# Patient Record
Sex: MALE | Race: WHITE | NOT HISPANIC OR LATINO | Employment: OTHER | URBAN - METROPOLITAN AREA
[De-identification: names, ages, dates, MRNs, and addresses within clinical notes are randomized per-mention and may not be internally consistent; named-entity substitution may affect disease eponyms.]

---

## 2018-01-16 ENCOUNTER — HISTORICAL (OUTPATIENT)
Dept: LAB | Facility: HOSPITAL | Age: 53
End: 2018-01-16

## 2018-01-22 ENCOUNTER — HISTORICAL (OUTPATIENT)
Dept: LAB | Facility: HOSPITAL | Age: 53
End: 2018-01-22

## 2018-03-05 ENCOUNTER — HISTORICAL (OUTPATIENT)
Dept: LAB | Facility: HOSPITAL | Age: 53
End: 2018-03-05

## 2018-05-09 ENCOUNTER — HISTORICAL (OUTPATIENT)
Dept: WOUND CARE | Facility: HOSPITAL | Age: 53
End: 2018-05-09

## 2018-05-16 ENCOUNTER — HISTORICAL (OUTPATIENT)
Dept: WOUND CARE | Facility: HOSPITAL | Age: 53
End: 2018-05-16

## 2018-05-23 ENCOUNTER — HISTORICAL (OUTPATIENT)
Dept: WOUND CARE | Facility: HOSPITAL | Age: 53
End: 2018-05-23

## 2018-05-30 ENCOUNTER — HISTORICAL (OUTPATIENT)
Dept: WOUND CARE | Facility: HOSPITAL | Age: 53
End: 2018-05-30

## 2018-06-06 ENCOUNTER — HISTORICAL (OUTPATIENT)
Dept: WOUND CARE | Facility: HOSPITAL | Age: 53
End: 2018-06-06

## 2018-06-13 ENCOUNTER — HISTORICAL (OUTPATIENT)
Dept: WOUND CARE | Facility: HOSPITAL | Age: 53
End: 2018-06-13

## 2018-06-17 LAB — FINAL CULTURE: NORMAL

## 2018-06-20 ENCOUNTER — HISTORICAL (OUTPATIENT)
Dept: WOUND CARE | Facility: HOSPITAL | Age: 53
End: 2018-06-20

## 2018-06-26 ENCOUNTER — HISTORICAL (OUTPATIENT)
Dept: WOUND CARE | Facility: HOSPITAL | Age: 53
End: 2018-06-26

## 2018-07-11 ENCOUNTER — HISTORICAL (OUTPATIENT)
Dept: WOUND CARE | Facility: HOSPITAL | Age: 53
End: 2018-07-11

## 2018-07-18 ENCOUNTER — HISTORICAL (OUTPATIENT)
Dept: WOUND CARE | Facility: HOSPITAL | Age: 53
End: 2018-07-18

## 2018-07-25 ENCOUNTER — HISTORICAL (OUTPATIENT)
Dept: WOUND CARE | Facility: HOSPITAL | Age: 53
End: 2018-07-25

## 2018-08-08 ENCOUNTER — HISTORICAL (OUTPATIENT)
Dept: WOUND CARE | Facility: HOSPITAL | Age: 53
End: 2018-08-08

## 2018-08-15 ENCOUNTER — HISTORICAL (OUTPATIENT)
Dept: WOUND CARE | Facility: HOSPITAL | Age: 53
End: 2018-08-15

## 2018-08-22 ENCOUNTER — HISTORICAL (OUTPATIENT)
Dept: WOUND CARE | Facility: HOSPITAL | Age: 53
End: 2018-08-22

## 2018-08-29 ENCOUNTER — HISTORICAL (OUTPATIENT)
Dept: WOUND CARE | Facility: HOSPITAL | Age: 53
End: 2018-08-29

## 2018-09-05 ENCOUNTER — HISTORICAL (OUTPATIENT)
Dept: WOUND CARE | Facility: HOSPITAL | Age: 53
End: 2018-09-05

## 2018-09-12 ENCOUNTER — HISTORICAL (OUTPATIENT)
Dept: WOUND CARE | Facility: HOSPITAL | Age: 53
End: 2018-09-12

## 2018-09-19 ENCOUNTER — HISTORICAL (OUTPATIENT)
Dept: WOUND CARE | Facility: HOSPITAL | Age: 53
End: 2018-09-19

## 2018-10-03 ENCOUNTER — HISTORICAL (OUTPATIENT)
Dept: WOUND CARE | Facility: HOSPITAL | Age: 53
End: 2018-10-03

## 2018-10-07 ENCOUNTER — HISTORICAL (OUTPATIENT)
Dept: WOUND CARE | Facility: HOSPITAL | Age: 53
End: 2018-10-07

## 2018-10-17 ENCOUNTER — HISTORICAL (OUTPATIENT)
Dept: WOUND CARE | Facility: HOSPITAL | Age: 53
End: 2018-10-17

## 2018-10-24 ENCOUNTER — HISTORICAL (OUTPATIENT)
Dept: WOUND CARE | Facility: HOSPITAL | Age: 53
End: 2018-10-24

## 2018-10-27 ENCOUNTER — HISTORICAL (OUTPATIENT)
Dept: WOUND CARE | Facility: HOSPITAL | Age: 53
End: 2018-10-27

## 2018-10-31 ENCOUNTER — HISTORICAL (OUTPATIENT)
Dept: WOUND CARE | Facility: HOSPITAL | Age: 53
End: 2018-10-31

## 2018-11-07 ENCOUNTER — HISTORICAL (OUTPATIENT)
Dept: WOUND CARE | Facility: HOSPITAL | Age: 53
End: 2018-11-07

## 2018-11-14 ENCOUNTER — HISTORICAL (OUTPATIENT)
Dept: WOUND CARE | Facility: HOSPITAL | Age: 53
End: 2018-11-14

## 2018-11-28 ENCOUNTER — HISTORICAL (OUTPATIENT)
Dept: WOUND CARE | Facility: HOSPITAL | Age: 53
End: 2018-11-28

## 2018-12-12 ENCOUNTER — HISTORICAL (OUTPATIENT)
Dept: WOUND CARE | Facility: HOSPITAL | Age: 53
End: 2018-12-12

## 2018-12-26 ENCOUNTER — HISTORICAL (OUTPATIENT)
Dept: WOUND CARE | Facility: HOSPITAL | Age: 53
End: 2018-12-26

## 2019-01-09 ENCOUNTER — HISTORICAL (OUTPATIENT)
Dept: WOUND CARE | Facility: HOSPITAL | Age: 54
End: 2019-01-09

## 2019-01-16 ENCOUNTER — HISTORICAL (OUTPATIENT)
Dept: WOUND CARE | Facility: HOSPITAL | Age: 54
End: 2019-01-16

## 2019-01-30 ENCOUNTER — HISTORICAL (OUTPATIENT)
Dept: WOUND CARE | Facility: HOSPITAL | Age: 54
End: 2019-01-30

## 2019-02-12 ENCOUNTER — HISTORICAL (OUTPATIENT)
Dept: WOUND CARE | Facility: HOSPITAL | Age: 54
End: 2019-02-12

## 2019-02-13 ENCOUNTER — HISTORICAL (OUTPATIENT)
Dept: WOUND CARE | Facility: HOSPITAL | Age: 54
End: 2019-02-13

## 2019-03-06 ENCOUNTER — HISTORICAL (OUTPATIENT)
Dept: WOUND CARE | Facility: HOSPITAL | Age: 54
End: 2019-03-06

## 2019-03-14 ENCOUNTER — HISTORICAL (OUTPATIENT)
Dept: WOUND CARE | Facility: HOSPITAL | Age: 54
End: 2019-03-14

## 2019-03-18 ENCOUNTER — HISTORICAL (OUTPATIENT)
Dept: LAB | Facility: HOSPITAL | Age: 54
End: 2019-03-18

## 2019-03-20 ENCOUNTER — HISTORICAL (OUTPATIENT)
Dept: WOUND CARE | Facility: HOSPITAL | Age: 54
End: 2019-03-20

## 2019-04-03 ENCOUNTER — HISTORICAL (OUTPATIENT)
Dept: WOUND CARE | Facility: HOSPITAL | Age: 54
End: 2019-04-03

## 2019-04-17 ENCOUNTER — HISTORICAL (OUTPATIENT)
Dept: WOUND CARE | Facility: HOSPITAL | Age: 54
End: 2019-04-17

## 2019-05-01 ENCOUNTER — HISTORICAL (OUTPATIENT)
Dept: WOUND CARE | Facility: HOSPITAL | Age: 54
End: 2019-05-01

## 2019-05-15 ENCOUNTER — HISTORICAL (OUTPATIENT)
Dept: WOUND CARE | Facility: HOSPITAL | Age: 54
End: 2019-05-15

## 2019-05-22 ENCOUNTER — HISTORICAL (OUTPATIENT)
Dept: WOUND CARE | Facility: HOSPITAL | Age: 54
End: 2019-05-22

## 2019-05-29 ENCOUNTER — HISTORICAL (OUTPATIENT)
Dept: WOUND CARE | Facility: HOSPITAL | Age: 54
End: 2019-05-29

## 2019-06-05 ENCOUNTER — HISTORICAL (OUTPATIENT)
Dept: WOUND CARE | Facility: HOSPITAL | Age: 54
End: 2019-06-05

## 2019-06-12 ENCOUNTER — HISTORICAL (OUTPATIENT)
Dept: WOUND CARE | Facility: HOSPITAL | Age: 54
End: 2019-06-12

## 2019-06-26 ENCOUNTER — HISTORICAL (OUTPATIENT)
Dept: WOUND CARE | Facility: HOSPITAL | Age: 54
End: 2019-06-26

## 2019-07-10 ENCOUNTER — HISTORICAL (OUTPATIENT)
Dept: WOUND CARE | Facility: HOSPITAL | Age: 54
End: 2019-07-10

## 2019-07-17 ENCOUNTER — HISTORICAL (OUTPATIENT)
Dept: WOUND CARE | Facility: HOSPITAL | Age: 54
End: 2019-07-17

## 2019-07-24 ENCOUNTER — HISTORICAL (OUTPATIENT)
Dept: WOUND CARE | Facility: HOSPITAL | Age: 54
End: 2019-07-24

## 2019-07-31 ENCOUNTER — HISTORICAL (OUTPATIENT)
Dept: WOUND CARE | Facility: HOSPITAL | Age: 54
End: 2019-07-31

## 2019-08-07 ENCOUNTER — HISTORICAL (OUTPATIENT)
Dept: WOUND CARE | Facility: HOSPITAL | Age: 54
End: 2019-08-07

## 2019-08-21 ENCOUNTER — HISTORICAL (OUTPATIENT)
Dept: WOUND CARE | Facility: HOSPITAL | Age: 54
End: 2019-08-21

## 2019-08-28 ENCOUNTER — HISTORICAL (OUTPATIENT)
Dept: WOUND CARE | Facility: HOSPITAL | Age: 54
End: 2019-08-28

## 2019-09-11 ENCOUNTER — HISTORICAL (OUTPATIENT)
Dept: WOUND CARE | Facility: HOSPITAL | Age: 54
End: 2019-09-11

## 2020-02-05 ENCOUNTER — HISTORICAL (OUTPATIENT)
Dept: WOUND CARE | Facility: HOSPITAL | Age: 55
End: 2020-02-05

## 2020-05-19 ENCOUNTER — HISTORICAL (OUTPATIENT)
Dept: WOUND CARE | Facility: HOSPITAL | Age: 55
End: 2020-05-19

## 2021-05-04 ENCOUNTER — HISTORICAL (OUTPATIENT)
Dept: WOUND CARE | Facility: HOSPITAL | Age: 56
End: 2021-05-04

## 2021-06-19 ENCOUNTER — HISTORICAL (OUTPATIENT)
Dept: LAB | Facility: HOSPITAL | Age: 56
End: 2021-06-19

## 2021-06-21 LAB — FINAL CULTURE: NO GROWTH

## 2021-10-15 ENCOUNTER — HISTORICAL (OUTPATIENT)
Dept: LAB | Facility: HOSPITAL | Age: 56
End: 2021-10-15

## 2021-10-15 LAB
ERYTHROCYTE [DISTWIDTH] IN BLOOD BY AUTOMATED COUNT: 16.2 % (ref 11.5–17)
HCT VFR BLD AUTO: 39.9 % (ref 42–52)
HGB BLD-MCNC: 12.4 GM/DL (ref 14–18)
MCH RBC QN AUTO: 26.1 PG (ref 27–31)
MCHC RBC AUTO-ENTMCNC: 31.1 GM/DL (ref 33–36)
MCV RBC AUTO: 83.8 FL (ref 80–94)
PLATELET # BLD AUTO: 209 X10(3)/MCL (ref 130–400)
PMV BLD AUTO: 10.4 FL (ref 9.4–12.4)
RBC # BLD AUTO: 4.76 X10(6)/MCL (ref 4.7–6.1)
WBC # SPEC AUTO: 6.9 X10(3)/MCL (ref 4.5–11.5)

## 2021-11-09 ENCOUNTER — HISTORICAL (OUTPATIENT)
Dept: LAB | Facility: HOSPITAL | Age: 56
End: 2021-11-09

## 2021-11-09 LAB
HCT VFR BLD AUTO: 38.8 % (ref 42–52)
HGB BLD-MCNC: 11.5 GM/DL (ref 14–18)

## 2022-04-19 ENCOUNTER — HISTORICAL (OUTPATIENT)
Dept: LAB | Facility: HOSPITAL | Age: 57
End: 2022-04-19
Payer: MEDICARE

## 2022-04-19 LAB
ALBUMIN SERPL-MCNC: 3.2 G/DL (ref 3.5–5)
ALBUMIN/GLOB SERPL: 0.8 {RATIO} (ref 1.1–2)
ALP SERPL-CCNC: 126 U/L (ref 40–150)
ALT SERPL-CCNC: 27 U/L (ref 0–55)
AST SERPL-CCNC: 17 U/L (ref 5–34)
BILIRUB SERPL-MCNC: 0.5 MG/DL
BILIRUBIN DIRECT+TOT PNL SERPL-MCNC: 0.2 (ref 0–0.5)
BILIRUBIN DIRECT+TOT PNL SERPL-MCNC: 0.3 (ref 0–0.8)
BUN SERPL-MCNC: 13 MG/DL (ref 8.4–25.7)
CALCIUM SERPL-MCNC: 9 MG/DL (ref 8.7–10.5)
CHLORIDE SERPL-SCNC: 100 MMOL/L (ref 98–107)
CHOLEST SERPL-MCNC: 211 MG/DL
CHOLEST/HDLC SERPL: 7 {RATIO} (ref 0–5)
CO2 SERPL-SCNC: 25 MMOL/L (ref 22–29)
CREAT SERPL-MCNC: 0.72 MG/DL (ref 0.73–1.18)
GLOBULIN SER-MCNC: 3.9 G/DL (ref 2.4–3.5)
GLUCOSE SERPL-MCNC: 211 MG/DL (ref 74–100)
HDLC SERPL-MCNC: 31 MG/DL (ref 35–60)
HEMOLYSIS INTERF INDEX SERPL-ACNC: 4
ICTERIC INTERF INDEX SERPL-ACNC: 0
LDLC SERPL CALC-MCNC: 139 MG/DL (ref 50–140)
LIPEMIC INTERF INDEX SERPL-ACNC: 1
POTASSIUM SERPL-SCNC: 4.4 MMOL/L (ref 3.5–5.1)
PROT SERPL-MCNC: 7.1 G/DL (ref 6.4–8.3)
SODIUM SERPL-SCNC: 136 MMOL/L (ref 136–145)
TRIGL SERPL-MCNC: 203 MG/DL (ref 34–140)
VLDLC SERPL CALC-MCNC: 41 MG/DL

## 2022-05-04 NOTE — HISTORICAL [facility] CERNER
This is a historical note converted from Becca. Formatting and pictures may have been removed.  Please reference Becca for original formatting and attached multimedia. History of Present Illness  Incision/Wounds  ?1. Heel Right Pressure ulcer?- last charted: 11/30/2021 10:45  ?? ??Assessment Done By?- Wound Care Team  ?? ??Pressure Point?- Bony prominence  ?? ??Dressing Type?- ABD dressing pad, Gauze dressing, Honey, Medicated packing strips, Rolled Gauze, Tape, Tubular bandage  ?? ??Dressing Assessment?- Drainage present, Intact  ?? ??Dressing Activity?- Changed  ?? ??Cleansing?- Cleaned with normal saline  ?? ??Length?- 5.0 cm  ?? ??Width?- 3.3 cm  ?? ??Depth?- 0.1 cm  ?? ??Wound Bed Tissue Type?- Ecchymotic, Fibrotic, Granulating  ?? ??Pressure Ulcer Stage?- III  ?? ??Exudate Amount?- Moderate  ?? ??Exudate Type?- Serosanguineous  ?? ??Exudate Odor?- None  ?? ??Edge?- Well defined  ?? ??Surrounding Tissue Color?- Normal  ?? ??Surrounding Tissue Condition?- Callus, Intact  ?? ??Status?- Improving  ?  ?2. Foot Right Lateral Pressure ulcer?- last charted: 11/30/2021 10:45  ?? ??Assessment Done By?- Wound Care Team  ?? ??Abnormality Pattern?- Flat  ?? ??Abnormality Color?- Black  ?? ??Pressure Point?- Bony prominence  ?? ??Dressing Type?- ABD dressing pad, Gauze dressing, Honey, Medicated packing strips, Rolled Gauze, Tape  ?? ??Dressing Assessment?- Intact  ?? ??Dressing Activity?- Changed  ?? ??Cleansing?- Commercial cleansing solution  ?? ??Length?- 1.4 cm  ?? ??Width?- 0.5 cm  ?? ??Depth?- 0 cm  ?? ??Wound Bed Tissue Type?- Dry, Scab  ?? ??Pressure Ulcer Stage?- Suspected deep tissue injury  ?? ??Exudate Amount?- None  ?? ??Exudate Odor?- None  ?? ??Edge?- Well defined  ?? ??Surrounding Tissue Color?- Normal  ?? ??Surrounding Tissue Condition?- Intact  ?? ??Status?- Improving  ?  ?3. Ankle Right Anterior Pressure ulcer?- last charted: 11/30/2021 10:54  ?? ??Assessment Done By?- Wound Care Team  ?? ??Abnormality  Pattern?- Palpable  ?? ??Abnormality Color?- Black, Red  ?? ??Dressing Type?- Band-Aid, Honey, Medicated packing strips, Rolled Gauze, Tape  ?? ??Dressing Assessment?- Intact  ?? ??Dressing Activity?- Changed  ?? ??Cleansing?- Cleaned with normal saline  ?? ??Length?- 0.5 cm  ?? ??Width?- 0.6 cm  ?? ??Wound Bed Tissue Type?- Necrotic tissue, eschar  ?? ??Pressure Ulcer Stage?- Unstageable  ?? ??Exudate Amount?- None  ?? ??Exudate Odor?- None  ?? ??Edge?- Well defined  ?? ??Surrounding Tissue Color?- Normal  ?? ??Surrounding Tissue Condition?- Edematous  Right foot lateral?ulcer and right ankle lateral ulcer?both dry and scabbed.?Both areas were?cleaned?and?Betadine used to keep area dry.?Right heel ulcer?with?ecchymotic fibrotic tissue.?This is somewhat stringy.?This is also granulating. There is some bleeding that has been occurring.?Because of the?fibrotic tissue?a?selective debridement less than 20 cm? was done.?Curette was used and?large amount?of?fibrotic tissue removed and a tissue culture was done of this.?The area?had AlaSTAT applied for hemostasis.?Patient is currently on?Eliquis.?He claims that there has been some increased bleeding in his heel lately.?Uma was used to?stop the bleeding and?area was?wrapped with gauze ABD pads and Kerlix.?Patient will change?dressings daily clean with normal saline?and apply?Medihoney Mesalt?and gauze. Also ABD and Kerlix.?Patient return in 1 week.?Patient is also to use Tubigrip E for?compression.  Physical Exam  Vitals & Measurements  T:?36.6? ?C (Oral)? HR:?92(Peripheral)? RR:?18? BP:?151/85? SpO2:?98%?  ?  Assessment/Plan  1.?Pressure ulcer of right heel, stage 3?L89.613  Ordered:  Tissue Culture - Aerobic, Routine collect, 11/30/21 11:22:00 CST, Order for future visit, Tissue, Heel, Nurse collect, Stop date 11/30/21 11:22:00 CST, Pressure ulcer of right heel, stage 3  Lymphedema of leg  Quadriplegia  Toe ulcer  Pressure ulcer with abrasion, blister,...  Wound  Care Outpatient, *Est. 12/07/21 3:00:00 CST, *Est. Stop date 12/07/21 3:00:00 CST, [facility], FU with Physician in 1 week  Wound Care Team Treatment, 11/30/21 11:21:00 CST, Stop date 11/30/21 11:21:00 CST, Daily dressing changes with normal saline cleansing. Apply Mesalt Medihoney gauze and ABD pad. Wrapped with Kerlix. Use Tubigrip E. Patient is to return in 1 week.  ?  2.?Lymphedema of leg?I89.0  Ordered:  Tissue Culture - Aerobic, Routine collect, 11/30/21 11:22:00 CST, Order for future visit, Tissue, Heel, Nurse collect, Stop date 11/30/21 11:22:00 CST, Pressure ulcer of right heel, stage 3  Lymphedema of leg  Quadriplegia  Toe ulcer  Pressure ulcer with abrasion, blister,...  Wound Care Outpatient, *Est. 12/07/21 3:00:00 CST, *Est. Stop date 12/07/21 3:00:00 CST, [facility], FU with Physician in 1 week  Wound Care Team Treatment, 11/30/21 11:21:00 CST, Stop date 11/30/21 11:21:00 CST, Daily dressing changes with normal saline cleansing. Apply Mesalt Medihoney gauze and ABD pad. Wrapped with Kerlix. Use Tubigrip E. Patient is to return in 1 week.  ?  3.?Quadriplegia?G82.50  Ordered:  Tissue Culture - Aerobic, Routine collect, 11/30/21 11:22:00 CST, Order for future visit, Tissue, Heel, Nurse collect, Stop date 11/30/21 11:22:00 CST, Pressure ulcer of right heel, stage 3  Lymphedema of leg  Quadriplegia  Toe ulcer  Pressure ulcer with abrasion, blister,...  Wound Care Outpatient, *Est. 12/07/21 3:00:00 CST, *Est. Stop date 12/07/21 3:00:00 CST, [facility], FU with Physician in 1 week  Wound Care Team Treatment, 11/30/21 11:21:00 CST, Stop date 11/30/21 11:21:00 CST, Daily dressing changes with normal saline cleansing. Apply Mesalt Medihoney gauze and ABD pad. Wrapped with Kerlix. Use Tubigrip E. Patient is to return in 1 week.  ?  4.?Toe ulcer?L97.509  Ordered:  Tissue Culture - Aerobic, Routine collect, 11/30/21 11:22:00 CST, Order for future visit, Tissue, Heel, Nurse  collect, Stop date 11/30/21 11:22:00 CST, Pressure ulcer of right heel, stage 3  Lymphedema of leg  Quadriplegia  Toe ulcer  Pressure ulcer with abrasion, blister,...  Wound Care Outpatient, *Est. 12/07/21 3:00:00 CST, *Est. Stop date 12/07/21 3:00:00 CST, [facility], FU with Physician in 1 week  Wound Care Team Treatment, 11/30/21 11:21:00 CST, Stop date 11/30/21 11:21:00 CST, Daily dressing changes with normal saline cleansing. Apply Mesalt Medihoney gauze and ABD pad. Wrapped with Kerlix. Use Tubigrip E. Patient is to return in 1 week.  ?  5.?Pressure ulcer with abrasion, blister, partial thickness skin loss involving epidermis and/or dermis?L89.92  Ordered:  Tissue Culture - Aerobic, Routine collect, 11/30/21 11:22:00 CST, Order for future visit, Tissue, Heel, Nurse collect, Stop date 11/30/21 11:22:00 CST, Pressure ulcer of right heel, stage 3  Lymphedema of leg  Quadriplegia  Toe ulcer  Pressure ulcer with abrasion, blister,...  Wound Care Outpatient, *Est. 12/07/21 3:00:00 CST, *Est. Stop date 12/07/21 3:00:00 CST, [facility], FU with Physician in 1 week  Wound Care Team Treatment, 11/30/21 11:21:00 CST, Stop date 11/30/21 11:21:00 CST, Daily dressing changes with normal saline cleansing. Apply Mesalt Medihoney gauze and ABD pad. Wrapped with Kerlix. Use Tubigrip E. Patient is to return in 1 week.  ?   Problem List/Past Medical History  Ongoing  Chronic skin ulcer  DM (diabetes mellitus)  Infected decubitus ulcer  Lymphedema of leg  Neurogenic bladder  Obesity  Open wound of abdomen  Pressure ulcer of heel  Pressure ulcer of right ischium  Quadriplegia  Quadriplegic spinal paralysis  Skin eschar  Historical  Pressure ulcer of right foot stage 3  Medications  Bactroban 2% topical ointment, 1 aruna, TOP, TID,? ?Not Taking, Completed Rx  BASAGLAR INJ 100UNIT  Bydureon BCise 2 mg/0.85 mL subcutaneous suspension, extended release,? ?Not Taking per Prescriber  Ciprofloxacin Hcl 500 Mg Tab,  500 mg= 1 tab(s), BID,? ?Not Taking, Completed Rx  clindamycin 150 mg oral capsule, 300 mg= 2 cap(s), Oral, q6hr,? ?Not Taking, Completed Rx  Eliquis Starter Pack for Treatment of DVT and PE 5 mg oral tablet, 5 mg= 1 tab(s), Oral, BID  gentamicin 0.1% topical ointment, 1 aruna, TOP, Daily,? ?Not Taking, Completed Rx  gentamicin 0.1% topical ointment, 1 aruna, TOP, Daily, 2 refills  GLIPIZIDE TAB 10MG, 10 mg= 1 tab(s), Oral, BID,? ?Not Taking per Prescriber  JANUVIA TAB 100MG, 100 mg= 1 tab(s), Oral, Daily,? ?Not Taking per Prescriber  Januvia 50 mg oral tablet, 50 mg= 1 tab(s), Oral, Daily  Ketoconazole 2% Shampoo,? ?Not Taking, Completed Rx  Levofloxacin 500 Mg Tablet, 500 mg= 1 tab(s), Oral, Daily,? ?Not Taking, Completed Rx  metoprolol succinate 50 mg oral tablet, extended release, 50 mg= 1 tab(s), Oral, BID  MUPIROCIN OIN 2%,? ?Not taking  MYRBETRIQ TAB 50MG, 50 mg= 1 tab(s), Oral, Daily  OXYBUTYNIN TAB 5MG,? ?Not Taking per Prescriber  ReliOn/NovoLIN R 100 units/mL injectable solution  sulfamethoxazole-trimethoprim 800 mg-160 mg oral tablet, 1 tab(s), Oral, BID,? ?Not Taking, Completed Rx  Tramadol Hcl Tab 50 Mg, 50 mg= 1 tab(s), Oral, q4-6hr,? ?Not Taking, Completed Rx  Allergies  No Known Allergies  Social History  Abuse/Neglect  No, 05/19/2020  Tobacco  Never (less than 100 in lifetime), N/A, 05/19/2020  Never smoker, 05/01/2017  Health Maintenance  Health Maintenance  ???Pending?(in the next year)  ??? ??OverDue  ??? ? ? ?Obesity Screening due??01/01/21??and every 1??year(s)  ??? ? ? ?Alcohol Misuse Screening due??01/02/21??and every 1??year(s)  ??? ? ? ?ADL Screening due??05/19/21??and every 1??year(s)  ??? ??Due?  ??? ? ? ?Aspirin Therapy for CVD Prevention due??11/30/21??and every 1??year(s)  ??? ? ? ?Medicare Annual Wellness Exam due??11/30/21??and every 1??year(s)  ??? ? ? ?Tetanus Vaccine due??11/30/21??and every 10??year(s)  ???Satisfied?(in the past 1 year)  ??? ??Satisfied?  ??? ? ? ?Blood Pressure  Screening on??11/30/21.??Satisfied by Sally MADDOX, Ca Sánchez  ?

## 2022-05-04 NOTE — HISTORICAL [facility] CERNER
This is a historical note converted from Becca. Formatting and pictures may have been removed.  Please reference Becca for original formatting and attached multimedia. History of Present Illness  The patient returns for continued management of?infected?left heel ulcer. ?He was recently?advised to use?Rocephin?orally and?topical?tobramycin. ?He culture positive for?Morganella?and Enterococcus species.? He also is using?Tubigrip for?management of edema.? He offers no new complaints and is pleased with his progress.  Review of Systems  Not significantly different than the history of present and past medical illnesses.  Physical Exam  Vitals & Measurements  T:?36.6? ?C (Oral)? HR:?126(Peripheral)? RR:?18? BP:?119/89? SpO2:?99%?  ?  On examination the patient has?2+ pedal edema.? He also has?an ulcer?broadly covering the?posterior heel.? There is 50% granulation tissue,?35% slough and 15%?necrosis.? A scant serosanguineous drainage is present.? There is also skin desquamation along the wound periphery.? There is no evidence of obvious infection.  ?  Incision/Wounds  ?1. Heel Right Pressure ulcer?- last charted: 06/22/2021 12:59  ?? ??Assessment Done By?- Wound Care Team  ?? ??Pressure Point?- Bony prominence  ?? ??Dressing Type?- ABD dressing pad, Gauze dressing, Medicated packing strips, Rolled Gauze, Tape  ?? ??Dressing Assessment?- Drainage present, Intact  ?? ??Dressing Activity?- Changed  ?? ??Cleansing?- Cleaned with normal saline  ?? ??Length?- 4.5 cm  ?? ??Width?- 8 cm  ?? ??Depth?- 0.1 cm  ?? ??Wound Bed Tissue Type?- Granulating, Necrotic tissue, slough, Pale Pink, Scab  ?? ??Pressure Ulcer Stage?- III  ?? ??Exudate Amount?- Small  ?? ??Exudate Type?- Serosanguineous  ?? ??Exudate Odor?- None  ?? ??Edge?- Well defined  ?? ??Surrounding Tissue Color?- Pale  ?? ??Surrounding Tissue Condition?- Dry, Intact, Maceration  ?? ??Topical Agent Application?- Skin Prep  ?  Assessment/Plan  1.?Infected decubitus  ulcer?L89.90  ?Improving with current therapy. ?He has been advised to complete Omnicef as previously prescribed to completion.  Ordered:  Wound Care Outpatient, *Est. 06/29/21 3:00:00 CDT, *Est. Stop date 06/29/21 3:00:00 CDT, [facility], FU with Physician in 1 week  Wound Care Team Treatment, 06/22/21 13:32:00 CDT, Stop date 06/22/21 13:32:00 CDT, Apply Mesalt and medical honey to the wound and change daily. Continue offloading maneuvers.  ?  2.?Pressure ulcer of right heel, stage 3?L89.613  ?Continue offloading maneuvers.  Ordered:  Wound Care Outpatient, *Est. 06/29/21 3:00:00 CDT, *Est. Stop date 06/29/21 3:00:00 CDT, [facility], FU with Physician in 1 week  Wound Care Team Treatment, 06/22/21 13:32:00 CDT, Stop date 06/22/21 13:32:00 CDT, Apply Mesalt and medical honey to the wound and change daily. Continue offloading maneuvers.  ?  3.?Quadriplegia?G82.50  ?Continue?offloading maneuvers.  Ordered:  Wound Care Outpatient, *Est. 06/29/21 3:00:00 CDT, *Est. Stop date 06/29/21 3:00:00 CDT, [facility], FU with Physician in 1 week  Wound Care Team Treatment, 06/22/21 13:32:00 CDT, Stop date 06/22/21 13:32:00 CDT, Apply Mesalt and medical honey to the wound and change daily. Continue offloading maneuvers.  ?   Problem List/Past Medical History  Ongoing  Chronic skin ulcer  DM (diabetes mellitus)  Infected decubitus ulcer  Neurogenic bladder  Obesity  Open wound of abdomen  Pressure ulcer of heel  Pressure ulcer of right ischium  Quadriplegia  Quadriplegic spinal paralysis  Skin eschar  Historical  Pressure ulcer of right foot stage 3  Medications  Bactroban 2% topical ointment, 1 aruna, TOP, TID,? ?Not Taking, Completed Rx  BASAGLAR INJ 100UNIT  Bydureon BCise 2 mg/0.85 mL subcutaneous suspension, extended release,? ?Not Taking per Prescriber  Ciprofloxacin Hcl 500 Mg Tab, 500 mg= 1 tab(s), BID,? ?Not Taking, Completed Rx  clindamycin 150 mg oral capsule, 300 mg= 2 cap(s), Oral, q6hr,? ?Not  Taking, Completed Rx  gentamicin 0.1% topical ointment, 1 aruna, TOP, Daily,? ?Not Taking, Completed Rx  gentamicin 0.1% topical ointment, 1 aruna, TOP, Daily, 2 refills  GLIPIZIDE TAB 10MG, 10 mg= 1 tab(s), Oral, BID,? ?Not Taking per Prescriber  JANUVIA TAB 100MG, 100 mg= 1 tab(s), Oral, Daily,? ?Not Taking per Prescriber  Januvia 50 mg oral tablet, 50 mg= 1 tab(s), Oral, Daily  Ketoconazole 2% Shampoo,? ?Not Taking, Completed Rx  Levofloxacin 500 Mg Tablet, 500 mg= 1 tab(s), Oral, Daily,? ?Not Taking, Completed Rx  MUPIROCIN OIN 2%,? ?Not taking  MYRBETRIQ TAB 50MG, 50 mg= 1 tab(s), Oral, Daily  Omnicef 300 mg oral capsule, 300 mg= 1 cap(s), Oral, q12hr  OXYBUTYNIN TAB 5MG,? ?Not Taking per Prescriber  ReliOn/NovoLIN R 100 units/mL injectable solution  sulfamethoxazole-trimethoprim 800 mg-160 mg oral tablet, 1 tab(s), Oral, BID,? ?Not Taking, Completed Rx  Tramadol Hcl Tab 50 Mg, 50 mg= 1 tab(s), Oral, q4-6hr,? ?Not Taking, Completed Rx  Allergies  No Known Allergies  Social History  Abuse/Neglect  No, 05/19/2020  Tobacco  Never (less than 100 in lifetime), N/A, 05/19/2020  Never smoker, 05/01/2017  Health Maintenance  Health Maintenance  ???Pending?(in the next year)  ??? ??OverDue  ??? ? ? ?Obesity Screening due??01/01/21??and every 1??year(s)  ??? ? ? ?Alcohol Misuse Screening due??01/02/21??and every 1??year(s)  ??? ? ? ?ADL Screening due??05/19/21??and every 1??year(s)  ??? ??Due?  ??? ? ? ?Aspirin Therapy for CVD Prevention due??06/22/21??and every 1??year(s)  ??? ? ? ?Medicare Annual Wellness Exam due??06/22/21??and every 1??year(s)  ??? ? ? ?Tetanus Vaccine due??06/22/21??and every 10??year(s)  ???Satisfied?(in the past 1 year)  ??? ??Satisfied?  ??? ? ? ?Blood Pressure Screening on??06/22/21.??Satisfied by VERONICA Tim Shawndala  ?

## 2022-05-04 NOTE — HISTORICAL [facility] CERNER
This is a historical note converted from Becca. Formatting and pictures may have been removed.  Please reference Becca for original formatting and attached multimedia. Chief Complaint  abdominal wall wound and heel ulcer  History of Present Illness  see nurses notes  Review of Systems  see nurses notes  Physical Exam  Vitals & Measurements  T:?36.9? ?C (Oral)? HR:?80(Peripheral)? RR:?16? BP:?136/88? SpO2:?95%?  ?  Assessment/Plan  1.?Open wound of abdomen?S31.109A  ? Incision/Wounds  ?1. Abdomen Lower, Other: surgical incision?- last charted: 06/05/2019 11:05  ?? ??Assessment Done By?- Wound Care Team  ?? ??Dressing Type?- Gauze dressing, Medicated packing strips, Tape  ?? ??Dressing Assessment?- Drainage present, Intact  ?? ??Dressing Activity?- Changed  ?? ??Cleansing?- Cleaned with normal saline  ?? ??Length?- 0.3 cm  ?? ??Width?- 0.2 cm  ?? ??Depth?- 3.0 cm  ?? ??Wound Bed Tissue Type?- Granulating  ?? ??Percent Granulated?- 100 %  ?? ??Exudate Amount?- Moderate  ?? ??Exudate Type?- Serosanguineous  ?? ??Exudate Odor?- None  ?? ??Edge?- Well defined  ?? ??Surrounding Tissue Color?- Normal  ?? ??Surrounding Tissue Condition?- Intact  ?  ?2. Foot Right Plantar Blister?- last charted: 06/05/2019 11:05  ?? ??Assessment Done By?- Wound Care Team  ?? ??Dressing Type?- Foam  ?? ??Dressing Assessment?- Drainage present, Intact  ?? ??Dressing Activity?- Applied  ?? ??Cleansing?- Cleaned with normal saline  ?? ??Length?- 0.1 cm  ?? ??Width?- 0.3 cm  ?? ??Depth?- 0.1 cm  ?? ??Wound Bed Tissue Type?- Granulating  ?? ??Percent Granulated?- 100 %  ?? ??Exudate Amount?- Scant  ?? ??Exudate Type?- Serosanguineous  ?? ??Exudate Odor?- None  ?? ??Edge?- Attached to wound bed  ?? ??Surrounding Tissue Color?- Normal  ?? ??Surrounding Tissue Condition?- Intact  ?? ??Status?- Improving  ?  ?wounds improved will continue poc and fu in one week  2.?Stage I pressure ulcer of right heel?L89.611  Orders:  Wound Care Outpatient, *Est.  06/12/19 3:00:00 CDT, *Est. Stop date 06/12/19 3:00:00 CDT, [facility], FU with Physician in 1 week  Wound Care Team Treatment, 06/05/19 11:12:00 CDT, Stop date 06/05/19 11:12:00 CDT, continue collagen dressing with cover on the heel and continue mesalt packing of the abdominal wound   Problem List/Past Medical History  Ongoing  DM (diabetes mellitus)  Open wound of abdomen  Quadriplegia  Quadriplegic spinal paralysis  Historical  No qualifying data  Medications  Bactroban 2% topical ointment, 1 aruna, TOP, TID,? ?Not Taking, Completed Rx  BASAGLAR INJ 100UNIT  Ciprofloxacin Hcl 500 Mg Tab, 500 mg= 1 tab(s), BID,? ?Not Taking, Completed Rx  clindamycin 150 mg oral capsule, 300 mg= 2 cap(s), Oral, q6hr,? ?Not Taking, Completed Rx  GLIPIZIDE TAB 10MG, 10 mg= 1 tab(s), Oral, BID  JANUVIA TAB 100MG, 100 mg= 1 tab(s), Oral, Daily  Ketoconazole 2% Shampoo,? ?Not Taking, Completed Rx  Levofloxacin 500 Mg Tablet, 500 mg= 1 tab(s), Oral, Daily,? ?Not Taking, Completed Rx  MUPIROCIN OIN 2%,? ?Not taking  MYRBETRIQ TAB 50MG, 50 mg= 1 tab(s), Oral, Daily  OXYBUTYNIN TAB 5MG,? ?Not Taking per Prescriber  ReliOn/NovoLIN R 100 units/mL injectable solution  Tramadol Hcl Tab 50 Mg, 50 mg= 1 tab(s), Oral, q4-6hr  Allergies  No Known Allergies  Social History  Tobacco  Never smoker, 05/11/2017  Health Maintenance  Health Maintenance  ???Pending?(in the next year)  ??? ??OverDue  ??? ? ? ?Alcohol Misuse Screening due??01/01/19??and every 1??year(s)  ??? ? ? ?Obesity Screening due??01/01/19??and every 1??year(s)  ??? ? ? ?ADL Screening due??05/30/19??and every 1??year(s)  ??? ??Due?  ??? ? ? ?Aspirin Therapy for CVD Prevention due??06/05/19??and every 1??year(s)  ??? ? ? ?Tetanus Vaccine due??06/05/19??and every 10??year(s)  ???Satisfied?(in the past 1 year)  ??? ??Satisfied?  ??? ? ? ?Blood Pressure Screening on??06/05/19.??Satisfied by Jolynn Madrid LPN  ?  ?

## 2022-05-04 NOTE — HISTORICAL [facility]ER
This is a historical note converted from Becca. Formatting and pictures may have been removed.  Please reference Becca for original formatting and attached multimedia. Chief Complaint  C/O recurring pressure ulcer to heel  Rt. buttock, ?ischial ulcer?  History of Present Illness  His wound has been dressed with medical honey and Mesalt.  Physical Exam  Vitals & Measurements  T:?36.9? ?C (Oral)? HR:?133(Peripheral)? RR:?18? BP:?103/74? SpO2:?98%?  HT:?172.7?cm? WT:?102?kg?  There is been additional?healing of his right gluteal wound.? He continues to have a granulating wound?centrally?in the midst of a broad area of?superficial denudation. ?There is no evidence of obvious infection.  ?  Incision/Wounds  ?1. Right Other: ischial pressure ulcer?- last charted: 11/03/2020 13:10  ?? ??Assessment Done By?- Wound Care Team  ?? ??Pressure Point?- Bony prominence  ?? ??Dressing Type?- ABD dressing pad, Gauze dressing, Honey, Medicated packing strips, Tape  ?? ??Dressing Assessment?- Dry, Intact  ?? ??Dressing Activity?- Changed  ?? ??Cleansing?- Cleaned with normal saline  ?? ??Length?- 0 cm  ?? ??Width?- 0 cm  ?? ??Depth?- 0 cm  ?? ??Wound Bed Tissue Type?- Epithelialized  ?? ??Percent Epithelialized?- 100 %  ?? ??Pressure Ulcer Stage?- III  ?? ??Exudate Amount?- None  ?? ??Exudate Odor?- None  ?? ??Edge?- Well defined  ?? ??Surrounding Tissue Color?- Erythema  ?? ??Surrounding Tissue Condition?- Friable, Intact  ?? ??Status?- Healed  ?  ?2. Buttock Right Inner?- last charted: 11/03/2020 13:10  ?? ??Assessment Done By?- Wound Care Team  ?? ??Pressure Point?- Bony prominence  ?? ??Dressing Type?- ABD dressing pad, Gauze dressing, Honey, Medicated packing strips, Tape  ?? ??Dressing Assessment?- Drainage present, Intact  ?? ??Dressing Activity?- Changed  ?? ??Cleansing?- Cleaned with normal saline  ?? ??Length?- 5.5 cm  ?? ??Width?- 3 cm  ?? ??Depth?- 0.2 cm  ?? ??Wound Bed Tissue Type?- Epithelialized, Friable,  Granulating, Moist, Necrotic tissue, slough  ?? ??Percent Granulated?- 50 %  ?? ??Percent Other Tissue?- 50 %  ?? ??Pressure Ulcer Stage?- IV  ?? ??Exudate Amount?- Moderate  ?? ??Exudate Type?- Serosanguineous  ?? ??Exudate Odor?- None  ?? ??Edge?- Poorly defined  ?? ??Surrounding Tissue Color?- Erythema  ?? ??Surrounding Tissue Condition?- Excoriated, Friable  ?  Assessment/Plan  1.?Pressure ulcer of right ischium?L89.319  ?Generally?improving?although slowly.? Will discontinue using?Mesalt and medical honey and initiate therapy with a collagen matrix dressing.  Ordered:  Wound Care Outpatient, *Est. 11/24/20 3:00:00 CST, *Est. Stop date 11/24/20 3:00:00 CST, [facility], Return to Clinic 3 weeks  Wound Care Team Treatment, 11/03/20 13:28:00 CST, Stop date 11/03/20 13:28:00 CST, Stop using medical honey. Apply Lor Ag to granulating wound and change daily to every other day depending on the amount of drainage or contamination of wound site. Continue offloading maneuv...  ?  2.?DM (diabetes mellitus)?E11.9  Ordered:  Wound Care Outpatient, *Est. 11/24/20 3:00:00 CST, *Est. Stop date 11/24/20 3:00:00 CST, [facility], Return to Clinic 3 weeks  Wound Care Team Treatment, 11/03/20 13:28:00 CST, Stop date 11/03/20 13:28:00 CST, Stop using medical honey. Apply Lor Ag to granulating wound and change daily to every other day depending on the amount of drainage or contamination of wound site. Continue offloading maneuv...  ?   Problem List/Past Medical History  Ongoing  Chronic skin ulcer  DM (diabetes mellitus)  Neurogenic bladder  Obesity  Open wound of abdomen  Pressure ulcer of heel  Pressure ulcer of right ischium  Quadriplegia  Quadriplegic spinal paralysis  Skin eschar  Historical  Pressure ulcer of right foot stage 3  Medications  Bactroban 2% topical ointment, 1 aruna, TOP, TID,? ?Not Taking, Completed Rx  BASAGLAR INJ 100UNIT  Bydureon BCise 2 mg/0.85 mL subcutaneous suspension, extended  release,? ?Not Taking per Prescriber  Ciprofloxacin Hcl 500 Mg Tab, 500 mg= 1 tab(s), BID,? ?Not Taking, Completed Rx  clindamycin 150 mg oral capsule, 300 mg= 2 cap(s), Oral, q6hr,? ?Not Taking, Completed Rx  gentamicin 0.1% topical ointment, 1 aruna, TOP, Daily,? ?Not Taking, Completed Rx  gentamicin 0.1% topical ointment, 1 aruna, TOP, Daily, 2 refills  GLIPIZIDE TAB 10MG, 10 mg= 1 tab(s), Oral, BID,? ?Not Taking per Prescriber  JANUVIA TAB 100MG, 100 mg= 1 tab(s), Oral, Daily,? ?Not Taking per Prescriber  Januvia 50 mg oral tablet, 50 mg= 1 tab(s), Oral, Daily  Ketoconazole 2% Shampoo,? ?Not Taking, Completed Rx  Levofloxacin 500 Mg Tablet, 500 mg= 1 tab(s), Oral, Daily,? ?Not Taking, Completed Rx  MUPIROCIN OIN 2%,? ?Not taking  MYRBETRIQ TAB 50MG, 50 mg= 1 tab(s), Oral, Daily  OXYBUTYNIN TAB 5MG,? ?Not Taking per Prescriber  ReliOn/NovoLIN R 100 units/mL injectable solution  sulfamethoxazole-trimethoprim 800 mg-160 mg oral tablet, 1 tab(s), Oral, BID,? ?Not Taking, Completed Rx  Tramadol Hcl Tab 50 Mg, 50 mg= 1 tab(s), Oral, q4-6hr,? ?Not Taking, Completed Rx  Allergies  No Known Allergies  Social History  Abuse/Neglect  No, 05/19/2020  Tobacco  Never (less than 100 in lifetime), N/A, 05/19/2020  Never smoker, 05/01/2017  Health Maintenance  Health Maintenance  ???Pending?(in the next year)  ??? ??OverDue  ??? ? ? ?Obesity Screening due??01/01/20??and every 1??year(s)  ??? ? ? ?Alcohol Misuse Screening due??01/02/20??and every 1??year(s)  ??? ??Due?  ??? ? ? ?Aspirin Therapy for CVD Prevention due??11/03/20??and every 1??year(s)  ??? ? ? ?Medicare Annual Wellness Exam due??11/03/20??and every 1??year(s)  ??? ? ? ?Tetanus Vaccine due??11/03/20??and every 10??year(s)  ??? ??Due In Future?  ??? ? ? ?ADL Screening not due until??05/19/21??and every 1??year(s)  ???Satisfied?(in the past 1 year)  ??? ??Satisfied?  ??? ? ? ?ADL Screening on??05/19/20.??Satisfied by Jolynn Madrid LPN  ??? ? ? ?Blood Pressure  Screening on??11/03/20.??Satisfied by VERONICA Tim Shawndala  ?

## 2022-05-04 NOTE — HISTORICAL [facility] CERN[facility]
This is a historical note converted from Becca. Formatting and pictures may have been removed.  Please reference Becca for original formatting and attached multimedia. Chief Complaint  C/O recurring pressure ulcer to heel  Rt. buttock, ?ischial ulcer?  History of Present Illness  Patient returns for continued management of pressure ulcers involving the?and follow-up of his?left?regular?abscess.? He offers no new complaints today.? Apparently he has had some difficulty?obtaining?foam dressings as recommended  Review of Systems  Not significant different than history of present and past medical illnesses.  Physical Exam  Vitals & Measurements  T:?37.0? ?C (Oral)? HR:?133(Peripheral)? RR:?18? BP:?108/67? SpO2:?97%?  HT:?172.7?cm? WT:?102?kg?  The patient has granulating wounds over the?right gluteal area?without evidence of infection. ?There is a small amount of?sanguinous drainage. ?There is no evidence of obvious infection?and the eschar has resolved.  ?  Incision/Wounds  ?1. Right Other: ischial pressure ulcer?- last charted: 09/01/2020 11:17  ?? ??Assessment Done By?- Wound Care Team  ?? ??Pressure Point?- Bony prominence  ?? ??Dressing Type?- Gauze dressing, Honey, Medicated packing strips, Tape  ?? ??Dressing Assessment?- Drainage present, Intact  ?? ??Dressing Activity?- Changed  ?? ??Cleansing?- Cleaned with normal saline  ?? ??Length?- 1.0 cm  ?? ??Width?- 0.8 cm  ?? ??Depth?- 0.4 cm  ?? ??Wound Bed Tissue Type?- Friable, Granulating  ?? ??Percent Granulated?- 100 %  ?? ??Pressure Ulcer Stage?- III  ?? ??Undermining Location?- 12-2 oclock  ?? ??Undermining Depth?- 1.4  ?? ??Exudate Amount?- Small  ?? ??Exudate Type?- Serosanguineous  ?? ??Exudate Odor?- None  ?? ??Edge?- Well defined  ?? ??Surrounding Tissue Color?- Erythema  ?? ??Surrounding Tissue Condition?- Friable, Intact  ?? ??Status?- Improving  ?? ??Topical Agent Application?- Gel  ?  ?2. Buttock Right Inner?- last charted: 09/01/2020 11:17  ??  ??Assessment Done By?- Wound Care Team  ?? ??Abnormality Pattern?- Clustered  ?? ??Pressure Point?- None  ?? ??Dressing Type?- Absorptive, Honey, Medicated packing strips, Tape  ?? ??Dressing Assessment?- Drainage present, Intact  ?? ??Dressing Activity?- Removed  ?? ??Cleansing?- Cleaned with normal saline  ?? ??Length?- 2.5 cm  ?? ??Width?- 1.5 cm  ?? ??Depth?- 0.1 cm  ?? ??Wound Bed Tissue Type?- Epithelialized, Erythema, Friable, Granulating  ?? ??Pressure Ulcer Stage?- Unstageable  ?? ??Exudate Amount?- Moderate  ?? ??Exudate Type?- Serosanguineous  ?? ??Exudate Odor?- None  ?? ??Edge?- Poorly defined  ?? ??Surrounding Tissue Color?- Erythema  ?? ??Surrounding Tissue Condition?- Excoriated, Friable  ?? ??Status?- Improving  ?? ??Topical Agent Application?- Gel  ?  ?3. Ear Left Posterior Abcess?- last charted: 09/01/2020 11:17  ?? ??Assessment Done By?- Wound Care Team  ?? ??Dressing Type?- None  ?? ??Cleansing?- Cleaned with normal saline  ?? ??Wound Bed Tissue Type?- Epithelialized  ?? ??Exudate Amount?- None  ?? ??Surrounding Tissue Condition?- Intact  ?? ??Status?- Healed  ?  Assessment/Plan  1.?Abscess, earlobe?H60.00  ?Resolved.  2.?Pressure ulcer of right ischium?L89.319  ?Use collagen matrix dressing?and change every other day.  3.?DM (diabetes mellitus)?E11.9  Orders:  Wound Care Outpatient, *Est. 09/15/20 3:00:00 CDT, *Est. Stop date 09/15/20 3:00:00 CDT, [facility], Return to Clinic 2 weeks  Wound Care Team Treatment, 09/01/20 11:52:00 CDT, Stop date 09/01/20 11:52:00 CDT, Apply collagen matrix dressing to right buttock wounds and change every other day. Continue offloading maneuvers.   Problem List/Past Medical History  Ongoing  Chronic skin ulcer  DM (diabetes mellitus)  Neurogenic bladder  Obesity  Open wound of abdomen  Pressure ulcer of heel  Pressure ulcer of right ischium  Quadriplegia  Quadriplegic spinal paralysis  Skin eschar  Historical  Pressure ulcer of right foot stage  3  Medications  Bactroban 2% topical ointment, 1 aruna, TOP, TID,? ?Not Taking, Completed Rx  BASAGLAR INJ 100UNIT  Bydureon BCise 2 mg/0.85 mL subcutaneous suspension, extended release,? ?Not Taking per Prescriber  Ciprofloxacin Hcl 500 Mg Tab, 500 mg= 1 tab(s), BID,? ?Not Taking, Completed Rx  clindamycin 150 mg oral capsule, 300 mg= 2 cap(s), Oral, q6hr,? ?Not Taking, Completed Rx  gentamicin 0.1% topical ointment, 1 aruna, TOP, Daily,? ?Not Taking, Completed Rx  gentamicin 0.1% topical ointment, 1 aruna, TOP, Daily, 2 refills  GLIPIZIDE TAB 10MG, 10 mg= 1 tab(s), Oral, BID,? ?Not Taking per Prescriber  JANUVIA TAB 100MG, 100 mg= 1 tab(s), Oral, Daily,? ?Not Taking per Prescriber  Januvia 50 mg oral tablet, 50 mg= 1 tab(s), Oral, Daily  Ketoconazole 2% Shampoo,? ?Not Taking, Completed Rx  Levofloxacin 500 Mg Tablet, 500 mg= 1 tab(s), Oral, Daily,? ?Not Taking, Completed Rx  MUPIROCIN OIN 2%,? ?Not taking  MYRBETRIQ TAB 50MG, 50 mg= 1 tab(s), Oral, Daily  OXYBUTYNIN TAB 5MG,? ?Not Taking per Prescriber  ReliOn/NovoLIN R 100 units/mL injectable solution  sulfamethoxazole-trimethoprim 800 mg-160 mg oral tablet, 1 tab(s), Oral, BID,? ?Not Taking, Completed Rx  Tramadol Hcl Tab 50 Mg, 50 mg= 1 tab(s), Oral, q4-6hr,? ?Not Taking, Completed Rx  Allergies  No Known Allergies  Social History  Abuse/Neglect  No, 05/19/2020  Tobacco  Never (less than 100 in lifetime), N/A, 05/19/2020  Never smoker, 05/01/2017  Health Maintenance  Health Maintenance  ???Pending?(in the next year)  ??? ??OverDue  ??? ? ? ?Obesity Screening due??01/01/20??and every 1??year(s)  ??? ? ? ?Alcohol Misuse Screening due??01/02/20??and every 1??year(s)  ??? ??Due?  ??? ? ? ?Aspirin Therapy for CVD Prevention due??09/01/20??and every 1??year(s)  ??? ? ? ?Medicare Annual Wellness Exam due??09/01/20??and every 1??year(s)  ??? ? ? ?Tetanus Vaccine due??09/01/20??and every 10??year(s)  ??? ??Due In Future?  ??? ? ? ?ADL Screening not due until??05/19/21??and  every 1??year(s)  ???Satisfied?(in the past 1 year)  ??? ??Satisfied?  ??? ? ? ?ADL Screening on??05/19/20.??Satisfied by Jolynn Madrid LPN  ??? ? ? ?Blood Pressure Screening on??09/01/20.??Satisfied by Jolynn Madrid LPN  ?

## 2022-05-04 NOTE — HISTORICAL [facility] CERNER
This is a historical note converted from Becca. Formatting and pictures may have been removed.  Please reference Becca for original formatting and attached multimedia. Chief Complaint  pressure ulcer R heel; vesico-cutaneous fistula  History of Present Illness  See nurses notes  Review of Systems  See nurses notes  Physical Exam  Vitals & Measurements  T:?36.6? ?C (Oral)? HR:?90(Peripheral)? RR:?18? BP:?129/82? SpO2:?98%?  ?  Assessment/Plan  1.?Pressure ulcer of right heel?L89.619  ?Incision/Wounds  ?1. Abdomen Lower, Other: surgical incision?- last charted: 07/31/2019 11:20  ?? ??Assessment Done By?- Wound Care Team  ?? ??Dressing Type?- Gauze dressing, Tape  ?? ??Dressing Assessment?- Drainage present, Intact  ?? ??Dressing Activity?- Changed  ?? ??Cleansing?- Cleaned with normal saline, Irrigated with normal saline  ?? ??Length?- 0.2 cm  ?? ??Width?- 0.2 cm  ?? ??Depth?- 0.1 cm  ?? ??Wound Bed Tissue Type?- Pale Pink  ?? ??Percent Granulated?- 95 %  ?? ??Percent Epithelialized?- 5 %  ?? ??Exudate Amount?- Moderate  ?? ??Exudate Type?- Serous  ?? ??Exudate Odor?- None  ?? ??Edge?- Well defined  ?? ??Surrounding Tissue Color?- Normal  ?? ??Surrounding Tissue Condition?- Intact  ?  ?2. Foot Right Plantar Blister?- last charted: 07/31/2019 11:20  ?? ??Assessment Done By?- Wound Care Team  ?? ??Pressure Point?- Bony prominence  ?? ??Dressing Type?- Collagen, Foam  ?? ??Dressing Assessment?- Drainage present, Intact  ?? ??Dressing Activity?- Changed  ?? ??Cleansing?- Cleaned with normal saline  ?? ??Length?- 1.7 cm  ?? ??Width?- 3.7 cm  ?? ??Depth?- 0.1 cm  ?? ??Wound Bed Tissue Type?- Granulating, Necrotic tissue, slough, Pale Pink, Scab  ?? ??Percent Granulated?- 80 %  ?? ??Percent Necrotic Tissue Slough?- 20 %  ?? ??Pressure Ulcer Stage?- III  ?Will use Mesalt daily on the heel wound.? There is a little slough in the wound base.? 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 He has an appointment with the urologist in?[place] on 23 August.? The heel wound?will be treated daily. ?Return in 1 week.  Orders:  Wound Care Outpatient, *Est. 08/07/19 3:00:00 CDT, *Est. Stop date 08/07/19 3:00:00 CDT, [facility], FU with Physician in 1 week  Wound Care Team Treatment, 07/31/19 11:53:00 CDT, Stop date 07/31/19 11:53:00 CDT, Mesalt to R heel q day. cover dressing to fistula q day or prn   Problem List/Past Medical History  Ongoing  Chronic skin ulcer  DM (diabetes mellitus)  Open wound of abdomen  Quadriplegia  Quadriplegic spinal paralysis  Historical  No qualifying data  Medications  Bactroban 2% topical ointment, 1 aruna, TOP, TID,? ?Not Taking, Completed Rx  BASAGLAR INJ 100UNIT  Bydureon BCise 2 mg/0.85 mL subcutaneous suspension, extended release  Ciprofloxacin Hcl 500 Mg Tab, 500 mg= 1 tab(s), BID,? ?Not Taking, Completed Rx  clindamycin 150 mg oral capsule, 300 mg= 2 cap(s), Oral, q6hr,? ?Not Taking, Completed Rx  GLIPIZIDE TAB 10MG, 10 mg= 1 tab(s), Oral, BID,? ?Not Taking per Prescriber  JANUVIA TAB 100MG, 100 mg= 1 tab(s), Oral, Daily,? ?Not Taking per Prescriber  Ketoconazole 2% Shampoo,? ?Not Taking, Completed Rx  Levofloxacin 500 Mg Tablet, 500 mg= 1 tab(s), Oral, Daily,? ?Not Taking, Completed Rx  MUPIROCIN OIN 2%,? ?Not taking  MYRBETRIQ TAB 50MG, 50 mg= 1 tab(s), Oral, Daily  OXYBUTYNIN TAB 5MG,? ?Not Taking per Prescriber  ReliOn/NovoLIN R 100 units/mL injectable solution  Tramadol Hcl Tab 50 Mg, 50 mg= 1 tab(s), Oral, q4-6hr  Allergies  No Known Allergies  Social History  Tobacco  Never smoker, 05/01/2017  Health Maintenance  Health Maintenance  ???Pending?(in the next year)  ??? ??OverDue  ??? ? ? ?Alcohol Misuse Screening due??01/01/19??and every 1??year(s)  ??? ? ? ?Obesity Screening due??01/01/19??and every 1??year(s)  ??? ? ? ?ADL Screening due??05/30/19??and every 1??year(s)  ??? ??Due?  ??? ? ? ?Aspirin Therapy  for CVD Prevention due??07/31/19??and every 1??year(s)  ??? ? ? ?Tetanus Vaccine due??07/31/19??and every 10??year(s)  ???Satisfied?(in the past 1 year)  ??? ??Satisfied?  ??? ? ? ?Blood Pressure Screening on??07/31/19.??Satisfied by Jolynn Madrid LPN  ?

## 2022-05-04 NOTE — HISTORICAL [facility] CERNER
This is a historical note converted from Becca. Formatting and pictures may have been removed.  Please reference [facility] for original formatting and attached multimedia. History of Present Illness  see notes  Review of Systems  see nurse notes  Physical Exam  Vitals & Measurements  T:?36.7? ?C (Oral)? HR:?127(Peripheral)? RR:?18? BP:?113/82? SpO2:?96%?  ?  wound is looking good.  Assessment/Plan  1.?Pressure ulcer of right heel, stage 3?L89.613  ?Incision/Wounds  ?1. Heel Right Pressure ulcer?- last charted: 09/07/2021 11:07  ?? ??Assessment Done By?- Wound Care Team  ?? ??Pressure Point?- Bony prominence  ?? ??Dressing Type?- ABD dressing pad, Collagen, Gauze dressing, Medicated packing strips, Rolled Gauze, Tape, Tubular bandage  ?? ??Dressing Assessment?- Drainage present, Intact  ?? ??Dressing Activity?- Changed  ?? ??Cleansing?- Cleaned with normal saline  ?? ??Length?- 3.0 cm  ?? ??Width?- 5.0 cm  ?? ??Depth?- 0.1 cm  ?? ??Wound Bed Tissue Type?- Blood Blister, Granulating, Scab  ?? ??Pressure Ulcer Stage?- III  ?? ??Exudate Amount?- Moderate  ?? ??Exudate Type?- Serosanguineous  ?? ??Exudate Odor?- None  ?? ??Edge?- Well defined  ?? ??Surrounding Tissue Color?- Normal  ?? ??Surrounding Tissue Condition?- Dry, Intact  ?? ??Status?- Unchanged  ?Pt examined and notes review above. Continue collagen QOD. RTC in 2 weeks. COntinue double tubigrip E. To see [facility]IS 9/20 .  Ordered:  Wound Care Outpatient, *Est. 09/21/21 3:00:00 CDT, *Est. Stop date 09/21/21 3:00:00 CDT, [facility], Return to Clinic 2 weeks  Wound Care Team Treatment, 09/07/21 11:23:00 CDT, Stop date 09/07/21 11:23:00 CDT, continue collagen QOD. RTC in 2 weeks. Tubigrip E double for light compression.  ?  2.?Lymphedema of leg?I89.0  Ordered:  Wound Care Outpatient, *Est. 09/21/21 3:00:00 CDT, *Est. Stop date 09/21/21 3:00:00 CDT, [facility], Return to Clinic 2 weeks  Wound Care Team Treatment, 09/07/21 11:23:00 CDT, Stop date 09/07/21  11:23:00 CDT, continue collagen QOD. RTC in 2 weeks. Tubigrip E double for light compression.  ?  3.?Quadriplegia?G82.50  Ordered:  Wound Care Outpatient, *Est. 09/21/21 3:00:00 CDT, *Est. Stop date 09/21/21 3:00:00 CDT, [facility], Return to Clinic 2 weeks  Wound Care Team Treatment, 09/07/21 11:23:00 CDT, Stop date 09/07/21 11:23:00 CDT, continue collagen QOD. RTC in 2 weeks. Tubigrip E double for light compression.  ?   Problem List/Past Medical History  Ongoing  Chronic skin ulcer  DM (diabetes mellitus)  Infected decubitus ulcer  Lymphedema of leg  Neurogenic bladder  Obesity  Open wound of abdomen  Pressure ulcer of heel  Pressure ulcer of right ischium  Quadriplegia  Quadriplegic spinal paralysis  Skin eschar  Historical  Pressure ulcer of right foot stage 3  Medications  Bactroban 2% topical ointment, 1 aruna, TOP, TID,? ?Not Taking, Completed Rx  BASAGLAR INJ 100UNIT  Bydureon BCise 2 mg/0.85 mL subcutaneous suspension, extended release,? ?Not Taking per Prescriber  Ciprofloxacin Hcl 500 Mg Tab, 500 mg= 1 tab(s), BID,? ?Not Taking, Completed Rx  clindamycin 150 mg oral capsule, 300 mg= 2 cap(s), Oral, q6hr,? ?Not Taking, Completed Rx  gentamicin 0.1% topical ointment, 1 aruna, TOP, Daily,? ?Not Taking, Completed Rx  gentamicin 0.1% topical ointment, 1 aruna, TOP, Daily, 2 refills  GLIPIZIDE TAB 10MG, 10 mg= 1 tab(s), Oral, BID,? ?Not Taking per Prescriber  JANUVIA TAB 100MG, 100 mg= 1 tab(s), Oral, Daily,? ?Not Taking per Prescriber  Januvia 50 mg oral tablet, 50 mg= 1 tab(s), Oral, Daily  Ketoconazole 2% Shampoo,? ?Not Taking, Completed Rx  Levofloxacin 500 Mg Tablet, 500 mg= 1 tab(s), Oral, Daily,? ?Not Taking, Completed Rx  MUPIROCIN OIN 2%,? ?Not taking  MYRBETRIQ TAB 50MG, 50 mg= 1 tab(s), Oral, Daily  OXYBUTYNIN TAB 5MG,? ?Not Taking per Prescriber  ReliOn/NovoLIN R 100 units/mL injectable solution  sulfamethoxazole-trimethoprim 800 mg-160 mg oral tablet, 1 tab(s), Oral, BID,? ?Not Taking,  Completed Rx  Tramadol Hcl Tab 50 Mg, 50 mg= 1 tab(s), Oral, q4-6hr,? ?Not Taking, Completed Rx  Allergies  No Known Allergies  Social History  Abuse/Neglect  No, 05/19/2020  Tobacco  Never (less than 100 in lifetime), N/A, 05/19/2020  Never smoker, 05/01/2017  Health Maintenance  Health Maintenance  ???Pending?(in the next year)  ??? ??OverDue  ??? ? ? ?Obesity Screening due??01/01/21??and every 1??year(s)  ??? ? ? ?Alcohol Misuse Screening due??01/02/21??and every 1??year(s)  ??? ? ? ?ADL Screening due??05/19/21??and every 1??year(s)  ??? ??Due?  ??? ? ? ?Aspirin Therapy for CVD Prevention due??09/07/21??and every 1??year(s)  ??? ? ? ?Medicare Annual Wellness Exam due??09/07/21??and every 1??year(s)  ??? ? ? ?Tetanus Vaccine due??09/07/21??and every 10??year(s)  ???Satisfied?(in the past 1 year)  ??? ??Satisfied?  ??? ? ? ?Blood Pressure Screening on??09/07/21.??Satisfied by Sally MADDOX, Ca Sánchez  ?

## 2022-05-04 NOTE — HISTORICAL [facility] CERNER
This is a historical note converted from Becca. Formatting and pictures may have been removed.  Please reference Becca for original formatting and attached multimedia. Chief Complaint  C/O recurring pressure ulcer to heel  Rt. buttock, ?ischial ulcer?  History of Present Illness  Patient returns for?management?of?gluteal?decubiti ulcers.? Additionally?his caregiver is concerned about the new development of an ulcer on his?right heel.  Review of Systems  Not significantly different than history of present and past medical illnesses.  Physical Exam  Vitals & Measurements  T:?36.8? ?C (Oral)? HR:?130(Peripheral)? RR:?20? BP:?124/89? SpO2:?96%?  HT:?172.7?cm? WT:?102?kg?  On examination?the patients?right gluteal?wounds have?areas of?thin eschar formation?which are?starting to?desquamate.? There is moderate periwound?xerotic changes.? There is?small areas of denudation. ?There is no evidence of infection.  ?  Inspection of his right heel?reveals?moderate blood blister?and?2 smaller?shallow ulcers.? There there is a xerotic heel changes?with?slight desquamation also present. ?There is no evidence of infection.  Incision/Wounds  ?1. Buttock Right Inner?- last charted: 12/15/2020 13:19  ?? ??Assessment Done By?- Wound Care Team  ?? ??Pressure Point?- Bony prominence  ?? ??Dressing Type?- ABD dressing pad, Gauze dressing, Medicated packing strips, Tape  ?? ??Dressing Assessment?- Drainage present, Intact  ?? ??Dressing Activity?- Changed  ?? ??Cleansing?- Cleaned with normal saline  ?? ??Length?- 4 cm  ?? ??Width?- 4.5 cm  ?? ??Depth?- 0.2 cm  ?? ??Wound Bed Tissue Type?- Dry, Epithelialized, Friable, Granulating, Necrotic tissue, eschar, Pale Pink, Scab  ?? ??Percent Granulated?- 20 %  ?? ??Percent Other Tissue?- 80 %  ?? ??Pressure Ulcer Stage?- IV  ?? ??Exudate Amount?- Scant  ?? ??Exudate Type?- Serous  ?? ??Exudate Odor?- None  ?? ??Edge?- Poorly defined  ?? ??Surrounding Tissue Color?- Normal  ?? ??Surrounding  Tissue Condition?- Dry, Excoriated, Friable  ?? ??Status?- Improving  ?  ?2. Heel Right Pressure ulcer?- last charted: 12/15/2020 13:19  ?? ??Assessment Done By?- Wound Care Team  ?? ??Abnormality Pattern?- Clustered  ?? ??Pressure Point?- Bony prominence  ?? ??Dressing Type?- Absorptive  ?? ??Dressing Assessment?- Dry, Intact  ?? ??Dressing Activity?- Changed  ?? ??Cleansing?- Cleaned with normal saline  ?? ??Length?- 9 cm  ?? ??Width?- 3 cm  ?? ??Wound Bed Tissue Type?- Blood Blister, Epithelialized, Necrotic tissue, eschar, Scab  ?? ??Pressure Ulcer Stage?- Suspected deep tissue injury  ?? ??Exudate Amount?- None  ?? ??Exudate Odor?- None  ?? ??Edge?- Well defined  ?? ??Surrounding Tissue Color?- Erythema  ?? ??Surrounding Tissue Condition?- Intact  ?  Assessment/Plan  1.?Pressure ulcer of right ischium?L89.319  ?Today the patient underwent selective debridement of the?right buttock?wound.? All of the?fibrous necrotic tissue is identified was?successfully removed using?scissors, pickups and?saline soaked 4 x 4s.? Local wound care will consist of the application of?Aquaphor to the dyshidrotic areas?cover dressing overlying.  Ordered:  Wound Care Outpatient, *Est. 12/22/20 3:00:00 CST, *Est. Stop date 12/22/20 3:00:00 CST, [facility], FU with Physician in 1 week  Wound Care Team Treatment, 12/15/20 14:12:00 CST, Stop date 12/15/20 14:12:00 CST, Apply Aquaphor and a cover up dressing to buttock wound and change daily. Apply Mepitel and medical honey to right heel wound with cover up dressing. Apply medical honey every other day. Allow...  ?  2.?Pressure ulcer of heel?L89.609  ?The patient underwent selective debridement of the?right heel ulcer?using saline soaked 4 x 4s. ?The?blood blister?fibrin content was?successfully evacuated from the?wound base.? Local wound care will consist of the application of Mepitel?and application of medical honey to further assist with debridement.  Ordered:  Wound Care  Outpatient, *Est. 12/22/20 3:00:00 CST, *Est. Stop date 12/22/20 3:00:00 CST, [facility], FU with Physician in 1 week  Wound Care Team Treatment, 12/15/20 14:12:00 CST, Stop date 12/15/20 14:12:00 CST, Apply Aquaphor and a cover up dressing to buttock wound and change daily. Apply Mepitel and medical honey to right heel wound with cover up dressing. Apply medical honey every other day. Allow...  ?  3.?DM (diabetes mellitus)?E11.9  ?Continue current therapy.  Ordered:  Wound Care Outpatient, *Est. 12/22/20 3:00:00 CST, *Est. Stop date 12/22/20 3:00:00 CST, [facility], FU with Physician in 1 week  Wound Care Team Treatment, 12/15/20 14:12:00 CST, Stop date 12/15/20 14:12:00 CST, Apply Aquaphor and a cover up dressing to buttock wound and change daily. Apply Mepitel and medical honey to right heel wound with cover up dressing. Apply medical honey every other day. Allow...  ?   Problem List/Past Medical History  Ongoing  Chronic skin ulcer  DM (diabetes mellitus)  Neurogenic bladder  Obesity  Open wound of abdomen  Pressure ulcer of heel  Pressure ulcer of right ischium  Quadriplegia  Quadriplegic spinal paralysis  Skin eschar  Historical  Pressure ulcer of right foot stage 3  Medications  Bactroban 2% topical ointment, 1 aruna, TOP, TID,? ?Not Taking, Completed Rx  BASAGLAR INJ 100UNIT  Bydureon BCise 2 mg/0.85 mL subcutaneous suspension, extended release,? ?Not Taking per Prescriber  Ciprofloxacin Hcl 500 Mg Tab, 500 mg= 1 tab(s), BID,? ?Not Taking, Completed Rx  clindamycin 150 mg oral capsule, 300 mg= 2 cap(s), Oral, q6hr,? ?Not Taking, Completed Rx  gentamicin 0.1% topical ointment, 1 aruna, TOP, Daily,? ?Not Taking, Completed Rx  gentamicin 0.1% topical ointment, 1 aruna, TOP, Daily, 2 refills  GLIPIZIDE TAB 10MG, 10 mg= 1 tab(s), Oral, BID,? ?Not Taking per Prescriber  JANUVIA TAB 100MG, 100 mg= 1 tab(s), Oral, Daily,? ?Not Taking per Prescriber  Januvia 50 mg oral tablet, 50 mg= 1 tab(s), Oral,  Daily  Ketoconazole 2% Shampoo,? ?Not Taking, Completed Rx  Levofloxacin 500 Mg Tablet, 500 mg= 1 tab(s), Oral, Daily,? ?Not Taking, Completed Rx  MUPIROCIN OIN 2%,? ?Not taking  MYRBETRIQ TAB 50MG, 50 mg= 1 tab(s), Oral, Daily  OXYBUTYNIN TAB 5MG,? ?Not Taking per Prescriber  ReliOn/NovoLIN R 100 units/mL injectable solution  sulfamethoxazole-trimethoprim 800 mg-160 mg oral tablet, 1 tab(s), Oral, BID,? ?Not Taking, Completed Rx  Tramadol Hcl Tab 50 Mg, 50 mg= 1 tab(s), Oral, q4-6hr,? ?Not Taking, Completed Rx  Allergies  No Known Allergies  Social History  Abuse/Neglect  No, 05/19/2020  Tobacco  Never (less than 100 in lifetime), N/A, 05/19/2020  Never smoker, 05/01/2017  Health Maintenance  Health Maintenance  ???Pending?(in the next year)  ??? ??OverDue  ??? ? ? ?Obesity Screening due??01/01/20??and every 1??year(s)  ??? ? ? ?Alcohol Misuse Screening due??01/02/20??and every 1??year(s)  ??? ??Due?  ??? ? ? ?Aspirin Therapy for CVD Prevention due??12/15/20??and every 1??year(s)  ??? ? ? ?Medicare Annual Wellness Exam due??12/15/20??and every 1??year(s)  ??? ? ? ?Tetanus Vaccine due??12/15/20??and every 10??year(s)  ??? ??Due In Future?  ??? ? ? ?ADL Screening not due until??05/19/21??and every 1??year(s)  ???Satisfied?(in the past 1 year)  ??? ??Satisfied?  ??? ? ? ?ADL Screening on??05/19/20.??Satisfied by Jolynn Madrid LPN  ??? ? ? ?Blood Pressure Screening on??12/15/20.??Satisfied by Junior MADDOX, Nguyen Nunn  ?

## 2022-05-04 NOTE — HISTORICAL [facility] CERNER
This is a historical note converted from Becca. Formatting and pictures may have been removed.  Please reference Becca for original formatting and attached multimedia. Chief Complaint  C/O recurring pressure ulcer to heel  Rt. buttock, ?ischial ulcer?  History of Present Illness  Patient has been using local wound care modalities to his right ischium and right heel as recommended.  Review of Systems  Not significantly different than history of present and past medical illnesses.  Physical Exam  Vitals & Measurements  T:?36.8? ?C (Oral)? HR:?133(Peripheral)? RR:?18? BP:?103/74? SpO2:?99%?  HT:?172.7?cm? WT:?102?kg?  On examination the patient has?area of denudation overlying the right issue him?with an adjacent?area of thin eschar?surrounded by granulating tissue.? There is no evidence of induration?or fluctuance.? Examination of his right heel reveals a granulating wound which has significantly contracted in size.  ?  Incision/Wounds  ?1. Heel Right Pressure ulcer?- last charted: 05/26/2020 13:14  ?? ??Assessment Done By?- Wound Care Team  ?? ??Abnormality Pattern?- Clustered, Flat  ?? ??Pressure Point?- Bony prominence  ?? ??Dressing Type?- None  ?? ??Cleansing?- Cleaned with normal saline  ?? ??Length?- 1.0 cm  ?? ??Width?- 0.8 cm  ?? ??Depth?- 0.1 cm  ?? ??Wound Bed Tissue Type?- Granulating  ?? ??Percent Granulated?- 100 %  ?? ??Exudate Amount?- Small  ?? ??Exudate Type?- Sanguineous  ?? ??Exudate Odor?- None  ?? ??Edge?- Attached to wound bed  ?? ??Surrounding Tissue Color?- Normal  ?? ??Surrounding Tissue Condition?- Edematous  ?  ?2. Right Other: ischial pressure ulcer?- last charted: 05/26/2020 13:14  ?? ??Assessment Done By?- Wound Care Team  ?? ??Abnormality Pattern?- Flat  ?? ??Pressure Point?- Bony prominence  ?? ??Dressing Type?- Gauze dressing  ?? ??Dressing Assessment?- Dry, Intact  ?? ??Dressing Activity?- Removed  ?? ??Cleansing?- Cleaned with normal saline  ?? ??Length?- 2.0 cm  ?? ??Width?- 2.0  cm  ?? ??Depth?- 0.1 cm  ?? ??Wound Bed Tissue Type?- Friable, Necrotic tissue, eschar, Necrotic tissue, slough, Pale Pink  ?? ??Percent Necrotic Tissue Slough?- 30 %  ?? ??Percent Necrotic Tissue Eschar?- 60 %  ?? ??Percent Other Tissue?- 10 %  ?? ??Pressure Ulcer Stage?- Unstageable  ?? ??Exudate Amount?- Small  ?? ??Exudate Type?- Serosanguineous  ?? ??Exudate Odor?- None  ?? ??Edge?- Attached to wound bed, Poorly defined  ?? ??Surrounding Tissue Color?- Erythema  ?? ??Surrounding Tissue Condition?- Denuded, Excoriated, Friable, Moist  ?? ??Status?- Improving  ?  Assessment/Plan  1.?Quadriplegia?G82.50  ?Continue offloading maneuvers.  2.?Pressure ulcer of heel?L89.609  ?Apply collagen matrix dressing to wound and change every other day.  3.?Skin eschar?R23.4  ?Continue using?medical honey?to?assist with debridement.  4.?Pressure ulcer of right ischium?L89.319  ?Continue offloading maneuvers and local wound care Monday?for his as recommended below.  5.?Neurogenic bladder?N31.9  ?Continue self?catheterization.? Notify PMD if?symptoms increase and are associated with?signs of infection.  6.?DM (diabetes mellitus)?E11.9  Continue current therapy.  Orders:  Wound Care Outpatient, *Est. 06/02/20 3:00:00 CDT, *Est. Stop date 06/02/20 3:00:00 CDT, [facility], FU with Physician in 1 week  Wound Care Team Treatment, 05/26/20 13:32:00 CDT, Stop date 05/26/20 13:32:00 CDT, Apply medical honey to right ischium eschar. Apply Mepilex Ag foam to issue will denudation and change daily to every other day as necessary. Apply collagen dressing to right heel wound and felipe...   Problem List/Past Medical History  Ongoing  Chronic skin ulcer  DM (diabetes mellitus)  Neurogenic bladder  Obesity  Open wound of abdomen  Pressure ulcer of heel  Pressure ulcer of right ischium  Quadriplegia  Quadriplegic spinal paralysis  Skin eschar  Historical  Pressure ulcer of right foot stage 3  Medications  Bactroban 2% topical ointment, 1  aruna, TOP, TID,? ?Not Taking, Completed Rx  BASAGLAR INJ 100UNIT  Bydureon BCise 2 mg/0.85 mL subcutaneous suspension, extended release,? ?Not Taking per Prescriber  Ciprofloxacin Hcl 500 Mg Tab, 500 mg= 1 tab(s), BID,? ?Not Taking, Completed Rx  clindamycin 150 mg oral capsule, 300 mg= 2 cap(s), Oral, q6hr,? ?Not Taking, Completed Rx  doxycycline hyclate 100 mg oral capsule, 100 mg= 1 cap(s), Oral, BID  GLIPIZIDE TAB 10MG, 10 mg= 1 tab(s), Oral, BID,? ?Not Taking per Prescriber  JANUVIA TAB 100MG, 100 mg= 1 tab(s), Oral, Daily,? ?Not Taking per Prescriber  Januvia 50 mg oral tablet, 50 mg= 1 tab(s), Oral, Daily  Ketoconazole 2% Shampoo,? ?Not Taking, Completed Rx  Levofloxacin 500 Mg Tablet, 500 mg= 1 tab(s), Oral, Daily,? ?Not Taking, Completed Rx  MUPIROCIN OIN 2%,? ?Not taking  MYRBETRIQ TAB 50MG, 50 mg= 1 tab(s), Oral, Daily  OXYBUTYNIN TAB 5MG,? ?Not Taking per Prescriber  ReliOn/NovoLIN R 100 units/mL injectable solution  Tramadol Hcl Tab 50 Mg, 50 mg= 1 tab(s), Oral, q4-6hr,? ?Not Taking, Completed Rx  Allergies  No Known Allergies  Social History  Abuse/Neglect  No, 05/19/2020  Tobacco  Never (less than 100 in lifetime), N/A, 05/19/2020  Never smoker, 05/01/2017  Health Maintenance  Health Maintenance  ???Pending?(in the next year)  ??? ??OverDue  ??? ? ? ?Alcohol Misuse Screening due??01/01/20??and every 1??year(s)  ??? ? ? ?Obesity Screening due??01/01/20??and every 1??year(s)  ??? ??Due?  ??? ? ? ?Aspirin Therapy for CVD Prevention due??05/26/20??and every 1??year(s)  ??? ? ? ?Medicare Annual Wellness Exam due??05/26/20??and every 1??year(s)  ??? ? ? ?Tetanus Vaccine due??05/26/20??and every 10??year(s)  ??? ??Due In Future?  ??? ? ? ?ADL Screening not due until??05/19/21??and every 1??year(s)  ???Satisfied?(in the past 1 year)  ??? ??Satisfied?  ??? ? ? ?ADL Screening on??05/19/20.??Satisfied by Jolynn Madrid LPN  ??? ? ? ?Blood Pressure Screening on??05/26/20.??Satisfied by Junior MADDOX, Nguyen  Arline  ?

## 2022-05-04 NOTE — HISTORICAL [facility] CERNER
This is a historical note converted from Becca. Formatting and pictures may have been removed.  Please reference Becca for original formatting and attached multimedia. Physical Exam  Vitals & Measurements  T:?36.8? ?C (Oral)? HR:?133(Peripheral)? RR:?18? BP:?137/104? SpO2:?97%?  ?  Assessment/Plan  1.?Pressure injury of heel, stage 3?L89.603  ?Incision/Wounds  ?1. Heel Right Pressure ulcer?- last charted: 02/05/2020 13:00  ?? ??Assessment Done By?- Wound Care Team  ?? ??Pressure Point?- Bony prominence  ?? ??Dressing Type?- Foam, Medicated packing strips  ?? ??Dressing Assessment?- Drainage present, Intact  ?? ??Dressing Activity?- Removed  ?? ??Cleansing?- Cleaned with normal saline  ?? ??Length?- 6.5 cm  ?? ??Width?- 7.5 cm  ?? ??Depth?- 0.1 cm  ?? ??Wound Bed Tissue Type?- Blister, Epithelialized, Pale Pink  ?? ??Percent Granulated?- 50 %  ?? ??Percent Other Tissue?- 50 %  ?? ??Pressure Ulcer Stage?- III  ?? ??Exudate Amount?- Moderate  ?? ??Exudate Type?- Serous  ?? ??Exudate Odor?- None  ?? ??Edge?- Attached to wound bed  ?? ??Surrounding Tissue Color?- Normal  ?? ??Surrounding Tissue Condition?- Edematous, Intact  ?pt presents with recurrent pressure injury to the right heel no sign of infeciton will use mepilex ag foam q od and check prealbumin fu with me in one week  Ordered:  Prealbumin, Routine collect, 02/05/20 13:53:00 CST, Blood, Order for future visit, Stop date 02/05/20 13:53:00 CST, Lab Collect, Pressure injury of heel, stage 3, 02/05/20 13:53:00 CST  ?  Orders:  Wound Care Outpatient, *Est. 02/12/20 3:00:00 CST, *Est. Stop date 02/12/20 3:00:00 CST, [facility], FU with Physician in 1 week  Wound Care Team Treatment, 02/05/20 13:52:00 CST, Stop date 02/05/20 13:52:00 CST, mepilex ag foam to heal q od   Problem List/Past Medical History  Ongoing  Chronic skin ulcer  DM (diabetes mellitus)  Open wound of abdomen  Pressure ulcer of right foot stage 3  Quadriplegia  Quadriplegic spinal  paralysis  Historical  No qualifying data  Medications  Bactroban 2% topical ointment, 1 aruna, TOP, TID,? ?Not Taking, Completed Rx  BASAGLAR INJ 100UNIT  Bydureon BCise 2 mg/0.85 mL subcutaneous suspension, extended release  Ciprofloxacin Hcl 500 Mg Tab, 500 mg= 1 tab(s), BID,? ?Not Taking, Completed Rx  clindamycin 150 mg oral capsule, 300 mg= 2 cap(s), Oral, q6hr,? ?Not Taking, Completed Rx  GLIPIZIDE TAB 10MG, 10 mg= 1 tab(s), Oral, BID,? ?Not Taking per Prescriber  JANUVIA TAB 100MG, 100 mg= 1 tab(s), Oral, Daily,? ?Not Taking per Prescriber  Ketoconazole 2% Shampoo,? ?Not Taking, Completed Rx  Levofloxacin 500 Mg Tablet, 500 mg= 1 tab(s), Oral, Daily,? ?Not Taking, Completed Rx  MUPIROCIN OIN 2%,? ?Not taking  MYRBETRIQ TAB 50MG, 50 mg= 1 tab(s), Oral, Daily  OXYBUTYNIN TAB 5MG,? ?Not Taking per Prescriber  ReliOn/NovoLIN R 100 units/mL injectable solution  Tramadol Hcl Tab 50 Mg, 50 mg= 1 tab(s), Oral, q4-6hr  Allergies  No Known Allergies  Social History  Tobacco  Never smoker, 05/01/2017  Health Maintenance  Health Maintenance  ???Pending?(in the next year)  ??? ??OverDue  ??? ? ? ?ADL Screening due??05/30/19??and every 1??year(s)  ??? ??Due?  ??? ? ? ?Alcohol Misuse Screening due??01/01/20??and every 1??year(s)  ??? ? ? ?Obesity Screening due??01/01/20??and every 1??year(s)  ??? ? ? ?Aspirin Therapy for CVD Prevention due??02/05/20??and every 1??year(s)  ??? ? ? ?Tetanus Vaccine due??02/05/20??and every 10??year(s)  ???Satisfied?(in the past 1 year)  ??? ??Satisfied?  ??? ? ? ?Blood Pressure Screening on??02/05/20.??Satisfied by Junior MADDOX, Nguyen Nunn  ?

## 2022-05-04 NOTE — HISTORICAL [facility] CERNER
This is a historical note converted from Becca. Formatting and pictures may have been removed.  Please reference Becca for original formatting and attached multimedia. Chief Complaint  C/O recurring pressure ulcer to heel  Rt. buttock, ?ischial ulcer?  History of Present Illness  She returns for continued management of a chronic?pressure ulcer on his right buttock.  Physical Exam  Vitals & Measurements  T:?36.8? ?C (Oral)? HR:?133(Peripheral)? RR:?18? BP:?159/107? SpO2:?97%?  HT:?172.7?cm? WT:?102?kg?  On examination his?wound has?debrided fairly nicely?although there is residual?hypertrophic scarring?and?moderate desquamation?with a few areas of?epithelialization.? There is no evidence of obvious infection. ?He continues to have moderate?wetness?extensively involving the perianal and? gluteal areas.  ?  Incision/Wounds  ?1. Buttock Right Inner?- last charted: 11/17/2020 13:33  ?? ??Assessment Done By?- Wound Care Team  ?? ??Pressure Point?- Bony prominence  ?? ??Dressing Type?- ABD dressing pad, Gauze dressing, Honey, Medicated packing strips, Tape  ?? ??Dressing Assessment?- Drainage present, Intact  ?? ??Dressing Activity?- Changed  ?? ??Cleansing?- Cleaned with normal saline  ?? ??Length?- 5 cm  ?? ??Width?- 3 cm  ?? ??Depth?- 0.2 cm  ?? ??Wound Bed Tissue Type?- Epithelialized, Friable, Granulating, Moist, Pale Pink, Scab  ?? ??Percent Granulated?- 40 %  ?? ??Percent Other Tissue?- 60 %  ?? ??Pressure Ulcer Stage?- IV  ?? ??Exudate Amount?- Moderate  ?? ??Exudate Type?- Serosanguineous  ?? ??Exudate Odor?- None  ?? ??Edge?- Poorly defined  ?? ??Surrounding Tissue Color?- Erythema  ?? ??Surrounding Tissue Condition?- Excoriated, Friable, Maceration  ?? ??Status?- Improving  ?  Assessment/Plan  1.?Pressure ulcer of right ischium?L89.319  ?Local wound care?modalities as recommended and documented below.  Ordered:  Wound Care Outpatient, *Est. 12/01/20 3:00:00 CST, *Est. Stop date 12/01/20 3:00:00 CST, [facility], Return to Clinic 2 weeks  Wound Care Team Treatment, 11/17/20 13:35:00 CST, Stop date 11/17/20 13:35:00 CST, Collagen matrix dressing to right buttock wounds and change daily to every other day depending on the amount of drainage. Use baby diaper or Tampax pad to assist with moisture absorption.  ?  2.?DM (diabetes mellitus)?E11.9  ?Continue current therapy.  Ordered:  Wound Care Outpatient, *Est. 12/01/20 3:00:00 CST, *Est. Stop date 12/01/20 3:00:00 CST, [facility], Return to Clinic 2 weeks  Wound Care Team Treatment, 11/17/20 13:35:00 CST, Stop date 11/17/20 13:35:00 CST, Collagen matrix dressing to right buttock wounds and change daily to every other day depending on the amount of drainage. Use baby diaper or Tampax pad to assist with moisture absorption.  ?   Problem List/Past Medical History  Ongoing  Chronic skin ulcer  DM (diabetes mellitus)  Neurogenic bladder  Obesity  Open wound of abdomen  Pressure ulcer of heel  Pressure ulcer of right ischium  Quadriplegia  Quadriplegic spinal paralysis  Skin eschar  Historical  Pressure ulcer of right foot stage 3  Medications  Bactroban 2% topical ointment, 1 aruna, TOP, TID,? ?Not Taking, Completed Rx  BASAGLAR INJ 100UNIT  Bydureon BCise 2 mg/0.85 mL subcutaneous suspension, extended release,? ?Not Taking per Prescriber  Ciprofloxacin Hcl 500 Mg Tab, 500 mg= 1 tab(s), BID,? ?Not Taking, Completed Rx  clindamycin 150 mg oral capsule, 300 mg= 2 cap(s), Oral, q6hr,? ?Not Taking, Completed Rx  gentamicin 0.1% topical ointment, 1 aruna, TOP, Daily,? ?Not Taking, Completed Rx  gentamicin 0.1% topical ointment, 1 aruna, TOP, Daily, 2 refills  GLIPIZIDE TAB 10MG, 10 mg= 1 tab(s), Oral, BID,? ?Not Taking per Prescriber  JANUVIA TAB 100MG, 100 mg= 1 tab(s), Oral, Daily,? ?Not Taking per Prescriber  Januvia 50 mg oral tablet, 50 mg= 1 tab(s), Oral, Daily  Ketoconazole 2% Shampoo,? ?Not Taking, Completed Rx  Levofloxacin 500 Mg Tablet, 500 mg= 1 tab(s), Oral,  Daily,? ?Not Taking, Completed Rx  MUPIROCIN OIN 2%,? ?Not taking  MYRBETRIQ TAB 50MG, 50 mg= 1 tab(s), Oral, Daily  OXYBUTYNIN TAB 5MG,? ?Not Taking per Prescriber  ReliOn/NovoLIN R 100 units/mL injectable solution  sulfamethoxazole-trimethoprim 800 mg-160 mg oral tablet, 1 tab(s), Oral, BID,? ?Not Taking, Completed Rx  Tramadol Hcl Tab 50 Mg, 50 mg= 1 tab(s), Oral, q4-6hr,? ?Not Taking, Completed Rx  Allergies  No Known Allergies  Social History  Abuse/Neglect  No, 05/19/2020  Tobacco  Never (less than 100 in lifetime), N/A, 05/19/2020  Never smoker, 05/01/2017  Health Maintenance  Health Maintenance  ???Pending?(in the next year)  ??? ??OverDue  ??? ? ? ?Obesity Screening due??01/01/20??and every 1??year(s)  ??? ? ? ?Alcohol Misuse Screening due??01/02/20??and every 1??year(s)  ??? ??Due?  ??? ? ? ?Aspirin Therapy for CVD Prevention due??11/17/20??and every 1??year(s)  ??? ? ? ?Medicare Annual Wellness Exam due??11/17/20??and every 1??year(s)  ??? ? ? ?Tetanus Vaccine due??11/17/20??and every 10??year(s)  ??? ??Due In Future?  ??? ? ? ?ADL Screening not due until??05/19/21??and every 1??year(s)  ???Satisfied?(in the past 1 year)  ??? ??Satisfied?  ??? ? ? ?ADL Screening on??05/19/20.??Satisfied by Jolynn Madrid LPN  ??? ? ? ?Blood Pressure Screening on??11/17/20.??Satisfied by VERONICA Tim Shawndala  ?

## 2022-05-04 NOTE — HISTORICAL [facility]
This is a historical note converted from Becca. Formatting and pictures may have been removed.  Please reference Becca for original formatting and attached multimedia. History of Present Illness  Incision/Wounds  ?1. Heel Right Pressure ulcer?- last charted: 01/04/2022 13:22  ?? ??Assessment Done By?- Wound Care Team  ?? ??Pressure Point?- Bony prominence  ?? ??Dressing Type?- ABD dressing pad, Gauze dressing, Honey, Medicated packing strips, Rolled Gauze, Tape, Tubular bandage  ?? ??Dressing Assessment?- Drainage present, Intact  ?? ??Dressing Activity?- Changed  ?? ??Cleansing?- Cleaned with normal saline  ?? ??Length?- 4.2 cm  ?? ??Width?- 3.1 cm  ?? ??Depth?- 0.1 cm  ?? ??Wound Bed Tissue Type?- Fibrotic, Granulating  ?? ??Pressure Ulcer Stage?- III  ?? ??Exudate Amount?- Moderate  ?? ??Exudate Type?- Serosanguineous  ?? ??Exudate Odor?- None  ?? ??Edge?- Well defined  ?? ??Surrounding Tissue Color?- Normal  ?? ??Surrounding Tissue Condition?- Callus, Intact  ?  ?2. Foot Right Lateral Pressure ulcer?- last charted: 01/04/2022 13:22  ?? ??Assessment Done By?- Wound Care Team  ?? ??Abnormality Pattern?- Flat  ?? ??Abnormality Color?- Brown, Tan  ?? ??Pressure Point?- Bony prominence  ?? ??Dressing Type?- ABD dressing pad, Gauze dressing, Medicated packing strips, Rolled Gauze, Tape  ?? ??Dressing Assessment?- Drainage present, Intact  ?? ??Dressing Activity?- Changed  ?? ??Cleansing?- Commercial cleansing solution  ?? ??Length?- 0.6 cm  ?? ??Width?- 0.2 cm  ?? ??Depth?- 0.1 cm  ?? ??Wound Bed Tissue Type?- Dry, Scab  ?? ??Pressure Ulcer Stage?- II  ?? ??Exudate Amount?- None  ?? ??Exudate Odor?- None  ?? ??Edge?- Well defined  ?? ??Surrounding Tissue Color?- Normal  ?? ??Surrounding Tissue Condition?- Intact  ?? ??Status?- Improving  ?? ??Topical Agent Application?- Ointment  ?  ?3. Ankle Right Anterior Pressure ulcer?- last charted: 01/04/2022 13:22  ?? ??Assessment Done By?- Wound Care Team  ?? ??Abnormality  Pattern?- Palpable  ?? ??Abnormality Color?- Brown, Red  ?? ??Dressing Type?- Gauze dressing, Medicated packing strips, Rolled Gauze, Tape  ?? ??Dressing Assessment?- Drainage present, Intact  ?? ??Dressing Activity?- Changed  ?? ??Cleansing?- Cleaned with normal saline  ?? ??Length?- 0.5 cm  ?? ??Width?- 0.5 cm  ?? ??Depth?- 0.1 cm  ?? ??Wound Bed Tissue Type?- Dry, Necrotic tissue, slough  ?? ??Pressure Ulcer Stage?- II  ?? ??Exudate Amount?- Scant  ?? ??Exudate Type?- Serosanguineous  ?? ??Exudate Odor?- None  ?? ??Edge?- Well defined  ?? ??Surrounding Tissue Color?- Normal  ?? ??Surrounding Tissue Condition?- Edematous  ?? ??Status?- Improving  ?? ??Topical Agent Application?- Ointment  ?  ?4. Toe Left Lateral, Other: L Great Toe Trauma?- last charted: 01/04/2022 13:22  ?? ??Assessment Done By?- Wound Care Team  ?? ??Abnormality Pattern?- Flat  ?? ??Abnormality Color?- Pink, Red  ?? ??Dressing Type?- Gauze dressing, Medicated packing strips, Tape  ?? ??Dressing Assessment?- Drainage present, Intact  ?? ??Dressing Activity?- Changed  ?? ??Cleansing?- Cleaned with normal saline  ?? ??Length?- 0.8 cm  ?? ??Width?- 1.2 cm  ?? ??Depth?- 0.1 cm  ?? ??Wound Bed Tissue Type?- Granulating  ?? ??Exudate Amount?- Small  ?? ??Exudate Type?- Serosanguineous  ?? ??Exudate Odor?- None  ?? ??Edge?- Well defined  ?? ??Surrounding Tissue Color?- Erythema  ?? ??Surrounding Tissue Condition?- Edematous  ?? ??Status?- Improving  ?? ??Topical Agent Application?- Ointment  ?  ?5. Toe Left Lateral, Other: 2nd Toe Trauma?- last charted: 01/04/2022 13:22  ?? ??Assessment Done By?- Wound Care Team  ?? ??Abnormality Pattern?- Flat  ?? ??Abnormality Color?- Red  ?? ??Dressing Type?- Gauze dressing, Medicated packing strips  ?? ??Dressing Assessment?- Drainage present, Intact  ?? ??Dressing Activity?- Changed  ?? ??Cleansing?- Cleaned with normal saline  ?? ??Length?- 0.4 cm  ?? ??Width?- 0.3 cm  ?? ??Depth?- 0.1 cm  ?? ??Wound Bed Tissue  Type?- Granulating  ?? ??Exudate Amount?- Small  ?? ??Exudate Type?- Serosanguineous  ?? ??Exudate Odor?- None  ?? ??Edge?- Well defined  ?? ??Surrounding Tissue Color?- Erythema  ?? ??Surrounding Tissue Condition?- Edematous  ?? ??Status?- Improving  ?? ??Topical Agent Application?- Ointment  Today?right heel right ankle?left great toe and second toe?are all?granulating well.?No evidence of any drainage.?All areas are improving.?All areas were cleaned with normal saline?and?collagen with silver was applied?with cover dressing?to all areas.?Tubigrip E was applied also.?Patient will change dressings every other day with normal saline cleansing?and collagen AG and cover dressings to all areas. Return in 2 weeks.  Physical Exam  Vitals & Measurements  T:?36.7? ?C (Oral)? HR:?95(Peripheral)? RR:?18? BP:?104/67? SpO2:?99%?  ?  Assessment/Plan  1.?Pressure ulcer of right heel, stage 3?L89.613  Ordered:  Wound Care Outpatient, *Est. 01/11/22 3:00:00 CST, *Est. Stop date 01/11/22 3:00:00 CST, [facility], FU with Physician in 1 week  Wound Care Team Treatment, 01/04/22 13:44:00 CST, Stop date 01/04/22 13:44:00 CST, Clean wounds every other day with normal saline to right heel right ankle left great and second toe apply collagen AG and cover dressing. Also continue with Tubigrip E for compression. Patient wi...  ?  2.?Lymphedema of leg?I89.0  Ordered:  Wound Care Outpatient, *Est. 01/11/22 3:00:00 CST, *Est. Stop date 01/11/22 3:00:00 CST, [facility], GINO with Physician in 1 week  Wound Care Team Treatment, 01/04/22 13:44:00 CST, Stop date 01/04/22 13:44:00 CST, Clean wounds every other day with normal saline to right heel right ankle left great and second toe apply collagen AG and cover dressing. Also continue with Tubigrip E for compression. Patient wi...  ?  3.?Quadriplegia?G82.50  Ordered:  Wound Care Outpatient, *Est. 01/11/22 3:00:00 CST, *Est. Stop date 01/11/22 3:00:00 CST, [facility], FU  with Physician in 1 week  Wound Care Team Treatment, 01/04/22 13:44:00 CST, Stop date 01/04/22 13:44:00 CST, Clean wounds every other day with normal saline to right heel right ankle left great and second toe apply collagen AG and cover dressing. Also continue with Tubigrip E for compression. Patient wi...  ?  4.?Toe ulcer?L97.509  Ordered:  Wound Care Outpatient, *Est. 01/11/22 3:00:00 CST, *Est. Stop date 01/11/22 3:00:00 CST, [facility], FU with Physician in 1 week  Wound Care Team Treatment, 01/04/22 13:44:00 CST, Stop date 01/04/22 13:44:00 CST, Clean wounds every other day with normal saline to right heel right ankle left great and second toe apply collagen AG and cover dressing. Also continue with Tubigrip E for compression. Patient wi...  ?  5.?Pressure ulcer with abrasion, blister, partial thickness skin loss involving epidermis and/or dermis?L89.92  Ordered:  Wound Care Outpatient, *Est. 01/11/22 3:00:00 CST, *Est. Stop date 01/11/22 3:00:00 CST, [facility], FU with Physician in 1 week  Wound Care Team Treatment, 01/04/22 13:44:00 CST, Stop date 01/04/22 13:44:00 CST, Clean wounds every other day with normal saline to right heel right ankle left great and second toe apply collagen AG and cover dressing. Also continue with Tubigrip E for compression. Patient wi...  ?   Problem List/Past Medical History  Ongoing  Chronic skin ulcer  DM (diabetes mellitus)  Infected decubitus ulcer  Lymphedema of leg  Neurogenic bladder  Obesity  Open wound of abdomen  Pressure ulcer of heel  Pressure ulcer of right ischium  Quadriplegia  Quadriplegic spinal paralysis  Skin eschar  Historical  Pressure ulcer of right foot stage 3  Medications  Augmentin 500 mg-125 mg oral tablet, 1 tab(s), Oral, TID  Bactroban 2% topical ointment, 1 aruna, TOP, TID,? ?Not Taking, Completed Rx  BASAGLAR INJ 100UNIT  Bydureon BCise 2 mg/0.85 mL subcutaneous suspension, extended release,? ?Not Taking per Prescriber  Ciprofloxacin  Hcl 500 Mg Tab, 500 mg= 1 tab(s), BID,? ?Not Taking, Completed Rx  clindamycin 150 mg oral capsule, 300 mg= 2 cap(s), Oral, q6hr,? ?Not Taking, Completed Rx  Eliquis Starter Pack for Treatment of DVT and PE 5 mg oral tablet, 5 mg= 1 tab(s), Oral, BID  gentamicin 0.1% topical ointment, See Instructions, 1 refills  gentamicin 0.1% topical ointment, 1 aruna, TOP, Daily,? ?Not Taking, Completed Rx  gentamicin 0.1% topical ointment, 1 aruna, TOP, Daily, 2 refills  GLIPIZIDE TAB 10MG, 10 mg= 1 tab(s), Oral, BID,? ?Not Taking per Prescriber  JANUVIA TAB 100MG, 100 mg= 1 tab(s), Oral, Daily,? ?Not Taking per Prescriber  Januvia 50 mg oral tablet, 50 mg= 1 tab(s), Oral, Daily  Ketoconazole 2% Shampoo,? ?Not Taking, Completed Rx  Levofloxacin 500 Mg Tablet, 500 mg= 1 tab(s), Oral, Daily,? ?Not Taking, Completed Rx  metoprolol succinate 50 mg oral tablet, extended release, 50 mg= 1 tab(s), Oral, BID  MUPIROCIN OIN 2%,? ?Not taking  MYRBETRIQ TAB 50MG, 50 mg= 1 tab(s), Oral, Daily  OXYBUTYNIN TAB 5MG,? ?Not Taking per Prescriber  ReliOn/NovoLIN R 100 units/mL injectable solution  sulfamethoxazole-trimethoprim 800 mg-160 mg oral tablet, 1 tab(s), Oral, BID,? ?Not Taking, Completed Rx  Tramadol Hcl Tab 50 Mg, 50 mg= 1 tab(s), Oral, q4-6hr,? ?Not Taking, Completed Rx  Allergies  No Known Allergies  Social History  Abuse/Neglect  No, 05/19/2020  Tobacco  Never (less than 100 in lifetime), N/A, 05/19/2020  Never smoker, 05/01/2017  Health Maintenance  Health Maintenance  ???Pending?(in the next year)  ??? ??OverDue  ??? ? ? ?ADL Screening due??05/19/21??and every 1??year(s)  ??? ??Due?  ??? ? ? ?Obesity Screening due??01/01/22??and every 1??year(s)  ??? ? ? ?Alcohol Misuse Screening due??01/02/22??and every 1??year(s)  ??? ? ? ?Aspirin Therapy for CVD Prevention due??01/04/22??and every 1??year(s)  ??? ? ? ?Medicare Annual Wellness Exam due??01/04/22??and every 1??year(s)  ??? ? ? ?Tetanus Vaccine due??01/04/22??and every  10??year(s)  ???Satisfied?(in the past 1 year)  ??? ??Satisfied?  ??? ? ? ?Blood Pressure Screening on??01/04/22.??Satisfied by Sally MADDOX, Ca Sánchez  ?

## 2022-05-04 NOTE — HISTORICAL [facility]
This is a historical note converted from Becca. Formatting and pictures may have been removed.  Please reference Becca for original formatting and attached multimedia. Chief Complaint  C/O recurring pressure ulcer to heel  Rt. buttock, ?ischial ulcer?  Physical Exam  Vitals & Measurements  T:?37.5? ?C (Oral)? HR:?135(Peripheral)? RR:?18? BP:?144/95? SpO2:?98%?  HT:?172.7?cm? WT:?102?kg?  Assessment/Plan  1.?Quadriplegia?G82.50  ?Incision/Wounds  ?1. Right Other: ischial pressure ulcer?- last charted: 07/14/2020 15:16  ?? ??Assessment Done By?- Wound Care Team  ?? ??Pressure Point?- Bony prominence  ?? ??Dressing Type?- Gauze dressing, Honey, Nonadherent dressing, Tape  ?? ??Dressing Assessment?- Drainage present, Intact  ?? ??Dressing Activity?- Changed  ?? ??Cleansing?- Cleaned with normal saline  ?? ??Length?- 2 cm  ?? ??Width?- 1.8 cm  ?? ??Depth?- 1.0 cm  ?? ??Wound Bed Tissue Type?- Friable, Granulating, Necrotic tissue, eschar, Necrotic tissue, slough, Stringy  ?? ??Percent Necrotic Tissue Eschar?- 60 %  ?? ??Percent Other Tissue?- 40 %  ?? ??Pressure Ulcer Stage?- III  ?? ??Exudate Amount?- Small  ?? ??Exudate Type?- Serosanguineous  ?? ??Exudate Odor?- None  ?? ??Edge?- Well defined  ?? ??Surrounding Tissue Color?- Erythema  ?? ??Surrounding Tissue Condition?- Friable, Intact  ?? ??Topical Agent Application?- Gel  ?  ?2. Buttock Right Inner?- last charted: 07/14/2020 15:16  ?? ??Assessment Done By?- Wound Care Team  ?? ??Pressure Point?- None  ?? ??Dressing Type?- Absorptive  ?? ??Dressing Assessment?- Drainage present, Intact  ?? ??Dressing Activity?- Removed  ?? ??Cleansing?- Cleaned with normal saline  ?? ??Length?- 4 cm  ?? ??Width?- 5 cm  ?? ??Depth?- 0.1 cm  ?? ??Wound Bed Tissue Type?- Epithelialized, Erythema, Friable, Necrotic tissue, eschar, Necrotic tissue, slough  ?? ??Pressure Ulcer Stage?- Unstageable  ?? ??Exudate Amount?- Moderate  ?? ??Exudate Type?- Serosanguineous  ?? ??Exudate Odor?-  None  ?? ??Edge?- Poorly defined  ?? ??Surrounding Tissue Color?- Erythema  ?? ??Surrounding Tissue Condition?- Excoriated, Friable  For follow-up he is been on vacation he has some new areas of?pressure injury?after examining the patient consents were obtained excisional debridement was performed of both lesions that are present 1 of the issue area?reviews a rongeur and curette to remove nonviable skin subcutaneous tissue down to viable subcutaneous tissue with a measurement of 2 x 1.8 x 1.5 the larger more posterior wound is very superficial and was debrided with a curette removing nonviable skin and subcutaneous tissue down to viable subcutaneous tissue with a defect measuring 4 x 5 x 0.3 cm?meta honey and Mesalt dressings were applied all?the been instructed to continue this daily follow-up with me in 1 week  2.?Pressure ulcer of heel?L89.609  3.?Skin eschar?R23.4  4.?Pressure ulcer of right ischium?L89.319  5.?Neurogenic bladder?N31.9  6.?DM (diabetes mellitus)?E11.9  Orders:  Wound Care Outpatient, *Est. 07/21/20 3:00:00 CDT, *Est. Stop date 07/21/20 3:00:00 CDT, [facility], FU with Physician in 1 week  Wound Care Team Treatment, 07/14/20 15:33:00 CDT, Stop date 07/14/20 15:33:00 CDT, mesalt and medihoney to all woudns daily   Problem List/Past Medical History  Ongoing  Chronic skin ulcer  DM (diabetes mellitus)  Neurogenic bladder  Obesity  Open wound of abdomen  Pressure ulcer of heel  Pressure ulcer of right ischium  Quadriplegia  Quadriplegic spinal paralysis  Skin eschar  Historical  Pressure ulcer of right foot stage 3  Medications  Bactroban 2% topical ointment, 1 aruna, TOP, TID,? ?Not Taking, Completed Rx  BASAGLAR INJ 100UNIT  Bydureon BCise 2 mg/0.85 mL subcutaneous suspension, extended release,? ?Not Taking per Prescriber  Ciprofloxacin Hcl 500 Mg Tab, 500 mg= 1 tab(s), BID,? ?Not Taking, Completed Rx  clindamycin 150 mg oral capsule, 300 mg= 2 cap(s), Oral, q6hr,? ?Not Taking, Completed  Rx  GLIPIZIDE TAB 10MG, 10 mg= 1 tab(s), Oral, BID,? ?Not Taking per Prescriber  JANUVIA TAB 100MG, 100 mg= 1 tab(s), Oral, Daily,? ?Not Taking per Prescriber  Januvia 50 mg oral tablet, 50 mg= 1 tab(s), Oral, Daily  Ketoconazole 2% Shampoo,? ?Not Taking, Completed Rx  Levofloxacin 500 Mg Tablet, 500 mg= 1 tab(s), Oral, Daily,? ?Not Taking, Completed Rx  MUPIROCIN OIN 2%,? ?Not taking  MYRBETRIQ TAB 50MG, 50 mg= 1 tab(s), Oral, Daily  OXYBUTYNIN TAB 5MG,? ?Not Taking per Prescriber  ReliOn/NovoLIN R 100 units/mL injectable solution  Tramadol Hcl Tab 50 Mg, 50 mg= 1 tab(s), Oral, q4-6hr,? ?Not Taking, Completed Rx  Allergies  No Known Allergies  Social History  Abuse/Neglect  No, 05/19/2020  Tobacco  Never (less than 100 in lifetime), N/A, 05/19/2020  Never smoker, 05/01/2017  Health Maintenance  Health Maintenance  ???Pending?(in the next year)  ??? ??OverDue  ??? ? ? ?Obesity Screening due??01/01/20??and every 1??year(s)  ??? ??Due?  ??? ? ? ?Aspirin Therapy for CVD Prevention due??07/14/20??and every 1??year(s)  ??? ? ? ?Medicare Annual Wellness Exam due??07/14/20??and every 1??year(s)  ??? ? ? ?Tetanus Vaccine due??07/14/20??and every 10??year(s)  ??? ??Due In Future?  ??? ? ? ?Alcohol Misuse Screening not due until??01/01/21??and every 1??year(s)  ??? ? ? ?ADL Screening not due until??05/19/21??and every 1??year(s)  ???Satisfied?(in the past 1 year)  ??? ??Satisfied?  ??? ? ? ?ADL Screening on??05/19/20.??Satisfied by Jolynn Madrid LPN  ??? ? ? ?Blood Pressure Screening on??07/01/20.??Satisfied by Junior MADDOX, Nguyen Nunn  ?

## 2022-05-04 NOTE — HISTORICAL [facility]
This is a historical note converted from Becca. Formatting and pictures may have been removed.  Please reference Becca for original formatting and attached multimedia. History of Present Illness  See nurses notes  Review of Systems  See nurses notes  Physical Exam  Vitals & Measurements  T:?36.6? ?C (Oral)? HR:?78(Peripheral)? RR:?17? BP:?106/74? SpO2:?97%?  ?  Assessment/Plan  1.?Open wound of abdomen?S31.109A  ? Incision/Wounds  ?1. Abdomen Lower, Other: surgical incision?- last charted: 05/22/2019 11:00  ?? ??Assessment Done By?- Wound Care Team  ?? ??Dressing Type?- Gauze dressing, Medicated packing strips, Tape  ?? ??Dressing Assessment?- Drainage present, Intact  ?? ??Dressing Activity?- Changed  ?? ??Cleansing?- Cleaned with soap and water  ?? ??Length?- 0.4 cm  ?? ??Width?- 0.2 cm  ?? ??Depth?- 2.4 cm  ?? ??Wound Bed Tissue Type?- Granulating  ?? ??Percent Granulated?- 100 %  ?? ??Exudate Amount?- Small  ?? ??Exudate Type?- Serosanguineous  ?? ??Exudate Odor?- None  ?? ??Edge?- Well defined  ?? ??Surrounding Tissue Color?- Normal  ?? ??Surrounding Tissue Condition?- Intact  ?  ?2. Foot Right Plantar Blister?- last charted: 05/22/2019 11:00  ?? ??Assessment Done By?- Wound Care Team  ?? ??Dressing Type?- Foam, Medicated packing strips  ?? ??Dressing Assessment?- Drainage present, Intact  ?? ??Dressing Activity?- Applied  ?? ??Cleansing?- Cleaned with normal saline  ?? ??Length?- 0.7 cm  ?? ??Width?- 3.1 cm  ?? ??Depth?- 0.1 cm  ?? ??Wound Bed Tissue Type?- Granulating  ?? ??Percent Granulated?- 100 %  ?? ??Exudate Amount?- Scant  ?? ??Exudate Type?- Serosanguineous  ?? ??Exudate Odor?- None  ?? ??Edge?- Attached to wound bed  ?? ??Surrounding Tissue Color?- Normal  ?? ??Surrounding Tissue Condition?- Intact  ?? ??Status?- Deteriorating  ?  ?Patient returns for continuing care.? The abdominal wound?shows a decrease in depth.? Will continue using a thin strip of Mesalt as a wick.? Will change that daily.? The  right heel wound has made good progress. ?Wound bed?is clean and granulating well.? Will switch to Lor every 2 days.? Continue to be careful about offloading as well.? Return 1 week.  2.?Pressure ulcer of right heel?L89.619  Orders:  Wound Care Outpatient, *Est. 05/29/19 3:00:00 CDT, *Est. Stop date 05/29/19 3:00:00 CDT, [facility], FU with Physician in 1 week  Wound Care Team Treatment, 05/22/19 11:22:00 CDT, Stop date 05/22/19 11:22:00 CDT, Mesalt to abd q day; Lor to R heel q 2 days   Problem List/Past Medical History  Ongoing  DM (diabetes mellitus)  Open wound of abdomen  Quadriplegia  Quadriplegic spinal paralysis  Historical  No qualifying data  Medications  Bactroban 2% topical ointment, 1 aruna, TOP, TID,? ?Not Taking, Completed Rx  BASAGLAR INJ 100UNIT  Ciprofloxacin Hcl 500 Mg Tab, 500 mg= 1 tab(s), BID,? ?Not Taking, Completed Rx  clindamycin 150 mg oral capsule, 300 mg= 2 cap(s), Oral, q6hr,? ?Not Taking, Completed Rx  GLIPIZIDE TAB 10MG, 10 mg= 1 tab(s), Oral, BID  JANUVIA TAB 100MG, 100 mg= 1 tab(s), Oral, Daily  Ketoconazole 2% Shampoo,? ?Not Taking, Completed Rx  Levofloxacin 500 Mg Tablet, 500 mg= 1 tab(s), Oral, Daily,? ?Not Taking, Completed Rx  MUPIROCIN OIN 2%,? ?Not taking  MYRBETRIQ TAB 50MG, 50 mg= 1 tab(s), Oral, Daily  OXYBUTYNIN TAB 5MG,? ?Not Taking per Prescriber  ReliOn/NovoLIN R 100 units/mL injectable solution  Tramadol Hcl Tab 50 Mg, 50 mg= 1 tab(s), Oral, q4-6hr  Allergies  No Known Allergies  Social History  Tobacco  Never smoker, 05/11/2017  Health Maintenance  Health Maintenance  ???Pending?(in the next year)  ??? ??OverDue  ??? ? ? ?Alcohol Misuse Screening due??01/01/19??and every 1??year(s)  ??? ? ? ?Obesity Screening due??01/01/19??and every 1??year(s)  ??? ??Due?  ??? ? ? ?Aspirin Therapy for CVD Prevention due??05/22/19??and every 1??year(s)  ??? ? ? ?Tetanus Vaccine due??05/22/19??and every 10??year(s)  ??? ??Due In Future?  ??? ? ? ?ADL Screening not due  until??05/30/19??and every 1??year(s)  ???Satisfied?(in the past 1 year)  ??? ??Satisfied?  ??? ? ? ?ADL Screening on??05/30/18.??Satisfied by Jolynn Madrid LPN  ??? ? ? ?Blood Pressure Screening on??05/22/19.??Satisfied by Jolynn Madrid LPN  ?  ?

## 2022-05-04 NOTE — HISTORICAL [facility]
This is a historical note converted from Becca. Formatting and pictures may have been removed.  Please reference Becca for original formatting and attached multimedia. History of Present Illness  see notes. New little ulcer of 5th toe after a bump  Review of Systems  see nurse notes  Physical Exam  Vitals & Measurements  T:?36.8? ?C (Oral)? HR:?128(Peripheral)? RR:?18? BP:?139/97? SpO2:?90%?  ?  wound in R heel looking good. New little ulcer in his 5th toe  Assessment/Plan  1.?Pressure ulcer of right heel, stage 3?L89.613  Ordered:  Wound Care Outpatient, *Est. 10/05/21 3:00:00 CDT, *Est. Stop date 10/05/21 3:00:00 CDT, [facility], Return to Clinic 2 weeks  Wound Care Team Treatment, 09/21/21 11:35:00 CDT, Stop date 09/21/21 11:35:00 CDT, continue same wound care.. Collagen for open wound. Offloading and tubigrip E doublw layer for light compression. RTC in 2 weeks.  ?  2.?Lymphedema of leg?I89.0  Ordered:  Wound Care Outpatient, *Est. 10/05/21 3:00:00 CDT, *Est. Stop date 10/05/21 3:00:00 CDT, [facility], Return to Clinic 2 weeks  Wound Care Team Treatment, 09/21/21 11:35:00 CDT, Stop date 09/21/21 11:35:00 CDT, continue same wound care.. Collagen for open wound. Offloading and tubigrip E doublw layer for light compression. RTC in 2 weeks.  ?  3.?Quadriplegia?G82.50  Ordered:  Wound Care Outpatient, *Est. 10/05/21 3:00:00 CDT, *Est. Stop date 10/05/21 3:00:00 CDT, [facility], Return to Clinic 2 weeks  Wound Care Team Treatment, 09/21/21 11:35:00 CDT, Stop date 09/21/21 11:35:00 CDT, continue same wound care.. Collagen for open wound. Offloading and tubigrip E doublw layer for light compression. RTC in 2 weeks.  ?  4.?Toe ulcer?L97.509  Incision/Wounds  ?1. Heel Right Pressure ulcer?- last charted: 09/21/2021 11:21  ?? ??Assessment Done By?- Wound Care Team  ?? ??Pressure Point?- Bony prominence  ?? ??Dressing Type?- ABD dressing pad, Collagen, Gauze dressing, Rolled Gauze, Silver, Tape,  Tubular bandage  ?? ??Dressing Assessment?- Drainage present, Intact  ?? ??Dressing Activity?- Changed  ?? ??Cleansing?- Cleaned with normal saline  ?? ??Length?- 2.3 cm  ?? ??Width?- 4.4 cm  ?? ??Depth?- 0.1 cm  ?? ??Wound Bed Tissue Type?- Blood Blister, Granulating  ?? ??Pressure Ulcer Stage?- III  ?? ??Exudate Amount?- Moderate  ?? ??Exudate Type?- Serosanguineous  ?? ??Exudate Odor?- None  ?? ??Edge?- Well defined  ?? ??Surrounding Tissue Color?- Normal  ?? ??Surrounding Tissue Condition?- Dry, Intact  ?? ??Status?- Improving  ?  ?2. Toe Right Anterior Trauma?- last charted: 09/21/2021 11:21  ?? ??Assessment Done By?- Wound Care Team  ?? ??Abnormality Color?- Red  ?? ??Rash Distribution?- Localized  ?? ??Pressure Point?- Bony prominence  ?? ??Dressing Type?- Band-Aid, Collagen, Silver  ?? ??Dressing Activity?- Applied  ?? ??Cleansing?- Cleaned with normal saline  ?? ??Length?- 0.7 cm  ?? ??Width?- 0.6 cm  ?? ??Depth?- 0.1 cm  ?? ??Wound Bed Tissue Type?- Granulating  ?? ??Exudate Amount?- Scant  ?? ??Exudate Type?- Serous  ?? ??Exudate Odor?- None  ?? ??Edge?- Well defined  ?? ??Surrounding Tissue Color?- Erythema  ?? ??Surrounding Tissue Condition?- Callus  ?Pt examined and notes review above. Continue same wound czare with collagen to open wound. Pt went to see [facility] yesterday and they found him to have A Flluter and he?is going to do some texts . For his new toe ulcer we will use Collagen RTC in 1 week  ?   Problem List/Past Medical History  Ongoing  Chronic skin ulcer  DM (diabetes mellitus)  Infected decubitus ulcer  Lymphedema of leg  Neurogenic bladder  Obesity  Open wound of abdomen  Pressure ulcer of heel  Pressure ulcer of right ischium  Quadriplegia  Quadriplegic spinal paralysis  Skin eschar  Historical  Pressure ulcer of right foot stage 3  Medications  Bactroban 2% topical ointment, 1 aruna, TOP, TID,? ?Not Taking, Completed Rx  BASAGLAR INJ 100UNIT  Bydureon BCise 2 mg/0.85 mL subcutaneous  suspension, extended release,? ?Not Taking per Prescriber  Ciprofloxacin Hcl 500 Mg Tab, 500 mg= 1 tab(s), BID,? ?Not Taking, Completed Rx  clindamycin 150 mg oral capsule, 300 mg= 2 cap(s), Oral, q6hr,? ?Not Taking, Completed Rx  gentamicin 0.1% topical ointment, 1 aruna, TOP, Daily,? ?Not Taking, Completed Rx  gentamicin 0.1% topical ointment, 1 aruna, TOP, Daily, 2 refills  GLIPIZIDE TAB 10MG, 10 mg= 1 tab(s), Oral, BID,? ?Not Taking per Prescriber  JANUVIA TAB 100MG, 100 mg= 1 tab(s), Oral, Daily,? ?Not Taking per Prescriber  Januvia 50 mg oral tablet, 50 mg= 1 tab(s), Oral, Daily  Ketoconazole 2% Shampoo,? ?Not Taking, Completed Rx  Levofloxacin 500 Mg Tablet, 500 mg= 1 tab(s), Oral, Daily,? ?Not Taking, Completed Rx  MUPIROCIN OIN 2%,? ?Not taking  MYRBETRIQ TAB 50MG, 50 mg= 1 tab(s), Oral, Daily  OXYBUTYNIN TAB 5MG,? ?Not Taking per Prescriber  ReliOn/NovoLIN R 100 units/mL injectable solution  sulfamethoxazole-trimethoprim 800 mg-160 mg oral tablet, 1 tab(s), Oral, BID,? ?Not Taking, Completed Rx  Tramadol Hcl Tab 50 Mg, 50 mg= 1 tab(s), Oral, q4-6hr,? ?Not Taking, Completed Rx  Allergies  No Known Allergies  Social History  Abuse/Neglect  No, 05/19/2020  Tobacco  Never (less than 100 in lifetime), N/A, 05/19/2020  Never smoker, 05/01/2017  Health Maintenance  Health Maintenance  ???Pending?(in the next year)  ??? ??OverDue  ??? ? ? ?Obesity Screening due??01/01/21??and every 1??year(s)  ??? ? ? ?Alcohol Misuse Screening due??01/02/21??and every 1??year(s)  ??? ? ? ?ADL Screening due??05/19/21??and every 1??year(s)  ??? ??Due?  ??? ? ? ?Aspirin Therapy for CVD Prevention due??09/21/21??and every 1??year(s)  ??? ? ? ?Medicare Annual Wellness Exam due??09/21/21??and every 1??year(s)  ??? ? ? ?Tetanus Vaccine due??09/21/21??and every 10??year(s)  ???Satisfied?(in the past 1 year)  ??? ??Satisfied?  ??? ? ? ?Blood Pressure Screening on??09/21/21.??Satisfied by Sally MADDOX, Ca Sánchez  ?      RTC in 1 week pt is  [place]poing to [facility] in 2 [facility].

## 2022-05-04 NOTE — HISTORICAL [facility]
This is a historical note converted from Becca. Formatting and pictures may have been removed.  Please reference Becca for original formatting and attached multimedia. Chief Complaint  C/O recurring pressure ulcer to heel  Rt. buttock, ?ischial ulcer?  History of Present Illness  See nurses notes  Review of Systems  See nurses notes  Physical Exam  Vitals & Measurements  T:?36.7? ?C (Oral)? HR:?134(Peripheral)? RR:?18? BP:?86/60? SpO2:?99%?  HT:?172.7?cm? WT:?102?kg?  Assessment/Plan  1.?Quadriplegia?G82.50  Incision/Wounds  ?1. Heel Right Pressure ulcer?- last charted: 06/10/2020 11:13  ?? ??Assessment Done By?- Wound Care Team  ?? ??Pressure Point?- Bony prominence  ?? ??Dressing Type?- None  ?? ??Cleansing?- Cleaned with normal saline  ?? ??Length?- 0.5 cm  ?? ??Width?- 0.5 cm  ?? ??Wound Bed Tissue Type?- Scab  ?? ??Percent Other Tissue?- 100 %  ?? ??Exudate Odor?- None  ?? ??Edge?- Attached to wound bed  ?? ??Surrounding Tissue Color?- Normal  ?? ??Surrounding Tissue Condition?- Edematous  ?  ?2. Right Other: ischial pressure ulcer?- last charted: 06/10/2020 11:13  ?? ??Assessment Done By?- Wound Care Team  ?? ??Abnormality Pattern?- Flat  ?? ??Pressure Point?- Bony prominence  ?? ??Dressing Type?- Gauze dressing, Honey, Nonadherent dressing, Tape  ?? ??Dressing Assessment?- Dry, Intact  ?? ??Dressing Activity?- Removed  ?? ??Cleansing?- Cleaned with normal saline  ?? ??Wound Bed Tissue Type?- Friable, Granulating, Necrotic tissue, eschar  ?? ??Pressure Ulcer Stage?- Unstageable  ?? ??Exudate Amount?- Small  ?? ??Exudate Type?- Serosanguineous  ?? ??Exudate Odor?- None  ?? ??Edge?- Attached to wound bed, Poorly defined  ?? ??Surrounding Tissue Color?- Erythema  ?? ??Surrounding Tissue Condition?- Denuded, Friable, Moist  ?? ??Status?- Improving  ?? ??Topical Agent Application?- Ointment  The heel wound has?healed.? There is still?an area of black eschar that is?still firmly attached to the ischial?pressure  wound?so we will continue?the daily use of honey.? Wound assessment today?was based upon?a photograph taken by his attendant this morning.? Return in a week.  2.?Pressure ulcer of heel?L89.609  3.?Skin eschar?R23.4  4.?Pressure ulcer of right ischium?L89.319  5.?Neurogenic bladder?N31.9  6.?DM (diabetes mellitus)?E11.9  Orders:  Wound Care Outpatient, *Est. 06/24/20 3:00:00 CDT, *Est. Stop date 06/24/20 3:00:00 CDT, [facility], FU with Physician in 1 week  Wound Care Team Treatment, 06/17/20 11:35:00 CDT, Stop date [facility]17[facility]:35[facility]00 [facility]   Problem List/Past Medical History  Ongoing  Chronic skin ulcer  DM (diabetes mellitus)  Neurogenic bladder  Obesity  Open wound of abdomen  Pressure ulcer of heel  Pressure ulcer of right ischium  Quadriplegia  Quadriplegic spinal paralysis  Skin eschar  Historical  Pressure ulcer of right foot stage 3  Medications  Bactroban 2% topical ointment, 1 aruna, TOP, TID,? ?Not Taking, Completed Rx  BASAGLAR INJ 100UNIT  Bydureon BCise 2 mg/0.85 mL subcutaneous suspension, extended release,? ?Not Taking per Prescriber  Ciprofloxacin Hcl 500 Mg Tab, 500 mg= 1 tab(s), BID,? ?Not Taking, Completed Rx  clindamycin 150 mg oral capsule, 300 mg= 2 cap(s), Oral, q6hr,? ?Not Taking, Completed Rx  GLIPIZIDE TAB 10MG, 10 mg= 1 tab(s), Oral, BID,? ?Not Taking per Prescriber  JANUVIA TAB 100MG, 100 mg= 1 tab(s), Oral, Daily,? ?Not Taking per Prescriber  Januvia 50 mg oral tablet, 50 mg= 1 tab(s), Oral, Daily  Ketoconazole 2% Shampoo,? ?Not Taking, Completed Rx  Levofloxacin 500 Mg Tablet, 500 mg= 1 tab(s), Oral, Daily,? ?Not Taking, Completed Rx  MUPIROCIN OIN 2%,? ?Not taking  MYRBETRIQ TAB 50MG, 50 mg= 1 tab(s), Oral, Daily  OXYBUTYNIN TAB 5MG,? ?Not Taking per Prescriber  ReliOn/NovoLIN R 100 units/mL injectable solution  Tramadol Hcl Tab 50 Mg, 50 mg= 1 tab(s), Oral, q4-6hr,? ?Not Taking, Completed Rx  Allergies  No Known Allergies  Social  History  Abuse/Neglect  No, 05/19/2020  Tobacco  Never (less than 100 in lifetime), N/A, 05/19/2020  Never smoker, 05/01/2017  Health Maintenance  Health Maintenance  ???Pending?(in the next year)  ??? ??OverDue  ??? ? ? ?Alcohol Misuse Screening due??01/01/20??and every 1??year(s)  ??? ? ? ?Obesity Screening due??01/01/20??and every 1??year(s)  ??? ??Due?  ??? ? ? ?Aspirin Therapy for CVD Prevention due??06/17/20??and every 1??year(s)  ??? ? ? ?Medicare Annual Wellness Exam due??06/17/20??and every 1??year(s)  ??? ? ? ?Tetanus Vaccine due??06/17/20??and every 10??year(s)  ??? ??Due In Future?  ??? ? ? ?ADL Screening not due until??05/19/21??and every 1??year(s)  ???Satisfied?(in the past 1 year)  ??? ??Satisfied?  ??? ? ? ?ADL Screening on??05/19/20.??Satisfied by Jolynn Madrid LPN  ??? ? ? ?Blood Pressure Screening on??06/10/20.??Satisfied by Jolynn Madrid LPN  ?

## 2022-05-04 NOTE — HISTORICAL [facility]
This is a historical note converted from Becca. Formatting and pictures may have been removed.  Please reference Becca for original formatting and attached multimedia. History of Present Illness  Patient returns for continued management of multiple pressure ulcers?involving?his feet.? He has been compliant with treatment recommendations and offers no new complaints today.  Review of Systems  Not significantly different than the history of present and past medical illnesses.  Physical Exam  Vitals & Measurements  T:?36.6? ?C (Oral)? HR:?122(Peripheral)? RR:?18? BP:?114/88? SpO2:?100%?  ?  On?examination?the patient has?granulating wounds on both feet.? The?left second toe wound has healed.? There is no evidence of infection.??Light?bleeding is noted over?several?wounds.  ?  Incision/Wounds  ?1. Heel Right Pressure ulcer?- last charted: 01/18/2022 11:22  ?? ??Assessment Done By?- Wound Care Team  ?? ??Pressure Point?- Bony prominence  ?? ??Dressing Type?- ABD dressing pad, Collagen, Gauze dressing, Rolled Gauze, Tape, Tubular bandage  ?? ??Dressing Assessment?- Drainage present, Intact  ?? ??Dressing Activity?- Changed  ?? ??Cleansing?- Cleaned with normal saline  ?? ??Length?- 4.3 cm  ?? ??Width?- 3.0 cm  ?? ??Depth?- 0.1 cm  ?? ??Wound Bed Tissue Type?- Fibrotic, Granulating  ?? ??Percent Granulated?- 100 %  ?? ??Pressure Ulcer Stage?- III  ?? ??Exudate Amount?- Moderate  ?? ??Exudate Type?- Serosanguineous  ?? ??Exudate Odor?- None  ?? ??Edge?- Well defined  ?? ??Surrounding Tissue Color?- Normal  ?? ??Surrounding Tissue Condition?- Callus, Intact  ?? ??Status?- Improving  ?  ?2. Foot Right Lateral Pressure ulcer?- last charted: 01/18/2022 11:22  ?? ??Assessment Done By?- Wound Care Team  ?? ??Abnormality Pattern?- Flat  ?? ??Abnormality Color?- Maroon  ?? ??Pressure Point?- Bony prominence  ?? ??Dressing Type?- None  ?? ??Dressing Assessment?- Drainage present, Intact  ?? ??Dressing Activity?- Changed  ??  ??Cleansing?- Commercial cleansing solution  ?? ??Length?- 0 cm  ?? ??Width?- 0 cm  ?? ??Depth?- 0 cm  ?? ??Wound Bed Tissue Type?- Dry, Epithelialized  ?? ??Pressure Ulcer Stage?- II  ?? ??Exudate Amount?- None  ?? ??Exudate Odor?- None  ?? ??Edge?- Well defined  ?? ??Surrounding Tissue Color?- Normal  ?? ??Surrounding Tissue Condition?- Intact  ?? ??Status?- Healed  ?  ?3. Ankle Right Anterior Pressure ulcer?- last charted: 01/18/2022 11:22  ?? ??Assessment Done By?- Wound Care Team  ?? ??Dressing Type?- Collagen, Gauze dressing, Rolled Gauze, Tape  ?? ??Dressing Assessment?- Drainage present, Intact  ?? ??Dressing Activity?- Changed  ?? ??Cleansing?- Cleaned with normal saline  ?? ??Length?- 0.4 cm  ?? ??Width?- 0.4 cm  ?? ??Depth?- 0.1 cm  ?? ??Wound Bed Tissue Type?- Granulating  ?? ??Pressure Ulcer Stage?- II  ?? ??Exudate Amount?- Scant  ?? ??Exudate Type?- Serosanguineous  ?? ??Exudate Odor?- None  ?? ??Edge?- Well defined  ?? ??Surrounding Tissue Color?- Normal  ?? ??Surrounding Tissue Condition?- Edematous  ?? ??Status?- Improving  ?? ??Topical Agent Application?- Ointment  ?  ?4. Toe Left Lateral, Other: L Great Toe Trauma?- last charted: 01/18/2022 11:22  ?? ??Assessment Done By?- Wound Care Team  ?? ??Abnormality Pattern?- Flat  ?? ??Abnormality Color?- Pink, Red  ?? ??Dressing Type?- Gauze dressing, Medicated packing strips, Tape  ?? ??Dressing Assessment?- Drainage present, Intact  ?? ??Dressing Activity?- Changed  ?? ??Cleansing?- Cleaned with normal saline  ?? ??Length?- 0.5 cm  ?? ??Width?- 0.6 cm  ?? ??Depth?- 0.1 cm  ?? ??Wound Bed Tissue Type?- Granulating  ?? ??Exudate Amount?- Scant  ?? ??Exudate Type?- Serosanguineous  ?? ??Exudate Odor?- None  ?? ??Edge?- Well defined  ?? ??Surrounding Tissue Color?- Normal  ?? ??Surrounding Tissue Condition?- Dry, Edematous  ?? ??Status?- Improving  ?? ??Topical Agent Application?- Ointment  ?  ?5. Toe Left Lateral, Other: 2nd Toe Trauma?- last charted:  01/18/2022 11:22  ?? ??Assessment Done By?- Wound Care Team  ?? ??Abnormality Pattern?- Flat  ?? ??Abnormality Color?- Red  ?? ??Dressing Type?- Gauze dressing, Medicated packing strips  ?? ??Dressing Assessment?- Drainage present, Intact  ?? ??Dressing Activity?- Changed  ?? ??Cleansing?- Cleaned with normal saline  ?? ??Length?- 0.4 cm  ?? ??Width?- 0.3 cm  ?? ??Wound Bed Tissue Type?- Scab  ?? ??Exudate Amount?- None  ?? ??Exudate Odor?- None  ?? ??Edge?- Well defined  ?? ??Surrounding Tissue Color?- Normal  ?? ??Surrounding Tissue Condition?- Intact  ?? ??Status?- Improving  ?? ??Topical Agent Application?- Ointment  ?  Assessment/Plan  1.?Pressure ulcer of right heel, stage 3?L89.613  ?Improving. ?Continue?wound care modalities as recommended and documented below.  Ordered:  Wound Care Outpatient, *Est. 02/01/22 3:00:00 CST, *Est. Stop date 02/01/22 3:00:00 CST, [facility], Return to Clinic 2 weeks  Wound Care Team Treatment, 01/18/22 11:50:00 CST, Stop date 01/18/22 11:50:00 CST, Apply collagen to the open wound beds. Cover with gauze, ABD, Kerlix and secure with tape. Change dressings every other day. Continue offloading efforts and use Tubigrip for compression.  ?  2.?Toe ulcer?L97.509  ?Significantly improved.? Use wound care modalities as recommended and documented below.  Ordered:  Wound Care Outpatient, *Est. 02/01/22 3:00:00 CST, *Est. Stop date 02/01/22 3:00:00 CST, [facility], Return to Clinic 2 weeks  Wound Care Team Treatment, 01/18/22 11:50:00 CST, Stop date 01/18/22 11:50:00 CST, Apply collagen to the open wound beds. Cover with gauze, ABD, Kerlix and secure with tape. Change dressings every other day. Continue offloading efforts and use Tubigrip for compression.  ?  3.?Lymphedema of leg?I89.0  Use Tubigrip compression therapy.  Ordered:  Wound Care Outpatient, *Est. 02/01/22 3:00:00 CST, *Est. Stop date 02/01/22 3:00:00 CST, [facility], Return to Clinic 2 weeks  Wound  Care Team Treatment, 01/18/22 11:50:00 CST, Stop date 01/18/22 11:50:00 CST, Apply collagen to the open wound beds. Cover with gauze, ABD, Kerlix and secure with tape. Change dressings every other day. Continue offloading efforts and use Tubigrip for compression.  ?  4.?Quadriplegia?G82.50  ?Continue?frequent?turning?to assist with offloading!  Ordered:  Wound Care Outpatient, *Est. 02/01/22 3:00:00 CST, *Est. Stop date 02/01/22 3:00:00 CST, [facility], Return to Clinic 2 weeks  Wound Care Team Treatment, 01/18/22 11:50:00 CST, Stop date 01/18/22 11:50:00 CST, Apply collagen to the open wound beds. Cover with gauze, ABD, Kerlix and secure with tape. Change dressings every other day. Continue offloading efforts and use Tubigrip for compression.  ?   Problem List/Past Medical History  Ongoing  Chronic skin ulcer  DM (diabetes mellitus)  Infected decubitus ulcer  Lymphedema of leg  Neurogenic bladder  Obesity  Open wound of abdomen  Pressure ulcer of heel  Pressure ulcer of right ischium  Quadriplegia  Quadriplegic spinal paralysis  Skin eschar  Historical  Pressure ulcer of right foot stage 3  Medications  Augmentin 500 mg-125 mg oral tablet, 1 tab(s), Oral, TID  Bactroban 2% topical ointment, 1 aruna, TOP, TID,? ?Not Taking, Completed Rx  BASAGLAR INJ 100UNIT  Bydureon BCise 2 mg/0.85 mL subcutaneous suspension, extended release,? ?Not Taking per Prescriber  Ciprofloxacin Hcl 500 Mg Tab, 500 mg= 1 tab(s), BID,? ?Not Taking, Completed Rx  clindamycin 150 mg oral capsule, 300 mg= 2 cap(s), Oral, q6hr,? ?Not Taking, Completed Rx  Eliquis Starter Pack for Treatment of DVT and PE 5 mg oral tablet, 5 mg= 1 tab(s), Oral, BID  gentamicin 0.1% topical ointment, 1 aruna, TOP, Daily,? ?Not Taking, Completed Rx  gentamicin 0.1% topical ointment, 1 aruna, TOP, Daily, 2 refills  GLIPIZIDE TAB 10MG, 10 mg= 1 tab(s), Oral, BID,? ?Not Taking per Prescriber  JANUVIA TAB 100MG, 100 mg= 1 tab(s), Oral, Daily,? ?Not Taking per  Prescriber  Januvia 50 mg oral tablet, 50 mg= 1 tab(s), Oral, Daily  Ketoconazole 2% Shampoo,? ?Not Taking, Completed Rx  Levofloxacin 500 Mg Tablet, 500 mg= 1 tab(s), Oral, Daily,? ?Not Taking, Completed Rx  metoprolol succinate 50 mg oral tablet, extended release, 50 mg= 1 tab(s), Oral, BID  MUPIROCIN OIN 2%,? ?Not taking  MYRBETRIQ TAB 50MG, 50 mg= 1 tab(s), Oral, Daily  OXYBUTYNIN TAB 5MG,? ?Not Taking per Prescriber  ReliOn/NovoLIN R 100 units/mL injectable solution  sulfamethoxazole-trimethoprim 800 mg-160 mg oral tablet, 1 tab(s), Oral, BID,? ?Not Taking, Completed Rx  Tramadol Hcl Tab 50 Mg, 50 mg= 1 tab(s), Oral, q4-6hr,? ?Not Taking, Completed Rx  Allergies  No Known Allergies  Social History  Abuse/Neglect  No, 05/19/2020  Tobacco  Never (less than 100 in lifetime), N/A, 05/19/2020  Never smoker, 05/01/2017  Health Maintenance  Health Maintenance  ???Pending?(in the next year)  ??? ??OverDue  ??? ? ? ?ADL Screening due??05/19/21??and every 1??year(s)  ??? ??Due?  ??? ? ? ?Obesity Screening due??01/01/22??and every 1??year(s)  ??? ? ? ?Alcohol Misuse Screening due??01/02/22??and every 1??year(s)  ??? ? ? ?Aspirin Therapy for CVD Prevention due??01/18/22??and every 1??year(s)  ??? ? ? ?Medicare Annual Wellness Exam due??01/18/22??and every 1??year(s)  ??? ? ? ?Tetanus Vaccine due??01/18/22??and every 10??year(s)  ???Satisfied?(in the past 1 year)  ??? ??Satisfied?  ??? ? ? ?Blood Pressure Screening on??01/18/22.??Satisfied by Junior MADDOX, Nguyen Nunn  ?

## 2022-05-04 NOTE — HISTORICAL [facility] CERNER
This is a historical note converted from Becca. Formatting and pictures may have been removed.  Please reference Becca for original formatting and attached multimedia. History of Present Illness  See nurses note  Review of Systems  See nurses note  Physical Exam  Vitals & Measurements  T:?36.8? ?C (Oral)? HR:?71(Peripheral)? RR:?18? BP:?122/81? SpO2:?97%?  ?  Assessment/Plan  1.?Nonhealing surgical wound?T81.89XA  ? Incision/Wounds  ?1. Abdomen Lower, Other: surgical incision?- last charted: 05/15/2019 11:48  ?? ??Assessment Done By?- Wound Care Team  ?? ??Dressing Type?- Gauze dressing, Medicated packing strips, Tape  ?? ??Dressing Assessment?- Drainage present, Intact  ?? ??Dressing Activity?- Changed  ?? ??Cleansing?- Cleaned with soap and water  ?? ??Length?- 0.4 cm  ?? ??Width?- 0.2 cm  ?? ??Depth?- 2.9 cm  ?? ??Wound Bed Tissue Type?- Granulating  ?? ??Percent Granulated?- 100 %  ?? ??Exudate Amount?- Moderate  ?? ??Exudate Type?- Serosanguineous  ?? ??Exudate Odor?- None  ?? ??Edge?- Well defined  ?? ??Surrounding Tissue Color?- Normal  ?? ??Surrounding Tissue Condition?- Intact  ?  ?2. Foot Right Plantar Blister?- last charted: 05/15/2019 11:48  ?? ??Assessment Done By?- Wound Care Team  ?? ??Dressing Type?- Foam, Medicated packing strips  ?? ??Dressing Activity?- Applied  ?? ??Cleansing?- Cleaned with normal saline  ?? ??Length?- 1.0 cm  ?? ??Width?- 4.5 cm  ?? ??Depth?- 0.1 cm  ?? ??Wound Bed Tissue Type?- Granulating  ?? ??Percent Granulated?- 100 %  ?? ??Exudate Amount?- None  ?? ??Exudate Type?- Serous  ?? ??Exudate Odor?- None  ?? ??Edge?- Attached to wound bed  ?? ??Surrounding Tissue Color?- Normal  ?? ??Surrounding Tissue Condition?- Intact  ?? ??Status?- Deteriorating  ?  ?The abdominal wound still?is about?2.9 cm in depth.? It has minimal drainage at this point. ?Would recommend using a Mesalt?wick in the wound?but not putting it all the way to the bottom and just using it is a superficial  wick.  ?  He also has a new development.? There is a shear injury of the right heel. ?It was covered with a blister which was unroofed.? Will use Mesalt daily on this wound.? Return in 1 week.  Orders:  Wound Care Outpatient, *Est. 05/22/19 3:00:00 CDT, *Est. Stop date 05/22/19 3:00:00 CDT, [facility], FU with Physician in 1 week  Wound Care Team Treatment, 05/15/19 12:01:00 CDT, Stop date 05/15/19 12:01:00 CDT, Mesalt right heel q day, Shallow Mesalt wick in abdominal woound q day   Problem List/Past Medical History  Ongoing  DM (diabetes mellitus)  Open wound of abdomen  Quadriplegia  Quadriplegic spinal paralysis  Historical  No qualifying data  Medications  Bactroban 2% topical ointment, 1 aruna, TOP, TID,? ?Not Taking, Completed Rx  BASAGLAR INJ 100UNIT  Ciprofloxacin Hcl 500 Mg Tab, 500 mg= 1 tab(s), BID,? ?Not Taking, Completed Rx  clindamycin 150 mg oral capsule, 300 mg= 2 cap(s), Oral, q6hr,? ?Not Taking, Completed Rx  GLIPIZIDE TAB 10MG, 10 mg= 1 tab(s), Oral, BID  JANUVIA TAB 100MG, 100 mg= 1 tab(s), Oral, Daily  Ketoconazole 2% Shampoo,? ?Not Taking, Completed Rx  Levofloxacin 500 Mg Tablet, 500 mg= 1 tab(s), Oral, Daily,? ?Not Taking, Completed Rx  MUPIROCIN OIN 2%,? ?Not taking  MYRBETRIQ TAB 50MG, 50 mg= 1 tab(s), Oral, Daily  OXYBUTYNIN TAB 5MG,? ?Not Taking per Prescriber  ReliOn/NovoLIN R 100 units/mL injectable solution  Tramadol Hcl Tab 50 Mg, 50 mg= 1 tab(s), Oral, q4-6hr  Allergies  No Known Allergies  Social History  Tobacco  Never smoker, 05/11/2017  Health Maintenance  Health Maintenance  ???Pending?(in the next year)  ??? ??OverDue  ??? ? ? ?Alcohol Misuse Screening due??01/01/19??and every 1??year(s)  ??? ? ? ?Obesity Screening due??01/01/19??and every 1??year(s)  ??? ??Due?  ??? ? ? ?Aspirin Therapy for CVD Prevention due??05/15/19??and every 1??year(s)  ??? ? ? ?Tetanus Vaccine due??05/15/19??and every 10??year(s)  ??? ??Due In Future?  ??? ? ? ?ADL Screening not due  until??05/30/19??and every 1??year(s)  ???Satisfied?(in the past 1 year)  ??? ??Satisfied?  ??? ? ? ?ADL Screening on??05/30/18.??Satisfied by Jolynn Madrid LPN  ??? ? ? ?Blood Pressure Screening on??05/15/19.??Satisfied by Junior MADDOX, Nguyen Nunn  ?  ?

## 2022-05-04 NOTE — HISTORICAL [facility]
This is a historical note converted from Becca. Formatting and pictures may have been removed.  Please reference Becca for original formatting and attached multimedia. Chief Complaint  C/O recurring pressure ulcer to heel  Rt. buttock, ?ischial ulcer?  Physical Exam  Vitals & Measurements  T:?36.8? ?C (Oral)? HR:?130(Peripheral)? RR:?20? BP:?124/89? SpO2:?96%?  HT:?172.7?cm? WT:?102?kg?  Assessment/Plan  1.?Pressure ulcer of right ischium?L89.319  ?Incision/Wounds  ?1. Buttock Right Inner?- last charted: 12/15/2020 13:19  ?? ??Assessment Done By?- Wound Care Team  ?? ??Pressure Point?- Bony prominence  ?? ??Dressing Type?- ABD dressing pad, Gauze dressing, Medicated packing strips, Tape  ?? ??Dressing Assessment?- Drainage present, Intact  ?? ??Dressing Activity?- Changed  ?? ??Cleansing?- Cleaned with normal saline  ?? ??Length?- 4 cm  ?? ??Width?- 4.5 cm  ?? ??Depth?- 0.2 cm  ?? ??Wound Bed Tissue Type?- Dry, Epithelialized, Friable, Granulating, Necrotic tissue, eschar, Pale Pink, Scab  ?? ??Percent Granulated?- 20 %  ?? ??Percent Other Tissue?- 80 %  ?? ??Pressure Ulcer Stage?- IV  ?? ??Exudate Amount?- Scant  ?? ??Exudate Type?- Serous  ?? ??Exudate Odor?- None  ?? ??Edge?- Poorly defined  ?? ??Surrounding Tissue Color?- Normal  ?? ??Surrounding Tissue Condition?- Dry, Excoriated, Friable  ?? ??Status?- Improving  ?  ?2. Heel Right Pressure ulcer?- last charted: 12/15/2020 13:19  ?? ??Assessment Done By?- Wound Care Team  ?? ??Abnormality Pattern?- Clustered  ?? ??Pressure Point?- Bony prominence  ?? ??Dressing Type?- Absorptive  ?? ??Dressing Assessment?- Dry, Intact  ?? ??Dressing Activity?- Changed  ?? ??Cleansing?- Cleaned with normal saline  ?? ??Length?- 9 cm  ?? ??Width?- 3 cm  ?? ??Wound Bed Tissue Type?- Blood Blister, Epithelialized, Necrotic tissue, eschar, Scab  ?? ??Pressure Ulcer Stage?- Suspected deep tissue injury  ?? ??Exudate Amount?- None  ?? ??Exudate Odor?- None  ?? ??Edge?- Well  defined  ?? ??Surrounding Tissue Color?- Erythema  ?? ??Surrounding Tissue Condition?- Intact  ?MEDICAL DIRECTOR REVIEW AND ADDENDUM????????? after review of the chart records and pictures? there is nonviable tissue present in this UNSTAGEABLE PRESSURE ULCER OF THE right heel the patient will require a maintanance debridment dressing protocol therefore MEDIHONEY is an essential therapy for this patient  2.?Pressure ulcer of heel?L89.609  3.?DM (diabetes mellitus)?E11.9   Problem List/Past Medical History  Ongoing  Chronic skin ulcer  DM (diabetes mellitus)  Neurogenic bladder  Obesity  Open wound of abdomen  Pressure ulcer of heel  Pressure ulcer of right ischium  Quadriplegia  Quadriplegic spinal paralysis  Skin eschar  Historical  Pressure ulcer of right foot stage 3  Medications  Bactroban 2% topical ointment, 1 aruna, TOP, TID,? ?Not Taking, Completed Rx  BASAGLAR INJ 100UNIT  Bydureon BCise 2 mg/0.85 mL subcutaneous suspension, extended release,? ?Not Taking per Prescriber  Ciprofloxacin Hcl 500 Mg Tab, 500 mg= 1 tab(s), BID,? ?Not Taking, Completed Rx  clindamycin 150 mg oral capsule, 300 mg= 2 cap(s), Oral, q6hr,? ?Not Taking, Completed Rx  gentamicin 0.1% topical ointment, 1 aruna, TOP, Daily,? ?Not Taking, Completed Rx  gentamicin 0.1% topical ointment, 1 aruna, TOP, Daily, 2 refills  GLIPIZIDE TAB 10MG, 10 mg= 1 tab(s), Oral, BID,? ?Not Taking per Prescriber  JANUVIA TAB 100MG, 100 mg= 1 tab(s), Oral, Daily,? ?Not Taking per Prescriber  Januvia 50 mg oral tablet, 50 mg= 1 tab(s), Oral, Daily  Ketoconazole 2% Shampoo,? ?Not Taking, Completed Rx  Levofloxacin 500 Mg Tablet, 500 mg= 1 tab(s), Oral, Daily,? ?Not Taking, Completed Rx  MUPIROCIN OIN 2%,? ?Not taking  MYRBETRIQ TAB 50MG, 50 mg= 1 tab(s), Oral, Daily  OXYBUTYNIN TAB 5MG,? ?Not Taking per Prescriber  ReliOn/NovoLIN R 100 units/mL injectable solution  sulfamethoxazole-trimethoprim 800 mg-160 mg oral tablet, 1 tab(s), Oral, BID,? ?Not Taking, Completed  Rx  Tramadol Hcl Tab 50 Mg, 50 mg= 1 tab(s), Oral, q4-6hr,? ?Not Taking, Completed Rx  Allergies  No Known Allergies  Social History  Abuse/Neglect  No, 05/19/2020  Tobacco  Never (less than 100 in lifetime), N/A, 05/19/2020  Never smoker, 05/01/2017  Health Maintenance  Health Maintenance  ???Pending?(in the next year)  ??? ??OverDue  ??? ? ? ?Obesity Screening due??01/01/20??and every 1??year(s)  ??? ? ? ?Alcohol Misuse Screening due??01/02/20??and every 1??year(s)  ??? ??Due?  ??? ? ? ?Aspirin Therapy for CVD Prevention due??12/22/20??and every 1??year(s)  ??? ? ? ?Medicare Annual Wellness Exam due??12/22/20??and every 1??year(s)  ??? ? ? ?Tetanus Vaccine due??12/22/20??and every 10??year(s)  ??? ??Due In Future?  ??? ? ? ?ADL Screening not due until??05/19/21??and every 1??year(s)  ???Satisfied?(in the past 1 year)  ??? ??Satisfied?  ??? ? ? ?ADL Screening on??05/19/20.??Satisfied by Jolynn Madrid LPN  ??? ? ? ?Blood Pressure Screening on??12/15/20.??Satisfied by Nguyen Pacheco RN  ?

## 2022-05-04 NOTE — HISTORICAL [facility] CERN[facility]
This is a historical note converted from Becca. Formatting and pictures may have been removed.  Please reference Becca for original formatting and attached multimedia. Physical Exam  Vitals & Measurements  T:?36.6? ?C (Oral)? HR:?89(Peripheral)? RR:?18? BP:?113/82? SpO2:?98%?  ?  Assessment/Plan  1.?Decubitus ulcer of heel, stage 3?L89.603  ?Incision/Wounds  ?1. Abdomen Lower, Other: surgical incision?- last charted: 07/24/2019 11:20  ?? ??Assessment Done By?- Wound Care Team  ?? ??Dressing Type?- Gauze dressing, Tape  ?? ??Dressing Assessment?- Drainage present, Intact  ?? ??Dressing Activity?- Changed  ?? ??Cleansing?- Cleaned with normal saline, Irrigated with normal saline  ?? ??Length?- 0.1 cm  ?? ??Width?- 0.1 cm  ?? ??Depth?- 0.1 cm  ?? ??Wound Bed Tissue Type?- Epithelialized, Pale Pink  ?? ??Percent Granulated?- 95 %  ?? ??Percent Epithelialized?- 5 %  ?? ??Exudate Amount?- Moderate  ?? ??Exudate Type?- Serosanguineous  ?? ??Exudate Odor?- None  ?? ??Edge?- Well defined  ?? ??Surrounding Tissue Color?- Erythema  ?? ??Surrounding Tissue Condition?- Intact  ?  ?2. Foot Right Plantar Blister?- last charted: 07/24/2019 11:20  ?? ??Assessment Done By?- Wound Care Team  ?? ??Pressure Point?- Bony prominence  ?? ??Dressing Type?- Collagen, Foam  ?? ??Dressing Assessment?- Drainage present, Intact  ?? ??Dressing Activity?- Changed  ?? ??Cleansing?- Cleaned with normal saline  ?? ??Length?- 1.8 cm  ?? ??Width?- 4.0 cm  ?? ??Depth?- 0.1 cm  ?? ??Wound Bed Tissue Type?- Granulating, Necrotic tissue, slough, Pale Pink, Scab  ?? ??Percent Granulated?- 80 %  ?? ??Percent Necrotic Tissue Slough?- 20 %  ?? ??Pressure Ulcer Stage?- III  ?? ??Exudate Amount?- Moderate  ?? ??Exudate Type?- Serous  ?? ??Exudate Odor?- None  ?? ??Edge?- Attached to wound bed  ?? ??Surrounding Tissue Color?- Erythema  ?? ??Surrounding Tissue Condition?- Edematous, Intact  ?Addendum to?note.? Measurements are now included.  Orders:  Wound Care  Outpatient, *Est. 07/31/19 11:00:00 CDT, *Est. Stop date 07/31/19 11:00:00 CDT, [facility], FU with Physician in 1 week  Wound Care Team Treatment, 07/24/19 11:44:00 CDT, Stop date 07/24/19 11:44:00 CDT, Collagen with foam dressing q 2 days to R heel, Gauze/tape to abdominal site. Cleanse both with wound cleanser   Problem List/Past Medical History  Ongoing  Chronic skin ulcer  DM (diabetes mellitus)  Open wound of abdomen  Quadriplegia  Quadriplegic spinal paralysis  Historical  No qualifying data  Medications  Bactroban 2% topical ointment, 1 aruna, TOP, TID,? ?Not Taking, Completed Rx  BASAGLAR INJ 100UNIT  Bydureon BCise 2 mg/0.85 mL subcutaneous suspension, extended release  Ciprofloxacin Hcl 500 Mg Tab, 500 mg= 1 tab(s), BID,? ?Not Taking, Completed Rx  clindamycin 150 mg oral capsule, 300 mg= 2 cap(s), Oral, q6hr,? ?Not Taking, Completed Rx  GLIPIZIDE TAB 10MG, 10 mg= 1 tab(s), Oral, BID,? ?Not Taking per Prescriber  JANUVIA TAB 100MG, 100 mg= 1 tab(s), Oral, Daily,? ?Not Taking per Prescriber  Ketoconazole 2% Shampoo,? ?Not Taking, Completed Rx  Levofloxacin 500 Mg Tablet, 500 mg= 1 tab(s), Oral, Daily,? ?Not Taking, Completed Rx  MUPIROCIN OIN 2%,? ?Not taking  MYRBETRIQ TAB 50MG, 50 mg= 1 tab(s), Oral, Daily  OXYBUTYNIN TAB 5MG,? ?Not Taking per Prescriber  ReliOn/NovoLIN R 100 units/mL injectable solution  Tramadol Hcl Tab 50 Mg, 50 mg= 1 tab(s), Oral, q4-6hr  Allergies  No Known Allergies  Social History  Tobacco  Never smoker, 05/01/2017  Health Maintenance  Health Maintenance  ???Pending?(in the next year)  ??? ??OverDue  ??? ? ? ?Alcohol Misuse Screening due??01/01/19??and every 1??year(s)  ??? ? ? ?Obesity Screening due??01/01/19??and every 1??year(s)  ??? ? ? ?ADL Screening due??05/30/19??and every 1??year(s)  ??? ??Due?  ??? ? ? ?Aspirin Therapy for CVD Prevention due??07/25/19??and every 1??year(s)  ??? ? ? ?Tetanus Vaccine due??07/25/19??and every 10??year(s)  ???Satisfied?(in the past 1  year)  ??? ??Satisfied?  ??? ? ? ?Blood Pressure Screening on??07/24/19.??Satisfied by Junior MADDOX, Nguyen Nunn  ?

## 2022-05-04 NOTE — HISTORICAL [facility] CERN[facility]
This is a historical note converted from Becca. Formatting and pictures may have been removed.  Please reference Becca for original formatting and attached multimedia. Chief Complaint  C/O recurring pressure ulcer to heel  Rt. buttock, ?ischial ulcer?  History of Present Illness  see nurse notes  Review of Systems  see nurse notes  Physical Exam  Vitals & Measurements  T:?36.8? ?C (Oral)? HR:?130(Peripheral)? RR:?18? BP:?136/99? SpO2:?98%?  HT:?172.7?cm? WT:?102?kg?  see nurse notes  Assessment/Plan  1.?Pressure ulcer of right ischium?L89.319  Ordered:  Wound Care Outpatient, *Est. 01/12/21 3:00:00 CST, *Est. Stop date 01/12/21 3:00:00 CST, [facility], Return to Clinic 2 weeks  Wound Care Team Treatment, 12/29/20 13:47:00 CST, Stop date 12/29/20 13:47:00 CST, continue medihoney then mepitel for his r heel ulcer, reapply medihoney QOD and leave mepitel in place for a week,cover with foam and absorptive /protecting dressing. Right inner buttock ulcer...  ?  2.?Pressure ulcer of heel?L89.609  ?Incision/Wounds  ?1. Buttock Right Inner?- last charted: 12/29/2020 13:12  ?? ??Assessment Done By?- Wound Care Team  ?? ??Pressure Point?- Bony prominence  ?? ??Dressing Type?- ABD dressing pad, Gauze dressing, Tape  ?? ??Dressing Assessment?- Dry, Intact  ?? ??Dressing Activity?- Changed  ?? ??Cleansing?- Cleaned with normal saline  ?? ??Length?- 4 cm  ?? ??Width?- 3 cm  ?? ??Depth?- 0.1 cm  ?? ??Wound Bed Tissue Type?- Dry, Epithelialized, Scab  ?? ??Percent Epithelialized?- 100 %  ?? ??Pressure Ulcer Stage?- IV  ?? ??Exudate Amount?- None  ?? ??Exudate Odor?- None  ?? ??Edge?- Poorly defined  ?? ??Surrounding Tissue Color?- Normal  ?? ??Surrounding Tissue Condition?- Dry, Excoriated  ?? ??Status?- Improving  ?  ?2. Heel Right Pressure ulcer?- last charted: 12/29/2020 13:12  ?? ??Assessment Done By?- Wound Care Team  ?? ??Pressure Point?- Bony prominence  ?? ??Dressing Type?- Gauze dressing, Honey, Nonadherent  dressing, Tape  ?? ??Dressing Assessment?- Drainage present, Intact  ?? ??Dressing Activity?- Applied  ?? ??Cleansing?- Cleaned with normal saline  ?? ??Length?- 0.7 cm  ?? ??Width?- 1.1 cm  ?? ??Depth?- 0.1 cm  ?? ??Wound Bed Tissue Type?- Granulating  ?? ??Exudate Amount?- Scant  ?? ??Exudate Type?- Serous  ?? ??Exudate Odor?- None  ?? ??Edge?- Well defined  ?? ??Surrounding Tissue Color?- Normal  ?? ??Surrounding Tissue Condition?- Intact  ?? ??Status?- Improving  ?Pt examined and notes review above. Wounds are more improved, removed scabs from periwound area with pickups right buttock inner area. Silver nitrate was use for some bledding spots ?after removing all the dry scabs, plan for the right inner buttock area is to apply aquaphor and cover area with absortive dressing daily. For his right heel apply medihoney then mepitel cover with foam dressing or other absortive/protective dressing, Reapply medihoney every other day, leave mepitel in place for a week? RTC in 2 weeks  Ordered:  Wound Care Outpatient, *Est. 01/12/21 3:00:00 CST, *Est. Stop date 01/12/21 3:00:00 CST, [facility], Return to Clinic 2 weeks  Wound Care Team Treatment, 12/29/20 13:47:00 CST, Stop date 12/29/20 13:47:00 CST, continue medihoney then mepitel for his r heel ulcer, reapply medihoney QOD and leave mepitel in place for a week,cover with foam and absorptive /protecting dressing. Right inner buttock ulcer...  ?  3.?DM (diabetes mellitus)?E11.9  Ordered:  Wound Care Outpatient, *Est. 01/12/21 3:00:00 CST, *Est. Stop date 01/12/21 3:00:00 CST, [facility], Return to Clinic 2 weeks  Wound Care Team Treatment, 12/29/20 13:47:00 CST, Stop date 12/29/20 13:47:00 CST, continue medihoney then mepitel for his r heel ulcer, reapply medihoney QOD and leave mepitel in place for a week,cover with foam and absorptive /protecting dressing. Right inner buttock ulcer...  ?   Problem List/Past Medical History  Ongoing  Chronic skin  ulcer  DM (diabetes mellitus)  Neurogenic bladder  Obesity  Open wound of abdomen  Pressure ulcer of heel  Pressure ulcer of right ischium  Quadriplegia  Quadriplegic spinal paralysis  Skin eschar  Historical  Pressure ulcer of right foot stage 3  Medications  Bactroban 2% topical ointment, 1 aruna, TOP, TID,? ?Not Taking, Completed Rx  BASAGLAR INJ 100UNIT  Bydureon BCise 2 mg/0.85 mL subcutaneous suspension, extended release,? ?Not Taking per Prescriber  Ciprofloxacin Hcl 500 Mg Tab, 500 mg= 1 tab(s), BID,? ?Not Taking, Completed Rx  clindamycin 150 mg oral capsule, 300 mg= 2 cap(s), Oral, q6hr,? ?Not Taking, Completed Rx  gentamicin 0.1% topical ointment, 1 aruna, TOP, Daily,? ?Not Taking, Completed Rx  gentamicin 0.1% topical ointment, 1 aruna, TOP, Daily, 2 refills  GLIPIZIDE TAB 10MG, 10 mg= 1 tab(s), Oral, BID,? ?Not Taking per Prescriber  JANUVIA TAB 100MG, 100 mg= 1 tab(s), Oral, Daily,? ?Not Taking per Prescriber  Januvia 50 mg oral tablet, 50 mg= 1 tab(s), Oral, Daily  Ketoconazole 2% Shampoo,? ?Not Taking, Completed Rx  Levofloxacin 500 Mg Tablet, 500 mg= 1 tab(s), Oral, Daily,? ?Not Taking, Completed Rx  MUPIROCIN OIN 2%,? ?Not taking  MYRBETRIQ TAB 50MG, 50 mg= 1 tab(s), Oral, Daily  OXYBUTYNIN TAB 5MG,? ?Not Taking per Prescriber  ReliOn/NovoLIN R 100 units/mL injectable solution  sulfamethoxazole-trimethoprim 800 mg-160 mg oral tablet, 1 tab(s), Oral, BID,? ?Not Taking, Completed Rx  Tramadol Hcl Tab 50 Mg, 50 mg= 1 tab(s), Oral, q4-6hr,? ?Not Taking, Completed Rx  Allergies  No Known Allergies  Social History  Abuse/Neglect  No, 05/19/2020  Tobacco  Never (less than 100 in lifetime), N/A, 05/19/2020  Never smoker, 05/01/2017  Health Maintenance  Health Maintenance  ???Pending?(in the next year)  ??? ??OverDue  ??? ? ? ?Obesity Screening due??01/01/20??and every 1??year(s)  ??? ? ? ?Alcohol Misuse Screening due??01/02/20??and every 1??year(s)  ??? ??Due?  ??? ? ? ?Aspirin Therapy for CVD Prevention  due??12/29/20??and every 1??year(s)  ??? ? ? ?Medicare Annual Wellness Exam due??12/29/20??and every 1??year(s)  ??? ? ? ?Tetanus Vaccine due??12/29/20??and every 10??year(s)  ??? ??Due In Future?  ??? ? ? ?ADL Screening not due until??05/19/21??and every 1??year(s)  ???Satisfied?(in the past 1 year)  ??? ??Satisfied?  ??? ? ? ?ADL Screening on??05/19/20.??Satisfied by Jolynn Madrid LPN  ??? ? ? ?Blood Pressure Screening on??12/29/20.??Satisfied by Ophelia Casas  ?

## 2022-05-04 NOTE — HISTORICAL [facility] CERNER
This is a historical note converted from Becca. Formatting and pictures may have been removed.  Please reference Becca for original formatting and attached multimedia. History of Present Illness  see notes  Review of Systems  see nurse notes  Physical Exam  Vitals & Measurements  T:?36.6? ?C (Oral)? HR:?129(Peripheral)? RR:?18? BP:?132/97? SpO2:?99%?  ?  see nurse notes  Assessment/Plan  1.?Pressure ulcer of right heel, stage 3?L89.613  Incision/Wounds  ?1. Heel Right Pressure ulcer?- last charted: 08/24/2021 12:18  ?? ??Assessment Done By?- Wound Care Team  ?? ??Pressure Point?- Bony prominence  ?? ??Dressing Type?- ABD dressing pad, Collagen, Gauze dressing, Medicated packing strips, Rolled Gauze, Tape, Tubular bandage  ?? ??Dressing Assessment?- Drainage present, Intact  ?? ??Dressing Activity?- Changed  ?? ??Cleansing?- Cleaned with normal saline  ?? ??Length?- 3.0 cm  ?? ??Width?- 5.0 cm  ?? ??Depth?- 0.1 cm  ?? ??Wound Bed Tissue Type?- Granulating, Scab  ?? ??Pressure Ulcer Stage?- III  ?? ??Exudate Amount?- Moderate  ?? ??Exudate Type?- Serosanguineous  ?? ??Exudate Odor?- None  ?? ??Edge?- Well defined  ?? ??Surrounding Tissue Color?- Normal  ?? ??Surrounding Tissue Condition?- Dry, Intact  ?? ??Status?- Improving  ?Pt examined and notes review above. Continue collagen with silver QOD for right heel wound. Tubigrip E for light compression. RTC in 2 weeks. Pt to see dr levy 9/20. ?  Ordered:  Wound Care Outpatient, *Est. 09/07/21 3:00:00 CDT, *Est. Stop date 09/07/21 3:00:00 CDT, [facility], Return to Clinic 2 weeks  Wound Care Team Treatment, 08/24/21 12:38:00 CDT, Stop date 08/24/21 12:38:00 CDT, continue collagen with silver,gauze,abd wrap foot with kerlix and secure tape. Tubigrip E double layered for light compression. RTC in 2 weeks. Pt to see cardiologist 9/20.  ?  2.?Lymphedema of leg?I89.0  Ordered:  Wound Care Outpatient, *Est. 09/07/21 3:00:00 CDT, *Est. Stop date 09/07/21 3:00:00  CDT, [facility], Return to Clinic 2 weeks  Wound Care Team Treatment, 08/24/21 12:38:00 CDT, Stop date 08/24/21 12:38:00 CDT, continue collagen with silver,gauze,abd wrap foot with kerlix and secure tape. Tubigrip E double layered for light compression. RTC in 2 weeks. Pt to see cardiologist 9/20.  ?  3.?Quadriplegia?G82.50  Ordered:  Wound Care Outpatient, *Est. 09/07/21 3:00:00 CDT, *Est. Stop date 09/07/21 3:00:00 CDT, [facility], Return to Clinic 2 weeks  Wound Care Team Treatment, 08/24/21 12:38:00 CDT, Stop date 08/24/21 12:38:00 CDT, continue collagen with silver,gauze,abd wrap foot with kerlix and secure tape. Tubigrip E double layered for light compression. RTC in 2 weeks. Pt to see cardiologist 9/20.  ?   Problem List/Past Medical History  Ongoing  Chronic skin ulcer  DM (diabetes mellitus)  Infected decubitus ulcer  Lymphedema of leg  Neurogenic bladder  Obesity  Open wound of abdomen  Pressure ulcer of heel  Pressure ulcer of right ischium  Quadriplegia  Quadriplegic spinal paralysis  Skin eschar  Historical  Pressure ulcer of right foot stage 3  Medications  Bactroban 2% topical ointment, 1 aruna, TOP, TID,? ?Not Taking, Completed Rx  BASAGLAR INJ 100UNIT  Bydureon BCise 2 mg/0.85 mL subcutaneous suspension, extended release,? ?Not Taking per Prescriber  Ciprofloxacin Hcl 500 Mg Tab, 500 mg= 1 tab(s), BID,? ?Not Taking, Completed Rx  clindamycin 150 mg oral capsule, 300 mg= 2 cap(s), Oral, q6hr,? ?Not Taking, Completed Rx  gentamicin 0.1% topical ointment, 1 aruna, TOP, Daily,? ?Not Taking, Completed Rx  gentamicin 0.1% topical ointment, 1 aruna, TOP, Daily, 2 refills  GLIPIZIDE TAB 10MG, 10 mg= 1 tab(s), Oral, BID,? ?Not Taking per Prescriber  JANUVIA TAB 100MG, 100 mg= 1 tab(s), Oral, Daily,? ?Not Taking per Prescriber  Januvia 50 mg oral tablet, 50 mg= 1 tab(s), Oral, Daily  Ketoconazole 2% Shampoo,? ?Not Taking, Completed Rx  Levofloxacin 500 Mg Tablet, 500 mg= 1 tab(s), Oral, Daily,?  ?Not Taking, Completed Rx  MUPIROCIN OIN 2%,? ?Not taking  MYRBETRIQ TAB 50MG, 50 mg= 1 tab(s), Oral, Daily  OXYBUTYNIN TAB 5MG,? ?Not Taking per Prescriber  ReliOn/NovoLIN R 100 units/mL injectable solution  sulfamethoxazole-trimethoprim 800 mg-160 mg oral tablet, 1 tab(s), Oral, BID,? ?Not Taking, Completed Rx  Tramadol Hcl Tab 50 Mg, 50 mg= 1 tab(s), Oral, q4-6hr,? ?Not Taking, Completed Rx  Allergies  No Known Allergies  Social History  Abuse/Neglect  No, 05/19/2020  Tobacco  Never (less than 100 in lifetime), N/A, 05/19/2020  Never smoker, 05/01/2017  Health Maintenance  Health Maintenance  ???Pending?(in the next year)  ??? ??OverDue  ??? ? ? ?Obesity Screening due??01/01/21??and every 1??year(s)  ??? ? ? ?Alcohol Misuse Screening due??01/02/21??and every 1??year(s)  ??? ? ? ?ADL Screening due??05/19/21??and every 1??year(s)  ??? ??Due?  ??? ? ? ?Aspirin Therapy for CVD Prevention due??08/24/21??and every 1??year(s)  ??? ? ? ?Medicare Annual Wellness Exam due??08/24/21??and every 1??year(s)  ??? ? ? ?Tetanus Vaccine due??08/24/21??and every 10??year(s)  ???Satisfied?(in the past 1 year)  ??? ??Satisfied?  ??? ? ? ?Blood Pressure Screening on??08/24/21.??Satisfied by Junior MADDOX, Nguyen Nunn  ?

## 2022-05-04 NOTE — HISTORICAL [facility] CERN[facility]
This is a historical note converted from Becca. Formatting and pictures may have been removed.  Please reference Becca for original formatting and attached multimedia. Chief Complaint  C/O recurring pressure ulcer to heel  Rt. buttock, ?ischial ulcer?  History of Present Illness  see nurse notes  Review of Systems  see nurse note  Physical Exam  Vitals & Measurements  T:?36.8? ?C (Oral)? HR:?130(Peripheral)? RR:?18? BP:?113/82? SpO2:?99%?  HT:?172.7?cm? WT:?102?kg?  see nurse note  Assessment/Plan  1.?Pressure ulcer of right ischium?L89.319  2.?Quadriplegia?G82.50  3.?Abscess, earlobe?H60.00  ?Incision/Wounds  ?1. Right Other: ischial pressure ulcer?- last charted: 08/06/2020 12:10  ?? ??Assessment Done By?- Wound Care Team  ?? ??Pressure Point?- Bony prominence  ?? ??Dressing Type?- Gauze dressing, Honey, Medicated packing strips, Tape  ?? ??Dressing Assessment?- Drainage present, Intact  ?? ??Dressing Activity?- Changed  ?? ??Cleansing?- Cleaned with normal saline  ?? ??Length?- 1.5 cm  ?? ??Width?- 1.7 cm  ?? ??Depth?- 1.3 cm  ?? ??Wound Bed Tissue Type?- Friable, Granulating, Necrotic tissue, eschar, Necrotic tissue, slough, Pale Pink, Stringy  ?? ??Percent Granulated?- 100 %  ?? ??Pressure Ulcer Stage?- III  ?? ??Exudate Amount?- Moderate  ?? ??Exudate Type?- Serosanguineous  ?? ??Exudate Odor?- None  ?? ??Edge?- Well defined  ?? ??Surrounding Tissue Color?- Erythema  ?? ??Surrounding Tissue Condition?- Friable, Intact  ?? ??Status?- Improving  ?? ??Topical Agent Application?- Gel  ?  ?2. Buttock Right Inner?- last charted: 08/06/2020 12:10  ?? ??Assessment Done By?- Wound Care Team  ?? ??Abnormality Pattern?- Clustered  ?? ??Pressure Point?- None  ?? ??Dressing Type?- Absorptive, Honey  ?? ??Dressing Assessment?- Drainage present, Intact  ?? ??Dressing Activity?- Removed  ?? ??Cleansing?- Cleaned with normal saline  ?? ??Length?- 4.5 cm  ?? ??Width?- 3.0 cm  ?? ??Depth?- 0.1 cm  ?? ??Wound Bed Tissue Type?-  Epithelialized, Erythema, Friable, Necrotic tissue, eschar, Necrotic tissue, slough  ?? ??Pressure Ulcer Stage?- Unstageable  ?? ??Exudate Amount?- Moderate  ?? ??Exudate Type?- Serosanguineous  ?? ??Exudate Odor?- None  ?? ??Edge?- Poorly defined  ?? ??Surrounding Tissue Color?- Erythema  ?? ??Surrounding Tissue Condition?- Excoriated, Friable  ?? ??Status?- Improving  ?Pt examined and notes review above. Continue medihoney and mesalt for her ulcer QD. Pt have a Learlobe abscess and he was started in Bactrim DSx 7 days. RTC in 2 weeks.  4.?Pressure ulcer of heel?L89.609  5.?Skin eschar?R23.4  6.?Neurogenic bladder?N31.9  7.?DM (diabetes mellitus)?E11.9  Orders:  Wound Care Outpatient, *Est. 08/20/20 3:00:00 CDT, *Est. Stop date 08/20/20 3:00:00 CDT, [facility], Return to Clinic 2 weeks  Wound Care Team Treatment, 08/06/20 12:34:00 CDT, Stop date 08/06/20 12:34:00 CDT, Continue medihoney and mackalt QD. RTC in 2 weeks. Bactrim DS x 7 days was order.   Problem List/Past Medical History  Ongoing  Chronic skin ulcer  DM (diabetes mellitus)  Neurogenic bladder  Obesity  Open wound of abdomen  Pressure ulcer of heel  Pressure ulcer of right ischium  Quadriplegia  Quadriplegic spinal paralysis  Skin eschar  Historical  Pressure ulcer of right foot stage 3  Medications  Bactrim  mg-160 mg oral tablet, 1 tab(s), Oral, BID  Bactroban 2% topical ointment, 1 aruna, TOP, TID,? ?Not Taking, Completed Rx  BASAGLAR INJ 100UNIT  Bydureon BCise 2 mg/0.85 mL subcutaneous suspension, extended release,? ?Not Taking per Prescriber  Ciprofloxacin Hcl 500 Mg Tab, 500 mg= 1 tab(s), BID,? ?Not Taking, Completed Rx  clindamycin 150 mg oral capsule, 300 mg= 2 cap(s), Oral, q6hr,? ?Not Taking, Completed Rx  gentamicin 0.1% topical ointment, 1 aruna, TOP, Daily,? ?Not Taking, Completed Rx  gentamicin 0.1% topical ointment, 1 aruna, TOP, Daily, 2 refills  GLIPIZIDE TAB 10MG, 10 mg= 1 tab(s), Oral, BID,? ?Not Taking per  Prescriber  JANUVIA TAB 100MG, 100 mg= 1 tab(s), Oral, Daily,? ?Not Taking per Prescriber  Januvia 50 mg oral tablet, 50 mg= 1 tab(s), Oral, Daily  Ketoconazole 2% Shampoo,? ?Not Taking, Completed Rx  Levofloxacin 500 Mg Tablet, 500 mg= 1 tab(s), Oral, Daily,? ?Not Taking, Completed Rx  MUPIROCIN OIN 2%,? ?Not taking  MYRBETRIQ TAB 50MG, 50 mg= 1 tab(s), Oral, Daily  OXYBUTYNIN TAB 5MG,? ?Not Taking per Prescriber  ReliOn/NovoLIN R 100 units/mL injectable solution  Tramadol Hcl Tab 50 Mg, 50 mg= 1 tab(s), Oral, q4-6hr,? ?Not Taking, Completed Rx  Allergies  No Known Allergies  Social History  Abuse/Neglect  No, 05/19/2020  Tobacco  Never (less than 100 in lifetime), N/A, 05/19/2020  Never smoker, 05/01/2017  Health Maintenance  Health Maintenance  ???Pending?(in the next year)  ??? ??OverDue  ??? ? ? ?Obesity Screening due??01/01/20??and every 1??year(s)  ??? ? ? ?Alcohol Misuse Screening due??01/02/20??and every 1??year(s)  ??? ??Due?  ??? ? ? ?Aspirin Therapy for CVD Prevention due??08/06/20??and every 1??year(s)  ??? ? ? ?Medicare Annual Wellness Exam due??08/06/20??and every 1??year(s)  ??? ? ? ?Tetanus Vaccine due??08/06/20??and every 10??year(s)  ??? ??Due In Future?  ??? ? ? ?ADL Screening not due until??05/19/21??and every 1??year(s)  ???Satisfied?(in the past 1 year)  ??? ??Satisfied?  ??? ? ? ?ADL Screening on??05/19/20.??Satisfied by Jolynn Madrid LPN  ??? ? ? ?Blood Pressure Screening on??08/06/20.??Satisfied by Jolynn Madrid LPN  ?

## 2022-05-04 NOTE — HISTORICAL [facility] CERNER
This is a historical note converted from Becca. Formatting and pictures may have been removed.  Please reference Becca for original formatting and attached multimedia. Chief Complaint  C/O recurring pressure ulcer to heel  Rt. buttock, ?ischial ulcer?  History of Present Illness  She returns for?continued management of?gluteal?ulcers.? The patients caregiver has been concerned about?an area of bleeding which last occurred 2 days ago.? It is noteworthy that they were concerned about the development of?increase tissue which they thought was?retained?collagen?matrix dressing?in the wound and tried picking it off.  Physical Exam  Vitals & Measurements  T:?36.9? ?C (Oral)? HR:?129(Peripheral)? RR:?18? BP:?106/75? SpO2:?99%?  HT:?172.7?cm? WT:?102?kg?  On examination the patient has new areas of epithelialization characterized by?marked keratin deposit.? There are also a few necrotic changes along the wound periphery. ?There is no evidence of infection.? The base of the wound?has a small amount of slough?interspersed with granulation tissue.? There is no evidence of active bleeding at this time.  ?  Incision/Wounds  ?1. Buttock Right Inner?- last charted: 12/01/2020 13:21  ?? ??Assessment Done By?- Wound Care Team  ?? ??Pressure Point?- Bony prominence  ?? ??Dressing Type?- ABD dressing pad, Gauze dressing, Honey, Medicated packing strips, Tape  ?? ??Dressing Assessment?- Drainage present, Intact  ?? ??Dressing Activity?- Changed  ?? ??Cleansing?- Cleaned with normal saline  ?? ??Length?- 4.9 cm  ?? ??Width?- 2.8 cm  ?? ??Depth?- 0.2 cm  ?? ??Wound Bed Tissue Type?- Epithelialized, Friable, Granulating, Pale Pink, Scab  ?? ??Percent Granulated?- 40 %  ?? ??Percent Other Tissue?- 60 %  ?? ??Pressure Ulcer Stage?- IV  ?? ??Exudate Amount?- Scant  ?? ??Exudate Type?- Serosanguineous  ?? ??Exudate Odor?- None  ?? ??Edge?- Poorly defined  ?? ??Surrounding Tissue Color?- Erythema  ?? ??Surrounding Tissue Condition?-  Excoriated, Friable, Maceration  ?? ??Status?- Improving  ?  Assessment/Plan  1.?Pressure ulcer of right ischium?L89.319  ?Continue current wound care as recommended and documented below.? Turn frequently!  Ordered:  Wound Care Outpatient, *Est. 12/15/20 3:00:00 CST, *Est. Stop date 12/15/20 3:00:00 CST, [facility], Return to Clinic 2 weeks  Wound Care Team Treatment, 12/01/20 13:46:00 CST, Stop date 12/01/20 13:46:00 CST, Apply collagen matrix dressing to wound site and change daily to every other day depending on amount of moisture. Use secondary absorptive dressing.  ?  2.?DM (diabetes mellitus)?E11.9  ?Tight control of diabetes is recommended.  Ordered:  Wound Care Outpatient, *Est. 12/15/20 3:00:00 CST, *Est. Stop date 12/15/20 3:00:00 CST, [facility], Return to Clinic 2 weeks  Wound Care Team Treatment, 12/01/20 13:46:00 CST, Stop date 12/01/20 13:46:00 CST, Apply collagen matrix dressing to wound site and change daily to every other day depending on amount of moisture. Use secondary absorptive dressing.  ?   Problem List/Past Medical History  Ongoing  Chronic skin ulcer  DM (diabetes mellitus)  Neurogenic bladder  Obesity  Open wound of abdomen  Pressure ulcer of heel  Pressure ulcer of right ischium  Quadriplegia  Quadriplegic spinal paralysis  Skin eschar  Historical  Pressure ulcer of right foot stage 3  Medications  Bactroban 2% topical ointment, 1 aruna, TOP, TID,? ?Not Taking, Completed Rx  BASAGLAR INJ 100UNIT  Bydureon BCise 2 mg/0.85 mL subcutaneous suspension, extended release,? ?Not Taking per Prescriber  Ciprofloxacin Hcl 500 Mg Tab, 500 mg= 1 tab(s), BID,? ?Not Taking, Completed Rx  clindamycin 150 mg oral capsule, 300 mg= 2 cap(s), Oral, q6hr,? ?Not Taking, Completed Rx  gentamicin 0.1% topical ointment, 1 aruna, TOP, Daily,? ?Not Taking, Completed Rx  gentamicin 0.1% topical ointment, 1 aruna, TOP, Daily, 2 refills  GLIPIZIDE TAB 10MG, 10 mg= 1 tab(s), Oral, BID,? ?Not Taking per  Prescriber  JANUVIA TAB 100MG, 100 mg= 1 tab(s), Oral, Daily,? ?Not Taking per Prescriber  Januvia 50 mg oral tablet, 50 mg= 1 tab(s), Oral, Daily  Ketoconazole 2% Shampoo,? ?Not Taking, Completed Rx  Levofloxacin 500 Mg Tablet, 500 mg= 1 tab(s), Oral, Daily,? ?Not Taking, Completed Rx  MUPIROCIN OIN 2%,? ?Not taking  MYRBETRIQ TAB 50MG, 50 mg= 1 tab(s), Oral, Daily  OXYBUTYNIN TAB 5MG,? ?Not Taking per Prescriber  ReliOn/NovoLIN R 100 units/mL injectable solution  sulfamethoxazole-trimethoprim 800 mg-160 mg oral tablet, 1 tab(s), Oral, BID,? ?Not Taking, Completed Rx  Tramadol Hcl Tab 50 Mg, 50 mg= 1 tab(s), Oral, q4-6hr,? ?Not Taking, Completed Rx  Allergies  No Known Allergies  Social History  Abuse/Neglect  No, 05/19/2020  Tobacco  Never (less than 100 in lifetime), N/A, 05/19/2020  Never smoker, 05/01/2017  Health Maintenance  Health Maintenance  ???Pending?(in the next year)  ??? ??OverDue  ??? ? ? ?Obesity Screening due??01/01/20??and every 1??year(s)  ??? ? ? ?Alcohol Misuse Screening due??01/02/20??and every 1??year(s)  ??? ??Due?  ??? ? ? ?Aspirin Therapy for CVD Prevention due??12/01/20??and every 1??year(s)  ??? ? ? ?Medicare Annual Wellness Exam due??12/01/20??and every 1??year(s)  ??? ? ? ?Tetanus Vaccine due??12/01/20??and every 10??year(s)  ??? ??Due In Future?  ??? ? ? ?ADL Screening not due until??05/19/21??and every 1??year(s)  ???Satisfied?(in the past 1 year)  ??? ??Satisfied?  ??? ? ? ?ADL Screening on??05/19/20.??Satisfied by Jolynn Madrid LPN  ??? ? ? ?Blood Pressure Screening on??12/01/20.??Satisfied by VERONICA Tim Shawndala  ?

## 2022-05-04 NOTE — HISTORICAL [facility] CERNER
This is a historical note converted from Becca. Formatting and pictures may have been removed.  Please reference Becca for original formatting and attached multimedia. Chief Complaint  nonhealing wound of abdomen  Physical Exam  Vitals & Measurements  T:?36.9? ?C (Oral)? HR:?79(Peripheral)? RR:?18? BP:?98/68? SpO2:?96%?  ?  Assessment/Plan  1.?Open wound of abdomen?S31.109A  Orders:  Wound Care Outpatient, *Est. 05/08/19 3:00:00 CDT, *Est. Stop date 05/08/19 3:00:00 CDT, [facility], FU with Physician in 2 week  Wound Care Team Treatment, 05/01/19 11:42:00 CDT, Stop date 05/01/19 11:42:00 CDT, Mesalt wick in woound q day  The abdominal wound continues to shrink in size and depth.? Will continue using a Mesalt wick in the wound.? This will be changed on a daily basis.? The attendant was instructed not to pack the wound?tightly but just?place a loose wick.? Return in 2 weeks.   Problem List/Past Medical History  Ongoing  DM (diabetes mellitus)  Open wound of abdomen  Quadriplegia  Quadriplegic spinal paralysis  Historical  No qualifying data  Medications  Bactroban 2% topical ointment, 1 aruna, TOP, TID,? ?Not Taking, Completed Rx  BASAGLAR INJ 100UNIT  Ciprofloxacin Hcl 500 Mg Tab, 500 mg= 1 tab(s), BID,? ?Not Taking, Completed Rx  clindamycin 150 mg oral capsule, 300 mg= 2 cap(s), Oral, q6hr,? ?Not Taking, Completed Rx  GLIPIZIDE TAB 10MG, 10 mg= 1 tab(s), Oral, BID  JANUVIA TAB 100MG, 100 mg= 1 tab(s), Oral, Daily  Ketoconazole 2% Shampoo,? ?Not Taking, Completed Rx  Levofloxacin 500 Mg Tablet, 500 mg= 1 tab(s), Oral, Daily,? ?Not Taking, Completed Rx  MUPIROCIN OIN 2%,? ?Not taking  MYRBETRIQ TAB 50MG, 50 mg= 1 tab(s), Oral, Daily  OXYBUTYNIN TAB 5MG,? ?Not Taking per Prescriber  ReliOn/NovoLIN R 100 units/mL injectable solution  Tramadol Hcl Tab 50 Mg, 50 mg= 1 tab(s), Oral, q4-6hr  Allergies  No Known Allergies  Social History  Tobacco  Never smoker, 05/11/2017  [facility]  Maintenance  ???Pending?(in the next year)  ??? ??OverDue  ??? ? ? ?Alcohol Misuse Screening due??01/01/19??and every 1??year(s)  ??? ? ? ?Obesity Screening due??01/01/19??and every 1??year(s)  ??? ??Due?  ??? ? ? ?Aspirin Therapy for CVD Prevention due??05/01/19??and every 1??year(s)  ??? ? ? ?Tetanus Vaccine due??05/01/19??and every 10??year(s)  ??? ??Due In Future?  ??? ? ? ?ADL Screening not due until??05/30/19??and every 1??year(s)  ???Satisfied?(in the past 1 year)  ??? ??Satisfied?  ??? ? ? ?ADL Screening on??05/30/18.??Satisfied by Jolynn Madrid LPN  ??? ? ? ?Blood Pressure Screening on??05/01/19.??Satisfied by Nguyen Pacheco RN  ?  ?

## 2022-05-04 NOTE — HISTORICAL [facility] CERNER
This is a historical note converted from Becca. Formatting and pictures may have been removed.  Please reference Becca for original formatting and attached multimedia. History of Present Illness  R heel wound. Culture was + for Staph.A MS?and enteroccocus faecalis.  Review of Systems  no complains  Physical Exam  Vitals & Measurements  T:?37.1? ?C (Oral)? HR:?92(Peripheral)? RR:?18? BP:?117/63? SpO2:?99%?  ?  wound still wet/ some necrotic tissue and slough  Assessment/Plan  1.?Pressure ulcer of right heel, stage 3?L89.613  Incision/Wounds  ?1. Heel Right Pressure ulcer?- last charted: 12/07/2021 11:08  ?? ??Assessment Done By?- Wound Care Team  ?? ??Pressure Point?- Bony prominence  ?? ??Dressing Type?- ABD dressing pad, Gauze dressing, Honey, Medicated packing strips, Rolled Gauze, Tape, Tubular bandage  ?? ??Dressing Assessment?- Drainage present, Intact  ?? ??Dressing Activity?- Changed  ?? ??Cleansing?- Cleaned with normal saline  ?? ??Length?- 4.8 cm  ?? ??Width?- 3.0 cm  ?? ??Depth?- 0.1 cm  ?? ??Wound Bed Tissue Type?- Fibrotic, Granulating, Necrotic tissue, slough  ?? ??Percent Granulated?- 80 %  ?? ??Percent Necrotic Tissue Slough?- 20 %  ?? ??Pressure Ulcer Stage?- III  ?? ??Exudate Amount?- Moderate  ?? ??Exudate Type?- Serosanguineous  ?? ??Exudate Odor?- None  ?? ??Edge?- Well defined  ?? ??Surrounding Tissue Color?- Normal  ?? ??Surrounding Tissue Condition?- Callus, Intact  ?  ?2. Foot Right Lateral Pressure ulcer?- last charted: 12/07/2021 11:08  ?? ??Assessment Done By?- Wound Care Team  ?? ??Abnormality Pattern?- Flat  ?? ??Abnormality Color?- Black  ?? ??Pressure Point?- Bony prominence  ?? ??Dressing Type?- ABD dressing pad, Gauze dressing, Honey, Medicated packing strips, Rolled Gauze, Tape  ?? ??Dressing Assessment?- Intact  ?? ??Dressing Activity?- Changed  ?? ??Cleansing?- Commercial cleansing solution  ?? ??Length?- 1.4 cm  ?? ??Width?- 1.1 cm  ?? ??Depth?- 0 cm  ?? ??Wound Bed Tissue  Type?- Dry, Scab  ?? ??Pressure Ulcer Stage?- Suspected deep tissue injury  ?? ??Exudate Amount?- None  ?? ??Exudate Odor?- None  ?? ??Edge?- Well defined  ?? ??Surrounding Tissue Color?- Normal  ?? ??Surrounding Tissue Condition?- Intact  ?? ??Status?- Improving  ?  ?3. Ankle Right Anterior Pressure ulcer?- last charted: 12/07/2021 11:08  ?? ??Assessment Done By?- Wound Care Team  ?? ??Abnormality Pattern?- Palpable  ?? ??Abnormality Color?- Black, Red  ?? ??Dressing Type?- Band-Aid, Honey, Medicated packing strips, Rolled Gauze, Tape  ?? ??Dressing Assessment?- Intact  ?? ??Dressing Activity?- Changed  ?? ??Cleansing?- Cleaned with normal saline  ?? ??Length?- 0.7 cm  ?? ??Width?- 0.5 cm  ?? ??Wound Bed Tissue Type?- Necrotic tissue, eschar  ?? ??Pressure Ulcer Stage?- Unstageable  ?? ??Exudate Amount?- None  ?? ??Exudate Odor?- None  ?? ??Edge?- Well defined  ?? ??Surrounding Tissue Color?- Normal  ?? ??Surrounding Tissue Condition?- Edematous  ?  ?4. Toe Left Lateral, Other: L Great Toe Trauma?- last charted: 12/07/2021 11:00  ?? ??Assessment Done By?- Wound Care Team  ?? ??Abnormality Pattern?- Flat  ?? ??Abnormality Color?- Red  ?? ??Dressing Type?- Band-Aid  ?? ??Dressing Assessment?- Drainage present, Intact  ?? ??Dressing Activity?- Changed  ?? ??Cleansing?- Cleaned with normal saline  ?? ??Length?- 1.9 cm  ?? ??Width?- 2.3 cm  ?? ??Depth?- 0.1 cm  ?? ??Wound Bed Tissue Type?- Fibrotic, Granulating  ?? ??Exudate Amount?- Small  ?? ??Exudate Type?- Serosanguineous  ?? ??Exudate Odor?- None  ?? ??Edge?- Well defined  ?? ??Surrounding Tissue Color?- Normal  ?? ??Surrounding Tissue Condition?- Intact  ?? ??Topical Agent Application?- Ointment  ?  ?5. Toe Left Lateral, Other: 2nd Toe Trauma?- last charted: 12/07/2021 11:00  ?? ??Assessment Done By?- Wound Care Team  ?? ??Abnormality Pattern?- Flat  ?? ??Abnormality Color?- Red  ?? ??Dressing Type?- Band-Aid  ?? ??Dressing Assessment?- Drainage present,  Intact  ?? ??Dressing Activity?- Changed  ?? ??Cleansing?- Cleaned with normal saline  ?? ??Length?- 1.2 cm  ?? ??Width?- 1.1 cm  ?? ??Depth?- 0.1 cm  ?? ??Wound Bed Tissue Type?- Fibrotic, Granulating  ?? ??Exudate Amount?- Small  ?? ??Exudate Type?- Serosanguineous  ?? ??Exudate Odor?- None  ?? ??Edge?- Well defined  ?? ??Surrounding Tissue Color?- Normal  ?? ??Surrounding Tissue Condition?- Intact  ?? ??Topical Agent Application?- Ointment  ?Pt examined and notes review above. Plans is to start gentamycin ointment?for his wound and continue augmentin X10 days. Continue medihoney/mesalt?daily to heel. RTC in 1 week.?Gentamycin oint apply QD.  Ordered:  Wound Care Outpatient, *Est. 12/14/21 3:00:00 CST, *Est. Stop date 12/14/21 3:00:00 CST, [facility], FU with Physician in 1 week  Wound Care Team Treatment, 12/07/21 11:28:00 CST, Stop date 12/07/21 11:28:00 CST, continue medihoney/mesalt to [facility] daily. RTC in 1 week.  ?  2.?Lymphedema of leg?I89.0  Ordered:  Wound Care Outpatient, *Est. 12/14/21 3:00:00 CST, *Est. Stop date 12/14/21 3:00:00 CST, [facility], FU with Physician in 1 week  Wound Care Team Treatment, 12/07/21 11:28:00 CST, Stop date 12/07/21 11:28:00 CST, continue medihoney/mesalt to [facility] daily. RTC in 1 week.  ?  3.?Quadriplegia?G82.50  Ordered:  Wound Care Outpatient, *Est. 12/14/21 3:00:00 CST, *Est. Stop date 12/14/21 3:00:00 CST, [facility], FU with Physician in 1 week  Wound Care Team Treatment, 12/07/21 11:28:00 CST, Stop date 12/07/21 11:28:00 CST, continue medihoney/mesalt to [facility] daily. RTC in 1 week.  ?  4.?Toe ulcer?L97.509  Ordered:  Wound Care Outpatient, *Est. 12/14/21 3:00:00 CST, *Est. Stop date 12/14/21 3:00:00 CST, [facility], FU with Physician in 1 week  Wound Care Team Treatment, 12/07/21 11:28:00 CST, Stop date 12/07/21 11:28:00 CST, continue medihoney/mesalt to [facility] daily. RTC in 1 week.  ?  5.?Pressure ulcer with abrasion, blister, partial  thickness skin loss involving epidermis and/or dermis?L89.92  Ordered:  Wound Care Outpatient, *Est. 12/14/21 3:00:00 CST, *Est. Stop date 12/14/21 3:00:00 CST, [facility], FU with Physician in 1 week  Wound Care Team Treatment, 12/07/21 11:28:00 CST, Stop date 12/07/21 11:28:00 CST, continue medihoney/mesalt to [facility] daily. RTC in 1 week.  ?   Problem List/Past Medical History  Ongoing  Chronic skin ulcer  DM (diabetes mellitus)  Infected decubitus ulcer  Lymphedema of leg  Neurogenic bladder  Obesity  Open wound of abdomen  Pressure ulcer of heel  Pressure ulcer of right ischium  Quadriplegia  Quadriplegic spinal paralysis  Skin eschar  Historical  Pressure ulcer of right foot stage 3  Medications  Augmentin 500 mg-125 mg oral tablet, 1 tab(s), Oral, TID  Bactroban 2% topical ointment, 1 aruna, TOP, TID,? ?Not Taking, Completed Rx  BASAGLAR INJ 100UNIT  Bydureon BCise 2 mg/0.85 mL subcutaneous suspension, extended release,? ?Not Taking per Prescriber  Ciprofloxacin Hcl 500 Mg Tab, 500 mg= 1 tab(s), BID,? ?Not Taking, Completed Rx  clindamycin 150 mg oral capsule, 300 mg= 2 cap(s), Oral, q6hr,? ?Not Taking, Completed Rx  Eliquis Starter Pack for Treatment of DVT and PE 5 mg oral tablet, 5 mg= 1 tab(s), Oral, BID  gentamicin 0.1% topical ointment, 1 aruna, TOP, Daily,? ?Not Taking, Completed Rx  gentamicin 0.1% topical ointment, 1 aruna, TOP, Daily, 2 refills  GLIPIZIDE TAB 10MG, 10 mg= 1 tab(s), Oral, BID,? ?Not Taking per Prescriber  JANUVIA TAB 100MG, 100 mg= 1 tab(s), Oral, Daily,? ?Not Taking per Prescriber  Januvia 50 mg oral tablet, 50 mg= 1 tab(s), Oral, Daily  Ketoconazole 2% Shampoo,? ?Not Taking, Completed Rx  Levofloxacin 500 Mg Tablet, 500 mg= 1 tab(s), Oral, Daily,? ?Not Taking, Completed Rx  metoprolol succinate 50 mg oral tablet, extended release, 50 mg= 1 tab(s), Oral, BID  MUPIROCIN OIN 2%,? ?Not taking  MYRBETRIQ TAB 50MG, 50 mg= 1 tab(s), Oral, Daily  OXYBUTYNIN TAB 5MG,? ?Not Taking per  Prescriber  ReliOn/NovoLIN R 100 units/mL injectable solution  sulfamethoxazole-trimethoprim 800 mg-160 mg oral tablet, 1 tab(s), Oral, BID,? ?Not Taking, Completed Rx  Tramadol Hcl Tab 50 Mg, 50 mg= 1 tab(s), Oral, q4-6hr,? ?Not Taking, Completed Rx  Allergies  No Known Allergies  Social History  Abuse/Neglect  No, 05/19/2020  Tobacco  Never (less than 100 in lifetime), N/A, 05/19/2020  Never smoker, 05/01/2017  Health Maintenance  Health Maintenance  ???Pending?(in the next year)  ??? ??OverDue  ??? ? ? ?Obesity Screening due??01/01/21??and every 1??year(s)  ??? ? ? ?Alcohol Misuse Screening due??01/02/21??and every 1??year(s)  ??? ? ? ?ADL Screening due??05/19/21??and every 1??year(s)  ??? ??Due?  ??? ? ? ?Aspirin Therapy for CVD Prevention due??12/07/21??and every 1??year(s)  ??? ? ? ?Medicare Annual Wellness Exam due??12/07/21??and every 1??year(s)  ??? ? ? ?Tetanus Vaccine due??12/07/21??and every 10??year(s)  ???Satisfied?(in the past 1 year)  ??? ??Satisfied?  ??? ? ? ?Blood Pressure Screening on??12/07/21.??Satisfied by Sally MADDOX, Ca Sánchez  ?

## 2022-05-04 NOTE — HISTORICAL [facility] CERN[facility]
This is a historical note converted from Becca. Formatting and pictures may have been removed.  Please reference Becca for original formatting and attached multimedia. Chief Complaint  C/O recurring pressure ulcer to heel  Rt. buttock, ?ischial ulcer?  History of Present Illness  The patient and his caregiver are pleased with his progress?and response to current therapy. ?They offer no new complaints.  Physical Exam  Vitals & Measurements  T:?36.4? ?C (Oral)? HR:?132(Peripheral)? RR:?18? BP:?142/90? SpO2:?99%?  HT:?172.7?cm? WT:?102?kg?  The patients?right gluteal wounds are?granulating?and there is no current evidence of infection.  ?  Incision/Wounds  ?1. Right Other: ischial pressure ulcer?- last charted: 09/15/2020 11:29  ?? ??Assessment Done By?- Wound Care Team  ?? ??Pressure Point?- Bony prominence  ?? ??Dressing Type?- Absorptive, Collagen, Gauze dressing, Tape  ?? ??Dressing Assessment?- Drainage present, Intact  ?? ??Dressing Activity?- Changed  ?? ??Cleansing?- Cleaned with normal saline  ?? ??Length?- 0.9 cm  ?? ??Width?- 0.6 cm  ?? ??Depth?- 0.3 cm  ?? ??Wound Bed Tissue Type?- Friable, Granulating  ?? ??Percent Granulated?- 100 %  ?? ??Pressure Ulcer Stage?- III  ?? ??Undermining Location?- 12-1 oclock  ?? ??Undermining Depth?- 1.3  ?? ??Exudate Amount?- Small  ?? ??Exudate Type?- Serosanguineous  ?? ??Exudate Odor?- None  ?? ??Edge?- Well defined  ?? ??Surrounding Tissue Color?- Erythema  ?? ??Surrounding Tissue Condition?- Friable, Intact  ?? ??Status?- Improving  ?  ?2. Buttock Right Inner?- last charted: 09/15/2020 11:29  ?? ??Assessment Done By?- Wound Care Team  ?? ??Pressure Point?- None  ?? ??Dressing Type?- Absorptive, Collagen, Tape  ?? ??Dressing Assessment?- Drainage present, Intact  ?? ??Dressing Activity?- Removed  ?? ??Cleansing?- Cleaned with normal saline  ?? ??Length?- 0.9 cm  ?? ??Width?- 0.3 cm  ?? ??Depth?- 0.1 cm  ?? ??Wound Bed Tissue Type?- Epithelialized, Erythema, Friable,  Granulating  ?? ??Pressure Ulcer Stage?- Unstageable  ?? ??Exudate Amount?- Moderate  ?? ??Exudate Type?- Serosanguineous  ?? ??Exudate Odor?- None  ?? ??Edge?- Poorly defined  ?? ??Surrounding Tissue Color?- Erythema  ?? ??Surrounding Tissue Condition?- Excoriated, Friable  ?? ??Status?- Improving  ?  Assessment/Plan  1.?Pressure ulcer of right ischium?L89.319  ?Continue current therapy and offloading maneuvers.  2.?DM (diabetes mellitus)?E11.9  ?Continue current therapy.  Orders:  Wound Care Outpatient, *Est. 09/29/20 3:00:00 CDT, *Est. Stop date 09/29/20 3:00:00 CDT, [facility], Return to Clinic 2 weeks  Wound Care Team Treatment, 09/15/20 13:06:00 CDT, Stop date 09/15/20 13:06:00 CDT, Continue using collagen matrix dressing and change daily to every other day depending on amount of drainage.   Problem List/Past Medical History  Ongoing  Chronic skin ulcer  DM (diabetes mellitus)  Neurogenic bladder  Obesity  Open wound of abdomen  Pressure ulcer of heel  Pressure ulcer of right ischium  Quadriplegia  Quadriplegic spinal paralysis  Skin eschar  Historical  Pressure ulcer of right foot stage 3  Medications  Bactroban 2% topical ointment, 1 aruna, TOP, TID,? ?Not Taking, Completed Rx  BASAGLAR INJ 100UNIT  Bydureon BCise 2 mg/0.85 mL subcutaneous suspension, extended release,? ?Not Taking per Prescriber  Ciprofloxacin Hcl 500 Mg Tab, 500 mg= 1 tab(s), BID,? ?Not Taking, Completed Rx  clindamycin 150 mg oral capsule, 300 mg= 2 cap(s), Oral, q6hr,? ?Not Taking, Completed Rx  gentamicin 0.1% topical ointment, 1 aruna, TOP, Daily,? ?Not Taking, Completed Rx  gentamicin 0.1% topical ointment, 1 aruna, TOP, Daily, 2 refills  GLIPIZIDE TAB 10MG, 10 mg= 1 tab(s), Oral, BID,? ?Not Taking per Prescriber  JANUVIA TAB 100MG, 100 mg= 1 tab(s), Oral, Daily,? ?Not Taking per Prescriber  Januvia 50 mg oral tablet, 50 mg= 1 tab(s), Oral, Daily  Ketoconazole 2% Shampoo,? ?Not Taking, Completed Rx  Levofloxacin 500 Mg Tablet, 500  mg= 1 tab(s), Oral, Daily,? ?Not Taking, Completed Rx  MUPIROCIN OIN 2%,? ?Not taking  MYRBETRIQ TAB 50MG, 50 mg= 1 tab(s), Oral, Daily  OXYBUTYNIN TAB 5MG,? ?Not Taking per Prescriber  ReliOn/NovoLIN R 100 units/mL injectable solution  sulfamethoxazole-trimethoprim 800 mg-160 mg oral tablet, 1 tab(s), Oral, BID,? ?Not Taking, Completed Rx  Tramadol Hcl Tab 50 Mg, 50 mg= 1 tab(s), Oral, q4-6hr,? ?Not Taking, Completed Rx  Allergies  No Known Allergies  Social History  Abuse/Neglect  No, 05/19/2020  Tobacco  Never (less than 100 in lifetime), N/A, 05/19/2020  Never smoker, 05/01/2017  Health Maintenance  Health Maintenance  ???Pending?(in the next year)  ??? ??OverDue  ??? ? ? ?Obesity Screening due??01/01/20??and every 1??year(s)  ??? ? ? ?Alcohol Misuse Screening due??01/02/20??and every 1??year(s)  ??? ??Due?  ??? ? ? ?Aspirin Therapy for CVD Prevention due??09/15/20??and every 1??year(s)  ??? ? ? ?Medicare Annual Wellness Exam due??09/15/20??and every 1??year(s)  ??? ? ? ?Tetanus Vaccine due??09/15/20??and every 10??year(s)  ??? ??Due In Future?  ??? ? ? ?ADL Screening not due until??05/19/21??and every 1??year(s)  ???Satisfied?(in the past 1 year)  ??? ??Satisfied?  ??? ? ? ?ADL Screening on??05/19/20.??Satisfied by Jolynn Madrid LPN  ??? ? ? ?Blood Pressure Screening on??09/15/20.??Satisfied by Jolynn Madrid LPN  ?

## 2022-05-04 NOTE — HISTORICAL [facility] CERNER
This is a historical note converted from Becca. Formatting and pictures may have been removed.  Please reference Becca for original formatting and attached multimedia. Chief Complaint  C/O recurring pressure ulcer to heel  Rt. buttock, ?ischial ulcer?  History of Present Illness  See nurses notes  Review of Systems  See nurses notes  Physical Exam  Vitals & Measurements  T:?36.7? ?C (Oral)? HR:?134(Peripheral)? RR:?18? BP:?86/60? SpO2:?99%?  HT:?172.7?cm? WT:?102?kg?  Assessment/Plan  1.?Quadriplegia?G82.50  Incision/Wounds  ?1. Heel Right Pressure ulcer?- last charted: 06/10/2020 11:13  ?? ??Assessment Done By?- Wound Care Team  ?? ??Pressure Point?- Bony prominence  ?? ??Dressing Type?- None  ?? ??Cleansing?- Cleaned with normal saline  ?? ??Length?- 0.5 cm  ?? ??Width?- 0.5 cm  ?? ??Wound Bed Tissue Type?- Scab  ?? ??Percent Other Tissue?- 100 %  ?? ??Exudate Odor?- None  ?? ??Edge?- Attached to wound bed  ?? ??Surrounding Tissue Color?- Normal  ?? ??Surrounding Tissue Condition?- Edematous  ?  ?2. Right Other: ischial pressure ulcer?- last charted: 06/10/2020 11:13  ?? ??Assessment Done By?- Wound Care Team  ?? ??Abnormality Pattern?- Flat  ?? ??Pressure Point?- Bony prominence  ?? ??Dressing Type?- Gauze dressing  ?? ??Dressing Assessment?- Dry, Intact  ?? ??Dressing Activity?- Removed  ?? ??Cleansing?- Cleaned with normal saline  ?? ??Wound Bed Tissue Type?- Friable, Granulating, Necrotic tissue, eschar  ?? ??Pressure Ulcer Stage?- Unstageable  ?? ??Exudate Amount?- Small  ?? ??Exudate Type?- Serosanguineous  ?? ??Exudate Odor?- None  ?? ??Edge?- Attached to wound bed, Poorly defined  ?? ??Surrounding Tissue Color?- Erythema  ?? ??Surrounding Tissue Condition?- Denuded, Friable, Moist  ?? ??Status?- Improving  ?Patient presents today for?follow-up of an ischemic ulcer on the?ischium.? Due to his quadriplegia, he is difficult to evaluate?each time and person, so he brought a photograph?of the area from this  morning during his daily?hygiene.? Photograph shows that there is some progress.? We will continue using gentamicin ointment on the?medial area and honey on the?black eschar area.? Care will be provided daily at home.? Return in a week.  2.?Pressure ulcer of heel?L89.609  3.?Skin eschar?R23.4  4.?Pressure ulcer of right ischium?L89.319  5.?Neurogenic bladder?N31.9  6.?DM (diabetes mellitus)?E11.9  Orders:  Wound Care Outpatient, *Est. 06/17/20 3:00:00 CDT, *Est. Stop date 06/17/20 3:00:00 CDT, [facility], FU with Physician in 1 week  Wound Care Team Treatment, 06/10/20 11:45:00 CDT, Stop date 06/10/20 11:45:00 CDT, Gentamicin ointment to medial area, Honey to black eschar area. Place a padded dressing over this area. Applied padding to the right heel.   Problem List/Past Medical History  Ongoing  Chronic skin ulcer  DM (diabetes mellitus)  Neurogenic bladder  Obesity  Open wound of abdomen  Pressure ulcer of heel  Pressure ulcer of right ischium  Quadriplegia  Quadriplegic spinal paralysis  Skin eschar  Historical  Pressure ulcer of right foot stage 3  Medications  Bactroban 2% topical ointment, 1 aruna, TOP, TID,? ?Not Taking, Completed Rx  BASAGLAR INJ 100UNIT  Bydureon BCise 2 mg/0.85 mL subcutaneous suspension, extended release,? ?Not Taking per Prescriber  Ciprofloxacin Hcl 500 Mg Tab, 500 mg= 1 tab(s), BID,? ?Not Taking, Completed Rx  clindamycin 150 mg oral capsule, 300 mg= 2 cap(s), Oral, q6hr,? ?Not Taking, Completed Rx  GLIPIZIDE TAB 10MG, 10 mg= 1 tab(s), Oral, BID,? ?Not Taking per Prescriber  JANUVIA TAB 100MG, 100 mg= 1 tab(s), Oral, Daily,? ?Not Taking per Prescriber  Januvia 50 mg oral tablet, 50 mg= 1 tab(s), Oral, Daily  Ketoconazole 2% Shampoo,? ?Not Taking, Completed Rx  Levofloxacin 500 Mg Tablet, 500 mg= 1 tab(s), Oral, Daily,? ?Not Taking, Completed Rx  MUPIROCIN OIN 2%,? ?Not taking  MYRBETRIQ TAB 50MG, 50 mg= 1 tab(s), Oral, Daily  OXYBUTYNIN TAB 5MG,? ?Not Taking per  Prescriber  ReliOn/NovoLIN R 100 units/mL injectable solution  Tramadol Hcl Tab 50 Mg, 50 mg= 1 tab(s), Oral, q4-6hr,? ?Not Taking, Completed Rx  Allergies  No Known Allergies  Social History  Abuse/Neglect  No, 05/19/2020  Tobacco  Never (less than 100 in lifetime), N/A, 05/19/2020  Never smoker, 05/01/2017  Health Maintenance  Health Maintenance  ???Pending?(in the next year)  ??? ??OverDue  ??? ? ? ?Alcohol Misuse Screening due??01/01/20??and every 1??year(s)  ??? ? ? ?Obesity Screening due??01/01/20??and every 1??year(s)  ??? ??Due?  ??? ? ? ?Aspirin Therapy for CVD Prevention due??06/10/20??and every 1??year(s)  ??? ? ? ?Medicare Annual Wellness Exam due??06/10/20??and every 1??year(s)  ??? ? ? ?Tetanus Vaccine due??06/10/20??and every 10??year(s)  ??? ??Due In Future?  ??? ? ? ?ADL Screening not due until??05/19/21??and every 1??year(s)  ???Satisfied?(in the past 1 year)  ??? ??Satisfied?  ??? ? ? ?ADL Screening on??05/19/20.??Satisfied by Jolynn Madrid LPN  ??? ? ? ?Blood Pressure Screening on??06/10/20.??Satisfied by Jolynn Madrid LPN  ?

## 2022-05-04 NOTE — HISTORICAL [facility] CERNER
This is a historical note converted from Becca. Formatting and pictures may have been removed.  Please reference Becca for original formatting and attached multimedia. Chief Complaint  C/O recurring pressure ulcer to heel  Rt. buttock, ?ischial ulcer?  History of Present Illness  Patient returns for?continued management of?right gluteal skin wound and right heel?wound.? They have been using?Aquaphor?to the wound sites and?reports that the wounds have now healed.  Physical Exam  Vitals & Measurements  T:?36.8? ?C (Oral)? HR:?128(Peripheral)? RR:?18? BP:?91/64? SpO2:?96%?  HT:?172.7?cm? WT:?102?kg?  The patient has no open wounds on his skin.  ?  Incision/Wounds  ?1. Buttock Right Inner?- last charted: 02/23/2021 13:11  ?? ??Assessment Done By?- Wound Care Team  ?? ??Pressure Point?- Bony prominence  ?? ??Dressing Type?- None  ?? ??Cleansing?- Cleaned with normal saline  ?? ??Length?- 0 cm  ?? ??Width?- 0 cm  ?? ??Depth?- 0 cm  ?? ??Wound Bed Tissue Type?- Dry, Epithelialized, Friable, Scab  ?? ??Percent Epithelialized?- 100 %  ?? ??Pressure Ulcer Stage?- IV  ?? ??Exudate Amount?- None  ?? ??Exudate Odor?- None  ?? ??Edge?- Approximated  ?? ??Surrounding Tissue Color?- Normal  ?? ??Surrounding Tissue Condition?- Dry, Intact  ?? ??Status?- Healed  ?  ?2. Heel Right Pressure ulcer?- last charted: 02/23/2021 13:11  ?? ??Assessment Done By?- Wound Care Team  ?? ??Pressure Point?- Bony prominence  ?? ??Dressing Type?- ABD dressing pad, Gauze dressing, Tape  ?? ??Dressing Assessment?- Intact  ?? ??Dressing Activity?- Removed  ?? ??Cleansing?- Cleaned with normal saline  ?? ??Length?- 0 cm  ?? ??Width?- 0 cm  ?? ??Depth?- 0 cm  ?? ??Wound Bed Tissue Type?- Dry, Epithelialized, Pale Pink, Scab  ?? ??Exudate Amount?- None  ?? ??Edge?- Approximated  ?? ??Surrounding Tissue Color?- Normal  ?? ??Surrounding Tissue Condition?- Callus, Dry, Intact  ?? ??Status?- Healed  ?  Assessment/Plan  1.?Pressure ulcer of right  ischium?L89.319  ?All of the patients skin wounds have healed.? We will continue program of offloading?and?emollient therapy as?recommended.  Ordered:  Wound Care Team Treatment, 02/23/21 13:31:00 CST, Stop date 02/23/21 13:31:00 CST  ?  2.?Pressure ulcer of heel?L89.609  ?As noted above.  Ordered:  Wound Care Team Treatment, 02/23/21 13:31:00 CST, Stop date 02/23/21 13:31:00 CST  ?   Problem List/Past Medical History  Ongoing  Chronic skin ulcer  DM (diabetes mellitus)  Neurogenic bladder  Obesity  Open wound of abdomen  Pressure ulcer of heel  Pressure ulcer of right ischium  Quadriplegia  Quadriplegic spinal paralysis  Skin eschar  Historical  Pressure ulcer of right foot stage 3  Medications  Bactroban 2% topical ointment, 1 aruna, TOP, TID,? ?Not Taking, Completed Rx  BASAGLAR INJ 100UNIT  Bydureon BCise 2 mg/0.85 mL subcutaneous suspension, extended release,? ?Not Taking per Prescriber  Ciprofloxacin Hcl 500 Mg Tab, 500 mg= 1 tab(s), BID,? ?Not Taking, Completed Rx  clindamycin 150 mg oral capsule, 300 mg= 2 cap(s), Oral, q6hr,? ?Not Taking, Completed Rx  gentamicin 0.1% topical ointment, 1 aruna, TOP, Daily,? ?Not Taking, Completed Rx  gentamicin 0.1% topical ointment, 1 aruna, TOP, Daily, 2 refills  GLIPIZIDE TAB 10MG, 10 mg= 1 tab(s), Oral, BID,? ?Not Taking per Prescriber  JANUVIA TAB 100MG, 100 mg= 1 tab(s), Oral, Daily,? ?Not Taking per Prescriber  Januvia 50 mg oral tablet, 50 mg= 1 tab(s), Oral, Daily  Ketoconazole 2% Shampoo,? ?Not Taking, Completed Rx  Levofloxacin 500 Mg Tablet, 500 mg= 1 tab(s), Oral, Daily,? ?Not Taking, Completed Rx  MUPIROCIN OIN 2%,? ?Not taking  MYRBETRIQ TAB 50MG, 50 mg= 1 tab(s), Oral, Daily  OXYBUTYNIN TAB 5MG,? ?Not Taking per Prescriber  ReliOn/NovoLIN R 100 units/mL injectable solution  sulfamethoxazole-trimethoprim 800 mg-160 mg oral tablet, 1 tab(s), Oral, BID,? ?Not Taking, Completed Rx  Tramadol Hcl Tab 50 Mg, 50 mg= 1 tab(s), Oral, q4-6hr,? ?Not Taking, Completed  Rx  Allergies  No Known Allergies  Social History  Abuse/Neglect  No, 05/19/2020  Tobacco  Never (less than 100 in lifetime), N/A, 05/19/2020  Never smoker, 05/01/2017  Health Maintenance  Health Maintenance  ???Pending?(in the next year)  ??? ??Due?  ??? ? ? ?Obesity Screening due??01/01/21??and every 1??year(s)  ??? ? ? ?Alcohol Misuse Screening due??01/02/21??and every 1??year(s)  ??? ? ? ?Aspirin Therapy for CVD Prevention due??02/23/21??and every 1??year(s)  ??? ? ? ?Medicare Annual Wellness Exam due??02/23/21??and every 1??year(s)  ??? ? ? ?Tetanus Vaccine due??02/23/21??and every 10??year(s)  ??? ??Due In Future?  ??? ? ? ?ADL Screening not due until??05/19/21??and every 1??year(s)  ???Satisfied?(in the past 1 year)  ??? ??Satisfied?  ??? ? ? ?ADL Screening on??05/19/20.??Satisfied by Jolynn Madrid LPN  ??? ? ? ?Blood Pressure Screening on??02/23/21.??Satisfied by VERONICA Tim Shawndala  ?

## 2022-05-04 NOTE — HISTORICAL [facility] CERNER
This is a historical note converted from Becca. Formatting and pictures may have been removed.  Please reference Becca for original formatting and attached multimedia. History of Present Illness  see nurses notes  Review of Systems  see nurses notes  Physical Exam  Vitals & Measurements  T:?36.6? ?C (Oral)? HR:?133(Peripheral)? RR:?18? BP:?108/74? SpO2:?99%?  ?  Assessment/Plan  1.?Pressure injury of heel, stage 3?L89.603  ?Incision/Wounds  ?1. Heel Right Pressure ulcer?- last charted: 02/20/2020 11:12  ?? ??Assessment Done By?- Wound Care Team  ?? ??Pressure Point?- Bony prominence  ?? ??Dressing Type?- Foam, Hydrogel  ?? ??Dressing Assessment?- Drainage present, Intact  ?? ??Dressing Activity?- Changed  ?? ??Cleansing?- Cleaned with normal saline  ?? ??Length?- 0.2 cm  ?? ??Width?- 0.3 cm  ?? ??Depth?- 0.1 cm  ?? ??Wound Bed Tissue Type?- Epithelialized, Pale Pink  ?? ??Percent Granulated?- 5 %  ?? ??Percent Epithelialized?- 95 %  ?? ??Pressure Ulcer Stage?- III  ?? ??Exudate Amount?- Scant  ?? ??Exudate Type?- Serous  ?? ??Exudate Odor?- None  ?? ??Edge?- Attached to wound bed  ?? ??Surrounding Tissue Color?- Normal  ?? ??Surrounding Tissue Condition?- Edematous, Intact  ?? ??Status?- Improving  ?pt examined and review notes above. Wound is looking great. Continue hydrogel to heel ulcer and apply foam and wrap .RTC in 2 weeks  Orders:  Wound Care Outpatient, *Est. 02/27/20 3:00:00 CST, *Est. Stop date 02/27/20 3:00:00 CST, [facility], FU with Physician in 1 week  Wound Care Outpatient, *Est. 03/05/20 3:00:00 CST, *Est. Stop date 03/05/20 3:00:00 CST, [facility], Return to Clinic 2 weeks  Wound Care Team Treatment, 02/20/20 12:00:00 CST, Stop date 02/20/20 12:00:00 CST, continue hydrogel QOD .RTC in 2 weeks. apply foam and wrap.   Problem List/Past Medical History  Ongoing  Chronic skin ulcer  DM (diabetes mellitus)  Open wound of abdomen  Pressure ulcer of right foot stage  3  Quadriplegia  Quadriplegic spinal paralysis  Historical  No qualifying data  Medications  Bactroban 2% topical ointment, 1 aruna, TOP, TID,? ?Not Taking, Completed Rx  BASAGLAR INJ 100UNIT  Bydureon BCise 2 mg/0.85 mL subcutaneous suspension, extended release  Ciprofloxacin Hcl 500 Mg Tab, 500 mg= 1 tab(s), BID,? ?Not Taking, Completed Rx  clindamycin 150 mg oral capsule, 300 mg= 2 cap(s), Oral, q6hr,? ?Not Taking, Completed Rx  GLIPIZIDE TAB 10MG, 10 mg= 1 tab(s), Oral, BID,? ?Not Taking per Prescriber  JANUVIA TAB 100MG, 100 mg= 1 tab(s), Oral, Daily,? ?Not Taking per Prescriber  Ketoconazole 2% Shampoo,? ?Not Taking, Completed Rx  Levofloxacin 500 Mg Tablet, 500 mg= 1 tab(s), Oral, Daily,? ?Not Taking, Completed Rx  MUPIROCIN OIN 2%,? ?Not taking  MYRBETRIQ TAB 50MG, 50 mg= 1 tab(s), Oral, Daily  OXYBUTYNIN TAB 5MG,? ?Not Taking per Prescriber  ReliOn/NovoLIN R 100 units/mL injectable solution  Tramadol Hcl Tab 50 Mg, 50 mg= 1 tab(s), Oral, q4-6hr  Allergies  No Known Allergies  Social History  Tobacco  Never smoker, 05/01/2017  Health Maintenance  Health Maintenance  ???Pending?(in the next year)  ??? ??OverDue  ??? ? ? ?ADL Screening due??05/30/19??and every 1??year(s)  ??? ??Due?  ??? ? ? ?Alcohol Misuse Screening due??01/01/20??and every 1??year(s)  ??? ? ? ?Obesity Screening due??01/01/20??and every 1??year(s)  ??? ? ? ?Aspirin Therapy for CVD Prevention due??02/20/20??and every 1??year(s)  ??? ? ? ?Medicare Annual Wellness Exam due??02/20/20??and every 1??year(s)  ??? ? ? ?Tetanus Vaccine due??02/20/20??and every 10??year(s)  ???Satisfied?(in the past 1 year)  ??? ??Satisfied?  ??? ? ? ?Blood Pressure Screening on??02/20/20.??Satisfied by Jolynn Madrid LPN  ?

## 2022-05-04 NOTE — HISTORICAL [facility] CERNER
This is a historical note converted from Becca. Formatting and pictures may have been removed.  Please reference Becca for original formatting and attached multimedia. Chief Complaint  C/O recurring pressure ulcer to heel  Rt. buttock, ?ischial ulcer?  History of Present Illness  Patient presents today for continued management of her chronic?pressure ulcer on his?right ischium?and follow-up of an ulcer on his right foot.  Review of Systems  Not significantly different than the history of present and past medical illnesses.  Physical Exam  Vitals & Measurements  T:?36.6? ?C (Oral)? HR:?133(Peripheral)? RR:?18? BP:?89/63? SpO2:?98%?  HT:?172.7?cm? WT:?102?kg?  On examination the patients?right ischial?wounds?have epithelialized.? There is evidence of hyper keratinization?and a small amount of fibrous?necrotic tissue is also present.? There is no evidence of infection. ?There are areas of desquamation.? He is?right heel ulcer?has a thin scab. ?There is no evidence of infection.  ?  Incision/Wounds  ?1. Buttock Right Inner?- last charted: 01/26/2021 11:28  ?? ??Assessment Done By?- Wound Care Team  ?? ??Pressure Point?- Bony prominence  ?? ??Dressing Type?- ABD dressing pad, Gauze dressing, Tape  ?? ??Dressing Assessment?- Dry, Intact  ?? ??Dressing Activity?- Changed  ?? ??Cleansing?- Cleaned with normal saline  ?? ??Length?- 3 cm  ?? ??Width?- 5 cm  ?? ??Depth?- 0.1 cm  ?? ??Wound Bed Tissue Type?- Dry, Epithelialized, Friable, Scab  ?? ??Percent Epithelialized?- 100 %  ?? ??Pressure Ulcer Stage?- IV  ?? ??Exudate Amount?- None  ?? ??Exudate Odor?- None  ?? ??Edge?- Poorly defined  ?? ??Surrounding Tissue Color?- Normal  ?? ??Surrounding Tissue Condition?- Dry, Excoriated  ?? ??Status?- Unchanged  ?  ?2. Heel Right Pressure ulcer?- last charted: 01/26/2021 11:28  ?? ??Assessment Done By?- Wound Care Team  ?? ??Pressure Point?- Bony prominence  ?? ??Dressing Type?- Collagen, Gauze dressing, Tape  ?? ??Dressing  Assessment?- Drainage present, Intact  ?? ??Dressing Activity?- Removed  ?? ??Cleansing?- Cleaned with normal saline  ?? ??Length?- 0.3 cm  ?? ??Width?- 0.6 cm  ?? ??Depth?- 0.1 cm  ?? ??Wound Bed Tissue Type?- Granulating, Pale Pink  ?? ??Exudate Amount?- None  ?? ??Exudate Odor?- None  ?? ??Edge?- Well defined  ?? ??Surrounding Tissue Color?- Normal  ?? ??Surrounding Tissue Condition?- Intact  ?  Assessment/Plan  1.?Pressure ulcer of right ischium?L89.319  ?Improved. ?Apply Aquaphor?daily.  Ordered:  Wound Care Outpatient, *Est. 02/09/21 3:00:00 CST, *Est. Stop date 02/09/21 3:00:00 CST, [facility], Return to Clinic 2 weeks  Wound Care Team Treatment, 01/26/21 11:58:00 CST, Stop date 01/26/21 11:58:00 CST, Apply Aquaphor to right ischial wound 2-3 times daily. Offload buttocks frequently. Apply foam dressing to right heel wound and change every other day.  ?  2.?Pressure ulcer of heel?L89.609  ?Apply foam dressing as?advised.  Ordered:  Wound Care Outpatient, *Est. 02/09/21 3:00:00 CST, *Est. Stop date 02/09/21 3:00:00 CST, [facility], Return to Clinic 2 weeks  Wound Care Team Treatment, 01/26/21 11:58:00 CST, Stop date 01/26/21 11:58:00 CST, Apply Aquaphor to right ischial wound 2-3 times daily. Offload buttocks frequently. Apply foam dressing to right heel wound and change every other day.  ?  3.?DM (diabetes mellitus)?E11.9  Ordered:  Wound Care Outpatient, *Est. 02/09/21 3:00:00 CST, *Est. Stop date 02/09/21 3:00:00 CST, [facility], Return to Clinic 2 weeks  Wound Care Team Treatment, 01/26/21 11:58:00 CST, Stop date 01/26/21 11:58:00 CST, Apply Aquaphor to right ischial wound 2-3 times daily. Offload buttocks frequently. Apply foam dressing to right heel wound and change every other day.  ?   Problem List/Past Medical History  Ongoing  Chronic skin ulcer  DM (diabetes mellitus)  Neurogenic bladder  Obesity  Open wound of abdomen  Pressure ulcer of heel  Pressure ulcer of right  ischium  Quadriplegia  Quadriplegic spinal paralysis  Skin eschar  Historical  Pressure ulcer of right foot stage 3  Medications  Bactroban 2% topical ointment, 1 aruna, TOP, TID,? ?Not Taking, Completed Rx  BASAGLAR INJ 100UNIT  Bydureon BCise 2 mg/0.85 mL subcutaneous suspension, extended release,? ?Not Taking per Prescriber  Ciprofloxacin Hcl 500 Mg Tab, 500 mg= 1 tab(s), BID,? ?Not Taking, Completed Rx  clindamycin 150 mg oral capsule, 300 mg= 2 cap(s), Oral, q6hr,? ?Not Taking, Completed Rx  gentamicin 0.1% topical ointment, 1 aruna, TOP, Daily,? ?Not Taking, Completed Rx  gentamicin 0.1% topical ointment, 1 aruna, TOP, Daily, 2 refills  GLIPIZIDE TAB 10MG, 10 mg= 1 tab(s), Oral, BID,? ?Not Taking per Prescriber  JANUVIA TAB 100MG, 100 mg= 1 tab(s), Oral, Daily,? ?Not Taking per Prescriber  Januvia 50 mg oral tablet, 50 mg= 1 tab(s), Oral, Daily  Ketoconazole 2% Shampoo,? ?Not Taking, Completed Rx  Levofloxacin 500 Mg Tablet, 500 mg= 1 tab(s), Oral, Daily,? ?Not Taking, Completed Rx  MUPIROCIN OIN 2%,? ?Not taking  MYRBETRIQ TAB 50MG, 50 mg= 1 tab(s), Oral, Daily  OXYBUTYNIN TAB 5MG,? ?Not Taking per Prescriber  ReliOn/NovoLIN R 100 units/mL injectable solution  sulfamethoxazole-trimethoprim 800 mg-160 mg oral tablet, 1 tab(s), Oral, BID,? ?Not Taking, Completed Rx  Tramadol Hcl Tab 50 Mg, 50 mg= 1 tab(s), Oral, q4-6hr,? ?Not Taking, Completed Rx  Allergies  No Known Allergies  Social History  Abuse/Neglect  No, 05/19/2020  Tobacco  Never (less than 100 in lifetime), N/A, 05/19/2020  Never smoker, 05/01/2017  Health Maintenance  Health Maintenance  ???Pending?(in the next year)  ??? ??Due?  ??? ? ? ?Obesity Screening due??01/01/21??and every 1??year(s)  ??? ? ? ?Alcohol Misuse Screening due??01/02/21??and every 1??year(s)  ??? ? ? ?Aspirin Therapy for CVD Prevention due??01/26/21??and every 1??year(s)  ??? ? ? ?Medicare Annual Wellness Exam due??01/26/21??and every 1??year(s)  ??? ? ? ?Tetanus Vaccine  due??01/26/21??and every 10??year(s)  ??? ??Due In Future?  ??? ? ? ?ADL Screening not due until??05/19/21??and every 1??year(s)  ???Satisfied?(in the past 1 year)  ??? ??Satisfied?  ??? ? ? ?ADL Screening on??05/19/20.??Satisfied by Jolynn Madrid LPN  ??? ? ? ?Blood Pressure Screening on??01/26/21.??Satisfied by Ophelia Casas  ?

## 2022-05-04 NOTE — HISTORICAL [facility] CERNER
This is a historical note converted from Becca. Formatting and pictures may have been removed.  Please reference Becca for original formatting and attached multimedia. History of Present Illness  Incision/Wounds  ?1. Heel Right Pressure ulcer?- last charted: 12/15/2021 13:07  ?? ??Assessment Done By?- Wound Care Team  ?? ??Pressure Point?- Bony prominence  ?? ??Dressing Type?- ABD dressing pad, Gauze dressing, Honey, Medicated packing strips, Rolled Gauze, Tape, Tubular bandage  ?? ??Dressing Assessment?- Drainage present, Intact  ?? ??Dressing Activity?- Changed  ?? ??Cleansing?- Cleaned with normal saline  ?? ??Length?- 4.8 cm  ?? ??Width?- 3.5 cm  ?? ??Depth?- 0.1 cm  ?? ??Wound Bed Tissue Type?- Fibrotic, Granulating, Necrotic tissue, slough  ?? ??Percent Granulated?- 80 %  ?? ??Percent Necrotic Tissue Slough?- 20 %  ?? ??Pressure Ulcer Stage?- III  ?? ??Exudate Amount?- Moderate  ?? ??Exudate Type?- Serosanguineous  ?? ??Exudate Odor?- None  ?? ??Edge?- Well defined  ?? ??Surrounding Tissue Color?- Normal  ?? ??Surrounding Tissue Condition?- Callus, Intact  ?  ?2. Foot Right Lateral Pressure ulcer?- last charted: 12/15/2021 13:07  ?? ??Assessment Done By?- Wound Care Team  ?? ??Abnormality Pattern?- Flat  ?? ??Abnormality Color?- Black  ?? ??Pressure Point?- Bony prominence  ?? ??Dressing Type?- ABD dressing pad, Gauze dressing, Honey, Medicated packing strips, Rolled Gauze, Tape  ?? ??Dressing Assessment?- Intact  ?? ??Dressing Activity?- Changed  ?? ??Cleansing?- Commercial cleansing solution  ?? ??Length?- 1.4 cm  ?? ??Width?- 1.1 cm  ?? ??Depth?- 0 cm  ?? ??Wound Bed Tissue Type?- Dry, Necrotic tissue, eschar  ?? ??Pressure Ulcer Stage?- Suspected deep tissue injury  ?? ??Exudate Amount?- None  ?? ??Exudate Odor?- None  ?? ??Edge?- Well defined  ?? ??Surrounding Tissue Color?- Normal  ?? ??Surrounding Tissue Condition?- Intact  ?? ??Status?- Improving  ?  ?3. Ankle Right Anterior Pressure ulcer?- last  charted: 12/15/2021 13:07  ?? ??Assessment Done By?- Wound Care Team  ?? ??Abnormality Pattern?- Palpable  ?? ??Abnormality Color?- Black, Red  ?? ??Dressing Type?- Band-Aid, Honey, Medicated packing strips, Rolled Gauze, Tape  ?? ??Dressing Assessment?- Intact  ?? ??Dressing Activity?- Changed  ?? ??Cleansing?- Cleaned with normal saline  ?? ??Length?- 0.7 cm  ?? ??Width?- 0.5 cm  ?? ??Wound Bed Tissue Type?- Necrotic tissue, eschar  ?? ??Pressure Ulcer Stage?- Unstageable  ?? ??Exudate Amount?- None  ?? ??Exudate Odor?- None  ?? ??Edge?- Well defined  ?? ??Surrounding Tissue Color?- Normal  ?? ??Surrounding Tissue Condition?- Edematous  ?  ?4. Toe Left Lateral, Other: L Great Toe Trauma?- last charted: 12/15/2021 13:07  ?? ??Assessment Done By?- Wound Care Team  ?? ??Abnormality Pattern?- Flat  ?? ??Abnormality Color?- Red, Yellow  ?? ??Dressing Type?- Band-Aid  ?? ??Dressing Assessment?- Drainage present, Intact  ?? ??Dressing Activity?- Changed  ?? ??Cleansing?- Cleaned with normal saline  ?? ??Length?- 1.8 cm  ?? ??Width?- 2.2 cm  ?? ??Depth?- 0.1 cm  ?? ??Wound Bed Tissue Type?- Dry, Necrotic tissue, eschar  ?? ??Exudate Amount?- Small  ?? ??Exudate Type?- Serosanguineous  ?? ??Exudate Odor?- None  ?? ??Edge?- Well defined  ?? ??Surrounding Tissue Color?- Erythema  ?? ??Surrounding Tissue Condition?- Edematous  ?? ??Topical Agent Application?- Ointment  ?  ?5. Toe Left Lateral, Other: 2nd Toe Trauma?- last charted: 12/15/2021 13:07  ?? ??Assessment Done By?- Wound Care Team  ?? ??Abnormality Pattern?- Flat  ?? ??Abnormality Color?- Red  ?? ??Dressing Type?- Band-Aid  ?? ??Dressing Assessment?- Drainage present, Intact  ?? ??Dressing Activity?- Changed  ?? ??Cleansing?- Cleaned with normal saline  ?? ??Length?- 1.2 cm  ?? ??Width?- 0.9 cm  ?? ??Depth?- 0.1 cm  ?? ??Wound Bed Tissue Type?- Dry, Necrotic tissue, eschar  ?? ??Exudate Amount?- Small  ?? ??Exudate Type?- Serosanguineous  ?? ??Exudate Odor?- None  ??  ??Edge?- Well defined  ?? ??Surrounding Tissue Color?- Erythema  ?? ??Surrounding Tissue Condition?- Edematous  ?? ??Topical Agent Application?- Ointment  ?  Patient comes in today?for reevaluation?of?new wounds on the right great toe and?left second toe?and?wound on the right heel and?wounds on the dorsum of the?left foot.? Today?ChloraPrep was used to clean the?left great toe and left second toe and?15 blade was used to score the areas?of necrosis..? A selective debridement on the right heel was done?with?scissors #15 blade. ?This area?of?necrotic tissue was excised?and hemostasis was achieved with pressure.? Right heel had gentamicin ointment Mesalt gauze?ABD pads Kerlix?was applied.? Patient will change this daily.? Right ankle and right lateral foot ulcers had Betadine applied and these are healing.? Daily?Betadine at home.? Left great toe and left second toe?were dressed with Medihoney Mesalt and cover dressing. ?This is to be changed daily at home.? Tubigrip E?was applied?for compression of both lower extremities. ?Patient is still on oral antibiotics and seems to be improving.? Return in 1 week for follow-up.  Physical Exam  Vitals & Measurements  T:?36.6? ?C (Oral)? HR:?79(Peripheral)? RR:?18? BP:?125/86? SpO2:?94%?  ?  Assessment/Plan  1.?Pressure ulcer of right heel, stage 3?L89.613  Ordered:  Wound Care Outpatient, *Est. 12/22/21 3:00:00 CST, *Est. Stop date 12/22/21 3:00:00 CST, [facility], FU with Physician in 1 week  Wound Care Team Treatment, 12/15/21 13:48:00 CST, Stop date 12/15/21 13:48:00 CST, Right heel: Gentamicin ointment Mesalt gauze ABD and Kerlix daily. Right ankle and right lateral foot Betadine daily. Left great toe and left second toe Mesalt Medihoney cover dressing daily....  ?  2.?Lymphedema of leg?I89.0  Ordered:  Wound Care Outpatient, *Est. 12/22/21 3:00:00 CST, *Est. Stop date 12/22/21 3:00:00 CST, [facility], FU with Physician in 1 week  Wound Care Team  Treatment, 12/15/21 13:48:00 CST, Stop date 12/15/21 13:48:00 CST, Right heel: Gentamicin ointment Mesalt gauze ABD and Kerlix daily. Right ankle and right lateral foot Betadine daily. Left great toe and left second toe Mesalt Medihoney cover dressing daily....  ?  3.?Quadriplegia?G82.50  Ordered:  Wound Care Outpatient, *Est. 12/22/21 3:00:00 CST, *Est. Stop date 12/22/21 3:00:00 CST, [facility], FU with Physician in 1 week  Wound Care Team Treatment, 12/15/21 13:48:00 CST, Stop date 12/15/21 13:48:00 CST, Right heel: Gentamicin ointment Mesalt gauze ABD and Kerlix daily. Right ankle and right lateral foot Betadine daily. Left great toe and left second toe Mesalt Medihoney cover dressing daily....  ?  4.?Toe ulcer?L97.509  Ordered:  Wound Care Outpatient, *Est. 12/22/21 3:00:00 CST, *Est. Stop date 12/22/21 3:00:00 CST, [facility], FU with Physician in 1 week  Wound Care Team Treatment, 12/15/21 13:48:00 CST, Stop date 12/15/21 13:48:00 CST, Right heel: Gentamicin ointment Mesalt gauze ABD and Kerlix daily. Right ankle and right lateral foot Betadine daily. Left great toe and left second toe Mesalt Medihoney cover dressing daily....  ?  5.?Pressure ulcer with abrasion, blister, partial thickness skin loss involving epidermis and/or dermis?L89.92  Ordered:  Wound Care Outpatient, *Est. 12/22/21 3:00:00 CST, *Est. Stop date 12/22/21 3:00:00 CST, [facility], FU with Physician in 1 week  Wound Care Team Treatment, 12/15/21 13:48:00 CST, Stop date 12/15/21 13:48:00 CST, Right heel: Gentamicin ointment Mesalt gauze ABD and Kerlix daily. Right ankle and right lateral foot Betadine daily. Left great toe and left second toe Mesalt Medihoney cover dressing daily....  ?   Problem List/Past Medical History  Ongoing  Chronic skin ulcer  DM (diabetes mellitus)  Infected decubitus ulcer  Lymphedema of leg  Neurogenic bladder  Obesity  Open wound of abdomen  Pressure ulcer of heel  Pressure ulcer of  right ischium  Quadriplegia  Quadriplegic spinal paralysis  Skin eschar  Historical  Pressure ulcer of right foot stage 3  Medications  Augmentin 500 mg-125 mg oral tablet, 1 tab(s), Oral, TID  Bactroban 2% topical ointment, 1 aruna, TOP, TID,? ?Not Taking, Completed Rx  BASAGLAR INJ 100UNIT  Bydureon BCise 2 mg/0.85 mL subcutaneous suspension, extended release,? ?Not Taking per Prescriber  Ciprofloxacin Hcl 500 Mg Tab, 500 mg= 1 tab(s), BID,? ?Not Taking, Completed Rx  clindamycin 150 mg oral capsule, 300 mg= 2 cap(s), Oral, q6hr,? ?Not Taking, Completed Rx  Eliquis Starter Pack for Treatment of DVT and PE 5 mg oral tablet, 5 mg= 1 tab(s), Oral, BID  gentamicin 0.1% topical ointment, See Instructions, 1 refills  gentamicin 0.1% topical ointment, 1 aruna, TOP, Daily,? ?Not Taking, Completed Rx  gentamicin 0.1% topical ointment, 1 aruna, TOP, Daily, 2 refills  GLIPIZIDE TAB 10MG, 10 mg= 1 tab(s), Oral, BID,? ?Not Taking per Prescriber  JANUVIA TAB 100MG, 100 mg= 1 tab(s), Oral, Daily,? ?Not Taking per Prescriber  Januvia 50 mg oral tablet, 50 mg= 1 tab(s), Oral, Daily  Ketoconazole 2% Shampoo,? ?Not Taking, Completed Rx  Levofloxacin 500 Mg Tablet, 500 mg= 1 tab(s), Oral, Daily,? ?Not Taking, Completed Rx  metoprolol succinate 50 mg oral tablet, extended release, 50 mg= 1 tab(s), Oral, BID  MUPIROCIN OIN 2%,? ?Not taking  MYRBETRIQ TAB 50MG, 50 mg= 1 tab(s), Oral, Daily  OXYBUTYNIN TAB 5MG,? ?Not Taking per Prescriber  ReliOn/NovoLIN R 100 units/mL injectable solution  sulfamethoxazole-trimethoprim 800 mg-160 mg oral tablet, 1 tab(s), Oral, BID,? ?Not Taking, Completed Rx  Tramadol Hcl Tab 50 Mg, 50 mg= 1 tab(s), Oral, q4-6hr,? ?Not Taking, Completed Rx  Allergies  No Known Allergies  Social History  Abuse/Neglect  No, 05/19/2020  Tobacco  Never (less than 100 in lifetime), N/A, 05/19/2020  Never smoker, 05/01/2017  Health Maintenance  Health Maintenance  ???Pending?(in the next year)  ??? ??OverDue  ??? ? ? ?Obesity  Screening due??01/01/21??and every 1??year(s)  ??? ? ? ?Alcohol Misuse Screening due??01/02/21??and every 1??year(s)  ??? ? ? ?ADL Screening due??05/19/21??and every 1??year(s)  ??? ??Due?  ??? ? ? ?Aspirin Therapy for CVD Prevention due??12/15/21??and every 1??year(s)  ??? ? ? ?Medicare Annual Wellness Exam due??12/15/21??and every 1??year(s)  ??? ? ? ?Tetanus Vaccine due??12/15/21??and every 10??year(s)  ???Satisfied?(in the past 1 year)  ??? ??Satisfied?  ??? ? ? ?Blood Pressure Screening on??12/15/21.??Satisfied by Sally MADDOX, Ca Sánchez  ?

## 2022-05-04 NOTE — HISTORICAL [facility]
This is a historical note converted from Becca. Formatting and pictures may have been removed.  Please reference Becca for original formatting and attached multimedia. Chief Complaint  C/O recurring pressure ulcer to heel  Rt. buttock, ?ischial ulcer?  Physical Exam  Vitals & Measurements  T:?36.9? ?C (Oral)? HR:?136(Peripheral)? RR:?18? BP:?112/77? SpO2:?98%?  HT:?172.7?cm? WT:?102?kg?  Assessment/Plan  1.?Quadriplegia?G82.50  2.?Pressure ulcer of heel?L89.609  3.?Skin eschar?R23.4  4.?Pressure ulcer of right ischium?L89.319  ?Incision/Wounds  ?1. Right Other: ischial pressure ulcer?- last charted: 07/21/2020 16:06  ?? ??Assessment Done By?- Wound Care Team  ?? ??Pressure Point?- Bony prominence  ?? ??Dressing Type?- Gauze dressing, Honey, Medicated packing strips, Tape  ?? ??Dressing Assessment?- Drainage present, Intact  ?? ??Dressing Activity?- Changed  ?? ??Cleansing?- Cleaned with normal saline  ?? ??Length?- 2 cm  ?? ??Width?- 2.2 cm  ?? ??Depth?- 1.2 cm  ?? ??Wound Bed Tissue Type?- Friable, Granulating, Necrotic tissue, eschar, Necrotic tissue, slough, Pale Pink, Stringy  ?? ??Percent Granulated?- 50 %  ?? ??Percent Necrotic Tissue Eschar?- 10 %  ?? ??Percent Other Tissue?- 40 %  ?? ??Pressure Ulcer Stage?- III  ?? ??Exudate Amount?- Moderate  ?? ??Exudate Type?- Serosanguineous  ?? ??Exudate Odor?- None  ?? ??Edge?- Well defined  ?? ??Surrounding Tissue Color?- Erythema  ?? ??Surrounding Tissue Condition?- Friable, Intact  ?? ??Status?- Improving  ?? ??Topical Agent Application?- Gel  ?  ?2. Buttock Right Inner?- last charted: 07/21/2020 16:06  ?? ??Assessment Done By?- Wound Care Team  ?? ??Abnormality Pattern?- Clustered  ?? ??Pressure Point?- None  ?? ??Dressing Type?- Absorptive  ?? ??Dressing Assessment?- Drainage present, Intact  ?? ??Dressing Activity?- Removed  ?? ??Cleansing?- Cleaned with normal saline  ?? ??Length?- 5.0 cm  ?? ??Width?- 3.5 cm  ?? ??Depth?- 0.1 cm  ?? ??Wound Bed Tissue Type?-  Epithelialized, Erythema, Friable, Necrotic tissue, eschar, Necrotic tissue, slough  ?? ??Pressure Ulcer Stage?- Unstageable  ?? ??Exudate Amount?- Moderate  ?? ??Exudate Type?- Serosanguineous  ?? ??Exudate Odor?- None  ?? ??Edge?- Poorly defined  ?? ??Surrounding Tissue Color?- Erythema  ?? ??Surrounding Tissue Condition?- Excoriated, Friable  ?? ??Status?- Improving  For follow-up today his wounds are much improved status post his debridement as well as daily maintenance debridement dressing changes?we will continue same?and have him follow-up in 1 week  5.?Neurogenic bladder?N31.9  6.?DM (diabetes mellitus)?E11.9  Orders:  Wound Care Outpatient, *Est. 07/28/20 3:00:00 CDT, *Est. Stop date 07/28/20 3:00:00 CDT, [facility], FU with Physician in 1 week  Wound Care Team Treatment, 07/21/20 16:18:00 CDT, Stop date 07/21/20 16:18:00 CDT, continue [facility] and medihoney daily   Problem List/Past Medical History  Ongoing  Chronic skin ulcer  DM (diabetes mellitus)  Neurogenic bladder  Obesity  Open wound of abdomen  Pressure ulcer of heel  Pressure ulcer of right ischium  Quadriplegia  Quadriplegic spinal paralysis  Skin eschar  Historical  Pressure ulcer of right foot stage 3  Medications  Bactroban 2% topical ointment, 1 aruna, TOP, TID,? ?Not Taking, Completed Rx  BASAGLAR INJ 100UNIT  Bydureon BCise 2 mg/0.85 mL subcutaneous suspension, extended release,? ?Not Taking per Prescriber  Ciprofloxacin Hcl 500 Mg Tab, 500 mg= 1 tab(s), BID,? ?Not Taking, Completed Rx  clindamycin 150 mg oral capsule, 300 mg= 2 cap(s), Oral, q6hr,? ?Not Taking, Completed Rx  GLIPIZIDE TAB 10MG, 10 mg= 1 tab(s), Oral, BID,? ?Not Taking per Prescriber  JANUVIA TAB 100MG, 100 mg= 1 tab(s), Oral, Daily,? ?Not Taking per Prescriber  Januvia 50 mg oral tablet, 50 mg= 1 tab(s), Oral, Daily  Ketoconazole 2% Shampoo,? ?Not Taking, Completed Rx  Levofloxacin 500 Mg Tablet, 500 mg= 1 tab(s), Oral, Daily,? ?Not Taking, Completed Rx  MUPIROCIN OIN  2%,? ?Not taking  MYRBETRIQ TAB 50MG, 50 mg= 1 tab(s), Oral, Daily  OXYBUTYNIN TAB 5MG,? ?Not Taking per Prescriber  ReliOn/NovoLIN R 100 units/mL injectable solution  Tramadol Hcl Tab 50 Mg, 50 mg= 1 tab(s), Oral, q4-6hr,? ?Not Taking, Completed Rx  Allergies  No Known Allergies  Social History  Abuse/Neglect  No, 05/19/2020  Tobacco  Never (less than 100 in lifetime), N/A, 05/19/2020  Never smoker, 05/01/2017  Health Maintenance  Health Maintenance  ???Pending?(in the next year)  ??? ??OverDue  ??? ? ? ?Obesity Screening due??01/01/20??and every 1??year(s)  ??? ? ? ?Alcohol Misuse Screening due??01/02/20??and every 1??year(s)  ??? ??Due?  ??? ? ? ?Aspirin Therapy for CVD Prevention due??07/21/20??and every 1??year(s)  ??? ? ? ?Medicare Annual Wellness Exam due??07/21/20??and every 1??year(s)  ??? ? ? ?Tetanus Vaccine due??07/21/20??and every 10??year(s)  ??? ??Due In Future?  ??? ? ? ?ADL Screening not due until??05/19/21??and every 1??year(s)  ???Satisfied?(in the past 1 year)  ??? ??Satisfied?  ??? ? ? ?ADL Screening on??05/19/20.??Satisfied by Jolynn Madrid LPN  ??? ? ? ?Blood Pressure Screening on??07/21/20.??Satisfied by Jolynn Madrid LPN  ?

## 2022-05-04 NOTE — HISTORICAL [facility] CERNER
This is a historical note converted from Becca. Formatting and pictures may have been removed.  Please reference Becca for original formatting and attached multimedia. Chief Complaint  C/O recurring pressure ulcer to heel  Rt. buttock, ?ischial ulcer?  History of Present Illness  see nurse notes  Review of Systems  see nurse notes  Physical Exam  Vitals & Measurements  T:?36.8? ?C (Oral)? HR:?130(Peripheral)? RR:?18? BP:?144/95? SpO2:?99%?  HT:?172.7?cm? WT:?102?kg?  Assessment/Plan  1.?Quadriplegia?G82.50  2.?Pressure ulcer of heel?L89.609  3.?Skin eschar?R23.4  4.?Pressure ulcer of right ischium?L89.319  ?Incision/Wounds  ?1. Right Other: ischial pressure ulcer?- last charted: 07/01/2020 11:07  ?? ??Assessment Done By?- Wound Care Team  ?? ??Abnormality Pattern?- Flat  ?? ??Pressure Point?- Bony prominence  ?? ??Dressing Type?- Gauze dressing, Honey, Nonadherent dressing, Tape  ?? ??Dressing Assessment?- Dry, Intact  ?? ??Dressing Activity?- Removed  ?? ??Cleansing?- Cleaned with normal saline  ?? ??Length?- 2.6 cm  ?? ??Width?- 2.3 cm  ?? ??Depth?- 0.1 cm  ?? ??Wound Bed Tissue Type?- Friable, Granulating, Necrotic tissue, eschar, Necrotic tissue, slough, Stringy  ?? ??Percent Necrotic Tissue Eschar?- 80 %  ?? ??Percent Other Tissue?- 20 %  ?? ??Pressure Ulcer Stage?- Unstageable  ?? ??Exudate Amount?- Small  ?? ??Exudate Type?- Serosanguineous  ?? ??Exudate Odor?- None  ?? ??Edge?- Well defined  ?? ??Surrounding Tissue Color?- Normal  ?? ??Surrounding Tissue Condition?- Friable, Intact  ?? ??Topical Agent Application?- Gel  ?Pt examined and notes review above. Some necrotic tissue,eschar and slough with some granualation tissue. Debridment of sub tissue was done from unviable tissue to viable sub tissue using scalpel and curette. S/P measuremtns 2.6x2.3x0.8. Add Mesalt with medihoney daily and cover dressing.RTC in 2 weeks.  5.?Neurogenic bladder?N31.9  6.?DM (diabetes mellitus)?E11.9  Orders:  Wound Care  Outpatient, *Est. 07/15/20 3:00:00 CDT, *Est. Stop date 07/15/20 3:00:00 CDT, [facility], Return to Clinic 2 weeks  Wound Care Team Treatment, 07/01/20 11:23:00 CDT, Stop date 07/01/20 11:23:00 CDT, RTC in 2 weeks. Dressing daily with mesalt and medihoney and cover dressing .   Problem List/Past Medical History  Ongoing  Chronic skin ulcer  DM (diabetes mellitus)  Neurogenic bladder  Obesity  Open wound of abdomen  Pressure ulcer of heel  Pressure ulcer of right ischium  Quadriplegia  Quadriplegic spinal paralysis  Skin eschar  Historical  Pressure ulcer of right foot stage 3  Medications  Bactroban 2% topical ointment, 1 aruna, TOP, TID,? ?Not Taking, Completed Rx  BASAGLAR INJ 100UNIT  Bydureon BCise 2 mg/0.85 mL subcutaneous suspension, extended release,? ?Not Taking per Prescriber  Ciprofloxacin Hcl 500 Mg Tab, 500 mg= 1 tab(s), BID,? ?Not Taking, Completed Rx  clindamycin 150 mg oral capsule, 300 mg= 2 cap(s), Oral, q6hr,? ?Not Taking, Completed Rx  GLIPIZIDE TAB 10MG, 10 mg= 1 tab(s), Oral, BID,? ?Not Taking per Prescriber  JANUVIA TAB 100MG, 100 mg= 1 tab(s), Oral, Daily,? ?Not Taking per Prescriber  Januvia 50 mg oral tablet, 50 mg= 1 tab(s), Oral, Daily  Ketoconazole 2% Shampoo,? ?Not Taking, Completed Rx  Levofloxacin 500 Mg Tablet, 500 mg= 1 tab(s), Oral, Daily,? ?Not Taking, Completed Rx  MUPIROCIN OIN 2%,? ?Not taking  MYRBETRIQ TAB 50MG, 50 mg= 1 tab(s), Oral, Daily  OXYBUTYNIN TAB 5MG,? ?Not Taking per Prescriber  ReliOn/NovoLIN R 100 units/mL injectable solution  Tramadol Hcl Tab 50 Mg, 50 mg= 1 tab(s), Oral, q4-6hr,? ?Not Taking, Completed Rx  Allergies  No Known Allergies  Social History  Abuse/Neglect  No, 05/19/2020  Tobacco  Never (less than 100 in lifetime), N/A, 05/19/2020  Never smoker, 05/01/2017  Health Maintenance  Health Maintenance  ???Pending?(in the next year)  ??? ??OverDue  ??? ? ? ?Alcohol Misuse Screening due??01/01/20??and every 1??year(s)  ??? ? ? ?Obesity Screening  due??01/01/20??and every 1??year(s)  ??? ??Due?  ??? ? ? ?Aspirin Therapy for CVD Prevention due??07/01/20??and every 1??year(s)  ??? ? ? ?Medicare Annual Wellness Exam due??07/01/20??and every 1??year(s)  ??? ? ? ?Tetanus Vaccine due??07/01/20??and every 10??year(s)  ??? ??Due In Future?  ??? ? ? ?ADL Screening not due until??05/19/21??and every 1??year(s)  ???Satisfied?(in the past 1 year)  ??? ??Satisfied?  ??? ? ? ?ADL Screening on??05/19/20.??Satisfied by Jolynn Madrid LPN  ??? ? ? ?Blood Pressure Screening on??07/01/20.??Satisfied by Junior MADDOX, Nguyen Nunn  ?

## 2022-05-04 NOTE — HISTORICAL [facility] C[facility]ER
This is a historical note converted from Becca. Formatting and pictures may have been removed.  Please reference Becca for original formatting and attached multimedia. History of Present Illness  Incision/Wounds  ?1. Heel Right Pressure ulcer?- last charted: 05/25/2021 12:01  ?? ??Assessment Done By?- Wound Care Team  ?? ??Pressure Point?- Bony prominence  ?? ??Dressing Type?- Foam, Medicated packing strips, Tape  ?? ??Dressing Assessment?- Clean, Dry, Intact  ?? ??Dressing Activity?- Changed  ?? ??Cleansing?- Cleaned with normal saline  ?? ??Length?- 0.8 cm  ?? ??Width?- 1.0 cm  ?? ??Depth?- 0.1 cm  ?? ??Wound Bed Tissue Type?- Dry, Granulating, Pale Pink, Scab  ?? ??Pressure Ulcer Stage?- III  ?? ??Exudate Amount?- Scant  ?? ??Exudate Type?- Serous  ?? ??Exudate Odor?- None  ?? ??Edge?- Well defined  ?? ??Surrounding Tissue Color?- Normal  ?? ??Surrounding Tissue Condition?- Dry, Intact  ?? ??Status?- Improving  ?? ??Topical Agent Application?- Ointment  Right heel pressure ulcer?with?mild opening.? The wound?is dry with granulation tissue?pale pink and some scabbing.? Area was cleaned with saline?and Mesalt and foam was applied. ?This will be done every other day at home.? Patient is return in 2 weeks.  Physical Exam  Vitals & Measurements  T:?36.8? ?C (Oral)? HR:?129(Peripheral)? RR:?16? BP:?116/86? SpO2:?99%?  ?  Assessment/Plan  1.?Pressure ulcer of right ischium?L89.319  Ordered:  Wound Care Outpatient, *Est. 06/01/21 3:00:00 CDT, *Est. Stop date 06/01/21 3:00:00 CDT, [facility], FU with Physician in 2 weeks  Wound Care Team Treatment, 05/25/21 13:30:00 CDT, Stop date 05/25/21 13:30:00 CDT, Clean heel area with saline apply Mesalt and foam with tape. Do this every other day. Return in 2 weeks for visit.  ?  2.?Pressure ulcer of heel?L89.609  Ordered:  Wound Care Outpatient, *Est. 06/01/21 3:00:00 CDT, *Est. Stop date 06/01/21 3:00:00 CDT, [facility], FU with Physician in 2 weeks  Wound  Care Team Treatment, 05/25/21 13:30:00 CDT, Stop date 05/25/21 13:30:00 CDT, Clean heel area with saline apply Mesalt and foam with tape. Do this every other day. Return in 2 weeks for visit.  ?  3.?Quadriplegia?G82.50  Ordered:  Wound Care Outpatient, *Est. 06/01/21 3:00:00 CDT, *Est. Stop date 06/01/21 3:00:00 CDT, [facility], FU with Physician in 2 weeks  Wound Care Team Treatment, 05/25/21 13:30:00 CDT, Stop date 05/25/21 13:30:00 CDT, Clean heel area with saline apply Mesalt and foam with tape. Do this every other day. Return in 2 weeks for visit.  ?   Problem List/Past Medical History  Ongoing  Chronic skin ulcer  DM (diabetes mellitus)  Neurogenic bladder  Obesity  Open wound of abdomen  Pressure ulcer of heel  Pressure ulcer of right ischium  Quadriplegia  Quadriplegic spinal paralysis  Skin eschar  Historical  Pressure ulcer of right foot stage 3  Medications  Bactroban 2% topical ointment, 1 aruna, TOP, TID,? ?Not Taking, Completed Rx  BASAGLAR INJ 100UNIT  Bydureon BCise 2 mg/0.85 mL subcutaneous suspension, extended release,? ?Not Taking per Prescriber  Ciprofloxacin Hcl 500 Mg Tab, 500 mg= 1 tab(s), BID,? ?Not Taking, Completed Rx  clindamycin 150 mg oral capsule, 300 mg= 2 cap(s), Oral, q6hr,? ?Not Taking, Completed Rx  gentamicin 0.1% topical ointment, 1 aruna, TOP, Daily,? ?Not Taking, Completed Rx  gentamicin 0.1% topical ointment, 1 aruna, TOP, Daily, 2 refills  GLIPIZIDE TAB 10MG, 10 mg= 1 tab(s), Oral, BID,? ?Not Taking per Prescriber  JANUVIA TAB 100MG, 100 mg= 1 tab(s), Oral, Daily,? ?Not Taking per Prescriber  Januvia 50 mg oral tablet, 50 mg= 1 tab(s), Oral, Daily  Ketoconazole 2% Shampoo,? ?Not Taking, Completed Rx  Levofloxacin 500 Mg Tablet, 500 mg= 1 tab(s), Oral, Daily,? ?Not Taking, Completed Rx  MUPIROCIN OIN 2%,? ?Not taking  MYRBETRIQ TAB 50MG, 50 mg= 1 tab(s), Oral, Daily  OXYBUTYNIN TAB 5MG,? ?Not Taking per Prescriber  ReliOn/NovoLIN R 100 units/mL injectable  solution  sulfamethoxazole-trimethoprim 800 mg-160 mg oral tablet, 1 tab(s), Oral, BID,? ?Not Taking, Completed Rx  Tramadol Hcl Tab 50 Mg, 50 mg= 1 tab(s), Oral, q4-6hr,? ?Not Taking, Completed Rx  Allergies  No Known Allergies  Social History  Abuse/Neglect  No, 05/19/2020  Tobacco  Never (less than 100 in lifetime), N/A, 05/19/2020  Never smoker, 05/01/2017  Health Maintenance  Health Maintenance  ???Pending?(in the next year)  ??? ??OverDue  ??? ? ? ?Obesity Screening due??01/01/21??and every 1??year(s)  ??? ? ? ?Alcohol Misuse Screening due??01/02/21??and every 1??year(s)  ??? ? ? ?ADL Screening due??05/19/21??and every 1??year(s)  ??? ??Due?  ??? ? ? ?Aspirin Therapy for CVD Prevention due??05/25/21??and every 1??year(s)  ??? ? ? ?Medicare Annual Wellness Exam due??05/25/21??and every 1??year(s)  ??? ? ? ?Tetanus Vaccine due??05/25/21??and every 10??year(s)  ???Satisfied?(in the past 1 year)  ??? ??Satisfied?  ??? ? ? ?Blood Pressure Screening on??05/25/21.??Satisfied by Junior MADDOX, Nguyen Nunn  ?      Patients wound today?is a stage III?ulcer?that has reopened.? This is superficial at this time.? Wound care at this time will consist of?Mesalt?and foam?with tape.? This should be?done every other day?and?is simple enough to be done?by family members.? Other problem is offloading of this extremity.? This should be done?constantly.? This will be rechecked in?2 weeks.? 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 Patient is also offloading.? No additional wound care needed at this site at this time.? Patient offloads?with?additional equipment?due to his contractures.

## 2022-05-04 NOTE — HISTORICAL [facility] CERN[facility]
This is a historical note converted from Becca. Formatting and pictures may have been removed.  Please reference Becca for original formatting and attached multimedia. Chief Complaint  C/O recurring pressure ulcer to heel  Rt. buttock, ?ischial ulcer?  History of Present Illness  The patient?is?quadriplegic?and has had difficulty with recurrent?ischial?decubitus ulcers and chronic?right heel decubitus ulcer.? His caregiver?noticed?a new area of blackness?on the?right ischium?and?is concerned about the possibility of underlying infection.? The patient continues to be gainfully employed and spends?many hours in his?wheelchair.? He denies fever or chills.? He has an indwelling catheter and notes that his urine has been odorous.  Review of Systems  Not significantly different than history of present and past medical illnesses.  Physical Exam  Vitals & Measurements  T:?37.2? ?C (Oral)? HR:?136(Peripheral)? RR:?18? BP:?111/78?  HT:?172.7?cm? WT:?102?kg?  The patient is?quadriplegic?and?wheelchair confined. ?He is moderately obese. ?He is appropriately responsive to all questions and commands.? On examination of his right heel?reveals a small ulcer with moderate?periwound?hyper trophic skin changes?and some desquamation.? Centrally the wound is starting to epithelialize.  ?  The patient also has?moderate inflammatory changes extensively involving his right ischium.? In the center of the?area of inflammatory changes he has an eschar approximately 2.2 cm in diameter.? There is a rim of?erythema. ?There is no evidence of fluctuance.  ?  Incision/Wounds  ?1. Heel Right Pressure ulcer?- last charted: 05/19/2020 14:57  ?? ??Assessment Done By?- Wound Care Team  ?? ??Abnormality Pattern?- Clustered, Flat  ?? ??Pressure Point?- Bony prominence  ?? ??Dressing Type?- Gauze dressing, Tape  ?? ??Dressing Assessment?- Dry, Intact  ?? ??Dressing Activity?- Removed  ?? ??Cleansing?- Cleaned with normal saline  ?? ??Length?- 2.0 cm  ??  ??Width?- 1.2 cm  ?? ??Depth?- 0 cm  ?? ??Wound Bed Tissue Type?- Dry, Necrotic tissue, eschar  ?? ??Percent Other Tissue?- 100 %  ?? ??Exudate Amount?- None  ?? ??Edge?- Attached to wound bed  ?? ??Surrounding Tissue Color?- Normal  ?? ??Surrounding Tissue Condition?- Edematous  ?  ?2. Right Other: ischial pressure ulcer?- last charted: 05/19/2020 14:57  ?? ??Assessment Done By?- Wound Care Team  ?? ??Abnormality Pattern?- Flat  ?? ??Pressure Point?- Bony prominence  ?? ??Dressing Type?- Gauze dressing  ?? ??Dressing Assessment?- Dry, Intact  ?? ??Dressing Activity?- Removed  ?? ??Cleansing?- Cleaned with normal saline  ?? ??Length?- 2.3 cm  ?? ??Width?- 2.5 cm  ?? ??Depth?- 0.1 cm  ?? ??Wound Bed Tissue Type?- Erythema, Necrotic tissue, eschar, Pale Pink  ?? ??Percent Necrotic Tissue Eschar?- 100 %  ?? ??Exudate Amount?- None  ?? ??Edge?- Attached to wound bed, Poorly defined  ?? ??Surrounding Tissue Color?- Erythema  ?? ??Surrounding Tissue Condition?- Denuded, Friable  ?  ?  Assessment/Plan  1.?Quadriplegia?G82.50  ?Continue offloading maneuvers.  2.?Pressure ulcer of heel?L89.609  ?Apply foam to heel?daily.  3.?Skin eschar?R23.4  ?The?right ischium?eschar was?scored with a #15 blade and?medical honey will be applied daily.? The?periwound area?around the eschar?will be treated with topical gentamicin.  4.?Pressure ulcer of right ischium?L89.319  ?Use doxycycline 100 mg twice daily x7 days.  5.?Obesity?E66.9  6.?DM (diabetes mellitus)?E11.9  ?Tight control of diabetes is recommended.  Orders:  doxycycline, 100 mg = 1 cap(s), Oral, BID, X 7 day(s), # 14 cap(s), 0 Refill(s), Pharmacy: [facility], Patient Verbalizes Understanding, 172.7, cm, Height/Length Dosing, 05/19/20 14:26:00 CDT, 102, kg, Weight Dosing, 05/19/20 14:26:00 CDT  gentamicin topical, See Instructions, Use as directed to affected skin area daily.05, # 30 gm, 0 Refill(s), Pharmacy: [facility], 172.7, cm, Height/Length Dosing,  05/19/20 14:26:00 CDT, 102, kg, Weight Dosing, 05/19/20 14:26:00 CDT  Wound Care Outpatient, *Est. 05/26/20 3:00:00 CDT, *Est. Stop date 05/26/20 3:00:00 CDT, [facility], FU with Physician in 1 week  Wound Care Team Treatment, 05/19/20 15:38:00 CDT, Stop date 05/19/20 15:38:00 CDT, By medical honey to right ishium wound daily. Apply Ag foam to right ischium and heel daily. Use doxycycline and gentamicin antibiotic therapy as advised daily.   Problem List/Past Medical History  Ongoing  Chronic skin ulcer  DM (diabetes mellitus)  Obesity  Open wound of abdomen  Pressure ulcer of heel  Pressure ulcer of right ischium  Quadriplegia  Quadriplegic spinal paralysis  Skin eschar  Historical  Pressure ulcer of right foot stage 3  Medications  Bactroban 2% topical ointment, 1 aruna, TOP, TID,? ?Not Taking, Completed Rx  BASAGLAR INJ 100UNIT  Bydureon BCise 2 mg/0.85 mL subcutaneous suspension, extended release,? ?Not Taking per Prescriber  Ciprofloxacin Hcl 500 Mg Tab, 500 mg= 1 tab(s), BID,? ?Not Taking, Completed Rx  clindamycin 150 mg oral capsule, 300 mg= 2 cap(s), Oral, q6hr,? ?Not Taking, Completed Rx  doxycycline hyclate 100 mg oral capsule, 100 mg= 1 cap(s), Oral, BID  gentamicin 0.1% topical ointment, See Instructions  GLIPIZIDE TAB 10MG, 10 mg= 1 tab(s), Oral, BID,? ?Not Taking per Prescriber  JANUVIA TAB 100MG, 100 mg= 1 tab(s), Oral, Daily,? ?Not Taking per Prescriber  Januvia 50 mg oral tablet, 50 mg= 1 tab(s), Oral, Daily  Ketoconazole 2% Shampoo,? ?Not Taking, Completed Rx  Levofloxacin 500 Mg Tablet, 500 mg= 1 tab(s), Oral, Daily,? ?Not Taking, Completed Rx  MUPIROCIN OIN 2%,? ?Not taking  MYRBETRIQ TAB 50MG, 50 mg= 1 tab(s), Oral, Daily  OXYBUTYNIN TAB 5MG,? ?Not Taking per Prescriber  ReliOn/NovoLIN R 100 units/mL injectable solution  Tramadol Hcl Tab 50 Mg, 50 mg= 1 tab(s), Oral, q4-6hr,? ?Not Taking, Completed Rx  Allergies  No Known Allergies  Social History  Abuse/Neglect  No,  05/19/2020  Tobacco  Never (less than 100 in lifetime), N/A, 05/19/2020  Never smoker, 05/01/2017  Health Maintenance  Health Maintenance  ???Pending?(in the next year)  ??? ??OverDue  ??? ? ? ?Alcohol Misuse Screening due??01/01/20??and every 1??year(s)  ??? ? ? ?Obesity Screening due??01/01/20??and every 1??year(s)  ??? ??Due?  ??? ? ? ?Aspirin Therapy for CVD Prevention due??05/19/20??and every 1??year(s)  ??? ? ? ?Medicare Annual Wellness Exam due??05/19/20??and every 1??year(s)  ??? ? ? ?Tetanus Vaccine due??05/19/20??and every 10??year(s)  ???Satisfied?(in the past 1 year)  ??? ??Satisfied?  ??? ? ? ?ADL Screening on??05/19/20.??Satisfied by Jolynn Madrid LPN  ??? ? ? ?Blood Pressure Screening on??05/19/20.??Satisfied by Jolynn Madrid LPN  ?

## 2022-05-04 NOTE — HISTORICAL [facility] CERNER
This is a historical note converted from Becca. Formatting and pictures may have been removed.  Please reference Becca for original formatting and attached multimedia. Chief Complaint  new open wound lower abdomen and blister right heel  History of Present Illness  see nurses notes  Review of Systems  see nurses notes  Physical Exam  Vitals & Measurements  T:?36.8? ?C (Oral)? HR:?84(Peripheral)? RR:?18? BP:?133/88? SpO2:?96%?  ?  Assessment/Plan  1.?Open wound of abdomen?S31.109A  ?Incision/Wounds  ?1. Abdomen Lower, Other: surgical incision?- last charted: 07/10/2019 13:00  ?? ??Assessment Done By?- Wound Care Team  ?? ??Dressing Type?- Medicated packing strips  ?? ??Dressing Assessment?- Drainage present, Intact  ?? ??Dressing Activity?- Changed  ?? ??Cleansing?- Cleaned with normal saline, Irrigated with normal saline  ?? ??Length?- 0.8 cm  ?? ??Width?- 0.5 cm  ?? ??Depth?- 4.5 cm  ?? ??Wound Bed Tissue Type?- Granulating  ?? ??Percent Granulated?- 95 %  ?? ??Percent Epithelialized?- 5 %  ?? ??Exudate Amount?- Heavy  ?? ??Exudate Type?- Serosanguineous  ?? ??Exudate Odor?- None  ?? ??Edge?- Well defined  ?? ??Surrounding Tissue Color?- Erythema  ?? ??Surrounding Tissue Condition?- Edematous, Friable, Intact  ?pt present with new co of OLD SUPRA PUBIC TUBE site opening? pt has instrumentation of his bladder with irrigation by his Urologist next day his old SP tube site opened up on exam there is a new sp tube placed right of the midline and an open wound at the old tube site with no evidence of infection and no drainage from wound at this time. will use mesalt packing daily and fu in one week if the wound does not respond he may need an imaging study to confirm my suspicion. I informed the pt and his mother that even if this were the case no special therapy is needed at this time since he has a new functioning tube in place this wound should heal he will fu in one week  2.?Pressure ulcer,  heel?L89.609  Orders:  Wound Care Outpatient, 07/10/19 13:30:00 CDT, Stop date 07/10/19 13:30:00 CDT  Wound Care Outpatient, *Est. 07/17/19 3:00:00 CDT, *Est. Stop date 07/17/19 3:00:00 CDT, [facility] FU with Physician in 1 week  Wound Care Team Treatment, 07/10/19 13:44:00 CDT, Stop date 07/10/19 13:44:00 CDT, mesalt packing to wound daily   Problem List/Past Medical History  Ongoing  Chronic skin ulcer  DM (diabetes mellitus)  Open wound of abdomen  Quadriplegia  Quadriplegic spinal paralysis  Historical  No qualifying data  Medications  Bactroban 2% topical ointment, 1 aruna, TOP, TID,? ?Not Taking, Completed Rx  BASAGLAR INJ 100UNIT  Bydureon BCise 2 mg/0.85 mL subcutaneous suspension, extended release  Ciprofloxacin Hcl 500 Mg Tab, 500 mg= 1 tab(s), BID,? ?Not Taking, Completed Rx  clindamycin 150 mg oral capsule, 300 mg= 2 cap(s), Oral, q6hr,? ?Not Taking, Completed Rx  GLIPIZIDE TAB 10MG, 10 mg= 1 tab(s), Oral, BID,? ?Not Taking per Prescriber  JANUVIA TAB 100MG, 100 mg= 1 tab(s), Oral, Daily,? ?Not Taking per Prescriber  Ketoconazole 2% Shampoo,? ?Not Taking, Completed Rx  Levofloxacin 500 Mg Tablet, 500 mg= 1 tab(s), Oral, Daily,? ?Not Taking, Completed Rx  MUPIROCIN OIN 2%,? ?Not taking  MYRBETRIQ TAB 50MG, 50 mg= 1 tab(s), Oral, Daily  OXYBUTYNIN TAB 5MG,? ?Not Taking per Prescriber  ReliOn/NovoLIN R 100 units/mL injectable solution  Tramadol Hcl Tab 50 Mg, 50 mg= 1 tab(s), Oral, q4-6hr  Allergies  No Known Allergies  Social History  Tobacco  Never smoker, 05/01/2017  Health Maintenance  Health Maintenance  ???Pending?(in the next year)  ??? ??OverDue  ??? ? ? ?Alcohol Misuse Screening due??01/01/19??and every 1??year(s)  ??? ? ? ?Obesity Screening due??01/01/19??and every 1??year(s)  ??? ? ? ?ADL Screening due??05/30/19??and every 1??year(s)  ??? ??Due?  ??? ? ? ?Aspirin Therapy for CVD Prevention due??07/10/19??and every 1??year(s)  ??? ? ? ?Tetanus Vaccine due??07/10/19??and every  10??year(s)  ???Satisfied?(in the past 1 year)  ??? ??Satisfied?  ??? ? ? ?Blood Pressure Screening on??07/10/19.??Satisfied by Junior MADDOX, Nguyen Nunn  ?

## 2022-05-04 NOTE — HISTORICAL [facility] CERNER
This is a historical note converted from Becca. Formatting and pictures may have been removed.  Please reference Becca for original formatting and attached multimedia. History of Present Illness  see notes. R heel ulcer  Physical Exam  Vitals & Measurements  T:?36.6? ?C (Oral)? HR:?129(Peripheral)? RR:?18? BP:?123/83? SpO2:?98%?  ?  wound is looking better, more epithelial tissue  Assessment/Plan  1.?Pressure ulcer of right heel, stage 3?L89.613  Incision/Wounds  ?1. Heel Right Pressure ulcer?- last charted: 09/28/2021 10:35  ?? ??Assessment Done By?- Wound Care Team  ?? ??Pressure Point?- Bony prominence  ?? ??Dressing Type?- ABD dressing pad, Collagen, Gauze dressing, Rolled Gauze, Silver, Tape, Tubular bandage  ?? ??Dressing Assessment?- Drainage present, Intact  ?? ??Dressing Activity?- Changed  ?? ??Cleansing?- Cleaned with normal saline  ?? ??Length?- 2.3 cm  ?? ??Width?- 4.4 cm  ?? ??Depth?- 0.1 cm  ?? ??Wound Bed Tissue Type?- Fibrotic, Granulating  ?? ??Pressure Ulcer Stage?- III  ?? ??Exudate Amount?- Moderate  ?? ??Exudate Type?- Serosanguineous  ?? ??Exudate Odor?- None  ?? ??Edge?- Well defined  ?? ??Surrounding Tissue Color?- Normal  ?? ??Surrounding Tissue Condition?- Dry, Intact  ?? ??Status?- Improving  ?  ?2. Toe Right Anterior Trauma?- last charted: 09/28/2021 10:35  ?? ??Assessment Done By?- Wound Care Team  ?? ??Rash Distribution?- Localized  ?? ??Pressure Point?- Bony prominence  ?? ??Dressing Type?- None  ?? ??Cleansing?- Cleaned with normal saline  ?? ??Length?- 0.7 cm  ?? ??Width?- 0.6 cm  ?? ??Wound Bed Tissue Type?- Scab  ?? ??Exudate Amount?- None  ?? ??Exudate Type?- Serous  ?? ??Exudate Odor?- None  ?? ??Edge?- Well defined  ?? ??Surrounding Tissue Color?- Normal  ?? ??Surrounding Tissue Condition?- Callus  ?Pt examined and notes review above. Continue collagen and cover dressing to R heel qod?and a bandaid to 5th toe. Continue tubigrip E DOUBLE LAYER. rtc IN 2 WEEKS  Ordered:  Wound  Care Outpatient, *Est. 10/12/21 3:00:00 CDT, *Est. Stop date 10/12/21 3:00:00 CDT, [facility], Return to Clinic 2 weeks  Wound Care Team Treatment, 09/28/21 11:03:00 CDT, Stop date 09/28/21 11:03:00 CDT, Continue collagen and cover dressing to R heel and a bandaid for R 5th toe. RTC in 2 weeks.. Tubigrip E double layer.  ?  2.?Lymphedema of leg?I89.0  Ordered:  Wound Care Outpatient, *Est. 10/12/21 3:00:00 CDT, *Est. Stop date 10/12/21 3:00:00 CDT, [facility], Return to Clinic 2 weeks  Wound Care Team Treatment, 09/28/21 11:03:00 CDT, Stop date 09/28/21 11:03:00 CDT, Continue collagen and cover dressing to R heel and a bandaid for R 5th toe. RTC in 2 weeks.. Tubigrip E double layer.  ?  3.?Quadriplegia?G82.50  Ordered:  Wound Care Outpatient, *Est. 10/12/21 3:00:00 CDT, *Est. Stop date 10/12/21 3:00:00 CDT, [facility], Return to Clinic 2 weeks  Wound Care Team Treatment, 09/28/21 11:03:00 CDT, Stop date 09/28/21 11:03:00 CDT, Continue collagen and cover dressing to R heel and a bandaid for R 5th toe. RTC in 2 weeks.. Tubigrip E double layer.  ?  4.?Toe ulcer?L97.509  Ordered:  Wound Care Outpatient, *Est. 10/12/21 3:00:00 CDT, *Est. Stop date 10/12/21 3:00:00 CDT, [facility], Return to Clinic 2 weeks  Wound Care Team Treatment, 09/28/21 11:03:00 CDT, Stop date 09/28/21 11:03:00 CDT, Continue collagen and cover dressing to R heel and a bandaid for R 5th toe. RTC in 2 weeks.. Tubigrip E double layer.  ?   Problem List/Past Medical History  Ongoing  Chronic skin ulcer  DM (diabetes mellitus)  Infected decubitus ulcer  Lymphedema of leg  Neurogenic bladder  Obesity  Open wound of abdomen  Pressure ulcer of heel  Pressure ulcer of right ischium  Quadriplegia  Quadriplegic spinal paralysis  Skin eschar  Historical  Pressure ulcer of right foot stage 3  Medications  Bactroban 2% topical ointment, 1 aruna, TOP, TID,? ?Not Taking, Completed Rx  BASAGLAR INJ 100UNIT  Bydureon BCise 2 mg/0.85  mL subcutaneous suspension, extended release,? ?Not Taking per Prescriber  Ciprofloxacin Hcl 500 Mg Tab, 500 mg= 1 tab(s), BID,? ?Not Taking, Completed Rx  clindamycin 150 mg oral capsule, 300 mg= 2 cap(s), Oral, q6hr,? ?Not Taking, Completed Rx  gentamicin 0.1% topical ointment, 1 aruna, TOP, Daily,? ?Not Taking, Completed Rx  gentamicin 0.1% topical ointment, 1 aruna, TOP, Daily, 2 refills  GLIPIZIDE TAB 10MG, 10 mg= 1 tab(s), Oral, BID,? ?Not Taking per Prescriber  JANUVIA TAB 100MG, 100 mg= 1 tab(s), Oral, Daily,? ?Not Taking per Prescriber  Januvia 50 mg oral tablet, 50 mg= 1 tab(s), Oral, Daily  Ketoconazole 2% Shampoo,? ?Not Taking, Completed Rx  Levofloxacin 500 Mg Tablet, 500 mg= 1 tab(s), Oral, Daily,? ?Not Taking, Completed Rx  MUPIROCIN OIN 2%,? ?Not taking  MYRBETRIQ TAB 50MG, 50 mg= 1 tab(s), Oral, Daily  OXYBUTYNIN TAB 5MG,? ?Not Taking per Prescriber  ReliOn/NovoLIN R 100 units/mL injectable solution  sulfamethoxazole-trimethoprim 800 mg-160 mg oral tablet, 1 tab(s), Oral, BID,? ?Not Taking, Completed Rx  Tramadol Hcl Tab 50 Mg, 50 mg= 1 tab(s), Oral, q4-6hr,? ?Not Taking, Completed Rx  Allergies  No Known Allergies  Social History  Abuse/Neglect  No, 05/19/2020  Tobacco  Never (less than 100 in lifetime), N/A, 05/19/2020  Never smoker, 05/01/2017  Health Maintenance  Health Maintenance  ???Pending?(in the next year)  ??? ??OverDue  ??? ? ? ?Obesity Screening due??01/01/21??and every 1??year(s)  ??? ? ? ?Alcohol Misuse Screening due??01/02/21??and every 1??year(s)  ??? ? ? ?ADL Screening due??05/19/21??and every 1??year(s)  ??? ??Due?  ??? ? ? ?Aspirin Therapy for CVD Prevention due??09/28/21??and every 1??year(s)  ??? ? ? ?Medicare Annual Wellness Exam due??09/28/21??and every 1??year(s)  ??? ? ? ?Tetanus Vaccine due??09/28/21??and every 10??year(s)  ???Satisfied?(in the past 1 year)  ??? ??Satisfied?  ??? ? ? ?Blood Pressure Screening on??09/28/21.??Satisfied by Sally MADDOX, Ca Sánchez  ?

## 2022-05-04 NOTE — HISTORICAL [facility]
This is a historical note converted from Becca. Formatting and pictures may have been removed.  Please reference Becca for original formatting and attached multimedia. Chief Complaint  Pressure ulcer?right?heel  History of Present Illness  See nurses notes  Review of Systems  See nurses notes  Physical Exam  Vitals & Measurements  T:?36.6? ?C (Oral)? HR:?84(Peripheral)? RR:?18? BP:?136/81? SpO2:?98%?  ?  Assessment/Plan  1.?Pressure ulcer of right heel?L89.619  ?Incision/Wounds  ?1. Abdomen Lower, Other: surgical incision?- last charted: 08/21/2019 12:10  ?? ??Assessment Done By?- Wound Care Team  ?? ??Dressing Type?- Gauze dressing, Tape  ?? ??Dressing Assessment?- Dry, Intact  ?? ??Dressing Activity?- Changed  ?? ??Cleansing?- Cleaned with normal saline, Irrigated with normal saline  ?? ??Length?- 0.4 cm  ?? ??Width?- 0.4 cm  ?? ??Depth?- 0.1 cm  ?? ??Wound Bed Tissue Type?- Scab  ?? ??Edge?- Well defined  ?? ??Surrounding Tissue Color?- Normal  ?? ??Surrounding Tissue Condition?- Intact  ?? ??Status?- Improving  ?  ?2. Foot Right Plantar Blister?- last charted: 08/21/2019 12:10  ?? ??Assessment Done By?- Wound Care Team  ?? ??Pressure Point?- Bony prominence  ?? ??Dressing Type?- Foam, Medicated packing strips  ?? ??Dressing Assessment?- Drainage present, Intact  ?? ??Dressing Activity?- Changed  ?? ??Cleansing?- Cleaned with normal saline  ?? ??Length?- 1.0 cm  ?? ??Width?- 0.9 cm  ?? ??Depth?- 0.1 cm  ?? ??Wound Bed Tissue Type?- Granulating, Pale Pink  ?? ??Percent Granulated?- 100 %  ?? ??Pressure Ulcer Stage?- III  ?? ??Exudate Amount?- Small  ?? ??Exudate Type?- Serous  ?? ??Exudate Odor?- None  ?? ??Edge?- Attached to wound bed  ?? ??Surrounding Tissue Color?- Normal  ?? ??Surrounding Tissue Condition?- Friable, Intact  ?? ??Status?- Improving  ?The wound bed is clean and granulating so we will switch to Lor every 2 days.? 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 ?He  has his appointment with the urologist in [place] this Friday.? Return in a week.  Orders:  Wound Care Outpatient, *Est. 08/28/19 3:00:00 CDT, *Est. Stop date 08/28/19 3:00:00 CDT, [facility], FU with Physician in 1 week  Wound Care Team Treatment, 08/21/19 12:28:00 CDT, Stop date 08/21/19 12:28:00 CDT, Lor to R heel q 2 days   Problem List/Past Medical History  Ongoing  Chronic skin ulcer  DM (diabetes mellitus)  Open wound of abdomen  Quadriplegia  Quadriplegic spinal paralysis  Historical  No qualifying data  Medications  Bactroban 2% topical ointment, 1 aruna, TOP, TID,? ?Not Taking, Completed Rx  BASAGLAR INJ 100UNIT  Bydureon BCise 2 mg/0.85 mL subcutaneous suspension, extended release  Ciprofloxacin Hcl 500 Mg Tab, 500 mg= 1 tab(s), BID,? ?Not Taking, Completed Rx  clindamycin 150 mg oral capsule, 300 mg= 2 cap(s), Oral, q6hr,? ?Not Taking, Completed Rx  GLIPIZIDE TAB 10MG, 10 mg= 1 tab(s), Oral, BID,? ?Not Taking per Prescriber  JANUVIA TAB 100MG, 100 mg= 1 tab(s), Oral, Daily,? ?Not Taking per Prescriber  Ketoconazole 2% Shampoo,? ?Not Taking, Completed Rx  Levofloxacin 500 Mg Tablet, 500 mg= 1 tab(s), Oral, Daily,? ?Not Taking, Completed Rx  MUPIROCIN OIN 2%,? ?Not taking  MYRBETRIQ TAB 50MG, 50 mg= 1 tab(s), Oral, Daily  OXYBUTYNIN TAB 5MG,? ?Not Taking per Prescriber  ReliOn/NovoLIN R 100 units/mL injectable solution  Tramadol Hcl Tab 50 Mg, 50 mg= 1 tab(s), Oral, q4-6hr  Allergies  No Known Allergies  Social History  Tobacco  Never smoker, 05/01/2017  Health Maintenance  Health Maintenance  ???Pending?(in the next year)  ??? ??OverDue  ??? ? ? ?Alcohol Misuse Screening due??01/01/19??and every 1??year(s)  ??? ? ? ?Obesity Screening due??01/01/19??and every 1??year(s)  ??? ? ? ?ADL Screening due??05/30/19??and every 1??year(s)  ??? ??Due?  ??? ? ? ?Aspirin Therapy for CVD Prevention due??08/21/19??and every 1??year(s)  ??? ? ? ?Tetanus Vaccine due??08/21/19??and every  10??year(s)  ???Satisfied?(in the past 1 year)  ??? ??Satisfied?  ??? ? ? ?Blood Pressure Screening on??08/21/19.??Satisfied by Jolynn Madrid LPN  ?

## 2022-05-04 NOTE — HISTORICAL [facility]
This is a historical note converted from Becca. Formatting and pictures may have been removed.  Please reference Becca for original formatting and attached multimedia. Chief Complaint  C/O recurring pressure ulcer to heel  Rt. buttock, ?ischial ulcer?  History of Present Illness  The patient returns for continued management of?right gluteal?pressure ulcers.? He has a new personal care attendant.  Physical Exam  Vitals & Measurements  T:?36.5? ?C (Oral)? HR:?133(Peripheral)? RR:?18? BP:?141/99? SpO2:?98%?  HT:?172.7?cm? WT:?102?kg?  On examination?the more lateral?right gluteal ulcer has healed.? The?medial gluteal?ulcer?has increased in size and has?fibers?necrotic tissue along the?wound edge.? There is also?increased hemorrhagic?changes along the?margin of the?open wound.? Small amount of serosanguineous drainage is present. ?There is no evidence of obvious infection.  ?  Incision/Wounds  ?1. Right Other: ischial pressure ulcer?- last charted: 10/20/2020 11:28  ?? ??Assessment Done By?- Wound Care Team  ?? ??Pressure Point?- Bony prominence  ?? ??Dressing Type?- ABD dressing pad, Gauze dressing, Honey, Medicated packing strips, Tape  ?? ??Dressing Assessment?- Dry, Intact  ?? ??Dressing Activity?- Changed  ?? ??Cleansing?- Cleaned with normal saline  ?? ??Length?- 0 cm  ?? ??Width?- 0 cm  ?? ??Depth?- 0 cm  ?? ??Wound Bed Tissue Type?- Epithelialized  ?? ??Percent Epithelialized?- 100 %  ?? ??Pressure Ulcer Stage?- III  ?? ??Exudate Amount?- None  ?? ??Exudate Odor?- None  ?? ??Edge?- Well defined  ?? ??Surrounding Tissue Color?- Erythema  ?? ??Surrounding Tissue Condition?- Friable, Intact  ?? ??Status?- Healed  ?  ?2. Buttock Right Inner?- last charted: 10/20/2020 11:28  ?? ??Assessment Done By?- Wound Care Team  ?? ??Pressure Point?- Bony prominence  ?? ??Dressing Type?- ABD dressing pad, Gauze dressing, Honey, Medicated packing strips, Tape  ?? ??Dressing Assessment?- Drainage present, Intact  ?? ??Dressing  Activity?- Changed  ?? ??Cleansing?- Cleaned with normal saline  ?? ??Length?- 4 cm  ?? ??Width?- 3.5 cm  ?? ??Depth?- 0.3 cm  ?? ??Wound Bed Tissue Type?- Ecchymotic, Epithelialized, Friable, Granulating, Moist, Necrotic tissue, slough  ?? ??Pressure Ulcer Stage?- IV  ?? ??Exudate Amount?- Small  ?? ??Exudate Type?- Serosanguineous  ?? ??Exudate Odor?- None  ?? ??Edge?- Poorly defined  ?? ??Surrounding Tissue Color?- Erythema  ?? ??Surrounding Tissue Condition?- Excoriated, Friable  ?? ??Status?- Deteriorating  ?  Assessment/Plan  1.?Pressure ulcer of right ischium?L89.319  ?Today the patient underwent selective debridement of his wound?using scissors and pickups of. ?The?marginal?fibrous necrotic tissue was excised. ?There was no appreciable change in the wound dimensions post?selective debridement. ?Continue wound care modalities as recommended and documented below.  2.?DM (diabetes mellitus)?E11.9  ?Continue current therapy.  Orders:  Wound Care Outpatient, *Est. 11/03/20 3:00:00 CST, *Est. Stop date 11/03/20 3:00:00 CST, [facility], Return to Clinic 2 weeks  Wound Care Team Treatment, 10/20/20 11:44:00 CDT, Stop date 10/20/20 11:44:00 CDT, Apply Mesalt and medical honey to the right ischial wound and change daily to every other day depending on amount of drainage. Continue offloading!   Problem List/Past Medical History  Ongoing  Chronic skin ulcer  DM (diabetes mellitus)  Neurogenic bladder  Obesity  Open wound of abdomen  Pressure ulcer of heel  Pressure ulcer of right ischium  Quadriplegia  Quadriplegic spinal paralysis  Skin eschar  Historical  Pressure ulcer of right foot stage 3  Medications  Bactroban 2% topical ointment, 1 aruna, TOP, TID,? ?Not Taking, Completed Rx  BASAGLAR INJ 100UNIT  Bydureon BCise 2 mg/0.85 mL subcutaneous suspension, extended release,? ?Not Taking per Prescriber  Ciprofloxacin Hcl 500 Mg Tab, 500 mg= 1 tab(s), BID,? ?Not Taking, Completed Rx  clindamycin 150 mg oral  capsule, 300 mg= 2 cap(s), Oral, q6hr,? ?Not Taking, Completed Rx  gentamicin 0.1% topical ointment, 1 aruna, TOP, Daily,? ?Not Taking, Completed Rx  gentamicin 0.1% topical ointment, 1 aruna, TOP, Daily, 2 refills  GLIPIZIDE TAB 10MG, 10 mg= 1 tab(s), Oral, BID,? ?Not Taking per Prescriber  JANUVIA TAB 100MG, 100 mg= 1 tab(s), Oral, Daily,? ?Not Taking per Prescriber  Januvia 50 mg oral tablet, 50 mg= 1 tab(s), Oral, Daily  Ketoconazole 2% Shampoo,? ?Not Taking, Completed Rx  Levofloxacin 500 Mg Tablet, 500 mg= 1 tab(s), Oral, Daily,? ?Not Taking, Completed Rx  MUPIROCIN OIN 2%,? ?Not taking  MYRBETRIQ TAB 50MG, 50 mg= 1 tab(s), Oral, Daily  OXYBUTYNIN TAB 5MG,? ?Not Taking per Prescriber  ReliOn/NovoLIN R 100 units/mL injectable solution  sulfamethoxazole-trimethoprim 800 mg-160 mg oral tablet, 1 tab(s), Oral, BID,? ?Not Taking, Completed Rx  Tramadol Hcl Tab 50 Mg, 50 mg= 1 tab(s), Oral, q4-6hr,? ?Not Taking, Completed Rx  Allergies  No Known Allergies  Social History  Abuse/Neglect  No, 05/19/2020  Tobacco  Never (less than 100 in lifetime), N/A, 05/19/2020  Never smoker, 05/01/2017  Health Maintenance  Health Maintenance  ???Pending?(in the next year)  ??? ??OverDue  ??? ? ? ?Obesity Screening due??01/01/20??and every 1??year(s)  ??? ? ? ?Alcohol Misuse Screening due??01/02/20??and every 1??year(s)  ??? ??Due?  ??? ? ? ?Aspirin Therapy for CVD Prevention due??10/20/20??and every 1??year(s)  ??? ? ? ?Medicare Annual Wellness Exam due??10/20/20??and every 1??year(s)  ??? ? ? ?Tetanus Vaccine due??10/20/20??and every 10??year(s)  ??? ??Due In Future?  ??? ? ? ?ADL Screening not due until??05/19/21??and every 1??year(s)  ???Satisfied?(in the past 1 year)  ??? ??Satisfied?  ??? ? ? ?ADL Screening on??05/19/20.??Satisfied by Jolynn Madrid LPN  ??? ? ? ?Blood Pressure Screening on??10/01/20.??Satisfied by Jolynn Madrid LPN  ?

## 2022-05-04 NOTE — HISTORICAL [facility]
This is a historical note converted from Becca. Formatting and pictures may have been removed.  Please reference Becca for original formatting and attached multimedia. Physical Exam  Vitals & Measurements  T:?36.6? ?C (Oral)? HR:?126(Peripheral)? RR:?18? BP:?119/89? SpO2:?99%?  ?  Assessment/Plan  1.?Infected decubitus ulcer?L89.90  ?  2.?Pressure ulcer of right heel, stage 3?L89.613  Ordered:  Wound Care Outpatient, *Est. 07/13/21 13:00:00 CDT, *Est. Stop date 07/13/21 13:00:00 CDT, [facility], Return to Clinic 2 weeks  Wound Care Team Treatment, 06/29/21 14:19:00 CDT, Stop date 06/29/21 14:19:00 CDT, Apply medical honey Mesalt, gauze ABD pad and Kerlix overlay. Change daily. Use Tubigrip E for management of edema. Continue offloading maneuvers.  ?  3.?Lymphedema of leg?I89.0  Ordered:  Wound Care Outpatient, *Est. 07/13/21 13:00:00 CDT, *Est. Stop date 07/13/21 13:00:00 CDT, [facility], Return to Clinic 2 weeks  Wound Care Team Treatment, 06/29/21 14:19:00 CDT, Stop date 06/29/21 14:19:00 CDT, Apply medical honey Mesalt, gauze ABD pad and Kerlix overlay. Change daily. Use Tubigrip E for management of edema. Continue offloading maneuvers.  ?  4.?Quadriplegia?G82.50  Ordered:  Wound Care Outpatient, *Est. 07/13/21 13:00:00 CDT, *Est. Stop date 07/13/21 13:00:00 CDT, [facility], Return to Clinic 2 weeks  Wound Care Team Treatment, 06/29/21 14:19:00 CDT, Stop date 06/29/21 14:19:00 CDT, Apply medical honey Mesalt, gauze ABD pad and Kerlix overlay. Change daily. Use Tubigrip E for management of edema. Continue offloading maneuvers.  ?   Problem List/Past Medical History  Ongoing  Chronic skin ulcer  DM (diabetes mellitus)  Infected decubitus ulcer  Lymphedema of leg  Neurogenic bladder  Obesity  Open wound of abdomen  Pressure ulcer of heel  Pressure ulcer of right ischium  Quadriplegia  Quadriplegic spinal paralysis  Skin eschar  Historical  Pressure ulcer of right foot stage  3  Medications  Bactroban 2% topical ointment, 1 aruna, TOP, TID,? ?Not Taking, Completed Rx  BASAGLAR INJ 100UNIT  Bydureon BCise 2 mg/0.85 mL subcutaneous suspension, extended release,? ?Not Taking per Prescriber  Ciprofloxacin Hcl 500 Mg Tab, 500 mg= 1 tab(s), BID,? ?Not Taking, Completed Rx  clindamycin 150 mg oral capsule, 300 mg= 2 cap(s), Oral, q6hr,? ?Not Taking, Completed Rx  gentamicin 0.1% topical ointment, 1 aruna, TOP, Daily,? ?Not Taking, Completed Rx  gentamicin 0.1% topical ointment, 1 aruna, TOP, Daily, 2 refills  GLIPIZIDE TAB 10MG, 10 mg= 1 tab(s), Oral, BID,? ?Not Taking per Prescriber  JANUVIA TAB 100MG, 100 mg= 1 tab(s), Oral, Daily,? ?Not Taking per Prescriber  Januvia 50 mg oral tablet, 50 mg= 1 tab(s), Oral, Daily  Ketoconazole 2% Shampoo,? ?Not Taking, Completed Rx  Levofloxacin 500 Mg Tablet, 500 mg= 1 tab(s), Oral, Daily,? ?Not Taking, Completed Rx  MUPIROCIN OIN 2%,? ?Not taking  MYRBETRIQ TAB 50MG, 50 mg= 1 tab(s), Oral, Daily  OXYBUTYNIN TAB 5MG,? ?Not Taking per Prescriber  ReliOn/NovoLIN R 100 units/mL injectable solution  sulfamethoxazole-trimethoprim 800 mg-160 mg oral tablet, 1 tab(s), Oral, BID,? ?Not Taking, Completed Rx  Tramadol Hcl Tab 50 Mg, 50 mg= 1 tab(s), Oral, q4-6hr,? ?Not Taking, Completed Rx  Allergies  No Known Allergies  Social History  Abuse/Neglect  No, 05/19/2020  Tobacco  Never (less than 100 in lifetime), N/A, 05/19/2020  Never smoker, 05/01/2017  Health Maintenance  Health Maintenance  ???Pending?(in the next year)  ??? ??OverDue  ??? ? ? ?Obesity Screening due??01/01/21??and every 1??year(s)  ??? ? ? ?Alcohol Misuse Screening due??01/02/21??and every 1??year(s)  ??? ? ? ?ADL Screening due??05/19/21??and every 1??year(s)  ??? ??Due?  ??? ? ? ?Aspirin Therapy for CVD Prevention due??06/29/21??and every 1??year(s)  ??? ? ? ?Medicare Annual Wellness Exam due??06/29/21??and every 1??year(s)  ??? ? ? ?Tetanus Vaccine due??06/29/21??and every 10??year(s)  ???Satisfied?(in  the past 1 year)  ??? ??Satisfied?  ??? ? ? ?Blood Pressure Screening on??06/29/21.??Satisfied by VERONICA Tim Shawndala  ?

## 2022-05-04 NOTE — HISTORICAL [facility]
This is a historical note converted from Becca. Formatting and pictures may have been removed.  Please reference Becca for original formatting and attached multimedia. Chief Complaint  pressure ulcer right heel  History of Present Illness  See nurses notes  Review of Systems  See nurses notes  Physical Exam  Vitals & Measurements  T:?36.8? ?C (Oral)? HR:?82(Peripheral)? RR:?18? BP:?144/93? SpO2:?96%?  ?  Assessment/Plan  Pressure ulcer of right heel?L89.619  ?Incision/Wounds  ?1. Abdomen Lower, Other: surgical incision?- last charted: 08/07/2019 11:20  ?? ??Assessment Done By?- Wound Care Team  ?? ??Dressing Type?- Gauze dressing, Tape  ?? ??Dressing Assessment?- Drainage present, Intact  ?? ??Dressing Activity?- Changed  ?? ??Cleansing?- Cleaned with normal saline, Irrigated with normal saline  ?? ??Length?- 0.2 cm  ?? ??Width?- 0.2 cm  ?? ??Depth?- 0.1 cm  ?? ??Wound Bed Tissue Type?- Pale Pink  ?? ??Percent Granulated?- 95 %  ?? ??Percent Epithelialized?- 5 %  ?? ??Exudate Amount?- Small  ?? ??Exudate Type?- Serous  ?? ??Exudate Odor?- None  ?? ??Edge?- Well defined  ?? ??Surrounding Tissue Color?- Normal  ?? ??Surrounding Tissue Condition?- Intact  ?  ?2. Foot Right Plantar Blister?- last charted: 08/07/2019 11:20  ?? ??Assessment Done By?- Wound Care Team  ?? ??Pressure Point?- Bony prominence  ?? ??Dressing Type?- Foam, Medicated packing strips  ?? ??Dressing Assessment?- Drainage present, Intact  ?? ??Dressing Activity?- Changed  ?? ??Cleansing?- Cleaned with normal saline  ?? ??Length?- 1.4 cm  ?? ??Width?- 3.3 cm  ?? ??Depth?- 0.1 cm  ?? ??Wound Bed Tissue Type?- Granulating, Necrotic tissue, slough, Pale Pink  ?? ??Percent Granulated?- 85 %  ?? ??Percent Necrotic Tissue Slough?- 15 %  ?? ??Pressure Ulcer Stage?- III  ?? ??Exudate Amount?- Small  ?? ??Exudate Type?- Serous  ?? ??Exudate Odor?- None  ?? ??Edge?- Attached to wound bed  ?? ??Surrounding Tissue Color?- Normal  ?? ??Surrounding Tissue  Condition?- Friable, Intact  ?? ??Status?- Improving  ?He still gets a little drainage from the?vesicocutaneous fistula, but the opening is very hard to see.? The wound bed of the pressure ulcer on his?right heel is clearing with daily Mesalt application. ?Will continue with that plan of care and return in a week. ?He has his appointment to see the reconstructive urologist at [facility] on August 23.  Orders:  Wound Care Outpatient, *Est. 08/14/19 3:00:00 CDT, *Est. Stop date 08/14/19 3:00:00 CDT, [facility], FU with Physician in 1 week  Wound Care Team Treatment, 08/07/19 11:51:00 CDT, Stop date 08/07/19 11:51:00 CDT, Mesalt with cover dressing q day; offload   Problem List/Past Medical History  Ongoing  Chronic skin ulcer  DM (diabetes mellitus)  Open wound of abdomen  Quadriplegia  Quadriplegic spinal paralysis  Historical  No qualifying data  Medications  Bactroban 2% topical ointment, 1 aruna, TOP, TID,? ?Not Taking, Completed Rx  BASAGLAR INJ 100UNIT  Bydureon BCise 2 mg/0.85 mL subcutaneous suspension, extended release  Ciprofloxacin Hcl 500 Mg Tab, 500 mg= 1 tab(s), BID,? ?Not Taking, Completed Rx  clindamycin 150 mg oral capsule, 300 mg= 2 cap(s), Oral, q6hr,? ?Not Taking, Completed Rx  GLIPIZIDE TAB 10MG, 10 mg= 1 tab(s), Oral, BID,? ?Not Taking per Prescriber  JANUVIA TAB 100MG, 100 mg= 1 tab(s), Oral, Daily,? ?Not Taking per Prescriber  Ketoconazole 2% Shampoo,? ?Not Taking, Completed Rx  Levofloxacin 500 Mg Tablet, 500 mg= 1 tab(s), Oral, Daily,? ?Not Taking, Completed Rx  MUPIROCIN OIN 2%,? ?Not taking  MYRBETRIQ TAB 50MG, 50 mg= 1 tab(s), Oral, Daily  OXYBUTYNIN TAB 5MG,? ?Not Taking per Prescriber  ReliOn/NovoLIN R 100 units/mL injectable solution  Tramadol Hcl Tab 50 Mg, 50 mg= 1 tab(s), Oral, q4-6hr  Allergies  No Known Allergies  Social History  Tobacco  Never smoker, 05/01/2017  Health Maintenance  Health Maintenance  ???Pending?(in the next year)  ??? ??OverDue  ??? ? ? ?Alcohol Misuse  Screening due??01/01/19??and every 1??year(s)  ??? ? ? ?Obesity Screening due??01/01/19??and every 1??year(s)  ??? ? ? ?ADL Screening due??05/30/19??and every 1??year(s)  ??? ??Due?  ??? ? ? ?Aspirin Therapy for CVD Prevention due??08/07/19??and every 1??year(s)  ??? ? ? ?Tetanus Vaccine due??08/07/19??and every 10??year(s)  ???Satisfied?(in the past 1 year)  ??? ??Satisfied?  ??? ? ? ?Blood Pressure Screening on??08/07/19.??Satisfied by Jolynn Madrid LPN  ?

## 2022-05-04 NOTE — HISTORICAL [facility]ER
This is a historical note converted from Becca. Formatting and pictures may have been removed.  Please reference Becca for original formatting and attached multimedia. History of Present Illness  Incision/Wounds  ?1. Heel Right Pressure ulcer?- last charted: 11/09/2021 13:06  ?? ??Assessment Done By?- Wound Care Team  ?? ??Pressure Point?- Bony prominence  ?? ??Dressing Type?- ABD dressing pad, Gauze dressing, Medicated packing strips, Rolled Gauze, Tape, Tubular bandage  ?? ??Dressing Assessment?- Drainage present, Intact  ?? ??Dressing Activity?- Changed  ?? ??Cleansing?- Cleaned with normal saline  ?? ??Length?- 3.8 cm  ?? ??Width?- 4.0 cm  ?? ??Depth?- 0.1 cm  ?? ??Wound Bed Tissue Type?- Granulating  ?? ??Pressure Ulcer Stage?- III  ?? ??Exudate Amount?- Moderate  ?? ??Exudate Type?- Serosanguineous  ?? ??Exudate Odor?- None  ?? ??Edge?- Well defined  ?? ??Surrounding Tissue Color?- Normal  ?? ??Surrounding Tissue Condition?- Callus, Intact  ?? ??Status?- Improving  ?  ?2. Toe Right Anterior Trauma?- last charted: 11/09/2021 13:06  ?? ??Assessment Done By?- Wound Care Team  ?? ??Rash Distribution?- Localized  ?? ??Pressure Point?- Bony prominence  ?? ??Dressing Type?- None  ?? ??Cleansing?- Cleaned with normal saline  ?? ??Length?- 0 cm  ?? ??Width?- 0 cm  ?? ??Wound Bed Tissue Type?- Epithelialized  ?? ??Exudate Amount?- None  ?? ??Exudate Odor?- None  ?? ??Edge?- Well defined  ?? ??Surrounding Tissue Color?- Normal  ?? ??Surrounding Tissue Condition?- Callus  ?? ??Status?- Healed  ?  ?3. Foot Right Lateral Pressure ulcer?- last charted: 11/09/2021 13:06  ?? ??Assessment Done By?- Wound Care Team  ?? ??Abnormality Pattern?- Flat  ?? ??Abnormality Color?- Red, Yellow  ?? ??Pressure Point?- Bony prominence  ?? ??Dressing Type?- Foam  ?? ??Dressing Assessment?- Drainage present, Intact  ?? ??Dressing Activity?- Changed  ?? ??Cleansing?- Commercial cleansing solution  ?? ??Length?- 0.9 cm  ?? ??Width?- 0.5  cm  ?? ??Depth?- 0 cm  ?? ??Wound Bed Tissue Type?- Dry, Scab  ?? ??Exudate Amount?- None  ?? ??Exudate Odor?- None  ?? ??Edge?- Well defined  ?? ??Surrounding Tissue Color?- Normal  ?? ??Surrounding Tissue Condition?- Intact  ?? ??Status?- Improving  Today?right toe anterior?wound?is completely healed. ?This is epithelialized well.? The?right foot lateral pressure ulcer?is dry with scabbing this is improved well?and is healed.? Right heel pressure ulcer?had been?treated with Mesalt.? Today the area?is granulating in well.? Is improving.? Area was cleaned?and then collagen and cover dressing was applied.? Tubigrip E double lead was applied to the legs.? Patient will change dressings every other day?and apply collagen cover dressing.? Patient is to return in 2 weeks.  Physical Exam  Vitals & Measurements  T:?36.7? ?C (Oral)? HR:?118(Peripheral)? RR:?18? BP:?148/94? SpO2:?98%?  ?  Assessment/Plan  1.?Pressure ulcer of right heel, stage 3?L89.613  Ordered:  Wound Care Outpatient, *Est. 11/16/21 3:00:00 CST, *Est. Stop date 11/16/21 3:00:00 CST, [facility], FU with Physician in 2 weeks  Wound Care Team Treatment, 11/09/21 13:58:00 CST, Stop date 11/09/21 13:58:00 CST, Every other day clean with normal saline. Right heel with collagen and cover bandage. Tubigrip E double layer. To both extremities. Patient will return in 2 weeks for follow-up.  ?  2.?Lymphedema of leg?I89.0  Ordered:  Wound Care Outpatient, *Est. 11/16/21 3:00:00 CST, *Est. Stop date 11/16/21 3:00:00 CST, [facility], FU with Physician in 2 weeks  Wound Care Team Treatment, 11/09/21 13:58:00 CST, Stop date 11/09/21 13:58:00 CST, Every other day clean with normal saline. Right heel with collagen and cover bandage. Tubigrip E double layer. To both extremities. Patient will return in 2 weeks for follow-up.  ?  3.?Quadriplegia?G82.50  Ordered:  Wound Care Outpatient, *Est. 11/16/21 3:00:00 CST, *Est. Stop date 11/16/21 3:00:00 CST,   [facility], FU with Physician in 2 weeks  Wound Care Team Treatment, 11/09/21 13:58:00 CST, Stop date 11/09/21 13:58:00 CST, Every other day clean with normal saline. Right heel with collagen and cover bandage. Tubigrip E double layer. To both extremities. Patient will return in 2 weeks for follow-up.  ?  4.?Toe ulcer?L97.509  Ordered:  Wound Care Outpatient, *Est. 11/16/21 3:00:00 CST, *Est. Stop date 11/16/21 3:00:00 CST, [facility], FU with Physician in 2 weeks  Wound Care Team Treatment, 11/09/21 13:58:00 CST, Stop date 11/09/21 13:58:00 CST, Every other day clean with normal saline. Right heel with collagen and cover bandage. Tubigrip E double layer. To both extremities. Patient will return in 2 weeks for follow-up.  ?  5.?Pressure ulcer with abrasion, blister, partial thickness skin loss involving epidermis and/or dermis?L89.92  Ordered:  Wound Care Outpatient, *Est. 11/16/21 3:00:00 CST, *Est. Stop date 11/16/21 3:00:00 CST, [facility], FU with Physician in 2 weeks  Wound Care Team Treatment, 11/09/21 13:58:00 CST, Stop date 11/09/21 13:58:00 CST, Every other day clean with normal saline. Right heel with collagen and cover bandage. Tubigrip E double layer. To both extremities. Patient will return in 2 weeks for follow-up.  ?   Problem List/Past Medical History  Ongoing  Chronic skin ulcer  DM (diabetes mellitus)  Infected decubitus ulcer  Lymphedema of leg  Neurogenic bladder  Obesity  Open wound of abdomen  Pressure ulcer of heel  Pressure ulcer of right ischium  Quadriplegia  Quadriplegic spinal paralysis  Skin eschar  Historical  Pressure ulcer of right foot stage 3  Medications  Bactroban 2% topical ointment, 1 aruna, TOP, TID,? ?Not Taking, Completed Rx  BASAGLAR INJ 100UNIT  Bydureon BCise 2 mg/0.85 mL subcutaneous suspension, extended release,? ?Not Taking per Prescriber  Ciprofloxacin Hcl 500 Mg Tab, 500 mg= 1 tab(s), BID,? ?Not Taking, Completed Rx  clindamycin 150 mg oral  capsule, 300 mg= 2 cap(s), Oral, q6hr,? ?Not Taking, Completed Rx  Eliquis Starter Pack for Treatment of DVT and PE 5 mg oral tablet, 5 mg= 1 tab(s), Oral, BID  gentamicin 0.1% topical ointment, 1 aruna, TOP, Daily,? ?Not Taking, Completed Rx  gentamicin 0.1% topical ointment, 1 aruna, TOP, Daily, 2 refills  GLIPIZIDE TAB 10MG, 10 mg= 1 tab(s), Oral, BID,? ?Not Taking per Prescriber  JANUVIA TAB 100MG, 100 mg= 1 tab(s), Oral, Daily,? ?Not Taking per Prescriber  Januvia 50 mg oral tablet, 50 mg= 1 tab(s), Oral, Daily  Ketoconazole 2% Shampoo,? ?Not Taking, Completed Rx  Levofloxacin 500 Mg Tablet, 500 mg= 1 tab(s), Oral, Daily,? ?Not Taking, Completed Rx  metoprolol succinate 50 mg oral tablet, extended release, 50 mg= 1 tab(s), Oral, BID  MUPIROCIN OIN 2%,? ?Not taking  MYRBETRIQ TAB 50MG, 50 mg= 1 tab(s), Oral, Daily  OXYBUTYNIN TAB 5MG,? ?Not Taking per Prescriber  ReliOn/NovoLIN R 100 units/mL injectable solution  sulfamethoxazole-trimethoprim 800 mg-160 mg oral tablet, 1 tab(s), Oral, BID,? ?Not Taking, Completed Rx  Tramadol Hcl Tab 50 Mg, 50 mg= 1 tab(s), Oral, q4-6hr,? ?Not Taking, Completed Rx  Allergies  No Known Allergies  Social History  Abuse/Neglect  No, 05/19/2020  Tobacco  Never (less than 100 in lifetime), N/A, 05/19/2020  Never smoker, 05/01/2017  Health Maintenance  Health Maintenance  ???Pending?(in the next year)  ??? ??OverDue  ??? ? ? ?Obesity Screening due??01/01/21??and every 1??year(s)  ??? ? ? ?Alcohol Misuse Screening due??01/02/21??and every 1??year(s)  ??? ? ? ?ADL Screening due??05/19/21??and every 1??year(s)  ??? ??Due?  ??? ? ? ?Aspirin Therapy for CVD Prevention due??11/09/21??and every 1??year(s)  ??? ? ? ?Medicare Annual Wellness Exam due??11/09/21??and every 1??year(s)  ??? ? ? ?Tetanus Vaccine due??11/09/21??and every 10??year(s)  ???Satisfied?(in the past 1 year)  ??? ??Satisfied?  ??? ? ? ?Blood Pressure Screening on??11/09/21.??Satisfied by Sally MADDOX, Ca Sánchez  ?

## 2022-05-04 NOTE — HISTORICAL [facility]
This is a historical note converted from Becca. Formatting and pictures may have been removed.  Please reference Becca for original formatting and attached multimedia. Physical Exam  Vitals & Measurements  T:?36.8? ?C (Oral)? HR:?134(Peripheral)? RR:?18? BP:?97/58? SpO2:?97%?  ?  Assessment/Plan  1.?Pressure injury of heel, stage 3?L89.603  ?Presents for follow-up for treatment for recurrent pressure ulcer of the right heel his wound is markedly improved with the current dressing protocol?is robust granulation tissue no signs of infection?and is improved with any increased offloading and surveillance will continue hydrogel with OPTi foam continue to offload and follow-up in 1 week  Orders:  Wound Care Outpatient, *Est. 02/19/20 3:00:00 CST, *Est. Stop date 02/19/20 3:00:00 CST, [facility], FU with Physician in 1 week  Wound Care Team Treatment, 02/12/20 12:01:00 CST, Stop date 02/12/20 12:01:00 CST, continue hydrogel with optifoam q od   Problem List/Past Medical History  Ongoing  Chronic skin ulcer  DM (diabetes mellitus)  Open wound of abdomen  Pressure ulcer of right foot stage 3  Quadriplegia  Quadriplegic spinal paralysis  Historical  No qualifying data  Medications  Bactroban 2% topical ointment, 1 aruna, TOP, TID,? ?Not Taking, Completed Rx  BASAGLAR INJ 100UNIT  Bydureon BCise 2 mg/0.85 mL subcutaneous suspension, extended release  Ciprofloxacin Hcl 500 Mg Tab, 500 mg= 1 tab(s), BID,? ?Not Taking, Completed Rx  clindamycin 150 mg oral capsule, 300 mg= 2 cap(s), Oral, q6hr,? ?Not Taking, Completed Rx  GLIPIZIDE TAB 10MG, 10 mg= 1 tab(s), Oral, BID,? ?Not Taking per Prescriber  JANUVIA TAB 100MG, 100 mg= 1 tab(s), Oral, Daily,? ?Not Taking per Prescriber  Ketoconazole 2% Shampoo,? ?Not Taking, Completed Rx  Levofloxacin 500 Mg Tablet, 500 mg= 1 tab(s), Oral, Daily,? ?Not Taking, Completed Rx  MUPIROCIN OIN 2%,? ?Not taking  MYRBETRIQ TAB 50MG, 50 mg= 1 tab(s), Oral, Daily  OXYBUTYNIN TAB 5MG,? ?Not  Taking per Prescriber  ReliOn/NovoLIN R 100 units/mL injectable solution  Tramadol Hcl Tab 50 Mg, 50 mg= 1 tab(s), Oral, q4-6hr  Allergies  No Known Allergies  Social History  Tobacco  Never smoker, 05/01/2017  Health Maintenance  Health Maintenance  ???Pending?(in the next year)  ??? ??OverDue  ??? ? ? ?ADL Screening due??05/30/19??and every 1??year(s)  ??? ??Due?  ??? ? ? ?Alcohol Misuse Screening due??01/01/20??and every 1??year(s)  ??? ? ? ?Obesity Screening due??01/01/20??and every 1??year(s)  ??? ? ? ?Aspirin Therapy for CVD Prevention due??02/12/20??and every 1??year(s)  ??? ? ? ?Medicare Annual Wellness Exam due??02/12/20??and every 1??year(s)  ??? ? ? ?Tetanus Vaccine due??02/12/20??and every 10??year(s)  ???Satisfied?(in the past 1 year)  ??? ??Satisfied?  ??? ? ? ?Blood Pressure Screening on??02/12/20.??Satisfied by Jolynn Madrid LPN  ?

## 2022-05-04 NOTE — HISTORICAL [facility]
This is a historical note converted from Becca. Formatting and pictures may have been removed.  Please reference Becca for original formatting and attached multimedia. History of Present Illness  Patient returns for continued management of a chronic?right heel decubitus ulcer.  Review of Systems  Not significantly different than history of present and past medical illnesses.  Physical Exam  Vitals & Measurements  T:?36.5? ?C (Oral)? HR:?73(Peripheral)? RR:?18? BP:?90/66? SpO2:?97%?  ?  On examination he has?2+ edema involving the right lower extremity most notably affecting the?dorsal foot.? He also has a persistent decubitus ulcer involving the?right posterior heel.? There is?moderate slough intermixed with?granulation tissue.? A small amount of serosanguineous drainage is present. ?There is no evidence of fluctuance.  ?  Incision/Wounds  ?1. Heel Right Pressure ulcer?- last charted: 07/13/2021 13:04  ?? ??Assessment Done By?- Wound Care Team  ?? ??Pressure Point?- Bony prominence  ?? ??Dressing Type?- ABD dressing pad, Gauze dressing, Honey, Medicated packing strips, Rolled Gauze, Tape  ?? ??Dressing Assessment?- Drainage present, Intact  ?? ??Dressing Activity?- Changed  ?? ??Cleansing?- Cleaned with normal saline  ?? ??Length?- 3.5 cm  ?? ??Width?- 6.4 cm  ?? ??Depth?- 0.1 cm  ?? ??Wound Bed Tissue Type?- Granulating, Necrotic tissue, slough, Pale Pink, Scab, Stringy  ?? ??Pressure Ulcer Stage?- III  ?? ??Exudate Amount?- Moderate  ?? ??Exudate Type?- Serous  ?? ??Exudate Odor?- None  ?? ??Edge?- Well defined  ?? ??Surrounding Tissue Color?- Normal  ?? ??Surrounding Tissue Condition?- Dry, Intact  ?? ??Status?- Improving  ?  Assessment/Plan  1.?Pressure ulcer of right heel, stage 3?L89.613  ?Continue current therapy.  2.?Lymphedema of leg?I89.0  ?Continue current therapy.  3.?Quadriplegia?G82.50  ?Frequent turning to assist with?offloading is recommended.   Problem List/Past Medical History  Ongoing  Chronic  skin ulcer  DM (diabetes mellitus)  Infected decubitus ulcer  Lymphedema of leg  Neurogenic bladder  Obesity  Open wound of abdomen  Pressure ulcer of heel  Pressure ulcer of right ischium  Quadriplegia  Quadriplegic spinal paralysis  Skin eschar  Historical  Pressure ulcer of right foot stage 3  Medications  Bactroban 2% topical ointment, 1 aruna, TOP, TID,? ?Not Taking, Completed Rx  BASAGLAR INJ 100UNIT  Bydureon BCise 2 mg/0.85 mL subcutaneous suspension, extended release,? ?Not Taking per Prescriber  Ciprofloxacin Hcl 500 Mg Tab, 500 mg= 1 tab(s), BID,? ?Not Taking, Completed Rx  clindamycin 150 mg oral capsule, 300 mg= 2 cap(s), Oral, q6hr,? ?Not Taking, Completed Rx  gentamicin 0.1% topical ointment, 1 aruna, TOP, Daily,? ?Not Taking, Completed Rx  gentamicin 0.1% topical ointment, 1 aruna, TOP, Daily, 2 refills  GLIPIZIDE TAB 10MG, 10 mg= 1 tab(s), Oral, BID,? ?Not Taking per Prescriber  JANUVIA TAB 100MG, 100 mg= 1 tab(s), Oral, Daily,? ?Not Taking per Prescriber  Januvia 50 mg oral tablet, 50 mg= 1 tab(s), Oral, Daily  Ketoconazole 2% Shampoo,? ?Not Taking, Completed Rx  Levofloxacin 500 Mg Tablet, 500 mg= 1 tab(s), Oral, Daily,? ?Not Taking, Completed Rx  MUPIROCIN OIN 2%,? ?Not taking  MYRBETRIQ TAB 50MG, 50 mg= 1 tab(s), Oral, Daily  OXYBUTYNIN TAB 5MG,? ?Not Taking per Prescriber  ReliOn/NovoLIN R 100 units/mL injectable solution  sulfamethoxazole-trimethoprim 800 mg-160 mg oral tablet, 1 tab(s), Oral, BID,? ?Not Taking, Completed Rx  Tramadol Hcl Tab 50 Mg, 50 mg= 1 tab(s), Oral, q4-6hr,? ?Not Taking, Completed Rx  Allergies  No Known Allergies  Social History  Abuse/Neglect  No, 05/19/2020  Tobacco  Never (less than 100 in lifetime), N/A, 05/19/2020  Never smoker, 05/01/2017  Health Maintenance  Health Maintenance  ???Pending?(in the next year)  ??? ??OverDue  ??? ? ? ?Obesity Screening due??01/01/21??and every 1??year(s)  ??? ? ? ?Alcohol Misuse Screening due??01/02/21??and every 1??year(s)  ??? ? ? ?ADL  Screening due??05/19/21??and every 1??year(s)  ??? ??Due?  ??? ? ? ?Aspirin Therapy for CVD Prevention due??07/13/21??and every 1??year(s)  ??? ? ? ?Medicare Annual Wellness Exam due??07/13/21??and every 1??year(s)  ??? ? ? ?Tetanus Vaccine due??07/13/21??and every 10??year(s)  ???Satisfied?(in the past 1 year)  ??? ??Satisfied?  ??? ? ? ?Blood Pressure Screening on??07/13/21.??Satisfied by VERONICA Tim Shawndala  ?

## 2022-05-04 NOTE — HISTORICAL [facility]
This is a historical note converted from Becca. Formatting and pictures may have been removed.  Please reference Becca for original formatting and attached multimedia. Chief Complaint  C/O recurring pressure ulcer to heel  Rt. buttock, ?ischial ulcer?  History of Present Illness  see nurse notes  Review of Systems  see nurse notes  Physical Exam  Vitals & Measurements  T:?37.0? ?C (Oral)? HR:?132(Peripheral)? RR:?18? BP:?142/90? SpO2:?98%?  HT:?172.7?cm? WT:?102?kg?  see nurse notes  Assessment/Plan  1.?Pressure ulcer of right ischium?L89.319  ?Incision/Wounds  ?1. Right Other: ischial pressure ulcer?- last charted: 09/15/2020 11:29  ?? ??Assessment Done By?- Wound Care Team  ?? ??Pressure Point?- Bony prominence  ?? ??Dressing Type?- Absorptive, Collagen, Gauze dressing, Tape  ?? ??Dressing Assessment?- Drainage present, Intact  ?? ??Dressing Activity?- Changed  ?? ??Cleansing?- Cleaned with normal saline  ?? ??Length?- 0.9 cm  ?? ??Width?- 0.6 cm  ?? ??Depth?- 0.3 cm  ?? ??Wound Bed Tissue Type?- Friable, Granulating  ?? ??Percent Granulated?- 100 %  ?? ??Pressure Ulcer Stage?- III  ?? ??Undermining Location?- 12-1 oclock  ?? ??Undermining Depth?- 1.3  ?? ??Exudate Amount?- Small  ?? ??Exudate Type?- Serosanguineous  ?? ??Exudate Odor?- None  ?? ??Edge?- Well defined  ?? ??Surrounding Tissue Color?- Erythema  ?? ??Surrounding Tissue Condition?- Friable, Intact  ?? ??Status?- Improving  ?  ?2. Buttock Right Inner?- last charted: 09/15/2020 11:29  ?? ??Assessment Done By?- Wound Care Team  ?? ??Pressure Point?- None  ?? ??Dressing Type?- Absorptive, Collagen, Tape  ?? ??Dressing Assessment?- Drainage present, Intact  ?? ??Dressing Activity?- Removed  ?? ??Cleansing?- Cleaned with normal saline  ?? ??Length?- 0.9 cm  ?? ??Width?- 0.3 cm  ?? ??Depth?- 0.1 cm  ?? ??Wound Bed Tissue Type?- Epithelialized, Erythema, Friable, Granulating  ?? ??Pressure Ulcer Stage?- Unstageable  ?? ??Exudate Amount?- Moderate  ??  ??Exudate Type?- Serosanguineous  ?? ??Exudate Odor?- None  ?? ??Edge?- Poorly defined  ?? ??Surrounding Tissue Color?- Erythema  ?? ??Surrounding Tissue Condition?- Excoriated, Friable  ?? ??Status?- Improving  ?Pt examined and notes review above. Wound is healing and responding to current tx. RTC in 1 week and continue collagen for open areas.  2.?DM (diabetes mellitus)?E11.9  Orders:  Wound Care Outpatient, *Est. 10/08/20 3:00:00 CDT, *Est. Stop date 10/08/20 3:00:00 CDT, [facility], FU with Physician in 1 week  Wound Care Team Treatment, 10/01/20 15:56:00 CDT, Stop date 10/01/20 15:56:00 CDT, applied collagen to both open areas,skin barrier spray apply to periwound area. RTC in 2 weeks.   Problem List/Past Medical History  Ongoing  Chronic skin ulcer  DM (diabetes mellitus)  Neurogenic bladder  Obesity  Open wound of abdomen  Pressure ulcer of heel  Pressure ulcer of right ischium  Quadriplegia  Quadriplegic spinal paralysis  Skin eschar  Historical  Pressure ulcer of right foot stage 3  Medications  Bactroban 2% topical ointment, 1 aruna, TOP, TID,? ?Not Taking, Completed Rx  BASAGLAR INJ 100UNIT  Bydureon BCise 2 mg/0.85 mL subcutaneous suspension, extended release,? ?Not Taking per Prescriber  Ciprofloxacin Hcl 500 Mg Tab, 500 mg= 1 tab(s), BID,? ?Not Taking, Completed Rx  clindamycin 150 mg oral capsule, 300 mg= 2 cap(s), Oral, q6hr,? ?Not Taking, Completed Rx  gentamicin 0.1% topical ointment, 1 aruna, TOP, Daily,? ?Not Taking, Completed Rx  gentamicin 0.1% topical ointment, 1 aruna, TOP, Daily, 2 refills  GLIPIZIDE TAB 10MG, 10 mg= 1 tab(s), Oral, BID,? ?Not Taking per Prescriber  JANUVIA TAB 100MG, 100 mg= 1 tab(s), Oral, Daily,? ?Not Taking per Prescriber  Januvia 50 mg oral tablet, 50 mg= 1 tab(s), Oral, Daily  Ketoconazole 2% Shampoo,? ?Not Taking, Completed Rx  Levofloxacin 500 Mg Tablet, 500 mg= 1 tab(s), Oral, Daily,? ?Not Taking, Completed Rx  MUPIROCIN OIN 2%,? ?Not taking  MYRBETRIQ TAB 50MG,  50 mg= 1 tab(s), Oral, Daily  OXYBUTYNIN TAB 5MG,? ?Not Taking per Prescriber  ReliOn/NovoLIN R 100 units/mL injectable solution  sulfamethoxazole-trimethoprim 800 mg-160 mg oral tablet, 1 tab(s), Oral, BID,? ?Not Taking, Completed Rx  Tramadol Hcl Tab 50 Mg, 50 mg= 1 tab(s), Oral, q4-6hr,? ?Not Taking, Completed Rx  Allergies  No Known Allergies  Social History  Abuse/Neglect  No, 05/19/2020  Tobacco  Never (less than 100 in lifetime), N/A, 05/19/2020  Never smoker, 05/01/2017  Health Maintenance  Health Maintenance  ???Pending?(in the next year)  ??? ??OverDue  ??? ? ? ?Obesity Screening due??01/01/20??and every 1??year(s)  ??? ? ? ?Alcohol Misuse Screening due??01/02/20??and every 1??year(s)  ??? ??Due?  ??? ? ? ?Aspirin Therapy for CVD Prevention due??10/01/20??and every 1??year(s)  ??? ? ? ?Medicare Annual Wellness Exam due??10/01/20??and every 1??year(s)  ??? ? ? ?Tetanus Vaccine due??10/01/20??and every 10??year(s)  ??? ??Due In Future?  ??? ? ? ?ADL Screening not due until??05/19/21??and every 1??year(s)  ???Satisfied?(in the past 1 year)  ??? ??Satisfied?  ??? ? ? ?ADL Screening on??05/19/20.??Satisfied by Jolynn Madrid LPN  ??? ? ? ?Blood Pressure Screening on??09/15/20.??Satisfied by Jolynn Madrid LPN  ?

## 2022-05-04 NOTE — HISTORICAL [facility] CERNER
This is a historical note converted from Becca. Formatting and pictures may have been removed.  Please reference Becca for original formatting and attached multimedia. History of Present Illness  Returns for continued management of?an infected right heel decubitus?ulcer.? He has completed topical gentamicin?and currently is using debridement program consisting of?Mesalt.? He also uses?Tubigrip F for?compression.  Review of Systems  Not significantly different than the history of present and past medical illnesses.  Physical Exam  Vitals & Measurements  T:?36.6? ?C (Oral)? HR:?126(Peripheral)? RR:?18? BP:?119/89? SpO2:?99%?  ?  On examination the patient has?a large?right posterior heel?ulcer?with approximately?60% granulation tissue?and?40%?slough and eschar.? A scant serosanguineous drainage is present. ?There is no evidence of infection.? He has?2+?edema involving the?right foot?without pitting.? There is no significant erythema.  ?  Incision/Wounds  ?1. Heel Right Pressure ulcer?- last charted: 06/29/2021 13:58  ?? ??Assessment Done By?- Wound Care Team  ?? ??Pressure Point?- Bony prominence  ?? ??Dressing Type?- ABD dressing pad, Gauze dressing, Honey, Medicated packing strips, Rolled Gauze, Tape  ?? ??Dressing Assessment?- Drainage present, Intact  ?? ??Dressing Activity?- Changed  ?? ??Cleansing?- Cleaned with normal saline  ?? ??Length?- 4.0 cm  ?? ??Width?- 6.5 cm  ?? ??Depth?- 0.1 cm  ?? ??Wound Bed Tissue Type?- Granulating, Necrotic tissue, eschar, Necrotic tissue, slough, Pale Pink, Scab  ?? ??Pressure Ulcer Stage?- III  ?? ??Exudate Amount?- Moderate  ?? ??Exudate Type?- Serosanguineous  ?? ??Exudate Odor?- None  ?? ??Edge?- Well defined  ?? ??Surrounding Tissue Color?- Pale  ?? ??Surrounding Tissue Condition?- Dry, Intact  ?  Assessment/Plan  1.?Infected decubitus ulcer?L89.90  ?Resolved.  2.?Pressure ulcer of right heel, stage 3?L89.613  ?Continue?wound care as recommended and  documented?below.  3.?Lymphedema of leg?I89.0  ?Changed to Tubigrip?E for more effective compression.  4.?Quadriplegia?G82.50  ?Continue?offloading maneuvers.   Problem List/Past Medical History  Ongoing  Chronic skin ulcer  DM (diabetes mellitus)  Infected decubitus ulcer  Lymphedema of leg  Neurogenic bladder  Obesity  Open wound of abdomen  Pressure ulcer of heel  Pressure ulcer of right ischium  Quadriplegia  Quadriplegic spinal paralysis  Skin eschar  Historical  Pressure ulcer of right foot stage 3  Medications  Bactroban 2% topical ointment, 1 aruna, TOP, TID,? ?Not Taking, Completed Rx  BASAGLAR INJ 100UNIT  Bydureon BCise 2 mg/0.85 mL subcutaneous suspension, extended release,? ?Not Taking per Prescriber  Ciprofloxacin Hcl 500 Mg Tab, 500 mg= 1 tab(s), BID,? ?Not Taking, Completed Rx  clindamycin 150 mg oral capsule, 300 mg= 2 cap(s), Oral, q6hr,? ?Not Taking, Completed Rx  gentamicin 0.1% topical ointment, 1 aruna, TOP, Daily,? ?Not Taking, Completed Rx  gentamicin 0.1% topical ointment, 1 aruna, TOP, Daily, 2 refills  GLIPIZIDE TAB 10MG, 10 mg= 1 tab(s), Oral, BID,? ?Not Taking per Prescriber  JANUVIA TAB 100MG, 100 mg= 1 tab(s), Oral, Daily,? ?Not Taking per Prescriber  Januvia 50 mg oral tablet, 50 mg= 1 tab(s), Oral, Daily  Ketoconazole 2% Shampoo,? ?Not Taking, Completed Rx  Levofloxacin 500 Mg Tablet, 500 mg= 1 tab(s), Oral, Daily,? ?Not Taking, Completed Rx  MUPIROCIN OIN 2%,? ?Not taking  MYRBETRIQ TAB 50MG, 50 mg= 1 tab(s), Oral, Daily  OXYBUTYNIN TAB 5MG,? ?Not Taking per Prescriber  ReliOn/NovoLIN R 100 units/mL injectable solution  sulfamethoxazole-trimethoprim 800 mg-160 mg oral tablet, 1 tab(s), Oral, BID,? ?Not Taking, Completed Rx  Tramadol Hcl Tab 50 Mg, 50 mg= 1 tab(s), Oral, q4-6hr,? ?Not Taking, Completed Rx  Allergies  No Known Allergies  Social History  Abuse/Neglect  No, 05/19/2020  Tobacco  Never (less than 100 in lifetime), N/A, 05/19/2020  Never smoker, 05/01/2017  Health  Maintenance  Health Maintenance  ???Pending?(in the next year)  ??? ??OverDue  ??? ? ? ?Obesity Screening due??01/01/21??and every 1??year(s)  ??? ? ? ?Alcohol Misuse Screening due??01/02/21??and every 1??year(s)  ??? ? ? ?ADL Screening due??05/19/21??and every 1??year(s)  ??? ??Due?  ??? ? ? ?Aspirin Therapy for CVD Prevention due??06/29/21??and every 1??year(s)  ??? ? ? ?Medicare Annual Wellness Exam due??06/29/21??and every 1??year(s)  ??? ? ? ?Tetanus Vaccine due??06/29/21??and every 10??year(s)  ???Satisfied?(in the past 1 year)  ??? ??Satisfied?  ??? ? ? ?Blood Pressure Screening on??06/29/21.??Satisfied by VERONICA Tim Shawndala  ?

## 2022-05-04 NOTE — HISTORICAL [facility] CERNER
This is a historical note converted from Becca. Formatting and pictures may have been removed.  Please reference Becca for original formatting and attached multimedia. Chief Complaint  C/O recurring pressure ulcer to heel  Rt. buttock, ?ischial ulcer?  History of Present Illness  The patient is using?Aquaphor to his?gluteal dermatitic area?and continues to practice?offloading maneuvers.? Is also using OptiForm to his?heel wound.  Review of Systems  Not significantly different than the history of present and past medical illnesses.  Physical Exam  Vitals & Measurements  T:?36.8? ?C (Oral)? HR:?129(Peripheral)? RR:?18? BP:?89/63? SpO2:?98%?  HT:?172.7?cm? WT:?102?kg?  On examination he has no open wounds on his gluteal area.? There is a small amount of fibrous?tissue?and hypotrophic?skin changes are?present.? Inspection of his?right heel reveals?a small residual ulcer with moderate?slough?and?moderate periwound hyper callus tissue. ?There is no evidence of infection.  ?  Incision/Wounds  ?1. Buttock Right Inner?- last charted: 01/26/2021 11:28  ?? ??Assessment Done By?- Wound Care Team  ?? ??Pressure Point?- Bony prominence  ?? ??Dressing Type?- ABD dressing pad, Gauze dressing, Tape  ?? ??Dressing Assessment?- Dry, Intact  ?? ??Dressing Activity?- Changed  ?? ??Cleansing?- Cleaned with normal saline  ?? ??Length?- 3 cm  ?? ??Width?- 5 cm  ?? ??Depth?- 0.1 cm  ?? ??Wound Bed Tissue Type?- Dry, Epithelialized, Friable, Scab  ?? ??Percent Epithelialized?- 100 %  ?? ??Pressure Ulcer Stage?- IV  ?? ??Exudate Amount?- None  ?? ??Exudate Odor?- None  ?? ??Edge?- Poorly defined  ?? ??Surrounding Tissue Color?- Normal  ?? ??Surrounding Tissue Condition?- Dry, Excoriated  ?? ??Status?- Unchanged  ?  ?2. Heel Right Pressure ulcer?- last charted: 01/26/2021 11:28  ?? ??Assessment Done By?- Wound Care Team  ?? ??Pressure Point?- Bony prominence  ?? ??Dressing Type?- Collagen, Gauze dressing, Tape  ?? ??Dressing Assessment?-  Drainage present, Intact  ?? ??Dressing Activity?- Removed  ?? ??Cleansing?- Cleaned with normal saline  ?? ??Length?- 0.3 cm  ?? ??Width?- 0.6 cm  ?? ??Depth?- 0.1 cm  ?? ??Wound Bed Tissue Type?- Granulating, Pale Pink  ?? ??Exudate Amount?- None  ?? ??Exudate Odor?- None  ?? ??Edge?- Well defined  ?? ??Surrounding Tissue Color?- Normal  ?? ??Surrounding Tissue Condition?- Intact  ?  Assessment/Plan  1.?Pressure ulcer of right ischium?L89.319  ?Continue current therapy.  Ordered:  Wound Care Outpatient, *Est. 02/23/21 3:00:00 CST, *Est. Stop date 02/23/21 3:00:00 CST, [facility], Return to Clinic 2 weeks  Wound Care Team Treatment, 02/09/21 13:56:00 CST, Stop date 02/09/21 13:56:00 CST, Apply Aquaphor and absorptive dressing to right buttock wound and change daily. Use medical honey and Mesalt to right heel wound every other day for 1 week and then resume Mepilex Ag foam.  ?  2.?Pressure ulcer of heel?L89.609  ?Initiate use of?Mesalt and medical honey?which will be changed every other day?for 1 week.? Thereafter resume application of OPTi foam.  Ordered:  Wound Care Outpatient, *Est. 02/23/21 3:00:00 CST, *Est. Stop date 02/23/21 3:00:00 CST, [facility], Return to Clinic 2 weeks  ?  3.?DM (diabetes mellitus)?E11.9  Continue current therapy.  Ordered:  Wound Care Outpatient, *Est. 02/23/21 3:00:00 CST, *Est. Stop date 02/23/21 3:00:00 CST, [facility], Return to Clinic 2 weeks  ?   Problem List/Past Medical History  Ongoing  Chronic skin ulcer  DM (diabetes mellitus)  Neurogenic bladder  Obesity  Open wound of abdomen  Pressure ulcer of heel  Pressure ulcer of right ischium  Quadriplegia  Quadriplegic spinal paralysis  Skin eschar  Historical  Pressure ulcer of right foot stage 3  Medications  Bactroban 2% topical ointment, 1 aruna, TOP, TID,? ?Not Taking, Completed Rx  BASAGLAR INJ 100UNIT  Bydureon BCise 2 mg/0.85 mL subcutaneous suspension, extended release,? ?Not Taking per  Prescriber  Ciprofloxacin Hcl 500 Mg Tab, 500 mg= 1 tab(s), BID,? ?Not Taking, Completed Rx  clindamycin 150 mg oral capsule, 300 mg= 2 cap(s), Oral, q6hr,? ?Not Taking, Completed Rx  gentamicin 0.1% topical ointment, 1 aruna, TOP, Daily,? ?Not Taking, Completed Rx  gentamicin 0.1% topical ointment, 1 aruna, TOP, Daily, 2 refills  GLIPIZIDE TAB 10MG, 10 mg= 1 tab(s), Oral, BID,? ?Not Taking per Prescriber  JANUVIA TAB 100MG, 100 mg= 1 tab(s), Oral, Daily,? ?Not Taking per Prescriber  Januvia 50 mg oral tablet, 50 mg= 1 tab(s), Oral, Daily  Ketoconazole 2% Shampoo,? ?Not Taking, Completed Rx  Levofloxacin 500 Mg Tablet, 500 mg= 1 tab(s), Oral, Daily,? ?Not Taking, Completed Rx  MUPIROCIN OIN 2%,? ?Not taking  MYRBETRIQ TAB 50MG, 50 mg= 1 tab(s), Oral, Daily  OXYBUTYNIN TAB 5MG,? ?Not Taking per Prescriber  ReliOn/NovoLIN R 100 units/mL injectable solution  sulfamethoxazole-trimethoprim 800 mg-160 mg oral tablet, 1 tab(s), Oral, BID,? ?Not Taking, Completed Rx  Tramadol Hcl Tab 50 Mg, 50 mg= 1 tab(s), Oral, q4-6hr,? ?Not Taking, Completed Rx  Allergies  No Known Allergies  Social History  Abuse/Neglect  No, 05/19/2020  Tobacco  Never (less than 100 in lifetime), N/A, 05/19/2020  Never smoker, 05/01/2017  Health Maintenance  Health Maintenance  ???Pending?(in the next year)  ??? ??Due?  ??? ? ? ?Obesity Screening due??01/01/21??and every 1??year(s)  ??? ? ? ?Alcohol Misuse Screening due??01/02/21??and every 1??year(s)  ??? ? ? ?Aspirin Therapy for CVD Prevention due??02/09/21??and every 1??year(s)  ??? ? ? ?Medicare Annual Wellness Exam due??02/09/21??and every 1??year(s)  ??? ? ? ?Tetanus Vaccine due??02/09/21??and every 10??year(s)  ??? ??Due In Future?  ??? ? ? ?ADL Screening not due until??05/19/21??and every 1??year(s)  ???Satisfied?(in the past 1 year)  ??? ??Satisfied?  ??? ? ? ?ADL Screening on??05/19/20.??Satisfied by Jolynn Madrid LPN  ??? ? ? ?Blood Pressure Screening on??01/26/21.??Satisfied by  Ophelia Casas.  ?

## 2022-05-04 NOTE — HISTORICAL [facility] CERNER
This is a historical note converted from Becca. Formatting and pictures may have been removed.  Please reference Becca for original formatting and attached multimedia. History of Present Illness  see nurses notes  Review of Systems  see nurses notes  Physical Exam  Vitals & Measurements  T:?36.6? ?C (Oral)? HR:?133(Peripheral)? RR:?18? BP:?108/74? SpO2:?99%?  ?  Assessment/Plan  1.?Pressure injury of heel, stage 3?L89.603  ?Incision/Wounds  ?1. Heel Right Pressure ulcer?- last charted: 02/20/2020 11:12  ?? ??Assessment Done By?- Wound Care Team  ?? ??Pressure Point?- Bony prominence  ?? ??Dressing Type?- Foam, Hydrogel  ?? ??Dressing Assessment?- Drainage present, Intact  ?? ??Dressing Activity?- Changed  ?? ??Cleansing?- Cleaned with normal saline  ?? ??Length?- 0.2 cm  ?? ??Width?- 0.3 cm  ?? ??Depth?- 0.1 cm  ?? ??Wound Bed Tissue Type?- Epithelialized, Pale Pink  ?? ??Percent Granulated?- 5 %  ?? ??Percent Epithelialized?- 95 %  ?? ??Pressure Ulcer Stage?- III  ?? ??Exudate Amount?- Scant  ?? ??Exudate Type?- Serous  ?? ??Exudate Odor?- None  ?? ??Edge?- Attached to wound bed  ?? ??Surrounding Tissue Color?- Normal  ?? ??Surrounding Tissue Condition?- Edematous, Intact  ?? ??Status?- Improving  ?pt examined and notes review. wound is healed. RTC PRN.  Orders:  Wound Care Team Treatment, 03/05/20 11:29:00 CST, Stop date 03/05/20 11:29:00 CST, pt wound is healed. Discharge from clinic   Problem List/Past Medical History  Ongoing  Chronic skin ulcer  DM (diabetes mellitus)  Open wound of abdomen  Pressure ulcer of right foot stage 3  Quadriplegia  Quadriplegic spinal paralysis  Historical  No qualifying data  Medications  Bactroban 2% topical ointment, 1 aruna, TOP, TID,? ?Not Taking, Completed Rx  BASAGLAR INJ 100UNIT  Bydureon BCise 2 mg/0.85 mL subcutaneous suspension, extended release  Ciprofloxacin Hcl 500 Mg Tab, 500 mg= 1 tab(s), BID,? ?Not Taking, Completed Rx  clindamycin 150 mg oral capsule, 300 mg= 2  cap(s), Oral, q6hr,? ?Not Taking, Completed Rx  GLIPIZIDE TAB 10MG, 10 mg= 1 tab(s), Oral, BID,? ?Not Taking per Prescriber  JANUVIA TAB 100MG, 100 mg= 1 tab(s), Oral, Daily,? ?Not Taking per Prescriber  Ketoconazole 2% Shampoo,? ?Not Taking, Completed Rx  Levofloxacin 500 Mg Tablet, 500 mg= 1 tab(s), Oral, Daily,? ?Not Taking, Completed Rx  MUPIROCIN OIN 2%,? ?Not taking  MYRBETRIQ TAB 50MG, 50 mg= 1 tab(s), Oral, Daily  OXYBUTYNIN TAB 5MG,? ?Not Taking per Prescriber  ReliOn/NovoLIN R 100 units/mL injectable solution  Tramadol Hcl Tab 50 Mg, 50 mg= 1 tab(s), Oral, q4-6hr  Allergies  No Known Allergies  Social History  Tobacco  Never smoker, 05/01/2017  Health Maintenance  Health Maintenance  ???Pending?(in the next year)  ??? ??OverDue  ??? ? ? ?ADL Screening due??05/30/19??and every 1??year(s)  ??? ? ? ?Alcohol Misuse Screening due??01/01/20??and every 1??year(s)  ??? ? ? ?Obesity Screening due??01/01/20??and every 1??year(s)  ??? ??Due?  ??? ? ? ?Aspirin Therapy for CVD Prevention due??03/05/20??and every 1??year(s)  ??? ? ? ?Medicare Annual Wellness Exam due??03/05/20??and every 1??year(s)  ??? ? ? ?Tetanus Vaccine due??03/05/20??and every 10??year(s)  ???Satisfied?(in the past 1 year)  ??? ??Satisfied?  ??? ? ? ?Blood Pressure Screening on??02/20/20.??Satisfied by Jolynn Madrid LPN  ?

## 2022-05-04 NOTE — HISTORICAL [facility] CERNER
This is a historical note converted from Becca. Formatting and pictures may have been removed.  Please reference Becca for original formatting and attached multimedia. History of Present Illness  Mr. Galvan has been following wound instructions as previously prescribed.? His abdominal wound has been packed with Mesalt.? He denies any difficulty with drainage.  Physical Exam  Vitals & Measurements  T:?36.4? ?C (Oral)? HR:?72(Peripheral)? RR:?16? BP:?147/96?  ?  The patients suprapubic skin fistula has a depth of approximately 2.5 cm.? A small amount of bloody drainage was present?following probing of the tract.? Periwound areas is?clean and without induration.? The wound on his left ankle has a small amount of slough interspersed with good granulation tissue.? The periwound areas clean.  Assessment/Plan  1.?Persistent postprocedural fistula, subsequent encounter  ? The?suprapubic?fistula skin tract?was cauterized with silver nitrate.? Tolerated procedure well.  Ordered:  Wound Care Team Treatment, 06/26/18 12:19:00 CDT, Stop date 06/26/18 12:19:00 CDT, Follow-up in 2 weeks area not pack the abdominal wound. Apply Mesalt to the left ankle wound daily as instructed.  ?  2.?Other complications of procedures, not elsewhere classified, subsequent encounter  ? As noted above  Ordered:  Wound Care Team Treatment, 06/26/18 12:19:00 CDT, Stop date 06/26/18 12:19:00 CDT, Follow-up in 2 weeks area not pack the abdominal wound. Apply Mesalt to the left ankle wound daily as instructed.  ?  Orders:  Wound Care Outpatient, *Est. 07/03/18 3:00:00 CDT, *Est. Stop date 07/03/18 3:00:00 CDT, [facility], FU with Physician in 1 week   Problem List/Past Medical History  Ongoing  DM (diabetes mellitus)  Quadriplegia  Quadriplegic spinal paralysis  Historical  No qualifying data  Medications  Bactroban 2% topical ointment, 1 aruna, TOP, TID,? ?Not Taking, Completed Rx  clindamycin 150 mg oral capsule, 300 mg= 2 cap(s), Oral,  q6hr,? ?Not Taking, Completed Rx  GLIPIZIDE TAB 10MG, 10 mg= 1 tab(s), Oral, BID  JANUVIA TAB 100MG, 100 mg= 1 tab(s), Oral, Daily  Ketoconazole 2% Shampoo,? ?Not Taking, Completed Rx  Levofloxacin 500 Mg Tablet, 500 mg= 1 tab(s), Oral, Daily  MUPIROCIN OIN 2%,? ?Not taking  MYRBETRIQ TAB 50MG, 50 mg= 1 tab(s), Oral, Daily  OXYBUTYNIN TAB 5MG,? ?Not Taking per Prescriber  Tramadol Hcl Tab 50 Mg, 50 mg= 1 tab(s), Oral, q4-6hr  Allergies  No Known Allergies  Social History  Tobacco  Never smoker, 05/11/2017  Health Maintenance  Health Maintenance  ???Pending?(in the next year)  ??? ??OverDue  ??? ? ? ?Lipid Screening due??08/13/16??and every 1??year(s)  ??? ??Due?  ??? ? ? ?Alcohol Misuse Screening due??06/26/18??and every 1??year(s)  ??? ? ? ?Aspirin Therapy for CVD Prevention due??06/26/18??and every 1??year(s)  ??? ? ? ?Body Mass Index Check due??06/26/18??and every 1??year(s)  ??? ? ? ?Colorectal Screening due??06/26/18??Variable frequency  ??? ? ? ?Depression Screening due??06/26/18??and every 1??year(s)  ??? ? ? ?Obesity Screening due??06/26/18??and every 1??year(s)  ??? ? ? ?Tetanus Vaccine due??06/26/18??and every 10??year(s)  ??? ??Due In Future?  ??? ? ? ?Influenza Vaccine not due until??10/02/18??and every 1??year(s)  ??? ? ? ?Diabetes Screening not due until??01/12/19??and every 3??year(s)  ??? ? ? ?Tobacco Use Screening not due until??05/30/19??and every 1??year(s)  ???Satisfied?(in the past 1 year)  ??? ??Satisfied?  ??? ? ? ?Blood Pressure Screening on??06/26/18.??Satisfied by Nohemi Madrid LPN  ??? ? ? ?Tobacco Use Screening on??05/30/18.??Satisfied by Nohemi Madrid LPN  ?  ?

## 2022-05-04 NOTE — HISTORICAL [facility]ERNER
This is a historical note converted from Becca. Formatting and pictures may have been removed.  Please reference Becca for original formatting and attached multimedia. History of Present Illness  The patient returns for continued management of a right heel decubitus ulcer?which was debrided 1 week ago.? He is currently on?ciprofloxacin antibiotic therapy.? He denies fever or chills.? Reportedly he has been compliant in?offloading maneuvers as recommended.  Review of Systems  Not significantly different than history of present and past medical illnesses.  Physical Exam  Vitals & Measurements  T:?36.5? ?C (Oral)? HR:?125(Peripheral)? RR:?18? BP:?112/78? SpO2:?98%?  ?  On examination the patient has?a right heel decubitus ulcer with?40%?eschar and approximately?20%?slough?with 40%?epithelial tissue.  ?  Incision/Wounds  ?1. Heel Right Pressure ulcer?- last charted: 06/15/2021 13:11  ?? ??Assessment Done By?- Wound Care Team  ?? ??Pressure Point?- Bony prominence  ?? ??Dressing Type?- ABD dressing pad, Foam, Gauze dressing, Honey, Medicated packing strips, Rolled Gauze, Tape  ?? ??Dressing Activity?- Applied  ?? ??Cleansing?- Cleaned with normal saline  ?? ??Length?- 4.4 cm  ?? ??Width?- 7.7 cm  ?? ??Depth?- 0.1 cm  ?? ??Wound Bed Tissue Type?- Granulating, Necrotic tissue, eschar, Necrotic tissue, slough, Pale Pink, Scab  ?? ??Pressure Ulcer Stage?- III  ?? ??Exudate Amount?- Moderate  ?? ??Exudate Type?- Serosanguineous  ?? ??Exudate Odor?- None  ?? ??Edge?- Well defined  ?? ??Surrounding Tissue Color?- Pale  ?? ??Surrounding Tissue Condition?- Intact, Maceration  ?? ??Topical Agent Application?- Skin Prep  ?   Laboratory data:?Wound culture and sensitivity (6/8/2021)?reveals Enterococcus?faecalis?with?sensitivity to?Augmentin,?penicillin and?gentamicin.  ?  ?  Assessment/Plan  1.?Infected decubitus ulcer?L89.90  ?There has been?progression of the depth of the decubitus ulcer?with?densely adherent?eschar?covering  approximately 50% of the wound.? The patient was advised that?excisional debridement was indicated. ?Following informed consent?the eschar was?excised. ?The debridement was carried down through the subcutaneous tissue down to?muscle.? There was evidence of?old subcutaneous?blood?and?and?mixture of of?seropurulent?content.? There was moderate bleeding which was controlled with pressure.? The wound was packed with?Mesalt and gentamicin.??The postoperative wound measured 4.5 cm x 7.7 cm x 0.3 cm. ?He and his caregiver were advised to leave the packing in place for?48 to 72 hours at which time it will be removed?and the wound will be repacked with?Mesalt.? Augmentin will be instituted.  Ordered:  Tissue Culture - Aerobic, Routine collect, 06/15/21 13:48:00 CDT, Order for future visit, Tissue, Heel, Nurse collect, Stop date 06/15/21 13:48:00 CDT, Pressure ulcer of right heel, stage 3  Quadriplegia  Infected decubitus ulcer  Wound Care Outpatient, *Est. 06/22/21 3:00:00 CDT, *Est. Stop date 06/22/21 3:00:00 CDT, [facility], FU with Physician in 1 week  Wound Care Outpatient, *Est. 06/18/21 3:00:00 CDT, *Est. Stop date 06/18/21 3:00:00 CDT, [facility], FU with nurse in 3 days  Wound Care Team Treatment, 06/15/21 13:47:00 CDT, Stop date 06/15/21 13:47:00 CDT, Gentamicin, Mesalt and gauze packing to remain in place on right heel for 48 to 72 hours. Use Augmentin 875 mg twice daily as prescribed. Offload right foot frequently.  ?  2.?Pressure ulcer of right heel, stage 3?L89.613  ?The wound has progressed to?an unstageable?decubitus?ulcer.? Excisional?surgical debridement?was accomplished?as noted above.  Ordered:  Tissue Culture - Aerobic, Routine collect, 06/15/21 13:48:00 CDT, Order for future visit, Tissue, Heel, Nurse collect, Stop date 06/15/21 13:48:00 CDT, Pressure ulcer of right heel, stage 3  Quadriplegia  Infected decubitus ulcer  Wound Care Outpatient, *Est. 06/22/21 3:00:00 CDT, *Est. Stop  date 06/22/21 3:00:00 CDT, [facility], FU with Physician in 1 week  Wound Care Outpatient, *Est. 06/18/21 3:00:00 CDT, *Est. Stop date 06/18/21 3:00:00 CDT, [facility], FU with nurse in 3 days  Wound Care Team Treatment, 06/15/21 13:47:00 CDT, Stop date 06/15/21 13:47:00 CDT, Gentamicin, Mesalt and gauze packing to remain in place on right heel for 48 to 72 hours. Use Augmentin 875 mg twice daily as prescribed. Offload right foot frequently.  ?  3.?Quadriplegia?G82.50  ?The need for?frequent turning and effective offloading has been reemphasized.  Ordered:  Tissue Culture - Aerobic, Routine collect, 06/15/21 13:48:00 CDT, Order for future visit, Tissue, Heel, Nurse collect, Stop date 06/15/21 13:48:00 CDT, Pressure ulcer of right heel, stage 3  Quadriplegia  Infected decubitus ulcer  Wound Care Outpatient, *Est. 06/22/21 3:00:00 CDT, *Est. Stop date 06/22/21 3:00:00 CDT, [facility], FU with Physician in 1 week  Wound Care Outpatient, *Est. 06/18/21 3:00:00 CDT, *Est. Stop date 06/18/21 3:00:00 CDT, [facility], FU with nurse in 3 days  Wound Care Team Treatment, 06/15/21 13:47:00 CDT, Stop date 06/15/21 13:47:00 CDT, Gentamicin, Mesalt and gauze packing to remain in place on right heel for 48 to 72 hours. Use Augmentin 875 mg twice daily as prescribed. Offload right foot frequently.  ?  Orders:  amoxicillin-clavulanate, = 1 tab(s), Oral, q12hr, For infection, X 7 day(s), # 14 tab(s), 0 Refill(s), Pharmacy: [facility], 172.7, cm, Height/Length Dosing, 05/19/20 14:26:00 CDT, 102, kg, Weight Dosing, 05/19/20 14:26:00 CDT   Problem List/Past Medical History  Ongoing  Chronic skin ulcer  DM (diabetes mellitus)  Infected decubitus ulcer  Neurogenic bladder  Obesity  Open wound of abdomen  Pressure ulcer of heel  Pressure ulcer of right ischium  Quadriplegia  Quadriplegic spinal paralysis  Skin eschar  Historical  Pressure ulcer of right foot stage 3  Medications  Augmentin  875 mg-125 mg oral tablet, 1 tab(s), Oral, q12hr  Bactroban 2% topical ointment, 1 aruna, TOP, TID,? ?Not Taking, Completed Rx  BASAGLAR INJ 100UNIT  Bydureon BCise 2 mg/0.85 mL subcutaneous suspension, extended release,? ?Not Taking per Prescriber  Ciprofloxacin Hcl 500 Mg Tab, 500 mg= 1 tab(s), BID,? ?Not Taking, Completed Rx  clindamycin 150 mg oral capsule, 300 mg= 2 cap(s), Oral, q6hr,? ?Not Taking, Completed Rx  gentamicin 0.1% topical ointment, 1 aruna, TOP, Daily,? ?Not Taking, Completed Rx  gentamicin 0.1% topical ointment, 1 aruna, TOP, Daily, 2 refills  GLIPIZIDE TAB 10MG, 10 mg= 1 tab(s), Oral, BID,? ?Not Taking per Prescriber  JANUVIA TAB 100MG, 100 mg= 1 tab(s), Oral, Daily,? ?Not Taking per Prescriber  Januvia 50 mg oral tablet, 50 mg= 1 tab(s), Oral, Daily  Ketoconazole 2% Shampoo,? ?Not Taking, Completed Rx  Levofloxacin 500 Mg Tablet, 500 mg= 1 tab(s), Oral, Daily,? ?Not Taking, Completed Rx  MUPIROCIN OIN 2%,? ?Not taking  MYRBETRIQ TAB 50MG, 50 mg= 1 tab(s), Oral, Daily  OXYBUTYNIN TAB 5MG,? ?Not Taking per Prescriber  ReliOn/NovoLIN R 100 units/mL injectable solution  sulfamethoxazole-trimethoprim 800 mg-160 mg oral tablet, 1 tab(s), Oral, BID,? ?Not Taking, Completed Rx  Tramadol Hcl Tab 50 Mg, 50 mg= 1 tab(s), Oral, q4-6hr,? ?Not Taking, Completed Rx  Allergies  No Known Allergies  Social History  Abuse/Neglect  No, 05/19/2020  Tobacco  Never (less than 100 in lifetime), N/A, 05/19/2020  Never smoker, 05/01/2017  Health Maintenance  Health Maintenance  ???Pending?(in the next year)  ??? ??OverDue  ??? ? ? ?Obesity Screening due??01/01/21??and every 1??year(s)  ??? ? ? ?Alcohol Misuse Screening due??01/02/21??and every 1??year(s)  ??? ? ? ?ADL Screening due??05/19/21??and every 1??year(s)  ??? ??Due?  ??? ? ? ?Aspirin Therapy for CVD Prevention due??06/15/21??and every 1??year(s)  ??? ? ? ?Medicare Annual Wellness Exam due??06/15/21??and every 1??year(s)  ??? ? ? ?Tetanus Vaccine due??06/15/21??and  every 10??year(s)  ???Satisfied?(in the past 1 year)  ??? ??Satisfied?  ??? ? ? ?Blood Pressure Screening on??06/08/21.??Satisfied by VERONICA Tim Shawndala  ?

## 2022-05-04 NOTE — HISTORICAL [facility]
This is a historical note converted from Becca. Formatting and pictures may have been removed.  Please reference Becca for original formatting and attached multimedia. History of Present Illness  see notes  Review of Systems  see nurse notes. No complains  Physical Exam  Vitals & Measurements  T:?36.5? ?C (Oral)? HR:?129(Peripheral)? RR:?18? BP:?105/75? SpO2:?97%?  ?  wound is looking good, no much drainage, healing good. See nurse notes  Assessment/Plan  1.?Pressure ulcer of right heel, stage 3?L89.613  Incision/Wounds  ?1. Heel Right Pressure ulcer?- last charted: 07/13/2021 13:04  ?? ??Assessment Done By?- Wound Care Team  ?? ??Pressure Point?- Bony prominence  ?? ??Dressing Type?- ABD dressing pad, Gauze dressing, Honey, Medicated packing strips, Rolled Gauze, Tape  ?? ??Dressing Assessment?- Drainage present, Intact  ?? ??Dressing Activity?- Changed  ?? ??Cleansing?- Cleaned with normal saline  ?? ??Length?- 3.5 cm  ?? ??Width?- 6.4 cm  ?? ??Depth?- 0.1 cm  ?? ??Wound Bed Tissue Type?- Granulating, Necrotic tissue, slough, Pale Pink, Scab, Stringy  ?? ??Pressure Ulcer Stage?- III  ?? ??Exudate Amount?- Moderate  ?? ??Exudate Type?- Serous  ?? ??Exudate Odor?- None  ?? ??Edge?- Well defined  ?? ??Surrounding Tissue Color?- Normal  ?? ??Surrounding Tissue Condition?- Dry, Intact  ?? ??Status?- Improving  ?Pt examined and notes review. Start collagen with silver Q2D. RTC in 2 weeks. Double layer of tubigrip E.  Ordered:  Wound Care Outpatient, *Est. 08/10/21 3:00:00 CDT, *Est. Stop date 08/10/21 3:00:00 CDT, [facility], Return to Clinic 2 weeks  Wound Care Team Treatment, 07/27/21 13:35:00 CDT, Stop date 07/27/21 13:35:00 CDT, Start collagen with silver Q2 days. RTC in 2 week. Tubigrip E, double layer for compression.  ?  2.?Lymphedema of leg?I89.0  Ordered:  Wound Care Outpatient, *Est. 08/10/21 3:00:00 CDT, *Est. Stop date 08/10/21 3:00:00 CDT, [facility], Return to Clinic 2 weeks  Wound Care  Team Treatment, 07/27/21 13:35:00 CDT, Stop date 07/27/21 13:35:00 CDT, Start collagen with silver Q2 days. RTC in 2 week. Tubigrip E, double layer for compression.  ?  3.?Quadriplegia?G82.50  Ordered:  Wound Care Outpatient, *Est. 08/10/21 3:00:00 CDT, *Est. Stop date 08/10/21 3:00:00 CDT, [facility], Return to Clinic 2 weeks  Wound Care Team Treatment, 07/27/21 13:35:00 CDT, Stop date 07/27/21 13:35:00 CDT, Start collagen with silver Q2 days. RTC in 2 week. Tubigrip E, double layer for compression.  ?   Problem List/Past Medical History  Ongoing  Chronic skin ulcer  DM (diabetes mellitus)  Infected decubitus ulcer  Lymphedema of leg  Neurogenic bladder  Obesity  Open wound of abdomen  Pressure ulcer of heel  Pressure ulcer of right ischium  Quadriplegia  Quadriplegic spinal paralysis  Skin eschar  Historical  Pressure ulcer of right foot stage 3  Medications  Bactroban 2% topical ointment, 1 aruna, TOP, TID,? ?Not Taking, Completed Rx  BASAGLAR INJ 100UNIT  Bydureon BCise 2 mg/0.85 mL subcutaneous suspension, extended release,? ?Not Taking per Prescriber  Ciprofloxacin Hcl 500 Mg Tab, 500 mg= 1 tab(s), BID,? ?Not Taking, Completed Rx  clindamycin 150 mg oral capsule, 300 mg= 2 cap(s), Oral, q6hr,? ?Not Taking, Completed Rx  gentamicin 0.1% topical ointment, 1 aruna, TOP, Daily,? ?Not Taking, Completed Rx  gentamicin 0.1% topical ointment, 1 aruna, TOP, Daily, 2 refills  GLIPIZIDE TAB 10MG, 10 mg= 1 tab(s), Oral, BID,? ?Not Taking per Prescriber  JANUVIA TAB 100MG, 100 mg= 1 tab(s), Oral, Daily,? ?Not Taking per Prescriber  Januvia 50 mg oral tablet, 50 mg= 1 tab(s), Oral, Daily  Ketoconazole 2% Shampoo,? ?Not Taking, Completed Rx  Levofloxacin 500 Mg Tablet, 500 mg= 1 tab(s), Oral, Daily,? ?Not Taking, Completed Rx  MUPIROCIN OIN 2%,? ?Not taking  MYRBETRIQ TAB 50MG, 50 mg= 1 tab(s), Oral, Daily  OXYBUTYNIN TAB 5MG,? ?Not Taking per Prescriber  ReliOn/NovoLIN R 100 units/mL injectable  solution  sulfamethoxazole-trimethoprim 800 mg-160 mg oral tablet, 1 tab(s), Oral, BID,? ?Not Taking, Completed Rx  Tramadol Hcl Tab 50 Mg, 50 mg= 1 tab(s), Oral, q4-6hr,? ?Not Taking, Completed Rx  Allergies  No Known Allergies  Social History  Abuse/Neglect  No, 05/19/2020  Tobacco  Never (less than 100 in lifetime), N/A, 05/19/2020  Never smoker, 05/01/2017  Health Maintenance  Health Maintenance  ???Pending?(in the next year)  ??? ??OverDue  ??? ? ? ?Obesity Screening due??01/01/21??and every 1??year(s)  ??? ? ? ?Alcohol Misuse Screening due??01/02/21??and every 1??year(s)  ??? ? ? ?ADL Screening due??05/19/21??and every 1??year(s)  ??? ??Due?  ??? ? ? ?Aspirin Therapy for CVD Prevention due??07/27/21??and every 1??year(s)  ??? ? ? ?Medicare Annual Wellness Exam due??07/27/21??and every 1??year(s)  ??? ? ? ?Tetanus Vaccine due??07/27/21??and every 10??year(s)  ???Satisfied?(in the past 1 year)  ??? ??Satisfied?  ??? ? ? ?Blood Pressure Screening on??07/27/21.??Satisfied by VERONICA Tim Shawndala  ?

## 2022-05-04 NOTE — HISTORICAL [facility] CERN[facility]
This is a historical note converted from Becca. Formatting and pictures may have been removed.  Please reference Becca for original formatting and attached multimedia. Chief Complaint  C/O recurring pressure ulcer to heel  Rt. buttock, ?ischial ulcer?  Physical Exam  Vitals & Measurements  T:?36.7? ?C (Oral)? HR:?132(Peripheral)? RR:?18? BP:?112/81? SpO2:?99%?  HT:?172.7?cm? WT:?102?kg?  Assessment/Plan  1.?Quadriplegia?G82.50  ?Incision/Wounds  ?1. Heel Right Pressure ulcer?- last charted: 06/24/2020 11:17  ?? ??Assessment Done By?- Wound Care Team  ?? ??Pressure Point?- Bony prominence  ?? ??Dressing Type?- None  ?? ??Cleansing?- Cleaned with normal saline  ?? ??Length?- 0 cm  ?? ??Width?- 0 cm  ?? ??Depth?- 0 cm  ?? ??Wound Bed Tissue Type?- Epithelialized  ?? ??Percent Other Tissue?- 100 %  ?? ??Exudate Odor?- None  ?? ??Edge?- Attached to wound bed  ?? ??Surrounding Tissue Color?- Normal  ?? ??Surrounding Tissue Condition?- Edematous  ?? ??Status?- Healed  ?  ?2. Right Other: ischial pressure ulcer?- last charted: 06/24/2020 11:17  ?? ??Assessment Done By?- Wound Care Team  ?? ??Abnormality Pattern?- Flat  ?? ??Pressure Point?- Bony prominence  ?? ??Dressing Type?- Gauze dressing, Honey, Nonadherent dressing, Tape  ?? ??Dressing Assessment?- Dry, Intact  ?? ??Dressing Activity?- Removed  ?? ??Cleansing?- Cleaned with normal saline  ?? ??Wound Bed Tissue Type?- Friable, Granulating, Necrotic tissue, eschar  ?? ??Pressure Ulcer Stage?- Unstageable  ?? ??Exudate Amount?- Small  ?? ??Exudate Type?- Serosanguineous  ?? ??Exudate Odor?- None  ?? ??Edge?- Attached to wound bed, Poorly defined  ?? ??Surrounding Tissue Color?- Erythema  ?? ??Surrounding Tissue Condition?- Friable, Intact  ?? ??Topical Agent Application?- Gel  She presents for follow-up continuing to respond to meta honey therapy to his issue wound?will continue same follow-up in 1 week  2.?Pressure ulcer of heel?L89.609  3.?Skin  eschar?R23.4  4.?Pressure ulcer of right ischium?L89.319  5.?Neurogenic bladder?N31.9  6.?DM (diabetes mellitus)?E11.9  Orders:  Wound Care Outpatient, *Est. 07/01/20 3:00:00 CDT, *Est. Stop date 07/01/20 3:00:00 CDT, [facility], FU with Physician in 1 week  Wound Care Team Treatment, 06/24/20 11:30:00 CDT, Stop date 06/24/20 11:30:00 CDT, continue medihoney to wound daily   Problem List/Past Medical History  Ongoing  Chronic skin ulcer  DM (diabetes mellitus)  Neurogenic bladder  Obesity  Open wound of abdomen  Pressure ulcer of heel  Pressure ulcer of right ischium  Quadriplegia  Quadriplegic spinal paralysis  Skin eschar  Historical  Pressure ulcer of right foot stage 3  Medications  Bactroban 2% topical ointment, 1 aruna, TOP, TID,? ?Not Taking, Completed Rx  BASAGLAR INJ 100UNIT  Bydureon BCise 2 mg/0.85 mL subcutaneous suspension, extended release,? ?Not Taking per Prescriber  Ciprofloxacin Hcl 500 Mg Tab, 500 mg= 1 tab(s), BID,? ?Not Taking, Completed Rx  clindamycin 150 mg oral capsule, 300 mg= 2 cap(s), Oral, q6hr,? ?Not Taking, Completed Rx  GLIPIZIDE TAB 10MG, 10 mg= 1 tab(s), Oral, BID,? ?Not Taking per Prescriber  JANUVIA TAB 100MG, 100 mg= 1 tab(s), Oral, Daily,? ?Not Taking per Prescriber  Januvia 50 mg oral tablet, 50 mg= 1 tab(s), Oral, Daily  Ketoconazole 2% Shampoo,? ?Not Taking, Completed Rx  Levofloxacin 500 Mg Tablet, 500 mg= 1 tab(s), Oral, Daily,? ?Not Taking, Completed Rx  MUPIROCIN OIN 2%,? ?Not taking  MYRBETRIQ TAB 50MG, 50 mg= 1 tab(s), Oral, Daily  OXYBUTYNIN TAB 5MG,? ?Not Taking per Prescriber  ReliOn/NovoLIN R 100 units/mL injectable solution  Tramadol Hcl Tab 50 Mg, 50 mg= 1 tab(s), Oral, q4-6hr,? ?Not Taking, Completed Rx  Allergies  No Known Allergies  Social History  Abuse/Neglect  No, 05/19/2020  Tobacco  Never (less than 100 in lifetime), N/A, 05/19/2020  Never smoker, 05/01/2017  Health Maintenance  Health Maintenance  ???Pending?(in the next year)  ??? ??OverDue  ??? ?  ? ?Alcohol Misuse Screening due??01/01/20??and every 1??year(s)  ??? ? ? ?Obesity Screening due??01/01/20??and every 1??year(s)  ??? ??Due?  ??? ? ? ?Aspirin Therapy for CVD Prevention due??06/24/20??and every 1??year(s)  ??? ? ? ?Medicare Annual Wellness Exam due??06/24/20??and every 1??year(s)  ??? ? ? ?Tetanus Vaccine due??06/24/20??and every 10??year(s)  ??? ??Due In Future?  ??? ? ? ?ADL Screening not due until??05/19/21??and every 1??year(s)  ???Satisfied?(in the past 1 year)  ??? ??Satisfied?  ??? ? ? ?ADL Screening on??05/19/20.??Satisfied by Jolynn Madrid LPN  ??? ? ? ?Blood Pressure Screening on??06/24/20.??Satisfied by Jolynn Madrid LPN  ?

## 2022-05-04 NOTE — HISTORICAL [facility]
This is a historical note converted from Becca. Formatting and pictures may have been removed.  Please reference Becca for original formatting and attached multimedia. History of Present Illness  The patient?returns for?management of?chronic?decubiti?ulcers over the?right gluteal?area?and?right heel.? He continues to have?swelling?over the?right lower extremity greater than the left. 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 His caregiver reports that she has been using?Aquaphor, Neosporin and?iodine to his wound sites.  Review of Systems  Not significantly different than history of present and past medical illnesses.  Physical Exam  Vitals & Measurements  T:?36.6? ?C (Oral)? HR:?129(Peripheral)? RR:?18? BP:?87/58? SpO2:?98%?  ?  On examination the patient has?2+?nonpitting edema involving his?dorsal right foot?and 1-2+ edema involving his legs.? There are?shallow ulcerations over the?left posterior heel?in the midst of a broad area of hypertrophic scarring.? A small amount of slough is present. ?There is no evidence of infection.Inspection of the?buttock reveals?hypertrophic scarring?with?a few?superficial?ulcers?with excoriation. ?There is no evidence of infection.  ?  Incision/Wounds  ?1. Heel Right Pressure ulcer?- last charted: 05/04/2021 11:00  ?? ??Assessment Done By?- Wound Care Team  ?? ??Pressure Point?- Bony prominence  ?? ??Dressing Type?- Gauze dressing  ?? ??Dressing Assessment?- Clean, Dry, Intact  ?? ??Dressing Activity?- Removed  ?? ??Cleansing?- Cleaned with normal saline  ?? ??Length?- 0.8 cm  ?? ??Width?- 1.0 cm  ?? ??Depth?- 0.3 cm  ?? ??Wound Bed Tissue Type?- Dry, Granulating, Pale Pink, Scab  ?? ??Pressure Ulcer Stage?- III  ?? ??Exudate Amount?- None  ?? ??Surrounding Tissue Color?- Normal  ?? ??Surrounding Tissue Condition?- Dry, Intact  ?? ??Topical Agent Application?- Ointment  ?  Assessment/Plan  1.?Pressure ulcer of right  ischium?L89.319  ?Continue?wound care modalities as recommended and documented below.  Ordered:  Wound Care Outpatient, *Est. 05/25/21 3:00:00 CDT, *Est. Stop date 05/25/21 3:00:00 CDT, [facility], Return to Clinic 3 weeks  Wound Care Team Treatment, 05/04/21 11:53:00 CDT, Stop date 05/04/21 11:53:00 CDT, Apply Adaptic to wound bed and cover with foam. Change every other day. Apply Aquaphor and diaper rash paste of choice to buttocks and change daily to every other day as needed.  ?  2.?Pressure ulcer of heel?L89.609  ?Continue wound care as?recommended and documented below.  Ordered:  Wound Care Outpatient, *Est. 05/25/21 3:00:00 CDT, *Est. Stop date 05/25/21 3:00:00 CDT, [facility], Return to Clinic 3 weeks  Wound Care Team Treatment, 05/04/21 11:53:00 CDT, Stop date 05/04/21 11:53:00 CDT, Apply Adaptic to wound bed and cover with foam. Change every other day. Apply Aquaphor and diaper rash paste of choice to buttocks and change daily to every other day as needed.  ?  3.?Quadriplegia?G82.50  ?The need for frequent?turning and?offloading has been?reemphasized.  Ordered:  Wound Care Outpatient, *Est. 05/25/21 3:00:00 CDT, *Est. Stop date 05/25/21 3:00:00 CDT, [facility], Return to Clinic 3 weeks  Wound Care Team Treatment, 05/04/21 11:53:00 CDT, Stop date 05/04/21 11:53:00 CDT, Apply Adaptic to wound bed and cover with foam. Change every other day. Apply Aquaphor and diaper rash paste of choice to buttocks and change daily to every other day as needed.  ?   Problem List/Past Medical History  Ongoing  Chronic skin ulcer  DM (diabetes mellitus)  Neurogenic bladder  Obesity  Open wound of abdomen  Pressure ulcer of heel  Pressure ulcer of right ischium  Quadriplegia  Quadriplegic spinal paralysis  Skin eschar  Historical  Pressure ulcer of right foot stage 3  Medications  Bactroban 2% topical ointment, 1 aruna, TOP, TID,? ?Not Taking, Completed Rx  BASAGLAR INJ 100UNIT  Bydureon BCise 2 mg/0.85  mL subcutaneous suspension, extended release,? ?Not Taking per Prescriber  Ciprofloxacin Hcl 500 Mg Tab, 500 mg= 1 tab(s), BID,? ?Not Taking, Completed Rx  clindamycin 150 mg oral capsule, 300 mg= 2 cap(s), Oral, q6hr,? ?Not Taking, Completed Rx  gentamicin 0.1% topical ointment, 1 aruna, TOP, Daily,? ?Not Taking, Completed Rx  gentamicin 0.1% topical ointment, 1 aruna, TOP, Daily, 2 refills  GLIPIZIDE TAB 10MG, 10 mg= 1 tab(s), Oral, BID,? ?Not Taking per Prescriber  JANUVIA TAB 100MG, 100 mg= 1 tab(s), Oral, Daily,? ?Not Taking per Prescriber  Januvia 50 mg oral tablet, 50 mg= 1 tab(s), Oral, Daily  Ketoconazole 2% Shampoo,? ?Not Taking, Completed Rx  Levofloxacin 500 Mg Tablet, 500 mg= 1 tab(s), Oral, Daily,? ?Not Taking, Completed Rx  MUPIROCIN OIN 2%,? ?Not taking  MYRBETRIQ TAB 50MG, 50 mg= 1 tab(s), Oral, Daily  OXYBUTYNIN TAB 5MG,? ?Not Taking per Prescriber  ReliOn/NovoLIN R 100 units/mL injectable solution  sulfamethoxazole-trimethoprim 800 mg-160 mg oral tablet, 1 tab(s), Oral, BID,? ?Not Taking, Completed Rx  Tramadol Hcl Tab 50 Mg, 50 mg= 1 tab(s), Oral, q4-6hr,? ?Not Taking, Completed Rx  Allergies  No Known Allergies  Social History  Abuse/Neglect  No, 05/19/2020  Tobacco  Never (less than 100 in lifetime), N/A, 05/19/2020  Never smoker, 05/01/2017  Health Maintenance  Health Maintenance  ???Pending?(in the next year)  ??? ??OverDue  ??? ? ? ?Obesity Screening due??01/01/21??and every 1??year(s)  ??? ? ? ?Alcohol Misuse Screening due??01/02/21??and every 1??year(s)  ??? ??Due?  ??? ? ? ?Aspirin Therapy for CVD Prevention due??05/04/21??and every 1??year(s)  ??? ? ? ?Medicare Annual Wellness Exam due??05/04/21??and every 1??year(s)  ??? ? ? ?Tetanus Vaccine due??05/04/21??and every 10??year(s)  ??? ??Due In Future?  ??? ? ? ?ADL Screening not due until??05/19/21??and every 1??year(s)  ???Satisfied?(in the past 1 year)  ??? ??Satisfied?  ??? ? ? ?ADL Screening on??05/19/20.??Satisfied by Julius MARTIN,  Jolynn ESCOBAR  ??? ? ? ?Blood Pressure Screening on??05/04/21.??Satisfied by VERONICA Tim Shawndala  ?      The patients?caregivers?are not certified to do?wound care?and?he does not have?a home health?service.? He will therefore be referred to a local home health agency?for?general medical surveillance and assistance with wound care as?recommended and documented above.

## 2022-05-04 NOTE — HISTORICAL [facility]
This is a historical note converted from Becca. Formatting and pictures may have been removed.  Please reference Becca for original formatting and attached multimedia. Chief Complaint  C/O recurring pressure ulcer to heel  Rt. buttock, ?ischial ulcer?  History of Present Illness  Presents for continued management of a chronic?right ischial?pressure ulcer?and?follow-up of his?right heel ulcer.? No new complaints or?presented today.  Physical Exam  Vitals & Measurements  T:?36.7? ?C (Oral)? HR:?129(Peripheral)? RR:?18? BP:?163/130? SpO2:?97%?  HT:?172.7?cm? WT:?102?kg?  On examination his?right ischial wound?has significantly less?moisture?and?there is thin?eschar present?at the previous wound site. ?There is no evidence of infection. ?There is a small amount of?circumferential?periwound?desquamation.? Inspection of his?right heel?reveals good granulation tissue deposit and contraction of the wound size.  ?  Incision/Wounds  ?1. Buttock Right Inner?- last charted: 01/12/2021 11:32  ?? ??Assessment Done By?- Wound Care Team  ?? ??Pressure Point?- Bony prominence  ?? ??Dressing Type?- ABD dressing pad, Gauze dressing, Tape  ?? ??Dressing Assessment?- Dry, Intact  ?? ??Dressing Activity?- Changed  ?? ??Cleansing?- Cleaned with normal saline  ?? ??Length?- 4 cm  ?? ??Width?- 2 cm  ?? ??Depth?- 0.1 cm  ?? ??Wound Bed Tissue Type?- Dry, Epithelialized, Friable, Scab  ?? ??Percent Epithelialized?- 100 %  ?? ??Pressure Ulcer Stage?- IV  ?? ??Exudate Amount?- None  ?? ??Exudate Odor?- None  ?? ??Edge?- Poorly defined  ?? ??Surrounding Tissue Color?- Normal  ?? ??Surrounding Tissue Condition?- Dry, Excoriated  ?? ??Status?- Improving  ?  ?2. Heel Right Pressure ulcer?- last charted: 01/12/2021 11:32  ?? ??Assessment Done By?- Wound Care Team  ?? ??Pressure Point?- Bony prominence  ?? ??Dressing Type?- Gauze dressing, Honey, Nonadherent dressing, Tape  ?? ??Dressing Assessment?- Drainage present, Intact  ?? ??Dressing  Activity?- Removed  ?? ??Cleansing?- Cleaned with normal saline  ?? ??Length?- 0.7 cm  ?? ??Width?- 1.1 cm  ?? ??Depth?- 0.1 cm  ?? ??Wound Bed Tissue Type?- Granulating, Pale Pink  ?? ??Exudate Amount?- Moderate  ?? ??Exudate Type?- Serous  ?? ??Exudate Odor?- None  ?? ??Edge?- Well defined  ?? ??Surrounding Tissue Color?- Normal  ?? ??Surrounding Tissue Condition?- Intact  ?  Assessment/Plan  1.?Pressure ulcer of right ischium?L89.319  Continue current therapy.  Ordered:  Wound Care Outpatient, *Est. 01/26/21 3:00:00 CST, *Est. Stop date 01/26/21 3:00:00 CST, [facility], Return to Clinic 2 weeks  Wound Care Team Treatment, 01/12/21 11:51:00 CST, Stop date 01/12/21 11:51:00 CST, Apply collagen matrix dressing to wounds and use diaper overlay on right ischium to assist with drainage offloading.  Wound Care Team Treatment, 01/12/21 11:50:00 CST, Stop date 01/12/21 11:50:00 CST  ?  2.?Pressure ulcer of heel?L89.609  ?Continue current therapy.  Ordered:  Wound Care Outpatient, *Est. 01/26/21 3:00:00 CST, *Est. Stop date 01/26/21 3:00:00 CST, [facility], Return to Clinic 2 weeks  Wound Care Team Treatment, 01/12/21 11:51:00 CST, Stop date 01/12/21 11:51:00 CST, Apply collagen matrix dressing to wounds and use diaper overlay on right ischium to assist with drainage offloading.  Wound Care Team Treatment, 01/12/21 11:50:00 CST, Stop date 01/12/21 11:50:00 CST  ?  3.?DM (diabetes mellitus)?E11.9  Ordered:  Wound Care Outpatient, *Est. 01/26/21 3:00:00 CST, *Est. Stop date 01/26/21 3:00:00 CST, [facility], Return to Clinic 2 weeks  Wound Care Team Treatment, 01/12/21 11:51:00 CST, Stop date 01/12/21 11:51:00 CST, Apply collagen matrix dressing to wounds and use diaper overlay on right ischium to assist with drainage offloading.  Wound Care Team Treatment, 01/12/21 11:50:00 CST, Stop date 01/12/21 11:50:00 CST  ?   Problem List/Past Medical History  Ongoing  Chronic skin ulcer  DM (diabetes  mellitus)  Neurogenic bladder  Obesity  Open wound of abdomen  Pressure ulcer of heel  Pressure ulcer of right ischium  Quadriplegia  Quadriplegic spinal paralysis  Skin eschar  Historical  Pressure ulcer of right foot stage 3  Medications  Bactroban 2% topical ointment, 1 aruna, TOP, TID,? ?Not Taking, Completed Rx  BASAGLAR INJ 100UNIT  Bydureon BCise 2 mg/0.85 mL subcutaneous suspension, extended release,? ?Not Taking per Prescriber  Ciprofloxacin Hcl 500 Mg Tab, 500 mg= 1 tab(s), BID,? ?Not Taking, Completed Rx  clindamycin 150 mg oral capsule, 300 mg= 2 cap(s), Oral, q6hr,? ?Not Taking, Completed Rx  gentamicin 0.1% topical ointment, 1 aruna, TOP, Daily,? ?Not Taking, Completed Rx  gentamicin 0.1% topical ointment, 1 aruna, TOP, Daily, 2 refills  GLIPIZIDE TAB 10MG, 10 mg= 1 tab(s), Oral, BID,? ?Not Taking per Prescriber  JANUVIA TAB 100MG, 100 mg= 1 tab(s), Oral, Daily,? ?Not Taking per Prescriber  Januvia 50 mg oral tablet, 50 mg= 1 tab(s), Oral, Daily  Ketoconazole 2% Shampoo,? ?Not Taking, Completed Rx  Levofloxacin 500 Mg Tablet, 500 mg= 1 tab(s), Oral, Daily,? ?Not Taking, Completed Rx  MUPIROCIN OIN 2%,? ?Not taking  MYRBETRIQ TAB 50MG, 50 mg= 1 tab(s), Oral, Daily  OXYBUTYNIN TAB 5MG,? ?Not Taking per Prescriber  ReliOn/NovoLIN R 100 units/mL injectable solution  sulfamethoxazole-trimethoprim 800 mg-160 mg oral tablet, 1 tab(s), Oral, BID,? ?Not Taking, Completed Rx  Tramadol Hcl Tab 50 Mg, 50 mg= 1 tab(s), Oral, q4-6hr,? ?Not Taking, Completed Rx  Allergies  No Known Allergies  Social History  Abuse/Neglect  No, 05/19/2020  Tobacco  Never (less than 100 in lifetime), N/A, 05/19/2020  Never smoker, 05/01/2017  Health Maintenance  Health Maintenance  ???Pending?(in the next year)  ??? ??Due?  ??? ? ? ?Obesity Screening due??01/01/21??and every 1??year(s)  ??? ? ? ?Alcohol Misuse Screening due??01/02/21??and every 1??year(s)  ??? ? ? ?Aspirin Therapy for CVD Prevention due??01/12/21??and every 1??year(s)  ??? ?  ? ?Medicare Annual Wellness Exam due??01/12/21??and every 1??year(s)  ??? ? ? ?Tetanus Vaccine due??01/12/21??and every 10??year(s)  ??? ??Due In Future?  ??? ? ? ?ADL Screening not due until??05/19/21??and every 1??year(s)  ???Satisfied?(in the past 1 year)  ??? ??Satisfied?  ??? ? ? ?ADL Screening on??05/19/20.??Satisfied by Jolynn Madrid LPN  ??? ? ? ?Blood Pressure Screening on??01/12/21.??Satisfied by Junior MADDOX, Nguyen Nunn  ?

## 2022-05-04 NOTE — HISTORICAL [facility]
This is a historical note converted from Becca. Formatting and pictures may have been removed.  Please reference Becca for original formatting and attached multimedia. Chief Complaint  ulcer of the right heel small wound on the abdomen  History of Present Illness  see nurses notes  Review of Systems  see nurses notes  Physical Exam  Vitals & Measurements  T:?36.9? ?C (Oral)? HR:?88(Peripheral)? RR:?18? BP:?139/86? SpO2:?97%?  ?  Assessment/Plan  1.?Open wound of abdomen?S31.109A  ? Incision/Wounds  ?1. Abdomen Lower, Other: surgical incision?- last charted: 05/29/2019 11:00  ?? ??Assessment Done By?- Wound Care Team  ?? ??Dressing Type?- Gauze dressing, Medicated packing strips, Tape  ?? ??Dressing Assessment?- Drainage present, Intact  ?? ??Dressing Activity?- Changed  ?? ??Cleansing?- Cleaned with soap and water  ?? ??Length?- 0.3 cm  ?? ??Width?- 0.3 cm  ?? ??Depth?- 2.4 cm  ?? ??Wound Bed Tissue Type?- Granulating  ?? ??Percent Granulated?- 100 %  ?? ??Exudate Amount?- Small  ?? ??Exudate Type?- Serosanguineous  ?? ??Exudate Odor?- None  ?? ??Edge?- Well defined  ?? ??Surrounding Tissue Color?- Normal  ?? ??Surrounding Tissue Condition?- Intact  ?  ?2. Foot Right Plantar Blister?- last charted: 05/29/2019 11:00  ?? ??Assessment Done By?- Wound Care Team  ?? ??Dressing Type?- Foam, Medicated packing strips  ?? ??Dressing Assessment?- Drainage present, Intact  ?? ??Dressing Activity?- Applied  ?? ??Cleansing?- Cleaned with normal saline  ?? ??Length?- 0.5 cm  ?? ??Width?- 0.8 cm  ?? ??Depth?- 0.1 cm  ?? ??Wound Bed Tissue Type?- Granulating  ?? ??Percent Granulated?- 100 %  ?? ??Exudate Amount?- Scant  ?? ??Exudate Type?- Serosanguineous  ?? ??Exudate Odor?- None  ?? ??Edge?- Attached to wound bed  ?? ??Surrounding Tissue Color?- Normal  ?? ??Surrounding Tissue Condition?- Intact  ?? ??Status?- Improving  ?  ?wounds much imporved will continue poc and fu in one week  2.?Ulcer of right  heel?L97.419  Orders:  Wound Care Outpatient, *Est. 06/05/19 3:00:00 CDT, *Est. Stop date 06/05/19 3:00:00 CDT, [facility], FU with Physician in 1 week  Wound Care Team Treatment, 05/29/19 11:32:00 CDT, Stop date 05/29/19 11:32:00 CDT, continue mesalt packing of the abdomen and collagen matrix dressing to the heel   Problem List/Past Medical History  Ongoing  DM (diabetes mellitus)  Open wound of abdomen  Quadriplegia  Quadriplegic spinal paralysis  Historical  No qualifying data  Medications  Bactroban 2% topical ointment, 1 aruna, TOP, TID,? ?Not Taking, Completed Rx  BASAGLAR INJ 100UNIT  Ciprofloxacin Hcl 500 Mg Tab, 500 mg= 1 tab(s), BID,? ?Not Taking, Completed Rx  clindamycin 150 mg oral capsule, 300 mg= 2 cap(s), Oral, q6hr,? ?Not Taking, Completed Rx  GLIPIZIDE TAB 10MG, 10 mg= 1 tab(s), Oral, BID  JANUVIA TAB 100MG, 100 mg= 1 tab(s), Oral, Daily  Ketoconazole 2% Shampoo,? ?Not Taking, Completed Rx  Levofloxacin 500 Mg Tablet, 500 mg= 1 tab(s), Oral, Daily,? ?Not Taking, Completed Rx  MUPIROCIN OIN 2%,? ?Not taking  MYRBETRIQ TAB 50MG, 50 mg= 1 tab(s), Oral, Daily  OXYBUTYNIN TAB 5MG,? ?Not Taking per Prescriber  ReliOn/NovoLIN R 100 units/mL injectable solution  Tramadol Hcl Tab 50 Mg, 50 mg= 1 tab(s), Oral, q4-6hr  Allergies  No Known Allergies  Social History  Tobacco  Never smoker, 05/11/2017  Health Maintenance  Health Maintenance  ???Pending?(in the next year)  ??? ??OverDue  ??? ? ? ?Alcohol Misuse Screening due??01/01/19??and every 1??year(s)  ??? ? ? ?Obesity Screening due??01/01/19??and every 1??year(s)  ??? ??Due?  ??? ? ? ?Aspirin Therapy for CVD Prevention due??05/29/19??and every 1??year(s)  ??? ? ? ?Tetanus Vaccine due??05/29/19??and every 10??year(s)  ??? ??Due In Future?  ??? ? ? ?ADL Screening not due until??05/30/19??and every 1??year(s)  ???Satisfied?(in the past 1 year)  ??? ??Satisfied?  ??? ? ? ?ADL Screening on??05/30/18.??Satisfied by Jolynn Madrid LPN  ??? ? ? ?Blood  Pressure Screening on??05/29/19.??Satisfied by Junior MADDOX, Nguyen Nunn  ?  ?

## 2022-05-04 NOTE — HISTORICAL [facility] C[facility]
This is a historical note converted from Becca. Formatting and pictures may have been removed.  Please reference Becca for original formatting and attached multimedia. Chief Complaint  history of abdominal wound and pressure ulcers  History of Present Illness  see nurses notes  Review of Systems  see nurses notes  Physical Exam  Vitals & Measurements  T:?36.8? ?C (Oral)? HR:?77(Peripheral)? RR:?18? BP:?161/97? SpO2:?96%?  ?  Assessment/Plan  1.?Open wound of abdomen?S31.109A  ?Incision/Wounds  ?1. Abdomen Lower, Other: surgical incision?- last charted: 06/26/2019 11:23  ?? ??Assessment Done By?- Wound Care Team  ?? ??Dressing Type?- Gauze dressing, Medicated packing strips, Tape  ?? ??Dressing Assessment?- Clean, Dry, Intact  ?? ??Dressing Activity?- Changed  ?? ??Cleansing?- Cleaned with normal saline  ?? ??Length?- 0 cm  ?? ??Width?- 0 cm  ?? ??Depth?- 0 cm  ?? ??Wound Bed Tissue Type?- Epithelialized  ?? ??Percent Granulated?- 100 %  ?? ??Exudate Amount?- None  ?? ??Exudate Odor?- None  ?? ??Edge?- Well defined  ?? ??Surrounding Tissue Color?- Normal  ?? ??Surrounding Tissue Condition?- Intact  ?? ??Status?- Healed  ?  ?2. Foot Right Plantar Blister?- last charted: 06/26/2019 11:23  ?? ??Assessment Done By?- Wound Care Team  ?? ??Dressing Type?- None  ?? ??Cleansing?- Cleaned with normal saline  ?? ??Length?- 0.5 cm  ?? ??Width?- 0.7 cm  ?? ??Depth?- 0.1 cm  ?? ??Wound Bed Tissue Type?- Epithelialized, Scab  ?? ??Percent Epithelialized?- 100 %  ?? ??Exudate Odor?- None  ?? ??Edge?- Attached to wound bed  ?? ??Surrounding Tissue Color?- Normal  ?? ??Surrounding Tissue Condition?- Intact  ?? ??Status?- Healed  ?all wounds healed will dc pt for prn fu  2.?Chronic skin ulcer?L98.499  Orders:  Wound Care Team Treatment, 06/26/19 11:37:00 CDT, Stop date 06/26/19 11:37:00 CDT, dc pt for prn fu as needed   Problem List/Past Medical History  Ongoing  Chronic skin ulcer  DM (diabetes mellitus)  Open wound of  abdomen  Quadriplegia  Quadriplegic spinal paralysis  Historical  No qualifying data  Medications  Bactroban 2% topical ointment, 1 aruna, TOP, TID,? ?Not Taking, Completed Rx  BASAGLAR INJ 100UNIT  Bydureon BCise 2 mg/0.85 mL subcutaneous suspension, extended release  Ciprofloxacin Hcl 500 Mg Tab, 500 mg= 1 tab(s), BID,? ?Not Taking, Completed Rx  clindamycin 150 mg oral capsule, 300 mg= 2 cap(s), Oral, q6hr,? ?Not Taking, Completed Rx  GLIPIZIDE TAB 10MG, 10 mg= 1 tab(s), Oral, BID,? ?Not Taking per Prescriber  JANUVIA TAB 100MG, 100 mg= 1 tab(s), Oral, Daily,? ?Not Taking per Prescriber  Ketoconazole 2% Shampoo,? ?Not Taking, Completed Rx  Levofloxacin 500 Mg Tablet, 500 mg= 1 tab(s), Oral, Daily,? ?Not Taking, Completed Rx  MUPIROCIN OIN 2%,? ?Not taking  MYRBETRIQ TAB 50MG, 50 mg= 1 tab(s), Oral, Daily  OXYBUTYNIN TAB 5MG,? ?Not Taking per Prescriber  ReliOn/NovoLIN R 100 units/mL injectable solution  Tramadol Hcl Tab 50 Mg, 50 mg= 1 tab(s), Oral, q4-6hr  Allergies  No Known Allergies  Social History  Tobacco  Never smoker, 05/01/2017  Health Maintenance  Health Maintenance  ???Pending?(in the next year)  ??? ??OverDue  ??? ? ? ?Alcohol Misuse Screening due??01/01/19??and every 1??year(s)  ??? ? ? ?Obesity Screening due??01/01/19??and every 1??year(s)  ??? ? ? ?ADL Screening due??05/30/19??and every 1??year(s)  ??? ??Due?  ??? ? ? ?Aspirin Therapy for CVD Prevention due??06/26/19??and every 1??year(s)  ??? ? ? ?Tetanus Vaccine due??06/26/19??and every 10??year(s)  ???Satisfied?(in the past 1 year)  ??? ??Satisfied?  ??? ? ? ?Blood Pressure Screening on??06/26/19.??Satisfied by Junior MADDOX, Nguyen Nunn  ?

## 2022-05-04 NOTE — HISTORICAL [facility] CERN[facility]
This is a historical note converted from Becca. Formatting and pictures may have been removed.  Please reference Becca for original formatting and attached multimedia. Chief Complaint  Patient has a vesicocutaneous fistula.? He also has?a pressure ulcer of the right heel.  History of Present Illness  See nurses notes  Review of Systems  See nurses notes  Physical Exam  Vitals & Measurements  T:?36.8? ?C (Oral)? HR:?85(Peripheral)? RR:?18? BP:?151/93? SpO2:?97%?  ?  Assessment/Plan  1.?Open wound of abdomen?S31.109A  ?Patient has had a small draining wound on the abdomen for approximately a year and a half.? 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 He states that an ilial loop was attempted approximately 2 years ago,?but they were unable to identify his ureters?and subsequently abandoned the procedure.? He currently has a suprapubic catheter?which apparently drains well, and he denies any urine flow from the fistula.? He states that fistula leakage ceased?when he changed his suprapubic catheter to a smaller caliber.  ?  We had a long conversation?regarding his options.? He asked about having the fistula closed and using a urethral catheter?during the healing process.? Since this is not my area of expertise,?I told him that I would?have a conversation with a urologist.? The urologist?had another suggestion, which was?percutaneous nephrostomies.? I will call the patient and discuss these options with him.  ?  He also has a small decubitus ulcer on the right heel. ?Will use?Lor and Mepilex cover dressing, changing every 2 days. ?Return in 1 week.  2.?Chronic skin ulcer?L98.499  Orders:  Wound Care Outpatient, *Est. 07/24/19 3:00:00 CDT, *Est. Stop date 07/24/19 3:00:00 CDT, [facility], FU with Physician in 1 week  Wound Care Team Treatment, 07/17/19 12:10:00 CDT, Stop date 07/17/19 12:10:00 CDT, Lor R heel q2 d. cover dressing to fistula   Problem List/Past Medical  History  Ongoing  Chronic skin ulcer  DM (diabetes mellitus)  Open wound of abdomen  Quadriplegia  Quadriplegic spinal paralysis  Historical  No qualifying data  Medications  Bactroban 2% topical ointment, 1 aruna, TOP, TID,? ?Not Taking, Completed Rx  BASAGLAR INJ 100UNIT  Bydureon BCise 2 mg/0.85 mL subcutaneous suspension, extended release  Ciprofloxacin Hcl 500 Mg Tab, 500 mg= 1 tab(s), BID,? ?Not Taking, Completed Rx  clindamycin 150 mg oral capsule, 300 mg= 2 cap(s), Oral, q6hr,? ?Not Taking, Completed Rx  GLIPIZIDE TAB 10MG, 10 mg= 1 tab(s), Oral, BID,? ?Not Taking per Prescriber  JANUVIA TAB 100MG, 100 mg= 1 tab(s), Oral, Daily,? ?Not Taking per Prescriber  Ketoconazole 2% Shampoo,? ?Not Taking, Completed Rx  Levofloxacin 500 Mg Tablet, 500 mg= 1 tab(s), Oral, Daily,? ?Not Taking, Completed Rx  MUPIROCIN OIN 2%,? ?Not taking  MYRBETRIQ TAB 50MG, 50 mg= 1 tab(s), Oral, Daily  OXYBUTYNIN TAB 5MG,? ?Not Taking per Prescriber  ReliOn/NovoLIN R 100 units/mL injectable solution  Tramadol Hcl Tab 50 Mg, 50 mg= 1 tab(s), Oral, q4-6hr  Allergies  No Known Allergies  Social History  Tobacco  Never smoker, 05/01/2017  Health Maintenance  Health Maintenance  ???Pending?(in the next year)  ??? ??OverDue  ??? ? ? ?Alcohol Misuse Screening due??01/01/19??and every 1??year(s)  ??? ? ? ?Obesity Screening due??01/01/19??and every 1??year(s)  ??? ? ? ?ADL Screening due??05/30/19??and every 1??year(s)  ??? ??Due?  ??? ? ? ?Aspirin Therapy for CVD Prevention due??07/17/19??and every 1??year(s)  ??? ? ? ?Tetanus Vaccine due??07/17/19??and every 10??year(s)  ???Satisfied?(in the past 1 year)  ??? ??Satisfied?  ??? ? ? ?Blood Pressure Screening on??07/17/19.??Satisfied by Junior MADDOX, Nguyen Nunn  ?

## 2022-05-04 NOTE — HISTORICAL [facility] CERNER
This is a historical note converted from Becca. Formatting and pictures may have been removed.  Please reference Becca for original formatting and attached multimedia. History of Present Illness  55y/o male with hx of right heel ulcer and now a lateral ulcer in the right foot.  Review of Systems  no complains  Physical Exam  Vitals & Measurements  T:?36.6? ?C (Oral)? HR:?125(Peripheral)? RR:?18? BP:?118/89? SpO2:?98%?  ?  Rheel wound is better. His laetral wound is healing also.  Assessment/Plan  1.?Pressure ulcer of right heel, stage 3?L89.613  Incision/Wounds  ?1. Heel Right Pressure ulcer?- last charted: 10/28/2021 11:20  ?? ??Assessment Done By?- Wound Care Team  ?? ??Pressure Point?- Bony prominence  ?? ??Dressing Type?- ABD dressing pad, Gauze dressing, Medicated packing strips, Rolled Gauze, Tape, Tubular bandage  ?? ??Dressing Assessment?- Drainage present, Intact  ?? ??Dressing Activity?- Changed  ?? ??Cleansing?- Cleaned with normal saline  ?? ??Length?- 4.7 cm  ?? ??Width?- 3.5 cm  ?? ??Depth?- 0.1 cm  ?? ??Wound Bed Tissue Type?- Granulating  ?? ??Pressure Ulcer Stage?- III  ?? ??Exudate Amount?- Moderate  ?? ??Exudate Type?- Serosanguineous  ?? ??Exudate Odor?- None  ?? ??Edge?- Well defined  ?? ??Surrounding Tissue Color?- Normal  ?? ??Surrounding Tissue Condition?- Callus, Intact  ?? ??Status?- Improving  ?  ?2. Toe Right Anterior Trauma?- last charted: 10/28/2021 11:20  ?? ??Assessment Done By?- Wound Care Team  ?? ??Rash Distribution?- Localized  ?? ??Pressure Point?- Bony prominence  ?? ??Dressing Type?- None  ?? ??Cleansing?- Cleaned with normal saline  ?? ??Length?- 0.2 cm  ?? ??Width?- 0.4 cm  ?? ??Wound Bed Tissue Type?- Scab  ?? ??Exudate Amount?- None  ?? ??Exudate Odor?- None  ?? ??Edge?- Well defined  ?? ??Surrounding Tissue Color?- Normal  ?? ??Surrounding Tissue Condition?- Callus  ?  ?3. Foot Right Lateral Pressure ulcer?- last charted: 10/28/2021 11:20  ?? ??Assessment Done By?- Wound  Care Team  ?? ??Abnormality Pattern?- Flat  ?? ??Abnormality Color?- Red, Yellow  ?? ??Pressure Point?- Bony prominence  ?? ??Dressing Type?- Foam  ?? ??Dressing Assessment?- Drainage present, Intact  ?? ??Dressing Activity?- Changed  ?? ??Cleansing?- Commercial cleansing solution  ?? ??Length?- 0.9 cm  ?? ??Width?- 1.3 cm  ?? ??Depth?- 0.1 cm  ?? ??Wound Bed Tissue Type?- Fibrotic, Pale Pink  ?? ??Exudate Amount?- Scant  ?? ??Exudate Type?- Serosanguineous  ?? ??Exudate Odor?- None  ?? ??Edge?- Well defined  ?? ??Surrounding Tissue Color?- Normal  ?? ??Surrounding Tissue Condition?- Intact  ?? ??Status?- Improving  ?? ??Topical Agent Application?- Ointment  ?Pt examined and notes review above. Continue mesalt/gauze to R heel wound and mesalt/foam to his lateral wound daily. Tubigrip double layer to his right foot/leg. RTC in 2 weeks in a tuesday.  Ordered:  Wound Care Outpatient, *Est. 11/11/21 3:00:00 CST, *Est. Stop date 11/11/21 3:00:00 CST, [facility], Return to Clinic 2 weeks on tuesday  Wound Care Team Treatment, 10/28/21 11:43:00 CDT, Stop date 10/28/21 11:43:00 CDT, Continue mesalt/gauze/abd/kerlix in the R heel and mesalt /Foam to lateral wound daily and tubigrip E(double ) to Right foot/leg. RTC in 2 weeks in a tuesday.  ?  2.?Lymphedema of leg?I89.0  Ordered:  Wound Care Outpatient, *Est. 11/11/21 3:00:00 CST, *Est. Stop date 11/11/21 3:00:00 CST, [facility], Return to Clinic 2 weeks on tuesday  Wound Care Team Treatment, 10/28/21 11:43:00 CDT, Stop date 10/28/21 11:43:00 CDT, Continue mesalt/gauze/abd/kerlix in the R heel and mesalt /Foam to lateral wound daily and tubigrip E(double ) to Right foot/leg. RTC in 2 weeks in a tuesday.  ?  3.?Quadriplegia?G82.50  Ordered:  Wound Care Outpatient, *Est. 11/11/21 3:00:00 CST, *Est. Stop date 11/11/21 3:00:00 CST, [facility], Return to Clinic 2 weeks on tuesday  Wound Care Team Treatment, 10/28/21 11:43:00 CDT, Stop date 10/28/21  11:43:00 CDT, Continue mesalt/gauze/abd/kerlix in the R heel and mesalt /Foam to lateral wound daily and tubigrip E(double ) to Right foot/leg. RTC in 2 weeks in a tuesday.  ?  4.?Toe ulcer?L97.509  Ordered:  Wound Care Outpatient, *Est. 11/11/21 3:00:00 CST, *Est. Stop date 11/11/21 3:00:00 CST, [facility], Return to Clinic 2 weeks on tuesday  Wound Care Team Treatment, 10/28/21 11:43:00 CDT, Stop date 10/28/21 11:43:00 CDT, Continue mesalt/gauze/abd/kerlix in the R heel and mesalt /Foam to lateral wound daily and tubigrip E(double ) to Right foot/leg. RTC in 2 weeks in a tuesday.  ?  5.?Pressure ulcer with abrasion, blister, partial thickness skin loss involving epidermis and/or dermis?L89.92  Ordered:  Wound Care Outpatient, *Est. 11/11/21 3:00:00 CST, *Est. Stop date 11/11/21 3:00:00 CST, [facility], Return to Clinic 2 weeks on tuesday  Wound Care Team Treatment, 10/28/21 11:43:00 CDT, Stop date 10/28/21 11:43:00 CDT, Continue mesalt/gauze/abd/kerlix in the R heel and mesalt /Foam to lateral wound daily and tubigrip E(double ) to Right foot/leg. RTC in 2 weeks in a tuesday.  ?   Problem List/Past Medical History  Ongoing  Chronic skin ulcer  DM (diabetes mellitus)  Infected decubitus ulcer  Lymphedema of leg  Neurogenic bladder  Obesity  Open wound of abdomen  Pressure ulcer of heel  Pressure ulcer of right ischium  Quadriplegia  Quadriplegic spinal paralysis  Skin eschar  Historical  Pressure ulcer of right foot stage 3  Medications  Bactroban 2% topical ointment, 1 aruna, TOP, TID,? ?Not Taking, Completed Rx  BASAGLAR INJ 100UNIT  Bydureon BCise 2 mg/0.85 mL subcutaneous suspension, extended release,? ?Not Taking per Prescriber  Ciprofloxacin Hcl 500 Mg Tab, 500 mg= 1 tab(s), BID,? ?Not Taking, Completed Rx  clindamycin 150 mg oral capsule, 300 mg= 2 cap(s), Oral, q6hr,? ?Not Taking, Completed Rx  Eliquis Starter Pack for Treatment of DVT and PE 5 mg oral tablet, 5 mg= 1 tab(s), Oral,  BID  gentamicin 0.1% topical ointment, 1 aruna, TOP, Daily,? ?Not Taking, Completed Rx  gentamicin 0.1% topical ointment, 1 aruna, TOP, Daily, 2 refills  GLIPIZIDE TAB 10MG, 10 mg= 1 tab(s), Oral, BID,? ?Not Taking per Prescriber  JANUVIA TAB 100MG, 100 mg= 1 tab(s), Oral, Daily,? ?Not Taking per Prescriber  Januvia 50 mg oral tablet, 50 mg= 1 tab(s), Oral, Daily  Ketoconazole 2% Shampoo,? ?Not Taking, Completed Rx  Levofloxacin 500 Mg Tablet, 500 mg= 1 tab(s), Oral, Daily,? ?Not Taking, Completed Rx  metoprolol succinate 50 mg oral tablet, extended release, 50 mg= 1 tab(s), Oral, BID  MUPIROCIN OIN 2%,? ?Not taking  MYRBETRIQ TAB 50MG, 50 mg= 1 tab(s), Oral, Daily  OXYBUTYNIN TAB 5MG,? ?Not Taking per Prescriber  ReliOn/NovoLIN R 100 units/mL injectable solution  sulfamethoxazole-trimethoprim 800 mg-160 mg oral tablet, 1 tab(s), Oral, BID,? ?Not Taking, Completed Rx  Tramadol Hcl Tab 50 Mg, 50 mg= 1 tab(s), Oral, q4-6hr,? ?Not Taking, Completed Rx  Allergies  No Known Allergies  Social History  Abuse/Neglect  No, 05/19/2020  Tobacco  Never (less than 100 in lifetime), N/A, 05/19/2020  Never smoker, 05/01/2017  Health Maintenance  Health Maintenance  ???Pending?(in the next year)  ??? ??OverDue  ??? ? ? ?Obesity Screening due??01/01/21??and every 1??year(s)  ??? ? ? ?Alcohol Misuse Screening due??01/02/21??and every 1??year(s)  ??? ? ? ?ADL Screening due??05/19/21??and every 1??year(s)  ??? ??Due?  ??? ? ? ?Aspirin Therapy for CVD Prevention due??10/28/21??and every 1??year(s)  ??? ? ? ?Medicare Annual Wellness Exam due??10/28/21??and every 1??year(s)  ??? ? ? ?Tetanus Vaccine due??10/28/21??and every 10??year(s)  ???Satisfied?(in the past 1 year)  ??? ??Satisfied?  ??? ? ? ?Blood Pressure Screening on??10/28/21.??Satisfied by Sally MADDOX, Ca Sánchez  ?

## 2022-05-04 NOTE — HISTORICAL [facility]
This is a historical note converted from Becca. Formatting and pictures may have been removed.  Please reference Becca for original formatting and attached multimedia. History of Present Illness  The patient has a chronic?abdominal ulcer in the suprapubic area at the site of his?old suprapubic catheter.? He has been packing?the wound with?Mesalt?with assistance of his personal care attendant.? He offers no new complaints today.  Physical Exam  Vitals & Measurements  T:?36.4? ?C (Oral)? HR:?75(Peripheral)? RR:?18? BP:?160/102? SpO2:?96%?  ?  On examination he has a pinpoint?ulcer in the suprapubic area with?a small amount of serosanguineous drainage.? He has a suprapubic catheter?which appears to be functioning normally.? His right heel?decubitus ulcer has healed with a residual scab in place.  ?  Incision/Wounds  ?1. Abdomen Lower, Other: surgical incision?- last charted: 06/12/2019 11:20  ?? ??Assessment Done By?- Wound Care Team  ?? ??Dressing Type?- Gauze dressing, Medicated packing strips, Tape  ?? ??Dressing Assessment?- Drainage present, Intact  ?? ??Dressing Activity?- Changed  ?? ??Cleansing?- Cleaned with normal saline  ?? ??Length?- 0.2 cm  ?? ??Width?- 0.2 cm  ?? ??Depth?- 1.0 cm  ?? ??Wound Bed Tissue Type?- Granulating  ?? ??Percent Granulated?- 100 %  ?? ??Exudate Amount?- Small  ?? ??Exudate Type?- Serosanguineous  ?? ??Exudate Odor?- None  ?? ??Edge?- Well defined  ?? ??Surrounding Tissue Color?- Normal  ?? ??Surrounding Tissue Condition?- Intact  ?? ??Status?- Improving  ?  ?2. Foot Right Plantar Blister?- last charted: 06/12/2019 11:20  ?? ??Assessment Done By?- Wound Care Team  ?? ??Dressing Type?- None  ?? ??Cleansing?- Cleaned with normal saline  ?? ??Length?- 0.5 cm  ?? ??Width?- 0.7 cm  ?? ??Depth?- 0.1 cm  ?? ??Wound Bed Tissue Type?- Epithelialized, Scab  ?? ??Percent Epithelialized?- 100 %  ?? ??Exudate Odor?- None  ?? ??Edge?- Attached to wound bed  ?? ??Surrounding Tissue Color?-  Normal  ?? ??Surrounding Tissue Condition?- Intact  ?? ??Status?- Healed  ?  ?  Assessment/Plan  1.?Chronic skin ulcer?L98.499  ?Discontinue?Mesalt packing of the suprapubic?wound. ?Apply Band-Aid overlay?daily to every other day as needed.? Follow-up PRN?should there be any?sign of complications associated with wound healing.? Will reevaluate?wound in 2 weeks.  2.?Quadriplegic spinal paralysis?G82.50  ?Continue current care.  Orders:  Wound Care Outpatient, *Est. 06/26/19 3:00:00 CDT, *Est. Stop date 06/26/19 3:00:00 CDT, [facility], Return to Clinic 2 weeks  Wound Care Team Treatment, 06/12/19 11:40:00 CDT, Stop date 06/12/19 11:40:00 CDT, Discontinue Mesalt packing of the wound. Apply Band-Aid overlay at the wound site and change daily to every other day as needed.   Problem List/Past Medical History  Ongoing  Chronic skin ulcer  DM (diabetes mellitus)  Open wound of abdomen  Quadriplegia  Quadriplegic spinal paralysis  Historical  No qualifying data  Medications  Bactroban 2% topical ointment, 1 aruna, TOP, TID,? ?Not Taking, Completed Rx  BASAGLAR INJ 100UNIT  Ciprofloxacin Hcl 500 Mg Tab, 500 mg= 1 tab(s), BID,? ?Not Taking, Completed Rx  clindamycin 150 mg oral capsule, 300 mg= 2 cap(s), Oral, q6hr,? ?Not Taking, Completed Rx  GLIPIZIDE TAB 10MG, 10 mg= 1 tab(s), Oral, BID  JANUVIA TAB 100MG, 100 mg= 1 tab(s), Oral, Daily  Ketoconazole 2% Shampoo,? ?Not Taking, Completed Rx  Levofloxacin 500 Mg Tablet, 500 mg= 1 tab(s), Oral, Daily,? ?Not Taking, Completed Rx  MUPIROCIN OIN 2%,? ?Not taking  MYRBETRIQ TAB 50MG, 50 mg= 1 tab(s), Oral, Daily  OXYBUTYNIN TAB 5MG,? ?Not Taking per Prescriber  ReliOn/NovoLIN R 100 units/mL injectable solution  Tramadol Hcl Tab 50 Mg, 50 mg= 1 tab(s), Oral, q4-6hr  Allergies  No Known Allergies  Social History  Tobacco  Never smoker, 05/11/2017  Health Maintenance  Health Maintenance  ???Pending?(in the next year)  ??? ??OverDue  ??? ? ? ?Alcohol Misuse Screening  due??01/01/19??and every 1??year(s)  ??? ? ? ?Obesity Screening due??01/01/19??and every 1??year(s)  ??? ? ? ?ADL Screening due??05/30/19??and every 1??year(s)  ??? ??Due?  ??? ? ? ?Aspirin Therapy for CVD Prevention due??06/12/19??and every 1??year(s)  ??? ? ? ?Tetanus Vaccine due??06/12/19??and every 10??year(s)  ???Satisfied?(in the past 1 year)  ??? ??Satisfied?  ??? ? ? ?Blood Pressure Screening on??06/12/19.??Satisfied by Jolynn Madrid LPN  ?  ?

## 2022-05-04 NOTE — HISTORICAL [facility] C[facility]
This is a historical note converted from Becca. Formatting and pictures may have been removed.  Please reference Becca for original formatting and attached multimedia. Chief Complaint  C/O recurring pressure ulcer to heel  Rt. buttock, ?ischial ulcer?  History of Present Illness  The patient completed a course of Bactrim for an abscess involving his left earlobe.? Despite recent antibiotic treatment he continues to have?intermittent drainage from the earlobe.? His caregivers?continue to?assist with?his left ischial?pressure ulcers.? There is been no reported?drainage or?erythema.? Mesalt and?medical honey?have been applied to?the ischio wound sites.  Review of Systems  Not significantly different than the history of present and past medical illnesses.  Physical Exam  Vitals & Measurements  T:?36.7? ?C (Oral)? HR:?132(Peripheral)? RR:?18? BP:?99/69? SpO2:?96%?  HT:?172.7?cm? WT:?102?kg?  On examination he has?soft tissue swelling involving the left earlobe?with?a small abscess?involving the posterior auricular area?with scant?sanguinous drainage.? There is no evidence of?posterior auricular adenopathy.  ?  Inspection of his right issue him?reveals?2 ulcers?with good granulation tissue at the base.? There are several other?shallow?areas of?thin skin eschar, scabbing and desquamation.  ?  Incision/Wounds  ?1. Right Other: ischial pressure ulcer?- last charted: 08/18/2020 11:35  ?? ??Assessment Done By?- Wound Care Team  ?? ??Pressure Point?- Bony prominence  ?? ??Dressing Type?- Gauze dressing, Honey, Medicated packing strips, Tape  ?? ??Dressing Assessment?- Drainage present, Intact  ?? ??Dressing Activity?- Changed  ?? ??Cleansing?- Cleaned with normal saline  ?? ??Length?- 1.2 cm  ?? ??Width?- 1.3 cm  ?? ??Depth?- 0.9 cm  ?? ??Wound Bed Tissue Type?- Friable, Granulating, Pale Pink  ?? ??Percent Granulated?- 100 %  ?? ??Pressure Ulcer Stage?- III  ?? ??Undermining Location?- 12-2 oclock  ?? ??Undermining Depth?-  1.0  ?? ??Exudate Amount?- Moderate  ?? ??Exudate Type?- Serosanguineous  ?? ??Exudate Odor?- None  ?? ??Edge?- Well defined  ?? ??Surrounding Tissue Color?- Erythema  ?? ??Surrounding Tissue Condition?- Friable, Intact  ?? ??Status?- Improving  ?? ??Topical Agent Application?- Gel  ?  ?2. Buttock Right Inner?- last charted: 08/18/2020 11:35  ?? ??Assessment Done By?- Wound Care Team  ?? ??Abnormality Pattern?- Clustered  ?? ??Pressure Point?- None  ?? ??Dressing Type?- Absorptive, Honey, Medicated packing strips, Tape  ?? ??Dressing Assessment?- Drainage present, Intact  ?? ??Dressing Activity?- Removed  ?? ??Cleansing?- Cleaned with normal saline  ?? ??Length?- 1.5 cm  ?? ??Width?- 1.5 cm  ?? ??Depth?- 0.1 cm  ?? ??Wound Bed Tissue Type?- Epithelialized, Erythema, Friable, Granulating, Necrotic tissue, eschar  ?? ??Pressure Ulcer Stage?- Unstageable  ?? ??Exudate Amount?- Moderate  ?? ??Exudate Type?- Serosanguineous  ?? ??Exudate Odor?- None  ?? ??Edge?- Poorly defined  ?? ??Surrounding Tissue Color?- Erythema  ?? ??Surrounding Tissue Condition?- Excoriated, Friable  ?? ??Status?- Improving  ?? ??Topical Agent Application?- Gel  ?  ?3. Ear Left Posterior Abcess?- last charted: 08/18/2020 11:36  ?? ??Assessment Done By?- Wound Care Team  ?? ??Abnormality Pattern?- Palpable, Raised  ?? ??Dressing Type?- None  ?? ??Cleansing?- Cleaned with normal saline  ?? ??Length?- 0.5 cm  ?? ??Width?- 8 cm  ?? ??Wound Bed Tissue Type?- Boggy, Epithelialized  ?? ??Exudate Amount?- Scant  ?? ??Exudate Type?- Sanguineous  ?? ??Exudate Odor?- None  ?? ??Edge?- Approximated  ?? ??Surrounding Tissue Color?- Erythema  ?? ??Surrounding Tissue Condition?- Intact  ?? ??Status?- Improving  ?   Laboratory data:?Wound CNS on 8/6/2020 revealed?MSSA and enterococcus.  ?  Assessment/Plan  1.?Abscess, earlobe?H60.00  ?The patient was advised that repeat?I&D of the?left postauricular abscess was indicated.? After having obtained informed consent  the area was premedicated with?4% topical lidocaine. ?Thereafter?incision and drainage was accomplished using a #11 blade. ?The?incision was?extended?laterally on each side.??A Q-tip was used to?explore?the wound site for?evidence of loculated pus?and this was not?evident.? The wound was then packed with?Mesalt.? The caregiver has been advised to leave the Mesalt packing in place for 3 days at which time it can be removed.? Bactrim?DS 1 twice daily will be continued for an additional 5 days of therapy.  2.?Pressure ulcer of right ischium?L89.319  ?Continue modalities as documented below.  3.?Pressure ulcer of heel?L89.609  4.?Skin eschar?R23.4  ?Continue current wound care.  5.?Neurogenic bladder?N31.9  6.?DM (diabetes mellitus)?E11.9  ?Tight control of diabetes is recommended.  Orders:  sulfamethoxazole-trimethoprim, 1 tab(s), Oral, BID, For infection, X 7 day(s), # 14 tab(s), 0 Refill(s), Pharmacy: [facility], Patient Verbalizes Understanding, 172.7, cm, Height/Length Dosing, 05/19/20 14:26:00 CDT, 102, kg, Weight Dosing, 05/19/20 14:26:00 CDT  Wound Care Outpatient, *Est. 08/25/20 3:00:00 CDT, *Est. Stop date 08/25/20 3:00:00 CDT, [facility], FU with Physician in 1 week  Wound Care Team Treatment, 08/18/20 12:17:00 CDT, Stop date 08/18/20 12:17:00 CDT, Leave Mesalt packing in left posterior earlobe abscess in place for 3 days and do not reinsert. Use Bactrim as prescribed. Continue medical honey and Mesalt to right ischio wounds and change da...   Problem List/Past Medical History  Ongoing  Chronic skin ulcer  DM (diabetes mellitus)  Neurogenic bladder  Obesity  Open wound of abdomen  Pressure ulcer of heel  Pressure ulcer of right ischium  Quadriplegia  Quadriplegic spinal paralysis  Skin eschar  Historical  Pressure ulcer of right foot stage 3  Medications  Bactrim  mg-160 mg oral tablet, 1 tab(s), Oral, BID  Bactroban 2% topical ointment, 1 aruna, TOP, TID,? ?Not Taking, Completed  Rx  BASAGLAR INJ 100UNIT  Bydureon BCise 2 mg/0.85 mL subcutaneous suspension, extended release,? ?Not Taking per Prescriber  Ciprofloxacin Hcl 500 Mg Tab, 500 mg= 1 tab(s), BID,? ?Not Taking, Completed Rx  clindamycin 150 mg oral capsule, 300 mg= 2 cap(s), Oral, q6hr,? ?Not Taking, Completed Rx  gentamicin 0.1% topical ointment, 1 aruna, TOP, Daily,? ?Not Taking, Completed Rx  gentamicin 0.1% topical ointment, 1 aruna, TOP, Daily, 2 refills  GLIPIZIDE TAB 10MG, 10 mg= 1 tab(s), Oral, BID,? ?Not Taking per Prescriber  JANUVIA TAB 100MG, 100 mg= 1 tab(s), Oral, Daily,? ?Not Taking per Prescriber  Januvia 50 mg oral tablet, 50 mg= 1 tab(s), Oral, Daily  Ketoconazole 2% Shampoo,? ?Not Taking, Completed Rx  Levofloxacin 500 Mg Tablet, 500 mg= 1 tab(s), Oral, Daily,? ?Not Taking, Completed Rx  MUPIROCIN OIN 2%,? ?Not taking  MYRBETRIQ TAB 50MG, 50 mg= 1 tab(s), Oral, Daily  OXYBUTYNIN TAB 5MG,? ?Not Taking per Prescriber  ReliOn/NovoLIN R 100 units/mL injectable solution  sulfamethoxazole-trimethoprim 800 mg-160 mg oral tablet, 1 tab(s), Oral, BID,? ?Not Taking, Completed Rx  Tramadol Hcl Tab 50 Mg, 50 mg= 1 tab(s), Oral, q4-6hr,? ?Not Taking, Completed Rx  Allergies  No Known Allergies  Social History  Abuse/Neglect  No, 05/19/2020  Tobacco  Never (less than 100 in lifetime), N/A, 05/19/2020  Never smoker, 05/01/2017  Health Maintenance  Health Maintenance  ???Pending?(in the next year)  ??? ??OverDue  ??? ? ? ?Obesity Screening due??01/01/20??and every 1??year(s)  ??? ? ? ?Alcohol Misuse Screening due??01/02/20??and every 1??year(s)  ??? ??Due?  ??? ? ? ?Aspirin Therapy for CVD Prevention due??08/18/20??and every 1??year(s)  ??? ? ? ?Medicare Annual Wellness Exam due??08/18/20??and every 1??year(s)  ??? ? ? ?Tetanus Vaccine due??08/18/20??and every 10??year(s)  ??? ??Due In Future?  ??? ? ? ?ADL Screening not due until??05/19/21??and every 1??year(s)  ???Satisfied?(in the past 1 year)  ??? ??Satisfied?  ??? ? ? ?ADL  Screening on??05/19/20.??Satisfied by Jolynn Madrid LPN  ??? ? ? ?Blood Pressure Screening on??08/18/20.??Satisfied by Jolynn Madrid LPN  ?

## 2022-05-04 NOTE — HISTORICAL [facility]
This is a historical note converted from Becca. Formatting and pictures may have been removed.  Please reference Becca for original formatting and attached multimedia. History of Present Illness  R heel ulcer/ vein insufficiency Rlext.  Review of Systems  see nurse notes.  Physical Exam  Vitals & Measurements  T:?36.5? ?C (Oral)? HR:?128(Peripheral)? RR:?18? BP:?125/88? SpO2:?97%?  ?  Rlext : foot is swollen 3+ making the wound difficult to heal. Wound is looking good with granulation tissue.  Assessment/Plan  1.?Pressure ulcer of right heel, stage 3?L89.613  Incision/Wounds  ?1. Heel Right Pressure ulcer?- last charted: 08/10/2021 11:04  ?? ??Assessment Done By?- Wound Care Team  ?? ??Pressure Point?- Bony prominence  ?? ??Dressing Type?- ABD dressing pad, Gauze dressing, Honey, Medicated packing strips, Rolled Gauze, Tape  ?? ??Dressing Assessment?- Drainage present, Intact  ?? ??Dressing Activity?- Removed  ?? ??Cleansing?- Cleaned with normal saline  ?? ??Length?- 3.0 cm  ?? ??Width?- 5.5 cm  ?? ??Depth?- 0.1 cm  ?? ??Wound Bed Tissue Type?- Granulating, Necrotic tissue, slough, Pale Pink, Scab, Stringy  ?? ??Pressure Ulcer Stage?- III  ?? ??Exudate Amount?- Small  ?? ??Exudate Type?- Serosanguineous  ?? ??Exudate Odor?- None  ?? ??Edge?- Well defined  ?? ??Surrounding Tissue Color?- Normal  ?? ??Surrounding Tissue Condition?- Dry, Intact  ?? ??Status?- Improving  ?Pt examined and notes review . Rlext swollen 3+ in his r foot. Wound is looking good with granulation tissue and some slough. Plans is to continue collagen silver QOD and double layer tubigrip E. Consult [facility] for vein insufficiency. The pt had a DVT in his Rlext 30 years ago and since then he have problems with the Leg. RTC in 2 weeks.  Ordered:  Wound Care Outpatient, *Est. 08/24/21 3:00:00 CDT, *Est. Stop date 08/24/21 3:00:00 CDT, [facility], Return to Clinic 2 weeks  Wound Care Team Treatment, 08/10/21 11:21:00 CDT, Stop date 08/10/21  11:21:00 CDT, Continue collagen with silver QOD, tubigrip double E. Plans is to consult [facility] for vein insufficiency. RTC in 2 weeks.  ?  2.?Lymphedema of leg?I89.0  Ordered:  Wound Care Outpatient, *Est. 08/24/21 3:00:00 CDT, *Est. Stop date 08/24/21 3:00:00 CDT, [facility], Return to Clinic 2 weeks  Wound Care Team Treatment, 08/10/21 11:21:00 CDT, Stop date 08/10/21 11:21:00 CDT, Continue collagen with silver QOD, tubigrip double E. Plans is to consult [facility] for vein insufficiency. RTC in 2 weeks.  ?  3.?Quadriplegia?G82.50  Ordered:  Wound Care Outpatient, *Est. 08/24/21 3:00:00 CDT, *Est. Stop date 08/24/21 3:00:00 CDT, [facility], Return to Clinic 2 weeks  Wound Care Team Treatment, 08/10/21 11:21:00 CDT, Stop date 08/10/21 11:21:00 CDT, Continue collagen with silver QOD, tubigrip double E. Plans is to consult [facility] for vein insufficiency. RTC in 2 weeks.  ?   Problem List/Past Medical History  Ongoing  Chronic skin ulcer  DM (diabetes mellitus)  Infected decubitus ulcer  Lymphedema of leg  Neurogenic bladder  Obesity  Open wound of abdomen  Pressure ulcer of heel  Pressure ulcer of right ischium  Quadriplegia  Quadriplegic spinal paralysis  Skin eschar  Historical  Pressure ulcer of right foot stage 3  Medications  Bactroban 2% topical ointment, 1 aruna, TOP, TID,? ?Not Taking, Completed Rx  BASAGLAR INJ 100UNIT  Bydureon BCise 2 mg/0.85 mL subcutaneous suspension, extended release,? ?Not Taking per Prescriber  Ciprofloxacin Hcl 500 Mg Tab, 500 mg= 1 tab(s), BID,? ?Not Taking, Completed Rx  clindamycin 150 mg oral capsule, 300 mg= 2 cap(s), Oral, q6hr,? ?Not Taking, Completed Rx  gentamicin 0.1% topical ointment, 1 aruna, TOP, Daily,? ?Not Taking, Completed Rx  gentamicin 0.1% topical ointment, 1 aruna, TOP, Daily, 2 refills  GLIPIZIDE TAB 10MG, 10 mg= 1 tab(s), Oral, BID,? ?Not Taking per Prescriber  JANUVIA TAB 100MG, 100 mg= 1 tab(s), Oral, Daily,? ?Not Taking per Prescriber  Januvia 50 mg oral  tablet, 50 mg= 1 tab(s), Oral, Daily  Ketoconazole 2% Shampoo,? ?Not Taking, Completed Rx  Levofloxacin 500 Mg Tablet, 500 mg= 1 tab(s), Oral, Daily,? ?Not Taking, Completed Rx  MUPIROCIN OIN 2%,? ?Not taking  MYRBETRIQ TAB 50MG, 50 mg= 1 tab(s), Oral, Daily  OXYBUTYNIN TAB 5MG,? ?Not Taking per Prescriber  ReliOn/NovoLIN R 100 units/mL injectable solution  sulfamethoxazole-trimethoprim 800 mg-160 mg oral tablet, 1 tab(s), Oral, BID,? ?Not Taking, Completed Rx  Tramadol Hcl Tab 50 Mg, 50 mg= 1 tab(s), Oral, q4-6hr,? ?Not Taking, Completed Rx  Allergies  No Known Allergies  Social History  Abuse/Neglect  No, 05/19/2020  Tobacco  Never (less than 100 in lifetime), N/A, 05/19/2020  Never smoker, 05/01/2017  Health Maintenance  Health Maintenance  ???Pending?(in the next year)  ??? ??OverDue  ??? ? ? ?Obesity Screening due??01/01/21??and every 1??year(s)  ??? ? ? ?Alcohol Misuse Screening due??01/02/21??and every 1??year(s)  ??? ? ? ?ADL Screening due??05/19/21??and every 1??year(s)  ??? ??Due?  ??? ? ? ?Aspirin Therapy for CVD Prevention due??08/10/21??and every 1??year(s)  ??? ? ? ?Medicare Annual Wellness Exam due??08/10/21??and every 1??year(s)  ??? ? ? ?Tetanus Vaccine due??08/10/21??and every 10??year(s)  ???Satisfied?(in the past 1 year)  ??? ??Satisfied?  ??? ? ? ?Blood Pressure Screening on??08/10/21.??Satisfied by VERONICA Tim, Evie  ?

## 2022-05-04 NOTE — HISTORICAL [facility]
This is a historical note converted from Becca. Formatting and pictures may have been removed.  Please reference Becca for original formatting and attached multimedia. History of Present Illness  Incision/Wounds  ?1. Heel Right Pressure ulcer?- last charted: 10/21/2021 11:15  ?? ??Assessment Done By?- Wound Care Team  ?? ??Pressure Point?- Bony prominence  ?? ??Dressing Type?- ABD dressing pad, Gauze dressing, Medicated packing strips, Rolled Gauze, Tape, Tubular bandage  ?? ??Dressing Assessment?- Drainage present, Intact  ?? ??Dressing Activity?- Changed  ?? ??Cleansing?- Cleaned with normal saline  ?? ??Length?- 3.3 cm  ?? ??Width?- 7.3 cm  ?? ??Depth?- 0.1 cm  ?? ??Wound Bed Tissue Type?- Epithelialized, Fibrotic, Granulating  ?? ??Percent Granulated?- 80 %  ?? ??Percent Epithelialized?- 20 %  ?? ??Pressure Ulcer Stage?- III  ?? ??Exudate Amount?- Moderate  ?? ??Exudate Type?- Serosanguineous  ?? ??Exudate Odor?- None  ?? ??Edge?- Well defined  ?? ??Surrounding Tissue Color?- Normal  ?? ??Surrounding Tissue Condition?- Intact  ?? ??Status?- Improving  ?  ?2. Toe Right Anterior Trauma?- last charted: 10/21/2021 11:15  ?? ??Assessment Done By?- Wound Care Team  ?? ??Rash Distribution?- Localized  ?? ??Pressure Point?- Bony prominence  ?? ??Dressing Type?- None  ?? ??Cleansing?- Cleaned with normal saline  ?? ??Length?- 0.1 cm  ?? ??Width?- 0.4 cm  ?? ??Wound Bed Tissue Type?- Scab  ?? ??Exudate Amount?- None  ?? ??Exudate Odor?- None  ?? ??Edge?- Well defined  ?? ??Surrounding Tissue Color?- Normal  ?? ??Surrounding Tissue Condition?- Callus  ?  ?3. Foot Right Lateral Pressure ulcer?- last charted: 10/21/2021 11:15  ?? ??Assessment Done By?- Wound Care Team  ?? ??Abnormality Pattern?- Flat  ?? ??Abnormality Color?- Purple, Red  ?? ??Pressure Point?- Bony prominence  ?? ??Dressing Type?- Foam  ?? ??Dressing Activity?- Applied  ?? ??Cleansing?- Commercial cleansing solution  ?? ??Length?- 1.5 cm  ?? ??Width?- 2.5  cm  ?? ??Wound Bed Tissue Type?- Blister, Ecchymotic  ?? ??Exudate Amount?- None  ?? ??Exudate Odor?- None  ?? ??Edge?- Well defined  ?? ??Surrounding Tissue Color?- Normal  ?? ??Surrounding Tissue Condition?- Intact  ?? ??Topical Agent Application?- Ointment  Today?patient is being followed for?right heel ulceration.? This was cleaned today with saline.? A slight bit of?tissue was removed?but no real debridement done.? Area is improving?well.? Area was cleaned and Mesalt?gauze?ABD pads and Kerlix?were applied.? This is to be done daily.? Right fifth toe is healing well?and it was cleaned with normal saline.? Band-Aid will be applied daily.? There is a new blister?with?some?dark?blood possible hematoma underneath.? The blister was unroofed?with no fluid?and the area underneath appears to be hematoma.? This was cleaned?and Neosporin and foam dressing will be put on daily.? Before leaving?double layered?Tubigrip E was applied to the right lower extremity.? Patient will return in 1 week.  Physical Exam  Vitals & Measurements  T:?36.5? ?C (Oral)? HR:?131(Peripheral)? RR:?18? BP:?106/70? SpO2:?99%?  ?  Assessment/Plan  1.?Pressure ulcer of right heel, stage 3?L89.613  Ordered:  Wound Care Outpatient, *Est. 10/28/21 3:00:00 CDT, *Est. Stop date 10/28/21 3:00:00 CDT, [facility], FU with Physician in 1 week  Wound Care Team Treatment, 10/21/21 12:22:00 CDT, Stop date 10/21/21 12:22:00 CDT, Right fifth toe with Band-Aid daily. Right lateral foot ulcer with Neosporin and foam daily right heel clean daily with normal saline and apply Mesalt gauze ABD and Kerlix. Return in 1 week.  ?  2.?Lymphedema of leg?I89.0  Ordered:  Wound Care Outpatient, *Est. 10/28/21 3:00:00 CDT, *Est. Stop date 10/28/21 3:00:00 CDT, [facility], FU with Physician in 1 week  Wound Care Team Treatment, 10/21/21 12:22:00 CDT, Stop date 10/21/21 12:22:00 CDT, Right fifth toe with Band-Aid daily. Right lateral foot ulcer with Neosporin and  foam daily right heel clean daily with normal saline and apply Mesalt gauze ABD and Kerlix. Return in 1 week.  ?  3.?Quadriplegia?G82.50  Ordered:  Wound Care Outpatient, *Est. 10/28/21 3:00:00 CDT, *Est. Stop date 10/28/21 3:00:00 CDT, [facility], FU with Physician in 1 week  Wound Care Team Treatment, 10/21/21 12:22:00 CDT, Stop date 10/21/21 12:22:00 CDT, Right fifth toe with Band-Aid daily. Right lateral foot ulcer with Neosporin and foam daily right heel clean daily with normal saline and apply Mesalt gauze ABD and Kerlix. Return in 1 week.  ?  4.?Toe ulcer?L97.509  Ordered:  Wound Care Outpatient, *Est. 10/28/21 3:00:00 CDT, *Est. Stop date 10/28/21 3:00:00 CDT, [facility], FU with Physician in 1 week  Wound Care Team Treatment, 10/21/21 12:22:00 CDT, Stop date 10/21/21 12:22:00 CDT, Right fifth toe with Band-Aid daily. Right lateral foot ulcer with Neosporin and foam daily right heel clean daily with normal saline and apply Mesalt gauze ABD and Kerlix. Return in 1 week.  ?  5.?Pressure ulcer with abrasion, blister, partial thickness skin loss involving epidermis and/or dermis?L89.92  Ordered:  Wound Care Outpatient, *Est. 10/28/21 3:00:00 CDT, *Est. Stop date 10/28/21 3:00:00 CDT, [facility], FU with Physician in 1 week  Wound Care Team Treatment, 10/21/21 12:22:00 CDT, Stop date 10/21/21 12:22:00 CDT, Right fifth toe with Band-Aid daily. Right lateral foot ulcer with Neosporin and foam daily right heel clean daily with normal saline and apply Mesalt gauze ABD and Kerlix. Return in 1 week.  ?   Problem List/Past Medical History  Ongoing  Chronic skin ulcer  DM (diabetes mellitus)  Infected decubitus ulcer  Lymphedema of leg  Neurogenic bladder  Obesity  Open wound of abdomen  Pressure ulcer of heel  Pressure ulcer of right ischium  Quadriplegia  Quadriplegic spinal paralysis  Skin eschar  Historical  Pressure ulcer of right foot stage 3  Medications  Bactroban 2% topical ointment, 1  aruna, TOP, TID,? ?Not Taking, Completed Rx  BASAGLAR INJ 100UNIT  Bydureon BCise 2 mg/0.85 mL subcutaneous suspension, extended release,? ?Not Taking per Prescriber  Ciprofloxacin Hcl 500 Mg Tab, 500 mg= 1 tab(s), BID,? ?Not Taking, Completed Rx  clindamycin 150 mg oral capsule, 300 mg= 2 cap(s), Oral, q6hr,? ?Not Taking, Completed Rx  Eliquis Starter Pack for Treatment of DVT and PE 5 mg oral tablet, 5 mg= 1 tab(s), Oral, BID  gentamicin 0.1% topical ointment, 1 aruna, TOP, Daily,? ?Not Taking, Completed Rx  gentamicin 0.1% topical ointment, 1 aruna, TOP, Daily, 2 refills  GLIPIZIDE TAB 10MG, 10 mg= 1 tab(s), Oral, BID,? ?Not Taking per Prescriber  JANUVIA TAB 100MG, 100 mg= 1 tab(s), Oral, Daily,? ?Not Taking per Prescriber  Januvia 50 mg oral tablet, 50 mg= 1 tab(s), Oral, Daily  Ketoconazole 2% Shampoo,? ?Not Taking, Completed Rx  Levofloxacin 500 Mg Tablet, 500 mg= 1 tab(s), Oral, Daily,? ?Not Taking, Completed Rx  metoprolol succinate 50 mg oral tablet, extended release, 50 mg= 1 tab(s), Oral, BID  MUPIROCIN OIN 2%,? ?Not taking  MYRBETRIQ TAB 50MG, 50 mg= 1 tab(s), Oral, Daily  OXYBUTYNIN TAB 5MG,? ?Not Taking per Prescriber  ReliOn/NovoLIN R 100 units/mL injectable solution  sulfamethoxazole-trimethoprim 800 mg-160 mg oral tablet, 1 tab(s), Oral, BID,? ?Not Taking, Completed Rx  Tramadol Hcl Tab 50 Mg, 50 mg= 1 tab(s), Oral, q4-6hr,? ?Not Taking, Completed Rx  Allergies  No Known Allergies  Social History  Abuse/Neglect  No, 05/19/2020  Tobacco  Never (less than 100 in lifetime), N/A, 05/19/2020  Never smoker, 05/01/2017  Health Maintenance  Health Maintenance  ???Pending?(in the next year)  ??? ??OverDue  ??? ? ? ?Obesity Screening due??01/01/21??and every 1??year(s)  ??? ? ? ?Alcohol Misuse Screening due??01/02/21??and every 1??year(s)  ??? ? ? ?ADL Screening due??05/19/21??and every 1??year(s)  ??? ??Due?  ??? ? ? ?Aspirin Therapy for CVD Prevention due??10/21/21??and every 1??year(s)  ??? ? ? ?Medicare Annual  Wellness Exam due??10/21/21??and every 1??year(s)  ??? ? ? ?Tetanus Vaccine due??10/21/21??and every 10??year(s)  ???Satisfied?(in the past 1 year)  ??? ??Satisfied?  ??? ? ? ?Blood Pressure Screening on??10/21/21.??Satisfied by Sally MADDOX, Ca Sánchez  ?

## 2022-05-04 NOTE — HISTORICAL [facility] CERN[facility]
This is a historical note converted from Becca. Formatting and pictures may have been removed.  Please reference Becca for original formatting and attached multimedia. History of Present Illness  see nurse notes . Pt with chronic skin ulcer. Today he presented with a new ulcer in the?anterior ankle unstageable /pressure ulcer.  Review of Systems  no complains  Physical Exam  Vitals & Measurements  T:?36.8? ?C (Oral)? HR:?96(Peripheral)? RR:?18? BP:?124/89? SpO2:?99%?  ?  wound of the r heel is doing good.  Assessment/Plan  1.?Pressure ulcer of right heel, stage 3?L89.613  ?Incision/Wounds  ?1. Heel Right Pressure ulcer?- last charted: 11/23/2021 11:16  ?? ??Assessment Done By?- Wound Care Team  ?? ??Pressure Point?- Bony prominence  ?? ??Dressing Type?- ABD dressing pad, Collagen, Gauze dressing, Rolled Gauze, Tape, Tubular bandage  ?? ??Dressing Assessment?- Drainage present, Intact  ?? ??Dressing Activity?- Changed  ?? ??Cleansing?- Cleaned with normal saline  ?? ??Length?- 6.0 cm  ?? ??Width?- 4.0 cm  ?? ??Depth?- 0.1 cm  ?? ??Wound Bed Tissue Type?- Ecchymotic, Fibrotic, Granulating  ?? ??Pressure Ulcer Stage?- III  ?? ??Exudate Amount?- Moderate  ?? ??Exudate Type?- Serosanguineous  ?? ??Exudate Odor?- None  ?? ??Edge?- Well defined  ?? ??Surrounding Tissue Color?- Normal  ?? ??Surrounding Tissue Condition?- Callus, Intact  ?  ?2. Foot Right Lateral Pressure ulcer?- last charted: 11/23/2021 11:16  ?? ??Assessment Done By?- Wound Care Team  ?? ??Abnormality Pattern?- Flat  ?? ??Abnormality Color?- Purple  ?? ??Pressure Point?- Bony prominence  ?? ??Dressing Type?- ABD dressing pad, Gauze dressing, Honey, Medicated packing strips, Rolled Gauze, Tape  ?? ??Dressing Assessment?- Drainage present, Intact  ?? ??Dressing Activity?- Changed  ?? ??Cleansing?- Commercial cleansing solution  ?? ??Length?- 1.6 cm  ?? ??Width?- 0.6 cm  ?? ??Depth?- 0 cm  ?? ??Wound Bed Tissue Type?- Dry, Scab  ?? ??Pressure Ulcer Stage?-  Suspected deep tissue injury  ?? ??Exudate Amount?- None  ?? ??Exudate Odor?- None  ?? ??Edge?- Well defined  ?? ??Surrounding Tissue Color?- Normal  ?? ??Surrounding Tissue Condition?- Intact  ?? ??Status?- Improving  ?  ?3. Ankle Right Anterior Pressure ulcer?- last charted: 11/23/2021 11:00  ?? ??Assessment Done By?- Wound Care Team  ?? ??Abnormality Pattern?- Palpable  ?? ??Abnormality Color?- Black, Red  ?? ??Dressing Type?- Gauze dressing, Honey, Medicated packing strips, Rolled Gauze, Tape  ?? ??Dressing Activity?- Applied  ?? ??Cleansing?- Cleaned with normal saline  ?? ??Length?- 1.0 cm  ?? ??Width?- 1.3 cm  ?? ??Wound Bed Tissue Type?- Necrotic tissue, eschar  ?? ??Pressure Ulcer Stage?- Unstageable  ?? ??Exudate Amount?- None  ?? ??Exudate Odor?- None  ?? ??Edge?- Well defined  ?? ??Surrounding Tissue Color?- Erythema  ?? ??Surrounding Tissue Condition?- Edematous  ?Pt examined and notes review above. Plans is to apply medihoney/mesalt/gauze/abd/kerlix daily ?to Rlat foot/Rheel/R ant ankle. Tubigrip E to right foot. RTC in 1 week  Ordered:  Wound Care Outpatient, *Est. 11/30/21 10:30:00 CST, *Est. Stop date 11/30/21 10:30:00 CST, [facility],  with Physician in 1 week  Wound Care Team Treatment, 11/23/21 11:21:00 CST, Stop date 11/23/21 11:21:00 CST, [facility] for wounds. RTC in 1 week  ?  2.?Lymphedema of leg?I89.0  Ordered:  Wound Care Outpatient, *Est. 11/30/21 10:30:00 CST, *Est. Stop date 11/30/21 10:30:00 CST, [facility],  with Physician in 1 week  Wound Care Team Treatment, 11/23/21 11:21:00 CST, Stop date 11/23/21 11:21:00 CST, [facility] for wounds. RTC in 1 week  ?  3.?Quadriplegia?G82.50  Ordered:  Wound Care Outpatient, *Est. 11/30/21 10:30:00 CST, *Est. Stop date 11/30/21 10:30:00 CST, [facility], FU with Physician in 1 week  Wound Care Team Treatment, 11/23/21 11:21:00 CST, Stop date 11/23/21 11:21:00 CST, [facility] for wounds. RTC in 1 week  ?  4.?Toe  ulcer?L97.509  Ordered:  Wound Care Outpatient, *Est. 11/30/21 10:30:00 CST, *Est. Stop date 11/30/21 10:30:00 CST, [facility], FU with Physician in 1 week  Wound Care Team Treatment, 11/23/21 11:21:00 CST, Stop date 11/23/21 11:21:00 CST, [facility] for wounds. RTC in 1 week  ?  5.?Pressure ulcer with abrasion, blister, partial thickness skin loss involving epidermis and/or dermis?L89.92  Ordered:  Wound Care Outpatient, *Est. 11/30/21 10:30:00 CST, *Est. Stop date 11/30/21 10:30:00 CST, [facility], FU with Physician in 1 week  Wound Care Team Treatment, 11/23/21 11:21:00 CST, Stop date 11/23/21 11:21:00 CST, [facility]. RTC in 1 week  ?   Problem List/Past Medical History  Ongoing  Chronic skin ulcer  DM (diabetes mellitus)  Infected decubitus ulcer  Lymphedema of leg  Neurogenic bladder  Obesity  Open wound of abdomen  Pressure ulcer of heel  Pressure ulcer of right ischium  Quadriplegia  Quadriplegic spinal paralysis  Skin eschar  Historical  Pressure ulcer of right foot stage 3  Medications  Bactroban 2% topical ointment, 1 aruna, TOP, TID,? ?Not Taking, Completed Rx  BASAGLAR INJ 100UNIT  Bydureon BCise 2 mg/0.85 mL subcutaneous suspension, extended release,? ?Not Taking per Prescriber  Ciprofloxacin Hcl 500 Mg Tab, 500 mg= 1 tab(s), BID,? ?Not Taking, Completed Rx  clindamycin 150 mg oral capsule, 300 mg= 2 cap(s), Oral, q6hr,? ?Not Taking, Completed Rx  Eliquis Starter Pack for Treatment of DVT and PE 5 mg oral tablet, 5 mg= 1 tab(s), Oral, BID  gentamicin 0.1% topical ointment, 1 aruna, TOP, Daily,? ?Not Taking, Completed Rx  gentamicin 0.1% topical ointment, 1 aruna, TOP, Daily, 2 refills  GLIPIZIDE TAB 10MG, 10 mg= 1 tab(s), Oral, BID,? ?Not Taking per Prescriber  JANUVIA TAB 100MG, 100 mg= 1 tab(s), Oral, Daily,? ?Not Taking per Prescriber  Januvia 50 mg oral tablet, 50 mg= 1 tab(s), Oral, Daily  Ketoconazole 2% Shampoo,? ?Not Taking, Completed Rx  Levofloxacin 500 Mg Tablet, 500 mg=  1 tab(s), Oral, Daily,? ?Not Taking, Completed Rx  metoprolol succinate 50 mg oral tablet, extended release, 50 mg= 1 tab(s), Oral, BID  MUPIROCIN OIN 2%,? ?Not taking  MYRBETRIQ TAB 50MG, 50 mg= 1 tab(s), Oral, Daily  OXYBUTYNIN TAB 5MG,? ?Not Taking per Prescriber  ReliOn/NovoLIN R 100 units/mL injectable solution  sulfamethoxazole-trimethoprim 800 mg-160 mg oral tablet, 1 tab(s), Oral, BID,? ?Not Taking, Completed Rx  Tramadol Hcl Tab 50 Mg, 50 mg= 1 tab(s), Oral, q4-6hr,? ?Not Taking, Completed Rx  Allergies  No Known Allergies  Social History  Abuse/Neglect  No, 05/19/2020  Tobacco  Never (less than 100 in lifetime), N/A, 05/19/2020  Never smoker, 05/01/2017  Health Maintenance  Health Maintenance  ???Pending?(in the next year)  ??? ??OverDue  ??? ? ? ?Obesity Screening due??01/01/21??and every 1??year(s)  ??? ? ? ?Alcohol Misuse Screening due??01/02/21??and every 1??year(s)  ??? ? ? ?ADL Screening due??05/19/21??and every 1??year(s)  ??? ??Due?  ??? ? ? ?Aspirin Therapy for CVD Prevention due??11/23/21??and every 1??year(s)  ??? ? ? ?Medicare Annual Wellness Exam due??11/23/21??and every 1??year(s)  ??? ? ? ?Tetanus Vaccine due??11/23/21??and every 10??year(s)  ???Satisfied?(in the past 1 year)  ??? ??Satisfied?  ??? ? ? ?Blood Pressure Screening on??11/23/21.??Satisfied by Sally MADDOX, Ca Sánchez  ?

## 2022-05-04 NOTE — HISTORICAL [facility] CERNER
This is a historical note converted from Becca. Formatting and pictures may have been removed.  Please reference Becca for original formatting and attached multimedia. History of Present Illness  Pt with hx of R heel wound  Review of Systems  no complains  Physical Exam  Vitals & Measurements  T:?36.7? ?C (Oral)? HR:?132(Peripheral)? RR:?18? BP:?116/87? SpO2:?99%?  ?  wound is macerated and is bigger, some dry skin removed  Assessment/Plan  1.?Pressure ulcer of right heel, stage 3?L89.613  ?Incision/Wounds  ?1. Heel Right Pressure ulcer?- last charted: 10/12/2021 13:02  ?? ??Assessment Done By?- Wound Care Team  ?? ??Pressure Point?- Bony prominence  ?? ??Dressing Type?- ABD dressing pad, Collagen, Gauze dressing, Rolled Gauze, Silver, Tape, Tubular bandage  ?? ??Dressing Assessment?- Drainage present, Intact  ?? ??Dressing Activity?- Changed  ?? ??Cleansing?- Cleaned with normal saline  ?? ??Length?- 4.8 cm  ?? ??Width?- 8.0 cm  ?? ??Depth?- 0.1 cm  ?? ??Wound Bed Tissue Type?- Epithelialized, Fibrotic, Granulating  ?? ??Pressure Ulcer Stage?- III  ?? ??Exudate Amount?- Moderate  ?? ??Exudate Type?- Serosanguineous  ?? ??Exudate Odor?- None  ?? ??Edge?- Well defined  ?? ??Surrounding Tissue Color?- Normal  ?? ??Surrounding Tissue Condition?- Maceration  ?? ??Status?- Deteriorating  ?  ?2. Toe Right Anterior Trauma?- last charted: 10/12/2021 13:02  ?? ??Assessment Done By?- Wound Care Team  ?? ??Rash Distribution?- Localized  ?? ??Pressure Point?- Bony prominence  ?? ??Dressing Type?- None  ?? ??Cleansing?- Cleaned with normal saline  ?? ??Length?- 0.5 cm  ?? ??Width?- 0.6 cm  ?? ??Wound Bed Tissue Type?- Scab  ?? ??Exudate Amount?- None  ?? ??Exudate Type?- Serous  ?? ??Exudate Odor?- None  ?? ??Edge?- Well defined  ?? ??Surrounding Tissue Color?- Normal  ?? ??Surrounding Tissue Condition?- Callus  ?Pt examined and notes review above. [place] and [place] way [place] his sandhu wasnt clamp appropiately and the  urine for the sandhu wet his wound. Wound was macerated and is bigger but is good tissue in the bed wound. I clean with pickups and scissors all the dry skin and some of the macerated skin around the wound with no problems. Plans is to do mesalt/cover x 1 week QD to his R heel. His r 5th toe only bandaid to protected , apply double layered tubigrip E to his R foot. RTC in 1 week.  Ordered:  Wound Care Outpatient, *Est. 10/19/21 3:00:00 CDT, *Est. Stop date 10/19/21 3:00:00 CDT, [facility], FU with Physician in 1 week  Wound Care Team Treatment, 10/12/21 13:43:00 CDT, Stop date 10/12/21 13:43:00 CDT, Continue mesalt/cover QD x 1 week R heel, Rfifth toe bandaid and R foot double layared tubigrip E. RTC in 1 week  ?  2.?Lymphedema of leg?I89.0  Ordered:  Wound Care Outpatient, *Est. 10/19/21 3:00:00 CDT, *Est. Stop date 10/19/21 3:00:00 CDT, [facility], FU with Physician in 1 week  Wound Care Team Treatment, 10/12/21 13:43:00 CDT, Stop date 10/12/21 13:43:00 CDT, Continue mesalt/cover QD x 1 week R heel, Rfifth toe bandaid and R foot double layared tubigrip E. RTC in 1 week  ?  3.?Quadriplegia?G82.50  Ordered:  Wound Care Outpatient, *Est. 10/19/21 3:00:00 CDT, *Est. Stop date 10/19/21 3:00:00 CDT, [facility], FU with Physician in 1 week  Wound Care Team Treatment, 10/12/21 13:43:00 CDT, Stop date 10/12/21 13:43:00 CDT, Continue mesalt/cover QD x 1 week R heel, Rfifth toe bandaid and R foot double layared tubigrip E. RTC in 1 week  ?  4.?Toe ulcer?L97.509  Ordered:  Wound Care Outpatient, *Est. 10/19/21 3:00:00 CDT, *Est. Stop date 10/19/21 3:00:00 CDT, [facility], FU with Physician in 1 week  Wound Care Team Treatment, 10/12/21 13:43:00 CDT, Stop date 10/12/21 13:43:00 CDT, Continue mesalt/cover QD x 1 week R heel, Rfifth toe bandaid and R foot double layared tubigrip E. RTC in 1 week  ?   Problem List/Past Medical History  Ongoing  Chronic skin ulcer  DM (diabetes  mellitus)  Infected decubitus ulcer  Lymphedema of leg  Neurogenic bladder  Obesity  Open wound of abdomen  Pressure ulcer of heel  Pressure ulcer of right ischium  Quadriplegia  Quadriplegic spinal paralysis  Skin eschar  Historical  Pressure ulcer of right foot stage 3  Medications  Bactroban 2% topical ointment, 1 aruna, TOP, TID,? ?Not Taking, Completed Rx  BASAGLAR INJ 100UNIT  Bydureon BCise 2 mg/0.85 mL subcutaneous suspension, extended release,? ?Not Taking per Prescriber  Ciprofloxacin Hcl 500 Mg Tab, 500 mg= 1 tab(s), BID,? ?Not Taking, Completed Rx  clindamycin 150 mg oral capsule, 300 mg= 2 cap(s), Oral, q6hr,? ?Not Taking, Completed Rx  gentamicin 0.1% topical ointment, 1 aruna, TOP, Daily,? ?Not Taking, Completed Rx  gentamicin 0.1% topical ointment, 1 aruna, TOP, Daily, 2 refills  GLIPIZIDE TAB 10MG, 10 mg= 1 tab(s), Oral, BID,? ?Not Taking per Prescriber  JANUVIA TAB 100MG, 100 mg= 1 tab(s), Oral, Daily,? ?Not Taking per Prescriber  Januvia 50 mg oral tablet, 50 mg= 1 tab(s), Oral, Daily  Ketoconazole 2% Shampoo,? ?Not Taking, Completed Rx  Levofloxacin 500 Mg Tablet, 500 mg= 1 tab(s), Oral, Daily,? ?Not Taking, Completed Rx  MUPIROCIN OIN 2%,? ?Not taking  MYRBETRIQ TAB 50MG, 50 mg= 1 tab(s), Oral, Daily  OXYBUTYNIN TAB 5MG,? ?Not Taking per Prescriber  ReliOn/NovoLIN R 100 units/mL injectable solution  sulfamethoxazole-trimethoprim 800 mg-160 mg oral tablet, 1 tab(s), Oral, BID,? ?Not Taking, Completed Rx  Tramadol Hcl Tab 50 Mg, 50 mg= 1 tab(s), Oral, q4-6hr,? ?Not Taking, Completed Rx  Allergies  No Known Allergies  Social History  Abuse/Neglect  No, 05/19/2020  Tobacco  Never (less than 100 in lifetime), N/A, 05/19/2020  Never smoker, 05/01/2017  Health Maintenance  Health Maintenance  ???Pending?(in the next year)  ??? ??OverDue  ??? ? ? ?Obesity Screening due??01/01/21??and every 1??year(s)  ??? ? ? ?Alcohol Misuse Screening due??01/02/21??and every 1??year(s)  ??? ? ? ?ADL Screening  due??05/19/21??and every 1??year(s)  ??? ??Due?  ??? ? ? ?Aspirin Therapy for CVD Prevention due??10/12/21??and every 1??year(s)  ??? ? ? ?Medicare Annual Wellness Exam due??10/12/21??and every 1??year(s)  ??? ? ? ?Tetanus Vaccine due??10/12/21??and every 10??year(s)  ???Satisfied?(in the past 1 year)  ??? ??Satisfied?  ??? ? ? ?Blood Pressure Screening on??10/12/21.??Satisfied by Sally MADDOX, Ca Sánchez  ?

## 2022-05-04 NOTE — HISTORICAL [facility] CERN[facility]
This is a historical note converted from Becca. Formatting and pictures may have been removed.  Please reference Becca for original formatting and attached multimedia. Physical Exam  Vitals & Measurements  T:?36.8? ?C (Oral)? HR:?84(Peripheral)? RR:?18? BP:?133/88? SpO2:?96%?  ?  Assessment/Plan  1.?Open wound of abdomen?S31.109A  2.?Pressure ulcer, heel?L89.609  3.?Decubitus ulcer of heel, stage 3?L89.603  ?Incision/Wounds  ?1. Abdomen Lower, Other: surgical incision?- last charted: 07/10/2019 13:00  ?? ??Assessment Done By?- Wound Care Team  ?? ??Dressing Type?- Medicated packing strips  ?? ??Dressing Assessment?- Drainage present, Intact  ?? ??Dressing Activity?- Changed  ?? ??Cleansing?- Cleaned with normal saline, Irrigated with normal saline  ?? ??Length?- 0.8 cm  ?? ??Width?- 0.5 cm  ?? ??Depth?- 4.5 cm  ?? ??Wound Bed Tissue Type?- Granulating  ?? ??Percent Granulated?- 95 %  ?? ??Percent Epithelialized?- 5 %  ?? ??Exudate Amount?- Heavy  ?? ??Exudate Type?- Serosanguineous  ?? ??Exudate Odor?- None  ?? ??Edge?- Well defined  ?? ??Surrounding Tissue Color?- Erythema  ?? ??Surrounding Tissue Condition?- Edematous, Friable, Intact  ?  ?2. Foot Right Plantar Blister?- last charted: 07/10/2019 13:00  ?? ??Assessment Done By?- Wound Care Team  ?? ??Pressure Point?- Bony prominence  ?? ??Dressing Type?- None  ?? ??Dressing Activity?- Applied  ?? ??Cleansing?- Cleaned with normal saline  ?? ??Length?- 5.0 cm  ?? ??Width?- 4.2 cm  ?? ??Depth?- 0.1 cm  ?? ??Wound Bed Tissue Type?- Granulating, Pale Pink  ?? ??Percent Granulated?- 100 %  ?? ??Pressure Ulcer Stage?- III  ?? ??Exudate Amount?- Small  ?? ??Exudate Type?- Serous  ?? ??Exudate Odor?- None  ?? ??Edge?- Attached to wound bed  ?? ??Surrounding Tissue Color?- Erythema  ?? ??Surrounding Tissue Condition?- Edematous, Intact  ?? ??Status?- Deteriorating  ?pt has a stage 3 reopen heel ulcer and will require collagen matrix dressing changes every othere  day  Orders:  Wound Care Outpatient, *Est. 07/17/19 11:00:00 CDT, *Est. Stop date 07/17/19 11:00:00 CDT, [facility], FU with Physician in 1 week  Wound Care Team Treatment, 07/10/19 13:44:00 CDT, Stop date 07/10/19 13:44:00 CDT, Irrigate abdominal wound with wound cleanser, mesalt packing to wound daily, cover with gauze, tape. R heel- Cleanse with wound cleanser, apply rocky to wound, optifoam gentle . change every ot...   Problem List/Past Medical History  Ongoing  Chronic skin ulcer  DM (diabetes mellitus)  Open wound of abdomen  Quadriplegia  Quadriplegic spinal paralysis  Historical  No qualifying data  Medications  Bactroban 2% topical ointment, 1 aruna, TOP, TID,? ?Not Taking, Completed Rx  BASAGLAR INJ 100UNIT  Bydureon BCise 2 mg/0.85 mL subcutaneous suspension, extended release  Ciprofloxacin Hcl 500 Mg Tab, 500 mg= 1 tab(s), BID,? ?Not Taking, Completed Rx  clindamycin 150 mg oral capsule, 300 mg= 2 cap(s), Oral, q6hr,? ?Not Taking, Completed Rx  GLIPIZIDE TAB 10MG, 10 mg= 1 tab(s), Oral, BID,? ?Not Taking per Prescriber  JANUVIA TAB 100MG, 100 mg= 1 tab(s), Oral, Daily,? ?Not Taking per Prescriber  Ketoconazole 2% Shampoo,? ?Not Taking, Completed Rx  Levofloxacin 500 Mg Tablet, 500 mg= 1 tab(s), Oral, Daily,? ?Not Taking, Completed Rx  MUPIROCIN OIN 2%,? ?Not taking  MYRBETRIQ TAB 50MG, 50 mg= 1 tab(s), Oral, Daily  OXYBUTYNIN TAB 5MG,? ?Not Taking per Prescriber  ReliOn/NovoLIN R 100 units/mL injectable solution  Tramadol Hcl Tab 50 Mg, 50 mg= 1 tab(s), Oral, q4-6hr  Allergies  No Known Allergies  Social History  Tobacco  Never smoker, 05/01/2017  Health Maintenance  Health Maintenance  ???Pending?(in the next year)  ??? ??OverDue  ??? ? ? ?Alcohol Misuse Screening due??01/01/19??and every 1??year(s)  ??? ? ? ?Obesity Screening due??01/01/19??and every 1??year(s)  ??? ? ? ?ADL Screening due??05/30/19??and every 1??year(s)  ??? ??Due?  ??? ? ? ?Aspirin Therapy for CVD Prevention  due??07/11/19??and every 1??year(s)  ??? ? ? ?Tetanus Vaccine due??07/11/19??and every 10??year(s)  ???Satisfied?(in the past 1 year)  ??? ??Satisfied?  ??? ? ? ?Blood Pressure Screening on??07/10/19.??Satisfied by Junior MADDOX, Nguyen Nunn  ?

## 2022-05-04 NOTE — HISTORICAL [facility] CERN[facility]
This is a historical note converted from Becca. Formatting and pictures may have been removed.  Please reference Becca for original formatting and attached multimedia. Physical Exam  Vitals & Measurements  T:?36.7? ?C (Oral)? HR:?86(Peripheral)? RR:?18? BP:?150/95? SpO2:?97%?  ?  Assessment/Plan  1.?Chronic skin ulcer?L98.499  ?Incision/Wounds  ?1. Abdomen Lower, Other: surgical incision?- last charted: 09/11/2019 11:35  ?? ??Assessment Done By?- Wound Care Team  ?? ??Dressing Type?- Gauze dressing, Tape  ?? ??Dressing Assessment?- Dry, Intact  ?? ??Dressing Activity?- Changed  ?? ??Cleansing?- Cleaned with normal saline, Irrigated with normal saline  ?? ??Length?- 0 cm  ?? ??Width?- 0 cm  ?? ??Depth?- 0 cm  ?? ??Wound Bed Tissue Type?- Epithelialized, Scab  ?? ??Percent Epithelialized?- 100 %  ?? ??Edge?- Well defined  ?? ??Surrounding Tissue Color?- Normal  ?? ??Surrounding Tissue Condition?- Intact  ?? ??Status?- Healed  ?  ?2. Foot Right Plantar Blister?- last charted: 09/11/2019 11:35  ?? ??Assessment Done By?- Wound Care Team  ?? ??Pressure Point?- Bony prominence  ?? ??Dressing Type?- Foam  ?? ??Dressing Assessment?- Dry, Intact  ?? ??Dressing Activity?- Changed  ?? ??Cleansing?- Cleaned with normal saline  ?? ??Length?- 0 cm  ?? ??Width?- 0 cm  ?? ??Depth?- 0 cm  ?? ??Wound Bed Tissue Type?- Granulating, Pale Pink  ?? ??Percent Granulated?- 100 %  ?? ??Pressure Ulcer Stage?- III  ?? ??Surrounding Tissue Color?- Normal  ?? ??Surrounding Tissue Condition?- Friable, Intact  ?? ??Status?- Healed  ?all wounds healed will dc pt for prn fu as needed  Orders:  Wound Care Team Treatment, 09/11/19 11:39:00 CDT, Stop date 09/11/19 11:39:00 CDT, dc pt   Problem List/Past Medical History  Ongoing  Chronic skin ulcer  DM (diabetes mellitus)  Open wound of abdomen  Quadriplegia  Quadriplegic spinal paralysis  Historical  No qualifying data  Medications  Bactroban 2% topical ointment, 1 aruna, TOP, TID,? ?Not Taking,  Completed Rx  BASAGLAR INJ 100UNIT  Bydureon BCise 2 mg/0.85 mL subcutaneous suspension, extended release  Ciprofloxacin Hcl 500 Mg Tab, 500 mg= 1 tab(s), BID,? ?Not Taking, Completed Rx  clindamycin 150 mg oral capsule, 300 mg= 2 cap(s), Oral, q6hr,? ?Not Taking, Completed Rx  GLIPIZIDE TAB 10MG, 10 mg= 1 tab(s), Oral, BID,? ?Not Taking per Prescriber  JANUVIA TAB 100MG, 100 mg= 1 tab(s), Oral, Daily,? ?Not Taking per Prescriber  Ketoconazole 2% Shampoo,? ?Not Taking, Completed Rx  Levofloxacin 500 Mg Tablet, 500 mg= 1 tab(s), Oral, Daily,? ?Not Taking, Completed Rx  MUPIROCIN OIN 2%,? ?Not taking  MYRBETRIQ TAB 50MG, 50 mg= 1 tab(s), Oral, Daily  OXYBUTYNIN TAB 5MG,? ?Not Taking per Prescriber  ReliOn/NovoLIN R 100 units/mL injectable solution  Tramadol Hcl Tab 50 Mg, 50 mg= 1 tab(s), Oral, q4-6hr  Allergies  No Known Allergies  Social History  Tobacco  Never smoker, 05/01/2017  Health Maintenance  Health Maintenance  ???Pending?(in the next year)  ??? ??OverDue  ??? ? ? ?Alcohol Misuse Screening due??01/01/19??and every 1??year(s)  ??? ? ? ?Obesity Screening due??01/01/19??and every 1??year(s)  ??? ? ? ?ADL Screening due??05/30/19??and every 1??year(s)  ??? ??Due?  ??? ? ? ?Aspirin Therapy for CVD Prevention due??09/11/19??and every 1??year(s)  ??? ? ? ?Tetanus Vaccine due??09/11/19??and every 10??year(s)  ???Satisfied?(in the past 1 year)  ??? ??Satisfied?  ??? ? ? ?Blood Pressure Screening on??09/11/19.??Satisfied by Jolynn Madrid LPN  ?

## 2022-05-04 NOTE — HISTORICAL [facility] CERNER
This is a historical note converted from Becca. Formatting and pictures may have been removed.  Please reference Becca for original formatting and attached multimedia. Chief Complaint  pressure ulcer right heel  History of Present Illness  See nurses notes  Review of Systems  See nurses notes  Physical Exam  Vitals & Measurements  T:?36.9? ?C (Oral)? HR:?93(Peripheral)? RR:?18? BP:?115/78? SpO2:?98%?  ?  Assessment/Plan  Pressure ulcer, heel?L89.609  ?Incision/Wounds  ?1. Abdomen Lower, Other: surgical incision?- last charted: 08/28/2019 11:48  ?? ??Assessment Done By?- Wound Care Team  ?? ??Dressing Type?- Gauze dressing, Tape  ?? ??Dressing Assessment?- Dry, Intact  ?? ??Dressing Activity?- Changed  ?? ??Cleansing?- Cleaned with normal saline, Irrigated with normal saline  ?? ??Length?- 0 cm  ?? ??Width?- 0 cm  ?? ??Depth?- 0 cm  ?? ??Wound Bed Tissue Type?- Epithelialized, Scab  ?? ??Percent Epithelialized?- 100 %  ?? ??Edge?- Well defined  ?? ??Surrounding Tissue Color?- Normal  ?? ??Surrounding Tissue Condition?- Intact  ?? ??Status?- Healed  ?  ?2. Foot Right Plantar Blister?- last charted: 08/28/2019 11:48  ?? ??Assessment Done By?- Wound Care Team  ?? ??Pressure Point?- Bony prominence  ?? ??Dressing Type?- Collagen, Foam  ?? ??Dressing Assessment?- Drainage present, Intact  ?? ??Dressing Activity?- Changed  ?? ??Cleansing?- Cleaned with normal saline  ?? ??Length?- 0.5 cm  ?? ??Width?- 0.3 cm  ?? ??Depth?- 0.1 cm  ?? ??Wound Bed Tissue Type?- Granulating, Pale Pink  ?? ??Percent Granulated?- 100 %  ?? ??Pressure Ulcer Stage?- III  ?? ??Exudate Amount?- Scant  ?? ??Exudate Type?- Serous  ?? ??Exudate Odor?- None  ?? ??Edge?- Attached to wound bed  ?? ??Surrounding Tissue Color?- Normal  ?? ??Surrounding Tissue Condition?- Friable, Intact  ?? ??Status?- Improving  ?Patient has seen the urologist, who did not recommend surgery, but started him on?oxybutynin and imipramine. ?This has improved his symptoms.  ?His right heel?pressure ulcer continues to heal?with collagen, so well continue plan of care. 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 Very occasionally he may have a little leakage.? Return in a week.  Orders:  Wound Care Outpatient, *Est. 09/04/19 3:00:00 CDT, *Est. Stop date 09/04/19 3:00:00 CDT, [facility], FU with Physician in 1 week  Wound Care Outpatient, 08/28/19 11:00:00 CDT, Stop date 08/28/19 11:00:00 CDT, [facility], FU with Physician in 1 week  Wound Care Team Treatment, 08/28/19 12:03:00 CDT, Stop date 08/28/19 12:03:00 CDT, Lor q 2 dqys   Problem List/Past Medical History  Ongoing  Chronic skin ulcer  DM (diabetes mellitus)  Open wound of abdomen  Quadriplegia  Quadriplegic spinal paralysis  Historical  No qualifying data  Medications  Bactroban 2% topical ointment, 1 aruna, TOP, TID,? ?Not Taking, Completed Rx  BASAGLAR INJ 100UNIT  Bydureon BCise 2 mg/0.85 mL subcutaneous suspension, extended release  Ciprofloxacin Hcl 500 Mg Tab, 500 mg= 1 tab(s), BID,? ?Not Taking, Completed Rx  clindamycin 150 mg oral capsule, 300 mg= 2 cap(s), Oral, q6hr,? ?Not Taking, Completed Rx  GLIPIZIDE TAB 10MG, 10 mg= 1 tab(s), Oral, BID,? ?Not Taking per Prescriber  JANUVIA TAB 100MG, 100 mg= 1 tab(s), Oral, Daily,? ?Not Taking per Prescriber  Ketoconazole 2% Shampoo,? ?Not Taking, Completed Rx  Levofloxacin 500 Mg Tablet, 500 mg= 1 tab(s), Oral, Daily,? ?Not Taking, Completed Rx  MUPIROCIN OIN 2%,? ?Not taking  MYRBETRIQ TAB 50MG, 50 mg= 1 tab(s), Oral, Daily  OXYBUTYNIN TAB 5MG,? ?Not Taking per Prescriber  ReliOn/NovoLIN R 100 units/mL injectable solution  Tramadol Hcl Tab 50 Mg, 50 mg= 1 tab(s), Oral, q4-6hr  Allergies  No Known Allergies  Social History  Tobacco  Never smoker, 05/01/2017  Health Maintenance  Health Maintenance  ???Pending?(in the next year)  ??? ??OverDue  ??? ? ? ?Alcohol Misuse Screening due??01/01/19??and every 1??year(s)  ??? ? ? ?Obesity Screening  due??01/01/19??and every 1??year(s)  ??? ? ? ?ADL Screening due??05/30/19??and every 1??year(s)  ??? ??Due?  ??? ? ? ?Aspirin Therapy for CVD Prevention due??08/28/19??and every 1??year(s)  ??? ? ? ?Tetanus Vaccine due??08/28/19??and every 10??year(s)  ???Satisfied?(in the past 1 year)  ??? ??Satisfied?  ??? ? ? ?Blood Pressure Screening on??08/28/19.??Satisfied by Jolynn Madrid LPN  ?

## 2022-05-04 NOTE — HISTORICAL [facility]ERNER
This is a historical note converted from Becca. Formatting and pictures may have been removed.  Please reference Becca for original formatting and attached multimedia. History of Present Illness  The patient?presents today for?evaluation of a blister?that developed approximately 1 week ago on his?right heel.? He has noticed slight drainage.? It is noteworthy that he is?quadriplegic?and has had recurrent?ulcers at multiple sites including his right heel.  Review of Systems  Not significantly different than history of present and past medical illnesses.  Physical Exam  Vitals & Measurements  T:?36.9? ?C (Oral)? HR:?126(Peripheral)? RR:?18? BP:?112/78? SpO2:?99%?  ?  The patient has a ruptured blister?over the posterior right heel?moderate?subcutaneous hemorrhagic changes?and?fibrin debris.? There is also?a slightly greenish?hue?at the wound site.  ?  Incision/Wounds  ?1. Heel Right Pressure ulcer?- last charted: 06/08/2021 11:14  ?? ??Assessment Done By?- Wound Care Team  ?? ??Pressure Point?- Bony prominence  ?? ??Dressing Type?- Foam, Medicated packing strips, Tape  ?? ??Dressing Assessment?- Clean, Dry, Intact  ?? ??Dressing Activity?- Changed  ?? ??Cleansing?- Cleaned with normal saline  ?? ??Length?- 0.4 cm  ?? ??Width?- 0.3 cm  ?? ??Depth?- 0.1 cm  ?? ??Wound Bed Tissue Type?- Granulating, Pale Pink, Scab  ?? ??Pressure Ulcer Stage?- III  ?? ??Exudate Amount?- Scant  ?? ??Exudate Type?- Serous  ?? ??Exudate Odor?- None  ?? ??Edge?- Well defined  ?? ??Surrounding Tissue Color?- Normal  ?? ??Surrounding Tissue Condition?- Maceration  ?? ??Topical Agent Application?- Ointment  ?  Assessment/Plan  1.?Pressure ulcer of right ischium?L89.319  ?Improved.  ?  2.?Pressure ulcer of heel?L89.609  ?Recent exacerbation?currently with?full-thickness?presentation following recent development of?an infected blister.? The patient was advised that surgical debridement was indicated. ?He consented?and thereafter he underwent  excisional debridement of his?right heel wound using?surgical scissors, pickups and?surgical curette.? Debridement was carried down to subcutaneous tissue. ?A large amount of?old blood and fibrinous debris was?removed. ?There was a small amount of bleeding which was easily controlled with pressure.? The postoperative wound measurements were.? Local wound care will consist of modalities as recommended and documented below.? T?4.3 cm x 7.4 cm x 0.1 cm.??The?debrided tissue will be submitted to the lab for culture and sensitivity. ?Empirically he will be placed on?ciprofloxacin for suspected?Pseudomonas?involvement.  Ordered:  Tissue Culture - Aerobic, Routine collect, 06/08/21 12:01:00 CDT, Order for future visit, Tissue, Heel, Nurse collect, Stop date 06/08/21 12:01:00 CDT, Pressure ulcer of heel  Quadriplegia  Wound Care Outpatient, *Est. 06/15/21 3:00:00 CDT, *Est. Stop date 06/15/21 3:00:00 CDT, [facility], FU with Physician in 1 week  Wound Care Team Treatment, 06/08/21 12:01:00 CDT, Stop date 06/08/21 12:01:00 CDT, Use Mesalt and honey to the right heel wound and change daily. Apply foam overlay. Use best offloading efforts!  ?  3.?Quadriplegia?G82.50  Ordered:  Tissue Culture - Aerobic, Routine collect, 06/08/21 12:01:00 CDT, Order for future visit, Tissue, Heel, Nurse collect, Stop date 06/08/21 12:01:00 CDT, Pressure ulcer of heel  Quadriplegia  Wound Care Outpatient, *Est. 06/15/21 3:00:00 CDT, *Est. Stop date 06/15/21 3:00:00 CDT, [facility], FU with Physician in 1 week  Wound Care Team Treatment, 06/08/21 12:01:00 CDT, Stop date 06/08/21 12:01:00 CDT, Use Mesalt and honey to the right heel wound and change daily. Apply foam overlay. Use best offloading efforts!  ?  Orders:  ciprofloxacin, 500 mg = 1 tab(s), Oral, q12hr, For infection, X 5 day(s), # 10 tab(s), 0 Refill(s), Pharmacy: [facility], 172.7, cm, Height/Length Dosing, 05/19/20 14:26:00 CDT, 102, kg, Weight Dosing,  05/19/20 14:26:00 CDT   Problem List/Past Medical History  Ongoing  Chronic skin ulcer  DM (diabetes mellitus)  Neurogenic bladder  Obesity  Open wound of abdomen  Pressure ulcer of heel  Pressure ulcer of right ischium  Quadriplegia  Quadriplegic spinal paralysis  Skin eschar  Historical  Pressure ulcer of right foot stage 3  Medications  Bactroban 2% topical ointment, 1 aruna, TOP, TID,? ?Not Taking, Completed Rx  BASAGLAR INJ 100UNIT  Bydureon BCise 2 mg/0.85 mL subcutaneous suspension, extended release,? ?Not Taking per Prescriber  Cipro 500 mg oral tablet, 500 mg= 1 tab(s), Oral, q12hr  Ciprofloxacin Hcl 500 Mg Tab, 500 mg= 1 tab(s), BID,? ?Not Taking, Completed Rx  clindamycin 150 mg oral capsule, 300 mg= 2 cap(s), Oral, q6hr,? ?Not Taking, Completed Rx  gentamicin 0.1% topical ointment, 1 aruna, TOP, Daily,? ?Not Taking, Completed Rx  gentamicin 0.1% topical ointment, 1 aruna, TOP, Daily, 2 refills  GLIPIZIDE TAB 10MG, 10 mg= 1 tab(s), Oral, BID,? ?Not Taking per Prescriber  JANUVIA TAB 100MG, 100 mg= 1 tab(s), Oral, Daily,? ?Not Taking per Prescriber  Januvia 50 mg oral tablet, 50 mg= 1 tab(s), Oral, Daily  Ketoconazole 2% Shampoo,? ?Not Taking, Completed Rx  Levofloxacin 500 Mg Tablet, 500 mg= 1 tab(s), Oral, Daily,? ?Not Taking, Completed Rx  MUPIROCIN OIN 2%,? ?Not taking  MYRBETRIQ TAB 50MG, 50 mg= 1 tab(s), Oral, Daily  OXYBUTYNIN TAB 5MG,? ?Not Taking per Prescriber  ReliOn/NovoLIN R 100 units/mL injectable solution  sulfamethoxazole-trimethoprim 800 mg-160 mg oral tablet, 1 tab(s), Oral, BID,? ?Not Taking, Completed Rx  Tramadol Hcl Tab 50 Mg, 50 mg= 1 tab(s), Oral, q4-6hr,? ?Not Taking, Completed Rx  Allergies  No Known Allergies  Social History  Abuse/Neglect  No, 05/19/2020  Tobacco  Never (less than 100 in lifetime), N/A, 05/19/2020  Never smoker, 05/01/2017  Health Maintenance  Health Maintenance  ???Pending?(in the next year)  ??? ??OverDue  ??? ? ? ?Obesity Screening due??01/01/21??and every  1??year(s)  ??? ? ? ?Alcohol Misuse Screening due??01/02/21??and every 1??year(s)  ??? ? ? ?ADL Screening due??05/19/21??and every 1??year(s)  ??? ??Due?  ??? ? ? ?Aspirin Therapy for CVD Prevention due??06/08/21??and every 1??year(s)  ??? ? ? ?Medicare Annual Wellness Exam due??06/08/21??and every 1??year(s)  ??? ? ? ?Tetanus Vaccine due??06/08/21??and every 10??year(s)  ???Satisfied?(in the past 1 year)  ??? ??Satisfied?  ??? ? ? ?Blood Pressure Screening on??06/08/21.??Satisfied by VERONICA Tim Shawndala  ?

## 2022-05-06 ENCOUNTER — OFFICE VISIT (OUTPATIENT)
Dept: WOUND CARE | Facility: HOSPITAL | Age: 57
End: 2022-05-06
Attending: INTERNAL MEDICINE
Payer: MEDICARE

## 2022-05-06 VITALS
TEMPERATURE: 100 F | SYSTOLIC BLOOD PRESSURE: 95 MMHG | RESPIRATION RATE: 18 BRPM | OXYGEN SATURATION: 100 % | DIASTOLIC BLOOD PRESSURE: 64 MMHG | HEART RATE: 65 BPM

## 2022-05-06 DIAGNOSIS — L02.419 ABSCESS OF CALF: ICD-10-CM

## 2022-05-06 DIAGNOSIS — L89.613 PRESSURE INJURY OF RIGHT HEEL, STAGE 3: Primary | ICD-10-CM

## 2022-05-06 DIAGNOSIS — L89.023: ICD-10-CM

## 2022-05-06 PROCEDURE — 99213 OFFICE O/P EST LOW 20 MIN: CPT

## 2022-05-06 NOTE — PROGRESS NOTES
Subjective:       Patient ID: Antonio Galvan II is a 56 y.o. male.    Chief Complaint: No chief complaint on file.    [facility]  Review of Systems      Objective:      [unfilled]  Physical Exam       Altered Skin Integrity 05/06/22 1140 Right Heel  Full thickness tissue loss. Subcutaneous fat may be visible but bone, tendon or muscle are not exposed (Active)   05/06/22 1140   Altered Skin Integrity Present on Admission:    Side: Right   Orientation:    Location: Heel   Wound Number:    Is this injury device related?: No   Primary Wound Type:    Description of Altered Skin Integrity: Full thickness tissue loss. Subcutaneous fat may be visible but bone, tendon or muscle are not exposed   Removal Indication and Assessment:    Wound Outcome:    (Retired) Wound Length (cm):    (Retired) Wound Width (cm):    (Retired) Depth (cm):    Wound Description (Comments):    Removal Indications:    Dressing Appearance Intact;Moist drainage 05/06/22 1100   Drainage Amount Moderate 05/06/22 1100   Drainage Characteristics/Odor Serosanguineous 05/06/22 1100   Appearance Ecchymotic;Granulating;Fibrin;Hypergranulation 05/06/22 1100   Tissue loss description Full thickness 05/06/22 1100   Periwound Area Scar tissue 05/06/22 1100   Wound Edges Defined 05/06/22 1100   Wound Length (cm) 4.4 cm 05/06/22 1100   Wound Width (cm) 2.6 cm 05/06/22 1100   Wound Depth (cm) 0.1 cm 05/06/22 1100   Wound Volume (cm^3) 1.144 cm^3 05/06/22 1100   Wound Surface Area (cm^2) 11.44 cm^2 05/06/22 1100   Care Cleansed with:;Sterile normal saline 05/06/22 1100   Dressing Removed;Honey;Sodium chloride impregnated 05/06/22 1100            Altered Skin Integrity 05/06/22 1147 Right posterior Calf Abscess Full thickness tissue loss. Subcutaneous fat may be visible but bone, tendon or muscle are not exposed (Active)   05/06/22 1147   Altered Skin Integrity Present on Admission:    Side: Right   Orientation: posterior   Location: Calf   Wound Number:    Is this  injury device related?: No   Primary Wound Type: Abscess   Description of Altered Skin Integrity: Full thickness tissue loss. Subcutaneous fat may be visible but bone, tendon or muscle are not exposed   Removal Indication and Assessment:    Wound Outcome:    (Retired) Wound Length (cm):    (Retired) Wound Width (cm):    (Retired) Depth (cm):    Wound Description (Comments):    Removal Indications:    Description of Altered Skin Integrity Full thickness tissue loss. Subcutaneous fat may be visible but bone, tendon or muscle are not exposed 05/06/22 1100   Dressing Appearance Intact;Moist drainage 05/06/22 1100   Drainage Amount Small 05/06/22 1100   Drainage Characteristics/Odor Serosanguineous 05/06/22 1100   Appearance Pink;White;Tan 05/06/22 1100   Tissue loss description Full thickness 05/06/22 1100   Periwound Area Intact 05/06/22 1100   Wound Edges Defined 05/06/22 1100   Wound Length (cm) 2.2 cm 05/06/22 1100   Wound Width (cm) 2.6 cm 05/06/22 1100   Wound Depth (cm) 0.7 cm 05/06/22 1100   Wound Volume (cm^3) 4.004 cm^3 05/06/22 1100   Wound Surface Area (cm^2) 5.72 cm^2 05/06/22 1100   Care Cleansed with:;Sterile normal saline 05/06/22 1100   Dressing Removed;Honey;Sodium chloride impregnated;Gauze 05/06/22 1100            Altered Skin Integrity Left posterior Arm Other (comment) (Active)       Altered Skin Integrity Present on Admission:    Side: Left   Orientation: posterior   Location: Arm   Wound Number:    Is this injury device related?:    Primary Wound Type: Other   Description of Altered Skin Integrity:    Removal Indication and Assessment:    Wound Outcome:    (Retired) Wound Length (cm):    (Retired) Wound Width (cm):    (Retired) Depth (cm):    Wound Description (Comments):    Removal Indications:    Description of Altered Skin Integrity Full thickness tissue loss. Subcutaneous fat may be visible but bone, tendon or muscle are not exposed 05/06/22 1100   Dressing Appearance Intact;Moist drainage  05/06/22 1100   Drainage Amount Small 05/06/22 1100   Drainage Characteristics/Odor Serous 05/06/22 1100   Appearance Red;White;Granulating;Fibrin 05/06/22 1100   Tissue loss description Full thickness 05/06/22 1100   Red (%), Wound Tissue Color 95 % 05/06/22 1100   Periwound Area Intact;Scar tissue 05/06/22 1100   Wound Edges Defined 05/06/22 1100   Wound Length (cm) 1.5 cm 05/06/22 1100   Wound Width (cm) 1.2 cm 05/06/22 1100   Wound Depth (cm) 0.1 cm 05/06/22 1100   Wound Volume (cm^3) 0.18 cm^3 05/06/22 1100   Wound Surface Area (cm^2) 1.8 cm^2 05/06/22 1100   Care Cleansed with:;Sterile normal saline 05/06/22 1100   Dressing Removed;Honey;Sodium chloride impregnated;Gauze 05/06/22 1100         Assessment:     pt wounds improving continue current plan of care    ICD-10-CM ICD-9-CM   1. Pressure injury of left elbow, stage 3  L89.023 707.01     707.23   2. Pressure injury of right heel, stage 3  L89.613 707.07     707.23   3. Abscess of calf  L02.419 682.6         Plan:   Tissue pathology and/or culture taken:  [] Yes [x] No   Sharp debridement performed:   [] Yes [x] No   Labs ordered this visit:   [] Yes [x] No   Imaging ordered this visit:   [] Yes [x] No           No orders of the defined types were placed in this encounter.       Follow up in about 2 weeks (around 5/20/2022).   All wounds:  Cleanse wound with: NS, soap and water, or wound cleanser  Periwound care: Skin prep PRN  Primary dressing: medihoney/mesalt  Secondary dressing:gauze / abd prn/ cover dressing  Offloading:  Edema control: Tubigrip E   Frequency: daily  Follow-up: 2 weeks

## 2022-05-06 NOTE — PROGRESS NOTES
Subjective:       Patient ID: Antonio Galvan II is a 56 y.o. male.    Chief Complaint: No chief complaint on file.    [facility]  Review of Systems      Objective:      [unfilled]  Physical Exam       Altered Skin Integrity 05/06/22 1140 Right Heel  Full thickness tissue loss. Subcutaneous fat may be visible but bone, tendon or muscle are not exposed (Active)   05/06/22 1140   Altered Skin Integrity Present on Admission:    Side: Right   Orientation:    Location: Heel   Wound Number:    Is this injury device related?: No   Primary Wound Type:    Description of Altered Skin Integrity: Full thickness tissue loss. Subcutaneous fat may be visible but bone, tendon or muscle are not exposed   Removal Indication and Assessment:    Wound Outcome:    (Retired) Wound Length (cm):    (Retired) Wound Width (cm):    (Retired) Depth (cm):    Wound Description (Comments):    Removal Indications:    Dressing Appearance Intact;Moist drainage 05/06/22 1100   Drainage Amount Moderate 05/06/22 1100   Drainage Characteristics/Odor Serosanguineous 05/06/22 1100   Appearance Ecchymotic;Granulating;Fibrin;Hypergranulation 05/06/22 1100   Tissue loss description Full thickness 05/06/22 1100   Periwound Area Scar tissue 05/06/22 1100   Wound Edges Defined 05/06/22 1100   Wound Length (cm) 4.4 cm 05/06/22 1100   Wound Width (cm) 2.6 cm 05/06/22 1100   Wound Depth (cm) 0.1 cm 05/06/22 1100   Wound Volume (cm^3) 1.144 cm^3 05/06/22 1100   Wound Surface Area (cm^2) 11.44 cm^2 05/06/22 1100   Care Cleansed with:;Sterile normal saline 05/06/22 1100   Dressing Removed;Honey;Sodium chloride impregnated 05/06/22 1100            Altered Skin Integrity 05/06/22 1147 Right posterior Calf Abscess Full thickness tissue loss. Subcutaneous fat may be visible but bone, tendon or muscle are not exposed (Active)   05/06/22 1147   Altered Skin Integrity Present on Admission:    Side: Right   Orientation: posterior   Location: Calf   Wound Number:    Is this  injury device related?: No   Primary Wound Type: Abscess   Description of Altered Skin Integrity: Full thickness tissue loss. Subcutaneous fat may be visible but bone, tendon or muscle are not exposed   Removal Indication and Assessment:    Wound Outcome:    (Retired) Wound Length (cm):    (Retired) Wound Width (cm):    (Retired) Depth (cm):    Wound Description (Comments):    Removal Indications:    Description of Altered Skin Integrity Full thickness tissue loss. Subcutaneous fat may be visible but bone, tendon or muscle are not exposed 05/06/22 1100   Dressing Appearance Intact;Moist drainage 05/06/22 1100   Drainage Amount Small 05/06/22 1100   Drainage Characteristics/Odor Serosanguineous 05/06/22 1100   Appearance Pink;White;Tan 05/06/22 1100   Tissue loss description Full thickness 05/06/22 1100   Periwound Area Intact 05/06/22 1100   Wound Edges Defined 05/06/22 1100   Wound Length (cm) 2.2 cm 05/06/22 1100   Wound Width (cm) 2.6 cm 05/06/22 1100   Wound Depth (cm) 0.7 cm 05/06/22 1100   Wound Volume (cm^3) 4.004 cm^3 05/06/22 1100   Wound Surface Area (cm^2) 5.72 cm^2 05/06/22 1100   Care Cleansed with:;Sterile normal saline 05/06/22 1100   Dressing Removed;Honey;Sodium chloride impregnated;Gauze 05/06/22 1100            Altered Skin Integrity Left posterior Arm Other (comment) (Active)       Altered Skin Integrity Present on Admission:    Side: Left   Orientation: posterior   Location: Arm   Wound Number:    Is this injury device related?:    Primary Wound Type: Other   Description of Altered Skin Integrity:    Removal Indication and Assessment:    Wound Outcome:    (Retired) Wound Length (cm):    (Retired) Wound Width (cm):    (Retired) Depth (cm):    Wound Description (Comments):    Removal Indications:    Description of Altered Skin Integrity Full thickness tissue loss. Subcutaneous fat may be visible but bone, tendon or muscle are not exposed 05/06/22 1100   Dressing Appearance Intact;Moist drainage  05/06/22 1100   Drainage Amount Small 05/06/22 1100   Drainage Characteristics/Odor Serous 05/06/22 1100   Appearance Red;White;Granulating;Fibrin 05/06/22 1100   Tissue loss description Full thickness 05/06/22 1100   Red (%), Wound Tissue Color 95 % 05/06/22 1100   Periwound Area Intact;Scar tissue 05/06/22 1100   Wound Edges Defined 05/06/22 1100   Wound Length (cm) 1.5 cm 05/06/22 1100   Wound Width (cm) 1.2 cm 05/06/22 1100   Wound Depth (cm) 0.1 cm 05/06/22 1100   Wound Volume (cm^3) 0.18 cm^3 05/06/22 1100   Wound Surface Area (cm^2) 1.8 cm^2 05/06/22 1100   Care Cleansed with:;Sterile normal saline 05/06/22 1100   Dressing Removed;Honey;Sodium chloride impregnated;Gauze 05/06/22 1100         Assessment:     pt wounds improving continue current plan of care    ICD-10-CM ICD-9-CM   1. Pressure injury of left elbow, stage 3  L89.023 707.01     707.23   2. Pressure injury of right heel, stage 3  L89.613 707.07     707.23   3. Abscess of calf  L02.419 682.6         Plan:   Tissue pathology and/or culture taken:  [] Yes [x] No   Sharp debridement performed:   [] Yes [x] No   Labs ordered this visit:   [] Yes [x] No   Imaging ordered this visit:   [] Yes [x] No           No orders of the defined types were placed in this encounter.       Follow up in about 2 weeks (around 5/20/2022).   All wounds:  Cleanse wound with: NS, soap and water, or wound cleanser  Periwound care: Skin prep PRN  Primary dressing: medihoney/mesalt  Secondary dressing:gauze / abd prn/ cover dressing  Offloading:  Edema control: Tubigrip E   Frequency: daily  Follow-up: 2 weeks

## 2022-05-17 ENCOUNTER — OFFICE VISIT (OUTPATIENT)
Dept: WOUND CARE | Facility: HOSPITAL | Age: 57
End: 2022-05-17
Attending: PEDIATRICS
Payer: MEDICARE

## 2022-05-17 VITALS
TEMPERATURE: 98 F | SYSTOLIC BLOOD PRESSURE: 104 MMHG | RESPIRATION RATE: 18 BRPM | DIASTOLIC BLOOD PRESSURE: 72 MMHG | HEART RATE: 75 BPM | OXYGEN SATURATION: 97 %

## 2022-05-17 DIAGNOSIS — L02.419 ABSCESS OF CALF: ICD-10-CM

## 2022-05-17 DIAGNOSIS — L89.023: Primary | ICD-10-CM

## 2022-05-17 DIAGNOSIS — L89.613 PRESSURE INJURY OF RIGHT HEEL, STAGE 3: ICD-10-CM

## 2022-05-17 PROCEDURE — 99213 OFFICE O/P EST LOW 20 MIN: CPT | Mod: ,,, | Performed by: PEDIATRICS

## 2022-05-17 PROCEDURE — 99213 OFFICE O/P EST LOW 20 MIN: CPT

## 2022-05-17 PROCEDURE — 99213 PR OFFICE/OUTPT VISIT, EST, LEVL III, 20-29 MIN: ICD-10-PCS | Mod: ,,, | Performed by: PEDIATRICS

## 2022-05-17 RX ORDER — OXYBUTYNIN CHLORIDE 5 MG/1
5 TABLET ORAL 3 TIMES DAILY
Status: ON HOLD | COMMUNITY
End: 2022-09-07

## 2022-05-17 RX ORDER — FLUTICASONE PROPIONATE 50 MCG
2 SPRAY, SUSPENSION (ML) NASAL DAILY
Status: ON HOLD | COMMUNITY
Start: 2022-05-04 | End: 2023-09-27

## 2022-05-17 RX ORDER — IMIPRAMINE HYDROCHLORIDE 10 MG/1
10 TABLET, FILM COATED ORAL DAILY
Status: ON HOLD | COMMUNITY
End: 2022-07-19

## 2022-05-17 RX ORDER — HUMAN INSULIN 100 [IU]/ML
55 INJECTION, SOLUTION SUBCUTANEOUS
Status: ON HOLD | COMMUNITY
Start: 2022-02-04 | End: 2023-05-25

## 2022-05-17 RX ORDER — INSULIN GLARGINE 100 [IU]/ML
70 INJECTION, SOLUTION SUBCUTANEOUS NIGHTLY
Status: ON HOLD | COMMUNITY
End: 2023-05-25 | Stop reason: HOSPADM

## 2022-05-17 RX ORDER — APIXABAN 5 MG/1
5 TABLET, FILM COATED ORAL DAILY
Status: ON HOLD | COMMUNITY
Start: 2022-04-25 | End: 2022-09-07 | Stop reason: HOSPADM

## 2022-05-17 RX ORDER — LOPERAMIDE HYDROCHLORIDE 2 MG/1
4 CAPSULE ORAL DAILY
COMMUNITY
End: 2022-08-09

## 2022-05-17 RX ORDER — LEVMETAMFETAMINE 50 MG
500 INHALER (EA) NASAL DAILY
COMMUNITY
End: 2022-08-09

## 2022-05-17 RX ORDER — SITAGLIPTIN 50 MG/1
100 TABLET, FILM COATED ORAL DAILY
Status: ON HOLD | COMMUNITY
Start: 2022-05-04

## 2022-05-17 RX ORDER — MIRABEGRON 50 MG/1
1 TABLET, FILM COATED, EXTENDED RELEASE ORAL DAILY
Status: ON HOLD | COMMUNITY
Start: 2022-05-04 | End: 2023-05-25

## 2022-05-17 RX ORDER — METOPROLOL SUCCINATE 50 MG/1
50 TABLET, EXTENDED RELEASE ORAL 2 TIMES DAILY
COMMUNITY
Start: 2022-04-28 | End: 2022-09-27

## 2022-05-17 RX ORDER — DIPHENHYDRAMINE HCL 25 MG
25 TABLET ORAL NIGHTLY PRN
COMMUNITY
End: 2022-08-09

## 2022-05-17 NOTE — PROGRESS NOTES
Subjective:       Patient ID: Antonio Galvan II is a 56 y.o. male.    Chief Complaint: Wound Check    HPI  Review of Systems      Objective:        Physical Exam       Altered Skin Integrity 05/06/22 1140 Right Heel  Full thickness tissue loss. Subcutaneous fat may be visible but bone, tendon or muscle are not exposed (Active)   05/06/22 1140   Altered Skin Integrity Present on Admission:    Side: Right   Orientation:    Location: Heel   Wound Number:    Is this injury device related?: No   Primary Wound Type:    Description of Altered Skin Integrity: Full thickness tissue loss. Subcutaneous fat may be visible but bone, tendon or muscle are not exposed   Removal Indication and Assessment:    Wound Outcome:    (Retired) Wound Length (cm):    (Retired) Wound Width (cm):    (Retired) Depth (cm):    Wound Description (Comments):    Removal Indications:    Wound Image   05/17/22 1139   Description of Altered Skin Integrity Full thickness tissue loss. Subcutaneous fat may be visible but bone, tendon or muscle are not exposed 05/17/22 1139   Dressing Appearance Intact;Moist drainage 05/17/22 1139   Drainage Amount Moderate 05/17/22 1139   Drainage Characteristics/Odor Serosanguineous 05/17/22 1139   Appearance Red;Pink;Granulating 05/17/22 1139   Tissue loss description Full thickness 05/17/22 1139   Red (%), Wound Tissue Color 100 % 05/17/22 1139   Periwound Area Intact 05/17/22 1139   Wound Edges Defined 05/17/22 1139   Wound Length (cm) 4.5 cm 05/17/22 1139   Wound Width (cm) 3 cm 05/17/22 1139   Wound Depth (cm) 0.1 cm 05/17/22 1139   Wound Volume (cm^3) 1.35 cm^3 05/17/22 1139   Wound Surface Area (cm^2) 13.5 cm^2 05/17/22 1139   Care Cleansed with:;Sterile normal saline 05/17/22 1139   Dressing Changed;Honey;Sodium chloride impregnated;Gauze;Absorptive Pad;Rolled gauze 05/17/22 1139   Periwound Care Skin barrier film applied 05/17/22 1139            Altered Skin Integrity 05/06/22 1147 Right posterior Calf  Abscess Full thickness tissue loss. Subcutaneous fat may be visible but bone, tendon or muscle are not exposed (Active)   05/06/22 1147   Altered Skin Integrity Present on Admission:    Side: Right   Orientation: posterior   Location: Calf   Wound Number:    Is this injury device related?: No   Primary Wound Type: Abscess   Description of Altered Skin Integrity: Full thickness tissue loss. Subcutaneous fat may be visible but bone, tendon or muscle are not exposed   Removal Indication and Assessment:    Wound Outcome:    (Retired) Wound Length (cm):    (Retired) Wound Width (cm):    (Retired) Depth (cm):    Wound Description (Comments):    Removal Indications:    Wound Image   05/17/22 1137   Description of Altered Skin Integrity Full thickness tissue loss. Subcutaneous fat may be visible but bone, tendon or muscle are not exposed 05/17/22 1137   Dressing Appearance Intact;Moist drainage 05/17/22 1137   Drainage Amount Small 05/17/22 1137   Drainage Characteristics/Odor Serosanguineous 05/17/22 1137   Appearance Red;Pink;Fibrin;Granulating 05/17/22 1137   Tissue loss description Full thickness 05/17/22 1137   Red (%), Wound Tissue Color 100 % 05/17/22 1137   Periwound Area Intact 05/17/22 1137   Wound Edges Defined 05/17/22 1137   Wound Length (cm) 2 cm 05/17/22 1137   Wound Width (cm) 2 cm 05/17/22 1137   Wound Depth (cm) 0.3 cm 05/17/22 1137   Wound Volume (cm^3) 1.2 cm^3 05/17/22 1137   Wound Surface Area (cm^2) 4 cm^2 05/17/22 1137   Care Cleansed with:;Sterile normal saline 05/17/22 1137   Dressing Changed;Honey;Sodium chloride impregnated;Gauze 05/17/22 1137   Packing packed with 05/17/22 1137   Periwound Care Skin barrier film applied 05/17/22 1137            Altered Skin Integrity Left posterior Arm Other (comment) (Active)       Altered Skin Integrity Present on Admission:    Side: Left   Orientation: posterior   Location: Arm   Wound Number:    Is this injury device related?:    Primary Wound Type: Other    Description of Altered Skin Integrity:    Removal Indication and Assessment:    Wound Outcome:    (Retired) Wound Length (cm):    (Retired) Wound Width (cm):    (Retired) Depth (cm):    Wound Description (Comments):    Removal Indications:    Wound Image   05/17/22 1129   Description of Altered Skin Integrity Full thickness tissue loss. Subcutaneous fat may be visible but bone, tendon or muscle are not exposed 05/17/22 1129   Dressing Appearance Intact;Moist drainage 05/17/22 1129   Drainage Amount Small 05/17/22 1129   Drainage Characteristics/Odor Serosanguineous 05/17/22 1129   Appearance Red;Pink;Granulating 05/17/22 1129   Tissue loss description Full thickness 05/17/22 1129   Red (%), Wound Tissue Color 100 % 05/17/22 1129   Periwound Area Intact 05/17/22 1129   Wound Edges Defined 05/17/22 1129   Wound Length (cm) 1 cm 05/17/22 1129   Wound Width (cm) 1 cm 05/17/22 1129   Wound Depth (cm) 0.1 cm 05/17/22 1129   Wound Volume (cm^3) 0.1 cm^3 05/17/22 1129   Wound Surface Area (cm^2) 1 cm^2 05/17/22 1129   Care Cleansed with:;Sterile normal saline 05/17/22 1129   Dressing Changed;Honey;Sodium chloride impregnated;Gauze 05/17/22 1129   Periwound Care Skin barrier film applied 05/17/22 1129         Assessment:     patient here today for re-evaluation with this pressure wounds.  Left elbow wound is smaller and is now 1 cm x 1 cm.  Area of heel shows good granulation tissue and posterior calf with good granulation also.  All areas were cleaned with normal saline and collagen applied with cover dressings.  Tubigrip E was applied to both lower extremities.  Patient will clean with normal saline every other day and apply collagen and cover dressing.  Patient return in 2 weeks.    ICD-10-CM ICD-9-CM   1. Pressure injury of left elbow, stage 3  L89.023 707.01     707.23   2. Pressure injury of right heel, stage 3  L89.613 707.07     707.23   3. Abscess of calf  L02.419 682.6         Plan:   Tissue pathology and/or  culture taken:  [] Yes [x] No   Sharp debridement performed:   [] Yes [x] No   Labs ordered this visit:   [] Yes [x] No   Imaging ordered this visit:   [] Yes [x] No           Orders Placed This Encounter   Procedures    Change dressing     Cleanse wound with: NS, soap and water, or wound cleanser  Lidocaine: Lidocaine 2% gel PRN in wound care center  Periwound care: Skin prep  Primary dressing: Collagen   Secondary dressing: Gauze, tape to other sites and gauze, abd pad, kerlix, tape to R heel   Edema control: Tubigrip E   Frequency: Every other day   Follow-up: 2 weeks   Home Health: No   Other Orders: None     Standing Status:   Standing     Number of Occurrences:   50     Standing Expiration Date:   8/17/2022        Follow up in about 2 weeks (around 5/31/2022) for MD Visit .

## 2022-05-17 NOTE — PATIENT INSTRUCTIONS
Clean all areas with normal saline or wound cleanser.  L Elbow: Apply collagen and gauze/tape or another cover dressing. Change every other day  R Heel: Apply collagen to wound bed and cover with gauze, abd, kerlix, tape. Change every other day  R posterior leg: Apply collagen to wound bed and cover with gauze and tape or another cover dressing. Change every other day.  If deterioration is noted or wound beds do not continue to improve with collagen, switch back to Medihoney, Mesalt and cover dressings DAILY to all sites.  Tubigrip E double layered to RLE.

## 2022-05-20 NOTE — HISTORICAL [facility]ERNER
This is a historical note converted from [facility]. Formatting and pictures may have been removed.  Please reference Becca for original formatting and attached multimedia. Chief Complaint  New wound to R posterior lower leg, deterioration to ?L posterior elbow. Increase in size of all wounds except for thigh. ?Reports blood sugars running high  History of Present Illness  Incision/Wounds  ?1. Heel Right Pressure ulcer?- last charted: 04/26/2022 15:57  ?? ??Assessment Done By?- Wound Care Team  ?? ??Pressure Point?- Bony prominence  ?? ??Dressing Type?- ABD dressing pad, Gauze dressing, Honey, Medicated packing strips, Rolled Gauze, Tape, Tubular bandage  ?? ??Dressing Assessment?- Drainage present, Intact  ?? ??Dressing Activity?- Changed  ?? ??Cleansing?- Cleaned with normal saline  ?? ??Length?- 4.1 cm  ?? ??Width?- 3.0 cm  ?? ??Depth?- 0.1 cm  ?? ??Wound Bed Tissue Type?- Ecchymotic, Granulating  ?? ??Percent Granulated?- 80 %  ?? ??Percent Epithelialized?- 20 %  ?? ??Pressure Ulcer Stage?- III  ?? ??Exudate Amount?- Moderate  ?? ??Exudate Type?- Serosanguineous  ?? ??Exudate Odor?- None  ?? ??Edge?- Well defined  ?? ??Surrounding Tissue Color?- Erythema  ?? ??Surrounding Tissue Condition?- Ecchymotic, Intact, Maceration  ?  ?2. Ankle Right Anterior Pressure ulcer?- last charted: 04/26/2022 15:57  ?? ??Assessment Done By?- Wound Care Team  ?? ??Dressing Type?- Gauze dressing, Medicated packing strips  ?? ??Dressing Assessment?- Drainage present, Intact  ?? ??Dressing Activity?- Changed  ?? ??Cleansing?- Cleaned with normal saline  ?? ??Length?- 0.5 cm  ?? ??Width?- 0.5 cm  ?? ??Depth?- 0.1 cm  ?? ??Wound Bed Tissue Type?- Granulating  ?? ??Exudate Amount?- Scant  ?? ??Exudate Type?- Serous  ?? ??Exudate Odor?- None  ?? ??Edge?- Well defined  ?? ??Surrounding Tissue Color?- Erythema  ?? ??Surrounding Tissue Condition?- Edematous  ?  ?3. Thigh Right Lateral Pressure ulcer?- last charted: 04/26/2022 15:57  ??  ??Assessment Done By?- Wound Care Team  ?? ??Abnormality Pattern?- Flat  ?? ??Abnormality Color?- Brown  ?? ??Dressing Type?- Band-Aid  ?? ??Dressing Assessment?- Dry, Intact  ?? ??Dressing Activity?- Changed  ?? ??Cleansing?- Cleaned with normal saline  ?? ??Length?- 0.1 cm  ?? ??Width?- 0.1 cm  ?? ??Wound Bed Tissue Type?- Scab  ?? ??Percent Epithelialized?- 100 %  ?? ??Pressure Ulcer Stage?- III  ?? ??Exudate Amount?- None  ?? ??Exudate Odor?- None  ?? ??Edge?- Well defined  ?? ??Surrounding Tissue Color?- Normal  ?? ??Surrounding Tissue Condition?- Intact  ?? ??Status?- Improving  ?  ?4. Elbow Left Posterior Tear?- last charted: 04/26/2022 15:57  ?? ??Assessment Done By?- Wound Care Team  ?? ??Pressure Point?- Bony prominence  ?? ??Dressing Type?- Gauze dressing, Honey, Medicated packing strips, Tape  ?? ??Dressing Assessment?- Drainage present, Intact  ?? ??Dressing Activity?- Changed  ?? ??Cleansing?- Cleaned with normal saline  ?? ??Length?- 2.8 cm  ?? ??Width?- 1.5 cm  ?? ??Depth?- 0.1 cm  ?? ??Wound Bed Tissue Type?- Granulating, Necrotic tissue, slough  ?? ??Percent Granulated?- 80 %  ?? ??Percent Necrotic Tissue Slough?- 20 %  ?? ??Pressure Ulcer Stage?- III  ?? ??Exudate Amount?- Moderate  ?? ??Exudate Type?- Serosanguineous  ?? ??Exudate Odor?- None  ?? ??Edge?- Well defined  ?? ??Surrounding Tissue Color?- Erythema  ?? ??Surrounding Tissue Condition?- Intact  ?? ??Status?- Improving  ?  ?5. Leg Right Lower, Posterior Pressure ulcer?- last charted: 04/26/2022 15:57  ?? ??Assessment Done By?- Wound Care Team  ?? ??Abnormality Pattern?- Flat  ?? ??Abnormality Color?- Brown, Red, Tan, White  ?? ??Rash Distribution?- Localized  ?? ??Pressure Point?- Bony prominence  ?? ??Dressing Type?- Gauze dressing, Honey, Medicated packing strips, Tape  ?? ??Dressing Assessment?- Drainage present, Intact  ?? ??Dressing Activity?- Changed  ?? ??Cleansing?- Cleaned with normal saline  ?? ??Length?- 2.0 cm  ?? ??Width?-  2.0 cm  ?? ??Depth?- 1.0 cm  ?? ??Wound Bed Tissue Type?- Granulating, Necrotic tissue, slough, Stringy  ?? ??Pressure Ulcer Stage?- III  ?? ??Exudate Amount?- Moderate  ?? ??Exudate Type?- Seropurulent  ?? ??Exudate Odor?- None  ?? ??Edge?- Well defined  ?? ??Surrounding Tissue Color?- Erythema  ?? ??Surrounding Tissue Condition?- Edematous  ?? ??Status?- Improving  Today right thigh lateral pressure ulcer is?100% ill with epithelialized. ?We will clean this daily and apply Band-Aid to the area to protect it.? All other wounds?are improving?they are?granulating?some with some slough?but otherwise doing well.? All other wounds were cleaned and?Medihoney Mesalt gauze and tape were applied.? She will continue to do this home and change dressings daily.? Wound culture?grew?Morganella and Serratia?Proteus mirabilis?MRSA?and?Providencia Bactrim covered for the organisms and Augmentin covered?the Proteus.? Patient will be instructed to stop clindamycin and?Levaquin.? Patient will continue on Bactrim.? Patient will also start Augmentin?1 tablet 3 times a day?for 10 days.? Patient will return in 2 weeks for follow-up.  Physical Exam  Vitals & Measurements  T:?37? ?C (Oral)? HR:?93(Peripheral)? RR:?18? BP:?106/74? SpO2:?100%?  ?  Assessment/Plan  1.?Skin tear of left elbow without complication?S51.012A  Ordered:  amoxicillin-clavulanate, = 1 tab(s), Oral, q8hr, X 10 day(s), # 30 tab(s), 0 Refill(s), Pharmacy: [facility], 172.7, cm, Height/Length Dosing, 05/19/20 14:26:00 CDT, 102, kg, Weight Dosing, 05/19/20 14:26:00 CDT  Wound Care Outpatient, *Est. 05/03/22 3:00:00 CDT, *Est. Stop date 05/03/22 3:00:00 CDT, [facility], FU with Physician in 2 weeks  Wound Care Team Treatment, 04/26/22 16:35:00 CDT, Stop date 04/26/22 16:35:00 CDT, Clean areas daily with normal saline. To the right heel left elbow right ankle and right leg apply Medihoney Mesalt gauze and tape. Right thigh wound clean daily and apply  Band-Aid. Patient is...  ?  2.?Pressure ulcer of right leg, unstageable?L89.890  Ordered:  amoxicillin-clavulanate, = 1 tab(s), Oral, q8hr, X 10 day(s), # 30 tab(s), 0 Refill(s), Pharmacy: [facility], 172.7, cm, Height/Length Dosing, 05/19/20 14:26:00 CDT, 102, kg, Weight Dosing, 05/19/20 14:26:00 CDT  Wound Care Outpatient, *Est. 05/03/22 3:00:00 CDT, *Est. Stop date 05/03/22 3:00:00 CDT, [facility], FU with Physician in 2 weeks  Wound Care Team Treatment, 04/26/22 16:35:00 CDT, Stop date 04/26/22 16:35:00 CDT, Clean areas daily with normal saline. To the right heel left elbow right ankle and right leg apply Medihoney Mesalt gauze and tape. Right thigh wound clean daily and apply Band-Aid. Patient is...  ?  3.?Pressure ulcer of right heel, stage 3?L89.613  Ordered:  amoxicillin-clavulanate, = 1 tab(s), Oral, q8hr, X 10 day(s), # 30 tab(s), 0 Refill(s), Pharmacy: [facility], 172.7, cm, Height/Length Dosing, 05/19/20 14:26:00 CDT, 102, kg, Weight Dosing, 05/19/20 14:26:00 CDT  Wound Care Outpatient, *Est. 05/03/22 3:00:00 CDT, *Est. Stop date 05/03/22 3:00:00 CDT, [facility], FU with Physician in 2 weeks  Wound Care Team Treatment, 04/26/22 16:35:00 CDT, Stop date 04/26/22 16:35:00 CDT, Clean areas daily with normal saline. To the right heel left elbow right ankle and right leg apply Medihoney Mesalt gauze and tape. Right thigh wound clean daily and apply Band-Aid. Patient is...  ?  4.?Toe ulcer?L97.509  Ordered:  amoxicillin-clavulanate, = 1 tab(s), Oral, q8hr, X 10 day(s), # 30 tab(s), 0 Refill(s), [facility], 172.7, cm, Height/Length Dosing, 05/19/20 14:26:00 CDT, 102, kg, Weight Dosing, 05/19/20 14:26:00 CDT  Wound Care Outpatient, *Est. 05/03/22 3:00:00 CDT, *Est. Stop date 05/03/22 3:00:00 CDT, [facility], FU with Physician in 2 weeks  Wound Care Team Treatment, 04/26/22 16:35:00 CDT, Stop date 04/26/22 16:35:00 CDT, Clean areas daily with  normal saline. To the right heel left elbow right ankle and right leg apply Medihoney Mesalt gauze and tape. Right thigh wound clean daily and apply Band-Aid. Patient is...  ?  5.?Lymphedema of leg?I89.0  Ordered:  amoxicillin-clavulanate, = 1 tab(s), Oral, q8hr, X 10 day(s), # 30 tab(s), 0 Refill(s), Pharmacy: [facility], 172.7, cm, Height/Length Dosing, 05/19/20 14:26:00 CDT, 102, kg, Weight Dosing, 05/19/20 14:26:00 CDT  Wound Care Outpatient, *Est. 05/03/22 3:00:00 CDT, *Est. Stop date 05/03/22 3:00:00 CDT, [facility], FU with Physician in 2 weeks  Wound Care Team Treatment, 04/26/22 16:35:00 CDT, Stop date 04/26/22 16:35:00 CDT, Clean areas daily with normal saline. To the right heel left elbow right ankle and right leg apply Medihoney Mesalt gauze and tape. Right thigh wound clean daily and apply Band-Aid. Patient is...  ?  6.?Quadriplegia?G82.50  Ordered:  amoxicillin-clavulanate, = 1 tab(s), Oral, q8hr, X 10 day(s), # 30 tab(s), 0 Refill(s), Pharmacy: [facility], 172.7, cm, Height/Length Dosing, 05/19/20 14:26:00 CDT, 102, kg, Weight Dosing, 05/19/20 14:26:00 CDT  Wound Care Outpatient, *Est. 05/03/22 3:00:00 CDT, *Est. Stop date 05/03/22 3:00:00 CDT, [facility], FU with Physician in 2 weeks  Wound Care Team Treatment, 04/26/22 16:35:00 CDT, Stop date 04/26/22 16:35:00 CDT, Clean areas daily with normal saline. To the right heel left elbow right ankle and right leg apply Medihoney Mesalt gauze and tape. Right thigh wound clean daily and apply Band-Aid. Patient is...  ?  7.?Pressure ulcer of left elbow, unstageable?L89.020  Ordered:  amoxicillin-clavulanate, = 1 tab(s), Oral, q8hr, X 10 day(s), # 30 tab(s), 0 Refill(s), Pharmacy: [facility], 172.7, cm, Height/Length Dosing, 05/19/20 14:26:00 CDT, 102, kg, Weight Dosing, 05/19/20 14:26:00 CDT  Wound Care Outpatient, *Est. 05/03/22 3:00:00 CDT, *Est. Stop date 05/03/22 3:00:00 CDT, [facility], GINO with  Physician in 2 weeks  Wound Care Team Treatment, 04/26/22 16:35:00 CDT, Stop date 04/26/22 16:35:00 CDT, Clean areas daily with normal saline. To the right heel left elbow right ankle and right leg apply Medihoney Mesalt gauze and tape. Right thigh wound clean daily and apply Band-Aid. Patient is...  ?  8.?Pressure ulcer of right calf, stage 3?L89.893  Ordered:  amoxicillin-clavulanate, = 1 tab(s), Oral, q8hr, X 10 day(s), # 30 tab(s), 0 Refill(s), Pharmacy: [facility], 172.7, cm, Height/Length Dosing, 05/19/20 14:26:00 CDT, 102, kg, Weight Dosing, 05/19/20 14:26:00 CDT  Wound Care Outpatient, *Est. 05/03/22 3:00:00 CDT, *Est. Stop date 05/03/22 3:00:00 CDT, [facility],  with Physician in 2 weeks  Wound Care Team Treatment, 04/26/22 16:35:00 CDT, Stop date 04/26/22 16:35:00 CDT, Clean areas daily with normal saline. To the right heel left elbow right ankle and right leg apply Medihoney Mesalt gauze and tape. Right thigh wound clean daily and apply Band-Aid. Patient is...  ?  9.?DM (diabetes mellitus)?E11.9  Ordered:  amoxicillin-clavulanate, = 1 tab(s), Oral, q8hr, X 10 day(s), # 30 tab(s), 0 Refill(s), Pharmacy: [facility], 172.7, cm, Height/Length Dosing, 05/19/20 14:26:00 CDT, 102, kg, Weight Dosing, 05/19/20 14:26:00 CDT  Wound Care Outpatient, *Est. 05/03/22 3:00:00 CDT, *Est. Stop date 05/03/22 3:00:00 CDT, [facility],  with Physician in 2 weeks  Wound Care Team Treatment, 04/26/22 16:35:00 CDT, Stop date 04/26/22 16:35:00 CDT, Clean areas daily with normal saline. To the right heel left elbow right ankle and right leg apply Medihoney Mesalt gauze and tape. Right thigh wound clean daily and apply Band-Aid. Patient is...  ?   Problem List/Past Medical History  Ongoing  Chronic skin ulcer  DM (diabetes mellitus)  Infected decubitus ulcer  Lymphedema of leg  Neurogenic bladder  Obesity  Open wound of abdomen  Pressure ulcer of heel  Pressure ulcer of right  ischium  Quadriplegia  Quadriplegic spinal paralysis  Skin eschar  Historical  Pressure ulcer of right foot stage 3  Medications  Augmentin 500 mg-125 mg oral tablet, 1 tab(s), Oral, TID  Augmentin 500 mg-125 mg oral tablet, 1 tab(s), Oral, q8hr  Bactrim  mg-160 mg oral tablet, 1 tab(s), Oral, BID  Bactroban 2% topical ointment, 1 aruna, TOP, TID,? ?Not Taking, Completed Rx  BASAGLAR INJ 100UNIT  Bydureon BCise 2 mg/0.85 mL subcutaneous suspension, extended release,? ?Not Taking per Prescriber  Ciprofloxacin Hcl 500 Mg Tab, 500 mg= 1 tab(s), BID,? ?Not Taking, Completed Rx  clindamycin 150 mg oral capsule, 300 mg= 2 cap(s), Oral, q6hr,? ?Not Taking, Completed Rx  clindamycin 300 mg oral capsule, 300 mg= 1 cap(s), Oral, q6hr  Eliquis Starter Pack for Treatment of DVT and PE 5 mg oral tablet, 5 mg= 1 tab(s), Oral, BID  gentamicin 0.1% topical ointment, 1 aruna, TOP, Daily,? ?Not Taking, Completed Rx  gentamicin 0.1% topical ointment, 1 aruna, TOP, Daily, 2 refills  GLIPIZIDE TAB 10MG, 10 mg= 1 tab(s), Oral, BID,? ?Not Taking per Prescriber  JANUVIA TAB 100MG, 100 mg= 1 tab(s), Oral, Daily,? ?Not Taking per Prescriber  Januvia 50 mg oral tablet, 50 mg= 1 tab(s), Oral, Daily  Ketoconazole 2% Shampoo,? ?Not Taking, Completed Rx  Levaquin 500 mg oral tablet, 500 mg= 1 tab(s), Oral, q24hr  Levofloxacin 500 Mg Tablet, 500 mg= 1 tab(s), Oral, Daily,? ?Not Taking, Completed Rx  metoprolol succinate 50 mg oral tablet, extended release, 50 mg= 1 tab(s), Oral, BID  MUPIROCIN OIN 2%,? ?Not taking  MYRBETRIQ TAB 50MG, 50 mg= 1 tab(s), Oral, Daily  OXYBUTYNIN TAB 5MG,? ?Not Taking per Prescriber  ReliOn/NovoLIN R 100 units/mL injectable solution  sulfamethoxazole-trimethoprim 800 mg-160 mg oral tablet, 1 tab(s), Oral, BID,? ?Not Taking, Completed Rx  Tramadol Hcl Tab 50 Mg, 50 mg= 1 tab(s), Oral, q4-6hr,? ?Not Taking, Completed Rx  Allergies  No Known Allergies  Social History  Abuse/Neglect  No, 05/19/2020  Tobacco  Never (less  than 100 in lifetime), N/A, 05/19/2020  Never smoker, 05/01/2017  Health Maintenance  Health Maintenance  ???Pending?(in the next year)  ??? ??OverDue  ??? ? ? ?ADL Screening due??05/19/21??and every 1??year(s)  ??? ? ? ?Obesity Screening due??01/01/22??and every 1??year(s)  ??? ? ? ?Alcohol Misuse Screening due??01/02/22??and every 1??year(s)  ??? ??Due?  ??? ? ? ?Aspirin Therapy for CVD Prevention due??04/26/22??and every 1??year(s)  ??? ? ? ?Medicare Annual Wellness Exam due??04/26/22??and every 1??year(s)  ??? ? ? ?Tetanus Vaccine due??04/26/22??and every 10??year(s)  ???Satisfied?(in the past 1 year)  ??? ??Satisfied?  ??? ? ? ?Blood Pressure Screening on??04/26/22.??Satisfied by Nguyen Pacheco RN  ??? ? ? ?Diabetes Screening on??04/19/22.??Satisfied by Eliezer Pradhan  ??? ? ? ?Lipid Screening on??04/19/22.??Satisfied by Eliezer Pradhan  ?

## 2022-05-20 NOTE — HISTORICAL [facility]
This is a historical note converted from [facility]. Formatting and pictures may have been removed.  Please reference Becca for original formatting and attached multimedia. Chief Complaint  New wound to R posterior lower leg, deterioration to ?L posterior elbow. Increase in size of all wounds except for thigh. ?Reports blood sugars running high  History of Present Illness  Incision/Wounds  ?1. Heel Right Pressure ulcer?- last charted: 04/19/2022 11:31  ?? ??Assessment Done By?- Wound Care Team  ?? ??Pressure Point?- Bony prominence  ?? ??Dressing Type?- ABD dressing pad, Gauze dressing, Honey, Medicated packing strips, Rolled Gauze, Tape, Tubular bandage  ?? ??Dressing Assessment?- Drainage present, Intact  ?? ??Dressing Activity?- Changed  ?? ??Cleansing?- Cleaned with normal saline  ?? ??Length?- 3.8 cm  ?? ??Width?- 2.2 cm  ?? ??Depth?- 0.1 cm  ?? ??Wound Bed Tissue Type?- Ecchymotic, Fibrotic, Granulating, Pale Pink  ?? ??Percent Granulated?- 100 %  ?? ??Pressure Ulcer Stage?- III  ?? ??Exudate Amount?- Moderate  ?? ??Exudate Type?- Serosanguineous  ?? ??Exudate Odor?- None  ?? ??Edge?- Well defined  ?? ??Surrounding Tissue Color?- Erythema  ?? ??Surrounding Tissue Condition?- Ecchymotic, Intact, Maceration  ?? ??Status?- Deteriorating  ?  ?2. Ankle Right Anterior Pressure ulcer?- last charted: 04/19/2022 11:31  ?? ??Assessment Done By?- Wound Care Team  ?? ??Dressing Type?- Gauze dressing, Medicated packing strips  ?? ??Dressing Assessment?- Drainage present, Intact  ?? ??Dressing Activity?- Changed  ?? ??Cleansing?- Cleaned with normal saline  ?? ??Length?- 0.7 cm  ?? ??Width?- 0.7 cm  ?? ??Depth?- 0.2 cm  ?? ??Wound Bed Tissue Type?- Blood Blister, Granulating  ?? ??Exudate Amount?- Small  ?? ??Exudate Type?- Serosanguineous  ?? ??Exudate Odor?- None  ?? ??Edge?- Well defined  ?? ??Surrounding Tissue Color?- Erythema  ?? ??Surrounding Tissue Condition?- Edematous  ?? ??Status?- Deteriorating  ?  ?3. Thigh Right  Lateral Pressure ulcer?- last charted: 04/19/2022 11:31  ?? ??Assessment Done By?- Wound Care Team  ?? ??Abnormality Pattern?- Flat  ?? ??Abnormality Color?- Brown, Red, Tan  ?? ??Dressing Type?- Gauze dressing, Tape  ?? ??Dressing Assessment?- Drainage present, Intact  ?? ??Dressing Activity?- Changed  ?? ??Cleansing?- Cleaned with normal saline  ?? ??Length?- 0.7 cm  ?? ??Width?- 0.4 cm  ?? ??Depth?- 0.1 cm  ?? ??Wound Bed Tissue Type?- Fibrotic, Granulating  ?? ??Percent Granulated?- 100 %  ?? ??Pressure Ulcer Stage?- III  ?? ??Exudate Amount?- Small  ?? ??Exudate Type?- Serous  ?? ??Exudate Odor?- None  ?? ??Edge?- Well defined  ?? ??Surrounding Tissue Color?- Normal  ?? ??Surrounding Tissue Condition?- Intact  ?? ??Status?- Improving  ?  ?4. Elbow Left Posterior Tear?- last charted: 04/19/2022 11:31  ?? ??Assessment Done By?- Wound Care Team  ?? ??Abnormality Color?- Black, Brown, Pink, Red, Tan  ?? ??Pressure Point?- Bony prominence  ?? ??Dressing Type?- Gauze dressing, Tape  ?? ??Dressing Assessment?- Drainage present, Intact  ?? ??Dressing Activity?- Changed  ?? ??Cleansing?- Cleaned with normal saline  ?? ??Length?- 3.0 cm  ?? ??Width?- 2.5 cm  ?? ??Depth?- 0.1 cm  ?? ??Wound Bed Tissue Type?- Granulating, Necrotic tissue, slough  ?? ??Percent Granulated?- 85 %  ?? ??Percent Necrotic Tissue Slough?- 15 %  ?? ??Exudate Amount?- Moderate  ?? ??Exudate Type?- Serosanguineous  ?? ??Exudate Odor?- None  ?? ??Edge?- Well defined  ?? ??Surrounding Tissue Color?- Erythema  ?? ??Surrounding Tissue Condition?- Edematous  ?? ??Status?- Deteriorating  ?  ?5. Leg Right Lower, Posterior Pressure ulcer?- last charted: 04/19/2022 11:31  ?? ??Assessment Done By?- Wound Care Team  ?? ??Abnormality Pattern?- Flat  ?? ??Abnormality Color?- Black, Brown, Tan  ?? ??Rash Distribution?- Localized  ?? ??Pressure Point?- Bony prominence  ?? ??Dressing Type?- Gauze dressing, Tape  ?? ??Dressing Assessment?- Drainage present,  Intact  ?? ??Dressing Activity?- Removed  ?? ??Cleansing?- Commercial cleansing solution  ?? ??Length?- 1.4 cm  ?? ??Width?- 1.6 cm  ?? ??Depth?- 0.1 cm  ?? ??Wound Bed Tissue Type?- Moist, Necrotic tissue, eschar  ?? ??Percent Necrotic Tissue Eschar?- 100 %  ?? ??Exudate Amount?- Small  ?? ??Exudate Type?- Serous  ?? ??Exudate Odor?- None  ?? ??Edge?- Well defined  ?? ??Surrounding Tissue Color?- Erythema  ?? ??Surrounding Tissue Condition?- Edematous  Today patient comes in for his regular visit.? All wounds were unwrapped.? Today?right heel?showed a?increased and paleness of the area.? Anterior ankle?showed?some improvement.??Lateral?right?thigh wound?with good?granulation tissue.? This appears to be?improving.? Today?left elbow skin?tear has?increased to what appears to be an unstageable pressure pressure ulcer.? This is pale?tissue with?a necrotic?area..? This area was?cleaned?and prepped and a?excisional?debridement was done with?7 mm?curette.? This was taken to viable tissue.? 3.2 cm x 2.1 cm?to a depth of 0.2 cm with a total area of?6.72 cm?.? This area was then cleaned and Mesalt and Medihoney was applied a tissue culture was also taken.? Today there is a new?pressure ulcer?of the?right?lower leg?posterior.? This was?flat with?necrotic tissue and eschar.? There is 100%?eschared.? Area was cleaned?and excisional debridement was done?and large amount of eschar?and?necrotic tissue?with some purulent?areas was excised.? This was done using a 7 mm curette and scissors.? The debridement was 1.5 x 1.5 cm?in area with 0.8 cm in depth.? Total area was?2.25 cm.? A tissue culture was also taken of this particular area.? All wounds were dressed with?Medihoney and Mesalt packing?and?bandages.? Patient was started on clindamycin?1 capsule?4 times a day.? Patient was also sent for a CBC.? Patient will?return in 1 week for follow-up.  Physical Exam  Vitals & Measurements  T:?36.8? ?C (Oral)? HR:?58(Peripheral)? RR:?18?  BP:?95/67? SpO2:?98%?  ?  Assessment/Plan  1.?Skin tear of left elbow without complication?S51.012A  Ordered:  clindamycin, 300 mg = 1 cap(s), Oral, q6hr, X 10 day(s), # 40 cap(s), 0 Refill(s), Pharmacy: [facility], 172.7, cm, Height/Length Dosing, 05/19/20 14:26:00 CDT, 102, kg, Weight Dosing, 05/19/20 14:26:00 CDT  CBC w/ Auto Diff, Routine collect, 04/19/22 12:11:00 CDT, Blood, Order for future visit, Stop date 04/19/22 12:11:00 CDT, Lab Collect, Skin tear of left elbow without complication  Pressure ulcer of right leg, unstageable  Pressure ulcer of right heel, stage 3  Toe...  Tissue Culture - Aerobic, Routine collect, 04/19/22 12:11:00 CDT, Order for future visit, Tissue, Calf, Nurse collect, Stop date 04/19/22 12:11:00 CDT, Skin tear of left elbow without complication  Pressure ulcer of right leg, unstageable  Pressure ulcer of right heel, stage...  Wound Care Outpatient, *Est. 04/26/22 3:00:00 CDT, *Est. Stop date 04/26/22 3:00:00 CDT, [facility], FU with Physician in 1 week  Wound Care Team Treatment, 04/19/22 12:11:00 CDT, Stop date 04/19/22 12:11:00 CDT, Wounds are to be cleaned every day. Apply Mesalt Medihoney and gauze to all areas. Start clindamycin 1 tablet 4 times a day for 10 days. Return in 1 week for follow-up  ?  2.?Pressure ulcer of right leg, unstageable?L89.890  Ordered:  clindamycin, 300 mg = 1 cap(s), Oral, q6hr, X 10 day(s), # 40 cap(s), 0 Refill(s), [facility], 172.7, cm, Height/Length Dosing, 05/19/20 14:26:00 CDT, 102, kg, Weight Dosing, 05/19/20 14:26:00 CDT  CBC w/ Auto Diff, Routine collect, 04/19/22 12:11:00 CDT, Blood, Order for future visit, Stop date 04/19/22 12:11:00 CDT, Lab Collect, Skin tear of left elbow without complication  Pressure ulcer of right leg, unstageable  Pressure ulcer of right heel, stage 3  Toe...  Tissue Culture - Aerobic, Routine collect, 04/19/22 12:11:00 CDT, Order for future visit, Tissue, Calf, Nurse  collect, Stop date 04/19/22 12:11:00 CDT, Skin tear of left elbow without complication  Pressure ulcer of right leg, unstageable  Pressure ulcer of right heel, stage...  Wound Care Outpatient, *Est. 04/26/22 3:00:00 CDT, *Est. Stop date 04/26/22 3:00:00 CDT, [facility], FU with Physician in 1 week  Wound Care Team Treatment, 04/19/22 12:11:00 CDT, Stop date 04/19/22 12:11:00 CDT, Wounds are to be cleaned every day. Apply Mesalt Medihoney and gauze to all areas. Start clindamycin 1 tablet 4 times a day for 10 days. Return in 1 week for follow-up  ?  3.?Pressure ulcer of right heel, stage 3?L89.613  Ordered:  clindamycin, 300 mg = 1 cap(s), Oral, q6hr, X 10 day(s), # 40 cap(s), 0 Refill(s), [facility], 172.7, cm, Height/Length Dosing, 05/19/20 14:26:00 CDT, 102, kg, Weight Dosing, 05/19/20 14:26:00 CDT  CBC w/ Auto Diff, Routine collect, 04/19/22 12:11:00 CDT, Blood, Order for future visit, Stop date 04/19/22 12:11:00 CDT, Lab Collect, Skin tear of left elbow without complication  Pressure ulcer of right leg, unstageable  Pressure ulcer of right heel, stage 3  Toe...  Tissue Culture - Aerobic, Routine collect, 04/19/22 12:11:00 CDT, Order for future visit, Tissue, Calf, Nurse collect, Stop date 04/19/22 12:11:00 CDT, Skin tear of left elbow without complication  Pressure ulcer of right leg, unstageable  Pressure ulcer of right heel, stage...  Wound Care Outpatient, *Est. 04/26/22 3:00:00 CDT, *Est. Stop date 04/26/22 3:00:00 CDT, [facility], FU with Physician in 1 week  Wound Care Team Treatment, 04/19/22 12:11:00 CDT, Stop date 04/19/22 12:11:00 CDT, Wounds are to be cleaned every day. Apply Mesalt Medihoney and gauze to all areas. Start clindamycin 1 tablet 4 times a day for 10 days. Return in 1 week for follow-up  ?  4.?Toe ulcer?L97.509  Ordered:  clindamycin, 300 mg = 1 cap(s), Oral, q6hr, X 10 day(s), # 40 cap(s), 0 Refill(s), Pharmacy: [facility], 172.7,  cm, Height/Length Dosing, 05/19/20 14:26:00 CDT, 102, kg, Weight Dosing, 05/19/20 14:26:00 CDT  CBC w/ Auto Diff, Routine collect, 04/19/22 12:11:00 CDT, Blood, Order for future visit, Stop date 04/19/22 12:11:00 CDT, Lab Collect, Skin tear of left elbow without complication  Pressure ulcer of right leg, unstageable  Pressure ulcer of right heel, stage 3  Toe...  Tissue Culture - Aerobic, Routine collect, 04/19/22 12:11:00 CDT, Order for future visit, Tissue, Calf, Nurse collect, Stop date 04/19/22 12:11:00 CDT, Skin tear of left elbow without complication  Pressure ulcer of right leg, unstageable  Pressure ulcer of right heel, stage...  Wound Care Outpatient, *Est. 04/26/22 3:00:00 CDT, *Est. Stop date 04/26/22 3:00:00 CDT, [facility], FU with Physician in 1 week  Wound Care Team Treatment, 04/19/22 12:11:00 CDT, Stop date 04/19/22 12:11:00 CDT, Wounds are to be cleaned every day. Apply Mesalt Medihoney and gauze to all areas. Start clindamycin 1 tablet 4 times a day for 10 days. Return in 1 week for follow-up  ?  5.?Lymphedema of leg?I89.0  Ordered:  clindamycin, 300 mg = 1 cap(s), Oral, q6hr, X 10 day(s), # 40 cap(s), 0 Refill(s), Pharmacy: [facility], 172.7, cm, Height/Length Dosing, 05/19/20 14:26:00 CDT, 102, kg, Weight Dosing, 05/19/20 14:26:00 CDT  CBC w/ Auto Diff, Routine collect, 04/19/22 12:11:00 CDT, Blood, Order for future visit, Stop date 04/19/22 12:11:00 CDT, Lab Collect, Skin tear of left elbow without complication  Pressure ulcer of right leg, unstageable  Pressure ulcer of right heel, stage 3  Toe...  Tissue Culture - Aerobic, Routine collect, 04/19/22 12:11:00 CDT, Order for future visit, Tissue, Calf, Nurse collect, Stop date 04/19/22 12:11:00 CDT, Skin tear of left elbow without complication  Pressure ulcer of right leg, unstageable  Pressure ulcer of right heel, stage...  Wound Care Outpatient, *Est. 04/26/22 3:00:00 CDT, *Est. Stop date 04/26/22 3:00:00 CDT,   [facility], FU with Physician in 1 week  Wound Care Team Treatment, 04/19/22 12:11:00 CDT, Stop date 04/19/22 12:11:00 CDT, Wounds are to be cleaned every day. Apply Mesalt Medihoney and gauze to all areas. Start clindamycin 1 tablet 4 times a day for 10 days. Return in 1 week for follow-up  ?  6.?Quadriplegia?G82.50  Ordered:  clindamycin, 300 mg = 1 cap(s), Oral, q6hr, X 10 day(s), # 40 cap(s), 0 Refill(s), Pharmacy: [facility], 172.7, cm, Height/Length Dosing, 05/19/20 14:26:00 CDT, 102, kg, Weight Dosing, 05/19/20 14:26:00 CDT  CBC w/ Auto Diff, Routine collect, 04/19/22 12:11:00 CDT, Blood, Order for future visit, Stop date 04/19/22 12:11:00 CDT, Lab Collect, Skin tear of left elbow without complication  Pressure ulcer of right leg, unstageable  Pressure ulcer of right heel, stage 3  Toe...  Tissue Culture - Aerobic, Routine collect, 04/19/22 12:11:00 CDT, Order for future visit, Tissue, Calf, Nurse collect, Stop date 04/19/22 12:11:00 CDT, Skin tear of left elbow without complication  Pressure ulcer of right leg, unstageable  Pressure ulcer of right heel, stage...  Wound Care Outpatient, *Est. 04/26/22 3:00:00 CDT, *Est. Stop date 04/26/22 3:00:00 CDT, [facility], FU with Physician in 1 week  Wound Care Team Treatment, 04/19/22 12:11:00 CDT, Stop date 04/19/22 12:11:00 CDT, Wounds are to be cleaned every day. Apply Mesalt Medihoney and gauze to all areas. Start clindamycin 1 tablet 4 times a day for 10 days. Return in 1 week for follow-up  ?  7.?Pressure ulcer of left elbow, unstageable?L89.020  Ordered:  clindamycin, 300 mg = 1 cap(s), Oral, q6hr, X 10 day(s), # 40 cap(s), 0 Refill(s), Pharmacy: [facility], 172.7, cm, Height/Length Dosing, 05/19/20 14:26:00 CDT, 102, kg, Weight Dosing, 05/19/20 14:26:00 CDT  CBC w/ Auto Diff, Routine collect, 04/19/22 12:11:00 CDT, Blood, Order for future visit, Stop date 04/19/22 12:11:00 CDT, Lab Collect, Skin tear of left elbow  without complication  Pressure ulcer of right leg, unstageable  Pressure ulcer of right heel, stage 3  Toe...  Tissue Culture - Aerobic, Routine collect, 04/19/22 12:11:00 CDT, Order for future visit, Tissue, Calf, Nurse collect, Stop date 04/19/22 12:11:00 CDT, Skin tear of left elbow without complication  Pressure ulcer of right leg, unstageable  Pressure ulcer of right heel, stage...  Tissue Culture - Aerobic, Routine collect, 04/19/22 12:25:00 CDT, Order for future visit, Tissue, Elbow L, Nurse collect, Stop date 04/19/22 12:25:00 CDT, Pressure ulcer of left elbow, unstageable  Wound Care Outpatient, *Est. 04/26/22 3:00:00 CDT, *Est. Stop date 04/26/22 3:00:00 CDT, [facility], FU with Physician in 1 week  Wound Care Team Treatment, 04/19/22 12:11:00 CDT, Stop date 04/19/22 12:11:00 CDT, Wounds are to be cleaned every day. Apply Mesalt Medihoney and gauze to all areas. Start clindamycin 1 tablet 4 times a day for 10 days. Return in 1 week for follow-up  ?  8.?Pressure ulcer of right calf, stage 3?L89.893  Ordered:  clindamycin, 300 mg = 1 cap(s), Oral, q6hr, X 10 day(s), # 40 cap(s), 0 Refill(s), Pharmacy: [facility], 172.7, cm, Height/Length Dosing, 05/19/20 14:26:00 CDT, 102, kg, Weight Dosing, 05/19/20 14:26:00 CDT  CBC w/ Auto Diff, Routine collect, 04/19/22 12:11:00 CDT, Blood, Order for future visit, Stop date 04/19/22 12:11:00 CDT, Lab Collect, Skin tear of left elbow without complication  Pressure ulcer of right leg, unstageable  Pressure ulcer of right heel, stage 3  Toe...  Tissue Culture - Aerobic, Routine collect, 04/19/22 12:11:00 CDT, Order for future visit, Tissue, Calf, Nurse collect, Stop date 04/19/22 12:11:00 CDT, Skin tear of left elbow without complication  Pressure ulcer of right leg, unstageable  Pressure ulcer of right heel, stage...  Wound Care Outpatient, *Est. 04/26/22 3:00:00 CDT, *Est. Stop date 04/26/22 3:00:00 CDT, [facility], FU with Physician  in 1 week  Wound Care Team Treatment, 04/19/22 12:11:00 CDT, Stop date 04/19/22 12:11:00 CDT, Wounds are to be cleaned every day. Apply Mesalt Medihoney and gauze to all areas. Start clindamycin 1 tablet 4 times a day for 10 days. Return in 1 week for follow-up  ?  9.?DM (diabetes mellitus)?E11.9  Ordered:  clindamycin, 300 mg = 1 cap(s), Oral, q6hr, X 10 day(s), # 40 cap(s), 0 Refill(s), Pharmacy: [facility], 172.7, cm, Height/Length Dosing, 05/19/20 14:26:00 CDT, 102, kg, Weight Dosing, 05/19/20 14:26:00 CDT  CBC w/ Auto Diff, Routine collect, 04/19/22 12:11:00 CDT, Blood, Order for future visit, Stop date 04/19/22 12:11:00 CDT, Lab Collect, Skin tear of left elbow without complication  Pressure ulcer of right leg, unstageable  Pressure ulcer of right heel, stage 3  Toe...  Tissue Culture - Aerobic, Routine collect, 04/19/22 12:11:00 CDT, Order for future visit, Tissue, Calf, Nurse collect, Stop date 04/19/22 12:11:00 CDT, Skin tear of left elbow without complication  Pressure ulcer of right leg, unstageable  Pressure ulcer of right heel, stage...  Wound Care Outpatient, *Est. 04/26/22 3:00:00 CDT, *Est. Stop date 04/26/22 3:00:00 CDT, [facility], FU with Physician in 1 week  Wound Care Team Treatment, 04/19/22 12:11:00 CDT, Stop date 04/19/22 12:11:00 CDT, Wounds are to be cleaned every day. Apply Mesalt Medihoney and gauze to all areas. Start clindamycin 1 tablet 4 times a day for 10 days. Return in 1 week for follow-up  ?   Problem List/Past Medical History  Ongoing  Chronic skin ulcer  DM (diabetes mellitus)  Infected decubitus ulcer  Lymphedema of leg  Neurogenic bladder  Obesity  Open wound of abdomen  Pressure ulcer of heel  Pressure ulcer of right ischium  Quadriplegia  Quadriplegic spinal paralysis  Skin eschar  Historical  Pressure ulcer of right foot stage 3  Medications  Augmentin 500 mg-125 mg oral tablet, 1 tab(s), Oral, TID  Bactroban 2% topical ointment, 1 aruna, TOP, TID,?  ?Not Taking, Completed Rx  BASAGLAR INJ 100UNIT  Bydureon BCise 2 mg/0.85 mL subcutaneous suspension, extended release,? ?Not Taking per Prescriber  Ciprofloxacin Hcl 500 Mg Tab, 500 mg= 1 tab(s), BID,? ?Not Taking, Completed Rx  clindamycin 150 mg oral capsule, 300 mg= 2 cap(s), Oral, q6hr,? ?Not Taking, Completed Rx  clindamycin 300 mg oral capsule, 300 mg= 1 cap(s), Oral, q6hr  Eliquis Starter Pack for Treatment of DVT and PE 5 mg oral tablet, 5 mg= 1 tab(s), Oral, BID  gentamicin 0.1% topical ointment, 1 aruna, TOP, Daily,? ?Not Taking, Completed Rx  gentamicin 0.1% topical ointment, 1 aruna, TOP, Daily, 2 refills  GLIPIZIDE TAB 10MG, 10 mg= 1 tab(s), Oral, BID,? ?Not Taking per Prescriber  JANUVIA TAB 100MG, 100 mg= 1 tab(s), Oral, Daily,? ?Not Taking per Prescriber  Januvia 50 mg oral tablet, 50 mg= 1 tab(s), Oral, Daily  Ketoconazole 2% Shampoo,? ?Not Taking, Completed Rx  Levofloxacin 500 Mg Tablet, 500 mg= 1 tab(s), Oral, Daily,? ?Not Taking, Completed Rx  metoprolol succinate 50 mg oral tablet, extended release, 50 mg= 1 tab(s), Oral, BID  MUPIROCIN OIN 2%,? ?Not taking  MYRBETRIQ TAB 50MG, 50 mg= 1 tab(s), Oral, Daily  OXYBUTYNIN TAB 5MG,? ?Not Taking per Prescriber  ReliOn/NovoLIN R 100 units/mL injectable solution  sulfamethoxazole-trimethoprim 800 mg-160 mg oral tablet, 1 tab(s), Oral, BID,? ?Not Taking, Completed Rx  Tramadol Hcl Tab 50 Mg, 50 mg= 1 tab(s), Oral, q4-6hr,? ?Not Taking, Completed Rx  Allergies  No Known Allergies  Social History  Abuse/Neglect  No, 05/19/2020  Tobacco  Never (less than 100 in lifetime), N/A, 05/19/2020  Never smoker, 05/01/2017  Health Maintenance  Health Maintenance  ???Pending?(in the next year)  ??? ??OverDue  ??? ? ? ?ADL Screening due??05/19/21??and every 1??year(s)  ??? ? ? ?Obesity Screening due??01/01/22??and every 1??year(s)  ??? ? ? ?Alcohol Misuse Screening due??01/02/22??and every 1??year(s)  ??? ??Due?  ??? ? ? ?Aspirin Therapy for CVD Prevention  due??04/19/22??and every 1??year(s)  ??? ? ? ?Medicare Annual Wellness Exam due??04/19/22??and every 1??year(s)  ??? ? ? ?Tetanus Vaccine due??04/19/22??and every 10??year(s)  ???Satisfied?(in the past 1 year)  ??? ??Satisfied?  ??? ? ? ?Blood Pressure Screening on??04/19/22.??Satisfied by Junior MADDOX, Nguyen Nunn  ?

## 2022-05-20 NOTE — HISTORICAL [facility] CERNER
This is a historical note converted from Becca. Formatting and pictures may have been removed.  Please reference Becca for original formatting and attached multimedia. Chief Complaint  new pressure injury to Right lateral thigh, noticed last thursday.  History of Present Illness  Incision/Wounds  ?1. Heel Right Pressure ulcer?- last charted: 03/22/2022 11:20  ?? ??Assessment Done By?- Wound Care Team  ?? ??Pressure Point?- Bony prominence  ?? ??Dressing Type?- ABD dressing pad, Gauze dressing, Honey, Medicated packing strips, Rolled Gauze, Tape, Tubular bandage  ?? ??Dressing Assessment?- Drainage present, Intact  ?? ??Dressing Activity?- Changed  ?? ??Cleansing?- Cleaned with normal saline  ?? ??Length?- 5.2 cm  ?? ??Width?- 3.2 cm  ?? ??Depth?- 0.1 cm  ?? ??Wound Bed Tissue Type?- Granulating  ?? ??Percent Granulated?- 100 %  ?? ??Pressure Ulcer Stage?- III  ?? ??Exudate Amount?- Moderate  ?? ??Exudate Type?- Serosanguineous  ?? ??Exudate Odor?- None  ?? ??Edge?- Well defined  ?? ??Surrounding Tissue Color?- Normal  ?? ??Surrounding Tissue Condition?- Callus, Intact  ?? ??Status?- Improving  ?  ?2. Ankle Right Anterior Pressure ulcer?- last charted: 03/22/2022 11:20  ?? ??Assessment Done By?- Wound Care Team  ?? ??Dressing Type?- Gauze dressing, Medicated packing strips  ?? ??Dressing Assessment?- Drainage present, Intact  ?? ??Dressing Activity?- Changed  ?? ??Cleansing?- Cleaned with normal saline  ?? ??Length?- 0.3 cm  ?? ??Width?- 0.5 cm  ?? ??Depth?- 0.1 cm  ?? ??Wound Bed Tissue Type?- Dry, Scab  ?? ??Pressure Ulcer Stage?- II  ?? ??Exudate Amount?- None  ?? ??Exudate Odor?- None  ?? ??Edge?- Well defined  ?? ??Surrounding Tissue Color?- Normal  ?? ??Surrounding Tissue Condition?- Edematous  ?? ??Status?- Improving  ?  ?3. Thigh Right Lateral Pressure ulcer?- last charted: 03/22/2022 11:20  ?? ??Assessment Done By?- Wound Care Team  ?? ??Abnormality Pattern?- Flat  ?? ??Abnormality Color?- Brown, Red,  Ryan  ?? ??Dressing Type?- Gauze dressing, Tape  ?? ??Dressing Assessment?- Drainage present, Intact  ?? ??Dressing Activity?- Changed  ?? ??Cleansing?- Cleaned with normal saline  ?? ??Length?- 2.0 cm  ?? ??Width?- 1.3 cm  ?? ??Depth?- 0.1 cm  ?? ??Wound Bed Tissue Type?- Granulating, Necrotic tissue, eschar  ?? ??Percent Necrotic Tissue Eschar?- 10 %  ?? ??Percent Other Tissue?- 90 %  ?? ??Pressure Ulcer Stage?- III  ?? ??Exudate Amount?- Small  ?? ??Exudate Type?- Serosanguineous  ?? ??Exudate Odor?- None  ?? ??Edge?- Well defined  ?? ??Surrounding Tissue Color?- Erythema  ?? ??Surrounding Tissue Condition?- Intact  ?? ??Status?- Improving  Patient returns today for reevaluation of his wounds.? Right heel ulcer?is improving rapidly.? Is now 5.2 cm x 3.2 cm with a depth of 0.1 cm.? This is 100% granulated today.? Right ankle?anterior pressure ulcer?is 0.3 x 0.5 cm with a depth of 0.1 cm?this is dry and scabby.? The skin is improving.? Right thigh lateral?pressure ulcer?is 90% granulated with some necrotic tissue and eschar of 10%.? Length of?2.0 cm and 1.3 cm.? Tissue is erythematous?around the area but is improving.? All areas are improving at this time.? There is?wound was cleaned with normal saline.? Right thigh wound?Medihoney Mesalt gauze and tape was applied.? Right heel?Mesalt gauze ABD pads and Kerlix.? Right ankle with a Band-Aid.? Tubigrip E?was applied to the right leg.? Wounds will be?cleaned daily with normal saline?and dressed as above.? Patient will return in 2 weeks for follow-up.  Physical Exam  Vitals & Measurements  T:?36.7? ?C (Oral)? HR:?130(Peripheral)? RR:?18? BP:?114/79? SpO2:?96%?  ?  Assessment/Plan  1.?Pressure ulcer of right leg, unstageable?L89.890  Ordered:  Wound Care Outpatient, *Est. 03/29/22 3:00:00 CDT, *Est. Stop date 03/29/22 3:00:00 CDT, [facility], FU with Physician in 2 weeks.  Wound Care Team Treatment, 03/22/22 11:44:00 CDT, Stop date 03/22/22 11:44:00 CDT, Clean  wounds daily with normal saline. To the right thigh apply Medihoney Mesalt gauze and tape. To the right heel Mesalt gauze ABD pads and Kerlix. Right ankle with Band-Aid and Tubigrip E to...  ?  2.?Pressure ulcer of right heel, stage 3?L89.613  Ordered:  Wound Care Outpatient, *Est. 03/29/22 3:00:00 CDT, *Est. Stop date 03/29/22 3:00:00 CDT, [facility], FU with Physician in 2 weeks.  Wound Care Team Treatment, 03/22/22 11:44:00 CDT, Stop date 03/22/22 11:44:00 CDT, Clean wounds daily with normal saline. To the right thigh apply Medihoney Mesalt gauze and tape. To the right heel Mesalt gauze ABD pads and Kerlix. Right ankle with Band-Aid and Tubigrip E to...  ?  3.?Toe ulcer?L97.509  Ordered:  Wound Care Outpatient, *Est. 03/29/22 3:00:00 CDT, *Est. Stop date 03/29/22 3:00:00 CDT, [facility], FU with Physician in 2 weeks.  Wound Care Team Treatment, 03/22/22 11:44:00 CDT, Stop date 03/22/22 11:44:00 CDT, Clean wounds daily with normal saline. To the right thigh apply Medihoney Mesalt gauze and tape. To the right heel Mesalt gauze ABD pads and Kerlix. Right ankle with Band-Aid and Tubigrip E to...  ?  4.?Lymphedema of leg?I89.0  Ordered:  Wound Care Outpatient, *Est. 03/29/22 3:00:00 CDT, *Est. Stop date 03/29/22 3:00:00 CDT, [facility], FU with Physician in 2 weeks.  Wound Care Team Treatment, 03/22/22 11:44:00 CDT, Stop date 03/22/22 11:44:00 CDT, Clean wounds daily with normal saline. To the right thigh apply Medihoney Mesalt gauze and tape. To the right heel Mesalt gauze ABD pads and Kerlix. Right ankle with Band-Aid and Tubigrip E to...  ?  5.?Quadriplegia?G82.50  Ordered:  Wound Care Outpatient, *Est. 03/29/22 3:00:00 CDT, *Est. Stop date 03/29/22 3:00:00 CDT, [facility], FU with Physician in 2 weeks.  Wound Care Team Treatment, 03/22/22 11:44:00 CDT, Stop date 03/22/22 11:44:00 CDT, Clean wounds daily with normal saline. To the right thigh apply Medihoney Mesalt gauze and  tape. To the right heel Mesalt gauze ABD pads and Kerlix. Right ankle with Band-Aid and Tubigrip E to...  ?   Problem List/Past Medical History  Ongoing  Chronic skin ulcer  DM (diabetes mellitus)  Infected decubitus ulcer  Lymphedema of leg  Neurogenic bladder  Obesity  Open wound of abdomen  Pressure ulcer of heel  Pressure ulcer of right ischium  Quadriplegia  Quadriplegic spinal paralysis  Skin eschar  Historical  Pressure ulcer of right foot stage 3  Medications  Augmentin 500 mg-125 mg oral tablet, 1 tab(s), Oral, TID  Bactroban 2% topical ointment, 1 aruna, TOP, TID,? ?Not Taking, Completed Rx  BASAGLAR INJ 100UNIT  Bydureon BCise 2 mg/0.85 mL subcutaneous suspension, extended release,? ?Not Taking per Prescriber  Ciprofloxacin Hcl 500 Mg Tab, 500 mg= 1 tab(s), BID,? ?Not Taking, Completed Rx  clindamycin 150 mg oral capsule, 300 mg= 2 cap(s), Oral, q6hr,? ?Not Taking, Completed Rx  Eliquis Starter Pack for Treatment of DVT and PE 5 mg oral tablet, 5 mg= 1 tab(s), Oral, BID  gentamicin 0.1% topical ointment, 1 aruna, TOP, Daily,? ?Not Taking, Completed Rx  gentamicin 0.1% topical ointment, 1 aruna, TOP, Daily, 2 refills  GLIPIZIDE TAB 10MG, 10 mg= 1 tab(s), Oral, BID,? ?Not Taking per Prescriber  JANUVIA TAB 100MG, 100 mg= 1 tab(s), Oral, Daily,? ?Not Taking per Prescriber  Januvia 50 mg oral tablet, 50 mg= 1 tab(s), Oral, Daily  Ketoconazole 2% Shampoo,? ?Not Taking, Completed Rx  Levofloxacin 500 Mg Tablet, 500 mg= 1 tab(s), Oral, Daily,? ?Not Taking, Completed Rx  metoprolol succinate 50 mg oral tablet, extended release, 50 mg= 1 tab(s), Oral, BID  MUPIROCIN OIN 2%,? ?Not taking  MYRBETRIQ TAB 50MG, 50 mg= 1 tab(s), Oral, Daily  OXYBUTYNIN TAB 5MG,? ?Not Taking per Prescriber  ReliOn/NovoLIN R 100 units/mL injectable solution  sulfamethoxazole-trimethoprim 800 mg-160 mg oral tablet, 1 tab(s), Oral, BID,? ?Not Taking, Completed Rx  Tramadol Hcl Tab 50 Mg, 50 mg= 1 tab(s), Oral, q4-6hr,? ?Not Taking, Completed  Rx  Allergies  No Known Allergies  Social History  Abuse/Neglect  No, 05/19/2020  Tobacco  Never (less than 100 in lifetime), N/A, 05/19/2020  Never smoker, 05/01/2017  Health Maintenance  Health Maintenance  ???Pending?(in the next year)  ??? ??OverDue  ??? ? ? ?ADL Screening due??05/19/21??and every 1??year(s)  ??? ? ? ?Obesity Screening due??01/01/22??and every 1??year(s)  ??? ? ? ?Alcohol Misuse Screening due??01/02/22??and every 1??year(s)  ??? ??Due?  ??? ? ? ?Aspirin Therapy for CVD Prevention due??03/22/22??and every 1??year(s)  ??? ? ? ?Medicare Annual Wellness Exam due??03/22/22??and every 1??year(s)  ??? ? ? ?Tetanus Vaccine due??03/22/22??and every 10??year(s)  ???Satisfied?(in the past 1 year)  ??? ??Satisfied?  ??? ? ? ?Blood Pressure Screening on??03/22/22.??Satisfied by Sally MADDOX, Ca Sánchez  ?

## 2022-05-20 NOTE — HISTORICAL [facility] CERNER
This is a historical note converted from Becca. Formatting and pictures may have been removed.  Please reference Becca for original formatting and attached multimedia. Chief Complaint  new pressure injury to Right lateral thigh, noticed last thursday.  History of Present Illness  Incision/Wounds  ?1. Heel Right Pressure ulcer?- last charted: 02/22/2022 11:34  ?? ??Assessment Done By?- Wound Care Team  ?? ??Pressure Point?- Bony prominence  ?? ??Dressing Type?- ABD dressing pad, Collagen, Gauze dressing, Rolled Gauze, Tape, Tubular bandage  ?? ??Dressing Assessment?- Drainage present, Intact  ?? ??Dressing Activity?- Changed  ?? ??Cleansing?- Cleaned with normal saline  ?? ??Length?- 3.3 cm  ?? ??Width?- 1.7 cm  ?? ??Depth?- 0.1 cm  ?? ??Wound Bed Tissue Type?- Fibrotic, Granulating  ?? ??Percent Granulated?- 100 %  ?? ??Pressure Ulcer Stage?- III  ?? ??Exudate Amount?- Moderate  ?? ??Exudate Type?- Serosanguineous  ?? ??Exudate Odor?- None  ?? ??Edge?- Well defined  ?? ??Surrounding Tissue Color?- Normal  ?? ??Surrounding Tissue Condition?- Callus, Intact  ?? ??Status?- Improving  ?  ?2. Ankle Right Anterior Pressure ulcer?- last charted: 02/22/2022 11:34  ?? ??Assessment Done By?- Wound Care Team  ?? ??Dressing Type?- None  ?? ??Dressing Assessment?- Drainage present, Intact  ?? ??Dressing Activity?- Changed  ?? ??Cleansing?- Cleaned with normal saline  ?? ??Length?- 0.6 cm  ?? ??Width?- 0.2 cm  ?? ??Depth?- 0.1 cm  ?? ??Wound Bed Tissue Type?- Granulating  ?? ??Pressure Ulcer Stage?- II  ?? ??Exudate Amount?- Scant  ?? ??Exudate Type?- Sanguineous  ?? ??Exudate Odor?- None  ?? ??Edge?- Well defined  ?? ??Surrounding Tissue Color?- Normal  ?? ??Surrounding Tissue Condition?- Edematous  ?? ??Status?- Improving  ?? ??Topical Agent Application?- Ointment  ?  ?3. Toe Left Lateral, Other: L Great Toe Trauma?- last charted: 02/22/2022 11:34  ?? ??Assessment Done By?- Wound Care Team  ?? ??Abnormality Pattern?- Flat  ??  ??Dressing Type?- Gauze dressing, Tape  ?? ??Dressing Assessment?- Dry  ?? ??Dressing Activity?- Changed  ?? ??Cleansing?- Cleaned with normal saline  ?? ??Length?- 0 cm  ?? ??Width?- 0 cm  ?? ??Depth?- 0 cm  ?? ??Wound Bed Tissue Type?- Scab  ?? ??Exudate Amount?- None  ?? ??Exudate Odor?- None  ?? ??Edge?- Well defined  ?? ??Surrounding Tissue Color?- Normal  ?? ??Surrounding Tissue Condition?- Dry, Edematous  ?? ??Status?- Healed  ?  ?4. Thigh Right Lateral Pressure ulcer?- last charted: 02/22/2022 11:34  ?? ??Assessment Done By?- Wound Care Team  ?? ??Abnormality Pattern?- Flat  ?? ??Abnormality Color?- Black, Pink, Tan, White  ?? ??Dressing Type?- Gauze dressing, Honey, Tape  ?? ??Dressing Assessment?- Drainage present, Intact  ?? ??Dressing Activity?- Removed  ?? ??Cleansing?- Cleaned with normal saline  ?? ??Length?- 2.7 cm  ?? ??Width?- 1.5 cm  ?? ??Depth?- 0.1 cm  ?? ??Wound Bed Tissue Type?- Granulating, Necrotic tissue, slough, Pale Pink  ?? ??Exudate Amount?- Small  ?? ??Exudate Type?- Serous  ?? ??Exudate Odor?- None  ?? ??Edge?- Well defined  ?? ??Surrounding Tissue Color?- Erythema  ?? ??Surrounding Tissue Condition?- Intact  Today left?left toe ulcers or?completely healed.? Right anterior ankle pressure ulcer?was cleaned with normal saline length 0.6 and width 0.2.? This is granulated.? And improving.? Right heel pressure ulcer?was cleaned?and?length of 3.3 cm with a width of 1.7 cm.? This is granulated 100%.? The right heel and ankle?areas were cleaned with normal saline?and collagen with silver gauze ABD pads and Kerlix was applied.? Patient will continue to do this every other day.? Right lateral thigh with brand-new?ulceration.? This appears to be?a?pressure ulcer.? Area was cleaned?and?Medihoney Mesalt and cover dressing was applied.? This will be changed?daily by the patient.? Patient will return?in 2 weeks for follow-up.  Physical Exam  Vitals & Measurements  T:?36.8? ?C (Oral)?  HR:?82(Peripheral)? RR:?18? BP:?84/63? SpO2:?99%?  ?  Assessment/Plan  1.?Pressure ulcer of right leg, unstageable?L89.890  Ordered:  Wound Care Outpatient, *Est. 03/01/22 3:00:00 CST, *Est. Stop date 03/01/22 3:00:00 CST, [facility], FU with Physician in 2 weeks  Wound Care Team Treatment, 02/22/22 14:30:00 CST, Stop date 02/22/22 14:30:00 CST, Dress new wound on right thigh daily with Mesalt Medihoney and cover dressing. Right heel collagen with silver gauze ABD pad Kerlix every other day. Patient is return in 2 weeks.  ?  2.?Pressure ulcer of right heel, stage 3?L89.613  Ordered:  Wound Care Outpatient, *Est. 03/01/22 3:00:00 CST, *Est. Stop date 03/01/22 3:00:00 CST, [facility], FU with Physician in 2 weeks  Wound Care Team Treatment, 02/22/22 14:30:00 CST, Stop date 02/22/22 14:30:00 CST, Dress new wound on right thigh daily with Mesalt Medihoney and cover dressing. Right heel collagen with silver gauze ABD pad Kerlix every other day. Patient is return in 2 weeks.  ?  3.?Toe ulcer?L97.509  Ordered:  Wound Care Outpatient, *Est. 03/01/22 3:00:00 CST, *Est. Stop date 03/01/22 3:00:00 CST, [facility], FU with Physician in 2 weeks  Wound Care Team Treatment, 02/22/22 14:30:00 CST, Stop date 02/22/22 14:30:00 CST, Dress new wound on right thigh daily with Mesalt Medihoney and cover dressing. Right heel collagen with silver gauze ABD pad Kerlix every other day. Patient is return in 2 weeks.  ?  4.?Lymphedema of leg?I89.0  Ordered:  Wound Care Outpatient, *Est. 03/01/22 3:00:00 CST, *Est. Stop date 03/01/22 3:00:00 CST, [facility], FU with Physician in 2 weeks  Wound Care Team Treatment, 02/22/22 14:30:00 CST, Stop date 02/22/22 14:30:00 CST, Dress new wound on right thigh daily with Mesalt Medihoney and cover dressing. Right heel collagen with silver gauze ABD pad Kerlix every other day. Patient is return in 2 weeks.  ?  5.?Quadriplegia?G82.50  Ordered:  Wound Care Outpatient,  *Est. 03/01/22 3:00:00 CST, *Est. Stop date 03/01/22 3:00:00 CST, [facility], FU with Physician in 2 weeks  Wound Care Team Treatment, 02/22/22 14:30:00 CST, Stop date 02/22/22 14:30:00 CST, Dress new wound on right thigh daily with Mesalt Medihoney and cover dressing. Right heel collagen with silver gauze ABD pad Kerlix every other day. Patient is return in 2 weeks.  ?   Problem List/Past Medical History  Ongoing  Chronic skin ulcer  DM (diabetes mellitus)  Infected decubitus ulcer  Lymphedema of leg  Neurogenic bladder  Obesity  Open wound of abdomen  Pressure ulcer of heel  Pressure ulcer of right ischium  Quadriplegia  Quadriplegic spinal paralysis  Skin eschar  Historical  Pressure ulcer of right foot stage 3  Medications  Augmentin 500 mg-125 mg oral tablet, 1 tab(s), Oral, TID  Bactroban 2% topical ointment, 1 aruna, TOP, TID,? ?Not Taking, Completed Rx  BASAGLAR INJ 100UNIT  Bydureon BCise 2 mg/0.85 mL subcutaneous suspension, extended release,? ?Not Taking per Prescriber  Ciprofloxacin Hcl 500 Mg Tab, 500 mg= 1 tab(s), BID,? ?Not Taking, Completed Rx  clindamycin 150 mg oral capsule, 300 mg= 2 cap(s), Oral, q6hr,? ?Not Taking, Completed Rx  Eliquis Starter Pack for Treatment of DVT and PE 5 mg oral tablet, 5 mg= 1 tab(s), Oral, BID  gentamicin 0.1% topical ointment, 1 aruna, TOP, Daily,? ?Not Taking, Completed Rx  gentamicin 0.1% topical ointment, 1 aruna, TOP, Daily, 2 refills  GLIPIZIDE TAB 10MG, 10 mg= 1 tab(s), Oral, BID,? ?Not Taking per Prescriber  JANUVIA TAB 100MG, 100 mg= 1 tab(s), Oral, Daily,? ?Not Taking per Prescriber  Januvia 50 mg oral tablet, 50 mg= 1 tab(s), Oral, Daily  Ketoconazole 2% Shampoo,? ?Not Taking, Completed Rx  Levofloxacin 500 Mg Tablet, 500 mg= 1 tab(s), Oral, Daily,? ?Not Taking, Completed Rx  metoprolol succinate 50 mg oral tablet, extended release, 50 mg= 1 tab(s), Oral, BID  MUPIROCIN OIN 2%,? ?Not taking  MYRBETRIQ TAB 50MG, 50 mg= 1 tab(s), Oral, Daily  OXYBUTYNIN TAB  5MG,? ?Not Taking per Prescriber  ReliOn/NovoLIN R 100 units/mL injectable solution  sulfamethoxazole-trimethoprim 800 mg-160 mg oral tablet, 1 tab(s), Oral, BID,? ?Not Taking, Completed Rx  Tramadol Hcl Tab 50 Mg, 50 mg= 1 tab(s), Oral, q4-6hr,? ?Not Taking, Completed Rx  Allergies  No Known Allergies  Social History  Abuse/Neglect  No, 05/19/2020  Tobacco  Never (less than 100 in lifetime), N/A, 05/19/2020  Never smoker, 05/01/2017  Health Maintenance  Health Maintenance  ???Pending?(in the next year)  ??? ??OverDue  ??? ? ? ?ADL Screening due??05/19/21??and every 1??year(s)  ??? ??Due?  ??? ? ? ?Obesity Screening due??01/01/22??and every 1??year(s)  ??? ? ? ?Alcohol Misuse Screening due??01/02/22??and every 1??year(s)  ??? ? ? ?Aspirin Therapy for CVD Prevention due??02/22/22??and every 1??year(s)  ??? ? ? ?Medicare Annual Wellness Exam due??02/22/22??and every 1??year(s)  ??? ? ? ?Tetanus Vaccine due??02/22/22??and every 10??year(s)  ???Satisfied?(in the past 1 year)  ??? ??Satisfied?  ??? ? ? ?Blood Pressure Screening on??02/22/22.??Satisfied by Junior MADDOX, Nguyen Nunn  ?

## 2022-05-20 NOTE — HISTORICAL [facility] CERNER
This is a historical note converted from Becca. Formatting and pictures may have been removed.  Please reference Becca for original formatting and attached multimedia. Chief Complaint  new pressure injury to Right lateral thigh, noticed last thursday.  History of Present Illness  Incision/Wounds  ?1. Heel Right Pressure ulcer?- last charted: 03/15/2022 10:43  ?? ??Assessment Done By?- Wound Care Team  ?? ??Pressure Point?- Bony prominence  ?? ??Dressing Type?- ABD dressing pad, Collagen, Gauze dressing, Rolled Gauze, Tape, Tubular bandage  ?? ??Dressing Assessment?- Drainage present, Intact  ?? ??Dressing Activity?- Changed  ?? ??Cleansing?- Cleaned with normal saline  ?? ??Length?- 6.0 cm  ?? ??Width?- 3.0 cm  ?? ??Depth?- 0.1 cm  ?? ??Wound Bed Tissue Type?- Friable, Granulating  ?? ??Percent Granulated?- 100 %  ?? ??Pressure Ulcer Stage?- III  ?? ??Exudate Amount?- Moderate  ?? ??Exudate Type?- Serosanguineous  ?? ??Exudate Odor?- None  ?? ??Edge?- Well defined  ?? ??Surrounding Tissue Color?- Normal  ?? ??Surrounding Tissue Condition?- Callus, Intact  ?? ??Status?- Deteriorating  ?  ?2. Ankle Right Anterior Pressure ulcer?- last charted: 03/15/2022 10:43  ?? ??Assessment Done By?- Wound Care Team  ?? ??Dressing Type?- Collagen, Gauze dressing  ?? ??Dressing Assessment?- Drainage present, Intact  ?? ??Dressing Activity?- Changed  ?? ??Cleansing?- Cleaned with normal saline  ?? ??Length?- 0.6 cm  ?? ??Width?- 0.4 cm  ?? ??Depth?- 0.2 cm  ?? ??Wound Bed Tissue Type?- Granulating  ?? ??Pressure Ulcer Stage?- II  ?? ??Exudate Amount?- Scant  ?? ??Exudate Type?- Serosanguineous  ?? ??Exudate Odor?- None  ?? ??Edge?- Well defined  ?? ??Surrounding Tissue Color?- Normal  ?? ??Surrounding Tissue Condition?- Edematous  ?? ??Status?- Improving  ?  ?3. Thigh Right Lateral Pressure ulcer?- last charted: 03/15/2022 10:43  ?? ??Assessment Done By?- Wound Care Team  ?? ??Abnormality Pattern?- Flat  ?? ??Abnormality Color?-  Brown, Red, Tan  ?? ??Dressing Type?- Gauze dressing, Tape  ?? ??Dressing Assessment?- Dry, Intact  ?? ??Dressing Activity?- Removed  ?? ??Cleansing?- Cleaned with normal saline  ?? ??Length?- 2.0 cm  ?? ??Width?- 1.0 cm  ?? ??Depth?- 0.1 cm  ?? ??Wound Bed Tissue Type?- Granulating, Necrotic tissue, eschar  ?? ??Percent Necrotic Tissue Eschar?- 90 %  ?? ??Percent Other Tissue?- 10 %  ?? ??Pressure Ulcer Stage?- Unstageable  ?? ??Exudate Amount?- None  ?? ??Exudate Odor?- None  ?? ??Edge?- Well defined  ?? ??Surrounding Tissue Color?- Erythema  ?? ??Surrounding Tissue Condition?- Intact  Today right heel pressure ulcer?shows some enlargement with a length of 6.0 cm?and a width of 3.0 cm.? Tissue is still granulating?but?but somewhat friable..? Today the ankle?right anterior pressure ulcer?is somewhat smaller and improving. ?Is totally granulated.? The right thigh pressure ulcer?again is granulating but has necrotic tissue and eschar.? Surrounding tissue is intact.? Today all areas were cleaned and the heel?and anterior ankle?wounds?were?dressed with Mesalt.? Also Tubigrip E. ?Right thigh?was dressed with Medihoney Mesalt and this will be done daily also.? Patient will continue to offload is much as possible?and is to return in 1 week.  Physical Exam  Vitals & Measurements  T:?37.1? ?C (Oral)? HR:?95(Peripheral)? RR:?18? BP:?85/63? SpO2:?99%?  ?  Assessment/Plan  1.?Pressure ulcer of right leg, unstageable?L89.890  Ordered:  Wound Care Outpatient, *Est. 03/22/22 3:00:00 CDT, *Est. Stop date 03/22/22 3:00:00 CDT, [facility], FU with Physician in 1 week  Wound Care Team Treatment, 03/15/22 13:54:00 CDT, Stop date 03/15/22 13:54:00 CDT, Daily cleaning with saline to all wounds. Dress the wound with Mesalt and ABD pads daily. Also use Tubigrip E over the area. Right thigh with Mesalt and Medihoney daily. MD visit in 1 week.  ?  2.?Pressure ulcer of right heel, stage 3?L89.613  Ordered:  Wound Care Outpatient,  *Est. 03/22/22 3:00:00 CDT, *Est. Stop date 03/22/22 3:00:00 CDT, [facility], FU with Physician in 1 week  Wound Care Team Treatment, 03/15/22 13:54:00 CDT, Stop date 03/15/22 13:54:00 CDT, Daily cleaning with saline to all wounds. Dress the wound with Mesalt and ABD pads daily. Also use Tubigrip E over the area. Right thigh with Mesalt and Medihoney daily. MD visit in 1 week.  ?  3.?Toe ulcer?L97.509  Ordered:  Wound Care Outpatient, *Est. 03/22/22 3:00:00 CDT, *Est. Stop date 03/22/22 3:00:00 CDT, [facility], FU with Physician in 1 week  Wound Care Team Treatment, 03/15/22 13:54:00 CDT, Stop date 03/15/22 13:54:00 CDT, Daily cleaning with saline to all wounds. Dress the wound with Mesalt and ABD pads daily. Also use Tubigrip E over the area. Right thigh with Mesalt and Medihoney daily. MD visit in 1 week.  ?  4.?Lymphedema of leg?I89.0  Ordered:  Wound Care Outpatient, *Est. 03/22/22 3:00:00 CDT, *Est. Stop date 03/22/22 3:00:00 CDT, [facility], FU with Physician in 1 week  Wound Care Team Treatment, 03/15/22 13:54:00 CDT, Stop date 03/15/22 13:54:00 CDT, Daily cleaning with saline to all wounds. Dress the wound with Mesalt and ABD pads daily. Also use Tubigrip E over the area. Right thigh with Mesalt and Medihoney daily. MD visit in 1 week.  ?  5.?Quadriplegia?G82.50  Ordered:  Wound Care Outpatient, *Est. 03/22/22 3:00:00 CDT, *Est. Stop date 03/22/22 3:00:00 CDT, [facility], FU with Physician in 1 week  Wound Care Team Treatment, 03/15/22 13:54:00 CDT, Stop date 03/15/22 13:54:00 CDT, Daily cleaning with saline to all wounds. Dress the wound with Mesalt and ABD pads daily. Also use Tubigrip E over the area. Right thigh with Mesalt and Medihoney daily. MD visit in 1 week.  ?   Problem List/Past Medical History  Ongoing  Chronic skin ulcer  DM (diabetes mellitus)  Infected decubitus ulcer  Lymphedema of leg  Neurogenic bladder  Obesity  Open wound of abdomen  Pressure ulcer of  heel  Pressure ulcer of right ischium  Quadriplegia  Quadriplegic spinal paralysis  Skin eschar  Historical  Pressure ulcer of right foot stage 3  Medications  Augmentin 500 mg-125 mg oral tablet, 1 tab(s), Oral, TID  Bactroban 2% topical ointment, 1 aruna, TOP, TID,? ?Not Taking, Completed Rx  BASAGLAR INJ 100UNIT  Bydureon BCise 2 mg/0.85 mL subcutaneous suspension, extended release,? ?Not Taking per Prescriber  Ciprofloxacin Hcl 500 Mg Tab, 500 mg= 1 tab(s), BID,? ?Not Taking, Completed Rx  clindamycin 150 mg oral capsule, 300 mg= 2 cap(s), Oral, q6hr,? ?Not Taking, Completed Rx  Eliquis Starter Pack for Treatment of DVT and PE 5 mg oral tablet, 5 mg= 1 tab(s), Oral, BID  gentamicin 0.1% topical ointment, 1 aruna, TOP, Daily,? ?Not Taking, Completed Rx  gentamicin 0.1% topical ointment, 1 aruna, TOP, Daily, 2 refills  GLIPIZIDE TAB 10MG, 10 mg= 1 tab(s), Oral, BID,? ?Not Taking per Prescriber  JANUVIA TAB 100MG, 100 mg= 1 tab(s), Oral, Daily,? ?Not Taking per Prescriber  Januvia 50 mg oral tablet, 50 mg= 1 tab(s), Oral, Daily  Ketoconazole 2% Shampoo,? ?Not Taking, Completed Rx  Levofloxacin 500 Mg Tablet, 500 mg= 1 tab(s), Oral, Daily,? ?Not Taking, Completed Rx  metoprolol succinate 50 mg oral tablet, extended release, 50 mg= 1 tab(s), Oral, BID  MUPIROCIN OIN 2%,? ?Not taking  MYRBETRIQ TAB 50MG, 50 mg= 1 tab(s), Oral, Daily  OXYBUTYNIN TAB 5MG,? ?Not Taking per Prescriber  ReliOn/NovoLIN R 100 units/mL injectable solution  sulfamethoxazole-trimethoprim 800 mg-160 mg oral tablet, 1 tab(s), Oral, BID,? ?Not Taking, Completed Rx  Tramadol Hcl Tab 50 Mg, 50 mg= 1 tab(s), Oral, q4-6hr,? ?Not Taking, Completed Rx  Allergies  No Known Allergies  Social History  Abuse/Neglect  No, 05/19/2020  Tobacco  Never (less than 100 in lifetime), N/A, 05/19/2020  Never smoker, 05/01/2017  Health Maintenance  Health Maintenance  ???Pending?(in the next year)  ??? ??OverDue  ??? ? ? ?ADL Screening due??05/19/21??and every  1??year(s)  ??? ? ? ?Obesity Screening due??01/01/22??and every 1??year(s)  ??? ? ? ?Alcohol Misuse Screening due??01/02/22??and every 1??year(s)  ??? ??Due?  ??? ? ? ?Aspirin Therapy for CVD Prevention due??03/15/22??and every 1??year(s)  ??? ? ? ?Medicare Annual Wellness Exam due??03/15/22??and every 1??year(s)  ??? ? ? ?Tetanus Vaccine due??03/15/22??and every 10??year(s)  ???Satisfied?(in the past 1 year)  ??? ??Satisfied?  ??? ? ? ?Blood Pressure Screening on??03/15/22.??Satisfied by Junior MADDOX, Nguyen Nunn  ?

## 2022-05-20 NOTE — HISTORICAL [facility] CERNER
This is a historical note converted from Becca. Formatting and pictures may have been removed.  Please reference Becca for original formatting and attached multimedia. History of Present Illness  Incision/Wounds  ?1. Heel Right Pressure ulcer?- last charted: 02/01/2022 10:46  ?? ??Assessment Done By?- Wound Care Team  ?? ??Pressure Point?- Bony prominence  ?? ??Dressing Type?- ABD dressing pad, Collagen, Gauze dressing, Rolled Gauze, Tape, Tubular bandage  ?? ??Dressing Assessment?- Drainage present, Intact  ?? ??Dressing Activity?- Changed  ?? ??Cleansing?- Cleaned with normal saline  ?? ??Length?- 3.9 cm  ?? ??Width?- 2.6 cm  ?? ??Depth?- 0.1 cm  ?? ??Wound Bed Tissue Type?- Fibrotic, Granulating  ?? ??Percent Granulated?- 100 %  ?? ??Pressure Ulcer Stage?- III  ?? ??Exudate Amount?- Moderate  ?? ??Exudate Type?- Serosanguineous  ?? ??Exudate Odor?- None  ?? ??Edge?- Well defined  ?? ??Surrounding Tissue Color?- Normal  ?? ??Surrounding Tissue Condition?- Callus, Intact  ?? ??Status?- Improving  ?  ?2. Ankle Right Anterior Pressure ulcer?- last charted: 02/01/2022 10:46  ?? ??Assessment Done By?- Wound Care Team  ?? ??Dressing Type?- None  ?? ??Dressing Assessment?- Drainage present, Intact  ?? ??Dressing Activity?- Changed  ?? ??Cleansing?- Cleaned with normal saline  ?? ??Length?- 0.7 cm  ?? ??Width?- 0.2 cm  ?? ??Wound Bed Tissue Type?- Ecchymotic, Epithelialized  ?? ??Pressure Ulcer Stage?- II  ?? ??Exudate Amount?- None  ?? ??Exudate Odor?- None  ?? ??Edge?- Well defined  ?? ??Surrounding Tissue Color?- Normal  ?? ??Surrounding Tissue Condition?- Edematous  ?? ??Status?- Improving  ?? ??Topical Agent Application?- Ointment  ?  ?3. Toe Left Lateral, Other: L Great Toe Trauma?- last charted: 02/01/2022 10:46  ?? ??Assessment Done By?- Wound Care Team  ?? ??Abnormality Pattern?- Flat  ?? ??Abnormality Color?- Pink, Red  ?? ??Dressing Type?- Gauze dressing, Medicated packing strips, Tape  ?? ??Dressing  Assessment?- Drainage present, Intact  ?? ??Dressing Activity?- Changed  ?? ??Cleansing?- Cleaned with normal saline  ?? ??Length?- 0.2 cm  ?? ??Width?- 0.3 cm  ?? ??Depth?- 0.1 cm  ?? ??Wound Bed Tissue Type?- Granulating  ?? ??Exudate Amount?- Scant  ?? ??Exudate Type?- Serosanguineous  ?? ??Exudate Odor?- None  ?? ??Edge?- Well defined  ?? ??Surrounding Tissue Color?- Normal  ?? ??Surrounding Tissue Condition?- Dry, Edematous  ?? ??Status?- Improving  ?? ??Topical Agent Application?- Ointment  ?  ?4. Toe Left Lateral, Other: 2nd Toe Trauma?- last charted: 02/01/2022 10:46  ?? ??Assessment Done By?- Wound Care Team  ?? ??Dressing Activity?- Changed  ?? ??Cleansing?- Cleaned with normal saline  ?? ??Length?- 0.2 cm  ?? ??Width?- 0.2 cm  ?? ??Wound Bed Tissue Type?- Scab  ?? ??Exudate Amount?- None  ?? ??Exudate Odor?- None  ?? ??Edge?- Well defined  ?? ??Surrounding Tissue Color?- Normal  ?? ??Surrounding Tissue Condition?- Intact  ?? ??Status?- Healed  Today right heel pressure ulcer?was cleaned and is?0.7 cm x 0.2 cm.? This is fibrotic and granulating well.? This is improving rapidly.? This wound was?cleaned and collagen with silver applied with bandage.? This will be changed every other day at home.? Other wounds are healed.? Patient?will keep these other?wounds cleaned and bandaged.? Patient will return in?2 weeks for follow-up.  Physical Exam  Vitals & Measurements  T:?36.9? ?C (Oral)? HR:?104(Peripheral)? RR:?18? BP:?139/95? SpO2:?99%?  ?  Assessment/Plan  1.?Pressure ulcer of right heel, stage 3?L89.613  Ordered:  Wound Care Outpatient, *Est. 02/08/22 3:00:00 CST, *Est. Stop date 02/08/22 3:00:00 CST, [facility], FU with Physician in 2weeks  Wound Care Team Treatment, 02/01/22 11:19:00 CST, Stop date 02/01/22 11:19:00 CST, Right heel with normal saline cleansing every other day and apply collagen with silver and bandage. Other wounds clean daily and apply Band-Aids. Physician visit in 2  weeks  ?  2.?Toe ulcer?L97.509  Ordered:  Wound Care Outpatient, *Est. 02/08/22 3:00:00 CST, *Est. Stop date 02/08/22 3:00:00 CST, [facility], FU with Physician in 2weeks  Wound Care Team Treatment, 02/01/22 11:19:00 CST, Stop date 02/01/22 11:19:00 CST, Right heel with normal saline cleansing every other day and apply collagen with silver and bandage. Other wounds clean daily and apply Band-Aids. Physician visit in 2 weeks  ?  3.?Lymphedema of leg?I89.0  Ordered:  Wound Care Outpatient, *Est. 02/08/22 3:00:00 CST, *Est. Stop date 02/08/22 3:00:00 CST, [facility], FU with Physician in 2weeks  Wound Care Team Treatment, 02/01/22 11:19:00 CST, Stop date 02/01/22 11:19:00 CST, Right heel with normal saline cleansing every other day and apply collagen with silver and bandage. Other wounds clean daily and apply Band-Aids. Physician visit in 2 weeks  ?  4.?Quadriplegia?G82.50  Ordered:  Wound Care Outpatient, *Est. 02/08/22 3:00:00 CST, *Est. Stop date 02/08/22 3:00:00 CST, [facility], FU with Physician in 2weeks  Wound Care Team Treatment, 02/01/22 11:19:00 CST, Stop date 02/01/22 11:19:00 CST, Right heel with normal saline cleansing every other day and apply collagen with silver and bandage. Other wounds clean daily and apply Band-Aids. Physician visit in 2 weeks  ?   Problem List/Past Medical History  Ongoing  Chronic skin ulcer  DM (diabetes mellitus)  Infected decubitus ulcer  Lymphedema of leg  Neurogenic bladder  Obesity  Open wound of abdomen  Pressure ulcer of heel  Pressure ulcer of right ischium  Quadriplegia  Quadriplegic spinal paralysis  Skin eschar  Historical  Pressure ulcer of right foot stage 3  Medications  Augmentin 500 mg-125 mg oral tablet, 1 tab(s), Oral, TID  Bactroban 2% topical ointment, 1 aruna, TOP, TID,? ?Not Taking, Completed Rx  BASAGLAR INJ 100UNIT  Bydureon BCise 2 mg/0.85 mL subcutaneous suspension, extended release,? ?Not Taking per Prescriber  Ciprofloxacin Hcl  500 Mg Tab, 500 mg= 1 tab(s), BID,? ?Not Taking, Completed Rx  clindamycin 150 mg oral capsule, 300 mg= 2 cap(s), Oral, q6hr,? ?Not Taking, Completed Rx  Eliquis Starter Pack for Treatment of DVT and PE 5 mg oral tablet, 5 mg= 1 tab(s), Oral, BID  gentamicin 0.1% topical ointment, 1 aruna, TOP, Daily,? ?Not Taking, Completed Rx  gentamicin 0.1% topical ointment, 1 aruna, TOP, Daily, 2 refills  GLIPIZIDE TAB 10MG, 10 mg= 1 tab(s), Oral, BID,? ?Not Taking per Prescriber  JANUVIA TAB 100MG, 100 mg= 1 tab(s), Oral, Daily,? ?Not Taking per Prescriber  Januvia 50 mg oral tablet, 50 mg= 1 tab(s), Oral, Daily  Ketoconazole 2% Shampoo,? ?Not Taking, Completed Rx  Levofloxacin 500 Mg Tablet, 500 mg= 1 tab(s), Oral, Daily,? ?Not Taking, Completed Rx  metoprolol succinate 50 mg oral tablet, extended release, 50 mg= 1 tab(s), Oral, BID  MUPIROCIN OIN 2%,? ?Not taking  MYRBETRIQ TAB 50MG, 50 mg= 1 tab(s), Oral, Daily  OXYBUTYNIN TAB 5MG,? ?Not Taking per Prescriber  ReliOn/NovoLIN R 100 units/mL injectable solution  sulfamethoxazole-trimethoprim 800 mg-160 mg oral tablet, 1 tab(s), Oral, BID,? ?Not Taking, Completed Rx  Tramadol Hcl Tab 50 Mg, 50 mg= 1 tab(s), Oral, q4-6hr,? ?Not Taking, Completed Rx  Allergies  No Known Allergies  Social History  Abuse/Neglect  No, 05/19/2020  Tobacco  Never (less than 100 in lifetime), N/A, 05/19/2020  Never smoker, 05/01/2017  Health Maintenance  Health Maintenance  ???Pending?(in the next year)  ??? ??OverDue  ??? ? ? ?ADL Screening due??05/19/21??and every 1??year(s)  ??? ??Due?  ??? ? ? ?Obesity Screening due??01/01/22??and every 1??year(s)  ??? ? ? ?Alcohol Misuse Screening due??01/02/22??and every 1??year(s)  ??? ? ? ?Aspirin Therapy for CVD Prevention due??02/01/22??and every 1??year(s)  ??? ? ? ?Medicare Annual Wellness Exam due??02/01/22??and every 1??year(s)  ??? ? ? ?Tetanus Vaccine due??02/01/22??and every 10??year(s)  ???Satisfied?(in the past 1 year)  ??? ??Satisfied?  ??? ? ? ?Blood  Pressure Screening on??02/01/22.??Satisfied by Junior MADDOX, Nguyen Nunn  ?

## 2022-05-20 NOTE — HISTORICAL [facility] CERNER
This is a historical note converted from Becca. Formatting and pictures may have been removed.  Please reference Becca for original formatting and attached multimedia. Chief Complaint  new pressure injury to Right lateral thigh, noticed last thursday.  History of Present Illness  Incision/Wounds  ?1. Heel Right Pressure ulcer?- last charted: 03/08/2022 11:18  ?? ??Assessment Done By?- Wound Care Team  ?? ??Pressure Point?- Bony prominence  ?? ??Dressing Type?- ABD dressing pad, Collagen, Gauze dressing, Rolled Gauze, Tape, Tubular bandage  ?? ??Dressing Assessment?- Drainage present, Intact  ?? ??Dressing Activity?- Changed  ?? ??Cleansing?- Cleaned with normal saline  ?? ??Length?- 3.4 cm  ?? ??Width?- 0.5 cm  ?? ??Depth?- 0.3 cm  ?? ??Wound Bed Tissue Type?- Friable, Granulating  ?? ??Percent Granulated?- 100 %  ?? ??Pressure Ulcer Stage?- III  ?? ??Exudate Amount?- Moderate  ?? ??Exudate Type?- Serosanguineous  ?? ??Exudate Odor?- None  ?? ??Edge?- Well defined  ?? ??Surrounding Tissue Color?- Normal  ?? ??Surrounding Tissue Condition?- Callus, Intact  ?? ??Status?- Improving  ?  ?2. Ankle Right Anterior Pressure ulcer?- last charted: 03/08/2022 11:18  ?? ??Assessment Done By?- Wound Care Team  ?? ??Dressing Type?- Collagen, Gauze dressing  ?? ??Dressing Assessment?- Drainage present, Intact  ?? ??Dressing Activity?- Changed  ?? ??Cleansing?- Cleaned with normal saline  ?? ??Length?- 0.7 cm  ?? ??Width?- 0.7 cm  ?? ??Depth?- 0.3 cm  ?? ??Wound Bed Tissue Type?- Granulating  ?? ??Pressure Ulcer Stage?- II  ?? ??Exudate Amount?- Small  ?? ??Exudate Type?- Serosanguineous  ?? ??Exudate Odor?- None  ?? ??Edge?- Well defined  ?? ??Surrounding Tissue Color?- Normal  ?? ??Surrounding Tissue Condition?- Edematous  ?? ??Status?- Improving  ?  ?3. Thigh Right Lateral Pressure ulcer?- last charted: 03/08/2022 11:18  ?? ??Assessment Done By?- Wound Care Team  ?? ??Abnormality Pattern?- Flat  ?? ??Abnormality Color?- Brown,  Pink, Tan, White  ?? ??Dressing Type?- Gauze dressing, Honey, Tape  ?? ??Dressing Assessment?- Drainage present, Intact  ?? ??Dressing Activity?- Changed  ?? ??Cleansing?- Cleaned with normal saline  ?? ??Length?- 2.1 cm  ?? ??Width?- 1.6 cm  ?? ??Depth?- 0.1 cm  ?? ??Wound Bed Tissue Type?- Granulating, Necrotic tissue, eschar, Pale Pink  ?? ??Percent Necrotic Tissue Eschar?- 95 %  ?? ??Percent Other Tissue?- 5 %  ?? ??Pressure Ulcer Stage?- Unstageable  ?? ??Exudate Amount?- Small  ?? ??Exudate Type?- Serous  ?? ??Exudate Odor?- None  ?? ??Edge?- Well defined  ?? ??Surrounding Tissue Color?- Erythema  ?? ??Surrounding Tissue Condition?- Intact  Today wounds were?undressed and cleaned.? Right heel pressure ulcer?is improving.? There is 100% granulated today.? There is some surrounding callus?but this was small.? There is no drainage.? Patient did have a?large amount of?bleeding after bandage was removed.? This?was attempted to be stopped with silver nitrate but did not work.? Patient is on?anticoagulants.? Patient?wound was dressed with?Omnistat?granules?and Mesalt?to stop the bleeding.? Patient will leave this in place for 2 days?and then cleaned?every day with saline?and apply Mesalt?and gauze?and ABD pad and Kerlix.? The right ankle wound?was cleaned and?this is improving also. ?This was dressed with Mesalt and gauze.? Right lateral thigh with?wound that was noticed a week ago.? Today it is?eschared?with?some mild callus around it.? Because of the?eschar a?excisional?debridement?was done. ?This was cleaned with ChloraPrep.? And scissors and a 15 blade and pickups were used to debride the?eschar and surrounding?tissue.? Bleeding was stopped with pressure?and area?of debridement was 2.1 x?1.6 cm?equaling?3.3?cm?.? This was cleaned and?Medihoney and Mesalt was applied.? This was covered with gauze and tape. ?Patient will change this daily.? Patient is to?return in 1 week?for physician follow-up.  Physical  Exam  Vitals & Measurements  T:?36.5? ?C (Oral)? HR:?100(Peripheral)? RR:?18? BP:?93/63? SpO2:?99%?  ?  Assessment/Plan  1.?Pressure ulcer of right leg, unstageable?L89.890  Ordered:  Wound Care Outpatient, *Est. 03/15/22 3:00:00 CDT, *Est. Stop date 03/15/22 3:00:00 CDT, [facility], FU with Physician in 1 week  Wound Care Team Treatment, 03/08/22 13:49:00 CST, Stop date 03/08/22 13:49:00 CST, Right anterior ankle dressed daily with Mesalt gauze ABD pad and Kerlix. Right heel leave for 2 days and then daily cleansing with saline Mesalt gauze ABD pad right lateral thigh daily cleansing...  ?  2.?Pressure ulcer of right heel, stage 3?L89.613  Ordered:  Wound Care Outpatient, *Est. 03/15/22 3:00:00 CDT, *Est. Stop date 03/15/22 3:00:00 CDT, [facility], FU with Physician in 1 week  Wound Care Team Treatment, 03/08/22 13:49:00 CST, Stop date 03/08/22 13:49:00 CST, Right anterior ankle dressed daily with Mesalt gauze ABD pad and Kerlix. Right heel leave for 2 days and then daily cleansing with saline Mesalt gauze ABD pad right lateral thigh daily cleansing...  ?  3.?Toe ulcer?L97.509  Ordered:  Wound Care Outpatient, *Est. 03/15/22 3:00:00 CDT, *Est. Stop date 03/15/22 3:00:00 CDT, [facility], FU with Physician in 1 week  Wound Care Team Treatment, 03/08/22 13:49:00 CST, Stop date 03/08/22 13:49:00 CST, Right anterior ankle dressed daily with Mesalt gauze ABD pad and Kerlix. Right heel leave for 2 days and then daily cleansing with saline Mesalt gauze ABD pad right lateral thigh daily cleansing...  ?  4.?Lymphedema of leg?I89.0  Ordered:  Wound Care Outpatient, *Est. 03/15/22 3:00:00 CDT, *Est. Stop date 03/15/22 3:00:00 CDT, [facility], FU with Physician in 1 week  Wound Care Team Treatment, 03/08/22 13:49:00 CST, Stop date 03/08/22 13:49:00 CST, Right anterior ankle dressed daily with Mesalt gauze ABD pad and Kerlix. Right heel leave for 2 days and then daily cleansing with saline  Mesalt gauze ABD pad right lateral thigh daily cleansing...  ?  5.?Quadriplegia?G82.50  Ordered:  Wound Care Outpatient, *Est. 03/15/22 3:00:00 CDT, *Est. Stop date 03/15/22 3:00:00 CDT, [facility], FU with Physician in 1 week  Wound Care Team Treatment, 03/08/22 13:49:00 CST, Stop date 03/08/22 13:49:00 CST, Right anterior ankle dressed daily with Mesalt gauze ABD pad and Kerlix. Right heel leave for 2 days and then daily cleansing with saline Mesalt gauze ABD pad right lateral thigh daily cleansing...  ?   Problem List/Past Medical History  Ongoing  Chronic skin ulcer  DM (diabetes mellitus)  Infected decubitus ulcer  Lymphedema of leg  Neurogenic bladder  Obesity  Open wound of abdomen  Pressure ulcer of heel  Pressure ulcer of right ischium  Quadriplegia  Quadriplegic spinal paralysis  Skin eschar  Historical  Pressure ulcer of right foot stage 3  Medications  Augmentin 500 mg-125 mg oral tablet, 1 tab(s), Oral, TID  Bactroban 2% topical ointment, 1 aruna, TOP, TID,? ?Not Taking, Completed Rx  BASAGLAR INJ 100UNIT  Bydureon BCise 2 mg/0.85 mL subcutaneous suspension, extended release,? ?Not Taking per Prescriber  Ciprofloxacin Hcl 500 Mg Tab, 500 mg= 1 tab(s), BID,? ?Not Taking, Completed Rx  clindamycin 150 mg oral capsule, 300 mg= 2 cap(s), Oral, q6hr,? ?Not Taking, Completed Rx  Eliquis Starter Pack for Treatment of DVT and PE 5 mg oral tablet, 5 mg= 1 tab(s), Oral, BID  gentamicin 0.1% topical ointment, 1 aruna, TOP, Daily,? ?Not Taking, Completed Rx  gentamicin 0.1% topical ointment, 1 aruna, TOP, Daily, 2 refills  GLIPIZIDE TAB 10MG, 10 mg= 1 tab(s), Oral, BID,? ?Not Taking per Prescriber  JANUVIA TAB 100MG, 100 mg= 1 tab(s), Oral, Daily,? ?Not Taking per Prescriber  Januvia 50 mg oral tablet, 50 mg= 1 tab(s), Oral, Daily  Ketoconazole 2% Shampoo,? ?Not Taking, Completed Rx  Levofloxacin 500 Mg Tablet, 500 mg= 1 tab(s), Oral, Daily,? ?Not Taking, Completed Rx  metoprolol succinate 50 mg oral tablet,  extended release, 50 mg= 1 tab(s), Oral, BID  MUPIROCIN OIN 2%,? ?Not taking  MYRBETRIQ TAB 50MG, 50 mg= 1 tab(s), Oral, Daily  OXYBUTYNIN TAB 5MG,? ?Not Taking per Prescriber  ReliOn/NovoLIN R 100 units/mL injectable solution  sulfamethoxazole-trimethoprim 800 mg-160 mg oral tablet, 1 tab(s), Oral, BID,? ?Not Taking, Completed Rx  Tramadol Hcl Tab 50 Mg, 50 mg= 1 tab(s), Oral, q4-6hr,? ?Not Taking, Completed Rx  Allergies  No Known Allergies  Social History  Abuse/Neglect  No, 05/19/2020  Tobacco  Never (less than 100 in lifetime), N/A, 05/19/2020  Never smoker, 05/01/2017  Health Maintenance  Health Maintenance  ???Pending?(in the next year)  ??? ??OverDue  ??? ? ? ?ADL Screening due??05/19/21??and every 1??year(s)  ??? ? ? ?Obesity Screening due??01/01/22??and every 1??year(s)  ??? ? ? ?Alcohol Misuse Screening due??01/02/22??and every 1??year(s)  ??? ??Due?  ??? ? ? ?Aspirin Therapy for CVD Prevention due??03/08/22??and every 1??year(s)  ??? ? ? ?Medicare Annual Wellness Exam due??03/08/22??and every 1??year(s)  ??? ? ? ?Tetanus Vaccine due??03/08/22??and every 10??year(s)  ???Satisfied?(in the past 1 year)  ??? ??Satisfied?  ??? ? ? ?Blood Pressure Screening on??03/08/22.??Satisfied by Sally MADDOX, [place]  ?

## 2022-05-20 NOTE — HISTORICAL [facility] CERNER
This is a historical note converted from Becca. Formatting and pictures may have been removed.  Please reference Becca for original formatting and attached multimedia. Chief Complaint  Returns for wound recheck R heel / thigh. ? New wound to L posterior elbow. ?Reports hit it approximately 1 week ago and skin missing in area  History of Present Illness  Incision/Wounds  ?1. Heel Right Pressure ulcer?- last charted: 04/05/2022 11:01  ?? ??Assessment Done By?- Wound Care Team  ?? ??Pressure Point?- Bony prominence  ?? ??Dressing Type?- ABD dressing pad, Gauze dressing, Honey, Medicated packing strips, Rolled Gauze, Tape, Tubular bandage  ?? ??Dressing Assessment?- Drainage present, Intact  ?? ??Dressing Activity?- Changed  ?? ??Cleansing?- Cleaned with normal saline  ?? ??Length?- 4.3 cm  ?? ??Width?- 1.8 cm  ?? ??Depth?- 0.1 cm  ?? ??Wound Bed Tissue Type?- Granulating  ?? ??Percent Granulated?- 100 %  ?? ??Pressure Ulcer Stage?- III  ?? ??Exudate Amount?- Moderate  ?? ??Exudate Type?- Serosanguineous  ?? ??Exudate Odor?- None  ?? ??Edge?- Well defined  ?? ??Surrounding Tissue Color?- Normal  ?? ??Surrounding Tissue Condition?- Callus, Intact  ?? ??Status?- Improving  ?  ?2. Ankle Right Anterior Pressure ulcer?- last charted: 04/05/2022 11:01  ?? ??Assessment Done By?- Wound Care Team  ?? ??Dressing Type?- Gauze dressing, Medicated packing strips  ?? ??Dressing Assessment?- Drainage present, Intact  ?? ??Dressing Activity?- Changed  ?? ??Cleansing?- Cleaned with normal saline  ?? ??Length?- 0.5 cm  ?? ??Width?- 0.4 cm  ?? ??Depth?- 0.1 cm  ?? ??Wound Bed Tissue Type?- Granulating  ?? ??Pressure Ulcer Stage?- II  ?? ??Exudate Amount?- Scant  ?? ??Exudate Type?- Serous  ?? ??Exudate Odor?- None  ?? ??Edge?- Well defined  ?? ??Surrounding Tissue Color?- Normal  ?? ??Surrounding Tissue Condition?- Edematous  ?? ??Status?- Improving  ?  ?3. Thigh Right Lateral Pressure ulcer?- last charted: 04/05/2022 11:01  ??  ??Assessment Done By?- Wound Care Team  ?? ??Abnormality Pattern?- Flat  ?? ??Abnormality Color?- Brown, Red, Tan  ?? ??Dressing Type?- Gauze dressing, Tape  ?? ??Dressing Assessment?- Drainage present, Intact  ?? ??Dressing Activity?- Changed  ?? ??Cleansing?- Cleaned with normal saline  ?? ??Length?- 1.2 cm  ?? ??Width?- 0.8 cm  ?? ??Depth?- 0.1 cm  ?? ??Wound Bed Tissue Type?- Fibrotic, Granulating  ?? ??Percent Granulated?- 100 %  ?? ??Pressure Ulcer Stage?- III  ?? ??Exudate Amount?- Small  ?? ??Exudate Type?- Serosanguineous  ?? ??Exudate Odor?- None  ?? ??Edge?- Well defined  ?? ??Surrounding Tissue Color?- Normal  ?? ??Surrounding Tissue Condition?- Intact  ?? ??Status?- Improving  ?  ?4. Elbow Left Posterior Tear?- last charted: 04/05/2022 11:01  ?? ??Assessment Done By?- Wound Care Team  ?? ??Abnormality Color?- Pink, Red  ?? ??Pressure Point?- Bony prominence  ?? ??Dressing Type?- Gauze dressing, Tape  ?? ??Dressing Assessment?- Drainage present, Intact  ?? ??Dressing Activity?- Changed  ?? ??Cleansing?- Cleaned with normal saline  ?? ??Length?- 2.0 cm  ?? ??Width?- 1.5 cm  ?? ??Depth?- 0.1 cm  ?? ??Wound Bed Tissue Type?- Fibrotic, Granulating, Pale Pink  ?? ??Exudate Amount?- Moderate  ?? ??Exudate Type?- Sanguineous  ?? ??Exudate Odor?- Moderate  ?? ??Edge?- Well defined  ?? ??Surrounding Tissue Color?- Normal  ?? ??Surrounding Tissue Condition?- Intact  ?? ??Status?- Improving  Today right heel pressure ulcer is improving?this is 100% granulated today.? Right ankle?pressure ulcer also improving?again 100% granulated.? Right thigh?is fibrotic with granulating tissue.? This again is improving.? There is a new wound?to the left?elbow posterior.? This is a skin tear.? It is clean?and is?2 cm in length and 1.5 cm in width. ?The depth is 0.1 cm.? This is granulating pale pink.? All areas were cleaned.? Right heel?Mesalt gauze and ABD pad and Kerlix.? Right ankle?Medihoney Mesalt gauze and Kerlix.? Left  elbow Medihoney Mesalt and Band-Aid.? Right lateral thigh Medihoney Mesalt gauze and tape.? Patient will clean these daily and apply?the appropriate medications.? Patient return in 2 weeks for physician follow-up.  Physical Exam  Vitals & Measurements  T:?37.1? ?C (Oral)? HR:?104(Peripheral)? RR:?18? BP:?152/84? SpO2:?99%?  ?  Assessment/Plan  1.?Skin tear of left elbow without complication?S51.012A  Ordered:  Wound Care Outpatient, *Est. 04/12/22 3:00:00 CDT, *Est. Stop date 04/12/22 3:00:00 CDT, [facility], FU with Physician in 2 weeks  Wound Care Team Treatment, 04/05/22 11:37:00 CDT, Stop date 04/05/22 11:37:00 CDT, Daily dressing changes with normal saline cleaning. Right heel Mesalt gauze ABD pad Kerlix. Right ankle with Medihoney Mesalt gauze Kerlix. Left elbow and right lateral thigh with Medihoney Me...  ?  2.?Pressure ulcer of right leg, unstageable?L89.890  Ordered:  Wound Care Outpatient, *Est. 04/12/22 3:00:00 CDT, *Est. Stop date 04/12/22 3:00:00 CDT, [facility], FU with Physician in 2 weeks  Wound Care Team Treatment, 04/05/22 11:37:00 CDT, Stop date 04/05/22 11:37:00 CDT, Daily dressing changes with normal saline cleaning. Right heel Mesalt gauze ABD pad Kerlix. Right ankle with Medihoney Mesalt gauze Kerlix. Left elbow and right lateral thigh with Medihoney Me...  ?  3.?Pressure ulcer of right heel, stage 3?L89.613  Ordered:  Wound Care Outpatient, *Est. 04/12/22 3:00:00 CDT, *Est. Stop date 04/12/22 3:00:00 CDT, [facility], FU with Physician in 2 weeks  Wound Care Team Treatment, 04/05/22 11:37:00 CDT, Stop date 04/05/22 11:37:00 CDT, Daily dressing changes with normal saline cleaning. Right heel Mesalt gauze ABD pad Kerlix. Right ankle with Medihoney Mesalt gauze Kerlix. Left elbow and right lateral thigh with Medihoney Me...  ?  4.?Toe ulcer?L97.509  Ordered:  Wound Care Outpatient, *Est. 04/12/22 3:00:00 CDT, *Est. Stop date 04/12/22 3:00:00 CDT, [facility],  FU with Physician in 2 weeks  Wound Care Team Treatment, 04/05/22 11:37:00 CDT, Stop date 04/05/22 11:37:00 CDT, Daily dressing changes with normal saline cleaning. Right heel Mesalt gauze ABD pad Kerlix. Right ankle with Medihoney Mesalt gauze Kerlix. Left elbow and right lateral thigh with Medihoney Me...  ?  5.?Lymphedema of leg?I89.0  Ordered:  Wound Care Outpatient, *Est. 04/12/22 3:00:00 CDT, *Est. Stop date 04/12/22 3:00:00 CDT, [facility], FU with Physician in 2 weeks  Wound Care Team Treatment, 04/05/22 11:37:00 CDT, Stop date 04/05/22 11:37:00 CDT, Daily dressing changes with normal saline cleaning. Right heel Mesalt gauze ABD pad Kerlix. Right ankle with Medihoney Mesalt gauze Kerlix. Left elbow and right lateral thigh with Medihoney Me...  ?  6.?Quadriplegia?G82.50  Ordered:  Wound Care Outpatient, *Est. 04/12/22 3:00:00 CDT, *Est. Stop date 04/12/22 3:00:00 CDT, [facility], FU with Physician in 2 weeks  Wound Care Team Treatment, 04/05/22 11:37:00 CDT, Stop date 04/05/22 11:37:00 CDT, Daily dressing changes with normal saline cleaning. Right heel Mesalt gauze ABD pad Kerlix. Right ankle with Medihoney Mesalt gauze Kerlix. Left elbow and right lateral thigh with Medihoney Me...  ?   Problem List/Past Medical History  Ongoing  Chronic skin ulcer  DM (diabetes mellitus)  Infected decubitus ulcer  Lymphedema of leg  Neurogenic bladder  Obesity  Open wound of abdomen  Pressure ulcer of heel  Pressure ulcer of right ischium  Quadriplegia  Quadriplegic spinal paralysis  Skin eschar  Historical  Pressure ulcer of right foot stage 3  Medications  Augmentin 500 mg-125 mg oral tablet, 1 tab(s), Oral, TID  Bactroban 2% topical ointment, 1 aruna, TOP, TID,? ?Not Taking, Completed Rx  BASAGLAR INJ 100UNIT  Bydureon BCise 2 mg/0.85 mL subcutaneous suspension, extended release,? ?Not Taking per Prescriber  Ciprofloxacin Hcl 500 Mg Tab, 500 mg= 1 tab(s), BID,? ?Not Taking, Completed Rx  clindamycin 150  mg oral capsule, 300 mg= 2 cap(s), Oral, q6hr,? ?Not Taking, Completed Rx  Eliquis Starter Pack for Treatment of DVT and PE 5 mg oral tablet, 5 mg= 1 tab(s), Oral, BID  gentamicin 0.1% topical ointment, 1 aruna, TOP, Daily,? ?Not Taking, Completed Rx  gentamicin 0.1% topical ointment, 1 aruna, TOP, Daily, 2 refills  GLIPIZIDE TAB 10MG, 10 mg= 1 tab(s), Oral, BID,? ?Not Taking per Prescriber  JANUVIA TAB 100MG, 100 mg= 1 tab(s), Oral, Daily,? ?Not Taking per Prescriber  Januvia 50 mg oral tablet, 50 mg= 1 tab(s), Oral, Daily  Ketoconazole 2% Shampoo,? ?Not Taking, Completed Rx  Levofloxacin 500 Mg Tablet, 500 mg= 1 tab(s), Oral, Daily,? ?Not Taking, Completed Rx  metoprolol succinate 50 mg oral tablet, extended release, 50 mg= 1 tab(s), Oral, BID  MUPIROCIN OIN 2%,? ?Not taking  MYRBETRIQ TAB 50MG, 50 mg= 1 tab(s), Oral, Daily  OXYBUTYNIN TAB 5MG,? ?Not Taking per Prescriber  ReliOn/NovoLIN R 100 units/mL injectable solution  sulfamethoxazole-trimethoprim 800 mg-160 mg oral tablet, 1 tab(s), Oral, BID,? ?Not Taking, Completed Rx  Tramadol Hcl Tab 50 Mg, 50 mg= 1 tab(s), Oral, q4-6hr,? ?Not Taking, Completed Rx  Allergies  No Known Allergies  Social History  Abuse/Neglect  No, 05/19/2020  Tobacco  Never (less than 100 in lifetime), N/A, 05/19/2020  Never smoker, 05/01/2017  Health Maintenance  Health Maintenance  ???Pending?(in the next year)  ??? ??OverDue  ??? ? ? ?ADL Screening due??05/19/21??and every 1??year(s)  ??? ? ? ?Obesity Screening due??01/01/22??and every 1??year(s)  ??? ? ? ?Alcohol Misuse Screening due??01/02/22??and every 1??year(s)  ??? ??Due?  ??? ? ? ?Aspirin Therapy for CVD Prevention due??04/05/22??and every 1??year(s)  ??? ? ? ?Medicare Annual Wellness Exam due??04/05/22??and every 1??year(s)  ??? ? ? ?Tetanus Vaccine due??04/05/22??and every 10??year(s)  ???Satisfied?(in the past 1 year)  ??? ??Satisfied?  ??? ? ? ?Blood Pressure Screening on??04/05/22.??Satisfied by Junior MADDOX, [place]  ?

## 2022-05-31 ENCOUNTER — HOSPITAL ENCOUNTER (OUTPATIENT)
Dept: WOUND CARE | Facility: HOSPITAL | Age: 57
Discharge: HOME | End: 2022-05-31
Attending: PEDIATRICS
Payer: MEDICARE

## 2022-05-31 VITALS
OXYGEN SATURATION: 97 % | DIASTOLIC BLOOD PRESSURE: 82 MMHG | RESPIRATION RATE: 18 BRPM | HEART RATE: 99 BPM | SYSTOLIC BLOOD PRESSURE: 119 MMHG | TEMPERATURE: 98 F

## 2022-05-31 DIAGNOSIS — L89.023: ICD-10-CM

## 2022-05-31 DIAGNOSIS — L02.419 ABSCESS OF CALF: ICD-10-CM

## 2022-05-31 DIAGNOSIS — L89.613 PRESSURE INJURY OF RIGHT HEEL, STAGE 3: ICD-10-CM

## 2022-05-31 PROCEDURE — 99213 OFFICE O/P EST LOW 20 MIN: CPT | Mod: ,,, | Performed by: PEDIATRICS

## 2022-05-31 PROCEDURE — 99213 OFFICE O/P EST LOW 20 MIN: CPT

## 2022-05-31 PROCEDURE — 99213 PR OFFICE/OUTPT VISIT, EST, LEVL III, 20-29 MIN: ICD-10-PCS | Mod: ,,, | Performed by: PEDIATRICS

## 2022-05-31 NOTE — PROGRESS NOTES
Subjective:       Patient ID: Antonio Galvan II is a 56 y.o. male.    Chief Complaint: Pressure Ulcer (L elbow/R heel) and Abscess (L posterior leg)    HPI  Review of Systems      Objective:        Physical Exam       Altered Skin Integrity 05/06/22 1140 Right Heel  Full thickness tissue loss. Subcutaneous fat may be visible but bone, tendon or muscle are not exposed (Active)   05/06/22 1140   Altered Skin Integrity Present on Admission:    Side: Right   Orientation:    Location: Heel   Wound Number:    Is this injury device related?: No   Primary Wound Type:    Description of Altered Skin Integrity: Full thickness tissue loss. Subcutaneous fat may be visible but bone, tendon or muscle are not exposed   Removal Indication and Assessment:    Wound Outcome:    (Retired) Wound Length (cm):    (Retired) Wound Width (cm):    (Retired) Depth (cm):    Wound Description (Comments):    Removal Indications:    Wound Image   05/31/22 1100   Description of Altered Skin Integrity Full thickness tissue loss. Subcutaneous fat may be visible but bone, tendon or muscle are not exposed 05/31/22 1100   Dressing Appearance Intact;Moist drainage 05/31/22 1100   Drainage Amount Moderate 05/31/22 1100   Drainage Characteristics/Odor Serosanguineous 05/31/22 1100   Appearance Pink;Red;Granulating;Fibrin 05/31/22 1100   Tissue loss description Full thickness 05/31/22 1100   Red (%), Wound Tissue Color 100 % 05/31/22 1100   Periwound Area Intact 05/31/22 1100   Wound Edges Defined 05/31/22 1100   Wound Length (cm) 4.1 cm 05/31/22 1100   Wound Width (cm) 3 cm 05/31/22 1100   Wound Depth (cm) 0.1 cm 05/31/22 1100   Wound Volume (cm^3) 1.23 cm^3 05/31/22 1100   Wound Surface Area (cm^2) 12.3 cm^2 05/31/22 1100   Care Cleansed with:;Sterile normal saline 05/31/22 1100   Dressing Changed;Collagen;Silver;Gauze;Rolled gauze;Tubular bandage 05/31/22 1100   Periwound Care Skin barrier film applied 05/31/22 1100            Altered Skin Integrity  05/06/22 1147 Right posterior Calf Abscess Full thickness tissue loss. Subcutaneous fat may be visible but bone, tendon or muscle are not exposed (Active)   05/06/22 1147   Altered Skin Integrity Present on Admission:    Side: Right   Orientation: posterior   Location: Calf   Wound Number:    Is this injury device related?: No   Primary Wound Type: Abscess   Description of Altered Skin Integrity: Full thickness tissue loss. Subcutaneous fat may be visible but bone, tendon or muscle are not exposed   Removal Indication and Assessment:    Wound Outcome:    (Retired) Wound Length (cm):    (Retired) Wound Width (cm):    (Retired) Depth (cm):    Wound Description (Comments):    Removal Indications:    Wound Image   05/31/22 1100   Description of Altered Skin Integrity Full thickness tissue loss. Subcutaneous fat may be visible but bone, tendon or muscle are not exposed 05/31/22 1100   Dressing Appearance Intact;Moist drainage 05/31/22 1100   Drainage Amount Small 05/31/22 1100   Drainage Characteristics/Odor Serosanguineous 05/31/22 1100   Appearance Red;Pink;Fibrin;Granulating 05/31/22 1100   Tissue loss description Full thickness 05/31/22 1100   Red (%), Wound Tissue Color 100 % 05/31/22 1100   Periwound Area Intact 05/31/22 1100   Wound Edges Defined 05/31/22 1100   Wound Length (cm) 1.6 cm 05/31/22 1100   Wound Width (cm) 2 cm 05/31/22 1100   Wound Depth (cm) 0.3 cm 05/31/22 1100   Wound Volume (cm^3) 0.96 cm^3 05/31/22 1100   Wound Surface Area (cm^2) 3.2 cm^2 05/31/22 1100   Care Cleansed with:;Sterile normal saline 05/31/22 1100   Dressing Changed;Gauze;Collagen;Silver 05/31/22 1100   Periwound Care Skin barrier film applied 05/31/22 1100            Altered Skin Integrity Left posterior Arm Other (comment) (Active)       Altered Skin Integrity Present on Admission:    Side: Left   Orientation: posterior   Location: Arm   Wound Number:    Is this injury device related?:    Primary Wound Type: Other   Description of  Altered Skin Integrity:    Removal Indication and Assessment:    Wound Outcome:    (Retired) Wound Length (cm):    (Retired) Wound Width (cm):    (Retired) Depth (cm):    Wound Description (Comments):    Removal Indications:    Wound Image   05/31/22 1100   Description of Altered Skin Integrity Full thickness tissue loss. Subcutaneous fat may be visible but bone, tendon or muscle are not exposed 05/31/22 1100   Dressing Appearance Intact;Moist drainage 05/31/22 1100   Drainage Amount Small 05/31/22 1100   Drainage Characteristics/Odor Serosanguineous 05/31/22 1100   Appearance Pink;Red;Granulating 05/31/22 1100   Tissue loss description Full thickness 05/31/22 1100   Red (%), Wound Tissue Color 100 % 05/31/22 1100   Periwound Area Intact 05/31/22 1100   Wound Edges Defined 05/31/22 1100   Wound Length (cm) 0.2 cm 05/31/22 1100   Wound Width (cm) 0.3 cm 05/31/22 1100   Wound Depth (cm) 0.1 cm 05/31/22 1100   Wound Volume (cm^3) 0.006 cm^3 05/31/22 1100   Wound Surface Area (cm^2) 0.06 cm^2 05/31/22 1100   Care Cleansed with:;Sterile normal saline 05/31/22 1100   Dressing Changed;Bandaid 05/31/22 1100   Periwound Care Skin barrier film applied 05/31/22 1100         Assessment:     today the injuries to the right heel and calf are improving.  There is good granulating tissue.  These areas were cleaned and collagen was applied to both wounds and bandaged.  The elbow ulcer it is almost completely healed and will be dressed with a foam dressing.  Patient change this every other day and return in 2 weeks.    ICD-10-CM ICD-9-CM   1. Pressure injury of left elbow, stage 3  L89.023 707.01     707.23   2. Pressure injury of right heel, stage 3  L89.613 707.07     707.23   3. Abscess of calf  L02.419 682.6         Plan:   Tissue pathology and/or culture taken:  [] Yes [x] No   Sharp debridement performed:   [] Yes [x] No   Labs ordered this visit:   [] Yes [x] No   Imaging ordered this visit:   [] Yes [x] No           Orders  Placed This Encounter   Procedures    Change dressing     Cleanse wound with: NS, soap and water, or wound cleanser  Lidocaine: Lidocaine 2% gel PRN in wound care center  Periwound care: Skin prep  Primary dressing: Collagen Ag to R heel and R posterior leg. Bandaid to L eblow   Secondary dressing: Gauze, tape to R posterior leg and gauze, abd pad, kerlix, tape to R heel   Edema control: Tubigrip E   Frequency: Every other day   Follow-up: 2 weeks   Home Health: No   Other Orders: None     Standing Status:   Standing     Number of Occurrences:   1     Standing Expiration Date:   5/31/2022        Follow up in about 2 weeks (around 6/14/2022).

## 2022-05-31 NOTE — PATIENT INSTRUCTIONS
Clean all areas with normal saline or wound cleanser. Apply Bandaid to L elbow. Change daily.  Apply Collagen Ag to R heel and R posterior leg. Change every other day.  DO NOT LEAVE WOUNDS OPEN TO AIR.  Continue with Tubigrip E maria elena TRUJILLO  [facility] [phone]

## 2022-05-31 NOTE — ADDENDUM NOTE
Encounter addended by: Nguyen Pacheco RN on: 5/31/2022 1:17 PM   Actions taken: Actions taken from a BestPractice Advisory

## 2022-06-14 ENCOUNTER — HOSPITAL ENCOUNTER (OUTPATIENT)
Dept: WOUND CARE | Facility: HOSPITAL | Age: 57
Discharge: HOME | End: 2022-06-14
Attending: PEDIATRICS
Payer: MEDICARE

## 2022-06-14 DIAGNOSIS — L89.023: Primary | ICD-10-CM

## 2022-06-14 DIAGNOSIS — L02.419 ABSCESS OF CALF: ICD-10-CM

## 2022-06-14 DIAGNOSIS — L89.613 PRESSURE INJURY OF RIGHT HEEL, STAGE 3: ICD-10-CM

## 2022-06-14 PROCEDURE — 99214 OFFICE O/P EST MOD 30 MIN: CPT

## 2022-06-14 PROCEDURE — 99213 PR OFFICE/OUTPT VISIT, EST, LEVL III, 20-29 MIN: ICD-10-PCS | Mod: ,,, | Performed by: PEDIATRICS

## 2022-06-14 PROCEDURE — 87070 CULTURE OTHR SPECIMN AEROBIC: CPT

## 2022-06-14 PROCEDURE — 99213 OFFICE O/P EST LOW 20 MIN: CPT | Mod: ,,, | Performed by: PEDIATRICS

## 2022-06-14 NOTE — PROGRESS NOTES
Subjective:       Patient ID: Antonio Galvan II is a 56 y.o. male.    Chief Complaint: Non-healing Wound Follow Up (R posterior lower leg getting larger, R heel stg 3 pressure ulcer, Left elbow)    HPI  Review of Systems      Objective:         Physical Exam       Altered Skin Integrity 05/06/22 1140 Right Heel  Full thickness tissue loss. Subcutaneous fat may be visible but bone, tendon or muscle are not exposed (Active)   05/06/22 1140   Altered Skin Integrity Present on Admission:    Side: Right   Orientation:    Location: Heel   Wound Number:    Is this injury device related?: No   Primary Wound Type:    Description of Altered Skin Integrity: Full thickness tissue loss. Subcutaneous fat may be visible but bone, tendon or muscle are not exposed   Removal Indication and Assessment:    Wound Outcome:    (Retired) Wound Length (cm):    (Retired) Wound Width (cm):    (Retired) Depth (cm):    Wound Description (Comments):    Removal Indications:    Wound Image   06/14/22 1100   Description of Altered Skin Integrity Full thickness tissue loss. Subcutaneous fat may be visible but bone, tendon or muscle are not exposed 06/14/22 1100   Dressing Appearance Intact;Moist drainage 06/14/22 1100   Drainage Amount Moderate 06/14/22 1100   Drainage Characteristics/Odor Serosanguineous;No odor;Bleeding controlled 06/14/22 1100   Appearance Pink;Red;Granulating 06/14/22 1100   Tissue loss description Full thickness 06/14/22 1100   Red (%), Wound Tissue Color 100 % 06/14/22 1100   Wound Edges Irregular 06/14/22 1100   Wound Length (cm) 4.5 cm 06/14/22 1100   Wound Width (cm) 2.5 cm 06/14/22 1100   Wound Depth (cm) 0.1 cm 06/14/22 1100   Wound Volume (cm^3) 1.125 cm^3 06/14/22 1100   Wound Surface Area (cm^2) 11.25 cm^2 06/14/22 1100   Care Cleansed with:;Sterile normal saline 06/14/22 1100   Dressing Applied;Honey;Sodium chloride impregnated;Gauze;Absorptive Pad;Rolled gauze 06/14/22 1100   Compression Tubular elasticized  bandage 06/14/22 1100            Altered Skin Integrity 05/06/22 1147 Right posterior Calf Abscess Full thickness tissue loss. Subcutaneous fat may be visible but bone, tendon or muscle are not exposed (Active)   05/06/22 1147   Altered Skin Integrity Present on Admission:    Side: Right   Orientation: posterior   Location: Calf   Wound Number:    Is this injury device related?: No   Primary Wound Type: Abscess   Description of Altered Skin Integrity: Full thickness tissue loss. Subcutaneous fat may be visible but bone, tendon or muscle are not exposed   Removal Indication and Assessment:    Wound Outcome:    (Retired) Wound Length (cm):    (Retired) Wound Width (cm):    (Retired) Depth (cm):    Wound Description (Comments):    Removal Indications:    Wound Image   06/14/22 1100   Dressing Appearance Moist drainage 06/14/22 1100   Drainage Amount Moderate 06/14/22 1100   Drainage Characteristics/Odor Serosanguineous;No odor 06/14/22 1100   Appearance Red;Maroon;Ecchymotic;Fibrin;Not granulating;Purple 06/14/22 1100   Tissue loss description Full thickness 06/14/22 1100   Periwound Area Ecchymotic;Edematous 06/14/22 1100   Wound Edges Defined 06/14/22 1100   Wound Length (cm) 2.5 cm 06/14/22 1100   Wound Width (cm) 4.5 cm 06/14/22 1100   Wound Depth (cm) 0.7 cm 06/14/22 1100   Wound Volume (cm^3) 7.875 cm^3 06/14/22 1100   Wound Surface Area (cm^2) 11.25 cm^2 06/14/22 1100   Care Cleansed with:;Sterile normal saline 06/14/22 1100   Dressing Applied;Sodium chloride impregnated;Honey;Gauze;Absorptive Pad;Rolled gauze;Tubular bandage 06/14/22 1100       [REMOVED]      Altered Skin Integrity Left posterior Arm Other (comment) (Removed)       Altered Skin Integrity Present on Admission:    Side: Left   Orientation: posterior   Location: Arm   Wound Number:    Is this injury device related?:    Primary Wound Type: Other   Description of Altered Skin Integrity:    Removal Indication and Assessment:    Wound Outcome: Healed    (Retired) Wound Length (cm):    (Retired) Wound Width (cm):    (Retired) Depth (cm):    Wound Description (Comments):    Removal Indications:    Removed 06/14/22 1128   Wound Image   06/14/22 1100   Dressing Appearance Intact;Dry;Clean 06/14/22 1100   Drainage Amount None 06/14/22 1100   Appearance Closed/resurfaced 06/14/22 1100   Wound Length (cm) 0 cm 06/14/22 1100   Wound Width (cm) 0 cm 06/14/22 1100   Wound Depth (cm) 0 cm 06/14/22 1100   Wound Volume (cm^3) 0 cm^3 06/14/22 1100   Wound Surface Area (cm^2) 0 cm^2 06/14/22 1100   Dressing Applied;Bandaid 06/14/22 1100         Assessment:        today pressure injury of left elbow is completely healed. pressure injury right heel is 4.5 cm x 2.5 cm.  This is granulating.  Area was cleaned and me salt in med behind the applied to the affected area.  Right posterior calf with old abscess which is enlarging.  There is no pus at this time.  This again was cleaned with normal saline and Medihoney and mesalt  applied to the area and was covered with cover dressing and Tubigrip P. Patient will change all his dressings once daily.  And will follow up in 2 weeks for physician visit.      ICD-10-CM ICD-9-CM   1. Pressure injury of left elbow, stage 3  L89.023 707.01     707.23   2. Pressure injury of right heel, stage 3  L89.613 707.07     707.23   3. Abscess of calf  L02.419 682.6         Plan:   Tissue pathology and/or culture taken:  [] Yes [] No   Sharp debridement performed:   [] Yes [x] No   Labs ordered this visit:   [] Yes [x] No   Imaging ordered this visit:   [] Yes [x] No           Orders Placed This Encounter   Procedures    Wound Culture    Change dressing     All wounds:  Cleanse wound with: NS, soap and water, or wound cleanser  Periwound care: Skin prep PRN  Primary dressing: medihoney/mesalt  Secondary dressing:gauze / abd prn/ cover dressing  Offloading:  Edema control: Tubigrip E   Frequency: daily  Follow-up: 2 weeks     Standing Status:   Standing      Number of Occurrences:   1        Follow up in about 2 weeks (around 6/28/2022) for md visit .

## 2022-06-14 NOTE — PROGRESS NOTES
Subjective:       Patient ID: Antonio Galvan II is a 56 y.o. male.    Chief Complaint: Non-healing Wound Follow Up (R posterior lower leg getting larger, R heel stg 3 pressure ulcer, Left elbow)    HPI  Review of Systems      Objective:         Physical Exam       Altered Skin Integrity 05/06/22 1140 Right Heel  Full thickness tissue loss. Subcutaneous fat may be visible but bone, tendon or muscle are not exposed (Active)   05/06/22 1140   Altered Skin Integrity Present on Admission:    Side: Right   Orientation:    Location: Heel   Wound Number:    Is this injury device related?: No   Primary Wound Type:    Description of Altered Skin Integrity: Full thickness tissue loss. Subcutaneous fat may be visible but bone, tendon or muscle are not exposed   Removal Indication and Assessment:    Wound Outcome:    (Retired) Wound Length (cm):    (Retired) Wound Width (cm):    (Retired) Depth (cm):    Wound Description (Comments):    Removal Indications:    Wound Image   06/14/22 1100   Description of Altered Skin Integrity Full thickness tissue loss. Subcutaneous fat may be visible but bone, tendon or muscle are not exposed 06/14/22 1100   Dressing Appearance Intact;Moist drainage 06/14/22 1100   Drainage Amount Moderate 06/14/22 1100   Drainage Characteristics/Odor Serosanguineous;No odor;Bleeding controlled 06/14/22 1100   Appearance Pink;Red;Granulating 06/14/22 1100   Tissue loss description Full thickness 06/14/22 1100   Red (%), Wound Tissue Color 100 % 06/14/22 1100   Wound Edges Irregular 06/14/22 1100   Wound Length (cm) 4.5 cm 06/14/22 1100   Wound Width (cm) 2.5 cm 06/14/22 1100   Wound Depth (cm) 0.1 cm 06/14/22 1100   Wound Volume (cm^3) 1.125 cm^3 06/14/22 1100   Wound Surface Area (cm^2) 11.25 cm^2 06/14/22 1100   Care Cleansed with:;Sterile normal saline 06/14/22 1100   Dressing Applied;Honey;Sodium chloride impregnated;Gauze;Absorptive Pad;Rolled gauze 06/14/22 1100   Compression Tubular elasticized  bandage 06/14/22 1100            Altered Skin Integrity 05/06/22 1147 Right posterior Calf Abscess Full thickness tissue loss. Subcutaneous fat may be visible but bone, tendon or muscle are not exposed (Active)   05/06/22 1147   Altered Skin Integrity Present on Admission:    Side: Right   Orientation: posterior   Location: Calf   Wound Number:    Is this injury device related?: No   Primary Wound Type: Abscess   Description of Altered Skin Integrity: Full thickness tissue loss. Subcutaneous fat may be visible but bone, tendon or muscle are not exposed   Removal Indication and Assessment:    Wound Outcome:    (Retired) Wound Length (cm):    (Retired) Wound Width (cm):    (Retired) Depth (cm):    Wound Description (Comments):    Removal Indications:    Wound Image   06/14/22 1100   Dressing Appearance Moist drainage 06/14/22 1100   Drainage Amount Moderate 06/14/22 1100   Drainage Characteristics/Odor Serosanguineous;No odor 06/14/22 1100   Appearance Red;Maroon;Ecchymotic;Fibrin;Not granulating;Purple 06/14/22 1100   Tissue loss description Full thickness 06/14/22 1100   Periwound Area Ecchymotic;Edematous 06/14/22 1100   Wound Edges Defined 06/14/22 1100   Wound Length (cm) 2.5 cm 06/14/22 1100   Wound Width (cm) 4.5 cm 06/14/22 1100   Wound Depth (cm) 0.7 cm 06/14/22 1100   Wound Volume (cm^3) 7.875 cm^3 06/14/22 1100   Wound Surface Area (cm^2) 11.25 cm^2 06/14/22 1100   Care Cleansed with:;Sterile normal saline 06/14/22 1100   Dressing Applied;Sodium chloride impregnated;Honey;Gauze;Absorptive Pad;Rolled gauze;Tubular bandage 06/14/22 1100       [REMOVED]      Altered Skin Integrity Left posterior Arm Other (comment) (Removed)       Altered Skin Integrity Present on Admission:    Side: Left   Orientation: posterior   Location: Arm   Wound Number:    Is this injury device related?:    Primary Wound Type: Other   Description of Altered Skin Integrity:    Removal Indication and Assessment:    Wound Outcome: Healed    (Retired) Wound Length (cm):    (Retired) Wound Width (cm):    (Retired) Depth (cm):    Wound Description (Comments):    Removal Indications:    Removed 06/14/22 1128   Wound Image   06/14/22 1100   Dressing Appearance Intact;Dry;Clean 06/14/22 1100   Drainage Amount None 06/14/22 1100   Appearance Closed/resurfaced 06/14/22 1100   Wound Length (cm) 0 cm 06/14/22 1100   Wound Width (cm) 0 cm 06/14/22 1100   Wound Depth (cm) 0 cm 06/14/22 1100   Wound Volume (cm^3) 0 cm^3 06/14/22 1100   Wound Surface Area (cm^2) 0 cm^2 06/14/22 1100   Dressing Applied;Bandaid 06/14/22 1100         Assessment:         ICD-10-CM ICD-9-CM   1. Pressure injury of left elbow, stage 3  L89.023 707.01     707.23   2. Pressure injury of right heel, stage 3  L89.613 707.07     707.23   3. Abscess of calf  L02.419 682.6         Plan:   Tissue pathology and/or culture taken:  [] Yes [x] No   Sharp debridement performed:   [] Yes [x] No   Labs ordered this visit:   [] Yes [x] No   Imaging ordered this visit:   [] Yes [x] No           Orders Placed This Encounter   Procedures    Wound Culture    Change dressing     All wounds:  Cleanse wound with: NS, soap and water, or wound cleanser  Periwound care: Skin prep PRN  Primary dressing: medihoney/mesalt  Secondary dressing:gauze / abd prn/ cover dressing  Offloading:  Edema control: Tubigrip E   Frequency: daily  Follow-up: 2 weeks     Standing Status:   Standing     Number of Occurrences:   1        Follow up in about 2 weeks (around 6/28/2022) for md visit .

## 2022-06-14 NOTE — PATIENT INSTRUCTIONS
All wounds:  Cleanse wound with: NS, soap and water, or wound cleanser  Periwound care: Skin prep PRN  Primary dressing: medihoney/mesalt  Secondary dressing:gauze / abd prn/ cover dressing  Offloading: Pressure Relief  to heel and posterior lower leg  Edema control: Tubigrip E   Frequency: daily  Follow-up: 2 weeks

## 2022-06-14 NOTE — ADDENDUM NOTE
Encounter addended by: Nguyen Pacheco RN on: 6/14/2022 4:43 PM   Actions taken: Clinical Note Signed

## 2022-06-17 ENCOUNTER — TELEPHONE (OUTPATIENT)
Dept: WOUND CARE | Facility: HOSPITAL | Age: 57
End: 2022-06-17
Payer: MEDICARE

## 2022-06-17 LAB
BACTERIA SPEC CULT: ABNORMAL

## 2022-06-17 NOTE — TELEPHONE ENCOUNTER
Pt culture results final. MD aware, telephone orders received. Per MD, prescription for Bactrim DS BID x 10 days and Gentamicin Sulfate 0.1% Ointment called into pharmacy (spoke to Gabriel at [address] in [facility]). Telephone orders also received for new wound care instructions. Instructions for patient to apply Gentamicin Ointment topically to wound beds (R posterior leg and R heel) and to cover with mesalt, gauze, abd pad, tape daily. Attempted to call patient twice on primary line ([phone]) to let him know about new medication orders and new dressing change orders and was unable to reach patient or leave a message.     6/17/22 1050. Pt returned call and instructed on new medications and to take as directed. Pt also instructed on new wound care instructions. Pt verbalized understanding.

## 2022-06-28 ENCOUNTER — HOSPITAL ENCOUNTER (OUTPATIENT)
Dept: WOUND CARE | Facility: HOSPITAL | Age: 57
Discharge: HOME | End: 2022-06-28
Attending: PEDIATRICS
Payer: MEDICARE

## 2022-06-28 VITALS
TEMPERATURE: 98 F | OXYGEN SATURATION: 97 % | SYSTOLIC BLOOD PRESSURE: 137 MMHG | RESPIRATION RATE: 20 BRPM | HEART RATE: 76 BPM | DIASTOLIC BLOOD PRESSURE: 84 MMHG

## 2022-06-28 DIAGNOSIS — L02.419 ABSCESS OF CALF: ICD-10-CM

## 2022-06-28 DIAGNOSIS — L89.613 PRESSURE INJURY OF RIGHT HEEL, STAGE 3: Primary | ICD-10-CM

## 2022-06-28 PROCEDURE — 99213 OFFICE O/P EST LOW 20 MIN: CPT

## 2022-06-28 PROCEDURE — 99213 PR OFFICE/OUTPT VISIT, EST, LEVL III, 20-29 MIN: ICD-10-PCS | Mod: ,,, | Performed by: PEDIATRICS

## 2022-06-28 PROCEDURE — 99213 OFFICE O/P EST LOW 20 MIN: CPT | Mod: ,,, | Performed by: PEDIATRICS

## 2022-06-28 RX ORDER — GENTAMICIN SULFATE 1 MG/G
OINTMENT TOPICAL DAILY
Qty: 30 G | Refills: 1 | Status: SHIPPED | OUTPATIENT
Start: 2022-06-28 | End: 2022-07-12

## 2022-06-28 NOTE — PROGRESS NOTES
Subjective:       Patient ID: Antonio Galvan II is a 56 y.o. male.    Chief Complaint: Pressure Ulcer (Pressure injury of R heel)    HPI  Review of Systems      Objective:      Temp:  [97.9 °F (36.6 °C)]   Pulse:  [76]   Resp:  [20]   BP: (137)/(84)   SpO2:  [97 %]   Physical Exam       Altered Skin Integrity 05/06/22 1140 Right Heel  Full thickness tissue loss. Subcutaneous fat may be visible but bone, tendon or muscle are not exposed (Active)   05/06/22 1140   Altered Skin Integrity Present on Admission:    Side: Right   Orientation:    Location: Heel   Wound Number:    Is this injury device related?: No   Primary Wound Type:    Description of Altered Skin Integrity: Full thickness tissue loss. Subcutaneous fat may be visible but bone, tendon or muscle are not exposed   Removal Indication and Assessment:    Wound Outcome:    (Retired) Wound Length (cm):    (Retired) Wound Width (cm):    (Retired) Depth (cm):    Wound Description (Comments):    Removal Indications:    Wound Image   06/28/22 1100   Description of Altered Skin Integrity Full thickness tissue loss. Subcutaneous fat may be visible but bone, tendon or muscle are not exposed 06/28/22 1100   Dressing Appearance Moist drainage 06/28/22 1100   Drainage Amount Moderate 06/28/22 1100   Drainage Characteristics/Odor Serosanguineous 06/28/22 1100   Appearance Pink;Red;Granulating;Moist 06/28/22 1100   Tissue loss description Full thickness 06/28/22 1100   Red (%), Wound Tissue Color 100 % 06/28/22 1100   Wound Edges Jagged 06/28/22 1100   Wound Length (cm) 4.5 cm 06/28/22 1100   Wound Width (cm) 3.5 cm 06/28/22 1100   Wound Depth (cm) 0.1 cm 06/28/22 1100   Wound Volume (cm^3) 1.575 cm^3 06/28/22 1100   Wound Surface Area (cm^2) 15.75 cm^2 06/28/22 1100   Care Cleansed with:;Antimicrobial agent;Applied:;Skin Barrier;Other (see comments) 06/28/22 1100   Dressing Applied;Sodium chloride impregnated;Other (comment) 06/28/22 1100   Compression Tubular  elasticized bandage 06/28/22 1100            Altered Skin Integrity 05/06/22 1147 Right posterior Calf Abscess Full thickness tissue loss. Subcutaneous fat may be visible but bone, tendon or muscle are not exposed (Active)   05/06/22 1147   Altered Skin Integrity Present on Admission:    Side: Right   Orientation: posterior   Location: Calf   Wound Number:    Is this injury device related?: No   Primary Wound Type: Abscess   Description of Altered Skin Integrity: Full thickness tissue loss. Subcutaneous fat may be visible but bone, tendon or muscle are not exposed   Removal Indication and Assessment:    Wound Outcome:    (Retired) Wound Length (cm):    (Retired) Wound Width (cm):    (Retired) Depth (cm):    Wound Description (Comments):    Removal Indications:    Wound Image   06/28/22 1100   Dressing Appearance Moist drainage 06/28/22 1100   Drainage Amount Moderate 06/28/22 1100   Drainage Characteristics/Odor Serosanguineous;Bleeding controlled;No odor 06/28/22 1100   Appearance Pink;Red;Tan;Fibrin;Moist;Not granulating;Adipose 06/28/22 1100   Tissue loss description Full thickness 06/28/22 1100   Wound Length (cm) 2 cm 06/28/22 1100   Wound Width (cm) 4 cm 06/28/22 1100   Wound Depth (cm) 0.5 cm 06/28/22 1100   Wound Volume (cm^3) 4 cm^3 06/28/22 1100   Wound Surface Area (cm^2) 8 cm^2 06/28/22 1100   Care Cleansed with:;Antimicrobial agent 06/28/22 1100   Dressing Applied;Sodium chloride impregnated;Other (comment) 06/28/22 1100         Assessment:     Wound today are improving and is not infected . Wounds were cleaned with Vashe and dressed with gent ointment and Mesalt. Change dressing daily. Offload and elevate and apply Tubigrip E and return in 2 weeks    ICD-10-CM ICD-9-CM   1. Pressure injury of right heel, stage 3  L89.613 707.07     707.23   2. Abscess of calf  L02.419 682.6         Plan:   Tissue pathology and/or culture taken:  [] Yes [x] No   Sharp debridement performed:   [] Yes [x] No   Labs  ordered this visit:   [] Yes [x] No   Imaging ordered this visit:   [] Yes [x] No           Orders Placed This Encounter   Procedures    Change dressing     Cleanse wound with: Vashe  Periwound care: skin prep periwound , allow to dry well  Primary dressing: gentamicin ointment, mesalt  Secondary dressing: gauze, abd prn, kerlix   Offloading: elevate for edema control, keep pressure off posterior calf/heel  Edema control: Tubigrip E  Frequency: Change daily  Follow-up: 2 weeks  Home Health:  Other Orders:        Follow up in about 2 weeks (around 7/12/2022) for md visit.

## 2022-06-28 NOTE — PATIENT INSTRUCTIONS
Cleanse wound with: Vashe  Periwound care: skin prep periwound , allow to dry well  Primary dressing: gentamicin ointment, mesalt  Secondary dressing: gauze, abd prn, kerlix   Offloading: elevate for edema control, keep pressure off posterior calf/heel  Edema control: Tubigrip E  Frequency: Change daily  Follow-up: 2 weeks

## 2022-07-12 ENCOUNTER — HOSPITAL ENCOUNTER (OUTPATIENT)
Dept: WOUND CARE | Facility: HOSPITAL | Age: 57
Discharge: HOME | End: 2022-07-12
Attending: PEDIATRICS
Payer: MEDICARE

## 2022-07-12 VITALS
HEART RATE: 77 BPM | TEMPERATURE: 98 F | SYSTOLIC BLOOD PRESSURE: 94 MMHG | RESPIRATION RATE: 20 BRPM | OXYGEN SATURATION: 96 % | DIASTOLIC BLOOD PRESSURE: 72 MMHG

## 2022-07-12 DIAGNOSIS — L89.613 PRESSURE INJURY OF RIGHT HEEL, STAGE 3: Primary | ICD-10-CM

## 2022-07-12 DIAGNOSIS — L02.419 ABSCESS OF CALF: ICD-10-CM

## 2022-07-12 DIAGNOSIS — L89.023: ICD-10-CM

## 2022-07-12 PROCEDURE — 99213 OFFICE O/P EST LOW 20 MIN: CPT

## 2022-07-12 PROCEDURE — 99214 OFFICE O/P EST MOD 30 MIN: CPT | Mod: ,,, | Performed by: PEDIATRICS

## 2022-07-12 PROCEDURE — 99214 PR OFFICE/OUTPT VISIT, EST, LEVL IV, 30-39 MIN: ICD-10-PCS | Mod: ,,, | Performed by: PEDIATRICS

## 2022-07-12 NOTE — PROGRESS NOTES
Subjective:       Patient ID: Antonio Galvan II is a 56 y.o. male.    Chief Complaint: Pressure Ulcer (Pressure ulcer of R heel, abscess to R posterior lower leg, L posterior elbow reopened over the past week)    HPI  Review of Systems      Objective:      Temp:  [98.3 °F (36.8 °C)]   Pulse:  [77]   Resp:  [20]   BP: (94)/(72)   SpO2:  [96 %]   Physical Exam       Altered Skin Integrity 05/06/22 1140 Right Heel  Full thickness tissue loss. Subcutaneous fat may be visible but bone, tendon or muscle are not exposed (Active)   05/06/22 1140   Altered Skin Integrity Present on Admission:    Side: Right   Orientation:    Location: Heel   Wound Number:    Is this injury device related?: No   Primary Wound Type:    Description of Altered Skin Integrity: Full thickness tissue loss. Subcutaneous fat may be visible but bone, tendon or muscle are not exposed   Removal Indication and Assessment:    Wound Outcome:    (Retired) Wound Length (cm):    (Retired) Wound Width (cm):    (Retired) Depth (cm):    Wound Description (Comments):    Removal Indications:    Wound Image   07/12/22 1100   Description of Altered Skin Integrity Full thickness tissue loss. Subcutaneous fat may be visible but bone, tendon or muscle are not exposed 07/12/22 1100   Dressing Appearance Intact;Moist drainage 07/12/22 1100   Drainage Amount Large 07/12/22 1100   Drainage Characteristics/Odor Sanguineous;Bleeding controlled;Other (see comments) 07/12/22 1100   Appearance Granulating;Bleeding;Other (see comments);Ecchymotic 07/12/22 1100   Tissue loss description Full thickness 07/12/22 1100   Red (%), Wound Tissue Color 90 % 07/12/22 1100   Wound Edges Irregular 07/12/22 1100   Wound Length (cm) 6 cm 07/12/22 1100   Wound Width (cm) 3.2 cm 07/12/22 1100   Wound Depth (cm) 0.1 cm 07/12/22 1100   Wound Volume (cm^3) 1.92 cm^3 07/12/22 1100   Wound Surface Area (cm^2) 19.2 cm^2 07/12/22 1100   Care Cleansed with:;Sterile normal saline;Applied:;Skin  Barrier 07/12/22 1100   Dressing Applied;Sodium chloride impregnated;Gauze;Absorptive Pad;Rolled gauze 07/12/22 1100   Compression Tubular elasticized bandage 07/12/22 1100            Altered Skin Integrity 05/06/22 1147 Right posterior Calf Abscess Full thickness tissue loss. Subcutaneous fat may be visible but bone, tendon or muscle are not exposed (Active)   05/06/22 1147   Altered Skin Integrity Present on Admission:    Side: Right   Orientation: posterior   Location: Calf   Wound Number:    Is this injury device related?: No   Primary Wound Type: Abscess   Description of Altered Skin Integrity: Full thickness tissue loss. Subcutaneous fat may be visible but bone, tendon or muscle are not exposed   Removal Indication and Assessment:    Wound Outcome:    (Retired) Wound Length (cm):    (Retired) Wound Width (cm):    (Retired) Depth (cm):    Wound Description (Comments):    Removal Indications:    Wound Image   07/12/22 1100   Dressing Appearance Intact;Moist drainage 07/12/22 1100   Drainage Amount Moderate 07/12/22 1100   Drainage Characteristics/Odor Serosanguineous;No odor;Bleeding controlled 07/12/22 1100   Appearance Pink;Red;White;Slough;Granulating;Fibrin 07/12/22 1100   Tissue loss description Full thickness 07/12/22 1100   Red (%), Wound Tissue Color 95 % 07/12/22 1100   Periwound Area Intact 07/12/22 1100   Wound Edges Defined 07/12/22 1100   Wound Length (cm) 1.8 cm 07/12/22 1100   Wound Width (cm) 3.8 cm 07/12/22 1100   Wound Depth (cm) 0.6 cm 07/12/22 1100   Wound Volume (cm^3) 4.104 cm^3 07/12/22 1100   Wound Surface Area (cm^2) 6.84 cm^2 07/12/22 1100   Care Cleansed with:;Sterile normal saline 07/12/22 1100   Dressing Applied;Sodium chloride impregnated;Gauze;Island/border 07/12/22 1100            Altered Skin Integrity Left posterior Arm Other (comment) (Active)       Altered Skin Integrity Present on Admission:    Side: Left   Orientation: posterior   Location: Arm   Wound Number:    Is this  injury device related?:    Primary Wound Type: Other   Description of Altered Skin Integrity:    Removal Indication and Assessment:    Wound Outcome: Other   (Retired) Wound Length (cm):    (Retired) Wound Width (cm):    (Retired) Depth (cm):    Wound Description (Comments):    Removal Indications:    Wound Image   07/12/22 1100   Dressing Appearance Intact;Moist drainage 07/12/22 1100   Drainage Amount Small 07/12/22 1100   Drainage Characteristics/Odor Sanguineous;Malodorous;Bleeding controlled 07/12/22 1100   Appearance Red;Not granulating 07/12/22 1100   Red (%), Wound Tissue Color 100 % 07/12/22 1100   Periwound Area Scar tissue;Intact 07/12/22 1100   Wound Edges Defined 07/12/22 1100   Wound Length (cm) 1 cm 07/12/22 1100   Wound Width (cm) 1.5 cm 07/12/22 1100   Wound Depth (cm) 0.1 cm 07/12/22 1100   Wound Volume (cm^3) 0.15 cm^3 07/12/22 1100   Wound Surface Area (cm^2) 1.5 cm^2 07/12/22 1100   Care Cleansed with:;Antimicrobial agent 07/12/22 1100   Dressing Applied;Sodium chloride impregnated;Bandaid 07/12/22 1100   Periwound Care Skin barrier film applied 07/12/22 1100         Assessment:     today right heel ulcer has had some bruising.  This area was cleaned and dry skin removed with scissors.  This area was cleaned and Mesalt was applied and cover bandages were applied.  Calf area ulceration is improving.  This was cleaned and Mesalt was applied. Today the pressure ulcer of the left elbow has recurred.  This is superficial at this time.  The area was cleaned and me salt applied.  Patient will try to offload this area.  Patient need dressings daily and follow up will be in 2 weeks    ICD-10-CM ICD-9-CM   1. Pressure injury of right heel, stage 3  L89.613 707.07     707.23   2. Pressure injury of left elbow, stage 3  L89.023 707.01     707.23   3. Abscess of calf  L02.419 682.6         Plan:   Tissue pathology and/or culture taken:  [] Yes [x] No   Sharp debridement performed:   [] Yes [x] No   Labs  ordered this visit:   [] Yes [x] No   Imaging ordered this visit:   [] Yes [x] No           Orders Placed This Encounter   Procedures    Change dressing     Cleanse wound with: Vashe  Periwound care: skin prep periwound , allow to dry well  Primary dressing: mesalt  Secondary dressing: gauze, abd prn, kerlix to heel, gauze and cover dressing to leg, bandaid to elbow  Offloading: elevate for edema control, keep pressure off posterior calf/heel  Edema control: Tubigrip E  Frequency: Change daily  Follow-up: 2 weeks  Home Health:  Other Orders:     Standing Status:   Standing     Number of Occurrences:   3     Standing Expiration Date:   9/12/2022        Follow up in about 2 weeks (around 7/26/2022) for md visit .

## 2022-07-12 NOTE — PATIENT INSTRUCTIONS
Cleanse wound with: Vashe  Periwound care: skin prep periwound , allow to dry well  Primary dressing: mesalt  Secondary dressing: gauze, abd prn, kerlix to heel, gauze and cover dressing to leg, bandaid to elbow  Offloading: elevate for edema control, keep pressure off posterior calf/heel  Edema control: Tubigrip E  Frequency: Change daily  Follow-up: 2 weeks  Home Health:  Other Orders:

## 2022-07-18 ENCOUNTER — HOSPITAL ENCOUNTER (EMERGENCY)
Facility: HOSPITAL | Age: 57
Discharge: ANOTHER HOSPITAL | End: 2022-07-18
Attending: FAMILY MEDICINE
Payer: MEDICARE

## 2022-07-18 ENCOUNTER — HOSPITAL ENCOUNTER (INPATIENT)
Facility: HOSPITAL | Age: 57
LOS: 4 days | Discharge: HOME | DRG: 871 | End: 2022-07-22
Attending: INTERNAL MEDICINE | Admitting: INTERNAL MEDICINE
Payer: MEDICARE

## 2022-07-18 VITALS
DIASTOLIC BLOOD PRESSURE: 94 MMHG | TEMPERATURE: 97 F | WEIGHT: 240 LBS | HEART RATE: 87 BPM | OXYGEN SATURATION: 98 % | RESPIRATION RATE: 11 BRPM | SYSTOLIC BLOOD PRESSURE: 120 MMHG

## 2022-07-18 DIAGNOSIS — I46.9 CARDIAC ARREST: ICD-10-CM

## 2022-07-18 DIAGNOSIS — R07.9 CHEST PAIN: ICD-10-CM

## 2022-07-18 DIAGNOSIS — I48.92 ATRIAL FLUTTER: ICD-10-CM

## 2022-07-18 DIAGNOSIS — J96.01 ACUTE HYPOXEMIC RESPIRATORY FAILURE: Primary | ICD-10-CM

## 2022-07-18 DIAGNOSIS — I46.9 CARDIAC ARREST: Primary | ICD-10-CM

## 2022-07-18 PROBLEM — R65.20 SEVERE SEPSIS: Status: ACTIVE | Noted: 2022-07-18

## 2022-07-18 PROBLEM — E11.65 UNCONTROLLED TYPE 2 DIABETES MELLITUS WITH HYPERGLYCEMIA: Status: ACTIVE | Noted: 2022-07-18

## 2022-07-18 PROBLEM — K59.00 CONSTIPATION: Status: ACTIVE | Noted: 2022-07-18

## 2022-07-18 PROBLEM — A41.9 SEVERE SEPSIS: Status: ACTIVE | Noted: 2022-07-18

## 2022-07-18 LAB
ALBUMIN SERPL-MCNC: 3.4 GM/DL (ref 3.5–5)
ALBUMIN/GLOB SERPL: 0.8 RATIO (ref 1.1–2)
ALLENS TEST: ABNORMAL
ALP SERPL-CCNC: 146 UNIT/L (ref 40–150)
ALT SERPL-CCNC: 70 UNIT/L (ref 0–55)
ANION GAP SERPL CALC-SCNC: 8 MMOL/L (ref 8–16)
APPEARANCE UR: ABNORMAL
APTT PPP: 26.3 SECONDS (ref 23.2–33.7)
ASCENDING AORTA: 3.14 CM
AST SERPL-CCNC: 69 UNIT/L (ref 5–34)
AV INDEX (PROSTH): 1
AV MEAN GRADIENT: 1 MMHG
AV PEAK GRADIENT: 2 MMHG
AV VALVE AREA: 3.14 CM2
AV VELOCITY RATIO: 0.89
BACTERIA #/AREA URNS AUTO: ABNORMAL /HPF
BASOPHILS # BLD AUTO: 0.04 X10(3)/MCL (ref 0–0.2)
BASOPHILS NFR BLD AUTO: 0.4 %
BILIRUB UR QL STRIP.AUTO: NEGATIVE MG/DL
BILIRUBIN DIRECT+TOT PNL SERPL-MCNC: 0.6 MG/DL
BNP SERPL-MCNC: 89 PG/ML (ref 0–99)
BSA FOR ECHO PROCEDURE: 2.39 M2
BUN SERPL-MCNC: 18.6 MG/DL (ref 8.4–25.7)
BUN SERPL-MCNC: 19 MG/DL (ref 6–20)
CALCIUM SERPL-MCNC: 9.2 MG/DL (ref 8.4–10.2)
CALCIUM SERPL-MCNC: 9.2 MG/DL (ref 8.7–10.5)
CHLORIDE SERPL-SCNC: 102 MMOL/L (ref 95–110)
CHLORIDE SERPL-SCNC: 103 MMOL/L (ref 98–107)
CO2 SERPL-SCNC: 23 MMOL/L (ref 22–29)
CO2 SERPL-SCNC: 24 MMOL/L (ref 23–29)
COLOR UR AUTO: YELLOW
CREAT SERPL-MCNC: 0.9 MG/DL (ref 0.5–1.4)
CREAT SERPL-MCNC: 1.04 MG/DL (ref 0.73–1.18)
CV ECHO LV RWT: 0.53 CM
DELSYS: ABNORMAL
DOP CALC AO PEAK VEL: 0.72 M/S
DOP CALC AO VTI: 11.27 CM
DOP CALC LVOT AREA: 3.1 CM2
DOP CALC LVOT DIAMETER: 2 CM
DOP CALC LVOT PEAK VEL: 0.64 M/S
DOP CALC LVOT STROKE VOLUME: 35.39 CM3
DOP CALCLVOT PEAK VEL VTI: 11.27 CM
ECHO LV POSTERIOR WALL: 1.26 CM (ref 0.6–1.1)
EJECTION FRACTION: 40 %
EOSINOPHIL # BLD AUTO: 0.2 X10(3)/MCL (ref 0–0.9)
EOSINOPHIL NFR BLD AUTO: 1.9 %
EP: 8
ERYTHROCYTE [DISTWIDTH] IN BLOOD BY AUTOMATED COUNT: 18.1 % (ref 11.5–17)
ERYTHROCYTE [SEDIMENTATION RATE] IN BLOOD BY WESTERGREN METHOD: 16 MM/H
EST. GFR  (AFRICAN AMERICAN): >60 ML/MIN/1.73 M^2
EST. GFR  (NON AFRICAN AMERICAN): >60 ML/MIN/1.73 M^2
FIO2: 40
FLUAV AG UPPER RESP QL IA.RAPID: NOT DETECTED
FLUBV AG UPPER RESP QL IA.RAPID: NOT DETECTED
FRACTIONAL SHORTENING: 20 % (ref 28–44)
GLOBULIN SER-MCNC: 4.3 GM/DL (ref 2.4–3.5)
GLUCOSE SERPL-MCNC: 210 MG/DL (ref 74–100)
GLUCOSE SERPL-MCNC: 216 MG/DL (ref 70–110)
GLUCOSE UR QL STRIP.AUTO: 100 MG/DL
HCO3 UR-SCNC: 27.9 MMOL/L (ref 24–28)
HCO3 UR-SCNC: 28.4 MMOL/L (ref 24–28)
HCT VFR BLD AUTO: 43.4 % (ref 42–52)
HGB BLD-MCNC: 12.7 GM/DL (ref 14–18)
IMM GRANULOCYTES # BLD AUTO: 0.11 X10(3)/MCL (ref 0–0.04)
IMM GRANULOCYTES NFR BLD AUTO: 1.1 %
INR BLD: 1.16 (ref 0–1.3)
INTERVENTRICULAR SEPTUM: 1.1 CM (ref 0.6–1.1)
IP: 16
KETONES UR QL STRIP.AUTO: NEGATIVE MG/DL
LA MAJOR: 6.76 CM
LA MINOR: 7.42 CM
LA WIDTH: 4.51 CM
LACTATE SERPL-SCNC: 4 MMOL/L (ref 0.5–2.2)
LEFT ATRIUM SIZE: 3.67 CM
LEFT ATRIUM VOLUME INDEX: 43.3 ML/M2
LEFT ATRIUM VOLUME: 99.53 CM3
LEFT INTERNAL DIMENSION IN SYSTOLE: 3.82 CM (ref 2.1–4)
LEFT VENTRICLE DIASTOLIC VOLUME INDEX: 45.76 ML/M2
LEFT VENTRICLE DIASTOLIC VOLUME: 105.24 ML
LEFT VENTRICLE MASS INDEX: 92 G/M2
LEFT VENTRICLE SYSTOLIC VOLUME INDEX: 27.4 ML/M2
LEFT VENTRICLE SYSTOLIC VOLUME: 62.92 ML
LEFT VENTRICULAR INTERNAL DIMENSION IN DIASTOLE: 4.76 CM (ref 3.5–6)
LEFT VENTRICULAR MASS: 211.17 G
LEUKOCYTE ESTERASE UR QL STRIP.AUTO: ABNORMAL UNIT/L
LYMPHOCYTES # BLD AUTO: 1.61 X10(3)/MCL (ref 0.6–4.6)
LYMPHOCYTES NFR BLD AUTO: 15.5 %
MAGNESIUM SERPL-MCNC: 2.1 MG/DL (ref 1.6–2.6)
MCH RBC QN AUTO: 24.9 PG (ref 27–31)
MCHC RBC AUTO-ENTMCNC: 29.3 MG/DL (ref 33–36)
MCV RBC AUTO: 85.1 FL (ref 80–94)
MODE: ABNORMAL
MONOCYTES # BLD AUTO: 0.48 X10(3)/MCL (ref 0.1–1.3)
MONOCYTES NFR BLD AUTO: 4.6 %
NEUTROPHILS # BLD AUTO: 8 X10(3)/MCL (ref 2.1–9.2)
NEUTROPHILS NFR BLD AUTO: 76.5 %
NITRITE UR QL STRIP.AUTO: POSITIVE
PCO2 BLDA: 54.2 MMHG (ref 35–45)
PCO2 BLDA: 56 MMHG (ref 35–45)
PH SMN: 7.31 [PH] (ref 7.35–7.45)
PH SMN: 7.33 [PH] (ref 7.35–7.45)
PH UR STRIP.AUTO: 8.5 [PH]
PHOSPHATE SERPL-MCNC: 4.7 MG/DL (ref 2.3–4.7)
PLATELET # BLD AUTO: 254 X10(3)/MCL (ref 130–400)
PMV BLD AUTO: 10.2 FL (ref 7.4–10.4)
PO2 BLDA: 148 MMHG (ref 80–100)
PO2 BLDA: 275 MMHG (ref 80–100)
POC BE: 2 MMOL/L
POC BE: 2 MMOL/L
POC SATURATED O2: 100 % (ref 95–100)
POC SATURATED O2: 99 % (ref 95–100)
POC TCO2: 30 MMOL/L (ref 23–27)
POC TCO2: 30 MMOL/L (ref 23–27)
POCT GLUCOSE: 161 MG/DL (ref 70–110)
POCT GLUCOSE: 176 MG/DL (ref 70–110)
POCT GLUCOSE: 194 MG/DL (ref 70–110)
POTASSIUM SERPL-SCNC: 4.5 MMOL/L (ref 3.5–5.1)
POTASSIUM SERPL-SCNC: 5.5 MMOL/L (ref 3.5–5.1)
PROT SERPL-MCNC: 7.7 GM/DL (ref 6.4–8.3)
PROT UR QL STRIP.AUTO: >=300 MG/DL
PROTHROMBIN TIME: 11.6 SECONDS (ref 12.5–14.5)
RA MAJOR: 5.99 CM
RA WIDTH: 4.03 CM
RBC # BLD AUTO: 5.1 X10(6)/MCL (ref 4.7–6.1)
RBC #/AREA URNS AUTO: ABNORMAL /HPF
RBC UR QL AUTO: ABNORMAL UNIT/L
RIGHT VENTRICULAR END-DIASTOLIC DIMENSION: 4.03 CM
SAMPLE: ABNORMAL
SAMPLE: ABNORMAL
SARS-COV-2 RNA RESP QL NAA+PROBE: NOT DETECTED
SINUS: 2.82 CM
SITE: ABNORMAL
SODIUM SERPL-SCNC: 134 MMOL/L (ref 136–145)
SODIUM SERPL-SCNC: 137 MMOL/L (ref 136–145)
SP GR UR STRIP.AUTO: 1.02
SQUAMOUS #/AREA URNS AUTO: ABNORMAL /HPF
STJ: 2.99 CM
TDI LATERAL: 0.07 M/S
TDI SEPTAL: 0.07 M/S
TDI: 0.07 M/S
TRI-PHOS CRY URNS QL MICRO: ABNORMAL /HPF
TROPONIN I SERPL DL<=0.01 NG/ML-MCNC: 0.05 NG/ML (ref 0–0.03)
UROBILINOGEN UR STRIP-ACNC: 0.2 MG/DL
WBC # SPEC AUTO: 10.4 X10(3)/MCL (ref 4.5–11.5)
WBC #/AREA URNS AUTO: ABNORMAL /HPF

## 2022-07-18 PROCEDURE — 94761 N-INVAS EAR/PLS OXIMETRY MLT: CPT

## 2022-07-18 PROCEDURE — 87636 SARSCOV2 & INF A&B AMP PRB: CPT | Performed by: FAMILY MEDICINE

## 2022-07-18 PROCEDURE — 93010 EKG 12-LEAD: ICD-10-PCS | Mod: ,,, | Performed by: INTERNAL MEDICINE

## 2022-07-18 PROCEDURE — 81001 URINALYSIS AUTO W/SCOPE: CPT | Performed by: FAMILY MEDICINE

## 2022-07-18 PROCEDURE — 36415 COLL VENOUS BLD VENIPUNCTURE: CPT | Performed by: FAMILY MEDICINE

## 2022-07-18 PROCEDURE — 83880 ASSAY OF NATRIURETIC PEPTIDE: CPT | Performed by: NURSE PRACTITIONER

## 2022-07-18 PROCEDURE — 83735 ASSAY OF MAGNESIUM: CPT | Performed by: FAMILY MEDICINE

## 2022-07-18 PROCEDURE — 84484 ASSAY OF TROPONIN QUANT: CPT | Performed by: NURSE PRACTITIONER

## 2022-07-18 PROCEDURE — 27000190 HC CPAP FULL FACE MASK W/VALVE

## 2022-07-18 PROCEDURE — 84100 ASSAY OF PHOSPHORUS: CPT | Performed by: FAMILY MEDICINE

## 2022-07-18 PROCEDURE — 94002 VENT MGMT INPAT INIT DAY: CPT

## 2022-07-18 PROCEDURE — 96361 HYDRATE IV INFUSION ADD-ON: CPT

## 2022-07-18 PROCEDURE — 99900035 HC TECH TIME PER 15 MIN (STAT)

## 2022-07-18 PROCEDURE — 93041 RHYTHM ECG TRACING: CPT

## 2022-07-18 PROCEDURE — 25000003 PHARM REV CODE 250: Performed by: INTERNAL MEDICINE

## 2022-07-18 PROCEDURE — 94660 CPAP INITIATION&MGMT: CPT

## 2022-07-18 PROCEDURE — 93010 ELECTROCARDIOGRAM REPORT: CPT | Mod: ,,, | Performed by: INTERNAL MEDICINE

## 2022-07-18 PROCEDURE — 93005 ELECTROCARDIOGRAM TRACING: CPT

## 2022-07-18 PROCEDURE — 25000003 PHARM REV CODE 250: Performed by: FAMILY MEDICINE

## 2022-07-18 PROCEDURE — 20000000 HC ICU ROOM

## 2022-07-18 PROCEDURE — 85730 THROMBOPLASTIN TIME PARTIAL: CPT | Performed by: FAMILY MEDICINE

## 2022-07-18 PROCEDURE — 25500020 PHARM REV CODE 255: Performed by: INTERNAL MEDICINE

## 2022-07-18 PROCEDURE — 99285 EMERGENCY DEPT VISIT HI MDM: CPT | Mod: 25

## 2022-07-18 PROCEDURE — 96375 TX/PRO/DX INJ NEW DRUG ADDON: CPT

## 2022-07-18 PROCEDURE — 82803 BLOOD GASES ANY COMBINATION: CPT

## 2022-07-18 PROCEDURE — 25000003 PHARM REV CODE 250: Performed by: NURSE PRACTITIONER

## 2022-07-18 PROCEDURE — 96374 THER/PROPH/DIAG INJ IV PUSH: CPT

## 2022-07-18 PROCEDURE — 80053 COMPREHEN METABOLIC PANEL: CPT | Performed by: FAMILY MEDICINE

## 2022-07-18 PROCEDURE — 63600175 PHARM REV CODE 636 W HCPCS

## 2022-07-18 PROCEDURE — 83605 ASSAY OF LACTIC ACID: CPT | Performed by: FAMILY MEDICINE

## 2022-07-18 PROCEDURE — 85025 COMPLETE CBC W/AUTO DIFF WBC: CPT | Performed by: FAMILY MEDICINE

## 2022-07-18 PROCEDURE — 85610 PROTHROMBIN TIME: CPT | Performed by: FAMILY MEDICINE

## 2022-07-18 PROCEDURE — 27200966 HC CLOSED SUCTION SYSTEM

## 2022-07-18 PROCEDURE — 36600 WITHDRAWAL OF ARTERIAL BLOOD: CPT

## 2022-07-18 PROCEDURE — 27000221 HC OXYGEN, UP TO 24 HOURS

## 2022-07-18 PROCEDURE — 63600175 PHARM REV CODE 636 W HCPCS: Performed by: INTERNAL MEDICINE

## 2022-07-18 PROCEDURE — 87040 BLOOD CULTURE FOR BACTERIA: CPT | Performed by: NURSE PRACTITIONER

## 2022-07-18 PROCEDURE — 63600175 PHARM REV CODE 636 W HCPCS: Performed by: NURSE PRACTITIONER

## 2022-07-18 PROCEDURE — 63600175 PHARM REV CODE 636 W HCPCS: Performed by: FAMILY MEDICINE

## 2022-07-18 PROCEDURE — 99291 PR CRITICAL CARE, E/M 30-74 MINUTES: ICD-10-PCS | Mod: ,,, | Performed by: NURSE PRACTITIONER

## 2022-07-18 PROCEDURE — 99291 CRITICAL CARE FIRST HOUR: CPT | Mod: ,,, | Performed by: NURSE PRACTITIONER

## 2022-07-18 PROCEDURE — 80048 BASIC METABOLIC PNL TOTAL CA: CPT | Mod: XB | Performed by: NURSE PRACTITIONER

## 2022-07-18 RX ORDER — ONDANSETRON 2 MG/ML
4 INJECTION INTRAMUSCULAR; INTRAVENOUS EVERY 8 HOURS PRN
Status: DISCONTINUED | OUTPATIENT
Start: 2022-07-18 | End: 2022-07-22 | Stop reason: HOSPADM

## 2022-07-18 RX ORDER — PROPOFOL 10 MG/ML
INJECTION, EMULSION INTRAVENOUS
Status: COMPLETED
Start: 2022-07-18 | End: 2022-07-18

## 2022-07-18 RX ORDER — MIDAZOLAM HYDROCHLORIDE 1 MG/ML
2 INJECTION INTRAMUSCULAR; INTRAVENOUS
Status: COMPLETED | OUTPATIENT
Start: 2022-07-18 | End: 2022-07-18

## 2022-07-18 RX ORDER — MIDAZOLAM HYDROCHLORIDE 1 MG/ML
INJECTION INTRAMUSCULAR; INTRAVENOUS
Status: DISPENSED
Start: 2022-07-18 | End: 2022-07-18

## 2022-07-18 RX ORDER — SODIUM CHLORIDE FOR INHALATION 3 %
4 VIAL, NEBULIZER (ML) INHALATION EVERY 4 HOURS PRN
Status: DISCONTINUED | OUTPATIENT
Start: 2022-07-18 | End: 2022-07-22 | Stop reason: HOSPADM

## 2022-07-18 RX ORDER — ACETAMINOPHEN 500 MG
1000 TABLET ORAL EVERY 6 HOURS PRN
Status: DISCONTINUED | OUTPATIENT
Start: 2022-07-18 | End: 2022-07-22 | Stop reason: HOSPADM

## 2022-07-18 RX ORDER — ENOXAPARIN SODIUM 100 MG/ML
40 INJECTION SUBCUTANEOUS EVERY 12 HOURS
Status: DISCONTINUED | OUTPATIENT
Start: 2022-07-18 | End: 2022-07-20

## 2022-07-18 RX ORDER — SUCCINYLCHOLINE CHLORIDE 20 MG/ML
100 INJECTION INTRAMUSCULAR; INTRAVENOUS
Status: COMPLETED | OUTPATIENT
Start: 2022-07-18 | End: 2022-07-18

## 2022-07-18 RX ORDER — SODIUM CHLORIDE 0.9 % (FLUSH) 0.9 %
10 SYRINGE (ML) INJECTION
Status: DISCONTINUED | OUTPATIENT
Start: 2022-07-18 | End: 2022-07-22 | Stop reason: HOSPADM

## 2022-07-18 RX ORDER — KETOROLAC TROMETHAMINE 30 MG/ML
15 INJECTION, SOLUTION INTRAMUSCULAR; INTRAVENOUS
Status: COMPLETED | OUTPATIENT
Start: 2022-07-18 | End: 2022-07-18

## 2022-07-18 RX ORDER — MIDAZOLAM HYDROCHLORIDE 1 MG/ML
INJECTION INTRAMUSCULAR; INTRAVENOUS
Status: COMPLETED
Start: 2022-07-18 | End: 2022-07-18

## 2022-07-18 RX ORDER — LORAZEPAM 2 MG/ML
INJECTION INTRAMUSCULAR
Status: DISCONTINUED
Start: 2022-07-18 | End: 2022-07-18 | Stop reason: WASHOUT

## 2022-07-18 RX ORDER — INSULIN ASPART 100 [IU]/ML
1-10 INJECTION, SOLUTION INTRAVENOUS; SUBCUTANEOUS EVERY 6 HOURS PRN
Status: DISCONTINUED | OUTPATIENT
Start: 2022-07-18 | End: 2022-07-20

## 2022-07-18 RX ORDER — GLUCAGON 1 MG
1 KIT INJECTION
Status: DISCONTINUED | OUTPATIENT
Start: 2022-07-18 | End: 2022-07-20

## 2022-07-18 RX ORDER — SUCCINYLCHOLINE CHLORIDE 20 MG/ML
INJECTION INTRAMUSCULAR; INTRAVENOUS
Status: DISPENSED
Start: 2022-07-18 | End: 2022-07-18

## 2022-07-18 RX ADMIN — ACETAMINOPHEN 1000 MG: 500 TABLET ORAL at 06:07

## 2022-07-18 RX ADMIN — MIDAZOLAM HYDROCHLORIDE 4 MG: 1 INJECTION, SOLUTION INTRAMUSCULAR; INTRAVENOUS at 02:07

## 2022-07-18 RX ADMIN — KETOROLAC TROMETHAMINE 15 MG: 30 INJECTION, SOLUTION INTRAMUSCULAR; INTRAVENOUS at 05:07

## 2022-07-18 RX ADMIN — HUMAN ALBUMIN MICROSPHERES AND PERFLUTREN 0.66 MG: 10; .22 INJECTION, SOLUTION INTRAVENOUS at 02:07

## 2022-07-18 RX ADMIN — ENOXAPARIN SODIUM 40 MG: 100 INJECTION SUBCUTANEOUS at 10:07

## 2022-07-18 RX ADMIN — MEROPENEM 2 G: 1 INJECTION INTRAVENOUS at 01:07

## 2022-07-18 RX ADMIN — MIDAZOLAM HYDROCHLORIDE 2 MG: 1 INJECTION, SOLUTION INTRAMUSCULAR; INTRAVENOUS at 01:07

## 2022-07-18 RX ADMIN — MIDAZOLAM HYDROCHLORIDE 2 MG: 1 INJECTION, SOLUTION INTRAMUSCULAR; INTRAVENOUS at 02:07

## 2022-07-18 RX ADMIN — SUCCINYLCHOLINE CHLORIDE 100 MG: 20 INJECTION, SOLUTION INTRAMUSCULAR; INTRAVENOUS at 01:07

## 2022-07-18 RX ADMIN — Medication 1 ENEMA: at 05:07

## 2022-07-18 RX ADMIN — VANCOMYCIN HYDROCHLORIDE 2000 MG: 500 INJECTION, POWDER, LYOPHILIZED, FOR SOLUTION INTRAVENOUS at 11:07

## 2022-07-18 RX ADMIN — INSULIN ASPART 1 UNITS: 100 INJECTION, SOLUTION INTRAVENOUS; SUBCUTANEOUS at 11:07

## 2022-07-18 RX ADMIN — SODIUM CHLORIDE 1000 ML: 9 INJECTION, SOLUTION INTRAVENOUS at 02:07

## 2022-07-18 RX ADMIN — PROPOFOL 1000 MG: 10 INJECTION, EMULSION INTRAVENOUS at 03:07

## 2022-07-18 RX ADMIN — VANCOMYCIN HYDROCHLORIDE 1500 MG: 1.5 INJECTION, POWDER, LYOPHILIZED, FOR SOLUTION INTRAVENOUS at 10:07

## 2022-07-18 RX ADMIN — ENOXAPARIN SODIUM 40 MG: 100 INJECTION SUBCUTANEOUS at 08:07

## 2022-07-18 RX ADMIN — MEROPENEM 2 G: 1 INJECTION INTRAVENOUS at 05:07

## 2022-07-18 NOTE — NURSING
In bed resting quietly with eyes closed, arouses easily. No c/o pain or discomfort, breathing even and unlabored. Primary team at bedside conversing with girlfriend.
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
## 2022-07-18 NOTE — CONSULTS
Agree w/ broad spectrum antibiotics in setting of cardiac arrest of undetermined etiology  No H&P available at this time  Recommend infectious work up by ICU team  Call back if needed   21-Aug-2019 02:45

## 2022-07-18 NOTE — PROGRESS NOTES
Pharmacokinetic Initial Assessment: IV Vancomycin    Assessment/Plan:    Initiate intravenous vancomycin with loading dose of 2,000 mg once followed by a maintenance dose of vancomycin 1500mg IV every 12 hours  Desired empiric serum trough concentration is 10 to 20 mcg/mL  Draw vancomycin trough level 60 min prior to fifth dose on 7/20 at approximately 1000  Pharmacy will continue to follow and monitor vancomycin.      Please contact pharmacy at extension [phone] with any questions regarding this assessment.     Thank you for the consult,   Anusha Felix       Patient brief summary:  Antonio Galvan II is a 56 y.o. male initiated on antimicrobial therapy with IV Vancomycin for treatment of suspected bacteremia    Drug Allergies:   Review of patient's allergies indicates:  No Known Allergies    Actual Body Weight:   119.7 kg     Renal Function:   Estimated Creatinine Clearance: 102.1 mL/min (based on SCr of 1.04 mg/dL).,     Dialysis Method (if applicable):  N/A    CBC (last 72 hours):  Recent Labs   Lab Result Units 07/18/22  0229   WBC x10(3)/mcL 10.4   Hgb gm/dL 12.7*   Hct % 43.4   Platelet x10(3)/mcL 254   Mono % % 4.6   Eos % % 1.9   Basophil % % 0.4       Metabolic Panel (last 72 hours):  Recent Labs   Lab Result Units 07/18/22  0208 07/18/22  0229   Sodium Level mmol/L  --  137   Potassium Level mmol/L  --  5.5*   Chloride mmol/L  --  103   Carbon Dioxide mmol/L  --  23   Glucose Level mg/dL  --  210*   Glucose, UA mg/dL 100 *  --    Blood Urea Nitrogen mg/dL  --  18.6   Creatinine mg/dL  --  1.04   Albumin Level gm/dL  --  3.4*   Bilirubin Total mg/dL  --  0.6   Alkaline Phosphatase unit/L  --  146   Aspartate Aminotransferase unit/L  --  69*   Alanine Aminotransferase unit/L  --  70*   Magnesium Level mg/dL  --  2.10   Phosphorus Level mg/dL  --  4.7       Drug levels (last 3 results):  No results for input(s): VANCOMYCINRA, VANCORANDOM, VANCOMYCINPE, VANCOPEAK, VANCOMYCINTR, VANCOTROUGH in the last 72  hours.    Microbiologic Results:  Microbiology Results (last 7 days)     Procedure Component Value Units Date/Time    Blood culture [[id number]] Collected: 07/18/22 1043    Order Status: Sent Specimen: Blood from Peripheral, Upper Arm, Right Updated: 07/18/22 1149    Blood culture [[id number]] Collected: 07/18/22 1135    Order Status: Sent Specimen: Blood from Peripheral, Hand, Right Updated: 07/18/22 1146

## 2022-07-18 NOTE — ASSESSMENT & PLAN NOTE
Home regimen: 100u Lantus QHS, Novolin R with meals    -- q6h accu checks with [facility]   -- Inpatient goal 140-180  -- Follow up A1C in AM   -- Diabetic diet once tolerating PO

## 2022-07-18 NOTE — PROVIDER TRANSFER
(Physician in Lead of Transfers)   Outside Transfer Acceptance Note / Regional Referral Center    Referring facility: [facility]   Referring provider: MARLYN CARRASCO  Accepting facility: [facility]  Reason for transfer:  Higher level of care  Transfer diagnosis: respiratory failure   Transfer specialty requested: Critical Care Medicine  Transfer specialty notified: Yes  Transfer level: 2  Isolation status: Airborne and Contact and Droplet   Admission class or status: IP- Inpatient      Narrative     Patient is a 56 y.o. male who has a past medical history of Chronic skin ulcer, DM (diabetes mellitus), Infected decubitus ulcer, Lymphedema of leg, Neurogenic bladder, Obesity, unspecified, Open wound of abdomen, Pressure ulcer of heel, Pressure ulcer of right foot, stage 3, Pressure ulcer of right ischium, Quadriplegia, and Quadriplegic spinal paralysis presented with to [facility] with cardiac arrest. Patient was brought in to ED by EMS after being found unresponsive. Patient was intubated and CPR was initiated. They were able to achieve ROSC after 2 rounds of CPR. Patient then woke up and was responsive. He was then extubated and placed on BIPAP. On BIPAP patient is stable and satting 100% on 50% FIO2. See below labs. Chest xray with no acute process. Facility seeking transfer for higher level of care. Patient deemed stable for transfer.     Objective     Vitals: Pulse: 90 (07/18/22 0335)  Resp: 15 (07/18/22 0335)  BP: (!) 127/93 (07/18/22 0335)  SpO2: 98 % (07/18/22 0335)    Recent Labs: see EPIC  CBC:  Recent Labs   Lab 07/18/22 0229   WBC 10.4   HGB 12.7*   HCT 43.4        CMP:  Recent Labs   Lab 07/18/22 0229   GLUCOSE 210*   CALCIUM 9.2   ALBUMIN 3.4*      K 5.5*   CO2 23   BUN 18.6   CREATININE 1.04   ALKPHOS 146   ALT 70*   AST 69*   BILITOT 0.6     No results for input(s): LACTATE in the last 72 hours.  No results for input(s): CPK, CPKMB, TROPONINI, MB  in the last 168 hours.  BNP  No results for input(s): BNP, BNPTRIAGEBLO in the last 168 hours.  ABG  No results for input(s): PH, PO2, PCO2, HCO3, BE in the last 168 hours.   Lab Results   Component Value Date    INR 1.16 07/18/2022    PROTIME 11.6 (L) 07/18/2022       Recent imaging:   X-Ray Abdomen AP 1 View  Indication: Neurogenic bladder     FINDINGS: The pattern of ligamentous calcification along the lower  thoracic and lumbar spine is suggestive of a history of ankylosing  spondylitis. Severe joint space narrowing and exuberant calcification  is also noted at the right hip. No abnormal abdominal calcifications  are appreciated. No bowel distention, free air or abnormal mass effect  is identified. Moderately increased stool is noted in the colon.     IMPRESSION:  1. Radiographically benign abdomen.  2. Bone abdomen allergies described above in the lumbosacral spine and  right hip.         Electronically Signed By: Demarcus Boogie MD  Date/Time Signed: 08/16/2016 13:52  US Retroperitoneal Complete  US Retroperitoneum Complete     Indication: Neuromuscular dysfunction of the bladder.     Technique: Multiplanar ultrasound imaging of the retroperitoneum was  performed with real-time B-mode grayscale imaging and color-flow  Doppler as needed.     Comparison: 7/22/2015.     Findings: The imaged portions of the aorta and IVC are unremarkable.     The right kidney measures 12.2 cm and demonstrates normal echogenicity  with no hydronephrosis or calculi.     The left kidney measures 12.6 cm and demonstrates normal echogenicity  with no hydronephrosis or calculi.     The bladder is decompressed with a Waters catheter in place which  limits evaluation.     Impression:  1. The bladder is decompressed with a Waters catheter in place which  limits evaluation.  2. Normal sonographic appearance of the kidneys.         Electronically Signed By: Jhonatan Guo MD  Date/Time Signed: 08/16/2016 13:06      Airway:     Vent  settings: Vent Mode: CPAP / PSV  Oxygen Concentration (%):  [] 50  Resp Rate Total:  [20 br/min-26 br/min] 20 br/min  Vt Set:  [550 mL] 550 mL  PEEP/CPAP:  [5 cmH20-8 cmH20] 5 cmH20  Pressure Support:  [10 cmH20] 10 cmH20  Mean Airway Pressure:  [18 cmH20] 18 cmH20   IV access:        Peripheral IV - Single Lumen 07/18/22 0215 22 G Left Wrist (Active)     Allergies: Review of patient's allergies indicates:  No Known Allergies   NPO: Yes      Anticoagulation:   Anticoagulants     None           Instructions      Admit to ICU  Management per ICU team

## 2022-07-18 NOTE — PLAN OF CARE
CMICU DAILY GOALS       A: Awake    RASS: Goal -    Actual -     Restraint necessity:    B: Breathe   SBT: NA   C: Coordinate A & B, analgesics/sedatives   Pain: managed    SAT: NA  D: Delirium   CAM-ICU: Overall CAM-ICU: Negative  E: Early(intubated/ Progressive (non-intubated) Mobility   MOVE Screen:  NA   Activity: Activity Management: Patient unable to perform activities  FAS: Feeding/Nutrition   Diet order:  ,    T: Thrombus   DVT prophylaxis:    H: HOB Elevation      U: Ulcer Prophylaxis   GI: no  G: Glucose control   managed    S: Skin               Skin Protection: incontinence pads utilized  B: Bowel Function   no issues   I: Indwelling Catheters   Waters necessity:     CVC necessity: No  D: De-escalation Antibiotics   Yes    Family/Goals of care/Code Status   Code Status: Full Code    24H Vital Sign Range  Temp:  [97 °F (36.1 °C)-98.9 °F (37.2 °C)]   Pulse:  []   Resp:  [10-21]   BP: (104-199)/()   SpO2:  [86 %-100 %]      Shift Events   Able to transition from continuous Bipap to NC. Family at bedside.    VS and assessment per flow sheet, patient progressing towards goals as tolerated, plan of care reviewed with family, all concerns addressed, will continue to monitor.    Nat Clark
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
## 2022-07-18 NOTE — ASSESSMENT & PLAN NOTE
Patient with Hypoxic Respiratory failure which is Acute.  he is not on home oxygen.     Supplemental oxygen was provided and noted- Vent Mode: CPAP / PSV  Oxygen Concentration (%):  [] 50  Resp Rate Total:  [20 br/min-26 br/min] 20 br/min  Vt Set:  [550 mL] 550 mL  PEEP/CPAP:  [5 cmH20-8 cmH20] 5 cmH20  Pressure Support:  [10 cmH20] 10 cmH20  Mean Airway Pressure:  [18 cmH20] 18 cmH20.     Signs/symptoms of respiratory failure include- tachypnea, increased work of breathing and respiratory distress. Contributing diagnoses includes - Aspiration and mucous plug Labs and images were reviewed. Patient Has recent ABG, which has been reviewed. Will treat underlying causes and adjust management of respiratory failure     -- Wean oxygen for SpO2 > 90%   -- Cough assist BID   -- Aggressive pulm toileting in setting of quadriplegia   -- Continue BSA and infectious work up

## 2022-07-18 NOTE — SUBJECTIVE & OBJECTIVE
Past Medical History:   Diagnosis Date    Chronic skin ulcer     DM (diabetes mellitus)     Infected decubitus ulcer     Lymphedema of leg     Neurogenic bladder     Obesity, unspecified     Open wound of abdomen     Pressure ulcer of heel     Pressure ulcer of right foot, stage 3     Pressure ulcer of right ischium     Quadriplegia     Quadriplegic spinal paralysis        No past surgical history on file.    Review of patient's allergies indicates:  No Known Allergies    Family History    None       Tobacco Use    Smoking status: Never Smoker    Smokeless tobacco: Not on file   Substance and Sexual Activity    Alcohol use: Not on file    Drug use: Not on file    Sexual activity: Not on file      Review of Systems   Unable to perform ROS: Acuity of condition (Continuous BiPap)   Objective:     Vital Signs (Most Recent):  Temp: 97.9 °F (36.6 °C) (07/18/22 0900) Vital Signs (24h Range):  Temp:  [97 °F (36.1 °C)-97.9 °F (36.6 °C)] 97.9 °F (36.6 °C)  Pulse:  [] 87  Resp:  [11-21] 11  SpO2:  [86 %-100 %] 98 %  BP: (104-199)/() 120/94      There is no height or weight on file to calculate BMI.    No intake or output data in the 24 hours ending 07/18/22 1029    Physical Exam  Vitals and nursing note reviewed.   Constitutional:       General: He is not in acute distress.     Appearance: He is obese. He is ill-appearing and diaphoretic.   HENT:      Head: Normocephalic and atraumatic.      Mouth/Throat:      Mouth: Mucous membranes are dry.      Pharynx: No oropharyngeal exudate.   Eyes:      General: No scleral icterus.     Pupils: Pupils are equal, round, and reactive to light.      Comments: Injected sclera   Cardiovascular:      Rate and Rhythm: Regular rhythm. Tachycardia present.      Heart sounds: No murmur heard.    No gallop.   Pulmonary:      Effort: No respiratory distress.      Breath sounds: Decreased air movement present. No wheezing.      Comments: Continuous BiPap  Abdominal:      General: Bowel  sounds are normal. There is distension.      Palpations: Abdomen is soft.      Tenderness: There is no abdominal tenderness.   Musculoskeletal:      Right lower leg: Edema present.      Left lower leg: Edema present.   Neurological:      Mental Status: He is lethargic.      GCS: GCS eye subscore is 3. GCS verbal subscore is 5. GCS motor subscore is 6.       Vents:  Oxygen Concentration (%): 50 (07/18/22 0900)  Lines/Drains/Airways       Intraosseous Line  Duration                  Intraosseous Line 07/18/22 0136 Tibia <1 day              Peripheral Intravenous Line  Duration                  Peripheral IV - Single Lumen 07/18/22 0215 22 G Left Wrist <1 day                  Significant Labs:    CBC/Anemia Profile:  Recent Labs   Lab 07/18/22 0229   WBC 10.4   HGB 12.7*   HCT 43.4      MCV 85.1   RDW 18.1*        Chemistries:  Recent Labs   Lab 07/18/22 0229      K 5.5*   CO2 23   BUN 18.6   CREATININE 1.04   CALCIUM 9.2   ALBUMIN 3.4*   BILITOT 0.6   ALKPHOS 146   ALT 70*   AST 69*   GLUCOSE 210*   MG 2.10       All pertinent labs within the past 24 hours have been reviewed.    Significant Imaging: I have reviewed all pertinent imaging results/findings within the past 24 hours.

## 2022-07-18 NOTE — ED NOTES
Pt going to [facility] on [facility] to room 6095;  [facility] setting up [facility] for transfer

## 2022-07-18 NOTE — ED NOTES
Patient's mother reports that patient requires bowel regimen on MWF and to monitor his urine output due to his quadriplegic status causes dysreflexia

## 2022-07-18 NOTE — ASSESSMENT & PLAN NOTE
"This patient does have evidence of infective focus  My overall impression is sepsis. Vital signs were reviewed and noted in progress note.  Antibiotics given-   Antibiotics (From admission, onward)            Start     Stop Route Frequency Ordered    07/18/22 1045  meropenem (MERREM) 2 g in sodium chloride 0.9% 100 mL IVPB         -- IV Every 8 hours (non-standard times) 07/18/22 0934    07/18/22 1045  vancomycin 2 g in dextrose 5 % 500 mL IVPB         -- IV Once 07/18/22 0938    07/18/22 1028  vancomycin - pharmacy to dose  (vancomycin IVPB)        "And" Linked Group Details    -- IV pharmacy to manage frequency 07/18/22 0934        Cultures were taken-   Microbiology Results (last 7 days)     Procedure Component Value Units Date/Time    Blood culture [[id number]] Collected: 07/18/22 1043    Order Status: Sent Specimen: Blood from Peripheral, Upper Arm, Right Updated: 07/18/22 1149    Blood culture [[id number]] Collected: 07/18/22 1135    Order Status: Sent Specimen: Blood from Peripheral, Hand, Right Updated: 07/18/22 1149        Latest lactate reviewed, they are-  No results for input(s): LACTATE in the last 72 hours.    Organ dysfunction indicated by Acute respiratory failure  Source- urine vs PNA    Previous MRSA, Serratia, and Proteus infections     -- Continue Vanc / Meropenem   -- ID consulted   -- Pharm adjusted Vanc. Appreciate assistance   -- Follow up urine culture. Supra pubic sandhu changed. Active infection versus colonization   -- Follow up blood cultures   -- AM lactic acid   -- Wound care consulted. Appreciate assistance       "

## 2022-07-18 NOTE — ASSESSMENT & PLAN NOTE
56 year old male presented to OSH s/p cardiac arrest with ROSC achieved after 2 mins per EMS report. Respiratory etiology (aspiration vs mucous plug) likely. Patient is awake, alert, and oriented after arrest. Weaned from BiPap after arrival    -- No indication for TTM at this time   -- Wean oxygen for Spo2 > 90%   -- NIPPV PRN   -- Follow up ECHO cardiogram   -- EKG with atrial flutter. Rate controlled at this time   -- Continuous telemetry   -- Follow up troponin and BNP   -- Broad spectrum abx and infectious work up

## 2022-07-18 NOTE — ASSESSMENT & PLAN NOTE
-- Continuous telemetry   -- EKG for acute changes   -- Resume home toprol once able to tolerate PO

## 2022-07-19 LAB
ALBUMIN SERPL BCP-MCNC: 2.9 G/DL (ref 3.5–5.2)
ALP SERPL-CCNC: 112 U/L (ref 55–135)
ALT SERPL W/O P-5'-P-CCNC: 43 U/L (ref 10–44)
ANION GAP SERPL CALC-SCNC: 7 MMOL/L (ref 8–16)
AST SERPL-CCNC: 16 U/L (ref 10–40)
BASOPHILS # BLD AUTO: 0.01 K/UL (ref 0–0.2)
BASOPHILS NFR BLD: 0.1 % (ref 0–1.9)
BILIRUB SERPL-MCNC: 0.8 MG/DL (ref 0.1–1)
BUN SERPL-MCNC: 17 MG/DL (ref 6–20)
CALCIUM SERPL-MCNC: 8.7 MG/DL (ref 8.7–10.5)
CHLORIDE SERPL-SCNC: 103 MMOL/L (ref 95–110)
CO2 SERPL-SCNC: 24 MMOL/L (ref 23–29)
CREAT SERPL-MCNC: 0.8 MG/DL (ref 0.5–1.4)
DIFFERENTIAL METHOD: ABNORMAL
EOSINOPHIL # BLD AUTO: 0.2 K/UL (ref 0–0.5)
EOSINOPHIL NFR BLD: 2.4 % (ref 0–8)
ERYTHROCYTE [DISTWIDTH] IN BLOOD BY AUTOMATED COUNT: 18 % (ref 11.5–14.5)
EST. GFR  (AFRICAN AMERICAN): >60 ML/MIN/1.73 M^2
EST. GFR  (NON AFRICAN AMERICAN): >60 ML/MIN/1.73 M^2
ESTIMATED AVG GLUCOSE: 203 MG/DL (ref 68–131)
GLUCOSE SERPL-MCNC: 163 MG/DL (ref 70–110)
HBA1C MFR BLD: 8.7 % (ref 4–5.6)
HCT VFR BLD AUTO: 34.6 % (ref 40–54)
HGB BLD-MCNC: 10.4 G/DL (ref 14–18)
IMM GRANULOCYTES # BLD AUTO: 0.03 K/UL (ref 0–0.04)
IMM GRANULOCYTES NFR BLD AUTO: 0.4 % (ref 0–0.5)
LYMPHOCYTES # BLD AUTO: 1.6 K/UL (ref 1–4.8)
LYMPHOCYTES NFR BLD: 21 % (ref 18–48)
MAGNESIUM SERPL-MCNC: 1.9 MG/DL (ref 1.6–2.6)
MCH RBC QN AUTO: 25.2 PG (ref 27–31)
MCHC RBC AUTO-ENTMCNC: 30.1 G/DL (ref 32–36)
MCV RBC AUTO: 84 FL (ref 82–98)
MONOCYTES # BLD AUTO: 0.5 K/UL (ref 0.3–1)
MONOCYTES NFR BLD: 6.6 % (ref 4–15)
NEUTROPHILS # BLD AUTO: 5.3 K/UL (ref 1.8–7.7)
NEUTROPHILS NFR BLD: 69.5 % (ref 38–73)
NRBC BLD-RTO: 0 /100 WBC
PHOSPHATE SERPL-MCNC: 3.2 MG/DL (ref 2.7–4.5)
PLATELET # BLD AUTO: 190 K/UL (ref 150–450)
PMV BLD AUTO: 11 FL (ref 9.2–12.9)
POCT GLUCOSE: 128 MG/DL (ref 70–110)
POCT GLUCOSE: 129 MG/DL (ref 70–110)
POCT GLUCOSE: 135 MG/DL (ref 70–110)
POCT GLUCOSE: 172 MG/DL (ref 70–110)
POTASSIUM SERPL-SCNC: 4.2 MMOL/L (ref 3.5–5.1)
PROT SERPL-MCNC: 6 G/DL (ref 6–8.4)
RBC # BLD AUTO: 4.13 M/UL (ref 4.6–6.2)
SODIUM SERPL-SCNC: 134 MMOL/L (ref 136–145)
WBC # BLD AUTO: 7.58 K/UL (ref 3.9–12.7)

## 2022-07-19 PROCEDURE — 63600175 PHARM REV CODE 636 W HCPCS: Performed by: INTERNAL MEDICINE

## 2022-07-19 PROCEDURE — 83036 HEMOGLOBIN GLYCOSYLATED A1C: CPT | Performed by: NURSE PRACTITIONER

## 2022-07-19 PROCEDURE — 99291 CRITICAL CARE FIRST HOUR: CPT | Mod: ,,, | Performed by: NURSE PRACTITIONER

## 2022-07-19 PROCEDURE — 83735 ASSAY OF MAGNESIUM: CPT | Performed by: NURSE PRACTITIONER

## 2022-07-19 PROCEDURE — 99900035 HC TECH TIME PER 15 MIN (STAT)

## 2022-07-19 PROCEDURE — 84100 ASSAY OF PHOSPHORUS: CPT | Performed by: NURSE PRACTITIONER

## 2022-07-19 PROCEDURE — 85025 COMPLETE CBC W/AUTO DIFF WBC: CPT | Performed by: NURSE PRACTITIONER

## 2022-07-19 PROCEDURE — 25000003 PHARM REV CODE 250: Performed by: INTERNAL MEDICINE

## 2022-07-19 PROCEDURE — 20000000 HC ICU ROOM

## 2022-07-19 PROCEDURE — 80053 COMPREHEN METABOLIC PANEL: CPT | Performed by: NURSE PRACTITIONER

## 2022-07-19 PROCEDURE — 63600175 PHARM REV CODE 636 W HCPCS: Performed by: NURSE PRACTITIONER

## 2022-07-19 PROCEDURE — 25000003 PHARM REV CODE 250: Performed by: NURSE PRACTITIONER

## 2022-07-19 PROCEDURE — 27000221 HC OXYGEN, UP TO 24 HOURS

## 2022-07-19 PROCEDURE — 94761 N-INVAS EAR/PLS OXIMETRY MLT: CPT

## 2022-07-19 PROCEDURE — 99291 PR CRITICAL CARE, E/M 30-74 MINUTES: ICD-10-PCS | Mod: ,,, | Performed by: NURSE PRACTITIONER

## 2022-07-19 RX ORDER — METOPROLOL SUCCINATE 50 MG/1
50 TABLET, EXTENDED RELEASE ORAL 2 TIMES DAILY
Status: DISCONTINUED | OUTPATIENT
Start: 2022-07-19 | End: 2022-07-22 | Stop reason: HOSPADM

## 2022-07-19 RX ORDER — IMIPRAMINE HYDROCHLORIDE 25 MG/1
25 TABLET, FILM COATED ORAL DAILY
COMMUNITY
Start: 2022-07-02 | End: 2022-08-09

## 2022-07-19 RX ADMIN — ENOXAPARIN SODIUM 40 MG: 100 INJECTION SUBCUTANEOUS at 08:07

## 2022-07-19 RX ADMIN — INSULIN ASPART 2 UNITS: 100 INJECTION, SOLUTION INTRAVENOUS; SUBCUTANEOUS at 05:07

## 2022-07-19 RX ADMIN — METOPROLOL SUCCINATE 50 MG: 50 TABLET, EXTENDED RELEASE ORAL at 09:07

## 2022-07-19 RX ADMIN — ACETAMINOPHEN 1000 MG: 500 TABLET ORAL at 08:07

## 2022-07-19 RX ADMIN — METOPROLOL SUCCINATE 50 MG: 50 TABLET, EXTENDED RELEASE ORAL at 08:07

## 2022-07-19 RX ADMIN — MEROPENEM 2 G: 1 INJECTION INTRAVENOUS at 02:07

## 2022-07-19 RX ADMIN — MEROPENEM 2 G: 1 INJECTION INTRAVENOUS at 06:07

## 2022-07-19 RX ADMIN — GUAIFENESIN AND DEXTROMETHORPHAN HYDROBROMIDE 1 TABLET: 600; 30 TABLET, EXTENDED RELEASE ORAL at 08:07

## 2022-07-19 RX ADMIN — ACETAMINOPHEN 1000 MG: 500 TABLET ORAL at 06:07

## 2022-07-19 RX ADMIN — ACETAMINOPHEN 1000 MG: 500 TABLET ORAL at 02:07

## 2022-07-19 RX ADMIN — VANCOMYCIN HYDROCHLORIDE 1500 MG: 1.5 INJECTION, POWDER, LYOPHILIZED, FOR SOLUTION INTRAVENOUS at 12:07

## 2022-07-19 RX ADMIN — MEROPENEM 2 G: 1 INJECTION INTRAVENOUS at 09:07

## 2022-07-19 RX ADMIN — GUAIFENESIN AND DEXTROMETHORPHAN HYDROBROMIDE 1 TABLET: 600; 30 TABLET, EXTENDED RELEASE ORAL at 12:07
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
## 2022-07-19 NOTE — PLAN OF CARE
Recommendations     1. Continue current 2000 kcal ADA diet; add Boost Glucose Control ONS BID to aid in caloric intake & wound healing.   2. RD to monitor & follow-up.     Goals: Meet % EEN, EPN by RD f/u date  Nutrition Goal Status: new  Communication of RD Recs: reviewed with RN

## 2022-07-19 NOTE — PLAN OF CARE
CMICU DAILY GOALS       A: Awake    RASS: Goal -    Actual -     Restraint necessity:    B: Breathe   SBT: NA   C: Coordinate A & B, analgesics/sedatives   Pain: managed    SAT: NA  D: Delirium   CAM-ICU: Overall CAM-ICU: Negative  E: Early(intubated/ Progressive (non-intubated) Mobility   MOVE Screen: Fail   Activity: Activity Management: Patient unable to perform activities  FAS: Feeding/Nutrition   Diet order:  ,    T: Thrombus   DVT prophylaxis: VTE Required Core Measure: Pharmacological prophylaxis initiated/maintained  H: HOB Elevation   Head of Bed (HOB) Positioning: HOB at 30-45 degrees  U: Ulcer Prophylaxis   GI: no  G: Glucose control   managed    S: Skin      Device Skin Pressure Protection: positioning supports utilized  Pressure Reduction Devices: positioning supports utilized  Pressure Reduction Techniques: positioned off wounds, pressure points protected  Skin Protection: adhesive use limited  B: Bowel Function   no issues   I: Indwelling Catheters   Waters necessity:     CVC necessity: No  D: De-escalation Antibiotics   Yes    Family/Goals of care/Code Status   Code Status: Full Code    24H Vital Sign Range  Temp:  [97.8 °F (36.6 °C)-98.5 °F (36.9 °C)]   Pulse:  []   Resp:  [11-27]   BP: ()/()   SpO2:  [94 %-100 %]      Shift Events   No acute events throughout shift    VS and assessment per flow sheet, patient progressing towards goals as tolerated, plan of care reviewed with family, all concerns addressed, will continue to monitor.    Nat Clark

## 2022-07-19 NOTE — CONSULTS
"  [facility] - Cardiac Medical [facility]  Adult Nutrition  Consult Note    SUMMARY     Recommendations    1. Continue current 2000 kcal ADA diet; add Boost Glucose Control ONS BID to aid in caloric intake & wound healing.   2. RD to monitor & follow-up.    Goals: Meet % EEN, EPN by RD f/u date  Nutrition Goal Status: new  Communication of RD Recs: reviewed with RN    Assessment and Plan    Nutrition Problem:  Inadequate energy intake    Related to (etiology):   Decreased appetite     Signs and Symptoms (as evidenced by):   Poor PO intake 2/2 sore throat     Interventions(treatment strategy):  Collaboration of nutrition care w/ other providers  ONS    Nutrition Diagnosis Status:   New    Reason for Assessment    Reason For Assessment: consult  Diagnosis: other (see comments) (Cardiac arrest)  Relevant Medical History: Quadriplegia  Interdisciplinary Rounds: attended    General Information Comments: Pt resting at time of visit, spoke w/ family at bedside. Pt currently w/ decreased PO intake 2/2 sore throat from intubation. PTA - pt w/ good appetite & stable weight of 260#. Appears nourished w/ no indicators of malnutrition. Willing to try ONS. Wound noted.  Nutrition Discharge Planning: Adequate nutrition    Nutrition/Diet History    Factors Affecting Nutritional Intake: other (see comments) (Sore throat)    Anthropometrics    Temp: 98.3 °F (36.8 °C)  Height: 5' 8" (172.7 cm)  Height (inches): 68 in  Weight Method: Bed Scale  Weight: 119.3 kg (263 lb 0.1 oz)  Weight (lb): 263.01 lb  Ideal Body Weight (IBW), Male: 154 lb  % Ideal Body Weight, Male (lb): 170.79 %  BMI (Calculated): 40  BMI Grade: greater than 40 - morbid obesity    Lab/Procedures/Meds    Pertinent Labs Reviewed: reviewed  Pertinent Labs Comments: Na 134, A1C 8.7  Pertinent Medications Reviewed: reviewed    Estimated/Assessed Needs    Weight Used For Calorie Calculations: 119.3 kg (263 lb 0.1 oz)     Energy Calorie Requirements (kcal): 2008 kcal/d  Energy " Need Method: [place] (1.0 PAL)     Protein Requirements: 108 g/d (.9 g/kg)  Weight Used For Protein Calculations: 119.3 kg (263 lb 0.1 oz)     Estimated Fluid Requirement Method: other (see comments) (Per MD or 1 mL/kcal)  RDA Method (mL): 2008    Nutrition Prescription Ordered    Current Diet Order: 2000 kcal ADA    Evaluation of Received Nutrient/Fluid Intake    I/O: +5.3L since admit    Comments: LBM: 7/18    Tolerance: tolerating    Nutrition Risk    Level of Risk/Frequency of Follow-up:  (1x/week)     Monitor and Evaluation    Food and Nutrient Intake: energy intake, food and beverage intake  Food and Nutrient Adminstration: diet order  Physical Activity and Function: nutrition-related ADLs and IADLs  Anthropometric Measurements: weight, weight change  Biochemical Data, Medical Tests and Procedures: inflammatory profile, lipid profile, glucose/endocrine profile, gastrointestinal profile, electrolyte and renal panel  Nutrition-Focused Physical Findings: overall appearance     Nutrition Follow-Up    RD Follow-up?: Yes

## 2022-07-19 NOTE — PLAN OF CARE
CMICU DAILY GOALS       A: Awake    RASS: Goal -    Actual -     Restraint necessity:    B: Breathe   SBT: Not intubated   C: Coordinate A & B, analgesics/sedatives   Pain: managed    SAT: Not intubated  D: Delirium   CAM-ICU: Overall CAM-ICU: Negative  E: Early(intubated/ Progressive (non-intubated) Mobility   MOVE Screen: Pass   Activity: Activity Management: Patient unable to perform activities  FAS: Feeding/Nutrition   Diet order:  ,    T: Thrombus   DVT prophylaxis:    H: HOB Elevation   Head of Bed (HOB) Positioning: HOB at 30-45 degrees  U: Ulcer Prophylaxis   GI: yes  G: Glucose control   managed    S: Skin      Device Skin Pressure Protection: positioning supports utilized  Pressure Reduction Devices: positioning supports utilized  Pressure Reduction Techniques: heels elevated off bed  Skin Protection: adhesive use limited  B: Bowel Function   constipation   I: Indwelling Catheters   Waters necessity:     CVC necessity: No  D: De-escalation Antibiotics   Yes    Family/Goals of care/Code Status   Code Status: Full Code    24H Vital Sign Range  Temp:  [97 °F (36.1 °C)-98.9 °F (37.2 °C)]   Pulse:  [62-95]   Resp:  [10-22]   BP: ()/()   SpO2:  [94 %-100 %]      Shift Events   No acute events throughout shift    VS and assessment per flow sheet, patient progressing towards goals as tolerated, plan of care reviewed with family, all concerns addressed, will continue to monitor.    Gerson Dillard

## 2022-07-19 NOTE — PROGRESS NOTES
07/19/22 1200   WOCN Assessment   WOCN Total Time (mins) 30   Visit Date 07/19/22   Visit Time 1200   Consult Type New   Wound skin tear   Number of Wounds 1   Intervention assessed;changed;chart review;orders   Teaching on-going        Altered Skin Integrity 05/06/22 1140 Right Heel  Full thickness tissue loss. Subcutaneous fat may be visible but bone, tendon or muscle are not exposed   Date First Assessed/Time First Assessed: 05/06/22 1140   Side: Right  Location: Heel  Is this injury device related?: No  Primary Wound Type: (c)   Description of Altered Skin Integrity: Full thickness tissue loss. Subcutaneous fat may be visible but ...   Wound Image     Dressing Appearance Dry;Intact   Drainage Amount Scant   Drainage Characteristics/Odor Serous   Appearance Red;Purple   Red (%), Wound Tissue Color 75 %   Wound Length (cm) 5 cm   Wound Width (cm) 4.5 cm   Wound Depth (cm) 0.2 cm   Wound Volume (cm^3) 4.5 cm^3   Wound Surface Area (cm^2) 22.5 cm^2   Care Cleansed with:;Povidone iodine   Dressing Changed;Absorptive Pad;Rolled gauze        Altered Skin Integrity 05/06/22 1147 Right posterior Calf Abscess Full thickness tissue loss. Subcutaneous fat may be visible but bone, tendon or muscle are not exposed   Date First Assessed/Time First Assessed: 05/06/22 1147   Side: Right  Orientation: posterior  Location: Calf  Is this injury device related?: No  Primary Wound Type: Abscess  Description of Altered Skin Integrity: Full thickness tissue loss. Subcutane...   Wound Image    Description of Altered Skin Integrity Full thickness tissue loss. Subcutaneous fat may be visible but bone, tendon or muscle are not exposed   Dressing Appearance Dry;Intact   Drainage Amount Scant   Drainage Characteristics/Odor Serous   Red (%), Wound Tissue Color 100 %   Wound Length (cm) 2 cm   Wound Width (cm) 3.5 cm   Wound Depth (cm) 1.5 cm   Wound Volume (cm^3) 10.5 cm^3   Wound Surface Area (cm^2) 7 cm^2   Care Cleansed with:;Sterile  normal saline   Dressing Foam        Altered Skin Integrity 07/19/22 1200 Right anterior;lateral Thigh Skin Tear Partial thickness tissue loss. Shallow open ulcer with a red or pink wound bed, without slough. Intact or Open/Ruptured Serum-filled blister.   Date First Assessed/Time First Assessed: 07/19/22 1200   Side: Right  Orientation: anterior;lateral  Location: Thigh  Primary Wound Type: Skin Tear  Description of Altered Skin Integrity: Partial thickness tissue loss. Shallow open ulcer with a red or...   Description of Altered Skin Integrity Partial thickness tissue loss. Shallow open ulcer with a red or pink wound bed, without slough. Intact or Open/Ruptured Serum-filled blister.   Dressing Appearance Dry;Intact   Drainage Amount None   Wound Length (cm) 3 cm   Wound Width (cm) 3 cm   Wound Depth (cm) 0.1 cm   Wound Volume (cm^3) 0.9 cm^3   Wound Surface Area (cm^2) 9 cm^2   Dressing Foam   Wound consult performed.  Recommendations:  Rt heel-clean with betadine, apply mesalt, gauze, and kerlix.  Rt calf--clean with NS, apply mesalt gauze and tape.  Rt lat thigh--clean with NS, pat dry, apply foam drsg.   No open areas noted to sacrum.  Evidence of healed old wounds noted.  Apply Triad to rosaura buttocks, scrotum, and groin area.  Pt reports that he sees a wound care specialist and mother does wound care at home.  Harpreet well. Family member at bedside.  Orders placed.  Will sign off.  Please re consult if  needed.

## 2022-07-19 NOTE — PT/OT/SLP PROGRESS
Speech Language Pathology      Antonio Galvan II  MRN: [id number]    Patient not seen today secondary to  (SLP attempted to initiate bedside swallow study, but upon arrival nurse was called into room by family members stating pt needed cough assist due to loose secretions and was having difficulty breathing. Nurse nearby and immediately notified.). If SLP unable to return again on this service date, SLP services with return on 7/20.

## 2022-07-20 PROBLEM — N31.9 NEUROGENIC BLADDER: Status: ACTIVE | Noted: 2022-07-20

## 2022-07-20 LAB
ALBUMIN SERPL BCP-MCNC: 2.7 G/DL (ref 3.5–5.2)
ALP SERPL-CCNC: 113 U/L (ref 55–135)
ALT SERPL W/O P-5'-P-CCNC: 28 U/L (ref 10–44)
ANION GAP SERPL CALC-SCNC: 7 MMOL/L (ref 8–16)
AST SERPL-CCNC: 12 U/L (ref 10–40)
BASOPHILS # BLD AUTO: 0.03 K/UL (ref 0–0.2)
BASOPHILS NFR BLD: 0.5 % (ref 0–1.9)
BILIRUB SERPL-MCNC: 0.9 MG/DL (ref 0.1–1)
BUN SERPL-MCNC: 13 MG/DL (ref 6–20)
CALCIUM SERPL-MCNC: 8.4 MG/DL (ref 8.7–10.5)
CHLORIDE SERPL-SCNC: 103 MMOL/L (ref 95–110)
CO2 SERPL-SCNC: 25 MMOL/L (ref 23–29)
CREAT SERPL-MCNC: 0.8 MG/DL (ref 0.5–1.4)
DIFFERENTIAL METHOD: ABNORMAL
EOSINOPHIL # BLD AUTO: 0.3 K/UL (ref 0–0.5)
EOSINOPHIL NFR BLD: 4.8 % (ref 0–8)
ERYTHROCYTE [DISTWIDTH] IN BLOOD BY AUTOMATED COUNT: 18.2 % (ref 11.5–14.5)
EST. GFR  (AFRICAN AMERICAN): >60 ML/MIN/1.73 M^2
EST. GFR  (NON AFRICAN AMERICAN): >60 ML/MIN/1.73 M^2
GLUCOSE SERPL-MCNC: 187 MG/DL (ref 70–110)
HCT VFR BLD AUTO: 33.9 % (ref 40–54)
HGB BLD-MCNC: 10.2 G/DL (ref 14–18)
IMM GRANULOCYTES # BLD AUTO: 0.02 K/UL (ref 0–0.04)
IMM GRANULOCYTES NFR BLD AUTO: 0.3 % (ref 0–0.5)
LYMPHOCYTES # BLD AUTO: 1.6 K/UL (ref 1–4.8)
LYMPHOCYTES NFR BLD: 25 % (ref 18–48)
MAGNESIUM SERPL-MCNC: 1.9 MG/DL (ref 1.6–2.6)
MCH RBC QN AUTO: 25.1 PG (ref 27–31)
MCHC RBC AUTO-ENTMCNC: 30.1 G/DL (ref 32–36)
MCV RBC AUTO: 83 FL (ref 82–98)
MONOCYTES # BLD AUTO: 0.5 K/UL (ref 0.3–1)
MONOCYTES NFR BLD: 7.8 % (ref 4–15)
NEUTROPHILS # BLD AUTO: 4 K/UL (ref 1.8–7.7)
NEUTROPHILS NFR BLD: 61.6 % (ref 38–73)
NRBC BLD-RTO: 0 /100 WBC
PHOSPHATE SERPL-MCNC: 2.5 MG/DL (ref 2.7–4.5)
PLATELET # BLD AUTO: 182 K/UL (ref 150–450)
PMV BLD AUTO: 10.9 FL (ref 9.2–12.9)
POCT GLUCOSE: 184 MG/DL (ref 70–110)
POCT GLUCOSE: 187 MG/DL (ref 70–110)
POCT GLUCOSE: 205 MG/DL (ref 70–110)
POCT GLUCOSE: 211 MG/DL (ref 70–110)
POTASSIUM SERPL-SCNC: 4 MMOL/L (ref 3.5–5.1)
PROT SERPL-MCNC: 6.2 G/DL (ref 6–8.4)
RBC # BLD AUTO: 4.07 M/UL (ref 4.6–6.2)
SODIUM SERPL-SCNC: 135 MMOL/L (ref 136–145)
WBC # BLD AUTO: 6.43 K/UL (ref 3.9–12.7)

## 2022-07-20 PROCEDURE — 99900035 HC TECH TIME PER 15 MIN (STAT)

## 2022-07-20 PROCEDURE — 83735 ASSAY OF MAGNESIUM: CPT | Performed by: NURSE PRACTITIONER

## 2022-07-20 PROCEDURE — 25000242 PHARM REV CODE 250 ALT 637 W/ HCPCS: Performed by: NURSE PRACTITIONER

## 2022-07-20 PROCEDURE — 99233 SBSQ HOSP IP/OBS HIGH 50: CPT | Mod: ,,, | Performed by: NURSE PRACTITIONER

## 2022-07-20 PROCEDURE — 94761 N-INVAS EAR/PLS OXIMETRY MLT: CPT

## 2022-07-20 PROCEDURE — 84100 ASSAY OF PHOSPHORUS: CPT | Performed by: NURSE PRACTITIONER

## 2022-07-20 PROCEDURE — 63600175 PHARM REV CODE 636 W HCPCS: Performed by: INTERNAL MEDICINE

## 2022-07-20 PROCEDURE — 85025 COMPLETE CBC W/AUTO DIFF WBC: CPT | Performed by: NURSE PRACTITIONER

## 2022-07-20 PROCEDURE — 99233 PR SUBSEQUENT HOSPITAL CARE,LEVL III: ICD-10-PCS | Mod: ,,, | Performed by: NURSE PRACTITIONER

## 2022-07-20 PROCEDURE — 63600175 PHARM REV CODE 636 W HCPCS: Performed by: NURSE PRACTITIONER

## 2022-07-20 PROCEDURE — 27000207 HC ISOLATION

## 2022-07-20 PROCEDURE — 80053 COMPREHEN METABOLIC PANEL: CPT | Performed by: NURSE PRACTITIONER

## 2022-07-20 PROCEDURE — 25500020 PHARM REV CODE 255: Performed by: INTERNAL MEDICINE

## 2022-07-20 PROCEDURE — 25000003 PHARM REV CODE 250: Performed by: NURSE PRACTITIONER

## 2022-07-20 PROCEDURE — 20000000 HC ICU ROOM

## 2022-07-20 PROCEDURE — 27000221 HC OXYGEN, UP TO 24 HOURS

## 2022-07-20 PROCEDURE — 25000003 PHARM REV CODE 250: Performed by: INTERNAL MEDICINE

## 2022-07-20 RX ORDER — FLUTICASONE PROPIONATE 50 MCG
2 SPRAY, SUSPENSION (ML) NASAL DAILY
Status: DISCONTINUED | OUTPATIENT
Start: 2022-07-20 | End: 2022-07-22 | Stop reason: HOSPADM

## 2022-07-20 RX ORDER — INSULIN ASPART 100 [IU]/ML
1-10 INJECTION, SOLUTION INTRAVENOUS; SUBCUTANEOUS
Status: DISCONTINUED | OUTPATIENT
Start: 2022-07-20 | End: 2022-07-22 | Stop reason: HOSPADM

## 2022-07-20 RX ORDER — TALC
6 POWDER (GRAM) TOPICAL NIGHTLY PRN
Status: DISCONTINUED | OUTPATIENT
Start: 2022-07-20 | End: 2022-07-22 | Stop reason: HOSPADM

## 2022-07-20 RX ORDER — GLUCAGON 1 MG
1 KIT INJECTION
Status: DISCONTINUED | OUTPATIENT
Start: 2022-07-20 | End: 2022-07-22 | Stop reason: HOSPADM

## 2022-07-20 RX ORDER — IBUPROFEN 200 MG
24 TABLET ORAL
Status: DISCONTINUED | OUTPATIENT
Start: 2022-07-20 | End: 2022-07-22 | Stop reason: HOSPADM

## 2022-07-20 RX ORDER — MUPIROCIN 20 MG/G
OINTMENT TOPICAL 2 TIMES DAILY
Status: DISCONTINUED | OUTPATIENT
Start: 2022-07-20 | End: 2022-07-22 | Stop reason: HOSPADM

## 2022-07-20 RX ORDER — IBUPROFEN 200 MG
16 TABLET ORAL
Status: DISCONTINUED | OUTPATIENT
Start: 2022-07-20 | End: 2022-07-22 | Stop reason: HOSPADM

## 2022-07-20 RX ORDER — OXYBUTYNIN CHLORIDE 5 MG/1
5 TABLET ORAL 3 TIMES DAILY
Status: DISCONTINUED | OUTPATIENT
Start: 2022-07-20 | End: 2022-07-22 | Stop reason: HOSPADM

## 2022-07-20 RX ADMIN — OXYBUTYNIN CHLORIDE 5 MG: 5 TABLET ORAL at 05:07

## 2022-07-20 RX ADMIN — GUAIFENESIN AND DEXTROMETHORPHAN HYDROBROMIDE 1 TABLET: 600; 30 TABLET, EXTENDED RELEASE ORAL at 09:07

## 2022-07-20 RX ADMIN — OXYBUTYNIN CHLORIDE 5 MG: 5 TABLET ORAL at 09:07

## 2022-07-20 RX ADMIN — ACETAMINOPHEN 1000 MG: 500 TABLET ORAL at 09:07

## 2022-07-20 RX ADMIN — Medication 6 MG: at 02:07

## 2022-07-20 RX ADMIN — APIXABAN 5 MG: 5 TABLET, FILM COATED ORAL at 09:07

## 2022-07-20 RX ADMIN — INSULIN ASPART 2 UNITS: 100 INJECTION, SOLUTION INTRAVENOUS; SUBCUTANEOUS at 09:07

## 2022-07-20 RX ADMIN — MUPIROCIN: 20 OINTMENT TOPICAL at 09:07

## 2022-07-20 RX ADMIN — METOPROLOL SUCCINATE 50 MG: 50 TABLET, EXTENDED RELEASE ORAL at 09:07

## 2022-07-20 RX ADMIN — GUAIFENESIN AND DEXTROMETHORPHAN HYDROBROMIDE 1 TABLET: 600; 30 TABLET, EXTENDED RELEASE ORAL at 10:07

## 2022-07-20 RX ADMIN — INSULIN ASPART 2 UNITS: 100 INJECTION, SOLUTION INTRAVENOUS; SUBCUTANEOUS at 12:07

## 2022-07-20 RX ADMIN — VANCOMYCIN HYDROCHLORIDE 1500 MG: 1.5 INJECTION, POWDER, LYOPHILIZED, FOR SOLUTION INTRAVENOUS at 12:07

## 2022-07-20 RX ADMIN — Medication 1 ENEMA: at 09:07

## 2022-07-20 RX ADMIN — METOPROLOL SUCCINATE 50 MG: 50 TABLET, EXTENDED RELEASE ORAL at 10:07

## 2022-07-20 RX ADMIN — APIXABAN 5 MG: 5 TABLET, FILM COATED ORAL at 10:07

## 2022-07-20 RX ADMIN — OXYBUTYNIN CHLORIDE 5 MG: 5 TABLET ORAL at 10:07

## 2022-07-20 RX ADMIN — INSULIN ASPART 6 UNITS: 100 INJECTION, SOLUTION INTRAVENOUS; SUBCUTANEOUS at 05:07

## 2022-07-20 RX ADMIN — MUPIROCIN: 20 OINTMENT TOPICAL at 10:07

## 2022-07-20 RX ADMIN — INSULIN ASPART 4 UNITS: 100 INJECTION, SOLUTION INTRAVENOUS; SUBCUTANEOUS at 06:07

## 2022-07-20 RX ADMIN — MEROPENEM 2 G: 1 INJECTION INTRAVENOUS at 02:07

## 2022-07-20 RX ADMIN — FLUTICASONE PROPIONATE 100 MCG: 50 SPRAY, METERED NASAL at 09:07

## 2022-07-20 RX ADMIN — IOHEXOL 100 ML: 350 INJECTION, SOLUTION INTRAVENOUS at 02:07

## 2022-07-20 NOTE — ASSESSMENT & PLAN NOTE
This patient does have evidence of infective focus  My overall impression is sepsis. Vital signs were reviewed and noted in progress note.  Antibiotics given-   Antibiotics (From admission, onward)            Start     Stop Route Frequency Ordered    07/20/22 0900  mupirocin 2 % ointment         07/25 0859 Nasl 2 times daily 07/20/22 0728    07/18/22 1045  meropenem (MERREM) 2 g in sodium chloride 0.9% 100 mL IVPB         -- IV Every 8 hours (non-standard times) 07/18/22 0934        Cultures were taken-   Microbiology Results (last 7 days)     Procedure Component Value Units Date/Time    Blood culture [[id number]] Collected: 07/18/22 1043    Order Status: Completed Specimen: Blood from Peripheral, Upper Arm, Right Updated: 07/19/22 1412     Blood Culture, Routine No Growth to date      No Growth to date    Blood culture [[id number]] Collected: 07/18/22 1135    Order Status: Completed Specimen: Blood from Peripheral, Hand, Right Updated: 07/19/22 1412     Blood Culture, Routine No Growth to date      No Growth to date        Latest lactate reviewed, they are-  No results for input(s): LACTATE in the last 72 hours.    Organ dysfunction indicated by Acute respiratory failure  Source- urine vs PNA    Previous MRSA, Serratia, and Proteus infections     -- Abx stopped 07/20  -- ID recommendations noted  -- Follow up urine culture. Supra pubic sandhu changed. Active infection versus colonization --> no growth  -- Follow up blood cultures --> no growth   -- Wound care consulted. Appreciate recommendations
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
## 2022-07-20 NOTE — ASSESSMENT & PLAN NOTE
Patient with Hypoxic Respiratory failure which is Acute.  he is not on home oxygen.     Supplemental oxygen was provided and noted-  .     Signs/symptoms of respiratory failure include- tachypnea, increased work of breathing and respiratory distress. Contributing diagnoses includes - Aspiration and mucous plug Labs and images were reviewed. Patient Has recent ABG, which has been reviewed. Will treat underlying causes and adjust management of respiratory failure     -- Wean oxygen for SpO2 > 90%   -- Cough assist BID   -- Aggressive pulm toileting in setting of quadriplegia   -- Continue BSA and infectious work up   -- CT sinuses pending   -- Mucolytic started   -- NT suction PRN to assist with secretion management   -- Will need outpatient pulm follow up

## 2022-07-20 NOTE — SUBJECTIVE & OBJECTIVE
Interval History/Significant Events: Cough assist utilized overnight. Increasing HR noted this morning. Adding home BB.     Review of Systems   Constitutional:  Negative for chills and fever.   HENT:  Positive for congestion, postnasal drip, sinus pressure and sinus pain.    Respiratory:  Positive for cough. Negative for shortness of breath.    Cardiovascular:  Negative for chest pain and leg swelling.   Gastrointestinal:  Positive for constipation. Negative for nausea.   Genitourinary:  Negative for hematuria and urgency.   Musculoskeletal:  Negative for joint swelling.   Neurological:  Negative for dizziness and numbness.   Psychiatric/Behavioral:  Negative for agitation and behavioral problems.    Objective:     Vital Signs (Most Recent):  Temp: 98.3 °F (36.8 °C) (07/19/22 1600)  Pulse: (!) 126 (07/19/22 1800)  Resp: (!) 27 (07/19/22 1800)  BP: (!) 152/89 (07/19/22 1800)  SpO2: 97 % (07/19/22 1800)   Vital Signs (24h Range):  Temp:  [97.8 °F (36.6 °C)-98.5 °F (36.9 °C)] 98.3 °F (36.8 °C)  Pulse:  [] 126  Resp:  [11-27] 27  SpO2:  [94 %-100 %] 97 %  BP: ()/() 152/89   Weight: 119.3 kg (263 lb 0.1 oz)  Body mass index is 39.99 kg/m².      Intake/Output Summary (Last 24 hours) at 7/19/2022 1941  Last data filed at 7/19/2022 1815  Gross per 24 hour   Intake 2180.52 ml   Output 400 ml   Net 1780.52 ml       Physical Exam  Vitals and nursing note reviewed.   Constitutional:       General: He is not in acute distress.     Appearance: He is obese. He is ill-appearing.   HENT:      Head: Normocephalic and atraumatic.      Mouth/Throat:      Mouth: Mucous membranes are dry.      Pharynx: No oropharyngeal exudate.   Eyes:      General: No scleral icterus.     Pupils: Pupils are equal, round, and reactive to light.      Comments: Injected sclera   Cardiovascular:      Rate and Rhythm: Tachycardia present. Rhythm irregular.      Heart sounds: No murmur heard.    No gallop.   Pulmonary:      Effort: No  respiratory distress.      Breath sounds: Decreased air movement present. No wheezing.   Abdominal:      General: Bowel sounds are normal. There is distension.      Palpations: Abdomen is soft.      Tenderness: There is no abdominal tenderness.   Musculoskeletal:      Right lower leg: Edema present.      Left lower leg: Edema present.   Neurological:      Mental Status: He is alert and oriented to person, place, and time. Mental status is at baseline.      GCS: GCS eye subscore is 4. GCS verbal subscore is 5. GCS motor subscore is 6.       Vents:  Oxygen Concentration (%): 50 (07/18/22 0900)  Lines/Drains/Airways       Drain  Duration                  Suprapubic Catheter -- days              Peripheral Intravenous Line  Duration                  Peripheral IV - Single Lumen 07/18/22 0215 22 G Left Wrist 1 day         Peripheral IV - Single Lumen 07/18/22 1348 Anterior;Distal;Right Upper Arm 1 day                  Significant Labs:    CBC/Anemia Profile:  Recent Labs   Lab 07/18/22  0229 07/19/22  0212   WBC 10.4 7.58   HGB 12.7* 10.4*   HCT 43.4 34.6*    190   MCV 85.1 84   RDW 18.1* 18.0*        Chemistries:  Recent Labs   Lab 07/18/22  0229 07/18/22  1325 07/19/22  0212    134* 134*   K 5.5* 4.5 4.2   CL  --  102 103   CO2 23 24 24   BUN 18.6 19 17   CREATININE 1.04 0.9 0.8   CALCIUM 9.2 9.2 8.7   ALBUMIN 3.4*  --  2.9*   PROT  --   --  6.0   BILITOT 0.6  --  0.8   ALKPHOS 146  --  112   ALT 70*  --  43   AST 69*  --  16   GLUCOSE 210*  --   --    MG 2.10  --  1.9   PHOS 4.7  --  3.2       All pertinent labs within the past 24 hours have been reviewed.    Significant Imaging:  I have reviewed all pertinent imaging results/findings within the past 24 hours.

## 2022-07-20 NOTE — ASSESSMENT & PLAN NOTE
Home regimen: 100u Lantus QHS, Novolin R with meals    -- q6h accu checks with [facility]   -- Inpatient goal 140-180  -- A1C 8.7 this admission   -- Diabetic diet once tolerating PO

## 2022-07-20 NOTE — ASSESSMENT & PLAN NOTE
56 year old male presented to OSH s/p cardiac arrest with ROSC achieved after 2 mins per EMS report. Respiratory etiology (aspiration vs mucous plug) likely. Patient is awake, alert, and oriented after arrest. Weaned from BiPap after arrival    -- No indication for TTM at this time   -- Wean oxygen for Spo2 > 90%   -- NIPPV PRN   -- Continue Toprol 50mg BID   -- Continuous telemetry   -- Troponin and BNP grossly WNL   -- Broad spectrum abx and infectious work up --> stopped 07/20  -- SLP ordered given aspiration risk   -- Further management per respiratory failure    ECHO:   · Technically difficult portable study  · The left ventricle is normal in size with concentric remodeling and mildly decreased systolic function. The estimated ejection fraction is 40-45%.  · Normal right ventricular size with moderately reduced right ventricular systolic function.  · Moderate left atrial enlargement.  · Atrial fibrillation observed.

## 2022-07-20 NOTE — ASSESSMENT & PLAN NOTE
56 year old male presented to OSH s/p cardiac arrest with ROSC achieved after 2 mins per EMS report. Respiratory etiology (aspiration vs mucous plug) likely. Patient is awake, alert, and oriented after arrest. Weaned from BiPap after arrival    -- No indication for TTM at this time   -- Wean oxygen for Spo2 > 90%   -- NIPPV PRN   -- Resume home beta blocker today   -- Continuous telemetry   -- Troponin and BNP grossly WNL   -- Broad spectrum abx and infectious work up   -- SLP ordered given aspiration risk     ECHO:   · Technically difficult portable study  · The left ventricle is normal in size with concentric remodeling and mildly decreased systolic function. The estimated ejection fraction is 40-45%.  · Normal right ventricular size with moderately reduced right ventricular systolic function.  · Moderate left atrial enlargement.  · Atrial fibrillation observed.

## 2022-07-20 NOTE — PT/OT/SLP PROGRESS
Speech Language Pathology  Pt not seen    Antonio LAGUERRE Mandy PATTERSON  MRN: [id number]    Patient not seen today secondary to SLP asked to return at a later time by family in AM and MARIZA at CT upon PM attempt.     7/20/2022

## 2022-07-20 NOTE — PROGRESS NOTES
Pharmacokinetic Sign-off: IV Vancomycin    Therapy with vancomycin complete and/or consult discontinued by provider.  Pharmacy will sign off, please re-consult as needed.    Anusha Felix, PharmD, BCPS  EXT [phone]
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
## 2022-07-20 NOTE — ASSESSMENT & PLAN NOTE
"This patient does have evidence of infective focus  My overall impression is sepsis. Vital signs were reviewed and noted in progress note.  Antibiotics given-   Antibiotics (From admission, onward)            Start     Stop Route Frequency Ordered    07/18/22 2300  vancomycin 1.5 g in dextrose 5 % 250 mL IVPB (ready to mix)         -- IV Every 12 hours (non-standard times) 07/18/22 1312    07/18/22 1045  meropenem (MERREM) 2 g in sodium chloride 0.9% 100 mL IVPB         -- IV Every 8 hours (non-standard times) 07/18/22 0934    07/18/22 1028  vancomycin - pharmacy to dose  (vancomycin IVPB)        "And" Linked Group Details    -- IV pharmacy to manage frequency 07/18/22 0934        Cultures were taken-   Microbiology Results (last 7 days)     Procedure Component Value Units Date/Time    Blood culture [[id number]] Collected: 07/18/22 1043    Order Status: Completed Specimen: Blood from Peripheral, Upper Arm, Right Updated: 07/19/22 1412     Blood Culture, Routine No Growth to date      No Growth to date    Blood culture [[id number]] Collected: 07/18/22 1135    Order Status: Completed Specimen: Blood from Peripheral, Hand, Right Updated: 07/19/22 1412     Blood Culture, Routine No Growth to date      No Growth to date        Latest lactate reviewed, they are-  No results for input(s): LACTATE in the last 72 hours.    Organ dysfunction indicated by Acute respiratory failure  Source- urine vs PNA    Previous MRSA, Serratia, and Proteus infections     -- Continue Vanc / Meropenem   -- ID recommendations appreciated   -- Pharm adjusted Vanc. Appreciate assistance   -- Follow up urine culture. Supra pubic sandhu changed. Active infection versus colonization   -- Follow up blood cultures   -- Wound care consulted. Appreciate assistance       "

## 2022-07-20 NOTE — ASSESSMENT & PLAN NOTE
Patient with Hypoxic Respiratory failure which is Acute.  he is not on home oxygen.     Supplemental oxygen was provided and noted-  .     Signs/symptoms of respiratory failure include- tachypnea, increased work of breathing and respiratory distress. Contributing diagnoses includes - Aspiration and mucous plug Labs and images were reviewed. Patient Has recent ABG, which has been reviewed. Will treat underlying causes and adjust management of respiratory failure     -- Wean oxygen for SpO2 > 90%   -- Cough assist BID   -- Aggressive pulm toileting in setting of quadriplegia   -- Infectious work up remains negative. De-escalating abx   -- CT sinuses pending   -- Continue mucolytics. Resume Flonase.  -- NT suction PRN to assist with secretion management   -- Outpatient pulm follow up

## 2022-07-20 NOTE — PLAN OF CARE
CMICU DAILY GOALS       A: Awake    RASS: Goal -    Actual -     Restraint necessity:    B: Breathe   SBT: NA   C: Coordinate A & B, analgesics/sedatives   Pain: {Managed/ Uncontrolled:37191}    SAT: {SBT pass/fail:29402}  D: Delirium   CAM-ICU: Overall CAM-ICU: Negative  E: Early(intubated/ Progressive (non-intubated) Mobility   MOVE Screen: {Pass/Fail:06699}   Activity: Activity Management: Patient unable to perform activities  FAS: Feeding/Nutrition   Diet order:  ,    T: Thrombus   DVT prophylaxis: VTE Required Core Measure: Pharmacological prophylaxis initiated/maintained  H: HOB Elevation   Head of Bed (HOB) Positioning: HOB at 30-45 degrees  U: Ulcer Prophylaxis   GI: {YES/NO:82827}  G: Glucose control   {Managed/ Uncontrolled:80865} Glycemic Management: blood glucose monitored  S: Skin      Device Skin Pressure Protection: pressure points protected  Pressure Reduction Devices: positioning supports utilized  Pressure Reduction Techniques: weight shift assistance provided  Skin Protection: adhesive use limited  B: Bowel Function   {bowel:71982}   I: Indwelling Catheters   Waters necessity:     CVC necessity: {YES:88191}  D: De-escalation Antibiotics   {YES:09289}    Family/Goals of care/Code Status   Code Status: Full Code    24H Vital Sign Range  Temp:  [98.3 °F (36.8 °C)-99.3 °F (37.4 °C)]   Pulse:  []   Resp:  [10-27]   BP: (123-172)/()   SpO2:  [97 %-100 %]      Shift Events   {Daily Events:31858}    VS and assessment per flow sheet, patient progressing towards goals as tolerated, plan of care reviewed with {POC Review:43804}, all concerns addressed, will continue to monitor.    Nat Clark

## 2022-07-20 NOTE — HOSPITAL COURSE
56 year old male admitted to critical care medicine after cardiac arrest suspected to be secondary from aspiration / mucous plugging. Has been awake, alert, oriented since arrival with stable hemodynamics. Secretions seems to be primary problem for patient and family. 3% Na, expectorants/mucolytic's, cough assist, NT suctioning ordered. CT sinuses pending. Will need outpatient pulm follow up per patient and family request. In relation to cardiac arrest: ECHO with EF 40-45% EF, BNP WNL.

## 2022-07-20 NOTE — PLAN OF CARE
Pt was unable to fall asleep but melatonin was given and pt stated that it was helpful. VSS stable and no acute events overnight.

## 2022-07-20 NOTE — PLAN OF CARE
CMICU DAILY GOALS       A: Awake    RASS: Goal -    Actual -     Restraint necessity:    B: Breathe   SBT: NA   C: Coordinate A & B, analgesics/sedatives   Pain: managed    SAT: NA  D: Delirium   CAM-ICU: Overall CAM-ICU: Negative  E: Early(intubated/ Progressive (non-intubated) Mobility   MOVE Screen: Fail   Activity: Activity Management: Patient unable to perform activities  FAS: Feeding/Nutrition   Diet order:  ,    T: Thrombus   DVT prophylaxis: VTE Required Core Measure: Pharmacological prophylaxis initiated/maintained  H: HOB Elevation   Head of Bed (HOB) Positioning: HOB at 30-45 degrees  U: Ulcer Prophylaxis   GI: no  G: Glucose control   managed Glycemic Management: blood glucose monitored  S: Skin   Bathing/Skin Care: bath, complete, linen changed  Device Skin Pressure Protection: pressure points protected  Pressure Reduction Devices: positioning supports utilized  Pressure Reduction Techniques: weight shift assistance provided  Skin Protection: adhesive use limited  B: Bowel Function   Bowl regimine Mon, wed, Fri    I: Indwelling Catheters   Waters necessity:     CVC necessity: No  D: De-escalation Antibiotics   No    Family/Goals of care/Code Status   Code Status: Full Code    24H Vital Sign Range  Temp:  [98.3 °F (36.8 °C)-99.3 °F (37.4 °C)]   Pulse:  []   Resp:  [10-27]   BP: (123-172)/()   SpO2:  [97 %-100 %]      Shift Events   CT of sinuses completed    VS and assessment per flow sheet, patient progressing towards goals as tolerated, plan of care reviewed with family, all concerns addressed, will continue to monitor.    Nat Clark

## 2022-07-20 NOTE — SUBJECTIVE & OBJECTIVE
Interval History/Significant Events: no acute events overnight. Resting well this morning. Partner at bedside reports much improvement overnight.     Review of Systems   Constitutional:  Negative for chills and fever.   HENT:  Positive for congestion and sinus pressure. Negative for postnasal drip and sinus pain.    Respiratory:  Positive for cough. Negative for shortness of breath.    Cardiovascular:  Negative for chest pain and leg swelling.   Gastrointestinal:  Positive for constipation. Negative for nausea.   Genitourinary:  Negative for hematuria and urgency.   Musculoskeletal:  Negative for joint swelling.   Neurological:  Negative for dizziness and numbness.   Psychiatric/Behavioral:  Negative for agitation and behavioral problems.    Objective:     Vital Signs (Most Recent):  Temp: 99.3 °F (37.4 °C) (07/20/22 0800)  Pulse: 82 (07/20/22 0700)  Resp: 18 (07/20/22 0700)  BP: (!) 157/93 (07/20/22 0700)  SpO2: 99 % (07/20/22 0700)   Vital Signs (24h Range):  Temp:  [98.3 °F (36.8 °C)-99.3 °F (37.4 °C)] 99.3 °F (37.4 °C)  Pulse:  [] 82  Resp:  [10-27] 18  SpO2:  [94 %-100 %] 99 %  BP: (123-212)/() 157/93   Weight: 119.3 kg (263 lb 0.1 oz)  Body mass index is 39.99 kg/m².      Intake/Output Summary (Last 24 hours) at 7/20/2022 0840  Last data filed at 7/20/2022 0758  Gross per 24 hour   Intake 2076.65 ml   Output 1325 ml   Net 751.65 ml       Physical Exam  Vitals and nursing note reviewed.   Constitutional:       General: He is not in acute distress.     Appearance: He is obese. He is ill-appearing.   HENT:      Head: Normocephalic and atraumatic.      Mouth/Throat:      Mouth: Mucous membranes are dry.      Pharynx: No oropharyngeal exudate.   Eyes:      General: No scleral icterus.     Pupils: Pupils are equal, round, and reactive to light.   Cardiovascular:      Rate and Rhythm: Normal rate and regular rhythm.      Heart sounds: No murmur heard.    No gallop.   Pulmonary:      Effort: No respiratory  distress.      Breath sounds: Decreased air movement present. No wheezing.   Abdominal:      General: Bowel sounds are normal. There is distension.      Palpations: Abdomen is soft.      Tenderness: There is no abdominal tenderness.   Musculoskeletal:      Right lower leg: Edema present.      Left lower leg: Edema present.      Comments: Improving edema   Neurological:      Mental Status: He is alert and oriented to person, place, and time. Mental status is at baseline.      GCS: GCS eye subscore is 4. GCS verbal subscore is 5. GCS motor subscore is 6.       Vents:  Oxygen Concentration (%): 50 (07/18/22 0900)  Lines/Drains/Airways       Drain  Duration                  Suprapubic Catheter -- days              Peripheral Intravenous Line  Duration                  Peripheral IV - Single Lumen 07/18/22 0215 22 G Left Wrist 2 days         Peripheral IV - Single Lumen 07/18/22 1348 Anterior;Distal;Right Upper Arm 1 day                  Significant Labs:    CBC/Anemia Profile:  Recent Labs   Lab 07/19/22  0212 07/20/22  0505   WBC 7.58 6.43   HGB 10.4* 10.2*   HCT 34.6* 33.9*    182   MCV 84 83   RDW 18.0* 18.2*        Chemistries:  Recent Labs   Lab 07/18/22  1325 07/19/22  0212 07/20/22  0505   * 134* 135*   K 4.5 4.2 4.0    103 103   CO2 24 24 25   BUN 19 17 13   CREATININE 0.9 0.8 0.8   CALCIUM 9.2 8.7 8.4*   ALBUMIN  --  2.9* 2.7*   PROT  --  6.0 6.2   BILITOT  --  0.8 0.9   ALKPHOS  --  112 113   ALT  --  43 28   AST  --  16 12   MG  --  1.9 1.9   PHOS  --  3.2 2.5*       All pertinent labs within the past 24 hours have been reviewed.    Significant Imaging:  I have reviewed all pertinent imaging results/findings within the past 24 hours.

## 2022-07-21 PROBLEM — R82.90 ABNORMAL URINALYSIS: Status: ACTIVE | Noted: 2022-07-21

## 2022-07-21 LAB
POCT GLUCOSE: 227 MG/DL (ref 70–110)
POCT GLUCOSE: 283 MG/DL (ref 70–110)
POCT GLUCOSE: 299 MG/DL (ref 70–110)

## 2022-07-21 PROCEDURE — 25000003 PHARM REV CODE 250: Performed by: INTERNAL MEDICINE

## 2022-07-21 PROCEDURE — 92610 EVALUATE SWALLOWING FUNCTION: CPT

## 2022-07-21 PROCEDURE — 25000003 PHARM REV CODE 250: Performed by: NURSE PRACTITIONER

## 2022-07-21 PROCEDURE — 27000207 HC ISOLATION

## 2022-07-21 PROCEDURE — 63600175 PHARM REV CODE 636 W HCPCS: Performed by: NURSE PRACTITIONER

## 2022-07-21 PROCEDURE — 99233 SBSQ HOSP IP/OBS HIGH 50: CPT | Mod: ,,, | Performed by: INTERNAL MEDICINE

## 2022-07-21 PROCEDURE — 99233 PR SUBSEQUENT HOSPITAL CARE,LEVL III: ICD-10-PCS | Mod: ,,, | Performed by: INTERNAL MEDICINE

## 2022-07-21 PROCEDURE — C9399 UNCLASSIFIED DRUGS OR BIOLOG: HCPCS | Performed by: INTERNAL MEDICINE

## 2022-07-21 PROCEDURE — 20600001 HC STEP DOWN PRIVATE ROOM

## 2022-07-21 RX ORDER — INSULIN ASPART 100 [IU]/ML
6 INJECTION, SOLUTION INTRAVENOUS; SUBCUTANEOUS
Status: DISCONTINUED | OUTPATIENT
Start: 2022-07-22 | End: 2022-07-22 | Stop reason: HOSPADM

## 2022-07-21 RX ORDER — SODIUM CHLORIDE FOR INHALATION 3 %
4 VIAL, NEBULIZER (ML) INHALATION EVERY 4 HOURS PRN
Qty: 400 ML | Status: SHIPPED | OUTPATIENT
Start: 2022-07-21 | End: 2022-07-22 | Stop reason: SDUPTHER

## 2022-07-21 RX ORDER — GLYCOPYRROLATE 1 MG/1
1 TABLET ORAL 3 TIMES DAILY PRN
Qty: 90 TABLET | Refills: 0 | Status: SHIPPED | OUTPATIENT
Start: 2022-07-21 | End: 2022-08-21

## 2022-07-21 RX ORDER — GUAIFENESIN, PSEUDOEPHEDRINE HYDROCHLORIDE 600; 60 MG/1; MG/1
1 TABLET, EXTENDED RELEASE ORAL 2 TIMES DAILY PRN
Qty: 60 TABLET | Status: SHIPPED | OUTPATIENT
Start: 2022-07-21 | End: 2022-07-21 | Stop reason: HOSPADM

## 2022-07-21 RX ADMIN — INSULIN DETEMIR 20 UNITS: 100 INJECTION, SOLUTION SUBCUTANEOUS at 10:07

## 2022-07-21 RX ADMIN — INSULIN ASPART 4 UNITS: 100 INJECTION, SOLUTION INTRAVENOUS; SUBCUTANEOUS at 08:07

## 2022-07-21 RX ADMIN — MUPIROCIN: 20 OINTMENT TOPICAL at 08:07

## 2022-07-21 RX ADMIN — METOPROLOL SUCCINATE 50 MG: 50 TABLET, EXTENDED RELEASE ORAL at 08:07

## 2022-07-21 RX ADMIN — INSULIN ASPART 6 UNITS: 100 INJECTION, SOLUTION INTRAVENOUS; SUBCUTANEOUS at 04:07

## 2022-07-21 RX ADMIN — INSULIN ASPART 4 UNITS: 100 INJECTION, SOLUTION INTRAVENOUS; SUBCUTANEOUS at 01:07

## 2022-07-21 RX ADMIN — OXYBUTYNIN CHLORIDE 5 MG: 5 TABLET ORAL at 03:07

## 2022-07-21 RX ADMIN — APIXABAN 5 MG: 5 TABLET, FILM COATED ORAL at 08:07

## 2022-07-21 RX ADMIN — GUAIFENESIN AND DEXTROMETHORPHAN HYDROBROMIDE 1 TABLET: 600; 30 TABLET, EXTENDED RELEASE ORAL at 08:07

## 2022-07-21 RX ADMIN — OXYBUTYNIN CHLORIDE 5 MG: 5 TABLET ORAL at 08:07

## 2022-07-21 RX ADMIN — INSULIN ASPART 5 UNITS: 100 INJECTION, SOLUTION INTRAVENOUS; SUBCUTANEOUS at 08:07

## 2022-07-21 RX ADMIN — ACETAMINOPHEN 1000 MG: 500 TABLET ORAL at 01:07

## 2022-07-21 NOTE — NURSING TRANSFER
Nursing Transfer Note      7/21/2022     Reason patient is being transferred: Pt being stepped down from ICU    Transfer From: 6085 to 340    Transfer via bed    Transported by Dima Mg and JENNIFER Glass    Medicines sent: Yes    Chart send with patient: Yes    Notified: pt friend at bedside    Report given to nurse Desire by Jayant ESCOBAR RN    Transfer with cardiac monitoring. Pt refused to wear O2 while transferring. All pt and pt friend belongings transferred with pt via chart to pt [facility] room

## 2022-07-21 NOTE — PLAN OF CARE
Problem: Adult Inpatient Plan of Care  Goal: Plan of Care Review  Outcome: Ongoing, Progressing  Goal: Patient-Specific Goal (Individualized)  Outcome: Ongoing, Progressing  Goal: Absence of Hospital-Acquired Illness or Injury  Outcome: Ongoing, Progressing  Goal: Optimal Comfort and Wellbeing  Outcome: Ongoing, Progressing  Goal: Readiness for Transition of Care  Outcome: Ongoing, Progressing     Problem: Impaired Wound Healing  Goal: Optimal Wound Healing  Outcome: Ongoing, Progressing     Problem: Skin Injury Risk Increased  Goal: Skin Health and Integrity  Outcome: Ongoing, Progressing     Problem: Diabetes Comorbidity  Goal: Blood Glucose Level Within Targeted Range  Outcome: Ongoing, Progressing     Problem: Adjustment to Illness (Sepsis/Septic Shock)  Goal: Optimal Coping  Outcome: Ongoing, Progressing     Problem: Bleeding (Sepsis/Septic Shock)  Goal: Absence of Bleeding  Outcome: Ongoing, Progressing     Problem: Glycemic Control Impaired (Sepsis/Septic Shock)  Goal: Blood Glucose Level Within Desired Range  Outcome: Ongoing, Progressing     Problem: Infection Progression (Sepsis/Septic Shock)  Goal: Absence of Infection Signs and Symptoms  Outcome: Ongoing, Progressing     Problem: Nutrition Impaired (Sepsis/Septic Shock)  Goal: Optimal Nutrition Intake  Outcome: Ongoing, Progressing     Problem: Bariatric Environmental Safety  Goal: Safety Maintained with Care  Outcome: Ongoing, Progressing     Problem: Infection  Goal: Absence of Infection Signs and Symptoms  Outcome: Ongoing, Progressing     Problem: Fall Injury Risk  Goal: Absence of Fall and Fall-Related Injury  Outcome: Ongoing, Progressing

## 2022-07-21 NOTE — PLAN OF CARE
Pt AAOx4. VSS. Remains on O2 2L NC.No complaints of pain at this time. Plan of care discussed with patient.  Patient remains free of falls and trauma. Fall precautions in place. Discussed medications and care. Patient has no questions at this time. Family remains at bedside attentive to pt. Will continue to monitor.

## 2022-07-21 NOTE — PLAN OF CARE
Clinical swallow evaluation completed. Recommend a regular diet with thin liquids and meds whole. No additional skilled speech services required at this time.

## 2022-07-21 NOTE — PROGRESS NOTES
VS indicated pt hypertensive. Checked on pt, no s/s of distress, no c/o of headache. AAOx4. Rechecked BP, back at baseline. Team notified.

## 2022-07-21 NOTE — PROGRESS NOTES
Attempted to wean pt to RA, pt did not tolerate sat dropped to 85%. Placed back on 2lpm nc with documented sats. Will cont to monitor.

## 2022-07-22 VITALS
RESPIRATION RATE: 18 BRPM | HEART RATE: 129 BPM | SYSTOLIC BLOOD PRESSURE: 123 MMHG | DIASTOLIC BLOOD PRESSURE: 83 MMHG | HEIGHT: 68 IN | WEIGHT: 276.69 LBS | TEMPERATURE: 98 F | OXYGEN SATURATION: 96 % | BODY MASS INDEX: 41.93 KG/M2

## 2022-07-22 LAB
ALBUMIN SERPL BCP-MCNC: 2.9 G/DL (ref 3.5–5.2)
ALP SERPL-CCNC: 128 U/L (ref 55–135)
ALT SERPL W/O P-5'-P-CCNC: 31 U/L (ref 10–44)
ANION GAP SERPL CALC-SCNC: 8 MMOL/L (ref 8–16)
AST SERPL-CCNC: 18 U/L (ref 10–40)
BASOPHILS # BLD AUTO: 0.05 K/UL (ref 0–0.2)
BASOPHILS NFR BLD: 0.8 % (ref 0–1.9)
BILIRUB SERPL-MCNC: 0.7 MG/DL (ref 0.1–1)
BUN SERPL-MCNC: 14 MG/DL (ref 6–20)
CALCIUM SERPL-MCNC: 8.9 MG/DL (ref 8.7–10.5)
CHLORIDE SERPL-SCNC: 101 MMOL/L (ref 95–110)
CO2 SERPL-SCNC: 25 MMOL/L (ref 23–29)
CREAT SERPL-MCNC: 0.8 MG/DL (ref 0.5–1.4)
DIFFERENTIAL METHOD: ABNORMAL
EOSINOPHIL # BLD AUTO: 0.3 K/UL (ref 0–0.5)
EOSINOPHIL NFR BLD: 4.7 % (ref 0–8)
ERYTHROCYTE [DISTWIDTH] IN BLOOD BY AUTOMATED COUNT: 17.7 % (ref 11.5–14.5)
EST. GFR  (AFRICAN AMERICAN): >60 ML/MIN/1.73 M^2
EST. GFR  (NON AFRICAN AMERICAN): >60 ML/MIN/1.73 M^2
GLUCOSE SERPL-MCNC: 267 MG/DL (ref 70–110)
HCT VFR BLD AUTO: 35.9 % (ref 40–54)
HGB BLD-MCNC: 11 G/DL (ref 14–18)
IMM GRANULOCYTES # BLD AUTO: 0.02 K/UL (ref 0–0.04)
IMM GRANULOCYTES NFR BLD AUTO: 0.3 % (ref 0–0.5)
LYMPHOCYTES # BLD AUTO: 1.9 K/UL (ref 1–4.8)
LYMPHOCYTES NFR BLD: 29.7 % (ref 18–48)
MAGNESIUM SERPL-MCNC: 1.8 MG/DL (ref 1.6–2.6)
MCH RBC QN AUTO: 25.4 PG (ref 27–31)
MCHC RBC AUTO-ENTMCNC: 30.6 G/DL (ref 32–36)
MCV RBC AUTO: 83 FL (ref 82–98)
MONOCYTES # BLD AUTO: 0.4 K/UL (ref 0.3–1)
MONOCYTES NFR BLD: 6.8 % (ref 4–15)
NEUTROPHILS # BLD AUTO: 3.6 K/UL (ref 1.8–7.7)
NEUTROPHILS NFR BLD: 57.7 % (ref 38–73)
NRBC BLD-RTO: 0 /100 WBC
PHOSPHATE SERPL-MCNC: 2.4 MG/DL (ref 2.7–4.5)
PLATELET # BLD AUTO: 164 K/UL (ref 150–450)
PMV BLD AUTO: 11.5 FL (ref 9.2–12.9)
POCT GLUCOSE: 220 MG/DL (ref 70–110)
POCT GLUCOSE: 263 MG/DL (ref 70–110)
POCT GLUCOSE: 270 MG/DL (ref 70–110)
POCT GLUCOSE: 354 MG/DL (ref 70–110)
POTASSIUM SERPL-SCNC: 4.4 MMOL/L (ref 3.5–5.1)
PROT SERPL-MCNC: 6.8 G/DL (ref 6–8.4)
RBC # BLD AUTO: 4.33 M/UL (ref 4.6–6.2)
SODIUM SERPL-SCNC: 134 MMOL/L (ref 136–145)
WBC # BLD AUTO: 6.32 K/UL (ref 3.9–12.7)

## 2022-07-22 PROCEDURE — 99239 HOSP IP/OBS DSCHRG MGMT >30: CPT | Mod: ,,, | Performed by: INTERNAL MEDICINE

## 2022-07-22 PROCEDURE — 36415 COLL VENOUS BLD VENIPUNCTURE: CPT | Performed by: NURSE PRACTITIONER

## 2022-07-22 PROCEDURE — 83735 ASSAY OF MAGNESIUM: CPT | Performed by: NURSE PRACTITIONER

## 2022-07-22 PROCEDURE — 85025 COMPLETE CBC W/AUTO DIFF WBC: CPT | Performed by: NURSE PRACTITIONER

## 2022-07-22 PROCEDURE — 63600175 PHARM REV CODE 636 W HCPCS: Performed by: INTERNAL MEDICINE

## 2022-07-22 PROCEDURE — 84100 ASSAY OF PHOSPHORUS: CPT | Performed by: NURSE PRACTITIONER

## 2022-07-22 PROCEDURE — 99239 PR HOSPITAL DISCHARGE DAY,>30 MIN: ICD-10-PCS | Mod: ,,, | Performed by: INTERNAL MEDICINE

## 2022-07-22 PROCEDURE — 80053 COMPREHEN METABOLIC PANEL: CPT | Performed by: NURSE PRACTITIONER

## 2022-07-22 PROCEDURE — 25000003 PHARM REV CODE 250: Performed by: NURSE PRACTITIONER

## 2022-07-22 RX ORDER — SODIUM CHLORIDE FOR INHALATION 3 %
4 VIAL, NEBULIZER (ML) INHALATION EVERY 4 HOURS PRN
Qty: 400 ML | Status: ON HOLD | OUTPATIENT
Start: 2022-07-22 | End: 2023-05-25

## 2022-07-22 RX ADMIN — ACETAMINOPHEN 1000 MG: 500 TABLET ORAL at 12:07

## 2022-07-22 RX ADMIN — INSULIN ASPART 6 UNITS: 100 INJECTION, SOLUTION INTRAVENOUS; SUBCUTANEOUS at 08:07

## 2022-07-22 RX ADMIN — OXYBUTYNIN CHLORIDE 5 MG: 5 TABLET ORAL at 08:07

## 2022-07-22 RX ADMIN — GUAIFENESIN AND DEXTROMETHORPHAN HYDROBROMIDE 1 TABLET: 600; 30 TABLET, EXTENDED RELEASE ORAL at 08:07

## 2022-07-22 RX ADMIN — APIXABAN 5 MG: 5 TABLET, FILM COATED ORAL at 08:07

## 2022-07-22 RX ADMIN — FLUTICASONE PROPIONATE 100 MCG: 50 SPRAY, METERED NASAL at 08:07

## 2022-07-22 RX ADMIN — MUPIROCIN: 20 OINTMENT TOPICAL at 08:07

## 2022-07-22 RX ADMIN — METOPROLOL SUCCINATE 50 MG: 50 TABLET, EXTENDED RELEASE ORAL at 08:07

## 2022-07-22 RX ADMIN — INSULIN ASPART 4 UNITS: 100 INJECTION, SOLUTION INTRAVENOUS; SUBCUTANEOUS at 08:07

## 2022-07-22 RX ADMIN — Medication 1 ENEMA: at 09:07

## 2022-07-22 RX ADMIN — ACETAMINOPHEN 1000 MG: 500 TABLET ORAL at 08:07

## 2022-07-22 NOTE — PT/OT/SLP PROGRESS
Physical Therapy  screen      Patient Name:  Antonio Galvan II   MRN:  [id number]    Patient seen in room  (along w/ OT). Patient family present. Patient is quadraplegic. Bed and wheelchair dependent. He has DME in place and per family will discharge home today. He has 24/7 assistance at home. Patient is at baseline for mobiltiy and no acute care PT needs. No units charged.

## 2022-07-22 NOTE — ASSESSMENT & PLAN NOTE
Acute hypoxic respiratory failure  56 year old male presented to OSH s/p cardiac arrest with ROSC achieved after 2 mins per EMS report. Respiratory etiology (aspiration vs mucous plug) likely. Patient is awake, alert, and oriented after arrest. Weaned from BiPap after arrival. Started on broad spectrum antibiotics. Infectious work up is negative. Antibiotics stopped. He had unremarkable cardiac work up with troponin and BNP within normal limts. ECHO showed EF 40-45%, moderately reduced right ventricular systolic function  He received aggressive pulm toileting in setting of quadriplegia. He was treat with mucolytics and flonase. Per critical care staff, cough assist was not helpful and felt secretions were upper airway related. CT sinus showed mild sinus disease. Speech evaluated patient found now aspiration risk. NT suction prn secretion management. Will add robinul prn secreations.

## 2022-07-22 NOTE — PROGRESS NOTES
Hospital Medicine  Progress note    Team: [facility] Reina Montgomery MD  Admit Date: 7/18/2022    Principal Problem:  Cardiac arrest    Interval hx: Patient feeling well but wants to go home. He has not sat up in a chair since admission    ROS   Respiratory: neg for cough neg for shortness of breath  Cardiovascular: neg for chest pain neg for palpitations  Gastrointestinal: neg for nausea neg for vomiting, neg for abdominal pain neg for diarrhea neg for constipation   Behavioral/Psych: neg for depression neg for anxiety    PEx  Temp:  [97.5 °F (36.4 °C)-99.1 °F (37.3 °C)]   Pulse:  []   Resp:  [12-18]   BP: (120-230)/()   SpO2:  [90 %-100 %]     Intake/Output Summary (Last 24 hours) at 7/21/2022 2017  Last data filed at 7/21/2022 1700  Gross per 24 hour   Intake 1212 ml   Output 1326 ml   Net -114 ml     General Appearance: no acute distress, WD, obese  Heart: regular rate and rhythm, no heave  Respiratory: Normal respiratory effort, symmetric excursion, bilateral vesicular breath sounds   Abdomen: Soft, non-tender; bowel sounds active  Skin: intact, no rash, no ulcers  Neurologic:  No focal numbness or weakness  Mental status: Alert, oriented x 4, affect appropriate     Recent Labs   Lab 07/18/22 0229 07/19/22 0212 07/20/22  0505   WBC 10.4 7.58 6.43   HGB 12.7* 10.4* 10.2*   HCT 43.4 34.6* 33.9*    190 182     Recent Labs   Lab 07/18/22 0229 07/18/22  1325 07/19/22 0212 07/20/22  0505    134* 134* 135*   K 5.5* 4.5 4.2 4.0   CL  --  102 103 103   CO2 23 24 24 25   BUN 18.6 19 17 13   CREATININE 1.04 0.9 0.8 0.8   GLU  --  216* 163* 187*   CALCIUM 9.2 9.2 8.7 8.4*   MG 2.10  --  1.9 1.9   PHOS 4.7  --  3.2 2.5*     Recent Labs   Lab 07/18/22 0229 07/19/22 0212 07/20/22  0505   ALKPHOS 146 112 113   ALT 70* 43 28   AST 69* 16 12   ALBUMIN 3.4* 2.9* 2.7*   PROT  --  6.0 6.2   BILITOT 0.6 0.8 0.9   INR 1.16  --   --         Recent Labs   Lab 07/20/22  0542 07/20/22  0752 07/20/22  1202  07/20/22  1708 07/21/22  1231 07/21/22  1658   POCTGLUCOSE 205* 184* 187* 299* 227* 283*       Scheduled Meds:   apixaban  5 mg Oral BID    dextromethorphan-guaiFENesin  mg  1 tablet Oral BID    fluticasone propionate  2 spray Each Nostril Daily    metoprolol succinate  50 mg Oral BID    mupirocin   Nasal BID    oxybutynin  5 mg Oral TID    sodium phosphates  1 enema Rectal Every Mon, Wed, Fri     Continuous Infusions:  As Needed:  acetaminophen, dextrose 10%, dextrose 10%, glucagon (human recombinant), glucose, glucose, insulin aspart U-100, melatonin, ondansetron, sodium chloride 0.9%, sodium chloride 3%    Assessment and Plan  / Problems managed today    * Cardiac arrest  Acute hypoxic respiratory failure  56 year old male presented to OSH s/p cardiac arrest with ROSC achieved after 2 mins per EMS report. Respiratory etiology (aspiration vs mucous plug) likely. Patient is awake, alert, and oriented after arrest. Weaned from BiPap after arrival. Started on broad spectrum antibiotics. Infectious work up is negative. Antibiotics stopped. He had unremarkable cardiac work up with troponin and BNP within normal limts. ECHO showed EF 40-45%, moderately reduced right ventricular systolic function  He received aggressive pulm toileting in setting of quadriplegia. He was treat with mucolytics and flonase. Per critical care staff, cough assist was not helpful and felt secretions were upper airway related. CT sinus showed mild sinus disease. Speech evaluated patient found now aspiration risk. NT suction prn secretion management. Will add robinul prn secreations.    Abnormal urinalysis  UA abnormal  Suprapubic catheter exchanged  ID consulted    Neurogenic bladder  Resume home oxybutynin     Constipation  -- MWF enema     Atrial flutter  -- Continuous telemetry   -- EKG for acute changes   -- Continue Toprol 50mg BID     Uncontrolled type 2 diabetes mellitus with hyperglycemia  Home regimen: 100u Lantus QHS,  Novolin R with meals   -- q6h accu checks with SSI   -- Inpatient goal 140-180  -- A1C 8.7 this admission   -- Diabetic diet  -- levemir 20 units subq qhs and novolog 6 units subq TID with meals    Discharge Planning   SADE: 7/22/2022     Code Status: Full Code   Is the patient medically ready for discharge?:     Reason for patient still in hospital (select all that apply): PT / OT recommendations and Pending disposition  Discharge Plan A: Home      Diet:  diabetic diet  GI PPx: not needed  DVT PPx:  apixaban  Airways: room air  Wounds: none    Goals of Care:  Return to prior functional status       Time (minutes) spent in care of the patient (Greater than 1/2 spent in direct face-to-face contact and care coordination on unit)  35 min     Reina Montgomery MD

## 2022-07-22 NOTE — PLAN OF CARE
[facility]iology Stepdown  Discharge Final Note    Primary Care Provider: Jennifer Fernando NP    Expected Discharge Date: 7/22/2022    Final Discharge Note (most recent)     Final Note - 07/22/22 1643        Final Note    Assessment Type Final Discharge Note     Anticipated Discharge Disposition Home or Self Care               Ophelia Nieves LMSW  [facility]  [id number]      Important Message from Medicare  Important Message from Medicare regarding Discharge Appeal Rights: Given to patient/caregiver, Explained to patient/caregiver, Signed/date by patient/caregiver, Other (comments) (Patients mother at bedside and signed consent)     Date IMM was signed: 07/22/22  Time IMM was signed: 1049      Future Appointments   Date Time Provider Department Center   8/2/2022 11:00 AM PROVIDER, [facility] OP WOUND CARE [facility] OPW[facility]       Contact Info     Jennifer Fernando NP   Specialty: Family Medicine   Relationship: PCP - General    [address] [place] [id number]   Phone: [phone]       Next Steps: Schedule an appointment as soon as possible for a visit on 7/29/2022    Instructions: Hospital follow up in 1 week

## 2022-07-22 NOTE — ASSESSMENT & PLAN NOTE
Home regimen: 100u Lantus QHS, Novolin R with meals   -- q6h accu checks with SSI   -- Inpatient goal 140-180  -- A1C 8.7 this admission   -- Diabetic diet  -- levemir 20 units subq qhs and novolog 6 units subq TID with meals

## 2022-07-22 NOTE — PLAN OF CARE
Problem: Adult Inpatient Plan of Care  Goal: Plan of Care Review  Outcome: Ongoing, Progressing     Problem: Impaired Wound Healing  Goal: Optimal Wound Healing  Outcome: Ongoing, Progressing  Intervention: Promote Wound Healing  Flowsheets (Taken 7/21/2022 2230)  Sleep/Rest Enhancement: awakenings minimized  Activity Management: Patient unable to perform activities     Problem: Skin Injury Risk Increased  Goal: Skin Health and Integrity  Outcome: Ongoing, Progressing  Intervention: Optimize Skin Protection  Flowsheets (Taken 7/21/2022 2230)  Pressure Reduction Techniques: frequent weight shift encouraged  Skin Protection: adhesive use limited  Head of Bed (HOB) Positioning: HOB elevated     Problem: Diabetes Comorbidity  Goal: Blood Glucose Level Within Targeted Range  Outcome: Ongoing, Progressing  Intervention: Monitor and Manage Glycemia  Flowsheets (Taken 7/21/2022 2230)  Glycemic Management: blood glucose monitored     Problem: Infection  Goal: Absence of Infection Signs and Symptoms  Outcome: Ongoing, Progressing  Intervention: Prevent or Manage Infection  Flowsheets (Taken 7/21/2022 2230)  Infection Management: aseptic technique maintained  Isolation Precautions:   contact   precautions maintained     Problem: Fall Injury Risk  Goal: Absence of Fall and Fall-Related Injury  Outcome: Ongoing, Progressing  Intervention: Identify and Manage Contributors  Flowsheets (Taken 7/21/2022 2230)  Self-Care Promotion: independence encouraged  Medication Review/Management: medications reviewed  Intervention: Promote Injury-Free Environment  Flowsheets (Taken 7/21/2022 2230)  Safety Promotion/Fall Prevention:   assistive device/personal item within reach   side rails raised x 2   nonskid shoes/socks when out of bed   lighting adjusted   medications reviewed

## 2022-07-22 NOTE — PROGRESS NOTES
Occupational Therapy  Screen  Antonio Galvan II  MRN: [id number]    OT stopped by pt's room for assessment/interview. Pt is at his baseline being dependent for all ADL and transfers with 24/7 hired assistants. DME needs in place. Skilled OT services not warranted        KAVIN Vasquez  7/22/2022

## 2022-07-23 LAB
BACTERIA BLD CULT: NORMAL
BACTERIA BLD CULT: NORMAL
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
## 2022-07-27 DIAGNOSIS — J31.0 CHRONIC RHINITIS: Primary | ICD-10-CM

## 2022-08-02 ENCOUNTER — HOSPITAL ENCOUNTER (OUTPATIENT)
Dept: WOUND CARE | Facility: HOSPITAL | Age: 57
Discharge: HOME | End: 2022-08-02
Attending: PEDIATRICS
Payer: MEDICARE

## 2022-08-02 VITALS
HEART RATE: 91 BPM | RESPIRATION RATE: 20 BRPM | DIASTOLIC BLOOD PRESSURE: 89 MMHG | SYSTOLIC BLOOD PRESSURE: 154 MMHG | OXYGEN SATURATION: 96 % | TEMPERATURE: 98 F

## 2022-08-02 DIAGNOSIS — L89.023: ICD-10-CM

## 2022-08-02 DIAGNOSIS — L89.613 PRESSURE INJURY OF RIGHT HEEL, STAGE 3: ICD-10-CM

## 2022-08-02 DIAGNOSIS — L02.419 ABSCESS OF CALF: ICD-10-CM

## 2022-08-02 PROCEDURE — 99213 PR OFFICE/OUTPT VISIT, EST, LEVL III, 20-29 MIN: ICD-10-PCS | Mod: ,,, | Performed by: PEDIATRICS

## 2022-08-02 PROCEDURE — 99213 OFFICE O/P EST LOW 20 MIN: CPT

## 2022-08-02 PROCEDURE — 99213 OFFICE O/P EST LOW 20 MIN: CPT | Mod: ,,, | Performed by: PEDIATRICS

## 2022-08-02 NOTE — PROGRESS NOTES
Subjective:       Patient ID: Antonio Galvan II is a 56 y.o. male.    Chief Complaint: Pressure Ulcer (Follow up with md.  MALINDA heel, R posterior lower leg, left elbow)    HPI  Review of Systems      Objective:      Temp:  [98.4 °F (36.9 °C)]   Pulse:  [91]   Resp:  [20]   BP: (154)/(89)   SpO2:  [96 %]   Physical Exam       Altered Skin Integrity 05/06/22 1140 Right Heel  Full thickness tissue loss. Subcutaneous fat may be visible but bone, tendon or muscle are not exposed (Active)   05/06/22 1140   Altered Skin Integrity Present on Admission:    Side: Right   Orientation:    Location: Heel   Wound Number:    Is this injury device related?: No   Primary Wound Type:    Description of Altered Skin Integrity: Full thickness tissue loss. Subcutaneous fat may be visible but bone, tendon or muscle are not exposed   Removal Indication and Assessment:    Wound Outcome:    (Retired) Wound Length (cm):    (Retired) Wound Width (cm):    (Retired) Depth (cm):    Wound Description (Comments):    Removal Indications:    Wound Image   08/02/22 1200   Dressing Appearance Intact;Moist drainage 08/02/22 1200   Drainage Amount Moderate 08/02/22 1200   Drainage Characteristics/Odor Serosanguineous;No odor;Bleeding controlled 08/02/22 1200   Appearance Red;Granulating 08/02/22 1200   Red (%), Wound Tissue Color 100 % 08/02/22 1200   Wound Edges Irregular 08/02/22 1200   Wound Length (cm) 5.8 cm 08/02/22 1200   Wound Width (cm) 2.3 cm 08/02/22 1200   Wound Depth (cm) 0.1 cm 08/02/22 1200   Wound Volume (cm^3) 1.334 cm^3 08/02/22 1200   Wound Surface Area (cm^2) 13.34 cm^2 08/02/22 1200   Care Cleansed with:;Antimicrobial agent 08/02/22 1200   Dressing Applied;Hydrofiber;Silver;Gauze;Absorptive Pad;Rolled gauze 08/02/22 1200   Periwound Care Skin barrier film applied 08/02/22 1200   Compression Tubular elasticized bandage 08/02/22 1200            Altered Skin Integrity 05/06/22 1147 Right posterior Calf Abscess Full thickness tissue  loss. Subcutaneous fat may be visible but bone, tendon or muscle are not exposed (Active)   05/06/22 1147   Altered Skin Integrity Present on Admission:    Side: Right   Orientation: posterior   Location: Calf   Wound Number:    Is this injury device related?: No   Primary Wound Type: Abscess   Description of Altered Skin Integrity: Full thickness tissue loss. Subcutaneous fat may be visible but bone, tendon or muscle are not exposed   Removal Indication and Assessment:    Wound Outcome:    (Retired) Wound Length (cm):    (Retired) Wound Width (cm):    (Retired) Depth (cm):    Wound Description (Comments):    Removal Indications:    Wound Image   08/02/22 1200   Dressing Appearance Intact;Moist drainage 08/02/22 1200   Drainage Amount Moderate 08/02/22 1200   Drainage Characteristics/Odor Serosanguineous;No odor;Bleeding controlled 08/02/22 1200   Appearance Pink;Red;Granulating 08/02/22 1200   Tissue loss description Full thickness 08/02/22 1200   Red (%), Wound Tissue Color 100 % 08/02/22 1200   Periwound Area Intact 08/02/22 1200   Wound Edges Defined 08/02/22 1200   Wound Length (cm) 1.4 cm 08/02/22 1200   Wound Width (cm) 2.8 cm 08/02/22 1200   Wound Depth (cm) 0.5 cm 08/02/22 1200   Wound Volume (cm^3) 1.96 cm^3 08/02/22 1200   Wound Surface Area (cm^2) 3.92 cm^2 08/02/22 1200   Care Cleansed with:;Antimicrobial agent 08/02/22 1200   Dressing Applied;Hydrofiber;Silver;Gauze;Other (comment) 08/02/22 1200            Altered Skin Integrity Left posterior Arm Other (comment) (Active)       Altered Skin Integrity Present on Admission:    Side: Left   Orientation: posterior   Location: Arm   Wound Number:    Is this injury device related?:    Primary Wound Type: Other   Description of Altered Skin Integrity:    Removal Indication and Assessment:    Wound Outcome: Other   (Retired) Wound Length (cm):    (Retired) Wound Width (cm):    (Retired) Depth (cm):    Wound Description (Comments):    Removal Indications:     Wound Image   08/02/22 1200   Dressing Appearance Intact;Dry;Clean 08/02/22 1200   Drainage Amount None 08/02/22 1200   Drainage Characteristics/Odor No odor 08/02/22 1200   Appearance Closed/resurfaced 08/02/22 1200   Periwound Area Intact;Scar tissue 08/02/22 1200   Wound Length (cm) 0 cm 08/02/22 1200   Wound Width (cm) 0 cm 08/02/22 1200   Wound Depth (cm) 0 cm 08/02/22 1200   Wound Volume (cm^3) 0 cm^3 08/02/22 1200   Wound Surface Area (cm^2) 0 cm^2 08/02/22 1200   Care Cleansed with:;Sterile normal saline 08/02/22 1200   Dressing Applied;Bandaid 08/02/22 1200         Assessment:      Today theelbow on the left is healed and should be covered with bandade to protect skin. Right heel is granulating well with decresaed bleeding and this was cleaned and opticle ag applied and gauze and tape. The right calf abscess is smaller and was packed with Opticell ag and gauze and tape.This will be changed daily and pt will return in 2 weeks.    ICD-10-CM ICD-9-CM   1. Pressure injury of right heel, stage 3  L89.613 707.07     707.23   2. Pressure injury of left elbow, stage 3  L89.023 707.01     707.23   3. Abscess of calf  L02.419 682.6         Plan:   Tissue pathology and/or culture taken:  [] Yes [] No   Sharp debridement performed:   [] Yes [] No   Labs ordered this visit:   [] Yes [] No   Imaging ordered this visit:   [] Yes [] No           Orders Placed This Encounter   Procedures    Change dressing     Cleanse wound with: Vashe  Periwound care: skin prep periwound , allow to dry well  Primary dressing: Opticel AG to open wounds  Secondary dressing: gauze, abd prn, kerlix to heel, gauze and cover dressing to leg, bandaid to elbow  Offloading: elevate for edema control, keep pressure off posterior calf/heel  Edema control: Tubigrip E  Frequency: Change daily  Follow-up: 2 weeks     Standing Status:   Standing     Number of Occurrences:   1     Standing Expiration Date:   8/2/2022        Follow up in about 2 weeks  (around 8/16/2022).

## 2022-08-02 NOTE — PATIENT INSTRUCTIONS
Cleanse wound with: Vashe   Periwound care: skin prep periwound , allow to dry well   Primary dressing: apply Opticel Ag over open area of heel and posterior lower leg,   Secondary dressing: cover with gauze, abd prn, kerlix to heel, gauze and cover dressing to leg, bandaid to elbow   Offloading: elevate for edema control, keep pressure off posterior calf/heel   Edema control: Tubigrip E   Frequency: Change daily   Follow-up: 2 weeks

## 2022-08-09 ENCOUNTER — OFFICE VISIT (OUTPATIENT)
Dept: NEUROLOGY | Facility: CLINIC | Age: 57
End: 2022-08-09
Payer: MEDICARE

## 2022-08-09 VITALS
DIASTOLIC BLOOD PRESSURE: 84 MMHG | BODY MASS INDEX: 41.83 KG/M2 | WEIGHT: 276 LBS | SYSTOLIC BLOOD PRESSURE: 128 MMHG | HEIGHT: 68 IN

## 2022-08-09 DIAGNOSIS — G47.33 OBSTRUCTIVE SLEEP APNEA SYNDROME: Primary | ICD-10-CM

## 2022-08-09 DIAGNOSIS — J31.0 CHRONIC RHINITIS: ICD-10-CM

## 2022-08-09 PROCEDURE — 99999 PR PBB SHADOW E&M-EST. PATIENT-LVL III: ICD-10-PCS | Mod: PBBFAC,,, | Performed by: SPECIALIST

## 2022-08-09 PROCEDURE — 99204 OFFICE O/P NEW MOD 45 MIN: CPT | Mod: S$PBB,,, | Performed by: SPECIALIST

## 2022-08-09 PROCEDURE — 99204 PR OFFICE/OUTPT VISIT, NEW, LEVL IV, 45-59 MIN: ICD-10-PCS | Mod: S$PBB,,, | Performed by: SPECIALIST

## 2022-08-09 PROCEDURE — 99213 OFFICE O/P EST LOW 20 MIN: CPT | Mod: PBBFAC | Performed by: SPECIALIST

## 2022-08-09 PROCEDURE — 99999 PR PBB SHADOW E&M-EST. PATIENT-LVL III: CPT | Mod: PBBFAC,,, | Performed by: SPECIALIST

## 2022-08-09 RX ORDER — INSULIN GLARGINE 100 [IU]/ML
INJECTION, SOLUTION SUBCUTANEOUS
Status: ON HOLD | COMMUNITY
End: 2022-09-07 | Stop reason: CLARIF

## 2022-08-09 RX ORDER — IMIPRAMINE HYDROCHLORIDE 25 MG/1
25 TABLET, FILM COATED ORAL NIGHTLY
Status: ON HOLD | COMMUNITY
End: 2022-09-07
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
## 2022-08-12 ENCOUNTER — PROCEDURE VISIT (OUTPATIENT)
Dept: SLEEP MEDICINE | Facility: HOSPITAL | Age: 57
End: 2022-08-12
Attending: SPECIALIST
Payer: MEDICARE

## 2022-08-12 DIAGNOSIS — G47.33 OBSTRUCTIVE SLEEP APNEA SYNDROME: Primary | ICD-10-CM

## 2022-08-12 PROCEDURE — 95806 PR SLEEP STUDY, UNATTENDED, SIMUL RECORD HR/O2 SAT/RESP FLOW/RESP EFFT: ICD-10-PCS | Mod: 26,,, | Performed by: SPECIALIST

## 2022-08-12 PROCEDURE — 95806 SLEEP STUDY UNATT&RESP EFFT: CPT | Mod: 26,,, | Performed by: SPECIALIST

## 2022-08-12 PROCEDURE — 95806 SLEEP STUDY UNATT&RESP EFFT: CPT

## 2022-08-16 ENCOUNTER — HOSPITAL ENCOUNTER (OUTPATIENT)
Dept: WOUND CARE | Facility: HOSPITAL | Age: 57
Discharge: HOME | End: 2022-08-16
Attending: PEDIATRICS
Payer: MEDICARE

## 2022-08-16 VITALS
TEMPERATURE: 98 F | HEART RATE: 63 BPM | DIASTOLIC BLOOD PRESSURE: 59 MMHG | OXYGEN SATURATION: 95 % | SYSTOLIC BLOOD PRESSURE: 93 MMHG | RESPIRATION RATE: 18 BRPM

## 2022-08-16 DIAGNOSIS — L02.419 ABSCESS OF CALF: Primary | ICD-10-CM

## 2022-08-16 DIAGNOSIS — L89.613 PRESSURE INJURY OF RIGHT HEEL, STAGE 3: ICD-10-CM

## 2022-08-16 PROCEDURE — 99213 OFFICE O/P EST LOW 20 MIN: CPT

## 2022-08-16 PROCEDURE — 99213 PR OFFICE/OUTPT VISIT, EST, LEVL III, 20-29 MIN: ICD-10-PCS | Mod: ,,, | Performed by: PEDIATRICS

## 2022-08-16 PROCEDURE — 99213 OFFICE O/P EST LOW 20 MIN: CPT | Mod: ,,, | Performed by: PEDIATRICS

## 2022-08-16 NOTE — PROGRESS NOTES
Subjective:       Patient ID: Antonio Galvan II is a 56 y.o. male.    Chief Complaint: Non-healing Wound Follow Up and Pressure Ulcer (R posterior lower leg, R heel pressure)    HPI  Review of Systems      Objective:      Temp:  [98.1 °F (36.7 °C)]   Pulse:  [63]   Resp:  [18]   BP: (93)/(59)   SpO2:  [95 %]   Physical Exam       Altered Skin Integrity 05/06/22 1140 Right Heel  Full thickness tissue loss. Subcutaneous fat may be visible but bone, tendon or muscle are not exposed (Active)   05/06/22 1140   Altered Skin Integrity Present on Admission:    Side: Right   Orientation:    Location: Heel   Wound Number:    Is this injury device related?: No   Primary Wound Type:    Description of Altered Skin Integrity: Full thickness tissue loss. Subcutaneous fat may be visible but bone, tendon or muscle are not exposed   Removal Indication and Assessment:    Wound Outcome:    (Retired) Wound Length (cm):    (Retired) Wound Width (cm):    (Retired) Depth (cm):    Wound Description (Comments):    Removal Indications:    Wound Image   08/16/22 1100   Dressing Appearance Intact;Moist drainage 08/16/22 1100   Drainage Amount Moderate 08/16/22 1100   Drainage Characteristics/Odor Serosanguineous;No odor 08/16/22 1100   Appearance Red;Granulating 08/16/22 1100   Red (%), Wound Tissue Color 100 % 08/16/22 1100   Wound Edges Irregular 08/16/22 1100   Wound Length (cm) 5 cm 08/16/22 1100   Wound Width (cm) 2 cm 08/16/22 1100   Wound Depth (cm) 0.1 cm 08/16/22 1100   Wound Volume (cm^3) 1 cm^3 08/16/22 1100   Wound Surface Area (cm^2) 10 cm^2 08/16/22 1100   Care Cleansed with:;Antimicrobial agent 08/16/22 1100   Dressing Applied;Hydrofiber;Silver;Gauze;Absorptive Pad;Rolled gauze 08/16/22 1100   Periwound Care Skin barrier film applied 08/16/22 1100   Compression Tubular elasticized bandage;Other (see comments) 08/16/22 1100            Altered Skin Integrity 05/06/22 1147 Right posterior Calf Abscess Full thickness tissue  loss. Subcutaneous fat may be visible but bone, tendon or muscle are not exposed (Active)   05/06/22 1147   Altered Skin Integrity Present on Admission:    Side: Right   Orientation: posterior   Location: Calf   Wound Number:    Is this injury device related?: No   Primary Wound Type: Abscess   Description of Altered Skin Integrity: Full thickness tissue loss. Subcutaneous fat may be visible but bone, tendon or muscle are not exposed   Removal Indication and Assessment:    Wound Outcome:    (Retired) Wound Length (cm):    (Retired) Wound Width (cm):    (Retired) Depth (cm):    Wound Description (Comments):    Removal Indications:    Wound Image   08/16/22 1100   Dressing Appearance Intact;Moist drainage 08/16/22 1100   Drainage Amount Moderate 08/16/22 1100   Drainage Characteristics/Odor Serosanguineous;No odor 08/16/22 1100   Appearance Pink;Red;Granulating 08/16/22 1100   Tissue loss description Full thickness 08/16/22 1100   Red (%), Wound Tissue Color 100 % 08/16/22 1100   Periwound Area Intact 08/16/22 1100   Wound Edges Defined 08/16/22 1100   Wound Length (cm) 1.2 cm 08/16/22 1100   Wound Width (cm) 2.5 cm 08/16/22 1100   Wound Depth (cm) 0.5 cm 08/16/22 1100   Wound Volume (cm^3) 1.5 cm^3 08/16/22 1100   Wound Surface Area (cm^2) 3 cm^2 08/16/22 1100   Care Cleansed with:;Antimicrobial agent 08/16/22 1100   Dressing Applied;Hydrofiber;Silver;Bandaid 08/16/22 1100            Altered Skin Integrity Left posterior Arm Other (comment) (Active)       Altered Skin Integrity Present on Admission:    Side: Left   Orientation: posterior   Location: Arm   Wound Number:    Is this injury device related?:    Primary Wound Type: Other   Description of Altered Skin Integrity:    Removal Indication and Assessment:    Wound Outcome: Other   (Retired) Wound Length (cm):    (Retired) Wound Width (cm):    (Retired) Depth (cm):    Wound Description (Comments):    Removal Indications:    Dressing Appearance Intact;Dry;Clean  08/16/22 1100   Drainage Amount None 08/16/22 1100   Appearance Closed/resurfaced 08/16/22 1100   Periwound Area Intact;Scar tissue 08/16/22 1100   Wound Length (cm) 0 cm 08/16/22 1100   Wound Width (cm) 0 cm 08/16/22 1100   Wound Depth (cm) 0 cm 08/16/22 1100   Wound Volume (cm^3) 0 cm^3 08/16/22 1100   Wound Surface Area (cm^2) 0 cm^2 08/16/22 1100   Dressing Applied;Bandaid 08/16/22 1100         Assessment:     today wound of right heel is 2 x 3 cm.  This is more granulated.  Edges are smaller.  Area was cleaned and Aquacell ag was applied with absorbed dressing and gauze.  Right calf abscess area is granulating. .  This was packed with Aquacell and a Band-Aid was applied.  Patient will change dressings every day and will return in 2 weeks for followup    ICD-10-CM ICD-9-CM   1. Abscess of calf  L02.419 682.6   2. Pressure injury of right heel, stage 3  L89.613 707.07     707.23         Plan:   Tissue pathology and/or culture taken:  [] Yes [x] No   Sharp debridement performed:   [] Yes [x] No   Labs ordered this visit:   [] Yes [x] No   Imaging ordered this visit:   [] Yes [x] No           No orders of the defined types were placed in this encounter.       No follow-ups on file.

## 2022-08-22 DIAGNOSIS — J39.8 DISEASE OF TRACHEA: Primary | ICD-10-CM

## 2022-08-25 ENCOUNTER — HOSPITAL ENCOUNTER (OUTPATIENT)
Dept: RADIOLOGY | Facility: HOSPITAL | Age: 57
Discharge: HOME | End: 2022-08-25
Attending: OTOLARYNGOLOGY
Payer: MEDICARE

## 2022-08-25 DIAGNOSIS — J39.8 DISEASE OF TRACHEA: ICD-10-CM

## 2022-08-25 LAB
CREAT SERPL-MCNC: 0.8 MG/DL (ref 0.5–1.4)
SAMPLE: NORMAL

## 2022-08-25 PROCEDURE — 25500020 PHARM REV CODE 255: Performed by: OTOLARYNGOLOGY

## 2022-08-25 PROCEDURE — 70492 CT SFT TSUE NCK W/O & W/DYE: CPT | Mod: TC

## 2022-08-25 RX ADMIN — IOPAMIDOL 100 ML: 755 INJECTION, SOLUTION INTRAVENOUS at 10:08

## 2022-08-30 ENCOUNTER — HOSPITAL ENCOUNTER (OUTPATIENT)
Dept: WOUND CARE | Facility: HOSPITAL | Age: 57
Discharge: HOME | End: 2022-08-30
Attending: PEDIATRICS
Payer: MEDICARE

## 2022-08-30 VITALS
DIASTOLIC BLOOD PRESSURE: 67 MMHG | SYSTOLIC BLOOD PRESSURE: 99 MMHG | TEMPERATURE: 98 F | HEART RATE: 73 BPM | RESPIRATION RATE: 20 BRPM

## 2022-08-30 DIAGNOSIS — L02.419 ABSCESS OF CALF: Primary | ICD-10-CM

## 2022-08-30 DIAGNOSIS — L89.613 PRESSURE INJURY OF RIGHT HEEL, STAGE 3: ICD-10-CM

## 2022-08-30 PROCEDURE — 99213 OFFICE O/P EST LOW 20 MIN: CPT | Mod: ,,, | Performed by: PEDIATRICS

## 2022-08-30 PROCEDURE — 99213 PR OFFICE/OUTPT VISIT, EST, LEVL III, 20-29 MIN: ICD-10-PCS | Mod: ,,, | Performed by: PEDIATRICS

## 2022-08-30 PROCEDURE — 99213 OFFICE O/P EST LOW 20 MIN: CPT

## 2022-08-30 NOTE — PROGRESS NOTES
Subjective:       Patient ID: Antonio Galvan II is a 56 y.o. male.    Chief Complaint: Pressure Ulcer (Here for physician reassessment and wound care)  R heel stg 3 pressure ulcer, R posterior calf abscess    HPI  Review of Systems      Objective:      Temp:  [98.4 °F (36.9 °C)]   Pulse:  [73]   Resp:  [20]   BP: (99)/(67)   Physical ExamOpticell      Assessment:     Today right calf lesion is granulating well.  This is smaller.  1 cm x 2 cm.  Depth is 0.3 cm.  The right heel ulceration is also smaller 4.5 cm x 2 cm.  This is also granulating well.  Areas were cleaned with Vashe and Opticel Ag was used to cover the wounds and areas were covered with gauze ABD pads and Kerlix Tubigrip E was used also.  Patient will change dressings every other day and will return for MD visit in 2 weeks.    ICD-10-CM ICD-9-CM   1. Abscess of calf  L02.419 682.6   2. Pressure injury of right heel, stage 3  L89.613 707.07     707.23         Plan:   Tissue pathology and/or culture taken:  [] Yes [x] No   Sharp debridement performed:   [] Yes [x] No   Labs ordered this visit:   [] Yes [x] No   Imaging ordered this visit:   [] Yes [x] No           Orders Placed This Encounter   Procedures    Change dressing     Standing Status:   Standing     Number of Occurrences:   4     Standing Expiration Date:   9/30/2022    Change dressing     Cleanse wound with: Vashe   Periwound care: skin prep periwound , allow to dry well   Primary dressing: apply Opticel Ag over open area of heel and posterior lower leg,   Secondary dressing: cover with gauze, abd prn, kerlix to heel, gauze and cover dressing to leg, bandaid to elbow   Offloading: elevate for edema control, keep pressure off posterior calf/heel   Edema control: Tubigrip E   Frequency: Change every other day  Follow-up: 2 weeks     Standing Status:   Standing     Number of Occurrences:   4     Standing Expiration Date:   9/30/2022        Follow up in about 2 weeks (around 9/13/2022) for md  visit .

## 2022-09-06 ENCOUNTER — HOSPITAL ENCOUNTER (INPATIENT)
Facility: HOSPITAL | Age: 57
LOS: 1 days | Discharge: HOME | DRG: 299 | End: 2022-09-07
Attending: STUDENT IN AN ORGANIZED HEALTH CARE EDUCATION/TRAINING PROGRAM | Admitting: INTERNAL MEDICINE
Payer: MEDICARE

## 2022-09-06 DIAGNOSIS — G82.50 QUADRIPLEGIA: ICD-10-CM

## 2022-09-06 DIAGNOSIS — I82.621 ACUTE DEEP VEIN THROMBOSIS (DVT) OF RIGHT UPPER EXTREMITY, UNSPECIFIED VEIN: Primary | ICD-10-CM

## 2022-09-06 DIAGNOSIS — R60.9 SWELLING: ICD-10-CM

## 2022-09-06 LAB
ALBUMIN SERPL-MCNC: 3.1 GM/DL (ref 3.5–5)
ALBUMIN/GLOB SERPL: 0.8 RATIO (ref 1.1–2)
ALP SERPL-CCNC: 129 UNIT/L (ref 40–150)
ALT SERPL-CCNC: 58 UNIT/L (ref 0–55)
APTT PPP: 34.3 SECONDS (ref 23.2–33.7)
AST SERPL-CCNC: 36 UNIT/L (ref 5–34)
BASOPHILS # BLD AUTO: 0.03 X10(3)/MCL (ref 0–0.2)
BASOPHILS NFR BLD AUTO: 0.5 %
BILIRUBIN DIRECT+TOT PNL SERPL-MCNC: 0.6 MG/DL
BNP BLD-MCNC: 82.6 PG/ML
BUN SERPL-MCNC: 19.7 MG/DL (ref 8.4–25.7)
CALCIUM SERPL-MCNC: 9 MG/DL (ref 8.4–10.2)
CHLORIDE SERPL-SCNC: 108 MMOL/L (ref 98–107)
CO2 SERPL-SCNC: 26 MMOL/L (ref 22–29)
CREAT SERPL-MCNC: 0.86 MG/DL (ref 0.73–1.18)
EOSINOPHIL # BLD AUTO: 0.14 X10(3)/MCL (ref 0–0.9)
EOSINOPHIL NFR BLD AUTO: 2.4 %
ERYTHROCYTE [DISTWIDTH] IN BLOOD BY AUTOMATED COUNT: 17.6 % (ref 11.5–17)
GFR SERPLBLD CREATININE-BSD FMLA CKD-EPI: >60 MLS/MIN/1.73/M2
GLOBULIN SER-MCNC: 3.7 GM/DL (ref 2.4–3.5)
GLUCOSE SERPL-MCNC: 168 MG/DL (ref 74–100)
HCT VFR BLD AUTO: 36.4 % (ref 42–52)
HGB BLD-MCNC: 10.5 GM/DL (ref 14–18)
IMM GRANULOCYTES # BLD AUTO: 0.02 X10(3)/MCL (ref 0–0.04)
IMM GRANULOCYTES NFR BLD AUTO: 0.3 %
INR BLD: 1.29 (ref 0–1.3)
LYMPHOCYTES # BLD AUTO: 1.62 X10(3)/MCL (ref 0.6–4.6)
LYMPHOCYTES NFR BLD AUTO: 28.3 %
MCH RBC QN AUTO: 24.6 PG (ref 27–31)
MCHC RBC AUTO-ENTMCNC: 28.8 MG/DL (ref 33–36)
MCV RBC AUTO: 85.2 FL (ref 80–94)
MONOCYTES # BLD AUTO: 0.36 X10(3)/MCL (ref 0.1–1.3)
MONOCYTES NFR BLD AUTO: 6.3 %
NEUTROPHILS # BLD AUTO: 3.6 X10(3)/MCL (ref 2.1–9.2)
NEUTROPHILS NFR BLD AUTO: 62.2 %
NRBC BLD AUTO-RTO: 0 %
PLATELET # BLD AUTO: 212 X10(3)/MCL (ref 130–400)
PMV BLD AUTO: 11.3 FL (ref 7.4–10.4)
POTASSIUM SERPL-SCNC: 4.3 MMOL/L (ref 3.5–5.1)
PROT SERPL-MCNC: 6.8 GM/DL (ref 6.4–8.3)
PROTHROMBIN TIME: 15.9 SECONDS (ref 12.5–14.5)
RBC # BLD AUTO: 4.27 X10(6)/MCL (ref 4.7–6.1)
SARS-COV-2 RDRP RESP QL NAA+PROBE: NEGATIVE
SODIUM SERPL-SCNC: 140 MMOL/L (ref 136–145)
TROPONIN I SERPL-MCNC: 0.03 NG/ML (ref 0–0.04)
WBC # SPEC AUTO: 5.7 X10(3)/MCL (ref 4.5–11.5)

## 2022-09-06 PROCEDURE — 85025 COMPLETE CBC W/AUTO DIFF WBC: CPT | Performed by: PHYSICIAN ASSISTANT

## 2022-09-06 PROCEDURE — 99285 EMERGENCY DEPT VISIT HI MDM: CPT | Mod: 25

## 2022-09-06 PROCEDURE — 83880 ASSAY OF NATRIURETIC PEPTIDE: CPT | Performed by: PHYSICIAN ASSISTANT

## 2022-09-06 PROCEDURE — 63600175 PHARM REV CODE 636 W HCPCS: Performed by: STUDENT IN AN ORGANIZED HEALTH CARE EDUCATION/TRAINING PROGRAM

## 2022-09-06 PROCEDURE — 85610 PROTHROMBIN TIME: CPT | Performed by: PHYSICIAN ASSISTANT

## 2022-09-06 PROCEDURE — 84484 ASSAY OF TROPONIN QUANT: CPT | Performed by: PHYSICIAN ASSISTANT

## 2022-09-06 PROCEDURE — 87635 SARS-COV-2 COVID-19 AMP PRB: CPT | Performed by: STUDENT IN AN ORGANIZED HEALTH CARE EDUCATION/TRAINING PROGRAM

## 2022-09-06 PROCEDURE — 96372 THER/PROPH/DIAG INJ SC/IM: CPT | Performed by: STUDENT IN AN ORGANIZED HEALTH CARE EDUCATION/TRAINING PROGRAM

## 2022-09-06 PROCEDURE — 25000003 PHARM REV CODE 250: Performed by: STUDENT IN AN ORGANIZED HEALTH CARE EDUCATION/TRAINING PROGRAM

## 2022-09-06 PROCEDURE — 11000001 HC ACUTE MED/SURG PRIVATE ROOM

## 2022-09-06 PROCEDURE — 36415 COLL VENOUS BLD VENIPUNCTURE: CPT | Performed by: PHYSICIAN ASSISTANT

## 2022-09-06 PROCEDURE — 80053 COMPREHEN METABOLIC PANEL: CPT | Performed by: PHYSICIAN ASSISTANT

## 2022-09-06 PROCEDURE — 85730 THROMBOPLASTIN TIME PARTIAL: CPT | Performed by: PHYSICIAN ASSISTANT

## 2022-09-06 RX ORDER — CLONIDINE HYDROCHLORIDE 0.2 MG/1
0.2 TABLET ORAL
Status: COMPLETED | OUTPATIENT
Start: 2022-09-06 | End: 2022-09-06

## 2022-09-06 RX ORDER — ENOXAPARIN SODIUM 150 MG/ML
1 INJECTION SUBCUTANEOUS
Status: COMPLETED | OUTPATIENT
Start: 2022-09-06 | End: 2022-09-06

## 2022-09-06 RX ADMIN — CLONIDINE HYDROCHLORIDE 0.2 MG: 0.2 TABLET ORAL at 10:09

## 2022-09-06 RX ADMIN — ENOXAPARIN SODIUM 135 MG: 150 INJECTION SUBCUTANEOUS at 08:09

## 2022-09-06 NOTE — ED NOTES
"ASSUMED CARE PT AAOX4 NAD C/O SWELLING IN R SIDE OF NECK HANDS AND FACE X1 WEEK WAS SEEN IN PCP OFFICE TODAY AND SENT FOR US TO R/O   BLOOD CLOT". PT QUADRIPLEGIC IN MOTORIZED WC REQUEST TO STAY IN CHAIR AND  IF  HE NEED TO TRANSFER HE HAS HIS 2 NURSES IN LOBBY AND HE WANTS THEM TO COME TRANSFER HIM. SPOUSE AT BEDSIDE.  "

## 2022-09-06 NOTE — FIRST PROVIDER EVALUATION
Medical screening exam completed.  I have conducted a focused provider triage encounter, findings are as follows:    Brief history of present illness:  57 yo male with hx of quadriplegia presents to ED for evaluation of neck swelling for the past several days. Complains of hand and arm swelling. Denies any SOB or CP. Seen by PCP and sent for further evaluation.   Vitals:    09/06/22 1742   BP: (!) 142/84   Pulse: 85   Resp: 18   Temp: 98.2 °F (36.8 °C)   TempSrc: Oral   SpO2: 96%       Pertinent physical exam:  Sitting in wheelchair, awake, and alert     Brief workup plan:  Labs, CXR    Preliminary workup initiated; this workup will be continued and followed by the physician or advanced practice provider that is assigned to the patient when roomed.

## 2022-09-06 NOTE — ED PROVIDER NOTES
Encounter Date: 9/6/2022    SCRIBE #1 NOTE: I, Latisha Encinas, am scribing for, and in the presence of,  Boubacar Steele IV, MD. I have scribed the following portions of the note - Other sections scribed: HPI, ROS, PE.     History     Chief Complaint   Patient presents with    Neck Swelling     Pt to ER via POV for neck swelling.  Also hand and arm swelling.  Started several days ago.   Recent mucous plug arrest in July. Sent by PCP for eval     55 y/o male with hx of DM and Quadriplegia presents to ED for neck swelling that onset a week and a half ago. Pt's spouse states they were at his PCP appointment and they were sent to the ED due to concerns of his neck swelling. She also stated that he has had swelling to his face, arms, and hands that started about a wk ago. Pt reports SOB and slight soreness to his neck. Pt notes having Endoscopic sinus surgery about a month ago. Pt is on Eliquis.     The history is provided by the spouse and the patient. No  was used.   Neck Swelling  This is a new problem. The current episode started more than 1 week ago. Associated symptoms include shortness of breath. Pertinent negatives include no chest pain and no abdominal pain. Nothing aggravates the symptoms. Nothing relieves the symptoms.   Review of patient's allergies indicates:  No Known Allergies  Past Medical History:   Diagnosis Date    Chronic skin ulcer     DM (diabetes mellitus)     Infected decubitus ulcer     Lymphedema of leg     Neurogenic bladder     Obesity, unspecified     Open wound of abdomen     Pressure ulcer of heel     Pressure ulcer of right foot, stage 3     Pressure ulcer of right ischium     Quadriplegia     Quadriplegic spinal paralysis      No past surgical history on file.  History reviewed. No pertinent family history.  Social History     Tobacco Use    Smoking status: Never     Review of Systems   Constitutional:  Negative for chills and fever.   HENT:  Negative for congestion,  rhinorrhea and sore throat.    Eyes:  Negative for visual disturbance.   Respiratory:  Positive for shortness of breath. Negative for cough.    Cardiovascular:  Negative for chest pain.   Gastrointestinal:  Negative for abdominal pain, nausea and vomiting.   Genitourinary:  Negative for dysuria and hematuria.   Musculoskeletal:  Negative for joint swelling.        Neck swelling   Skin:  Negative for rash.   Neurological:  Negative for weakness.   Psychiatric/Behavioral:  Negative for confusion.    All other systems reviewed and are negative.    Physical Exam     Initial Vitals [09/06/22 1742]   BP Pulse Resp Temp SpO2   (!) 142/84 85 18 98.2 °F (36.8 °C) 96 %      MAP       --         Physical Exam    Nursing note and vitals reviewed.  Constitutional: He is not diaphoretic. No distress.   HENT:   Head: Normocephalic and atraumatic.   Eyes: EOM are normal. Pupils are equal, round, and reactive to light.   Neck: Neck supple.   Normal range of motion.  Cardiovascular:  Normal rate and regular rhythm.           No murmur heard.  Pulmonary/Chest: Breath sounds normal. No respiratory distress. He has no wheezes. He has no rales.   Abdominal: Abdomen is soft. He exhibits no distension. There is no abdominal tenderness.   Musculoskeletal:         General: Edema (Trace upper extremity edema. L side worse) present. Normal range of motion.      Cervical back: Normal range of motion and neck supple.      Comments: R side supraclavicular swelling, non tender to palpation.      Neurological: He is alert and oriented to person, place, and time. He has normal strength. No cranial nerve deficit.   Skin: Skin is warm. No rash noted.   Psychiatric: He has a normal mood and affect.       ED Course   Procedures  Labs Reviewed   COMPREHENSIVE METABOLIC PANEL - Abnormal; Notable for the following components:       Result Value    Chloride 108 (*)     Glucose Level 168 (*)     Albumin Level 3.1 (*)     Globulin 3.7 (*)     Albumin/Globulin  Ratio 0.8 (*)     Alanine Aminotransferase 58 (*)     Aspartate Aminotransferase 36 (*)     All other components within normal limits   APTT - Abnormal; Notable for the following components:    PTT 34.3 (*)     All other components within normal limits   PROTIME-INR - Abnormal; Notable for the following components:    PT 15.9 (*)     All other components within normal limits   CBC WITH DIFFERENTIAL - Abnormal; Notable for the following components:    RBC 4.27 (*)     Hgb 10.5 (*)     Hct 36.4 (*)     MCH 24.6 (*)     MCHC 28.8 (*)     RDW 17.6 (*)     MPV 11.3 (*)     All other components within normal limits   B-TYPE NATRIURETIC PEPTIDE - Normal   TROPONIN I - Normal   SARS-COV-2 RNA AMPLIFICATION, QUAL - Normal   CBC W/ AUTO DIFFERENTIAL    Narrative:     The following orders were created for panel order CBC auto differential.  Procedure                               Abnormality         Status                     ---------                               -----------         ------                     CBC with Differential[[id number]]        Abnormal            Final result                 Please view results for these tests on the individual orders.          Imaging Results              X-Ray Chest 1 View (In process)                      US Soft Tissue Head Neck Thyroid (In process)    Procedure changed from US Extremity Non Vascular Complete Right                    Medications   enoxaparin injection 135 mg (135 mg Subcutaneous Given 9/6/22 2038)   cloNIDine tablet 0.2 mg (0.2 mg Oral Given 9/6/22 2220)     Medical Decision Making:   History:   Old Records Summarized: records from clinic visits.  Initial Assessment:   55 yo with quadraplegia, afib on elaquis - presenting with R neck swelling/supraclavicular swelling for several days. US with acute RUE DVT. Discussed with [facility] - plan for lovenox, admission. [facility] will evaluate him in AM .  Clinical Tests:   Lab Tests: Ordered and Reviewed  Radiological Study: Ordered  and Reviewed  ED Management:  Lovenox         Scribe Attestation:   Scribe #1: I performed the above scribed service and the documentation accurately describes the services I performed. I attest to the accuracy of the note.    Attending Attestation:           Physician Attestation for Scribe:  Physician Attestation Statement for Scribe #1: I, Boubacar Steele IV, MD, reviewed documentation, as scribed by Latisha Encinas in my presence, and it is both accurate and complete.           ED Course as of 09/06/22 2314   Tue Sep 06, 2022   1957 Ricki with [facility] paged - will call [facility] for recommendations.  [AC]   2000 Ricki with [facility] - recommends lovenox, admission. Will evaluate for clot retrieval tomorrow.  [AC]   2142 Admitted to hospitalist [CY]      ED Course User Index  [AC] Boubacar Steele IV, MD  [CY] Urban Poe             Clinical Impression:   Final diagnoses:  [R60.9] Swelling  [I82.621] Acute deep vein thrombosis (DVT) of right upper extremity, unspecified vein (Primary)  [G82.50] Quadriplegia        ED Disposition Condition    Admit         IBoubacar MD personally performed the history, PE, MDM, and procedures as documented above and agree with the scribe's documentation.           Boubacar Steele IV, MD  09/06/22 1491

## 2022-09-07 VITALS
WEIGHT: 286.63 LBS | TEMPERATURE: 99 F | DIASTOLIC BLOOD PRESSURE: 87 MMHG | BODY MASS INDEX: 43.58 KG/M2 | OXYGEN SATURATION: 98 % | HEART RATE: 92 BPM | SYSTOLIC BLOOD PRESSURE: 155 MMHG | RESPIRATION RATE: 21 BRPM

## 2022-09-07 PROBLEM — I82.409 ACUTE DVT (DEEP VENOUS THROMBOSIS): Status: ACTIVE | Noted: 2022-09-07

## 2022-09-07 LAB
ALBUMIN SERPL-MCNC: 2.9 GM/DL (ref 3.5–5)
ALBUMIN/GLOB SERPL: 0.8 RATIO (ref 1.1–2)
ALP SERPL-CCNC: 115 UNIT/L (ref 40–150)
ALT SERPL-CCNC: 46 UNIT/L (ref 0–55)
AST SERPL-CCNC: 19 UNIT/L (ref 5–34)
BASOPHILS # BLD AUTO: 0.04 X10(3)/MCL (ref 0–0.2)
BASOPHILS NFR BLD AUTO: 0.5 %
BILIRUBIN DIRECT+TOT PNL SERPL-MCNC: 0.6 MG/DL
BUN SERPL-MCNC: 17.5 MG/DL (ref 8.4–25.7)
CALCIUM SERPL-MCNC: 9.1 MG/DL (ref 8.4–10.2)
CHLORIDE SERPL-SCNC: 109 MMOL/L (ref 98–107)
CO2 SERPL-SCNC: 26 MMOL/L (ref 22–29)
CREAT SERPL-MCNC: 0.78 MG/DL (ref 0.73–1.18)
EOSINOPHIL # BLD AUTO: 0.19 X10(3)/MCL (ref 0–0.9)
EOSINOPHIL NFR BLD AUTO: 2.5 %
ERYTHROCYTE [DISTWIDTH] IN BLOOD BY AUTOMATED COUNT: 17.6 % (ref 11.5–17)
GFR SERPLBLD CREATININE-BSD FMLA CKD-EPI: >60 MLS/MIN/1.73/M2
GLOBULIN SER-MCNC: 3.5 GM/DL (ref 2.4–3.5)
GLUCOSE SERPL-MCNC: 103 MG/DL (ref 74–100)
HCT VFR BLD AUTO: 36 % (ref 42–52)
HGB BLD-MCNC: 10.1 GM/DL (ref 14–18)
IMM GRANULOCYTES # BLD AUTO: 0.03 X10(3)/MCL (ref 0–0.04)
IMM GRANULOCYTES NFR BLD AUTO: 0.4 %
LYMPHOCYTES # BLD AUTO: 2.34 X10(3)/MCL (ref 0.6–4.6)
LYMPHOCYTES NFR BLD AUTO: 30.5 %
MCH RBC QN AUTO: 24.1 PG (ref 27–31)
MCHC RBC AUTO-ENTMCNC: 28.1 MG/DL (ref 33–36)
MCV RBC AUTO: 85.9 FL (ref 80–94)
MONOCYTES # BLD AUTO: 0.6 X10(3)/MCL (ref 0.1–1.3)
MONOCYTES NFR BLD AUTO: 7.8 %
NEUTROPHILS # BLD AUTO: 4.5 X10(3)/MCL (ref 2.1–9.2)
NEUTROPHILS NFR BLD AUTO: 58.3 %
NRBC BLD AUTO-RTO: 0 %
PLATELET # BLD AUTO: 219 X10(3)/MCL (ref 130–400)
PMV BLD AUTO: 11.3 FL (ref 7.4–10.4)
POCT GLUCOSE: 103 MG/DL (ref 70–110)
POCT GLUCOSE: 113 MG/DL (ref 70–110)
POTASSIUM SERPL-SCNC: 4.3 MMOL/L (ref 3.5–5.1)
PROT SERPL-MCNC: 6.4 GM/DL (ref 6.4–8.3)
RBC # BLD AUTO: 4.19 X10(6)/MCL (ref 4.7–6.1)
SODIUM SERPL-SCNC: 140 MMOL/L (ref 136–145)
WBC # SPEC AUTO: 7.7 X10(3)/MCL (ref 4.5–11.5)

## 2022-09-07 PROCEDURE — 80053 COMPREHEN METABOLIC PANEL: CPT | Performed by: INTERNAL MEDICINE

## 2022-09-07 PROCEDURE — 36415 COLL VENOUS BLD VENIPUNCTURE: CPT | Performed by: INTERNAL MEDICINE

## 2022-09-07 PROCEDURE — 85025 COMPLETE CBC W/AUTO DIFF WBC: CPT | Performed by: INTERNAL MEDICINE

## 2022-09-07 PROCEDURE — 25000003 PHARM REV CODE 250: Performed by: INTERNAL MEDICINE

## 2022-09-07 RX ORDER — GLYCOPYRROLATE 1 MG/1
1 TABLET ORAL 3 TIMES DAILY
Status: DISCONTINUED | OUTPATIENT
Start: 2022-09-07 | End: 2022-09-07 | Stop reason: HOSPADM

## 2022-09-07 RX ORDER — INSULIN ASPART 100 [IU]/ML
15 INJECTION, SOLUTION INTRAVENOUS; SUBCUTANEOUS
Status: DISCONTINUED | OUTPATIENT
Start: 2022-09-07 | End: 2022-09-07 | Stop reason: HOSPADM

## 2022-09-07 RX ORDER — IBUPROFEN 200 MG
16 TABLET ORAL
Status: DISCONTINUED | OUTPATIENT
Start: 2022-09-07 | End: 2022-09-07 | Stop reason: HOSPADM

## 2022-09-07 RX ORDER — INSULIN ASPART 100 [IU]/ML
1-10 INJECTION, SOLUTION INTRAVENOUS; SUBCUTANEOUS
Status: DISCONTINUED | OUTPATIENT
Start: 2022-09-07 | End: 2022-09-07 | Stop reason: HOSPADM

## 2022-09-07 RX ORDER — OXYBUTYNIN CHLORIDE 5 MG/1
5 TABLET ORAL 3 TIMES DAILY
Status: DISCONTINUED | OUTPATIENT
Start: 2022-09-07 | End: 2022-09-07 | Stop reason: HOSPADM

## 2022-09-07 RX ORDER — IMIPRAMINE HYDROCHLORIDE 25 MG/1
25 TABLET, FILM COATED ORAL NIGHTLY
Status: DISCONTINUED | OUTPATIENT
Start: 2022-09-07 | End: 2022-09-07 | Stop reason: HOSPADM

## 2022-09-07 RX ORDER — TALC
6 POWDER (GRAM) TOPICAL NIGHTLY PRN
Status: DISCONTINUED | OUTPATIENT
Start: 2022-09-07 | End: 2022-09-07 | Stop reason: HOSPADM

## 2022-09-07 RX ORDER — IBUPROFEN 200 MG
24 TABLET ORAL
Status: DISCONTINUED | OUTPATIENT
Start: 2022-09-07 | End: 2022-09-07 | Stop reason: HOSPADM

## 2022-09-07 RX ORDER — GLUCAGON 1 MG
1 KIT INJECTION
Status: DISCONTINUED | OUTPATIENT
Start: 2022-09-07 | End: 2022-09-07 | Stop reason: HOSPADM

## 2022-09-07 RX ORDER — GENTAMICIN SULFATE 1 MG/G
OINTMENT TOPICAL 2 TIMES DAILY
Status: DISCONTINUED | OUTPATIENT
Start: 2022-09-07 | End: 2022-09-07 | Stop reason: HOSPADM

## 2022-09-07 RX ORDER — BUDESONIDE 0.5 MG/2ML
1 INHALANT ORAL DAILY
Status: ON HOLD | COMMUNITY
Start: 2021-12-08 | End: 2023-09-27

## 2022-09-07 RX ORDER — LIDOCAINE 40 MG/G
CREAM TOPICAL
Status: DISCONTINUED | OUTPATIENT
Start: 2022-09-07 | End: 2022-09-07 | Stop reason: HOSPADM

## 2022-09-07 RX ORDER — MEN/EUC/THY/CAMP/BENZ/NACL/POT
50 SOLUTION, NON-ORAL NASAL DAILY
Status: ON HOLD | COMMUNITY
Start: 2022-08-15

## 2022-09-07 RX ORDER — FLUTICASONE PROPIONATE 50 MCG
2 SPRAY, SUSPENSION (ML) NASAL DAILY
Status: DISCONTINUED | OUTPATIENT
Start: 2022-09-07 | End: 2022-09-07 | Stop reason: HOSPADM

## 2022-09-07 RX ORDER — METOPROLOL SUCCINATE 50 MG/1
50 TABLET, EXTENDED RELEASE ORAL 2 TIMES DAILY
Status: DISCONTINUED | OUTPATIENT
Start: 2022-09-07 | End: 2022-09-07 | Stop reason: HOSPADM

## 2022-09-07 RX ORDER — SODIUM CHLORIDE 0.9 % (FLUSH) 0.9 %
10 SYRINGE (ML) INJECTION
Status: DISCONTINUED | OUTPATIENT
Start: 2022-09-07 | End: 2022-09-07 | Stop reason: HOSPADM

## 2022-09-07 RX ADMIN — OXYBUTYNIN CHLORIDE 5 MG: 5 TABLET ORAL at 04:09

## 2022-09-07 RX ADMIN — OXYBUTYNIN CHLORIDE 5 MG: 5 TABLET ORAL at 09:09

## 2022-09-07 RX ADMIN — METOPROLOL SUCCINATE 50 MG: 50 TABLET, EXTENDED RELEASE ORAL at 09:09

## 2022-09-07 RX ADMIN — SITAGLIPTIN 100 MG: 100 TABLET, FILM COATED ORAL at 09:09
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
## 2022-09-07 NOTE — ED NOTES
Positive deep vein thrombosis identified in the mid brachial vein of the right upper extremity. Superficial vein thrombosis identified in the basilic vein of the right upper extremity.
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
## 2022-09-08 ENCOUNTER — PATIENT OUTREACH (OUTPATIENT)
Dept: ADMINISTRATIVE | Facility: CLINIC | Age: 57
End: 2022-09-08
Payer: MEDICARE

## 2022-09-08 NOTE — PROGRESS NOTES
C3 nurse spoke with Antonio Galvan II's mother, Judy who is patient's caregiver for a TCC post hospital discharge follow up call. The patient has a scheduled HOSFU appointment with Rinku Clayton MD, Cardiologist on 9/12/22 @ 2:15.

## 2022-09-08 NOTE — PROGRESS NOTES
C3 nurse attempted to contact Antonio Galvan II for a TCC post hospital discharge follow up call. No answer. Left voicemail with callback information. The patient has a scheduled HOSFU appointment with Rinku Clayton MD, Cardiologist on 9/12/22 @ 2:15.

## 2022-09-13 ENCOUNTER — HOSPITAL ENCOUNTER (OUTPATIENT)
Dept: WOUND CARE | Facility: HOSPITAL | Age: 57
Discharge: HOME | End: 2022-09-13
Attending: PEDIATRICS
Payer: MEDICARE

## 2022-09-13 VITALS
RESPIRATION RATE: 18 BRPM | SYSTOLIC BLOOD PRESSURE: 107 MMHG | DIASTOLIC BLOOD PRESSURE: 62 MMHG | HEART RATE: 61 BPM | TEMPERATURE: 99 F | OXYGEN SATURATION: 96 %

## 2022-09-13 DIAGNOSIS — L02.419 ABSCESS OF CALF: ICD-10-CM

## 2022-09-13 DIAGNOSIS — L89.613 PRESSURE INJURY OF RIGHT HEEL, STAGE 3: Primary | ICD-10-CM

## 2022-09-13 PROCEDURE — 99213 PR OFFICE/OUTPT VISIT, EST, LEVL III, 20-29 MIN: ICD-10-PCS | Mod: ,,, | Performed by: PEDIATRICS

## 2022-09-13 PROCEDURE — 99213 OFFICE O/P EST LOW 20 MIN: CPT | Mod: ,,, | Performed by: PEDIATRICS

## 2022-09-13 PROCEDURE — 99213 OFFICE O/P EST LOW 20 MIN: CPT

## 2022-09-13 NOTE — PATIENT INSTRUCTIONS
Cleanse wound with: Vashe   Periwound care: skin prep periwound , allow to dry well   Primary dressing: apply Opticel Ag over open area of heel and posterior lower leg,    Secondary dressing: cover with gauze, abd prn, kerlix to heel, gauze and cover dressing to leg, bandaid to elbow   Offloading: elevate for edema control, keep pressure off posterior calf/heel   Edema control: Tubigrip E   Frequency: Change every other day   Follow-up: 2 weeks

## 2022-09-13 NOTE — PROGRESS NOTES
Subjective:       Patient ID: Antonio Galvan II is a 56 y.o. male.    Chief Complaint: Pressure Ulcer (R heel stg 3 pressure ulcer, R posterior lower leg ulcer/)    HPI  Review of Systems      Objective:         Physical Exam       Altered Skin Integrity 05/06/22 1140 Right Heel  Full thickness tissue loss. Subcutaneous fat may be visible but bone, tendon or muscle are not exposed (Active)   05/06/22 1140   Altered Skin Integrity Present on Admission: yes   Side: Right   Orientation:    Location: Heel   Wound Number:    Is this injury device related?: No   Primary Wound Type:    Description of Altered Skin Integrity: Full thickness tissue loss. Subcutaneous fat may be visible but bone, tendon or muscle are not exposed   Ankle-Brachial Index:    Pulses:    Removal Indication and Assessment:    Wound Outcome:    (Retired) Wound Length (cm):    (Retired) Wound Width (cm):    (Retired) Depth (cm):    Wound Description (Comments):    Removal Indications:    Wound Image   09/13/22 1100   Description of Altered Skin Integrity Full thickness tissue loss. Subcutaneous fat may be visible but bone, tendon or muscle are not exposed 09/13/22 1100   Dressing Appearance Intact;Moist drainage 09/13/22 1100   Drainage Amount Moderate 09/13/22 1100   Drainage Characteristics/Odor Serosanguineous;No odor;Bleeding controlled 09/13/22 1100   Appearance Pink;Red;Granulating 09/13/22 1100   Tissue loss description Full thickness 09/13/22 1100   Red (%), Wound Tissue Color 100 % 09/13/22 1100   Periwound Area Edematous 09/13/22 1100   Wound Edges Jagged 09/13/22 1100   Wound Length (cm) 5 cm 09/13/22 1100   Wound Width (cm) 1.7 cm 09/13/22 1100   Wound Depth (cm) 0.1 cm 09/13/22 1100   Wound Volume (cm^3) 0.85 cm^3 09/13/22 1100   Wound Surface Area (cm^2) 8.5 cm^2 09/13/22 1100   Care Cleansed with:;Antimicrobial agent 09/13/22 1100   Dressing Applied;Hydrofiber;Silver;Gauze;Absorptive Pad;Rolled gauze 09/13/22 1100   Periwound Care  Skin barrier film applied 09/13/22 1100   Compression Tubular elasticized bandage;Other (see comments) 09/13/22 1100   Off Loading Other (see comments) 09/13/22 1100            Altered Skin Integrity 05/06/22 1147 Right posterior Calf Abscess Full thickness tissue loss. Subcutaneous fat may be visible but bone, tendon or muscle are not exposed (Active)   05/06/22 1147   Altered Skin Integrity Present on Admission: yes   Side: Right   Orientation: posterior   Location: Calf   Wound Number:    Is this injury device related?: No   Primary Wound Type: Abscess   Description of Altered Skin Integrity: Full thickness tissue loss. Subcutaneous fat may be visible but bone, tendon or muscle are not exposed   Ankle-Brachial Index:    Pulses:    Removal Indication and Assessment:    Wound Outcome:    (Retired) Wound Length (cm):    (Retired) Wound Width (cm):    (Retired) Depth (cm):    Wound Description (Comments):    Removal Indications:    Wound Image   09/13/22 1100   Dressing Appearance Intact;Moist drainage 09/13/22 1100   Drainage Amount Moderate 09/13/22 1100   Drainage Characteristics/Odor Clots;Serosanguineous;No odor;Bleeding controlled 09/13/22 1100   Appearance Red;Granulating 09/13/22 1100   Tissue loss description Full thickness 09/13/22 1100   Red (%), Wound Tissue Color 100 % 09/13/22 1100   Periwound Area Intact;Edematous 09/13/22 1100   Wound Edges Defined 09/13/22 1100   Wound Length (cm) 0.7 cm 09/13/22 1100   Wound Width (cm) 1.9 cm 09/13/22 1100   Wound Depth (cm) 0.1 cm 09/13/22 1100   Wound Volume (cm^3) 0.133 cm^3 09/13/22 1100   Wound Surface Area (cm^2) 1.33 cm^2 09/13/22 1100   Care Cleansed with:;Antimicrobial agent 09/13/22 1100   Dressing Applied;Hydrofiber;Silver;Gauze;Other (comment) 09/13/22 1100   Compression Tubular elasticized bandage;Other (see comments) 09/13/22 1100         Assessment:     Today both wounds there is some bleeding with this was controlled.  Swelling has improved.  Areas  were cleaned with Vashe and Opticel Ag was placed over both areas and gauze ABD pads Kerlix to heal and gauze and cover dressings were placed.  Tubigrip E was placed over the area.  Plan was to change the dressings every other day and follow up in 2 weeks for physician visit.    ICD-10-CM ICD-9-CM   1. Pressure injury of right heel, stage 3  L89.613 707.07     707.23   2. Abscess of calf  L02.419 682.6         Plan:   Tissue pathology and/or culture taken:  [] Yes [x] No   Sharp debridement performed:   [] Yes [x] No   Labs ordered this visit:   [] Yes [x] No   Imaging ordered this visit:   [] Yes [x] No           Orders Placed This Encounter   Procedures    Change dressing     Cleanse wound with: Vashe   Periwound care: skin prep periwound , allow to dry well   Primary dressing: apply Opticel Ag over open area of heel and posterior lower leg,   Secondary dressing: cover with gauze, abd prn, kerlix to heel, gauze and cover dressing to leg, bandaid to elbow   Offloading: elevate for edema control, keep pressure off posterior calf/heel   Edema control: Tubigrip E   Frequency: Change every other day  Follow-up: 2 weeks     Standing Status:   Standing     Number of Occurrences:   1        Follow up in about 2 weeks (around 9/27/2022) for md visit .

## 2022-09-27 ENCOUNTER — HOSPITAL ENCOUNTER (OUTPATIENT)
Dept: WOUND CARE | Facility: HOSPITAL | Age: 57
Discharge: HOME | End: 2022-09-27
Attending: PEDIATRICS
Payer: MEDICARE

## 2022-09-27 VITALS
TEMPERATURE: 98 F | DIASTOLIC BLOOD PRESSURE: 62 MMHG | OXYGEN SATURATION: 98 % | SYSTOLIC BLOOD PRESSURE: 95 MMHG | RESPIRATION RATE: 20 BRPM | HEART RATE: 64 BPM

## 2022-09-27 DIAGNOSIS — L89.613 PRESSURE INJURY OF RIGHT HEEL, STAGE 3: ICD-10-CM

## 2022-09-27 DIAGNOSIS — L02.419 ABSCESS OF CALF: ICD-10-CM

## 2022-09-27 PROCEDURE — 99213 OFFICE O/P EST LOW 20 MIN: CPT | Mod: 27

## 2022-09-27 PROCEDURE — 99213 PR OFFICE/OUTPT VISIT, EST, LEVL III, 20-29 MIN: ICD-10-PCS | Mod: ,,, | Performed by: PEDIATRICS

## 2022-09-27 PROCEDURE — 99213 OFFICE O/P EST LOW 20 MIN: CPT | Mod: ,,, | Performed by: PEDIATRICS

## 2022-09-27 RX ORDER — CARVEDILOL 12.5 MG/1
TABLET ORAL
Status: ON HOLD | COMMUNITY
Start: 2022-09-22 | End: 2023-05-25 | Stop reason: HOSPADM

## 2022-09-27 NOTE — PROGRESS NOTES
Subjective:       Patient ID: Antonio Galvan II is a 56 y.o. male.    Chief Complaint: Pressure Ulcer and Non-healing Wound Follow Up (Stg 3 to R heel , non healing wound to R posterior lower leg)    HPI  Review of Systems      Objective:      Temp:  [97.9 °F (36.6 °C)]   Pulse:  [64]   Resp:  [20]   BP: (95)/(62)   SpO2:  [98 %]   Physical Exam       Altered Skin Integrity 05/06/22 1140 Right Heel  Full thickness tissue loss. Subcutaneous fat may be visible but bone, tendon or muscle are not exposed (Active)   05/06/22 1140   Altered Skin Integrity Present on Admission: yes   Side: Right   Orientation:    Location: Heel   Wound Number:    Is this injury device related?: No   Primary Wound Type:    Description of Altered Skin Integrity: Full thickness tissue loss. Subcutaneous fat may be visible but bone, tendon or muscle are not exposed   Ankle-Brachial Index:    Pulses:    Removal Indication and Assessment:    Wound Outcome:    (Retired) Wound Length (cm):    (Retired) Wound Width (cm):    (Retired) Depth (cm):    Wound Description (Comments):    Removal Indications:    Wound Image   09/27/22 1127   Description of Altered Skin Integrity Full thickness tissue loss. Subcutaneous fat may be visible but bone, tendon or muscle are not exposed 09/27/22 1127   Dressing Appearance Intact;Moist drainage 09/27/22 1127   Drainage Amount Moderate 09/27/22 1127   Drainage Characteristics/Odor Serosanguineous 09/27/22 1127   Appearance Pink;Red;Granulating;Ecchymotic 09/27/22 1127   Tissue loss description Full thickness 09/27/22 1127   Red (%), Wound Tissue Color 100 % 09/27/22 1127   Periwound Area Edematous;Denuded 09/27/22 1127   Wound Edges Defined 09/27/22 1127   Wound Length (cm) 5 cm 09/27/22 1127   Wound Width (cm) 4.1 cm 09/27/22 1127   Wound Depth (cm) 0.1 cm 09/27/22 1127   Wound Volume (cm^3) 2.05 cm^3 09/27/22 1127   Wound Surface Area (cm^2) 20.5 cm^2 09/27/22 1127   Care Cleansed with:;Sterile normal  saline 09/27/22 1127   Dressing Applied;Hydrofiber;Silver;Gauze;Absorptive Pad;Tubular bandage;Rolled gauze 09/27/22 1127   Periwound Care Skin barrier film applied 09/27/22 1127   Compression Tubular elasticized bandage 09/27/22 1127            Altered Skin Integrity 05/06/22 1147 Right posterior Calf Abscess Full thickness tissue loss. Subcutaneous fat may be visible but bone, tendon or muscle are not exposed (Active)   05/06/22 1147   Altered Skin Integrity Present on Admission: yes   Side: Right   Orientation: posterior   Location: Calf   Wound Number:    Is this injury device related?: No   Primary Wound Type: Abscess   Description of Altered Skin Integrity: Full thickness tissue loss. Subcutaneous fat may be visible but bone, tendon or muscle are not exposed   Ankle-Brachial Index:    Pulses:    Removal Indication and Assessment:    Wound Outcome:    (Retired) Wound Length (cm):    (Retired) Wound Width (cm):    (Retired) Depth (cm):    Wound Description (Comments):    Removal Indications:    Wound Image   09/27/22 1127   Dressing Appearance Intact;Moist drainage 09/27/22 1127   Drainage Amount Small 09/27/22 1127   Drainage Characteristics/Odor Serosanguineous 09/27/22 1127   Appearance Red;Granulating 09/27/22 1127   Tissue loss description Full thickness 09/27/22 1127   Red (%), Wound Tissue Color 100 % 09/27/22 1127   Periwound Area Intact;Edematous 09/27/22 1127   Wound Edges Defined 09/27/22 1127   Wound Length (cm) 0.6 cm 09/27/22 1127   Wound Width (cm) 0.8 cm 09/27/22 1127   Wound Depth (cm) 0.1 cm 09/27/22 1127   Wound Volume (cm^3) 0.048 cm^3 09/27/22 1127   Wound Surface Area (cm^2) 0.48 cm^2 09/27/22 1127   Care Cleansed with:;Sterile normal saline 09/27/22 1127   Dressing Applied;Hydrofiber;Silver;Gauze;Tubular bandage 09/27/22 1127   Compression Tubular elasticized bandage 09/27/22 1127         Assessment:     Today right heel is 4 cm x 5.1 cm which is larger.  Subcutaneous fat is visible.  Most  areas are granulating.  Area was cleaned and Opticel Ag was applied and then ABD pad Kerlix gauze and cover dressing.  Tubigrip used on the foot.  Posterior calf is red and granulating is now 0.6 cm x 0.8 cm.  Again Opticel Ag was used to cover the open area.  Patient will have dressings changed every other day.  Patient spent 4 days in the hospital because of his respiratory problems with possible reactive airway disease.  Patient return in 2 weeks for physician followup    ICD-10-CM ICD-9-CM   1. Abscess of calf  L02.419 682.6   2. Pressure injury of right heel, stage 3  L89.613 707.07     707.23         Plan:   Tissue pathology and/or culture taken:  [] Yes [x] No   Sharp debridement performed:   [] Yes [x] No   Labs ordered this visit:   [] Yes [x] No   Imaging ordered this visit:   [] Yes [x] No           Orders Placed This Encounter   Procedures    Change dressing     Cleanse wound with: Vashe   Periwound care: skin prep periwound , allow to dry well   Primary dressing: apply Opticel Ag over open area of heel and posterior lower leg,   Secondary dressing: cover with gauze, abd prn, kerlix to heel, gauze and cover dressing to leg, bandaid to elbow   Offloading: elevate for edema control, keep pressure off posterior calf/heel   Edema control: Tubigrip E   Frequency: Change every other day  Follow-up: 2 weeks     Standing Status:   Standing     Number of Occurrences:   1        Follow up in about 2 weeks (around 10/11/2022) for MD Visit .

## 2022-09-27 NOTE — PATIENT INSTRUCTIONS
Cleanse wound with: Vashe or wound cleanser  Periwound care: skin prep periwound , allow to dry well   Primary dressing: apply Opticel Ag over open area of heel and posterior lower leg,    Secondary dressing: cover with gauze, abd prn, kerlix to heel, gauze and cover dressing to leg, bandaid to elbow   Offloading: elevate for edema control, keep pressure off posterior calf/heel   Edema control: Tubigrip E   Frequency: Change every other day   Follow-up: 2 weeks

## 2022-09-28 ENCOUNTER — TELEPHONE (OUTPATIENT)
Dept: NEUROLOGY | Facility: CLINIC | Age: 57
End: 2022-09-28
Payer: MEDICARE

## 2022-09-30 ENCOUNTER — TELEPHONE (OUTPATIENT)
Dept: NEUROLOGY | Facility: CLINIC | Age: 57
End: 2022-09-30
Payer: MEDICARE

## 2022-09-30 DIAGNOSIS — R06.02 SOB (SHORTNESS OF BREATH): Primary | ICD-10-CM

## 2022-10-11 ENCOUNTER — HOSPITAL ENCOUNTER (OUTPATIENT)
Dept: WOUND CARE | Facility: HOSPITAL | Age: 57
Discharge: HOME | End: 2022-10-11
Attending: PEDIATRICS
Payer: MEDICARE

## 2022-10-11 VITALS
SYSTOLIC BLOOD PRESSURE: 91 MMHG | HEART RATE: 62 BPM | DIASTOLIC BLOOD PRESSURE: 64 MMHG | RESPIRATION RATE: 20 BRPM | TEMPERATURE: 99 F | OXYGEN SATURATION: 99 %

## 2022-10-11 DIAGNOSIS — L89.613 PRESSURE INJURY OF RIGHT HEEL, STAGE 3: Primary | ICD-10-CM

## 2022-10-11 PROCEDURE — 99213 OFFICE O/P EST LOW 20 MIN: CPT

## 2022-10-11 PROCEDURE — 99213 OFFICE O/P EST LOW 20 MIN: CPT | Mod: ,,, | Performed by: PEDIATRICS

## 2022-10-11 PROCEDURE — 99213 PR OFFICE/OUTPT VISIT, EST, LEVL III, 20-29 MIN: ICD-10-PCS | Mod: ,,, | Performed by: PEDIATRICS

## 2022-10-11 NOTE — PROGRESS NOTES
Subjective:       Patient ID: Antonio Galvan II is a 56 y.o. male.    Chief Complaint: Pressure Ulcer (MD re-evaluation, Right heel pressure ulcer, R posterior lower leg non healing wound)    HPI  Review of Systems      Objective:      Temp:  [98.6 °F (37 °C)]   Pulse:  [62]   Resp:  [20]   BP: (91)/(64)   SpO2:  [99 %]   Physical Exam       Altered Skin Integrity 05/06/22 1140 Right Heel  Full thickness tissue loss. Subcutaneous fat may be visible but bone, tendon or muscle are not exposed (Active)   05/06/22 1140   Altered Skin Integrity Present on Admission: yes   Side: Right   Orientation:    Location: Heel   Wound Number:    Is this injury device related?: No   Primary Wound Type:    Description of Altered Skin Integrity: Full thickness tissue loss. Subcutaneous fat may be visible but bone, tendon or muscle are not exposed   Ankle-Brachial Index:    Pulses:    Removal Indication and Assessment:    Wound Outcome:    (Retired) Wound Length (cm):    (Retired) Wound Width (cm):    (Retired) Depth (cm):    Wound Description (Comments):    Removal Indications:    Wound Image   10/11/22 1101   Description of Altered Skin Integrity Full thickness tissue loss. Subcutaneous fat may be visible but bone, tendon or muscle are not exposed 10/11/22 1101   Dressing Appearance Intact;Moist drainage 10/11/22 1101   Drainage Amount Moderate 10/11/22 1101   Drainage Characteristics/Odor Serosanguineous;Bleeding controlled;No odor 10/11/22 1101   Appearance Red;Granulating 10/11/22 1101   Tissue loss description Full thickness 10/11/22 1101   Red (%), Wound Tissue Color 100 % 10/11/22 1101   Periwound Area Intact;Edematous;Scar tissue 10/11/22 1101   Wound Edges Defined 10/11/22 1101   Wound Length (cm) 4.5 cm 10/11/22 1101   Wound Width (cm) 3.5 cm 10/11/22 1101   Wound Depth (cm) 0.1 cm 10/11/22 1101   Wound Volume (cm^3) 1.575 cm^3 10/11/22 1101   Wound Surface Area (cm^2) 15.75 cm^2 10/11/22 1101   Care Cleansed  with:;Antimicrobial agent 10/11/22 1101   Dressing Applied;Silver;Hydrofiber;Gauze;Absorptive Pad;Rolled gauze;Tubular bandage 10/11/22 1101   Periwound Care Skin barrier film applied 10/11/22 1101   Compression Tubular elasticized bandage 10/11/22 1101            Altered Skin Integrity 05/06/22 1147 Right posterior Calf Abscess Full thickness tissue loss. Subcutaneous fat may be visible but bone, tendon or muscle are not exposed (Active)   05/06/22 1147   Altered Skin Integrity Present on Admission: yes   Side: Right   Orientation: posterior   Location: Calf   Wound Number:    Is this injury device related?: No   Primary Wound Type: Abscess   Description of Altered Skin Integrity: Full thickness tissue loss. Subcutaneous fat may be visible but bone, tendon or muscle are not exposed   Ankle-Brachial Index:    Pulses:    Removal Indication and Assessment:    Wound Outcome:    (Retired) Wound Length (cm):    (Retired) Wound Width (cm):    (Retired) Depth (cm):    Wound Description (Comments):    Removal Indications:    Wound Image   10/11/22 1101   Dressing Appearance Intact;Moist drainage 10/11/22 1101   Drainage Amount Small 10/11/22 1101   Drainage Characteristics/Odor Serosanguineous 10/11/22 1101   Appearance Red;Granulating 10/11/22 1101   Red (%), Wound Tissue Color 100 % 10/11/22 1101   Wound Length (cm) 0.4 cm 10/11/22 1101   Wound Width (cm) 0.4 cm 10/11/22 1101   Wound Depth (cm) 0.1 cm 10/11/22 1101   Wound Volume (cm^3) 0.016 cm^3 10/11/22 1101   Wound Surface Area (cm^2) 0.16 cm^2 10/11/22 1101   Care Cleansed with:;Antimicrobial agent 10/11/22 1101   Dressing Applied;Silver;Hydrofiber;Bandaid 10/11/22 1101   Compression Tubular elasticized bandage 10/11/22 1101         Assessment:     Today the right posterior calf lesion is red granulating well.  Much smaller today and is 0.4 cm x 0.4 cm and 0.1 cm.  This was cleaned and Opticel Ag applied to the area.  Right heel today is 4.5 cm x 3.5 cm.  This is  smaller.  There is good granulation tissue.  Was also cleaned and Opticel Ag was placed over the open area and ABD pad Kerlix gauze and cover dressings were applied.  Tubigrip E was used over the foot and calf.  Patient will change dressings every other day and will return in 2 weeks for MD visit    ICD-10-CM ICD-9-CM   1. Pressure injury of right heel, stage 3  L89.613 707.07     707.23   4.5 cm x 3.5 cm.      Plan:   Tissue pathology and/or culture taken:  [] Yes [x] No   Sharp debridement performed:   [] Yes [x] No   Labs ordered this visit:   [] Yes [x] No   Imaging ordered this visit:   [] Yes [x] No           Orders Placed This Encounter   Procedures    Change dressing     Cleanse wound with: Vashe or wound cleanser  Periwound care: skin prep periwound , allow to dry well   Primary dressing: apply Opticel Ag over open area of heel and posterior lower leg,    Secondary dressing: cover with gauze, abd prn, kerlix to heel, gauze and cover dressing to leg, bandaid to elbow   Offloading: elevate for edema control, keep pressure off posterior calf/heel   Edema control: Tubigrip E   Frequency: Change every other day   Follow-up: 2 weeks     Standing Status:   Standing     Number of Occurrences:   6     Standing Expiration Date:   12/11/2022        Follow up in about 2 weeks (around 10/25/2022) for md visit.

## 2022-10-21 DIAGNOSIS — I82.629: Primary | ICD-10-CM

## 2022-10-25 ENCOUNTER — HOSPITAL ENCOUNTER (OUTPATIENT)
Dept: WOUND CARE | Facility: HOSPITAL | Age: 57
Discharge: HOME | End: 2022-10-25
Attending: PEDIATRICS
Payer: MEDICARE

## 2022-10-25 VITALS
OXYGEN SATURATION: 97 % | HEART RATE: 105 BPM | TEMPERATURE: 99 F | RESPIRATION RATE: 18 BRPM | SYSTOLIC BLOOD PRESSURE: 107 MMHG | DIASTOLIC BLOOD PRESSURE: 76 MMHG

## 2022-10-25 DIAGNOSIS — L89.613 PRESSURE INJURY OF RIGHT HEEL, STAGE 3: Primary | ICD-10-CM

## 2022-10-25 PROCEDURE — 99213 OFFICE O/P EST LOW 20 MIN: CPT

## 2022-10-25 PROCEDURE — 99213 OFFICE O/P EST LOW 20 MIN: CPT | Mod: ,,, | Performed by: PEDIATRICS

## 2022-10-25 PROCEDURE — 99213 PR OFFICE/OUTPT VISIT, EST, LEVL III, 20-29 MIN: ICD-10-PCS | Mod: ,,, | Performed by: PEDIATRICS

## 2022-10-25 NOTE — PATIENT INSTRUCTIONS
Cleanse wound with: Vashe or wound cleanser   Periwound care: skin prep periwound , allow to dry well   Primary dressing: apply Opticel Ag to open ulcers  Secondary dressing: cover with gauze, abd prn, kerlix   Offloading: elevate for edema control, keep pressure off posterior calf/heel   Edema control: Tubigrip E   Apply dry bandaid to top of foot every other day until newly healed skin matures    Frequency: Change every other day   Follow-up: 2 weeks

## 2022-10-25 NOTE — PROGRESS NOTES
Subjective:       Patient ID: Antonio Galvan II is a 57 y.o. male.    Chief Complaint: Pressure Ulcer (Follow up with md for stg 3 pressure ulcer to R heel and R posterior lower leg)    HPI  Review of Systems      Objective:      Temp:  [98.8 °F (37.1 °C)]   Pulse:  [105]   Resp:  [18]   BP: (107)/(76)   SpO2:  [97 %]   Physical Exam       Altered Skin Integrity 05/06/22 1140 Right Heel  Full thickness tissue loss. Subcutaneous fat may be visible but bone, tendon or muscle are not exposed (Active)   05/06/22 1140   Altered Skin Integrity Present on Admission: yes   Side: Right   Orientation:    Location: Heel   Wound Number:    Is this injury device related?: No   Primary Wound Type:    Description of Altered Skin Integrity: Full thickness tissue loss. Subcutaneous fat may be visible but bone, tendon or muscle are not exposed   Ankle-Brachial Index:    Pulses:    Removal Indication and Assessment:    Wound Outcome:    (Retired) Wound Length (cm):    (Retired) Wound Width (cm):    (Retired) Depth (cm):    Wound Description (Comments):    Removal Indications:    Wound Image   10/25/22 1117   Description of Altered Skin Integrity Full thickness tissue loss. Subcutaneous fat may be visible but bone, tendon or muscle are not exposed 10/25/22 1117   Dressing Appearance Intact;Moist drainage 10/25/22 1117   Drainage Amount Moderate 10/25/22 1117   Drainage Characteristics/Odor Serosanguineous 10/25/22 1117   Appearance Red;Granulating 10/25/22 1117   Tissue loss description Full thickness 10/25/22 1117   Red (%), Wound Tissue Color 100 % 10/25/22 1117   Periwound Area Scar tissue;Edematous 10/25/22 1117   Wound Edges Defined 10/25/22 1117   Wound Length (cm) 4.7 cm 10/25/22 1117   Wound Width (cm) 3 cm 10/25/22 1117   Wound Depth (cm) 0.1 cm 10/25/22 1117   Wound Volume (cm^3) 1.41 cm^3 10/25/22 1117   Wound Surface Area (cm^2) 14.1 cm^2 10/25/22 1117   Care Cleansed with:;Antimicrobial agent 10/25/22 1117   Dressing  Applied;Hydrofiber;Silver;Absorptive Pad;Rolled gauze;Tubular bandage 10/25/22 1117   Periwound Care Skin barrier film applied 10/25/22 1117   Compression Tubular elasticized bandage 10/25/22 1117            Altered Skin Integrity 05/06/22 1147 Right posterior Calf Abscess Full thickness tissue loss. Subcutaneous fat may be visible but bone, tendon or muscle are not exposed (Active)   05/06/22 1147   Altered Skin Integrity Present on Admission: yes   Side: Right   Orientation: posterior   Location: Calf   Wound Number:    Is this injury device related?: No   Primary Wound Type: Abscess   Description of Altered Skin Integrity: Full thickness tissue loss. Subcutaneous fat may be visible but bone, tendon or muscle are not exposed   Ankle-Brachial Index:    Pulses:    Removal Indication and Assessment:    Wound Outcome:    (Retired) Wound Length (cm):    (Retired) Wound Width (cm):    (Retired) Depth (cm):    Wound Description (Comments):    Removal Indications:    Wound Image   10/25/22 1117   Dressing Appearance Open to air 10/25/22 1117   Appearance Closed/resurfaced;Dry 10/25/22 1117   Periwound Area Intact 10/25/22 1117   Wound Length (cm) 0 cm 10/25/22 1117   Wound Width (cm) 0 cm 10/25/22 1117   Wound Depth (cm) 0 cm 10/25/22 1117   Wound Volume (cm^3) 0 cm^3 10/25/22 1117   Wound Surface Area (cm^2) 0 cm^2 10/25/22 1117   Compression Tubular elasticized bandage 10/25/22 1117         Assessment:     Today wound 2 posterior calf is completely healed.  Epithelial growth.  Area was covered with Band-Aid.  Were some foot with healing blister the area good epithelialization.  This was covered with Band-Aid also.  Pressure injury of right heel is smaller.  4.7 cm x 3 cm.  This area was cleaned and Opticel Ag was applied.  This was covered with gauze ABD pad and Kerlix.  Tubigrip E applied and patient will change every other day.  Patient is to return in 2 weeks for physician visit    ICD-10-CM ICD-9-CM   1. Pressure  injury of right heel, stage 3  L89.613 707.07     707.23         Plan:   Tissue pathology and/or culture taken:  [] Yes [x] No   Sharp debridement performed:   [] Yes [x] No   Labs ordered this visit:   [] Yes [x] No   Imaging ordered this visit:   [] Yes [x] No           Orders Placed This Encounter   Procedures    Change dressing     Cleanse wound with: Vashe or wound cleanser   Periwound care: skin prep periwound , allow to dry well   Primary dressing: apply Opticel Ag to open ulcers  Secondary dressing: cover with gauze, abd prn, kerlix   Offloading: elevate for edema control, keep pressure off posterior calf/heel   Edema control: Tubigrip E   Frequency: Change every other day   Follow-up: 2 weeks     Standing Status:   Standing     Number of Occurrences:   6     Standing Expiration Date:   12/25/2022        Follow up in about 2 days (around 10/27/2022) for md visit .

## 2022-10-27 ENCOUNTER — TELEPHONE (OUTPATIENT)
Dept: HEMATOLOGY/ONCOLOGY | Facility: CLINIC | Age: 57
End: 2022-10-27

## 2022-10-27 ENCOUNTER — OFFICE VISIT (OUTPATIENT)
Dept: HEMATOLOGY/ONCOLOGY | Facility: CLINIC | Age: 57
End: 2022-10-27
Payer: MEDICARE

## 2022-10-27 VITALS
HEART RATE: 120 BPM | RESPIRATION RATE: 16 BRPM | SYSTOLIC BLOOD PRESSURE: 107 MMHG | HEIGHT: 70 IN | DIASTOLIC BLOOD PRESSURE: 76 MMHG | TEMPERATURE: 98 F | BODY MASS INDEX: 41.6 KG/M2 | OXYGEN SATURATION: 98 %

## 2022-10-27 DIAGNOSIS — I82.629: ICD-10-CM

## 2022-10-27 DIAGNOSIS — R60.0 LOCALIZED EDEMA: ICD-10-CM

## 2022-10-27 PROBLEM — I82.621 ACUTE DEEP VEIN THROMBOSIS (DVT) OF BRACHIAL VEIN OF RIGHT UPPER EXTREMITY: Status: ACTIVE | Noted: 2022-09-07

## 2022-10-27 PROCEDURE — 99204 PR OFFICE/OUTPT VISIT, NEW, LEVL IV, 45-59 MIN: ICD-10-PCS | Mod: S$PBB,,, | Performed by: INTERNAL MEDICINE

## 2022-10-27 PROCEDURE — 99999 PR PBB SHADOW E&M-EST. PATIENT-LVL IV: ICD-10-PCS | Mod: PBBFAC,,, | Performed by: INTERNAL MEDICINE

## 2022-10-27 PROCEDURE — 99204 OFFICE O/P NEW MOD 45 MIN: CPT | Mod: S$PBB,,, | Performed by: INTERNAL MEDICINE

## 2022-10-27 PROCEDURE — 99999 PR PBB SHADOW E&M-EST. PATIENT-LVL IV: CPT | Mod: PBBFAC,,, | Performed by: INTERNAL MEDICINE

## 2022-10-27 PROCEDURE — 99214 OFFICE O/P EST MOD 30 MIN: CPT | Mod: PBBFAC | Performed by: INTERNAL MEDICINE
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
## 2022-11-08 ENCOUNTER — HOSPITAL ENCOUNTER (OUTPATIENT)
Dept: WOUND CARE | Facility: HOSPITAL | Age: 57
Discharge: HOME | End: 2022-11-08
Attending: PEDIATRICS
Payer: MEDICARE

## 2022-11-08 VITALS
OXYGEN SATURATION: 97 % | TEMPERATURE: 98 F | SYSTOLIC BLOOD PRESSURE: 80 MMHG | HEART RATE: 84 BPM | DIASTOLIC BLOOD PRESSURE: 57 MMHG | RESPIRATION RATE: 18 BRPM

## 2022-11-08 DIAGNOSIS — L89.613 PRESSURE INJURY OF RIGHT HEEL, STAGE 3: ICD-10-CM

## 2022-11-08 PROCEDURE — 99213 OFFICE O/P EST LOW 20 MIN: CPT | Mod: ,,, | Performed by: PEDIATRICS

## 2022-11-08 PROCEDURE — 99213 PR OFFICE/OUTPT VISIT, EST, LEVL III, 20-29 MIN: ICD-10-PCS | Mod: ,,, | Performed by: PEDIATRICS

## 2022-11-08 PROCEDURE — 99212 OFFICE O/P EST SF 10 MIN: CPT

## 2022-11-08 NOTE — PATIENT INSTRUCTIONS
Cleanse wound with: Vashe or wound cleanser   Periwound care: skin prep periwound , allow to dry well   Primary dressing: apply Opticel Ag to open ulcers   Secondary dressing: cover with gauze, abd prn, kerlix   Offloading: elevate for edema control, keep pressure off posterior calf/heel   Edema control: Tubigrip E   Frequency: Change every other day   Follow-up: 2 weeks

## 2022-11-08 NOTE — PROGRESS NOTES
Subjective:       Patient ID: Antonio Galvan II is a 57 y.o. male.    Chief Complaint: Pressure Ulcer (Pressure ulcer to R heel, follow up for re-evaluation / treatment with md/)    HPI  Review of Systems      Objective:      Temp:  [97.7 °F (36.5 °C)]   Pulse:  [84]   Resp:  [18]   BP: (80)/(57)   SpO2:  [97 %]   Physical Exam       Altered Skin Integrity 05/06/22 1140 Right Heel  Full thickness tissue loss. Subcutaneous fat may be visible but bone, tendon or muscle are not exposed (Active)   05/06/22 1140   Altered Skin Integrity Present on Admission: yes   Side: Right   Orientation:    Location: Heel   Wound Number:    Is this injury device related?: No   Primary Wound Type:    Description of Altered Skin Integrity: Full thickness tissue loss. Subcutaneous fat may be visible but bone, tendon or muscle are not exposed   Ankle-Brachial Index:    Pulses:    Removal Indication and Assessment:    Wound Outcome:    (Retired) Wound Length (cm):    (Retired) Wound Width (cm):    (Retired) Depth (cm):    Wound Description (Comments):    Removal Indications:    Wound Image   11/08/22 1121   Description of Altered Skin Integrity Full thickness tissue loss with exposed bone, tendon, or muscle. Often includes undermining and tunneling. May extend into muscle and/or supporting structures. 11/08/22 1121   Dressing Appearance Intact;Moist drainage 11/08/22 1121   Drainage Amount Moderate 11/08/22 1121   Drainage Characteristics/Odor Sanguineous;No odor;Bleeding controlled 11/08/22 1121   Appearance Red;Granulating 11/08/22 1121   Tissue loss description Full thickness 11/08/22 1121   Red (%), Wound Tissue Color 100 % 11/08/22 1121   Periwound Area Scar tissue 11/08/22 1121   Wound Edges Defined 11/08/22 1121   Wound Length (cm) 4.9 cm 11/08/22 1121   Wound Width (cm) 3.2 cm 11/08/22 1121   Wound Depth (cm) 0.1 cm 11/08/22 1121   Wound Volume (cm^3) 1.568 cm^3 11/08/22 1121   Wound Surface Area (cm^2) 15.68 cm^2 11/08/22  1121   Care Cleansed with:;Antimicrobial agent 11/08/22 1121   Dressing Applied;Hydrofiber;Silver;Gauze;Absorptive Pad;Rolled gauze;Tubular bandage 11/08/22 1121         Assessment:     Today right heel ulceration is improving in size.  Wound bed is red and granulating.  Ulcer is 4.9 cm by 3.2 cm area was cleaned with Vashe and skin prep was applied to the periwound area.  Opticel Ag was applied to the ulcer and this was covered with gauze ABD pads and Kerlix.  Tubigrip applied .   patient change dressings every other day.  Patient will return in 2 weeks for physician visit    ICD-10-CM ICD-9-CM   1. Pressure injury of right heel, stage 3  L89.613 707.07     707.23         Plan:   Tissue pathology and/or culture taken:  [] Yes [x] No   Sharp debridement performed:   [] Yes [x] No   Labs ordered this visit:   [] Yes [x] No   Imaging ordered this visit:   [] Yes [x] No           Orders Placed This Encounter   Procedures    Change dressing     Cleanse wound with: Vashe or wound cleanser   Periwound care: skin prep periwound , allow to dry well   Primary dressing: apply Opticel Ag to open ulcers  Secondary dressing: cover with gauze, abd prn, kerlix   Offloading: elevate for edema control, keep pressure off posterior calf/heel   Edema control: Tubigrip E   Frequency: Change every other day   Follow-up: 2 weeks     Standing Status:   Standing     Number of Occurrences:   1        Follow up in about 2 weeks (around 11/22/2022) for md visit .

## 2022-11-16 ENCOUNTER — HOSPITAL ENCOUNTER (OUTPATIENT)
Dept: CARDIOLOGY | Facility: HOSPITAL | Age: 57
Discharge: HOME | End: 2022-11-16
Attending: INTERNAL MEDICINE
Payer: MEDICARE

## 2022-11-16 ENCOUNTER — TELEPHONE (OUTPATIENT)
Dept: HEMATOLOGY/ONCOLOGY | Facility: CLINIC | Age: 57
End: 2022-11-16
Payer: MEDICARE

## 2022-11-16 DIAGNOSIS — R60.0 LOCALIZED EDEMA: ICD-10-CM

## 2022-11-16 DIAGNOSIS — I82.629: ICD-10-CM

## 2022-11-16 PROCEDURE — 93971 EXTREMITY STUDY: CPT | Mod: RT

## 2022-11-16 NOTE — TELEPHONE ENCOUNTER
Светлана with vascular lab called to report that patient has a superficial clot in the basilic vein. States she cannot do comparison; last niva was done at [facility].

## 2022-11-22 ENCOUNTER — HOSPITAL ENCOUNTER (OUTPATIENT)
Dept: WOUND CARE | Facility: HOSPITAL | Age: 57
Discharge: HOME | End: 2022-11-22
Attending: PEDIATRICS
Payer: MEDICARE

## 2022-11-22 VITALS
OXYGEN SATURATION: 99 % | DIASTOLIC BLOOD PRESSURE: 69 MMHG | RESPIRATION RATE: 18 BRPM | SYSTOLIC BLOOD PRESSURE: 94 MMHG | TEMPERATURE: 98 F | HEART RATE: 57 BPM

## 2022-11-22 DIAGNOSIS — L89.613 PRESSURE INJURY OF RIGHT HEEL, STAGE 3: ICD-10-CM

## 2022-11-22 PROCEDURE — 99213 OFFICE O/P EST LOW 20 MIN: CPT | Mod: ,,, | Performed by: PEDIATRICS

## 2022-11-22 PROCEDURE — 99213 PR OFFICE/OUTPT VISIT, EST, LEVL III, 20-29 MIN: ICD-10-PCS | Mod: ,,, | Performed by: PEDIATRICS

## 2022-11-22 PROCEDURE — 99213 OFFICE O/P EST LOW 20 MIN: CPT

## 2022-11-22 NOTE — PATIENT INSTRUCTIONS
Cleanse wound with: Vashe or wound cleanser   Periwound care: skin prep periwound , allow to dry well   Primary dressing: apply Opticel Ag to open ulcers on heel and anterior ankle area  Secondary dressing: cover with gauze, abd prn, kerlix   Offloading: elevate for edema control, keep pressure off posterior calf/heel   Edema control: Tubigrip E   Frequency: Change every other day   Follow-up: 2 weeks

## 2022-11-22 NOTE — PROGRESS NOTES
Subjective:       Patient ID: Antonio Galvan II is a 57 y.o. male.    Chief Complaint: Pressure Ulcer (R heel pressure ulcer MD visit and re-assessment )    HPI  Review of Systems      Objective:      Temp:  [98 °F (36.7 °C)]   Pulse:  [57]   Resp:  [18]   BP: (94)/(69)   SpO2:  [99 %]   Physical Exam       Altered Skin Integrity 05/06/22 1140 Right Heel  Full thickness tissue loss. Subcutaneous fat may be visible but bone, tendon or muscle are not exposed (Active)   05/06/22 1140   Altered Skin Integrity Present on Admission: yes   Side: Right   Orientation:    Location: Heel   Wound Number:    Is this injury device related?: No   Primary Wound Type:    Description of Altered Skin Integrity: Full thickness tissue loss. Subcutaneous fat may be visible but bone, tendon or muscle are not exposed   Ankle-Brachial Index:    Pulses:    Removal Indication and Assessment:    Wound Outcome:    (Retired) Wound Length (cm):    (Retired) Wound Width (cm):    (Retired) Depth (cm):    Wound Description (Comments):    Removal Indications:    Wound Image   11/22/22 1122   Description of Altered Skin Integrity Full thickness tissue loss. Subcutaneous fat may be visible but bone, tendon or muscle are not exposed 11/22/22 1122   Dressing Appearance Intact;Moist drainage 11/22/22 1122   Drainage Amount Moderate 11/22/22 1122   Drainage Characteristics/Odor Sanguineous;Bleeding controlled 11/22/22 1122   Appearance Red;Granulating 11/22/22 1122   Tissue loss description Full thickness 11/22/22 1122   Red (%), Wound Tissue Color 100 % 11/22/22 1122   Periwound Area Scar tissue 11/22/22 1122   Wound Edges Defined 11/22/22 1122   Wound Length (cm) 4.7 cm 11/22/22 1122   Wound Width (cm) 3.2 cm 11/22/22 1122   Wound Depth (cm) 0.1 cm 11/22/22 1122   Wound Volume (cm^3) 1.504 cm^3 11/22/22 1122   Wound Surface Area (cm^2) 15.04 cm^2 11/22/22 1122   Care Cleansed with:;Antimicrobial agent;Sterile normal saline 11/22/22 1122   Dressing  Applied;Hydrofiber;Silver;Gauze;Absorptive Pad;Rolled gauze 11/22/22 1122   Periwound Care Skin barrier film applied 11/22/22 1122   Compression Tubular elasticized bandage 11/22/22 1122         Assessment:     Today wound is 4.7 cm x 3.2 cm with 1 cm in depth.  This is well granulated.  There is some epithelialization.  This is improving.  Area was cleaned with normal saline Opticel Ag was applied to open ulcers and secondary dressings of gauze ABD pad and Kerlix.  Patient will continue to elevate for edema control and offload the heel.  Tubigrip E for compression.  Patient is to change dressings every other day and return in 2 weeks for physician visit    ICD-10-CM ICD-9-CM   1. Pressure injury of right heel, stage 3  L89.613 707.07     707.23         Plan:   Tissue pathology and/or culture taken:  [] Yes [x] No   Sharp debridement performed:   [] Yes [x] No   Labs ordered this visit:   [] Yes [x] No   Imaging ordered this visit:   [] Yes [x] No           Orders Placed This Encounter   Procedures    Change dressing     Cleanse wound with: Vashe or wound cleanser   Periwound care: skin prep periwound , allow to dry well   Primary dressing: apply Opticel Ag to open ulcers  Secondary dressing: cover with gauze, abd prn, kerlix   Offloading: elevate for edema control, keep pressure off posterior calf/heel   Edema control: Tubigrip E   Frequency: Change every other day   Follow-up: 2 weeks     Standing Status:   Standing     Number of Occurrences:   1        Follow up in about 2 weeks (around 12/6/2022) for MD Visit .

## 2022-11-29 ENCOUNTER — TELEPHONE (OUTPATIENT)
Dept: NEUROLOGY | Facility: CLINIC | Age: 57
End: 2022-11-29
Payer: MEDICARE

## 2022-11-29 NOTE — TELEPHONE ENCOUNTER
Pt having diff w tolerating CPAP; he had beltran stephenson, Dr. Nunn today; discussed he may benefit better from BiPAP  ..  S/w pt: he states that he is having diff tolerating the pressure (does have recent compliance report in chart); has only had HST; he is a quad  ..  Pt states that he has turned in machine back to the original DME: states would prefer a different company ([employer]); he was on autoPAP 5-20  ..  He has been sched for F2F on Thursday

## 2022-12-01 ENCOUNTER — OFFICE VISIT (OUTPATIENT)
Dept: NEUROLOGY | Facility: CLINIC | Age: 57
End: 2022-12-01
Payer: MEDICARE

## 2022-12-01 VITALS
SYSTOLIC BLOOD PRESSURE: 128 MMHG | DIASTOLIC BLOOD PRESSURE: 84 MMHG | WEIGHT: 260 LBS | HEIGHT: 68 IN | BODY MASS INDEX: 39.4 KG/M2

## 2022-12-01 DIAGNOSIS — G82.50 QUADRIPLEGIA: ICD-10-CM

## 2022-12-01 DIAGNOSIS — Z78.9 INTOLERANCE OF CONTINUOUS POSITIVE AIRWAY PRESSURE (CPAP) VENTILATION: ICD-10-CM

## 2022-12-01 DIAGNOSIS — G47.33 OBSTRUCTIVE SLEEP APNEA SYNDROME: Primary | ICD-10-CM

## 2022-12-01 PROCEDURE — 99214 PR OFFICE/OUTPT VISIT, EST, LEVL IV, 30-39 MIN: ICD-10-PCS | Mod: S$PBB,,, | Performed by: SPECIALIST

## 2022-12-01 PROCEDURE — 99999 PR PBB SHADOW E&M-EST. PATIENT-LVL IV: CPT | Mod: PBBFAC,,, | Performed by: SPECIALIST

## 2022-12-01 PROCEDURE — 99214 OFFICE O/P EST MOD 30 MIN: CPT | Mod: PBBFAC | Performed by: SPECIALIST

## 2022-12-01 PROCEDURE — 99999 PR PBB SHADOW E&M-EST. PATIENT-LVL IV: ICD-10-PCS | Mod: PBBFAC,,, | Performed by: SPECIALIST

## 2022-12-01 PROCEDURE — 99214 OFFICE O/P EST MOD 30 MIN: CPT | Mod: S$PBB,,, | Performed by: SPECIALIST

## 2022-12-01 RX ORDER — KETOCONAZOLE 20 MG/G
CREAM TOPICAL
Status: ON HOLD | COMMUNITY
Start: 2022-11-09 | End: 2023-09-27

## 2022-12-01 RX ORDER — CEPHALEXIN 500 MG/1
1 TABLET ORAL 2 TIMES DAILY
COMMUNITY
Start: 2022-11-02 | End: 2022-11-17 | Stop reason: ALTCHOICE

## 2022-12-01 RX ORDER — IPRATROPIUM BROMIDE 21 UG/1
SPRAY, METERED NASAL
Status: ON HOLD | COMMUNITY
Start: 2022-11-29 | End: 2023-05-25

## 2022-12-01 RX ORDER — KETOCONAZOLE 20 MG/ML
SHAMPOO, SUSPENSION TOPICAL
Status: ON HOLD | COMMUNITY
Start: 2022-11-11 | End: 2023-05-25

## 2022-12-01 RX ORDER — RIVAROXABAN 20 MG/1
20 TABLET, FILM COATED ORAL
Status: ON HOLD | COMMUNITY
Start: 2022-10-28 | End: 2023-05-25

## 2022-12-01 RX ORDER — DESONIDE 0.5 MG/G
OINTMENT TOPICAL
Status: ON HOLD | COMMUNITY
Start: 2022-12-01 | End: 2023-05-25

## 2022-12-01 NOTE — PROGRESS NOTES
Subjective:       Patient ID: Antonio Galvan II is a 57 y.o. male.    Chief Complaint: sleep apnea follow or other____    HPI:            f/u fady (Here for fady f/u; diff tolerating pap//Pt states he reported to DME since he felt pressure setting was too high; they changed mask type to full face and continued w difficulty of mask coming off of face due to such high pressure; no pressure adjustments were made. He utilized pap therapy for 2 weeks and returned machine; states ins would not cover a bipap machine until he tried cpap.)    See also notes on face sheet     Review of Systems    EPWORTH SLEEPINESS SCALE 8/9/2022   Sitting and reading 3   Watching TV 1   Sitting, inactive in a public place (e.g. a theatre or a meeting) 0   As a passenger in a car for an hour without a break 3   Lying down to rest in the afternoon when circumstances permit 0   Sitting and talking to someone 1   Sitting quietly after a lunch without alcohol 1   In a car, while stopped for a few minutes in traffic 1   Total score 10   ...          Social History     Socioeconomic History    Marital status: Single   Tobacco Use    Smoking status: Never    Smokeless tobacco: Never   Substance and Sexual Activity    Alcohol use: Never    Drug use: Never    Sexual activity: Not Currently         Current Outpatient Medications:     albuterol (PROVENTIL) 2.5 mg /3 mL (0.083 %) nebulizer solution, Inhale 2.5 mg into the lungs., Disp: , Rfl:     ALKALOL NASAL WASH Soln, 50 mLs by Nasal route once daily., Disp: , Rfl:     budesonide (PULMICORT) 0.5 mg/2 mL nebulizer solution, Take 1 ampule by nebulization once daily., Disp: , Rfl:     carvediloL (COREG) 12.5 MG tablet, , Disp: , Rfl:     cephalexin (KEFTAB) 500 mg tablet, Take 1 tablet by mouth 2 (two) times daily., Disp: , Rfl:     desonide 0.05% (DESOWEN) 0.05 % Oint, Apply topically., Disp: , Rfl:     fluticasone propionate (FLONASE) 50 mcg/actuation nasal spray, 2 sprays by Each Nostril route  "Daily., Disp: , Rfl:     insulin (BASAGLAR KWIKPEN U-100 INSULIN) glargine 100 units/mL (3mL) SubQ pen, Inject 70 Units into the skin nightly., Disp: , Rfl:     ipratropium (ATROVENT) 21 mcg (0.03 %) nasal spray, by Each Nostril route., Disp: , Rfl:     JANUVIA 50 mg Tab, Take 100 mg by mouth once daily., Disp: , Rfl:     ketoconazole (NIZORAL) 2 % cream, Apply 1 application on the skin twice a day as needed, Disp: , Rfl:     ketoconazole (NIZORAL) 2 % shampoo, Apply 1 application to the scalp 3 times weekly; Let sit for 10 minutes then rinse, Disp: , Rfl:     MYRBETRIQ 50 mg Tb24, Take 1 tablet by mouth once daily., Disp: , Rfl:     NOVOLIN R REGULAR U-100 INSULN 100 unit/mL injection, Inject 55 Units into the skin 3 (three) times daily before meals., Disp: , Rfl:     sodium chloride 3% 3 % nebulizer solution, Inhale contents of one vial (4 mL) by nebulization every 4 (four) hours as needed (Thick secretions)., Disp: 400 mL, Rfl: PRN    XARELTO 20 mg Tab, Take 20 mg by mouth., Disp: , Rfl:      Objective:        Exam:   /84 (BP Location: Left arm, Patient Position: Sitting)   Ht 5' 8" (1.727 m)   Wt 117.9 kg (260 lb)   BMI 39.53 kg/m²   Body mass index is 39.53 kg/m².    General Exam  if accompanied, by__ caregiver   body habitus_ bmi 40  mental status_alert and appropriate  oropharynx_Mallampati grade_  neck_  heart__  extremities_  skin_    Neurological    Speech __ clear   cranial nerves:  CN 2 VF_ok   CN 7_no lower face asymmetry  CN 12 tongue_ok  motor__  gait_ wchair power chair   _    Labs:      Hst 8/2022:    RDI:   64/hr  Steven:   59%  Time spent sats <88%:  158 minutes       Lengthy discussion about the risks of untreated moderate to severe obstructive sleep apnea (LIZBETH).   Testing modalities/test options for sleep apnea discussed.  Potential need for treatment of LIZBETH discussed including CPAP or Bilevel  PAP.   Alternatives to PAP discussed if PAP not ultimately tolerated.  Risks of drowsy driving " discussed.  Weight loss discussed if patient is overweight.            Assessment/Plan:       Problem List Items Addressed This Visit          Neuro    Quadriplegia       Pulmonary    Intolerance of continuous positive airway pressure (CPAP) ventilation       Other    Obstructive sleep apnea syndrome - Primary             Other comments/ follow up:            Orders Placed This Encounter   Procedures    BiPAP/CPAP Titration ((Must have dx of LIZBETH from previous sleep study)            Tracheostomy discussed   Took him on F to F tour of sleep lab and respective bedrooms to help decide which room we/he might use       Preston Roldan MD      Addendum 12.12.2022  Admitted to [facility] with respiratory failure.  Better now but long discussion with ENT Leesa Rios days ago and also discussed his case with Dr Justus Mcclure pulmonologist.     I am asking that he get non invasive ventilator equipment for home in hopes of him not being readmitted to hospital with respiratory failure again.    Waiting for in lab pap titration felt to pose a risk to his health and life in my view.     Has pre existing severe sleep apnea and autocpap not tolerated     He is quadriplegic with repeated hospitalizations due to respiratory failure  Non invasive ventilation indicated and hopefully will be better tolerated in hopes of avoiding or postponing him needing a trach.     Additional addendum:    He's still in the hospital / after being readmitted with respiratory failure.  He has chronic respiratory failure related to his quadriplegia.  Volume ventilation is indicated and I am ordering a home non invasive ventilator to be used during hours of sleep and as needed during the day.  A home BiPAP is not appropriate for this patient's ventilatory requirements.

## 2022-12-07 DIAGNOSIS — G47.33 OBSTRUCTIVE SLEEP APNEA SYNDROME: Primary | ICD-10-CM

## 2022-12-07 DIAGNOSIS — Z78.9 INTOLERANCE OF CONTINUOUS POSITIVE AIRWAY PRESSURE (CPAP) VENTILATION: ICD-10-CM

## 2022-12-07 DIAGNOSIS — J96.01 ACUTE HYPOXEMIC RESPIRATORY FAILURE: ICD-10-CM

## 2022-12-07 DIAGNOSIS — I48.92 ATRIAL FLUTTER, UNSPECIFIED TYPE: ICD-10-CM

## 2022-12-08 ENCOUNTER — TELEPHONE (OUTPATIENT)
Dept: HEMATOLOGY/ONCOLOGY | Facility: CLINIC | Age: 57
End: 2022-12-08
Payer: MEDICARE

## 2022-12-08 NOTE — TELEPHONE ENCOUNTER
"Sissy from 3rd floor at [facility] called bc pt has been inpatient since 12/4. She wanted to know "where we wanted to go from here"? She can be reached at [phone]  "

## 2022-12-08 NOTE — TELEPHONE ENCOUNTER
We saw this patient due to DVT development on therapy. Not sure what his current admission has to do with us? Do you know why is he currently admitted at [facility]? I tried to call her but she did not answer the phone.

## 2022-12-20 ENCOUNTER — HOSPITAL ENCOUNTER (OUTPATIENT)
Dept: WOUND CARE | Facility: HOSPITAL | Age: 57
Discharge: HOME | End: 2022-12-20
Attending: PEDIATRICS
Payer: MEDICARE

## 2022-12-20 VITALS
DIASTOLIC BLOOD PRESSURE: 79 MMHG | RESPIRATION RATE: 18 BRPM | SYSTOLIC BLOOD PRESSURE: 121 MMHG | OXYGEN SATURATION: 99 % | TEMPERATURE: 98 F | HEART RATE: 90 BPM

## 2022-12-20 DIAGNOSIS — L89.613 PRESSURE INJURY OF RIGHT HEEL, STAGE 3: ICD-10-CM

## 2022-12-20 PROCEDURE — 99213 PR OFFICE/OUTPT VISIT, EST, LEVL III, 20-29 MIN: ICD-10-PCS | Mod: ,,, | Performed by: PEDIATRICS

## 2022-12-20 PROCEDURE — 99213 OFFICE O/P EST LOW 20 MIN: CPT | Mod: ,,, | Performed by: PEDIATRICS

## 2022-12-20 PROCEDURE — 99213 OFFICE O/P EST LOW 20 MIN: CPT

## 2022-12-20 NOTE — PATIENT INSTRUCTIONS
Cleanse wound with: Vashe or wound cleanser   Periwound care: skin prep periwound , allow to dry well   Primary dressing: apply Opticel Ag to open ulcers on heel   Secondary dressing: cover with gauze, abd prn, kerlix   Offloading: elevate for edema control, keep pressure off posterior calf/heel   Edema control: Tubigrip E   Frequency: Change every other day   Follow-up: 1 week    Paint sacrum/buttock wounds with Milk of Magnesia and allow to dry. Once dry, may apply Zguard paste. Cover with absorptive, nonabrasive cover. BID/PRN

## 2022-12-20 NOTE — PROGRESS NOTES
Subjective:       Patient ID: Antonio Galvan II is a 57 y.o. male.    Chief Complaint: Pressure Ulcer (Pressure ulcer to R heel. MD visit and re-assessment )    HPI  Review of Systems      Objective:      Temp:  [98.2 °F (36.8 °C)]   Pulse:  [90]   Resp:  [18]   BP: (121)/(79)   SpO2:  [99 %]   Physical Exam       Altered Skin Integrity 05/06/22 1140 Right Heel  Full thickness tissue loss. Subcutaneous fat may be visible but bone, tendon or muscle are not exposed (Active)   05/06/22 1140   Altered Skin Integrity Present on Admission: yes   Side: Right   Orientation:    Location: Heel   Wound Number:    Is this injury device related?: No   Primary Wound Type:    Description of Altered Skin Integrity: Full thickness tissue loss. Subcutaneous fat may be visible but bone, tendon or muscle are not exposed   Ankle-Brachial Index:    Pulses:    Removal Indication and Assessment:    Wound Outcome:    (Retired) Wound Length (cm):    (Retired) Wound Width (cm):    (Retired) Depth (cm):    Wound Description (Comments):    Removal Indications:    Wound Image   12/20/22 1110   Description of Altered Skin Integrity Full thickness tissue loss. Subcutaneous fat may be visible but bone, tendon or muscle are not exposed 12/20/22 1110   Dressing Appearance Intact;Moist drainage 12/20/22 1110   Drainage Amount Moderate 12/20/22 1110   Drainage Characteristics/Odor Serosanguineous;No odor 12/20/22 1110   Appearance Red;Ecchymotic;Blistered;Granulating 12/20/22 1110   Tissue loss description Full thickness 12/20/22 1110   Periwound Area Blistered;Macerated 12/20/22 1110   Wound Edges Defined 12/20/22 1110   Wound Length (cm) 10 cm 12/20/22 1110   Wound Width (cm) 7 cm 12/20/22 1110   Wound Depth (cm) 0.1 cm 12/20/22 1110   Wound Volume (cm^3) 7 cm^3 12/20/22 1110   Wound Surface Area (cm^2) 70 cm^2 12/20/22 1110   Care Cleansed with:;Sterile normal saline 12/20/22 1110   Dressing Applied;Hydrofiber;Silver;Gauze;Absorptive  Pad;Rolled gauze 12/20/22 1110   Periwound Care Skin barrier film applied 12/20/22 1110   Compression Tubular elasticized bandage 12/20/22 1110         Assessment:     Today the right heel wound is blister.  There is granulating tissue.  Surrounding areas somewhat macerated.  The wound is 10 cm x 7 cm with a depth of 0.1 cm.  This is worse does patient has been in the hospital and has been sick.  Patient also has increase wound area of the buttocks.  Again this is worse because of his hospitalization.  The heel was cleaned with Vashe and Opticel Ag was applied to the open ulcers.  This was covered with gauze ABD pads and Kerlix.  Also Tubigrip E was used.  We will continue to offload posterior calf and healed.  Patient have dressings changed every other day.  Milk of Magnesia and zinc oxide pace will be applied to the buttocks wounds.  Patient will return in weeks for physician visit.    ICD-10-CM ICD-9-CM   1. Pressure injury of right heel, stage 3  L89.613 707.07     707.23         Plan:   Tissue pathology and/or culture taken:  [] Yes [x] No   Sharp debridement performed:   [] Yes [x] No   Labs ordered this visit:   [] Yes [x] No   Imaging ordered this visit:   [] Yes [x] No           Orders Placed This Encounter   Procedures    Change dressing     Cleanse wound with: Vashe or wound cleanser   Periwound care: skin prep periwound , allow to dry well   Primary dressing: apply Opticel Ag to open ulcers  Secondary dressing: cover with gauze, abd prn, kerlix   Offloading: elevate for edema control, keep pressure off posterior calf/heel   Edema control: Tubigrip E   Frequency: Change every other day   Follow-up: 2 weeks     Standing Status:   Standing     Number of Occurrences:   1        Follow up in about 1 week (around 12/27/2022) for MD Visit .

## 2022-12-27 ENCOUNTER — HOSPITAL ENCOUNTER (OUTPATIENT)
Dept: WOUND CARE | Facility: HOSPITAL | Age: 57
Discharge: HOME | End: 2022-12-27
Attending: PEDIATRICS
Payer: MEDICARE

## 2022-12-27 VITALS
DIASTOLIC BLOOD PRESSURE: 70 MMHG | RESPIRATION RATE: 18 BRPM | SYSTOLIC BLOOD PRESSURE: 112 MMHG | OXYGEN SATURATION: 99 % | TEMPERATURE: 98 F | HEART RATE: 120 BPM

## 2022-12-27 DIAGNOSIS — L89.613 PRESSURE INJURY OF RIGHT HEEL, STAGE 3: Primary | ICD-10-CM

## 2022-12-27 PROCEDURE — 99213 PR OFFICE/OUTPT VISIT, EST, LEVL III, 20-29 MIN: ICD-10-PCS | Mod: ,,, | Performed by: PEDIATRICS

## 2022-12-27 PROCEDURE — 99213 OFFICE O/P EST LOW 20 MIN: CPT | Mod: ,,, | Performed by: PEDIATRICS

## 2022-12-27 PROCEDURE — 99213 OFFICE O/P EST LOW 20 MIN: CPT

## 2022-12-27 NOTE — PROGRESS NOTES
Subjective:       Patient ID: Antonio Galvan II is a 57 y.o. male.    Chief Complaint: Pressure Ulcer (Pressure ulcer to R heel MD visit and re-assessment )    HPI  Review of Systems      Objective:      Temp:  [97.9 °F (36.6 °C)]   Pulse:  [120]   Resp:  [18]   BP: (112)/(70)   SpO2:  [99 %]   Physical Exam       Altered Skin Integrity 05/06/22 1140 Right Heel  Full thickness tissue loss. Subcutaneous fat may be visible but bone, tendon or muscle are not exposed (Active)   05/06/22 1140   Altered Skin Integrity Present on Admission: yes   Side: Right   Orientation:    Location: Heel   Wound Number:    Is this injury device related?: No   Primary Wound Type:    Description of Altered Skin Integrity: Full thickness tissue loss. Subcutaneous fat may be visible but bone, tendon or muscle are not exposed   Ankle-Brachial Index:    Pulses:    Removal Indication and Assessment:    Wound Outcome:    (Retired) Wound Length (cm):    (Retired) Wound Width (cm):    (Retired) Depth (cm):    Wound Description (Comments):    Removal Indications:    Wound Image   12/27/22 1117   Description of Altered Skin Integrity Full thickness tissue loss. Subcutaneous fat may be visible but bone, tendon or muscle are not exposed 12/27/22 1117   Dressing Appearance Intact;Moist drainage 12/27/22 1117   Drainage Amount Moderate 12/27/22 1117   Drainage Characteristics/Odor Serosanguineous;No odor 12/27/22 1117   Appearance Red;Tan;Pink;Ecchymotic;Granulating;Blistered;Slough 12/27/22 1117   Tissue loss description Full thickness 12/27/22 1117   Periwound Area Blistered 12/27/22 1117   Wound Edges Defined 12/27/22 1117   Wound Length (cm) 9.5 cm 12/27/22 1117   Wound Width (cm) 7.1 cm 12/27/22 1117   Wound Depth (cm) 0.1 cm 12/27/22 1117   Wound Volume (cm^3) 6.745 cm^3 12/27/22 1117   Wound Surface Area (cm^2) 67.45 cm^2 12/27/22 1117   Care Cleansed with:;Sterile normal saline 12/27/22 1117   Dressing  Applied;Hydrofiber;Silver;Gauze;Absorptive Pad;Rolled gauze;Tubular bandage 12/27/22 1117   Periwound Care Skin barrier film applied 12/27/22 1117   Compression Tubular elasticized bandage 12/27/22 1117         Assessment:     Today pressure ulcer of right heel is 9.5 x 7.1 cm.  Mild clear drainage.  There is granulating areas.  There is also some slough.  Wound does appear better today.  Area was cleaned with saline and Opticel Ag was applied to the ulcers in the heel and this was covered with gauze and Kerlix.  Patient will continue to offload by elevating.  Dressings will be changed every other day and patient will followup in 1 week for physician visit    ICD-10-CM ICD-9-CM   1. Pressure injury of right heel, stage 3  L89.613 707.07     707.23         Plan:   Tissue pathology and/or culture taken:  [] Yes [x] No   Sharp debridement performed:   [] Yes [x] No   Labs ordered this visit:   [] Yes [x] No   Imaging ordered this visit:   [] Yes [x] No           Orders Placed This Encounter   Procedures    Change dressing     Cleanse wound with: Vashe or wound cleanser   Periwound care: skin prep periwound , allow to dry well   Primary dressing: apply Opticel Ag to open ulcers on heel   Secondary dressing: cover with gauze, abd prn, kerlix   Offloading: elevate for edema control, keep pressure off posterior calf/heel   Edema control: Tubigrip E   Frequency: Change every other day   Follow-up: 1 week     Paint sacrum/buttock wounds with Milk of Magnesia and allow to dry. Once dry, may apply Zguard paste. Cover with absorptive, nonabrasive cover. BID/PRN     Standing Status:   Standing     Number of Occurrences:   50     Standing Expiration Date:   3/27/2023        Follow up in about 2 weeks (around 1/10/2023) for MD Visit .

## 2022-12-28 ENCOUNTER — OFFICE VISIT (OUTPATIENT)
Dept: NEUROLOGY | Facility: CLINIC | Age: 57
End: 2022-12-28
Payer: MEDICARE

## 2022-12-28 VITALS — HEIGHT: 68 IN | WEIGHT: 260 LBS | BODY MASS INDEX: 39.4 KG/M2 | RESPIRATION RATE: 20 BRPM

## 2022-12-28 DIAGNOSIS — G47.31 COMPLEX SLEEP APNEA SYNDROME: ICD-10-CM

## 2022-12-28 DIAGNOSIS — G47.33 OBSTRUCTIVE SLEEP APNEA SYNDROME: Primary | ICD-10-CM

## 2022-12-28 DIAGNOSIS — G82.50 QUADRIPLEGIA: ICD-10-CM

## 2022-12-28 PROBLEM — G47.39 COMPLEX SLEEP APNEA SYNDROME: Status: ACTIVE | Noted: 2022-12-28

## 2022-12-28 PROCEDURE — 99999 PR PBB SHADOW E&M-EST. PATIENT-LVL III: CPT | Mod: PBBFAC,,, | Performed by: SPECIALIST

## 2022-12-28 PROCEDURE — 99213 PR OFFICE/OUTPT VISIT, EST, LEVL III, 20-29 MIN: ICD-10-PCS | Mod: S$PBB,,, | Performed by: SPECIALIST

## 2022-12-28 PROCEDURE — 99999 PR PBB SHADOW E&M-EST. PATIENT-LVL III: ICD-10-PCS | Mod: PBBFAC,,, | Performed by: SPECIALIST

## 2022-12-28 PROCEDURE — 99213 OFFICE O/P EST LOW 20 MIN: CPT | Mod: PBBFAC | Performed by: SPECIALIST

## 2022-12-28 PROCEDURE — 99213 OFFICE O/P EST LOW 20 MIN: CPT | Mod: S$PBB,,, | Performed by: SPECIALIST

## 2022-12-28 RX ORDER — ESCITALOPRAM OXALATE 5 MG/1
5 TABLET ORAL DAILY
Qty: 30 TABLET | Refills: 11 | Status: ON HOLD | OUTPATIENT
Start: 2022-12-28 | End: 2023-09-27

## 2022-12-28 NOTE — PROGRESS NOTES
Subjective:       Patient ID: Antonio Galvan II is a 57 y.o. male.    Chief Complaint: sleep apnea follow or other____    HPI:            Follow-up (LIZBETH)  Tolerating trilogy    Still worried about his air cutting off with being transferred       Review of Systems    EPWORTH SLEEPINESS SCALE 8/9/2022   Sitting and reading 3   Watching TV 1   Sitting, inactive in a public place (e.g. a theatre or a meeting) 0   As a passenger in a car for an hour without a break 3   Lying down to rest in the afternoon when circumstances permit 0   Sitting and talking to someone 1   Sitting quietly after a lunch without alcohol 1   In a car, while stopped for a few minutes in traffic 1   Total score 10             Social History     Socioeconomic History    Marital status: Single   Tobacco Use    Smoking status: Never    Smokeless tobacco: Never   Substance and Sexual Activity    Alcohol use: Never    Drug use: Never    Sexual activity: Not Currently         Current Outpatient Medications:     albuterol (PROVENTIL) 2.5 mg /3 mL (0.083 %) nebulizer solution, Inhale 2.5 mg into the lungs., Disp: , Rfl:     budesonide (PULMICORT) 0.5 mg/2 mL nebulizer solution, Take 1 ampule by nebulization once daily., Disp: , Rfl:     cephalexin (KEFTAB) 500 mg tablet, Take 1 tablet by mouth 2 (two) times daily., Disp: , Rfl:     desonide 0.05% (DESOWEN) 0.05 % Oint, Apply topically., Disp: , Rfl:     fluticasone propionate (FLONASE) 50 mcg/actuation nasal spray, 2 sprays by Each Nostril route Daily., Disp: , Rfl:     insulin (BASAGLAR KWIKPEN U-100 INSULIN) glargine 100 units/mL (3mL) SubQ pen, Inject 70 Units into the skin nightly., Disp: , Rfl:     ipratropium (ATROVENT) 21 mcg (0.03 %) nasal spray, by Each Nostril route., Disp: , Rfl:     JANUVIA 50 mg Tab, Take 100 mg by mouth once daily., Disp: , Rfl:     ketoconazole (NIZORAL) 2 % shampoo, Apply 1 application to the scalp 3 times weekly; Let sit for 10 minutes then rinse, Disp: , Rfl:      "MYRBETRIQ 50 mg Tb24, Take 1 tablet by mouth once daily., Disp: , Rfl:     NOVOLIN R REGULAR U-100 INSULN 100 unit/mL injection, Inject 55 Units into the skin 3 (three) times daily before meals., Disp: , Rfl:     sodium chloride 3% 3 % nebulizer solution, Inhale contents of one vial (4 mL) by nebulization every 4 (four) hours as needed (Thick secretions)., Disp: 400 mL, Rfl: PRN    XARELTO 20 mg Tab, Take 20 mg by mouth., Disp: , Rfl:     ALKALOL NASAL WASH Soln, 50 mLs by Nasal route once daily., Disp: , Rfl:     carvediloL (COREG) 12.5 MG tablet, , Disp: , Rfl:     EScitalopram oxalate (LEXAPRO) 5 MG Tab, Take 1 tablet (5 mg total) by mouth once daily., Disp: 30 tablet, Rfl: 11    ketoconazole (NIZORAL) 2 % cream, Apply 1 application on the skin twice a day as needed, Disp: , Rfl:      Objective:        Exam:   Resp 20   Ht 5' 8" (1.727 m)   Wt 117.9 kg (260 lb)   BMI 39.53 kg/m²   Body mass index is 39.53 kg/m².    General Exam  if accompanied, by__ mother   body habitus_ obese   mental status_alert and appropriate  Neurological  Speech __ normal   Quadriplegic in motorized chair _    Labs:      Lengthy discussion about the risks of untreated moderate to severe obstructive sleep apnea (LIZBETH).   Testing modalities/test options for sleep apnea discussed.  Potential need for treatment of LIZBETH discussed including CPAP or Bilevel  PAP.   Alternatives to PAP discussed if PAP not ultimately tolerated.  Risks of drowsy driving discussed.  Weight loss discussed if patient is overweight.            Assessment/Plan:       Problem List Items Addressed This Visit          Neuro    Quadriplegia       Other    Obstructive sleep apnea syndrome - Primary    Complex sleep apnea syndrome         Other comments/ follow up:            No orders of the defined types were placed in this encounter.     Medications Ordered This Encounter   Medications    EScitalopram oxalate (LEXAPRO) 5 MG Tab     Sig: Take 1 tablet (5 mg total) by " mouth once daily.     Dispense:  30 tablet     Refill:  11      Percussion vest bid     Mucinex bid OTC  [ not DM]    Get download from [facility]     Aim revisit 3  mos     Preston Roldan MD

## 2023-01-05 ENCOUNTER — TELEPHONE (OUTPATIENT)
Dept: NEUROLOGY | Facility: CLINIC | Age: 58
End: 2023-01-05

## 2023-01-05 NOTE — TELEPHONE ENCOUNTER
----- Message from Myla Johnson sent at 1/3/2023  8:20 AM CST -----  Regarding: FW: precussion vest  Speak w/ Dr. Roldan about ordering vest for patient.     ----- Message -----  From: Myla Johnson  Sent: 1/3/2023  12:00 AM CST  To: Myla Johnson  Subject: precussion vest                                  Speak w/ gloria about vest.

## 2023-01-05 NOTE — TELEPHONE ENCOUNTER
Do I need to do something with this? According to his chart, his pulmo: Dr. Antonio Nunn ordered a Cough Assist device through [facility] 12/13, and pt rec'd on 12/21

## 2023-01-12 ENCOUNTER — HOSPITAL ENCOUNTER (OUTPATIENT)
Dept: WOUND CARE | Facility: HOSPITAL | Age: 58
Discharge: HOME | End: 2023-01-12
Attending: PEDIATRICS
Payer: MEDICARE

## 2023-01-12 VITALS
OXYGEN SATURATION: 97 % | HEART RATE: 98 BPM | TEMPERATURE: 99 F | RESPIRATION RATE: 18 BRPM | DIASTOLIC BLOOD PRESSURE: 89 MMHG | SYSTOLIC BLOOD PRESSURE: 144 MMHG

## 2023-01-12 DIAGNOSIS — L89.314 PRESSURE ULCER OF ISCHIUM, RIGHT, STAGE IV: Primary | ICD-10-CM

## 2023-01-12 DIAGNOSIS — L89.613 PRESSURE INJURY OF RIGHT HEEL, STAGE 3: ICD-10-CM

## 2023-01-12 DIAGNOSIS — L89.44 PRESSURE INJURY OF CONTIGUOUS REGION INVOLVING BACK AND BUTTOCK, STAGE 4, UNSPECIFIED LATERALITY: ICD-10-CM

## 2023-01-12 PROCEDURE — 99214 OFFICE O/P EST MOD 30 MIN: CPT | Mod: ,,, | Performed by: FAMILY MEDICINE

## 2023-01-12 PROCEDURE — 99213 OFFICE O/P EST LOW 20 MIN: CPT

## 2023-01-12 PROCEDURE — 99214 PR OFFICE/OUTPT VISIT, EST, LEVL IV, 30-39 MIN: ICD-10-PCS | Mod: ,,, | Performed by: FAMILY MEDICINE

## 2023-01-12 PROCEDURE — 87070 CULTURE OTHR SPECIMN AEROBIC: CPT

## 2023-01-12 NOTE — PROGRESS NOTES
Subjective:       Patient ID: Antonio Galvan II is a 57 y.o. male.    Chief Complaint: Pressure Ulcer (Pressure ulcer to R heel and sacrum/buttocks. MD visit and re-assessment )    HPI  Review of Systems      Objective:      Temp:  [98.8 °F (37.1 °C)]   Pulse:  [98]   Resp:  [18]   BP: (144)/(89)   SpO2:  [97 %]   Physical Exam       Altered Skin Integrity 05/06/22 1140 Right Heel  Full thickness tissue loss. Subcutaneous fat may be visible but bone, tendon or muscle are not exposed (Active)   05/06/22 1140   Altered Skin Integrity Present on Admission: yes   Side: Right   Orientation:    Location: Heel   Wound Number:    Is this injury device related?: No   Primary Wound Type:    Description of Altered Skin Integrity: Full thickness tissue loss. Subcutaneous fat may be visible but bone, tendon or muscle are not exposed   Ankle-Brachial Index:    Pulses:    Removal Indication and Assessment:    Wound Outcome:    (Retired) Wound Length (cm):    (Retired) Wound Width (cm):    (Retired) Depth (cm):    Wound Description (Comments):    Removal Indications:    Wound Image    01/12/23 1447   Description of Altered Skin Integrity Full thickness tissue loss. Subcutaneous fat may be visible but bone, tendon or muscle are not exposed 01/12/23 1447   Dressing Appearance Intact;Moist drainage 01/12/23 1447   Drainage Amount Moderate 01/12/23 1447   Drainage Characteristics/Odor Serosanguineous;No odor 01/12/23 1447   Appearance Red;Pink;Ecchymotic;Granulating 01/12/23 1447   Tissue loss description Full thickness 01/12/23 1447   Periwound Area Blistered;Other (see comments) 01/12/23 1447   Wound Edges Defined 01/12/23 1447   Wound Length (cm) 9 cm 01/12/23 1447   Wound Width (cm) 7 cm 01/12/23 1447   Wound Depth (cm) 0.1 cm 01/12/23 1447   Wound Volume (cm^3) 6.3 cm^3 01/12/23 1447   Wound Surface Area (cm^2) 63 cm^2 01/12/23 1447   Care Cleansed with:;Sterile normal saline 01/12/23 1447   Dressing Applied;Honey;Sodium  chloride impregnated;Gauze;Absorptive Pad;Rolled gauze 01/12/23 1447   Periwound Care Skin barrier film applied 01/12/23 1447   Compression Tubular elasticized bandage 01/12/23 1447            Altered Skin Integrity 01/12/23 1530 Sacral spine Full thickness tissue loss with exposed bone, tendon, or muscle. Often includes undermining and tunneling. May extend into muscle and/or supporting structures. (Active)   01/12/23 1530   Altered Skin Integrity Present on Admission:    Side:    Orientation:    Location: Sacral spine   Wound Number:    Is this injury device related?:    Primary Wound Type:    Description of Altered Skin Integrity: Full thickness tissue loss with exposed bone, tendon, or muscle. Often includes undermining and tunneling. May extend into muscle and/or supporting structures.   Ankle-Brachial Index:    Pulses:    Removal Indication and Assessment:    Wound Outcome:    (Retired) Wound Length (cm):    (Retired) Wound Width (cm):    (Retired) Depth (cm):    Wound Description (Comments):    Removal Indications:    Wound Image   01/12/23 1530   Description of Altered Skin Integrity Full thickness tissue loss with exposed bone, tendon, or muscle. Often includes undermining and tunneling. May extend into muscle and/or supporting structures. 01/12/23 1530   Dressing Appearance Intact;Moist drainage 01/12/23 1530   Drainage Amount Moderate 01/12/23 1530   Drainage Characteristics/Odor Serosanguineous;Malodorous 01/12/23 1530   Appearance Red;Maroon;Yellow;Blistered;Slough;Not granulating 01/12/23 1530   Tissue loss description Full thickness 01/12/23 1530   Periwound Area Macerated 01/12/23 1530   Wound Edges Callused 01/12/23 1530   Wound Length (cm) 10 cm 01/12/23 1530   Wound Width (cm) 13 cm 01/12/23 1530   Wound Depth (cm) 0.1 cm 01/12/23 1530   Wound Volume (cm^3) 13 cm^3 01/12/23 1530   Wound Surface Area (cm^2) 130 cm^2 01/12/23 1530   Care Cleansed with:;Sterile normal saline;Skin Barrier 01/12/23  1530   Dressing Applied;Honey;Sodium chloride impregnated;Gauze;Absorptive Pad 01/12/23 1530   Periwound Care Skin barrier film applied 01/12/23 1530            Altered Skin Integrity 01/12/23 1532 Right Ischial tuberosity Full thickness tissue loss with exposed bone, tendon, or muscle. Often includes undermining and tunneling. May extend into muscle and/or supporting structures. (Active)   01/12/23 1532   Altered Skin Integrity Present on Admission:    Side: Right   Orientation:    Location: Ischial tuberosity   Wound Number:    Is this injury device related?:    Primary Wound Type:    Description of Altered Skin Integrity: Full thickness tissue loss with exposed bone, tendon, or muscle. Often includes undermining and tunneling. May extend into muscle and/or supporting structures.   Ankle-Brachial Index:    Pulses:    Removal Indication and Assessment:    Wound Outcome:    (Retired) Wound Length (cm):    (Retired) Wound Width (cm):    (Retired) Depth (cm):    Wound Description (Comments):    Removal Indications:    Wound Image   01/12/23 1533   Dressing Appearance Intact;Moist drainage 01/12/23 1533   Drainage Amount Moderate 01/12/23 1533   Drainage Characteristics/Odor Serosanguineous;Malodorous 01/12/23 1533   Appearance Necrotic;Black;Tan;Yellow 01/12/23 1533   Tissue loss description Full thickness 01/12/23 1533   Black (%), Wound Tissue Color 100 % 01/12/23 1533   Periwound Area Scar tissue 01/12/23 1533   Wound Edges Defined 01/12/23 1533   Wound Length (cm) 10 cm 01/12/23 1533   Wound Width (cm) 10.5 cm 01/12/23 1533   Wound Surface Area (cm^2) 105 cm^2 01/12/23 1533   Care Cleansed with:;Sterile normal saline 01/12/23 1533   Dressing Applied;Honey;Sodium chloride impregnated;Gauze;Absorptive Pad 01/12/23 1533   Periwound Care Skin barrier film applied 01/12/23 1533         Assessment:     Pt examined and notes review above. New stage 4 ulcer of Right ischium and pressure ulcers in his buttocks. Apply  medihoney/mesalt to wound beds R heel/R ischium/R sacrum daily. Tubigrip E . RTC in 1 week    ICD-10-CM ICD-9-CM   1. Pressure ulcer of ischium, right, stage IV  L89.314 707.05     707.24   2. Pressure injury of right heel, stage 3  L89.613 707.07     707.23   3. 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, stage 4, unspecified laterality  L89.44 707.09     707.24         Plan:   Tissue pathology and/or culture taken:  [x] Yes [] No   Sharp debridement performed:   [] Yes [x] No   Labs ordered this visit:   [] Yes [x] No   Imaging ordered this visit:   [] Yes [x] No           Orders Placed This Encounter   Procedures    Tissue Culture - Aerobic    Change dressing     Cleanse wound with: Vashe or wound cleanser   Periwound care: skin prep periwound , allow to dry well   Primary dressing: TO ALL sites,     Apply medihoney then mesalt to wound beds ( R heel , R ischium / Sacrum)  Secondary dressing: R ischium / Sacrum - cover with gauze, abd or other absorptive dressings to ischium and sacral wounds, secure with retention tape.     Apply gauze, abd pad, kerlix, tape to R foot / heel   Offloading: elevate for edema control, keep pressure off posterior calf/heel   Edema control: Tubigrip E   Frequency: Change daily  Follow-up: 1 week     Standing Status:   Standing     Number of Occurrences:   4     Standing Expiration Date:   2/12/2023        Follow up in about 2 weeks (around 1/26/2023) for MD Visit .

## 2023-01-12 NOTE — PATIENT INSTRUCTIONS
Cleanse wound with: Vashe or wound cleanser   Periwound care: skin prep periwound , allow to dry well   Primary dressing: TO ALL sites,     Apply medihoney then mesalt to wound beds ( R heel , R ischium / Sacrum)  Secondary dressing: R ischium / Sacrum - cover with gauze, abd or other absorptive dressings to ischium and sacral wounds, secure with retention tape.     Apply gauze, abd pad, kerlix, tape to R foot / heel   Offloading: elevate for edema control, keep pressure off posterior calf/heel   Edema control: Tubigrip E   Frequency: Change daily  Follow-up: 1 week

## 2023-01-16 LAB
BACTERIA SPEC CULT: ABNORMAL

## 2023-01-17 ENCOUNTER — TELEPHONE (OUTPATIENT)
Dept: WOUND CARE | Facility: HOSPITAL | Age: 58
End: 2023-01-17
Payer: MEDICARE

## 2023-01-17 DIAGNOSIS — L89.613 PRESSURE INJURY OF RIGHT HEEL, STAGE 3: Primary | ICD-10-CM

## 2023-01-17 DIAGNOSIS — L89.314 PRESSURE ULCER OF ISCHIUM, RIGHT, STAGE IV: ICD-10-CM

## 2023-01-17 DIAGNOSIS — L89.44 PRESSURE INJURY OF CONTIGUOUS REGION INVOLVING BACK AND BUTTOCK, STAGE 4, UNSPECIFIED LATERALITY: ICD-10-CM

## 2023-01-17 RX ORDER — AMOXICILLIN AND CLAVULANATE POTASSIUM 500; 125 MG/1; MG/1
1 TABLET, FILM COATED ORAL 3 TIMES DAILY
Qty: 30 TABLET | Refills: 0 | Status: SHIPPED | OUTPATIENT
Start: 2023-01-17 | End: 2023-01-27

## 2023-01-17 RX ORDER — CIPROFLOXACIN 500 MG/1
500 TABLET ORAL EVERY 12 HOURS
Qty: 20 TABLET | Refills: 0 | Status: SHIPPED | OUTPATIENT
Start: 2023-01-17 | End: 2023-01-27

## 2023-01-17 NOTE — TELEPHONE ENCOUNTER
Received a call from patient's mother Judy regarding culture results.   Dr. Barcenas spoke to Judy regarding culture results, oral antibiotics ordered and sent to pharmacy today and recommendation for patient to present to ER at a hospital with full OR abilities for surgical debridement in OR due to co-morbidities

## 2023-01-17 NOTE — PROGRESS NOTES
Dr. Barcenas notified of culture results.  Telephone order obtained for Augmentin 500 mg po 1 tab TID x 10 days and Cipro 500mg po 1 tab BID x 10 days.  Sent electronically to pt's pharmacy.

## 2023-01-26 ENCOUNTER — HOSPITAL ENCOUNTER (OUTPATIENT)
Dept: WOUND CARE | Facility: HOSPITAL | Age: 58
Discharge: HOME | End: 2023-01-26
Attending: PEDIATRICS
Payer: MEDICARE

## 2023-01-26 VITALS
RESPIRATION RATE: 18 BRPM | OXYGEN SATURATION: 99 % | TEMPERATURE: 99 F | SYSTOLIC BLOOD PRESSURE: 108 MMHG | HEART RATE: 125 BPM | DIASTOLIC BLOOD PRESSURE: 72 MMHG

## 2023-01-26 DIAGNOSIS — L89.314 PRESSURE ULCER OF ISCHIUM, RIGHT, STAGE IV: ICD-10-CM

## 2023-01-26 DIAGNOSIS — L89.44 PRESSURE INJURY OF CONTIGUOUS REGION INVOLVING BACK AND BUTTOCK, STAGE 4, UNSPECIFIED LATERALITY: ICD-10-CM

## 2023-01-26 DIAGNOSIS — L89.613 PRESSURE INJURY OF RIGHT HEEL, STAGE 3: ICD-10-CM

## 2023-01-26 PROCEDURE — 99215 PR OFFICE/OUTPT VISIT, EST, LEVL V, 40-54 MIN: ICD-10-PCS | Mod: ,,, | Performed by: FAMILY MEDICINE

## 2023-01-26 PROCEDURE — 99215 OFFICE O/P EST HI 40 MIN: CPT | Mod: ,,, | Performed by: FAMILY MEDICINE

## 2023-01-26 PROCEDURE — 99215 OFFICE O/P EST HI 40 MIN: CPT

## 2023-01-26 NOTE — PATIENT INSTRUCTIONS
Cleanse wound with: Vashe or wound cleanser   Periwound care: skin prep periwound , allow to dry well   Primary dressing: TO ALL sites- Apply Aquacel or Opticell Ag ( R heel , R ischium / Sacrum)  Secondary dressing: R ischium / Sacrum - cover with gauze, abd or other absorptive dressings to ischium and sacral wounds, secure with retention tape.   Once dressing secured with retention tape, cover dressing with large tegaderm or wound vac drape to prevent soilage d/t incontinence.    Apply gauze, abd pad, kerlix, tape to R foot / heel   Offloading: elevate for edema control, keep pressure off posterior calf/heel; offload sacrum/R ishium   Edema control: Tubigrip E   Frequency: Every other day   Follow-up: 1 week

## 2023-01-26 NOTE — PROGRESS NOTES
Subjective:       Patient ID: Antonio Galvan II is a 57 y.o. male.    Chief Complaint: Pressure Ulcer (Pressure ulcers to R heel, R ischium, and sacrum MD visit and re-assessment. Pt is s/p surgical debridement of R ishium pressure ulcer on 01/24/23. On antibiotics )    HPI  Review of Systems      Objective:      Temp:  [98.7 °F (37.1 °C)]   Pulse:  [125]   Resp:  [18]   BP: (108)/(72)   SpO2:  [99 %]   Physical Exam       Altered Skin Integrity 05/06/22 1140 Right Heel  Full thickness tissue loss. Subcutaneous fat may be visible but bone, tendon or muscle are not exposed (Active)   05/06/22 1140   Altered Skin Integrity Present on Admission: yes   Side: Right   Orientation:    Location: Heel   Wound Number:    Is this injury device related?: No   Primary Wound Type:    Description of Altered Skin Integrity: Full thickness tissue loss. Subcutaneous fat may be visible but bone, tendon or muscle are not exposed   Ankle-Brachial Index:    Pulses:    Removal Indication and Assessment:    Wound Outcome:    (Retired) Wound Length (cm):    (Retired) Wound Width (cm):    (Retired) Depth (cm):    Wound Description (Comments):    Removal Indications:    Wound Image   01/26/23 1400   Description of Altered Skin Integrity Full thickness tissue loss. Subcutaneous fat may be visible but bone, tendon or muscle are not exposed 01/26/23 1400   Dressing Appearance Intact;Moist drainage 01/26/23 1400   Drainage Amount Moderate 01/26/23 1400   Drainage Characteristics/Odor Serosanguineous;No odor;Bleeding controlled 01/26/23 1400   Appearance Red;Pink;Slough;Ecchymotic;Granulating 01/26/23 1400   Tissue loss description Full thickness 01/26/23 1400   Red (%), Wound Tissue Color 75 % 01/26/23 1400   Periwound Area Other (see comments) 01/26/23 1400   Wound Edges Irregular 01/26/23 1400   Wound Length (cm) 5.1 cm 01/26/23 1400   Wound Width (cm) 5.3 cm 01/26/23 1400   Wound Depth (cm) 0.1 cm 01/26/23 1400   Wound Volume (cm^3)  2.703 cm^3 01/26/23 1400   Wound Surface Area (cm^2) 27.03 cm^2 01/26/23 1400   Care Cleansed with:;Sterile normal saline 01/26/23 1400   Dressing Applied;Hydrofiber;Silver;Gauze;Absorptive Pad;Rolled gauze;Tubular bandage 01/26/23 1400   Periwound Care Skin barrier film applied 01/26/23 1400   Compression Tubular elasticized bandage 01/26/23 1400            Altered Skin Integrity 01/12/23 1530 Sacral spine Full thickness tissue loss with exposed bone, tendon, or muscle. Often includes undermining and tunneling. May extend into muscle and/or supporting structures. (Active)   01/12/23 1530   Altered Skin Integrity Present on Admission:    Side:    Orientation:    Location: Sacral spine   Wound Number:    Is this injury device related?:    Primary Wound Type:    Description of Altered Skin Integrity: Full thickness tissue loss with exposed bone, tendon, or muscle. Often includes undermining and tunneling. May extend into muscle and/or supporting structures.   Ankle-Brachial Index:    Pulses:    Removal Indication and Assessment:    Wound Outcome:    (Retired) Wound Length (cm):    (Retired) Wound Width (cm):    (Retired) Depth (cm):    Wound Description (Comments):    Removal Indications:    Wound Image   01/26/23 1400   Description of Altered Skin Integrity Full thickness tissue loss with exposed bone, tendon, or muscle. Often includes undermining and tunneling. May extend into muscle and/or supporting structures. 01/26/23 1400   Dressing Appearance Intact;Moist drainage 01/26/23 1400   Drainage Amount Moderate 01/26/23 1400   Drainage Characteristics/Odor Serosanguineous 01/26/23 1400   Appearance Pink;Red;Tan;Slough;Fibrin;Granulating;Epithelialization 01/26/23 1400   Tissue loss description Full thickness 01/26/23 1400   Periwound Area Macerated 01/26/23 1400   Wound Edges Callused 01/26/23 1400   Wound Length (cm) 13 cm 01/26/23 1400   Wound Width (cm) 8 cm 01/26/23 1400   Wound Depth (cm) 0.1 cm 01/26/23 1400    Wound Volume (cm^3) 10.4 cm^3 01/26/23 1400   Wound Surface Area (cm^2) 104 cm^2 01/26/23 1400   Dressing Applied;Hydrofiber;Silver;Gauze;Absorptive Pad;Transparent film 01/26/23 1400   Periwound Care Skin barrier film applied 01/26/23 1400            Altered Skin Integrity 01/12/23 1532 Right Ischial tuberosity Full thickness tissue loss with exposed bone, tendon, or muscle. Often includes undermining and tunneling. May extend into muscle and/or supporting structures. (Active)   01/12/23 1532   Altered Skin Integrity Present on Admission:    Side: Right   Orientation:    Location: Ischial tuberosity   Wound Number:    Is this injury device related?:    Primary Wound Type:    Description of Altered Skin Integrity: Full thickness tissue loss with exposed bone, tendon, or muscle. Often includes undermining and tunneling. May extend into muscle and/or supporting structures.   Ankle-Brachial Index:    Pulses:    Removal Indication and Assessment:    Wound Outcome:    (Retired) Wound Length (cm):    (Retired) Wound Width (cm):    (Retired) Depth (cm):    Wound Description (Comments):    Removal Indications:    Wound Image   01/26/23 1400   Dressing Appearance Intact;Moist drainage 01/26/23 1400   Drainage Amount Large 01/26/23 1400   Drainage Characteristics/Odor Serosanguineous 01/26/23 1400   Appearance Black;Tan;Yellow;Red;Fibrin;Adipose;Other (see comments);Not granulating;Slough 01/26/23 1400   Tissue loss description Full thickness 01/26/23 1400   Periwound Area Scar tissue 01/26/23 1400   Wound Edges Defined 01/26/23 1400   Wound Length (cm) 9 cm 01/26/23 1400   Wound Width (cm) 8.2 cm 01/26/23 1400   Wound Depth (cm) 3.5 cm 01/26/23 1400   Wound Volume (cm^3) 258.3 cm^3 01/26/23 1400   Wound Surface Area (cm^2) 73.8 cm^2 01/26/23 1400   Care Cleansed with:;Sterile normal saline 01/26/23 1400   Dressing Applied;Hydrofiber;Silver;Gauze;Absorptive Pad;Transparent film 01/26/23 1400   Periwound Care Skin barrier  film applied 01/26/23 1400         Assessment:     Pt examined and notes review above. The wound in her back/buttocks was debrided by DR Sanchez in [facility]. Clean the wound with vashe, Apply aquacel Ag or opticell Ag to Rheel/R ischium/Sacrum wound beds qod. We are going to send papers to his insurance to get a wound vac for his Stage III pressure ulcer of his R ischium. Pt to start taking Vit C/D/PATRICIA for oprtimal hea;ling. RTC in 1 week    ICD-10-CM ICD-9-CM   1. Pressure injury of right heel, stage 3  L89.613 707.07     707.23   2. Pressure ulcer of ischium, right, stage IV  L89.314 707.05     707.24   3. 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, stage 4, unspecified laterality  L89.44 707.09     707.24         Plan:   Tissue pathology and/or culture taken:  [] Yes [x] No   Sharp debridement performed:   [] Yes [x] No   Labs ordered this visit:   [] Yes [x] No   Imaging ordered this visit:   [] Yes [x] No           Orders Placed This Encounter   Procedures    Change dressing     Cleanse wound with: Vashe   Periwound care: skin prep periwound , allow to dry well   Primary dressing: (To R heel, R ischium, Sacrum): Aquacel Ag or Opticell Ag  Secondary dressing: R ischium / Sacrum - cover with gauze, abd or other absorptive dressings, secure with retention tape.  Once dressing secured, cover with large tegaderm or wound vac drape to prevent frequent soilage d/t paraplegia and incontinence   Apply gauze, abd pad, kerlix, tape to R foot / heel   Offloading: elevate for edema control, keep pressure off posterior calf/heel; offload from R ischium and sacrum   Edema control: Tubigrip E   Frequency: Change every other day or PRN soilage/dislodgement   Follow-up: 1 week     Standing Status:   Standing     Number of Occurrences:   50     Standing Expiration Date:   4/26/2023    Change dressing     Order Wound Vac for Stg III pressure ulcer to R ischium.        Follow up in about 1 week  (around 2/2/2023) for MD Visit .

## 2023-02-02 ENCOUNTER — HOSPITAL ENCOUNTER (OUTPATIENT)
Dept: WOUND CARE | Facility: HOSPITAL | Age: 58
Discharge: HOME | End: 2023-02-02
Attending: PEDIATRICS
Payer: MEDICARE

## 2023-02-02 VITALS
TEMPERATURE: 99 F | DIASTOLIC BLOOD PRESSURE: 88 MMHG | HEART RATE: 124 BPM | RESPIRATION RATE: 18 BRPM | SYSTOLIC BLOOD PRESSURE: 141 MMHG | OXYGEN SATURATION: 100 %

## 2023-02-02 DIAGNOSIS — L89.44 PRESSURE INJURY OF CONTIGUOUS REGION INVOLVING BACK AND BUTTOCK, STAGE 4, UNSPECIFIED LATERALITY: ICD-10-CM

## 2023-02-02 DIAGNOSIS — L89.613 PRESSURE INJURY OF RIGHT HEEL, STAGE 3: ICD-10-CM

## 2023-02-02 DIAGNOSIS — L89.314 PRESSURE ULCER OF ISCHIUM, RIGHT, STAGE IV: ICD-10-CM

## 2023-02-02 PROCEDURE — 99213 OFFICE O/P EST LOW 20 MIN: CPT

## 2023-02-02 PROCEDURE — 99213 OFFICE O/P EST LOW 20 MIN: CPT | Mod: ,,, | Performed by: SURGERY

## 2023-02-02 PROCEDURE — 99213 PR OFFICE/OUTPT VISIT, EST, LEVL III, 20-29 MIN: ICD-10-PCS | Mod: ,,, | Performed by: SURGERY

## 2023-02-02 NOTE — PATIENT INSTRUCTIONS
Cleanse wound with: Vashe or wound cleanser   Periwound care: skin prep periwound , allow to dry well   Primary dressing: To R heel- Apply Aquacel or Opticell Ag. Medihoney and mesalt to R ischium and sacrum  Secondary dressing: R ischium / Sacrum - cover with gauze, abd or other absorptive dressings to ischium and sacral wounds, secure with retention tape.   Once dressing secured with retention tape, cover dressing with large tegaderm or wound vac drape to prevent soilage d/t incontinence.    Apply gauze, abd pad, kerlix, tape to R foot / heel   Offloading: elevate for edema control, keep pressure off posterior calf/heel; offload sacrum/R ishium   Edema control: Tubigrip E   Frequency: Every other day   Follow-up: 1 week

## 2023-02-02 NOTE — PROGRESS NOTES
Subjective:       Patient ID: Antonio Galvan II is a 57 y.o. male.    Chief Complaint: Pressure Ulcer (Pressure ulcers to R heel, sacrum and R ischium, MD visit and re-assessment. Wound vac ordered, delivery delayed d/t inclement weather )    HPI  Review of Systems      Objective:      Temp:  [98.5 °F (36.9 °C)]   Pulse:  [124]   Resp:  [18]   BP: (141)/(88)   SpO2:  [100 %]   Physical Exam       Altered Skin Integrity 05/06/22 1140 Right Heel  Full thickness tissue loss. Subcutaneous fat may be visible but bone, tendon or muscle are not exposed (Active)   05/06/22 1140   Altered Skin Integrity Present on Admission: yes   Side: Right   Orientation:    Location: Heel   Wound Number:    Is this injury device related?: No   Primary Wound Type:    Description of Altered Skin Integrity: Full thickness tissue loss. Subcutaneous fat may be visible but bone, tendon or muscle are not exposed   Ankle-Brachial Index:    Pulses:    Removal Indication and Assessment:    Wound Outcome:    (Retired) Wound Length (cm):    (Retired) Wound Width (cm):    (Retired) Depth (cm):    Wound Description (Comments):    Removal Indications:    Wound Image   02/02/23 1052   Description of Altered Skin Integrity Full thickness tissue loss. Subcutaneous fat may be visible but bone, tendon or muscle are not exposed 02/02/23 1052   Dressing Appearance Intact;Moist drainage 02/02/23 1052   Drainage Amount Moderate 02/02/23 1052   Drainage Characteristics/Odor Serosanguineous;No odor 02/02/23 1052   Appearance Red;Pink;Black;Tan;Granulating;Ecchymotic 02/02/23 1052   Tissue loss description Full thickness 02/02/23 1052   Black (%), Wound Tissue Color 5 % 02/02/23 1052   Red (%), Wound Tissue Color 80 % 02/02/23 1052   Yellow (%), Wound Tissue Color 15 % 02/02/23 1052   Periwound Area Denuded;Other (see comments) 02/02/23 1052   Wound Edges Irregular 02/02/23 1052   Wound Length (cm) 6 cm 02/02/23 1052   Wound Width (cm) 5 cm 02/02/23 1052    Wound Depth (cm) 0.1 cm 02/02/23 1052   Wound Volume (cm^3) 3 cm^3 02/02/23 1052   Wound Surface Area (cm^2) 30 cm^2 02/02/23 1052   Care Cleansed with:;Antimicrobial agent;Sterile normal saline 02/02/23 1052   Dressing Applied;Hydrofiber;Silver;Gauze;Absorptive Pad;Rolled gauze 02/02/23 1052   Periwound Care Skin barrier film applied 02/02/23 1052   Compression Tubular elasticized bandage 02/02/23 1052            Altered Skin Integrity 01/12/23 1530 Sacral spine Full thickness tissue loss with exposed bone, tendon, or muscle. Often includes undermining and tunneling. May extend into muscle and/or supporting structures. (Active)   01/12/23 1530   Altered Skin Integrity Present on Admission:    Side:    Orientation:    Location: Sacral spine   Wound Number:    Is this injury device related?:    Primary Wound Type:    Description of Altered Skin Integrity: Full thickness tissue loss with exposed bone, tendon, or muscle. Often includes undermining and tunneling. May extend into muscle and/or supporting structures.   Ankle-Brachial Index:    Pulses:    Removal Indication and Assessment:    Wound Outcome:    (Retired) Wound Length (cm):    (Retired) Wound Width (cm):    (Retired) Depth (cm):    Wound Description (Comments):    Removal Indications:    Wound Image   02/02/23 1131   Description of Altered Skin Integrity Full thickness tissue loss with exposed bone, tendon, or muscle. Often includes undermining and tunneling. May extend into muscle and/or supporting structures. 02/02/23 1131   Dressing Appearance Intact;Moist drainage 02/02/23 1131   Drainage Amount Moderate 02/02/23 1131   Drainage Characteristics/Odor Serosanguineous 02/02/23 1131   Appearance Pink;Red;Tan;Slough;Granulating;Fibrin;Epithelialization 02/02/23 1131   Tissue loss description Full thickness 02/02/23 1131   Periwound Area Macerated 02/02/23 1131   Wound Edges Callused 02/02/23 1131   Wound Length (cm) 10 cm 02/02/23 1131   Wound Width (cm) 8  cm 02/02/23 1131   Wound Depth (cm) 0.1 cm 02/02/23 1131   Wound Volume (cm^3) 8 cm^3 02/02/23 1131   Wound Surface Area (cm^2) 80 cm^2 02/02/23 1131   Care Cleansed with:;Sterile normal saline 02/02/23 1131   Dressing Applied;Honey;Sodium chloride impregnated;Gauze;Absorptive Pad 02/02/23 1131   Periwound Care Skin barrier film applied 02/02/23 1131            Altered Skin Integrity 01/12/23 1532 Right Ischial tuberosity Full thickness tissue loss with exposed bone, tendon, or muscle. Often includes undermining and tunneling. May extend into muscle and/or supporting structures. (Active)   01/12/23 1532   Altered Skin Integrity Present on Admission:    Side: Right   Orientation:    Location: Ischial tuberosity   Wound Number:    Is this injury device related?:    Primary Wound Type:    Description of Altered Skin Integrity: Full thickness tissue loss with exposed bone, tendon, or muscle. Often includes undermining and tunneling. May extend into muscle and/or supporting structures.   Ankle-Brachial Index:    Pulses:    Removal Indication and Assessment:    Wound Outcome:    (Retired) Wound Length (cm):    (Retired) Wound Width (cm):    (Retired) Depth (cm):    Wound Description (Comments):    Removal Indications:    Wound Image   02/02/23 1131   Dressing Appearance Intact;Moist drainage 02/02/23 1131   Drainage Amount Moderate 02/02/23 1131   Drainage Characteristics/Odor Serosanguineous 02/02/23 1131   Appearance Black;Tan;Adipose;Not granulating;Pink;Slough 02/02/23 1131   Tissue loss description Full thickness 02/02/23 1131   Periwound Area Scar tissue 02/02/23 1131   Wound Edges Defined 02/02/23 1131   Wound Length (cm) 11 cm 02/02/23 1131   Wound Width (cm) 8 cm 02/02/23 1131   Wound Depth (cm) 3.3 cm 02/02/23 1131   Wound Volume (cm^3) 290.4 cm^3 02/02/23 1131   Wound Surface Area (cm^2) 88 cm^2 02/02/23 1131   Care Cleansed with:;Sterile normal saline 02/02/23 1131   Dressing Applied;Honey;Sodium chloride  impregnated;Gauze;Absorptive Pad 02/02/23 1131   Periwound Care Skin barrier film applied 02/02/23 1131         Assessment:     Patient sp excisional debridement of the right ischial wound with good effect. All other wounds are healing and show no signs of infection.     ICD-10-CM ICD-9-CM   1. Pressure injury of right heel, stage 3  L89.613 707.07     707.23   2. Pressure ulcer of ischium, right, stage IV  L89.314 707.05     707.24   3. 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 Awaiting wound vac approval and arrival of equipment.  It is my opinion that the patient wound benefit from additional Skilled nursing services due to the complexity and number of his wounds. His current service providers are unable to perform his needed wound care. He will need a higher level of skilled nursing once he has his NPWT device.  Plan:   Tissue pathology and/or culture taken:  [] Yes [x] No   Sharp debridement performed:   [] Yes [x] No   Labs ordered this visit:   [] Yes [x] No   Imaging ordered this visit:   [] Yes [x] No           Orders Placed This Encounter   Procedures    Change dressing     Cleanse wound with: Vashe or wound cleanser   Periwound care: skin prep periwound , allow to dry well   Primary dressing: To R heel- Apply Aquacel or Opticell Ag. Medihoney and mesalt to R ischium and sacrum  Secondary dressing: R ischium / Sacrum - cover with gauze, abd or other absorptive dressings to ischium and sacral wounds, secure with retention tape.   Once dressing secured with retention tape, cover dressing with large tegaderm or wound vac drape to prevent soilage d/t incontinence.    Apply gauze, abd pad, kerlix, tape to R foot / heel   Offloading: elevate for edema control, keep pressure off posterior calf/heel; offload sacrum/R ishium   Edema control: Tubigrip E   Frequency: Every other day   Follow-up: 1  week     Standing Status:   Standing     Number of Occurrences:   20     Standing Expiration Date:   5/2/2023        Follow up in about 1 week (around 2/9/2023) for MD Visit .

## 2023-02-09 ENCOUNTER — TELEPHONE (OUTPATIENT)
Dept: WOUND CARE | Facility: HOSPITAL | Age: 58
End: 2023-02-09

## 2023-02-09 ENCOUNTER — HOSPITAL ENCOUNTER (OUTPATIENT)
Dept: WOUND CARE | Facility: HOSPITAL | Age: 58
Discharge: HOME | End: 2023-02-09
Attending: PEDIATRICS
Payer: MEDICARE

## 2023-02-09 VITALS
HEART RATE: 123 BPM | OXYGEN SATURATION: 100 % | SYSTOLIC BLOOD PRESSURE: 122 MMHG | TEMPERATURE: 98 F | RESPIRATION RATE: 18 BRPM | DIASTOLIC BLOOD PRESSURE: 76 MMHG

## 2023-02-09 DIAGNOSIS — L89.610 PRESSURE INJURY OF RIGHT HEEL, UNSTAGEABLE: Primary | ICD-10-CM

## 2023-02-09 DIAGNOSIS — L89.310 PRESSURE INJURY OF RIGHT ISCHIUM, UNSTAGEABLE: ICD-10-CM

## 2023-02-09 DIAGNOSIS — L89.44 PRESSURE INJURY OF CONTIGUOUS REGION INVOLVING BACK AND BUTTOCK, STAGE 4, UNSPECIFIED LATERALITY: ICD-10-CM

## 2023-02-09 PROCEDURE — 99213 PR OFFICE/OUTPT VISIT, EST, LEVL III, 20-29 MIN: ICD-10-PCS | Mod: ,,, | Performed by: PEDIATRICS

## 2023-02-09 PROCEDURE — 99213 OFFICE O/P EST LOW 20 MIN: CPT

## 2023-02-09 PROCEDURE — 87077 CULTURE AEROBIC IDENTIFY: CPT | Mod: 59

## 2023-02-09 PROCEDURE — 99213 OFFICE O/P EST LOW 20 MIN: CPT | Mod: ,,, | Performed by: PEDIATRICS

## 2023-02-09 RX ORDER — LEVOFLOXACIN 750 MG/1
750 TABLET ORAL DAILY
Qty: 10 TABLET | Refills: 0 | Status: SHIPPED | OUTPATIENT
Start: 2023-02-09 | End: 2023-02-19

## 2023-02-09 RX ORDER — AMOXICILLIN AND CLAVULANATE POTASSIUM 500; 125 MG/1; MG/1
1 TABLET, FILM COATED ORAL 3 TIMES DAILY
Qty: 30 TABLET | Refills: 0 | Status: SHIPPED | OUTPATIENT
Start: 2023-02-09 | End: 2023-02-19

## 2023-02-09 NOTE — PROGRESS NOTES
Subjective:       Patient ID: Antonio Galvan II is a 57 y.o. male.    Chief Complaint: Pressure Ulcer (R ischium pressure ulcer.   R heel pressure ulcer.  Follow up with md for re-evaluation and treatment.  )    HPI  Review of Systems      Objective:      Temp:  [98.3 °F (36.8 °C)]   Pulse:  [123]   Resp:  [18]   BP: (122)/(76)   SpO2:  [100 %]   Physical Exam       Altered Skin Integrity 05/06/22 1140 Right Heel  Full thickness tissue loss. Subcutaneous fat may be visible but bone, tendon or muscle are not exposed (Active)   05/06/22 1140   Altered Skin Integrity Present on Admission: yes   Side: Right   Orientation:    Location: Heel   Wound Number:    Is this injury device related?: No   Primary Wound Type:    Description of Altered Skin Integrity: Full thickness tissue loss. Subcutaneous fat may be visible but bone, tendon or muscle are not exposed   Ankle-Brachial Index:    Pulses:    Removal Indication and Assessment:    Wound Outcome:    (Retired) Wound Length (cm):    (Retired) Wound Width (cm):    (Retired) Depth (cm):    Wound Description (Comments):    Removal Indications:    Wound Image   02/09/23 1325   Description of Altered Skin Integrity Full thickness tissue loss. Base is covered by slough and/or eschar in the wound bed 02/09/23 1325   Dressing Appearance Intact;Moist drainage 02/09/23 1325   Drainage Amount Moderate 02/09/23 1325   Drainage Characteristics/Odor Serosanguineous;No odor;Bleeding controlled 02/09/23 1325   Appearance Red;Granulating;Purple;Maroon;Black;Necrotic 02/09/23 1325   Tissue loss description Full thickness 02/09/23 1325   Black (%), Wound Tissue Color 40 % 02/09/23 1325   Red (%), Wound Tissue Color 60 % 02/09/23 1325   Periwound Area Scar tissue 02/09/23 1325   Wound Edges Defined 02/09/23 1325   Wound Length (cm) 6 cm 02/09/23 1325   Wound Width (cm) 5 cm 02/09/23 1325   Wound Depth (cm) 0.1 cm 02/09/23 1325   Wound Volume (cm^3) 3 cm^3 02/09/23 1325   Wound Surface  Area (cm^2) 30 cm^2 02/09/23 1325   Care Cleansed with:;Sterile normal saline 02/09/23 1325   Dressing Applied;Hydrofiber;Silver;Gauze;Absorptive Pad;Rolled gauze;Tubular bandage 02/09/23 1325   Periwound Care Skin barrier film applied 02/09/23 1325   Compression Tubular elasticized bandage 02/09/23 1325   Off Loading Other (see comments) 02/09/23 1325            Altered Skin Integrity 01/12/23 1530 Sacral spine Full thickness tissue loss with exposed bone, tendon, or muscle. Often includes undermining and tunneling. May extend into muscle and/or supporting structures. (Active)   01/12/23 1530   Altered Skin Integrity Present on Admission:    Side:    Orientation:    Location: Sacral spine   Wound Number:    Is this injury device related?:    Primary Wound Type:    Description of Altered Skin Integrity: Full thickness tissue loss with exposed bone, tendon, or muscle. Often includes undermining and tunneling. May extend into muscle and/or supporting structures.   Ankle-Brachial Index:    Pulses:    Removal Indication and Assessment:    Wound Outcome:    (Retired) Wound Length (cm):    (Retired) Wound Width (cm):    (Retired) Depth (cm):    Wound Description (Comments):    Removal Indications:    Wound Image   02/09/23 1325   Description of Altered Skin Integrity Full thickness tissue loss. Subcutaneous fat may be visible but bone, tendon or muscle are not exposed 02/09/23 1325   Dressing Appearance Intact;Moist drainage 02/09/23 1325   Drainage Amount Moderate 02/09/23 1325   Drainage Characteristics/Odor Serosanguineous 02/09/23 1325   Appearance Red;Granulating;Tan;White 02/09/23 1325   Tissue loss description Full thickness 02/09/23 1325   Periwound Area Macerated 02/09/23 1325   Wound Edges Callused 02/09/23 1325   Wound Length (cm) 10.5 cm 02/09/23 1325   Wound Width (cm) 5 cm 02/09/23 1325   Wound Depth (cm) 0.5 cm 02/09/23 1325   Wound Volume (cm^3) 26.25 cm^3 02/09/23 1325   Wound Surface Area (cm^2) 52.5  cm^2 02/09/23 1325   Care Cleansed with:;Sterile normal saline 02/09/23 1325   Dressing Applied;Hydrofiber;Silver;Gauze;Absorptive Pad;Transparent film 02/09/23 1325   Periwound Care Skin barrier film applied 02/09/23 1325            Altered Skin Integrity 01/12/23 1532 Right Ischial tuberosity Full thickness tissue loss with exposed bone, tendon, or muscle. Often includes undermining and tunneling. May extend into muscle and/or supporting structures. (Active)   01/12/23 1532   Altered Skin Integrity Present on Admission:    Side: Right   Orientation:    Location: Ischial tuberosity   Wound Number:    Is this injury device related?:    Primary Wound Type:    Description of Altered Skin Integrity: Full thickness tissue loss with exposed bone, tendon, or muscle. Often includes undermining and tunneling. May extend into muscle and/or supporting structures.   Ankle-Brachial Index:    Pulses:    Removal Indication and Assessment:    Wound Outcome:    (Retired) Wound Length (cm):    (Retired) Wound Width (cm):    (Retired) Depth (cm):    Wound Description (Comments):    Removal Indications:    Wound Image   02/09/23 1325   Dressing Appearance Intact;Moist drainage 02/09/23 1325   Drainage Amount Large 02/09/23 1325   Drainage Characteristics/Odor Serous;Yellow;No odor;Bleeding controlled 02/09/23 1325   Appearance Red;Granulating;Purple;Black;Maroon;Tan;Eschar 02/09/23 1325   Tissue loss description Full thickness 02/09/23 1325   Black (%), Wound Tissue Color 40 % 02/09/23 1325   Red (%), Wound Tissue Color 60 % 02/09/23 1325   Periwound Area Denuded;Ecchymotic 02/09/23 1325   Wound Edges Defined 02/09/23 1325   Wound Length (cm) 15.5 cm 02/09/23 1325   Wound Width (cm) 11 cm 02/09/23 1325   Wound Depth (cm) 3.1 cm 02/09/23 1325   Wound Volume (cm^3) 528.55 cm^3 02/09/23 1325   Wound Surface Area (cm^2) 170.5 cm^2 02/09/23 1325   Care Cleansed with:;Sterile normal saline 02/09/23 1325   Dressing  Applied;Hydrofiber;Silver;Gauze;Absorptive Pad;Transparent film 02/09/23 1325   Periwound Care Skin barrier film applied 02/09/23 1325            Altered Skin Integrity 02/09/23 1324 Right anterior Ankle Ulceration Full thickness tissue loss. Subcutaneous fat may be visible but bone, tendon or muscle are not exposed (Active)   02/09/23 1324   Altered Skin Integrity Present on Admission: yes   Side: Right   Orientation: anterior   Location: Ankle   Wound Number:    Is this injury device related?:    Primary Wound Type: Ulceration   Description of Altered Skin Integrity: Full thickness tissue loss. Subcutaneous fat may be visible but bone, tendon or muscle are not exposed   Ankle-Brachial Index:    Pulses:    Removal Indication and Assessment:    Wound Outcome:    (Retired) Wound Length (cm):    (Retired) Wound Width (cm):    (Retired) Depth (cm):    Wound Description (Comments):    Removal Indications:    Wound Image   02/09/23 1325   Description of Altered Skin Integrity Full thickness tissue loss. Subcutaneous fat may be visible but bone, tendon or muscle are not exposed 02/09/23 1325   Dressing Appearance Intact;Moist drainage 02/09/23 1325   Drainage Amount Moderate 02/09/23 1325   Drainage Characteristics/Odor Green 02/09/23 1325   Appearance Red;Yellow 02/09/23 1325   Tissue loss description Full thickness 02/09/23 1325   Periwound Area Intact 02/09/23 1325   Wound Edges Defined 02/09/23 1325   Wound Length (cm) 1 cm 02/09/23 1325   Wound Width (cm) 0.8 cm 02/09/23 1325   Wound Depth (cm) 0.2 cm 02/09/23 1325   Wound Volume (cm^3) 0.16 cm^3 02/09/23 1325   Wound Surface Area (cm^2) 0.8 cm^2 02/09/23 1325   Care Cleansed with:;Sterile normal saline 02/09/23 1325   Dressing Applied;Hydrofiber;Silver;Bandaid 02/09/23 1325         Assessment:     Patient comes in today for evaluation of his multiple wounds.  Today there is drainage from his heel wound..  Right heel also has 40% necrotic tissue today.  This appears  to be secondary to infection as opposed to pressure.  This area was cleaned and a tissue culture taken.  There is also an anterior right ankle wound which has reopened and is draining greenish tinted fluid.  The wound of the right ischial tuberosity again has large amount of necrotic tissue and bruising.  Tissue is somewhat pale in general.  Tissue culture was taken at this area.  All these wounds are much worse than 1 week ago.  Sacral wound is granulating well.  All areas were cleaned with Vashe and Aquacel Ag was applied and areas covered with gauze and ABD pads.  Edema control achieved with Tubigrip E. until cultures have been resulted patient was started on Augmentin and Levaquin.  Dressings will be changed daily.  Patient is to return in 1 week for MD visit.    ICD-10-CM ICD-9-CM   1. Pressure injury of right heel, unstageable  L89.610 707.07     707.25   2. Pressure injury of right ischium, unstageable  L89.310 707.05     707.25   3. 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, stage 4, unspecified laterality  L89.44 707.09     707.24         Plan:   Tissue pathology and/or culture taken:  [x] Yes [] No   Sharp debridement performed:   [] Yes [x] No   Labs ordered this visit:   [x] Yes [] No   Imaging ordered this visit:   [] Yes [x] No           Orders Placed This Encounter   Procedures    Tissue Culture - Aerobic    Tissue Culture - Aerobic    CBC Auto Differential     Standing Status:   Future     Standing Expiration Date:   4/9/2024    Basic Metabolic Panel     Standing Status:   Future     Standing Expiration Date:   4/9/2024    Change dressing     Cleanse wound with: Vashe or wound cleanser   Periwound care: skin prep periwound , allow to dry well   Primary dressing: To R heel. / Right anterior ankle, sacrum and ischium ulcers -  Apply Aquacel or Opticell Ag.   Secondary dressing: R ischium / Sacrum - cover with gauze, abd or other absorptive dressings to ischium and sacral wounds,  secure with retention tape.   Once dressing secured with retention tape, cover dressing with large tegaderm or wound vac drape to prevent soilage d/t incontinence.    Apply gauze, abd pad, kerlix, tape to R foot / heel  and bandaid to R anterior ankle ulceration  Offloading: elevate for edema control, keep pressure off posterior calf/heel; offload sacrum/R ishium   Edema control: Tubigrip E   Change dressings daily    Follow-up: 1 week    Pickup Augmentin and Levaquin and take as prescribed.   Antibiotics may be changed if needed when culture results returned  Go to outpatient services today for blood work.   You will be notified of results when returned.     Standing Status:   Standing     Number of Occurrences:   4     Standing Expiration Date:   3/9/2023        Follow up in about 1 week (around 2/16/2023) for md visit .

## 2023-02-09 NOTE — PROCEDURES
"Wound tissue biopsy    Date/Time: 2/9/2023 12:52 PM  Performed by: Ronald Barcenas MD  Authorized by: Ronald Barcenas MD   Consent: Written consent obtained.  Risks and benefits: risks, benefits and alternatives were discussed  Consent given by: patient  Patient understanding: patient states understanding of the procedure being performed  Patient consent: the patient's understanding of the procedure matches consent given  Procedure consent: procedure consent matches procedure scheduled  Relevant documents: relevant documents present and verified  Test results: test results available and properly labeled  Site marked: the operative site was marked  Imaging studies: imaging studies not available  Patient identity confirmed: verbally with patient  Time out: Immediately prior to procedure a "time out" was called to verify the correct patient, procedure, equipment, support staff and site/side marked as required.  Preparation: Patient was prepped and draped in the usual sterile fashion.  Local anesthesia used: no    Anesthesia:  Local anesthesia used: no    Sedation:  Patient sedated: no    Patient tolerance: patient tolerated the procedure well with no immediate complications  Comments: Tissue cultures taken of right heel and right ischial tuberosity      "

## 2023-02-09 NOTE — TELEPHONE ENCOUNTER
Notified pt / Megan per telephone of normal WBC, however H&H low.  Instructed to call PCP regarding irregular lab.  Verbalized understanding.

## 2023-02-10 ENCOUNTER — HOSPITAL ENCOUNTER (EMERGENCY)
Facility: HOSPITAL | Age: 58
Discharge: HOME | End: 2023-02-10
Attending: STUDENT IN AN ORGANIZED HEALTH CARE EDUCATION/TRAINING PROGRAM
Payer: MEDICARE

## 2023-02-10 ENCOUNTER — TELEPHONE (OUTPATIENT)
Dept: WOUND CARE | Facility: HOSPITAL | Age: 58
End: 2023-02-10

## 2023-02-10 VITALS
OXYGEN SATURATION: 99 % | HEIGHT: 68 IN | DIASTOLIC BLOOD PRESSURE: 65 MMHG | TEMPERATURE: 98 F | HEART RATE: 122 BPM | RESPIRATION RATE: 18 BRPM | SYSTOLIC BLOOD PRESSURE: 95 MMHG | BODY MASS INDEX: 39.4 KG/M2 | WEIGHT: 260 LBS

## 2023-02-10 DIAGNOSIS — D64.9 ANEMIA, UNSPECIFIED TYPE: Primary | ICD-10-CM

## 2023-02-10 LAB
ABORH RETYPE: NORMAL
ANISOCYTOSIS BLD QL SMEAR: ABNORMAL
APTT PPP: 40.3 SECONDS (ref 23.2–33.7)
BASOPHILS # BLD AUTO: 0.01 X10(3)/MCL (ref 0–0.2)
BASOPHILS NFR BLD AUTO: 0.1 %
EOSINOPHIL # BLD AUTO: 0.19 X10(3)/MCL (ref 0–0.9)
EOSINOPHIL NFR BLD AUTO: 2.5 %
ERYTHROCYTE [DISTWIDTH] IN BLOOD BY AUTOMATED COUNT: 19.7 % (ref 11.5–17)
GROUP & RH: NORMAL
HCT VFR BLD AUTO: 27.2 % (ref 42–52)
HGB BLD-MCNC: 7.4 GM/DL (ref 14–18)
IMM GRANULOCYTES # BLD AUTO: 0.01 X10(3)/MCL (ref 0–0.04)
IMM GRANULOCYTES NFR BLD AUTO: 0.1 %
INDIRECT COOMBS GEL: NORMAL
INR BLD: 1.65 (ref 0–1.3)
IRON SATN MFR SERPL: 7 % (ref 20–50)
IRON SERPL-MCNC: 16 UG/DL (ref 65–175)
LYMPHOCYTES # BLD AUTO: 1.14 X10(3)/MCL (ref 0.6–4.6)
LYMPHOCYTES NFR BLD AUTO: 14.9 %
MCH RBC QN AUTO: 18 PG
MCHC RBC AUTO-ENTMCNC: 27.2 MG/DL (ref 33–36)
MCV RBC AUTO: 66.3 FL (ref 80–94)
MICROCYTES BLD QL SMEAR: ABNORMAL
MONOCYTES # BLD AUTO: 0.59 X10(3)/MCL (ref 0.1–1.3)
MONOCYTES NFR BLD AUTO: 7.7 %
NEUTROPHILS # BLD AUTO: 5.73 X10(3)/MCL (ref 2.1–9.2)
NEUTROPHILS NFR BLD AUTO: 74.7 %
PLATELET # BLD AUTO: 341 X10(3)/MCL (ref 130–400)
PLATELET # BLD EST: ADEQUATE 10*3/UL
PMV BLD AUTO: 9 FL (ref 7.4–10.4)
POLYCHROMASIA BLD QL SMEAR: ABNORMAL
PROTHROMBIN TIME: 16.3 SECONDS (ref 12.5–14.5)
RBC # BLD AUTO: 4.1 X10(6)/MCL (ref 4.7–6.1)
RBC MORPH BLD: ABNORMAL
TARGETS BLD QL SMEAR: ABNORMAL
TIBC SERPL-MCNC: 227 UG/DL (ref 69–240)
TIBC SERPL-MCNC: 243 UG/DL (ref 250–450)
TRANSFERRIN SERPL-MCNC: 224 MG/DL (ref 174–364)
WBC # SPEC AUTO: 7.7 X10(3)/MCL (ref 4.5–11.5)

## 2023-02-10 PROCEDURE — 85730 THROMBOPLASTIN TIME PARTIAL: CPT | Performed by: STUDENT IN AN ORGANIZED HEALTH CARE EDUCATION/TRAINING PROGRAM

## 2023-02-10 PROCEDURE — 85025 COMPLETE CBC W/AUTO DIFF WBC: CPT | Performed by: STUDENT IN AN ORGANIZED HEALTH CARE EDUCATION/TRAINING PROGRAM

## 2023-02-10 PROCEDURE — 85610 PROTHROMBIN TIME: CPT | Performed by: STUDENT IN AN ORGANIZED HEALTH CARE EDUCATION/TRAINING PROGRAM

## 2023-02-10 PROCEDURE — 82962 GLUCOSE BLOOD TEST: CPT

## 2023-02-10 PROCEDURE — 83550 IRON BINDING TEST: CPT | Performed by: STUDENT IN AN ORGANIZED HEALTH CARE EDUCATION/TRAINING PROGRAM

## 2023-02-10 PROCEDURE — 99283 EMERGENCY DEPT VISIT LOW MDM: CPT

## 2023-02-10 PROCEDURE — 86900 BLOOD TYPING SEROLOGIC ABO: CPT | Performed by: STUDENT IN AN ORGANIZED HEALTH CARE EDUCATION/TRAINING PROGRAM

## 2023-02-10 RX ORDER — FERROUS SULFATE 325(65) MG
325 TABLET, DELAYED RELEASE (ENTERIC COATED) ORAL DAILY
Qty: 30 TABLET | Refills: 1 | Status: SHIPPED | OUTPATIENT
Start: 2023-02-10 | End: 2023-03-12

## 2023-02-10 NOTE — ED PROVIDER NOTES
Encounter Date: 2/10/2023       History     Chief Complaint   Patient presents with    abnormal labs     Pt went to wound care clinic, had labs done, H&H hgb, 7.5, HCT 27.9, pt was told to report here to the ER for transfusion per his PCP roxann Fernando. Pt had surgical debredment x 2 weeks ago to buttocks, pt currently on xarelto, pt sees wound care for wounds on right buttocks and right heal      Patient is a 57-year-old white gentleman with a past medical history of spinal cord injury causing quadriplegia, diabetes, sacral wound, AFib on anticoagulation who presents the ER today due to abnormal hemoglobin levels at wound care.  Patient was noted to have a down trend in his hemoglobin and was presumed that was secondary to his bleeding sacral wound that occurs during dressing changes.  Patient is not on iron supplementation.  Patient was noted to have hemoglobin of 7.5 and hematocrit of 27.9.  Patient denies all PICA symptoms, cold intolerance that is new, increased lethargy.  Patient states that he has no melena or hematochezia.  Patient denies any chest pain, shortness of breath, abdominal pain.  Patient denies CHF history or coronary artery disease history which could change his infusion parameters.  Patient and wife are both reluctant on arrival to received blood at this time.    Review of patient's allergies indicates:  No Known Allergies  Past Medical History:   Diagnosis Date    A-fib     Cardiac arrest     7/2022    Chronic skin ulcer     DM (diabetes mellitus)     Infected decubitus ulcer     Lymphedema of leg     Neurogenic bladder     Obesity, unspecified     Pressure ulcer of heel     Pressure ulcer of right foot, stage 3     Pressure ulcer of right ischium     Quadriplegia     Quadriplegic spinal paralysis      No past surgical history on file.  No family history on file.  Social History     Tobacco Use    Smoking status: Never    Smokeless tobacco: Never   Substance Use Topics    Alcohol use: Never     Drug use: Never     Review of Systems   Constitutional:  Negative for chills, fatigue and fever.   HENT:  Negative for congestion, sore throat and trouble swallowing.    Eyes:  Negative for pain and visual disturbance.   Respiratory:  Negative for cough, shortness of breath and wheezing.    Cardiovascular:  Negative for chest pain and palpitations.   Gastrointestinal:  Negative for abdominal pain, blood in stool, constipation, diarrhea, nausea and vomiting.   Genitourinary:  Negative for dysuria and hematuria.   Musculoskeletal:  Negative for back pain and myalgias.   Skin:  Negative for rash and wound.   Neurological:  Negative for seizures, syncope and headaches.   Psychiatric/Behavioral:  Negative for confusion. The patient is not nervous/anxious.      Physical Exam     Initial Vitals   BP Pulse Resp Temp SpO2   02/10/23 1253 02/10/23 1253 02/10/23 1253 02/10/23 1253 02/10/23 1255   105/73 (!) 123 18 97.8 °F (36.6 °C) 97 %      MAP       --                Physical Exam    Nursing note and vitals reviewed.  Constitutional: He appears well-developed and well-nourished. No distress.   HENT:   Head: Normocephalic and atraumatic.   Eyes: Conjunctivae and EOM are normal. Right eye exhibits no discharge. Left eye exhibits no discharge. No scleral icterus.   Neck: No tracheal deviation present.   Normal range of motion.  Cardiovascular:  Normal rate, regular rhythm and normal heart sounds.     Exam reveals no gallop and no friction rub.       No murmur heard.  Pulmonary/Chest: Breath sounds normal. No respiratory distress. He has no wheezes. He has no rhonchi. He has no rales.   Musculoskeletal:         General: No edema.      Cervical back: Normal range of motion.      Comments: Quadriplegic.  Patient in wheelchair.     Neurological: He is alert.   Skin: Skin is warm and dry. No rash and no abscess noted. No erythema. No pallor.   Did not assess sacral wound as he seems to be following with Wound Care for this.  Patient  "did not want to be removed from his wheelchair if possible.   Psychiatric: His behavior is normal. Judgment normal.       ED Course   Procedures  Labs Reviewed   PROTIME-INR - Abnormal; Notable for the following components:       Result Value    PT 16.3 (*)     INR 1.65 (*)     All other components within normal limits   CBC WITH DIFFERENTIAL - Abnormal; Notable for the following components:    RBC 4.10 (*)     Hgb 7.4 (*)     Hct 27.2 (*)     MCV 66.3 (*)     MCHC 27.2 (*)     RDW 19.7 (*)     All other components within normal limits   APTT - Abnormal; Notable for the following components:    PTT 40.3 (*)     All other components within normal limits   IRON AND TIBC - Abnormal; Notable for the following components:    Iron Level 16 (*)     Iron Binding Capacity Total 243 (*)     Iron Saturation 7 (*)     All other components within normal limits   CBC W/ AUTO DIFFERENTIAL    Narrative:     The following orders were created for panel order CBC Auto Differential.  Procedure                               Abnormality         Status                     ---------                               -----------         ------                     CBC with Differential[[id number]]        Abnormal            Final result                 Please view results for these tests on the individual orders.   BLOOD SMEAR MICROSCOPIC EXAM ([facility])   TYPE & SCREEN          Imaging Results    None          Medications - No data to display  Medical Decision Making:   Initial Assessment:   No acute distress 57-year-old male  Differential Diagnosis:   Blood loss anemia, GI bleed, hematuria, symptomatic anemia  Clinical Tests:   Lab Tests: Ordered and Reviewed  ED Management:  Vital signs of patient's baseline per chart review   It appears patient is tachycardic chronically in the last visits this month at wound care all showed heart rate greater than 120   Family states that patient has a history of hypotension and when he is "kicked his blood " "pressure comes up. "  Hemoglobin 7.5 and hematocrit 27.9 which is notably decreased from previous   Patient is on anticoagulation but is asymptomatic   It appears patient has blood loss seems to come from a sacral wound doing dressing changes at least that is from the family  Patient is reluctant to get blood at this time and states that he does not feel that is necessary at this time because he "feels fine. "Patient does not have coronary artery disease or CHF this is parameters typically for transfusion or 7 and 21 however given patient's significant drop I did offer him blood transfusion   Patient states he would like to have blood count rechecked and if it is not a significant decrease he would like to try iron supplementation  Wife seems medically educated and seems to be reliable   Patient states he tries iron supplementation he would like to have hemoglobin rechecked next week on Monday to determine if blood is actually needed   Repeat hemoglobin of 7.4 and hematocrit seems relatively similar to previous and I conferred these results to the patient who states he would like iron supplementation going forward   I tried just wait this patient to receiving blood but he seems not amenable at this time   Ferrous sulfate 325 mg daily sent to pharmacy and as patient has a history of loose stools rather than constipation I think he will do fine without stool softeners   Wife states they will return if any symptoms do present and will follow-up with her PCP for repeat lab work in 2 days' time   Return precautions discussed and follow-up PCP recommended                        Clinical Impression:   Final diagnoses:  [D64.9] Anemia, unspecified type (Primary)        ED Disposition Condition    Discharge Stable          ED Prescriptions       Medication Sig Dispense Start Date End Date Auth. Provider    ferrous sulfate 325 (65 FE) MG EC tablet Take 1 tablet (325 mg total) by mouth once daily. 30 tablet 2/10/2023 3/12/2023 " Alberto Lambert MD          Follow-up Information       Follow up With Specialties Details Why Contact Info    [facility] - Emergency Dept Emergency Medicine  If symptoms worsen [address] [facility] [address] [phone]    Jennifer Fernando NP Family Medicine Schedule an appointment as soon as possible for a visit   [address] [place] [id number]  [phone]               Alberto Lambert MD  02/10/23 1527       Alberto Lambert MD  02/10/23 1529

## 2023-02-12 LAB — POCT GLUCOSE: 141 MG/DL (ref 70–110)

## 2023-02-13 ENCOUNTER — LAB VISIT (OUTPATIENT)
Dept: LAB | Facility: HOSPITAL | Age: 58
End: 2023-02-13
Attending: INTERNAL MEDICINE
Payer: MEDICARE

## 2023-02-13 ENCOUNTER — TELEPHONE (OUTPATIENT)
Dept: WOUND CARE | Facility: HOSPITAL | Age: 58
End: 2023-02-13
Payer: MEDICARE

## 2023-02-13 DIAGNOSIS — D50.9 IDA (IRON DEFICIENCY ANEMIA): Primary | ICD-10-CM

## 2023-02-13 LAB
ALBUMIN SERPL-MCNC: 2.7 G/DL (ref 3.5–5)
ALBUMIN/GLOB SERPL: 0.6 RATIO (ref 1.1–2)
ALP SERPL-CCNC: 131 UNIT/L (ref 40–150)
ALT SERPL-CCNC: 11 UNIT/L (ref 0–55)
AST SERPL-CCNC: 10 UNIT/L (ref 5–34)
BACTERIA SPEC CULT: ABNORMAL
BACTERIA SPEC CULT: ABNORMAL
BASOPHILS # BLD AUTO: 0.01 X10(3)/MCL (ref 0–0.2)
BASOPHILS NFR BLD AUTO: 0.1 %
BILIRUBIN DIRECT+TOT PNL SERPL-MCNC: 0.5 MG/DL
BUN SERPL-MCNC: 12.2 MG/DL (ref 8.4–25.7)
CALCIUM SERPL-MCNC: 8.8 MG/DL (ref 8.4–10.2)
CHLORIDE SERPL-SCNC: 103 MMOL/L (ref 98–107)
CO2 SERPL-SCNC: 24 MMOL/L (ref 22–29)
CREAT SERPL-MCNC: 0.62 MG/DL (ref 0.73–1.18)
EOSINOPHIL # BLD AUTO: 0.21 X10(3)/MCL (ref 0–0.9)
EOSINOPHIL NFR BLD AUTO: 2.4 %
ERYTHROCYTE [DISTWIDTH] IN BLOOD BY AUTOMATED COUNT: 19.8 % (ref 11.5–17)
FERRITIN SERPL-MCNC: 64.1 NG/ML (ref 21.81–274.66)
GFR SERPLBLD CREATININE-BSD FMLA CKD-EPI: >60 MLS/MIN/1.73/M2
GLOBULIN SER-MCNC: 4.3 GM/DL (ref 2.4–3.5)
GLUCOSE SERPL-MCNC: 155 MG/DL (ref 74–100)
HCT VFR BLD AUTO: 27.3 % (ref 42–52)
HGB BLD-MCNC: 7.3 GM/DL (ref 14–18)
IMM GRANULOCYTES # BLD AUTO: 0.02 X10(3)/MCL (ref 0–0.04)
IMM GRANULOCYTES NFR BLD AUTO: 0.2 %
IRON SATN MFR SERPL: 7 % (ref 20–50)
IRON SERPL-MCNC: 17 UG/DL (ref 65–175)
LYMPHOCYTES # BLD AUTO: 1.52 X10(3)/MCL (ref 0.6–4.6)
LYMPHOCYTES NFR BLD AUTO: 17.5 %
MCH RBC QN AUTO: 17.7 PG
MCHC RBC AUTO-ENTMCNC: 26.7 MG/DL (ref 33–36)
MCV RBC AUTO: 66.1 FL (ref 80–94)
MONOCYTES # BLD AUTO: 0.57 X10(3)/MCL (ref 0.1–1.3)
MONOCYTES NFR BLD AUTO: 6.5 %
NEUTROPHILS # BLD AUTO: 6.38 X10(3)/MCL (ref 2.1–9.2)
NEUTROPHILS NFR BLD AUTO: 73.3 %
PLATELET # BLD AUTO: 409 X10(3)/MCL (ref 130–400)
PMV BLD AUTO: 9.8 FL (ref 7.4–10.4)
POTASSIUM SERPL-SCNC: 3.8 MMOL/L (ref 3.5–5.1)
PROT SERPL-MCNC: 7 GM/DL (ref 6.4–8.3)
RBC # BLD AUTO: 4.13 X10(6)/MCL (ref 4.7–6.1)
SODIUM SERPL-SCNC: 138 MMOL/L (ref 136–145)
TIBC SERPL-MCNC: 218 UG/DL (ref 69–240)
TIBC SERPL-MCNC: 235 UG/DL (ref 250–450)
TRANSFERRIN SERPL-MCNC: 220 MG/DL (ref 174–364)
WBC # SPEC AUTO: 8.7 X10(3)/MCL (ref 4.5–11.5)

## 2023-02-13 PROCEDURE — 36415 COLL VENOUS BLD VENIPUNCTURE: CPT

## 2023-02-13 PROCEDURE — 80053 COMPREHEN METABOLIC PANEL: CPT

## 2023-02-13 PROCEDURE — 83550 IRON BINDING TEST: CPT

## 2023-02-13 PROCEDURE — 82728 ASSAY OF FERRITIN: CPT

## 2023-02-13 PROCEDURE — 85025 COMPLETE CBC W/AUTO DIFF WBC: CPT

## 2023-02-13 NOTE — TELEPHONE ENCOUNTER
Unable to reach patient by phone at this time.  Voicemail not set up.   Able to contact Megan per telephone and notify her that pt is currently on the correct antibiotics based on culture results. Verbalized understanding

## 2023-02-14 LAB
BACTERIA SPEC CULT: ABNORMAL
BACTERIA SPEC CULT: ABNORMAL

## 2023-02-16 ENCOUNTER — HOSPITAL ENCOUNTER (OUTPATIENT)
Dept: WOUND CARE | Facility: HOSPITAL | Age: 58
Discharge: HOME | End: 2023-02-16
Attending: PEDIATRICS
Payer: MEDICARE

## 2023-02-16 VITALS
SYSTOLIC BLOOD PRESSURE: 109 MMHG | DIASTOLIC BLOOD PRESSURE: 67 MMHG | TEMPERATURE: 98 F | RESPIRATION RATE: 18 BRPM | OXYGEN SATURATION: 100 % | HEART RATE: 96 BPM

## 2023-02-16 DIAGNOSIS — L89.610 PRESSURE INJURY OF RIGHT HEEL, UNSTAGEABLE: ICD-10-CM

## 2023-02-16 DIAGNOSIS — L89.310 PRESSURE INJURY OF RIGHT ISCHIUM, UNSTAGEABLE: ICD-10-CM

## 2023-02-16 PROCEDURE — 99215 PR OFFICE/OUTPT VISIT, EST, LEVL V, 40-54 MIN: ICD-10-PCS | Mod: ,,, | Performed by: FAMILY MEDICINE

## 2023-02-16 PROCEDURE — 99215 OFFICE O/P EST HI 40 MIN: CPT

## 2023-02-16 PROCEDURE — 99215 OFFICE O/P EST HI 40 MIN: CPT | Mod: ,,, | Performed by: FAMILY MEDICINE

## 2023-02-16 PROCEDURE — 99213 OFFICE O/P EST LOW 20 MIN: CPT

## 2023-02-16 NOTE — PATIENT INSTRUCTIONS
Cleanse wound with: Wound Cleanser  Periwound care: skin prep periwound , allow to dry well   Primary dressing: To R heel. / Right anterior ankle, Apply Aquacel or Opticell Ag.    Sacrum and ischium ulcers -  Apply Dakin's moistened gauze or rolled gauze  Secondary dressing: R ischium / Sacrum - cover with gauze, abd or other absorptive dressings to ischium and sacral wounds, secure with retention tape.   Once dressing secured with retention tape, cover dressing with large tegaderm or wound vac drape to prevent soilage d/t incontinence.    Apply gauze, abd pad, kerlix, tape to R foot / heel  and bandaid to R anterior ankle ulceration  Offloading: elevate for edema control, keep pressure off posterior calf/heel; offload sacrum/R ishium   Edema control: Tubigrip E   Change dressings daily     Follow-up: 1 week     Continue Augmentin and Levaquin and take as prescribed.   Culture results for heel will be sent to [facility] for topical compound.  If topical compound arrives prior to next visit, may use it on the heel instead of the Aquacel / Opticell and wrap as normal.      Make a follow up appointment with the General Surgeon for follow up on Ischial ulceration.(At [facility] in [place])  Follow up with PCP regarding blood work results

## 2023-02-16 NOTE — PROGRESS NOTES
Subjective:       Patient ID: Antonio Galvan II is a 57 y.o. male.    Chief Complaint: Pressure Ulcer (Pressure ulcers to R heel, R ischium and sacrum. MD visit and re-assessment )    HPI  Review of Systems      Objective:      Temp:  [98.2 °F (36.8 °C)]   Pulse:  [96]   Resp:  [18]   BP: (109)/(67)   SpO2:  [100 %]   Physical Exam       Altered Skin Integrity 05/06/22 1140 Right Heel  Full thickness tissue loss. Subcutaneous fat may be visible but bone, tendon or muscle are not exposed (Active)   05/06/22 1140   Altered Skin Integrity Present on Admission: yes   Side: Right   Orientation:    Location: Heel   Wound Number:    Is this injury device related?: No   Primary Wound Type:    Description of Altered Skin Integrity: Full thickness tissue loss. Subcutaneous fat may be visible but bone, tendon or muscle are not exposed   Ankle-Brachial Index:    Pulses:    Removal Indication and Assessment:    Wound Outcome:    (Retired) Wound Length (cm):    (Retired) Wound Width (cm):    (Retired) Depth (cm):    Wound Description (Comments):    Removal Indications:    Wound Image   02/16/23 1135   Description of Altered Skin Integrity Full thickness tissue loss with exposed bone, tendon, or muscle. Often includes undermining and tunneling. May extend into muscle and/or supporting structures. 02/16/23 1135   Dressing Appearance Intact;Moist drainage 02/16/23 1135   Drainage Amount Moderate 02/16/23 1135   Drainage Characteristics/Odor Serosanguineous 02/16/23 1135   Appearance Pink;Red;Necrotic;Granulating;Black 02/16/23 1135   Tissue loss description Full thickness 02/16/23 1135   Black (%), Wound Tissue Color 15 % 02/16/23 1135   Red (%), Wound Tissue Color 85 % 02/16/23 1135   Periwound Area Scar tissue 02/16/23 1135   Wound Edges Defined 02/16/23 1135   Wound Length (cm) 6.3 cm 02/16/23 1135   Wound Width (cm) 5.5 cm 02/16/23 1135   Wound Depth (cm) 0.1 cm 02/16/23 1135   Wound Volume (cm^3) 3.465 cm^3 02/16/23 1135    Wound Surface Area (cm^2) 34.65 cm^2 02/16/23 1135   Care Cleansed with:;Sterile normal saline 02/16/23 1135   Dressing Applied;Hydrofiber;Silver;Gauze;Absorptive Pad;Rolled gauze 02/16/23 1135   Periwound Care Skin barrier film applied 02/16/23 1135   Compression Tubular elasticized bandage 02/16/23 1135   Off Loading Other (see comments) 02/16/23 1135            Altered Skin Integrity 01/12/23 1530 Sacral spine Full thickness tissue loss with exposed bone, tendon, or muscle. Often includes undermining and tunneling. May extend into muscle and/or supporting structures. (Active)   01/12/23 1530   Altered Skin Integrity Present on Admission:    Side:    Orientation:    Location: Sacral spine   Wound Number:    Is this injury device related?:    Primary Wound Type:    Description of Altered Skin Integrity: Full thickness tissue loss with exposed bone, tendon, or muscle. Often includes undermining and tunneling. May extend into muscle and/or supporting structures.   Ankle-Brachial Index:    Pulses:    Removal Indication and Assessment:    Wound Outcome:    (Retired) Wound Length (cm):    (Retired) Wound Width (cm):    (Retired) Depth (cm):    Wound Description (Comments):    Removal Indications:    Wound Image   02/16/23 1135   Description of Altered Skin Integrity Full thickness tissue loss with exposed bone, tendon, or muscle. Often includes undermining and tunneling. May extend into muscle and/or supporting structures. 02/16/23 1135   Dressing Appearance Intact;Moist drainage 02/16/23 1135   Drainage Amount Large 02/16/23 1135   Drainage Characteristics/Odor Serosanguineous 02/16/23 1135   Appearance Red;Slough;Ecchymotic;Granulating 02/16/23 1135   Tissue loss description Full thickness 02/16/23 1135   Periwound Area Macerated 02/16/23 1135   Wound Edges Callused 02/16/23 1135   Wound Length (cm) 13 cm 02/16/23 1135   Wound Width (cm) 3 cm 02/16/23 1135   Wound Depth (cm) 0.1 cm 02/16/23 1135   Wound Volume  (cm^3) 3.9 cm^3 02/16/23 1135   Wound Surface Area (cm^2) 39 cm^2 02/16/23 1135   Care Cleansed with:;Wound cleanser 02/16/23 1135   Dressing Applied;Gauze;Absorptive Pad;Other (comment) 02/16/23 1135   Periwound Care Skin barrier film applied 02/16/23 1135            Altered Skin Integrity 01/12/23 1532 Right Ischial tuberosity Full thickness tissue loss with exposed bone, tendon, or muscle. Often includes undermining and tunneling. May extend into muscle and/or supporting structures. (Active)   01/12/23 1532   Altered Skin Integrity Present on Admission:    Side: Right   Orientation:    Location: Ischial tuberosity   Wound Number:    Is this injury device related?:    Primary Wound Type:    Description of Altered Skin Integrity: Full thickness tissue loss with exposed bone, tendon, or muscle. Often includes undermining and tunneling. May extend into muscle and/or supporting structures.   Ankle-Brachial Index:    Pulses:    Removal Indication and Assessment:    Wound Outcome:    (Retired) Wound Length (cm):    (Retired) Wound Width (cm):    (Retired) Depth (cm):    Wound Description (Comments):    Removal Indications:    Wound Image   02/16/23 1135   Dressing Appearance Intact;Moist drainage 02/16/23 1135   Drainage Amount Large 02/16/23 1135   Drainage Characteristics/Odor Serosanguineous 02/16/23 1135   Appearance Red;Granulating;Pink;Tan;Yellow;Eschar;Black 02/16/23 1135   Tissue loss description Full thickness 02/16/23 1135   Black (%), Wound Tissue Color 50 % 02/16/23 1135   Red (%), Wound Tissue Color 20 % 02/16/23 1135   Yellow (%), Wound Tissue Color 30 % 02/16/23 1135   Periwound Area Denuded;Macerated 02/16/23 1135   Wound Edges Defined 02/16/23 1135   Wound Length (cm) 14.5 cm 02/16/23 1135   Wound Width (cm) 13.1 cm 02/16/23 1135   Wound Depth (cm) 3.1 cm 02/16/23 1135   Wound Volume (cm^3) 588.845 cm^3 02/16/23 1135   Wound Surface Area (cm^2) 189.95 cm^2 02/16/23 1135   Care Cleansed with:;Wound  cleanser 02/16/23 1135   Dressing Applied;Gauze;Absorptive Pad 02/16/23 1135   Periwound Care Skin barrier film applied 02/16/23 1135            Altered Skin Integrity 02/09/23 1324 Right anterior Ankle Ulceration Full thickness tissue loss. Subcutaneous fat may be visible but bone, tendon or muscle are not exposed (Active)   02/09/23 1324   Altered Skin Integrity Present on Admission: yes   Side: Right   Orientation: anterior   Location: Ankle   Wound Number:    Is this injury device related?:    Primary Wound Type: Ulceration   Description of Altered Skin Integrity: Full thickness tissue loss. Subcutaneous fat may be visible but bone, tendon or muscle are not exposed   Ankle-Brachial Index:    Pulses:    Removal Indication and Assessment:    Wound Outcome:    (Retired) Wound Length (cm):    (Retired) Wound Width (cm):    (Retired) Depth (cm):    Wound Description (Comments):    Removal Indications:    Wound Image   02/16/23 1135   Description of Altered Skin Integrity Full thickness tissue loss. Subcutaneous fat may be visible but bone, tendon or muscle are not exposed 02/16/23 1135   Dressing Appearance Intact;Moist drainage 02/16/23 1135   Drainage Amount Small 02/16/23 1135   Drainage Characteristics/Odor Serosanguineous 02/16/23 1135   Appearance Pink;Red;Not granulating 02/16/23 1135   Tissue loss description Full thickness 02/16/23 1135   Periwound Area Intact 02/16/23 1135   Wound Edges Defined 02/16/23 1135   Wound Length (cm) 0.9 cm 02/16/23 1135   Wound Width (cm) 0.4 cm 02/16/23 1135   Wound Depth (cm) 0.1 cm 02/16/23 1135   Wound Volume (cm^3) 0.036 cm^3 02/16/23 1135   Wound Surface Area (cm^2) 0.36 cm^2 02/16/23 1135   Care Cleansed with:;Sterile normal saline 02/16/23 1135   Dressing Applied;Hydrofiber;Silver;Gauze 02/16/23 1135         Assessment:     Pt examined and notes review above. R heel wound is + P.Aeruginosa/Proteus, send to the compound pharmacy. R ISCHIUM/sacrum area wound, with a lot of  necrotic tissue culture + Proteus/ acinobacter B. The pt is in levaquin and augmentin for her sacrum wound. His H/H is low and he is f/u by his PCP. . Apply Oplicell/gauze/abd/kerlix to his R heel wound. Apply dakins moistened gauze/dry gauze/abd/tape daily. RTC IN 1 WEEK. We are sending the cultures for R heel to [facility].    ICD-10-CM ICD-9-CM   1. Pressure injury of right heel, unstageable  L89.610 707.07     707.25   2. Pressure injury of right ischium, unstageable  L89.310 707.05     707.25         Plan:   Tissue pathology and/or culture taken:  [] Yes [x] No   Sharp debridement performed:   [] Yes [x] No   Labs ordered this visit:   [] Yes [x] No   Imaging ordered this visit:   [] Yes [x] No           Orders Placed This Encounter   Procedures    Change dressing     Cleanse wound with: Wound Cleanser  Periwound care: skin prep periwound , allow to dry well   Primary dressing: To R heel. / Right anterior ankle, Apply Aquacel or Opticell Ag.    Sacrum and ischium ulcers -  Apply Dakin's moistened gauze or rolled gauze  Secondary dressing: R ischium / Sacrum - cover with gauze, abd or other absorptive dressings to ischium and sacral wounds, secure with retention tape.   Once dressing secured with retention tape, cover dressing with large tegaderm or wound vac drape to prevent soilage d/t incontinence.    Apply gauze, abd pad, kerlix, tape to R foot / heel  and bandaid to R anterior ankle ulceration  Offloading: elevate for edema control, keep pressure off posterior calf/heel; offload sacrum/R ishium   Edema control: Tubigrip E   Change dressings daily  Other orders: Your culture results for your R heel will be sent to [facility]. Once received, may discontinue Opticell to heel and apply topical antibiotic compound as directed and cover dressing     Follow-up: 1 week     Standing Status:   Standing     Number of Occurrences:   4     Standing Expiration Date:   3/16/2023        Follow up in  about 1 week (around 2/23/2023) for MD Visit .

## 2023-03-23 ENCOUNTER — HOSPITAL ENCOUNTER (OUTPATIENT)
Dept: WOUND CARE | Facility: HOSPITAL | Age: 58
Discharge: HOME | End: 2023-03-23
Attending: NURSE PRACTITIONER
Payer: MEDICARE

## 2023-03-23 VITALS
DIASTOLIC BLOOD PRESSURE: 59 MMHG | HEART RATE: 97 BPM | RESPIRATION RATE: 18 BRPM | OXYGEN SATURATION: 99 % | TEMPERATURE: 99 F | SYSTOLIC BLOOD PRESSURE: 102 MMHG

## 2023-03-23 DIAGNOSIS — S71.002A OPEN WOUND OF LEFT HIP, INITIAL ENCOUNTER: Primary | ICD-10-CM

## 2023-03-23 DIAGNOSIS — L89.310 PRESSURE INJURY OF RIGHT ISCHIUM, UNSTAGEABLE: ICD-10-CM

## 2023-03-23 DIAGNOSIS — L89.610 PRESSURE INJURY OF RIGHT HEEL, UNSTAGEABLE: ICD-10-CM

## 2023-03-23 PROCEDURE — 99215 OFFICE O/P EST HI 40 MIN: CPT | Mod: 25,,, | Performed by: FAMILY MEDICINE

## 2023-03-23 PROCEDURE — 11045 DBRDMT SUBQ TISS EACH ADDL: CPT | Mod: ,,, | Performed by: FAMILY MEDICINE

## 2023-03-23 PROCEDURE — 11042 DBRDMT SUBQ TIS 1ST 20SQCM/<: CPT

## 2023-03-23 PROCEDURE — 11045 DBRDMT SUBQ TISS EACH ADDL: CPT

## 2023-03-23 PROCEDURE — 11045 DEBRIDEMENT: ICD-10-PCS | Mod: ,,, | Performed by: FAMILY MEDICINE

## 2023-03-23 PROCEDURE — 99215 OFFICE O/P EST HI 40 MIN: CPT | Mod: 25

## 2023-03-23 PROCEDURE — 11042 DEBRIDEMENT: ICD-10-PCS | Mod: ,,, | Performed by: FAMILY MEDICINE

## 2023-03-23 PROCEDURE — 11042 DBRDMT SUBQ TIS 1ST 20SQCM/<: CPT | Mod: ,,, | Performed by: FAMILY MEDICINE

## 2023-03-23 PROCEDURE — 99215 PR OFFICE/OUTPT VISIT, EST, LEVL V, 40-54 MIN: ICD-10-PCS | Mod: 25,,, | Performed by: FAMILY MEDICINE

## 2023-03-23 PROCEDURE — 87077 CULTURE AEROBIC IDENTIFY: CPT | Mod: 91

## 2023-03-23 RX ORDER — METRONIDAZOLE 500 MG/1
500 TABLET ORAL SEE ADMIN INSTRUCTIONS
Qty: 30 TABLET | Refills: 1 | Status: ON HOLD | OUTPATIENT
Start: 2023-03-23 | End: 2023-05-25

## 2023-03-23 RX ORDER — METRONIDAZOLE 500 MG/1
TABLET ORAL
Status: DISCONTINUED
Start: 2023-03-23 | End: 2023-03-24 | Stop reason: HOSPADM

## 2023-03-23 NOTE — PROCEDURES
"Debridement    Date/Time: 3/23/2023 3:00 PM  Performed by: Ren Weinberg MD  Authorized by: Ren Weinberg MD     Time out: Immediately prior to procedure a "time out" was called to verify the correct patient, procedure, equipment, support staff and site/side marked as required.    Consent Done?:  Yes (Written)  Local anesthesia used?: No      Wound Details:    Location:  Left hip    Type of Debridement:  Excisional       Length (cm):  6.5       Area (sq cm):  29.9       Width (cm):  4.6       Percent Debrided (%):  100       Depth (cm):  1.8       Total Area Debrided (sq cm):  29.9    Depth of debridement:  Subcutaneous tissue    Tissue debrided:  Subcutaneous and Adipose    Devitalized tissue debrided:  Necrotic/Eschar, Slough, Exudate, Fibrin and Biofilm    Instruments:  Curette, Scissors and Blade  Bleeding:  Minimal  Hemostasis Achieved: Yes  Method Used:  Pressure  Patient tolerance:  Patient tolerated the procedure well with no immediate complications  "

## 2023-03-23 NOTE — PATIENT INSTRUCTIONS
Cleanse wound with: NS, soap and water, or wound cleanser  Periwound care: Skin prep periwounds; Hydrocolloid barrier to R ischium periwound   Primary dressing: Collagen Ag to R heel and R anterior ankle, and coccyx. Medihoney and Mesalt to L trochanter   Medihoney and Black Granufoam to R ischium.   Secondary dressing: Gauze, abd pad, kerlix, secure with tape to R heel/R ankle. Gauze, abd, secure with retention tape to L trochanter. Transparent film to R ischium. Gauze, abd pad, secure with tape to coccyx   Offloading: Offload as much as possible   Edema control: Tubigrip E to RLE  Frequency: QOD to RLE/Coccyx. Daily to L trochanter. Wound vac changes only in wound care center.   Follow-up: MD Visit on Tuesday   Other Orders: NPWT @ -125mmHg to R ischium.   Apply crushed Metronidazole to R ischium wound bed base prior to Wound vac application.     Discharge Instructions regarding Wound Vac      Wound Vac: You should have appointments scheduled 2-3 times a week to provide wound care. During these appointments, your Wound Vac will be changed. Please bring the supplies that were provided with the Wound Vac to these appointments.     Should your Wound Vac begin to alarm, or you begin to have problems related to the Wound Vac itself, the Wound Vac company has 24-hour availability. [facility] is able to provide troubleshooting support for all [employer].® Therapy devices:      Helps provide on-the-spot problem identification and resolution or deals with service requests      Convenient telephone access for all care situations     To access this service, please contact [facility] via [phone].     If unable to troubleshoot or reach [facility] after 2 hours, remove dressing and apply wet to dry dressing over wound bed.   Follow up as scheduled in Wound Care Clinic. If you have any additional questions, contact us at [phone]

## 2023-03-23 NOTE — PROGRESS NOTES
Subjective:       Patient ID: Antonio Galvan II is a 57 y.o. male.    Chief Complaint: Pressure Ulcer (Pressure ulcers to R heel, sacrum, and R ishium MD visit and re-assessment. Pt was being seen at wound care in [place] where debridements performed. C/o new pressure ulcer to L hip. NPWT initiated Monday 3/20.)    HPI  Review of Systems      Objective:      Temp:  [99.2 °F (37.3 °C)]   Pulse:  [97]   Resp:  [18]   BP: (102)/(59)   SpO2:  [99 %]   Physical Exam       Altered Skin Integrity 05/06/22 1140 Right Heel  Full thickness tissue loss. Subcutaneous fat may be visible but bone, tendon or muscle are not exposed (Active)   05/06/22 1140   Altered Skin Integrity Present on Admission - Did Patient arrive to the hospital with altered skin?: yes   Side: Right   Orientation:    Location: Heel   Wound Number:    Is this injury device related?: No   Primary Wound Type:    Description of Altered Skin Integrity: Full thickness tissue loss. Subcutaneous fat may be visible but bone, tendon or muscle are not exposed   Ankle-Brachial Index:    Pulses:    Removal Indication and Assessment:    Wound Outcome:    (Retired) Wound Length (cm):    (Retired) Wound Width (cm):    (Retired) Depth (cm):    Wound Description (Comments):    Removal Indications:    Wound Image   03/23/23 1521   Dressing Appearance Intact;Moist drainage 03/23/23 1521   Drainage Amount Moderate 03/23/23 1521   Drainage Characteristics/Odor Serosanguineous 03/23/23 1521   Appearance Red;Tan;Granulating;Ecchymotic 03/23/23 1521   Tissue loss description Full thickness 03/23/23 1521   Black (%), Wound Tissue Color 10 % 03/23/23 1521   Red (%), Wound Tissue Color 90 % 03/23/23 1521   Periwound Area Scar tissue 03/23/23 1521   Wound Edges Defined 03/23/23 1521   Wound Length (cm) 6.8 cm 03/23/23 1521   Wound Width (cm) 4 cm 03/23/23 1521   Wound Depth (cm) 0.1 cm 03/23/23 1521   Wound Volume (cm^3) 2.72 cm^3 03/23/23 1521   Wound Surface Area (cm^2) 27.2  cm^2 03/23/23 1521   Care Cleansed with:;Antimicrobial agent;Sterile normal saline 03/23/23 1521   Dressing Applied;Collagen;Silver;Gauze;Absorptive Pad;Rolled gauze 03/23/23 1521   Periwound Care Skin barrier film applied 03/23/23 1521   Compression Tubular elasticized bandage 03/23/23 1521            Altered Skin Integrity 01/12/23 1530 Sacral spine Full thickness tissue loss with exposed bone, tendon, or muscle. Often includes undermining and tunneling. May extend into muscle and/or supporting structures. (Active)   01/12/23 1530   Altered Skin Integrity Present on Admission - Did Patient arrive to the hospital with altered skin?:    Side:    Orientation:    Location: Sacral spine   Wound Number:    Is this injury device related?:    Primary Wound Type:    Description of Altered Skin Integrity: Full thickness tissue loss with exposed bone, tendon, or muscle. Often includes undermining and tunneling. May extend into muscle and/or supporting structures.   Ankle-Brachial Index:    Pulses:    Removal Indication and Assessment:    Wound Outcome:    (Retired) Wound Length (cm):    (Retired) Wound Width (cm):    (Retired) Depth (cm):    Wound Description (Comments):    Removal Indications:    Wound Image   03/23/23 1551   Description of Altered Skin Integrity Full thickness tissue loss with exposed bone, tendon, or muscle. Often includes undermining and tunneling. May extend into muscle and/or supporting structures. 03/23/23 1551   Dressing Appearance Intact;Moist drainage 03/23/23 1551   Drainage Amount Moderate 03/23/23 1551   Drainage Characteristics/Odor Serosanguineous 03/23/23 1551   Appearance Fibrin;Tan 03/23/23 1551   Tissue loss description Full thickness 03/23/23 1551   Periwound Area Macerated 03/23/23 1551   Wound Edges Callused 03/23/23 1551   Wound Length (cm) 3.5 cm 03/23/23 1551   Wound Width (cm) 2.3 cm 03/23/23 1551   Wound Depth (cm) 0.8 cm 03/23/23 1551   Wound Volume (cm^3) 6.44 cm^3 03/23/23 1551    Wound Surface Area (cm^2) 8.05 cm^2 03/23/23 1551   Care Cleansed with:;Sterile normal saline 03/23/23 1551   Dressing Applied;Collagen;Gauze;Absorptive Pad 03/23/23 1551   Periwound Care Skin barrier film applied 03/23/23 1551            Altered Skin Integrity 01/12/23 1532 Right Ischial tuberosity Full thickness tissue loss with exposed bone, tendon, or muscle. Often includes undermining and tunneling. May extend into muscle and/or supporting structures. (Active)   01/12/23 1532   Altered Skin Integrity Present on Admission - Did Patient arrive to the hospital with altered skin?:    Side: Right   Orientation:    Location: Ischial tuberosity   Wound Number:    Is this injury device related?:    Primary Wound Type:    Description of Altered Skin Integrity: Full thickness tissue loss with exposed bone, tendon, or muscle. Often includes undermining and tunneling. May extend into muscle and/or supporting structures.   Ankle-Brachial Index:    Pulses:    Removal Indication and Assessment:    Wound Outcome:    (Retired) Wound Length (cm):    (Retired) Wound Width (cm):    (Retired) Depth (cm):    Wound Description (Comments):    Removal Indications:    Wound Image    03/23/23 1551   Dressing Appearance Intact;Moist drainage 03/23/23 1551   Drainage Amount Large 03/23/23 1551   Drainage Characteristics/Odor Serosanguineous 03/23/23 1551   Appearance Black;Eschar;Slough;Granulating;Tan;Bone;Pink;Red 03/23/23 1551   Tissue loss description Full thickness 03/23/23 1551   Black (%), Wound Tissue Color 75 % 03/23/23 1551   Red (%), Wound Tissue Color 25 % 03/23/23 1551   Periwound Area Scar tissue 03/23/23 1551   Wound Edges Defined 03/23/23 1551   Wound Length (cm) 16.5 cm 03/23/23 1551   Wound Width (cm) 15.3 cm 03/23/23 1551   Wound Depth (cm) 4.8 cm 03/23/23 1551   Wound Volume (cm^3) 1211.76 cm^3 03/23/23 1551   Wound Surface Area (cm^2) 252.45 cm^2 03/23/23 1551   Care Cleansed with:;Sterile normal saline 03/23/23 1559    Dressing Applied;Honey;Other (comment);Transparent film 03/23/23 1551   Periwound Care Skin barrier film applied 03/23/23 1551            Altered Skin Integrity 02/09/23 1324 Right anterior Ankle Ulceration Full thickness tissue loss. Subcutaneous fat may be visible but bone, tendon or muscle are not exposed (Active)   02/09/23 1324   Altered Skin Integrity Present on Admission - Did Patient arrive to the hospital with altered skin?: yes   Side: Right   Orientation: anterior   Location: Ankle   Wound Number:    Is this injury device related?:    Primary Wound Type: Ulceration   Description of Altered Skin Integrity: Full thickness tissue loss. Subcutaneous fat may be visible but bone, tendon or muscle are not exposed   Ankle-Brachial Index:    Pulses:    Removal Indication and Assessment:    Wound Outcome:    (Retired) Wound Length (cm):    (Retired) Wound Width (cm):    (Retired) Depth (cm):    Wound Description (Comments):    Removal Indications:    Wound Image   03/23/23 1521   Description of Altered Skin Integrity Full thickness tissue loss. Subcutaneous fat may be visible but bone, tendon or muscle are not exposed 03/23/23 1521   Dressing Appearance Intact;Moist drainage 03/23/23 1521   Drainage Amount Small 03/23/23 1521   Drainage Characteristics/Odor Serosanguineous;No odor 03/23/23 1521   Appearance Red;Granulating 03/23/23 1521   Tissue loss description Full thickness 03/23/23 1521   Red (%), Wound Tissue Color 100 % 03/23/23 1521   Periwound Area Intact 03/23/23 1521   Wound Edges Defined 03/23/23 1521   Wound Length (cm) 0.6 cm 03/23/23 1521   Wound Width (cm) 1 cm 03/23/23 1521   Wound Depth (cm) 0.1 cm 03/23/23 1521   Wound Volume (cm^3) 0.06 cm^3 03/23/23 1521   Wound Surface Area (cm^2) 0.6 cm^2 03/23/23 1521   Care Cleansed with:;Antimicrobial agent;Sterile normal saline 03/23/23 1521   Dressing Applied;Collagen;Silver;Gauze;Rolled gauze 03/23/23 1521   Compression Tubular elasticized bandage  03/23/23 1521            Altered Skin Integrity Left Greater trochanter Full thickness tissue loss. Base is covered by slough and/or eschar in the wound bed (Active)       Altered Skin Integrity Present on Admission - Did Patient arrive to the hospital with altered skin?:    Side: Left   Orientation:    Location: Greater trochanter   Wound Number:    Is this injury device related?:    Primary Wound Type:    Description of Altered Skin Integrity: Full thickness tissue loss. Base is covered by slough and/or eschar in the wound bed   Ankle-Brachial Index:    Pulses:    Removal Indication and Assessment:    Wound Outcome:    (Retired) Wound Length (cm):    (Retired) Wound Width (cm):    (Retired) Depth (cm):    Wound Description (Comments):    Removal Indications:    Wound Image    03/23/23 1551   Description of Altered Skin Integrity Full thickness tissue loss. Base is covered by slough and/or eschar in the wound bed 03/23/23 1551   Dressing Appearance Intact;Moist drainage 03/23/23 1551   Drainage Amount Moderate 03/23/23 1551   Drainage Characteristics/Odor Purulent;Malodorous 03/23/23 1551   Appearance Black;Tan;Slough;Adipose;Pink;Not granulating 03/23/23 1551   Tissue loss description Full thickness 03/23/23 1551   Periwound Area Macerated 03/23/23 1551   Wound Edges Defined 03/23/23 1551   Wound Length (cm) 6.4 cm 03/23/23 1551   Wound Width (cm) 4.4 cm 03/23/23 1551   Wound Depth (cm) 1.6 cm 03/23/23 1551   Wound Volume (cm^3) 45.056 cm^3 03/23/23 1551   Wound Surface Area (cm^2) 28.16 cm^2 03/23/23 1551   Care Cleansed with:;Sterile normal saline;Debrided 03/23/23 1551   Dressing Applied;Honey;Sodium chloride impregnated;Gauze;Absorptive Pad 03/23/23 1551   Periwound Care Skin barrier film applied 03/23/23 1551         Assessment:    Pt examined and notes review above. The pt is having dressing changes in [facility]"" wound clinic, now he is transfer here for wound changes and wound vac. The pt have complicated  wounds. I really recommend for him to be in LTAC facility for the care of all his wounds. . The pt have a R hip wound with almost 75% of necrotic tissue, odor+ and wound vac. hIS Left hip have necrotic tissue that was debrided using a blade/scissors and curette, also a tissue culture was done from the left hip. The patient have 2 more wounds in the R heel and coccyx area. Apply collagen to R heel/coccyx area. Apply medihoney/mesalt to Left hip and R hip open wounds. Apply wound vac to his R hip wound and flagyl was added to help with the odor. The pt is coming back to see Dr Jean on Tuesday for f/u.     ICD-10-CM ICD-9-CM   1. Open wound of left hip, initial encounter  S71.002A 890.0   2. Pressure injury of right ischium, unstageable  L89.310 707.05     707.25   3. Pressure injury of right heel, unstageable  L89.610 707.07     707.25         Plan:   Tissue pathology and/or culture taken:  [x] Yes [] No   Sharp debridement performed:   [x] Yes [] No   Labs ordered this visit:   [] Yes [x] No   Imaging ordered this visit:   [] Yes [x] No           Orders Placed This Encounter   Procedures    Debridement     This order was created via procedure documentation     Standing Status:   Standing     Number of Occurrences:   1    Tissue Culture - Aerobic    Change dressing     Cleanse wound with: NS, soap and water, or wound cleanser  Periwound care: Skin prep periwounds; Hydrocolloid barrier to R ischium periwound   Primary dressing: Collagen Ag to R heel and R anterior ankle. Medihoney and Mesalt to L trochanter   Medihoney and Black Granufoam to R ischium. Collagen Ag to coccyx   Secondary dressing: Gauze, abd pad, kerlix, secure with tape to R heel/R ankle. Gauze, abd, secure with retention tape to L trochanter. Transparent film to R ischium. Gauze, abd pad, secure with tape to coccyx   Offloading: Offload as much as possible   Edema control: Tubigrip E to RLE  Frequency: QOD to RLE. Daily to L  trochanter  Follow-up: MD Visit on Tuesday   Other Orders: NPWT @ -125mmHg to R ischium.   Apply crushed Metronidazole to R ischium wound bed base prior to Wound vac application.        Follow up in about 1 week (around 3/30/2023) for MD Visit .

## 2023-03-28 ENCOUNTER — HOSPITAL ENCOUNTER (OUTPATIENT)
Dept: WOUND CARE | Facility: HOSPITAL | Age: 58
Discharge: HOME | End: 2023-03-28
Attending: NURSE PRACTITIONER
Payer: MEDICARE

## 2023-03-28 VITALS
RESPIRATION RATE: 18 BRPM | DIASTOLIC BLOOD PRESSURE: 88 MMHG | TEMPERATURE: 98 F | OXYGEN SATURATION: 99 % | HEART RATE: 121 BPM | SYSTOLIC BLOOD PRESSURE: 148 MMHG

## 2023-03-28 DIAGNOSIS — L89.513 PRESSURE INJURY OF RIGHT ANKLE, STAGE 3: ICD-10-CM

## 2023-03-28 DIAGNOSIS — L89.613 PRESSURE INJURY OF RIGHT HEEL, STAGE 3: ICD-10-CM

## 2023-03-28 DIAGNOSIS — L89.314 PRESSURE ULCER OF ISCHIUM, RIGHT, STAGE IV: ICD-10-CM

## 2023-03-28 DIAGNOSIS — L89.310 PRESSURE INJURY OF RIGHT ISCHIUM, UNSTAGEABLE: ICD-10-CM

## 2023-03-28 DIAGNOSIS — L89.314 PRESSURE INJURY OF RIGHT ISCHIUM, STAGE 4: Primary | ICD-10-CM

## 2023-03-28 DIAGNOSIS — G82.50 QUADRIPLEGIA: ICD-10-CM

## 2023-03-28 DIAGNOSIS — L89.224 PRESSURE INJURY OF TROCHANTERIC REGION OF LEFT HIP, STAGE 4: ICD-10-CM

## 2023-03-28 DIAGNOSIS — S71.002A OPEN WOUND OF LEFT HIP, INITIAL ENCOUNTER: Primary | ICD-10-CM

## 2023-03-28 DIAGNOSIS — L89.44 PRESSURE INJURY OF CONTIGUOUS REGION INVOLVING BACK AND BUTTOCK, STAGE 4, UNSPECIFIED LATERALITY: ICD-10-CM

## 2023-03-28 LAB
BACTERIA SPEC CULT: ABNORMAL

## 2023-03-28 PROCEDURE — 97598 DBRDMT OPN WND ADDL 20CM/<: CPT | Mod: ,,, | Performed by: PEDIATRICS

## 2023-03-28 PROCEDURE — 97598 PR DEBRIDEMENT OPEN WOUND EA ADDL 20 SQ CM: ICD-10-PCS | Mod: ,,, | Performed by: PEDIATRICS

## 2023-03-28 PROCEDURE — 97597 DBRDMT OPN WND 1ST 20 CM/<: CPT

## 2023-03-28 PROCEDURE — 97606 NEG PRS WND THER DME>50 SQCM: CPT

## 2023-03-28 PROCEDURE — 99213 OFFICE O/P EST LOW 20 MIN: CPT

## 2023-03-28 PROCEDURE — 97598 DBRDMT OPN WND ADDL 20CM/<: CPT

## 2023-03-28 PROCEDURE — 97597 PR DEBRIDEMENT OPEN WOUND 20 SQ CM<: ICD-10-PCS | Mod: ,,, | Performed by: PEDIATRICS

## 2023-03-28 PROCEDURE — 99213 PR OFFICE/OUTPT VISIT, EST, LEVL III, 20-29 MIN: ICD-10-PCS | Mod: 25,,, | Performed by: PEDIATRICS

## 2023-03-28 PROCEDURE — 97597 DBRDMT OPN WND 1ST 20 CM/<: CPT | Mod: ,,, | Performed by: PEDIATRICS

## 2023-03-28 PROCEDURE — 99213 OFFICE O/P EST LOW 20 MIN: CPT | Mod: 25,,, | Performed by: PEDIATRICS

## 2023-03-28 RX ORDER — METRONIDAZOLE 500 MG/1
TABLET ORAL
Status: DISPENSED
Start: 2023-03-28 | End: 2023-03-28

## 2023-03-28 NOTE — PROGRESS NOTES
Subjective:       Patient ID: Antonio Galvan II is a 57 y.o. male.    Chief Complaint: Pressure Ulcer (R heel, Sacrum, L trochanter, R ischial pressure ulcerations.   Follow up with md for re-evaluation and treatment. Wound vac to R ischium )    HPI  Review of Systems      Objective:      Temp:  [97.9 °F (36.6 °C)]   Pulse:  [121]   Resp:  [18]   BP: (148)/(88)   SpO2:  [99 %]   Physical Exam       Altered Skin Integrity 05/06/22 1140 Right Heel  Full thickness tissue loss. Subcutaneous fat may be visible but bone, tendon or muscle are not exposed (Active)   05/06/22 1140   Altered Skin Integrity Present on Admission - Did Patient arrive to the hospital with altered skin?: yes   Side: Right   Orientation:    Location: Heel   Wound Number:    Is this injury device related?: No   Primary Wound Type:    Description of Altered Skin Integrity: Full thickness tissue loss. Subcutaneous fat may be visible but bone, tendon or muscle are not exposed   Ankle-Brachial Index:    Pulses:    Removal Indication and Assessment:    Wound Outcome:    (Retired) Wound Length (cm):    (Retired) Wound Width (cm):    (Retired) Depth (cm):    Wound Description (Comments):    Removal Indications:    Wound Image   03/28/23 1211   Dressing Appearance Intact;Moist drainage 03/28/23 1211   Drainage Amount Moderate 03/28/23 1211   Drainage Characteristics/Odor Serosanguineous;No odor;Bleeding controlled 03/28/23 1211   Appearance Red;Granulating 03/28/23 1211   Tissue loss description Full thickness 03/28/23 1211   Red (%), Wound Tissue Color 100 % 03/28/23 1211   Periwound Area Scar tissue;Moist 03/28/23 1211   Wound Edges Defined 03/28/23 1211   Wound Length (cm) 5.2 cm 03/28/23 1211   Wound Width (cm) 5 cm 03/28/23 1211   Wound Depth (cm) 0.1 cm 03/28/23 1211   Wound Volume (cm^3) 2.6 cm^3 03/28/23 1211   Wound Surface Area (cm^2) 26 cm^2 03/28/23 1211   Care Cleansed with:;Sterile normal saline 03/28/23 1211   Dressing  Applied;Collagen;Gauze;Absorptive Pad;Rolled gauze 03/28/23 1211   Periwound Care Skin barrier film applied 03/28/23 1211   Compression Tubular elasticized bandage 03/28/23 1211            Altered Skin Integrity 01/12/23 1530 Sacral spine Full thickness tissue loss with exposed bone, tendon, or muscle. Often includes undermining and tunneling. May extend into muscle and/or supporting structures. (Active)   01/12/23 1530   Altered Skin Integrity Present on Admission - Did Patient arrive to the hospital with altered skin?:    Side:    Orientation:    Location: Sacral spine   Wound Number:    Is this injury device related?:    Primary Wound Type:    Description of Altered Skin Integrity: Full thickness tissue loss with exposed bone, tendon, or muscle. Often includes undermining and tunneling. May extend into muscle and/or supporting structures.   Ankle-Brachial Index:    Pulses:    Removal Indication and Assessment:    Wound Outcome:    (Retired) Wound Length (cm):    (Retired) Wound Width (cm):    (Retired) Depth (cm):    Wound Description (Comments):    Removal Indications:    Wound Image   03/28/23 1211   Description of Altered Skin Integrity Full thickness tissue loss. Subcutaneous fat may be visible but bone, tendon or muscle are not exposed 03/28/23 1211   Dressing Appearance Intact;Moist drainage 03/28/23 1211   Drainage Amount Moderate 03/28/23 1211   Drainage Characteristics/Odor Serosanguineous;No odor;Bleeding controlled 03/28/23 1211   Appearance Pink;Not granulating;White;Fibrin;Yellow;Slough 03/28/23 1211   Tissue loss description Full thickness 03/28/23 1211   Periwound Area Macerated;Scar tissue 03/28/23 1211   Wound Length (cm) 4.5 cm 03/28/23 1211   Wound Width (cm) 1.9 cm 03/28/23 1211   Wound Depth (cm) 0.1 cm 03/28/23 1211   Wound Volume (cm^3) 0.855 cm^3 03/28/23 1211   Wound Surface Area (cm^2) 8.55 cm^2 03/28/23 1211   Care Cleansed with:;Sterile normal saline 03/28/23 1211   Dressing  Applied;Honey;Sodium chloride impregnated;Gauze;Absorptive Pad;Other (comment) 03/28/23 1211   Periwound Care Skin barrier film applied 03/28/23 1211            Altered Skin Integrity 01/12/23 1532 Right Ischial tuberosity Full thickness tissue loss with exposed bone, tendon, or muscle. Often includes undermining and tunneling. May extend into muscle and/or supporting structures. (Active)   01/12/23 1532   Altered Skin Integrity Present on Admission - Did Patient arrive to the hospital with altered skin?:    Side: Right   Orientation:    Location: Ischial tuberosity   Wound Number:    Is this injury device related?:    Primary Wound Type:    Description of Altered Skin Integrity: Full thickness tissue loss with exposed bone, tendon, or muscle. Often includes undermining and tunneling. May extend into muscle and/or supporting structures.   Ankle-Brachial Index:    Pulses:    Removal Indication and Assessment:    Wound Outcome:    (Retired) Wound Length (cm):    (Retired) Wound Width (cm):    (Retired) Depth (cm):    Wound Description (Comments):    Removal Indications:    Wound Image     03/28/23 1211   Dressing Appearance Intact;Moist drainage 03/28/23 1211   Drainage Amount Moderate 03/28/23 1211   Drainage Characteristics/Odor Serosanguineous;Malodorous;Creamy;Bleeding controlled 03/28/23 1211   Appearance Black;Gray;Yellow;Necrotic;Eschar;Slough;Red;Granulating;Bone;Muscle 03/28/23 1211   Tissue loss description Full thickness 03/28/23 1211   Black (%), Wound Tissue Color 65 % 03/28/23 1211   Red (%), Wound Tissue Color 35 % 03/28/23 1211   Periwound Area Scar tissue;Macerated 03/28/23 1211   Wound Edges Defined 03/28/23 1211   Wound Length (cm) 14.3 cm 03/28/23 1211   Wound Width (cm) 16.6 cm 03/28/23 1211   Wound Depth (cm) 5.2 cm 03/28/23 1211   Wound Volume (cm^3) 1234.376 cm^3 03/28/23 1211   Wound Surface Area (cm^2) 237.38 cm^2 03/28/23 1211   Care Cleansed with:;Sterile normal saline 03/28/23 1211    Dressing Applied;Honey;Other (comment) 03/28/23 1211   Periwound Care Hydrocolloid barrier applied;Skin barrier film applied 03/28/23 1211            Altered Skin Integrity 02/09/23 1324 Right anterior Ankle Ulceration Full thickness tissue loss. Subcutaneous fat may be visible but bone, tendon or muscle are not exposed (Active)   02/09/23 1324   Altered Skin Integrity Present on Admission - Did Patient arrive to the hospital with altered skin?: yes   Side: Right   Orientation: anterior   Location: Ankle   Wound Number:    Is this injury device related?:    Primary Wound Type: Ulceration   Description of Altered Skin Integrity: Full thickness tissue loss. Subcutaneous fat may be visible but bone, tendon or muscle are not exposed   Ankle-Brachial Index:    Pulses:    Removal Indication and Assessment:    Wound Outcome:    (Retired) Wound Length (cm):    (Retired) Wound Width (cm):    (Retired) Depth (cm):    Wound Description (Comments):    Removal Indications:    Wound Image   03/28/23 1211   Dressing Appearance Intact;Dried drainage 03/28/23 1211   Drainage Amount Scant 03/28/23 1211   Drainage Characteristics/Odor Sanguineous 03/28/23 1211   Appearance Red;Granulating 03/28/23 1211   Tissue loss description Full thickness 03/28/23 1211   Red (%), Wound Tissue Color 100 % 03/28/23 1211   Periwound Area Intact 03/28/23 1211   Wound Edges Defined 03/28/23 1211   Wound Length (cm) 0.4 cm 03/28/23 1211   Wound Width (cm) 0.7 cm 03/28/23 1211   Wound Depth (cm) 0.1 cm 03/28/23 1211   Wound Volume (cm^3) 0.028 cm^3 03/28/23 1211   Wound Surface Area (cm^2) 0.28 cm^2 03/28/23 1211   Care Cleansed with:;Sterile normal saline 03/28/23 1211   Dressing Applied;Collagen;Gauze;Rolled gauze 03/28/23 1211   Compression Tubular elasticized bandage 03/28/23 1211            Altered Skin Integrity Left Greater trochanter Full thickness tissue loss. Base is covered by slough and/or eschar in the wound bed (Active)       Altered Skin  Integrity Present on Admission - Did Patient arrive to the hospital with altered skin?:    Side: Left   Orientation:    Location: Greater trochanter   Wound Number:    Is this injury device related?:    Primary Wound Type:    Description of Altered Skin Integrity: Full thickness tissue loss. Base is covered by slough and/or eschar in the wound bed   Ankle-Brachial Index:    Pulses:    Removal Indication and Assessment:    Wound Outcome:    (Retired) Wound Length (cm):    (Retired) Wound Width (cm):    (Retired) Depth (cm):    Wound Description (Comments):    Removal Indications:    Wound Image    03/28/23 1211   Description of Altered Skin Integrity Full thickness tissue loss. Base is covered by slough and/or eschar in the wound bed 03/28/23 1211   Dressing Appearance Intact;Moist drainage 03/28/23 1211   Drainage Amount Moderate 03/28/23 1211   Drainage Characteristics/Odor Purulent;Tan;Malodorous;Bleeding controlled 03/28/23 1211   Appearance Red;Granulating;Gray;Black;Slough 03/28/23 1211   Tissue loss description Full thickness 03/28/23 1211   Black (%), Wound Tissue Color 50 % 03/28/23 1211   Red (%), Wound Tissue Color 50 % 03/28/23 1211   Periwound Area Redness 03/28/23 1211   Wound Edges Defined 03/28/23 1211   Wound Length (cm) 5 cm 03/28/23 1211   Wound Width (cm) 6.5 cm 03/28/23 1211   Wound Depth (cm) 1 cm 03/28/23 1211   Wound Volume (cm^3) 32.5 cm^3 03/28/23 1211   Wound Surface Area (cm^2) 32.5 cm^2 03/28/23 1211   Care Cleansed with:;Sterile normal saline;Debrided 03/28/23 1211   Dressing Applied;Honey;Sodium chloride impregnated;Gauze;Other (comment) 03/28/23 1211   Periwound Care Skin barrier film applied 03/28/23 1211         Assessment:   Today right heel wound is 5.2 cm x 5 cm with a depth of 0.1 cm.  This is granulating.  Area was cleaned and collagen with silver was applied to the right heel.  Right anterior ankle wound is small and this was cleaned and collagen with silver was applied also.   Right ischial tuberosity was 16.3 cm x 16.6 cm with a depth of 5.2 cm.  Tissue is black gray necrotic with eschar slough granulation tissue.  This was cleaned and crashed much nights is all was applied to the area and then Medihoney and wound VAC was applied.  Sacral wound was pink and not granulating.  There is white fibrin and yellow slough.  This cleaned and collagen was applied.  Left greater trochanter wound is 5 cm x 6.5 cm with a depth of 1 cm.  This was cleaned and a selective debridement was done because of the slough and necrotic tissue.  See procedure note.  Was applied and bandages were to the area.  Patient continue with current treatment and will return on Friday for wound VAC change and dressing changes.  Patient will return in 1 week for MD visit.    Lab Visit on 03/28/2023   Component Date Value Ref Range Status    Sodium Level 03/28/2023 137  136 - 145 mmol/L Final    Potassium Level 03/28/2023 4.3  3.5 - 5.1 mmol/L Final    Chloride 03/28/2023 102  98 - 107 mmol/L Final    Carbon Dioxide 03/28/2023 26  22 - 29 mmol/L Final    Glucose Level 03/28/2023 294 (H)  74 - 100 mg/dL Final    Blood Urea Nitrogen 03/28/2023 21.7  8.4 - 25.7 mg/dL Final    Creatinine 03/28/2023 0.84  0.73 - 1.18 mg/dL Final    Calcium Level Total 03/28/2023 8.4  8.4 - 10.2 mg/dL Final    Protein Total 03/28/2023 7.0  6.4 - 8.3 gm/dL Final    Albumin Level 03/28/2023 2.7 (L)  3.5 - 5.0 g/dL Final    Globulin 03/28/2023 4.3 (H)  2.4 - 3.5 gm/dL Final    Albumin/Globulin Ratio 03/28/2023 0.6 (L)  1.1 - 2.0 ratio Final    Bilirubin Total 03/28/2023 0.4  <=1.5 mg/dL Final    Alkaline Phosphatase 03/28/2023 102  40 - 150 unit/L Final    Alanine Aminotransferase 03/28/2023 14  0 - 55 unit/L Final    Aspartate Aminotransferase 03/28/2023 9  5 - 34 unit/L Final    eGFR 03/28/2023 >60  mls/min/1.73/m2 Final    WBC 03/28/2023 5.4  4.5 - 11.5 x10(3)/mcL Final    RBC 03/28/2023 4.43 (L)  4.70 - 6.10 x10(6)/mcL Final    Hgb 03/28/2023 7.8  (L)  14.0 - 18.0 g/dL Final    Hct 03/28/2023 29.6 (L)  42.0 - 52.0 % Final    MCV 03/28/2023 66.8 (L)  80.0 - 94.0 fL Final    MCH 03/28/2023 17.6 (L)  27.0 - 31.0 pg Final    MCHC 03/28/2023 26.4 (L)  33.0 - 36.0 g/dL Final    RDW 03/28/2023 20.9 (H)  11.5 - 17.0 % Final    Platelet 03/28/2023 332  130 - 400 x10(3)/mcL Final    MPV 03/28/2023 9.8  7.4 - 10.4 fL Final    Neut % 03/28/2023 70.4  % Final    Lymph % 03/28/2023 20.7  % Final    Mono % 03/28/2023 5.9  % Final    Eos % 03/28/2023 2.6  % Final    Basophil % 03/28/2023 0.2  % Final    Lymph # 03/28/2023 1.12  0.6 - 4.6 x10(3)/mcL Final    Neut # 03/28/2023 3.80  2.1 - 9.2 x10(3)/mcL Final    Mono # 03/28/2023 0.32  0.1 - 1.3 x10(3)/mcL Final    Eos # 03/28/2023 0.14  0 - 0.9 x10(3)/mcL Final    Baso # 03/28/2023 0.01  0 - 0.2 x10(3)/mcL Final    IG# 03/28/2023 0.01  0 - 0.04 x10(3)/mcL Final    IG% 03/28/2023 0.2  % Final                 ICD-10-CM ICD-9-CM   1. Pressure injury of right ischium, stage 4  L89.314 707.05     707.24   2. 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, stage 4, unspecified laterality  L89.44 707.09     707.24   3. Pressure injury of right heel, stage 3  L89.613 707.07     707.23   4. Pressure ulcer of ischium, right, stage IV  L89.314 707.05     707.24   5. Pressure injury of trochanteric region of left hip, stage 4  L89.224 707.04     707.24   6. Pressure injury of right ankle, stage 3  L89.513 707.06     707.23   7. Quadriplegia  G82.50 344.00         Plan:   Tissue pathology and/or culture taken:  [] Yes [x] No   Sharp debridement performed:   [x] Yes [] No   Labs ordered this visit:   [x] Yes [] No   Imaging ordered this visit:   [] Yes [x] No           Orders Placed This Encounter   Procedures    Change dressing     Periwound care: Skin prep periwounds; Hydrocolloid barrier to R ischium periwound   Primary dressing: Collagen Ag to R heel and R anterior ankle. Medihoney and Mesalt to L trochanter and  sacrum  Medihoney or Santyl and Black Granufoam to R ischium.   Secondary dressing: Gauze, abd pad, kerlix, secure with Retention tape to R heel/R ankle/ sacrum/coccyx.  Gauze, abd, secure with retention tape to L trochanter. Transparent film to R ischium. Gauze, abd pad, secure with retention tape to coccyx   Offloading: Offload as much as possible   Edema control: Tubigrip E to RLE  Frequency: QOD to RLE. Daily to L trochanter and coccyx  Follow-up: MD Visit on Tuesday  / Friday   Other Orders: NPWT @ -125mmHg to R ischium.   Apply crushed Metronidazole to R ischium wound bed base prior to Wound vac application.        Follow up in about 3 days (around 3/31/2023) for nurse visit Friday, md visit Tuesday.

## 2023-03-28 NOTE — ADDENDUM NOTE
Encounter addended by: Ronald Barcenas MD on: 3/28/2023 4:16 PM   Actions taken: SmartForm saved, Clinical Note Signed, Problem List modified

## 2023-03-28 NOTE — PROCEDURES
"Debridement    Date/Time: 3/28/2023 10:00 AM  Performed by: Ronald Barcenas MD  Authorized by: Ronald Barcenas MD     Time out: Immediately prior to procedure a "time out" was called to verify the correct patient, procedure, equipment, support staff and site/side marked as required.    Consent Done?:  Yes (Written)    Preparation: Patient was prepped and draped with clean technique    Local anesthesia used?: No      Wound Details:    Location:  Left leg    Type of Debridement:  Non-excisional       Length (cm):  5.5       Area (sq cm):  37.4       Width (cm):  6.8       Percent Debrided (%):  100       Depth (cm):  4       Total Area Debrided (sq cm):  37.4    Depth of debridement:  Subcutaneous tissue    Devitalized tissue debrided:  Necrotic/Eschar, Slough and Biofilm    Instruments:  Blade  Bleeding:  None  Patient tolerance:  Patient tolerated the procedure well with no immediate complications  1st Wound Pain Assessment: 0  "

## 2023-03-31 ENCOUNTER — HOSPITAL ENCOUNTER (OUTPATIENT)
Dept: WOUND CARE | Facility: HOSPITAL | Age: 58
Discharge: HOME | End: 2023-03-31
Attending: NURSE PRACTITIONER
Payer: MEDICARE

## 2023-03-31 VITALS
DIASTOLIC BLOOD PRESSURE: 70 MMHG | HEART RATE: 122 BPM | RESPIRATION RATE: 18 BRPM | TEMPERATURE: 98 F | SYSTOLIC BLOOD PRESSURE: 109 MMHG | OXYGEN SATURATION: 94 %

## 2023-03-31 DIAGNOSIS — L89.613 PRESSURE INJURY OF RIGHT HEEL, STAGE 3: ICD-10-CM

## 2023-03-31 DIAGNOSIS — L89.314 PRESSURE INJURY OF RIGHT ISCHIUM, STAGE 4: Primary | ICD-10-CM

## 2023-03-31 DIAGNOSIS — L89.224 PRESSURE INJURY OF TROCHANTERIC REGION OF LEFT HIP, STAGE 4: ICD-10-CM

## 2023-03-31 DIAGNOSIS — L89.513 PRESSURE INJURY OF RIGHT ANKLE, STAGE 3: ICD-10-CM

## 2023-03-31 DIAGNOSIS — L89.314 PRESSURE ULCER OF ISCHIUM, RIGHT, STAGE IV: ICD-10-CM

## 2023-03-31 DIAGNOSIS — L89.44 PRESSURE INJURY OF CONTIGUOUS REGION INVOLVING BACK AND BUTTOCK, STAGE 4, UNSPECIFIED LATERALITY: ICD-10-CM

## 2023-03-31 PROCEDURE — 97606 NEG PRS WND THER DME>50 SQCM: CPT

## 2023-03-31 PROCEDURE — 99212 OFFICE O/P EST SF 10 MIN: CPT

## 2023-03-31 RX ORDER — METRONIDAZOLE 500 MG/1
TABLET ORAL
Status: DISCONTINUED
Start: 2023-03-31 | End: 2023-04-01 | Stop reason: HOSPADM

## 2023-03-31 NOTE — PATIENT INSTRUCTIONS
Periwound care: Skin prep periwounds; Hydrocolloid barrier to R ischium periwound   Primary dressing: Collagen Ag to R heel and R anterior ankle. Medihoney and Mesalt to L trochanter and sacrum   Medihoney or Santyl and Black Granufoam to R ischium.   Secondary dressing: Gauze, abd pad, kerlix, secure with Retention tape to R heel/R ankle/ sacrum/coccyx.  Gauze, abd, secure with retention tape to L trochanter. Transparent film to R ischium. Gauze, abd pad, secure with retention tape to coccyx   Offloading: Offload as much as possible   Edema control: Tubigrip E to RLE   Frequency: QOD to RLE. Daily to L trochanter and coccyx   Follow-up: MD Visit on Tuesday  / Friday   Other Orders: NPWT @ -125mmHg to R ischium.   Apply crushed Metronidazole to R ischium wound bed base prior to Wound vac application.

## 2023-03-31 NOTE — PROGRESS NOTES
[facility]      Subjective:     Patient seen in clinic today.  Dressing changed as ordered.      Assessment:          Negative Pressure Wound Therapy  03/20/23 1500 Right (Active)   03/20/23 1500   Side: Right   Orientation:    Location: Ischial tuberosity   Additional Comments:    Removal Indication and Assessment:    Location:    SDO Location:    NPWT Type Vacuum Therapy 03/31/23 1451   Therapy Setting NPWT Continuous therapy 03/31/23 1451   Pressure Setting NPWT 125 mmHg 03/31/23 1451   Therapy Interventions NPWT Canister changed;Dressing changed 03/31/23 1451   Sponges Inserted NPWT Black;2 03/31/23 1451   Sponges Removed NPWT Black;2 03/31/23 1451            Altered Skin Integrity 01/12/23 1532 Right Ischial tuberosity Full thickness tissue loss with exposed bone, tendon, or muscle. Often includes undermining and tunneling. May extend into muscle and/or supporting structures. (Active)   01/12/23 1532   Altered Skin Integrity Present on Admission - Did Patient arrive to the hospital with altered skin?:    Side: Right   Orientation:    Location: Ischial tuberosity   Wound Number:    Is this injury device related?:    Primary Wound Type:    Description of Altered Skin Integrity: Full thickness tissue loss with exposed bone, tendon, or muscle. Often includes undermining and tunneling. May extend into muscle and/or supporting structures.   Ankle-Brachial Index:    Pulses:    Removal Indication and Assessment:    Wound Outcome:    (Retired) Wound Length (cm):    (Retired) Wound Width (cm):    (Retired) Depth (cm):    Wound Description (Comments):    Removal Indications:    Dressing Appearance Intact;Dry 03/31/23 1446   Drainage Amount Small 03/31/23 1446   Drainage Characteristics/Odor Serosanguineous 03/31/23 1446   Appearance Black;Gray;Tan;Necrotic;Slough;Eschar;Yellow;Pink;Red;Granulating;Bone;Muscle 03/31/23 1446   Tissue loss description Full thickness 03/31/23 1446   Black (%), Wound Tissue  Color 60 % 03/31/23 1446   Red (%), Wound Tissue Color 40 % 03/31/23 1446   Periwound Area Scar tissue 03/31/23 1446   Wound Edges Defined 03/31/23 1446   Wound Length (cm) 15.5 cm 03/31/23 1446   Wound Width (cm) 13.5 cm 03/31/23 1446   Wound Depth (cm) 3.8 cm 03/31/23 1446   Wound Volume (cm^3) 795.15 cm^3 03/31/23 1446   Wound Surface Area (cm^2) 209.25 cm^2 03/31/23 1446   Care Cleansed with:;Sterile normal saline 03/31/23 1446   Dressing Applied;Other (comment) 03/31/23 1446   Periwound Care Topical treatment applied;Hydrocolloid barrier applied 03/31/23 1446         ICD-10-CM ICD-9-CM   1. Pressure injury of right ischium, stage 4  L89.314 707.05     707.24   2. 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, stage 4, unspecified laterality  L89.44 707.09     707.24   3. Pressure injury of right heel, stage 3  L89.613 707.07     707.23   4. Pressure ulcer of ischium, right, stage IV  L89.314 707.05     707.24   5. Pressure injury of right ankle, stage 3  L89.513 707.06     707.23   6. Pressure injury of trochanteric region of left hip, stage 4  L89.224 707.04     707.24         Plan:   [unfilled]        Orders Placed This Encounter   Procedures    Change dressing     Periwound care: Skin prep periwounds; Hydrocolloid barrier to R ischium periwound   Primary dressing: Collagen Ag to R heel and R anterior ankle. Medihoney and Mesalt to L trochanter and sacrum   Medihoney or Santyl and Black Granufoam to R ischium.   Secondary dressing: Gauze, abd pad, kerlix, secure with Retention tape to R heel/R ankle/ sacrum/coccyx.  Gauze, abd, secure with retention tape to L trochanter. Transparent film to R ischium. Gauze, abd pad, secure with retention tape to coccyx   Offloading: Offload as much as possible   Edema control: Tubigrip E to RLE   Frequency: QOD to RLE. Daily to L trochanter and coccyx   Follow-up: MD Visit on Tuesday  / Friday   Other Orders: NPWT @ -125mmHg to R ischium.   Apply crushed  Metronidazole to R ischium wound bed base prior to Wound vac application.     Standing Status:   Standing     Number of Occurrences:   6     Standing Expiration Date:   5/31/2023

## 2023-04-04 ENCOUNTER — HOSPITAL ENCOUNTER (OUTPATIENT)
Dept: WOUND CARE | Facility: HOSPITAL | Age: 58
Discharge: HOME | End: 2023-04-04
Attending: NURSE PRACTITIONER
Payer: MEDICARE

## 2023-04-04 DIAGNOSIS — L89.314 PRESSURE INJURY OF RIGHT ISCHIUM, STAGE 4: Primary | ICD-10-CM

## 2023-04-04 DIAGNOSIS — L89.513 PRESSURE INJURY OF RIGHT ANKLE, STAGE 3: ICD-10-CM

## 2023-04-04 DIAGNOSIS — L89.224 PRESSURE INJURY OF TROCHANTERIC REGION OF LEFT HIP, STAGE 4: ICD-10-CM

## 2023-04-04 DIAGNOSIS — L89.314 PRESSURE ULCER OF ISCHIUM, RIGHT, STAGE IV: ICD-10-CM

## 2023-04-04 DIAGNOSIS — L89.44 PRESSURE INJURY OF CONTIGUOUS REGION INVOLVING BACK AND BUTTOCK, STAGE 4, UNSPECIFIED LATERALITY: ICD-10-CM

## 2023-04-04 DIAGNOSIS — L89.613 PRESSURE INJURY OF RIGHT HEEL, STAGE 3: ICD-10-CM

## 2023-04-04 PROCEDURE — 99213 PR OFFICE/OUTPT VISIT, EST, LEVL III, 20-29 MIN: ICD-10-PCS | Mod: 25,,, | Performed by: PEDIATRICS

## 2023-04-04 PROCEDURE — 99213 OFFICE O/P EST LOW 20 MIN: CPT | Mod: 25,,, | Performed by: PEDIATRICS

## 2023-04-04 PROCEDURE — 97608 NEG PRS WND THER NDME>50SQCM: CPT

## 2023-04-04 PROCEDURE — 99213 OFFICE O/P EST LOW 20 MIN: CPT | Mod: 25

## 2023-04-04 PROCEDURE — 97598 DBRDMT OPN WND ADDL 20CM/<: CPT

## 2023-04-04 PROCEDURE — 11045 DBRDMT SUBQ TISS EACH ADDL: CPT | Mod: ,,, | Performed by: PEDIATRICS

## 2023-04-04 PROCEDURE — 11042 DBRDMT SUBQ TIS 1ST 20SQCM/<: CPT | Mod: ,,, | Performed by: PEDIATRICS

## 2023-04-04 PROCEDURE — 11045 DEBRIDEMENT: ICD-10-PCS | Mod: ,,, | Performed by: PEDIATRICS

## 2023-04-04 PROCEDURE — 11042 DEBRIDEMENT: ICD-10-PCS | Mod: ,,, | Performed by: PEDIATRICS

## 2023-04-04 PROCEDURE — 99215 OFFICE O/P EST HI 40 MIN: CPT | Mod: 25

## 2023-04-04 PROCEDURE — 97597 DBRDMT OPN WND 1ST 20 CM/<: CPT

## 2023-04-04 RX ORDER — SULFAMETHOXAZOLE AND TRIMETHOPRIM 800; 160 MG/1; MG/1
1 TABLET ORAL 2 TIMES DAILY
Qty: 20 TABLET | Refills: 0 | Status: SHIPPED | OUTPATIENT
Start: 2023-04-04 | End: 2023-04-14

## 2023-04-04 NOTE — PROCEDURES
"Debridement    Date/Time: 4/4/2023 2:30 PM  Performed by: Ronald Barcenas MD  Authorized by: Ronald Barcenas MD   Associated wounds:        Altered Skin Integrity Left Greater trochanter Full thickness tissue loss. Base is covered by slough and/or eschar in the wound bed  Time out: Immediately prior to procedure a "time out" was called to verify the correct patient, procedure, equipment, support staff and site/side marked as required.    Consent Done?:  Yes (Written)    Preparation: Patient was prepped and draped with clean technique    Local anesthesia used?: No      Wound Details:    Location:  Left hip    Type of Debridement:  Non-excisional       Length (cm):  5.5       Area (sq cm):  30.8       Width (cm):  5.6       Percent Debrided (%):  100       Depth (cm):  2.2       Total Area Debrided (sq cm):  30.8    Depth of debridement:  Subcutaneous tissue    Devitalized tissue debrided:  Necrotic/Eschar, Slough and Biofilm    Instruments:  Blade  Bleeding:  Minimal  Hemostasis Achieved: Yes  Method Used:  Pressure  Patient tolerance:  Patient tolerated the procedure well with no immediate complications  "

## 2023-04-04 NOTE — PROGRESS NOTES
Subjective:       Patient ID: Antonio Galvan II is a 57 y.o. male.    Chief Complaint: Pressure Ulcer (R ischium, L trochanter, R heel, R anterior ankle prssure ulcers.  Reports wound vac stayed suctioning since last visit. )    HPI  Review of Systems      Objective:         Physical Exam       Negative Pressure Wound Therapy  03/20/23 1500 Right (Active)   03/20/23 1500   Side: Right   Orientation:    Location: Ischial tuberosity   Additional Comments:    Removal Indication and Assessment:    Location:    SDO Location:    NPWT Type Vacuum Therapy 04/04/23 1535   Therapy Setting NPWT Continuous therapy 04/04/23 1535   Pressure Setting NPWT 125 mmHg 04/04/23 1535   Therapy Interventions NPWT Canister changed;Dressing changed;Seal intact 04/04/23 1535   Sponges Inserted NPWT Black;2 04/04/23 1535   Sponges Removed NPWT Black;2 04/04/23 1535            Altered Skin Integrity 05/06/22 1140 Right Heel  Full thickness tissue loss. Subcutaneous fat may be visible but bone, tendon or muscle are not exposed (Active)   05/06/22 1140   Altered Skin Integrity Present on Admission - Did Patient arrive to the hospital with altered skin?: yes   Side: Right   Orientation:    Location: Heel   Wound Number:    Is this injury device related?: No   Primary Wound Type:    Description of Altered Skin Integrity: Full thickness tissue loss. Subcutaneous fat may be visible but bone, tendon or muscle are not exposed   Ankle-Brachial Index:    Pulses:    Removal Indication and Assessment:    Wound Outcome:    (Retired) Wound Length (cm):    (Retired) Wound Width (cm):    (Retired) Depth (cm):    Wound Description (Comments):    Removal Indications:    Wound Image   04/04/23 1535   Dressing Appearance Intact;Moist drainage 04/04/23 1535   Drainage Amount Moderate 04/04/23 1535   Drainage Characteristics/Odor Serosanguineous 04/04/23 1535   Appearance Red;Epithelialization;Granulating 04/04/23 1535   Tissue loss description Full thickness  04/04/23 1535   Red (%), Wound Tissue Color 100 % 04/04/23 1535   Periwound Area Scar tissue 04/04/23 1535   Wound Edges Defined 04/04/23 1535   Wound Length (cm) 5 cm 04/04/23 1535   Wound Width (cm) 3.9 cm 04/04/23 1535   Wound Depth (cm) 0.1 cm 04/04/23 1535   Wound Volume (cm^3) 1.95 cm^3 04/04/23 1535   Wound Surface Area (cm^2) 19.5 cm^2 04/04/23 1535   Care Cleansed with:;Sterile normal saline 04/04/23 1535   Dressing Applied;Collagen;Gauze;Absorptive Pad;Rolled gauze;Tubular bandage 04/04/23 1535   Periwound Care Skin barrier film applied 04/04/23 1535   Compression Tubular elasticized bandage 04/04/23 1535            Altered Skin Integrity 01/12/23 1530 Sacral spine Full thickness tissue loss with exposed bone, tendon, or muscle. Often includes undermining and tunneling. May extend into muscle and/or supporting structures. (Active)   01/12/23 1530   Altered Skin Integrity Present on Admission - Did Patient arrive to the hospital with altered skin?:    Side:    Orientation:    Location: Sacral spine   Wound Number:    Is this injury device related?:    Primary Wound Type:    Description of Altered Skin Integrity: Full thickness tissue loss with exposed bone, tendon, or muscle. Often includes undermining and tunneling. May extend into muscle and/or supporting structures.   Ankle-Brachial Index:    Pulses:    Removal Indication and Assessment:    Wound Outcome:    (Retired) Wound Length (cm):    (Retired) Wound Width (cm):    (Retired) Depth (cm):    Wound Description (Comments):    Removal Indications:    Wound Image   04/04/23 1535   Description of Altered Skin Integrity Full thickness tissue loss with exposed bone, tendon, or muscle. Often includes undermining and tunneling. May extend into muscle and/or supporting structures. 04/04/23 1535   Dressing Appearance Intact;Moist drainage 04/04/23 1535   Drainage Amount Moderate 04/04/23 1535   Drainage Characteristics/Odor Serosanguineous 04/04/23 1535    Appearance Pink;Tan;Slough;Not granulating 04/04/23 1535   Tissue loss description Full thickness 04/04/23 1535   Periwound Area Denuded;Macerated 04/04/23 1535   Wound Edges Irregular 04/04/23 1535   Wound Length (cm) 4.4 cm 04/04/23 1535   Wound Width (cm) 1.9 cm 04/04/23 1535   Wound Depth (cm) 0.1 cm 04/04/23 1535   Wound Volume (cm^3) 0.836 cm^3 04/04/23 1535   Wound Surface Area (cm^2) 8.36 cm^2 04/04/23 1535   Care Cleansed with:;Sterile normal saline 04/04/23 1535   Dressing Applied;Gauze;Absorptive Pad;Other (comment);Sodium chloride impregnated 04/04/23 1535   Periwound Care Skin barrier film applied 04/04/23 1535            Altered Skin Integrity 01/12/23 1532 Right Ischial tuberosity Full thickness tissue loss with exposed bone, tendon, or muscle. Often includes undermining and tunneling. May extend into muscle and/or supporting structures. (Active)   01/12/23 1532   Altered Skin Integrity Present on Admission - Did Patient arrive to the hospital with altered skin?:    Side: Right   Orientation:    Location: Ischial tuberosity   Wound Number:    Is this injury device related?:    Primary Wound Type:    Description of Altered Skin Integrity: Full thickness tissue loss with exposed bone, tendon, or muscle. Often includes undermining and tunneling. May extend into muscle and/or supporting structures.   Ankle-Brachial Index:    Pulses:    Removal Indication and Assessment:    Wound Outcome:    (Retired) Wound Length (cm):    (Retired) Wound Width (cm):    (Retired) Depth (cm):    Wound Description (Comments):    Removal Indications:    Wound Image   04/04/23 1535   Dressing Appearance Intact;Moist drainage 04/04/23 1535   Drainage Amount Moderate 04/04/23 1535   Drainage Characteristics/Odor Serosanguineous 04/04/23 1535   Appearance Black;Tan;Yellow;Eschar;Slough;Granulating;Adipose;Bone;Muscle 04/04/23 1535   Tissue loss description Full thickness 04/04/23 1535   Black (%), Wound Tissue Color 30 %  04/04/23 1535   Red (%), Wound Tissue Color 40 % 04/04/23 1535   Yellow (%), Wound Tissue Color 30 % 04/04/23 1535   Periwound Area Scar tissue 04/04/23 1535   Wound Edges Defined 04/04/23 1535   Wound Length (cm) 15.5 cm 04/04/23 1535   Wound Width (cm) 13.1 cm 04/04/23 1535   Wound Depth (cm) 3.8 cm 04/04/23 1535   Wound Volume (cm^3) 771.59 cm^3 04/04/23 1535   Wound Surface Area (cm^2) 203.05 cm^2 04/04/23 1535   Care Cleansed with:;Sterile normal saline 04/04/23 1535   Dressing Applied;Transparent film 04/04/23 1535            Altered Skin Integrity 02/09/23 1324 Right anterior Ankle Ulceration Full thickness tissue loss. Subcutaneous fat may be visible but bone, tendon or muscle are not exposed (Active)   02/09/23 1324   Altered Skin Integrity Present on Admission - Did Patient arrive to the hospital with altered skin?: yes   Side: Right   Orientation: anterior   Location: Ankle   Wound Number:    Is this injury device related?:    Primary Wound Type: Ulceration   Description of Altered Skin Integrity: Full thickness tissue loss. Subcutaneous fat may be visible but bone, tendon or muscle are not exposed   Ankle-Brachial Index:    Pulses:    Removal Indication and Assessment:    Wound Outcome:    (Retired) Wound Length (cm):    (Retired) Wound Width (cm):    (Retired) Depth (cm):    Wound Description (Comments):    Removal Indications:    Wound Image   04/04/23 1535   Dressing Appearance Dry;Dried drainage 04/04/23 1535   Drainage Amount Scant 04/04/23 1535   Drainage Characteristics/Odor Serous 04/04/23 1535   Appearance Red;Granulating 04/04/23 1535   Tissue loss description Full thickness 04/04/23 1535   Red (%), Wound Tissue Color 100 % 04/04/23 1535   Periwound Area Intact 04/04/23 1535   Wound Edges Defined 04/04/23 1535   Wound Length (cm) 0.3 cm 04/04/23 1535   Wound Width (cm) 0.2 cm 04/04/23 1535   Wound Depth (cm) 0.1 cm 04/04/23 1535   Wound Volume (cm^3) 0.006 cm^3 04/04/23 1535   Wound Surface  Area (cm^2) 0.06 cm^2 04/04/23 1535   Care Cleansed with:;Sterile normal saline 04/04/23 1535   Dressing Applied;Collagen;Bandaid 04/04/23 1535   Compression Tubular elasticized bandage 04/04/23 1535            Altered Skin Integrity Left Greater trochanter Full thickness tissue loss. Base is covered by slough and/or eschar in the wound bed (Active)       Altered Skin Integrity Present on Admission - Did Patient arrive to the hospital with altered skin?:    Side: Left   Orientation:    Location: Greater trochanter   Wound Number:    Is this injury device related?:    Primary Wound Type:    Description of Altered Skin Integrity: Full thickness tissue loss. Base is covered by slough and/or eschar in the wound bed   Ankle-Brachial Index:    Pulses:    Removal Indication and Assessment:    Wound Outcome:    (Retired) Wound Length (cm):    (Retired) Wound Width (cm):    (Retired) Depth (cm):    Wound Description (Comments):    Removal Indications:    Wound Image   04/04/23 1545   Description of Altered Skin Integrity Full thickness tissue loss. Base is covered by slough and/or eschar in the wound bed 04/04/23 1545   Dressing Appearance Intact;Moist drainage 04/04/23 1545   Drainage Amount Moderate 04/04/23 1545   Drainage Characteristics/Odor Purulent;Malodorous 04/04/23 1545   Appearance Pink;Not granulating;Slough;Tan;White 04/04/23 1545   Tissue loss description Full thickness 04/04/23 1545   Red (%), Wound Tissue Color 55 % 04/04/23 1545   Yellow (%), Wound Tissue Color 45 % 04/04/23 1545   Periwound Area Intact 04/04/23 1545   Wound Edges Defined 04/04/23 1545   Wound Length (cm) 5.6 cm 04/04/23 1545   Wound Width (cm) 5.4 cm 04/04/23 1545   Wound Depth (cm) 2.2 cm 04/04/23 1545   Wound Volume (cm^3) 66.528 cm^3 04/04/23 1545   Wound Surface Area (cm^2) 30.24 cm^2 04/04/23 1545   Care Cleansed with:;Sterile normal saline 04/04/23 1545   Dressing Applied;Sodium chloride impregnated;Gauze 04/04/23 1545   [facility] [place]  Care Skin barrier film applied 04/04/23 6926         Assessment:     Patient comes in today for evaluation of his wounds.  Also his wound VAC right ischial tuberosity.  All areas were cleaned and wound VAC was applied to right ischial tuberosity after area was cleaned and some tissue was removed.  Flagyl was applied to wound VAC area.  Today there is a large area of devitalized slough in the area of the left greater trochanter.  Because of this a selective debridement was done.  See procedure note.  Collagen Ag to the right heel and right anterior ankle.  Santyl Mesalt was applied to the left trochanter and sacrum.  Santyl was also applied to the right ischium and wound VAC was applied.  Patient will return on Thursday for wound VAC change and wound dressing change.  Patient will return in 1 week for physician visit.  Patient urine white cells and patient will be treated with Bactrim DS tabs.    ICD-10-CM ICD-9-CM   1. Pressure injury of right ischium, stage 4  L89.314 707.05     707.24   2. 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, stage 4, unspecified laterality  L89.44 707.09     707.24   3. Pressure injury of right heel, stage 3  L89.613 707.07     707.23   4. Pressure ulcer of ischium, right, stage IV  L89.314 707.05     707.24   5. Pressure injury of right ankle, stage 3  L89.513 707.06     707.23   6. Pressure injury of trochanteric region of left hip, stage 4  L89.224 707.04     707.24         Plan:   Tissue pathology and/or culture taken:  [] Yes [x] No   Sharp debridement performed:   [x] Yes [] No   Labs ordered this visit:   [] Yes [x] No   Imaging ordered this visit:   [] Yes [x] No           Orders Placed This Encounter   Procedures    Change dressing     Periwound care: Skin prep periwounds; Hydrocolloid barrier to R ischium periwound   Primary dressing: Collagen Ag to R heel and R anterior ankle. Santyl, Mesalt to L trochanter and sacrum   Santyl and Black Granufoam to R  ischium.   Secondary dressing: Gauze, abd pad, kerlix, secure with Retention tape to R heel/R ankle/ sacrum/coccyx.  Gauze, abd, secure with retention tape to L trochanter. Transparent film to R ischium. Gauze, abd pad, secure with retention tape to coccyx   Offloading: Offload as much as possible   Edema control: Tubigrip E to RLE   Frequency: QOD to RLE. Daily to L trochanter and coccyx   Follow-up: MD Visit on Tuesday  / Friday   Other Orders: NPWT @ -125mmHg to R ischium.   Apply crushed Metronidazole to R ischium wound bed base prior to Wound vac application.        Follow up in about 2 days (around 4/6/2023) for nurse visit Thursday, MD visit Tuesday.

## 2023-04-04 NOTE — PATIENT INSTRUCTIONS
Periwound care: Skin prep periwounds; Hydrocolloid barrier to R ischium periwound   Primary dressing: Collagen Ag to R heel and R anterior ankle. Santyl, Mesalt to L trochanter and sacrum   Santyl and Black Granufoam to R ischium.   Secondary dressing: Gauze, abd pad, kerlix, secure with Retention tape to R heel/R ankle/ sacrum/coccyx.  Gauze, abd, secure with retention tape to L trochanter. Transparent film to R ischium. Gauze, abd pad, secure with retention tape to coccyx   Offloading: Offload as much as possible   Edema control: Tubigrip F to RLE   Frequency: QOD to RLE. Daily to L trochanter and coccyx   Follow-up: MD Visit on Tuesday  / Nurse visit Thursday   Other Orders: NPWT @ -125mmHg to R ischium.   Apply crushed Metronidazole to R ischium wound bed base prior to Wound vac application.

## 2023-04-06 ENCOUNTER — HOSPITAL ENCOUNTER (OUTPATIENT)
Dept: WOUND CARE | Facility: HOSPITAL | Age: 58
Discharge: HOME | End: 2023-04-06
Attending: NURSE PRACTITIONER
Payer: MEDICARE

## 2023-04-06 VITALS
OXYGEN SATURATION: 100 % | DIASTOLIC BLOOD PRESSURE: 93 MMHG | HEART RATE: 122 BPM | RESPIRATION RATE: 18 BRPM | TEMPERATURE: 99 F | SYSTOLIC BLOOD PRESSURE: 151 MMHG

## 2023-04-06 DIAGNOSIS — L89.314 PRESSURE INJURY OF RIGHT ISCHIUM, STAGE 4: Primary | ICD-10-CM

## 2023-04-06 PROCEDURE — 97606 NEG PRS WND THER DME>50 SQCM: CPT

## 2023-04-06 PROCEDURE — 97608 NEG PRS WND THER NDME>50SQCM: CPT

## 2023-04-06 NOTE — PROGRESS NOTES
[facility]      Subjective:     Patient seen in clinic today.  Dressing changed as ordered.      Assessment:          Altered Skin Integrity 01/12/23 1532 Right Ischial tuberosity Full thickness tissue loss with exposed bone, tendon, or muscle. Often includes undermining and tunneling. May extend into muscle and/or supporting structures. (Active)   01/12/23 1532   Altered Skin Integrity Present on Admission - Did Patient arrive to the hospital with altered skin?:    Side: Right   Orientation:    Location: Ischial tuberosity   Wound Number:    Is this injury device related?:    Primary Wound Type:    Description of Altered Skin Integrity: Full thickness tissue loss with exposed bone, tendon, or muscle. Often includes undermining and tunneling. May extend into muscle and/or supporting structures.   Ankle-Brachial Index:    Pulses:    Removal Indication and Assessment:    Wound Outcome:    (Retired) Wound Length (cm):    (Retired) Wound Width (cm):    (Retired) Depth (cm):    Wound Description (Comments):    Removal Indications:    Wound Image   04/06/23 1553   Dressing Appearance Intact;Dry 04/06/23 1553   Drainage Amount Moderate 04/06/23 1553   Drainage Characteristics/Odor Serosanguineous;Malodorous 04/06/23 1553   Appearance Black;Tan;Gray;Slough;Necrotic;Eschar;Red;Granulating;Muscle 04/06/23 1553   Tissue loss description Full thickness 04/06/23 1553   Red (%), Wound Tissue Color 40 % 04/06/23 1553   Yellow (%), Wound Tissue Color 60 % 04/06/23 1553   Periwound Area Scar tissue;Dry 04/06/23 1553   Wound Edges Defined 04/06/23 1553   Wound Length (cm) 15 cm 04/06/23 1553   Wound Width (cm) 15 cm 04/06/23 1553   Wound Depth (cm) 3.6 cm 04/06/23 1553   Wound Volume (cm^3) 810 cm^3 04/06/23 1553   Wound Surface Area (cm^2) 225 cm^2 04/06/23 1553   Care Cleansed with:;Antimicrobial agent 04/06/23 1553   Dressing Applied;Other (comment) 04/06/23 1553   Periwound Care Topical treatment  applied;Hydrocolloid barrier applied;Other (see comments) 04/06/23 1553         ICD-10-CM ICD-9-CM   1. Pressure injury of right ischium, stage 4  L89.314 707.05     707.24         Plan:         Orders Placed This Encounter   Procedures    Change dressing     Periwound care: Skin prep periwounds; Hydrocolloid barrier to R ischium periwound   Primary dressing: Collagen Ag to R heel and R anterior ankle. Santyl, Mesalt to L trochanter and sacrum   Santyl and Black Granufoam to R ischium.   Secondary dressing: Gauze, abd pad, kerlix, secure with Retention tape to R heel/R ankle/ sacrum/coccyx.  Gauze, abd, secure with retention tape to L trochanter. Transparent film to R ischium. Gauze, abd pad, secure with retention tape to coccyx   Offloading: Offload as much as possible   Edema control: Tubigrip F to RLE   Frequency: QOD to RLE. Daily to L trochanter and coccyx   Follow-up: MD Visit on Tuesday  / Nurse visit Thursday   Other Orders: NPWT @ -125mmHg to R ischium.   Apply crushed Metronidazole to R ischium wound bed base prior to Wound vac application.     Standing Status:   Standing     Number of Occurrences:   15     Standing Expiration Date:   7/6/2023

## 2023-04-06 NOTE — PROGRESS NOTES
[facility]      Subjective:     Patient seen in clinic today.  Dressing changed as ordered.      Assessment:          Altered Skin Integrity 01/12/23 1532 Right Ischial tuberosity Full thickness tissue loss with exposed bone, tendon, or muscle. Often includes undermining and tunneling. May extend into muscle and/or supporting structures. (Active)   01/12/23 1532   Altered Skin Integrity Present on Admission - Did Patient arrive to the hospital with altered skin?:    Side: Right   Orientation:    Location: Ischial tuberosity   Wound Number:    Is this injury device related?:    Primary Wound Type:    Description of Altered Skin Integrity: Full thickness tissue loss with exposed bone, tendon, or muscle. Often includes undermining and tunneling. May extend into muscle and/or supporting structures.   Ankle-Brachial Index:    Pulses:    Removal Indication and Assessment:    Wound Outcome:    (Retired) Wound Length (cm):    (Retired) Wound Width (cm):    (Retired) Depth (cm):    Wound Description (Comments):    Removal Indications:    Wound Image   04/06/23 1553   Dressing Appearance Intact;Dry 04/06/23 1553   Drainage Amount Moderate 04/06/23 1553   Drainage Characteristics/Odor Serosanguineous;Malodorous 04/06/23 1553   Appearance Black;Tan;Gray;Slough;Necrotic;Eschar;Red;Granulating;Muscle 04/06/23 1553   Tissue loss description Full thickness 04/06/23 1553   Red (%), Wound Tissue Color 40 % 04/06/23 1553   Yellow (%), Wound Tissue Color 60 % 04/06/23 1553   Periwound Area Scar tissue;Dry 04/06/23 1553   Wound Edges Defined 04/06/23 1553   Wound Length (cm) 15 cm 04/06/23 1553   Wound Width (cm) 15 cm 04/06/23 1553   Wound Depth (cm) 3.6 cm 04/06/23 1553   Wound Volume (cm^3) 810 cm^3 04/06/23 1553   Wound Surface Area (cm^2) 225 cm^2 04/06/23 1553   Care Cleansed with:;Antimicrobial agent 04/06/23 1553   Dressing Applied;Other (comment) 04/06/23 1553   Periwound Care Topical treatment  applied;Hydrocolloid barrier applied;Other (see comments) 04/06/23 1553         ICD-10-CM ICD-9-CM   1. Pressure injury of right ischium, stage 4  L89.314 707.05     707.24             No orders of the defined types were placed in this encounter.

## 2023-04-11 ENCOUNTER — HOSPITAL ENCOUNTER (OUTPATIENT)
Dept: WOUND CARE | Facility: HOSPITAL | Age: 58
Discharge: HOME | End: 2023-04-11
Attending: NURSE PRACTITIONER
Payer: MEDICARE

## 2023-04-11 VITALS
RESPIRATION RATE: 18 BRPM | OXYGEN SATURATION: 100 % | SYSTOLIC BLOOD PRESSURE: 135 MMHG | TEMPERATURE: 98 F | DIASTOLIC BLOOD PRESSURE: 87 MMHG | HEART RATE: 80 BPM

## 2023-04-11 DIAGNOSIS — L89.314 PRESSURE INJURY OF RIGHT ISCHIUM, STAGE 4: ICD-10-CM

## 2023-04-11 DIAGNOSIS — L89.513 PRESSURE INJURY OF RIGHT ANKLE, STAGE 3: ICD-10-CM

## 2023-04-11 DIAGNOSIS — L89.44 PRESSURE INJURY OF CONTIGUOUS REGION INVOLVING BACK AND BUTTOCK, STAGE 4, UNSPECIFIED LATERALITY: Primary | ICD-10-CM

## 2023-04-11 DIAGNOSIS — L89.613 PRESSURE INJURY OF RIGHT HEEL, STAGE 3: ICD-10-CM

## 2023-04-11 DIAGNOSIS — L89.224 PRESSURE INJURY OF TROCHANTERIC REGION OF LEFT HIP, STAGE 4: ICD-10-CM

## 2023-04-11 PROCEDURE — 99213 OFFICE O/P EST LOW 20 MIN: CPT

## 2023-04-11 PROCEDURE — 99213 OFFICE O/P EST LOW 20 MIN: CPT | Mod: ,,, | Performed by: PEDIATRICS

## 2023-04-11 PROCEDURE — 99213 PR OFFICE/OUTPT VISIT, EST, LEVL III, 20-29 MIN: ICD-10-PCS | Mod: ,,, | Performed by: PEDIATRICS

## 2023-04-11 PROCEDURE — 97606 NEG PRS WND THER DME>50 SQCM: CPT

## 2023-04-11 NOTE — PATIENT INSTRUCTIONS
Periwound care: Skin prep periwounds; Hydrocolloid barrier to R ischium periwound   Primary dressing: Collagen Ag to R heel and R anterior ankle. Santyl, Mesalt to L trochanter and sacrum   Santyl and Black Granufoam to R ischium.   Secondary dressing: Gauze, abd pad, kerlix, secure with Retention tape to R heel/R ankle/ sacrum/coccyx.  Gauze, abd, secure with retention tape to L trochanter. Transparent film to R ischium. Gauze, abd pad, secure with retention tape to coccyx   Offloading: Offload as much as possible   Edema control: Tubigrip F to RLE   Frequency: QOD to RLE. Daily to L trochanter and coccyx   Follow-up: MD Visit on Tuesday  / Nurse visit Friday   Other Orders: NPWT @ -125mmHg to R ischium.   Apply crushed Metronidazole to R ischium wound bed base prior to Wound vac application.

## 2023-04-14 ENCOUNTER — HOSPITAL ENCOUNTER (OUTPATIENT)
Dept: WOUND CARE | Facility: HOSPITAL | Age: 58
Discharge: HOME | End: 2023-04-14
Attending: NURSE PRACTITIONER
Payer: MEDICARE

## 2023-04-14 VITALS
TEMPERATURE: 98 F | SYSTOLIC BLOOD PRESSURE: 104 MMHG | OXYGEN SATURATION: 96 % | HEART RATE: 100 BPM | DIASTOLIC BLOOD PRESSURE: 69 MMHG | RESPIRATION RATE: 16 BRPM

## 2023-04-14 DIAGNOSIS — L89.314 PRESSURE INJURY OF RIGHT ISCHIUM, STAGE 4: ICD-10-CM

## 2023-04-14 PROCEDURE — 97606 NEG PRS WND THER DME>50 SQCM: CPT

## 2023-04-14 NOTE — PROGRESS NOTES
[facility]      Subjective:     Patient seen in clinic today.  Dressing changed as ordered.      Assessment:          Altered Skin Integrity 01/12/23 1532 Right Ischial tuberosity Full thickness tissue loss with exposed bone, tendon, or muscle. Often includes undermining and tunneling. May extend into muscle and/or supporting structures. (Active)   01/12/23 1532   Altered Skin Integrity Present on Admission - Did Patient arrive to the hospital with altered skin?:    Side: Right   Orientation:    Location: Ischial tuberosity   Wound Number:    Is this injury device related?:    Primary Wound Type:    Description of Altered Skin Integrity: Full thickness tissue loss with exposed bone, tendon, or muscle. Often includes undermining and tunneling. May extend into muscle and/or supporting structures.   Ankle-Brachial Index:    Pulses:    Removal Indication and Assessment:    Wound Outcome:    (Retired) Wound Length (cm):    (Retired) Wound Width (cm):    (Retired) Depth (cm):    Wound Description (Comments):    Removal Indications:    Wound Image   04/14/23 1347   Dressing Appearance Intact;Moist drainage 04/14/23 1347   Drainage Amount Moderate 04/14/23 1347   Drainage Characteristics/Odor Serosanguineous 04/14/23 1347   Appearance Red;Yellow;Tan;Gray;Slough;Necrotic;Granulating 04/14/23 1347   Tissue loss description Full thickness 04/14/23 1347   Red (%), Wound Tissue Color 50 % 04/14/23 1347   Yellow (%), Wound Tissue Color 50 % 04/14/23 1347   Periwound Area Denuded;Scar tissue 04/14/23 1347   Wound Edges Defined 04/14/23 1347   Wound Length (cm) 14 cm 04/14/23 1347   Wound Width (cm) 14.6 cm 04/14/23 1347   Wound Depth (cm) 3.8 cm 04/14/23 1347   Wound Volume (cm^3) 776.72 cm^3 04/14/23 1347   Wound Surface Area (cm^2) 204.4 cm^2 04/14/23 1347   Care Cleansed with:;Sterile normal saline 04/14/23 1347   Dressing Applied;Transparent film 04/14/23 1347   Periwound Care Hydrocolloid barrier applied  04/14/23 1347         ICD-10-CM ICD-9-CM   1. Pressure injury of right ischium, stage 4  L89.314 707.05     707.24             Orders Placed This Encounter   Procedures    Change dressing     Periwound care: Skin prep periwounds; Hydrocolloid barrier to R ischium periwound   Primary dressing: Collagen Ag to R heel and R anterior ankle. Santyl, Mesalt to L trochanter and sacrum   Santyl and Black Granufoam to R ischium.   Secondary dressing: Gauze, abd pad, kerlix, secure with Retention tape to R heel/R ankle/ sacrum/coccyx.  Gauze, abd, secure with retention tape to L trochanter. Transparent film to R ischium. Gauze, abd pad, secure with retention tape to coccyx   Offloading: Offload as much as possible   Edema control: Tubigrip F to RLE   Frequency: QOD to RLE. Daily to L trochanter and coccyx   Follow-up: MD Visit on Tuesday  / Nurse visit Thursday   Other Orders: NPWT @ -125mmHg to R ischium.   Apply crushed Metronidazole to R ischium wound bed base prior to Wound vac application.     Standing Status:   Standing     Number of Occurrences:   1

## 2023-04-18 ENCOUNTER — HOSPITAL ENCOUNTER (OUTPATIENT)
Dept: WOUND CARE | Facility: HOSPITAL | Age: 58
Discharge: HOME | End: 2023-04-18
Attending: NURSE PRACTITIONER
Payer: MEDICARE

## 2023-04-18 VITALS
RESPIRATION RATE: 18 BRPM | SYSTOLIC BLOOD PRESSURE: 145 MMHG | TEMPERATURE: 98 F | HEART RATE: 60 BPM | DIASTOLIC BLOOD PRESSURE: 78 MMHG | OXYGEN SATURATION: 98 %

## 2023-04-18 DIAGNOSIS — L89.224 PRESSURE INJURY OF TROCHANTERIC REGION OF LEFT HIP, STAGE 4: Primary | ICD-10-CM

## 2023-04-18 DIAGNOSIS — L89.613 PRESSURE INJURY OF RIGHT HEEL, STAGE 3: ICD-10-CM

## 2023-04-18 DIAGNOSIS — L89.314 PRESSURE INJURY OF RIGHT ISCHIUM, STAGE 4: ICD-10-CM

## 2023-04-18 PROCEDURE — 97598 DBRDMT OPN WND ADDL 20CM/<: CPT

## 2023-04-18 PROCEDURE — 11046 DBRDMT MUSC&/FSCA EA ADDL: CPT

## 2023-04-18 PROCEDURE — 11043 DBRDMT MUSC&/FSCA 1ST 20/<: CPT | Mod: ,,, | Performed by: PEDIATRICS

## 2023-04-18 PROCEDURE — 97597 DBRDMT OPN WND 1ST 20 CM/<: CPT

## 2023-04-18 PROCEDURE — 99213 OFFICE O/P EST LOW 20 MIN: CPT

## 2023-04-18 PROCEDURE — 99213 OFFICE O/P EST LOW 20 MIN: CPT | Mod: 25,,, | Performed by: PEDIATRICS

## 2023-04-18 PROCEDURE — 97606 NEG PRS WND THER DME>50 SQCM: CPT

## 2023-04-18 PROCEDURE — 11043 DBRDMT MUSC&/FSCA 1ST 20/<: CPT

## 2023-04-18 PROCEDURE — 11046 DEBRIDEMENT: ICD-10-PCS | Mod: ,,, | Performed by: PEDIATRICS

## 2023-04-18 PROCEDURE — 99213 PR OFFICE/OUTPT VISIT, EST, LEVL III, 20-29 MIN: ICD-10-PCS | Mod: 25,,, | Performed by: PEDIATRICS

## 2023-04-18 PROCEDURE — 11043 DEBRIDEMENT: ICD-10-PCS | Mod: ,,, | Performed by: PEDIATRICS

## 2023-04-18 PROCEDURE — 11046 DBRDMT MUSC&/FSCA EA ADDL: CPT | Mod: ,,, | Performed by: PEDIATRICS

## 2023-04-18 NOTE — PROCEDURES
"Debridement    Date/Time: 4/18/2023 2:30 PM  Performed by: Ronald Barcenas MD  Authorized by: Ronald Barcenas MD   Associated wounds:        Altered Skin Integrity Left Greater trochanter Full thickness tissue loss. Base is covered by slough and/or eschar in the wound bed  Time out: Immediately prior to procedure a "time out" was called to verify the correct patient, procedure, equipment, support staff and site/side marked as required.    Consent Done?:  Yes (Written)    Preparation: Patient was prepped and draped with clean technique    Local anesthesia used?: No      Wound Details:    Location:  Left hip    Type of Debridement:  Non-excisional       Length (cm):  5       Area (sq cm):  24       Width (cm):  4.8       Percent Debrided (%):  100       Depth (cm):  2.8       Total Area Debrided (sq cm):  24    Depth of debridement:  Muscle/fascia/tendon    Tissue debrided:  Fascia    Devitalized tissue debrided:  Necrotic/Eschar and Fibrin    Instruments:  Blade and Scissors  Bleeding:  Minimal  Hemostasis Achieved: Yes  Method Used:  Pressure  Patient tolerance:  Patient tolerated the procedure well with no immediate complications  "

## 2023-04-18 NOTE — PATIENT INSTRUCTIONS
Periwound care: Skin prep periwounds; Hydrocolloid barrier to R ischium periwound   Primary dressing: Collagen Ag to R heel. Santyl, Mesalt to L trochanter and sacrum   Santyl and Black Granufoam to R ischium.   Secondary dressing: Gauze, abd pad, kerlix, secure with Retention tape to R heel/R ankle/ sacrum/coccyx.  Gauze, abd, secure with retention tape to L trochanter. Transparent film to R ischium. Gauze, abd pad, secure with retention tape to coccyx   Offloading: Offload as much as possible   Edema control: Tubigrip F to RLE   Frequency: QOD to RLE. Daily to L trochanter and coccyx   Follow-up: MD Visit on Tuesday  / Nurse visit Friday   Other Orders: NPWT @ -125mmHg to R ischium.   Apply crushed Metronidazole to R ischium wound bed base prior to Wound vac application.

## 2023-04-18 NOTE — PROGRESS NOTES
Subjective:       Patient ID: Antonio Galvan II is a 57 y.o. male.    Chief Complaint: Pressure Ulcer (Pressure ulcer to R ischium, R heel, L trochanter, R anterior ankle. Wound vac to R ischium. MD visit and re-assessment )    HPI  Review of Systems      Objective:      Temp:  [98 °F (36.7 °C)]   Pulse:  [60]   Resp:  [18]   BP: (145)/(78)   SpO2:  [98 %]   Physical Exam       Negative Pressure Wound Therapy  03/20/23 1500 Right (Active)   03/20/23 1500   Side: Right   Orientation:    Location: Ischial tuberosity   Additional Comments:    Removal Indication and Assessment:    Location:    SDO Location:    NPWT Type Vacuum Therapy 04/18/23 1521   Therapy Setting NPWT Continuous therapy 04/18/23 1521   Pressure Setting NPWT 125 mmHg 04/18/23 1521   Therapy Interventions NPWT Dressing changed;Seal intact 04/18/23 1521   Sponges Inserted NPWT Black;2 04/18/23 1521   Sponges Removed NPWT Black;2 04/18/23 1521            Altered Skin Integrity 05/06/22 1140 Right Heel  Full thickness tissue loss. Subcutaneous fat may be visible but bone, tendon or muscle are not exposed (Active)   05/06/22 1140   Altered Skin Integrity Present on Admission - Did Patient arrive to the hospital with altered skin?: yes   Side: Right   Orientation:    Location: Heel   Wound Number:    Is this injury device related?: No   Primary Wound Type:    Description of Altered Skin Integrity: Full thickness tissue loss. Subcutaneous fat may be visible but bone, tendon or muscle are not exposed   Ankle-Brachial Index:    Pulses:    Removal Indication and Assessment:    Wound Outcome:    (Retired) Wound Length (cm):    (Retired) Wound Width (cm):    (Retired) Depth (cm):    Wound Description (Comments):    Removal Indications:    Wound Image   04/18/23 1521   Dressing Appearance Intact;Moist drainage 04/18/23 1521   Drainage Amount Moderate 04/18/23 1521   Drainage Characteristics/Odor Serosanguineous 04/18/23 1521   Appearance  Red;Ecchymotic;Granulating 04/18/23 1521   Tissue loss description Full thickness 04/18/23 1521   Periwound Area Scar tissue 04/18/23 1521   Wound Edges Defined 04/18/23 1521   Wound Length (cm) 5.7 cm 04/18/23 1521   Wound Width (cm) 3 cm 04/18/23 1521   Wound Depth (cm) 0.1 cm 04/18/23 1521   Wound Volume (cm^3) 1.71 cm^3 04/18/23 1521   Wound Surface Area (cm^2) 17.1 cm^2 04/18/23 1521   Care Cleansed with:;Sterile normal saline 04/18/23 1521   Dressing Applied;Collagen;Gauze;Absorptive Pad;Rolled gauze 04/18/23 1521   Periwound Care Skin barrier film applied 04/18/23 1521   Compression Tubular elasticized bandage 04/18/23 1521            Altered Skin Integrity 01/12/23 1530 Sacral spine Full thickness tissue loss with exposed bone, tendon, or muscle. Often includes undermining and tunneling. May extend into muscle and/or supporting structures. (Active)   01/12/23 1530   Altered Skin Integrity Present on Admission - Did Patient arrive to the hospital with altered skin?:    Side:    Orientation:    Location: Sacral spine   Wound Number:    Is this injury device related?:    Primary Wound Type:    Description of Altered Skin Integrity: Full thickness tissue loss with exposed bone, tendon, or muscle. Often includes undermining and tunneling. May extend into muscle and/or supporting structures.   Ankle-Brachial Index:    Pulses:    Removal Indication and Assessment:    Wound Outcome:    (Retired) Wound Length (cm):    (Retired) Wound Width (cm):    (Retired) Depth (cm):    Wound Description (Comments):    Removal Indications:    Wound Image    04/18/23 1521   Description of Altered Skin Integrity Full thickness tissue loss. Subcutaneous fat may be visible but bone, tendon or muscle are not exposed 04/18/23 1521   Dressing Appearance Intact;Moist drainage 04/18/23 1521   Drainage Amount Moderate 04/18/23 1521   Drainage Characteristics/Odor Serosanguineous 04/18/23 1521   Appearance Tan;Slough;Pink;Not  granulating;Necrotic;Black 04/18/23 1521   Tissue loss description Full thickness 04/18/23 1521   Red (%), Wound Tissue Color 10 % 04/18/23 1521   Yellow (%), Wound Tissue Color 90 % 04/18/23 1521   Periwound Area Macerated;Denuded 04/18/23 1521   Wound Edges Irregular 04/18/23 1521   Wound Length (cm) 5 cm 04/18/23 1521   Wound Width (cm) 3.9 cm 04/18/23 1521   Wound Depth (cm) 0.1 cm 04/18/23 1521   Wound Volume (cm^3) 1.95 cm^3 04/18/23 1521   Wound Surface Area (cm^2) 19.5 cm^2 04/18/23 1521   Care Cleansed with:;Sterile normal saline 04/18/23 1521   Dressing Applied;Sodium chloride impregnated;Gauze;Absorptive Pad;Other (comment) 04/18/23 1521   Periwound Care Skin barrier film applied 04/18/23 1521            Altered Skin Integrity 01/12/23 1532 Right Ischial tuberosity Full thickness tissue loss with exposed bone, tendon, or muscle. Often includes undermining and tunneling. May extend into muscle and/or supporting structures. (Active)   01/12/23 1532   Altered Skin Integrity Present on Admission - Did Patient arrive to the hospital with altered skin?:    Side: Right   Orientation:    Location: Ischial tuberosity   Wound Number:    Is this injury device related?:    Primary Wound Type:    Description of Altered Skin Integrity: Full thickness tissue loss with exposed bone, tendon, or muscle. Often includes undermining and tunneling. May extend into muscle and/or supporting structures.   Ankle-Brachial Index:    Pulses:    Removal Indication and Assessment:    Wound Outcome:    (Retired) Wound Length (cm):    (Retired) Wound Width (cm):    (Retired) Depth (cm):    Wound Description (Comments):    Removal Indications:    Wound Image   04/18/23 1521   Dressing Appearance Intact;Moist drainage 04/18/23 1521   Drainage Amount Large 04/18/23 1521   Drainage Characteristics/Odor Serosanguineous 04/18/23 1521   Appearance Red;Black;Yellow;Slough;Necrotic;Ecchymotic;Granulating 04/18/23 1521   Tissue loss description  Full thickness 04/18/23 1521   Red (%), Wound Tissue Color 50 % 04/18/23 1521   Yellow (%), Wound Tissue Color 50 % 04/18/23 1521   Periwound Area Denuded;Macerated;Scar tissue 04/18/23 1521   Wound Edges Defined 04/18/23 1521   Wound Length (cm) 14.2 cm 04/18/23 1521   Wound Width (cm) 14.6 cm 04/18/23 1521   Wound Depth (cm) 3.4 cm 04/18/23 1521   Wound Volume (cm^3) 704.888 cm^3 04/18/23 1521   Wound Surface Area (cm^2) 207.32 cm^2 04/18/23 1521   Care Cleansed with:;Sterile normal saline 04/18/23 1521   Dressing Applied;Transparent film;Other (comment) 04/18/23 1521   Periwound Care Hydrocolloid barrier applied;Skin barrier film applied 04/18/23 1521            Altered Skin Integrity 02/09/23 1324 Right anterior Ankle Ulceration Full thickness tissue loss. Subcutaneous fat may be visible but bone, tendon or muscle are not exposed (Active)   02/09/23 1324   Altered Skin Integrity Present on Admission - Did Patient arrive to the hospital with altered skin?: yes   Side: Right   Orientation: anterior   Location: Ankle   Wound Number:    Is this injury device related?:    Primary Wound Type: Ulceration   Description of Altered Skin Integrity: Full thickness tissue loss. Subcutaneous fat may be visible but bone, tendon or muscle are not exposed   Ankle-Brachial Index:    Pulses:    Removal Indication and Assessment:    Wound Outcome:    (Retired) Wound Length (cm):    (Retired) Wound Width (cm):    (Retired) Depth (cm):    Wound Description (Comments):    Removal Indications:    Wound Image   04/18/23 1521   Dressing Appearance Dry;Intact 04/18/23 1521   Drainage Amount None 04/18/23 1521   Appearance Closed/resurfaced 04/18/23 1521   Periwound Area Intact 04/18/23 1521   Wound Edges Defined 04/18/23 1521   Wound Length (cm) 0 cm 04/18/23 1521   Wound Width (cm) 0 cm 04/18/23 1521   Wound Depth (cm) 0 cm 04/18/23 1521   Wound Volume (cm^3) 0 cm^3 04/18/23 1521   Wound Surface Area (cm^2) 0 cm^2 04/18/23 1521   Care  Cleansed with:;Sterile normal saline 04/18/23 1521   Dressing Applied;Bandaid 04/18/23 1521            Altered Skin Integrity Left Greater trochanter Full thickness tissue loss. Base is covered by slough and/or eschar in the wound bed (Active)       Altered Skin Integrity Present on Admission - Did Patient arrive to the hospital with altered skin?:    Side: Left   Orientation:    Location: Greater trochanter   Wound Number:    Is this injury device related?:    Primary Wound Type:    Description of Altered Skin Integrity: Full thickness tissue loss. Base is covered by slough and/or eschar in the wound bed   Ankle-Brachial Index:    Pulses:    Removal Indication and Assessment:    Wound Outcome:    (Retired) Wound Length (cm):    (Retired) Wound Width (cm):    (Retired) Depth (cm):    Wound Description (Comments):    Removal Indications:    Wound Image     04/18/23 1622   Description of Altered Skin Integrity Full thickness tissue loss. Base is covered by slough and/or eschar in the wound bed 04/18/23 1622   Dressing Appearance Intact;Moist drainage 04/18/23 1622   Drainage Amount Large 04/18/23 1622   Drainage Characteristics/Odor Malodorous;Creamy;Serosanguineous 04/18/23 1622   Appearance Red;Pink;Tan;Slough;Granulating 04/18/23 1622   Tissue loss description Full thickness 04/18/23 1622   Periwound Area Intact 04/18/23 1622   Wound Edges Defined 04/18/23 1622   Wound Length (cm) 5 cm 04/18/23 1622   Wound Width (cm) 4.8 cm 04/18/23 1622   Wound Depth (cm) 2.8 cm 04/18/23 1622   Wound Volume (cm^3) 67.2 cm^3 04/18/23 1622   Wound Surface Area (cm^2) 24 cm^2 04/18/23 1622   Undermining (depth (cm)/location) 7 o'clock to 12 o'clock. 4.2cm at 10 o'clock 04/18/23 1622   Care Cleansed with:;Sterile normal saline 04/18/23 1622   Dressing Applied;Sodium chloride impregnated;Gauze;Absorptive Pad;Other (comment) 04/18/23 1622   Periwound Care Skin barrier film applied 04/18/23 1622         Assessment:     Today the  pressure injury of the left hip has a great deal of devitalized tissue.  Because of this a selective debridement was done.  See procedure note.  Right ischial tuberosity ulcer is somewhat smaller today.  There is more bleeding today than before.  This area was cleaned and wound VAC was applied.  Sacral spine ulcer is somewhat improved today.  There is some bleeding.  Santyl Mesalt was applied to left trochanter and sacrum.  Right heel with granulating tissue with occasional ecchymotic area.  Collagen was applied and bandaged.  Patient will follow up in 3 days for a nurse visit on Friday.  Patient will return in 1 week for physician visit.    ICD-10-CM ICD-9-CM   1. 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 Pressure injury of right ischium, stage 4  L89.314 707.05     707.24   3. Pressure injury of right heel, stage 3  L89.613 707.07     707.23         Plan:   Tissue pathology and/or culture taken:  [] Yes [x] No   Sharp debridement performed:   [x] Yes [] No   Labs ordered this visit:   [] Yes [x] No   Imaging ordered this visit:   [] Yes [x] No           Orders Placed This Encounter   Procedures    Change dressing     Periwound care: Skin prep periwounds; Hydrocolloid barrier to R ischium periwound   Primary dressing: Collagen Ag to R heel and R anterior ankle. Santyl, Mesalt to L trochanter and sacrum   Santyl and Black Granufoam to R ischium.   Secondary dressing: Gauze, abd pad, kerlix, secure with Retention tape to R heel/R ankle/ sacrum/coccyx.  Gauze, abd, secure with retention tape to L trochanter. Transparent film to R ischium. Gauze, abd pad, secure with retention tape to coccyx   Offloading: Offload as much as possible   Edema control: Tubigrip F to RLE   Frequency: QOD to RLE. Daily to L trochanter and coccyx   Follow-up: MD Visit on Tuesday  / Nurse visit Thursday   Other Orders: NPWT @ -125mmHg to R ischium.   Apply crushed Metronidazole to R ischium  wound bed base prior to Wound vac application.     Standing Status:   Standing     Number of Occurrences:   1        Follow up in about 3 days (around 4/21/2023) for Nurse visit Friday; MD visit in 1 week .

## 2023-04-21 ENCOUNTER — HOSPITAL ENCOUNTER (OUTPATIENT)
Dept: WOUND CARE | Facility: HOSPITAL | Age: 58
Discharge: HOME | End: 2023-04-21
Attending: NURSE PRACTITIONER
Payer: MEDICARE

## 2023-04-21 VITALS
TEMPERATURE: 98 F | SYSTOLIC BLOOD PRESSURE: 178 MMHG | DIASTOLIC BLOOD PRESSURE: 98 MMHG | RESPIRATION RATE: 16 BRPM | HEART RATE: 118 BPM | OXYGEN SATURATION: 95 %

## 2023-04-21 DIAGNOSIS — L89.314 PRESSURE INJURY OF RIGHT ISCHIUM, STAGE 4: Primary | ICD-10-CM

## 2023-04-21 PROCEDURE — 97606 NEG PRS WND THER DME>50 SQCM: CPT

## 2023-04-21 NOTE — PROGRESS NOTES
[facility]      Subjective:     Patient seen in clinic today.  Dressing changed as ordered.      Assessment:          Negative Pressure Wound Therapy  03/20/23 1500 Right (Active)   03/20/23 1500   Side: Right   Orientation:    Location: Ischial tuberosity   Additional Comments:    Removal Indication and Assessment:    Location:    SDO Location:    NPWT Type Vacuum Therapy 04/21/23 1314   Therapy Setting NPWT Continuous therapy 04/21/23 1314   Pressure Setting NPWT 125 mmHg 04/21/23 1314   Therapy Interventions NPWT Dressing changed;Canister changed 04/21/23 1314   Sponges Inserted NPWT Black;2 04/21/23 1314   Sponges Removed NPWT Black;2 04/21/23 1314            Altered Skin Integrity 01/12/23 1532 Right Ischial tuberosity Full thickness tissue loss with exposed bone, tendon, or muscle. Often includes undermining and tunneling. May extend into muscle and/or supporting structures. (Active)   01/12/23 1532   Altered Skin Integrity Present on Admission - Did Patient arrive to the hospital with altered skin?:    Side: Right   Orientation:    Location: Ischial tuberosity   Wound Number:    Is this injury device related?:    Primary Wound Type:    Description of Altered Skin Integrity: Full thickness tissue loss with exposed bone, tendon, or muscle. Often includes undermining and tunneling. May extend into muscle and/or supporting structures.   Ankle-Brachial Index:    Pulses:    Removal Indication and Assessment:    Wound Outcome:    (Retired) Wound Length (cm):    (Retired) Wound Width (cm):    (Retired) Depth (cm):    Wound Description (Comments):    Removal Indications:    Wound Image   04/21/23 1314   Dressing Appearance Intact;Moist drainage 04/21/23 1314   Drainage Amount Moderate 04/21/23 1314   Drainage Characteristics/Odor Serosanguineous;No odor;Bleeding controlled 04/21/23 1314   Appearance Red;Granulating;Gray;Tan;White;Yellow;Necrotic;Slough 04/21/23 1314   Tissue loss description Full  thickness 04/21/23 1314   Red (%), Wound Tissue Color 40 % 04/21/23 1314   Yellow (%), Wound Tissue Color 60 % 04/21/23 1314   Periwound Area Blistered;Gray;Denuded;Scar tissue 04/21/23 1314   Wound Edges Defined 04/21/23 1314   Wound Length (cm) 12.5 cm 04/21/23 1314   Wound Width (cm) 15 cm 04/21/23 1314   Wound Depth (cm) 4 cm 04/21/23 1314   Wound Volume (cm^3) 750 cm^3 04/21/23 1314   Wound Surface Area (cm^2) 187.5 cm^2 04/21/23 1314   Care Cleansed with:;Sterile normal saline 04/21/23 1314   Dressing Applied 04/21/23 1314   Periwound Care Hydrocolloid barrier applied 04/21/23 1314         ICD-10-CM ICD-9-CM   1. Pressure injury of right ischium, stage 4  L89.314 707.05     707.24                No orders of the defined types were placed in this encounter.

## 2023-04-25 ENCOUNTER — HOSPITAL ENCOUNTER (OUTPATIENT)
Dept: WOUND CARE | Facility: HOSPITAL | Age: 58
Discharge: HOME | End: 2023-04-25
Attending: NURSE PRACTITIONER
Payer: MEDICARE

## 2023-04-25 VITALS
HEART RATE: 117 BPM | SYSTOLIC BLOOD PRESSURE: 111 MMHG | TEMPERATURE: 98 F | DIASTOLIC BLOOD PRESSURE: 74 MMHG | OXYGEN SATURATION: 98 % | RESPIRATION RATE: 16 BRPM

## 2023-04-25 DIAGNOSIS — L89.613 PRESSURE INJURY OF RIGHT HEEL, STAGE 3: ICD-10-CM

## 2023-04-25 DIAGNOSIS — L89.224 PRESSURE INJURY OF TROCHANTERIC REGION OF LEFT HIP, STAGE 4: Primary | ICD-10-CM

## 2023-04-25 DIAGNOSIS — L89.314 PRESSURE INJURY OF RIGHT ISCHIUM, STAGE 4: ICD-10-CM

## 2023-04-25 PROCEDURE — 11043 DEBRIDEMENT: ICD-10-PCS | Mod: ,,, | Performed by: PEDIATRICS

## 2023-04-25 PROCEDURE — 99213 PR OFFICE/OUTPT VISIT, EST, LEVL III, 20-29 MIN: ICD-10-PCS | Mod: 25,,, | Performed by: PEDIATRICS

## 2023-04-25 PROCEDURE — 11046 DEBRIDEMENT: ICD-10-PCS | Mod: ,,, | Performed by: PEDIATRICS

## 2023-04-25 PROCEDURE — 97597 DBRDMT OPN WND 1ST 20 CM/<: CPT

## 2023-04-25 PROCEDURE — 99215 OFFICE O/P EST HI 40 MIN: CPT

## 2023-04-25 PROCEDURE — 11046 DBRDMT MUSC&/FSCA EA ADDL: CPT | Mod: ,,, | Performed by: PEDIATRICS

## 2023-04-25 PROCEDURE — 97598 DBRDMT OPN WND ADDL 20CM/<: CPT

## 2023-04-25 PROCEDURE — 87077 CULTURE AEROBIC IDENTIFY: CPT

## 2023-04-25 PROCEDURE — 99213 OFFICE O/P EST LOW 20 MIN: CPT | Mod: 25

## 2023-04-25 PROCEDURE — 11046 DBRDMT MUSC&/FSCA EA ADDL: CPT

## 2023-04-25 PROCEDURE — 99213 OFFICE O/P EST LOW 20 MIN: CPT | Mod: 25,,, | Performed by: PEDIATRICS

## 2023-04-25 PROCEDURE — 11043 DBRDMT MUSC&/FSCA 1ST 20/<: CPT | Mod: ,,, | Performed by: PEDIATRICS

## 2023-04-25 PROCEDURE — 11043 DBRDMT MUSC&/FSCA 1ST 20/<: CPT

## 2023-04-25 PROCEDURE — 97606 NEG PRS WND THER DME>50 SQCM: CPT

## 2023-04-25 RX ORDER — SULFAMETHOXAZOLE AND TRIMETHOPRIM 800; 160 MG/1; MG/1
1 TABLET ORAL 2 TIMES DAILY
Qty: 20 TABLET | Refills: 0 | Status: SHIPPED | OUTPATIENT
Start: 2023-04-25 | End: 2023-05-05

## 2023-04-25 NOTE — PATIENT INSTRUCTIONS
Periwound care: Skin prep periwounds; Hydrocolloid barrier to R ischium periwound   Primary dressing: Collagen Ag to R heel and R anterior ankle. Santyl, Mesalt to L trochanter and sacrum. May crush 1/2 tablet of Metronidazole and apply it to L trochanter prior to Santyl/Mesalt   Santyl and Black Granufoam to R ischium.   Santyl and Mesalt was also applied to area of breakdown in between legs.   Secondary dressing: Gauze, abd pad, kerlix, secure with Retention tape to R heel/R ankle/ sacrum/coccyx.  Gauze, abd, secure with retention tape to L trochanter. Transparent film to R ischium. Gauze, abd pad, secure with retention tape to coccyx   Offloading: Offload as much as possible   Edema control: Tubigrip F to RLE   Frequency: QOD to RLE. Daily to L trochanter and coccyx   Follow-up: MD Visit on Tuesday  / Nurse visit Thursday   Other Orders: NPWT @ -125mmHg to R ischium.   Apply crushed Metronidazole to R ischium wound bed base prior to Wound vac application.    A culture was obtained from L hip. A prescription for Bactrim will be sent to your pharmacy.  and take as directed. You may also crush a 1/2 a Metronidazole tablet and apply to wound bed and L trochanter.   If an antibiotic change is needed once your culture returns, we will give you a call.

## 2023-04-25 NOTE — PROGRESS NOTES
Subjective:       Patient ID: Atnonio Galvan II is a 57 y.o. male.    Chief Complaint: Pressure Ulcer (Pressure ulcers to R ischium, sacrum, R anterior ankle, L trochanter MD visit and re-assessment )    HPI  Review of Systems      Objective:      Temp:  [98.2 °F (36.8 °C)]   Pulse:  [117]   Resp:  [16]   BP: (111)/(74)   SpO2:  [98 %]   Physical Exam       Negative Pressure Wound Therapy  03/20/23 1500 Right (Active)   03/20/23 1500   Side: Right   Orientation:    Location: Ischial tuberosity   Additional Comments:    Removal Indication and Assessment:    Location:    SDO Location:    NPWT Type Vacuum Therapy 04/25/23 1501   Therapy Setting NPWT Continuous therapy 04/25/23 1501   Pressure Setting NPWT 125 mmHg 04/25/23 1501   Therapy Interventions NPWT Dressing changed 04/25/23 1501   Sponges Inserted NPWT Black;2 04/25/23 1501   Sponges Removed NPWT Black;2 04/25/23 1501            Altered Skin Integrity 05/06/22 1140 Right Heel  Full thickness tissue loss. Subcutaneous fat may be visible but bone, tendon or muscle are not exposed (Active)   05/06/22 1140   Altered Skin Integrity Present on Admission - Did Patient arrive to the hospital with altered skin?: yes   Side: Right   Orientation:    Location: Heel   Wound Number:    Is this injury device related?: No   Primary Wound Type:    Description of Altered Skin Integrity: Full thickness tissue loss. Subcutaneous fat may be visible but bone, tendon or muscle are not exposed   Ankle-Brachial Index:    Pulses:    Removal Indication and Assessment:    Wound Outcome:    (Retired) Wound Length (cm):    (Retired) Wound Width (cm):    (Retired) Depth (cm):    Wound Description (Comments):    Removal Indications:    Wound Image    04/25/23 1501   Dressing Appearance Intact;Moist drainage 04/25/23 1501   Drainage Amount Moderate 04/25/23 1501   Drainage Characteristics/Odor Serosanguineous 04/25/23 1501   Appearance Red;Ecchymotic;Granulating 04/25/23 1501   Tissue  loss description Full thickness 04/25/23 1501   Red (%), Wound Tissue Color 100 % 04/25/23 1501   Periwound Area Scar tissue 04/25/23 1501   Wound Edges Defined 04/25/23 1501   Wound Length (cm) 5 cm 04/25/23 1501   Wound Width (cm) 2.1 cm 04/25/23 1501   Wound Depth (cm) 0.1 cm 04/25/23 1501   Wound Volume (cm^3) 1.05 cm^3 04/25/23 1501   Wound Surface Area (cm^2) 10.5 cm^2 04/25/23 1501   Care Cleansed with:;Sterile normal saline 04/25/23 1501   Dressing Applied;Collagen;Gauze;Absorptive Pad;Rolled gauze;Tubular bandage 04/25/23 1501   Periwound Care Skin barrier film applied 04/25/23 1501   Compression Tubular elasticized bandage 04/25/23 1501            Altered Skin Integrity 01/12/23 1530 Sacral spine Full thickness tissue loss with exposed bone, tendon, or muscle. Often includes undermining and tunneling. May extend into muscle and/or supporting structures. (Active)   01/12/23 1530   Altered Skin Integrity Present on Admission - Did Patient arrive to the hospital with altered skin?:    Side:    Orientation:    Location: Sacral spine   Wound Number:    Is this injury device related?:    Primary Wound Type:    Description of Altered Skin Integrity: Full thickness tissue loss with exposed bone, tendon, or muscle. Often includes undermining and tunneling. May extend into muscle and/or supporting structures.   Ankle-Brachial Index:    Pulses:    Removal Indication and Assessment:    Wound Outcome:    (Retired) Wound Length (cm):    (Retired) Wound Width (cm):    (Retired) Depth (cm):    Wound Description (Comments):    Removal Indications:    Wound Image   04/25/23 1501   Description of Altered Skin Integrity Full thickness tissue loss with exposed bone, tendon, or muscle. Often includes undermining and tunneling. May extend into muscle and/or supporting structures. 04/25/23 1501   Dressing Appearance Intact;Moist drainage 04/25/23 1501   Drainage Amount Moderate 04/25/23 1501   Drainage Characteristics/Odor  Serosanguineous 04/25/23 1501   Appearance Red;Tan;Pink;Granulating;Necrotic 04/25/23 1501   Tissue loss description Full thickness 04/25/23 1501   Red (%), Wound Tissue Color 50 % 04/25/23 1501   Yellow (%), Wound Tissue Color 50 % 04/25/23 1501   Periwound Area Macerated 04/25/23 1501   Wound Edges Irregular 04/25/23 1501   Wound Length (cm) 5 cm 04/25/23 1501   Wound Width (cm) 1.3 cm 04/25/23 1501   Wound Depth (cm) 0.1 cm 04/25/23 1501   Wound Volume (cm^3) 0.65 cm^3 04/25/23 1501   Wound Surface Area (cm^2) 6.5 cm^2 04/25/23 1501   Care Cleansed with:;Sterile normal saline 04/25/23 1501   Dressing Applied;Sodium chloride impregnated;Gauze;Absorptive Pad;Other (comment) 04/25/23 1501   Periwound Care Skin barrier film applied 04/25/23 1501            Altered Skin Integrity 01/12/23 1532 Right Ischial tuberosity Full thickness tissue loss with exposed bone, tendon, or muscle. Often includes undermining and tunneling. May extend into muscle and/or supporting structures. (Active)   01/12/23 1532   Altered Skin Integrity Present on Admission - Did Patient arrive to the hospital with altered skin?:    Side: Right   Orientation:    Location: Ischial tuberosity   Wound Number:    Is this injury device related?:    Primary Wound Type:    Description of Altered Skin Integrity: Full thickness tissue loss with exposed bone, tendon, or muscle. Often includes undermining and tunneling. May extend into muscle and/or supporting structures.   Ankle-Brachial Index:    Pulses:    Removal Indication and Assessment:    Wound Outcome:    (Retired) Wound Length (cm):    (Retired) Wound Width (cm):    (Retired) Depth (cm):    Wound Description (Comments):    Removal Indications:    Wound Image   04/25/23 1501   Dressing Appearance Intact;Moist drainage 04/25/23 1501   Drainage Amount Moderate 04/25/23 1501   Drainage Characteristics/Odor Serosanguineous 04/25/23 1501   Appearance  Red;Tan;Black;Yellow;Necrotic;Slough;Granulating;Ecchymotic 04/25/23 1501   Tissue loss description Full thickness 04/25/23 1501   Red (%), Wound Tissue Color 40 % 04/25/23 1501   Yellow (%), Wound Tissue Color 60 % 04/25/23 1501   Periwound Area Denuded 04/25/23 1501   Wound Edges Defined 04/25/23 1501   Wound Length (cm) 14 cm 04/25/23 1501   Wound Width (cm) 15.3 cm 04/25/23 1501   Wound Depth (cm) 3.7 cm 04/25/23 1501   Wound Volume (cm^3) 792.54 cm^3 04/25/23 1501   Wound Surface Area (cm^2) 214.2 cm^2 04/25/23 1501   Care Cleansed with:;Sterile normal saline 04/25/23 1501   Dressing Applied;Other (comment) 04/25/23 1501   Periwound Care Hydrocolloid barrier applied 04/25/23 1501            Altered Skin Integrity 02/09/23 1324 Right anterior Ankle Ulceration Full thickness tissue loss. Subcutaneous fat may be visible but bone, tendon or muscle are not exposed (Active)   02/09/23 1324   Altered Skin Integrity Present on Admission - Did Patient arrive to the hospital with altered skin?: yes   Side: Right   Orientation: anterior   Location: Ankle   Wound Number:    Is this injury device related?:    Primary Wound Type: Ulceration   Description of Altered Skin Integrity: Full thickness tissue loss. Subcutaneous fat may be visible but bone, tendon or muscle are not exposed   Ankle-Brachial Index:    Pulses:    Removal Indication and Assessment:    Wound Outcome:    (Retired) Wound Length (cm):    (Retired) Wound Width (cm):    (Retired) Depth (cm):    Wound Description (Comments):    Removal Indications:    Wound Image   04/25/23 1501   Dressing Appearance Intact;Moist drainage 04/25/23 1501   Drainage Amount Scant 04/25/23 1501   Drainage Characteristics/Odor Serosanguineous;No odor 04/25/23 1501   Appearance Red;Granulating 04/25/23 1501   Red (%), Wound Tissue Color 100 % 04/25/23 1501   Periwound Area Intact 04/25/23 1501   Wound Edges Defined 04/25/23 1501   Wound Length (cm) 0.9 cm 04/25/23 1501   Wound Width  (cm) 0.5 cm 04/25/23 1501   Wound Depth (cm) 0.1 cm 04/25/23 1501   Wound Volume (cm^3) 0.045 cm^3 04/25/23 1501   Wound Surface Area (cm^2) 0.45 cm^2 04/25/23 1501   Care Cleansed with:;Sterile normal saline 04/25/23 1501   Dressing Applied;Collagen;Bandaid 04/25/23 1501   Compression Tubular elasticized bandage 04/25/23 1501            Altered Skin Integrity Left Greater trochanter Full thickness tissue loss. Base is covered by slough and/or eschar in the wound bed (Active)       Altered Skin Integrity Present on Admission - Did Patient arrive to the hospital with altered skin?:    Side: Left   Orientation:    Location: Greater trochanter   Wound Number:    Is this injury device related?:    Primary Wound Type:    Description of Altered Skin Integrity: Full thickness tissue loss. Base is covered by slough and/or eschar in the wound bed   Ankle-Brachial Index:    Pulses:    Removal Indication and Assessment:    Wound Outcome:    (Retired) Wound Length (cm):    (Retired) Wound Width (cm):    (Retired) Depth (cm):    Wound Description (Comments):    Removal Indications:    Wound Image   04/25/23 1550   Description of Altered Skin Integrity Full thickness tissue loss. Base is covered by slough and/or eschar in the wound bed 04/25/23 1550   Dressing Appearance Intact;Moist drainage 04/25/23 1550   Drainage Amount Large 04/25/23 1550   Drainage Characteristics/Odor Creamy;Malodorous 04/25/23 1550   Appearance Pink;Tan;Other (see comments);Green;Slough;Necrotic;Gray 04/25/23 1550   Tissue loss description Full thickness 04/25/23 1550   Periwound Area Intact 04/25/23 1550   Wound Edges Defined 04/25/23 1550   Wound Length (cm) 5.2 cm 04/25/23 1550   Wound Width (cm) 4.5 cm 04/25/23 1550   Wound Depth (cm) 2.2 cm 04/25/23 1550   Wound Volume (cm^3) 51.48 cm^3 04/25/23 1550   Wound Surface Area (cm^2) 23.4 cm^2 04/25/23 1550   Undermining (depth (cm)/location) 6 o'clock to 12 o'clock. 5cm at 9 o'clock 04/25/23 5160   Care  Cleansed with:;Sterile normal saline;Debrided 04/25/23 1550   Dressing Applied;Sodium chloride impregnated;Gauze;Absorptive Pad;Other (comment) 04/25/23 1550   Periwound Care Skin barrier film applied 04/25/23 1550         Assessment:     Today all wounds show some degree of painless and deterioration.  All areas were cleaned and the area of the right ischium had wound VAC applied.  There is some granulation but also necrosis to all areas.  Left greater trochanter ulcer is malodorous and has creamy drainage.  There is slough and eschar on the wound bed.  Because of this a selective debridement of the wound was done.  See procedure note.  Culture was taken and patient will be started on Bactrim DS tabs.  Left trochanter and coccyx will continue to have daily dressing changes and right lower extremity with every other day changes.  Patient will return in 1 week for a followup.    ICD-10-CM ICD-9-CM   1. 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 Pressure injury of right ischium, stage 4  L89.314 707.05     707.24   3. Pressure injury of right heel, stage 3  L89.613 707.07     707.23         Plan:   Tissue pathology and/or culture taken:  [x] Yes [] No   Sharp debridement performed:   [x] Yes [] No   Labs ordered this visit:   [] Yes [x] No   Imaging ordered this visit:   [] Yes [x] No           Orders Placed This Encounter   Procedures    Wound Culture    Change dressing     Periwound care: Skin prep periwounds; Hydrocolloid barrier to R ischium periwound   Primary dressing: Collagen Ag to R heel and R anterior ankle. Santyl, Mesalt to L trochanter and sacrum. May crush 1/2 Metronidazole tablet and apply to L trochanter wound bed prior to applying Santyl/Mesalt  Santyl and Black Granufoam to R ischium.   Secondary dressing: Gauze, abd pad, kerlix, secure with Retention tape to R heel/R ankle/ sacrum/coccyx.  Gauze, abd, secure with retention tape to L trochanter.  Transparent film to R ischium. Gauze, abd pad, secure with retention tape to coccyx   Offloading: Offload as much as possible   Edema control: Tubigrip F to RLE   Frequency: QOD to RLE. Daily to L trochanter and coccyx   Follow-up: MD Visit on Tuesday  / Nurse visit Thursday   Other Orders: NPWT @ -125mmHg to R ischium.   Apply crushed Metronidazole to R ischium wound bed base prior to Wound vac application.     Standing Status:   Standing     Number of Occurrences:   30     Standing Expiration Date:   7/25/2023        Follow up in about 3 days (around 4/28/2023) for Nurse visit Friday; MD visit in 1 week.

## 2023-04-25 NOTE — PROCEDURES
"Debridement    Date/Time: 4/25/2023 2:30 PM  Performed by: Ronald Barcenas MD  Authorized by: Ronald Barcenas MD   Associated wounds:        Altered Skin Integrity Left Greater trochanter Full thickness tissue loss. Base is covered by slough and/or eschar in the wound bed  Time out: Immediately prior to procedure a "time out" was called to verify the correct patient, procedure, equipment, support staff and site/side marked as required.    Consent Done?:  Yes (Written)    Preparation: Patient was prepped and draped with clean technique    Local anesthesia used?: No      Wound Details:    Location:  Left hip    Type of Debridement:  Non-excisional       Length (cm):  4.5       Area (sq cm):  23.4       Width (cm):  5.2       Percent Debrided (%):  100       Depth (cm):  2.2       Total Area Debrided (sq cm):  23.4    Depth of debridement:  Muscle/fascia/tendon    Devitalized tissue debrided:  Fibrin, Necrotic/Eschar and Slough    Instruments:  Scissors and Forceps  Bleeding:  None  Patient tolerance:  Patient tolerated the procedure well with no immediate complications  "

## 2023-04-26 DIAGNOSIS — L89.513 PRESSURE INJURY OF RIGHT ANKLE, STAGE 3: ICD-10-CM

## 2023-04-26 DIAGNOSIS — L89.314 PRESSURE INJURY OF RIGHT ISCHIUM, STAGE 4: Primary | ICD-10-CM

## 2023-04-26 DIAGNOSIS — L89.224 PRESSURE INJURY OF TROCHANTERIC REGION OF LEFT HIP, STAGE 4: ICD-10-CM

## 2023-04-26 DIAGNOSIS — L89.44 PRESSURE INJURY OF CONTIGUOUS REGION INVOLVING BACK AND BUTTOCK, STAGE 4, UNSPECIFIED LATERALITY: ICD-10-CM

## 2023-04-26 DIAGNOSIS — L89.613 PRESSURE INJURY OF RIGHT HEEL, STAGE 3: ICD-10-CM

## 2023-04-26 NOTE — ADDENDUM NOTE
Encounter addended by: Ca Acuna RN on: 4/26/2023 7:33 AM   Actions taken: Actions taken from a BestPractice Advisory

## 2023-04-28 ENCOUNTER — HOSPITAL ENCOUNTER (OUTPATIENT)
Dept: WOUND CARE | Facility: HOSPITAL | Age: 58
Discharge: HOME | End: 2023-04-28
Attending: NURSE PRACTITIONER
Payer: MEDICARE

## 2023-04-28 VITALS
DIASTOLIC BLOOD PRESSURE: 87 MMHG | OXYGEN SATURATION: 97 % | RESPIRATION RATE: 16 BRPM | HEART RATE: 114 BPM | TEMPERATURE: 98 F | SYSTOLIC BLOOD PRESSURE: 143 MMHG

## 2023-04-28 DIAGNOSIS — L89.314 PRESSURE INJURY OF RIGHT ISCHIUM, STAGE 4: ICD-10-CM

## 2023-04-28 DIAGNOSIS — L89.613 PRESSURE INJURY OF RIGHT HEEL, STAGE 3: ICD-10-CM

## 2023-04-28 DIAGNOSIS — L89.224 PRESSURE INJURY OF TROCHANTERIC REGION OF LEFT HIP, STAGE 4: ICD-10-CM

## 2023-04-28 PROCEDURE — 97606 NEG PRS WND THER DME>50 SQCM: CPT

## 2023-04-28 RX ORDER — AMOXICILLIN AND CLAVULANATE POTASSIUM 500; 125 MG/1; MG/1
1 TABLET, FILM COATED ORAL 3 TIMES DAILY
Qty: 30 TABLET | Refills: 0 | Status: SHIPPED | OUTPATIENT
Start: 2023-04-28 | End: 2023-05-08

## 2023-04-28 NOTE — PATIENT INSTRUCTIONS
Periwound care: Skin prep periwounds; Hydrocolloid barrier to R ischium periwound   Primary dressing: Collagen Ag to R heel and R anterior ankle. Santyl, Mesalt to L trochanter and sacrum. May crush 1/2 tablet of Metronidazole and apply it to L trochanter prior to Santyl/Mesalt   Santyl and Black Granufoam to R ischium.   Santyl and Mesalt was also applied to area of breakdown in between legs.   Secondary dressing: Gauze, abd pad, kerlix, secure with Retention tape to R heel/R ankle/ sacrum/coccyx.  Gauze, abd, secure with retention tape to L trochanter. Transparent film to R ischium. Gauze, abd pad, secure with retention tape to coccyx   Offloading: Offload as much as possible   Edema control: Tubigrip F to RLE   Frequency: QOD to RLE. Daily to L trochanter and coccyx   Follow-up: MD Visit on Tuesday  / Nurse visit Thursday   Other Orders: NPWT @ -125mmHg to R ischium.   Apply crushed Metronidazole to R ischium wound bed base prior to Wound vac application.     A prescription for Augmentin will be sent to your pharmacy.  and take as directed.

## 2023-04-28 NOTE — PROGRESS NOTES
[facility]      Subjective:     Patient seen in clinic today.  Dressing changed as ordered.      Assessment:          Altered Skin Integrity 01/12/23 1532 Right Ischial tuberosity Full thickness tissue loss with exposed bone, tendon, or muscle. Often includes undermining and tunneling. May extend into muscle and/or supporting structures. (Active)   01/12/23 1532   Altered Skin Integrity Present on Admission - Did Patient arrive to the hospital with altered skin?:    Side: Right   Orientation:    Location: Ischial tuberosity   Wound Number:    Is this injury device related?:    Primary Wound Type:    Description of Altered Skin Integrity: Full thickness tissue loss with exposed bone, tendon, or muscle. Often includes undermining and tunneling. May extend into muscle and/or supporting structures.   Ankle-Brachial Index:    Pulses:    Removal Indication and Assessment:    Wound Outcome:    (Retired) Wound Length (cm):    (Retired) Wound Width (cm):    (Retired) Depth (cm):    Wound Description (Comments):    Removal Indications:    Wound Image   04/28/23 1322   Dressing Appearance Intact;Moist drainage 04/28/23 1322   Drainage Amount Moderate 04/28/23 1322   Drainage Characteristics/Odor Serosanguineous 04/28/23 1322   Appearance Red;Granulating;Maroon;Purple;Gray;Tan;Slough;Adipose 04/28/23 1322   Tissue loss description Full thickness 04/28/23 1322   Red (%), Wound Tissue Color 50 % 04/28/23 1322   Yellow (%), Wound Tissue Color 50 % 04/28/23 1322   Periwound Area Denuded 04/28/23 1322   Wound Edges Defined 04/28/23 1322   Wound Length (cm) 12.5 cm 04/28/23 1322   Wound Width (cm) 15 cm 04/28/23 1322   Wound Depth (cm) 3.5 cm 04/28/23 1322   Wound Volume (cm^3) 656.25 cm^3 04/28/23 1322   Wound Surface Area (cm^2) 187.5 cm^2 04/28/23 1322   Care Cleansed with:;Sterile normal saline 04/28/23 1322   Dressing Applied;Other (comment) 04/28/23 1322   Periwound Care Skin barrier film applied;Hydrocolloid  barrier applied;Other (see comments) 04/28/23 1322            Altered Skin Integrity Left Greater trochanter Full thickness tissue loss. Base is covered by slough and/or eschar in the wound bed (Active)       Altered Skin Integrity Present on Admission - Did Patient arrive to the hospital with altered skin?:    Side: Left   Orientation:    Location: Greater trochanter   Wound Number:    Is this injury device related?:    Primary Wound Type:    Description of Altered Skin Integrity: Full thickness tissue loss. Base is covered by slough and/or eschar in the wound bed   Ankle-Brachial Index:    Pulses:    Removal Indication and Assessment:    Wound Outcome:    (Retired) Wound Length (cm):    (Retired) Wound Width (cm):    (Retired) Depth (cm):    Wound Description (Comments):    Removal Indications:    Wound Image   04/28/23 1322         ICD-10-CM ICD-9-CM   1. Pressure injury of right ischium, stage 4  L89.314 707.05     707.24   2. 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 Pressure injury of right heel, stage 3  L89.613 707.07     707.23         Plan:   Notified Dr. Barcenas per telephone of preliminary culture results.   Pt is currently on Bactrim DS, not sensitive to proteus species resulting at this time.   Telephone order received for Augmentin 1 tab po TID x 10 days.  Pt to continue Bactrim as well at this time until final cultures result.   Order sent electronically.   Pt instructions given on new antibiotic regimen.  Verbalized understanding.          Orders Placed This Encounter   Procedures    Change dressing     Periwound care: Skin prep periwounds; Hydrocolloid barrier to R ischium periwound   Primary dressing: Collagen Ag to R heel and R anterior ankle. Santyl, Mesalt to L trochanter and sacrum. May crush 1/2 Metronidazole tablet and apply to L trochanter wound bed prior to applying Santyl/Mesalt  Santyl and Black Granufoam to R ischium.   Secondary  dressing: Gauze, abd pad, kerlix, secure with Retention tape to R heel/R ankle/ sacrum/coccyx.  Gauze, abd, secure with retention tape to L trochanter. Transparent film to R ischium. Gauze, abd pad, secure with retention tape to coccyx   Offloading: Offload as much as possible   Edema control: Tubigrip F to RLE   Frequency: QOD to RLE. Daily to L trochanter and coccyx   Follow-up: MD Visit on Tuesday  / Nurse visit Thursday   Other Orders: NPWT @ -125mmHg to R ischium.   Apply crushed Metronidazole to R ischium wound bed base prior to Wound vac application.     Standing Status:   Standing     Number of Occurrences:   1

## 2023-04-29 LAB
BACTERIA SPEC CULT: ABNORMAL
BACTERIA SPEC CULT: ABNORMAL

## 2023-05-02 ENCOUNTER — HOSPITAL ENCOUNTER (OUTPATIENT)
Dept: WOUND CARE | Facility: HOSPITAL | Age: 58
Discharge: HOME | End: 2023-05-02
Attending: NURSE PRACTITIONER
Payer: MEDICARE

## 2023-05-02 VITALS
DIASTOLIC BLOOD PRESSURE: 72 MMHG | RESPIRATION RATE: 16 BRPM | HEART RATE: 86 BPM | SYSTOLIC BLOOD PRESSURE: 107 MMHG | TEMPERATURE: 99 F | OXYGEN SATURATION: 99 %

## 2023-05-02 DIAGNOSIS — L89.314 PRESSURE INJURY OF RIGHT ISCHIUM, STAGE 4: Primary | ICD-10-CM

## 2023-05-02 DIAGNOSIS — L89.613 PRESSURE INJURY OF RIGHT HEEL, STAGE 3: ICD-10-CM

## 2023-05-02 DIAGNOSIS — L89.224 PRESSURE INJURY OF TROCHANTERIC REGION OF LEFT HIP, STAGE 4: ICD-10-CM

## 2023-05-02 DIAGNOSIS — L89.44 PRESSURE INJURY OF CONTIGUOUS REGION INVOLVING BACK AND BUTTOCK, STAGE 4, UNSPECIFIED LATERALITY: ICD-10-CM

## 2023-05-02 PROCEDURE — 99213 OFFICE O/P EST LOW 20 MIN: CPT | Mod: 25,,, | Performed by: PEDIATRICS

## 2023-05-02 PROCEDURE — 17250 PR CHEM CAUTERY GRANULATN TISSUE: ICD-10-PCS | Mod: ,,, | Performed by: PEDIATRICS

## 2023-05-02 PROCEDURE — 99213 OFFICE O/P EST LOW 20 MIN: CPT

## 2023-05-02 PROCEDURE — 17250 CHEM CAUT OF GRANLTJ TISSUE: CPT | Mod: ,,, | Performed by: PEDIATRICS

## 2023-05-02 PROCEDURE — 99213 PR OFFICE/OUTPT VISIT, EST, LEVL III, 20-29 MIN: ICD-10-PCS | Mod: 25,,, | Performed by: PEDIATRICS

## 2023-05-02 PROCEDURE — 97606 NEG PRS WND THER DME>50 SQCM: CPT

## 2023-05-02 RX ORDER — SILVER NITRATE 38.21; 12.74 MG/1; MG/1
STICK TOPICAL
Status: DISCONTINUED
Start: 2023-05-02 | End: 2023-05-03 | Stop reason: HOSPADM

## 2023-05-02 NOTE — PROGRESS NOTES
Subjective:       Patient ID: Antonio Galvan II is a 57 y.o. male.    Chief Complaint: Pressure Ulcer (Pressure ulcers to R ischium, sacrum, R anterior ankle, L trochanter MD visit and re-assessment )    HPI  Review of Systems      Objective:      Temp:  [98.6 °F (37 °C)]   Pulse:  [86]   Resp:  [16]   BP: (107)/(72)   SpO2:  [99 %]   Physical Exam       Negative Pressure Wound Therapy  03/20/23 1500 Right (Active)   03/20/23 1500   Side: Right   Orientation:    Location: Ischial tuberosity   Additional Comments:    Removal Indication and Assessment:    Location:    SDO Location:    NPWT Type Vacuum Therapy 05/02/23 1452   Therapy Setting NPWT Continuous therapy 05/02/23 1452   Pressure Setting NPWT 125 mmHg 05/02/23 1452   Therapy Interventions NPWT Dressing changed 05/02/23 1452   Sponges Inserted NPWT Black;2 05/02/23 1452   Sponges Removed NPWT Black;2 05/02/23 1452            Altered Skin Integrity 05/06/22 1140 Right Heel  Full thickness tissue loss. Subcutaneous fat may be visible but bone, tendon or muscle are not exposed (Active)   05/06/22 1140   Altered Skin Integrity Present on Admission - Did Patient arrive to the hospital with altered skin?: yes   Side: Right   Orientation:    Location: Heel   Wound Number:    Is this injury device related?: No   Primary Wound Type:    Description of Altered Skin Integrity: Full thickness tissue loss. Subcutaneous fat may be visible but bone, tendon or muscle are not exposed   Ankle-Brachial Index:    Pulses:    Removal Indication and Assessment:    Wound Outcome:    (Retired) Wound Length (cm):    (Retired) Wound Width (cm):    (Retired) Depth (cm):    Wound Description (Comments):    Removal Indications:    Wound Image   05/02/23 1452   Dressing Appearance Intact;Moist drainage 05/02/23 1452   Drainage Amount Moderate 05/02/23 1452   Drainage Characteristics/Odor Serosanguineous 05/02/23 1452   Appearance Red;Granulating;Hypergranulation 05/02/23 1452   Tissue  loss description Full thickness 05/02/23 1452   Red (%), Wound Tissue Color 100 % 05/02/23 1452   Periwound Area Scar tissue 05/02/23 1452   Wound Edges Defined 05/02/23 1452   Wound Length (cm) 5 cm 05/02/23 1452   Wound Width (cm) 3.5 cm 05/02/23 1452   Wound Depth (cm) 0.1 cm 05/02/23 1452   Wound Volume (cm^3) 1.75 cm^3 05/02/23 1452   Wound Surface Area (cm^2) 17.5 cm^2 05/02/23 1452   Care Cleansed with:;Sterile normal saline 05/02/23 1452   Dressing Applied;Gauze;Absorptive Pad;Rolled gauze;Hydrofiber;Silver 05/02/23 1452   Periwound Care Skin barrier film applied 05/02/23 1452   Compression Tubular elasticized bandage 05/02/23 1452            Altered Skin Integrity 01/12/23 1530 Sacral spine Full thickness tissue loss with exposed bone, tendon, or muscle. Often includes undermining and tunneling. May extend into muscle and/or supporting structures. (Active)   01/12/23 1530   Altered Skin Integrity Present on Admission - Did Patient arrive to the hospital with altered skin?:    Side:    Orientation:    Location: Sacral spine   Wound Number:    Is this injury device related?:    Primary Wound Type:    Description of Altered Skin Integrity: Full thickness tissue loss with exposed bone, tendon, or muscle. Often includes undermining and tunneling. May extend into muscle and/or supporting structures.   Ankle-Brachial Index:    Pulses:    Removal Indication and Assessment:    Wound Outcome:    (Retired) Wound Length (cm):    (Retired) Wound Width (cm):    (Retired) Depth (cm):    Wound Description (Comments):    Removal Indications:    Wound Image   05/02/23 1452   Description of Altered Skin Integrity Full thickness tissue loss with exposed bone, tendon, or muscle. Often includes undermining and tunneling. May extend into muscle and/or supporting structures. 05/02/23 1452   Dressing Appearance Intact;Moist drainage 05/02/23 1452   Drainage Amount Moderate 05/02/23 1452   Drainage Characteristics/Odor  Serosanguineous;No odor;Green 05/02/23 1452   Appearance Red;Tan;Slough;Granulating 05/02/23 1452   Tissue loss description Full thickness 05/02/23 1452   Red (%), Wound Tissue Color 60 % 05/02/23 1452   Yellow (%), Wound Tissue Color 40 % 05/02/23 1452   Periwound Area Macerated 05/02/23 1452   Wound Edges Defined 05/02/23 1452   Wound Length (cm) 3.9 cm 05/02/23 1452   Wound Width (cm) 1.4 cm 05/02/23 1452   Wound Depth (cm) 0.1 cm 05/02/23 1452   Wound Volume (cm^3) 0.546 cm^3 05/02/23 1452   Wound Surface Area (cm^2) 5.46 cm^2 05/02/23 1452   Care Cleansed with:;Sterile normal saline 05/02/23 1452   Dressing Applied;Sodium chloride impregnated;Gauze;Absorptive Pad 05/02/23 1452   Periwound Care Skin barrier film applied 05/02/23 1452            Altered Skin Integrity 01/12/23 1532 Right Ischial tuberosity Full thickness tissue loss with exposed bone, tendon, or muscle. Often includes undermining and tunneling. May extend into muscle and/or supporting structures. (Active)   01/12/23 1532   Altered Skin Integrity Present on Admission - Did Patient arrive to the hospital with altered skin?:    Side: Right   Orientation:    Location: Ischial tuberosity   Wound Number:    Is this injury device related?:    Primary Wound Type:    Description of Altered Skin Integrity: Full thickness tissue loss with exposed bone, tendon, or muscle. Often includes undermining and tunneling. May extend into muscle and/or supporting structures.   Ankle-Brachial Index:    Pulses:    Removal Indication and Assessment:    Wound Outcome:    (Retired) Wound Length (cm):    (Retired) Wound Width (cm):    (Retired) Depth (cm):    Wound Description (Comments):    Removal Indications:    Wound Image   05/02/23 1452   Dressing Appearance Intact;Moist drainage 05/02/23 1452   Drainage Amount Moderate 05/02/23 1452   Drainage Characteristics/Odor Serosanguineous 05/02/23 1452   Appearance Red;Black;Tan;Yellow;Slough;Granulating;Adipose;Necrotic  05/02/23 1452   Tissue loss description Full thickness 05/02/23 1452   Red (%), Wound Tissue Color 50 % 05/02/23 1452   Yellow (%), Wound Tissue Color 50 % 05/02/23 1452   Periwound Area Denuded 05/02/23 1452   Wound Edges Defined 05/02/23 1452   Wound Length (cm) 12.5 cm 05/02/23 1452   Wound Width (cm) 15.3 cm 05/02/23 1452   Wound Depth (cm) 3.3 cm 05/02/23 1452   Wound Volume (cm^3) 631.125 cm^3 05/02/23 1452   Wound Surface Area (cm^2) 191.25 cm^2 05/02/23 1452   Care Cleansed with:;Sterile normal saline;Antimicrobial agent 05/02/23 1452   Dressing Applied;Transparent film 05/02/23 1452   Periwound Care Skin barrier film applied 05/02/23 1452            Altered Skin Integrity 02/09/23 1324 Right anterior Ankle Ulceration Full thickness tissue loss. Subcutaneous fat may be visible but bone, tendon or muscle are not exposed (Active)   02/09/23 1324   Altered Skin Integrity Present on Admission - Did Patient arrive to the hospital with altered skin?: yes   Side: Right   Orientation: anterior   Location: Ankle   Wound Number:    Is this injury device related?:    Primary Wound Type: Ulceration   Description of Altered Skin Integrity: Full thickness tissue loss. Subcutaneous fat may be visible but bone, tendon or muscle are not exposed   Ankle-Brachial Index:    Pulses:    Removal Indication and Assessment:    Wound Outcome:    (Retired) Wound Length (cm):    (Retired) Wound Width (cm):    (Retired) Depth (cm):    Wound Description (Comments):    Removal Indications:    Wound Image   05/02/23 1452   Dressing Appearance Intact;Moist drainage 05/02/23 1452   Drainage Amount Scant 05/02/23 1452   Drainage Characteristics/Odor Sanguineous 05/02/23 1452   Appearance Red;Granulating 05/02/23 1452   Red (%), Wound Tissue Color 100 % 05/02/23 1452   Periwound Area Intact 05/02/23 1452   Wound Edges Defined 05/02/23 1452   Wound Length (cm) 0.3 cm 05/02/23 1452   Wound Width (cm) 0.2 cm 05/02/23 1452   Wound Depth (cm) 0.1  cm 05/02/23 1452   Wound Volume (cm^3) 0.006 cm^3 05/02/23 1452   Wound Surface Area (cm^2) 0.06 cm^2 05/02/23 1452   Care Cleansed with:;Sterile normal saline 05/02/23 1452   Dressing Applied;Collagen;Bandaid 05/02/23 1452            Altered Skin Integrity Left Greater trochanter Full thickness tissue loss. Base is covered by slough and/or eschar in the wound bed (Active)       Altered Skin Integrity Present on Admission - Did Patient arrive to the hospital with altered skin?:    Side: Left   Orientation:    Location: Greater trochanter   Wound Number:    Is this injury device related?:    Primary Wound Type:    Description of Altered Skin Integrity: Full thickness tissue loss. Base is covered by slough and/or eschar in the wound bed   Ankle-Brachial Index:    Pulses:    Removal Indication and Assessment:    Wound Outcome:    (Retired) Wound Length (cm):    (Retired) Wound Width (cm):    (Retired) Depth (cm):    Wound Description (Comments):    Removal Indications:    Wound Image   05/02/23 1552   Description of Altered Skin Integrity Full thickness tissue loss with exposed bone, tendon, or muscle. Often includes undermining and tunneling. May extend into muscle and/or supporting structures. 05/02/23 1552   Dressing Appearance Intact;Moist drainage 05/02/23 1552   Drainage Amount Moderate 05/02/23 1552   Drainage Characteristics/Odor Tan;Creamy;Malodorous;Bleeding controlled 05/02/23 1552   Appearance Red;Granulating;Tan;Gray;Necrotic;Slough 05/02/23 1552   Tissue loss description Full thickness 05/02/23 1552   Periwound Area Intact 05/02/23 1552   Wound Edges Defined 05/02/23 1552   Wound Length (cm) 4 cm 05/02/23 1552   Wound Width (cm) 5.2 cm 05/02/23 1552   Wound Depth (cm) 2.8 cm 05/02/23 1552   Wound Volume (cm^3) 58.24 cm^3 05/02/23 1552   Wound Surface Area (cm^2) 20.8 cm^2 05/02/23 1552   Undermining (depth (cm)/location) 6 o'clock to 9 o'clock. 4.5cm at 9 o'clock 05/02/23 1552   Care Cleansed  with:;Antimicrobial agent 05/02/23 1552   Dressing Applied;Sodium chloride impregnated;Gauze;Absorptive Pad;Other (comment) 05/02/23 1552   Periwound Care Skin barrier film applied 05/02/23 1552         Assessment:     Comes in today for normal changes and evaluation and debriding of his other wounds.  Today the pressure injury of the right ischium is showing some duration of tissue.  This area was cleaned and a selective debridement was done..  Patient also had increased in the wound of his right heel.  Posterior sacral wound appears to be some with some granulation tissue.  Peroneal appears to be superficial but has been bleeding.  This was cauterized with silver nitrate.  The pressure injury of the left trochanter is continuing to have some generation around the periosteum.  Patient's other problems include a chronic anemia which is progressing and getting steadily worse.  Patient also has low pre-albumin and albumin.  This appears to be because of his decrease in his nutrition and appetite.  Patient also has positive cultures of tissue that was taken from the left trochanter wound.  This includes Actinabacter which is not sensitive to anything oral.  Patient does have topical antibiotic powder which was compound it for this particular problem.  We will continue to dress wounds and use wound VAC as below.  Spoke with Dr. Parra and he concurs with me that needed hospitalization and IV antibiotics.  Patient also needs his nutritional status addressed and his anemia followed.  Dr. Parra felt that Dr. Parra felt that we could admit to patient start on antibiotics and he would do debridement surgically next week.  The patient and his mother were informed of this.  Patient was informed that if we did not do a hospitalization and IV antibiotics that he could become septic and die.  He agreed to hospitalization and we will speak with case management and hospitalist tomorrow.  Patient is to return on Friday  for VAC and dressing changes.    ICD-10-CM ICD-9-CM   1. Pressure injury of right ischium, stage 4  L89.314 707.05     707.24   2. 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 Pressure injury of right heel, stage 3  L89.613 707.07     707.23   4. 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, stage 4, unspecified laterality  L89.44 707.09     707.24         Plan:   Tissue pathology and/or culture taken:  [] Yes [x] No   Sharp debridement performed:   [] Yes [x] No   Labs ordered this visit:   [] Yes [x] No   Imaging ordered this visit:   [] Yes [x] No           Orders Placed This Encounter   Procedures    Change dressing     Cleanse with Vashe   Skin prep periwounds; Hydrocolloid barrier to R ischium periwound   Primary dressing: Aquacel Ag or Opticel Ag to R heel. Santyl, Mesalt to L trochanter and sacrum.  Apply Topical antibiotic compound previously received from [facility] then Santyl/Mesalt.  Santyl and Black Granufoam to R ischium.   Secondary dressing: Gauze, abd pad, kerlix, secure with Retention tape to R heel/R ankle/ sacrum/coccyx.  Gauze, abd, secure with retention tape to L trochanter. Transparent film to R ischium. Gauze, abd pad, secure with retention tape to coccyx   Offloading: Offload as much as possible   Edema control: Tubigrip F to RLE   Frequency: QOD to RLE. Daily to L trochanter and coccyx   Follow-up: MD Visit on Tuesday  / Nurse visit Thursday   Other Orders: NPWT @ -125mmHg to R ischium. Apply crushed Metronidazole to R ischium wound bed base prior to Wound vac application.    Recommend admittance to [facility] in order to have IV antibiotics, debridement, Wound Vac changes.  Will discuss with Case Management at hospital and notify you tomorrow of further plan        Follow up in about 1 week (around 5/9/2023).

## 2023-05-02 NOTE — PATIENT INSTRUCTIONS
Cleanse with Vashe   Skin prep periwounds; Hydrocolloid barrier to R ischium periwound   Primary dressing: Aquacel Ag or Opticel Ag to R heel. Santyl, Mesalt to L trochanter and sacrum.  Apply Topical antibiotic compound previously received from [facility] then Santyl/Mesalt.  Santyl and Black Granufoam to R ischium.   Secondary dressing: Gauze, abd pad, kerlix, secure with Retention tape to R heel/R ankle/ sacrum/coccyx.  Gauze, abd, secure with retention tape to L trochanter. Transparent film to R ischium. Gauze, abd pad, secure with retention tape to coccyx   Offloading: Offload as much as possible   Edema control: Tubigrip F to RLE   Frequency: QOD to RLE. Daily to L trochanter and coccyx   Follow-up: MD Visit on Tuesday  / Nurse visit Thursday   Other Orders: NPWT @ -125mmHg to R ischium. Apply crushed Metronidazole to R ischium wound bed base prior to Wound vac application.    Recommend admittance to [facility] in order to have IV antibiotics, debridement, Wound Vac changes.  Will discuss with Case Management at hospital and notify you tomorrow of further plan

## 2023-05-05 ENCOUNTER — HOSPITAL ENCOUNTER (OUTPATIENT)
Dept: WOUND CARE | Facility: HOSPITAL | Age: 58
Discharge: HOME | End: 2023-05-05
Attending: NURSE PRACTITIONER
Payer: MEDICARE

## 2023-05-05 VITALS
RESPIRATION RATE: 18 BRPM | TEMPERATURE: 98 F | HEART RATE: 109 BPM | SYSTOLIC BLOOD PRESSURE: 151 MMHG | OXYGEN SATURATION: 99 % | DIASTOLIC BLOOD PRESSURE: 91 MMHG

## 2023-05-05 DIAGNOSIS — L89.44 PRESSURE INJURY OF CONTIGUOUS REGION INVOLVING BACK AND BUTTOCK, STAGE 4, UNSPECIFIED LATERALITY: ICD-10-CM

## 2023-05-05 DIAGNOSIS — L89.314 PRESSURE INJURY OF RIGHT ISCHIUM, STAGE 4: ICD-10-CM

## 2023-05-05 DIAGNOSIS — L89.224 PRESSURE INJURY OF TROCHANTERIC REGION OF LEFT HIP, STAGE 4: Primary | ICD-10-CM

## 2023-05-05 DIAGNOSIS — L89.613 PRESSURE INJURY OF RIGHT HEEL, STAGE 3: ICD-10-CM

## 2023-05-05 LAB
ALBUMIN SERPL-MCNC: 2.9 G/DL (ref 3.5–5)
ALBUMIN/GLOB SERPL: 0.6 RATIO (ref 1.1–2)
ALP SERPL-CCNC: 111 UNIT/L (ref 40–150)
ALT SERPL-CCNC: 10 UNIT/L (ref 0–55)
AST SERPL-CCNC: 7 UNIT/L (ref 5–34)
BASOPHILS # BLD AUTO: 0.01 X10(3)/MCL
BASOPHILS NFR BLD AUTO: 0.1 %
BILIRUBIN DIRECT+TOT PNL SERPL-MCNC: 0.3 MG/DL
BUN SERPL-MCNC: 33.5 MG/DL (ref 8.4–25.7)
CALCIUM SERPL-MCNC: 9.4 MG/DL (ref 8.4–10.2)
CHLORIDE SERPL-SCNC: 103 MMOL/L (ref 98–107)
CO2 SERPL-SCNC: 21 MMOL/L (ref 22–29)
CREAT SERPL-MCNC: 1.8 MG/DL (ref 0.73–1.18)
CRP SERPL-MCNC: 12.7 MG/L
EOSINOPHIL # BLD AUTO: 0.14 X10(3)/MCL (ref 0–0.9)
EOSINOPHIL NFR BLD AUTO: 1.2 %
ERYTHROCYTE [DISTWIDTH] IN BLOOD BY AUTOMATED COUNT: 24.6 % (ref 11.5–17)
ERYTHROCYTE [SEDIMENTATION RATE] IN BLOOD: 67 MM/HR (ref 0–15)
GFR SERPLBLD CREATININE-BSD FMLA CKD-EPI: 44 MLS/MIN/1.73/M2
GLOBULIN SER-MCNC: 5.2 GM/DL (ref 2.4–3.5)
GLUCOSE SERPL-MCNC: 149 MG/DL (ref 74–100)
GROUP & RH: NORMAL
HCT VFR BLD AUTO: 28.5 % (ref 42–52)
HGB BLD-MCNC: 7.9 G/DL (ref 14–18)
IMM GRANULOCYTES # BLD AUTO: 0.04 X10(3)/MCL (ref 0–0.04)
IMM GRANULOCYTES NFR BLD AUTO: 0.3 %
INDIRECT COOMBS GEL: NORMAL
LYMPHOCYTES # BLD AUTO: 1.25 X10(3)/MCL (ref 0.6–4.6)
LYMPHOCYTES NFR BLD AUTO: 10.9 %
MCH RBC QN AUTO: 19.2 PG (ref 27–31)
MCHC RBC AUTO-ENTMCNC: 27.7 G/DL (ref 33–36)
MCV RBC AUTO: 69.3 FL (ref 80–94)
MONOCYTES # BLD AUTO: 0.51 X10(3)/MCL (ref 0.1–1.3)
MONOCYTES NFR BLD AUTO: 4.5 %
NEUTROPHILS # BLD AUTO: 9.48 X10(3)/MCL (ref 2.1–9.2)
NEUTROPHILS NFR BLD AUTO: 83 %
PLATELET # BLD AUTO: 375 X10(3)/MCL (ref 130–400)
PMV BLD AUTO: 9.3 FL (ref 7.4–10.4)
POTASSIUM SERPL-SCNC: 4.7 MMOL/L (ref 3.5–5.1)
PREALB SERPL-MCNC: 14.9 MG/DL (ref 18–45)
PROT SERPL-MCNC: 8.1 GM/DL (ref 6.4–8.3)
RBC # BLD AUTO: 4.11 X10(6)/MCL (ref 4.7–6.1)
SODIUM SERPL-SCNC: 135 MMOL/L (ref 136–145)
SPECIMEN OUTDATE: NORMAL
WBC # SPEC AUTO: 11.43 X10(3)/MCL (ref 4.5–11.5)

## 2023-05-05 PROCEDURE — 97606 NEG PRS WND THER DME>50 SQCM: CPT

## 2023-05-05 NOTE — PROGRESS NOTES
[facility]      Subjective:     Patient seen in clinic today.  Dressing changed as ordered.      Assessment:          Negative Pressure Wound Therapy  03/20/23 1500 Right (Active)   03/20/23 1500   Side: Right   Orientation:    Location: Ischial tuberosity   Additional Comments:    Removal Indication and Assessment:    Location:    SDO Location:    NPWT Type Vacuum Therapy 05/05/23 1339   Therapy Setting NPWT Continuous therapy 05/05/23 1339   Pressure Setting NPWT 125 mmHg 05/05/23 1339   Therapy Interventions NPWT Canister changed;Dressing changed;Seal intact 05/05/23 1339   Sponges Inserted NPWT Black;2 05/05/23 1339   Sponges Removed NPWT Black;2 05/05/23 1339            Altered Skin Integrity 01/12/23 1532 Right Ischial tuberosity Full thickness tissue loss with exposed bone, tendon, or muscle. Often includes undermining and tunneling. May extend into muscle and/or supporting structures. (Active)   01/12/23 1532   Altered Skin Integrity Present on Admission - Did Patient arrive to the hospital with altered skin?:    Side: Right   Orientation:    Location: Ischial tuberosity   Wound Number:    Is this injury device related?:    Primary Wound Type:    Description of Altered Skin Integrity: Full thickness tissue loss with exposed bone, tendon, or muscle. Often includes undermining and tunneling. May extend into muscle and/or supporting structures.   Ankle-Brachial Index:    Pulses:    Removal Indication and Assessment:    Wound Outcome:    (Retired) Wound Length (cm):    (Retired) Wound Width (cm):    (Retired) Depth (cm):    Wound Description (Comments):    Removal Indications:    Wound Image   05/05/23 1339   Dressing Appearance Intact;Moist drainage 05/05/23 1339   Drainage Amount Moderate 05/05/23 1339   Drainage Characteristics/Odor Serosanguineous 05/05/23 1339   Appearance Red;Granulating;Tan;White;Gray;Black;Slough;Necrotic 05/05/23 1339   Tissue loss description Full thickness 05/05/23 1339    Red (%), Wound Tissue Color 50 % 05/05/23 1339   Yellow (%), Wound Tissue Color 50 % 05/05/23 1339   Periwound Area Denuded 05/05/23 1339   Wound Edges Defined 05/05/23 1339   Wound Length (cm) 12.5 cm 05/05/23 1339   Wound Width (cm) 15 cm 05/05/23 1339   Wound Depth (cm) 3.5 cm 05/05/23 1339   Wound Volume (cm^3) 656.25 cm^3 05/05/23 1339   Wound Surface Area (cm^2) 187.5 cm^2 05/05/23 1339   Care Cleansed with:;Antimicrobial agent 05/05/23 1339   Dressing Applied;Other (comment) 05/05/23 1339   Periwound Care Skin barrier film applied;Hydrocolloid barrier applied 05/05/23 1339         ICD-10-CM ICD-9-CM   1. 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 Pressure injury of right heel, stage 3  L89.613 707.07     707.23   3. 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, stage 4, unspecified laterality  L89.44 707.09     707.24   4. Pressure injury of right ischium, stage 4  L89.314 707.05     707.24         Plan:   NPWT dressing changed in wound center today.  Spoke to Dr. Jean via telephone.  Verified instructions for pt not to hold xarelto at this time.  Outpatient labs drawn today in anticipation of admit to [facility] on Monday 5/8.   Discussed with pt and family.  All verbalized understanding.       Orders Placed This Encounter   Procedures    CBC Auto Differential     Standing Status:   Future     Number of Occurrences:   1     Standing Expiration Date:   7/3/2024    Comprehensive Metabolic Panel     Standing Status:   Future     Number of Occurrences:   1     Standing Expiration Date:   7/3/2024    Sedimentation rate     Standing Status:   Future     Number of Occurrences:   1     Standing Expiration Date:   7/3/2024    C-Reactive Protein     Standing Status:   Future     Number of Occurrences:   1     Standing Expiration Date:   7/3/2024    Prealbumin     Standing Status:   Future     Number of Occurrences:   1     Standing Expiration Date:    7/3/2024    Type & Screen     Standing Status:   Future     Number of Occurrences:   1     Standing Expiration Date:   7/3/2024

## 2023-05-05 NOTE — PATIENT INSTRUCTIONS
Cleanse with Vashe   Skin prep periwounds; Hydrocolloid barrier to R ischium periwound   Primary dressing: Aquacel Ag or Opticel Ag to R heel. Santyl, Mesalt to L trochanter and sacrum.  Apply Topical antibiotic compound previously received from [facility] then Santyl/Mesalt.  Santyl and Black Granufoam to R ischium.   Secondary dressing: Gauze, abd pad, kerlix, secure with Retention tape to R heel/R ankle/ sacrum/coccyx.  Gauze, abd, secure with retention tape to L trochanter. Transparent film to R ischium. Gauze, abd pad, secure with retention tape to coccyx   Offloading: Offload as much as possible   Edema control: Tubigrip F to RLE   Frequency: QOD to RLE. Daily to L trochanter and coccyx   Follow-up: MD Visit on Tuesday  / Nurse visit Thursday   Other Orders: NPWT @ -125mmHg to R ischium. Apply crushed Metronidazole to R ischium wound bed base prior to Wound vac application.

## 2023-05-08 ENCOUNTER — ANESTHESIA EVENT (OUTPATIENT)
Dept: SURGERY | Facility: HOSPITAL | Age: 58
DRG: 592 | End: 2023-05-08
Payer: MEDICARE

## 2023-05-08 ENCOUNTER — HOSPITAL ENCOUNTER (INPATIENT)
Facility: HOSPITAL | Age: 58
LOS: 4 days | Discharge: NURSING, REHAB OR LONG-TERM CARE | DRG: 592 | End: 2023-05-12
Attending: FAMILY MEDICINE | Admitting: FAMILY MEDICINE
Payer: MEDICARE

## 2023-05-08 DIAGNOSIS — R00.0 TACHYCARDIA: ICD-10-CM

## 2023-05-08 DIAGNOSIS — L89.314 PRESSURE INJURY OF RIGHT ISCHIUM, STAGE 4: Primary | ICD-10-CM

## 2023-05-08 DIAGNOSIS — L89.304: ICD-10-CM

## 2023-05-08 DIAGNOSIS — R07.9 CHEST PAIN: ICD-10-CM

## 2023-05-08 DIAGNOSIS — L89.224: ICD-10-CM

## 2023-05-08 LAB
ABO + RH BLD: NORMAL
ANION GAP SERPL CALC-SCNC: 7 MEQ/L
ANISOCYTOSIS BLD QL SMEAR: ABNORMAL
APPEARANCE UR: ABNORMAL
BACTERIA #/AREA URNS AUTO: ABNORMAL /HPF
BASOPHILS # BLD AUTO: 0.01 X10(3)/MCL
BASOPHILS NFR BLD AUTO: 0.1 %
BILIRUB UR QL STRIP.AUTO: NEGATIVE MG/DL
BLD PROD TYP BPU: NORMAL
BLOOD UNIT EXPIRATION DATE: NORMAL
BLOOD UNIT TYPE CODE: 7300
BUN SERPL-MCNC: 36.8 MG/DL (ref 8.4–25.7)
CALCIUM SERPL-MCNC: 8.2 MG/DL (ref 8.4–10.2)
CHLORIDE SERPL-SCNC: 102 MMOL/L (ref 98–107)
CO2 SERPL-SCNC: 24 MMOL/L (ref 22–29)
COLOR UR AUTO: ABNORMAL
CREAT SERPL-MCNC: 1.82 MG/DL (ref 0.73–1.18)
CREAT/UREA NIT SERPL: 20
CROSSMATCH INTERPRETATION: NORMAL
DISPENSE STATUS: NORMAL
EOSINOPHIL # BLD AUTO: 0.13 X10(3)/MCL (ref 0–0.9)
EOSINOPHIL NFR BLD AUTO: 1.6 %
ERYTHROCYTE [DISTWIDTH] IN BLOOD BY AUTOMATED COUNT: 24 % (ref 11.5–17)
EST. AVERAGE GLUCOSE BLD GHB EST-MCNC: 151.3 MG/DL
GFR SERPLBLD CREATININE-BSD FMLA CKD-EPI: 43 MLS/MIN/1.73/M2
GLUCOSE SERPL-MCNC: 128 MG/DL (ref 74–100)
GLUCOSE UR QL STRIP.AUTO: NEGATIVE MG/DL
HBA1C MFR BLD: 6.9 %
HCT VFR BLD AUTO: 19.7 % (ref 42–52)
HGB BLD-MCNC: 5.5 G/DL (ref 14–18)
HYPOCHROMIA BLD QL SMEAR: ABNORMAL
IMM GRANULOCYTES # BLD AUTO: 0.02 X10(3)/MCL (ref 0–0.04)
IMM GRANULOCYTES NFR BLD AUTO: 0.3 %
KETONES UR QL STRIP.AUTO: NEGATIVE MG/DL
LEUKOCYTE ESTERASE UR QL STRIP.AUTO: ABNORMAL UNIT/L
LYMPHOCYTES # BLD AUTO: 1.4 X10(3)/MCL (ref 0.6–4.6)
LYMPHOCYTES NFR BLD AUTO: 17.7 %
MCH RBC QN AUTO: 19.1 PG (ref 27–31)
MCHC RBC AUTO-ENTMCNC: 27.9 G/DL (ref 33–36)
MCV RBC AUTO: 68.4 FL (ref 80–94)
MICROCYTES BLD QL SMEAR: ABNORMAL
MONOCYTES # BLD AUTO: 0.51 X10(3)/MCL (ref 0.1–1.3)
MONOCYTES NFR BLD AUTO: 6.5 %
NEUTROPHILS # BLD AUTO: 5.82 X10(3)/MCL (ref 2.1–9.2)
NEUTROPHILS NFR BLD AUTO: 73.8 %
NITRITE UR QL STRIP.AUTO: NEGATIVE
PH UR STRIP.AUTO: 5.5 [PH]
PLATELET # BLD AUTO: 297 X10(3)/MCL (ref 130–400)
PLATELET # BLD EST: NORMAL 10*3/UL
PMV BLD AUTO: 9.2 FL (ref 7.4–10.4)
POCT GLUCOSE: 170 MG/DL (ref 70–110)
POTASSIUM SERPL-SCNC: 4.7 MMOL/L (ref 3.5–5.1)
PROT UR QL STRIP.AUTO: 100 MG/DL
RBC # BLD AUTO: 2.88 X10(6)/MCL (ref 4.7–6.1)
RBC #/AREA URNS AUTO: ABNORMAL /HPF
RBC MORPH BLD: ABNORMAL
RBC UR QL AUTO: ABNORMAL UNIT/L
SODIUM SERPL-SCNC: 133 MMOL/L (ref 136–145)
SP GR UR STRIP.AUTO: >=1.03
SQUAMOUS #/AREA URNS AUTO: ABNORMAL /HPF
UNIT NUMBER: NORMAL
UROBILINOGEN UR STRIP-ACNC: 0.2 MG/DL
WBC # SPEC AUTO: 7.89 X10(3)/MCL (ref 4.5–11.5)
WBC #/AREA URNS AUTO: >100 /HPF
YEAST URNS QL MICRO: ABNORMAL /HPF

## 2023-05-08 PROCEDURE — 86920 COMPATIBILITY TEST SPIN: CPT | Performed by: STUDENT IN AN ORGANIZED HEALTH CARE EDUCATION/TRAINING PROGRAM

## 2023-05-08 PROCEDURE — P9016 RBC LEUKOCYTES REDUCED: HCPCS | Performed by: FAMILY MEDICINE

## 2023-05-08 PROCEDURE — 36430 TRANSFUSION BLD/BLD COMPNT: CPT

## 2023-05-08 PROCEDURE — 86920 COMPATIBILITY TEST SPIN: CPT | Performed by: FAMILY MEDICINE

## 2023-05-08 PROCEDURE — 99900031 HC PATIENT EDUCATION (STAT)

## 2023-05-08 PROCEDURE — 25000003 PHARM REV CODE 250: Performed by: FAMILY MEDICINE

## 2023-05-08 PROCEDURE — 36569 INSJ PICC 5 YR+ W/O IMAGING: CPT

## 2023-05-08 PROCEDURE — 80048 BASIC METABOLIC PNL TOTAL CA: CPT | Performed by: FAMILY MEDICINE

## 2023-05-08 PROCEDURE — 86900 BLOOD TYPING SEROLOGIC ABO: CPT | Mod: 91 | Performed by: STUDENT IN AN ORGANIZED HEALTH CARE EDUCATION/TRAINING PROGRAM

## 2023-05-08 PROCEDURE — 83036 HEMOGLOBIN GLYCOSYLATED A1C: CPT | Performed by: FAMILY MEDICINE

## 2023-05-08 PROCEDURE — 87077 CULTURE AEROBIC IDENTIFY: CPT | Performed by: FAMILY MEDICINE

## 2023-05-08 PROCEDURE — 63600175 PHARM REV CODE 636 W HCPCS: Performed by: FAMILY MEDICINE

## 2023-05-08 PROCEDURE — 81001 URINALYSIS AUTO W/SCOPE: CPT | Performed by: FAMILY MEDICINE

## 2023-05-08 PROCEDURE — 85025 COMPLETE CBC W/AUTO DIFF WBC: CPT | Performed by: FAMILY MEDICINE

## 2023-05-08 PROCEDURE — 86900 BLOOD TYPING SEROLOGIC ABO: CPT | Performed by: FAMILY MEDICINE

## 2023-05-08 PROCEDURE — 94640 AIRWAY INHALATION TREATMENT: CPT

## 2023-05-08 PROCEDURE — C1751 CATH, INF, PER/CENT/MIDLINE: HCPCS

## 2023-05-08 PROCEDURE — 25000242 PHARM REV CODE 250 ALT 637 W/ HCPCS: Performed by: FAMILY MEDICINE

## 2023-05-08 PROCEDURE — 11000001 HC ACUTE MED/SURG PRIVATE ROOM

## 2023-05-08 PROCEDURE — 94760 N-INVAS EAR/PLS OXIMETRY 1: CPT

## 2023-05-08 PROCEDURE — 87040 BLOOD CULTURE FOR BACTERIA: CPT | Performed by: FAMILY MEDICINE

## 2023-05-08 RX ORDER — MEPERIDINE HYDROCHLORIDE 25 MG/ML
12.5 INJECTION INTRAMUSCULAR; INTRAVENOUS; SUBCUTANEOUS ONCE AS NEEDED
Status: CANCELLED | OUTPATIENT
Start: 2023-05-08 | End: 2023-05-09

## 2023-05-08 RX ORDER — CARVEDILOL 12.5 MG/1
12.5 TABLET ORAL 2 TIMES DAILY
Status: DISCONTINUED | OUTPATIENT
Start: 2023-05-08 | End: 2023-05-08

## 2023-05-08 RX ORDER — DOXYLAMINE SUCCINATE 25 MG
TABLET ORAL EVERY 12 HOURS PRN
Status: DISCONTINUED | OUTPATIENT
Start: 2023-05-08 | End: 2023-05-12 | Stop reason: HOSPADM

## 2023-05-08 RX ORDER — SODIUM CHLORIDE 9 MG/ML
INJECTION, SOLUTION INTRAVENOUS CONTINUOUS
Status: DISCONTINUED | OUTPATIENT
Start: 2023-05-08 | End: 2023-05-09

## 2023-05-08 RX ORDER — ACETAMINOPHEN 325 MG/1
650 TABLET ORAL EVERY 4 HOURS PRN
Status: DISCONTINUED | OUTPATIENT
Start: 2023-05-08 | End: 2023-05-09

## 2023-05-08 RX ORDER — SODIUM CHLORIDE 0.9 % (FLUSH) 0.9 %
10 SYRINGE (ML) INJECTION EVERY 6 HOURS
Status: DISCONTINUED | OUTPATIENT
Start: 2023-05-09 | End: 2023-05-12 | Stop reason: HOSPADM

## 2023-05-08 RX ORDER — CARVEDILOL 12.5 MG/1
12.5 TABLET ORAL DAILY
Status: DISCONTINUED | OUTPATIENT
Start: 2023-05-09 | End: 2023-05-12 | Stop reason: HOSPADM

## 2023-05-08 RX ORDER — SODIUM CHLORIDE 9 MG/ML
INJECTION, SOLUTION INTRAVENOUS CONTINUOUS
Status: CANCELLED | OUTPATIENT
Start: 2023-05-08

## 2023-05-08 RX ORDER — NALOXONE HCL 0.4 MG/ML
0.02 VIAL (ML) INJECTION
Status: DISCONTINUED | OUTPATIENT
Start: 2023-05-08 | End: 2023-05-12 | Stop reason: HOSPADM

## 2023-05-08 RX ORDER — DIPHENHYDRAMINE HYDROCHLORIDE 50 MG/ML
12.5 INJECTION INTRAMUSCULAR; INTRAVENOUS ONCE AS NEEDED
Status: CANCELLED | OUTPATIENT
Start: 2023-05-08 | End: 2034-10-04

## 2023-05-08 RX ORDER — HYDROCODONE BITARTRATE AND ACETAMINOPHEN 500; 5 MG/1; MG/1
TABLET ORAL
Status: DISCONTINUED | OUTPATIENT
Start: 2023-05-08 | End: 2023-05-12 | Stop reason: HOSPADM

## 2023-05-08 RX ORDER — ONDANSETRON 2 MG/ML
4 INJECTION INTRAMUSCULAR; INTRAVENOUS DAILY PRN
Status: CANCELLED | OUTPATIENT
Start: 2023-05-08

## 2023-05-08 RX ORDER — HYDROCODONE BITARTRATE AND ACETAMINOPHEN 5; 325 MG/1; MG/1
1 TABLET ORAL
Status: CANCELLED | OUTPATIENT
Start: 2023-05-08

## 2023-05-08 RX ORDER — MUPIROCIN 20 MG/G
OINTMENT TOPICAL 2 TIMES DAILY
Status: DISCONTINUED | OUTPATIENT
Start: 2023-05-08 | End: 2023-05-09

## 2023-05-08 RX ORDER — SODIUM CHLORIDE 0.9 % (FLUSH) 0.9 %
10 SYRINGE (ML) INJECTION EVERY 12 HOURS PRN
Status: DISCONTINUED | OUTPATIENT
Start: 2023-05-08 | End: 2023-05-12 | Stop reason: HOSPADM

## 2023-05-08 RX ORDER — BUDESONIDE 0.5 MG/2ML
1 INHALANT ORAL DAILY
Status: DISCONTINUED | OUTPATIENT
Start: 2023-05-08 | End: 2023-05-09

## 2023-05-08 RX ORDER — IBUPROFEN 200 MG
24 TABLET ORAL
Status: DISCONTINUED | OUTPATIENT
Start: 2023-05-08 | End: 2023-05-09

## 2023-05-08 RX ORDER — HYDROCODONE BITARTRATE AND ACETAMINOPHEN 500; 5 MG/1; MG/1
TABLET ORAL
Status: DISCONTINUED | OUTPATIENT
Start: 2023-05-08 | End: 2023-05-09

## 2023-05-08 RX ORDER — SODIUM CHLORIDE 0.9 % (FLUSH) 0.9 %
10 SYRINGE (ML) INJECTION
Status: DISCONTINUED | OUTPATIENT
Start: 2023-05-08 | End: 2023-05-09

## 2023-05-08 RX ORDER — ONDANSETRON 2 MG/ML
4 INJECTION INTRAMUSCULAR; INTRAVENOUS EVERY 6 HOURS PRN
Status: DISCONTINUED | OUTPATIENT
Start: 2023-05-08 | End: 2023-05-09

## 2023-05-08 RX ORDER — IBUPROFEN 200 MG
16 TABLET ORAL
Status: DISCONTINUED | OUTPATIENT
Start: 2023-05-08 | End: 2023-05-09

## 2023-05-08 RX ORDER — FENTANYL CITRATE 50 UG/ML
25 INJECTION, SOLUTION INTRAMUSCULAR; INTRAVENOUS EVERY 5 MIN PRN
Status: CANCELLED | OUTPATIENT
Start: 2023-05-08

## 2023-05-08 RX ORDER — INSULIN ASPART 100 [IU]/ML
0-5 INJECTION, SOLUTION INTRAVENOUS; SUBCUTANEOUS
Status: DISCONTINUED | OUTPATIENT
Start: 2023-05-08 | End: 2023-05-09

## 2023-05-08 RX ORDER — GLUCAGON 1 MG
1 KIT INJECTION
Status: DISCONTINUED | OUTPATIENT
Start: 2023-05-08 | End: 2023-05-09

## 2023-05-08 RX ADMIN — BUDESONIDE INHALATION 0.5 MG: 0.5 SUSPENSION RESPIRATORY (INHALATION) at 01:05

## 2023-05-08 RX ADMIN — PIPERACILLIN SODIUM AND TAZOBACTAM SODIUM 4.5 G: 4; .5 INJECTION, POWDER, LYOPHILIZED, FOR SOLUTION INTRAVENOUS at 08:05

## 2023-05-08 RX ADMIN — RIVAROXABAN 20 MG: 20 TABLET, FILM COATED ORAL at 04:05

## 2023-05-08 RX ADMIN — COLLAGENASE SANTYL: 250 OINTMENT TOPICAL at 04:05

## 2023-05-08 RX ADMIN — MUPIROCIN: 20 OINTMENT TOPICAL at 10:05

## 2023-05-08 NOTE — H&P
Hospital Medicine  History & Physical Examination       Patient: Antonio Galvan II  MRN: [id number]  STATUS: IP- Inpatient   DOS: 5/8/2023   PCP: Jennifer Fernando NP      CC: right buttock wound debridement        HISTORY OF PRESENT ILLNESS   57 y.o. male with a history that includes DVT, Diabetes, presented to ED with buttcok wound infection. Seen at wound care Friday by surgeon Dr. Jean rec admission to hospital iv abx and OR debdridement. Currently has wound vac to area.      REVIEW OF SYSTEMS   Except as documented, all other systems reviewed and negative       PAST MEDICAL HISTORY     Past Medical History:   Diagnosis Date    A-fib     Cardiac arrest     7/2022    Chronic skin ulcer     DM (diabetes mellitus)     Infected decubitus ulcer     Lymphedema of leg     Neurogenic bladder     Obesity, unspecified     Pressure ulcer of heel     Pressure ulcer of right foot, stage 3     Pressure ulcer of right ischium     Quadriplegia     Quadriplegic spinal paralysis           PAST SURGICAL HISTORY     No past surgical history on file.       FAMILY HISTORY   Reviewed, negative in relation to patient's current condition.      SOCIAL HISTORY     Social History     Tobacco Use    Smoking status: Never    Smokeless tobacco: Never   Substance Use Topics    Alcohol use: Never          ALLERGIES   Patient has no known allergies.      HOME MEDICATIONS     Current Outpatient Medications   Medication Instructions    albuterol (PROVENTIL) 2.5 mg, Inhalation    ALKALOL NASAL WASH Soln 50 mLs, Nasal, Daily    amoxicillin-clavulanate 500-125mg (AUGMENTIN) 500-125 mg Tab 500 mg, Oral, 3 times daily    BASAGLAR KWIKPEN U-100 INSULIN 70 Units, Subcutaneous, Nightly    budesonide (PULMICORT) 0.5 mg/2 mL nebulizer solution 1 ampule, Nebulization, Daily    carvediloL (COREG) 12.5 MG tablet No dose, route, or frequency recorded.    desonide 0.05% (DESOWEN) 0.05 % Oint Topical (Top)    EScitalopram oxalate (LEXAPRO) 5 mg, Oral, Daily     fluticasone propionate (FLONASE) 50 mcg/actuation nasal spray 2 sprays, Each Nostril, Daily    ipratropium (ATROVENT) 21 mcg (0.03 %) nasal spray Each Nostril    JANUVIA 100 mg, Oral, Daily    ketoconazole (NIZORAL) 2 % cream Apply 1 application on the skin twice a day as needed    ketoconazole (NIZORAL) 2 % shampoo Apply 1 application to the scalp 3 times weekly; Let sit for 10 minutes then rinse    metroNIDAZOLE (FLAGYL) 500 mg, Oral, See admin instructions    MYRBETRIQ 50 mg Tb24 1 tablet, Oral, Daily    NovoLIN R Regular U-100 Insuln 55 Units, Subcutaneous, 3 times daily before meals    sodium chloride 3% 3 % nebulizer solution Inhale contents of one vial (4 mL) by nebulization every 4 (four) hours as needed (Thick secretions).    XARELTO 20 mg, Oral          PHYSICAL EXAM   VITALS: T     BP     P     RR     O2      GENERAL: Awake and in NAD  HEENT: NC/AT, EOMI, PERRL.  NECK: Supple,  No JVD  LUNGS: CTA B/L  CVS: RRR, S1S2 positive  GI/: Soft, NT/ND, bowel sounds positive.  EXTREMITIES: Peripheral pulses 2+, no peripheral edema  DERM: Warm, dry.  No rashes or lesions noted. Right buttuck wound with vac in place  Left hip wound   NEURO: AAOx3, no focal neurologic deficit  PSYCH: Cooperative, appropriate mood and affect       DIAGNOSTICS     Recent Labs     05/05/23  1343   WBC 11.43   RBC 4.11*   HGB 7.9*   HCT 28.5*   MCV 69.3*   MCH 19.2*   MCHC 27.7*   RDW 24.6*        No results for input(s): LACTIC in the last 72 hours.  No results for input(s): INR, APTT, D-DIMER in the last 72 hours.  No results for input(s): HGBA1C, CHOL, TRIG, LDL, VLDL, HDL in the last 72 hours.   Recent Labs     05/05/23  1343   *   K 4.7   CHLORIDE 103   CO2 21*   BUN 33.5*   CREATININE 1.80*   GLUCOSE 149*   CALCIUM 9.4   ALBUMIN 2.9*   GLOBULIN 5.2*   ALKPHOS 111   ALT 10   AST 7   BILITOT 0.3   CRP 12.70*     No results for input(s): BNP, CPK, TROPONINI in the last 72 hours.       CV Ultrasound doppler venous arm  right  A chronic superficial vein thrombosis was identified in the right arm   basilic vein in the mid upper arm just before the conjunction with the   brachial vein. All other vessels were patent with full compressibility and   augmentation.        ASSESSMENT   Right buttock pressure ulcer infection, present on admission   Left hip pressure ulcer, present on admission   H/O DVT    PLAN   Iv Zosyn  Transfuse 1 U pRBC  NPO midnight  Consult surgery  Plan OR debridement malini both areas   Blood cultures pending   Gentle ivf      Prophylaxis: xarelto  Code Status: full code       Nilton Ortega MD  Hospital Medicine        If the patient has been admitted under observation status, it is at my discretion and under our care with hospital medicine services. [TWA]

## 2023-05-08 NOTE — PROGRESS NOTES
[facility] - Medical Surgical Unit  Wound Care    Patient Name:  Antonio Galvan II   MRN:  [id number]  Date: 5/8/2023  Diagnosis: <principal problem not specified>    History:     Past Medical History:   Diagnosis Date    A-fib     Cardiac arrest     7/2022    Chronic skin ulcer     DM (diabetes mellitus)     Infected decubitus ulcer     Lymphedema of leg     Neurogenic bladder     Obesity, unspecified     Pressure ulcer of heel     Pressure ulcer of right foot, stage 3     Pressure ulcer of right ischium     Quadriplegia     Quadriplegic spinal paralysis        Social History     Socioeconomic History    Marital status: Single   Tobacco Use    Smoking status: Never    Smokeless tobacco: Never   Substance and Sexual Activity    Alcohol use: Never    Drug use: Never    Sexual activity: Not Currently       Precautions:     Allergies as of 05/08/2023    (No Known Allergies)       WOC Assessment Details/Treatment     Wound care assessment performed.   Pt known to me at [facility] admitted for multiple infected stg 4 ulceration, surgical debridement in OR scheduled for tomorrow with Dr. Jean, IV antibiotics initiated.  Specialty bed ordered and in use.  All pressure prevention measures initiated.   Due to anemia, 1 unit of blood is planned for today prior to surgical intervention.    Wound care orders discussed with Dr. Ortega and will be carried over from outpatient wound care center.  Discussed healing principles, pressure prevention measures with patient and family at bedside.  All verbalized understanding.   (       05/08/23 1618        Altered Skin Integrity Left Greater trochanter Full thickness tissue loss. Base is covered by slough and/or eschar in the wound bed   No Date First Assessed or Time First Assessed found.   Altered Skin Integrity Present on Admission - Did Patient arrive to the hospital with altered skin?: yes  Side: Left  Location: Greater trochanter  Description of  Altered Skin Integrity: Full thic...   Wound Image    Description of Altered Skin Integrity Full thickness tissue loss with exposed bone, tendon, or muscle. Often includes undermining and tunneling. May extend into muscle and/or supporting structures.   Dressing Appearance Intact;Moist drainage   Drainage Amount Moderate   Drainage Characteristics/Odor Tan;Creamy;No odor;Bleeding controlled   Appearance Red;Purple;Granulating;Gray;Tan;White;Yellow;Slough   Tissue loss description Full thickness   Red (%), Wound Tissue Color 60 %   Periwound Area Ecchymotic   Wound Edges Defined   Wound Length (cm) 4 cm   Wound Width (cm) 5 cm   Wound Depth (cm) 2.5 cm   Wound Volume (cm^3) 50 cm^3   Wound Surface Area (cm^2) 20 cm^2   Undermining (depth (cm)/location) 7-9 o'clock / 5cm at 9 o'clock   Care Cleansed with:;Antimicrobial agent   Dressing Applied;Sodium chloride impregnated;Gauze;Absorptive Pad;Other (comment)  (santyl, mefix)   Packing packed with;other (see comment)  (mesalt)   Dressing Change Due 05/09/23        Altered Skin Integrity 05/06/22 1140 Right Heel  Full thickness tissue loss. Subcutaneous fat may be visible but bone, tendon or muscle are not exposed   Date First Assessed/Time First Assessed: 05/06/22 1140   Altered Skin Integrity Present on Admission: yes  Side: Right  Location: Heel  Is this injury device related?: No  Primary Wound Type: (c)   Description of Altered Skin Integrity: Full thickness...   Description of Altered Skin Integrity Full thickness tissue loss. Subcutaneous fat may be visible but bone, tendon or muscle are not exposed   Dressing Appearance Intact;Moist drainage   Drainage Amount Moderate   Drainage Characteristics/Odor Serosanguineous;No odor;Bleeding controlled   Appearance Red;Granulating   Tissue loss description Full thickness   Red (%), Wound Tissue Color 100 %   Periwound Area Intact;Scar tissue   Wound Edges Defined   Wound Length (cm) 5 cm   Wound Width (cm) 2.3 cm   Wound  Depth (cm) 0.1 cm   Wound Volume (cm^3) 1.15 cm^3   Wound Surface Area (cm^2) 11.5 cm^2   Care Cleansed with:;Antimicrobial agent   Dressing Applied;Hydrocolloid;Silver;Gauze;Absorptive Pad;Rolled gauze;Tubular bandage   Off Loading Other (see comments)   Dressing Change Due 05/09/23        Altered Skin Integrity 01/12/23 1530 Sacral spine Full thickness tissue loss with exposed bone, tendon, or muscle. Often includes undermining and tunneling. May extend into muscle and/or supporting structures.   Date First Assessed/Time First Assessed: 01/12/23 1530   Altered Skin Integrity Present on Admission - Did Patient arrive to the hospital with altered skin?: yes  Location: Sacral spine  Description of Altered Skin Integrity: Full thickness tissue los...   Wound Image    Description of Altered Skin Integrity Full thickness tissue loss. Subcutaneous fat may be visible but bone, tendon or muscle are not exposed   Drainage Amount Moderate   Appearance Red;Granulating;Maroon;Purple;Ecchymotic;White;Fibrin;Slough   Tissue loss description Full thickness   Red (%), Wound Tissue Color 75 %   Periwound Area Denuded;Scar tissue   Wound Edges Irregular   Wound Length (cm) 6 cm   Wound Width (cm) 2.5 cm   Wound Depth (cm) 1 cm   Wound Volume (cm^3) 15 cm^3   Wound Surface Area (cm^2) 15 cm^2   Care Cleansed with:;Antimicrobial agent   Dressing Applied;Sodium chloride impregnated;Gauze;Absorptive Pad;Other (comment)  (santyl, mefix)   Dressing Change Due 05/09/23        Altered Skin Integrity 01/12/23 1532 Right Ischial tuberosity Full thickness tissue loss with exposed bone, tendon, or muscle. Often includes undermining and tunneling. May extend into muscle and/or supporting structures.   Date First Assessed/Time First Assessed: 01/12/23 1532   Altered Skin Integrity Present on Admission - Did Patient arrive to the hospital with altered skin?: yes  Side: Right  Location: Ischial tuberosity  Description of Altered Skin Integrity:  Full t...   Wound Image    Description of Altered Skin Integrity Full thickness tissue loss with exposed bone, tendon, or muscle. Often includes undermining and tunneling. May extend into muscle and/or supporting structures.   Dressing Appearance Intact;Dry;Clean   Appearance Dressing in place, unable to visualize  (NPWT intact, will be removed tomorrow for surgical intervention)        Altered Skin Integrity 02/09/23 1324 Right anterior Ankle Ulceration Full thickness tissue loss. Subcutaneous fat may be visible but bone, tendon or muscle are not exposed   Date First Assessed/Time First Assessed: 02/09/23 1324   Altered Skin Integrity Present on Admission: yes  Side: Right  Orientation: anterior  Location: Ankle  Primary Wound Type: Ulceration  Description of Altered Skin Integrity: Full thickness tissu...   Wound Image    Description of Altered Skin Integrity Full thickness tissue loss. Subcutaneous fat may be visible but bone, tendon or muscle are not exposed  (hx of stg 3 to area)   Dressing Appearance Intact;Dried drainage   Drainage Amount Small   Drainage Characteristics/Odor Sanguineous;No odor;Bleeding controlled   Appearance Red;Not granulating   Tissue loss description Full thickness   Red (%), Wound Tissue Color 100 %   Periwound Area Intact   Wound Edges Defined   Wound Length (cm) 1 cm   Wound Width (cm) 1 cm   Wound Depth (cm) 0.1 cm   Wound Volume (cm^3) 0.1 cm^3   Wound Surface Area (cm^2) 1 cm^2   Care Cleansed with:;Antimicrobial agent   Dressing Applied;Hydrofiber;Silver;Gauze;Rolled gauze;Tubular bandage   Dressing Change Due 05/11/23        Altered Skin Integrity 05/08/23 1639 Left Ischial tuberosity Partial thickness tissue loss. Shallow open ulcer with a red or pink wound bed, without slough. Intact or Open/Ruptured Serum-filled blister.   Date First Assessed/Time First Assessed: 05/08/23 1639   Altered Skin Integrity Present on Admission - Did Patient arrive to the hospital with altered  skin?: yes  Side: Left  Location: Ischial tuberosity  Is this injury device related?: No  Descriptio...   Wound Image    Description of Altered Skin Integrity Partial thickness tissue loss. Shallow open ulcer with a red or pink wound bed, without slough. Intact or Open/Ruptured Serum-filled blister.   Dressing Appearance Intact;Moist drainage   Drainage Amount Small   Drainage Characteristics/Odor Serosanguineous;No odor;Bleeding controlled   Appearance Red;Not granulating   Tissue loss description Partial thickness   Red (%), Wound Tissue Color 100 %   Periwound Area Intact;Moist   Wound Edges Defined   Wound Length (cm) 3 cm   Wound Width (cm) 3.5 cm   Wound Depth (cm) 0.1 cm   Wound Volume (cm^3) 1.05 cm^3   Wound Surface Area (cm^2) 10.5 cm^2   Care Cleansed with:;Antimicrobial agent   Dressing Applied;Sodium chloride impregnated;Gauze;Other (comment)  (mefix)   Dressing Change Due 05/09/23        Negative Pressure Wound Therapy  03/20/23 1500 Right   Placement Date/Time: 03/20/23 1500   Side: Right  Location: Ischial tuberosity   NPWT Type Vacuum Therapy   Therapy Setting NPWT Continuous therapy   Pressure Setting NPWT 125 mmHg   Therapy Interventions NPWT Seal intact  (leave in place until surgical intervention tomorrow)   )    Recommendations made to primary team . Orders placed.     05/08/2023

## 2023-05-08 NOTE — PLAN OF CARE
Problem: Adult Inpatient Plan of Care  Goal: Plan of Care Review  Outcome: Ongoing, Progressing  Flowsheets (Taken 5/8/2023 1827)  Plan of Care Reviewed With:   patient   family   significant other  Goal: Patient-Specific Goal (Individualized)  Outcome: Ongoing, Progressing  Flowsheets (Taken 5/8/2023 1827)  Anxieties, Fears or Concerns: None  Individualized Care Needs: Turned q 2 hrs and wound care from 0700 to 1900  Goal: Absence of Hospital-Acquired Illness or Injury  Outcome: Ongoing, Progressing  Intervention: Identify and Manage Fall Risk  Flowsheets (Taken 5/8/2023 1827)  Safety Promotion/Fall Prevention:   assistive device/personal item within reach   bed alarm set   nonskid shoes/socks when out of bed  Intervention: Prevent Skin Injury  Flowsheets (Taken 5/8/2023 1827)  Body Position:   position maintained   sitting up in bed  Skin Protection:   incontinence pads utilized   transparent dressing maintained   tubing/devices free from skin contact  Intervention: Prevent and Manage VTE (Venous Thromboembolism) Risk  Flowsheets (Taken 5/8/2023 1827)  Activity Management:   Rolling - L1   Up in chair - L3  Range of Motion: ROM (range of motion) performed  Intervention: Prevent Infection  Flowsheets (Taken 5/8/2023 1827)  Infection Prevention:   hand hygiene promoted   rest/sleep promoted   single patient room provided  Goal: Optimal Comfort and Wellbeing  Outcome: Ongoing, Progressing  Intervention: Monitor Pain and Promote Comfort  Flowsheets (Taken 5/8/2023 1827)  Pain Management Interventions:   care clustered   quiet environment facilitated   warm blanket provided  Intervention: Provide Person-Centered Care  Flowsheets (Taken 5/8/2023 1827)  Trust Relationship/Rapport:   care explained   choices provided   emotional support provided   empathic listening provided   questions answered   questions encouraged   reassurance provided   thoughts/feelings acknowledged   other (see comments)  Goal: Readiness for  Transition of Care  Outcome: Ongoing, Progressing     Problem: Diabetes Comorbidity  Goal: Blood Glucose Level Within Targeted Range  Outcome: Ongoing, Progressing     Problem: Adjustment to Illness (Sepsis/Septic Shock)  Goal: Optimal Coping  Outcome: Ongoing, Progressing     Problem: Bleeding (Sepsis/Septic Shock)  Goal: Absence of Bleeding  Outcome: Ongoing, Progressing     Problem: Glycemic Control Impaired (Sepsis/Septic Shock)  Goal: Blood Glucose Level Within Desired Range  Outcome: Ongoing, Progressing     Problem: Infection Progression (Sepsis/Septic Shock)  Goal: Absence of Infection Signs and Symptoms  Outcome: Ongoing, Progressing     Problem: Nutrition Impaired (Sepsis/Septic Shock)  Goal: Optimal Nutrition Intake  Outcome: Ongoing, Progressing     Problem: Impaired Wound Healing  Goal: Optimal Wound Healing  Outcome: Ongoing, Progressing     Problem: Skin Injury Risk Increased  Goal: Skin Health and Integrity  Outcome: Ongoing, Progressing

## 2023-05-09 ENCOUNTER — ANESTHESIA (OUTPATIENT)
Dept: SURGERY | Facility: HOSPITAL | Age: 58
DRG: 592 | End: 2023-05-09
Payer: MEDICARE

## 2023-05-09 LAB
ABO + RH BLD: NORMAL
ABO + RH BLD: NORMAL
ANION GAP SERPL CALC-SCNC: 8 MEQ/L
BASOPHILS # BLD AUTO: 0.01 X10(3)/MCL
BASOPHILS NFR BLD AUTO: 0.1 %
BLD PROD TYP BPU: NORMAL
BLD PROD TYP BPU: NORMAL
BLOOD UNIT EXPIRATION DATE: NORMAL
BLOOD UNIT EXPIRATION DATE: NORMAL
BLOOD UNIT TYPE CODE: 7300
BLOOD UNIT TYPE CODE: 7300
BUN SERPL-MCNC: 36.1 MG/DL (ref 8.4–25.7)
CALCIUM SERPL-MCNC: 8.5 MG/DL (ref 8.4–10.2)
CHLORIDE SERPL-SCNC: 103 MMOL/L (ref 98–107)
CO2 SERPL-SCNC: 23 MMOL/L (ref 22–29)
CREAT SERPL-MCNC: 1.93 MG/DL (ref 0.73–1.18)
CREAT/UREA NIT SERPL: 19
CROSSMATCH INTERPRETATION: NORMAL
CROSSMATCH INTERPRETATION: NORMAL
DISPENSE STATUS: NORMAL
DISPENSE STATUS: NORMAL
EOSINOPHIL # BLD AUTO: 0.14 X10(3)/MCL (ref 0–0.9)
EOSINOPHIL NFR BLD AUTO: 1.9 %
ERYTHROCYTE [DISTWIDTH] IN BLOOD BY AUTOMATED COUNT: 23.2 % (ref 11.5–17)
GFR SERPLBLD CREATININE-BSD FMLA CKD-EPI: 40 MLS/MIN/1.73/M2
GLUCOSE SERPL-MCNC: 150 MG/DL (ref 74–100)
GLUCOSE SERPL-MCNC: 156 MG/DL (ref 70–110)
HCT VFR BLD AUTO: 24.8 % (ref 42–52)
HCT VFR BLD AUTO: 27.3 % (ref 42–52)
HGB BLD-MCNC: 7.4 G/DL (ref 14–18)
HGB BLD-MCNC: 8.2 G/DL (ref 14–18)
IMM GRANULOCYTES # BLD AUTO: 0.02 X10(3)/MCL (ref 0–0.04)
IMM GRANULOCYTES NFR BLD AUTO: 0.3 %
LYMPHOCYTES # BLD AUTO: 1.6 X10(3)/MCL (ref 0.6–4.6)
LYMPHOCYTES NFR BLD AUTO: 21.2 %
MCH RBC QN AUTO: 21.1 PG (ref 27–31)
MCHC RBC AUTO-ENTMCNC: 29.8 G/DL (ref 33–36)
MCV RBC AUTO: 70.7 FL (ref 80–94)
MONOCYTES # BLD AUTO: 0.5 X10(3)/MCL (ref 0.1–1.3)
MONOCYTES NFR BLD AUTO: 6.6 %
NEUTROPHILS # BLD AUTO: 5.28 X10(3)/MCL (ref 2.1–9.2)
NEUTROPHILS NFR BLD AUTO: 69.9 %
PLATELET # BLD AUTO: 272 X10(3)/MCL (ref 130–400)
PMV BLD AUTO: 8.8 FL (ref 7.4–10.4)
POCT GLUCOSE: 156 MG/DL (ref 70–110)
POCT GLUCOSE: 157 MG/DL (ref 70–110)
POCT GLUCOSE: 227 MG/DL (ref 70–110)
POCT GLUCOSE: 238 MG/DL (ref 70–110)
POTASSIUM SERPL-SCNC: 4.3 MMOL/L (ref 3.5–5.1)
RBC # BLD AUTO: 3.51 X10(6)/MCL (ref 4.7–6.1)
SODIUM SERPL-SCNC: 134 MMOL/L (ref 136–145)
UNIT NUMBER: NORMAL
UNIT NUMBER: NORMAL
WBC # SPEC AUTO: 7.55 X10(3)/MCL (ref 4.5–11.5)

## 2023-05-09 PROCEDURE — 25000003 PHARM REV CODE 250: Performed by: SURGERY

## 2023-05-09 PROCEDURE — 25000242 PHARM REV CODE 250 ALT 637 W/ HCPCS: Performed by: FAMILY MEDICINE

## 2023-05-09 PROCEDURE — 85014 HEMATOCRIT: CPT | Performed by: STUDENT IN AN ORGANIZED HEALTH CARE EDUCATION/TRAINING PROGRAM

## 2023-05-09 PROCEDURE — 93010 EKG 12-LEAD: ICD-10-PCS | Mod: ,,, | Performed by: INTERNAL MEDICINE

## 2023-05-09 PROCEDURE — 93010 ELECTROCARDIOGRAM REPORT: CPT | Mod: ,,, | Performed by: INTERNAL MEDICINE

## 2023-05-09 PROCEDURE — 36430 TRANSFUSION BLD/BLD COMPNT: CPT

## 2023-05-09 PROCEDURE — 25000003 PHARM REV CODE 250: Performed by: STUDENT IN AN ORGANIZED HEALTH CARE EDUCATION/TRAINING PROGRAM

## 2023-05-09 PROCEDURE — 80048 BASIC METABOLIC PNL TOTAL CA: CPT | Performed by: FAMILY MEDICINE

## 2023-05-09 PROCEDURE — P9016 RBC LEUKOCYTES REDUCED: HCPCS | Performed by: FAMILY MEDICINE

## 2023-05-09 PROCEDURE — 85025 COMPLETE CBC W/AUTO DIFF WBC: CPT | Performed by: FAMILY MEDICINE

## 2023-05-09 PROCEDURE — 25000003 PHARM REV CODE 250: Performed by: FAMILY MEDICINE

## 2023-05-09 PROCEDURE — 63600175 PHARM REV CODE 636 W HCPCS: Performed by: STUDENT IN AN ORGANIZED HEALTH CARE EDUCATION/TRAINING PROGRAM

## 2023-05-09 PROCEDURE — 63600175 PHARM REV CODE 636 W HCPCS: Performed by: FAMILY MEDICINE

## 2023-05-09 PROCEDURE — 11000001 HC ACUTE MED/SURG PRIVATE ROOM

## 2023-05-09 PROCEDURE — A4216 STERILE WATER/SALINE, 10 ML: HCPCS | Performed by: STUDENT IN AN ORGANIZED HEALTH CARE EDUCATION/TRAINING PROGRAM

## 2023-05-09 PROCEDURE — P9016 RBC LEUKOCYTES REDUCED: HCPCS | Performed by: STUDENT IN AN ORGANIZED HEALTH CARE EDUCATION/TRAINING PROGRAM

## 2023-05-09 PROCEDURE — 93005 ELECTROCARDIOGRAM TRACING: CPT

## 2023-05-09 PROCEDURE — 94760 N-INVAS EAR/PLS OXIMETRY 1: CPT

## 2023-05-09 RX ORDER — MAG HYDROX/ALUMINUM HYD/SIMETH 200-200-20
30 SUSPENSION, ORAL (FINAL DOSE FORM) ORAL 4 TIMES DAILY PRN
Status: DISCONTINUED | OUTPATIENT
Start: 2023-05-09 | End: 2023-05-12 | Stop reason: HOSPADM

## 2023-05-09 RX ORDER — IPRATROPIUM BROMIDE 0.5 MG/2.5ML
0.5 SOLUTION RESPIRATORY (INHALATION) EVERY 6 HOURS PRN
Status: DISCONTINUED | OUTPATIENT
Start: 2023-05-09 | End: 2023-05-12 | Stop reason: HOSPADM

## 2023-05-09 RX ORDER — HYDROCODONE BITARTRATE AND ACETAMINOPHEN 5; 325 MG/1; MG/1
1 TABLET ORAL EVERY 6 HOURS PRN
Status: DISCONTINUED | OUTPATIENT
Start: 2023-05-09 | End: 2023-05-12 | Stop reason: HOSPADM

## 2023-05-09 RX ORDER — ACETAMINOPHEN 325 MG/1
650 TABLET ORAL EVERY 4 HOURS PRN
Status: DISCONTINUED | OUTPATIENT
Start: 2023-05-09 | End: 2023-05-12 | Stop reason: HOSPADM

## 2023-05-09 RX ORDER — SIMETHICONE 80 MG
1 TABLET,CHEWABLE ORAL 4 TIMES DAILY PRN
Status: DISCONTINUED | OUTPATIENT
Start: 2023-05-09 | End: 2023-05-12 | Stop reason: HOSPADM

## 2023-05-09 RX ORDER — POLYETHYLENE GLYCOL 3350 17 G/17G
17 POWDER, FOR SOLUTION ORAL 3 TIMES DAILY PRN
Status: DISCONTINUED | OUTPATIENT
Start: 2023-05-09 | End: 2023-05-12 | Stop reason: HOSPADM

## 2023-05-09 RX ORDER — IBUPROFEN 200 MG
16 TABLET ORAL
Status: DISCONTINUED | OUTPATIENT
Start: 2023-05-09 | End: 2023-05-12 | Stop reason: HOSPADM

## 2023-05-09 RX ORDER — BISACODYL 10 MG
10 SUPPOSITORY, RECTAL RECTAL DAILY PRN
Status: DISCONTINUED | OUTPATIENT
Start: 2023-05-09 | End: 2023-05-12 | Stop reason: HOSPADM

## 2023-05-09 RX ORDER — IBUPROFEN 200 MG
24 TABLET ORAL
Status: DISCONTINUED | OUTPATIENT
Start: 2023-05-09 | End: 2023-05-12 | Stop reason: HOSPADM

## 2023-05-09 RX ORDER — MORPHINE SULFATE 4 MG/ML
2 INJECTION, SOLUTION INTRAMUSCULAR; INTRAVENOUS EVERY 6 HOURS PRN
Status: DISCONTINUED | OUTPATIENT
Start: 2023-05-09 | End: 2023-05-12 | Stop reason: HOSPADM

## 2023-05-09 RX ORDER — ONDANSETRON 2 MG/ML
4 INJECTION INTRAMUSCULAR; INTRAVENOUS EVERY 8 HOURS PRN
Status: DISCONTINUED | OUTPATIENT
Start: 2023-05-09 | End: 2023-05-12 | Stop reason: HOSPADM

## 2023-05-09 RX ORDER — LEVALBUTEROL INHALATION SOLUTION 1.25 MG/3ML
1.25 SOLUTION RESPIRATORY (INHALATION) EVERY 6 HOURS PRN
Status: DISCONTINUED | OUTPATIENT
Start: 2023-05-09 | End: 2023-05-12 | Stop reason: HOSPADM

## 2023-05-09 RX ORDER — ONDANSETRON 4 MG/1
8 TABLET, ORALLY DISINTEGRATING ORAL EVERY 8 HOURS PRN
Status: DISCONTINUED | OUTPATIENT
Start: 2023-05-09 | End: 2023-05-12 | Stop reason: HOSPADM

## 2023-05-09 RX ORDER — INSULIN ASPART 100 [IU]/ML
10 INJECTION, SOLUTION INTRAVENOUS; SUBCUTANEOUS
Status: DISCONTINUED | OUTPATIENT
Start: 2023-05-09 | End: 2023-05-11

## 2023-05-09 RX ORDER — LIDOCAINE HYDROCHLORIDE 10 MG/ML
INJECTION INFILTRATION; PERINEURAL
Status: DISCONTINUED | OUTPATIENT
Start: 2023-05-09 | End: 2023-05-09

## 2023-05-09 RX ORDER — HYDRALAZINE HYDROCHLORIDE 20 MG/ML
10 INJECTION INTRAMUSCULAR; INTRAVENOUS EVERY 4 HOURS PRN
Status: DISCONTINUED | OUTPATIENT
Start: 2023-05-09 | End: 2023-05-12 | Stop reason: HOSPADM

## 2023-05-09 RX ORDER — INSULIN ASPART 100 [IU]/ML
1-10 INJECTION, SOLUTION INTRAVENOUS; SUBCUTANEOUS
Status: DISCONTINUED | OUTPATIENT
Start: 2023-05-09 | End: 2023-05-12 | Stop reason: HOSPADM

## 2023-05-09 RX ORDER — FUROSEMIDE 10 MG/ML
20 INJECTION INTRAMUSCULAR; INTRAVENOUS ONCE
Status: COMPLETED | OUTPATIENT
Start: 2023-05-09 | End: 2023-05-09

## 2023-05-09 RX ORDER — TALC
9 POWDER (GRAM) TOPICAL NIGHTLY PRN
Status: DISCONTINUED | OUTPATIENT
Start: 2023-05-09 | End: 2023-05-12 | Stop reason: HOSPADM

## 2023-05-09 RX ORDER — ESCITALOPRAM OXALATE 5 MG/1
5 TABLET ORAL DAILY
Status: DISCONTINUED | OUTPATIENT
Start: 2023-05-09 | End: 2023-05-12 | Stop reason: HOSPADM

## 2023-05-09 RX ORDER — GLUCAGON 1 MG
1 KIT INJECTION
Status: DISCONTINUED | OUTPATIENT
Start: 2023-05-09 | End: 2023-05-09

## 2023-05-09 RX ORDER — SODIUM CHLORIDE 0.9 % (FLUSH) 0.9 %
10 SYRINGE (ML) INJECTION
Status: DISCONTINUED | OUTPATIENT
Start: 2023-05-09 | End: 2023-05-09

## 2023-05-09 RX ADMIN — ESCITALOPRAM 5 MG: 5 TABLET, FILM COATED ORAL at 02:05

## 2023-05-09 RX ADMIN — MUPIROCIN: 20 OINTMENT TOPICAL at 10:05

## 2023-05-09 RX ADMIN — BUDESONIDE INHALATION 0.5 MG: 0.5 SUSPENSION RESPIRATORY (INHALATION) at 09:05

## 2023-05-09 RX ADMIN — PIPERACILLIN SODIUM AND TAZOBACTAM SODIUM 4.5 G: 4; .5 INJECTION, POWDER, LYOPHILIZED, FOR SOLUTION INTRAVENOUS at 09:05

## 2023-05-09 RX ADMIN — FUROSEMIDE 20 MG: 10 INJECTION, SOLUTION INTRAMUSCULAR; INTRAVENOUS at 02:05

## 2023-05-09 RX ADMIN — CARVEDILOL 12.5 MG: 12.5 TABLET, FILM COATED ORAL at 10:05

## 2023-05-09 RX ADMIN — RIVAROXABAN 20 MG: 20 TABLET, FILM COATED ORAL at 05:05

## 2023-05-09 RX ADMIN — COLLAGENASE SANTYL: 250 OINTMENT TOPICAL at 03:05

## 2023-05-09 RX ADMIN — INSULIN ASPART 10 UNITS: 100 INJECTION, SOLUTION INTRAVENOUS; SUBCUTANEOUS at 05:05

## 2023-05-09 RX ADMIN — SITAGLIPTIN 100 MG: 50 TABLET, FILM COATED ORAL at 02:05

## 2023-05-09 RX ADMIN — PIPERACILLIN SODIUM AND TAZOBACTAM SODIUM 4.5 G: 4; .5 INJECTION, POWDER, LYOPHILIZED, FOR SOLUTION INTRAVENOUS at 08:05

## 2023-05-09 RX ADMIN — Medication 10 ML: at 06:05

## 2023-05-09 RX ADMIN — Medication 10 ML: at 02:05

## 2023-05-09 RX ADMIN — INSULIN DETEMIR 30 UNITS: 100 INJECTION, SOLUTION SUBCUTANEOUS at 08:05

## 2023-05-09 NOTE — PROCEDURES
"Antonio Galvan II is a 57 y.o. male patient.    Temp: 98.8 °F (37.1 °C) (05/08/23 1230)  Pulse: 60 (05/08/23 1337)  Resp: 18 (05/08/23 1337)  BP: (!) 107/50 (05/08/23 1230)  SpO2: 99 % (05/08/23 1337)  Weight: 100.7 kg (222 lb) (05/08/23 1230)  Height: 5' 8" (172.7 cm) (05/08/23 1230)    PICC  Date/Time: 5/8/2023 6:44 PM  Performed by: Pallavi Vidales RN  Consent Done: Yes  Time out: Immediately prior to procedure a time out was called to verify the correct patient, procedure, equipment, support staff and site/side marked as required  Indications: med administration  Preparation: skin prepped with ChloraPrep  Skin prep agent dried: skin prep agent completely dried prior to procedure  Sterile barriers: all five maximum sterile barriers used - cap, mask, sterile gown, sterile gloves, and large sterile sheet  Hand hygiene: hand hygiene performed prior to central venous catheter insertion  Location details: right brachial  Catheter type: double lumen  Catheter size: 5 Fr  Catheter Length: 46cm    Ultrasound guidance: yes  Vessel Caliber: medium and patent, compressibility normal  Number of attempts: 1  Post-procedure: blood return through all ports, chlorhexidine patch and sterile dressing applied          Name Pallavi Vidales RN  5/8/2023    "

## 2023-05-09 NOTE — PROGRESS NOTES
Re-assessment performed to R ischium wound and dressing changes performed to R ischium, L trochanter, sacrum, and L ischium. Improvement continues to R ischium with NPWT. Decrease in measurements from previous assessment noted. Applied Santyl to wound bed and then applied black granufoam and secured with transparent dressing. NPWT continuous at -125mmHg. Seal intact prior to completing assessment. Mother and nursing staff at bedside during assessment. Pt turned to right side and wedge in use prior to completing assessment. Heel boots and specialty bed in use. Continue with current wound care regimen to all wounds. Continue with aggressive pressure prevention measures during hospitalization. Orders in place.      05/09/23 1634   WOCN Assessment   WOCN Total Time (mins) 80   Visit Date 05/09/23   Visit Time 1515   Consult Type Follow Up   WOCN Speciality Wound   WOCN List wound vac   Wound pressure   Continence Type Fecal   Intervention assessed;chart review;orders   Teaching on-going   Skin Interventions   Device Skin Pressure Protection pressure points protected;positioning supports utilized   Pressure Reduction Devices heel offloading device utilized   Pressure Reduction Techniques positioned off wounds;pressure points protected   Skin Protection incontinence pads utilized   Positioning   Body Position turned;right   Head of Bed (HOB) Positioning HOB at 20-30 degrees   Positioning/Transfer Devices wedge;in use   Pressure Injury Prevention    Check Moisture Management Pad Done   Sacral Foam Dressing Replace   Heel protection technique Heel boot   Check Medical Devices Done        Altered Skin Integrity Left Greater trochanter Full thickness tissue loss. Base is covered by slough and/or eschar in the wound bed   No Date First Assessed or Time First Assessed found.   Altered Skin Integrity Present on Admission - Did Patient arrive to the hospital with altered skin?: yes  Side: Left  Location: Greater trochanter   Description of Altered Skin Integrity: Full thic...   Dressing Appearance Intact;Moist drainage   Drainage Amount Moderate   Drainage Characteristics/Odor Creamy   Care Cleansed with:;Antimicrobial agent   Dressing Applied;Sodium chloride impregnated;Gauze;Absorptive Pad  (Santyl/Mesalt)   Packing packed with;other (see comment)  (Santyl/Mesalt)        Altered Skin Integrity 01/12/23 1530 Sacral spine Full thickness tissue loss with exposed bone, tendon, or muscle. Often includes undermining and tunneling. May extend into muscle and/or supporting structures.   Date First Assessed/Time First Assessed: 01/12/23 1530   Altered Skin Integrity Present on Admission - Did Patient arrive to the hospital with altered skin?: yes  Location: Sacral spine  Description of Altered Skin Integrity: Full thickness tissue los...   Drainage Amount Moderate   Drainage Characteristics/Odor Serosanguineous   Care Cleansed with:;Sterile normal saline   Dressing Applied;Sodium chloride impregnated;Gauze;Absorptive Pad  (Santyl/Mesalt)        Altered Skin Integrity 01/12/23 1532 Right Ischial tuberosity Full thickness tissue loss with exposed bone, tendon, or muscle. Often includes undermining and tunneling. May extend into muscle and/or supporting structures.   Date First Assessed/Time First Assessed: 01/12/23 1532   Altered Skin Integrity Present on Admission - Did Patient arrive to the hospital with altered skin?: yes  Side: Right  Location: Ischial tuberosity  Description of Altered Skin Integrity: Full t...   Wound Image    Dressing Appearance Intact;Moist drainage   Drainage Amount Moderate   Drainage Characteristics/Odor Serosanguineous   Appearance Pink;Red;Slough;Necrotic;Tan;Yellow;Muscle;Adipose;Granulating;Gray   Tissue loss description Full thickness   Periwound Area Denuded   Wound Edges Defined   Wound Length (cm) 12.1 cm   Wound Width (cm) 14.6 cm   Wound Depth (cm) 3.5 cm   Wound Volume (cm^3) 618.31 cm^3   Wound Surface  Area (cm^2) 176.66 cm^2   Care Cleansed with:;Antimicrobial agent;Sterile normal saline   Dressing Applied;Transparent film;Other (comment)  (Santyl/Black Granufoam)        Altered Skin Integrity 05/08/23 1639 Left Ischial tuberosity Partial thickness tissue loss. Shallow open ulcer with a red or pink wound bed, without slough. Intact or Open/Ruptured Serum-filled blister.   Date First Assessed/Time First Assessed: 05/08/23 1639   Altered Skin Integrity Present on Admission - Did Patient arrive to the hospital with altered skin?: yes  Side: Left  Location: Ischial tuberosity  Is this injury device related?: No  Descriptio...   Dressing Appearance Intact;Moist drainage   Drainage Amount Small   Drainage Characteristics/Odor Serosanguineous;No odor   Care Cleansed with:;Sterile normal saline   Dressing Applied;Sodium chloride impregnated;Gauze;Absorptive Pad;Other (comment)  (Santyl/Mesalt)        Negative Pressure Wound Therapy  03/20/23 1500 Right   Placement Date/Time: 03/20/23 1500   Side: Right  Location: Ischial tuberosity   NPWT Type Vacuum Therapy   Therapy Setting NPWT Continuous therapy   Pressure Setting NPWT 125 mmHg   Therapy Interventions NPWT Seal intact   Sponges Inserted NPWT Black;2   Sponges Removed NPWT Black;2
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
## 2023-05-09 NOTE — NURSING
Call received from Dr. Ortega concerning pt labs. He stated that he is ordering a 2nd unit of blood to be transfused vs just 1 unit. He also expressed concern about pt going to surgery in the morning. Stated he phoned Dr. Reyes to discuss concerns with her. Information passed along to primary nurse VARSHA Langley.

## 2023-05-09 NOTE — NURSING
Nurses Note -- 4 Eyes      5/8/2023   7:59 PM      Skin assessed during: Admit      [] No Altered Skin Integrity Present    []Prevention Measures Documented      [x] Yes- Altered Skin Integrity Present or Discovered   [x] LDA Added if Not in Epic (Describe Wound)   [x] New Altered Skin Integrity was Present on Admit and Documented in LDA   [x] Wound Image Taken    Wound Care Consulted? Yes    Attending Nurse:  Rhea Daily RN     Second RN/Staff Member:  Norma Pacheco RN

## 2023-05-09 NOTE — PLAN OF CARE
Problem: Adult Inpatient Plan of Care  Goal: Plan of Care Review  Outcome: Ongoing, Progressing  Flowsheets (Taken 5/9/2023 0023)  Plan of Care Reviewed With:   patient   family  Goal: Patient-Specific Goal (Individualized)  Outcome: Ongoing, Progressing  Flowsheets (Taken 5/9/2023 0023)  Anxieties, Fears or Concerns: none  Individualized Care Needs: turn q 2/woundcare     Problem: Impaired Wound Healing  Goal: Optimal Wound Healing  Outcome: Ongoing, Progressing     Problem: Infection  Goal: Absence of Infection Signs and Symptoms  Outcome: Ongoing, Progressing

## 2023-05-10 LAB
ALBUMIN SERPL-MCNC: 2.4 G/DL (ref 3.5–5)
ALBUMIN/GLOB SERPL: 0.6 RATIO (ref 1.1–2)
ALP SERPL-CCNC: 100 UNIT/L (ref 40–150)
ALT SERPL-CCNC: 11 UNIT/L (ref 0–55)
AST SERPL-CCNC: 8 UNIT/L (ref 5–34)
BASOPHILS # BLD AUTO: 0.01 X10(3)/MCL
BASOPHILS NFR BLD AUTO: 0.1 %
BILIRUBIN DIRECT+TOT PNL SERPL-MCNC: 0.7 MG/DL
BSA FOR ECHO PROCEDURE: 2.2 M2
BUN SERPL-MCNC: 36.1 MG/DL (ref 8.4–25.7)
CALCIUM SERPL-MCNC: 8.2 MG/DL (ref 8.4–10.2)
CHLORIDE SERPL-SCNC: 102 MMOL/L (ref 98–107)
CO2 SERPL-SCNC: 23 MMOL/L (ref 22–29)
CREAT SERPL-MCNC: 1.93 MG/DL (ref 0.73–1.18)
EJECTION FRACTION: 60 %
EOSINOPHIL # BLD AUTO: 0.23 X10(3)/MCL (ref 0–0.9)
EOSINOPHIL NFR BLD AUTO: 2.7 %
ERYTHROCYTE [DISTWIDTH] IN BLOOD BY AUTOMATED COUNT: 22.9 % (ref 11.5–17)
FOLATE SERPL-MCNC: 3.5 NG/ML (ref 7–31.4)
GFR SERPLBLD CREATININE-BSD FMLA CKD-EPI: 40 MLS/MIN/1.73/M2
GLOBULIN SER-MCNC: 3.8 GM/DL (ref 2.4–3.5)
GLUCOSE SERPL-MCNC: 179 MG/DL (ref 74–100)
HCT VFR BLD AUTO: 28.5 % (ref 42–52)
HGB BLD-MCNC: 8.6 G/DL (ref 14–18)
IMM GRANULOCYTES # BLD AUTO: 0.03 X10(3)/MCL (ref 0–0.04)
IMM GRANULOCYTES NFR BLD AUTO: 0.3 %
IRON SATN MFR SERPL: 10 % (ref 20–50)
IRON SERPL-MCNC: 19 UG/DL (ref 65–175)
LYMPHOCYTES # BLD AUTO: 1.39 X10(3)/MCL (ref 0.6–4.6)
LYMPHOCYTES NFR BLD AUTO: 16 %
MAGNESIUM SERPL-MCNC: 1.84 MG/DL (ref 1.6–2.6)
MCH RBC QN AUTO: 21.4 PG (ref 27–31)
MCHC RBC AUTO-ENTMCNC: 30.2 G/DL (ref 33–36)
MCV RBC AUTO: 70.9 FL (ref 80–94)
MONOCYTES # BLD AUTO: 0.52 X10(3)/MCL (ref 0.1–1.3)
MONOCYTES NFR BLD AUTO: 6 %
NEUTROPHILS # BLD AUTO: 6.49 X10(3)/MCL (ref 2.1–9.2)
NEUTROPHILS NFR BLD AUTO: 74.9 %
PLATELET # BLD AUTO: 308 X10(3)/MCL (ref 130–400)
PMV BLD AUTO: 9.4 FL (ref 7.4–10.4)
POCT GLUCOSE: 115 MG/DL (ref 70–110)
POCT GLUCOSE: 162 MG/DL (ref 70–110)
POCT GLUCOSE: 211 MG/DL (ref 70–110)
POCT GLUCOSE: 39 MG/DL (ref 70–110)
POCT GLUCOSE: 46 MG/DL (ref 70–110)
POCT GLUCOSE: 65 MG/DL (ref 70–110)
POCT GLUCOSE: 90 MG/DL (ref 70–110)
POTASSIUM SERPL-SCNC: 4.4 MMOL/L (ref 3.5–5.1)
PROT SERPL-MCNC: 6.2 GM/DL (ref 6.4–8.3)
RBC # BLD AUTO: 4.02 X10(6)/MCL (ref 4.7–6.1)
SODIUM SERPL-SCNC: 136 MMOL/L (ref 136–145)
TIBC SERPL-MCNC: 170 UG/DL (ref 69–240)
TIBC SERPL-MCNC: 189 UG/DL (ref 250–450)
TRANSFERRIN SERPL-MCNC: 174 MG/DL (ref 174–364)
VIT B12 SERPL-MCNC: 397 PG/ML (ref 213–816)
WBC # SPEC AUTO: 8.67 X10(3)/MCL (ref 4.5–11.5)

## 2023-05-10 PROCEDURE — A4216 STERILE WATER/SALINE, 10 ML: HCPCS | Performed by: STUDENT IN AN ORGANIZED HEALTH CARE EDUCATION/TRAINING PROGRAM

## 2023-05-10 PROCEDURE — 25000003 PHARM REV CODE 250: Performed by: STUDENT IN AN ORGANIZED HEALTH CARE EDUCATION/TRAINING PROGRAM

## 2023-05-10 PROCEDURE — 83550 IRON BINDING TEST: CPT | Performed by: STUDENT IN AN ORGANIZED HEALTH CARE EDUCATION/TRAINING PROGRAM

## 2023-05-10 PROCEDURE — 63600175 PHARM REV CODE 636 W HCPCS: Performed by: STUDENT IN AN ORGANIZED HEALTH CARE EDUCATION/TRAINING PROGRAM

## 2023-05-10 PROCEDURE — 82746 ASSAY OF FOLIC ACID SERUM: CPT | Performed by: STUDENT IN AN ORGANIZED HEALTH CARE EDUCATION/TRAINING PROGRAM

## 2023-05-10 PROCEDURE — 83735 ASSAY OF MAGNESIUM: CPT | Performed by: STUDENT IN AN ORGANIZED HEALTH CARE EDUCATION/TRAINING PROGRAM

## 2023-05-10 PROCEDURE — 97606 NEG PRS WND THER DME>50 SQCM: CPT

## 2023-05-10 PROCEDURE — 85025 COMPLETE CBC W/AUTO DIFF WBC: CPT | Performed by: STUDENT IN AN ORGANIZED HEALTH CARE EDUCATION/TRAINING PROGRAM

## 2023-05-10 PROCEDURE — 80053 COMPREHEN METABOLIC PANEL: CPT | Performed by: STUDENT IN AN ORGANIZED HEALTH CARE EDUCATION/TRAINING PROGRAM

## 2023-05-10 PROCEDURE — 94760 N-INVAS EAR/PLS OXIMETRY 1: CPT

## 2023-05-10 PROCEDURE — 82607 VITAMIN B-12: CPT | Performed by: STUDENT IN AN ORGANIZED HEALTH CARE EDUCATION/TRAINING PROGRAM

## 2023-05-10 PROCEDURE — 11000001 HC ACUTE MED/SURG PRIVATE ROOM

## 2023-05-10 RX ORDER — LOPERAMIDE HYDROCHLORIDE 2 MG/1
2 CAPSULE ORAL 4 TIMES DAILY PRN
Status: DISCONTINUED | OUTPATIENT
Start: 2023-05-10 | End: 2023-05-12 | Stop reason: HOSPADM

## 2023-05-10 RX ORDER — MAGNESIUM SULFATE HEPTAHYDRATE 40 MG/ML
2 INJECTION, SOLUTION INTRAVENOUS ONCE
Status: COMPLETED | OUTPATIENT
Start: 2023-05-10 | End: 2023-05-10

## 2023-05-10 RX ORDER — LIDOCAINE HYDROCHLORIDE 40 MG/ML
4 SOLUTION TOPICAL EVERY OTHER DAY
Status: DISCONTINUED | OUTPATIENT
Start: 2023-05-15 | End: 2023-05-12

## 2023-05-10 RX ORDER — FOLIC ACID 1 MG/1
2 TABLET ORAL DAILY
Status: DISCONTINUED | OUTPATIENT
Start: 2023-05-10 | End: 2023-05-12 | Stop reason: HOSPADM

## 2023-05-10 RX ADMIN — Medication 10 ML: at 06:05

## 2023-05-10 RX ADMIN — MAGNESIUM SULFATE HEPTAHYDRATE 2 G: 40 INJECTION, SOLUTION INTRAVENOUS at 02:05

## 2023-05-10 RX ADMIN — CARVEDILOL 12.5 MG: 12.5 TABLET, FILM COATED ORAL at 10:05

## 2023-05-10 RX ADMIN — COLLAGENASE SANTYL: 250 OINTMENT TOPICAL at 10:05

## 2023-05-10 RX ADMIN — FOLIC ACID 2 MG: 1 TABLET ORAL at 03:05

## 2023-05-10 RX ADMIN — PIPERACILLIN SODIUM AND TAZOBACTAM SODIUM 4.5 G: 4; .5 INJECTION, POWDER, LYOPHILIZED, FOR SOLUTION INTRAVENOUS at 08:05

## 2023-05-10 RX ADMIN — Medication 10 ML: at 08:05

## 2023-05-10 RX ADMIN — INSULIN ASPART 10 UNITS: 100 INJECTION, SOLUTION INTRAVENOUS; SUBCUTANEOUS at 08:05

## 2023-05-10 RX ADMIN — ESCITALOPRAM 5 MG: 5 TABLET, FILM COATED ORAL at 10:05

## 2023-05-10 RX ADMIN — INSULIN ASPART 2 UNITS: 100 INJECTION, SOLUTION INTRAVENOUS; SUBCUTANEOUS at 08:05

## 2023-05-10 RX ADMIN — RIVAROXABAN 20 MG: 20 TABLET, FILM COATED ORAL at 03:05

## 2023-05-10 RX ADMIN — Medication 10 ML: at 01:05

## 2023-05-10 RX ADMIN — INSULIN DETEMIR 15 UNITS: 100 INJECTION, SOLUTION SUBCUTANEOUS at 08:05

## 2023-05-10 RX ADMIN — INSULIN ASPART 10 UNITS: 100 INJECTION, SOLUTION INTRAVENOUS; SUBCUTANEOUS at 12:05

## 2023-05-10 RX ADMIN — SITAGLIPTIN 100 MG: 50 TABLET, FILM COATED ORAL at 10:05

## 2023-05-10 RX ADMIN — SODIUM CHLORIDE 125 MG: 9 INJECTION, SOLUTION INTRAVENOUS at 01:05

## 2023-05-10 RX ADMIN — Medication 10 ML: at 12:05

## 2023-05-10 RX ADMIN — INSULIN DETEMIR 30 UNITS: 100 INJECTION, SOLUTION SUBCUTANEOUS at 10:05

## 2023-05-10 NOTE — PLAN OF CARE
[facility]  Initial Discharge Assessment       Primary Care Provider: Jennifer Fernando NP    Admission Diagnosis: Decubitus ulcer of buttock, stage 4 [L89.304]    Admission Date: 5/8/2023  Expected Discharge Date:     Discharge Barriers Identified: None    Payor: MEDICARE / Plan: MEDICARE PART A & B / Product Type: Government /     Extended Emergency Contact Information  Primary Emergency Contact: Judy Galvan  Mobile Phone: [phone]  Relation: Mother  Preferred language: English   needed? No  Secondary Emergency Contact: jolly Russell  Mobile Phone: [phone]  Relation: Significant other  Preferred language: English   needed? No    Discharge Plan A: Home with family, Home Health         [facility] - [address] [address]  Phone: [phone] Fax: [phone]    [id number] [facility] [address]  Phone: [phone] Fax: [phone]      Initial Assessment (most recent)       Adult Discharge Assessment - 05/09/23 1946          Discharge Assessment    Assessment Type Discharge Planning Assessment     Confirmed/corrected address, phone number and insurance Yes     Confirmed Demographics Correct on Facesheet     Source of Information family     If unable to respond/provide information was family/caregiver contacted? Yes     Contact Name/Number Judy echeverria      Communicated SADE with patient/caregiver Date not available/Unable to determine     Reason For Admission Infected wound     People in Home parent(s)     Facility Arrived From: home     Do you expect to return to your current living situation? Yes     Do you have help at home or someone to help you manage your care at home? Yes     Who are your caregiver(s) and their phone number(s)? Judy echeverria       Prior to hospitilization cognitive status: Alert/Oriented     Current cognitive status: Alert/Oriented     Home Accessibility wheelchair accessible     Home Layout Able to live on 1st floor     Equipment Currently Used at Home hospital bed;lift device;wheelchair     Readmission within 30 days? No     Patient currently being followed by outpatient case management? No     Do you currently have service(s) that help you manage your care at home? No     Do you take prescription medications? Yes     Do you have prescription coverage? Yes     Do you have any problems affording any of your prescribed medications? TBD     Is the patient taking medications as prescribed? yes     Who is going to help you get home at discharge? Judy mother      How do you get to doctors appointments? family or friend will provide     Are you on dialysis? No     Do you take coumadin? No     Discharge Plan A Home with family;Home Health     DME Needed Upon Discharge  none     Discharge Plan discussed with: Parent(s)     Name(s) and Number(s) Judy echeverria      Discharge Barriers Identified None        Physical Activity    On average, how many days per week do you engage in moderate to strenuous exercise (like a brisk walk)? 0 days     On average, how many minutes do you engage in exercise at this level? 0 min        Financial Resource Strain    How hard is it for you to pay for the very basics like food, housing, medical care, and heating? Not hard at all        Housing Stability    In the last 12 months, was there a time when you were not able to pay the mortgage or rent on time? No     In the last 12 months, how many places have you lived? 1     In the last 12 months, was there a time when you did not have a steady place to sleep or slept in a shelter (including now)? No        Transportation Needs    In the past 12 months, has lack of transportation kept you from medical appointments or from getting medications? No      In the past 12 months, has lack of transportation kept you from meetings, work, or from getting things needed for daily living? No        Food Insecurity    Within the past 12 months, you worried that your food would run out before you got the money to buy more. Never true     Within the past 12 months, the food you bought just didn't last and you didn't have money to get more. Never true        Stress    Do you feel stress - tense, restless, nervous, or anxious, or unable to sleep at night because your mind is troubled all the time - these days? Only a little        Social Connections    In a typical week, how many times do you talk on the phone with family, friends, or neighbors? More than three times a week     How often do you get together with friends or relatives? More than three times a week     How often do you attend Orthodoxy or Yarsanism services? More than 4 times per year     Do you belong to any clubs or organizations such as Orthodoxy groups, unions, fraternal or athletic groups, or school groups? No     How often do you attend meetings of the clubs or organizations you belong to? 1 to 4 times per year     Are you , , , , never , or living with a partner? Never         Alcohol Use    Q1: How often do you have a drink containing alcohol? Never     Q2: How many drinks containing alcohol do you have on a typical day when you are drinking? Patient does not drink     Q3: How often do you have six or more drinks on one occasion? Never        OTHER    Name(s) of People in Home Judy mother
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
## 2023-05-10 NOTE — PLAN OF CARE
Problem: Adult Inpatient Plan of Care  Goal: Plan of Care Review  Outcome: Ongoing, Progressing  Flowsheets (Taken 5/10/2023 0308)  Plan of Care Reviewed With: patient  Goal: Patient-Specific Goal (Individualized)  Outcome: Ongoing, Progressing  Flowsheets (Taken 5/10/2023 0308)  Anxieties, Fears or Concerns: none  Individualized Care Needs: turn every two hours until end of shift 7a  Goal: Absence of Hospital-Acquired Illness or Injury  Outcome: Ongoing, Progressing  Intervention: Identify and Manage Fall Risk  Flowsheets (Taken 5/10/2023 0308)  Safety Promotion/Fall Prevention:   assistive device/personal item within reach   bed alarm set   Fall Risk reviewed with patient/family   side rails raised x 3  Intervention: Prevent Skin Injury  Flowsheets (Taken 5/10/2023 0308)  Body Position: supine  Skin Protection: incontinence pads utilized  Intervention: Prevent and Manage VTE (Venous Thromboembolism) Risk  Flowsheets (Taken 5/10/2023 0308)  Activity Management: Patient unable to perform activities  VTE Prevention/Management:   bleeding risk assessed   bleeding precations maintained  Range of Motion: active ROM (range of motion) encouraged  Intervention: Prevent Infection  Flowsheets (Taken 5/10/2023 0308)  Infection Prevention:   environmental surveillance performed   personal protective equipment utilized   rest/sleep promoted   single patient room provided   equipment surfaces disinfected   hand hygiene promoted
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
## 2023-05-10 NOTE — PLAN OF CARE
Problem: Adult Inpatient Plan of Care  Goal: Plan of Care Review  Outcome: Ongoing, Progressing  Flowsheets (Taken 5/10/2023 1718)  Plan of Care Reviewed With:   patient   caregiver   family  Goal: Patient-Specific Goal (Individualized)  Outcome: Ongoing, Progressing  Flowsheets (Taken 5/10/2023 1718)  Anxieties, Fears or Concerns: None  Individualized Care Needs: Turn every two hours until end of shift 7 p  Goal: Absence of Hospital-Acquired Illness or Injury  Outcome: Ongoing, Progressing  Intervention: Identify and Manage Fall Risk  Flowsheets (Taken 5/10/2023 1718)  Safety Promotion/Fall Prevention:   assistive device/personal item within reach   bed alarm set  Goal: Optimal Comfort and Wellbeing  Outcome: Ongoing, Progressing  Goal: Readiness for Transition of Care  Outcome: Ongoing, Progressing     Problem: Diabetes Comorbidity  Goal: Blood Glucose Level Within Targeted Range  Outcome: Ongoing, Progressing     Problem: Adjustment to Illness (Sepsis/Septic Shock)  Goal: Optimal Coping  Outcome: Ongoing, Progressing     Problem: Bleeding (Sepsis/Septic Shock)  Goal: Absence of Bleeding  Outcome: Ongoing, Progressing     Problem: Glycemic Control Impaired (Sepsis/Septic Shock)  Goal: Blood Glucose Level Within Desired Range  Outcome: Ongoing, Progressing     Problem: Infection Progression (Sepsis/Septic Shock)  Goal: Absence of Infection Signs and Symptoms  Outcome: Ongoing, Progressing     Problem: Nutrition Impaired (Sepsis/Septic Shock)  Goal: Optimal Nutrition Intake  Outcome: Ongoing, Progressing     Problem: Impaired Wound Healing  Goal: Optimal Wound Healing  Outcome: Ongoing, Progressing     Problem: Skin Injury Risk Increased  Goal: Skin Health and Integrity  Outcome: Ongoing, Progressing     Problem: Infection  Goal: Absence of Infection Signs and Symptoms  Outcome: Ongoing, Progressing

## 2023-05-10 NOTE — PROGRESS NOTES
Name: Antonio Galvan II    : 1965 (57 y.o.)  MRN: [id number]           Patient Unavailable      Patient unable to be seen at this time secondary to: Per RN, low blood sugars at this time therefore hold eval until elevated. Pt given orange juice and food. Will attempt later as appropriate.

## 2023-05-10 NOTE — PROGRESS NOTES
Spoke with TONY / Sina regarding home wound vac.   Notified him patient is currently hospitalized and has a known technical issue with his wound vac.   Per Sina, they will place a hold on his account while hospitalized and opened a ticket to have his wound vac switched out (home use vac). Work order number is [id number]

## 2023-05-11 LAB
BACTERIA UR CULT: ABNORMAL
POCT GLUCOSE: 149 MG/DL (ref 70–110)
POCT GLUCOSE: 160 MG/DL (ref 70–110)
POCT GLUCOSE: 169 MG/DL (ref 70–110)
POCT GLUCOSE: 177 MG/DL (ref 70–110)

## 2023-05-11 PROCEDURE — 63600175 PHARM REV CODE 636 W HCPCS: Performed by: STUDENT IN AN ORGANIZED HEALTH CARE EDUCATION/TRAINING PROGRAM

## 2023-05-11 PROCEDURE — 11000001 HC ACUTE MED/SURG PRIVATE ROOM

## 2023-05-11 PROCEDURE — 25000003 PHARM REV CODE 250: Performed by: STUDENT IN AN ORGANIZED HEALTH CARE EDUCATION/TRAINING PROGRAM

## 2023-05-11 PROCEDURE — A4216 STERILE WATER/SALINE, 10 ML: HCPCS | Performed by: STUDENT IN AN ORGANIZED HEALTH CARE EDUCATION/TRAINING PROGRAM

## 2023-05-11 PROCEDURE — 97162 PT EVAL MOD COMPLEX 30 MIN: CPT

## 2023-05-11 PROCEDURE — 94760 N-INVAS EAR/PLS OXIMETRY 1: CPT

## 2023-05-11 PROCEDURE — 97167 OT EVAL HIGH COMPLEX 60 MIN: CPT

## 2023-05-11 RX ORDER — INSULIN ASPART 100 [IU]/ML
7 INJECTION, SOLUTION INTRAVENOUS; SUBCUTANEOUS
Status: DISCONTINUED | OUTPATIENT
Start: 2023-05-11 | End: 2023-05-12 | Stop reason: HOSPADM

## 2023-05-11 RX ORDER — LABETALOL HCL 20 MG/4 ML
10 SYRINGE (ML) INTRAVENOUS ONCE
Status: COMPLETED | OUTPATIENT
Start: 2023-05-11 | End: 2023-05-11

## 2023-05-11 RX ADMIN — ONDANSETRON 4 MG: 2 INJECTION INTRAMUSCULAR; INTRAVENOUS at 06:05

## 2023-05-11 RX ADMIN — FOLIC ACID 2 MG: 1 TABLET ORAL at 08:05

## 2023-05-11 RX ADMIN — Medication 10 ML: at 11:05

## 2023-05-11 RX ADMIN — PIPERACILLIN SODIUM AND TAZOBACTAM SODIUM 4.5 G: 4; .5 INJECTION, POWDER, LYOPHILIZED, FOR SOLUTION INTRAVENOUS at 10:05

## 2023-05-11 RX ADMIN — INSULIN ASPART 7 UNITS: 100 INJECTION, SOLUTION INTRAVENOUS; SUBCUTANEOUS at 08:05

## 2023-05-11 RX ADMIN — PIPERACILLIN SODIUM AND TAZOBACTAM SODIUM 4.5 G: 4; .5 INJECTION, POWDER, LYOPHILIZED, FOR SOLUTION INTRAVENOUS at 08:05

## 2023-05-11 RX ADMIN — INSULIN DETEMIR 15 UNITS: 100 INJECTION, SOLUTION SUBCUTANEOUS at 10:05

## 2023-05-11 RX ADMIN — SITAGLIPTIN 100 MG: 50 TABLET, FILM COATED ORAL at 08:05

## 2023-05-11 RX ADMIN — INSULIN ASPART 7 UNITS: 100 INJECTION, SOLUTION INTRAVENOUS; SUBCUTANEOUS at 05:05

## 2023-05-11 RX ADMIN — CARVEDILOL 12.5 MG: 12.5 TABLET, FILM COATED ORAL at 08:05

## 2023-05-11 RX ADMIN — HYDRALAZINE HYDROCHLORIDE 10 MG: 20 INJECTION INTRAMUSCULAR; INTRAVENOUS at 11:05

## 2023-05-11 RX ADMIN — Medication 10 ML: at 06:05

## 2023-05-11 RX ADMIN — RIVAROXABAN 20 MG: 20 TABLET, FILM COATED ORAL at 05:05

## 2023-05-11 RX ADMIN — LABETALOL HYDROCHLORIDE 10 MG: 5 INJECTION, SOLUTION INTRAVENOUS at 08:05

## 2023-05-11 RX ADMIN — ESCITALOPRAM 5 MG: 5 TABLET, FILM COATED ORAL at 08:05

## 2023-05-11 RX ADMIN — INSULIN ASPART 7 UNITS: 100 INJECTION, SOLUTION INTRAVENOUS; SUBCUTANEOUS at 11:05

## 2023-05-11 RX ADMIN — SODIUM CHLORIDE 125 MG: 9 INJECTION, SOLUTION INTRAVENOUS at 02:05

## 2023-05-11 RX ADMIN — Medication 10 ML: at 12:05

## 2023-05-11 RX ADMIN — Medication 10 ML: at 05:05
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
## 2023-05-11 NOTE — PT/OT/SLP EVAL
Occupational Therapy   Evaluation    Name: Antonio Galvan II  MRN: [id number]  Admitting Diagnosis:  Atrial flutter      History:   Antonio Galvan II is a 57 y.o. male with a medical diagnosis of Atrial flutter.    Past Medical History:   Diagnosis Date    A-fib     Cardiac arrest     7/2022    Chronic skin ulcer     DM (diabetes mellitus)     Infected decubitus ulcer     Lymphedema of leg     Neurogenic bladder     Obesity, unspecified     Pressure ulcer of heel     Pressure ulcer of right foot, stage 3     Pressure ulcer of right ischium     Quadriplegia     Quadriplegic spinal paralysis        No past surgical history on file.    Subjective     Chief Complaint: Pt wanting to get out of bed   Patient/Family Comments/goals: Return back home    Occupational Profile:  Lives with: girlfriend and sitters  Home Environment:  home with ramp   Previous level of function: Dependent and w/c bound (quadriplegic) - sitters perform all ADLs and perform ROM daily to UEs/LEs  Equipment Used at Home:  power chair, hospital bed, lift device      Pain/Comfort:  Pain Rating 1: 0/10      Objective:     Communicated with: RN prior to session.  Patient found supine with bed alarm, wound vac, sandhu catheter upon OT entry to room.    General Precautions: Standard, fall   Orthopedic Precautions:N/A   Braces: N/A  Respiratory Status: Room air    Occupational Performance:    Mobility  Assist level Comments    Bed mobility total assist    Transfer total assist    Functional mobility Does not occur    Sit to stand transitions Does not occur     Other:          Activities of Daily Living Assist level Comments    Feeding total assist    Grooming/hygiene total assist    Bathing total assist    Upper body dressing total assist    Lower body dressing total assist    Toileting total assist    Toilet transfer total assist    Adaptive equipment training     Other:       Physical Exam:  Upper Extremity Range of Motion:     -       Right Upper  Extremity: No AROM  -       Left Upper Extremity: No AROM    Cognitive/Visual Perceptual:  Cognitive/Psychosocial Skills:     -       Oriented to: Person, Place, Time, and Situation       Treatment & Education:  Sitter transferred Pt to power chair using slide board and performed all adjustments in chair. Pt is able to control w/c using mouth wand.     Patient left up in chair with  sitter present    Assessment:     He presents at baseline level with ADL tasks. Performance deficits affecting function: abnormal tone, impaired skin.        Plan:     Patient does not warrant OT services at this time due to: Pt is dependent in ADL tasks - sitter performs daily   Plan of Care Reviewed with: patient, caregiver, significant other      Recommendations:     Discharge Recommendations: home  Discharge Equipment Recommendations:  none      Time Tracking:     OT Date of Treatment: 05/11/23  OT Start Time: 0850  OT Stop Time: 0937  OT Total Time (min): 47 min    Billable Minutes:Evaluation 47 min    5/11/2023
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
## 2023-05-11 NOTE — PLAN OF CARE
Problem: Adult Inpatient Plan of Care  Goal: Plan of Care Review  Outcome: Ongoing, Progressing  Flowsheets (Taken 5/11/2023 1218)  Plan of Care Reviewed With:   patient   caregiver   significant other  Goal: Patient-Specific Goal (Individualized)  Outcome: Ongoing, Progressing  Flowsheets (Taken 5/11/2023 1218)  Anxieties, Fears or Concerns: Wound healing  Individualized Care Needs: Antibiotic therapy, wound care  Goal: Absence of Hospital-Acquired Illness or Injury  Outcome: Ongoing, Progressing  Intervention: Identify and Manage Fall Risk  Flowsheets (Taken 5/11/2023 1218)  Safety Promotion/Fall Prevention:   assistive device/personal item within reach   bed alarm set   chair alarm set   lighting adjusted   medications reviewed   side rails raised x 3   instructed to call staff for mobility   room near unit station  Intervention: Prevent Skin Injury  Flowsheets (Taken 5/11/2023 1218)  Body Position:   log-rolled   supine   sitting up in bed  Skin Protection:   adhesive use limited   incontinence pads utilized   transparent dressing maintained   tubing/devices free from skin contact   skin sealant/moisture barrier applied  Intervention: Prevent and Manage VTE (Venous Thromboembolism) Risk  Flowsheets (Taken 5/11/2023 1218)  Activity Management:   Rolling - L1   Up in chair - L3  VTE Prevention/Management:   fluids promoted   bleeding precations maintained  Range of Motion: active ROM (range of motion) encouraged  Intervention: Prevent Infection  Flowsheets (Taken 5/11/2023 1218)  Infection Prevention:   cohorting utilized   environmental surveillance performed   equipment surfaces disinfected   hand hygiene promoted   single patient room provided  Goal: Optimal Comfort and Wellbeing  Outcome: Ongoing, Progressing  Intervention: Monitor Pain and Promote Comfort  Flowsheets (Taken 5/11/2023 1218)  Pain Management Interventions:   care clustered   pillow support provided   position adjusted  Intervention: Provide  Person-Centered Care  Flowsheets (Taken 5/11/2023 1218)  Trust Relationship/Rapport:   care explained   choices provided   questions answered   questions encouraged  Goal: Readiness for Transition of Care  Outcome: Ongoing, Progressing     Problem: Diabetes Comorbidity  Goal: Blood Glucose Level Within Targeted Range  Outcome: Ongoing, Progressing  Intervention: Monitor and Manage Glycemia  Flowsheets (Taken 5/11/2023 1218)  Glycemic Management:   blood glucose monitored   oral hydration promoted   supplemental insulin given     Problem: Adjustment to Illness (Sepsis/Septic Shock)  Goal: Optimal Coping  Outcome: Ongoing, Progressing     Problem: Bleeding (Sepsis/Septic Shock)  Goal: Absence of Bleeding  Outcome: Ongoing, Progressing     Problem: Glycemic Control Impaired (Sepsis/Septic Shock)  Goal: Blood Glucose Level Within Desired Range  Outcome: Ongoing, Progressing  Intervention: Optimize Glycemic Control  Flowsheets (Taken 5/11/2023 1218)  Glycemic Management:   blood glucose monitored   oral hydration promoted   supplemental insulin given     Problem: Infection Progression (Sepsis/Septic Shock)  Goal: Absence of Infection Signs and Symptoms  Outcome: Ongoing, Progressing  Intervention: Initiate Sepsis Management  Flowsheets (Taken 5/11/2023 1218)  Infection Prevention:   cohorting utilized   environmental surveillance performed   equipment surfaces disinfected   hand hygiene promoted   single patient room provided  Infection Management: aseptic technique maintained  Intervention: Promote Recovery  Flowsheets (Taken 5/11/2023 1218)  Activity Management:   Rolling - L1   Up in chair - L3     Problem: Nutrition Impaired (Sepsis/Septic Shock)  Goal: Optimal Nutrition Intake  Outcome: Ongoing, Progressing     Problem: Impaired Wound Healing  Goal: Optimal Wound Healing  Outcome: Ongoing, Progressing  Intervention: Promote Wound Healing  Flowsheets (Taken 5/11/2023 1218)  Oral Nutrition Promotion:   calorie-dense  foods provided   calorie-dense liquids provided   physical activity promoted   rest periods promoted   safe use of adaptive equipment encouraged  Activity Management:   Rolling - L1   Up in chair - L3  Pain Management Interventions:   care clustered   pillow support provided   position adjusted     Problem: Skin Injury Risk Increased  Goal: Skin Health and Integrity  Outcome: Ongoing, Progressing  Intervention: Optimize Skin Protection  Flowsheets (Taken 5/11/2023 1218)  Pressure Reduction Techniques:   weight shift assistance provided   positioned off wounds  Skin Protection:   adhesive use limited   incontinence pads utilized   transparent dressing maintained   tubing/devices free from skin contact   skin sealant/moisture barrier applied  Intervention: Promote and Optimize Oral Intake  Flowsheets (Taken 5/11/2023 1218)  Oral Nutrition Promotion:   calorie-dense foods provided   calorie-dense liquids provided   physical activity promoted   rest periods promoted   safe use of adaptive equipment encouraged     Problem: Infection  Goal: Absence of Infection Signs and Symptoms  Outcome: Ongoing, Progressing  Intervention: Prevent or Manage Infection  Flowsheets (Taken 5/11/2023 1218)  Infection Management: aseptic technique maintained     Problem: Fall Injury Risk  Goal: Absence of Fall and Fall-Related Injury  Outcome: Ongoing, Progressing  Intervention: Identify and Manage Contributors  Flowsheets (Taken 5/11/2023 1218)  Self-Care Promotion:   independence encouraged   BADL personal objects within reach   meal set-up provided   safe use of adaptive equipment encouraged  Intervention: Promote Injury-Free Environment  Flowsheets (Taken 5/11/2023 1218)  Safety Promotion/Fall Prevention:   assistive device/personal item within reach   bed alarm set   chair alarm set   lighting adjusted   medications reviewed   side rails raised x 3   instructed to call staff for mobility   room near unit station

## 2023-05-11 NOTE — PT/OT/SLP EVAL
Physical Therapy IP Evaluation and Treatment    Patient Name: Antonio Galvan II   MRN: [id number]  Recent Surgery: Procedure(s) (LRB):  DEBRIDEMENT, BUTTOCK (Right) * Surgery Date in Future *    Recommendations:     Discharge Recommendations: home, home with home health   Discharge Equipment Recommendations: none   Barriers to discharge: Ongoing medical treatment    Assessment:     Antonio Galvan II is a 57 y.o. male admitted with a medical diagnosis of Atrial flutter. He presents with the following impairments/functional limitations: decreased ROM, impaired self care skills, impaired functional mobility, decreased upper extremity function, decreased lower extremity function. Pt is use to being up all day and has been stick in the bed here. Family and Pt are worried about regression in ROM and thus impacting his function upon DC.     Rehab Prognosis: Fair; patient would benefit from acute PT services to address these deficits and reach maximum level of function.    Plan:     During this hospitalization, patient to be seen 6 x/week (QD/PM) to address the above listed problems via therapeutic exercises, therapeutic activities    Plan of Care Expires: 05/25/23    Subjective     Chief Complaint: Being stuck in bed, worsening wounds.   Patient Comments/Goals: Get ROM while here and be able to get up with family in WC if ok'd by wound care/MD  Pain/Comfort:  Pain Rating 1: 0/10  Pain Rating Post-Intervention 1: 0/10    Social History:  Living Environment: Patient lives with their family  in a single story home with  all needs accessible  Prior Level of Function: Prior to admission, patient was dependent in all care. Slide board with sitter to and from power chair  Equipment Used at Home: power chair, hospital bed, lift device, slide board  DME owned (not currently used): none  Assistance Upon Discharge: family and sitter(s)    Objective:     Communicated with Family and OT prior to session. Patient found HOB elevated  with wound vac, perineural catheter, peripheral IV upon PT entry to room.    General Precautions: Standard,     Orthopedic Precautions:     Braces:      Respiratory Status: Room air    Exams:  Cognition: Patient is oriented to Person, Place, Time, Situation  RLE ROM: Deficits: lacking 50% of normal PROM  RLE Strength: Deficits: 0/5  LLE ROM: Deficits: lacking 75% of normal PROM  LLE Strength: Deficits: 0/5  RUE ROM: Deficits: Lacking 25% of normal PROM  LUE ROM: Deficits: lacking 24% of normal PROM    Functional Mobility:  Gait belt applied - No  Bed Mobility  Rolling Left: total assistance  Rolling Right: total assistance  Supine to Sit: total assistance for LE management and trunk management  Sit to Supine: total assistance for LE management and trunk management  Transfers  Bed to Chair: total assistance with sitter using Slide Board    Therapeutic Activities and Exercises:   Patient educated on role of acute care PT and PT POC, safety while in hospital including calling nurse for mobility, and call light usage  Family educated that he could use tess for B LE elevation using slings. Sitter ok to get pt to chair due to I and safety demonstrated with tasks.     AM-PAC 6 CLICK MOBILITY  Total Score:6    Patient left up in chair with all lines intact, call button in reach, and friend present.    GOALS:   Multidisciplinary Problems       Physical Therapy Goals          Problem: Physical Therapy    Goal Priority Disciplines Outcome Goal Variances Interventions   Physical Therapy Goal     PT, PT/OT Ongoing, Progressing     Description: Goals to be met by: Discharge     Patient will increase functional independence with mobility by performin. Pt to be able to tolerate ROM tasks to B UE and LE 2 x/day with 25% improvement/carryover demonstrated allowing for improved positioning to reduce risk of further skin break down.                          History:     Past Medical History:   Diagnosis Date    A-fib      Cardiac arrest     7/2022    Chronic skin ulcer     DM (diabetes mellitus)     Infected decubitus ulcer     Lymphedema of leg     Neurogenic bladder     Obesity, unspecified     Pressure ulcer of heel     Pressure ulcer of right foot, stage 3     Pressure ulcer of right ischium     Quadriplegia     Quadriplegic spinal paralysis        No past surgical history on file.    Time Tracking:     PT Received On: 05/11/23  PT Start Time: 0850  PT Stop Time: 0935  PT Total Time (min): 45 min     Billable Minutes: Evaluation Moderate    5/11/2023

## 2023-05-11 NOTE — NURSING
Nurses Note -- 4 Eyes      5/11/2023   3:54 AM      Skin assessed during: Daily Assessment      [] No Altered Skin Integrity Present    []Prevention Measures Documented      [x] Yes- Altered Skin Integrity Present or Discovered   [] LDA Added if Not in Epic (Describe Wound)   [x] New Altered Skin Integrity was Present on Admit and Documented in LDA   [x] Wound Image Taken    Wound Care Consulted? Yes    Attending Nurse:  Laura Carrington RN     Second RN/Staff Member:  VARSHA Ruff

## 2023-05-11 NOTE — PLAN OF CARE
Problem: Adult Inpatient Plan of Care  Goal: Plan of Care Review  Outcome: Ongoing, Progressing  Flowsheets (Taken 5/10/2023 2025)  Plan of Care Reviewed With:   patient   significant other  Goal: Patient-Specific Goal (Individualized)  Outcome: Ongoing, Progressing  Flowsheets (Taken 5/10/2023 2025)  Anxieties, Fears or Concerns: Wounds healing  Individualized Care Needs:   Comfort and supportive care measures   Turn Q2 hrs   Assess frequently for needs.  Goal: Absence of Hospital-Acquired Illness or Injury  Outcome: Ongoing, Progressing  Intervention: Identify and Manage Fall Risk  Flowsheets (Taken 5/10/2023 2025)  Safety Promotion/Fall Prevention:   bed alarm set   assistive device/personal item within reach   instructed to call staff for mobility   room near unit station   Fall Risk reviewed with patient/family   family to remain at bedside  Intervention: Prevent Infection  Flowsheets (Taken 5/10/2023 2025)  Infection Prevention:   equipment surfaces disinfected   rest/sleep promoted  Goal: Optimal Comfort and Wellbeing  Outcome: Ongoing, Progressing  Intervention: Monitor Pain and Promote Comfort  Flowsheets (Taken 5/10/2023 2025)  Pain Management Interventions:   care clustered   pain management plan reviewed with patient/caregiver   pillow support provided   relaxation techniques promoted   quiet environment facilitated  Goal: Readiness for Transition of Care  Outcome: Ongoing, Progressing     Problem: Diabetes Comorbidity  Goal: Blood Glucose Level Within Targeted Range  Outcome: Ongoing, Progressing  Intervention: Monitor and Manage Glycemia  Flowsheets (Taken 5/10/2023 2025)  Glycemic Management:   blood glucose monitored   supplemental insulin given

## 2023-05-11 NOTE — PLAN OF CARE
Problem: Physical Therapy  Goal: Physical Therapy Goal  Description: Goals to be met by: Discharge     Patient will increase functional independence with mobility by performin. Pt to be able to tolerate ROM tasks to B UE and LE 2 x/day with 25% improvement/carryover demonstrated allowing for improved positioning to reduce risk of further skin break down.     Outcome: Ongoing, Progressing

## 2023-05-12 ENCOUNTER — HOSPITAL ENCOUNTER (INPATIENT)
Facility: HOSPITAL | Age: 58
LOS: 13 days | Discharge: HOME | DRG: 592 | End: 2023-05-25
Attending: STUDENT IN AN ORGANIZED HEALTH CARE EDUCATION/TRAINING PROGRAM | Admitting: STUDENT IN AN ORGANIZED HEALTH CARE EDUCATION/TRAINING PROGRAM
Payer: MEDICARE

## 2023-05-12 VITALS
HEART RATE: 95 BPM | RESPIRATION RATE: 18 BRPM | HEIGHT: 68 IN | TEMPERATURE: 98 F | SYSTOLIC BLOOD PRESSURE: 96 MMHG | OXYGEN SATURATION: 98 % | WEIGHT: 222 LBS | DIASTOLIC BLOOD PRESSURE: 76 MMHG | BODY MASS INDEX: 33.65 KG/M2

## 2023-05-12 DIAGNOSIS — R07.9 CHEST PAIN: ICD-10-CM

## 2023-05-12 DIAGNOSIS — L98.429 SACRAL ULCER: ICD-10-CM

## 2023-05-12 DIAGNOSIS — Z91.89 AT RISK FOR PROLONGED QT INTERVAL SYNDROME: ICD-10-CM

## 2023-05-12 DIAGNOSIS — M86.159: Primary | ICD-10-CM

## 2023-05-12 DIAGNOSIS — S71.002A: ICD-10-CM

## 2023-05-12 DIAGNOSIS — L89.44 PRESSURE INJURY OF CONTIGUOUS REGION INVOLVING BACK AND RIGHT BUTTOCK, STAGE 4: ICD-10-CM

## 2023-05-12 LAB
ABS NEUT CALC (OHS): 5.74 X10(3)/MCL (ref 2.1–9.2)
ALBUMIN SERPL-MCNC: 2.2 G/DL (ref 3.5–5)
ALBUMIN SERPL-MCNC: 2.4 G/DL (ref 3.5–5)
ALBUMIN/GLOB SERPL: 0.5 RATIO (ref 1.1–2)
ALBUMIN/GLOB SERPL: 0.6 RATIO (ref 1.1–2)
ALP SERPL-CCNC: 111 UNIT/L (ref 40–150)
ALP SERPL-CCNC: 113 UNIT/L (ref 40–150)
ALT SERPL-CCNC: 10 UNIT/L (ref 0–55)
ALT SERPL-CCNC: 11 UNIT/L (ref 0–55)
ANISOCYTOSIS BLD QL SMEAR: ABNORMAL
AST SERPL-CCNC: 9 UNIT/L (ref 5–34)
AST SERPL-CCNC: 9 UNIT/L (ref 5–34)
BASOPHILS # BLD AUTO: 0.01 X10(3)/MCL
BASOPHILS NFR BLD AUTO: 0.1 %
BILIRUBIN DIRECT+TOT PNL SERPL-MCNC: 0.6 MG/DL
BILIRUBIN DIRECT+TOT PNL SERPL-MCNC: 0.6 MG/DL
BUN SERPL-MCNC: 32.9 MG/DL (ref 8.4–25.7)
BUN SERPL-MCNC: 34.2 MG/DL (ref 8.4–25.7)
CALCIUM SERPL-MCNC: 8.3 MG/DL (ref 8.4–10.2)
CALCIUM SERPL-MCNC: 8.7 MG/DL (ref 8.4–10.2)
CHLORIDE SERPL-SCNC: 100 MMOL/L (ref 98–107)
CHLORIDE SERPL-SCNC: 102 MMOL/L (ref 98–107)
CO2 SERPL-SCNC: 22 MMOL/L (ref 22–29)
CO2 SERPL-SCNC: 24 MMOL/L (ref 22–29)
CREAT SERPL-MCNC: 1.85 MG/DL (ref 0.73–1.18)
CREAT SERPL-MCNC: 1.88 MG/DL (ref 0.73–1.18)
ELLIPTOCYTOSIS (OHS): SLIGHT
EOSINOPHIL # BLD AUTO: 0.16 X10(3)/MCL (ref 0–0.9)
EOSINOPHIL NFR BLD AUTO: 2.4 %
EOSINOPHIL NFR BLD MANUAL: 0.17 X10(3)/MCL (ref 0–0.9)
EOSINOPHIL NFR BLD MANUAL: 2 % (ref 0–8)
ERYTHROCYTE [DISTWIDTH] IN BLOOD BY AUTOMATED COUNT: 23.8 % (ref 11.5–17)
ERYTHROCYTE [DISTWIDTH] IN BLOOD BY AUTOMATED COUNT: 24.1 % (ref 11.5–17)
GFR SERPLBLD CREATININE-BSD FMLA CKD-EPI: 42 MLS/MIN/1.73/M2
GFR SERPLBLD CREATININE-BSD FMLA CKD-EPI: 42 MLS/MIN/1.73/M2
GLOBULIN SER-MCNC: 4 GM/DL (ref 2.4–3.5)
GLOBULIN SER-MCNC: 4.5 GM/DL (ref 2.4–3.5)
GLUCOSE SERPL-MCNC: 160 MG/DL (ref 74–100)
GLUCOSE SERPL-MCNC: 180 MG/DL (ref 74–100)
HCT VFR BLD AUTO: 29.1 % (ref 42–52)
HCT VFR BLD AUTO: 31.3 % (ref 42–52)
HGB BLD-MCNC: 8.6 G/DL (ref 14–18)
HGB BLD-MCNC: 9.3 G/DL (ref 14–18)
HYPOCHROMIA BLD QL SMEAR: ABNORMAL
IMM GRANULOCYTES # BLD AUTO: 0.02 X10(3)/MCL (ref 0–0.04)
IMM GRANULOCYTES NFR BLD AUTO: 0.3 %
INR BLD: 1.52 (ref 0–1.3)
LYMPHOCYTES # BLD AUTO: 1.15 X10(3)/MCL (ref 0.6–4.6)
LYMPHOCYTES NFR BLD AUTO: 17 %
LYMPHOCYTES NFR BLD MANUAL: 2.19 X10(3)/MCL
LYMPHOCYTES NFR BLD MANUAL: 26 % (ref 13–40)
MACROCYTES BLD QL SMEAR: ABNORMAL
MAGNESIUM SERPL-MCNC: 2.2 MG/DL (ref 1.6–2.6)
MCH RBC QN AUTO: 21.4 PG (ref 27–31)
MCH RBC QN AUTO: 21.4 PG (ref 27–31)
MCHC RBC AUTO-ENTMCNC: 29.6 G/DL (ref 33–36)
MCHC RBC AUTO-ENTMCNC: 29.7 G/DL (ref 33–36)
MCV RBC AUTO: 72.1 FL (ref 80–94)
MCV RBC AUTO: 72.6 FL (ref 80–94)
MICROCYTES BLD QL SMEAR: ABNORMAL
MONOCYTES # BLD AUTO: 0.43 X10(3)/MCL (ref 0.1–1.3)
MONOCYTES NFR BLD AUTO: 6.4 %
MONOCYTES NFR BLD MANUAL: 0.34 X10(3)/MCL (ref 0.1–1.3)
MONOCYTES NFR BLD MANUAL: 4 % (ref 2–11)
NEUTROPHILS # BLD AUTO: 4.98 X10(3)/MCL (ref 2.1–9.2)
NEUTROPHILS NFR BLD AUTO: 73.8 %
NEUTROPHILS NFR BLD MANUAL: 68 % (ref 47–80)
PHOSPHATE SERPL-MCNC: 4.4 MG/DL (ref 2.3–4.7)
PLATELET # BLD AUTO: 288 X10(3)/MCL (ref 130–400)
PLATELET # BLD AUTO: 322 X10(3)/MCL (ref 130–400)
PLATELET # BLD EST: ABNORMAL 10*3/UL
PMV BLD AUTO: 8.5 FL (ref 7.4–10.4)
PMV BLD AUTO: 9.1 FL (ref 7.4–10.4)
POCT GLUCOSE: 184 MG/DL (ref 70–110)
POCT GLUCOSE: 238 MG/DL (ref 70–110)
POCT GLUCOSE: 249 MG/DL (ref 70–110)
POIKILOCYTOSIS BLD QL SMEAR: ABNORMAL
POTASSIUM SERPL-SCNC: 4.2 MMOL/L (ref 3.5–5.1)
POTASSIUM SERPL-SCNC: 4.4 MMOL/L (ref 3.5–5.1)
PROT SERPL-MCNC: 6.4 GM/DL (ref 6.4–8.3)
PROT SERPL-MCNC: 6.7 GM/DL (ref 6.4–8.3)
PROTHROMBIN TIME: 15 SECONDS (ref 12.5–14.5)
RBC # BLD AUTO: 4.01 X10(6)/MCL (ref 4.7–6.1)
RBC # BLD AUTO: 4.34 X10(6)/MCL (ref 4.7–6.1)
RBC MORPH BLD: ABNORMAL
SODIUM SERPL-SCNC: 134 MMOL/L (ref 136–145)
SODIUM SERPL-SCNC: 135 MMOL/L (ref 136–145)
SPHEROCYTES BLD QL SMEAR: ABNORMAL
STOMATOCYTES (OLG): ABNORMAL
TARGETS BLD QL SMEAR: ABNORMAL
WBC # SPEC AUTO: 6.75 X10(3)/MCL (ref 4.5–11.5)
WBC # SPEC AUTO: 8.44 X10(3)/MCL (ref 4.5–11.5)

## 2023-05-12 PROCEDURE — D9220A PRA ANESTHESIA: ICD-10-PCS | Mod: ,,, | Performed by: NURSE ANESTHETIST, CERTIFIED REGISTERED

## 2023-05-12 PROCEDURE — 36000707: Performed by: SURGERY

## 2023-05-12 PROCEDURE — 87070 CULTURE OTHR SPECIMN AEROBIC: CPT | Performed by: STUDENT IN AN ORGANIZED HEALTH CARE EDUCATION/TRAINING PROGRAM

## 2023-05-12 PROCEDURE — 25000003 PHARM REV CODE 250: Performed by: STUDENT IN AN ORGANIZED HEALTH CARE EDUCATION/TRAINING PROGRAM

## 2023-05-12 PROCEDURE — 97110 THERAPEUTIC EXERCISES: CPT

## 2023-05-12 PROCEDURE — 85610 PROTHROMBIN TIME: CPT | Performed by: STUDENT IN AN ORGANIZED HEALTH CARE EDUCATION/TRAINING PROGRAM

## 2023-05-12 PROCEDURE — 99900035 HC TECH TIME PER 15 MIN (STAT)

## 2023-05-12 PROCEDURE — 25000003 PHARM REV CODE 250: Performed by: NURSE ANESTHETIST, CERTIFIED REGISTERED

## 2023-05-12 PROCEDURE — D9220A PRA ANESTHESIA: Mod: ,,, | Performed by: NURSE ANESTHETIST, CERTIFIED REGISTERED

## 2023-05-12 PROCEDURE — 85027 COMPLETE CBC AUTOMATED: CPT | Performed by: STUDENT IN AN ORGANIZED HEALTH CARE EDUCATION/TRAINING PROGRAM

## 2023-05-12 PROCEDURE — 63600175 PHARM REV CODE 636 W HCPCS: Performed by: NURSE ANESTHETIST, CERTIFIED REGISTERED

## 2023-05-12 PROCEDURE — 36000706: Performed by: SURGERY

## 2023-05-12 PROCEDURE — 63600175 PHARM REV CODE 636 W HCPCS: Performed by: STUDENT IN AN ORGANIZED HEALTH CARE EDUCATION/TRAINING PROGRAM

## 2023-05-12 PROCEDURE — 80053 COMPREHEN METABOLIC PANEL: CPT | Performed by: STUDENT IN AN ORGANIZED HEALTH CARE EDUCATION/TRAINING PROGRAM

## 2023-05-12 PROCEDURE — A4216 STERILE WATER/SALINE, 10 ML: HCPCS | Performed by: STUDENT IN AN ORGANIZED HEALTH CARE EDUCATION/TRAINING PROGRAM

## 2023-05-12 PROCEDURE — 84100 ASSAY OF PHOSPHORUS: CPT | Performed by: STUDENT IN AN ORGANIZED HEALTH CARE EDUCATION/TRAINING PROGRAM

## 2023-05-12 PROCEDURE — 71000033 HC RECOVERY, INTIAL HOUR: Performed by: SURGERY

## 2023-05-12 PROCEDURE — 94760 N-INVAS EAR/PLS OXIMETRY 1: CPT

## 2023-05-12 PROCEDURE — 83735 ASSAY OF MAGNESIUM: CPT | Performed by: STUDENT IN AN ORGANIZED HEALTH CARE EDUCATION/TRAINING PROGRAM

## 2023-05-12 PROCEDURE — 37000008 HC ANESTHESIA 1ST 15 MINUTES: Performed by: SURGERY

## 2023-05-12 PROCEDURE — 27201423 OPTIME MED/SURG SUP & DEVICES STERILE SUPPLY: Performed by: SURGERY

## 2023-05-12 PROCEDURE — 37000009 HC ANESTHESIA EA ADD 15 MINS: Performed by: SURGERY

## 2023-05-12 PROCEDURE — 11000004 HC SNF PRIVATE

## 2023-05-12 RX ORDER — HYDROCODONE BITARTRATE AND ACETAMINOPHEN 500; 5 MG/1; MG/1
TABLET ORAL
Status: CANCELLED | OUTPATIENT
Start: 2023-05-12

## 2023-05-12 RX ORDER — SODIUM CHLORIDE 0.9 % (FLUSH) 0.9 %
10 SYRINGE (ML) INJECTION EVERY 6 HOURS
Status: CANCELLED | OUTPATIENT
Start: 2023-05-12

## 2023-05-12 RX ORDER — LIDOCAINE 40 MG/G
CREAM TOPICAL EVERY OTHER DAY
Status: CANCELLED | OUTPATIENT
Start: 2023-05-15

## 2023-05-12 RX ORDER — HYDROCODONE BITARTRATE AND ACETAMINOPHEN 5; 325 MG/1; MG/1
1 TABLET ORAL
Status: DISCONTINUED | OUTPATIENT
Start: 2023-05-12 | End: 2023-05-12

## 2023-05-12 RX ORDER — MEPERIDINE HYDROCHLORIDE 25 MG/ML
12.5 INJECTION INTRAMUSCULAR; INTRAVENOUS; SUBCUTANEOUS ONCE AS NEEDED
Status: DISCONTINUED | OUTPATIENT
Start: 2023-05-12 | End: 2023-05-12

## 2023-05-12 RX ORDER — DIPHENHYDRAMINE HYDROCHLORIDE 50 MG/ML
12.5 INJECTION INTRAMUSCULAR; INTRAVENOUS ONCE AS NEEDED
Status: COMPLETED | OUTPATIENT
Start: 2023-05-12 | End: 2023-05-16

## 2023-05-12 RX ORDER — SODIUM CHLORIDE 9 MG/ML
INJECTION, SOLUTION INTRAVENOUS CONTINUOUS
Status: DISCONTINUED | OUTPATIENT
Start: 2023-05-12 | End: 2023-05-12

## 2023-05-12 RX ORDER — MAG HYDROX/ALUMINUM HYD/SIMETH 200-200-20
30 SUSPENSION, ORAL (FINAL DOSE FORM) ORAL 4 TIMES DAILY PRN
Status: DISCONTINUED | OUTPATIENT
Start: 2023-05-12 | End: 2023-05-25 | Stop reason: HOSPADM

## 2023-05-12 RX ORDER — IPRATROPIUM BROMIDE 0.5 MG/2.5ML
0.5 SOLUTION RESPIRATORY (INHALATION) EVERY 6 HOURS PRN
Status: CANCELLED | OUTPATIENT
Start: 2023-05-12

## 2023-05-12 RX ORDER — LIDOCAINE 40 MG/G
CREAM TOPICAL EVERY OTHER DAY
Status: DISCONTINUED | OUTPATIENT
Start: 2023-05-15 | End: 2023-05-12 | Stop reason: HOSPADM

## 2023-05-12 RX ORDER — ONDANSETRON 2 MG/ML
4 INJECTION INTRAMUSCULAR; INTRAVENOUS DAILY PRN
Status: DISCONTINUED | OUTPATIENT
Start: 2023-05-12 | End: 2023-05-12

## 2023-05-12 RX ORDER — MIDAZOLAM HYDROCHLORIDE 1 MG/ML
INJECTION INTRAMUSCULAR; INTRAVENOUS
Status: DISCONTINUED | OUTPATIENT
Start: 2023-05-12 | End: 2023-05-12

## 2023-05-12 RX ORDER — CARVEDILOL 12.5 MG/1
12.5 TABLET ORAL DAILY
Status: DISCONTINUED | OUTPATIENT
Start: 2023-05-13 | End: 2023-05-15

## 2023-05-12 RX ORDER — DOXYLAMINE SUCCINATE 25 MG
TABLET ORAL EVERY 12 HOURS PRN
Status: CANCELLED | OUTPATIENT
Start: 2023-05-12

## 2023-05-12 RX ORDER — ACETAMINOPHEN 325 MG/1
650 TABLET ORAL EVERY 4 HOURS PRN
Status: DISCONTINUED | OUTPATIENT
Start: 2023-05-12 | End: 2023-05-25 | Stop reason: HOSPADM

## 2023-05-12 RX ORDER — ONDANSETRON 2 MG/ML
4 INJECTION INTRAMUSCULAR; INTRAVENOUS EVERY 12 HOURS PRN
Status: DISCONTINUED | OUTPATIENT
Start: 2023-05-12 | End: 2023-05-12

## 2023-05-12 RX ORDER — PHENYLEPHRINE HYDROCHLORIDE 10 MG/ML
INJECTION INTRAVENOUS
Status: DISCONTINUED | OUTPATIENT
Start: 2023-05-12 | End: 2023-05-12

## 2023-05-12 RX ORDER — DIPHENHYDRAMINE HYDROCHLORIDE 50 MG/ML
12.5 INJECTION INTRAMUSCULAR; INTRAVENOUS ONCE AS NEEDED
Status: DISCONTINUED | OUTPATIENT
Start: 2023-05-12 | End: 2023-05-12 | Stop reason: HOSPADM

## 2023-05-12 RX ORDER — ONDANSETRON 4 MG/1
8 TABLET, ORALLY DISINTEGRATING ORAL EVERY 8 HOURS PRN
Status: CANCELLED | OUTPATIENT
Start: 2023-05-12

## 2023-05-12 RX ORDER — INSULIN ASPART 100 [IU]/ML
7 INJECTION, SOLUTION INTRAVENOUS; SUBCUTANEOUS
Status: CANCELLED | OUTPATIENT
Start: 2023-05-12

## 2023-05-12 RX ORDER — POLYETHYLENE GLYCOL 3350 17 G/17G
17 POWDER, FOR SOLUTION ORAL 3 TIMES DAILY PRN
Status: CANCELLED | OUTPATIENT
Start: 2023-05-12

## 2023-05-12 RX ORDER — HYDROCODONE BITARTRATE AND ACETAMINOPHEN 500; 5 MG/1; MG/1
TABLET ORAL
Status: DISCONTINUED | OUTPATIENT
Start: 2023-05-12 | End: 2023-05-23

## 2023-05-12 RX ORDER — SODIUM CHLORIDE 0.9 % (FLUSH) 0.9 %
10 SYRINGE (ML) INJECTION EVERY 12 HOURS PRN
Status: DISCONTINUED | OUTPATIENT
Start: 2023-05-12 | End: 2023-05-25 | Stop reason: HOSPADM

## 2023-05-12 RX ORDER — SODIUM CHLORIDE 9 MG/ML
INJECTION, SOLUTION INTRAVENOUS CONTINUOUS
Status: CANCELLED | OUTPATIENT
Start: 2023-05-12

## 2023-05-12 RX ORDER — HYDROCODONE BITARTRATE AND ACETAMINOPHEN 10; 325 MG/1; MG/1
1 TABLET ORAL EVERY 4 HOURS PRN
Status: DISCONTINUED | OUTPATIENT
Start: 2023-05-12 | End: 2023-05-12 | Stop reason: HOSPADM

## 2023-05-12 RX ORDER — LIDOCAINE 40 MG/G
CREAM TOPICAL EVERY OTHER DAY
Status: DISCONTINUED | OUTPATIENT
Start: 2023-05-15 | End: 2023-05-25 | Stop reason: HOSPADM

## 2023-05-12 RX ORDER — ACETAMINOPHEN 325 MG/1
650 TABLET ORAL EVERY 4 HOURS PRN
Status: DISCONTINUED | OUTPATIENT
Start: 2023-05-12 | End: 2023-05-12

## 2023-05-12 RX ORDER — IBUPROFEN 200 MG
16 TABLET ORAL
Status: CANCELLED | OUTPATIENT
Start: 2023-05-12

## 2023-05-12 RX ORDER — ACETAMINOPHEN 325 MG/1
650 TABLET ORAL EVERY 4 HOURS PRN
Status: CANCELLED | OUTPATIENT
Start: 2023-05-12

## 2023-05-12 RX ORDER — DEXTROSE MONOHYDRATE 100 MG/ML
25 INJECTION, SOLUTION INTRAVENOUS
Status: DISCONTINUED | OUTPATIENT
Start: 2023-05-12 | End: 2023-05-23

## 2023-05-12 RX ORDER — SIMETHICONE 80 MG
1 TABLET,CHEWABLE ORAL 4 TIMES DAILY PRN
Status: DISCONTINUED | OUTPATIENT
Start: 2023-05-12 | End: 2023-05-25 | Stop reason: HOSPADM

## 2023-05-12 RX ORDER — DEXTROSE MONOHYDRATE 100 MG/ML
12.5 INJECTION, SOLUTION INTRAVENOUS
Status: DISCONTINUED | OUTPATIENT
Start: 2023-05-12 | End: 2023-05-25 | Stop reason: HOSPADM

## 2023-05-12 RX ORDER — ESCITALOPRAM OXALATE 5 MG/1
5 TABLET ORAL DAILY
Status: CANCELLED | OUTPATIENT
Start: 2023-05-13

## 2023-05-12 RX ORDER — ONDANSETRON 2 MG/ML
4 INJECTION INTRAMUSCULAR; INTRAVENOUS EVERY 8 HOURS PRN
Status: CANCELLED | OUTPATIENT
Start: 2023-05-12

## 2023-05-12 RX ORDER — IPRATROPIUM BROMIDE 0.5 MG/2.5ML
0.5 SOLUTION RESPIRATORY (INHALATION) EVERY 6 HOURS PRN
Status: DISCONTINUED | OUTPATIENT
Start: 2023-05-12 | End: 2023-05-25 | Stop reason: HOSPADM

## 2023-05-12 RX ORDER — SODIUM CHLORIDE 9 MG/ML
INJECTION, SOLUTION INTRAVENOUS CONTINUOUS
Status: DISCONTINUED | OUTPATIENT
Start: 2023-05-12 | End: 2023-05-12 | Stop reason: HOSPADM

## 2023-05-12 RX ORDER — IBUPROFEN 200 MG
16 TABLET ORAL
Status: DISCONTINUED | OUTPATIENT
Start: 2023-05-12 | End: 2023-05-25 | Stop reason: HOSPADM

## 2023-05-12 RX ORDER — DIPHENHYDRAMINE HYDROCHLORIDE 50 MG/ML
12.5 INJECTION INTRAMUSCULAR; INTRAVENOUS ONCE AS NEEDED
Status: CANCELLED | OUTPATIENT
Start: 2023-05-12 | End: 2034-10-08

## 2023-05-12 RX ORDER — CARVEDILOL 12.5 MG/1
12.5 TABLET ORAL DAILY
Status: CANCELLED | OUTPATIENT
Start: 2023-05-13

## 2023-05-12 RX ORDER — ONDANSETRON 4 MG/1
8 TABLET, ORALLY DISINTEGRATING ORAL EVERY 8 HOURS PRN
Status: DISCONTINUED | OUTPATIENT
Start: 2023-05-12 | End: 2023-05-25 | Stop reason: HOSPADM

## 2023-05-12 RX ORDER — IBUPROFEN 200 MG
24 TABLET ORAL
Status: DISCONTINUED | OUTPATIENT
Start: 2023-05-12 | End: 2023-05-25 | Stop reason: HOSPADM

## 2023-05-12 RX ORDER — NALOXONE HCL 0.4 MG/ML
0.02 VIAL (ML) INJECTION
Status: DISCONTINUED | OUTPATIENT
Start: 2023-05-12 | End: 2023-05-25 | Stop reason: HOSPADM

## 2023-05-12 RX ORDER — LIDOCAINE HYDROCHLORIDE 20 MG/ML
INJECTION INTRAVENOUS
Status: DISCONTINUED | OUTPATIENT
Start: 2023-05-12 | End: 2023-05-12

## 2023-05-12 RX ORDER — IBUPROFEN 200 MG
24 TABLET ORAL
Status: CANCELLED | OUTPATIENT
Start: 2023-05-12

## 2023-05-12 RX ORDER — MORPHINE SULFATE 4 MG/ML
2 INJECTION, SOLUTION INTRAMUSCULAR; INTRAVENOUS EVERY 6 HOURS PRN
Status: CANCELLED | OUTPATIENT
Start: 2023-05-12

## 2023-05-12 RX ORDER — HYDROCODONE BITARTRATE AND ACETAMINOPHEN 10; 325 MG/1; MG/1
1 TABLET ORAL EVERY 4 HOURS PRN
Status: CANCELLED | OUTPATIENT
Start: 2023-05-12

## 2023-05-12 RX ORDER — LOPERAMIDE HYDROCHLORIDE 2 MG/1
2 CAPSULE ORAL 4 TIMES DAILY PRN
Status: CANCELLED | OUTPATIENT
Start: 2023-05-12

## 2023-05-12 RX ORDER — TALC
9 POWDER (GRAM) TOPICAL NIGHTLY PRN
Status: CANCELLED | OUTPATIENT
Start: 2023-05-12

## 2023-05-12 RX ORDER — HYDROCODONE BITARTRATE AND ACETAMINOPHEN 5; 325 MG/1; MG/1
1 TABLET ORAL EVERY 6 HOURS PRN
Status: CANCELLED | OUTPATIENT
Start: 2023-05-12

## 2023-05-12 RX ORDER — SODIUM CHLORIDE 0.9 % (FLUSH) 0.9 %
10 SYRINGE (ML) INJECTION EVERY 12 HOURS PRN
Status: CANCELLED | OUTPATIENT
Start: 2023-05-12

## 2023-05-12 RX ORDER — LEVALBUTEROL INHALATION SOLUTION 1.25 MG/3ML
1.25 SOLUTION RESPIRATORY (INHALATION) EVERY 6 HOURS PRN
Status: DISCONTINUED | OUTPATIENT
Start: 2023-05-12 | End: 2023-05-25 | Stop reason: HOSPADM

## 2023-05-12 RX ORDER — INSULIN ASPART 100 [IU]/ML
1-10 INJECTION, SOLUTION INTRAVENOUS; SUBCUTANEOUS
Status: DISCONTINUED | OUTPATIENT
Start: 2023-05-12 | End: 2023-05-25 | Stop reason: HOSPADM

## 2023-05-12 RX ORDER — HYDROCODONE BITARTRATE AND ACETAMINOPHEN 5; 325 MG/1; MG/1
1 TABLET ORAL EVERY 4 HOURS PRN
Status: DISCONTINUED | OUTPATIENT
Start: 2023-05-12 | End: 2023-05-12

## 2023-05-12 RX ORDER — SODIUM CHLORIDE 0.9 % (FLUSH) 0.9 %
10 SYRINGE (ML) INJECTION EVERY 6 HOURS
Status: DISCONTINUED | OUTPATIENT
Start: 2023-05-12 | End: 2023-05-22

## 2023-05-12 RX ORDER — NALOXONE HCL 0.4 MG/ML
0.02 VIAL (ML) INJECTION
Status: CANCELLED | OUTPATIENT
Start: 2023-05-12

## 2023-05-12 RX ORDER — HYDROCODONE BITARTRATE AND ACETAMINOPHEN 10; 325 MG/1; MG/1
1 TABLET ORAL EVERY 4 HOURS PRN
Status: DISCONTINUED | OUTPATIENT
Start: 2023-05-12 | End: 2023-05-25 | Stop reason: HOSPADM

## 2023-05-12 RX ORDER — LOPERAMIDE HYDROCHLORIDE 2 MG/1
2 CAPSULE ORAL 4 TIMES DAILY PRN
Status: DISCONTINUED | OUTPATIENT
Start: 2023-05-12 | End: 2023-05-25 | Stop reason: HOSPADM

## 2023-05-12 RX ORDER — ONDANSETRON 2 MG/ML
INJECTION INTRAMUSCULAR; INTRAVENOUS
Status: DISCONTINUED | OUTPATIENT
Start: 2023-05-12 | End: 2023-05-12

## 2023-05-12 RX ORDER — BISACODYL 10 MG
10 SUPPOSITORY, RECTAL RECTAL DAILY PRN
Status: CANCELLED | OUTPATIENT
Start: 2023-05-12

## 2023-05-12 RX ORDER — PROPOFOL 10 MG/ML
VIAL (ML) INTRAVENOUS
Status: DISCONTINUED | OUTPATIENT
Start: 2023-05-12 | End: 2023-05-12

## 2023-05-12 RX ORDER — PROMETHAZINE HYDROCHLORIDE 25 MG/1
25 TABLET ORAL EVERY 6 HOURS PRN
Status: DISCONTINUED | OUTPATIENT
Start: 2023-05-12 | End: 2023-05-12

## 2023-05-12 RX ORDER — ONDANSETRON 2 MG/ML
4 INJECTION INTRAMUSCULAR; INTRAVENOUS EVERY 8 HOURS PRN
Status: DISCONTINUED | OUTPATIENT
Start: 2023-05-12 | End: 2023-05-25 | Stop reason: HOSPADM

## 2023-05-12 RX ORDER — LANOLIN ALCOHOL/MO/W.PET/CERES
1 CREAM (GRAM) TOPICAL DAILY
Status: CANCELLED | OUTPATIENT
Start: 2023-05-13

## 2023-05-12 RX ORDER — INSULIN ASPART 100 [IU]/ML
7 INJECTION, SOLUTION INTRAVENOUS; SUBCUTANEOUS
Status: DISCONTINUED | OUTPATIENT
Start: 2023-05-13 | End: 2023-05-14

## 2023-05-12 RX ORDER — METOPROLOL TARTRATE 1 MG/ML
INJECTION, SOLUTION INTRAVENOUS
Status: DISPENSED
Start: 2023-05-12 | End: 2023-05-12

## 2023-05-12 RX ORDER — HYDROCODONE BITARTRATE AND ACETAMINOPHEN 5; 325 MG/1; MG/1
1 TABLET ORAL EVERY 6 HOURS PRN
Status: DISCONTINUED | OUTPATIENT
Start: 2023-05-12 | End: 2023-05-14

## 2023-05-12 RX ORDER — POLYETHYLENE GLYCOL 3350 17 G/17G
17 POWDER, FOR SOLUTION ORAL 3 TIMES DAILY PRN
Status: DISCONTINUED | OUTPATIENT
Start: 2023-05-12 | End: 2023-05-25 | Stop reason: HOSPADM

## 2023-05-12 RX ORDER — EPINEPHRINE 0.1 MG/ML
INJECTION INTRAVENOUS
Status: DISCONTINUED | OUTPATIENT
Start: 2023-05-12 | End: 2023-05-12

## 2023-05-12 RX ORDER — SIMETHICONE 80 MG
1 TABLET,CHEWABLE ORAL 4 TIMES DAILY PRN
Status: CANCELLED | OUTPATIENT
Start: 2023-05-12

## 2023-05-12 RX ORDER — ESCITALOPRAM OXALATE 5 MG/1
5 TABLET ORAL DAILY
Status: DISCONTINUED | OUTPATIENT
Start: 2023-05-13 | End: 2023-05-25 | Stop reason: HOSPADM

## 2023-05-12 RX ORDER — FOLIC ACID 1 MG/1
2 TABLET ORAL DAILY
Status: CANCELLED | OUTPATIENT
Start: 2023-05-13

## 2023-05-12 RX ORDER — HYDRALAZINE HYDROCHLORIDE 20 MG/ML
10 INJECTION INTRAMUSCULAR; INTRAVENOUS EVERY 4 HOURS PRN
Status: CANCELLED | OUTPATIENT
Start: 2023-05-12

## 2023-05-12 RX ORDER — FENTANYL CITRATE 50 UG/ML
25 INJECTION, SOLUTION INTRAMUSCULAR; INTRAVENOUS EVERY 5 MIN PRN
Status: DISCONTINUED | OUTPATIENT
Start: 2023-05-12 | End: 2023-05-12 | Stop reason: HOSPADM

## 2023-05-12 RX ORDER — DOXYLAMINE SUCCINATE 25 MG
TABLET ORAL EVERY 12 HOURS PRN
Status: DISCONTINUED | OUTPATIENT
Start: 2023-05-12 | End: 2023-05-25 | Stop reason: HOSPADM

## 2023-05-12 RX ORDER — FOLIC ACID 1 MG/1
2 TABLET ORAL DAILY
Status: DISCONTINUED | OUTPATIENT
Start: 2023-05-13 | End: 2023-05-25 | Stop reason: HOSPADM

## 2023-05-12 RX ORDER — EPHEDRINE SULFATE 50 MG/ML
INJECTION, SOLUTION INTRAVENOUS
Status: DISCONTINUED | OUTPATIENT
Start: 2023-05-12 | End: 2023-05-12

## 2023-05-12 RX ORDER — BISACODYL 10 MG
10 SUPPOSITORY, RECTAL RECTAL DAILY PRN
Status: DISCONTINUED | OUTPATIENT
Start: 2023-05-12 | End: 2023-05-25 | Stop reason: HOSPADM

## 2023-05-12 RX ORDER — INSULIN ASPART 100 [IU]/ML
1-10 INJECTION, SOLUTION INTRAVENOUS; SUBCUTANEOUS
Status: CANCELLED | OUTPATIENT
Start: 2023-05-12

## 2023-05-12 RX ORDER — MAG HYDROX/ALUMINUM HYD/SIMETH 200-200-20
30 SUSPENSION, ORAL (FINAL DOSE FORM) ORAL 4 TIMES DAILY PRN
Status: CANCELLED | OUTPATIENT
Start: 2023-05-12

## 2023-05-12 RX ORDER — MORPHINE SULFATE 4 MG/ML
2 INJECTION, SOLUTION INTRAMUSCULAR; INTRAVENOUS EVERY 6 HOURS PRN
Status: DISCONTINUED | OUTPATIENT
Start: 2023-05-12 | End: 2023-05-23

## 2023-05-12 RX ORDER — HYDRALAZINE HYDROCHLORIDE 20 MG/ML
10 INJECTION INTRAMUSCULAR; INTRAVENOUS EVERY 4 HOURS PRN
Status: DISCONTINUED | OUTPATIENT
Start: 2023-05-12 | End: 2023-05-25 | Stop reason: HOSPADM

## 2023-05-12 RX ORDER — LEVALBUTEROL INHALATION SOLUTION 1.25 MG/3ML
1.25 SOLUTION RESPIRATORY (INHALATION) EVERY 6 HOURS PRN
Status: CANCELLED | OUTPATIENT
Start: 2023-05-12

## 2023-05-12 RX ORDER — LANOLIN ALCOHOL/MO/W.PET/CERES
1 CREAM (GRAM) TOPICAL DAILY
Status: DISCONTINUED | OUTPATIENT
Start: 2023-05-13 | End: 2023-05-12 | Stop reason: HOSPADM

## 2023-05-12 RX ORDER — METOPROLOL TARTRATE 1 MG/ML
5 INJECTION, SOLUTION INTRAVENOUS ONCE
Status: DISCONTINUED | OUTPATIENT
Start: 2023-05-12 | End: 2023-05-12 | Stop reason: HOSPADM

## 2023-05-12 RX ORDER — TALC
9 POWDER (GRAM) TOPICAL NIGHTLY PRN
Status: DISCONTINUED | OUTPATIENT
Start: 2023-05-12 | End: 2023-05-25 | Stop reason: HOSPADM

## 2023-05-12 RX ORDER — LANOLIN ALCOHOL/MO/W.PET/CERES
1 CREAM (GRAM) TOPICAL DAILY
Status: DISCONTINUED | OUTPATIENT
Start: 2023-05-13 | End: 2023-05-25 | Stop reason: HOSPADM

## 2023-05-12 RX ADMIN — INSULIN DETEMIR 15 UNITS: 100 INJECTION, SOLUTION SUBCUTANEOUS at 08:05

## 2023-05-12 RX ADMIN — LIDOCAINE HYDROCHLORIDE 25 MG: 20 INJECTION, SOLUTION INTRAVENOUS at 09:05

## 2023-05-12 RX ADMIN — CARVEDILOL 12.5 MG: 12.5 TABLET, FILM COATED ORAL at 06:05

## 2023-05-12 RX ADMIN — Medication 10 ML: at 07:05

## 2023-05-12 RX ADMIN — PROPOFOL 100 MG: 10 INJECTION, EMULSION INTRAVENOUS at 09:05

## 2023-05-12 RX ADMIN — SODIUM CHLORIDE: 9 INJECTION, SOLUTION INTRAVENOUS at 12:05

## 2023-05-12 RX ADMIN — EPHEDRINE SULFATE 10 MG: 50 INJECTION INTRAVENOUS at 09:05

## 2023-05-12 RX ADMIN — SODIUM CHLORIDE: 9 INJECTION, SOLUTION INTRAVENOUS at 09:05

## 2023-05-12 RX ADMIN — SODIUM CHLORIDE: 9 INJECTION, SOLUTION INTRAVENOUS at 08:05

## 2023-05-12 RX ADMIN — SITAGLIPTIN 100 MG: 50 TABLET, FILM COATED ORAL at 06:05

## 2023-05-12 RX ADMIN — EPHEDRINE SULFATE 15 MG: 50 INJECTION INTRAVENOUS at 09:05

## 2023-05-12 RX ADMIN — EPINEPHRINE 10 MCG: 0.1 INJECTION INTRACARDIAC; INTRAVENOUS at 09:05

## 2023-05-12 RX ADMIN — PHENYLEPHRINE HYDROCHLORIDE 100 MCG: 10 INJECTION INTRAVENOUS at 09:05

## 2023-05-12 RX ADMIN — Medication 10 ML: at 11:05

## 2023-05-12 RX ADMIN — MIDAZOLAM 1 MG: 1 INJECTION INTRAMUSCULAR; INTRAVENOUS at 09:05

## 2023-05-12 RX ADMIN — RIVAROXABAN 20 MG: 20 TABLET, FILM COATED ORAL at 04:05

## 2023-05-12 RX ADMIN — SODIUM CHLORIDE 125 MG: 9 INJECTION, SOLUTION INTRAVENOUS at 12:05

## 2023-05-12 RX ADMIN — FOLIC ACID 2 MG: 1 TABLET ORAL at 06:05

## 2023-05-12 RX ADMIN — HYDRALAZINE HYDROCHLORIDE 10 MG: 20 INJECTION INTRAMUSCULAR; INTRAVENOUS at 03:05

## 2023-05-12 RX ADMIN — HYDROCODONE BITARTRATE AND ACETAMINOPHEN 1 TABLET: 10; 325 TABLET ORAL at 10:05

## 2023-05-12 RX ADMIN — INSULIN ASPART 7 UNITS: 100 INJECTION, SOLUTION INTRAVENOUS; SUBCUTANEOUS at 04:05

## 2023-05-12 RX ADMIN — SODIUM CHLORIDE, PRESERVATIVE FREE 10 ML: 5 INJECTION INTRAVENOUS at 11:05

## 2023-05-12 RX ADMIN — PIPERACILLIN SODIUM AND TAZOBACTAM SODIUM 4.5 G: 4; .5 INJECTION, POWDER, LYOPHILIZED, FOR SOLUTION INTRAVENOUS at 07:05

## 2023-05-12 RX ADMIN — PIPERACILLIN AND TAZOBACTAM 4.5 G: 4; .5 INJECTION, POWDER, LYOPHILIZED, FOR SOLUTION INTRAVENOUS; PARENTERAL at 08:05

## 2023-05-12 RX ADMIN — ESCITALOPRAM 5 MG: 5 TABLET, FILM COATED ORAL at 06:05

## 2023-05-12 RX ADMIN — ONDANSETRON 4 MG: 2 INJECTION INTRAMUSCULAR; INTRAVENOUS at 09:05

## 2023-05-12 RX ADMIN — INSULIN ASPART 2 UNITS: 100 INJECTION, SOLUTION INTRAVENOUS; SUBCUTANEOUS at 08:05
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
## 2023-05-12 NOTE — BRIEF OP NOTE
[facility] - Medical Surgical Unit  Brief Operative Note    Surgery Date: 5/12/2023     Surgeon(s) and Role:     * Keyonna Jean MD - Primary    Assisting Surgeon: None    Pre-op Diagnosis:  Decubitus ulcer of buttock, stage 4 [L89.304]  Pressure injury of right ischium, stage 4 [L89.314]  Decubitus ulcer of trochanteric region of left hip, stage 4 [L89.224]    Post-op Diagnosis:  Post-Op Diagnosis Codes:     * Decubitus ulcer of buttock, stage 4 [L89.304]     * Pressure injury of right ischium, stage 4 [L89.314]     * Decubitus ulcer of trochanteric region of left hip, stage 4 [L89.224]    Procedure(s) (LRB):  DEBRIDEMENT, BUTTOCK (Right)    Anesthesia: General    Operative Findings: necrotic tissue in both hip wounds    Estimated Blood Loss: * No values recorded between 5/12/2023  9:23 AM and 5/12/2023  9:46 AM *         Specimens:   Specimen (24h ago, onward)      None              Discharge Note    OUTCOME: Patient tolerated treatment/procedure well without complication and is now ready for discharge.    DISPOSITION: Admitted as an Inpatient    FINAL DIAGNOSIS:  Atrial flutter    FOLLOWUP: None    DISCHARGE INSTRUCTIONS:  No discharge procedures on file.

## 2023-05-12 NOTE — ANESTHESIA PROCEDURE NOTES
Intubation    Date/Time: 5/12/2023 9:12 AM  Performed by: Danielito Mccann CRNA  Authorized by: Danielito Mccann CRNA     Intubation:     Induction:  Intravenous    Intubated:  Postinduction    Mask Ventilation:  Not attempted    Attempts:  1    Attempted By:  CRNA    Difficult Airway Encountered?: No      Complications:  None    Airway Device:  Supraglottic airway/LMA    Airway Device Size:  4.0    Placement Verified By:  Capnometry    Complicating Factors:  None    Findings Post-Intubation:  BS equal bilateral

## 2023-05-12 NOTE — TRANSFER OF CARE
"Anesthesia Transfer of Care Note    Patient: Antonio Malloytis II    Procedure(s) Performed: Procedure(s) (LRB):  DEBRIDEMENT, BUTTOCK (Right)    Patient location: PACU    Anesthesia Type: general    Transport from OR: Transported from OR on room air with adequate spontaneous ventilation    Post pain: adequate analgesia    Post assessment: no apparent anesthetic complications    Post vital signs: stable    Level of consciousness: sedated    Nausea/Vomiting: no nausea/vomiting    Complications: none    Transfer of care protocol was followedComments: /87  HR 84  RR 16  O2 Sat 98  Temp 36.4      Last vitals:   Visit Vitals  BP (!) 60/44   Pulse 66   Temp 36.1 °C (97 °F) (Temporal)   Resp 20   Ht 5' 8" (1.727 m)   Wt 100.7 kg (222 lb)   SpO2 96%   BMI 33.75 kg/m²     "
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
## 2023-05-12 NOTE — OP NOTE
DEBRIDEMENT, BUTTOCK  Procedure Note    Antonio Malloycosme II  5/12/2023      Pre-op Diagnosis: Decubitus ulcer of buttock, stage 4 [L89.304]  Pressure injury of right ischium, stage 4 [L89.314]  Decubitus ulcer of trochanteric region of left hip, stage 4 [L89.224]       Post-op Diagnosis: Post-Op Diagnosis Codes:     * Decubitus ulcer of buttock, stage 4 [L89.304]     * Pressure injury of right ischium, stage 4 [L89.314]     * Decubitus ulcer of trochanteric region of left hip, stage 4 [L89.224]    Procedure(s):  DEBRIDEMENT, BUTTOCK    Surgeon(s):  Keyonna Jean MD    Anesthesia: General    Staff:   Circulator: Christiano Hills RN; Leesa Jeff RN  Scrub Person: ST Dana    Estimated Blood Loss: Minimal                 Specimens:   Specimens (From admission, onward)      None                   Drains: none   Procedure in detail  After obtaining consent the patient was taken to the OR and placed under general anesthesia. The left hip area was prepped and drapped in a sterile fashion and excisional debridment was performed using a scalpel and ronguer removing nonviable skin and muscle down to viable muscle with a post debridment measuremnet of 6 x 4 x 4 cm hemostasis was assured with the cautery and mesalt packing and a cover dressing was applied. Tissue cultures were obtained and sent to lab. The patine was then reposition=ed and the right hip area was prepped and drapped after the wound vac dressing was removed. Excional debridment was performed using a scalpel and rongeur removing nonviable sking sub q and muscle down to viable muscle with a post debridment measuremnt of 11 x 14 x 3 cm. Hemostasis was assured with the cautery and a mesalt dressing was applied to the wound,. The patient tolerated the procedure well and was transferred to the PACU         Keyonna Jean     Date: 5/12/2023  Time: 9:47 AM

## 2023-05-12 NOTE — PROGRESS NOTES
Pt examined chart rviewed  he has unstageable pressure injuries tot he right and left hip and buttoci areas which will require surgical debridment consents have been obtained and we will proceed
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
## 2023-05-12 NOTE — PT/OT/SLP PROGRESS
Physical Therapy Treatment Note           Name: Antonio Galvan II    : 1965 (57 y.o.)  MRN: [id number]           TREATMENT SUMMARY AND RECOMMENDATIONS:    PT Received On: 23  PT Start Time: 1403     PT Stop Time: 1438  PT Total Time (min): 35 min     Subjective Assessment:   No complaints  Lethargic   x Awake, alert, cooperative  Uncooperative    Agitated  c/o pain    Appropriate  c/o fatigue    Confused x Treated at bedside     Emotionally labile  Treated in gym/dept.    Impulsive  Other:    Flat affect       Therapy Precautions:    Cognitive deficits  Spinal precautions    Collar - hard  Sternal precautions    Collar - soft   TLSO    Fall risk  LSO    Hip precautions - posterior  Knee immobilizer    Hip precautions - anterior  WBAT    Impaired communication  Partial weightbearing    Oxygen  TTWB    PEG tube  NWB    Visual deficits  Other:    Hearing deficits          Treatment Objectives:     Mobility Training:   Assist level Comments    Bed mobility     Transfer     Gait     Sit to stand transitions     Sitting balance     Standing balance      Wheelchair mobility     Car transfer     Other:          Therapeutic Exercise:   Exercise Sets Reps Comments   PROM to B LE and UE  20 All functional planes to end range                         Additional Comments:  Pt with increased HR and BP with ROM tasks. MD ok'd OOB 3 x/day for 1 hr at a time. Sitters/caregivers ok'd to do this if pt wants to get OOB. Pt reported today it is too much work for only one hour. PT to continue with ROM tasks     Assessment: Patient tolerated session well.    PT Plan: continue per POC  Revisions made to plan of care: No    GOALS:   Multidisciplinary Problems       Physical Therapy Goals          Problem: Physical Therapy    Goal Priority Disciplines Outcome Goal Variances Interventions   Physical Therapy Goal     PT, PT/OT Ongoing, Progressing     Description: Goals to be met by: Discharge     Patient will increase  functional independence with mobility by performin. Pt to be able to tolerate ROM tasks to B UE and LE 2 x/day with 25% improvement/carryover demonstrated allowing for improved positioning to reduce risk of further skin break down.                          Skilled PT Minutes Provided: 30   Communication with Treatment Team:     Equipment recommendations:       At end of treatment, patient remained:   Up in chair     Up in wheelchair in room   x In bed    With alarm activated    Bed rails up   x Call bell in reach    x Family/friends present    Restraints secured properly    In bathroom with CNA/RN notified    Nurse aware    In gym with therapist/tech    Other:

## 2023-05-12 NOTE — PLAN OF CARE
Problem: Adult Inpatient Plan of Care  Goal: Plan of Care Review  Outcome: Ongoing, Progressing  Flowsheets (Taken 5/12/2023 0534)  Plan of Care Reviewed With:   patient   caregiver   family  Goal: Patient-Specific Goal (Individualized)  Outcome: Ongoing, Progressing  Flowsheets (Taken 5/12/2023 0534)  Anxieties, Fears or Concerns: Wound healing, management of blood pressure and heart rate  Individualized Care Needs: Stabilize BP and HR during this shift [id number], Prepare patient for procedure  Goal: Absence of Hospital-Acquired Illness or Injury  Outcome: Ongoing, Progressing  Goal: Optimal Comfort and Wellbeing  Outcome: Ongoing, Progressing  Goal: Readiness for Transition of Care  Outcome: Ongoing, Progressing     Problem: Diabetes Comorbidity  Goal: Blood Glucose Level Within Targeted Range  Outcome: Ongoing, Progressing     Problem: Adjustment to Illness (Sepsis/Septic Shock)  Goal: Optimal Coping  Outcome: Ongoing, Progressing     Problem: Bleeding (Sepsis/Septic Shock)  Goal: Absence of Bleeding  Outcome: Ongoing, Progressing     Problem: Glycemic Control Impaired (Sepsis/Septic Shock)  Goal: Blood Glucose Level Within Desired Range  Outcome: Ongoing, Progressing     Problem: Infection Progression (Sepsis/Septic Shock)  Goal: Absence of Infection Signs and Symptoms  Outcome: Ongoing, Progressing     Problem: Nutrition Impaired (Sepsis/Septic Shock)  Goal: Optimal Nutrition Intake  Outcome: Ongoing, Progressing     Problem: Impaired Wound Healing  Goal: Optimal Wound Healing  Outcome: Ongoing, Progressing     Problem: Skin Injury Risk Increased  Goal: Skin Health and Integrity  Outcome: Ongoing, Progressing     Problem: Infection  Goal: Absence of Infection Signs and Symptoms  Outcome: Ongoing, Progressing     Problem: Fall Injury Risk  Goal: Absence of Fall and Fall-Related Injury  Outcome: Ongoing, Progressing

## 2023-05-12 NOTE — ANESTHESIA POSTPROCEDURE EVALUATION
Anesthesia Post Evaluation    Patient: Antonio Dumontlantis II    Procedure(s) Performed: Procedure(s) (LRB):  DEBRIDEMENT, BUTTOCK (Right)    Final Anesthesia Type: general      Patient location during evaluation: PACU  Patient participation: Yes- Able to Participate  Level of consciousness: awake and alert and oriented  Post-procedure vital signs: reviewed and stable  Pain management: adequate  Airway patency: patent    PONV status at discharge: No PONV  Anesthetic complications: no      Cardiovascular status: stable  Respiratory status: unassisted and spontaneous ventilation  Hydration status: euvolemic  Follow-up not needed.                                        Vitals shown include unvalidated device data.      No case tracking events are documented in the log.      Pain/Allison Score: No data recorded

## 2023-05-13 LAB
ALBUMIN SERPL-MCNC: 2.1 G/DL (ref 3.5–5)
ALBUMIN/GLOB SERPL: 0.5 RATIO (ref 1.1–2)
ALP SERPL-CCNC: 106 UNIT/L (ref 40–150)
ALT SERPL-CCNC: 10 UNIT/L (ref 0–55)
AST SERPL-CCNC: 7 UNIT/L (ref 5–34)
BACTERIA BLD CULT: NORMAL
BASOPHILS # BLD AUTO: 0.01 X10(3)/MCL
BASOPHILS NFR BLD AUTO: 0.1 %
BILIRUBIN DIRECT+TOT PNL SERPL-MCNC: 0.5 MG/DL
BUN SERPL-MCNC: 36.6 MG/DL (ref 8.4–25.7)
CALCIUM SERPL-MCNC: 8.5 MG/DL (ref 8.4–10.2)
CHLORIDE SERPL-SCNC: 101 MMOL/L (ref 98–107)
CO2 SERPL-SCNC: 25 MMOL/L (ref 22–29)
CREAT SERPL-MCNC: 2.11 MG/DL (ref 0.73–1.18)
EOSINOPHIL # BLD AUTO: 0.22 X10(3)/MCL (ref 0–0.9)
EOSINOPHIL NFR BLD AUTO: 3 %
ERYTHROCYTE [DISTWIDTH] IN BLOOD BY AUTOMATED COUNT: 24.7 % (ref 11.5–17)
GFR SERPLBLD CREATININE-BSD FMLA CKD-EPI: 36 MLS/MIN/1.73/M2
GLOBULIN SER-MCNC: 4.2 GM/DL (ref 2.4–3.5)
GLUCOSE SERPL-MCNC: 210 MG/DL (ref 74–100)
HCT VFR BLD AUTO: 25.3 % (ref 42–52)
HCT VFR BLD AUTO: 26.1 % (ref 42–52)
HGB BLD-MCNC: 7.4 G/DL (ref 14–18)
HGB BLD-MCNC: 7.6 G/DL (ref 14–18)
IMM GRANULOCYTES # BLD AUTO: 0.02 X10(3)/MCL (ref 0–0.04)
IMM GRANULOCYTES NFR BLD AUTO: 0.3 %
LYMPHOCYTES # BLD AUTO: 1.68 X10(3)/MCL (ref 0.6–4.6)
LYMPHOCYTES NFR BLD AUTO: 23.1 %
MCH RBC QN AUTO: 21.8 PG (ref 27–31)
MCHC RBC AUTO-ENTMCNC: 29.2 G/DL (ref 33–36)
MCV RBC AUTO: 74.4 FL (ref 80–94)
MONOCYTES # BLD AUTO: 0.51 X10(3)/MCL (ref 0.1–1.3)
MONOCYTES NFR BLD AUTO: 7 %
NEUTROPHILS # BLD AUTO: 4.84 X10(3)/MCL (ref 2.1–9.2)
NEUTROPHILS NFR BLD AUTO: 66.5 %
PLATELET # BLD AUTO: 264 X10(3)/MCL (ref 130–400)
PMV BLD AUTO: 9.3 FL (ref 7.4–10.4)
POCT GLUCOSE: 131 MG/DL (ref 70–110)
POCT GLUCOSE: 150 MG/DL (ref 70–110)
POCT GLUCOSE: 194 MG/DL (ref 70–110)
POCT GLUCOSE: 198 MG/DL (ref 70–110)
POTASSIUM SERPL-SCNC: 4.5 MMOL/L (ref 3.5–5.1)
PROT SERPL-MCNC: 6.3 GM/DL (ref 6.4–8.3)
RBC # BLD AUTO: 3.4 X10(6)/MCL (ref 4.7–6.1)
SODIUM SERPL-SCNC: 135 MMOL/L (ref 136–145)
WBC # SPEC AUTO: 7.28 X10(3)/MCL (ref 4.5–11.5)

## 2023-05-13 PROCEDURE — 94760 N-INVAS EAR/PLS OXIMETRY 1: CPT

## 2023-05-13 PROCEDURE — 85025 COMPLETE CBC W/AUTO DIFF WBC: CPT | Performed by: STUDENT IN AN ORGANIZED HEALTH CARE EDUCATION/TRAINING PROGRAM

## 2023-05-13 PROCEDURE — A4216 STERILE WATER/SALINE, 10 ML: HCPCS | Performed by: STUDENT IN AN ORGANIZED HEALTH CARE EDUCATION/TRAINING PROGRAM

## 2023-05-13 PROCEDURE — 99900035 HC TECH TIME PER 15 MIN (STAT)

## 2023-05-13 PROCEDURE — 97530 THERAPEUTIC ACTIVITIES: CPT

## 2023-05-13 PROCEDURE — 80053 COMPREHEN METABOLIC PANEL: CPT | Performed by: STUDENT IN AN ORGANIZED HEALTH CARE EDUCATION/TRAINING PROGRAM

## 2023-05-13 PROCEDURE — 63600175 PHARM REV CODE 636 W HCPCS: Performed by: STUDENT IN AN ORGANIZED HEALTH CARE EDUCATION/TRAINING PROGRAM

## 2023-05-13 PROCEDURE — 11000004 HC SNF PRIVATE

## 2023-05-13 PROCEDURE — 85014 HEMATOCRIT: CPT | Performed by: STUDENT IN AN ORGANIZED HEALTH CARE EDUCATION/TRAINING PROGRAM

## 2023-05-13 PROCEDURE — 25000003 PHARM REV CODE 250: Performed by: STUDENT IN AN ORGANIZED HEALTH CARE EDUCATION/TRAINING PROGRAM

## 2023-05-13 PROCEDURE — 97110 THERAPEUTIC EXERCISES: CPT

## 2023-05-13 RX ORDER — LOPERAMIDE HYDROCHLORIDE 2 MG/1
2 CAPSULE ORAL DAILY
Status: DISCONTINUED | OUTPATIENT
Start: 2023-05-13 | End: 2023-05-25 | Stop reason: HOSPADM

## 2023-05-13 RX ADMIN — FERROUS SULFATE TAB 325 MG (65 MG ELEMENTAL FE) 1 EACH: 325 (65 FE) TAB at 09:05

## 2023-05-13 RX ADMIN — HYDROCODONE BITARTRATE AND ACETAMINOPHEN 1 TABLET: 10; 325 TABLET ORAL at 11:05

## 2023-05-13 RX ADMIN — ESCITALOPRAM 5 MG: 5 TABLET, FILM COATED ORAL at 09:05

## 2023-05-13 RX ADMIN — INSULIN ASPART 7 UNITS: 100 INJECTION, SOLUTION INTRAVENOUS; SUBCUTANEOUS at 11:05

## 2023-05-13 RX ADMIN — FOLIC ACID 1 MG: 1 TABLET ORAL at 09:05

## 2023-05-13 RX ADMIN — HYDRALAZINE HYDROCHLORIDE 10 MG: 20 INJECTION, SOLUTION INTRAMUSCULAR; INTRAVENOUS at 07:05

## 2023-05-13 RX ADMIN — SODIUM CHLORIDE, PRESERVATIVE FREE 10 ML: 5 INJECTION INTRAVENOUS at 11:05

## 2023-05-13 RX ADMIN — INSULIN ASPART 7 UNITS: 100 INJECTION, SOLUTION INTRAVENOUS; SUBCUTANEOUS at 06:05

## 2023-05-13 RX ADMIN — SODIUM CHLORIDE, PRESERVATIVE FREE 10 ML: 5 INJECTION INTRAVENOUS at 05:05

## 2023-05-13 RX ADMIN — INSULIN ASPART 7 UNITS: 100 INJECTION, SOLUTION INTRAVENOUS; SUBCUTANEOUS at 01:05

## 2023-05-13 RX ADMIN — INSULIN DETEMIR 15 UNITS: 100 INJECTION, SOLUTION SUBCUTANEOUS at 08:05

## 2023-05-13 RX ADMIN — CARVEDILOL 12.5 MG: 12.5 TABLET, FILM COATED ORAL at 09:05

## 2023-05-13 RX ADMIN — INSULIN ASPART 1 UNITS: 100 INJECTION, SOLUTION INTRAVENOUS; SUBCUTANEOUS at 06:05

## 2023-05-13 RX ADMIN — HYDROCODONE BITARTRATE AND ACETAMINOPHEN 1 TABLET: 10; 325 TABLET ORAL at 03:05

## 2023-05-13 RX ADMIN — PIPERACILLIN AND TAZOBACTAM 4.5 G: 4; .5 INJECTION, POWDER, LYOPHILIZED, FOR SOLUTION INTRAVENOUS; PARENTERAL at 08:05

## 2023-05-13 RX ADMIN — SODIUM CHLORIDE, PRESERVATIVE FREE 10 ML: 5 INJECTION INTRAVENOUS at 06:05

## 2023-05-13 RX ADMIN — SODIUM CHLORIDE, PRESERVATIVE FREE 10 ML: 5 INJECTION INTRAVENOUS at 12:05

## 2023-05-13 RX ADMIN — SITAGLIPTIN 100 MG: 50 TABLET, FILM COATED ORAL at 09:05

## 2023-05-13 RX ADMIN — LOPERAMIDE HYDROCHLORIDE 2 MG: 2 CAPSULE ORAL at 09:05

## 2023-05-13 RX ADMIN — COLLAGENASE SANTYL: 250 OINTMENT TOPICAL at 09:05

## 2023-05-13 RX ADMIN — INSULIN ASPART 7 UNITS: 100 INJECTION, SOLUTION INTRAVENOUS; SUBCUTANEOUS at 09:05

## 2023-05-13 RX ADMIN — RIVAROXABAN 20 MG: 20 TABLET, FILM COATED ORAL at 06:05

## 2023-05-13 RX ADMIN — PIPERACILLIN AND TAZOBACTAM 4.5 G: 4; .5 INJECTION, POWDER, LYOPHILIZED, FOR SOLUTION INTRAVENOUS; PARENTERAL at 09:05

## 2023-05-13 NOTE — PT/OT/SLP PROGRESS
Physical Therapy Treatment Note           Name: Antonio Galvan II    : 1965 (57 y.o.)  MRN: [id number]           TREATMENT SUMMARY AND RECOMMENDATIONS:    PT Received On: 23  PT Start Time: 1100     PT Stop Time: 1130  PT Total Time (min): 30 min     Subjective Assessment:   No complaints  Lethargic   x Awake, alert, cooperative  Uncooperative    Agitated  c/o pain    Appropriate  c/o fatigue    Confused x Treated at bedside     Emotionally labile  Treated in gym/dept.    Impulsive  Other:    Flat affect       Therapy Precautions:    Cognitive deficits  Spinal precautions    Collar - hard  Sternal precautions    Collar - soft   TLSO    Fall risk  LSO    Hip precautions - posterior  Knee immobilizer    Hip precautions - anterior  WBAT    Impaired communication  Partial weightbearing    Oxygen  TTWB    PEG tube  NWB    Visual deficits  Other:    Hearing deficits          Treatment Objectives:     Mobility Training:   Assist level Comments    Bed mobility Total A x2 Scooting in bed, rolling L and R to assess skin integrity with nursing   Transfer     Gait     Sit to stand transitions     Sitting balance     Standing balance      Wheelchair mobility     Car transfer     Other:          Therapeutic Exercise:   Exercise Sets Reps Comments   PROM to BUE, BLE  20 All functional planes to end range                         Additional Comments:  Tolerated ROM fairly well. PT assisted nursing with rolling to assess skin integrity per pt's request.     Assessment: Patient tolerated session well.    PT Plan: cont POC  Revisions made to plan of care: No    GOALS:   Multidisciplinary Problems       Physical Therapy Goals       Not on file                    Skilled PT Minutes Provided: 30   Communication with Treatment Team:     Equipment recommendations:       At end of treatment, patient remained:   Up in chair     Up in wheelchair in room   x In bed   x With alarm activated   x Bed rails up   x Call bell  in reach    x Family/friends present    Restraints secured properly    In bathroom with CNA/RN notified   x Nurse aware    In gym with therapist/tech    Other:
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
## 2023-05-13 NOTE — PLAN OF CARE
Problem: Adult Inpatient Plan of Care  Goal: Patient-Specific Goal (Individualized)  Outcome: Ongoing, Progressing  Flowsheets (Taken 5/12/2023 2100)  Anxieties, Fears or Concerns: Healing of wounds  Individualized Care Needs:   Antibiotic therapy   Wound care     Problem: Diabetes Comorbidity  Goal: Blood Glucose Level Within Targeted Range  Outcome: Ongoing, Progressing  Intervention: Monitor and Manage Glycemia  Flowsheets (Taken 5/12/2023 2100)  Glycemic Management:   blood glucose monitored   supplemental insulin given     Problem: Glycemic Control Impaired (Sepsis/Septic Shock)  Goal: Blood Glucose Level Within Desired Range  Outcome: Ongoing, Progressing  Intervention: Optimize Glycemic Control  Flowsheets (Taken 5/12/2023 2100)  Glycemic Management:   blood glucose monitored   supplemental insulin given     Problem: Impaired Wound Healing  Goal: Optimal Wound Healing  Outcome: Ongoing, Progressing  Intervention: Promote Wound Healing  Flowsheets (Taken 5/12/2023 2100)  Oral Nutrition Promotion:   calorie-dense foods provided   safe use of adaptive equipment encouraged  Sleep/Rest Enhancement:   awakenings minimized   noise level reduced   regular sleep/rest pattern promoted  Activity Management: Rolling - L1  Pain Management Interventions:   position adjusted   quiet environment facilitated   pillow support provided

## 2023-05-14 LAB
POCT GLUCOSE: 182 MG/DL (ref 70–110)
POCT GLUCOSE: 186 MG/DL (ref 70–110)
POCT GLUCOSE: 200 MG/DL (ref 70–110)
POCT GLUCOSE: 205 MG/DL (ref 70–110)
POCT GLUCOSE: 224 MG/DL (ref 70–110)

## 2023-05-14 PROCEDURE — 63700000 PHARM REV CODE 250 ALT 637 W/O HCPCS: Performed by: STUDENT IN AN ORGANIZED HEALTH CARE EDUCATION/TRAINING PROGRAM

## 2023-05-14 PROCEDURE — 25000003 PHARM REV CODE 250: Performed by: STUDENT IN AN ORGANIZED HEALTH CARE EDUCATION/TRAINING PROGRAM

## 2023-05-14 PROCEDURE — 99900035 HC TECH TIME PER 15 MIN (STAT)

## 2023-05-14 PROCEDURE — 94760 N-INVAS EAR/PLS OXIMETRY 1: CPT

## 2023-05-14 PROCEDURE — 63600175 PHARM REV CODE 636 W HCPCS: Performed by: STUDENT IN AN ORGANIZED HEALTH CARE EDUCATION/TRAINING PROGRAM

## 2023-05-14 PROCEDURE — A4216 STERILE WATER/SALINE, 10 ML: HCPCS | Performed by: STUDENT IN AN ORGANIZED HEALTH CARE EDUCATION/TRAINING PROGRAM

## 2023-05-14 PROCEDURE — 11000004 HC SNF PRIVATE

## 2023-05-14 RX ORDER — HYDROCODONE BITARTRATE AND ACETAMINOPHEN 5; 325 MG/1; MG/1
1 TABLET ORAL 3 TIMES DAILY
Status: DISCONTINUED | OUTPATIENT
Start: 2023-05-14 | End: 2023-05-23

## 2023-05-14 RX ORDER — HYDROMORPHONE HYDROCHLORIDE 2 MG/ML
0.5 INJECTION, SOLUTION INTRAMUSCULAR; INTRAVENOUS; SUBCUTANEOUS ONCE
Status: COMPLETED | OUTPATIENT
Start: 2023-05-14 | End: 2023-05-14

## 2023-05-14 RX ORDER — NIFEDIPINE 30 MG/1
30 TABLET, EXTENDED RELEASE ORAL DAILY
Status: DISCONTINUED | OUTPATIENT
Start: 2023-05-14 | End: 2023-05-15

## 2023-05-14 RX ORDER — INSULIN ASPART 100 [IU]/ML
10 INJECTION, SOLUTION INTRAVENOUS; SUBCUTANEOUS
Status: DISCONTINUED | OUTPATIENT
Start: 2023-05-14 | End: 2023-05-25 | Stop reason: HOSPADM

## 2023-05-14 RX ORDER — FLUCONAZOLE 100 MG/1
100 TABLET ORAL DAILY
Status: DISCONTINUED | OUTPATIENT
Start: 2023-05-14 | End: 2023-05-17

## 2023-05-14 RX ORDER — LACTOBACILLUS ACIDOPHILUS 500MM CELL
1 CAPSULE ORAL
Status: DISCONTINUED | OUTPATIENT
Start: 2023-05-14 | End: 2023-05-25 | Stop reason: HOSPADM

## 2023-05-14 RX ADMIN — Medication 1 CAPSULE: at 08:05

## 2023-05-14 RX ADMIN — INSULIN DETEMIR 20 UNITS: 100 INJECTION, SOLUTION SUBCUTANEOUS at 10:05

## 2023-05-14 RX ADMIN — PIPERACILLIN AND TAZOBACTAM 4.5 G: 4; .5 INJECTION, POWDER, LYOPHILIZED, FOR SOLUTION INTRAVENOUS; PARENTERAL at 08:05

## 2023-05-14 RX ADMIN — PIPERACILLIN AND TAZOBACTAM 4.5 G: 4; .5 INJECTION, POWDER, LYOPHILIZED, FOR SOLUTION INTRAVENOUS; PARENTERAL at 10:05

## 2023-05-14 RX ADMIN — SODIUM CHLORIDE, PRESERVATIVE FREE 10 ML: 5 INJECTION INTRAVENOUS at 05:05

## 2023-05-14 RX ADMIN — ESCITALOPRAM 5 MG: 5 TABLET, FILM COATED ORAL at 08:05

## 2023-05-14 RX ADMIN — COLLAGENASE SANTYL: 250 OINTMENT TOPICAL at 09:05

## 2023-05-14 RX ADMIN — SODIUM CHLORIDE, PRESERVATIVE FREE 10 ML: 5 INJECTION INTRAVENOUS at 06:05

## 2023-05-14 RX ADMIN — SITAGLIPTIN 100 MG: 50 TABLET, FILM COATED ORAL at 08:05

## 2023-05-14 RX ADMIN — LOPERAMIDE HYDROCHLORIDE 2 MG: 2 CAPSULE ORAL at 08:05

## 2023-05-14 RX ADMIN — HYDROMORPHONE HYDROCHLORIDE 0.5 MG: 2 INJECTION, SOLUTION INTRAMUSCULAR; INTRAVENOUS; SUBCUTANEOUS at 09:05

## 2023-05-14 RX ADMIN — INSULIN ASPART 10 UNITS: 100 INJECTION, SOLUTION INTRAVENOUS; SUBCUTANEOUS at 12:05

## 2023-05-14 RX ADMIN — INSULIN ASPART 1 UNITS: 100 INJECTION, SOLUTION INTRAVENOUS; SUBCUTANEOUS at 10:05

## 2023-05-14 RX ADMIN — Medication 1 CAPSULE: at 05:05

## 2023-05-14 RX ADMIN — NIFEDIPINE 30 MG: 30 TABLET, FILM COATED, EXTENDED RELEASE ORAL at 08:05

## 2023-05-14 RX ADMIN — FOLIC ACID 2 MG: 1 TABLET ORAL at 08:05

## 2023-05-14 RX ADMIN — CARVEDILOL 12.5 MG: 12.5 TABLET, FILM COATED ORAL at 08:05

## 2023-05-14 RX ADMIN — FERROUS SULFATE TAB 325 MG (65 MG ELEMENTAL FE) 1 EACH: 325 (65 FE) TAB at 08:05

## 2023-05-14 RX ADMIN — HYDRALAZINE HYDROCHLORIDE 10 MG: 20 INJECTION, SOLUTION INTRAMUSCULAR; INTRAVENOUS at 03:05

## 2023-05-14 RX ADMIN — HYDRALAZINE HYDROCHLORIDE 10 MG: 20 INJECTION, SOLUTION INTRAMUSCULAR; INTRAVENOUS at 09:05

## 2023-05-14 RX ADMIN — HYDROCODONE BITARTRATE AND ACETAMINOPHEN 1 TABLET: 10; 325 TABLET ORAL at 08:05

## 2023-05-14 RX ADMIN — Medication 1 CAPSULE: at 12:05

## 2023-05-14 RX ADMIN — HYDROCODONE BITARTRATE AND ACETAMINOPHEN 1 TABLET: 5; 325 TABLET ORAL at 10:05

## 2023-05-14 RX ADMIN — RIVAROXABAN 20 MG: 20 TABLET, FILM COATED ORAL at 05:05

## 2023-05-14 RX ADMIN — INSULIN ASPART 10 UNITS: 100 INJECTION, SOLUTION INTRAVENOUS; SUBCUTANEOUS at 04:05

## 2023-05-14 RX ADMIN — FLUCONAZOLE 100 MG: 100 TABLET ORAL at 08:05

## 2023-05-14 RX ADMIN — SODIUM CHLORIDE, PRESERVATIVE FREE 10 ML: 5 INJECTION INTRAVENOUS at 12:05

## 2023-05-14 RX ADMIN — INSULIN ASPART 14 UNITS: 100 INJECTION, SOLUTION INTRAVENOUS; SUBCUTANEOUS at 05:05

## 2023-05-14 RX ADMIN — INSULIN ASPART 2 UNITS: 100 INJECTION, SOLUTION INTRAVENOUS; SUBCUTANEOUS at 05:05

## 2023-05-14 NOTE — NURSING
"Contacted Dr. Reyes due to patient's blood pressure and heart rate being elevated.  Patient received PRN IV hydralazine at 0347 this morning with Shawna night shift nurse.  Orders every 4 hours.  Clarification received from Dr. Reyes. Orders are to administered morning medications now; patient stated he maybe reacting to his "buttock" hurting.  PRN Norco to be administered all.  BP will be reassessed within 30 minutes to an hour after medications is administered.   Nursing staff will continue to monitor.    "
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
## 2023-05-14 NOTE — PLAN OF CARE
Problem: Adult Inpatient Plan of Care  Goal: Plan of Care Review  Outcome: Ongoing, Progressing  Flowsheets (Taken 5/13/2023 2030)  Plan of Care Reviewed With: patient  Goal: Patient-Specific Goal (Individualized)  Flowsheets (Taken 5/13/2023 2030)  Anxieties, Fears or Concerns: Healing of wounds  Individualized Care Needs: Antibiotic therapy and wound care     Problem: Diabetes Comorbidity  Goal: Blood Glucose Level Within Targeted Range  Outcome: Ongoing, Progressing  Intervention: Monitor and Manage Glycemia  Flowsheets (Taken 5/13/2023 2030)  Glycemic Management:   blood glucose monitored   supplemental insulin given     Problem: Glycemic Control Impaired (Sepsis/Septic Shock)  Goal: Blood Glucose Level Within Desired Range  Outcome: Ongoing, Progressing  Intervention: Optimize Glycemic Control  Flowsheets (Taken 5/13/2023 2030)  Glycemic Management:   blood glucose monitored   supplemental insulin given

## 2023-05-14 NOTE — PLAN OF CARE
Problem: Adult Inpatient Plan of Care  Goal: Plan of Care Review  Outcome: Ongoing, Progressing  Goal: Patient-Specific Goal (Individualized)  Outcome: Ongoing, Progressing  Flowsheets (Taken 5/13/2023 0800)  Anxieties, Fears or Concerns: Wound Healing  Individualized Care Needs: Staff will provide daily wound care and ABO therapy  Goal: Absence of Hospital-Acquired Illness or Injury  Outcome: Ongoing, Progressing     Problem: Diabetes Comorbidity  Goal: Blood Glucose Level Within Targeted Range  Outcome: Ongoing, Progressing     Problem: Infection Progression (Sepsis/Septic Shock)  Goal: Absence of Infection Signs and Symptoms  Outcome: Ongoing, Progressing  Intervention: Initiate Sepsis Management  Flowsheets (Taken 5/13/2023 0800)  Stabilization Measures:   legs elevated   airway opened  Intervention: Promote Stabilization  Flowsheets (Taken 5/13/2023 0800)  Fluid/Electrolyte Management: fluids provided

## 2023-05-14 NOTE — PLAN OF CARE
Problem: Adult Inpatient Plan of Care  Goal: Plan of Care Review  Outcome: Ongoing, Progressing  Goal: Patient-Specific Goal (Individualized)  Outcome: Ongoing, Progressing  Flowsheets (Taken 5/14/2023 0701)  Anxieties, Fears or Concerns: Wound healing  Individualized Care Needs: Pain management and control of blood pressure/heart rate     Problem: Diabetes Comorbidity  Goal: Blood Glucose Level Within Targeted Range  Outcome: Ongoing, Progressing  Intervention: Monitor and Manage Glycemia  Flowsheets (Taken 5/14/2023 0701)  Glycemic Management:   blood glucose monitored   oral hydration promoted     Problem: Glycemic Control Impaired (Sepsis/Septic Shock)  Goal: Blood Glucose Level Within Desired Range  Outcome: Ongoing, Progressing  Intervention: Optimize Glycemic Control  Flowsheets (Taken 5/14/2023 0701)  Glycemic Management:   blood glucose monitored   oral hydration promoted     Problem: Impaired Wound Healing  Goal: Optimal Wound Healing  Outcome: Ongoing, Progressing  Intervention: Promote Wound Healing  Flowsheets (Taken 5/14/2023 0701)  Pain Management Interventions:   care clustered   medication offered   pain management plan reviewed with patient/caregiver   pillow support provided   quiet environment facilitated   relaxation techniques promoted

## 2023-05-15 LAB
ANION GAP SERPL CALC-SCNC: 5 MEQ/L
ANISOCYTOSIS BLD QL SMEAR: ABNORMAL
BACTERIA SPEC CULT: ABNORMAL
BACTERIA SPEC CULT: ABNORMAL
BASOPHILS # BLD AUTO: 0.01 X10(3)/MCL
BASOPHILS NFR BLD AUTO: 0.2 %
BUN SERPL-MCNC: 36.7 MG/DL (ref 8.4–25.7)
CALCIUM SERPL-MCNC: 8.2 MG/DL (ref 8.4–10.2)
CHLORIDE SERPL-SCNC: 104 MMOL/L (ref 98–107)
CO2 SERPL-SCNC: 26 MMOL/L (ref 22–29)
CREAT SERPL-MCNC: 2.16 MG/DL (ref 0.73–1.18)
CREAT/UREA NIT SERPL: 17
EOSINOPHIL # BLD AUTO: 0.22 X10(3)/MCL (ref 0–0.9)
EOSINOPHIL NFR BLD AUTO: 3.4 %
ERYTHROCYTE [DISTWIDTH] IN BLOOD BY AUTOMATED COUNT: 25.5 % (ref 11.5–17)
GFR SERPLBLD CREATININE-BSD FMLA CKD-EPI: 35 MLS/MIN/1.73/M2
GLUCOSE SERPL-MCNC: 211 MG/DL (ref 74–100)
HCT VFR BLD AUTO: 26.7 % (ref 42–52)
HCT VFR BLD AUTO: 29.5 % (ref 42–52)
HGB BLD-MCNC: 7.7 G/DL (ref 14–18)
HGB BLD-MCNC: 8.4 G/DL (ref 14–18)
IMM GRANULOCYTES # BLD AUTO: 0.02 X10(3)/MCL (ref 0–0.04)
IMM GRANULOCYTES NFR BLD AUTO: 0.3 %
LYMPHOCYTES # BLD AUTO: 1.74 X10(3)/MCL (ref 0.6–4.6)
LYMPHOCYTES NFR BLD AUTO: 26.9 %
MAGNESIUM SERPL-MCNC: 2.1 MG/DL (ref 1.6–2.6)
MCH RBC QN AUTO: 21.3 PG (ref 27–31)
MCHC RBC AUTO-ENTMCNC: 28.5 G/DL (ref 33–36)
MCV RBC AUTO: 74.9 FL (ref 80–94)
MICROCYTES BLD QL SMEAR: ABNORMAL
MONOCYTES # BLD AUTO: 0.49 X10(3)/MCL (ref 0.1–1.3)
MONOCYTES NFR BLD AUTO: 7.6 %
NEUTROPHILS # BLD AUTO: 3.99 X10(3)/MCL (ref 2.1–9.2)
NEUTROPHILS NFR BLD AUTO: 61.6 %
PLATELET # BLD AUTO: 266 X10(3)/MCL (ref 130–400)
PLATELET # BLD EST: NORMAL 10*3/UL
PMV BLD AUTO: 9.2 FL (ref 7.4–10.4)
POCT GLUCOSE: 185 MG/DL (ref 70–110)
POCT GLUCOSE: 189 MG/DL (ref 70–110)
POCT GLUCOSE: 205 MG/DL (ref 70–110)
POTASSIUM SERPL-SCNC: 4.5 MMOL/L (ref 3.5–5.1)
RBC # BLD AUTO: 3.94 X10(6)/MCL (ref 4.7–6.1)
RBC MORPH BLD: ABNORMAL
SODIUM SERPL-SCNC: 135 MMOL/L (ref 136–145)
WBC # SPEC AUTO: 6.47 X10(3)/MCL (ref 4.5–11.5)

## 2023-05-15 PROCEDURE — 83735 ASSAY OF MAGNESIUM: CPT | Performed by: STUDENT IN AN ORGANIZED HEALTH CARE EDUCATION/TRAINING PROGRAM

## 2023-05-15 PROCEDURE — 85025 COMPLETE CBC W/AUTO DIFF WBC: CPT | Performed by: STUDENT IN AN ORGANIZED HEALTH CARE EDUCATION/TRAINING PROGRAM

## 2023-05-15 PROCEDURE — 94760 N-INVAS EAR/PLS OXIMETRY 1: CPT

## 2023-05-15 PROCEDURE — 63700000 PHARM REV CODE 250 ALT 637 W/O HCPCS: Performed by: STUDENT IN AN ORGANIZED HEALTH CARE EDUCATION/TRAINING PROGRAM

## 2023-05-15 PROCEDURE — 97162 PT EVAL MOD COMPLEX 30 MIN: CPT

## 2023-05-15 PROCEDURE — 25000003 PHARM REV CODE 250: Performed by: STUDENT IN AN ORGANIZED HEALTH CARE EDUCATION/TRAINING PROGRAM

## 2023-05-15 PROCEDURE — 85014 HEMATOCRIT: CPT | Performed by: STUDENT IN AN ORGANIZED HEALTH CARE EDUCATION/TRAINING PROGRAM

## 2023-05-15 PROCEDURE — 11000004 HC SNF PRIVATE

## 2023-05-15 PROCEDURE — 99900035 HC TECH TIME PER 15 MIN (STAT)

## 2023-05-15 PROCEDURE — 80048 BASIC METABOLIC PNL TOTAL CA: CPT | Performed by: STUDENT IN AN ORGANIZED HEALTH CARE EDUCATION/TRAINING PROGRAM

## 2023-05-15 PROCEDURE — 27000207 HC ISOLATION

## 2023-05-15 PROCEDURE — 63600175 PHARM REV CODE 636 W HCPCS: Mod: JZ,JG | Performed by: STUDENT IN AN ORGANIZED HEALTH CARE EDUCATION/TRAINING PROGRAM

## 2023-05-15 PROCEDURE — 97606 NEG PRS WND THER DME>50 SQCM: CPT

## 2023-05-15 PROCEDURE — A4216 STERILE WATER/SALINE, 10 ML: HCPCS | Performed by: STUDENT IN AN ORGANIZED HEALTH CARE EDUCATION/TRAINING PROGRAM

## 2023-05-15 RX ORDER — SODIUM CHLORIDE 9 MG/ML
INJECTION, SOLUTION INTRAVENOUS CONTINUOUS
Status: ACTIVE | OUTPATIENT
Start: 2023-05-15 | End: 2023-05-16

## 2023-05-15 RX ORDER — CARVEDILOL 12.5 MG/1
25 TABLET ORAL DAILY
Status: DISCONTINUED | OUTPATIENT
Start: 2023-05-15 | End: 2023-05-25 | Stop reason: HOSPADM

## 2023-05-15 RX ORDER — METOPROLOL TARTRATE 1 MG/ML
5 INJECTION, SOLUTION INTRAVENOUS ONCE
Status: COMPLETED | OUTPATIENT
Start: 2023-05-15 | End: 2023-05-15

## 2023-05-15 RX ORDER — NIFEDIPINE 30 MG/1
60 TABLET, EXTENDED RELEASE ORAL DAILY
Status: DISCONTINUED | OUTPATIENT
Start: 2023-05-15 | End: 2023-05-15

## 2023-05-15 RX ORDER — NIFEDIPINE 30 MG/1
30 TABLET, EXTENDED RELEASE ORAL DAILY
Status: DISCONTINUED | OUTPATIENT
Start: 2023-05-16 | End: 2023-05-17

## 2023-05-15 RX ADMIN — INSULIN ASPART 10 UNITS: 100 INJECTION, SOLUTION INTRAVENOUS; SUBCUTANEOUS at 09:05

## 2023-05-15 RX ADMIN — METOPROLOL TARTRATE 5 MG: 1 INJECTION, SOLUTION INTRAVENOUS at 04:05

## 2023-05-15 RX ADMIN — COLLAGENASE SANTYL: 250 OINTMENT TOPICAL at 09:05

## 2023-05-15 RX ADMIN — SODIUM CHLORIDE, PRESERVATIVE FREE 10 ML: 5 INJECTION INTRAVENOUS at 06:05

## 2023-05-15 RX ADMIN — SODIUM CHLORIDE, PRESERVATIVE FREE 10 ML: 5 INJECTION INTRAVENOUS at 05:05

## 2023-05-15 RX ADMIN — ESCITALOPRAM 5 MG: 5 TABLET, FILM COATED ORAL at 09:05

## 2023-05-15 RX ADMIN — SITAGLIPTIN 100 MG: 50 TABLET, FILM COATED ORAL at 09:05

## 2023-05-15 RX ADMIN — INSULIN ASPART 10 UNITS: 100 INJECTION, SOLUTION INTRAVENOUS; SUBCUTANEOUS at 11:05

## 2023-05-15 RX ADMIN — SODIUM CHLORIDE: 9 INJECTION, SOLUTION INTRAVENOUS at 04:05

## 2023-05-15 RX ADMIN — FERROUS SULFATE TAB 325 MG (65 MG ELEMENTAL FE) 1 EACH: 325 (65 FE) TAB at 09:05

## 2023-05-15 RX ADMIN — Medication 1 CAPSULE: at 05:05

## 2023-05-15 RX ADMIN — HYDROCODONE BITARTRATE AND ACETAMINOPHEN 1 TABLET: 5; 325 TABLET ORAL at 09:05

## 2023-05-15 RX ADMIN — FLUCONAZOLE 100 MG: 100 TABLET ORAL at 09:05

## 2023-05-15 RX ADMIN — SODIUM CHLORIDE, PRESERVATIVE FREE 10 ML: 5 INJECTION INTRAVENOUS at 04:05

## 2023-05-15 RX ADMIN — Medication 1 CAPSULE: at 09:05

## 2023-05-15 RX ADMIN — FOLIC ACID 2 MG: 1 TABLET ORAL at 09:05

## 2023-05-15 RX ADMIN — LOPERAMIDE HYDROCHLORIDE 2 MG: 2 CAPSULE ORAL at 09:05

## 2023-05-15 RX ADMIN — Medication 1 CAPSULE: at 11:05

## 2023-05-15 RX ADMIN — INSULIN ASPART 4 UNITS: 100 INJECTION, SOLUTION INTRAVENOUS; SUBCUTANEOUS at 07:05

## 2023-05-15 RX ADMIN — INSULIN ASPART 10 UNITS: 100 INJECTION, SOLUTION INTRAVENOUS; SUBCUTANEOUS at 04:05

## 2023-05-15 RX ADMIN — CEFTRIAXONE SODIUM 1 G: 1 INJECTION, POWDER, FOR SOLUTION INTRAMUSCULAR; INTRAVENOUS at 01:05

## 2023-05-15 RX ADMIN — ALTEPLASE 2 MG: 2.2 INJECTION, POWDER, LYOPHILIZED, FOR SOLUTION INTRAVENOUS at 11:05

## 2023-05-15 RX ADMIN — INSULIN DETEMIR 20 UNITS: 100 INJECTION, SOLUTION SUBCUTANEOUS at 08:05

## 2023-05-15 RX ADMIN — RIVAROXABAN 20 MG: 20 TABLET, FILM COATED ORAL at 05:05

## 2023-05-15 RX ADMIN — CARVEDILOL 25 MG: 12.5 TABLET, FILM COATED ORAL at 09:05

## 2023-05-15 RX ADMIN — Medication 10 ML: at 11:05

## 2023-05-15 RX ADMIN — SODIUM CHLORIDE, PRESERVATIVE FREE 10 ML: 5 INJECTION INTRAVENOUS at 01:05

## 2023-05-15 RX ADMIN — NIFEDIPINE 60 MG: 30 TABLET, FILM COATED, EXTENDED RELEASE ORAL at 09:05

## 2023-05-15 NOTE — PLAN OF CARE
Problem: Diabetes Comorbidity  Goal: Blood Glucose Level Within Targeted Range  Outcome: Ongoing, Progressing  Intervention: Monitor and Manage Glycemia  Flowsheets (Taken 5/15/2023 0223)  Glycemic Management: blood glucose monitored     Problem: Glycemic Control Impaired (Sepsis/Septic Shock)  Goal: Blood Glucose Level Within Desired Range  Outcome: Ongoing, Progressing  Intervention: Optimize Glycemic Control  Flowsheets (Taken 5/15/2023 0223)  Glycemic Management: blood glucose monitored

## 2023-05-15 NOTE — PROGRESS NOTES
Follow up assessment performed.   Surgical debridement in OR with Dr. Jean on 5/12/23.  Upon examination of both R ischium and L trochantar, no lexi bleeding noted.  Increase in granulation tissue present to both site.  No odor present to either site.   Although no bone exposure noted to either site, periosteum is visualized in both.   NPWT applied to both sites with Y connector sited.  Santyl applied to non viable tissue  with crushed Flagyl (home med) for odor control.   Black granufoam/drape applied.  Hydrocolloid and mastisol utilized for additional adhesive.   Able to maintain -125mmHg continuous pressure with intact seal.   All other wounds continue to improve with current dressing change regimen.   Specialty mattress as well as all preventative measures remain in use.  Per Dr. Jean recommendations, Pt may be out of bed in motorized chair twice a day x 1 hr at a time.   Discussed with patient and family.  Verbalized understanding.          05/15/23 1328   WOCN Assessment   WOCN Total Time (mins) 130   Visit Date 05/15/23   Consult Type Follow Up   WOCN Speciality Wound   WOCN List wound vac   Wound pressure   Continence Type Fecal   Intervention assessed;chart review;orders   Teaching on-going   Skin Interventions   Pressure Reduction Devices specialty bed utilized;pressure-redistributing mattress utilized;heel offloading device utilized;elbow protectors utilized   Pressure Reduction Techniques weight shift assistance provided;sit time limited to 1 hour;pressure points protected;positioned off wounds   Pressure Injury Prevention    Check Moisture Management Pad Done   Heel protection technique Heel boot   Check Medical Devices Done        Altered Skin Integrity 05/06/22 1140 Right Heel  Full thickness tissue loss. Subcutaneous fat may be visible but bone, tendon or muscle are not exposed   Date First Assessed/Time First Assessed: 05/06/22 1140   Altered Skin Integrity Present on Admission: yes   Side: Right  Location: Heel  Is this injury device related?: No  Primary Wound Type: (c)   Description of Altered Skin Integrity: Full thickness...   Wound Image    Dressing Appearance Intact;Moist drainage   Drainage Amount Moderate   Drainage Characteristics/Odor Serosanguineous;No odor;Bleeding controlled   Appearance Red;Granulating   Tissue loss description Full thickness   Red (%), Wound Tissue Color 100 %   Periwound Area Scar tissue;Dry   Wound Edges Defined   Wound Length (cm) 4.3 cm   Wound Width (cm) 2.5 cm   Wound Depth (cm) 0.1 cm   Wound Volume (cm^3) 1.075 cm^3   Wound Surface Area (cm^2) 10.75 cm^2   Care Cleansed with:;Antimicrobial agent   Dressing Applied;Hydrofiber;Silver;Gauze;Non-adherent;Rolled gauze   Periwound Care Skin barrier film applied   Dressing Change Due 05/18/23        Altered Skin Integrity 02/09/23 1324 Right anterior Ankle Ulceration Full thickness tissue loss. Subcutaneous fat may be visible but bone, tendon or muscle are not exposed   Date First Assessed/Time First Assessed: 02/09/23 1324   Altered Skin Integrity Present on Admission: yes  Side: Right  Orientation: anterior  Location: Ankle  Primary Wound Type: Ulceration  Description of Altered Skin Integrity: Full thickness tissu...   Wound Image    Dressing Appearance Intact;Dry;Clean   Drainage Amount None   Appearance Black;Dry;Fibrin  (scab)   Black (%), Wound Tissue Color 100 %   Periwound Area Intact   Wound Edges Defined   Wound Length (cm) 0.5 cm   Wound Width (cm) 0.7 cm   Wound Surface Area (cm^2) 0.35 cm^2   Care Cleansed with:;Antimicrobial agent   Dressing Applied;Gauze;Rolled gauze   Dressing Change Due 05/18/23        Altered Skin Integrity 05/08/23 1639 Left Ischial tuberosity Partial thickness tissue loss. Shallow open ulcer with a red or pink wound bed, without slough. Intact or Open/Ruptured Serum-filled blister.   Date First Assessed/Time First Assessed: 05/08/23 1639   Altered Skin Integrity Present on  Admission - Did Patient arrive to the hospital with altered skin?: yes  Side: Left  Location: Ischial tuberosity  Is this injury device related?: No  Descriptio...   Wound Image    Description of Altered Skin Integrity Partial thickness tissue loss. Shallow open ulcer with a red or pink wound bed, without slough. Intact or Open/Ruptured Serum-filled blister.   Dressing Appearance Intact;Moist drainage   Drainage Amount Scant   Drainage Characteristics/Odor Serosanguineous;No odor;Bleeding controlled   Appearance Red;Not granulating   Tissue loss description Partial thickness   Red (%), Wound Tissue Color 100 %   Periwound Area Intact;Moist   Wound Edges Irregular   Wound Length (cm) 1.6 cm   Wound Width (cm) 2.3 cm   Wound Depth (cm) 0.1 cm   Wound Volume (cm^3) 0.368 cm^3   Wound Surface Area (cm^2) 3.68 cm^2   Care Cleansed with:;Antimicrobial agent   Dressing Sodium chloride impregnated;Gauze;Other (comment)  (medfix tape)   Dressing Change Due 05/16/23        Altered Skin Integrity 01/12/23 1530 Sacral spine Full thickness tissue loss with exposed bone, tendon, or muscle. Often includes undermining and tunneling. May extend into muscle and/or supporting structures.   Date First Assessed/Time First Assessed: 01/12/23 1530   Altered Skin Integrity Present on Admission - Did Patient arrive to the hospital with altered skin?: yes  Location: Sacral spine  Description of Altered Skin Integrity: Full thickness tissue los...   Wound Image    Description of Altered Skin Integrity Full thickness tissue loss. Subcutaneous fat may be visible but bone, tendon or muscle are not exposed   Dressing Appearance Intact;Moist drainage   Drainage Amount Moderate   Drainage Characteristics/Odor Serous;No odor;Bleeding controlled   Appearance Red;Granulating;White;Fibrin   Tissue loss description Full thickness   Red (%), Wound Tissue Color 85 %   Periwound Area Excoriated;Scar tissue;Moist   Wound Edges Irregular   Wound Length (cm)  4.3 cm   Wound Width (cm) 0.8 cm   Wound Depth (cm) 0.5 cm   Wound Volume (cm^3) 1.72 cm^3   Wound Surface Area (cm^2) 3.44 cm^2   Care Cleansed with:;Antimicrobial agent   Dressing Applied;Sodium chloride impregnated;Non-adherent;Other (comment)  (santyl, medfix)   Periwound Care Skin barrier film applied   Dressing Change Due 05/16/23        Altered Skin Integrity 01/12/23 1532 Right Ischial tuberosity Full thickness tissue loss with exposed bone, tendon, or muscle. Often includes undermining and tunneling. May extend into muscle and/or supporting structures.   Date First Assessed/Time First Assessed: 01/12/23 1532   Altered Skin Integrity Present on Admission - Did Patient arrive to the hospital with altered skin?: yes  Side: Right  Location: Ischial tuberosity  Description of Altered Skin Integrity: Full t...   Wound Image    Description of Altered Skin Integrity Full thickness tissue loss with exposed bone, tendon, or muscle. Often includes undermining and tunneling. May extend into muscle and/or supporting structures.   Drainage Amount Moderate   Drainage Characteristics/Odor Serosanguineous;No odor;Bleeding controlled   Appearance Red;Granulating;Tan;White;Yellow;Muscle;Not granulating;Moist;Slough   Tissue loss description Full thickness   Red (%), Wound Tissue Color 65 %   Periwound Area Intact   Wound Edges Defined   Wound Length (cm) 12 cm   Wound Width (cm) 15 cm   Wound Depth (cm) 3 cm   Wound Volume (cm^3) 540 cm^3   Wound Surface Area (cm^2) 180 cm^2   Care Cleansed with:;Antimicrobial agent   Dressing Applied;Other (comment)  (santyl, crushed flagyl(home med) black granufoam / drape)   Periwound Care Hydrocolloid barrier applied;Skin barrier film applied;Other (see comments)  (mastisol)   Dressing Change Due 05/18/23        Altered Skin Integrity Left Greater trochanter Full thickness tissue loss. Base is covered by slough and/or eschar in the wound bed   No Date First Assessed or Time First Assessed  found.   Altered Skin Integrity Present on Admission - Did Patient arrive to the hospital with altered skin?: yes  Side: Left  Location: Greater trochanter  Description of Altered Skin Integrity: Full thic...   Wound Image    Description of Altered Skin Integrity Full thickness tissue loss with exposed bone, tendon, or muscle. Often includes undermining and tunneling. May extend into muscle and/or supporting structures.   Drainage Amount Moderate   Drainage Characteristics/Odor Creamy;Serosanguineous;No odor;Bleeding controlled   Appearance Red;Granulating;Tan;Gray;Slough;Muscle;Other (see comments)  (periosteum)   Tissue loss description Full thickness   Red (%), Wound Tissue Color 80 %   Periwound Area Intact   Wound Edges Defined   Wound Length (cm) 4 cm   Wound Width (cm) 5.5 cm   Wound Depth (cm) 1.8 cm   Wound Volume (cm^3) 39.6 cm^3   Wound Surface Area (cm^2) 22 cm^2   Undermining (depth (cm)/location) from 6-10 o'clock / 4cm at 9 o'clock   Care Cleansed with:;Antimicrobial agent;Wound cleanser;Other (see comments)  (NPWT with black granufoam)   Packing packed with;other (see comment)  (black granufoam)   Dressing Change Due 05/18/23        Negative Pressure Wound Therapy  03/20/23 1500 Right   Placement Date/Time: 03/20/23 1500   Side: Right  Location: Ischial tuberosity   NPWT Type Vacuum Therapy   Therapy Setting NPWT Continuous therapy   Pressure Setting NPWT 125 mmHg   Therapy Interventions NPWT Canister changed;Dressing changed;Y connector;Seal intact   Sponges Inserted NPWT Black;2        Negative Pressure Wound Therapy  05/15/23 1344 Left   Placement Date/Time: 05/15/23 1344   Side: Left  Location: Hip   NPWT Type Vacuum Therapy   Therapy Setting NPWT Continuous therapy   Pressure Setting NPWT 125 mmHg   Therapy Interventions NPWT Dressing changed;Seal intact;Y connector   Sponges Inserted NPWT Black;1

## 2023-05-15 NOTE — PLAN OF CARE
Problem: Physical Therapy  Goal: Physical Therapy Goal  Description: Description: Goals to be met by: Discharge      Patient will increase functional independence with mobility by performin. Pt to be able to tolerate ROM tasks to B UE and LE 2 x/day with 25% improvement/carryover demonstrated allowing for improved positioning to reduce risk of further skin break down.     Outcome: Ongoing, Progressing

## 2023-05-15 NOTE — PROGRESS NOTES
OT swing bed evaluation orders received however Pt is dependent with ADLs and has 24 hour sitters who performs them. DC OT orders at this time.

## 2023-05-15 NOTE — PT/OT/SLP EVAL
Physical Therapy IP Evaluation and Treatment    Patient Name: Antonio Galvan II   MRN: [id number]  Recent Surgery: * No surgery found *      Recommendations:     Discharge Recommendations: home, home with home health, home health OT, home health PT   Discharge Equipment Recommendations: none   Barriers to discharge: Ongoing medical treatment    Assessment:     Antonio Galvan II is a 57 y.o. male admitted with a medical diagnosis of Open wound of left hip. He presents with the following impairments/functional limitations: impaired sensation, impaired self care skills, impaired functional mobility, decreased upper extremity function, decreased lower extremity function, abnormal tone, decreased ROM, impaired joint extensibility, impaired muscle length. Pt is use to being up all day and has been stick in the bed here. Family and Pt are worried about regression in ROM and thus impacting his function upon DC.     Rehab Prognosis: Fair; patient would benefit from acute PT services to address these deficits and reach maximum level of function.    Plan:     During this hospitalization, patient to be seen 6 x/week to address the above listed problems via therapeutic activities, therapeutic exercises    Plan of Care Expires: 06/12/23    Subjective     Chief Complaint: Being stuck in bed, really want to be in his chair more than 1 hr at a time.   Patient Comments/Goals: Get ROM while here and be able to get up with family in WC if ok'd by wound care/MD  Pain/Comfort:  Pain Rating 1: 0/10  Pain Rating Post-Intervention 1: 0/10    Social History:  Living Environment: Patient lives with their family  in a single story home with  all needs accessible  Prior Level of Function: Prior to admission, patient was dependent in all care. Slide board with sitter to and from power chair  Equipment Used at Home:   Pt has all equipment needs at home.   DME owned (not currently used): none  Assistance Upon Discharge: family and  sitter(s)    Objective:     Communicated with Family and OT prior to session. Patient found HOB elevated with wound vac upon PT entry to room.    General Precautions: Standard,     Orthopedic Precautions:     Braces:      Respiratory Status: Room air    Exams:  Cognition: Patient is oriented to Person, Place, Time, Situation  RLE ROM: Deficits: lacking 50% of normal PROM  RLE Strength: Deficits: 0/5  LLE ROM: Deficits: lacking 75% of normal PROM  LLE Strength: Deficits: 0/5  RUE ROM: Deficits: Lacking 25% of normal PROM  LUE ROM: Deficits: lacking 24% of normal PROM    Functional Mobility:  Gait belt applied - No  Bed Mobility  Rolling Left: total assistance  Rolling Right: total assistance  Supine to Sit: total assistance for LE management and trunk management  Sit to Supine: total assistance for LE management and trunk management  Transfers   Pt does not want to get to chair due to limitations on time. PT to keep checking on OOB time progressions.     Therapeutic Activities and Exercises:   Patient educated on role of acute care PT and PT POC, safety while in hospital including calling nurse for mobility, and call light usage  Family educated that he could use tess for B LE elevation using slings. Sitter ok to get pt to chair due to I and safety demonstrated with tasks.   Per MD 1 hr OOB to chair at a time, 3 x/day max and no slide board use.     AM-PAC 6 CLICK MOBILITY  Total Score:6    Patient left in bed with all lines intact, call button in reach, and friend present.    GOALS:   Multidisciplinary Problems       Physical Therapy Goals          Problem: Physical Therapy    Goal Priority Disciplines Outcome Goal Variances Interventions   Physical Therapy Goal     PT, PT/OT Ongoing, Progressing     Description: Description: Goals to be met by: Discharge      Patient will increase functional independence with mobility by performin. Pt to be able to tolerate ROM tasks to B UE and LE 2 x/day with 25%  improvement/carryover demonstrated allowing for improved positioning to reduce risk of further skin break down.                          History:     Past Medical History:   Diagnosis Date    A-fib     Cardiac arrest     7/2022    Chronic skin ulcer     DM (diabetes mellitus)     Infected decubitus ulcer     Lymphedema of leg     Neurogenic bladder     Obesity, unspecified     Pressure ulcer of heel     Pressure ulcer of right foot, stage 3     Pressure ulcer of right ischium     Quadriplegia     Quadriplegic spinal paralysis        Past Surgical History:   Procedure Laterality Date    APPLICATION OF WOUND VACUUM-ASSISTED CLOSURE DEVICE Right 5/12/2023    Procedure: APPLICATION, WOUND VAC;  Surgeon: Keyonna Jean MD;  Location: [facility] OR;  Service: General;  Laterality: Right;    DEBRIDEMENT OF BUTTOCKS Right 5/12/2023    Procedure: DEBRIDEMENT, BUTTOCK;  Surgeon: Keyonna Jean MD;  Location: [facility] OR;  Service: General;  Laterality: Right;       Time Tracking:     PT Received On: 05/15/23  PT Start Time: 1333  PT Stop Time: 1426  PT Total Time (min): 53 min  [id number], [id number].     Billable Minutes: Evaluation Moderate    5/15/2023

## 2023-05-15 NOTE — PLAN OF CARE
[facility] - Medical Surgical Unit  Initial Discharge Assessment       Primary Care Provider: Jennifer Fernando NP    Admission Diagnosis: Sacral ulcer [L98.429]    Admission Date: 5/12/2023  Expected Discharge Date:     Transition of Care Barriers: None    Payor: MEDICARE / Plan: MEDICARE PART A & B / Product Type: Government /     Extended Emergency Contact Information  Primary Emergency Contact: Judy Galvan  Mobile Phone: [phone]  Relation: Mother  Preferred language: English   needed? No  Secondary Emergency Contact: jolly Russell  Mobile Phone: [phone]  Relation: Significant other  Preferred language: English   needed? No    Discharge Plan A: Home with family, Home Health         [address] [address]  Phone: [phone] Fax: [phone]    [id number] [address] AT [facility] & [address] [address]  Phone: [phone] Fax: [phone]      Initial Assessment (most recent)       Adult Discharge Assessment - 05/15/23 1142          Discharge Assessment    Assessment Type Discharge Planning Assessment     Confirmed/corrected address, phone number and insurance Yes     Confirmed Demographics Correct on Facesheet     Source of Information patient     If unable to respond/provide information was family/caregiver contacted? Yes     Contact Name/Number Judy echeverria [phone]     Communicated SADE with patient/caregiver Date not available/Unable to determine     Reason For Admission Buttock Wound     People in Home parent(s)     Facility Arrived From: home     Do you expect to return to your current living situation? Yes     Do you have help at home or someone to help you manage your care at home? Yes     Who are your caregiver(s) and their phone number(s)? Judy echeverria [phone]     Prior to  hospitilization cognitive status: Alert/Oriented     Current cognitive status: Alert/Oriented     Home Accessibility wheelchair accessible     Home Layout Able to live on 1st floor     Equipment Currently Used at Home bath bench;bedside commode;grab bar;lift device;power chair     Readmission within 30 days? No     Patient currently being followed by outpatient case management? No     Do you currently have service(s) that help you manage your care at home? No     Do you take prescription medications? Yes     Do you have prescription coverage? Yes     Coverage Judy echeverria [phone]     Do you have any problems affording any of your prescribed medications? TBD     Is the patient taking medications as prescribed? yes     Who is going to help you get home at discharge? Judy echeverria [phone]     How do you get to doctors appointments? family or friend will provide     Are you on dialysis? No     Do you take coumadin? No     Discharge Plan A Home with family;Home Health     DME Needed Upon Discharge  none     Discharge Plan discussed with: Parent(s)     Name(s) and Number(s) Judy echeverria [phone]     Transition of Care Barriers None        Physical Activity    On average, how many days per week do you engage in moderate to strenuous exercise (like a brisk walk)? 0 days     On average, how many minutes do you engage in exercise at this level? 0 min        Financial Resource Strain    How hard is it for you to pay for the very basics like food, housing, medical care, and heating? Not hard at all        Housing Stability    In the last 12 months, was there a time when you were not able to pay the mortgage or rent on time? No     In the last 12 months, how many places have you lived? 1     In the last 12 months, was there a time when you did not have a steady place to sleep or slept in a shelter (including now)? No        Transportation Needs    In the past 12 months, has lack of transportation kept you from  medical appointments or from getting medications? No     In the past 12 months, has lack of transportation kept you from meetings, work, or from getting things needed for daily living? No        Food Insecurity    Within the past 12 months, you worried that your food would run out before you got the money to buy more. Never true     Within the past 12 months, the food you bought just didn't last and you didn't have money to get more. Never true        Stress    Do you feel stress - tense, restless, nervous, or anxious, or unable to sleep at night because your mind is troubled all the time - these days? Only a little        Social Connections    In a typical week, how many times do you talk on the phone with family, friends, or neighbors? More than three times a week     How often do you get together with friends or relatives? More than three times a week     How often do you attend Judaism or Mandaeism services? More than 4 times per year     Do you belong to any clubs or organizations such as Judaism groups, unions, fraternal or athletic groups, or school groups? No     How often do you attend meetings of the clubs or organizations you belong to? 1 to 4 times per year     Are you , , , , never , or living with a partner? Never         Alcohol Use    Q1: How often do you have a drink containing alcohol? Never     Q2: How many drinks containing alcohol do you have on a typical day when you are drinking? Patient does not drink     Q3: How often do you have six or more drinks on one occasion? Never        OTHER    Name(s) of People in Home Judy echeverria [phone]

## 2023-05-16 LAB
ABO + RH BLD: NORMAL
ALBUMIN SERPL-MCNC: 2.3 G/DL (ref 3.5–5)
ALBUMIN/GLOB SERPL: 0.7 RATIO (ref 1.1–2)
ALP SERPL-CCNC: 109 UNIT/L (ref 40–150)
ALT SERPL-CCNC: 10 UNIT/L (ref 0–55)
AST SERPL-CCNC: 6 UNIT/L (ref 5–34)
BASOPHILS # BLD AUTO: 0.02 X10(3)/MCL
BASOPHILS NFR BLD AUTO: 0.2 %
BILIRUBIN DIRECT+TOT PNL SERPL-MCNC: 0.3 MG/DL
BLD PROD TYP BPU: NORMAL
BLOOD UNIT EXPIRATION DATE: NORMAL
BLOOD UNIT TYPE CODE: 7300
BUN SERPL-MCNC: 40 MG/DL (ref 8.4–25.7)
CALCIUM SERPL-MCNC: 8 MG/DL (ref 8.4–10.2)
CHLORIDE SERPL-SCNC: 107 MMOL/L (ref 98–107)
CO2 SERPL-SCNC: 23 MMOL/L (ref 22–29)
CREAT SERPL-MCNC: 2.11 MG/DL (ref 0.73–1.18)
CROSSMATCH INTERPRETATION: NORMAL
CRP SERPL-MCNC: 4.1 MG/L
DISPENSE STATUS: NORMAL
EOSINOPHIL # BLD AUTO: 0.23 X10(3)/MCL (ref 0–0.9)
EOSINOPHIL NFR BLD AUTO: 2.8 %
ERYTHROCYTE [DISTWIDTH] IN BLOOD BY AUTOMATED COUNT: 26 % (ref 11.5–17)
ERYTHROCYTE [SEDIMENTATION RATE] IN BLOOD: >140 MM/HR (ref 0–15)
GFR SERPLBLD CREATININE-BSD FMLA CKD-EPI: 36 MLS/MIN/1.73/M2
GLOBULIN SER-MCNC: 3.5 GM/DL (ref 2.4–3.5)
GLUCOSE SERPL-MCNC: 250 MG/DL (ref 74–100)
GROUP & RH: NORMAL
HCT VFR BLD AUTO: 26 % (ref 42–52)
HGB BLD-MCNC: 7.4 G/DL (ref 14–18)
IMM GRANULOCYTES # BLD AUTO: 0.02 X10(3)/MCL (ref 0–0.04)
IMM GRANULOCYTES NFR BLD AUTO: 0.2 %
INDIRECT COOMBS GEL: NORMAL
LYMPHOCYTES # BLD AUTO: 2.1 X10(3)/MCL (ref 0.6–4.6)
LYMPHOCYTES NFR BLD AUTO: 25.5 %
MAGNESIUM SERPL-MCNC: 2 MG/DL (ref 1.6–2.6)
MCH RBC QN AUTO: 21.9 PG (ref 27–31)
MCHC RBC AUTO-ENTMCNC: 28.5 G/DL (ref 33–36)
MCV RBC AUTO: 76.9 FL (ref 80–94)
MONOCYTES # BLD AUTO: 0.61 X10(3)/MCL (ref 0.1–1.3)
MONOCYTES NFR BLD AUTO: 7.4 %
NEUTROPHILS # BLD AUTO: 5.24 X10(3)/MCL (ref 2.1–9.2)
NEUTROPHILS NFR BLD AUTO: 63.9 %
PLATELET # BLD AUTO: 262 X10(3)/MCL (ref 130–400)
PMV BLD AUTO: 9.6 FL (ref 7.4–10.4)
POCT GLUCOSE: 143 MG/DL (ref 70–110)
POCT GLUCOSE: 158 MG/DL (ref 70–110)
POCT GLUCOSE: 165 MG/DL (ref 70–110)
POCT GLUCOSE: 165 MG/DL (ref 70–110)
POCT GLUCOSE: 182 MG/DL (ref 70–110)
POTASSIUM SERPL-SCNC: 4.5 MMOL/L (ref 3.5–5.1)
PROT SERPL-MCNC: 5.8 GM/DL (ref 6.4–8.3)
RBC # BLD AUTO: 3.38 X10(6)/MCL (ref 4.7–6.1)
SODIUM SERPL-SCNC: 141 MMOL/L (ref 136–145)
SPECIMEN OUTDATE: NORMAL
UNIT NUMBER: NORMAL
WBC # SPEC AUTO: 8.22 X10(3)/MCL (ref 4.5–11.5)

## 2023-05-16 PROCEDURE — 97110 THERAPEUTIC EXERCISES: CPT

## 2023-05-16 PROCEDURE — 99900035 HC TECH TIME PER 15 MIN (STAT)

## 2023-05-16 PROCEDURE — 86920 COMPATIBILITY TEST SPIN: CPT | Performed by: STUDENT IN AN ORGANIZED HEALTH CARE EDUCATION/TRAINING PROGRAM

## 2023-05-16 PROCEDURE — 36430 TRANSFUSION BLD/BLD COMPNT: CPT

## 2023-05-16 PROCEDURE — 27000207 HC ISOLATION

## 2023-05-16 PROCEDURE — 83735 ASSAY OF MAGNESIUM: CPT | Performed by: STUDENT IN AN ORGANIZED HEALTH CARE EDUCATION/TRAINING PROGRAM

## 2023-05-16 PROCEDURE — 86900 BLOOD TYPING SEROLOGIC ABO: CPT | Performed by: STUDENT IN AN ORGANIZED HEALTH CARE EDUCATION/TRAINING PROGRAM

## 2023-05-16 PROCEDURE — 63700000 PHARM REV CODE 250 ALT 637 W/O HCPCS: Performed by: STUDENT IN AN ORGANIZED HEALTH CARE EDUCATION/TRAINING PROGRAM

## 2023-05-16 PROCEDURE — 85025 COMPLETE CBC W/AUTO DIFF WBC: CPT | Performed by: STUDENT IN AN ORGANIZED HEALTH CARE EDUCATION/TRAINING PROGRAM

## 2023-05-16 PROCEDURE — 25000003 PHARM REV CODE 250: Performed by: STUDENT IN AN ORGANIZED HEALTH CARE EDUCATION/TRAINING PROGRAM

## 2023-05-16 PROCEDURE — 11000004 HC SNF PRIVATE

## 2023-05-16 PROCEDURE — 63600175 PHARM REV CODE 636 W HCPCS: Performed by: STUDENT IN AN ORGANIZED HEALTH CARE EDUCATION/TRAINING PROGRAM

## 2023-05-16 PROCEDURE — 80053 COMPREHEN METABOLIC PANEL: CPT | Performed by: STUDENT IN AN ORGANIZED HEALTH CARE EDUCATION/TRAINING PROGRAM

## 2023-05-16 PROCEDURE — P9016 RBC LEUKOCYTES REDUCED: HCPCS | Performed by: STUDENT IN AN ORGANIZED HEALTH CARE EDUCATION/TRAINING PROGRAM

## 2023-05-16 PROCEDURE — A4216 STERILE WATER/SALINE, 10 ML: HCPCS | Performed by: STUDENT IN AN ORGANIZED HEALTH CARE EDUCATION/TRAINING PROGRAM

## 2023-05-16 PROCEDURE — 94760 N-INVAS EAR/PLS OXIMETRY 1: CPT

## 2023-05-16 PROCEDURE — 86140 C-REACTIVE PROTEIN: CPT | Performed by: STUDENT IN AN ORGANIZED HEALTH CARE EDUCATION/TRAINING PROGRAM

## 2023-05-16 PROCEDURE — 85651 RBC SED RATE NONAUTOMATED: CPT | Performed by: STUDENT IN AN ORGANIZED HEALTH CARE EDUCATION/TRAINING PROGRAM

## 2023-05-16 RX ORDER — HYDROCODONE BITARTRATE AND ACETAMINOPHEN 500; 5 MG/1; MG/1
TABLET ORAL
Status: DISCONTINUED | OUTPATIENT
Start: 2023-05-16 | End: 2023-05-23

## 2023-05-16 RX ORDER — CLOBETASOL PROPIONATE 0.5 MG/G
CREAM TOPICAL 2 TIMES DAILY
Status: COMPLETED | OUTPATIENT
Start: 2023-05-16 | End: 2023-05-21

## 2023-05-16 RX ADMIN — Medication 1 CAPSULE: at 09:05

## 2023-05-16 RX ADMIN — CARVEDILOL 25 MG: 12.5 TABLET, FILM COATED ORAL at 09:05

## 2023-05-16 RX ADMIN — SODIUM CHLORIDE, PRESERVATIVE FREE 10 ML: 5 INJECTION INTRAVENOUS at 12:05

## 2023-05-16 RX ADMIN — SODIUM CHLORIDE, PRESERVATIVE FREE 10 ML: 5 INJECTION INTRAVENOUS at 05:05

## 2023-05-16 RX ADMIN — SITAGLIPTIN 100 MG: 50 TABLET, FILM COATED ORAL at 09:05

## 2023-05-16 RX ADMIN — HYDROCODONE BITARTRATE AND ACETAMINOPHEN 1 TABLET: 5; 325 TABLET ORAL at 09:05

## 2023-05-16 RX ADMIN — Medication 1 CAPSULE: at 05:05

## 2023-05-16 RX ADMIN — ACETAMINOPHEN 650 MG: 325 TABLET, FILM COATED ORAL at 12:05

## 2023-05-16 RX ADMIN — HYDROCODONE BITARTRATE AND ACETAMINOPHEN 1 TABLET: 5; 325 TABLET ORAL at 02:05

## 2023-05-16 RX ADMIN — DIPHENHYDRAMINE HYDROCHLORIDE 12.5 MG: 50 INJECTION, SOLUTION INTRAMUSCULAR; INTRAVENOUS at 09:05

## 2023-05-16 RX ADMIN — RIVAROXABAN 20 MG: 20 TABLET, FILM COATED ORAL at 05:05

## 2023-05-16 RX ADMIN — Medication 1 CAPSULE: at 12:05

## 2023-05-16 RX ADMIN — INSULIN DETEMIR 20 UNITS: 100 INJECTION, SOLUTION SUBCUTANEOUS at 09:05

## 2023-05-16 RX ADMIN — DIPHENHYDRAMINE HYDROCHLORIDE 15 ML: 12.5 LIQUID ORAL at 03:05

## 2023-05-16 RX ADMIN — NIFEDIPINE 30 MG: 30 TABLET, FILM COATED, EXTENDED RELEASE ORAL at 09:05

## 2023-05-16 RX ADMIN — INSULIN ASPART 10 UNITS: 100 INJECTION, SOLUTION INTRAVENOUS; SUBCUTANEOUS at 12:05

## 2023-05-16 RX ADMIN — FOLIC ACID 2 MG: 1 TABLET ORAL at 09:05

## 2023-05-16 RX ADMIN — CLOBETASOL PROPIONATE: 0.5 CREAM TOPICAL at 09:05

## 2023-05-16 RX ADMIN — ESCITALOPRAM 5 MG: 5 TABLET, FILM COATED ORAL at 09:05

## 2023-05-16 RX ADMIN — COLLAGENASE SANTYL: 250 OINTMENT TOPICAL at 09:05

## 2023-05-16 RX ADMIN — FERROUS SULFATE TAB 325 MG (65 MG ELEMENTAL FE) 1 EACH: 325 (65 FE) TAB at 09:05

## 2023-05-16 RX ADMIN — INSULIN ASPART 10 UNITS: 100 INJECTION, SOLUTION INTRAVENOUS; SUBCUTANEOUS at 06:05

## 2023-05-16 RX ADMIN — DIPHENHYDRAMINE HYDROCHLORIDE 15 ML: 12.5 LIQUID ORAL at 09:05

## 2023-05-16 RX ADMIN — CEFTRIAXONE SODIUM 1 G: 1 INJECTION, POWDER, FOR SOLUTION INTRAMUSCULAR; INTRAVENOUS at 02:05

## 2023-05-16 RX ADMIN — FLUCONAZOLE 100 MG: 100 TABLET ORAL at 09:05

## 2023-05-16 RX ADMIN — INSULIN ASPART 10 UNITS: 100 INJECTION, SOLUTION INTRAVENOUS; SUBCUTANEOUS at 09:05

## 2023-05-16 RX ADMIN — LOPERAMIDE HYDROCHLORIDE 2 MG: 2 CAPSULE ORAL at 09:05
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
## 2023-05-16 NOTE — PT/OT/SLP PROGRESS
Physical Therapy Treatment Note           Name: Antonio Galvan II    : 1965 (57 y.o.)  MRN: [id number]           TREATMENT SUMMARY AND RECOMMENDATIONS:    PT Received On: 23  PT Start Time: 1422     PT Stop Time: 1448  PT Total Time (min): 26 min     Subjective Assessment:   No complaints  Lethargic   x Awake, alert, cooperative  Uncooperative    Agitated  c/o pain    Appropriate  c/o fatigue    Confused x Treated at bedside     Emotionally labile  Treated in gym/dept.    Impulsive  Other:    Flat affect       Therapy Precautions:    Cognitive deficits  Spinal precautions    Collar - hard  Sternal precautions    Collar - soft   TLSO    Fall risk  LSO    Hip precautions - posterior  Knee immobilizer    Hip precautions - anterior  WBAT    Impaired communication  Partial weightbearing    Oxygen  TTWB    PEG tube  NWB    Visual deficits  Other:    Hearing deficits          Treatment Objectives:     Mobility Training:   Assist level Comments    Bed mobility     Transfer     Gait     Sit to stand transitions     Sitting balance     Standing balance      Wheelchair mobility     Car transfer     Other:          Therapeutic Exercise:   Exercise Sets Reps Comments   PROM to B LE and UE  20 All functional planes to end range                         Additional Comments:  Pt with reports his mattress seems to be over heating, nursing made aware. MD still only ok'd OOB 3 x/day for 1 hr at a time. Re-checking due to WC being a tilt in space.   PT to continue with ROM tasks. Will help with tess if ok'd for longer OOB tolerance.     Assessment: Patient tolerated session well.    PT Plan: continue per POC  Revisions made to plan of care: No    GOALS:   Multidisciplinary Problems       Physical Therapy Goals          Problem: Physical Therapy    Goal Priority Disciplines Outcome Goal Variances Interventions   Physical Therapy Goal     PT, PT/OT Ongoing, Progressing     Description: Description: Goals to be  met by: Discharge      Patient will increase functional independence with mobility by performin. Pt to be able to tolerate ROM tasks to B UE and LE 2 x/day with 25% improvement/carryover demonstrated allowing for improved positioning to reduce risk of further skin break down.                          Skilled PT Minutes Provided: 25   Communication with Treatment Team:     Equipment recommendations:       At end of treatment, patient remained:   Up in chair     Up in wheelchair in room   x In bed    With alarm activated    Bed rails up   x Call bell in reach    x Family/friends present    Restraints secured properly    In bathroom with CNA/RN notified    Nurse aware    In gym with therapist/tech    Other:
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
## 2023-05-16 NOTE — PROGRESS NOTES
Inpatient Nutrition Evaluation    Admit Date: 5/12/2023   Total duration of encounter: 4 days    Nutrition Recommendation/Prescription     Continue diabetic diet. 2. Add boost glucose control with meals.     Nutrition Assessment     Chart Review    Reason Seen: continuous nutrition monitoring, length of stay, and follow-up    Malnutrition Screening Tool Results   Have you recently lost weight without trying?: Yes: 34 lbs or more  Have you been eating poorly because of a decreased appetite?: No   MST Score: 4     Diagnosis:  Open wound of left hip    Relevant Medical History: A-fib  Date Unknown  Cardiac arrest   Date Unknown  Chronic skin ulcer  Date Unknown  DM (diabetes mellitus)  Date Unknown  Infected decubitus ulcer  Date Unknown  Lymphedema of leg  Date Unknown  Neurogenic bladder  Date Unknown  Obesity, unspecified  Date Unknown  Pressure ulcer of heel  Date Unknown  Pressure ulcer of right foot, stage 3  Date Unknown  Pressure ulcer of right ischium  Date Unknown  Quadriplegia  Date Unknown  Quadriplegic spinal paralysis    Nutrition-Related Medications: ceftriaxone, ferrous sulfate, folic acid, insulin aspart, insulin detemir, lactobacillus acidophilus,     Nutrition-Related Labs:   Latest Reference Range & Units 05/16/23 03:24   Sodium 136 - 145 mmol/L 141   Potassium 3.5 - 5.1 mmol/L 4.5   Chloride 98 - 107 mmol/L 107   CO2 22 - 29 mmol/L 23   BUN 8.4 - 25.7 mg/dL 40.0 (H)   Creatinine 0.73 - 1.18 mg/dL 2.11 (H)   eGFR mls/min/1.73/m2 36   Glucose 74 - 100 mg/dL 250 (H)   Calcium 8.4 - 10.2 mg/dL 8.0 (L)   Magnesium 1.60 - 2.60 mg/dL 2.00   Alkaline Phosphatase 40 - 150 unit/L 109   PROTEIN TOTAL 6.4 - 8.3 gm/dL 5.8 (L)   Albumin 3.5 - 5.0 g/dL 2.3 (L)   Albumin/Globulin Ratio 1.1 - 2.0 ratio 0.7 (L)   BILIRUBIN TOTAL <=1.5 mg/dL 0.3   AST 5 - 34 unit/L 6   ALT 0 - 55 unit/L 10   CRP <5.00 mg/L 4.10   Globulin, Total 2.4 - 3.5 gm/dL 3.5   (H): Data is abnormally high  (L): Data is abnormally low    Diet  "Order: Diet diabetic  Oral Supplement Order: none  Appetite/Oral Intake: poor/0-25% of meals  Factors Affecting Nutritional Intake: decreased appetite and tongue hurting  Food/Jehovah's witness/Cultural Preferences: none reported  Food Allergies: none reported    Skin Integrity: wound  Wound(s):      Altered Skin Integrity 05/06/22 1140 Right Heel  Full thickness tissue loss. Subcutaneous fat may be visible but bone, tendon or muscle are not exposed-Tissue loss description: Full thickness       Altered Skin Integrity 05/08/23 1639 Left Ischial tuberosity Partial thickness tissue loss. Shallow open ulcer with a red or pink wound bed, without slough. Intact or Open/Ruptured Serum-filled blister.-Tissue loss description: Partial thickness       Altered Skin Integrity 01/12/23 1530 Sacral spine Full thickness tissue loss with exposed bone, tendon, or muscle. Often includes undermining and tunneling. May extend into muscle and/or supporting structures.-Tissue loss description: Full thickness       Altered Skin Integrity 01/12/23 1532 Right Ischial tuberosity Full thickness tissue loss with exposed bone, tendon, or muscle. Often includes undermining and tunneling. May extend into muscle and/or supporting structures.-Tissue loss description: Full thickness       Altered Skin Integrity Left Greater trochanter Full thickness tissue loss. Base is covered by slough and/or eschar in the wound bed-Tissue loss description: Full thickness     Comments    Pt intake decrease. Discussed food preferences. Pt tongue is bothering him. Marked menus. Pt drank protein shake from family. Pt requesting soft foods.     Anthropometrics    Height: 5' 8" (172.7 cm)    Last Weight: 100.7 kg (222 lb) (05/12/23 1500) Weight Method: Bed Scale  BMI (Calculated): 33.8  BMI Classification: obese grade I (BMI 30-34.9)        Ideal Body Weight (IBW), Male: 154 lb     % Ideal Body Weight, Male (lb): 144.16 %                          Usual Weight Provided By: " unable to obtain usual weight    Wt Readings from Last 5 Encounters:   05/12/23 100.7 kg (222 lb)   05/08/23 100.7 kg (222 lb)   02/10/23 117.9 kg (260 lb)   12/28/22 117.9 kg (260 lb)   12/01/22 117.9 kg (260 lb)     Weight Change(s) Since Admission:  Admit Weight: 100.7 kg (222 lb) (05/12/23 1500)    Wt stable.   Patient Education    Not applicable.    Monitoring & Evaluation     Dietitian will monitor food and beverage intake, weight, weight change, electrolyte/renal panel, glucose/endocrine profile, and gastrointestinal profile.  Nutrition Risk/Follow-Up: low (follow-up in 5-7 days)  Patients assigned 'low nutrition risk' status do not qualify for a full nutritional assessment but will be monitored and re-evaluated in a 5-7 day time period. Please consult if re-evaluation needed sooner.

## 2023-05-16 NOTE — PLAN OF CARE
Problem: Adult Inpatient Plan of Care  Goal: Plan of Care Review  Outcome: Ongoing, Progressing  Flowsheets (Taken 5/16/2023 0104)  Plan of Care Reviewed With: patient  Goal: Patient-Specific Goal (Individualized)  Outcome: Ongoing, Progressing  Flowsheets (Taken 5/16/2023 0104)  Anxieties, Fears or Concerns: his wounds healing  Individualized Care Needs: pain management until end of shift 7a  Goal: Absence of Hospital-Acquired Illness or Injury  Outcome: Ongoing, Progressing  Intervention: Identify and Manage Fall Risk  Flowsheets (Taken 5/16/2023 0104)  Safety Promotion/Fall Prevention:   assistive device/personal item within reach   bed alarm set   Fall Risk reviewed with patient/family   side rails raised x 3  Intervention: Prevent Skin Injury  Flowsheets (Taken 5/16/2023 0104)  Body Position: position maintained  Skin Protection:   adhesive use limited   incontinence pads utilized  Intervention: Prevent and Manage VTE (Venous Thromboembolism) Risk  Flowsheets (Taken 5/16/2023 0104)  Activity Management: Rolling - L1  VTE Prevention/Management: bleeding precations maintained  Range of Motion: active ROM (range of motion) encouraged  Intervention: Prevent Infection  Flowsheets (Taken 5/16/2023 0104)  Infection Prevention:   environmental surveillance performed   personal protective equipment utilized   rest/sleep promoted   single patient room provided   equipment surfaces disinfected   hand hygiene promoted

## 2023-05-17 LAB
ALBUMIN SERPL-MCNC: 2.4 G/DL (ref 3.5–5)
ALBUMIN/GLOB SERPL: 0.6 RATIO (ref 1.1–2)
ALP SERPL-CCNC: 113 UNIT/L (ref 40–150)
ALT SERPL-CCNC: 12 UNIT/L (ref 0–55)
AST SERPL-CCNC: 6 UNIT/L (ref 5–34)
BASOPHILS # BLD AUTO: 0.01 X10(3)/MCL
BASOPHILS NFR BLD AUTO: 0.2 %
BILIRUBIN DIRECT+TOT PNL SERPL-MCNC: 0.3 MG/DL
BUN SERPL-MCNC: 41.3 MG/DL (ref 8.4–25.7)
CALCIUM SERPL-MCNC: 8.3 MG/DL (ref 8.4–10.2)
CHLORIDE SERPL-SCNC: 107 MMOL/L (ref 98–107)
CO2 SERPL-SCNC: 23 MMOL/L (ref 22–29)
CREAT SERPL-MCNC: 1.8 MG/DL (ref 0.73–1.18)
EOSINOPHIL # BLD AUTO: 0.25 X10(3)/MCL (ref 0–0.9)
EOSINOPHIL NFR BLD AUTO: 3.9 %
ERYTHROCYTE [DISTWIDTH] IN BLOOD BY AUTOMATED COUNT: 25.6 % (ref 11.5–17)
GFR SERPLBLD CREATININE-BSD FMLA CKD-EPI: 44 MLS/MIN/1.73/M2
GLOBULIN SER-MCNC: 3.7 GM/DL (ref 2.4–3.5)
GLUCOSE SERPL-MCNC: 229 MG/DL (ref 74–100)
HCT VFR BLD AUTO: 31.1 % (ref 42–52)
HGB BLD-MCNC: 9 G/DL (ref 14–18)
IMM GRANULOCYTES # BLD AUTO: 0.02 X10(3)/MCL (ref 0–0.04)
IMM GRANULOCYTES NFR BLD AUTO: 0.3 %
LYMPHOCYTES # BLD AUTO: 1.7 X10(3)/MCL (ref 0.6–4.6)
LYMPHOCYTES NFR BLD AUTO: 26.3 %
MCH RBC QN AUTO: 22.3 PG (ref 27–31)
MCHC RBC AUTO-ENTMCNC: 28.9 G/DL (ref 33–36)
MCV RBC AUTO: 77.2 FL (ref 80–94)
MONOCYTES # BLD AUTO: 0.48 X10(3)/MCL (ref 0.1–1.3)
MONOCYTES NFR BLD AUTO: 7.4 %
NEUTROPHILS # BLD AUTO: 4 X10(3)/MCL (ref 2.1–9.2)
NEUTROPHILS NFR BLD AUTO: 61.9 %
PLATELET # BLD AUTO: 244 X10(3)/MCL (ref 130–400)
PMV BLD AUTO: 9.9 FL (ref 7.4–10.4)
POCT GLUCOSE: 234 MG/DL (ref 70–110)
POCT GLUCOSE: 354 MG/DL (ref 70–110)
POCT GLUCOSE: 76 MG/DL (ref 70–110)
POTASSIUM SERPL-SCNC: 4.5 MMOL/L (ref 3.5–5.1)
PROT SERPL-MCNC: 6.1 GM/DL (ref 6.4–8.3)
RBC # BLD AUTO: 4.03 X10(6)/MCL (ref 4.7–6.1)
SODIUM SERPL-SCNC: 141 MMOL/L (ref 136–145)
WBC # SPEC AUTO: 6.46 X10(3)/MCL (ref 4.5–11.5)

## 2023-05-17 PROCEDURE — 99900035 HC TECH TIME PER 15 MIN (STAT)

## 2023-05-17 PROCEDURE — 63600175 PHARM REV CODE 636 W HCPCS: Performed by: STUDENT IN AN ORGANIZED HEALTH CARE EDUCATION/TRAINING PROGRAM

## 2023-05-17 PROCEDURE — 11000004 HC SNF PRIVATE

## 2023-05-17 PROCEDURE — 94761 N-INVAS EAR/PLS OXIMETRY MLT: CPT

## 2023-05-17 PROCEDURE — 25000003 PHARM REV CODE 250: Performed by: STUDENT IN AN ORGANIZED HEALTH CARE EDUCATION/TRAINING PROGRAM

## 2023-05-17 PROCEDURE — 94760 N-INVAS EAR/PLS OXIMETRY 1: CPT

## 2023-05-17 PROCEDURE — 80053 COMPREHEN METABOLIC PANEL: CPT | Performed by: STUDENT IN AN ORGANIZED HEALTH CARE EDUCATION/TRAINING PROGRAM

## 2023-05-17 PROCEDURE — 97535 SELF CARE MNGMENT TRAINING: CPT

## 2023-05-17 PROCEDURE — A4216 STERILE WATER/SALINE, 10 ML: HCPCS | Performed by: STUDENT IN AN ORGANIZED HEALTH CARE EDUCATION/TRAINING PROGRAM

## 2023-05-17 PROCEDURE — 85025 COMPLETE CBC W/AUTO DIFF WBC: CPT | Performed by: STUDENT IN AN ORGANIZED HEALTH CARE EDUCATION/TRAINING PROGRAM

## 2023-05-17 PROCEDURE — 63700000 PHARM REV CODE 250 ALT 637 W/O HCPCS: Performed by: STUDENT IN AN ORGANIZED HEALTH CARE EDUCATION/TRAINING PROGRAM

## 2023-05-17 PROCEDURE — 27000207 HC ISOLATION

## 2023-05-17 RX ORDER — METHYLPREDNISOLONE SOD SUCC 125 MG
125 VIAL (EA) INJECTION ONCE
Status: COMPLETED | OUTPATIENT
Start: 2023-05-17 | End: 2023-05-17

## 2023-05-17 RX ORDER — DIPHENHYDRAMINE HYDROCHLORIDE 50 MG/ML
50 INJECTION INTRAMUSCULAR; INTRAVENOUS ONCE
Status: COMPLETED | OUTPATIENT
Start: 2023-05-17 | End: 2023-05-17

## 2023-05-17 RX ORDER — METOPROLOL TARTRATE 1 MG/ML
5 INJECTION, SOLUTION INTRAVENOUS ONCE
Status: COMPLETED | OUTPATIENT
Start: 2023-05-17 | End: 2023-05-17

## 2023-05-17 RX ORDER — METOPROLOL TARTRATE 1 MG/ML
5 INJECTION, SOLUTION INTRAVENOUS EVERY 4 HOURS PRN
Status: DISCONTINUED | OUTPATIENT
Start: 2023-05-17 | End: 2023-05-25 | Stop reason: HOSPADM

## 2023-05-17 RX ORDER — NIFEDIPINE 30 MG/1
60 TABLET, EXTENDED RELEASE ORAL DAILY
Status: DISCONTINUED | OUTPATIENT
Start: 2023-05-17 | End: 2023-05-25 | Stop reason: HOSPADM

## 2023-05-17 RX ORDER — FAMOTIDINE 10 MG/ML
20 INJECTION INTRAVENOUS ONCE
Status: COMPLETED | OUTPATIENT
Start: 2023-05-17 | End: 2023-05-17

## 2023-05-17 RX ADMIN — INSULIN DETEMIR 20 UNITS: 100 INJECTION, SOLUTION SUBCUTANEOUS at 08:05

## 2023-05-17 RX ADMIN — CLOBETASOL PROPIONATE: 0.5 CREAM TOPICAL at 09:05

## 2023-05-17 RX ADMIN — SODIUM CHLORIDE, PRESERVATIVE FREE 10 ML: 5 INJECTION INTRAVENOUS at 12:05

## 2023-05-17 RX ADMIN — Medication 1 ENEMA: at 09:05

## 2023-05-17 RX ADMIN — METOPROLOL TARTRATE 5 MG: 1 INJECTION, SOLUTION INTRAVENOUS at 02:05

## 2023-05-17 RX ADMIN — COLLAGENASE SANTYL: 250 OINTMENT TOPICAL at 09:05

## 2023-05-17 RX ADMIN — FOLIC ACID 2 MG: 1 TABLET ORAL at 09:05

## 2023-05-17 RX ADMIN — ESCITALOPRAM 5 MG: 5 TABLET, FILM COATED ORAL at 09:05

## 2023-05-17 RX ADMIN — LOPERAMIDE HYDROCHLORIDE 2 MG: 2 CAPSULE ORAL at 09:05

## 2023-05-17 RX ADMIN — CEFTRIAXONE SODIUM 1 G: 1 INJECTION, POWDER, FOR SOLUTION INTRAMUSCULAR; INTRAVENOUS at 12:05

## 2023-05-17 RX ADMIN — DIPHENHYDRAMINE HYDROCHLORIDE 50 MG: 50 INJECTION, SOLUTION INTRAMUSCULAR; INTRAVENOUS at 12:05

## 2023-05-17 RX ADMIN — DIPHENHYDRAMINE HYDROCHLORIDE 15 ML: 12.5 LIQUID ORAL at 01:05

## 2023-05-17 RX ADMIN — METOPROLOL TARTRATE 5 MG: 1 INJECTION, SOLUTION INTRAVENOUS at 07:05

## 2023-05-17 RX ADMIN — Medication 1 CAPSULE: at 12:05

## 2023-05-17 RX ADMIN — NIFEDIPINE 60 MG: 30 TABLET, FILM COATED, EXTENDED RELEASE ORAL at 09:05

## 2023-05-17 RX ADMIN — SITAGLIPTIN 100 MG: 50 TABLET, FILM COATED ORAL at 09:05

## 2023-05-17 RX ADMIN — SODIUM CHLORIDE, PRESERVATIVE FREE 10 ML: 5 INJECTION INTRAVENOUS at 05:05

## 2023-05-17 RX ADMIN — INSULIN ASPART 10 UNITS: 100 INJECTION, SOLUTION INTRAVENOUS; SUBCUTANEOUS at 07:05

## 2023-05-17 RX ADMIN — CLOBETASOL PROPIONATE: 0.5 CREAM TOPICAL at 08:05

## 2023-05-17 RX ADMIN — Medication 1 CAPSULE: at 05:05

## 2023-05-17 RX ADMIN — FLUCONAZOLE 100 MG: 100 TABLET ORAL at 09:05

## 2023-05-17 RX ADMIN — METHYLPREDNISOLONE SODIUM SUCCINATE 125 MG: 125 INJECTION, POWDER, FOR SOLUTION INTRAMUSCULAR; INTRAVENOUS at 12:05

## 2023-05-17 RX ADMIN — INSULIN ASPART 10 UNITS: 100 INJECTION, SOLUTION INTRAVENOUS; SUBCUTANEOUS at 05:05

## 2023-05-17 RX ADMIN — RIVAROXABAN 20 MG: 20 TABLET, FILM COATED ORAL at 05:05

## 2023-05-17 RX ADMIN — HYDROCODONE BITARTRATE AND ACETAMINOPHEN 1 TABLET: 5; 325 TABLET ORAL at 09:05

## 2023-05-17 RX ADMIN — INSULIN ASPART 4 UNITS: 100 INJECTION, SOLUTION INTRAVENOUS; SUBCUTANEOUS at 05:05

## 2023-05-17 RX ADMIN — FERROUS SULFATE TAB 325 MG (65 MG ELEMENTAL FE) 1 EACH: 325 (65 FE) TAB at 09:05

## 2023-05-17 RX ADMIN — FAMOTIDINE 20 MG: 10 INJECTION, SOLUTION INTRAVENOUS at 12:05

## 2023-05-17 RX ADMIN — DIPHENHYDRAMINE HYDROCHLORIDE 15 ML: 12.5 LIQUID ORAL at 10:05

## 2023-05-17 RX ADMIN — SODIUM CHLORIDE, PRESERVATIVE FREE 10 ML: 5 INJECTION INTRAVENOUS at 11:05

## 2023-05-17 RX ADMIN — CARVEDILOL 25 MG: 12.5 TABLET, FILM COATED ORAL at 09:05

## 2023-05-17 RX ADMIN — DIPHENHYDRAMINE HYDROCHLORIDE 15 ML: 12.5 LIQUID ORAL at 05:05

## 2023-05-17 RX ADMIN — METOPROLOL TARTRATE 5 MG: 1 INJECTION, SOLUTION INTRAVENOUS at 04:05

## 2023-05-17 RX ADMIN — Medication 1 CAPSULE: at 09:05

## 2023-05-17 RX ADMIN — INSULIN ASPART 8 UNITS: 100 INJECTION, SOLUTION INTRAVENOUS; SUBCUTANEOUS at 06:05

## 2023-05-17 RX ADMIN — HYDROCODONE BITARTRATE AND ACETAMINOPHEN 1 TABLET: 5; 325 TABLET ORAL at 08:05

## 2023-05-17 RX ADMIN — LIDOCAINE: 40 CREAM TOPICAL at 09:05

## 2023-05-17 RX ADMIN — HYDROCODONE BITARTRATE AND ACETAMINOPHEN 1 TABLET: 5; 325 TABLET ORAL at 02:05

## 2023-05-17 NOTE — NURSING
2120: notified dr reyes of new onset of redness and hives to face and and behind ears. Concern was brought to me by pt and his family  member. Provided pt with magic mouthwash for the blisters to mouth, and  benedryl iv push that was in pt PRN orders for hives and redness to face . Doctor Reyes stated that the benedryl was what she would suggest and to monitor pt.   0035: Charge Nurse Shivam Maza, RN notified Dr.Reyes in regards to heart rate being in the 130's with a blood pressure of 99/64. She put in an order for 5 mg lopressor prn as needed for a heart rate sustained over 125.   0430: Dr reyes notified of heart rate sustaining in the 130's new order noted see mar.   0440: pt heart rate currently 75 bpm

## 2023-05-17 NOTE — PLAN OF CARE
Wife present for staffing. Nutritionally- appetite good. PT- ROM exercises only. Discussed astimotitis suggested ice cream and ice for this. Here for 4-6 weeks of abx for wound infection. All questions answered at this time

## 2023-05-17 NOTE — PT/OT/SLP PROGRESS
PT entered room at 1340 to assist pt to his WC, but personal sitter had already completed t/f. PT talked with nursing and they ok'd pt to go outside with family. Upgraded to 2hr duration in chair.
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
## 2023-05-17 NOTE — PATIENT CARE CONFERENCE
Name: Antonio Galvan II    : 1965 (57 y.o.)  MRN: [id number]            Interdisciplinary Team Conference     Case conference held with patient/family and care team to discuss progress, plan of care, barriers to be addressed for safe return home, equipment recommendations, and discharge planning. Communicated therapy progress with MD, RN, therapy clinicians and case management. All questions/concerns answered.

## 2023-05-18 LAB
ALBUMIN SERPL-MCNC: 2.7 G/DL (ref 3.5–5)
ALBUMIN/GLOB SERPL: 0.7 RATIO (ref 1.1–2)
ALP SERPL-CCNC: 115 UNIT/L (ref 40–150)
ALT SERPL-CCNC: 12 UNIT/L (ref 0–55)
AST SERPL-CCNC: 6 UNIT/L (ref 5–34)
BASOPHILS # BLD AUTO: 0 X10(3)/MCL
BASOPHILS NFR BLD AUTO: 0 %
BILIRUBIN DIRECT+TOT PNL SERPL-MCNC: 0.3 MG/DL
BUN SERPL-MCNC: 51.1 MG/DL (ref 8.4–25.7)
CALCIUM SERPL-MCNC: 8.7 MG/DL (ref 8.4–10.2)
CHLORIDE SERPL-SCNC: 105 MMOL/L (ref 98–107)
CO2 SERPL-SCNC: 22 MMOL/L (ref 22–29)
CREAT SERPL-MCNC: 1.96 MG/DL (ref 0.73–1.18)
EOSINOPHIL # BLD AUTO: 0 X10(3)/MCL (ref 0–0.9)
EOSINOPHIL NFR BLD AUTO: 0 %
ERYTHROCYTE [DISTWIDTH] IN BLOOD BY AUTOMATED COUNT: 26.2 % (ref 11.5–17)
GFR SERPLBLD CREATININE-BSD FMLA CKD-EPI: 40 MLS/MIN/1.73/M2
GLOBULIN SER-MCNC: 4 GM/DL (ref 2.4–3.5)
GLUCOSE SERPL-MCNC: 348 MG/DL (ref 74–100)
HCT VFR BLD AUTO: 34 % (ref 42–52)
HGB BLD-MCNC: 10 G/DL (ref 14–18)
IMM GRANULOCYTES # BLD AUTO: 0.02 X10(3)/MCL (ref 0–0.04)
IMM GRANULOCYTES NFR BLD AUTO: 0.5 %
LYMPHOCYTES # BLD AUTO: 0.58 X10(3)/MCL (ref 0.6–4.6)
LYMPHOCYTES NFR BLD AUTO: 13.8 %
MCH RBC QN AUTO: 22.5 PG (ref 27–31)
MCHC RBC AUTO-ENTMCNC: 29.4 G/DL (ref 33–36)
MCV RBC AUTO: 76.6 FL (ref 80–94)
MONOCYTES # BLD AUTO: 0.03 X10(3)/MCL (ref 0.1–1.3)
MONOCYTES NFR BLD AUTO: 0.7 %
NEUTROPHILS # BLD AUTO: 3.57 X10(3)/MCL (ref 2.1–9.2)
NEUTROPHILS NFR BLD AUTO: 85 %
PLATELET # BLD AUTO: 239 X10(3)/MCL (ref 130–400)
PMV BLD AUTO: 9.7 FL (ref 7.4–10.4)
POCT GLUCOSE: 169 MG/DL (ref 70–110)
POCT GLUCOSE: 221 MG/DL (ref 70–110)
POCT GLUCOSE: 298 MG/DL (ref 70–110)
POCT GLUCOSE: 316 MG/DL (ref 70–110)
POCT GLUCOSE: 331 MG/DL (ref 70–110)
POTASSIUM SERPL-SCNC: 4.9 MMOL/L (ref 3.5–5.1)
PROT SERPL-MCNC: 6.7 GM/DL (ref 6.4–8.3)
RBC # BLD AUTO: 4.44 X10(6)/MCL (ref 4.7–6.1)
SODIUM SERPL-SCNC: 139 MMOL/L (ref 136–145)
WBC # SPEC AUTO: 4.2 X10(3)/MCL (ref 4.5–11.5)

## 2023-05-18 PROCEDURE — 80053 COMPREHEN METABOLIC PANEL: CPT | Performed by: STUDENT IN AN ORGANIZED HEALTH CARE EDUCATION/TRAINING PROGRAM

## 2023-05-18 PROCEDURE — 11000004 HC SNF PRIVATE

## 2023-05-18 PROCEDURE — 97110 THERAPEUTIC EXERCISES: CPT

## 2023-05-18 PROCEDURE — 94760 N-INVAS EAR/PLS OXIMETRY 1: CPT

## 2023-05-18 PROCEDURE — 27000207 HC ISOLATION

## 2023-05-18 PROCEDURE — A4216 STERILE WATER/SALINE, 10 ML: HCPCS | Performed by: STUDENT IN AN ORGANIZED HEALTH CARE EDUCATION/TRAINING PROGRAM

## 2023-05-18 PROCEDURE — 85025 COMPLETE CBC W/AUTO DIFF WBC: CPT | Performed by: STUDENT IN AN ORGANIZED HEALTH CARE EDUCATION/TRAINING PROGRAM

## 2023-05-18 PROCEDURE — 63600175 PHARM REV CODE 636 W HCPCS: Performed by: STUDENT IN AN ORGANIZED HEALTH CARE EDUCATION/TRAINING PROGRAM

## 2023-05-18 PROCEDURE — 97606 NEG PRS WND THER DME>50 SQCM: CPT

## 2023-05-18 PROCEDURE — 25000003 PHARM REV CODE 250: Performed by: STUDENT IN AN ORGANIZED HEALTH CARE EDUCATION/TRAINING PROGRAM

## 2023-05-18 PROCEDURE — 94761 N-INVAS EAR/PLS OXIMETRY MLT: CPT

## 2023-05-18 PROCEDURE — 99900035 HC TECH TIME PER 15 MIN (STAT)

## 2023-05-18 RX ADMIN — ESCITALOPRAM 5 MG: 5 TABLET, FILM COATED ORAL at 08:05

## 2023-05-18 RX ADMIN — Medication 10 ML: at 06:05

## 2023-05-18 RX ADMIN — INSULIN ASPART 8 UNITS: 100 INJECTION, SOLUTION INTRAVENOUS; SUBCUTANEOUS at 04:05

## 2023-05-18 RX ADMIN — FOLIC ACID 2 MG: 1 TABLET ORAL at 08:05

## 2023-05-18 RX ADMIN — NIFEDIPINE 60 MG: 30 TABLET, FILM COATED, EXTENDED RELEASE ORAL at 08:05

## 2023-05-18 RX ADMIN — INSULIN ASPART 10 UNITS: 100 INJECTION, SOLUTION INTRAVENOUS; SUBCUTANEOUS at 07:05

## 2023-05-18 RX ADMIN — SODIUM CHLORIDE, PRESERVATIVE FREE 10 ML: 5 INJECTION INTRAVENOUS at 06:05

## 2023-05-18 RX ADMIN — INSULIN ASPART 10 UNITS: 100 INJECTION, SOLUTION INTRAVENOUS; SUBCUTANEOUS at 12:05

## 2023-05-18 RX ADMIN — SODIUM CHLORIDE, PRESERVATIVE FREE 10 ML: 5 INJECTION INTRAVENOUS at 05:05

## 2023-05-18 RX ADMIN — SODIUM CHLORIDE, PRESERVATIVE FREE 10 ML: 5 INJECTION INTRAVENOUS at 12:05

## 2023-05-18 RX ADMIN — CEFTRIAXONE SODIUM 1 G: 1 INJECTION, POWDER, FOR SOLUTION INTRAMUSCULAR; INTRAVENOUS at 12:05

## 2023-05-18 RX ADMIN — CLOBETASOL PROPIONATE: 0.5 CREAM TOPICAL at 09:05

## 2023-05-18 RX ADMIN — CARVEDILOL 25 MG: 12.5 TABLET, FILM COATED ORAL at 08:05

## 2023-05-18 RX ADMIN — FERROUS SULFATE TAB 325 MG (65 MG ELEMENTAL FE) 1 EACH: 325 (65 FE) TAB at 08:05

## 2023-05-18 RX ADMIN — SITAGLIPTIN 100 MG: 50 TABLET, FILM COATED ORAL at 08:05

## 2023-05-18 RX ADMIN — CLOBETASOL PROPIONATE: 0.5 CREAM TOPICAL at 08:05

## 2023-05-18 RX ADMIN — INSULIN ASPART 6 UNITS: 100 INJECTION, SOLUTION INTRAVENOUS; SUBCUTANEOUS at 12:05

## 2023-05-18 RX ADMIN — RIVAROXABAN 20 MG: 20 TABLET, FILM COATED ORAL at 06:05

## 2023-05-18 RX ADMIN — INSULIN ASPART 10 UNITS: 100 INJECTION, SOLUTION INTRAVENOUS; SUBCUTANEOUS at 04:05

## 2023-05-18 RX ADMIN — INSULIN ASPART 8 UNITS: 100 INJECTION, SOLUTION INTRAVENOUS; SUBCUTANEOUS at 05:05

## 2023-05-18 RX ADMIN — Medication 1 CAPSULE: at 04:05

## 2023-05-18 RX ADMIN — INSULIN DETEMIR 20 UNITS: 100 INJECTION, SOLUTION SUBCUTANEOUS at 08:05

## 2023-05-18 RX ADMIN — Medication 1 CAPSULE: at 07:05

## 2023-05-18 RX ADMIN — MELATONIN TAB 3 MG 9 MG: 3 TAB at 08:05

## 2023-05-18 RX ADMIN — Medication 1 CAPSULE: at 12:05

## 2023-05-18 RX ADMIN — LOPERAMIDE HYDROCHLORIDE 2 MG: 2 CAPSULE ORAL at 08:05

## 2023-05-18 RX ADMIN — HYDROCODONE BITARTRATE AND ACETAMINOPHEN 1 TABLET: 5; 325 TABLET ORAL at 04:05

## 2023-05-18 NOTE — PROGRESS NOTES
NPWT dressing changes performed to L trochanter and R ischium. Improvement continues to both sites with current regimen of twice weekly dressing changes. Applied crushed Metronidazole (home med) to wound beds to assist with odor. Applied Santyl to nonviable tissue of wound beds then black granufoam and wound vac drape applied. Seal maintained intact and continuous pressure of -125mmHg acquired of both sites prior to completing assessment. Specialty mattress, foam wedge, and heel boots in use. Recommend continuing with strict pressure prevention measures during hospitalization. Per Dr Jean's most recent orders, pt may be out of bed and in personal motorized chair for 2 hour intervals at a time. Pt, family, and staff aware. Orders in place.        05/18/23 1731   WOCN Assessment   WOCN Total Time (mins) 130   Visit Date 05/18/23   Visit Time 1600   Consult Type Follow Up   WOCN Speciality Wound   WOCN List wound vac   Wound pressure   Continence Type Fecal   Intervention assessed;chart review;orders   Teaching on-going   Skin Interventions   Device Skin Pressure Protection absorbent pad utilized/changed;positioning supports utilized;pressure points protected   Pressure Reduction Devices specialty bed utilized;positioning supports utilized   Pressure Reduction Techniques sit time limited to 2 hours;positioned off wounds;pressure points protected;weight shift assistance provided   Skin Protection incontinence pads utilized   Positioning   Body Position sitting up in bed   Head of Bed (HOB) Positioning HOB at 30-45 degrees   Positioning/Transfer Devices wedge;pillows   Pressure Injury Prevention    Check Moisture Management Pad Done   Heel protection technique Heel boot        Altered Skin Integrity 01/12/23 1530 Sacral spine Full thickness tissue loss with exposed bone, tendon, or muscle. Often includes undermining and tunneling. May extend into muscle and/or supporting structures.   Date First Assessed/Time First  Assessed: 01/12/23 1530   Altered Skin Integrity Present on Admission - Did Patient arrive to the hospital with altered skin?: yes  Location: Sacral spine  Description of Altered Skin Integrity: Full thickness tissue los...   Dressing Appearance Intact;Moist drainage   Drainage Amount Moderate   Drainage Characteristics/Odor Serosanguineous   Care Cleansed with:;Sterile normal saline   Dressing Applied;Sodium chloride impregnated;Gauze;Absorptive Pad;Other (comment)  (Santyl/Mesalt)   Periwound Care Skin barrier film applied        Altered Skin Integrity 01/12/23 1532 Right Ischial tuberosity Full thickness tissue loss with exposed bone, tendon, or muscle. Often includes undermining and tunneling. May extend into muscle and/or supporting structures.   Date First Assessed/Time First Assessed: 01/12/23 1532   Altered Skin Integrity Present on Admission - Did Patient arrive to the hospital with altered skin?: yes  Side: Right  Location: Ischial tuberosity  Description of Altered Skin Integrity: Full t...   Wound Image     Description of Altered Skin Integrity Full thickness tissue loss with exposed bone, tendon, or muscle. Often includes undermining and tunneling. May extend into muscle and/or supporting structures.   Dressing Appearance Intact;Moist drainage   Drainage Amount Moderate   Drainage Characteristics/Odor Serosanguineous   Appearance Red;Adipose;Granulating;Tan;Yellow;Slough;Muscle   Tissue loss description Full thickness   Red (%), Wound Tissue Color 65 %   Periwound Area Intact   Wound Edges Defined   Wound Length (cm) 11 cm   Wound Width (cm) 15 cm   Wound Depth (cm) 3 cm   Wound Volume (cm^3) 495 cm^3   Wound Surface Area (cm^2) 165 cm^2   Care Cleansed with:;Wound cleanser   Dressing Applied;Other (comment);Transparent film  (Crushed Metronidazole/Santyl/Black Granufoam)   Periwound Care Hydrocolloid barrier applied;Skin barrier film applied        Altered Skin Integrity Left Greater trochanter Full  thickness tissue loss. Base is covered by slough and/or eschar in the wound bed   No Date First Assessed or Time First Assessed found.   Altered Skin Integrity Present on Admission - Did Patient arrive to the hospital with altered skin?: yes  Side: Left  Location: Greater trochanter  Description of Altered Skin Integrity: Full thic...   Wound Image    Description of Altered Skin Integrity Full thickness tissue loss with exposed bone, tendon, or muscle. Often includes undermining and tunneling. May extend into muscle and/or supporting structures.   Dressing Appearance Intact;Moist drainage   Drainage Amount Moderate   Drainage Characteristics/Odor Serosanguineous   Appearance Red;Tan;Granulating;Slough;Muscle   Tissue loss description Full thickness   Red (%), Wound Tissue Color 80 %   Periwound Area Intact   Wound Edges Defined   Wound Length (cm) 3.3 cm   Wound Width (cm) 5.3 cm   Wound Depth (cm) 2 cm   Wound Volume (cm^3) 34.98 cm^3   Wound Surface Area (cm^2) 17.49 cm^2   Undermining (depth (cm)/location) 8 o'clock to 11 o'clock.4cm at 9 o'clock   Care Cleansed with:;Wound cleanser   Dressing Applied;Other (comment);Transparent film  (Santyl/Black Granufoam)   Dressing Change Due 05/22/23

## 2023-05-18 NOTE — PLAN OF CARE
[facility]  Discharge Reassessment    Primary Care Provider: Jennifer Fernando NP    Expected Discharge Date:     Reassessment (most recent)       Discharge Reassessment - 05/18/23 1532          Discharge Reassessment    Assessment Type Discharge Planning Reassessment     Did the patient's condition or plan change since previous assessment? No     Discharge Plan discussed with: Parent(s)     Name(s) and Number(s) Judy echeverria [phone]     Communicated SADE with patient/caregiver Date not available/Unable to determine     Discharge Plan A Home with family;Home     DME Needed Upon Discharge  none     Transition of Care Barriers None     Why the patient remains in the hospital Requires continued medical care        Post-Acute Status    Post-Acute Authorization Home Health     Home Health Status Pending medical clearance/testing     Hospital Resources/Appts/Education Provided Provided patient/caregiver with written discharge plan information     Discharge Delays None known at this time
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
## 2023-05-18 NOTE — PLAN OF CARE
Problem: Adult Inpatient Plan of Care  Goal: Plan of Care Review  Outcome: Ongoing, Progressing  Flowsheets (Taken 5/18/2023 1811)  Plan of Care Reviewed With: patient  Goal: Patient-Specific Goal (Individualized)  Outcome: Ongoing, Progressing  Flowsheets (Taken 5/18/2023 1811)  Anxieties, Fears or Concerns: wound healing  Individualized Care Needs: Sit in chair x2 hours and go outside for mental health during 7A shift  Goal: Absence of Hospital-Acquired Illness or Injury  Outcome: Ongoing, Progressing  Intervention: Identify and Manage Fall Risk  Flowsheets (Taken 5/18/2023 1811)  Safety Promotion/Fall Prevention:   assistive device/personal item within reach   bed alarm set   Fall Risk reviewed with patient/family   instructed to call staff for mobility  Intervention: Prevent Skin Injury  Flowsheets (Taken 5/18/2023 1811)  Body Position:   head facing, left   heels elevated   legs elevated  Skin Protection:   adhesive use limited   tubing/devices free from skin contact   incontinence pads utilized   skin sealant/moisture barrier applied  Intervention: Prevent and Manage VTE (Venous Thromboembolism) Risk  Flowsheets (Taken 5/18/2023 1811)  Activity Management: Rolling - L1  VTE Prevention/Management: ROM (passive) performed  Range of Motion: ROM (range of motion) performed  Intervention: Prevent Infection  Flowsheets (Taken 5/18/2023 1811)  Infection Prevention:   cohorting utilized   environmental surveillance performed   equipment surfaces disinfected   rest/sleep promoted   personal protective equipment utilized   hand hygiene promoted  Goal: Optimal Comfort and Wellbeing  Outcome: Ongoing, Progressing  Intervention: Monitor Pain and Promote Comfort  Flowsheets (Taken 5/18/2023 1811)  Pain Management Interventions:   quiet environment facilitated   position adjusted   diversional activity provided  Intervention: Provide Person-Centered Care  Flowsheets (Taken 5/18/2023 1811)  Trust Relationship/Rapport:    care explained   choices provided   emotional support provided   empathic listening provided   questions answered   questions encouraged   reassurance provided   thoughts/feelings acknowledged  Goal: Readiness for Transition of Care  Outcome: Ongoing, Progressing     Problem: Diabetes Comorbidity  Goal: Blood Glucose Level Within Targeted Range  Outcome: Ongoing, Progressing  Intervention: Monitor and Manage Glycemia  Flowsheets (Taken 5/18/2023 1811)  Glycemic Management:   blood glucose monitored   supplemental insulin given     Problem: Adjustment to Illness (Sepsis/Septic Shock)  Goal: Optimal Coping  Outcome: Ongoing, Progressing  Intervention: Optimize Psychosocial Adjustment to Illness  Flowsheets (Taken 5/18/2023 1811)  Supportive Measures:   verbalization of feelings encouraged   active listening utilized   goal-setting facilitated  Family/Support System Care:   caregiver stress acknowledged   family care conference arranged   involvement promoted     Problem: Glycemic Control Impaired (Sepsis/Septic Shock)  Goal: Blood Glucose Level Within Desired Range  Outcome: Ongoing, Progressing  Intervention: Optimize Glycemic Control  Flowsheets (Taken 5/18/2023 1811)  Glycemic Management:   blood glucose monitored   supplemental insulin given     Problem: Infection Progression (Sepsis/Septic Shock)  Goal: Absence of Infection Signs and Symptoms  Outcome: Ongoing, Progressing  Intervention: Initiate Sepsis Management  Flowsheets (Taken 5/18/2023 1811)  Infection Prevention:   cohorting utilized   environmental surveillance performed   equipment surfaces disinfected   rest/sleep promoted   personal protective equipment utilized   hand hygiene promoted  Infection Management: aseptic technique maintained  Stabilization Measures: legs elevated  Isolation Precautions: precautions maintained  Intervention: Promote Stabilization  Flowsheets (Taken 5/18/2023 1811)  Fever Reduction/Comfort Measures: fluid intake  increased  Intervention: Promote Recovery  Flowsheets (Taken 5/18/2023 1811)  Sleep/Rest Enhancement:   room darkened   regular sleep/rest pattern promoted  Activity Management: Rolling - L1     Problem: Infection  Goal: Absence of Infection Signs and Symptoms  Outcome: Ongoing, Progressing     Problem: Impaired Wound Healing  Goal: Optimal Wound Healing  Outcome: Ongoing, Progressing  Intervention: Promote Wound Healing  Flowsheets (Taken 5/18/2023 1811)  Sleep/Rest Enhancement:   room darkened   regular sleep/rest pattern promoted  Activity Management: Rolling - L1  Pain Management Interventions:   quiet environment facilitated   position adjusted   diversional activity provided     Problem: Skin Injury Risk Increased  Goal: Skin Health and Integrity  Outcome: Ongoing, Progressing     Problem: Fall Injury Risk  Goal: Absence of Fall and Fall-Related Injury  Outcome: Ongoing, Progressing

## 2023-05-18 NOTE — PT/OT/SLP PROGRESS
Physical Therapy Treatment Note           Name: Antonio Galvan II    : 1965 (57 y.o.)  MRN: [id number]           TREATMENT SUMMARY AND RECOMMENDATIONS:    PT Received On: 23  PT Start Time: 1000     PT Stop Time: 1030  PT Total Time (min): 30 min     Subjective Assessment:   No complaints  Lethargic   x Awake, alert, cooperative  Uncooperative    Agitated  c/o pain    Appropriate  c/o fatigue    Confused x Treated at bedside     Emotionally labile  Treated in gym/dept.    Impulsive  Other:    Flat affect       Therapy Precautions:    Cognitive deficits  Spinal precautions    Collar - hard  Sternal precautions    Collar - soft   TLSO    Fall risk  LSO    Hip precautions - posterior  Knee immobilizer    Hip precautions - anterior  WBAT    Impaired communication  Partial weightbearing    Oxygen  TTWB    PEG tube  NWB    Visual deficits  Other:    Hearing deficits          Treatment Objectives:     Mobility Training:   Assist level Comments    Bed mobility     Transfer     Gait     Sit to stand transitions     Sitting balance     Standing balance      Wheelchair mobility     Car transfer     Other:          Therapeutic Exercise:   Exercise Sets Reps Comments   PROM to B LE and UE  20 All functional planes to end range                         Additional Comments:  Pt with reports feeling better today. Will get up with caregiver once she is here. Pt positioned with heel protectors and pillows to reduce skin break down and reduce swelling. Increased swelling noted at R hand today.     MD upgraded OOB time to 2 hours    Assessment: Patient tolerated session well.    PT Plan: continue per POC  Revisions made to plan of care: No    GOALS:   Multidisciplinary Problems       Physical Therapy Goals          Problem: Physical Therapy    Goal Priority Disciplines Outcome Goal Variances Interventions   Physical Therapy Goal     PT, PT/OT Ongoing, Progressing     Description: Description: Goals to be met by:  Discharge      Patient will increase functional independence with mobility by performin. Pt to be able to tolerate ROM tasks to B UE and LE 2 x/day with 25% improvement/carryover demonstrated allowing for improved positioning to reduce risk of further skin break down.                          Skilled PT Minutes Provided: 25   Communication with Treatment Team:     Equipment recommendations:       At end of treatment, patient remained:   Up in chair     Up in wheelchair in room   x In bed    With alarm activated    Bed rails up   x Call bell in reach    x Family/friends present    Restraints secured properly    In bathroom with CNA/RN notified    Nurse aware    In gym with therapist/tech    Other:

## 2023-05-18 NOTE — NURSING
Nurses Note -- 4 Eyes      5/18/2023   4:58 AM      Skin assessed during: Daily Assessment      [] No Altered Skin Integrity Present    []Prevention Measures Documented      [x] Yes- Altered Skin Integrity Present or Discovered   [x] LDA Added if Not in Epic (Describe Wound)   [x] New Altered Skin Integrity was Present on Admit and Documented in LDA   [x] Wound Image Taken    Wound Care Consulted? No    Attending Nurse:  Christa Vidales LPN     Second RN/Staff Member:  VARSHA Brantley

## 2023-05-19 LAB
ANION GAP SERPL CALC-SCNC: 10 MEQ/L
BASOPHILS # BLD AUTO: 0.01 X10(3)/MCL
BASOPHILS NFR BLD AUTO: 0.2 %
BUN SERPL-MCNC: 56.5 MG/DL (ref 8.4–25.7)
CALCIUM SERPL-MCNC: 8.6 MG/DL (ref 8.4–10.2)
CHLORIDE SERPL-SCNC: 104 MMOL/L (ref 98–107)
CO2 SERPL-SCNC: 24 MMOL/L (ref 22–29)
CREAT SERPL-MCNC: 1.61 MG/DL (ref 0.73–1.18)
CREAT/UREA NIT SERPL: 35
EOSINOPHIL # BLD AUTO: 0.03 X10(3)/MCL (ref 0–0.9)
EOSINOPHIL NFR BLD AUTO: 0.5 %
ERYTHROCYTE [DISTWIDTH] IN BLOOD BY AUTOMATED COUNT: 26.7 % (ref 11.5–17)
GFR SERPLBLD CREATININE-BSD FMLA CKD-EPI: 50 MLS/MIN/1.73/M2
GLUCOSE SERPL-MCNC: 218 MG/DL (ref 74–100)
HCT VFR BLD AUTO: 27.9 % (ref 42–52)
HGB BLD-MCNC: 8.1 G/DL (ref 14–18)
IMM GRANULOCYTES # BLD AUTO: 0.02 X10(3)/MCL (ref 0–0.04)
IMM GRANULOCYTES NFR BLD AUTO: 0.3 %
LYMPHOCYTES # BLD AUTO: 1.72 X10(3)/MCL (ref 0.6–4.6)
LYMPHOCYTES NFR BLD AUTO: 26.4 %
MCH RBC QN AUTO: 22.7 PG (ref 27–31)
MCHC RBC AUTO-ENTMCNC: 29 G/DL (ref 33–36)
MCV RBC AUTO: 78.2 FL (ref 80–94)
MONOCYTES # BLD AUTO: 0.36 X10(3)/MCL (ref 0.1–1.3)
MONOCYTES NFR BLD AUTO: 5.5 %
NEUTROPHILS # BLD AUTO: 4.37 X10(3)/MCL (ref 2.1–9.2)
NEUTROPHILS NFR BLD AUTO: 67.1 %
PLATELET # BLD AUTO: 226 X10(3)/MCL (ref 130–400)
PMV BLD AUTO: 9.7 FL (ref 7.4–10.4)
POCT GLUCOSE: 167 MG/DL (ref 70–110)
POCT GLUCOSE: 192 MG/DL (ref 70–110)
POCT GLUCOSE: 213 MG/DL (ref 70–110)
POCT GLUCOSE: 215 MG/DL (ref 70–110)
POTASSIUM SERPL-SCNC: 4.4 MMOL/L (ref 3.5–5.1)
RBC # BLD AUTO: 3.57 X10(6)/MCL (ref 4.7–6.1)
SODIUM SERPL-SCNC: 138 MMOL/L (ref 136–145)
WBC # SPEC AUTO: 6.51 X10(3)/MCL (ref 4.5–11.5)

## 2023-05-19 PROCEDURE — A4216 STERILE WATER/SALINE, 10 ML: HCPCS | Performed by: STUDENT IN AN ORGANIZED HEALTH CARE EDUCATION/TRAINING PROGRAM

## 2023-05-19 PROCEDURE — 80048 BASIC METABOLIC PNL TOTAL CA: CPT | Performed by: FAMILY MEDICINE

## 2023-05-19 PROCEDURE — 27000207 HC ISOLATION

## 2023-05-19 PROCEDURE — 25000003 PHARM REV CODE 250: Performed by: STUDENT IN AN ORGANIZED HEALTH CARE EDUCATION/TRAINING PROGRAM

## 2023-05-19 PROCEDURE — 94760 N-INVAS EAR/PLS OXIMETRY 1: CPT

## 2023-05-19 PROCEDURE — 63600175 PHARM REV CODE 636 W HCPCS: Performed by: STUDENT IN AN ORGANIZED HEALTH CARE EDUCATION/TRAINING PROGRAM

## 2023-05-19 PROCEDURE — 11000004 HC SNF PRIVATE

## 2023-05-19 PROCEDURE — 97110 THERAPEUTIC EXERCISES: CPT

## 2023-05-19 PROCEDURE — 85025 COMPLETE CBC W/AUTO DIFF WBC: CPT | Performed by: FAMILY MEDICINE

## 2023-05-19 RX ORDER — ENOXAPARIN SODIUM 100 MG/ML
1 INJECTION SUBCUTANEOUS EVERY 24 HOURS
Status: DISCONTINUED | OUTPATIENT
Start: 2023-05-19 | End: 2023-05-20

## 2023-05-19 RX ADMIN — Medication 1 CAPSULE: at 05:05

## 2023-05-19 RX ADMIN — INSULIN ASPART 2 UNITS: 100 INJECTION, SOLUTION INTRAVENOUS; SUBCUTANEOUS at 08:05

## 2023-05-19 RX ADMIN — SODIUM CHLORIDE, PRESERVATIVE FREE 10 ML: 5 INJECTION INTRAVENOUS at 01:05

## 2023-05-19 RX ADMIN — Medication 1 CAPSULE: at 12:05

## 2023-05-19 RX ADMIN — SITAGLIPTIN 100 MG: 50 TABLET, FILM COATED ORAL at 09:05

## 2023-05-19 RX ADMIN — MELATONIN TAB 3 MG 9 MG: 3 TAB at 08:05

## 2023-05-19 RX ADMIN — CLOBETASOL PROPIONATE: 0.5 CREAM TOPICAL at 08:05

## 2023-05-19 RX ADMIN — CARVEDILOL 25 MG: 12.5 TABLET, FILM COATED ORAL at 09:05

## 2023-05-19 RX ADMIN — INSULIN ASPART 10 UNITS: 100 INJECTION, SOLUTION INTRAVENOUS; SUBCUTANEOUS at 05:05

## 2023-05-19 RX ADMIN — HYDROCODONE BITARTRATE AND ACETAMINOPHEN 1 TABLET: 5; 325 TABLET ORAL at 08:05

## 2023-05-19 RX ADMIN — Medication 1 CAPSULE: at 09:05

## 2023-05-19 RX ADMIN — CLOBETASOL PROPIONATE: 0.5 CREAM TOPICAL at 09:05

## 2023-05-19 RX ADMIN — SODIUM CHLORIDE, PRESERVATIVE FREE 10 ML: 5 INJECTION INTRAVENOUS at 12:05

## 2023-05-19 RX ADMIN — CEFTRIAXONE SODIUM 1 G: 1 INJECTION, POWDER, FOR SOLUTION INTRAMUSCULAR; INTRAVENOUS at 12:05

## 2023-05-19 RX ADMIN — NIFEDIPINE 60 MG: 30 TABLET, FILM COATED, EXTENDED RELEASE ORAL at 09:05

## 2023-05-19 RX ADMIN — INSULIN ASPART 10 UNITS: 100 INJECTION, SOLUTION INTRAVENOUS; SUBCUTANEOUS at 09:05

## 2023-05-19 RX ADMIN — FERROUS SULFATE TAB 325 MG (65 MG ELEMENTAL FE) 1 EACH: 325 (65 FE) TAB at 09:05

## 2023-05-19 RX ADMIN — SODIUM CHLORIDE, PRESERVATIVE FREE 10 ML: 5 INJECTION INTRAVENOUS at 05:05

## 2023-05-19 RX ADMIN — COLLAGENASE SANTYL: 250 OINTMENT TOPICAL at 09:05

## 2023-05-19 RX ADMIN — ENOXAPARIN SODIUM 100 MG: 100 INJECTION SUBCUTANEOUS at 06:05

## 2023-05-19 RX ADMIN — HYDROCODONE BITARTRATE AND ACETAMINOPHEN 1 TABLET: 10; 325 TABLET ORAL at 03:05

## 2023-05-19 RX ADMIN — LOPERAMIDE HYDROCHLORIDE 2 MG: 2 CAPSULE ORAL at 09:05

## 2023-05-19 RX ADMIN — FOLIC ACID 2 MG: 1 TABLET ORAL at 09:05

## 2023-05-19 RX ADMIN — Medication 1 ENEMA: at 04:05

## 2023-05-19 RX ADMIN — INSULIN DETEMIR 20 UNITS: 100 INJECTION, SOLUTION SUBCUTANEOUS at 08:05

## 2023-05-19 RX ADMIN — ESCITALOPRAM 5 MG: 5 TABLET, FILM COATED ORAL at 09:05

## 2023-05-19 RX ADMIN — INSULIN ASPART 4 UNITS: 100 INJECTION, SOLUTION INTRAVENOUS; SUBCUTANEOUS at 05:05

## 2023-05-19 RX ADMIN — INSULIN ASPART 10 UNITS: 100 INJECTION, SOLUTION INTRAVENOUS; SUBCUTANEOUS at 12:05

## 2023-05-19 NOTE — PROGRESS NOTES
Inpatient Nutrition Evaluation    Admit Date: 5/12/2023   Total duration of encounter: 7 days    Nutrition Recommendation/Prescription     Continue diabetic diet.   2. Add boost glucose control with meals.  Nutrition Assessment     Chart Review    Reason Seen: continuous nutrition monitoring, length of stay, and follow-up    Malnutrition Screening Tool Results   Have you recently lost weight without trying?: Yes: 34 lbs or more  Have you been eating poorly because of a decreased appetite?: No   MST Score: 4     Diagnosis:  Open wound of left hip    Relevant Medical History: A-fib  Date Unknown  Cardiac arrest   Date Unknown  Chronic skin ulcer  Date Unknown  DM (diabetes mellitus)  Date Unknown  Infected decubitus ulcer  Date Unknown  Lymphedema of leg  Date Unknown  Neurogenic bladder  Date Unknown  Obesity, unspecified  Date Unknown  Pressure ulcer of heel  Date Unknown  Pressure ulcer of right foot, stage 3  Date Unknown  Pressure ulcer of right ischium  Date Unknown  Quadriplegia  Date Unknown  Quadriplegic spinal paralysis    Nutrition-Related Medications: ceftriaxone, ferrous sulfate, folic acid, insulin aspart, insulin detemir, lactobacillus acidophilus,     Nutrition-Related Labs:   Latest Reference Range & Units 05/19/23 06:29   Sodium 136 - 145 mmol/L 138   Potassium 3.5 - 5.1 mmol/L 4.4   Chloride 98 - 107 mmol/L 104   CO2 22 - 29 mmol/L 24   Anion Gap mEq/L 10.0   BUN 8.4 - 25.7 mg/dL 56.5 (H)   Creatinine 0.73 - 1.18 mg/dL 1.61 (H)   BUN/CREAT RATIO  35   eGFR mls/min/1.73/m2 50   Glucose 74 - 100 mg/dL 218 (H)   Calcium 8.4 - 10.2 mg/dL 8.6   (H): Data is abnormally high    Diet Order: Diet diabetic  Oral Supplement Order: none  Appetite/Oral Intake: good/50-75% of meals  Factors Affecting Nutritional Intake: sore mouth  Food/[employer]/Cultural Preferences:  soft foods  Food Allergies: none reported    Skin Integrity: wound  Wound(s):      Altered Skin Integrity 05/06/22 1140 Right Heel  Full thickness  "tissue loss. Subcutaneous fat may be visible but bone, tendon or muscle are not exposed-Tissue loss description: Full thickness       Altered Skin Integrity 05/08/23 1639 Left Ischial tuberosity Partial thickness tissue loss. Shallow open ulcer with a red or pink wound bed, without slough. Intact or Open/Ruptured Serum-filled blister.-Tissue loss description: Partial thickness       Altered Skin Integrity 01/12/23 1530 Sacral spine Full thickness tissue loss with exposed bone, tendon, or muscle. Often includes undermining and tunneling. May extend into muscle and/or supporting structures.-Tissue loss description: Full thickness       Altered Skin Integrity 01/12/23 1532 Right Ischial tuberosity Full thickness tissue loss with exposed bone, tendon, or muscle. Often includes undermining and tunneling. May extend into muscle and/or supporting structures.-Tissue loss description: Full thickness       Altered Skin Integrity Left Greater trochanter Full thickness tissue loss. Base is covered by slough and/or eschar in the wound bed-Tissue loss description: Full thickness     Comments    Pt reports mouth feeling better. Adjust menus. Pt eating outside food as well. Discussed with wound care nurse. Requested prealbumin, due to wound vac.  Wound care nurse stated. improved. Discussed with nursing.     Anthropometrics    Height: 5' 8" (172.7 cm)    Last Weight: 100.7 kg (222 lb) (05/12/23 1500) Weight Method: Bed Scale  BMI (Calculated): 33.8  BMI Classification: obese grade I (BMI 30-34.9)        Ideal Body Weight (IBW), Male: 154 lb     % Ideal Body Weight, Male (lb): 144.16 %                          Usual Weight Provided By: not applicable    Wt Readings from Last 5 Encounters:   05/12/23 100.7 kg (222 lb)   05/08/23 100.7 kg (222 lb)   02/10/23 117.9 kg (260 lb)   12/28/22 117.9 kg (260 lb)   12/01/22 117.9 kg (260 lb)     Weight Change(s) Since Admission:  Admit Weight: 100.7 kg (222 lb) (05/12/23 1500)      Patient " Education    Not applicable.    Monitoring & Evaluation     Dietitian will monitor food and beverage intake, weight, weight change, electrolyte/renal panel, glucose/endocrine profile, and gastrointestinal profile.  Nutrition Risk/Follow-Up: low (follow-up in 5-7 days)  Patients assigned 'low nutrition risk' status do not qualify for a full nutritional assessment but will be monitored and re-evaluated in a 5-7 day time period. Please consult if re-evaluation needed sooner.

## 2023-05-19 NOTE — PLAN OF CARE
Problem: Adult Inpatient Plan of Care  Goal: Plan of Care Review  Outcome: Ongoing, Progressing  Flowsheets (Taken 5/19/2023 1357)  Plan of Care Reviewed With:   patient   caregiver  Goal: Patient-Specific Goal (Individualized)  Outcome: Ongoing, Progressing  Flowsheets (Taken 5/19/2023 1357)  Anxieties, Fears or Concerns: wound healing  Individualized Care Needs: Assist with ADL's and administer ABX  Goal: Absence of Hospital-Acquired Illness or Injury  Outcome: Ongoing, Progressing  Intervention: Identify and Manage Fall Risk  Flowsheets (Taken 5/19/2023 1357)  Safety Promotion/Fall Prevention:   assistive device/personal item within reach   bed alarm set   room near unit station   instructed to call staff for mobility   family to remain at bedside  Intervention: Prevent Skin Injury  Flowsheets (Taken 5/19/2023 1357)  Body Position:   30 degrees   heels elevated   sitting up in bed  Skin Protection:   adhesive use limited   tubing/devices free from skin contact   skin sealant/moisture barrier applied   incontinence pads utilized  Intervention: Prevent and Manage VTE (Venous Thromboembolism) Risk  Flowsheets (Taken 5/19/2023 1357)  Activity Management:   Patient unable to perform activities   Rolling - L1  VTE Prevention/Management:   bleeding risk assessed   fluids promoted  Intervention: Prevent Infection  Flowsheets (Taken 5/19/2023 1357)  Infection Prevention:   cohorting utilized   hand hygiene promoted   environmental surveillance performed   equipment surfaces disinfected   rest/sleep promoted   single patient room provided   personal protective equipment utilized  Goal: Optimal Comfort and Wellbeing  Outcome: Ongoing, Progressing  Intervention: Monitor Pain and Promote Comfort  Flowsheets (Taken 5/19/2023 1357)  Pain Management Interventions:   quiet environment facilitated   relaxation techniques promoted   pillow support provided   care clustered  Intervention: Provide Person-Centered Care  Flowsheets  (Taken 5/19/2023 1357)  Trust Relationship/Rapport:   care explained   choices provided   emotional support provided   empathic listening provided   thoughts/feelings acknowledged   reassurance provided   questions encouraged   questions answered     Problem: Diabetes Comorbidity  Goal: Blood Glucose Level Within Targeted Range  Outcome: Ongoing, Progressing  Intervention: Monitor and Manage Glycemia  Flowsheets (Taken 5/19/2023 1357)  Glycemic Management:   blood glucose monitored   carbohydrate replacement provided   supplemental insulin given     Problem: Adjustment to Illness (Sepsis/Septic Shock)  Goal: Optimal Coping  Outcome: Ongoing, Progressing  Intervention: Optimize Psychosocial Adjustment to Illness  Flowsheets (Taken 5/19/2023 1357)  Supportive Measures:   active listening utilized   decision-making supported   positive reinforcement provided   relaxation techniques promoted  Family/Support System Care: support provided     Problem: Infection  Goal: Absence of Infection Signs and Symptoms  Outcome: Ongoing, Progressing     Problem: Impaired Wound Healing  Goal: Optimal Wound Healing  Outcome: Ongoing, Progressing  Intervention: Promote Wound Healing  Flowsheets (Taken 5/19/2023 1357)  Oral Nutrition Promotion:   calorie-dense foods provided   rest periods promoted  Activity Management:   Patient unable to perform activities   Rolling - L1  Pain Management Interventions:   quiet environment facilitated   relaxation techniques promoted   pillow support provided   care clustered     Problem: Skin Injury Risk Increased  Goal: Skin Health and Integrity  Outcome: Ongoing, Progressing     Problem: Fall Injury Risk  Goal: Absence of Fall and Fall-Related Injury  Outcome: Ongoing, Progressing

## 2023-05-19 NOTE — PLAN OF CARE
Problem: Adult Inpatient Plan of Care  Goal: Patient-Specific Goal (Individualized)  Outcome: Ongoing, Progressing  Flowsheets (Taken 5/18/2023 2301)  Individualized Care Needs: Assist with turning patient q2 hours during shift     Problem: Diabetes Comorbidity  Goal: Blood Glucose Level Within Targeted Range  Outcome: Ongoing, Progressing  Intervention: Monitor and Manage Glycemia  Flowsheets (Taken 5/18/2023 2301)  Glycemic Management:   blood glucose monitored   supplemental insulin given

## 2023-05-19 NOTE — PT/OT/SLP PROGRESS
Physical Therapy Treatment Note           Name: Antonio Galvan II    : 1965 (57 y.o.)  MRN: [id number]           TREATMENT SUMMARY AND RECOMMENDATIONS:    PT Received On: 23  PT Start Time: 955     PT Stop Time:   PT Total Time (min): 24 min     Subjective Assessment:   No complaints  Lethargic   x Awake, alert, cooperative  Uncooperative    Agitated  c/o pain    Appropriate  c/o fatigue    Confused x Treated at bedside     Emotionally labile  Treated in gym/dept.    Impulsive  Other:    Flat affect       Therapy Precautions:    Cognitive deficits  Spinal precautions    Collar - hard  Sternal precautions    Collar - soft   TLSO    Fall risk  LSO    Hip precautions - posterior  Knee immobilizer    Hip precautions - anterior  WBAT    Impaired communication  Partial weightbearing    Oxygen  TTWB    PEG tube  NWB    Visual deficits  Other:    Hearing deficits          Treatment Objectives:     Mobility Training:   Assist level Comments    Bed mobility     Transfer     Gait     Sit to stand transitions     Sitting balance     Standing balance      Wheelchair mobility     Car transfer     Other:          Therapeutic Exercise:   Exercise Sets Reps Comments   PROM to B LE and UE  20 All functional planes to end range                         Additional Comments:  Pt with reports feeling better today, mouth feeling better and able to eat. Will get up with caregiver when ready. Educated on 2 hour limit in chair.  Pt positioned with heel protectors and pillows to reduce skin break down and reduce swelling. Less swelling noted at R hand today.     Assessment: Patient tolerated session well.    PT Plan: continue per POC  Revisions made to plan of care: No    GOALS:   Multidisciplinary Problems       Physical Therapy Goals          Problem: Physical Therapy    Goal Priority Disciplines Outcome Goal Variances Interventions   Physical Therapy Goal     PT, PT/OT Ongoing, Progressing     Description:  Description: Goals to be met by: Discharge      Patient will increase functional independence with mobility by performin. Pt to be able to tolerate ROM tasks to B UE and LE 2 x/day with 25% improvement/carryover demonstrated allowing for improved positioning to reduce risk of further skin break down.                          Skilled PT Minutes Provided: 25   Communication with Treatment Team:     Equipment recommendations:       At end of treatment, patient remained:   Up in chair     Up in wheelchair in room   x In bed    With alarm activated    Bed rails up   x Call bell in reach    x Family/friends present    Restraints secured properly    In bathroom with CNA/RN notified    Nurse aware    In gym with therapist/tech    Other:
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
## 2023-05-20 LAB
ALBUMIN SERPL-MCNC: 2.5 G/DL (ref 3.5–5)
ALBUMIN/GLOB SERPL: 0.7 RATIO (ref 1.1–2)
ALP SERPL-CCNC: 95 UNIT/L (ref 40–150)
ALT SERPL-CCNC: 16 UNIT/L (ref 0–55)
ANISOCYTOSIS BLD QL SMEAR: ABNORMAL
AST SERPL-CCNC: 13 UNIT/L (ref 5–34)
BASOPHILS # BLD AUTO: 0.01 X10(3)/MCL
BASOPHILS NFR BLD AUTO: 0.2 %
BILIRUBIN DIRECT+TOT PNL SERPL-MCNC: 0.2 MG/DL
BUN SERPL-MCNC: 65.6 MG/DL (ref 8.4–25.7)
CALCIUM SERPL-MCNC: 8.7 MG/DL (ref 8.4–10.2)
CHLORIDE SERPL-SCNC: 106 MMOL/L (ref 98–107)
CO2 SERPL-SCNC: 21 MMOL/L (ref 22–29)
CREAT SERPL-MCNC: 1.64 MG/DL (ref 0.73–1.18)
EOSINOPHIL # BLD AUTO: 0.1 X10(3)/MCL (ref 0–0.9)
EOSINOPHIL NFR BLD AUTO: 1.7 %
ERYTHROCYTE [DISTWIDTH] IN BLOOD BY AUTOMATED COUNT: 27.4 % (ref 11.5–17)
GFR SERPLBLD CREATININE-BSD FMLA CKD-EPI: 49 MLS/MIN/1.73/M2
GLOBULIN SER-MCNC: 3.8 GM/DL (ref 2.4–3.5)
GLUCOSE SERPL-MCNC: 169 MG/DL (ref 74–100)
HCT VFR BLD AUTO: 27.6 % (ref 42–52)
HGB BLD-MCNC: 7.9 G/DL (ref 14–18)
HYPOCHROMIA BLD QL SMEAR: ABNORMAL
IMM GRANULOCYTES # BLD AUTO: 0.02 X10(3)/MCL (ref 0–0.04)
IMM GRANULOCYTES NFR BLD AUTO: 0.3 %
LYMPHOCYTES # BLD AUTO: 1.92 X10(3)/MCL (ref 0.6–4.6)
LYMPHOCYTES NFR BLD AUTO: 32.3 %
MAGNESIUM SERPL-MCNC: 2.2 MG/DL (ref 1.6–2.6)
MCH RBC QN AUTO: 22.7 PG (ref 27–31)
MCHC RBC AUTO-ENTMCNC: 28.6 G/DL (ref 33–36)
MCV RBC AUTO: 79.3 FL (ref 80–94)
MONOCYTES # BLD AUTO: 0.4 X10(3)/MCL (ref 0.1–1.3)
MONOCYTES NFR BLD AUTO: 6.7 %
NEUTROPHILS # BLD AUTO: 3.5 X10(3)/MCL (ref 2.1–9.2)
NEUTROPHILS NFR BLD AUTO: 58.8 %
PLATELET # BLD AUTO: 230 X10(3)/MCL (ref 130–400)
PLATELET # BLD EST: NORMAL 10*3/UL
PMV BLD AUTO: 10.1 FL (ref 7.4–10.4)
POCT GLUCOSE: 178 MG/DL (ref 70–110)
POCT GLUCOSE: 183 MG/DL (ref 70–110)
POCT GLUCOSE: 212 MG/DL (ref 70–110)
POCT GLUCOSE: 218 MG/DL (ref 70–110)
POTASSIUM SERPL-SCNC: 4.9 MMOL/L (ref 3.5–5.1)
PREALB SERPL-MCNC: 27 MG/DL (ref 18–45)
PROT SERPL-MCNC: 6.3 GM/DL (ref 6.4–8.3)
RBC # BLD AUTO: 3.48 X10(6)/MCL (ref 4.7–6.1)
RBC MORPH BLD: ABNORMAL
SODIUM SERPL-SCNC: 138 MMOL/L (ref 136–145)
WBC # SPEC AUTO: 5.95 X10(3)/MCL (ref 4.5–11.5)

## 2023-05-20 PROCEDURE — 94761 N-INVAS EAR/PLS OXIMETRY MLT: CPT

## 2023-05-20 PROCEDURE — 63600175 PHARM REV CODE 636 W HCPCS: Performed by: STUDENT IN AN ORGANIZED HEALTH CARE EDUCATION/TRAINING PROGRAM

## 2023-05-20 PROCEDURE — 27000207 HC ISOLATION

## 2023-05-20 PROCEDURE — 84134 ASSAY OF PREALBUMIN: CPT | Performed by: STUDENT IN AN ORGANIZED HEALTH CARE EDUCATION/TRAINING PROGRAM

## 2023-05-20 PROCEDURE — A4216 STERILE WATER/SALINE, 10 ML: HCPCS | Performed by: STUDENT IN AN ORGANIZED HEALTH CARE EDUCATION/TRAINING PROGRAM

## 2023-05-20 PROCEDURE — 11000004 HC SNF PRIVATE

## 2023-05-20 PROCEDURE — 83735 ASSAY OF MAGNESIUM: CPT | Performed by: STUDENT IN AN ORGANIZED HEALTH CARE EDUCATION/TRAINING PROGRAM

## 2023-05-20 PROCEDURE — 25000003 PHARM REV CODE 250: Performed by: STUDENT IN AN ORGANIZED HEALTH CARE EDUCATION/TRAINING PROGRAM

## 2023-05-20 PROCEDURE — 80053 COMPREHEN METABOLIC PANEL: CPT | Performed by: STUDENT IN AN ORGANIZED HEALTH CARE EDUCATION/TRAINING PROGRAM

## 2023-05-20 PROCEDURE — 85025 COMPLETE CBC W/AUTO DIFF WBC: CPT | Performed by: STUDENT IN AN ORGANIZED HEALTH CARE EDUCATION/TRAINING PROGRAM

## 2023-05-20 PROCEDURE — 99900035 HC TECH TIME PER 15 MIN (STAT)

## 2023-05-20 RX ORDER — ENOXAPARIN SODIUM 100 MG/ML
1 INJECTION SUBCUTANEOUS EVERY 12 HOURS
Status: DISCONTINUED | OUTPATIENT
Start: 2023-05-20 | End: 2023-05-25 | Stop reason: HOSPADM

## 2023-05-20 RX ADMIN — INSULIN ASPART 10 UNITS: 100 INJECTION, SOLUTION INTRAVENOUS; SUBCUTANEOUS at 05:05

## 2023-05-20 RX ADMIN — Medication 1 CAPSULE: at 11:05

## 2023-05-20 RX ADMIN — FOLIC ACID 2 MG: 1 TABLET ORAL at 09:05

## 2023-05-20 RX ADMIN — CLOBETASOL PROPIONATE: 0.5 CREAM TOPICAL at 11:05

## 2023-05-20 RX ADMIN — CARVEDILOL 25 MG: 12.5 TABLET, FILM COATED ORAL at 09:05

## 2023-05-20 RX ADMIN — CLOBETASOL PROPIONATE: 0.5 CREAM TOPICAL at 09:05

## 2023-05-20 RX ADMIN — HYDROCODONE BITARTRATE AND ACETAMINOPHEN 1 TABLET: 5; 325 TABLET ORAL at 03:05

## 2023-05-20 RX ADMIN — SODIUM CHLORIDE, PRESERVATIVE FREE 10 ML: 5 INJECTION INTRAVENOUS at 11:05

## 2023-05-20 RX ADMIN — LOPERAMIDE HYDROCHLORIDE 2 MG: 2 CAPSULE ORAL at 09:05

## 2023-05-20 RX ADMIN — CEFTRIAXONE SODIUM 1 G: 1 INJECTION, POWDER, FOR SOLUTION INTRAMUSCULAR; INTRAVENOUS at 11:05

## 2023-05-20 RX ADMIN — ENOXAPARIN SODIUM 100 MG: 100 INJECTION SUBCUTANEOUS at 02:05

## 2023-05-20 RX ADMIN — SODIUM CHLORIDE, PRESERVATIVE FREE 10 ML: 5 INJECTION INTRAVENOUS at 12:05

## 2023-05-20 RX ADMIN — Medication 1 CAPSULE: at 09:05

## 2023-05-20 RX ADMIN — NIFEDIPINE 60 MG: 30 TABLET, FILM COATED, EXTENDED RELEASE ORAL at 09:05

## 2023-05-20 RX ADMIN — MELATONIN TAB 3 MG 9 MG: 3 TAB at 08:05

## 2023-05-20 RX ADMIN — INSULIN ASPART 2 UNITS: 100 INJECTION, SOLUTION INTRAVENOUS; SUBCUTANEOUS at 05:05

## 2023-05-20 RX ADMIN — HYDROCODONE BITARTRATE AND ACETAMINOPHEN 1 TABLET: 10; 325 TABLET ORAL at 11:05

## 2023-05-20 RX ADMIN — INSULIN ASPART 10 UNITS: 100 INJECTION, SOLUTION INTRAVENOUS; SUBCUTANEOUS at 11:05

## 2023-05-20 RX ADMIN — COLLAGENASE SANTYL: 250 OINTMENT TOPICAL at 11:05

## 2023-05-20 RX ADMIN — INSULIN ASPART 10 UNITS: 100 INJECTION, SOLUTION INTRAVENOUS; SUBCUTANEOUS at 09:05

## 2023-05-20 RX ADMIN — INSULIN DETEMIR 20 UNITS: 100 INJECTION, SOLUTION SUBCUTANEOUS at 08:05

## 2023-05-20 RX ADMIN — INSULIN ASPART 2 UNITS: 100 INJECTION, SOLUTION INTRAVENOUS; SUBCUTANEOUS at 08:05

## 2023-05-20 RX ADMIN — FERROUS SULFATE TAB 325 MG (65 MG ELEMENTAL FE) 1 EACH: 325 (65 FE) TAB at 09:05

## 2023-05-20 RX ADMIN — SODIUM CHLORIDE, PRESERVATIVE FREE 10 ML: 5 INJECTION INTRAVENOUS at 05:05

## 2023-05-20 RX ADMIN — Medication 1 CAPSULE: at 05:05

## 2023-05-20 RX ADMIN — SITAGLIPTIN 100 MG: 50 TABLET, FILM COATED ORAL at 09:05

## 2023-05-20 RX ADMIN — ESCITALOPRAM 5 MG: 5 TABLET, FILM COATED ORAL at 09:05
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
## 2023-05-20 NOTE — NURSING
Pt was taken outside with cargiver and family with instructions to be back in bed at 3pm.  Returned at 3:30pm

## 2023-05-20 NOTE — PROGRESS NOTES
Name: Antonio Galvan II    : 1965 (57 y.o.)  MRN: [id number]           Patient Unavailable      Patient unable to be seen at this time secondary to: pt soundly asleep upon entry. Patient's caregiver/family member present and reports she ranged him already and requesting to let him rest.

## 2023-05-20 NOTE — PLAN OF CARE
Problem: Adult Inpatient Plan of Care  Goal: Plan of Care Review  Outcome: Ongoing, Progressing  Flowsheets (Taken 5/20/2023 1041)  Plan of Care Reviewed With:   patient   caregiver  Goal: Absence of Hospital-Acquired Illness or Injury  Outcome: Ongoing, Progressing  Intervention: Identify and Manage Fall Risk  Flowsheets (Taken 5/20/2023 1041)  Safety Promotion/Fall Prevention:   assistive device/personal item within reach   bed alarm set   instructed to call staff for mobility   room near unit station   lighting adjusted  Intervention: Prevent Skin Injury  Flowsheets (Taken 5/20/2023 1041)  Body Position:   30 degrees   sitting up in bed   weight shifting  Skin Protection:   adhesive use limited   tubing/devices free from skin contact   protective footwear used   incontinence pads utilized  Intervention: Prevent and Manage VTE (Venous Thromboembolism) Risk  Flowsheets (Taken 5/20/2023 1041)  Activity Management:   Rolling - L1   Patient unable to perform activities  VTE Prevention/Management: fluids promoted  Range of Motion: ROM (range of motion) performed  Intervention: Prevent Infection  Flowsheets (Taken 5/20/2023 1041)  Infection Prevention:   environmental surveillance performed   equipment surfaces disinfected   hand hygiene promoted   personal protective equipment utilized   rest/sleep promoted  Goal: Optimal Comfort and Wellbeing  Outcome: Ongoing, Progressing  Intervention: Monitor Pain and Promote Comfort  Flowsheets (Taken 5/20/2023 1041)  Pain Management Interventions:   position adjusted   pillow support provided   quiet environment facilitated   diversional activity provided   medication offered  Intervention: Provide Person-Centered Care  Flowsheets (Taken 5/20/2023 1041)  Trust Relationship/Rapport:   care explained   emotional support provided   empathic listening provided   questions answered   reassurance provided   thoughts/feelings acknowledged     Problem: Diabetes Comorbidity  Goal: Blood  Glucose Level Within Targeted Range  Outcome: Ongoing, Progressing  Intervention: Monitor and Manage Glycemia  Flowsheets (Taken 5/20/2023 1041)  Glycemic Management:   blood glucose monitored   supplemental insulin given     Problem: Adjustment to Illness (Sepsis/Septic Shock)  Goal: Optimal Coping  Outcome: Ongoing, Progressing  Intervention: Optimize Psychosocial Adjustment to Illness  Flowsheets (Taken 5/20/2023 1041)  Supportive Measures:   active listening utilized   positive reinforcement provided   decision-making supported     Problem: Infection  Goal: Absence of Infection Signs and Symptoms  Outcome: Ongoing, Progressing     Problem: Impaired Wound Healing  Goal: Optimal Wound Healing  Outcome: Ongoing, Progressing     Problem: Skin Injury Risk Increased  Goal: Skin Health and Integrity  Outcome: Ongoing, Progressing  Intervention: Optimize Skin Protection  Flowsheets (Taken 5/20/2023 1041)  Pressure Reduction Techniques:   frequent weight shift encouraged   heels elevated off bed   positioned off wounds   sit time limited to 2 hours  Pressure Reduction Devices:   specialty bed utilized   positioning supports utilized   elbow protectors utilized   foam padding utilized  Skin Protection:   adhesive use limited   tubing/devices free from skin contact   protective footwear used   incontinence pads utilized  Head of Bed (HOB) Positioning: HOB at 30-45 degrees

## 2023-05-20 NOTE — PLAN OF CARE
[facility] - [facility]  Discharge Final Note    Primary Care Provider: Jennifer Fernando NP    Expected Discharge Date: 5/12/2023    Final Discharge Note (most recent)       Final Note - 05/20/23 1857          Final Note    Assessment Type Final Discharge Note     Anticipated Discharge Disposition Swing Bed with Planned Readmission     What phone number can be called within the next 1-3 days to see how you are doing after discharge? [phone]     Hospital Resources/Appts/Education Provided Provided patient/caregiver with written discharge plan information        Post-Acute Status    Discharge Delays None known at this time                     Important Message from Medicare

## 2023-05-20 NOTE — PLAN OF CARE
Problem: Adult Inpatient Plan of Care  Goal: Patient-Specific Goal (Individualized)  Outcome: Ongoing, Progressing  Flowsheets (Taken 5/19/2023 2204)  Individualized Care Needs: Assist patient to turn q2h during shift     Problem: Diabetes Comorbidity  Goal: Blood Glucose Level Within Targeted Range  Outcome: Ongoing, Progressing  Intervention: Monitor and Manage Glycemia  Flowsheets (Taken 5/19/2023 2204)  Glycemic Management:   blood glucose monitored   supplemental insulin given     Problem: Infection Progression (Sepsis/Septic Shock)  Goal: Absence of Infection Signs and Symptoms  Outcome: Ongoing, Progressing  Intervention: Initiate Sepsis Management  Flowsheets (Taken 5/19/2023 2204)  Infection Prevention:   equipment surfaces disinfected   rest/sleep promoted   personal protective equipment utilized  Stabilization Measures: airway opened  Isolation Precautions: contact  Intervention: Promote Stabilization  Flowsheets (Taken 5/19/2023 2204)  Fever Reduction/Comfort Measures:   lightweight clothing   lightweight bedding  Fluid/Electrolyte Management: fluids provided  Intervention: Promote Recovery  Flowsheets (Taken 5/19/2023 2204)  Sleep/Rest Enhancement: relaxation techniques promoted  Activity Management: Walk with assistive devise and /or staff member - L3

## 2023-05-21 LAB
BASOPHILS # BLD AUTO: 0.01 X10(3)/MCL
BASOPHILS NFR BLD AUTO: 0.1 %
EOSINOPHIL # BLD AUTO: 0.15 X10(3)/MCL (ref 0–0.9)
EOSINOPHIL NFR BLD AUTO: 1.9 %
ERYTHROCYTE [DISTWIDTH] IN BLOOD BY AUTOMATED COUNT: 28.3 % (ref 11.5–17)
HCT VFR BLD AUTO: 33.1 % (ref 42–52)
HGB BLD-MCNC: 9.6 G/DL (ref 14–18)
LYMPHOCYTES # BLD AUTO: 2.5 X10(3)/MCL (ref 0.6–4.6)
LYMPHOCYTES NFR BLD AUTO: 31.4 %
MCH RBC QN AUTO: 22.4 PG (ref 27–31)
MCHC RBC AUTO-ENTMCNC: 29 G/DL (ref 33–36)
MCV RBC AUTO: 77.3 FL (ref 80–94)
MONOCYTES # BLD AUTO: 0.55 X10(3)/MCL (ref 0.1–1.3)
MONOCYTES NFR BLD AUTO: 6.9 %
NEUTROPHILS # BLD AUTO: 4.71 X10(3)/MCL (ref 2.1–9.2)
NEUTROPHILS NFR BLD AUTO: 59.2 %
PLATELET # BLD AUTO: 268 X10(3)/MCL (ref 130–400)
PMV BLD AUTO: 10.4 FL (ref 7.4–10.4)
POCT GLUCOSE: 134 MG/DL (ref 70–110)
POCT GLUCOSE: 138 MG/DL (ref 70–110)
POCT GLUCOSE: 141 MG/DL (ref 70–110)
POCT GLUCOSE: 184 MG/DL (ref 70–110)
RBC # BLD AUTO: 4.28 X10(6)/MCL (ref 4.7–6.1)
WBC # SPEC AUTO: 7.96 X10(3)/MCL (ref 4.5–11.5)

## 2023-05-21 PROCEDURE — 63600175 PHARM REV CODE 636 W HCPCS: Performed by: STUDENT IN AN ORGANIZED HEALTH CARE EDUCATION/TRAINING PROGRAM

## 2023-05-21 PROCEDURE — 27000207 HC ISOLATION

## 2023-05-21 PROCEDURE — 94760 N-INVAS EAR/PLS OXIMETRY 1: CPT

## 2023-05-21 PROCEDURE — A4216 STERILE WATER/SALINE, 10 ML: HCPCS | Performed by: STUDENT IN AN ORGANIZED HEALTH CARE EDUCATION/TRAINING PROGRAM

## 2023-05-21 PROCEDURE — 99900035 HC TECH TIME PER 15 MIN (STAT)

## 2023-05-21 PROCEDURE — 25000003 PHARM REV CODE 250: Performed by: STUDENT IN AN ORGANIZED HEALTH CARE EDUCATION/TRAINING PROGRAM

## 2023-05-21 PROCEDURE — 11000004 HC SNF PRIVATE

## 2023-05-21 PROCEDURE — 85025 COMPLETE CBC W/AUTO DIFF WBC: CPT | Performed by: STUDENT IN AN ORGANIZED HEALTH CARE EDUCATION/TRAINING PROGRAM

## 2023-05-21 RX ADMIN — NIFEDIPINE 60 MG: 30 TABLET, FILM COATED, EXTENDED RELEASE ORAL at 10:05

## 2023-05-21 RX ADMIN — LOPERAMIDE HYDROCHLORIDE 2 MG: 2 CAPSULE ORAL at 10:05

## 2023-05-21 RX ADMIN — SITAGLIPTIN 100 MG: 50 TABLET, FILM COATED ORAL at 10:05

## 2023-05-21 RX ADMIN — INSULIN ASPART 10 UNITS: 100 INJECTION, SOLUTION INTRAVENOUS; SUBCUTANEOUS at 12:05

## 2023-05-21 RX ADMIN — CEFTRIAXONE SODIUM 1 G: 1 INJECTION, POWDER, FOR SOLUTION INTRAMUSCULAR; INTRAVENOUS at 12:05

## 2023-05-21 RX ADMIN — MELATONIN TAB 3 MG 9 MG: 3 TAB at 09:05

## 2023-05-21 RX ADMIN — COLLAGENASE SANTYL: 250 OINTMENT TOPICAL at 09:05

## 2023-05-21 RX ADMIN — HYDROCODONE BITARTRATE AND ACETAMINOPHEN 1 TABLET: 5; 325 TABLET ORAL at 03:05

## 2023-05-21 RX ADMIN — INSULIN DETEMIR 20 UNITS: 100 INJECTION, SOLUTION SUBCUTANEOUS at 09:05

## 2023-05-21 RX ADMIN — ESCITALOPRAM 5 MG: 5 TABLET, FILM COATED ORAL at 10:05

## 2023-05-21 RX ADMIN — FERROUS SULFATE TAB 325 MG (65 MG ELEMENTAL FE) 1 EACH: 325 (65 FE) TAB at 10:05

## 2023-05-21 RX ADMIN — INSULIN ASPART 10 UNITS: 100 INJECTION, SOLUTION INTRAVENOUS; SUBCUTANEOUS at 05:05

## 2023-05-21 RX ADMIN — SODIUM CHLORIDE, PRESERVATIVE FREE 10 ML: 5 INJECTION INTRAVENOUS at 05:05

## 2023-05-21 RX ADMIN — HYDROCODONE BITARTRATE AND ACETAMINOPHEN 1 TABLET: 5; 325 TABLET ORAL at 10:05

## 2023-05-21 RX ADMIN — FOLIC ACID 2 MG: 1 TABLET ORAL at 10:05

## 2023-05-21 RX ADMIN — SODIUM CHLORIDE, PRESERVATIVE FREE 10 ML: 5 INJECTION INTRAVENOUS at 12:05

## 2023-05-21 RX ADMIN — CLOBETASOL PROPIONATE: 0.5 CREAM TOPICAL at 10:05

## 2023-05-21 RX ADMIN — ENOXAPARIN SODIUM 100 MG: 100 INJECTION SUBCUTANEOUS at 03:05

## 2023-05-21 RX ADMIN — Medication 1 CAPSULE: at 12:05

## 2023-05-21 RX ADMIN — CARVEDILOL 25 MG: 12.5 TABLET, FILM COATED ORAL at 10:05

## 2023-05-21 RX ADMIN — ENOXAPARIN SODIUM 100 MG: 100 INJECTION SUBCUTANEOUS at 02:05

## 2023-05-21 RX ADMIN — Medication 1 CAPSULE: at 10:05

## 2023-05-21 RX ADMIN — INSULIN ASPART 10 UNITS: 100 INJECTION, SOLUTION INTRAVENOUS; SUBCUTANEOUS at 10:05

## 2023-05-21 RX ADMIN — Medication 1 CAPSULE: at 05:05

## 2023-05-21 NOTE — PLAN OF CARE
[facility] - [facility]  Discharge Reassessment    Primary Care Provider: Jennifer Fernando NP    Expected Discharge Date:     Reassessment (most recent)       Discharge Reassessment - 05/21/23 1731          Discharge Reassessment    Assessment Type Discharge Planning Reassessment     Did the patient's condition or plan change since previous assessment? No     Discharge Plan discussed with: Patient     Name(s) and Number(s) Judy mother      Communicated SADE with patient/caregiver Date not available/Unable to determine     Discharge Plan A Home with family;Home Health     DME Needed Upon Discharge  none     Transition of Care Barriers None     Why the patient remains in the hospital Requires continued medical care        Post-Acute Status    Post-Acute Authorization Home Health     Home Health Status Pending medical clearance/testing     Hospital Resources/Appts/Education Provided Provided patient/caregiver with written discharge plan information     Discharge Delays None known at this time

## 2023-05-21 NOTE — PLAN OF CARE
Problem: Adult Inpatient Plan of Care  Goal: Plan of Care Review  Outcome: Ongoing, Progressing  Goal: Patient-Specific Goal (Individualized)  Outcome: Ongoing, Progressing  Flowsheets (Taken 5/21/2023 1756)  Anxieties, Fears or Concerns: wound healing  Individualized Care Needs: abx therapy  Goal: Absence of Hospital-Acquired Illness or Injury  Outcome: Ongoing, Progressing  Intervention: Identify and Manage Fall Risk  Flowsheets (Taken 5/21/2023 1756)  Safety Promotion/Fall Prevention:   assistive device/personal item within reach   bed alarm set   nonskid shoes/socks when out of bed   side rails raised x 2  Intervention: Prevent Skin Injury  Flowsheets (Taken 5/21/2023 1756)  Body Position: supine  Skin Protection:   adhesive use limited   incontinence pads utilized   tubing/devices free from skin contact   skin sealant/moisture barrier applied  Goal: Optimal Comfort and Wellbeing  Outcome: Ongoing, Progressing  Goal: Readiness for Transition of Care  Outcome: Ongoing, Progressing     Problem: Diabetes Comorbidity  Goal: Blood Glucose Level Within Targeted Range  Outcome: Ongoing, Progressing  Intervention: Monitor and Manage Glycemia  Flowsheets (Taken 5/21/2023 1756)  Glycemic Management: blood glucose monitored     Problem: Adjustment to Illness (Sepsis/Septic Shock)  Goal: Optimal Coping  Outcome: Ongoing, Progressing     Problem: Bleeding (Sepsis/Septic Shock)  Goal: Absence of Bleeding  Outcome: Ongoing, Progressing     Problem: Glycemic Control Impaired (Sepsis/Septic Shock)  Goal: Blood Glucose Level Within Desired Range  Outcome: Ongoing, Progressing  Intervention: Optimize Glycemic Control  Flowsheets (Taken 5/21/2023 1756)  Glycemic Management: blood glucose monitored     Problem: Infection Progression (Sepsis/Septic Shock)  Goal: Absence of Infection Signs and Symptoms  Outcome: Ongoing, Progressing     Problem: Nutrition Impaired (Sepsis/Septic Shock)  Goal: Optimal Nutrition Intake  Outcome:  Ongoing, Progressing     Problem: Infection  Goal: Absence of Infection Signs and Symptoms  Outcome: Ongoing, Progressing     Problem: Impaired Wound Healing  Goal: Optimal Wound Healing  Outcome: Ongoing, Progressing  Intervention: Promote Wound Healing  Flowsheets (Taken 5/21/2023 1756)  Oral Nutrition Promotion: calorie-dense foods provided  Activity Management: Rolling - L1  Pain Management Interventions: medication offered     Problem: Skin Injury Risk Increased  Goal: Skin Health and Integrity  Outcome: Ongoing, Progressing  Intervention: Optimize Skin Protection  Flowsheets (Taken 5/21/2023 1756)  Pressure Reduction Techniques: frequent weight shift encouraged  Skin Protection:   adhesive use limited   incontinence pads utilized   tubing/devices free from skin contact   skin sealant/moisture barrier applied  Head of Bed (HOB) Positioning: HOB at 30 degrees     Problem: Fall Injury Risk  Goal: Absence of Fall and Fall-Related Injury  Outcome: Ongoing, Progressing  Intervention: Identify and Manage Contributors  Flowsheets (Taken 5/21/2023 1756)  Self-Care Promotion: safe use of adaptive equipment encouraged  Medication Review/Management: medications reviewed

## 2023-05-21 NOTE — PLAN OF CARE
Problem: Adult Inpatient Plan of Care  Goal: Patient-Specific Goal (Individualized)  Outcome: Ongoing, Progressing  Flowsheets (Taken 5/20/2023 2053)  Individualized Care Needs: turn patient every two hours with intergrity during shift     Problem: Diabetes Comorbidity  Goal: Blood Glucose Level Within Targeted Range  Outcome: Ongoing, Progressing  Intervention: Monitor and Manage Glycemia  Flowsheets (Taken 5/20/2023 2053)  Glycemic Management:   blood glucose monitored   carbohydrate replacement provided   supplemental insulin given     Problem: Adjustment to Illness (Sepsis/Septic Shock)  Goal: Optimal Coping  Outcome: Ongoing, Progressing  Intervention: Optimize Psychosocial Adjustment to Illness  Flowsheets (Taken 5/20/2023 2053)  Supportive Measures:   active listening utilized   decision-making supported   relaxation techniques promoted   self-care encouraged   positive reinforcement provided  Family/Support System Care:   self-care encouraged   support provided     Problem: Glycemic Control Impaired (Sepsis/Septic Shock)  Goal: Blood Glucose Level Within Desired Range  Outcome: Ongoing, Progressing  Intervention: Optimize Glycemic Control  Flowsheets (Taken 5/20/2023 2053)  Glycemic Management:   blood glucose monitored   carbohydrate replacement provided   supplemental insulin given     Problem: Fall Injury Risk  Goal: Absence of Fall and Fall-Related Injury  Outcome: Ongoing, Progressing  Intervention: Identify and Manage Contributors  Flowsheets (Taken 5/20/2023 2053)  Self-Care Promotion:   independence encouraged   safe use of adaptive equipment encouraged  Medication Review/Management:   medications reviewed   high-risk medications identified  Intervention: Promote Injury-Free Environment  Flowsheets (Taken 5/20/2023 2053)  Safety Promotion/Fall Prevention:   assistive device/personal item within reach   bed alarm set   instructed to call staff for mobility
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
## 2023-05-22 LAB
POCT GLUCOSE: 137 MG/DL (ref 70–110)
POCT GLUCOSE: 156 MG/DL (ref 70–110)
POCT GLUCOSE: 184 MG/DL (ref 70–110)
POCT GLUCOSE: 97 MG/DL (ref 70–110)

## 2023-05-22 PROCEDURE — 27000207 HC ISOLATION

## 2023-05-22 PROCEDURE — 97606 NEG PRS WND THER DME>50 SQCM: CPT

## 2023-05-22 PROCEDURE — C1751 CATH, INF, PER/CENT/MIDLINE: HCPCS

## 2023-05-22 PROCEDURE — 76937 US GUIDE VASCULAR ACCESS: CPT

## 2023-05-22 PROCEDURE — 94760 N-INVAS EAR/PLS OXIMETRY 1: CPT

## 2023-05-22 PROCEDURE — A4216 STERILE WATER/SALINE, 10 ML: HCPCS | Performed by: STUDENT IN AN ORGANIZED HEALTH CARE EDUCATION/TRAINING PROGRAM

## 2023-05-22 PROCEDURE — 11000004 HC SNF PRIVATE

## 2023-05-22 PROCEDURE — 97530 THERAPEUTIC ACTIVITIES: CPT

## 2023-05-22 PROCEDURE — 63600175 PHARM REV CODE 636 W HCPCS: Performed by: STUDENT IN AN ORGANIZED HEALTH CARE EDUCATION/TRAINING PROGRAM

## 2023-05-22 PROCEDURE — 36410 VNPNXR 3YR/> PHY/QHP DX/THER: CPT

## 2023-05-22 PROCEDURE — 25000003 PHARM REV CODE 250: Performed by: STUDENT IN AN ORGANIZED HEALTH CARE EDUCATION/TRAINING PROGRAM

## 2023-05-22 PROCEDURE — 99900035 HC TECH TIME PER 15 MIN (STAT)

## 2023-05-22 PROCEDURE — 36569 INSJ PICC 5 YR+ W/O IMAGING: CPT

## 2023-05-22 RX ORDER — SODIUM CHLORIDE 0.9 % (FLUSH) 0.9 %
10 SYRINGE (ML) INJECTION
Status: DISCONTINUED | OUTPATIENT
Start: 2023-05-22 | End: 2023-05-23

## 2023-05-22 RX ORDER — SODIUM CHLORIDE 0.9 % (FLUSH) 0.9 %
10 SYRINGE (ML) INJECTION EVERY 6 HOURS
Status: DISCONTINUED | OUTPATIENT
Start: 2023-05-22 | End: 2023-05-25 | Stop reason: HOSPADM

## 2023-05-22 RX ADMIN — Medication 1 CAPSULE: at 09:05

## 2023-05-22 RX ADMIN — FERROUS SULFATE TAB 325 MG (65 MG ELEMENTAL FE) 1 EACH: 325 (65 FE) TAB at 09:05

## 2023-05-22 RX ADMIN — Medication 1 CAPSULE: at 04:05

## 2023-05-22 RX ADMIN — CEFTRIAXONE SODIUM 1 G: 1 INJECTION, POWDER, FOR SOLUTION INTRAMUSCULAR; INTRAVENOUS at 04:05

## 2023-05-22 RX ADMIN — NIFEDIPINE 60 MG: 30 TABLET, FILM COATED, EXTENDED RELEASE ORAL at 09:05

## 2023-05-22 RX ADMIN — INSULIN ASPART 10 UNITS: 100 INJECTION, SOLUTION INTRAVENOUS; SUBCUTANEOUS at 12:05

## 2023-05-22 RX ADMIN — ENOXAPARIN SODIUM 100 MG: 100 INJECTION SUBCUTANEOUS at 04:05

## 2023-05-22 RX ADMIN — Medication 1 CAPSULE: at 12:05

## 2023-05-22 RX ADMIN — HYDROCODONE BITARTRATE AND ACETAMINOPHEN 1 TABLET: 5; 325 TABLET ORAL at 09:05

## 2023-05-22 RX ADMIN — COLLAGENASE SANTYL: 250 OINTMENT TOPICAL at 09:05

## 2023-05-22 RX ADMIN — SODIUM CHLORIDE, PRESERVATIVE FREE 10 ML: 5 INJECTION INTRAVENOUS at 12:05

## 2023-05-22 RX ADMIN — SODIUM CHLORIDE, PRESERVATIVE FREE 10 ML: 5 INJECTION INTRAVENOUS at 05:05

## 2023-05-22 RX ADMIN — ENOXAPARIN SODIUM 100 MG: 100 INJECTION SUBCUTANEOUS at 02:05

## 2023-05-22 RX ADMIN — SODIUM CHLORIDE, PRESERVATIVE FREE 10 ML: 5 INJECTION INTRAVENOUS at 06:05

## 2023-05-22 RX ADMIN — FOLIC ACID 2 MG: 1 TABLET ORAL at 09:05

## 2023-05-22 RX ADMIN — LOPERAMIDE HYDROCHLORIDE 2 MG: 2 CAPSULE ORAL at 09:05

## 2023-05-22 RX ADMIN — INSULIN ASPART 4 UNITS: 100 INJECTION, SOLUTION INTRAVENOUS; SUBCUTANEOUS at 05:05

## 2023-05-22 RX ADMIN — CARVEDILOL 25 MG: 12.5 TABLET, FILM COATED ORAL at 09:05

## 2023-05-22 RX ADMIN — SITAGLIPTIN 100 MG: 50 TABLET, FILM COATED ORAL at 09:05

## 2023-05-22 RX ADMIN — HYDROCODONE BITARTRATE AND ACETAMINOPHEN 1 TABLET: 5; 325 TABLET ORAL at 04:05

## 2023-05-22 RX ADMIN — INSULIN ASPART 10 UNITS: 100 INJECTION, SOLUTION INTRAVENOUS; SUBCUTANEOUS at 04:05

## 2023-05-22 RX ADMIN — INSULIN ASPART 10 UNITS: 100 INJECTION, SOLUTION INTRAVENOUS; SUBCUTANEOUS at 09:05

## 2023-05-22 RX ADMIN — ESCITALOPRAM 5 MG: 5 TABLET, FILM COATED ORAL at 09:05

## 2023-05-22 NOTE — PROGRESS NOTES
Pt going through extensive wound care/changing of wound vac at tx attempt. Nursing reported they will be a while. PT to check later if schedule allows.
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
## 2023-05-22 NOTE — PLAN OF CARE
Problem: Adult Inpatient Plan of Care  Goal: Patient-Specific Goal (Individualized)  Outcome: Ongoing, Progressing  Flowsheets (Taken 5/21/2023 2238)  Individualized Care Needs: Have patient verbalize any time he is in pain to determine proper pain managment intervention during shift     Problem: Diabetes Comorbidity  Goal: Blood Glucose Level Within Targeted Range  Outcome: Ongoing, Progressing  Intervention: Monitor and Manage Glycemia  Flowsheets (Taken 5/21/2023 2238)  Glycemic Management:   blood glucose monitored   supplemental insulin given   carbohydrate replacement provided     Problem: Fall Injury Risk  Goal: Absence of Fall and Fall-Related Injury  Outcome: Ongoing, Progressing  Intervention: Identify and Manage Contributors  Flowsheets (Taken 5/21/2023 2238)  Self-Care Promotion:   independence encouraged   safe use of adaptive equipment encouraged  Medication Review/Management:   medications reviewed   high-risk medications identified  Intervention: Promote Injury-Free Environment  Flowsheets (Taken 5/21/2023 2238)  Safety Promotion/Fall Prevention:   assistive device/personal item within reach   bed alarm set   instructed to call staff for mobility   family to remain at bedside

## 2023-05-22 NOTE — PROCEDURES
"Antonio Galvan II is a 57 y.o. male patient.    Temp: 97.8 °F (36.6 °C) (05/22/23 1520)  Pulse: 71 (05/22/23 1520)  Resp: 18 (05/22/23 0949)  BP: (!) 92/56 (05/22/23 1520)  SpO2: 95 % (05/22/23 1520)  Weight: 100.7 kg (222 lb) (05/12/23 1500)  Height: 5' 8" (172.7 cm) (05/12/23 1500)    PICC  Time out: Immediately prior to procedure a time out was called to verify the correct patient, procedure, equipment, support staff and site/side marked as required  Indications: med administration  Preparation: skin prepped with ChloraPrep  Skin prep agent dried: skin prep agent completely dried prior to procedure  Sterile barriers: all five maximum sterile barriers used - cap, mask, sterile gown, sterile gloves, and large sterile sheet  Hand hygiene: hand hygiene performed prior to central venous catheter insertion  Location details: left basilic  Catheter type: single lumen  Catheter size: 4 Fr  Catheter Length: 13cm    Ultrasound guidance: yes  Needle advanced into vessel with real time Ultrasound guidance.  Guidewire confirmed in vessel.  Sterile sheath used.          Name parmjit maranda   5/22/2023    "
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
## 2023-05-22 NOTE — PROGRESS NOTES
Assessment performed.   R anterior ankle ulceration closed today.   Some evidence of periwound bleeding to on R heel, dried blood blister noted.  Open area continues to decrease in sidePt reports being on a new blood thinner,  Periwound skin prep applied and allowed to dry.  No recommended change in regimen.  Reinforced importance of offloading heel while in chair.   L ischial ulcer closed today.  Recommend leaving open to air.  L trochantar, R ischium continue to improve with NPWT.  Recommend continuing twice weekly dressing changes with NPWT.  Reinforced healing principles, pressure prevention, infection control principles.  Pt and caregiver verbalized understanding.      05/22/23 1144   WOCN Assessment   WOCN Total Time (mins) 120   Visit Date 05/22/23   Consult Type Follow Up   WOCN Speciality Wound   WOCN List wound vac   Wound pressure   Continence Type Fecal   Ostomy Type   (suprapubic catheter)   Procedure wound vac   Intervention assessed;changed;orders   Teaching on-going   Skin Interventions   Device Skin Pressure Protection absorbent pad utilized/changed;adhesive use limited;pressure points protected   Pressure Reduction Devices specialty bed utilized;positioning supports utilized;heel offloading device utilized;elbow protectors utilized   Pressure Reduction Techniques weight shift assistance provided;pressure points protected;positioned off wounds;frequent weight shift encouraged   Skin Protection adhesive use limited;tubing/devices free from skin contact   Pressure Injury Prevention    Check Moisture Management Pad Done   Heel protection technique Pillow        Altered Skin Integrity 05/06/22 1140 Right Heel  Full thickness tissue loss. Subcutaneous fat may be visible but bone, tendon or muscle are not exposed   Date First Assessed/Time First Assessed: 05/06/22 1140   Altered Skin Integrity Present on Admission: yes  Side: Right  Location: Heel  Is this injury device related?: No  Primary Wound Type:  (c)   Description of Altered Skin Integrity: Full thickness...   Wound Image    Description of Altered Skin Integrity Full thickness tissue loss. Subcutaneous fat may be visible but bone, tendon or muscle are not exposed   Dressing Appearance Intact;Moist drainage   Drainage Amount Small   Drainage Characteristics/Odor Sanguineous;No odor;Bleeding controlled   Appearance Red;Granulating;Ecchymotic;Bleeding   Tissue loss description Full thickness   Periwound Area Ecchymotic;Scar tissue;Other (see comments)  (dried blood blister)   Wound Edges Irregular   Wound Length (cm) 3.5 cm   Wound Width (cm) 2.2 cm   Wound Depth (cm) 0.1 cm   Wound Volume (cm^3) 0.77 cm^3   Wound Surface Area (cm^2) 7.7 cm^2   Care Cleansed with:;Antimicrobial agent   Dressing Applied;Hydrofiber;Silver;Gauze;Non-adherent;Rolled gauze;Other (comment)  (medfix)   Dressing Change Due 05/23/23        Altered Skin Integrity 02/09/23 1324 Right anterior Ankle Ulceration Full thickness tissue loss. Subcutaneous fat may be visible but bone, tendon or muscle are not exposed   Date First Assessed/Time First Assessed: 02/09/23 1324   Altered Skin Integrity Present on Admission: yes  Side: Right  Orientation: anterior  Location: Ankle  Primary Wound Type: Ulceration  Description of Altered Skin Integrity: Full thickness tissu...   Wound Image    Dressing Appearance Dry;Intact;Clean   Drainage Amount None   Appearance Closed/resurfaced;Dry   Periwound Area Intact;Hemosiderin Staining   Wound Length (cm) 0 cm   Wound Width (cm) 0 cm   Wound Depth (cm) 0 cm   Wound Volume (cm^3) 0 cm^3   Wound Surface Area (cm^2) 0 cm^2   Care Cleansed with:;Antimicrobial agent   Dressing Applied;Gauze;Rolled gauze   Dressing Change Due 05/25/23        Altered Skin Integrity 05/08/23 1639 Left Ischial tuberosity Partial thickness tissue loss. Shallow open ulcer with a red or pink wound bed, without slough. Intact or Open/Ruptured Serum-filled blister.   Date First  Assessed/Time First Assessed: 05/08/23 1639   Altered Skin Integrity Present on Admission - Did Patient arrive to the hospital with altered skin?: yes  Side: Left  Location: Ischial tuberosity  Is this injury device related?: No  Descriptio...   Wound Image    Dressing Appearance Open to air   Drainage Amount None   Appearance Pink;Closed/resurfaced   Periwound Area Moist   Wound Length (cm) 0 cm   Wound Width (cm) 0 cm   Wound Depth (cm) 0 cm   Wound Volume (cm^3) 0 cm^3   Wound Surface Area (cm^2) 0 cm^2        Altered Skin Integrity 01/12/23 1530 Sacral spine Full thickness tissue loss with exposed bone, tendon, or muscle. Often includes undermining and tunneling. May extend into muscle and/or supporting structures.   Date First Assessed/Time First Assessed: 01/12/23 1530   Altered Skin Integrity Present on Admission - Did Patient arrive to the hospital with altered skin?: yes  Location: Sacral spine  Description of Altered Skin Integrity: Full thickness tissue los...   Wound Image    Description of Altered Skin Integrity Full thickness tissue loss. Subcutaneous fat may be visible but bone, tendon or muscle are not exposed   Dressing Appearance Intact;Moist drainage   Drainage Amount Small   Drainage Characteristics/Odor Serosanguineous;No odor;Bleeding controlled   Appearance Red;Granulating;White;Fibrin;Slough   Tissue loss description Full thickness   Red (%), Wound Tissue Color 85 %   Periwound Area Moist;Scar tissue   Wound Edges Irregular;Jagged   Wound Length (cm) 4.2 cm   Wound Width (cm) 0.7 cm   Wound Depth (cm) 0.4 cm   Wound Volume (cm^3) 1.176 cm^3   Wound Surface Area (cm^2) 2.94 cm^2   Care Antimicrobial agent   Dressing Applied;Sodium chloride impregnated;Gauze;Non-adherent  (santyl, medfix)   Periwound Care Skin barrier film applied   Dressing Change Due 05/23/23        Altered Skin Integrity 01/12/23 1532 Right Ischial tuberosity Full thickness tissue loss with exposed bone, tendon, or muscle.  Often includes undermining and tunneling. May extend into muscle and/or supporting structures.   Date First Assessed/Time First Assessed: 01/12/23 1532   Altered Skin Integrity Present on Admission - Did Patient arrive to the hospital with altered skin?: yes  Side: Right  Location: Ischial tuberosity  Description of Altered Skin Integrity: Full t...   Wound Image    Description of Altered Skin Integrity Full thickness tissue loss with exposed bone, tendon, or muscle. Often includes undermining and tunneling. May extend into muscle and/or supporting structures.   Dressing Appearance Intact;Moist drainage   Drainage Amount Moderate   Drainage Characteristics/Odor Serosanguineous;No odor;Bleeding controlled   Appearance Red;Granulating;Yellow;Slough;Muscle;Adipose   Tissue loss description Full thickness   Red (%), Wound Tissue Color 65 %   Periwound Area Intact;Moist   Wound Edges Irregular   Wound Length (cm) 11 cm   Wound Width (cm) 15 cm   Wound Depth (cm) 3.5 cm   Wound Volume (cm^3) 577.5 cm^3   Wound Surface Area (cm^2) 165 cm^2   Care Cleansed with:;Antimicrobial agent   Dressing Applied;Other (comment)  (santyl, black granufoam, drape)   Periwound Care Hydrocolloid barrier applied;Skin barrier film applied   Dressing Change Due 05/25/23        Altered Skin Integrity Left Greater trochanter Full thickness tissue loss. Base is covered by slough and/or eschar in the wound bed   No Date First Assessed or Time First Assessed found.   Altered Skin Integrity Present on Admission - Did Patient arrive to the hospital with altered skin?: yes  Side: Left  Location: Greater trochanter  Description of Altered Skin Integrity: Full thic...   Wound Image    Description of Altered Skin Integrity Full thickness tissue loss with exposed bone, tendon, or muscle. Often includes undermining and tunneling. May extend into muscle and/or supporting structures.   Dressing Appearance Intact;Moist drainage   Drainage Amount Small    Drainage Characteristics/Odor No odor;Bleeding controlled   Appearance Red;Granulating;White;Yellow;Slough;Other (see comments)  (periosteium)   Tissue loss description Full thickness   Periwound Area Intact   Wound Edges Defined   Wound Length (cm) 3.5 cm   Wound Width (cm) 5.5 cm   Wound Depth (cm) 1.3 cm   Wound Volume (cm^3) 25.025 cm^3   Wound Surface Area (cm^2) 19.25 cm^2   Undermining (depth (cm)/location) 8 o'clock to 11 o'clock.4cm at 9 o'clock   Care Cleansed with:;Antimicrobial agent   Dressing Applied;Other (comment)  (santyl, black granufoam/drape)   Dressing Change Due 05/25/23        Negative Pressure Wound Therapy  03/20/23 1500 Right   Placement Date/Time: 03/20/23 1500   Side: Right  Location: Ischial tuberosity   NPWT Type Vacuum Therapy   Pressure Setting NPWT 125 mmHg   Therapy Interventions NPWT Dressing changed;Seal intact;Y connector   Sponges Inserted NPWT Black;2   Sponges Removed NPWT Black;2        Negative Pressure Wound Therapy  05/15/23 1344 Left   Placement Date/Time: 05/15/23 1344   Side: Left  Location: Hip   NPWT Type Vacuum Therapy   Therapy Setting NPWT Continuous therapy   Pressure Setting NPWT 125 mmHg   Therapy Interventions NPWT Dressing changed;Y connector;Seal intact   Sponges Inserted NPWT Black;1   Sponges Removed NPWT Black;1

## 2023-05-22 NOTE — PROCEDURES
"Antonio Galvan II is a 57 y.o. male patient.    Temp: 98 °F (36.7 °C) (05/22/23 0700)  Pulse: 75 (05/22/23 0700)  Resp: 18 (05/22/23 0949)  BP: 111/76 (05/22/23 0950)  SpO2: 97 % (05/22/23 0700)  Weight: 100.7 kg (222 lb) (05/12/23 1500)  Height: 5' 8" (172.7 cm) (05/12/23 1500)    PICC  Time out: Immediately prior to procedure a time out was called to verify the correct patient, procedure, equipment, support staff and site/side marked as required  Indications: med administration  Preparation: skin prepped with ChloraPrep  Skin prep agent dried: skin prep agent completely dried prior to procedure  Sterile barriers: all five maximum sterile barriers used - cap, mask, sterile gown, sterile gloves, and large sterile sheet  Hand hygiene: hand hygiene performed prior to central venous catheter insertion  Location details: left basilic  Catheter type: double lumen  Catheter size: 5 Fr  Catheter Length: 42cm    Ultrasound guidance: yes  Needle advanced into vessel with real time Ultrasound guidance.  Guidewire confirmed in vessel.  Sterile sheath used.          Name Phu maranda   5/22/2023    "

## 2023-05-23 LAB
POCT GLUCOSE: 149 MG/DL (ref 70–110)
POCT GLUCOSE: 184 MG/DL (ref 70–110)
POCT GLUCOSE: 215 MG/DL (ref 70–110)

## 2023-05-23 PROCEDURE — 25000003 PHARM REV CODE 250: Performed by: STUDENT IN AN ORGANIZED HEALTH CARE EDUCATION/TRAINING PROGRAM

## 2023-05-23 PROCEDURE — 99900035 HC TECH TIME PER 15 MIN (STAT)

## 2023-05-23 PROCEDURE — 11000004 HC SNF PRIVATE

## 2023-05-23 PROCEDURE — A4216 STERILE WATER/SALINE, 10 ML: HCPCS | Performed by: STUDENT IN AN ORGANIZED HEALTH CARE EDUCATION/TRAINING PROGRAM

## 2023-05-23 PROCEDURE — 63600175 PHARM REV CODE 636 W HCPCS: Performed by: STUDENT IN AN ORGANIZED HEALTH CARE EDUCATION/TRAINING PROGRAM

## 2023-05-23 PROCEDURE — 97110 THERAPEUTIC EXERCISES: CPT

## 2023-05-23 PROCEDURE — 27000207 HC ISOLATION

## 2023-05-23 PROCEDURE — 94760 N-INVAS EAR/PLS OXIMETRY 1: CPT

## 2023-05-23 RX ORDER — HYDROCODONE BITARTRATE AND ACETAMINOPHEN 5; 325 MG/1; MG/1
1 TABLET ORAL 2 TIMES DAILY PRN
Status: DISCONTINUED | OUTPATIENT
Start: 2023-05-23 | End: 2023-05-25 | Stop reason: HOSPADM

## 2023-05-23 RX ADMIN — FERROUS SULFATE TAB 325 MG (65 MG ELEMENTAL FE) 1 EACH: 325 (65 FE) TAB at 09:05

## 2023-05-23 RX ADMIN — SODIUM CHLORIDE, PRESERVATIVE FREE 10 ML: 5 INJECTION INTRAVENOUS at 12:05

## 2023-05-23 RX ADMIN — FOLIC ACID 2 MG: 1 TABLET ORAL at 09:05

## 2023-05-23 RX ADMIN — INSULIN ASPART 10 UNITS: 100 INJECTION, SOLUTION INTRAVENOUS; SUBCUTANEOUS at 05:05

## 2023-05-23 RX ADMIN — HYDROCODONE BITARTRATE AND ACETAMINOPHEN 1 TABLET: 5; 325 TABLET ORAL at 09:05

## 2023-05-23 RX ADMIN — SITAGLIPTIN 100 MG: 50 TABLET, FILM COATED ORAL at 09:05

## 2023-05-23 RX ADMIN — ENOXAPARIN SODIUM 100 MG: 100 INJECTION SUBCUTANEOUS at 04:05

## 2023-05-23 RX ADMIN — ENOXAPARIN SODIUM 100 MG: 100 INJECTION SUBCUTANEOUS at 02:05

## 2023-05-23 RX ADMIN — CARVEDILOL 25 MG: 12.5 TABLET, FILM COATED ORAL at 09:05

## 2023-05-23 RX ADMIN — CEFTRIAXONE SODIUM 1 G: 1 INJECTION, POWDER, FOR SOLUTION INTRAMUSCULAR; INTRAVENOUS at 04:05

## 2023-05-23 RX ADMIN — ESCITALOPRAM 5 MG: 5 TABLET, FILM COATED ORAL at 09:05

## 2023-05-23 RX ADMIN — INSULIN ASPART 10 UNITS: 100 INJECTION, SOLUTION INTRAVENOUS; SUBCUTANEOUS at 06:05

## 2023-05-23 RX ADMIN — COLLAGENASE SANTYL: 250 OINTMENT TOPICAL at 09:05

## 2023-05-23 RX ADMIN — Medication 1 CAPSULE: at 01:05

## 2023-05-23 RX ADMIN — Medication 1 CAPSULE: at 09:05

## 2023-05-23 RX ADMIN — SODIUM CHLORIDE, PRESERVATIVE FREE 10 ML: 5 INJECTION INTRAVENOUS at 05:05

## 2023-05-23 RX ADMIN — INSULIN ASPART 10 UNITS: 100 INJECTION, SOLUTION INTRAVENOUS; SUBCUTANEOUS at 01:05

## 2023-05-23 RX ADMIN — LOPERAMIDE HYDROCHLORIDE 2 MG: 2 CAPSULE ORAL at 09:05

## 2023-05-23 RX ADMIN — Medication 1 CAPSULE: at 05:05

## 2023-05-23 RX ADMIN — SODIUM CHLORIDE, PRESERVATIVE FREE 10 ML: 5 INJECTION INTRAVENOUS at 01:05
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
## 2023-05-23 NOTE — PLAN OF CARE
Problem: Adult Inpatient Plan of Care  Goal: Plan of Care Review  Outcome: Ongoing, Progressing  Goal: Patient-Specific Goal (Individualized)  Outcome: Ongoing, Progressing  Flowsheets (Taken 5/23/2023 1223)  Anxieties, Fears or Concerns: wounds  Individualized Care Needs: abx therapy and wound care  Goal: Absence of Hospital-Acquired Illness or Injury  Outcome: Ongoing, Progressing  Intervention: Prevent Skin Injury  Flowsheets (Taken 5/23/2023 1223)  Body Position: supine  Skin Protection:   adhesive use limited   incontinence pads utilized  Goal: Optimal Comfort and Wellbeing  Outcome: Ongoing, Progressing  Goal: Readiness for Transition of Care  Outcome: Ongoing, Progressing     Problem: Diabetes Comorbidity  Goal: Blood Glucose Level Within Targeted Range  Outcome: Ongoing, Progressing     Problem: Adjustment to Illness (Sepsis/Septic Shock)  Goal: Optimal Coping  Outcome: Ongoing, Progressing     Problem: Bleeding (Sepsis/Septic Shock)  Goal: Absence of Bleeding  Outcome: Ongoing, Progressing     Problem: Glycemic Control Impaired (Sepsis/Septic Shock)  Goal: Blood Glucose Level Within Desired Range  Outcome: Ongoing, Progressing     Problem: Infection Progression (Sepsis/Septic Shock)  Goal: Absence of Infection Signs and Symptoms  Outcome: Ongoing, Progressing  Intervention: Initiate Sepsis Management  Flowsheets (Taken 5/23/2023 1223)  Infection Prevention: hand hygiene promoted  Isolation Precautions:   contact   precautions maintained     Problem: Nutrition Impaired (Sepsis/Septic Shock)  Goal: Optimal Nutrition Intake  Outcome: Ongoing, Progressing     Problem: Infection  Goal: Absence of Infection Signs and Symptoms  Outcome: Ongoing, Progressing     Problem: Impaired Wound Healing  Goal: Optimal Wound Healing  Outcome: Ongoing, Progressing     Problem: Skin Injury Risk Increased  Goal: Skin Health and Integrity  Outcome: Ongoing, Progressing  Intervention: Optimize Skin Protection  Flowsheets (Taken  5/23/2023 1223)  Pressure Reduction Techniques:   frequent weight shift encouraged   weight shift assistance provided  Pressure Reduction Devices: specialty bed utilized  Skin Protection:   adhesive use limited   incontinence pads utilized  Head of Bed (HOB) Positioning: HOB at 30 degrees     Problem: Fall Injury Risk  Goal: Absence of Fall and Fall-Related Injury  Outcome: Ongoing, Progressing  Intervention: Identify and Manage Contributors  Flowsheets (Taken 5/23/2023 1223)  Self-Care Promotion:   BADL personal objects within reach   BADL personal routines maintained

## 2023-05-23 NOTE — PLAN OF CARE
Problem: Adult Inpatient Plan of Care  Goal: Plan of Care Review  Outcome: Ongoing, Progressing  Flowsheets (Taken 5/23/2023 0150)  Plan of Care Reviewed With: patient  Goal: Patient-Specific Goal (Individualized)  Outcome: Ongoing, Progressing  Flowsheets (Taken 5/23/2023 0150)  Anxieties, Fears or Concerns: WOUNDS  Individualized Care Needs: ABT THERAPY     Problem: Infection Progression (Sepsis/Septic Shock)  Goal: Absence of Infection Signs and Symptoms  Outcome: Ongoing, Progressing  Intervention: Initiate Sepsis Management  Flowsheets (Taken 5/23/2023 0150)  Isolation Precautions:   contact   precautions maintained

## 2023-05-23 NOTE — PROGRESS NOTES
Inpatient Nutrition Evaluation    Admit Date: 5/12/2023   Total duration of encounter: 11 days    Nutrition Recommendation/Prescription     Continue diabetic diet with popsicles and ice cream.    Nutrition Assessment     Chart Review    Reason Seen: continuous nutrition monitoring, length of stay, and follow-up    Malnutrition Screening Tool Results   Have you recently lost weight without trying?: Yes: 34 lbs or more  Have you been eating poorly because of a decreased appetite?: No   MST Score: 4     Diagnosis:   Open wound of left hip    Relevant Medical History: A-fib  Date Unknown  Cardiac arrest   Date Unknown  Chronic skin ulcer  Date Unknown  DM (diabetes mellitus)  Date Unknown  Infected decubitus ulcer  Date Unknown  Lymphedema of leg  Date Unknown  Neurogenic bladder  Date Unknown  Obesity, unspecified  Date Unknown  Pressure ulcer of heel  Date Unknown  Pressure ulcer of right foot, stage 3  Date Unknown  Pressure ulcer of right ischium  Date Unknown  Quadriplegia  Date Unknown  Quadriplegic spinal paralysis    Nutrition-Related Medications: rocephin, ferrous sulfate, folic acid, insulin aspart, insulin detemir, lactobacillus acidophilus     Nutrition-Related Labs:      Diet Order: Diet diabetic  Oral Supplement Order: none  Appetite/Oral Intake: fair/%  Factors Affecting Nutritional Intake: none identified  Food/Uatsdin/Cultural Preferences: none reported  Food Allergies: none reported    Skin Integrity: wound  Wound(s):      Altered Skin Integrity 05/06/22 1140 Right Heel  Full thickness tissue loss. Subcutaneous fat may be visible but bone, tendon or muscle are not exposed-Tissue loss description: Full thickness       Altered Skin Integrity 05/08/23 1639 Left Ischial tuberosity Partial thickness tissue loss. Shallow open ulcer with a red or pink wound bed, without slough. Intact or Open/Ruptured Serum-filled blister.-Tissue loss description: Partial thickness       Altered Skin Integrity  "01/12/23 1530 Sacral spine Full thickness tissue loss with exposed bone, tendon, or muscle. Often includes undermining and tunneling. May extend into muscle and/or supporting structures.-Tissue loss description: Full thickness       Altered Skin Integrity 01/12/23 1532 Right Ischial tuberosity Full thickness tissue loss with exposed bone, tendon, or muscle. Often includes undermining and tunneling. May extend into muscle and/or supporting structures.-Tissue loss description: Full thickness       Altered Skin Integrity Left Greater trochanter Full thickness tissue loss. Base is covered by slough and/or eschar in the wound bed-Tissue loss description: Full thickness     Comments    Pt is not in room upon meals rounds. Will try again. Current intake % of meals. 5/20/23: Preablumin 27.0 WNL.     Anthropometrics    Height: 5' 8" (172.7 cm)    Last Weight: 100.7 kg (222 lb) (05/12/23 1500) Weight Method: Bed Scale  BMI (Calculated): 33.8  BMI Classification: obese grade I (BMI 30-34.9)        Ideal Body Weight (IBW), Male: 154 lb     % Ideal Body Weight, Male (lb): 144.16 %                          Usual Weight Provided By: not applicable    Wt Readings from Last 5 Encounters:   05/12/23 100.7 kg (222 lb)   05/08/23 100.7 kg (222 lb)   02/10/23 117.9 kg (260 lb)   12/28/22 117.9 kg (260 lb)   12/01/22 117.9 kg (260 lb)     Weight Change(s) Since Admission:  Admit Weight: 100.7 kg (222 lb) (05/12/23 1500)  No new wt recorded.     Patient Education    Not applicable.    Monitoring & Evaluation     Dietitian will monitor food and beverage intake, weight, weight change, electrolyte/renal panel, glucose/endocrine profile, and gastrointestinal profile.  Nutrition Risk/Follow-Up: low (follow-up in 5-7 days)  Patients assigned 'low nutrition risk' status do not qualify for a full nutritional assessment but will be monitored and re-evaluated in a 5-7 day time period. Please consult if re-evaluation needed sooner.   "

## 2023-05-23 NOTE — PT/OT/SLP PROGRESS
Physical Therapy Treatment Note           Name: Antonio Galvan II    : 1965 (57 y.o.)  MRN: [id number]           TREATMENT SUMMARY AND RECOMMENDATIONS:    PT Received On: 23  PT Start Time: 830     PT Stop Time: 853  PT Total Time (min): 23 min     Subjective Assessment:   No complaints  Lethargic   x Awake, alert, cooperative  Uncooperative    Agitated  c/o pain    Appropriate  c/o fatigue    Confused x Treated at bedside     Emotionally labile  Treated in gym/dept.    Impulsive  Other:    Flat affect       Therapy Precautions:    Cognitive deficits  Spinal precautions    Collar - hard  Sternal precautions    Collar - soft   TLSO    Fall risk  LSO    Hip precautions - posterior  Knee immobilizer    Hip precautions - anterior  WBAT    Impaired communication  Partial weightbearing    Oxygen  TTWB    PEG tube  NWB    Visual deficits  Other:    Hearing deficits          Treatment Objectives:     Mobility Training:   Assist level Comments    Bed mobility     Transfer     Gait     Sit to stand transitions     Sitting balance     Standing balance      Wheelchair mobility     Car transfer     Other:          Therapeutic Exercise:   Exercise Sets Reps Comments   PROM to B LE and UE  20 All functional planes to end range                         Additional Comments:  Pt with new midline on L UE due to clot on R UE. No other changes. Pt tolerating well. Looking into IV med for home. Sitter in room.     Assessment: Patient tolerated session well.    PT Plan: continue per POC  Revisions made to plan of care: No    GOALS:   Multidisciplinary Problems       Physical Therapy Goals          Problem: Physical Therapy    Goal Priority Disciplines Outcome Goal Variances Interventions   Physical Therapy Goal     PT, PT/OT Ongoing, Progressing     Description: Description: Goals to be met by: Discharge      Patient will increase functional independence with mobility by performin. Pt to be able to  tolerate ROM tasks to B UE and LE 2 x/day with 25% improvement/carryover demonstrated allowing for improved positioning to reduce risk of further skin break down.                          Skilled PT Minutes Provided: 25   Communication with Treatment Team:     Equipment recommendations:       At end of treatment, patient remained:   Up in chair     Up in wheelchair in room   x In bed    With alarm activated    Bed rails up   x Call bell in reach    x Family/friends present    Restraints secured properly    In bathroom with CNA/RN notified    Nurse aware    In gym with therapist/tech    Other:

## 2023-05-24 LAB
ANION GAP SERPL CALC-SCNC: 12 MEQ/L
ANISOCYTOSIS BLD QL SMEAR: ABNORMAL
BASOPHILS # BLD AUTO: 0.01 X10(3)/MCL
BASOPHILS NFR BLD AUTO: 0.2 %
BUN SERPL-MCNC: 59.7 MG/DL (ref 8.4–25.7)
CALCIUM SERPL-MCNC: 8.2 MG/DL (ref 8.4–10.2)
CHLORIDE SERPL-SCNC: 105 MMOL/L (ref 98–107)
CO2 SERPL-SCNC: 20 MMOL/L (ref 22–29)
CREAT SERPL-MCNC: 1.63 MG/DL (ref 0.73–1.18)
CREAT/UREA NIT SERPL: 37
EOSINOPHIL # BLD AUTO: 0.2 X10(3)/MCL (ref 0–0.9)
EOSINOPHIL NFR BLD AUTO: 3.9 %
ERYTHROCYTE [DISTWIDTH] IN BLOOD BY AUTOMATED COUNT: 28.9 % (ref 11.5–17)
GFR SERPLBLD CREATININE-BSD FMLA CKD-EPI: 49 MLS/MIN/1.73/M2
GLUCOSE SERPL-MCNC: 146 MG/DL (ref 74–100)
HCT VFR BLD AUTO: 31.7 % (ref 42–52)
HGB BLD-MCNC: 8.7 G/DL (ref 14–18)
HYPOCHROMIA BLD QL SMEAR: ABNORMAL
LYMPHOCYTES # BLD AUTO: 1.6 X10(3)/MCL (ref 0.6–4.6)
LYMPHOCYTES NFR BLD AUTO: 30.8 %
MAGNESIUM SERPL-MCNC: 2.2 MG/DL (ref 1.6–2.6)
MCH RBC QN AUTO: 22.6 PG (ref 27–31)
MCHC RBC AUTO-ENTMCNC: 27.4 G/DL (ref 33–36)
MCV RBC AUTO: 82.3 FL (ref 80–94)
MONOCYTES # BLD AUTO: 0.37 X10(3)/MCL (ref 0.1–1.3)
MONOCYTES NFR BLD AUTO: 7.1 %
NEUTROPHILS # BLD AUTO: 2.99 X10(3)/MCL (ref 2.1–9.2)
NEUTROPHILS NFR BLD AUTO: 57.6 %
PLATELET # BLD AUTO: 153 X10(3)/MCL (ref 130–400)
PLATELET # BLD EST: NORMAL 10*3/UL
PMV BLD AUTO: ABNORMAL FL
POCT GLUCOSE: 117 MG/DL (ref 70–110)
POCT GLUCOSE: 139 MG/DL (ref 70–110)
POCT GLUCOSE: 164 MG/DL (ref 70–110)
POCT GLUCOSE: 175 MG/DL (ref 70–110)
POTASSIUM SERPL-SCNC: 5.1 MMOL/L (ref 3.5–5.1)
RBC # BLD AUTO: 3.85 X10(6)/MCL (ref 4.7–6.1)
RBC MORPH BLD: ABNORMAL
SODIUM SERPL-SCNC: 137 MMOL/L (ref 136–145)
WBC # SPEC AUTO: 5.19 X10(3)/MCL (ref 4.5–11.5)

## 2023-05-24 PROCEDURE — 94760 N-INVAS EAR/PLS OXIMETRY 1: CPT

## 2023-05-24 PROCEDURE — 83735 ASSAY OF MAGNESIUM: CPT | Performed by: STUDENT IN AN ORGANIZED HEALTH CARE EDUCATION/TRAINING PROGRAM

## 2023-05-24 PROCEDURE — 25000003 PHARM REV CODE 250: Performed by: STUDENT IN AN ORGANIZED HEALTH CARE EDUCATION/TRAINING PROGRAM

## 2023-05-24 PROCEDURE — 97535 SELF CARE MNGMENT TRAINING: CPT

## 2023-05-24 PROCEDURE — A4216 STERILE WATER/SALINE, 10 ML: HCPCS | Performed by: STUDENT IN AN ORGANIZED HEALTH CARE EDUCATION/TRAINING PROGRAM

## 2023-05-24 PROCEDURE — 63600175 PHARM REV CODE 636 W HCPCS: Performed by: STUDENT IN AN ORGANIZED HEALTH CARE EDUCATION/TRAINING PROGRAM

## 2023-05-24 PROCEDURE — 85025 COMPLETE CBC W/AUTO DIFF WBC: CPT | Performed by: STUDENT IN AN ORGANIZED HEALTH CARE EDUCATION/TRAINING PROGRAM

## 2023-05-24 PROCEDURE — 27000207 HC ISOLATION

## 2023-05-24 PROCEDURE — 11000004 HC SNF PRIVATE

## 2023-05-24 PROCEDURE — 80048 BASIC METABOLIC PNL TOTAL CA: CPT | Performed by: STUDENT IN AN ORGANIZED HEALTH CARE EDUCATION/TRAINING PROGRAM

## 2023-05-24 RX ADMIN — FOLIC ACID 2 MG: 1 TABLET ORAL at 09:05

## 2023-05-24 RX ADMIN — LOPERAMIDE HYDROCHLORIDE 2 MG: 2 CAPSULE ORAL at 09:05

## 2023-05-24 RX ADMIN — ESCITALOPRAM 5 MG: 5 TABLET, FILM COATED ORAL at 09:05

## 2023-05-24 RX ADMIN — HYDROCODONE BITARTRATE AND ACETAMINOPHEN 1 TABLET: 10; 325 TABLET ORAL at 05:05

## 2023-05-24 RX ADMIN — HYDROCODONE BITARTRATE AND ACETAMINOPHEN 1 TABLET: 10; 325 TABLET ORAL at 11:05

## 2023-05-24 RX ADMIN — CEFTRIAXONE SODIUM 1 G: 1 INJECTION, POWDER, FOR SOLUTION INTRAMUSCULAR; INTRAVENOUS at 05:05

## 2023-05-24 RX ADMIN — INSULIN DETEMIR 20 UNITS: 100 INJECTION, SOLUTION SUBCUTANEOUS at 08:05

## 2023-05-24 RX ADMIN — SITAGLIPTIN 100 MG: 50 TABLET, FILM COATED ORAL at 09:05

## 2023-05-24 RX ADMIN — INSULIN ASPART 10 UNITS: 100 INJECTION, SOLUTION INTRAVENOUS; SUBCUTANEOUS at 05:05

## 2023-05-24 RX ADMIN — Medication 1 CAPSULE: at 12:05

## 2023-05-24 RX ADMIN — SODIUM CHLORIDE, PRESERVATIVE FREE 10 ML: 5 INJECTION INTRAVENOUS at 06:05

## 2023-05-24 RX ADMIN — Medication 1 CAPSULE: at 09:05

## 2023-05-24 RX ADMIN — ENOXAPARIN SODIUM 100 MG: 100 INJECTION SUBCUTANEOUS at 02:05

## 2023-05-24 RX ADMIN — SODIUM CHLORIDE, PRESERVATIVE FREE 10 ML: 5 INJECTION INTRAVENOUS at 12:05

## 2023-05-24 RX ADMIN — ENOXAPARIN SODIUM 100 MG: 100 INJECTION SUBCUTANEOUS at 05:05

## 2023-05-24 RX ADMIN — COLLAGENASE SANTYL: 250 OINTMENT TOPICAL at 09:05

## 2023-05-24 RX ADMIN — NIFEDIPINE 60 MG: 30 TABLET, FILM COATED, EXTENDED RELEASE ORAL at 09:05

## 2023-05-24 RX ADMIN — INSULIN ASPART 10 UNITS: 100 INJECTION, SOLUTION INTRAVENOUS; SUBCUTANEOUS at 11:05

## 2023-05-24 RX ADMIN — Medication 1 CAPSULE: at 05:05

## 2023-05-24 RX ADMIN — SODIUM CHLORIDE, PRESERVATIVE FREE 10 ML: 5 INJECTION INTRAVENOUS at 11:05

## 2023-05-24 RX ADMIN — FERROUS SULFATE TAB 325 MG (65 MG ELEMENTAL FE) 1 EACH: 325 (65 FE) TAB at 09:05

## 2023-05-24 RX ADMIN — CARVEDILOL 25 MG: 12.5 TABLET, FILM COATED ORAL at 09:05

## 2023-05-24 NOTE — PROGRESS NOTES
Name: Antonio Galvan II    : 1965 (57 y.o.)  MRN: [id number]            Interdisciplinary Team Conference     Case conference held with patient/family and care team to discuss progress, plan of care, barriers to be addressed for safe return home, equipment recommendations, and discharge planning. Communicated therapy progress with MD, RN, therapy clinicians and case management. All questions/concerns answered.
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
## 2023-05-24 NOTE — PLAN OF CARE
Problem: Adult Inpatient Plan of Care  Goal: Plan of Care Review  Outcome: Ongoing, Progressing  Flowsheets (Taken 5/24/2023 0105)  Plan of Care Reviewed With: patient  Goal: Patient-Specific Goal (Individualized)  Outcome: Ongoing, Progressing  Flowsheets (Taken 5/24/2023 0105)  Anxieties, Fears or Concerns: wounds care  Individualized Care Needs: abt therapy     Problem: Diabetes Comorbidity  Goal: Blood Glucose Level Within Targeted Range  Outcome: Ongoing, Progressing  Intervention: Monitor and Manage Glycemia  Flowsheets (Taken 5/24/2023 0105)  Glycemic Management: blood glucose monitored

## 2023-05-24 NOTE — NURSING
Encouraged pt to turn q2 pt states he will let us know when needs to change position offered several times when making rounds expressed importance pt expressed understanding.

## 2023-05-24 NOTE — NURSING
Discussed am bowel program with pt and family member pt states his caregiver will perform on wed morning when she arrives so he expressed he doesn't want us to do this am will pass on to am nurse.

## 2023-05-24 NOTE — PROGRESS NOTES
PT attempted x 2 for ROM tasks, but both times pt was outside in his chair with visitors. Caregiver can complete ROM once in bed and PT to return tomorrow. Attempts: 1400 and 1445

## 2023-05-24 NOTE — PROGRESS NOTES
Spoke with April Esquivel [phone] at   regarding wound vac rental order [id number].   Notifed her that pt is currently in the hospital, however, highly probable discharged date for tomorrow and pt will need to be discharged on the home vac.  Measurements for R buttock verbally given.  Clinicals will be faxed when patient comes to outpatient wound care visit on Friday 5/26/23.   Fax clinicals to [phone]

## 2023-05-24 NOTE — PLAN OF CARE
[facility]  Discharge Reassessment    Primary Care Provider: Jennifer Fernando NP    Expected Discharge Date:     Reassessment (most recent)       Discharge Reassessment - 05/24/23 0801          Discharge Reassessment    Assessment Type Discharge Planning Reassessment     Did the patient's condition or plan change since previous assessment? No     Discharge Plan discussed with: Parent(s)     Name(s) and Number(s) Judy mother [phone]     Communicated SADE with patient/caregiver Date not available/Unable to determine     Discharge Plan A Home Health;Home with family     DME Needed Upon Discharge  none     Why the patient remains in the hospital Requires continued medical care        Post-Acute Status    Post-Acute Authorization Home Health     Home Health Status Pending medical clearance/testing     Hospital Resources/Appts/Education Provided Provided patient/caregiver with written discharge plan information     Discharge Delays None known at this time

## 2023-05-24 NOTE — PLAN OF CARE
Wife and mother present for staffing. Pt-Continuing ROM. Plan to discharge Thursday with outpatient IV abx

## 2023-05-24 NOTE — PLAN OF CARE
Problem: Adult Inpatient Plan of Care  Goal: Plan of Care Review  Outcome: Ongoing, Progressing  Flowsheets (Taken 5/24/2023 0800)  Plan of Care Reviewed With:   patient   caregiver  Goal: Patient-Specific Goal (Individualized)  Outcome: Ongoing, Progressing  Flowsheets (Taken 5/24/2023 0800)  Anxieties, Fears or Concerns: Home therapy and discharge  Individualized Care Needs: Staff will provide antibiotic therapy as scheduled and maintain vac/therapy at 125mm     Problem: Diabetes Comorbidity  Goal: Blood Glucose Level Within Targeted Range  Outcome: Ongoing, Progressing     Problem: Infection  Goal: Absence of Infection Signs and Symptoms  Outcome: Ongoing, Progressing     Problem: Impaired Wound Healing  Goal: Optimal Wound Healing  Outcome: Ongoing, Progressing     Problem: Fall Injury Risk  Goal: Absence of Fall and Fall-Related Injury  Outcome: Ongoing, Progressing

## 2023-05-25 VITALS
RESPIRATION RATE: 18 BRPM | SYSTOLIC BLOOD PRESSURE: 127 MMHG | HEART RATE: 84 BPM | DIASTOLIC BLOOD PRESSURE: 78 MMHG | HEIGHT: 68 IN | BODY MASS INDEX: 33.65 KG/M2 | TEMPERATURE: 98 F | WEIGHT: 222 LBS | OXYGEN SATURATION: 98 %

## 2023-05-25 LAB
HCT VFR BLD AUTO: 29.9 % (ref 42–52)
HGB BLD-MCNC: 8.7 G/DL (ref 14–18)
POCT GLUCOSE: 173 MG/DL (ref 70–110)
POCT GLUCOSE: 96 MG/DL (ref 70–110)

## 2023-05-25 PROCEDURE — A4216 STERILE WATER/SALINE, 10 ML: HCPCS | Performed by: STUDENT IN AN ORGANIZED HEALTH CARE EDUCATION/TRAINING PROGRAM

## 2023-05-25 PROCEDURE — 63600175 PHARM REV CODE 636 W HCPCS: Performed by: HOSPITALIST

## 2023-05-25 PROCEDURE — 25000003 PHARM REV CODE 250: Performed by: STUDENT IN AN ORGANIZED HEALTH CARE EDUCATION/TRAINING PROGRAM

## 2023-05-25 PROCEDURE — 85014 HEMATOCRIT: CPT | Performed by: HOSPITALIST

## 2023-05-25 PROCEDURE — 25000003 PHARM REV CODE 250: Performed by: HOSPITALIST

## 2023-05-25 PROCEDURE — 63600175 PHARM REV CODE 636 W HCPCS: Performed by: STUDENT IN AN ORGANIZED HEALTH CARE EDUCATION/TRAINING PROGRAM

## 2023-05-25 PROCEDURE — C1751 CATH, INF, PER/CENT/MIDLINE: HCPCS

## 2023-05-25 RX ORDER — FOLIC ACID 1 MG/1
2 TABLET ORAL DAILY
Qty: 30 TABLET | Refills: 0 | Status: ON HOLD | OUTPATIENT
Start: 2023-05-25 | End: 2023-09-27

## 2023-05-25 RX ORDER — ERTAPENEM 1 G/1
1 INJECTION, POWDER, LYOPHILIZED, FOR SOLUTION INTRAMUSCULAR; INTRAVENOUS DAILY
Qty: 42 EACH | Refills: 0 | Status: SHIPPED | OUTPATIENT
Start: 2023-05-25 | End: 2023-05-25 | Stop reason: HOSPADM

## 2023-05-25 RX ORDER — FERROUS SULFATE 325(65) MG
325 TABLET, DELAYED RELEASE (ENTERIC COATED) ORAL DAILY
Qty: 30 TABLET | Refills: 11 | Status: ON HOLD | OUTPATIENT
Start: 2023-05-25 | End: 2023-09-27

## 2023-05-25 RX ORDER — HYDROCODONE BITARTRATE AND ACETAMINOPHEN 10; 325 MG/1; MG/1
1 TABLET ORAL EVERY 4 HOURS PRN
Qty: 18 TABLET | Refills: 0 | Status: ON HOLD | OUTPATIENT
Start: 2023-05-25 | End: 2023-09-27

## 2023-05-25 RX ORDER — LACTOBACILLUS ACIDOPHILUS 500MM CELL
1 CAPSULE ORAL
Qty: 90 CAPSULE | Refills: 3 | Status: ON HOLD | OUTPATIENT
Start: 2023-05-25 | End: 2023-09-27

## 2023-05-25 RX ORDER — CARVEDILOL 25 MG/1
25 TABLET ORAL DAILY
Qty: 30 TABLET | Refills: 11 | Status: ON HOLD | OUTPATIENT
Start: 2023-05-25 | End: 2023-09-27

## 2023-05-25 RX ORDER — NIFEDIPINE 60 MG/1
60 TABLET, EXTENDED RELEASE ORAL DAILY
Qty: 30 TABLET | Refills: 11 | Status: ON HOLD | OUTPATIENT
Start: 2023-05-25 | End: 2023-09-27

## 2023-05-25 RX ORDER — ENOXAPARIN SODIUM 100 MG/ML
1 INJECTION SUBCUTANEOUS EVERY 12 HOURS
Qty: 60 ML | Refills: 11 | Status: ON HOLD | OUTPATIENT
Start: 2023-05-25 | End: 2023-09-27

## 2023-05-25 RX ORDER — LIDOCAINE 40 MG/G
CREAM TOPICAL EVERY OTHER DAY
Qty: 100 G | Refills: 0 | Status: SHIPPED | OUTPATIENT
Start: 2023-05-25 | End: 2023-06-24

## 2023-05-25 RX ADMIN — SODIUM CHLORIDE, PRESERVATIVE FREE 10 ML: 5 INJECTION INTRAVENOUS at 12:05

## 2023-05-25 RX ADMIN — INSULIN ASPART 10 UNITS: 100 INJECTION, SOLUTION INTRAVENOUS; SUBCUTANEOUS at 07:05

## 2023-05-25 RX ADMIN — COLLAGENASE SANTYL: 250 OINTMENT TOPICAL at 09:05

## 2023-05-25 RX ADMIN — INSULIN ASPART 10 UNITS: 100 INJECTION, SOLUTION INTRAVENOUS; SUBCUTANEOUS at 12:05

## 2023-05-25 RX ADMIN — NIFEDIPINE 60 MG: 30 TABLET, FILM COATED, EXTENDED RELEASE ORAL at 10:05

## 2023-05-25 RX ADMIN — HYDROCODONE BITARTRATE AND ACETAMINOPHEN 1 TABLET: 10; 325 TABLET ORAL at 10:05

## 2023-05-25 RX ADMIN — SODIUM CHLORIDE, PRESERVATIVE FREE 10 ML: 5 INJECTION INTRAVENOUS at 05:05

## 2023-05-25 RX ADMIN — ESCITALOPRAM 5 MG: 5 TABLET, FILM COATED ORAL at 10:05

## 2023-05-25 RX ADMIN — ERTAPENEM 1 G: 1 INJECTION, POWDER, LYOPHILIZED, FOR SOLUTION INTRAMUSCULAR; INTRAVENOUS at 10:05

## 2023-05-25 RX ADMIN — Medication 1 CAPSULE: at 12:05

## 2023-05-25 RX ADMIN — FERROUS SULFATE TAB 325 MG (65 MG ELEMENTAL FE) 1 EACH: 325 (65 FE) TAB at 10:05

## 2023-05-25 RX ADMIN — ENOXAPARIN SODIUM 100 MG: 100 INJECTION SUBCUTANEOUS at 03:05

## 2023-05-25 RX ADMIN — Medication 1 CAPSULE: at 10:05

## 2023-05-25 RX ADMIN — CARVEDILOL 25 MG: 12.5 TABLET, FILM COATED ORAL at 10:05

## 2023-05-25 RX ADMIN — FOLIC ACID 2 MG: 1 TABLET ORAL at 10:05

## 2023-05-25 RX ADMIN — SITAGLIPTIN 100 MG: 50 TABLET, FILM COATED ORAL at 10:05

## 2023-05-25 RX ADMIN — LOPERAMIDE HYDROCHLORIDE 2 MG: 2 CAPSULE ORAL at 10:05

## 2023-05-25 NOTE — PLAN OF CARE
Problem: Adult Inpatient Plan of Care  Goal: Readiness for Transition of Care  Outcome: Adequate for Care Transition  Intervention: Mutually Develop Transition Plan  Flowsheets (Taken 5/25/2023 0800)  Equipment Currently Used at Home: (bedside commode)   bath bench   grab bar   power chair   lift device   other (see comments)  Transportation Anticipated: family or friend will provide  Who are your caregiver(s) and their phone number(s)?: Judy Velazquez [phone]  Communicated SADE with patient/caregiver: (Discharge today 5/25/23)   Yes   Other (see comments)  Do you expect to return to your current living situation?: Yes  Do you have help at home or someone to help you manage your care at home?: Yes  Readmission within 30 days?: No  Do you currently have service(s) that help you manage your care at home?: No     Problem: Diabetes Comorbidity  Goal: Blood Glucose Level Within Targeted Range  Outcome: Adequate for Care Transition     Problem: Glycemic Control Impaired (Sepsis/Septic Shock)  Goal: Blood Glucose Level Within Desired Range  Outcome: Adequate for Care Transition     Problem: Impaired Wound Healing  Goal: Optimal Wound Healing  Outcome: Adequate for Care Transition

## 2023-05-25 NOTE — NURSING
Nurses Note -- 4 Eyes      5/25/2023   3:20 AM      Skin assessed during: Daily Assessment      [] No Altered Skin Integrity Present    []Prevention Measures Documented      [x] Yes- Altered Skin Integrity Present or Discovered   [x] LDA Added if Not in Epic (Describe Wound)   [x] New Altered Skin Integrity was Present on Admit and Documented in LDA   [x] Wound Image Taken    Wound Care Consulted? No    Attending Nurse:  Christa Vidales LPN     Second RN/Staff Member:  VARSHA Brantley

## 2023-05-25 NOTE — DISCHARGE INSTRUCTIONS
Please follow all discharge instructions as given. KEEP ALL FOLLOW UP APPOINTMENTS!        If you experience any worsening or NEW signs or symptoms please call your primary care provider or head to your nearest emergency department.           THANK YOU FOR CHOOSING [facility].  If you have any questions please call [phone].

## 2023-05-25 NOTE — NURSING
Late Entry: Patient discharged at 1350.  Upon entering patient's room to stop IV ABX; fluids was noticed on the floor near his bedside.  IV line traced backwards and found on patient's bed not attached to his midline.  The lumen was also not attached to the midline; it was still attached to the IV line. Patient was bleeding from his midline.  MD notified; verbal order for H & H.  If results WNL continue with discharging patient to home.  H & H 8.7/29.9.  Patient's sacral wound care was completed, wound vac seal reinforced, and connected to his home wound vac.  Discharge instructions provided to patient and medications sent in to [facility] in [place] with the except of his prescription for his ABX which was printed and handed to his mother at the bedside.  No further questions asked.   No distress noted.

## 2023-05-25 NOTE — NURSING
Spoke with kris, pharmacist([facility]) about lovenox 100 mg that was due at 0250. Previous dose of lovenox was given at 1750 and medication is due every 12 hours. Stated that it was okay to give medication at this time

## 2023-05-25 NOTE — PROGRESS NOTES
Made rounds today. Pt reports possible discharged today. Intake improved, % of meals. Pt stated mouth feeling better, but does have sore on lip.Family present. Pt likes wahumbleles.  Current diet: diabetic    Recs:       Continue diabetic diet.     Shawna Arriaga RD LDN  Clinical Dietitian  [facility]

## 2023-05-25 NOTE — PLAN OF CARE
Problem: Adult Inpatient Plan of Care  Goal: Plan of Care Review  Outcome: Ongoing, Progressing  Flowsheets (Taken 5/25/2023 0229)  Plan of Care Reviewed With: patient  Goal: Patient-Specific Goal (Individualized)  Outcome: Ongoing, Progressing  Flowsheets (Taken 5/25/2023 0229)  Anxieties, Fears or Concerns: none stated  Individualized Care Needs: maintain wound vac until end of shift 7a  Goal: Absence of Hospital-Acquired Illness or Injury  Outcome: Ongoing, Progressing  Intervention: Prevent Skin Injury  Flowsheets (Taken 5/25/2023 0229)  Body Position: supine  Skin Protection: adhesive use limited  Intervention: Prevent and Manage VTE (Venous Thromboembolism) Risk  Flowsheets (Taken 5/25/2023 0229)  VTE Prevention/Management: bleeding precations maintained  Range of Motion: active ROM (range of motion) encouraged  Intervention: Prevent Infection  Flowsheets (Taken 5/25/2023 0229)  Infection Prevention:   environmental surveillance performed   personal protective equipment utilized   single patient room provided   rest/sleep promoted   equipment surfaces disinfected   hand hygiene promoted

## 2023-05-26 ENCOUNTER — PATIENT OUTREACH (OUTPATIENT)
Dept: ADMINISTRATIVE | Facility: CLINIC | Age: 58
End: 2023-05-26
Payer: MEDICARE

## 2023-05-26 ENCOUNTER — INFUSION (OUTPATIENT)
Dept: INFUSION THERAPY | Facility: HOSPITAL | Age: 58
End: 2023-05-26
Attending: HOSPITALIST
Payer: MEDICARE

## 2023-05-26 ENCOUNTER — HOSPITAL ENCOUNTER (OUTPATIENT)
Dept: WOUND CARE | Facility: HOSPITAL | Age: 58
Discharge: HOME | End: 2023-05-26
Attending: NURSE PRACTITIONER
Payer: MEDICARE

## 2023-05-26 ENCOUNTER — PATIENT MESSAGE (OUTPATIENT)
Dept: ADMINISTRATIVE | Facility: CLINIC | Age: 58
End: 2023-05-26
Payer: MEDICARE

## 2023-05-26 VITALS
OXYGEN SATURATION: 100 % | SYSTOLIC BLOOD PRESSURE: 103 MMHG | DIASTOLIC BLOOD PRESSURE: 68 MMHG | TEMPERATURE: 98 F | RESPIRATION RATE: 18 BRPM | HEART RATE: 124 BPM

## 2023-05-26 DIAGNOSIS — L89.613 PRESSURE INJURY OF RIGHT HEEL, STAGE 3: ICD-10-CM

## 2023-05-26 DIAGNOSIS — M86.9 BONE INFECTION: ICD-10-CM

## 2023-05-26 DIAGNOSIS — L89.314 PRESSURE INJURY OF RIGHT ISCHIUM, STAGE 4: ICD-10-CM

## 2023-05-26 DIAGNOSIS — L89.513 PRESSURE INJURY OF RIGHT ANKLE, STAGE 3: ICD-10-CM

## 2023-05-26 DIAGNOSIS — L89.224 PRESSURE INJURY OF TROCHANTERIC REGION OF LEFT HIP, STAGE 4: Primary | ICD-10-CM

## 2023-05-26 DIAGNOSIS — M86.9 BONE INFECTION: Primary | ICD-10-CM

## 2023-05-26 DIAGNOSIS — L89.44 PRESSURE INJURY OF CONTIGUOUS REGION INVOLVING BACK AND BUTTOCK, STAGE 4, UNSPECIFIED LATERALITY: ICD-10-CM

## 2023-05-26 PROCEDURE — 63600175 PHARM REV CODE 636 W HCPCS: Performed by: HOSPITALIST

## 2023-05-26 PROCEDURE — A4216 STERILE WATER/SALINE, 10 ML: HCPCS | Performed by: HOSPITALIST

## 2023-05-26 PROCEDURE — 99213 OFFICE O/P EST LOW 20 MIN: CPT

## 2023-05-26 PROCEDURE — 25000003 PHARM REV CODE 250: Performed by: HOSPITALIST

## 2023-05-26 PROCEDURE — 97606 NEG PRS WND THER DME>50 SQCM: CPT

## 2023-05-26 PROCEDURE — 96365 THER/PROPH/DIAG IV INF INIT: CPT

## 2023-05-26 PROCEDURE — 96375 TX/PRO/DX INJ NEW DRUG ADDON: CPT | Mod: 59

## 2023-05-26 RX ORDER — HEPARIN 100 UNIT/ML
500 SYRINGE INTRAVENOUS
Status: CANCELLED | OUTPATIENT
Start: 2023-05-26

## 2023-05-26 RX ORDER — SODIUM CHLORIDE 0.9 % (FLUSH) 0.9 %
10 SYRINGE (ML) INJECTION
Status: CANCELLED | OUTPATIENT
Start: 2023-05-26

## 2023-05-26 RX ORDER — HEPARIN 100 UNIT/ML
500 SYRINGE INTRAVENOUS
Status: DISCONTINUED | OUTPATIENT
Start: 2023-05-26 | End: 2023-09-27

## 2023-05-26 RX ORDER — SODIUM CHLORIDE 0.9 % (FLUSH) 0.9 %
10 SYRINGE (ML) INJECTION
Status: DISCONTINUED | OUTPATIENT
Start: 2023-05-26 | End: 2023-09-27

## 2023-05-26 RX ADMIN — Medication 10 ML: at 11:05

## 2023-05-26 RX ADMIN — Medication 20 ML: at 11:05

## 2023-05-26 RX ADMIN — HEPARIN 500 UNITS: 100 SYRINGE at 11:05

## 2023-05-26 RX ADMIN — ERTAPENEM 1 G: 1 INJECTION, POWDER, LYOPHILIZED, FOR SOLUTION INTRAMUSCULAR; INTRAVENOUS at 11:05

## 2023-05-26 NOTE — PATIENT INSTRUCTIONS
Cleanse wound with: NS, soap and water, or wound cleanser  Periwound care: Skin prep; Hydrocolloid border to periwound of wound vac sites (L trochanter/R ischium)  Primary dressing: Crushed Metronidazole/Santyl/Black Granufoam to L trochanter and R iscium. Aquacel Ag to R heel; Santyl/Mesalt to sacrum  Secondary dressing: Wound vac drape to L trochanter and R ischium. Gauze, abd pad, kerlix to R anterior ankle and R heel. Gauze, abd pad, secure with retention tape to sacrum  Offloading: Offload as much as possible  Edema control: Elevation; Tubigrip F to RLE   Frequency: Tues/Fri dressing changes in wound care center for L trochanter and R ischium; Every 2-3 days to R heel. Daily to sacrum   Follow-up: Tues/Fri in wound care center

## 2023-05-26 NOTE — PROGRESS NOTES
[facility]      Subjective:     Patient seen in clinic today.  Dressing changed as ordered.      Assessment:          Negative Pressure Wound Therapy  03/20/23 1500 Right (Active)   03/20/23 1500   Side: Right   Orientation:    Location: Ischial tuberosity   Additional Comments:    Removal Indication and Assessment:    Location:    SDO Location:    NPWT Type Vacuum Therapy 05/26/23 1323   Therapy Setting NPWT Continuous therapy 05/26/23 1323   Pressure Setting NPWT 125 mmHg 05/26/23 1323   Therapy Interventions NPWT Dressing changed;Y connector 05/26/23 1323   Sponges Inserted NPWT Black;2 05/26/23 1323   Sponges Removed NPWT Black;2 05/26/23 1323            Negative Pressure Wound Therapy  05/15/23 1344 Left (Active)   05/15/23 1344   Side: Left   Orientation:    Location: Hip   Additional Comments:    Removal Indication and Assessment:    Location:    SDO Location:    NPWT Type Vacuum Therapy 05/26/23 1323   Therapy Setting NPWT Continuous therapy 05/26/23 1323   Pressure Setting NPWT 125 mmHg 05/26/23 1323   Therapy Interventions NPWT Dressing changed;Y connector 05/26/23 1323   Sponges Inserted NPWT Black;1 05/26/23 1323   Sponges Removed NPWT Black;1 05/26/23 1323            Altered Skin Integrity 01/12/23 1530 Sacral spine Full thickness tissue loss with exposed bone, tendon, or muscle. Often includes undermining and tunneling. May extend into muscle and/or supporting structures. (Active)   01/12/23 1530   Altered Skin Integrity Present on Admission - Did Patient arrive to the hospital with altered skin?: yes   Side:    Orientation:    Location: Sacral spine   Wound Number:    Is this injury device related?:    Primary Wound Type:    Description of Altered Skin Integrity: Full thickness tissue loss with exposed bone, tendon, or muscle. Often includes undermining and tunneling. May extend into muscle and/or supporting structures.   Ankle-Brachial Index:    Pulses:    Removal Indication and  Assessment:    Wound Outcome:    (Retired) Wound Length (cm):    (Retired) Wound Width (cm):    (Retired) Depth (cm):    Wound Description (Comments):    Removal Indications:    Wound Image   05/26/23 1311   Description of Altered Skin Integrity Full thickness tissue loss. Subcutaneous fat may be visible but bone, tendon or muscle are not exposed 05/26/23 1311   Dressing Appearance Moist drainage 05/26/23 1311   Drainage Amount Moderate 05/26/23 1311   Drainage Characteristics/Odor Serosanguineous;Sanguineous;No odor 05/26/23 1311   Appearance Pink;Red;Granulating;Yellow;White;Fibrin 05/26/23 1311   Tissue loss description Full thickness 05/26/23 1311   Periwound Area Excoriated;Macerated;Scar tissue 05/26/23 1311   Wound Edges Jagged;Irregular 05/26/23 1311   Wound Length (cm) 4.5 cm 05/26/23 1311   Wound Width (cm) 1.5 cm 05/26/23 1311   Wound Depth (cm) 0.4 cm 05/26/23 1311   Wound Volume (cm^3) 2.7 cm^3 05/26/23 1311   Wound Surface Area (cm^2) 6.75 cm^2 05/26/23 1311   Care Cleansed with:;Antimicrobial agent 05/26/23 1311   Dressing Applied;Sodium chloride impregnated;Gauze;Absorptive Pad;Other (comment) 05/26/23 1311   Periwound Care Skin barrier film applied 05/26/23 1311            Altered Skin Integrity 01/12/23 1532 Right Ischial tuberosity Full thickness tissue loss with exposed bone, tendon, or muscle. Often includes undermining and tunneling. May extend into muscle and/or supporting structures. (Active)   01/12/23 1532   Altered Skin Integrity Present on Admission - Did Patient arrive to the hospital with altered skin?: yes   Side: Right   Orientation:    Location: Ischial tuberosity   Wound Number:    Is this injury device related?:    Primary Wound Type:    Description of Altered Skin Integrity: Full thickness tissue loss with exposed bone, tendon, or muscle. Often includes undermining and tunneling. May extend into muscle and/or supporting structures.   Ankle-Brachial Index:    Pulses:    Removal  Indication and Assessment:    Wound Outcome:    (Retired) Wound Length (cm):    (Retired) Wound Width (cm):    (Retired) Depth (cm):    Wound Description (Comments):    Removal Indications:    Wound Image   05/26/23 1311   Description of Altered Skin Integrity Full thickness tissue loss with exposed bone, tendon, or muscle. Often includes undermining and tunneling. May extend into muscle and/or supporting structures. 05/26/23 1311   Dressing Appearance Intact;Moist drainage 05/26/23 1311   Drainage Amount Moderate 05/26/23 1311   Drainage Characteristics/Odor Serosanguineous;No odor;Bleeding controlled 05/26/23 1311   Appearance Red;Granulating;Tan;White;Not granulating;Slough;Fibrin;Adipose 05/26/23 1311   Tissue loss description Full thickness 05/26/23 1311   Red (%), Wound Tissue Color 65 % 05/26/23 1311   Yellow (%), Wound Tissue Color 35 % 05/26/23 1311   Periwound Area Intact;Scar tissue 05/26/23 1311   Wound Edges Defined 05/26/23 1311   Wound Length (cm) 11.6 cm 05/26/23 1311   Wound Width (cm) 15 cm 05/26/23 1311   Wound Depth (cm) 3.1 cm 05/26/23 1311   Wound Volume (cm^3) 539.4 cm^3 05/26/23 1311   Wound Surface Area (cm^2) 174 cm^2 05/26/23 1311   Care Cleansed with:;Antimicrobial agent;Other (see comments) 05/26/23 1311            Altered Skin Integrity Left Greater trochanter Full thickness tissue loss. Base is covered by slough and/or eschar in the wound bed (Active)       Altered Skin Integrity Present on Admission - Did Patient arrive to the hospital with altered skin?: yes   Side: Left   Orientation:    Location: Greater trochanter   Wound Number:    Is this injury device related?:    Primary Wound Type:    Description of Altered Skin Integrity: Full thickness tissue loss. Base is covered by slough and/or eschar in the wound bed   Ankle-Brachial Index:    Pulses:    Removal Indication and Assessment:    Wound Outcome:    (Retired) Wound Length (cm):    (Retired) Wound Width (cm):    (Retired) Depth  (cm):    Wound Description (Comments):    Removal Indications:    Wound Image   05/26/23 1311   Description of Altered Skin Integrity Full thickness tissue loss with exposed bone, tendon, or muscle. Often includes undermining and tunneling. May extend into muscle and/or supporting structures. 05/26/23 1311   Dressing Appearance Intact;Moist drainage 05/26/23 1311   Drainage Amount Moderate 05/26/23 1311   Drainage Characteristics/Odor Serosanguineous 05/26/23 1311   Appearance Red;Granulating;White;Fibrin;Slough 05/26/23 1311   Tissue loss description Full thickness 05/26/23 1311   Red (%), Wound Tissue Color 80 % 05/26/23 1311   Yellow (%), Wound Tissue Color 20 % 05/26/23 1311   Periwound Area Intact 05/26/23 1311   Wound Edges Defined 05/26/23 1311   Wound Length (cm) 4.2 cm 05/26/23 1311   Wound Width (cm) 2.7 cm 05/26/23 1311   Wound Depth (cm) 2.1 cm 05/26/23 1311   Wound Volume (cm^3) 23.814 cm^3 05/26/23 1311   Wound Surface Area (cm^2) 11.34 cm^2 05/26/23 1311   Undermining (depth (cm)/location) 7-1 o'clock / 3.8 05/26/23 1311   Care Cleansed with:;Antimicrobial agent;Applied:;Other (see comments) 05/26/23 1311   Dressing Other (comment) 05/26/23 1311         ICD-10-CM ICD-9-CM   1. 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 Pressure injury of right heel, stage 3  L89.613 707.07     707.23   3. 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, stage 4, unspecified laterality  L89.44 707.09     707.24   4. Pressure injury of right ischium, stage 4  L89.314 707.05     707.24   5. Pressure injury of right ankle, stage 3  L89.513 707.06     707.23             Orders Placed This Encounter   Procedures    Change dressing     Cleanse wound with: NS, soap and water, or wound cleanser  Periwound care: Skin prep; Hydrocolloid border to periwound of wound vac sites (L trochanter and R ischium)  Primary dressing: Crushed Metronidazole/Santyl/Black  Granufoam to L trochanter and R iscium. Aquacel Ag to R heel; Santyl/Mesalt to sacrum  Secondary dressing: Wound vac drape to L trochanter and R ischium. Gauze, abd pad, kerlix to R anterior ankle and R heel. Gauze, abd pad, secure with retention tape to sacrum  Offloading: Offload as much as possible  Edema control: Elevation; Tubigrip F to RLE   Frequency: Tues/Fri dressing changes in wound care center for L trochanter and R ischium; Every 2-3 days to R heel. Daily to sacrum   Follow-up: Tues/Fri in wound care center     Standing Status:   Standing     Number of Occurrences:   20     Standing Expiration Date:   7/26/2023

## 2023-05-26 NOTE — PROGRESS NOTES
C3 nurse attempted to contact Antonio Galvan II for a TCC post hospital discharge follow up call. No answer. Left voicemail with callback information. The patient has a scheduled HOS appointment with Jennifer Fernando NP on 5/31/23 @ 11:00.

## 2023-05-27 ENCOUNTER — INFUSION (OUTPATIENT)
Dept: INFUSION THERAPY | Facility: HOSPITAL | Age: 58
End: 2023-05-27
Attending: HOSPITALIST
Payer: MEDICARE

## 2023-05-27 RX ORDER — HEPARIN 100 UNIT/ML
500 SYRINGE INTRAVENOUS
Status: CANCELLED | OUTPATIENT
Start: 2023-05-27

## 2023-05-27 RX ORDER — SODIUM CHLORIDE 0.9 % (FLUSH) 0.9 %
10 SYRINGE (ML) INJECTION
Status: CANCELLED | OUTPATIENT
Start: 2023-05-27
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
## 2023-05-27 NOTE — PROGRESS NOTES
Pt taken to outpt surgery room 1. Vitals obtained. TEMP 97.4 HR 63 RR 18 SPO2 98% BP 78/41. Pt and significant other said that it was not uncommom for his BP to be that low. BP monitored and a pressure of 94/60 was obtained from his right forearm. Pt has a midline in the left arm. Pt denies any dizziness and states that he feels fine. Invanz 1 gram in 100ml Sodium Chloride started at 1053. Medication completed infusion at 1123. Pt. Midline flushed with 10ml NS and then Heparin 500 units and clamped. Pt D/C at 1140.

## 2023-05-27 NOTE — PLAN OF CARE
[facility]  Discharge Final Note    Primary Care Provider: Jennifer Fernando NP    Expected Discharge Date: 5/25/2023    Final Discharge Note (most recent)       Final Note - 05/27/23 1844          Final Note    Assessment Type Final Discharge Note     Anticipated Discharge Disposition Home or Self Care     What phone number can be called within the next 1-3 days to see how you are doing after discharge? [phone]     Hospital Resources/Appts/Education Provided Provided patient/caregiver with written discharge plan information        Post-Acute Status    Post-Acute Authorization IV Infusion     IV Infusion Status Set-up Complete/Auth obtained     Patient choice form signed by patient/caregiver List with quality metrics by geographic area provided     Discharge Delays None known at this time                     Important Message from Medicare             Contact Info       Jennifer Fernando NP   Specialty: Family Medicine   Relationship: PCP - General    [address] [place] [id number]   Phone: [phone]       Next Steps: Follow up on 5/31/2023    Instructions: Appointment is at 11:00 am.  Spoke to Kiara to confirm.    [facility]        Next Steps: Follow up on 5/26/2023    Instructions: Appointment is at [address] [facility] [address] in the Wound Care Clinic.    Appointment time is 12:30 pm with Wound Care Nurse.    [place] [facility]    Phone: [phone]       Next Steps: Follow up    Instructions: Clinic will call patient to schedule appointment directly. Spoke to Fouzia.

## 2023-05-28 ENCOUNTER — INFUSION (OUTPATIENT)
Dept: INFUSION THERAPY | Facility: HOSPITAL | Age: 58
End: 2023-05-28
Attending: HOSPITALIST
Payer: MEDICARE

## 2023-05-28 RX ORDER — HEPARIN 100 UNIT/ML
500 SYRINGE INTRAVENOUS
Status: CANCELLED | OUTPATIENT
Start: 2023-05-28

## 2023-05-28 RX ORDER — SODIUM CHLORIDE 0.9 % (FLUSH) 0.9 %
10 SYRINGE (ML) INJECTION
Status: CANCELLED | OUTPATIENT
Start: 2023-05-28

## 2023-05-28 NOTE — PROGRESS NOTES
Pt arrived and placed in Outpt surgery room 1. Vitals obtained. BP 96/60 HR 64 RR 16 SPO2 97% TEMP 97.9. Ertapenum 1 gram in 100 ML NS started at 1045 at patient's left upper arm midline. Infusion completed at 1115. Midline flushed with 10ml NS and 500units heparin. Pt DC.

## 2023-05-29 ENCOUNTER — INFUSION (OUTPATIENT)
Dept: INFUSION THERAPY | Facility: HOSPITAL | Age: 58
End: 2023-05-29
Attending: HOSPITALIST
Payer: MEDICARE

## 2023-05-29 VITALS
DIASTOLIC BLOOD PRESSURE: 66 MMHG | HEIGHT: 68 IN | BODY MASS INDEX: 33.65 KG/M2 | SYSTOLIC BLOOD PRESSURE: 101 MMHG | RESPIRATION RATE: 18 BRPM | TEMPERATURE: 98 F | HEART RATE: 111 BPM | OXYGEN SATURATION: 100 % | WEIGHT: 222 LBS

## 2023-05-29 DIAGNOSIS — M86.9 BONE INFECTION: Primary | ICD-10-CM

## 2023-05-29 PROCEDURE — 96365 THER/PROPH/DIAG IV INF INIT: CPT

## 2023-05-29 PROCEDURE — A4216 STERILE WATER/SALINE, 10 ML: HCPCS | Performed by: HOSPITALIST

## 2023-05-29 PROCEDURE — 96375 TX/PRO/DX INJ NEW DRUG ADDON: CPT

## 2023-05-29 PROCEDURE — 63600175 PHARM REV CODE 636 W HCPCS: Performed by: HOSPITALIST

## 2023-05-29 PROCEDURE — 25000003 PHARM REV CODE 250: Performed by: HOSPITALIST

## 2023-05-29 RX ORDER — SODIUM CHLORIDE 0.9 % (FLUSH) 0.9 %
10 SYRINGE (ML) INJECTION
Status: CANCELLED | OUTPATIENT
Start: 2023-05-29

## 2023-05-29 RX ORDER — HEPARIN 100 UNIT/ML
500 SYRINGE INTRAVENOUS
Status: DISCONTINUED | OUTPATIENT
Start: 2023-05-29 | End: 2023-09-27

## 2023-05-29 RX ORDER — SODIUM CHLORIDE 0.9 % (FLUSH) 0.9 %
10 SYRINGE (ML) INJECTION
Status: DISCONTINUED | OUTPATIENT
Start: 2023-05-29 | End: 2023-09-27

## 2023-05-29 RX ORDER — HEPARIN 100 UNIT/ML
500 SYRINGE INTRAVENOUS
Status: CANCELLED | OUTPATIENT
Start: 2023-05-29

## 2023-05-29 RX ADMIN — Medication 10 ML: at 11:05

## 2023-05-29 RX ADMIN — HEPARIN 500 UNITS: 100 SYRINGE at 11:05

## 2023-05-29 RX ADMIN — ERTAPENEM 1 G: 1 INJECTION, POWDER, LYOPHILIZED, FOR SOLUTION INTRAMUSCULAR; INTRAVENOUS at 11:05

## 2023-05-29 RX ADMIN — Medication 20 ML: at 11:05

## 2023-05-29 NOTE — PT/OT/SLP DISCHARGE
Physical Therapy Discharge Summary    Name: Antonio Galvan II  MRN: [id number]   Principal Problem: Open wound of left hip     Patient Discharged from acute Physical Therapy on 23.  Please refer to prior PT noted date on 23 for functional status.     Assessment:     Patient appropriate for care in another setting.    Objective:     GOALS:   Multidisciplinary Problems       Physical Therapy Goals          Problem: Physical Therapy    Goal Priority Disciplines Outcome Goal Variances Interventions   Physical Therapy Goal     PT, PT/OT Ongoing, Progressing     Description: Description: Goals to be met by: Discharge      Patient will increase functional independence with mobility by performin. Pt to be able to tolerate ROM tasks to B UE and LE 2 x/day with 25% improvement/carryover demonstrated allowing for improved positioning to reduce risk of further skin break down.                          Reasons for Discontinuation of Therapy Services  Transfer to alternate level of care.      Plan:     Patient Discharged to: Home no PT services needed and infusion clinic  .      2023

## 2023-05-30 ENCOUNTER — HOSPITAL ENCOUNTER (OUTPATIENT)
Dept: WOUND CARE | Facility: HOSPITAL | Age: 58
Discharge: HOME | End: 2023-05-30
Attending: NURSE PRACTITIONER
Payer: MEDICARE

## 2023-05-30 ENCOUNTER — INFUSION (OUTPATIENT)
Dept: INFUSION THERAPY | Facility: HOSPITAL | Age: 58
End: 2023-05-30
Attending: HOSPITALIST
Payer: MEDICARE

## 2023-05-30 VITALS
BODY MASS INDEX: 33.65 KG/M2 | TEMPERATURE: 98 F | DIASTOLIC BLOOD PRESSURE: 56 MMHG | HEIGHT: 68 IN | RESPIRATION RATE: 18 BRPM | WEIGHT: 222 LBS | HEART RATE: 109 BPM | OXYGEN SATURATION: 100 % | SYSTOLIC BLOOD PRESSURE: 96 MMHG

## 2023-05-30 DIAGNOSIS — L89.224 PRESSURE INJURY OF TROCHANTERIC REGION OF LEFT HIP, STAGE 4: ICD-10-CM

## 2023-05-30 DIAGNOSIS — L89.513 PRESSURE INJURY OF RIGHT ANKLE, STAGE 3: ICD-10-CM

## 2023-05-30 DIAGNOSIS — M86.9 BONE INFECTION: Primary | ICD-10-CM

## 2023-05-30 DIAGNOSIS — L89.44 PRESSURE INJURY OF CONTIGUOUS REGION INVOLVING BACK AND BUTTOCK, STAGE 4, UNSPECIFIED LATERALITY: ICD-10-CM

## 2023-05-30 DIAGNOSIS — L89.314 PRESSURE INJURY OF RIGHT ISCHIUM, STAGE 4: ICD-10-CM

## 2023-05-30 DIAGNOSIS — L89.613 PRESSURE INJURY OF RIGHT HEEL, STAGE 3: ICD-10-CM

## 2023-05-30 PROCEDURE — 25000003 PHARM REV CODE 250: Performed by: HOSPITALIST

## 2023-05-30 PROCEDURE — 99213 OFFICE O/P EST LOW 20 MIN: CPT | Mod: ,,, | Performed by: PEDIATRICS

## 2023-05-30 PROCEDURE — 97606 NEG PRS WND THER DME>50 SQCM: CPT

## 2023-05-30 PROCEDURE — 99215 OFFICE O/P EST HI 40 MIN: CPT | Mod: 25

## 2023-05-30 PROCEDURE — 99213 PR OFFICE/OUTPT VISIT, EST, LEVL III, 20-29 MIN: ICD-10-PCS | Mod: ,,, | Performed by: PEDIATRICS

## 2023-05-30 PROCEDURE — 63600175 PHARM REV CODE 636 W HCPCS: Performed by: HOSPITALIST

## 2023-05-30 PROCEDURE — A4216 STERILE WATER/SALINE, 10 ML: HCPCS | Performed by: HOSPITALIST

## 2023-05-30 PROCEDURE — 96365 THER/PROPH/DIAG IV INF INIT: CPT

## 2023-05-30 PROCEDURE — 96375 TX/PRO/DX INJ NEW DRUG ADDON: CPT

## 2023-05-30 RX ORDER — SODIUM CHLORIDE 0.9 % (FLUSH) 0.9 %
10 SYRINGE (ML) INJECTION
Status: DISCONTINUED | OUTPATIENT
Start: 2023-05-30 | End: 2023-09-27

## 2023-05-30 RX ORDER — SODIUM CHLORIDE 0.9 % (FLUSH) 0.9 %
10 SYRINGE (ML) INJECTION
Status: CANCELLED | OUTPATIENT
Start: 2023-05-30

## 2023-05-30 RX ORDER — HEPARIN 100 UNIT/ML
500 SYRINGE INTRAVENOUS
Status: CANCELLED | OUTPATIENT
Start: 2023-05-30

## 2023-05-30 RX ORDER — HEPARIN 100 UNIT/ML
500 SYRINGE INTRAVENOUS
Status: DISCONTINUED | OUTPATIENT
Start: 2023-05-30 | End: 2023-09-27

## 2023-05-30 RX ADMIN — Medication 30 ML: at 11:05

## 2023-05-30 RX ADMIN — ERTAPENEM 1 G: 1 INJECTION, POWDER, LYOPHILIZED, FOR SOLUTION INTRAMUSCULAR; INTRAVENOUS at 11:05

## 2023-05-30 RX ADMIN — HEPARIN 500 UNITS: 100 SYRINGE at 11:05

## 2023-05-30 NOTE — PROGRESS NOTES
Subjective:       Patient ID: Antonio Galvan II is a 57 y.o. male.    Chief Complaint: Pressure Ulcer (Pressure ulcers to R ischium, L trochanter, sacrum, R heel. MD visit and re-assessment. NPWT to R ischium and L trochanter. Receiving IV abx )    HPI  Review of Systems      Objective:      Temp:  [98.4 °F (36.9 °C)]   Pulse:  [109]   Resp:  [18]   BP: (96)/(56)   SpO2:  [100 %]   Physical Exam       Negative Pressure Wound Therapy  03/20/23 1500 Right (Active)   03/20/23 1500   Side: Right   Orientation:    Location: Ischial tuberosity   Additional Comments:    Removal Indication and Assessment:    Location:    SDO Location:    NPWT Type Vacuum Therapy 05/30/23 1608   Therapy Setting NPWT Continuous therapy 05/30/23 1608   Pressure Setting NPWT 125 mmHg 05/30/23 1608   Therapy Interventions NPWT Dressing changed;Canister changed;Seal intact;Y connector 05/30/23 1608   Sponges Inserted NPWT Black;2 05/30/23 1608   Sponges Removed NPWT Black;2 05/30/23 1608            Negative Pressure Wound Therapy  05/15/23 1344 Left (Active)   05/15/23 1344   Side: Left   Orientation:    Location: Hip   Additional Comments:    Removal Indication and Assessment:    Location:    SDO Location:    NPWT Type Vacuum Therapy 05/30/23 1608   Therapy Setting NPWT Continuous therapy 05/30/23 1608   Pressure Setting NPWT 125 mmHg 05/30/23 1608   Therapy Interventions NPWT Canister changed;Dressing changed;Seal intact;Y connector 05/30/23 1608   Sponges Inserted NPWT Black;2 05/30/23 1608   Sponges Removed NPWT Black;2 05/30/23 1608            Altered Skin Integrity 05/06/22 1140 Right Heel  Full thickness tissue loss. Subcutaneous fat may be visible but bone, tendon or muscle are not exposed (Active)   05/06/22 1140   Altered Skin Integrity Present on Admission - Did Patient arrive to the hospital with altered skin?: yes   Side: Right   Orientation:    Location: Heel   Wound Number:    Is this injury device related?: No   Primary  Wound Type:    Description of Altered Skin Integrity: Full thickness tissue loss. Subcutaneous fat may be visible but bone, tendon or muscle are not exposed   Ankle-Brachial Index:    Pulses:    Removal Indication and Assessment:    Wound Outcome:    (Retired) Wound Length (cm):    (Retired) Wound Width (cm):    (Retired) Depth (cm):    Wound Description (Comments):    Removal Indications:    Dressing Appearance Intact;Moist drainage 05/30/23 1608   Drainage Amount Moderate 05/30/23 1608   Drainage Characteristics/Odor Serosanguineous;No odor;Bleeding controlled 05/30/23 1608   Appearance Red;Granulating;Tan;Slough 05/30/23 1608   Tissue loss description Full thickness 05/30/23 1608   Periwound Area Scar tissue;Excoriated;Ecchymotic 05/30/23 1608   Wound Edges Defined 05/30/23 1608   Wound Length (cm) 3.5 cm 05/30/23 1608   Wound Width (cm) 3.4 cm 05/30/23 1608   Wound Depth (cm) 0.1 cm 05/30/23 1608   Wound Volume (cm^3) 1.19 cm^3 05/30/23 1608   Wound Surface Area (cm^2) 11.9 cm^2 05/30/23 1608   Care Cleansed with:;Antimicrobial agent 05/30/23 1608   Dressing Applied;Hydrofiber;Silver;Gauze;Absorptive Pad;Rolled gauze;Tubular bandage 05/30/23 1608   Periwound Care Skin barrier film applied;Topical treatment applied 05/30/23 1608   Compression Tubular elasticized bandage 05/30/23 1608            Altered Skin Integrity 01/12/23 1530 Sacral spine Full thickness tissue loss with exposed bone, tendon, or muscle. Often includes undermining and tunneling. May extend into muscle and/or supporting structures. (Active)   01/12/23 1530   Altered Skin Integrity Present on Admission - Did Patient arrive to the hospital with altered skin?: yes   Side:    Orientation:    Location: Sacral spine   Wound Number:    Is this injury device related?:    Primary Wound Type:    Description of Altered Skin Integrity: Full thickness tissue loss with exposed bone, tendon, or muscle. Often includes undermining and tunneling. May extend into  muscle and/or supporting structures.   Ankle-Brachial Index:    Pulses:    Removal Indication and Assessment:    Wound Outcome:    (Retired) Wound Length (cm):    (Retired) Wound Width (cm):    (Retired) Depth (cm):    Wound Description (Comments):    Removal Indications:    Wound Image   05/30/23 1608   Description of Altered Skin Integrity Full thickness tissue loss. Subcutaneous fat may be visible but bone, tendon or muscle are not exposed 05/30/23 1608   Dressing Appearance Moist drainage 05/30/23 1608   Drainage Amount Moderate 05/30/23 1608   Drainage Characteristics/Odor Serosanguineous;No odor;Bleeding controlled 05/30/23 1608   Appearance Pink;Red;Granulating;Fibrin;Slough 05/30/23 1608   Tissue loss description Full thickness 05/30/23 1608   Periwound Area Ecchymotic;Excoriated;Denuded 05/30/23 1608   Wound Edges Irregular;Jagged 05/30/23 1608   Wound Length (cm) 5.5 cm 05/30/23 1608   Wound Width (cm) 1.5 cm 05/30/23 1608   Wound Depth (cm) 1 cm 05/30/23 1608   Wound Volume (cm^3) 8.25 cm^3 05/30/23 1608   Wound Surface Area (cm^2) 8.25 cm^2 05/30/23 1608   Care Cleansed with:;Sterile normal saline 05/30/23 1608   Dressing Applied;Hydrofiber;Silver;Gauze;Absorptive Pad;Other (comment) 05/30/23 1608   Periwound Care Skin barrier film applied 05/30/23 1608            Altered Skin Integrity 01/12/23 1532 Right Ischial tuberosity Full thickness tissue loss with exposed bone, tendon, or muscle. Often includes undermining and tunneling. May extend into muscle and/or supporting structures. (Active)   01/12/23 1532   Altered Skin Integrity Present on Admission - Did Patient arrive to the hospital with altered skin?: yes   Side: Right   Orientation:    Location: Ischial tuberosity   Wound Number:    Is this injury device related?:    Primary Wound Type:    Description of Altered Skin Integrity: Full thickness tissue loss with exposed bone, tendon, or muscle. Often includes undermining and tunneling. May extend  into muscle and/or supporting structures.   Ankle-Brachial Index:    Pulses:    Removal Indication and Assessment:    Wound Outcome:    (Retired) Wound Length (cm):    (Retired) Wound Width (cm):    (Retired) Depth (cm):    Wound Description (Comments):    Removal Indications:    Wound Image   05/30/23 1608   Description of Altered Skin Integrity Full thickness tissue loss with exposed bone, tendon, or muscle. Often includes undermining and tunneling. May extend into muscle and/or supporting structures. 05/30/23 1608   Dressing Appearance Intact;Moist drainage 05/30/23 1608   Drainage Amount Moderate 05/30/23 1608   Drainage Characteristics/Odor Serosanguineous;No odor;Bleeding controlled 05/30/23 1608   Appearance Pink;Red;Granulating;Tan;White;Slough;Fibrin;Adipose;Ecchymotic 05/30/23 1608   Tissue loss description Full thickness 05/30/23 1608   Red (%), Wound Tissue Color 60 % 05/30/23 1608   Yellow (%), Wound Tissue Color 40 % 05/30/23 1608   Periwound Area Scar tissue;Moist 05/30/23 1608   Wound Edges Defined 05/30/23 1608   Wound Length (cm) 12 cm 05/30/23 1608   Wound Width (cm) 15.2 cm 05/30/23 1608   Wound Depth (cm) 3.3 cm 05/30/23 1608   Wound Volume (cm^3) 601.92 cm^3 05/30/23 1608   Wound Surface Area (cm^2) 182.4 cm^2 05/30/23 1608   Care Cleansed with:;Antimicrobial agent 05/30/23 1608   Dressing Applied;Other (comment) 05/30/23 1608   Periwound Care Skin barrier film applied;Hydrocolloid barrier applied 05/30/23 1608            Altered Skin Integrity 02/09/23 1324 Right anterior Ankle Ulceration Full thickness tissue loss. Subcutaneous fat may be visible but bone, tendon or muscle are not exposed (Active)   02/09/23 1324   Altered Skin Integrity Present on Admission - Did Patient arrive to the hospital with altered skin?: yes   Side: Right   Orientation: anterior   Location: Ankle   Wound Number:    Is this injury device related?:    Primary Wound Type: Ulceration   Description of Altered Skin  Integrity: Full thickness tissue loss. Subcutaneous fat may be visible but bone, tendon or muscle are not exposed   Ankle-Brachial Index:    Pulses:    Removal Indication and Assessment:    Wound Outcome:    (Retired) Wound Length (cm):    (Retired) Wound Width (cm):    (Retired) Depth (cm):    Wound Description (Comments):    Removal Indications:    Dressing Appearance Intact;Dry 05/30/23 1608   Drainage Amount None 05/30/23 1608   Appearance Closed/resurfaced 05/30/23 1608   Periwound Area Intact 05/30/23 1608   Wound Length (cm) 0 cm 05/30/23 1608   Wound Width (cm) 0 cm 05/30/23 1608   Wound Depth (cm) 0 cm 05/30/23 1608   Wound Volume (cm^3) 0 cm^3 05/30/23 1608   Wound Surface Area (cm^2) 0 cm^2 05/30/23 1608   Care Cleansed with: 05/30/23 1608            Altered Skin Integrity Left Greater trochanter Full thickness tissue loss. Base is covered by slough and/or eschar in the wound bed (Active)       Altered Skin Integrity Present on Admission - Did Patient arrive to the hospital with altered skin?: yes   Side: Left   Orientation:    Location: Greater trochanter   Wound Number:    Is this injury device related?:    Primary Wound Type:    Description of Altered Skin Integrity: Full thickness tissue loss. Base is covered by slough and/or eschar in the wound bed   Ankle-Brachial Index:    Pulses:    Removal Indication and Assessment:    Wound Outcome:    (Retired) Wound Length (cm):    (Retired) Wound Width (cm):    (Retired) Depth (cm):    Wound Description (Comments):    Removal Indications:    Wound Image   05/30/23 1608   Wound Length (cm) 4.2 cm 05/30/23 1608   Wound Width (cm) 2.4 cm 05/30/23 1608   Wound Depth (cm) 2.1 cm 05/30/23 1608   Wound Volume (cm^3) 21.168 cm^3 05/30/23 1608   Wound Surface Area (cm^2) 10.08 cm^2 05/30/23 1608   Undermining (depth (cm)/location) 3-9 o'clock / 3.6 at 5 o'clock 05/30/23 1608   Care Cleansed with:;Antimicrobial agent 05/30/23 3608   Dressing Applied;Other (comment)  05/30/23 1608   Periwound Care Skin barrier film applied 05/30/23 1608         Assessment:     Patient comes in today after being in the hospital over a month.  Including his diagnosis have been clinical osteomyelitis because of exposed bone,, anemia.  He also has large pressure ulcers of several areas of his lower body.  Right foot with ankle and heel ulceration.  This is granulating in nature.  Aquacel Ag was applied to the heel.  Sacral wound good granulation tissue.  This was also treated with Aquacel Ag.  Right ischial tuberosity wound has areas of granulation tissue with exposed bone and slough.  This area was cleaned and wound VAC was applied.  Left greater trochanter ulcer has fat and granular tissue.  Wound VAC was applied also to this area.  Patient is currently on Invanz IV and will continue daily for 40 days.  Patient has ESBL.  Today we ordered CBC, CMP, CRP, and sed rate.  Patient is to see his PCP tomorrow.  We are going to consult infectious disease for his clinical osteomyelitis.  Patient return Friday dressing changes and return in 1 week for physician visit.    ICD-10-CM ICD-9-CM   1. 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 Pressure injury of right heel, stage 3  L89.613 707.07     707.23   3. 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, stage 4, unspecified laterality  L89.44 707.09     707.24   4. Pressure injury of right ischium, stage 4  L89.314 707.05     707.24   5. Pressure injury of right ankle, stage 3  L89.513 707.06     707.23         Plan:   Tissue pathology and/or culture taken:  [] Yes [x] No   Sharp debridement performed:   [] Yes [x] No   Labs ordered this visit:   [x] Yes [] No   Imaging ordered this visit:   [] Yes [x] No           Orders Placed This Encounter   Procedures    CBC Auto Differential     Standing Status:   Future     Standing Expiration Date:   7/28/2024    Comprehensive Metabolic Panel      Standing Status:   Future     Standing Expiration Date:   7/28/2024    C-Reactive Protein     Standing Status:   Future     Standing Expiration Date:   7/28/2024    Sedimentation rate     Standing Status:   Future     Standing Expiration Date:   7/28/2024    Ambulatory referral/consult to Infectious Disease     Standing Status:   Future     Standing Expiration Date:   6/30/2024     Referral Priority:   Routine     Referral Type:   Consultation     Referral Reason:   Specialty Services Required     Requested Specialty:   Infectious Diseases     Number of Visits Requested:   1    Change dressing     Cleanse wound with: NS, soap and water, or wound cleanser  Periwound care: Skin prep; Hydrocolloid border to periwound of wound vac sites (L trochanter/R ischium)  Primary dressing: Crushed Metronidazole/Santyl/Black Granufoam to L trochanter and R iscium. Aquacel Ag to R heel; Aquacel Ag to Sacrum   Secondary dressing: Wound vac drape to L trochanter and R ischium. Gauze, abd pad, kerlix to R anterior ankle and R heel. Gauze, abd pad, secure with retention tape to sacrum  Offloading: Offload as much as possible  Edema control: Elevation; Tubigrip F to RLE   Frequency: Tues/Fri dressing changes in wound care center for L trochanter and R ischium; Every 2-3 days to R heel. Daily to sacrum   Follow-up: Tues/Fri in wound care center     Standing Status:   Standing     Number of Occurrences:   1     Standing Expiration Date:   5/30/2023        Follow up in about 3 days (around 6/2/2023) for Nurse visit Friday; MD visit in 1 week.

## 2023-05-30 NOTE — PROGRESS NOTES
Patient's mother called back and left  for return call in the TCM box. Returned call to mother. Mother not familiar with medications. She will have patient call me back. He is at wound care.

## 2023-05-30 NOTE — PATIENT INSTRUCTIONS
Cleanse wound with: NS, soap and water, or wound cleanser  Periwound care: Skin prep; Hydrocolloid border to periwound of wound vac sites (L trochanter/R ischium)  Primary dressing: Crushed Metronidazole/Santyl/Black Granufoam to L trochanter and R iscium. Aquacel Ag to R heel; Aquacel Ag to Sacrum   Secondary dressing: Wound vac drape to L trochanter and R ischium. Gauze, abd pad, kerlix to R anterior ankle and R heel. Gauze, abd pad, secure with retention tape to sacrum  Offloading: Offload as much as possible  Edema control: Elevation; Tubigrip F to RLE   Frequency: Tues/Fri dressing changes in wound care center for L trochanter and R ischium; Every 2-3 days to R heel. Daily to sacrum   Follow-up: Tues/Fri in wound care center

## 2023-05-31 ENCOUNTER — INFUSION (OUTPATIENT)
Dept: INFUSION THERAPY | Facility: HOSPITAL | Age: 58
End: 2023-05-31
Attending: HOSPITALIST
Payer: MEDICARE

## 2023-05-31 VITALS
HEIGHT: 68 IN | TEMPERATURE: 98 F | BODY MASS INDEX: 33.65 KG/M2 | RESPIRATION RATE: 18 BRPM | SYSTOLIC BLOOD PRESSURE: 94 MMHG | WEIGHT: 222 LBS | OXYGEN SATURATION: 99 % | DIASTOLIC BLOOD PRESSURE: 60 MMHG | HEART RATE: 114 BPM

## 2023-05-31 DIAGNOSIS — M86.9 BONE INFECTION: Primary | ICD-10-CM

## 2023-05-31 PROCEDURE — A4216 STERILE WATER/SALINE, 10 ML: HCPCS | Performed by: HOSPITALIST

## 2023-05-31 PROCEDURE — 96375 TX/PRO/DX INJ NEW DRUG ADDON: CPT

## 2023-05-31 PROCEDURE — 63600175 PHARM REV CODE 636 W HCPCS: Performed by: HOSPITALIST

## 2023-05-31 PROCEDURE — 96365 THER/PROPH/DIAG IV INF INIT: CPT

## 2023-05-31 PROCEDURE — 25000003 PHARM REV CODE 250: Performed by: HOSPITALIST

## 2023-05-31 RX ORDER — HEPARIN 100 UNIT/ML
500 SYRINGE INTRAVENOUS
Status: CANCELLED | OUTPATIENT
Start: 2023-05-31

## 2023-05-31 RX ORDER — SODIUM CHLORIDE 0.9 % (FLUSH) 0.9 %
10 SYRINGE (ML) INJECTION
Status: DISCONTINUED | OUTPATIENT
Start: 2023-05-31 | End: 2023-09-27

## 2023-05-31 RX ORDER — HEPARIN 100 UNIT/ML
500 SYRINGE INTRAVENOUS
Status: DISCONTINUED | OUTPATIENT
Start: 2023-05-31 | End: 2023-09-27

## 2023-05-31 RX ORDER — SODIUM CHLORIDE 0.9 % (FLUSH) 0.9 %
10 SYRINGE (ML) INJECTION
Status: CANCELLED | OUTPATIENT
Start: 2023-05-31

## 2023-05-31 RX ADMIN — ERTAPENEM 1 G: 1 INJECTION, POWDER, LYOPHILIZED, FOR SOLUTION INTRAMUSCULAR; INTRAVENOUS at 11:05

## 2023-05-31 RX ADMIN — HEPARIN 500 UNITS: 100 SYRINGE at 12:05

## 2023-05-31 RX ADMIN — Medication 30 ML: at 12:05

## 2023-05-31 RX ADMIN — Medication 10 ML: at 11:05

## 2023-05-31 NOTE — PROGRESS NOTES
C3 nurse attempted to contact Antonio Galvan II for a TCC post hospital discharge follow up call after patient called and left a message in the TCM voicemail system for a return call. No answer and no voicemail available. The patient has a scheduled [facility] appointment with Jennifer Fernando NP on 5/31/23 @ 11:00.

## 2023-06-01 ENCOUNTER — INFUSION (OUTPATIENT)
Dept: INFUSION THERAPY | Facility: HOSPITAL | Age: 58
End: 2023-06-01
Attending: HOSPITALIST
Payer: MEDICARE

## 2023-06-01 VITALS
TEMPERATURE: 98 F | DIASTOLIC BLOOD PRESSURE: 61 MMHG | RESPIRATION RATE: 18 BRPM | HEART RATE: 89 BPM | BODY MASS INDEX: 33.65 KG/M2 | SYSTOLIC BLOOD PRESSURE: 90 MMHG | HEIGHT: 68 IN | OXYGEN SATURATION: 99 % | WEIGHT: 222 LBS

## 2023-06-01 DIAGNOSIS — M86.9 BONE INFECTION: Primary | ICD-10-CM

## 2023-06-01 PROCEDURE — 96365 THER/PROPH/DIAG IV INF INIT: CPT

## 2023-06-01 PROCEDURE — 63600175 PHARM REV CODE 636 W HCPCS: Performed by: HOSPITALIST

## 2023-06-01 PROCEDURE — 96375 TX/PRO/DX INJ NEW DRUG ADDON: CPT

## 2023-06-01 PROCEDURE — 25000003 PHARM REV CODE 250: Performed by: HOSPITALIST

## 2023-06-01 PROCEDURE — A4216 STERILE WATER/SALINE, 10 ML: HCPCS | Performed by: HOSPITALIST

## 2023-06-01 RX ORDER — SODIUM CHLORIDE 0.9 % (FLUSH) 0.9 %
10 SYRINGE (ML) INJECTION
Status: DISCONTINUED | OUTPATIENT
Start: 2023-06-01 | End: 2023-09-27

## 2023-06-01 RX ORDER — HEPARIN 100 UNIT/ML
500 SYRINGE INTRAVENOUS
Status: CANCELLED | OUTPATIENT
Start: 2023-06-01

## 2023-06-01 RX ORDER — SODIUM CHLORIDE 0.9 % (FLUSH) 0.9 %
10 SYRINGE (ML) INJECTION
Status: CANCELLED | OUTPATIENT
Start: 2023-06-01

## 2023-06-01 RX ORDER — HEPARIN 100 UNIT/ML
500 SYRINGE INTRAVENOUS
Status: DISCONTINUED | OUTPATIENT
Start: 2023-06-01 | End: 2023-09-27

## 2023-06-01 RX ADMIN — ERTAPENEM 1 G: 1 INJECTION INTRAMUSCULAR; INTRAVENOUS at 11:06

## 2023-06-01 RX ADMIN — Medication 10 ML: at 11:06

## 2023-06-01 RX ADMIN — Medication 30 ML: at 12:06

## 2023-06-01 RX ADMIN — HEPARIN 500 UNITS: 100 SYRINGE at 12:06

## 2023-06-02 ENCOUNTER — HOSPITAL ENCOUNTER (OUTPATIENT)
Dept: WOUND CARE | Facility: HOSPITAL | Age: 58
Discharge: HOME | End: 2023-06-02
Attending: NURSE PRACTITIONER
Payer: MEDICARE

## 2023-06-02 ENCOUNTER — INFUSION (OUTPATIENT)
Dept: INFUSION THERAPY | Facility: HOSPITAL | Age: 58
End: 2023-06-02
Attending: HOSPITALIST
Payer: MEDICARE

## 2023-06-02 VITALS — HEART RATE: 126 BPM | OXYGEN SATURATION: 99 % | TEMPERATURE: 98 F | RESPIRATION RATE: 18 BRPM

## 2023-06-02 VITALS
HEART RATE: 109 BPM | SYSTOLIC BLOOD PRESSURE: 123 MMHG | OXYGEN SATURATION: 99 % | HEIGHT: 68 IN | BODY MASS INDEX: 33.65 KG/M2 | WEIGHT: 222 LBS | TEMPERATURE: 98 F | RESPIRATION RATE: 18 BRPM | DIASTOLIC BLOOD PRESSURE: 89 MMHG

## 2023-06-02 DIAGNOSIS — L89.224 PRESSURE INJURY OF TROCHANTERIC REGION OF LEFT HIP, STAGE 4: Primary | ICD-10-CM

## 2023-06-02 DIAGNOSIS — L89.314 PRESSURE INJURY OF RIGHT ISCHIUM, STAGE 4: ICD-10-CM

## 2023-06-02 DIAGNOSIS — L89.513 PRESSURE INJURY OF RIGHT ANKLE, STAGE 3: ICD-10-CM

## 2023-06-02 DIAGNOSIS — L89.44 PRESSURE INJURY OF CONTIGUOUS REGION INVOLVING BACK AND BUTTOCK, STAGE 4, UNSPECIFIED LATERALITY: ICD-10-CM

## 2023-06-02 DIAGNOSIS — L89.613 PRESSURE INJURY OF RIGHT HEEL, STAGE 3: ICD-10-CM

## 2023-06-02 DIAGNOSIS — M86.9 BONE INFECTION: Primary | ICD-10-CM

## 2023-06-02 DIAGNOSIS — M86.9 BONE INFECTION: ICD-10-CM

## 2023-06-02 PROCEDURE — 97606 NEG PRS WND THER DME>50 SQCM: CPT

## 2023-06-02 PROCEDURE — 96365 THER/PROPH/DIAG IV INF INIT: CPT

## 2023-06-02 PROCEDURE — A4216 STERILE WATER/SALINE, 10 ML: HCPCS | Performed by: HOSPITALIST

## 2023-06-02 PROCEDURE — 63600175 PHARM REV CODE 636 W HCPCS: Performed by: HOSPITALIST

## 2023-06-02 PROCEDURE — 96375 TX/PRO/DX INJ NEW DRUG ADDON: CPT

## 2023-06-02 PROCEDURE — 25000003 PHARM REV CODE 250: Performed by: HOSPITALIST

## 2023-06-02 RX ORDER — SODIUM CHLORIDE 0.9 % (FLUSH) 0.9 %
10 SYRINGE (ML) INJECTION
Status: CANCELLED | OUTPATIENT
Start: 2023-06-02

## 2023-06-02 RX ORDER — HEPARIN 100 UNIT/ML
500 SYRINGE INTRAVENOUS
Status: CANCELLED | OUTPATIENT
Start: 2023-06-02

## 2023-06-02 RX ORDER — SODIUM CHLORIDE 0.9 % (FLUSH) 0.9 %
10 SYRINGE (ML) INJECTION
Status: DISCONTINUED | OUTPATIENT
Start: 2023-06-02 | End: 2023-09-27

## 2023-06-02 RX ORDER — HEPARIN 100 UNIT/ML
500 SYRINGE INTRAVENOUS
Status: DISCONTINUED | OUTPATIENT
Start: 2023-06-02 | End: 2023-09-27

## 2023-06-02 RX ADMIN — HEPARIN 500 UNITS: 100 SYRINGE at 12:06

## 2023-06-02 RX ADMIN — Medication 30 ML: at 12:06

## 2023-06-02 RX ADMIN — Medication 10 ML: at 11:06

## 2023-06-02 RX ADMIN — ERTAPENEM 1 G: 1 INJECTION INTRAMUSCULAR; INTRAVENOUS at 11:06

## 2023-06-02 NOTE — PATIENT INSTRUCTIONS
Next Tuesday MD appt on 6/6/23 at 2:30pm   Next Friday Nurse Visit appt for wound vac changes only on 6/9/23 at 11:30am         Cleanse wound with: NS, soap and water, or wound cleanser  Periwound care: Skin prep; Hydrocolloid border to periwound of wound vac sites (L trochanter/R ischium)  Primary dressing: Crushed Metronidazole/Santyl/Black Granufoam to L trochanter and R iscium. Aquacel Ag to R heel; Aquacel Ag to Sacrum   Secondary dressing: Wound vac drape to L trochanter and R ischium. Gauze, abd pad, kerlix to R anterior ankle and R heel. Gauze, abd pad, secure with retention tape to sacrum  Offloading: Offload as much as possible  Edema control: Elevation; Tubigrip F to RLE   Frequency: Tues/Fri dressing changes in wound care center for L trochanter and R ischium; Every 2-3 days to R heel. Daily to sacrum   Follow-up: Tues/Fri in wound care center     Discharge Instructions regarding Wound Vac      Wound Vac: You should have appointments scheduled 2-3 times a week to provide wound care. During these appointments, your Wound Vac will be changed. Please bring the supplies that were provided with the Wound Vac to these appointments.     Should your Wound Vac begin to alarm, or you begin to have problems related to the Wound Vac itself, the Wound Vac company has 24-hour availability. [facility] is able to provide troubleshooting support for all V.A.C.® Therapy devices:      Helps provide on-the-spot problem identification and resolution or deals with service requests      Convenient telephone access for all care situations     To access this service, please contact [facility] via [phone].     If unable to troubleshoot or reach [facility] after 2 hours, remove dressing and apply wet to dry dressing over wound bed.   Follow up as scheduled in Wound Care Clinic. If you have any additional questions, contact us at [phone]

## 2023-06-02 NOTE — PROGRESS NOTES
[facility]      Subjective:     Patient seen in clinic today.  Dressing changed as ordered.      Assessment:          Negative Pressure Wound Therapy  03/20/23 1500 Right (Active)   03/20/23 1500   Side: Right   Orientation:    Location: Ischial tuberosity   Additional Comments:    Removal Indication and Assessment:    Location:    SDO Location:    NPWT Type Vacuum Therapy 06/02/23 1346   Therapy Setting NPWT Continuous therapy 06/02/23 1346   Pressure Setting NPWT 125 mmHg 06/02/23 1346   Therapy Interventions NPWT Dressing changed;Y connector;Seal intact 06/02/23 1346   Sponges Inserted NPWT Black;2 06/02/23 1346   Sponges Removed NPWT Black;2 06/02/23 1346            Negative Pressure Wound Therapy  05/15/23 1344 Left (Active)   05/15/23 1344   Side: Left   Orientation:    Location: Hip   Additional Comments:    Removal Indication and Assessment:    Location:    SDO Location:    NPWT Type Vacuum Therapy 06/02/23 1346   Therapy Setting NPWT Continuous therapy 06/02/23 1346   Pressure Setting NPWT 125 mmHg 06/02/23 1346   Therapy Interventions NPWT Dressing changed;Y connector;Seal intact 06/02/23 1346   Sponges Inserted NPWT Black;2 06/02/23 1346   Sponges Removed NPWT Black;2 06/02/23 1346            Altered Skin Integrity 01/12/23 1532 Right Ischial tuberosity Full thickness tissue loss with exposed bone, tendon, or muscle. Often includes undermining and tunneling. May extend into muscle and/or supporting structures. (Active)   01/12/23 1532   Altered Skin Integrity Present on Admission - Did Patient arrive to the hospital with altered skin?: yes   Side: Right   Orientation:    Location: Ischial tuberosity   Wound Number:    Is this injury device related?:    Primary Wound Type:    Description of Altered Skin Integrity: Full thickness tissue loss with exposed bone, tendon, or muscle. Often includes undermining and tunneling. May extend into muscle and/or supporting structures.   Ankle-Brachial  Index:    Pulses:    Removal Indication and Assessment:    Wound Outcome:    (Retired) Wound Length (cm):    (Retired) Wound Width (cm):    (Retired) Depth (cm):    Wound Description (Comments):    Removal Indications:    Description of Altered Skin Integrity Full thickness tissue loss with exposed bone, tendon, or muscle. Often includes undermining and tunneling. May extend into muscle and/or supporting structures. 06/02/23 1346   Dressing Appearance Intact;Moist drainage 06/02/23 1346   Drainage Amount Moderate 06/02/23 1346   Drainage Characteristics/Odor Creamy;Brown 06/02/23 1346   Appearance Pink;Red;Tan;Yellow;Slough;Adipose;Muscle 06/02/23 1346   Tissue loss description Full thickness 06/02/23 1346   Red (%), Wound Tissue Color 60 % 06/02/23 1346   Yellow (%), Wound Tissue Color 40 % 06/02/23 1346   Periwound Area Scar tissue;Moist 06/02/23 1346   Wound Edges Irregular;Defined 06/02/23 1346   Wound Length (cm) 12 cm 06/02/23 1346   Wound Width (cm) 15.2 cm 06/02/23 1346   Wound Depth (cm) 3 cm 06/02/23 1346   Wound Volume (cm^3) 547.2 cm^3 06/02/23 1346   Wound Surface Area (cm^2) 182.4 cm^2 06/02/23 1346   Care Cleansed with:;Antimicrobial agent 06/02/23 1346   Dressing Applied;Other (comment) 06/02/23 1346   Periwound Care Skin barrier film applied;Hydrocolloid barrier applied 06/02/23 1346            Altered Skin Integrity Left Greater trochanter Full thickness tissue loss. Base is covered by slough and/or eschar in the wound bed (Active)       Altered Skin Integrity Present on Admission - Did Patient arrive to the hospital with altered skin?: yes   Side: Left   Orientation:    Location: Greater trochanter   Wound Number:    Is this injury device related?:    Primary Wound Type:    Description of Altered Skin Integrity: Full thickness tissue loss. Base is covered by slough and/or eschar in the wound bed   Ankle-Brachial Index:    Pulses:    Removal Indication and Assessment:    Wound Outcome:    (Retired)  Wound Length (cm):    (Retired) Wound Width (cm):    (Retired) Depth (cm):    Wound Description (Comments):    Removal Indications:    Dressing Appearance Intact;Moist drainage 06/02/23 1346   Drainage Amount Small 06/02/23 1346   Drainage Characteristics/Odor Serosanguineous 06/02/23 1346   Appearance Red;Granulating;Yellow;Slough 06/02/23 1346   Tissue loss description Full thickness 06/02/23 1346   Wound Length (cm) 3.6 cm 06/02/23 1346   Wound Width (cm) 2.4 cm 06/02/23 1346   Wound Depth (cm) 2.2 cm 06/02/23 1346   Wound Volume (cm^3) 19.008 cm^3 06/02/23 1346   Wound Surface Area (cm^2) 8.64 cm^2 06/02/23 1346   Undermining (depth (cm)/location) 9 to 1 o'clock / 3.7 at 10 o'clock 06/02/23 1346   Care Cleansed with:;Antimicrobial agent 06/02/23 1346   Dressing Applied;Other (comment) 06/02/23 1346   Periwound Care Hydrocolloid barrier applied;Skin barrier film applied 06/02/23 1346         ICD-10-CM ICD-9-CM   1. 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 Pressure injury of right heel, stage 3  L89.613 707.07     707.23   3. 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, stage 4, unspecified laterality  L89.44 707.09     707.24   4. Pressure injury of right ischium, stage 4  L89.314 707.05     707.24   5. Pressure injury of right ankle, stage 3  L89.513 707.06     707.23             Orders Placed This Encounter   Procedures    Change dressing     Cleanse wound with: NS, soap and water, or wound cleanser  Periwound care: Skin prep; Hydrocolloid border to periwound of wound vac sites (L trochanter and R ischium)  Primary dressing: Crushed Metronidazole/Santyl/Black Granufoam to L trochanter and R iscium. Aquacel Ag to R heel; Santyl/Mesalt to sacrum  Secondary dressing: Wound vac drape to L trochanter and R ischium. Gauze, abd pad, kerlix to R anterior ankle and R heel. Gauze, abd pad, secure with retention tape to sacrum  Offloading: Offload as  much as possible  Edema control: Elevation; Tubigrip F to RLE   Frequency: Tues/Fri dressing changes in wound care center for L trochanter and R ischium; Every 2-3 days to R heel. Daily to sacrum   Follow-up: Tues/Fri in wound care center     Standing Status:   Standing     Number of Occurrences:   1

## 2023-06-03 ENCOUNTER — INFUSION (OUTPATIENT)
Dept: INFUSION THERAPY | Facility: HOSPITAL | Age: 58
End: 2023-06-03
Attending: HOSPITALIST
Payer: MEDICARE

## 2023-06-03 VITALS
TEMPERATURE: 98 F | HEART RATE: 84 BPM | SYSTOLIC BLOOD PRESSURE: 99 MMHG | RESPIRATION RATE: 18 BRPM | DIASTOLIC BLOOD PRESSURE: 66 MMHG

## 2023-06-03 DIAGNOSIS — M86.9 BONE INFECTION: ICD-10-CM

## 2023-06-03 PROCEDURE — 25000003 PHARM REV CODE 250: Performed by: HOSPITALIST

## 2023-06-03 PROCEDURE — 96365 THER/PROPH/DIAG IV INF INIT: CPT

## 2023-06-03 PROCEDURE — 63600175 PHARM REV CODE 636 W HCPCS: Performed by: HOSPITALIST

## 2023-06-03 RX ORDER — SODIUM CHLORIDE 0.9 % (FLUSH) 0.9 %
10 SYRINGE (ML) INJECTION
Status: CANCELLED | OUTPATIENT
Start: 2023-06-03

## 2023-06-03 RX ORDER — HEPARIN 100 UNIT/ML
500 SYRINGE INTRAVENOUS
Status: CANCELLED | OUTPATIENT
Start: 2023-06-03

## 2023-06-03 RX ADMIN — ERTAPENEM 1 G: 1 INJECTION INTRAMUSCULAR; INTRAVENOUS at 11:06

## 2023-06-03 NOTE — NURSING
Patient tolerated IV Abx infusion with no issues. Midline flushed, no redness, swelling or signs of infection noted.

## 2023-06-04 ENCOUNTER — INFUSION (OUTPATIENT)
Dept: INFUSION THERAPY | Facility: HOSPITAL | Age: 58
End: 2023-06-04
Attending: HOSPITALIST
Payer: MEDICARE

## 2023-06-04 VITALS
RESPIRATION RATE: 16 BRPM | TEMPERATURE: 98 F | SYSTOLIC BLOOD PRESSURE: 96 MMHG | OXYGEN SATURATION: 98 % | HEART RATE: 82 BPM | DIASTOLIC BLOOD PRESSURE: 64 MMHG

## 2023-06-04 DIAGNOSIS — M86.9 BONE INFECTION: Primary | ICD-10-CM

## 2023-06-04 PROCEDURE — 63600175 PHARM REV CODE 636 W HCPCS: Performed by: HOSPITALIST

## 2023-06-04 PROCEDURE — 25000003 PHARM REV CODE 250: Performed by: HOSPITALIST

## 2023-06-04 PROCEDURE — 96365 THER/PROPH/DIAG IV INF INIT: CPT

## 2023-06-04 RX ORDER — HEPARIN 100 UNIT/ML
500 SYRINGE INTRAVENOUS
Status: CANCELLED | OUTPATIENT
Start: 2023-06-04

## 2023-06-04 RX ORDER — SODIUM CHLORIDE 0.9 % (FLUSH) 0.9 %
10 SYRINGE (ML) INJECTION
Status: DISCONTINUED | OUTPATIENT
Start: 2023-06-04 | End: 2023-09-27

## 2023-06-04 RX ORDER — HEPARIN 100 UNIT/ML
500 SYRINGE INTRAVENOUS
Status: DISCONTINUED | OUTPATIENT
Start: 2023-06-04 | End: 2023-09-27

## 2023-06-04 RX ORDER — SODIUM CHLORIDE 0.9 % (FLUSH) 0.9 %
10 SYRINGE (ML) INJECTION
Status: CANCELLED | OUTPATIENT
Start: 2023-06-04

## 2023-06-04 RX ADMIN — ERTAPENEM 1 G: 1 INJECTION INTRAMUSCULAR; INTRAVENOUS at 11:06

## 2023-06-04 NOTE — NURSING
Abx given to patient. No complications, Midline flushed and positive for blood return. No swelling, redness, or drainage at the site.

## 2023-06-05 ENCOUNTER — INFUSION (OUTPATIENT)
Dept: INFUSION THERAPY | Facility: HOSPITAL | Age: 58
End: 2023-06-05
Attending: HOSPITALIST
Payer: MEDICARE

## 2023-06-05 VITALS
WEIGHT: 222 LBS | RESPIRATION RATE: 18 BRPM | HEART RATE: 91 BPM | OXYGEN SATURATION: 99 % | HEIGHT: 68 IN | TEMPERATURE: 98 F | SYSTOLIC BLOOD PRESSURE: 110 MMHG | DIASTOLIC BLOOD PRESSURE: 67 MMHG | BODY MASS INDEX: 33.65 KG/M2

## 2023-06-05 DIAGNOSIS — M86.9 BONE INFECTION: Primary | ICD-10-CM

## 2023-06-05 PROCEDURE — 96365 THER/PROPH/DIAG IV INF INIT: CPT

## 2023-06-05 PROCEDURE — 96375 TX/PRO/DX INJ NEW DRUG ADDON: CPT

## 2023-06-05 PROCEDURE — 25000003 PHARM REV CODE 250: Performed by: HOSPITALIST

## 2023-06-05 PROCEDURE — A4216 STERILE WATER/SALINE, 10 ML: HCPCS | Performed by: HOSPITALIST

## 2023-06-05 PROCEDURE — 63600175 PHARM REV CODE 636 W HCPCS: Performed by: HOSPITALIST

## 2023-06-05 RX ORDER — HEPARIN 100 UNIT/ML
500 SYRINGE INTRAVENOUS
Status: CANCELLED | OUTPATIENT
Start: 2023-06-05

## 2023-06-05 RX ORDER — SODIUM CHLORIDE 0.9 % (FLUSH) 0.9 %
10 SYRINGE (ML) INJECTION
Status: CANCELLED | OUTPATIENT
Start: 2023-06-05

## 2023-06-05 RX ORDER — SODIUM CHLORIDE 0.9 % (FLUSH) 0.9 %
10 SYRINGE (ML) INJECTION
Status: DISCONTINUED | OUTPATIENT
Start: 2023-06-05 | End: 2023-09-27

## 2023-06-05 RX ORDER — HEPARIN 100 UNIT/ML
500 SYRINGE INTRAVENOUS
Status: DISCONTINUED | OUTPATIENT
Start: 2023-06-05 | End: 2023-09-27

## 2023-06-05 RX ADMIN — HEPARIN 500 UNITS: 100 SYRINGE at 11:06

## 2023-06-05 RX ADMIN — Medication 10 ML: at 11:06

## 2023-06-05 RX ADMIN — Medication 20 ML: at 11:06

## 2023-06-05 RX ADMIN — ERTAPENEM 1 G: 1 INJECTION INTRAMUSCULAR; INTRAVENOUS at 11:06

## 2023-06-06 ENCOUNTER — INFUSION (OUTPATIENT)
Dept: INFUSION THERAPY | Facility: HOSPITAL | Age: 58
End: 2023-06-06
Attending: HOSPITALIST
Payer: MEDICARE

## 2023-06-06 ENCOUNTER — HOSPITAL ENCOUNTER (OUTPATIENT)
Dept: WOUND CARE | Facility: HOSPITAL | Age: 58
Discharge: HOME | End: 2023-06-06
Attending: NURSE PRACTITIONER
Payer: MEDICARE

## 2023-06-06 VITALS
WEIGHT: 222 LBS | HEART RATE: 89 BPM | BODY MASS INDEX: 33.65 KG/M2 | TEMPERATURE: 98 F | SYSTOLIC BLOOD PRESSURE: 91 MMHG | RESPIRATION RATE: 18 BRPM | HEIGHT: 68 IN | DIASTOLIC BLOOD PRESSURE: 64 MMHG | OXYGEN SATURATION: 98 %

## 2023-06-06 VITALS
HEART RATE: 91 BPM | OXYGEN SATURATION: 98 % | SYSTOLIC BLOOD PRESSURE: 108 MMHG | RESPIRATION RATE: 18 BRPM | DIASTOLIC BLOOD PRESSURE: 64 MMHG | TEMPERATURE: 98 F

## 2023-06-06 DIAGNOSIS — L89.314 PRESSURE INJURY OF RIGHT ISCHIUM, STAGE 4: ICD-10-CM

## 2023-06-06 DIAGNOSIS — L89.613 PRESSURE INJURY OF RIGHT HEEL, STAGE 3: ICD-10-CM

## 2023-06-06 DIAGNOSIS — L89.224 PRESSURE INJURY OF TROCHANTERIC REGION OF LEFT HIP, STAGE 4: ICD-10-CM

## 2023-06-06 DIAGNOSIS — L89.44 PRESSURE INJURY OF CONTIGUOUS REGION INVOLVING BACK AND BUTTOCK, STAGE 4, UNSPECIFIED LATERALITY: ICD-10-CM

## 2023-06-06 DIAGNOSIS — L89.513 PRESSURE INJURY OF RIGHT ANKLE, STAGE 3: ICD-10-CM

## 2023-06-06 DIAGNOSIS — M86.9 BONE INFECTION: Primary | ICD-10-CM

## 2023-06-06 LAB
ALBUMIN SERPL-MCNC: 2.5 G/DL (ref 3.5–5)
ALBUMIN/GLOB SERPL: 0.8 RATIO (ref 1.1–2)
ALP SERPL-CCNC: 115 UNIT/L (ref 40–150)
ALT SERPL-CCNC: 16 UNIT/L (ref 0–55)
AST SERPL-CCNC: 13 UNIT/L (ref 5–34)
BASOPHILS # BLD AUTO: 0.01 X10(3)/MCL
BASOPHILS NFR BLD AUTO: 0.1 %
BILIRUBIN DIRECT+TOT PNL SERPL-MCNC: 0.3 MG/DL
BUN SERPL-MCNC: 40.5 MG/DL (ref 8.4–25.7)
CALCIUM SERPL-MCNC: 8.1 MG/DL (ref 8.4–10.2)
CHLORIDE SERPL-SCNC: 106 MMOL/L (ref 98–107)
CO2 SERPL-SCNC: 24 MMOL/L (ref 22–29)
CREAT SERPL-MCNC: 1.41 MG/DL (ref 0.73–1.18)
CRP SERPL-MCNC: 4.9 MG/L
EOSINOPHIL # BLD AUTO: 0.12 X10(3)/MCL (ref 0–0.9)
EOSINOPHIL NFR BLD AUTO: 1.7 %
ERYTHROCYTE [DISTWIDTH] IN BLOOD BY AUTOMATED COUNT: 25.3 % (ref 11.5–17)
ERYTHROCYTE [SEDIMENTATION RATE] IN BLOOD: 106 MM/HR (ref 0–15)
GFR SERPLBLD CREATININE-BSD FMLA CKD-EPI: 59 MLS/MIN/1.73/M2
GLOBULIN SER-MCNC: 3.1 GM/DL (ref 2.4–3.5)
GLUCOSE SERPL-MCNC: 204 MG/DL (ref 74–100)
HCT VFR BLD AUTO: 26.3 % (ref 42–52)
HGB BLD-MCNC: 8 G/DL (ref 14–18)
IMM GRANULOCYTES # BLD AUTO: 0.01 X10(3)/MCL (ref 0–0.04)
IMM GRANULOCYTES NFR BLD AUTO: 0.1 %
LYMPHOCYTES # BLD AUTO: 1.22 X10(3)/MCL (ref 0.6–4.6)
LYMPHOCYTES NFR BLD AUTO: 17.2 %
MCH RBC QN AUTO: 24.4 PG (ref 27–31)
MCHC RBC AUTO-ENTMCNC: 30.4 G/DL (ref 33–36)
MCV RBC AUTO: 80.2 FL (ref 80–94)
MONOCYTES # BLD AUTO: 0.42 X10(3)/MCL (ref 0.1–1.3)
MONOCYTES NFR BLD AUTO: 5.9 %
NEUTROPHILS # BLD AUTO: 5.31 X10(3)/MCL (ref 2.1–9.2)
NEUTROPHILS NFR BLD AUTO: 75 %
PLATELET # BLD AUTO: 178 X10(3)/MCL (ref 130–400)
PMV BLD AUTO: 10.5 FL (ref 7.4–10.4)
POTASSIUM SERPL-SCNC: 4.1 MMOL/L (ref 3.5–5.1)
PROT SERPL-MCNC: 5.6 GM/DL (ref 6.4–8.3)
RBC # BLD AUTO: 3.28 X10(6)/MCL (ref 4.7–6.1)
SODIUM SERPL-SCNC: 138 MMOL/L (ref 136–145)
WBC # SPEC AUTO: 7.09 X10(3)/MCL (ref 4.5–11.5)

## 2023-06-06 PROCEDURE — 99213 OFFICE O/P EST LOW 20 MIN: CPT | Mod: ,,, | Performed by: PEDIATRICS

## 2023-06-06 PROCEDURE — 80053 COMPREHEN METABOLIC PANEL: CPT

## 2023-06-06 PROCEDURE — 97606 NEG PRS WND THER DME>50 SQCM: CPT

## 2023-06-06 PROCEDURE — 99215 OFFICE O/P EST HI 40 MIN: CPT | Mod: 25

## 2023-06-06 PROCEDURE — 96365 THER/PROPH/DIAG IV INF INIT: CPT

## 2023-06-06 PROCEDURE — A4216 STERILE WATER/SALINE, 10 ML: HCPCS | Performed by: HOSPITALIST

## 2023-06-06 PROCEDURE — 63600175 PHARM REV CODE 636 W HCPCS: Performed by: HOSPITALIST

## 2023-06-06 PROCEDURE — 85652 RBC SED RATE AUTOMATED: CPT

## 2023-06-06 PROCEDURE — 86140 C-REACTIVE PROTEIN: CPT

## 2023-06-06 PROCEDURE — 99213 PR OFFICE/OUTPT VISIT, EST, LEVL III, 20-29 MIN: ICD-10-PCS | Mod: ,,, | Performed by: PEDIATRICS

## 2023-06-06 PROCEDURE — 25000003 PHARM REV CODE 250: Performed by: HOSPITALIST

## 2023-06-06 PROCEDURE — 96375 TX/PRO/DX INJ NEW DRUG ADDON: CPT | Mod: 59

## 2023-06-06 PROCEDURE — 36415 COLL VENOUS BLD VENIPUNCTURE: CPT

## 2023-06-06 PROCEDURE — 36415 COLL VENOUS BLD VENIPUNCTURE: CPT | Mod: 91

## 2023-06-06 PROCEDURE — 85025 COMPLETE CBC W/AUTO DIFF WBC: CPT | Performed by: HOSPITALIST

## 2023-06-06 RX ORDER — SODIUM CHLORIDE 0.9 % (FLUSH) 0.9 %
10 SYRINGE (ML) INJECTION
Status: CANCELLED | OUTPATIENT
Start: 2023-06-06

## 2023-06-06 RX ORDER — HEPARIN 100 UNIT/ML
500 SYRINGE INTRAVENOUS
Status: DISCONTINUED | OUTPATIENT
Start: 2023-06-06 | End: 2023-09-27

## 2023-06-06 RX ORDER — HEPARIN 100 UNIT/ML
500 SYRINGE INTRAVENOUS
Status: CANCELLED | OUTPATIENT
Start: 2023-06-06

## 2023-06-06 RX ORDER — SODIUM CHLORIDE 0.9 % (FLUSH) 0.9 %
10 SYRINGE (ML) INJECTION
Status: DISCONTINUED | OUTPATIENT
Start: 2023-06-06 | End: 2023-09-27

## 2023-06-06 RX ADMIN — Medication 20 ML: at 11:06

## 2023-06-06 RX ADMIN — HEPARIN 500 UNITS: 100 SYRINGE at 11:06

## 2023-06-06 RX ADMIN — Medication 10 ML: at 11:06

## 2023-06-06 RX ADMIN — ERTAPENEM 1 G: 1 INJECTION INTRAMUSCULAR; INTRAVENOUS at 11:06

## 2023-06-06 NOTE — PATIENT INSTRUCTIONS
Next Friday Nurse Visit appt for wound vac changes only on 6/9/23 at 11:30am         Cleanse wound with: NS, soap and water, or wound cleanser  Periwound care: Skin prep; Hydrocolloid border to periwound of wound vac sites (L trochanter/R ischium)  Primary dressing: Crushed Metronidazole/Santyl/Black Granufoam to L trochanter and R iscium. Aquacel Ag to R heel; Aquacel Ag to Sacrum   Secondary dressing: Wound vac drape to L trochanter and R ischium. Gauze, abd pad, kerlix to R anterior ankle and R heel. Gauze, abd pad, secure with retention tape to sacrum  Offloading: Offload as much as possible  Edema control: Elevation; Tubigrip F to RLE   Frequency: Tues/Fri dressing changes in wound care center for L trochanter and R ischium; Every 2-3 days to R heel. Daily to sacrum   Follow-up: Tues/Fri in wound care center    Wash wound vac tubing for bleeding.  If large amount of blood or persistant amount of drainage occurs.   Remove vac and apply dry pressure dressing. Notify wound care center.  If unable to stop bleeding and on a night or weekend, go to the nearest emergency room for evaluation.        Discharge Instructions regarding Wound Vac     Wound Vac: You should have appointments scheduled 2-3 times a week to provide wound care. During these appointments, your Wound Vac will be changed. Please bring the supplies that were provided with the Wound Vac to these appointments.      Should your Wound Vac begin to alarm, or you begin to have problems related to the Wound Vac itself, the Wound Vac company has 24-hour availability. [facility] is able to provide troubleshooting support for all V.A.C.® Therapy devices:       Helps provide on-the-spot problem identification and resolution or deals with service requests       Convenient telephone access for all care situations      To access this service, please contact [facility] via [phone].      If unable to troubleshoot or reach [facility] after 2 hours, remove dressing and apply wet to  dry dressing over wound bed.   Follow up as scheduled in Wound Care Clinic. If you have any additional questions, contact us at [phone]

## 2023-06-06 NOTE — PROGRESS NOTES
Subjective:       Patient ID: Antonio Galvan II is a 57 y.o. male.    Chief Complaint: Pressure Ulcer (Pressure ulcers to L trochanter, R heel, R ischium. NPWT to R ischium and L trochanter. MD visit and re-assessment )    HPI  Review of Systems      Objective:      Temp:  [97.9 °F (36.6 °C)-98.2 °F (36.8 °C)]   Pulse:  [89-91]   Resp:  [18]   BP: ()/(64)   SpO2:  [98 %]   Physical Exam       Negative Pressure Wound Therapy  03/20/23 1500 Right (Active)   03/20/23 1500   Side: Right   Orientation:    Location: Ischial tuberosity   Additional Comments:    Removal Indication and Assessment:    Location:    SDO Location:    NPWT Type Vacuum Therapy 06/06/23 1459   Therapy Setting NPWT Continuous therapy 06/06/23 1459   Pressure Setting NPWT 125 mmHg 06/06/23 1459   Therapy Interventions NPWT Dressing changed;Seal intact 06/06/23 1459   Sponges Inserted NPWT Black;2 06/06/23 1459   Sponges Removed NPWT Black;2 06/06/23 1459            Negative Pressure Wound Therapy  05/15/23 1344 Left (Active)   05/15/23 1344   Side: Left   Orientation:    Location: Hip   Additional Comments:    Removal Indication and Assessment:    Location:    SDO Location:    NPWT Type Vacuum Therapy 06/06/23 1459   Therapy Setting NPWT Continuous therapy 06/06/23 1459   Pressure Setting NPWT 125 mmHg 06/06/23 1459   Therapy Interventions NPWT Dressing changed;Seal intact 06/06/23 1459   Sponges Inserted NPWT Black;2 06/06/23 1459   Sponges Removed NPWT Black;2 06/06/23 1459            Altered Skin Integrity 05/06/22 1140 Right Heel  Full thickness tissue loss. Subcutaneous fat may be visible but bone, tendon or muscle are not exposed (Active)   05/06/22 1140   Altered Skin Integrity Present on Admission - Did Patient arrive to the hospital with altered skin?: yes   Side: Right   Orientation:    Location: Heel   Wound Number:    Is this injury device related?: No   Primary Wound Type:    Description of Altered Skin Integrity: Full  thickness tissue loss. Subcutaneous fat may be visible but bone, tendon or muscle are not exposed   Ankle-Brachial Index:    Pulses:    Removal Indication and Assessment:    Wound Outcome:    (Retired) Wound Length (cm):    (Retired) Wound Width (cm):    (Retired) Depth (cm):    Wound Description (Comments):    Removal Indications:    Wound Image   06/06/23 1459   Dressing Appearance Intact;Dried drainage 06/06/23 1459   Drainage Amount Moderate 06/06/23 1459   Drainage Characteristics/Odor Serosanguineous 06/06/23 1459   Appearance Red;Tan;Fibrin;Hypergranulation 06/06/23 1459   Tissue loss description Full thickness 06/06/23 1459   Periwound Area Scar tissue;Ecchymotic 06/06/23 1459   Wound Edges Defined 06/06/23 1459   Wound Length (cm) 3.5 cm 06/06/23 1459   Wound Width (cm) 2.6 cm 06/06/23 1459   Wound Depth (cm) 0.1 cm 06/06/23 1459   Wound Volume (cm^3) 0.91 cm^3 06/06/23 1459   Wound Surface Area (cm^2) 9.1 cm^2 06/06/23 1459   Care Cleansed with:;Sterile normal saline 06/06/23 1459   Dressing Applied;Collagen;Gauze;Absorptive Pad;Rolled gauze;Tubular bandage 06/06/23 1459            Altered Skin Integrity 01/12/23 1530 Sacral spine Full thickness tissue loss with exposed bone, tendon, or muscle. Often includes undermining and tunneling. May extend into muscle and/or supporting structures. (Active)   01/12/23 1530   Altered Skin Integrity Present on Admission - Did Patient arrive to the hospital with altered skin?: yes   Side:    Orientation:    Location: Sacral spine   Wound Number:    Is this injury device related?:    Primary Wound Type:    Description of Altered Skin Integrity: Full thickness tissue loss with exposed bone, tendon, or muscle. Often includes undermining and tunneling. May extend into muscle and/or supporting structures.   Ankle-Brachial Index:    Pulses:    Removal Indication and Assessment:    Wound Outcome:    (Retired) Wound Length (cm):    (Retired) Wound Width (cm):    (Retired) Depth  (cm):    Wound Description (Comments):    Removal Indications:    Wound Image   06/06/23 1459   Description of Altered Skin Integrity Full thickness tissue loss with exposed bone, tendon, or muscle. Often includes undermining and tunneling. May extend into muscle and/or supporting structures. 06/06/23 1459   Dressing Appearance Intact;Moist drainage 06/06/23 1459   Drainage Amount Moderate 06/06/23 1459   Drainage Characteristics/Odor Serosanguineous 06/06/23 1459   Appearance Pink;Red;Tan;Slough;Granulating 06/06/23 1459   Tissue loss description Full thickness 06/06/23 1459   Periwound Area Denuded;Ecchymotic 06/06/23 1459   Wound Edges Irregular 06/06/23 1459   Wound Length (cm) 3.5 cm 06/06/23 1459   Wound Width (cm) 0.6 cm 06/06/23 1459   Wound Depth (cm) 0.1 cm 06/06/23 1459   Wound Volume (cm^3) 0.21 cm^3 06/06/23 1459   Wound Surface Area (cm^2) 2.1 cm^2 06/06/23 1459   Care Cleansed with:;Sterile normal saline 06/06/23 1459   Dressing Applied;Hydrofiber;Silver;Gauze 06/06/23 1459   Periwound Care Skin barrier film applied 06/06/23 1459            Altered Skin Integrity 01/12/23 1532 Right Ischial tuberosity Full thickness tissue loss with exposed bone, tendon, or muscle. Often includes undermining and tunneling. May extend into muscle and/or supporting structures. (Active)   01/12/23 1532   Altered Skin Integrity Present on Admission - Did Patient arrive to the hospital with altered skin?: yes   Side: Right   Orientation:    Location: Ischial tuberosity   Wound Number:    Is this injury device related?:    Primary Wound Type:    Description of Altered Skin Integrity: Full thickness tissue loss with exposed bone, tendon, or muscle. Often includes undermining and tunneling. May extend into muscle and/or supporting structures.   Ankle-Brachial Index:    Pulses:    Removal Indication and Assessment:    Wound Outcome:    (Retired) Wound Length (cm):    (Retired) Wound Width (cm):    (Retired) Depth (cm):     Wound Description (Comments):    Removal Indications:    Wound Image   06/06/23 1459   Description of Altered Skin Integrity Full thickness tissue loss with exposed bone, tendon, or muscle. Often includes undermining and tunneling. May extend into muscle and/or supporting structures. 06/06/23 1459   Dressing Appearance Intact;Moist drainage 06/06/23 1459   Drainage Amount Moderate 06/06/23 1459   Drainage Characteristics/Odor Brown;Creamy 06/06/23 1459   Appearance Pink;Red;Tan;Yellow;Adipose;Muscle;Granulating 06/06/23 1459   Tissue loss description Full thickness 06/06/23 1459   Red (%), Wound Tissue Color 50 % 06/06/23 1459   Yellow (%), Wound Tissue Color 50 % 06/06/23 1459   Periwound Area Scar tissue;Denuded;Moist 06/06/23 1459   Wound Edges Irregular;Defined 06/06/23 1459   Wound Length (cm) 11 cm 06/06/23 1459   Wound Width (cm) 15.2 cm 06/06/23 1459   Wound Depth (cm) 2.2 cm 06/06/23 1459   Wound Volume (cm^3) 367.84 cm^3 06/06/23 1459   Wound Surface Area (cm^2) 167.2 cm^2 06/06/23 1459   Care Cleansed with:;Antimicrobial agent;Sterile normal saline 06/06/23 1459   Dressing Applied 06/06/23 1459   Periwound Care Skin barrier film applied 06/06/23 1459            Altered Skin Integrity 02/09/23 1324 Right anterior Ankle Ulceration Full thickness tissue loss. Subcutaneous fat may be visible but bone, tendon or muscle are not exposed (Active)   02/09/23 1324   Altered Skin Integrity Present on Admission - Did Patient arrive to the hospital with altered skin?: yes   Side: Right   Orientation: anterior   Location: Ankle   Wound Number:    Is this injury device related?:    Primary Wound Type: Ulceration   Description of Altered Skin Integrity: Full thickness tissue loss. Subcutaneous fat may be visible but bone, tendon or muscle are not exposed   Ankle-Brachial Index:    Pulses:    Removal Indication and Assessment:    Wound Outcome:    (Retired) Wound Length (cm):    (Retired) Wound Width (cm):    (Retired)  Depth (cm):    Wound Description (Comments):    Removal Indications:    Dressing Appearance Intact;Dry 06/06/23 1459   Drainage Amount None 06/06/23 1459   Appearance Closed/resurfaced 06/06/23 1459   Periwound Area Scar tissue 06/06/23 1459   Wound Length (cm) 0 cm 06/06/23 1459   Wound Width (cm) 0 cm 06/06/23 1459   Wound Depth (cm) 0 cm 06/06/23 1459   Wound Volume (cm^3) 0 cm^3 06/06/23 1459   Wound Surface Area (cm^2) 0 cm^2 06/06/23 1459   Care Cleansed with:;Sterile normal saline 06/06/23 1459   Dressing Applied;Bandaid 06/06/23 1459            Altered Skin Integrity Left Greater trochanter Full thickness tissue loss. Base is covered by slough and/or eschar in the wound bed (Active)       Altered Skin Integrity Present on Admission - Did Patient arrive to the hospital with altered skin?: yes   Side: Left   Orientation:    Location: Greater trochanter   Wound Number:    Is this injury device related?:    Primary Wound Type:    Description of Altered Skin Integrity: Full thickness tissue loss. Base is covered by slough and/or eschar in the wound bed   Ankle-Brachial Index:    Pulses:    Removal Indication and Assessment:    Wound Outcome:    (Retired) Wound Length (cm):    (Retired) Wound Width (cm):    (Retired) Depth (cm):    Wound Description (Comments):    Removal Indications:    Wound Image   06/06/23 1542   Dressing Appearance Intact;Moist drainage 06/06/23 1542   Drainage Amount Moderate 06/06/23 1542   Drainage Characteristics/Odor Serosanguineous 06/06/23 1542   Appearance Red;Granulating;Tan;Slough 06/06/23 1542   Tissue loss description Full thickness 06/06/23 1542   Wound Length (cm) 3 cm 06/06/23 1542   Wound Width (cm) 2.8 cm 06/06/23 1542   Wound Depth (cm) 2 cm 06/06/23 1542   Wound Volume (cm^3) 16.8 cm^3 06/06/23 1542   Wound Surface Area (cm^2) 8.4 cm^2 06/06/23 1542   Undermining (depth (cm)/location) 9 o'clock to 1 o'clock. 3.7cm at 10 o'clock 06/06/23 1542   Care Cleansed with:;Sterile  normal saline 06/06/23 1542   Dressing Applied;Transparent film 06/06/23 1542   Periwound Care Hydrocolloid barrier applied 06/06/23 1542         Assessment:     Today the pressure injury of the right heel is improving with good granulation tissue.  Blistering has improved.  Aquacel Ag was applied with bandages.  Sacral wound again is improving.  Aquacel Ag was also applied to this wound.  Left trochanter wound has much more granulation tissue and less depth.  Right ischium wound has less necrotic tissue.  This was cleaned and crashed Flagyl and Santyl was applied.  Granular foam was applied to both wounds and wound VAC was applied.  After the applications were done was noticed that there was possibly a very small area of the right ischium that had some bleeding.  There was no drainage of blood in the tubing.  It was decided to leave the VAC in place and the caregiver was given instructions on what to do if were bleeding occurred.  Patient will return on Friday for a nurse visit and in 1 week for physician visit    ICD-10-CM ICD-9-CM   1. 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 Pressure injury of right heel, stage 3  L89.613 707.07     707.23   3. 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, stage 4, unspecified laterality  L89.44 707.09     707.24   4. Pressure injury of right ischium, stage 4  L89.314 707.05     707.24   5. Pressure injury of right ankle, stage 3  L89.513 707.06     707.23         Plan:   Tissue pathology and/or culture taken:  [] Yes [x] No   Sharp debridement performed:   [] Yes [x] No   Labs ordered this visit:   [] Yes [x] No   Imaging ordered this visit:   [] Yes [x] No           Orders Placed This Encounter   Procedures    Change dressing     Cleanse wound with: NS, soap and water, or wound cleanser  Periwound care: Skin prep; Hydrocolloid border to periwound of wound vac sites (L trochanter and R ischium)  Primary  dressing: Crushed Metronidazole/Santyl/Black Granufoam to L trochanter and R ischium. Aquacel Ag to R heel and Sacrum  Secondary dressing: Wound vac drape to L trochanter and R ischium. Gauze, abd pad, kerlix to R anterior ankle and R heel. Gauze, abd pad, secure with retention tape to sacrum  Offloading: Offload as much as possible  Edema control: Elevation; Tubigrip F to RLE   Frequency: Tues/Fri dressing changes in wound care center for L trochanter and R ischium; Every 2-3 days to R heel and sacrum    Follow-up: Tues/Fri in wound care center     Standing Status:   Standing     Number of Occurrences:   1     Standing Expiration Date:   6/6/2023        Follow up in about 3 days (around 6/9/2023) for Nurse visit Friday; MD visit on Tuesday .

## 2023-06-07 ENCOUNTER — INFUSION (OUTPATIENT)
Dept: INFUSION THERAPY | Facility: HOSPITAL | Age: 58
End: 2023-06-07
Attending: HOSPITALIST
Payer: MEDICARE

## 2023-06-07 VITALS
SYSTOLIC BLOOD PRESSURE: 90 MMHG | OXYGEN SATURATION: 98 % | BODY MASS INDEX: 33.65 KG/M2 | HEIGHT: 68 IN | DIASTOLIC BLOOD PRESSURE: 62 MMHG | HEART RATE: 87 BPM | RESPIRATION RATE: 18 BRPM | WEIGHT: 222 LBS | TEMPERATURE: 98 F

## 2023-06-07 DIAGNOSIS — M86.9 BONE INFECTION: Primary | ICD-10-CM

## 2023-06-07 PROCEDURE — 63600175 PHARM REV CODE 636 W HCPCS: Performed by: HOSPITALIST

## 2023-06-07 PROCEDURE — 25000003 PHARM REV CODE 250: Performed by: HOSPITALIST

## 2023-06-07 PROCEDURE — 96365 THER/PROPH/DIAG IV INF INIT: CPT

## 2023-06-07 PROCEDURE — 96375 TX/PRO/DX INJ NEW DRUG ADDON: CPT

## 2023-06-07 PROCEDURE — A4216 STERILE WATER/SALINE, 10 ML: HCPCS | Performed by: HOSPITALIST

## 2023-06-07 RX ORDER — SODIUM CHLORIDE 0.9 % (FLUSH) 0.9 %
10 SYRINGE (ML) INJECTION
Status: DISCONTINUED | OUTPATIENT
Start: 2023-06-07 | End: 2023-09-27

## 2023-06-07 RX ORDER — SODIUM CHLORIDE 0.9 % (FLUSH) 0.9 %
10 SYRINGE (ML) INJECTION
Status: CANCELLED | OUTPATIENT
Start: 2023-06-07

## 2023-06-07 RX ORDER — HEPARIN 100 UNIT/ML
500 SYRINGE INTRAVENOUS
Status: CANCELLED | OUTPATIENT
Start: 2023-06-07

## 2023-06-07 RX ORDER — HEPARIN 100 UNIT/ML
500 SYRINGE INTRAVENOUS
Status: DISCONTINUED | OUTPATIENT
Start: 2023-06-07 | End: 2023-09-27

## 2023-06-07 RX ADMIN — Medication 20 ML: at 12:06

## 2023-06-07 RX ADMIN — Medication 10 ML: at 11:06

## 2023-06-07 RX ADMIN — HEPARIN 500 UNITS: 100 SYRINGE at 12:06

## 2023-06-07 RX ADMIN — ERTAPENEM 1 G: 1 INJECTION INTRAMUSCULAR; INTRAVENOUS at 11:06

## 2023-06-08 ENCOUNTER — INFUSION (OUTPATIENT)
Dept: INFUSION THERAPY | Facility: HOSPITAL | Age: 58
End: 2023-06-08
Attending: HOSPITALIST
Payer: MEDICARE

## 2023-06-08 VITALS
SYSTOLIC BLOOD PRESSURE: 101 MMHG | HEIGHT: 68 IN | RESPIRATION RATE: 18 BRPM | BODY MASS INDEX: 33.65 KG/M2 | HEART RATE: 75 BPM | TEMPERATURE: 98 F | WEIGHT: 222 LBS | OXYGEN SATURATION: 98 % | DIASTOLIC BLOOD PRESSURE: 68 MMHG

## 2023-06-08 DIAGNOSIS — M86.9 BONE INFECTION: Primary | ICD-10-CM

## 2023-06-08 PROCEDURE — A4216 STERILE WATER/SALINE, 10 ML: HCPCS | Performed by: HOSPITALIST

## 2023-06-08 PROCEDURE — 25000003 PHARM REV CODE 250: Performed by: HOSPITALIST

## 2023-06-08 PROCEDURE — 96375 TX/PRO/DX INJ NEW DRUG ADDON: CPT

## 2023-06-08 PROCEDURE — 96365 THER/PROPH/DIAG IV INF INIT: CPT

## 2023-06-08 PROCEDURE — 63600175 PHARM REV CODE 636 W HCPCS: Performed by: HOSPITALIST

## 2023-06-08 RX ORDER — HEPARIN 100 UNIT/ML
500 SYRINGE INTRAVENOUS
Status: DISCONTINUED | OUTPATIENT
Start: 2023-06-08 | End: 2023-09-27

## 2023-06-08 RX ORDER — SODIUM CHLORIDE 0.9 % (FLUSH) 0.9 %
10 SYRINGE (ML) INJECTION
Status: CANCELLED | OUTPATIENT
Start: 2023-06-08

## 2023-06-08 RX ORDER — HEPARIN 100 UNIT/ML
500 SYRINGE INTRAVENOUS
Status: CANCELLED | OUTPATIENT
Start: 2023-06-08

## 2023-06-08 RX ORDER — SODIUM CHLORIDE 0.9 % (FLUSH) 0.9 %
10 SYRINGE (ML) INJECTION
Status: DISCONTINUED | OUTPATIENT
Start: 2023-06-08 | End: 2023-09-27

## 2023-06-08 RX ADMIN — ERTAPENEM 1 G: 1 INJECTION INTRAMUSCULAR; INTRAVENOUS at 11:06

## 2023-06-08 RX ADMIN — HEPARIN 500 UNITS: 100 SYRINGE at 11:06

## 2023-06-08 RX ADMIN — Medication 20 ML: at 11:06

## 2023-06-08 RX ADMIN — Medication 10 ML: at 11:06

## 2023-06-10 ENCOUNTER — INFUSION (OUTPATIENT)
Dept: INFUSION THERAPY | Facility: HOSPITAL | Age: 58
End: 2023-06-10
Attending: HOSPITALIST
Payer: MEDICARE

## 2023-06-10 PROCEDURE — 63600175 PHARM REV CODE 636 W HCPCS

## 2023-06-10 PROCEDURE — 25000003 PHARM REV CODE 250

## 2023-06-10 RX ORDER — SODIUM CHLORIDE 0.9 % (FLUSH) 0.9 %
10 SYRINGE (ML) INJECTION
Status: CANCELLED | OUTPATIENT
Start: 2023-06-10

## 2023-06-10 RX ORDER — HEPARIN 100 UNIT/ML
500 SYRINGE INTRAVENOUS
Status: CANCELLED | OUTPATIENT
Start: 2023-06-10

## 2023-06-11 ENCOUNTER — INFUSION (OUTPATIENT)
Dept: INFUSION THERAPY | Facility: HOSPITAL | Age: 58
End: 2023-06-11
Attending: HOSPITALIST
Payer: MEDICARE

## 2023-06-11 PROCEDURE — 25000003 PHARM REV CODE 250

## 2023-06-11 PROCEDURE — 63600175 PHARM REV CODE 636 W HCPCS

## 2023-06-11 RX ORDER — HEPARIN 100 UNIT/ML
500 SYRINGE INTRAVENOUS
Status: CANCELLED | OUTPATIENT
Start: 2023-06-11

## 2023-06-11 RX ORDER — SODIUM CHLORIDE 0.9 % (FLUSH) 0.9 %
10 SYRINGE (ML) INJECTION
Status: CANCELLED | OUTPATIENT
Start: 2023-06-11

## 2023-06-11 NOTE — PROGRESS NOTES
Pt arrived at 1120. Pt placed in outpatient surgery rm 1. VS obtained. /76, , RR 16, SPO2 95%, TEMP 97.9. Lab [facility] patients lab on pt right wrist. Midline to left upper arm flushed with NS. 1 gram Ertapenum in 100 ml of NS 0.9% started at 1145 and ended at 1215. Midline flushed with NS and then 500units of Heparin. Pt. DC.

## 2023-06-12 ENCOUNTER — INFUSION (OUTPATIENT)
Dept: INFUSION THERAPY | Facility: HOSPITAL | Age: 58
End: 2023-06-12
Attending: HOSPITALIST
Payer: MEDICARE

## 2023-06-12 VITALS
WEIGHT: 222 LBS | OXYGEN SATURATION: 98 % | HEART RATE: 98 BPM | BODY MASS INDEX: 33.65 KG/M2 | RESPIRATION RATE: 18 BRPM | SYSTOLIC BLOOD PRESSURE: 96 MMHG | TEMPERATURE: 98 F | HEIGHT: 68 IN | DIASTOLIC BLOOD PRESSURE: 63 MMHG

## 2023-06-12 DIAGNOSIS — M86.9 BONE INFECTION: Primary | ICD-10-CM

## 2023-06-12 PROCEDURE — 63600175 PHARM REV CODE 636 W HCPCS: Performed by: HOSPITALIST

## 2023-06-12 PROCEDURE — A4216 STERILE WATER/SALINE, 10 ML: HCPCS | Performed by: HOSPITALIST

## 2023-06-12 PROCEDURE — 96365 THER/PROPH/DIAG IV INF INIT: CPT

## 2023-06-12 PROCEDURE — 96375 TX/PRO/DX INJ NEW DRUG ADDON: CPT

## 2023-06-12 PROCEDURE — 25000003 PHARM REV CODE 250: Performed by: HOSPITALIST

## 2023-06-12 RX ORDER — SODIUM CHLORIDE 0.9 % (FLUSH) 0.9 %
10 SYRINGE (ML) INJECTION
Status: CANCELLED | OUTPATIENT
Start: 2023-06-12

## 2023-06-12 RX ORDER — HEPARIN 100 UNIT/ML
500 SYRINGE INTRAVENOUS
Status: DISCONTINUED | OUTPATIENT
Start: 2023-06-12 | End: 2023-09-27

## 2023-06-12 RX ORDER — HEPARIN 100 UNIT/ML
500 SYRINGE INTRAVENOUS
Status: CANCELLED | OUTPATIENT
Start: 2023-06-12

## 2023-06-12 RX ORDER — SODIUM CHLORIDE 0.9 % (FLUSH) 0.9 %
10 SYRINGE (ML) INJECTION
Status: DISCONTINUED | OUTPATIENT
Start: 2023-06-12 | End: 2023-09-27

## 2023-06-12 RX ADMIN — ERTAPENEM 1 G: 1 INJECTION INTRAMUSCULAR; INTRAVENOUS at 12:06

## 2023-06-12 RX ADMIN — Medication 10 ML: at 12:06

## 2023-06-12 RX ADMIN — HEPARIN 500 UNITS: 100 SYRINGE at 12:06

## 2023-06-12 RX ADMIN — Medication 30 ML: at 12:06

## 2023-06-13 ENCOUNTER — HOSPITAL ENCOUNTER (OUTPATIENT)
Dept: WOUND CARE | Facility: HOSPITAL | Age: 58
Discharge: HOME | End: 2023-06-13
Attending: NURSE PRACTITIONER
Payer: MEDICARE

## 2023-06-13 ENCOUNTER — INFUSION (OUTPATIENT)
Dept: INFUSION THERAPY | Facility: HOSPITAL | Age: 58
End: 2023-06-13
Attending: HOSPITALIST
Payer: MEDICARE

## 2023-06-13 VITALS — RESPIRATION RATE: 16 BRPM | HEART RATE: 99 BPM | OXYGEN SATURATION: 98 % | TEMPERATURE: 98 F

## 2023-06-13 VITALS
WEIGHT: 222 LBS | DIASTOLIC BLOOD PRESSURE: 62 MMHG | SYSTOLIC BLOOD PRESSURE: 94 MMHG | HEIGHT: 68 IN | RESPIRATION RATE: 18 BRPM | HEART RATE: 83 BPM | BODY MASS INDEX: 33.65 KG/M2 | OXYGEN SATURATION: 98 % | TEMPERATURE: 99 F

## 2023-06-13 DIAGNOSIS — M86.9 BONE INFECTION: Primary | ICD-10-CM

## 2023-06-13 DIAGNOSIS — L89.613 PRESSURE INJURY OF RIGHT HEEL, STAGE 3: ICD-10-CM

## 2023-06-13 DIAGNOSIS — L89.314 PRESSURE INJURY OF RIGHT ISCHIUM, STAGE 4: ICD-10-CM

## 2023-06-13 DIAGNOSIS — L89.513 PRESSURE INJURY OF RIGHT ANKLE, STAGE 3: ICD-10-CM

## 2023-06-13 DIAGNOSIS — L89.44 PRESSURE INJURY OF CONTIGUOUS REGION INVOLVING BACK AND BUTTOCK, STAGE 4, UNSPECIFIED LATERALITY: ICD-10-CM

## 2023-06-13 DIAGNOSIS — L89.224 PRESSURE INJURY OF TROCHANTERIC REGION OF LEFT HIP, STAGE 4: ICD-10-CM

## 2023-06-13 PROCEDURE — 96375 TX/PRO/DX INJ NEW DRUG ADDON: CPT

## 2023-06-13 PROCEDURE — 97606 NEG PRS WND THER DME>50 SQCM: CPT

## 2023-06-13 PROCEDURE — 63600175 PHARM REV CODE 636 W HCPCS: Performed by: HOSPITALIST

## 2023-06-13 PROCEDURE — 99213 PR OFFICE/OUTPT VISIT, EST, LEVL III, 20-29 MIN: ICD-10-PCS | Mod: ,,, | Performed by: PEDIATRICS

## 2023-06-13 PROCEDURE — A4216 STERILE WATER/SALINE, 10 ML: HCPCS | Performed by: HOSPITALIST

## 2023-06-13 PROCEDURE — 99215 OFFICE O/P EST HI 40 MIN: CPT | Mod: 25

## 2023-06-13 PROCEDURE — 25000003 PHARM REV CODE 250: Performed by: HOSPITALIST

## 2023-06-13 PROCEDURE — 96365 THER/PROPH/DIAG IV INF INIT: CPT

## 2023-06-13 PROCEDURE — 99213 OFFICE O/P EST LOW 20 MIN: CPT | Mod: ,,, | Performed by: PEDIATRICS

## 2023-06-13 RX ORDER — SODIUM CHLORIDE 0.9 % (FLUSH) 0.9 %
10 SYRINGE (ML) INJECTION
Status: DISCONTINUED | OUTPATIENT
Start: 2023-06-13 | End: 2023-09-27

## 2023-06-13 RX ORDER — SODIUM CHLORIDE 0.9 % (FLUSH) 0.9 %
10 SYRINGE (ML) INJECTION
Status: CANCELLED | OUTPATIENT
Start: 2023-06-13

## 2023-06-13 RX ORDER — HEPARIN 100 UNIT/ML
500 SYRINGE INTRAVENOUS
Status: DISCONTINUED | OUTPATIENT
Start: 2023-06-13 | End: 2023-09-27

## 2023-06-13 RX ORDER — HEPARIN 100 UNIT/ML
500 SYRINGE INTRAVENOUS
Status: CANCELLED | OUTPATIENT
Start: 2023-06-13

## 2023-06-13 RX ADMIN — Medication 10 ML: at 11:06

## 2023-06-13 RX ADMIN — Medication 10 ML: at 12:06

## 2023-06-13 RX ADMIN — HEPARIN 500 UNITS: 100 SYRINGE at 12:06

## 2023-06-13 RX ADMIN — ERTAPENEM 1 G: 1 INJECTION INTRAMUSCULAR; INTRAVENOUS at 12:06

## 2023-06-13 NOTE — PATIENT INSTRUCTIONS
Next Appointment in wound care center is Friday 6/16/23 at 11:30AM    Cleanse wounds with: NS, soap and water, or wound cleanser  Periwound care: Skin prep; Hydrocolloid border to periwound of wound vac sites (L trochanter and R ischium)  Primary dressing: Crushed Metronidazole/Santyl/Black Granufoam to L trochanter. Adaptic to medial aspect of R ischium wound bed base then crushed Metronidazole/Santyl/Black Granufoam to R ischium. Aquacel Ag or Opticell Ag to R heel and Sacrum  Secondary dressing: Wound vac drape to L trochanter and R ischium. Gauze, abd pad, kerlix to R anterior ankle and R heel. Gauze, abd pad, secure with retention tape to Sacrum  Offloading: Offload as much as possible  Edema control: Elevation; Tubigrip F to RLE   Frequency: Tues/Fri dressing changes in WOUND CARE CENTER for L trochanter and R ischium; Change Sacrum every other day; Change R heel every 3 days   Follow-up: Tues/Fri in wound care center  Other Orders: Wound vac changes to only be performed in wound care center, not by Home Health. May reinforce wound vac drape as needed. Home Health okay to change Sacrum and RLE wounds on Home Health days          Watch wound vac tubing for bleeding.  If large amount of blood or persistant amount of drainage occurs.   Remove vac and apply dry pressure dressing. Notify wound care center.  If unable to stop bleeding and on a night or weekend, go to the nearest emergency room for evaluation.        Discharge Instructions regarding Wound Vac     Wound Vac: You should have appointments scheduled 2-3 times a week to provide wound care. During these appointments, your Wound Vac will be changed. Please bring the supplies that were provided with the Wound Vac to these appointments.      Should your Wound Vac begin to alarm, or you begin to have problems related to the Wound Vac itself, the Wound Vac company has 24-hour availability. [facility] is able to provide troubleshooting support for all V.A.C.® Therapy  devices:       Helps provide on-the-spot problem identification and resolution or deals with service requests       Convenient telephone access for all care situations      To access this service, please contact [facility] via [phone].      If unable to troubleshoot or reach [facility] after 2 hours, remove dressing and apply wet to dry dressing over wound bed.   Follow up as scheduled in Wound Care Clinic. If you have any additional questions, contact us at [phone]

## 2023-06-13 NOTE — PROGRESS NOTES
Subjective:       Patient ID: Antonio Galvan II is a 57 y.o. male.    Chief Complaint: Pressure Ulcer (Pressure ulcers to R heel, R ischium, L trochanter and sacrum. NPWT on L trochanter. MD visit and re-assessment )    HPI  Review of Systems      Objective:      Temp:  [98.4 °F (36.9 °C)-98.6 °F (37 °C)]   Pulse:  [83-99]   Resp:  [16-18]   BP: (94)/(62)   SpO2:  [98 %]   Physical Exam       Negative Pressure Wound Therapy  03/20/23 1500 Right (Active)   03/20/23 1500   Side: Right   Orientation:    Location: Ischial tuberosity   Additional Comments:    Removal Indication and Assessment:    Location:    SDO Location:    NPWT Type Vacuum Therapy 06/13/23 1438   Therapy Setting NPWT Continuous therapy 06/13/23 1438   Pressure Setting NPWT 125 mmHg 06/13/23 1438   Therapy Interventions NPWT Dressing changed;Seal intact 06/13/23 1438   Sponges Inserted NPWT Black;2 06/13/23 1438   Sponges Removed NPWT Black;2 06/13/23 1438            Negative Pressure Wound Therapy  05/15/23 1344 Left (Active)   05/15/23 1344   Side: Left   Orientation:    Location: Hip   Additional Comments:    Removal Indication and Assessment:    Location:    SDO Location:    NPWT Type Vacuum Therapy 06/13/23 1438   Therapy Setting NPWT Continuous therapy 06/13/23 1438   Pressure Setting NPWT 125 mmHg 06/13/23 1438   Therapy Interventions NPWT Dressing changed;Seal intact 06/13/23 1438   Sponges Inserted NPWT Black;2 06/13/23 1438   Sponges Removed NPWT Black;2 06/13/23 1438            Altered Skin Integrity 05/06/22 1140 Right Heel  Full thickness tissue loss. Subcutaneous fat may be visible but bone, tendon or muscle are not exposed (Active)   05/06/22 1140   Altered Skin Integrity Present on Admission - Did Patient arrive to the hospital with altered skin?: yes   Side: Right   Orientation:    Location: Heel   Wound Number:    Is this injury device related?: No   Primary Wound Type:    Description of Altered Skin Integrity: Full thickness  tissue loss. Subcutaneous fat may be visible but bone, tendon or muscle are not exposed   Ankle-Brachial Index:    Pulses:    Removal Indication and Assessment:    Wound Outcome:    (Retired) Wound Length (cm):    (Retired) Wound Width (cm):    (Retired) Depth (cm):    Wound Description (Comments):    Removal Indications:    Wound Image   06/13/23 1438   Dressing Appearance Intact;Dried drainage 06/13/23 1438   Drainage Amount Moderate 06/13/23 1438   Drainage Characteristics/Odor Serosanguineous 06/13/23 1438   Appearance Red;Tan;Granulating 06/13/23 1438   Tissue loss description Full thickness 06/13/23 1438   Periwound Area Ecchymotic;Scar tissue 06/13/23 1438   Wound Edges Defined 06/13/23 1438   Wound Length (cm) 2.8 cm 06/13/23 1438   Wound Width (cm) 1.7 cm 06/13/23 1438   Wound Depth (cm) 0.1 cm 06/13/23 1438   Wound Volume (cm^3) 0.476 cm^3 06/13/23 1438   Wound Surface Area (cm^2) 4.76 cm^2 06/13/23 1438   Care Cleansed with:;Sterile normal saline 06/13/23 1438   Dressing Applied;Hydrofiber;Silver;Gauze;Absorptive Pad;Rolled gauze 06/13/23 1438   Periwound Care Skin barrier film applied 06/13/23 1438   Compression Tubular elasticized bandage 06/13/23 1438            Altered Skin Integrity 01/12/23 1530 Sacral spine Full thickness tissue loss with exposed bone, tendon, or muscle. Often includes undermining and tunneling. May extend into muscle and/or supporting structures. (Active)   01/12/23 1530   Altered Skin Integrity Present on Admission - Did Patient arrive to the hospital with altered skin?: yes   Side:    Orientation:    Location: Sacral spine   Wound Number:    Is this injury device related?:    Primary Wound Type:    Description of Altered Skin Integrity: Full thickness tissue loss with exposed bone, tendon, or muscle. Often includes undermining and tunneling. May extend into muscle and/or supporting structures.   Ankle-Brachial Index:    Pulses:    Removal Indication and Assessment:    Wound  Outcome:    (Retired) Wound Length (cm):    (Retired) Wound Width (cm):    (Retired) Depth (cm):    Wound Description (Comments):    Removal Indications:    Wound Image    06/13/23 1438   Description of Altered Skin Integrity Full thickness tissue loss with exposed bone, tendon, or muscle. Often includes undermining and tunneling. May extend into muscle and/or supporting structures. 06/13/23 1438   Dressing Appearance Intact;Moist drainage 06/13/23 1438   Drainage Amount Moderate 06/13/23 1438   Drainage Characteristics/Odor Serosanguineous 06/13/23 1438   Appearance Pink;Tan;Red;Fibrin 06/13/23 1438   Tissue loss description Full thickness 06/13/23 1438   Periwound Area Denuded 06/13/23 1438   Wound Edges Irregular 06/13/23 1438   Wound Length (cm) 3.2 cm 06/13/23 1438   Wound Width (cm) 0.6 cm 06/13/23 1438   Wound Depth (cm) 0.1 cm 06/13/23 1438   Wound Volume (cm^3) 0.192 cm^3 06/13/23 1438   Wound Surface Area (cm^2) 1.92 cm^2 06/13/23 1438   Care Cleansed with:;Sterile normal saline 06/13/23 1438   Dressing Applied;Hydrofiber;Silver;Gauze;Absorptive Pad 06/13/23 1438   Periwound Care Skin barrier film applied 06/13/23 1438            Altered Skin Integrity 01/12/23 1532 Right Ischial tuberosity Full thickness tissue loss with exposed bone, tendon, or muscle. Often includes undermining and tunneling. May extend into muscle and/or supporting structures. (Active)   01/12/23 1532   Altered Skin Integrity Present on Admission - Did Patient arrive to the hospital with altered skin?: yes   Side: Right   Orientation:    Location: Ischial tuberosity   Wound Number:    Is this injury device related?:    Primary Wound Type:    Description of Altered Skin Integrity: Full thickness tissue loss with exposed bone, tendon, or muscle. Often includes undermining and tunneling. May extend into muscle and/or supporting structures.   Ankle-Brachial Index:    Pulses:    Removal Indication and Assessment:    Wound Outcome:     (Retired) Wound Length (cm):    (Retired) Wound Width (cm):    (Retired) Depth (cm):    Wound Description (Comments):    Removal Indications:    Wound Image   06/13/23 1438   Description of Altered Skin Integrity Full thickness tissue loss with exposed bone, tendon, or muscle. Often includes undermining and tunneling. May extend into muscle and/or supporting structures. 06/13/23 1438   Dressing Appearance Intact;Moist drainage 06/13/23 1438   Drainage Amount Moderate 06/13/23 1438   Drainage Characteristics/Odor Serosanguineous 06/13/23 1438   Appearance Pink;Red;Tan;Necrotic;Muscle;Granulating 06/13/23 1438   Tissue loss description Full thickness 06/13/23 1438   Red (%), Wound Tissue Color 60 % 06/13/23 1438   Yellow (%), Wound Tissue Color 40 % 06/13/23 1438   Periwound Area Denuded;Blistered;Moist;Scar tissue 06/13/23 1438   Wound Edges Irregular 06/13/23 1438   Wound Length (cm) 12 cm 06/13/23 1438   Wound Width (cm) 115 cm 06/13/23 1438   Wound Depth (cm) 2.8 cm 06/13/23 1438   Wound Volume (cm^3) 3864 cm^3 06/13/23 1438   Wound Surface Area (cm^2) 1380 cm^2 06/13/23 1438   Care Cleansed with:;Sterile normal saline 06/13/23 1438   Dressing Applied;Other (comment);Transparent film;Non-adherent 06/13/23 1438   Periwound Care Skin barrier film applied 06/13/23 1438            Altered Skin Integrity 02/09/23 1324 Right anterior Ankle Ulceration Full thickness tissue loss. Subcutaneous fat may be visible but bone, tendon or muscle are not exposed (Active)   02/09/23 1324   Altered Skin Integrity Present on Admission - Did Patient arrive to the hospital with altered skin?: yes   Side: Right   Orientation: anterior   Location: Ankle   Wound Number:    Is this injury device related?:    Primary Wound Type: Ulceration   Description of Altered Skin Integrity: Full thickness tissue loss. Subcutaneous fat may be visible but bone, tendon or muscle are not exposed   Ankle-Brachial Index:    Pulses:    Removal Indication  and Assessment:    Wound Outcome:    (Retired) Wound Length (cm):    (Retired) Wound Width (cm):    (Retired) Depth (cm):    Wound Description (Comments):    Removal Indications:    Wound Image   06/13/23 1438   Dressing Appearance Intact;Dry 06/13/23 1438   Drainage Amount None 06/13/23 1438   Appearance Dry;Other (see comments) 06/13/23 1438   Periwound Area Scar tissue 06/13/23 1438   Wound Edges Defined 06/13/23 1438   Wound Length (cm) 0.5 cm 06/13/23 1438   Wound Width (cm) 0.7 cm 06/13/23 1438   Wound Surface Area (cm^2) 0.35 cm^2 06/13/23 1438   Care Cleansed with:;Sterile normal saline 06/13/23 1438   Dressing Applied;Bandaid 06/13/23 1438   Compression Tubular elasticized bandage 06/13/23 1438         Assessment:     Patient here today for evaluation of wounds and wound VAC change.  Patient was in emergency room 4 days ago for increased bleeding from VAC site.  Bleeding was stopped with pressure and VAC was not restarted.  Two days ago hemoglobin was 9.1.  Sed rate and CRP is also more elevated than normal.  Today patient arrives and injury of right ischium is not bleeding.  There is some better granulation tissue.  Does not appear to be infected.  Because there is no decided to restart the VAC at this time.  Sacral region his more granulated at this time Aquacel was applied to this area.  Right heel is improving also.  This is granulating.  Aquacel Ag was applied to this area and bandage.  Right ankle wound again is granulating.  Patient will offload as much as possible.  Patient will have VAC changed only in the wound care.  Home health is not to changed the VAC.  Patient will be seen Friday in the wound care clinic and will return in 1 week for physician visit.    ICD-10-CM ICD-9-CM   1. 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 Pressure injury of right heel, stage 3  L89.613 707.07     707.23   3. Pressure injury of contiguous region involving back and  buttock, stage 4, unspecified laterality  L89.44 707.09     707.24   4. Pressure injury of right ischium, stage 4  L89.314 707.05     707.24   5. Pressure injury of right ankle, stage 3  L89.513 707.06     707.23         Plan:   Tissue pathology and/or culture taken:  [] Yes [x] No   Sharp debridement performed:   [] Yes [x] No   Labs ordered this visit:   [] Yes [x] No   Imaging ordered this visit:   [] Yes [x] No           Orders Placed This Encounter   Procedures    Change dressing     Cleanse wounds with: NS, soap and water, or wound cleanser  Periwound care: Skin prep; Hydrocolloid border to periwound of wound vac sites (L trochanter and R ischium)  Primary dressing: Crushed Metronidazole/Santyl/Black Granufoam to L trochanter. Adaptic to medial aspect of wound bed base then crushed Metronidazole/Santyl/Black Granufoam toR iscium. Aquacel or Opticell Ag to R heel and Sacrum  Secondary dressing: Wound vac drape to L trochanter and R ischium. Gauze, abd pad, kerlix to R anterior ankle and R heel. Gauze, abd pad, secure with retention tape to Sacrum  Offloading: Offload as much as possible  Edema control: Elevation; Tubigrip F to RLE   Frequency: Tues/Fri dressing changes in WOUND CARE CENTER for L trochanter and R ischium; Change Sacrum every other day; Change R heel every 3 days   Follow-up: Tues/Fri in wound care center  Other Orders: Wound vac changes to only be performed in wound care center, not by Home Health. May reinforce wound vac drape as needed. Home Health okay to change Sacrum and RLE wounds on Home Health days     Standing Status:   Standing     Number of Occurrences:   20     Standing Expiration Date:   8/13/2023        Follow up in about 3 days (around 6/16/2023) for Nurse visit Friday and MD visit in 1 week .

## 2023-06-14 ENCOUNTER — INFUSION (OUTPATIENT)
Dept: INFUSION THERAPY | Facility: HOSPITAL | Age: 58
End: 2023-06-14
Attending: HOSPITALIST
Payer: MEDICARE

## 2023-06-14 DIAGNOSIS — M86.9 BONE INFECTION: Primary | ICD-10-CM

## 2023-06-14 PROCEDURE — 96375 TX/PRO/DX INJ NEW DRUG ADDON: CPT

## 2023-06-14 PROCEDURE — 63600175 PHARM REV CODE 636 W HCPCS: Performed by: HOSPITALIST

## 2023-06-14 PROCEDURE — A4216 STERILE WATER/SALINE, 10 ML: HCPCS | Performed by: HOSPITALIST

## 2023-06-14 PROCEDURE — 25000003 PHARM REV CODE 250: Performed by: HOSPITALIST

## 2023-06-14 PROCEDURE — 96365 THER/PROPH/DIAG IV INF INIT: CPT

## 2023-06-14 RX ORDER — HEPARIN 100 UNIT/ML
500 SYRINGE INTRAVENOUS
Status: DISCONTINUED | OUTPATIENT
Start: 2023-06-14 | End: 2023-09-27

## 2023-06-14 RX ORDER — HEPARIN 100 UNIT/ML
500 SYRINGE INTRAVENOUS
Status: CANCELLED | OUTPATIENT
Start: 2023-06-14

## 2023-06-14 RX ORDER — SODIUM CHLORIDE 0.9 % (FLUSH) 0.9 %
10 SYRINGE (ML) INJECTION
Status: DISCONTINUED | OUTPATIENT
Start: 2023-06-14 | End: 2023-09-27

## 2023-06-14 RX ORDER — SODIUM CHLORIDE 0.9 % (FLUSH) 0.9 %
10 SYRINGE (ML) INJECTION
Status: CANCELLED | OUTPATIENT
Start: 2023-06-14

## 2023-06-14 RX ADMIN — Medication 10 ML: at 11:06

## 2023-06-14 RX ADMIN — Medication 20 ML: at 12:06

## 2023-06-14 RX ADMIN — HEPARIN 500 UNITS: 100 SYRINGE at 12:06

## 2023-06-14 RX ADMIN — ERTAPENEM 1 G: 1 INJECTION INTRAMUSCULAR; INTRAVENOUS at 11:06

## 2023-06-14 NOTE — ADDENDUM NOTE
Encounter addended by: Luz Madrid RN on: 6/14/2023 11:41 AM   Actions taken: Actions taken from a [employer] Advisory, SmartForm saved, Service provider recipient added

## 2023-06-15 ENCOUNTER — INFUSION (OUTPATIENT)
Dept: INFUSION THERAPY | Facility: HOSPITAL | Age: 58
End: 2023-06-15
Attending: HOSPITALIST
Payer: MEDICARE

## 2023-06-15 VITALS
HEIGHT: 68 IN | RESPIRATION RATE: 18 BRPM | OXYGEN SATURATION: 98 % | TEMPERATURE: 98 F | SYSTOLIC BLOOD PRESSURE: 94 MMHG | BODY MASS INDEX: 33.65 KG/M2 | WEIGHT: 222 LBS | HEART RATE: 91 BPM | DIASTOLIC BLOOD PRESSURE: 61 MMHG

## 2023-06-15 DIAGNOSIS — M86.9 BONE INFECTION: Primary | ICD-10-CM

## 2023-06-15 LAB
ALBUMIN SERPL-MCNC: 2.3 G/DL (ref 3.5–5)
ALBUMIN/GLOB SERPL: 0.7 RATIO (ref 1.1–2)
ALP SERPL-CCNC: 97 UNIT/L (ref 40–150)
ALT SERPL-CCNC: 10 UNIT/L (ref 0–55)
AST SERPL-CCNC: 6 UNIT/L (ref 5–34)
BASOPHILS # BLD AUTO: 0.02 X10(3)/MCL
BASOPHILS NFR BLD AUTO: 0.4 %
BILIRUBIN DIRECT+TOT PNL SERPL-MCNC: 0.2 MG/DL
BUN SERPL-MCNC: 27.8 MG/DL (ref 8.4–25.7)
CALCIUM SERPL-MCNC: 8.1 MG/DL (ref 8.4–10.2)
CHLORIDE SERPL-SCNC: 106 MMOL/L (ref 98–107)
CO2 SERPL-SCNC: 26 MMOL/L (ref 22–29)
CREAT SERPL-MCNC: 1.14 MG/DL (ref 0.73–1.18)
CRP SERPL-MCNC: 6.5 MG/L
EOSINOPHIL # BLD AUTO: 0.24 X10(3)/MCL (ref 0–0.9)
EOSINOPHIL NFR BLD AUTO: 4.7 %
ERYTHROCYTE [DISTWIDTH] IN BLOOD BY AUTOMATED COUNT: 22.7 % (ref 11.5–17)
ERYTHROCYTE [SEDIMENTATION RATE] IN BLOOD: 126 MM/HR (ref 0–15)
GFR SERPLBLD CREATININE-BSD FMLA CKD-EPI: >60 MLS/MIN/1.73/M2
GLOBULIN SER-MCNC: 3.3 GM/DL (ref 2.4–3.5)
GLUCOSE SERPL-MCNC: 117 MG/DL (ref 74–100)
HCT VFR BLD AUTO: 23.4 % (ref 42–52)
HGB BLD-MCNC: 7 G/DL (ref 14–18)
IMM GRANULOCYTES # BLD AUTO: 0.01 X10(3)/MCL (ref 0–0.04)
IMM GRANULOCYTES NFR BLD AUTO: 0.2 %
LYMPHOCYTES # BLD AUTO: 1.32 X10(3)/MCL (ref 0.6–4.6)
LYMPHOCYTES NFR BLD AUTO: 25.7 %
MCH RBC QN AUTO: 24.5 PG (ref 27–31)
MCHC RBC AUTO-ENTMCNC: 29.9 G/DL (ref 33–36)
MCV RBC AUTO: 81.8 FL (ref 80–94)
MONOCYTES # BLD AUTO: 0.38 X10(3)/MCL (ref 0.1–1.3)
MONOCYTES NFR BLD AUTO: 7.4 %
NEUTROPHILS # BLD AUTO: 3.17 X10(3)/MCL (ref 2.1–9.2)
NEUTROPHILS NFR BLD AUTO: 61.6 %
PLATELET # BLD AUTO: 244 X10(3)/MCL (ref 130–400)
PMV BLD AUTO: 10.7 FL (ref 7.4–10.4)
POTASSIUM SERPL-SCNC: 4.2 MMOL/L (ref 3.5–5.1)
PROT SERPL-MCNC: 5.6 GM/DL (ref 6.4–8.3)
RBC # BLD AUTO: 2.86 X10(6)/MCL (ref 4.7–6.1)
SODIUM SERPL-SCNC: 142 MMOL/L (ref 136–145)
WBC # SPEC AUTO: 5.14 X10(3)/MCL (ref 4.5–11.5)

## 2023-06-15 PROCEDURE — 25000003 PHARM REV CODE 250: Performed by: HOSPITALIST

## 2023-06-15 PROCEDURE — 85652 RBC SED RATE AUTOMATED: CPT

## 2023-06-15 PROCEDURE — 36415 COLL VENOUS BLD VENIPUNCTURE: CPT | Mod: 91

## 2023-06-15 PROCEDURE — 85025 COMPLETE CBC W/AUTO DIFF WBC: CPT

## 2023-06-15 PROCEDURE — 36415 COLL VENOUS BLD VENIPUNCTURE: CPT

## 2023-06-15 PROCEDURE — 80053 COMPREHEN METABOLIC PANEL: CPT

## 2023-06-15 PROCEDURE — 63600175 PHARM REV CODE 636 W HCPCS: Performed by: HOSPITALIST

## 2023-06-15 PROCEDURE — 96375 TX/PRO/DX INJ NEW DRUG ADDON: CPT

## 2023-06-15 PROCEDURE — A4216 STERILE WATER/SALINE, 10 ML: HCPCS | Performed by: HOSPITALIST

## 2023-06-15 PROCEDURE — 86140 C-REACTIVE PROTEIN: CPT

## 2023-06-15 PROCEDURE — 96365 THER/PROPH/DIAG IV INF INIT: CPT

## 2023-06-15 RX ORDER — HEPARIN 100 UNIT/ML
500 SYRINGE INTRAVENOUS
Status: CANCELLED | OUTPATIENT
Start: 2023-06-15

## 2023-06-15 RX ORDER — SODIUM CHLORIDE 0.9 % (FLUSH) 0.9 %
10 SYRINGE (ML) INJECTION
Status: DISCONTINUED | OUTPATIENT
Start: 2023-06-15 | End: 2023-09-27

## 2023-06-15 RX ORDER — SODIUM CHLORIDE 0.9 % (FLUSH) 0.9 %
10 SYRINGE (ML) INJECTION
Status: CANCELLED | OUTPATIENT
Start: 2023-06-15

## 2023-06-15 RX ORDER — HEPARIN 100 UNIT/ML
500 SYRINGE INTRAVENOUS
Status: DISCONTINUED | OUTPATIENT
Start: 2023-06-15 | End: 2023-09-27

## 2023-06-15 RX ADMIN — Medication 10 ML: at 12:06

## 2023-06-15 RX ADMIN — HEPARIN 500 UNITS: 100 SYRINGE at 12:06

## 2023-06-15 RX ADMIN — Medication 10 ML: at 11:06

## 2023-06-15 RX ADMIN — ERTAPENEM 1 G: 1 INJECTION INTRAMUSCULAR; INTRAVENOUS at 11:06

## 2023-06-15 NOTE — ADDENDUM NOTE
Encounter addended by: Nguyen Pacheco RN on: 6/15/2023 12:58 PM   Actions taken: Actions taken from a BestPractice Advisory

## 2023-06-16 ENCOUNTER — INFUSION (OUTPATIENT)
Dept: INFUSION THERAPY | Facility: HOSPITAL | Age: 58
End: 2023-06-16
Attending: HOSPITALIST
Payer: MEDICARE

## 2023-06-16 ENCOUNTER — HOSPITAL ENCOUNTER (OUTPATIENT)
Dept: WOUND CARE | Facility: HOSPITAL | Age: 58
Discharge: HOME | End: 2023-06-16
Attending: NURSE PRACTITIONER
Payer: MEDICARE

## 2023-06-16 VITALS — HEART RATE: 67 BPM | OXYGEN SATURATION: 97 % | RESPIRATION RATE: 18 BRPM | TEMPERATURE: 98 F

## 2023-06-16 VITALS
OXYGEN SATURATION: 97 % | WEIGHT: 222 LBS | HEART RATE: 87 BPM | HEIGHT: 68 IN | BODY MASS INDEX: 33.65 KG/M2 | DIASTOLIC BLOOD PRESSURE: 60 MMHG | SYSTOLIC BLOOD PRESSURE: 90 MMHG | RESPIRATION RATE: 18 BRPM

## 2023-06-16 DIAGNOSIS — L89.224 PRESSURE INJURY OF TROCHANTERIC REGION OF LEFT HIP, STAGE 4: Primary | ICD-10-CM

## 2023-06-16 DIAGNOSIS — L89.44 PRESSURE INJURY OF CONTIGUOUS REGION INVOLVING BACK AND BUTTOCK, STAGE 4, UNSPECIFIED LATERALITY: ICD-10-CM

## 2023-06-16 DIAGNOSIS — L89.613 PRESSURE INJURY OF RIGHT HEEL, STAGE 3: ICD-10-CM

## 2023-06-16 DIAGNOSIS — M86.9 BONE INFECTION: ICD-10-CM

## 2023-06-16 DIAGNOSIS — L89.314 PRESSURE INJURY OF RIGHT ISCHIUM, STAGE 4: ICD-10-CM

## 2023-06-16 DIAGNOSIS — T14.8XXA WOUND INFECTION: Primary | ICD-10-CM

## 2023-06-16 DIAGNOSIS — L89.513 PRESSURE INJURY OF RIGHT ANKLE, STAGE 3: ICD-10-CM

## 2023-06-16 DIAGNOSIS — L08.9 WOUND INFECTION: Primary | ICD-10-CM

## 2023-06-16 PROCEDURE — 63600175 PHARM REV CODE 636 W HCPCS: Performed by: HOSPITALIST

## 2023-06-16 PROCEDURE — 96365 THER/PROPH/DIAG IV INF INIT: CPT

## 2023-06-16 PROCEDURE — 97606 NEG PRS WND THER DME>50 SQCM: CPT

## 2023-06-16 PROCEDURE — 96375 TX/PRO/DX INJ NEW DRUG ADDON: CPT | Mod: 59

## 2023-06-16 PROCEDURE — A4216 STERILE WATER/SALINE, 10 ML: HCPCS | Performed by: HOSPITALIST

## 2023-06-16 PROCEDURE — 25000003 PHARM REV CODE 250: Performed by: HOSPITALIST

## 2023-06-16 RX ORDER — SODIUM CHLORIDE 0.9 % (FLUSH) 0.9 %
10 SYRINGE (ML) INJECTION
Status: CANCELLED | OUTPATIENT
Start: 2023-06-16

## 2023-06-16 RX ORDER — HEPARIN 100 UNIT/ML
500 SYRINGE INTRAVENOUS
Status: CANCELLED | OUTPATIENT
Start: 2023-06-16

## 2023-06-16 RX ORDER — HEPARIN 100 UNIT/ML
500 SYRINGE INTRAVENOUS
Status: DISCONTINUED | OUTPATIENT
Start: 2023-06-16 | End: 2023-09-27

## 2023-06-16 RX ORDER — SODIUM CHLORIDE 0.9 % (FLUSH) 0.9 %
10 SYRINGE (ML) INJECTION
Status: DISCONTINUED | OUTPATIENT
Start: 2023-06-16 | End: 2023-09-27

## 2023-06-16 RX ADMIN — ERTAPENEM 1 G: 1 INJECTION INTRAMUSCULAR; INTRAVENOUS at 11:06

## 2023-06-16 RX ADMIN — Medication 10 ML: at 11:06

## 2023-06-16 RX ADMIN — Medication 20 ML: at 12:06

## 2023-06-16 RX ADMIN — HEPARIN 500 UNITS: 100 SYRINGE at 12:06

## 2023-06-16 NOTE — PATIENT INSTRUCTIONS
Cleanse wounds with: NS, soap and water, or wound cleanser  Periwound care: Skin prep; Hydrocolloid border to periwound of wound vac sites (L trochanter and R ischium)  Primary dressing: Crushed Metronidazole/Santyl/Black Granufoam to L trochanter. Adaptic to medial aspect of R ischium wound bed base then crushed Metronidazole/Santyl/Black Granufoam to R ischium. Aquacel Ag or Opticell Ag to R heel and Sacrum  Secondary dressing: Wound vac drape to L trochanter and R ischium. Gauze, abd pad, kerlix to R anterior ankle and R heel. Gauze, abd pad, secure with retention tape to Sacrum  Offloading: Offload as much as possible  Edema control: Elevation; Tubigrip F to RLE   Frequency: Tues/Fri dressing changes in WOUND CARE CENTER for L trochanter and R ischium; Change Sacrum every other day; Change R heel every 3 days   Follow-up: Tues/Fri in wound care center  Other Orders: Wound vac changes to only be performed in wound care center, not by Home Health. May reinforce wound vac drape as needed. Home Health okay to change Sacrum and RLE wounds on Home Health days          Watch wound vac tubing for bleeding.  If large amount of blood or persistant amount of drainage occurs.   Remove vac and apply dry pressure dressing. Notify wound care center.  If unable to stop bleeding and on a night or weekend, go to the nearest emergency room for evaluation.        Discharge Instructions regarding Wound Vac     Wound Vac: You should have appointments scheduled 2-3 times a week to provide wound care. During these appointments, your Wound Vac will be changed. Please bring the supplies that were provided with the Wound Vac to these appointments.      Should your Wound Vac begin to alarm, or you begin to have problems related to the Wound Vac itself, the Wound Vac company has 24-hour availability. [facility] is able to provide troubleshooting support for all V.A.C.® Therapy devices:       Helps provide on-the-spot problem identification and  resolution or deals with service requests       Convenient telephone access for all care situations      To access this service, please contact [facility] via [phone].      If unable to troubleshoot or reach [facility] after 2 hours, remove dressing and apply wet to dry dressing over wound bed.   Follow up as scheduled in Wound Care Clinic. If you have any additional questions, contact us at [phone]

## 2023-06-16 NOTE — PROGRESS NOTES
[facility]      Subjective:     Patient seen in clinic today.  Dressing changed as ordered.      Assessment:          Altered Skin Integrity 01/12/23 1532 Right Ischial tuberosity Full thickness tissue loss with exposed bone, tendon, or muscle. Often includes undermining and tunneling. May extend into muscle and/or supporting structures. (Active)   01/12/23 1532   Altered Skin Integrity Present on Admission - Did Patient arrive to the hospital with altered skin?: yes   Side: Right   Orientation:    Location: Ischial tuberosity   Wound Number:    Is this injury device related?:    Primary Wound Type:    Description of Altered Skin Integrity: Full thickness tissue loss with exposed bone, tendon, or muscle. Often includes undermining and tunneling. May extend into muscle and/or supporting structures.   Ankle-Brachial Index:    Pulses:    Removal Indication and Assessment:    Wound Outcome:    (Retired) Wound Length (cm):    (Retired) Wound Width (cm):    (Retired) Depth (cm):    Wound Description (Comments):    Removal Indications:    Description of Altered Skin Integrity Full thickness tissue loss with exposed bone, tendon, or muscle. Often includes undermining and tunneling. May extend into muscle and/or supporting structures. 06/16/23 1303   Dressing Appearance Intact;Moist drainage 06/16/23 1303   Drainage Amount Moderate 06/16/23 1303   Drainage Characteristics/Odor Sanguineous;Serosanguineous 06/16/23 1303   Appearance Red;White;Yellow;Tan;Slough;Bleeding;Ecchymotic;Not granulating;Granulating 06/16/23 1303   Tissue loss description Full thickness 06/16/23 1303   Red (%), Wound Tissue Color 65 % 06/16/23 1303   Yellow (%), Wound Tissue Color 35 % 06/16/23 1303   Periwound Area Ecchymotic;Denuded;Scar tissue 06/16/23 1303   Wound Edges Irregular 06/16/23 1303   Wound Length (cm) 12.5 cm 06/16/23 1303   Wound Width (cm) 15.5 cm 06/16/23 1303   Wound Depth (cm) 2.5 cm 06/16/23 1303   Wound Volume  (cm^3) 484.375 cm^3 06/16/23 1303   Wound Surface Area (cm^2) 193.75 cm^2 06/16/23 1303   Care Cleansed with:;Antimicrobial agent 06/16/23 1303   Dressing Applied;Other (comment) 06/16/23 1303   Periwound Care Skin barrier film applied;Topical treatment applied 06/16/23 1303            Altered Skin Integrity Left Greater trochanter Full thickness tissue loss. Base is covered by slough and/or eschar in the wound bed (Active)       Altered Skin Integrity Present on Admission - Did Patient arrive to the hospital with altered skin?: yes   Side: Left   Orientation:    Location: Greater trochanter   Wound Number:    Is this injury device related?:    Primary Wound Type:    Description of Altered Skin Integrity: Full thickness tissue loss. Base is covered by slough and/or eschar in the wound bed   Ankle-Brachial Index:    Pulses:    Removal Indication and Assessment:    Wound Outcome:    (Retired) Wound Length (cm):    (Retired) Wound Width (cm):    (Retired) Depth (cm):    Wound Description (Comments):    Removal Indications:    Dressing Appearance Intact;Moist drainage 06/16/23 1303   Drainage Amount Moderate 06/16/23 1303   Drainage Characteristics/Odor Serosanguineous 06/16/23 1303   Appearance Red;Granulating;Tan;Yellow;White;Slough;Muscle;Tendon 06/16/23 1303   Tissue loss description Full thickness 06/16/23 1303   Red (%), Wound Tissue Color 80 % 06/16/23 1303   Yellow (%), Wound Tissue Color 20 % 06/16/23 1303   Periwound Area Intact 06/16/23 1303   Wound Edges Defined 06/16/23 1303   Wound Length (cm) 2.7 cm 06/16/23 1303   Wound Width (cm) 2.8 cm 06/16/23 1303   Wound Depth (cm) 2.1 cm 06/16/23 1303   Wound Volume (cm^3) 15.876 cm^3 06/16/23 1303   Wound Surface Area (cm^2) 7.56 cm^2 06/16/23 1303   Undermining (depth (cm)/location) 9 o'clock to 1 o'clock. 3.4cm at 10 o'clock 06/16/23 1303   Care Cleansed with:;Antimicrobial agent 06/16/23 1303   Dressing Applied 06/16/23 1303   Periwound Care Skin barrier film  applied;Hydrocolloid barrier applied 06/16/23 1303   Dressing Change Due 06/20/23 06/16/23 1303         ICD-10-CM ICD-9-CM   1. 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 Pressure injury of right ischium, stage 4  L89.314 707.05     707.24   3. Pressure injury of right heel, stage 3  L89.613 707.07     707.23   4. 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, stage 4, unspecified laterality  L89.44 707.09     707.24   5. Pressure injury of right ankle, stage 3  L89.513 707.06     707.23                Orders Placed This Encounter   Procedures    Change dressing     Cleanse wound with: NS, soap and water, or wound cleanser  Periwound care: Skin prep; Hydrocolloid border to periwound of wound vac sites (L trochanter and R ischium)  Primary dressing: Crushed Metronidazole/Santyl/Black Granufoam to L trochanter and R iscium. Aquacel Ag to R heel; Santyl/Mesalt to sacrum  Secondary dressing: Wound vac drape to L trochanter and R ischium. Gauze, abd pad, kerlix to R anterior ankle and R heel. Gauze, abd pad, secure with retention tape to sacrum  Offloading: Offload as much as possible  Edema control: Elevation; Tubigrip F to RLE   Frequency: Tues/Fri dressing changes in wound care center for L trochanter and R ischium; Every 2-3 days to R heel. Daily to sacrum   Follow-up: Tues/Fri in wound care center     Standing Status:   Standing     Number of Occurrences:   1

## 2023-06-17 ENCOUNTER — INFUSION (OUTPATIENT)
Dept: INFUSION THERAPY | Facility: HOSPITAL | Age: 58
End: 2023-06-17
Attending: HOSPITALIST
Payer: MEDICARE

## 2023-06-17 VITALS
HEART RATE: 87 BPM | TEMPERATURE: 100 F | OXYGEN SATURATION: 99 % | RESPIRATION RATE: 18 BRPM | SYSTOLIC BLOOD PRESSURE: 120 MMHG | DIASTOLIC BLOOD PRESSURE: 80 MMHG

## 2023-06-17 DIAGNOSIS — M86.9 BONE INFECTION: Primary | ICD-10-CM

## 2023-06-17 PROCEDURE — 63600175 PHARM REV CODE 636 W HCPCS: Performed by: HOSPITALIST

## 2023-06-17 PROCEDURE — 25000003 PHARM REV CODE 250: Performed by: HOSPITALIST

## 2023-06-17 PROCEDURE — 96365 THER/PROPH/DIAG IV INF INIT: CPT

## 2023-06-17 RX ORDER — SODIUM CHLORIDE 0.9 % (FLUSH) 0.9 %
10 SYRINGE (ML) INJECTION
Status: CANCELLED | OUTPATIENT
Start: 2023-06-17

## 2023-06-17 RX ORDER — HEPARIN 100 UNIT/ML
500 SYRINGE INTRAVENOUS
Status: DISCONTINUED | OUTPATIENT
Start: 2023-06-17 | End: 2023-09-27

## 2023-06-17 RX ORDER — SODIUM CHLORIDE 0.9 % (FLUSH) 0.9 %
10 SYRINGE (ML) INJECTION
Status: DISCONTINUED | OUTPATIENT
Start: 2023-06-17 | End: 2023-09-27

## 2023-06-17 RX ORDER — HEPARIN 100 UNIT/ML
500 SYRINGE INTRAVENOUS
Status: CANCELLED | OUTPATIENT
Start: 2023-06-17

## 2023-06-17 RX ADMIN — ERTAPENEM 1 G: 1 INJECTION INTRAMUSCULAR; INTRAVENOUS at 10:06

## 2023-06-17 RX ADMIN — HEPARIN 500 UNITS: 100 SYRINGE at 11:06

## 2023-06-17 NOTE — PROGRESS NOTES
All V/S  charted on 6/17 at 0900 were actually take at 1100   
Patient tolerated infusion well. No swelling, redness, or drainage at the IV site. IV Flushes and positive for blood return.    
normal...

## 2023-06-18 ENCOUNTER — INFUSION (OUTPATIENT)
Dept: INFUSION THERAPY | Facility: HOSPITAL | Age: 58
End: 2023-06-18
Attending: HOSPITALIST
Payer: MEDICARE

## 2023-06-18 VITALS
SYSTOLIC BLOOD PRESSURE: 90 MMHG | HEART RATE: 129 BPM | DIASTOLIC BLOOD PRESSURE: 61 MMHG | RESPIRATION RATE: 18 BRPM | OXYGEN SATURATION: 99 % | TEMPERATURE: 98 F

## 2023-06-18 DIAGNOSIS — M86.9 BONE INFECTION: Primary | ICD-10-CM

## 2023-06-18 PROCEDURE — 25000003 PHARM REV CODE 250: Performed by: HOSPITALIST

## 2023-06-18 PROCEDURE — 96365 THER/PROPH/DIAG IV INF INIT: CPT

## 2023-06-18 PROCEDURE — 63600175 PHARM REV CODE 636 W HCPCS: Performed by: HOSPITALIST

## 2023-06-18 RX ORDER — HEPARIN 100 UNIT/ML
500 SYRINGE INTRAVENOUS
Status: CANCELLED | OUTPATIENT
Start: 2023-06-18

## 2023-06-18 RX ORDER — HEPARIN 100 UNIT/ML
500 SYRINGE INTRAVENOUS
Status: DISCONTINUED | OUTPATIENT
Start: 2023-06-18 | End: 2023-09-27

## 2023-06-18 RX ORDER — SODIUM CHLORIDE 0.9 % (FLUSH) 0.9 %
10 SYRINGE (ML) INJECTION
Status: CANCELLED | OUTPATIENT
Start: 2023-06-18

## 2023-06-18 RX ORDER — SODIUM CHLORIDE 0.9 % (FLUSH) 0.9 %
10 SYRINGE (ML) INJECTION
Status: DISCONTINUED | OUTPATIENT
Start: 2023-06-18 | End: 2023-09-27

## 2023-06-18 RX ADMIN — ERTAPENEM 1 G: 1 INJECTION INTRAMUSCULAR; INTRAVENOUS at 09:06

## 2023-06-18 NOTE — PROGRESS NOTES
Upon taking the patients vital signs he was found to have a heart rate sustaining in the 120s. I recommended that the patient go to the ED for further evaluation. The patient declined the recommendation stating he had a family function but he would return to the ED if he felt like he was getting worse. IV infusion given patient tolerated with no issues. No issues noted with the midline.

## 2023-06-19 ENCOUNTER — INFUSION (OUTPATIENT)
Dept: INFUSION THERAPY | Facility: HOSPITAL | Age: 58
End: 2023-06-19
Attending: HOSPITALIST
Payer: MEDICARE

## 2023-06-19 VITALS
TEMPERATURE: 98 F | SYSTOLIC BLOOD PRESSURE: 93 MMHG | BODY MASS INDEX: 33.65 KG/M2 | HEART RATE: 109 BPM | HEIGHT: 68 IN | WEIGHT: 222 LBS | OXYGEN SATURATION: 98 % | RESPIRATION RATE: 18 BRPM | DIASTOLIC BLOOD PRESSURE: 65 MMHG

## 2023-06-19 DIAGNOSIS — M86.9 BONE INFECTION: Primary | ICD-10-CM

## 2023-06-19 PROCEDURE — 25000003 PHARM REV CODE 250: Performed by: HOSPITALIST

## 2023-06-19 PROCEDURE — 96375 TX/PRO/DX INJ NEW DRUG ADDON: CPT

## 2023-06-19 PROCEDURE — 63600175 PHARM REV CODE 636 W HCPCS: Performed by: HOSPITALIST

## 2023-06-19 PROCEDURE — 96365 THER/PROPH/DIAG IV INF INIT: CPT

## 2023-06-19 PROCEDURE — A4216 STERILE WATER/SALINE, 10 ML: HCPCS | Performed by: HOSPITALIST

## 2023-06-19 RX ORDER — SODIUM CHLORIDE 0.9 % (FLUSH) 0.9 %
10 SYRINGE (ML) INJECTION
Status: CANCELLED | OUTPATIENT
Start: 2023-06-19

## 2023-06-19 RX ORDER — HEPARIN 100 UNIT/ML
500 SYRINGE INTRAVENOUS
Status: DISCONTINUED | OUTPATIENT
Start: 2023-06-19 | End: 2023-09-27

## 2023-06-19 RX ORDER — SODIUM CHLORIDE 0.9 % (FLUSH) 0.9 %
10 SYRINGE (ML) INJECTION
Status: DISCONTINUED | OUTPATIENT
Start: 2023-06-19 | End: 2023-09-27

## 2023-06-19 RX ORDER — HEPARIN 100 UNIT/ML
500 SYRINGE INTRAVENOUS
Status: CANCELLED | OUTPATIENT
Start: 2023-06-19

## 2023-06-19 RX ADMIN — Medication 10 ML: at 12:06

## 2023-06-19 RX ADMIN — ERTAPENEM 1 G: 1 INJECTION INTRAMUSCULAR; INTRAVENOUS at 12:06

## 2023-06-19 RX ADMIN — HEPARIN 500 UNITS: 100 SYRINGE at 01:06

## 2023-06-19 RX ADMIN — Medication 20 ML: at 01:06

## 2023-06-20 ENCOUNTER — HOSPITAL ENCOUNTER (OUTPATIENT)
Dept: WOUND CARE | Facility: HOSPITAL | Age: 58
Discharge: HOME | End: 2023-06-20
Attending: NURSE PRACTITIONER
Payer: MEDICARE

## 2023-06-20 ENCOUNTER — INFUSION (OUTPATIENT)
Dept: INFUSION THERAPY | Facility: HOSPITAL | Age: 58
End: 2023-06-20
Attending: HOSPITALIST
Payer: MEDICARE

## 2023-06-20 VITALS
RESPIRATION RATE: 18 BRPM | HEART RATE: 94 BPM | OXYGEN SATURATION: 99 % | HEIGHT: 68 IN | SYSTOLIC BLOOD PRESSURE: 96 MMHG | BODY MASS INDEX: 33.65 KG/M2 | DIASTOLIC BLOOD PRESSURE: 62 MMHG | TEMPERATURE: 98 F | WEIGHT: 222 LBS

## 2023-06-20 VITALS — HEART RATE: 99 BPM | RESPIRATION RATE: 18 BRPM | TEMPERATURE: 98 F | OXYGEN SATURATION: 98 %

## 2023-06-20 DIAGNOSIS — M86.9 BONE INFECTION: Primary | ICD-10-CM

## 2023-06-20 DIAGNOSIS — L89.613 PRESSURE INJURY OF RIGHT HEEL, STAGE 3: ICD-10-CM

## 2023-06-20 DIAGNOSIS — L89.224 PRESSURE INJURY OF TROCHANTERIC REGION OF LEFT HIP, STAGE 4: ICD-10-CM

## 2023-06-20 DIAGNOSIS — L89.44 PRESSURE INJURY OF CONTIGUOUS REGION INVOLVING BACK AND BUTTOCK, STAGE 4, UNSPECIFIED LATERALITY: ICD-10-CM

## 2023-06-20 DIAGNOSIS — S71.002D OPEN WOUND OF LEFT HIP, SUBSEQUENT ENCOUNTER: Primary | ICD-10-CM

## 2023-06-20 DIAGNOSIS — L89.314 PRESSURE INJURY OF RIGHT ISCHIUM, STAGE 4: ICD-10-CM

## 2023-06-20 DIAGNOSIS — M86.9 BONE INFECTION: ICD-10-CM

## 2023-06-20 PROCEDURE — A4216 STERILE WATER/SALINE, 10 ML: HCPCS | Performed by: HOSPITALIST

## 2023-06-20 PROCEDURE — 25000003 PHARM REV CODE 250: Performed by: HOSPITALIST

## 2023-06-20 PROCEDURE — 63600175 PHARM REV CODE 636 W HCPCS: Performed by: HOSPITALIST

## 2023-06-20 PROCEDURE — 96365 THER/PROPH/DIAG IV INF INIT: CPT

## 2023-06-20 PROCEDURE — 99214 OFFICE O/P EST MOD 30 MIN: CPT | Mod: 25

## 2023-06-20 PROCEDURE — 96375 TX/PRO/DX INJ NEW DRUG ADDON: CPT

## 2023-06-20 PROCEDURE — 99213 OFFICE O/P EST LOW 20 MIN: CPT | Mod: ,,, | Performed by: PEDIATRICS

## 2023-06-20 PROCEDURE — 99213 PR OFFICE/OUTPT VISIT, EST, LEVL III, 20-29 MIN: ICD-10-PCS | Mod: ,,, | Performed by: PEDIATRICS

## 2023-06-20 RX ORDER — SODIUM CHLORIDE 0.9 % (FLUSH) 0.9 %
10 SYRINGE (ML) INJECTION
Status: DISCONTINUED | OUTPATIENT
Start: 2023-06-20 | End: 2023-09-27

## 2023-06-20 RX ORDER — SODIUM CHLORIDE 0.9 % (FLUSH) 0.9 %
10 SYRINGE (ML) INJECTION
Status: CANCELLED | OUTPATIENT
Start: 2023-06-20

## 2023-06-20 RX ORDER — HEPARIN 100 UNIT/ML
500 SYRINGE INTRAVENOUS
Status: CANCELLED | OUTPATIENT
Start: 2023-06-20

## 2023-06-20 RX ORDER — HEPARIN 100 UNIT/ML
500 SYRINGE INTRAVENOUS
Status: DISCONTINUED | OUTPATIENT
Start: 2023-06-20 | End: 2023-09-27

## 2023-06-20 RX ADMIN — HEPARIN 500 UNITS: 100 SYRINGE at 11:06

## 2023-06-20 RX ADMIN — Medication 20 ML: at 11:06

## 2023-06-20 RX ADMIN — ERTAPENEM 1 G: 1 INJECTION INTRAMUSCULAR; INTRAVENOUS at 11:06

## 2023-06-20 RX ADMIN — Medication 10 ML: at 11:06

## 2023-06-20 NOTE — PATIENT INSTRUCTIONS
Cleanse wounds with: NS, soap and water, or wound cleanser  Periwound care: Skin prep periwounds, allow to dry  Primary dressing: Apply Opticel Ag to all open wounds  Secondary dressing: Apply gauze, abd pads, medfix tape to trochanter, ischium and sacrum, wrap foot with kerlix.  Offloading: Offload as much as possible  Edema control: Elevation; Tubigrip F to RLE   Frequency: Change Sacrum. L trochanter and R ischium daily,  Change R heel every 3 days   Follow-up: 1 week for md visit   Other Orders: Place wound vac on hold

## 2023-06-20 NOTE — PROGRESS NOTES
Subjective:       Patient ID: Antonio Galvan II is a 57 y.o. male.    Chief Complaint: Pressure Ulcer (Here for re-evaluation and treatment of multiple pressure ulcer.  NPWT in use to R ischium and L trochanter)    HPI  Review of Systems      Objective:      Temp:  [98.2 °F (36.8 °C)-98.3 °F (36.8 °C)]   Pulse:  [94-99]   Resp:  [18]   BP: (96)/(62)   SpO2:  [98 %-99 %]   Physical Exam       Negative Pressure Wound Therapy  03/20/23 1500 Right (Active)   03/20/23 1500   Side: Right   Orientation:    Location: Ischial tuberosity   Additional Comments:    Removal Indication and Assessment:    Location:    SDO Location:    NPWT Type Vacuum Therapy 06/20/23 1544   Therapy Setting NPWT Continuous therapy 06/20/23 1544   Therapy Interventions NPWT other (see comments) 06/20/23 1544            Negative Pressure Wound Therapy  05/15/23 1344 Left (Active)   05/15/23 1344   Side: Left   Orientation:    Location: Hip   Additional Comments:    Removal Indication and Assessment:    Location:    SDO Location:    NPWT Type Vacuum Therapy 06/20/23 1544   Therapy Setting NPWT Continuous therapy 06/20/23 1544   Pressure Setting NPWT 125 mmHg 06/20/23 1544   Therapy Interventions NPWT other (see comments) 06/20/23 1544            Altered Skin Integrity 05/06/22 1140 Right Heel  Full thickness tissue loss. Subcutaneous fat may be visible but bone, tendon or muscle are not exposed (Active)   05/06/22 1140   Altered Skin Integrity Present on Admission - Did Patient arrive to the hospital with altered skin?: yes   Side: Right   Orientation:    Location: Heel   Wound Number:    Is this injury device related?: No   Primary Wound Type:    Description of Altered Skin Integrity: Full thickness tissue loss. Subcutaneous fat may be visible but bone, tendon or muscle are not exposed   Ankle-Brachial Index:    Pulses:    Removal Indication and Assessment:    Wound Outcome:    (Retired) Wound Length (cm):    (Retired) Wound Width (cm):     (Retired) Depth (cm):    Wound Description (Comments):    Removal Indications:    Wound Image   06/20/23 1544   Dressing Appearance Intact;Dried drainage 06/20/23 1544   Drainage Amount Moderate 06/20/23 1544   Drainage Characteristics/Odor Sanguineous 06/20/23 1544   Appearance Red;Granulating 06/20/23 1544   Tissue loss description Full thickness 06/20/23 1544   Periwound Area Dry;Scar tissue 06/20/23 1544   Wound Edges Defined 06/20/23 1544   Wound Length (cm) 2.6 cm 06/20/23 1544   Wound Width (cm) 1.3 cm 06/20/23 1544   Wound Depth (cm) 0.1 cm 06/20/23 1544   Wound Volume (cm^3) 0.338 cm^3 06/20/23 1544   Wound Surface Area (cm^2) 3.38 cm^2 06/20/23 1544   Care Cleansed with:;Antimicrobial agent 06/20/23 1544   Dressing Applied;Hydrofiber;Silver;Gauze;Absorptive Pad;Rolled gauze 06/20/23 1544   Periwound Care Skin barrier film applied 06/20/23 1544   Compression Tubular elasticized bandage 06/20/23 1544            Altered Skin Integrity 01/12/23 1530 Sacral spine Full thickness tissue loss with exposed bone, tendon, or muscle. Often includes undermining and tunneling. May extend into muscle and/or supporting structures. (Active)   01/12/23 1530   Altered Skin Integrity Present on Admission - Did Patient arrive to the hospital with altered skin?: yes   Side:    Orientation:    Location: Sacral spine   Wound Number:    Is this injury device related?:    Primary Wound Type:    Description of Altered Skin Integrity: Full thickness tissue loss with exposed bone, tendon, or muscle. Often includes undermining and tunneling. May extend into muscle and/or supporting structures.   Ankle-Brachial Index:    Pulses:    Removal Indication and Assessment:    Wound Outcome:    (Retired) Wound Length (cm):    (Retired) Wound Width (cm):    (Retired) Depth (cm):    Wound Description (Comments):    Removal Indications:    Wound Image   06/20/23 1544   Description of Altered Skin Integrity Full thickness tissue loss.  Subcutaneous fat may be visible but bone, tendon or muscle are not exposed 06/20/23 1544   Drainage Amount Moderate 06/20/23 1544   Drainage Characteristics/Odor Serosanguineous 06/20/23 1544   Appearance Pink;Red;Tan;Fibrin 06/20/23 1544   Tissue loss description Full thickness 06/20/23 1544   Periwound Area Scar tissue;Dry 06/20/23 1544   Wound Edges Irregular 06/20/23 1544   Wound Length (cm) 3.2 cm 06/20/23 1544   Wound Width (cm) 0.7 cm 06/20/23 1544   Wound Depth (cm) 0.8 cm 06/20/23 1544   Wound Volume (cm^3) 1.792 cm^3 06/20/23 1544   Wound Surface Area (cm^2) 2.24 cm^2 06/20/23 1544   Care Cleansed with:;Antimicrobial agent 06/20/23 1544   Dressing Applied;Hydrofiber;Silver;Gauze;Absorptive Pad 06/20/23 1544   Periwound Care Skin barrier film applied 06/20/23 1544            Altered Skin Integrity 01/12/23 1532 Right Ischial tuberosity Full thickness tissue loss with exposed bone, tendon, or muscle. Often includes undermining and tunneling. May extend into muscle and/or supporting structures. (Active)   01/12/23 1532   Altered Skin Integrity Present on Admission - Did Patient arrive to the hospital with altered skin?: yes   Side: Right   Orientation:    Location: Ischial tuberosity   Wound Number:    Is this injury device related?:    Primary Wound Type:    Description of Altered Skin Integrity: Full thickness tissue loss with exposed bone, tendon, or muscle. Often includes undermining and tunneling. May extend into muscle and/or supporting structures.   Ankle-Brachial Index:    Pulses:    Removal Indication and Assessment:    Wound Outcome:    (Retired) Wound Length (cm):    (Retired) Wound Width (cm):    (Retired) Depth (cm):    Wound Description (Comments):    Removal Indications:    Wound Image   06/20/23 1544   Description of Altered Skin Integrity Full thickness tissue loss with exposed bone, tendon, or muscle. Often includes undermining and tunneling. May extend into muscle and/or supporting  structures. 06/20/23 1544   Dressing Appearance Intact 06/20/23 1544   Drainage Amount Moderate 06/20/23 1544   Drainage Characteristics/Odor Serosanguineous;No odor 06/20/23 1544   Appearance Red;Granulating;Ecchymotic;Bleeding;Purple;Maroon;Yellow;Tan;Slough;Adipose;Muscle 06/20/23 1544   Tissue loss description Full thickness 06/20/23 1544   Red (%), Wound Tissue Color 60 % 06/20/23 1544   Yellow (%), Wound Tissue Color 40 % 06/20/23 1544   Periwound Area Ecchymotic;Denuded;Scar tissue;Macerated 06/20/23 1544   Wound Edges Irregular 06/20/23 1544   Wound Length (cm) 13.2 cm 06/20/23 1544   Wound Width (cm) 14.7 cm 06/20/23 1544   Wound Depth (cm) 1.7 cm 06/20/23 1544   Wound Volume (cm^3) 329.868 cm^3 06/20/23 1544   Wound Surface Area (cm^2) 194.04 cm^2 06/20/23 1544   Care Cleansed with:;Antimicrobial agent;Removed:;Other (see comments) 06/20/23 1544   Dressing Applied;Hydrofiber;Silver;Gauze;Absorptive Pad 06/20/23 1544   Periwound Care Skin barrier film applied 06/20/23 1544            Altered Skin Integrity 02/09/23 1324 Right anterior Ankle Ulceration Full thickness tissue loss. Subcutaneous fat may be visible but bone, tendon or muscle are not exposed (Active)   02/09/23 1324   Altered Skin Integrity Present on Admission - Did Patient arrive to the hospital with altered skin?: yes   Side: Right   Orientation: anterior   Location: Ankle   Wound Number:    Is this injury device related?:    Primary Wound Type: Ulceration   Description of Altered Skin Integrity: Full thickness tissue loss. Subcutaneous fat may be visible but bone, tendon or muscle are not exposed   Ankle-Brachial Index:    Pulses:    Removal Indication and Assessment:    Wound Outcome:    (Retired) Wound Length (cm):    (Retired) Wound Width (cm):    (Retired) Depth (cm):    Wound Description (Comments):    Removal Indications:    Wound Image   06/20/23 1544   Dressing Appearance Intact;Dry 06/20/23 1544   Drainage Amount None 06/20/23 1544    Appearance Dry;Tan;Closed/resurfaced 06/20/23 1544   Periwound Area Scar tissue 06/20/23 1544   Wound Edges Defined 06/20/23 1544   Wound Length (cm) 0.5 cm 06/20/23 1544   Wound Width (cm) 0.5 cm 06/20/23 1544   Wound Depth (cm) 0.1 cm 06/20/23 1544   Wound Volume (cm^3) 0.025 cm^3 06/20/23 1544   Wound Surface Area (cm^2) 0.25 cm^2 06/20/23 1544   Care Cleansed with:;Antimicrobial agent 06/20/23 1544   Dressing Applied;Gauze;Rolled gauze;Tubular bandage 06/20/23 1544   Compression Tubular elasticized bandage 06/20/23 1544            Altered Skin Integrity Left Greater trochanter Full thickness tissue loss. Base is covered by slough and/or eschar in the wound bed (Active)       Altered Skin Integrity Present on Admission - Did Patient arrive to the hospital with altered skin?: yes   Side: Left   Orientation:    Location: Greater trochanter   Wound Number:    Is this injury device related?:    Primary Wound Type:    Description of Altered Skin Integrity: Full thickness tissue loss. Base is covered by slough and/or eschar in the wound bed   Ankle-Brachial Index:    Pulses:    Removal Indication and Assessment:    Wound Outcome:    (Retired) Wound Length (cm):    (Retired) Wound Width (cm):    (Retired) Depth (cm):    Wound Description (Comments):    Removal Indications:    Wound Image   06/20/23 1544   Dressing Appearance Intact;Moist drainage 06/20/23 1544   Drainage Amount Moderate 06/20/23 1544   Drainage Characteristics/Odor Serosanguineous 06/20/23 1544   Appearance Red;Granulating;White;Yellow;Tan;Slough;Tendon 06/20/23 1544   Tissue loss description Full thickness 06/20/23 1544   Red (%), Wound Tissue Color 80 % 06/20/23 1544   Periwound Area Intact 06/20/23 1544   Wound Edges Defined 06/20/23 1544   Wound Length (cm) 2.5 cm 06/20/23 1544   Wound Width (cm) 3 cm 06/20/23 1544   Wound Depth (cm) 2.5 cm 06/20/23 1544   Wound Volume (cm^3) 18.75 cm^3 06/20/23 1544   Wound Surface Area (cm^2) 7.5 cm^2 06/20/23  1544   Undermining (depth (cm)/location) 7 o'clock to 1 o'clock. 3.6cm at 10 o'clock 06/20/23 1544   Care Cleansed with:;Antimicrobial agent;Removed:;Other (see comments) 06/20/23 1544   Dressing Applied;Hydrofiber;Silver;Gauze;Absorptive Pad;Other (comment) 06/20/23 1544   Periwound Care Skin barrier film applied 06/20/23 1544         Assessment:     Today wound of right heel is improving well.  Sacral wound is improving some also and looks clean and not infected.  Right ischial wound is having more necrotic areas with bruising.  This was cleaned and Opticel will be applied.  Left trochanter wound shows some granulation tissue.  All these areas were cleaned and Aquacel Ag was applied with bandages.  Hemoglobin of 4 days ago was 7.0.  Not to re-apply the wound VAC at this time.  Patient was sent to lab to get repeat hemoglobin.  We will contact his primary and infectious disease physicians.  If we do not contact them today we will speak to them tomorrow about transfusion the patient.  Dressings will be changed daily and patient will return in 1 week for physician visit.    ICD-10-CM ICD-9-CM   1. Open wound of left hip, subsequent encounter  S71.002D V58.89     890.0   2. 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 Pressure injury of right ischium, stage 4  L89.314 707.05     707.24   4. Pressure injury of right heel, stage 3  L89.613 707.07     707.23   5. 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, stage 4, unspecified laterality  L89.44 707.09     707.24   6. Bone infection  M86.9 730.90         Plan:   Tissue pathology and/or culture taken:  [] Yes [x] No   Sharp debridement performed:   [] Yes [x] No   Labs ordered this visit:   [] Yes [x] No   Imaging ordered this visit:   [] Yes [x] No           Orders Placed This Encounter   Procedures    CBC Auto Differential     Standing Status:   Future     Standing Expiration Date:   8/18/2024    Change  dressing     Cleanse wounds with: NS, soap and water, or wound cleanser  Periwound care: Skin prep periwounds, allow to dry  Primary dressing: Apply Opticel Ag to all open wounds  Secondary dressing: Apply gauze, abd pads, medfix tape to trochanter, ischium and sacrum, wrap foot with kerlix.  Offloading: Offload as much as possible  Edema control: Elevation; Tubigrip F to RLE   Frequency: Change Sacrum. L trochanter and R ischium daily,  Change R heel every 3 days   Follow-up: 1 week for md visit   Other Orders: Place wound vac on hold           Follow up in about 1 week (around 6/27/2023) for md visit .

## 2023-06-21 ENCOUNTER — INFUSION (OUTPATIENT)
Dept: INFUSION THERAPY | Facility: HOSPITAL | Age: 58
End: 2023-06-21
Attending: HOSPITALIST
Payer: MEDICARE

## 2023-06-21 VITALS
HEIGHT: 68 IN | WEIGHT: 222 LBS | TEMPERATURE: 98 F | DIASTOLIC BLOOD PRESSURE: 81 MMHG | HEART RATE: 104 BPM | BODY MASS INDEX: 33.65 KG/M2 | RESPIRATION RATE: 18 BRPM | SYSTOLIC BLOOD PRESSURE: 120 MMHG | OXYGEN SATURATION: 98 %

## 2023-06-21 DIAGNOSIS — M86.9 BONE INFECTION: Primary | ICD-10-CM

## 2023-06-21 PROCEDURE — A4216 STERILE WATER/SALINE, 10 ML: HCPCS | Performed by: HOSPITALIST

## 2023-06-21 PROCEDURE — 96375 TX/PRO/DX INJ NEW DRUG ADDON: CPT

## 2023-06-21 PROCEDURE — 96365 THER/PROPH/DIAG IV INF INIT: CPT

## 2023-06-21 PROCEDURE — 63600175 PHARM REV CODE 636 W HCPCS: Performed by: HOSPITALIST

## 2023-06-21 PROCEDURE — 25000003 PHARM REV CODE 250: Performed by: HOSPITALIST

## 2023-06-21 RX ORDER — HEPARIN 100 UNIT/ML
500 SYRINGE INTRAVENOUS
Status: DISCONTINUED | OUTPATIENT
Start: 2023-06-21 | End: 2023-09-27

## 2023-06-21 RX ORDER — SODIUM CHLORIDE 0.9 % (FLUSH) 0.9 %
10 SYRINGE (ML) INJECTION
Status: DISCONTINUED | OUTPATIENT
Start: 2023-06-21 | End: 2023-09-27

## 2023-06-21 RX ORDER — SODIUM CHLORIDE 0.9 % (FLUSH) 0.9 %
10 SYRINGE (ML) INJECTION
Status: CANCELLED | OUTPATIENT
Start: 2023-06-21

## 2023-06-21 RX ORDER — HEPARIN 100 UNIT/ML
500 SYRINGE INTRAVENOUS
Status: CANCELLED | OUTPATIENT
Start: 2023-06-21

## 2023-06-21 RX ADMIN — ERTAPENEM 1 G: 1 INJECTION INTRAMUSCULAR; INTRAVENOUS at 11:06

## 2023-06-21 RX ADMIN — HEPARIN 500 UNITS: 100 SYRINGE at 12:06

## 2023-06-21 RX ADMIN — Medication 10 ML: at 11:06

## 2023-06-21 RX ADMIN — Medication 30 ML: at 12:06

## 2023-06-22 ENCOUNTER — INFUSION (OUTPATIENT)
Dept: INFUSION THERAPY | Facility: HOSPITAL | Age: 58
End: 2023-06-22
Attending: HOSPITALIST
Payer: MEDICARE

## 2023-06-22 VITALS
TEMPERATURE: 98 F | SYSTOLIC BLOOD PRESSURE: 107 MMHG | WEIGHT: 222 LBS | HEIGHT: 68 IN | HEART RATE: 91 BPM | DIASTOLIC BLOOD PRESSURE: 69 MMHG | BODY MASS INDEX: 33.65 KG/M2 | RESPIRATION RATE: 18 BRPM | OXYGEN SATURATION: 98 %

## 2023-06-22 DIAGNOSIS — M86.9 BONE INFECTION: Primary | ICD-10-CM

## 2023-06-22 PROCEDURE — 63600175 PHARM REV CODE 636 W HCPCS: Performed by: HOSPITALIST

## 2023-06-22 PROCEDURE — A4216 STERILE WATER/SALINE, 10 ML: HCPCS | Performed by: HOSPITALIST

## 2023-06-22 PROCEDURE — 96375 TX/PRO/DX INJ NEW DRUG ADDON: CPT

## 2023-06-22 PROCEDURE — 25000003 PHARM REV CODE 250: Performed by: HOSPITALIST

## 2023-06-22 PROCEDURE — 96365 THER/PROPH/DIAG IV INF INIT: CPT

## 2023-06-22 RX ORDER — HEPARIN 100 UNIT/ML
500 SYRINGE INTRAVENOUS
Status: CANCELLED | OUTPATIENT
Start: 2023-06-22

## 2023-06-22 RX ORDER — SODIUM CHLORIDE 0.9 % (FLUSH) 0.9 %
10 SYRINGE (ML) INJECTION
Status: DISCONTINUED | OUTPATIENT
Start: 2023-06-22 | End: 2023-09-27

## 2023-06-22 RX ORDER — SODIUM CHLORIDE 0.9 % (FLUSH) 0.9 %
10 SYRINGE (ML) INJECTION
Status: CANCELLED | OUTPATIENT
Start: 2023-06-22

## 2023-06-22 RX ORDER — HEPARIN 100 UNIT/ML
500 SYRINGE INTRAVENOUS
Status: DISCONTINUED | OUTPATIENT
Start: 2023-06-22 | End: 2023-09-27

## 2023-06-22 RX ADMIN — Medication 20 ML: at 12:06

## 2023-06-22 RX ADMIN — HEPARIN 500 UNITS: 100 SYRINGE at 12:06

## 2023-06-22 RX ADMIN — Medication 10 ML: at 11:06

## 2023-06-22 RX ADMIN — ERTAPENEM 1 G: 1 INJECTION INTRAMUSCULAR; INTRAVENOUS at 11:06

## 2023-06-23 ENCOUNTER — INFUSION (OUTPATIENT)
Dept: INFUSION THERAPY | Facility: HOSPITAL | Age: 58
End: 2023-06-23
Attending: HOSPITALIST
Payer: MEDICARE

## 2023-06-23 VITALS
WEIGHT: 222 LBS | OXYGEN SATURATION: 98 % | HEIGHT: 68 IN | BODY MASS INDEX: 33.65 KG/M2 | RESPIRATION RATE: 18 BRPM | TEMPERATURE: 98 F | HEART RATE: 113 BPM

## 2023-06-23 DIAGNOSIS — M86.9 BONE INFECTION: Primary | ICD-10-CM

## 2023-06-23 DIAGNOSIS — M86.9 INFLAMMATION OF BONE: ICD-10-CM

## 2023-06-23 LAB
ALBUMIN SERPL-MCNC: 2.4 G/DL (ref 3.5–5)
ALBUMIN/GLOB SERPL: 0.7 RATIO (ref 1.1–2)
ALP SERPL-CCNC: 93 UNIT/L (ref 40–150)
ALT SERPL-CCNC: 7 UNIT/L (ref 0–55)
AST SERPL-CCNC: 6 UNIT/L (ref 5–34)
BASOPHILS # BLD AUTO: 0.01 X10(3)/MCL
BASOPHILS NFR BLD AUTO: 0.2 %
BILIRUBIN DIRECT+TOT PNL SERPL-MCNC: 0.2 MG/DL
BUN SERPL-MCNC: 34.7 MG/DL (ref 8.4–25.7)
CALCIUM SERPL-MCNC: 8.2 MG/DL (ref 8.4–10.2)
CHLORIDE SERPL-SCNC: 108 MMOL/L (ref 98–107)
CO2 SERPL-SCNC: 27 MMOL/L (ref 22–29)
CREAT SERPL-MCNC: 1.12 MG/DL (ref 0.73–1.18)
CRP SERPL-MCNC: 5.8 MG/L
EOSINOPHIL # BLD AUTO: 0.17 X10(3)/MCL (ref 0–0.9)
EOSINOPHIL NFR BLD AUTO: 2.9 %
ERYTHROCYTE [DISTWIDTH] IN BLOOD BY AUTOMATED COUNT: 21.8 % (ref 11.5–17)
ERYTHROCYTE [SEDIMENTATION RATE] IN BLOOD: 131 MM/HR (ref 0–15)
GFR SERPLBLD CREATININE-BSD FMLA CKD-EPI: >60 MLS/MIN/1.73/M2
GLOBULIN SER-MCNC: 3.4 GM/DL (ref 2.4–3.5)
GLUCOSE SERPL-MCNC: 125 MG/DL (ref 74–100)
HCT VFR BLD AUTO: 25.2 % (ref 42–52)
HGB BLD-MCNC: 7.6 G/DL (ref 14–18)
IMM GRANULOCYTES # BLD AUTO: 0.02 X10(3)/MCL (ref 0–0.04)
IMM GRANULOCYTES NFR BLD AUTO: 0.3 %
LYMPHOCYTES # BLD AUTO: 1.15 X10(3)/MCL (ref 0.6–4.6)
LYMPHOCYTES NFR BLD AUTO: 19.9 %
MCH RBC QN AUTO: 25 PG (ref 27–31)
MCHC RBC AUTO-ENTMCNC: 30.2 G/DL (ref 33–36)
MCV RBC AUTO: 82.9 FL (ref 80–94)
MONOCYTES # BLD AUTO: 0.34 X10(3)/MCL (ref 0.1–1.3)
MONOCYTES NFR BLD AUTO: 5.9 %
NEUTROPHILS # BLD AUTO: 4.1 X10(3)/MCL (ref 2.1–9.2)
NEUTROPHILS NFR BLD AUTO: 70.8 %
PLATELET # BLD AUTO: 227 X10(3)/MCL (ref 130–400)
PMV BLD AUTO: 9.6 FL (ref 7.4–10.4)
POTASSIUM SERPL-SCNC: 4.2 MMOL/L (ref 3.5–5.1)
PROT SERPL-MCNC: 5.8 GM/DL (ref 6.4–8.3)
RBC # BLD AUTO: 3.04 X10(6)/MCL (ref 4.7–6.1)
SODIUM SERPL-SCNC: 142 MMOL/L (ref 136–145)
WBC # SPEC AUTO: 5.79 X10(3)/MCL (ref 4.5–11.5)

## 2023-06-23 PROCEDURE — 96375 TX/PRO/DX INJ NEW DRUG ADDON: CPT

## 2023-06-23 PROCEDURE — 63600175 PHARM REV CODE 636 W HCPCS: Performed by: HOSPITALIST

## 2023-06-23 PROCEDURE — 36415 COLL VENOUS BLD VENIPUNCTURE: CPT

## 2023-06-23 PROCEDURE — 86140 C-REACTIVE PROTEIN: CPT

## 2023-06-23 PROCEDURE — 85025 COMPLETE CBC W/AUTO DIFF WBC: CPT

## 2023-06-23 PROCEDURE — 85652 RBC SED RATE AUTOMATED: CPT

## 2023-06-23 PROCEDURE — A4216 STERILE WATER/SALINE, 10 ML: HCPCS | Performed by: HOSPITALIST

## 2023-06-23 PROCEDURE — 25000003 PHARM REV CODE 250: Performed by: HOSPITALIST

## 2023-06-23 PROCEDURE — 36415 COLL VENOUS BLD VENIPUNCTURE: CPT | Mod: 91

## 2023-06-23 PROCEDURE — 80053 COMPREHEN METABOLIC PANEL: CPT

## 2023-06-23 PROCEDURE — 96365 THER/PROPH/DIAG IV INF INIT: CPT

## 2023-06-23 RX ORDER — SODIUM CHLORIDE 0.9 % (FLUSH) 0.9 %
10 SYRINGE (ML) INJECTION
Status: DISCONTINUED | OUTPATIENT
Start: 2023-06-23 | End: 2023-09-27

## 2023-06-23 RX ORDER — HEPARIN 100 UNIT/ML
500 SYRINGE INTRAVENOUS
Status: DISCONTINUED | OUTPATIENT
Start: 2023-06-23 | End: 2023-09-27

## 2023-06-23 RX ORDER — HEPARIN 100 UNIT/ML
500 SYRINGE INTRAVENOUS
OUTPATIENT
Start: 2023-06-23

## 2023-06-23 RX ORDER — SODIUM CHLORIDE 0.9 % (FLUSH) 0.9 %
10 SYRINGE (ML) INJECTION
OUTPATIENT
Start: 2023-06-23

## 2023-06-23 RX ADMIN — Medication 10 ML: at 11:06

## 2023-06-23 RX ADMIN — ERTAPENEM 1 G: 1 INJECTION INTRAMUSCULAR; INTRAVENOUS at 11:06

## 2023-06-23 RX ADMIN — Medication 20 ML: at 12:06

## 2023-06-23 RX ADMIN — HEPARIN 500 UNITS: 100 SYRINGE at 12:06

## 2023-06-24 ENCOUNTER — INFUSION (OUTPATIENT)
Dept: INFUSION THERAPY | Facility: HOSPITAL | Age: 58
End: 2023-06-24
Attending: HOSPITALIST
Payer: MEDICARE

## 2023-06-24 RX ORDER — HEPARIN 100 UNIT/ML
500 SYRINGE INTRAVENOUS
OUTPATIENT
Start: 2023-06-24

## 2023-06-24 RX ORDER — SODIUM CHLORIDE 0.9 % (FLUSH) 0.9 %
10 SYRINGE (ML) INJECTION
OUTPATIENT
Start: 2023-06-24

## 2023-06-24 NOTE — PROGRESS NOTES
0930 Pt arrived and placed in outpatient surgery room 1. VS obtained. /68, HR 90, RR, 18, SPO2 97%, TEMP 98. Pt left upper arm midline flushed with NS and 1 gram of Ertapenum in 100ml NS infusion started at 0950. Infusion completed at 1020. Left upper arm midline discontinued. Patient and family member educated on remaining Ertapenum doses to be given at home. Were educated on reconstitution and IM injections. They verbalized understanding and all questions were answered. Patient has all of the needed supplies to perform the home injections.

## 2023-06-27 ENCOUNTER — HOSPITAL ENCOUNTER (OUTPATIENT)
Dept: WOUND CARE | Facility: HOSPITAL | Age: 58
Discharge: HOME | End: 2023-06-27
Attending: NURSE PRACTITIONER
Payer: MEDICARE

## 2023-06-27 VITALS — RESPIRATION RATE: 20 BRPM | HEART RATE: 92 BPM | OXYGEN SATURATION: 96 % | TEMPERATURE: 100 F

## 2023-06-27 DIAGNOSIS — L89.44 PRESSURE INJURY OF CONTIGUOUS REGION INVOLVING BACK AND BUTTOCK, STAGE 4, UNSPECIFIED LATERALITY: ICD-10-CM

## 2023-06-27 DIAGNOSIS — M86.9 BONE INFECTION: ICD-10-CM

## 2023-06-27 DIAGNOSIS — L89.314 PRESSURE INJURY OF RIGHT ISCHIUM, STAGE 4: ICD-10-CM

## 2023-06-27 DIAGNOSIS — L89.613 PRESSURE INJURY OF RIGHT HEEL, STAGE 3: ICD-10-CM

## 2023-06-27 DIAGNOSIS — L89.224 PRESSURE INJURY OF TROCHANTERIC REGION OF LEFT HIP, STAGE 4: Primary | ICD-10-CM

## 2023-06-27 PROCEDURE — 99213 OFFICE O/P EST LOW 20 MIN: CPT | Mod: ,,, | Performed by: PEDIATRICS

## 2023-06-27 PROCEDURE — 99215 OFFICE O/P EST HI 40 MIN: CPT

## 2023-06-27 PROCEDURE — 99213 PR OFFICE/OUTPT VISIT, EST, LEVL III, 20-29 MIN: ICD-10-PCS | Mod: ,,, | Performed by: PEDIATRICS

## 2023-06-27 PROCEDURE — 27000999 HC MEDICAL RECORD PHOTO DOCUMENTATION

## 2023-06-27 NOTE — PATIENT INSTRUCTIONS
Cleanse wounds with: NS or wound cleanser  Periwound care: Skin prep periwounds, allow to dry  Primary dressing: Apply Opticel Ag to all open wounds - Home health to make sure you have adequate sizes of supplies based on wound size  Secondary dressing: Apply gauze, abd pads, medfix tape to trochanter, ischium and sacrum  Cover dressing for foot - gauze, abd, kerlix, with kerlix.  Offloading: Offload as much as possible  Edema control: Elevation; Tubigrip F to RLE   Frequency: Change Sacrum., L trochanter and R ischium daily,  Change R heel every 3 days   Follow-up:2 weeks for md visit   Other Orders: Keep wound vac to R ischium and L trochanter on hold

## 2023-06-27 NOTE — PROGRESS NOTES
Subjective:       Patient ID: Antonio Galvan II is a 57 y.o. male.    Chief Complaint: Pressure Ulcer (Pressure ulcers R heel, R ischium, sacrum , L trochanter.   Follow up with md for re-evaluation and treatment.  Wound vac placed on hold at last appointment)    HPI  Review of Systems      Objective:      Temp:  [99.7 °F (37.6 °C)]   Pulse:  [92]   Resp:  [20]   SpO2:  [96 %]   Physical Exam       Negative Pressure Wound Therapy  03/20/23 1500 Right (Active)   03/20/23 1500   Side: Right   Orientation:    Location: Ischial tuberosity   Additional Comments:    Removal Indication and Assessment:    Location:    SDO Location:    NPWT Type Vacuum Therapy 06/27/23 1505   Therapy Interventions NPWT other (see comments) 06/27/23 1505            Negative Pressure Wound Therapy  05/15/23 1344 Left (Active)   05/15/23 1344   Side: Left   Orientation:    Location: Hip   Additional Comments:    Removal Indication and Assessment:    Location:    SDO Location:    NPWT Type Vacuum Therapy 06/27/23 1505   Therapy Interventions NPWT other (see comments) 06/27/23 1505            Altered Skin Integrity 05/06/22 1140 Right Heel  Full thickness tissue loss. Subcutaneous fat may be visible but bone, tendon or muscle are not exposed (Active)   05/06/22 1140   Altered Skin Integrity Present on Admission - Did Patient arrive to the hospital with altered skin?: yes   Side: Right   Orientation:    Location: Heel   Wound Number:    Is this injury device related?: No   Primary Wound Type:    Description of Altered Skin Integrity: Full thickness tissue loss. Subcutaneous fat may be visible but bone, tendon or muscle are not exposed   Ankle-Brachial Index:    Pulses:    Removal Indication and Assessment:    Wound Outcome:    (Retired) Wound Length (cm):    (Retired) Wound Width (cm):    (Retired) Depth (cm):    Wound Description (Comments):    Removal Indications:    Wound Image   06/27/23 1505   Dressing Appearance Intact;Moist drainage  06/27/23 1505   Drainage Amount Moderate 06/27/23 1505   Drainage Characteristics/Odor Serosanguineous 06/27/23 1505   Appearance Red;Granulating;Other (see comments) 06/27/23 1505   Tissue loss description Full thickness 06/27/23 1505   Periwound Area Intact;Scar tissue;Dry 06/27/23 1505   Wound Edges Defined 06/27/23 1505   Wound Length (cm) 1.5 cm 06/27/23 1505   Wound Width (cm) 1.5 cm 06/27/23 1505   Wound Depth (cm) 0.1 cm 06/27/23 1505   Wound Volume (cm^3) 0.225 cm^3 06/27/23 1505   Wound Surface Area (cm^2) 2.25 cm^2 06/27/23 1505   Care Cleansed with:;Antimicrobial agent 06/27/23 1505   Dressing Applied;Hydrofiber;Silver;Gauze;Absorptive Pad;Rolled gauze;Tubular bandage 06/27/23 1505   Periwound Care Skin barrier film applied 06/27/23 1505   Compression Tubular elasticized bandage 06/27/23 1505            Altered Skin Integrity 01/12/23 1530 Sacral spine Full thickness tissue loss with exposed bone, tendon, or muscle. Often includes undermining and tunneling. May extend into muscle and/or supporting structures. (Active)   01/12/23 1530   Altered Skin Integrity Present on Admission - Did Patient arrive to the hospital with altered skin?: yes   Side:    Orientation:    Location: Sacral spine   Wound Number:    Is this injury device related?:    Primary Wound Type:    Description of Altered Skin Integrity: Full thickness tissue loss with exposed bone, tendon, or muscle. Often includes undermining and tunneling. May extend into muscle and/or supporting structures.   Ankle-Brachial Index:    Pulses:    Removal Indication and Assessment:    Wound Outcome:    (Retired) Wound Length (cm):    (Retired) Wound Width (cm):    (Retired) Depth (cm):    Wound Description (Comments):    Removal Indications:    Wound Image   06/27/23 1505   Description of Altered Skin Integrity Full thickness tissue loss. Subcutaneous fat may be visible but bone, tendon or muscle are not exposed 06/27/23 1505   Drainage Amount Moderate  06/27/23 1505   Drainage Characteristics/Odor Serosanguineous;No odor;Bleeding controlled 06/27/23 1505   Appearance Pink;Red;Granulating;White;Fibrin 06/27/23 1505   Tissue loss description Full thickness 06/27/23 1505   Periwound Area Scar tissue 06/27/23 1505   Wound Edges Defined 06/27/23 1505   Wound Length (cm) 3 cm 06/27/23 1505   Wound Width (cm) 0.5 cm 06/27/23 1505   Wound Depth (cm) 0.8 cm 06/27/23 1505   Wound Volume (cm^3) 1.2 cm^3 06/27/23 1505   Wound Surface Area (cm^2) 1.5 cm^2 06/27/23 1505   Care Cleansed with:;Antimicrobial agent 06/27/23 1505   Dressing Applied;Hydrofiber;Silver;Gauze;Absorptive Pad;Other (comment) 06/27/23 1505   Periwound Care Skin barrier film applied 06/27/23 1505            Altered Skin Integrity 01/12/23 1532 Right Ischial tuberosity Full thickness tissue loss with exposed bone, tendon, or muscle. Often includes undermining and tunneling. May extend into muscle and/or supporting structures. (Active)   01/12/23 1532   Altered Skin Integrity Present on Admission - Did Patient arrive to the hospital with altered skin?: yes   Side: Right   Orientation:    Location: Ischial tuberosity   Wound Number:    Is this injury device related?:    Primary Wound Type:    Description of Altered Skin Integrity: Full thickness tissue loss with exposed bone, tendon, or muscle. Often includes undermining and tunneling. May extend into muscle and/or supporting structures.   Ankle-Brachial Index:    Pulses:    Removal Indication and Assessment:    Wound Outcome:    (Retired) Wound Length (cm):    (Retired) Wound Width (cm):    (Retired) Depth (cm):    Wound Description (Comments):    Removal Indications:    Wound Image   06/27/23 1505   Dressing Appearance Intact;Moist drainage 06/27/23 1505   Drainage Amount Large 06/27/23 1505   Drainage Characteristics/Odor Serosanguineous;No odor;Bleeding controlled 06/27/23 1505   Appearance Red;Pink;Granulating;Tan;Yellow;Adipose;Slough 06/27/23 1505    Tissue loss description Full thickness 06/27/23 1505   Red (%), Wound Tissue Color 75 % 06/27/23 1505   Yellow (%), Wound Tissue Color 25 % 06/27/23 1505   Periwound Area Denuded;Scar tissue 06/27/23 1505   Wound Edges Irregular 06/27/23 1505   Wound Length (cm) 12.8 cm 06/27/23 1505   Wound Width (cm) 15.2 cm 06/27/23 1505   Wound Depth (cm) 2.6 cm 06/27/23 1505   Wound Volume (cm^3) 505.856 cm^3 06/27/23 1505   Wound Surface Area (cm^2) 194.56 cm^2 06/27/23 1505   Care Cleansed with:;Antimicrobial agent 06/27/23 1505   Dressing Applied;Hydrofiber;Silver;Gauze;Absorptive Pad;Other (comment) 06/27/23 1505   Periwound Care Skin barrier film applied 06/27/23 1505            Altered Skin Integrity Left Greater trochanter Full thickness tissue loss. Base is covered by slough and/or eschar in the wound bed (Active)       Altered Skin Integrity Present on Admission - Did Patient arrive to the hospital with altered skin?: yes   Side: Left   Orientation:    Location: Greater trochanter   Wound Number:    Is this injury device related?:    Primary Wound Type:    Description of Altered Skin Integrity: Full thickness tissue loss. Base is covered by slough and/or eschar in the wound bed   Ankle-Brachial Index:    Pulses:    Removal Indication and Assessment:    Wound Outcome:    (Retired) Wound Length (cm):    (Retired) Wound Width (cm):    (Retired) Depth (cm):    Wound Description (Comments):    Removal Indications:    Wound Image   06/27/23 1505   Dressing Appearance Intact;Moist drainage 06/27/23 1548   Drainage Amount Large 06/27/23 1505   Drainage Characteristics/Odor Serosanguineous 06/27/23 1505   Appearance Red;Granulating;Tan;White;Yellow;Slough;Tendon 06/27/23 1505   Tissue loss description Full thickness 06/27/23 1505   Red (%), Wound Tissue Color 70 % 06/27/23 1505   Periwound Area Intact 06/27/23 1505   Wound Edges Irregular 06/27/23 1548   Wound Length (cm) 1.9 cm 06/27/23 1505   Wound Width (cm) 3.4 cm  06/27/23 1505   Wound Depth (cm) 2 cm 06/27/23 1548   Wound Surface Area (cm^2) 6.46 cm^2 06/27/23 1505   Undermining (depth (cm)/location) 7 o'clock to 1 o'clock. 3.6cm at 10 o'clock 06/27/23 1505   Care Cleansed with:;Antimicrobial agent 06/27/23 1505   Dressing Applied;Hydrofiber;Silver;Gauze;Absorptive Pad;Other (comment) 06/27/23 1505   Periwound Care Skin barrier film applied 06/27/23 1548       [REMOVED]      Altered Skin Integrity 06/27/23 1324 Right anterior Ankle Ulceration Full thickness tissue loss. Subcutaneous fat may be visible but bone, tendon or muscle are not exposed (Removed)   06/27/23 1324   Altered Skin Integrity Present on Admission - Did Patient arrive to the hospital with altered skin?: yes   Side: Right   Orientation: anterior   Location: Ankle   Wound Number:    Is this injury device related?:    Primary Wound Type: Ulceration   Description of Altered Skin Integrity: Full thickness tissue loss. Subcutaneous fat may be visible but bone, tendon or muscle are not exposed   Ankle-Brachial Index:    Pulses:    Removal Indication and Assessment: closed/resurfaced   Wound Outcome: Healed   (Retired) Wound Length (cm):    (Retired) Wound Width (cm):    (Retired) Depth (cm):    Wound Description (Comments):    Removal Indications:    Removed 06/27/23 1505   Wound Image   06/27/23 1505   Dressing Appearance Intact;Dry;Clean 06/27/23 1505   Drainage Amount None 06/27/23 1505   Appearance Closed/resurfaced 06/27/23 1505   Periwound Area Scar tissue 06/27/23 1505   Wound Length (cm) 0 cm 06/27/23 1505   Wound Width (cm) 0 cm 06/27/23 1505   Wound Depth (cm) 0 cm 06/27/23 1505   Wound Volume (cm^3) 0 cm^3 06/27/23 1505   Wound Surface Area (cm^2) 0 cm^2 06/27/23 1505   Care Cleansed with:;Antimicrobial agent 06/27/23 1505   Dressing Applied;Gauze;Rolled gauze 06/27/23 1505   Compression Tubular elasticized bandage 06/27/23 1505         Assessment:     Today right ischial area with less slough.   Granulation.  No drainage.  Sacral area with again improving granulation.  Left trochanter is smaller.  With improving granulation.  Right heel with improving granulation and epithelialization.  All wounds were cleaned and Opticel Ag applied change right heel every 3 days and other wounds daily.  Patient will return in 2 weeks for MD visit    ICD-10-CM ICD-9-CM   1. 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 Pressure injury of right ischium, stage 4  L89.314 707.05     707.24   3. Pressure injury of right heel, stage 3  L89.613 707.07     707.23   4. 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, stage 4, unspecified laterality  L89.44 707.09     707.24   5. Bone infection  M86.9 730.90         Plan:   Tissue pathology and/or culture taken:  [] Yes [x] No   Sharp debridement performed:   [] Yes [x] No   Labs ordered this visit:   [] Yes [x] No   Imaging ordered this visit:   [] Yes [x] No           Orders Placed This Encounter   Procedures    Change dressing     Cleanse wounds with: NS or wound cleanser  Periwound care: Skin prep periwounds, allow to dry  Primary dressing: Apply Opticel Ag to all open wounds - Home health to make sure you have adequate sizes of supplies based on wound size  Secondary dressing: Apply gauze, abd pads, medfix tape to trochanter, ischium and sacrum  Cover dressing for foot - gauze, abd, kerlix, with kerlix.  Offloading: Offload as much as possible  Edema control: Elevation; Tubigrip F to RLE   Frequency: Change Sacrum., L trochanter and R ischium daily,  Change R heel every 3 days   Follow-up:2 weeks for md visit   Other Orders: Keep wound vac to R ischium and L trochanter on hold           Follow up in about 2 weeks (around 7/11/2023) for md visit .

## 2023-07-11 ENCOUNTER — HOSPITAL ENCOUNTER (OUTPATIENT)
Dept: WOUND CARE | Facility: HOSPITAL | Age: 58
Discharge: HOME | End: 2023-07-11
Attending: NURSE PRACTITIONER
Payer: MEDICARE

## 2023-07-11 VITALS — HEART RATE: 69 BPM | TEMPERATURE: 98 F | OXYGEN SATURATION: 99 % | RESPIRATION RATE: 18 BRPM

## 2023-07-11 DIAGNOSIS — L89.314 PRESSURE INJURY OF RIGHT ISCHIUM, STAGE 4: ICD-10-CM

## 2023-07-11 DIAGNOSIS — L89.44 PRESSURE INJURY OF CONTIGUOUS REGION INVOLVING BACK AND BUTTOCK, STAGE 4, UNSPECIFIED LATERALITY: ICD-10-CM

## 2023-07-11 DIAGNOSIS — L89.613 PRESSURE INJURY OF RIGHT HEEL, STAGE 3: ICD-10-CM

## 2023-07-11 DIAGNOSIS — L89.224 PRESSURE INJURY OF TROCHANTERIC REGION OF LEFT HIP, STAGE 4: Primary | ICD-10-CM

## 2023-07-11 DIAGNOSIS — M86.9 BONE INFECTION: ICD-10-CM

## 2023-07-11 PROCEDURE — 99213 PR OFFICE/OUTPT VISIT, EST, LEVL III, 20-29 MIN: ICD-10-PCS | Mod: ,,, | Performed by: PEDIATRICS

## 2023-07-11 PROCEDURE — 27000999 HC MEDICAL RECORD PHOTO DOCUMENTATION

## 2023-07-11 PROCEDURE — 99214 OFFICE O/P EST MOD 30 MIN: CPT

## 2023-07-11 PROCEDURE — 99213 OFFICE O/P EST LOW 20 MIN: CPT | Mod: ,,, | Performed by: PEDIATRICS

## 2023-07-11 NOTE — PROGRESS NOTES
Subjective:       Patient ID: Antonio Galvan II is a 57 y.o. male.    Chief Complaint: Pressure Ulcer (R ischium , L trochanter, sacrum, R foot plantar pressure ulceration.   Here for evaluation and treatment)    HPI  Review of Systems      Objective:      Temp:  [97.9 °F (36.6 °C)]   Pulse:  [69]   Resp:  [18]   SpO2:  [99 %]   Physical Exam       Altered Skin Integrity 05/06/22 1140 Right Heel  Full thickness tissue loss. Subcutaneous fat may be visible but bone, tendon or muscle are not exposed (Active)   05/06/22 1140   Altered Skin Integrity Present on Admission - Did Patient arrive to the hospital with altered skin?: yes   Side: Right   Orientation:    Location: Heel   Wound Number:    Is this injury device related?: No   Primary Wound Type:    Description of Altered Skin Integrity: Full thickness tissue loss. Subcutaneous fat may be visible but bone, tendon or muscle are not exposed   Ankle-Brachial Index:    Pulses:    Removal Indication and Assessment:    Wound Outcome:    (Retired) Wound Length (cm):    (Retired) Wound Width (cm):    (Retired) Depth (cm):    Wound Description (Comments):    Removal Indications:    Wound Image   07/11/23 1528   Description of Altered Skin Integrity Full thickness tissue loss. Subcutaneous fat may be visible but bone, tendon or muscle are not exposed 07/11/23 1528   Dressing Appearance Intact;Moist drainage 07/11/23 1528   Drainage Amount Moderate 07/11/23 1528   Drainage Characteristics/Odor Sanguineous;Bleeding controlled 07/11/23 1528   Appearance Red;Granulating;Ecchymotic 07/11/23 1528   Tissue loss description Full thickness 07/11/23 1528   Red (%), Wound Tissue Color 100 % 07/11/23 1528   Periwound Area Scar tissue;Dry 07/11/23 1528   Wound Edges Defined 07/11/23 1528   Wound Length (cm) 5.5 cm 07/11/23 1528   Wound Width (cm) 2.5 cm 07/11/23 1528   Wound Depth (cm) 0.1 cm 07/11/23 1528   Wound Volume (cm^3) 1.375 cm^3 07/11/23 1528   Wound Surface Area (cm^2)  13.75 cm^2 07/11/23 1528   Care Cleansed with:;Antimicrobial agent 07/11/23 1528   Dressing Applied;Hydrofiber;Gauze;Absorptive Pad;Silver;Other (comment) 07/11/23 1528   Periwound Care Skin barrier film applied 07/11/23 1528   Compression Tubular elasticized bandage 07/11/23 1528            Altered Skin Integrity 01/12/23 1530 Sacral spine Full thickness tissue loss with exposed bone, tendon, or muscle. Often includes undermining and tunneling. May extend into muscle and/or supporting structures. (Active)   01/12/23 1530   Altered Skin Integrity Present on Admission - Did Patient arrive to the hospital with altered skin?: yes   Side:    Orientation:    Location: Sacral spine   Wound Number:    Is this injury device related?:    Primary Wound Type:    Description of Altered Skin Integrity: Full thickness tissue loss with exposed bone, tendon, or muscle. Often includes undermining and tunneling. May extend into muscle and/or supporting structures.   Ankle-Brachial Index:    Pulses:    Removal Indication and Assessment:    Wound Outcome:    (Retired) Wound Length (cm):    (Retired) Wound Width (cm):    (Retired) Depth (cm):    Wound Description (Comments):    Removal Indications:    Wound Image   07/11/23 1528   Description of Altered Skin Integrity Full thickness tissue loss. Subcutaneous fat may be visible but bone, tendon or muscle are not exposed 07/11/23 1528   Dressing Appearance Intact;Moist drainage 07/11/23 1528   Drainage Amount Moderate 07/11/23 1528   Drainage Characteristics/Odor Serosanguineous;No odor 07/11/23 1528   Appearance Pink;Red;Granulating;Ecchymotic;Slough;Gray 07/11/23 1528   Tissue loss description Full thickness 07/11/23 1528   Red (%), Wound Tissue Color 85 % 07/11/23 1528   Periwound Area Scar tissue;Excoriated;Denuded 07/11/23 1528   Wound Edges Irregular;Jagged 07/11/23 1528   Wound Length (cm) 5 cm 07/11/23 1528   Wound Width (cm) 2 cm 07/11/23 1528   Wound Depth (cm) 1 cm 07/11/23  1528   Wound Volume (cm^3) 10 cm^3 07/11/23 1528   Wound Surface Area (cm^2) 10 cm^2 07/11/23 1528   Care Cleansed with:;Antimicrobial agent 07/11/23 1528   Dressing Applied;Hydrofiber;Silver;Gauze;Absorptive Pad;Other (comment) 07/11/23 1528   Periwound Care Skin barrier film applied 07/11/23 1528   Off Loading Other (see comments) 07/11/23 1528            Altered Skin Integrity 01/12/23 1532 Right Ischial tuberosity Full thickness tissue loss with exposed bone, tendon, or muscle. Often includes undermining and tunneling. May extend into muscle and/or supporting structures. (Active)   01/12/23 1532   Altered Skin Integrity Present on Admission - Did Patient arrive to the hospital with altered skin?: yes   Side: Right   Orientation:    Location: Ischial tuberosity   Wound Number:    Is this injury device related?:    Primary Wound Type:    Description of Altered Skin Integrity: Full thickness tissue loss with exposed bone, tendon, or muscle. Often includes undermining and tunneling. May extend into muscle and/or supporting structures.   Ankle-Brachial Index:    Pulses:    Removal Indication and Assessment:    Wound Outcome:    (Retired) Wound Length (cm):    (Retired) Wound Width (cm):    (Retired) Depth (cm):    Wound Description (Comments):    Removal Indications:    Wound Image   07/11/23 1528   Description of Altered Skin Integrity Full thickness tissue loss with exposed bone, tendon, or muscle. Often includes undermining and tunneling. May extend into muscle and/or supporting structures. 07/11/23 1528   Dressing Appearance Intact;Moist drainage 07/11/23 1528   Drainage Amount Large 07/11/23 1528   Drainage Characteristics/Odor Serosanguineous;No odor;Bleeding controlled;Clots 07/11/23 1528   Appearance Red;Granulating;Ecchymotic;Necrotic;Tan;Slough 07/11/23 1528   Tissue loss description Full thickness 07/11/23 1528   Red (%), Wound Tissue Color 55 % 07/11/23 1528   Yellow (%), Wound Tissue Color 25 % 07/11/23  1528   Periwound Area Denuded;Excoriated;Scar tissue;Macerated 07/11/23 1528   Wound Edges Defined 07/11/23 1528   Wound Length (cm) 15 cm 07/11/23 1528   Wound Width (cm) 16 cm 07/11/23 1528   Wound Depth (cm) 3.8 cm 07/11/23 1528   Wound Volume (cm^3) 912 cm^3 07/11/23 1528   Wound Surface Area (cm^2) 240 cm^2 07/11/23 1528   Care Cleansed with:;Antimicrobial agent 07/11/23 1528   Dressing Applied;Hydrofiber;Silver;Gauze;Absorptive Pad;Other (comment) 07/11/23 1528   Periwound Care Skin barrier film applied 07/11/23 1528   Off Loading Other (see comments) 07/11/23 1528            Altered Skin Integrity Left Greater trochanter Full thickness tissue loss. Base is covered by slough and/or eschar in the wound bed (Active)       Altered Skin Integrity Present on Admission - Did Patient arrive to the hospital with altered skin?: yes   Side: Left   Orientation:    Location: Greater trochanter   Wound Number:    Is this injury device related?:    Primary Wound Type:    Description of Altered Skin Integrity: Full thickness tissue loss. Base is covered by slough and/or eschar in the wound bed   Ankle-Brachial Index:    Pulses:    Removal Indication and Assessment:    Wound Outcome:    (Retired) Wound Length (cm):    (Retired) Wound Width (cm):    (Retired) Depth (cm):    Wound Description (Comments):    Removal Indications:    Wound Image   07/11/23 1528   Description of Altered Skin Integrity Full thickness tissue loss with exposed bone, tendon, or muscle. Often includes undermining and tunneling. May extend into muscle and/or supporting structures. 07/11/23 1528   Dressing Appearance Intact;Moist drainage 07/11/23 1528   Drainage Amount Large 07/11/23 1528   Drainage Characteristics/Odor Creamy;Tan;No odor;Bleeding controlled 07/11/23 1528   Appearance Red;Granulating;Slough;Yellow;Tan;Gray;Muscle 07/11/23 1528   Tissue loss description Full thickness 07/11/23 1528   Red (%), Wound Tissue Color 75 % 07/11/23 1528   Yellow  (%), Wound Tissue Color 25 % 07/11/23 1528   Periwound Area Intact 07/11/23 1528   Wound Edges Defined 07/11/23 1528   Wound Length (cm) 2.2 cm 07/11/23 1528   Wound Width (cm) 2.2 cm 07/11/23 1528   Wound Depth (cm) 2.3 cm 07/11/23 1528   Wound Volume (cm^3) 11.132 cm^3 07/11/23 1528   Wound Surface Area (cm^2) 4.84 cm^2 07/11/23 1528   Undermining (depth (cm)/location) from 6 to 12 o'clock / 3.8cm at 9 o'clock 07/11/23 1528   Care Cleansed with:;Antimicrobial agent 07/11/23 1528   Dressing Applied;Gauze;Absorptive Pad;Other (comment) 07/11/23 1528   Packing other (see comment) 07/11/23 1528   Periwound Care Skin barrier film applied 07/11/23 1528         Assessment:     Today right trochanter wound is worsened with hematoma.  No evidence of infection at this time.  There is also some necrotic tissue.  Wound of sacrum is all so larger.  Patient has slight enlargement of right heel wound also.  Left trochanter with <opening but is also having some deterioration.  Plan will be today to discontinue the wound VAC.  Opticel Ag was applied to right ischium sacrum and right heel ulceration.  Santyl and Mesalt applied to left trochanter.  Change Sacrum left trochanter and right ischium daily and right heel every 3 days.  Patient will return in 1 week for MD visit    ICD-10-CM ICD-9-CM   1. 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 Pressure injury of right ischium, stage 4  L89.314 707.05     707.24   3. Pressure injury of right heel, stage 3  L89.613 707.07     707.23   4. 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, stage 4, unspecified laterality  L89.44 707.09     707.24   5. Bone infection  M86.9 730.90         Plan:   Tissue pathology and/or culture taken:  [] Yes [x] No   Sharp debridement performed:   [] Yes [x] No   Labs ordered this visit:   [] Yes [x] No   Imaging ordered this visit:   [] Yes [x] No           Orders Placed This Encounter    Procedures    Change dressing     Cleanse wounds with: NS or wound cleanser  Periwound care: Skin prep periwounds, allow to dry  Primary dressing: Apply Opticel Ag to R ischium, Sacrum, R heel ulceration  Apply Santyl / Mesalt to L trochanter including undermining. - Home health to make sure you have adequate sizes of supplies based on wound size  Secondary dressing: Apply gauze, abd pads, medfix tape to trochanter, ischium and sacrum  Cover dressing for foot - gauze, abd, kerlix, with kerlix.  Offloading: Offload as much as possible  Edema control: Elevation; Tubigrip F to RLE   Frequency: Change Sacrum., L trochanter and R ischium daily,  Change R heel every 3 days   Follow-up: 1 week for md visit   Other Orders: Discontinue wound vac and send back to [facility]           Follow up in about 1 week (around 7/18/2023) for md visit .

## 2023-07-11 NOTE — PATIENT INSTRUCTIONS
Cleanse wounds with: NS or wound cleanser   Periwound care: Skin prep periwounds, allow to dry   Primary dressing: Apply Opticel Ag to R ischium, Sacrum, R heel ulceration   Apply Santyl / Mesalt to L trochanter including undermining. - Home health to make sure you have adequate sizes of supplies based on wound size   Secondary dressing: Apply gauze, abd pads, medfix tape to trochanter, ischium and sacrum   Cover dressing for foot - gauze, abd, kerlix, with kerlix.  Offloading: Offload as much as possible   Edema control: Elevation; Tubigrip F to RLE   Frequency: Change Sacrum., L trochanter and R ischium daily,  Change R heel every 3 days   Follow-up: 1 week for md visit    Other Orders: Discontinue wound vac and send back to [facility]

## 2023-07-18 ENCOUNTER — HOSPITAL ENCOUNTER (OUTPATIENT)
Dept: WOUND CARE | Facility: HOSPITAL | Age: 58
Discharge: HOME | End: 2023-07-18
Attending: NURSE PRACTITIONER
Payer: MEDICARE

## 2023-07-18 VITALS — TEMPERATURE: 99 F | OXYGEN SATURATION: 97 % | HEART RATE: 65 BPM | RESPIRATION RATE: 18 BRPM

## 2023-07-18 DIAGNOSIS — L89.613 PRESSURE INJURY OF RIGHT HEEL, STAGE 3: ICD-10-CM

## 2023-07-18 DIAGNOSIS — L89.314 PRESSURE INJURY OF RIGHT ISCHIUM, STAGE 4: ICD-10-CM

## 2023-07-18 DIAGNOSIS — L89.44 PRESSURE INJURY OF CONTIGUOUS REGION INVOLVING BACK AND BUTTOCK, STAGE 4, UNSPECIFIED LATERALITY: ICD-10-CM

## 2023-07-18 DIAGNOSIS — L89.224 PRESSURE INJURY OF TROCHANTERIC REGION OF LEFT HIP, STAGE 4: Primary | ICD-10-CM

## 2023-07-18 PROCEDURE — 99214 OFFICE O/P EST MOD 30 MIN: CPT

## 2023-07-18 PROCEDURE — 99213 PR OFFICE/OUTPT VISIT, EST, LEVL III, 20-29 MIN: ICD-10-PCS | Mod: ,,, | Performed by: PEDIATRICS

## 2023-07-18 PROCEDURE — 27000999 HC MEDICAL RECORD PHOTO DOCUMENTATION

## 2023-07-18 PROCEDURE — 99213 OFFICE O/P EST LOW 20 MIN: CPT | Mod: ,,, | Performed by: PEDIATRICS

## 2023-07-18 NOTE — PATIENT INSTRUCTIONS
Cleanse wounds with: NS or wound cleanser   Periwound care: Skin prep periwounds, allow to dry   Primary dressing: Apply Opticel Ag to R ischium, Sacrum, R heel ulceration   Apply Vashe moistened kerlix gauze 4x4(fluffed- only use 1 per day), to L trochanter including undermining. - Home health to make sure you have adequate sizes of supplies based on wound size   Secondary dressing: Apply gauze, abd pads, medfix tape to trochanter, ischium and sacrum   Cover dressing for foot - gauze, abd, kerlix, with kerlix.  Offloading: Offload as much as possible   Edema control: Elevation; Tubigrip F to RLE   Frequency: Change Sacrum., L trochanter and R ischium daily,  Change R heel every 3 days   Follow-up: 1 week for md visit

## 2023-07-18 NOTE — PROGRESS NOTES
Subjective:       Patient ID: Antonio Galvan II is a 57 y.o. male.    Chief Complaint: Pressure Ulcer (Multiple pressure ulcers, R ischium , sacrum, R heel and L trochanter.   Follow up with md for re-evaluation and treatment)    HPI  Review of Systems      Objective:      Temp:  [99 °F (37.2 °C)]   Pulse:  [65]   Resp:  [18]   SpO2:  [97 %]   Physical Exam       Altered Skin Integrity 05/06/22 1140 Right Heel  Full thickness tissue loss. Subcutaneous fat may be visible but bone, tendon or muscle are not exposed (Active)   05/06/22 1140   Altered Skin Integrity Present on Admission - Did Patient arrive to the hospital with altered skin?: yes   Side: Right   Orientation:    Location: Heel   Wound Number:    Is this injury device related?: No   Primary Wound Type:    Description of Altered Skin Integrity: Full thickness tissue loss. Subcutaneous fat may be visible but bone, tendon or muscle are not exposed   Ankle-Brachial Index:    Pulses:    Removal Indication and Assessment:    Wound Outcome:    (Retired) Wound Length (cm):    (Retired) Wound Width (cm):    (Retired) Depth (cm):    Wound Description (Comments):    Removal Indications:    Wound Image   07/18/23 1524   Description of Altered Skin Integrity Full thickness tissue loss. Subcutaneous fat may be visible but bone, tendon or muscle are not exposed 07/18/23 1524   Dressing Appearance Intact;Moist drainage 07/18/23 1524   Drainage Amount Moderate 07/18/23 1524   Drainage Characteristics/Odor Serosanguineous 07/18/23 1524   Appearance Red;Granulating 07/18/23 1524   Tissue loss description Full thickness 07/18/23 1524   Red (%), Wound Tissue Color 100 % 07/18/23 1524   Periwound Area Scar tissue;Dry 07/18/23 1524   Wound Edges Defined 07/18/23 1524   Wound Length (cm) 4.3 cm 07/18/23 1524   Wound Width (cm) 1.5 cm 07/18/23 1524   Wound Depth (cm) 0.1 cm 07/18/23 1524   Wound Volume (cm^3) 0.645 cm^3 07/18/23 1524   Wound Surface Area (cm^2) 6.45 cm^2  07/18/23 1524   Care Cleansed with:;Antimicrobial agent 07/18/23 1524   Dressing Applied;Hydrofiber;Silver;Gauze;Absorptive Pad;Rolled gauze;Tubular bandage 07/18/23 1524   Periwound Care Skin barrier film applied 07/18/23 1524   Compression Tubular elasticized bandage 07/18/23 1524            Altered Skin Integrity 01/12/23 1530 Sacral spine Full thickness tissue loss with exposed bone, tendon, or muscle. Often includes undermining and tunneling. May extend into muscle and/or supporting structures. (Active)   01/12/23 1530   Altered Skin Integrity Present on Admission - Did Patient arrive to the hospital with altered skin?: yes   Side:    Orientation:    Location: Sacral spine   Wound Number:    Is this injury device related?:    Primary Wound Type:    Description of Altered Skin Integrity: Full thickness tissue loss with exposed bone, tendon, or muscle. Often includes undermining and tunneling. May extend into muscle and/or supporting structures.   Ankle-Brachial Index:    Pulses:    Removal Indication and Assessment:    Wound Outcome:    (Retired) Wound Length (cm):    (Retired) Wound Width (cm):    (Retired) Depth (cm):    Wound Description (Comments):    Removal Indications:    Wound Image   07/18/23 1524   Description of Altered Skin Integrity Full thickness tissue loss. Subcutaneous fat may be visible but bone, tendon or muscle are not exposed 07/18/23 1524   Dressing Appearance Intact;Moist drainage 07/18/23 1524   Drainage Amount Moderate 07/18/23 1524   Drainage Characteristics/Odor Serosanguineous;No odor;Bleeding controlled 07/18/23 1524   Appearance Red;Granulating;Pink;Not granulating 07/18/23 1524   Tissue loss description Full thickness 07/18/23 1524   Red (%), Wound Tissue Color 100 % 07/18/23 1524   Periwound Area Scar tissue;Intact 07/18/23 1524   Wound Edges Irregular 07/18/23 1524   Wound Length (cm) 2.5 cm 07/18/23 1524   Wound Width (cm) 1.5 cm 07/18/23 1524   Wound Depth (cm) 1 cm 07/18/23  1524   Wound Volume (cm^3) 3.75 cm^3 07/18/23 1524   Wound Surface Area (cm^2) 3.75 cm^2 07/18/23 1524   Care Cleansed with:;Antimicrobial agent 07/18/23 1524   Dressing Applied;Hydrofiber;Silver;Gauze;Absorptive Pad;Other (comment) 07/18/23 1524   Periwound Care Skin barrier film applied 07/18/23 1524   Off Loading Other (see comments) 07/18/23 1524            Altered Skin Integrity 01/12/23 1532 Right Ischial tuberosity Full thickness tissue loss with exposed bone, tendon, or muscle. Often includes undermining and tunneling. May extend into muscle and/or supporting structures. (Active)   01/12/23 1532   Altered Skin Integrity Present on Admission - Did Patient arrive to the hospital with altered skin?: yes   Side: Right   Orientation:    Location: Ischial tuberosity   Wound Number:    Is this injury device related?:    Primary Wound Type:    Description of Altered Skin Integrity: Full thickness tissue loss with exposed bone, tendon, or muscle. Often includes undermining and tunneling. May extend into muscle and/or supporting structures.   Ankle-Brachial Index:    Pulses:    Removal Indication and Assessment:    Wound Outcome:    (Retired) Wound Length (cm):    (Retired) Wound Width (cm):    (Retired) Depth (cm):    Wound Description (Comments):    Removal Indications:    Wound Image   07/18/23 1524   Description of Altered Skin Integrity Full thickness tissue loss. Base is covered by slough and/or eschar in the wound bed 07/18/23 1524   Dressing Appearance Intact;Moist drainage 07/18/23 1524   Drainage Amount Large 07/18/23 1524   Drainage Characteristics/Odor Serosanguineous 07/18/23 1524   Appearance Red;Granulating;Gray;Black;Tan;Slough;Purple;Ecchymotic;Other (see comments) 07/18/23 1524   Tissue loss description Full thickness 07/18/23 1524   Red (%), Wound Tissue Color 70 % 07/18/23 1524   Yellow (%), Wound Tissue Color 30 % 07/18/23 1524   Periwound Area Ecchymotic;Denuded 07/18/23 1524   Wound Edges  Defined 07/18/23 1524   Wound Length (cm) 15 cm 07/18/23 1524   Wound Width (cm) 15 cm 07/18/23 1524   Wound Depth (cm) 2.7 cm 07/18/23 1524   Wound Volume (cm^3) 607.5 cm^3 07/18/23 1524   Wound Surface Area (cm^2) 225 cm^2 07/18/23 1524   Care Cleansed with:;Antimicrobial agent 07/18/23 1524   Dressing Applied;Hydrofiber;Silver;Gauze;Absorptive Pad;Other (comment) 07/18/23 1524   Periwound Care Skin barrier film applied 07/18/23 1524   Off Loading Other (see comments) 07/18/23 1524            Altered Skin Integrity Left Greater trochanter Full thickness tissue loss. Base is covered by slough and/or eschar in the wound bed (Active)       Altered Skin Integrity Present on Admission - Did Patient arrive to the hospital with altered skin?: yes   Side: Left   Orientation:    Location: Greater trochanter   Wound Number:    Is this injury device related?:    Primary Wound Type:    Description of Altered Skin Integrity: Full thickness tissue loss. Base is covered by slough and/or eschar in the wound bed   Ankle-Brachial Index:    Pulses:    Removal Indication and Assessment:    Wound Outcome:    (Retired) Wound Length (cm):    (Retired) Wound Width (cm):    (Retired) Depth (cm):    Wound Description (Comments):    Removal Indications:    Wound Image   07/18/23 1524   Description of Altered Skin Integrity Full thickness tissue loss with exposed bone, tendon, or muscle. Often includes undermining and tunneling. May extend into muscle and/or supporting structures. 07/18/23 1524   Dressing Appearance Intact;Moist drainage 07/18/23 1524   Drainage Amount Moderate 07/18/23 1524   Drainage Characteristics/Odor Green 07/18/23 1524   Appearance Red;Granulating;Yellow;Slough;Muscle 07/18/23 1524   Tissue loss description Full thickness 07/18/23 1524   Red (%), Wound Tissue Color 50 % 07/18/23 1524   Yellow (%), Wound Tissue Color 50 % 07/18/23 1524   Periwound Area Intact 07/18/23 1524   Wound Edges Defined 07/18/23 1524   Wound  Length (cm) 2.2 cm 07/18/23 1524   Wound Width (cm) 2 cm 07/18/23 1524   Wound Depth (cm) 1.5 cm 07/18/23 1524   Wound Volume (cm^3) 6.6 cm^3 07/18/23 1524   Wound Surface Area (cm^2) 4.4 cm^2 07/18/23 1524   Undermining (depth (cm)/location) 360 degrees / 4cm at 3 o'clock 07/18/23 1524   Care Cleansed with:;Antimicrobial agent 07/18/23 1524   Dressing Applied;Gauze;Absorptive Pad;Gauze, wet to dry 07/18/23 1524   Packing packed with;other (see comment) 07/18/23 1524   Periwound Care Skin barrier film applied 07/18/23 1524         Assessment:     Today wound of right ischium has less hematoma and much more granulation tissue.  There is still large areas of slough.  This does appear to be more improved.  Sacral area is more granulated today.  Right heel is also somewhat smaller.  These areas were cleaned and Opticel Ag was applied to these areas.  Gauze and ABD pads were used for secondary dressings.  These dressings will be changed daily.  Left trochanter area has some greenish drainage.  There is red granulating tissue with some yellow slough and exposed muscle.  This area was cleaned and Vashe moistened Kerlix gauze were applied.  Dressings will be changed daily.  Return in 1 week for MD visit    ICD-10-CM ICD-9-CM   1. 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 Pressure injury of right ischium, stage 4  L89.314 707.05     707.24   3. Pressure injury of right heel, stage 3  L89.613 707.07     707.23   4. 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, stage 4, unspecified laterality  L89.44 707.09     707.24         Plan:   Tissue pathology and/or culture taken:  [] Yes [x] No   Sharp debridement performed:   [] Yes [x] No   Labs ordered this visit:   [] Yes [x] No   Imaging ordered this visit:   [] Yes [x] No           Orders Placed This Encounter   Procedures    Change dressing     Cleanse wounds with: NS or wound cleanser   Periwound care: Skin  prep periwounds, allow to dry   Primary dressing: Apply Opticel Ag to R ischium, Sacrum, R heel ulceration   Apply Vashe moistened kerlix gauze 4x4, to L trochanter including undermining. - Home health to make sure you have adequate sizes of supplies based on wound size   Secondary dressing: Apply gauze, abd pads, medfix tape to trochanter, ischium and sacrum   Cover dressing for foot - gauze, abd, kerlix, with kerlix.  Offloading: Offload as much as possible   Edema control: Elevation; Tubigrip F to RLE   Frequency: Change Sacrum., L trochanter and R ischium daily,  Change R heel every 3 days   Follow-up: 1 week for md visit          Follow up in about 1 week (around 7/25/2023) for md visit .

## 2023-08-01 ENCOUNTER — HOSPITAL ENCOUNTER (OUTPATIENT)
Dept: WOUND CARE | Facility: HOSPITAL | Age: 58
Discharge: HOME | End: 2023-08-01
Attending: NURSE PRACTITIONER
Payer: MEDICARE

## 2023-08-01 VITALS — OXYGEN SATURATION: 99 % | TEMPERATURE: 100 F | HEART RATE: 75 BPM | RESPIRATION RATE: 18 BRPM

## 2023-08-01 DIAGNOSIS — L89.224 PRESSURE INJURY OF TROCHANTERIC REGION OF LEFT HIP, STAGE 4: Primary | ICD-10-CM

## 2023-08-01 DIAGNOSIS — L89.314 PRESSURE INJURY OF RIGHT ISCHIUM, STAGE 4: ICD-10-CM

## 2023-08-01 DIAGNOSIS — L89.520 PRESSURE INJURY OF LEFT ANKLE, UNSTAGEABLE: ICD-10-CM

## 2023-08-01 DIAGNOSIS — L89.613 PRESSURE INJURY OF RIGHT HEEL, STAGE 3: ICD-10-CM

## 2023-08-01 DIAGNOSIS — L89.44 PRESSURE INJURY OF CONTIGUOUS REGION INVOLVING BACK AND BUTTOCK, STAGE 4, UNSPECIFIED LATERALITY: ICD-10-CM

## 2023-08-01 PROCEDURE — 99213 OFFICE O/P EST LOW 20 MIN: CPT | Mod: ,,, | Performed by: PEDIATRICS

## 2023-08-01 PROCEDURE — 99213 PR OFFICE/OUTPT VISIT, EST, LEVL III, 20-29 MIN: ICD-10-PCS | Mod: ,,, | Performed by: PEDIATRICS

## 2023-08-01 PROCEDURE — 27000999 HC MEDICAL RECORD PHOTO DOCUMENTATION

## 2023-08-01 PROCEDURE — 99213 OFFICE O/P EST LOW 20 MIN: CPT

## 2023-08-01 NOTE — PROGRESS NOTES
Subjective:       Patient ID: Antonio Galvan II is a 57 y.o. male.    Chief Complaint: Pressure Ulcer (Multiple pressure ulcers, R ischium , sacrum, R heel and L trochanter.   Patient and caregiver report new ulcers to L ankle , noticed in the last week.   Follow up with md for re-evaluation and treatment.)    HPI  Review of Systems      Objective:      Temp:  [99.6 °F (37.6 °C)]   Pulse:  [75]   Resp:  [18]   SpO2:  [99 %]   Physical Exam       Altered Skin Integrity 05/06/22 1140 Right Heel  Full thickness tissue loss. Subcutaneous fat may be visible but bone, tendon or muscle are not exposed (Active)   05/06/22 1140   Altered Skin Integrity Present on Admission - Did Patient arrive to the hospital with altered skin?: yes   Side: Right   Orientation:    Location: Heel   Wound Number:    Is this injury device related?: No   Primary Wound Type:    Description of Altered Skin Integrity: Full thickness tissue loss. Subcutaneous fat may be visible but bone, tendon or muscle are not exposed   Ankle-Brachial Index:    Pulses:    Removal Indication and Assessment:    Wound Outcome:    (Retired) Wound Length (cm):    (Retired) Wound Width (cm):    (Retired) Depth (cm):    Wound Description (Comments):    Removal Indications:    Wound Image   08/01/23 1502   Dressing Appearance Intact;Moist drainage 08/01/23 1502   Drainage Amount Moderate 08/01/23 1502   Drainage Characteristics/Odor Serosanguineous 08/01/23 1502   Appearance Red;Granulating;Not granulating 08/01/23 1502   Tissue loss description Full thickness 08/01/23 1502   Red (%), Wound Tissue Color 100 % 08/01/23 1502   Periwound Area Scar tissue 08/01/23 1502   Wound Edges Callused;Dehisced;Irregular 08/01/23 1502   Wound Length (cm) 4.5 cm 08/01/23 1502   Wound Width (cm) 1 cm 08/01/23 1502   Wound Depth (cm) 0.1 cm 08/01/23 1502   Wound Volume (cm^3) 0.45 cm^3 08/01/23 1502   Wound Surface Area (cm^2) 4.5 cm^2 08/01/23 1502   Care Cleansed  with:;Antimicrobial agent 08/01/23 1502   Dressing Applied;Hydrofiber;Silver;Gauze;Absorptive Pad;Rolled gauze;Tubular bandage 08/01/23 1502   Periwound Care Skin barrier film applied;Dry periwound area maintained 08/01/23 1502   Compression Tubular elasticized bandage 08/01/23 1502            Altered Skin Integrity 01/12/23 1530 Sacral spine Full thickness tissue loss with exposed bone, tendon, or muscle. Often includes undermining and tunneling. May extend into muscle and/or supporting structures. (Active)   01/12/23 1530   Altered Skin Integrity Present on Admission - Did Patient arrive to the hospital with altered skin?: yes   Side:    Orientation:    Location: Sacral spine   Wound Number:    Is this injury device related?:    Primary Wound Type:    Description of Altered Skin Integrity: Full thickness tissue loss with exposed bone, tendon, or muscle. Often includes undermining and tunneling. May extend into muscle and/or supporting structures.   Ankle-Brachial Index:    Pulses:    Removal Indication and Assessment:    Wound Outcome:    (Retired) Wound Length (cm):    (Retired) Wound Width (cm):    (Retired) Depth (cm):    Wound Description (Comments):    Removal Indications:    Wound Image   08/01/23 1502   Dressing Appearance Intact;Moist drainage 08/01/23 1502   Drainage Amount Moderate 08/01/23 1502   Drainage Characteristics/Odor Serosanguineous;No odor;Bleeding controlled 08/01/23 1502   Appearance Red;Granulating 08/01/23 1502   Tissue loss description Full thickness 08/01/23 1502   Red (%), Wound Tissue Color 100 % 08/01/23 1502   Periwound Area Intact;Scar tissue 08/01/23 1502   Wound Edges Irregular 08/01/23 1502   Wound Length (cm) 2.8 cm 08/01/23 1502   Wound Width (cm) 2.2 cm 08/01/23 1502   Wound Depth (cm) 0.4 cm 08/01/23 1502   Wound Volume (cm^3) 2.464 cm^3 08/01/23 1502   Wound Surface Area (cm^2) 6.16 cm^2 08/01/23 1502   Care Cleansed with:;Antimicrobial agent 08/01/23 1502   Dressing  Applied;Hydrofiber;Silver;Gauze;Absorptive Pad 08/01/23 1502   Periwound Care Skin barrier film applied 08/01/23 1502   Off Loading Other (see comments) 08/01/23 1502            Altered Skin Integrity 01/12/23 1532 Right Ischial tuberosity Full thickness tissue loss with exposed bone, tendon, or muscle. Often includes undermining and tunneling. May extend into muscle and/or supporting structures. (Active)   01/12/23 1532   Altered Skin Integrity Present on Admission - Did Patient arrive to the hospital with altered skin?: yes   Side: Right   Orientation:    Location: Ischial tuberosity   Wound Number:    Is this injury device related?:    Primary Wound Type:    Description of Altered Skin Integrity: Full thickness tissue loss with exposed bone, tendon, or muscle. Often includes undermining and tunneling. May extend into muscle and/or supporting structures.   Ankle-Brachial Index:    Pulses:    Removal Indication and Assessment:    Wound Outcome:    (Retired) Wound Length (cm):    (Retired) Wound Width (cm):    (Retired) Depth (cm):    Wound Description (Comments):    Removal Indications:    Wound Image   08/01/23 1502   Dressing Appearance Intact;Moist drainage 08/01/23 1502   Drainage Amount Large 08/01/23 1502   Drainage Characteristics/Odor Serosanguineous 08/01/23 1502   Appearance Red;Granulating;Tan;Gray;Slough 08/01/23 1502   Tissue loss description Full thickness 08/01/23 1502   Red (%), Wound Tissue Color 80 % 08/01/23 1502   Yellow (%), Wound Tissue Color 20 % 08/01/23 1502   Periwound Area Swelling 08/01/23 1502   Wound Edges Defined 08/01/23 1502   Wound Length (cm) 11.5 cm 08/01/23 1502   Wound Width (cm) 15.5 cm 08/01/23 1502   Wound Depth (cm) 2.3 cm 08/01/23 1502   Wound Volume (cm^3) 409.975 cm^3 08/01/23 1502   Wound Surface Area (cm^2) 178.25 cm^2 08/01/23 1502   Care Cleansed with:;Antimicrobial agent 08/01/23 1502   Dressing Applied;Hydrofiber;Silver;Gauze;Absorptive Pad;Other (comment) 08/01/23  1502            Altered Skin Integrity Left Greater trochanter Full thickness tissue loss. Base is covered by slough and/or eschar in the wound bed (Active)       Altered Skin Integrity Present on Admission - Did Patient arrive to the hospital with altered skin?: yes   Side: Left   Orientation:    Location: Greater trochanter   Wound Number:    Is this injury device related?:    Primary Wound Type:    Description of Altered Skin Integrity: Full thickness tissue loss. Base is covered by slough and/or eschar in the wound bed   Ankle-Brachial Index:    Pulses:    Removal Indication and Assessment:    Wound Outcome:    (Retired) Wound Length (cm):    (Retired) Wound Width (cm):    (Retired) Depth (cm):    Wound Description (Comments):    Removal Indications:    Wound Image   08/01/23 1502   Description of Altered Skin Integrity Full thickness tissue loss with exposed bone, tendon, or muscle. Often includes undermining and tunneling. May extend into muscle and/or supporting structures. 08/01/23 1502   Dressing Appearance Intact;Moist drainage 08/01/23 1502   Drainage Amount Large 08/01/23 1502   Drainage Characteristics/Odor Creamy;Tan;No odor;Bleeding controlled 08/01/23 1502   Appearance Red;Granulating;Ryan;Gray;White 08/01/23 1502   Tissue loss description Full thickness 08/01/23 1502   Red (%), Wound Tissue Color 75 % 08/01/23 1502   Yellow (%), Wound Tissue Color 25 % 08/01/23 1502   Periwound Area Intact 08/01/23 1502   Wound Edges Defined 08/01/23 1502   Wound Length (cm) 2.5 cm 08/01/23 1502   Wound Width (cm) 2.5 cm 08/01/23 1502   Wound Depth (cm) 2.5 cm 08/01/23 1502   Wound Volume (cm^3) 15.625 cm^3 08/01/23 1502   Wound Surface Area (cm^2) 6.25 cm^2 08/01/23 1502   Undermining (depth (cm)/location) 6 to 12 o'clock / 3cm at 9 o'clock 08/01/23 1502   Care Cleansed with:;Antimicrobial agent 08/01/23 1502   Dressing Applied;Gauze;Absorptive Pad 08/01/23 1502   Packing packed with 08/01/23 1502   Periwound Care  Skin barrier film applied 08/01/23 1502            Altered Skin Integrity 08/01/23 1534 Left posterior Ankle Full thickness tissue loss. Base is covered by slough and/or eschar in the wound bed (Active)   08/01/23 1534   Altered Skin Integrity Present on Admission - Did Patient arrive to the hospital with altered skin?: yes   Side: Left   Orientation: posterior   Location: Ankle   Wound Number:    Is this injury device related?:    Primary Wound Type:    Description of Altered Skin Integrity: Full thickness tissue loss. Base is covered by slough and/or eschar in the wound bed   Ankle-Brachial Index:    Pulses: 2 + palpable, strong doppler signal per md   Removal Indication and Assessment:    Wound Outcome:    (Retired) Wound Length (cm):    (Retired) Wound Width (cm):    (Retired) Depth (cm):    Wound Description (Comments):    Removal Indications:    Wound Image    08/01/23 1502   Description of Altered Skin Integrity Full thickness tissue loss. Base is covered by slough and/or eschar in the wound bed 08/01/23 1502   Dressing Appearance Intact;Moist drainage 08/01/23 1502   Drainage Amount Small 08/01/23 1502   Drainage Characteristics/Odor Serous;No odor 08/01/23 1502   Appearance Necrotic;White;Purple;Maroon;Red;Tan;Moist;Ecchymotic 08/01/23 1502   Tissue loss description Full thickness 08/01/23 1502   Yellow (%), Wound Tissue Color 75 % 08/01/23 1502   Periwound Area Denuded;Edematous 08/01/23 1502   Wound Edges Irregular 08/01/23 1502   Wound Length (cm) 8.5 cm 08/01/23 1502   Wound Width (cm) 4.5 cm 08/01/23 1502   Wound Depth (cm) 0.1 cm 08/01/23 1502   Wound Volume (cm^3) 3.825 cm^3 08/01/23 1502   Wound Surface Area (cm^2) 38.25 cm^2 08/01/23 1502   Care Cleansed with:;Antimicrobial agent 08/01/23 1502   Dressing Applied;Sodium chloride impregnated;Gauze;Absorptive Pad;Rolled gauze 08/01/23 1502   Off Loading Other (see comments) 08/01/23 1502            Altered Skin Integrity 08/01/23 1535 Left anterior  Ankle Other (comment) Full thickness tissue loss. Base is covered by slough and/or eschar in the wound bed (Active)   08/01/23 1535   Altered Skin Integrity Present on Admission - Did Patient arrive to the hospital with altered skin?: yes   Side: Left   Orientation: anterior   Location: Ankle   Wound Number:    Is this injury device related?: No   Primary Wound Type: Other   Description of Altered Skin Integrity: Full thickness tissue loss. Base is covered by slough and/or eschar in the wound bed   Ankle-Brachial Index:    Pulses: 2+ palpable and strong doppler pulses per MD   Removal Indication and Assessment:    Wound Outcome:    (Retired) Wound Length (cm):    (Retired) Wound Width (cm):    (Retired) Depth (cm):    Wound Description (Comments):    Removal Indications:    Wound Image   08/01/23 1537   Description of Altered Skin Integrity Full thickness tissue loss. Base is covered by slough and/or eschar in the wound bed 08/01/23 1537   Dressing Appearance Dry;Intact 08/01/23 1537   Drainage Amount None 08/01/23 1537   Appearance Eschar 08/01/23 1537   Black (%), Wound Tissue Color 100 % 08/01/23 1537   Periwound Area Redness;Dry 08/01/23 1537   Wound Edges Defined 08/01/23 1537   Wound Length (cm) 3.6 cm 08/01/23 1537   Wound Width (cm) 3.5 cm 08/01/23 1537   Wound Surface Area (cm^2) 12.6 cm^2 08/01/23 1537   Care Cleansed with:;Sterile normal saline 08/01/23 1537   Dressing Applied;Other (comment);Sodium chloride impregnated;Rolled gauze;Gauze 08/01/23 1537         Assessment:     Today there is a new injury of the left ankle both the anterior and posterior.  These are end-stage will pressure ulcers.  Areas great deal of necrotic tissue and eschar.  Pulses of the foot are completely normal by Doppler.  These areas were cleaned and Santyl and Mesalt was applied.  Today the right foot ulcerations have improved.  Wound of left hip is cleaned with granulation.  Right trochanter wound is much less erythematous and  much less bruised today.  There is some good granulation.  Posterior sacral wounds are improving and are well granulated.  Today there was also a small area of the left forearm which was bleeding secondary to an abrasion.  This was improved with pressure and Omnistat.  Patient had Opticel Ag applied to right ischium sacrum and right heel ulceration.  Santyl with Mesalt was applied to the new wounds of the left heel.  Santyl and Vashe was applied to the left trochanter.  Dressings will be changed daily.  Patient will return in 1 week for MD visit    ICD-10-CM ICD-9-CM   1. 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 Pressure injury of right ischium, stage 4  L89.314 707.05     707.24   3. Pressure injury of right heel, stage 3  L89.613 707.07     707.23   4. 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, stage 4, unspecified laterality  L89.44 707.09     707.24   5. Pressure injury of left ankle, unstageable  L89.520 707.06     707.25         Plan:   Tissue pathology and/or culture taken:  [] Yes [x] No   Sharp debridement performed:   [] Yes [x] No   Labs ordered this visit:   [] Yes [x] No   Imaging ordered this visit:   [] Yes [x] No           Orders Placed This Encounter   Procedures    Change dressing     Cleanse wounds with: NS or wound cleanser   Periwound care: Skin prep periwounds, allow to dry   Primary dressing: Apply Opticel Ag to R ischium, Sacrum, R heel ulceration   ++++New site today+++Apply Santyl nickel thickness, cover with vashe moistened mesalt/gauze, / abd, kerlix daily to L anterior and posterior ankle ulcerations.   NO COMPRESSION to site    Apply Santyl, then Vashe moistened kerlix gauze 4x4(fluffed- only use 1 per day), to L trochanter including undermining    - Home health to make sure you have adequate sizes of supplies based on wound size   Secondary dressing: Apply gauze, abd pads, medfix tape to trochanter, ischium and  sacrum   Secondary dressings for both Left and R foot wounds -   - gauze, abd, kerlix.   Offloading: Offload as much as possible   Edema control: Elevation; Tubigrip F to RLE   Frequency: Change Sacrum., L trochanter and R ischium, L anterior and posterior ankle daily  Change R heel every 3 days   Follow-up: 1 week for md visit          Follow up in about 1 week (around 8/8/2023).

## 2023-08-01 NOTE — PATIENT INSTRUCTIONS
Cleanse wounds with: NS or wound cleanser   Periwound care: Skin prep periwounds, allow to dry   Primary dressing: Apply Opticel Ag to R ischium, Sacrum, R heel ulceration   ++++New site today+++Apply Santyl nickel thickness, cover with vashe moistened mesalt/gauze, / abd, kerlix daily to L anterior and posterior ankle ulcerations.   NO COMPRESSION to site    Apply Santyl, then Vashe moistened kerlix gauze 4x4(fluffed- only use 1 per day), to L trochanter including undermining    - Home health to make sure you have adequate sizes of supplies based on wound size   Secondary dressing: Apply gauze, abd pads, medfix tape to trochanter, ischium and sacrum   Secondary dressings for both Left and R foot wounds -   - gauze, abd, kerlix.   Offloading: Offload as much as possible   Edema control: Elevation; Tubigrip F to RLE   Frequency: Change Sacrum., L trochanter and R ischium, L anterior and posterior ankle daily  Change R heel every 3 days   Follow-up: 1 week for md visit

## 2023-08-08 ENCOUNTER — HOSPITAL ENCOUNTER (OUTPATIENT)
Dept: WOUND CARE | Facility: HOSPITAL | Age: 58
Discharge: HOME | End: 2023-08-08
Attending: NURSE PRACTITIONER
Payer: MEDICARE

## 2023-08-08 VITALS — RESPIRATION RATE: 20 BRPM | TEMPERATURE: 100 F | OXYGEN SATURATION: 99 % | HEART RATE: 81 BPM

## 2023-08-08 DIAGNOSIS — L89.520 PRESSURE INJURY OF LEFT ANKLE, UNSTAGEABLE: ICD-10-CM

## 2023-08-08 DIAGNOSIS — L89.44 PRESSURE INJURY OF CONTIGUOUS REGION INVOLVING BACK AND BUTTOCK, STAGE 4, UNSPECIFIED LATERALITY: ICD-10-CM

## 2023-08-08 DIAGNOSIS — S71.002D OPEN WOUND OF LEFT HIP, SUBSEQUENT ENCOUNTER: ICD-10-CM

## 2023-08-08 DIAGNOSIS — R53.1 WEAKNESS: ICD-10-CM

## 2023-08-08 DIAGNOSIS — L89.613 PRESSURE INJURY OF RIGHT HEEL, STAGE 3: ICD-10-CM

## 2023-08-08 DIAGNOSIS — D64.9 ANEMIA, UNSPECIFIED TYPE: Primary | ICD-10-CM

## 2023-08-08 DIAGNOSIS — L89.314 PRESSURE INJURY OF RIGHT ISCHIUM, STAGE 4: ICD-10-CM

## 2023-08-08 DIAGNOSIS — L89.224 PRESSURE INJURY OF TROCHANTERIC REGION OF LEFT HIP, STAGE 4: ICD-10-CM

## 2023-08-08 PROCEDURE — 99213 PR OFFICE/OUTPT VISIT, EST, LEVL III, 20-29 MIN: ICD-10-PCS | Mod: ,,, | Performed by: PEDIATRICS

## 2023-08-08 PROCEDURE — 99215 OFFICE O/P EST HI 40 MIN: CPT

## 2023-08-08 PROCEDURE — 99213 OFFICE O/P EST LOW 20 MIN: CPT | Mod: ,,, | Performed by: PEDIATRICS

## 2023-08-08 PROCEDURE — 27000999 HC MEDICAL RECORD PHOTO DOCUMENTATION

## 2023-08-08 NOTE — PROGRESS NOTES
Subjective:       Patient ID: Antonio Galvan II is a 57 y.o. male.    Chief Complaint: Pressure Ulcer (Multiple pressure ulcers, R ischium , sacrum, R heel and L trochanter.   New ulcers to L ankle both anterior and posterior noted at visit last week.  Pt reports he had a follow up visit with [facility] regarding anticoagulation therapy.   Awaiting visit note.  Follow up with md for re-evaluation and treatment )    HPI  Review of Systems      Objective:      Temp:  [100.2 °F (37.9 °C)]   Pulse:  [81]   Resp:  [20]   SpO2:  [99 %]   Physical Exam       Altered Skin Integrity 05/06/22 1140 Right Heel  Full thickness tissue loss. Subcutaneous fat may be visible but bone, tendon or muscle are not exposed (Active)   05/06/22 1140   Altered Skin Integrity Present on Admission - Did Patient arrive to the hospital with altered skin?: yes   Side: Right   Orientation:    Location: Heel   Wound Number:    Is this injury device related?: No   Primary Wound Type:    Description of Altered Skin Integrity: Full thickness tissue loss. Subcutaneous fat may be visible but bone, tendon or muscle are not exposed   Ankle-Brachial Index:    Pulses:    Removal Indication and Assessment:    Wound Outcome:    (Retired) Wound Length (cm):    (Retired) Wound Width (cm):    (Retired) Depth (cm):    Wound Description (Comments):    Removal Indications:    Wound Image   08/08/23 1531   Dressing Appearance Intact;Dry 08/08/23 1531   Drainage Amount Scant 08/08/23 1531   Drainage Characteristics/Odor Sanguineous;No odor 08/08/23 1531   Appearance Red;Not granulating 08/08/23 1531   Tissue loss description Full thickness 08/08/23 1531   Red (%), Wound Tissue Color 100 % 08/08/23 1531   Periwound Area Scar tissue;Ecchymotic;Dry 08/08/23 1531   Wound Edges Callused 08/08/23 1531   Wound Length (cm) 4.5 cm 08/08/23 1531   Wound Width (cm) 1 cm 08/08/23 1531   Wound Depth (cm) 0.1 cm 08/08/23 1531   Wound Volume (cm^3) 0.45 cm^3 08/08/23 1531   Wound  Surface Area (cm^2) 4.5 cm^2 08/08/23 1531   Care Cleansed with:;Sterile normal saline 08/08/23 1531   Dressing Applied;Hydrofiber;Silver;Gauze;Absorptive Pad;Rolled gauze;Tubular bandage 08/08/23 1531   Periwound Care Skin barrier film applied 08/08/23 1531   Compression Tubular elasticized bandage 08/08/23 1531            Altered Skin Integrity 01/12/23 1530 Sacral spine Full thickness tissue loss with exposed bone, tendon, or muscle. Often includes undermining and tunneling. May extend into muscle and/or supporting structures. (Active)   01/12/23 1530   Altered Skin Integrity Present on Admission - Did Patient arrive to the hospital with altered skin?: yes   Side:    Orientation:    Location: Sacral spine   Wound Number:    Is this injury device related?:    Primary Wound Type:    Description of Altered Skin Integrity: Full thickness tissue loss with exposed bone, tendon, or muscle. Often includes undermining and tunneling. May extend into muscle and/or supporting structures.   Ankle-Brachial Index:    Pulses:    Removal Indication and Assessment:    Wound Outcome:    (Retired) Wound Length (cm):    (Retired) Wound Width (cm):    (Retired) Depth (cm):    Wound Description (Comments):    Removal Indications:    Wound Image   08/08/23 1531   Description of Altered Skin Integrity Full thickness tissue loss. Subcutaneous fat may be visible but bone, tendon or muscle are not exposed 08/08/23 1531   Dressing Appearance Intact;Moist drainage 08/08/23 1531   Drainage Amount Moderate 08/08/23 1531   Drainage Characteristics/Odor Serosanguineous 08/08/23 1531   Appearance Red;Granulating 08/08/23 1531   Tissue loss description Full thickness 08/08/23 1531   Red (%), Wound Tissue Color 100 % 08/08/23 1531   Periwound Area Intact;Dry;Scar tissue 08/08/23 1531   Wound Edges Defined 08/08/23 1531   Wound Length (cm) 2.5 cm 08/08/23 1531   Wound Width (cm) 0.8 cm 08/08/23 1531   Wound Depth (cm) 0.3 cm 08/08/23 1531   Wound  Volume (cm^3) 0.6 cm^3 08/08/23 1531   Wound Surface Area (cm^2) 2 cm^2 08/08/23 1531   Care Cleansed with:;Antimicrobial agent 08/08/23 1531   Dressing Applied;Hydrofiber;Silver;Gauze;Absorptive Pad;Other (comment) 08/08/23 1531   Periwound Care Skin barrier film applied 08/08/23 1531            Altered Skin Integrity 01/12/23 1532 Right Ischial tuberosity Full thickness tissue loss with exposed bone, tendon, or muscle. Often includes undermining and tunneling. May extend into muscle and/or supporting structures. (Active)   01/12/23 1532   Altered Skin Integrity Present on Admission - Did Patient arrive to the hospital with altered skin?: yes   Side: Right   Orientation:    Location: Ischial tuberosity   Wound Number:    Is this injury device related?:    Primary Wound Type:    Description of Altered Skin Integrity: Full thickness tissue loss with exposed bone, tendon, or muscle. Often includes undermining and tunneling. May extend into muscle and/or supporting structures.   Ankle-Brachial Index:    Pulses:    Removal Indication and Assessment:    Wound Outcome:    (Retired) Wound Length (cm):    (Retired) Wound Width (cm):    (Retired) Depth (cm):    Wound Description (Comments):    Removal Indications:    Wound Image   08/08/23 1531   Dressing Appearance Intact;Moist drainage 08/08/23 1531   Drainage Amount Large 08/08/23 1531   Drainage Characteristics/Odor Serosanguineous;No odor;Bleeding controlled 08/08/23 1531   Appearance Red;Granulating;Gray;Tan;Yellow;Slough;Ecchymotic 08/08/23 1531   Tissue loss description Full thickness 08/08/23 1531   Red (%), Wound Tissue Color 75 % 08/08/23 1531   Periwound Area Intact 08/08/23 1531   Wound Edges Defined 08/08/23 1531   Wound Length (cm) 13 cm 08/08/23 1531   Wound Width (cm) 15.5 cm 08/08/23 1531   Wound Depth (cm) 1.6 cm 08/08/23 1531   Wound Volume (cm^3) 322.4 cm^3 08/08/23 1531   Wound Surface Area (cm^2) 201.5 cm^2 08/08/23 1531   Care Cleansed  with:;Antimicrobial agent 08/08/23 1531   Dressing Applied;Hydrofiber;Silver;Gauze;Absorptive Pad 08/08/23 1531   Periwound Care Skin barrier film applied 08/08/23 1531            Altered Skin Integrity Left Greater trochanter Full thickness tissue loss. Base is covered by slough and/or eschar in the wound bed (Active)       Altered Skin Integrity Present on Admission - Did Patient arrive to the hospital with altered skin?: yes   Side: Left   Orientation:    Location: Greater trochanter   Wound Number:    Is this injury device related?:    Primary Wound Type:    Description of Altered Skin Integrity: Full thickness tissue loss. Base is covered by slough and/or eschar in the wound bed   Ankle-Brachial Index:    Pulses:    Removal Indication and Assessment:    Wound Outcome:    (Retired) Wound Length (cm):    (Retired) Wound Width (cm):    (Retired) Depth (cm):    Wound Description (Comments):    Removal Indications:    Wound Image   08/08/23 1531   Description of Altered Skin Integrity Full thickness tissue loss with exposed bone, tendon, or muscle. Often includes undermining and tunneling. May extend into muscle and/or supporting structures. 08/08/23 1531   Dressing Appearance Intact;Moist drainage 08/08/23 1531   Drainage Amount Copious 08/08/23 1531   Drainage Characteristics/Odor Purulent 08/08/23 1531   Appearance Granulating;Tan;Gray;White;Slough;Fibrin;Muscle;Pink 08/08/23 1531   Tissue loss description Full thickness 08/08/23 1531   Red (%), Wound Tissue Color 70 % 08/08/23 1531   Yellow (%), Wound Tissue Color 30 % 08/08/23 1531   Periwound Area Denuded;Ecchymotic 08/08/23 1531   Wound Edges Defined 08/08/23 1531   Wound Length (cm) 4 cm 08/08/23 1531   Wound Width (cm) 2 cm 08/08/23 1531   Wound Depth (cm) 1.5 cm 08/08/23 1531   Wound Volume (cm^3) 12 cm^3 08/08/23 1531   Wound Surface Area (cm^2) 8 cm^2 08/08/23 1531   Undermining (depth (cm)/location) 6 to 12 o'clock / 4 cm at 9 o'clock 08/08/23 1531    Care Cleansed with:;Antimicrobial agent 08/08/23 1531   Dressing Applied;Gauze;Absorptive Pad 08/08/23 1531   Packing packed with;other (see comment) 08/08/23 1531   Periwound Care Skin barrier film applied 08/08/23 1531            Altered Skin Integrity 08/01/23 1534 Left posterior Ankle Full thickness tissue loss. Base is covered by slough and/or eschar in the wound bed (Active)   08/01/23 1534   Altered Skin Integrity Present on Admission - Did Patient arrive to the hospital with altered skin?: yes   Side: Left   Orientation: posterior   Location: Ankle   Wound Number:    Is this injury device related?:    Primary Wound Type:    Description of Altered Skin Integrity: Full thickness tissue loss. Base is covered by slough and/or eschar in the wound bed   Ankle-Brachial Index:    Pulses: 2 + palpable, strong doppler signal per md   Removal Indication and Assessment:    Wound Outcome:    (Retired) Wound Length (cm):    (Retired) Wound Width (cm):    (Retired) Depth (cm):    Wound Description (Comments):    Removal Indications:    Wound Image   08/08/23 1531   Description of Altered Skin Integrity Full thickness tissue loss. Base is covered by slough and/or eschar in the wound bed 08/08/23 1531   Dressing Appearance Intact;Dry;Clean 08/08/23 1531   Drainage Amount None 08/08/23 1531   Appearance Tan;Black;Eschar;Pink;Not granulating 08/08/23 1531   Tissue loss description Full thickness 08/08/23 1531   Yellow (%), Wound Tissue Color 95 % 08/08/23 1531   Periwound Area Dry 08/08/23 1531   Wound Edges Irregular 08/08/23 1531   Wound Length (cm) 8 cm 08/08/23 1531   Wound Width (cm) 5 cm 08/08/23 1531   Wound Depth (cm) 0.1 cm 08/08/23 1531   Wound Volume (cm^3) 4 cm^3 08/08/23 1531   Wound Surface Area (cm^2) 40 cm^2 08/08/23 1531   Care Cleansed with:;Sterile normal saline;Applied:;Povidone iodine 08/08/23 1531   Dressing Applied;Gauze;Absorptive Pad;Rolled gauze;Tubular bandage 08/08/23 1531            Altered Skin  Integrity 08/01/23 1535 Left anterior Ankle Other (comment) Full thickness tissue loss. Base is covered by slough and/or eschar in the wound bed (Active)   08/01/23 1535   Altered Skin Integrity Present on Admission - Did Patient arrive to the hospital with altered skin?: yes   Side: Left   Orientation: anterior   Location: Ankle   Wound Number:    Is this injury device related?: No   Primary Wound Type: Other   Description of Altered Skin Integrity: Full thickness tissue loss. Base is covered by slough and/or eschar in the wound bed   Ankle-Brachial Index:    Pulses: 2+ palpable and strong doppler pulses per MD   Removal Indication and Assessment:    Wound Outcome:    (Retired) Wound Length (cm):    (Retired) Wound Width (cm):    (Retired) Depth (cm):    Wound Description (Comments):    Removal Indications:    Wound Image   08/08/23 1531   Description of Altered Skin Integrity Full thickness tissue loss. Base is covered by slough and/or eschar in the wound bed 08/08/23 1531   Dressing Appearance Intact;Dry 08/08/23 1531   Drainage Amount None 08/08/23 1531   Appearance Black;Ryan;Dry;Eschar 08/08/23 1531   Black (%), Wound Tissue Color 100 % 08/08/23 1531   Periwound Area Dry 08/08/23 1531   Wound Edges Defined 08/08/23 1531   Wound Length (cm) 4 cm 08/08/23 1531   Wound Width (cm) 3.5 cm 08/08/23 1531   Wound Surface Area (cm^2) 14 cm^2 08/08/23 1531   Care Cleansed with:;Sterile normal saline;Applied:;Povidone iodine 08/08/23 1531   Dressing Applied;Gauze;Absorptive Pad;Rolled gauze;Tubular bandage 08/08/23 1531         Assessment:     Today patient has wounds of his left dorsal foot and posterior left heel that are eschar.  Because of patient being on anticoagulants we will not debride this at this time.  Betadine was applied to these areas to keep derm dry.  Last week they were somewhat moist.  Right heel is improving.  Right ischial tuberosity wound has some evidence of more bruising.  Wound also appears more  pale.  There is some more bruising.  Sacral wound looks good today and appears to be well granulated.  Left trochanter wound is pale and done today.  Patient also appears to be pale and is more week then regular.  He is also losing weight.  Vital signs abnormal except his temperature is 100.2 orally.  Patient states that he feels cold time and feels better outside in the heat.  We will continue with current therapy as below.  Because of his increased weakness and paleness we will order a CBC and CMP.  Patient will continue on treatments as below and we will rechecked the patient in 1 week.  Labwork should be VAC tomorrow and this will be passed on to his PCP and Cardiology.    ICD-10-CM ICD-9-CM   1. 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 Pressure injury of right ischium, stage 4  L89.314 707.05     707.24   3. Pressure injury of right heel, stage 3  L89.613 707.07     707.23   4. 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, stage 4, unspecified laterality  L89.44 707.09     707.24   5. Pressure injury of left ankle, unstageable  L89.520 707.06     707.25         Plan:   Tissue pathology and/or culture taken:  [] Yes [x] No   Sharp debridement performed:   [] Yes [x] No   Labs ordered this visit:   [x] Yes [] No   Imaging ordered this visit:   [] Yes [x] No           Orders Placed This Encounter   Procedures    Change dressing     Cleanse wounds with: NS or wound cleanser   Periwound care: Skin prep periwounds, allow to dry   Primary dressing: Apply Opticel Ag to R ischium, Sacrum, R heel ulceration   Left anterior ankle, L posterior ankle - Apply betadine to eschar, allow to dry -wrap with gauze, abd, kerlix daily for LLE   NO COMPRESSION to site     Apply Santyl, then Vashe moistened kerlix gauze 4x4(fluffed- only use 1 per day), to L trochanter including undermining     - Home health to make sure you have adequate sizes of supplies based on  wound size   Secondary dressing: Apply gauze, abd pads, medfix tape to trochanter, ischium and sacrum   Secondary dressings for both Left and R foot wounds -   - gauze, abd, kerlix.   Offloading: Offload as much as possible   Edema control: Elevation; Tubigrip F to RLE   Frequency: Change Sacrum., L trochanter and R ischium, L anterior and posterior ankle daily  Change R heel every 3 days   Follow-up: 1 week for md visit          Follow up in about 1 week (around 8/15/2023) for md visit .

## 2023-08-08 NOTE — PATIENT INSTRUCTIONS
Cleanse wounds with: NS or wound cleanser   Periwound care: Skin prep periwounds, allow to dry   Primary dressing: Apply Opticel Ag to R ischium, Sacrum, R heel ulceration   Left anterior ankle, L posterior ankle - Apply betadine to eschar, allow to dry -wrap with gauze, abd, kerlix daily for LLE   NO COMPRESSION to site     Apply Santyl, then Vashe moistened kerlix gauze 4x4(fluffed- only use 1 per day), to L trochanter including undermining     - Home health to make sure you have adequate sizes of supplies based on wound size   Secondary dressing: Apply gauze, abd pads, medfix tape to trochanter, ischium and sacrum   Secondary dressings for both Left and R foot wounds -   - gauze, abd, kerlix.   Offloading: Offload as much as possible   Edema control: Elevation; Tubigrip F to RLE   Frequency: Change Sacrum., L trochanter and R ischium, L anterior and posterior ankle daily  Change R heel every 3 days   Follow-up: 1 week for md visit      Go to outpatient services for blood work ordered today.

## 2023-08-09 ENCOUNTER — LAB VISIT (OUTPATIENT)
Dept: LAB | Facility: HOSPITAL | Age: 58
End: 2023-08-09
Attending: PEDIATRICS
Payer: MEDICARE

## 2023-08-09 DIAGNOSIS — D64.9 ANEMIA, UNSPECIFIED TYPE: ICD-10-CM

## 2023-08-09 DIAGNOSIS — S71.002D OPEN WOUND OF LEFT HIP, SUBSEQUENT ENCOUNTER: ICD-10-CM

## 2023-08-09 DIAGNOSIS — R53.1 WEAKNESS: ICD-10-CM

## 2023-08-09 LAB
ALBUMIN SERPL-MCNC: 2.4 G/DL (ref 3.5–5)
ALBUMIN/GLOB SERPL: 0.6 RATIO (ref 1.1–2)
ALP SERPL-CCNC: 88 UNIT/L (ref 40–150)
ALT SERPL-CCNC: 6 UNIT/L (ref 0–55)
AST SERPL-CCNC: 4 UNIT/L (ref 5–34)
BASOPHILS # BLD AUTO: 0.01 X10(3)/MCL
BASOPHILS NFR BLD AUTO: 0.1 %
BILIRUB SERPL-MCNC: 0.4 MG/DL
BUN SERPL-MCNC: 16.4 MG/DL (ref 8.4–25.7)
CALCIUM SERPL-MCNC: 8.5 MG/DL (ref 8.4–10.2)
CHLORIDE SERPL-SCNC: 106 MMOL/L (ref 98–107)
CO2 SERPL-SCNC: 24 MMOL/L (ref 22–29)
CREAT SERPL-MCNC: 0.81 MG/DL (ref 0.73–1.18)
EOSINOPHIL # BLD AUTO: 0.06 X10(3)/MCL (ref 0–0.9)
EOSINOPHIL NFR BLD AUTO: 0.6 %
ERYTHROCYTE [DISTWIDTH] IN BLOOD BY AUTOMATED COUNT: 16.3 % (ref 11.5–17)
GFR SERPLBLD CREATININE-BSD FMLA CKD-EPI: >60 MLS/MIN/1.73/M2
GLOBULIN SER-MCNC: 3.9 GM/DL (ref 2.4–3.5)
GLUCOSE SERPL-MCNC: 226 MG/DL (ref 74–100)
HCT VFR BLD AUTO: 28.9 % (ref 42–52)
HGB BLD-MCNC: 8.3 G/DL (ref 14–18)
IMM GRANULOCYTES # BLD AUTO: 0.03 X10(3)/MCL (ref 0–0.04)
IMM GRANULOCYTES NFR BLD AUTO: 0.3 %
LYMPHOCYTES # BLD AUTO: 0.91 X10(3)/MCL (ref 0.6–4.6)
LYMPHOCYTES NFR BLD AUTO: 8.7 %
MCH RBC QN AUTO: 24.4 PG (ref 27–31)
MCHC RBC AUTO-ENTMCNC: 28.7 G/DL (ref 33–36)
MCV RBC AUTO: 85 FL (ref 80–94)
MONOCYTES # BLD AUTO: 0.75 X10(3)/MCL (ref 0.1–1.3)
MONOCYTES NFR BLD AUTO: 7.2 %
NEUTROPHILS # BLD AUTO: 8.7 X10(3)/MCL (ref 2.1–9.2)
NEUTROPHILS NFR BLD AUTO: 83.1 %
PLATELET # BLD AUTO: 252 X10(3)/MCL (ref 130–400)
PMV BLD AUTO: 9.2 FL (ref 7.4–10.4)
POTASSIUM SERPL-SCNC: 4.3 MMOL/L (ref 3.5–5.1)
PROT SERPL-MCNC: 6.3 GM/DL (ref 6.4–8.3)
RBC # BLD AUTO: 3.4 X10(6)/MCL (ref 4.7–6.1)
SODIUM SERPL-SCNC: 139 MMOL/L (ref 136–145)
WBC # SPEC AUTO: 10.46 X10(3)/MCL (ref 4.5–11.5)

## 2023-08-09 PROCEDURE — 36415 COLL VENOUS BLD VENIPUNCTURE: CPT

## 2023-08-09 PROCEDURE — 80053 COMPREHEN METABOLIC PANEL: CPT

## 2023-08-09 PROCEDURE — 85025 COMPLETE CBC W/AUTO DIFF WBC: CPT

## 2023-08-14 ENCOUNTER — HOSPITAL ENCOUNTER (EMERGENCY)
Facility: HOSPITAL | Age: 58
Discharge: HOME | End: 2023-08-14
Attending: FAMILY MEDICINE
Payer: MEDICARE

## 2023-08-14 VITALS
RESPIRATION RATE: 18 BRPM | SYSTOLIC BLOOD PRESSURE: 113 MMHG | OXYGEN SATURATION: 96 % | TEMPERATURE: 99 F | BODY MASS INDEX: 36.37 KG/M2 | HEART RATE: 97 BPM | WEIGHT: 240 LBS | DIASTOLIC BLOOD PRESSURE: 82 MMHG | HEIGHT: 68 IN

## 2023-08-14 DIAGNOSIS — R55 SYNCOPE: Primary | ICD-10-CM

## 2023-08-14 DIAGNOSIS — N39.0 URINARY TRACT INFECTION WITHOUT HEMATURIA, SITE UNSPECIFIED: ICD-10-CM

## 2023-08-14 DIAGNOSIS — R40.20 LOSS OF CONSCIOUSNESS: ICD-10-CM

## 2023-08-14 LAB
ALBUMIN SERPL-MCNC: 2.2 G/DL (ref 3.5–5)
ALBUMIN/GLOB SERPL: 0.5 RATIO (ref 1.1–2)
ALP SERPL-CCNC: 136 UNIT/L (ref 40–150)
ALT SERPL-CCNC: 7 UNIT/L (ref 0–55)
APPEARANCE UR: ABNORMAL
AST SERPL-CCNC: 5 UNIT/L (ref 5–34)
BACTERIA #/AREA URNS AUTO: ABNORMAL /HPF
BASOPHILS # BLD AUTO: 0 X10(3)/MCL
BASOPHILS NFR BLD AUTO: 0 %
BILIRUB SERPL-MCNC: 0.3 MG/DL
BILIRUB UR QL STRIP.AUTO: NEGATIVE
BUN SERPL-MCNC: 23.6 MG/DL (ref 8.4–25.7)
CALCIUM SERPL-MCNC: 8.6 MG/DL (ref 8.4–10.2)
CHLORIDE SERPL-SCNC: 102 MMOL/L (ref 98–107)
CO2 SERPL-SCNC: 21 MMOL/L (ref 22–29)
COLOR UR: YELLOW
CREAT SERPL-MCNC: 0.95 MG/DL (ref 0.73–1.18)
EOSINOPHIL # BLD AUTO: 0.2 X10(3)/MCL (ref 0–0.9)
EOSINOPHIL NFR BLD AUTO: 2 %
ERYTHROCYTE [DISTWIDTH] IN BLOOD BY AUTOMATED COUNT: 16.1 % (ref 11.5–17)
GFR SERPLBLD CREATININE-BSD FMLA CKD-EPI: >60 MLS/MIN/1.73/M2
GLOBULIN SER-MCNC: 4.2 GM/DL (ref 2.4–3.5)
GLUCOSE SERPL-MCNC: 228 MG/DL (ref 74–100)
GLUCOSE UR QL STRIP.AUTO: NEGATIVE
HCT VFR BLD AUTO: 31.8 % (ref 42–52)
HGB BLD-MCNC: 9 G/DL (ref 14–18)
IMM GRANULOCYTES # BLD AUTO: 0.03 X10(3)/MCL (ref 0–0.04)
IMM GRANULOCYTES NFR BLD AUTO: 0.3 %
KETONES UR QL STRIP.AUTO: NEGATIVE
LACTATE SERPL-SCNC: 1.6 MMOL/L (ref 0.5–2.2)
LEUKOCYTE ESTERASE UR QL STRIP.AUTO: ABNORMAL
LYMPHOCYTES # BLD AUTO: 1 X10(3)/MCL (ref 0.6–4.6)
LYMPHOCYTES NFR BLD AUTO: 10 %
MCH RBC QN AUTO: 25.3 PG (ref 27–31)
MCHC RBC AUTO-ENTMCNC: 28.3 G/DL (ref 33–36)
MCV RBC AUTO: 89.3 FL (ref 80–94)
MONOCYTES # BLD AUTO: 0.56 X10(3)/MCL (ref 0.1–1.3)
MONOCYTES NFR BLD AUTO: 5.6 %
NEUTROPHILS # BLD AUTO: 8.25 X10(3)/MCL (ref 2.1–9.2)
NEUTROPHILS NFR BLD AUTO: 82.1 %
NITRITE UR QL STRIP.AUTO: POSITIVE
PH UR STRIP.AUTO: 6.5 [PH]
PLATELET # BLD AUTO: 298 X10(3)/MCL (ref 130–400)
PMV BLD AUTO: 10.6 FL (ref 7.4–10.4)
POTASSIUM SERPL-SCNC: 4.8 MMOL/L (ref 3.5–5.1)
PROT SERPL-MCNC: 6.4 GM/DL (ref 6.4–8.3)
PROT UR QL STRIP.AUTO: 100
RBC # BLD AUTO: 3.56 X10(6)/MCL (ref 4.7–6.1)
RBC #/AREA URNS AUTO: ABNORMAL /HPF
RBC UR QL AUTO: ABNORMAL
SODIUM SERPL-SCNC: 135 MMOL/L (ref 136–145)
SP GR UR STRIP.AUTO: 1.02
SQUAMOUS #/AREA URNS AUTO: ABNORMAL /HPF
UROBILINOGEN UR STRIP-ACNC: 0.2
WBC # SPEC AUTO: 10.04 X10(3)/MCL (ref 4.5–11.5)
WBC #/AREA URNS AUTO: ABNORMAL /HPF

## 2023-08-14 PROCEDURE — 96365 THER/PROPH/DIAG IV INF INIT: CPT

## 2023-08-14 PROCEDURE — 83605 ASSAY OF LACTIC ACID: CPT | Performed by: FAMILY MEDICINE

## 2023-08-14 PROCEDURE — 87088 URINE BACTERIA CULTURE: CPT | Performed by: FAMILY MEDICINE

## 2023-08-14 PROCEDURE — 81001 URINALYSIS AUTO W/SCOPE: CPT | Performed by: FAMILY MEDICINE

## 2023-08-14 PROCEDURE — 96361 HYDRATE IV INFUSION ADD-ON: CPT

## 2023-08-14 PROCEDURE — 99285 EMERGENCY DEPT VISIT HI MDM: CPT | Mod: 25

## 2023-08-14 PROCEDURE — 63600175 PHARM REV CODE 636 W HCPCS: Performed by: FAMILY MEDICINE

## 2023-08-14 PROCEDURE — 85025 COMPLETE CBC W/AUTO DIFF WBC: CPT | Performed by: FAMILY MEDICINE

## 2023-08-14 PROCEDURE — 87186 SC STD MICRODIL/AGAR DIL: CPT | Performed by: FAMILY MEDICINE

## 2023-08-14 PROCEDURE — 25000003 PHARM REV CODE 250: Performed by: FAMILY MEDICINE

## 2023-08-14 PROCEDURE — 80053 COMPREHEN METABOLIC PANEL: CPT | Performed by: FAMILY MEDICINE

## 2023-08-14 PROCEDURE — 93005 ELECTROCARDIOGRAM TRACING: CPT

## 2023-08-14 PROCEDURE — 87040 BLOOD CULTURE FOR BACTERIA: CPT | Performed by: FAMILY MEDICINE

## 2023-08-14 RX ORDER — CIPROFLOXACIN 500 MG/1
500 TABLET ORAL 2 TIMES DAILY
Qty: 20 TABLET | Refills: 0 | Status: ON HOLD | OUTPATIENT
Start: 2023-08-14 | End: 2023-08-22 | Stop reason: HOSPADM

## 2023-08-14 RX ORDER — CEFTRIAXONE 1 G/1
1 INJECTION, POWDER, FOR SOLUTION INTRAMUSCULAR; INTRAVENOUS
Status: DISCONTINUED | OUTPATIENT
Start: 2023-08-14 | End: 2023-08-14

## 2023-08-14 RX ADMIN — SODIUM CHLORIDE 500 ML: 9 INJECTION, SOLUTION INTRAVENOUS at 01:08

## 2023-08-14 RX ADMIN — CEFTRIAXONE SODIUM 1 G: 1 INJECTION, POWDER, FOR SOLUTION INTRAMUSCULAR; INTRAVENOUS at 02:08

## 2023-08-14 RX ADMIN — SODIUM CHLORIDE 500 ML: 9 INJECTION, SOLUTION INTRAVENOUS at 02:08

## 2023-08-14 NOTE — ED PROVIDER NOTES
Encounter Date: 8/14/2023       History     Chief Complaint   Patient presents with    Loss of Consciousness     Pt states he was brushing his teeth this am and passed out  for approx 3 min -states his blood pressure was low --denies pain     Syncope   57-year-old male patient comes in with syncopal episode while brushing his teeth patient extensive medical history which could possibly be contributing to this including previous cardiac diabetes mellitus quadriplegia and pressure also patient his significant other was his history source        Review of patient's allergies indicates:  No Known Allergies  Past Medical History:   Diagnosis Date    A-fib     Cardiac arrest     7/2022    Chronic skin ulcer     DM (diabetes mellitus)     Infected decubitus ulcer     Lymphedema of leg     Neurogenic bladder     Obesity, unspecified     Pressure ulcer of heel     Pressure ulcer of right foot, stage 3     Pressure ulcer of right ischium     Quadriplegia     Quadriplegic spinal paralysis      Past Surgical History:   Procedure Laterality Date    APPLICATION OF WOUND VACUUM-ASSISTED CLOSURE DEVICE Right 5/12/2023    Procedure: APPLICATION, WOUND VAC;  Surgeon: Keyonna Jean MD;  Location: [facility] OR;  Service: General;  Laterality: Right;    DEBRIDEMENT OF BUTTOCKS Right 5/12/2023    Procedure: DEBRIDEMENT, BUTTOCK;  Surgeon: Keyonna Jean MD;  Location: [facility] OR;  Service: General;  Laterality: Right;     No family history on file.  Social History     Tobacco Use    Smoking status: Never    Smokeless tobacco: Never   Substance Use Topics    Alcohol use: Never    Drug use: Never     Review of Systems   Constitutional:  Positive for appetite change. Negative for fever.   HENT:  Negative for sore throat.    Respiratory:  Negative for shortness of breath.    Cardiovascular:  Negative for chest pain.   Gastrointestinal:  Negative for nausea.   Genitourinary:  Negative for dysuria.   Musculoskeletal:  Negative for back  pain.   Skin:  Positive for wound. Negative for rash.   Neurological:  Positive for syncope and weakness.   Hematological:  Does not bruise/bleed easily.       Physical Exam     Initial Vitals [08/14/23 1236]   BP Pulse Resp Temp SpO2   90/63 89 20 98.6 °F (37 °C) 98 %      MAP       --         Physical Exam    Nursing note and vitals reviewed.  Constitutional: He appears well-developed and well-nourished. He is not diaphoretic. No distress.   HENT:   Head: Normocephalic and atraumatic.   Right Ear: External ear normal.   Left Ear: External ear normal.   Nose: Nose normal.   Mouth/Throat: Oropharynx is clear and moist. No oropharyngeal exudate.   Eyes: Conjunctivae and EOM are normal. Pupils are equal, round, and reactive to light. Right eye exhibits no discharge. Left eye exhibits no discharge.   Neck: Neck supple. No thyromegaly present. No tracheal deviation present. No JVD present.   Normal range of motion.  Cardiovascular:  Normal rate, regular rhythm, normal heart sounds and intact distal pulses.     Exam reveals no gallop and no friction rub.       No murmur heard.  Pulmonary/Chest: Breath sounds normal. No stridor. No respiratory distress. He has no wheezes. He has no rhonchi. He has no rales. He exhibits no tenderness.   Abdominal: Abdomen is soft. Bowel sounds are normal. He exhibits no distension. There is no abdominal tenderness. There is no rebound and no guarding.   Musculoskeletal:         General: No tenderness or edema.      Cervical back: Normal range of motion and neck supple.      Comments: Patient is a quadriplegic     Lymphadenopathy:     He has no cervical adenopathy.   Neurological: He is alert and oriented to person, place, and time. He has normal strength and normal reflexes. No cranial nerve deficit or sensory deficit. GCS score is 15. GCS eye subscore is 4. GCS verbal subscore is 5. GCS motor subscore is 6.   Skin: Skin is warm and dry. Rash noted. No abscess noted. There is erythema.    Psychiatric: He has a normal mood and affect. His behavior is normal. Judgment and thought content normal.         ED Course   Procedures  Labs Reviewed   COMPREHENSIVE METABOLIC PANEL - Abnormal; Notable for the following components:       Result Value    Sodium Level 135 (*)     Carbon Dioxide 21 (*)     Glucose Level 228 (*)     Albumin Level 2.2 (*)     Globulin 4.2 (*)     Albumin/Globulin Ratio 0.5 (*)     All other components within normal limits   URINALYSIS, REFLEX TO URINE CULTURE - Abnormal; Notable for the following components:    Appearance, UA Slightly Cloudy (*)     Protein,  (*)     Blood, UA Large (*)     Nitrites, UA Positive (*)     Leukocyte Esterase, UA Small (*)     All other components within normal limits   CBC WITH DIFFERENTIAL - Abnormal; Notable for the following components:    RBC 3.56 (*)     Hgb 9.0 (*)     Hct 31.8 (*)     MCH 25.3 (*)     MCHC 28.3 (*)     MPV 10.6 (*)     All other components within normal limits   URINALYSIS, MICROSCOPIC - Abnormal; Notable for the following components:    Bacteria, UA Moderate (*)     RBC, UA 11-20 (*)     WBC, UA 11-20 (*)     All other components within normal limits   LACTIC ACID, PLASMA - Normal   BLOOD CULTURE [facility]   CULTURE, URINE   CBC W/ AUTO DIFFERENTIAL    Narrative:     The following orders were created for panel order CBC auto differential.  Procedure                               Abnormality         Status                     ---------                               -----------         ------                     CBC with Differential[[id number]]        Abnormal            Final result                 Please view results for these tests on the individual orders.          Imaging Results              X-Ray Chest AP Portable (Final result)  Result time 08/14/23 13:46:48      Final result by Andrade Perez MD (08/14/23 13:46:48)                   Impression:      NO ACUTE CARDIOPULMONARY PROCESS IDENTIFIED.      Electronically  signed by: Andrade Perez  Date:    08/14/2023  Time:    13:46               Narrative:    EXAMINATION:  XR CHEST AP PORTABLE    CLINICAL HISTORY:  Chest Pain;    TECHNIQUE:  One view    COMPARISON:  May 22, 2023.    FINDINGS:  Cardiopericardial silhouette is within normal limits. Lungs are without dense focal or segmental consolidation, congestive process, pleural effusions or pneumothorax.  Demineralization of bones.  Degenerative changes involve shoulder joints.                                       Medications   sodium chloride 0.9% bolus 500 mL 500 mL (0 mLs Intravenous Stopped 8/14/23 1432)   sodium chloride 0.9% bolus 500 mL 500 mL (500 mLs Intravenous New Bag 8/14/23 1420)   cefTRIAXone (ROCEPHIN) 1 g in dextrose 5 % in water (D5W) 100 mL IVPB (MB+) (0 g Intravenous Stopped 8/14/23 1451)     Medical Decision Making:   Differential Diagnosis:   Dehydration heat exhaustion sepsis                          Clinical Impression:   Final diagnoses:  [R55] Syncope (Primary)  [R40.20] Loss of consciousness  [N39.0] Urinary tract infection without hematuria, site unspecified        ED Disposition Condition    Discharge Stable          ED Prescriptions       Medication Sig Dispense Start Date End Date Auth. Provider    ciprofloxacin HCl (CIPRO) 500 MG tablet Take 1 tablet (500 mg total) by mouth 2 (two) times daily. for 10 days 20 tablet 8/14/2023 8/24/2023 Gerson Reyes MD          Follow-up Information       Follow up With Specialties Details Why Contact Info    Jennifer Fernando NP Family Medicine   [address] [place] [id number]  [phone]               Gerson Reyes MD  08/14/23 1884

## 2023-08-15 ENCOUNTER — HOSPITAL ENCOUNTER (OUTPATIENT)
Dept: WOUND CARE | Facility: HOSPITAL | Age: 58
Discharge: HOME | End: 2023-08-15
Attending: NURSE PRACTITIONER
Payer: MEDICARE

## 2023-08-15 VITALS
HEART RATE: 86 BPM | DIASTOLIC BLOOD PRESSURE: 58 MMHG | OXYGEN SATURATION: 97 % | SYSTOLIC BLOOD PRESSURE: 86 MMHG | TEMPERATURE: 99 F | RESPIRATION RATE: 18 BRPM

## 2023-08-15 DIAGNOSIS — L89.44 PRESSURE INJURY OF CONTIGUOUS REGION INVOLVING BACK AND BUTTOCK, STAGE 4, UNSPECIFIED LATERALITY: ICD-10-CM

## 2023-08-15 DIAGNOSIS — L89.314 PRESSURE INJURY OF RIGHT ISCHIUM, STAGE 4: Primary | ICD-10-CM

## 2023-08-15 DIAGNOSIS — L89.224 PRESSURE INJURY OF TROCHANTERIC REGION OF LEFT HIP, STAGE 4: ICD-10-CM

## 2023-08-15 DIAGNOSIS — L89.613 PRESSURE INJURY OF RIGHT HEEL, STAGE 3: ICD-10-CM

## 2023-08-15 DIAGNOSIS — L89.520 PRESSURE INJURY OF LEFT ANKLE, UNSTAGEABLE: ICD-10-CM

## 2023-08-15 DIAGNOSIS — M86.10 ACUTE OSTEOMYELITIS: ICD-10-CM

## 2023-08-15 PROCEDURE — 99213 OFFICE O/P EST LOW 20 MIN: CPT | Mod: ,,, | Performed by: PEDIATRICS

## 2023-08-15 PROCEDURE — 99213 PR OFFICE/OUTPT VISIT, EST, LEVL III, 20-29 MIN: ICD-10-PCS | Mod: ,,, | Performed by: PEDIATRICS

## 2023-08-15 PROCEDURE — 99215 OFFICE O/P EST HI 40 MIN: CPT

## 2023-08-15 PROCEDURE — 27000999 HC MEDICAL RECORD PHOTO DOCUMENTATION

## 2023-08-15 NOTE — PATIENT INSTRUCTIONS
Cleanse wounds with: NS or wound cleanser   Periwound care: Skin prep periwounds, allow to dry   Primary dressing: Apply Opticel Ag  Sacrum, R heel ulceration   Left anterior ankle, L posterior ankle - Apply betadine to eschar, allow to dry -wrap with gauze, abd, kerlix daily for LLE   NO COMPRESSION to site     Apply Santyl,mesalt, to R ischium, L trochanter      - Home health to make sure you have adequate sizes of supplies based on wound size   Secondary dressing: Apply gauze, abd pads, medfix tape to L trochanter,  R ischium and sacrum   Secondary dressings for both Left and R foot wounds -   - gauze, abd, kerlix.   Offloading: Offload as much as possible   Edema control: Elevation; Tubigrip F to RLE   Frequency: Change Sacrum., L trochanter and R ischium, L anterior and posterior ankle daily  Change R heel every 3 days   Follow-up: 1 week for md visit        no back pain/no chills/no nausea/no vomiting

## 2023-08-15 NOTE — PROGRESS NOTES
Subjective:       Patient ID: Antonio Galvan II is a 57 y.o. male.    Chief Complaint: Pressure Ulcer (Pressure Ulcer (Multiple pressure ulcers, R ischium , sacrum, R heel and L trochanter, L anterior and posterior ankle.  Follow up with md for re-evaluation and treatment )    HPI  Review of Systems      Objective:      Temp:  [98.2 °F (36.8 °C)-98.7 °F (37.1 °C)]   Pulse:  [66-86]   Resp:  [18-20]   BP: (66-86)/(39-58)   SpO2:  [97 %]   Physical Exam       Altered Skin Integrity 05/06/22 1140 Right Heel  Full thickness tissue loss. Subcutaneous fat may be visible but bone, tendon or muscle are not exposed (Active)   05/06/22 1140   Altered Skin Integrity Present on Admission - Did Patient arrive to the hospital with altered skin?: yes   Side: Right   Orientation:    Location: Heel   Wound Number:    Is this injury device related?: No   Primary Wound Type:    Description of Altered Skin Integrity: Full thickness tissue loss. Subcutaneous fat may be visible but bone, tendon or muscle are not exposed   Ankle-Brachial Index:    Pulses:    Removal Indication and Assessment:    Wound Outcome:    (Retired) Wound Length (cm):    (Retired) Wound Width (cm):    (Retired) Depth (cm):    Wound Description (Comments):    Removal Indications:    Wound Image   08/15/23 1450   Dressing Appearance Intact;Dried drainage 08/15/23 1450   Drainage Amount Moderate 08/15/23 1450   Drainage Characteristics/Odor Serosanguineous;No odor;Bleeding controlled 08/15/23 1450   Appearance Pink;Red;Granulating 08/15/23 1450   Tissue loss description Full thickness 08/15/23 1450   Red (%), Wound Tissue Color 100 % 08/15/23 1450   Periwound Area Scar tissue;Ecchymotic 08/15/23 1450   Wound Edges Callused 08/15/23 1450   Wound Length (cm) 3 cm 08/15/23 1450   Wound Width (cm) 0.7 cm 08/15/23 1450   Wound Depth (cm) 0.1 cm 08/15/23 1450   Wound Volume (cm^3) 0.21 cm^3 08/15/23 1450   Wound Surface Area (cm^2) 2.1 cm^2 08/15/23 1450   Care  Cleansed with:;Antimicrobial agent 08/15/23 1450   Dressing Applied;Hydrofiber;Silver;Gauze;Absorptive Pad;Rolled gauze;Tubular bandage 08/15/23 1450   Periwound Care Skin barrier film applied 08/15/23 1450   Compression Tubular elasticized bandage 08/15/23 1450            Altered Skin Integrity 01/12/23 1530 Sacral spine Full thickness tissue loss with exposed bone, tendon, or muscle. Often includes undermining and tunneling. May extend into muscle and/or supporting structures. (Active)   01/12/23 1530   Altered Skin Integrity Present on Admission - Did Patient arrive to the hospital with altered skin?: yes   Side:    Orientation:    Location: Sacral spine   Wound Number:    Is this injury device related?:    Primary Wound Type:    Description of Altered Skin Integrity: Full thickness tissue loss with exposed bone, tendon, or muscle. Often includes undermining and tunneling. May extend into muscle and/or supporting structures.   Ankle-Brachial Index:    Pulses:    Removal Indication and Assessment:    Wound Outcome:    (Retired) Wound Length (cm):    (Retired) Wound Width (cm):    (Retired) Depth (cm):    Wound Description (Comments):    Removal Indications:    Wound Image   08/15/23 1450   Description of Altered Skin Integrity Full thickness tissue loss. Subcutaneous fat may be visible but bone, tendon or muscle are not exposed 08/15/23 1450   Drainage Amount Moderate 08/15/23 1450   Drainage Characteristics/Odor Serosanguineous 08/15/23 1450   Appearance Red;Not granulating 08/15/23 1450   Tissue loss description Full thickness 08/15/23 1450   Red (%), Wound Tissue Color 100 % 08/15/23 1450   Periwound Area Scar tissue 08/15/23 1450   Wound Edges Defined 08/15/23 1450   Wound Length (cm) 4.5 cm 08/15/23 1450   Wound Width (cm) 0.7 cm 08/15/23 1450   Wound Depth (cm) 1.1 cm 08/15/23 1450   Wound Volume (cm^3) 3.465 cm^3 08/15/23 1450   Wound Surface Area (cm^2) 3.15 cm^2 08/15/23 1450   Care Cleansed  with:;Antimicrobial agent 08/15/23 1450   Dressing Applied;Hydrofiber;Silver;Gauze;Absorptive Pad 08/15/23 1450   Periwound Care Skin barrier film applied 08/15/23 1450            Altered Skin Integrity 01/12/23 1532 Right Ischial tuberosity Full thickness tissue loss with exposed bone, tendon, or muscle. Often includes undermining and tunneling. May extend into muscle and/or supporting structures. (Active)   01/12/23 1532   Altered Skin Integrity Present on Admission - Did Patient arrive to the hospital with altered skin?: yes   Side: Right   Orientation:    Location: Ischial tuberosity   Wound Number:    Is this injury device related?:    Primary Wound Type:    Description of Altered Skin Integrity: Full thickness tissue loss with exposed bone, tendon, or muscle. Often includes undermining and tunneling. May extend into muscle and/or supporting structures.   Ankle-Brachial Index:    Pulses:    Removal Indication and Assessment:    Wound Outcome:    (Retired) Wound Length (cm):    (Retired) Wound Width (cm):    (Retired) Depth (cm):    Wound Description (Comments):    Removal Indications:    Wound Image   08/15/23 1450   Dressing Appearance Intact;Moist drainage 08/15/23 1450   Drainage Amount Large 08/15/23 1450   Drainage Characteristics/Odor Serosanguineous 08/15/23 1450   Appearance Red;Not granulating;Purple;Maroon;Black;Gray;Tan;Necrotic;Slough;Eschar 08/15/23 1450   Tissue loss description Full thickness 08/15/23 1450   Black (%), Wound Tissue Color 30 % 08/15/23 1450   Red (%), Wound Tissue Color 40 % 08/15/23 1450   Yellow (%), Wound Tissue Color 30 % 08/15/23 1450   Periwound Area Ecchymotic;Denuded;Macerated;Redness 08/15/23 1450   Wound Edges Irregular;Jagged 08/15/23 1450   Wound Length (cm) 15 cm 08/15/23 1450   Wound Width (cm) 15 cm 08/15/23 1450   Wound Depth (cm) 3.4 cm 08/15/23 1450   Wound Volume (cm^3) 765 cm^3 08/15/23 1450   Wound Surface Area (cm^2) 225 cm^2 08/15/23 1450   Care Cleansed  with:;Antimicrobial agent 08/15/23 1450   Dressing Applied;Sodium chloride impregnated;Gauze;Absorptive Pad;Other (comment) 08/15/23 1450   Periwound Care Skin barrier film applied 08/15/23 1450            Altered Skin Integrity Left Greater trochanter Full thickness tissue loss. Base is covered by slough and/or eschar in the wound bed (Active)       Altered Skin Integrity Present on Admission - Did Patient arrive to the hospital with altered skin?: yes   Side: Left   Orientation:    Location: Greater trochanter   Wound Number:    Is this injury device related?:    Primary Wound Type:    Description of Altered Skin Integrity: Full thickness tissue loss. Base is covered by slough and/or eschar in the wound bed   Ankle-Brachial Index:    Pulses:    Removal Indication and Assessment:    Wound Outcome:    (Retired) Wound Length (cm):    (Retired) Wound Width (cm):    (Retired) Depth (cm):    Wound Description (Comments):    Removal Indications:    Wound Image   08/15/23 1450   Dressing Appearance Intact;Saturated 08/15/23 1450   Drainage Amount Copious 08/15/23 1450   Drainage Characteristics/Odor Brown;Creamy;Malodorous;Bleeding controlled 08/15/23 1450   Appearance Pink;Not granulating;Black;Gray;Tan;Necrotic;Slough;Bone 08/15/23 1450   Tissue loss description Full thickness 08/15/23 1450   Black (%), Wound Tissue Color 25 % 08/15/23 1450   Red (%), Wound Tissue Color 30 % 08/15/23 1450   Yellow (%), Wound Tissue Color 45 % 08/15/23 1450   Periwound Area Redness 08/15/23 1450   Wound Edges Defined 08/15/23 1450   Wound Length (cm) 3.8 cm 08/15/23 1450   Wound Width (cm) 2.5 cm 08/15/23 1450   Wound Depth (cm) 2 cm 08/15/23 1450   Wound Volume (cm^3) 19 cm^3 08/15/23 1450   Wound Surface Area (cm^2) 9.5 cm^2 08/15/23 1450   Undermining (depth (cm)/location) 6 to 12 o'clock / 4cm at 12 o'clock 08/15/23 1450   Care Cleansed with:;Antimicrobial agent 08/15/23 1450   Dressing Changed;Sodium chloride  impregnated;Gauze;Absorptive Pad;Other (comment) 08/15/23 1450   Packing packed with;other (see comment) 08/15/23 1450   Periwound Care Skin barrier film applied 08/15/23 1450            Altered Skin Integrity 08/01/23 1534 Left posterior Ankle Full thickness tissue loss. Base is covered by slough and/or eschar in the wound bed (Active)   08/01/23 1534   Altered Skin Integrity Present on Admission - Did Patient arrive to the hospital with altered skin?: yes   Side: Left   Orientation: posterior   Location: Ankle   Wound Number:    Is this injury device related?:    Primary Wound Type:    Description of Altered Skin Integrity: Full thickness tissue loss. Base is covered by slough and/or eschar in the wound bed   Ankle-Brachial Index:    Pulses: 2 + palpable, strong doppler signal per md   Removal Indication and Assessment:    Wound Outcome:    (Retired) Wound Length (cm):    (Retired) Wound Width (cm):    (Retired) Depth (cm):    Wound Description (Comments):    Removal Indications:    Wound Image   08/15/23 1450   Description of Altered Skin Integrity Full thickness tissue loss. Base is covered by slough and/or eschar in the wound bed 08/15/23 1450   Dressing Appearance Intact;Dry;Clean 08/15/23 1450   Drainage Amount None 08/15/23 1450   Appearance Black;Dry;Eschar 08/15/23 1450   Tissue loss description Full thickness 08/15/23 1450   Black (%), Wound Tissue Color 100 % 08/15/23 1450   Periwound Area Dry 08/15/23 1450   Wound Length (cm) 7.5 cm 08/15/23 1450   Wound Width (cm) 3.7 cm 08/15/23 1450   Wound Surface Area (cm^2) 27.75 cm^2 08/15/23 1450   Care Cleansed with:;Antimicrobial agent;Applied:;Povidone iodine 08/15/23 1450   Dressing Applied;Gauze;Absorptive Pad;Rolled gauze 08/15/23 1450            Altered Skin Integrity 08/01/23 1535 Left anterior Ankle Other (comment) Full thickness tissue loss. Base is covered by slough and/or eschar in the wound bed (Active)   08/01/23 1535   Altered Skin Integrity  Present on Admission - Did Patient arrive to the hospital with altered skin?: yes   Side: Left   Orientation: anterior   Location: Ankle   Wound Number:    Is this injury device related?: No   Primary Wound Type: Other   Description of Altered Skin Integrity: Full thickness tissue loss. Base is covered by slough and/or eschar in the wound bed   Ankle-Brachial Index:    Pulses: 2+ palpable and strong doppler pulses per MD   Removal Indication and Assessment:    Wound Outcome:    (Retired) Wound Length (cm):    (Retired) Wound Width (cm):    (Retired) Depth (cm):    Wound Description (Comments):    Removal Indications:    Wound Image   08/15/23 1450   Description of Altered Skin Integrity Full thickness tissue loss. Base is covered by slough and/or eschar in the wound bed 08/15/23 1450   Dressing Appearance Intact;Dry;Clean 08/15/23 1450   Drainage Amount None 08/15/23 1450   Appearance Black;Dry;Eschar 08/15/23 1450   Black (%), Wound Tissue Color 100 % 08/15/23 1450   Periwound Area Dry 08/15/23 1450   Wound Edges Defined 08/15/23 1450   Wound Length (cm) 3.7 cm 08/15/23 1450   Wound Width (cm) 3.3 cm 08/15/23 1450   Wound Surface Area (cm^2) 12.21 cm^2 08/15/23 1450   Care Cleansed with:;Antimicrobial agent;Applied:;Povidone iodine 08/15/23 1450   Dressing Applied;Gauze;Absorptive Pad;Rolled gauze 08/15/23 1450         Assessment:     Here today for evaluation of wounds.  Patient was in the emergency room last night with loss of consciousness and urinary tract infection and dehydration.  Patient was started on Rocephin and then Ciprofloxacin.  Patient today with an initial blood pressure of 86/58.  Patient's wounds were examined with stabilized wounds of both feet.  Right ischium with necrotic dry tissue.  Sacral area larger than last week.  Left trochanter with wound down to the bone with necrosis of the bone.  All wounds were dressed with Santyl and Mesalt.  Spoke with Dr. Jean he suggested that patient go  to the wound care at [facility] tomorrow morning and he will see the patient with the tent of admitting him to the hospital and debridement of the bone.  Before patient left he had near syncopal episode and was confused.  Blood pressure was 55/33.  Because of this patient was taken to the emergency room..    ICD-10-CM ICD-9-CM   1. Pressure injury of right ischium, stage 4  L89.314 707.05     707.24   2. 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, stage 4, unspecified laterality  L89.44 707.09     707.24   3. Pressure injury of trochanteric region of left hip, stage 4  L89.224 707.04     707.24   4. Pressure injury of right heel, stage 3  L89.613 707.07     707.23   5. Pressure injury of left ankle, unstageable  L89.520 707.06     707.25   6. Acute osteomyelitis  M86.10 730.00         Plan:   Tissue pathology and/or culture taken:  [] Yes [x] No   Sharp debridement performed:   [] Yes [x] No   Labs ordered this visit:   [] Yes [x] No   Imaging ordered this visit:   [] Yes [x] No           Orders Placed This Encounter   Procedures    Change dressing     Cleanse wounds with: NS or wound cleanser   Periwound care: Skin prep periwounds, allow to dry   Primary dressing: Apply Opticel Ag  Sacrum, R heel ulceration   Left anterior ankle, L posterior ankle - Apply betadine to eschar, allow to dry -wrap with gauze, abd, kerlix daily for LLE   NO COMPRESSION to site     Apply Santyl,mesalt, to R ischium, L trochanter      - Home health to make sure you have adequate sizes of supplies based on wound size   Secondary dressing: Apply gauze, abd pads, medfix tape to L trochanter,  R ischium and sacrum   Secondary dressings for both Left and R foot wounds -   - gauze, abd, kerlix.   Offloading: Offload as much as possible   Edema control: Elevation; Tubigrip F to RLE   Frequency: Change Sacrum., L trochanter and R ischium, L anterior and posterior ankle daily  Change R heel every 3 days   Follow-up: 1 week for  md visit             Follow up in about 1 week (around 8/22/2023) for md visit .

## 2023-08-16 ENCOUNTER — ANESTHESIA (OUTPATIENT)
Dept: SURGERY | Facility: HOSPITAL | Age: 58
DRG: 981 | End: 2023-08-16
Payer: MEDICARE

## 2023-08-16 ENCOUNTER — ANESTHESIA EVENT (OUTPATIENT)
Dept: SURGERY | Facility: HOSPITAL | Age: 58
DRG: 981 | End: 2023-08-16
Payer: MEDICARE

## 2023-08-16 ENCOUNTER — HOSPITAL ENCOUNTER (INPATIENT)
Facility: HOSPITAL | Age: 58
LOS: 6 days | Discharge: NURSING, REHAB OR LONG-TERM CARE | DRG: 981 | End: 2023-08-23
Attending: EMERGENCY MEDICINE | Admitting: EMERGENCY MEDICINE
Payer: MEDICARE

## 2023-08-16 DIAGNOSIS — I95.9 HYPOTENSION, UNSPECIFIED HYPOTENSION TYPE: ICD-10-CM

## 2023-08-16 DIAGNOSIS — M86.9 OSTEOMYELITIS, UNSPECIFIED SITE, UNSPECIFIED TYPE: ICD-10-CM

## 2023-08-16 DIAGNOSIS — R55 SYNCOPE: ICD-10-CM

## 2023-08-16 DIAGNOSIS — A41.9 SEVERE SEPSIS: ICD-10-CM

## 2023-08-16 DIAGNOSIS — R65.20 SEVERE SEPSIS: ICD-10-CM

## 2023-08-16 DIAGNOSIS — L89.90 PRESSURE INJURY OF SKIN, UNSPECIFIED INJURY STAGE, UNSPECIFIED LOCATION: ICD-10-CM

## 2023-08-16 DIAGNOSIS — M25.552 PAIN OF LEFT HIP: ICD-10-CM

## 2023-08-16 DIAGNOSIS — D64.9 SYMPTOMATIC ANEMIA: Primary | ICD-10-CM

## 2023-08-16 DIAGNOSIS — M25.551 PAIN OF RIGHT HIP: ICD-10-CM

## 2023-08-16 LAB
ABO + RH BLD: NORMAL
ANION GAP SERPL CALC-SCNC: 9 MEQ/L
APPEARANCE UR: CLEAR
BACTERIA #/AREA URNS AUTO: ABNORMAL /HPF
BASOPHILS # BLD AUTO: 0.04 X10(3)/MCL
BASOPHILS NFR BLD AUTO: 0.6 %
BILIRUB UR QL STRIP.AUTO: NEGATIVE
BLD PROD TYP BPU: NORMAL
BLOOD UNIT EXPIRATION DATE: NORMAL
BLOOD UNIT TYPE CODE: 7300
BUN SERPL-MCNC: 23 MG/DL (ref 8.4–25.7)
CALCIUM SERPL-MCNC: 8.2 MG/DL (ref 8.4–10.2)
CHLORIDE SERPL-SCNC: 105 MMOL/L (ref 98–107)
CO2 SERPL-SCNC: 23 MMOL/L (ref 22–29)
COLOR UR: YELLOW
CREAT SERPL-MCNC: 0.8 MG/DL (ref 0.73–1.18)
CREAT/UREA NIT SERPL: 29
CROSSMATCH INTERPRETATION: NORMAL
D DIMER PPP IA.FEU-MCNC: 1.74 UG/ML FEU (ref 0–0.5)
DISPENSE STATUS: NORMAL
EOSINOPHIL # BLD AUTO: 0.18 X10(3)/MCL (ref 0–0.9)
EOSINOPHIL NFR BLD AUTO: 2.7 %
ERYTHROCYTE [DISTWIDTH] IN BLOOD BY AUTOMATED COUNT: 16.3 % (ref 11.5–17)
GFR SERPLBLD CREATININE-BSD FMLA CKD-EPI: >60 MLS/MIN/1.73/M2
GLUCOSE SERPL-MCNC: 228 MG/DL (ref 74–100)
GLUCOSE UR QL STRIP.AUTO: NEGATIVE
GROUP & RH: NORMAL
HCT VFR BLD AUTO: 23.8 % (ref 42–52)
HGB BLD-MCNC: 7 G/DL (ref 14–18)
IMM GRANULOCYTES # BLD AUTO: 0.02 X10(3)/MCL (ref 0–0.04)
IMM GRANULOCYTES NFR BLD AUTO: 0.3 %
INDIRECT COOMBS GEL: NORMAL
KETONES UR QL STRIP.AUTO: NEGATIVE
LEUKOCYTE ESTERASE UR QL STRIP.AUTO: ABNORMAL
LYMPHOCYTES # BLD AUTO: 1.17 X10(3)/MCL (ref 0.6–4.6)
LYMPHOCYTES NFR BLD AUTO: 17.4 %
MAGNESIUM SERPL-MCNC: 1.8 MG/DL (ref 1.6–2.6)
MCH RBC QN AUTO: 24.8 PG (ref 27–31)
MCHC RBC AUTO-ENTMCNC: 29.4 G/DL (ref 33–36)
MCV RBC AUTO: 84.4 FL (ref 80–94)
MONOCYTES # BLD AUTO: 0.44 X10(3)/MCL (ref 0.1–1.3)
MONOCYTES NFR BLD AUTO: 6.6 %
NEUTROPHILS # BLD AUTO: 4.86 X10(3)/MCL (ref 2.1–9.2)
NEUTROPHILS NFR BLD AUTO: 72.4 %
NITRITE UR QL STRIP.AUTO: NEGATIVE
PH UR STRIP.AUTO: 6 [PH]
PLATELET # BLD AUTO: 346 X10(3)/MCL (ref 130–400)
PMV BLD AUTO: 10 FL (ref 7.4–10.4)
POCT GLUCOSE: 105 MG/DL (ref 70–110)
POCT GLUCOSE: 119 MG/DL (ref 70–110)
POCT GLUCOSE: 242 MG/DL (ref 70–110)
POTASSIUM SERPL-SCNC: 4.4 MMOL/L (ref 3.5–5.1)
PROT UR QL STRIP.AUTO: ABNORMAL
RBC # BLD AUTO: 2.82 X10(6)/MCL (ref 4.7–6.1)
RBC #/AREA URNS AUTO: ABNORMAL /HPF
RBC UR QL AUTO: ABNORMAL
SODIUM SERPL-SCNC: 137 MMOL/L (ref 136–145)
SP GR UR STRIP.AUTO: 1.01
SPECIMEN OUTDATE: NORMAL
SQUAMOUS #/AREA URNS AUTO: ABNORMAL /HPF
UNIT NUMBER: NORMAL
URATE CRY URNS QL MICRO: ABNORMAL /HPF
UROBILINOGEN UR STRIP-ACNC: 0.2
WBC # SPEC AUTO: 6.71 X10(3)/MCL (ref 4.5–11.5)
WBC #/AREA URNS AUTO: ABNORMAL /HPF

## 2023-08-16 PROCEDURE — 87070 CULTURE OTHR SPECIMN AEROBIC: CPT | Performed by: SURGERY

## 2023-08-16 PROCEDURE — 63600175 PHARM REV CODE 636 W HCPCS: Performed by: NURSE ANESTHETIST, CERTIFIED REGISTERED

## 2023-08-16 PROCEDURE — G0378 HOSPITAL OBSERVATION PER HR: HCPCS

## 2023-08-16 PROCEDURE — 96372 THER/PROPH/DIAG INJ SC/IM: CPT | Performed by: INTERNAL MEDICINE

## 2023-08-16 PROCEDURE — 85025 COMPLETE CBC W/AUTO DIFF WBC: CPT | Performed by: EMERGENCY MEDICINE

## 2023-08-16 PROCEDURE — 85379 FIBRIN DEGRADATION QUANT: CPT | Performed by: EMERGENCY MEDICINE

## 2023-08-16 PROCEDURE — 25500020 PHARM REV CODE 255: Performed by: EMERGENCY MEDICINE

## 2023-08-16 PROCEDURE — 99900035 HC TECH TIME PER 15 MIN (STAT)

## 2023-08-16 PROCEDURE — 37000008 HC ANESTHESIA 1ST 15 MINUTES: Performed by: SURGERY

## 2023-08-16 PROCEDURE — 25000003 PHARM REV CODE 250: Performed by: INTERNAL MEDICINE

## 2023-08-16 PROCEDURE — 86920 COMPATIBILITY TEST SPIN: CPT | Performed by: EMERGENCY MEDICINE

## 2023-08-16 PROCEDURE — 25000003 PHARM REV CODE 250: Performed by: EMERGENCY MEDICINE

## 2023-08-16 PROCEDURE — 94761 N-INVAS EAR/PLS OXIMETRY MLT: CPT

## 2023-08-16 PROCEDURE — 63600175 PHARM REV CODE 636 W HCPCS: Performed by: EMERGENCY MEDICINE

## 2023-08-16 PROCEDURE — 87102 FUNGUS ISOLATION CULTURE: CPT | Performed by: SURGERY

## 2023-08-16 PROCEDURE — 80048 BASIC METABOLIC PNL TOTAL CA: CPT | Performed by: EMERGENCY MEDICINE

## 2023-08-16 PROCEDURE — P9016 RBC LEUKOCYTES REDUCED: HCPCS | Performed by: EMERGENCY MEDICINE

## 2023-08-16 PROCEDURE — 63600175 PHARM REV CODE 636 W HCPCS: Performed by: INTERNAL MEDICINE

## 2023-08-16 PROCEDURE — D9220A PRA ANESTHESIA: Mod: ,,, | Performed by: NURSE ANESTHETIST, CERTIFIED REGISTERED

## 2023-08-16 PROCEDURE — 83735 ASSAY OF MAGNESIUM: CPT | Performed by: EMERGENCY MEDICINE

## 2023-08-16 PROCEDURE — 36430 TRANSFUSION BLD/BLD COMPNT: CPT

## 2023-08-16 PROCEDURE — 87070 CULTURE OTHR SPECIMN AEROBIC: CPT | Performed by: NURSE PRACTITIONER

## 2023-08-16 PROCEDURE — 36000705 HC OR TIME LEV I EA ADD 15 MIN: Performed by: SURGERY

## 2023-08-16 PROCEDURE — 99285 EMERGENCY DEPT VISIT HI MDM: CPT | Mod: 25

## 2023-08-16 PROCEDURE — 93005 ELECTROCARDIOGRAM TRACING: CPT

## 2023-08-16 PROCEDURE — D9220A PRA ANESTHESIA: ICD-10-PCS | Mod: ,,, | Performed by: NURSE ANESTHETIST, CERTIFIED REGISTERED

## 2023-08-16 PROCEDURE — 87205 SMEAR GRAM STAIN: CPT | Performed by: SURGERY

## 2023-08-16 PROCEDURE — 37000009 HC ANESTHESIA EA ADD 15 MINS: Performed by: SURGERY

## 2023-08-16 PROCEDURE — 88305 TISSUE EXAM BY PATHOLOGIST: CPT | Performed by: SURGERY

## 2023-08-16 PROCEDURE — 87070 CULTURE OTHR SPECIMN AEROBIC: CPT | Performed by: INTERNAL MEDICINE

## 2023-08-16 PROCEDURE — 87075 CULTR BACTERIA EXCEPT BLOOD: CPT | Performed by: SURGERY

## 2023-08-16 PROCEDURE — 25000003 PHARM REV CODE 250: Performed by: NURSE ANESTHETIST, CERTIFIED REGISTERED

## 2023-08-16 PROCEDURE — 81001 URINALYSIS AUTO W/SCOPE: CPT | Performed by: EMERGENCY MEDICINE

## 2023-08-16 PROCEDURE — 86900 BLOOD TYPING SEROLOGIC ABO: CPT | Performed by: EMERGENCY MEDICINE

## 2023-08-16 PROCEDURE — 36000704 HC OR TIME LEV I 1ST 15 MIN: Performed by: SURGERY

## 2023-08-16 RX ORDER — CARVEDILOL 12.5 MG/1
12.5 TABLET ORAL DAILY
Status: DISCONTINUED | OUTPATIENT
Start: 2023-08-16 | End: 2023-08-16

## 2023-08-16 RX ORDER — AMOXICILLIN 250 MG
2 CAPSULE ORAL 2 TIMES DAILY
Status: DISCONTINUED | OUTPATIENT
Start: 2023-08-16 | End: 2023-08-23 | Stop reason: HOSPADM

## 2023-08-16 RX ORDER — CARVEDILOL 25 MG/1
25 TABLET ORAL DAILY
Status: DISCONTINUED | OUTPATIENT
Start: 2023-08-16 | End: 2023-08-17

## 2023-08-16 RX ORDER — TALC
6 POWDER (GRAM) TOPICAL NIGHTLY PRN
Status: DISCONTINUED | OUTPATIENT
Start: 2023-08-16 | End: 2023-08-23 | Stop reason: HOSPADM

## 2023-08-16 RX ORDER — CARVEDILOL 25 MG/1
25 TABLET ORAL DAILY
Status: DISCONTINUED | OUTPATIENT
Start: 2023-08-16 | End: 2023-08-16

## 2023-08-16 RX ORDER — GLUCAGON 1 MG
1 KIT INJECTION
Status: DISCONTINUED | OUTPATIENT
Start: 2023-08-16 | End: 2023-08-23 | Stop reason: HOSPADM

## 2023-08-16 RX ORDER — CARVEDILOL 25 MG/1
TABLET ORAL
Status: DISPENSED
Start: 2023-08-16 | End: 2023-08-17

## 2023-08-16 RX ORDER — IBUPROFEN 200 MG
24 TABLET ORAL
Status: DISCONTINUED | OUTPATIENT
Start: 2023-08-16 | End: 2023-08-23 | Stop reason: HOSPADM

## 2023-08-16 RX ORDER — FENTANYL CITRATE 50 UG/ML
25 INJECTION, SOLUTION INTRAMUSCULAR; INTRAVENOUS EVERY 5 MIN PRN
Status: CANCELLED | OUTPATIENT
Start: 2023-08-16

## 2023-08-16 RX ORDER — ENOXAPARIN SODIUM 100 MG/ML
40 INJECTION SUBCUTANEOUS EVERY 12 HOURS
Status: DISCONTINUED | OUTPATIENT
Start: 2023-08-16 | End: 2023-08-23 | Stop reason: HOSPADM

## 2023-08-16 RX ORDER — LANOLIN ALCOHOL/MO/W.PET/CERES
1 CREAM (GRAM) TOPICAL DAILY
Status: DISCONTINUED | OUTPATIENT
Start: 2023-08-16 | End: 2023-08-23 | Stop reason: HOSPADM

## 2023-08-16 RX ORDER — ESCITALOPRAM OXALATE 5 MG/1
5 TABLET ORAL DAILY
Status: DISCONTINUED | OUTPATIENT
Start: 2023-08-16 | End: 2023-08-23 | Stop reason: HOSPADM

## 2023-08-16 RX ORDER — INSULIN ASPART 100 [IU]/ML
1-10 INJECTION, SOLUTION INTRAVENOUS; SUBCUTANEOUS
Status: DISCONTINUED | OUTPATIENT
Start: 2023-08-16 | End: 2023-08-23 | Stop reason: HOSPADM

## 2023-08-16 RX ORDER — SODIUM CHLORIDE, SODIUM LACTATE, POTASSIUM CHLORIDE, CALCIUM CHLORIDE 600; 310; 30; 20 MG/100ML; MG/100ML; MG/100ML; MG/100ML
INJECTION, SOLUTION INTRAVENOUS CONTINUOUS
Status: DISCONTINUED | OUTPATIENT
Start: 2023-08-16 | End: 2023-08-16

## 2023-08-16 RX ORDER — SODIUM CHLORIDE 0.9 % (FLUSH) 0.9 %
10 SYRINGE (ML) INJECTION
Status: CANCELLED | OUTPATIENT
Start: 2023-08-16

## 2023-08-16 RX ORDER — TRAMADOL HYDROCHLORIDE 50 MG/1
50 TABLET ORAL EVERY 6 HOURS PRN
Status: DISCONTINUED | OUTPATIENT
Start: 2023-08-16 | End: 2023-08-23 | Stop reason: HOSPADM

## 2023-08-16 RX ORDER — SODIUM CHLORIDE, SODIUM LACTATE, POTASSIUM CHLORIDE, CALCIUM CHLORIDE 600; 310; 30; 20 MG/100ML; MG/100ML; MG/100ML; MG/100ML
INJECTION, SOLUTION INTRAVENOUS CONTINUOUS
Status: CANCELLED | OUTPATIENT
Start: 2023-08-16

## 2023-08-16 RX ORDER — HYDROCODONE BITARTRATE AND ACETAMINOPHEN 500; 5 MG/1; MG/1
TABLET ORAL
Status: DISCONTINUED | OUTPATIENT
Start: 2023-08-16 | End: 2023-08-23 | Stop reason: HOSPADM

## 2023-08-16 RX ORDER — BUDESONIDE 0.5 MG/2ML
1 INHALANT ORAL DAILY
Status: DISCONTINUED | OUTPATIENT
Start: 2023-08-16 | End: 2023-08-23 | Stop reason: HOSPADM

## 2023-08-16 RX ORDER — SODIUM CHLORIDE 0.9 % (FLUSH) 0.9 %
10 SYRINGE (ML) INJECTION
Status: DISCONTINUED | OUTPATIENT
Start: 2023-08-16 | End: 2023-08-23 | Stop reason: HOSPADM

## 2023-08-16 RX ORDER — PROPOFOL 10 MG/ML
VIAL (ML) INTRAVENOUS
Status: DISCONTINUED | OUTPATIENT
Start: 2023-08-16 | End: 2023-08-16

## 2023-08-16 RX ORDER — HYDROMORPHONE HYDROCHLORIDE 2 MG/ML
0.2 INJECTION, SOLUTION INTRAMUSCULAR; INTRAVENOUS; SUBCUTANEOUS EVERY 5 MIN PRN
Status: CANCELLED | OUTPATIENT
Start: 2023-08-16

## 2023-08-16 RX ORDER — MUPIROCIN 20 MG/G
OINTMENT TOPICAL 2 TIMES DAILY
Status: DISPENSED | OUTPATIENT
Start: 2023-08-16 | End: 2023-08-21

## 2023-08-16 RX ORDER — ONDANSETRON 4 MG/1
4 TABLET, ORALLY DISINTEGRATING ORAL EVERY 6 HOURS PRN
Status: DISCONTINUED | OUTPATIENT
Start: 2023-08-16 | End: 2023-08-23 | Stop reason: HOSPADM

## 2023-08-16 RX ORDER — LIDOCAINE HYDROCHLORIDE 20 MG/ML
INJECTION, SOLUTION EPIDURAL; INFILTRATION; INTRACAUDAL; PERINEURAL
Status: DISCONTINUED | OUTPATIENT
Start: 2023-08-16 | End: 2023-08-16

## 2023-08-16 RX ORDER — IBUPROFEN 200 MG
16 TABLET ORAL
Status: DISCONTINUED | OUTPATIENT
Start: 2023-08-16 | End: 2023-08-23 | Stop reason: HOSPADM

## 2023-08-16 RX ORDER — HYDROCODONE BITARTRATE AND ACETAMINOPHEN 10; 325 MG/1; MG/1
1 TABLET ORAL EVERY 6 HOURS PRN
Status: DISCONTINUED | OUTPATIENT
Start: 2023-08-16 | End: 2023-08-23 | Stop reason: HOSPADM

## 2023-08-16 RX ORDER — NIFEDIPINE 30 MG/1
60 TABLET, EXTENDED RELEASE ORAL DAILY
Status: DISCONTINUED | OUTPATIENT
Start: 2023-08-16 | End: 2023-08-16

## 2023-08-16 RX ORDER — FENTANYL CITRATE 50 UG/ML
INJECTION, SOLUTION INTRAMUSCULAR; INTRAVENOUS
Status: DISCONTINUED | OUTPATIENT
Start: 2023-08-16 | End: 2023-08-16

## 2023-08-16 RX ADMIN — IOPAMIDOL 100 ML: 755 INJECTION, SOLUTION INTRAVENOUS at 02:08

## 2023-08-16 RX ADMIN — PROPOFOL 50 MG: 10 INJECTION, EMULSION INTRAVENOUS at 08:08

## 2023-08-16 RX ADMIN — FENTANYL CITRATE 50 MCG: 50 INJECTION, SOLUTION INTRAMUSCULAR; INTRAVENOUS at 07:08

## 2023-08-16 RX ADMIN — SODIUM CHLORIDE, POTASSIUM CHLORIDE, SODIUM LACTATE AND CALCIUM CHLORIDE: 600; 310; 30; 20 INJECTION, SOLUTION INTRAVENOUS at 01:08

## 2023-08-16 RX ADMIN — SODIUM CHLORIDE, POTASSIUM CHLORIDE, SODIUM LACTATE AND CALCIUM CHLORIDE: 600; 310; 30; 20 INJECTION, SOLUTION INTRAVENOUS at 09:08

## 2023-08-16 RX ADMIN — MUPIROCIN 1 G: 20 OINTMENT TOPICAL at 09:08

## 2023-08-16 RX ADMIN — SENNOSIDES AND DOCUSATE SODIUM 2 TABLET: 50; 8.6 TABLET ORAL at 11:08

## 2023-08-16 RX ADMIN — CARVEDILOL 25 MG: 25 TABLET, FILM COATED ORAL at 11:08

## 2023-08-16 RX ADMIN — INSULIN ASPART 4 UNITS: 100 INJECTION, SOLUTION INTRAVENOUS; SUBCUTANEOUS at 11:08

## 2023-08-16 RX ADMIN — LIDOCAINE HYDROCHLORIDE 40 MG: 20 INJECTION, SOLUTION EPIDURAL; INFILTRATION; INTRACAUDAL; PERINEURAL at 07:08

## 2023-08-16 RX ADMIN — FERROUS SULFATE TAB 325 MG (65 MG ELEMENTAL FE) 1 EACH: 325 (65 FE) TAB at 11:08

## 2023-08-16 RX ADMIN — SODIUM CHLORIDE, POTASSIUM CHLORIDE, SODIUM LACTATE AND CALCIUM CHLORIDE: 600; 310; 30; 20 INJECTION, SOLUTION INTRAVENOUS at 07:08

## 2023-08-16 RX ADMIN — FENTANYL CITRATE 50 MCG: 50 INJECTION, SOLUTION INTRAMUSCULAR; INTRAVENOUS at 08:08

## 2023-08-16 RX ADMIN — ESCITALOPRAM 5 MG: 5 TABLET, FILM COATED ORAL at 11:08

## 2023-08-16 RX ADMIN — Medication 6 MG: at 09:08

## 2023-08-16 RX ADMIN — PROPOFOL 50 MG: 10 INJECTION, EMULSION INTRAVENOUS at 07:08

## 2023-08-16 RX ADMIN — TRAMADOL HYDROCHLORIDE 50 MG: 50 TABLET, FILM COATED ORAL at 09:08

## 2023-08-16 RX ADMIN — SITAGLIPTIN 100 MG: 100 TABLET, FILM COATED ORAL at 11:08
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
## 2023-08-16 NOTE — ADDENDUM NOTE
Encounter addended by: Nguyen Pacheco RN on: 8/16/2023 2:05 PM   Actions taken: Flowsheet accepted, Care Plan modified, Actions taken from a BestPractice Advisory

## 2023-08-16 NOTE — CONSULTS
Inpatient Nutrition Assessment    Admit Date: 8/16/2023   Total duration of encounter: 1 day     Nutrition Recommendation/Prescription     Rec'd Diabetic Diet with double protein when able to lift NPO status.   Add Cole, BID, to assist with healing.   Add 500 mg Vitamin C + 200 mg Zinc Sulfate daily to aid in wound healing.   Add Boost Glucose control TID (Chocolate or Strawberry - Rotate Flavors). Provides 190 kcal, 16 g protein per serving.   If protein intake is not sufficient, will add [employer] NoCarb Liquid protein - Pt agrees.  Monitor NPO status, weight, and labs.     RD following and available as needed. Thank you.     Communication of Recommendations: reviewed with nurse, reviewed with patient, reviewed with caregiver, reviewed with family, and EMR.     Nutrition Assessment     Malnutrition Assessment/Nutrition-Focused Physical Exam    Malnutrition in the context of acute illness or injury  Degree of Malnutrition: non-severe (moderate) malnutrition  Energy Intake: does not meet criteria  Interpretation of Weight Loss: does not meet criteria  Body Fat:mild depletion  Area of Body Fat Loss: orbital region   Muscle Mass Loss: mild depletion  Area of Muscle Mass Loss: temple region - temporalis muscle, clavicle bone region - pectoralis major, deltoid, trapezius muscles, and clavicle and acromion bone region - deltoid muscle  Fluid Accumulation: unable to obtain  Edema: unable to obtain   Reduced  Strength: unable to obtain  A minimum of two characteristics is recommended for diagnosis of either severe or non-severe malnutrition.    Chart Review    Reason Seen: physician consult for  optimize nutritional status    Malnutrition Screening Tool Results   Have you recently lost weight without trying?: Yes: Unsure how much  Have you been eating poorly because of a decreased appetite?: Yes   MST Score: 3     Diagnosis:  Left anterior foot eschar unstageable ulcer POA  Left posterior heel eschar unstageable ulcer  POA  Large right posterior hip gluteal ulcer stage IV POA  Stage II sacral decubitus ulcer POA  Left lateral hip stage IV ulcer POA  Multiple sites of infected wound  Orthostatic hypotension verses iatrogenic hypotension versus infected wound  Normocytic anemia/anemia of chronic disease         Pressure injury of right ischium, stage 4  L89.314 707.05       707.24   2. 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, stage 4, unspecified laterality  L89.44 707.09       707.24   3. Pressure injury of trochanteric region of left hip, stage 4  L89.224 707.04       707.24   4. Pressure injury of right heel, stage 3  L89.613 707.07       707.23   5. Pressure injury of left ankle, unstageable  L89.520 707.06       707.25   6. Acute osteomyelitis          Relevant Medical History:   A-fib      Cardiac arrest  7/2022    Chronic skin ulcer      DM (diabetes mellitus)      Infected decubitus ulcer      Lymphedema of leg      Neurogenic bladder      Obesity, unspecified      Pressure ulcer of heel      Pressure ulcer of right foot, stage 3      Pressure ulcer of right ischium      Quadriplegia      Quadriplegic spinal paralysis          Nutrition-Related Medications:   Sitagliptin phosphate; Senna-docusate; Ferrous Sulfate; Lovenox; Insulin.      Calorie Containing IV Medications: no significant kcals from medications at this time    Nutrition-Related Labs:  8/16: H/H 7.0/23.8(L); (H); Ca+ 8.2(L)    Diet/PN Order: Diet diabetic  Oral Supplement Order: none  Tube Feeding Order: none  Appetite/Oral Intake: NPO/NPO  Factors Affecting Nutritional Intake: NPO  Food/Rastafarian/Cultural Preferences:  Likes Strawberry and Chocolate Flavors.   Food Allergies: none reported    Skin Integrity: wound  Wound(s):      Altered Skin Integrity 01/12/23 1532 Right posterior Thigh Full thickness tissue loss with exposed bone, tendon, or muscle. Often includes undermining and tunneling. May extend into muscle and/or  "supporting structures.-Tissue loss description: Full thickness     Comments    : Pt states his appetite is good; however, reports weight loss. States UBW was about 270# 3 to 6 months ago. Family at bedside confirm weight loss. Noted multiple wounds. Goes to wound care. Pt has hx of anemia. States he is Diabetic but does not follow a Diabetic Diet at home. Last HgbA1C (6.9) on . Emphasized the importance of not skipping meals, following a Diabetic Diet, as well as, consuming adequate protein, Vitamin C, Zinc, and Vitamin A to assist with wound healing. Pt agreed to Cole and Boost Glucose Control - Would like Strawberry or Chocolate - Rotate flavors. States he drinks protein shakes at home. Pt states he is hungry and has not eaten since ~ 8:00 last night. Discussed recommendations with nsg and nsg reports pt is to remain NPO for surgery evaluation. Will continue to monitor during stay.     Anthropometrics    Height: 5' 7.99" (172.7 cm) Height Method: Stated  Last Weight: 106.4 kg (234 lb 9.6 oz) (23) Weight Method: Bed Scale  BMI (Calculated): 35.7  BMI Classification: obese grade II (BMI 35-39.9)        Ideal Body Weight (IBW), Male: 153.94 lb     % Ideal Body Weight, Male (lb): 152.34 %                 Usual Body Weight (UBW), k kg  % Usual Body Weight: 86.7     Usual Weight Provided By: patient    Wt Readings from Last 5 Encounters:   23 106.4 kg (234 lb 9.6 oz)   08/15/23 113.4 kg (250 lb)   23 108.9 kg (240 lb)   23 100.7 kg (222 lb 0.1 oz)   23 100.7 kg (222 lb 0.1 oz)     Weight Change(s) Since Admission:  Admit Weight: 106.4 kg (234 lb 9.6 oz) (23)  #    Estimated Needs    Weight Used For Calorie Calculations: 106 kg (233 lb 11 oz)  Energy Calorie Requirements (kcal): 2100 to 2200 kcal/day (20 to 22kcals/kg/CBW)  Energy Need Method: Kcal/kg  Weight Used For Protein Calculations: 70 kg (154 lb 5.2 oz) (IBW used to estimate protein " needs.)  Protein Requirements: 105 to 140 g/day (1.5 to 2.0 g/kg/IBW)  Fluid Requirements (mL): 2200 mL/day (1mL/kcal)  Temp (24hrs), Av.2 °F (36.8 °C), Min:97.6 °F (36.4 °C), Max:98.8 °F (37.1 °C)       Enteral Nutrition    Patient not receiving enteral nutrition at this time.    Parenteral Nutrition    Patient not receiving parenteral nutrition support at this time.    Evaluation of Received Nutrient Intake    Calories: not meeting estimated needs  Protein: not meeting estimated needs    Patient Education    Not applicable.    Nutrition Diagnosis     PES: Increased nutrient needs related to increased protein energy demand for wound healing as evidenced by Pt with multiple wounds. (new)    Interventions/Goals     Intervention(s): modified composition of meals/snacks, commercial beverage, multivitamin/mineral supplement therapy, and collaboration with other providers  Goal: Meet greater than 75% of nutritional needs by follow-up. (new)    Monitoring & Evaluation     Dietitian will monitor energy intake, weight, weight change, electrolyte/renal panel, glucose/endocrine profile, and gastrointestinal profile.  Nutrition Risk/Follow-Up: high (follow-up in 1-4 days)   Please consult if re-assessment needed sooner.

## 2023-08-16 NOTE — ED PROVIDER NOTES
ED PROVIDER NOTE  8/15/2023    CHIEF COMPLAINT:   Chief Complaint   Patient presents with    transfer     Pt sent here from [facility] for surgery. Pt has a wound on left greater trochanter. Pt was at wound care and had a syncopal episode. Pt seen at [facility] and diagnosed with osteomyelitis. Sent here for medicine/surgery       HISTORY OF PRESENT ILLNESS:   Antonio Galvan II is a 57 y.o. male who presents with chief complaint Recurrent syncope.  Onset was 2 days ago when he states that he had sat up in his chair to brush his teeth and had a syncopal episode.  He reports that yesterday he had just got done getting wound care on his decubitus wounds and whenever they sat him up in the bed to transfer him he had another syncopal episode.  He states that he was not experienced any chest pain or shortness of breath or hemoptysis.  He does have history of DVT in the upper extremity which he states was due to a PICC line.  He denies having any black or bloody stools.  He reports that he did have bleeding associated with his wounds especially when he had a wound VAC a couple of months ago.  He was received blood transfusions in the past.  He was seen at outside ED and was found to be hypotensive.  He did not have an elevated white blood cell count but his inflammatory markers were significantly elevated, and he was transferred to our facility for surgical evaluation due to having suspected underlying osteomyelitis associated with these chronic decubitus wounds.  Denies fever, chills, nausea, vomiting, diarrhea, shortness of breath, chest pain, cough, headache.    The history is provided by the patient and the spouse.         REVIEW OF SYSTEMS: as noted in the HPI.  NURSING NOTES REVIEWED      PAST MEDICAL/SURGICAL HISTORY:   Past Medical History:   Diagnosis Date    A-fib     Cardiac arrest     7/2022    Chronic skin ulcer     DM (diabetes mellitus)     Infected decubitus ulcer     Lymphedema of leg     Neurogenic  bladder     Obesity, unspecified     Pressure ulcer of heel     Pressure ulcer of right foot, stage 3     Pressure ulcer of right ischium     Quadriplegia     Quadriplegic spinal paralysis       Past Surgical History:   Procedure Laterality Date    APPLICATION OF WOUND VACUUM-ASSISTED CLOSURE DEVICE Right 5/12/2023    Procedure: APPLICATION, WOUND VAC;  Surgeon: Keyonna Jean MD;  Location: [facility] OR;  Service: General;  Laterality: Right;    DEBRIDEMENT OF BUTTOCKS Right 5/12/2023    Procedure: DEBRIDEMENT, BUTTOCK;  Surgeon: Keyonna Jean MD;  Location: [facility] OR;  Service: General;  Laterality: Right;       FAMILY HISTORY: History reviewed. No pertinent family history.    SOCIAL HISTORY:   Social History     Tobacco Use    Smoking status: Never    Smokeless tobacco: Never   Substance Use Topics    Alcohol use: Never    Drug use: Never       ALLERGIES: Review of patient's allergies indicates:  No Known Allergies    PHYSICAL EXAM:  Initial Vitals [08/16/23 0045]   BP Pulse Resp Temp SpO2   134/73 (!) 121 18 98.6 °F (37 °C) 98 %      MAP       --         Physical Exam    Nursing note and vitals reviewed.  Constitutional: He appears well-developed and well-nourished. No distress.   HENT:   Head: Normocephalic and atraumatic.   Nose: Nose normal.   Mouth/Throat: Oropharynx is clear and moist and mucous membranes are normal.   Eyes: Conjunctivae and EOM are normal. Pupils are equal, round, and reactive to light.   Neck: Neck supple. No tracheal deviation present.   Cardiovascular:  Normal rate, normal heart sounds, intact distal pulses and normal pulses. An irregular rhythm present.           Pulmonary/Chest: Effort normal and breath sounds normal. No respiratory distress.   Abdominal: Abdomen is soft. There is no abdominal tenderness. There is no rebound and no guarding.   Genitourinary:    Genitourinary Comments: Suprapubic catheter.     Musculoskeletal:      Cervical back: Neck supple.      Comments:  Quadriplegic     Neurological: He is alert and oriented to person, place, and time. He displays atrophy. He exhibits abnormal muscle tone. GCS eye subscore is 4. GCS verbal subscore is 5. GCS motor subscore is 6.   Quadriplegic.   Skin: Skin is warm and dry. There is pallor.   Large Stage III decubitus to right posterior thigh. Unstageable decubitus to left lateral thigh with purulent foul smelling drainage. Wound to dorsum of left foot with eschar, no surrounding erythema or induration. Dressing to decubitus wound to heel of right foot is clean, dry, intact.    Psychiatric: He has a normal mood and affect. His speech is normal and behavior is normal. Judgment and thought content normal. Cognition and memory are normal.         RESULTS:  Labs Reviewed   BASIC METABOLIC PANEL - Abnormal; Notable for the following components:       Result Value    Glucose Level 228 (*)     Calcium Level Total 8.2 (*)     All other components within normal limits   URINALYSIS, REFLEX TO URINE CULTURE - Abnormal; Notable for the following components:    Protein, UA 2+ (*)     Blood, UA 3+ (*)     Leukocyte Esterase, UA Trace (*)     All other components within normal limits   CBC WITH DIFFERENTIAL - Abnormal; Notable for the following components:    RBC 2.82 (*)     Hgb 7.0 (*)     Hct 23.8 (*)     MCH 24.8 (*)     MCHC 29.4 (*)     All other components within normal limits   D DIMER, QUANTITATIVE - Abnormal; Notable for the following components:    D-Dimer 1.74 (*)     All other components within normal limits   URINALYSIS, MICROSCOPIC - Abnormal; Notable for the following components:    Uric Acid Crystals, UA Moderate (*)     RBC, UA 6-10 (*)     All other components within normal limits   MAGNESIUM - Normal   CBC W/ AUTO DIFFERENTIAL    Narrative:     The following orders were created for panel order CBC auto differential.  Procedure                               Abnormality         Status                     ---------                                -----------         ------                     CBC with Differential[[id number]]        Abnormal            Final result                 Please view results for these tests on the individual orders.   TYPE & SCREEN   PREPARE RBC SOFT     Imaging Results              CT Abdomen Pelvis With Contrast (Preliminary result)  Result time 08/16/23 03:08:33      Preliminary result by Tang Fowler Jr., MD (08/16/23 03:08:33)                   Narrative:    START OF REPORT:  TECHNIQUE: CT OF THE ABDOMEN WAS PERFORMED WITH AXIAL IMAGES AS WELL AS SAGITTAL AND CORONAL RECONSTRUCTION IMAGES WITH INTRAVENOUS CONTRAST BUT WITHOUT ORAL CONTRAST.    COMPARISON: NONE AVAILABLE.    CLINICAL HISTORY: ABD PAIN.    DOSAGE INFORMATION: AUTOMATED EXPOSURE CONTROL WAS UTILIZED.    Findings:  Lines and Tubes: None.  Thorax:  Lungs: Moderate linear opacity is present at the visualized lung bases, consistent with scarring and atelectasis. No focal infiltrate or consolidation is seen.  Pleura: No effusions are seen. No pneumothorax is seen in the visualized lung bases. There is bilateral basal pleural thickening with extra pleural fat proliferation.  Heart: Mild cardiomegaly is seen.  Abdomen:  Abdominal Wall: No abdominal wall pathology is seen.  Liver: The liver appears unremarkable.  Biliary System: No intrahepatic or extrahepatic biliary duct dilatation is seen.  Gallbladder: A few small gallstones are seen in the gallbladder which otherwise appears unremarkable.  Pancreas: Moderate pancreatic atrophy is seen.  Spleen: The spleen appears mildly prominent. A cleft along the upper border is likely congenital.  Adrenals: There is a hypodense nodule measuring 12 mm in the right adrenal gland seen on series 5 image 49. This may reflect an adrenal adenoma.  Kidneys: The left kidney appears unremarkable with no stones cysts masses or hydronephrosis. A single cyst measuring 1.6 cm is seen on Image 54, Series 7 in the mid pole  of the right kidney. The right kidney otherwise appears unremarkable with no stones masses or hydronephrosis identified.  Aorta: The abdominal aorta appears unremarkable.  IVC: Unremarkable.  Bowel:  Esophagus: The visualized distal esophagus appears unremarkable.  Stomach: The stomach appears unremarkable.  Duodenum: Unremarkable appearing duodenum.  Small Bowel: The visualized small bowel appears unremarkable.  Colon: There is moderate stool in the colon which could reflect an element of constipation.  Appendix: The appendix appears unremarkable to the visualized extent and is partially seen on Image 30, Series 7.  Peritoneum: No intraperitoneal free air or ascites is seen in the visualized abdomen.    Bony structures:  Dorsal Spine: There is moderate multilevel spondylosis of the visualized dorsal spine. Note is made of calcification of the annulus fibrosis, which may reflect ankylosing spondylitis. Correlate clinically as regards additional evaluation and follow up.      Impression:  1. There is moderate stool in the colon which could reflect an element of constipation.  2. No acute intraabdominal or pelvic solid organ or bowel pathology identified. Limited study. Details and other findings as discussed above.                          Wet Read by Ranjeet Farias DO (08/16/23 02:18:32, [facility] - Emergency Dept, Emergency Medicine)    No bowel obstruction.  Imaging does not unfortunately extend into the area of interest.                                     CTA Chest Non-Coronary (PE Studies) (Preliminary result)  Result time 08/16/23 02:09:33      Preliminary result by Tang Fowler Jr., MD (08/16/23 02:09:33)                   Narrative:    START OF REPORT:  TECHNIQUE: CT SCAN OF THE CHEST WAS PERFORMED WITH INTRAVENOUS CONTRAST WITH DIRECT AXIAL IMAGES AS WELL AS SAGITTAL AND CORONAL RECONSTRUCTION IMAGES PULMONARY EMBOLUS PROTOCOL.    DOSAGE INFORMATION: AUTOMATED EXPOSURE CONTROL WAS  UTILIZED.    COMPARISON: NONE.    CLINICAL HISTORY: SOB.    Findings:  Mediastinum: A few calcified mediastinal lymph nodes are seen.  Heart: Mild cardiomegaly is seen.  Aorta: Unremarkable appearing aorta.  Pulmonary Arteries: No filling defects are seen in the pulmonary arteries to suggest pulmonary embolus.  Lungs: Moderate streaky linear opacity is seen predominantly in the dependent portions at the lung bases nonspecific dependent changes and or subsegmental atelectasis. No acute focal infiltrate or consolidation is seen. A few nodules are seen in the right lower lung.  Pleura: No effusions or pneumothorax are identified.  Bony Structures: The bones are osteopenic.  Spine: There is ankylosis of the thoracic spine. There is vertebral fracture along the superior endplate of T5 vertebra (series 7 image 74), which is probably old judging by coritical remodelling/sclerosis. The posterior elements look intact.  Abdomen: A few tiny stones are seen in the gallbladder.      Impression:  1. No filling defects are seen in the pulmonary arteries to suggest pulmonary embolus.  2. No acute focal infiltrate or consolidation is seen.  3. There is vertebral fracture along the superior endplate of T5 vertebra (series 7 image 74), which is probably old judging by coritical remodelling/sclerosis. The posterior elements look intact. Correlate clinically as regards further evaluation and follow-up.  4. Details and other findings as discussed above.                                        PROCEDURES:  Procedures    ECG:  EKG Readings: (Independently Interpreted)   Initial Reading: No STEMI. Rhythm: Atrial Flutter. Heart Rate: 84. Axis: Normal.       ED COURSE AND MEDICAL DECISION MAKING:  Medications   lactated ringers infusion ( Intravenous New Bag 8/16/23 0111)   0.9%  NaCl infusion (for blood administration) (has no administration in time range)   sodium chloride 0.9% flush 10 mL (has no administration in time range)   melatonin  tablet 6 mg (has no administration in time range)   lactated ringers infusion (has no administration in time range)   iopamidoL (ISOVUE-370) injection 100 mL (100 mLs Intravenous Given 8/16/23 0203)   iopamidoL (ISOVUE-370) injection 100 mL (100 mLs Intravenous Given 8/16/23 0204)     ED Course as of 08/16/23 0347   Wed Aug 16, 2023   0052 Hemoglobin(!): 7.0 [IB]   0052 WBC: 6.71 [IB]   0052 D-Dimer(!): 1.74 [IB]      ED Course User Index  [IB] Ranjeet Farias, DO        Medical Decision Making  57-year-old male PMHx DM, Atrial Flutter, DVT, Quadraplegia 2/2 C4 spinal cord injury, who presents as transfer from outside ED for surgical evaluation due to having concern for infected decubitus wound to his left hip and possible underlying osteomyelitis, he was given IV Zosyn and vancomycin and PICC line placed in left arm prior to transfer.  He reports recently having some foul-smelling purulent drainage associated with his left hip decubitus wound.  He is had recurrent syncopal episodes with sitting up over the past 2 days, denies having chest pain or shortness of breath or hemoptysis.  He does have history of VTE for which he is on SQ Lovenox.  Previous labs and imaging reviewed. CRP 17.5 which is increased from 5.8 back in June. ESR greater than 140, this is increased from back in June although it had been this high back in May.  It appears that back in May of this year there was concern for possible osteomyelitis due to the extent of his decubitus wound exposing bone and he was started on ertapenem secondary to hx of ESBL and had 6 weeks of therapy.  XR of left hip yesterday showed degenerative changes but no acute findings, although exam was reported as limited due to body habitus.  Vital signs at outside ED showed he did have some hypotension when he initially presented.  CBC today shows no leukocytosis or leukopenia with hemoglobin 7.0, we will transfuse 1 unit PRBCs.  ECG shows atrial flutter with variable AV  block at a rate of 84, no STEMI. BMP shows glucose 228, otherwise unremarkable. D-dimer elevated at 1.74, so will get CTA chest to rule out PE as etiology for his recurrent syncope. Will also get CT abdomen pelvis to evaluate for any abscesses or bony destruction associated with the deep unstageable decubitus wound to his left hip.     CTA Chest shows no PE. Unfortunately due to positioning (inability to passively extend his right leg due to spasticity) the CT was not able to extend down into the area of interest. He did have some episodes of hypotension, although he states that he was not feeling lightheaded with these episodes and maintained normal mentation. Blood pressure went from 74/50 to 119/73 within a short period of time without any intervention. I suspect this is due to autonomic dysfunction, as his spouse reports that when he does feel his blood pressure dropping he will ask her to shake his leg and this will correct his hypotension. Case discussed with General Surgeon, Dr. Tirado, who is aware of the patient and will see in consult. He will admitted to medicine service. I have spoken with the patient and/or caregivers. I have explained the patient's condition, diagnoses and treatment plan based on the information available to me at this time. I have answered the patient's and/or caregiver's questions and addressed any concerns. The patient and/or caregivers have as good an understanding of the patient's diagnosis, condition and treatment plan as can be expected at this point. The patient has been stabilized within the capability of the emergency department. The patient will be transported for further care and management or will be moved to an observation or inpatient service. I have communicated with the staff or medical practitioner taking over this patient's care.     I have personally provided 31 minutes of critical care time exclusive of time spent on separately billable procedures. Time  includes review of laboratory data, radiology results, discussion with consultants, and monitoring for potential decompensation. Interventions were performed as documented above.     Problems Addressed:  Pressure injury of skin, unspecified injury stage, unspecified location: chronic illness or injury with exacerbation, progression, or side effects of treatment  Symptomatic anemia: chronic illness or injury with exacerbation, progression, or side effects of treatment that poses a threat to life or bodily functions    Amount and/or Complexity of Data Reviewed  Independent Historian: spouse  External Data Reviewed: labs, radiology, ECG and notes.     Details: The left ventricle is normal in size with normal systolic function.  Normal left ventricular diastolic function.  The estimated ejection fraction is 60%.  Normal right ventricular size with normal right ventricular systolic function.  Labs: ordered. Decision-making details documented in ED Course.  Radiology: ordered and independent interpretation performed. Decision-making details documented in ED Course.  ECG/medicine tests: ordered and independent interpretation performed. Decision-making details documented in ED Course.  Discussion of management or test interpretation with external provider(s): Case discussed with General Surgery, Dr. Tirado.    Risk  OTC drugs.  Prescription drug management.  Drug therapy requiring intensive monitoring for toxicity.  Decision regarding hospitalization.  Diagnosis or treatment significantly limited by social determinants of health.  Emergency major surgery.  Risk Details: May require operative debridement of his decubitus wounds.    Critical Care  Total time providing critical care: 31 minutes        CLINICAL IMPRESSION:  1. Symptomatic anemia    2. Syncope    3. Pressure injury of skin, unspecified injury stage, unspecified location    4. Hypotension, unspecified hypotension type        DISPOSITION:   ED Disposition  Condition    Observation Stable                    Ranjeet Farias, DO  08/16/23 0342

## 2023-08-16 NOTE — ANESTHESIA PREPROCEDURE EVALUATION
08/16/2023  Antonio Galvan II is a 57 y.o., male.      Pre-op Assessment    I have reviewed the Patient Summary Reports.     I have reviewed the Nursing Notes. I have reviewed the NPO Status.   I have reviewed the Medications.     Review of Systems  Anesthesia Hx:  Denies Family Hx of Anesthesia complications.   Denies Personal Hx of Anesthesia complications.   Social:  No Alcohol Use, Former Smoker    Hematology/Oncology:  Hematology Normal   Oncology Normal     EENT/Dental:EENT/Dental Normal   Cardiovascular:   Dysrhythmias atrial fibrillation ECG has been reviewed. H/o Cardiac Arrest   Pulmonary:   Asthma moderate Sleep Apnea    Renal/:  Renal/ Normal  Neurogenic bladder   Hepatic/GI:  Hepatic/GI Normal    Musculoskeletal:  Musculoskeletal Normal    Neurological:  Neurology Normal Quadraplegia   Endocrine:   Diabetes, well controlled, type 2    Dermatological:  Skin Normal    Psych:  Psychiatric Normal           Physical Exam  General: Cooperative, Alert and Oriented    Airway:  Mallampati: II   Mouth Opening: Normal  TM Distance: Normal  Tongue: Normal  Neck ROM: Normal ROM    Dental:  Intact        Anesthesia Plan  Type of Anesthesia, risks & benefits discussed:    Anesthesia Type: Gen ETT  Intra-op Monitoring Plan: Standard ASA Monitors  Post Op Pain Control Plan: multimodal analgesia  Induction:  IV  Airway Plan: Direct  Informed Consent: Informed consent signed with the Patient and all parties understand the risks and agree with anesthesia plan.  All questions answered. Patient consented to blood products? Yes  ASA Score: 3    Ready For Surgery From Anesthesia Perspective.     .

## 2023-08-17 LAB
ALBUMIN SERPL-MCNC: 1.7 G/DL (ref 3.5–5)
ALBUMIN/GLOB SERPL: 0.4 RATIO (ref 1.1–2)
ALP SERPL-CCNC: 110 UNIT/L (ref 40–150)
ALT SERPL-CCNC: 7 UNIT/L (ref 0–55)
AST SERPL-CCNC: 8 UNIT/L (ref 5–34)
BACTERIA UR CULT: ABNORMAL
BACTERIA UR CULT: ABNORMAL
BASOPHILS # BLD AUTO: 0.03 X10(3)/MCL
BASOPHILS NFR BLD AUTO: 0.6 %
BILIRUB SERPL-MCNC: 0.1 MG/DL
BUN SERPL-MCNC: 15 MG/DL (ref 8.4–25.7)
CALCIUM SERPL-MCNC: 8.2 MG/DL (ref 8.4–10.2)
CHLORIDE SERPL-SCNC: 107 MMOL/L (ref 98–107)
CO2 SERPL-SCNC: 24 MMOL/L (ref 22–29)
CREAT SERPL-MCNC: 0.86 MG/DL (ref 0.73–1.18)
EOSINOPHIL # BLD AUTO: 0.16 X10(3)/MCL (ref 0–0.9)
EOSINOPHIL NFR BLD AUTO: 2.9 %
ERYTHROCYTE [DISTWIDTH] IN BLOOD BY AUTOMATED COUNT: 16.5 % (ref 11.5–17)
EST. AVERAGE GLUCOSE BLD GHB EST-MCNC: 148.5 MG/DL
FERRITIN SERPL-MCNC: 256.89 NG/ML (ref 21.81–274.66)
FOLATE SERPL-MCNC: 14.3 NG/ML (ref 7–31.4)
GFR SERPLBLD CREATININE-BSD FMLA CKD-EPI: >60 MLS/MIN/1.73/M2
GLOBULIN SER-MCNC: 3.8 GM/DL (ref 2.4–3.5)
GLUCOSE SERPL-MCNC: 179 MG/DL (ref 74–100)
HBA1C MFR BLD: 6.8 %
HCT VFR BLD AUTO: 25.7 % (ref 42–52)
HGB BLD-MCNC: 7.6 G/DL (ref 14–18)
IMM GRANULOCYTES # BLD AUTO: 0.03 X10(3)/MCL (ref 0–0.04)
IMM GRANULOCYTES NFR BLD AUTO: 0.6 %
IRON SATN MFR SERPL: 13 % (ref 20–50)
IRON SERPL-MCNC: 19 UG/DL (ref 65–175)
LYMPHOCYTES # BLD AUTO: 1.23 X10(3)/MCL (ref 0.6–4.6)
LYMPHOCYTES NFR BLD AUTO: 22.6 %
MCH RBC QN AUTO: 25.2 PG (ref 27–31)
MCHC RBC AUTO-ENTMCNC: 29.6 G/DL (ref 33–36)
MCV RBC AUTO: 85.4 FL (ref 80–94)
MONOCYTES # BLD AUTO: 0.44 X10(3)/MCL (ref 0.1–1.3)
MONOCYTES NFR BLD AUTO: 8.1 %
NEUTROPHILS # BLD AUTO: 3.55 X10(3)/MCL (ref 2.1–9.2)
NEUTROPHILS NFR BLD AUTO: 65.2 %
NRBC BLD AUTO-RTO: 0 %
PLATELET # BLD AUTO: 320 X10(3)/MCL (ref 130–400)
PMV BLD AUTO: 9.9 FL (ref 7.4–10.4)
POCT GLUCOSE: 188 MG/DL (ref 70–110)
POCT GLUCOSE: 202 MG/DL (ref 70–110)
POCT GLUCOSE: 233 MG/DL (ref 70–110)
POCT GLUCOSE: 262 MG/DL (ref 70–110)
POTASSIUM SERPL-SCNC: 4.7 MMOL/L (ref 3.5–5.1)
PROT SERPL-MCNC: 5.5 GM/DL (ref 6.4–8.3)
RBC # BLD AUTO: 3.01 X10(6)/MCL (ref 4.7–6.1)
SODIUM SERPL-SCNC: 140 MMOL/L (ref 136–145)
TIBC SERPL-MCNC: 131 UG/DL (ref 69–240)
TIBC SERPL-MCNC: 150 UG/DL (ref 250–450)
VIT B12 SERPL-MCNC: 514 PG/ML (ref 213–816)
WBC # SPEC AUTO: 5.44 X10(3)/MCL (ref 4.5–11.5)

## 2023-08-17 PROCEDURE — 63600175 PHARM REV CODE 636 W HCPCS: Performed by: INTERNAL MEDICINE

## 2023-08-17 PROCEDURE — 80053 COMPREHEN METABOLIC PANEL: CPT | Performed by: INTERNAL MEDICINE

## 2023-08-17 PROCEDURE — 82746 ASSAY OF FOLIC ACID SERUM: CPT | Performed by: INTERNAL MEDICINE

## 2023-08-17 PROCEDURE — 85025 COMPLETE CBC W/AUTO DIFF WBC: CPT | Performed by: INTERNAL MEDICINE

## 2023-08-17 PROCEDURE — 25000003 PHARM REV CODE 250: Performed by: INTERNAL MEDICINE

## 2023-08-17 PROCEDURE — 25000242 PHARM REV CODE 250 ALT 637 W/ HCPCS: Performed by: INTERNAL MEDICINE

## 2023-08-17 PROCEDURE — 96372 THER/PROPH/DIAG INJ SC/IM: CPT | Performed by: INTERNAL MEDICINE

## 2023-08-17 PROCEDURE — 11000001 HC ACUTE MED/SURG PRIVATE ROOM

## 2023-08-17 PROCEDURE — 94761 N-INVAS EAR/PLS OXIMETRY MLT: CPT

## 2023-08-17 PROCEDURE — 82728 ASSAY OF FERRITIN: CPT | Performed by: INTERNAL MEDICINE

## 2023-08-17 PROCEDURE — 82607 VITAMIN B-12: CPT | Performed by: INTERNAL MEDICINE

## 2023-08-17 PROCEDURE — 83550 IRON BINDING TEST: CPT | Performed by: INTERNAL MEDICINE

## 2023-08-17 PROCEDURE — 21400001 HC TELEMETRY ROOM

## 2023-08-17 PROCEDURE — 83036 HEMOGLOBIN GLYCOSYLATED A1C: CPT | Performed by: INTERNAL MEDICINE

## 2023-08-17 PROCEDURE — 27000207 HC ISOLATION

## 2023-08-17 PROCEDURE — 94640 AIRWAY INHALATION TREATMENT: CPT

## 2023-08-17 RX ORDER — ASCORBIC ACID 250 MG
500 TABLET ORAL DAILY
Status: DISCONTINUED | OUTPATIENT
Start: 2023-08-17 | End: 2023-08-23 | Stop reason: HOSPADM

## 2023-08-17 RX ORDER — CARVEDILOL 12.5 MG/1
12.5 TABLET ORAL 2 TIMES DAILY WITH MEALS
Status: DISCONTINUED | OUTPATIENT
Start: 2023-08-17 | End: 2023-08-23 | Stop reason: HOSPADM

## 2023-08-17 RX ORDER — SODIUM CHLORIDE 9 MG/ML
INJECTION, SOLUTION INTRAVENOUS
Status: DISCONTINUED | OUTPATIENT
Start: 2023-08-17 | End: 2023-08-23 | Stop reason: HOSPADM

## 2023-08-17 RX ORDER — ZINC SULFATE 50(220)MG
220 CAPSULE ORAL DAILY
Status: DISCONTINUED | OUTPATIENT
Start: 2023-08-17 | End: 2023-08-23 | Stop reason: HOSPADM

## 2023-08-17 RX ADMIN — INSULIN ASPART 4 UNITS: 100 INJECTION, SOLUTION INTRAVENOUS; SUBCUTANEOUS at 05:08

## 2023-08-17 RX ADMIN — MUPIROCIN 1 G: 20 OINTMENT TOPICAL at 09:08

## 2023-08-17 RX ADMIN — SODIUM CHLORIDE: 9 INJECTION, SOLUTION INTRAVENOUS at 10:08

## 2023-08-17 RX ADMIN — CEFEPIME 2 G: 2 INJECTION, POWDER, FOR SOLUTION INTRAVENOUS at 01:08

## 2023-08-17 RX ADMIN — VANCOMYCIN HYDROCHLORIDE 1000 MG: 1 INJECTION, POWDER, LYOPHILIZED, FOR SOLUTION INTRAVENOUS at 01:08

## 2023-08-17 RX ADMIN — ESCITALOPRAM 5 MG: 5 TABLET, FILM COATED ORAL at 09:08

## 2023-08-17 RX ADMIN — CEFEPIME 2 G: 2 INJECTION, POWDER, FOR SOLUTION INTRAVENOUS at 10:08

## 2023-08-17 RX ADMIN — SENNOSIDES AND DOCUSATE SODIUM 2 TABLET: 50; 8.6 TABLET ORAL at 09:08

## 2023-08-17 RX ADMIN — SITAGLIPTIN 100 MG: 100 TABLET, FILM COATED ORAL at 09:08

## 2023-08-17 RX ADMIN — INSULIN ASPART 3 UNITS: 100 INJECTION, SOLUTION INTRAVENOUS; SUBCUTANEOUS at 10:08

## 2023-08-17 RX ADMIN — ENOXAPARIN SODIUM 40 MG: 40 INJECTION SUBCUTANEOUS at 10:08

## 2023-08-17 RX ADMIN — Medication 500 MG: at 09:08

## 2023-08-17 RX ADMIN — ENOXAPARIN SODIUM 40 MG: 40 INJECTION SUBCUTANEOUS at 09:08

## 2023-08-17 RX ADMIN — CARVEDILOL 12.5 MG: 25 TABLET, FILM COATED ORAL at 09:08

## 2023-08-17 RX ADMIN — VANCOMYCIN HYDROCHLORIDE 1000 MG: 1 INJECTION, POWDER, LYOPHILIZED, FOR SOLUTION INTRAVENOUS at 02:08

## 2023-08-17 RX ADMIN — ZINC SULFATE 220 MG (50 MG) CAPSULE 220 MG: CAPSULE at 09:08

## 2023-08-17 RX ADMIN — INSULIN ASPART 2 UNITS: 100 INJECTION, SOLUTION INTRAVENOUS; SUBCUTANEOUS at 05:08

## 2023-08-17 RX ADMIN — BUDESONIDE 0.5 MG: 0.5 INHALANT RESPIRATORY (INHALATION) at 07:08

## 2023-08-17 RX ADMIN — TRAMADOL HYDROCHLORIDE 50 MG: 50 TABLET, FILM COATED ORAL at 02:08

## 2023-08-17 RX ADMIN — INSULIN ASPART 4 UNITS: 100 INJECTION, SOLUTION INTRAVENOUS; SUBCUTANEOUS at 12:08

## 2023-08-17 RX ADMIN — MULTIPLE VITAMINS W/ MINERALS TAB 1 TABLET: TAB at 09:08

## 2023-08-17 RX ADMIN — FERROUS SULFATE TAB 325 MG (65 MG ELEMENTAL FE) 1 EACH: 325 (65 FE) TAB at 09:08

## 2023-08-17 RX ADMIN — CARVEDILOL 12.5 MG: 25 TABLET, FILM COATED ORAL at 05:08

## 2023-08-17 RX ADMIN — TRAMADOL HYDROCHLORIDE 50 MG: 50 TABLET, FILM COATED ORAL at 11:08

## 2023-08-17 RX ADMIN — INSULIN DETEMIR 20 UNITS: 100 INJECTION, SOLUTION SUBCUTANEOUS at 10:08

## 2023-08-17 NOTE — PROGRESS NOTES
[facility] - Medical Surgical Unit  Wound Care    Patient Name: Antonio Galvan II  MRN: [id number]  Date: 8/17/2023  Diagnosis: <principal problem not specified>      Subjective:           Patient ID: Antonio Galvan II is a 57 y.o. male.    Chief Complaint: transfer (Pt sent here from [facility] for surgery. Pt has a wound on left greater trochanter. Pt was at wound care and had a syncopal episode. Pt seen at [facility] and diagnosed with osteomyelitis. Sent here for medicine/surgery)      HPI      Past Medical History:     1. Symptomatic anemia    2. Syncope    3. Pressure injury of skin, unspecified injury stage, unspecified location    4. Hypotension, unspecified hypotension type    5. Pain of left hip    6. Pain of right hip    7. Osteomyelitis, unspecified site, unspecified type      Wound Assessment:           Altered Skin Integrity 05/06/22 1140 Right Heel  Full thickness tissue loss. Subcutaneous fat may be visible but bone, tendon or muscle are not exposed (Active)   05/06/22 1140   Altered Skin Integrity Present on Admission - Did Patient arrive to the hospital with altered skin?: yes   Side: Right   Orientation:    Location: Heel   Wound Number:    Is this injury device related?: No   Primary Wound Type:    Description of Altered Skin Integrity: Full thickness tissue loss. Subcutaneous fat may be visible but bone, tendon or muscle are not exposed   Ankle-Brachial Index:    Pulses:    Removal Indication and Assessment:    Wound Outcome:    (Retired) Wound Length (cm):    (Retired) Wound Width (cm):    (Retired) Depth (cm):    Wound Description (Comments):    Removal Indications:    Wound Image   08/16/23 0949   Dressing Appearance Dried drainage 08/16/23 0949   Drainage Amount Small 08/16/23 0949   Drainage Characteristics/Odor Serosanguineous 08/16/23 0949   Appearance Dressing in place, unable to visualize 08/16/23 2120   Periwound Area Dry;Redness 08/16/23 0949   Wound Length (cm) 4 cm  08/16/23 0949   Wound Width (cm) 4.5 cm 08/16/23 0949   Wound Depth (cm) 0.2 cm 08/16/23 0949   Wound Volume (cm^3) 3.6 cm^3 08/16/23 0949   Wound Surface Area (cm^2) 18 cm^2 08/16/23 0949   Care Cleansed with:;Sterile normal saline 08/16/23 0949   Dressing Applied 08/16/23 0949            Altered Skin Integrity 01/12/23 1530 Sacral spine Full thickness tissue loss with exposed bone, tendon, or muscle. Often includes undermining and tunneling. May extend into muscle and/or supporting structures. (Active)   01/12/23 1530   Altered Skin Integrity Present on Admission - Did Patient arrive to the hospital with altered skin?: yes   Side:    Orientation:    Location: Sacral spine   Wound Number:    Is this injury device related?:    Primary Wound Type:    Description of Altered Skin Integrity: Full thickness tissue loss with exposed bone, tendon, or muscle. Often includes undermining and tunneling. May extend into muscle and/or supporting structures.   Ankle-Brachial Index:    Pulses:    Removal Indication and Assessment:    Wound Outcome:    (Retired) Wound Length (cm):    (Retired) Wound Width (cm):    (Retired) Depth (cm):    Wound Description (Comments):    Removal Indications:    Wound Image   08/16/23 0949   Dressing Appearance Moist drainage 08/16/23 0949   Drainage Amount Scant 08/16/23 0949   Drainage Characteristics/Odor Serosanguineous 08/16/23 0949   Appearance Dressing in place, unable to visualize 08/16/23 2120   Periwound Area Excoriated;Redness;Moist 08/16/23 0949   Wound Length (cm) 12.5 cm 08/16/23 0949   Wound Width (cm) 6 cm 08/16/23 0949   Wound Depth (cm) 0.2 cm 08/16/23 0949   Wound Volume (cm^3) 15 cm^3 08/16/23 0949   Wound Surface Area (cm^2) 75 cm^2 08/16/23 0949   Care Cleansed with:;Sterile normal saline 08/16/23 0949   Dressing Applied 08/16/23 0949            Altered Skin Integrity 01/12/23 1532 Right posterior Thigh Full thickness tissue loss with exposed bone, tendon, or muscle. Often  includes undermining and tunneling. May extend into muscle and/or supporting structures. (Active)   01/12/23 1532   Altered Skin Integrity Present on Admission - Did Patient arrive to the hospital with altered skin?: yes   Side: Right   Orientation: posterior   Location: Thigh   Wound Number:    Is this injury device related?:    Primary Wound Type:    Description of Altered Skin Integrity: Full thickness tissue loss with exposed bone, tendon, or muscle. Often includes undermining and tunneling. May extend into muscle and/or supporting structures.   Ankle-Brachial Index:    Pulses:    Removal Indication and Assessment:    Wound Outcome:    (Retired) Wound Length (cm):    (Retired) Wound Width (cm):    (Retired) Depth (cm):    Wound Description (Comments):    Removal Indications:    Wound Image   08/16/23 0949   Dressing Appearance Moist drainage 08/16/23 0949   Drainage Amount Large 08/16/23 0949   Drainage Characteristics/Odor Serosanguineous 08/16/23 0949   Appearance Dressing in place, unable to visualize 08/16/23 2120   Tissue loss description Full thickness 08/16/23 0949   Periwound Area Dry;[place];Redness 08/16/23 0949   Wound Edges Defined 08/16/23 0949   Wound Length (cm) 15 cm 08/16/23 0949   Wound Width (cm) 16 cm 08/16/23 0949   Wound Depth (cm) 4.8 cm 08/16/23 0949   Wound Volume (cm^3) 1152 cm^3 08/16/23 0949   Wound Surface Area (cm^2) 240 cm^2 08/16/23 0949   Care Cleansed with:;Sterile normal saline 08/16/23 0949   Dressing Applied 08/16/23 0949            Altered Skin Integrity Left Greater trochanter Full thickness tissue loss. Base is covered by slough and/or eschar in the wound bed (Active)       Altered Skin Integrity Present on Admission - Did Patient arrive to the hospital with altered skin?: yes   Side: Left   Orientation:    Location: Greater trochanter   Wound Number:    Is this injury device related?:    Primary Wound Type:    Description of Altered Skin Integrity: Full thickness tissue  loss. Base is covered by slough and/or eschar in the wound bed   Ankle-Brachial Index:    Pulses:    Removal Indication and Assessment:    Wound Outcome:    (Retired) Wound Length (cm):    (Retired) Wound Width (cm):    (Retired) Depth (cm):    Wound Description (Comments):    Removal Indications:    Wound Image   08/16/23 0949   Dressing Appearance Moist drainage 08/16/23 0949   Drainage Amount Large 08/16/23 0949   Drainage Characteristics/Odor Brown;Malodorous 08/16/23 0949   Appearance Dressing in place, unable to visualize 08/16/23 2120   Periwound Area Dry;Leaf 08/16/23 0949   Wound Edges Open 08/16/23 0949   Wound Length (cm) 3 cm 08/16/23 0949   Wound Width (cm) 2.3 cm 08/16/23 0949   Wound Depth (cm) 2.5 cm 08/16/23 0949   Wound Volume (cm^3) 17.25 cm^3 08/16/23 0949   Wound Surface Area (cm^2) 6.9 cm^2 08/16/23 0949   Undermining (depth (cm)/location) from 6-12 oclock, deepest is 4cm from 9-10 oclock 08/16/23 0949   Care Cleansed with:;Sterile normal saline 08/16/23 0949   Dressing Applied 08/16/23 0949            Altered Skin Integrity 08/01/23 1534 Left posterior Ankle Full thickness tissue loss. Base is covered by slough and/or eschar in the wound bed (Active)   08/01/23 1534   Altered Skin Integrity Present on Admission - Did Patient arrive to the hospital with altered skin?: yes   Side: Left   Orientation: posterior   Location: Ankle   Wound Number:    Is this injury device related?:    Primary Wound Type:    Description of Altered Skin Integrity: Full thickness tissue loss. Base is covered by slough and/or eschar in the wound bed   Ankle-Brachial Index:    Pulses: 2 + palpable, strong doppler signal per md   Removal Indication and Assessment:    Wound Outcome:    (Retired) Wound Length (cm):    (Retired) Wound Width (cm):    (Retired) Depth (cm):    Wound Description (Comments):    Removal Indications:    Wound Image   08/16/23 0949   Dressing Appearance Intact;Dry;Clean 08/16/23 0949   Drainage  Amount None 08/16/23 0949   Drainage Characteristics/Odor Malodorous 08/16/23 0800   Appearance Dressing in place, unable to visualize 08/16/23 2120   Periwound Area Dry;[place] 08/16/23 0949   Wound Edges Defined 08/16/23 0949   Wound Length (cm) 6.7 cm 08/16/23 0949   Wound Width (cm) 3.9 cm 08/16/23 0949   Wound Depth (cm) 0.1 cm 08/16/23 0949   Wound Volume (cm^3) 2.613 cm^3 08/16/23 0949   Wound Surface Area (cm^2) 26.13 cm^2 08/16/23 0949   Care Cleansed with:;Sterile normal saline 08/16/23 0949   Dressing Applied 08/16/23 0949            Altered Skin Integrity 08/01/23 1535 Left anterior Ankle Other (comment) Full thickness tissue loss. Base is covered by slough and/or eschar in the wound bed (Active)   08/01/23 1535   Altered Skin Integrity Present on Admission - Did Patient arrive to the hospital with altered skin?: yes   Side: Left   Orientation: anterior   Location: Ankle   Wound Number:    Is this injury device related?: No   Primary Wound Type: Other   Description of Altered Skin Integrity: Full thickness tissue loss. Base is covered by slough and/or eschar in the wound bed   Ankle-Brachial Index:    Pulses: 2+ palpable and strong doppler pulses per MD   Removal Indication and Assessment:    Wound Outcome:    (Retired) Wound Length (cm):    (Retired) Wound Width (cm):    (Retired) Depth (cm):    Wound Description (Comments):    Removal Indications:    Wound Image   08/16/23 0949   Dressing Appearance Dry;Intact;Clean 08/16/23 0949   Drainage Amount None 08/16/23 0949   Appearance Dressing in place, unable to visualize 08/16/23 2120   Periwound Area Dry;[place] 08/16/23 0949   Wound Length (cm) 3.5 cm 08/16/23 0949   Wound Width (cm) 3 cm 08/16/23 0949   Wound Depth (cm) 0.1 cm 08/16/23 0949   Wound Volume (cm^3) 1.05 cm^3 08/16/23 0949   Wound Surface Area (cm^2) 10.5 cm^2 08/16/23 0949   Care Cleansed with:;Sterile normal saline 08/16/23 0949            Incision/Site 08/16/23 2011 Right Hip (Active)    08/16/23 2011   Present Prior to Hospital Arrival?:    Side: Right   Location: Hip   Orientation:    Incision Type:    Closure Method:    Additional Comments:    Removal Indication and Assessment:    Wound Outcome:    Removal Indications:    Appearance Dressing in place, unable to visualize 08/16/23 2120            Incision/Site 08/16/23 2011 Left Hip (Active)   08/16/23 2011   Present Prior to Hospital Arrival?:    Side: Left   Location: Hip   Orientation:    Incision Type:    Closure Method:    Additional Comments:    Removal Indication and Assessment:    Wound Outcome:    Removal Indications:    Appearance Dressing in place, unable to visualize 08/16/23 2120       [REMOVED]      Incision/Site 05/12/23 0941 Right Buttocks (Removed)   05/12/23 0941   Present Prior to Hospital Arrival?:    Side: Right   Location: Buttocks   Orientation:    Incision Type:    Closure Method:    Additional Comments:    Removal Indication and Assessment:    Wound Outcome: Converged   Removal Indications:    Removed 08/16/23 1004   Dressing Appearance Dry;Intact 08/16/23 0345   Appearance Dressing in place, unable to visualize 08/16/23 0345           Plan:     Daily dressing changes by nursing staff. Re-evaluation per wound care in 5-7 days and post-op      Recommendations:   Right heel/left achilles/left anterior foot: cleanse with wound cleanser, paint areas with betadine, apply foam dressings over areas and wrap lightly with kerlix, secure with tape, change daily   Sacrum: cleanse with wound cleanser, apply silver alginate and foam dressing, change daily and prn soilage   Left hip: cleanse with wound cleanser, apply oil emulsion gauze to wound bed, pack with NS moistened 4x4, cover with dry 4x4 gauze, and ABD, secure with tape, change daily and prn soilage   Right posterior thigh: cleanse with wound cleanser, apply oil emulsion gauze to bone in center of wound, cover wound bed with mesalt and silver alginate, 4x4 and ABD pad, secure  with tape, change daily.        Time spent in room:     Nirmala Johnson RN
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
## 2023-08-17 NOTE — PROGRESS NOTES
Pharmacokinetic Initial Assessment: IV Vancomycin    Assessment/Plan:    Initiate intravenous vancomycin with loading dose of 2000 mg once followed by a maintenance dose of vancomycin 1500mg IV every 12 hours  Desired empiric serum trough concentration is 15 to 20 mcg/mL  Draw vancomycin trough level 60 min prior to fourth dose on 8/19 at approximately 0100  Pharmacy will continue to follow and monitor vancomycin.      Please contact pharmacy at extension [phone] with any questions regarding this assessment.     Thank you for the consult,   Rajwinder Young       Patient brief summary:  Antonio Galvan II is a 57 y.o. male initiated on antimicrobial therapy with IV Vancomycin for treatment of suspected bone/joint infection    Drug Allergies:   Review of patient's allergies indicates:  No Known Allergies    Actual Body Weight:   106.4kg    Renal Function:   Estimated Creatinine Clearance: 114.8 mL/min (based on SCr of 0.86 mg/dL).,     Dialysis Method (if applicable):  N/A    CBC (last 72 hours):  Recent Labs   Lab Result Units 08/14/23  1314 08/15/23  1718 08/16/23  0052 08/17/23  0327   WBC x10(3)/mcL 10.04 7.28 6.71 5.44   Hgb g/dL 9.0* 7.1* 7.0* 7.6*   Hemoglobin A1c %  --   --   --  6.8   Hct % 31.8* 24.7* 23.8* 25.7*   Platelet x10(3)/mcL 298 329 346 320   Mono % % 5.6 8.2 6.6 8.1   Eos % % 2.0 2.7 2.7 2.9   Basophil % % 0.0 0.0 0.6 0.6       Metabolic Panel (last 72 hours):  Recent Labs   Lab Result Units 08/14/23  1314 08/14/23  1331 08/15/23  1707 08/15/23  1718 08/16/23  0052 08/16/23  0104 08/17/23  0327   Sodium Level mmol/L 135*  --   --  134* 137  --  140   Potassium Level mmol/L 4.8  --   --  4.6 4.4  --  4.7   Chloride mmol/L 102  --   --  104 105  --  107   Carbon Dioxide mmol/L 21*  --   --  24 23  --  24   Glucose Level mg/dL 228*  --   --  237* 228*  --  179*   Glucose, UA   --  Negative Negative  --   --  Negative  --    Blood Urea Nitrogen mg/dL 23.6  --   --  26.8* 23.0  --  15.0   Creatinine mg/dL  "0.95  --   --  0.89 0.80  --  0.86   Albumin Level g/dL 2.2*  --   --  1.9*  --   --  1.7*   Bilirubin Total mg/dL 0.3  --   --  0.2  --   --  0.1   Alkaline Phosphatase unit/L 136  --   --  98  --   --  110   Aspartate Aminotransferase unit/L 5  --   --  5  --   --  8   Alanine Aminotransferase unit/L 7  --   --  6  --   --  7   Magnesium Level mg/dL  --   --   --  1.90 1.80  --   --    Phosphorus Level mg/dL  --   --   --  3.3  --   --   --        Drug levels (last 3 results):  No results for input(s): "VANCOMYCINRA", "VANCORANDOM", "VANCOMYCINPE", "VANCOPEAK", "VANCOMYCINTR", "VANCOTROUGH" in the last 72 hours.    Microbiologic Results:  Microbiology Results (last 7 days)       Procedure Component Value Units Date/Time    Wound Culture [[id number]] Collected: 08/16/23 2012    Order Status: Sent Specimen: Abscess from Hip, Left Updated: 08/17/23 1205    Fungal Culture [[id number]] Collected: 08/16/23 2035    Order Status: Sent Specimen: Wound from Hip, Right Updated: 08/16/23 2156    Anaerobic Culture [[id number]] Collected: 08/16/23 2035    Order Status: Sent Specimen: Wound from Hip, Right Updated: 08/16/23 2156    Wound Culture [[id number]] Collected: 08/16/23 2035    Order Status: Sent Specimen: Wound from Hip, Right Updated: 08/16/23 2156    Gram Stain [[id number]] Collected: 08/16/23 2035    Order Status: Sent Specimen: Wound from Hip, Right Updated: 08/16/23 2156    Gram Stain [[id number]] Collected: 08/16/23 2012    Order Status: Sent Specimen: Wound from Hip, Left Updated: 08/16/23 2156    Wound Culture [[id number]] Collected: 08/16/23 2012    Order Status: Canceled Specimen: Wound from Hip, Left Updated: 08/16/23 2156    Anaerobic Culture [[id number]] Collected: 08/16/23 2012    Order Status: Sent Specimen: Wound from Hip, Left Updated: 08/16/23 2156    Fungal Culture [[id number]] Collected: 08/16/23 2012    Order Status: Sent Specimen: Wound from Hip, Left Updated: 08/16/23 2156    Anaerobic Culture [[id number]] " Collected: 08/16/23 2034    Order Status: Canceled Specimen: Wound from Hip, Left     Fungal Culture [[id number]] Collected: 08/16/23 2034    Order Status: Canceled Specimen: Wound from Hip, Left     Gram Stain [[id number]] Collected: 08/16/23 2034    Order Status: Canceled Specimen: Wound from Hip, Left     Wound Culture [[id number]] Collected: 08/16/23 2034    Order Status: Canceled Specimen: Wound from Hip, Left     Wound Culture [[id number]] Collected: 08/16/23 2013    Order Status: Canceled Specimen: Wound from Hip, Right     Wound Culture [[id number]] Collected: 08/16/23 1745    Order Status: Sent Specimen: Decubitus from Hip, Left Updated: 08/16/23 1807
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
## 2023-08-17 NOTE — ANESTHESIA POSTPROCEDURE EVALUATION
Anesthesia Post Evaluation    Patient: Antonio Galvan II    Procedure(s) Performed: Procedure(s) (LRB):  INCISION AND DRAINAGE (Bilateral)    Final Anesthesia Type: general      Patient participation: Yes- Able to Participate  Level of consciousness: awake and alert and oriented  Post-procedure vital signs: reviewed and stable  Pain management: adequate  Airway patency: patent    PONV status at discharge: No PONV  Anesthetic complications: no      Cardiovascular status: stable  Respiratory status: unassisted, spontaneous ventilation and room air  Hydration status: euvolemic  Follow-up not needed.          Vitals Value Taken Time   /85 08/16/23 1629   Temp 36.8 °C (98.3 °F) 08/16/23 1629   Pulse 86 08/16/23 1629   Resp 18 08/16/23 1113   SpO2 97 % 08/16/23 1629         No case tracking events are documented in the log.      Pain/Allison Score: Pain Rating Prior to Med Admin: 8 (8/16/2023  9:23 AM)  Pain Rating Post Med Admin: 3 (8/16/2023 10:23 AM)

## 2023-08-18 PROBLEM — L89.314 PRESSURE INJURY OF RIGHT ISCHIUM, STAGE 4: Chronic | Status: ACTIVE | Noted: 2023-03-28

## 2023-08-18 PROBLEM — D50.0 CHRONIC BLOOD LOSS ANEMIA: Status: ACTIVE | Noted: 2023-08-18

## 2023-08-18 PROBLEM — G47.33 OBSTRUCTIVE SLEEP APNEA SYNDROME: Chronic | Status: ACTIVE | Noted: 2022-08-09

## 2023-08-18 PROBLEM — L89.152 SACRAL DECUBITUS ULCER, STAGE II: Status: ACTIVE | Noted: 2023-08-18

## 2023-08-18 PROBLEM — L89.152 SACRAL DECUBITUS ULCER, STAGE II: Chronic | Status: ACTIVE | Noted: 2023-08-18

## 2023-08-18 PROBLEM — M86.9 OSTEOMYELITIS: Chronic | Status: ACTIVE | Noted: 2023-05-26

## 2023-08-18 PROBLEM — D50.0 CHRONIC BLOOD LOSS ANEMIA: Chronic | Status: ACTIVE | Noted: 2023-08-18

## 2023-08-18 PROBLEM — N31.9 NEUROGENIC BLADDER: Status: RESOLVED | Noted: 2022-07-20 | Resolved: 2023-08-18

## 2023-08-18 PROBLEM — I48.92 ATRIAL FLUTTER: Chronic | Status: ACTIVE | Noted: 2022-07-18

## 2023-08-18 LAB
ALBUMIN SERPL-MCNC: 1.8 G/DL (ref 3.5–5)
ALBUMIN/GLOB SERPL: 0.4 RATIO (ref 1.1–2)
ALP SERPL-CCNC: 106 UNIT/L (ref 40–150)
ALT SERPL-CCNC: 6 UNIT/L (ref 0–55)
AST SERPL-CCNC: 3 UNIT/L (ref 5–34)
BASOPHILS # BLD AUTO: 0.03 X10(3)/MCL
BASOPHILS NFR BLD AUTO: 0.5 %
BILIRUB SERPL-MCNC: 0.2 MG/DL
BUN SERPL-MCNC: 16 MG/DL (ref 8.4–25.7)
CALCIUM SERPL-MCNC: 8.4 MG/DL (ref 8.4–10.2)
CHLORIDE SERPL-SCNC: 106 MMOL/L (ref 98–107)
CO2 SERPL-SCNC: 23 MMOL/L (ref 22–29)
CREAT SERPL-MCNC: 0.84 MG/DL (ref 0.73–1.18)
EOSINOPHIL # BLD AUTO: 0.2 X10(3)/MCL (ref 0–0.9)
EOSINOPHIL NFR BLD AUTO: 3.6 %
ERYTHROCYTE [DISTWIDTH] IN BLOOD BY AUTOMATED COUNT: 16.6 % (ref 11.5–17)
GFR SERPLBLD CREATININE-BSD FMLA CKD-EPI: >60 MLS/MIN/1.73/M2
GLOBULIN SER-MCNC: 4.3 GM/DL (ref 2.4–3.5)
GLUCOSE SERPL-MCNC: 289 MG/DL (ref 74–100)
GRAM STN SPEC: NORMAL
HCT VFR BLD AUTO: 29.8 % (ref 42–52)
HGB BLD-MCNC: 8.7 G/DL (ref 14–18)
IMM GRANULOCYTES # BLD AUTO: 0.07 X10(3)/MCL (ref 0–0.04)
IMM GRANULOCYTES NFR BLD AUTO: 1.3 %
LYMPHOCYTES # BLD AUTO: 1.06 X10(3)/MCL (ref 0.6–4.6)
LYMPHOCYTES NFR BLD AUTO: 19.3 %
MCH RBC QN AUTO: 24.6 PG (ref 27–31)
MCHC RBC AUTO-ENTMCNC: 29.2 G/DL (ref 33–36)
MCV RBC AUTO: 84.4 FL (ref 80–94)
MONOCYTES # BLD AUTO: 0.35 X10(3)/MCL (ref 0.1–1.3)
MONOCYTES NFR BLD AUTO: 6.4 %
NEUTROPHILS # BLD AUTO: 3.79 X10(3)/MCL (ref 2.1–9.2)
NEUTROPHILS NFR BLD AUTO: 68.9 %
PLATELET # BLD AUTO: 324 X10(3)/MCL (ref 130–400)
PMV BLD AUTO: 9 FL (ref 7.4–10.4)
POCT GLUCOSE: 174 MG/DL (ref 70–110)
POCT GLUCOSE: 203 MG/DL (ref 70–110)
POCT GLUCOSE: 255 MG/DL (ref 70–110)
POCT GLUCOSE: 277 MG/DL (ref 70–110)
POTASSIUM SERPL-SCNC: 4 MMOL/L (ref 3.5–5.1)
PROT SERPL-MCNC: 6.1 GM/DL (ref 6.4–8.3)
RBC # BLD AUTO: 3.53 X10(6)/MCL (ref 4.7–6.1)
SODIUM SERPL-SCNC: 137 MMOL/L (ref 136–145)
WBC # SPEC AUTO: 5.5 X10(3)/MCL (ref 4.5–11.5)

## 2023-08-18 PROCEDURE — 63600175 PHARM REV CODE 636 W HCPCS: Performed by: INTERNAL MEDICINE

## 2023-08-18 PROCEDURE — 25000003 PHARM REV CODE 250: Performed by: INTERNAL MEDICINE

## 2023-08-18 PROCEDURE — 85025 COMPLETE CBC W/AUTO DIFF WBC: CPT | Performed by: INTERNAL MEDICINE

## 2023-08-18 PROCEDURE — 94640 AIRWAY INHALATION TREATMENT: CPT

## 2023-08-18 PROCEDURE — 21400001 HC TELEMETRY ROOM

## 2023-08-18 PROCEDURE — 27000207 HC ISOLATION

## 2023-08-18 PROCEDURE — 80053 COMPREHEN METABOLIC PANEL: CPT | Performed by: INTERNAL MEDICINE

## 2023-08-18 PROCEDURE — 25000242 PHARM REV CODE 250 ALT 637 W/ HCPCS: Performed by: INTERNAL MEDICINE

## 2023-08-18 PROCEDURE — 11000001 HC ACUTE MED/SURG PRIVATE ROOM

## 2023-08-18 PROCEDURE — 94761 N-INVAS EAR/PLS OXIMETRY MLT: CPT

## 2023-08-18 RX ADMIN — ESCITALOPRAM 5 MG: 5 TABLET, FILM COATED ORAL at 09:08

## 2023-08-18 RX ADMIN — VANCOMYCIN HYDROCHLORIDE 1500 MG: 1.5 INJECTION, POWDER, LYOPHILIZED, FOR SOLUTION INTRAVENOUS at 01:08

## 2023-08-18 RX ADMIN — SODIUM CHLORIDE: 9 INJECTION, SOLUTION INTRAVENOUS at 01:08

## 2023-08-18 RX ADMIN — HYDROCODONE BITARTRATE AND ACETAMINOPHEN 1 TABLET: 10; 325 TABLET ORAL at 08:08

## 2023-08-18 RX ADMIN — SENNOSIDES AND DOCUSATE SODIUM 2 TABLET: 50; 8.6 TABLET ORAL at 08:08

## 2023-08-18 RX ADMIN — SITAGLIPTIN 100 MG: 100 TABLET, FILM COATED ORAL at 09:08

## 2023-08-18 RX ADMIN — MUPIROCIN 1 G: 20 OINTMENT TOPICAL at 10:08

## 2023-08-18 RX ADMIN — FERROUS SULFATE TAB 325 MG (65 MG ELEMENTAL FE) 1 EACH: 325 (65 FE) TAB at 09:08

## 2023-08-18 RX ADMIN — CEFEPIME 2 G: 2 INJECTION, POWDER, FOR SOLUTION INTRAVENOUS at 09:08

## 2023-08-18 RX ADMIN — INSULIN DETEMIR 20 UNITS: 100 INJECTION, SOLUTION SUBCUTANEOUS at 08:08

## 2023-08-18 RX ADMIN — INSULIN ASPART 4 UNITS: 100 INJECTION, SOLUTION INTRAVENOUS; SUBCUTANEOUS at 06:08

## 2023-08-18 RX ADMIN — CARVEDILOL 12.5 MG: 25 TABLET, FILM COATED ORAL at 09:08

## 2023-08-18 RX ADMIN — CARVEDILOL 12.5 MG: 25 TABLET, FILM COATED ORAL at 04:08

## 2023-08-18 RX ADMIN — ENOXAPARIN SODIUM 40 MG: 40 INJECTION SUBCUTANEOUS at 08:08

## 2023-08-18 RX ADMIN — INSULIN ASPART 3 UNITS: 100 INJECTION, SOLUTION INTRAVENOUS; SUBCUTANEOUS at 08:08

## 2023-08-18 RX ADMIN — VANCOMYCIN HYDROCHLORIDE 1500 MG: 1.5 INJECTION, POWDER, LYOPHILIZED, FOR SOLUTION INTRAVENOUS at 02:08

## 2023-08-18 RX ADMIN — Medication 500 MG: at 09:08

## 2023-08-18 RX ADMIN — ZINC SULFATE 220 MG (50 MG) CAPSULE 220 MG: CAPSULE at 09:08

## 2023-08-18 RX ADMIN — BUDESONIDE 0.5 MG: 0.5 INHALANT RESPIRATORY (INHALATION) at 07:08

## 2023-08-18 RX ADMIN — CEFEPIME 2 G: 2 INJECTION, POWDER, FOR SOLUTION INTRAVENOUS at 12:08

## 2023-08-18 RX ADMIN — MULTIPLE VITAMINS W/ MINERALS TAB 1 TABLET: TAB at 09:08

## 2023-08-18 RX ADMIN — CEFEPIME 2 G: 2 INJECTION, POWDER, FOR SOLUTION INTRAVENOUS at 05:08

## 2023-08-18 RX ADMIN — SENNOSIDES AND DOCUSATE SODIUM 2 TABLET: 50; 8.6 TABLET ORAL at 09:08

## 2023-08-18 RX ADMIN — INSULIN ASPART 6 UNITS: 100 INJECTION, SOLUTION INTRAVENOUS; SUBCUTANEOUS at 04:08

## 2023-08-18 RX ADMIN — ENOXAPARIN SODIUM 40 MG: 40 INJECTION SUBCUTANEOUS at 09:08

## 2023-08-18 RX ADMIN — MUPIROCIN 1 G: 20 OINTMENT TOPICAL at 09:08

## 2023-08-18 NOTE — HOSPITAL COURSE
08/18/2023.    Patient is stable.  Denies any bleeding.  No fever no palpitation   Continue IV antibiotics.  Follow up with culture and sensitivity.  Continue wound care  Check H&H in the morning.  Consider iron transfusion since iron in the low side.   08/19/2023.    Patient is stable no new findings.  08/20/2023.    Stable.  No fever no chills.  No new issues overnight  Cultures grew Pseudomonas and staph.    Discussed case with family.  Most likely patient required again long-term IV antibiotic, wound care and possible more debridement    8/21/23-Patient is doing well.  We are waiting for outpatient IV antibiotics to be set up.  His pcp is out of town so we are having trouble having someone sign off. Otherwise he is ready for discharge once OP IV antibiotics are set up.    8/22: Family changed their mind and now want to go to [facility] bridge swing bed.... concerned about wound care outpatient however this will be a chronic issue with a patient that is paralyzed

## 2023-08-18 NOTE — PROGRESS NOTES
Inpatient Nutrition Assessment    Admit Date: 8/16/2023   Total duration of encounter: 2 days     Nutrition Recommendation/Prescription     Rec'd continue Diabetic Diet with double protein on meal trays.   Continue Cole, mixed with Diet Sprite, BID, to assist with healing.   Add 500 mg Vitamin C + 200 mg Zinc Sulfate daily to aid in wound healing.   Boost Glucose control TID (Chocolate or Strawberry - Rotate Flavors). Provides 190 kcal, 16 g protein per serving.   Monitor intake, weight, and labs.     RD following and available as needed. Thank you.     Communication of Recommendations: reviewed with nurse, reviewed with patient, reviewed with caregiver, reviewed with family, and EMR.     Nutrition Assessment     Malnutrition Assessment/Nutrition-Focused Physical Exam    Malnutrition in the context of acute illness or injury  Degree of Malnutrition: non-severe (moderate) malnutrition  Energy Intake: does not meet criteria  Interpretation of Weight Loss: does not meet criteria  Body Fat:mild depletion  Area of Body Fat Loss: orbital region   Muscle Mass Loss: mild depletion  Area of Muscle Mass Loss: temple region - temporalis muscle, clavicle bone region - pectoralis major, deltoid, trapezius muscles, and clavicle and acromion bone region - deltoid muscle  Fluid Accumulation: unable to obtain  Edema: unable to obtain   Reduced  Strength: unable to obtain  A minimum of two characteristics is recommended for diagnosis of either severe or non-severe malnutrition.    Chart Review    Reason Seen: follow-up    Malnutrition Screening Tool Results   Have you recently lost weight without trying?: Yes: Unsure how much  Have you been eating poorly because of a decreased appetite?: Yes   MST Score: 3     Diagnosis:  Left anterior foot eschar unstageable ulcer POA  Left posterior heel eschar unstageable ulcer POA  Large right posterior hip gluteal ulcer stage IV POA  Stage II sacral decubitus ulcer POA  Left lateral hip  stage IV ulcer POA  Multiple sites of infected wound  Orthostatic hypotension verses iatrogenic hypotension versus infected wound  Normocytic anemia/anemia of chronic disease         Pressure injury of right ischium, stage 4  L89.314 707.05       707.24   2. 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, stage 4, unspecified laterality  L89.44 707.09       707.24   3. Pressure injury of trochanteric region of left hip, stage 4  L89.224 707.04       707.24   4. Pressure injury of right heel, stage 3  L89.613 707.07       707.23   5. Pressure injury of left ankle, unstageable  L89.520 707.06       707.25   6. Acute osteomyelitis          Relevant Medical History:   A-fib      Cardiac arrest  7/2022    Chronic skin ulcer      DM (diabetes mellitus)      Infected decubitus ulcer      Lymphedema of leg      Neurogenic bladder      Obesity, unspecified      Pressure ulcer of heel      Pressure ulcer of right foot, stage 3      Pressure ulcer of right ischium      Quadriplegia      Quadriplegic spinal paralysis          Nutrition-Related Medications:   Sitagliptin phosphate; Senna-docusate; Ferrous Sulfate; Lovenox; Insulin.      Calorie Containing IV Medications: no significant kcals from medications at this time    Nutrition-Related Labs:  8/16: H/H 7.0/23.8(L); (H); Ca+ 8.2(L)    Diet/PN Order: Diet diabetic Double Protein  Oral Supplement Order: none  Tube Feeding Order: none  Appetite/Oral Intake: NPO/NPO  Factors Affecting Nutritional Intake: NPO  Food/Latter day/Cultural Preferences:  Likes Strawberry and Chocolate Flavors.   Food Allergies: none reported    Skin Integrity: wound  Wound(s):      Altered Skin Integrity 01/12/23 1532 Right posterior Thigh Full thickness tissue loss with exposed bone, tendon, or muscle. Often includes undermining and tunneling. May extend into muscle and/or supporting structures.-Tissue loss description: Full thickness     Comments    8/18: Pt reports good  "appetite and intake. Consuming 50 - 75% of meals so far. S/P debridement. Emphasized the importance of Cole to assist with optimal healing. Pt is receiving Cole on meal trays; however, kitchen mixes Cole and pt did not know what the "orange drink was" did not drink. Offered to send packet on tray with Diet Sprite and pt or family will mix. Pt prefers to mix Cole. Notified kitchen.  BP have been good. Labs and meds reviewed. No new weights. Awaiting results of cultures. Will continue to monitor during stay.     : Pt states his appetite is good; however, reports weight loss. States UBW was about 270# 3 to 6 months ago. Family at bedside confirm weight loss. Noted multiple wounds. Goes to wound care. Pt has hx of anemia. States he is Diabetic but does not follow a Diabetic Diet at home. Last HgbA1C (6.9) on . Emphasized the importance of not skipping meals, following a Diabetic Diet, as well as, consuming adequate protein, Vitamin C, Zinc, and Vitamin A to assist with wound healing. Pt agreed to Cole and Boost Glucose Control - Would like Strawberry or Chocolate - Rotate flavors. States he drinks protein shakes at home. Pt states he is hungry and has not eaten since ~ 8:00 last night. Discussed recommendations with nsg and nsg reports pt is to remain NPO for surgery evaluation. Will continue to monitor during stay.     Anthropometrics    Height: 5' 7.99" (172.7 cm) Height Method: Stated  Last Weight: 106.4 kg (234 lb 9.6 oz) (23 0045) Weight Method: Bed Scale  BMI (Calculated): 35.7  BMI Classification: obese grade II (BMI 35-39.9)        Ideal Body Weight (IBW), Male: 153.94 lb     % Ideal Body Weight, Male (lb): 152.34 %                 Usual Body Weight (UBW), k kg  % Usual Body Weight: 86.7     Usual Weight Provided By: patient    Wt Readings from Last 5 Encounters:   23 106.4 kg (234 lb 9.6 oz)   08/15/23 113.4 kg (250 lb)   23 108.9 kg (240 lb)   23 100.7 kg (222 lb 0.1 " oz)   23 100.7 kg (222 lb 0.1 oz)     Weight Change(s) Since Admission:  Admit Weight: 106.4 kg (234 lb 9.6 oz) (23 0045)  #  8/18: No new weights.     Estimated Needs    Weight Used For Calorie Calculations: 106 kg (233 lb 11 oz)  Energy Calorie Requirements (kcal): 2100 to 2200 kcal/day (20 to 22kcals/kg/CBW)  Energy Need Method: Kcal/kg  Weight Used For Protein Calculations: 70 kg (154 lb 5.2 oz) (IBW used to estimate protein needs.)  Protein Requirements: 105 to 140 g/day (1.5 to 2.0 g/kg/IBW)  Fluid Requirements (mL): 2200 mL/day (1mL/kcal)  Temp (24hrs), Av.6 °F (37 °C), Min:97.8 °F (36.6 °C), Max:99.1 °F (37.3 °C)       Enteral Nutrition    Patient not receiving enteral nutrition at this time.    Parenteral Nutrition    Patient not receiving parenteral nutrition support at this time.    Evaluation of Received Nutrient Intake    Calories: meeting estimated needs  Protein: meeting estimated needs    Patient Education    Not applicable.    Nutrition Diagnosis     PES: Increased nutrient needs related to increased protein energy demand for wound healing as evidenced by Pt with multiple wounds. (continues)    Interventions/Goals     Intervention(s): modified composition of meals/snacks, commercial beverage, multivitamin/mineral supplement therapy, and collaboration with other providers  Goal: Meet greater than 75% of nutritional needs by follow-up. (goal met)    Monitoring & Evaluation     Dietitian will monitor energy intake, weight, weight change, electrolyte/renal panel, glucose/endocrine profile, and gastrointestinal profile.  Nutrition Risk/Follow-Up: high (follow-up in 1-4 days)   Please consult if re-assessment needed sooner.
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
## 2023-08-18 NOTE — ASSESSMENT & PLAN NOTE
Status post I&D.    Continue IV antibiotic.  Follow up with culture and sensitivity   Continue wound care.
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
## 2023-08-18 NOTE — PROGRESS NOTES
[facility] - Medical Surgical Unit  Wound Care    Patient Name: Antonio Galvan II  MRN: [id number]  Date: 8/18/2023  Diagnosis: <principal problem not specified>      Subjective:           Patient ID: Antonio Galvan II is a 57 y.o. male.    Chief Complaint: transfer (Pt sent here from [facility] (formerly [facility]] for surgery. Pt has a wound on left greater trochanter. Pt was at wound care and had a syncopal episode. Pt seen at [facility] (formerly [facility]] and diagnosed with osteomyelitis. Sent here for medicine/surgery)      HPI      Past Medical History:     1. Symptomatic anemia    2. Syncope    3. Pressure injury of skin, unspecified injury stage, unspecified location    4. Hypotension, unspecified hypotension type    5. Pain of left hip    6. Pain of right hip    7. Osteomyelitis, unspecified site, unspecified type      Wound Assessment:           Altered Skin Integrity 05/06/22 1140 Right Heel  Full thickness tissue loss. Subcutaneous fat may be visible but bone, tendon or muscle are not exposed (Active)   05/06/22 1140   Altered Skin Integrity Present on Admission - Did Patient arrive to the hospital with altered skin?: yes   Side: Right   Orientation:    Location: Heel   Wound Number:    Is this injury device related?: No   Primary Wound Type:    Description of Altered Skin Integrity: Full thickness tissue loss. Subcutaneous fat may be visible but bone, tendon or muscle are not exposed   Ankle-Brachial Index:    Pulses:    Removal Indication and Assessment:    Wound Outcome:    (Retired) Wound Length (cm):    (Retired) Wound Width (cm):    (Retired) Depth (cm):    Wound Description (Comments):    Removal Indications:    Wound Image   08/16/23 0949   Dressing Appearance Intact 08/17/23 0800   Drainage Amount Small 08/16/23 0949   Drainage Characteristics/Odor Serosanguineous 08/16/23 0949   Appearance Dressing in place, unable to visualize 08/17/23 2000   Periwound Area Dry;Redness 08/16/23 0949   Wound Length (cm) 4 cm 08/16/23  0949   Wound Width (cm) 4.5 cm 08/16/23 0949   Wound Depth (cm) 0.2 cm 08/16/23 0949   Wound Volume (cm^3) 3.6 cm^3 08/16/23 0949   Wound Surface Area (cm^2) 18 cm^2 08/16/23 0949   Care Cleansed with:;Sterile normal saline 08/16/23 0949   Dressing Applied 08/16/23 0949            Altered Skin Integrity 01/12/23 1530 Sacral spine Full thickness tissue loss with exposed bone, tendon, or muscle. Often includes undermining and tunneling. May extend into muscle and/or supporting structures. (Active)   01/12/23 1530   Altered Skin Integrity Present on Admission - Did Patient arrive to the hospital with altered skin?: yes   Side:    Orientation:    Location: Sacral spine   Wound Number:    Is this injury device related?:    Primary Wound Type:    Description of Altered Skin Integrity: Full thickness tissue loss with exposed bone, tendon, or muscle. Often includes undermining and tunneling. May extend into muscle and/or supporting structures.   Ankle-Brachial Index:    Pulses:    Removal Indication and Assessment:    Wound Outcome:    (Retired) Wound Length (cm):    (Retired) Wound Width (cm):    (Retired) Depth (cm):    Wound Description (Comments):    Removal Indications:    Wound Image   08/16/23 0949   Dressing Appearance other (see comments) 08/17/23 0800   Drainage Amount Scant 08/16/23 0949   Drainage Characteristics/Odor Serosanguineous 08/16/23 0949   Appearance Dressing in place, unable to visualize 08/17/23 2000   Periwound Area Excoriated;Redness;Moist 08/16/23 0949   Wound Length (cm) 12.5 cm 08/16/23 0949   Wound Width (cm) 6 cm 08/16/23 0949   Wound Depth (cm) 0.2 cm 08/16/23 0949   Wound Volume (cm^3) 15 cm^3 08/16/23 0949   Wound Surface Area (cm^2) 75 cm^2 08/16/23 0949   Care Cleansed with:;Sterile normal saline 08/16/23 0949   Dressing Applied 08/16/23 0949            Altered Skin Integrity 01/12/23 1532 Right posterior Thigh Full thickness tissue loss with exposed bone, tendon, or muscle. Often  includes undermining and tunneling. May extend into muscle and/or supporting structures. (Active)   01/12/23 1532   Altered Skin Integrity Present on Admission - Did Patient arrive to the hospital with altered skin?: yes   Side: Right   Orientation: posterior   Location: Thigh   Wound Number:    Is this injury device related?:    Primary Wound Type:    Description of Altered Skin Integrity: Full thickness tissue loss with exposed bone, tendon, or muscle. Often includes undermining and tunneling. May extend into muscle and/or supporting structures.   Ankle-Brachial Index:    Pulses:    Removal Indication and Assessment:    Wound Outcome:    (Retired) Wound Length (cm):    (Retired) Wound Width (cm):    (Retired) Depth (cm):    Wound Description (Comments):    Removal Indications:    Wound Image   08/18/23 1109   Dressing Appearance Moist drainage 08/18/23 1109   Drainage Amount Moderate 08/18/23 1109   Drainage Characteristics/Odor Serosanguineous 08/18/23 1109   Appearance Pink;Red;Black;Tan;Moist;Adipose 08/18/23 1109   Tissue loss description Full thickness 08/16/23 0949   Periwound Area Dry;[place] 08/18/23 1109   Wound Edges Defined;Open 08/18/23 1109   Wound Length (cm) 12 cm 08/18/23 1109   Wound Width (cm) 14.5 cm 08/18/23 1109   Wound Depth (cm) 1.7 cm 08/18/23 1109   Wound Volume (cm^3) 295.8 cm^3 08/18/23 1109   Wound Surface Area (cm^2) 174 cm^2 08/18/23 1109   Care Cleansed with:;Sterile normal saline 08/18/23 1109   Dressing Applied 08/18/23 1109   Dressing Change Due 08/19/23 08/18/23 1109            Altered Skin Integrity Left Greater trochanter Full thickness tissue loss. Base is covered by slough and/or eschar in the wound bed (Active)       Altered Skin Integrity Present on Admission - Did Patient arrive to the hospital with altered skin?: yes   Side: Left   Orientation:    Location: Greater trochanter   Wound Number:    Is this injury device related?:    Primary Wound Type:    Description of Altered  Skin Integrity: Full thickness tissue loss. Base is covered by slough and/or eschar in the wound bed   Ankle-Brachial Index:    Pulses:    Removal Indication and Assessment:    Wound Outcome:    (Retired) Wound Length (cm):    (Retired) Wound Width (cm):    (Retired) Depth (cm):    Wound Description (Comments):    Removal Indications:    Wound Image   08/18/23 1109   Dressing Appearance Moist drainage 08/18/23 1109   Drainage Amount Large 08/18/23 1109   Drainage Characteristics/Odor Serosanguineous 08/18/23 1109   Appearance Pink;Red;Black;Moist;Adipose;Bone 08/18/23 1109   Periwound Area Dry;[place] 08/18/23 1109   Wound Edges Open 08/18/23 1109   Wound Length (cm) 11.5 cm 08/18/23 1109   Wound Width (cm) 4.4 cm 08/18/23 1109   Wound Depth (cm) 4.7 cm 08/18/23 1109   Wound Volume (cm^3) 237.82 cm^3 08/18/23 1109   Wound Surface Area (cm^2) 50.6 cm^2 08/18/23 1109   Undermining (depth (cm)/location) from 6-12 oclock, deepest is 4cm from 9-10 oclock 08/16/23 0949   Care Cleansed with:;Sterile normal saline 08/18/23 1109   Dressing Applied 08/18/23 1109   Dressing Change Due 08/19/23 08/18/23 1109            Altered Skin Integrity 08/01/23 1534 Left posterior Ankle Full thickness tissue loss. Base is covered by slough and/or eschar in the wound bed (Active)   08/01/23 1534   Altered Skin Integrity Present on Admission - Did Patient arrive to the hospital with altered skin?: yes   Side: Left   Orientation: posterior   Location: Ankle   Wound Number:    Is this injury device related?:    Primary Wound Type:    Description of Altered Skin Integrity: Full thickness tissue loss. Base is covered by slough and/or eschar in the wound bed   Ankle-Brachial Index:    Pulses: 2 + palpable, strong doppler signal per md   Removal Indication and Assessment:    Wound Outcome:    (Retired) Wound Length (cm):    (Retired) Wound Width (cm):    (Retired) Depth (cm):    Wound Description (Comments):    Removal Indications:    Wound Image    08/16/23 0949   Dressing Appearance Intact 08/17/23 0800   Drainage Amount None 08/16/23 0949   Drainage Characteristics/Odor Malodorous 08/16/23 0800   Appearance Dressing in place, unable to visualize 08/17/23 2000   Periwound Area Dry;[place] 08/16/23 0949   Wound Edges Defined 08/16/23 0949   Wound Length (cm) 6.7 cm 08/16/23 0949   Wound Width (cm) 3.9 cm 08/16/23 0949   Wound Depth (cm) 0.1 cm 08/16/23 0949   Wound Volume (cm^3) 2.613 cm^3 08/16/23 0949   Wound Surface Area (cm^2) 26.13 cm^2 08/16/23 0949   Care Cleansed with:;Sterile normal saline 08/16/23 0949   Dressing Applied 08/16/23 0949            Altered Skin Integrity 08/01/23 1535 Left anterior Ankle Other (comment) Full thickness tissue loss. Base is covered by slough and/or eschar in the wound bed (Active)   08/01/23 1535   Altered Skin Integrity Present on Admission - Did Patient arrive to the hospital with altered skin?: yes   Side: Left   Orientation: anterior   Location: Ankle   Wound Number:    Is this injury device related?: No   Primary Wound Type: Other   Description of Altered Skin Integrity: Full thickness tissue loss. Base is covered by slough and/or eschar in the wound bed   Ankle-Brachial Index:    Pulses: 2+ palpable and strong doppler pulses per MD   Removal Indication and Assessment:    Wound Outcome:    (Retired) Wound Length (cm):    (Retired) Wound Width (cm):    (Retired) Depth (cm):    Wound Description (Comments):    Removal Indications:    Wound Image   08/16/23 0949   Dressing Appearance Intact 08/17/23 0800   Drainage Amount None 08/16/23 0949   Appearance Dressing in place, unable to visualize 08/17/23 2000   Periwound Area Dry;[place] 08/16/23 0949   Wound Length (cm) 3.5 cm 08/16/23 0949   Wound Width (cm) 3 cm 08/16/23 0949   Wound Depth (cm) 0.1 cm 08/16/23 0949   Wound Volume (cm^3) 1.05 cm^3 08/16/23 0949   Wound Surface Area (cm^2) 10.5 cm^2 08/16/23 0922   Care Cleansed with:;Sterile normal saline 08/16/23 0969           "  Incision/Site 08/16/23 2011 Right Hip (Active)   08/16/23 2011   Present Prior to Hospital Arrival?:    Side: Right   Location: Hip   Orientation:    Incision Type:    Closure Method:    Additional Comments:    Removal Indication and Assessment:    Wound Outcome:    Removal Indications:    Incision WDL ex 08/17/23 0800   Dressing Appearance Intact 08/17/23 2000   Appearance Dressing in place, unable to visualize 08/17/23 2000            Incision/Site 08/16/23 2011 Left Hip (Active)   08/16/23 2011   Present Prior to Hospital Arrival?:    Side: Left   Location: Hip   Orientation:    Incision Type:    Closure Method:    Additional Comments:    Removal Indication and Assessment:    Wound Outcome:    Removal Indications:    Incision WDL ex 08/17/23 0800   Dressing Appearance Area marked 08/17/23 2000   Appearance Dressing in place, unable to visualize 08/17/23 2000       [REMOVED]      Incision/Site 05/12/23 0941 Right Buttocks (Removed)   05/12/23 0941   Present Prior to Hospital Arrival?:    Side: Right   Location: Buttocks   Orientation:    Incision Type:    Closure Method:    Additional Comments:    Removal Indication and Assessment:    Wound Outcome: Converged   Removal Indications:    Removed 08/16/23 1004   Dressing Appearance Dry;Intact 08/16/23 0345   Appearance Dressing in place, unable to visualize 08/16/23 0345           Plan:     Daily dressing changes by nursing staff. Re-evaluation per wound care in 5-7 days       Recommendations:   Right heel/left achilles/left anterior foot: cleanse with wound cleanser, paint areas with betadine, apply foam dressings over areas and wrap lightly with kerlix, secure with tape, change daily   Sacrum: cleanse with wound cleanser, apply silver alginate and foam dressing, change daily and prn soilage   Left hip: cleanse with wound cleanser, apply oil emulsion gauze to wound bed, pack with Dakins solution moistened 2" kerlix, cover with dry 4x4 gauze, and ABD, secure with " "tape, change daily and prn soilage   Right posterior thigh: cleanse with wound cleanser, pack with Dakins solution moistened 2" kerlix, cover wound bed dry 4x4 gauze and ABD pad, secure with tape, change daily.        Time spent in room:     Nirmala Johnson RN   "

## 2023-08-18 NOTE — ASSESSMENT & PLAN NOTE
Bone scan performed showed questionable osteomyelitis.  Will follow up with culture and sensitivity of the bone obtain during the I and D done by surgery.  Suspect highly suspicious for osteomyelitis.

## 2023-08-18 NOTE — SUBJECTIVE & OBJECTIVE
Interval History:  Patient stable.  Denies any nausea, vomiting diarrhea.  Nurses report new issues overnight    Review of Systems   HENT: Negative.     Cardiovascular: Negative.    Gastrointestinal: Negative.    All other systems reviewed and are negative.    Objective:     Vital Signs (Most Recent):  Temp: 98.4 °F (36.9 °C) (08/18/23 1608)  Pulse: 79 (08/18/23 1608)  Resp: 18 (08/18/23 0716)  BP: (!) 141/91 (08/18/23 1608)  SpO2: (!) 94 % (08/18/23 1608) Vital Signs (24h Range):  Temp:  [97.8 °F (36.6 °C)-99 °F (37.2 °C)] 98.4 °F (36.9 °C)  Pulse:  [64-83] 79  Resp:  [17-20] 18  SpO2:  [91 %-97 %] 94 %  BP: ()/(61-91) 141/91     Weight: 106.4 kg (234 lb 9.6 oz)  Body mass index is 35.68 kg/m².    Intake/Output Summary (Last 24 hours) at 8/18/2023 1752  Last data filed at 8/18/2023 1500  Gross per 24 hour   Intake 1753.55 ml   Output 2450 ml   Net -696.45 ml         Physical Exam  Vitals and nursing note reviewed.   Constitutional:       General: He is not in acute distress.     Appearance: He is obese. He is ill-appearing. He is not toxic-appearing or diaphoretic.   HENT:      Head: Normocephalic.      Nose: Nose normal.      Mouth/Throat:      Pharynx: Oropharynx is clear.   Eyes:      Pupils: Pupils are equal, round, and reactive to light.   Cardiovascular:      Rate and Rhythm: Normal rate.   Pulmonary:      Effort: Pulmonary effort is normal.      Breath sounds: Rales present. No rhonchi.   Chest:      Chest wall: No tenderness.   Abdominal:      General: Bowel sounds are normal. There is distension.      Palpations: Abdomen is soft.      Tenderness: There is abdominal tenderness. There is no guarding.   Musculoskeletal:      Cervical back: Normal range of motion.      Comments: Almost paraplegic  Contracture deformities.   Skin:     Capillary Refill: Capillary refill takes 2 to 3 seconds.      Comments: Unchanged decubitus ulcers compared with 08/17/2023   Neurological:      Mental Status: He is alert.  Mental status is at baseline.   Psychiatric:         Mood and Affect: Mood normal.             Significant Labs: All pertinent labs within the past 24 hours have been reviewed.  Recent Lab Results         08/18/23  1656   08/18/23  1118   08/18/23  0629   08/17/23  2158        POCT Glucose 255   174   203   262               Significant Imaging: I have reviewed all pertinent imaging results/findings within the past 24 hours.

## 2023-08-19 LAB
ALBUMIN SERPL-MCNC: 1.7 G/DL (ref 3.5–5)
ALBUMIN/GLOB SERPL: 0.4 RATIO (ref 1.1–2)
ALP SERPL-CCNC: 97 UNIT/L (ref 40–150)
ALT SERPL-CCNC: 5 UNIT/L (ref 0–55)
AST SERPL-CCNC: 3 UNIT/L (ref 5–34)
BACTERIA BLD CULT: NORMAL
BILIRUB SERPL-MCNC: 0.2 MG/DL
BUN SERPL-MCNC: 16 MG/DL (ref 8.4–25.7)
CALCIUM SERPL-MCNC: 8.3 MG/DL (ref 8.4–10.2)
CHLORIDE SERPL-SCNC: 106 MMOL/L (ref 98–107)
CO2 SERPL-SCNC: 23 MMOL/L (ref 22–29)
CREAT SERPL-MCNC: 0.85 MG/DL (ref 0.73–1.18)
CRP SERPL-MCNC: 85.8 MG/L
ERYTHROCYTE [SEDIMENTATION RATE] IN BLOOD: >140 MM/HR (ref 0–15)
GFR SERPLBLD CREATININE-BSD FMLA CKD-EPI: >60 MLS/MIN/1.73/M2
GLOBULIN SER-MCNC: 4.1 GM/DL (ref 2.4–3.5)
GLUCOSE SERPL-MCNC: 230 MG/DL (ref 74–100)
POCT GLUCOSE: 137 MG/DL (ref 70–110)
POCT GLUCOSE: 184 MG/DL (ref 70–110)
POCT GLUCOSE: 205 MG/DL (ref 70–110)
POTASSIUM SERPL-SCNC: 3.8 MMOL/L (ref 3.5–5.1)
PROT SERPL-MCNC: 5.8 GM/DL (ref 6.4–8.3)
SODIUM SERPL-SCNC: 138 MMOL/L (ref 136–145)
VANCOMYCIN TROUGH SERPL-MCNC: 37.7 UG/ML (ref 15–20)

## 2023-08-19 PROCEDURE — 63600175 PHARM REV CODE 636 W HCPCS: Performed by: INTERNAL MEDICINE

## 2023-08-19 PROCEDURE — 80053 COMPREHEN METABOLIC PANEL: CPT | Performed by: EMERGENCY MEDICINE

## 2023-08-19 PROCEDURE — 94761 N-INVAS EAR/PLS OXIMETRY MLT: CPT

## 2023-08-19 PROCEDURE — 11000001 HC ACUTE MED/SURG PRIVATE ROOM

## 2023-08-19 PROCEDURE — 80202 ASSAY OF VANCOMYCIN: CPT | Performed by: INTERNAL MEDICINE

## 2023-08-19 PROCEDURE — 21400001 HC TELEMETRY ROOM

## 2023-08-19 PROCEDURE — 27000207 HC ISOLATION

## 2023-08-19 PROCEDURE — 25000242 PHARM REV CODE 250 ALT 637 W/ HCPCS: Performed by: INTERNAL MEDICINE

## 2023-08-19 PROCEDURE — 25000003 PHARM REV CODE 250: Performed by: INTERNAL MEDICINE

## 2023-08-19 PROCEDURE — 94640 AIRWAY INHALATION TREATMENT: CPT

## 2023-08-19 PROCEDURE — 85652 RBC SED RATE AUTOMATED: CPT | Performed by: EMERGENCY MEDICINE

## 2023-08-19 PROCEDURE — 86140 C-REACTIVE PROTEIN: CPT | Performed by: EMERGENCY MEDICINE

## 2023-08-19 RX ADMIN — ZINC SULFATE 220 MG (50 MG) CAPSULE 220 MG: CAPSULE at 10:08

## 2023-08-19 RX ADMIN — TRAMADOL HYDROCHLORIDE 50 MG: 50 TABLET, FILM COATED ORAL at 10:08

## 2023-08-19 RX ADMIN — SENNOSIDES AND DOCUSATE SODIUM 2 TABLET: 50; 8.6 TABLET ORAL at 10:08

## 2023-08-19 RX ADMIN — BUDESONIDE 0.5 MG: 0.5 INHALANT RESPIRATORY (INHALATION) at 07:08

## 2023-08-19 RX ADMIN — ESCITALOPRAM 5 MG: 5 TABLET, FILM COATED ORAL at 10:08

## 2023-08-19 RX ADMIN — MULTIPLE VITAMINS W/ MINERALS TAB 1 TABLET: TAB at 10:08

## 2023-08-19 RX ADMIN — Medication 500 MG: at 10:08

## 2023-08-19 RX ADMIN — ENOXAPARIN SODIUM 40 MG: 40 INJECTION SUBCUTANEOUS at 08:08

## 2023-08-19 RX ADMIN — CEFEPIME 2 G: 2 INJECTION, POWDER, FOR SOLUTION INTRAVENOUS at 08:08

## 2023-08-19 RX ADMIN — MUPIROCIN 1 G: 20 OINTMENT TOPICAL at 10:08

## 2023-08-19 RX ADMIN — INSULIN DETEMIR 20 UNITS: 100 INJECTION, SOLUTION SUBCUTANEOUS at 08:08

## 2023-08-19 RX ADMIN — CEFEPIME 2 G: 2 INJECTION, POWDER, FOR SOLUTION INTRAVENOUS at 04:08

## 2023-08-19 RX ADMIN — ENOXAPARIN SODIUM 40 MG: 40 INJECTION SUBCUTANEOUS at 10:08

## 2023-08-19 RX ADMIN — INSULIN ASPART 4 UNITS: 100 INJECTION, SOLUTION INTRAVENOUS; SUBCUTANEOUS at 12:08

## 2023-08-19 RX ADMIN — CARVEDILOL 12.5 MG: 25 TABLET, FILM COATED ORAL at 10:08

## 2023-08-19 RX ADMIN — FERROUS SULFATE TAB 325 MG (65 MG ELEMENTAL FE) 1 EACH: 325 (65 FE) TAB at 10:08

## 2023-08-19 RX ADMIN — CEFEPIME 2 G: 2 INJECTION, POWDER, FOR SOLUTION INTRAVENOUS at 12:08

## 2023-08-19 RX ADMIN — CARVEDILOL 12.5 MG: 25 TABLET, FILM COATED ORAL at 05:08

## 2023-08-19 RX ADMIN — SITAGLIPTIN 100 MG: 100 TABLET, FILM COATED ORAL at 10:08

## 2023-08-19 RX ADMIN — HYDROCODONE BITARTRATE AND ACETAMINOPHEN 1 TABLET: 10; 325 TABLET ORAL at 04:08

## 2023-08-19 NOTE — ASSESSMENT & PLAN NOTE
Preliminary result with multi bacteria.    Will not change any antibiotic.    Continue to follow.

## 2023-08-19 NOTE — PROGRESS NOTES
Feeling well  Wounds changed today    VS ok  A+ox3, nad  Right hip/left hip wounds healing well and clean    Labs -   None today    57M s/p debridement of bilateral hips - overall well.  Plan for:  1) Followup cultures - currently growing GNR and also   Moderate Staphylococcus aureus Abnormal        Moderate GAMMA STREPTOCOCCUS Abnormal             Resulting Agency: [facility]   Patient is on vancomycin and cefepime.    2) Cont wound care and supportive care of at-risk areas (heels, etc)

## 2023-08-19 NOTE — SUBJECTIVE & OBJECTIVE
Interval History:  Resting.  No fever no chills.  No changes from before.    Review of Systems   Constitutional:  Positive for activity change and appetite change. Negative for fatigue and fever.   HENT: Negative.     Respiratory: Negative.     Cardiovascular: Negative.    Gastrointestinal:  Positive for abdominal distention.   Neurological:         Unchanged from before.   Hematological: Negative.    Psychiatric/Behavioral: Negative.     All other systems reviewed and are negative.    Objective:     Vital Signs (Most Recent):  Temp: 98.6 °F (37 °C) (08/19/23 0831)  Pulse: 70 (08/19/23 0831)  Resp: 16 (08/19/23 0721)  BP: 104/66 (08/19/23 0831)  SpO2: (!) 94 % (08/19/23 0831) Vital Signs (24h Range):  Temp:  [97.7 °F (36.5 °C)-98.6 °F (37 °C)] 98.6 °F (37 °C)  Pulse:  [70-91] 70  Resp:  [16-20] 16  SpO2:  [92 %-96 %] 94 %  BP: (104-143)/(66-91) 104/66     Weight: 106.4 kg (234 lb 9.6 oz)  Body mass index is 35.68 kg/m².    Intake/Output Summary (Last 24 hours) at 8/19/2023 1222  Last data filed at 8/19/2023 0632  Gross per 24 hour   Intake 907.21 ml   Output 1550 ml   Net -642.79 ml         Physical Exam  Vitals and nursing note reviewed.   Constitutional:       General: He is not in acute distress.     Appearance: He is obese. He is ill-appearing and diaphoretic. He is not toxic-appearing.   HENT:      Nose: Nose normal.      Mouth/Throat:      Mouth: Mucous membranes are moist.   Eyes:      Extraocular Movements: Extraocular movements intact.      Pupils: Pupils are equal, round, and reactive to light.   Cardiovascular:      Rate and Rhythm: Normal rate.      Heart sounds: No murmur heard.  Pulmonary:      Effort: Pulmonary effort is normal.   Abdominal:      General: There is distension.      Comments: Decreased abdominal sounds.   Musculoskeletal:      Cervical back: Normal range of motion. No rigidity.      Comments: Muscle wasting, contractures spasticity.   Lymphadenopathy:      Cervical: No cervical  adenopathy.   Skin:     Comments: Unchanged skin status.   Neurological:      Mental Status: He is alert.             Significant Labs: All pertinent labs within the past 24 hours have been reviewed.  Recent Lab Results  (Last 5 results in the past 24 hours)        08/19/23  0416   08/19/23  0048   08/18/23 2038   08/18/23 2021 08/18/23  1656        Albumin/Globulin Ratio 0.4       0.4         Albumin 1.7       1.8         Alkaline Phosphatase 97       106         ALT 5       6         AST 3       3         Baso #       0.03         Basophil %       0.5         BILIRUBIN TOTAL 0.2       0.2         BUN 16.0       16.0         Calcium 8.3       8.4         Chloride 106       106         CO2 23       23         Creatinine 0.85       0.84         eGFR >60       >60         Eos #       0.20         Eosinophil %       3.6         Globulin, Total 4.1       4.3         Glucose 230       289         Hematocrit       29.8         Hemoglobin       8.7         Immature Grans (Abs)       0.07         Immature Granulocytes       1.3         Lymph #       1.06         LYMPH %       19.3         MCH       24.6         MCHC       29.2         MCV       84.4         Mono #       0.35         Mono %       6.4         MPV       9.0         Neut #       3.79         Neut %       68.9         Platelets       324         POCT Glucose     277     255       Potassium 3.8       4.0         PROTEIN TOTAL 5.8       6.1         RBC       3.53         RDW       16.6         Sed Rate >140               Sodium 138       137         Vancomycin-Trough   37.7             WBC       5.50                                Significant Imaging:      TECHNIQUE:  20.0 mCi of Tc-99m MDP was administered intravenously.     COMPARISON:  None available     FINDINGS:  Immediate blood flow phase images reveal ill-defined mild increased activity at the left hip compared to the right     Delayed blood pool/soft tissue phase images reveal ill-defined mild diffuse  increased activity at the left hip and to a lesser extent the right hip     Delayed bone phase images reveal ill-defined increased activity at the right hip and to a lesser extent the left hip     Impression:     1. Diffuse increased soft tissue and bone activity at the hips bilaterally.  A underlying cellulitis and osteomyelitis must be considered.  MR examination would allow further evaluation if clinically indicated        Electronically signed by: Gabriel Hutchison  Date:                                            08/16/2023  Time:                                           16:22           Exam Ended: 08/16/23 15:38
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
## 2023-08-20 LAB
BACTERIA SPEC ANAEROBE CULT: ABNORMAL
BACTERIA SPEC ANAEROBE CULT: NORMAL
BACTERIA SPEC CULT: ABNORMAL
POCT GLUCOSE: 211 MG/DL (ref 70–110)
POCT GLUCOSE: 230 MG/DL (ref 70–110)
VANCOMYCIN SERPL-MCNC: 22 UG/ML (ref 15–20)

## 2023-08-20 PROCEDURE — 63600175 PHARM REV CODE 636 W HCPCS: Performed by: INTERNAL MEDICINE

## 2023-08-20 PROCEDURE — 80202 ASSAY OF VANCOMYCIN: CPT | Performed by: INTERNAL MEDICINE

## 2023-08-20 PROCEDURE — 27000207 HC ISOLATION

## 2023-08-20 PROCEDURE — 25000242 PHARM REV CODE 250 ALT 637 W/ HCPCS: Performed by: INTERNAL MEDICINE

## 2023-08-20 PROCEDURE — 21400001 HC TELEMETRY ROOM

## 2023-08-20 PROCEDURE — 25000003 PHARM REV CODE 250: Performed by: EMERGENCY MEDICINE

## 2023-08-20 PROCEDURE — 11000001 HC ACUTE MED/SURG PRIVATE ROOM

## 2023-08-20 PROCEDURE — 94761 N-INVAS EAR/PLS OXIMETRY MLT: CPT

## 2023-08-20 PROCEDURE — 25000003 PHARM REV CODE 250: Performed by: INTERNAL MEDICINE

## 2023-08-20 PROCEDURE — 94640 AIRWAY INHALATION TREATMENT: CPT

## 2023-08-20 RX ADMIN — CEFEPIME 2 G: 2 INJECTION, POWDER, FOR SOLUTION INTRAVENOUS at 04:08

## 2023-08-20 RX ADMIN — CARVEDILOL 12.5 MG: 25 TABLET, FILM COATED ORAL at 05:08

## 2023-08-20 RX ADMIN — BUDESONIDE 0.5 MG: 0.5 INHALANT RESPIRATORY (INHALATION) at 07:08

## 2023-08-20 RX ADMIN — INSULIN DETEMIR 20 UNITS: 100 INJECTION, SOLUTION SUBCUTANEOUS at 10:08

## 2023-08-20 RX ADMIN — CEFEPIME 2 G: 2 INJECTION, POWDER, FOR SOLUTION INTRAVENOUS at 12:08

## 2023-08-20 RX ADMIN — ENOXAPARIN SODIUM 40 MG: 40 INJECTION SUBCUTANEOUS at 10:08

## 2023-08-20 RX ADMIN — Medication 500 MG: at 08:08

## 2023-08-20 RX ADMIN — FERROUS SULFATE TAB 325 MG (65 MG ELEMENTAL FE) 1 EACH: 325 (65 FE) TAB at 08:08

## 2023-08-20 RX ADMIN — VANCOMYCIN HYDROCHLORIDE 1250 MG: 1.25 INJECTION, POWDER, LYOPHILIZED, FOR SOLUTION INTRAVENOUS at 03:08

## 2023-08-20 RX ADMIN — Medication 6 MG: at 11:08

## 2023-08-20 RX ADMIN — INSULIN ASPART 2 UNITS: 100 INJECTION, SOLUTION INTRAVENOUS; SUBCUTANEOUS at 10:08

## 2023-08-20 RX ADMIN — MULTIPLE VITAMINS W/ MINERALS TAB 1 TABLET: TAB at 08:08

## 2023-08-20 RX ADMIN — SENNOSIDES AND DOCUSATE SODIUM 2 TABLET: 50; 8.6 TABLET ORAL at 08:08

## 2023-08-20 RX ADMIN — ESCITALOPRAM 5 MG: 5 TABLET, FILM COATED ORAL at 08:08

## 2023-08-20 RX ADMIN — MUPIROCIN 1 G: 20 OINTMENT TOPICAL at 08:08

## 2023-08-20 RX ADMIN — ENOXAPARIN SODIUM 40 MG: 40 INJECTION SUBCUTANEOUS at 08:08

## 2023-08-20 RX ADMIN — INSULIN ASPART 4 UNITS: 100 INJECTION, SOLUTION INTRAVENOUS; SUBCUTANEOUS at 12:08

## 2023-08-20 RX ADMIN — SITAGLIPTIN 100 MG: 100 TABLET, FILM COATED ORAL at 08:08

## 2023-08-20 RX ADMIN — ZINC SULFATE 220 MG (50 MG) CAPSULE 220 MG: CAPSULE at 08:08

## 2023-08-20 RX ADMIN — CARVEDILOL 12.5 MG: 25 TABLET, FILM COATED ORAL at 08:08

## 2023-08-20 RX ADMIN — INSULIN ASPART 4 UNITS: 100 INJECTION, SOLUTION INTRAVENOUS; SUBCUTANEOUS at 05:08

## 2023-08-20 RX ADMIN — TRAMADOL HYDROCHLORIDE 50 MG: 50 TABLET, FILM COATED ORAL at 04:08

## 2023-08-20 RX ADMIN — CEFEPIME 2 G: 2 INJECTION, POWDER, FOR SOLUTION INTRAVENOUS at 10:08

## 2023-08-20 NOTE — SUBJECTIVE & OBJECTIVE
Interval History:  No new issues,  no fever no chills.    Review of Systems   Constitutional:  Positive for activity change and appetite change. Negative for fever.   HENT: Negative.     Respiratory: Negative.     Neurological:         Leg pain.     Objective:     Vital Signs (Most Recent):  Temp: 97.6 °F (36.4 °C) (08/20/23 0908)  Pulse: (!) 126 (08/20/23 0908)  Resp: 18 (08/20/23 0713)  BP: (!) 184/113 (08/20/23 0908)  SpO2: (!) 94 % (08/20/23 0908) Vital Signs (24h Range):  Temp:  [97.6 °F (36.4 °C)-98.2 °F (36.8 °C)] 97.6 °F (36.4 °C)  Pulse:  [] 126  Resp:  [18] 18  SpO2:  [92 %-98 %] 94 %  BP: (106-184)/() 184/113     Weight: 106.4 kg (234 lb 9.6 oz)  Body mass index is 35.68 kg/m².    Intake/Output Summary (Last 24 hours) at 8/20/2023 1221  Last data filed at 8/20/2023 0445  Gross per 24 hour   Intake 250 ml   Output 2225 ml   Net -1975 ml         Physical Exam  Vitals reviewed.   Constitutional:       General: He is in acute distress.      Appearance: He is obese. He is ill-appearing. He is not toxic-appearing.   HENT:      Head: Normocephalic.      Mouth/Throat:      Mouth: Mucous membranes are moist.   Cardiovascular:      Rate and Rhythm: Tachycardia present.   Pulmonary:      Effort: Pulmonary effort is normal.      Breath sounds: Rhonchi present.   Abdominal:      General: There is distension.      Comments: Hyperactive bowel sounds   Musculoskeletal:      Cervical back: Normal range of motion.      Right lower leg: Edema present.      Left lower leg: Edema present.      Comments: Pressure ulcers on the heels   Skin:     Capillary Refill: Capillary refill takes less than 2 seconds.   Neurological:      Mental Status: He is alert.      Comments: Paraplegia             Significant Labs: All pertinent labs within the past 24 hours have been reviewed.  Recent Lab Results         08/20/23  1144   08/20/23  0559   08/19/23  2054   08/19/23  1705        POCT Glucose 211     184   137        Vancomycin, Random   22.0                   Significant Imaging: I have reviewed all pertinent imaging results/findings within the past 24 hours.
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
## 2023-08-21 LAB
ALBUMIN SERPL-MCNC: 1.6 G/DL (ref 3.5–5)
ALBUMIN/GLOB SERPL: 0.4 RATIO (ref 1.1–2)
ALP SERPL-CCNC: 88 UNIT/L (ref 40–150)
ALT SERPL-CCNC: <5 UNIT/L (ref 0–55)
AST SERPL-CCNC: 7 UNIT/L (ref 5–34)
BACTERIA SPEC CULT: ABNORMAL
BACTERIA SPEC CULT: ABNORMAL
BASOPHILS # BLD AUTO: 0.04 X10(3)/MCL
BASOPHILS NFR BLD AUTO: 0.5 %
BILIRUB SERPL-MCNC: 0.2 MG/DL
BUN SERPL-MCNC: 20 MG/DL (ref 8.4–25.7)
CALCIUM SERPL-MCNC: 8.5 MG/DL (ref 8.4–10.2)
CHLORIDE SERPL-SCNC: 107 MMOL/L (ref 98–107)
CO2 SERPL-SCNC: 24 MMOL/L (ref 22–29)
CREAT SERPL-MCNC: 0.69 MG/DL (ref 0.73–1.18)
EOSINOPHIL # BLD AUTO: 0.33 X10(3)/MCL (ref 0–0.9)
EOSINOPHIL NFR BLD AUTO: 4.4 %
ERYTHROCYTE [DISTWIDTH] IN BLOOD BY AUTOMATED COUNT: 16.7 % (ref 11.5–17)
ERYTHROCYTE [SEDIMENTATION RATE] IN BLOOD: >140 MM/HR (ref 0–15)
GFR SERPLBLD CREATININE-BSD FMLA CKD-EPI: >60 MLS/MIN/1.73/M2
GLOBULIN SER-MCNC: 4.1 GM/DL (ref 2.4–3.5)
GLUCOSE SERPL-MCNC: 175 MG/DL (ref 74–100)
HCT VFR BLD AUTO: 28.4 % (ref 42–52)
HGB BLD-MCNC: 8.3 G/DL (ref 14–18)
IMM GRANULOCYTES # BLD AUTO: 0.08 X10(3)/MCL (ref 0–0.04)
IMM GRANULOCYTES NFR BLD AUTO: 1.1 %
LYMPHOCYTES # BLD AUTO: 1.75 X10(3)/MCL (ref 0.6–4.6)
LYMPHOCYTES NFR BLD AUTO: 23.5 %
MCH RBC QN AUTO: 24.9 PG (ref 27–31)
MCHC RBC AUTO-ENTMCNC: 29.2 G/DL (ref 33–36)
MCV RBC AUTO: 85 FL (ref 80–94)
MONOCYTES # BLD AUTO: 0.46 X10(3)/MCL (ref 0.1–1.3)
MONOCYTES NFR BLD AUTO: 6.2 %
NEUTROPHILS # BLD AUTO: 4.79 X10(3)/MCL (ref 2.1–9.2)
NEUTROPHILS NFR BLD AUTO: 64.3 %
PLATELET # BLD AUTO: 316 X10(3)/MCL (ref 130–400)
PMV BLD AUTO: 9.4 FL (ref 7.4–10.4)
POCT GLUCOSE: 141 MG/DL (ref 70–110)
POCT GLUCOSE: 154 MG/DL (ref 70–110)
POCT GLUCOSE: 210 MG/DL (ref 70–110)
POCT GLUCOSE: 268 MG/DL (ref 70–110)
POTASSIUM SERPL-SCNC: 4 MMOL/L (ref 3.5–5.1)
PROT SERPL-MCNC: 5.7 GM/DL (ref 6.4–8.3)
PSYCHE PATHOLOGY RESULT: NORMAL
RBC # BLD AUTO: 3.34 X10(6)/MCL (ref 4.7–6.1)
SODIUM SERPL-SCNC: 138 MMOL/L (ref 136–145)
WBC # SPEC AUTO: 7.45 X10(3)/MCL (ref 4.5–11.5)

## 2023-08-21 PROCEDURE — 85652 RBC SED RATE AUTOMATED: CPT | Performed by: EMERGENCY MEDICINE

## 2023-08-21 PROCEDURE — 25000242 PHARM REV CODE 250 ALT 637 W/ HCPCS: Performed by: INTERNAL MEDICINE

## 2023-08-21 PROCEDURE — 97110 THERAPEUTIC EXERCISES: CPT

## 2023-08-21 PROCEDURE — 85025 COMPLETE CBC W/AUTO DIFF WBC: CPT | Performed by: EMERGENCY MEDICINE

## 2023-08-21 PROCEDURE — 97161 PT EVAL LOW COMPLEX 20 MIN: CPT

## 2023-08-21 PROCEDURE — 21400001 HC TELEMETRY ROOM

## 2023-08-21 PROCEDURE — 80053 COMPREHEN METABOLIC PANEL: CPT | Performed by: EMERGENCY MEDICINE

## 2023-08-21 PROCEDURE — 25000003 PHARM REV CODE 250: Performed by: INTERNAL MEDICINE

## 2023-08-21 PROCEDURE — 27000207 HC ISOLATION

## 2023-08-21 PROCEDURE — 63600175 PHARM REV CODE 636 W HCPCS: Performed by: INTERNAL MEDICINE

## 2023-08-21 PROCEDURE — 94640 AIRWAY INHALATION TREATMENT: CPT

## 2023-08-21 PROCEDURE — 11000001 HC ACUTE MED/SURG PRIVATE ROOM

## 2023-08-21 PROCEDURE — 97530 THERAPEUTIC ACTIVITIES: CPT

## 2023-08-21 PROCEDURE — 94761 N-INVAS EAR/PLS OXIMETRY MLT: CPT

## 2023-08-21 RX ADMIN — FERROUS SULFATE TAB 325 MG (65 MG ELEMENTAL FE) 1 EACH: 325 (65 FE) TAB at 12:08

## 2023-08-21 RX ADMIN — ZINC SULFATE 220 MG (50 MG) CAPSULE 220 MG: CAPSULE at 12:08

## 2023-08-21 RX ADMIN — BUDESONIDE 0.5 MG: 0.5 INHALANT RESPIRATORY (INHALATION) at 07:08

## 2023-08-21 RX ADMIN — VANCOMYCIN HYDROCHLORIDE 1250 MG: 1.25 INJECTION, POWDER, LYOPHILIZED, FOR SOLUTION INTRAVENOUS at 12:08

## 2023-08-21 RX ADMIN — ESCITALOPRAM 5 MG: 5 TABLET, FILM COATED ORAL at 12:08

## 2023-08-21 RX ADMIN — CEFEPIME 2 G: 2 INJECTION, POWDER, FOR SOLUTION INTRAVENOUS at 09:08

## 2023-08-21 RX ADMIN — CEFEPIME 2 G: 2 INJECTION, POWDER, FOR SOLUTION INTRAVENOUS at 04:08

## 2023-08-21 RX ADMIN — Medication 500 MG: at 12:08

## 2023-08-21 RX ADMIN — MULTIPLE VITAMINS W/ MINERALS TAB 1 TABLET: TAB at 12:08

## 2023-08-21 RX ADMIN — ENOXAPARIN SODIUM 40 MG: 40 INJECTION SUBCUTANEOUS at 12:08

## 2023-08-21 RX ADMIN — ENOXAPARIN SODIUM 40 MG: 40 INJECTION SUBCUTANEOUS at 09:08

## 2023-08-21 RX ADMIN — CEFEPIME 2 G: 2 INJECTION, POWDER, FOR SOLUTION INTRAVENOUS at 01:08

## 2023-08-21 RX ADMIN — INSULIN DETEMIR 20 UNITS: 100 INJECTION, SOLUTION SUBCUTANEOUS at 09:08

## 2023-08-21 RX ADMIN — SITAGLIPTIN 100 MG: 100 TABLET, FILM COATED ORAL at 12:08

## 2023-08-21 RX ADMIN — TRAMADOL HYDROCHLORIDE 50 MG: 50 TABLET, FILM COATED ORAL at 09:08

## 2023-08-21 RX ADMIN — INSULIN ASPART 2 UNITS: 100 INJECTION, SOLUTION INTRAVENOUS; SUBCUTANEOUS at 06:08

## 2023-08-21 RX ADMIN — INSULIN ASPART 6 UNITS: 100 INJECTION, SOLUTION INTRAVENOUS; SUBCUTANEOUS at 05:08

## 2023-08-21 RX ADMIN — CARVEDILOL 12.5 MG: 25 TABLET, FILM COATED ORAL at 05:08

## 2023-08-21 RX ADMIN — CARVEDILOL 12.5 MG: 25 TABLET, FILM COATED ORAL at 09:08

## 2023-08-21 NOTE — PLAN OF CARE
08/21/23 0919   Discharge Assessment   Assessment Type Discharge Planning Assessment   Source of Information patient   People in Home parent(s)   Do you expect to return to your current living situation? No   Do you have help at home or someone to help you manage your care at home? Yes   Prior to hospitilization cognitive status: Alert/Oriented;No Deficits   Current cognitive status: Alert/Oriented;No Deficits   Walking or Climbing Stairs transferring difficulty, dependent;ambulation difficulty, dependent   Mobility Management quadraplegic   Dressing/Bathing bathing difficulty, requires equipment;dressing difficulty, dependent   Home Accessibility wheelchair accessible   Equipment Currently Used at Home bath bench;power chair;lift device   Do you currently have service(s) that help you manage your care at home? Yes   Name and Contact number of agency Patient has 24 hrs sitters   Is the pt/caregiver preference to resume services with current agency Yes   Do you take prescription medications? Yes   Do you have prescription coverage? Yes   Do you have any problems affording any of your prescribed medications? No   Is the patient taking medications as prescribed? yes   Who is going to help you get home at discharge? ambulance service   Are you on dialysis? No   Do you take coumadin? No   DME Needed Upon Discharge  none   Discharge Plan discussed with: Patient;Parent(s)   Transition of Care Barriers None   Discharge Plan A Long-term acute care facility (LTAC)   Discharge Plan B Home Health   Physical Activity   On average, how many days per week do you engage in moderate to strenuous exercise (like a brisk walk)? Patient refu   On average, how many minutes do you engage in exercise at this level? Patient refu   Financial Resource Strain   How hard is it for you to pay for the very basics like food, housing, medical care, and heating? Patient refu   Housing Stability   In the last 12 months, was there a time when you  were not able to pay the mortgage or rent on time? OR   In the last 12 months, was there a time when you did not have a steady place to sleep or slept in a shelter (including now)? OR   Transportation Needs   In the past 12 months, has lack of transportation kept you from medical appointments or from getting medications? Patient refu   In the past 12 months, has lack of transportation kept you from meetings, work, or from getting things needed for daily living? Patient refu   Food Insecurity   Within the past 12 months, you worried that your food would run out before you got the money to buy more. Patient refu   Within the past 12 months, the food you bought just didn't last and you didn't have money to get more. Patient refu   Stress   Do you feel stress - tense, restless, nervous, or anxious, or unable to sleep at night because your mind is troubled all the time - these days? Patient refu   Social Connections   In a typical week, how many times do you talk on the phone with family, friends, or neighbors? Patient refu   How often do you get together with friends or relatives? Patient refu   How often do you attend Tenriism or Advent services? Patient refu   Do you belong to any clubs or organizations such as Tenriism groups, unions, fraternal or athletic groups, or school groups? Patient refu   How often do you attend meetings of the clubs or organizations you belong to? Patient refu   Are you , , , , never , or living with a partner? Patient refu   Alcohol Use   Q1: How often do you have a drink containing alcohol? Patient refu   Q2: How many drinks containing alcohol do you have on a typical day when you are drinking? Patient refu   Q3: How often do you have six or more drinks on one occasion? Patient refu

## 2023-08-21 NOTE — PLAN OF CARE
Jennifer Fernando's office stated that they do not have another NP that is covered and no MD available, because the MD she works under is only a collaborating MD.  Contacted Wound Care clinic at [facility] in [place], and they do not have any MD this week on in their clinic.    Reached out to SHAWN Jalloh,  of Case Management, and she asked that I reach out to [facility], [facility] in [place], to [place] and see if she can assist with getting an NP to cover for the week.  Spoke to Ute and she will let me know if she can facilitate something.

## 2023-08-21 NOTE — SUBJECTIVE & OBJECTIVE
Interval History:     Review of Systems   Constitutional:  Positive for activity change.   HENT: Negative.     Eyes: Negative.    Respiratory: Negative.     Cardiovascular: Negative.    Gastrointestinal: Negative.    Endocrine: Negative.    Genitourinary: Negative.    Musculoskeletal:  Positive for gait problem.   Skin:  Positive for wound.   Allergic/Immunologic: Negative.    Neurological:  Positive for weakness.   Hematological: Negative.    Psychiatric/Behavioral: Negative.       Objective:     Vital Signs (Most Recent):  Temp: 97.5 °F (36.4 °C) (08/21/23 1136)  Pulse: 97 (08/21/23 1136)  Resp: 20 (08/21/23 0927)  BP: (!) 125/91 (08/21/23 1136)  SpO2: 95 % (08/21/23 1136) Vital Signs (24h Range):  Temp:  [97.5 °F (36.4 °C)-98 °F (36.7 °C)] 97.5 °F (36.4 °C)  Pulse:  [67-97] 97  Resp:  [20] 20  SpO2:  [94 %-100 %] 95 %  BP: (123-149)/(71-91) 125/91     Weight: 106.4 kg (234 lb 9.6 oz)  Body mass index is 35.68 kg/m².    Intake/Output Summary (Last 24 hours) at 8/21/2023 1448  Last data filed at 8/21/2023 0945  Gross per 24 hour   Intake 477 ml   Output 1450 ml   Net -973 ml         Physical Exam  Constitutional:       Appearance: Normal appearance. He is obese.   HENT:      Head: Normocephalic and atraumatic.      Nose: Nose normal.      Mouth/Throat:      Mouth: Mucous membranes are moist.      Pharynx: Oropharynx is clear.   Eyes:      Extraocular Movements: Extraocular movements intact.      Conjunctiva/sclera: Conjunctivae normal.      Pupils: Pupils are equal, round, and reactive to light.   Cardiovascular:      Rate and Rhythm: Normal rate and regular rhythm.      Pulses: Normal pulses.      Heart sounds: Normal heart sounds.   Pulmonary:      Effort: Pulmonary effort is normal.      Breath sounds: Normal breath sounds.   Abdominal:      General: Bowel sounds are normal.      Palpations: Abdomen is soft.   Musculoskeletal:         General: Normal range of motion.      Cervical back: Normal range of motion  "and neck supple.      Comments: quadraplegic   Skin:     General: Skin is warm and dry.      Capillary Refill: Capillary refill takes 2 to 3 seconds.      Findings: Lesion present.   Neurological:      General: No focal deficit present.      Mental Status: He is alert and oriented to person, place, and time. Mental status is at baseline.   Psychiatric:         Mood and Affect: Mood normal.         Behavior: Behavior normal.         Thought Content: Thought content normal.         Judgment: Judgment normal.             Significant Labs: All pertinent labs within the past 24 hours have been reviewed.  BMP:   Recent Labs   Lab 08/21/23  0401      K 4.0   CO2 24   BUN 20.0   CREATININE 0.69*   CALCIUM 8.5     CBC:   Recent Labs   Lab 08/21/23  0401   WBC 7.45   HGB 8.3*   HCT 28.4*        CMP:   Recent Labs   Lab 08/21/23  0401      K 4.0   CO2 24   BUN 20.0   CREATININE 0.69*   CALCIUM 8.5   ALBUMIN 1.6*   BILITOT 0.2   ALKPHOS 88   AST 7   ALT <5     Magnesium: No results for input(s): "MG" in the last 48 hours.    Significant Imaging: I have reviewed all pertinent imaging results/findings within the past 24 hours.  "

## 2023-08-21 NOTE — PLAN OF CARE
08/21/23 1125   Discharge Reassessment   Assessment Type Discharge Planning Reassessment   Discharge Plan discussed with: Patient;Parent(s)   Discharge Plan A Home Health   Discharge Plan B Home Health   DME Needed Upon Discharge  none   Transition of Care Barriers None   Why the patient remains in the hospital Requires continued medical care   Post-Acute Status   Post-Acute Authorization IV Infusion;Home Health   Home Health Status Referrals Sent   IV Infusion Status Referral(s) sent   Discharge Delays (!) Post-Acute Set-up     Met w/pt and discussed LTAC vs HH with home IVAB.  He wants to go home w/ HH and IVAB>  He is current w/ [facility] home health, orders sent.  Orders for IVAB sent to [facility].

## 2023-08-21 NOTE — PLAN OF CARE
Ginny emmanuel/ [facility] has to have an MD or NP to follow home IVAB, per policy.  She called Jennifer Fernando's office, patient's NP and she will be out of office this week, the staff did message her in regards to following home IVAB and are waiting for her to respond.  I did reach out to [facility]  but they do not have any NP's or MD's that are staffed to follow IVAB and are not partnered with any medical company that they could reach out to.  Ginny will reach out to Jennifer Fernando's office again.
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
## 2023-08-21 NOTE — PLAN OF CARE
Went to room to speak to patient and he was getting wound care done by staff.  Mother came out of room into the davalos and I told her that I am here to speak to patient about LTAC.  She stated that he can make his own decisions.  I told her that I will come back to speak to him.  She then began asking me questions about LTAC, which I explained.  She then tells me that patient works, and I told her that I will be back to speak to him and if he decides he does not want to go to LTAC, that we can arrange for him to go home on IVAB and a fly member will need to learn how to administer the medications.

## 2023-08-21 NOTE — PT/OT/SLP EVAL
"Physical Therapy Evaluation    Patient Name:  Antonio Galvan II   MRN:  [id number]    Recommendations:     Discharge Recommendations: home with home health   Discharge Equipment Recommendations: none   Barriers to discharge: None    Assessment:     Antonio Galvan II is a 57 y.o. male admitted with a medical diagnosis of Pressure injury of right ischium, stage 4.  He presents with the following impairments/functional limitations:   quadraplegia, contractures of multiple joints, multiple pressure ulcers, inability to self mobilize.    Rehab Prognosis: Poor; patient would benefit from acute skilled PT services to address these deficits and reach maximum level of function.    Recent Surgery: Procedure(s) (LRB):  INCISION AND DRAINAGE, HIP (Bilateral) 5 Days Post-Op    Plan:     During this hospitalization, patient to be seen   to address the identified rehab impairments via   and progress toward the following goals:    Plan of Care Expires:       Subjective     Chief Complaint: wanting to "get out of bed tomorrow if I don't pass out when I sit up"  Patient/Family Comments/goals: to prevent futher contractures/ulcers and rtn home  Pain/Comfort:       Patients cultural, spiritual, Evangelical conflicts given the current situation:      Living Environment:  Lives at home with his wife and his mother also cares for him as well as a caregiver.  Prior to admission, patients level of function was bed/chair bound.  Pt has reportedly been using slide board to get to WC with assist daily and rec daily ROM to Les and Ues.  Equipment used at home: other (see comments) (bath bench;power chair;lift device).  DME owned (not currently used): none.  Upon discharge, patient will have assistance from wife, mother, 24hr sitter.    Objective:     Communicated with pt, wife, mother prior to session.  Patient found HOB elevated with    upon PT entry to room.    General Precautions: Standard,    Orthopedic Precautions:    Braces:  "   Respiratory Status: Room air    Exams:  MMT:  0/5 Ues, Les (Quadraplegia)    Functional Mobility:  Bed Mobility:     Rolling Left:  dependence and of 2 persons  Rolling Right: dependence and of 2 persons  Scooting: dependence and of 2 persons      AM-PAC 6 CLICK MOBILITY  Total Score:        Treatment & Education:  Pt rec PROM BUEs and Les and posn to prevent contracture and further decubitus R partial sidelying.    Patient left HOB elevated with all lines intact.    GOALS:   Multidisciplinary Problems       Physical Therapy Goals          Problem: Physical Therapy    Goal Priority Disciplines Outcome Goal Variances Interventions   Physical Therapy Goal     PT, PT/OT Ongoing, Progressing     Description: 1.  Pt to receive PROM BLEs and Ues 5d/wk to prevent further contracutre  2.  Pt to be positioned to prevent further contracture or decubiti as appropriate  3.  Pt to be assessed for appropriateness for out of bed to WC                       History:     Past Medical History:   Diagnosis Date    A-fib     Cardiac arrest     7/2022    Chronic skin ulcer     DM (diabetes mellitus)     Infected decubitus ulcer     Lymphedema of leg     Neurogenic bladder     Obesity, unspecified     Pressure ulcer of heel     Pressure ulcer of right foot, stage 3     Pressure ulcer of right ischium     Quadriplegia     Quadriplegic spinal paralysis        Past Surgical History:   Procedure Laterality Date    APPLICATION OF WOUND VACUUM-ASSISTED CLOSURE DEVICE Right 5/12/2023    Procedure: APPLICATION, WOUND VAC;  Surgeon: Keyonna Jean MD;  Location: [facility] OR;  Service: General;  Laterality: Right;    DEBRIDEMENT OF BUTTOCKS Right 5/12/2023    Procedure: DEBRIDEMENT, BUTTOCK;  Surgeon: Keyonna Jean MD;  Location: [facility] OR;  Service: General;  Laterality: Right;    INCISION AND DRAINAGE HIP Bilateral 8/16/2023    Procedure: INCISION AND DRAINAGE, HIP;  Surgeon: Ryan Tirado MD;  Location: [facility] OR;  Service: General;   Laterality: Bilateral;  1. Excisional debridement skin to bone, skin to bone with biopsy and debridement of hard bone at the Left hip necrotic wound 11 cm long 5-1/2 cm wide 4-1/2 cm deep upon completion of debridement   2. Excisional debridement skin to muscle right hip with debridement       Time Tracking:     PT Received On: 08/21/23  PT Start Time: 1345     PT Stop Time: 1430  PT Total Time (min): 45 min     Billable Minutes: Evaluation 15, Therapeutic Activity 15, and Therapeutic Exercise 15      08/21/2023

## 2023-08-22 LAB
ALBUMIN SERPL-MCNC: 1.7 G/DL (ref 3.5–5)
ALBUMIN/GLOB SERPL: 0.4 RATIO (ref 1.1–2)
ALP SERPL-CCNC: 92 UNIT/L (ref 40–150)
ALT SERPL-CCNC: <5 UNIT/L (ref 0–55)
AST SERPL-CCNC: 5 UNIT/L (ref 5–34)
BASOPHILS # BLD AUTO: 0.05 X10(3)/MCL
BASOPHILS NFR BLD AUTO: 0.6 %
BILIRUB SERPL-MCNC: 0.2 MG/DL
BUN SERPL-MCNC: 19 MG/DL (ref 8.4–25.7)
CALCIUM SERPL-MCNC: 8.4 MG/DL (ref 8.4–10.2)
CHLORIDE SERPL-SCNC: 108 MMOL/L (ref 98–107)
CO2 SERPL-SCNC: 24 MMOL/L (ref 22–29)
CREAT SERPL-MCNC: 0.8 MG/DL (ref 0.73–1.18)
EOSINOPHIL # BLD AUTO: 0.34 X10(3)/MCL (ref 0–0.9)
EOSINOPHIL NFR BLD AUTO: 4.3 %
ERYTHROCYTE [DISTWIDTH] IN BLOOD BY AUTOMATED COUNT: 17.1 % (ref 11.5–17)
GFR SERPLBLD CREATININE-BSD FMLA CKD-EPI: >60 MLS/MIN/1.73/M2
GLOBULIN SER-MCNC: 4.4 GM/DL (ref 2.4–3.5)
GLUCOSE SERPL-MCNC: 176 MG/DL (ref 74–100)
HCT VFR BLD AUTO: 30.7 % (ref 42–52)
HGB BLD-MCNC: 8.9 G/DL (ref 14–18)
IMM GRANULOCYTES # BLD AUTO: 0.16 X10(3)/MCL (ref 0–0.04)
IMM GRANULOCYTES NFR BLD AUTO: 2 %
LYMPHOCYTES # BLD AUTO: 2.03 X10(3)/MCL (ref 0.6–4.6)
LYMPHOCYTES NFR BLD AUTO: 25.9 %
MAGNESIUM SERPL-MCNC: 2 MG/DL (ref 1.6–2.6)
MCH RBC QN AUTO: 24.9 PG (ref 27–31)
MCHC RBC AUTO-ENTMCNC: 29 G/DL (ref 33–36)
MCV RBC AUTO: 85.8 FL (ref 80–94)
MONOCYTES # BLD AUTO: 0.48 X10(3)/MCL (ref 0.1–1.3)
MONOCYTES NFR BLD AUTO: 6.1 %
NEUTROPHILS # BLD AUTO: 4.77 X10(3)/MCL (ref 2.1–9.2)
NEUTROPHILS NFR BLD AUTO: 61.1 %
PLATELET # BLD AUTO: 352 X10(3)/MCL (ref 130–400)
PMV BLD AUTO: 9.8 FL (ref 7.4–10.4)
POCT GLUCOSE: 149 MG/DL (ref 70–110)
POCT GLUCOSE: 182 MG/DL (ref 70–110)
POCT GLUCOSE: 237 MG/DL (ref 70–110)
POCT GLUCOSE: 279 MG/DL (ref 70–110)
POTASSIUM SERPL-SCNC: 4.1 MMOL/L (ref 3.5–5.1)
PROT SERPL-MCNC: 6.1 GM/DL (ref 6.4–8.3)
RBC # BLD AUTO: 3.58 X10(6)/MCL (ref 4.7–6.1)
SODIUM SERPL-SCNC: 140 MMOL/L (ref 136–145)
VANCOMYCIN SERPL-MCNC: 23.3 UG/ML (ref 15–20)
VANCOMYCIN TROUGH SERPL-MCNC: 22.2 UG/ML (ref 15–20)
VANCOMYCIN TROUGH SERPL-MCNC: 23.4 UG/ML (ref 15–20)
VANCOMYCIN TROUGH SERPL-MCNC: 29.2 UG/ML (ref 15–20)
WBC # SPEC AUTO: 7.83 X10(3)/MCL (ref 4.5–11.5)

## 2023-08-22 PROCEDURE — 94640 AIRWAY INHALATION TREATMENT: CPT

## 2023-08-22 PROCEDURE — 99900035 HC TECH TIME PER 15 MIN (STAT)

## 2023-08-22 PROCEDURE — 25000003 PHARM REV CODE 250: Performed by: INTERNAL MEDICINE

## 2023-08-22 PROCEDURE — 80202 ASSAY OF VANCOMYCIN: CPT | Performed by: INTERNAL MEDICINE

## 2023-08-22 PROCEDURE — 80202 ASSAY OF VANCOMYCIN: CPT | Performed by: STUDENT IN AN ORGANIZED HEALTH CARE EDUCATION/TRAINING PROGRAM

## 2023-08-22 PROCEDURE — 94761 N-INVAS EAR/PLS OXIMETRY MLT: CPT

## 2023-08-22 PROCEDURE — 83735 ASSAY OF MAGNESIUM: CPT | Performed by: INTERNAL MEDICINE

## 2023-08-22 PROCEDURE — 25000242 PHARM REV CODE 250 ALT 637 W/ HCPCS: Performed by: INTERNAL MEDICINE

## 2023-08-22 PROCEDURE — 85025 COMPLETE CBC W/AUTO DIFF WBC: CPT | Performed by: INTERNAL MEDICINE

## 2023-08-22 PROCEDURE — 27000207 HC ISOLATION

## 2023-08-22 PROCEDURE — 97530 THERAPEUTIC ACTIVITIES: CPT

## 2023-08-22 PROCEDURE — 25000003 PHARM REV CODE 250: Performed by: EMERGENCY MEDICINE

## 2023-08-22 PROCEDURE — 63600175 PHARM REV CODE 636 W HCPCS: Performed by: INTERNAL MEDICINE

## 2023-08-22 PROCEDURE — 21400001 HC TELEMETRY ROOM

## 2023-08-22 PROCEDURE — 80053 COMPREHEN METABOLIC PANEL: CPT | Performed by: INTERNAL MEDICINE

## 2023-08-22 RX ORDER — ZOLPIDEM TARTRATE 5 MG/1
5 TABLET ORAL NIGHTLY PRN
Status: DISCONTINUED | OUTPATIENT
Start: 2023-08-22 | End: 2023-08-23 | Stop reason: HOSPADM

## 2023-08-22 RX ADMIN — ZOLPIDEM TARTRATE 5 MG: 5 TABLET ORAL at 11:08

## 2023-08-22 RX ADMIN — ENOXAPARIN SODIUM 40 MG: 40 INJECTION SUBCUTANEOUS at 10:08

## 2023-08-22 RX ADMIN — INSULIN ASPART 4 UNITS: 100 INJECTION, SOLUTION INTRAVENOUS; SUBCUTANEOUS at 05:08

## 2023-08-22 RX ADMIN — CEFEPIME 2 G: 2 INJECTION, POWDER, FOR SOLUTION INTRAVENOUS at 02:08

## 2023-08-22 RX ADMIN — ZINC SULFATE 220 MG (50 MG) CAPSULE 220 MG: CAPSULE at 10:08

## 2023-08-22 RX ADMIN — SODIUM CHLORIDE: 9 INJECTION, SOLUTION INTRAVENOUS at 09:08

## 2023-08-22 RX ADMIN — INSULIN ASPART 2 UNITS: 100 INJECTION, SOLUTION INTRAVENOUS; SUBCUTANEOUS at 06:08

## 2023-08-22 RX ADMIN — CARVEDILOL 12.5 MG: 25 TABLET, FILM COATED ORAL at 10:08

## 2023-08-22 RX ADMIN — HYDROCODONE BITARTRATE AND ACETAMINOPHEN 1 TABLET: 10; 325 TABLET ORAL at 09:08

## 2023-08-22 RX ADMIN — Medication 500 MG: at 10:08

## 2023-08-22 RX ADMIN — CARVEDILOL 12.5 MG: 25 TABLET, FILM COATED ORAL at 05:08

## 2023-08-22 RX ADMIN — CEFEPIME 2 G: 2 INJECTION, POWDER, FOR SOLUTION INTRAVENOUS at 04:08

## 2023-08-22 RX ADMIN — CEFEPIME 2 G: 2 INJECTION, POWDER, FOR SOLUTION INTRAVENOUS at 09:08

## 2023-08-22 RX ADMIN — HYDROCODONE BITARTRATE AND ACETAMINOPHEN 1 TABLET: 10; 325 TABLET ORAL at 04:08

## 2023-08-22 RX ADMIN — SITAGLIPTIN 100 MG: 100 TABLET, FILM COATED ORAL at 10:08

## 2023-08-22 RX ADMIN — FERROUS SULFATE TAB 325 MG (65 MG ELEMENTAL FE) 1 EACH: 325 (65 FE) TAB at 10:08

## 2023-08-22 RX ADMIN — Medication 6 MG: at 09:08

## 2023-08-22 RX ADMIN — INSULIN DETEMIR 20 UNITS: 100 INJECTION, SOLUTION SUBCUTANEOUS at 09:08

## 2023-08-22 RX ADMIN — MULTIPLE VITAMINS W/ MINERALS TAB 1 TABLET: TAB at 10:08

## 2023-08-22 RX ADMIN — ENOXAPARIN SODIUM 40 MG: 40 INJECTION SUBCUTANEOUS at 09:08

## 2023-08-22 RX ADMIN — INSULIN ASPART 6 UNITS: 100 INJECTION, SOLUTION INTRAVENOUS; SUBCUTANEOUS at 12:08

## 2023-08-22 RX ADMIN — BUDESONIDE 0.5 MG: 0.5 INHALANT RESPIRATORY (INHALATION) at 07:08

## 2023-08-22 RX ADMIN — ESCITALOPRAM 5 MG: 5 TABLET, FILM COATED ORAL at 10:08

## 2023-08-22 NOTE — PLAN OF CARE
Problem: Adult Inpatient Plan of Care  Goal: Plan of Care Review  Outcome: Ongoing, Progressing  Goal: Patient-Specific Goal (Individualized)  Outcome: Ongoing, Progressing  Goal: Absence of Hospital-Acquired Illness or Injury  Outcome: Ongoing, Progressing  Goal: Optimal Comfort and Wellbeing  Outcome: Ongoing, Progressing  Goal: Readiness for Transition of Care  Outcome: Ongoing, Progressing     Problem: Infection  Goal: Absence of Infection Signs and Symptoms  Outcome: Ongoing, Progressing     Problem: Adjustment to Illness (Sepsis/Septic Shock)  Goal: Optimal Coping  Outcome: Ongoing, Progressing     Problem: Bleeding (Sepsis/Septic Shock)  Goal: Absence of Bleeding  Outcome: Ongoing, Progressing     Problem: Glycemic Control Impaired (Sepsis/Septic Shock)  Goal: Blood Glucose Level Within Desired Range  Outcome: Ongoing, Progressing     Problem: Infection Progression (Sepsis/Septic Shock)  Goal: Absence of Infection Signs and Symptoms  Outcome: Ongoing, Progressing     Problem: Nutrition Impaired (Sepsis/Septic Shock)  Goal: Optimal Nutrition Intake  Outcome: Ongoing, Progressing     Problem: Diabetes Comorbidity  Goal: Blood Glucose Level Within Targeted Range  Outcome: Ongoing, Progressing     Problem: Impaired Wound Healing  Goal: Optimal Wound Healing  Outcome: Ongoing, Progressing     Problem: Skin Injury Risk Increased  Goal: Skin Health and Integrity  Outcome: Ongoing, Progressing     Problem: Anemia  Goal: Anemia Symptom Improvement  Outcome: Ongoing, Progressing

## 2023-08-22 NOTE — PLAN OF CARE
[place] emmanuel/ [facility] was unable to assist with an MD to follow IV Vanc at home.  Spoke to patient's sister here today, and she stated that wound care MD< Dr. Nandini Jean did follow antibiotics at home previously when pt was on them at home from [facility].  Sister contacted Dr. Delatorre wound care center and they were able to speak to him and he agrees to follow for [facility].  Notified Ginny w/[facility].  She is waiting on vanc level from today and dosing collaboration with the pharmacist and will get back to me on the exact order she needs for the IV Vanc at home, before we can d/c pt.  Informed pt that he will be discharged today.  Waiting on hospitalists to arrive.

## 2023-08-22 NOTE — NURSING
Per Tammie Oconnell, pt can have a lipid panel with reflex, and a CMP or fasting glucose, which ever she prefers.    Left a voice message   Pt refused to turn on Q2 schedule throughout night per PCT Melany. She reports that pt would want to turn a few hours later.
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
## 2023-08-22 NOTE — PLAN OF CARE
Patient has decided he does not want to go home, he now wants to go to [facility]>  Notified [place] and they can take him today.  Notified Dr. Rogers.   Notified [facility].

## 2023-08-22 NOTE — PHYSICIAN QUERY
PT Name: Antonio Galvan II  MR #: [id number]    DOCUMENTATION CLARIFICATION     CDS/: Rody Isaacs               Contact information:     This form is a permanent document in the medical record.     Query Date: August 22, 2023    By submitting this query, we are merely seeking further clarification of documentation.. Please utilize your independent clinical judgment when addressing the question(s) below.    The medical record contains the following:   Indicators  Supporting Clinical Findings Location in Medical Record   x Registered Dietician Diagnosis 8/16 Nutrition Consult  Malnutrition in the context of acute illness or injury  Degree of Malnutrition: non-severe (moderate) malnutrition   8/16/23 Nutrition Consult   x Energy Intake Energy Intake: does not meet criteria 8/16/23 Nutrition Consult   x Weight Loss Interpretation of Weight Loss: does not meet criteria 8/16/23 Nutrition Consult   x Fat Loss Body Fat:mild depletion  Area of Body Fat Loss: orbital region  8/16/23 Nutrition Consult   x Muscle Loss Muscle Mass Loss: mild depletion  Area of Muscle Mass Loss: temple region - temporalis muscle, clavicle bone region - pectoralis major, deltoid, trapezius muscles, and clavicle and acromion bone region - deltoid muscle 8/16/23 Nutrition Consult   x Edema/Fluid Accumulation Fluid Accumulation: unable to obtain  Edema: unable to obtain  8/16/23 Nutrition Consult   x Reduced  Strength (by dynamometer) Reduced  Strength: unable to obtain 8/16/23 Nutrition Consult   x Weight, BMI, Usual Body Weight BMI (Calculated): 35.7 8/16/23 Nutrition Consult    Delayed Wound Healing     x Acute or Chronic Illness 8/16 H&P  Left anterior foot eschar unstageable ulcer POA  Left posterior heel eschar unstageable ulcer POA  Large right posterior hip gluteal ulcer stage IV POA  Stage II sacral decubitus ulcer POA  Left lateral hip stage IV ulcer POA  Multiple sites of infected wound  Orthostatic hypotension verses  iatrogenic hypotension versus infected wound  Normocytic anemia/anemia of chronic disease    8/17 HM PN  Suspected bilateral hip osteomyelitis 8/16/23 H&P                    8/17/23 Progress note    Social or Environmental Circumstances     x Treatment Rec'd Diabetic Diet with double protein when able to lift NPO status.   Add Cole, BID, to assist with healing.   Add 500 mg Vitamin C + 200 mg Zinc Sulfate daily to aid in wound healing.   Add Boost Glucose control TID (Chocolate or Strawberry - Rotate Flavors). Provides 190 kcal, 16 g protein per serving.   If protein intake is not sufficient, will add ProSource NoCarb Liquid protein - Pt agrees.  Monitor NPO status, weight, and labs.  8/16/23 Nutrition Consult    Other       Academy of Nutrition and Dietetics (Academy) and the American Society for Parenteral and Enteral Nutrition (A.S.P.E.N.) Clinical Characteristics to support Malnutrition      Criteria for mild malnutrition is defined as 1 characteristic outlined above within the established moderate or severe parameters.  A minimum of 2 out of the 6 characteristics noted above are recommended for a diagnosis of moderate or severe malnutrition.  Chronic illness/injury is a disease/condition lasting 3 months or longer.    The noted clinical guidelines are only system guidelines and do not replace the providers clinical judgment.    Provider, please specify diagnosis or diagnoses associated with above clinical findings.    [  x] Moderate Malnutrition - a minimum of 2 of the 6 moderate malnutrition characteristics noted above    [  ] Other Nutritional Diagnosis (please specify): _______       Please document in your progress notes daily for the duration of treatment until resolved and  include in your discharge summary.      References:    JIMMY Woodard, & MALINDA Guerra. (2022, April). Assessment and management of anorexia and cachexia in palliative care. Retrieved May 23, 2022, from  [email or web address]-management-[email or web address]-[email or web address]-care?[email or web address]     SHRAVAN Cooper, PhD, RD, Debbie NORIEGA P., PhD, RN, FELTON Hernandez MD, PhD, Genna BENDER A., MS, RD, [facility], SHAWN Jon, MS, RD, The Academy Malnutrition Work Group, The A.S.P.E.N. Board of Directors. (2012). Consensus Statement: Academy of Nutrition and Dietetics and American Society for Parenteral and Enteral Nutrition: Characteristics Recommended for the Identification and Documentation of Adult Malnutrition (Undernutrition). Journal of Parenteral and Enteral Nutrition, 36(3), 275-283. doi:10.1177/2984777605220936     Form No. 68477

## 2023-08-22 NOTE — PROGRESS NOTES
Inpatient Nutrition Assessment    Admit Date: 8/16/2023   Total duration of encounter: 6 days     Nutrition Recommendation/Prescription     Rec'd continue Diabetic Diet with double protein on meal trays.   Continue Cole, mixed with Diet Sprite, BID, to assist with healing.   Add 500 mg Vitamin C + 200 mg Zinc Sulfate daily to aid in wound healing.   Continue Boost Glucose control TID (Chocolate or Strawberry - Rotate Flavors). Provides 190 kcal, 16 g protein per serving.   Monitor intake, weight, and labs.     RD following and available as needed. Thank you.     Communication of Recommendations: reviewed with nurse, reviewed with patient, reviewed with caregiver, reviewed with family, and EMR.     Nutrition Assessment     Malnutrition Assessment/Nutrition-Focused Physical Exam    Malnutrition in the context of acute illness or injury  Degree of Malnutrition: non-severe (moderate) malnutrition  Energy Intake: does not meet criteria  Interpretation of Weight Loss: does not meet criteria  Body Fat:mild depletion  Area of Body Fat Loss: orbital region   Muscle Mass Loss: mild depletion  Area of Muscle Mass Loss: temple region - temporalis muscle, clavicle bone region - pectoralis major, deltoid, trapezius muscles, and clavicle and acromion bone region - deltoid muscle  Fluid Accumulation: unable to obtain  Edema: unable to obtain   Reduced  Strength: unable to obtain  A minimum of two characteristics is recommended for diagnosis of either severe or non-severe malnutrition.    Chart Review    Reason Seen: follow-up    Malnutrition Screening Tool Results   Have you recently lost weight without trying?: Yes: Unsure how much  Have you been eating poorly because of a decreased appetite?: Yes   MST Score: 3     Diagnosis:  Left anterior foot eschar unstageable ulcer POA  Left posterior heel eschar unstageable ulcer POA  Large right posterior hip gluteal ulcer stage IV POA  Stage II sacral decubitus ulcer POA  Left  lateral hip stage IV ulcer POA  Multiple sites of infected wound  Orthostatic hypotension verses iatrogenic hypotension versus infected wound  Normocytic anemia/anemia of chronic disease         Pressure injury of right ischium, stage 4  L89.314 707.05       707.24   2. 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, stage 4, unspecified laterality  L89.44 707.09       707.24   3. Pressure injury of trochanteric region of left hip, stage 4  L89.224 707.04       707.24   4. Pressure injury of right heel, stage 3  L89.613 707.07       707.23   5. Pressure injury of left ankle, unstageable  L89.520 707.06       707.25   6. Acute osteomyelitis          Relevant Medical History:   A-fib      Cardiac arrest  7/2022    Chronic skin ulcer      DM (diabetes mellitus)      Infected decubitus ulcer      Lymphedema of leg      Neurogenic bladder      Obesity, unspecified      Pressure ulcer of heel      Pressure ulcer of right foot, stage 3      Pressure ulcer of right ischium      Quadriplegia      Quadriplegic spinal paralysis          Nutrition-Related Medications:   Sitagliptin phosphate; Senna-docusate; Ferrous Sulfate; Lovenox; Insulin.      Calorie Containing IV Medications: no significant kcals from medications at this time    Nutrition-Related Labs:  8/22: H/H 8.9/30.7(L); Cl 108(H); (H); Alb 1.7(L).   8/16: H/H 7.0/23.8(L); (H); Ca+ 8.2(L)    Diet/PN Order: Diet diabetic Double Protein  Oral Supplement Order: Boost Glucose Control  Tube Feeding Order: none  Appetite/Oral Intake: fair/50-75% of meals  Factors Affecting Nutritional Intake: NPO  Food/[employer]/Cultural Preferences:  Likes Strawberry and Chocolate Flavors.   Food Allergies: none reported    Skin Integrity: incision, wound  Wound(s):      Altered Skin Integrity 01/12/23 1532 Right posterior Thigh Full thickness tissue loss with exposed bone, tendon, or muscle. Often includes undermining and tunneling. May extend into muscle  "and/or supporting structures.-Tissue loss description: Full thickness     Comments  8/22: Pt consumed 50% so far today. Nsg reports pt was supposed to be d/c'd back home today but decided he wanted to go to LTAC. Nsg reports LTAC accepted pt and pt will likely be d/c'd to LTAC today. Labs reviewed. No new weight. Will continue to monitor during stay.       8/18: Pt reports good appetite and intake. Consuming 50 - 75% of meals so far. S/P debridement. Emphasized the importance of Cole to assist with optimal healing. Pt is receiving Cole on meal trays; however, kitchen mixes Cole and pt did not know what the "orange drink was" did not drink. Offered to send packet on tray with Diet Sprite and pt or family will mix. Pt prefers to mix Cole. Notified kitchen.  BP have been good. Labs and meds reviewed. No new weights. Awaiting results of cultures. Will continue to monitor during stay.     8/16: Pt states his appetite is good; however, reports weight loss. States UBW was about 270# 3 to 6 months ago. Family at bedside confirm weight loss. Noted multiple wounds. Goes to wound care. Pt has hx of anemia. States he is Diabetic but does not follow a Diabetic Diet at home. Last HgbA1C (6.9) on 5/8. Emphasized the importance of not skipping meals, following a Diabetic Diet, as well as, consuming adequate protein, Vitamin C, Zinc, and Vitamin A to assist with wound healing. Pt agreed to Cole and Boost Glucose Control - Would like Strawberry or Chocolate - Rotate flavors. States he drinks protein shakes at home. Pt states he is hungry and has not eaten since ~ 8:00 last night. Discussed recommendations with nsg and nsg reports pt is to remain NPO for surgery evaluation. Will continue to monitor during stay.     Anthropometrics    Height: 5' 7.99" (172.7 cm) Height Method: Stated  Last Weight: 106.4 kg (234 lb 9.6 oz) (08/16/23 0045) Weight Method: Bed Scale  BMI (Calculated): 35.7  BMI Classification: obese grade II (BMI " 35-39.9)        Ideal Body Weight (IBW), Male: 153.94 lb     % Ideal Body Weight, Male (lb): 152.34 %                 Usual Body Weight (UBW), k kg  % Usual Body Weight: 86.7     Usual Weight Provided By: patient    Wt Readings from Last 5 Encounters:   23 106.4 kg (234 lb 9.6 oz)   08/15/23 113.4 kg (250 lb)   23 108.9 kg (240 lb)   23 100.7 kg (222 lb 0.1 oz)   23 100.7 kg (222 lb 0.1 oz)     Weight Change(s) Since Admission:  Admit Weight: 106.4 kg (234 lb 9.6 oz) (23 0045)  #  8/18: No new weights.     Estimated Needs    Weight Used For Calorie Calculations: 106 kg (233 lb 11 oz)  Energy Calorie Requirements (kcal): 2100 to 2200 kcal/day (20 to 22kcals/kg/CBW)  Energy Need Method: Kcal/kg  Weight Used For Protein Calculations: 70 kg (154 lb 5.2 oz) (IBW used to estimate protein needs.)  Protein Requirements: 105 to 140 g/day (1.5 to 2.0 g/kg/IBW)  Fluid Requirements (mL): 2200 mL/day (1mL/kcal)  Temp (24hrs), Av °F (36.7 °C), Min:97.7 °F (36.5 °C), Max:98.4 °F (36.9 °C)       Enteral Nutrition    Patient not receiving enteral nutrition at this time.    Parenteral Nutrition    Patient not receiving parenteral nutrition support at this time.    Evaluation of Received Nutrient Intake    Calories: meeting estimated needs  Protein: meeting estimated needs    Patient Education    Not applicable.    Nutrition Diagnosis     PES: Increased nutrient needs related to increased protein energy demand for wound healing as evidenced by Pt with multiple wounds. (continues)    Interventions/Goals     Intervention(s): modified composition of meals/snacks, commercial beverage, multivitamin/mineral supplement therapy, and collaboration with other providers  Goal: Meet greater than 75% of nutritional needs by follow-up. (goal met)    Monitoring & Evaluation     Dietitian will monitor energy intake, weight, weight change, electrolyte/renal panel, glucose/endocrine profile, and  gastrointestinal profile.  Nutrition Risk/Follow-Up: high (follow-up in 1-4 days)   Please consult if re-assessment needed sooner.

## 2023-08-22 NOTE — PLAN OF CARE
[facility] is requiring a Vancomycin Trough level prior to 2pm dose, so they can dose for outpatient infusion.  Order placed for 1:30PM.
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
## 2023-08-22 NOTE — SUBJECTIVE & OBJECTIVE
Interval History: see above     Review of Systems   Constitutional:  Positive for activity change.   HENT: Negative.     Eyes: Negative.    Respiratory: Negative.     Cardiovascular: Negative.    Gastrointestinal: Negative.    Endocrine: Negative.    Genitourinary: Negative.    Musculoskeletal:  Positive for gait problem.   Skin:  Positive for wound.   Allergic/Immunologic: Negative.    Neurological:  Positive for weakness.   Hematological: Negative.    Psychiatric/Behavioral: Negative.       Objective:     Vital Signs (Most Recent):  Temp: 97.7 °F (36.5 °C) (08/22/23 1154)  Pulse: (!) 130 (08/22/23 1154)  Resp: 20 (08/22/23 1154)  BP: 91/65 (08/22/23 1154)  SpO2: 97 % (08/22/23 1154) Vital Signs (24h Range):  Temp:  [97.7 °F (36.5 °C)-98.4 °F (36.9 °C)] 97.7 °F (36.5 °C)  Pulse:  [] 130  Resp:  [18-20] 20  SpO2:  [96 %-99 %] 97 %  BP: ()/(65-99) 91/65     Weight: 106.4 kg (234 lb 9.6 oz)  Body mass index is 35.68 kg/m².    Intake/Output Summary (Last 24 hours) at 8/22/2023 1544  Last data filed at 8/22/2023 0800  Gross per 24 hour   Intake 440 ml   Output 750 ml   Net -310 ml         Physical Exam  Constitutional:       Appearance: Normal appearance. He is obese.   HENT:      Head: Normocephalic and atraumatic.      Nose: Nose normal.      Mouth/Throat:      Mouth: Mucous membranes are moist.      Pharynx: Oropharynx is clear.   Eyes:      Extraocular Movements: Extraocular movements intact.      Conjunctiva/sclera: Conjunctivae normal.      Pupils: Pupils are equal, round, and reactive to light.   Cardiovascular:      Rate and Rhythm: Normal rate and regular rhythm.      Pulses: Normal pulses.      Heart sounds: Normal heart sounds.   Pulmonary:      Effort: Pulmonary effort is normal.      Breath sounds: Normal breath sounds.   Abdominal:      General: Bowel sounds are normal.      Palpations: Abdomen is soft.   Musculoskeletal:         General: Normal range of motion.      Cervical back: Normal range  of motion and neck supple.      Comments: quadraplegic   Skin:     General: Skin is warm and dry.      Capillary Refill: Capillary refill takes 2 to 3 seconds.      Findings: Lesion present.   Neurological:      General: No focal deficit present.      Mental Status: He is alert and oriented to person, place, and time. Mental status is at baseline.   Psychiatric:         Mood and Affect: Mood normal.         Behavior: Behavior normal.         Thought Content: Thought content normal.         Judgment: Judgment normal.             Significant Labs: All pertinent labs within the past 24 hours have been reviewed.    Significant Imaging: I have reviewed all pertinent imaging results/findings within the past 24 hours.

## 2023-08-22 NOTE — PT/OT/SLP PROGRESS
Physical Therapy Treatment    Patient Name:  Antonio Galvan II   MRN:  [id number]    Recommendations:     Discharge Recommendations: home with home health  Discharge Equipment Recommendations: none  Barriers to discharge: None    Assessment:     Antonio Galvan II is a 57 y.o. male admitted with a medical diagnosis of Osteomyelitis.  He presents with the following impairments/functional limitations:   difficulty with tfs out of bed, syncope.    Rehab Prognosis: Poor; patient would benefit from acute skilled PT services to address these deficits and reach maximum level of function.    Recent Surgery: Procedure(s) (LRB):  INCISION AND DRAINAGE, HIP (Bilateral) 6 Days Post-Op    Plan:     During this hospitalization, patient to be seen   to address the identified rehab impairments via   and progress toward the following goals:    Plan of Care Expires:       Subjective     Chief Complaint: Pt wants to get out of bed to his power chair, but is afraid to faint or have syncopal episode once upright, asks for PT assist to safely get to power chair  Patient/Family Comments/goals: to get to power chair  Pain/Comfort:         Objective:     Communicated with pt, caregiver, wife, nurse prior to session.  Patient found HOB elevated with   upon PT entry to room.     General Precautions: Standard,    Orthopedic Precautions:    Braces:    Respiratory Status: Room air     Functional Mobility:  Bed Mobility:     Rolling Left:  total assistance and of 2 persons  Rolling Right: total assistance  Scooting: total assistance and of 2 persons  Transfers:     Bed to Chair: dependence and of 2 persons with  tess lift  using  sling to place pt safely in power chair      AM-PAC 6 CLICK MOBILITY          Treatment & Education:  Patient was positioned in power chair on donut cushion to offload sacral decubitus with no pressure on hips to offload hip wound and with heels floated to protect heel wounds    Patient left up in chair with all  lines intact and caregivers present..    GOALS:   Multidisciplinary Problems       Physical Therapy Goals          Problem: Physical Therapy    Goal Priority Disciplines Outcome Goal Variances Interventions   Physical Therapy Goal     PT, PT/OT Ongoing, Progressing     Description: 1.  Pt to receive PROM BLEs and Ues 5d/wk to prevent further contracutre  2.  Pt to be positioned to prevent further contracture or decubiti as appropriate  3.  Pt to be assisted via tess vs slide board as deemed appropriate per therapist for out of bed to WC, 2 helpers for safety  4.  Pt to remain OOB in power chair 2hrs daily 5-6 days weekly                       Time Tracking:     PT Received On: 08/22/23  PT Start Time: 1100     PT Stop Time: 1145  PT Total Time (min): 45 min     Billable Minutes: Therapeutic Activity 45    Treatment Type: Treatment  PT/PTA: PT           08/22/2023

## 2023-08-23 ENCOUNTER — HOSPITAL ENCOUNTER (INPATIENT)
Facility: HOSPITAL | Age: 58
LOS: 35 days | Discharge: HOME HEALTH | DRG: 592 | End: 2023-09-27
Attending: STUDENT IN AN ORGANIZED HEALTH CARE EDUCATION/TRAINING PROGRAM | Admitting: STUDENT IN AN ORGANIZED HEALTH CARE EDUCATION/TRAINING PROGRAM
Payer: MEDICARE

## 2023-08-23 VITALS
WEIGHT: 234.63 LBS | OXYGEN SATURATION: 97 % | RESPIRATION RATE: 20 BRPM | HEIGHT: 68 IN | HEART RATE: 77 BPM | DIASTOLIC BLOOD PRESSURE: 58 MMHG | SYSTOLIC BLOOD PRESSURE: 94 MMHG | TEMPERATURE: 98 F | BODY MASS INDEX: 35.56 KG/M2

## 2023-08-23 DIAGNOSIS — R07.9 CHEST PAIN: ICD-10-CM

## 2023-08-23 DIAGNOSIS — D64.9 SYMPTOMATIC ANEMIA: ICD-10-CM

## 2023-08-23 DIAGNOSIS — L98.429 SACRAL ULCER: ICD-10-CM

## 2023-08-23 DIAGNOSIS — L89.314 PRESSURE INJURY OF RIGHT ISCHIUM, STAGE 4: Primary | Chronic | ICD-10-CM

## 2023-08-23 DIAGNOSIS — S71.002A OPEN WOUND OF LEFT HIP, INITIAL ENCOUNTER: ICD-10-CM

## 2023-08-23 LAB
APPEARANCE UR: ABNORMAL
BACTERIA #/AREA URNS AUTO: ABNORMAL /HPF
BILIRUB UR QL STRIP.AUTO: NEGATIVE
COLOR UR: YELLOW
GLUCOSE UR QL STRIP.AUTO: 100
KETONES UR QL STRIP.AUTO: ABNORMAL
LEUKOCYTE ESTERASE UR QL STRIP.AUTO: ABNORMAL
NITRITE UR QL STRIP.AUTO: NEGATIVE
PH UR STRIP.AUTO: 5.5 [PH]
POCT GLUCOSE: 132 MG/DL (ref 70–110)
POCT GLUCOSE: 234 MG/DL (ref 70–110)
PROT UR QL STRIP.AUTO: >=300
RBC #/AREA URNS AUTO: ABNORMAL /HPF
RBC UR QL AUTO: ABNORMAL
SP GR UR STRIP.AUTO: >=1.03
SQUAMOUS #/AREA URNS AUTO: ABNORMAL /HPF
UROBILINOGEN UR STRIP-ACNC: 0.2
VANCOMYCIN SERPL-MCNC: 18.1 UG/ML (ref 15–20)
WBC #/AREA URNS AUTO: ABNORMAL /HPF
YEAST URNS QL MICRO: ABNORMAL /HPF

## 2023-08-23 PROCEDURE — 94761 N-INVAS EAR/PLS OXIMETRY MLT: CPT

## 2023-08-23 PROCEDURE — 87077 CULTURE AEROBIC IDENTIFY: CPT | Performed by: STUDENT IN AN ORGANIZED HEALTH CARE EDUCATION/TRAINING PROGRAM

## 2023-08-23 PROCEDURE — 99900035 HC TECH TIME PER 15 MIN (STAT)

## 2023-08-23 PROCEDURE — 25000003 PHARM REV CODE 250: Performed by: STUDENT IN AN ORGANIZED HEALTH CARE EDUCATION/TRAINING PROGRAM

## 2023-08-23 PROCEDURE — 25000242 PHARM REV CODE 250 ALT 637 W/ HCPCS: Performed by: INTERNAL MEDICINE

## 2023-08-23 PROCEDURE — 25000003 PHARM REV CODE 250: Performed by: INTERNAL MEDICINE

## 2023-08-23 PROCEDURE — 11000004 HC SNF PRIVATE

## 2023-08-23 PROCEDURE — 80202 ASSAY OF VANCOMYCIN: CPT | Performed by: STUDENT IN AN ORGANIZED HEALTH CARE EDUCATION/TRAINING PROGRAM

## 2023-08-23 PROCEDURE — 63600175 PHARM REV CODE 636 W HCPCS: Performed by: INTERNAL MEDICINE

## 2023-08-23 PROCEDURE — 94760 N-INVAS EAR/PLS OXIMETRY 1: CPT

## 2023-08-23 PROCEDURE — 27000207 HC ISOLATION

## 2023-08-23 PROCEDURE — 94640 AIRWAY INHALATION TREATMENT: CPT

## 2023-08-23 PROCEDURE — 63600175 PHARM REV CODE 636 W HCPCS: Performed by: STUDENT IN AN ORGANIZED HEALTH CARE EDUCATION/TRAINING PROGRAM

## 2023-08-23 PROCEDURE — 81001 URINALYSIS AUTO W/SCOPE: CPT | Performed by: STUDENT IN AN ORGANIZED HEALTH CARE EDUCATION/TRAINING PROGRAM

## 2023-08-23 RX ORDER — SODIUM CHLORIDE 9 MG/ML
INJECTION, SOLUTION INTRAVENOUS
Status: DISCONTINUED | OUTPATIENT
Start: 2023-08-23 | End: 2023-08-23

## 2023-08-23 RX ORDER — ZINC SULFATE 50(220)MG
220 CAPSULE ORAL DAILY
Status: DISCONTINUED | OUTPATIENT
Start: 2023-08-24 | End: 2023-08-26

## 2023-08-23 RX ORDER — BISACODYL 10 MG
10 SUPPOSITORY, RECTAL RECTAL DAILY PRN
Status: DISCONTINUED | OUTPATIENT
Start: 2023-08-23 | End: 2023-09-27 | Stop reason: HOSPADM

## 2023-08-23 RX ORDER — ENOXAPARIN SODIUM 100 MG/ML
40 INJECTION SUBCUTANEOUS EVERY 12 HOURS
Status: DISCONTINUED | OUTPATIENT
Start: 2023-08-23 | End: 2023-08-25

## 2023-08-23 RX ORDER — CARVEDILOL 12.5 MG/1
12.5 TABLET ORAL 2 TIMES DAILY WITH MEALS
Status: DISCONTINUED | OUTPATIENT
Start: 2023-08-23 | End: 2023-09-27 | Stop reason: HOSPADM

## 2023-08-23 RX ORDER — MUPIROCIN 20 MG/G
OINTMENT TOPICAL 2 TIMES DAILY
Status: DISCONTINUED | OUTPATIENT
Start: 2023-08-23 | End: 2023-08-23

## 2023-08-23 RX ORDER — TALC
6 POWDER (GRAM) TOPICAL NIGHTLY PRN
Status: DISCONTINUED | OUTPATIENT
Start: 2023-08-23 | End: 2023-08-23

## 2023-08-23 RX ORDER — TRAMADOL HYDROCHLORIDE 50 MG/1
50 TABLET ORAL EVERY 6 HOURS PRN
Status: DISCONTINUED | OUTPATIENT
Start: 2023-08-23 | End: 2023-08-23

## 2023-08-23 RX ORDER — ASCORBIC ACID 500 MG
500 TABLET ORAL DAILY
Status: DISCONTINUED | OUTPATIENT
Start: 2023-08-24 | End: 2023-09-27 | Stop reason: HOSPADM

## 2023-08-23 RX ORDER — POLYETHYLENE GLYCOL 3350 17 G/17G
17 POWDER, FOR SOLUTION ORAL 3 TIMES DAILY PRN
Status: DISCONTINUED | OUTPATIENT
Start: 2023-08-23 | End: 2023-09-27 | Stop reason: HOSPADM

## 2023-08-23 RX ORDER — ESCITALOPRAM OXALATE 5 MG/1
5 TABLET ORAL DAILY
Status: DISCONTINUED | OUTPATIENT
Start: 2023-08-24 | End: 2023-09-12

## 2023-08-23 RX ORDER — IBUPROFEN 200 MG
16 TABLET ORAL
Status: DISCONTINUED | OUTPATIENT
Start: 2023-08-23 | End: 2023-09-27 | Stop reason: HOSPADM

## 2023-08-23 RX ORDER — SODIUM CHLORIDE 9 MG/ML
INJECTION, SOLUTION INTRAVENOUS
Status: DISCONTINUED | OUTPATIENT
Start: 2023-08-23 | End: 2023-09-27 | Stop reason: HOSPADM

## 2023-08-23 RX ORDER — ONDANSETRON 4 MG/1
4 TABLET, ORALLY DISINTEGRATING ORAL EVERY 6 HOURS PRN
Status: DISCONTINUED | OUTPATIENT
Start: 2023-08-23 | End: 2023-08-23

## 2023-08-23 RX ORDER — TRAMADOL HYDROCHLORIDE 50 MG/1
50 TABLET ORAL EVERY 6 HOURS PRN
Status: DISCONTINUED | OUTPATIENT
Start: 2023-08-23 | End: 2023-09-27 | Stop reason: HOSPADM

## 2023-08-23 RX ORDER — HYDROCODONE BITARTRATE AND ACETAMINOPHEN 10; 325 MG/1; MG/1
1 TABLET ORAL EVERY 6 HOURS PRN
Status: DISCONTINUED | OUTPATIENT
Start: 2023-08-23 | End: 2023-09-27 | Stop reason: HOSPADM

## 2023-08-23 RX ORDER — AMOXICILLIN 250 MG
2 CAPSULE ORAL 2 TIMES DAILY
Status: DISCONTINUED | OUTPATIENT
Start: 2023-08-23 | End: 2023-09-27 | Stop reason: HOSPADM

## 2023-08-23 RX ORDER — INSULIN ASPART 100 [IU]/ML
1-10 INJECTION, SOLUTION INTRAVENOUS; SUBCUTANEOUS
Status: DISCONTINUED | OUTPATIENT
Start: 2023-08-23 | End: 2023-09-27 | Stop reason: HOSPADM

## 2023-08-23 RX ORDER — MAG HYDROX/ALUMINUM HYD/SIMETH 200-200-20
30 SUSPENSION, ORAL (FINAL DOSE FORM) ORAL 4 TIMES DAILY PRN
Status: DISCONTINUED | OUTPATIENT
Start: 2023-08-23 | End: 2023-09-27 | Stop reason: HOSPADM

## 2023-08-23 RX ORDER — NALOXONE HCL 0.4 MG/ML
0.02 VIAL (ML) INJECTION
Status: DISCONTINUED | OUTPATIENT
Start: 2023-08-23 | End: 2023-09-27 | Stop reason: HOSPADM

## 2023-08-23 RX ORDER — TALC
9 POWDER (GRAM) TOPICAL NIGHTLY PRN
Status: DISCONTINUED | OUTPATIENT
Start: 2023-08-23 | End: 2023-08-25

## 2023-08-23 RX ORDER — ZOLPIDEM TARTRATE 5 MG/1
5 TABLET ORAL NIGHTLY PRN
Status: DISCONTINUED | OUTPATIENT
Start: 2023-08-23 | End: 2023-08-23

## 2023-08-23 RX ORDER — ACETAMINOPHEN 325 MG/1
650 TABLET ORAL EVERY 4 HOURS PRN
Status: DISCONTINUED | OUTPATIENT
Start: 2023-08-23 | End: 2023-09-27 | Stop reason: HOSPADM

## 2023-08-23 RX ORDER — BUDESONIDE 0.5 MG/2ML
1 INHALANT ORAL DAILY
Status: DISCONTINUED | OUTPATIENT
Start: 2023-08-24 | End: 2023-08-28

## 2023-08-23 RX ORDER — ONDANSETRON 2 MG/ML
4 INJECTION INTRAMUSCULAR; INTRAVENOUS EVERY 8 HOURS PRN
Status: DISCONTINUED | OUTPATIENT
Start: 2023-08-23 | End: 2023-09-27 | Stop reason: HOSPADM

## 2023-08-23 RX ORDER — SIMETHICONE 80 MG
1 TABLET,CHEWABLE ORAL 4 TIMES DAILY PRN
Status: DISCONTINUED | OUTPATIENT
Start: 2023-08-23 | End: 2023-09-27 | Stop reason: HOSPADM

## 2023-08-23 RX ORDER — SODIUM CHLORIDE 0.9 % (FLUSH) 0.9 %
10 SYRINGE (ML) INJECTION
Status: DISCONTINUED | OUTPATIENT
Start: 2023-08-23 | End: 2023-08-23

## 2023-08-23 RX ORDER — SODIUM CHLORIDE 0.9 % (FLUSH) 0.9 %
10 SYRINGE (ML) INJECTION
Status: DISCONTINUED | OUTPATIENT
Start: 2023-08-23 | End: 2023-09-27 | Stop reason: HOSPADM

## 2023-08-23 RX ORDER — GLUCAGON 1 MG
1 KIT INJECTION
Status: DISCONTINUED | OUTPATIENT
Start: 2023-08-23 | End: 2023-09-27 | Stop reason: HOSPADM

## 2023-08-23 RX ORDER — IPRATROPIUM BROMIDE AND ALBUTEROL SULFATE 2.5; .5 MG/3ML; MG/3ML
3 SOLUTION RESPIRATORY (INHALATION) EVERY 6 HOURS PRN
Status: DISCONTINUED | OUTPATIENT
Start: 2023-08-23 | End: 2023-09-27 | Stop reason: HOSPADM

## 2023-08-23 RX ORDER — IBUPROFEN 200 MG
24 TABLET ORAL
Status: DISCONTINUED | OUTPATIENT
Start: 2023-08-23 | End: 2023-09-27 | Stop reason: HOSPADM

## 2023-08-23 RX ORDER — HYDROCODONE BITARTRATE AND ACETAMINOPHEN 500; 5 MG/1; MG/1
TABLET ORAL
Status: DISCONTINUED | OUTPATIENT
Start: 2023-08-23 | End: 2023-08-23

## 2023-08-23 RX ORDER — LANOLIN ALCOHOL/MO/W.PET/CERES
1 CREAM (GRAM) TOPICAL DAILY
Status: DISCONTINUED | OUTPATIENT
Start: 2023-08-24 | End: 2023-09-27 | Stop reason: HOSPADM

## 2023-08-23 RX ORDER — ONDANSETRON 4 MG/1
8 TABLET, ORALLY DISINTEGRATING ORAL EVERY 8 HOURS PRN
Status: DISCONTINUED | OUTPATIENT
Start: 2023-08-23 | End: 2023-09-27 | Stop reason: HOSPADM

## 2023-08-23 RX ADMIN — ENOXAPARIN SODIUM 40 MG: 40 INJECTION SUBCUTANEOUS at 09:08

## 2023-08-23 RX ADMIN — CEFEPIME 2 G: 2 INJECTION, POWDER, FOR SOLUTION INTRAVENOUS at 03:08

## 2023-08-23 RX ADMIN — BUDESONIDE 0.5 MG: 0.5 INHALANT RESPIRATORY (INHALATION) at 07:08

## 2023-08-23 RX ADMIN — INSULIN DETEMIR 20 UNITS: 100 INJECTION, SOLUTION SUBCUTANEOUS at 09:08

## 2023-08-23 RX ADMIN — VANCOMYCIN HYDROCHLORIDE 1000 MG: 1 INJECTION, POWDER, LYOPHILIZED, FOR SOLUTION INTRAVENOUS at 06:08

## 2023-08-23 RX ADMIN — ZINC SULFATE 220 MG (50 MG) CAPSULE 220 MG: CAPSULE at 09:08

## 2023-08-23 RX ADMIN — TRAMADOL HYDROCHLORIDE 50 MG: 50 TABLET, FILM COATED ORAL at 12:08

## 2023-08-23 RX ADMIN — MULTIPLE VITAMINS W/ MINERALS TAB 1 TABLET: TAB at 09:08

## 2023-08-23 RX ADMIN — CARVEDILOL 12.5 MG: 25 TABLET, FILM COATED ORAL at 06:08

## 2023-08-23 RX ADMIN — CARVEDILOL 12.5 MG: 12.5 TABLET, FILM COATED ORAL at 05:08

## 2023-08-23 RX ADMIN — SITAGLIPTIN 100 MG: 100 TABLET, FILM COATED ORAL at 09:08

## 2023-08-23 RX ADMIN — INSULIN ASPART 4 UNITS: 100 INJECTION, SOLUTION INTRAVENOUS; SUBCUTANEOUS at 07:08

## 2023-08-23 RX ADMIN — HYDROCODONE BITARTRATE AND ACETAMINOPHEN 1 TABLET: 10; 325 TABLET ORAL at 04:08

## 2023-08-23 RX ADMIN — SODIUM CHLORIDE: 9 INJECTION, SOLUTION INTRAVENOUS at 06:08

## 2023-08-23 RX ADMIN — SENNOSIDES AND DOCUSATE SODIUM 2 TABLET: 50; 8.6 TABLET ORAL at 09:08

## 2023-08-23 RX ADMIN — CEFEPIME 2 G: 2 INJECTION, POWDER, FOR SOLUTION INTRAVENOUS at 09:08

## 2023-08-23 RX ADMIN — HYDROCODONE BITARTRATE AND ACETAMINOPHEN 1 TABLET: 10; 325 TABLET ORAL at 10:08

## 2023-08-23 RX ADMIN — ESCITALOPRAM 5 MG: 5 TABLET, FILM COATED ORAL at 09:08

## 2023-08-23 RX ADMIN — Medication 500 MG: at 09:08

## 2023-08-23 RX ADMIN — SODIUM CHLORIDE: 9 INJECTION, SOLUTION INTRAVENOUS at 09:08

## 2023-08-23 RX ADMIN — FERROUS SULFATE TAB 325 MG (65 MG ELEMENTAL FE) 1 EACH: 325 (65 FE) TAB at 09:08

## 2023-08-23 RX ADMIN — CEFEPIME 2 G: 2 INJECTION, POWDER, FOR SOLUTION INTRAVENOUS at 12:08

## 2023-08-23 NOTE — PROGRESS NOTES
Random level this morning was 18.1 mcg/ml.  No vancomycin dose was administered at previous facility so vancomycin 1 gm is ordered and will check random level with am labs on 8/24/23.

## 2023-08-23 NOTE — PLAN OF CARE
Obdulia states that nurse can report Margot and we can d/c patient. Called [facility] and arranged transport for patient to [facility].  Transport to be billed to patient's insurance.  He is bedboud.

## 2023-08-23 NOTE — PLAN OF CARE
Adalberto Steiner at [facility] to see if they will take pt this morning, or if they need any labs drawn.  Waiting for an answer.

## 2023-08-24 LAB
ALBUMIN SERPL-MCNC: 1.7 G/DL (ref 3.5–5)
ALBUMIN/GLOB SERPL: 0.5 RATIO (ref 1.1–2)
ALP SERPL-CCNC: 110 UNIT/L (ref 40–150)
ALT SERPL-CCNC: 8 UNIT/L (ref 0–55)
AST SERPL-CCNC: 3 UNIT/L (ref 5–34)
BASOPHILS # BLD AUTO: 0.01 X10(3)/MCL
BASOPHILS NFR BLD AUTO: 0.1 %
BILIRUB SERPL-MCNC: 0.1 MG/DL
BUN SERPL-MCNC: 23.9 MG/DL (ref 8.4–25.7)
CALCIUM SERPL-MCNC: 8 MG/DL (ref 8.4–10.2)
CHLORIDE SERPL-SCNC: 109 MMOL/L (ref 98–107)
CO2 SERPL-SCNC: 24 MMOL/L (ref 22–29)
CREAT SERPL-MCNC: 0.9 MG/DL (ref 0.73–1.18)
EOSINOPHIL # BLD AUTO: 0.19 X10(3)/MCL (ref 0–0.9)
EOSINOPHIL NFR BLD AUTO: 2.5 %
ERYTHROCYTE [DISTWIDTH] IN BLOOD BY AUTOMATED COUNT: 17.1 % (ref 11.5–17)
GFR SERPLBLD CREATININE-BSD FMLA CKD-EPI: >60 MLS/MIN/1.73/M2
GLOBULIN SER-MCNC: 3.6 GM/DL (ref 2.4–3.5)
GLUCOSE SERPL-MCNC: 267 MG/DL (ref 74–100)
HCT VFR BLD AUTO: 30 % (ref 42–52)
HGB BLD-MCNC: 8.5 G/DL (ref 14–18)
IMM GRANULOCYTES # BLD AUTO: 0.08 X10(3)/MCL (ref 0–0.04)
IMM GRANULOCYTES NFR BLD AUTO: 1.1 %
LYMPHOCYTES # BLD AUTO: 1.78 X10(3)/MCL (ref 0.6–4.6)
LYMPHOCYTES NFR BLD AUTO: 23.7 %
MAGNESIUM SERPL-MCNC: 2 MG/DL (ref 1.6–2.6)
MCH RBC QN AUTO: 24.9 PG (ref 27–31)
MCHC RBC AUTO-ENTMCNC: 28.3 G/DL (ref 33–36)
MCV RBC AUTO: 87.7 FL (ref 80–94)
MONOCYTES # BLD AUTO: 0.44 X10(3)/MCL (ref 0.1–1.3)
MONOCYTES NFR BLD AUTO: 5.9 %
NEUTROPHILS # BLD AUTO: 5 X10(3)/MCL (ref 2.1–9.2)
NEUTROPHILS NFR BLD AUTO: 66.7 %
PHOSPHATE SERPL-MCNC: 2.9 MG/DL (ref 2.3–4.7)
PLATELET # BLD AUTO: 253 X10(3)/MCL (ref 130–400)
PMV BLD AUTO: 9.7 FL (ref 7.4–10.4)
POCT GLUCOSE: 179 MG/DL (ref 70–110)
POCT GLUCOSE: 214 MG/DL (ref 70–110)
POCT GLUCOSE: 228 MG/DL (ref 70–110)
POCT GLUCOSE: 272 MG/DL (ref 70–110)
POCT GLUCOSE: 288 MG/DL (ref 70–110)
POCT GLUCOSE: 334 MG/DL (ref 70–110)
POTASSIUM SERPL-SCNC: 4 MMOL/L (ref 3.5–5.1)
PROT SERPL-MCNC: 5.3 GM/DL (ref 6.4–8.3)
RBC # BLD AUTO: 3.42 X10(6)/MCL (ref 4.7–6.1)
SODIUM SERPL-SCNC: 140 MMOL/L (ref 136–145)
VANCOMYCIN SERPL-MCNC: 16.3 UG/ML (ref 15–20)
WBC # SPEC AUTO: 7.5 X10(3)/MCL (ref 4.5–11.5)

## 2023-08-24 PROCEDURE — 63600175 PHARM REV CODE 636 W HCPCS: Performed by: STUDENT IN AN ORGANIZED HEALTH CARE EDUCATION/TRAINING PROGRAM

## 2023-08-24 PROCEDURE — 11000004 HC SNF PRIVATE

## 2023-08-24 PROCEDURE — 80202 ASSAY OF VANCOMYCIN: CPT | Performed by: STUDENT IN AN ORGANIZED HEALTH CARE EDUCATION/TRAINING PROGRAM

## 2023-08-24 PROCEDURE — 25000003 PHARM REV CODE 250: Performed by: STUDENT IN AN ORGANIZED HEALTH CARE EDUCATION/TRAINING PROGRAM

## 2023-08-24 PROCEDURE — 25000242 PHARM REV CODE 250 ALT 637 W/ HCPCS: Performed by: STUDENT IN AN ORGANIZED HEALTH CARE EDUCATION/TRAINING PROGRAM

## 2023-08-24 PROCEDURE — 84100 ASSAY OF PHOSPHORUS: CPT | Performed by: STUDENT IN AN ORGANIZED HEALTH CARE EDUCATION/TRAINING PROGRAM

## 2023-08-24 PROCEDURE — 97161 PT EVAL LOW COMPLEX 20 MIN: CPT

## 2023-08-24 PROCEDURE — 80053 COMPREHEN METABOLIC PANEL: CPT | Performed by: STUDENT IN AN ORGANIZED HEALTH CARE EDUCATION/TRAINING PROGRAM

## 2023-08-24 PROCEDURE — 27000207 HC ISOLATION

## 2023-08-24 PROCEDURE — 94640 AIRWAY INHALATION TREATMENT: CPT

## 2023-08-24 PROCEDURE — 85025 COMPLETE CBC W/AUTO DIFF WBC: CPT | Performed by: STUDENT IN AN ORGANIZED HEALTH CARE EDUCATION/TRAINING PROGRAM

## 2023-08-24 PROCEDURE — 83735 ASSAY OF MAGNESIUM: CPT | Performed by: STUDENT IN AN ORGANIZED HEALTH CARE EDUCATION/TRAINING PROGRAM

## 2023-08-24 RX ORDER — LIDOCAINE HYDROCHLORIDE 40 MG/ML
4 SOLUTION TOPICAL
Status: DISCONTINUED | OUTPATIENT
Start: 2023-08-25 | End: 2023-08-24

## 2023-08-24 RX ORDER — LIDOCAINE HYDROCHLORIDE 20 MG/ML
JELLY TOPICAL
Status: DISCONTINUED | OUTPATIENT
Start: 2023-08-25 | End: 2023-09-27 | Stop reason: HOSPADM

## 2023-08-24 RX ADMIN — VANCOMYCIN HYDROCHLORIDE 1000 MG: 1 INJECTION, POWDER, LYOPHILIZED, FOR SOLUTION INTRAVENOUS at 04:08

## 2023-08-24 RX ADMIN — SODIUM CHLORIDE: 9 INJECTION, SOLUTION INTRAVENOUS at 04:08

## 2023-08-24 RX ADMIN — CEFEPIME 2 G: 2 INJECTION, POWDER, FOR SOLUTION INTRAVENOUS at 11:08

## 2023-08-24 RX ADMIN — INSULIN ASPART 6 UNITS: 100 INJECTION, SOLUTION INTRAVENOUS; SUBCUTANEOUS at 04:08

## 2023-08-24 RX ADMIN — OXYCODONE HYDROCHLORIDE AND ACETAMINOPHEN 500 MG: 500 TABLET ORAL at 09:08

## 2023-08-24 RX ADMIN — CEFEPIME 2 G: 2 INJECTION, POWDER, FOR SOLUTION INTRAVENOUS at 08:08

## 2023-08-24 RX ADMIN — FERROUS SULFATE TAB 325 MG (65 MG ELEMENTAL FE) 1 EACH: 325 (65 FE) TAB at 09:08

## 2023-08-24 RX ADMIN — IPRATROPIUM BROMIDE AND ALBUTEROL SULFATE 3 ML: .5; 3 SOLUTION RESPIRATORY (INHALATION) at 07:08

## 2023-08-24 RX ADMIN — TRAZODONE HYDROCHLORIDE 25 MG: 50 TABLET ORAL at 09:08

## 2023-08-24 RX ADMIN — INSULIN DETEMIR 20 UNITS: 100 INJECTION, SOLUTION SUBCUTANEOUS at 09:08

## 2023-08-24 RX ADMIN — MULTIPLE VITAMINS W/ MINERALS TAB 1 TABLET: TAB at 09:08

## 2023-08-24 RX ADMIN — BUDESONIDE INHALATION 0.5 MG: 0.5 SUSPENSION RESPIRATORY (INHALATION) at 07:08

## 2023-08-24 RX ADMIN — CARVEDILOL 12.5 MG: 12.5 TABLET, FILM COATED ORAL at 04:08

## 2023-08-24 RX ADMIN — SODIUM CHLORIDE: 9 INJECTION, SOLUTION INTRAVENOUS at 11:08

## 2023-08-24 RX ADMIN — SODIUM CHLORIDE: 9 INJECTION, SOLUTION INTRAVENOUS at 08:08

## 2023-08-24 RX ADMIN — INSULIN ASPART 4 UNITS: 100 INJECTION, SOLUTION INTRAVENOUS; SUBCUTANEOUS at 05:08

## 2023-08-24 RX ADMIN — ENOXAPARIN SODIUM 40 MG: 40 INJECTION SUBCUTANEOUS at 09:08

## 2023-08-24 RX ADMIN — SITAGLIPTIN 100 MG: 50 TABLET, FILM COATED ORAL at 09:08

## 2023-08-24 RX ADMIN — CEFEPIME 2 G: 2 INJECTION, POWDER, FOR SOLUTION INTRAVENOUS at 04:08

## 2023-08-24 RX ADMIN — ESCITALOPRAM 5 MG: 5 TABLET, FILM COATED ORAL at 09:08

## 2023-08-24 RX ADMIN — INSULIN ASPART 4 UNITS: 100 INJECTION, SOLUTION INTRAVENOUS; SUBCUTANEOUS at 11:08

## 2023-08-24 RX ADMIN — ZINC SULFATE 220 MG (50 MG) CAPSULE 220 MG: CAPSULE at 09:08

## 2023-08-24 RX ADMIN — INSULIN ASPART 3 UNITS: 100 INJECTION, SOLUTION INTRAVENOUS; SUBCUTANEOUS at 09:08

## 2023-08-24 NOTE — PLAN OF CARE
[facility]  Initial Discharge Assessment       Primary Care Provider: Jennifer Fernando NP    Admission Diagnosis: Sacral ulcer [L98.429]    Admission Date: 8/23/2023  Expected Discharge Date:     Transition of Care Barriers: None    Payor: MEDICARE / Plan: MEDICARE PART A & B / Product Type: Government /     Extended Emergency Contact Information  Primary Emergency Contact: Judy Galvan  Mobile Phone: [phone]  Relation: Mother  Preferred language: English   needed? No  Secondary Emergency Contact: jolly Russell  Mobile Phone: [phone]  Relation: Significant other  Preferred language: English   needed? No    Discharge Plan A: Home Health, Home with family         [facility] - [address] [address]  Phone: [phone] Fax: [phone]    [id number] [address] AT [facility] & [address] [address]  Phone: [phone] Fax: [phone]      Initial Assessment (most recent)       Adult Discharge Assessment - 08/24/23 1823          Discharge Assessment    Assessment Type Discharge Planning Assessment     Confirmed/corrected address, phone number and insurance Yes     Confirmed Demographics Correct on Facesheet     Source of Information family     If unable to respond/provide information was family/caregiver contacted? Yes     Contact Name/Number Judy echeverria      Communicated SADE with patient/caregiver Date not available/Unable to determine     Reason For Admission Ulcers     People in Home parent(s)     Facility Arrived From: [facility]     Do you expect to return to your current living situation? Yes     Do you have help at home or someone to help you manage your care at home? Yes     Who are your caregiver(s) and their phone number(s)? Judy mother      Prior to hospitilization  cognitive status: Alert/Oriented     Current cognitive status: Alert/Oriented     Home Accessibility wheelchair accessible     Home Layout Able to live on 1st floor     Equipment Currently Used at Home bedside commode;slide board;hospital bed;lift device;power chair     Readmission within 30 days? No     Patient currently being followed by outpatient case management? No     Do you currently have service(s) that help you manage your care at home? No     Is the pt/caregiver preference to resume services with current agency No     Do you take prescription medications? Yes     Do you have prescription coverage? Yes     Do you have any problems affording any of your prescribed medications? TBD     Is the patient taking medications as prescribed? yes     Who is going to help you get home at discharge? Judy mother      How do you get to doctors appointments? family or friend will provide     Are you on dialysis? No     Do you take coumadin? No     DME Needed Upon Discharge  none     Discharge Plan discussed with: Parent(s)     Name(s) and Number(s) Judy echeverria      Transition of Care Barriers None     Discharge Plan A Home Health;Home with family        Physical Activity    On average, how many days per week do you engage in moderate to strenuous exercise (like a brisk walk)? 0 days     On average, how many minutes do you engage in exercise at this level? 0 min        Financial Resource Strain    How hard is it for you to pay for the very basics like food, housing, medical care, and heating? Not hard at all        Housing Stability    In the last 12 months, was there a time when you were not able to pay the mortgage or rent on time? No     In the last 12 months, how many places have you lived? 1     In the last 12 months, was there a time when you did not have a steady place to sleep or slept in a shelter (including now)? No        Transportation Needs    In the past 12 months, has lack of  transportation kept you from medical appointments or from getting medications? No     In the past 12 months, has lack of transportation kept you from meetings, work, or from getting things needed for daily living? No        Food Insecurity    Within the past 12 months, you worried that your food would run out before you got the money to buy more. Never true     Within the past 12 months, the food you bought just didn't last and you didn't have money to get more. Never true        Stress    Do you feel stress - tense, restless, nervous, or anxious, or unable to sleep at night because your mind is troubled all the time - these days? Only a little        Social Connections    In a typical week, how many times do you talk on the phone with family, friends, or neighbors? More than three times a week     How often do you get together with friends or relatives? More than three times a week     How often do you attend Jewish or Moravian services? 1 to 4 times per year     How often do you attend meetings of the clubs or organizations you belong to? 1 to 4 times per year     Are you , , , , never , or living with a partner? Never         Alcohol Use    Q1: How often do you have a drink containing alcohol? Never     Q2: How many drinks containing alcohol do you have on a typical day when you are drinking? Patient does not drink     Q3: How often do you have six or more drinks on one occasion? Never        OTHER    Name(s) of People in Home Judy echeverria

## 2023-08-24 NOTE — PROGRESS NOTES
Inpatient Nutrition Evaluation    Admit Date: 8/23/2023   Total duration of encounter: 1 day    Nutrition Recommendation/Prescription     Continue diabetic diet 2. Continue boost glucose control, once a day. Pt reports only can drink once a day. 3. Continue Cole BID used in-house arginaid (substitution). 4. Continue vitamin C, multivitamin, ferrous sulfate as ordered.     Nutrition Assessment     Chart Review    Reason Seen: continuous nutrition monitoring and length of stay    Malnutrition Screening Tool Results   Have you recently lost weight without trying?: No  Have you been eating poorly because of a decreased appetite?: No   MST Score: 0     Diagnosis:  Open wound of left hip    Relevant Medical History: A-fib  Date Unknown  Cardiac arrest   Date Unknown  Chronic skin ulcer  Date Unknown  DM (diabetes mellitus)  Date Unknown  Infected decubitus ulcer  Date Unknown  Lymphedema of leg  Date Unknown  Neurogenic bladder  Date Unknown  Obesity, unspecified  Date Unknown  Pressure ulcer of heel  Date Unknown  Pressure ulcer of right foot, stage 3  Date Unknown  Pressure ulcer of right ischium  Date Unknown  Quadriplegia  Date Unknown  Quadriplegic spinal paralysis  Balaji as Reviewed    Nutrition-Related Medications: ascorbic acid, ferrous sulfate, insulin aspart, insulin detemir, multivitamin, senna-docusate, vancomycin    Nutrition-Related Labs:   Latest Reference Range & Units 08/24/23 03:43   Sodium 136 - 145 mmol/L 140   Potassium 3.5 - 5.1 mmol/L 4.0   Chloride 98 - 107 mmol/L 109 (H)   CO2 22 - 29 mmol/L 24   BUN 8.4 - 25.7 mg/dL 23.9   Creatinine 0.73 - 1.18 mg/dL 0.90   eGFR mls/min/1.73/m2 >60   Glucose 74 - 100 mg/dL 267 (H)   Calcium 8.4 - 10.2 mg/dL 8.0 (L)   Phosphorus 2.3 - 4.7 mg/dL 2.9   Magnesium 1.60 - 2.60 mg/dL 2.00   Alkaline Phosphatase 40 - 150 unit/L 110   PROTEIN TOTAL 6.4 - 8.3 gm/dL 5.3 (L)   Albumin 3.5 - 5.0 g/dL 1.7 (L)   Albumin/Globulin Ratio 1.1 - 2.0 ratio 0.5 (L)   BILIRUBIN TOTAL  "<=1.5 mg/dL 0.1   AST 5 - 34 unit/L 3 (L)   ALT 0 - 55 unit/L 8   Globulin, Total 2.4 - 3.5 gm/dL 3.6 (H)   (H): Data is abnormally high  (L): Data is abnormally low    Diet Order: Diet diabetic  Oral Supplement Order: Boost Glucose Control and Cole  Appetite/Oral Intake: poor/25-50% of meals  Factors Affecting Nutritional Intake: decreased appetite  Food/[facility]/Cultural Preferences:  turkey sandwich  Food Allergies: none reported    Skin Integrity: wound, drain/device(s)  Wound(s):       Comments    Pt reports intake decrease. Family present. Average of meals over 6 days is 43%. Pt requested turkey/cheese sandwich with slice tomato. Pt reports drinking boost glucose control. Likes chocolate. Encourage to drink arginaid for would healing.     Anthropometrics    Height: 5' 7.99" (172.7 cm) Height Method: Stated  Last Weight: 106.6 kg (235 lb 0.2 oz) (08/23/23 1605) Weight Method: Bed Scale  BMI (Calculated): 35.7  BMI Classification: obese grade II (BMI 35-39.9)        Ideal Body Weight (IBW), Male: 153.94 lb     % Ideal Body Weight, Male (lb): 152.66 %                          Usual Weight Provided By:  unable to provide UBW. Pt did reports 70# wt loss since May 2023    Wt Readings from Last 5 Encounters:   08/23/23 106.6 kg (235 lb 0.2 oz)   08/16/23 106.4 kg (234 lb 9.6 oz)   08/15/23 113.4 kg (250 lb)   08/14/23 108.9 kg (240 lb)   06/23/23 100.7 kg (222 lb 0.1 oz)     Weight Change(s) Since Admission:  Admit Weight: 106.6 kg (235 lb 0.2 oz) (08/23/23 1605)      Patient Education        Monitoring & Evaluation     Dietitian will monitor food and beverage intake, weight, weight change, electrolyte/renal panel, glucose/endocrine profile, and gastrointestinal profile.  Nutrition Risk/Follow-Up: low (follow-up in 5-7 days)  Patients assigned 'low nutrition risk' status do not qualify for a full nutritional assessment but will be monitored and re-evaluated in a 5-7 day time period. Please consult if re-evaluation " needed sooner.

## 2023-08-24 NOTE — PT/OT/SLP EVAL
Physical Therapy Evaluation and Treatment    Patient Name: Antonio Galvan II   MRN: [id number]  Recent Surgery: * No surgery found *   _ recent wound debridement     Recommendations:     Discharge Recommendations: home, home with home health   Discharge Equipment Recommendations: none   Barriers to discharge: Ongoing medical treatment    Assessment:     Antonio Galvan II is a 57 y.o. male admitted with a medical hx of quadriplegia and new diagnosis of Open wound of left hip. He presents with the following impairments/functional limitations: impaired functional mobility, decreased upper extremity function, decreased lower extremity function, abnormal tone, decreased ROM, impaired joint extensibility, impaired muscle length, impaired skin.     Rehab Prognosis: Fair; patient would benefit from acute PT services to address these deficits and reach maximum level of function.    Plan:     During this hospitalization, patient to be seen 6 x/week (QD) to address the above listed problems via therapeutic exercises, therapeutic activities    Plan of Care Expires: 09/21/23    Subjective     Chief Complaint: Being stuck in the bed and having work to do  Patient Comments/Goals: get back to work  Pain/Comfort:  Pain Rating 1: 0/10  Pain Rating Post-Intervention 1: 0/10    Social History:  Living Environment: Patient  lives at home with 24/7 care  in a single story home with  all needs and equipment in place for his care.   Prior Level of Function: Prior to admission, patient  works from his power chair, but is dependent with all functional mobility and ADLs  Equipment Used at Home: hospital bed, slide board, power chair, lift device  DME owned (not currently used): none  Assistance Upon Discharge: family and sitter(s)    Objective:     Communicated with nursing  prior to session. Patient found HOB elevated with perineural catheter, PICC line, pressure relief boots upon PT entry to room.    General Precautions: Standard,      Orthopedic Precautions:     Braces:      Respiratory Status: Room air    Exams:  Cognition: Patient is oriented to Person, Place, Time, Situation  R UE and LE ROM: Deficits: lacking 75% of normal ROM  R UE and LE Strength: Deficits: absent, only tone  LUE and LE ROM: Deficits: Lacking 25-50% of normal ROM  LUE and LE Strength: Deficits: absent, only tone  Sensation: Absent B LE and UE and trunk      Functional Mobility:  Bed Mobility  Rolling Left: dependence   Rolling Right: dependence   Supine to Sit: dependence  for LE management and trunk management  Transfers (Not allowed to stay up in chair due to wound at this time)  Bed to Chair: dependence  with tess or assisting personal aide using Slide Board    AM-PAC 6 CLICK MOBILITY  Total Score:6    Patient left HOB elevated with all lines intact, call button in reach, RN notified, and properly positioned .    GOALS:   Multidisciplinary Problems       Physical Therapy Goals          Problem: Physical Therapy    Goal Priority Disciplines Outcome Goal Variances Interventions   Physical Therapy Goal     PT, PT/OT Ongoing, Progressing     Description: Goals to be met by: Discharge     Patient will increase functional independence with mobility by performin.  Pt to receive PROM BLEs and UEs 5-6 d/wk, qd,  to prevent further contractures and ensure ability for positioning in WC, along with proper positioning to reduce further skin break down  2.  Pt to be assessed for appropriateness for out of bed to  - awaiting wound care                       History:     Past Medical History:   Diagnosis Date    A-fib     Cardiac arrest     2022    Chronic skin ulcer     DM (diabetes mellitus)     Infected decubitus ulcer     Lymphedema of leg     Neurogenic bladder     Obesity, unspecified     Pressure ulcer of heel     Pressure ulcer of right foot, stage 3     Pressure ulcer of right ischium     Quadriplegia     Quadriplegic spinal paralysis        Past Surgical History:    Procedure Laterality Date    APPLICATION OF WOUND VACUUM-ASSISTED CLOSURE DEVICE Right 5/12/2023    Procedure: APPLICATION, WOUND VAC;  Surgeon: Keyonna Jean MD;  Location: [facility] OR;  Service: General;  Laterality: Right;    DEBRIDEMENT OF BUTTOCKS Right 5/12/2023    Procedure: DEBRIDEMENT, BUTTOCK;  Surgeon: Keyonna Jean MD;  Location: [facility] OR;  Service: General;  Laterality: Right;    INCISION AND DRAINAGE HIP Bilateral 8/16/2023    Procedure: INCISION AND DRAINAGE, HIP;  Surgeon: Ryan Tirado MD;  Location: [facility] OR;  Service: General;  Laterality: Bilateral;  1. Excisional debridement skin to bone, skin to bone with biopsy and debridement of hard bone at the Left hip necrotic wound 11 cm long 5-1/2 cm wide 4-1/2 cm deep upon completion of debridement   2. Excisional debridement skin to muscle right hip with debridement       Time Tracking:     PT Received On: 08/24/23  PT Start Time: 1023  PT Stop Time: 1045  PT Total Time (min): 22 min     Billable Minutes: Evaluation low complexity     8/24/2023
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
## 2023-08-24 NOTE — NURSING
Nurses Note -- 4 Eyes      8/23/2023   5:00 PM      Skin assessed during: Admit      [] No Altered Skin Integrity Present    []Prevention Measures Documented      [x] Yes- Altered Skin Integrity Present or Discovered   [] LDA Added if Not in Epic (Describe Wound)   [] New Altered Skin Integrity was Present on Admit and Documented in LDA   [x] Wound Image Taken    Wound Care Consulted? Yes    Attending Nurse:  Ana Luisa Robison RN     Second RN/Staff Member:   Marta Farias LPN

## 2023-08-24 NOTE — PHYSICIAN QUERY
PT Name: Antonio Galvan II  MR #: [id number]     Documentation Clarification      CDS/: Rody Isaacs RN BSN             Contact information:[email or web address]    This form is a permanent document in the medical record.     Query Date: August 24, 2023    By submitting this query, we are merely seeking further clarification of documentation. Please utilize your independent clinical judgment when addressing the question(s) below.    The Medical Record reflects the following:    Supporting Clinical Findings Location in Medical Record   8/16 H&P  Past medical history DM    8/16 NM Bone Scan  1. Diffuse increased soft tissue and bone activity at the hips bilaterally.  A underlying cellulitis and osteomyelitis must be considered.  MR examination would allow further evaluation if clinically indicated     8/16 Op  Post-Operative Diagnosis      Osteomyelitis, unspecified site, unspecified type   1. Excisional debridement skin to bone, skin to bone with biopsy and debridement of hard bone at the Left hip necrotic wound 11 cm long 5-1/2 cm wide 4-1/2 cm deep upon completion of debridement   2. Excisional debridement skin to muscle right hip with debridement using 15 x 14 x 4 cm deep 8/16/23 H&P      8/16/23 NM Bone Scan          8/16/23 Op note                                                                                Provider, please clarify Osteomyelitis diagnosis :    [   ] Osteomyelitis due to and/or associated with Diabetes mellitus   x   No relationship/association between Osteomyelitis and Diabetes mellitus   [   ] Other (please specify): ____________

## 2023-08-24 NOTE — PLAN OF CARE
Problem: Adult Inpatient Plan of Care  Goal: Plan of Care Review  Outcome: Ongoing, Progressing  Flowsheets (Taken 8/24/2023 0800)  Plan of Care Reviewed With: patient  Goal: Patient-Specific Goal (Individualized)  Outcome: Ongoing, Progressing  Flowsheets (Taken 8/24/2023 0800)  Anxieties, Fears or Concerns: None verbalized  Individualized Care Needs: Monitor BP, Monitor HR, IV Antiobiotics, Monitor Blood glucose     Problem: Diabetes Comorbidity  Goal: Blood Glucose Level Within Targeted Range  Outcome: Ongoing, Progressing     Problem: Impaired Wound Healing  Goal: Optimal Wound Healing  Outcome: Ongoing, Progressing  Intervention: Promote Wound Healing  Flowsheets (Taken 8/24/2023 0800)  Oral Nutrition Promotion: calorie-dense foods provided  Sleep/Rest Enhancement:   awakenings minimized   regular sleep/rest pattern promoted   relaxation techniques promoted   room darkened     Problem: Skin Injury Risk Increased  Goal: Skin Health and Integrity  Outcome: Ongoing, Progressing  Intervention: Promote and Optimize Oral Intake  Flowsheets (Taken 8/24/2023 0800)  Oral Nutrition Promotion: calorie-dense foods provided
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
## 2023-08-24 NOTE — PROGRESS NOTES
Pharmacokinetic Assessment Follow Up: IV Vancomycin    Vancomycin serum concentration assessment(s):    The random level was drawn correctly and can be used to guide therapy at this time. The measurement is within the desired definitive target range of 15 to 20 mcg/mL.    Vancomycin Regimen Plan:    Vancomycin 1 gram x 1.   Check level with am labs on 8/25/23.    If scr stable tomorrow, start scheduled dosing.    Drug levels (last 3 results):  Recent Labs   Lab Result Units 08/22/23  0427 08/22/23  1054 08/22/23  1316 08/22/23  1708 08/23/23  1019 08/24/23  0343   Vanc Lvl Random ug/ml  --  23.3*  --   --  18.1 16.3   Vancomycin Trough ug/ml 29.2*  --  23.4* 22.2*  --   --           Patient brief summary:  Antonio Galvan II is a 57 y.o. male initiated on antimicrobial therapy with IV Vancomycin for treatment of bone/joint infection    The patient's current regimen is pulse dosing.    Drug Allergies:   Review of patient's allergies indicates:  No Known Allergies    Actual Body Weight:   106.6 kg    Renal Function:   Estimated Creatinine Clearance: 109.8 mL/min (based on SCr of 0.9 mg/dL).,     Dialysis Method (if applicable):  N/A    CBC (last 72 hours):  Recent Labs   Lab Result Units 08/22/23  0428 08/24/23  0343   WBC x10(3)/mcL 7.83 7.50   Hgb g/dL 8.9* 8.5*   Hct % 30.7* 30.0*   Platelet x10(3)/mcL 352 253   Mono % % 6.1 5.9   Eos % % 4.3 2.5   Basophil % % 0.6 0.1       Metabolic Panel (last 72 hours):  Recent Labs   Lab Result Units 08/22/23  0427 08/23/23  1926 08/24/23  0343   Sodium Level mmol/L 140  --  140   Potassium Level mmol/L 4.1  --  4.0   Chloride mmol/L 108*  --  109*   Carbon Dioxide mmol/L 24  --  24   Glucose Level mg/dL 176*  --  267*   Glucose, UA   --  100*  --    Blood Urea Nitrogen mg/dL 19.0  --  23.9   Creatinine mg/dL 0.80  --  0.90   Albumin Level g/dL 1.7*  --  1.7*   Bilirubin Total mg/dL 0.2  --  0.1   Alkaline Phosphatase unit/L 92  --  110   Aspartate Aminotransferase unit/L 5   --  3*   Alanine Aminotransferase unit/L <5  --  8   Magnesium Level mg/dL 2.00  --  2.00   Phosphorus Level mg/dL  --   --  2.9       Vancomycin Administrations:  vancomycin given in the last 96 hours                     vancomycin (VANCOCIN) 1,000 mg in dextrose 5 % (D5W) 250 mL IVPB (Vial-Mate) (mg) 1,000 mg New Bag 08/23/23 1855    vancomycin 1,250 mg in dextrose 5 % (D5W) 250 mL IVPB (Vial-Mate) (mg) 1,250 mg New Bag 08/21/23 1221                    Microbiologic Results:  Microbiology Results (last 7 days)       Procedure Component Value Units Date/Time    Urine culture [[id number]] Collected: 08/23/23 1926    Order Status: Sent Specimen: Urine Updated: 08/23/23 1940

## 2023-08-24 NOTE — PLAN OF CARE
Problem: Adult Inpatient Plan of Care  Goal: Plan of Care Review  Outcome: Ongoing, Progressing  Flowsheets (Taken 8/24/2023 0635)  Plan of Care Reviewed With:   patient   family  Goal: Patient-Specific Goal (Individualized)  Outcome: Ongoing, Progressing  Flowsheets (Taken 8/24/2023 0635)  Anxieties, Fears or Concerns: pt concerned about bowel programming  Individualized Care Needs: Wound care, Monitor HR, Wilberto transfer, Total Care  Goal: Absence of Hospital-Acquired Illness or Injury  Outcome: Ongoing, Progressing  Intervention: Prevent and Manage VTE (Venous Thromboembolism) Risk  Flowsheets (Taken 8/23/2023 2000)  VTE Prevention/Management:   bleeding precations maintained   bleeding risk assessed   fluids promoted   ROM (passive) performed  Range of Motion: active ROM (range of motion) encouraged  Intervention: Prevent Infection  Flowsheets (Taken 8/24/2023 0635)  Infection Prevention:   hand hygiene promoted   personal protective equipment utilized   rest/sleep promoted   single patient room provided   equipment surfaces disinfected  Goal: Optimal Comfort and Wellbeing  Outcome: Ongoing, Progressing  Intervention: Monitor Pain and Promote Comfort  Flowsheets (Taken 8/23/2023 2000)  Pain Management Interventions:   care clustered   diversional activity provided   medication offered   pain management plan reviewed with patient/caregiver   pillow support provided   position adjusted   premedicated for activity   relaxation techniques promoted   quiet environment facilitated     Problem: Diabetes Comorbidity  Goal: Blood Glucose Level Within Targeted Range  Outcome: Ongoing, Progressing

## 2023-08-25 LAB
ANION GAP SERPL CALC-SCNC: 7 MEQ/L
BASOPHILS # BLD AUTO: 0.02 X10(3)/MCL
BASOPHILS NFR BLD AUTO: 0.3 %
BUN SERPL-MCNC: 24 MG/DL (ref 8.4–25.7)
CALCIUM SERPL-MCNC: 7.9 MG/DL (ref 8.4–10.2)
CHLORIDE SERPL-SCNC: 110 MMOL/L (ref 98–107)
CO2 SERPL-SCNC: 23 MMOL/L (ref 22–29)
CREAT SERPL-MCNC: 0.81 MG/DL (ref 0.73–1.18)
CREAT/UREA NIT SERPL: 30
EOSINOPHIL # BLD AUTO: 0.22 X10(3)/MCL (ref 0–0.9)
EOSINOPHIL NFR BLD AUTO: 3.4 %
ERYTHROCYTE [DISTWIDTH] IN BLOOD BY AUTOMATED COUNT: 17.2 % (ref 11.5–17)
GFR SERPLBLD CREATININE-BSD FMLA CKD-EPI: >60 MLS/MIN/1.73/M2
GLUCOSE SERPL-MCNC: 227 MG/DL (ref 74–100)
HCT VFR BLD AUTO: 27.6 % (ref 42–52)
HGB BLD-MCNC: 8 G/DL (ref 14–18)
IMM GRANULOCYTES # BLD AUTO: 0.06 X10(3)/MCL (ref 0–0.04)
IMM GRANULOCYTES NFR BLD AUTO: 0.9 %
LYMPHOCYTES # BLD AUTO: 2.03 X10(3)/MCL (ref 0.6–4.6)
LYMPHOCYTES NFR BLD AUTO: 31.4 %
MCH RBC QN AUTO: 25.5 PG (ref 27–31)
MCHC RBC AUTO-ENTMCNC: 29 G/DL (ref 33–36)
MCV RBC AUTO: 87.9 FL (ref 80–94)
MONOCYTES # BLD AUTO: 0.42 X10(3)/MCL (ref 0.1–1.3)
MONOCYTES NFR BLD AUTO: 6.5 %
NEUTROPHILS # BLD AUTO: 3.72 X10(3)/MCL (ref 2.1–9.2)
NEUTROPHILS NFR BLD AUTO: 57.5 %
PLATELET # BLD AUTO: 222 X10(3)/MCL (ref 130–400)
PMV BLD AUTO: 9.9 FL (ref 7.4–10.4)
POCT GLUCOSE: 199 MG/DL (ref 70–110)
POCT GLUCOSE: 211 MG/DL (ref 70–110)
POTASSIUM SERPL-SCNC: 4.3 MMOL/L (ref 3.5–5.1)
RBC # BLD AUTO: 3.14 X10(6)/MCL (ref 4.7–6.1)
SODIUM SERPL-SCNC: 140 MMOL/L (ref 136–145)
VANCOMYCIN SERPL-MCNC: 18.7 UG/ML (ref 15–20)
WBC # SPEC AUTO: 6.47 X10(3)/MCL (ref 4.5–11.5)

## 2023-08-25 PROCEDURE — 80048 BASIC METABOLIC PNL TOTAL CA: CPT | Performed by: STUDENT IN AN ORGANIZED HEALTH CARE EDUCATION/TRAINING PROGRAM

## 2023-08-25 PROCEDURE — 94640 AIRWAY INHALATION TREATMENT: CPT

## 2023-08-25 PROCEDURE — 11000004 HC SNF PRIVATE

## 2023-08-25 PROCEDURE — 94760 N-INVAS EAR/PLS OXIMETRY 1: CPT

## 2023-08-25 PROCEDURE — 85025 COMPLETE CBC W/AUTO DIFF WBC: CPT | Performed by: STUDENT IN AN ORGANIZED HEALTH CARE EDUCATION/TRAINING PROGRAM

## 2023-08-25 PROCEDURE — 97110 THERAPEUTIC EXERCISES: CPT

## 2023-08-25 PROCEDURE — 25000003 PHARM REV CODE 250: Performed by: STUDENT IN AN ORGANIZED HEALTH CARE EDUCATION/TRAINING PROGRAM

## 2023-08-25 PROCEDURE — 63600175 PHARM REV CODE 636 W HCPCS: Performed by: STUDENT IN AN ORGANIZED HEALTH CARE EDUCATION/TRAINING PROGRAM

## 2023-08-25 PROCEDURE — 27000207 HC ISOLATION

## 2023-08-25 PROCEDURE — 25000242 PHARM REV CODE 250 ALT 637 W/ HCPCS: Performed by: STUDENT IN AN ORGANIZED HEALTH CARE EDUCATION/TRAINING PROGRAM

## 2023-08-25 PROCEDURE — 80202 ASSAY OF VANCOMYCIN: CPT | Performed by: STUDENT IN AN ORGANIZED HEALTH CARE EDUCATION/TRAINING PROGRAM

## 2023-08-25 RX ORDER — ENOXAPARIN SODIUM 150 MG/ML
1 INJECTION SUBCUTANEOUS EVERY 24 HOURS
Status: DISCONTINUED | OUTPATIENT
Start: 2023-08-26 | End: 2023-08-25

## 2023-08-25 RX ORDER — ENOXAPARIN SODIUM 150 MG/ML
1 INJECTION SUBCUTANEOUS EVERY 24 HOURS
Status: DISCONTINUED | OUTPATIENT
Start: 2023-08-25 | End: 2023-08-25

## 2023-08-25 RX ORDER — TRAZODONE HYDROCHLORIDE 50 MG/1
50 TABLET ORAL NIGHTLY
Status: DISCONTINUED | OUTPATIENT
Start: 2023-08-25 | End: 2023-09-03

## 2023-08-25 RX ORDER — ENOXAPARIN SODIUM 150 MG/ML
1 INJECTION SUBCUTANEOUS EVERY 12 HOURS
Status: DISCONTINUED | OUTPATIENT
Start: 2023-08-25 | End: 2023-08-28

## 2023-08-25 RX ORDER — TALC
9 POWDER (GRAM) TOPICAL NIGHTLY
Status: DISCONTINUED | OUTPATIENT
Start: 2023-08-25 | End: 2023-09-03

## 2023-08-25 RX ORDER — FLUCONAZOLE 200 MG/1
200 TABLET ORAL DAILY
Status: COMPLETED | OUTPATIENT
Start: 2023-08-25 | End: 2023-09-07

## 2023-08-25 RX ADMIN — INSULIN ASPART 5 UNITS: 100 INJECTION, SOLUTION INTRAVENOUS; SUBCUTANEOUS at 10:08

## 2023-08-25 RX ADMIN — BUDESONIDE INHALATION 0.5 MG: 0.5 SUSPENSION RESPIRATORY (INHALATION) at 07:08

## 2023-08-25 RX ADMIN — VANCOMYCIN HYDROCHLORIDE 1000 MG: 1 INJECTION, POWDER, LYOPHILIZED, FOR SOLUTION INTRAVENOUS at 10:08

## 2023-08-25 RX ADMIN — TRAZODONE HYDROCHLORIDE 50 MG: 50 TABLET ORAL at 10:08

## 2023-08-25 RX ADMIN — INSULIN DETEMIR 20 UNITS: 100 INJECTION, SOLUTION SUBCUTANEOUS at 10:08

## 2023-08-25 RX ADMIN — INSULIN ASPART 2 UNITS: 100 INJECTION, SOLUTION INTRAVENOUS; SUBCUTANEOUS at 06:08

## 2023-08-25 RX ADMIN — FLUCONAZOLE 200 MG: 200 TABLET ORAL at 05:08

## 2023-08-25 RX ADMIN — HYDROCODONE BITARTRATE AND ACETAMINOPHEN 1 TABLET: 10; 325 TABLET ORAL at 12:08

## 2023-08-25 RX ADMIN — OXYCODONE HYDROCHLORIDE AND ACETAMINOPHEN 500 MG: 500 TABLET ORAL at 10:08

## 2023-08-25 RX ADMIN — HYDROCODONE BITARTRATE AND ACETAMINOPHEN 1 TABLET: 10; 325 TABLET ORAL at 10:08

## 2023-08-25 RX ADMIN — SODIUM CHLORIDE: 9 INJECTION, SOLUTION INTRAVENOUS at 02:08

## 2023-08-25 RX ADMIN — SODIUM CHLORIDE: 9 INJECTION, SOLUTION INTRAVENOUS at 04:08

## 2023-08-25 RX ADMIN — MELATONIN TAB 3 MG 9 MG: 3 TAB at 10:08

## 2023-08-25 RX ADMIN — SODIUM CHLORIDE: 9 INJECTION, SOLUTION INTRAVENOUS at 10:08

## 2023-08-25 RX ADMIN — CARVEDILOL 12.5 MG: 12.5 TABLET, FILM COATED ORAL at 05:08

## 2023-08-25 RX ADMIN — IPRATROPIUM BROMIDE AND ALBUTEROL SULFATE 3 ML: .5; 3 SOLUTION RESPIRATORY (INHALATION) at 07:08

## 2023-08-25 RX ADMIN — ENOXAPARIN SODIUM 105 MG: 120 INJECTION SUBCUTANEOUS at 10:08

## 2023-08-25 RX ADMIN — SENNOSIDES AND DOCUSATE SODIUM 2 TABLET: 8.6; 5 TABLET ORAL at 10:08

## 2023-08-25 RX ADMIN — CEFEPIME 2 G: 2 INJECTION, POWDER, FOR SOLUTION INTRAVENOUS at 11:08

## 2023-08-25 RX ADMIN — CEFEPIME 2 G: 2 INJECTION, POWDER, FOR SOLUTION INTRAVENOUS at 04:08

## 2023-08-25 NOTE — PROGRESS NOTES
[facility] - Medical Surgical Unit  Wound Care    Patient Name:  Antonio Galvan II   MRN:  [id number]  Date: 8/25/2023  Diagnosis: Open wound of left hip    History:     Past Medical History:   Diagnosis Date    A-fib     Cardiac arrest     7/2022    Chronic skin ulcer     DM (diabetes mellitus)     Infected decubitus ulcer     Lymphedema of leg     Neurogenic bladder     Obesity, unspecified     Pressure ulcer of heel     Pressure ulcer of right foot, stage 3     Pressure ulcer of right ischium     Quadriplegia     Quadriplegic spinal paralysis        Social History     Socioeconomic History    Marital status: Single   Tobacco Use    Smoking status: Never    Smokeless tobacco: Never   Substance and Sexual Activity    Alcohol use: Never    Drug use: Never    Sexual activity: Not Currently     Social Determinants of Health     Financial Resource Strain: Low Risk  (8/24/2023)    Overall Financial Resource Strain (CARDIA)     Difficulty of Paying Living Expenses: Not hard at all   Food Insecurity: No Food Insecurity (8/24/2023)    Hunger Vital Sign     Worried About Running Out of Food in the Last Year: Never true     Ran Out of Food in the Last Year: Never true   Transportation Needs: No Transportation Needs (8/24/2023)    PRAPARE - Transportation     Lack of Transportation (Medical): No     Lack of Transportation (Non-Medical): No   Physical Activity: Inactive (8/24/2023)    Exercise Vital Sign     Days of Exercise per Week: 0 days     Minutes of Exercise per Session: 0 min   Stress: No Stress Concern Present (8/24/2023)    [employer] - Occupational Stress Questionnaire     Feeling of Stress : Only a little   Social Connections: Moderately Integrated (8/24/2023)    Social Connection and Isolation Panel [NHANES]     Frequency of Communication with Friends and Family: More than three times a week     Frequency of Social Gatherings with Friends and Family: More than three times a  week     Attends Congregation Services: 1 to 4 times per year     Active Member of Clubs or Organizations: Patient refused     Attends Club or Organization Meetings: 1 to 4 times per year     Marital Status: Never    Housing Stability: Low Risk  (8/24/2023)    Housing Stability Vital Sign     Unable to Pay for Housing in the Last Year: No     Number of Places Lived in the Last Year: 1     Unstable Housing in the Last Year: No       Precautions:     Allergies as of 08/23/2023    (No Known Allergies)       [facility] Assessment Details/Treatment     New wound care consult obtained.   Pt transferred to [facility] from [facility] for IV abx s/t osteomyelitis of L hip.   Excisional debridement to muscle R hip / excisional debridement to /including bone to L hip with Dr. KALINA Tirado on 8/16/23.  Hx of Stg 4 pressure injuries noted to both sites noted prior to debridement.   Pt previously treated with 6 weeks of IV abx in May/June 2023 for osteomyelitis to R posterior hip.  Upon examination of both R posterior hip and L lateral hip today, no palpable bone present to either site.   No gross purulence observed.  No erythema, order or edema present to periwound.  Small amount of non viable tissue present to both sites, evidence of cauterization to wound bed remain visible. No active bleeding observed during assessment.  Stg 3 pressure injury with red granular tissue / scarring to periwound present. Stable dry eschar present to L anterior and posterior ankle area(Unstageable altered skin integrity) Recommend keeping eschar dry intact and stable at this time with betadine daily to prevent risk of infection to those sites if open area occurs.  Hx of stg 3 pressure injury to R heel noted with scar tissue to periwound, small superficial open area remains.  Recommend betadine to area with daily wrapping.   Heel lift boots in use for offloading, Bariatric ALAL mattress,turn q 2 hrs in place.   Pt reports improvement in appetite since surgical  intervention.  Discussed importance of nutrition due to immense nutritional need for wound healing, pressure prevention measures, localized wound care with pt and family at bedside.   Verbalized understanding.         08/24/23 1901   WOCN Assessment   WOCN Total Time (mins) 100   Visit Date 08/24/23   Consult Type New   WOCN Speciality Wound   Wound pressure;surgical   Intervention assessed;changed;chart review   Teaching on-going   Skin Interventions   Device Skin Pressure Protection absorbent pad utilized/changed;positioning supports utilized;pressure points protected   Pressure Reduction Devices specialty bed utilized;heel offloading device utilized;chair cushion utilized;alternating pressure pump (ADD)   Pressure Reduction Techniques weight shift assistance provided;positioned off wounds;heels elevated off bed   Skin Protection adhesive use limited;incontinence pads utilized;tubing/devices free from skin contact   Pressure Injury Prevention    Check Moisture Management Pad Done   Heel protection technique Heel boot   Heel preventative measures   (wound dressings in place)   Check Medical Devices Done        Altered Skin Integrity 05/06/22 1140 Right Heel  Full thickness tissue loss. Subcutaneous fat may be visible but bone, tendon or muscle are not exposed   Date First Assessed/Time First Assessed: 05/06/22 1140   Altered Skin Integrity Present on Admission: yes  Side: Right  Location: Heel  Is this injury device related?: No  Primary Wound Type: (c)   Description of Altered Skin Integrity: Full thickness...   Wound Image    Description of Altered Skin Integrity Full thickness tissue loss. Subcutaneous fat may be visible but bone, tendon or muscle are not exposed   Dressing Appearance Intact;Dried drainage   Drainage Amount Small   Drainage Characteristics/Odor Serosanguineous;No odor;Bleeding controlled   Appearance Maroon;Red;Purple;Granulating;Dry;Fibrin   Tissue loss description Full thickness   Periwound  Area Scar tissue;Dry   Wound Edges Irregular   Wound Length (cm) 4.5 cm   Wound Width (cm) 1 cm   Wound Depth (cm) 0.1 cm   Wound Volume (cm^3) 0.45 cm^3   Wound Surface Area (cm^2) 4.5 cm^2   Care Cleansed with:;Wound cleanser   Dressing Changed;Applied;Hydrofiber;Silver;Gauze;Absorptive Pad;Rolled gauze   Periwound Care Dry periwound area maintained   Off Loading Other (see comments)  (heel lift boot)   Dressing Change Due 08/26/23        Altered Skin Integrity 01/12/23 1530 Sacral spine Full thickness tissue loss with exposed bone, tendon, or muscle. Often includes undermining and tunneling. May extend into muscle and/or supporting structures.   Date First Assessed/Time First Assessed: 01/12/23 1530   Altered Skin Integrity Present on Admission - Did Patient arrive to the hospital with altered skin?: yes  Location: Sacral spine  Description of Altered Skin Integrity: Full thickness tissue los...   Wound Image    Description of Altered Skin Integrity Full thickness tissue loss. Subcutaneous fat may be visible but bone, tendon or muscle are not exposed   Dressing Appearance Intact;Moist drainage   Drainage Amount Small   Drainage Characteristics/Odor Serosanguineous;No odor;Bleeding controlled   Appearance Red;Granulating;Pink   Tissue loss description Full thickness   Red (%), Wound Tissue Color 95 %   Periwound Area Scar tissue;Moist   Wound Edges Irregular   Wound Length (cm) 4 cm   Wound Width (cm) 1 cm   Wound Depth (cm) 0.1 cm   Wound Volume (cm^3) 0.4 cm^3   Wound Surface Area (cm^2) 4 cm^2   Care Cleansed with:;Wound cleanser   Dressing Changed;Applied;Hydrofiber;Silver;Gauze;Absorptive Pad   Periwound Care Dry periwound area maintained   Dressing Change Due 08/25/23        Altered Skin Integrity 08/01/23 1535 Left anterior Ankle Other (comment) Full thickness tissue loss. Base is covered by slough and/or eschar in the wound bed   Date First Assessed/Time First Assessed: 08/01/23 1535   Altered Skin  Integrity Present on Admission - Did Patient arrive to the hospital with altered skin?: yes  Side: Left  Orientation: anterior  Location: Ankle  Is this injury device related?: No  ...   Wound Image    Dressing Appearance Intact;Dry   Drainage Amount None   Appearance Black;Eschar;Dry   Tissue loss description Full thickness   Black (%), Wound Tissue Color 100 %   Periwound Area Dry;Intact   Wound Edges Defined   Wound Length (cm) 3.5 cm   Wound Width (cm) 3.5 cm   Wound Surface Area (cm^2) 12.25 cm^2   Care Cleansed with:;Wound cleanser;Applied:;Povidone iodine   Dressing Changed;Gauze;Absorptive Pad;Rolled gauze   Off Loading   (heel lift boot)   Dressing Change Due 08/26/23        Altered Skin Integrity 08/01/23 1534 Left posterior Ankle Full thickness tissue loss. Base is covered by slough and/or eschar in the wound bed   Date First Assessed/Time First Assessed: 08/01/23 1534   Altered Skin Integrity Present on Admission - Did Patient arrive to the hospital with altered skin?: yes  Side: Left  Orientation: posterior  Location: Ankle  Description of Altered Skin Integri...   Wound Image    Dressing Appearance Intact;Dry   Drainage Amount None   Appearance Black;Eschar;Dry   Tissue loss description Full thickness   Black (%), Wound Tissue Color 100 %   Periwound Area Intact;Dry   Wound Edges Defined   Wound Length (cm) 6.5 cm   Wound Width (cm) 4 cm   Wound Depth (cm) 0.1 cm   Wound Volume (cm^3) 2.6 cm^3   Wound Surface Area (cm^2) 26 cm^2   Care Cleansed with:;Wound cleanser;Applied:;Povidone iodine   Dressing Changed;Gauze;Absorptive Pad;Rolled gauze   Off Loading Other (see comments)  (heel lift boot)   Dressing Change Due 08/26/23        Incision/Site 08/16/23 2011 Right Hip posterior   Date First Assessed/Time First Assessed: 08/16/23 2011   Side: Right  Location: Hip  Orientation: posterior   Wound Image    Dressing Appearance Intact;Moist drainage   Drainage Amount Large   Drainage Characteristics/Odor  Serous;Yellow;Serosanguineous;No odor;Bleeding controlled   Appearance Red;Tan;Granulating;Not granulating;Slough;Muscle;Fibrin;Other (see comments)  (cauterization)   Red (%), Wound Tissue Color 75 %   Periwound Area Moist;Denuded   Wound Edges Defined   Wound Length (cm) 13 cm   Wound Width (cm) 15 cm   Wound Depth (cm) 4.5 cm   Wound Volume (cm^3) 877.5 cm^3   Wound Surface Area (cm^2) 195 cm^2   Care Cleansed with:;Wound cleanser   Dressing Applied;Non-adherent;Sodium chloride impregnated;Hydrofiber;Silver;Gauze;Absorptive Pad   Dressing Change Due 08/26/23        Incision/Site 08/16/23 2011 Left Hip lateral   Date First Assessed/Time First Assessed: 08/16/23 2011   Side: Left  Location: Hip  Orientation: lateral   Wound Image    Dressing Appearance Intact;Moist drainage   Drainage Amount Large   Drainage Characteristics/Odor Serous;Yellow;Serosanguineous;No odor;Bleeding controlled   Appearance Pink;Red;Tan;Gray;Yellow;Granulating;Not granulating;Slough;Muscle   Red (%), Wound Tissue Color 75 %   Periwound Area Intact   Wound Edges Defined   Wound Length (cm) 11.5 cm   Wound Width (cm) 4 cm   Wound Depth (cm) 6 cm   Wound Volume (cm^3) 276 cm^3   Wound Surface Area (cm^2) 46 cm^2   Care Cleansed with:;Wound cleanser   Dressing Changed;Applied;Non-adherent;Gauze, wet to moist  (with NS)   Packing packed with;other (see comment)  (NS moistened gauze)   Periwound Care Dry periwound area maintained   Dressing Change Due 08/26/23       Recommendations made to primary team . Orders placed.     08/25/2023

## 2023-08-25 NOTE — PLAN OF CARE
Problem: Adult Inpatient Plan of Care  Goal: Plan of Care Review  Outcome: Ongoing, Progressing  Flowsheets (Taken 8/25/2023 0509)  Plan of Care Reviewed With:   patient   caregiver  Goal: Patient-Specific Goal (Individualized)  Outcome: Ongoing, Progressing  Flowsheets (Taken 8/25/2023 0509)  Anxieties, Fears or Concerns: none verbalized  Individualized Care Needs: Monitor BP and HR, IV abx, Monitor blood glucose  Goal: Absence of Hospital-Acquired Illness or Injury  Outcome: Ongoing, Progressing  Intervention: Prevent and Manage VTE (Venous Thromboembolism) Risk  Flowsheets (Taken 8/24/2023 2000)  VTE Prevention/Management:   bleeding precations maintained   bleeding risk assessed   dorsiflexion/plantar flexion performed   fluids promoted   ROM (active) performed  Range of Motion: active ROM (range of motion) encouraged  Intervention: Prevent Infection  Flowsheets (Taken 8/25/2023 0509)  Infection Prevention:   hand hygiene promoted   personal protective equipment utilized   rest/sleep promoted   single patient room provided  Goal: Optimal Comfort and Wellbeing  Outcome: Ongoing, Progressing  Intervention: Monitor Pain and Promote Comfort  Flowsheets (Taken 8/24/2023 2000)  Pain Management Interventions:   care clustered   diversional activity provided   pain management plan reviewed with patient/caregiver   pillow support provided   position adjusted   quiet environment facilitated   relaxation techniques promoted     Problem: Diabetes Comorbidity  Goal: Blood Glucose Level Within Targeted Range  Outcome: Ongoing, Progressing

## 2023-08-25 NOTE — PT/OT/SLP PROGRESS
Physical Therapy Treatment Note           Name: Antonio Galvan II    : 1965 (57 y.o.)  MRN: [id number]           TREATMENT SUMMARY AND RECOMMENDATIONS:    PT Received On: 23  PT Start Time: 0850     PT Stop Time: 15  PT Total Time (min): 25 min     Subjective Assessment:   No complaints  Lethargic   x Awake, alert, cooperative  Uncooperative    Agitated  c/o pain    Appropriate  c/o fatigue    Confused x Treated at bedside     Emotionally labile  Treated in gym/dept.    Impulsive  Other:    Flat affect       Therapy Precautions:    Cognitive deficits  Spinal precautions    Collar - hard  Sternal precautions    Collar - soft   TLSO    Fall risk  LSO    Hip precautions - posterior  Knee immobilizer    Hip precautions - anterior  WBAT    Impaired communication  Partial weightbearing    Oxygen  TTWB    PEG tube  NWB    Visual deficits x Other: perineural catheter, PICC line, pressure relief boots     Hearing deficits  x Quadriplegia        Treatment Objectives:     Mobility Training:   Assist level Comments    Bed mobility     Transfer     Gait     Sit to stand transitions     Sitting balance     Standing balance      Wheelchair mobility     Car transfer     Other:          Therapeutic Exercise:   Exercise Sets Reps Comments   PROM to B UE and LE, all joints and digits   All functional planes to tolerance with sustained hold at end ranges                         Additional Comments:  ROM tasks completed to prevent further contractures and ensure ability for positioning in WC, along with proper positioning to reduce further skin break down.  Good tolerance demonstrated.     Assessment: Patient tolerated session well.    PT Plan: continue per POC  Revisions made to plan of care: No    GOALS:   Multidisciplinary Problems       Physical Therapy Goals          Problem: Physical Therapy    Goal Priority Disciplines Outcome Goal Variances Interventions   Physical Therapy Goal     PT, PT/OT Ongoing,  Progressing     Description: Goals to be met by: Discharge     Patient will increase functional independence with mobility by performin.  Pt to receive PROM BLEs and UEs 5-6 d/wk, qd,  to prevent further contractures and ensure ability for positioning in WC, along with proper positioning to reduce further skin break down  2.  Pt to be assessed for appropriateness for out of bed to WC - awaiting wound care                       Skilled PT Minutes Provided: 23   Communication with Treatment Team:     Equipment recommendations:       At end of treatment, patient remained:   Up in chair     Up in wheelchair in room   x In bed    With alarm activated   x Bed rails up   x Call bell in reach    x Family/friends present    Restraints secured properly    In bathroom with CNA/RN notified    Nurse aware    In gym with therapist/tech    Other:

## 2023-08-25 NOTE — PROGRESS NOTES
Pharmacokinetic Assessment Follow Up: IV Vancomycin    Vancomycin serum concentration assessment(s):    The random level was drawn correctly and can be used to guide therapy at this time. The measurement is within the desired definitive target range of 10 to 20 mcg/mL.    Vancomycin Regimen Plan:    Change regimen to Vancomycin 1,000 mg IV every 12 hours with next serum trough concentration measured at 1 hour prior to 0900 dose on 8/27    Drug levels (last 3 results):  Recent Labs   Lab Result Units 08/22/23  1316 08/22/23  1708 08/23/23  1019 08/24/23  0343 08/25/23  0436   Vanc Lvl Random ug/ml  --   --  18.1 16.3 18.7   Vancomycin Trough ug/ml 23.4* 22.2*  --   --   --        Pharmacy will continue to follow and monitor vancomycin.    Please contact pharmacy at extension [phone] for questions regarding this assessment.    Thank you for the consult,   Errol Garcia       Patient brief summary:  Antonio Galvan II is a 57 y.o. male initiated on antimicrobial therapy with IV Vancomycin for treatment of skin & soft tissue infection    The patient's current regimen is vancomycin 1 gm q12hr    Drug Allergies:   Review of patient's allergies indicates:  No Known Allergies    Actual Body Weight:   106.6 kg    Renal Function:   Estimated Creatinine Clearance: 122 mL/min (based on SCr of 0.81 mg/dL).,     Dialysis Method (if applicable):  N/A    CBC (last 72 hours):  Recent Labs   Lab Result Units 08/24/23  0343 08/25/23  0436   WBC x10(3)/mcL 7.50 6.47   Hgb g/dL 8.5* 8.0*   Hct % 30.0* 27.6*   Platelet x10(3)/mcL 253 222   Mono % % 5.9 6.5   Eos % % 2.5 3.4   Basophil % % 0.1 0.3       Metabolic Panel (last 72 hours):  Recent Labs   Lab Result Units 08/23/23  1926 08/24/23  0343 08/25/23  0436   Sodium Level mmol/L  --  140 140   Potassium Level mmol/L  --  4.0 4.3   Chloride mmol/L  --  109* 110*   Carbon Dioxide mmol/L  --  24 23   Glucose Level mg/dL  --  267* 227*   Glucose, UA  100*  --   --    Blood Urea Nitrogen  mg/dL  --  23.9 24.0   Creatinine mg/dL  --  0.90 0.81   Albumin Level g/dL  --  1.7*  --    Bilirubin Total mg/dL  --  0.1  --    Alkaline Phosphatase unit/L  --  110  --    Aspartate Aminotransferase unit/L  --  3*  --    Alanine Aminotransferase unit/L  --  8  --    Magnesium Level mg/dL  --  2.00  --    Phosphorus Level mg/dL  --  2.9  --        Vancomycin Administrations:  vancomycin given in the last 96 hours                     vancomycin (VANCOCIN) 1,000 mg in dextrose 5 % (D5W) 250 mL IVPB (Vial-Mate) (mg) 1,000 mg New Bag 08/24/23 1655    vancomycin (VANCOCIN) 1,000 mg in dextrose 5 % (D5W) 250 mL IVPB (Vial-Mate) (mg) 1,000 mg New Bag 08/23/23 1855    vancomycin 1,250 mg in dextrose 5 % (D5W) 250 mL IVPB (Vial-Mate) (mg) 1,250 mg New Bag 08/21/23 1221                    Microbiologic Results:  Microbiology Results (last 7 days)       Procedure Component Value Units Date/Time    Urine culture [[id number]] Collected: 08/23/23 1926    Order Status: Sent Specimen: Urine Updated: 08/23/23 1940
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
## 2023-08-26 LAB
ANION GAP SERPL CALC-SCNC: 7 MEQ/L
BACTERIA UR CULT: ABNORMAL
BASOPHILS # BLD AUTO: 0.02 X10(3)/MCL
BASOPHILS NFR BLD AUTO: 0.3 %
BUN SERPL-MCNC: 21.9 MG/DL (ref 8.4–25.7)
CALCIUM SERPL-MCNC: 7.9 MG/DL (ref 8.4–10.2)
CHLORIDE SERPL-SCNC: 109 MMOL/L (ref 98–107)
CO2 SERPL-SCNC: 23 MMOL/L (ref 22–29)
CREAT SERPL-MCNC: 0.75 MG/DL (ref 0.73–1.18)
CREAT/UREA NIT SERPL: 29
EOSINOPHIL # BLD AUTO: 0.27 X10(3)/MCL (ref 0–0.9)
EOSINOPHIL NFR BLD AUTO: 4.2 %
ERYTHROCYTE [DISTWIDTH] IN BLOOD BY AUTOMATED COUNT: 17.3 % (ref 11.5–17)
GFR SERPLBLD CREATININE-BSD FMLA CKD-EPI: >60 MLS/MIN/1.73/M2
GLUCOSE SERPL-MCNC: 256 MG/DL (ref 74–100)
HCT VFR BLD AUTO: 27 % (ref 42–52)
HGB BLD-MCNC: 7.8 G/DL (ref 14–18)
IMM GRANULOCYTES # BLD AUTO: 0.04 X10(3)/MCL (ref 0–0.04)
IMM GRANULOCYTES NFR BLD AUTO: 0.6 %
LYMPHOCYTES # BLD AUTO: 1.98 X10(3)/MCL (ref 0.6–4.6)
LYMPHOCYTES NFR BLD AUTO: 30.6 %
MCH RBC QN AUTO: 25.7 PG (ref 27–31)
MCHC RBC AUTO-ENTMCNC: 28.9 G/DL (ref 33–36)
MCV RBC AUTO: 88.8 FL (ref 80–94)
MONOCYTES # BLD AUTO: 0.41 X10(3)/MCL (ref 0.1–1.3)
MONOCYTES NFR BLD AUTO: 6.3 %
NEUTROPHILS # BLD AUTO: 3.75 X10(3)/MCL (ref 2.1–9.2)
NEUTROPHILS NFR BLD AUTO: 58 %
PLATELET # BLD AUTO: 221 X10(3)/MCL (ref 130–400)
PMV BLD AUTO: 10.2 FL (ref 7.4–10.4)
POCT GLUCOSE: 257 MG/DL (ref 70–110)
POCT GLUCOSE: 372 MG/DL (ref 70–110)
POTASSIUM SERPL-SCNC: 4.1 MMOL/L (ref 3.5–5.1)
RBC # BLD AUTO: 3.04 X10(6)/MCL (ref 4.7–6.1)
SODIUM SERPL-SCNC: 139 MMOL/L (ref 136–145)
WBC # SPEC AUTO: 6.47 X10(3)/MCL (ref 4.5–11.5)

## 2023-08-26 PROCEDURE — 94640 AIRWAY INHALATION TREATMENT: CPT

## 2023-08-26 PROCEDURE — 80048 BASIC METABOLIC PNL TOTAL CA: CPT | Performed by: STUDENT IN AN ORGANIZED HEALTH CARE EDUCATION/TRAINING PROGRAM

## 2023-08-26 PROCEDURE — 27000207 HC ISOLATION

## 2023-08-26 PROCEDURE — 11000004 HC SNF PRIVATE

## 2023-08-26 PROCEDURE — 25000003 PHARM REV CODE 250: Performed by: STUDENT IN AN ORGANIZED HEALTH CARE EDUCATION/TRAINING PROGRAM

## 2023-08-26 PROCEDURE — 63600175 PHARM REV CODE 636 W HCPCS: Performed by: STUDENT IN AN ORGANIZED HEALTH CARE EDUCATION/TRAINING PROGRAM

## 2023-08-26 PROCEDURE — 25000242 PHARM REV CODE 250 ALT 637 W/ HCPCS: Performed by: STUDENT IN AN ORGANIZED HEALTH CARE EDUCATION/TRAINING PROGRAM

## 2023-08-26 PROCEDURE — 94760 N-INVAS EAR/PLS OXIMETRY 1: CPT

## 2023-08-26 PROCEDURE — 85025 COMPLETE CBC W/AUTO DIFF WBC: CPT | Performed by: STUDENT IN AN ORGANIZED HEALTH CARE EDUCATION/TRAINING PROGRAM

## 2023-08-26 RX ORDER — INSULIN ASPART 100 [IU]/ML
5 INJECTION, SOLUTION INTRAVENOUS; SUBCUTANEOUS
Status: DISCONTINUED | OUTPATIENT
Start: 2023-08-26 | End: 2023-08-28

## 2023-08-26 RX ADMIN — SODIUM CHLORIDE: 9 INJECTION, SOLUTION INTRAVENOUS at 02:08

## 2023-08-26 RX ADMIN — CEFEPIME 2 G: 2 INJECTION, POWDER, FOR SOLUTION INTRAVENOUS at 02:08

## 2023-08-26 RX ADMIN — SITAGLIPTIN 100 MG: 50 TABLET, FILM COATED ORAL at 11:08

## 2023-08-26 RX ADMIN — INSULIN ASPART 5 UNITS: 100 INJECTION, SOLUTION INTRAVENOUS; SUBCUTANEOUS at 05:08

## 2023-08-26 RX ADMIN — OXYCODONE HYDROCHLORIDE AND ACETAMINOPHEN 500 MG: 500 TABLET ORAL at 09:08

## 2023-08-26 RX ADMIN — HYDROCODONE BITARTRATE AND ACETAMINOPHEN 1 TABLET: 10; 325 TABLET ORAL at 10:08

## 2023-08-26 RX ADMIN — VANCOMYCIN HYDROCHLORIDE 1000 MG: 1 INJECTION, POWDER, LYOPHILIZED, FOR SOLUTION INTRAVENOUS at 08:08

## 2023-08-26 RX ADMIN — ENOXAPARIN SODIUM 105 MG: 120 INJECTION SUBCUTANEOUS at 11:08

## 2023-08-26 RX ADMIN — ESCITALOPRAM 5 MG: 5 TABLET, FILM COATED ORAL at 11:08

## 2023-08-26 RX ADMIN — INSULIN ASPART 5 UNITS: 100 INJECTION, SOLUTION INTRAVENOUS; SUBCUTANEOUS at 11:08

## 2023-08-26 RX ADMIN — CARVEDILOL 12.5 MG: 12.5 TABLET, FILM COATED ORAL at 05:08

## 2023-08-26 RX ADMIN — TRAZODONE HYDROCHLORIDE 50 MG: 50 TABLET ORAL at 08:08

## 2023-08-26 RX ADMIN — SODIUM CHLORIDE: 9 INJECTION, SOLUTION INTRAVENOUS at 09:08

## 2023-08-26 RX ADMIN — SODIUM CHLORIDE: 9 INJECTION, SOLUTION INTRAVENOUS at 08:08

## 2023-08-26 RX ADMIN — ZINC SULFATE 220 MG (50 MG) CAPSULE 220 MG: CAPSULE at 09:08

## 2023-08-26 RX ADMIN — INSULIN ASPART 4 UNITS: 100 INJECTION, SOLUTION INTRAVENOUS; SUBCUTANEOUS at 06:08

## 2023-08-26 RX ADMIN — BUDESONIDE INHALATION 0.5 MG: 0.5 SUSPENSION RESPIRATORY (INHALATION) at 07:08

## 2023-08-26 RX ADMIN — VANCOMYCIN HYDROCHLORIDE 1000 MG: 1 INJECTION, POWDER, LYOPHILIZED, FOR SOLUTION INTRAVENOUS at 09:08

## 2023-08-26 RX ADMIN — CEFEPIME 2 G: 2 INJECTION, POWDER, FOR SOLUTION INTRAVENOUS at 10:08

## 2023-08-26 RX ADMIN — INSULIN DETEMIR 20 UNITS: 100 INJECTION, SOLUTION SUBCUTANEOUS at 08:08

## 2023-08-26 RX ADMIN — MULTIPLE VITAMINS W/ MINERALS TAB 1 TABLET: TAB at 09:08

## 2023-08-26 RX ADMIN — MELATONIN TAB 3 MG 9 MG: 3 TAB at 08:08

## 2023-08-26 RX ADMIN — CEFEPIME 2 G: 2 INJECTION, POWDER, FOR SOLUTION INTRAVENOUS at 06:08

## 2023-08-26 RX ADMIN — FERROUS SULFATE TAB 325 MG (65 MG ELEMENTAL FE) 1 EACH: 325 (65 FE) TAB at 09:08

## 2023-08-26 RX ADMIN — SODIUM CHLORIDE: 9 INJECTION, SOLUTION INTRAVENOUS at 06:08

## 2023-08-26 RX ADMIN — INSULIN ASPART 3 UNITS: 100 INJECTION, SOLUTION INTRAVENOUS; SUBCUTANEOUS at 08:08

## 2023-08-26 RX ADMIN — FLUCONAZOLE 200 MG: 200 TABLET ORAL at 09:08

## 2023-08-26 RX ADMIN — ENOXAPARIN SODIUM 105 MG: 120 INJECTION SUBCUTANEOUS at 09:08

## 2023-08-26 RX ADMIN — CARVEDILOL 12.5 MG: 12.5 TABLET, FILM COATED ORAL at 09:08

## 2023-08-26 RX ADMIN — SODIUM CHLORIDE: 9 INJECTION, SOLUTION INTRAVENOUS at 10:08

## 2023-08-26 NOTE — PLAN OF CARE
Problem: Adult Inpatient Plan of Care  Goal: Plan of Care Review  Outcome: Ongoing, Progressing  Flowsheets (Taken 8/25/2023 0800)  Plan of Care Reviewed With: patient  Goal: Patient-Specific Goal (Individualized)  Outcome: Ongoing, Progressing  Flowsheets (Taken 8/25/2023 0800)  Anxieties, Fears or Concerns: None  Individualized Care Needs: IV ABX, Monitor BP and Blood Glucose, Wound Care     Problem: Impaired Wound Healing  Goal: Optimal Wound Healing  Outcome: Ongoing, Progressing  Intervention: Promote Wound Healing  Flowsheets (Taken 8/25/2023 0800)  Oral Nutrition Promotion: calorie-dense foods provided  Sleep/Rest Enhancement:   awakenings minimized   relaxation techniques promoted   room darkened   regular sleep/rest pattern promoted  Pain Management Interventions:   care clustered   position adjusted   pillow support provided   relaxation techniques promoted   quiet environment facilitated

## 2023-08-26 NOTE — NURSING
Late Entry:    Family/Patient requested that wound care be completed after bowel regimen on M-W-F.  Also, it has been difficult with SS insulin due to patient not always eating right away or not feeling like eating at all.  Patient informs staff when he will be eating to administer insulin.  Will request PRN sliding scale with MD on tomorrow.  Request passed on in report to Elina of patient's and family's request about wound care.  Patient also verbalized that his bowel regimen would not be done until Monday night since it was completed on yesterday.

## 2023-08-26 NOTE — NURSING
Patient is having issues with urine expelling from his penis.  I have irrigated his catheter twice today and offered to irrigate it again when his IV Abx is due.  His mother insisted that I was not irrigating his catheter correctly, and his father stood up to take a photo.  I informed him I was not giving permission to be in any photos and I stepped away and allowed the photo to be taken.  Patient's mother wanted me to disconnect the tubing and take an irrigation tray and push the solution directly into his bladder because that is what they do at home.  I informed her this was out of my scope  of practice and that I was performing the tasks correctly according to my scope of practice.  Patient's mother suggested that the catheter probably needed to be changed and she was going to her son's house to get a kit to have it changed.  I informed and cautioned her that I was unsure if staff was allowed to change suprapubic catheters, but I was going to find out and let her know.  She stated she was going get the kit and may come back in her son's van because his supplies were in his van.  Charge Nurse and House Supervisor made aware.
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
## 2023-08-26 NOTE — PLAN OF CARE
Problem: Adult Inpatient Plan of Care  Goal: Plan of Care Review  Outcome: Ongoing, Progressing  Flowsheets (Taken 8/26/2023 0744)  Plan of Care Reviewed With: patient  Goal: Patient-Specific Goal (Individualized)  Outcome: Ongoing, Progressing  Flowsheets (Taken 8/26/2023 0744)  Anxieties, Fears or Concerns: None expressed at this time  Individualized Care Needs: IV ABX, monitor blood glucose, Wound care  Goal: Absence of Hospital-Acquired Illness or Injury  Outcome: Ongoing, Progressing  Intervention: Prevent Skin Injury  Flowsheets (Taken 8/26/2023 0400)  Skin Protection:   adhesive use limited   incontinence pads utilized   tubing/devices free from skin contact  Intervention: Prevent and Manage VTE (Venous Thromboembolism) Risk  Flowsheets (Taken 8/26/2023 0744)  VTE Prevention/Management:   ambulation promoted   bleeding precations maintained   bleeding risk assessed   dorsiflexion/plantar flexion performed   fluids promoted  Intervention: Prevent Infection  Flowsheets (Taken 8/26/2023 0744)  Infection Prevention:   cohorting utilized   equipment surfaces disinfected   single patient room provided   rest/sleep promoted  Goal: Optimal Comfort and Wellbeing  Outcome: Ongoing, Progressing  Intervention: Monitor Pain and Promote Comfort  Flowsheets (Taken 8/26/2023 0744)  Pain Management Interventions:   care clustered   medication offered   pain management plan reviewed with patient/caregiver   pillow support provided   position adjusted   quiet environment facilitated   relaxation techniques promoted  Intervention: Provide Person-Centered Care  Flowsheets (Taken 8/26/2023 0744)  Trust Relationship/Rapport:   care explained   choices provided   emotional support provided   empathic listening provided   questions encouraged   questions answered   reassurance provided   thoughts/feelings acknowledged

## 2023-08-26 NOTE — PLAN OF CARE
Problem: Adult Inpatient Plan of Care  Goal: Plan of Care Review  Outcome: Ongoing, Progressing  Flowsheets (Taken 8/26/2023 0800)  Plan of Care Reviewed With: patient  Goal: Patient-Specific Goal (Individualized)  Outcome: Ongoing, Progressing  Flowsheets (Taken 8/26/2023 0800)  Anxieties, Fears or Concerns: None verbalized  Individualized Care Needs: Wound Care, IV Abx, Catheter Care     Problem: Impaired Wound Healing  Goal: Optimal Wound Healing  Outcome: Ongoing, Progressing  Intervention: Promote Wound Healing  Flowsheets (Taken 8/26/2023 0800)  Oral Nutrition Promotion:   calorie-dense foods provided   safe use of adaptive equipment encouraged  Sleep/Rest Enhancement:   awakenings minimized   relaxation techniques promoted   room darkened   regular sleep/rest pattern promoted  Pain Management Interventions:   pillow support provided   position adjusted   relaxation techniques promoted   care clustered   medication offered but refused

## 2023-08-27 LAB
BASOPHILS # BLD AUTO: 0.03 X10(3)/MCL
BASOPHILS NFR BLD AUTO: 0.4 %
EOSINOPHIL # BLD AUTO: 0.28 X10(3)/MCL (ref 0–0.9)
EOSINOPHIL NFR BLD AUTO: 4 %
ERYTHROCYTE [DISTWIDTH] IN BLOOD BY AUTOMATED COUNT: 17.2 % (ref 11.5–17)
HCT VFR BLD AUTO: 28.4 % (ref 42–52)
HGB BLD-MCNC: 8.2 G/DL (ref 14–18)
IMM GRANULOCYTES # BLD AUTO: 0.04 X10(3)/MCL (ref 0–0.04)
IMM GRANULOCYTES NFR BLD AUTO: 0.6 %
LYMPHOCYTES # BLD AUTO: 1.91 X10(3)/MCL (ref 0.6–4.6)
LYMPHOCYTES NFR BLD AUTO: 27.2 %
MCH RBC QN AUTO: 25.2 PG (ref 27–31)
MCHC RBC AUTO-ENTMCNC: 28.9 G/DL (ref 33–36)
MCV RBC AUTO: 87.1 FL (ref 80–94)
MONOCYTES # BLD AUTO: 0.38 X10(3)/MCL (ref 0.1–1.3)
MONOCYTES NFR BLD AUTO: 5.4 %
NEUTROPHILS # BLD AUTO: 4.38 X10(3)/MCL (ref 2.1–9.2)
NEUTROPHILS NFR BLD AUTO: 62.4 %
PLATELET # BLD AUTO: 206 X10(3)/MCL (ref 130–400)
PMV BLD AUTO: 9.9 FL (ref 7.4–10.4)
POCT GLUCOSE: 165 MG/DL (ref 70–110)
POCT GLUCOSE: 189 MG/DL (ref 70–110)
POCT GLUCOSE: 204 MG/DL (ref 70–110)
POCT GLUCOSE: 223 MG/DL (ref 70–110)
POCT GLUCOSE: 231 MG/DL (ref 70–110)
POCT GLUCOSE: 251 MG/DL (ref 70–110)
POCT GLUCOSE: 262 MG/DL (ref 70–110)
POCT GLUCOSE: 275 MG/DL (ref 70–110)
RBC # BLD AUTO: 3.26 X10(6)/MCL (ref 4.7–6.1)
VANCOMYCIN TROUGH SERPL-MCNC: 32.4 UG/ML (ref 15–20)
WBC # SPEC AUTO: 7.02 X10(3)/MCL (ref 4.5–11.5)

## 2023-08-27 PROCEDURE — 94640 AIRWAY INHALATION TREATMENT: CPT

## 2023-08-27 PROCEDURE — 25000003 PHARM REV CODE 250: Performed by: STUDENT IN AN ORGANIZED HEALTH CARE EDUCATION/TRAINING PROGRAM

## 2023-08-27 PROCEDURE — 94760 N-INVAS EAR/PLS OXIMETRY 1: CPT

## 2023-08-27 PROCEDURE — 80202 ASSAY OF VANCOMYCIN: CPT | Performed by: STUDENT IN AN ORGANIZED HEALTH CARE EDUCATION/TRAINING PROGRAM

## 2023-08-27 PROCEDURE — 63600175 PHARM REV CODE 636 W HCPCS: Performed by: STUDENT IN AN ORGANIZED HEALTH CARE EDUCATION/TRAINING PROGRAM

## 2023-08-27 PROCEDURE — 25000242 PHARM REV CODE 250 ALT 637 W/ HCPCS: Performed by: STUDENT IN AN ORGANIZED HEALTH CARE EDUCATION/TRAINING PROGRAM

## 2023-08-27 PROCEDURE — 27000207 HC ISOLATION

## 2023-08-27 PROCEDURE — 85025 COMPLETE CBC W/AUTO DIFF WBC: CPT | Performed by: STUDENT IN AN ORGANIZED HEALTH CARE EDUCATION/TRAINING PROGRAM

## 2023-08-27 PROCEDURE — 11000004 HC SNF PRIVATE

## 2023-08-27 RX ORDER — ZOLPIDEM TARTRATE 5 MG/1
5 TABLET ORAL NIGHTLY PRN
Status: DISCONTINUED | OUTPATIENT
Start: 2023-08-27 | End: 2023-09-02

## 2023-08-27 RX ADMIN — CEFEPIME 2 G: 2 INJECTION, POWDER, FOR SOLUTION INTRAVENOUS at 06:08

## 2023-08-27 RX ADMIN — CEFEPIME 2 G: 2 INJECTION, POWDER, FOR SOLUTION INTRAVENOUS at 10:08

## 2023-08-27 RX ADMIN — HYDROCODONE BITARTRATE AND ACETAMINOPHEN 1 TABLET: 10; 325 TABLET ORAL at 10:08

## 2023-08-27 RX ADMIN — ENOXAPARIN SODIUM 105 MG: 120 INJECTION SUBCUTANEOUS at 10:08

## 2023-08-27 RX ADMIN — SODIUM CHLORIDE: 9 INJECTION, SOLUTION INTRAVENOUS at 02:08

## 2023-08-27 RX ADMIN — INSULIN ASPART 5 UNITS: 100 INJECTION, SOLUTION INTRAVENOUS; SUBCUTANEOUS at 08:08

## 2023-08-27 RX ADMIN — SODIUM CHLORIDE: 9 INJECTION, SOLUTION INTRAVENOUS at 10:08

## 2023-08-27 RX ADMIN — MULTIPLE VITAMINS W/ MINERALS TAB 1 TABLET: TAB at 08:08

## 2023-08-27 RX ADMIN — TRAZODONE HYDROCHLORIDE 50 MG: 50 TABLET ORAL at 10:08

## 2023-08-27 RX ADMIN — BUDESONIDE INHALATION 0.5 MG: 0.5 SUSPENSION RESPIRATORY (INHALATION) at 07:08

## 2023-08-27 RX ADMIN — SITAGLIPTIN 100 MG: 50 TABLET, FILM COATED ORAL at 08:08

## 2023-08-27 RX ADMIN — ENOXAPARIN SODIUM 105 MG: 120 INJECTION SUBCUTANEOUS at 11:08

## 2023-08-27 RX ADMIN — INSULIN ASPART 5 UNITS: 100 INJECTION, SOLUTION INTRAVENOUS; SUBCUTANEOUS at 04:08

## 2023-08-27 RX ADMIN — ESCITALOPRAM 5 MG: 5 TABLET, FILM COATED ORAL at 08:08

## 2023-08-27 RX ADMIN — INSULIN ASPART 4 UNITS: 100 INJECTION, SOLUTION INTRAVENOUS; SUBCUTANEOUS at 06:08

## 2023-08-27 RX ADMIN — INSULIN ASPART 5 UNITS: 100 INJECTION, SOLUTION INTRAVENOUS; SUBCUTANEOUS at 11:08

## 2023-08-27 RX ADMIN — CEFEPIME 2 G: 2 INJECTION, POWDER, FOR SOLUTION INTRAVENOUS at 02:08

## 2023-08-27 RX ADMIN — CARVEDILOL 12.5 MG: 12.5 TABLET, FILM COATED ORAL at 04:08

## 2023-08-27 RX ADMIN — FERROUS SULFATE TAB 325 MG (65 MG ELEMENTAL FE) 1 EACH: 325 (65 FE) TAB at 08:08

## 2023-08-27 RX ADMIN — FLUCONAZOLE 200 MG: 200 TABLET ORAL at 08:08

## 2023-08-27 RX ADMIN — OXYCODONE HYDROCHLORIDE AND ACETAMINOPHEN 500 MG: 500 TABLET ORAL at 08:08

## 2023-08-27 RX ADMIN — SODIUM CHLORIDE: 9 INJECTION, SOLUTION INTRAVENOUS at 06:08

## 2023-08-27 RX ADMIN — INSULIN DETEMIR 20 UNITS: 100 INJECTION, SOLUTION SUBCUTANEOUS at 10:08

## 2023-08-27 RX ADMIN — CARVEDILOL 12.5 MG: 12.5 TABLET, FILM COATED ORAL at 08:08

## 2023-08-27 RX ADMIN — MELATONIN TAB 3 MG 9 MG: 3 TAB at 10:08

## 2023-08-27 NOTE — PLAN OF CARE
Problem: Adult Inpatient Plan of Care  Goal: Plan of Care Review  Outcome: Ongoing, Progressing  Flowsheets (Taken 8/26/2023 2349)  Plan of Care Reviewed With:   patient   caregiver  Goal: Patient-Specific Goal (Individualized)  Outcome: Ongoing, Progressing  Flowsheets (Taken 8/26/2023 2349)  Anxieties, Fears or Concerns: None verbalized at this time  Individualized Care Needs: Wound care, IV Abx, Catheter insertion site care, monitor blood glucose, administer supplemental insulin as ordered  Goal: Absence of Hospital-Acquired Illness or Injury  Outcome: Ongoing, Progressing  Intervention: Prevent Skin Injury  Flowsheets (Taken 8/26/2023 2338)  Body Position:   side-lying   right   turned  Skin Protection:   adhesive use limited   incontinence pads utilized   silicone foam dressing in place   tubing/devices free from skin contact  Intervention: Prevent and Manage VTE (Venous Thromboembolism) Risk  Flowsheets (Taken 8/26/2023 2349)  Activity Management: Rolling - L1  VTE Prevention/Management:   bleeding precations maintained   bleeding risk assessed   fluids promoted   ROM (passive) performed  Intervention: Prevent Infection  Flowsheets (Taken 8/26/2023 2349)  Infection Prevention:   cohorting utilized   environmental surveillance performed   equipment surfaces disinfected   hand hygiene promoted   rest/sleep promoted   single patient room provided  Goal: Optimal Comfort and Wellbeing  Outcome: Ongoing, Progressing  Intervention: Monitor Pain and Promote Comfort  Flowsheets (Taken 8/26/2023 2349)  Pain Management Interventions:   care clustered   medication offered   pain management plan reviewed with patient/caregiver   pillow support provided   position adjusted   quiet environment facilitated   relaxation techniques promoted  Intervention: Provide Person-Centered Care  Flowsheets (Taken 8/26/2023 2349)  Trust Relationship/Rapport:   care explained   choices provided   emotional support provided   empathic  listening provided   questions answered   questions encouraged   reassurance provided   thoughts/feelings acknowledged  Goal: Readiness for Transition of Care  Outcome: Ongoing, Progressing
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
## 2023-08-27 NOTE — PLAN OF CARE
[facility]  Discharge Reassessment    Primary Care Provider: Jennifer Fernando NP    Expected Discharge Date:     Reassessment (most recent)       Discharge Reassessment - 08/27/23 1402          Discharge Reassessment    Assessment Type Discharge Planning Reassessment     Did the patient's condition or plan change since previous assessment? No     Discharge Plan discussed with: Parent(s)     Name(s) and Number(s) Judy mother      Communicated SADE with patient/caregiver Date not available/Unable to determine     Discharge Plan A Home with family;Home Health     DME Needed Upon Discharge  none     Transition of Care Barriers None     Why the patient remains in the hospital Requires continued medical care        Post-Acute Status    Post-Acute Authorization Home Health     Home Health Status Pending medical clearance/testing     Hospital Resources/Appts/Education Provided Provided patient/caregiver with written discharge plan information     Discharge Delays None known at this time

## 2023-08-27 NOTE — PROGRESS NOTES
Pharmacokinetic Assessment Follow Up: IV Vancomycin    Vancomycin serum concentration assessment(s):    The trough level was drawn correctly and can be used to guide therapy at this time. The measurement is above the desired definitive target range of 10 to 20 mcg/mL.    Vancomycin Regimen Plan:    Discontinue the scheduled vancomycin regimen and re-dose when the random level is less than 20 mcg/mL, next level to be drawn at 0500 on 8/28.    Drug levels (last 3 results):  Recent Labs   Lab Result Units 08/25/23  0436 08/27/23  0815   Vanc Lvl Random ug/ml 18.7  --    Vancomycin Trough ug/ml  --  32.4*       Pharmacy will continue to follow and monitor vancomycin.    Please contact pharmacy at extension [phone] for questions regarding this assessment.    Thank you for the consult,   Soo Gutierres, PharmD       Patient brief summary:  Antonio Galvan II is a 57 y.o. male initiated on antimicrobial therapy with IV Vancomycin for treatment of skin & soft tissue infection    The patient's current regimen is hold and redose when serum random level <20    Drug Allergies:   Review of patient's allergies indicates:  No Known Allergies    Actual Body Weight:   106.6kg    Renal Function:   Estimated Creatinine Clearance: 131.8 mL/min (based on SCr of 0.75 mg/dL).,     Dialysis Method (if applicable):  N/A    CBC (last 72 hours):  Recent Labs   Lab Result Units 08/25/23  0436 08/26/23  0522 08/27/23  0424   WBC x10(3)/mcL 6.47 6.47 7.02   Hgb g/dL 8.0* 7.8* 8.2*   Hct % 27.6* 27.0* 28.4*   Platelet x10(3)/mcL 222 221 206   Mono % % 6.5 6.3 5.4   Eos % % 3.4 4.2 4.0   Basophil % % 0.3 0.3 0.4       Metabolic Panel (last 72 hours):  Recent Labs   Lab Result Units 08/25/23  0436 08/26/23  0522   Sodium Level mmol/L 140 139   Potassium Level mmol/L 4.3 4.1   Chloride mmol/L 110* 109*   Carbon Dioxide mmol/L 23 23   Glucose Level mg/dL 227* 256*   Blood Urea Nitrogen mg/dL 24.0 21.9   Creatinine mg/dL 0.81 0.75       Vancomycin  Administrations:  vancomycin given in the last 96 hours                     vancomycin (VANCOCIN) 1,000 mg in dextrose 5 % (D5W) 250 mL IVPB (Vial-Mate) (mg) 1,000 mg New Bag 08/26/23 2042     1,000 mg New Bag  0921     1,000 mg New Bag 08/25/23 2218    vancomycin (VANCOCIN) 1,000 mg in dextrose 5 % (D5W) 250 mL IVPB (Vial-Mate) (mg) 1,000 mg New Bag 08/24/23 1655    vancomycin (VANCOCIN) 1,000 mg in dextrose 5 % (D5W) 250 mL IVPB (Vial-Mate) (mg) 1,000 mg New Bag 08/23/23 1855                    Microbiologic Results:  Microbiology Results (last 7 days)       Procedure Component Value Units Date/Time    Urine culture [[id number]]  (Abnormal) Collected: 08/23/23 1926    Order Status: Completed Specimen: Urine Updated: 08/26/23 0908     Urine Culture >/= 100,000 colonies/ml Candida albicans

## 2023-08-28 LAB
HCT VFR BLD AUTO: 27.8 % (ref 42–52)
HGB BLD-MCNC: 7.9 G/DL (ref 14–18)
POCT GLUCOSE: 166 MG/DL (ref 70–110)
POCT GLUCOSE: 175 MG/DL (ref 70–110)
POCT GLUCOSE: 241 MG/DL (ref 70–110)
POCT GLUCOSE: 289 MG/DL (ref 70–110)
POCT GLUCOSE: 301 MG/DL (ref 70–110)
VANCOMYCIN SERPL-MCNC: 22 UG/ML (ref 15–20)

## 2023-08-28 PROCEDURE — 27000207 HC ISOLATION

## 2023-08-28 PROCEDURE — 11000004 HC SNF PRIVATE

## 2023-08-28 PROCEDURE — 25000003 PHARM REV CODE 250: Performed by: STUDENT IN AN ORGANIZED HEALTH CARE EDUCATION/TRAINING PROGRAM

## 2023-08-28 PROCEDURE — 85014 HEMATOCRIT: CPT | Performed by: STUDENT IN AN ORGANIZED HEALTH CARE EDUCATION/TRAINING PROGRAM

## 2023-08-28 PROCEDURE — 94640 AIRWAY INHALATION TREATMENT: CPT

## 2023-08-28 PROCEDURE — 63600175 PHARM REV CODE 636 W HCPCS: Performed by: STUDENT IN AN ORGANIZED HEALTH CARE EDUCATION/TRAINING PROGRAM

## 2023-08-28 PROCEDURE — 94760 N-INVAS EAR/PLS OXIMETRY 1: CPT

## 2023-08-28 PROCEDURE — 97110 THERAPEUTIC EXERCISES: CPT

## 2023-08-28 PROCEDURE — 80202 ASSAY OF VANCOMYCIN: CPT | Performed by: STUDENT IN AN ORGANIZED HEALTH CARE EDUCATION/TRAINING PROGRAM

## 2023-08-28 PROCEDURE — 25000242 PHARM REV CODE 250 ALT 637 W/ HCPCS: Performed by: STUDENT IN AN ORGANIZED HEALTH CARE EDUCATION/TRAINING PROGRAM

## 2023-08-28 RX ORDER — ENOXAPARIN SODIUM 150 MG/ML
110 INJECTION SUBCUTANEOUS EVERY 12 HOURS
Status: DISCONTINUED | OUTPATIENT
Start: 2023-08-28 | End: 2023-08-31

## 2023-08-28 RX ORDER — BUDESONIDE 0.5 MG/2ML
1 INHALANT ORAL DAILY PRN
Status: DISCONTINUED | OUTPATIENT
Start: 2023-08-28 | End: 2023-09-27 | Stop reason: HOSPADM

## 2023-08-28 RX ORDER — INSULIN ASPART 100 [IU]/ML
7 INJECTION, SOLUTION INTRAVENOUS; SUBCUTANEOUS
Status: DISCONTINUED | OUTPATIENT
Start: 2023-08-28 | End: 2023-09-21

## 2023-08-28 RX ADMIN — TRAZODONE HYDROCHLORIDE 50 MG: 50 TABLET ORAL at 08:08

## 2023-08-28 RX ADMIN — SODIUM CHLORIDE: 9 INJECTION, SOLUTION INTRAVENOUS at 12:08

## 2023-08-28 RX ADMIN — INSULIN DETEMIR 20 UNITS: 100 INJECTION, SOLUTION SUBCUTANEOUS at 08:08

## 2023-08-28 RX ADMIN — INSULIN ASPART 7 UNITS: 100 INJECTION, SOLUTION INTRAVENOUS; SUBCUTANEOUS at 12:08

## 2023-08-28 RX ADMIN — INSULIN ASPART 7 UNITS: 100 INJECTION, SOLUTION INTRAVENOUS; SUBCUTANEOUS at 05:08

## 2023-08-28 RX ADMIN — SODIUM CHLORIDE: 9 INJECTION, SOLUTION INTRAVENOUS at 09:08

## 2023-08-28 RX ADMIN — CEFEPIME 2 G: 2 INJECTION, POWDER, FOR SOLUTION INTRAVENOUS at 06:08

## 2023-08-28 RX ADMIN — Medication 1 ENEMA: at 06:08

## 2023-08-28 RX ADMIN — CARVEDILOL 12.5 MG: 12.5 TABLET, FILM COATED ORAL at 09:08

## 2023-08-28 RX ADMIN — SITAGLIPTIN 100 MG: 50 TABLET, FILM COATED ORAL at 09:08

## 2023-08-28 RX ADMIN — CEFEPIME 2 G: 2 INJECTION, POWDER, FOR SOLUTION INTRAVENOUS at 09:08

## 2023-08-28 RX ADMIN — ZOLPIDEM TARTRATE 5 MG: 5 TABLET ORAL at 12:08

## 2023-08-28 RX ADMIN — FLUCONAZOLE 200 MG: 200 TABLET ORAL at 09:08

## 2023-08-28 RX ADMIN — IPRATROPIUM BROMIDE AND ALBUTEROL SULFATE 3 ML: .5; 3 SOLUTION RESPIRATORY (INHALATION) at 07:08

## 2023-08-28 RX ADMIN — LIDOCAINE HYDROCHLORIDE 0.5 ML: 20 JELLY TOPICAL at 07:08

## 2023-08-28 RX ADMIN — VANCOMYCIN HYDROCHLORIDE 1000 MG: 1 INJECTION, POWDER, LYOPHILIZED, FOR SOLUTION INTRAVENOUS at 12:08

## 2023-08-28 RX ADMIN — ESCITALOPRAM 5 MG: 5 TABLET, FILM COATED ORAL at 09:08

## 2023-08-28 RX ADMIN — INSULIN ASPART 5 UNITS: 100 INJECTION, SOLUTION INTRAVENOUS; SUBCUTANEOUS at 09:08

## 2023-08-28 RX ADMIN — BUDESONIDE INHALATION 0.5 MG: 0.5 SUSPENSION RESPIRATORY (INHALATION) at 07:08

## 2023-08-28 RX ADMIN — CEFEPIME 2 G: 2 INJECTION, POWDER, FOR SOLUTION INTRAVENOUS at 02:08

## 2023-08-28 RX ADMIN — SODIUM CHLORIDE: 9 INJECTION, SOLUTION INTRAVENOUS at 06:08

## 2023-08-28 RX ADMIN — FERROUS SULFATE TAB 325 MG (65 MG ELEMENTAL FE) 1 EACH: 325 (65 FE) TAB at 09:08

## 2023-08-28 RX ADMIN — OXYCODONE HYDROCHLORIDE AND ACETAMINOPHEN 500 MG: 500 TABLET ORAL at 09:08

## 2023-08-28 RX ADMIN — INSULIN ASPART 4 UNITS: 100 INJECTION, SOLUTION INTRAVENOUS; SUBCUTANEOUS at 05:08

## 2023-08-28 RX ADMIN — ENOXAPARIN SODIUM 105 MG: 120 INJECTION SUBCUTANEOUS at 02:08

## 2023-08-28 RX ADMIN — MULTIPLE VITAMINS W/ MINERALS TAB 1 TABLET: TAB at 09:08

## 2023-08-28 RX ADMIN — CARVEDILOL 12.5 MG: 12.5 TABLET, FILM COATED ORAL at 05:08

## 2023-08-28 RX ADMIN — SODIUM CHLORIDE: 9 INJECTION, SOLUTION INTRAVENOUS at 02:08

## 2023-08-28 RX ADMIN — MELATONIN TAB 3 MG 9 MG: 3 TAB at 08:08

## 2023-08-28 NOTE — PLAN OF CARE
Problem: Adult Inpatient Plan of Care  Goal: Plan of Care Review  Outcome: Ongoing, Progressing  Flowsheets (Taken 8/27/2023 1929)  Plan of Care Reviewed With:   patient   mother  Goal: Patient-Specific Goal (Individualized)  Outcome: Ongoing, Progressing  Flowsheets (Taken 8/27/2023 1929)  Anxieties, Fears or Concerns: mother concerned with his bowel regimen, scheduled for tomorrow am  Individualized Care Needs: wound care, IV abt, catheter card, CBG's, insulin admin  Goal: Absence of Hospital-Acquired Illness or Injury  Outcome: Ongoing, Progressing  Intervention: Identify and Manage Fall Risk  Flowsheets (Taken 8/27/2023 1929)  Safety Promotion/Fall Prevention:   assistive device/personal item within reach   family to remain at bedside   room near unit station   instructed to call staff for mobility  Intervention: Prevent Skin Injury  Flowsheets (Taken 8/27/2023 1929)  Body Position:   turned   sitting up in bed  Skin Protection:   incontinence pads utilized   tubing/devices free from skin contact   silicone foam dressing in place  Intervention: Prevent and Manage VTE (Venous Thromboembolism) Risk  Flowsheets (Taken 8/27/2023 1929)  Activity Management: Rolling - L1  VTE Prevention/Management:   bleeding precations maintained   bleeding risk assessed  Intervention: Prevent Infection  Flowsheets (Taken 8/27/2023 1929)  Infection Prevention:   cohorting utilized   hand hygiene promoted   single patient room provided  Goal: Optimal Comfort and Wellbeing  Outcome: Ongoing, Progressing  Intervention: Monitor Pain and Promote Comfort  Flowsheets (Taken 8/27/2023 1929)  Pain Management Interventions:   care clustered   medication offered but refused   quiet environment facilitated   relaxation techniques promoted   position adjusted   pillow support provided  Intervention: Provide Person-Centered Care  Flowsheets (Taken 8/27/2023 1929)  Trust Relationship/Rapport:   care explained   choices provided   reassurance  provided  Goal: Readiness for Transition of Care  Outcome: Ongoing, Progressing     Problem: Diabetes Comorbidity  Goal: Blood Glucose Level Within Targeted Range  Outcome: Ongoing, Progressing  Intervention: Monitor and Manage Glycemia  Flowsheets (Taken 8/27/2023 1929)  Glycemic Management:   blood glucose monitored   supplemental insulin given     Problem: Adjustment to Illness (Sepsis/Septic Shock)  Goal: Optimal Coping  Outcome: Ongoing, Progressing     Problem: Bleeding (Sepsis/Septic Shock)  Goal: Absence of Bleeding  Outcome: Ongoing, Progressing     Problem: Glycemic Control Impaired (Sepsis/Septic Shock)  Goal: Blood Glucose Level Within Desired Range  Outcome: Ongoing, Progressing     Problem: Infection Progression (Sepsis/Septic Shock)  Goal: Absence of Infection Signs and Symptoms  Outcome: Ongoing, Progressing     Problem: Nutrition Impaired (Sepsis/Septic Shock)  Goal: Optimal Nutrition Intake  Outcome: Ongoing, Progressing     Problem: Infection  Goal: Absence of Infection Signs and Symptoms  Outcome: Ongoing, Progressing  Intervention: Prevent or Manage Infection  Flowsheets (Taken 8/27/2023 1929)  Fever Reduction/Comfort Measures: lightweight bedding  Infection Management: aseptic technique maintained  Isolation Precautions:   contact   precautions maintained     Problem: Impaired Wound Healing  Goal: Optimal Wound Healing  Outcome: Ongoing, Progressing  Intervention: Promote Wound Healing  Flowsheets (Taken 8/27/2023 1929)  Oral Nutrition Promotion: (scar)   calorie-dense foods provided   calorie-dense liquids provided  Activity Management: Rolling - L1  Pain Management Interventions:   care clustered   medication offered but refused   quiet environment facilitated   relaxation techniques promoted   position adjusted   pillow support provided     Problem: Skin Injury Risk Increased  Goal: Skin Health and Integrity  Outcome: Ongoing, Progressing     Problem: Fall Injury Risk  Goal: Absence of Fall  and Fall-Related Injury  Outcome: Ongoing, Progressing

## 2023-08-28 NOTE — NURSING
Cbg on pt performed. 175, pt reqired 2 units. Held insuline. Pt was scheduled for short-term insulin at 7:15.  Enema was due at 0600. Pt stated he wanted to wait for mother. Ana Luisa and AUSTIN assisted the mother with administering the enema.
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
## 2023-08-28 NOTE — PT/OT/SLP PROGRESS
Physical Therapy Treatment Note           Name: Antonio Galvan II    : 1965 (57 y.o.)  MRN: [id number]           TREATMENT SUMMARY AND RECOMMENDATIONS:    PT Received On: 23  PT Start Time: 930     PT Stop Time: 954  PT Total Time (min): 24 min     Subjective Assessment:   No complaints  Lethargic   x Awake, alert, cooperative  Uncooperative    Agitated  c/o pain    Appropriate  c/o fatigue    Confused x Treated at bedside     Emotionally labile  Treated in gym/dept.    Impulsive  Other:    Flat affect       Therapy Precautions:    Cognitive deficits  Spinal precautions    Collar - hard  Sternal precautions    Collar - soft   TLSO    Fall risk  LSO    Hip precautions - posterior  Knee immobilizer    Hip precautions - anterior  WBAT    Impaired communication  Partial weightbearing    Oxygen  TTWB    PEG tube  NWB    Visual deficits x Other: perineural catheter, PICC line, pressure relief boots     Hearing deficits  x Quadriplegia        Treatment Objectives:     Mobility Training:   Assist level Comments    Bed mobility     Transfer     Gait     Sit to stand transitions     Sitting balance     Standing balance      Wheelchair mobility     Car transfer     Other:          Therapeutic Exercise:   Exercise Sets Reps Comments   PROM to B UE and LE, all joints and digits   All functional planes to tolerance with sustained hold at end ranges                         Additional Comments:  ROM tasks completed to prevent further contractures and ensure ability for positioning in WC, along with proper positioning to reduce further skin break down.  Good tolerance demonstrated. Pt and family were asking about sitting EOB, but would care was consulted and due to wound status supine<>sit shearing forces are not allowed at this time. PT to keep checking in on the progress.     Assessment: Patient tolerated session well.    PT Plan: continue per POC  Revisions made to plan of care: No    GOALS:    Multidisciplinary Problems       Physical Therapy Goals          Problem: Physical Therapy    Goal Priority Disciplines Outcome Goal Variances Interventions   Physical Therapy Goal     PT, PT/OT Ongoing, Progressing     Description: Goals to be met by: Discharge     Patient will increase functional independence with mobility by performin.  Pt to receive PROM BLEs and UEs 5-6 d/wk, qd,  to prevent further contractures and ensure ability for positioning in WC, along with proper positioning to reduce further skin break down  2.  Pt to be assessed for appropriateness for out of bed to WC - awaiting wound care                       Skilled PT Minutes Provided: 23   Communication with Treatment Team:     Equipment recommendations:       At end of treatment, patient remained:   Up in chair     Up in wheelchair in room   x In bed    With alarm activated   x Bed rails up   x Call bell in reach    x Family/friends present    Restraints secured properly    In bathroom with CNA/RN notified    Nurse aware    In gym with therapist/tech    Other:

## 2023-08-28 NOTE — PROGRESS NOTES
Pharmacokinetic Assessment Follow Up: IV Vancomycin    Vancomycin serum concentration assessment(s):    The random level was drawn correctly and can be used to guide therapy at this time. The measurement is above the desired definitive target range of 10 to 20 mcg/mL.    Vancomycin Regimen Plan:    Will give vancomycin 1,000 mg x1 dose @ 1200 then obtain a random level @ 1100 on 8/29.    Drug levels (last 3 results):  Recent Labs   Lab Result Units 08/27/23  0815 08/28/23  0509   Vanc Lvl Random ug/ml  --  22.0*   Vancomycin Trough ug/ml 32.4*  --        Pharmacy will continue to follow and monitor vancomycin.    Please contact pharmacy at extension [phone] for questions regarding this assessment.    Thank you for the consult,   Errol Garcia       Patient brief summary:  Antonio Galvan II is a 57 y.o. male initiated on antimicrobial therapy with IV Vancomycin for treatment of skin & soft tissue infection    The patient's current regimen is vancomycin 1gm x1 dose    Drug Allergies:   Review of patient's allergies indicates:  No Known Allergies    Actual Body Weight:   106.6 kg    Renal Function:   Estimated Creatinine Clearance: 131.8 mL/min (based on SCr of 0.75 mg/dL).,     Dialysis Method (if applicable):  N/A    CBC (last 72 hours):  Recent Labs   Lab Result Units 08/26/23  0522 08/27/23  0424   WBC x10(3)/mcL 6.47 7.02   Hgb g/dL 7.8* 8.2*   Hct % 27.0* 28.4*   Platelet x10(3)/mcL 221 206   Mono % % 6.3 5.4   Eos % % 4.2 4.0   Basophil % % 0.3 0.4       Metabolic Panel (last 72 hours):  Recent Labs   Lab Result Units 08/26/23  0522   Sodium Level mmol/L 139   Potassium Level mmol/L 4.1   Chloride mmol/L 109*   Carbon Dioxide mmol/L 23   Glucose Level mg/dL 256*   Blood Urea Nitrogen mg/dL 21.9   Creatinine mg/dL 0.75       Vancomycin Administrations:  vancomycin given in the last 96 hours                     vancomycin (VANCOCIN) 1,000 mg in dextrose 5 % (D5W) 250 mL IVPB (Vial-Mate) (mg) 1,000 mg New Bag  08/26/23 2042     1,000 mg New Bag  0921     1,000 mg New Bag 08/25/23 2218    vancomycin (VANCOCIN) 1,000 mg in dextrose 5 % (D5W) 250 mL IVPB (Vial-Mate) (mg) 1,000 mg New Bag 08/24/23 1655                    Microbiologic Results:  Microbiology Results (last 7 days)       Procedure Component Value Units Date/Time    Urine culture [[id number]]  (Abnormal) Collected: 08/23/23 1926    Order Status: Completed Specimen: Urine Updated: 08/26/23 0908     Urine Culture >/= 100,000 colonies/ml Candida albicans

## 2023-08-28 NOTE — PLAN OF CARE
Problem: Diabetes Comorbidity  Goal: Blood Glucose Level Within Targeted Range  Outcome: Ongoing, Progressing  Intervention: Monitor and Manage Glycemia  Flowsheets (Taken 8/28/2023 0120)  Glycemic Management: blood glucose monitored     Problem: Infection Progression (Sepsis/Septic Shock)  Goal: Absence of Infection Signs and Symptoms  Outcome: Ongoing, Progressing  Intervention: Initiate Sepsis Management  Flowsheets (Taken 8/28/2023 0120)  Infection Prevention: rest/sleep promoted  Isolation Precautions:   contact   precautions maintained  Intervention: Promote Stabilization  Flowsheets (Taken 8/28/2023 0120)  Fever Reduction/Comfort Measures:   lightweight clothing   lightweight bedding  Fluid/Electrolyte Management: fluids provided  Intervention: Promote Recovery  Flowsheets (Taken 8/28/2023 0120)  Sleep/Rest Enhancement:   awakenings minimized   noise level reduced   regular sleep/rest pattern promoted   room darkened  Airway/Ventilation Support: comfort measures provided  Activity Management: Patient unable to perform activities

## 2023-08-29 LAB
POCT GLUCOSE: 136 MG/DL (ref 70–110)
POCT GLUCOSE: 213 MG/DL (ref 70–110)
POCT GLUCOSE: 217 MG/DL (ref 70–110)
POCT GLUCOSE: 249 MG/DL (ref 70–110)
VANCOMYCIN TROUGH SERPL-MCNC: 21.3 UG/ML (ref 15–20)

## 2023-08-29 PROCEDURE — 27000207 HC ISOLATION

## 2023-08-29 PROCEDURE — 97110 THERAPEUTIC EXERCISES: CPT

## 2023-08-29 PROCEDURE — 80202 ASSAY OF VANCOMYCIN: CPT | Performed by: STUDENT IN AN ORGANIZED HEALTH CARE EDUCATION/TRAINING PROGRAM

## 2023-08-29 PROCEDURE — 94760 N-INVAS EAR/PLS OXIMETRY 1: CPT

## 2023-08-29 PROCEDURE — 99900035 HC TECH TIME PER 15 MIN (STAT)

## 2023-08-29 PROCEDURE — 11000004 HC SNF PRIVATE

## 2023-08-29 PROCEDURE — 63600175 PHARM REV CODE 636 W HCPCS: Performed by: STUDENT IN AN ORGANIZED HEALTH CARE EDUCATION/TRAINING PROGRAM

## 2023-08-29 PROCEDURE — 25000003 PHARM REV CODE 250: Performed by: STUDENT IN AN ORGANIZED HEALTH CARE EDUCATION/TRAINING PROGRAM

## 2023-08-29 RX ADMIN — CEFEPIME 2 G: 2 INJECTION, POWDER, FOR SOLUTION INTRAVENOUS at 09:08

## 2023-08-29 RX ADMIN — MELATONIN TAB 3 MG 9 MG: 3 TAB at 09:08

## 2023-08-29 RX ADMIN — SODIUM CHLORIDE: 9 INJECTION, SOLUTION INTRAVENOUS at 02:08

## 2023-08-29 RX ADMIN — OXYCODONE HYDROCHLORIDE AND ACETAMINOPHEN 500 MG: 500 TABLET ORAL at 09:08

## 2023-08-29 RX ADMIN — INSULIN ASPART 7 UNITS: 100 INJECTION, SOLUTION INTRAVENOUS; SUBCUTANEOUS at 04:08

## 2023-08-29 RX ADMIN — MULTIPLE VITAMINS W/ MINERALS TAB 1 TABLET: TAB at 09:08

## 2023-08-29 RX ADMIN — FERROUS SULFATE TAB 325 MG (65 MG ELEMENTAL FE) 1 EACH: 325 (65 FE) TAB at 09:08

## 2023-08-29 RX ADMIN — CARVEDILOL 12.5 MG: 12.5 TABLET, FILM COATED ORAL at 04:08

## 2023-08-29 RX ADMIN — CARVEDILOL 12.5 MG: 12.5 TABLET, FILM COATED ORAL at 08:08

## 2023-08-29 RX ADMIN — CEFEPIME 2 G: 2 INJECTION, POWDER, FOR SOLUTION INTRAVENOUS at 05:08

## 2023-08-29 RX ADMIN — VANCOMYCIN HYDROCHLORIDE 750 MG: 750 INJECTION, POWDER, LYOPHILIZED, FOR SOLUTION INTRAVENOUS at 01:08

## 2023-08-29 RX ADMIN — SITAGLIPTIN 100 MG: 50 TABLET, FILM COATED ORAL at 09:08

## 2023-08-29 RX ADMIN — INSULIN DETEMIR 20 UNITS: 100 INJECTION, SOLUTION SUBCUTANEOUS at 09:08

## 2023-08-29 RX ADMIN — ENOXAPARIN SODIUM 105 MG: 120 INJECTION SUBCUTANEOUS at 05:08

## 2023-08-29 RX ADMIN — INSULIN ASPART 7 UNITS: 100 INJECTION, SOLUTION INTRAVENOUS; SUBCUTANEOUS at 08:08

## 2023-08-29 RX ADMIN — ZOLPIDEM TARTRATE 5 MG: 5 TABLET ORAL at 12:08

## 2023-08-29 RX ADMIN — INSULIN ASPART 4 UNITS: 100 INJECTION, SOLUTION INTRAVENOUS; SUBCUTANEOUS at 04:08

## 2023-08-29 RX ADMIN — SODIUM CHLORIDE: 9 INJECTION, SOLUTION INTRAVENOUS at 09:08

## 2023-08-29 RX ADMIN — INSULIN ASPART 7 UNITS: 100 INJECTION, SOLUTION INTRAVENOUS; SUBCUTANEOUS at 11:08

## 2023-08-29 RX ADMIN — TRAZODONE HYDROCHLORIDE 50 MG: 50 TABLET ORAL at 09:08

## 2023-08-29 RX ADMIN — INSULIN ASPART 4 UNITS: 100 INJECTION, SOLUTION INTRAVENOUS; SUBCUTANEOUS at 11:08

## 2023-08-29 RX ADMIN — SODIUM CHLORIDE: 9 INJECTION, SOLUTION INTRAVENOUS at 05:08

## 2023-08-29 RX ADMIN — ESCITALOPRAM 5 MG: 5 TABLET, FILM COATED ORAL at 09:08

## 2023-08-29 RX ADMIN — ENOXAPARIN SODIUM 105 MG: 120 INJECTION SUBCUTANEOUS at 04:08

## 2023-08-29 RX ADMIN — FLUCONAZOLE 200 MG: 200 TABLET ORAL at 09:08

## 2023-08-29 RX ADMIN — CEFEPIME 2 G: 2 INJECTION, POWDER, FOR SOLUTION INTRAVENOUS at 02:08

## 2023-08-29 RX ADMIN — SODIUM CHLORIDE: 9 INJECTION, SOLUTION INTRAVENOUS at 01:08

## 2023-08-29 NOTE — PLAN OF CARE
Problem: Adult Inpatient Plan of Care  Goal: Plan of Care Review  Outcome: Ongoing, Progressing  Flowsheets (Taken 8/29/2023 0201)  Plan of Care Reviewed With: patient  Goal: Patient-Specific Goal (Individualized)  Outcome: Ongoing, Progressing  Flowsheets (Taken 8/29/2023 0201)  Anxieties, Fears or Concerns: none stated  Individualized Care Needs: iv abx and catheter care until end of shift 7a  Goal: Absence of Hospital-Acquired Illness or Injury  Outcome: Ongoing, Progressing  Intervention: Identify and Manage Fall Risk  Flowsheets (Taken 8/29/2023 0201)  Safety Promotion/Fall Prevention:   assistive device/personal item within reach   bed alarm set   Fall Risk reviewed with patient/family   side rails raised x 3  Intervention: Prevent Skin Injury  Flowsheets (Taken 8/29/2023 0201)  Body Position:   turned   right  Skin Protection: adhesive use limited  Intervention: Prevent and Manage VTE (Venous Thromboembolism) Risk  Flowsheets (Taken 8/29/2023 0201)  Activity Management: Rolling - L1  VTE Prevention/Management:   bleeding precations maintained   bleeding risk assessed  Range of Motion: ROM (range of motion) performed  Intervention: Prevent Infection  Flowsheets (Taken 8/29/2023 0201)  Infection Prevention:   environmental surveillance performed   personal protective equipment utilized   rest/sleep promoted   equipment surfaces disinfected   single patient room provided   hand hygiene promoted

## 2023-08-29 NOTE — PT/OT/SLP PROGRESS
Name: Antonio Dumontfam PATTERSON    : 1965 (57 y.o.)  MRN: [id number]           Patient Unavailable      Patient unable to be seen at this time secondary to: Pt and caregiver were soundly sleeping upon attempt at 1114. PT to return at 1300
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
## 2023-08-29 NOTE — PROGRESS NOTES
Inpatient Nutrition Evaluation    Admit Date: 8/23/2023   Total duration of encounter: 6 days    Nutrition Recommendation/Prescription     Continue diabetic diet 2. Continue boost glucose control TID and Cole 1 pk BID (used in-house wound nutrition supplement arginaid).     Nutrition Assessment     Chart Review    Reason Seen: continuous nutrition monitoring, length of stay, and follow-up    Malnutrition Screening Tool Results   Have you recently lost weight without trying?: No  Have you been eating poorly because of a decreased appetite?: No   MST Score: 0     Diagnosis:  Open wound of left hip    Relevant Medical History: A-fib  Date Unknown  Cardiac arrest   Date Unknown  Chronic skin ulcer  Date Unknown  DM (diabetes mellitus)  Date Unknown  Infected decubitus ulcer  Date Unknown  Lymphedema of leg  Date Unknown  Neurogenic bladder  Date Unknown  Obesity, unspecified  Date Unknown  Pressure ulcer of heel  Date Unknown  Pressure ulcer of right foot, stage 3  Date Unknown  Pressure ulcer of right ischium  Date Unknown  Quadriplegia  Date Unknown  Quadriplegic spinal paralysis    Nutrition-Related Medications: ascorbic acid, cefepime, ferrous sulfate, fluconazole, insulin aspart, insulin detemir, multivitamin, senna-docusate, vancomycin    Nutrition-Related Labs:     Latest Reference Range & Units 08/29/23 11:49   POCT Glucose 70 - 110 mg/dL 249 (H)   (H): Data is abnormally high  Diet Order: Diet diabetic  Oral Supplement Order: Boost Glucose Control and Cole  Appetite/Oral Intake: fair/  Factors Affecting Nutritional Intake: decreased appetite  Food/Lutheran/Cultural Preferences:  cereal and milk  Food Allergies: none reported    Skin Integrity: intact  Wound(s):      Altered Skin Integrity 05/06/22 1140 Right Heel  Full thickness tissue loss. Subcutaneous fat may be visible but bone, tendon or muscle are not exposed-Tissue loss description: Full thickness       Altered Skin Integrity 01/12/23 1530 Sacral  "spine Full thickness tissue loss with exposed bone, tendon, or muscle. Often includes undermining and tunneling. May extend into muscle and/or supporting structures.-Tissue loss description: Full thickness       Altered Skin Integrity 08/01/23 1535 Left anterior Ankle Other (comment) Full thickness tissue loss. Base is covered by slough and/or eschar in the wound bed-Tissue loss description: Full thickness       Altered Skin Integrity 08/01/23 1534 Left posterior Ankle Full thickness tissue loss. Base is covered by slough and/or eschar in the wound bed-Tissue loss description: Full thickness     Comments    Made rounds at lunch time. Pt more alert today. Pt and family reports. Pt very sleepy yesterday. Pt was given ambien, family reports. Discussed food preferences. Marked menus. Pt likes cereal. Pt drinking wound healing supplement. Will monitor     Anthropometrics    Height: 5' 7.99" (172.7 cm) Height Method: Stated  Last Weight: 106.6 kg (235 lb 0.2 oz) (08/23/23 1605) Weight Method: Bed Scale  BMI (Calculated): 35.7  BMI Classification: obese grade II (BMI 35-39.9)        Ideal Body Weight (IBW), Male: 153.94 lb     % Ideal Body Weight, Male (lb): 152.66 %                          Usual Weight Provided By: unable to obtain usual weight    Wt Readings from Last 5 Encounters:   08/23/23 106.6 kg (235 lb 0.2 oz)   08/16/23 106.4 kg (234 lb 9.6 oz)   08/15/23 113.4 kg (250 lb)   08/14/23 108.9 kg (240 lb)   06/23/23 100.7 kg (222 lb 0.1 oz)     Weight Change(s) Since Admission:  Admit Weight: 106.6 kg (235 lb 0.2 oz) (08/23/23 1605)  No new wt recorded. Notified clinical nurse manager.     Patient Education    Not applicable.    Monitoring & Evaluation     Dietitian will monitor food and beverage intake, weight, weight change, electrolyte/renal panel, glucose/endocrine profile, and gastrointestinal profile.  Nutrition Risk/Follow-Up: low (follow-up in 5-7 days)  Patients assigned 'low nutrition risk' status do not " qualify for a full nutritional assessment but will be monitored and re-evaluated in a 5-7 day time period. Please consult if re-evaluation needed sooner.

## 2023-08-29 NOTE — PLAN OF CARE
Problem: Adult Inpatient Plan of Care  Goal: Plan of Care Review  Outcome: Ongoing, Progressing  Flowsheets (Taken 8/28/2023 2052)  Plan of Care Reviewed With: patient  Goal: Patient-Specific Goal (Individualized)  Outcome: Ongoing, Progressing  Flowsheets (Taken 8/28/2023 2052)  Anxieties, Fears or Concerns: bowel regimen concerns  Individualized Care Needs: admin bowel regimen, wound care, IV abt, catheter care, monitor vs  Goal: Absence of Hospital-Acquired Illness or Injury  Outcome: Ongoing, Progressing  Intervention: Identify and Manage Fall Risk  Flowsheets (Taken 8/28/2023 2052)  Safety Promotion/Fall Prevention:   assistive device/personal item within reach   room near unit station   instructed to call staff for mobility   medications reviewed   family to remain at bedside  Intervention: Prevent Skin Injury  Flowsheets (Taken 8/28/2023 2052)  Body Position:   turned   sitting up in bed  Skin Protection:   drying agents applied   incontinence pads utilized   tubing/devices free from skin contact  Intervention: Prevent and Manage VTE (Venous Thromboembolism) Risk  Flowsheets (Taken 8/28/2023 2052)  Activity Management: Rolling - L1  VTE Prevention/Management:   bleeding precations maintained   bleeding risk assessed  Range of Motion: ROM (range of motion) performed  Intervention: Prevent Infection  Flowsheets (Taken 8/28/2023 2052)  Infection Prevention:   cohorting utilized   hand hygiene promoted   equipment surfaces disinfected   environmental surveillance performed   personal protective equipment utilized   single patient room provided  Goal: Optimal Comfort and Wellbeing  Outcome: Ongoing, Progressing  Intervention: Monitor Pain and Promote Comfort  Flowsheets (Taken 8/28/2023 2052)  Pain Management Interventions:   care clustered   pillow support provided   position adjusted  Intervention: Provide Person-Centered Care  Flowsheets (Taken 8/28/2023 2052)  Trust Relationship/Rapport:   care explained    choices provided  Goal: Readiness for Transition of Care  Outcome: Ongoing, Progressing     Problem: Diabetes Comorbidity  Goal: Blood Glucose Level Within Targeted Range  Outcome: Ongoing, Progressing  Intervention: Monitor and Manage Glycemia  Flowsheets (Taken 8/28/2023 2052)  Glycemic Management:   blood glucose monitored   supplemental insulin given     Problem: Adjustment to Illness (Sepsis/Septic Shock)  Goal: Optimal Coping  Outcome: Ongoing, Progressing     Problem: Bleeding (Sepsis/Septic Shock)  Goal: Absence of Bleeding  Outcome: Ongoing, Progressing     Problem: Glycemic Control Impaired (Sepsis/Septic Shock)  Goal: Blood Glucose Level Within Desired Range  Outcome: Ongoing, Progressing  Intervention: Optimize Glycemic Control  Flowsheets (Taken 8/28/2023 2052)  Glycemic Management:   blood glucose monitored   supplemental insulin given     Problem: Infection Progression (Sepsis/Septic Shock)  Goal: Absence of Infection Signs and Symptoms  Outcome: Ongoing, Progressing     Problem: Nutrition Impaired (Sepsis/Septic Shock)  Goal: Optimal Nutrition Intake  Outcome: Ongoing, Progressing     Problem: Infection  Goal: Absence of Infection Signs and Symptoms  Outcome: Ongoing, Progressing     Problem: Impaired Wound Healing  Goal: Optimal Wound Healing  Outcome: Ongoing, Progressing     Problem: Skin Injury Risk Increased  Goal: Skin Health and Integrity  Outcome: Ongoing, Progressing     Problem: Fall Injury Risk  Goal: Absence of Fall and Fall-Related Injury  Outcome: Ongoing, Progressing

## 2023-08-30 LAB
HCT VFR BLD AUTO: 27.2 % (ref 42–52)
HGB BLD-MCNC: 7.7 G/DL (ref 14–18)
POCT GLUCOSE: 189 MG/DL (ref 70–110)
POCT GLUCOSE: 201 MG/DL (ref 70–110)
POCT GLUCOSE: 203 MG/DL (ref 70–110)
POCT GLUCOSE: 292 MG/DL (ref 70–110)
POCT GLUCOSE: 293 MG/DL (ref 70–110)
VANCOMYCIN TROUGH SERPL-MCNC: 20.2 UG/ML (ref 15–20)

## 2023-08-30 PROCEDURE — 94760 N-INVAS EAR/PLS OXIMETRY 1: CPT

## 2023-08-30 PROCEDURE — 11000004 HC SNF PRIVATE

## 2023-08-30 PROCEDURE — 99900035 HC TECH TIME PER 15 MIN (STAT)

## 2023-08-30 PROCEDURE — 63600175 PHARM REV CODE 636 W HCPCS: Performed by: STUDENT IN AN ORGANIZED HEALTH CARE EDUCATION/TRAINING PROGRAM

## 2023-08-30 PROCEDURE — 97110 THERAPEUTIC EXERCISES: CPT

## 2023-08-30 PROCEDURE — 25000003 PHARM REV CODE 250: Performed by: STUDENT IN AN ORGANIZED HEALTH CARE EDUCATION/TRAINING PROGRAM

## 2023-08-30 PROCEDURE — 97530 THERAPEUTIC ACTIVITIES: CPT

## 2023-08-30 PROCEDURE — 80202 ASSAY OF VANCOMYCIN: CPT | Performed by: STUDENT IN AN ORGANIZED HEALTH CARE EDUCATION/TRAINING PROGRAM

## 2023-08-30 PROCEDURE — 85014 HEMATOCRIT: CPT | Performed by: STUDENT IN AN ORGANIZED HEALTH CARE EDUCATION/TRAINING PROGRAM

## 2023-08-30 PROCEDURE — 27000207 HC ISOLATION

## 2023-08-30 RX ADMIN — ESCITALOPRAM 5 MG: 5 TABLET, FILM COATED ORAL at 09:08

## 2023-08-30 RX ADMIN — HYDROCODONE BITARTRATE AND ACETAMINOPHEN 1 TABLET: 10; 325 TABLET ORAL at 01:08

## 2023-08-30 RX ADMIN — INSULIN ASPART 2 UNITS: 100 INJECTION, SOLUTION INTRAVENOUS; SUBCUTANEOUS at 11:08

## 2023-08-30 RX ADMIN — INSULIN ASPART 7 UNITS: 100 INJECTION, SOLUTION INTRAVENOUS; SUBCUTANEOUS at 06:08

## 2023-08-30 RX ADMIN — MELATONIN TAB 3 MG 9 MG: 3 TAB at 09:08

## 2023-08-30 RX ADMIN — SODIUM CHLORIDE: 9 INJECTION, SOLUTION INTRAVENOUS at 05:08

## 2023-08-30 RX ADMIN — SODIUM CHLORIDE: 9 INJECTION, SOLUTION INTRAVENOUS at 02:08

## 2023-08-30 RX ADMIN — SODIUM CHLORIDE: 9 INJECTION, SOLUTION INTRAVENOUS at 09:08

## 2023-08-30 RX ADMIN — SITAGLIPTIN 100 MG: 50 TABLET, FILM COATED ORAL at 09:08

## 2023-08-30 RX ADMIN — LIDOCAINE HYDROCHLORIDE 10 ML: 20 JELLY TOPICAL at 06:08

## 2023-08-30 RX ADMIN — FERROUS SULFATE TAB 325 MG (65 MG ELEMENTAL FE) 1 EACH: 325 (65 FE) TAB at 09:08

## 2023-08-30 RX ADMIN — CEFEPIME 2 G: 2 INJECTION, POWDER, FOR SOLUTION INTRAVENOUS at 02:08

## 2023-08-30 RX ADMIN — ZOLPIDEM TARTRATE 5 MG: 5 TABLET ORAL at 11:08

## 2023-08-30 RX ADMIN — INSULIN ASPART 4 UNITS: 100 INJECTION, SOLUTION INTRAVENOUS; SUBCUTANEOUS at 07:08

## 2023-08-30 RX ADMIN — Medication 1 ENEMA: at 08:08

## 2023-08-30 RX ADMIN — CARVEDILOL 12.5 MG: 12.5 TABLET, FILM COATED ORAL at 06:08

## 2023-08-30 RX ADMIN — MULTIPLE VITAMINS W/ MINERALS TAB 1 TABLET: TAB at 09:08

## 2023-08-30 RX ADMIN — ENOXAPARIN SODIUM 105 MG: 120 INJECTION SUBCUTANEOUS at 05:08

## 2023-08-30 RX ADMIN — VANCOMYCIN HYDROCHLORIDE 750 MG: 750 INJECTION, POWDER, LYOPHILIZED, FOR SOLUTION INTRAVENOUS at 02:08

## 2023-08-30 RX ADMIN — FLUCONAZOLE 200 MG: 200 TABLET ORAL at 09:08

## 2023-08-30 RX ADMIN — OXYCODONE HYDROCHLORIDE AND ACETAMINOPHEN 500 MG: 500 TABLET ORAL at 09:08

## 2023-08-30 RX ADMIN — CEFEPIME 2 G: 2 INJECTION, POWDER, FOR SOLUTION INTRAVENOUS at 09:08

## 2023-08-30 RX ADMIN — INSULIN DETEMIR 20 UNITS: 100 INJECTION, SOLUTION SUBCUTANEOUS at 09:08

## 2023-08-30 RX ADMIN — CARVEDILOL 12.5 MG: 12.5 TABLET, FILM COATED ORAL at 07:08

## 2023-08-30 RX ADMIN — INSULIN ASPART 7 UNITS: 100 INJECTION, SOLUTION INTRAVENOUS; SUBCUTANEOUS at 07:08

## 2023-08-30 RX ADMIN — CEFEPIME 2 G: 2 INJECTION, POWDER, FOR SOLUTION INTRAVENOUS at 05:08

## 2023-08-30 RX ADMIN — INSULIN ASPART 7 UNITS: 100 INJECTION, SOLUTION INTRAVENOUS; SUBCUTANEOUS at 11:08

## 2023-08-30 RX ADMIN — INSULIN ASPART 6 UNITS: 100 INJECTION, SOLUTION INTRAVENOUS; SUBCUTANEOUS at 06:08

## 2023-08-30 RX ADMIN — TRAZODONE HYDROCHLORIDE 50 MG: 50 TABLET ORAL at 09:08

## 2023-08-30 NOTE — PT/OT/SLP PROGRESS
Physical Therapy Treatment Note           Name: Antonio Galvan II    : 1965 (57 y.o.)  MRN: [id number]           TREATMENT SUMMARY AND RECOMMENDATIONS:    PT Received On: 23  PT Start Time: 945   PT Stop Time:   PT Total Time (min): 38 min       Subjective Assessment:   No complaints  Lethargic   x Awake, alert, cooperative  Uncooperative    Agitated  c/o pain    Appropriate  c/o fatigue    Confused x Treated at bedside     Emotionally labile  Treated in gym/dept.    Impulsive  Other:    Flat affect       Therapy Precautions:    Cognitive deficits  Spinal precautions    Collar - hard  Sternal precautions    Collar - soft   TLSO    Fall risk  LSO    Hip precautions - posterior  Knee immobilizer    Hip precautions - anterior  WBAT    Impaired communication  Partial weightbearing    Oxygen  TTWB    PEG tube  NWB    Visual deficits x Other: perineural catheter, PICC line, pressure relief boots     Hearing deficits  x Quadriplegia        Treatment Objectives:     Mobility Training:   Assist level Comments    Bed mobility Total  Rolling side to side and scooting x 2 each to assisted mother after bowel program and to change bedding   Transfer     Gait     Sit to stand transitions     Sitting balance     Standing balance      Wheelchair mobility     Car transfer     Other:          Therapeutic Exercise:   Exercise Sets Reps Comments   PROM to B UE and LE, all joints and digits   All functional planes to tolerance with sustained hold at end ranges                         Additional Comments:  ROM tasks completed to prevent further contractures and ensure ability for positioning in WC, along with proper positioning to reduce further skin break down.  Excessive drainage noted from L hip would so nursing was called in to see. CNA assisted PT in changing bedding due to drainage. Nursing unable to complete dressing changes at this time, stating she would be back later.      Assessment: Patient  tolerated session well.    PT Plan: continue per POC  Revisions made to plan of care: No    GOALS:   Multidisciplinary Problems       Physical Therapy Goals          Problem: Physical Therapy    Goal Priority Disciplines Outcome Goal Variances Interventions   Physical Therapy Goal     PT, PT/OT Ongoing, Progressing     Description: Goals to be met by: Discharge     Patient will increase functional independence with mobility by performin.  Pt to receive PROM BLEs and UEs 5-6 d/wk, qd,  to prevent further contractures and ensure ability for positioning in WC, along with proper positioning to reduce further skin break down  2.  Pt to be assessed for appropriateness for out of bed to WC - awaiting wound care                       Skilled PT Minutes Provided: 38   Communication with Treatment Team:     Equipment recommendations:       At end of treatment, patient remained:   Up in chair     Up in wheelchair in room   x In bed    With alarm activated   x Bed rails up   x Call bell in reach    x Family/friends present    Restraints secured properly    In bathroom with CNA/RN notified    Nurse aware    In gym with therapist/tech    Other:

## 2023-08-30 NOTE — PLAN OF CARE
[facility]  Discharge Reassessment    Primary Care Provider: Jennifer Fernando NP    Expected Discharge Date:     Reassessment (most recent)       Discharge Reassessment - 08/30/23 1817          Discharge Reassessment    Assessment Type Discharge Planning Reassessment     Did the patient's condition or plan change since previous assessment? No     Discharge Plan discussed with: Patient     Name(s) and Number(s) Judy mother      Communicated SADE with patient/caregiver Date not available/Unable to determine     Discharge Plan A Home Health;Home with family     DME Needed Upon Discharge  none     Transition of Care Barriers None     Why the patient remains in the hospital Requires continued medical care        Post-Acute Status    Post-Acute Authorization Home Health     Home Health Status Pending medical clearance/testing     Hospital Resources/Appts/Education Provided Provided patient/caregiver with written discharge plan information     Discharge Delays None known at this time

## 2023-08-30 NOTE — PT/OT/SLP PROGRESS
Physical Therapy Treatment Note           Name: Antonio Galvan II    : 1965 (57 y.o.)  MRN: [id number]           TREATMENT SUMMARY AND RECOMMENDATIONS:    PT Received On: 23  PT Start Time: 1300    PT Stop Time: 1324  PT Total Time (min): 24 min       Subjective Assessment:   No complaints  Lethargic   x Awake, alert, cooperative  Uncooperative    Agitated  c/o pain    Appropriate  c/o fatigue    Confused x Treated at bedside     Emotionally labile  Treated in gym/dept.    Impulsive  Other:    Flat affect       Therapy Precautions:    Cognitive deficits  Spinal precautions    Collar - hard  Sternal precautions    Collar - soft   TLSO    Fall risk  LSO    Hip precautions - posterior  Knee immobilizer    Hip precautions - anterior  WBAT    Impaired communication  Partial weightbearing    Oxygen  TTWB    PEG tube  NWB    Visual deficits x Other: perineural catheter, PICC line, pressure relief boots     Hearing deficits  x Quadriplegia        Treatment Objectives:     Mobility Training:   Assist level Comments    Bed mobility     Transfer     Gait     Sit to stand transitions     Sitting balance     Standing balance      Wheelchair mobility     Car transfer     Other:          Therapeutic Exercise:   Exercise Sets Reps Comments   PROM to B UE and LE, all joints and digits   All functional planes to tolerance with sustained hold at end ranges                         Additional Comments:  ROM tasks completed to prevent further contractures and ensure ability for positioning in WC, along with proper positioning to reduce further skin break down.  Good tolerance demonstrated.   Assessment: Patient tolerated session well.    PT Plan: continue per POC  Revisions made to plan of care: No    GOALS:   Multidisciplinary Problems       Physical Therapy Goals          Problem: Physical Therapy    Goal Priority Disciplines Outcome Goal Variances Interventions   Physical Therapy Goal     PT, PT/OT Ongoing,  Progressing     Description: Goals to be met by: Discharge     Patient will increase functional independence with mobility by performin.  Pt to receive PROM BLEs and UEs 5-6 d/wk, qd,  to prevent further contractures and ensure ability for positioning in WC, along with proper positioning to reduce further skin break down  2.  Pt to be assessed for appropriateness for out of bed to WC - awaiting wound care                       Skilled PT Minutes Provided: 23   Communication with Treatment Team:     Equipment recommendations:       At end of treatment, patient remained:   Up in chair     Up in wheelchair in room   x In bed    With alarm activated   x Bed rails up   x Call bell in reach    x Family/friends present    Restraints secured properly    In bathroom with CNA/RN notified    Nurse aware    In gym with therapist/tech    Other:
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
## 2023-08-30 NOTE — PLAN OF CARE
Problem: Adult Inpatient Plan of Care  Goal: Plan of Care Review  Outcome: Ongoing, Progressing  Flowsheets (Taken 8/29/2023 2018)  Plan of Care Reviewed With:   patient   mother  Goal: Patient-Specific Goal (Individualized)  Outcome: Ongoing, Progressing  Flowsheets (Taken 8/29/2023 2018)  Anxieties, Fears or Concerns: feeling tired this morning  Individualized Care Needs: IV abt, vanc trough, wound care, monitor heart rate  Goal: Absence of Hospital-Acquired Illness or Injury  Outcome: Ongoing, Progressing  Intervention: Identify and Manage Fall Risk  Flowsheets (Taken 8/29/2023 2018)  Safety Promotion/Fall Prevention:   assistive device/personal item within reach   medications reviewed   family to remain at bedside   instructed to call staff for mobility   room near unit station  Intervention: Prevent Skin Injury  Flowsheets (Taken 8/29/2023 2018)  Body Position:   sitting up in bed   turned  Skin Protection:   adhesive use limited   drying agents applied   incontinence pads utilized   tubing/devices free from skin contact   silicone foam dressing in place  Intervention: Prevent and Manage VTE (Venous Thromboembolism) Risk  Flowsheets (Taken 8/29/2023 2018)  Activity Management: Rolling - L1  VTE Prevention/Management:   bleeding precations maintained   bleeding risk assessed  Range of Motion: ROM (range of motion) performed  Intervention: Prevent Infection  Flowsheets (Taken 8/29/2023 2018)  Infection Prevention:   cohorting utilized   single patient room provided   personal protective equipment utilized   equipment surfaces disinfected   environmental surveillance performed  Goal: Optimal Comfort and Wellbeing  Outcome: Ongoing, Progressing  Intervention: Monitor Pain and Promote Comfort  Flowsheets (Taken 8/29/2023 2018)  Pain Management Interventions:   care clustered   position adjusted   pillow support provided   relaxation techniques promoted  Intervention: Provide Person-Centered Care  Flowsheets (Taken  8/29/2023 2018)  Trust Relationship/Rapport:   care explained   choices provided   questions encouraged  Goal: Readiness for Transition of Care  Outcome: Ongoing, Progressing     Problem: Diabetes Comorbidity  Goal: Blood Glucose Level Within Targeted Range  Outcome: Ongoing, Progressing     Problem: Adjustment to Illness (Sepsis/Septic Shock)  Goal: Optimal Coping  Outcome: Ongoing, Progressing     Problem: Bleeding (Sepsis/Septic Shock)  Goal: Absence of Bleeding  Outcome: Ongoing, Progressing     Problem: Glycemic Control Impaired (Sepsis/Septic Shock)  Goal: Blood Glucose Level Within Desired Range  Outcome: Ongoing, Progressing     Problem: Infection Progression (Sepsis/Septic Shock)  Goal: Absence of Infection Signs and Symptoms  Outcome: Ongoing, Progressing     Problem: Nutrition Impaired (Sepsis/Septic Shock)  Goal: Optimal Nutrition Intake  Outcome: Ongoing, Progressing     Problem: Infection  Goal: Absence of Infection Signs and Symptoms  Outcome: Ongoing, Progressing     Problem: Impaired Wound Healing  Goal: Optimal Wound Healing  Outcome: Ongoing, Progressing     Problem: Skin Injury Risk Increased  Goal: Skin Health and Integrity  Outcome: Ongoing, Progressing     Problem: Fall Injury Risk  Goal: Absence of Fall and Fall-Related Injury  Outcome: Ongoing, Progressing

## 2023-08-30 NOTE — PLAN OF CARE
Weekly Staffing Report      Date Admitted: 8/23/2023 :   Staffing Date: 8/30/2023     Patient Active Problem List   Diagnosis    Cardiac arrest    Uncontrolled type 2 diabetes mellitus with hyperglycemia    Atrial flutter    Severe sepsis    Acute hypoxemic respiratory failure    Constipation    Abnormal urinalysis    Obstructive sleep apnea syndrome    Acute deep vein thrombosis (DVT) of brachial vein of right upper extremity    Quadriplegia    Intolerance of continuous positive airway pressure (CPAP) ventilation    Complex sleep apnea syndrome    Open wound of left hip    Pressure injury of right ischium, stage 4    Osteomyelitis    Sacral decubitus ulcer, stage II    Chronic blood loss anemia          Team Members Present:       Nursing Present     Yes [x]    No []    Physical Therapy Present    Yes [x]    No []    Occupational Therapy Present     Yes [x]    No []    Speech Therapy Present    Yes []    No []    NA []    Dietary Present    Yes [x]    No []        Physician Present   [] Dm Acevedo    [x] Thairy Reyes    [] SHRAVAN Soares    [] SIDRA Donohue     [] SHAWN Avila      Family Present    [] Adult Children    [] Spouse    [] POA    [] Friend/ Caregiver    [] Other       Interdisciplinary Meeting Notes:  Mother present for staffing. PT/OT ROM. Appetite good. Medically- discussed 6weeks of iv abx last day being 09/26/2023. Also discussed wound care and healing. Discussed possibility of outpatient iv therapy explained that outpatient would not be able to administer abx 3 times a day which is what he is on. All questions answered at this time.                 Please see Documented PT/OT/ST, Dietary, Nursing, and Physician notes for detailed treatment information.

## 2023-08-31 LAB
ANION GAP SERPL CALC-SCNC: 8 MEQ/L
BASOPHILS # BLD AUTO: 0.02 X10(3)/MCL
BASOPHILS NFR BLD AUTO: 0.3 %
BUN SERPL-MCNC: 35 MG/DL (ref 8.4–25.7)
CALCIUM SERPL-MCNC: 8.2 MG/DL (ref 8.4–10.2)
CHLORIDE SERPL-SCNC: 108 MMOL/L (ref 98–107)
CO2 SERPL-SCNC: 24 MMOL/L (ref 22–29)
CREAT SERPL-MCNC: 1.18 MG/DL (ref 0.73–1.18)
CREAT/UREA NIT SERPL: 30
EOSINOPHIL # BLD AUTO: 0.22 X10(3)/MCL (ref 0–0.9)
EOSINOPHIL NFR BLD AUTO: 3.2 %
ERYTHROCYTE [DISTWIDTH] IN BLOOD BY AUTOMATED COUNT: 18.2 % (ref 11.5–17)
GFR SERPLBLD CREATININE-BSD FMLA CKD-EPI: >60 MLS/MIN/1.73/M2
GLUCOSE SERPL-MCNC: 191 MG/DL (ref 74–100)
HCT VFR BLD AUTO: 28.5 % (ref 42–52)
HGB BLD-MCNC: 8.2 G/DL (ref 14–18)
IMM GRANULOCYTES # BLD AUTO: 0.03 X10(3)/MCL (ref 0–0.04)
IMM GRANULOCYTES NFR BLD AUTO: 0.4 %
LYMPHOCYTES # BLD AUTO: 1.69 X10(3)/MCL (ref 0.6–4.6)
LYMPHOCYTES NFR BLD AUTO: 24.6 %
MAGNESIUM SERPL-MCNC: 2 MG/DL (ref 1.6–2.6)
MCH RBC QN AUTO: 25.5 PG (ref 27–31)
MCHC RBC AUTO-ENTMCNC: 28.8 G/DL (ref 33–36)
MCV RBC AUTO: 88.5 FL (ref 80–94)
MONOCYTES # BLD AUTO: 0.48 X10(3)/MCL (ref 0.1–1.3)
MONOCYTES NFR BLD AUTO: 7 %
NEUTROPHILS # BLD AUTO: 4.44 X10(3)/MCL (ref 2.1–9.2)
NEUTROPHILS NFR BLD AUTO: 64.5 %
PLATELET # BLD AUTO: 208 X10(3)/MCL (ref 130–400)
PMV BLD AUTO: 10.6 FL (ref 7.4–10.4)
POCT GLUCOSE: 166 MG/DL (ref 70–110)
POCT GLUCOSE: 169 MG/DL (ref 70–110)
POCT GLUCOSE: 180 MG/DL (ref 70–110)
POTASSIUM SERPL-SCNC: 4.5 MMOL/L (ref 3.5–5.1)
RBC # BLD AUTO: 3.22 X10(6)/MCL (ref 4.7–6.1)
SODIUM SERPL-SCNC: 140 MMOL/L (ref 136–145)
VANCOMYCIN TROUGH SERPL-MCNC: 20.6 UG/ML (ref 15–20)
WBC # SPEC AUTO: 6.88 X10(3)/MCL (ref 4.5–11.5)

## 2023-08-31 PROCEDURE — 80202 ASSAY OF VANCOMYCIN: CPT | Performed by: STUDENT IN AN ORGANIZED HEALTH CARE EDUCATION/TRAINING PROGRAM

## 2023-08-31 PROCEDURE — 97110 THERAPEUTIC EXERCISES: CPT

## 2023-08-31 PROCEDURE — 80048 BASIC METABOLIC PNL TOTAL CA: CPT | Performed by: STUDENT IN AN ORGANIZED HEALTH CARE EDUCATION/TRAINING PROGRAM

## 2023-08-31 PROCEDURE — 99900035 HC TECH TIME PER 15 MIN (STAT)

## 2023-08-31 PROCEDURE — 63600175 PHARM REV CODE 636 W HCPCS: Performed by: STUDENT IN AN ORGANIZED HEALTH CARE EDUCATION/TRAINING PROGRAM

## 2023-08-31 PROCEDURE — 83735 ASSAY OF MAGNESIUM: CPT | Performed by: STUDENT IN AN ORGANIZED HEALTH CARE EDUCATION/TRAINING PROGRAM

## 2023-08-31 PROCEDURE — 85025 COMPLETE CBC W/AUTO DIFF WBC: CPT | Performed by: STUDENT IN AN ORGANIZED HEALTH CARE EDUCATION/TRAINING PROGRAM

## 2023-08-31 PROCEDURE — 27000207 HC ISOLATION

## 2023-08-31 PROCEDURE — 11000004 HC SNF PRIVATE

## 2023-08-31 PROCEDURE — 25000003 PHARM REV CODE 250: Performed by: STUDENT IN AN ORGANIZED HEALTH CARE EDUCATION/TRAINING PROGRAM

## 2023-08-31 PROCEDURE — 94760 N-INVAS EAR/PLS OXIMETRY 1: CPT

## 2023-08-31 RX ORDER — ENOXAPARIN SODIUM 150 MG/ML
110 INJECTION SUBCUTANEOUS EVERY 12 HOURS
Status: DISCONTINUED | OUTPATIENT
Start: 2023-09-01 | End: 2023-09-01

## 2023-08-31 RX ORDER — ENOXAPARIN SODIUM 150 MG/ML
110 INJECTION SUBCUTANEOUS EVERY 12 HOURS
Status: DISCONTINUED | OUTPATIENT
Start: 2023-09-01 | End: 2023-08-31

## 2023-08-31 RX ADMIN — SODIUM CHLORIDE: 9 INJECTION, SOLUTION INTRAVENOUS at 03:08

## 2023-08-31 RX ADMIN — INSULIN ASPART 7 UNITS: 100 INJECTION, SOLUTION INTRAVENOUS; SUBCUTANEOUS at 07:08

## 2023-08-31 RX ADMIN — ESCITALOPRAM 5 MG: 5 TABLET, FILM COATED ORAL at 09:08

## 2023-08-31 RX ADMIN — CARVEDILOL 12.5 MG: 12.5 TABLET, FILM COATED ORAL at 07:08

## 2023-08-31 RX ADMIN — CEFEPIME 2 G: 2 INJECTION, POWDER, FOR SOLUTION INTRAVENOUS at 02:08

## 2023-08-31 RX ADMIN — CEFEPIME 2 G: 2 INJECTION, POWDER, FOR SOLUTION INTRAVENOUS at 09:08

## 2023-08-31 RX ADMIN — CEFEPIME 2 G: 2 INJECTION, POWDER, FOR SOLUTION INTRAVENOUS at 05:08

## 2023-08-31 RX ADMIN — SODIUM CHLORIDE: 9 INJECTION, SOLUTION INTRAVENOUS at 02:08

## 2023-08-31 RX ADMIN — INSULIN DETEMIR 20 UNITS: 100 INJECTION, SOLUTION SUBCUTANEOUS at 09:08

## 2023-08-31 RX ADMIN — CARVEDILOL 12.5 MG: 12.5 TABLET, FILM COATED ORAL at 04:08

## 2023-08-31 RX ADMIN — INSULIN ASPART 7 UNITS: 100 INJECTION, SOLUTION INTRAVENOUS; SUBCUTANEOUS at 11:08

## 2023-08-31 RX ADMIN — ZOLPIDEM TARTRATE 5 MG: 5 TABLET ORAL at 10:08

## 2023-08-31 RX ADMIN — INSULIN ASPART 7 UNITS: 100 INJECTION, SOLUTION INTRAVENOUS; SUBCUTANEOUS at 04:08

## 2023-08-31 RX ADMIN — MULTIPLE VITAMINS W/ MINERALS TAB 1 TABLET: TAB at 09:08

## 2023-08-31 RX ADMIN — FLUCONAZOLE 200 MG: 200 TABLET ORAL at 09:08

## 2023-08-31 RX ADMIN — MELATONIN TAB 3 MG 9 MG: 3 TAB at 08:08

## 2023-08-31 RX ADMIN — SODIUM CHLORIDE: 9 INJECTION, SOLUTION INTRAVENOUS at 05:08

## 2023-08-31 RX ADMIN — OXYCODONE HYDROCHLORIDE AND ACETAMINOPHEN 500 MG: 500 TABLET ORAL at 09:08

## 2023-08-31 RX ADMIN — SODIUM CHLORIDE: 9 INJECTION, SOLUTION INTRAVENOUS at 09:08

## 2023-08-31 RX ADMIN — SENNOSIDES AND DOCUSATE SODIUM 2 TABLET: 8.6; 5 TABLET ORAL at 09:08

## 2023-08-31 RX ADMIN — VANCOMYCIN HYDROCHLORIDE 750 MG: 750 INJECTION, POWDER, LYOPHILIZED, FOR SOLUTION INTRAVENOUS at 03:08

## 2023-08-31 RX ADMIN — SITAGLIPTIN 100 MG: 50 TABLET, FILM COATED ORAL at 09:08

## 2023-08-31 RX ADMIN — FERROUS SULFATE TAB 325 MG (65 MG ELEMENTAL FE) 1 EACH: 325 (65 FE) TAB at 09:08

## 2023-08-31 RX ADMIN — TRAZODONE HYDROCHLORIDE 50 MG: 50 TABLET ORAL at 08:08

## 2023-08-31 NOTE — NURSING
Left hip wound actively bleeding, packed with adaptic, guaze and pressure drsg applied. BP initially checked after wound care 93/67 but rechecked [id number]. Patient non symptomatic, MD notified, labs scheduled in AM. Drsg remains dry and intact.
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
## 2023-08-31 NOTE — PLAN OF CARE
Problem: Adult Inpatient Plan of Care  Goal: Plan of Care Review  Outcome: Ongoing, Progressing  Flowsheets (Taken 8/30/2023 2024)  Plan of Care Reviewed With:   patient   mother  Goal: Patient-Specific Goal (Individualized)  Outcome: Ongoing, Progressing  Flowsheets (Taken 8/30/2023 2024)  Anxieties, Fears or Concerns: bowel regimen  Individualized Care Needs: bowel regimen admin, wound care, IV abt  Goal: Absence of Hospital-Acquired Illness or Injury  Outcome: Ongoing, Progressing  Intervention: Identify and Manage Fall Risk  Flowsheets (Taken 8/30/2023 2024)  Safety Promotion/Fall Prevention:   assistive device/personal item within reach   bed alarm set   medications reviewed   instructed to call staff for mobility  Intervention: Prevent Skin Injury  Flowsheets (Taken 8/30/2023 2024)  Body Position:   heels elevated   sitting up in bed   turned  Skin Protection:   adhesive use limited   incontinence pads utilized   tubing/devices free from skin contact  Intervention: Prevent and Manage VTE (Venous Thromboembolism) Risk  Flowsheets (Taken 8/30/2023 2024)  Activity Management: Rolling - L1  VTE Prevention/Management:   bleeding precations maintained   bleeding risk assessed   bleeding risk factor(s) identified, provider notified  Intervention: Prevent Infection  Flowsheets (Taken 8/30/2023 2024)  Infection Prevention:   cohorting utilized   environmental surveillance performed   equipment surfaces disinfected   hand hygiene promoted   personal protective equipment utilized   single patient room provided   rest/sleep promoted  Goal: Optimal Comfort and Wellbeing  Outcome: Ongoing, Progressing  Intervention: Monitor Pain and Promote Comfort  Flowsheets (Taken 8/30/2023 2024)  Pain Management Interventions:   care clustered   quiet environment facilitated   medication offered but refused   pain management plan reviewed with patient/caregiver  Goal: Readiness for Transition of Care  Outcome: Ongoing, Progressing      Problem: Diabetes Comorbidity  Goal: Blood Glucose Level Within Targeted Range  Outcome: Ongoing, Progressing     Problem: Adjustment to Illness (Sepsis/Septic Shock)  Goal: Optimal Coping  Outcome: Ongoing, Progressing     Problem: Bleeding (Sepsis/Septic Shock)  Goal: Absence of Bleeding  Outcome: Ongoing, Progressing     Problem: Glycemic Control Impaired (Sepsis/Septic Shock)  Goal: Blood Glucose Level Within Desired Range  Outcome: Ongoing, Progressing  Intervention: Optimize Glycemic Control  Flowsheets (Taken 8/30/2023 2024)  Glycemic Management:   blood glucose monitored   supplemental insulin given     Problem: Infection Progression (Sepsis/Septic Shock)  Goal: Absence of Infection Signs and Symptoms  Outcome: Ongoing, Progressing     Problem: Nutrition Impaired (Sepsis/Septic Shock)  Goal: Optimal Nutrition Intake  Outcome: Ongoing, Progressing     Problem: Infection  Goal: Absence of Infection Signs and Symptoms  Outcome: Ongoing, Progressing     Problem: Impaired Wound Healing  Goal: Optimal Wound Healing  Outcome: Ongoing, Progressing     Problem: Skin Injury Risk Increased  Goal: Skin Health and Integrity  Outcome: Ongoing, Progressing     Problem: Fall Injury Risk  Goal: Absence of Fall and Fall-Related Injury  Outcome: Ongoing, Progressing

## 2023-08-31 NOTE — PT/OT/SLP PROGRESS
Physical Therapy Treatment Note           Name: Antonio Galvan II    : 1965 (57 y.o.)  MRN: [id number]           TREATMENT SUMMARY AND RECOMMENDATIONS:    PT Received On: 23  PT Start Time: 952   PT Stop Time:   PT Total Time (min): 23 min       Subjective Assessment:   No complaints  Lethargic   x Awake, alert, cooperative  Uncooperative    Agitated  c/o pain    Appropriate  c/o fatigue    Confused x Treated at bedside     Emotionally labile  Treated in gym/dept.    Impulsive  Other:    Flat affect       Therapy Precautions:    Cognitive deficits  Spinal precautions    Collar - hard  Sternal precautions    Collar - soft   TLSO    Fall risk  LSO    Hip precautions - posterior  Knee immobilizer    Hip precautions - anterior  WBAT    Impaired communication  Partial weightbearing    Oxygen  TTWB    PEG tube  NWB    Visual deficits x Other: perineural catheter, PICC line, pressure relief boots     Hearing deficits  x Quadriplegia        Treatment Objectives:     Mobility Training:   Assist level Comments    Bed mobility     Transfer     Gait     Sit to stand transitions     Sitting balance     Standing balance      Wheelchair mobility     Car transfer     Other:          Therapeutic Exercise:   Exercise Sets Reps Comments   PROM to B UE and LE, all joints and digits   All functional planes to tolerance with sustained hold at end ranges                         Additional Comments:  ROM tasks completed to prevent further contractures and ensure ability for positioning in WC, along with proper positioning to reduce further skin break down.  Good tolerance demonstrated. Less draining from wound noted today.     Assessment: Patient tolerated session well.    PT Plan: continue per POC  Revisions made to plan of care: No    GOALS:   Multidisciplinary Problems       Physical Therapy Goals          Problem: Physical Therapy    Goal Priority Disciplines Outcome Goal Variances Interventions   Physical  Therapy Goal     PT, PT/OT Ongoing, Progressing     Description: Goals to be met by: Discharge     Patient will increase functional independence with mobility by performin.  Pt to receive PROM BLEs and UEs 5-6 d/wk, qd,  to prevent further contractures and ensure ability for positioning in WC, along with proper positioning to reduce further skin break down  2.  Pt to be assessed for appropriateness for out of bed to WC - awaiting wound care                       Skilled PT Minutes Provided: 23   Communication with Treatment Team:     Equipment recommendations:       At end of treatment, patient remained:   Up in chair     Up in wheelchair in room   x In bed    With alarm activated   x Bed rails up   x Call bell in reach    x Family/friends present    Restraints secured properly    In bathroom with CNA/RN notified    Nurse aware    In gym with therapist/tech    Other:

## 2023-08-31 NOTE — NURSING
Nurses Note -- 4 Eyes      8/31/2023   3:42 AM      Skin assessed during: Daily Assessment      [] No Altered Skin Integrity Present    []Prevention Measures Documented      [x] Yes- Altered Skin Integrity Present or Discovered   [x] LDA Added if Not in Epic (Describe Wound)   [x] New Altered Skin Integrity was Present on Admit and Documented in LDA   [x] Wound Image Taken    Wound Care Consulted? Yes    Attending Nurse:  Christa Montano RN/Staff Member:   ceci

## 2023-08-31 NOTE — PROGRESS NOTES
Pharmacokinetic Assessment Follow Up: IV Vancomycin    Vancomycin serum concentration assessment(s):    The trough level was drawn correctly and can be used to guide therapy at this time. The measurement is below the desired definitive target range of 15 to 20 mcg/mL.    Vancomycin Regimen Plan:    Continue regimen to Vancomycin 750 mg IV every 24 hours with next serum trough concentration measured at 90 minutes prior to 1430 dose on 9/3    Drug levels (last 3 results):  Recent Labs   Lab Result Units 08/29/23  1130 08/30/23  1228 08/31/23  1306   Vancomycin Trough ug/ml 21.3* 20.2* 20.6*       Pharmacy will continue to follow and monitor vancomycin.    Please contact pharmacy at extension [phone] for questions regarding this assessment.    Thank you for the consult,   Errol Garcia       Patient brief summary:  Antonio Galvan II is a 57 y.o. male initiated on antimicrobial therapy with IV Vancomycin for treatment of bone/joint infection    The patient's current regimen is vancomycin 750 mg q24hr    Drug Allergies:   Review of patient's allergies indicates:  No Known Allergies    Actual Body Weight:   106.6 kg    Renal Function:   Estimated Creatinine Clearance: 83.7 mL/min (based on SCr of 1.18 mg/dL).,     Dialysis Method (if applicable):  N/A    CBC (last 72 hours):  Recent Labs   Lab Result Units 08/30/23  2020 08/31/23  0322   WBC x10(3)/mcL  --  6.88   Hgb g/dL 7.7* 8.2*   Hct % 27.2* 28.5*   Platelet x10(3)/mcL  --  208   Mono % %  --  7.0   Eos % %  --  3.2   Basophil % %  --  0.3       Metabolic Panel (last 72 hours):  Recent Labs   Lab Result Units 08/31/23  0322   Sodium Level mmol/L 140   Potassium Level mmol/L 4.5   Chloride mmol/L 108*   Carbon Dioxide mmol/L 24   Glucose Level mg/dL 191*   Blood Urea Nitrogen mg/dL 35.0*   Creatinine mg/dL 1.18   Magnesium Level mg/dL 2.00       Vancomycin Administrations:  vancomycin given in the last 96 hours                     vancomycin 750 mg in dextrose 5 %  (D5W) 250 mL IVPB (Vial-Mate) (mg) 750 mg New Bag 08/30/23 1459    vancomycin 750 mg in dextrose 5 % (D5W) 250 mL IVPB (Vial-Mate) (mg) 750 mg New Bag 08/29/23 1319    vancomycin (VANCOCIN) 1,000 mg in dextrose 5 % (D5W) 250 mL IVPB (Vial-Mate) (mg) 1,000 mg New Bag 08/28/23 1228                    Microbiologic Results:  Microbiology Results (last 7 days)       Procedure Component Value Units Date/Time    Urine culture [[id number]]  (Abnormal) Collected: 08/23/23 1926    Order Status: Completed Specimen: Urine Updated: 08/26/23 0908     Urine Culture >/= 100,000 colonies/ml Candida albicans

## 2023-09-01 LAB
ABO + RH BLD: NORMAL
ABO + RH BLD: NORMAL
ANION GAP SERPL CALC-SCNC: 6 MEQ/L
BASOPHILS # BLD AUTO: 0.02 X10(3)/MCL
BASOPHILS NFR BLD AUTO: 0.3 %
BLD PROD TYP BPU: NORMAL
BLD PROD TYP BPU: NORMAL
BLOOD UNIT EXPIRATION DATE: NORMAL
BLOOD UNIT EXPIRATION DATE: NORMAL
BLOOD UNIT TYPE CODE: 7300
BLOOD UNIT TYPE CODE: 7300
BUN SERPL-MCNC: 35.9 MG/DL (ref 8.4–25.7)
CALCIUM SERPL-MCNC: 8 MG/DL (ref 8.4–10.2)
CHLORIDE SERPL-SCNC: 108 MMOL/L (ref 98–107)
CO2 SERPL-SCNC: 23 MMOL/L (ref 22–29)
CREAT SERPL-MCNC: 1.25 MG/DL (ref 0.73–1.18)
CREAT/UREA NIT SERPL: 29
CROSSMATCH INTERPRETATION: NORMAL
CROSSMATCH INTERPRETATION: NORMAL
DISPENSE STATUS: NORMAL
DISPENSE STATUS: NORMAL
EOSINOPHIL # BLD AUTO: 0.19 X10(3)/MCL (ref 0–0.9)
EOSINOPHIL NFR BLD AUTO: 2.8 %
ERYTHROCYTE [DISTWIDTH] IN BLOOD BY AUTOMATED COUNT: 18.5 % (ref 11.5–17)
GFR SERPLBLD CREATININE-BSD FMLA CKD-EPI: >60 MLS/MIN/1.73/M2
GLUCOSE SERPL-MCNC: 143 MG/DL (ref 74–100)
GROUP & RH: NORMAL
HCT VFR BLD AUTO: 23.7 % (ref 42–52)
HGB BLD-MCNC: 6.9 G/DL (ref 14–18)
IMM GRANULOCYTES # BLD AUTO: 0.02 X10(3)/MCL (ref 0–0.04)
IMM GRANULOCYTES NFR BLD AUTO: 0.3 %
INDIRECT COOMBS GEL: NORMAL
LYMPHOCYTES # BLD AUTO: 1.8 X10(3)/MCL (ref 0.6–4.6)
LYMPHOCYTES NFR BLD AUTO: 26.4 %
MCH RBC QN AUTO: 26.1 PG (ref 27–31)
MCHC RBC AUTO-ENTMCNC: 29.1 G/DL (ref 33–36)
MCV RBC AUTO: 89.8 FL (ref 80–94)
MONOCYTES # BLD AUTO: 0.46 X10(3)/MCL (ref 0.1–1.3)
MONOCYTES NFR BLD AUTO: 6.7 %
NEUTROPHILS # BLD AUTO: 4.34 X10(3)/MCL (ref 2.1–9.2)
NEUTROPHILS NFR BLD AUTO: 63.5 %
PLATELET # BLD AUTO: 207 X10(3)/MCL (ref 130–400)
PMV BLD AUTO: 10.9 FL (ref 7.4–10.4)
POCT GLUCOSE: 168 MG/DL (ref 70–110)
POCT GLUCOSE: 207 MG/DL (ref 70–110)
POCT GLUCOSE: 288 MG/DL (ref 70–110)
POTASSIUM SERPL-SCNC: 4.8 MMOL/L (ref 3.5–5.1)
RBC # BLD AUTO: 2.64 X10(6)/MCL (ref 4.7–6.1)
SODIUM SERPL-SCNC: 137 MMOL/L (ref 136–145)
SPECIMEN OUTDATE: NORMAL
UNIT NUMBER: NORMAL
UNIT NUMBER: NORMAL
WBC # SPEC AUTO: 6.83 X10(3)/MCL (ref 4.5–11.5)

## 2023-09-01 PROCEDURE — 86920 COMPATIBILITY TEST SPIN: CPT | Performed by: FAMILY MEDICINE

## 2023-09-01 PROCEDURE — 25000003 PHARM REV CODE 250: Performed by: STUDENT IN AN ORGANIZED HEALTH CARE EDUCATION/TRAINING PROGRAM

## 2023-09-01 PROCEDURE — 99900035 HC TECH TIME PER 15 MIN (STAT)

## 2023-09-01 PROCEDURE — 11000004 HC SNF PRIVATE

## 2023-09-01 PROCEDURE — 63600175 PHARM REV CODE 636 W HCPCS: Performed by: STUDENT IN AN ORGANIZED HEALTH CARE EDUCATION/TRAINING PROGRAM

## 2023-09-01 PROCEDURE — 36430 TRANSFUSION BLD/BLD COMPNT: CPT

## 2023-09-01 PROCEDURE — 94760 N-INVAS EAR/PLS OXIMETRY 1: CPT

## 2023-09-01 PROCEDURE — 80048 BASIC METABOLIC PNL TOTAL CA: CPT | Performed by: STUDENT IN AN ORGANIZED HEALTH CARE EDUCATION/TRAINING PROGRAM

## 2023-09-01 PROCEDURE — P9016 RBC LEUKOCYTES REDUCED: HCPCS | Performed by: FAMILY MEDICINE

## 2023-09-01 PROCEDURE — 85025 COMPLETE CBC W/AUTO DIFF WBC: CPT | Performed by: STUDENT IN AN ORGANIZED HEALTH CARE EDUCATION/TRAINING PROGRAM

## 2023-09-01 PROCEDURE — 27000207 HC ISOLATION

## 2023-09-01 PROCEDURE — 86900 BLOOD TYPING SEROLOGIC ABO: CPT | Performed by: FAMILY MEDICINE

## 2023-09-01 RX ORDER — HYDROCODONE BITARTRATE AND ACETAMINOPHEN 500; 5 MG/1; MG/1
TABLET ORAL
Status: DISCONTINUED | OUTPATIENT
Start: 2023-09-01 | End: 2023-09-27 | Stop reason: HOSPADM

## 2023-09-01 RX ADMIN — INSULIN ASPART 7 UNITS: 100 INJECTION, SOLUTION INTRAVENOUS; SUBCUTANEOUS at 01:09

## 2023-09-01 RX ADMIN — INSULIN DETEMIR 20 UNITS: 100 INJECTION, SOLUTION SUBCUTANEOUS at 09:09

## 2023-09-01 RX ADMIN — VANCOMYCIN HYDROCHLORIDE 750 MG: 750 INJECTION, POWDER, LYOPHILIZED, FOR SOLUTION INTRAVENOUS at 06:09

## 2023-09-01 RX ADMIN — CEFEPIME 2 G: 2 INJECTION, POWDER, FOR SOLUTION INTRAVENOUS at 05:09

## 2023-09-01 RX ADMIN — SODIUM CHLORIDE: 9 INJECTION, SOLUTION INTRAVENOUS at 05:09

## 2023-09-01 RX ADMIN — FERROUS SULFATE TAB 325 MG (65 MG ELEMENTAL FE) 1 EACH: 325 (65 FE) TAB at 09:09

## 2023-09-01 RX ADMIN — INSULIN ASPART 7 UNITS: 100 INJECTION, SOLUTION INTRAVENOUS; SUBCUTANEOUS at 05:09

## 2023-09-01 RX ADMIN — INSULIN ASPART 7 UNITS: 100 INJECTION, SOLUTION INTRAVENOUS; SUBCUTANEOUS at 07:09

## 2023-09-01 RX ADMIN — ZOLPIDEM TARTRATE 5 MG: 5 TABLET ORAL at 11:09

## 2023-09-01 RX ADMIN — INSULIN ASPART 3 UNITS: 100 INJECTION, SOLUTION INTRAVENOUS; SUBCUTANEOUS at 09:09

## 2023-09-01 RX ADMIN — MULTIPLE VITAMINS W/ MINERALS TAB 1 TABLET: TAB at 09:09

## 2023-09-01 RX ADMIN — LIDOCAINE HYDROCHLORIDE 10 ML: 20 JELLY TOPICAL at 05:09

## 2023-09-01 RX ADMIN — SITAGLIPTIN 100 MG: 50 TABLET, FILM COATED ORAL at 09:09

## 2023-09-01 RX ADMIN — Medication 1 ENEMA: at 05:09

## 2023-09-01 RX ADMIN — MELATONIN TAB 3 MG 9 MG: 3 TAB at 09:09

## 2023-09-01 RX ADMIN — ESCITALOPRAM 5 MG: 5 TABLET, FILM COATED ORAL at 09:09

## 2023-09-01 RX ADMIN — TRAZODONE HYDROCHLORIDE 50 MG: 50 TABLET ORAL at 09:09

## 2023-09-01 RX ADMIN — OXYCODONE HYDROCHLORIDE AND ACETAMINOPHEN 500 MG: 500 TABLET ORAL at 09:09

## 2023-09-01 RX ADMIN — CARVEDILOL 12.5 MG: 12.5 TABLET, FILM COATED ORAL at 07:09

## 2023-09-01 RX ADMIN — FLUCONAZOLE 200 MG: 200 TABLET ORAL at 09:09

## 2023-09-01 RX ADMIN — CARVEDILOL 12.5 MG: 12.5 TABLET, FILM COATED ORAL at 05:09

## 2023-09-01 NOTE — PT/OT/SLP PROGRESS
Name: Antonio LAGUERRE Mandy PATTERSON    : 1965 (57 y.o.)  MRN: [id number]           Patient Unavailable      Patient unable to be seen at this time secondary to: pt on hold today due to excessive drainage/bleeding from wound- MD and wound nurse agreed with decision

## 2023-09-01 NOTE — PROGRESS NOTES
Follow up assessment performed.  Dr. Ortega present for assessment.   L hip pressure dressing intact to Left hip.  No strike thru noted to outer dressing.   Upon dressing removal, no active bleeding noted, only old dried sanguineous drainage to removed dressing.   Few ecchymotic areas present within wound bed noted, however, no slough or non viable tissue noted.   All other wounds remain stable with current dressing change regimen.   Recommend continuing to apply adaptic to wound  base on Left hip/ R hip to prevent active bleeding of wound beds.   All pressure prevention measures continue per nursing.   Bariatric ALAL mattress in use, heel lift boots, wedge, turn q 2 hrs.  Recommend continuing with only ROM in bed at this time to prevent potential bleeding from hip ulcerations if positioned on side of bed.          09/01/23 1249   WOCN Assessment   WOCN Total Time (mins) 90   Visit Date 09/01/23   Consult Type Follow Up   WOCN Speciality Wound   Wound pressure;surgical   Number of Wounds 6   Intervention assessed   Teaching on-going        Altered Skin Integrity 05/06/22 1140 Right Heel  Full thickness tissue loss. Subcutaneous fat may be visible but bone, tendon or muscle are not exposed   Date First Assessed/Time First Assessed: 05/06/22 1140   Altered Skin Integrity Present on Admission: yes  Side: Right  Location: Heel  Is this injury device related?: No  Primary Wound Type: (c)   Description of Altered Skin Integrity: Full thickness...   Wound Image    Description of Altered Skin Integrity Full thickness tissue loss. Subcutaneous fat may be visible but bone, tendon or muscle are not exposed   Dressing Appearance Intact;Dried drainage   Drainage Amount Scant   Drainage Characteristics/Odor Sanguineous;No odor;Bleeding controlled   Appearance Other (see comments)  (scab, scar tissue)   Tissue loss description Full thickness   Periwound Area Scar tissue;Ecchymotic;Dry   Wound Edges Irregular   Wound Length (cm)  4.5 cm   Wound Width (cm) 1 cm   Wound Surface Area (cm^2) 4.5 cm^2   Care Cleansed with:;Sterile normal saline;Applied:;Povidone iodine   Dressing Applied;Gauze;Absorptive Pad;Rolled gauze   Dressing Change Due 09/02/23        Altered Skin Integrity 01/12/23 1530 Sacral spine Full thickness tissue loss with exposed bone, tendon, or muscle. Often includes undermining and tunneling. May extend into muscle and/or supporting structures.   Date First Assessed/Time First Assessed: 01/12/23 1530   Altered Skin Integrity Present on Admission - Did Patient arrive to the hospital with altered skin?: yes  Location: Sacral spine  Description of Altered Skin Integrity: Full thickness tissue los...   Wound Image    Description of Altered Skin Integrity Full thickness tissue loss. Subcutaneous fat may be visible but bone, tendon or muscle are not exposed   Dressing Appearance Intact;Moist drainage   Drainage Amount Moderate   Drainage Characteristics/Odor Serosanguineous;No odor;Bleeding controlled   Appearance Red;Granulating   Tissue loss description Full thickness   Red (%), Wound Tissue Color 100 %   Periwound Area Scar tissue;Denuded   Wound Edges Defined   Wound Length (cm) 3 cm   Wound Width (cm) 2 cm   Wound Depth (cm) 0.5 cm   Wound Volume (cm^3) 3 cm^3   Wound Surface Area (cm^2) 6 cm^2   Care Cleansed with:;Sterile normal saline   Dressing Applied;Calcium alginate;Silver;Gauze;Absorptive Pad   Dressing Change Due 09/02/23        Altered Skin Integrity 08/01/23 1535 Left anterior Ankle Other (comment) Full thickness tissue loss. Base is covered by slough and/or eschar in the wound bed   Date First Assessed/Time First Assessed: 08/01/23 1535   Altered Skin Integrity Present on Admission - Did Patient arrive to the hospital with altered skin?: yes  Side: Left  Orientation: anterior  Location: Ankle  Is this injury device related?: No  ...   Description of Altered Skin Integrity Full thickness tissue loss. Base is covered  by slough and/or eschar in the wound bed   Dressing Appearance Intact;Dry;Clean   Drainage Amount None   Appearance Black;Eschar;Dry   Tissue loss description Full thickness   Black (%), Wound Tissue Color 100 %   Periwound Area Dry;Intact   Wound Edges Defined   Wound Length (cm) 3.5 cm   Wound Width (cm) 3.3 cm   Wound Surface Area (cm^2) 11.55 cm^2   Care Cleansed with:;Sterile normal saline;Applied:;Povidone iodine   Dressing Applied;Gauze;Absorptive Pad;Rolled gauze   Dressing Change Due 09/02/23        Altered Skin Integrity 08/01/23 1534 Left posterior Ankle Full thickness tissue loss. Base is covered by slough and/or eschar in the wound bed   Date First Assessed/Time First Assessed: 08/01/23 1534   Altered Skin Integrity Present on Admission - Did Patient arrive to the hospital with altered skin?: yes  Side: Left  Orientation: posterior  Location: Ankle  Description of Altered Skin Integri...   Wound Image    Description of Altered Skin Integrity Full thickness tissue loss. Base is covered by slough and/or eschar in the wound bed   Dressing Appearance Intact;Dry;Clean   Drainage Amount None   Appearance Dry;Black;Eschar   Tissue loss description Full thickness   Black (%), Wound Tissue Color 100 %   Periwound Area Intact;Dry   Wound Edges Defined   Wound Length (cm) 4.5 cm   Wound Width (cm) 3.5 cm   Wound Surface Area (cm^2) 15.75 cm^2   Care Cleansed with:;Sterile normal saline;Applied:;Povidone iodine   Dressing Applied;Gauze;Absorptive Pad;Rolled gauze   Dressing Change Due 09/02/23        Incision/Site 08/16/23 2011 Right Hip posterior   Date First Assessed/Time First Assessed: 08/16/23 2011   Side: Right  Location: Hip  Orientation: posterior   Wound Image    Dressing Appearance Intact;Moist drainage   Drainage Amount Moderate   Drainage Characteristics/Odor Serosanguineous;No odor;Bleeding controlled   Appearance Other (see comments);Red;Pink;Granulating  (connective tissue)   Red (%), Wound Tissue  Color 90 %   Periwound Area Intact;Ecchymotic   Wound Edges Defined   Wound Length (cm) 11.5 cm   Wound Width (cm) 15 cm   Wound Depth (cm) 4.3 cm   Wound Volume (cm^3) 741.75 cm^3   Wound Surface Area (cm^2) 172.5 cm^2   Care Cleansed with:;Antimicrobial agent   Dressing Applied;Non-adherent;Gauze, wet to moist;Gauze;Absorptive Pad   Packing packed with;other (see comment)  (vashe moistened gauze)   Dressing Change Due 09/02/23        Incision/Site 08/16/23 2011 Left Hip lateral   Date First Assessed/Time First Assessed: 08/16/23 2011   Side: Left  Location: Hip  Orientation: lateral   Wound Image    Dressing Appearance Intact;Dried drainage   Drainage Amount Moderate   Drainage Characteristics/Odor Brown;Sanguineous;No odor;Bleeding controlled   Appearance Pink;Red;Granulating;Purple;Muscle;Other (see comments);Ecchymotic  (connective dissue)   Red (%), Wound Tissue Color 75 %   Periwound Area Intact   Wound Edges Defined   Wound Length (cm) 11.5 cm   Wound Width (cm) 6 cm   Wound Depth (cm) 3 cm   Wound Volume (cm^3) 207 cm^3   Wound Surface Area (cm^2) 69 cm^2   Care Cleansed with:;Antimicrobial agent   Dressing Applied;Non-adherent;Gauze, wet to moist;Absorptive Pad  (adaptic to wound bed, vashe moistened 4x4's within adaptic)   Dressing Change Due 09/02/23

## 2023-09-01 NOTE — PLAN OF CARE
Problem: Adult Inpatient Plan of Care  Goal: Plan of Care Review  Outcome: Ongoing, Progressing  Flowsheets (Taken 8/31/2023 2118)  Plan of Care Reviewed With:   patient   family  Goal: Patient-Specific Goal (Individualized)  Outcome: Ongoing, Progressing  Flowsheets (Taken 8/31/2023 2118)  Anxieties, Fears or Concerns: None verbalized at this time  Individualized Care Needs: Bowel regimen, wound care, monitor for left hip bleeding, IV ABx, Monitor CBG  Goal: Absence of Hospital-Acquired Illness or Injury  Outcome: Ongoing, Progressing  Intervention: Identify and Manage Fall Risk  Flowsheets (Taken 8/31/2023 2118)  Safety Promotion/Fall Prevention:   assistive device/personal item within reach   bed alarm set   medications reviewed   muscle strengthening facilitated   nonskid shoes/socks when out of bed   room near unit station  Intervention: Prevent Skin Injury  Flowsheets (Taken 8/31/2023 2118)  Body Position: sitting up in bed  Skin Protection:   adhesive use limited   incontinence pads utilized   tubing/devices free from skin contact   skin sealant/moisture barrier applied   transparent dressing maintained   silicone foam dressing in place   skin-to-skin areas padded   skin-to-device areas padded  Intervention: Prevent and Manage VTE (Venous Thromboembolism) Risk  Flowsheets (Taken 8/31/2023 2118)  Activity Management: Rolling - L1  VTE Prevention/Management:   fluids promoted   bleeding risk assessed   ROM (active) performed  Range of Motion: ROM (range of motion) performed  Intervention: Prevent Infection  Flowsheets (Taken 8/31/2023 2118)  Infection Prevention:   cohorting utilized   hand hygiene promoted   single patient room provided  Goal: Optimal Comfort and Wellbeing  Outcome: Ongoing, Progressing  Intervention: Monitor Pain and Promote Comfort  Flowsheets (Taken 8/31/2023 2118)  Pain Management Interventions:   diversional activity provided   pillow support provided   pain pump in use   prescribed  exercises encouraged  Intervention: Provide Person-Centered Care  Flowsheets (Taken 8/31/2023 2118)  Trust Relationship/Rapport:   care explained   choices provided   questions answered   thoughts/feelings acknowledged     Problem: Diabetes Comorbidity  Goal: Blood Glucose Level Within Targeted Range  Outcome: Ongoing, Progressing  Intervention: Monitor and Manage Glycemia  Flowsheets (Taken 8/31/2023 2118)  Glycemic Management:   blood glucose monitored   supplemental insulin given     Problem: Adjustment to Illness (Sepsis/Septic Shock)  Goal: Optimal Coping  Outcome: Ongoing, Progressing  Intervention: Optimize Psychosocial Adjustment to Illness  Flowsheets (Taken 8/31/2023 2118)  Supportive Measures:   active listening utilized   goal-setting facilitated  Family/Support System Care:   caregiver stress acknowledged   presence promoted     Problem: Bleeding (Sepsis/Septic Shock)  Goal: Absence of Bleeding  Outcome: Ongoing, Progressing  Intervention: Monitor and Manage Bleeding  Flowsheets (Taken 8/31/2023 2118)  Bleeding Precautions:   blood pressure closely monitored   monitored for signs of bleeding  Bleeding Management:   affected area elevated   dressing monitored     Problem: Glycemic Control Impaired (Sepsis/Septic Shock)  Goal: Blood Glucose Level Within Desired Range  Outcome: Ongoing, Progressing  Intervention: Optimize Glycemic Control  Flowsheets (Taken 8/31/2023 2118)  Glycemic Management:   blood glucose monitored   supplemental insulin given     Problem: Infection Progression (Sepsis/Septic Shock)  Goal: Absence of Infection Signs and Symptoms  Outcome: Ongoing, Progressing  Intervention: Initiate Sepsis Management  Flowsheets (Taken 8/31/2023 2118)  Infection Prevention:   cohorting utilized   hand hygiene promoted   single patient room provided  Infection Management: aseptic technique maintained  Stabilization Measures: legs elevated  Isolation Precautions:   contact   precautions  maintained  Intervention: Promote Stabilization  Flowsheets (Taken 8/31/2023 2118)  Fever Reduction/Comfort Measures:   lightweight bedding   lightweight clothing  Fluid/Electrolyte Management: fluids provided  Intervention: Promote Recovery  Flowsheets (Taken 8/31/2023 2118)  Sleep/Rest Enhancement:   consistent schedule promoted   therapeutic touch utilized  Airway/Ventilation Support: comfort measures provided  Activity Management: Rolling - L1     Problem: Nutrition Impaired (Sepsis/Septic Shock)  Goal: Optimal Nutrition Intake  Outcome: Ongoing, Progressing     Problem: Infection  Goal: Absence of Infection Signs and Symptoms  Outcome: Ongoing, Progressing  Intervention: Prevent or Manage Infection  Flowsheets (Taken 8/31/2023 2118)  Fever Reduction/Comfort Measures:   lightweight bedding   lightweight clothing  Infection Management: aseptic technique maintained  Isolation Precautions:   contact   precautions maintained     Problem: Impaired Wound Healing  Goal: Optimal Wound Healing  Outcome: Ongoing, Progressing  Intervention: Promote Wound Healing  Flowsheets (Taken 8/31/2023 2118)  Oral Nutrition Promotion:   calorie-dense foods provided   calorie-dense liquids provided  Sleep/Rest Enhancement:   consistent schedule promoted   therapeutic touch utilized  Activity Management: Rolling - L1  Pain Management Interventions:   diversional activity provided   pillow support provided   pain pump in use   prescribed exercises encouraged     Problem: Skin Injury Risk Increased  Goal: Skin Health and Integrity  Outcome: Ongoing, Progressing  Intervention: Optimize Skin Protection  Flowsheets (Taken 8/31/2023 2118)  Pressure Reduction Techniques:   frequent weight shift encouraged   rest period provided between sit times   weight shift assistance provided  Pressure Reduction Devices: positioning supports utilized  Skin Protection:   adhesive use limited   incontinence pads utilized   tubing/devices free from skin  contact   skin sealant/moisture barrier applied   transparent dressing maintained   silicone foam dressing in place   skin-to-skin areas padded   skin-to-device areas padded  Head of Bed (HOB) Positioning: HOB at 30-45 degrees  Intervention: Promote and Optimize Oral Intake  Flowsheets (Taken 8/31/2023 2118)  Oral Nutrition Promotion:   calorie-dense foods provided   calorie-dense liquids provided     Problem: Fall Injury Risk  Goal: Absence of Fall and Fall-Related Injury  Outcome: Ongoing, Progressing  Intervention: Identify and Manage Contributors  Flowsheets (Taken 8/31/2023 2118)  Self-Care Promotion:   independence encouraged   BADL personal objects within reach   BADL personal routines maintained  Medication Review/Management: medications reviewed  Intervention: Promote Injury-Free Environment  Flowsheets (Taken 8/31/2023 2118)  Safety Promotion/Fall Prevention:   assistive device/personal item within reach   bed alarm set   medications reviewed   muscle strengthening facilitated   nonskid shoes/socks when out of bed   room near unit station
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
## 2023-09-01 NOTE — PLAN OF CARE
Problem: Adult Inpatient Plan of Care  Goal: Plan of Care Review  Outcome: Ongoing, Progressing  Flowsheets (Taken 8/31/2023 1000)  Plan of Care Reviewed With:   patient   family  Goal: Patient-Specific Goal (Individualized)  Outcome: Ongoing, Progressing  Flowsheets (Taken 8/31/2023 1000)  Anxieties, Fears or Concerns: bowel regimen  Individualized Care Needs: bowel regimen admin, wound care, IV abx  Goal: Absence of Hospital-Acquired Illness or Injury  Outcome: Ongoing, Progressing  Intervention: Identify and Manage Fall Risk  Flowsheets (Taken 8/31/2023 1000)  Safety Promotion/Fall Prevention:   assistive device/personal item within reach   bed alarm set   nonskid shoes/socks when out of bed   side rails raised x 3  Intervention: Prevent and Manage VTE (Venous Thromboembolism) Risk  Flowsheets (Taken 8/31/2023 1000)  Activity Management: Rolling - L1  VTE Prevention/Management:   bleeding risk assessed   bleeding precations maintained  Range of Motion: active ROM (range of motion) encouraged  Intervention: Prevent Infection  Flowsheets (Taken 8/31/2023 1000)  Infection Prevention:   cohorting utilized   environmental surveillance performed   equipment surfaces disinfected   hand hygiene promoted   personal protective equipment utilized   rest/sleep promoted   single patient room provided  Goal: Optimal Comfort and Wellbeing  Outcome: Ongoing, Progressing  Intervention: Monitor Pain and Promote Comfort  Flowsheets (Taken 8/31/2023 2014)  Pain Management Interventions:   care clustered   pain management plan reviewed with patient/caregiver   position adjusted   pillow support provided   quiet environment facilitated  Intervention: Provide Person-Centered Care  Flowsheets (Taken 8/31/2023 1000)  Trust Relationship/Rapport:   care explained   choices provided   emotional support provided   empathic listening provided   questions answered   questions encouraged   reassurance provided   thoughts/feelings  acknowledged  Goal: Readiness for Transition of Care  Outcome: Ongoing, Progressing     Problem: Diabetes Comorbidity  Goal: Blood Glucose Level Within Targeted Range  Outcome: Ongoing, Progressing     Problem: Adjustment to Illness (Sepsis/Septic Shock)  Goal: Optimal Coping  Outcome: Ongoing, Progressing     Problem: Bleeding (Sepsis/Septic Shock)  Goal: Absence of Bleeding  Outcome: Ongoing, Progressing     Problem: Glycemic Control Impaired (Sepsis/Septic Shock)  Goal: Blood Glucose Level Within Desired Range  Outcome: Ongoing, Progressing     Problem: Infection Progression (Sepsis/Septic Shock)  Goal: Absence of Infection Signs and Symptoms  Outcome: Ongoing, Progressing     Problem: Nutrition Impaired (Sepsis/Septic Shock)  Goal: Optimal Nutrition Intake  Outcome: Ongoing, Progressing     Problem: Infection  Goal: Absence of Infection Signs and Symptoms  Outcome: Ongoing, Progressing     Problem: Impaired Wound Healing  Goal: Optimal Wound Healing  Outcome: Ongoing, Progressing     Problem: Skin Injury Risk Increased  Goal: Skin Health and Integrity  Outcome: Ongoing, Progressing     Problem: Fall Injury Risk  Goal: Absence of Fall and Fall-Related Injury  Outcome: Ongoing, Progressing

## 2023-09-01 NOTE — PLAN OF CARE
Problem: Adult Inpatient Plan of Care  Goal: Plan of Care Review  Outcome: Ongoing, Progressing  Flowsheets (Taken 9/1/2023 1838)  Plan of Care Reviewed With:   patient   family  Goal: Patient-Specific Goal (Individualized)  Outcome: Ongoing, Progressing  Flowsheets (Taken 9/1/2023 1838)  Anxieties, Fears or Concerns: None verbalized at this time  Individualized Care Needs: Bowel regimen, wound care, monitor for left hip bleeding, IV abx, monitor CBG, RBC infusion  Goal: Absence of Hospital-Acquired Illness or Injury  Outcome: Ongoing, Progressing  Intervention: Identify and Manage Fall Risk  Flowsheets (Taken 9/1/2023 1838)  Safety Promotion/Fall Prevention:   assistive device/personal item within reach   bed alarm set   side rails raised x 3  Intervention: Prevent Skin Injury  Flowsheets (Taken 9/1/2023 1838)  Body Position:   30 degrees   legs elevated  Skin Protection:   adhesive use limited   incontinence pads utilized   tubing/devices free from skin contact   transparent dressing maintained   skin sealant/moisture barrier applied  Intervention: Prevent and Manage VTE (Venous Thromboembolism) Risk  Flowsheets (Taken 9/1/2023 1838)  Activity Management: Rolling - L1  VTE Prevention/Management:   bleeding precations maintained   bleeding risk assessed  Range of Motion: active ROM (range of motion) encouraged  Intervention: Prevent Infection  Flowsheets (Taken 9/1/2023 1838)  Infection Prevention:   cohorting utilized   environmental surveillance performed   equipment surfaces disinfected   hand hygiene promoted   personal protective equipment utilized   rest/sleep promoted   single patient room provided  Goal: Optimal Comfort and Wellbeing  Outcome: Ongoing, Progressing  Intervention: Monitor Pain and Promote Comfort  Flowsheets (Taken 9/1/2023 1838)  Pain Management Interventions:   care clustered   pain management plan reviewed with patient/caregiver   position adjusted   pillow support provided   quiet  environment facilitated   warm blanket provided  Intervention: Provide Person-Centered Care  Flowsheets (Taken 9/1/2023 7475)  Trust Relationship/Rapport:   care explained   choices provided   emotional support provided   empathic listening provided   questions answered   questions encouraged   reassurance provided   thoughts/feelings acknowledged  Goal: Readiness for Transition of Care  Outcome: Ongoing, Progressing     Problem: Diabetes Comorbidity  Goal: Blood Glucose Level Within Targeted Range  Outcome: Ongoing, Progressing     Problem: Adjustment to Illness (Sepsis/Septic Shock)  Goal: Optimal Coping  Outcome: Ongoing, Progressing     Problem: Bleeding (Sepsis/Septic Shock)  Goal: Absence of Bleeding  Outcome: Ongoing, Progressing     Problem: Glycemic Control Impaired (Sepsis/Septic Shock)  Goal: Blood Glucose Level Within Desired Range  Outcome: Ongoing, Progressing     Problem: Infection Progression (Sepsis/Septic Shock)  Goal: Absence of Infection Signs and Symptoms  Outcome: Ongoing, Progressing     Problem: Nutrition Impaired (Sepsis/Septic Shock)  Goal: Optimal Nutrition Intake  Outcome: Ongoing, Progressing     Problem: Infection  Goal: Absence of Infection Signs and Symptoms  Outcome: Ongoing, Progressing     Problem: Impaired Wound Healing  Goal: Optimal Wound Healing  Outcome: Ongoing, Progressing     Problem: Skin Injury Risk Increased  Goal: Skin Health and Integrity  Outcome: Ongoing, Progressing     Problem: Fall Injury Risk  Goal: Absence of Fall and Fall-Related Injury  Outcome: Ongoing, Progressing

## 2023-09-02 LAB
ANION GAP SERPL CALC-SCNC: 7 MEQ/L
BASOPHILS # BLD AUTO: 0.02 X10(3)/MCL
BASOPHILS NFR BLD AUTO: 0.4 %
BUN SERPL-MCNC: 34.8 MG/DL (ref 8.4–25.7)
CALCIUM SERPL-MCNC: 8.7 MG/DL (ref 8.4–10.2)
CHLORIDE SERPL-SCNC: 108 MMOL/L (ref 98–107)
CO2 SERPL-SCNC: 21 MMOL/L (ref 22–29)
CREAT SERPL-MCNC: 1.28 MG/DL (ref 0.73–1.18)
CREAT/UREA NIT SERPL: 27
EOSINOPHIL # BLD AUTO: 0.21 X10(3)/MCL (ref 0–0.9)
EOSINOPHIL NFR BLD AUTO: 4 %
ERYTHROCYTE [DISTWIDTH] IN BLOOD BY AUTOMATED COUNT: 17.3 % (ref 11.5–17)
GFR SERPLBLD CREATININE-BSD FMLA CKD-EPI: >60 MLS/MIN/1.73/M2
GLUCOSE SERPL-MCNC: 231 MG/DL (ref 74–100)
HCT VFR BLD AUTO: 29.7 % (ref 42–52)
HGB BLD-MCNC: 8.9 G/DL (ref 14–18)
IMM GRANULOCYTES # BLD AUTO: 0.01 X10(3)/MCL (ref 0–0.04)
IMM GRANULOCYTES NFR BLD AUTO: 0.2 %
LYMPHOCYTES # BLD AUTO: 1.78 X10(3)/MCL (ref 0.6–4.6)
LYMPHOCYTES NFR BLD AUTO: 34.2 %
MCH RBC QN AUTO: 26.2 PG (ref 27–31)
MCHC RBC AUTO-ENTMCNC: 30 G/DL (ref 33–36)
MCV RBC AUTO: 87.4 FL (ref 80–94)
MONOCYTES # BLD AUTO: 0.45 X10(3)/MCL (ref 0.1–1.3)
MONOCYTES NFR BLD AUTO: 8.7 %
NEUTROPHILS # BLD AUTO: 2.73 X10(3)/MCL (ref 2.1–9.2)
NEUTROPHILS NFR BLD AUTO: 52.5 %
PLATELET # BLD AUTO: 190 X10(3)/MCL (ref 130–400)
PMV BLD AUTO: 10.8 FL (ref 7.4–10.4)
POCT GLUCOSE: 180 MG/DL (ref 70–110)
POCT GLUCOSE: 206 MG/DL (ref 70–110)
POCT GLUCOSE: 244 MG/DL (ref 70–110)
POCT GLUCOSE: 341 MG/DL (ref 70–110)
POTASSIUM SERPL-SCNC: 4.7 MMOL/L (ref 3.5–5.1)
RBC # BLD AUTO: 3.4 X10(6)/MCL (ref 4.7–6.1)
SODIUM SERPL-SCNC: 136 MMOL/L (ref 136–145)
WBC # SPEC AUTO: 5.2 X10(3)/MCL (ref 4.5–11.5)

## 2023-09-02 PROCEDURE — 85025 COMPLETE CBC W/AUTO DIFF WBC: CPT | Performed by: FAMILY MEDICINE

## 2023-09-02 PROCEDURE — 27000207 HC ISOLATION

## 2023-09-02 PROCEDURE — 11000004 HC SNF PRIVATE

## 2023-09-02 PROCEDURE — 94760 N-INVAS EAR/PLS OXIMETRY 1: CPT

## 2023-09-02 PROCEDURE — 25000003 PHARM REV CODE 250: Performed by: FAMILY MEDICINE

## 2023-09-02 PROCEDURE — 63600175 PHARM REV CODE 636 W HCPCS: Performed by: STUDENT IN AN ORGANIZED HEALTH CARE EDUCATION/TRAINING PROGRAM

## 2023-09-02 PROCEDURE — 80048 BASIC METABOLIC PNL TOTAL CA: CPT | Performed by: FAMILY MEDICINE

## 2023-09-02 PROCEDURE — 25000003 PHARM REV CODE 250: Performed by: STUDENT IN AN ORGANIZED HEALTH CARE EDUCATION/TRAINING PROGRAM

## 2023-09-02 PROCEDURE — 99900035 HC TECH TIME PER 15 MIN (STAT)

## 2023-09-02 PROCEDURE — 97110 THERAPEUTIC EXERCISES: CPT

## 2023-09-02 PROCEDURE — 63600175 PHARM REV CODE 636 W HCPCS: Performed by: FAMILY MEDICINE

## 2023-09-02 RX ORDER — HYDROXYZINE PAMOATE 50 MG/1
50 CAPSULE ORAL NIGHTLY PRN
Status: DISCONTINUED | OUTPATIENT
Start: 2023-09-02 | End: 2023-09-03

## 2023-09-02 RX ORDER — ENOXAPARIN SODIUM 150 MG/ML
110 INJECTION SUBCUTANEOUS EVERY 12 HOURS
Status: DISCONTINUED | OUTPATIENT
Start: 2023-09-02 | End: 2023-09-06

## 2023-09-02 RX ADMIN — MELATONIN TAB 3 MG 9 MG: 3 TAB at 09:09

## 2023-09-02 RX ADMIN — CEFEPIME 2 G: 2 INJECTION, POWDER, FOR SOLUTION INTRAVENOUS at 02:09

## 2023-09-02 RX ADMIN — SITAGLIPTIN 100 MG: 50 TABLET, FILM COATED ORAL at 09:09

## 2023-09-02 RX ADMIN — VANCOMYCIN HYDROCHLORIDE 750 MG: 750 INJECTION, POWDER, LYOPHILIZED, FOR SOLUTION INTRAVENOUS at 06:09

## 2023-09-02 RX ADMIN — INSULIN ASPART 2 UNITS: 100 INJECTION, SOLUTION INTRAVENOUS; SUBCUTANEOUS at 10:09

## 2023-09-02 RX ADMIN — INSULIN ASPART 4 UNITS: 100 INJECTION, SOLUTION INTRAVENOUS; SUBCUTANEOUS at 05:09

## 2023-09-02 RX ADMIN — CARVEDILOL 12.5 MG: 12.5 TABLET, FILM COATED ORAL at 07:09

## 2023-09-02 RX ADMIN — CEFEPIME 2 G: 2 INJECTION, POWDER, FOR SOLUTION INTRAVENOUS at 10:09

## 2023-09-02 RX ADMIN — ENOXAPARIN SODIUM 105 MG: 120 INJECTION SUBCUTANEOUS at 09:09

## 2023-09-02 RX ADMIN — ESCITALOPRAM 5 MG: 5 TABLET, FILM COATED ORAL at 09:09

## 2023-09-02 RX ADMIN — INSULIN DETEMIR 20 UNITS: 100 INJECTION, SOLUTION SUBCUTANEOUS at 10:09

## 2023-09-02 RX ADMIN — SODIUM CHLORIDE: 9 INJECTION, SOLUTION INTRAVENOUS at 05:09

## 2023-09-02 RX ADMIN — MULTIPLE VITAMINS W/ MINERALS TAB 1 TABLET: TAB at 09:09

## 2023-09-02 RX ADMIN — FLUCONAZOLE 200 MG: 200 TABLET ORAL at 09:09

## 2023-09-02 RX ADMIN — TRAZODONE HYDROCHLORIDE 50 MG: 50 TABLET ORAL at 09:09

## 2023-09-02 RX ADMIN — INSULIN ASPART 7 UNITS: 100 INJECTION, SOLUTION INTRAVENOUS; SUBCUTANEOUS at 05:09

## 2023-09-02 RX ADMIN — CARVEDILOL 12.5 MG: 12.5 TABLET, FILM COATED ORAL at 05:09

## 2023-09-02 RX ADMIN — HYDROXYZINE PAMOATE 50 MG: 50 CAPSULE ORAL at 09:09

## 2023-09-02 RX ADMIN — CEFEPIME 2 G: 2 INJECTION, POWDER, FOR SOLUTION INTRAVENOUS at 05:09

## 2023-09-02 RX ADMIN — INSULIN ASPART 7 UNITS: 100 INJECTION, SOLUTION INTRAVENOUS; SUBCUTANEOUS at 07:09

## 2023-09-02 RX ADMIN — OXYCODONE HYDROCHLORIDE AND ACETAMINOPHEN 500 MG: 500 TABLET ORAL at 09:09

## 2023-09-02 RX ADMIN — INSULIN ASPART 7 UNITS: 100 INJECTION, SOLUTION INTRAVENOUS; SUBCUTANEOUS at 11:09

## 2023-09-02 RX ADMIN — FERROUS SULFATE TAB 325 MG (65 MG ELEMENTAL FE) 1 EACH: 325 (65 FE) TAB at 09:09

## 2023-09-02 RX ADMIN — SODIUM CHLORIDE: 9 INJECTION, SOLUTION INTRAVENOUS at 10:09

## 2023-09-02 NOTE — PT/OT/SLP PROGRESS
Physical Therapy Treatment Note           Name: Antonio Galvan II    : 1965 (57 y.o.)  MRN: [id number]           TREATMENT SUMMARY AND RECOMMENDATIONS:    PT Received On: 23  PT Start Time: 1030  PT Stop Time: 1045  PT Total Time (min): 15 min       Subjective Assessment:   No complaints x Lethargic / sleepy    Awake, alert, cooperative  Uncooperative    Agitated  c/o pain    Appropriate  c/o fatigue    Confused x Treated at bedside     Emotionally labile  Treated in gym/dept.    Impulsive  Other:    Flat affect       Therapy Precautions:    Cognitive deficits  Spinal precautions    Collar - hard  Sternal precautions    Collar - soft   TLSO    Fall risk  LSO    Hip precautions - posterior  Knee immobilizer    Hip precautions - anterior  WBAT    Impaired communication  Partial weightbearing    Oxygen  TTWB    PEG tube  NWB    Visual deficits x Other: perineural catheter, PICC line, pressure relief boots     Hearing deficits  x Quadriplegia        Treatment Objectives:     Mobility Training:   Assist level Comments    Bed mobility     Transfer     Gait     Sit to stand transitions     Sitting balance     Standing balance      Wheelchair mobility     Car transfer     Other:          Therapeutic Exercise:   Exercise Sets Reps Comments   PROM to B UE and LE, all joints and digits   All functional planes to tolerance with sustained hold at end ranges                         Additional Comments:  ROM tasks completed to prevent further contractures and ensure ability for positioning in WC, along with proper positioning to reduce further skin break down. R sided tone high with contractures noted in elbows, hips, and knees.    Assessment: Patient tolerated session well.    PT Plan: continue per POC  Revisions made to plan of care: No    GOALS:   Multidisciplinary Problems       Physical Therapy Goals          Problem: Physical Therapy    Goal Priority Disciplines Outcome Goal Variances Interventions    Physical Therapy Goal     PT, PT/OT Ongoing, Progressing     Description: Goals to be met by: Discharge     Patient will increase functional independence with mobility by performin.  Pt to receive PROM BLEs and UEs 5-6 d/wk, qd,  to prevent further contractures and ensure ability for positioning in WC, along with proper positioning to reduce further skin break down  2.  Pt to be assessed for appropriateness for out of bed to WC - awaiting wound care                       Skilled PT Minutes Provided: 15   Communication with Treatment Team:     Equipment recommendations:       At end of treatment, patient remained:   Up in chair     Up in wheelchair in room   x In bed    With alarm activated   x Bed rails up   x Call bell in reach    x Family/friends present    Restraints secured properly    In bathroom with CNA/RN notified    Nurse aware    In gym with therapist/tech    Other:

## 2023-09-02 NOTE — PLAN OF CARE
Problem: Adult Inpatient Plan of Care  Goal: Plan of Care Review  Outcome: Ongoing, Progressing  Flowsheets (Taken 9/2/2023 1756)  Plan of Care Reviewed With:   patient   mother  Goal: Patient-Specific Goal (Individualized)  Outcome: Ongoing, Progressing  Flowsheets (Taken 9/2/2023 1756)  Anxieties, Fears or Concerns: None verbalized at this time  Individualized Care Needs: Bowel regimen, wound care, monitor for left hip bleeding, IV abx, monitor CBG, RBC infusion  Goal: Absence of Hospital-Acquired Illness or Injury  Outcome: Ongoing, Progressing  Intervention: Identify and Manage Fall Risk  Flowsheets (Taken 9/2/2023 1756)  Safety Promotion/Fall Prevention:   assistive device/personal item within reach   room near unit station  Intervention: Prevent Skin Injury  Flowsheets (Taken 9/2/2023 1756)  Body Position: weight shifting  Skin Protection:   adhesive use limited   incontinence pads utilized   tubing/devices free from skin contact  Intervention: Prevent and Manage VTE (Venous Thromboembolism) Risk  Flowsheets (Taken 9/2/2023 1756)  Activity Management: Rolling - L1  VTE Prevention/Management:   bleeding risk assessed   bleeding precations maintained  Range of Motion: active ROM (range of motion) encouraged  Intervention: Prevent Infection  Flowsheets (Taken 9/2/2023 1756)  Infection Prevention:   cohorting utilized   environmental surveillance performed   equipment surfaces disinfected   hand hygiene promoted   personal protective equipment utilized   rest/sleep promoted   single patient room provided  Goal: Optimal Comfort and Wellbeing  Outcome: Ongoing, Progressing  Intervention: Monitor Pain and Promote Comfort  Flowsheets (Taken 9/2/2023 1756)  Pain Management Interventions:   care clustered   quiet environment facilitated   pain management plan reviewed with patient/caregiver  Intervention: Provide Person-Centered Care  Flowsheets (Taken 9/2/2023 1756)  Trust Relationship/Rapport:   care explained    choices provided   emotional support provided   empathic listening provided   questions answered   questions encouraged   reassurance provided   thoughts/feelings acknowledged  Goal: Readiness for Transition of Care  Outcome: Ongoing, Progressing     Problem: Diabetes Comorbidity  Goal: Blood Glucose Level Within Targeted Range  Outcome: Ongoing, Progressing     Problem: Adjustment to Illness (Sepsis/Septic Shock)  Goal: Optimal Coping  Outcome: Ongoing, Progressing     Problem: Bleeding (Sepsis/Septic Shock)  Goal: Absence of Bleeding  Outcome: Ongoing, Progressing     Problem: Glycemic Control Impaired (Sepsis/Septic Shock)  Goal: Blood Glucose Level Within Desired Range  Outcome: Ongoing, Progressing     Problem: Infection Progression (Sepsis/Septic Shock)  Goal: Absence of Infection Signs and Symptoms  Outcome: Ongoing, Progressing     Problem: Nutrition Impaired (Sepsis/Septic Shock)  Goal: Optimal Nutrition Intake  Outcome: Ongoing, Progressing     Problem: Infection  Goal: Absence of Infection Signs and Symptoms  Outcome: Ongoing, Progressing     Problem: Impaired Wound Healing  Goal: Optimal Wound Healing  Outcome: Ongoing, Progressing     Problem: Skin Injury Risk Increased  Goal: Skin Health and Integrity  Outcome: Ongoing, Progressing     Problem: Fall Injury Risk  Goal: Absence of Fall and Fall-Related Injury  Outcome: Ongoing, Progressing
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
## 2023-09-02 NOTE — PLAN OF CARE
Problem: Diabetes Comorbidity  Goal: Blood Glucose Level Within Targeted Range  Outcome: Ongoing, Progressing  Intervention: Monitor and Manage Glycemia  Flowsheets (Taken 9/2/2023 0434)  Glycemic Management:   blood glucose monitored   supplemental insulin given     Problem: Bleeding (Sepsis/Septic Shock)  Goal: Absence of Bleeding  Outcome: Ongoing, Progressing  Intervention: Monitor and Manage Bleeding  Flowsheets (Taken 9/2/2023 0434)  Bleeding Precautions: blood pressure closely monitored  Bleeding Management:   affected area elevated   dressing monitored     Problem: Glycemic Control Impaired (Sepsis/Septic Shock)  Goal: Blood Glucose Level Within Desired Range  Outcome: Ongoing, Progressing  Intervention: Optimize Glycemic Control  Flowsheets (Taken 9/2/2023 0434)  Glycemic Management:   blood glucose monitored   supplemental insulin given     Problem: Infection  Goal: Absence of Infection Signs and Symptoms  Outcome: Ongoing, Progressing  Intervention: Prevent or Manage Infection  Flowsheets (Taken 9/2/2023 0434)  Fever Reduction/Comfort Measures:   fluid intake increased   lightweight bedding   lightweight clothing  Infection Management: aseptic technique maintained  Isolation Precautions:   precautions maintained   contact     Problem: Impaired Wound Healing  Goal: Optimal Wound Healing  Outcome: Ongoing, Progressing  Intervention: Promote Wound Healing  Flowsheets (Taken 9/2/2023 0434)  Oral Nutrition Promotion:   calorie-dense foods provided   calorie-dense liquids provided  Sleep/Rest Enhancement:   awakenings minimized   consistent schedule promoted   relaxation techniques promoted   regular sleep/rest pattern promoted  Pain Management Interventions:   care clustered   medication offered but refused   pain management plan reviewed with patient/caregiver   pillow support provided   position adjusted   quiet environment facilitated   relaxation techniques promoted

## 2023-09-03 LAB
ANION GAP SERPL CALC-SCNC: 9 MEQ/L
BASOPHILS # BLD AUTO: 0.03 X10(3)/MCL
BASOPHILS NFR BLD AUTO: 0.5 %
BUN SERPL-MCNC: 32.4 MG/DL (ref 8.4–25.7)
CALCIUM SERPL-MCNC: 8.8 MG/DL (ref 8.4–10.2)
CHLORIDE SERPL-SCNC: 111 MMOL/L (ref 98–107)
CO2 SERPL-SCNC: 24 MMOL/L (ref 22–29)
CREAT SERPL-MCNC: 1.11 MG/DL (ref 0.73–1.18)
CREAT/UREA NIT SERPL: 29
EOSINOPHIL # BLD AUTO: 0.35 X10(3)/MCL (ref 0–0.9)
EOSINOPHIL NFR BLD AUTO: 6.2 %
ERYTHROCYTE [DISTWIDTH] IN BLOOD BY AUTOMATED COUNT: 17.6 % (ref 11.5–17)
GFR SERPLBLD CREATININE-BSD FMLA CKD-EPI: >60 MLS/MIN/1.73/M2
GLUCOSE SERPL-MCNC: 121 MG/DL (ref 74–100)
HCT VFR BLD AUTO: 31.6 % (ref 42–52)
HGB BLD-MCNC: 9.4 G/DL (ref 14–18)
IMM GRANULOCYTES # BLD AUTO: 0.01 X10(3)/MCL (ref 0–0.04)
IMM GRANULOCYTES NFR BLD AUTO: 0.2 %
LYMPHOCYTES # BLD AUTO: 2.14 X10(3)/MCL (ref 0.6–4.6)
LYMPHOCYTES NFR BLD AUTO: 37.7 %
MCH RBC QN AUTO: 25.9 PG (ref 27–31)
MCHC RBC AUTO-ENTMCNC: 29.7 G/DL (ref 33–36)
MCV RBC AUTO: 87.1 FL (ref 80–94)
MONOCYTES # BLD AUTO: 0.39 X10(3)/MCL (ref 0.1–1.3)
MONOCYTES NFR BLD AUTO: 6.9 %
NEUTROPHILS # BLD AUTO: 2.76 X10(3)/MCL (ref 2.1–9.2)
NEUTROPHILS NFR BLD AUTO: 48.5 %
PLATELET # BLD AUTO: 220 X10(3)/MCL (ref 130–400)
PMV BLD AUTO: 10.1 FL (ref 7.4–10.4)
POCT GLUCOSE: 101 MG/DL (ref 70–110)
POCT GLUCOSE: 172 MG/DL (ref 70–110)
POCT GLUCOSE: 189 MG/DL (ref 70–110)
POCT GLUCOSE: 243 MG/DL (ref 70–110)
POTASSIUM SERPL-SCNC: 4.5 MMOL/L (ref 3.5–5.1)
RBC # BLD AUTO: 3.63 X10(6)/MCL (ref 4.7–6.1)
SODIUM SERPL-SCNC: 144 MMOL/L (ref 136–145)
VANCOMYCIN TROUGH SERPL-MCNC: 21.5 UG/ML (ref 15–20)
WBC # SPEC AUTO: 5.68 X10(3)/MCL (ref 4.5–11.5)

## 2023-09-03 PROCEDURE — 80202 ASSAY OF VANCOMYCIN: CPT | Performed by: STUDENT IN AN ORGANIZED HEALTH CARE EDUCATION/TRAINING PROGRAM

## 2023-09-03 PROCEDURE — 94799 UNLISTED PULMONARY SVC/PX: CPT

## 2023-09-03 PROCEDURE — 85025 COMPLETE CBC W/AUTO DIFF WBC: CPT | Performed by: FAMILY MEDICINE

## 2023-09-03 PROCEDURE — 25000003 PHARM REV CODE 250: Performed by: STUDENT IN AN ORGANIZED HEALTH CARE EDUCATION/TRAINING PROGRAM

## 2023-09-03 PROCEDURE — 63600175 PHARM REV CODE 636 W HCPCS: Performed by: STUDENT IN AN ORGANIZED HEALTH CARE EDUCATION/TRAINING PROGRAM

## 2023-09-03 PROCEDURE — 99900035 HC TECH TIME PER 15 MIN (STAT)

## 2023-09-03 PROCEDURE — 63600175 PHARM REV CODE 636 W HCPCS: Performed by: FAMILY MEDICINE

## 2023-09-03 PROCEDURE — 80048 BASIC METABOLIC PNL TOTAL CA: CPT | Performed by: FAMILY MEDICINE

## 2023-09-03 PROCEDURE — 11000004 HC SNF PRIVATE

## 2023-09-03 PROCEDURE — 27000207 HC ISOLATION

## 2023-09-03 PROCEDURE — 94760 N-INVAS EAR/PLS OXIMETRY 1: CPT

## 2023-09-03 RX ORDER — QUETIAPINE FUMARATE 50 MG/1
50 TABLET, FILM COATED ORAL NIGHTLY
Status: DISCONTINUED | OUTPATIENT
Start: 2023-09-03 | End: 2023-09-04

## 2023-09-03 RX ADMIN — CEFEPIME 2 G: 2 INJECTION, POWDER, FOR SOLUTION INTRAVENOUS at 10:09

## 2023-09-03 RX ADMIN — INSULIN ASPART 7 UNITS: 100 INJECTION, SOLUTION INTRAVENOUS; SUBCUTANEOUS at 09:09

## 2023-09-03 RX ADMIN — OXYCODONE HYDROCHLORIDE AND ACETAMINOPHEN 500 MG: 500 TABLET ORAL at 08:09

## 2023-09-03 RX ADMIN — SODIUM CHLORIDE: 9 INJECTION, SOLUTION INTRAVENOUS at 05:09

## 2023-09-03 RX ADMIN — CEFEPIME 2 G: 2 INJECTION, POWDER, FOR SOLUTION INTRAVENOUS at 02:09

## 2023-09-03 RX ADMIN — FERROUS SULFATE TAB 325 MG (65 MG ELEMENTAL FE) 1 EACH: 325 (65 FE) TAB at 08:09

## 2023-09-03 RX ADMIN — VANCOMYCIN HYDROCHLORIDE 500 MG: 500 INJECTION, POWDER, LYOPHILIZED, FOR SOLUTION INTRAVENOUS at 07:09

## 2023-09-03 RX ADMIN — QUETIAPINE FUMARATE 50 MG: 50 TABLET ORAL at 08:09

## 2023-09-03 RX ADMIN — ENOXAPARIN SODIUM 105 MG: 120 INJECTION SUBCUTANEOUS at 08:09

## 2023-09-03 RX ADMIN — INSULIN DETEMIR 20 UNITS: 100 INJECTION, SOLUTION SUBCUTANEOUS at 08:09

## 2023-09-03 RX ADMIN — MULTIPLE VITAMINS W/ MINERALS TAB 1 TABLET: TAB at 08:09

## 2023-09-03 RX ADMIN — CEFEPIME 2 G: 2 INJECTION, POWDER, FOR SOLUTION INTRAVENOUS at 05:09

## 2023-09-03 RX ADMIN — INSULIN ASPART 7 UNITS: 100 INJECTION, SOLUTION INTRAVENOUS; SUBCUTANEOUS at 11:09

## 2023-09-03 RX ADMIN — SENNOSIDES AND DOCUSATE SODIUM 2 TABLET: 8.6; 5 TABLET ORAL at 08:09

## 2023-09-03 RX ADMIN — ESCITALOPRAM 5 MG: 5 TABLET, FILM COATED ORAL at 08:09

## 2023-09-03 RX ADMIN — SODIUM CHLORIDE: 9 INJECTION, SOLUTION INTRAVENOUS at 07:09

## 2023-09-03 RX ADMIN — FLUCONAZOLE 200 MG: 200 TABLET ORAL at 08:09

## 2023-09-03 RX ADMIN — CARVEDILOL 12.5 MG: 12.5 TABLET, FILM COATED ORAL at 05:09

## 2023-09-03 RX ADMIN — SODIUM CHLORIDE: 9 INJECTION, SOLUTION INTRAVENOUS at 10:09

## 2023-09-03 RX ADMIN — SITAGLIPTIN 100 MG: 50 TABLET, FILM COATED ORAL at 08:09

## 2023-09-03 RX ADMIN — CARVEDILOL 12.5 MG: 12.5 TABLET, FILM COATED ORAL at 08:09

## 2023-09-03 RX ADMIN — SODIUM CHLORIDE: 9 INJECTION, SOLUTION INTRAVENOUS at 02:09

## 2023-09-03 RX ADMIN — INSULIN ASPART 7 UNITS: 100 INJECTION, SOLUTION INTRAVENOUS; SUBCUTANEOUS at 05:09

## 2023-09-03 NOTE — PLAN OF CARE
Problem: Diabetes Comorbidity  Goal: Blood Glucose Level Within Targeted Range  Outcome: Ongoing, Progressing  Intervention: Monitor and Manage Glycemia  Flowsheets (Taken 9/2/2023 2334)  Glycemic Management:   blood glucose monitored   supplemental insulin given     Problem: Adjustment to Illness (Sepsis/Septic Shock)  Goal: Optimal Coping  Outcome: Ongoing, Progressing  Intervention: Optimize Psychosocial Adjustment to Illness  Flowsheets (Taken 9/2/2023 2334)  Supportive Measures:   active listening utilized   self-care encouraged   positive reinforcement provided   verbalization of feelings encouraged   relaxation techniques promoted  Family/Support System Care:   caregiver stress acknowledged   presence promoted   involvement promoted   support provided     Problem: Bleeding (Sepsis/Septic Shock)  Goal: Absence of Bleeding  Outcome: Ongoing, Progressing  Intervention: Monitor and Manage Bleeding  Flowsheets (Taken 9/2/2023 2334)  Bleeding Precautions:   monitored for signs of bleeding   blood pressure closely monitored  Bleeding Management:   dressing monitored   packing maintained     Problem: Glycemic Control Impaired (Sepsis/Septic Shock)  Goal: Blood Glucose Level Within Desired Range  Outcome: Ongoing, Progressing     Problem: Infection Progression (Sepsis/Septic Shock)  Goal: Absence of Infection Signs and Symptoms  Outcome: Ongoing, Progressing  Intervention: Initiate Sepsis Management  Flowsheets (Taken 9/2/2023 2334)  Infection Prevention:   cohorting utilized   environmental surveillance performed   equipment surfaces disinfected   hand hygiene promoted   rest/sleep promoted   single patient room provided   personal protective equipment utilized  Infection Management: aseptic technique maintained  Stabilization Measures: legs elevated  Isolation Precautions:   contact   precautions maintained  Intervention: Promote Stabilization  Flowsheets (Taken 9/2/2023 2334)  Fever Reduction/Comfort Measures:    fluid intake increased   lightweight bedding   lightweight clothing  Fluid/Electrolyte Management: fluids provided  Intervention: Promote Recovery  Flowsheets (Taken 9/2/2023 2000)  Sleep/Rest Enhancement:   awakenings minimized   consistent schedule promoted   room darkened   relaxation techniques promoted   noise level reduced   regular sleep/rest pattern promoted     Problem: Infection Progression (Sepsis/Septic Shock)  Goal: Absence of Infection Signs and Symptoms  Intervention: Initiate Sepsis Management  Flowsheets (Taken 9/2/2023 2334)  Infection Prevention:   cohorting utilized   environmental surveillance performed   equipment surfaces disinfected   hand hygiene promoted   rest/sleep promoted   single patient room provided   personal protective equipment utilized  Infection Management: aseptic technique maintained  Stabilization Measures: legs elevated  Isolation Precautions:   contact   precautions maintained     Problem: Infection Progression (Sepsis/Septic Shock)  Goal: Absence of Infection Signs and Symptoms  Intervention: Promote Stabilization  Flowsheets (Taken 9/2/2023 2334)  Fever Reduction/Comfort Measures:   fluid intake increased   lightweight bedding   lightweight clothing  Fluid/Electrolyte Management: fluids provided     Problem: Infection Progression (Sepsis/Septic Shock)  Goal: Absence of Infection Signs and Symptoms  Intervention: Promote Recovery  Flowsheets (Taken 9/2/2023 2000)  Sleep/Rest Enhancement:   awakenings minimized   consistent schedule promoted   room darkened   relaxation techniques promoted   noise level reduced   regular sleep/rest pattern promoted     Problem: Nutrition Impaired (Sepsis/Septic Shock)  Goal: Optimal Nutrition Intake  Outcome: Ongoing, Progressing     Problem: Impaired Wound Healing  Goal: Optimal Wound Healing  Outcome: Ongoing, Progressing  Intervention: Promote Wound Healing  Flowsheets (Taken 9/2/2023 2200)  Oral Nutrition Promotion:   calorie-dense  foods provided   calorie-dense liquids provided   rest periods promoted  Sleep/Rest Enhancement:   awakenings minimized   consistent schedule promoted   noise level reduced   relaxation techniques promoted   regular sleep/rest pattern promoted  Pain Management Interventions:   care clustered   pillow support provided   position adjusted   medication offered but refused   pain management plan reviewed with patient/caregiver   quiet environment facilitated   relaxation techniques promoted
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
## 2023-09-03 NOTE — PLAN OF CARE
Problem: Adult Inpatient Plan of Care  Goal: Plan of Care Review  Outcome: Ongoing, Progressing  Flowsheets (Taken 9/3/2023 1514)  Plan of Care Reviewed With:   patient   mother  Goal: Patient-Specific Goal (Individualized)  Outcome: Ongoing, Progressing  Flowsheets (Taken 9/3/2023 1514)  Anxieties, Fears or Concerns: None verbalized at this time  Individualized Care Needs: Bowel regimen, wound care, monitor for left hip bleeding, IV abx, monitor CBG, RBC infusion  Goal: Absence of Hospital-Acquired Illness or Injury  Outcome: Ongoing, Progressing  Intervention: Identify and Manage Fall Risk  Flowsheets (Taken 9/3/2023 1514)  Safety Promotion/Fall Prevention:   assistive device/personal item within reach   bed alarm set   side rails raised x 3  Intervention: Prevent Skin Injury  Flowsheets (Taken 9/3/2023 1514)  Body Position:   supine   heels elevated  Skin Protection:   adhesive use limited   incontinence pads utilized   tubing/devices free from skin contact  Intervention: Prevent and Manage VTE (Venous Thromboembolism) Risk  Flowsheets (Taken 9/3/2023 1514)  Activity Management: Patient unable to perform activities  VTE Prevention/Management:   bleeding risk assessed   bleeding precations maintained  Range of Motion: ROM (range of motion) performed  Intervention: Prevent Infection  Flowsheets (Taken 9/3/2023 1514)  Infection Prevention:   cohorting utilized   environmental surveillance performed   equipment surfaces disinfected   hand hygiene promoted   personal protective equipment utilized   rest/sleep promoted   single patient room provided  Goal: Optimal Comfort and Wellbeing  Outcome: Ongoing, Progressing  Intervention: Monitor Pain and Promote Comfort  Flowsheets (Taken 9/3/2023 1514)  Pain Management Interventions:   care clustered   pillow support provided   quiet environment facilitated  Intervention: Provide Person-Centered Care  Flowsheets (Taken 9/3/2023 1514)  Trust Relationship/Rapport:   care  explained   choices provided   emotional support provided   empathic listening provided   questions answered   questions encouraged   reassurance provided   thoughts/feelings acknowledged  Goal: Readiness for Transition of Care  Outcome: Ongoing, Progressing     Problem: Diabetes Comorbidity  Goal: Blood Glucose Level Within Targeted Range  Outcome: Ongoing, Progressing     Problem: Adjustment to Illness (Sepsis/Septic Shock)  Goal: Optimal Coping  Outcome: Ongoing, Progressing     Problem: Bleeding (Sepsis/Septic Shock)  Goal: Absence of Bleeding  Outcome: Ongoing, Progressing     Problem: Glycemic Control Impaired (Sepsis/Septic Shock)  Goal: Blood Glucose Level Within Desired Range  Outcome: Ongoing, Progressing     Problem: Infection Progression (Sepsis/Septic Shock)  Goal: Absence of Infection Signs and Symptoms  Outcome: Ongoing, Progressing     Problem: Nutrition Impaired (Sepsis/Septic Shock)  Goal: Optimal Nutrition Intake  Outcome: Ongoing, Progressing     Problem: Infection  Goal: Absence of Infection Signs and Symptoms  Outcome: Ongoing, Progressing     Problem: Impaired Wound Healing  Goal: Optimal Wound Healing  Outcome: Ongoing, Progressing     Problem: Skin Injury Risk Increased  Goal: Skin Health and Integrity  Outcome: Ongoing, Progressing     Problem: Fall Injury Risk  Goal: Absence of Fall and Fall-Related Injury  Outcome: Ongoing, Progressing

## 2023-09-03 NOTE — PROGRESS NOTES
Pharmacokinetic Assessment Follow Up: IV Vancomycin    Vancomycin serum concentration assessment(s):    The trough level was drawn correctly and can be used to guide therapy at this time. The measurement is above the desired definitive target range of 15 to 20 mcg/mL.    Vancomycin Regimen Plan:  Patient was on Vancomycin 750 mg IV every 24 hours  Change regimen to Vancomycin 500 mg IV every 24 hours with next serum trough concentration measured at 1800 prior to 3rd dose on 09/05      Drug levels (last 3 results):  Recent Labs   Lab Result Units 09/03/23  1717   Vancomycin Trough ug/ml 21.5*       Vancomycin Administrations:  vancomycin given in the last 96 hours                     vancomycin 750 mg in dextrose 5 % (D5W) 250 mL IVPB (Vial-Mate) (mg) 750 mg New Bag 09/02/23 1828     750 mg New Bag 09/01/23 1832     750 mg New Bag 08/31/23 1550                    Pharmacy will continue to follow and monitor vancomycin.    Please contact pharmacy at extension [phone] for questions regarding this assessment.    Thank you for the consult,   Rodger Boothe       Patient brief summary:  Antonio Galvan II is a 57 y.o. male initiated on antimicrobial therapy with IV Vancomycin for treatment of Multiple sites of infected wound  -Cultures from the right hip grew Bacteroides, MRSA, Pseudomonas      The patient's current regimen is 500 mg IV every 24 hours    Drug Allergies:   Review of patient's allergies indicates:  No Known Allergies    Actual Body Weight:   106.6 kg    Renal Function:   Estimated Creatinine Clearance: 89 mL/min (based on SCr of 1.11 mg/dL).,     Dialysis Method (if applicable):  N/A    CBC (last 72 hours):  Recent Labs   Lab Result Units 09/01/23  0414 09/02/23  0415 09/03/23  0412   WBC x10(3)/mcL 6.83 5.20 5.68   Hgb g/dL 6.9* 8.9* 9.4*   Hct % 23.7* 29.7* 31.6*   Platelet x10(3)/mcL 207 190 220   Mono % % 6.7 8.7 6.9   Eos % % 2.8 4.0 6.2   Basophil % % 0.3 0.4 0.5       Metabolic Panel (last 72  hours):  Recent Labs   Lab Result Units 09/01/23  0414 09/02/23  0415 09/03/23  0412   Sodium Level mmol/L 137 136 144   Potassium Level mmol/L 4.8 4.7 4.5   Chloride mmol/L 108* 108* 111*   Carbon Dioxide mmol/L 23 21* 24   Glucose Level mg/dL 143* 231* 121*   Blood Urea Nitrogen mg/dL 35.9* 34.8* 32.4*   Creatinine mg/dL 1.25* 1.28* 1.11       Microbiologic Results:  Microbiology Results (last 7 days)       ** No results found for the last 168 hours. **

## 2023-09-04 LAB
POCT GLUCOSE: 140 MG/DL (ref 70–110)
POCT GLUCOSE: 157 MG/DL (ref 70–110)
POCT GLUCOSE: 184 MG/DL (ref 70–110)
POCT GLUCOSE: 194 MG/DL (ref 70–110)
POCT GLUCOSE: 197 MG/DL (ref 70–110)

## 2023-09-04 PROCEDURE — 25000003 PHARM REV CODE 250: Performed by: STUDENT IN AN ORGANIZED HEALTH CARE EDUCATION/TRAINING PROGRAM

## 2023-09-04 PROCEDURE — 63600175 PHARM REV CODE 636 W HCPCS: Performed by: STUDENT IN AN ORGANIZED HEALTH CARE EDUCATION/TRAINING PROGRAM

## 2023-09-04 PROCEDURE — 27000207 HC ISOLATION

## 2023-09-04 PROCEDURE — 97110 THERAPEUTIC EXERCISES: CPT | Mod: CQ

## 2023-09-04 PROCEDURE — 11000004 HC SNF PRIVATE

## 2023-09-04 PROCEDURE — 99900035 HC TECH TIME PER 15 MIN (STAT)

## 2023-09-04 PROCEDURE — 94760 N-INVAS EAR/PLS OXIMETRY 1: CPT

## 2023-09-04 PROCEDURE — 63600175 PHARM REV CODE 636 W HCPCS: Performed by: FAMILY MEDICINE

## 2023-09-04 RX ORDER — QUETIAPINE FUMARATE 50 MG/1
50 TABLET, FILM COATED ORAL NIGHTLY PRN
Status: DISCONTINUED | OUTPATIENT
Start: 2023-09-04 | End: 2023-09-27 | Stop reason: HOSPADM

## 2023-09-04 RX ADMIN — INSULIN ASPART 7 UNITS: 100 INJECTION, SOLUTION INTRAVENOUS; SUBCUTANEOUS at 05:09

## 2023-09-04 RX ADMIN — ENOXAPARIN SODIUM 105 MG: 120 INJECTION SUBCUTANEOUS at 11:09

## 2023-09-04 RX ADMIN — SODIUM CHLORIDE: 9 INJECTION, SOLUTION INTRAVENOUS at 05:09

## 2023-09-04 RX ADMIN — INSULIN ASPART 7 UNITS: 100 INJECTION, SOLUTION INTRAVENOUS; SUBCUTANEOUS at 07:09

## 2023-09-04 RX ADMIN — VANCOMYCIN HYDROCHLORIDE 500 MG: 500 INJECTION, POWDER, LYOPHILIZED, FOR SOLUTION INTRAVENOUS at 07:09

## 2023-09-04 RX ADMIN — Medication 1 ENEMA: at 06:09

## 2023-09-04 RX ADMIN — SODIUM CHLORIDE: 9 INJECTION, SOLUTION INTRAVENOUS at 09:09

## 2023-09-04 RX ADMIN — FERROUS SULFATE TAB 325 MG (65 MG ELEMENTAL FE) 1 EACH: 325 (65 FE) TAB at 09:09

## 2023-09-04 RX ADMIN — CARVEDILOL 12.5 MG: 12.5 TABLET, FILM COATED ORAL at 07:09

## 2023-09-04 RX ADMIN — LIDOCAINE HYDROCHLORIDE 10 ML: 20 JELLY TOPICAL at 06:09

## 2023-09-04 RX ADMIN — INSULIN ASPART 2 UNITS: 100 INJECTION, SOLUTION INTRAVENOUS; SUBCUTANEOUS at 07:09

## 2023-09-04 RX ADMIN — INSULIN ASPART 2 UNITS: 100 INJECTION, SOLUTION INTRAVENOUS; SUBCUTANEOUS at 12:09

## 2023-09-04 RX ADMIN — SITAGLIPTIN 100 MG: 50 TABLET, FILM COATED ORAL at 09:09

## 2023-09-04 RX ADMIN — OXYCODONE HYDROCHLORIDE AND ACETAMINOPHEN 500 MG: 500 TABLET ORAL at 09:09

## 2023-09-04 RX ADMIN — SODIUM CHLORIDE: 9 INJECTION, SOLUTION INTRAVENOUS at 02:09

## 2023-09-04 RX ADMIN — INSULIN ASPART 7 UNITS: 100 INJECTION, SOLUTION INTRAVENOUS; SUBCUTANEOUS at 12:09

## 2023-09-04 RX ADMIN — TRAMADOL HYDROCHLORIDE 50 MG: 50 TABLET, COATED ORAL at 12:09

## 2023-09-04 RX ADMIN — INSULIN DETEMIR 20 UNITS: 100 INJECTION, SOLUTION SUBCUTANEOUS at 08:09

## 2023-09-04 RX ADMIN — CEFEPIME 2 G: 2 INJECTION, POWDER, FOR SOLUTION INTRAVENOUS at 02:09

## 2023-09-04 RX ADMIN — MULTIPLE VITAMINS W/ MINERALS TAB 1 TABLET: TAB at 09:09

## 2023-09-04 RX ADMIN — ENOXAPARIN SODIUM 105 MG: 120 INJECTION SUBCUTANEOUS at 08:09

## 2023-09-04 RX ADMIN — SODIUM CHLORIDE: 9 INJECTION, SOLUTION INTRAVENOUS at 07:09

## 2023-09-04 RX ADMIN — INSULIN ASPART 2 UNITS: 100 INJECTION, SOLUTION INTRAVENOUS; SUBCUTANEOUS at 05:09

## 2023-09-04 RX ADMIN — CEFEPIME 2 G: 2 INJECTION, POWDER, FOR SOLUTION INTRAVENOUS at 09:09

## 2023-09-04 RX ADMIN — CEFEPIME 2 G: 2 INJECTION, POWDER, FOR SOLUTION INTRAVENOUS at 05:09

## 2023-09-04 RX ADMIN — QUETIAPINE FUMARATE 50 MG: 50 TABLET ORAL at 10:09

## 2023-09-04 RX ADMIN — ESCITALOPRAM 5 MG: 5 TABLET, FILM COATED ORAL at 09:09

## 2023-09-04 RX ADMIN — CARVEDILOL 12.5 MG: 12.5 TABLET, FILM COATED ORAL at 06:09

## 2023-09-04 RX ADMIN — FLUCONAZOLE 200 MG: 200 TABLET ORAL at 09:09

## 2023-09-04 NOTE — PLAN OF CARE
Problem: Adult Inpatient Plan of Care  Goal: Plan of Care Review  Outcome: Ongoing, Progressing  Flowsheets (Taken 9/4/2023 1247)  Plan of Care Reviewed With:   patient   caregiver  Goal: Patient-Specific Goal (Individualized)  Outcome: Ongoing, Progressing  Flowsheets (Taken 9/4/2023 1247)  Anxieties, Fears or Concerns: bowel regimen, sleep meds  Individualized Care Needs: bowel regimen done this am on night shift as scheduled, seroquel given last night and was helpful, wound care, insulin given as scheduled, IV abt  Goal: Absence of Hospital-Acquired Illness or Injury  Outcome: Ongoing, Progressing  Intervention: Identify and Manage Fall Risk  Flowsheets (Taken 9/4/2023 1247)  Safety Promotion/Fall Prevention:   assistive device/personal item within reach   family to remain at bedside   medications reviewed   instructed to call staff for mobility   room near unit station  Intervention: Prevent Skin Injury  Flowsheets (Taken 9/4/2023 1247)  Body Position: turned  Skin Protection: incontinence pads utilized  Goal: Optimal Comfort and Wellbeing  Outcome: Ongoing, Progressing  Goal: Readiness for Transition of Care  Outcome: Ongoing, Progressing     Problem: Diabetes Comorbidity  Goal: Blood Glucose Level Within Targeted Range  Outcome: Ongoing, Progressing     Problem: Adjustment to Illness (Sepsis/Septic Shock)  Goal: Optimal Coping  Outcome: Ongoing, Progressing     Problem: Bleeding (Sepsis/Septic Shock)  Goal: Absence of Bleeding  Outcome: Ongoing, Progressing     Problem: Glycemic Control Impaired (Sepsis/Septic Shock)  Goal: Blood Glucose Level Within Desired Range  Outcome: Ongoing, Progressing     Problem: Infection Progression (Sepsis/Septic Shock)  Goal: Absence of Infection Signs and Symptoms  Outcome: Ongoing, Progressing     Problem: Nutrition Impaired (Sepsis/Septic Shock)  Goal: Optimal Nutrition Intake  Outcome: Ongoing, Progressing     Problem: Infection  Goal: Absence of Infection Signs and  Symptoms  Outcome: Ongoing, Progressing     Problem: Impaired Wound Healing  Goal: Optimal Wound Healing  Outcome: Ongoing, Progressing     Problem: Skin Injury Risk Increased  Goal: Skin Health and Integrity  Outcome: Ongoing, Progressing     Problem: Fall Injury Risk  Goal: Absence of Fall and Fall-Related Injury  Outcome: Ongoing, Progressing

## 2023-09-04 NOTE — PT/OT/SLP PROGRESS
Physical Therapy Treatment Note           Name: Antonio Galvan II    : 1965 (57 y.o.)  MRN: [id number]           TREATMENT SUMMARY AND RECOMMENDATIONS:    PT Received On: 23  PT Start Time: 1030  PT Stop Time: 1045  PT Total Time (min): 15 min       Subjective Assessment:  x No complaints  Lethargic / sleepy   x Awake, alert, cooperative  Uncooperative    Agitated  c/o pain    Appropriate  c/o fatigue    Confused x Treated at bedside     Emotionally labile  Treated in gym/dept.    Impulsive  Other:    Flat affect       Therapy Precautions:    Cognitive deficits  Spinal precautions    Collar - hard  Sternal precautions    Collar - soft   TLSO    Fall risk  LSO    Hip precautions - posterior  Knee immobilizer    Hip precautions - anterior  WBAT    Impaired communication  Partial weightbearing    Oxygen  TTWB    PEG tube  NWB    Visual deficits x Other: perineural catheter, PICC line, pressure relief boots     Hearing deficits  x Quadriplegia        Treatment Objectives:     Mobility Training:   Assist level Comments    Bed mobility     Transfer     Gait     Sit to stand transitions     Sitting balance     Standing balance      Wheelchair mobility     Car transfer     Other:          Therapeutic Exercise:   Exercise Sets Reps Comments   PROM to B UE and LE, all joints and digits   All functional planes to tolerance with sustained hold at end ranges                         Additional Comments:  ROM tasks completed to prevent further contractures and ensure ability for positioning in WC, along with proper positioning to reduce further skin break down. R sided tone high with contractures noted in elbows, hips, and knees.    Assessment: Patient tolerated session well.    PT Plan: continue per POC  Revisions made to plan of care: No    GOALS:   Multidisciplinary Problems       Physical Therapy Goals          Problem: Physical Therapy    Goal Priority Disciplines Outcome Goal Variances Interventions    Physical Therapy Goal     PT, PT/OT Ongoing, Progressing     Description: Goals to be met by: Discharge     Patient will increase functional independence with mobility by performin.  Pt to receive PROM BLEs and UEs 5-6 d/wk, qd,  to prevent further contractures and ensure ability for positioning in WC, along with proper positioning to reduce further skin break down  2.  Pt to be assessed for appropriateness for out of bed to WC - awaiting wound care                       Skilled PT Minutes Provided: 16   Communication with Treatment Team:     Equipment recommendations:       At end of treatment, patient remained:   Up in chair     Up in wheelchair in room   x In bed    With alarm activated   x Bed rails up   x Call bell in reach     Family/friends present    Restraints secured properly    In bathroom with CNA/RN notified   x Nurse aware    In gym with therapist/tech    Other:
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
## 2023-09-04 NOTE — PLAN OF CARE
Problem: Adult Inpatient Plan of Care  Goal: Patient-Specific Goal (Individualized)  Flowsheets (Taken 9/3/2023 2100)  Anxieties, Fears or Concerns: Concerned about getting a good nights sleep tonight, pt is aware of New Order for Seroquel, his first dose is tonight.  Individualized Care Needs: Continue bowel regimen, IV abx, monitor CBG's, continue wound care as ordered and monitor Left Hip wound for bleeding since Lovenox was restarted today.     Problem: Diabetes Comorbidity  Goal: Blood Glucose Level Within Targeted Range  Intervention: Monitor and Manage Glycemia  Flowsheets (Taken 9/3/2023 2100)  Glycemic Management:   blood glucose monitored   oral hydration promoted   supplemental insulin given     Problem: Infection  Goal: Absence of Infection Signs and Symptoms  Intervention: Prevent or Manage Infection  Flowsheets (Taken 9/3/2023 2152)  Fever Reduction/Comfort Measures:   fluid intake increased   lightweight bedding  Infection Management: aseptic technique maintained  Isolation Precautions:   contact   precautions maintained     Problem: Skin Injury Risk Increased  Goal: Skin Health and Integrity  Intervention: Optimize Skin Protection  Flowsheets (Taken 9/3/2023 2152)  Pressure Reduction Techniques:   frequent weight shift encouraged   weight shift assistance provided  Pressure Reduction Devices: positioning supports utilized  Skin Protection: skin sealant/moisture barrier applied  Head of Bed (HOB) Positioning: HOB elevated  Intervention: Promote and Optimize Oral Intake  Flowsheets (Taken 9/3/2023 2100)  Oral Nutrition Promotion: ([place] BID ordered also)   calorie-dense foods provided   calorie-dense liquids provided   other (see comments)     Problem: Fall Injury Risk  Goal: Absence of Fall and Fall-Related Injury  Intervention: Identify and Manage Contributors  Flowsheets (Taken 9/3/2023 2152)  Self-Care Promotion:   BADL personal objects within reach   meal set-up provided   safe use of adaptive  equipment encouraged  Medication Review/Management: medications reviewed

## 2023-09-04 NOTE — PLAN OF CARE
[facility]  Discharge Reassessment    Primary Care Provider: Jennifer Fernando NP    Expected Discharge Date:     Reassessment (most recent)       Discharge Reassessment - 09/04/23 1412          Discharge Reassessment    Assessment Type Discharge Planning Reassessment     Did the patient's condition or plan change since previous assessment? No     Discharge Plan discussed with: Parent(s)     Name(s) and Number(s) Judy mother      Communicated SADE with patient/caregiver Date not available/Unable to determine     Discharge Plan A Home with family;Home Health     DME Needed Upon Discharge  none     Transition of Care Barriers None     Why the patient remains in the hospital Requires continued medical care        Post-Acute Status    Post-Acute Authorization Home Health     Home Health Status Pending medical clearance/testing     Hospital Resources/Appts/Education Provided Provided patient/caregiver with written discharge plan information     Discharge Delays None known at this time

## 2023-09-05 LAB
POCT GLUCOSE: 140 MG/DL (ref 70–110)
POCT GLUCOSE: 172 MG/DL (ref 70–110)
POCT GLUCOSE: 175 MG/DL (ref 70–110)
POCT GLUCOSE: 94 MG/DL (ref 70–110)
VANCOMYCIN TROUGH SERPL-MCNC: 19.2 UG/ML (ref 15–20)

## 2023-09-05 PROCEDURE — 25000003 PHARM REV CODE 250: Performed by: STUDENT IN AN ORGANIZED HEALTH CARE EDUCATION/TRAINING PROGRAM

## 2023-09-05 PROCEDURE — 27000207 HC ISOLATION

## 2023-09-05 PROCEDURE — 99900035 HC TECH TIME PER 15 MIN (STAT)

## 2023-09-05 PROCEDURE — 11000004 HC SNF PRIVATE

## 2023-09-05 PROCEDURE — 80202 ASSAY OF VANCOMYCIN: CPT | Performed by: STUDENT IN AN ORGANIZED HEALTH CARE EDUCATION/TRAINING PROGRAM

## 2023-09-05 PROCEDURE — 97110 THERAPEUTIC EXERCISES: CPT

## 2023-09-05 PROCEDURE — 63600175 PHARM REV CODE 636 W HCPCS: Performed by: STUDENT IN AN ORGANIZED HEALTH CARE EDUCATION/TRAINING PROGRAM

## 2023-09-05 PROCEDURE — A4216 STERILE WATER/SALINE, 10 ML: HCPCS | Performed by: STUDENT IN AN ORGANIZED HEALTH CARE EDUCATION/TRAINING PROGRAM

## 2023-09-05 PROCEDURE — 63600175 PHARM REV CODE 636 W HCPCS: Performed by: FAMILY MEDICINE

## 2023-09-05 PROCEDURE — 94760 N-INVAS EAR/PLS OXIMETRY 1: CPT

## 2023-09-05 RX ADMIN — INSULIN ASPART 7 UNITS: 100 INJECTION, SOLUTION INTRAVENOUS; SUBCUTANEOUS at 12:09

## 2023-09-05 RX ADMIN — SODIUM CHLORIDE: 9 INJECTION, SOLUTION INTRAVENOUS at 07:09

## 2023-09-05 RX ADMIN — FLUCONAZOLE 200 MG: 200 TABLET ORAL at 09:09

## 2023-09-05 RX ADMIN — CEFEPIME 2 G: 2 INJECTION, POWDER, FOR SOLUTION INTRAVENOUS at 10:09

## 2023-09-05 RX ADMIN — CARVEDILOL 12.5 MG: 12.5 TABLET, FILM COATED ORAL at 09:09

## 2023-09-05 RX ADMIN — OXYCODONE HYDROCHLORIDE AND ACETAMINOPHEN 500 MG: 500 TABLET ORAL at 09:09

## 2023-09-05 RX ADMIN — ESCITALOPRAM 5 MG: 5 TABLET, FILM COATED ORAL at 09:09

## 2023-09-05 RX ADMIN — ENOXAPARIN SODIUM 105 MG: 120 INJECTION SUBCUTANEOUS at 09:09

## 2023-09-05 RX ADMIN — SODIUM CHLORIDE: 9 INJECTION, SOLUTION INTRAVENOUS at 10:09

## 2023-09-05 RX ADMIN — INSULIN ASPART 7 UNITS: 100 INJECTION, SOLUTION INTRAVENOUS; SUBCUTANEOUS at 05:09

## 2023-09-05 RX ADMIN — TRAMADOL HYDROCHLORIDE 50 MG: 50 TABLET, COATED ORAL at 02:09

## 2023-09-05 RX ADMIN — INSULIN DETEMIR 20 UNITS: 100 INJECTION, SOLUTION SUBCUTANEOUS at 09:09

## 2023-09-05 RX ADMIN — Medication 10 ML: at 06:09

## 2023-09-05 RX ADMIN — CEFEPIME 2 G: 2 INJECTION, POWDER, FOR SOLUTION INTRAVENOUS at 02:09

## 2023-09-05 RX ADMIN — VANCOMYCIN HYDROCHLORIDE 500 MG: 500 INJECTION, POWDER, LYOPHILIZED, FOR SOLUTION INTRAVENOUS at 07:09

## 2023-09-05 RX ADMIN — MULTIPLE VITAMINS W/ MINERALS TAB 1 TABLET: TAB at 09:09

## 2023-09-05 RX ADMIN — CEFEPIME 2 G: 2 INJECTION, POWDER, FOR SOLUTION INTRAVENOUS at 05:09

## 2023-09-05 RX ADMIN — FERROUS SULFATE TAB 325 MG (65 MG ELEMENTAL FE) 1 EACH: 325 (65 FE) TAB at 09:09

## 2023-09-05 RX ADMIN — SITAGLIPTIN 100 MG: 50 TABLET, FILM COATED ORAL at 09:09

## 2023-09-05 RX ADMIN — QUETIAPINE FUMARATE 50 MG: 50 TABLET ORAL at 09:09

## 2023-09-05 RX ADMIN — SODIUM CHLORIDE: 9 INJECTION, SOLUTION INTRAVENOUS at 05:09

## 2023-09-05 RX ADMIN — Medication 10 ML: at 11:09

## 2023-09-05 RX ADMIN — SODIUM CHLORIDE: 9 INJECTION, SOLUTION INTRAVENOUS at 02:09

## 2023-09-05 RX ADMIN — CARVEDILOL 12.5 MG: 12.5 TABLET, FILM COATED ORAL at 05:09

## 2023-09-05 NOTE — PLAN OF CARE
Problem: Adult Inpatient Plan of Care  Goal: Plan of Care Review  Outcome: Ongoing, Progressing  Flowsheets (Taken 9/5/2023 1827)  Plan of Care Reviewed With:   patient   caregiver   family  Goal: Patient-Specific Goal (Individualized)  Outcome: Ongoing, Progressing  Flowsheets (Taken 9/5/2023 1827)  Anxieties, Fears or Concerns: None expressed  Individualized Care Needs: monitor cbg, turn pt q2h, abx therapy  Goal: Absence of Hospital-Acquired Illness or Injury  Outcome: Ongoing, Progressing  Intervention: Identify and Manage Fall Risk  Flowsheets (Taken 9/5/2023 1827)  Safety Promotion/Fall Prevention:   assistive device/personal item within reach   bed alarm set   family to remain at bedside   side rails raised x 3  Intervention: Prevent Skin Injury  Flowsheets (Taken 9/5/2023 1827)  Body Position: turned  Skin Protection:   adhesive use limited   incontinence pads utilized   tubing/devices free from skin contact  Intervention: Prevent and Manage VTE (Venous Thromboembolism) Risk  Flowsheets (Taken 9/5/2023 1827)  Activity Management: Patient unable to perform activities  VTE Prevention/Management: bleeding precations maintained  Range of Motion: other (see comments)  Intervention: Prevent Infection  Flowsheets (Taken 9/5/2023 1827)  Infection Prevention:   cohorting utilized   environmental surveillance performed   equipment surfaces disinfected   hand hygiene promoted   personal protective equipment utilized   rest/sleep promoted  Goal: Optimal Comfort and Wellbeing  Outcome: Ongoing, Progressing  Intervention: Monitor Pain and Promote Comfort  Flowsheets (Taken 9/5/2023 1827)  Pain Management Interventions:   pillow support provided   position adjusted  Intervention: Provide Person-Centered Care  Flowsheets (Taken 9/5/2023 1827)  Trust Relationship/Rapport:   care explained   choices provided   emotional support provided   empathic listening provided   questions answered   questions encouraged  Goal:  Readiness for Transition of Care  Outcome: Ongoing, Progressing  Intervention: Mutually Develop Transition Plan  Flowsheets (Taken 9/5/2023 1827)  Communicated SADE with patient/caregiver: Date not available/Unable to determine     Problem: Diabetes Comorbidity  Goal: Blood Glucose Level Within Targeted Range  Outcome: Ongoing, Progressing  Intervention: Monitor and Manage Glycemia  Flowsheets (Taken 9/5/2023 1827)  Glycemic Management:   blood glucose monitored   supplemental insulin given     Problem: Adjustment to Illness (Sepsis/Septic Shock)  Goal: Optimal Coping  Outcome: Ongoing, Progressing  Intervention: Optimize Psychosocial Adjustment to Illness  Flowsheets (Taken 9/5/2023 1827)  Supportive Measures: active listening utilized  Family/Support System Care: involvement promoted     Problem: Bleeding (Sepsis/Septic Shock)  Goal: Absence of Bleeding  Outcome: Ongoing, Progressing  Intervention: Monitor and Manage Bleeding  Flowsheets (Taken 9/5/2023 1827)  Bleeding Precautions: monitored for signs of bleeding  Bleeding Management: dressing monitored     Problem: Glycemic Control Impaired (Sepsis/Septic Shock)  Goal: Blood Glucose Level Within Desired Range  Outcome: Ongoing, Progressing  Intervention: Optimize Glycemic Control  Flowsheets (Taken 9/5/2023 1827)  Glycemic Management:   blood glucose monitored   supplemental insulin given     Problem: Infection Progression (Sepsis/Septic Shock)  Goal: Absence of Infection Signs and Symptoms  Outcome: Ongoing, Progressing  Intervention: Initiate Sepsis Management  Flowsheets (Taken 9/5/2023 1827)  Infection Prevention:   cohorting utilized   environmental surveillance performed   equipment surfaces disinfected   hand hygiene promoted   personal protective equipment utilized   rest/sleep promoted  Infection Management: aseptic technique maintained  Stabilization Measures: legs elevated  Isolation Precautions: precautions maintained  Intervention: Promote  Stabilization  Flowsheets (Taken 9/5/2023 1827)  Fever Reduction/Comfort Measures:   fluid intake increased   lightweight bedding  Fluid/Electrolyte Management: fluids provided  Intervention: Promote Recovery  Flowsheets (Taken 9/5/2023 1827)  Sleep/Rest Enhancement:   awakenings minimized   consistent schedule promoted   noise level reduced   family presence promoted  Airway/Ventilation Support: comfort measures provided  Activity Management: Patient unable to perform activities     Problem: Nutrition Impaired (Sepsis/Septic Shock)  Goal: Optimal Nutrition Intake  Outcome: Ongoing, Progressing     Problem: Infection  Goal: Absence of Infection Signs and Symptoms  Outcome: Ongoing, Progressing  Intervention: Prevent or Manage Infection  Flowsheets (Taken 9/5/2023 1827)  Fever Reduction/Comfort Measures:   fluid intake increased   lightweight bedding  Infection Management: aseptic technique maintained  Isolation Precautions: precautions maintained     Problem: Impaired Wound Healing  Goal: Optimal Wound Healing  Outcome: Ongoing, Progressing  Intervention: Promote Wound Healing  Flowsheets (Taken 9/5/2023 1827)  Oral Nutrition Promotion:   calorie-dense foods provided   calorie-dense liquids provided  Sleep/Rest Enhancement:   awakenings minimized   consistent schedule promoted   noise level reduced   family presence promoted  Activity Management: Patient unable to perform activities  Pain Management Interventions:   pillow support provided   position adjusted     Problem: Skin Injury Risk Increased  Goal: Skin Health and Integrity  Outcome: Ongoing, Progressing  Intervention: Optimize Skin Protection  Flowsheets (Taken 9/5/2023 1827)  Pressure Reduction Techniques: weight shift assistance provided  Pressure Reduction Devices: positioning supports utilized  Skin Protection:   adhesive use limited   incontinence pads utilized   tubing/devices free from skin contact  Head of Bed (HOB) Positioning: HOB at 30  degrees  Intervention: Promote and Optimize Oral Intake  Flowsheets (Taken 9/5/2023 1827)  Oral Nutrition Promotion:   calorie-dense foods provided   calorie-dense liquids provided     Problem: Fall Injury Risk  Goal: Absence of Fall and Fall-Related Injury  Outcome: Ongoing, Progressing  Intervention: Identify and Manage Contributors  Flowsheets (Taken 9/5/2023 1827)  Self-Care Promotion:   BADL personal objects within reach   meal set-up provided   safe use of adaptive equipment encouraged  Medication Review/Management: medications reviewed  Intervention: Promote Injury-Free Environment  Flowsheets (Taken 9/5/2023 1827)  Safety Promotion/Fall Prevention:   assistive device/personal item within reach   bed alarm set   family to remain at bedside   side rails raised x 3
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
## 2023-09-05 NOTE — PLAN OF CARE
Problem: Adult Inpatient Plan of Care  Goal: Plan of Care Review  Outcome: Ongoing, Progressing  Flowsheets (Taken 9/5/2023 0025)  Plan of Care Reviewed With: patient  Goal: Patient-Specific Goal (Individualized)  Outcome: Ongoing, Progressing  Flowsheets (Taken 9/5/2023 0025)  Anxieties, Fears or Concerns: sleep meds  Individualized Care Needs: turn q 2 and repostion throughout shift until 7a  Goal: Absence of Hospital-Acquired Illness or Injury  Outcome: Ongoing, Progressing  Intervention: Identify and Manage Fall Risk  Flowsheets (Taken 9/5/2023 0025)  Safety Promotion/Fall Prevention:   assistive device/personal item within reach   bed alarm set   Fall Risk reviewed with patient/family   side rails raised x 3  Intervention: Prevent Skin Injury  Flowsheets (Taken 9/5/2023 0025)  Body Position: sitting up in bed  Skin Protection: incontinence pads utilized  Intervention: Prevent and Manage VTE (Venous Thromboembolism) Risk  Flowsheets (Taken 9/5/2023 0025)  Activity Management: Patient unable to perform activities  VTE Prevention/Management: bleeding precations maintained  Range of Motion: other (see comments)  Intervention: Prevent Infection  Flowsheets (Taken 9/5/2023 0025)  Infection Prevention:   environmental surveillance performed   personal protective equipment utilized   single patient room provided   rest/sleep promoted   equipment surfaces disinfected   hand hygiene promoted

## 2023-09-05 NOTE — PT/OT/SLP PROGRESS
Physical Therapy Treatment Note           Name: Antonio Galvan II    : 1965 (57 y.o.)  MRN: [id number]           TREATMENT SUMMARY AND RECOMMENDATIONS:    PT Received On: 23  PT Start Time: 1305     PT Stop Time: 1335  PT Total Time (min): 30 min     Subjective Assessment:   No complaints  Lethargic   x Awake, alert, cooperative  Uncooperative    Agitated  c/o pain    Appropriate  c/o fatigue    Confused x Treated at bedside     Emotionally labile  Treated in gym/dept.    Impulsive  Other:    Flat affect       Therapy Precautions:    Cognitive deficits  Spinal precautions    Collar - hard  Sternal precautions    Collar - soft   TLSO    Fall risk  LSO    Hip precautions - posterior  Knee immobilizer    Hip precautions - anterior  WBAT    Impaired communication  Partial weightbearing    Oxygen  TTWB    PEG tube  NWB    Visual deficits x Other: perineural catheter, PICC line, pressure relief boots     Hearing deficits  x Quadriplegia        Treatment Objectives:     Mobility Training:   Assist level Comments    Bed mobility     Transfer     Gait     Sit to stand transitions     Sitting balance     Standing balance      Wheelchair mobility     Car transfer     Other:          Therapeutic Exercise:   Exercise Sets Reps Comments   PROM to B UE and LE, all joints and digits   All functional planes to tolerance with sustained hold at end ranges                         Additional Comments:  ROM tasks completed to prevent further contractures and ensure ability for positioning in WC, along with proper positioning to reduce further skin break down.    Good tolerance demonstrated.     Wound care still staying no to EOB or OOB    Assessment: Patient tolerated session well.    PT Plan: continue per POC  Revisions made to plan of care: No    GOALS:   Multidisciplinary Problems       Physical Therapy Goals          Problem: Physical Therapy    Goal Priority Disciplines Outcome Goal Variances Interventions    Physical Therapy Goal     PT, PT/OT Ongoing, Progressing     Description: Goals to be met by: Discharge     Patient will increase functional independence with mobility by performin.  Pt to receive PROM BLEs and UEs 5-6 d/wk, qd,  to prevent further contractures and ensure ability for positioning in WC, along with proper positioning to reduce further skin break down  2.  Pt to be assessed for appropriateness for out of bed to WC - awaiting wound care                       Skilled PT Minutes Provided: 25   Communication with Treatment Team:     Equipment recommendations:       At end of treatment, patient remained:   Up in chair     Up in wheelchair in room   x In bed    With alarm activated   x Bed rails up   x Call bell in reach    x Family/friends present    Restraints secured properly    In bathroom with CNA/RN notified    Nurse aware    In gym with therapist/tech    Other:

## 2023-09-05 NOTE — PROGRESS NOTES
Inpatient Nutrition Evaluation    Admit Date: 8/23/2023   Total duration of encounter: 13 days    Nutrition Recommendation/Prescription     Continue diabetic diet. 2. Continue boost glucose control TID  and Cole BID, used in-house nutrition supplement Arginaid    Nutrition Assessment     Chart Review    Reason Seen: continuous nutrition monitoring, length of stay, and follow-up    Malnutrition Screening Tool Results   Have you recently lost weight without trying?: No  Have you been eating poorly because of a decreased appetite?: No   MST Score: 0     Diagnosis:  Left anterior foot eschar unstageable ulcer   Left posterior heel eschar unstageable ulcer  Large right posterior hip gluteal ulcer stage IV   Stage II sacral decubitus ulcer   Left lateral hip stage IV ulcer   Multiple sites of infected wound  Candiduria   Normocytic anemia  Atrial flutter   H/O RUEX (PICC assoc?) DVT  Quadriplegia   Insulin-dependent diabetes mellitus   Insomnia   Relevant Medical History: A-fib  Date Unknown  Cardiac arrest   Date Unknown  Chronic skin ulcer  Date Unknown  DM (diabetes mellitus)  Date Unknown  Infected decubitus ulcer  Date Unknown  Lymphedema of leg  Date Unknown  Neurogenic bladder  Date Unknown  Obesity, unspecified  Date Unknown  Pressure ulcer of heel  Date Unknown  Pressure ulcer of right foot, stage 3  Date Unknown  Pressure ulcer of right ischium  Date Unknown  Quadriplegia  Date Unknown  Quadriplegic spinal paralysis  Balaji as Reviewed    Nutrition-Related Medications: ascorbic acid, cefepime, ferrous sulfate, fluconazole, insulin aspart, insulin detemir, multivitamin, senna-docusate, vancomycin    Nutrition-Related Labs:   Latest Reference Range & Units 09/05/23 11:16   POCT Glucose 70 - 110 mg/dL 175 (H)   (H): Data is abnormally high    Diet Order: Diet diabetic  Oral Supplement Order: Boost Glucose Control and Cole  Appetite/Oral Intake: good/% of meals  Factors Affecting Nutritional Intake:  open  "wound to left hip  Food/Scientologist/Cultural Preferences: none reported  Food Allergies: none reported    Skin Integrity: wound  Wound(s):      Altered Skin Integrity 05/06/22 1140 Right Heel  Full thickness tissue loss. Subcutaneous fat may be visible but bone, tendon or muscle are not exposed-Tissue loss description: Full thickness       Altered Skin Integrity 01/12/23 1530 Sacral spine Full thickness tissue loss with exposed bone, tendon, or muscle. Often includes undermining and tunneling. May extend into muscle and/or supporting structures.-Tissue loss description: Full thickness       Altered Skin Integrity 08/01/23 1535 Left anterior Ankle Other (comment) Full thickness tissue loss. Base is covered by slough and/or eschar in the wound bed-Tissue loss description: Full thickness       Altered Skin Integrity 08/01/23 1534 Left posterior Ankle Full thickness tissue loss. Base is covered by slough and/or eschar in the wound bed-Tissue loss description: Full thickness     Comments    Pt intake is good. Discussed food preferences. Pt request to mostly wants sandwiches. Marked menus. Will monitor.     Anthropometrics    Height: 5' 7.99" (172.7 cm) Height Method: Stated  Last Weight: 106.6 kg (235 lb 0.2 oz) (08/23/23 1605) Weight Method: Bed Scale  BMI (Calculated): 35.7  BMI Classification: obese grade II (BMI 35-39.9)        Ideal Body Weight (IBW), Male: 153.94 lb     % Ideal Body Weight, Male (lb): 152.66 %                          Usual Weight Provided By: unable to obtain usual weight    Wt Readings from Last 5 Encounters:   08/23/23 106.6 kg (235 lb 0.2 oz)   08/16/23 106.4 kg (234 lb 9.6 oz)   08/15/23 113.4 kg (250 lb)   08/14/23 108.9 kg (240 lb)   06/23/23 100.7 kg (222 lb 0.1 oz)     Weight Change(s) Since Admission:  Admit Weight: 106.6 kg (235 lb 0.2 oz) (08/23/23 1605)  No new wt recorded. Notified clinical nurse manager.     Patient Education    Not applicable.    Monitoring & Evaluation     Dietitian " will monitor food and beverage intake, weight, weight change, electrolyte/renal panel, glucose/endocrine profile, and gastrointestinal profile.  Nutrition Risk/Follow-Up: low (follow-up in 5-7 days)  Patients assigned 'low nutrition risk' status do not qualify for a full nutritional assessment but will be monitored and re-evaluated in a 5-7 day time period. Please consult if re-evaluation needed sooner.

## 2023-09-06 LAB
ABO + RH BLD: NORMAL
ABO + RH BLD: NORMAL
ANION GAP SERPL CALC-SCNC: 9 MEQ/L
BASOPHILS # BLD AUTO: 0.03 X10(3)/MCL
BASOPHILS NFR BLD AUTO: 0.5 %
BLD PROD TYP BPU: NORMAL
BLD PROD TYP BPU: NORMAL
BLOOD UNIT EXPIRATION DATE: NORMAL
BLOOD UNIT EXPIRATION DATE: NORMAL
BLOOD UNIT TYPE CODE: 7300
BLOOD UNIT TYPE CODE: 7300
BUN SERPL-MCNC: 35.5 MG/DL (ref 8.4–25.7)
CALCIUM SERPL-MCNC: 8.2 MG/DL (ref 8.4–10.2)
CHLORIDE SERPL-SCNC: 111 MMOL/L (ref 98–107)
CO2 SERPL-SCNC: 22 MMOL/L (ref 22–29)
CREAT SERPL-MCNC: 1.2 MG/DL (ref 0.73–1.18)
CREAT/UREA NIT SERPL: 30
CROSSMATCH INTERPRETATION: NORMAL
CROSSMATCH INTERPRETATION: NORMAL
DISPENSE STATUS: NORMAL
DISPENSE STATUS: NORMAL
EOSINOPHIL # BLD AUTO: 0.37 X10(3)/MCL (ref 0–0.9)
EOSINOPHIL NFR BLD AUTO: 5.8 %
ERYTHROCYTE [DISTWIDTH] IN BLOOD BY AUTOMATED COUNT: 18.1 % (ref 11.5–17)
GFR SERPLBLD CREATININE-BSD FMLA CKD-EPI: >60 MLS/MIN/1.73/M2
GLUCOSE SERPL-MCNC: 176 MG/DL (ref 74–100)
GROUP & RH: NORMAL
HCT VFR BLD AUTO: 26.2 % (ref 42–52)
HGB BLD-MCNC: 7.9 G/DL (ref 14–18)
IMM GRANULOCYTES # BLD AUTO: 0.03 X10(3)/MCL (ref 0–0.04)
IMM GRANULOCYTES NFR BLD AUTO: 0.5 %
INDIRECT COOMBS GEL: NORMAL
LYMPHOCYTES # BLD AUTO: 2.83 X10(3)/MCL (ref 0.6–4.6)
LYMPHOCYTES NFR BLD AUTO: 44.1 %
MAGNESIUM SERPL-MCNC: 2 MG/DL (ref 1.6–2.6)
MCH RBC QN AUTO: 27.6 PG (ref 27–31)
MCHC RBC AUTO-ENTMCNC: 30.2 G/DL (ref 33–36)
MCV RBC AUTO: 91.6 FL (ref 80–94)
MONOCYTES # BLD AUTO: 0.4 X10(3)/MCL (ref 0.1–1.3)
MONOCYTES NFR BLD AUTO: 6.2 %
NEUTROPHILS # BLD AUTO: 2.75 X10(3)/MCL (ref 2.1–9.2)
NEUTROPHILS NFR BLD AUTO: 42.9 %
PLATELET # BLD AUTO: 246 X10(3)/MCL (ref 130–400)
PMV BLD AUTO: 10.2 FL (ref 7.4–10.4)
POCT GLUCOSE: 151 MG/DL (ref 70–110)
POCT GLUCOSE: 163 MG/DL (ref 70–110)
POCT GLUCOSE: 174 MG/DL (ref 70–110)
POCT GLUCOSE: 189 MG/DL (ref 70–110)
POTASSIUM SERPL-SCNC: 4.9 MMOL/L (ref 3.5–5.1)
RBC # BLD AUTO: 2.86 X10(6)/MCL (ref 4.7–6.1)
SODIUM SERPL-SCNC: 142 MMOL/L (ref 136–145)
SPECIMEN OUTDATE: NORMAL
UNIT NUMBER: NORMAL
UNIT NUMBER: NORMAL
WBC # SPEC AUTO: 6.41 X10(3)/MCL (ref 4.5–11.5)

## 2023-09-06 PROCEDURE — 86920 COMPATIBILITY TEST SPIN: CPT | Performed by: STUDENT IN AN ORGANIZED HEALTH CARE EDUCATION/TRAINING PROGRAM

## 2023-09-06 PROCEDURE — 99900035 HC TECH TIME PER 15 MIN (STAT)

## 2023-09-06 PROCEDURE — 25000003 PHARM REV CODE 250: Performed by: STUDENT IN AN ORGANIZED HEALTH CARE EDUCATION/TRAINING PROGRAM

## 2023-09-06 PROCEDURE — 63600175 PHARM REV CODE 636 W HCPCS: Performed by: STUDENT IN AN ORGANIZED HEALTH CARE EDUCATION/TRAINING PROGRAM

## 2023-09-06 PROCEDURE — 97535 SELF CARE MNGMENT TRAINING: CPT

## 2023-09-06 PROCEDURE — 94760 N-INVAS EAR/PLS OXIMETRY 1: CPT

## 2023-09-06 PROCEDURE — 27000207 HC ISOLATION

## 2023-09-06 PROCEDURE — P9016 RBC LEUKOCYTES REDUCED: HCPCS | Performed by: STUDENT IN AN ORGANIZED HEALTH CARE EDUCATION/TRAINING PROGRAM

## 2023-09-06 PROCEDURE — 11000004 HC SNF PRIVATE

## 2023-09-06 PROCEDURE — 85025 COMPLETE CBC W/AUTO DIFF WBC: CPT | Performed by: STUDENT IN AN ORGANIZED HEALTH CARE EDUCATION/TRAINING PROGRAM

## 2023-09-06 PROCEDURE — 86900 BLOOD TYPING SEROLOGIC ABO: CPT | Performed by: STUDENT IN AN ORGANIZED HEALTH CARE EDUCATION/TRAINING PROGRAM

## 2023-09-06 PROCEDURE — 80048 BASIC METABOLIC PNL TOTAL CA: CPT | Performed by: STUDENT IN AN ORGANIZED HEALTH CARE EDUCATION/TRAINING PROGRAM

## 2023-09-06 PROCEDURE — 83735 ASSAY OF MAGNESIUM: CPT | Performed by: STUDENT IN AN ORGANIZED HEALTH CARE EDUCATION/TRAINING PROGRAM

## 2023-09-06 RX ORDER — HYDROCODONE BITARTRATE AND ACETAMINOPHEN 500; 5 MG/1; MG/1
TABLET ORAL
Status: DISCONTINUED | OUTPATIENT
Start: 2023-09-06 | End: 2023-09-27 | Stop reason: HOSPADM

## 2023-09-06 RX ORDER — SODIUM CHLORIDE 9 MG/ML
INJECTION, SOLUTION INTRAVENOUS CONTINUOUS
Status: DISCONTINUED | OUTPATIENT
Start: 2023-09-06 | End: 2023-09-07

## 2023-09-06 RX ADMIN — FERROUS SULFATE TAB 325 MG (65 MG ELEMENTAL FE) 1 EACH: 325 (65 FE) TAB at 08:09

## 2023-09-06 RX ADMIN — CARVEDILOL 12.5 MG: 12.5 TABLET, FILM COATED ORAL at 04:09

## 2023-09-06 RX ADMIN — INSULIN ASPART 7 UNITS: 100 INJECTION, SOLUTION INTRAVENOUS; SUBCUTANEOUS at 04:09

## 2023-09-06 RX ADMIN — FLUCONAZOLE 200 MG: 200 TABLET ORAL at 08:09

## 2023-09-06 RX ADMIN — INSULIN ASPART 7 UNITS: 100 INJECTION, SOLUTION INTRAVENOUS; SUBCUTANEOUS at 11:09

## 2023-09-06 RX ADMIN — SODIUM CHLORIDE: 9 INJECTION, SOLUTION INTRAVENOUS at 04:09

## 2023-09-06 RX ADMIN — SODIUM CHLORIDE: 9 INJECTION, SOLUTION INTRAVENOUS at 02:09

## 2023-09-06 RX ADMIN — CEFEPIME 2 G: 2 INJECTION, POWDER, FOR SOLUTION INTRAVENOUS at 09:09

## 2023-09-06 RX ADMIN — SODIUM CHLORIDE: 9 INJECTION, SOLUTION INTRAVENOUS at 07:09

## 2023-09-06 RX ADMIN — VANCOMYCIN HYDROCHLORIDE 500 MG: 500 INJECTION, POWDER, LYOPHILIZED, FOR SOLUTION INTRAVENOUS at 07:09

## 2023-09-06 RX ADMIN — HYDROCODONE BITARTRATE AND ACETAMINOPHEN 1 TABLET: 10; 325 TABLET ORAL at 11:09

## 2023-09-06 RX ADMIN — SODIUM CHLORIDE: 9 INJECTION, SOLUTION INTRAVENOUS at 01:09

## 2023-09-06 RX ADMIN — SITAGLIPTIN 100 MG: 50 TABLET, FILM COATED ORAL at 08:09

## 2023-09-06 RX ADMIN — MULTIPLE VITAMINS W/ MINERALS TAB 1 TABLET: TAB at 08:09

## 2023-09-06 RX ADMIN — SODIUM CHLORIDE: 9 INJECTION, SOLUTION INTRAVENOUS at 09:09

## 2023-09-06 RX ADMIN — QUETIAPINE FUMARATE 50 MG: 50 TABLET ORAL at 08:09

## 2023-09-06 RX ADMIN — OXYCODONE HYDROCHLORIDE AND ACETAMINOPHEN 500 MG: 500 TABLET ORAL at 08:09

## 2023-09-06 RX ADMIN — ESCITALOPRAM 5 MG: 5 TABLET, FILM COATED ORAL at 08:09

## 2023-09-06 RX ADMIN — INSULIN DETEMIR 20 UNITS: 100 INJECTION, SOLUTION SUBCUTANEOUS at 08:09

## 2023-09-06 RX ADMIN — CEFEPIME 2 G: 2 INJECTION, POWDER, FOR SOLUTION INTRAVENOUS at 02:09

## 2023-09-06 RX ADMIN — CEFEPIME 2 G: 2 INJECTION, POWDER, FOR SOLUTION INTRAVENOUS at 01:09

## 2023-09-06 NOTE — PHYSICIAN QUERY
PT Name: Antonio Galvan II  MR #: [id number]     Documentation Clarification      CDS/: Rody Isaacs RN BSN             Contact information:[email or web address]    This form is a permanent document in the medical record.     Query Date: September 6, 2023    By submitting this query, we are merely seeking further clarification of documentation. Please utilize your independent clinical judgment when addressing the question(s) below.    The Medical Record reflects the following:    Supporting Clinical Findings Location in Medical Record   8/16 Op  Post-Operative Diagnosis: Post-Op Diagnosis Codes:      Osteomyelitis, unspecified site, unspecified type  1. Excisional debridement skin to bone, skin to bone with biopsy and debridement of hard bone at the Left hip necrotic wound 11 cm long 5-1/2 cm wide 4-1/2 cm deep upon completion of debridement     Patient underwent excisional debridement left hip skin to bone 5-1/2 x 4-1/2 cm x 11 cm long.  Patient had debridement done was 15 blade scalpel curette and biting rongeur.  Hemostasis with Bovie cautery.  Cultures were obtained aerobic anaerobic Gram stain.  Wet-to-dry saline dressings were applied 8/16/23 Op                                                                                Provider, please clarify Excisional Debridement bone Left hip    [   ] Pelvic bone (includes iliac crest, ilium, ischium region)   [  x ] Left Upper Femur (includes greater/lesser trochanters)   [   ] Left hip joint   [   ] Other (please specify): ____________

## 2023-09-06 NOTE — PT/OT/SLP PROGRESS
Name: Antonio Dumontfam PATTERSON    : 1965 (57 y.o.)  MRN: [id number]           Patient Unavailable      Patient unable to be seen at this time secondary to: receiving blood. PT monitor and return if able.
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
## 2023-09-06 NOTE — PLAN OF CARE
Problem: Adult Inpatient Plan of Care  Goal: Plan of Care Review  Outcome: Ongoing, Progressing  Flowsheets (Taken 9/6/2023 1345)  Plan of Care Reviewed With:   patient   caregiver   family  Goal: Patient-Specific Goal (Individualized)  Outcome: Ongoing, Progressing  Flowsheets (Taken 9/6/2023 1345)  Anxieties, Fears or Concerns: None expressed  Individualized Care Needs: Monitor BP and CBG, turn pt q2h, abx therapy  Goal: Absence of Hospital-Acquired Illness or Injury  Outcome: Ongoing, Progressing  Intervention: Identify and Manage Fall Risk  Flowsheets (Taken 9/6/2023 1345)  Safety Promotion/Fall Prevention:   assistive device/personal item within reach   bed alarm set  Intervention: Prevent Skin Injury  Flowsheets (Taken 9/6/2023 1345)  Body Position: position maintained  Skin Protection:   adhesive use limited   incontinence pads utilized   tubing/devices free from skin contact  Intervention: Prevent and Manage VTE (Venous Thromboembolism) Risk  Flowsheets (Taken 9/6/2023 1345)  Activity Management: Patient unable to perform activities  VTE Prevention/Management:   bleeding precations maintained   bleeding risk assessed  Range of Motion: ROM (range of motion) performed  Intervention: Prevent Infection  Flowsheets (Taken 9/6/2023 1345)  Infection Prevention:   cohorting utilized   environmental surveillance performed   equipment surfaces disinfected   hand hygiene promoted   personal protective equipment utilized   rest/sleep promoted   single patient room provided  Goal: Optimal Comfort and Wellbeing  Outcome: Ongoing, Progressing  Goal: Readiness for Transition of Care  Outcome: Ongoing, Progressing     Problem: Diabetes Comorbidity  Goal: Blood Glucose Level Within Targeted Range  Outcome: Ongoing, Progressing     Problem: Adjustment to Illness (Sepsis/Septic Shock)  Goal: Optimal Coping  Outcome: Ongoing, Progressing     Problem: Bleeding (Sepsis/Septic Shock)  Goal: Absence of Bleeding  Outcome: Ongoing,  Progressing     Problem: Glycemic Control Impaired (Sepsis/Septic Shock)  Goal: Blood Glucose Level Within Desired Range  Outcome: Ongoing, Progressing     Problem: Infection Progression (Sepsis/Septic Shock)  Goal: Absence of Infection Signs and Symptoms  Outcome: Ongoing, Progressing     Problem: Nutrition Impaired (Sepsis/Septic Shock)  Goal: Optimal Nutrition Intake  Outcome: Ongoing, Progressing     Problem: Infection  Goal: Absence of Infection Signs and Symptoms  Outcome: Ongoing, Progressing     Problem: Impaired Wound Healing  Goal: Optimal Wound Healing  Outcome: Ongoing, Progressing     Problem: Skin Injury Risk Increased  Goal: Skin Health and Integrity  Outcome: Ongoing, Progressing     Problem: Fall Injury Risk  Goal: Absence of Fall and Fall-Related Injury  Outcome: Ongoing, Progressing

## 2023-09-06 NOTE — PLAN OF CARE
[facility]  Discharge Reassessment    Primary Care Provider: Jennifer Fernando NP    Expected Discharge Date:     Reassessment (most recent)       Discharge Reassessment - 09/06/23 1202          Discharge Reassessment    Assessment Type Discharge Planning Reassessment     Did the patient's condition or plan change since previous assessment? No     Discharge Plan discussed with: Parent(s)     Name(s) and Number(s) Judy echeverria [phone]     Communicated SADE with patient/caregiver Date not available/Unable to determine     Discharge Plan A Home with family;Home Health     DME Needed Upon Discharge  none     Transition of Care Barriers None     Why the patient remains in the hospital Requires continued medical care        Post-Acute Status    Post-Acute Authorization Home Health     Home Health Status Pending medical clearance/testing     Hospital Resources/Appts/Education Provided Provided patient/caregiver with written discharge plan information     Discharge Delays None known at this time

## 2023-09-06 NOTE — PROGRESS NOTES
Pharmacokinetic Assessment Follow Up: IV Vancomycin    Vancomycin serum concentration assessment(s):    The trough level was drawn correctly and can be used to guide therapy at this time. The measurement is within the desired definitive target range of 15 to 20 mcg/mL.    Vancomycin Regimen Plan:    Continue regimen to Vancomycin 500 mg IV every 24 hours with next serum trough concentration measured at 1800 prior to the dose on 09/08    Drug levels (last 3 results):  Recent Labs   Lab Result Units 09/03/23  1717 09/05/23  1819   Vancomycin Trough ug/ml 21.5* 19.2       Pharmacy will continue to follow and monitor vancomycin.    Please contact pharmacy at extension [phone] for questions regarding this assessment.    Thank you for the consult,   Juan M Ramirez       Patient brief summary:  Antonio Galvan II is a 57 y.o. male initiated on antimicrobial therapy with IV Vancomycin for treatment of multiple site of infected wound    The patient's current regimen is 500mg q24H    Drug Allergies:   Review of patient's allergies indicates:  No Known Allergies    Actual Body Weight:   106.6kg    Renal Function:   Estimated Creatinine Clearance: 89 mL/min (based on SCr of 1.11 mg/dL).,     Dialysis Method (if applicable):  N/A    CBC (last 72 hours):  Recent Labs   Lab Result Units 09/03/23  0412   WBC x10(3)/mcL 5.68   Hgb g/dL 9.4*   Hct % 31.6*   Platelet x10(3)/mcL 220   Mono % % 6.9   Eos % % 6.2   Basophil % % 0.5       Metabolic Panel (last 72 hours):  Recent Labs   Lab Result Units 09/03/23  0412   Sodium Level mmol/L 144   Potassium Level mmol/L 4.5   Chloride mmol/L 111*   Carbon Dioxide mmol/L 24   Glucose Level mg/dL 121*   Blood Urea Nitrogen mg/dL 32.4*   Creatinine mg/dL 1.11       Vancomycin Administrations:  vancomycin given in the last 96 hours                     vancomycin (VANCOCIN) 500 mg in dextrose 5 % in water (D5W) 100 mL IVPB (MB+) (mg) 500 mg New Bag 09/04/23 1906     500 mg New Bag 09/03/23 1913     vancomycin 750 mg in dextrose 5 % (D5W) 250 mL IVPB (Vial-Mate) (mg) 750 mg New Bag 09/02/23 6366                    Microbiologic Results:  Microbiology Results (last 7 days)       ** No results found for the last 168 hours. **

## 2023-09-06 NOTE — PROGRESS NOTES
Name: Antonio Galvan II    : 1965 (57 y.o.)  MRN: [id number]            Interdisciplinary Team Conference     Case conference held with patient/family and care team to discuss progress, plan of care, barriers to be addressed for safe return home, equipment recommendations, and discharge planning. Communicated therapy progress with MD, RN, therapy clinicians and case management. All questions/concerns answered.

## 2023-09-06 NOTE — PLAN OF CARE
Weekly Staffing Report      Date Admitted: 8/23/2023 :   Staffing Date: 9/6/2023     Patient Active Problem List   Diagnosis    Cardiac arrest    Uncontrolled type 2 diabetes mellitus with hyperglycemia    Atrial flutter    Severe sepsis    Acute hypoxemic respiratory failure    Constipation    Abnormal urinalysis    Obstructive sleep apnea syndrome    Acute deep vein thrombosis (DVT) of brachial vein of right upper extremity    Quadriplegia    Intolerance of continuous positive airway pressure (CPAP) ventilation    Complex sleep apnea syndrome    Open wound of left hip    Pressure injury of right ischium, stage 4    Osteomyelitis    Sacral decubitus ulcer, stage II    Chronic blood loss anemia          Team Members Present:       Nursing Present     Yes [x]    No []    Physical Therapy Present    Yes [x]    No []    Occupational Therapy Present     Yes [x]    No []    Speech Therapy Present    Yes []    No []    NA [x]    Dietary Present    Yes [x]    No []        Physician Present   [] Dm Acevedo    [x] Thairy Reyes    [] SHRAVAN Soares    [] SIDRA Donohue     [] SHAWN Avila      Family Present    [] Adult Children    [] Spouse    [] POA    [] Friend/ Caregiver    [x] Other- Mother present       Interdisciplinary Meeting Notes:  Ot - does not see patient. PT- ROM exercises for patient. Nutritionally- intake better. Medically- Receiving blood due to symptoms and H&H. All questions answered at this time                 Please see Documented PT/OT/ST, Dietary, Nursing, and Physician notes for detailed treatment information.

## 2023-09-06 NOTE — NURSING
02:00- went into pt room to hang cefepime and hourly round; noticed pt was sitting in a pool of copious amount of blood. Alerted charge nurse. We applied pressure to the wound and applied a pressure dressing. Took pt vitals once the blood was contained at 0330 and blood pressure was noted to be 69/58. We put pt in trend position and alerted md. Md stated to run NS at 100 ml/hr, run a type and screen and if the hemoglobin was less than 7 to hang blood. She also wants to hold lovenox at this time. Followed out md's orders and pt seems stable and comfortable. Pt was getting type and screened, lab was having trouble drawing pt labs md stated it was okay to draw from picc. Will continue to monitor pt. See MD, Thiary Reyes, for orders

## 2023-09-07 LAB
ANION GAP SERPL CALC-SCNC: 7 MEQ/L
BASOPHILS # BLD AUTO: 0.04 X10(3)/MCL
BASOPHILS NFR BLD AUTO: 0.7 %
BUN SERPL-MCNC: 47.9 MG/DL (ref 8.4–25.7)
CALCIUM SERPL-MCNC: 7.8 MG/DL (ref 8.4–10.2)
CHLORIDE SERPL-SCNC: 108 MMOL/L (ref 98–107)
CO2 SERPL-SCNC: 23 MMOL/L (ref 22–29)
CREAT SERPL-MCNC: 1.55 MG/DL (ref 0.73–1.18)
CREAT/UREA NIT SERPL: 31
EOSINOPHIL # BLD AUTO: 0.3 X10(3)/MCL (ref 0–0.9)
EOSINOPHIL NFR BLD AUTO: 5.1 %
ERYTHROCYTE [DISTWIDTH] IN BLOOD BY AUTOMATED COUNT: 17.9 % (ref 11.5–17)
GFR SERPLBLD CREATININE-BSD FMLA CKD-EPI: 53 MLS/MIN/1.73/M2
GLUCOSE SERPL-MCNC: 135 MG/DL (ref 74–100)
HCT VFR BLD AUTO: 29.7 % (ref 42–52)
HGB BLD-MCNC: 9.1 G/DL (ref 14–18)
IMM GRANULOCYTES # BLD AUTO: 0.03 X10(3)/MCL (ref 0–0.04)
IMM GRANULOCYTES NFR BLD AUTO: 0.5 %
LYMPHOCYTES # BLD AUTO: 2.4 X10(3)/MCL (ref 0.6–4.6)
LYMPHOCYTES NFR BLD AUTO: 40.7 %
MCH RBC QN AUTO: 27 PG (ref 27–31)
MCHC RBC AUTO-ENTMCNC: 30.6 G/DL (ref 33–36)
MCV RBC AUTO: 88.1 FL (ref 80–94)
MONOCYTES # BLD AUTO: 0.53 X10(3)/MCL (ref 0.1–1.3)
MONOCYTES NFR BLD AUTO: 9 %
NEUTROPHILS # BLD AUTO: 2.6 X10(3)/MCL (ref 2.1–9.2)
NEUTROPHILS NFR BLD AUTO: 44 %
PLATELET # BLD AUTO: 191 X10(3)/MCL (ref 130–400)
PMV BLD AUTO: 10.7 FL (ref 7.4–10.4)
POCT GLUCOSE: 167 MG/DL (ref 70–110)
POCT GLUCOSE: 199 MG/DL (ref 70–110)
POTASSIUM SERPL-SCNC: 5.3 MMOL/L (ref 3.5–5.1)
RBC # BLD AUTO: 3.37 X10(6)/MCL (ref 4.7–6.1)
SODIUM SERPL-SCNC: 138 MMOL/L (ref 136–145)
WBC # SPEC AUTO: 5.9 X10(3)/MCL (ref 4.5–11.5)

## 2023-09-07 PROCEDURE — 27000207 HC ISOLATION

## 2023-09-07 PROCEDURE — 63600175 PHARM REV CODE 636 W HCPCS: Performed by: STUDENT IN AN ORGANIZED HEALTH CARE EDUCATION/TRAINING PROGRAM

## 2023-09-07 PROCEDURE — 99900035 HC TECH TIME PER 15 MIN (STAT)

## 2023-09-07 PROCEDURE — 94760 N-INVAS EAR/PLS OXIMETRY 1: CPT

## 2023-09-07 PROCEDURE — 97110 THERAPEUTIC EXERCISES: CPT

## 2023-09-07 PROCEDURE — 11000004 HC SNF PRIVATE

## 2023-09-07 PROCEDURE — 85025 COMPLETE CBC W/AUTO DIFF WBC: CPT | Performed by: STUDENT IN AN ORGANIZED HEALTH CARE EDUCATION/TRAINING PROGRAM

## 2023-09-07 PROCEDURE — 25000003 PHARM REV CODE 250: Performed by: STUDENT IN AN ORGANIZED HEALTH CARE EDUCATION/TRAINING PROGRAM

## 2023-09-07 PROCEDURE — 80048 BASIC METABOLIC PNL TOTAL CA: CPT | Performed by: STUDENT IN AN ORGANIZED HEALTH CARE EDUCATION/TRAINING PROGRAM

## 2023-09-07 RX ORDER — SODIUM CHLORIDE 9 MG/ML
INJECTION, SOLUTION INTRAVENOUS CONTINUOUS
Status: ACTIVE | OUTPATIENT
Start: 2023-09-07 | End: 2023-09-07

## 2023-09-07 RX ADMIN — FERROUS SULFATE TAB 325 MG (65 MG ELEMENTAL FE) 1 EACH: 325 (65 FE) TAB at 09:09

## 2023-09-07 RX ADMIN — CARVEDILOL 12.5 MG: 12.5 TABLET, FILM COATED ORAL at 09:09

## 2023-09-07 RX ADMIN — VANCOMYCIN HYDROCHLORIDE 500 MG: 500 INJECTION, POWDER, LYOPHILIZED, FOR SOLUTION INTRAVENOUS at 07:09

## 2023-09-07 RX ADMIN — ESCITALOPRAM 5 MG: 5 TABLET, FILM COATED ORAL at 09:09

## 2023-09-07 RX ADMIN — MULTIPLE VITAMINS W/ MINERALS TAB 1 TABLET: TAB at 09:09

## 2023-09-07 RX ADMIN — SODIUM CHLORIDE: 9 INJECTION, SOLUTION INTRAVENOUS at 09:09

## 2023-09-07 RX ADMIN — CEFEPIME 2 G: 2 INJECTION, POWDER, FOR SOLUTION INTRAVENOUS at 05:09

## 2023-09-07 RX ADMIN — OXYCODONE HYDROCHLORIDE AND ACETAMINOPHEN 500 MG: 500 TABLET ORAL at 09:09

## 2023-09-07 RX ADMIN — INSULIN DETEMIR 20 UNITS: 100 INJECTION, SOLUTION SUBCUTANEOUS at 09:09

## 2023-09-07 RX ADMIN — CEFEPIME 2 G: 2 INJECTION, POWDER, FOR SOLUTION INTRAVENOUS at 02:09

## 2023-09-07 RX ADMIN — CEFEPIME 2 G: 2 INJECTION, POWDER, FOR SOLUTION INTRAVENOUS at 09:09

## 2023-09-07 RX ADMIN — INSULIN ASPART 1 UNITS: 100 INJECTION, SOLUTION INTRAVENOUS; SUBCUTANEOUS at 10:09

## 2023-09-07 RX ADMIN — INSULIN ASPART 7 UNITS: 100 INJECTION, SOLUTION INTRAVENOUS; SUBCUTANEOUS at 09:09

## 2023-09-07 RX ADMIN — INSULIN ASPART 7 UNITS: 100 INJECTION, SOLUTION INTRAVENOUS; SUBCUTANEOUS at 04:09

## 2023-09-07 RX ADMIN — FLUCONAZOLE 200 MG: 200 TABLET ORAL at 09:09

## 2023-09-07 RX ADMIN — SITAGLIPTIN 100 MG: 50 TABLET, FILM COATED ORAL at 09:09

## 2023-09-07 RX ADMIN — CARVEDILOL 12.5 MG: 12.5 TABLET, FILM COATED ORAL at 04:09

## 2023-09-07 RX ADMIN — SODIUM CHLORIDE: 9 INJECTION, SOLUTION INTRAVENOUS at 05:09

## 2023-09-07 RX ADMIN — SODIUM ZIRCONIUM CYCLOSILICATE 10 G: 10 POWDER, FOR SUSPENSION ORAL at 09:09
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
## 2023-09-07 NOTE — NURSING
Nurses Note -- 4 Eyes      9/6/2023   10:49 PM      Skin assessed during: Daily Assessment      [] No Altered Skin Integrity Present    []Prevention Measures Documented      [x] Yes- Altered Skin Integrity Present or Discovered   [x] LDA Added if Not in Epic (Describe Wound)   [x] New Altered Skin Integrity was Present on Admit and Documented in LDA   [x] Wound Image Taken    Wound Care Consulted? Yes    Attending Nurse:  Christa Montano RN/Staff Member:   Shivam MADDOX

## 2023-09-07 NOTE — PLAN OF CARE
Problem: Adult Inpatient Plan of Care  Goal: Plan of Care Review  Outcome: Ongoing, Progressing  Flowsheets (Taken 9/7/2023 1736)  Plan of Care Reviewed With:   patient   caregiver  Goal: Patient-Specific Goal (Individualized)  Outcome: Ongoing, Progressing  Flowsheets (Taken 9/7/2023 1736)  Anxieties, Fears or Concerns: None expressed  Individualized Care Needs: Monitor BP and CBG, turn pt q2h, abx therapy  Goal: Absence of Hospital-Acquired Illness or Injury  Outcome: Ongoing, Progressing  Intervention: Identify and Manage Fall Risk  Flowsheets (Taken 9/7/2023 1736)  Safety Promotion/Fall Prevention:   assistive device/personal item within reach   nonskid shoes/socks when out of bed   bed alarm set   side rails raised x 3  Intervention: Prevent Skin Injury  Flowsheets (Taken 9/7/2023 1736)  Body Position:   sitting up in bed   position maintained  Skin Protection:   adhesive use limited   incontinence pads utilized   tubing/devices free from skin contact  Intervention: Prevent and Manage VTE (Venous Thromboembolism) Risk  Flowsheets (Taken 9/7/2023 1736)  Activity Management: Patient unable to perform activities  VTE Prevention/Management:   bleeding risk assessed   ROM (passive) performed  Range of Motion: ROM (range of motion) performed  Intervention: Prevent Infection  Flowsheets (Taken 9/7/2023 1736)  Infection Prevention:   cohorting utilized   environmental surveillance performed   equipment surfaces disinfected   hand hygiene promoted   personal protective equipment utilized   rest/sleep promoted   single patient room provided  Goal: Optimal Comfort and Wellbeing  Outcome: Ongoing, Progressing  Intervention: Monitor Pain and Promote Comfort  Flowsheets (Taken 9/7/2023 1736)  Pain Management Interventions:   care clustered   diversional activity provided   medication offered but refused   pain management plan reviewed with patient/caregiver   pillow support provided   position adjusted   quiet environment  facilitated  Intervention: Provide Person-Centered Care  Flowsheets (Taken 9/7/2023 1736)  Trust Relationship/Rapport:   care explained   choices provided   emotional support provided   empathic listening provided   questions answered   questions encouraged   reassurance provided   thoughts/feelings acknowledged  Goal: Readiness for Transition of Care  Outcome: Ongoing, Progressing     Problem: Diabetes Comorbidity  Goal: Blood Glucose Level Within Targeted Range  Outcome: Ongoing, Progressing  Intervention: Monitor and Manage Glycemia  Flowsheets (Taken 9/7/2023 1736)  Glycemic Management: blood glucose monitored     Problem: Adjustment to Illness (Sepsis/Septic Shock)  Goal: Optimal Coping  Outcome: Ongoing, Progressing  Intervention: Optimize Psychosocial Adjustment to Illness  Flowsheets (Taken 9/7/2023 1736)  Supportive Measures: active listening utilized  Family/Support System Care: support provided     Problem: Bleeding (Sepsis/Septic Shock)  Goal: Absence of Bleeding  Outcome: Ongoing, Progressing  Intervention: Monitor and Manage Bleeding  Flowsheets (Taken 9/7/2023 1736)  Bleeding Precautions:   monitored for signs of bleeding   blood pressure closely monitored  Bleeding Management:   dressing monitored   packing maintained     Problem: Glycemic Control Impaired (Sepsis/Septic Shock)  Goal: Blood Glucose Level Within Desired Range  Outcome: Ongoing, Progressing  Intervention: Optimize Glycemic Control  Flowsheets (Taken 9/7/2023 1736)  Glycemic Management: blood glucose monitored     Problem: Infection Progression (Sepsis/Septic Shock)  Goal: Absence of Infection Signs and Symptoms  Outcome: Ongoing, Progressing  Intervention: Initiate Sepsis Management  Flowsheets (Taken 9/7/2023 1736)  Infection Prevention:   cohorting utilized   environmental surveillance performed   equipment surfaces disinfected   hand hygiene promoted   personal protective equipment utilized   rest/sleep promoted   single patient  room provided     Problem: Nutrition Impaired (Sepsis/Septic Shock)  Goal: Optimal Nutrition Intake  Outcome: Ongoing, Progressing     Problem: Infection  Goal: Absence of Infection Signs and Symptoms  Outcome: Ongoing, Progressing     Problem: Impaired Wound Healing  Goal: Optimal Wound Healing  Outcome: Ongoing, Progressing     Problem: Skin Injury Risk Increased  Goal: Skin Health and Integrity  Outcome: Ongoing, Progressing     Problem: Fall Injury Risk  Goal: Absence of Fall and Fall-Related Injury  Outcome: Ongoing, Progressing

## 2023-09-07 NOTE — PT/OT/SLP PROGRESS
Physical Therapy Treatment Note           Name: Antonio Galvan II    : 1965 (57 y.o.)  MRN: [id number]           TREATMENT SUMMARY AND RECOMMENDATIONS:    PT Received On: 23  PT Start Time: 1255     PT Stop Time: 1320  PT Total Time (min): 25 min     Subjective Assessment:   No complaints  Lethargic   x Awake, alert, cooperative  Uncooperative    Agitated  c/o pain    Appropriate  c/o fatigue    Confused x Treated at bedside     Emotionally labile  Treated in gym/dept.    Impulsive  Other:    Flat affect       Therapy Precautions:    Cognitive deficits  Spinal precautions    Collar - hard  Sternal precautions    Collar - soft   TLSO    Fall risk  LSO    Hip precautions - posterior  Knee immobilizer    Hip precautions - anterior  WBAT    Impaired communication  Partial weightbearing    Oxygen  TTWB    PEG tube  NWB    Visual deficits x Other: perineural catheter, PICC line, pressure relief boots     Hearing deficits  x Quadriplegia        Treatment Objectives:     Mobility Training:   Assist level Comments    Bed mobility     Transfer     Gait     Sit to stand transitions     Sitting balance     Standing balance      Wheelchair mobility     Car transfer     Other:          Therapeutic Exercise:   Exercise Sets Reps Comments   PROM to B UE and LE, all joints and digits   All functional planes to tolerance with sustained hold at end ranges                         Additional Comments:  ROM tasks completed to prevent further contractures and ensure ability for positioning in WC, along with proper positioning to reduce further skin break down. Nursing in room reporting to watch catheter because it had just come out and it took a long time to get back in.     Good tolerance demonstrated.     Wound care still staying no to EOB or OOB    Assessment: Patient tolerated session well.    PT Plan: continue per POC  Revisions made to plan of care: No    GOALS:   Multidisciplinary Problems       Physical  Therapy Goals          Problem: Physical Therapy    Goal Priority Disciplines Outcome Goal Variances Interventions   Physical Therapy Goal     PT, PT/OT Ongoing, Progressing     Description: Goals to be met by: Discharge     Patient will increase functional independence with mobility by performin.  Pt to receive PROM BLEs and UEs 5-6 d/wk, qd,  to prevent further contractures and ensure ability for positioning in WC, along with proper positioning to reduce further skin break down  2.  Pt to be assessed for appropriateness for out of bed to WC - awaiting wound care                       Skilled PT Minutes Provided: 25   Communication with Treatment Team:     Equipment recommendations:       At end of treatment, patient remained:   Up in chair     Up in wheelchair in room   x In bed    With alarm activated   x Bed rails up   x Call bell in reach    x Family/friends present    Restraints secured properly    In bathroom with CNA/RN notified    Nurse aware    In gym with therapist/tech    Other:

## 2023-09-08 LAB
ANION GAP SERPL CALC-SCNC: 8 MEQ/L
BASOPHILS # BLD AUTO: 0.01 X10(3)/MCL
BASOPHILS NFR BLD AUTO: 0.2 %
BUN SERPL-MCNC: 47.7 MG/DL (ref 8.4–25.7)
CALCIUM SERPL-MCNC: 8.2 MG/DL (ref 8.4–10.2)
CHLORIDE SERPL-SCNC: 109 MMOL/L (ref 98–107)
CO2 SERPL-SCNC: 24 MMOL/L (ref 22–29)
CREAT SERPL-MCNC: 1.48 MG/DL (ref 0.73–1.18)
CREAT/UREA NIT SERPL: 32
EOSINOPHIL # BLD AUTO: 0.3 X10(3)/MCL (ref 0–0.9)
EOSINOPHIL NFR BLD AUTO: 6.2 %
ERYTHROCYTE [DISTWIDTH] IN BLOOD BY AUTOMATED COUNT: 17.8 % (ref 11.5–17)
GFR SERPLBLD CREATININE-BSD FMLA CKD-EPI: 56 MLS/MIN/1.73/M2
GLUCOSE SERPL-MCNC: 126 MG/DL (ref 74–100)
HCT VFR BLD AUTO: 31.5 % (ref 42–52)
HGB BLD-MCNC: 9.6 G/DL (ref 14–18)
IMM GRANULOCYTES # BLD AUTO: 0.02 X10(3)/MCL (ref 0–0.04)
IMM GRANULOCYTES NFR BLD AUTO: 0.4 %
LYMPHOCYTES # BLD AUTO: 1.66 X10(3)/MCL (ref 0.6–4.6)
LYMPHOCYTES NFR BLD AUTO: 34.5 %
MAGNESIUM SERPL-MCNC: 2.1 MG/DL (ref 1.6–2.6)
MCH RBC QN AUTO: 27.7 PG (ref 27–31)
MCHC RBC AUTO-ENTMCNC: 30.5 G/DL (ref 33–36)
MCV RBC AUTO: 90.8 FL (ref 80–94)
MONOCYTES # BLD AUTO: 0.42 X10(3)/MCL (ref 0.1–1.3)
MONOCYTES NFR BLD AUTO: 8.7 %
NEUTROPHILS # BLD AUTO: 2.4 X10(3)/MCL (ref 2.1–9.2)
NEUTROPHILS NFR BLD AUTO: 50 %
PLATELET # BLD AUTO: 197 X10(3)/MCL (ref 130–400)
PMV BLD AUTO: 10.4 FL (ref 7.4–10.4)
POCT GLUCOSE: 113 MG/DL (ref 70–110)
POCT GLUCOSE: 138 MG/DL (ref 70–110)
POCT GLUCOSE: 147 MG/DL (ref 70–110)
POCT GLUCOSE: 173 MG/DL (ref 70–110)
POTASSIUM SERPL-SCNC: 4.9 MMOL/L (ref 3.5–5.1)
RBC # BLD AUTO: 3.47 X10(6)/MCL (ref 4.7–6.1)
SODIUM SERPL-SCNC: 141 MMOL/L (ref 136–145)
VANCOMYCIN TROUGH SERPL-MCNC: 21 UG/ML (ref 15–20)
WBC # SPEC AUTO: 4.81 X10(3)/MCL (ref 4.5–11.5)

## 2023-09-08 PROCEDURE — 94760 N-INVAS EAR/PLS OXIMETRY 1: CPT

## 2023-09-08 PROCEDURE — 63600175 PHARM REV CODE 636 W HCPCS: Performed by: STUDENT IN AN ORGANIZED HEALTH CARE EDUCATION/TRAINING PROGRAM

## 2023-09-08 PROCEDURE — 80048 BASIC METABOLIC PNL TOTAL CA: CPT | Performed by: STUDENT IN AN ORGANIZED HEALTH CARE EDUCATION/TRAINING PROGRAM

## 2023-09-08 PROCEDURE — 11000004 HC SNF PRIVATE

## 2023-09-08 PROCEDURE — 97110 THERAPEUTIC EXERCISES: CPT

## 2023-09-08 PROCEDURE — 25000003 PHARM REV CODE 250: Performed by: STUDENT IN AN ORGANIZED HEALTH CARE EDUCATION/TRAINING PROGRAM

## 2023-09-08 PROCEDURE — 83735 ASSAY OF MAGNESIUM: CPT | Performed by: STUDENT IN AN ORGANIZED HEALTH CARE EDUCATION/TRAINING PROGRAM

## 2023-09-08 PROCEDURE — 85025 COMPLETE CBC W/AUTO DIFF WBC: CPT | Performed by: STUDENT IN AN ORGANIZED HEALTH CARE EDUCATION/TRAINING PROGRAM

## 2023-09-08 PROCEDURE — 27000207 HC ISOLATION

## 2023-09-08 PROCEDURE — 80202 ASSAY OF VANCOMYCIN: CPT | Performed by: STUDENT IN AN ORGANIZED HEALTH CARE EDUCATION/TRAINING PROGRAM

## 2023-09-08 PROCEDURE — 99900035 HC TECH TIME PER 15 MIN (STAT)

## 2023-09-08 RX ADMIN — SODIUM CHLORIDE: 9 INJECTION, SOLUTION INTRAVENOUS at 05:09

## 2023-09-08 RX ADMIN — MULTIPLE VITAMINS W/ MINERALS TAB 1 TABLET: TAB at 09:09

## 2023-09-08 RX ADMIN — CEFEPIME 2 G: 2 INJECTION, POWDER, FOR SOLUTION INTRAVENOUS at 09:09

## 2023-09-08 RX ADMIN — INSULIN DETEMIR 20 UNITS: 100 INJECTION, SOLUTION SUBCUTANEOUS at 09:09

## 2023-09-08 RX ADMIN — SODIUM CHLORIDE: 9 INJECTION, SOLUTION INTRAVENOUS at 12:09

## 2023-09-08 RX ADMIN — SITAGLIPTIN 100 MG: 50 TABLET, FILM COATED ORAL at 09:09

## 2023-09-08 RX ADMIN — HYDROCODONE BITARTRATE AND ACETAMINOPHEN 1 TABLET: 10; 325 TABLET ORAL at 01:09

## 2023-09-08 RX ADMIN — CEFEPIME 2 G: 2 INJECTION, POWDER, FOR SOLUTION INTRAVENOUS at 12:09

## 2023-09-08 RX ADMIN — ESCITALOPRAM 5 MG: 5 TABLET, FILM COATED ORAL at 09:09

## 2023-09-08 RX ADMIN — FERROUS SULFATE TAB 325 MG (65 MG ELEMENTAL FE) 1 EACH: 325 (65 FE) TAB at 09:09

## 2023-09-08 RX ADMIN — OXYCODONE HYDROCHLORIDE AND ACETAMINOPHEN 500 MG: 500 TABLET ORAL at 09:09

## 2023-09-08 RX ADMIN — INSULIN ASPART 7 UNITS: 100 INJECTION, SOLUTION INTRAVENOUS; SUBCUTANEOUS at 05:09

## 2023-09-08 RX ADMIN — CEFEPIME 2 G: 2 INJECTION, POWDER, FOR SOLUTION INTRAVENOUS at 05:09

## 2023-09-08 RX ADMIN — TRAMADOL HYDROCHLORIDE 50 MG: 50 TABLET, COATED ORAL at 12:09

## 2023-09-08 RX ADMIN — INSULIN ASPART 7 UNITS: 100 INJECTION, SOLUTION INTRAVENOUS; SUBCUTANEOUS at 12:09

## 2023-09-08 RX ADMIN — SENNOSIDES AND DOCUSATE SODIUM 2 TABLET: 8.6; 5 TABLET ORAL at 09:09

## 2023-09-08 RX ADMIN — CARVEDILOL 12.5 MG: 12.5 TABLET, FILM COATED ORAL at 05:09

## 2023-09-08 RX ADMIN — CARVEDILOL 12.5 MG: 12.5 TABLET, FILM COATED ORAL at 07:09

## 2023-09-08 RX ADMIN — INSULIN ASPART 1 UNITS: 100 INJECTION, SOLUTION INTRAVENOUS; SUBCUTANEOUS at 09:09

## 2023-09-08 RX ADMIN — INSULIN ASPART 7 UNITS: 100 INJECTION, SOLUTION INTRAVENOUS; SUBCUTANEOUS at 07:09

## 2023-09-08 RX ADMIN — SODIUM CHLORIDE: 9 INJECTION, SOLUTION INTRAVENOUS at 09:09

## 2023-09-08 RX ADMIN — QUETIAPINE FUMARATE 50 MG: 50 TABLET ORAL at 09:09

## 2023-09-08 NOTE — PLAN OF CARE
Problem: Adult Inpatient Plan of Care  Goal: Plan of Care Review  Outcome: Ongoing, Progressing  Goal: Patient-Specific Goal (Individualized)  Outcome: Ongoing, Progressing  Flowsheets (Taken 9/8/2023 0800)  Anxieties, Fears or Concerns: None verbalized  Individualized Care Needs: Monitor dressings for bleeding, Turn q 2 hours, IV ABX, Monitor CBG     Problem: Bleeding (Sepsis/Septic Shock)  Goal: Absence of Bleeding  Outcome: Ongoing, Progressing     Problem: Glycemic Control Impaired (Sepsis/Septic Shock)  Goal: Blood Glucose Level Within Desired Range  Outcome: Ongoing, Progressing     Problem: Impaired Wound Healing  Goal: Optimal Wound Healing  Outcome: Ongoing, Progressing

## 2023-09-08 NOTE — PT/OT/SLP PROGRESS
Physical Therapy Treatment Note           Name: Antonio Galvan II    : 1965 (57 y.o.)  MRN: [id number]           TREATMENT SUMMARY AND RECOMMENDATIONS:    PT Received On: 23  PT Start Time: 1035     PT Stop Time: 1105  PT Total Time (min): 30 min     Subjective Assessment:   No complaints  Lethargic   x Awake, alert, cooperative  Uncooperative    Agitated  c/o pain    Appropriate  c/o fatigue    Confused x Treated at bedside     Emotionally labile  Treated in gym/dept.    Impulsive  Other:    Flat affect       Therapy Precautions:    Cognitive deficits  Spinal precautions    Collar - hard  Sternal precautions    Collar - soft   TLSO    Fall risk  LSO    Hip precautions - posterior  Knee immobilizer    Hip precautions - anterior  WBAT    Impaired communication  Partial weightbearing    Oxygen  TTWB    PEG tube  NWB    Visual deficits x Other: perineural catheter, PICC line, pressure relief boots     Hearing deficits  x Quadriplegia        Treatment Objectives:     Mobility Training:   Assist level Comments    Bed mobility     Transfer     Gait     Sit to stand transitions     Sitting balance     Standing balance      Wheelchair mobility     Car transfer     Other:          Therapeutic Exercise:   Exercise Sets Reps Comments   PROM to B UE and LE, all joints and digits   All functional planes to tolerance with sustained hold at end ranges                         Additional Comments:  ROM tasks completed to prevent further contractures and ensure ability for positioning in WC, along with proper positioning to reduce further skin break down. Pt with improved ROM this morning and less mm tension demonstrated. Good tolerance     Wound care to re-assess wound site for progression of EOB or OOB to WC.     Assessment: Patient tolerated session well.    PT Plan: continue per POC  Revisions made to plan of care: No    GOALS:   Multidisciplinary Problems       Physical Therapy Goals          Problem:  Physical Therapy    Goal Priority Disciplines Outcome Goal Variances Interventions   Physical Therapy Goal     PT, PT/OT Ongoing, Progressing     Description: Goals to be met by: Discharge     Patient will increase functional independence with mobility by performin.  Pt to receive PROM BLEs and UEs 5-6 d/wk, qd,  to prevent further contractures and ensure ability for positioning in WC, along with proper positioning to reduce further skin break down  2.  Pt to be assessed for appropriateness for out of bed to WC - awaiting wound care                       Skilled PT Minutes Provided: 25   Communication with Treatment Team:     Equipment recommendations:       At end of treatment, patient remained:   Up in chair     Up in wheelchair in room   x In bed    With alarm activated   x Bed rails up   x Call bell in reach    x Family/friends present    Restraints secured properly    In bathroom with CNA/RN notified    Nurse aware    In gym with therapist/tech    Other:

## 2023-09-08 NOTE — PLAN OF CARE
Problem: Adult Inpatient Plan of Care  Goal: Plan of Care Review  Outcome: Ongoing, Progressing  Goal: Patient-Specific Goal (Individualized)  Outcome: Ongoing, Progressing  Flowsheets (Taken 9/7/2023 2000)  Anxieties, Fears or Concerns: Monitor bleeding of wounds  Individualized Care Needs: Monitor CBG, bleeding of wound, BP, Turning pt. Q2hr, and ABX therapy  Goal: Absence of Hospital-Acquired Illness or Injury  Outcome: Ongoing, Progressing  Goal: Optimal Comfort and Wellbeing  Outcome: Ongoing, Progressing  Intervention: Monitor Pain and Promote Comfort  Flowsheets (Taken 9/7/2023 2000)  Pain Management Interventions:   care clustered   quiet environment facilitated  Goal: Readiness for Transition of Care  Outcome: Ongoing, Progressing     Problem: Diabetes Comorbidity  Goal: Blood Glucose Level Within Targeted Range  Outcome: Ongoing, Progressing     Problem: Adjustment to Illness (Sepsis/Septic Shock)  Goal: Optimal Coping  Outcome: Ongoing, Progressing     Problem: Bleeding (Sepsis/Septic Shock)  Goal: Absence of Bleeding  Outcome: Ongoing, Progressing     Problem: Glycemic Control Impaired (Sepsis/Septic Shock)  Goal: Blood Glucose Level Within Desired Range  Outcome: Ongoing, Progressing     Problem: Infection Progression (Sepsis/Septic Shock)  Goal: Absence of Infection Signs and Symptoms  Outcome: Ongoing, Progressing  Intervention: Promote Stabilization  Flowsheets (Taken 9/7/2023 2000)  Fever Reduction/Comfort Measures:   lightweight bedding   lightweight clothing  Intervention: Promote Recovery  Flowsheets (Taken 9/7/2023 2000)  Sleep/Rest Enhancement:   awakenings minimized   regular sleep/rest pattern promoted   family presence promoted  Airway/Ventilation Support: comfort measures provided     Problem: Nutrition Impaired (Sepsis/Septic Shock)  Goal: Optimal Nutrition Intake  Outcome: Ongoing, Progressing     Problem: Infection  Goal: Absence of Infection Signs and Symptoms  Outcome: Ongoing,  Progressing  Intervention: Prevent or Manage Infection  Flowsheets (Taken 9/7/2023 2000)  Fever Reduction/Comfort Measures:   lightweight bedding   lightweight clothing     Problem: Impaired Wound Healing  Goal: Optimal Wound Healing  Outcome: Ongoing, Progressing  Intervention: Promote Wound Healing  Flowsheets (Taken 9/7/2023 2000)  Sleep/Rest Enhancement:   awakenings minimized   regular sleep/rest pattern promoted   family presence promoted  Pain Management Interventions:   care clustered   quiet environment facilitated     Problem: Skin Injury Risk Increased  Goal: Skin Health and Integrity  Outcome: Ongoing, Progressing     Problem: Fall Injury Risk  Goal: Absence of Fall and Fall-Related Injury  Outcome: Ongoing, Progressing  Intervention: Identify and Manage Contributors  Flowsheets (Taken 9/7/2023 2000)  Self-Care Promotion: independence encouraged

## 2023-09-08 NOTE — NURSING
Shift change reports completed with VERONICA Steiner at the patient's bedside.  Verbal confirmation received from patient and girlfriend that he will not be doing his bowel regimen today.  Tele called to inform the nurse that the patient's pulse was sustaining at 127 bmp.  Obdulia and AUSTIN were completing catheter care and repositioning the patient at the time.  /96, Pulse 96, Respiration 20, SpO2 99% on RA.  No distress noted.

## 2023-09-08 NOTE — PROGRESS NOTES
Follow up assessment performed.   No active bleeding noted to L hip or R hip  ulceration today with adaptic use to wound base.   Active bleeding noted to sacral ulcer with minimal cleansing.   Able to control bleeding after 10 min of pressure.   Reapplied dressing per orders.  Foot wounds remain stable and dry with current dressing change regimen.   Recommend changing dressings to bilateral feet daily.   All pressure prevention measures continued.     09/08/23 1205   WOCN Assessment   WOCN Total Time (mins) 120   Visit Date 09/08/23   Consult Type Follow Up   WOCN Speciality Wound   Wound pressure;surgical   Number of Wounds 6   Intervention assessed;orders   Teaching on-going        Altered Skin Integrity 05/06/22 1140 Right Heel  Full thickness tissue loss. Subcutaneous fat may be visible but bone, tendon or muscle are not exposed   Date First Assessed/Time First Assessed: 05/06/22 1140   Altered Skin Integrity Present on Admission: yes  Side: Right  Location: Heel  Is this injury device related?: No  Primary Wound Type: (c)   Description of Altered Skin Integrity: Full thickness...   Wound Image    Description of Altered Skin Integrity Full thickness tissue loss. Subcutaneous fat may be visible but bone, tendon or muscle are not exposed   Drainage Amount Scant   Drainage Characteristics/Odor Sanguineous;No odor;Bleeding controlled   Appearance Red;Granulating   Tissue loss description Full thickness   Red (%), Wound Tissue Color 100 %   Periwound Area Scar tissue;Excoriated   Wound Edges Irregular   Wound Length (cm) 2 cm   Wound Width (cm) 2 cm   Wound Depth (cm) 0.1 cm   Wound Volume (cm^3) 0.4 cm^3   Wound Surface Area (cm^2) 4 cm^2   Care Cleansed with:;Wound cleanser;Applied:;Povidone iodine   Dressing Applied;Gauze;Absorptive Pad;Rolled gauze   Off Loading Other (see comments)  (heel lift boot)   Dressing Change Due 09/10/23        Altered Skin Integrity 01/12/23 1530 Sacral spine Full thickness tissue loss  with exposed bone, tendon, or muscle. Often includes undermining and tunneling. May extend into muscle and/or supporting structures.   Date First Assessed/Time First Assessed: 01/12/23 1530   Altered Skin Integrity Present on Admission - Did Patient arrive to the hospital with altered skin?: yes  Location: Sacral spine  Description of Altered Skin Integrity: Full thickness tissue los...   Wound Image    Description of Altered Skin Integrity Full thickness tissue loss. Subcutaneous fat may be visible but bone, tendon or muscle are not exposed   Dressing Appearance Intact;Moist drainage   Drainage Amount Moderate   Drainage Characteristics/Odor Sanguineous;No odor;Bleeding controlled  (with 10 min pressure)   Appearance Red;Granulating   Tissue loss description Full thickness   Red (%), Wound Tissue Color 100 %   Periwound Area Scar tissue;Ecchymotic   Wound Edges Irregular   Wound Length (cm) 4 cm   Wound Width (cm) 1.5 cm   Wound Depth (cm) 0.3 cm   Wound Volume (cm^3) 1.8 cm^3   Wound Surface Area (cm^2) 6 cm^2   Care Cleansed with:;Wound cleanser   Dressing Applied;Silver;Hydrofiber;Gauze;Absorptive Pad;Other (comment)  (medfix tape)   Dressing Change Due 09/09/23        Altered Skin Integrity 08/01/23 1535 Left anterior Ankle Other (comment) Full thickness tissue loss. Base is covered by slough and/or eschar in the wound bed   Date First Assessed/Time First Assessed: 08/01/23 1535   Altered Skin Integrity Present on Admission - Did Patient arrive to the hospital with altered skin?: yes  Side: Left  Orientation: anterior  Location: Ankle  Is this injury device related?: No  ...   Wound Image    Description of Altered Skin Integrity Full thickness tissue loss. Base is covered by slough and/or eschar in the wound bed   Dressing Appearance Intact;Dry;Clean   Drainage Amount None   Appearance Dry;Black;Eschar   Black (%), Wound Tissue Color 100 %   Periwound Area Intact;Dry   Wound Edges Defined   Wound Length (cm)  3.5 cm   Wound Width (cm) 3 cm   Wound Surface Area (cm^2) 10.5 cm^2   Care Cleansed with:;Wound cleanser;Applied:;Povidone iodine   Dressing Applied;Gauze;Absorptive Pad;Rolled gauze   Off Loading Other (see comments)  (heel lift boot)   Dressing Change Due 09/10/23        Altered Skin Integrity 08/01/23 1534 Left posterior Ankle Full thickness tissue loss. Base is covered by slough and/or eschar in the wound bed   Date First Assessed/Time First Assessed: 08/01/23 1534   Altered Skin Integrity Present on Admission - Did Patient arrive to the hospital with altered skin?: yes  Side: Left  Orientation: posterior  Location: Ankle  Description of Altered Skin Integri...   Wound Image    Description of Altered Skin Integrity Full thickness tissue loss. Base is covered by slough and/or eschar in the wound bed   Dressing Appearance Intact;Dried drainage   Drainage Amount Scant   Drainage Characteristics/Odor Sanguineous;No odor;Bleeding controlled   Appearance Black;Dry;Eschar;Pink;Not granulating   Tissue loss description Full thickness   Black (%), Wound Tissue Color 99 %   Red (%), Wound Tissue Color 1 %   Periwound Area Intact;Dry   Wound Edges Defined   Wound Length (cm) 4.5 cm   Wound Width (cm) 3 cm   Wound Depth (cm) 0.1 cm   Wound Volume (cm^3) 1.35 cm^3   Wound Surface Area (cm^2) 13.5 cm^2   Care Cleansed with:;Wound cleanser;Applied:;Povidone iodine   Dressing Applied;Gauze;Absorptive Pad;Rolled gauze   Off Loading Other (see comments)  (heel lift boot)   Dressing Change Due 09/10/23        Incision/Site 08/16/23 2011 Right Hip posterior   Date First Assessed/Time First Assessed: 08/16/23 2011   Side: Right  Location: Hip  Orientation: posterior   Wound Image    Dressing Appearance Intact;Moist drainage   Drainage Amount Large   Drainage Characteristics/Odor Serosanguineous;No odor;Bleeding controlled   Appearance Red;Granulating;Tan;Slough;Fibrin;Muscle   Red (%), Wound Tissue Color 90 %   Periwound Area  Dry;Scar tissue   Wound Edges Defined   Wound Length (cm) 11 cm   Wound Width (cm) 15 cm   Wound Depth (cm) 3.8 cm   Wound Volume (cm^3) 627 cm^3   Wound Surface Area (cm^2) 165 cm^2   Care Cleansed with:;Wound cleanser   Dressing Applied;Non-adherent;Hydrofiber;Silver;Gauze;Absorptive Pad;Other (comment)  (adaptic to wound base, medfix tape)   Packing hydrofiber/alginate   Periwound Care Dry periwound area maintained   Dressing Change Due 09/09/23        Incision/Site 08/16/23 2011 Left Hip lateral   Date First Assessed/Time First Assessed: 08/16/23 2011   Side: Left  Location: Hip  Orientation: lateral   Wound Image       Dressing Appearance Intact;Moist drainage   Drainage Amount Moderate   Drainage Characteristics/Odor Serosanguineous   Appearance Red;Granulating;Pink;Not granulating;Tan;Fibrin;Ecchymotic;Muscle;Smooth  (wound base smooth, ecchymotic changes to inside walls of wound bed noted)   Red (%), Wound Tissue Color 80 %   Periwound Area Intact   Wound Edges Defined   Wound Length (cm) 10.1 cm   Wound Width (cm) 5.2 cm   Wound Depth (cm) 3.5 cm   Wound Volume (cm^3) 183.82 cm^3   Wound Surface Area (cm^2) 52.52 cm^2   Care Cleansed with:;Wound cleanser   Dressing Applied;Non-adherent;Gauze, wet to moist;Absorptive Pad;Other (comment)  (adaptic to wound base and inside edges touching open area, vashe moistened wet to moist, medfix tape)   Packing packed with;other (see comment)  (vashe moistened wet to moist)   Periwound Care Dry periwound area maintained   Dressing Change Due 09/09/23
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
## 2023-09-09 LAB — POCT GLUCOSE: 102 MG/DL (ref 70–110)

## 2023-09-09 PROCEDURE — 94760 N-INVAS EAR/PLS OXIMETRY 1: CPT

## 2023-09-09 PROCEDURE — A4216 STERILE WATER/SALINE, 10 ML: HCPCS | Performed by: STUDENT IN AN ORGANIZED HEALTH CARE EDUCATION/TRAINING PROGRAM

## 2023-09-09 PROCEDURE — 27000207 HC ISOLATION

## 2023-09-09 PROCEDURE — 63600175 PHARM REV CODE 636 W HCPCS: Performed by: STUDENT IN AN ORGANIZED HEALTH CARE EDUCATION/TRAINING PROGRAM

## 2023-09-09 PROCEDURE — 99900035 HC TECH TIME PER 15 MIN (STAT)

## 2023-09-09 PROCEDURE — 25000003 PHARM REV CODE 250: Performed by: STUDENT IN AN ORGANIZED HEALTH CARE EDUCATION/TRAINING PROGRAM

## 2023-09-09 PROCEDURE — 11000004 HC SNF PRIVATE

## 2023-09-09 RX ADMIN — SITAGLIPTIN 100 MG: 50 TABLET, FILM COATED ORAL at 09:09

## 2023-09-09 RX ADMIN — CEFEPIME 2 G: 2 INJECTION, POWDER, FOR SOLUTION INTRAVENOUS at 09:09

## 2023-09-09 RX ADMIN — INSULIN ASPART 7 UNITS: 100 INJECTION, SOLUTION INTRAVENOUS; SUBCUTANEOUS at 04:09

## 2023-09-09 RX ADMIN — Medication 10 ML: at 06:09

## 2023-09-09 RX ADMIN — CEFEPIME 2 G: 2 INJECTION, POWDER, FOR SOLUTION INTRAVENOUS at 06:09

## 2023-09-09 RX ADMIN — SODIUM CHLORIDE: 9 INJECTION, SOLUTION INTRAVENOUS at 09:09

## 2023-09-09 RX ADMIN — FERROUS SULFATE TAB 325 MG (65 MG ELEMENTAL FE) 1 EACH: 325 (65 FE) TAB at 08:09

## 2023-09-09 RX ADMIN — INSULIN ASPART 7 UNITS: 100 INJECTION, SOLUTION INTRAVENOUS; SUBCUTANEOUS at 11:09

## 2023-09-09 RX ADMIN — ESCITALOPRAM 5 MG: 5 TABLET, FILM COATED ORAL at 09:09

## 2023-09-09 RX ADMIN — CEFEPIME 2 G: 2 INJECTION, POWDER, FOR SOLUTION INTRAVENOUS at 01:09

## 2023-09-09 RX ADMIN — CARVEDILOL 12.5 MG: 12.5 TABLET, FILM COATED ORAL at 08:09

## 2023-09-09 RX ADMIN — SODIUM CHLORIDE: 9 INJECTION, SOLUTION INTRAVENOUS at 01:09

## 2023-09-09 RX ADMIN — OXYCODONE HYDROCHLORIDE AND ACETAMINOPHEN 500 MG: 500 TABLET ORAL at 08:09

## 2023-09-09 RX ADMIN — SODIUM CHLORIDE: 9 INJECTION, SOLUTION INTRAVENOUS at 06:09

## 2023-09-09 RX ADMIN — INSULIN ASPART 7 UNITS: 100 INJECTION, SOLUTION INTRAVENOUS; SUBCUTANEOUS at 08:09

## 2023-09-09 RX ADMIN — INSULIN DETEMIR 20 UNITS: 100 INJECTION, SOLUTION SUBCUTANEOUS at 09:09

## 2023-09-09 RX ADMIN — VANCOMYCIN HYDROCHLORIDE 500 MG: 500 INJECTION, POWDER, LYOPHILIZED, FOR SOLUTION INTRAVENOUS at 09:09

## 2023-09-09 RX ADMIN — CARVEDILOL 12.5 MG: 12.5 TABLET, FILM COATED ORAL at 04:09

## 2023-09-09 RX ADMIN — MULTIPLE VITAMINS W/ MINERALS TAB 1 TABLET: TAB at 08:09

## 2023-09-09 RX ADMIN — TRAMADOL HYDROCHLORIDE 50 MG: 50 TABLET, COATED ORAL at 03:09

## 2023-09-09 NOTE — PROGRESS NOTES
Pharmacokinetic Assessment Follow Up: IV Vancomycin    Vancomycin serum concentration assessment(s):    The trough level was drawn correctly and can be used to guide therapy at this time. The measurement is above the desired definitive target range of 15 to 20 mcg/mL.    Vancomycin Regimen Plan:    Change to 500 mg q36h and check trough at 0700 on 9/12/23.    Drug levels (last 3 results):  Recent Labs   Lab Result Units 09/08/23  1801   Vancomycin Trough ug/ml 21.0*       Pharmacy will continue to follow and monitor vancomycin.       Patient brief summary:  Antonio Galvan II is a 57 y.o. male initiated on antimicrobial therapy with IV Vancomycin for treatment of skin & soft tissue infection      Drug Allergies:   Review of patient's allergies indicates:  No Known Allergies    Actual Body Weight:   106.6 kg    Renal Function:   Estimated Creatinine Clearance: 66.8 mL/min (A) (based on SCr of 1.48 mg/dL (H)).,     Dialysis Method (if applicable):  N/A    CBC (last 72 hours):  Recent Labs   Lab Result Units 09/06/23 0357 09/07/23 0419 09/08/23  0410   WBC x10(3)/mcL 6.41 5.90 4.81   Hgb g/dL 7.9* 9.1* 9.6*   Hct % 26.2* 29.7* 31.5*   Platelet x10(3)/mcL 246 191 197   Mono % % 6.2 9.0 8.7   Eos % % 5.8 5.1 6.2   Basophil % % 0.5 0.7 0.2       Metabolic Panel (last 72 hours):  Recent Labs   Lab Result Units 09/06/23 0357 09/07/23 0419 09/08/23  0410   Sodium Level mmol/L 142 138 141   Potassium Level mmol/L 4.9 5.3* 4.9   Chloride mmol/L 111* 108* 109*   Carbon Dioxide mmol/L 22 23 24   Glucose Level mg/dL 176* 135* 126*   Blood Urea Nitrogen mg/dL 35.5* 47.9* 47.7*   Creatinine mg/dL 1.20* 1.55* 1.48*   Magnesium Level mg/dL 2.00  --  2.10       Vancomycin Administrations:  vancomycin given in the last 96 hours                     vancomycin (VANCOCIN) 500 mg in dextrose 5 % in water (D5W) 100 mL IVPB (MB+) (mg) 500 mg New Bag 09/07/23 1935     500 mg New Bag 09/06/23 1948     500 mg New Bag 09/05/23 1937 500  mg New Bag 09/04/23 1906                    Microbiologic Results:  Microbiology Results (last 7 days)       ** No results found for the last 168 hours. **

## 2023-09-09 NOTE — PLAN OF CARE
Problem: Adult Inpatient Plan of Care  Goal: Plan of Care Review  Outcome: Ongoing, Progressing  Goal: Patient-Specific Goal (Individualized)  Outcome: Ongoing, Progressing  Flowsheets (Taken 9/8/2023 2030)  Anxieties, Fears or Concerns: Pt trying to increase his water intake each day as instructed by Dr for improved Kidney function.  Individualized Care Needs: Monitor drsgs for bleeding, continue IV abx, monitor CBG's and maintain contact isolation  Goal: Absence of Hospital-Acquired Illness or Injury  Outcome: Ongoing, Progressing  Goal: Optimal Comfort and Wellbeing  Outcome: Ongoing, Progressing  Goal: Readiness for Transition of Care  Outcome: Ongoing, Progressing     Problem: Adjustment to Illness (Sepsis/Septic Shock)  Goal: Optimal Coping  Outcome: Ongoing, Progressing  Intervention: Optimize Psychosocial Adjustment to Illness  Flowsheets (Taken 9/8/2023 2040)  Supportive Measures:   active listening utilized   self-responsibility promoted     Problem: Bleeding (Sepsis/Septic Shock)  Goal: Absence of Bleeding  Outcome: Ongoing, Progressing  Intervention: Monitor and Manage Bleeding  Flowsheets (Taken 9/8/2023 2035)  Bleeding Precautions:   blood pressure closely monitored   coagulation study results reviewed   gentle oral care promoted   monitored for signs of bleeding  Bleeding Management: dressing monitored     Problem: Glycemic Control Impaired (Sepsis/Septic Shock)  Goal: Blood Glucose Level Within Desired Range  Outcome: Ongoing, Progressing  Intervention: Optimize Glycemic Control  Flowsheets (Taken 9/8/2023 2035)  Glycemic Management:   blood glucose monitored   carbohydrate replacement provided   oral hydration promoted   supplemental insulin given     Problem: Skin Injury Risk Increased  Goal: Skin Health and Integrity  Outcome: Ongoing, Progressing  Intervention: Optimize Skin Protection  Flowsheets (Taken 9/8/2023 2035)  Pressure Reduction Techniques:   frequent weight shift encouraged   weight  shift assistance provided   positioned off wounds  Pressure Reduction Devices:   positioning supports utilized   specialty bed utilized  Skin Protection:   adhesive use limited   incontinence pads utilized   skin sealant/moisture barrier applied   tubing/devices free from skin contact  Head of Bed (HOB) Positioning: HOB elevated

## 2023-09-09 NOTE — PLAN OF CARE
Problem: Adult Inpatient Plan of Care  Goal: Plan of Care Review  Outcome: Ongoing, Progressing  Goal: Patient-Specific Goal (Individualized)  Outcome: Ongoing, Progressing  Flowsheets (Taken 9/9/2023 0800)  Anxieties, Fears or Concerns: None expressed  Individualized Care Needs: Maintain bleeding precautions, IV abx, monitor glucose     Problem: Diabetes Comorbidity  Goal: Blood Glucose Level Within Targeted Range  Outcome: Ongoing, Progressing     Problem: Impaired Wound Healing  Goal: Optimal Wound Healing  Outcome: Ongoing, Progressing
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
## 2023-09-10 LAB
ANION GAP SERPL CALC-SCNC: 8 MEQ/L
BASOPHILS # BLD AUTO: 0.03 X10(3)/MCL
BASOPHILS NFR BLD AUTO: 0.7 %
BUN SERPL-MCNC: 44.1 MG/DL (ref 8.4–25.7)
CALCIUM SERPL-MCNC: 8.5 MG/DL (ref 8.4–10.2)
CHLORIDE SERPL-SCNC: 109 MMOL/L (ref 98–107)
CO2 SERPL-SCNC: 22 MMOL/L (ref 22–29)
CREAT SERPL-MCNC: 1.46 MG/DL (ref 0.73–1.18)
CREAT/UREA NIT SERPL: 30
EOSINOPHIL # BLD AUTO: 0.31 X10(3)/MCL (ref 0–0.9)
EOSINOPHIL NFR BLD AUTO: 7.3 %
ERYTHROCYTE [DISTWIDTH] IN BLOOD BY AUTOMATED COUNT: 18.1 % (ref 11.5–17)
GFR SERPLBLD CREATININE-BSD FMLA CKD-EPI: 56 MLS/MIN/1.73/M2
GLUCOSE SERPL-MCNC: 104 MG/DL (ref 74–100)
HCT VFR BLD AUTO: 33.7 % (ref 42–52)
HGB BLD-MCNC: 10 G/DL (ref 14–18)
IMM GRANULOCYTES # BLD AUTO: 0.03 X10(3)/MCL (ref 0–0.04)
IMM GRANULOCYTES NFR BLD AUTO: 0.7 %
LYMPHOCYTES # BLD AUTO: 1.59 X10(3)/MCL (ref 0.6–4.6)
LYMPHOCYTES NFR BLD AUTO: 37.5 %
MCH RBC QN AUTO: 27 PG (ref 27–31)
MCHC RBC AUTO-ENTMCNC: 29.7 G/DL (ref 33–36)
MCV RBC AUTO: 90.8 FL (ref 80–94)
MONOCYTES # BLD AUTO: 0.36 X10(3)/MCL (ref 0.1–1.3)
MONOCYTES NFR BLD AUTO: 8.5 %
NEUTROPHILS # BLD AUTO: 1.92 X10(3)/MCL (ref 2.1–9.2)
NEUTROPHILS NFR BLD AUTO: 45.3 %
PLATELET # BLD AUTO: 220 X10(3)/MCL (ref 130–400)
PMV BLD AUTO: 10.2 FL (ref 7.4–10.4)
POCT GLUCOSE: 120 MG/DL (ref 70–110)
POCT GLUCOSE: 147 MG/DL (ref 70–110)
POCT GLUCOSE: 99 MG/DL (ref 70–110)
POTASSIUM SERPL-SCNC: 5.1 MMOL/L (ref 3.5–5.1)
RBC # BLD AUTO: 3.71 X10(6)/MCL (ref 4.7–6.1)
SODIUM SERPL-SCNC: 139 MMOL/L (ref 136–145)
WBC # SPEC AUTO: 4.24 X10(3)/MCL (ref 4.5–11.5)

## 2023-09-10 PROCEDURE — 63600175 PHARM REV CODE 636 W HCPCS: Performed by: STUDENT IN AN ORGANIZED HEALTH CARE EDUCATION/TRAINING PROGRAM

## 2023-09-10 PROCEDURE — 94760 N-INVAS EAR/PLS OXIMETRY 1: CPT

## 2023-09-10 PROCEDURE — 80048 BASIC METABOLIC PNL TOTAL CA: CPT | Performed by: STUDENT IN AN ORGANIZED HEALTH CARE EDUCATION/TRAINING PROGRAM

## 2023-09-10 PROCEDURE — 85025 COMPLETE CBC W/AUTO DIFF WBC: CPT | Performed by: STUDENT IN AN ORGANIZED HEALTH CARE EDUCATION/TRAINING PROGRAM

## 2023-09-10 PROCEDURE — 11000004 HC SNF PRIVATE

## 2023-09-10 PROCEDURE — 25000003 PHARM REV CODE 250: Performed by: STUDENT IN AN ORGANIZED HEALTH CARE EDUCATION/TRAINING PROGRAM

## 2023-09-10 PROCEDURE — 27000207 HC ISOLATION

## 2023-09-10 PROCEDURE — A4216 STERILE WATER/SALINE, 10 ML: HCPCS | Performed by: STUDENT IN AN ORGANIZED HEALTH CARE EDUCATION/TRAINING PROGRAM

## 2023-09-10 RX ADMIN — OXYCODONE HYDROCHLORIDE AND ACETAMINOPHEN 500 MG: 500 TABLET ORAL at 09:09

## 2023-09-10 RX ADMIN — SODIUM CHLORIDE: 9 INJECTION, SOLUTION INTRAVENOUS at 09:09

## 2023-09-10 RX ADMIN — CARVEDILOL 12.5 MG: 12.5 TABLET, FILM COATED ORAL at 09:09

## 2023-09-10 RX ADMIN — SODIUM CHLORIDE: 9 INJECTION, SOLUTION INTRAVENOUS at 07:09

## 2023-09-10 RX ADMIN — INSULIN DETEMIR 20 UNITS: 100 INJECTION, SOLUTION SUBCUTANEOUS at 09:09

## 2023-09-10 RX ADMIN — CEFEPIME 2 G: 2 INJECTION, POWDER, FOR SOLUTION INTRAVENOUS at 09:09

## 2023-09-10 RX ADMIN — CEFEPIME 2 G: 2 INJECTION, POWDER, FOR SOLUTION INTRAVENOUS at 05:09

## 2023-09-10 RX ADMIN — INSULIN ASPART 7 UNITS: 100 INJECTION, SOLUTION INTRAVENOUS; SUBCUTANEOUS at 01:09

## 2023-09-10 RX ADMIN — CARVEDILOL 12.5 MG: 12.5 TABLET, FILM COATED ORAL at 05:09

## 2023-09-10 RX ADMIN — VANCOMYCIN HYDROCHLORIDE 500 MG: 500 INJECTION, POWDER, LYOPHILIZED, FOR SOLUTION INTRAVENOUS at 07:09

## 2023-09-10 RX ADMIN — INSULIN ASPART 7 UNITS: 100 INJECTION, SOLUTION INTRAVENOUS; SUBCUTANEOUS at 09:09

## 2023-09-10 RX ADMIN — MULTIPLE VITAMINS W/ MINERALS TAB 1 TABLET: TAB at 09:09

## 2023-09-10 RX ADMIN — SODIUM CHLORIDE: 9 INJECTION, SOLUTION INTRAVENOUS at 01:09

## 2023-09-10 RX ADMIN — INSULIN ASPART 7 UNITS: 100 INJECTION, SOLUTION INTRAVENOUS; SUBCUTANEOUS at 05:09

## 2023-09-10 RX ADMIN — FERROUS SULFATE TAB 325 MG (65 MG ELEMENTAL FE) 1 EACH: 325 (65 FE) TAB at 09:09

## 2023-09-10 RX ADMIN — SODIUM CHLORIDE: 9 INJECTION, SOLUTION INTRAVENOUS at 05:09

## 2023-09-10 RX ADMIN — SITAGLIPTIN 100 MG: 50 TABLET, FILM COATED ORAL at 09:09

## 2023-09-10 RX ADMIN — Medication 10 ML: at 01:09

## 2023-09-10 RX ADMIN — CEFEPIME 2 G: 2 INJECTION, POWDER, FOR SOLUTION INTRAVENOUS at 01:09

## 2023-09-10 NOTE — PLAN OF CARE
"  Problem: Adult Inpatient Plan of Care  Goal: Plan of Care Review  Outcome: Ongoing, Progressing  Goal: Patient-Specific Goal (Individualized)  Outcome: Ongoing, Progressing  Flowsheets (Taken 9/9/2023 2020)  Anxieties, Fears or Concerns: Pt asking about going outside because he "likes sittting in the sun."  Individualized Care Needs: Continue to monitor for bleeding, moniton CBG's, continue IV abx as ordered and drsg changes.  Goal: Absence of Hospital-Acquired Illness or Injury  Outcome: Ongoing, Progressing  Goal: Optimal Comfort and Wellbeing  Outcome: Ongoing, Progressing  Goal: Readiness for Transition of Care  Outcome: Ongoing, Progressing     Problem: Diabetes Comorbidity  Goal: Blood Glucose Level Within Targeted Range  Outcome: Ongoing, Progressing  Intervention: Monitor and Manage Glycemia  Flowsheets (Taken 9/9/2023 2020)  Glycemic Management:   blood glucose monitored   oral hydration promoted   supplemental insulin given     Problem: Glycemic Control Impaired (Sepsis/Septic Shock)  Goal: Blood Glucose Level Within Desired Range  Outcome: Ongoing, Progressing  Intervention: Optimize Glycemic Control  Flowsheets (Taken 9/9/2023 2020)  Glycemic Management:   blood glucose monitored   oral hydration promoted   supplemental insulin given     Problem: Infection Progression (Sepsis/Septic Shock)  Goal: Absence of Infection Signs and Symptoms  Outcome: Ongoing, Progressing  Intervention: Promote Recovery  Flowsheets (Taken 9/10/2023 0003)  Sleep/Rest Enhancement: (Pt normally sleeps from midnight to 10am at home.)   awakenings minimized   regular sleep/rest pattern promoted     Problem: Infection  Goal: Absence of Infection Signs and Symptoms  Outcome: Ongoing, Progressing  Intervention: Prevent or Manage Infection  Flowsheets (Taken 9/9/2023 2020)  Fever Reduction/Comfort Measures:   fluid intake increased   lightweight clothing  Isolation Precautions:   contact   precautions maintained     Problem: Skin " Injury Risk Increased  Goal: Skin Health and Integrity  Outcome: Ongoing, Progressing  Intervention: Promote and Optimize Oral Intake  Flowsheets (Taken 9/9/2023 2020)  Oral Nutrition Promotion:   calorie-dense foods provided   rest periods promoted
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
## 2023-09-10 NOTE — PLAN OF CARE
[facility]  Discharge Reassessment    Primary Care Provider: Jennifer Fernando NP    Expected Discharge Date:     Reassessment (most recent)       Discharge Reassessment - 09/10/23 1423          Discharge Reassessment    Assessment Type Discharge Planning Reassessment     Did the patient's condition or plan change since previous assessment? No     Discharge Plan discussed with: Parent(s)     Name(s) and Number(s) Judy mother      Communicated SADE with patient/caregiver Date not available/Unable to determine     Discharge Plan A Home with family;Home Health     DME Needed Upon Discharge  none     Transition of Care Barriers None     Why the patient remains in the hospital Requires continued medical care        Post-Acute Status    Post-Acute Authorization Home Health     Home Health Status Pending medical clearance/testing     Hospital Resources/Appts/Education Provided Provided patient/caregiver with written discharge plan information     Discharge Delays None known at this time

## 2023-09-10 NOTE — PLAN OF CARE
Problem: Adult Inpatient Plan of Care  Goal: Plan of Care Review  9/10/2023 1012 by Valentine Reveles RN  Outcome: Ongoing, Progressing  9/10/2023 1011 by Valentine Reveles RN  Outcome: Ongoing, Progressing  Goal: Patient-Specific Goal (Individualized)  9/10/2023 1012 by Valentine Reveles RN  Outcome: Ongoing, Progressing  Flowsheets (Taken 9/10/2023 0800)  Anxieties, Fears or Concerns: Voiced concerns that he thinks he may have had a stroke  Individualized Care Needs: Monitor for s/s of stroke, monitor BP, monitor glucose, IV antibiotics, wound care, inform MD of any changes  9/10/2023 1011 by Valentine Reveles RN  Outcome: Ongoing, Progressing     Problem: Diabetes Comorbidity  Goal: Blood Glucose Level Within Targeted Range  9/10/2023 1012 by Valentine Reveles RN  Outcome: Ongoing, Progressing  9/10/2023 1011 by Valentine Reveles RN  Outcome: Ongoing, Progressing  Intervention: Monitor and Manage Glycemia  Flowsheets (Taken 9/10/2023 0800)  Glycemic Management:   blood glucose monitored   oral hydration promoted     Problem: Infection  Goal: Absence of Infection Signs and Symptoms  9/10/2023 1012 by Valentine Reveles RN  Outcome: Ongoing, Progressing  9/10/2023 1011 by Valentine Reveles RN  Outcome: Ongoing, Progressing     Problem: Impaired Wound Healing  Goal: Optimal Wound Healing  9/10/2023 1012 by Valentine Reveles RN  Outcome: Ongoing, Progressing  9/10/2023 1011 by Valentine Reveles RN  Outcome: Ongoing, Progressing  Intervention: Promote Wound Healing  Flowsheets (Taken 9/10/2023 0800)  Oral Nutrition Promotion:   calorie-dense foods provided   safe use of adaptive equipment encouraged     Problem: Skin Injury Risk Increased  Goal: Skin Health and Integrity  Outcome: Ongoing, Progressing  Intervention: Promote and Optimize Oral Intake  Flowsheets (Taken 9/10/2023 0800)  Oral Nutrition Promotion:   calorie-dense foods provided   safe use of adaptive equipment encouraged

## 2023-09-11 LAB
POCT GLUCOSE: 161 MG/DL (ref 70–110)
POCT GLUCOSE: 186 MG/DL (ref 70–110)
POCT GLUCOSE: 80 MG/DL (ref 70–110)

## 2023-09-11 PROCEDURE — 63600175 PHARM REV CODE 636 W HCPCS: Performed by: STUDENT IN AN ORGANIZED HEALTH CARE EDUCATION/TRAINING PROGRAM

## 2023-09-11 PROCEDURE — 27000207 HC ISOLATION

## 2023-09-11 PROCEDURE — 25000003 PHARM REV CODE 250: Performed by: STUDENT IN AN ORGANIZED HEALTH CARE EDUCATION/TRAINING PROGRAM

## 2023-09-11 PROCEDURE — 11000004 HC SNF PRIVATE

## 2023-09-11 PROCEDURE — 97110 THERAPEUTIC EXERCISES: CPT

## 2023-09-11 RX ORDER — LINEZOLID 600 MG/1
600 TABLET, FILM COATED ORAL EVERY 12 HOURS
Status: COMPLETED | OUTPATIENT
Start: 2023-09-11 | End: 2023-09-26

## 2023-09-11 RX ADMIN — INSULIN ASPART 7 UNITS: 100 INJECTION, SOLUTION INTRAVENOUS; SUBCUTANEOUS at 11:09

## 2023-09-11 RX ADMIN — MEROPENEM 1 G: 1 INJECTION, POWDER, FOR SOLUTION INTRAVENOUS at 08:09

## 2023-09-11 RX ADMIN — SODIUM CHLORIDE: 9 INJECTION, SOLUTION INTRAVENOUS at 12:09

## 2023-09-11 RX ADMIN — LINEZOLID 600 MG: 600 TABLET, FILM COATED ORAL at 12:09

## 2023-09-11 RX ADMIN — OXYCODONE HYDROCHLORIDE AND ACETAMINOPHEN 500 MG: 500 TABLET ORAL at 08:09

## 2023-09-11 RX ADMIN — INSULIN ASPART 2 UNITS: 100 INJECTION, SOLUTION INTRAVENOUS; SUBCUTANEOUS at 11:09

## 2023-09-11 RX ADMIN — SODIUM CHLORIDE: 9 INJECTION, SOLUTION INTRAVENOUS at 04:09

## 2023-09-11 RX ADMIN — LINEZOLID 600 MG: 600 TABLET, FILM COATED ORAL at 08:09

## 2023-09-11 RX ADMIN — CARVEDILOL 12.5 MG: 12.5 TABLET, FILM COATED ORAL at 05:09

## 2023-09-11 RX ADMIN — SITAGLIPTIN 100 MG: 50 TABLET, FILM COATED ORAL at 08:09

## 2023-09-11 RX ADMIN — FERROUS SULFATE TAB 325 MG (65 MG ELEMENTAL FE) 1 EACH: 325 (65 FE) TAB at 08:09

## 2023-09-11 RX ADMIN — HYDROCODONE BITARTRATE AND ACETAMINOPHEN 1 TABLET: 10; 325 TABLET ORAL at 11:09

## 2023-09-11 RX ADMIN — INSULIN DETEMIR 20 UNITS: 100 INJECTION, SOLUTION SUBCUTANEOUS at 08:09

## 2023-09-11 RX ADMIN — CEFEPIME 2 G: 2 INJECTION, POWDER, FOR SOLUTION INTRAVENOUS at 04:09

## 2023-09-11 RX ADMIN — MEROPENEM 1 G: 1 INJECTION, POWDER, FOR SOLUTION INTRAVENOUS at 12:09

## 2023-09-11 RX ADMIN — CARVEDILOL 12.5 MG: 12.5 TABLET, FILM COATED ORAL at 08:09

## 2023-09-11 RX ADMIN — SODIUM CHLORIDE: 9 INJECTION, SOLUTION INTRAVENOUS at 08:09

## 2023-09-11 RX ADMIN — MULTIPLE VITAMINS W/ MINERALS TAB 1 TABLET: TAB at 08:09
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
## 2023-09-11 NOTE — PT/OT/SLP PROGRESS
Physical Therapy Treatment Note           Name: Antonio Galvan II    : 1965 (57 y.o.)  MRN: [id number]           TREATMENT SUMMARY AND RECOMMENDATIONS:    PT Received On: 23  PT Start Time: 859     PT Stop Time: 927  PT Total Time (min): 28 min     Subjective Assessment:   No complaints  Lethargic   x Awake, alert, cooperative  Uncooperative    Agitated  c/o pain    Appropriate  c/o fatigue    Confused x Treated at bedside     Emotionally labile  Treated in gym/dept.    Impulsive  Other:    Flat affect       Therapy Precautions:    Cognitive deficits  Spinal precautions    Collar - hard  Sternal precautions    Collar - soft   TLSO    Fall risk  LSO    Hip precautions - posterior  Knee immobilizer    Hip precautions - anterior  WBAT    Impaired communication  Partial weightbearing    Oxygen  TTWB    PEG tube  NWB    Visual deficits x Other: perineural catheter, PICC line, pressure relief boots     Hearing deficits  x Quadriplegia        Treatment Objectives:     Mobility Training:   Assist level Comments    Bed mobility     Transfer     Gait     Sit to stand transitions     Sitting balance     Standing balance      Wheelchair mobility     Car transfer     Other:          Therapeutic Exercise:   Exercise Sets Reps Comments   PROM to B UE and LE, all joints and digits   All functional planes to tolerance with sustained hold at end ranges                         Additional Comments:  ROM tasks completed to prevent further contractures and ensure ability for positioning in WC, along with proper positioning to reduce further skin break down. Pt with improved ROM this morning and less mm tension demonstrated. PT assisted mom in underarm cleaning tasks due to reports pt has not had a bath recently and felt like he was staring to smell.     Bed is not working, head and feet with not move with remote. Nursing in there trying to help fix    NPO until after MRI completed    Assessment: Patient  tolerated session well.    PT Plan: continue per POC  Revisions made to plan of care: No    GOALS:   Multidisciplinary Problems       Physical Therapy Goals          Problem: Physical Therapy    Goal Priority Disciplines Outcome Goal Variances Interventions   Physical Therapy Goal     PT, PT/OT Ongoing, Progressing     Description: Goals to be met by: Discharge     Patient will increase functional independence with mobility by performin.  Pt to receive PROM BLEs and UEs 5-6 d/wk, qd,  to prevent further contractures and ensure ability for positioning in WC, along with proper positioning to reduce further skin break down  2.  Pt to be assessed for appropriateness for out of bed to WC - awaiting wound care                       Skilled PT Minutes Provided: 25   Communication with Treatment Team:     Equipment recommendations:       At end of treatment, patient remained:   Up in chair     Up in wheelchair in room   x In bed    With alarm activated   x Bed rails up   x Call bell in reach    x Family/friends present    Restraints secured properly    In bathroom with CNA/RN notified    Nurse aware    In gym with therapist/tech    Other:

## 2023-09-11 NOTE — PLAN OF CARE
Problem: Adult Inpatient Plan of Care  Goal: Plan of Care Review  Outcome: Ongoing, Progressing  Goal: Patient-Specific Goal (Individualized)  Outcome: Ongoing, Progressing  Flowsheets (Taken 9/11/2023 0135)  Anxieties, Fears or Concerns: Pt/family concerned pt may have had a stroke/Covid d/t pt not being able to taste, altered feeling under tongue, and forgetting his daily prayers.  Individualized Care Needs: Monitor for s/s of stroke, monitor BP and HR, monitor blood glucose, IV abx, wound care inform MD of any changes  Goal: Absence of Hospital-Acquired Illness or Injury  Outcome: Ongoing, Progressing  Intervention: Prevent and Manage VTE (Venous Thromboembolism) Risk  Flowsheets (Taken 9/10/2023 2000)  VTE Prevention/Management:   bleeding risk assessed   bleeding risk factor(s) identified, provider notified   fluids promoted   ROM (active) performed  Intervention: Prevent Infection  Flowsheets (Taken 9/11/2023 0135)  Infection Prevention:   hand hygiene promoted   personal protective equipment utilized   rest/sleep promoted   single patient room provided  Goal: Optimal Comfort and Wellbeing  Outcome: Ongoing, Progressing  Intervention: Monitor Pain and Promote Comfort  Flowsheets (Taken 9/10/2023 2000)  Pain Management Interventions:   care clustered   diversional activity provided   pain management plan reviewed with patient/caregiver   pillow support provided   position adjusted   quiet environment facilitated   relaxation techniques promoted     Problem: Diabetes Comorbidity  Goal: Blood Glucose Level Within Targeted Range  Outcome: Ongoing, Progressing

## 2023-09-11 NOTE — NURSING
Returned call to Megan Thomas(girlfriend of pt), she is concerned about his symptoms (no sense of taste or smell) being signs of a stroke. She did not have his MRN, information was not given per facility policy.

## 2023-09-11 NOTE — PLAN OF CARE
Problem: Adult Inpatient Plan of Care  Goal: Plan of Care Review  Outcome: Ongoing, Progressing  Flowsheets (Taken 9/11/2023 0936)  Plan of Care Reviewed With:   patient   family  Goal: Patient-Specific Goal (Individualized)  Outcome: Ongoing, Progressing  Flowsheets (Taken 9/11/2023 0936)  Anxieties, Fears or Concerns: patient concerned b/c he is not feeling like himself, not sleeping much at all per patient  Individualized Care Needs: MRI ordered for this morning, monitor vs, wound care, hold abt therapy until further eval  Goal: Absence of Hospital-Acquired Illness or Injury  Outcome: Ongoing, Progressing  Intervention: Identify and Manage Fall Risk  Flowsheets (Taken 9/11/2023 0936)  Safety Promotion/Fall Prevention:   assistive device/personal item within reach   medications reviewed   instructed to call staff for mobility   room near unit station  Intervention: Prevent Skin Injury  Flowsheets (Taken 9/11/2023 0936)  Body Position:   turned   sitting up in bed   legs elevated  Skin Protection:   adhesive use limited   drying agents applied   incontinence pads utilized   silicone foam dressing in place   tubing/devices free from skin contact  Intervention: Prevent and Manage VTE (Venous Thromboembolism) Risk  Flowsheets (Taken 9/11/2023 0936)  Activity Management: Rolling - L1  VTE Prevention/Management:   bleeding precations maintained   bleeding risk assessed   ROM (passive) performed  Range of Motion: ROM (range of motion) performed  Intervention: Prevent Infection  Flowsheets (Taken 9/11/2023 0936)  Infection Prevention:   cohorting utilized   environmental surveillance performed   equipment surfaces disinfected   hand hygiene promoted   personal protective equipment utilized   single patient room provided  Goal: Optimal Comfort and Wellbeing  Outcome: Ongoing, Progressing  Intervention: Monitor Pain and Promote Comfort  Flowsheets (Taken 9/11/2023 0936)  Pain Management Interventions:   care clustered    pillow support provided   position adjusted   pain management plan reviewed with patient/caregiver   relaxation techniques promoted  Intervention: Provide Person-Centered Care  Flowsheets (Taken 9/11/2023 0936)  Trust Relationship/Rapport:   care explained   choices provided   questions answered   reassurance provided  Goal: Readiness for Transition of Care  Outcome: Ongoing, Progressing     Problem: Diabetes Comorbidity  Goal: Blood Glucose Level Within Targeted Range  Outcome: Ongoing, Progressing  Intervention: Monitor and Manage Glycemia  Flowsheets (Taken 9/11/2023 0936)  Glycemic Management: blood glucose monitored     Problem: Adjustment to Illness (Sepsis/Septic Shock)  Goal: Optimal Coping  Outcome: Ongoing, Progressing     Problem: Bleeding (Sepsis/Septic Shock)  Goal: Absence of Bleeding  Outcome: Ongoing, Progressing     Problem: Glycemic Control Impaired (Sepsis/Septic Shock)  Goal: Blood Glucose Level Within Desired Range  Outcome: Ongoing, Progressing     Problem: Infection Progression (Sepsis/Septic Shock)  Goal: Absence of Infection Signs and Symptoms  Outcome: Ongoing, Progressing     Problem: Nutrition Impaired (Sepsis/Septic Shock)  Goal: Optimal Nutrition Intake  Outcome: Ongoing, Progressing     Problem: Infection  Goal: Absence of Infection Signs and Symptoms  Outcome: Ongoing, Progressing  Intervention: Prevent or Manage Infection  Flowsheets (Taken 9/11/2023 0936)  Fever Reduction/Comfort Measures: lightweight bedding  Infection Management: aseptic technique maintained  Isolation Precautions:   precautions maintained   contact     Problem: Impaired Wound Healing  Goal: Optimal Wound Healing  Outcome: Ongoing, Progressing     Problem: Skin Injury Risk Increased  Goal: Skin Health and Integrity  Outcome: Ongoing, Progressing     Problem: Fall Injury Risk  Goal: Absence of Fall and Fall-Related Injury  Outcome: Ongoing, Progressing

## 2023-09-12 LAB
ANION GAP SERPL CALC-SCNC: 9 MEQ/L
BASOPHILS # BLD AUTO: 0.04 X10(3)/MCL
BASOPHILS NFR BLD AUTO: 0.8 %
BUN SERPL-MCNC: 42.2 MG/DL (ref 8.4–25.7)
CALCIUM SERPL-MCNC: 8.1 MG/DL (ref 8.4–10.2)
CHLORIDE SERPL-SCNC: 109 MMOL/L (ref 98–107)
CO2 SERPL-SCNC: 22 MMOL/L (ref 22–29)
CREAT SERPL-MCNC: 1.45 MG/DL (ref 0.73–1.18)
CREAT/UREA NIT SERPL: 29
EOSINOPHIL # BLD AUTO: 0.34 X10(3)/MCL (ref 0–0.9)
EOSINOPHIL NFR BLD AUTO: 7.1 %
ERYTHROCYTE [DISTWIDTH] IN BLOOD BY AUTOMATED COUNT: 17.9 % (ref 11.5–17)
GFR SERPLBLD CREATININE-BSD FMLA CKD-EPI: 57 MLS/MIN/1.73/M2
GLUCOSE SERPL-MCNC: 169 MG/DL (ref 74–100)
HCT VFR BLD AUTO: 30.1 % (ref 42–52)
HGB BLD-MCNC: 9 G/DL (ref 14–18)
IMM GRANULOCYTES # BLD AUTO: 0.03 X10(3)/MCL (ref 0–0.04)
IMM GRANULOCYTES NFR BLD AUTO: 0.6 %
LYMPHOCYTES # BLD AUTO: 1.83 X10(3)/MCL (ref 0.6–4.6)
LYMPHOCYTES NFR BLD AUTO: 38.2 %
MCH RBC QN AUTO: 27 PG (ref 27–31)
MCHC RBC AUTO-ENTMCNC: 29.9 G/DL (ref 33–36)
MCV RBC AUTO: 90.4 FL (ref 80–94)
MONOCYTES # BLD AUTO: 0.42 X10(3)/MCL (ref 0.1–1.3)
MONOCYTES NFR BLD AUTO: 8.8 %
NEUTROPHILS # BLD AUTO: 2.13 X10(3)/MCL (ref 2.1–9.2)
NEUTROPHILS NFR BLD AUTO: 44.5 %
PLATELET # BLD AUTO: 195 X10(3)/MCL (ref 130–400)
PMV BLD AUTO: 10.4 FL (ref 7.4–10.4)
POCT GLUCOSE: 115 MG/DL (ref 70–110)
POCT GLUCOSE: 138 MG/DL (ref 70–110)
POCT GLUCOSE: 184 MG/DL (ref 70–110)
POCT GLUCOSE: 81 MG/DL (ref 70–110)
POTASSIUM SERPL-SCNC: 3.9 MMOL/L (ref 3.5–5.1)
RBC # BLD AUTO: 3.33 X10(6)/MCL (ref 4.7–6.1)
SODIUM SERPL-SCNC: 140 MMOL/L (ref 136–145)
WBC # SPEC AUTO: 4.79 X10(3)/MCL (ref 4.5–11.5)

## 2023-09-12 PROCEDURE — 97110 THERAPEUTIC EXERCISES: CPT

## 2023-09-12 PROCEDURE — 11000004 HC SNF PRIVATE

## 2023-09-12 PROCEDURE — 25000003 PHARM REV CODE 250: Performed by: STUDENT IN AN ORGANIZED HEALTH CARE EDUCATION/TRAINING PROGRAM

## 2023-09-12 PROCEDURE — 27000207 HC ISOLATION

## 2023-09-12 PROCEDURE — 80048 BASIC METABOLIC PNL TOTAL CA: CPT | Performed by: STUDENT IN AN ORGANIZED HEALTH CARE EDUCATION/TRAINING PROGRAM

## 2023-09-12 PROCEDURE — 85025 COMPLETE CBC W/AUTO DIFF WBC: CPT | Performed by: STUDENT IN AN ORGANIZED HEALTH CARE EDUCATION/TRAINING PROGRAM

## 2023-09-12 PROCEDURE — 63600175 PHARM REV CODE 636 W HCPCS: Performed by: STUDENT IN AN ORGANIZED HEALTH CARE EDUCATION/TRAINING PROGRAM

## 2023-09-12 PROCEDURE — 25000003 PHARM REV CODE 250: Performed by: INTERNAL MEDICINE

## 2023-09-12 RX ORDER — ASPIRIN 81 MG/1
81 TABLET ORAL DAILY
Status: DISCONTINUED | OUTPATIENT
Start: 2023-09-12 | End: 2023-09-27 | Stop reason: HOSPADM

## 2023-09-12 RX ADMIN — MEROPENEM 1 G: 1 INJECTION, POWDER, FOR SOLUTION INTRAVENOUS at 12:09

## 2023-09-12 RX ADMIN — INSULIN ASPART 7 UNITS: 100 INJECTION, SOLUTION INTRAVENOUS; SUBCUTANEOUS at 07:09

## 2023-09-12 RX ADMIN — OXYCODONE HYDROCHLORIDE AND ACETAMINOPHEN 500 MG: 500 TABLET ORAL at 09:09

## 2023-09-12 RX ADMIN — CARVEDILOL 12.5 MG: 12.5 TABLET, FILM COATED ORAL at 07:09

## 2023-09-12 RX ADMIN — FERROUS SULFATE TAB 325 MG (65 MG ELEMENTAL FE) 1 EACH: 325 (65 FE) TAB at 09:09

## 2023-09-12 RX ADMIN — LINEZOLID 600 MG: 600 TABLET, FILM COATED ORAL at 09:09

## 2023-09-12 RX ADMIN — ASPIRIN 81 MG: 81 TABLET, COATED ORAL at 09:09

## 2023-09-12 RX ADMIN — INSULIN ASPART 7 UNITS: 100 INJECTION, SOLUTION INTRAVENOUS; SUBCUTANEOUS at 11:09

## 2023-09-12 RX ADMIN — SODIUM CHLORIDE: 9 INJECTION, SOLUTION INTRAVENOUS at 12:09

## 2023-09-12 RX ADMIN — SODIUM CHLORIDE: 9 INJECTION, SOLUTION INTRAVENOUS at 08:09

## 2023-09-12 RX ADMIN — SITAGLIPTIN 100 MG: 50 TABLET, FILM COATED ORAL at 09:09

## 2023-09-12 RX ADMIN — INSULIN ASPART 2 UNITS: 100 INJECTION, SOLUTION INTRAVENOUS; SUBCUTANEOUS at 11:09

## 2023-09-12 RX ADMIN — MEROPENEM 1 G: 1 INJECTION, POWDER, FOR SOLUTION INTRAVENOUS at 08:09

## 2023-09-12 RX ADMIN — MULTIPLE VITAMINS W/ MINERALS TAB 1 TABLET: TAB at 09:09

## 2023-09-12 RX ADMIN — CARVEDILOL 12.5 MG: 12.5 TABLET, FILM COATED ORAL at 04:09

## 2023-09-12 RX ADMIN — MEROPENEM 1 G: 1 INJECTION, POWDER, FOR SOLUTION INTRAVENOUS at 04:09

## 2023-09-12 RX ADMIN — SODIUM CHLORIDE: 9 INJECTION, SOLUTION INTRAVENOUS at 04:09

## 2023-09-12 RX ADMIN — LINEZOLID 600 MG: 600 TABLET, FILM COATED ORAL at 08:09

## 2023-09-12 RX ADMIN — INSULIN ASPART 7 UNITS: 100 INJECTION, SOLUTION INTRAVENOUS; SUBCUTANEOUS at 04:09

## 2023-09-12 NOTE — PT/OT/SLP PROGRESS
Physical Therapy Treatment Note           Name: Antonio Galvan II    : 1965 (57 y.o.)  MRN: [id number]           TREATMENT SUMMARY AND RECOMMENDATIONS:    PT Received On: 23  PT Start Time: 849     PT Stop Time: 912  PT Total Time (min): 23 min     Subjective Assessment:   No complaints  Lethargic   x Awake, alert, cooperative  Uncooperative    Agitated  c/o pain    Appropriate  c/o fatigue    Confused x Treated at bedside     Emotionally labile  Treated in gym/dept.    Impulsive  Other:    Flat affect       Therapy Precautions:    Cognitive deficits  Spinal precautions    Collar - hard  Sternal precautions    Collar - soft   TLSO    Fall risk  LSO    Hip precautions - posterior  Knee immobilizer    Hip precautions - anterior  WBAT    Impaired communication  Partial weightbearing    Oxygen  TTWB    PEG tube  NWB    Visual deficits x Other: perineural catheter, PICC line, pressure relief boots     Hearing deficits  x Quadriplegia        Treatment Objectives:     Mobility Training:   Assist level Comments    Bed mobility     Transfer     Gait     Sit to stand transitions     Sitting balance     Standing balance      Wheelchair mobility     Car transfer     Other:          Therapeutic Exercise:   Exercise Sets Reps Comments   PROM to B UE and LE, all joints and digits   All functional planes to tolerance with sustained hold at end ranges                         Additional Comments:  ROM tasks completed to prevent further contractures and ensure ability for positioning in WC, along with proper positioning to reduce further skin break down. More tone in R LE today. More clonus in B UE during PROM. Overall fair tolerance.     Pt was unable to fit in MRI yesterday, so not completed.     Pt reports speech and cognition have improved and he feels back to normal     Assessment: Patient tolerated session well.    PT Plan: continue per POC  Revisions made to plan of care: No    GOALS:    Multidisciplinary Problems       Physical Therapy Goals          Problem: Physical Therapy    Goal Priority Disciplines Outcome Goal Variances Interventions   Physical Therapy Goal     PT, PT/OT Ongoing, Progressing     Description: Goals to be met by: Discharge     Patient will increase functional independence with mobility by performin.  Pt to receive PROM BLEs and UEs 5-6 d/wk, qd,  to prevent further contractures and ensure ability for positioning in WC, along with proper positioning to reduce further skin break down  2.  Pt to be assessed for appropriateness for out of bed to WC - awaiting wound care                       Skilled PT Minutes Provided: 23   Communication with Treatment Team:     Equipment recommendations:       At end of treatment, patient remained:   Up in chair     Up in wheelchair in room   x In bed    With alarm activated   x Bed rails up   x Call bell in reach    x Family/friends present    Restraints secured properly    In bathroom with CNA/RN notified    Nurse aware    In gym with therapist/tech    Other:

## 2023-09-12 NOTE — PLAN OF CARE
Problem: Adult Inpatient Plan of Care  Goal: Plan of Care Review  Outcome: Ongoing, Progressing  Flowsheets (Taken 9/11/2023 1951)  Plan of Care Reviewed With: patient  Goal: Patient-Specific Goal (Individualized)  Outcome: Ongoing, Progressing  Flowsheets (Taken 9/11/2023 1951)  Anxieties, Fears or Concerns: none stated  Individualized Care Needs: monitor pt for s/s of pain until end of shift 7a  Goal: Absence of Hospital-Acquired Illness or Injury  Outcome: Ongoing, Progressing  Intervention: Identify and Manage Fall Risk  Flowsheets (Taken 9/11/2023 1951)  Safety Promotion/Fall Prevention:   assistive device/personal item within reach   bed alarm set   Fall Risk reviewed with patient/family   side rails raised x 3  Intervention: Prevent Skin Injury  Flowsheets (Taken 9/11/2023 1951)  Body Position: sitting up in bed  Skin Protection: adhesive use limited  Intervention: Prevent and Manage VTE (Venous Thromboembolism) Risk  Flowsheets (Taken 9/11/2023 1951)  Activity Management: Rolling - L1  VTE Prevention/Management:   bleeding precations maintained   bleeding risk assessed  Range of Motion: active ROM (range of motion) encouraged  Intervention: Prevent Infection  Flowsheets (Taken 9/11/2023 1951)  Infection Prevention:   environmental surveillance performed   personal protective equipment utilized   rest/sleep promoted   single patient room provided   equipment surfaces disinfected   hand hygiene promoted

## 2023-09-12 NOTE — PLAN OF CARE
Problem: Adult Inpatient Plan of Care  Goal: Plan of Care Review  Outcome: Ongoing, Progressing  Flowsheets (Taken 9/12/2023 1855)  Plan of Care Reviewed With:   patient   mother  Goal: Patient-Specific Goal (Individualized)  Outcome: Ongoing, Progressing  Flowsheets (Taken 9/12/2023 1855)  Anxieties, Fears or Concerns: happy that he's feeling better, doesnt want to take as many meds  Individualized Care Needs: md d/c'd lexapro  Goal: Absence of Hospital-Acquired Illness or Injury  Outcome: Ongoing, Progressing  Intervention: Identify and Manage Fall Risk  Flowsheets (Taken 9/12/2023 1855)  Safety Promotion/Fall Prevention:   assistive device/personal item within reach   medications reviewed   room near unit station   instructed to call staff for mobility  Intervention: Prevent Skin Injury  Flowsheets (Taken 9/12/2023 1855)  Body Position:   turned   sitting up in bed  Skin Protection:   adhesive use limited   drying agents applied   incontinence pads utilized   tubing/devices free from skin contact  Intervention: Prevent and Manage VTE (Venous Thromboembolism) Risk  Flowsheets (Taken 9/12/2023 1855)  Activity Management: Rolling - L1  VTE Prevention/Management:   remove, assess skin, and reapply sequential compression device   bleeding precations maintained   bleeding risk assessed   ROM (passive) performed  Range of Motion: ROM (range of motion) performed  Intervention: Prevent Infection  Flowsheets (Taken 9/12/2023 1855)  Infection Prevention:   hand hygiene promoted   environmental surveillance performed   equipment surfaces disinfected   personal protective equipment utilized   single patient room provided  Goal: Optimal Comfort and Wellbeing  Outcome: Ongoing, Progressing  Intervention: Monitor Pain and Promote Comfort  Flowsheets (Taken 9/12/2023 1855)  Pain Management Interventions:   care clustered   pain management plan reviewed with patient/caregiver   position adjusted   pillow support provided    relaxation techniques promoted   quiet environment facilitated  Intervention: Provide Person-Centered Care  Flowsheets (Taken 9/12/2023 9093)  Trust Relationship/Rapport:   care explained   choices provided   reassurance provided  Goal: Readiness for Transition of Care  Outcome: Ongoing, Progressing     Problem: Diabetes Comorbidity  Goal: Blood Glucose Level Within Targeted Range  Outcome: Ongoing, Progressing  Intervention: Monitor and Manage Glycemia  Flowsheets (Taken 9/12/2023 0299)  Glycemic Management:   blood glucose monitored   supplemental insulin given     Problem: Adjustment to Illness (Sepsis/Septic Shock)  Goal: Optimal Coping  Outcome: Ongoing, Progressing     Problem: Bleeding (Sepsis/Septic Shock)  Goal: Absence of Bleeding  Outcome: Ongoing, Progressing     Problem: Glycemic Control Impaired (Sepsis/Septic Shock)  Goal: Blood Glucose Level Within Desired Range  Outcome: Ongoing, Progressing     Problem: Infection Progression (Sepsis/Septic Shock)  Goal: Absence of Infection Signs and Symptoms  Outcome: Ongoing, Progressing     Problem: Nutrition Impaired (Sepsis/Septic Shock)  Goal: Optimal Nutrition Intake  Outcome: Ongoing, Progressing     Problem: Infection  Goal: Absence of Infection Signs and Symptoms  Outcome: Ongoing, Progressing     Problem: Impaired Wound Healing  Goal: Optimal Wound Healing  Outcome: Ongoing, Progressing     Problem: Skin Injury Risk Increased  Goal: Skin Health and Integrity  Outcome: Ongoing, Progressing     Problem: Fall Injury Risk  Goal: Absence of Fall and Fall-Related Injury  Outcome: Ongoing, Progressing
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
## 2023-09-13 LAB
ALBUMIN SERPL-MCNC: 1.4 G/DL (ref 3.5–5)
ALBUMIN/GLOB SERPL: 0.4 RATIO (ref 1.1–2)
ALP SERPL-CCNC: 95 UNIT/L (ref 40–150)
ALT SERPL-CCNC: 12 UNIT/L (ref 0–55)
AST SERPL-CCNC: 11 UNIT/L (ref 5–34)
BASOPHILS # BLD AUTO: 0.03 X10(3)/MCL
BASOPHILS NFR BLD AUTO: 0.7 %
BILIRUB SERPL-MCNC: 0.2 MG/DL
BUN SERPL-MCNC: 37.8 MG/DL (ref 8.4–25.7)
CALCIUM SERPL-MCNC: 8.3 MG/DL (ref 8.4–10.2)
CHLORIDE SERPL-SCNC: 107 MMOL/L (ref 98–107)
CO2 SERPL-SCNC: 24 MMOL/L (ref 22–29)
CREAT SERPL-MCNC: 1.23 MG/DL (ref 0.73–1.18)
EOSINOPHIL # BLD AUTO: 0.3 X10(3)/MCL (ref 0–0.9)
EOSINOPHIL NFR BLD AUTO: 6.6 %
ERYTHROCYTE [DISTWIDTH] IN BLOOD BY AUTOMATED COUNT: 18.1 % (ref 11.5–17)
GFR SERPLBLD CREATININE-BSD FMLA CKD-EPI: >60 MLS/MIN/1.73/M2
GLOBULIN SER-MCNC: 4 GM/DL (ref 2.4–3.5)
GLUCOSE SERPL-MCNC: 176 MG/DL (ref 74–100)
HCT VFR BLD AUTO: 31.9 % (ref 42–52)
HGB BLD-MCNC: 9.7 G/DL (ref 14–18)
IMM GRANULOCYTES # BLD AUTO: 0.01 X10(3)/MCL (ref 0–0.04)
IMM GRANULOCYTES NFR BLD AUTO: 0.2 %
LYMPHOCYTES # BLD AUTO: 1.66 X10(3)/MCL (ref 0.6–4.6)
LYMPHOCYTES NFR BLD AUTO: 36.6 %
MAGNESIUM SERPL-MCNC: 1.8 MG/DL (ref 1.6–2.6)
MCH RBC QN AUTO: 27.6 PG (ref 27–31)
MCHC RBC AUTO-ENTMCNC: 30.4 G/DL (ref 33–36)
MCV RBC AUTO: 90.9 FL (ref 80–94)
MONOCYTES # BLD AUTO: 0.41 X10(3)/MCL (ref 0.1–1.3)
MONOCYTES NFR BLD AUTO: 9 %
NEUTROPHILS # BLD AUTO: 2.13 X10(3)/MCL (ref 2.1–9.2)
NEUTROPHILS NFR BLD AUTO: 46.9 %
PHOSPHATE SERPL-MCNC: 3.6 MG/DL (ref 2.3–4.7)
PLATELET # BLD AUTO: 190 X10(3)/MCL (ref 130–400)
PMV BLD AUTO: 10.5 FL (ref 7.4–10.4)
POCT GLUCOSE: 176 MG/DL (ref 70–110)
POCT GLUCOSE: 178 MG/DL (ref 70–110)
POCT GLUCOSE: 210 MG/DL (ref 70–110)
POTASSIUM SERPL-SCNC: 4.1 MMOL/L (ref 3.5–5.1)
PROT SERPL-MCNC: 5.4 GM/DL (ref 6.4–8.3)
RBC # BLD AUTO: 3.51 X10(6)/MCL (ref 4.7–6.1)
SODIUM SERPL-SCNC: 139 MMOL/L (ref 136–145)
WBC # SPEC AUTO: 4.54 X10(3)/MCL (ref 4.5–11.5)

## 2023-09-13 PROCEDURE — 27000207 HC ISOLATION

## 2023-09-13 PROCEDURE — 63600175 PHARM REV CODE 636 W HCPCS: Performed by: STUDENT IN AN ORGANIZED HEALTH CARE EDUCATION/TRAINING PROGRAM

## 2023-09-13 PROCEDURE — 80053 COMPREHEN METABOLIC PANEL: CPT | Performed by: INTERNAL MEDICINE

## 2023-09-13 PROCEDURE — 97110 THERAPEUTIC EXERCISES: CPT

## 2023-09-13 PROCEDURE — 83735 ASSAY OF MAGNESIUM: CPT | Performed by: INTERNAL MEDICINE

## 2023-09-13 PROCEDURE — 85025 COMPLETE CBC W/AUTO DIFF WBC: CPT | Performed by: INTERNAL MEDICINE

## 2023-09-13 PROCEDURE — 94799 UNLISTED PULMONARY SVC/PX: CPT

## 2023-09-13 PROCEDURE — 94760 N-INVAS EAR/PLS OXIMETRY 1: CPT

## 2023-09-13 PROCEDURE — 11000004 HC SNF PRIVATE

## 2023-09-13 PROCEDURE — 97535 SELF CARE MNGMENT TRAINING: CPT | Mod: CQ

## 2023-09-13 PROCEDURE — 25000003 PHARM REV CODE 250: Performed by: INTERNAL MEDICINE

## 2023-09-13 PROCEDURE — 84100 ASSAY OF PHOSPHORUS: CPT | Performed by: INTERNAL MEDICINE

## 2023-09-13 PROCEDURE — 25000003 PHARM REV CODE 250: Performed by: STUDENT IN AN ORGANIZED HEALTH CARE EDUCATION/TRAINING PROGRAM

## 2023-09-13 RX ADMIN — MEROPENEM 1 G: 1 INJECTION, POWDER, FOR SOLUTION INTRAVENOUS at 09:09

## 2023-09-13 RX ADMIN — LINEZOLID 600 MG: 600 TABLET, FILM COATED ORAL at 09:09

## 2023-09-13 RX ADMIN — ASPIRIN 81 MG: 81 TABLET, COATED ORAL at 09:09

## 2023-09-13 RX ADMIN — INSULIN ASPART 7 UNITS: 100 INJECTION, SOLUTION INTRAVENOUS; SUBCUTANEOUS at 11:09

## 2023-09-13 RX ADMIN — SODIUM CHLORIDE: 9 INJECTION, SOLUTION INTRAVENOUS at 09:09

## 2023-09-13 RX ADMIN — INSULIN ASPART 7 UNITS: 100 INJECTION, SOLUTION INTRAVENOUS; SUBCUTANEOUS at 07:09

## 2023-09-13 RX ADMIN — MEROPENEM 1 G: 1 INJECTION, POWDER, FOR SOLUTION INTRAVENOUS at 04:09

## 2023-09-13 RX ADMIN — SODIUM CHLORIDE: 9 INJECTION, SOLUTION INTRAVENOUS at 01:09

## 2023-09-13 RX ADMIN — MEROPENEM 1 G: 1 INJECTION, POWDER, FOR SOLUTION INTRAVENOUS at 01:09

## 2023-09-13 RX ADMIN — SITAGLIPTIN 100 MG: 50 TABLET, FILM COATED ORAL at 09:09

## 2023-09-13 RX ADMIN — MULTIPLE VITAMINS W/ MINERALS TAB 1 TABLET: TAB at 09:09

## 2023-09-13 RX ADMIN — INSULIN ASPART 4 UNITS: 100 INJECTION, SOLUTION INTRAVENOUS; SUBCUTANEOUS at 11:09

## 2023-09-13 RX ADMIN — INSULIN ASPART 2 UNITS: 100 INJECTION, SOLUTION INTRAVENOUS; SUBCUTANEOUS at 05:09

## 2023-09-13 RX ADMIN — CARVEDILOL 12.5 MG: 12.5 TABLET, FILM COATED ORAL at 05:09

## 2023-09-13 RX ADMIN — OXYCODONE HYDROCHLORIDE AND ACETAMINOPHEN 500 MG: 500 TABLET ORAL at 09:09

## 2023-09-13 RX ADMIN — INSULIN DETEMIR 20 UNITS: 100 INJECTION, SOLUTION SUBCUTANEOUS at 09:09

## 2023-09-13 RX ADMIN — INSULIN ASPART 7 UNITS: 100 INJECTION, SOLUTION INTRAVENOUS; SUBCUTANEOUS at 05:09

## 2023-09-13 RX ADMIN — FERROUS SULFATE TAB 325 MG (65 MG ELEMENTAL FE) 1 EACH: 325 (65 FE) TAB at 09:09

## 2023-09-13 RX ADMIN — CARVEDILOL 12.5 MG: 12.5 TABLET, FILM COATED ORAL at 07:09

## 2023-09-13 NOTE — PT/OT/SLP PROGRESS
Physical Therapy Treatment Note           Name: Antonio Galvan II    : 1965 (57 y.o.)  MRN: [id number]           TREATMENT SUMMARY AND RECOMMENDATIONS:    PT Received On: 23  PT Start Time: 1100     PT Stop Time: 1123  PT Total Time (min): 23 min     Subjective Assessment:   No complaints x Lethargic    Awake, alert, cooperative  Uncooperative    Agitated  c/o pain    Appropriate x c/o fatigue    Confused x Treated at bedside     Emotionally labile  Treated in gym/dept.    Impulsive  Other:    Flat affect       Therapy Precautions:    Cognitive deficits  Spinal precautions    Collar - hard  Sternal precautions    Collar - soft   TLSO    Fall risk  LSO    Hip precautions - posterior  Knee immobilizer    Hip precautions - anterior  WBAT    Impaired communication  Partial weightbearing    Oxygen  TTWB    PEG tube  NWB    Visual deficits x Other: perineural catheter, PICC line, pressure relief boots     Hearing deficits  x Quadriplegia        Treatment Objectives:     Mobility Training:   Assist level Comments    Bed mobility     Transfer     Gait     Sit to stand transitions     Sitting balance     Standing balance      Wheelchair mobility     Car transfer     Other:          Therapeutic Exercise:   Exercise Sets Reps Comments   PROM to B UE and LE, all joints and digits   All functional planes to tolerance with sustained hold at end ranges                         Additional Comments:  Pt still sleeping, but ok'd PT to complete ROM tasks. ROM tasks completed to prevent further contractures and ensure ability for positioning in WC, along with proper positioning to reduce further skin break down.     Pt was in and out of sleep during PT session. BP and HR were normal.     Assessment: Patient tolerated session well.    PT Plan: continue per POC  Revisions made to plan of care: No    GOALS:   Multidisciplinary Problems       Physical Therapy Goals          Problem: Physical Therapy    Goal Priority  Disciplines Outcome Goal Variances Interventions   Physical Therapy Goal     PT, PT/OT Ongoing, Progressing     Description: Goals to be met by: Discharge     Patient will increase functional independence with mobility by performin.  Pt to receive PROM BLEs and UEs 5-6 d/wk, qd,  to prevent further contractures and ensure ability for positioning in WC, along with proper positioning to reduce further skin break down  2.  Pt to be assessed for appropriateness for out of bed to WC - awaiting wound care                       Skilled PT Minutes Provided: 23   Communication with Treatment Team:     Equipment recommendations:       At end of treatment, patient remained:   Up in chair     Up in wheelchair in room   x In bed    With alarm activated   x Bed rails up   x Call bell in reach    x Family/friends present    Restraints secured properly    In bathroom with CNA/RN notified    Nurse aware    In gym with therapist/tech    Other:

## 2023-09-13 NOTE — PLAN OF CARE
[facility]  Discharge Reassessment    Primary Care Provider: Jennifer Fernando NP    Expected Discharge Date:     Reassessment (most recent)       Discharge Reassessment - 09/13/23 1223          Discharge Reassessment    Assessment Type Discharge Planning Reassessment     Did the patient's condition or plan change since previous assessment? No     Discharge Plan discussed with: Parent(s)     Name(s) and Number(s) Nanct mother      Communicated SADE with patient/caregiver Date not available/Unable to determine     Discharge Plan A Home with family;Home Health     DME Needed Upon Discharge  none     Transition of Care Barriers None     Why the patient remains in the hospital Requires continued medical care        Post-Acute Status    Post-Acute Authorization Home Health     Home Health Status Pending medical clearance/testing     Hospital Resources/Appts/Education Provided Provided patient/caregiver with written discharge plan information     Discharge Delays None known at this time

## 2023-09-13 NOTE — PT/OT/SLP PROGRESS
Name: Antonio Galvan II    : 1965 (57 y.o.)  MRN: [id number]            Interdisciplinary Team Conference     Case conference held with patient/family and care team to discuss progress, plan of care, barriers to be addressed for safe return home, equipment recommendations, and discharge planning. Communicated therapy progress with MD, RN, therapy clinicians and case management. All questions/concerns answered.

## 2023-09-13 NOTE — PLAN OF CARE
Problem: Adult Inpatient Plan of Care  Goal: Plan of Care Review  Outcome: Ongoing, Progressing  Flowsheets (Taken 9/13/2023 0925)  Plan of Care Reviewed With: patient  Goal: Patient-Specific Goal (Individualized)  Outcome: Ongoing, Progressing  Flowsheets (Taken 9/13/2023 0925)  Anxieties, Fears or Concerns: continues to feel better, sleeping btwn care this morning  Individualized Care Needs: IV and oral abt, wound care, monitor vs and heart rhythm  Goal: Absence of Hospital-Acquired Illness or Injury  Outcome: Ongoing, Progressing  Intervention: Identify and Manage Fall Risk  Flowsheets (Taken 9/13/2023 0925)  Safety Promotion/Fall Prevention:   assistive device/personal item within reach   medications reviewed   lighting adjusted   room near unit station   instructed to call staff for mobility  Intervention: Prevent Skin Injury  Flowsheets (Taken 9/13/2023 0925)  Body Position:   turned   sitting up in bed  Skin Protection:   adhesive use limited   drying agents applied   incontinence pads utilized   skin sealant/moisture barrier applied   tubing/devices free from skin contact  Intervention: Prevent and Manage VTE (Venous Thromboembolism) Risk  Flowsheets (Taken 9/13/2023 0925)  Activity Management: Rolling - L1  VTE Prevention/Management:   bleeding precations maintained   bleeding risk assessed   ROM (passive) performed  Range of Motion: ROM (range of motion) performed  Intervention: Prevent Infection  Flowsheets (Taken 9/13/2023 0925)  Infection Prevention:   hand hygiene promoted   equipment surfaces disinfected   environmental surveillance performed   personal protective equipment utilized   rest/sleep promoted   single patient room provided  Goal: Optimal Comfort and Wellbeing  Outcome: Ongoing, Progressing  Intervention: Monitor Pain and Promote Comfort  Flowsheets (Taken 9/13/2023 0925)  Pain Management Interventions:   care clustered   pain management plan reviewed with patient/caregiver   pillow  support provided   position adjusted   relaxation techniques promoted   quiet environment facilitated  Intervention: Provide Person-Centered Care  Flowsheets (Taken 9/13/2023 0925)  Trust Relationship/Rapport:   care explained   choices provided   questions encouraged   reassurance provided  Goal: Readiness for Transition of Care  Outcome: Ongoing, Progressing     Problem: Diabetes Comorbidity  Goal: Blood Glucose Level Within Targeted Range  Outcome: Ongoing, Progressing  Intervention: Monitor and Manage Glycemia  Flowsheets (Taken 9/13/2023 0925)  Glycemic Management:   blood glucose monitored   supplemental insulin given     Problem: Adjustment to Illness (Sepsis/Septic Shock)  Goal: Optimal Coping  Outcome: Ongoing, Progressing     Problem: Bleeding (Sepsis/Septic Shock)  Goal: Absence of Bleeding  Outcome: Ongoing, Progressing     Problem: Glycemic Control Impaired (Sepsis/Septic Shock)  Goal: Blood Glucose Level Within Desired Range  Outcome: Ongoing, Progressing     Problem: Infection Progression (Sepsis/Septic Shock)  Goal: Absence of Infection Signs and Symptoms  Outcome: Ongoing, Progressing     Problem: Nutrition Impaired (Sepsis/Septic Shock)  Goal: Optimal Nutrition Intake  Outcome: Ongoing, Progressing     Problem: Infection  Goal: Absence of Infection Signs and Symptoms  Outcome: Ongoing, Progressing  Intervention: Prevent or Manage Infection  Flowsheets (Taken 9/13/2023 0925)  Fever Reduction/Comfort Measures: lightweight bedding  Infection Management: aseptic technique maintained  Isolation Precautions:   precautions maintained   contact     Problem: Impaired Wound Healing  Goal: Optimal Wound Healing  Outcome: Ongoing, Progressing     Problem: Skin Injury Risk Increased  Goal: Skin Health and Integrity  Outcome: Ongoing, Progressing     Problem: Fall Injury Risk  Goal: Absence of Fall and Fall-Related Injury  Outcome: Ongoing, Progressing
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
## 2023-09-13 NOTE — PT/OT/SLP PROGRESS
Name: Antonio Galvan II    : 1965 (57 y.o.)  MRN: [id number]           Patient Unavailable      Patient unable to be seen at this time secondary to: PT attempted at 0945 but pt was soundly sleeping. Sitter reports he did not sleep well. PT to return later in the AM

## 2023-09-14 LAB
POCT GLUCOSE: 107 MG/DL (ref 70–110)
POCT GLUCOSE: 110 MG/DL (ref 70–110)
POCT GLUCOSE: 116 MG/DL (ref 70–110)
POCT GLUCOSE: 150 MG/DL (ref 70–110)
POCT GLUCOSE: 210 MG/DL (ref 70–110)

## 2023-09-14 PROCEDURE — 25000003 PHARM REV CODE 250: Performed by: INTERNAL MEDICINE

## 2023-09-14 PROCEDURE — 25000003 PHARM REV CODE 250: Performed by: STUDENT IN AN ORGANIZED HEALTH CARE EDUCATION/TRAINING PROGRAM

## 2023-09-14 PROCEDURE — 11000004 HC SNF PRIVATE

## 2023-09-14 PROCEDURE — 63600175 PHARM REV CODE 636 W HCPCS: Performed by: STUDENT IN AN ORGANIZED HEALTH CARE EDUCATION/TRAINING PROGRAM

## 2023-09-14 PROCEDURE — 27000207 HC ISOLATION

## 2023-09-14 PROCEDURE — 97110 THERAPEUTIC EXERCISES: CPT

## 2023-09-14 PROCEDURE — 94760 N-INVAS EAR/PLS OXIMETRY 1: CPT

## 2023-09-14 RX ADMIN — FERROUS SULFATE TAB 325 MG (65 MG ELEMENTAL FE) 1 EACH: 325 (65 FE) TAB at 09:09

## 2023-09-14 RX ADMIN — SENNOSIDES AND DOCUSATE SODIUM 2 TABLET: 8.6; 5 TABLET ORAL at 09:09

## 2023-09-14 RX ADMIN — INSULIN ASPART 7 UNITS: 100 INJECTION, SOLUTION INTRAVENOUS; SUBCUTANEOUS at 07:09

## 2023-09-14 RX ADMIN — MEROPENEM 1 G: 1 INJECTION, POWDER, FOR SOLUTION INTRAVENOUS at 01:09

## 2023-09-14 RX ADMIN — SITAGLIPTIN 100 MG: 50 TABLET, FILM COATED ORAL at 09:09

## 2023-09-14 RX ADMIN — INSULIN ASPART 7 UNITS: 100 INJECTION, SOLUTION INTRAVENOUS; SUBCUTANEOUS at 11:09

## 2023-09-14 RX ADMIN — ASPIRIN 81 MG: 81 TABLET, COATED ORAL at 09:09

## 2023-09-14 RX ADMIN — SODIUM CHLORIDE: 9 INJECTION, SOLUTION INTRAVENOUS at 01:09

## 2023-09-14 RX ADMIN — MEROPENEM 1 G: 1 INJECTION, POWDER, FOR SOLUTION INTRAVENOUS at 04:09

## 2023-09-14 RX ADMIN — CARVEDILOL 12.5 MG: 12.5 TABLET, FILM COATED ORAL at 05:09

## 2023-09-14 RX ADMIN — MEROPENEM 1 G: 1 INJECTION, POWDER, FOR SOLUTION INTRAVENOUS at 09:09

## 2023-09-14 RX ADMIN — INSULIN ASPART 7 UNITS: 100 INJECTION, SOLUTION INTRAVENOUS; SUBCUTANEOUS at 05:09

## 2023-09-14 RX ADMIN — LINEZOLID 600 MG: 600 TABLET, FILM COATED ORAL at 09:09

## 2023-09-14 RX ADMIN — OXYCODONE HYDROCHLORIDE AND ACETAMINOPHEN 500 MG: 500 TABLET ORAL at 09:09

## 2023-09-14 RX ADMIN — INSULIN ASPART 4 UNITS: 100 INJECTION, SOLUTION INTRAVENOUS; SUBCUTANEOUS at 12:09

## 2023-09-14 RX ADMIN — CARVEDILOL 12.5 MG: 12.5 TABLET, FILM COATED ORAL at 07:09

## 2023-09-14 RX ADMIN — SODIUM CHLORIDE: 9 INJECTION, SOLUTION INTRAVENOUS at 04:09

## 2023-09-14 RX ADMIN — INSULIN DETEMIR 20 UNITS: 100 INJECTION, SOLUTION SUBCUTANEOUS at 09:09

## 2023-09-14 RX ADMIN — MULTIPLE VITAMINS W/ MINERALS TAB 1 TABLET: TAB at 09:09

## 2023-09-14 NOTE — PT/OT/SLP PROGRESS
Physical Therapy Treatment Note           Name: Antonio Galvan II    : 1965 (57 y.o.)  MRN: [id number]           TREATMENT SUMMARY AND RECOMMENDATIONS:    PT Received On: 23  PT Start Time: 1006     PT Stop Time: 1029  PT Total Time (min): 23 min     Subjective Assessment:   No complaints x Lethargic    Awake, alert, cooperative  Uncooperative    Agitated  c/o pain    Appropriate x c/o fatigue    Confused x Treated at bedside     Emotionally labile  Treated in gym/dept.    Impulsive  Other:    Flat affect       Therapy Precautions:    Cognitive deficits  Spinal precautions    Collar - hard  Sternal precautions    Collar - soft   TLSO    Fall risk  LSO    Hip precautions - posterior  Knee immobilizer    Hip precautions - anterior  WBAT    Impaired communication  Partial weightbearing    Oxygen  TTWB    PEG tube  NWB    Visual deficits x Other: perineural catheter, PICC line, pressure relief boots     Hearing deficits  x Quadriplegia        Treatment Objectives:     Mobility Training:   Assist level Comments    Bed mobility     Transfer     Gait     Sit to stand transitions     Sitting balance     Standing balance      Wheelchair mobility     Car transfer     Other:          Therapeutic Exercise:   Exercise Sets Reps Comments   PROM to B UE and LE, all joints and digits   All functional planes to tolerance with sustained hold at end ranges                         Additional Comments:  Pt with improved alertness today. Pt reports feeling better. PT progressing as able. Completing ROM to        Assessment: Patient tolerated session well.    PT Plan: continue per POC  Revisions made to plan of care: No    GOALS:   Multidisciplinary Problems       Physical Therapy Goals          Problem: Physical Therapy    Goal Priority Disciplines Outcome Goal Variances Interventions   Physical Therapy Goal     PT, PT/OT Ongoing, Progressing     Description: Goals to be met by: Discharge     Patient will  increase functional independence with mobility by performin.  Pt to receive PROM BLEs and UEs 5-6 d/wk, qd,  to prevent further contractures and ensure ability for positioning in WC, along with proper positioning to reduce further skin break down  2.  Pt to be assessed for appropriateness for out of bed to WC - awaiting wound care                       Skilled PT Minutes Provided: 23   Communication with Treatment Team:     Equipment recommendations:       At end of treatment, patient remained:   Up in chair     Up in wheelchair in room   x In bed    With alarm activated   x Bed rails up   x Call bell in reach    x Family/friends present    Restraints secured properly    In bathroom with CNA/RN notified    Nurse aware    In gym with therapist/tech    Other:
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
## 2023-09-14 NOTE — PLAN OF CARE
Problem: Adult Inpatient Plan of Care  Goal: Plan of Care Review  Outcome: Ongoing, Progressing  Flowsheets (Taken 9/14/2023 1058)  Plan of Care Reviewed With:   patient   caregiver  Goal: Patient-Specific Goal (Individualized)  Outcome: Ongoing, Progressing  Flowsheets (Taken 9/14/2023 1058)  Anxieties, Fears or Concerns: feeling better, anxious to get back home  Individualized Care Needs: IV and oral abt as scheduled, wound care, CBG's and insulin, vs monitor  Goal: Absence of Hospital-Acquired Illness or Injury  Outcome: Ongoing, Progressing  Intervention: Identify and Manage Fall Risk  Flowsheets (Taken 9/14/2023 1058)  Safety Promotion/Fall Prevention:   assistive device/personal item within reach   instructed to call staff for mobility   room near unit station   medications reviewed  Intervention: Prevent Skin Injury  Flowsheets (Taken 9/14/2023 1058)  Body Position:   turned   heels elevated  Skin Protection:   adhesive use limited   drying agents applied   incontinence pads utilized   tubing/devices free from skin contact  Intervention: Prevent and Manage VTE (Venous Thromboembolism) Risk  Flowsheets (Taken 9/14/2023 1058)  Activity Management: Rolling - L1  VTE Prevention/Management:   bleeding risk assessed   bleeding precations maintained  Range of Motion: ROM (range of motion) performed  Intervention: Prevent Infection  Flowsheets (Taken 9/14/2023 1058)  Infection Prevention:   personal protective equipment utilized   hand hygiene promoted   equipment surfaces disinfected   environmental surveillance performed   single patient room provided   rest/sleep promoted  Goal: Optimal Comfort and Wellbeing  Outcome: Ongoing, Progressing  Intervention: Monitor Pain and Promote Comfort  Flowsheets (Taken 9/14/2023 1058)  Pain Management Interventions:   care clustered   relaxation techniques promoted   quiet environment facilitated   position adjusted   pillow support provided   pain management plan reviewed  with patient/caregiver  Intervention: Provide Person-Centered Care  Flowsheets (Taken 9/14/2023 1054)  Trust Relationship/Rapport:   care explained   choices provided   reassurance provided   questions answered  Goal: Readiness for Transition of Care  Outcome: Ongoing, Progressing     Problem: Diabetes Comorbidity  Goal: Blood Glucose Level Within Targeted Range  Outcome: Ongoing, Progressing  Intervention: Monitor and Manage Glycemia  Flowsheets (Taken 9/14/2023 5704)  Glycemic Management:   blood glucose monitored   supplemental insulin given     Problem: Adjustment to Illness (Sepsis/Septic Shock)  Goal: Optimal Coping  Outcome: Ongoing, Progressing     Problem: Bleeding (Sepsis/Septic Shock)  Goal: Absence of Bleeding  Outcome: Ongoing, Progressing     Problem: Glycemic Control Impaired (Sepsis/Septic Shock)  Goal: Blood Glucose Level Within Desired Range  Outcome: Ongoing, Progressing     Problem: Infection Progression (Sepsis/Septic Shock)  Goal: Absence of Infection Signs and Symptoms  Outcome: Ongoing, Progressing     Problem: Nutrition Impaired (Sepsis/Septic Shock)  Goal: Optimal Nutrition Intake  Outcome: Ongoing, Progressing     Problem: Infection  Goal: Absence of Infection Signs and Symptoms  Outcome: Ongoing, Progressing     Problem: Impaired Wound Healing  Goal: Optimal Wound Healing  Outcome: Ongoing, Progressing     Problem: Skin Injury Risk Increased  Goal: Skin Health and Integrity  Outcome: Ongoing, Progressing     Problem: Fall Injury Risk  Goal: Absence of Fall and Fall-Related Injury  Outcome: Ongoing, Progressing

## 2023-09-15 LAB
POCT GLUCOSE: 130 MG/DL (ref 70–110)
POCT GLUCOSE: 165 MG/DL (ref 70–110)

## 2023-09-15 PROCEDURE — 97110 THERAPEUTIC EXERCISES: CPT

## 2023-09-15 PROCEDURE — 25000003 PHARM REV CODE 250: Performed by: INTERNAL MEDICINE

## 2023-09-15 PROCEDURE — 25000003 PHARM REV CODE 250: Performed by: STUDENT IN AN ORGANIZED HEALTH CARE EDUCATION/TRAINING PROGRAM

## 2023-09-15 PROCEDURE — 63600175 PHARM REV CODE 636 W HCPCS: Performed by: STUDENT IN AN ORGANIZED HEALTH CARE EDUCATION/TRAINING PROGRAM

## 2023-09-15 PROCEDURE — 94760 N-INVAS EAR/PLS OXIMETRY 1: CPT

## 2023-09-15 PROCEDURE — A4216 STERILE WATER/SALINE, 10 ML: HCPCS | Performed by: STUDENT IN AN ORGANIZED HEALTH CARE EDUCATION/TRAINING PROGRAM

## 2023-09-15 PROCEDURE — 11000004 HC SNF PRIVATE

## 2023-09-15 PROCEDURE — 27000207 HC ISOLATION

## 2023-09-15 RX ADMIN — MEROPENEM 1 G: 1 INJECTION, POWDER, FOR SOLUTION INTRAVENOUS at 05:09

## 2023-09-15 RX ADMIN — MULTIPLE VITAMINS W/ MINERALS TAB 1 TABLET: TAB at 09:09

## 2023-09-15 RX ADMIN — INSULIN ASPART 7 UNITS: 100 INJECTION, SOLUTION INTRAVENOUS; SUBCUTANEOUS at 07:09

## 2023-09-15 RX ADMIN — MEROPENEM 1 G: 1 INJECTION, POWDER, FOR SOLUTION INTRAVENOUS at 09:09

## 2023-09-15 RX ADMIN — Medication 10 ML: at 09:09

## 2023-09-15 RX ADMIN — CARVEDILOL 12.5 MG: 12.5 TABLET, FILM COATED ORAL at 05:09

## 2023-09-15 RX ADMIN — SODIUM CHLORIDE: 9 INJECTION, SOLUTION INTRAVENOUS at 09:09

## 2023-09-15 RX ADMIN — INSULIN DETEMIR 20 UNITS: 100 INJECTION, SOLUTION SUBCUTANEOUS at 09:09

## 2023-09-15 RX ADMIN — OXYCODONE HYDROCHLORIDE AND ACETAMINOPHEN 500 MG: 500 TABLET ORAL at 09:09

## 2023-09-15 RX ADMIN — INSULIN ASPART 7 UNITS: 100 INJECTION, SOLUTION INTRAVENOUS; SUBCUTANEOUS at 11:09

## 2023-09-15 RX ADMIN — LINEZOLID 600 MG: 600 TABLET, FILM COATED ORAL at 09:09

## 2023-09-15 RX ADMIN — ASPIRIN 81 MG: 81 TABLET, COATED ORAL at 09:09

## 2023-09-15 RX ADMIN — INSULIN ASPART 7 UNITS: 100 INJECTION, SOLUTION INTRAVENOUS; SUBCUTANEOUS at 05:09

## 2023-09-15 RX ADMIN — MEROPENEM 1 G: 1 INJECTION, POWDER, FOR SOLUTION INTRAVENOUS at 01:09

## 2023-09-15 RX ADMIN — CARVEDILOL 12.5 MG: 12.5 TABLET, FILM COATED ORAL at 09:09

## 2023-09-15 RX ADMIN — SITAGLIPTIN 100 MG: 50 TABLET, FILM COATED ORAL at 09:09

## 2023-09-15 RX ADMIN — LIDOCAINE HYDROCHLORIDE 10 ML: 20 JELLY TOPICAL at 05:09

## 2023-09-15 RX ADMIN — FERROUS SULFATE TAB 325 MG (65 MG ELEMENTAL FE) 1 EACH: 325 (65 FE) TAB at 09:09

## 2023-09-15 RX ADMIN — SODIUM CHLORIDE: 9 INJECTION, SOLUTION INTRAVENOUS at 01:09

## 2023-09-15 NOTE — PLAN OF CARE
Problem: Adult Inpatient Plan of Care  Goal: Plan of Care Review  9/14/2023 2242 by Jihan Moura RN  Outcome: Ongoing, Progressing  Flowsheets (Taken 9/14/2023 2000)  Plan of Care Reviewed With:   patient   mother  9/14/2023 2242 by Jihan Moura RN  Outcome: Ongoing, Progressing  Goal: Patient-Specific Goal (Individualized)  9/14/2023 2242 by Jihan Moura RN  Outcome: Ongoing, Progressing  Flowsheets (Taken 9/14/2023 2000)  Anxieties, Fears or Concerns: ready to get back home  Individualized Care Needs: IV abx  9/14/2023 2242 by Jihan Moura RN  Outcome: Ongoing, Progressing     Problem: Infection  Goal: Absence of Infection Signs and Symptoms  9/14/2023 2242 by Jihan Moura RN  Outcome: Ongoing, Progressing  9/14/2023 2242 by Jihan Moura RN  Outcome: Ongoing, Progressing  Intervention: Prevent or Manage Infection  Flowsheets (Taken 9/14/2023 2000)  Fever Reduction/Comfort Measures:   lightweight bedding   lightweight clothing  Infection Management: aseptic technique maintained  Isolation Precautions: protective     Problem: Impaired Wound Healing  Goal: Optimal Wound Healing  9/14/2023 2242 by Jihan Moura RN  Outcome: Ongoing, Progressing  9/14/2023 2242 by Jihan Moura RN  Outcome: Ongoing, Progressing  Intervention: Promote Wound Healing  Flowsheets (Taken 9/14/2023 2242)  Oral Nutrition Promotion:   physical activity promoted   rest periods promoted   safe use of adaptive equipment encouraged  Activity Management: Rolling - L1  Pain Management Interventions:   care clustered   pillow support provided   position adjusted     Problem: Skin Injury Risk Increased  Goal: Skin Health and Integrity  Outcome: Ongoing, Progressing
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
## 2023-09-15 NOTE — PLAN OF CARE
Problem: Adult Inpatient Plan of Care  Goal: Plan of Care Review  Outcome: Ongoing, Progressing  Flowsheets (Taken 9/15/2023 1503)  Plan of Care Reviewed With:   patient   caregiver  Goal: Patient-Specific Goal (Individualized)  Outcome: Ongoing, Progressing  Flowsheets (Taken 9/15/2023 1503)  Anxieties, Fears or Concerns: none  Individualized Care Needs: Wound care and continue Abx treatment, turn q 2 hours  Goal: Optimal Comfort and Wellbeing  Outcome: Ongoing, Progressing  Intervention: Monitor Pain and Promote Comfort  Flowsheets (Taken 9/15/2023 1503)  Pain Management Interventions:   position adjusted   quiet environment facilitated   diversional activity provided   care clustered  Intervention: Provide Person-Centered Care  Flowsheets (Taken 9/15/2023 1503)  Trust Relationship/Rapport:   care explained   emotional support provided   empathic listening provided   thoughts/feelings acknowledged   reassurance provided   questions answered     Problem: Nutrition Impaired (Sepsis/Septic Shock)  Goal: Optimal Nutrition Intake  Outcome: Ongoing, Progressing  Intervention: Promote and Optimize Nutrition Delivery  Flowsheets (Taken 9/15/2023 1503)  Nutrition Support Management: (nutrional supplements given) other (see comments)     Problem: Infection  Goal: Absence of Infection Signs and Symptoms  Outcome: Ongoing, Progressing  Intervention: Prevent or Manage Infection  Flowsheets (Taken 9/15/2023 1503)  Fever Reduction/Comfort Measures:   fluid intake increased   lightweight bedding  Infection Management: aseptic technique maintained  Isolation Precautions: precautions maintained     Problem: Impaired Wound Healing  Goal: Optimal Wound Healing  Outcome: Ongoing, Progressing  Intervention: Promote Wound Healing  Flowsheets (Taken 9/15/2023 1503)  Oral Nutrition Promotion: calorie-dense foods provided  Sleep/Rest Enhancement:   awakenings minimized   regular sleep/rest pattern promoted  Activity Management: Rolling -  L1  Pain Management Interventions:   position adjusted   quiet environment facilitated   diversional activity provided   care clustered     Problem: Skin Injury Risk Increased  Goal: Skin Health and Integrity  Outcome: Ongoing, Progressing     Problem: Fall Injury Risk  Goal: Absence of Fall and Fall-Related Injury  Outcome: Ongoing, Progressing

## 2023-09-15 NOTE — PT/OT/SLP PROGRESS
Physical Therapy Treatment Note           Name: Antonio Galvan II    : 1965 (57 y.o.)  MRN: [id number]           TREATMENT SUMMARY AND RECOMMENDATIONS:    PT Received On: 09/15/23  PT Start Time: 1105     PT Stop Time: 1135  PT Total Time (min): 30 min     Subjective Assessment:   No complaints  Lethargic   x Awake, alert, cooperative  Uncooperative    Agitated  c/o pain    Appropriate  c/o fatigue    Confused x Treated at bedside     Emotionally labile  Treated in gym/dept.    Impulsive  Other:    Flat affect       Therapy Precautions:    Cognitive deficits  Spinal precautions    Collar - hard  Sternal precautions    Collar - soft   TLSO    Fall risk  LSO    Hip precautions - posterior  Knee immobilizer    Hip precautions - anterior  WBAT    Impaired communication  Partial weightbearing    Oxygen  TTWB    PEG tube  NWB    Visual deficits x Other: perineural catheter, PICC line, pressure relief boots     Hearing deficits  x Quadriplegia        Treatment Objectives:     Mobility Training:   Assist level Comments    Bed mobility     Transfer     Gait     Sit to stand transitions     Sitting balance     Standing balance      Wheelchair mobility     Car transfer     Other:          Therapeutic Exercise:   Exercise Sets Reps Comments   PROM to B UE and LE, all joints and digits   All functional planes to tolerance with sustained hold at end ranges                         Additional Comments:  Pt with improved alertness today. Pt reports feeling better. PT progressing as able.     Still awaiting wound care clearance to allow OOB or EOB mobility. Reports are bleeding at wound has improved, but wound still at risk for break down, so being cautions with OOB mobility. Will continue to follow up.     Pt on IV antibiotics and confided to bed due to wound at this time - not pt's normal   Assessment: Patient tolerated session well.    PT Plan: continue per POC  Revisions made to plan of care: No    GOALS:    Multidisciplinary Problems       Physical Therapy Goals          Problem: Physical Therapy    Goal Priority Disciplines Outcome Goal Variances Interventions   Physical Therapy Goal     PT, PT/OT Ongoing, Progressing     Description: Goals to be met by: Discharge     Patient will increase functional independence with mobility by performin.  Pt to receive PROM BLEs and UEs 5-6 d/wk, qd,  to prevent further contractures and ensure ability for positioning in WC, along with proper positioning to reduce further skin break down  2.  Pt to be assessed for appropriateness for out of bed to WC - awaiting wound care                       Skilled PT Minutes Provided: 23   Communication with Treatment Team:     Equipment recommendations:       At end of treatment, patient remained:   Up in chair     Up in wheelchair in room   x In bed    With alarm activated   x Bed rails up   x Call bell in reach    x Family/friends present    Restraints secured properly    In bathroom with CNA/RN notified   x Nurse aware    In gym with therapist/tech    Other:

## 2023-09-16 LAB
ANION GAP SERPL CALC-SCNC: 9 MEQ/L
BASOPHILS # BLD AUTO: 0.02 X10(3)/MCL
BASOPHILS NFR BLD AUTO: 0.4 %
BUN SERPL-MCNC: 27.9 MG/DL (ref 8.4–25.7)
CALCIUM SERPL-MCNC: 8.4 MG/DL (ref 8.4–10.2)
CHLORIDE SERPL-SCNC: 110 MMOL/L (ref 98–107)
CO2 SERPL-SCNC: 22 MMOL/L (ref 22–29)
CREAT SERPL-MCNC: 0.87 MG/DL (ref 0.73–1.18)
CREAT/UREA NIT SERPL: 32
EOSINOPHIL # BLD AUTO: 0.38 X10(3)/MCL (ref 0–0.9)
EOSINOPHIL NFR BLD AUTO: 7.6 %
ERYTHROCYTE [DISTWIDTH] IN BLOOD BY AUTOMATED COUNT: 18.5 % (ref 11.5–17)
GFR SERPLBLD CREATININE-BSD FMLA CKD-EPI: >60 MLS/MIN/1.73/M2
GLUCOSE SERPL-MCNC: 156 MG/DL (ref 74–100)
HCT VFR BLD AUTO: 33.2 % (ref 42–52)
HGB BLD-MCNC: 9.7 G/DL (ref 14–18)
IMM GRANULOCYTES # BLD AUTO: 0.01 X10(3)/MCL (ref 0–0.04)
IMM GRANULOCYTES NFR BLD AUTO: 0.2 %
LYMPHOCYTES # BLD AUTO: 2.08 X10(3)/MCL (ref 0.6–4.6)
LYMPHOCYTES NFR BLD AUTO: 41.7 %
MAGNESIUM SERPL-MCNC: 1.7 MG/DL (ref 1.6–2.6)
MCH RBC QN AUTO: 26.8 PG (ref 27–31)
MCHC RBC AUTO-ENTMCNC: 29.2 G/DL (ref 33–36)
MCV RBC AUTO: 91.7 FL (ref 80–94)
MONOCYTES # BLD AUTO: 0.33 X10(3)/MCL (ref 0.1–1.3)
MONOCYTES NFR BLD AUTO: 6.6 %
NEUTROPHILS # BLD AUTO: 2.17 X10(3)/MCL (ref 2.1–9.2)
NEUTROPHILS NFR BLD AUTO: 43.5 %
PLATELET # BLD AUTO: 195 X10(3)/MCL (ref 130–400)
PMV BLD AUTO: 10.4 FL (ref 7.4–10.4)
POCT GLUCOSE: 107 MG/DL (ref 70–110)
POCT GLUCOSE: 142 MG/DL (ref 70–110)
POCT GLUCOSE: 173 MG/DL (ref 70–110)
POCT GLUCOSE: 180 MG/DL (ref 70–110)
POTASSIUM SERPL-SCNC: 4.1 MMOL/L (ref 3.5–5.1)
RBC # BLD AUTO: 3.62 X10(6)/MCL (ref 4.7–6.1)
SODIUM SERPL-SCNC: 141 MMOL/L (ref 136–145)
WBC # SPEC AUTO: 4.99 X10(3)/MCL (ref 4.5–11.5)

## 2023-09-16 PROCEDURE — 80048 BASIC METABOLIC PNL TOTAL CA: CPT | Performed by: FAMILY MEDICINE

## 2023-09-16 PROCEDURE — 27000207 HC ISOLATION

## 2023-09-16 PROCEDURE — 25000003 PHARM REV CODE 250: Performed by: STUDENT IN AN ORGANIZED HEALTH CARE EDUCATION/TRAINING PROGRAM

## 2023-09-16 PROCEDURE — A4216 STERILE WATER/SALINE, 10 ML: HCPCS | Performed by: STUDENT IN AN ORGANIZED HEALTH CARE EDUCATION/TRAINING PROGRAM

## 2023-09-16 PROCEDURE — 94760 N-INVAS EAR/PLS OXIMETRY 1: CPT

## 2023-09-16 PROCEDURE — 83735 ASSAY OF MAGNESIUM: CPT | Performed by: FAMILY MEDICINE

## 2023-09-16 PROCEDURE — 25000003 PHARM REV CODE 250: Performed by: INTERNAL MEDICINE

## 2023-09-16 PROCEDURE — 63600175 PHARM REV CODE 636 W HCPCS: Performed by: STUDENT IN AN ORGANIZED HEALTH CARE EDUCATION/TRAINING PROGRAM

## 2023-09-16 PROCEDURE — 85025 COMPLETE CBC W/AUTO DIFF WBC: CPT | Performed by: FAMILY MEDICINE

## 2023-09-16 PROCEDURE — 11000004 HC SNF PRIVATE

## 2023-09-16 RX ADMIN — LINEZOLID 600 MG: 600 TABLET, FILM COATED ORAL at 09:09

## 2023-09-16 RX ADMIN — MULTIPLE VITAMINS W/ MINERALS TAB 1 TABLET: TAB at 09:09

## 2023-09-16 RX ADMIN — OXYCODONE HYDROCHLORIDE AND ACETAMINOPHEN 500 MG: 500 TABLET ORAL at 09:09

## 2023-09-16 RX ADMIN — ASPIRIN 81 MG: 81 TABLET, COATED ORAL at 09:09

## 2023-09-16 RX ADMIN — INSULIN ASPART 7 UNITS: 100 INJECTION, SOLUTION INTRAVENOUS; SUBCUTANEOUS at 06:09

## 2023-09-16 RX ADMIN — CARVEDILOL 12.5 MG: 12.5 TABLET, FILM COATED ORAL at 05:09

## 2023-09-16 RX ADMIN — MEROPENEM 1 G: 1 INJECTION, POWDER, FOR SOLUTION INTRAVENOUS at 12:09

## 2023-09-16 RX ADMIN — MEROPENEM 1 G: 1 INJECTION, POWDER, FOR SOLUTION INTRAVENOUS at 05:09

## 2023-09-16 RX ADMIN — INSULIN ASPART 7 UNITS: 100 INJECTION, SOLUTION INTRAVENOUS; SUBCUTANEOUS at 12:09

## 2023-09-16 RX ADMIN — Medication 10 ML: at 09:09

## 2023-09-16 RX ADMIN — Medication 10 ML: at 11:09

## 2023-09-16 RX ADMIN — INSULIN ASPART 7 UNITS: 100 INJECTION, SOLUTION INTRAVENOUS; SUBCUTANEOUS at 09:09

## 2023-09-16 RX ADMIN — SITAGLIPTIN 100 MG: 50 TABLET, FILM COATED ORAL at 09:09

## 2023-09-16 RX ADMIN — INSULIN ASPART 1 UNITS: 100 INJECTION, SOLUTION INTRAVENOUS; SUBCUTANEOUS at 09:09

## 2023-09-16 RX ADMIN — MEROPENEM 1 G: 1 INJECTION, POWDER, FOR SOLUTION INTRAVENOUS at 09:09

## 2023-09-16 RX ADMIN — SODIUM CHLORIDE: 9 INJECTION, SOLUTION INTRAVENOUS at 09:09

## 2023-09-16 RX ADMIN — SODIUM CHLORIDE: 9 INJECTION, SOLUTION INTRAVENOUS at 12:09

## 2023-09-16 RX ADMIN — CARVEDILOL 12.5 MG: 12.5 TABLET, FILM COATED ORAL at 09:09

## 2023-09-16 RX ADMIN — FERROUS SULFATE TAB 325 MG (65 MG ELEMENTAL FE) 1 EACH: 325 (65 FE) TAB at 09:09

## 2023-09-16 RX ADMIN — INSULIN DETEMIR 20 UNITS: 100 INJECTION, SOLUTION SUBCUTANEOUS at 09:09

## 2023-09-16 NOTE — PLAN OF CARE
Problem: Adult Inpatient Plan of Care  Goal: Patient-Specific Goal (Individualized)  Flowsheets (Taken 9/15/2023 2045)  Anxieties, Fears or Concerns: pt voiced no concerns today.  Individualized Care Needs: Continue IV abx therapy and daily wound care as ordered.     Problem: Diabetes Comorbidity  Goal: Blood Glucose Level Within Targeted Range  Intervention: Monitor and Manage Glycemia  Flowsheets (Taken 9/15/2023 2045)  Glycemic Management:   blood glucose monitored   supplemental insulin given   oral hydration promoted     Problem: Glycemic Control Impaired (Sepsis/Septic Shock)  Goal: Blood Glucose Level Within Desired Range  Intervention: Optimize Glycemic Control  Flowsheets (Taken 9/15/2023 2045)  Glycemic Management:   blood glucose monitored   supplemental insulin given   oral hydration promoted     Problem: Impaired Wound Healing  Goal: Optimal Wound Healing  Intervention: Promote Wound Healing  Flowsheets (Taken 9/15/2023 2045)  Oral Nutrition Promotion: (Pt prefers to have family bring in outside food.)   calorie-dense foods provided   calorie-dense liquids provided   other (see comments)  Sleep/Rest Enhancement:   awakenings minimized   noise level reduced   room darkened  Activity Management:   Rolling - L1   Patient unable to perform activities  Pain Management Interventions:   position adjusted   quiet environment facilitated   diversional activity provided     Problem: Skin Injury Risk Increased  Goal: Skin Health and Integrity  Intervention: Optimize Skin Protection  Flowsheets (Taken 9/15/2023 2045)  Pressure Reduction Techniques:   frequent weight shift encouraged   weight shift assistance provided  Pressure Reduction Devices:   positioning supports utilized   pressure-redistributing mattress utilized  Skin Protection:   adhesive use limited   incontinence pads utilized   skin sealant/moisture barrier applied  Head of Bed (HOB) Positioning: HOB elevated

## 2023-09-16 NOTE — PLAN OF CARE
Problem: Adult Inpatient Plan of Care  Goal: Plan of Care Review  Outcome: Ongoing, Progressing  Flowsheets (Taken 9/16/2023 1047)  Plan of Care Reviewed With:   patient   caregiver  Goal: Patient-Specific Goal (Individualized)  Outcome: Ongoing, Progressing  Flowsheets (Taken 9/16/2023 1047)  Anxieties, Fears or Concerns: no concerns  Individualized Care Needs: IV antibiotics, wound care, maintain skin integrity, turn Q2  Goal: Absence of Hospital-Acquired Illness or Injury  Outcome: Ongoing, Progressing  Intervention: Identify and Manage Fall Risk  Flowsheets (Taken 9/16/2023 1047)  Safety Promotion/Fall Prevention:   assistive device/personal item within reach   bed alarm set   family to remain at bedside   nonskid shoes/socks when out of bed   room near unit station   instructed to call staff for mobility  Intervention: Prevent Skin Injury  Flowsheets (Taken 9/16/2023 1047)  Body Position: sitting up in bed  Skin Protection:   adhesive use limited   incontinence pads utilized   tubing/devices free from skin contact   transparent dressing maintained  Intervention: Prevent and Manage VTE (Venous Thromboembolism) Risk  Flowsheets (Taken 9/16/2023 1047)  Activity Management: Rolling - L1  VTE Prevention/Management:   bleeding precations maintained   bleeding risk assessed   fluids promoted  Range of Motion: active ROM (range of motion) encouraged  Intervention: Prevent Infection  Flowsheets (Taken 9/16/2023 1047)  Infection Prevention:   equipment surfaces disinfected   hand hygiene promoted   personal protective equipment utilized  Goal: Optimal Comfort and Wellbeing  Outcome: Ongoing, Progressing  Intervention: Monitor Pain and Promote Comfort  Flowsheets (Taken 9/16/2023 1047)  Pain Management Interventions:   care clustered   quiet environment facilitated   relaxation techniques promoted   pain management plan reviewed with patient/caregiver  Intervention: Provide Person-Centered Care  Flowsheets (Taken  9/16/2023 1047)  Trust Relationship/Rapport:   care explained   choices provided   questions encouraged   questions answered  Goal: Readiness for Transition of Care  Outcome: Ongoing, Progressing  Intervention: Mutually Develop Transition Plan  Flowsheets (Taken 9/16/2023 1047)  Communicated SADE with patient/caregiver: Date not available/Unable to determine     Problem: Diabetes Comorbidity  Goal: Blood Glucose Level Within Targeted Range  Outcome: Ongoing, Progressing  Intervention: Monitor and Manage Glycemia  Flowsheets (Taken 9/16/2023 1047)  Glycemic Management:   blood glucose monitored   supplemental insulin given   oral hydration promoted     Problem: Adjustment to Illness (Sepsis/Septic Shock)  Goal: Optimal Coping  Outcome: Ongoing, Progressing  Intervention: Optimize Psychosocial Adjustment to Illness  Flowsheets (Taken 9/16/2023 1047)  Supportive Measures:   active listening utilized   verbalization of feelings encouraged   self-care encouraged  Family/Support System Care:   self-care encouraged   involvement promoted   presence promoted     Problem: Bleeding (Sepsis/Septic Shock)  Goal: Absence of Bleeding  Outcome: Ongoing, Progressing  Intervention: Monitor and Manage Bleeding  Flowsheets (Taken 9/16/2023 1047)  Bleeding Precautions:   blood pressure closely monitored   monitored for signs of bleeding     Problem: Glycemic Control Impaired (Sepsis/Septic Shock)  Goal: Blood Glucose Level Within Desired Range  Outcome: Ongoing, Progressing  Intervention: Optimize Glycemic Control  Flowsheets (Taken 9/16/2023 1047)  Glycemic Management:   blood glucose monitored   supplemental insulin given   oral hydration promoted     Problem: Infection Progression (Sepsis/Septic Shock)  Goal: Absence of Infection Signs and Symptoms  Outcome: Ongoing, Progressing  Intervention: Initiate Sepsis Management  Flowsheets (Taken 9/16/2023 1047)  Infection Prevention:   equipment surfaces disinfected   hand hygiene  promoted   personal protective equipment utilized  Infection Management: aseptic technique maintained  Isolation Precautions:   contact   precautions maintained  Intervention: Promote Stabilization  Flowsheets (Taken 9/16/2023 1047)  Fever Reduction/Comfort Measures:   fluid intake increased   lightweight clothing   lightweight bedding  Fluid/Electrolyte Management: fluids provided  Intervention: Promote Recovery  Flowsheets (Taken 9/16/2023 1047)  Sleep/Rest Enhancement:   awakenings minimized   relaxation techniques promoted   regular sleep/rest pattern promoted  Activity Management: Rolling - L1     Problem: Nutrition Impaired (Sepsis/Septic Shock)  Goal: Optimal Nutrition Intake  Outcome: Ongoing, Progressing     Problem: Infection  Goal: Absence of Infection Signs and Symptoms  Outcome: Ongoing, Progressing  Intervention: Prevent or Manage Infection  Flowsheets (Taken 9/16/2023 1047)  Fever Reduction/Comfort Measures:   fluid intake increased   lightweight clothing   lightweight bedding  Infection Management: aseptic technique maintained  Isolation Precautions:   contact   precautions maintained     Problem: Impaired Wound Healing  Goal: Optimal Wound Healing  Outcome: Ongoing, Progressing  Intervention: Promote Wound Healing  Flowsheets (Taken 9/16/2023 1047)  Oral Nutrition Promotion:   calorie-dense foods provided   calorie-dense liquids provided   physical activity promoted   safe use of adaptive equipment encouraged  Sleep/Rest Enhancement:   awakenings minimized   relaxation techniques promoted   regular sleep/rest pattern promoted  Activity Management: Rolling - L1  Pain Management Interventions:   care clustered   quiet environment facilitated   relaxation techniques promoted   pain management plan reviewed with patient/caregiver     Problem: Skin Injury Risk Increased  Goal: Skin Health and Integrity  Outcome: Ongoing, Progressing  Intervention: Optimize Skin Protection  Flowsheets (Taken 9/16/2023  1047)  Pressure Reduction Techniques:   frequent weight shift encouraged   weight shift assistance provided   heels elevated off bed   pressure points protected  Pressure Reduction Devices: positioning supports utilized  Skin Protection:   adhesive use limited   incontinence pads utilized   tubing/devices free from skin contact   transparent dressing maintained  Head of Bed (HOB) Positioning: HOB at 20-30 degrees  Intervention: Promote and Optimize Oral Intake  Flowsheets (Taken 9/16/2023 1047)  Oral Nutrition Promotion:   calorie-dense foods provided   calorie-dense liquids provided   physical activity promoted   safe use of adaptive equipment encouraged     Problem: Fall Injury Risk  Goal: Absence of Fall and Fall-Related Injury  Outcome: Ongoing, Progressing  Intervention: Identify and Manage Contributors  Flowsheets (Taken 9/16/2023 1047)  Self-Care Promotion:   independence encouraged   BADL personal objects within reach  Medication Review/Management: medications reviewed  Intervention: Promote Injury-Free Environment  Flowsheets (Taken 9/16/2023 1047)  Safety Promotion/Fall Prevention:   assistive device/personal item within reach   bed alarm set   family to remain at bedside   nonskid shoes/socks when out of bed   room near unit station   instructed to call staff for mobility
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
## 2023-09-16 NOTE — PLAN OF CARE
[facility]  Discharge Reassessment    Primary Care Provider: Jennifer Fernando NP    Expected Discharge Date:     Reassessment (most recent)       Discharge Reassessment - 09/16/23 0716          Discharge Reassessment    Assessment Type Discharge Planning Reassessment     Did the patient's condition or plan change since previous assessment? No     Discharge Plan discussed with: Parent(s)     Name(s) and Number(s) Judy mother      Communicated SADE with patient/caregiver Date not available/Unable to determine     Discharge Plan A Home with family;Home Health     DME Needed Upon Discharge  none     Transition of Care Barriers None     Why the patient remains in the hospital Requires continued medical care        Post-Acute Status    Post-Acute Authorization Home Health     Home Health Status Pending medical clearance/testing     Hospital Resources/Appts/Education Provided Provided patient/caregiver with written discharge plan information     Discharge Delays None known at this time

## 2023-09-17 LAB
POCT GLUCOSE: 133 MG/DL (ref 70–110)
POCT GLUCOSE: 155 MG/DL (ref 70–110)
POCT GLUCOSE: 180 MG/DL (ref 70–110)
POCT GLUCOSE: 93 MG/DL (ref 70–110)

## 2023-09-17 PROCEDURE — 94760 N-INVAS EAR/PLS OXIMETRY 1: CPT

## 2023-09-17 PROCEDURE — 25000003 PHARM REV CODE 250: Performed by: STUDENT IN AN ORGANIZED HEALTH CARE EDUCATION/TRAINING PROGRAM

## 2023-09-17 PROCEDURE — 11000004 HC SNF PRIVATE

## 2023-09-17 PROCEDURE — A4216 STERILE WATER/SALINE, 10 ML: HCPCS | Performed by: STUDENT IN AN ORGANIZED HEALTH CARE EDUCATION/TRAINING PROGRAM

## 2023-09-17 PROCEDURE — 27000207 HC ISOLATION

## 2023-09-17 PROCEDURE — 63600175 PHARM REV CODE 636 W HCPCS: Performed by: STUDENT IN AN ORGANIZED HEALTH CARE EDUCATION/TRAINING PROGRAM

## 2023-09-17 PROCEDURE — 25000003 PHARM REV CODE 250: Performed by: INTERNAL MEDICINE

## 2023-09-17 PROCEDURE — 99900035 HC TECH TIME PER 15 MIN (STAT)

## 2023-09-17 RX ADMIN — SITAGLIPTIN 100 MG: 50 TABLET, FILM COATED ORAL at 09:09

## 2023-09-17 RX ADMIN — SODIUM CHLORIDE: 9 INJECTION, SOLUTION INTRAVENOUS at 12:09

## 2023-09-17 RX ADMIN — LINEZOLID 600 MG: 600 TABLET, FILM COATED ORAL at 10:09

## 2023-09-17 RX ADMIN — MEROPENEM 1 G: 1 INJECTION, POWDER, FOR SOLUTION INTRAVENOUS at 05:09

## 2023-09-17 RX ADMIN — MULTIPLE VITAMINS W/ MINERALS TAB 1 TABLET: TAB at 09:09

## 2023-09-17 RX ADMIN — Medication 10 ML: at 05:09

## 2023-09-17 RX ADMIN — INSULIN DETEMIR 20 UNITS: 100 INJECTION, SOLUTION SUBCUTANEOUS at 10:09

## 2023-09-17 RX ADMIN — LINEZOLID 600 MG: 600 TABLET, FILM COATED ORAL at 09:09

## 2023-09-17 RX ADMIN — FERROUS SULFATE TAB 325 MG (65 MG ELEMENTAL FE) 1 EACH: 325 (65 FE) TAB at 09:09

## 2023-09-17 RX ADMIN — INSULIN ASPART 7 UNITS: 100 INJECTION, SOLUTION INTRAVENOUS; SUBCUTANEOUS at 12:09

## 2023-09-17 RX ADMIN — MEROPENEM 1 G: 1 INJECTION, POWDER, FOR SOLUTION INTRAVENOUS at 10:09

## 2023-09-17 RX ADMIN — HYDROCODONE BITARTRATE AND ACETAMINOPHEN 1 TABLET: 10; 325 TABLET ORAL at 02:09

## 2023-09-17 RX ADMIN — SODIUM CHLORIDE 5 ML/HR: 9 INJECTION, SOLUTION INTRAVENOUS at 10:09

## 2023-09-17 RX ADMIN — CARVEDILOL 12.5 MG: 12.5 TABLET, FILM COATED ORAL at 04:09

## 2023-09-17 RX ADMIN — ASPIRIN 81 MG: 81 TABLET, COATED ORAL at 09:09

## 2023-09-17 RX ADMIN — INSULIN ASPART 7 UNITS: 100 INJECTION, SOLUTION INTRAVENOUS; SUBCUTANEOUS at 04:09

## 2023-09-17 RX ADMIN — MEROPENEM 1 G: 1 INJECTION, POWDER, FOR SOLUTION INTRAVENOUS at 12:09

## 2023-09-17 RX ADMIN — CARVEDILOL 12.5 MG: 12.5 TABLET, FILM COATED ORAL at 09:09

## 2023-09-17 RX ADMIN — OXYCODONE HYDROCHLORIDE AND ACETAMINOPHEN 500 MG: 500 TABLET ORAL at 09:09

## 2023-09-17 RX ADMIN — SODIUM CHLORIDE: 9 INJECTION, SOLUTION INTRAVENOUS at 05:09

## 2023-09-17 RX ADMIN — HYDROCODONE BITARTRATE AND ACETAMINOPHEN 1 TABLET: 10; 325 TABLET ORAL at 04:09

## 2023-09-17 NOTE — PLAN OF CARE
Problem: Adult Inpatient Plan of Care  Goal: Plan of Care Review  Outcome: Ongoing, Progressing  Flowsheets (Taken 9/17/2023 1404)  Plan of Care Reviewed With: patient  Goal: Patient-Specific Goal (Individualized)  Outcome: Ongoing, Progressing  Flowsheets (Taken 9/17/2023 1404)  Anxieties, Fears or Concerns: None expressed  Individualized Care Needs: Antibiotic therapy, Wound care, Monitor blood glucose levels  Goal: Absence of Hospital-Acquired Illness or Injury  Outcome: Ongoing, Progressing  Intervention: Identify and Manage Fall Risk  Flowsheets (Taken 9/17/2023 1404)  Safety Promotion/Fall Prevention:   assistive device/personal item within reach   bed alarm set   nonskid shoes/socks when out of bed   side rails raised x 3  Intervention: Prevent Skin Injury  Flowsheets (Taken 9/17/2023 1404)  Body Position:   turned   right  Skin Protection:   adhesive use limited   incontinence pads utilized   tubing/devices free from skin contact  Intervention: Prevent and Manage VTE (Venous Thromboembolism) Risk  Flowsheets (Taken 9/17/2023 1404)  Activity Management: Patient unable to perform activities  VTE Prevention/Management:   bleeding precations maintained   bleeding risk assessed   fluids promoted  Range of Motion: ROM (range of motion) performed  Intervention: Prevent Infection  Flowsheets (Taken 9/17/2023 1404)  Infection Prevention:   equipment surfaces disinfected   hand hygiene promoted   rest/sleep promoted  Goal: Optimal Comfort and Wellbeing  Outcome: Ongoing, Progressing  Intervention: Monitor Pain and Promote Comfort  Flowsheets (Taken 9/17/2023 1404)  Pain Management Interventions:   care clustered   pain management plan reviewed with patient/caregiver   pillow support provided   position adjusted   quiet environment facilitated  Intervention: Provide Person-Centered Care  Flowsheets (Taken 9/17/2023 1404)  Trust Relationship/Rapport:   care explained   choices provided   emotional support provided    empathic listening provided   questions answered   questions encouraged  Goal: Readiness for Transition of Care  Outcome: Ongoing, Progressing  Intervention: Mutually Develop Transition Plan  Flowsheets (Taken 9/17/2023 1404)  Communicated SADE with patient/caregiver: Date not available/Unable to determine     Problem: Diabetes Comorbidity  Goal: Blood Glucose Level Within Targeted Range  Outcome: Ongoing, Progressing  Intervention: Monitor and Manage Glycemia  Flowsheets (Taken 9/17/2023 1404)  Glycemic Management:   blood glucose monitored   supplemental insulin given     Problem: Adjustment to Illness (Sepsis/Septic Shock)  Goal: Optimal Coping  Outcome: Ongoing, Progressing  Intervention: Optimize Psychosocial Adjustment to Illness  Flowsheets (Taken 9/17/2023 1404)  Supportive Measures:   active listening utilized   verbalization of feelings encouraged  Family/Support System Care: self-care encouraged     Problem: Bleeding (Sepsis/Septic Shock)  Goal: Absence of Bleeding  Outcome: Ongoing, Progressing  Intervention: Monitor and Manage Bleeding  Flowsheets (Taken 9/17/2023 1404)  Bleeding Precautions:   blood pressure closely monitored   monitored for signs of bleeding  Bleeding Management: dressing monitored     Problem: Glycemic Control Impaired (Sepsis/Septic Shock)  Goal: Blood Glucose Level Within Desired Range  Outcome: Ongoing, Progressing  Intervention: Optimize Glycemic Control  Flowsheets (Taken 9/17/2023 1404)  Glycemic Management:   blood glucose monitored   supplemental insulin given     Problem: Infection Progression (Sepsis/Septic Shock)  Goal: Absence of Infection Signs and Symptoms  Outcome: Ongoing, Progressing  Intervention: Initiate Sepsis Management  Flowsheets (Taken 9/17/2023 1404)  Infection Prevention:   equipment surfaces disinfected   hand hygiene promoted   rest/sleep promoted  Infection Management: aseptic technique maintained  Stabilization Measures: legs elevated  Isolation  Precautions:   precautions maintained   contact  Intervention: Promote Stabilization  Flowsheets (Taken 9/17/2023 1404)  Fever Reduction/Comfort Measures:   lightweight clothing   lightweight bedding  Fluid/Electrolyte Management: fluids provided  Intervention: Promote Recovery  Flowsheets (Taken 9/17/2023 1404)  Sleep/Rest Enhancement: regular sleep/rest pattern promoted  Activity Management: Patient unable to perform activities     Problem: Nutrition Impaired (Sepsis/Septic Shock)  Goal: Optimal Nutrition Intake  Outcome: Ongoing, Progressing     Problem: Infection  Goal: Absence of Infection Signs and Symptoms  Outcome: Ongoing, Progressing  Intervention: Prevent or Manage Infection  Flowsheets (Taken 9/17/2023 1404)  Fever Reduction/Comfort Measures:   lightweight clothing   lightweight bedding  Infection Management: aseptic technique maintained  Isolation Precautions:   precautions maintained   contact     Problem: Impaired Wound Healing  Goal: Optimal Wound Healing  Outcome: Ongoing, Progressing  Intervention: Promote Wound Healing  Flowsheets (Taken 9/17/2023 1404)  Oral Nutrition Promotion:   calorie-dense foods provided   calorie-dense liquids provided   safe use of adaptive equipment encouraged  Sleep/Rest Enhancement: regular sleep/rest pattern promoted  Activity Management: Patient unable to perform activities  Pain Management Interventions:   care clustered   pain management plan reviewed with patient/caregiver   pillow support provided   position adjusted   quiet environment facilitated     Problem: Skin Injury Risk Increased  Goal: Skin Health and Integrity  Outcome: Ongoing, Progressing  Intervention: Optimize Skin Protection  Flowsheets (Taken 9/17/2023 1404)  Pressure Reduction Techniques:   weight shift assistance provided   positioned off wounds  Pressure Reduction Devices: positioning supports utilized  Skin Protection:   adhesive use limited   incontinence pads utilized   tubing/devices free  from skin contact  Head of Bed (HOB) Positioning: HOB at 30 degrees  Intervention: Promote and Optimize Oral Intake  Flowsheets (Taken 9/17/2023 1404)  Oral Nutrition Promotion:   calorie-dense foods provided   calorie-dense liquids provided   safe use of adaptive equipment encouraged     Problem: Fall Injury Risk  Goal: Absence of Fall and Fall-Related Injury  Outcome: Ongoing, Progressing  Intervention: Identify and Manage Contributors  Flowsheets (Taken 9/17/2023 1404)  Self-Care Promotion:   independence encouraged   BADL personal objects within reach  Medication Review/Management:   medications reviewed   high-risk medications identified  Intervention: Promote Injury-Free Environment  Flowsheets (Taken 9/17/2023 1404)  Safety Promotion/Fall Prevention:   assistive device/personal item within reach   bed alarm set   nonskid shoes/socks when out of bed   side rails raised x 3

## 2023-09-17 NOTE — PLAN OF CARE
Problem: Adult Inpatient Plan of Care  Goal: Plan of Care Review  Outcome: Ongoing, Progressing  Flowsheets (Taken 9/16/2023 2120)  Plan of Care Reviewed With: patient  Goal: Patient-Specific Goal (Individualized)  Outcome: Ongoing, Progressing  Flowsheets (Taken 9/16/2023 2120)  Anxieties, Fears or Concerns: Pain management as needed.  Individualized Care Needs:   Antibiotic therapy as ordered   Wound care   Monitor for s/s of infection   Monitor blood glucose levels.  Goal: Absence of Hospital-Acquired Illness or Injury  Outcome: Ongoing, Progressing  Intervention: Identify and Manage Fall Risk  Flowsheets (Taken 9/16/2023 2120)  Safety Promotion/Fall Prevention:   bed alarm set   assistive device/personal item within reach   Fall Risk reviewed with patient/family   medications reviewed   room near unit station  Intervention: Prevent Skin Injury  Flowsheets (Taken 9/16/2023 2120)  Body Position:   position maintained   weight shifting   tilted   left  Skin Protection:   incontinence pads utilized   tubing/devices free from skin contact  Intervention: Prevent and Manage VTE (Venous Thromboembolism) Risk  Flowsheets (Taken 9/16/2023 2120)  Activity Management: Rolling - L1  VTE Prevention/Management:   bleeding precations maintained   bleeding risk assessed  Range of Motion: ROM (range of motion) performed  Intervention: Prevent Infection  Flowsheets (Taken 9/16/2023 2120)  Infection Prevention:   equipment surfaces disinfected   hand hygiene promoted   personal protective equipment utilized   rest/sleep promoted  Goal: Optimal Comfort and Wellbeing  Outcome: Ongoing, Progressing  Intervention: Monitor Pain and Promote Comfort  Flowsheets (Taken 9/16/2023 2120)  Pain Management Interventions:   care clustered   pain management plan reviewed with patient/caregiver   quiet environment facilitated   medication offered but refused  Intervention: Provide Person-Centered Care  Flowsheets (Taken 9/16/2023 2120)  Trust  Relationship/Rapport:   care explained   choices provided   emotional support provided   empathic listening provided   questions answered   questions encouraged   reassurance provided   thoughts/feelings acknowledged     Problem: Diabetes Comorbidity  Goal: Blood Glucose Level Within Targeted Range  Outcome: Ongoing, Progressing  Intervention: Monitor and Manage Glycemia  Flowsheets (Taken 9/16/2023 2120)  Glycemic Management:   blood glucose monitored   supplemental insulin given   carbohydrate replacement provided     Problem: Nutrition Impaired (Sepsis/Septic Shock)  Goal: Optimal Nutrition Intake  Outcome: Ongoing, Progressing     Problem: Infection  Goal: Absence of Infection Signs and Symptoms  Outcome: Ongoing, Progressing  Intervention: Prevent or Manage Infection  Flowsheets (Taken 9/16/2023 2120)  Fever Reduction/Comfort Measures:   lightweight clothing   lightweight bedding  Infection Management: aseptic technique maintained     Problem: Impaired Wound Healing  Goal: Optimal Wound Healing  Outcome: Ongoing, Progressing     Problem: Fall Injury Risk  Goal: Absence of Fall and Fall-Related Injury  Outcome: Ongoing, Progressing  Intervention: Identify and Manage Contributors  Flowsheets (Taken 9/16/2023 2120)  Medication Review/Management:   medications reviewed   high-risk medications identified  Intervention: Promote Injury-Free Environment  Flowsheets (Taken 9/16/2023 2120)  Safety Promotion/Fall Prevention:   bed alarm set   assistive device/personal item within reach   Fall Risk reviewed with patient/family   medications reviewed   room near unit station
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
## 2023-09-18 LAB
POCT GLUCOSE: 103 MG/DL (ref 70–110)
POCT GLUCOSE: 213 MG/DL (ref 70–110)
POCT GLUCOSE: 67 MG/DL (ref 70–110)

## 2023-09-18 PROCEDURE — 27000207 HC ISOLATION

## 2023-09-18 PROCEDURE — 11000004 HC SNF PRIVATE

## 2023-09-18 PROCEDURE — 94760 N-INVAS EAR/PLS OXIMETRY 1: CPT

## 2023-09-18 PROCEDURE — 25000003 PHARM REV CODE 250: Performed by: INTERNAL MEDICINE

## 2023-09-18 PROCEDURE — 97110 THERAPEUTIC EXERCISES: CPT

## 2023-09-18 PROCEDURE — 25000003 PHARM REV CODE 250: Performed by: STUDENT IN AN ORGANIZED HEALTH CARE EDUCATION/TRAINING PROGRAM

## 2023-09-18 PROCEDURE — 63600175 PHARM REV CODE 636 W HCPCS: Performed by: STUDENT IN AN ORGANIZED HEALTH CARE EDUCATION/TRAINING PROGRAM

## 2023-09-18 RX ADMIN — FERROUS SULFATE TAB 325 MG (65 MG ELEMENTAL FE) 1 EACH: 325 (65 FE) TAB at 09:09

## 2023-09-18 RX ADMIN — CARVEDILOL 12.5 MG: 12.5 TABLET, FILM COATED ORAL at 09:09

## 2023-09-18 RX ADMIN — MEROPENEM 1 G: 1 INJECTION, POWDER, FOR SOLUTION INTRAVENOUS at 06:09

## 2023-09-18 RX ADMIN — INSULIN DETEMIR 20 UNITS: 100 INJECTION, SOLUTION SUBCUTANEOUS at 08:09

## 2023-09-18 RX ADMIN — INSULIN ASPART 7 UNITS: 100 INJECTION, SOLUTION INTRAVENOUS; SUBCUTANEOUS at 09:09

## 2023-09-18 RX ADMIN — SITAGLIPTIN 100 MG: 50 TABLET, FILM COATED ORAL at 09:09

## 2023-09-18 RX ADMIN — HYDROCODONE BITARTRATE AND ACETAMINOPHEN 1 TABLET: 10; 325 TABLET ORAL at 09:09

## 2023-09-18 RX ADMIN — OXYCODONE HYDROCHLORIDE AND ACETAMINOPHEN 500 MG: 500 TABLET ORAL at 09:09

## 2023-09-18 RX ADMIN — SODIUM CHLORIDE: 9 INJECTION, SOLUTION INTRAVENOUS at 12:09

## 2023-09-18 RX ADMIN — SODIUM CHLORIDE 5 ML/HR: 9 INJECTION, SOLUTION INTRAVENOUS at 06:09

## 2023-09-18 RX ADMIN — MEROPENEM 1 G: 1 INJECTION, POWDER, FOR SOLUTION INTRAVENOUS at 08:09

## 2023-09-18 RX ADMIN — SODIUM CHLORIDE: 9 INJECTION, SOLUTION INTRAVENOUS at 08:09

## 2023-09-18 RX ADMIN — MULTIPLE VITAMINS W/ MINERALS TAB 1 TABLET: TAB at 09:09

## 2023-09-18 RX ADMIN — LINEZOLID 600 MG: 600 TABLET, FILM COATED ORAL at 09:09

## 2023-09-18 RX ADMIN — CARVEDILOL 12.5 MG: 12.5 TABLET, FILM COATED ORAL at 05:09

## 2023-09-18 RX ADMIN — ASPIRIN 81 MG: 81 TABLET, COATED ORAL at 09:09

## 2023-09-18 RX ADMIN — INSULIN ASPART 7 UNITS: 100 INJECTION, SOLUTION INTRAVENOUS; SUBCUTANEOUS at 12:09

## 2023-09-18 RX ADMIN — LINEZOLID 600 MG: 600 TABLET, FILM COATED ORAL at 08:09

## 2023-09-18 RX ADMIN — MEROPENEM 1 G: 1 INJECTION, POWDER, FOR SOLUTION INTRAVENOUS at 12:09

## 2023-09-18 NOTE — NURSING
Evening cbg-151; 1 unit was sliding scale; pt refused the required unit    Pt stated he did not want enema due to consistent BM's

## 2023-09-18 NOTE — PROGRESS NOTES
[facility] - Medical Surgical Unit  Wound Care    Patient Name:  Antonio Galvan II   MRN:  [id number]  Date: 9/18/2023  Diagnosis: Open wound of left hip    History:     Past Medical History:   Diagnosis Date    A-fib     Cardiac arrest     7/2022    Chronic skin ulcer     DM (diabetes mellitus)     Infected decubitus ulcer     Lymphedema of leg     Neurogenic bladder     Obesity, unspecified     Pressure ulcer of heel     Pressure ulcer of right foot, stage 3     Pressure ulcer of right ischium     Quadriplegia     Quadriplegic spinal paralysis        Social History     Socioeconomic History    Marital status: Single   Tobacco Use    Smoking status: Never    Smokeless tobacco: Never   Substance and Sexual Activity    Alcohol use: Never    Drug use: Never    Sexual activity: Not Currently     Social Determinants of Health     Financial Resource Strain: Low Risk  (8/24/2023)    Overall Financial Resource Strain (CARDIA)     Difficulty of Paying Living Expenses: Not hard at all   Food Insecurity: No Food Insecurity (8/24/2023)    Hunger Vital Sign     Worried About Running Out of Food in the Last Year: Never true     Ran Out of Food in the Last Year: Never true   Transportation Needs: No Transportation Needs (8/24/2023)    PRAPARE - Transportation     Lack of Transportation (Medical): No     Lack of Transportation (Non-Medical): No   Physical Activity: Inactive (8/24/2023)    Exercise Vital Sign     Days of Exercise per Week: 0 days     Minutes of Exercise per Session: 0 min   Stress: No Stress Concern Present (8/24/2023)    [employer] - Occupational Stress Questionnaire     Feeling of Stress : Only a little   Social Connections: Moderately Integrated (8/24/2023)    Social Connection and Isolation Panel [NHANES]     Frequency of Communication with Friends and Family: More than three times a week     Frequency of Social Gatherings with Friends and Family: More than three times a  week     Attends Faith Services: 1 to 4 times per year     Active Member of Clubs or Organizations: Patient refused     Attends Club or Organization Meetings: 1 to 4 times per year     Marital Status: Never    Housing Stability: Low Risk  (8/24/2023)    Housing Stability Vital Sign     Unable to Pay for Housing in the Last Year: No     Number of Places Lived in the Last Year: 1     Unstable Housing in the Last Year: No       Precautions:     Allergies as of 08/23/2023    (No Known Allergies)       WO Assessment Details/Treatment     Re-assessment performed to all sites. Pt tolerated well. Wounds continue to remain stabilized and improve with current wound care regimen. Recommend continuing to apply adaptic/vashe moistened 4x4/cover dressing to L hip. Recommend continuing to apply adaptic/silver alginate/cover dressing to R ischium. Recommend continuing to apply silver alginate and cover dressing to sacrum. Recommend continue to paint wounds to bilateral feet/lower legs with Betadine and allow to dry. ALICE specialty mattress and heel boots in use. Pt turned to R turn side prior to completing assessment. Pt educated on continued pressure prevention measures and importance of nutrition in wound healing. Pt verbalized understanding. Orders in place.        09/18/23 1205   WOCN Assessment   WOCN Total Time (mins) 75   Visit Date 09/18/23   Consult Type Follow Up   WOCN Speciality Wound   Wound pressure;surgical   Number of Wounds 6   Intervention assessed;chart review   Teaching on-going   Skin Interventions   Device Skin Pressure Protection pressure points protected;absorbent pad utilized/changed   Pressure Reduction Devices positioning supports utilized   Pressure Reduction Techniques positioned off wounds;weight shift assistance provided   Positioning   Body Position turned;right   Head of Bed (HOB) Positioning HOB at 20-30 degrees   Positioning/Transfer Devices pillows;wedge   Pressure Injury Prevention     Check Moisture Management Pad Done   Sacral Foam Dressing Replace   Heel protection technique Heel boot        Altered Skin Integrity 05/06/22 1140 Right Heel  Full thickness tissue loss. Subcutaneous fat may be visible but bone, tendon or muscle are not exposed   Date First Assessed/Time First Assessed: 05/06/22 1140   Altered Skin Integrity Present on Admission: yes  Side: Right  Location: Heel  Is this injury device related?: No  Primary Wound Type: (c)   Description of Altered Skin Integrity: Full thickness...   Wound Image    Description of Altered Skin Integrity Full thickness tissue loss. Subcutaneous fat may be visible but bone, tendon or muscle are not exposed   Dressing Appearance Intact;Dry   Drainage Amount None   Appearance Eschar;Pink;Black;Not granulating   Tissue loss description Full thickness   Black (%), Wound Tissue Color 100 %   Periwound Area Scar tissue   Wound Edges Irregular   Wound Length (cm) 4 cm   Wound Width (cm) 1.8 cm   Wound Depth (cm) 0.1 cm   Wound Volume (cm^3) 0.72 cm^3   Wound Surface Area (cm^2) 7.2 cm^2   Care Cleansed with:;Wound cleanser   Dressing Changed;Gauze;Absorptive Pad;Rolled gauze;Other (comment)  (Betadine)   Periwound Care Skin barrier film applied        Altered Skin Integrity 01/12/23 1530 Sacral spine Full thickness tissue loss with exposed bone, tendon, or muscle. Often includes undermining and tunneling. May extend into muscle and/or supporting structures.   Date First Assessed/Time First Assessed: 01/12/23 1530   Altered Skin Integrity Present on Admission - Did Patient arrive to the hospital with altered skin?: yes  Location: Sacral spine  Description of Altered Skin Integrity: Full thickness tissue los...   Wound Image    Dressing Appearance Intact;Moist drainage   Drainage Amount Small   Drainage Characteristics/Odor Serosanguineous   Appearance Pink;Red;Not granulating   Tissue loss description Full thickness   Red (%), Wound Tissue Color 100 %    Periwound Area Ecchymotic;Scar tissue   Wound Edges Irregular   Wound Length (cm) 3.5 cm   Wound Width (cm) 2.5 cm   Wound Depth (cm) 0.1 cm   Wound Volume (cm^3) 0.875 cm^3   Wound Surface Area (cm^2) 8.75 cm^2   Care Cleansed with:;Wound cleanser   Dressing Changed;Hydrofiber;Silver;Gauze;Absorptive Pad        Altered Skin Integrity 08/01/23 1535 Left anterior Ankle Other (comment) Full thickness tissue loss. Base is covered by slough and/or eschar in the wound bed   Date First Assessed/Time First Assessed: 08/01/23 1535   Altered Skin Integrity Present on Admission - Did Patient arrive to the hospital with altered skin?: yes  Side: Left  Orientation: anterior  Location: Ankle  Is this injury device related?: No  ...   Wound Image    Dressing Appearance Intact;Dry   Drainage Amount None   Appearance Black;Eschar   Tissue loss description Full thickness   Black (%), Wound Tissue Color 100 %   Periwound Area Dry;Intact   Wound Edges Defined   Wound Length (cm) 3.3 cm   Wound Width (cm) 2.8 cm   Wound Depth (cm) 0.1 cm   Wound Volume (cm^3) 0.924 cm^3   Wound Surface Area (cm^2) 9.24 cm^2   Care Cleansed with:;Wound cleanser   Dressing Changed;Gauze;Absorptive Pad;Rolled gauze;Other (comment)  (Betadine)        Altered Skin Integrity 08/01/23 1534 Left posterior Ankle Full thickness tissue loss. Base is covered by slough and/or eschar in the wound bed   Date First Assessed/Time First Assessed: 08/01/23 1534   Altered Skin Integrity Present on Admission - Did Patient arrive to the hospital with altered skin?: yes  Side: Left  Orientation: posterior  Location: Ankle  Description of Altered Skin Integri...   Wound Image    Dressing Appearance Intact;Dry   Drainage Amount None   Appearance Black;Red;Not granulating;Eschar   Tissue loss description Full thickness   Black (%), Wound Tissue Color 95 %   Red (%), Wound Tissue Color 5 %   Periwound Area Dry;Intact   Wound Edges Defined   Wound Length (cm) 4.1 cm   Wound  Width (cm) 3.6 cm   Wound Depth (cm) 0.1 cm   Wound Volume (cm^3) 1.476 cm^3   Wound Surface Area (cm^2) 14.76 cm^2   Care Cleansed with:;Wound cleanser   Dressing Changed;Gauze;Absorptive Pad;Rolled gauze;Other (comment)  (Betadine)        Incision/Site 08/16/23 2011 Right Hip posterior   Date First Assessed/Time First Assessed: 08/16/23 2011   Side: Right  Location: Hip  Orientation: posterior   Wound Image    Dressing Appearance Intact;Moist drainage   Drainage Amount Moderate   Drainage Characteristics/Odor Serosanguineous;No odor   Appearance Red;Pink;Tan;Granulating;Fibrin   Red (%), Wound Tissue Color 90 %   Periwound Area Intact   Wound Edges Defined   Wound Length (cm) 9.3 cm   Wound Width (cm) 11 cm   Wound Depth (cm) 3.6 cm   Wound Volume (cm^3) 368.28 cm^3   Wound Surface Area (cm^2) 102.3 cm^2   Care Cleansed with:;Wound cleanser   Dressing Changed;Hydrofiber;Silver;Non-adherent;Gauze;Absorptive Pad   Packing packed with  (Hydrofiber)   Periwound Care Skin barrier film applied        Incision/Site 08/16/23 2011 Left Hip lateral   Date First Assessed/Time First Assessed: 08/16/23 2011   Side: Left  Location: Hip  Orientation: lateral   Wound Image    Dressing Appearance Intact;Moist drainage   Drainage Amount Moderate   Drainage Characteristics/Odor Serosanguineous;No odor   Appearance Pink;Red;Tan;Not granulating   Periwound Area Intact   Wound Edges Defined   Wound Length (cm) 9.6 cm   Wound Width (cm) 4.8 cm   Wound Depth (cm) 3 cm   Wound Volume (cm^3) 138.24 cm^3   Wound Surface Area (cm^2) 46.08 cm^2   Care Cleansed with:;Sterile normal saline   Dressing Changed;Gauze;Non-adherent;Other (comment);Absorptive Pad  (Vashe wet to dry)   Periwound Care Skin barrier film applied       09/18/2023

## 2023-09-18 NOTE — PT/OT/SLP PROGRESS
Physical Therapy Treatment Note           Name: Antonio Galvan II    : 1965 (57 y.o.)  MRN: [id number]           TREATMENT SUMMARY AND RECOMMENDATIONS:    PT Received On: 23  PT Start Time: 1435     PT Stop Time: 1504  PT Total Time (min): 29 min     Subjective Assessment:   No complaints  Lethargic   x Awake, alert, cooperative  Uncooperative    Agitated  c/o pain    Appropriate  c/o fatigue    Confused x Treated at bedside     Emotionally labile  Treated in gym/dept.    Impulsive  Other:    Flat affect       Therapy Precautions:    Cognitive deficits  Spinal precautions    Collar - hard  Sternal precautions    Collar - soft   TLSO    Fall risk  LSO    Hip precautions - posterior  Knee immobilizer    Hip precautions - anterior  WBAT    Impaired communication  Partial weightbearing    Oxygen  TTWB    PEG tube  NWB    Visual deficits x Other: perineural catheter, PICC line, pressure relief boots     Hearing deficits  x Quadriplegia        Treatment Objectives:     Mobility Training:   Assist level Comments    Bed mobility     Transfer     Gait     Sit to stand transitions     Sitting balance     Standing balance      Wheelchair mobility     Car transfer     Other:          Therapeutic Exercise:   Exercise Sets Reps Comments   PROM to B UE and LE, all joints and digits   All functional planes to tolerance with sustained hold at end ranges                         Additional Comments:  Pt with more fatigue today, stating he has been in and out of sleep. Wound care reporting wounds look better. Improved ROM demonstrated.     Still awaiting wound care clearance to allow OOB or EOB mobility. R    Assessment: Patient tolerated session well.      PT Plan: continue per POC  Revisions made to plan of care: No    GOALS:   Multidisciplinary Problems       Physical Therapy Goals          Problem: Physical Therapy    Goal Priority Disciplines Outcome Goal Variances Interventions   Physical Therapy Goal      PT, PT/OT Ongoing, Progressing     Description: Goals to be met by: Discharge     Patient will increase functional independence with mobility by performin.  Pt to receive PROM BLEs and UEs 5-6 d/wk, qd,  to prevent further contractures and ensure ability for positioning in WC, along with proper positioning to reduce further skin break down  2.  Pt to be assessed for appropriateness for out of bed to WC - awaiting wound care                       Skilled PT Minutes Provided: 25   Communication with Treatment Team:     Equipment recommendations:       At end of treatment, patient remained:   Up in chair     Up in wheelchair in room   x In bed    With alarm activated   x Bed rails up   x Call bell in reach    x Family/friends present    Restraints secured properly    In bathroom with CNA/RN notified   x Nurse aware    In gym with therapist/tech    Other:

## 2023-09-18 NOTE — PLAN OF CARE
Problem: Adult Inpatient Plan of Care  Goal: Plan of Care Review  Outcome: Ongoing, Progressing  Flowsheets (Taken 9/18/2023 1639)  Plan of Care Reviewed With: patient  Goal: Patient-Specific Goal (Individualized)  Outcome: Ongoing, Progressing  Flowsheets (Taken 9/18/2023 1639)  Anxieties, Fears or Concerns: None expressed  Individualized Care Needs: Abx therapy, Wound care, Monitor for signs of pain,Monitor CBGs  Goal: Absence of Hospital-Acquired Illness or Injury  Outcome: Ongoing, Progressing  Intervention: Identify and Manage Fall Risk  Flowsheets (Taken 9/18/2023 1639)  Safety Promotion/Fall Prevention:   assistive device/personal item within reach   family to remain at bedside  Intervention: Prevent Skin Injury  Flowsheets (Taken 9/18/2023 1639)  Body Position:   turned   left   heels elevated  Skin Protection:   adhesive use limited   incontinence pads utilized   tubing/devices free from skin contact  Intervention: Prevent and Manage VTE (Venous Thromboembolism) Risk  Flowsheets (Taken 9/18/2023 1639)  Activity Management: Patient unable to perform activities  VTE Prevention/Management: bleeding precations maintained  Range of Motion: active ROM (range of motion) encouraged  Intervention: Prevent Infection  Flowsheets (Taken 9/18/2023 1639)  Infection Prevention:   equipment surfaces disinfected   hand hygiene promoted   rest/sleep promoted  Goal: Optimal Comfort and Wellbeing  Outcome: Ongoing, Progressing  Intervention: Monitor Pain and Promote Comfort  Flowsheets (Taken 9/18/2023 1639)  Pain Management Interventions:   care clustered   position adjusted   pillow support provided  Intervention: Provide Person-Centered Care  Flowsheets (Taken 9/18/2023 1639)  Trust Relationship/Rapport:   care explained   choices provided   emotional support provided   empathic listening provided   questions answered   questions encouraged   reassurance provided   thoughts/feelings acknowledged  Goal: Readiness for  Transition of Care  Outcome: Ongoing, Progressing  Intervention: Mutually Develop Transition Plan  Flowsheets (Taken 9/18/2023 1639)  Communicated SADE with patient/caregiver: Date not available/Unable to determine     Problem: Diabetes Comorbidity  Goal: Blood Glucose Level Within Targeted Range  Outcome: Ongoing, Progressing  Intervention: Monitor and Manage Glycemia  Flowsheets (Taken 9/18/2023 1639)  Glycemic Management:   blood glucose monitored   supplemental insulin given     Problem: Adjustment to Illness (Sepsis/Septic Shock)  Goal: Optimal Coping  Outcome: Ongoing, Progressing  Intervention: Optimize Psychosocial Adjustment to Illness  Flowsheets (Taken 9/18/2023 1639)  Supportive Measures: active listening utilized  Family/Support System Care: self-care encouraged     Problem: Bleeding (Sepsis/Septic Shock)  Goal: Absence of Bleeding  Outcome: Ongoing, Progressing  Intervention: Monitor and Manage Bleeding  Flowsheets (Taken 9/18/2023 1639)  Bleeding Precautions:   blood pressure closely monitored   monitored for signs of bleeding  Bleeding Management: dressing monitored     Problem: Glycemic Control Impaired (Sepsis/Septic Shock)  Goal: Blood Glucose Level Within Desired Range  Outcome: Ongoing, Progressing  Intervention: Optimize Glycemic Control  Flowsheets (Taken 9/18/2023 1639)  Glycemic Management:   blood glucose monitored   supplemental insulin given     Problem: Infection Progression (Sepsis/Septic Shock)  Goal: Absence of Infection Signs and Symptoms  Outcome: Ongoing, Progressing  Intervention: Initiate Sepsis Management  Flowsheets (Taken 9/18/2023 1639)  Infection Prevention:   equipment surfaces disinfected   hand hygiene promoted   rest/sleep promoted  Infection Management: aseptic technique maintained  Stabilization Measures: legs elevated  Isolation Precautions:   contact   precautions maintained  Intervention: Promote Stabilization  Flowsheets (Taken 9/18/2023 1639)  Fever  Reduction/Comfort Measures:   lightweight clothing   lightweight bedding  Fluid/Electrolyte Management: fluids provided  Intervention: Promote Recovery  Flowsheets (Taken 9/18/2023 1639)  Sleep/Rest Enhancement:   relaxation techniques promoted   noise level reduced   natural light exposure provided  Activity Management: Patient unable to perform activities     Problem: Nutrition Impaired (Sepsis/Septic Shock)  Goal: Optimal Nutrition Intake  Outcome: Ongoing, Progressing     Problem: Infection  Goal: Absence of Infection Signs and Symptoms  Outcome: Ongoing, Progressing  Intervention: Prevent or Manage Infection  Flowsheets (Taken 9/18/2023 1639)  Fever Reduction/Comfort Measures:   lightweight clothing   lightweight bedding  Infection Management: aseptic technique maintained  Isolation Precautions:   contact   precautions maintained     Problem: Impaired Wound Healing  Goal: Optimal Wound Healing  Outcome: Ongoing, Progressing  Intervention: Promote Wound Healing  Flowsheets (Taken 9/18/2023 1639)  Oral Nutrition Promotion:   calorie-dense foods provided   calorie-dense liquids provided   safe use of adaptive equipment encouraged  Sleep/Rest Enhancement:   relaxation techniques promoted   noise level reduced   natural light exposure provided  Activity Management: Patient unable to perform activities  Pain Management Interventions:   care clustered   position adjusted   pillow support provided     Problem: Skin Injury Risk Increased  Goal: Skin Health and Integrity  Outcome: Ongoing, Progressing  Intervention: Optimize Skin Protection  Flowsheets (Taken 9/18/2023 1639)  Pressure Reduction Techniques:   positioned off wounds   weight shift assistance provided  Pressure Reduction Devices: positioning supports utilized  Skin Protection:   adhesive use limited   incontinence pads utilized   tubing/devices free from skin contact  Head of Bed (HOB) Positioning: HOB at 20-30 degrees  Intervention: Promote and Optimize  Oral Intake  Flowsheets (Taken 9/18/2023 1639)  Oral Nutrition Promotion:   calorie-dense foods provided   calorie-dense liquids provided   safe use of adaptive equipment encouraged     Problem: Fall Injury Risk  Goal: Absence of Fall and Fall-Related Injury  Outcome: Ongoing, Progressing  Intervention: Identify and Manage Contributors  Flowsheets (Taken 9/18/2023 1639)  Self-Care Promotion:   BADL personal objects within reach   safe use of adaptive equipment encouraged  Medication Review/Management: medications reviewed  Intervention: Promote Injury-Free Environment  Flowsheets (Taken 9/18/2023 1639)  Safety Promotion/Fall Prevention:   assistive device/personal item within reach   family to remain at bedside

## 2023-09-18 NOTE — PROGRESS NOTES
Inpatient Nutrition Evaluation    Admit Date: 8/23/2023   Total duration of encounter: 26 days    Nutrition Recommendation/Prescription     Continue diabetic diet double double protein  2. Continue boost glucose control TID and Jvuen BID, used in house substitution arginaid 2. Add prosource 1 pk BID.    Nutrition Assessment     Chart Review    Reason Seen: continuous nutrition monitoring, length of stay, and follow-up    Malnutrition Screening Tool Results   Have you recently lost weight without trying?: No  Have you been eating poorly because of a decreased appetite?: No   MST Score: 0     Diagnosis:  : Open wound of left hip    Relevant Medical History:    A-fib      Cardiac arrest       7/2022    Chronic skin ulcer      DM (diabetes mellitus)      Infected decubitus ulcer      Lymphedema of leg      Neurogenic bladder      Obesity, unspecified      Pressure ulcer of heel      Pressure ulcer of right foot, stage 3      Pressure ulcer of right ischium      Quadriplegia      Quadriplegic spinal paralysis          Nutrition-Related Medications: ascorbic acid, ferrous sulfate, insulin aspart, insulin detemir, multivitamin, senna-docusate,    Nutrition-Related Labs:   Latest Reference Range & Units 09/18/23 06:23   POCT Glucose 70 - 110 mg/dL 103       Diet Order: Diet diabetic  Oral Supplement Order: Boost Glucose Control, Cole, and Prosource No Carb  Appetite/Oral Intake: good/% of meals  Factors Affecting Nutritional Intake:  open wound left hip  Food/Jainism/Cultural Preferences:  grilled chicken sandwich  Food Allergies: none reported    Skin Integrity: wound, excoriation  Wound(s):      Altered Skin Integrity 05/06/22 1140 Right Heel  Full thickness tissue loss. Subcutaneous fat may be visible but bone, tendon or muscle are not exposed-Tissue loss description: Full thickness       Altered Skin Integrity 01/12/23 1530 Sacral spine Full thickness tissue loss with exposed bone, tendon, or muscle. Often  "includes undermining and tunneling. May extend into muscle and/or supporting structures.-Tissue loss description: Full thickness       Altered Skin Integrity 08/01/23 1535 Left anterior Ankle Other (comment) Full thickness tissue loss. Base is covered by slough and/or eschar in the wound bed-Tissue loss description: Full thickness       Altered Skin Integrity 08/01/23 1534 Left posterior Ankle Full thickness tissue loss. Base is covered by slough and/or eschar in the wound bed-Tissue loss description: Full thickness     Comments    Pt intake is good. Discussed food preferences. Marked menus. Reviewed wound care nurse noted, seems to stabilized and improved with current wound care regimen. Will monitor.     Anthropometrics    Height: 5' 7.99" (172.7 cm) Height Method: Stated  Last Weight:  (could not weigh patient due to bed showing negative weight) (09/18/23 0500) Weight Method: Bed Scale  BMI (Calculated): 33.4  BMI Classification: obese grade I (BMI 30-34.9)        Ideal Body Weight (IBW), Male: 153.94 lb     % Ideal Body Weight, Male (lb): 152.66 %                          Usual Weight Provided By: unable to obtain usual weight    Wt Readings from Last 5 Encounters:   08/16/23 106.4 kg (234 lb 9.6 oz)   08/15/23 113.4 kg (250 lb)   08/14/23 108.9 kg (240 lb)   06/23/23 100.7 kg (222 lb 0.1 oz)   06/22/23 100.7 kg (222 lb 0.1 oz)     Weight Change(s) Since Admission:  Admit Weight: 106.6 kg (235 lb 0.2 oz) (08/23/23 1605)  No new wt recorded. Bed scale giving negative number, nursing reports.     Patient Education    Not applicable.    Monitoring & Evaluation     Dietitian will monitor food and beverage intake, weight, weight change, electrolyte/renal panel, glucose/endocrine profile, and gastrointestinal profile.  Nutrition Risk/Follow-Up: low (follow-up in 5-7 days)  Patients assigned 'low nutrition risk' status do not qualify for a full nutritional assessment but will be monitored and re-evaluated in a 5-7 day " time period. Please consult if re-evaluation needed sooner.

## 2023-09-18 NOTE — PROGRESS NOTES
[facility]  Wound Care    Patient Name:  Antonio Galvan II   MRN:  [id number]  Date: 9/18/2023  Diagnosis: Open wound of left hip    History:     Past Medical History:   Diagnosis Date    A-fib     Cardiac arrest     7/2022    Chronic skin ulcer     DM (diabetes mellitus)     Infected decubitus ulcer     Lymphedema of leg     Neurogenic bladder     Obesity, unspecified     Pressure ulcer of heel     Pressure ulcer of right foot, stage 3     Pressure ulcer of right ischium     Quadriplegia     Quadriplegic spinal paralysis        Social History     Socioeconomic History    Marital status: Single   Tobacco Use    Smoking status: Never    Smokeless tobacco: Never   Substance and Sexual Activity    Alcohol use: Never    Drug use: Never    Sexual activity: Not Currently     Social Determinants of Health     Financial Resource Strain: Low Risk  (8/24/2023)    Overall Financial Resource Strain (CARDIA)     Difficulty of Paying Living Expenses: Not hard at all   Food Insecurity: No Food Insecurity (8/24/2023)    Hunger Vital Sign     Worried About Running Out of Food in the Last Year: Never true     Ran Out of Food in the Last Year: Never true   Transportation Needs: No Transportation Needs (8/24/2023)    PRAPARE - Transportation     Lack of Transportation (Medical): No     Lack of Transportation (Non-Medical): No   Physical Activity: Inactive (8/24/2023)    Exercise Vital Sign     Days of Exercise per Week: 0 days     Minutes of Exercise per Session: 0 min   Stress: No Stress Concern Present (8/24/2023)    [employer] [place] [employer] - Occupational Stress Questionnaire     Feeling of Stress : Only a little   Social Connections: Moderately Integrated (8/24/2023)    Social Connection and Isolation Panel [NHANES]     Frequency of Communication with Friends and Family: More than three times a week     Frequency of Social Gatherings with Friends and Family: More than three times a  week     Attends Restorationism Services: 1 to 4 times per year     Active Member of Clubs or Organizations: Patient refused     Attends Club or Organization Meetings: 1 to 4 times per year     Marital Status: Never    Housing Stability: Low Risk  (8/24/2023)    Housing Stability Vital Sign     Unable to Pay for Housing in the Last Year: No     Number of Places Lived in the Last Year: 1     Unstable Housing in the Last Year: No       Precautions:     Allergies as of 08/23/2023    (No Known Allergies)       WO Assessment Details/Treatment     Re-assessment performed to all sites. Pt tolerated well. Wounds continue to remain stabilized and improve with current wound care regimen. Recommend continuing to apply adaptic/vashe moistened 4x4/cover dressing to L hip. Recommend continuing to apply adaptic/silver alginate/cover dressing to R ischium. Recommend continuing to apply silver alginate and cover dressing to sacrum. Recommend continue to paint wounds to bilateral feet/lower legs with Betadine and allow to dry. ALICE specialty mattress and heel boots in use. Pt turned to R turn side prior to completing assessment. Pt educated on continued pressure prevention measures and importance of nutrition in wound healing. Pt verbalized understanding. Orders in place.     09/18/2023
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
## 2023-09-18 NOTE — PLAN OF CARE
Problem: Diabetes Comorbidity  Goal: Blood Glucose Level Within Targeted Range  Outcome: Ongoing, Progressing  Intervention: Monitor and Manage Glycemia  Flowsheets (Taken 9/17/2023 2255)  Glycemic Management: blood glucose monitored     Problem: Fall Injury Risk  Goal: Absence of Fall and Fall-Related Injury  Outcome: Ongoing, Progressing  Intervention: Identify and Manage Contributors  Flowsheets (Taken 9/17/2023 2255)  Self-Care Promotion:   BADL personal objects within reach   BADL personal routines maintained  Medication Review/Management: medications reviewed  Intervention: Promote Injury-Free Environment  Flowsheets (Taken 9/17/2023 2255)  Safety Promotion/Fall Prevention: (4-railing up per pt request)   bed alarm set   room near unit station   other (see comments)

## 2023-09-18 NOTE — PT/OT/SLP PROGRESS
Name: Antonio LAGUERRE Mandy PATTERSON    : 1965 (57 y.o.)  MRN: [id number]           Patient Unavailable      Patient unable to be seen at this time secondary to: Pt undergoing wound care at this time. PT to return this PM

## 2023-09-19 LAB
POCT GLUCOSE: 120 MG/DL (ref 70–110)
POCT GLUCOSE: 122 MG/DL (ref 70–110)
POCT GLUCOSE: 84 MG/DL (ref 70–110)

## 2023-09-19 PROCEDURE — 27000207 HC ISOLATION

## 2023-09-19 PROCEDURE — 94760 N-INVAS EAR/PLS OXIMETRY 1: CPT

## 2023-09-19 PROCEDURE — 25000003 PHARM REV CODE 250: Performed by: INTERNAL MEDICINE

## 2023-09-19 PROCEDURE — 97110 THERAPEUTIC EXERCISES: CPT

## 2023-09-19 PROCEDURE — 63600175 PHARM REV CODE 636 W HCPCS: Performed by: STUDENT IN AN ORGANIZED HEALTH CARE EDUCATION/TRAINING PROGRAM

## 2023-09-19 PROCEDURE — 11000004 HC SNF PRIVATE

## 2023-09-19 PROCEDURE — 25000003 PHARM REV CODE 250: Performed by: STUDENT IN AN ORGANIZED HEALTH CARE EDUCATION/TRAINING PROGRAM

## 2023-09-19 RX ADMIN — SODIUM CHLORIDE: 9 INJECTION, SOLUTION INTRAVENOUS at 04:09

## 2023-09-19 RX ADMIN — INSULIN DETEMIR 15 UNITS: 100 INJECTION, SOLUTION SUBCUTANEOUS at 08:09

## 2023-09-19 RX ADMIN — FERROUS SULFATE TAB 325 MG (65 MG ELEMENTAL FE) 1 EACH: 325 (65 FE) TAB at 09:09

## 2023-09-19 RX ADMIN — ASPIRIN 81 MG: 81 TABLET, COATED ORAL at 09:09

## 2023-09-19 RX ADMIN — SITAGLIPTIN 100 MG: 50 TABLET, FILM COATED ORAL at 09:09

## 2023-09-19 RX ADMIN — INSULIN ASPART 7 UNITS: 100 INJECTION, SOLUTION INTRAVENOUS; SUBCUTANEOUS at 05:09

## 2023-09-19 RX ADMIN — MEROPENEM 1 G: 1 INJECTION, POWDER, FOR SOLUTION INTRAVENOUS at 04:09

## 2023-09-19 RX ADMIN — MEROPENEM 1 G: 1 INJECTION, POWDER, FOR SOLUTION INTRAVENOUS at 12:09

## 2023-09-19 RX ADMIN — LINEZOLID 600 MG: 600 TABLET, FILM COATED ORAL at 09:09

## 2023-09-19 RX ADMIN — LINEZOLID 600 MG: 600 TABLET, FILM COATED ORAL at 08:09

## 2023-09-19 RX ADMIN — SODIUM CHLORIDE: 9 INJECTION, SOLUTION INTRAVENOUS at 12:09

## 2023-09-19 RX ADMIN — MEROPENEM 1 G: 1 INJECTION, POWDER, FOR SOLUTION INTRAVENOUS at 08:09

## 2023-09-19 RX ADMIN — CARVEDILOL 12.5 MG: 12.5 TABLET, FILM COATED ORAL at 09:09

## 2023-09-19 RX ADMIN — CARVEDILOL 12.5 MG: 12.5 TABLET, FILM COATED ORAL at 05:09

## 2023-09-19 RX ADMIN — MULTIPLE VITAMINS W/ MINERALS TAB 1 TABLET: TAB at 09:09

## 2023-09-19 RX ADMIN — HYDROCODONE BITARTRATE AND ACETAMINOPHEN 1 TABLET: 10; 325 TABLET ORAL at 10:09

## 2023-09-19 RX ADMIN — SODIUM CHLORIDE: 9 INJECTION, SOLUTION INTRAVENOUS at 08:09

## 2023-09-19 RX ADMIN — OXYCODONE HYDROCHLORIDE AND ACETAMINOPHEN 500 MG: 500 TABLET ORAL at 09:09
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
## 2023-09-19 NOTE — PLAN OF CARE
Problem: Adult Inpatient Plan of Care  Goal: Plan of Care Review  Outcome: Ongoing, Progressing  Flowsheets (Taken 9/18/2023 2330)  Plan of Care Reviewed With: patient  Goal: Patient-Specific Goal (Individualized)  Outcome: Ongoing, Progressing  Flowsheets (Taken 9/18/2023 2330)  Anxieties, Fears or Concerns: none expressed  Individualized Care Needs: abx therapy, montior pt for s/s of pain and monitor wounds until end of shift 7a  Goal: Absence of Hospital-Acquired Illness or Injury  Outcome: Ongoing, Progressing  Intervention: Identify and Manage Fall Risk  Flowsheets (Taken 9/18/2023 2330)  Safety Promotion/Fall Prevention:   assistive device/personal item within reach   bed alarm set   Fall Risk signage in place   side rails raised x 3  Intervention: Prevent Skin Injury  Flowsheets (Taken 9/18/2023 2330)  Body Position: position maintained  Skin Protection: adhesive use limited  Intervention: Prevent and Manage VTE (Venous Thromboembolism) Risk  Flowsheets (Taken 9/18/2023 2330)  Activity Management: Patient unable to perform activities  VTE Prevention/Management: bleeding precations maintained  Range of Motion: active ROM (range of motion) encouraged  Intervention: Prevent Infection  Flowsheets (Taken 9/18/2023 2330)  Infection Prevention:   environmental surveillance performed   rest/sleep promoted   single patient room provided   equipment surfaces disinfected   hand hygiene promoted   personal protective equipment utilized

## 2023-09-19 NOTE — PT/OT/SLP PROGRESS
Physical Therapy Treatment Note           Name: Antonio Galvan II    : 1965 (57 y.o.)  MRN: [id number]           TREATMENT SUMMARY AND RECOMMENDATIONS:    PT Received On: 23  PT Start Time: 955     PT Stop Time: 5  PT Total Time (min): 20 min     Subjective Assessment:   No complaints  Lethargic   x Awake, alert, cooperative  Uncooperative    Agitated  c/o pain    Appropriate  c/o fatigue    Confused x Treated at bedside     Emotionally labile  Treated in gym/dept.    Impulsive  Other:    Flat affect       Therapy Precautions:    Cognitive deficits  Spinal precautions    Collar - hard  Sternal precautions    Collar - soft   TLSO    Fall risk  LSO    Hip precautions - posterior  Knee immobilizer    Hip precautions - anterior  WBAT    Impaired communication  Partial weightbearing    Oxygen  TTWB    PEG tube  NWB    Visual deficits x Other: perineural catheter, PICC line, pressure relief boots     Hearing deficits  x Quadriplegia        Treatment Objectives:     Mobility Training:   Assist level Comments    Bed mobility     Transfer     Gait     Sit to stand transitions     Sitting balance     Standing balance      Wheelchair mobility     Car transfer     Other:          Therapeutic Exercise:   Exercise Sets Reps Comments   PROM to B UE and LE, all joints and digits   All functional planes to tolerance with sustained hold at end ranges                         Additional Comments:  Pt without new complications. IV antibiotics finish on .     Still awaiting wound care clearance to allow OOB or EOB mobility. Re-eval of wounds to be completed soon.     Assessment: Patient tolerated session well.      PT Plan: continue per POC  Revisions made to plan of care: No    GOALS:   Multidisciplinary Problems       Physical Therapy Goals          Problem: Physical Therapy    Goal Priority Disciplines Outcome Goal Variances Interventions   Physical Therapy Goal     PT, PT/OT Ongoing, Progressing      Description: Goals to be met by: Discharge     Patient will increase functional independence with mobility by performin.  Pt to receive PROM BLEs and UEs 5-6 d/wk, qd,  to prevent further contractures and ensure ability for positioning in WC, along with proper positioning to reduce further skin break down  2.  Pt to be assessed for appropriateness for out of bed to WC - awaiting wound care                       Skilled PT Minutes Provided: 20   Communication with Treatment Team:     Equipment recommendations:       At end of treatment, patient remained:   Up in chair     Up in wheelchair in room   x In bed    With alarm activated   x Bed rails up   x Call bell in reach    x Family/friends present    Restraints secured properly    In bathroom with CNA/RN notified   x Nurse aware    In gym with therapist/tech    Other:

## 2023-09-19 NOTE — PLAN OF CARE
"  Problem: Adult Inpatient Plan of Care  Goal: Plan of Care Review  Outcome: Ongoing, Progressing  Flowsheets (Taken 9/19/2023 1515)  Plan of Care Reviewed With: patient  Goal: Patient-Specific Goal (Individualized)  Outcome: Ongoing, Progressing  Flowsheets (Taken 9/19/2023 1515)  Anxieties, Fears or Concerns: pt wanting to go home and "sit in my own chair"  Individualized Care Needs: IV antibiotics, maintain skin integrity, wound care, watch for bleeding  Goal: Absence of Hospital-Acquired Illness or Injury  Outcome: Ongoing, Progressing  Intervention: Identify and Manage Fall Risk  Flowsheets (Taken 9/19/2023 1515)  Safety Promotion/Fall Prevention:   assistive device/personal item within reach   bed alarm set   family to remain at bedside   instructed to call staff for mobility   room near unit station   side rails raised x 3  Intervention: Prevent Skin Injury  Flowsheets (Taken 9/19/2023 1515)  Body Position: sitting up in bed  Skin Protection:   adhesive use limited   incontinence pads utilized   transparent dressing maintained   tubing/devices free from skin contact   skin-to-device areas padded  Intervention: Prevent and Manage VTE (Venous Thromboembolism) Risk  Flowsheets (Taken 9/19/2023 1515)  Activity Management: Patient unable to perform activities  VTE Prevention/Management:   ambulation promoted   bleeding precations maintained   fluids promoted  Range of Motion: active ROM (range of motion) encouraged  Intervention: Prevent Infection  Flowsheets (Taken 9/19/2023 1515)  Infection Prevention:   equipment surfaces disinfected   hand hygiene promoted   personal protective equipment utilized   cohorting utilized   environmental surveillance performed  Goal: Optimal Comfort and Wellbeing  Outcome: Ongoing, Progressing  Intervention: Monitor Pain and Promote Comfort  Flowsheets (Taken 9/19/2023 1515)  Pain Management Interventions:   relaxation techniques promoted   quiet environment facilitated   care " clustered   pain management plan reviewed with patient/caregiver  Intervention: Provide Person-Centered Care  Flowsheets (Taken 9/19/2023 1515)  Trust Relationship/Rapport:   care explained   choices provided   questions encouraged  Goal: Readiness for Transition of Care  Outcome: Ongoing, Progressing  Intervention: Mutually Develop Transition Plan  Flowsheets (Taken 9/19/2023 1515)  Communicated SADE with patient/caregiver: Date not available/Unable to determine     Problem: Diabetes Comorbidity  Goal: Blood Glucose Level Within Targeted Range  Outcome: Ongoing, Progressing  Intervention: Monitor and Manage Glycemia  Flowsheets (Taken 9/19/2023 1515)  Glycemic Management:   blood glucose monitored   supplemental insulin given   oral hydration promoted     Problem: Adjustment to Illness (Sepsis/Septic Shock)  Goal: Optimal Coping  Outcome: Ongoing, Progressing  Intervention: Optimize Psychosocial Adjustment to Illness  Flowsheets (Taken 9/19/2023 1515)  Supportive Measures: active listening utilized  Family/Support System Care:   self-care encouraged   presence promoted     Problem: Bleeding (Sepsis/Septic Shock)  Goal: Absence of Bleeding  Outcome: Ongoing, Progressing  Intervention: Monitor and Manage Bleeding  Flowsheets (Taken 9/19/2023 1515)  Bleeding Precautions:   blood pressure closely monitored   monitored for signs of bleeding  Bleeding Management:   dressing monitored   packing maintained     Problem: Glycemic Control Impaired (Sepsis/Septic Shock)  Goal: Blood Glucose Level Within Desired Range  Outcome: Ongoing, Progressing  Intervention: Optimize Glycemic Control  Flowsheets (Taken 9/19/2023 1515)  Glycemic Management:   blood glucose monitored   supplemental insulin given   oral hydration promoted     Problem: Infection Progression (Sepsis/Septic Shock)  Goal: Absence of Infection Signs and Symptoms  Outcome: Ongoing, Progressing  Intervention: Initiate Sepsis Management  Flowsheets (Taken 9/19/2023  1515)  Infection Prevention:   equipment surfaces disinfected   hand hygiene promoted   personal protective equipment utilized   cohorting utilized   environmental surveillance performed  Infection Management: aseptic technique maintained  Isolation Precautions:   contact   precautions maintained  Intervention: Promote Stabilization  Flowsheets (Taken 9/19/2023 1515)  Fever Reduction/Comfort Measures:   lightweight clothing   lightweight bedding  Fluid/Electrolyte Management: fluids provided  Intervention: Promote Recovery  Flowsheets (Taken 9/19/2023 1515)  Sleep/Rest Enhancement:   awakenings minimized   regular sleep/rest pattern promoted   relaxation techniques promoted  Airway/Ventilation Support: comfort measures provided  Activity Management: Patient unable to perform activities     Problem: Nutrition Impaired (Sepsis/Septic Shock)  Goal: Optimal Nutrition Intake  Outcome: Ongoing, Progressing     Problem: Infection  Goal: Absence of Infection Signs and Symptoms  Outcome: Ongoing, Progressing  Intervention: Prevent or Manage Infection  Flowsheets (Taken 9/19/2023 1515)  Fever Reduction/Comfort Measures:   lightweight clothing   lightweight bedding  Infection Management: aseptic technique maintained  Isolation Precautions:   contact   precautions maintained     Problem: Impaired Wound Healing  Goal: Optimal Wound Healing  Outcome: Ongoing, Progressing  Intervention: Promote Wound Healing  Flowsheets (Taken 9/19/2023 1515)  Oral Nutrition Promotion:   calorie-dense foods provided   calorie-dense liquids provided   social interaction promoted   rest periods promoted   safe use of adaptive equipment encouraged   nutritional therapy counseling provided  Sleep/Rest Enhancement:   awakenings minimized   regular sleep/rest pattern promoted   relaxation techniques promoted  Activity Management: Patient unable to perform activities  Pain Management Interventions:   relaxation techniques promoted   quiet environment  facilitated   care clustered   pain management plan reviewed with patient/caregiver     Problem: Skin Injury Risk Increased  Goal: Skin Health and Integrity  Outcome: Ongoing, Progressing  Intervention: Optimize Skin Protection  Flowsheets (Taken 9/19/2023 1515)  Pressure Reduction Techniques:   frequent weight shift encouraged   weight shift assistance provided   positioned off wounds   pressure points protected   heels elevated off bed  Pressure Reduction Devices: positioning supports utilized  Skin Protection:   adhesive use limited   incontinence pads utilized   transparent dressing maintained   tubing/devices free from skin contact   skin-to-device areas padded  Head of Bed (HOB) Positioning: HOB at 20-30 degrees  Intervention: Promote and Optimize Oral Intake  Flowsheets (Taken 9/19/2023 1515)  Oral Nutrition Promotion:   calorie-dense foods provided   calorie-dense liquids provided   social interaction promoted   rest periods promoted   safe use of adaptive equipment encouraged   nutritional therapy counseling provided     Problem: Fall Injury Risk  Goal: Absence of Fall and Fall-Related Injury  Outcome: Ongoing, Progressing  Intervention: Identify and Manage Contributors  Flowsheets (Taken 9/19/2023 1515)  Self-Care Promotion:   BADL personal routines maintained   safe use of adaptive equipment encouraged  Medication Review/Management: medications reviewed  Intervention: Promote Injury-Free Environment  Flowsheets (Taken 9/19/2023 1515)  Safety Promotion/Fall Prevention:   assistive device/personal item within reach   bed alarm set   family to remain at bedside   instructed to call staff for mobility   room near unit station   side rails raised x 3

## 2023-09-19 NOTE — PLAN OF CARE
[facility]  Discharge Reassessment    Primary Care Provider: Jennifer Fernando NP    Expected Discharge Date:     Reassessment (most recent)       Discharge Reassessment - 09/19/23 1849          Discharge Reassessment    Assessment Type Discharge Planning Reassessment     Did the patient's condition or plan change since previous assessment? No     Discharge Plan discussed with: Parent(s)     Name(s) and Number(s) Judy mother     Communicated SADE with patient/caregiver Date not available/Unable to determine     Discharge Plan A Home with family;Home Health     DME Needed Upon Discharge  none     Transition of Care Barriers None     Why the patient remains in the hospital Requires continued medical care        Post-Acute Status    Post-Acute Authorization Home Health     Home Health Status Pending medical clearance/testing     Hospital Resources/Appts/Education Provided Provided patient/caregiver with written discharge plan information     Discharge Delays None known at this time

## 2023-09-20 LAB
ANION GAP SERPL CALC-SCNC: 8 MEQ/L
BASOPHILS # BLD AUTO: 0.02 X10(3)/MCL
BASOPHILS NFR BLD AUTO: 0.4 %
BUN SERPL-MCNC: 23.3 MG/DL (ref 8.4–25.7)
CALCIUM SERPL-MCNC: 8.5 MG/DL (ref 8.4–10.2)
CHLORIDE SERPL-SCNC: 107 MMOL/L (ref 98–107)
CO2 SERPL-SCNC: 25 MMOL/L (ref 22–29)
CREAT SERPL-MCNC: 0.86 MG/DL (ref 0.73–1.18)
CREAT/UREA NIT SERPL: 27
EOSINOPHIL # BLD AUTO: 0.29 X10(3)/MCL (ref 0–0.9)
EOSINOPHIL NFR BLD AUTO: 5.6 %
ERYTHROCYTE [DISTWIDTH] IN BLOOD BY AUTOMATED COUNT: 17.9 % (ref 11.5–17)
GFR SERPLBLD CREATININE-BSD FMLA CKD-EPI: >60 MLS/MIN/1.73/M2
GLUCOSE SERPL-MCNC: 78 MG/DL (ref 74–100)
HCT VFR BLD AUTO: 34.8 % (ref 42–52)
HGB BLD-MCNC: 10.1 G/DL (ref 14–18)
IMM GRANULOCYTES # BLD AUTO: 0.01 X10(3)/MCL (ref 0–0.04)
IMM GRANULOCYTES NFR BLD AUTO: 0.2 %
LYMPHOCYTES # BLD AUTO: 2.09 X10(3)/MCL (ref 0.6–4.6)
LYMPHOCYTES NFR BLD AUTO: 40.3 %
MCH RBC QN AUTO: 26.7 PG (ref 27–31)
MCHC RBC AUTO-ENTMCNC: 29 G/DL (ref 33–36)
MCV RBC AUTO: 92.1 FL (ref 80–94)
MONOCYTES # BLD AUTO: 0.31 X10(3)/MCL (ref 0.1–1.3)
MONOCYTES NFR BLD AUTO: 6 %
NEUTROPHILS # BLD AUTO: 2.47 X10(3)/MCL (ref 2.1–9.2)
NEUTROPHILS NFR BLD AUTO: 47.5 %
PLATELET # BLD AUTO: 171 X10(3)/MCL (ref 130–400)
PMV BLD AUTO: 9.9 FL (ref 7.4–10.4)
POCT GLUCOSE: 124 MG/DL (ref 70–110)
POCT GLUCOSE: 144 MG/DL (ref 70–110)
POCT GLUCOSE: 70 MG/DL (ref 70–110)
POTASSIUM SERPL-SCNC: 4.7 MMOL/L (ref 3.5–5.1)
RBC # BLD AUTO: 3.78 X10(6)/MCL (ref 4.7–6.1)
SODIUM SERPL-SCNC: 140 MMOL/L (ref 136–145)
WBC # SPEC AUTO: 5.19 X10(3)/MCL (ref 4.5–11.5)

## 2023-09-20 PROCEDURE — 25000003 PHARM REV CODE 250: Performed by: STUDENT IN AN ORGANIZED HEALTH CARE EDUCATION/TRAINING PROGRAM

## 2023-09-20 PROCEDURE — 80048 BASIC METABOLIC PNL TOTAL CA: CPT | Performed by: STUDENT IN AN ORGANIZED HEALTH CARE EDUCATION/TRAINING PROGRAM

## 2023-09-20 PROCEDURE — 27000207 HC ISOLATION

## 2023-09-20 PROCEDURE — 97110 THERAPEUTIC EXERCISES: CPT

## 2023-09-20 PROCEDURE — 94761 N-INVAS EAR/PLS OXIMETRY MLT: CPT

## 2023-09-20 PROCEDURE — 99900035 HC TECH TIME PER 15 MIN (STAT)

## 2023-09-20 PROCEDURE — 85025 COMPLETE CBC W/AUTO DIFF WBC: CPT | Performed by: STUDENT IN AN ORGANIZED HEALTH CARE EDUCATION/TRAINING PROGRAM

## 2023-09-20 PROCEDURE — 11000004 HC SNF PRIVATE

## 2023-09-20 PROCEDURE — 63600175 PHARM REV CODE 636 W HCPCS: Performed by: STUDENT IN AN ORGANIZED HEALTH CARE EDUCATION/TRAINING PROGRAM

## 2023-09-20 PROCEDURE — 25000003 PHARM REV CODE 250: Performed by: INTERNAL MEDICINE

## 2023-09-20 RX ADMIN — SODIUM CHLORIDE: 9 INJECTION, SOLUTION INTRAVENOUS at 04:09

## 2023-09-20 RX ADMIN — LINEZOLID 600 MG: 600 TABLET, FILM COATED ORAL at 09:09

## 2023-09-20 RX ADMIN — MULTIPLE VITAMINS W/ MINERALS TAB 1 TABLET: TAB at 09:09

## 2023-09-20 RX ADMIN — LINEZOLID 600 MG: 600 TABLET, FILM COATED ORAL at 08:09

## 2023-09-20 RX ADMIN — INSULIN DETEMIR 7 UNITS: 100 INJECTION, SOLUTION SUBCUTANEOUS at 08:09

## 2023-09-20 RX ADMIN — MEROPENEM 1 G: 1 INJECTION, POWDER, FOR SOLUTION INTRAVENOUS at 01:09

## 2023-09-20 RX ADMIN — MEROPENEM 1 G: 1 INJECTION, POWDER, FOR SOLUTION INTRAVENOUS at 08:09

## 2023-09-20 RX ADMIN — OXYCODONE HYDROCHLORIDE AND ACETAMINOPHEN 500 MG: 500 TABLET ORAL at 09:09

## 2023-09-20 RX ADMIN — MEROPENEM 1 G: 1 INJECTION, POWDER, FOR SOLUTION INTRAVENOUS at 04:09

## 2023-09-20 RX ADMIN — ASPIRIN 81 MG: 81 TABLET, COATED ORAL at 09:09

## 2023-09-20 RX ADMIN — CARVEDILOL 12.5 MG: 12.5 TABLET, FILM COATED ORAL at 05:09

## 2023-09-20 RX ADMIN — SODIUM CHLORIDE: 9 INJECTION, SOLUTION INTRAVENOUS at 01:09

## 2023-09-20 RX ADMIN — FERROUS SULFATE TAB 325 MG (65 MG ELEMENTAL FE) 1 EACH: 325 (65 FE) TAB at 09:09

## 2023-09-20 RX ADMIN — CARVEDILOL 12.5 MG: 12.5 TABLET, FILM COATED ORAL at 08:09

## 2023-09-20 RX ADMIN — INSULIN ASPART 7 UNITS: 100 INJECTION, SOLUTION INTRAVENOUS; SUBCUTANEOUS at 05:09

## 2023-09-20 RX ADMIN — SITAGLIPTIN 100 MG: 50 TABLET, FILM COATED ORAL at 09:09

## 2023-09-20 RX ADMIN — SODIUM CHLORIDE: 9 INJECTION, SOLUTION INTRAVENOUS at 08:09

## 2023-09-20 RX ADMIN — INSULIN ASPART 7 UNITS: 100 INJECTION, SOLUTION INTRAVENOUS; SUBCUTANEOUS at 11:09

## 2023-09-20 NOTE — PT/OT/SLP PROGRESS
Physical Therapy Treatment Note           Name: Antonio Galvan II    : 1965 (57 y.o.)  MRN: [id number]           TREATMENT SUMMARY AND RECOMMENDATIONS:    PT Received On: 23  PT Start Time: 1047     PT Stop Time: 1116  PT Total Time (min): 29 min     Subjective Assessment:   No complaints  Lethargic   x Awake, alert, cooperative  Uncooperative    Agitated  c/o pain    Appropriate  c/o fatigue    Confused x Treated at bedside     Emotionally labile  Treated in gym/dept.    Impulsive  Other:    Flat affect       Therapy Precautions:    Cognitive deficits  Spinal precautions    Collar - hard  Sternal precautions    Collar - soft   TLSO    Fall risk  LSO    Hip precautions - posterior  Knee immobilizer    Hip precautions - anterior  WBAT    Impaired communication  Partial weightbearing    Oxygen  TTWB    PEG tube  NWB    Visual deficits x Other: perineural catheter, PICC line, pressure relief boots     Hearing deficits  x Quadriplegia        Treatment Objectives:     Mobility Training:   Assist level Comments    Bed mobility     Transfer     Gait     Sit to stand transitions     Sitting balance     Standing balance      Wheelchair mobility     Car transfer     Other:          Therapeutic Exercise:   Exercise Sets Reps Comments   PROM to B UE and LE, all joints and digits   All functional planes to tolerance with sustained hold at end ranges                         Additional Comments:  Pt without new complications. Improved ROM demonstrated at B LE today. Less clonus in B UE.     Still awaiting wound care clearance to allow OOB or EOB mobility. Re-eval of wounds to be completed soon.     Assessment: Patient tolerated session well.      PT Plan: continue per POC  Revisions made to plan of care: No    GOALS:   Multidisciplinary Problems       Physical Therapy Goals          Problem: Physical Therapy    Goal Priority Disciplines Outcome Goal Variances Interventions   Physical Therapy Goal     PT,  PT/OT Ongoing, Progressing     Description: Goals to be met by: Discharge     Patient will increase functional independence with mobility by performin.  Pt to receive PROM BLEs and UEs 5-6 d/wk, qd,  to prevent further contractures and ensure ability for positioning in WC, along with proper positioning to reduce further skin break down  2.  Pt to be assessed for appropriateness for out of bed to WC - awaiting wound care                       Skilled PT Minutes Provided: 25   Communication with Treatment Team:     Equipment recommendations:       At end of treatment, patient remained:   Up in chair     Up in wheelchair in room   x In bed    With alarm activated   x Bed rails up   x Call bell in reach    x Family/friends present    Restraints secured properly    In bathroom with CNA/RN notified   x Nurse aware    In gym with therapist/tech    Other:
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
## 2023-09-21 LAB
POCT GLUCOSE: 119 MG/DL (ref 70–110)
POCT GLUCOSE: 142 MG/DL (ref 70–110)
POCT GLUCOSE: 151 MG/DL (ref 70–110)
POCT GLUCOSE: 82 MG/DL (ref 70–110)

## 2023-09-21 PROCEDURE — 97530 THERAPEUTIC ACTIVITIES: CPT

## 2023-09-21 PROCEDURE — 25000003 PHARM REV CODE 250: Performed by: STUDENT IN AN ORGANIZED HEALTH CARE EDUCATION/TRAINING PROGRAM

## 2023-09-21 PROCEDURE — 11000004 HC SNF PRIVATE

## 2023-09-21 PROCEDURE — 63600175 PHARM REV CODE 636 W HCPCS: Performed by: STUDENT IN AN ORGANIZED HEALTH CARE EDUCATION/TRAINING PROGRAM

## 2023-09-21 PROCEDURE — 25000003 PHARM REV CODE 250: Performed by: INTERNAL MEDICINE

## 2023-09-21 PROCEDURE — 94760 N-INVAS EAR/PLS OXIMETRY 1: CPT

## 2023-09-21 PROCEDURE — 27000207 HC ISOLATION

## 2023-09-21 PROCEDURE — 99900035 HC TECH TIME PER 15 MIN (STAT)

## 2023-09-21 RX ADMIN — OXYCODONE HYDROCHLORIDE AND ACETAMINOPHEN 500 MG: 500 TABLET ORAL at 09:09

## 2023-09-21 RX ADMIN — INSULIN ASPART 2 UNITS: 100 INJECTION, SOLUTION INTRAVENOUS; SUBCUTANEOUS at 12:09

## 2023-09-21 RX ADMIN — CARVEDILOL 12.5 MG: 12.5 TABLET, FILM COATED ORAL at 08:09

## 2023-09-21 RX ADMIN — FERROUS SULFATE TAB 325 MG (65 MG ELEMENTAL FE) 1 EACH: 325 (65 FE) TAB at 09:09

## 2023-09-21 RX ADMIN — SODIUM CHLORIDE: 9 INJECTION, SOLUTION INTRAVENOUS at 12:09

## 2023-09-21 RX ADMIN — SODIUM CHLORIDE: 9 INJECTION, SOLUTION INTRAVENOUS at 11:09

## 2023-09-21 RX ADMIN — MEROPENEM 1 G: 1 INJECTION, POWDER, FOR SOLUTION INTRAVENOUS at 04:09

## 2023-09-21 RX ADMIN — CARVEDILOL 12.5 MG: 12.5 TABLET, FILM COATED ORAL at 05:09

## 2023-09-21 RX ADMIN — MEROPENEM 1 G: 1 INJECTION, POWDER, FOR SOLUTION INTRAVENOUS at 12:09

## 2023-09-21 RX ADMIN — MULTIPLE VITAMINS W/ MINERALS TAB 1 TABLET: TAB at 09:09

## 2023-09-21 RX ADMIN — SITAGLIPTIN 100 MG: 50 TABLET, FILM COATED ORAL at 09:09

## 2023-09-21 RX ADMIN — LINEZOLID 600 MG: 600 TABLET, FILM COATED ORAL at 09:09

## 2023-09-21 RX ADMIN — LINEZOLID 600 MG: 600 TABLET, FILM COATED ORAL at 10:09

## 2023-09-21 RX ADMIN — ASPIRIN 81 MG: 81 TABLET, COATED ORAL at 09:09

## 2023-09-21 RX ADMIN — MEROPENEM 1 G: 1 INJECTION, POWDER, FOR SOLUTION INTRAVENOUS at 11:09

## 2023-09-21 RX ADMIN — INSULIN DETEMIR 7 UNITS: 100 INJECTION, SOLUTION SUBCUTANEOUS at 11:09

## 2023-09-21 NOTE — PLAN OF CARE
Problem: Physical Therapy  Goal: Physical Therapy Goal  Description: Goals to be met by: Discharge     Patient will increase functional independence with mobility by performin.  Pt to receive PROM BLEs and UEs 5-6 d/wk, qd,  to prevent further contractures and ensure ability for positioning in WC, along with proper positioning to reduce further skin break down  2.  Pt to tolerate sitting EOB x 30 min with normal BP to be demonstrated in preparation for progression to OOB>WC  Outcome: Ongoing, Progressing

## 2023-09-21 NOTE — NURSING
Nurses Note -- 4 Eyes      9/21/2023   5:50 AM      Skin assessed during: Daily Assessment      [] No Altered Skin Integrity Present    []Prevention Measures Documented      [x] Yes- Altered Skin Integrity Present or Discovered   [x] LDA Added if Not in Epic (Describe Wound)   [x] New Altered Skin Integrity was Present on Admit and Documented in LDA   [x] Wound Image Taken    Wound Care Consulted? Yes    Attending Nurse:  Christa Montano RN/Staff Member:  Shivam MADDOX
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
## 2023-09-21 NOTE — PLAN OF CARE
Weekly Staffing Report      Date Admitted: 8/23/2023 :   Staffing Date: 9/21/2023     Patient Active Problem List   Diagnosis    Cardiac arrest    Uncontrolled type 2 diabetes mellitus with hyperglycemia    Atrial flutter    Severe sepsis    Acute hypoxemic respiratory failure    Constipation    Abnormal urinalysis    Obstructive sleep apnea syndrome    Acute deep vein thrombosis (DVT) of brachial vein of right upper extremity    Quadriplegia    Intolerance of continuous positive airway pressure (CPAP) ventilation    Complex sleep apnea syndrome    Open wound of left hip    Pressure injury of right ischium, stage 4    Osteomyelitis    Sacral decubitus ulcer, stage II    Chronic blood loss anemia          Team Members Present:       Nursing Present     Yes [x]    No []    Physical Therapy Present    Yes [x]    No []    Occupational Therapy Present     Yes [x]    No []    Speech Therapy Present    Yes []    No []    NA [x]    Dietary Present    Yes [x]    No []        Physician Present   [] Dm Acevedo    [x] Thairy Reyes    [] SHRAVAN Soares    [] SIDRA Donohue     [] SHAWN Avila      Family Present    [] Adult Children    [] Spouse    [] POA    [] Friend/ Caregiver    [x] Other Mother present       Interdisciplinary Meeting Notes:  PT- ROM exercises. Appetite good. Last dose of abx 09/26. Family teaching for wound care. Medically- no acute issues. All questions answered at this time.                 Please see Documented PT/OT/ST, Dietary, Nursing, and Physician notes for detailed treatment information.

## 2023-09-21 NOTE — PLAN OF CARE
Problem: Adult Inpatient Plan of Care  Goal: Plan of Care Review  Outcome: Ongoing, Progressing  Flowsheets (Taken 9/21/2023 1636)  Plan of Care Reviewed With:   patient   caregiver  Goal: Patient-Specific Goal (Individualized)  Outcome: Ongoing, Progressing  Flowsheets (Taken 9/21/2023 1636)  Anxieties, Fears or Concerns: would like to sit edge of bed/ prepare to go home after abt complete  Individualized Care Needs: sat edge of bed w/therapy, drsgs monitored for bleeding, bp monitored  Goal: Absence of Hospital-Acquired Illness or Injury  Outcome: Ongoing, Progressing  Intervention: Identify and Manage Fall Risk  Flowsheets (Taken 9/21/2023 1636)  Safety Promotion/Fall Prevention:   assistive device/personal item within reach   family to remain at bedside   room near unit station   instructed to call staff for mobility  Intervention: Prevent Skin Injury  Flowsheets (Taken 9/21/2023 1636)  Body Position:   turned   sitting up in bed  Skin Protection:   adhesive use limited   incontinence pads utilized   tubing/devices free from skin contact  Intervention: Prevent and Manage VTE (Venous Thromboembolism) Risk  Flowsheets (Taken 9/21/2023 1636)  Activity Management:   Rolling - L1   Sitting at edge of bed - L2  VTE Prevention/Management:   bleeding precations maintained   bleeding risk assessed   ROM (passive) performed  Range of Motion: ROM (range of motion) performed  Intervention: Prevent Infection  Flowsheets (Taken 9/21/2023 1636)  Infection Prevention:   hand hygiene promoted   equipment surfaces disinfected   environmental surveillance performed   personal protective equipment utilized   single patient room provided  Goal: Optimal Comfort and Wellbeing  Outcome: Ongoing, Progressing  Intervention: Monitor Pain and Promote Comfort  Flowsheets (Taken 9/21/2023 1636)  Pain Management Interventions:   care clustered   pain management plan reviewed with patient/caregiver   pillow support provided   position  adjusted   relaxation techniques promoted  Intervention: Provide Person-Centered Care  Flowsheets (Taken 9/21/2023 1636)  Trust Relationship/Rapport:   care explained   choices provided   questions answered  Goal: Readiness for Transition of Care  Outcome: Ongoing, Progressing     Problem: Diabetes Comorbidity  Goal: Blood Glucose Level Within Targeted Range  Outcome: Ongoing, Progressing  Intervention: Monitor and Manage Glycemia  Flowsheets (Taken 9/21/2023 1636)  Glycemic Management:   blood glucose monitored   supplemental insulin given     Problem: Adjustment to Illness (Sepsis/Septic Shock)  Goal: Optimal Coping  Outcome: Ongoing, Progressing     Problem: Bleeding (Sepsis/Septic Shock)  Goal: Absence of Bleeding  Outcome: Ongoing, Progressing     Problem: Glycemic Control Impaired (Sepsis/Septic Shock)  Goal: Blood Glucose Level Within Desired Range  Outcome: Ongoing, Progressing     Problem: Infection Progression (Sepsis/Septic Shock)  Goal: Absence of Infection Signs and Symptoms  Outcome: Ongoing, Progressing  Intervention: Promote Recovery  Flowsheets (Taken 9/21/2023 1636)  Activity Management:   Rolling - L1   Sitting at edge of bed - L2     Problem: Nutrition Impaired (Sepsis/Septic Shock)  Goal: Optimal Nutrition Intake  Outcome: Ongoing, Progressing     Problem: Infection  Goal: Absence of Infection Signs and Symptoms  Outcome: Ongoing, Progressing     Problem: Impaired Wound Healing  Goal: Optimal Wound Healing  Outcome: Ongoing, Progressing     Problem: Skin Injury Risk Increased  Goal: Skin Health and Integrity  Outcome: Ongoing, Progressing     Problem: Fall Injury Risk  Goal: Absence of Fall and Fall-Related Injury  Outcome: Ongoing, Progressing

## 2023-09-21 NOTE — PT/OT/SLP PROGRESS
Physical Therapy Treatment Note           Name: Antonio Galvan II    : 1965 (57 y.o.)  MRN: [id number]           TREATMENT SUMMARY AND RECOMMENDATIONS:    PT Received On: 23  PT Start Time: 1440     PT Stop Time: 1514  PT Total Time (min): 34 min     Subjective Assessment:   No complaints  Lethargic   x Awake, alert, cooperative  Uncooperative    Agitated  c/o pain    Appropriate  c/o fatigue    Confused x Treated at bedside     Emotionally labile  Treated in gym/dept.    Impulsive  Other:    Flat affect       Therapy Precautions:    Cognitive deficits  Spinal precautions    Collar - hard  Sternal precautions    Collar - soft   TLSO    Fall risk  LSO    Hip precautions - posterior  Knee immobilizer    Hip precautions - anterior  WBAT    Impaired communication  Partial weightbearing    Oxygen  TTWB    PEG tube  NWB    Visual deficits x Other:Contact precautions, B greater trochanter wounds    Hearing deficits  x Quadriplegia        Treatment Objectives:     Mobility Training:   Assist level Comments    Bed mobility Total A Supine<>sit   Transfer     Gait     Sit to stand transitions     Sitting balance MIN A X 25 min EOB - PT blocking/holding at B knee- due to tone in hips/trunk pt is able to maintain sitting posture    Standing balance      Wheelchair mobility     Car transfer     Other:          Therapeutic Exercise:   Exercise Sets Reps Comments                               Additional Comments:   BP: supine: 166/103 and 164/91, sittin/102, 144/95, 112/72. Back to supine: 175/99 - not symptomatic. No issues at wound sites. Was able to tolerate eating a bowl of soup while sitting    Assessment: Patient tolerated session well, very happy to sit EOB.    PT Plan: continue per POC  Revisions made to plan of care: Yes, updated since wound care ok'd sitting EOB    GOALS:   Multidisciplinary Problems       Physical Therapy Goals          Problem: Physical Therapy    Goal Priority Disciplines  Outcome Goal Variances Interventions   Physical Therapy Goal     PT, PT/OT Ongoing, Progressing     Description: Goals to be met by: Discharge     Patient will increase functional independence with mobility by performin.  Pt to receive PROM BLEs and UEs 5-6 d/wk, qd,  to prevent further contractures and ensure ability for positioning in WC, along with proper positioning to reduce further skin break down  2.  Pt to tolerate sitting EOB x 30 min with normal BP to be demonstrated in preparation for progression to OOB>WC                       Skilled PT Minutes Provided: 30   Communication with Treatment Team:     Equipment recommendations:       At end of treatment, patient remained:   Up in chair     Up in wheelchair in room   x In bed   x With alarm activated   x Bed rails up   x Call bell in reach    x Family/friends present    Restraints secured properly    In bathroom with CNA/RN notified   x Nurse aware    In gym with therapist/tech    Other:

## 2023-09-21 NOTE — PLAN OF CARE
Problem: Adult Inpatient Plan of Care  Goal: Plan of Care Review  Outcome: Ongoing, Progressing  Flowsheets (Taken 9/20/2023 1918)  Plan of Care Reviewed With:   patient   caregiver  Goal: Patient-Specific Goal (Individualized)  Outcome: Ongoing, Progressing  Flowsheets (Taken 9/20/2023 1918)  Anxieties, Fears or Concerns: patient would like to start sitting up on the side of the bed, wound care nurse spoke to patient today  Individualized Care Needs: will talk to MD and therapy about sitting up on the side of the bed and monitoring bp at that time and wounds  Goal: Absence of Hospital-Acquired Illness or Injury  Outcome: Ongoing, Progressing  Intervention: Identify and Manage Fall Risk  Flowsheets (Taken 9/20/2023 1918)  Safety Promotion/Fall Prevention:   assistive device/personal item within reach   family to remain at bedside   instructed to call staff for mobility  Intervention: Prevent Skin Injury  Flowsheets (Taken 9/20/2023 1918)  Body Position:   turned   sitting up in bed  Skin Protection:   adhesive use limited   incontinence pads utilized   tubing/devices free from skin contact  Intervention: Prevent and Manage VTE (Venous Thromboembolism) Risk  Flowsheets (Taken 9/20/2023 1918)  Activity Management: Rolling - L1  VTE Prevention/Management:   bleeding risk assessed   bleeding precations maintained  Range of Motion: ROM (range of motion) performed  Intervention: Prevent Infection  Flowsheets (Taken 9/20/2023 1918)  Infection Prevention:   hand hygiene promoted   single patient room provided   personal protective equipment utilized   equipment surfaces disinfected   environmental surveillance performed  Goal: Optimal Comfort and Wellbeing  Outcome: Ongoing, Progressing  Intervention: Monitor Pain and Promote Comfort  Flowsheets (Taken 9/20/2023 1918)  Pain Management Interventions:   care clustered   pain management plan reviewed with patient/caregiver   position adjusted   pillow support provided    relaxation techniques promoted  Intervention: Provide Person-Centered Care  Flowsheets (Taken 9/20/2023 1918)  Trust Relationship/Rapport:   care explained   choices provided   questions encouraged  Goal: Readiness for Transition of Care  Outcome: Ongoing, Progressing     Problem: Diabetes Comorbidity  Goal: Blood Glucose Level Within Targeted Range  Outcome: Ongoing, Progressing     Problem: Adjustment to Illness (Sepsis/Septic Shock)  Goal: Optimal Coping  Outcome: Ongoing, Progressing     Problem: Bleeding (Sepsis/Septic Shock)  Goal: Absence of Bleeding  Outcome: Ongoing, Progressing  Intervention: Monitor and Manage Bleeding  Flowsheets (Taken 9/20/2023 1918)  Bleeding Precautions: blood pressure closely monitored  Bleeding Management: dressing monitored     Problem: Glycemic Control Impaired (Sepsis/Septic Shock)  Goal: Blood Glucose Level Within Desired Range  Outcome: Ongoing, Progressing  Intervention: Optimize Glycemic Control  Flowsheets (Taken 9/20/2023 1918)  Glycemic Management:   blood glucose monitored   supplemental insulin given     Problem: Infection Progression (Sepsis/Septic Shock)  Goal: Absence of Infection Signs and Symptoms  Outcome: Ongoing, Progressing     Problem: Nutrition Impaired (Sepsis/Septic Shock)  Goal: Optimal Nutrition Intake  Outcome: Ongoing, Progressing     Problem: Infection  Goal: Absence of Infection Signs and Symptoms  Outcome: Ongoing, Progressing     Problem: Impaired Wound Healing  Goal: Optimal Wound Healing  Outcome: Ongoing, Progressing     Problem: Skin Injury Risk Increased  Goal: Skin Health and Integrity  Outcome: Ongoing, Progressing     Problem: Fall Injury Risk  Goal: Absence of Fall and Fall-Related Injury  Outcome: Ongoing, Progressing

## 2023-09-22 LAB
POCT GLUCOSE: 106 MG/DL (ref 70–110)
POCT GLUCOSE: 157 MG/DL (ref 70–110)

## 2023-09-22 PROCEDURE — 25000003 PHARM REV CODE 250: Performed by: INTERNAL MEDICINE

## 2023-09-22 PROCEDURE — 97530 THERAPEUTIC ACTIVITIES: CPT

## 2023-09-22 PROCEDURE — 27000207 HC ISOLATION

## 2023-09-22 PROCEDURE — 25000003 PHARM REV CODE 250: Performed by: STUDENT IN AN ORGANIZED HEALTH CARE EDUCATION/TRAINING PROGRAM

## 2023-09-22 PROCEDURE — 11000004 HC SNF PRIVATE

## 2023-09-22 PROCEDURE — 63600175 PHARM REV CODE 636 W HCPCS: Performed by: STUDENT IN AN ORGANIZED HEALTH CARE EDUCATION/TRAINING PROGRAM

## 2023-09-22 PROCEDURE — 94760 N-INVAS EAR/PLS OXIMETRY 1: CPT

## 2023-09-22 RX ADMIN — CARVEDILOL 12.5 MG: 12.5 TABLET, FILM COATED ORAL at 05:09

## 2023-09-22 RX ADMIN — MEROPENEM 1 G: 1 INJECTION, POWDER, FOR SOLUTION INTRAVENOUS at 05:09

## 2023-09-22 RX ADMIN — SITAGLIPTIN 100 MG: 50 TABLET, FILM COATED ORAL at 09:09

## 2023-09-22 RX ADMIN — SODIUM CHLORIDE: 9 INJECTION, SOLUTION INTRAVENOUS at 12:09

## 2023-09-22 RX ADMIN — SODIUM CHLORIDE: 9 INJECTION, SOLUTION INTRAVENOUS at 05:09

## 2023-09-22 RX ADMIN — MEROPENEM 1 G: 1 INJECTION, POWDER, FOR SOLUTION INTRAVENOUS at 09:09

## 2023-09-22 RX ADMIN — OXYCODONE HYDROCHLORIDE AND ACETAMINOPHEN 500 MG: 500 TABLET ORAL at 09:09

## 2023-09-22 RX ADMIN — MULTIPLE VITAMINS W/ MINERALS TAB 1 TABLET: TAB at 09:09

## 2023-09-22 RX ADMIN — HYDROCODONE BITARTRATE AND ACETAMINOPHEN 1 TABLET: 10; 325 TABLET ORAL at 10:09

## 2023-09-22 RX ADMIN — SODIUM CHLORIDE 5 ML/HR: 9 INJECTION, SOLUTION INTRAVENOUS at 09:09

## 2023-09-22 RX ADMIN — HYDROCODONE BITARTRATE AND ACETAMINOPHEN 1 TABLET: 10; 325 TABLET ORAL at 12:09

## 2023-09-22 RX ADMIN — ASPIRIN 81 MG: 81 TABLET, COATED ORAL at 09:09

## 2023-09-22 RX ADMIN — FERROUS SULFATE TAB 325 MG (65 MG ELEMENTAL FE) 1 EACH: 325 (65 FE) TAB at 09:09

## 2023-09-22 RX ADMIN — LINEZOLID 600 MG: 600 TABLET, FILM COATED ORAL at 08:09

## 2023-09-22 RX ADMIN — INSULIN DETEMIR 7 UNITS: 100 INJECTION, SOLUTION SUBCUTANEOUS at 09:09

## 2023-09-22 RX ADMIN — CARVEDILOL 12.5 MG: 12.5 TABLET, FILM COATED ORAL at 09:09

## 2023-09-22 RX ADMIN — MEROPENEM 1 G: 1 INJECTION, POWDER, FOR SOLUTION INTRAVENOUS at 12:09

## 2023-09-22 RX ADMIN — LINEZOLID 600 MG: 600 TABLET, FILM COATED ORAL at 09:09

## 2023-09-22 NOTE — PT/OT/SLP PROGRESS
Physical Therapy Treatment Note           Name: Antonio Galvan II    : 1965 (57 y.o.)  MRN: [id number]           TREATMENT SUMMARY AND RECOMMENDATIONS:    PT Received On: 23  PT Start Time: 1105     PT Stop Time: 1140  PT Total Time (min): 35 min     Subjective Assessment:   No complaints  Lethargic   x Awake, alert, cooperative  Uncooperative    Agitated  c/o pain    Appropriate  c/o fatigue    Confused x Treated at bedside     Emotionally labile  Treated in gym/dept.    Impulsive  Other:    Flat affect       Therapy Precautions:    Cognitive deficits  Spinal precautions    Collar - hard  Sternal precautions    Collar - soft   TLSO    Fall risk  LSO    Hip precautions - posterior  Knee immobilizer    Hip precautions - anterior  WBAT    Impaired communication  Partial weightbearing    Oxygen  TTWB    PEG tube  NWB    Visual deficits x Other:Contact precautions, B greater trochanter wounds    Hearing deficits  x Quadriplegia        Treatment Objectives:     Mobility Training:   Assist level Comments    Bed mobility Total A Supine<>sit and rolling side to side   Transfer     Gait     Sit to stand transitions     Sitting balance MIN A X 25 min EOB - PT blocking/holding at B knee- due to tone in hips/trunk pt is able to maintain sitting posture    Standing balance      Wheelchair mobility     Car transfer     Other:          Therapeutic Exercise:   Exercise Sets Reps Comments                               Additional Comments:   BP: supine: 204/120, 186/119, and 166/105, sittin/97, 174/91, 124/77, 124/88.  . No issues at wound sites. Was able to tolerate well. BP regulates better in sitting. Small BM while sitting, PT and mother assisted in cleaning post tx. Proper positioning at tx end.     Assessment: Patient tolerated session well, very happy to sit EOB.    PT Plan: continue per POC  Revisions made to plan of care: No    GOALS:   Multidisciplinary Problems       Physical Therapy Goals           Problem: Physical Therapy    Goal Priority Disciplines Outcome Goal Variances Interventions   Physical Therapy Goal     PT, PT/OT Ongoing, Progressing     Description: Goals to be met by: Discharge     Patient will increase functional independence with mobility by performin.  Pt to receive PROM BLEs and UEs 5-6 d/wk, qd,  to prevent further contractures and ensure ability for positioning in WC, along with proper positioning to reduce further skin break down  2.  Pt to tolerate sitting EOB x 30 min with normal BP to be demonstrated in preparation for progression to OOB>WC                       Skilled PT Minutes Provided: 35   Communication with Treatment Team:     Equipment recommendations:       At end of treatment, patient remained:   Up in chair     Up in wheelchair in room   x In bed   x With alarm activated   x Bed rails up   x Call bell in reach    x Family/friends present    Restraints secured properly    In bathroom with CNA/RN notified    Nurse aware    In gym with therapist/tech    Other:

## 2023-09-22 NOTE — PLAN OF CARE
[facility]  Discharge Reassessment    Primary Care Provider: Jennifer Fernando NP    Expected Discharge Date:     Reassessment (most recent)       Discharge Reassessment - 09/22/23 1816          Discharge Reassessment    Assessment Type Discharge Planning Reassessment     Did the patient's condition or plan change since previous assessment? No     Discharge Plan discussed with: Parent(s)     Name(s) and Number(s) Judy mother     Communicated SADE with patient/caregiver Date not available/Unable to determine     Discharge Plan A Home with family;Home Health     DME Needed Upon Discharge  none     Transition of Care Barriers None     Why the patient remains in the hospital Requires continued medical care        Post-Acute Status    Post-Acute Authorization Home Health     Home Health Status Pending medical clearance/testing     Hospital Resources/Appts/Education Provided Provided patient/caregiver with written discharge plan information     Discharge Delays None known at this time

## 2023-09-22 NOTE — PLAN OF CARE
Problem: Adult Inpatient Plan of Care  Goal: Plan of Care Review  9/22/2023 0737 by Consuelo Aburto LPN  Outcome: Ongoing, Progressing  Flowsheets (Taken 9/22/2023 0737)  Plan of Care Reviewed With:   patient   caregiver  9/22/2023 0736 by Consuelo Aburto LPN  Outcome: Ongoing, Progressing  Goal: Patient-Specific Goal (Individualized)  9/22/2023 0737 by Consuelo Aburto LPN  Outcome: Ongoing, Progressing  Flowsheets (Taken 9/22/2023 0737)  Anxieties, Fears or Concerns: none at this time  Individualized Care Needs: Monitor dressings for bleeding, monitor blood glucose, monitor BP  9/22/2023 0736 by Consuelo Aburto LPN  Outcome: Ongoing, Progressing  Goal: Absence of Hospital-Acquired Illness or Injury  9/22/2023 0737 by Consuelo Aburto LPN  Outcome: Ongoing, Progressing  9/22/2023 0736 by Consuelo Aburto LPN  Outcome: Ongoing, Progressing  Intervention: Prevent and Manage VTE (Venous Thromboembolism) Risk  Flowsheets (Taken 9/21/2023 2000)  VTE Prevention/Management:   bleeding precations maintained   bleeding risk assessed   dorsiflexion/plantar flexion performed   fluids promoted   ROM (passive) performed  Intervention: Prevent Infection  Flowsheets (Taken 9/22/2023 0737)  Infection Prevention:   hand hygiene promoted   personal protective equipment utilized   rest/sleep promoted   single patient room provided  Goal: Optimal Comfort and Wellbeing  9/22/2023 0737 by Consuelo Aburto LPN  Outcome: Ongoing, Progressing  9/22/2023 0736 by Consuelo Aburto LPN  Outcome: Ongoing, Progressing  Intervention: Monitor Pain and Promote Comfort  Flowsheets (Taken 9/21/2023 2000)  Pain Management Interventions:   care clustered   diversional activity provided   pain management plan reviewed with patient/caregiver   pillow support provided   position adjusted   quiet environment facilitated   relaxation techniques promoted     Problem: Diabetes Comorbidity  Goal: Blood Glucose Level Within Targeted Range  Outcome: Ongoing,  Progressing  Intervention: Monitor and Manage Glycemia  Flowsheets (Taken 9/22/2023 7731)  Glycemic Management:   blood glucose monitored   oral hydration promoted
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
## 2023-09-22 NOTE — PLAN OF CARE
Problem: Adult Inpatient Plan of Care  Goal: Plan of Care Review  Outcome: Ongoing, Progressing  Flowsheets (Taken 9/22/2023 1549)  Plan of Care Reviewed With:   patient   mother  Goal: Patient-Specific Goal (Individualized)  Outcome: Ongoing, Progressing  Flowsheets (Taken 9/22/2023 1549)  Anxieties, Fears or Concerns: none at this time  Individualized Care Needs: monitor dressings for bleeding, CBG checks BID, monitor BP, watch for s/s of pain  Goal: Absence of Hospital-Acquired Illness or Injury  Outcome: Ongoing, Progressing  Intervention: Identify and Manage Fall Risk  Flowsheets (Taken 9/22/2023 1549)  Safety Promotion/Fall Prevention:   assistive device/personal item within reach   bed alarm set   instructed to call staff for mobility   side rails raised x 3   nonskid shoes/socks when out of bed   family to remain at bedside   room near unit station  Intervention: Prevent Skin Injury  Flowsheets (Taken 9/22/2023 1549)  Body Position: sitting up in bed  Skin Protection:   adhesive use limited   incontinence pads utilized   tubing/devices free from skin contact   transparent dressing maintained  Intervention: Prevent and Manage VTE (Venous Thromboembolism) Risk  Flowsheets (Taken 9/22/2023 1549)  Activity Management: Rolling - L1  VTE Prevention/Management:   bleeding precations maintained   fluids promoted  Range of Motion: active ROM (range of motion) encouraged  Intervention: Prevent Infection  Flowsheets (Taken 9/22/2023 1549)  Infection Prevention:   hand hygiene promoted   equipment surfaces disinfected   personal protective equipment utilized   cohorting utilized  Goal: Optimal Comfort and Wellbeing  Outcome: Ongoing, Progressing  Intervention: Monitor Pain and Promote Comfort  Flowsheets (Taken 9/22/2023 1549)  Pain Management Interventions:   care clustered   pain management plan reviewed with patient/caregiver   quiet environment facilitated   relaxation techniques promoted  Intervention: Provide  Person-Centered Care  Flowsheets (Taken 9/22/2023 1549)  Trust Relationship/Rapport:   care explained   choices provided   questions encouraged  Goal: Readiness for Transition of Care  Outcome: Ongoing, Progressing  Intervention: Mutually Develop Transition Plan  Flowsheets (Taken 9/22/2023 1549)  Communicated SADE with patient/caregiver: Date not available/Unable to determine     Problem: Diabetes Comorbidity  Goal: Blood Glucose Level Within Targeted Range  Outcome: Ongoing, Progressing  Intervention: Monitor and Manage Glycemia  Flowsheets (Taken 9/22/2023 1549)  Glycemic Management:   blood glucose monitored   oral hydration promoted   supplemental insulin given     Problem: Adjustment to Illness (Sepsis/Septic Shock)  Goal: Optimal Coping  Outcome: Ongoing, Progressing  Intervention: Optimize Psychosocial Adjustment to Illness  Flowsheets (Taken 9/22/2023 1549)  Supportive Measures:   active listening utilized   verbalization of feelings encouraged  Family/Support System Care: self-care encouraged     Problem: Bleeding (Sepsis/Septic Shock)  Goal: Absence of Bleeding  Outcome: Ongoing, Progressing  Intervention: Monitor and Manage Bleeding  Flowsheets (Taken 9/22/2023 1549)  Bleeding Precautions:   blood pressure closely monitored   monitored for signs of bleeding  Bleeding Management: dressing monitored     Problem: Glycemic Control Impaired (Sepsis/Septic Shock)  Goal: Blood Glucose Level Within Desired Range  Outcome: Ongoing, Progressing  Intervention: Optimize Glycemic Control  Flowsheets (Taken 9/22/2023 1549)  Glycemic Management:   blood glucose monitored   oral hydration promoted   supplemental insulin given     Problem: Infection Progression (Sepsis/Septic Shock)  Goal: Absence of Infection Signs and Symptoms  Outcome: Ongoing, Progressing  Intervention: Initiate Sepsis Management  Flowsheets (Taken 9/22/2023 1549)  Infection Prevention:   hand hygiene promoted   equipment surfaces disinfected    personal protective equipment utilized   cohorting utilized  Infection Management: aseptic technique maintained  Stabilization Measures: legs elevated  Isolation Precautions:   contact   precautions maintained  Intervention: Promote Stabilization  Flowsheets (Taken 9/22/2023 1549)  Fever Reduction/Comfort Measures:   lightweight bedding   lightweight clothing  Fluid/Electrolyte Management: fluids provided  Intervention: Promote Recovery  Flowsheets (Taken 9/22/2023 1549)  Sleep/Rest Enhancement:   awakenings minimized   regular sleep/rest pattern promoted   relaxation techniques promoted  Airway/Ventilation Support: comfort measures provided  Activity Management: Rolling - L1     Problem: Nutrition Impaired (Sepsis/Septic Shock)  Goal: Optimal Nutrition Intake  Outcome: Ongoing, Progressing     Problem: Infection  Goal: Absence of Infection Signs and Symptoms  Outcome: Ongoing, Progressing  Intervention: Prevent or Manage Infection  Flowsheets (Taken 9/22/2023 1549)  Fever Reduction/Comfort Measures:   lightweight bedding   lightweight clothing  Infection Management: aseptic technique maintained  Isolation Precautions:   contact   precautions maintained     Problem: Impaired Wound Healing  Goal: Optimal Wound Healing  Outcome: Ongoing, Progressing  Intervention: Promote Wound Healing  Flowsheets (Taken 9/22/2023 1549)  Oral Nutrition Promotion:   calorie-dense foods provided   calorie-dense liquids provided   physical activity promoted   rest periods promoted   safe use of adaptive equipment encouraged  Sleep/Rest Enhancement:   awakenings minimized   regular sleep/rest pattern promoted   relaxation techniques promoted  Activity Management: Rolling - L1  Pain Management Interventions:   care clustered   pain management plan reviewed with patient/caregiver   quiet environment facilitated   relaxation techniques promoted     Problem: Skin Injury Risk Increased  Goal: Skin Health and Integrity  Outcome: Ongoing,  Progressing  Intervention: Optimize Skin Protection  Flowsheets (Taken 9/22/2023 1549)  Pressure Reduction Techniques:   frequent weight shift encouraged   heels elevated off bed   positioned off wounds   pressure points protected   weight shift assistance provided   rest period provided between sit times  Pressure Reduction Devices:   foam padding utilized   positioning supports utilized   heel offloading device utilized   pressure-redistributing mattress utilized  Skin Protection:   adhesive use limited   incontinence pads utilized   tubing/devices free from skin contact   transparent dressing maintained  Head of Bed (HOB) Positioning: HOB elevated  Intervention: Promote and Optimize Oral Intake  Flowsheets (Taken 9/22/2023 1549)  Oral Nutrition Promotion:   calorie-dense foods provided   calorie-dense liquids provided   physical activity promoted   rest periods promoted   safe use of adaptive equipment encouraged     Problem: Fall Injury Risk  Goal: Absence of Fall and Fall-Related Injury  Outcome: Ongoing, Progressing  Intervention: Identify and Manage Contributors  Flowsheets (Taken 9/22/2023 1549)  Self-Care Promotion:   independence encouraged   safe use of adaptive equipment encouraged   meal set-up provided  Medication Review/Management: medications reviewed  Intervention: Promote Injury-Free Environment  Flowsheets (Taken 9/22/2023 1549)  Safety Promotion/Fall Prevention:   assistive device/personal item within reach   bed alarm set   instructed to call staff for mobility   side rails raised x 3   nonskid shoes/socks when out of bed   family to remain at bedside   room near unit station

## 2023-09-23 LAB
POCT GLUCOSE: 117 MG/DL (ref 70–110)
POCT GLUCOSE: 229 MG/DL (ref 70–110)

## 2023-09-23 PROCEDURE — 63600175 PHARM REV CODE 636 W HCPCS: Performed by: STUDENT IN AN ORGANIZED HEALTH CARE EDUCATION/TRAINING PROGRAM

## 2023-09-23 PROCEDURE — 94760 N-INVAS EAR/PLS OXIMETRY 1: CPT

## 2023-09-23 PROCEDURE — 27000207 HC ISOLATION

## 2023-09-23 PROCEDURE — 25000003 PHARM REV CODE 250: Performed by: STUDENT IN AN ORGANIZED HEALTH CARE EDUCATION/TRAINING PROGRAM

## 2023-09-23 PROCEDURE — 25000003 PHARM REV CODE 250: Performed by: INTERNAL MEDICINE

## 2023-09-23 PROCEDURE — 11000004 HC SNF PRIVATE

## 2023-09-23 RX ADMIN — MULTIPLE VITAMINS W/ MINERALS TAB 1 TABLET: TAB at 10:09

## 2023-09-23 RX ADMIN — MEROPENEM 1 G: 1 INJECTION, POWDER, FOR SOLUTION INTRAVENOUS at 04:09

## 2023-09-23 RX ADMIN — SODIUM CHLORIDE 35 ML: 9 INJECTION, SOLUTION INTRAVENOUS at 09:09

## 2023-09-23 RX ADMIN — SITAGLIPTIN 100 MG: 50 TABLET, FILM COATED ORAL at 10:09

## 2023-09-23 RX ADMIN — MEROPENEM 1 G: 1 INJECTION, POWDER, FOR SOLUTION INTRAVENOUS at 01:09

## 2023-09-23 RX ADMIN — OXYCODONE HYDROCHLORIDE AND ACETAMINOPHEN 500 MG: 500 TABLET ORAL at 10:09

## 2023-09-23 RX ADMIN — LINEZOLID 600 MG: 600 TABLET, FILM COATED ORAL at 10:09

## 2023-09-23 RX ADMIN — MEROPENEM 1 G: 1 INJECTION, POWDER, FOR SOLUTION INTRAVENOUS at 09:09

## 2023-09-23 RX ADMIN — CARVEDILOL 12.5 MG: 12.5 TABLET, FILM COATED ORAL at 04:09

## 2023-09-23 RX ADMIN — SODIUM CHLORIDE 5 ML/HR: 9 INJECTION, SOLUTION INTRAVENOUS at 04:09

## 2023-09-23 RX ADMIN — ASPIRIN 81 MG: 81 TABLET, COATED ORAL at 10:09

## 2023-09-23 RX ADMIN — LINEZOLID 600 MG: 600 TABLET, FILM COATED ORAL at 11:09

## 2023-09-23 RX ADMIN — INSULIN ASPART 4 UNITS: 100 INJECTION, SOLUTION INTRAVENOUS; SUBCUTANEOUS at 05:09

## 2023-09-23 RX ADMIN — CARVEDILOL 12.5 MG: 12.5 TABLET, FILM COATED ORAL at 10:09

## 2023-09-23 RX ADMIN — INSULIN DETEMIR 7 UNITS: 100 INJECTION, SOLUTION SUBCUTANEOUS at 09:09

## 2023-09-23 RX ADMIN — HYDROCODONE BITARTRATE AND ACETAMINOPHEN 1 TABLET: 10; 325 TABLET ORAL at 04:09

## 2023-09-23 RX ADMIN — HYDROCODONE BITARTRATE AND ACETAMINOPHEN 1 TABLET: 10; 325 TABLET ORAL at 11:09

## 2023-09-23 RX ADMIN — FERROUS SULFATE TAB 325 MG (65 MG ELEMENTAL FE) 1 EACH: 325 (65 FE) TAB at 10:09

## 2023-09-23 NOTE — PLAN OF CARE
Problem: Infection  Goal: Absence of Infection Signs and Symptoms  Outcome: Ongoing, Progressing  Intervention: Prevent or Manage Infection  Flowsheets (Taken 9/22/2023 2050)  Fever Reduction/Comfort Measures:   lightweight bedding   lightweight clothing  Isolation Precautions:   contact   precautions maintained     Problem: Skin Injury Risk Increased  Goal: Skin Health and Integrity  Outcome: Ongoing, Progressing  Intervention: Optimize Skin Protection  Flowsheets (Taken 9/22/2023 2050)  Pressure Reduction Techniques: frequent weight shift encouraged  Skin Protection:   incontinence pads utilized   protective footwear used  Head of Bed (HOB) Positioning: HOB at 20-30 degrees  Intervention: Promote and Optimize Oral Intake  Flowsheets (Taken 9/22/2023 2050)  Oral Nutrition Promotion:   medicated   rest periods promoted
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
## 2023-09-23 NOTE — PLAN OF CARE
Problem: Adult Inpatient Plan of Care  Goal: Plan of Care Review  Outcome: Ongoing, Progressing  Flowsheets (Taken 9/23/2023 1520)  Plan of Care Reviewed With:   patient   parent   mother  Goal: Patient-Specific Goal (Individualized)  Outcome: Ongoing, Progressing  Flowsheets (Taken 9/23/2023 1520)  Anxieties, Fears or Concerns: none at this time, patient calm and cooperative  Individualized Care Needs: monitor for pain, wound care, monitor blood pressure  Goal: Absence of Hospital-Acquired Illness or Injury  Outcome: Ongoing, Progressing  Intervention: Identify and Manage Fall Risk  Flowsheets (Taken 9/23/2023 1520)  Safety Promotion/Fall Prevention: assistive device/personal item within reach  Intervention: Prevent Skin Injury  Flowsheets (Taken 9/23/2023 1520)  Body Position: 30 degrees  Skin Protection: adhesive use limited  Intervention: Prevent and Manage VTE (Venous Thromboembolism) Risk  Flowsheets (Taken 9/23/2023 1520)  Activity Management: Rolling - L1  VTE Prevention/Management: remove, assess skin, and reapply sequential compression device  Range of Motion: active ROM (range of motion) encouraged  Intervention: Prevent Infection  Flowsheets (Taken 9/23/2023 1520)  Infection Prevention:   cohorting utilized   hand hygiene promoted  Goal: Optimal Comfort and Wellbeing  Outcome: Ongoing, Progressing  Intervention: Monitor Pain and Promote Comfort  Flowsheets (Taken 9/23/2023 1520)  Pain Management Interventions:   around-the-clock dosing utilized   quiet environment facilitated  Intervention: Provide Person-Centered Care  Flowsheets (Taken 9/23/2023 1520)  Trust Relationship/Rapport:   care explained   choices provided   emotional support provided   empathic listening provided   questions answered   questions encouraged   reassurance provided   thoughts/feelings acknowledged  Goal: Readiness for Transition of Care  Outcome: Ongoing, Progressing     Problem: Diabetes Comorbidity  Goal: Blood Glucose Level  Within Targeted Range  Outcome: Ongoing, Progressing  Intervention: Monitor and Manage Glycemia  Flowsheets (Taken 9/23/2023 1520)  Glycemic Management: blood glucose monitored     Problem: Adjustment to Illness (Sepsis/Septic Shock)  Goal: Optimal Coping  Outcome: Ongoing, Progressing  Intervention: Optimize Psychosocial Adjustment to Illness  Flowsheets (Taken 9/23/2023 1520)  Supportive Measures: active listening utilized  Family/Support System Care: caregiver stress acknowledged     Problem: Bleeding (Sepsis/Septic Shock)  Goal: Absence of Bleeding  Outcome: Ongoing, Progressing  Intervention: Monitor and Manage Bleeding  Flowsheets (Taken 9/23/2023 1520)  Bleeding Precautions: blood pressure closely monitored  Bleeding Management: affected area elevated     Problem: Glycemic Control Impaired (Sepsis/Septic Shock)  Goal: Blood Glucose Level Within Desired Range  Outcome: Ongoing, Progressing  Intervention: Optimize Glycemic Control  Flowsheets (Taken 9/23/2023 1520)  Glycemic Management: blood glucose monitored     Problem: Infection Progression (Sepsis/Septic Shock)  Goal: Absence of Infection Signs and Symptoms  Outcome: Ongoing, Progressing  Intervention: Initiate Sepsis Management  Flowsheets (Taken 9/23/2023 1520)  Infection Prevention:   cohorting utilized   hand hygiene promoted  Infection Management: aseptic technique maintained  Isolation Precautions: precautions maintained  Intervention: Promote Recovery  Flowsheets (Taken 9/23/2023 1520)  Sleep/Rest Enhancement: awakenings minimized  Airway/Ventilation Support: comfort measures provided  Activity Management: Rolling - L1     Problem: Nutrition Impaired (Sepsis/Septic Shock)  Goal: Optimal Nutrition Intake  Outcome: Ongoing, Progressing  Intervention: Promote and Optimize Nutrition Delivery  Flowsheets (Taken 9/23/2023 1520)  Nutrition Support Management: (patient appetite fair, does not always like facility food) other (see comments)     Problem:  Infection  Goal: Absence of Infection Signs and Symptoms  Outcome: Ongoing, Progressing  Intervention: Prevent or Manage Infection  Flowsheets (Taken 9/23/2023 1520)  Infection Management: aseptic technique maintained  Isolation Precautions: precautions maintained     Problem: Impaired Wound Healing  Goal: Optimal Wound Healing  Outcome: Ongoing, Progressing  Intervention: Promote Wound Healing  Flowsheets (Taken 9/23/2023 1520)  Oral Nutrition Promotion: calorie-dense foods provided  Sleep/Rest Enhancement: awakenings minimized  Activity Management: Rolling - L1  Pain Management Interventions:   around-the-clock dosing utilized   quiet environment facilitated     Problem: Skin Injury Risk Increased  Goal: Skin Health and Integrity  Outcome: Ongoing, Progressing  Intervention: Optimize Skin Protection  Flowsheets (Taken 9/23/2023 1520)  Pressure Reduction Techniques: frequent weight shift encouraged  Pressure Reduction Devices:   pressure-redistributing mattress utilized   foam padding utilized   heel offloading device utilized  Skin Protection: adhesive use limited  Head of Bed (HOB) Positioning: HOB at 20-30 degrees  Intervention: Promote and Optimize Oral Intake  Flowsheets (Taken 9/23/2023 1520)  Oral Nutrition Promotion: calorie-dense foods provided     Problem: Fall Injury Risk  Goal: Absence of Fall and Fall-Related Injury  Outcome: Ongoing, Progressing  Intervention: Identify and Manage Contributors  Flowsheets (Taken 9/23/2023 1520)  Self-Care Promotion: independence encouraged  Medication Review/Management: medications reviewed  Intervention: Promote Injury-Free Environment  Flowsheets (Taken 9/23/2023 1520)  Safety Promotion/Fall Prevention: assistive device/personal item within reach

## 2023-09-24 LAB
ANION GAP SERPL CALC-SCNC: 7 MEQ/L
BASOPHILS # BLD AUTO: 0.03 X10(3)/MCL
BASOPHILS NFR BLD AUTO: 0.5 %
BUN SERPL-MCNC: 25.1 MG/DL (ref 8.4–25.7)
CALCIUM SERPL-MCNC: 8.2 MG/DL (ref 8.4–10.2)
CHLORIDE SERPL-SCNC: 105 MMOL/L (ref 98–107)
CO2 SERPL-SCNC: 25 MMOL/L (ref 22–29)
CREAT SERPL-MCNC: 0.81 MG/DL (ref 0.73–1.18)
CREAT/UREA NIT SERPL: 31
EOSINOPHIL # BLD AUTO: 0.26 X10(3)/MCL (ref 0–0.9)
EOSINOPHIL NFR BLD AUTO: 4.7 %
ERYTHROCYTE [DISTWIDTH] IN BLOOD BY AUTOMATED COUNT: 17.7 % (ref 11.5–17)
GFR SERPLBLD CREATININE-BSD FMLA CKD-EPI: >60 MLS/MIN/1.73/M2
GLUCOSE SERPL-MCNC: 126 MG/DL (ref 74–100)
HCT VFR BLD AUTO: 32.3 % (ref 42–52)
HGB BLD-MCNC: 9.6 G/DL (ref 14–18)
IMM GRANULOCYTES # BLD AUTO: 0 X10(3)/MCL (ref 0–0.04)
IMM GRANULOCYTES NFR BLD AUTO: 0 %
LYMPHOCYTES # BLD AUTO: 2.1 X10(3)/MCL (ref 0.6–4.6)
LYMPHOCYTES NFR BLD AUTO: 37.8 %
MCH RBC QN AUTO: 27.1 PG (ref 27–31)
MCHC RBC AUTO-ENTMCNC: 29.7 G/DL (ref 33–36)
MCV RBC AUTO: 91.2 FL (ref 80–94)
MONOCYTES # BLD AUTO: 0.38 X10(3)/MCL (ref 0.1–1.3)
MONOCYTES NFR BLD AUTO: 6.8 %
NEUTROPHILS # BLD AUTO: 2.78 X10(3)/MCL (ref 2.1–9.2)
NEUTROPHILS NFR BLD AUTO: 50.2 %
PLATELET # BLD AUTO: 116 X10(3)/MCL (ref 130–400)
PMV BLD AUTO: 9.4 FL (ref 7.4–10.4)
POCT GLUCOSE: 127 MG/DL (ref 70–110)
POCT GLUCOSE: 134 MG/DL (ref 70–110)
POCT GLUCOSE: 186 MG/DL (ref 70–110)
POCT GLUCOSE: 186 MG/DL (ref 70–110)
POTASSIUM SERPL-SCNC: 4.8 MMOL/L (ref 3.5–5.1)
RBC # BLD AUTO: 3.54 X10(6)/MCL (ref 4.7–6.1)
SODIUM SERPL-SCNC: 137 MMOL/L (ref 136–145)
WBC # SPEC AUTO: 5.55 X10(3)/MCL (ref 4.5–11.5)

## 2023-09-24 PROCEDURE — 27000207 HC ISOLATION

## 2023-09-24 PROCEDURE — 25000003 PHARM REV CODE 250: Performed by: STUDENT IN AN ORGANIZED HEALTH CARE EDUCATION/TRAINING PROGRAM

## 2023-09-24 PROCEDURE — 11000004 HC SNF PRIVATE

## 2023-09-24 PROCEDURE — 63600175 PHARM REV CODE 636 W HCPCS: Performed by: STUDENT IN AN ORGANIZED HEALTH CARE EDUCATION/TRAINING PROGRAM

## 2023-09-24 PROCEDURE — 85025 COMPLETE CBC W/AUTO DIFF WBC: CPT | Performed by: STUDENT IN AN ORGANIZED HEALTH CARE EDUCATION/TRAINING PROGRAM

## 2023-09-24 PROCEDURE — 80048 BASIC METABOLIC PNL TOTAL CA: CPT | Performed by: STUDENT IN AN ORGANIZED HEALTH CARE EDUCATION/TRAINING PROGRAM

## 2023-09-24 PROCEDURE — 25000003 PHARM REV CODE 250: Performed by: INTERNAL MEDICINE

## 2023-09-24 PROCEDURE — 94760 N-INVAS EAR/PLS OXIMETRY 1: CPT

## 2023-09-24 RX ADMIN — SODIUM CHLORIDE: 9 INJECTION, SOLUTION INTRAVENOUS at 09:09

## 2023-09-24 RX ADMIN — ASPIRIN 81 MG: 81 TABLET, COATED ORAL at 08:09

## 2023-09-24 RX ADMIN — SODIUM CHLORIDE: 9 INJECTION, SOLUTION INTRAVENOUS at 01:09

## 2023-09-24 RX ADMIN — MULTIPLE VITAMINS W/ MINERALS TAB 1 TABLET: TAB at 08:09

## 2023-09-24 RX ADMIN — LINEZOLID 600 MG: 600 TABLET, FILM COATED ORAL at 09:09

## 2023-09-24 RX ADMIN — MEROPENEM 1 G: 1 INJECTION, POWDER, FOR SOLUTION INTRAVENOUS at 01:09

## 2023-09-24 RX ADMIN — OXYCODONE HYDROCHLORIDE AND ACETAMINOPHEN 500 MG: 500 TABLET ORAL at 08:09

## 2023-09-24 RX ADMIN — LINEZOLID 600 MG: 600 TABLET, FILM COATED ORAL at 08:09

## 2023-09-24 RX ADMIN — MEROPENEM 1 G: 1 INJECTION, POWDER, FOR SOLUTION INTRAVENOUS at 09:09

## 2023-09-24 RX ADMIN — TRAMADOL HYDROCHLORIDE 50 MG: 50 TABLET, COATED ORAL at 11:09

## 2023-09-24 RX ADMIN — FERROUS SULFATE TAB 325 MG (65 MG ELEMENTAL FE) 1 EACH: 325 (65 FE) TAB at 08:09

## 2023-09-24 RX ADMIN — MEROPENEM 1 G: 1 INJECTION, POWDER, FOR SOLUTION INTRAVENOUS at 04:09

## 2023-09-24 RX ADMIN — SITAGLIPTIN 100 MG: 50 TABLET, FILM COATED ORAL at 08:09

## 2023-09-24 RX ADMIN — CARVEDILOL 12.5 MG: 12.5 TABLET, FILM COATED ORAL at 05:09

## 2023-09-24 RX ADMIN — SODIUM CHLORIDE 25 ML: 9 INJECTION, SOLUTION INTRAVENOUS at 04:09

## 2023-09-24 RX ADMIN — ONDANSETRON 8 MG: 4 TABLET, ORALLY DISINTEGRATING ORAL at 09:09

## 2023-09-24 RX ADMIN — INSULIN DETEMIR 7 UNITS: 100 INJECTION, SOLUTION SUBCUTANEOUS at 09:09

## 2023-09-24 RX ADMIN — CARVEDILOL 12.5 MG: 12.5 TABLET, FILM COATED ORAL at 08:09

## 2023-09-24 RX ADMIN — INSULIN ASPART 2 UNITS: 100 INJECTION, SOLUTION INTRAVENOUS; SUBCUTANEOUS at 05:09

## 2023-09-24 NOTE — PLAN OF CARE
Problem: Adult Inpatient Plan of Care  Goal: Plan of Care Review  Outcome: Ongoing, Progressing  Flowsheets (Taken 9/24/2023 1851)  Plan of Care Reviewed With: patient  Goal: Patient-Specific Goal (Individualized)  Outcome: Ongoing, Progressing  Flowsheets (Taken 9/24/2023 1851)  Anxieties, Fears or Concerns: concerned about nausea after eating  Individualized Care Needs: wound care, IV abt, bp monitor, turn q 2 hrs  Goal: Absence of Hospital-Acquired Illness or Injury  Outcome: Ongoing, Progressing  Intervention: Identify and Manage Fall Risk  Flowsheets (Taken 9/24/2023 1851)  Safety Promotion/Fall Prevention:   assistive device/personal item within reach   family to remain at bedside   medications reviewed   room near unit station   instructed to call staff for mobility  Intervention: Prevent Skin Injury  Flowsheets (Taken 9/24/2023 1851)  Body Position: turned  Skin Protection:   incontinence pads utilized   adhesive use limited   tubing/devices free from skin contact  Intervention: Prevent and Manage VTE (Venous Thromboembolism) Risk  Flowsheets (Taken 9/24/2023 1851)  Activity Management: Rolling - L1  VTE Prevention/Management:   bleeding precations maintained   bleeding risk assessed  Intervention: Prevent Infection  Flowsheets (Taken 9/24/2023 1851)  Infection Prevention:   hand hygiene promoted   equipment surfaces disinfected   environmental surveillance performed   personal protective equipment utilized   rest/sleep promoted   single patient room provided  Goal: Optimal Comfort and Wellbeing  Outcome: Ongoing, Progressing  Intervention: Monitor Pain and Promote Comfort  Flowsheets (Taken 9/24/2023 1851)  Pain Management Interventions:   care clustered   medication offered   pain management plan reviewed with patient/caregiver   pillow support provided   position adjusted   quiet environment facilitated  Intervention: Provide Person-Centered Care  Flowsheets (Taken 9/24/2023 1851)  Trust  Relationship/Rapport:   care explained   choices provided   reassurance provided  Goal: Readiness for Transition of Care  Outcome: Ongoing, Progressing     Problem: Diabetes Comorbidity  Goal: Blood Glucose Level Within Targeted Range  Outcome: Ongoing, Progressing  Intervention: Monitor and Manage Glycemia  Flowsheets (Taken 9/24/2023 1851)  Glycemic Management:   blood glucose monitored   supplemental insulin given     Problem: Infection  Goal: Absence of Infection Signs and Symptoms  Outcome: Ongoing, Progressing  Intervention: Prevent or Manage Infection  Flowsheets (Taken 9/24/2023 1851)  Fever Reduction/Comfort Measures: lightweight bedding  Infection Management: aseptic technique maintained  Isolation Precautions:   precautions maintained   contact     Problem: Impaired Wound Healing  Goal: Optimal Wound Healing  Outcome: Ongoing, Progressing     Problem: Skin Injury Risk Increased  Goal: Skin Health and Integrity  Outcome: Ongoing, Progressing     Problem: Fall Injury Risk  Goal: Absence of Fall and Fall-Related Injury  Outcome: Ongoing, Progressing     Problem: Mobility Impairment  Goal: Optimal Mobility  Outcome: Ongoing, Progressing

## 2023-09-24 NOTE — PLAN OF CARE
Problem: Adult Inpatient Plan of Care  Goal: Plan of Care Review  Outcome: Ongoing, Progressing  Flowsheets (Taken 9/24/2023 0131)  Plan of Care Reviewed With: patient  Goal: Patient-Specific Goal (Individualized)  Outcome: Ongoing, Progressing  Flowsheets (Taken 9/24/2023 0131)  Individualized Care Needs: wound care, IV abx, monitor pain and blood pressure, turn q2  Goal: Absence of Hospital-Acquired Illness or Injury  Outcome: Ongoing, Progressing  Intervention: Identify and Manage Fall Risk  Flowsheets (Taken 9/24/2023 0131)  Safety Promotion/Fall Prevention:   assistive device/personal item within reach   bed alarm set   Fall Risk reviewed with patient/family   family to remain at bedside   muscle strengthening facilitated   side rails raised x 3  Intervention: Prevent Skin Injury  Flowsheets (Taken 9/24/2023 0131)  Body Position: weight shifting  Skin Protection: silicone foam dressing in place  Intervention: Prevent and Manage VTE (Venous Thromboembolism) Risk  Flowsheets (Taken 9/24/2023 0131)  Activity Management: Rolling - L1  VTE Prevention/Management:   bleeding precations maintained   ROM (passive) performed  Range of Motion: ROM (range of motion) performed  Goal: Optimal Comfort and Wellbeing  Outcome: Ongoing, Progressing  Intervention: Monitor Pain and Promote Comfort  Flowsheets (Taken 9/24/2023 0131)  Pain Management Interventions:   care clustered   pain management plan reviewed with patient/caregiver   pillow support provided   position adjusted   quiet environment facilitated     Problem: Diabetes Comorbidity  Goal: Blood Glucose Level Within Targeted Range  Outcome: Ongoing, Progressing  Intervention: Monitor and Manage Glycemia  Flowsheets (Taken 9/24/2023 0131)  Glycemic Management:   blood glucose monitored   supplemental insulin given     Problem: Infection  Goal: Absence of Infection Signs and Symptoms  Outcome: Ongoing, Progressing  Intervention: Prevent or Manage Infection  Flowsheets  (Taken 9/24/2023 0131)  Fever Reduction/Comfort Measures:   lightweight bedding   lightweight clothing  Infection Management: aseptic technique maintained  Isolation Precautions:   precautions maintained   contact     Problem: Impaired Wound Healing  Goal: Optimal Wound Healing  Outcome: Ongoing, Progressing  Intervention: Promote Wound Healing  Flowsheets (Taken 9/24/2023 0131)  Oral Nutrition Promotion:   calorie-dense foods provided   calorie-dense liquids provided   physical activity promoted   safe use of adaptive equipment encouraged  Sleep/Rest Enhancement:   awakenings minimized   consistent schedule promoted   family presence promoted   noise level reduced   regular sleep/rest pattern promoted   room darkened  Activity Management: Rolling - L1  Pain Management Interventions:   care clustered   pain management plan reviewed with patient/caregiver   pillow support provided   position adjusted   quiet environment facilitated     Problem: Skin Injury Risk Increased  Goal: Skin Health and Integrity  Outcome: Ongoing, Progressing  Intervention: Optimize Skin Protection  Flowsheets (Taken 9/24/2023 0131)  Pressure Reduction Techniques:   frequent weight shift encouraged   heels elevated off bed   positioned off wounds   pressure points protected   weight shift assistance provided  Pressure Reduction Devices:   positioning supports utilized   heel offloading device utilized   pressure-redistributing mattress utilized  Skin Protection: silicone foam dressing in place  Head of Bed (HOB) Positioning: HOB at 20-30 degrees  Intervention: Promote and Optimize Oral Intake  Flowsheets (Taken 9/24/2023 0131)  Oral Nutrition Promotion:   calorie-dense foods provided   calorie-dense liquids provided   physical activity promoted   safe use of adaptive equipment encouraged     Problem: Fall Injury Risk  Goal: Absence of Fall and Fall-Related Injury  Outcome: Ongoing, Progressing  Intervention: Identify and Manage  Contributors  Flowsheets (Taken 9/24/2023 0131)  Self-Care Promotion:   independence encouraged   BADL personal objects within reach   BADL personal routines maintained   safe use of adaptive equipment encouraged   meal set-up provided  Intervention: Promote Injury-Free Environment  Flowsheets (Taken 9/24/2023 0131)  Safety Promotion/Fall Prevention:   assistive device/personal item within reach   bed alarm set   Fall Risk reviewed with patient/family   family to remain at bedside   muscle strengthening facilitated   side rails raised x 3     Problem: Mobility Impairment  Goal: Optimal Mobility  Outcome: Ongoing, Progressing  Intervention: Optimize Mobility  Flowsheets (Taken 9/24/2023 0131)  Assistive Device Utilized: lift device  Activity Management: Rolling - L1  Positioning/Transfer Devices:   lift device utilized   pillows   wedge   in use
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
## 2023-09-25 LAB
FUNGUS SPEC CULT: NORMAL
FUNGUS SPEC CULT: NORMAL
POCT GLUCOSE: 109 MG/DL (ref 70–110)
POCT GLUCOSE: 120 MG/DL (ref 70–110)
POCT GLUCOSE: 126 MG/DL (ref 70–110)

## 2023-09-25 PROCEDURE — 25000003 PHARM REV CODE 250: Performed by: INTERNAL MEDICINE

## 2023-09-25 PROCEDURE — 99900035 HC TECH TIME PER 15 MIN (STAT)

## 2023-09-25 PROCEDURE — 27000221 HC OXYGEN, UP TO 24 HOURS

## 2023-09-25 PROCEDURE — 25000003 PHARM REV CODE 250: Performed by: STUDENT IN AN ORGANIZED HEALTH CARE EDUCATION/TRAINING PROGRAM

## 2023-09-25 PROCEDURE — 27000207 HC ISOLATION

## 2023-09-25 PROCEDURE — 63600175 PHARM REV CODE 636 W HCPCS: Performed by: STUDENT IN AN ORGANIZED HEALTH CARE EDUCATION/TRAINING PROGRAM

## 2023-09-25 PROCEDURE — 97530 THERAPEUTIC ACTIVITIES: CPT

## 2023-09-25 PROCEDURE — 11000004 HC SNF PRIVATE

## 2023-09-25 PROCEDURE — 94760 N-INVAS EAR/PLS OXIMETRY 1: CPT

## 2023-09-25 RX ADMIN — MEROPENEM 1 G: 1 INJECTION, POWDER, FOR SOLUTION INTRAVENOUS at 01:09

## 2023-09-25 RX ADMIN — ASPIRIN 81 MG: 81 TABLET, COATED ORAL at 08:09

## 2023-09-25 RX ADMIN — SODIUM CHLORIDE: 9 INJECTION, SOLUTION INTRAVENOUS at 01:09

## 2023-09-25 RX ADMIN — FERROUS SULFATE TAB 325 MG (65 MG ELEMENTAL FE) 1 EACH: 325 (65 FE) TAB at 08:09

## 2023-09-25 RX ADMIN — SODIUM CHLORIDE: 9 INJECTION, SOLUTION INTRAVENOUS at 08:09

## 2023-09-25 RX ADMIN — MULTIPLE VITAMINS W/ MINERALS TAB 1 TABLET: TAB at 08:09

## 2023-09-25 RX ADMIN — INSULIN DETEMIR 7 UNITS: 100 INJECTION, SOLUTION SUBCUTANEOUS at 08:09

## 2023-09-25 RX ADMIN — SITAGLIPTIN 100 MG: 50 TABLET, FILM COATED ORAL at 08:09

## 2023-09-25 RX ADMIN — CARVEDILOL 12.5 MG: 12.5 TABLET, FILM COATED ORAL at 08:09

## 2023-09-25 RX ADMIN — CARVEDILOL 12.5 MG: 12.5 TABLET, FILM COATED ORAL at 05:09

## 2023-09-25 RX ADMIN — TRAMADOL HYDROCHLORIDE 50 MG: 50 TABLET, COATED ORAL at 11:09

## 2023-09-25 RX ADMIN — LINEZOLID 600 MG: 600 TABLET, FILM COATED ORAL at 08:09

## 2023-09-25 RX ADMIN — OXYCODONE HYDROCHLORIDE AND ACETAMINOPHEN 500 MG: 500 TABLET ORAL at 08:09

## 2023-09-25 RX ADMIN — LINEZOLID 600 MG: 600 TABLET, FILM COATED ORAL at 09:09

## 2023-09-25 RX ADMIN — SODIUM CHLORIDE: 9 INJECTION, SOLUTION INTRAVENOUS at 04:09

## 2023-09-25 RX ADMIN — MEROPENEM 1 G: 1 INJECTION, POWDER, FOR SOLUTION INTRAVENOUS at 04:09

## 2023-09-25 RX ADMIN — MEROPENEM 1 G: 1 INJECTION, POWDER, FOR SOLUTION INTRAVENOUS at 08:09

## 2023-09-25 NOTE — PROGRESS NOTES
Continued improvement noted with current dressing change regimen.   Re-dressed all sites per wound orders.  No deterioration to wounds has been noted since pt is mobilizing at edge of bed with Physical therapy.  No lexi bleeding noted with increased mobility.   All pressure prevention measures remain in use while hospitalized.   Discussed continued offloading, nutrition for healing once discharged.  Pt verbalized understanding.     09/25/23 1200   WOCN Assessment   WOCN Total Time (mins) 60   Visit Date 09/25/23   Consult Type Follow Up   WOCN Speciality Wound   Wound pressure;surgical   Number of Wounds 6   Intervention assessed;changed;chart review   Teaching on-going   Skin Interventions   Device Skin Pressure Protection absorbent pad utilized/changed;positioning supports utilized   Pressure Reduction Devices specialty bed utilized;positioning supports utilized;heel offloading device utilized   Pressure Reduction Techniques weight shift assistance provided;positioned off wounds;heels elevated off bed   Skin Protection incontinence pads utilized;tubing/devices free from skin contact   Pressure Injury Prevention    Check Moisture Management Pad Done   Heel protection technique Heel boot   Check Medical Devices Done        Altered Skin Integrity 05/06/22 1140 Right Heel  Full thickness tissue loss. Subcutaneous fat may be visible but bone, tendon or muscle are not exposed   Date First Assessed/Time First Assessed: 05/06/22 1140   Altered Skin Integrity Present on Admission: yes  Side: Right  Location: Heel  Is this injury device related?: No  Primary Wound Type: (c)   Description of Altered Skin Integrity: Full thickness...   Wound Image    Description of Altered Skin Integrity Full thickness tissue loss. Subcutaneous fat may be visible but bone, tendon or muscle are not exposed   Dressing Appearance Intact;Dried drainage   Drainage Amount Scant   Drainage Characteristics/Odor Sanguineous   Appearance  Red;Granulating   Tissue loss description Full thickness   Red (%), Wound Tissue Color 100 %   Periwound Area Dry;Scar tissue   Wound Edges Irregular   Wound Length (cm) 2 cm   Wound Width (cm) 1 cm   Wound Depth (cm) 0.1 cm   Wound Volume (cm^3) 0.2 cm^3   Wound Surface Area (cm^2) 2 cm^2   Care Cleansed with:;Wound cleanser;Applied:;Povidone iodine   Dressing Applied;Gauze;Absorptive Pad;Rolled gauze   Periwound Care Dry periwound area maintained   Off Loading Other (see comments)  (heel lift boot)        Altered Skin Integrity 01/12/23 1530 Sacral spine Full thickness tissue loss with exposed bone, tendon, or muscle. Often includes undermining and tunneling. May extend into muscle and/or supporting structures.   Date First Assessed/Time First Assessed: 01/12/23 1530   Altered Skin Integrity Present on Admission - Did Patient arrive to the hospital with altered skin?: yes  Location: Sacral spine  Description of Altered Skin Integrity: Full thickness tissue los...   Wound Image    Description of Altered Skin Integrity Full thickness tissue loss. Subcutaneous fat may be visible but bone, tendon or muscle are not exposed   Drainage Amount Small   Drainage Characteristics/Odor Sanguineous;No odor;Bleeding controlled   Appearance Red;Granulating;White;Fibrin   Tissue loss description Full thickness   Red (%), Wound Tissue Color 95 %   Periwound Area Scar tissue;Moist   Wound Edges Irregular   Wound Length (cm) 2.5 cm   Wound Width (cm) 1 cm   Wound Depth (cm) 0.2 cm   Wound Volume (cm^3) 0.5 cm^3   Wound Surface Area (cm^2) 2.5 cm^2   Care Cleansed with:;Wound cleanser;Applied:;Skin Barrier   Dressing Applied;Hydrofiber;Silver;Gauze   Packing packed with;hydrofiber/alginate   Dressing Change Due 09/26/23        Altered Skin Integrity 08/01/23 1535 Left anterior Ankle Other (comment) Full thickness tissue loss. Base is covered by slough and/or eschar in the wound bed   Date First Assessed/Time First Assessed: 08/01/23  1535   Altered Skin Integrity Present on Admission - Did Patient arrive to the hospital with altered skin?: yes  Side: Left  Orientation: anterior  Location: Ankle  Is this injury device related?: No  ...   Wound Image    Description of Altered Skin Integrity Full thickness tissue loss. Base is covered by slough and/or eschar in the wound bed   Dressing Appearance Intact;Dry;Clean   Drainage Amount None   Appearance Black;Eschar;Dry   Tissue loss description Full thickness   Black (%), Wound Tissue Color 100 %   Periwound Area Intact;Dry   Wound Edges Defined   Wound Length (cm) 3.3 cm   Wound Width (cm) 2.8 cm   Wound Surface Area (cm^2) 9.24 cm^2   Care Cleansed with:;Wound cleanser;Applied:;Povidone iodine   Dressing Applied;Gauze;Rolled gauze   Off Loading Other (see comments)  (heel lift boot)   Dressing Change Due 09/27/23        Altered Skin Integrity 08/01/23 1534 Left posterior Ankle Full thickness tissue loss. Base is covered by slough and/or eschar in the wound bed   Date First Assessed/Time First Assessed: 08/01/23 1534   Altered Skin Integrity Present on Admission - Did Patient arrive to the hospital with altered skin?: yes  Side: Left  Orientation: posterior  Location: Ankle  Description of Altered Skin Integri...   Wound Image    Description of Altered Skin Integrity Full thickness tissue loss. Base is covered by slough and/or eschar in the wound bed   Dressing Appearance Intact;Dry;Clean   Drainage Amount None   Appearance Black;Dry;Eschar;Red;Not granulating   Tissue loss description Full thickness   Black (%), Wound Tissue Color 95 %   Red (%), Wound Tissue Color 5 %   Periwound Area Intact;Dry   Wound Edges Defined   Wound Length (cm) 3.7 cm   Wound Width (cm) 3 cm   Wound Surface Area (cm^2) 11.1 cm^2   Care Cleansed with:;Wound cleanser;Applied:;Povidone iodine   Dressing Applied;Gauze;Absorptive Pad;Rolled gauze   Off Loading Other (see comments)  (heel lift boot)   Dressing Change Due 09/27/23         Incision/Site 08/16/23 2011 Right Hip posterior   Date First Assessed/Time First Assessed: 08/16/23 2011   Side: Right  Location: Hip  Orientation: posterior   Wound Image    Dressing Appearance Intact;Moist drainage   Drainage Amount Moderate   Drainage Characteristics/Odor Serosanguineous;No odor;Bleeding controlled   Appearance Red;Granulating;White;Yellow  (connective tissue)   Red (%), Wound Tissue Color 95 %   Periwound Area Intact;Scar tissue   Wound Edges Defined   Wound Length (cm) 10.3 cm   Wound Width (cm) 14.2 cm   Wound Depth (cm) 2 cm   Wound Volume (cm^3) 292.52 cm^3   Wound Surface Area (cm^2) 146.26 cm^2   Care Cleansed with:;Wound cleanser   Dressing Applied;Non-adherent;Hydrofiber;Silver;Gauze;Absorptive Pad;Other (comment)  (medfix tape)   Periwound Care Skin barrier film applied;Hydrocolloid barrier applied   Dressing Change Due 09/26/23        Incision/Site 08/16/23 2011 Left Hip lateral   Date First Assessed/Time First Assessed: 08/16/23 2011   Side: Left  Location: Hip  Orientation: lateral   Wound Image    Dressing Appearance Intact;Moist drainage   Drainage Amount Small   Drainage Characteristics/Odor Serosanguineous;No odor;Bleeding controlled   Appearance Red;Granulating;Yellow;White;Other (see comments)  (connective tissue)   Red (%), Wound Tissue Color 95 %   Periwound Area Intact;Dry   Wound Edges Defined   Wound Length (cm) 8.6 cm   Wound Width (cm) 4.2 cm   Wound Depth (cm) 2.3 cm   Wound Volume (cm^3) 83.076 cm^3   Wound Surface Area (cm^2) 36.12 cm^2   Care Cleansed with:;Wound cleanser   Dressing Applied;Non-adherent;Gauze, wet to moist  (vashe moistened gauze)   Packing packed with;other (see comment)  (vashe moistened gauze)   Periwound Care Skin barrier film applied;Dry periwound area maintained   Dressing Change Due 09/26/23

## 2023-09-25 NOTE — PLAN OF CARE
Problem: Adult Inpatient Plan of Care  Goal: Plan of Care Review  Outcome: Ongoing, Progressing  Flowsheets (Taken 9/25/2023 0203)  Plan of Care Reviewed With:   patient   caregiver  Goal: Patient-Specific Goal (Individualized)  Outcome: Ongoing, Progressing  Flowsheets (Taken 9/25/2023 0203)  Anxieties, Fears or Concerns: pt concerned about nausea after eating  Individualized Care Needs: Wound Care, IV abx, monitor PB, Turn Q2hrs, monitor nausea after meals  Goal: Absence of Hospital-Acquired Illness or Injury  Outcome: Ongoing, Progressing  Intervention: Prevent and Manage VTE (Venous Thromboembolism) Risk  Flowsheets (Taken 9/24/2023 2000)  VTE Prevention/Management:   bleeding precations maintained   bleeding risk assessed   ROM (passive) performed   fluids promoted  Intervention: Prevent Infection  Flowsheets (Taken 9/25/2023 0203)  Infection Prevention:   hand hygiene promoted   personal protective equipment utilized   rest/sleep promoted   single patient room provided  Goal: Optimal Comfort and Wellbeing  Outcome: Ongoing, Progressing  Intervention: Monitor Pain and Promote Comfort  Flowsheets (Taken 9/24/2023 2000)  Pain Management Interventions:   care clustered   diversional activity provided   pain management plan reviewed with patient/caregiver   pillow support provided   position adjusted   quiet environment facilitated   relaxation techniques promoted     Problem: Diabetes Comorbidity  Goal: Blood Glucose Level Within Targeted Range  Outcome: Ongoing, Progressing

## 2023-09-25 NOTE — PLAN OF CARE
Problem: Adult Inpatient Plan of Care  Goal: Plan of Care Review  Outcome: Ongoing, Progressing  Flowsheets (Taken 9/25/2023 1036)  Plan of Care Reviewed With:   patient   caregiver  Goal: Patient-Specific Goal (Individualized)  Outcome: Ongoing, Progressing  Flowsheets (Taken 9/25/2023 1036)  Anxieties, Fears or Concerns: pt anxious to go home, tired this morning  Individualized Care Needs: wound care, IV abt, monitor bp, monitor for nausea  Goal: Absence of Hospital-Acquired Illness or Injury  Outcome: Ongoing, Progressing  Intervention: Identify and Manage Fall Risk  Flowsheets (Taken 9/25/2023 1036)  Safety Promotion/Fall Prevention:   assistive device/personal item within reach   family to remain at bedside   medications reviewed   room near unit station   instructed to call staff for mobility  Intervention: Prevent Skin Injury  Flowsheets (Taken 9/25/2023 1036)  Body Position:   turned   sitting up in bed  Skin Protection:   incontinence pads utilized   adhesive use limited   tubing/devices free from skin contact  Intervention: Prevent and Manage VTE (Venous Thromboembolism) Risk  Flowsheets (Taken 9/25/2023 1036)  Activity Management: Rolling - L1  VTE Prevention/Management:   bleeding precations maintained   bleeding risk assessed  Range of Motion: ROM (range of motion) performed  Intervention: Prevent Infection  Flowsheets (Taken 9/25/2023 1036)  Infection Prevention:   environmental surveillance performed   equipment surfaces disinfected   hand hygiene promoted   single patient room provided   rest/sleep promoted   personal protective equipment utilized  Goal: Optimal Comfort and Wellbeing  Outcome: Ongoing, Progressing  Goal: Readiness for Transition of Care  Outcome: Ongoing, Progressing     Problem: Diabetes Comorbidity  Goal: Blood Glucose Level Within Targeted Range  Outcome: Ongoing, Progressing  Intervention: Monitor and Manage Glycemia  Flowsheets (Taken 9/25/2023 1036)  Glycemic Management:  blood glucose monitored     Problem: Infection  Goal: Absence of Infection Signs and Symptoms  Outcome: Ongoing, Progressing     Problem: Impaired Wound Healing  Goal: Optimal Wound Healing  Outcome: Ongoing, Progressing  Intervention: Promote Wound Healing  Flowsheets (Taken 9/25/2023 1036)  Oral Nutrition Promotion:   calorie-dense foods provided   calorie-dense liquids provided  Sleep/Rest Enhancement: relaxation techniques promoted  Activity Management: Rolling - L1  Pain Management Interventions:   care clustered   pillow support provided   position adjusted   medication offered   pain management plan reviewed with patient/caregiver   relaxation techniques promoted     Problem: Skin Injury Risk Increased  Goal: Skin Health and Integrity  Outcome: Ongoing, Progressing     Problem: Fall Injury Risk  Goal: Absence of Fall and Fall-Related Injury  Outcome: Ongoing, Progressing  Intervention: Identify and Manage Contributors  Flowsheets (Taken 9/25/2023 1036)  Medication Review/Management: medications reviewed  Intervention: Promote Injury-Free Environment  Flowsheets (Taken 9/25/2023 1036)  Safety Promotion/Fall Prevention:   assistive device/personal item within reach   family to remain at bedside   medications reviewed   room near unit station   instructed to call staff for mobility     Problem: Mobility Impairment  Goal: Optimal Mobility  Outcome: Ongoing, Progressing
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
## 2023-09-25 NOTE — PROGRESS NOTES
Inpatient Nutrition Evaluation    Admit Date: 8/23/2023   Total duration of encounter: 33 days    Nutrition Recommendation/Prescription     Continue diabetic diet double double protein  2. Continue boost glucose control TID and Jvuen BID, used in house substitution arginaid 2. Continue prosource 1 pk BID.    Nutrition Assessment     Chart Review    Reason Seen: continuous nutrition monitoring, length of stay, and follow-up    Malnutrition Screening Tool Results   Have you recently lost weight without trying?: No  Have you been eating poorly because of a decreased appetite?: No   MST Score: 0     Diagnosis:    Open wound of left hip  Relevant Medical History:    A-fib      Cardiac arrest       7/2022    Chronic skin ulcer      DM (diabetes mellitus)      Infected decubitus ulcer      Lymphedema of leg      Neurogenic bladder      Obesity, unspecified      Pressure ulcer of heel      Pressure ulcer of right foot, stage 3      Pressure ulcer of right ischium      Quadriplegia      Quadriplegic spinal paralysis          Nutrition-Related Medications: ascorbic acid, ferrous sulfate, insulin aspart, insulin detemir, multivitamin, senna-docusate,    Nutrition-Related Labs:   Latest Reference Range & Units 09/24/23 06:42   Sodium 136 - 145 mmol/L 137   Potassium 3.5 - 5.1 mmol/L 4.8   Chloride 98 - 107 mmol/L 105   CO2 22 - 29 mmol/L 25   Anion Gap mEq/L 7.0   BUN 8.4 - 25.7 mg/dL 25.1   Creatinine 0.73 - 1.18 mg/dL 0.81   BUN/CREAT RATIO  31   eGFR mls/min/1.73/m2 >60   Glucose 74 - 100 mg/dL 126 (H)   Calcium 8.4 - 10.2 mg/dL 8.2 (L)   (H): Data is abnormally high  (L): Data is abnormally low    Diet Order: Diet diabetic  Oral Supplement Order: Boost Glucose Control, Cole, and Prosource No Carb  Appetite/Oral Intake: fair/50-75% of meals  Factors Affecting Nutritional Intake: decreased appetite  Food/Confucianism/Cultural Preferences:  turkey sandwich  Food Allergies: none reported    Skin Integrity: wound, drain/device(s),  "rash, other (see comments) (redness, flaky to skin of neck)  Wound(s):      Altered Skin Integrity 05/06/22 1140 Right Heel  Full thickness tissue loss. Subcutaneous fat may be visible but bone, tendon or muscle are not exposed-Tissue loss description: Full thickness       Altered Skin Integrity 01/12/23 1530 Sacral spine Full thickness tissue loss with exposed bone, tendon, or muscle. Often includes undermining and tunneling. May extend into muscle and/or supporting structures.-Tissue loss description: Full thickness       Altered Skin Integrity 08/01/23 1535 Left anterior Ankle Other (comment) Full thickness tissue loss. Base is covered by slough and/or eschar in the wound bed-Tissue loss description: Full thickness       Altered Skin Integrity 08/01/23 1534 Left posterior Ankle Full thickness tissue loss. Base is covered by slough and/or eschar in the wound bed-Tissue loss description: Full thickness     Comments    Pt reports intake fair. Discussed food preferences. Therapy present. Notified kitchen staff. Encourage good PO Intake.  No new wound care nurse note.     Anthropometrics    Height: 5' 7.99" (172.7 cm) Height Method: Stated  Last Weight:  (refuesd weight) (09/25/23 0418) Weight Method: Bed Scale  BMI (Calculated): 33.4  BMI Classification: obese grade I (BMI 30-34.9)        Ideal Body Weight (IBW), Male: 153.94 lb     % Ideal Body Weight, Male (lb): 152.66 %                          Usual Weight Provided By: unable to obtain usual weight    Wt Readings from Last 5 Encounters:   08/16/23 106.4 kg (234 lb 9.6 oz)   08/15/23 113.4 kg (250 lb)   08/14/23 108.9 kg (240 lb)   06/23/23 100.7 kg (222 lb 0.1 oz)   06/22/23 100.7 kg (222 lb 0.1 oz)     Weight Change(s) Since Admission:  Admit Weight: 106.6 kg (235 lb 0.2 oz) (08/23/23 1605)  Pt refused to be weighed this week.     Patient Education    Not applicable.    Monitoring & Evaluation     Dietitian will monitor food and beverage intake, weight, weight " change, electrolyte/renal panel, glucose/endocrine profile, and gastrointestinal profile.  Nutrition Risk/Follow-Up: low (follow-up in 5-7 days)  Patients assigned 'low nutrition risk' status do not qualify for a full nutritional assessment but will be monitored and re-evaluated in a 5-7 day time period. Please consult if re-evaluation needed sooner.

## 2023-09-25 NOTE — DISCHARGE INSTRUCTIONS
Sacrum: cleanse with wound cleanser, apply silver alginate, cover with gauze, abd prn, tape, change daily and prn soilage   Left hip: cleanse with wound cleanser, apply oil emulsion gauze (adaptic)to wound bed, pack inside with NS or Vashe moistened 4x4 (may use 1 continuous kerlix piece for packing lightly instead multiple 4x4 gauze pieces, cover with dry 4x4 gauze, and ABD, secure with tape, change daily and prn soilage   Right posterior thigh: cleanse with wound cleanser, apply oil emulsion gauze(adaptic) to wound base, cover wound bed with silver alginate, 4x4 and ABD pad, secure with tape, change daily.    Right heel/left achilles/left anterior foot: cleanse with wound cleanser, paint areas with betadine, allow to dry, cover with gauze, abd, wrap lightly with kerlix, secure with tape, change daily    [facility] at 2:30pm.

## 2023-09-25 NOTE — PT/OT/SLP PROGRESS
Physical Therapy Treatment Note           Name: Antonio Galvan II    : 1965 (57 y.o.)  MRN: [id number]           TREATMENT SUMMARY AND RECOMMENDATIONS:    PT Received On: 23  PT Start Time: 1015     PT Stop Time: 1048  PT Total Time (min): 33 min     Subjective Assessment:   No complaints  Lethargic   x Awake, alert, cooperative  Uncooperative    Agitated  c/o pain    Appropriate  c/o fatigue    Confused x Treated at bedside     Emotionally labile  Treated in gym/dept.    Impulsive  Other:    Flat affect       Therapy Precautions:    Cognitive deficits  Spinal precautions    Collar - hard  Sternal precautions    Collar - soft   TLSO    Fall risk  LSO    Hip precautions - posterior  Knee immobilizer    Hip precautions - anterior  WBAT    Impaired communication  Partial weightbearing    Oxygen  TTWB    PEG tube  NWB    Visual deficits x Other:Contact precautions, B greater trochanter wounds    Hearing deficits  x Quadriplegia        Treatment Objectives:     Mobility Training:   Assist level Comments    Bed mobility Total A Supine<>sit and rolling side to side   Transfer     Gait     Sit to stand transitions     Sitting balance MIN A X 25 min EOB - PT blocking/holding at B knee- due to tone in hips/trunk pt is able to maintain sitting posture    Standing balance      Wheelchair mobility     Car transfer     Other:          Therapeutic Exercise:   Exercise Sets Reps Comments                               Additional Comments:   BP:  sittin/86, 93/57, 110/77.  No issues at wound sites. Was able to tolerate well. BP regulates better in sitting. Proper positioning at tx end.     Assessment: Patient tolerated session well, very happy to sit EOB.    PT Plan: continue per POC  Revisions made to plan of care: No    GOALS:   Multidisciplinary Problems       Physical Therapy Goals          Problem: Physical Therapy    Goal Priority Disciplines Outcome Goal Variances Interventions   Physical Therapy  Goal     PT, PT/OT Ongoing, Progressing     Description: Goals to be met by: Discharge     Patient will increase functional independence with mobility by performin.  Pt to receive PROM BLEs and UEs 5-6 d/wk, qd,  to prevent further contractures and ensure ability for positioning in WC, along with proper positioning to reduce further skin break down  2.  Pt to tolerate sitting EOB x 30 min with normal BP to be demonstrated in preparation for progression to OOB>WC                       Skilled PT Minutes Provided: 30   Communication with Treatment Team:     Equipment recommendations:       At end of treatment, patient remained:   Up in chair     Up in wheelchair in room   x In bed   x With alarm activated   x Bed rails up   x Call bell in reach    x Family/friends present    Restraints secured properly    In bathroom with CNA/RN notified    Nurse aware    In gym with therapist/tech    Other:

## 2023-09-25 NOTE — PLAN OF CARE
[facility]  Discharge Reassessment    Primary Care Provider: Jennifer Fernando NP    Expected Discharge Date:     Reassessment (most recent)       Discharge Reassessment - 09/25/23 1829          Discharge Reassessment    Assessment Type Discharge Planning Reassessment     Did the patient's condition or plan change since previous assessment? No     Discharge Plan discussed with: Parent(s)     Name(s) and Number(s) Judy mother      Communicated SADE with patient/caregiver Date not available/Unable to determine     Discharge Plan A Home with family;Home Health     DME Needed Upon Discharge  none     Transition of Care Barriers None     Why the patient remains in the hospital Requires continued medical care        Post-Acute Status    Post-Acute Authorization Home Health     Home Health Status Pending medical clearance/testing     Hospital Resources/Appts/Education Provided Provided patient/caregiver with written discharge plan information     Discharge Delays None known at this time

## 2023-09-26 LAB
POCT GLUCOSE: 145 MG/DL (ref 70–110)
POCT GLUCOSE: 151 MG/DL (ref 70–110)

## 2023-09-26 PROCEDURE — 99900035 HC TECH TIME PER 15 MIN (STAT)

## 2023-09-26 PROCEDURE — 25000003 PHARM REV CODE 250: Performed by: INTERNAL MEDICINE

## 2023-09-26 PROCEDURE — 63600175 PHARM REV CODE 636 W HCPCS: Performed by: STUDENT IN AN ORGANIZED HEALTH CARE EDUCATION/TRAINING PROGRAM

## 2023-09-26 PROCEDURE — 27000207 HC ISOLATION

## 2023-09-26 PROCEDURE — 94760 N-INVAS EAR/PLS OXIMETRY 1: CPT

## 2023-09-26 PROCEDURE — 11000004 HC SNF PRIVATE

## 2023-09-26 PROCEDURE — 25000003 PHARM REV CODE 250: Performed by: STUDENT IN AN ORGANIZED HEALTH CARE EDUCATION/TRAINING PROGRAM

## 2023-09-26 PROCEDURE — 97530 THERAPEUTIC ACTIVITIES: CPT

## 2023-09-26 PROCEDURE — 27000221 HC OXYGEN, UP TO 24 HOURS

## 2023-09-26 RX ADMIN — CARVEDILOL 12.5 MG: 12.5 TABLET, FILM COATED ORAL at 08:09

## 2023-09-26 RX ADMIN — MEROPENEM 1 G: 1 INJECTION, POWDER, FOR SOLUTION INTRAVENOUS at 12:09

## 2023-09-26 RX ADMIN — OXYCODONE HYDROCHLORIDE AND ACETAMINOPHEN 500 MG: 500 TABLET ORAL at 08:09

## 2023-09-26 RX ADMIN — SITAGLIPTIN 100 MG: 50 TABLET, FILM COATED ORAL at 08:09

## 2023-09-26 RX ADMIN — ASPIRIN 81 MG: 81 TABLET, COATED ORAL at 08:09

## 2023-09-26 RX ADMIN — SODIUM CHLORIDE: 9 INJECTION, SOLUTION INTRAVENOUS at 08:09

## 2023-09-26 RX ADMIN — INSULIN DETEMIR 7 UNITS: 100 INJECTION, SOLUTION SUBCUTANEOUS at 08:09

## 2023-09-26 RX ADMIN — FERROUS SULFATE TAB 325 MG (65 MG ELEMENTAL FE) 1 EACH: 325 (65 FE) TAB at 08:09

## 2023-09-26 RX ADMIN — TRAMADOL HYDROCHLORIDE 50 MG: 50 TABLET, COATED ORAL at 01:09

## 2023-09-26 RX ADMIN — LINEZOLID 600 MG: 600 TABLET, FILM COATED ORAL at 08:09

## 2023-09-26 RX ADMIN — SODIUM CHLORIDE: 9 INJECTION, SOLUTION INTRAVENOUS at 12:09

## 2023-09-26 RX ADMIN — INSULIN ASPART 2 UNITS: 100 INJECTION, SOLUTION INTRAVENOUS; SUBCUTANEOUS at 05:09

## 2023-09-26 RX ADMIN — MULTIPLE VITAMINS W/ MINERALS TAB 1 TABLET: TAB at 08:09

## 2023-09-26 RX ADMIN — SODIUM CHLORIDE: 9 INJECTION, SOLUTION INTRAVENOUS at 04:09

## 2023-09-26 RX ADMIN — MEROPENEM 1 G: 1 INJECTION, POWDER, FOR SOLUTION INTRAVENOUS at 08:09

## 2023-09-26 RX ADMIN — INSULIN ASPART 2 UNITS: 100 INJECTION, SOLUTION INTRAVENOUS; SUBCUTANEOUS at 11:09

## 2023-09-26 RX ADMIN — MEROPENEM 1 G: 1 INJECTION, POWDER, FOR SOLUTION INTRAVENOUS at 04:09

## 2023-09-26 RX ADMIN — CARVEDILOL 12.5 MG: 12.5 TABLET, FILM COATED ORAL at 05:09

## 2023-09-26 NOTE — PLAN OF CARE
Problem: Adult Inpatient Plan of Care  Goal: Plan of Care Review  Outcome: Ongoing, Progressing  Flowsheets (Taken 9/26/2023 1728)  Plan of Care Reviewed With:   patient   mother  Goal: Patient-Specific Goal (Individualized)  Outcome: Ongoing, Progressing  Flowsheets (Taken 9/26/2023 1728)  Anxieties, Fears or Concerns: anxious to go home, wanted to get up in his power chair but there was difficulty with the lift  Individualized Care Needs: woundcare, IV abt, monitor bp  Goal: Absence of Hospital-Acquired Illness or Injury  Outcome: Ongoing, Progressing  Intervention: Identify and Manage Fall Risk  Flowsheets (Taken 9/26/2023 1728)  Safety Promotion/Fall Prevention:   assistive device/personal item within reach   medications reviewed   room near unit station   family to remain at bedside   instructed to call staff for mobility  Intervention: Prevent Skin Injury  Flowsheets (Taken 9/26/2023 1728)  Body Position:   turned   sitting up in bed  Skin Protection:   adhesive use limited   incontinence pads utilized   tubing/devices free from skin contact  Intervention: Prevent and Manage VTE (Venous Thromboembolism) Risk  Flowsheets (Taken 9/26/2023 1728)  Activity Management: Rolling - L1  VTE Prevention/Management:   bleeding precations maintained   bleeding risk assessed  Intervention: Prevent Infection  Flowsheets (Taken 9/26/2023 1728)  Infection Prevention:   rest/sleep promoted   hand hygiene promoted   single patient room provided  Goal: Optimal Comfort and Wellbeing  Outcome: Ongoing, Progressing  Goal: Readiness for Transition of Care  Outcome: Ongoing, Progressing     Problem: Diabetes Comorbidity  Goal: Blood Glucose Level Within Targeted Range  Outcome: Ongoing, Progressing  Intervention: Monitor and Manage Glycemia  Flowsheets (Taken 9/26/2023 1728)  Glycemic Management:   blood glucose monitored   supplemental insulin given     Problem: Infection  Goal: Absence of Infection Signs and Symptoms  Outcome:  Ongoing, Progressing     Problem: Impaired Wound Healing  Goal: Optimal Wound Healing  Outcome: Ongoing, Progressing  Intervention: Promote Wound Healing  Flowsheets (Taken 9/26/2023 9995)  Oral Nutrition Promotion:   calorie-dense foods provided   calorie-dense liquids provided  Activity Management: Rolling - L1  Pain Management Interventions:   position adjusted   pillow support provided   medication offered   care clustered   relaxation techniques promoted     Problem: Skin Injury Risk Increased  Goal: Skin Health and Integrity  Outcome: Ongoing, Progressing     Problem: Fall Injury Risk  Goal: Absence of Fall and Fall-Related Injury  Outcome: Ongoing, Progressing     Problem: Mobility Impairment  Goal: Optimal Mobility  Outcome: Ongoing, Progressing

## 2023-09-26 NOTE — PLAN OF CARE
Problem: Adult Inpatient Plan of Care  Goal: Plan of Care Review  Outcome: Ongoing, Progressing  Flowsheets (Taken 9/25/2023 1945)  Plan of Care Reviewed With: patient  Goal: Patient-Specific Goal (Individualized)  Outcome: Ongoing, Progressing  Flowsheets (Taken 9/25/2023 1945)  Anxieties, Fears or Concerns: none stated  Individualized Care Needs: woundcare, iv abx until end of shift 7a  Goal: Absence of Hospital-Acquired Illness or Injury  Outcome: Ongoing, Progressing  Intervention: Identify and Manage Fall Risk  Flowsheets (Taken 9/25/2023 1945)  Safety Promotion/Fall Prevention:   assistive device/personal item within reach   bed alarm set   Fall Risk reviewed with patient/family   side rails raised x 3  Intervention: Prevent Skin Injury  Flowsheets (Taken 9/25/2023 1945)  Body Position: head facing, left  Skin Protection: adhesive use limited  Intervention: Prevent and Manage VTE (Venous Thromboembolism) Risk  Flowsheets (Taken 9/25/2023 1945)  Activity Management: Rolling - L1  VTE Prevention/Management:   bleeding risk assessed   bleeding precations maintained  Intervention: Prevent Infection  Flowsheets (Taken 9/25/2023 1945)  Infection Prevention:   environmental surveillance performed   personal protective equipment utilized   rest/sleep promoted   equipment surfaces disinfected   single patient room provided   hand hygiene promoted
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
## 2023-09-26 NOTE — PT/OT/SLP PROGRESS
Physical Therapy Treatment Note           Name: Antonio Galvan II    : 1965 (57 y.o.)  MRN: [id number]           TREATMENT SUMMARY AND RECOMMENDATIONS:    PT Received On: 23  PT Start Time: 1120     PT Stop Time: 1200  PT Total Time (min): 40 min     Subjective Assessment:   No complaints  Lethargic   x Awake, alert, cooperative  Uncooperative    Agitated  c/o pain    Appropriate  c/o fatigue    Confused x Treated at bedside     Emotionally labile  Treated in gym/dept.    Impulsive  Other:    Flat affect       Therapy Precautions:    Cognitive deficits  Spinal precautions    Collar - hard  Sternal precautions    Collar - soft   TLSO    Fall risk  LSO    Hip precautions - posterior  Knee immobilizer    Hip precautions - anterior  WBAT    Impaired communication  Partial weightbearing    Oxygen  TTWB    PEG tube  NWB    Visual deficits x Other:Contact precautions, B greater trochanter wounds    Hearing deficits  x Quadriplegia        Treatment Objectives:     Mobility Training:   Assist level Comments    Bed mobility Total A Supine<>sit and rolling side to side x 2 times each direction to place tess pad and fix sheets    Transfer Attempted Pt was ready and set up for bed>WC transfers but tess lift would not work.    Gait     Sit to stand transitions     Sitting balance MIN A X 25 min EOB - PT blocking/holding at B knee- due to tone in hips/trunk pt is able to maintain sitting posture    Standing balance      Wheelchair mobility     Car transfer     Other:          Therapeutic Exercise:   Exercise Sets Reps Comments                               Additional Comments:  BP:  supine: 167/116 and 152/141, sittin/94, 154/90, 101/63, back to supine: 204/120.   Nursing aware of BP.   No issues at wound sites. Was able to tolerate well. BP regulates better in sitting. Maintenance called to look at tess. Will attempt again once fixed or sitter can assist via slide board when she gets here.      Assessment: Patient tolerated session well.    PT Plan: continue per POC  Revisions made to plan of care: No    GOALS:   Multidisciplinary Problems       Physical Therapy Goals          Problem: Physical Therapy    Goal Priority Disciplines Outcome Goal Variances Interventions   Physical Therapy Goal     PT, PT/OT Ongoing, Progressing     Description: Goals to be met by: Discharge     Patient will increase functional independence with mobility by performin.  Pt to receive PROM BLEs and UEs 5-6 d/wk, qd,  to prevent further contractures and ensure ability for positioning in WC, along with proper positioning to reduce further skin break down  2.  Pt to tolerate sitting EOB x 30 min with normal BP to be demonstrated in preparation for progression to OOB>WC                       Skilled PT Minutes Provided: 40   Communication with Treatment Team:     Equipment recommendations:       At end of treatment, patient remained:   Up in chair     Up in wheelchair in room   x In bed   x With alarm activated   x Bed rails up   x Call bell in reach    x Family/friends present    Restraints secured properly    In bathroom with CNA/RN notified   x Nurse aware    In gym with therapist/tech    Other:

## 2023-09-27 VITALS
HEART RATE: 95 BPM | WEIGHT: 219.81 LBS | HEIGHT: 68 IN | BODY MASS INDEX: 33.31 KG/M2 | SYSTOLIC BLOOD PRESSURE: 142 MMHG | RESPIRATION RATE: 18 BRPM | DIASTOLIC BLOOD PRESSURE: 92 MMHG | OXYGEN SATURATION: 99 % | TEMPERATURE: 98 F

## 2023-09-27 PROBLEM — A41.9 SEVERE SEPSIS: Status: RESOLVED | Noted: 2022-07-18 | Resolved: 2023-09-27

## 2023-09-27 PROBLEM — J96.01 ACUTE HYPOXEMIC RESPIRATORY FAILURE: Status: RESOLVED | Noted: 2022-07-18 | Resolved: 2023-09-27

## 2023-09-27 PROBLEM — R65.20 SEVERE SEPSIS: Status: RESOLVED | Noted: 2022-07-18 | Resolved: 2023-09-27

## 2023-09-27 PROBLEM — I46.9 CARDIAC ARREST: Status: RESOLVED | Noted: 2022-07-18 | Resolved: 2023-09-27

## 2023-09-27 LAB
POCT GLUCOSE: 159 MG/DL (ref 70–110)
POCT GLUCOSE: 170 MG/DL (ref 70–110)

## 2023-09-27 PROCEDURE — 97530 THERAPEUTIC ACTIVITIES: CPT

## 2023-09-27 PROCEDURE — 25000003 PHARM REV CODE 250: Performed by: STUDENT IN AN ORGANIZED HEALTH CARE EDUCATION/TRAINING PROGRAM

## 2023-09-27 PROCEDURE — 25000003 PHARM REV CODE 250: Performed by: INTERNAL MEDICINE

## 2023-09-27 PROCEDURE — 63600175 PHARM REV CODE 636 W HCPCS: Performed by: STUDENT IN AN ORGANIZED HEALTH CARE EDUCATION/TRAINING PROGRAM

## 2023-09-27 RX ORDER — ASPIRIN 81 MG/1
81 TABLET ORAL DAILY
Qty: 360 TABLET | Refills: 0 | Status: ON HOLD | OUTPATIENT
Start: 2023-09-28 | End: 2024-09-27

## 2023-09-27 RX ORDER — BUDESONIDE 0.5 MG/2ML
0.5 INHALANT ORAL DAILY PRN
Qty: 1 ML | Refills: 0 | Status: ON HOLD | OUTPATIENT
Start: 2023-09-27 | End: 2024-09-26

## 2023-09-27 RX ORDER — CARVEDILOL 12.5 MG/1
12.5 TABLET ORAL 2 TIMES DAILY WITH MEALS
Qty: 60 TABLET | Refills: 11 | Status: ON HOLD | OUTPATIENT
Start: 2023-09-27 | End: 2024-09-26

## 2023-09-27 RX ORDER — HYDROCODONE BITARTRATE AND ACETAMINOPHEN 10; 325 MG/1; MG/1
1 TABLET ORAL EVERY 4 HOURS PRN
Qty: 42 TABLET | Refills: 0 | Status: SHIPPED | OUTPATIENT
Start: 2023-09-27 | End: 2023-10-04

## 2023-09-27 RX ORDER — BISACODYL 10 MG
10 SUPPOSITORY, RECTAL RECTAL DAILY PRN
Qty: 30 SUPPOSITORY | Refills: 0 | Status: ON HOLD | OUTPATIENT
Start: 2023-09-27

## 2023-09-27 RX ORDER — ASCORBIC ACID 500 MG
500 TABLET ORAL DAILY
Qty: 30 TABLET | Refills: 0 | Status: ON HOLD | OUTPATIENT
Start: 2023-09-28

## 2023-09-27 RX ORDER — FERROUS SULFATE 325(65) MG
325 TABLET, DELAYED RELEASE (ENTERIC COATED) ORAL DAILY
Qty: 30 TABLET | Refills: 0 | Status: ON HOLD | OUTPATIENT
Start: 2023-09-27

## 2023-09-27 RX ORDER — POLYETHYLENE GLYCOL 3350 17 G/17G
17 POWDER, FOR SOLUTION ORAL 3 TIMES DAILY PRN
Qty: 30 EACH | Refills: 0 | Status: ON HOLD | OUTPATIENT
Start: 2023-09-27

## 2023-09-27 RX ADMIN — FERROUS SULFATE TAB 325 MG (65 MG ELEMENTAL FE) 1 EACH: 325 (65 FE) TAB at 08:09

## 2023-09-27 RX ADMIN — LIDOCAINE HYDROCHLORIDE 10 ML: 20 JELLY TOPICAL at 05:09

## 2023-09-27 RX ADMIN — OXYCODONE HYDROCHLORIDE AND ACETAMINOPHEN 500 MG: 500 TABLET ORAL at 08:09

## 2023-09-27 RX ADMIN — ASPIRIN 81 MG: 81 TABLET, COATED ORAL at 08:09

## 2023-09-27 RX ADMIN — SITAGLIPTIN 100 MG: 50 TABLET, FILM COATED ORAL at 08:09

## 2023-09-27 RX ADMIN — SODIUM CHLORIDE: 9 INJECTION, SOLUTION INTRAVENOUS at 04:09

## 2023-09-27 RX ADMIN — MEROPENEM 1 G: 1 INJECTION, POWDER, FOR SOLUTION INTRAVENOUS at 04:09

## 2023-09-27 RX ADMIN — Medication 1 ENEMA: at 05:09

## 2023-09-27 RX ADMIN — MULTIPLE VITAMINS W/ MINERALS TAB 1 TABLET: TAB at 08:09

## 2023-09-27 RX ADMIN — CARVEDILOL 12.5 MG: 12.5 TABLET, FILM COATED ORAL at 08:09

## 2023-09-27 NOTE — NURSING
Discharge instructions explained to family and patient. All questions answered at this time. PICC removal done by Margot Georges RN. Catheter intact. Pt wheeled out in personal wheelchair with all belongings.

## 2023-09-27 NOTE — PLAN OF CARE
Problem: Adult Inpatient Plan of Care  Goal: Plan of Care Review  Outcome: Met  Flowsheets (Taken 9/27/2023 1032)  Plan of Care Reviewed With:   patient   mother  Goal: Patient-Specific Goal (Individualized)  Outcome: Met  Flowsheets (Taken 9/27/2023 1032)  Anxieties, Fears or Concerns: Anxious to go home,  Individualized Care Needs: Wound  care, Monitor Blood pressure  Goal: Absence of Hospital-Acquired Illness or Injury  Outcome: Met  Intervention: Identify and Manage Fall Risk  Flowsheets (Taken 9/27/2023 1032)  Safety Promotion/Fall Prevention:   assistive device/personal item within reach   bed alarm set   family to remain at bedside  Intervention: Prevent Skin Injury  Flowsheets (Taken 9/27/2023 1032)  Body Position: sitting up in bed  Skin Protection:   adhesive use limited   incontinence pads utilized   tubing/devices free from skin contact  Intervention: Prevent and Manage VTE (Venous Thromboembolism) Risk  Flowsheets (Taken 9/27/2023 1032)  Activity Management: Patient unable to perform activities  VTE Prevention/Management:   bleeding precations maintained   bleeding risk assessed  Range of Motion: active ROM (range of motion) encouraged  Intervention: Prevent Infection  Flowsheets (Taken 9/27/2023 1032)  Infection Prevention:   personal protective equipment utilized   rest/sleep promoted   single patient room provided  Goal: Optimal Comfort and Wellbeing  Outcome: Met  Intervention: Monitor Pain and Promote Comfort  Flowsheets (Taken 9/27/2023 1032)  Pain Management Interventions:   care clustered   quiet environment facilitated   pillow support provided   position adjusted   relaxation techniques promoted  Intervention: Provide Person-Centered Care  Flowsheets (Taken 9/27/2023 1032)  Trust Relationship/Rapport:   care explained   choices provided   emotional support provided   empathic listening provided   questions answered   questions encouraged   reassurance provided   thoughts/feelings  acknowledged  Goal: Readiness for Transition of Care  Outcome: Met  Intervention: Mutually Develop Transition Plan  Flowsheets (Taken 9/27/2023 1032)  Communicated SADE with patient/caregiver: Date not available/Unable to determine     Problem: Diabetes Comorbidity  Goal: Blood Glucose Level Within Targeted Range  Outcome: Met  Intervention: Monitor and Manage Glycemia  Flowsheets (Taken 9/27/2023 1032)  Glycemic Management: blood glucose monitored     Problem: Infection  Goal: Absence of Infection Signs and Symptoms  Outcome: Met  Intervention: Prevent or Manage Infection  Flowsheets (Taken 9/27/2023 1032)  Fever Reduction/Comfort Measures:   lightweight clothing   lightweight bedding  Infection Management: aseptic technique maintained  Isolation Precautions:   contact   precautions maintained     Problem: Impaired Wound Healing  Goal: Optimal Wound Healing  Outcome: Met  Intervention: Promote Wound Healing  Flowsheets (Taken 9/27/2023 1032)  Oral Nutrition Promotion:   calorie-dense foods provided   calorie-dense liquids provided  Sleep/Rest Enhancement: relaxation techniques promoted  Activity Management: Patient unable to perform activities  Pain Management Interventions:   care clustered   quiet environment facilitated   pillow support provided   position adjusted   relaxation techniques promoted     Problem: Skin Injury Risk Increased  Goal: Skin Health and Integrity  Outcome: Met  Intervention: Optimize Skin Protection  Flowsheets (Taken 9/27/2023 1032)  Pressure Reduction Techniques: weight shift assistance provided  Pressure Reduction Devices: heel offloading device utilized  Skin Protection:   adhesive use limited   incontinence pads utilized   tubing/devices free from skin contact  Head of Bed (HOB) Positioning: HOB at 30-45 degrees  Intervention: Promote and Optimize Oral Intake  Flowsheets (Taken 9/27/2023 1032)  Oral Nutrition Promotion:   calorie-dense foods provided   calorie-dense liquids provided      Problem: Fall Injury Risk  Goal: Absence of Fall and Fall-Related Injury  Outcome: Met  Intervention: Identify and Manage Contributors  Flowsheets (Taken 9/27/2023 1032)  Self-Care Promotion:   independence encouraged   BADL personal objects within reach   BADL personal routines maintained   safe use of adaptive equipment encouraged   meal set-up provided  Medication Review/Management: medications reviewed  Intervention: Promote Injury-Free Environment  Flowsheets (Taken 9/27/2023 1032)  Safety Promotion/Fall Prevention:   assistive device/personal item within reach   bed alarm set   family to remain at bedside     Problem: Mobility Impairment  Goal: Optimal Mobility  Outcome: Met  Intervention: Optimize Mobility  Flowsheets (Taken 9/27/2023 1032)  Assistive Device Utilized: lift device  Activity Management: Patient unable to perform activities  Positioning/Transfer Devices:   pillows   in use
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
## 2023-09-27 NOTE — PT/OT/SLP PROGRESS
Physical Therapy Treatment Note           Name: nAtonio Galvan II    : 1965 (57 y.o.)  MRN: [id number]           TREATMENT SUMMARY AND RECOMMENDATIONS:    PT Received On: 23  PT Start Time: 1118     PT Stop Time: 1148  PT Total Time (min): 30 min     Subjective Assessment:   No complaints  Lethargic   x Awake, alert, cooperative  Uncooperative    Agitated  c/o pain    Appropriate  c/o fatigue    Confused x Treated at bedside     Emotionally labile  Treated in gym/dept.    Impulsive  Other:    Flat affect       Therapy Precautions:    Cognitive deficits  Spinal precautions    Collar - hard  Sternal precautions    Collar - soft   TLSO    Fall risk  LSO    Hip precautions - posterior  Knee immobilizer    Hip precautions - anterior  WBAT    Impaired communication  Partial weightbearing    Oxygen  TTWB    PEG tube  NWB    Visual deficits x Other:Contact precautions, B greater trochanter wounds    Hearing deficits  x Quadriplegia        Treatment Objectives:     Mobility Training:   Assist level Comments    Bed mobility Total A Supine<>sit and rolling side to side x 4 times each direction to get pt dressed, place tess pad and perform cleaning tasks post BM   Transfer Total A Tess for bed>WC transfers    Gait     Sit to stand transitions     Sitting balance     Standing balance      Wheelchair mobility     Car transfer     Other:          Therapeutic Exercise:   Exercise Sets Reps Comments                               Additional Comments:  Nursing called PT to assist with tess transfers due pt being Dc'd. Upon entry pt was not ready. Pt had a BM, was not dressed and still needed help switching to leg catheter. Nursing was called, but not available. All tasks completed with therapy, mom and nursing student. Other nursing staff entered when time to use tess to assist with proper placement/positioning in WC. Good tolerance demonstrated. Mom in room finishing packing and upper body dressing tasks.      Assessment: Patient tolerated session well.    PT Plan: continue per POC  Revisions made to plan of care: No    GOALS:   Multidisciplinary Problems       Physical Therapy Goals          Problem: Physical Therapy    Goal Priority Disciplines Outcome Goal Variances Interventions   Physical Therapy Goal     PT, PT/OT Ongoing, Progressing     Description: Goals to be met by: Discharge     Patient will increase functional independence with mobility by performin.  Pt to receive PROM BLEs and UEs 5-6 d/wk, qd,  to prevent further contractures and ensure ability for positioning in WC, along with proper positioning to reduce further skin break down  2.  Pt to tolerate sitting EOB x 30 min with normal BP to be demonstrated in preparation for progression to OOB>WC                       Skilled PT Minutes Provided: 30   Communication with Treatment Team:     Equipment recommendations:       At end of treatment, patient remained:   Up in chair    x Up in wheelchair in room    In bed    With alarm activated    Bed rails up    Call bell in reach    x Family/friends present    Restraints secured properly    In bathroom with CNA/RN notified   x Nurse aware    In gym with therapist/tech    Other:

## 2023-09-28 ENCOUNTER — PATIENT OUTREACH (OUTPATIENT)
Dept: ADMINISTRATIVE | Facility: CLINIC | Age: 58
End: 2023-09-28
Payer: MEDICARE

## 2023-09-28 NOTE — PROGRESS NOTES
C3 nurse spoke with Antonio Galvan II for a TCC post hospital discharge follow up call. The patient has a scheduled [facility] appointment with Jennifer Fernando NP (Family Medicine) on 10/5/2023; @ 2:00 and [facility] (Wound Care) on 10/10/2023;@ 2:30.

## 2023-09-29 NOTE — PT/OT/SLP DISCHARGE
Physical Therapy Discharge Summary    Name: Antonio Galvan II  MRN: [id number]   Principal Problem: Open wound of left hip     Patient Discharged from acute Physical Therapy on .  Please refer to prior PT noted date on  for functional status.     Assessment:     Patient appropriate for care in another setting.    Objective:     GOALS:   Multidisciplinary Problems       Physical Therapy Goals          Problem: Physical Therapy    Goal Priority Disciplines Outcome Goal Variances Interventions   Physical Therapy Goal     PT, PT/OT Ongoing, Progressing     Description: Goals to be met by: Discharge     Patient will increase functional independence with mobility by performin.  Pt to receive PROM BLEs and UEs 5-6 d/wk, qd,  to prevent further contractures and ensure ability for positioning in WC, along with proper positioning to reduce further skin break down  2.  Pt to tolerate sitting EOB x 30 min with normal BP to be demonstrated in preparation for progression to OOB>WC                       Reasons for Discontinuation of Therapy Services  Transfer to alternate level of care.      Plan:     Patient Discharged to: Home no PT services needed.      2023

## 2023-10-03 ENCOUNTER — HOSPITAL ENCOUNTER (OUTPATIENT)
Dept: WOUND CARE | Facility: HOSPITAL | Age: 58
Discharge: HOME | End: 2023-10-03
Attending: STUDENT IN AN ORGANIZED HEALTH CARE EDUCATION/TRAINING PROGRAM
Payer: MEDICARE

## 2023-10-03 VITALS
RESPIRATION RATE: 18 BRPM | OXYGEN SATURATION: 100 % | TEMPERATURE: 99 F | DIASTOLIC BLOOD PRESSURE: 66 MMHG | HEART RATE: 101 BPM | SYSTOLIC BLOOD PRESSURE: 92 MMHG

## 2023-10-03 DIAGNOSIS — L89.613 PRESSURE INJURY OF RIGHT HEEL, STAGE 3: ICD-10-CM

## 2023-10-03 DIAGNOSIS — L89.44 PRESSURE INJURY OF CONTIGUOUS REGION INVOLVING BACK AND BUTTOCK, STAGE 4, UNSPECIFIED LATERALITY: ICD-10-CM

## 2023-10-03 DIAGNOSIS — L89.224 PRESSURE INJURY OF TROCHANTERIC REGION OF LEFT HIP, STAGE 4: ICD-10-CM

## 2023-10-03 DIAGNOSIS — L89.153 PRESSURE ULCER OF SACRAL REGION, STAGE 3: ICD-10-CM

## 2023-10-03 DIAGNOSIS — L89.314 PRESSURE INJURY OF RIGHT ISCHIUM, STAGE 4: Primary | ICD-10-CM

## 2023-10-03 DIAGNOSIS — L89.520 PRESSURE INJURY OF LEFT ANKLE, UNSTAGEABLE: ICD-10-CM

## 2023-10-03 PROCEDURE — 99214 OFFICE O/P EST MOD 30 MIN: CPT

## 2023-10-03 PROCEDURE — 99213 PR OFFICE/OUTPT VISIT, EST, LEVL III, 20-29 MIN: ICD-10-PCS | Mod: ,,, | Performed by: PEDIATRICS

## 2023-10-03 PROCEDURE — 27000999 HC MEDICAL RECORD PHOTO DOCUMENTATION

## 2023-10-03 PROCEDURE — 99213 OFFICE O/P EST LOW 20 MIN: CPT | Mod: ,,, | Performed by: PEDIATRICS

## 2023-10-03 NOTE — PATIENT INSTRUCTIONS
Sacrum: cleanse with wound cleanser, apply silver alginate, cover with gauze, abd prn, tape, change daily and prn soilage   Left hip: cleanse with wound cleanser, apply oil emulsion gauze (adaptic)to wound bed, lightly pack Aquacel ag into space within adaptic, cover with dry 4x4 gauze, and ABD, secure with tape, change daily and prn soilage   Right posterior thigh: cleanse with wound cleanser, apply Aquacel ag to wound base, cover with 4x4 and ABD pad, secure with tape, change daily.  Right heel/left achilles/left anterior foot: cleanse with wound cleanser, paint areas with betadine, allow to dry, cover with gauze, abd, wrap lightly with kerlix, secure with tape, change every other day

## 2023-10-04 DIAGNOSIS — L89.314 PRESSURE INJURY OF RIGHT ISCHIUM, STAGE 4: Primary | ICD-10-CM

## 2023-10-04 NOTE — ADDENDUM NOTE
Encounter addended by: Ronald Barcenas MD on: 10/4/2023 2:13 PM   Actions taken: Clinical Note Signed

## 2023-10-09 NOTE — PATIENT INSTRUCTIONS
Sacrum / R posterior thigh, L hip:  Cleanse with wound cleanser, pat dry, apply skin prep to periwound, allow to dry, apply vashe moistened mesalt to open area, cover with gauze, abd pad, tape  Change daily and prn soilage    Right heel/left achilles/left anterior foot: cleanse with wound cleanser, paint areas with betadine, allow to dry, cover with gauze, abd, wrap lightly with kerlix, secure with tape, change every other day     Cipro and take as prescribed.  Antibiotics may be changed when final culture results returned    MD recommends, delaying evaluation for skin substitute due to high suspicion of infection to sacral region and deterioration of wound bed.       Continue to offload, turn q2hrs, moisture management

## 2023-10-10 ENCOUNTER — HOSPITAL ENCOUNTER (OUTPATIENT)
Dept: WOUND CARE | Facility: HOSPITAL | Age: 58
Discharge: HOME | End: 2023-10-10
Attending: STUDENT IN AN ORGANIZED HEALTH CARE EDUCATION/TRAINING PROGRAM
Payer: MEDICARE

## 2023-10-10 VITALS
RESPIRATION RATE: 18 BRPM | TEMPERATURE: 98 F | HEART RATE: 93 BPM | OXYGEN SATURATION: 100 % | DIASTOLIC BLOOD PRESSURE: 71 MMHG | SYSTOLIC BLOOD PRESSURE: 100 MMHG

## 2023-10-10 DIAGNOSIS — L89.224 PRESSURE INJURY OF TROCHANTERIC REGION OF LEFT HIP, STAGE 4: ICD-10-CM

## 2023-10-10 DIAGNOSIS — L89.520 PRESSURE INJURY OF LEFT ANKLE, UNSTAGEABLE: ICD-10-CM

## 2023-10-10 DIAGNOSIS — L89.150 UNSTAGEABLE PRESSURE ULCER OF SACRAL REGION: ICD-10-CM

## 2023-10-10 DIAGNOSIS — L89.314 PRESSURE INJURY OF RIGHT ISCHIUM, STAGE 4: Primary | ICD-10-CM

## 2023-10-10 DIAGNOSIS — L89.44 PRESSURE INJURY OF CONTIGUOUS REGION INVOLVING BACK AND BUTTOCK, STAGE 4, UNSPECIFIED LATERALITY: ICD-10-CM

## 2023-10-10 DIAGNOSIS — L89.613 PRESSURE INJURY OF RIGHT HEEL, STAGE 3: ICD-10-CM

## 2023-10-10 PROCEDURE — 99213 OFFICE O/P EST LOW 20 MIN: CPT | Mod: ,,, | Performed by: PEDIATRICS

## 2023-10-10 PROCEDURE — 87070 CULTURE OTHR SPECIMN AEROBIC: CPT

## 2023-10-10 PROCEDURE — 99214 OFFICE O/P EST MOD 30 MIN: CPT

## 2023-10-10 PROCEDURE — 99213 PR OFFICE/OUTPT VISIT, EST, LEVL III, 20-29 MIN: ICD-10-PCS | Mod: ,,, | Performed by: PEDIATRICS

## 2023-10-10 PROCEDURE — 27000999 HC MEDICAL RECORD PHOTO DOCUMENTATION

## 2023-10-10 RX ORDER — CIPROFLOXACIN 500 MG/1
500 TABLET ORAL EVERY 12 HOURS
Qty: 20 TABLET | Refills: 0 | Status: SHIPPED | OUTPATIENT
Start: 2023-10-10 | End: 2023-10-20

## 2023-10-10 NOTE — PROGRESS NOTES
Subjective:       Patient ID: Antonio Galvan II is a 57 y.o. male.    Chief Complaint: Pressure Ulcer (R hip / L hip/ sacrum, R and L foot ulcerations.   Follow up with md for re-evaluation and treatment with md)    HPI  Review of Systems      Objective:      Temp:  [98.2 °F (36.8 °C)]   Pulse:  [93]   Resp:  [18]   BP: (100)/(71)   SpO2:  [100 %]   Physical Exam       Altered Skin Integrity 05/06/22 1140 Right Heel  Full thickness tissue loss. Subcutaneous fat may be visible but bone, tendon or muscle are not exposed (Active)   05/06/22 1140   Altered Skin Integrity Present on Admission - Did Patient arrive to the hospital with altered skin?: yes   Side: Right   Orientation:    Location: Heel   Wound Number:    Is this injury device related?: No   Primary Wound Type:    Description of Altered Skin Integrity: Full thickness tissue loss. Subcutaneous fat may be visible but bone, tendon or muscle are not exposed   Ankle-Brachial Index:    Pulses:    Removal Indication and Assessment:    Wound Outcome:    (Retired) Wound Length (cm):    (Retired) Wound Width (cm):    (Retired) Depth (cm):    Wound Description (Comments):    Removal Indications:    Wound Image   10/10/23 1525   Dressing Appearance Intact;Dried drainage 10/10/23 1525   Drainage Amount Scant 10/10/23 1525   Drainage Characteristics/Odor Sanguineous;No odor;Bleeding controlled 10/10/23 1525   Appearance Red;Granulating 10/10/23 1525   Tissue loss description Full thickness 10/10/23 1525   Red (%), Wound Tissue Color 100 % 10/10/23 1525   Periwound Area Scar tissue 10/10/23 1525   Wound Edges Defined 10/10/23 1525   Wound Length (cm) 0.5 cm 10/10/23 1525   Wound Width (cm) 1.4 cm 10/10/23 1525   Wound Depth (cm) 0.1 cm 10/10/23 1525   Wound Volume (cm^3) 0.07 cm^3 10/10/23 1525   Wound Surface Area (cm^2) 0.7 cm^2 10/10/23 1525   Care Cleansed with:;Antimicrobial agent;Applied:;Povidone iodine 10/10/23 1525   Dressing Applied;Gauze;Absorptive  Pad;Rolled gauze 10/10/23 1525   Periwound Care Dry periwound area maintained 10/10/23 1525            Altered Skin Integrity 01/12/23 1530 Sacral spine Full thickness tissue loss with exposed bone, tendon, or muscle. Often includes undermining and tunneling. May extend into muscle and/or supporting structures. (Active)   01/12/23 1530   Altered Skin Integrity Present on Admission - Did Patient arrive to the hospital with altered skin?: yes   Side:    Orientation:    Location: Sacral spine   Wound Number:    Is this injury device related?:    Primary Wound Type:    Description of Altered Skin Integrity: Full thickness tissue loss with exposed bone, tendon, or muscle. Often includes undermining and tunneling. May extend into muscle and/or supporting structures.   Ankle-Brachial Index:    Pulses:    Removal Indication and Assessment:    Wound Outcome:    (Retired) Wound Length (cm):    (Retired) Wound Width (cm):    (Retired) Depth (cm):    Wound Description (Comments):    Removal Indications:    Wound Image     10/10/23 1525   Description of Altered Skin Integrity Full thickness tissue loss. Base is covered by slough and/or eschar in the wound bed 10/10/23 1525   Dressing Appearance Intact;Moist drainage 10/10/23 1525   Drainage Amount Moderate 10/10/23 1525   Drainage Characteristics/Odor Green;Yellow;Malodorous 10/10/23 1525   Appearance Maroon;Purple;Gray;Tan;Slough;Necrotic;Wet;Other (see comments) 10/10/23 1525   Tissue loss description Full thickness 10/10/23 1525   Periwound Area Scar tissue;Macerated;Excoriated;Ecchymotic 10/10/23 1525   Wound Edges Jagged;Irregular 10/10/23 1525   Wound Length (cm) 12 cm 10/10/23 1525   Wound Width (cm) 9.5 cm 10/10/23 1525   Wound Depth (cm) 1 cm 10/10/23 1525   Wound Volume (cm^3) 114 cm^3 10/10/23 1525   Wound Surface Area (cm^2) 114 cm^2 10/10/23 1525   Care Cleansed with:;Antimicrobial agent;Applied:;Other (see comments) 10/10/23 1525   Dressing Applied;Sodium chloride  impregnated;Gauze;Absorptive Pad;Other (comment) 10/10/23 1525   Periwound Care Skin barrier film applied 10/10/23 1525            Altered Skin Integrity 08/01/23 1534 Left posterior Ankle Full thickness tissue loss. Base is covered by slough and/or eschar in the wound bed (Active)   08/01/23 1534   Altered Skin Integrity Present on Admission - Did Patient arrive to the hospital with altered skin?: yes   Side: Left   Orientation: posterior   Location: Ankle   Wound Number:    Is this injury device related?:    Primary Wound Type:    Description of Altered Skin Integrity: Full thickness tissue loss. Base is covered by slough and/or eschar in the wound bed   Ankle-Brachial Index:    Pulses: 2 + palpable, strong doppler signal per md   Removal Indication and Assessment:    Wound Outcome:    (Retired) Wound Length (cm):    (Retired) Wound Width (cm):    (Retired) Depth (cm):    Wound Description (Comments):    Removal Indications:    Wound Image   10/10/23 1525   Dressing Appearance Intact;Dry 10/10/23 1525   Drainage Amount None 10/10/23 1525   Appearance Black;Eschar;Dry 10/10/23 1525   Tissue loss description Full thickness 10/10/23 1525   Black (%), Wound Tissue Color 100 % 10/10/23 1525   Periwound Area Dry 10/10/23 1525   Wound Edges Defined 10/10/23 1525   Wound Length (cm) 3.5 cm 10/10/23 1525   Wound Width (cm) 3 cm 10/10/23 1525   Wound Surface Area (cm^2) 10.5 cm^2 10/10/23 1525   Care Cleansed with:;Applied:;Povidone iodine 10/10/23 1525   Dressing Applied;Gauze;Absorptive Pad;Rolled gauze 10/10/23 1525            Altered Skin Integrity 08/01/23 1535 Left anterior Ankle Other (comment) Full thickness tissue loss. Base is covered by slough and/or eschar in the wound bed (Active)   08/01/23 1535   Altered Skin Integrity Present on Admission - Did Patient arrive to the hospital with altered skin?: yes   Side: Left   Orientation: anterior   Location: Ankle   Wound Number:    Is this injury device related?: No    Primary Wound Type: Other   Description of Altered Skin Integrity: Full thickness tissue loss. Base is covered by slough and/or eschar in the wound bed   Ankle-Brachial Index:    Pulses: 2+ palpable and strong doppler pulses per MD   Removal Indication and Assessment:    Wound Outcome:    (Retired) Wound Length (cm):    (Retired) Wound Width (cm):    (Retired) Depth (cm):    Wound Description (Comments):    Removal Indications:    Wound Image   10/10/23 1525   Dressing Appearance Intact;Dry 10/10/23 1525   Drainage Amount None 10/10/23 1525   Drainage Characteristics/Odor No odor 10/10/23 1525   Appearance Black;Dry;Eschar 10/10/23 1525   Tissue loss description Full thickness 10/10/23 1525   Black (%), Wound Tissue Color 100 % 10/10/23 1525   Periwound Area Dry;Scar tissue 10/10/23 1525   Wound Edges Defined 10/10/23 1525   Wound Length (cm) 3 cm 10/10/23 1525   Wound Width (cm) 2.5 cm 10/10/23 1525   Wound Surface Area (cm^2) 7.5 cm^2 10/10/23 1525   Care Cleansed with:;Applied:;Povidone iodine 10/10/23 1525   Dressing Applied;Gauze;Absorptive Pad;Rolled gauze 10/10/23 1525   Periwound Care Dry periwound area maintained 10/10/23 1525            Incision/Site 08/16/23 2011 Right Hip posterior (Active)   08/16/23 2011   Present Prior to Hospital Arrival?:    Side: Right   Location: Hip   Orientation: posterior   Incision Type:    Closure Method:    Additional Comments:    Removal Indication and Assessment:    Wound Outcome:    Removal Indications:    Wound Image   10/10/23 1525   Dressing Appearance Intact;Moist drainage 10/10/23 1525   Drainage Amount Moderate 10/10/23 1525   Drainage Characteristics/Odor Creamy;Tan;Bleeding controlled;No odor 10/10/23 1525   Appearance Red;Not granulating;Ecchymotic;Yellow;Slough;Tan 10/10/23 1525   Red (%), Wound Tissue Color 75 % 10/10/23 1525   Periwound Area Dry;Scar tissue 10/10/23 1525   Wound Edges Defined 10/10/23 1525   Wound Length (cm) 9.5 cm 10/10/23 1525   Wound  Width (cm) 15 cm 10/10/23 1525   Wound Depth (cm) 3 cm 10/10/23 1525   Wound Volume (cm^3) 427.5 cm^3 10/10/23 1525   Wound Surface Area (cm^2) 142.5 cm^2 10/10/23 1525   Care Cleansed with:;Antimicrobial agent 10/10/23 1525   Dressing Applied;Sodium chloride impregnated;Gauze;Absorptive Pad;Other (comment) 10/10/23 1525            Incision/Site 08/16/23 2011 Left Hip lateral (Active)   08/16/23 2011   Present Prior to Hospital Arrival?:    Side: Left   Location: Hip   Orientation: lateral   Incision Type:    Closure Method:    Additional Comments:    Removal Indication and Assessment:    Wound Outcome:    Removal Indications:    Wound Image   10/10/23 1525   Dressing Appearance Intact;Moist drainage 10/10/23 1525   Drainage Amount Moderate 10/10/23 1525   Drainage Characteristics/Odor Serosanguineous;Bleeding controlled;No odor 10/10/23 1525   Appearance Red;Not granulating;Yellow;Tan;Slough;Other (see comments) 10/10/23 1525   Red (%), Wound Tissue Color 75 % 10/10/23 1525   Yellow (%), Wound Tissue Color 25 % 10/10/23 1525   Periwound Area Intact 10/10/23 1525   Wound Length (cm) 8.9 cm 10/10/23 1525   Wound Width (cm) 3.5 cm 10/10/23 1525   Wound Depth (cm) 2.5 cm 10/10/23 1525   Wound Volume (cm^3) 77.875 cm^3 10/10/23 1525   Wound Surface Area (cm^2) 31.15 cm^2 10/10/23 1525   Undermining (depth (cm)/location) 2.5cm/ from 2 to 5 o'clock - greatest at 3 o'clock / from 8 to 10 o'clock / 1.5cm at 9 o'clock 10/10/23 1525   Care Cleansed with:;Antimicrobial agent 10/10/23 1525   Dressing Applied;Sodium chloride impregnated;Gauze;Absorptive Pad;Other (comment) 10/10/23 1525   Packing packed with;other (see comment) 10/10/23 1525         Assessment:     Patient returns with enlarged macerated and denuded area with eschar and necrotic tissue sacral area.  Patient had area with large amount of yellow drainage.  Culture was taken an area cleaned and Vashe moistened Mesalt was applied.  Of both heels wounds  are  improving.  Today right posterior thigh wound is having yellow drainage and is pale.  Left ischial area also is pale.  These areas were dressed with Vashe moistened Mesalt.  Patient was started on Cipro and will continue that until cultures return.  Because of this we will put skin substitute on hold for now.  Patient will return in 1 week for physician visit    ICD-10-CM ICD-9-CM   1. Pressure injury of right ischium, stage 4  L89.314 707.05     707.24   2. 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, stage 4, unspecified laterality  L89.44 707.09     707.24   3. Pressure injury of trochanteric region of left hip, stage 4  L89.224 707.04     707.24   4. Unstageable pressure ulcer of sacral region  L89.150 707.03     707.25   5. Pressure injury of right heel, stage 3  L89.613 707.07     707.23   6. Pressure injury of left ankle, unstageable  L89.520 707.06     707.25         Plan:   Tissue pathology and/or culture taken:  [x] Yes [] No   Sharp debridement performed:   [] Yes [x] No   Labs ordered this visit:   [] Yes [x] No   Imaging ordered this visit:   [] Yes [x] No           Orders Placed This Encounter   Procedures    Wound Culture     sacrum    Change dressing     Sacrum / R posterior thigh, L hip:  Cleanse with wound cleanser, pat dry, apply skin prep to periwound, allow to dry, apply vashe moistened mesalt to open area, cover with gauze, abd pad, tape  Change daily and prn soilage    Right heel/left achilles/left anterior foot: cleanse with wound cleanser, paint areas with betadine, allow to dry, cover with gauze, abd, wrap lightly with kerlix, secure with tape, change every other day    Change dressing     Sacrum / R posterior thigh, L hip:  Cleanse with wound cleanser, pat dry, apply skin prep to periwound, allow to dry, apply vashe moistened mesalt to open area, cover with gauze, abd pad, tape  Change daily and prn soilage    Right heel/left achilles/left anterior foot: cleanse with  wound cleanser, paint areas with betadine, allow to dry, cover with gauze, abd, wrap lightly with kerlix, secure with tape, change every other day     Cipro and take as prescribed.  Antibiotics may be changed when final culture results returned    MD recommends, delaying evaluation for skin substitute due to high suspicion of infection to sacral region and deterioration of wound bed.       Continue to offload, turn q2hrs, moisture management        Follow up in about 1 week (around 10/17/2023) for md visit .

## 2023-10-14 LAB
BACTERIA WND CULT: ABNORMAL

## 2023-10-16 DIAGNOSIS — L89.44 PRESSURE INJURY OF CONTIGUOUS REGION INVOLVING BACK AND BUTTOCK, STAGE 4, UNSPECIFIED LATERALITY: ICD-10-CM

## 2023-10-16 DIAGNOSIS — L89.224 PRESSURE INJURY OF TROCHANTERIC REGION OF LEFT HIP, STAGE 4: ICD-10-CM

## 2023-10-16 DIAGNOSIS — L89.314 PRESSURE INJURY OF RIGHT ISCHIUM, STAGE 4: Primary | ICD-10-CM

## 2023-10-16 NOTE — PROGRESS NOTES
Culture report called to Dr. Barcenas.   Orders received for pt to continue Cipro and Doxycycline orally.   Orders noted to send culture report to [facility] for topical compound for sacrum/R and L hip ulcerations.      Spoke to pt per telephone , instructed him on continuing both oral antibiotics as prescribed

## 2023-10-17 ENCOUNTER — HOSPITAL ENCOUNTER (OUTPATIENT)
Dept: WOUND CARE | Facility: HOSPITAL | Age: 58
Discharge: HOME | End: 2023-10-17
Attending: STUDENT IN AN ORGANIZED HEALTH CARE EDUCATION/TRAINING PROGRAM
Payer: MEDICARE

## 2023-10-17 VITALS
SYSTOLIC BLOOD PRESSURE: 56 MMHG | OXYGEN SATURATION: 96 % | DIASTOLIC BLOOD PRESSURE: 34 MMHG | RESPIRATION RATE: 20 BRPM | HEART RATE: 136 BPM | TEMPERATURE: 98 F

## 2023-10-17 DIAGNOSIS — L89.224 PRESSURE INJURY OF TROCHANTERIC REGION OF LEFT HIP, STAGE 4: ICD-10-CM

## 2023-10-17 DIAGNOSIS — L89.150 UNSTAGEABLE PRESSURE ULCER OF SACRAL REGION: ICD-10-CM

## 2023-10-17 DIAGNOSIS — L89.314 PRESSURE INJURY OF RIGHT ISCHIUM, STAGE 4: Primary | Chronic | ICD-10-CM

## 2023-10-17 DIAGNOSIS — L89.520 PRESSURE INJURY OF LEFT ANKLE, UNSTAGEABLE: ICD-10-CM

## 2023-10-17 DIAGNOSIS — L89.613 PRESSURE INJURY OF RIGHT HEEL, STAGE 3: ICD-10-CM

## 2023-10-17 DIAGNOSIS — L89.44 PRESSURE INJURY OF CONTIGUOUS REGION INVOLVING BACK AND BUTTOCK, STAGE 4, UNSPECIFIED LATERALITY: ICD-10-CM

## 2023-10-17 DIAGNOSIS — D50.0 CHRONIC BLOOD LOSS ANEMIA: Chronic | ICD-10-CM

## 2023-10-17 PROCEDURE — 27000999 HC MEDICAL RECORD PHOTO DOCUMENTATION

## 2023-10-17 PROCEDURE — 99213 PR OFFICE/OUTPT VISIT, EST, LEVL III, 20-29 MIN: ICD-10-PCS | Mod: ,,, | Performed by: PEDIATRICS

## 2023-10-17 PROCEDURE — 99213 OFFICE O/P EST LOW 20 MIN: CPT | Mod: ,,, | Performed by: PEDIATRICS

## 2023-10-17 PROCEDURE — 99214 OFFICE O/P EST MOD 30 MIN: CPT

## 2023-10-17 NOTE — PROGRESS NOTES
Subjective:       Patient ID: Antonio Galvan II is a 58 y.o. male.    Chief Complaint: Pressure Ulcer (Pressure ulcers , L hip / R hip / sacrum , R and L ankle/heel ulcerations.   Positive culture to sacrum.   Reports taking Doxy and Cipro as prescrlbed.   Here for re-evaluation/)    HPI  Review of Systems      Objective:      Temp:  [97.6 °F (36.4 °C)]   Pulse:  [136]   Resp:  [20]   BP: (56)/(34)   SpO2:  [96 %]   Physical Exam       Altered Skin Integrity 05/06/22 1140 Right Heel  Full thickness tissue loss. Subcutaneous fat may be visible but bone, tendon or muscle are not exposed (Active)   05/06/22 1140   Altered Skin Integrity Present on Admission - Did Patient arrive to the hospital with altered skin?: yes   Side: Right   Orientation:    Location: Heel   Wound Number:    Is this injury device related?: No   Primary Wound Type:    Description of Altered Skin Integrity: Full thickness tissue loss. Subcutaneous fat may be visible but bone, tendon or muscle are not exposed   Ankle-Brachial Index:    Pulses:    Removal Indication and Assessment:    Wound Outcome:    (Retired) Wound Length (cm):    (Retired) Wound Width (cm):    (Retired) Depth (cm):    Wound Description (Comments):    Removal Indications:    Wound Image   10/17/23 1452   Dressing Appearance Intact;Dry 10/17/23 1452   Drainage Amount None 10/17/23 1452   Appearance Black;Dry;Eschar 10/17/23 1452   Black (%), Wound Tissue Color 100 % 10/17/23 1452   Periwound Area Dry;Scar tissue 10/17/23 1452   Wound Edges Defined 10/17/23 1452   Wound Length (cm) 0.3 cm 10/17/23 1452   Wound Width (cm) 0.6 cm 10/17/23 1452   Wound Surface Area (cm^2) 0.18 cm^2 10/17/23 1452   Care Cleansed with:;Antimicrobial agent;Applied:;Povidone iodine 10/17/23 1452   Dressing Applied;Gauze;Absorptive Pad;Rolled gauze 10/17/23 1452   Periwound Care Dry periwound area maintained 10/17/23 1452            Altered Skin Integrity 01/12/23 1530 Sacral spine Full thickness  tissue loss with exposed bone, tendon, or muscle. Often includes undermining and tunneling. May extend into muscle and/or supporting structures. (Active)   01/12/23 1530   Altered Skin Integrity Present on Admission - Did Patient arrive to the hospital with altered skin?: yes   Side:    Orientation:    Location: Sacral spine   Wound Number:    Is this injury device related?:    Primary Wound Type:    Description of Altered Skin Integrity: Full thickness tissue loss with exposed bone, tendon, or muscle. Often includes undermining and tunneling. May extend into muscle and/or supporting structures.   Ankle-Brachial Index:    Pulses:    Removal Indication and Assessment:    Wound Outcome:    (Retired) Wound Length (cm):    (Retired) Wound Width (cm):    (Retired) Depth (cm):    Wound Description (Comments):    Removal Indications:    Wound Image   10/17/23 1452   Description of Altered Skin Integrity Full thickness tissue loss. Base is covered by slough and/or eschar in the wound bed 10/17/23 1452   Dressing Appearance Intact;Moist drainage;Saturated 10/17/23 1452   Drainage Amount Copious 10/17/23 1452   Drainage Characteristics/Odor Yellow;Serosanguineous;Bleeding controlled 10/17/23 1452   Appearance Red;Pink;Black;Gray;Tan;Maroon;Purple;Yellow;Not granulating;Fibrin;Eschar;Slough 10/17/23 1452   Tissue loss description Full thickness 10/17/23 1452   Periwound Area Scar tissue;Excoriated;Denuded 10/17/23 1452   Wound Edges Irregular 10/17/23 1452   Wound Length (cm) 8 cm 10/17/23 1452   Wound Width (cm) 11 cm 10/17/23 1452   Wound Depth (cm) 1.5 cm 10/17/23 1452   Wound Volume (cm^3) 132 cm^3 10/17/23 1452   Wound Surface Area (cm^2) 88 cm^2 10/17/23 1452   Care Cleansed with:;Antimicrobial agent 10/17/23 1452   Dressing Applied;Sodium chloride impregnated;Gauze;Absorptive Pad 10/17/23 1452   Periwound Care Skin barrier film applied 10/17/23 1452            Altered Skin Integrity 08/01/23 1534 Left posterior Ankle  Full thickness tissue loss. Base is covered by slough and/or eschar in the wound bed (Active)   08/01/23 1534   Altered Skin Integrity Present on Admission - Did Patient arrive to the hospital with altered skin?: yes   Side: Left   Orientation: posterior   Location: Ankle   Wound Number:    Is this injury device related?:    Primary Wound Type:    Description of Altered Skin Integrity: Full thickness tissue loss. Base is covered by slough and/or eschar in the wound bed   Ankle-Brachial Index:    Pulses: 2 + palpable, strong doppler signal per md   Removal Indication and Assessment:    Wound Outcome:    (Retired) Wound Length (cm):    (Retired) Wound Width (cm):    (Retired) Depth (cm):    Wound Description (Comments):    Removal Indications:    Wound Image   10/17/23 1452   Dressing Appearance Intact;Dry 10/17/23 1452   Drainage Amount None 10/17/23 1452   Appearance Black;Eschar 10/17/23 1452   Black (%), Wound Tissue Color 100 % 10/17/23 1452   Periwound Area Dry 10/17/23 1452   Wound Edges Defined 10/17/23 1452   Care Cleansed with:;Antimicrobial agent;Applied:;Povidone iodine 10/17/23 1452   Dressing Applied;Gauze;Absorptive Pad;Rolled gauze 10/17/23 1452            Altered Skin Integrity 08/01/23 1535 Left anterior Ankle Other (comment) Full thickness tissue loss. Base is covered by slough and/or eschar in the wound bed (Active)   08/01/23 1535   Altered Skin Integrity Present on Admission - Did Patient arrive to the hospital with altered skin?: yes   Side: Left   Orientation: anterior   Location: Ankle   Wound Number:    Is this injury device related?: No   Primary Wound Type: Other   Description of Altered Skin Integrity: Full thickness tissue loss. Base is covered by slough and/or eschar in the wound bed   Ankle-Brachial Index:    Pulses: 2+ palpable and strong doppler pulses per MD   Removal Indication and Assessment:    Wound Outcome:    (Retired) Wound Length (cm):    (Retired) Wound Width (cm):     (Retired) Depth (cm):    Wound Description (Comments):    Removal Indications:    Wound Image   10/17/23 1452   Dressing Appearance Intact;Dry 10/17/23 1452   Drainage Amount None 10/17/23 1452   Appearance Black;Eschar 10/17/23 1452   Tissue loss description Full thickness 10/17/23 1452   Black (%), Wound Tissue Color 100 % 10/17/23 1452   Periwound Area Dry;Scar tissue 10/17/23 1452   Wound Edges Defined 10/17/23 1452   Care Cleansed with:;Antimicrobial agent;Applied:;Povidone iodine 10/17/23 1452   Dressing Applied;Gauze;Absorptive Pad;Rolled gauze 10/17/23 1452   Periwound Care Dry periwound area maintained 10/17/23 1452            Incision/Site 08/16/23 2011 Right Hip posterior (Active)   08/16/23 2011   Present Prior to Hospital Arrival?:    Side: Right   Location: Hip   Orientation: posterior   Incision Type:    Closure Method:    Additional Comments:    Removal Indication and Assessment:    Wound Outcome:    Removal Indications:    Wound Image   10/17/23 1452   Dressing Appearance Intact;Saturated 10/17/23 1452   Drainage Amount Copious 10/17/23 1452   Drainage Characteristics/Odor Serosanguineous;Yellow;No odor;Bleeding controlled 10/17/23 1452   Appearance Red;Granulating;Tan;Yellow;Slough;Fibrin 10/17/23 1452   Red (%), Wound Tissue Color 85 % 10/17/23 1452   Yellow (%), Wound Tissue Color 15 % 10/17/23 1452   Periwound Area Scar tissue;Dry 10/17/23 1452   Wound Edges Defined 10/17/23 1452   Wound Length (cm) 10 cm 10/17/23 1452   Wound Width (cm) 13.5 cm 10/17/23 1452   Wound Depth (cm) 2.5 cm 10/17/23 1452   Wound Volume (cm^3) 337.5 cm^3 10/17/23 1452   Wound Surface Area (cm^2) 135 cm^2 10/17/23 1452   Care Cleansed with:;Antimicrobial agent 10/17/23 1452   Dressing Applied;Sodium chloride impregnated;Gauze;Absorptive Pad 10/17/23 1452   Periwound Care Skin barrier film applied 10/17/23 1452            Incision/Site 08/16/23 2011 Left Hip lateral (Active)   08/16/23 2011   Present Prior to Hospital  Arrival?:    Side: Left   Location: Hip   Orientation: lateral   Incision Type:    Closure Method:    Additional Comments:    Removal Indication and Assessment:    Wound Outcome:    Removal Indications:    Wound Image   10/17/23 1452   Dressing Appearance Intact;Moist drainage 10/17/23 1452   Drainage Amount Large 10/17/23 1452   Drainage Characteristics/Odor Serosanguineous;Yellow 10/17/23 1452   Appearance Red;Granulating;Muscle;Tan;Yellow 10/17/23 1452   Red (%), Wound Tissue Color 80 % 10/17/23 1452   Yellow (%), Wound Tissue Color 20 % 10/17/23 1452   Periwound Area Intact 10/17/23 1452   Wound Length (cm) 7.2 cm 10/17/23 1452   Wound Width (cm) 3 cm 10/17/23 1452   Wound Depth (cm) 4 cm 10/17/23 1452   Wound Volume (cm^3) 86.4 cm^3 10/17/23 1452   Wound Surface Area (cm^2) 21.6 cm^2 10/17/23 1452   Care Cleansed with:;Antimicrobial agent 10/17/23 1452   Dressing Applied;Sodium chloride impregnated 10/17/23 1452   Packing packed with 10/17/23 1452   Periwound Care Skin barrier film applied 10/17/23 1452         Assessment:         ICD-10-CM ICD-9-CM   1. Pressure injury of right ischium, stage 4  L89.314 707.05     707.24   2. 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, stage 4, unspecified laterality  L89.44 707.09     707.24   3. Pressure injury of trochanteric region of left hip, stage 4  L89.224 707.04     707.24   4. Unstageable pressure ulcer of sacral region  L89.150 707.03     707.25   5. Pressure injury of right heel, stage 3  L89.613 707.07     707.23   6. Pressure injury of left ankle, unstageable  L89.520 707.06     707.25   7. Chronic blood loss anemia  D50.0 280.0         Plan:   Tissue pathology and/or culture taken:  [] Yes [] No   Sharp debridement performed:   [] Yes [] No   Labs ordered this visit:   [] Yes [] No   Imaging ordered this visit:   [] Yes [] No           Orders Placed This Encounter   Procedures    Change dressing     Sacrum / R posterior thigh, L  hip:  Cleanse with wound cleanser, pat dry, apply skin prep to periwound, allow to dry, apply vashe moistened mesalt to open area, cover with gauze, abd pad, tape  Change daily and prn soilage     Right heel/left achilles/left anterior foot: cleanse with wound cleanser, paint areas with betadine, allow to dry, cover with gauze, abd, wrap lightly with kerlix, secure with tape, change every other day      Cipro and take as prescribed.  Antibiotics may be changed when final culture results returned     MD recommends, delaying evaluation for skin substitute due to high suspicion of infection to sacral region and deterioration of wound bed.        Continue to offload, turn q2hrs, moisture management    Other orders: Topical antibiotic compound has been ordered from [facility].   When it arrives, home health to transition to topical antibiotic compound to L hip / R hip/posterior thigh and sacral area then cover with gauze, abd, medfix tape.    Change daily and prn        Follow up in about 1 week (around 10/24/2023) for md visit .

## 2023-10-17 NOTE — PROGRESS NOTES
Subjective:       Patient ID: Antonio Galvan II is a 58 y.o. male.    Chief Complaint: Pressure Ulcer (Pressure ulcers , L hip / R hip / sacrum , R and L ankle/heel ulcerations.   Positive culture to sacrum.   Reports taking Doxy and Cipro as prescrlbed.   Here for re-evaluation/)    HPI  Review of Systems      Objective:      Temp:  [97.6 °F (36.4 °C)]   Pulse:  [136]   Resp:  [20]   BP: (56)/(34)   SpO2:  [96 %]   Physical Exam       Altered Skin Integrity 05/06/22 1140 Right Heel  Full thickness tissue loss. Subcutaneous fat may be visible but bone, tendon or muscle are not exposed (Active)   05/06/22 1140   Altered Skin Integrity Present on Admission - Did Patient arrive to the hospital with altered skin?: yes   Side: Right   Orientation:    Location: Heel   Wound Number:    Is this injury device related?: No   Primary Wound Type:    Description of Altered Skin Integrity: Full thickness tissue loss. Subcutaneous fat may be visible but bone, tendon or muscle are not exposed   Ankle-Brachial Index:    Pulses:    Removal Indication and Assessment:    Wound Outcome:    (Retired) Wound Length (cm):    (Retired) Wound Width (cm):    (Retired) Depth (cm):    Wound Description (Comments):    Removal Indications:    Wound Image   10/17/23 1452   Dressing Appearance Intact;Dry 10/17/23 1452   Drainage Amount None 10/17/23 1452   Appearance Black;Dry;Eschar 10/17/23 1452   Black (%), Wound Tissue Color 100 % 10/17/23 1452   Periwound Area Dry;Scar tissue 10/17/23 1452   Wound Edges Defined 10/17/23 1452   Wound Length (cm) 0.3 cm 10/17/23 1452   Wound Width (cm) 0.6 cm 10/17/23 1452   Wound Surface Area (cm^2) 0.18 cm^2 10/17/23 1452   Care Cleansed with:;Antimicrobial agent;Applied:;Povidone iodine 10/17/23 1452   Dressing Applied;Gauze;Absorptive Pad;Rolled gauze 10/17/23 1452   Periwound Care Dry periwound area maintained 10/17/23 1452            Altered Skin Integrity 01/12/23 1530 Sacral spine Full thickness  tissue loss with exposed bone, tendon, or muscle. Often includes undermining and tunneling. May extend into muscle and/or supporting structures. (Active)   01/12/23 1530   Altered Skin Integrity Present on Admission - Did Patient arrive to the hospital with altered skin?: yes   Side:    Orientation:    Location: Sacral spine   Wound Number:    Is this injury device related?:    Primary Wound Type:    Description of Altered Skin Integrity: Full thickness tissue loss with exposed bone, tendon, or muscle. Often includes undermining and tunneling. May extend into muscle and/or supporting structures.   Ankle-Brachial Index:    Pulses:    Removal Indication and Assessment:    Wound Outcome:    (Retired) Wound Length (cm):    (Retired) Wound Width (cm):    (Retired) Depth (cm):    Wound Description (Comments):    Removal Indications:    Wound Image   10/17/23 1452   Description of Altered Skin Integrity Full thickness tissue loss. Base is covered by slough and/or eschar in the wound bed 10/17/23 1452   Dressing Appearance Intact;Moist drainage;Saturated 10/17/23 1452   Drainage Amount Copious 10/17/23 1452   Drainage Characteristics/Odor Yellow;Serosanguineous;Bleeding controlled 10/17/23 1452   Appearance Red;Pink;Black;Gray;Tan;Maroon;Purple;Yellow;Not granulating;Fibrin;Eschar;Slough 10/17/23 1452   Tissue loss description Full thickness 10/17/23 1452   Periwound Area Scar tissue;Excoriated;Denuded 10/17/23 1452   Wound Edges Irregular 10/17/23 1452   Wound Length (cm) 8 cm 10/17/23 1452   Wound Width (cm) 11 cm 10/17/23 1452   Wound Depth (cm) 1.5 cm 10/17/23 1452   Wound Volume (cm^3) 132 cm^3 10/17/23 1452   Wound Surface Area (cm^2) 88 cm^2 10/17/23 1452   Care Cleansed with:;Antimicrobial agent 10/17/23 1452   Dressing Applied;Sodium chloride impregnated;Gauze;Absorptive Pad 10/17/23 1452   Periwound Care Skin barrier film applied 10/17/23 1452            Altered Skin Integrity 08/01/23 1534 Left posterior Ankle  Full thickness tissue loss. Base is covered by slough and/or eschar in the wound bed (Active)   08/01/23 1534   Altered Skin Integrity Present on Admission - Did Patient arrive to the hospital with altered skin?: yes   Side: Left   Orientation: posterior   Location: Ankle   Wound Number:    Is this injury device related?:    Primary Wound Type:    Description of Altered Skin Integrity: Full thickness tissue loss. Base is covered by slough and/or eschar in the wound bed   Ankle-Brachial Index:    Pulses: 2 + palpable, strong doppler signal per md   Removal Indication and Assessment:    Wound Outcome:    (Retired) Wound Length (cm):    (Retired) Wound Width (cm):    (Retired) Depth (cm):    Wound Description (Comments):    Removal Indications:    Wound Image   10/17/23 1452   Dressing Appearance Intact;Dry 10/17/23 1452   Drainage Amount None 10/17/23 1452   Appearance Black;Eschar 10/17/23 1452   Black (%), Wound Tissue Color 100 % 10/17/23 1452   Periwound Area Dry 10/17/23 1452   Wound Edges Defined 10/17/23 1452   Care Cleansed with:;Antimicrobial agent;Applied:;Povidone iodine 10/17/23 1452   Dressing Applied;Gauze;Absorptive Pad;Rolled gauze 10/17/23 1452            Altered Skin Integrity 08/01/23 1535 Left anterior Ankle Other (comment) Full thickness tissue loss. Base is covered by slough and/or eschar in the wound bed (Active)   08/01/23 1535   Altered Skin Integrity Present on Admission - Did Patient arrive to the hospital with altered skin?: yes   Side: Left   Orientation: anterior   Location: Ankle   Wound Number:    Is this injury device related?: No   Primary Wound Type: Other   Description of Altered Skin Integrity: Full thickness tissue loss. Base is covered by slough and/or eschar in the wound bed   Ankle-Brachial Index:    Pulses: 2+ palpable and strong doppler pulses per MD   Removal Indication and Assessment:    Wound Outcome:    (Retired) Wound Length (cm):    (Retired) Wound Width (cm):     (Retired) Depth (cm):    Wound Description (Comments):    Removal Indications:    Wound Image   10/17/23 1452   Dressing Appearance Intact;Dry 10/17/23 1452   Drainage Amount None 10/17/23 1452   Appearance Black;Eschar 10/17/23 1452   Tissue loss description Full thickness 10/17/23 1452   Black (%), Wound Tissue Color 100 % 10/17/23 1452   Periwound Area Dry;Scar tissue 10/17/23 1452   Wound Edges Defined 10/17/23 1452   Care Cleansed with:;Antimicrobial agent;Applied:;Povidone iodine 10/17/23 1452   Dressing Applied;Gauze;Absorptive Pad;Rolled gauze 10/17/23 1452   Periwound Care Dry periwound area maintained 10/17/23 1452            Incision/Site 08/16/23 2011 Right Hip posterior (Active)   08/16/23 2011   Present Prior to Hospital Arrival?:    Side: Right   Location: Hip   Orientation: posterior   Incision Type:    Closure Method:    Additional Comments:    Removal Indication and Assessment:    Wound Outcome:    Removal Indications:    Wound Image   10/17/23 1452   Dressing Appearance Intact;Saturated 10/17/23 1452   Drainage Amount Copious 10/17/23 1452   Drainage Characteristics/Odor Serosanguineous;Yellow;No odor;Bleeding controlled 10/17/23 1452   Appearance Red;Granulating;Tan;Yellow;Slough;Fibrin 10/17/23 1452   Red (%), Wound Tissue Color 85 % 10/17/23 1452   Yellow (%), Wound Tissue Color 15 % 10/17/23 1452   Periwound Area Scar tissue;Dry 10/17/23 1452   Wound Edges Defined 10/17/23 1452   Wound Length (cm) 10 cm 10/17/23 1452   Wound Width (cm) 13.5 cm 10/17/23 1452   Wound Depth (cm) 2.5 cm 10/17/23 1452   Wound Volume (cm^3) 337.5 cm^3 10/17/23 1452   Wound Surface Area (cm^2) 135 cm^2 10/17/23 1452   Care Cleansed with:;Antimicrobial agent 10/17/23 1452   Dressing Applied;Sodium chloride impregnated;Gauze;Absorptive Pad 10/17/23 1452   Periwound Care Skin barrier film applied 10/17/23 1452            Incision/Site 08/16/23 2011 Left Hip lateral (Active)   08/16/23 2011   Present Prior to Hospital  Arrival?:    Side: Left   Location: Hip   Orientation: lateral   Incision Type:    Closure Method:    Additional Comments:    Removal Indication and Assessment:    Wound Outcome:    Removal Indications:    Wound Image   10/17/23 1452   Dressing Appearance Intact;Moist drainage 10/17/23 1452   Drainage Amount Large 10/17/23 1452   Drainage Characteristics/Odor Serosanguineous;Yellow 10/17/23 1452   Appearance Red;Granulating;Muscle;Tan;Yellow 10/17/23 1452   Red (%), Wound Tissue Color 80 % 10/17/23 1452   Yellow (%), Wound Tissue Color 20 % 10/17/23 1452   Periwound Area Intact 10/17/23 1452   Wound Length (cm) 7.2 cm 10/17/23 1452   Wound Width (cm) 3 cm 10/17/23 1452   Wound Depth (cm) 4 cm 10/17/23 1452   Wound Volume (cm^3) 86.4 cm^3 10/17/23 1452   Wound Surface Area (cm^2) 21.6 cm^2 10/17/23 1452   Care Cleansed with:;Antimicrobial agent 10/17/23 1452   Dressing Applied;Sodium chloride impregnated 10/17/23 1452   Packing packed with 10/17/23 1452   Periwound Care Skin barrier film applied 10/17/23 1452         Assessment:     Sacrum wound with yellow drainage and patient wound is less necrotic today.  Right hip wound with improved bleeding and less pale area.  Left hip essentially the same.  These areas were cleaned and Vashe moistened Mesalt was applied to the open areas and external dressing.  Areas of feet were still the same and were paint it with Betadine.  Patient is still on Ciprofloxacin.  And we are awaiting topical antibiotic which will be started by home health once it is available.  Patient will return in 1 week for MD visit.    ICD-10-CM ICD-9-CM   1. Pressure injury of right ischium, stage 4  L89.314 707.05     707.24   2. 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, stage 4, unspecified laterality  L89.44 707.09     707.24   3. Pressure injury of trochanteric region of left hip, stage 4  L89.224 707.04     707.24   4. Unstageable pressure ulcer of sacral region  L89.150  707.03     707.25   5. Pressure injury of right heel, stage 3  L89.613 707.07     707.23   6. Pressure injury of left ankle, unstageable  L89.520 707.06     707.25   7. Chronic blood loss anemia  D50.0 280.0         Plan:   Tissue pathology and/or culture taken:  [] Yes [x] No   Sharp debridement performed:   [] Yes [x] No   Labs ordered this visit:   [] Yes [x] No   Imaging ordered this visit:   [] Yes [x] No           Orders Placed This Encounter   Procedures    Change dressing     Sacrum / R posterior thigh, L hip:  Cleanse with wound cleanser, pat dry, apply skin prep to periwound, allow to dry, apply vashe moistened mesalt to open area, cover with gauze, abd pad, tape  Change daily and prn soilage     Right heel/left achilles/left anterior foot: cleanse with wound cleanser, paint areas with betadine, allow to dry, cover with gauze, abd, wrap lightly with kerlix, secure with tape, change every other day      Cipro and take as prescribed.  Antibiotics may be changed when final culture results returned     MD recommends, delaying evaluation for skin substitute due to high suspicion of infection to sacral region and deterioration of wound bed.        Continue to offload, turn q2hrs, moisture management    Other orders: Topical antibiotic compound has been ordered from [facility].   When it arrives, home health to transition to topical antibiotic compound to L hip / R hip/posterior thigh and sacral area then cover with gauze, abd, medfix tape.    Change daily and prn        Follow up in about 1 week (around 10/24/2023) for md visit .

## 2023-10-17 NOTE — PATIENT INSTRUCTIONS
Sacrum / R posterior thigh, L hip:   Cleanse with wound cleanser, pat dry, apply skin prep to periwound, allow to dry, apply vashe moistened mesalt to open area, cover with gauze, abd pad, tape   Change daily and prn soilage       Right heel/left achilles/left anterior foot: cleanse with wound cleanser, paint areas with betadine, allow to dry, cover with gauze, abd, wrap lightly with kerlix, secure with tape, change every other day        Cipro and take as prescribed.  Antibiotics may be changed when final culture results returned       MD recommends, delaying evaluation for skin substitute due to high suspicion of infection to sacral region and deterioration of wound bed.         Continue to offload, turn q2hrs, moisture management     Other orders: Topical antibiotic compound has been ordered from [facility].   When it arrives, home health to transition to topical antibiotic compound to L hip / R hip/posterior thigh and sacral area then cover with gauze, abd, medfix tape.    Change daily and prn

## 2023-10-24 ENCOUNTER — HOSPITAL ENCOUNTER (OUTPATIENT)
Dept: WOUND CARE | Facility: HOSPITAL | Age: 58
Discharge: HOME | End: 2023-10-24
Attending: STUDENT IN AN ORGANIZED HEALTH CARE EDUCATION/TRAINING PROGRAM
Payer: MEDICARE

## 2023-10-24 VITALS
TEMPERATURE: 98 F | HEART RATE: 134 BPM | DIASTOLIC BLOOD PRESSURE: 65 MMHG | SYSTOLIC BLOOD PRESSURE: 89 MMHG | OXYGEN SATURATION: 99 % | RESPIRATION RATE: 18 BRPM

## 2023-10-24 DIAGNOSIS — L89.152 SACRAL DECUBITUS ULCER, STAGE II: Primary | Chronic | ICD-10-CM

## 2023-10-24 DIAGNOSIS — S71.002S OPEN WOUND OF LEFT HIP, SEQUELA: ICD-10-CM

## 2023-10-24 DIAGNOSIS — L89.520 PRESSURE INJURY OF LEFT ANKLE, UNSTAGEABLE: ICD-10-CM

## 2023-10-24 DIAGNOSIS — L89.613 PRESSURE INJURY OF RIGHT HEEL, STAGE 3: ICD-10-CM

## 2023-10-24 DIAGNOSIS — L89.314 PRESSURE INJURY OF RIGHT ISCHIUM, STAGE 4: Chronic | ICD-10-CM

## 2023-10-24 PROCEDURE — 27000999 HC MEDICAL RECORD PHOTO DOCUMENTATION

## 2023-10-24 PROCEDURE — 99213 PR OFFICE/OUTPT VISIT, EST, LEVL III, 20-29 MIN: ICD-10-PCS | Mod: ,,, | Performed by: PEDIATRICS

## 2023-10-24 PROCEDURE — 99213 OFFICE O/P EST LOW 20 MIN: CPT | Mod: ,,, | Performed by: PEDIATRICS

## 2023-10-24 PROCEDURE — 99214 OFFICE O/P EST MOD 30 MIN: CPT

## 2023-10-24 NOTE — PROGRESS NOTES
Subjective:       Patient ID: Antonio Galvan II is a 58 y.o. male.    Chief Complaint: Pressure Ulcer (R hip, L hip, sacrum , Lfoot/ankle/R heel ulcerations.   Follow up with md for re-evaluation and treatment.  Reports completing Cipro as prescribed, using topical antibiotic compound to hip/buttock wounds.)    HPI  Review of Systems      Objective:      Temp:  [98 °F (36.7 °C)]   Pulse:  [134]   Resp:  [18]   BP: (89)/(65)   SpO2:  [99 %]   Physical Exam       Altered Skin Integrity 05/06/22 1140 Right Heel  Full thickness tissue loss. Subcutaneous fat may be visible but bone, tendon or muscle are not exposed (Active)   05/06/22 1140   Altered Skin Integrity Present on Admission - Did Patient arrive to the hospital with altered skin?: yes   Side: Right   Orientation:    Location: Heel   Wound Number:    Is this injury device related?: No   Primary Wound Type:    Description of Altered Skin Integrity: Full thickness tissue loss. Subcutaneous fat may be visible but bone, tendon or muscle are not exposed   Ankle-Brachial Index:    Pulses:    Removal Indication and Assessment:    Wound Outcome:    (Retired) Wound Length (cm):    (Retired) Wound Width (cm):    (Retired) Depth (cm):    Wound Description (Comments):    Removal Indications:    Wound Image   10/24/23 1515   Dressing Appearance Intact;Dry 10/24/23 1515   Drainage Amount None 10/24/23 1515   Appearance Black;Eschar;Dry 10/24/23 1515   Black (%), Wound Tissue Color 100 % 10/24/23 1515   Periwound Area Dry;Scar tissue 10/24/23 1515   Wound Edges Defined 10/24/23 1515   Wound Length (cm) 1 cm 10/24/23 1515   Wound Width (cm) 1 cm 10/24/23 1515   Wound Surface Area (cm^2) 1 cm^2 10/24/23 1515   Care Cleansed with:;Antimicrobial agent;Applied:;Povidone iodine 10/24/23 1515   Dressing Applied;Gauze;Absorptive Pad;Rolled gauze 10/24/23 1515   Periwound Care Dry periwound area maintained 10/24/23 1515            Altered Skin Integrity 01/12/23 1530 Sacral spine  Full thickness tissue loss with exposed bone, tendon, or muscle. Often includes undermining and tunneling. May extend into muscle and/or supporting structures. (Active)   01/12/23 1530   Altered Skin Integrity Present on Admission - Did Patient arrive to the hospital with altered skin?: yes   Side:    Orientation:    Location: Sacral spine   Wound Number:    Is this injury device related?:    Primary Wound Type:    Description of Altered Skin Integrity: Full thickness tissue loss with exposed bone, tendon, or muscle. Often includes undermining and tunneling. May extend into muscle and/or supporting structures.   Ankle-Brachial Index:    Pulses:    Removal Indication and Assessment:    Wound Outcome:    (Retired) Wound Length (cm):    (Retired) Wound Width (cm):    (Retired) Depth (cm):    Wound Description (Comments):    Removal Indications:    Wound Image   10/24/23 1515   Description of Altered Skin Integrity Full thickness tissue loss. Base is covered by slough and/or eschar in the wound bed 10/24/23 1515   Dressing Appearance Intact;Moist drainage 10/24/23 1515   Drainage Amount Large 10/24/23 1515   Drainage Characteristics/Odor Serosanguineous;No odor;Bleeding controlled 10/24/23 1515   Appearance Red;Granulating;Maroon;Purple;Not granulating;Gray;Tan;Slough 10/24/23 1515   Tissue loss description Full thickness 10/24/23 1515   Red (%), Wound Tissue Color 50 % 10/24/23 1515   Periwound Area Scar tissue 10/24/23 1515   Wound Edges Irregular 10/24/23 1515   Wound Length (cm) 7 cm 10/24/23 1515   Wound Width (cm) 6 cm 10/24/23 1515   Wound Depth (cm) 1.3 cm 10/24/23 1515   Wound Volume (cm^3) 54.6 cm^3 10/24/23 1515   Wound Surface Area (cm^2) 42 cm^2 10/24/23 1515   Care Cleansed with:;Antimicrobial agent 10/24/23 1515   Dressing Applied;Silver;Gauze;Absorptive Pad 10/24/23 1515   Periwound Care Skin barrier film applied 10/24/23 1515            Altered Skin Integrity 08/01/23 1534 Left posterior Ankle Full  thickness tissue loss. Base is covered by slough and/or eschar in the wound bed (Active)   08/01/23 1534   Altered Skin Integrity Present on Admission - Did Patient arrive to the hospital with altered skin?: yes   Side: Left   Orientation: posterior   Location: Ankle   Wound Number:    Is this injury device related?:    Primary Wound Type:    Description of Altered Skin Integrity: Full thickness tissue loss. Base is covered by slough and/or eschar in the wound bed   Ankle-Brachial Index:    Pulses: 2 + palpable, strong doppler signal per md   Removal Indication and Assessment:    Wound Outcome:    (Retired) Wound Length (cm):    (Retired) Wound Width (cm):    (Retired) Depth (cm):    Wound Description (Comments):    Removal Indications:    Wound Image   10/24/23 1515   Dressing Appearance Intact;Dry 10/24/23 1515   Drainage Amount None 10/24/23 1515   Appearance Black;Dry;Eschar 10/24/23 1515   Black (%), Wound Tissue Color 100 % 10/24/23 1515   Periwound Area Dry;Scar tissue 10/24/23 1515   Wound Edges Defined 10/24/23 1515   Wound Length (cm) 2.8 cm 10/24/23 1515   Wound Width (cm) 2.5 cm 10/24/23 1515   Wound Surface Area (cm^2) 7 cm^2 10/24/23 1515   Care Cleansed with:;Applied:;Povidone iodine 10/24/23 1515   Dressing Applied;Gauze;Absorptive Pad;Rolled gauze 10/24/23 1515            Altered Skin Integrity 08/01/23 1535 Left anterior Ankle Other (comment) Full thickness tissue loss. Base is covered by slough and/or eschar in the wound bed (Active)   08/01/23 1535   Altered Skin Integrity Present on Admission - Did Patient arrive to the hospital with altered skin?: yes   Side: Left   Orientation: anterior   Location: Ankle   Wound Number:    Is this injury device related?: No   Primary Wound Type: Other   Description of Altered Skin Integrity: Full thickness tissue loss. Base is covered by slough and/or eschar in the wound bed   Ankle-Brachial Index:    Pulses: 2+ palpable and strong doppler pulses per MD    Removal Indication and Assessment:    Wound Outcome:    (Retired) Wound Length (cm):    (Retired) Wound Width (cm):    (Retired) Depth (cm):    Wound Description (Comments):    Removal Indications:    Wound Image   10/24/23 1515   Dressing Appearance Intact;Dry 10/24/23 1515   Drainage Amount None 10/24/23 1515   Appearance Black;Eschar;Dry 10/24/23 1515   Tissue loss description Full thickness 10/24/23 1515   Black (%), Wound Tissue Color 100 % 10/24/23 1515   Periwound Area Dry;Scar tissue 10/24/23 1515   Wound Edges Defined 10/24/23 1515   Wound Length (cm) 2.5 cm 10/24/23 1515   Wound Width (cm) 1.2 cm 10/24/23 1515   Wound Surface Area (cm^2) 3 cm^2 10/24/23 1515   Care Cleansed with:;Soap and water;Applied:;Povidone iodine 10/24/23 1515   Dressing Applied;Gauze;Absorptive Pad;Rolled gauze 10/24/23 1515            Incision/Site 08/16/23 2011 Right Hip posterior (Active)   08/16/23 2011   Present Prior to Hospital Arrival?:    Side: Right   Location: Hip   Orientation: posterior   Incision Type:    Closure Method:    Additional Comments:    Removal Indication and Assessment:    Wound Outcome:    Removal Indications:    Wound Image   10/24/23 1515   Dressing Appearance Intact;Moist drainage 10/24/23 1515   Drainage Amount Large 10/24/23 1515   Drainage Characteristics/Odor Serosanguineous;No odor;Bleeding controlled 10/24/23 1515   Appearance Red;Granulating;Tan;Slough;Fibrin 10/24/23 1515   Red (%), Wound Tissue Color 90 % 10/24/23 1515   Yellow (%), Wound Tissue Color 10 % 10/24/23 1515   Periwound Area Scar tissue 10/24/23 1515   Wound Edges Defined;Rolled/closed 10/24/23 1515   Wound Length (cm) 9.5 cm 10/24/23 1515   Wound Width (cm) 14 cm 10/24/23 1515   Wound Depth (cm) 2.5 cm 10/24/23 1515   Wound Volume (cm^3) 332.5 cm^3 10/24/23 1515   Wound Surface Area (cm^2) 133 cm^2 10/24/23 1515   Undermining (depth (cm)/location) 12 to 2 o'clock / 2c, at 12 o'clock 10/24/23 1515   Care Cleansed  with:;Antimicrobial agent 10/24/23 1515   Dressing Applied;Sodium chloride impregnated;Gauze;Absorptive Pad 10/24/23 1515   Periwound Care Dry periwound area maintained 10/24/23 1515            Incision/Site 08/16/23 2011 Left Hip lateral (Active)   08/16/23 2011   Present Prior to Hospital Arrival?:    Side: Left   Location: Hip   Orientation: lateral   Incision Type:    Closure Method:    Additional Comments:    Removal Indication and Assessment:    Wound Outcome:    Removal Indications:    Wound Image   10/24/23 1515   Dressing Appearance Intact;Moist drainage 10/24/23 1515   Drainage Amount Large 10/24/23 1515   Drainage Characteristics/Odor Serosanguineous 10/24/23 1515   Appearance Red;Granulating;Tan;White;Adipose;Muscle 10/24/23 1515   Red (%), Wound Tissue Color 90 % 10/24/23 1515   Yellow (%), Wound Tissue Color 10 % 10/24/23 1515   Periwound Area Intact 10/24/23 1515   Wound Length (cm) 7 cm 10/24/23 1515   Wound Width (cm) 3.8 cm 10/24/23 1515   Wound Depth (cm) 2.5 cm 10/24/23 1515   Wound Volume (cm^3) 66.5 cm^3 10/24/23 1515   Wound Surface Area (cm^2) 26.6 cm^2 10/24/23 1515   Undermining (depth (cm)/location) 2cm/ 1 to 3 o'clock 10/24/23 1515   Care Cleansed with:;Antimicrobial agent 10/24/23 1515   Dressing Applied;Sodium chloride impregnated;Gauze;Absorptive Pad 10/24/23 1515   Packing packed with 10/24/23 1515   Periwound Care Skin barrier film applied 10/24/23 1515         Assessment:     Today foot wounds continue with eschar.  These are dry and noninfected.  These were paint it with Betadine.  Sacral wound shows some improvement with some necrotic tissue.  Right hip wound is granulated with really good tissue.  The left hip wound again shows good granulation tissue.  Wounds are improving with topical antibiotic ointment.  Today area areas were cleaned and Mesalt applied.  Patient continue home topical antibiotic to left and right hip wounds and sacral area.  Patient will continue to offload as  much as possible.  Patient will return in 1 week for MD visit    ICD-10-CM ICD-9-CM   1. Sacral decubitus ulcer, stage II  L89.152 707.03     707.22   2. Pressure injury of right ischium, stage 4  L89.314 707.05     707.24   3. Open wound of left hip, sequela  S71.002S 906.1   4. Pressure injury of left ankle, unstageable  L89.520 707.06     707.25   5. Pressure injury of right heel, stage 3  L89.613 707.07     707.23         Plan:   Tissue pathology and/or culture taken:  [] Yes [x] No   Sharp debridement performed:   [] Yes [x] No   Labs ordered this visit:   [] Yes [x] No   Imaging ordered this visit:   [] Yes [x] No           Orders Placed This Encounter   Procedures    Change dressing     Sacrum / R posterior thigh, L hip:   Cleanse with Vashe - pat dry, apply skin prep to periwound, allow to dry, apply topical antibiotic compound to open areas,  cover with gauze, abd pad, tape   Change daily and prn soilage       Right heel/left achilles/left anterior foot: cleanse with wound cleanser, paint areas with betadine, allow to dry, cover with gauze, abd, wrap lightly with kerlix, secure with tape, change every other day         MD recommends, delaying evaluation for skin substitute due to high suspicion of infection to sacral region and deterioration of wound bed.         Continue to offload, turn q2hrs, moisture management        Follow up in about 1 week (around 10/31/2023) for md visit .

## 2023-10-24 NOTE — PATIENT INSTRUCTIONS
Sacrum / R posterior thigh, L hip:   Cleanse with Vashe - pat dry, apply skin prep to periwound, allow to dry, apply topical antibiotic compound to open areas,  cover with gauze, abd pad, tape   Change daily and prn soilage       Right heel/left achilles/left anterior foot: cleanse with wound cleanser, paint areas with betadine, allow to dry, cover with gauze, abd, wrap lightly with kerlix, secure with tape, change every other day         MD recommends, delaying evaluation for skin substitute due to high suspicion of infection to sacral region and deterioration of wound bed.         Continue to offload, turn q2hrs, moisture management

## 2023-10-31 ENCOUNTER — HOSPITAL ENCOUNTER (OUTPATIENT)
Dept: WOUND CARE | Facility: HOSPITAL | Age: 58
Discharge: HOME | End: 2023-10-31
Attending: STUDENT IN AN ORGANIZED HEALTH CARE EDUCATION/TRAINING PROGRAM
Payer: MEDICARE

## 2023-10-31 VITALS
OXYGEN SATURATION: 97 % | DIASTOLIC BLOOD PRESSURE: 84 MMHG | TEMPERATURE: 97 F | SYSTOLIC BLOOD PRESSURE: 143 MMHG | HEART RATE: 138 BPM | RESPIRATION RATE: 18 BRPM

## 2023-10-31 DIAGNOSIS — L89.314 PRESSURE INJURY OF RIGHT ISCHIUM, STAGE 4: ICD-10-CM

## 2023-10-31 DIAGNOSIS — L89.150 UNSTAGEABLE PRESSURE ULCER OF SACRAL REGION: ICD-10-CM

## 2023-10-31 DIAGNOSIS — L89.152 SACRAL DECUBITUS ULCER, STAGE II: Primary | ICD-10-CM

## 2023-10-31 DIAGNOSIS — L89.520 PRESSURE INJURY OF LEFT ANKLE, UNSTAGEABLE: ICD-10-CM

## 2023-10-31 DIAGNOSIS — L89.613 PRESSURE INJURY OF RIGHT HEEL, STAGE 3: ICD-10-CM

## 2023-10-31 DIAGNOSIS — S71.002S OPEN WOUND OF LEFT HIP, SEQUELA: ICD-10-CM

## 2023-10-31 PROCEDURE — 27000999 HC MEDICAL RECORD PHOTO DOCUMENTATION

## 2023-10-31 PROCEDURE — 99213 OFFICE O/P EST LOW 20 MIN: CPT | Mod: ,,, | Performed by: PEDIATRICS

## 2023-10-31 PROCEDURE — 99214 OFFICE O/P EST MOD 30 MIN: CPT

## 2023-10-31 PROCEDURE — 99213 PR OFFICE/OUTPT VISIT, EST, LEVL III, 20-29 MIN: ICD-10-PCS | Mod: ,,, | Performed by: PEDIATRICS

## 2023-10-31 NOTE — PROGRESS NOTES
Subjective:       Patient ID: Antonio Galvan II is a 58 y.o. male.    Chief Complaint: Pressure Ulcer (R hip, L hip, sacrum , Lfoot/ankle/R heel ulcerations.   Follow up with md for re-evaluation and treatment.  Reports completing Cipro as prescribed, using topical antibiotic compound to hip/buttock wounds.))    HPI  Review of Systems      Objective:      Temp:  [97.4 °F (36.3 °C)]   Pulse:  [138]   Resp:  [18]   BP: (143)/(84)   SpO2:  [97 %]   Physical Exam       Altered Skin Integrity 05/06/22 1140 Right Heel  Full thickness tissue loss. Subcutaneous fat may be visible but bone, tendon or muscle are not exposed (Active)   05/06/22 1140   Altered Skin Integrity Present on Admission - Did Patient arrive to the hospital with altered skin?: yes   Side: Right   Orientation:    Location: Heel   Wound Number:    Is this injury device related?: No   Primary Wound Type:    Description of Altered Skin Integrity: Full thickness tissue loss. Subcutaneous fat may be visible but bone, tendon or muscle are not exposed   Ankle-Brachial Index:    Pulses:    Removal Indication and Assessment:    Wound Outcome:    (Retired) Wound Length (cm):    (Retired) Wound Width (cm):    (Retired) Depth (cm):    Wound Description (Comments):    Removal Indications:    Wound Image   10/31/23 1459   Description of Altered Skin Integrity Full thickness tissue loss. Subcutaneous fat may be visible but bone, tendon or muscle are not exposed 10/31/23 1459   Drainage Amount Scant 10/31/23 1459   Drainage Characteristics/Odor Sanguineous 10/31/23 1459   Appearance Red;Granulating 10/31/23 1459   Tissue loss description Full thickness 10/31/23 1459   Red (%), Wound Tissue Color 100 % 10/31/23 1459   Periwound Area Dry;Scar tissue 10/31/23 1459   Wound Edges Defined 10/31/23 1459   Wound Length (cm) 1 cm 10/31/23 1459   Wound Width (cm) 1 cm 10/31/23 1459   Wound Depth (cm) 0.1 cm 10/31/23 1459   Wound Volume (cm^3) 0.1 cm^3 10/31/23 1459   Wound  Surface Area (cm^2) 1 cm^2 10/31/23 1459   Care Cleansed with:;Antimicrobial agent 10/31/23 1459   Dressing Applied;Sodium chloride impregnated;Gauze;Absorptive Pad 10/31/23 1459            Altered Skin Integrity 01/12/23 1530 Sacral spine Full thickness tissue loss with exposed bone, tendon, or muscle. Often includes undermining and tunneling. May extend into muscle and/or supporting structures. (Active)   01/12/23 1530   Altered Skin Integrity Present on Admission - Did Patient arrive to the hospital with altered skin?: yes   Side:    Orientation:    Location: Sacral spine   Wound Number:    Is this injury device related?:    Primary Wound Type:    Description of Altered Skin Integrity: Full thickness tissue loss with exposed bone, tendon, or muscle. Often includes undermining and tunneling. May extend into muscle and/or supporting structures.   Ankle-Brachial Index:    Pulses:    Removal Indication and Assessment:    Wound Outcome:    (Retired) Wound Length (cm):    (Retired) Wound Width (cm):    (Retired) Depth (cm):    Wound Description (Comments):    Removal Indications:    Wound Image   10/31/23 1459   Description of Altered Skin Integrity Full thickness tissue loss. Base is covered by slough and/or eschar in the wound bed 10/31/23 1459   Dressing Appearance Intact;Moist drainage 10/31/23 1459   Drainage Amount Large 10/31/23 1459   Drainage Characteristics/Odor Serosanguineous 10/31/23 1459   Appearance Red;Granulating;White;Slough;Maroon 10/31/23 1459   Tissue loss description Full thickness 10/31/23 1459   Red (%), Wound Tissue Color 75 % 10/31/23 1459   Yellow (%), Wound Tissue Color 25 % 10/31/23 1459   Periwound Area Scar tissue 10/31/23 1459   Wound Edges Irregular 10/31/23 1459   Wound Length (cm) 6 cm 10/31/23 1459   Wound Width (cm) 6 cm 10/31/23 1459   Wound Depth (cm) 1.5 cm 10/31/23 1459   Wound Volume (cm^3) 54 cm^3 10/31/23 1459   Wound Surface Area (cm^2) 36 cm^2 10/31/23 1459   Care Cleansed  with:;Antimicrobial agent 10/31/23 1459   Dressing Applied;Sodium chloride impregnated;Gauze;Absorptive Pad 10/31/23 1459   Periwound Care Skin barrier film applied 10/31/23 1459            Altered Skin Integrity 08/01/23 1534 Left posterior Ankle Full thickness tissue loss. Base is covered by slough and/or eschar in the wound bed (Active)   08/01/23 1534   Altered Skin Integrity Present on Admission - Did Patient arrive to the hospital with altered skin?: yes   Side: Left   Orientation: posterior   Location: Ankle   Wound Number:    Is this injury device related?:    Primary Wound Type:    Description of Altered Skin Integrity: Full thickness tissue loss. Base is covered by slough and/or eschar in the wound bed   Ankle-Brachial Index:    Pulses: 2 + palpable, strong doppler signal per md   Removal Indication and Assessment:    Wound Outcome:    (Retired) Wound Length (cm):    (Retired) Wound Width (cm):    (Retired) Depth (cm):    Wound Description (Comments):    Removal Indications:    Wound Image   10/31/23 1459   Dressing Appearance Dry;Intact;Clean 10/31/23 1459   Drainage Amount None 10/31/23 1459   Appearance Black;Eschar 10/31/23 1459   Black (%), Wound Tissue Color 100 % 10/31/23 1459   Periwound Area Dry;Ecchymotic;Scar tissue 10/31/23 1459   Wound Edges Defined 10/31/23 1459   Wound Length (cm) 2.5 cm 10/31/23 1459   Wound Width (cm) 2.5 cm 10/31/23 1459   Wound Surface Area (cm^2) 6.25 cm^2 10/31/23 1459   Care Cleansed with:;Sterile normal saline;Applied:;Povidone iodine 10/31/23 1459   Dressing Applied;Gauze;Absorptive Pad;Rolled gauze 10/31/23 1459            Altered Skin Integrity 08/01/23 1535 Left anterior Ankle Other (comment) Full thickness tissue loss. Base is covered by slough and/or eschar in the wound bed (Active)   08/01/23 1535   Altered Skin Integrity Present on Admission - Did Patient arrive to the hospital with altered skin?: yes   Side: Left   Orientation: anterior   Location: Ankle    Wound Number:    Is this injury device related?: No   Primary Wound Type: Other   Description of Altered Skin Integrity: Full thickness tissue loss. Base is covered by slough and/or eschar in the wound bed   Ankle-Brachial Index:    Pulses: 2+ palpable and strong doppler pulses per MD   Removal Indication and Assessment:    Wound Outcome:    (Retired) Wound Length (cm):    (Retired) Wound Width (cm):    (Retired) Depth (cm):    Wound Description (Comments):    Removal Indications:    Wound Image   10/31/23 1459   Dressing Appearance Intact;Clean;Dry 10/31/23 1459   Drainage Amount None 10/31/23 1459   Appearance Black;Dry;Eschar 10/31/23 1459   Black (%), Wound Tissue Color 100 % 10/31/23 1459   Periwound Area Dry;Scar tissue 10/31/23 1459   Wound Edges Defined 10/31/23 1459   Wound Length (cm) 2.3 cm 10/31/23 1459   Wound Width (cm) 2.5 cm 10/31/23 1459   Wound Surface Area (cm^2) 5.75 cm^2 10/31/23 1459   Care Cleansed with:;Sterile normal saline;Applied:;Povidone iodine 10/31/23 1459   Dressing Applied;Gauze;Absorptive Pad;Rolled gauze 10/31/23 1459   Periwound Care Dry periwound area maintained 10/31/23 1459            Incision/Site 08/16/23 2011 Right Hip posterior (Active)   08/16/23 2011   Present Prior to Hospital Arrival?:    Side: Right   Location: Hip   Orientation: posterior   Incision Type:    Closure Method:    Additional Comments:    Removal Indication and Assessment:    Wound Outcome:    Removal Indications:    Wound Image   10/31/23 1459   Dressing Appearance Intact;Moist drainage 10/31/23 1459   Drainage Amount Large 10/31/23 1459   Drainage Characteristics/Odor Serosanguineous;No odor;Bleeding controlled 10/31/23 1459   Appearance Red;Granulating;Tan;Fibrin 10/31/23 1459   Red (%), Wound Tissue Color 95 % 10/31/23 1459   Yellow (%), Wound Tissue Color 5 % 10/31/23 1459   Wound Edges Defined;Rolled/closed 10/31/23 1459   Wound Length (cm) 10 cm 10/31/23 1459   Wound Width (cm) 13.7 cm 10/31/23  1459   Wound Depth (cm) 2.8 cm 10/31/23 1459   Wound Volume (cm^3) 383.6 cm^3 10/31/23 1459   Wound Surface Area (cm^2) 137 cm^2 10/31/23 1459   Undermining (depth (cm)/location) 12 to 3 o'clock / 2..6cm 10/31/23 1459   Care Cleansed with:;Antimicrobial agent 10/31/23 1459   Dressing Applied;Sodium chloride impregnated;Gauze;Absorptive Pad 10/31/23 1459   Periwound Care Dry periwound area maintained 10/31/23 1459            Incision/Site 08/16/23 2011 Left Hip lateral (Active)   08/16/23 2011   Present Prior to Hospital Arrival?:    Side: Left   Location: Hip   Orientation: lateral   Incision Type:    Closure Method:    Additional Comments:    Removal Indication and Assessment:    Wound Outcome:    Removal Indications:    Wound Image   10/31/23 1459   Dressing Appearance Intact;Moist drainage 10/31/23 1459   Drainage Amount Moderate 10/31/23 1459   Drainage Characteristics/Odor Sanguineous;No odor;Bleeding controlled 10/31/23 1459   Appearance Red;Granulating;Tan;White 10/31/23 1459   Red (%), Wound Tissue Color 95 % 10/31/23 1459   Yellow (%), Wound Tissue Color 5 % 10/31/23 1459   Periwound Area Intact 10/31/23 1459   Wound Length (cm) 8.5 cm 10/31/23 1459   Wound Width (cm) 3.5 cm 10/31/23 1459   Wound Depth (cm) 2.5 cm 10/31/23 1459   Wound Volume (cm^3) 74.375 cm^3 10/31/23 1459   Wound Surface Area (cm^2) 29.75 cm^2 10/31/23 1459   Undermining (depth (cm)/location) at 3 and at 9 o'clock / undermining 2cm 10/31/23 1459   Care Cleansed with:;Antimicrobial agent 10/31/23 1459   Dressing Applied;Sodium chloride impregnated;Gauze;Absorptive Pad 10/31/23 1459         Assessment:     Today there is a generalized improvement of all areas.  Wound of left foot and posterior ankle has improving eschar.  This will be continued to paint with Betadine.  Sacral decubitus ulcer has improved with some improvement of granular tissue.  Right hip wound is well granulated and red.  No evidence of drainage today.  Left hip also  much smaller and is more shallow.  All wounds were cleaned with Vashe and topical antibiotic compound was applied to open areas.  This will be changed daily.  Patient continue to offload patient will return in 1 week for MD visit    ICD-10-CM ICD-9-CM   1. Sacral decubitus ulcer, stage II  L89.152 707.03     707.22   2. Pressure injury of right ischium, stage 4  L89.314 707.05     707.24   3. Open wound of left hip, sequela  S71.002S 906.1   4. Pressure injury of left ankle, unstageable  L89.520 707.06     707.25   5. Pressure injury of right heel, stage 3  L89.613 707.07     707.23   6. Unstageable pressure ulcer of sacral region  L89.150 707.03     707.25         Plan:   Tissue pathology and/or culture taken:  [] Yes [x] No   Sharp debridement performed:   [] Yes [x] No   Labs ordered this visit:   [] Yes [x] No   Imaging ordered this visit:   [] Yes [x] No           Orders Placed This Encounter   Procedures    Change dressing     Sacrum / R posterior thigh, L hip:   Cleanse with Vashe - pat dry, apply skin prep to periwound, allow to dry, apply topical antibiotic compound to open areas,  cover with gauze, abd pad, tape   Change daily and prn soilage       Right heel/left achilles/left anterior foot: cleanse with wound cleanser, paint areas with betadine, allow to dry, cover with gauze, abd, wrap lightly with kerlix, secure with tape, change every other day         MD recommends, delaying evaluation for skin substitute due to high suspicion of infection to sacral region and deterioration of wound bed.         Continue to offload, turn q2hrs, moisture management        Follow up in about 1 week (around 11/7/2023) for md visit .

## 2023-11-07 ENCOUNTER — HOSPITAL ENCOUNTER (OUTPATIENT)
Dept: WOUND CARE | Facility: HOSPITAL | Age: 58
Discharge: HOME | End: 2023-11-07
Attending: STUDENT IN AN ORGANIZED HEALTH CARE EDUCATION/TRAINING PROGRAM
Payer: MEDICARE

## 2023-11-07 VITALS
OXYGEN SATURATION: 100 % | DIASTOLIC BLOOD PRESSURE: 69 MMHG | TEMPERATURE: 99 F | SYSTOLIC BLOOD PRESSURE: 100 MMHG | RESPIRATION RATE: 16 BRPM | HEART RATE: 135 BPM

## 2023-11-07 DIAGNOSIS — E11.65 UNCONTROLLED TYPE 2 DIABETES MELLITUS WITH HYPERGLYCEMIA: ICD-10-CM

## 2023-11-07 DIAGNOSIS — S71.002S OPEN WOUND OF LEFT HIP, SEQUELA: ICD-10-CM

## 2023-11-07 DIAGNOSIS — L89.520 PRESSURE INJURY OF LEFT ANKLE, UNSTAGEABLE: ICD-10-CM

## 2023-11-07 DIAGNOSIS — S71.002D OPEN WOUND OF LEFT HIP, SUBSEQUENT ENCOUNTER: Primary | ICD-10-CM

## 2023-11-07 DIAGNOSIS — L89.44 PRESSURE INJURY OF CONTIGUOUS REGION INVOLVING BACK AND BUTTOCK, STAGE 4, UNSPECIFIED LATERALITY: ICD-10-CM

## 2023-11-07 DIAGNOSIS — L89.613 PRESSURE INJURY OF RIGHT HEEL, STAGE 3: ICD-10-CM

## 2023-11-07 DIAGNOSIS — L89.150 UNSTAGEABLE PRESSURE ULCER OF SACRAL REGION: ICD-10-CM

## 2023-11-07 DIAGNOSIS — G82.50 QUADRIPLEGIA: Chronic | ICD-10-CM

## 2023-11-07 DIAGNOSIS — L89.314 PRESSURE INJURY OF RIGHT ISCHIUM, STAGE 4: Chronic | ICD-10-CM

## 2023-11-07 PROCEDURE — 99213 PR OFFICE/OUTPT VISIT, EST, LEVL III, 20-29 MIN: ICD-10-PCS | Mod: ,,, | Performed by: PEDIATRICS

## 2023-11-07 PROCEDURE — 99213 OFFICE O/P EST LOW 20 MIN: CPT | Mod: ,,, | Performed by: PEDIATRICS

## 2023-11-07 PROCEDURE — 99214 OFFICE O/P EST MOD 30 MIN: CPT

## 2023-11-07 PROCEDURE — 27000999 HC MEDICAL RECORD PHOTO DOCUMENTATION

## 2023-11-07 NOTE — PATIENT INSTRUCTIONS
R posterior thigh, L hip:   Cleanse with Vashe - pat dry, apply skin prep to periwound, allow to dry, apply topical antibiotic compound to open areas,  cover with gauze, abd pad, tape   Change daily and prn soilage     Sacrum - Cleanse with Vashe, pat dry, apply skin prep to periwound, allow to dry, apply santyl and topical antibiotic compound to open area of Sacrum, cover with gauze, abd, tape.   Change daily and prn soilage      Right heel/left achilles/left anterior foot: cleanse with wound cleanser, paint areas with betadine, allow to dry, cover with gauze, abd, wrap lightly with kerlix, secure with tape, change every other day         MD recommends, delaying evaluation for skin substitute due to high suspicion of infection to sacral region and deterioration of wound bed.         Continue to offload, turn q2hrs, moisture management

## 2023-11-07 NOTE — PROGRESS NOTES
Subjective:       Patient ID: Antonio Galvan II is a 58 y.o. male.    Chief Complaint: Pressure Ulcer (Multiple pressure ulcerations.   Follow up with md for re-evaluation and treatment with md. )    HPI  Review of Systems      Objective:      Temp:  [98.5 °F (36.9 °C)]   Pulse:  [135]   Resp:  [16]   BP: (100)/(69)   SpO2:  [100 %]   Physical Exam       Altered Skin Integrity 05/06/22 1140 Right Heel  Full thickness tissue loss. Subcutaneous fat may be visible but bone, tendon or muscle are not exposed (Active)   05/06/22 1140   Altered Skin Integrity Present on Admission - Did Patient arrive to the hospital with altered skin?: yes   Side: Right   Orientation:    Location: Heel   Wound Number:    Is this injury device related?: No   Primary Wound Type:    Description of Altered Skin Integrity: Full thickness tissue loss. Subcutaneous fat may be visible but bone, tendon or muscle are not exposed   Ankle-Brachial Index:    Pulses:    Removal Indication and Assessment:    Wound Outcome:    (Retired) Wound Length (cm):    (Retired) Wound Width (cm):    (Retired) Depth (cm):    Wound Description (Comments):    Removal Indications:    Wound Image   11/07/23 1517   Dressing Appearance Dry;Intact;Clean 11/07/23 1517   Drainage Amount None 11/07/23 1517   Appearance Red;Granulating 11/07/23 1517   Tissue loss description Full thickness 11/07/23 1517   Red (%), Wound Tissue Color 100 % 11/07/23 1517   Periwound Area Dry;Scar tissue 11/07/23 1517   Wound Edges Defined 11/07/23 1517   Wound Length (cm) 0.7 cm 11/07/23 1517   Wound Width (cm) 1 cm 11/07/23 1517   Wound Depth (cm) 0.1 cm 11/07/23 1517   Wound Volume (cm^3) 0.07 cm^3 11/07/23 1517   Wound Surface Area (cm^2) 0.7 cm^2 11/07/23 1517   Care Cleansed with:;Applied:;Povidone iodine 11/07/23 1517   Dressing Applied;Gauze;Absorptive Pad;Rolled gauze 11/07/23 1517   Periwound Care Dry periwound area maintained;Skin barrier film applied 11/07/23 1517             Altered Skin Integrity 01/12/23 1530 Sacral spine Full thickness tissue loss with exposed bone, tendon, or muscle. Often includes undermining and tunneling. May extend into muscle and/or supporting structures. (Active)   01/12/23 1530   Altered Skin Integrity Present on Admission - Did Patient arrive to the hospital with altered skin?: yes   Side:    Orientation:    Location: Sacral spine   Wound Number:    Is this injury device related?:    Primary Wound Type:    Description of Altered Skin Integrity: Full thickness tissue loss with exposed bone, tendon, or muscle. Often includes undermining and tunneling. May extend into muscle and/or supporting structures.   Ankle-Brachial Index:    Pulses:    Removal Indication and Assessment:    Wound Outcome:    (Retired) Wound Length (cm):    (Retired) Wound Width (cm):    (Retired) Depth (cm):    Wound Description (Comments):    Removal Indications:    Wound Image   11/07/23 1517   Description of Altered Skin Integrity Full thickness tissue loss. Base is covered by slough and/or eschar in the wound bed 11/07/23 1517   Dressing Appearance Intact;Moist drainage 11/07/23 1517   Drainage Amount Large 11/07/23 1517   Drainage Characteristics/Odor Serosanguineous 11/07/23 1517   Appearance Red;Granulating;Tan;Gray;Yellow;Slough;Fibrin 11/07/23 1517   Tissue loss description Full thickness 11/07/23 1517   Red (%), Wound Tissue Color 50 % 11/07/23 1517   Yellow (%), Wound Tissue Color 50 % 11/07/23 1517   Periwound Area Scar tissue;Denuded;Excoriated 11/07/23 1517   Wound Edges Irregular 11/07/23 1517   Wound Length (cm) 5.6 cm 11/07/23 1517   Wound Width (cm) 6.5 cm 11/07/23 1517   Wound Depth (cm) 1.2 cm 11/07/23 1517   Wound Volume (cm^3) 43.68 cm^3 11/07/23 1517   Wound Surface Area (cm^2) 36.4 cm^2 11/07/23 1517   Care Cleansed with:;Antimicrobial agent 11/07/23 1517   Dressing Applied;Gauze;Absorptive Pad;Other (comment) 11/07/23 1517   Periwound Care Skin barrier film  applied;Dry periwound area maintained 11/07/23 1517            Altered Skin Integrity 08/01/23 1534 Left posterior Ankle Full thickness tissue loss. Base is covered by slough and/or eschar in the wound bed (Active)   08/01/23 1534   Altered Skin Integrity Present on Admission - Did Patient arrive to the hospital with altered skin?: yes   Side: Left   Orientation: posterior   Location: Ankle   Wound Number:    Is this injury device related?:    Primary Wound Type:    Description of Altered Skin Integrity: Full thickness tissue loss. Base is covered by slough and/or eschar in the wound bed   Ankle-Brachial Index:    Pulses: 2 + palpable, strong doppler signal per md   Removal Indication and Assessment:    Wound Outcome:    (Retired) Wound Length (cm):    (Retired) Wound Width (cm):    (Retired) Depth (cm):    Wound Description (Comments):    Removal Indications:    Wound Image   11/07/23 1517   Dressing Appearance Intact;Dry;Clean 11/07/23 1517   Drainage Amount None 11/07/23 1517   Appearance Black;Eschar;Dry 11/07/23 1517   Black (%), Wound Tissue Color 100 % 11/07/23 1517   Periwound Area Dry;Maroon;Scar tissue 11/07/23 1517   Wound Edges Defined 11/07/23 1517   Wound Length (cm) 2 cm 11/07/23 1517   Wound Width (cm) 1.7 cm 11/07/23 1517   Wound Surface Area (cm^2) 3.4 cm^2 11/07/23 1517   Care Cleansed with:;Antimicrobial agent;Applied:;Povidone iodine 11/07/23 1517   Dressing Applied;Gauze;Absorptive Pad;Rolled gauze 11/07/23 1517   Periwound Care Dry periwound area maintained 11/07/23 1517            Altered Skin Integrity 08/01/23 1535 Left anterior Ankle Other (comment) Full thickness tissue loss. Base is covered by slough and/or eschar in the wound bed (Active)   08/01/23 1535   Altered Skin Integrity Present on Admission - Did Patient arrive to the hospital with altered skin?: yes   Side: Left   Orientation: anterior   Location: Ankle   Wound Number:    Is this injury device related?: No   Primary Wound  Type: Other   Description of Altered Skin Integrity: Full thickness tissue loss. Base is covered by slough and/or eschar in the wound bed   Ankle-Brachial Index:    Pulses: 2+ palpable and strong doppler pulses per MD   Removal Indication and Assessment:    Wound Outcome:    (Retired) Wound Length (cm):    (Retired) Wound Width (cm):    (Retired) Depth (cm):    Wound Description (Comments):    Removal Indications:    Wound Image   11/07/23 1517   Dressing Appearance Intact;Dry;Clean 11/07/23 1517   Drainage Amount None 11/07/23 1517   Appearance Black;Eschar;Dry 11/07/23 1517   Black (%), Wound Tissue Color 100 % 11/07/23 1517   Periwound Area Scar tissue;Maroon;Dry 11/07/23 1517   Wound Edges Defined 11/07/23 1517   Wound Length (cm) 2.1 cm 11/07/23 1517   Wound Width (cm) 2.5 cm 11/07/23 1517   Wound Surface Area (cm^2) 5.25 cm^2 11/07/23 1517   Care Cleansed with:;Sterile normal saline;Applied:;Povidone iodine 11/07/23 1517   Dressing Applied;Gauze;Absorptive Pad;Rolled gauze 11/07/23 1517   Periwound Care Dry periwound area maintained 11/07/23 1517            Incision/Site 08/16/23 2011 Right Hip posterior (Active)   08/16/23 2011   Present Prior to Hospital Arrival?:    Side: Right   Location: Hip   Orientation: posterior   Incision Type:    Closure Method:    Additional Comments:    Removal Indication and Assessment:    Wound Outcome:    Removal Indications:    Wound Image   11/07/23 1517   Dressing Appearance Intact;Moist drainage 11/07/23 1517   Drainage Amount Moderate 11/07/23 1517   Drainage Characteristics/Odor Serosanguineous;No odor;Bleeding controlled 11/07/23 1517   Appearance Red;Granulating;Ryan 11/07/23 1517   Red (%), Wound Tissue Color 95 % 11/07/23 1517   Yellow (%), Wound Tissue Color 5 % 11/07/23 1517   Wound Length (cm) 10.7 cm 11/07/23 1517   Wound Width (cm) 14 cm 11/07/23 1517   Wound Depth (cm) 2.8 cm 11/07/23 1517   Wound Volume (cm^3) 419.44 cm^3 11/07/23 1517   Wound Surface Area  (cm^2) 149.8 cm^2 11/07/23 1517   Undermining (depth (cm)/location) 12 to 3 o'clock / 1.1cm 11/07/23 1517   Care Cleansed with:;Antimicrobial agent 11/07/23 1517   Dressing Applied;Gauze;Absorptive Pad;Other (comment) 11/07/23 1517   Periwound Care Skin barrier film applied;Dry periwound area maintained 11/07/23 1517            Incision/Site 08/16/23 2011 Left Hip lateral (Active)   08/16/23 2011   Present Prior to Hospital Arrival?:    Side: Left   Location: Hip   Orientation: lateral   Incision Type:    Closure Method:    Additional Comments:    Removal Indication and Assessment:    Wound Outcome:    Removal Indications:    Wound Image   11/07/23 1517   Dressing Appearance Intact;Moist drainage 11/07/23 1517   Drainage Amount Moderate 11/07/23 1517   Drainage Characteristics/Odor Serosanguineous 11/07/23 1517   Appearance Red;Granulating 11/07/23 1517   Red (%), Wound Tissue Color 100 % 11/07/23 1517   Periwound Area Intact 11/07/23 1517   Wound Length (cm) 7.8 cm 11/07/23 1517   Wound Width (cm) 3.8 cm 11/07/23 1517   Wound Depth (cm) 2 cm 11/07/23 1517   Wound Volume (cm^3) 59.28 cm^3 11/07/23 1517   Wound Surface Area (cm^2) 29.64 cm^2 11/07/23 1517   Undermining (depth (cm)/location) at 3 and at 9 o'clock / undermining 2cm 11/07/23 1517   Care Cleansed with:;Antimicrobial agent 11/07/23 1517   Dressing Applied;Gauze;Absorptive Pad;Other (comment) 11/07/23 1517   Packing packed with;other (see comment) 11/07/23 1517   Periwound Care Skin barrier film applied 11/07/23 1517         Assessment:     Day right heel is almost completely epithelialized.  Dorsum of left foot and right Achilles area of eschar which were getting smaller.  They are also loosening.  These were Betadine.  Sacral ulcer is smaller.  There is still an area of slough.  This area was cleaned and antibiotic powder was applied and Santyl.  The 2 other wounds are granulating well and are improving on antibiotic powder.  These were covered gauze.   To check the patient's nutritional status, a CBC, CMP, sed rate, and pre albumen were drawn.  Dressing will be changed daily.  Patient will return in 1 week for MD visit    ICD-10-CM ICD-9-CM   1. Sacral decubitus ulcer, stage II  L89.152 707.03     707.22   2. Open wound of left hip, subsequent encounter  S71.002D V58.89     890.0   3. Pressure injury of right ischium, stage 4  L89.314 707.05     707.24   4. Quadriplegia  G82.50 344.00   5. Uncontrolled type 2 diabetes mellitus with hyperglycemia  E11.65 250.02   6. Open wound of left hip, sequela  S71.002S 906.1   7. Pressure injury of left ankle, unstageable  L89.520 707.06     707.25   8. Pressure injury of right heel, stage 3  L89.613 707.07     707.23   9. Unstageable pressure ulcer of sacral region  L89.150 707.03     707.25   10. 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, stage 4, unspecified laterality  L89.44 707.09     707.24         Plan:   Tissue pathology and/or culture taken:  [] Yes [x] No   Sharp debridement performed:   [] Yes [x] No   Labs ordered this visit:   [] Yes [x] No   Imaging ordered this visit:   [] Yes [x] No           Orders Placed This Encounter   Procedures    CBC Auto Differential     Standing Status:   Future     Standing Expiration Date:   1/5/2025    Comprehensive Metabolic Panel     Standing Status:   Future     Standing Expiration Date:   1/5/2025    Prealbumin     Standing Status:   Future     Standing Expiration Date:   1/5/2025    Sedimentation rate     Standing Status:   Future     Standing Expiration Date:   1/5/2025    Change dressing     R posterior thigh, L hip:   Cleanse with Vashe - pat dry, apply skin prep to periwound, allow to dry, apply topical antibiotic compound to open areas,  cover with gauze, abd pad, tape   Change daily and prn soilage     Sacrum - Cleanse with Vashe, pat dry, apply skin prep to periwound, allow to dry, apply santyl and topical antibiotic compound to open area of  Sacrum, cover with gauze, abd, tape.   Change daily and prn soilage      Right heel/left achilles/left anterior foot: cleanse with wound cleanser, paint areas with betadine, allow to dry, cover with gauze, abd, wrap lightly with kerlix, secure with tape, change every other day         MD recommends, delaying evaluation for skin substitute due to high suspicion of infection to sacral region and deterioration of wound bed.         Continue to offload, turn q2hrs, moisture management        Follow up in about 1 week (around 11/14/2023) for md visit .

## 2023-11-14 ENCOUNTER — HOSPITAL ENCOUNTER (OUTPATIENT)
Dept: WOUND CARE | Facility: HOSPITAL | Age: 58
Discharge: HOME | End: 2023-11-14
Attending: STUDENT IN AN ORGANIZED HEALTH CARE EDUCATION/TRAINING PROGRAM
Payer: MEDICARE

## 2023-11-14 VITALS
TEMPERATURE: 98 F | SYSTOLIC BLOOD PRESSURE: 110 MMHG | HEART RATE: 105 BPM | DIASTOLIC BLOOD PRESSURE: 79 MMHG | OXYGEN SATURATION: 98 % | RESPIRATION RATE: 18 BRPM

## 2023-11-14 DIAGNOSIS — L89.44 PRESSURE INJURY OF CONTIGUOUS REGION INVOLVING BACK AND BUTTOCK, STAGE 4, UNSPECIFIED LATERALITY: ICD-10-CM

## 2023-11-14 DIAGNOSIS — L89.314 PRESSURE INJURY OF RIGHT ISCHIUM, STAGE 4: Primary | ICD-10-CM

## 2023-11-14 DIAGNOSIS — S71.002S OPEN WOUND OF LEFT HIP, SEQUELA: ICD-10-CM

## 2023-11-14 DIAGNOSIS — L89.520 PRESSURE INJURY OF LEFT ANKLE, UNSTAGEABLE: ICD-10-CM

## 2023-11-14 DIAGNOSIS — L89.613 PRESSURE INJURY OF RIGHT HEEL, STAGE 3: ICD-10-CM

## 2023-11-14 DIAGNOSIS — L89.150 UNSTAGEABLE PRESSURE ULCER OF SACRAL REGION: ICD-10-CM

## 2023-11-14 PROCEDURE — 99213 PR OFFICE/OUTPT VISIT, EST, LEVL III, 20-29 MIN: ICD-10-PCS | Mod: ,,, | Performed by: PEDIATRICS

## 2023-11-14 PROCEDURE — 99213 OFFICE O/P EST LOW 20 MIN: CPT | Mod: ,,, | Performed by: PEDIATRICS

## 2023-11-14 PROCEDURE — 99214 OFFICE O/P EST MOD 30 MIN: CPT

## 2023-11-14 PROCEDURE — 27000999 HC MEDICAL RECORD PHOTO DOCUMENTATION

## 2023-11-14 NOTE — PATIENT INSTRUCTIONS
Sacrum, L hip, R posterior thigh    Cleanse with Vashe - pat dry, apply skin prep to periwound, allow to dry, apply Santyl to areas of non viable tissue,  topical antibiotic compound to open areas,  cover with gauze, abd pad, medfix tape   Change daily and prn soilage      Right heel/left achilles/left anterior foot: cleanse with wound cleanser, paint areas with betadine, allow to dry, cover with gauze, abd, wrap lightly with kerlix, secure with tape, change every other day         MD recommends, delaying evaluation for skin substitute due to high suspicion of infection to sacral region and deterioration of wound bed.         Continue to offload, turn q2hrs, moisture management

## 2023-11-14 NOTE — PROGRESS NOTES
Subjective:       Patient ID: Antonio Galvan II is a 58 y.o. male.    Chief Complaint: Pressure Ulcer (Multiple pressure ulcers, R and L hip, sacrum, R heel, L anterior and posterior foot/leg ulcerations.  Here for re-evaluation and treatment, as well as lab results obtained after last visit.  )    HPI  Review of Systems      Objective:      Temp:  [97.9 °F (36.6 °C)]   Pulse:  [105]   Resp:  [18]   BP: (110)/(79)   SpO2:  [98 %]   Physical Exam       Altered Skin Integrity 05/06/22 1140 Right Heel  Full thickness tissue loss. Subcutaneous fat may be visible but bone, tendon or muscle are not exposed (Active)   05/06/22 1140   Altered Skin Integrity Present on Admission - Did Patient arrive to the hospital with altered skin?: yes   Side: Right   Orientation:    Location: Heel   Wound Number:    Is this injury device related?: No   Primary Wound Type:    Description of Altered Skin Integrity: Full thickness tissue loss. Subcutaneous fat may be visible but bone, tendon or muscle are not exposed   Ankle-Brachial Index:    Pulses:    Removal Indication and Assessment:    Wound Outcome:    (Retired) Wound Length (cm):    (Retired) Wound Width (cm):    (Retired) Depth (cm):    Wound Description (Comments):    Removal Indications:    Wound Image   11/14/23 1443   Dressing Appearance Dry;Intact;Clean 11/14/23 1443   Drainage Amount None 11/14/23 1443   Appearance Closed/resurfaced 11/14/23 1443   Periwound Area Scar tissue;Dry 11/14/23 1443   Wound Edges Undefined 11/14/23 1443   Wound Length (cm) 0 cm 11/14/23 1443   Wound Width (cm) 0 cm 11/14/23 1443   Wound Depth (cm) 0 cm 11/14/23 1443   Wound Volume (cm^3) 0 cm^3 11/14/23 1443   Wound Surface Area (cm^2) 0 cm^2 11/14/23 1443   Care Cleansed with:;Sterile normal saline;Applied:;Povidone iodine 11/14/23 1443   Dressing Applied;Gauze;Absorptive Pad;Rolled gauze 11/14/23 1443   Periwound Care Dry periwound area maintained 11/14/23 1443            Altered Skin  Integrity 01/12/23 1530 Sacral spine Full thickness tissue loss with exposed bone, tendon, or muscle. Often includes undermining and tunneling. May extend into muscle and/or supporting structures. (Active)   01/12/23 1530   Altered Skin Integrity Present on Admission - Did Patient arrive to the hospital with altered skin?: yes   Side:    Orientation:    Location: Sacral spine   Wound Number:    Is this injury device related?:    Primary Wound Type:    Description of Altered Skin Integrity: Full thickness tissue loss with exposed bone, tendon, or muscle. Often includes undermining and tunneling. May extend into muscle and/or supporting structures.   Ankle-Brachial Index:    Pulses:    Removal Indication and Assessment:    Wound Outcome:    (Retired) Wound Length (cm):    (Retired) Wound Width (cm):    (Retired) Depth (cm):    Wound Description (Comments):    Removal Indications:    Wound Image   11/14/23 1443   Description of Altered Skin Integrity Full thickness tissue loss. Base is covered by slough and/or eschar in the wound bed 11/14/23 1443   Dressing Appearance Intact;Moist drainage 11/14/23 1443   Drainage Amount Moderate 11/14/23 1443   Drainage Characteristics/Odor Serosanguineous 11/14/23 1443   Appearance Red;Granulating;Tan;Gray;Yellow;White;Fibrin;Slough 11/14/23 1443   Tissue loss description Full thickness 11/14/23 1443   Red (%), Wound Tissue Color 70 % 11/14/23 1443   Yellow (%), Wound Tissue Color 30 % 11/14/23 1443   Periwound Area Scar tissue 11/14/23 1443   Wound Edges Irregular 11/14/23 1443   Wound Length (cm) 5.4 cm 11/14/23 1443   Wound Width (cm) 4.6 cm 11/14/23 1443   Wound Depth (cm) 0.4 cm 11/14/23 1443   Wound Volume (cm^3) 9.936 cm^3 11/14/23 1443   Wound Surface Area (cm^2) 24.84 cm^2 11/14/23 1443   Care Cleansed with:;Antimicrobial agent 11/14/23 1443   Dressing Applied;Gauze;Absorptive Pad 11/14/23 1443   Periwound Care Skin barrier film applied 11/14/23 1443            Altered Skin  Integrity 08/01/23 1534 Left posterior Ankle Full thickness tissue loss. Base is covered by slough and/or eschar in the wound bed (Active)   08/01/23 1534   Altered Skin Integrity Present on Admission - Did Patient arrive to the hospital with altered skin?: yes   Side: Left   Orientation: posterior   Location: Ankle   Wound Number:    Is this injury device related?:    Primary Wound Type:    Description of Altered Skin Integrity: Full thickness tissue loss. Base is covered by slough and/or eschar in the wound bed   Ankle-Brachial Index:    Pulses: 2 + palpable, strong doppler signal per md   Removal Indication and Assessment:    Wound Outcome:    (Retired) Wound Length (cm):    (Retired) Wound Width (cm):    (Retired) Depth (cm):    Wound Description (Comments):    Removal Indications:    Wound Image   11/14/23 1443   Dressing Appearance Intact;Dry 11/14/23 1443   Drainage Amount None 11/14/23 1443   Appearance Black;Dry;Eschar 11/14/23 1443   Black (%), Wound Tissue Color 100 % 11/14/23 1443   Periwound Area Dry;Ecchymotic;Maroon 11/14/23 1443   Wound Edges Defined 11/14/23 1443   Wound Length (cm) 2 cm 11/14/23 1443   Wound Width (cm) 1.8 cm 11/14/23 1443   Wound Surface Area (cm^2) 3.6 cm^2 11/14/23 1443   Care Cleansed with:;Sterile normal saline;Applied:;Povidone iodine 11/14/23 1443   Dressing Applied;Gauze;Absorptive Pad;Rolled gauze 11/14/23 1443   Periwound Care Dry periwound area maintained 11/14/23 1443            Altered Skin Integrity 08/01/23 1535 Left anterior Ankle Other (comment) Full thickness tissue loss. Base is covered by slough and/or eschar in the wound bed (Active)   08/01/23 1535   Altered Skin Integrity Present on Admission - Did Patient arrive to the hospital with altered skin?: yes   Side: Left   Orientation: anterior   Location: Ankle   Wound Number:    Is this injury device related?: No   Primary Wound Type: Other   Description of Altered Skin Integrity: Full thickness tissue loss. Base  is covered by slough and/or eschar in the wound bed   Ankle-Brachial Index:    Pulses: 2+ palpable and strong doppler pulses per MD   Removal Indication and Assessment:    Wound Outcome:    (Retired) Wound Length (cm):    (Retired) Wound Width (cm):    (Retired) Depth (cm):    Wound Description (Comments):    Removal Indications:    Wound Image   11/14/23 1443   Dressing Appearance Intact;Dry;Clean 11/14/23 1443   Drainage Amount None 11/14/23 1443   Appearance Black;Dry;Eschar 11/14/23 1443   Black (%), Wound Tissue Color 100 % 11/14/23 1443   Periwound Area Scar tissue 11/14/23 1443   Wound Edges Defined 11/14/23 1443   Wound Length (cm) 2.3 cm 11/14/23 1443   Wound Width (cm) 2.1 cm 11/14/23 1443   Wound Surface Area (cm^2) 4.83 cm^2 11/14/23 1443   Care Cleansed with:;Sterile normal saline 11/14/23 1443   Dressing Applied;Gauze;Absorptive Pad;Rolled gauze 11/14/23 1443   Periwound Care Dry periwound area maintained 11/14/23 1443            Incision/Site 08/16/23 2011 Right Hip posterior (Active)   08/16/23 2011   Present Prior to Hospital Arrival?:    Side: Right   Location: Hip   Orientation: posterior   Incision Type:    Closure Method:    Additional Comments:    Removal Indication and Assessment:    Wound Outcome:    Removal Indications:    Wound Image   11/14/23 1443   Dressing Appearance Intact;Moist drainage 11/14/23 1443   Drainage Amount Moderate 11/14/23 1443   Drainage Characteristics/Odor Serosanguineous;No odor;Bleeding controlled 11/14/23 1443   Appearance Red;Granulating;Tan;Yellow;Slough;Adipose 11/14/23 1443   Red (%), Wound Tissue Color 95 % 11/14/23 1443   Yellow (%), Wound Tissue Color 5 % 11/14/23 1443   Periwound Area Scar tissue;Moist 11/14/23 1443   Wound Edges Defined;Rolled/closed 11/14/23 1443   Wound Length (cm) 10.7 cm 11/14/23 1443   Wound Width (cm) 13.4 cm 11/14/23 1443   Wound Depth (cm) 0.8 cm 11/14/23 1443   Wound Volume (cm^3) 114.704 cm^3 11/14/23 1443   Wound Surface Area  (cm^2) 143.38 cm^2 11/14/23 1443   Undermining (depth (cm)/location) 11 to 2 o'clock / 2.5cm at 12 o'clock 11/14/23 1443   Care Cleansed with:;Antimicrobial agent 11/14/23 1443   Dressing Applied;Gauze;Absorptive Pad 11/14/23 1443   Periwound Care Skin barrier film applied 11/14/23 1443            Incision/Site 08/16/23 2011 Left Hip lateral (Active)   08/16/23 2011   Present Prior to Hospital Arrival?:    Side: Left   Location: Hip   Orientation: lateral   Incision Type:    Closure Method:    Additional Comments:    Removal Indication and Assessment:    Wound Outcome:    Removal Indications:    Wound Image   11/14/23 1443   Dressing Appearance Intact;Moist drainage 11/14/23 1443   Drainage Amount Moderate 11/14/23 1443   Drainage Characteristics/Odor Serosanguineous;No odor;Bleeding controlled 11/14/23 1443   Appearance Red;Granulating;White;Fibrin 11/14/23 1443   Red (%), Wound Tissue Color 100 % 11/14/23 1443   Periwound Area Intact 11/14/23 1443   Wound Edges Defined 11/14/23 1443   Wound Length (cm) 7.5 cm 11/14/23 1443   Wound Width (cm) 3.5 cm 11/14/23 1443   Wound Depth (cm) 2.3 cm 11/14/23 1443   Wound Volume (cm^3) 60.375 cm^3 11/14/23 1443   Wound Surface Area (cm^2) 26.25 cm^2 11/14/23 1443   Undermining (depth (cm)/location) at 3 and at 9 o'clock / undermining 1.5cm 11/14/23 1443   Care Cleansed with:;Antimicrobial agent 11/14/23 1443   Dressing Applied;Gauze;Absorptive Pad;Other (comment) 11/14/23 1443         Assessment:     Today eschar of the left foot are getting smaller.  These were paint it with Betadine and are slowly improving.  Sacral wound is improving but still has necrotic sections.  Topical powder Santyl was applied.  Today right hip is well granulated with very small area of slough.  Santyl was areas and antibiotic powder.  Left hip is improving.  Topical antibiotic and Santyl was applied.  Patient will return in 1 week for physician visit.    ICD-10-CM ICD-9-CM   1. Pressure injury of  right ischium, stage 4  L89.314 707.05     707.24   2. Open wound of left hip, sequela  S71.002S 906.1   3. Pressure injury of right heel, stage 3  L89.613 707.07     707.23   4. Unstageable pressure ulcer of sacral region  L89.150 707.03     707.25   5. Pressure injury of left ankle, unstageable  L89.520 707.06     707.25   6. 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, stage 4, unspecified laterality  L89.44 707.09     707.24         Plan:   Tissue pathology and/or culture taken:  [] Yes [x] No   Sharp debridement performed:   [] Yes [x] No   Labs ordered this visit:   [] Yes [x] No   Imaging ordered this visit:   [] Yes [x] No           Orders Placed This Encounter   Procedures    Change dressing     Sacrum, L hip, R posterior thigh    Cleanse with Vashe - pat dry, apply skin prep to periwound, allow to dry, apply Santyl to areas of non viable tissue,  topical antibiotic compound to open areas,  cover with gauze, abd pad, medfix tape   Change daily and prn soilage      Right heel/left achilles/left anterior foot: cleanse with wound cleanser, paint areas with betadine, allow to dry, cover with gauze, abd, wrap lightly with kerlix, secure with tape, change every other day         MD recommends, delaying evaluation for skin substitute due to high suspicion of infection to sacral region and deterioration of wound bed.         Continue to offload, turn q2hrs, moisture management        Follow up in about 1 week (around 11/21/2023) for md visit .

## 2023-11-21 ENCOUNTER — HOSPITAL ENCOUNTER (OUTPATIENT)
Dept: WOUND CARE | Facility: HOSPITAL | Age: 58
Discharge: HOME | End: 2023-11-21
Attending: STUDENT IN AN ORGANIZED HEALTH CARE EDUCATION/TRAINING PROGRAM
Payer: MEDICARE

## 2023-11-21 DIAGNOSIS — L89.314 PRESSURE INJURY OF RIGHT ISCHIUM, STAGE 4: Primary | ICD-10-CM

## 2023-11-21 DIAGNOSIS — S71.002S OPEN WOUND OF LEFT HIP, SEQUELA: ICD-10-CM

## 2023-11-21 DIAGNOSIS — L89.520 PRESSURE INJURY OF LEFT ANKLE, UNSTAGEABLE: ICD-10-CM

## 2023-11-21 DIAGNOSIS — L89.613 PRESSURE INJURY OF RIGHT HEEL, STAGE 3: ICD-10-CM

## 2023-11-21 DIAGNOSIS — L89.150 UNSTAGEABLE PRESSURE ULCER OF SACRAL REGION: ICD-10-CM

## 2023-11-21 PROCEDURE — 97597 DBRDMT OPN WND 1ST 20 CM/<: CPT

## 2023-11-21 PROCEDURE — 99213 OFFICE O/P EST LOW 20 MIN: CPT | Mod: 25,,, | Performed by: PEDIATRICS

## 2023-11-21 PROCEDURE — 11042 DEBRIDEMENT: ICD-10-PCS | Mod: 59,,, | Performed by: PEDIATRICS

## 2023-11-21 PROCEDURE — 11043 DBRDMT MUSC&/FSCA 1ST 20/<: CPT

## 2023-11-21 PROCEDURE — 11043 DBRDMT MUSC&/FSCA 1ST 20/<: CPT | Mod: ,,, | Performed by: PEDIATRICS

## 2023-11-21 PROCEDURE — 99213 PR OFFICE/OUTPT VISIT, EST, LEVL III, 20-29 MIN: ICD-10-PCS | Mod: 25,,, | Performed by: PEDIATRICS

## 2023-11-21 PROCEDURE — 11042 DBRDMT SUBQ TIS 1ST 20SQCM/<: CPT

## 2023-11-21 PROCEDURE — 99215 OFFICE O/P EST HI 40 MIN: CPT

## 2023-11-21 PROCEDURE — 11042 DBRDMT SUBQ TIS 1ST 20SQCM/<: CPT | Mod: 59,,, | Performed by: PEDIATRICS

## 2023-11-21 PROCEDURE — 97597 DBRDMT OPN WND 1ST 20 CM/<: CPT | Mod: 59,,, | Performed by: PEDIATRICS

## 2023-11-21 PROCEDURE — 97597 DEBRIDEMENT: ICD-10-PCS | Mod: 59,,, | Performed by: PEDIATRICS

## 2023-11-21 PROCEDURE — 11043 DEBRIDEMENT: ICD-10-PCS | Mod: ,,, | Performed by: PEDIATRICS

## 2023-11-21 PROCEDURE — 27000999 HC MEDICAL RECORD PHOTO DOCUMENTATION

## 2023-11-21 NOTE — PROCEDURES
"Debridement    Date/Time: 11/21/2023 1:23 PM    Performed by: Ronald Barcenas MD  Authorized by: Ronald Barcenas MD  Associated wounds:        Altered Skin Integrity 08/01/23 1535 Left anterior Ankle Other (comment) Full thickness tissue loss. Base is covered by slough and/or eschar in the wound bed       Altered Skin Integrity 08/01/23 1534 Left posterior Ankle Full thickness tissue loss. Base is covered by slough and/or eschar in the wound bed       Altered Skin Integrity 01/12/23 1530 Sacral spine Full thickness tissue loss with exposed bone, tendon, or muscle. Often includes undermining and tunneling. May extend into muscle and/or supporting structures.  Time out: Immediately prior to procedure a "time out" was called to verify the correct patient, procedure, equipment, support staff and site/side marked as required.      Preparation: Patient was prepped and draped with clean technique    Local anesthesia used?: No      Wound Details:    Location:  Left foot    Location:  Left Dorsal Foot    Type of Debridement:  Excisional       Length (cm):  1.8       Area (sq cm):  2.52       Width (cm):  1.4       Percent Debrided (%):  100       Depth (cm):  0.1       Total Area Debrided (sq cm):  2.52    Depth of debridement:  Epidermis/Dermis    Tissue debrided:  Epidermis, Dermis and Subcutaneous    Devitalized tissue debrided:  Necrotic/Eschar    Instruments:  Blade  Bleeding:  Minimal  Hemostasis Achieved: Yes  Method Used:  Pressure    2nd Wound Details:     Location:  Left foot    Location:  Left Ankle    Location:  Left Ankle    Type of Debridement:  Excisional       Length (cm):  1.4       Area (sq cm):  1.54       Width (cm):  1.1       Percent Debrided (%):  100       Depth (cm):  0.1       Total Area Debrided (sq cm):  1.54    Depth of debridement:  Subcutaneous tissue    Tissue debrided:  Dermis and Epidermis    Devitalized tissue debrided:  Necrotic/Eschar    Instruments:  Blade  Bleeding:  " Minimal  Hemostasis Achieved: Yes    Method Used:  Pressure    3rd Wound Details:     Location:  Back    Type of Debridement:  Excisional       Length (cm):  6.6       Area (sq cm):  43.56       Width (cm):  6.6       Percent Debrided (%):  30       Depth (cm):  1.3       Total Area Debrided (sq cm):  13.07    Depth of debridement:  Muscle/fascia/tendon    Tissue debrided:  Adipose, Muscle and Subcutaneous    Devitalized tissue debrided:  Fibrin, Necrotic/Eschar and Slough    Instruments:  Curette and Blade  Bleeding:  Minimal  Hemostasis Achieved: Yes    Method Used:  Pressure  Patient tolerance:  Patient tolerated the procedure well with no immediate complications

## 2023-11-21 NOTE — PROGRESS NOTES
Subjective:       Patient ID: Antonio Galvan II is a 58 y.o. male.    Chief Complaint: Pressure Ulcer (R posterior thigh, L hip , sacral pressure ulcers.   L anterior/posterior ankle ulcerations.  R heel.   Follow up with md for re-evaluation and treatment with md. )    HPI  Review of Systems      Objective:         Physical Exam       Altered Skin Integrity 05/06/22 1140 Right Heel  Full thickness tissue loss. Subcutaneous fat may be visible but bone, tendon or muscle are not exposed (Active)   05/06/22 1140   Altered Skin Integrity Present on Admission - Did Patient arrive to the hospital with altered skin?: yes   Side: Right   Orientation:    Location: Heel   Wound Number:    Is this injury device related?: No   Primary Wound Type:    Description of Altered Skin Integrity: Full thickness tissue loss. Subcutaneous fat may be visible but bone, tendon or muscle are not exposed   Ankle-Brachial Index:    Pulses:    Removal Indication and Assessment:    Wound Outcome:    (Retired) Wound Length (cm):    (Retired) Wound Width (cm):    (Retired) Depth (cm):    Wound Description (Comments):    Removal Indications:    Wound Image   11/21/23 1438   Dressing Appearance Intact;Dry 11/21/23 1438   Drainage Amount None 11/21/23 1438   Appearance Closed/resurfaced 11/21/23 1438   Periwound Area Scar tissue 11/21/23 1438   Wound Edges Irregular 11/21/23 1438   Wound Length (cm) 0.1 cm 11/21/23 1438   Wound Width (cm) 0.1 cm 11/21/23 1438   Wound Surface Area (cm^2) 0.01 cm^2 11/21/23 1438   Care Cleansed with:;Antimicrobial agent;Applied:;Povidone iodine 11/21/23 1438   Dressing Applied;Gauze;Absorptive Pad;Rolled gauze 11/21/23 1438   Periwound Care Dry periwound area maintained 11/21/23 1438            Altered Skin Integrity 01/12/23 1530 Sacral spine Full thickness tissue loss with exposed bone, tendon, or muscle. Often includes undermining and tunneling. May extend into muscle and/or supporting structures. (Active)    01/12/23 1530   Altered Skin Integrity Present on Admission - Did Patient arrive to the hospital with altered skin?: yes   Side:    Orientation:    Location: Sacral spine   Wound Number:    Is this injury device related?:    Primary Wound Type:    Description of Altered Skin Integrity: Full thickness tissue loss with exposed bone, tendon, or muscle. Often includes undermining and tunneling. May extend into muscle and/or supporting structures.   Ankle-Brachial Index:    Pulses:    Removal Indication and Assessment:    Wound Outcome:    (Retired) Wound Length (cm):    (Retired) Wound Width (cm):    (Retired) Depth (cm):    Wound Description (Comments):    Removal Indications:    Wound Image   11/21/23 1438   Description of Altered Skin Integrity Full thickness tissue loss with exposed bone, tendon, or muscle. Often includes undermining and tunneling. May extend into muscle and/or supporting structures. 11/21/23 1438   Drainage Amount Large 11/21/23 1438   Drainage Characteristics/Odor Serosanguineous;No odor;Bleeding controlled 11/21/23 1438   Appearance Red;Granulating;White;Yellow;Tan;Gray 11/21/23 1438   Tissue loss description Full thickness 11/21/23 1438   Red (%), Wound Tissue Color 70 % 11/21/23 1438   Yellow (%), Wound Tissue Color 30 % 11/21/23 1438   Periwound Area Scar tissue;Moist 11/21/23 1438   Wound Edges Irregular 11/21/23 1438   Wound Length (cm) 6.5 cm 11/21/23 1438   Wound Width (cm) 6.5 cm 11/21/23 1438   Wound Depth (cm) 1.2 cm 11/21/23 1438   Wound Volume (cm^3) 50.7 cm^3 11/21/23 1438   Wound Surface Area (cm^2) 42.25 cm^2 11/21/23 1438   Care Cleansed with:;Antimicrobial agent;Debrided 11/21/23 1438   Dressing Applied;Sodium chloride impregnated;Gauze;Absorptive Pad 11/21/23 1438   Periwound Care Skin barrier film applied;Topical treatment applied 11/21/23 1438            Altered Skin Integrity 08/01/23 1534 Left posterior Ankle Full thickness tissue loss. Base is covered by slough and/or  eschar in the wound bed (Active)   08/01/23 1534   Altered Skin Integrity Present on Admission - Did Patient arrive to the hospital with altered skin?: yes   Side: Left   Orientation: posterior   Location: Ankle   Wound Number:    Is this injury device related?:    Primary Wound Type:    Description of Altered Skin Integrity: Full thickness tissue loss. Base is covered by slough and/or eschar in the wound bed   Ankle-Brachial Index:    Pulses: 2 + palpable, strong doppler signal per md   Removal Indication and Assessment:    Wound Outcome:    (Retired) Wound Length (cm):    (Retired) Wound Width (cm):    (Retired) Depth (cm):    Wound Description (Comments):    Removal Indications:    Wound Image    11/21/23 1438   Dressing Appearance Intact;Dry 11/21/23 1438   Drainage Amount None 11/21/23 1438   Appearance Black;Dry 11/21/23 1438   Black (%), Wound Tissue Color 100 % 11/21/23 1438   Periwound Area Dry;Ecchymotic 11/21/23 1438   Wound Edges Defined 11/21/23 1438   Wound Length (cm) 1.3 cm 11/21/23 1438   Wound Width (cm) 1 cm 11/21/23 1438   Wound Surface Area (cm^2) 1.3 cm^2 11/21/23 1438   Care Cleansed with:;Antimicrobial agent;Debrided 11/21/23 1438   Dressing Applied;Honey;Sodium chloride impregnated;Gauze;Absorptive Pad;Rolled gauze 11/21/23 1438   Periwound Care Dry periwound area maintained 11/21/23 1438            Altered Skin Integrity 08/01/23 1535 Left anterior Ankle Other (comment) Full thickness tissue loss. Base is covered by slough and/or eschar in the wound bed (Active)   08/01/23 1535   Altered Skin Integrity Present on Admission - Did Patient arrive to the hospital with altered skin?: yes   Side: Left   Orientation: anterior   Location: Ankle   Wound Number:    Is this injury device related?: No   Primary Wound Type: Other   Description of Altered Skin Integrity: Full thickness tissue loss. Base is covered by slough and/or eschar in the wound bed   Ankle-Brachial Index:    Pulses: 2+ palpable and  strong doppler pulses per MD   Removal Indication and Assessment:    Wound Outcome:    (Retired) Wound Length (cm):    (Retired) Wound Width (cm):    (Retired) Depth (cm):    Wound Description (Comments):    Removal Indications:    Wound Image    11/21/23 1438   Dressing Appearance Intact;Dry 11/21/23 1438   Drainage Amount None 11/21/23 1438   Appearance Black;Dry 11/21/23 1438   Black (%), Wound Tissue Color 100 % 11/21/23 1438   Periwound Area Scar tissue 11/21/23 1438   Wound Edges Defined 11/21/23 1438   Wound Length (cm) 1.7 cm 11/21/23 1438   Wound Width (cm) 1.3 cm 11/21/23 1438   Wound Surface Area (cm^2) 2.21 cm^2 11/21/23 1438   Care Cleansed with:;Antimicrobial agent;Debrided 11/21/23 1438   Dressing Applied;Sodium chloride impregnated;Honey;Gauze;Absorptive Pad;Rolled gauze 11/21/23 1438   Periwound Care Dry periwound area maintained 11/21/23 1438            Incision/Site 08/16/23 2011 Right Hip posterior (Active)   08/16/23 2011   Present Prior to Hospital Arrival?:    Side: Right   Location: Hip   Orientation: posterior   Incision Type:    Closure Method:    Additional Comments:    Removal Indication and Assessment:    Wound Outcome:    Removal Indications:    Wound Image   11/21/23 1438   Dressing Appearance Intact;Moist drainage 11/21/23 1438   Drainage Amount Large 11/21/23 1438   Appearance Red;Granulating;White;Tan;Fibrin 11/21/23 1438   Red (%), Wound Tissue Color 95 % 11/21/23 1438   Yellow (%), Wound Tissue Color 5 % 11/21/23 1438   Periwound Area Scar tissue 11/21/23 1438   Wound Edges Defined 11/21/23 1438   Wound Length (cm) 10.5 cm 11/21/23 1438   Wound Width (cm) 13 cm 11/21/23 1438   Wound Depth (cm) 1 cm 11/21/23 1438   Wound Volume (cm^3) 136.5 cm^3 11/21/23 1438   Wound Surface Area (cm^2) 136.5 cm^2 11/21/23 1438   Undermining (depth (cm)/location) 11 to 2 o'clock / 0.5cm 11/21/23 1438   Care Cleansed with:;Antimicrobial agent 11/21/23 1438   Dressing Applied;Sodium chloride  impregnated;Gauze;Absorptive Pad 11/21/23 1438   Periwound Care Skin barrier film applied 11/21/23 1438            Incision/Site 08/16/23 2011 Left Hip lateral (Active)   08/16/23 2011   Present Prior to Hospital Arrival?:    Side: Left   Location: Hip   Orientation: lateral   Incision Type:    Closure Method:    Additional Comments:    Removal Indication and Assessment:    Wound Outcome:    Removal Indications:    Wound Image   11/21/23 1438   Dressing Appearance Intact;Moist drainage 11/21/23 1438   Drainage Amount Large 11/21/23 1438   Drainage Characteristics/Odor Serosanguineous 11/21/23 1438   Appearance Pink;Red;Granulating 11/21/23 1438   Red (%), Wound Tissue Color 100 % 11/21/23 1438   Periwound Area Intact 11/21/23 1438   Wound Edges Defined 11/21/23 1438   Wound Length (cm) 6.8 cm 11/21/23 1438   Wound Width (cm) 3.1 cm 11/21/23 1438   Wound Depth (cm) 1.8 cm 11/21/23 1438   Wound Volume (cm^3) 37.944 cm^3 11/21/23 1438   Wound Surface Area (cm^2) 21.08 cm^2 11/21/23 1438   Undermining (depth (cm)/location) at 3 o'clock / 2cm  and at 9 o'clock 1cm 11/21/23 1438   Care Cleansed with:;Antimicrobial agent 11/21/23 1438   Dressing Applied;Collagen;Gauze;Absorptive Pad 11/21/23 1438   Periwound Care Skin barrier film applied 11/21/23 1438         Assessment:     Today right foot wound is improving.  Left dorsum foot with eschar which was removed with excisional debridement.  Also left posterior ankle eschar removed.  See procedure note.  Sacral wound improving.  Large amount of necrotic tissue present at this time.  Because of this a excisional debridement was done.  See procedure note.  Right ischium is improving with good granulation tissue.  Left hip is much smaller with good granulation tissue.  Left foot wounds will be treated with Santyl and Mesalt.  Sacral wound be treated with Santyl and topical antibiotic compound.  Also right thigh with Santyl and topical antibiotic.  Left hip with collagen.   Dressings will be changed according to orders.  Will contact as  medical because patient will be ready for skin substitute on both upper leg wounds.  Patient 1 week for MD visit    ICD-10-CM ICD-9-CM   1. Pressure injury of right ischium, stage 4  L89.314 707.05     707.24   2. Open wound of left hip, sequela  S71.002S 906.1   3. Pressure injury of right heel, stage 3  L89.613 707.07     707.23   4. Unstageable pressure ulcer of sacral region  L89.150 707.03     707.25   5. Pressure injury of left ankle, unstageable  L89.520 707.06     707.25         Plan:   Tissue pathology and/or culture taken:  [] Yes [x] No   Sharp debridement performed:   [x] Yes [] No   Labs ordered this visit:   [] Yes [x] No   Imaging ordered this visit:   [] Yes [x] No           Orders Placed This Encounter   Procedures    Change dressing     Sacrum,  R posterior thigh    Cleanse with Vashe - pat dry, apply skin prep to periwound, allow to dry, apply Santyl to areas of non viable tissue,  topical antibiotic compound to open areas,  cover with gauze, abd pad, medfix tape   Change daily and prn soilage     L hip - Cleanse with wound cleanser- apply promogran to wound base (as if lining like a coffee filter) place fluffed gauze in the center, cover with 4x4 gauze, abd, tape.   Change daily     Right heel- cleanse with wound cleanser, paint areas with betadine, allow to dry, cover with gauze, abd, wrap lightly with kerlix, secure with tape, change every other day     left achilles/left anterior foot: cleanse with wound cleanser, Apply Santyl, mesalt, cover with gauze, abd, wrap lightly with kerlix, secure with tape, change daily      MD recommends, delaying evaluation for skin substitute due to high suspicion of infection to sacral region and deterioration of wound bed.         Continue to offload, turn q2hrs, moisture management        Follow up in about 1 week (around 11/28/2023) for md visit .

## 2023-11-21 NOTE — PATIENT INSTRUCTIONS
Sacrum,  R posterior thigh    Cleanse with Vashe - pat dry, apply skin prep to periwound, allow to dry, apply Santyl to areas of non viable tissue,  topical antibiotic compound to open areas,  cover with gauze, abd pad, medfix tape   Change daily and prn soilage     L hip - Cleanse with wound cleanser- apply promogran to wound base (as if lining like a coffee filter) place fluffed gauze in the center, cover with 4x4 gauze, abd, tape.   Change daily     Right heel- cleanse with wound cleanser, paint areas with betadine, allow to dry, cover with gauze, abd, wrap lightly with kerlix, secure with tape, change every other day     left achilles/left anterior foot: cleanse with wound cleanser, Apply Santyl, mesalt, cover with gauze, abd, wrap lightly with kerlix, secure with tape, change daily      MD recommends, delaying evaluation for skin substitute due to high suspicion of infection to sacral region and deterioration of wound bed.         Continue to offload, turn q2hrs, moisture management

## 2023-11-28 ENCOUNTER — HOSPITAL ENCOUNTER (OUTPATIENT)
Dept: WOUND CARE | Facility: HOSPITAL | Age: 58
Discharge: HOME | End: 2023-11-28
Attending: STUDENT IN AN ORGANIZED HEALTH CARE EDUCATION/TRAINING PROGRAM
Payer: MEDICARE

## 2023-11-28 VITALS
DIASTOLIC BLOOD PRESSURE: 78 MMHG | TEMPERATURE: 98 F | OXYGEN SATURATION: 99 % | SYSTOLIC BLOOD PRESSURE: 109 MMHG | HEART RATE: 123 BPM

## 2023-11-28 DIAGNOSIS — L89.152 SACRAL DECUBITUS ULCER, STAGE II: Chronic | ICD-10-CM

## 2023-11-28 DIAGNOSIS — L89.314 PRESSURE INJURY OF RIGHT ISCHIUM, STAGE 4: Primary | Chronic | ICD-10-CM

## 2023-11-28 PROCEDURE — 99213 PR OFFICE/OUTPT VISIT, EST, LEVL III, 20-29 MIN: ICD-10-PCS | Mod: ,,, | Performed by: PEDIATRICS

## 2023-11-28 PROCEDURE — 27000999 HC MEDICAL RECORD PHOTO DOCUMENTATION

## 2023-11-28 PROCEDURE — 99213 OFFICE O/P EST LOW 20 MIN: CPT | Mod: ,,, | Performed by: PEDIATRICS

## 2023-11-28 PROCEDURE — 99214 OFFICE O/P EST MOD 30 MIN: CPT

## 2023-11-28 NOTE — PROGRESS NOTES
Subjective:       Patient ID: Antonio Galvan II is a 58 y.o. male.    Chief Complaint: Non-healing Wound Follow Up ((R posterior thigh, L hip , sacral pressure ulcers.   L anterior/posterior ankle ulcerations.  R heel.   Follow up with md for re-evaluation and treatment with md.)    HPI  Review of Systems      Objective:      Temp:  [97.8 °F (36.6 °C)]   Pulse:  [123]   BP: (109)/(78)   SpO2:  [99 %]   Physical Exam       Altered Skin Integrity 05/06/22 1140 Right Heel  Full thickness tissue loss. Subcutaneous fat may be visible but bone, tendon or muscle are not exposed (Active)   05/06/22 1140   Altered Skin Integrity Present on Admission - Did Patient arrive to the hospital with altered skin?: yes   Side: Right   Orientation:    Location: Heel   Wound Number:    Is this injury device related?: No   Primary Wound Type:    Description of Altered Skin Integrity: Full thickness tissue loss. Subcutaneous fat may be visible but bone, tendon or muscle are not exposed   Ankle-Brachial Index:    Pulses:    Removal Indication and Assessment:    Wound Outcome:    (Retired) Wound Length (cm):    (Retired) Wound Width (cm):    (Retired) Depth (cm):    Wound Description (Comments):    Removal Indications:    Wound Image   11/28/23 1504   Dressing Appearance Intact 11/28/23 1504   Drainage Amount Small 11/28/23 1504   Drainage Characteristics/Odor Serosanguineous 11/28/23 1504   Periwound Area Scar tissue 11/28/23 1504   Wound Edges Irregular 11/28/23 1504   Wound Length (cm) 2.8 cm 11/28/23 1504   Wound Width (cm) 1.9 cm 11/28/23 1504   Wound Depth (cm) 0.2 cm 11/28/23 1504   Wound Volume (cm^3) 1.064 cm^3 11/28/23 1504   Wound Surface Area (cm^2) 5.32 cm^2 11/28/23 1504   Care Cleansed with:;Antimicrobial agent 11/28/23 1504   Dressing Applied 11/28/23 1504            Altered Skin Integrity 01/12/23 1530 Sacral spine Full thickness tissue loss with exposed bone, tendon, or muscle. Often includes undermining and  tunneling. May extend into muscle and/or supporting structures. (Active)   01/12/23 1530   Altered Skin Integrity Present on Admission - Did Patient arrive to the hospital with altered skin?: yes   Side:    Orientation:    Location: Sacral spine   Wound Number:    Is this injury device related?:    Primary Wound Type:    Description of Altered Skin Integrity: Full thickness tissue loss with exposed bone, tendon, or muscle. Often includes undermining and tunneling. May extend into muscle and/or supporting structures.   Ankle-Brachial Index:    Pulses:    Removal Indication and Assessment:    Wound Outcome:    (Retired) Wound Length (cm):    (Retired) Wound Width (cm):    (Retired) Depth (cm):    Wound Description (Comments):    Removal Indications:    Wound Image   11/28/23 1504   Dressing Appearance Intact;Moist drainage 11/28/23 1504   Drainage Amount Moderate 11/28/23 1504   Drainage Characteristics/Odor Serosanguineous 11/28/23 1504   Appearance Pink;Red;Tan;Granulating 11/28/23 1504   Periwound Area Scar tissue;Moist 11/28/23 1504   Wound Edges Irregular 11/28/23 1504   Wound Length (cm) 7.2 cm 11/28/23 1504   Wound Width (cm) 6.8 cm 11/28/23 1504   Wound Depth (cm) 0.4 cm 11/28/23 1504   Wound Volume (cm^3) 19.584 cm^3 11/28/23 1504   Wound Surface Area (cm^2) 48.96 cm^2 11/28/23 1504   Care Cleansed with:;Antimicrobial agent 11/28/23 1504   Dressing Applied 11/28/23 1504   Periwound Care Skin barrier film applied 11/28/23 1504            Altered Skin Integrity 08/01/23 1534 Left posterior Ankle Full thickness tissue loss. Base is covered by slough and/or eschar in the wound bed (Active)   08/01/23 1534   Altered Skin Integrity Present on Admission - Did Patient arrive to the hospital with altered skin?: yes   Side: Left   Orientation: posterior   Location: Ankle   Wound Number:    Is this injury device related?:    Primary Wound Type:    Description of Altered Skin Integrity: Full thickness tissue loss. Base  is covered by slough and/or eschar in the wound bed   Ankle-Brachial Index:    Pulses: 2 + palpable, strong doppler signal per md   Removal Indication and Assessment:    Wound Outcome:    (Retired) Wound Length (cm):    (Retired) Wound Width (cm):    (Retired) Depth (cm):    Wound Description (Comments):    Removal Indications:    Wound Image   11/28/23 1504   Dressing Appearance Intact;Dry 11/28/23 1504   Drainage Amount None 11/28/23 1504   Drainage Characteristics/Odor No odor 11/28/23 1504   Black (%), Wound Tissue Color 100 % 11/28/23 1504   Periwound Area Dry;Ecchymotic 11/28/23 1504   Wound Edges Defined 11/28/23 1504   Wound Length (cm) 1.4 cm 11/28/23 1504   Wound Width (cm) 0.8 cm 11/28/23 1504   Wound Depth (cm) 0.2 cm 11/28/23 1504   Wound Volume (cm^3) 0.224 cm^3 11/28/23 1504   Wound Surface Area (cm^2) 1.12 cm^2 11/28/23 1504   Care Antimicrobial agent;Cleansed with: 11/28/23 1504   Dressing Applied 11/28/23 1504   Periwound Care Dry periwound area maintained 11/28/23 1504            Altered Skin Integrity 08/01/23 1535 Left anterior Ankle Other (comment) Full thickness tissue loss. Base is covered by slough and/or eschar in the wound bed (Active)   08/01/23 1535   Altered Skin Integrity Present on Admission - Did Patient arrive to the hospital with altered skin?: yes   Side: Left   Orientation: anterior   Location: Ankle   Wound Number:    Is this injury device related?: No   Primary Wound Type: Other   Description of Altered Skin Integrity: Full thickness tissue loss. Base is covered by slough and/or eschar in the wound bed   Ankle-Brachial Index:    Pulses: 2+ palpable and strong doppler pulses per MD   Removal Indication and Assessment:    Wound Outcome:    (Retired) Wound Length (cm):    (Retired) Wound Width (cm):    (Retired) Depth (cm):    Wound Description (Comments):    Removal Indications:    Wound Image   11/28/23 1504   Dressing Appearance Dry;Intact 11/28/23 1504   Drainage Amount None  11/28/23 1504   Drainage Characteristics/Odor No odor 11/28/23 1504   Black (%), Wound Tissue Color 100 % 11/28/23 1504   Periwound Area Scar tissue 11/28/23 1504   Wound Edges Defined 11/28/23 1504   Wound Length (cm) 1.1 cm 11/28/23 1504   Wound Width (cm) 1.2 cm 11/28/23 1504   Wound Depth (cm) 0.1 cm 11/28/23 1504   Wound Volume (cm^3) 0.132 cm^3 11/28/23 1504   Wound Surface Area (cm^2) 1.32 cm^2 11/28/23 1504   Care Cleansed with:;Antimicrobial agent 11/28/23 1504   Dressing Applied 11/28/23 1504            Incision/Site 08/16/23 2011 Right Hip posterior (Active)   08/16/23 2011   Present Prior to Hospital Arrival?:    Side: Right   Location: Hip   Orientation: posterior   Incision Type:    Closure Method:    Additional Comments:    Removal Indication and Assessment:    Wound Outcome:    Removal Indications:    Wound Image   11/28/23 1504   Dressing Appearance Intact;Saturated 11/28/23 1504   Drainage Amount Large 11/28/23 1504   Drainage Characteristics/Odor Serosanguineous 11/28/23 1504   Appearance Pink;Red;White;Tan;Granulating 11/28/23 1504   Periwound Area Scar tissue 11/28/23 1504   Wound Edges Defined 11/28/23 1504   Wound Length (cm) 9.5 cm 11/28/23 1504   Wound Width (cm) 12.4 cm 11/28/23 1504   Wound Depth (cm) 0.4 cm 11/28/23 1504   Wound Volume (cm^3) 47.12 cm^3 11/28/23 1504   Wound Surface Area (cm^2) 117.8 cm^2 11/28/23 1504   Undermining (depth (cm)/location) 11-2o'clock; deepest @ 1:00 -1.8 11/28/23 1504   Care Cleansed with:;Antimicrobial agent 11/28/23 1504   Dressing Applied 11/28/23 1504   Periwound Care Skin barrier film applied 11/28/23 1504            Incision/Site 08/16/23 2011 Left Hip lateral (Active)   08/16/23 2011   Present Prior to Hospital Arrival?:    Side: Left   Location: Hip   Orientation: lateral   Incision Type:    Closure Method:    Additional Comments:    Removal Indication and Assessment:    Wound Outcome:    Removal Indications:    Wound Image   11/28/23 1503    Dressing Appearance Intact;Moist drainage 11/28/23 1504   Drainage Amount Moderate 11/28/23 1504   Drainage Characteristics/Odor Serosanguineous 11/28/23 1504   Appearance Pink;Red;Granulating 11/28/23 1504   Periwound Area Intact;Scar tissue 11/28/23 1504   Wound Edges Defined 11/28/23 1504   Wound Length (cm) 6.3 cm 11/28/23 1504   Wound Width (cm) 3.7 cm 11/28/23 1504   Wound Depth (cm) 1.2 cm 11/28/23 1504   Wound Volume (cm^3) 27.972 cm^3 11/28/23 1504   Wound Surface Area (cm^2) 23.31 cm^2 11/28/23 1504   Undermining (depth (cm)/location) 360 tunneling; 2.8 @ 3:00 11/28/23 1504   Care Cleansed with:;Antimicrobial agent 11/28/23 1504   Dressing Applied 11/28/23 1504   Periwound Care Skin barrier film applied 11/28/23 1504         Assessment:     Wound of right heel is almost completely closed.  We will continue current therapy.  Dorsum of left foot and posterior ankle have small amount of eschar still present after debriding last week.  We will continue with Santyl and topical antibiotic.  Right hip wound is well granulated without slough or drainage.  We will continue with topical antibiotic powder and Santyl.  Sacral spine wound has some fibrous tissue.  We will continue Santyl and topical powder.  Left hip wound is much smaller and tissue is well granulated.  We will continue collagen at this time.  SK P will be treating the right ischium and left hip open wounds.  This will be evaluated by them tomorrow to start skin substitute.  Patient 1 week for MD visit    ICD-10-CM ICD-9-CM   1. Pressure injury of right ischium, stage 4  L89.314 707.05     707.24   2. Sacral decubitus ulcer, stage II  L89.152 707.03     707.22         Plan:   Tissue pathology and/or culture taken:  [] Yes [x] No   Sharp debridement performed:   [] Yes [x] No   Labs ordered this visit:   [] Yes [x] No   Imaging ordered this visit:   [] Yes [x] No           Orders Placed This Encounter   Procedures    Change dressing     Sacrum,  R  posterior thigh    Cleanse with Vashe - pat dry, apply skin prep to periwound, allow to dry, apply Santyl to areas of non viable tissue,  topical antibiotic compound to open areas,  cover with gauze, abd pad, medfix tape   Change daily and prn soilage     L hip - Cleanse with wound cleanser- apply promogran to wound base (as if lining like a coffee filter) place fluffed gauze in the center, cover with 4x4 gauze, abd, tape.   Change daily     Right heel- cleanse with wound cleanser, paint areas with betadine, allow to dry, cover with gauze, abd, wrap lightly with kerlix, secure with tape, change every other day     left achilles/left anterior foot: cleanse with wound cleanser, Apply Santyl, mesalt, cover with gauze, abd, wrap lightly with kerlix, secure with tape, change daily      MD recommends, delaying evaluation for skin substitute due to high suspicion of infection to sacral region and deterioration of wound bed.         Continue to offload, turn q2hrs, moisture management        Follow up in about 1 week (around 12/5/2023).

## 2023-12-01 ENCOUNTER — TELEPHONE (OUTPATIENT)
Dept: WOUND CARE | Facility: HOSPITAL | Age: 58
End: 2023-12-01
Payer: MEDICARE

## 2023-12-02 ENCOUNTER — LAB REQUISITION (OUTPATIENT)
Dept: LAB | Facility: HOSPITAL | Age: 58
End: 2023-12-02
Attending: PEDIATRICS
Payer: MEDICARE

## 2023-12-02 DIAGNOSIS — L89.314 PRESSURE ULCER OF RIGHT BUTTOCK, STAGE 4: ICD-10-CM

## 2023-12-02 DIAGNOSIS — L89.154 PRESSURE ULCER OF SACRAL REGION, STAGE 4: ICD-10-CM

## 2023-12-02 PROCEDURE — 87070 CULTURE OTHR SPECIMN AEROBIC: CPT | Performed by: PEDIATRICS

## 2023-12-04 NOTE — PATIENT INSTRUCTIONS
Sacrum,  R posterior thigh    Cleanse with Vashe - pat dry, apply skin prep to periwound, allow to dry, apply Santyl to areas of non viable tissue,  topical antibiotic compound to open areas,  cover with gauze, abd pad, medfix tape   Change daily and prn soilage           Right heel and L posterior ankle - cleanse with wound cleanser, paint areas with betadine, allow to dry, cover with gauze, abd, wrap lightly with kerlix, secure with tape, change every other day      Left anterior foot: cleanse with wound cleanser, Apply Santyl, mesalt, cover with gauze, abd, wrap lightly with kerlix, secure with tape, change daily      No wound care by wound care center to Left hip    Culture of R posterior thigh is negative.  MD has given ok for R posterior thigh to be grafted.          Continue to offload, turn q2hrs, moisture management

## 2023-12-05 ENCOUNTER — HOSPITAL ENCOUNTER (OUTPATIENT)
Dept: WOUND CARE | Facility: HOSPITAL | Age: 58
Discharge: HOME | End: 2023-12-05
Attending: STUDENT IN AN ORGANIZED HEALTH CARE EDUCATION/TRAINING PROGRAM
Payer: MEDICARE

## 2023-12-05 VITALS
RESPIRATION RATE: 18 BRPM | TEMPERATURE: 98 F | SYSTOLIC BLOOD PRESSURE: 91 MMHG | OXYGEN SATURATION: 99 % | HEART RATE: 83 BPM | DIASTOLIC BLOOD PRESSURE: 64 MMHG

## 2023-12-05 DIAGNOSIS — L89.150 UNSTAGEABLE PRESSURE ULCER OF SACRAL REGION: ICD-10-CM

## 2023-12-05 DIAGNOSIS — L89.152 SACRAL DECUBITUS ULCER, STAGE II: ICD-10-CM

## 2023-12-05 DIAGNOSIS — S71.002S OPEN WOUND OF LEFT HIP, SEQUELA: ICD-10-CM

## 2023-12-05 DIAGNOSIS — L89.314 PRESSURE INJURY OF RIGHT ISCHIUM, STAGE 4: ICD-10-CM

## 2023-12-05 DIAGNOSIS — L89.613 PRESSURE INJURY OF RIGHT HEEL, STAGE 3: ICD-10-CM

## 2023-12-05 DIAGNOSIS — L89.520 PRESSURE INJURY OF LEFT ANKLE, UNSTAGEABLE: Primary | ICD-10-CM

## 2023-12-05 LAB — BACTERIA WND CULT: NO GROWTH

## 2023-12-05 PROCEDURE — 11046 DBRDMT MUSC&/FSCA EA ADDL: CPT

## 2023-12-05 PROCEDURE — 99214 OFFICE O/P EST MOD 30 MIN: CPT

## 2023-12-05 PROCEDURE — 99213 PR OFFICE/OUTPT VISIT, EST, LEVL III, 20-29 MIN: ICD-10-PCS | Mod: 25,,, | Performed by: PEDIATRICS

## 2023-12-05 PROCEDURE — 11043 DBRDMT MUSC&/FSCA 1ST 20/<: CPT

## 2023-12-05 PROCEDURE — 11046 DBRDMT MUSC&/FSCA EA ADDL: CPT | Mod: ,,, | Performed by: PEDIATRICS

## 2023-12-05 PROCEDURE — 11043 DEBRIDEMENT: ICD-10-PCS | Mod: ,,, | Performed by: PEDIATRICS

## 2023-12-05 PROCEDURE — 11046 DEBRIDEMENT: ICD-10-PCS | Mod: ,,, | Performed by: PEDIATRICS

## 2023-12-05 PROCEDURE — 27000999 HC MEDICAL RECORD PHOTO DOCUMENTATION

## 2023-12-05 PROCEDURE — 99213 OFFICE O/P EST LOW 20 MIN: CPT | Mod: 25,,, | Performed by: PEDIATRICS

## 2023-12-05 PROCEDURE — 11043 DBRDMT MUSC&/FSCA 1ST 20/<: CPT | Mod: ,,, | Performed by: PEDIATRICS

## 2023-12-05 NOTE — PROGRESS NOTES
Subjective:       Patient ID: Antonio Galvan II is a 58 y.o. male.    Chief Complaint: Pressure Ulcer (Here for evaluation and treatment of bilateral foot ulcerations, R posterior thigh and sacral ulcerations. )    HPI  Review of Systems      Objective:      Temp:  [98.2 °F (36.8 °C)]   Pulse:  [83]   Resp:  [18]   BP: (91)/(64)   SpO2:  [99 %]   Physical Exam       Altered Skin Integrity 05/06/22 1140 Right Heel  Full thickness tissue loss. Subcutaneous fat may be visible but bone, tendon or muscle are not exposed (Active)   05/06/22 1140   Altered Skin Integrity Present on Admission - Did Patient arrive to the hospital with altered skin?: yes   Side: Right   Orientation:    Location: Heel   Wound Number:    Is this injury device related?: No   Primary Wound Type:    Description of Altered Skin Integrity: Full thickness tissue loss. Subcutaneous fat may be visible but bone, tendon or muscle are not exposed   Ankle-Brachial Index:    Pulses:    Removal Indication and Assessment:    Wound Outcome:    (Retired) Wound Length (cm):    (Retired) Wound Width (cm):    (Retired) Depth (cm):    Wound Description (Comments):    Removal Indications:    Wound Image   12/05/23 1532   Dressing Appearance Intact 12/05/23 1532   Drainage Amount Scant 12/05/23 1532   Drainage Characteristics/Odor Sanguineous 12/05/23 1532   Appearance Pink;Not granulating 12/05/23 1532   Periwound Area Scar tissue 12/05/23 1532   Wound Edges Irregular 12/05/23 1532   Wound Length (cm) 2.5 cm 12/05/23 1532   Wound Width (cm) 1.5 cm 12/05/23 1532   Wound Depth (cm) 0.1 cm 12/05/23 1532   Wound Volume (cm^3) 0.375 cm^3 12/05/23 1532   Wound Surface Area (cm^2) 3.75 cm^2 12/05/23 1532   Care Cleansed with:;Antimicrobial agent;Applied:;Povidone iodine 12/05/23 1532   Dressing Applied;Gauze;Absorptive Pad;Rolled gauze 12/05/23 1532   Periwound Care Dry periwound area maintained 12/05/23 1532            Altered Skin Integrity 01/12/23 1530 Sacral  spine Full thickness tissue loss with exposed bone, tendon, or muscle. Often includes undermining and tunneling. May extend into muscle and/or supporting structures. (Active)   01/12/23 1530   Altered Skin Integrity Present on Admission - Did Patient arrive to the hospital with altered skin?: yes   Side:    Orientation:    Location: Sacral spine   Wound Number:    Is this injury device related?:    Primary Wound Type:    Description of Altered Skin Integrity: Full thickness tissue loss with exposed bone, tendon, or muscle. Often includes undermining and tunneling. May extend into muscle and/or supporting structures.   Ankle-Brachial Index:    Pulses:    Removal Indication and Assessment:    Wound Outcome:    (Retired) Wound Length (cm):    (Retired) Wound Width (cm):    (Retired) Depth (cm):    Wound Description (Comments):    Removal Indications:    Wound Image    12/05/23 1532   Dressing Appearance Intact;Moist drainage 12/05/23 1532   Drainage Amount Large 12/05/23 1532   Drainage Characteristics/Odor Serosanguineous;Yellow;No odor;Bleeding controlled 12/05/23 1532   Appearance Red;Maroon;Purple;Black;Tan;Not granulating;White;Fibrin;Necrotic;Other (see comments) 12/05/23 1532   Tissue loss description Full thickness 12/05/23 1532   Black (%), Wound Tissue Color 65 % 12/05/23 1532   Red (%), Wound Tissue Color 20 % 12/05/23 1532   Yellow (%), Wound Tissue Color 15 % 12/05/23 1532   Periwound Area Scar tissue;Hematoma;Macerated 12/05/23 1532   Wound Edges Irregular 12/05/23 1532   Wound Length (cm) 9 cm 12/05/23 1532   Wound Width (cm) 8.5 cm 12/05/23 1532   Wound Depth (cm) 1.5 cm 12/05/23 1532   Wound Volume (cm^3) 114.75 cm^3 12/05/23 1532   Wound Surface Area (cm^2) 76.5 cm^2 12/05/23 1532   Care Antimicrobial agent;Debrided 12/05/23 1532   Dressing Applied;Sodium chloride impregnated;Gauze;Absorptive Pad 12/05/23 1532   Periwound Care Skin barrier film applied 12/05/23 1532            Altered Skin Integrity  08/01/23 1534 Left posterior Ankle Full thickness tissue loss. Base is covered by slough and/or eschar in the wound bed (Active)   08/01/23 1534   Altered Skin Integrity Present on Admission - Did Patient arrive to the hospital with altered skin?: yes   Side: Left   Orientation: posterior   Location: Ankle   Wound Number:    Is this injury device related?:    Primary Wound Type:    Description of Altered Skin Integrity: Full thickness tissue loss. Base is covered by slough and/or eschar in the wound bed   Ankle-Brachial Index:    Pulses: 2 + palpable, strong doppler signal per md   Removal Indication and Assessment:    Wound Outcome:    (Retired) Wound Length (cm):    (Retired) Wound Width (cm):    (Retired) Depth (cm):    Wound Description (Comments):    Removal Indications:    Wound Image   12/05/23 1532   Dressing Appearance Intact;Dry;Clean 12/05/23 1532   Drainage Amount None 12/05/23 1532   Appearance Red;Maroon;Tan;Purple;Closed/resurfaced 12/05/23 1532   Periwound Area Dry 12/05/23 1532   Wound Length (cm) 0 cm 12/05/23 1532   Wound Width (cm) 0 cm 12/05/23 1532   Wound Depth (cm) 0 cm 12/05/23 1532   Wound Volume (cm^3) 0 cm^3 12/05/23 1532   Wound Surface Area (cm^2) 0 cm^2 12/05/23 1532   Dressing Applied;Gauze;Absorptive Pad;Rolled gauze 12/05/23 1532   Periwound Care Dry periwound area maintained 12/05/23 1532            Altered Skin Integrity 08/01/23 1535 Left anterior Ankle Other (comment) Full thickness tissue loss. Base is covered by slough and/or eschar in the wound bed (Active)   08/01/23 1535   Altered Skin Integrity Present on Admission - Did Patient arrive to the hospital with altered skin?: yes   Side: Left   Orientation: anterior   Location: Ankle   Wound Number:    Is this injury device related?: No   Primary Wound Type: Other   Description of Altered Skin Integrity: Full thickness tissue loss. Base is covered by slough and/or eschar in the wound bed   Ankle-Brachial Index:    Pulses: 2+  palpable and strong doppler pulses per MD   Removal Indication and Assessment:    Wound Outcome:    (Retired) Wound Length (cm):    (Retired) Wound Width (cm):    (Retired) Depth (cm):    Wound Description (Comments):    Removal Indications:    Wound Image   12/05/23 1532   Dressing Appearance Intact;Moist drainage 12/05/23 1532   Drainage Amount None 12/05/23 1532   Drainage Characteristics/Odor Creamy;Tan;No odor;Bleeding controlled 12/05/23 1532   Appearance Pink;Red;Granulating;Tan;Slough;Eschar 12/05/23 1532   Tissue loss description Full thickness 12/05/23 1532   Periwound Area Scar tissue;Ecchymotic;Dry 12/05/23 1532   Wound Edges Defined 12/05/23 1532   Wound Length (cm) 1.7 cm 12/05/23 1532   Wound Width (cm) 1.4 cm 12/05/23 1532   Wound Depth (cm) 0.1 cm 12/05/23 1532   Wound Volume (cm^3) 0.238 cm^3 12/05/23 1532   Wound Surface Area (cm^2) 2.38 cm^2 12/05/23 1532   Care Cleansed with:;Sterile normal saline 12/05/23 1532   Dressing Applied;Sodium chloride impregnated;Island/border;Absorptive Pad;Rolled gauze 12/05/23 1532   Periwound Care Dry periwound area maintained 12/05/23 1532            Incision/Site 08/16/23 2011 Right Hip posterior (Active)   08/16/23 2011   Present Prior to Hospital Arrival?:    Side: Right   Location: Hip   Orientation: posterior   Incision Type:    Closure Method:    Additional Comments:    Removal Indication and Assessment:    Wound Outcome:    Removal Indications:    Wound Image   12/05/23 1532   Dressing Appearance Intact;Moist drainage 12/05/23 1532   Drainage Amount Large 12/05/23 1532   Drainage Characteristics/Odor Yellow;Tan;No odor;Bleeding controlled 12/05/23 1532   Appearance Red;Granulating;Tan 12/05/23 1532   Red (%), Wound Tissue Color 95 % 12/05/23 1532   Periwound Area Scar tissue 12/05/23 1532   Wound Edges Defined 12/05/23 1532   Wound Length (cm) 9.5 cm 12/05/23 1532   Wound Width (cm) 13 cm 12/05/23 1532   Wound Depth (cm) 0.5 cm 12/05/23 1532   Wound Volume  (cm^3) 61.75 cm^3 12/05/23 1532   Wound Surface Area (cm^2) 123.5 cm^2 12/05/23 1532   Care Cleansed with:;Antimicrobial agent 12/05/23 1532         Assessment:     Today left foot dorsal wound still has a certain amount of eschar and this was scored with a number 15 blade.  Mesalt was applied.  Posterior heel left is completely healed.  Right hip occasional fibrous tissue.  And red and granulating.  No evidence of infection.  No drainage.  This was cleaned and antimicrobial powder was applied with Santyl.  Left hip was not examined because of skin substitute in place.  Sacral wound today is necrotic and fibrous with some areas of hematoma.  This is 9 cm by 8.5 cm with a depth of 1.5 cm because of the necrosis a selective debridement was done.  See procedure note.  Area was cleaned and antibiotic powder and Santyl was applied.  Patient is currently on Ciprofloxacin and we are awaiting cultures.  Dressings will be changed daily.  Right hip wound is cleared to have skin grafting done.  Patient will return in 1 week for MD visit.    ICD-10-CM ICD-9-CM   1. Pressure injury of left ankle, unstageable  L89.520 707.06     707.25   2. Pressure injury of right ischium, stage 4  L89.314 707.05     707.24   3. Sacral decubitus ulcer, stage II  L89.152 707.03     707.22   4. Open wound of left hip, sequela  S71.002S 906.1   5. Pressure injury of right heel, stage 3  L89.613 707.07     707.23   6. Unstageable pressure ulcer of sacral region  L89.150 707.03     707.25         Plan:   Tissue pathology and/or culture taken:  [] Yes [x] No   Sharp debridement performed:   [x] Yes [] No   Labs ordered this visit:   [] Yes [x] No   Imaging ordered this visit:   [] Yes [x] No           Orders Placed This Encounter   Procedures    Change dressing     Sacrum,  R posterior thigh    Cleanse with Vashe - pat dry, apply skin prep to periwound, allow to dry, apply Santyl to areas of non viable tissue,  topical antibiotic compound to open  areas,  cover with gauze, abd pad, medfix tape   Change daily and prn soilage           Right heel and L posterior ankle - cleanse with wound cleanser, paint areas with betadine, allow to dry, cover with gauze, abd, wrap lightly with kerlix, secure with tape, change every other day      Left anterior foot: cleanse with wound cleanser, Apply Santyl, mesalt, cover with gauze, abd, wrap lightly with kerlix, secure with tape, change daily      No wound care by wound care center to Left hip    Culture of R posterior thigh is negative.  MD has given ok for R posterior thigh to be grafted.          Continue to offload, turn q2hrs, moisture management        Follow up in about 1 week (around 12/12/2023) for md visit .

## 2023-12-06 LAB
BACTERIA WND CULT: ABNORMAL
BACTERIA WND CULT: ABNORMAL

## 2023-12-08 RX ORDER — SULFAMETHOXAZOLE AND TRIMETHOPRIM 800; 160 MG/1; MG/1
1 TABLET ORAL 2 TIMES DAILY
Qty: 20 TABLET | Refills: 0 | Status: SHIPPED | OUTPATIENT
Start: 2023-12-08 | End: 2023-12-18

## 2023-12-08 NOTE — PROGRESS NOTES
Dr. Jean notified of culture results.  Pt currently on Cipro, not effective.   New telephone order to d/c cipro, start Bactrim DS 1 tab po BID x 10 days.  Sent electronically to pt's pharmacy.    Topical abx compound previously ordered and pt will use it once obtained from [facility].  Notified pt per telephone of new regimen.   Verbalized understanding.

## 2023-12-12 ENCOUNTER — HOSPITAL ENCOUNTER (OUTPATIENT)
Dept: WOUND CARE | Facility: HOSPITAL | Age: 58
Discharge: HOME | End: 2023-12-12
Attending: STUDENT IN AN ORGANIZED HEALTH CARE EDUCATION/TRAINING PROGRAM
Payer: MEDICARE

## 2023-12-12 DIAGNOSIS — L89.523 PRESSURE INJURY OF LEFT ANKLE, STAGE 3: ICD-10-CM

## 2023-12-12 DIAGNOSIS — L89.154 PRESSURE INJURY OF SACRAL REGION, STAGE 4: ICD-10-CM

## 2023-12-12 DIAGNOSIS — L89.613 PRESSURE INJURY OF RIGHT HEEL, STAGE 3: Primary | ICD-10-CM

## 2023-12-12 PROCEDURE — 11046 DBRDMT MUSC&/FSCA EA ADDL: CPT | Mod: ,,, | Performed by: PEDIATRICS

## 2023-12-12 PROCEDURE — 11043 DBRDMT MUSC&/FSCA 1ST 20/<: CPT | Mod: ,,, | Performed by: PEDIATRICS

## 2023-12-12 PROCEDURE — 99213 OFFICE O/P EST LOW 20 MIN: CPT | Mod: 25,,, | Performed by: PEDIATRICS

## 2023-12-12 PROCEDURE — 11046 DBRDMT MUSC&/FSCA EA ADDL: CPT

## 2023-12-12 PROCEDURE — 11046 DEBRIDEMENT: ICD-10-PCS | Mod: ,,, | Performed by: PEDIATRICS

## 2023-12-12 PROCEDURE — 99213 PR OFFICE/OUTPT VISIT, EST, LEVL III, 20-29 MIN: ICD-10-PCS | Mod: 25,,, | Performed by: PEDIATRICS

## 2023-12-12 PROCEDURE — 99214 OFFICE O/P EST MOD 30 MIN: CPT

## 2023-12-12 PROCEDURE — 11043 DBRDMT MUSC&/FSCA 1ST 20/<: CPT

## 2023-12-12 PROCEDURE — 27000999 HC MEDICAL RECORD PHOTO DOCUMENTATION

## 2023-12-12 PROCEDURE — 11043 DEBRIDEMENT: ICD-10-PCS | Mod: ,,, | Performed by: PEDIATRICS

## 2023-12-12 RX ORDER — PREDNISONE 20 MG/1
20 TABLET ORAL 3 TIMES DAILY
Qty: 15 TABLET | Refills: 0 | Status: SHIPPED | OUTPATIENT
Start: 2023-12-12 | End: 2023-12-17

## 2023-12-12 NOTE — PROGRESS NOTES
Subjective:       Patient ID: Antonio Galvan II is a 58 y.o. male.    Chief Complaint: Pressure Ulcer (L and R foot / ankle ulcerations, sacral ulceration.   Follow up with md for re-evaluation and treatment/)    HPI  Review of Systems      Objective:         Physical Exam       Altered Skin Integrity 05/06/22 1140 Right Heel  Full thickness tissue loss. Subcutaneous fat may be visible but bone, tendon or muscle are not exposed (Active)   05/06/22 1140   Altered Skin Integrity Present on Admission - Did Patient arrive to the hospital with altered skin?: yes   Side: Right   Orientation:    Location: Heel   Wound Number:    Is this injury device related?: No   Primary Wound Type:    Description of Altered Skin Integrity: Full thickness tissue loss. Subcutaneous fat may be visible but bone, tendon or muscle are not exposed   Ankle-Brachial Index:    Pulses:    Removal Indication and Assessment:    Wound Outcome:    (Retired) Wound Length (cm):    (Retired) Wound Width (cm):    (Retired) Depth (cm):    Wound Description (Comments):    Removal Indications:    Wound Image   12/12/23 1442   Dressing Appearance Intact;Dried drainage 12/12/23 1442   Drainage Amount Moderate 12/12/23 1442   Drainage Characteristics/Odor Serosanguineous 12/12/23 1442   Appearance Red;Granulating 12/12/23 1442   Tissue loss description Full thickness 12/12/23 1442   Red (%), Wound Tissue Color 100 % 12/12/23 1442   Periwound Area Scar tissue;Dry;Other (see comments) 12/12/23 1442   Wound Edges Irregular 12/12/23 1442   Wound Length (cm) 1.5 cm 12/12/23 1442   Wound Width (cm) 0.8 cm 12/12/23 1442   Wound Depth (cm) 0.1 cm 12/12/23 1442   Wound Volume (cm^3) 0.12 cm^3 12/12/23 1442   Wound Surface Area (cm^2) 1.2 cm^2 12/12/23 1442   Care Cleansed with:;Antimicrobial agent 12/12/23 1442   Dressing Applied;Sodium chloride impregnated;Gauze;Absorptive Pad;Rolled gauze 12/12/23 1442   Periwound Care Dry periwound area maintained 12/12/23 1442             Altered Skin Integrity 01/12/23 1530 Sacral spine Full thickness tissue loss with exposed bone, tendon, or muscle. Often includes undermining and tunneling. May extend into muscle and/or supporting structures. (Active)   01/12/23 1530   Altered Skin Integrity Present on Admission - Did Patient arrive to the hospital with altered skin?: yes   Side:    Orientation:    Location: Sacral spine   Wound Number:    Is this injury device related?:    Primary Wound Type:    Description of Altered Skin Integrity: Full thickness tissue loss with exposed bone, tendon, or muscle. Often includes undermining and tunneling. May extend into muscle and/or supporting structures.   Ankle-Brachial Index:    Pulses:    Removal Indication and Assessment:    Wound Outcome:    (Retired) Wound Length (cm):    (Retired) Wound Width (cm):    (Retired) Depth (cm):    Wound Description (Comments):    Removal Indications:    Wound Image    12/12/23 1442   Dressing Appearance Intact;Moist drainage 12/12/23 1442   Drainage Amount Moderate 12/12/23 1442   Drainage Characteristics/Odor Serosanguineous;No odor;Bleeding controlled 12/12/23 1442   Appearance Red;Granulating;Tan;Black;Gray;Eschar;Slough;Adipose;Muscle;White;Necrotic 12/12/23 1442   Tissue loss description Full thickness 12/12/23 1442   Black (%), Wound Tissue Color 60 % 12/12/23 1442   Red (%), Wound Tissue Color 30 % 12/12/23 1442   Yellow (%), Wound Tissue Color 10 % 12/12/23 1442   Periwound Area Scar tissue;Denuded 12/12/23 1442   Wound Edges Irregular 12/12/23 1442   Wound Length (cm) 8.4 cm 12/12/23 1442   Wound Width (cm) 7.5 cm 12/12/23 1442   Wound Depth (cm) 1.5 cm 12/12/23 1442   Wound Volume (cm^3) 94.5 cm^3 12/12/23 1442   Wound Surface Area (cm^2) 63 cm^2 12/12/23 1442   Care Cleansed with:;Antimicrobial agent;Debrided 12/12/23 1442   Dressing Applied;Other (comment);Gauze;Absorptive Pad 12/12/23 1442   Periwound Care Skin barrier film applied 12/12/23 1442             Altered Skin Integrity 08/01/23 1535 Left anterior Ankle Other (comment) Full thickness tissue loss. Base is covered by slough and/or eschar in the wound bed (Active)   08/01/23 1535   Altered Skin Integrity Present on Admission - Did Patient arrive to the hospital with altered skin?: yes   Side: Left   Orientation: anterior   Location: Ankle   Wound Number:    Is this injury device related?: No   Primary Wound Type: Other   Description of Altered Skin Integrity: Full thickness tissue loss. Base is covered by slough and/or eschar in the wound bed   Ankle-Brachial Index:    Pulses: 2+ palpable and strong doppler pulses per MD   Removal Indication and Assessment:    Wound Outcome:    (Retired) Wound Length (cm):    (Retired) Wound Width (cm):    (Retired) Depth (cm):    Wound Description (Comments):    Removal Indications:    Wound Image    12/12/23 1442   Dressing Appearance Intact;Moist drainage 12/12/23 1442   Drainage Amount Small 12/12/23 1442   Drainage Characteristics/Odor Ryan;Creamy 12/12/23 1442   Appearance Red;Granulating;Tan;Slough;Fibrin 12/12/23 1442   Tissue loss description Full thickness 12/12/23 1442   Periwound Area Scar tissue;Dry;Other (see comments) 12/12/23 1442   Wound Edges Defined 12/12/23 1442   Wound Length (cm) 1.4 cm 12/12/23 1442   Wound Width (cm) 1.5 cm 12/12/23 1442   Wound Depth (cm) 0.1 cm 12/12/23 1442   Wound Volume (cm^3) 0.21 cm^3 12/12/23 1442   Wound Surface Area (cm^2) 2.1 cm^2 12/12/23 1442   Care Cleansed with:;Antimicrobial agent 12/12/23 1442   Dressing Applied;Sodium chloride impregnated;Gauze;Absorptive Pad;Rolled gauze 12/12/23 1442   Periwound Care Dry periwound area maintained 12/12/23 1442         Assessment:     Today right heel has a slight opening.  This was cleaned and Mesalt was applied.  Dorsum of the foot is granulating and has improved.  This is also covered with Mesalt.  Left posterior ankle is closed at this time.  Left hip and right hip wounds not  changed but the bandages were reinforced by us today.  SK P will evaluate the wounds tomorrow and change dressings as necessary.  Sacral area had a debridement done because of large muscle necrosis.  See procedure note.  Area we will continue with Santyl and we are awaiting topical antibiotics.  Patient had a rash to his back which started after Bactrim have been given.  Because of the need of antibiotics prednisone will be started and patient will take Benadryl if rash gets worse.  Patient is to return in 1 week for MD visit.    ICD-10-CM ICD-9-CM   1. Pressure injury of right heel, stage 3  L89.613 707.07     707.23   2. Pressure injury of sacral region, stage 4  L89.154 707.03     707.24   3. Pressure injury of left ankle, stage 3  L89.523 707.06     707.23         Plan:   Tissue pathology and/or culture taken:  [] Yes [x] No   Sharp debridement performed:   [x] Yes [] No   Labs ordered this visit:   [] Yes [x] No   Imaging ordered this visit:   [] Yes [x] No           Orders Placed This Encounter   Procedures    Debridement     This order was created via procedure documentation     Standing Status:   Standing     Number of Occurrences:   1    Change dressing     Sacrum,- Cleanse with Vashe - pat dry, apply skin prep to periwound, allow to dry, apply Santyl to areas of non viable tissue,  topical antibiotic compound to open areas (Both Bactrim and Zosyn received from [facility] - follow instructions on Prescription received for application.    cover with gauze, abd pad, medfix tape   Change daily and prn soilage            R heel -  cleanse with wound cleanser, apply mesalt to open area, cover with gauze, abd, wrap lightly with kerlix, secure with tape, change daily      Left anterior foot: cleanse with wound cleanser, Apply Santyl, mesalt, cover with gauze, abd, wrap lightly with kerlix, secure with tape, change daily      No wound care by wound care center to Left hip or R posterior thigh, sites  are being grafted by [facility]     oral steriods and take as prescribed for rash, Take benadryl as indicated for rash.        Follow up in about 1 week (around 12/19/2023) for md visit.

## 2023-12-12 NOTE — PROCEDURES
"Debridement    Date/Time: 12/12/2023 2:09 PM    Performed by: Ronald Barcenas MD  Authorized by: Ronald Barcenas MD  Associated wounds:        Altered Skin Integrity 01/12/23 1530 Sacral spine Full thickness tissue loss with exposed bone, tendon, or muscle. Often includes undermining and tunneling. May extend into muscle and/or supporting structures.  Time out: Immediately prior to procedure a "time out" was called to verify the correct patient, procedure, equipment, support staff and site/side marked as required.    Consent Done?:  Yes (Written)    Preparation: Patient was prepped and draped with clean technique    Local anesthesia used?: No      Wound Details:    Location:  Sacrum    Type of Debridement:  Excisional       Length (cm):  8.4       Area (sq cm):  63       Width (cm):  7.5       Percent Debrided (%):  100       Depth (cm):  1.9       Total Area Debrided (sq cm):  63    Depth of debridement:  Muscle/fascia/tendon    Tissue debrided:  Muscle    Devitalized tissue debrided:  Necrotic/Eschar    Instruments:  Blade and Curette  Bleeding:  Moderate  Hemostasis Achieved: Yes  Method Used:  Pressure  Patient tolerance:  Patient tolerated the procedure well with no immediate complications    "

## 2023-12-12 NOTE — PATIENT INSTRUCTIONS
Sacrum,- Cleanse with Vashe - pat dry, apply skin prep to periwound, allow to dry, apply Santyl to areas of non viable tissue,  topical antibiotic compound to open areas (Both Bactrim and Zosyn received from [facility] pharmacy - follow instructions on Prescription received for application.    cover with gauze, abd pad, medfix tape   Change daily and prn soilage            R heel -  cleanse with wound cleanser, apply mesalt to open area, cover with gauze, abd, wrap lightly with kerlix, secure with tape, change daily      Left anterior foot: cleanse with wound cleanser, Apply Santyl, mesalt, cover with gauze, abd, wrap lightly with kerlix, secure with tape, change daily      No wound care by wound care center to Left hip or R posterior thigh, sites are being grafted by [facility]     oral steriods and take as prescribed for rash, Take benadryl as indicated for rash.

## 2023-12-19 ENCOUNTER — HOSPITAL ENCOUNTER (OUTPATIENT)
Dept: WOUND CARE | Facility: HOSPITAL | Age: 58
Discharge: HOME | End: 2023-12-19
Attending: STUDENT IN AN ORGANIZED HEALTH CARE EDUCATION/TRAINING PROGRAM
Payer: MEDICARE

## 2023-12-19 VITALS
TEMPERATURE: 98 F | OXYGEN SATURATION: 99 % | DIASTOLIC BLOOD PRESSURE: 63 MMHG | SYSTOLIC BLOOD PRESSURE: 90 MMHG | RESPIRATION RATE: 18 BRPM | HEART RATE: 105 BPM

## 2023-12-19 DIAGNOSIS — L89.523 PRESSURE INJURY OF LEFT ANKLE, STAGE 3: ICD-10-CM

## 2023-12-19 DIAGNOSIS — L89.613 PRESSURE INJURY OF RIGHT HEEL, STAGE 3: Primary | ICD-10-CM

## 2023-12-19 DIAGNOSIS — L89.154 PRESSURE INJURY OF SACRAL REGION, STAGE 4: ICD-10-CM

## 2023-12-19 PROCEDURE — 27000999 HC MEDICAL RECORD PHOTO DOCUMENTATION

## 2023-12-19 PROCEDURE — 99214 OFFICE O/P EST MOD 30 MIN: CPT

## 2023-12-19 PROCEDURE — 99213 PR OFFICE/OUTPT VISIT, EST, LEVL III, 20-29 MIN: ICD-10-PCS | Mod: ,,, | Performed by: PEDIATRICS

## 2023-12-19 PROCEDURE — 99213 OFFICE O/P EST LOW 20 MIN: CPT | Mod: ,,, | Performed by: PEDIATRICS

## 2023-12-19 NOTE — PATIENT INSTRUCTIONS
Sacrum,- Cleanse with Vashe - pat dry, apply skin prep to periwound, allow to dry, apply Santyl to areas of non viable tissue,  topical antibiotic compound to open areas (Both Bactrim and Zosyn received from [facility] - follow instructions on Prescription received for application.    cover with vashe moistened gauze, abd pad, medfix tape   Change daily and prn soilage            R heel -  cleanse with wound cleanser, apply puracol plus (collagen) to open area, cover with gauze, abd, wrap lightly with kerlix, secure with tape, change every other day     Left anterior foot: cleanse with wound cleanser, Apply Santyl, mesalt, cover with gauze, abd, wrap lightly with kerlix, secure with tape, change daily      No wound care by wound care center to Left hip or R posterior thigh, sites are being grafted by [facility]

## 2023-12-19 NOTE — PROGRESS NOTES
Subjective:       Patient ID: Antonio Galvan II is a 58 y.o. male.    Chief Complaint: Pressure Ulcer (L anterior ankle, R heel, Sacral pressure ulcers.   Follow up with md for re-evaluation and treatment)    HPI  Review of Systems      Objective:      Temp:  [97.6 °F (36.4 °C)]   Pulse:  [105]   Resp:  [18]   BP: (90)/(63)   SpO2:  [99 %]   Physical Exam       Altered Skin Integrity 05/06/22 1140 Right Heel  Full thickness tissue loss. Subcutaneous fat may be visible but bone, tendon or muscle are not exposed (Active)   05/06/22 1140   Altered Skin Integrity Present on Admission - Did Patient arrive to the hospital with altered skin?: yes   Side: Right   Orientation:    Location: Heel   Wound Number:    Is this injury device related?: No   Primary Wound Type:    Description of Altered Skin Integrity: Full thickness tissue loss. Subcutaneous fat may be visible but bone, tendon or muscle are not exposed   Ankle-Brachial Index:    Pulses:    Removal Indication and Assessment:    Wound Outcome:    (Retired) Wound Length (cm):    (Retired) Wound Width (cm):    (Retired) Depth (cm):    Wound Description (Comments):    Removal Indications:    Wound Image   12/19/23 1445   Dressing Appearance Intact;Dried drainage 12/19/23 1445   Drainage Amount Scant 12/19/23 1445   Drainage Characteristics/Odor Serosanguineous 12/19/23 1445   Appearance Red;Granulating 12/19/23 1445   Tissue loss description Full thickness 12/19/23 1445   Red (%), Wound Tissue Color 100 % 12/19/23 1445   Periwound Area Scar tissue;Dry 12/19/23 1445   Wound Edges Irregular 12/19/23 1445   Wound Length (cm) 3.5 cm 12/19/23 1445   Wound Width (cm) 2.5 cm 12/19/23 1445   Wound Depth (cm) 0.1 cm 12/19/23 1445   Wound Volume (cm^3) 0.875 cm^3 12/19/23 1445   Wound Surface Area (cm^2) 8.75 cm^2 12/19/23 1445   Care Cleansed with:;Antimicrobial agent;Sterile normal saline 12/19/23 1445   Dressing Applied;Collagen;Gauze;Absorptive Pad;Rolled gauze 12/19/23  1445   Periwound Care Dry periwound area maintained 12/19/23 1445            Altered Skin Integrity 01/12/23 1530 Sacral spine Full thickness tissue loss with exposed bone, tendon, or muscle. Often includes undermining and tunneling. May extend into muscle and/or supporting structures. (Active)   01/12/23 1530   Altered Skin Integrity Present on Admission - Did Patient arrive to the hospital with altered skin?: yes   Side:    Orientation:    Location: Sacral spine   Wound Number:    Is this injury device related?:    Primary Wound Type:    Description of Altered Skin Integrity: Full thickness tissue loss with exposed bone, tendon, or muscle. Often includes undermining and tunneling. May extend into muscle and/or supporting structures.   Ankle-Brachial Index:    Pulses:    Removal Indication and Assessment:    Wound Outcome:    (Retired) Wound Length (cm):    (Retired) Wound Width (cm):    (Retired) Depth (cm):    Wound Description (Comments):    Removal Indications:    Wound Image    12/19/23 1445   Dressing Appearance Intact;Moist drainage 12/19/23 1445   Drainage Amount Moderate 12/19/23 1445   Drainage Characteristics/Odor Serosanguineous 12/19/23 1445   Appearance Red;Granulating 12/19/23 1445   Tissue loss description Full thickness 12/19/23 1445   Black (%), Wound Tissue Color 10 % 12/19/23 1445   Red (%), Wound Tissue Color 60 % 12/19/23 1445   Yellow (%), Wound Tissue Color 30 % 12/19/23 1445   Periwound Area Scar tissue;Denuded 12/19/23 1445   Wound Edges Irregular 12/19/23 1445   Wound Length (cm) 8 cm 12/19/23 1445   Wound Width (cm) 6.5 cm 12/19/23 1445   Wound Depth (cm) 1.2 cm 12/19/23 1445   Wound Volume (cm^3) 62.4 cm^3 12/19/23 1445   Wound Surface Area (cm^2) 52 cm^2 12/19/23 1445   Care Cleansed with:;Antimicrobial agent 12/19/23 1445   Dressing Applied;Gauze;Absorptive Pad 12/19/23 1445   Periwound Care Skin barrier film applied 12/19/23 1445            Altered Skin Integrity 08/01/23 1535 Left  anterior Ankle Other (comment) Full thickness tissue loss. Base is covered by slough and/or eschar in the wound bed (Active)   08/01/23 1535   Altered Skin Integrity Present on Admission - Did Patient arrive to the hospital with altered skin?: yes   Side: Left   Orientation: anterior   Location: Ankle   Wound Number:    Is this injury device related?: No   Primary Wound Type: Other   Description of Altered Skin Integrity: Full thickness tissue loss. Base is covered by slough and/or eschar in the wound bed   Ankle-Brachial Index:    Pulses: 2+ palpable and strong doppler pulses per MD   Removal Indication and Assessment:    Wound Outcome:    (Retired) Wound Length (cm):    (Retired) Wound Width (cm):    (Retired) Depth (cm):    Wound Description (Comments):    Removal Indications:    Wound Image   12/19/23 1445   Dressing Appearance Intact;Moist drainage 12/19/23 1445   Drainage Amount Small 12/19/23 1445   Drainage Characteristics/Odor Ryan;Creamy 12/19/23 1445   Appearance Red;Granulating 12/19/23 1445   Tissue loss description Full thickness 12/19/23 1445   Black (%), Wound Tissue Color 100 % 12/19/23 1445   Periwound Area Scar tissue;Dry 12/19/23 1445   Wound Edges Defined 12/19/23 1445   Wound Length (cm) 1.3 cm 12/19/23 1445   Wound Width (cm) 1.1 cm 12/19/23 1445   Wound Depth (cm) 0.1 cm 12/19/23 1445   Wound Volume (cm^3) 0.143 cm^3 12/19/23 1445   Wound Surface Area (cm^2) 1.43 cm^2 12/19/23 1445   Care Cleansed with:;Antimicrobial agent;Sterile normal saline 12/19/23 1445   Dressing Applied;Sodium chloride impregnated;Gauze;Rolled gauze;Absorptive Pad 12/19/23 1445   Periwound Care Dry periwound area maintained 12/19/23 1445         Assessment:     Today right heel ulcer has improved.  Well granulated.  We will start collagen.  Left dorsum foot wound is well granulated and Santyl and antibiotic powder was used.  Spine wound is much improved.  Area was scoped with curette to allow Santyl and antibiotics to  absorb.  Santyl and antibiotics for applied.  Dressings will be changed daily and patient will return in 1 week for MD visit    ICD-10-CM ICD-9-CM   1. Pressure injury of right heel, stage 3  L89.613 707.07     707.23   2. Pressure injury of sacral region, stage 4  L89.154 707.03     707.24   3. Pressure injury of left ankle, stage 3  L89.523 707.06     707.23         Plan:   Tissue pathology and/or culture taken:  [] Yes [x] No   Sharp debridement performed:   [] Yes [x] No   Labs ordered this visit:   [] Yes [x] No   Imaging ordered this visit:   [] Yes [x] No           Orders Placed This Encounter   Procedures    Change dressing     Sacrum,- Cleanse with Vashe - pat dry, apply skin prep to periwound, allow to dry, apply Santyl to areas of non viable tissue,  topical antibiotic compound to open areas (Both Bactrim and Zosyn received from [facility] - follow instructions on Prescription received for application.    cover with vashe moistened gauze, abd pad, medfix tape   Change daily and prn soilage            R heel -  cleanse with wound cleanser, apply puracol plus (collagen) to open area, cover with gauze, abd, wrap lightly with kerlix, secure with tape, change every other day     Left anterior foot: cleanse with wound cleanser, Apply Santyl, mesalt, cover with gauze, abd, wrap lightly with kerlix, secure with tape, change daily      No wound care by wound care center to Left hip or R posterior thigh, sites are being grafted by [facility]           Follow up in about 1 week (around 12/26/2023) for md visit .

## 2023-12-26 ENCOUNTER — HOSPITAL ENCOUNTER (OUTPATIENT)
Dept: WOUND CARE | Facility: HOSPITAL | Age: 58
Discharge: HOME | End: 2023-12-26
Attending: STUDENT IN AN ORGANIZED HEALTH CARE EDUCATION/TRAINING PROGRAM
Payer: MEDICARE

## 2023-12-26 VITALS
DIASTOLIC BLOOD PRESSURE: 81 MMHG | RESPIRATION RATE: 18 BRPM | OXYGEN SATURATION: 95 % | HEART RATE: 70 BPM | SYSTOLIC BLOOD PRESSURE: 125 MMHG | TEMPERATURE: 98 F

## 2023-12-26 DIAGNOSIS — L89.613 PRESSURE INJURY OF RIGHT HEEL, STAGE 3: Primary | ICD-10-CM

## 2023-12-26 DIAGNOSIS — L89.523 PRESSURE INJURY OF LEFT ANKLE, STAGE 3: ICD-10-CM

## 2023-12-26 DIAGNOSIS — L89.154 PRESSURE INJURY OF SACRAL REGION, STAGE 4: ICD-10-CM

## 2023-12-26 PROCEDURE — 11043 DBRDMT MUSC&/FSCA 1ST 20/<: CPT | Mod: ,,, | Performed by: PEDIATRICS

## 2023-12-26 PROCEDURE — 97598 DBRDMT OPN WND ADDL 20CM/<: CPT

## 2023-12-26 PROCEDURE — 11043 DBRDMT MUSC&/FSCA 1ST 20/<: CPT

## 2023-12-26 PROCEDURE — 11046 DEBRIDEMENT: ICD-10-PCS | Mod: ,,, | Performed by: PEDIATRICS

## 2023-12-26 PROCEDURE — 99499 NO LOS: ICD-10-PCS | Mod: ,,, | Performed by: PEDIATRICS

## 2023-12-26 PROCEDURE — 99213 OFFICE O/P EST LOW 20 MIN: CPT

## 2023-12-26 PROCEDURE — 99499 UNLISTED E&M SERVICE: CPT | Mod: ,,, | Performed by: PEDIATRICS

## 2023-12-26 PROCEDURE — 11046 DBRDMT MUSC&/FSCA EA ADDL: CPT | Mod: ,,, | Performed by: PEDIATRICS

## 2023-12-26 PROCEDURE — 97597 DBRDMT OPN WND 1ST 20 CM/<: CPT

## 2023-12-26 PROCEDURE — 27000999 HC MEDICAL RECORD PHOTO DOCUMENTATION

## 2023-12-26 PROCEDURE — 11043 DEBRIDEMENT: ICD-10-PCS | Mod: ,,, | Performed by: PEDIATRICS

## 2023-12-26 PROCEDURE — 11046 DBRDMT MUSC&/FSCA EA ADDL: CPT

## 2023-12-26 NOTE — PROCEDURES
"Debridement    Date/Time: 12/26/2023 2:01 PM    Performed by: Ronald Barcenas MD  Authorized by: Ronald Barcenas MD  Associated wounds:        Altered Skin Integrity 01/12/23 1530 Sacral spine Full thickness tissue loss with exposed bone, tendon, or muscle. Often includes undermining and tunneling. May extend into muscle and/or supporting structures.  Time out: Immediately prior to procedure a "time out" was called to verify the correct patient, procedure, equipment, support staff and site/side marked as required.    Consent Done?:  Yes (Written)    Preparation: Patient was prepped and draped with clean technique    Local anesthesia used?: No      Wound Details:    Location:  Sacrum    Type of Debridement:  Non-excisional       Length (cm):  7.5       Area (sq cm):  60       Width (cm):  8       Percent Debrided (%):  100       Depth (cm):  0.6       Total Area Debrided (sq cm):  60    Depth of debridement:  Muscle/fascia/tendon    Tissue debrided:  Subcutaneous, Muscle and Fascia    Devitalized tissue debrided:  Necrotic/Eschar, Slough, Fibrin and Biofilm    Instruments:  Curette  Bleeding:  Moderate  Hemostasis Achieved: Yes  Method Used:  Pressure  Patient tolerance:  Patient tolerated the procedure well with no immediate complications    "

## 2023-12-26 NOTE — PROGRESS NOTES
Subjective:       Patient ID: Antonio Galvan II is a 58 y.o. male.    Chief Complaint: Pressure Ulcer (Multiple pressure ulcers.   Follow up with md for re-evaluation and treatment)    HPI  Review of Systems      Objective:      Temp:  [97.7 °F (36.5 °C)]   Pulse:  [70]   Resp:  [18]   BP: (125)/(81)   SpO2:  [95 %]   Physical Exam       Altered Skin Integrity 05/06/22 1140 Right Heel  Full thickness tissue loss. Subcutaneous fat may be visible but bone, tendon or muscle are not exposed (Active)   05/06/22 1140   Altered Skin Integrity Present on Admission - Did Patient arrive to the hospital with altered skin?: yes   Side: Right   Orientation:    Location: Heel   Wound Number:    Is this injury device related?: No   Primary Wound Type:    Description of Altered Skin Integrity: Full thickness tissue loss. Subcutaneous fat may be visible but bone, tendon or muscle are not exposed   Ankle-Brachial Index:    Pulses:    Removal Indication and Assessment:    Wound Outcome:    (Retired) Wound Length (cm):    (Retired) Wound Width (cm):    (Retired) Depth (cm):    Wound Description (Comments):    Removal Indications:    Wound Image   12/26/23 1503   Dressing Appearance Intact;Dried drainage 12/26/23 1503   Drainage Amount Small 12/26/23 1503   Drainage Characteristics/Odor Serosanguineous;No odor;Bleeding controlled 12/26/23 1503   Appearance Red;Granulating 12/26/23 1503   Red (%), Wound Tissue Color 100 % 12/26/23 1503   Periwound Area Scar tissue 12/26/23 1503   Wound Edges Irregular 12/26/23 1503   Wound Length (cm) 2 cm 12/26/23 1503   Wound Width (cm) 0.8 cm 12/26/23 1503   Wound Depth (cm) 0.1 cm 12/26/23 1503   Wound Volume (cm^3) 0.16 cm^3 12/26/23 1503   Wound Surface Area (cm^2) 1.6 cm^2 12/26/23 1503   Care Cleansed with:;Sterile normal saline 12/26/23 1503   Dressing Applied;Collagen;Silver;Gauze;Absorptive Pad;Rolled gauze 12/26/23 1503            Altered Skin Integrity 01/12/23 1530 Sacral spine Full  thickness tissue loss with exposed bone, tendon, or muscle. Often includes undermining and tunneling. May extend into muscle and/or supporting structures. (Active)   01/12/23 1530   Altered Skin Integrity Present on Admission - Did Patient arrive to the hospital with altered skin?: yes   Side:    Orientation:    Location: Sacral spine   Wound Number:    Is this injury device related?:    Primary Wound Type:    Description of Altered Skin Integrity: Full thickness tissue loss with exposed bone, tendon, or muscle. Often includes undermining and tunneling. May extend into muscle and/or supporting structures.   Ankle-Brachial Index:    Pulses:    Removal Indication and Assessment:    Wound Outcome:    (Retired) Wound Length (cm):    (Retired) Wound Width (cm):    (Retired) Depth (cm):    Wound Description (Comments):    Removal Indications:    Wound Image   12/26/23 1503   Dressing Appearance Intact;Moist drainage 12/26/23 1503   Drainage Amount Moderate 12/26/23 1503   Drainage Characteristics/Odor Yellow;Sanguineous 12/26/23 1503   Appearance Red;Granulating;Tan;Purple;Necrotic 12/26/23 1503   Tissue loss description Full thickness 12/26/23 1503   Black (%), Wound Tissue Color 8 % 12/26/23 1503   Red (%), Wound Tissue Color 30 % 12/26/23 1503   Yellow (%), Wound Tissue Color 70 % 12/26/23 1503   Periwound Area Scar tissue;Moist 12/26/23 1503   Wound Edges Irregular 12/26/23 1503   Wound Length (cm) 8 cm 12/26/23 1503   Wound Width (cm) 7.5 cm 12/26/23 1503   Wound Depth (cm) 1.5 cm 12/26/23 1503   Wound Volume (cm^3) 90 cm^3 12/26/23 1503   Wound Surface Area (cm^2) 60 cm^2 12/26/23 1503   Care Cleansed with:;Antimicrobial agent;Debrided 12/26/23 1503   Dressing Applied;Gauze, wet to dry;Absorptive Pad 12/26/23 1503   Periwound Care Skin barrier film applied 12/26/23 1503            Altered Skin Integrity 08/01/23 1535 Left anterior Ankle Other (comment) Full thickness tissue loss. Base is covered by slough and/or  eschar in the wound bed (Active)   08/01/23 1535   Altered Skin Integrity Present on Admission - Did Patient arrive to the hospital with altered skin?: yes   Side: Left   Orientation: anterior   Location: Ankle   Wound Number:    Is this injury device related?: No   Primary Wound Type: Other   Description of Altered Skin Integrity: Full thickness tissue loss. Base is covered by slough and/or eschar in the wound bed   Ankle-Brachial Index:    Pulses: 2+ palpable and strong doppler pulses per MD   Removal Indication and Assessment:    Wound Outcome:    (Retired) Wound Length (cm):    (Retired) Wound Width (cm):    (Retired) Depth (cm):    Wound Description (Comments):    Removal Indications:    Wound Image   12/26/23 1503   Dressing Appearance Intact;Moist drainage 12/26/23 1503   Drainage Amount Scant 12/26/23 1503   Drainage Characteristics/Odor Ryan 12/26/23 1503   Appearance Red;Granulating;Tan;Fibrin 12/26/23 1503   Tissue loss description Full thickness 12/26/23 1503   Periwound Area Scar tissue;Maroon;Purple 12/26/23 1503   Wound Edges Callused 12/26/23 1503   Wound Length (cm) 1.3 cm 12/26/23 1503   Wound Width (cm) 1 cm 12/26/23 1503   Wound Depth (cm) 0.1 cm 12/26/23 1503   Wound Volume (cm^3) 0.13 cm^3 12/26/23 1503   Wound Surface Area (cm^2) 1.3 cm^2 12/26/23 1503   Care Cleansed with: 12/26/23 1503   Dressing Applied;Sodium chloride impregnated;Gauze;Rolled gauze 12/26/23 1503   Periwound Care Dry periwound area maintained 12/26/23 1503         Assessment:     Right heel has improved wound.  We will put collagen to this.  Left dorsum foot well granulated and getting smaller.  Santyl and Mesalt was applied and bandaged.  Sacrum is 7.5 cm by 8 cm.  This has large amounts of necrotic tissue.  A selective debridement was done.  See procedure note.  Santyl and topical antibiotic compound was applied.  And this was covered with Vashe moistened gauze.  Dressings will be changed daily.  Patient will return in 1  week for MD visit.    ICD-10-CM ICD-9-CM   1. Pressure injury of right heel, stage 3  L89.613 707.07     707.23   2. Pressure injury of sacral region, stage 4  L89.154 707.03     707.24   3. Pressure injury of left ankle, stage 3  L89.523 707.06     707.23         Plan:   Tissue pathology and/or culture taken:  [] Yes [x] No   Sharp debridement performed:   [x] Yes [] No   Labs ordered this visit:   [] Yes [x] No   Imaging ordered this visit:   [] Yes [x] No           Orders Placed This Encounter   Procedures    Change dressing     Sacrum,- Cleanse with Vashe - pat dry, apply skin prep to periwound, allow to dry, apply Santyl to areas of non viable tissue,  topical antibiotic compound to open areas (Both Bactrim and Zosyn received from [facility] - follow instructions on Prescription received for application.    cover with vashe moistened gauze, abd pad, medfix tape   Change daily and prn soilage            R heel -  cleanse with wound cleanser, apply puracol plus (collagen) to open area, cover with gauze, abd, wrap lightly with kerlix, secure with tape, change every other day     Left anterior foot: cleanse with wound cleanser, Apply Santyl, mesalt, cover with gauze, abd, wrap lightly with kerlix, secure with tape, change daily      No wound care by wound care center to Left hip or R posterior thigh, sites are being grafted by [facility]        Follow up in about 1 week (around 1/2/2024) for md visit .

## 2023-12-26 NOTE — PATIENT INSTRUCTIONS
Sacrum,- Cleanse with Vashe - pat dry, apply skin prep to periwound, allow to dry, apply Santyl to areas of non viable tissue,  topical antibiotic compound to open areas (Both Bactrim and Zosyn received from [facility] - follow instructions on Prescription received for application.    cover with vashe moistened gauze, abd pad, medfix tape   Change daily and prn soilage            R heel -  cleanse with wound cleanser, apply puracol plus (collagen) to open area, cover with gauze, abd, wrap lightly with kerlix, secure with tape, change every other day     Left anterior foot: cleanse with wound cleanser, Apply Santyl, mesalt, cover with gauze, abd, wrap lightly with kerlix, secure with tape, change daily      No wound care by wound care center to Left hip or R posterior thigh, sites are being grafted by [facility]

## 2024-01-02 ENCOUNTER — HOSPITAL ENCOUNTER (OUTPATIENT)
Dept: WOUND CARE | Facility: HOSPITAL | Age: 59
Discharge: HOME | End: 2024-01-02
Attending: STUDENT IN AN ORGANIZED HEALTH CARE EDUCATION/TRAINING PROGRAM
Payer: MEDICARE

## 2024-01-02 VITALS
OXYGEN SATURATION: 100 % | SYSTOLIC BLOOD PRESSURE: 90 MMHG | RESPIRATION RATE: 16 BRPM | HEART RATE: 130 BPM | DIASTOLIC BLOOD PRESSURE: 69 MMHG | TEMPERATURE: 98 F

## 2024-01-02 DIAGNOSIS — L89.523 PRESSURE INJURY OF LEFT ANKLE, STAGE 3: ICD-10-CM

## 2024-01-02 DIAGNOSIS — L89.613 PRESSURE INJURY OF RIGHT HEEL, STAGE 3: Primary | ICD-10-CM

## 2024-01-02 DIAGNOSIS — L89.154 PRESSURE INJURY OF SACRAL REGION, STAGE 4: ICD-10-CM

## 2024-01-02 PROCEDURE — 11046 DBRDMT MUSC&/FSCA EA ADDL: CPT

## 2024-01-02 PROCEDURE — 27000999 HC MEDICAL RECORD PHOTO DOCUMENTATION

## 2024-01-02 PROCEDURE — 99214 OFFICE O/P EST MOD 30 MIN: CPT

## 2024-01-02 PROCEDURE — 99213 OFFICE O/P EST LOW 20 MIN: CPT | Mod: 25,,, | Performed by: PEDIATRICS

## 2024-01-02 PROCEDURE — 11043 DBRDMT MUSC&/FSCA 1ST 20/<: CPT

## 2024-01-02 PROCEDURE — 11043 DBRDMT MUSC&/FSCA 1ST 20/<: CPT | Mod: ,,, | Performed by: PEDIATRICS

## 2024-01-02 PROCEDURE — 11046 DBRDMT MUSC&/FSCA EA ADDL: CPT | Mod: ,,, | Performed by: PEDIATRICS

## 2024-01-02 NOTE — PATIENT INSTRUCTIONS
Sacrum,- Cleanse with Vashe - pat dry, apply skin prep to periwound, allow to dry, apply Santyl to areas of non viable tissue,  topical antibiotic compound to open areas (Both Bactrim and Zosyn received from [facility] - follow instructions on Prescription received for application. Cover with vashe moistened mesalt , gauze, abd pad, medfix tape   Change daily and prn soilage            R heel -  cleanse with wound cleanser, apply mesalt to open area, cover with gauze, abd, wrap lightly with kerlix, secure with tape, change every other day     Left anterior foot: cleanse with wound cleanser, Apply mesalt, cover with gauze, abd, wrap lightly with kerlix, secure with tape, change daily      No wound care by wound care center to Left hip or R posterior thigh, sites are being grafted by \[facility]\"" [facility]

## 2024-01-02 NOTE — ADDENDUM NOTE
Encounter addended by: Nguyen Pacheco RN on: 1/2/2024 3:44 PM   Actions taken: Clinical Note Signed, Order list changed, Diagnosis association updated

## 2024-01-02 NOTE — ADDENDUM NOTE
Encounter addended by: Ronald Barcenas MD on: 1/2/2024 3:44 PM   Actions taken: Clinical Note Signed

## 2024-01-02 NOTE — PROGRESS NOTES
Subjective:       Patient ID: Antonio Galvan II is a 58 y.o. male.    Chief Complaint: Pressure Ulcer (R heel , Left anterior ankle, sacral ulcer.  Follow up with md for re-evaluation and treatment)    HPI  Review of Systems      Objective:      Temp:  [97.8 °F (36.6 °C)]   Pulse:  [130]   Resp:  [16]   BP: (90)/(69)   SpO2:  [100 %]   Physical Exam       Altered Skin Integrity 05/06/22 1140 Right Heel  Full thickness tissue loss. Subcutaneous fat may be visible but bone, tendon or muscle are not exposed (Active)   05/06/22 1140   Altered Skin Integrity Present on Admission - Did Patient arrive to the hospital with altered skin?: yes   Side: Right   Orientation:    Location: Heel   Wound Number:    Is this injury device related?: No   Primary Wound Type:    Description of Altered Skin Integrity: Full thickness tissue loss. Subcutaneous fat may be visible but bone, tendon or muscle are not exposed   Ankle-Brachial Index:    Pulses:    Removal Indication and Assessment:    Wound Outcome:    (Retired) Wound Length (cm):    (Retired) Wound Width (cm):    (Retired) Depth (cm):    Wound Description (Comments):    Removal Indications:    Wound Image   01/02/24 1437   Dressing Appearance Intact;Moist drainage 01/02/24 1437   Drainage Amount Moderate 01/02/24 1437   Drainage Characteristics/Odor Sanguineous;No odor;Bleeding controlled 01/02/24 1437   Appearance Red;Granulating 01/02/24 1437   Tissue loss description Full thickness 01/02/24 1437   Red (%), Wound Tissue Color 100 % 01/02/24 1437   Periwound Area Scar tissue;Dry;Excoriated 01/02/24 1437   Wound Edges Irregular 01/02/24 1437   Wound Length (cm) 3 cm 01/02/24 1437   Wound Width (cm) 1.5 cm 01/02/24 1437   Wound Depth (cm) 0.1 cm 01/02/24 1437   Wound Volume (cm^3) 0.45 cm^3 01/02/24 1437   Wound Surface Area (cm^2) 4.5 cm^2 01/02/24 1437   Care Cleansed with:;Sterile normal saline 01/02/24 1437   Dressing Applied 01/02/24 1437            Altered Skin  Integrity 01/12/23 1530 Sacral spine Full thickness tissue loss with exposed bone, tendon, or muscle. Often includes undermining and tunneling. May extend into muscle and/or supporting structures. (Active)   01/12/23 1530   Altered Skin Integrity Present on Admission - Did Patient arrive to the hospital with altered skin?: yes   Side:    Orientation:    Location: Sacral spine   Wound Number:    Is this injury device related?:    Primary Wound Type:    Description of Altered Skin Integrity: Full thickness tissue loss with exposed bone, tendon, or muscle. Often includes undermining and tunneling. May extend into muscle and/or supporting structures.   Ankle-Brachial Index:    Pulses:    Removal Indication and Assessment:    Wound Outcome:    (Retired) Wound Length (cm):    (Retired) Wound Width (cm):    (Retired) Depth (cm):    Wound Description (Comments):    Removal Indications:    Wound Image   01/02/24 1437   Dressing Appearance Intact;Moist drainage 01/02/24 1437   Drainage Amount Moderate 01/02/24 1437   Drainage Characteristics/Odor Yellow;Serosanguineous 01/02/24 1437   Appearance Red;Granulating;Gray;Tan;Necrotic;Slough 01/02/24 1437   Tissue loss description Full thickness 01/02/24 1437   Red (%), Wound Tissue Color 30 % 01/02/24 1437   Yellow (%), Wound Tissue Color 70 % 01/02/24 1437   Periwound Area Scar tissue;Excoriated;Macerated 01/02/24 1437   Wound Edges Irregular;Jagged 01/02/24 1437   Wound Length (cm) 8.5 cm 01/02/24 1437   Wound Width (cm) 7.5 cm 01/02/24 1437   Wound Depth (cm) 1.5 cm 01/02/24 1437   Wound Volume (cm^3) 95.625 cm^3 01/02/24 1437   Wound Surface Area (cm^2) 63.75 cm^2 01/02/24 1437   Care Cleansed with:;Antimicrobial agent 01/02/24 1437   Dressing Applied;Sodium chloride impregnated;Gauze;Absorptive Pad 01/02/24 1437   Periwound Care Skin barrier film applied 01/02/24 1437            Altered Skin Integrity 08/01/23 1535 Left anterior Ankle Other (comment) Full thickness tissue  loss. Base is covered by slough and/or eschar in the wound bed (Active)   08/01/23 1535   Altered Skin Integrity Present on Admission - Did Patient arrive to the hospital with altered skin?: yes   Side: Left   Orientation: anterior   Location: Ankle   Wound Number:    Is this injury device related?: No   Primary Wound Type: Other   Description of Altered Skin Integrity: Full thickness tissue loss. Base is covered by slough and/or eschar in the wound bed   Ankle-Brachial Index:    Pulses: 2+ palpable and strong doppler pulses per MD   Removal Indication and Assessment:    Wound Outcome:    (Retired) Wound Length (cm):    (Retired) Wound Width (cm):    (Retired) Depth (cm):    Wound Description (Comments):    Removal Indications:    Wound Image    01/02/24 1437   Dressing Appearance Intact;Moist drainage 01/02/24 1437   Drainage Amount Small 01/02/24 1437   Drainage Characteristics/Odor Serosanguineous;No odor;Bleeding controlled 01/02/24 1437   Appearance Red;Not granulating;Tan;White;Fibrin 01/02/24 1437   Tissue loss description Full thickness 01/02/24 1437   Red (%), Wound Tissue Color 95 % 01/02/24 1437   Yellow (%), Wound Tissue Color 5 % 01/02/24 1437   Periwound Area Scar tissue;Maroon;Purple;Intact;Dry 01/02/24 1437   Wound Edges Irregular 01/02/24 1437   Wound Length (cm) 1.1 cm 01/02/24 1437   Wound Width (cm) 1 cm 01/02/24 1437   Wound Depth (cm) 0.1 cm 01/02/24 1437   Wound Volume (cm^3) 0.11 cm^3 01/02/24 1437   Wound Surface Area (cm^2) 1.1 cm^2 01/02/24 1437   Care Cleansed with:;Antimicrobial agent 01/02/24 1437   Dressing Applied;Sodium chloride impregnated;Gauze;Rolled gauze;Absorptive Pad 01/02/24 1437   Packing packed with 01/02/24 1437   Periwound Care Dry periwound area maintained 01/02/24 1437         Assessment:     Today wound of right heel and left dorsum foot there is good granulation tissue.  Mesalt was applied to both areas.  And bandaged.  Today sacrum is still the same size but there  was no drainage and no odor.  A selective debridement was done of the area to remove devitalized tissue.  See procedure note.  Antibiotic compound was applied and Santyl this was also covered with Vashe moistened Mesalt.  Dressings will be changed daily.  Patient will return in 1 week for MD visit.    ICD-10-CM ICD-9-CM   1. Pressure injury of right heel, stage 3  L89.613 707.07     707.23   2. Pressure injury of sacral region, stage 4  L89.154 707.03     707.24   3. Pressure injury of left ankle, stage 3  L89.523 707.06     707.23         Plan:   Tissue pathology and/or culture taken:  [] Yes [x] No   Sharp debridement performed:   [x] Yes [] No   Labs ordered this visit:   [] Yes [x] No   Imaging ordered this visit:   [] Yes [x] No           Orders Placed This Encounter   Procedures    Debridement     This order was created via procedure documentation     Standing Status:   Standing     Number of Occurrences:   1    Change dressing     Sacrum,- Cleanse with Vashe - pat dry, apply skin prep to periwound, allow to dry, apply Santyl to areas of non viable tissue,  topical antibiotic compound to open areas (Both Bactrim and Zosyn received from [facility] pharmacy - follow instructions on Prescription received for application. Cover with vashe moistened mesalt , gauze, abd pad, medfix tape   Change daily and prn soilage            R heel -  cleanse with wound cleanser, apply mesalt to open area, cover with gauze, abd, wrap lightly with kerlix, secure with tape, change every other day     Left anterior foot: cleanse with wound cleanser, Apply mesalt, cover with gauze, abd, wrap lightly with kerlix, secure with tape, change daily      No wound care by wound care center to Left hip or R posterior thigh, sites are being grafted by [facility]        Follow up in about 1 week (around 1/9/2024) for md visit .

## 2024-01-02 NOTE — PROCEDURES
"Debridement    Date/Time: 1/2/2024 2:07 PM    Performed by: Ronald Barcenas MD  Authorized by: Ronald Barcenas MD  Associated wounds:        Altered Skin Integrity 01/12/23 1530 Sacral spine Full thickness tissue loss with exposed bone, tendon, or muscle. Often includes undermining and tunneling. May extend into muscle and/or supporting structures.  Time out: Immediately prior to procedure a "time out" was called to verify the correct patient, procedure, equipment, support staff and site/side marked as required.    Consent Done?:  Yes (Written)    Preparation: Patient was prepped and draped with clean technique    Local anesthesia used?: No      Wound Details:    Location:  Sacrum    Type of Debridement:  Non-excisional       Length (cm):  8       Area (sq cm):  60       Width (cm):  7.5       Percent Debrided (%):  100       Depth (cm):  0.3       Total Area Debrided (sq cm):  60    Depth of debridement:  Muscle/fascia/tendon    Tissue debrided:  Fascia, Subcutaneous and Adipose    Devitalized tissue debrided:  Biofilm, Necrotic/Eschar, Slough and Fibrin    Instruments:  Blade and Curette  Bleeding:  Minimal  Hemostasis Achieved: Yes  Method Used:  Pressure  Patient tolerance:  Patient tolerated the procedure well with no immediate complications    "

## 2024-01-09 ENCOUNTER — HOSPITAL ENCOUNTER (OUTPATIENT)
Dept: WOUND CARE | Facility: HOSPITAL | Age: 59
Discharge: HOME | End: 2024-01-09
Attending: STUDENT IN AN ORGANIZED HEALTH CARE EDUCATION/TRAINING PROGRAM
Payer: MEDICARE

## 2024-01-09 VITALS
HEART RATE: 57 BPM | TEMPERATURE: 98 F | RESPIRATION RATE: 20 BRPM | OXYGEN SATURATION: 100 % | SYSTOLIC BLOOD PRESSURE: 116 MMHG | DIASTOLIC BLOOD PRESSURE: 77 MMHG

## 2024-01-09 DIAGNOSIS — L89.154 PRESSURE INJURY OF SACRAL REGION, STAGE 4: ICD-10-CM

## 2024-01-09 DIAGNOSIS — L89.613 PRESSURE INJURY OF RIGHT HEEL, STAGE 3: Primary | ICD-10-CM

## 2024-01-09 DIAGNOSIS — L89.523 PRESSURE INJURY OF LEFT ANKLE, STAGE 3: ICD-10-CM

## 2024-01-09 PROCEDURE — 99213 OFFICE O/P EST LOW 20 MIN: CPT | Mod: ,,, | Performed by: PEDIATRICS

## 2024-01-09 PROCEDURE — 99214 OFFICE O/P EST MOD 30 MIN: CPT

## 2024-01-09 NOTE — PATIENT INSTRUCTIONS
Sacrum,- Cleanse with Vashe - pat dry, apply skin prep to periwound, allow to dry, apply Santyl to areas of non viable tissue,  topical antibiotic compound to open areas (Both Bactrim and Zosyn received from professional [facility] pharmacy - follow instructions on Prescription received for application. Cover with vashe moistened gauze , gauze, abd pad, medfix tape   Change daily and prn soilage      R heel -  cleanse with wound cleanser, apply mesalt to open area, cover with gauze, abd, wrap lightly with kerlix, secure with tape, change every other day     Left anterior foot: cleanse with wound cleanser, Apply mesalt, cover with gauze, abd, wrap lightly with kerlix, secure with tape, change daily      No wound care by wound care center to Left hip or R posterior thigh, sites are being grafted by [facility]

## 2024-01-09 NOTE — PROGRESS NOTES
Subjective:       Patient ID: Antonio Galvan II is a 58 y.o. male.    Chief Complaint: Pressure Ulcer ( Pressure Ulcer (R heel , Left anterior ankle, sacral ulcer.  Follow up with md for re-evaluation and treatment). Restarted Xarelto on 1/5/24. )    HPI  Review of Systems      Objective:      Temp:  [97.5 °F (36.4 °C)]   Pulse:  [57]   Resp:  [20]   BP: (116)/(77)   SpO2:  [100 %]   Physical Exam       Altered Skin Integrity 05/06/22 1140 Right Heel  Full thickness tissue loss. Subcutaneous fat may be visible but bone, tendon or muscle are not exposed (Active)   05/06/22 1140   Altered Skin Integrity Present on Admission - Did Patient arrive to the hospital with altered skin?: yes   Side: Right   Orientation:    Location: Heel   Wound Number:    Is this injury device related?: No   Primary Wound Type:    Description of Altered Skin Integrity: Full thickness tissue loss. Subcutaneous fat may be visible but bone, tendon or muscle are not exposed   Ankle-Brachial Index:    Pulses:    Removal Indication and Assessment:    Wound Outcome:    (Retired) Wound Length (cm):    (Retired) Wound Width (cm):    (Retired) Depth (cm):    Wound Description (Comments):    Removal Indications:    Dressing Appearance Intact;Dried drainage 01/09/24 1444   Drainage Amount Small 01/09/24 1444   Drainage Characteristics/Odor Serosanguineous;No odor;Bleeding controlled 01/09/24 1444   Appearance Red;Granulating 01/09/24 1444   Tissue loss description Full thickness 01/09/24 1444   Red (%), Wound Tissue Color 100 % 01/09/24 1444   Periwound Area Dry;Scar tissue;Excoriated 01/09/24 1444   Wound Edges Irregular 01/09/24 1444   Wound Length (cm) 2.5 cm 01/09/24 1444   Wound Width (cm) 1.3 cm 01/09/24 1444   Wound Depth (cm) 0.1 cm 01/09/24 1444   Wound Volume (cm^3) 0.325 cm^3 01/09/24 1444   Wound Surface Area (cm^2) 3.25 cm^2 01/09/24 1444   Care Cleansed with:;Antimicrobial agent 01/09/24 1444   Dressing Applied;Sodium chloride  impregnated;Gauze;Absorptive Pad;Rolled gauze 01/09/24 1444   Periwound Care Dry periwound area maintained 01/09/24 1444            Altered Skin Integrity 01/12/23 1530 Sacral spine Full thickness tissue loss with exposed bone, tendon, or muscle. Often includes undermining and tunneling. May extend into muscle and/or supporting structures. (Active)   01/12/23 1530   Altered Skin Integrity Present on Admission - Did Patient arrive to the hospital with altered skin?: yes   Side:    Orientation:    Location: Sacral spine   Wound Number:    Is this injury device related?:    Primary Wound Type:    Description of Altered Skin Integrity: Full thickness tissue loss with exposed bone, tendon, or muscle. Often includes undermining and tunneling. May extend into muscle and/or supporting structures.   Ankle-Brachial Index:    Pulses:    Removal Indication and Assessment:    Wound Outcome:    (Retired) Wound Length (cm):    (Retired) Wound Width (cm):    (Retired) Depth (cm):    Wound Description (Comments):    Removal Indications:    Dressing Appearance Intact;Moist drainage 01/09/24 1444   Drainage Amount Large 01/09/24 1444   Drainage Characteristics/Odor Serosanguineous;Sanguineous 01/09/24 1444   Appearance Red;Not granulating;Tan;Fibrin;Gray;Purple 01/09/24 1444   Tissue loss description Full thickness 01/09/24 1444   Red (%), Wound Tissue Color 30 % 01/09/24 1444   Yellow (%), Wound Tissue Color 70 % 01/09/24 1444   Periwound Area Scar tissue;Ecchymotic;Excoriated 01/09/24 1444   Wound Edges Irregular;Open 01/09/24 1444   Wound Length (cm) 8 cm 01/09/24 1444   Wound Width (cm) 8 cm 01/09/24 1444   Wound Depth (cm) 1.3 cm 01/09/24 1444   Wound Volume (cm^3) 83.2 cm^3 01/09/24 1444   Wound Surface Area (cm^2) 64 cm^2 01/09/24 1444   Care Cleansed with:;Antimicrobial agent 01/09/24 1444   Periwound Care Skin barrier film applied 01/09/24 1444            Altered Skin Integrity 08/01/23 1535 Left anterior Ankle Other  (comment) Full thickness tissue loss. Base is covered by slough and/or eschar in the wound bed (Active)   08/01/23 1535   Altered Skin Integrity Present on Admission - Did Patient arrive to the hospital with altered skin?: yes   Side: Left   Orientation: anterior   Location: Ankle   Wound Number:    Is this injury device related?: No   Primary Wound Type: Other   Description of Altered Skin Integrity: Full thickness tissue loss. Base is covered by slough and/or eschar in the wound bed   Ankle-Brachial Index:    Pulses: 2+ palpable and strong doppler pulses per MD   Removal Indication and Assessment:    Wound Outcome:    (Retired) Wound Length (cm):    (Retired) Wound Width (cm):    (Retired) Depth (cm):    Wound Description (Comments):    Removal Indications:    Dressing Appearance Intact;Moist drainage 01/09/24 1444   Drainage Amount Small 01/09/24 1444   Drainage Characteristics/Odor Serosanguineous 01/09/24 1444   Appearance Red;Granulating;Yellow;Fibrin 01/09/24 1444   Tissue loss description Full thickness 01/09/24 1444   Red (%), Wound Tissue Color 95 % 01/09/24 1444   Yellow (%), Wound Tissue Color 5 % 01/09/24 1444   Periwound Area Scar tissue;Maroon 01/09/24 1444   Wound Edges Defined 01/09/24 1444   Wound Length (cm) 1 cm 01/09/24 1444   Wound Width (cm) 1 cm 01/09/24 1444   Wound Depth (cm) 0.1 cm 01/09/24 1444   Wound Volume (cm^3) 0.1 cm^3 01/09/24 1444   Wound Surface Area (cm^2) 1 cm^2 01/09/24 1444   Care Cleansed with:;Antimicrobial agent 01/09/24 1444   Dressing Applied;Sodium chloride impregnated;Gauze;Absorptive Pad;Rolled gauze 01/09/24 1444   Periwound Care Dry periwound area maintained 01/09/24 1444         Assessment:       Today right heel wound is 2.5 cm x 1.3 cm.  This is red and granulating.  Area was cleaned and Mesalt applied to the open area.  Left anterior foot was cleaned and Mesalt was applied.  This wound is red and granulating and wound is 1 cm x 1 cm.  Today sacral area has wound  that is 8 cm x 8 cm with a depth of 1.3 cm.  This is red and gray and bruised looking.  Patient is currently on anticoagulants.  There is also some mild active bleeding after cleaning.  Because of this no debridements were done today.  This was cleaned with Vashe and Santyl and topical antibiotic was applied.  This was covered with ABD pad and tape.  Dressings will be changed daily.  Patient will return in 1 week for MD visit    ICD-10-CM ICD-9-CM   1. Pressure injury of right heel, stage 3  L89.613 707.07     707.23   2. Pressure injury of sacral region, stage 4  L89.154 707.03     707.24   3. Pressure injury of left ankle, stage 3  L89.523 707.06     707.23         Plan:   Tissue pathology and/or culture taken:  [] Yes [x] No   Sharp debridement performed:   [] Yes [x] No   Labs ordered this visit:   [] Yes [x] No   Imaging ordered this visit:   [] Yes [x] No           Orders Placed This Encounter   Procedures    Change dressing     Sacrum,- Cleanse with Vashe - pat dry, apply skin prep to periwound, allow to dry, apply Santyl to areas of non viable tissue,  topical antibiotic compound to open areas (Both Bactrim and Zosyn received from [facility] - follow instructions on Prescription received for application. Cover with vashe moistened gauze , gauze, abd pad, medfix tape   Change daily and prn soilage      R heel -  cleanse with wound cleanser, apply mesalt to open area, cover with gauze, abd, wrap lightly with kerlix, secure with tape, change every other day     Left anterior foot: cleanse with wound cleanser, Apply mesalt, cover with gauze, abd, wrap lightly with kerlix, secure with tape, change daily      No wound care by wound care center to Left hip or R posterior thigh, sites are being grafted by [facility]        Follow up in about 1 week (around 1/16/2024) for MD visit.

## 2024-01-22 NOTE — PATIENT INSTRUCTIONS
Sacrum,- Cleanse with Vashe - pat dry, apply skin prep to periwound, allow to dry, apply Santyl to areas of non viable tissue,  topical antibiotic compound to open areas (Both Bactrim and Zosyn received from professional [facility] pharmacy - follow instructions on Prescription received for application. Cover with vashe moistened gauze , gauze, abd pad, medfix tape   Change daily and prn soilage      R heel -  cleanse with wound cleanser, apply adaptic open area,Cover with Aquacel,  apply betadine to bruising on Right foot plantar lateral aspect, cover with gauze, abd, wrap lightly with kerlix, secure with tape, change every other day     Left anterior foot: cleanse with wound cleanser, Apply adaptic then Aquacel,  cover with gauze, wrap lightly with kerlix, secure with tape, change every other day.          No wound care by wound care center to Left hip or R posterior thigh, sites are being grafted by [facility]

## 2024-01-23 ENCOUNTER — HOSPITAL ENCOUNTER (OUTPATIENT)
Dept: WOUND CARE | Facility: HOSPITAL | Age: 59
Discharge: HOME | End: 2024-01-23
Attending: STUDENT IN AN ORGANIZED HEALTH CARE EDUCATION/TRAINING PROGRAM
Payer: MEDICARE

## 2024-01-23 VITALS
HEART RATE: 99 BPM | SYSTOLIC BLOOD PRESSURE: 116 MMHG | DIASTOLIC BLOOD PRESSURE: 77 MMHG | RESPIRATION RATE: 99 BRPM | TEMPERATURE: 98 F

## 2024-01-23 DIAGNOSIS — L89.523 PRESSURE INJURY OF LEFT ANKLE, STAGE 3: ICD-10-CM

## 2024-01-23 DIAGNOSIS — L89.613 PRESSURE INJURY OF RIGHT HEEL, STAGE 3: Primary | ICD-10-CM

## 2024-01-23 DIAGNOSIS — L89.154 PRESSURE INJURY OF SACRAL REGION, STAGE 4: ICD-10-CM

## 2024-01-23 PROCEDURE — 99213 OFFICE O/P EST LOW 20 MIN: CPT | Mod: ,,, | Performed by: PEDIATRICS

## 2024-01-23 PROCEDURE — 27000999 HC MEDICAL RECORD PHOTO DOCUMENTATION

## 2024-01-23 PROCEDURE — 99213 OFFICE O/P EST LOW 20 MIN: CPT

## 2024-01-23 NOTE — PROGRESS NOTES
Subjective:       Patient ID: Antonio Galvan II is a 58 y.o. male.    Chief Complaint: Pressure Ulcer (Sacral ulceration, R heel ulceration.  Left anterior ankle ulceration.   Home health called to report new area of pressure to R lateral ankle.    Here for evaluation and treatment with md. )    HPI  Review of Systems      Objective:      Temp:  [97.8 °F (36.6 °C)]   Pulse:  [99]   Resp:  [99]   BP: (116)/(77)   Physical Exam       Altered Skin Integrity 05/06/22 1140 Right Heel  Full thickness tissue loss. Subcutaneous fat may be visible but bone, tendon or muscle are not exposed (Active)   05/06/22 1140   Altered Skin Integrity Present on Admission - Did Patient arrive to the hospital with altered skin?: yes   Side: Right   Orientation:    Location: Heel   Wound Number:    Is this injury device related?: No   Primary Wound Type:    Description of Altered Skin Integrity: Full thickness tissue loss. Subcutaneous fat may be visible but bone, tendon or muscle are not exposed   Ankle-Brachial Index:    Pulses:    Removal Indication and Assessment:    Wound Outcome:    (Retired) Wound Length (cm):    (Retired) Wound Width (cm):    (Retired) Depth (cm):    Wound Description (Comments):    Removal Indications:    Wound Image    01/23/24 1541   Dressing Appearance Intact;Dried drainage 01/23/24 1541   Drainage Amount Moderate 01/23/24 1541   Drainage Characteristics/Odor Serosanguineous 01/23/24 1541   Appearance Red;Granulating 01/23/24 1541   Tissue loss description Full thickness 01/23/24 1541   Red (%), Wound Tissue Color 100 % 01/23/24 1541   Periwound Area Dry;Scar tissue;Ecchymotic;Redness 01/23/24 1541   Wound Edges Irregular 01/23/24 1541   Wound Length (cm) 4.5 cm 01/23/24 1541   Wound Width (cm) 2.5 cm 01/23/24 1541   Wound Depth (cm) 0.1 cm 01/23/24 1541   Wound Volume (cm^3) 1.125 cm^3 01/23/24 1541   Wound Surface Area (cm^2) 11.25 cm^2 01/23/24 1541   Care Cleansed with:;Sterile normal saline 01/23/24  1541   Dressing Applied;Non-adherent;Hydrofiber;Gauze;Absorptive Pad;Rolled gauze;Tubular bandage 01/23/24 1541   Periwound Care Dry periwound area maintained 01/23/24 1541            Altered Skin Integrity 01/12/23 1530 Sacral spine Full thickness tissue loss with exposed bone, tendon, or muscle. Often includes undermining and tunneling. May extend into muscle and/or supporting structures. (Active)   01/12/23 1530   Altered Skin Integrity Present on Admission - Did Patient arrive to the hospital with altered skin?: yes   Side:    Orientation:    Location: Sacral spine   Wound Number:    Is this injury device related?:    Primary Wound Type:    Description of Altered Skin Integrity: Full thickness tissue loss with exposed bone, tendon, or muscle. Often includes undermining and tunneling. May extend into muscle and/or supporting structures.   Ankle-Brachial Index:    Pulses:    Removal Indication and Assessment:    Wound Outcome:    (Retired) Wound Length (cm):    (Retired) Wound Width (cm):    (Retired) Depth (cm):    Wound Description (Comments):    Removal Indications:    Wound Image    01/23/24 1541   Dressing Appearance Intact;Moist drainage 01/23/24 1541   Drainage Amount Moderate 01/23/24 1541   Drainage Characteristics/Odor Serosanguineous;No odor;Bleeding controlled 01/23/24 1541   Appearance Red;Granulating;White;Yellow;Dry;Eschar;Slough;Adipose 01/23/24 1541   Tissue loss description Full thickness 01/23/24 1541   Red (%), Wound Tissue Color 40 % 01/23/24 1541   Yellow (%), Wound Tissue Color 60 % 01/23/24 1541   Periwound Area Excoriated;Macerated;Scar tissue;Denuded;Moist 01/23/24 1541   Wound Edges Irregular 01/23/24 1541   Wound Length (cm) 8 cm 01/23/24 1541   Wound Width (cm) 7.4 cm 01/23/24 1541   Wound Depth (cm) 1.2 cm 01/23/24 1541   Wound Volume (cm^3) 71.04 cm^3 01/23/24 1541   Wound Surface Area (cm^2) 59.2 cm^2 01/23/24 1541   Care Cleansed with:;Antimicrobial agent 01/23/24 1541   Dressing  Applied;Sodium chloride impregnated;Gauze;Absorptive Pad 01/23/24 1541   Periwound Care Skin barrier film applied 01/23/24 1541            Altered Skin Integrity 08/01/23 1535 Left anterior Ankle Other (comment) Full thickness tissue loss. Base is covered by slough and/or eschar in the wound bed (Active)   08/01/23 1535   Altered Skin Integrity Present on Admission - Did Patient arrive to the hospital with altered skin?: yes   Side: Left   Orientation: anterior   Location: Ankle   Wound Number:    Is this injury device related?: No   Primary Wound Type: Other   Description of Altered Skin Integrity: Full thickness tissue loss. Base is covered by slough and/or eschar in the wound bed   Ankle-Brachial Index:    Pulses: 2+ palpable and strong doppler pulses per MD   Removal Indication and Assessment:    Wound Outcome:    (Retired) Wound Length (cm):    (Retired) Wound Width (cm):    (Retired) Depth (cm):    Wound Description (Comments):    Removal Indications:    Wound Image   01/23/24 1541   Dressing Appearance Intact;Moist drainage 01/23/24 1541   Drainage Amount Small 01/23/24 1541   Drainage Characteristics/Odor Serosanguineous 01/23/24 1541   Appearance Red;Granulating 01/23/24 1541   Tissue loss description Full thickness 01/23/24 1541   Red (%), Wound Tissue Color 100 % 01/23/24 1541   Periwound Area Scar tissue 01/23/24 1541   Wound Edges Defined 01/23/24 1541   Wound Length (cm) 0.9 cm 01/23/24 1541   Wound Width (cm) 0.7 cm 01/23/24 1541   Wound Depth (cm) 0.1 cm 01/23/24 1541   Wound Volume (cm^3) 0.063 cm^3 01/23/24 1541   Wound Surface Area (cm^2) 0.63 cm^2 01/23/24 1541   Care Cleansed with:;Antimicrobial agent 01/23/24 1541   Dressing Applied;Non-adherent;Hydrofiber;Gauze;Rolled gauze 01/23/24 1541   Periwound Care Dry periwound area maintained 01/23/24 1541         Assessment:     Right heel today is red and granulating with some bleeding.  Adaptic and Hydrofiber was applied.  Right lateral foot with  bruising was covered with Betadine.  Left anterior ankle and Adaptic and Aquacel was applied.  Today a number 7 curette was used to score of the areas of the sacral wound.  This is beginning to get down to viable tissue.  Topical antibiotics was applied and Santyl areas of nonviable tissue.  Sacral dressing will be changed every day and right heel every other day.  Left anterior foot will be changed every other day.  Patient will return in 1 week for MD visit.    ICD-10-CM ICD-9-CM   1. Pressure injury of right heel, stage 3  L89.613 707.07     707.23   2. Pressure injury of sacral region, stage 4  L89.154 707.03     707.24   3. Pressure injury of left ankle, stage 3  L89.523 707.06     707.23         Plan:   Tissue pathology and/or culture taken:  [] Yes [x] No   Sharp debridement performed:   [] Yes [x] No   Labs ordered this visit:   [] Yes [x] No   Imaging ordered this visit:   [] Yes [x] No           Orders Placed This Encounter   Procedures    Change dressing     Sacrum,- Cleanse with Vashe - pat dry, apply skin prep to periwound, allow to dry, apply Santyl to areas of non viable tissue,  topical antibiotic compound to open areas (Both Bactrim and [facility]osyn [facility] - follow instructions on Prescription received for application. Cover with vashe moistened gauze , gauze, abd pad, medfix tape   Change daily and prn soilage      R heel -  cleanse with wound cleanser, apply adaptic open area,Cover with Aquacel,  apply betadine to bruising on Right foot plantar lateral aspect, cover with gauze, abd, wrap lightly with kerlix, secure with tape, change every other day     Left anterior foot: cleanse with wound cleanser, Apply adaptic then Aquacel,  cover with gauze, wrap lightly with kerlix, secure with tape, change every other day.          No wound care by wound care center to Left hip or R posterior thigh, sites are being grafted by [facility]        Follow up in about 1 week (around  1/30/2024) for md visit .

## 2024-01-30 ENCOUNTER — HOSPITAL ENCOUNTER (OUTPATIENT)
Dept: WOUND CARE | Facility: HOSPITAL | Age: 59
Discharge: HOME | End: 2024-01-30
Attending: STUDENT IN AN ORGANIZED HEALTH CARE EDUCATION/TRAINING PROGRAM
Payer: MEDICARE

## 2024-01-30 VITALS
TEMPERATURE: 98 F | OXYGEN SATURATION: 97 % | HEART RATE: 54 BPM | RESPIRATION RATE: 18 BRPM | DIASTOLIC BLOOD PRESSURE: 80 MMHG | SYSTOLIC BLOOD PRESSURE: 116 MMHG

## 2024-01-30 DIAGNOSIS — L89.613 PRESSURE INJURY OF RIGHT HEEL, STAGE 3: Primary | ICD-10-CM

## 2024-01-30 DIAGNOSIS — L89.154 PRESSURE INJURY OF SACRAL REGION, STAGE 4: ICD-10-CM

## 2024-01-30 DIAGNOSIS — L89.523 PRESSURE INJURY OF LEFT ANKLE, STAGE 3: ICD-10-CM

## 2024-01-30 PROCEDURE — 11043 DBRDMT MUSC&/FSCA 1ST 20/<: CPT | Mod: ,,, | Performed by: PEDIATRICS

## 2024-01-30 PROCEDURE — 27000999 HC MEDICAL RECORD PHOTO DOCUMENTATION

## 2024-01-30 PROCEDURE — 11043 DBRDMT MUSC&/FSCA 1ST 20/<: CPT

## 2024-01-30 PROCEDURE — 99213 OFFICE O/P EST LOW 20 MIN: CPT | Mod: 25

## 2024-01-30 PROCEDURE — 99213 OFFICE O/P EST LOW 20 MIN: CPT | Mod: 25,,, | Performed by: PEDIATRICS

## 2024-01-30 PROCEDURE — 11046 DBRDMT MUSC&/FSCA EA ADDL: CPT

## 2024-01-30 PROCEDURE — 11046 DBRDMT MUSC&/FSCA EA ADDL: CPT | Mod: ,,, | Performed by: PEDIATRICS

## 2024-01-30 NOTE — PROCEDURES
"Debridement    Date/Time: 1/30/2024 2:10 PM    Performed by: Ronald Barcenas MD  Authorized by: Ronald Barcenas MD  Associated wounds:        Altered Skin Integrity 01/12/23 1530 Sacral spine Full thickness tissue loss with exposed bone, tendon, or muscle. Often includes undermining and tunneling. May extend into muscle and/or supporting structures.  Time out: Immediately prior to procedure a "time out" was called to verify the correct patient, procedure, equipment, support staff and site/side marked as required.    Consent Done?:  Yes (Written)    Preparation: Patient was prepped and draped with clean technique    Local anesthesia used?: No      Wound Details:    Location:  Sacrum    Type of Debridement:  Non-excisional       Length (cm):  8.5       Area (sq cm):  61.2       Width (cm):  7.2       Percent Debrided (%):  100       Depth (cm):  1.3       Total Area Debrided (sq cm):  61.2    Depth of debridement:  Muscle/fascia/tendon    Tissue debrided:  Adipose and Subcutaneous    Devitalized tissue debrided:  Necrotic/Eschar, Slough and Fibrin    Instruments:  Curette, Blade and Scissors  Bleeding:  Minimal  Hemostasis Achieved: Yes  Method Used:  Pressure  Patient tolerance:  Patient tolerated the procedure well with no immediate complications    "

## 2024-01-30 NOTE — PATIENT INSTRUCTIONS
Sacrum,- Cleanse with Vashe - pat dry, apply skin prep to periwound, allow to dry, apply Santyl to areas of non viable tissue,  topical antibiotic compound to open areas (Both Bactrim and Zosyn received from [facility] - follow instructions on Prescription received for application. Cover with vashe moistened gauze , gauze, abd pad or Mextra Superabsorbant dressing, secure with  medfix tape   Change daily and prn soilage      R heel -  cleanse with wound cleanser, apply adaptic open area,Cover with Aquacel,  apply betadine to bruising on Right foot plantar lateral aspect, cover with gauze, abd, wrap lightly with kerlix, secure with tape, change every other day     Left anterior foot: cleanse with wound cleanser, Apply adaptic then Aquacel,  cover with gauze, wrap lightly with kerlix, secure with tape, change every other day.           No wound care by wound care center to Left hip or R posterior thigh, sites are being grafted by [facility]

## 2024-01-30 NOTE — PROGRESS NOTES
Subjective:       Patient ID: Antonio Galvan II is a 58 y.o. male.    Chief Complaint: Pressure Ulcer (R heel, L anterior ankle, sacral pressure ulcers.   Here for re-evaluation and treatment with md. )    HPI  Review of Systems      Objective:      Temp:  [98.4 °F (36.9 °C)]   Pulse:  [54]   Resp:  [18]   BP: (116)/(80)   SpO2:  [97 %]   Physical Exam       Altered Skin Integrity 05/06/22 1140 Right Heel  Full thickness tissue loss. Subcutaneous fat may be visible but bone, tendon or muscle are not exposed (Active)   05/06/22 1140   Altered Skin Integrity Present on Admission - Did Patient arrive to the hospital with altered skin?: yes   Side: Right   Orientation:    Location: Heel   Wound Number:    Is this injury device related?: No   Primary Wound Type:    Description of Altered Skin Integrity: Full thickness tissue loss. Subcutaneous fat may be visible but bone, tendon or muscle are not exposed   Ankle-Brachial Index:    Pulses:    Removal Indication and Assessment:    Wound Outcome:    (Retired) Wound Length (cm):    (Retired) Wound Width (cm):    (Retired) Depth (cm):    Wound Description (Comments):    Removal Indications:    Wound Image   01/30/24 1421   Dressing Appearance Intact;Moist drainage 01/30/24 1421   Drainage Amount Moderate 01/30/24 1421   Drainage Characteristics/Odor Serosanguineous;No odor;Bleeding controlled 01/30/24 1421   Appearance Red;Granulating 01/30/24 1421   Tissue loss description Full thickness 01/30/24 1421   Red (%), Wound Tissue Color 100 % 01/30/24 1421   Periwound Area Scar tissue;Dry;Other (see comments) 01/30/24 1421   Wound Edges Defined 01/30/24 1421   Wound Length (cm) 3.5 cm 01/30/24 1421   Wound Width (cm) 2 cm 01/30/24 1421   Wound Depth (cm) 0.1 cm 01/30/24 1421   Wound Volume (cm^3) 0.7 cm^3 01/30/24 1421   Wound Surface Area (cm^2) 7 cm^2 01/30/24 1421   Care Cleansed with:;Sterile normal saline 01/30/24 1421   Dressing  Applied;Non-adherent;Hydrofiber;Gauze;Rolled gauze 01/30/24 1421            Altered Skin Integrity 01/12/23 1530 Sacral spine Full thickness tissue loss with exposed bone, tendon, or muscle. Often includes undermining and tunneling. May extend into muscle and/or supporting structures. (Active)   01/12/23 1530   Altered Skin Integrity Present on Admission - Did Patient arrive to the hospital with altered skin?: yes   Side:    Orientation:    Location: Sacral spine   Wound Number:    Is this injury device related?:    Primary Wound Type:    Description of Altered Skin Integrity: Full thickness tissue loss with exposed bone, tendon, or muscle. Often includes undermining and tunneling. May extend into muscle and/or supporting structures.   Ankle-Brachial Index:    Pulses:    Removal Indication and Assessment:    Wound Outcome:    (Retired) Wound Length (cm):    (Retired) Wound Width (cm):    (Retired) Depth (cm):    Wound Description (Comments):    Removal Indications:    Wound Image   01/30/24 1421   Dressing Appearance Intact;Moist drainage 01/30/24 1421   Drainage Amount Moderate 01/30/24 1421   Drainage Characteristics/Odor Creamy;Tan 01/30/24 1421   Appearance Red;Granulating;Tan;Gray;Yellow;Necrotic;Adipose 01/30/24 1421   Tissue loss description Full thickness 01/30/24 1421   Red (%), Wound Tissue Color 45 % 01/30/24 1421   Yellow (%), Wound Tissue Color 55 % 01/30/24 1421   Periwound Area Scar tissue;Moist 01/30/24 1421   Wound Edges Irregular 01/30/24 1421   Wound Length (cm) 8.5 cm 01/30/24 1421   Wound Width (cm) 7.2 cm 01/30/24 1421   Wound Depth (cm) 1.3 cm 01/30/24 1421   Wound Volume (cm^3) 79.56 cm^3 01/30/24 1421   Wound Surface Area (cm^2) 61.2 cm^2 01/30/24 1421   Care Cleansed with:;Antimicrobial agent;Debrided 01/30/24 1421   Dressing Applied;Gauze;Other (comment) 01/30/24 1421   Periwound Care Skin barrier film applied 01/30/24 1421            Altered Skin Integrity 08/01/23 1535 Left anterior  Ankle Other (comment) Full thickness tissue loss. Base is covered by slough and/or eschar in the wound bed (Active)   08/01/23 1535   Altered Skin Integrity Present on Admission - Did Patient arrive to the hospital with altered skin?: yes   Side: Left   Orientation: anterior   Location: Ankle   Wound Number:    Is this injury device related?: No   Primary Wound Type: Other   Description of Altered Skin Integrity: Full thickness tissue loss. Base is covered by slough and/or eschar in the wound bed   Ankle-Brachial Index:    Pulses: 2+ palpable and strong doppler pulses per MD   Removal Indication and Assessment:    Wound Outcome:    (Retired) Wound Length (cm):    (Retired) Wound Width (cm):    (Retired) Depth (cm):    Wound Description (Comments):    Removal Indications:    Wound Image   01/30/24 1421   Dressing Appearance Intact;Moist drainage 01/30/24 1421   Drainage Amount Small 01/30/24 1421   Drainage Characteristics/Odor Serosanguineous;No odor 01/30/24 1421   Appearance Red;Granulating 01/30/24 1421   Tissue loss description Full thickness 01/30/24 1421   Red (%), Wound Tissue Color 100 % 01/30/24 1421   Periwound Area Scar tissue 01/30/24 1421   Wound Edges Defined 01/30/24 1421   Wound Length (cm) 1 cm 01/30/24 1421   Wound Width (cm) 1 cm 01/30/24 1421   Wound Depth (cm) 0.1 cm 01/30/24 1421   Wound Volume (cm^3) 0.1 cm^3 01/30/24 1421   Wound Surface Area (cm^2) 1 cm^2 01/30/24 1421   Care Cleansed with:;Antimicrobial agent 01/30/24 1421   Dressing Applied;Non-adherent;Hydrofiber;Gauze;Rolled gauze 01/30/24 1421   Periwound Care Dry periwound area maintained 01/30/24 1421         Assessment:     Today right heel is improving and is 3.5 cm x 2 cm with a depth of 0.1 cm.  This is reddened granulating.  This was covered with Aquacel Ag.  Left anterior ankle is reddened granulating with a wound of 1 cm x 1 cm with a depth of 0.1 cm and again Aquacel was applied.  Sacral wound with large quantity of fibrous  material and muscle.  Because of this a selective debridement was done.  See procedure note.  Area was cleaned and topical antibiotic and Santyl was applied.  Dressings will be done daily and patient for MD visit    ICD-10-CM ICD-9-CM   1. Pressure injury of right heel, stage 3  L89.613 707.07     707.23   2. Pressure injury of sacral region, stage 4  L89.154 707.03     707.24   3. Pressure injury of left ankle, stage 3  L89.523 707.06     707.23         Plan:   Tissue pathology and/or culture taken:  [] Yes [x] No   Sharp debridement performed:   [x] Yes [] No   Labs ordered this visit:   [] Yes [x] No   Imaging ordered this visit:   [] Yes [x] No           Orders Placed This Encounter   Procedures    Change dressing     Sacrum,- Cleanse with Vashe - pat dry, apply skin prep to periwound, allow to dry, apply Santyl to areas of non viable tissue,  topical antibiotic compound to open areas (Both Bactrim and Zosyn received from [facility] - follow instructions on Prescription received for application. Cover with vashe moistened gauze , gauze, abd pad or Mextra Superabsorbant dressing, secure with  medfix tape   Change daily and prn soilage      R heel -  cleanse with wound cleanser, apply adaptic open area,Cover with Aquacel,  apply betadine to bruising on Right foot plantar lateral aspect, cover with gauze, abd, wrap lightly with kerlix, secure with tape, change every other day     Left anterior foot: cleanse with wound cleanser, Apply adaptic then Aquacel,  cover with gauze, wrap lightly with kerlix, secure with tape, change every other day.           No wound care by wound care center to Left hip or R posterior thigh, sites are being grafted by [facility]        Follow up in about 1 week (around 2/6/2024) for md visit.

## 2024-02-04 NOTE — PLAN OF CARE
[facility]  Discharge Final Note    Primary Care Provider: Jennifer Fernando NP    Expected Discharge Date: 9/27/2023    Final Discharge Note (most recent)       Final Note - 02/04/24 1446          Final Note    Assessment Type Final Discharge Note     Anticipated Discharge Disposition Home or Self Care     What phone number can be called within the next 1-3 days to see how you are doing after discharge? [phone]     Hospital Resources/Appts/Education Provided Provided patient/caregiver with written discharge plan information        Post-Acute Status    Discharge Delays None known at this time                     Important Message from Medicare             Contact Info       [facility]   Specialty: Home Health Services    [address]  [address]  [place] [address]   Phone: [phone]       Next Steps: Follow up    [facility]   Specialty: Wound Care    [address], [address]  [place] [address]   Phone: [phone]       Next Steps: Go on 10/10/2023    Instructions: Appointment with [facility] on on Tuesday, October 10, 2023 @ 2:30 pm.  Per Delicia Robison RN.    Jennifer Fernando NP   Specialty: Family Medicine   Relationship: PCP - General    [address] [place] [id number]   Phone: [phone]       Next Steps: Go on 10/5/2023    Instructions: Follow up appointment with Jennifer Fernando NP on Thursday, October 5, 2023 @ 2:00 pm.  Spoke to Megha, the notes and discharge summary need to be faxed upon dischrage.

## 2024-02-05 NOTE — PATIENT INSTRUCTIONS
Sacrum,- Cleanse with Vashe - pat dry, apply skin prep to periwound, allow to dry, apply Santyl to areas of non viable tissue,  topical antibiotic compound to open areas (Both Bactrim and Zosyn received from [facility] - follow instructions on Prescription received for application. Cover with vashe moistened gauze , gauze, abd pad or Mextra Superabsorbant dressing, secure with  medfix tape   Change daily and prn soilage      R heel -  cleanse with wound cleanser, apply adaptic open area,Cover with Aquacel,  apply betadine to bruising on Right foot plantar lateral aspect, cover with gauze, abd, wrap lightly with kerlix, secure with tape, change every other day     Left anterior foot: cleanse with wound cleanser, Apply adaptic then Aquacel,  cover with gauze, wrap lightly with kerlix, secure with tape, change every other day.           No wound care by wound care center to Left hip or R posterior thigh, sites are being grafted by [facility]

## 2024-02-06 ENCOUNTER — HOSPITAL ENCOUNTER (OUTPATIENT)
Dept: WOUND CARE | Facility: HOSPITAL | Age: 59
Discharge: HOME | End: 2024-02-06
Attending: STUDENT IN AN ORGANIZED HEALTH CARE EDUCATION/TRAINING PROGRAM
Payer: MEDICARE

## 2024-02-06 VITALS
SYSTOLIC BLOOD PRESSURE: 133 MMHG | TEMPERATURE: 98 F | RESPIRATION RATE: 16 BRPM | HEART RATE: 106 BPM | OXYGEN SATURATION: 97 % | DIASTOLIC BLOOD PRESSURE: 80 MMHG

## 2024-02-06 DIAGNOSIS — L89.613 PRESSURE INJURY OF RIGHT HEEL, STAGE 3: Primary | ICD-10-CM

## 2024-02-06 DIAGNOSIS — L89.523 PRESSURE INJURY OF LEFT ANKLE, STAGE 3: ICD-10-CM

## 2024-02-06 DIAGNOSIS — L89.154 PRESSURE INJURY OF SACRAL REGION, STAGE 4: ICD-10-CM

## 2024-02-06 PROCEDURE — 99213 OFFICE O/P EST LOW 20 MIN: CPT

## 2024-02-06 PROCEDURE — 11043 DBRDMT MUSC&/FSCA 1ST 20/<: CPT

## 2024-02-06 PROCEDURE — 99499 UNLISTED E&M SERVICE: CPT | Mod: ,,, | Performed by: PEDIATRICS

## 2024-02-06 PROCEDURE — 11046 DBRDMT MUSC&/FSCA EA ADDL: CPT

## 2024-02-06 PROCEDURE — 11046 DBRDMT MUSC&/FSCA EA ADDL: CPT | Mod: ,,, | Performed by: PEDIATRICS

## 2024-02-06 PROCEDURE — 11043 DBRDMT MUSC&/FSCA 1ST 20/<: CPT | Mod: ,,, | Performed by: PEDIATRICS

## 2024-02-06 PROCEDURE — 27000999 HC MEDICAL RECORD PHOTO DOCUMENTATION

## 2024-02-06 NOTE — PROGRESS NOTES
Subjective:       Patient ID: Antonio Galvan II is a 58 y.o. male.    Chief Complaint: Pressure Ulcer (R heel, L anterior ankle, sacral pressure ulcers.   Here for re-evaluation and treatment with md.)    HPI  Review of Systems      Objective:      Temp:  [97.7 °F (36.5 °C)]   Pulse:  [106]   Resp:  [16]   BP: (133)/(80)   SpO2:  [97 %]   Physical Exam       Altered Skin Integrity 05/06/22 1140 Right Heel  Full thickness tissue loss. Subcutaneous fat may be visible but bone, tendon or muscle are not exposed (Active)   05/06/22 1140   Altered Skin Integrity Present on Admission - Did Patient arrive to the hospital with altered skin?: yes   Side: Right   Orientation:    Location: Heel   Wound Number:    Is this injury device related?: No   Primary Wound Type:    Description of Altered Skin Integrity: Full thickness tissue loss. Subcutaneous fat may be visible but bone, tendon or muscle are not exposed   Ankle-Brachial Index:    Pulses:    Removal Indication and Assessment:    Wound Outcome:    (Retired) Wound Length (cm):    (Retired) Wound Width (cm):    (Retired) Depth (cm):    Wound Description (Comments):    Removal Indications:    Wound Image   02/06/24 1452   Dressing Appearance Intact;Dried drainage 02/06/24 1452   Drainage Amount Moderate 02/06/24 1452   Drainage Characteristics/Odor Serosanguineous;No odor;Bleeding controlled 02/06/24 1452   Appearance Red;Granulating 02/06/24 1452   Tissue loss description Full thickness 02/06/24 1452   Red (%), Wound Tissue Color 100 % 02/06/24 1452   Periwound Area Scar tissue;Dry;Other (see comments) 02/06/24 1452   Wound Edges Defined 02/06/24 1452   Wound Length (cm) 2 cm 02/06/24 1452   Wound Width (cm) 1.3 cm 02/06/24 1452   Wound Depth (cm) 0.1 cm 02/06/24 1452   Wound Volume (cm^3) 0.26 cm^3 02/06/24 1452   Wound Surface Area (cm^2) 2.6 cm^2 02/06/24 1452   Care Cleansed with:;Antimicrobial agent 02/06/24 1452   Dressing  Applied;Non-adherent;Hydrofiber;Gauze;Rolled gauze 02/06/24 1452            Altered Skin Integrity 01/12/23 1530 Sacral spine Full thickness tissue loss with exposed bone, tendon, or muscle. Often includes undermining and tunneling. May extend into muscle and/or supporting structures. (Active)   01/12/23 1530   Altered Skin Integrity Present on Admission - Did Patient arrive to the hospital with altered skin?: yes   Side:    Orientation:    Location: Sacral spine   Wound Number:    Is this injury device related?:    Primary Wound Type:    Description of Altered Skin Integrity: Full thickness tissue loss with exposed bone, tendon, or muscle. Often includes undermining and tunneling. May extend into muscle and/or supporting structures.   Ankle-Brachial Index:    Pulses:    Removal Indication and Assessment:    Wound Outcome:    (Retired) Wound Length (cm):    (Retired) Wound Width (cm):    (Retired) Depth (cm):    Wound Description (Comments):    Removal Indications:    Wound Image   02/06/24 1452   Dressing Appearance Intact;Moist drainage 02/06/24 1452   Drainage Amount Large 02/06/24 1452   Drainage Characteristics/Odor Creamy;Tan;No odor 02/06/24 1452   Appearance Red;Granulating;Tan;Yellow;Necrotic;Slough;Eschar 02/06/24 1452   Tissue loss description Full thickness 02/06/24 1452   Red (%), Wound Tissue Color 45 % 02/06/24 1452   Yellow (%), Wound Tissue Color 55 % 02/06/24 1452   Periwound Area Denuded;Excoriated 02/06/24 1452   Wound Edges Irregular 02/06/24 1452   Wound Length (cm) 7.5 cm 02/06/24 1452   Wound Width (cm) 8 cm 02/06/24 1452   Wound Depth (cm) 1.5 cm 02/06/24 1452   Wound Volume (cm^3) 90 cm^3 02/06/24 1452   Wound Surface Area (cm^2) 60 cm^2 02/06/24 1452   Care Cleansed with:;Antimicrobial agent;Debrided 02/06/24 1452   Dressing Applied;Gauze;Absorptive Pad 02/06/24 1452   Periwound Care Skin barrier film applied 02/06/24 1452            Altered Skin Integrity 08/01/23 1535 Left anterior Ankle  Other (comment) Full thickness tissue loss. Base is covered by slough and/or eschar in the wound bed (Active)   08/01/23 0725   Altered Skin Integrity Present on Admission - Did Patient arrive to the hospital with altered skin?: yes   Side: Left   Orientation: anterior   Location: Ankle   Wound Number:    Is this injury device related?: No   Primary Wound Type: Other   Description of Altered Skin Integrity: Full thickness tissue loss. Base is covered by slough and/or eschar in the wound bed   Ankle-Brachial Index:    Pulses: 2+ palpable and strong doppler pulses per MD   Removal Indication and Assessment:    Wound Outcome:    (Retired) Wound Length (cm):    (Retired) Wound Width (cm):    (Retired) Depth (cm):    Wound Description (Comments):    Removal Indications:    Wound Image   02/06/24 1452   Dressing Appearance Intact;Dried drainage 02/06/24 1452   Drainage Amount Small 02/06/24 1452   Drainage Characteristics/Odor Serosanguineous;No odor;Bleeding controlled 02/06/24 1452   Appearance Red;Granulating 02/06/24 1452   Tissue loss description Full thickness 02/06/24 1452   Red (%), Wound Tissue Color 100 % 02/06/24 1452   Periwound Area Purple;Maroon;Scar tissue 02/06/24 1452   Wound Edges Defined 02/06/24 1452   Wound Length (cm) 0.6 cm 02/06/24 1452   Wound Width (cm) 0.5 cm 02/06/24 1452   Wound Depth (cm) 0.1 cm 02/06/24 1452   Wound Volume (cm^3) 0.03 cm^3 02/06/24 1452   Wound Surface Area (cm^2) 0.3 cm^2 02/06/24 1452   Care Cleansed with:;Antimicrobial agent 02/06/24 1452   Dressing Applied;Non-adherent;Hydrofiber;Gauze;Rolled gauze 02/06/24 1452   Periwound Care Skin barrier film applied 02/06/24 1452         Assessment:     Today right heel is red and granulating.  Much smaller.  Area was cleaned and covered with Aquacel.  Dressings will be changed every other day.  Left anterior foot wound is also much smaller with good granulation tissue.  Aquacel was applied and was covered with gauze.  Sacrum today  is 0.5 cm x 8 cm.  Tissue is still necrotic but there is some beginning granulation tissue.  Because of this the area was cleaned and a selective debridement was performed.  See procedure note.  This will be changed daily.  Patient to return in 2 weeks for MD visit.    ICD-10-CM ICD-9-CM   1. Pressure injury of right heel, stage 3  L89.613 707.07     707.23   2. Pressure injury of sacral region, stage 4  L89.154 707.03     707.24   3. Pressure injury of left ankle, stage 3  L89.523 707.06     707.23         Plan:   Tissue pathology and/or culture taken:  [] Yes [x] No   Sharp debridement performed:   [] Yes [] No   Labs ordered this visit:   [] Yes [x] No   Imaging ordered this visit:   [] Yes [x] No           Orders Placed This Encounter   Procedures    Change dressing     Sacrum,- Cleanse with Vashe - pat dry, apply skin prep to periwound, allow to dry, apply Santyl to areas of non viable tissue,  topical antibiotic compound to open areas (Both Bactrim and Zosyn received from [facility] - follow instructions on Prescription received for application. Cover with vashe moistened gauze , gauze, abd pad or Mextra Superabsorbant dressing, secure with  medfix tape   Change daily and prn soilage      R heel -  cleanse with wound cleanser, apply adaptic open area,Cover with Aquacel,  apply betadine to bruising on Right foot plantar lateral aspect, cover with gauze, abd, wrap lightly with kerlix, secure with tape, change every other day     Left anterior foot: cleanse with wound cleanser, Apply adaptic then Aquacel,  cover with gauze, wrap lightly with kerlix, secure with tape, change every other day.           No wound care by wound care center to Left hip or R posterior thigh, sites are being grafted by [facility]        Follow up in about 2 weeks (around 2/20/2024) for md visit .

## 2024-02-06 NOTE — PROCEDURES
"Debridement    Date/Time: 2/6/2024 2:03 PM    Performed by: Ronald Barcenas MD  Authorized by: Ronald Barcenas MD  Associated wounds:        Altered Skin Integrity 01/12/23 1530 Sacral spine Full thickness tissue loss with exposed bone, tendon, or muscle. Often includes undermining and tunneling. May extend into muscle and/or supporting structures.  Time out: Immediately prior to procedure a "time out" was called to verify the correct patient, procedure, equipment, support staff and site/side marked as required.    Consent Done?:  Yes (Written)    Preparation: Patient was prepped and draped with clean technique    Local anesthesia used?: No      Wound Details:    Location:  Sacrum    Type of Debridement:  Non-excisional       Length (cm):  7.5       Area (sq cm):  60       Width (cm):  8       Percent Debrided (%):  55       Depth (cm):  1.5       Total Area Debrided (sq cm):  33    Depth of debridement:  Muscle/fascia/tendon    Devitalized tissue debrided:  Necrotic/Eschar, Slough and Fibrin    Instruments:  Curette  Bleeding:  Minimal  Hemostasis Achieved: Yes  Method Used:  Pressure  Patient tolerance:  Patient tolerated the procedure well with no immediate complications    "

## 2024-02-20 ENCOUNTER — HOSPITAL ENCOUNTER (OUTPATIENT)
Dept: WOUND CARE | Facility: HOSPITAL | Age: 59
Discharge: HOME | End: 2024-02-20
Attending: STUDENT IN AN ORGANIZED HEALTH CARE EDUCATION/TRAINING PROGRAM
Payer: MEDICARE

## 2024-02-20 VITALS
SYSTOLIC BLOOD PRESSURE: 117 MMHG | HEART RATE: 108 BPM | RESPIRATION RATE: 18 BRPM | OXYGEN SATURATION: 99 % | DIASTOLIC BLOOD PRESSURE: 75 MMHG | TEMPERATURE: 98 F

## 2024-02-20 DIAGNOSIS — L89.314 PRESSURE INJURY OF RIGHT ISCHIUM, STAGE 4: ICD-10-CM

## 2024-02-20 DIAGNOSIS — L89.613 PRESSURE INJURY OF RIGHT HEEL, STAGE 3: Primary | ICD-10-CM

## 2024-02-20 DIAGNOSIS — E11.65 UNCONTROLLED TYPE 2 DIABETES MELLITUS WITH HYPERGLYCEMIA: ICD-10-CM

## 2024-02-20 DIAGNOSIS — L89.523 PRESSURE INJURY OF LEFT ANKLE, STAGE 3: ICD-10-CM

## 2024-02-20 DIAGNOSIS — L89.154 PRESSURE INJURY OF SACRAL REGION, STAGE 4: ICD-10-CM

## 2024-02-20 DIAGNOSIS — S71.002S OPEN WOUND OF LEFT HIP, SEQUELA: ICD-10-CM

## 2024-02-20 PROCEDURE — 87077 CULTURE AEROBIC IDENTIFY: CPT | Mod: 91

## 2024-02-20 PROCEDURE — 99213 OFFICE O/P EST LOW 20 MIN: CPT | Mod: ,,, | Performed by: PEDIATRICS

## 2024-02-20 PROCEDURE — 27000999 HC MEDICAL RECORD PHOTO DOCUMENTATION

## 2024-02-20 PROCEDURE — 99214 OFFICE O/P EST MOD 30 MIN: CPT

## 2024-02-20 RX ORDER — DOXYCYCLINE 100 MG/1
100 CAPSULE ORAL 2 TIMES DAILY
Qty: 20 CAPSULE | Refills: 0 | Status: ON HOLD | OUTPATIENT
Start: 2024-02-20 | End: 2024-04-09 | Stop reason: HOSPADM

## 2024-02-20 NOTE — PATIENT INSTRUCTIONS
Sacrum,- Cleanse with Vashe - pat dry, apply skin prep to periwound, allow to dry, apply Santyl to areas of non viable tissue,  topical antibiotic compound to open areas (Both Bactrim and Zosyn received from professional [facility] - follow instructions on Prescription received for application. Cover with vashe moistened gauze , gauze, abd pad or Mextra Superabsorbant dressing, secure with  medfix tape   Change daily and prn soilage      R and L heel -  cleanse with wound cleanser, apply adaptic open area,Cover with Aquacel, cover with gauze, abd, wrap lightly with kerlix, secure with tape, change every day     Left anterior foot: cleanse with wound cleanser, Apply adaptic then Aquacel,  cover with gauze, wrap lightly with kerlix, secure with tape, change every day.          R hip - cleanse with vashe, pat dry, apply skin prep to periwound, allow to dry, pack with vashe   Moistened mesalt to undermining from 12 to 2 o'clock, apply rocky to flattened open wound area.  Cover with gauze, mextra superabsorbant dressing, secure with medfix tape. Change daily    L hip- cleanse with vashe, pat dry, apply skin prep to periwound, allow to dry, pack with vashe   Moistened mesalt including undermining from 12 to 4 o'clock and 9 to 11 o'clock.  Cover with gauze, mextra superabsorbant dressing, secure with medfix tape. Change daily    Follow up in 1 week    Surgery at [facility] with Dr. Jean on March 5th

## 2024-02-20 NOTE — PROGRESS NOTES
Subjective:       Patient ID: Antonio Galvan II is a 58 y.o. male.    Chief Complaint: Non-healing Wound Follow Up (R heel, L anterior ankle, sacral pressure ulcers.   Here for re-evaluation and treatment with md)    HPI patient today because of completion of skin substitute for the right and left hip.  Also evaluation of sacral wound and wounds of both heels and dorsum of left foot.  Review of Systems negative except as above.      Objective:      Temp:  [97.9 °F (36.6 °C)]   Pulse:  [108]   Resp:  [18]   BP: (117)/(75)   SpO2:  [99 %]   Physical Exam  Constitutional:       Appearance: He is normal weight.   HENT:      Head: Normocephalic and atraumatic.      Right Ear: Ear canal and external ear normal.      Left Ear: Ear canal and external ear normal.      Mouth/Throat:      Mouth: Mucous membranes are moist.      Pharynx: Oropharynx is clear.   Eyes:      Extraocular Movements: Extraocular movements intact.      Pupils: Pupils are equal, round, and reactive to light.   Cardiovascular:      Rate and Rhythm: Normal rate and regular rhythm.      Pulses: Normal pulses.      Heart sounds: Normal heart sounds.   Pulmonary:      Effort: Pulmonary effort is normal.      Breath sounds: Normal breath sounds.   Abdominal:      General: Abdomen is flat.      Palpations: Abdomen is soft.   Musculoskeletal:      Cervical back: Normal range of motion.   Skin:     Findings: Lesion present.      Comments: See skin exam below.   Neurological:      Mental Status: He is alert.      Comments: Patient is paraplegic has multiple contractures.   Psychiatric:         Mood and Affect: Mood normal.         Behavior: Behavior normal.            Altered Skin Integrity 05/06/22 1140 Right Heel  Full thickness tissue loss. Subcutaneous fat may be visible but bone, tendon or muscle are not exposed (Active)   05/06/22 1140   Altered Skin Integrity Present on Admission - Did Patient arrive to the hospital with altered skin?: yes   Side:  Right   Orientation:    Location: Heel   Wound Number:    Is this injury device related?: No   Primary Wound Type:    Description of Altered Skin Integrity: Full thickness tissue loss. Subcutaneous fat may be visible but bone, tendon or muscle are not exposed   Ankle-Brachial Index:    Pulses:    Removal Indication and Assessment:    Wound Outcome:    (Retired) Wound Length (cm):    (Retired) Wound Width (cm):    (Retired) Depth (cm):    Wound Description (Comments):    Removal Indications:    Wound Image   02/20/24 1529   Dressing Appearance Intact;Dried drainage 02/20/24 1529   Drainage Amount Moderate 02/20/24 1529   Drainage Characteristics/Odor Serosanguineous 02/20/24 1529   Appearance Red;Granulating 02/20/24 1529   Tissue loss description Full thickness 02/20/24 1529   Red (%), Wound Tissue Color 100 % 02/20/24 1529   Periwound Area Scar tissue;Dry;Other (see comments) 02/20/24 1529   Wound Edges Defined 02/20/24 1529   Wound Length (cm) 1.6 cm 02/20/24 1529   Wound Width (cm) 0.9 cm 02/20/24 1529   Wound Depth (cm) 0.1 cm 02/20/24 1529   Wound Volume (cm^3) 0.144 cm^3 02/20/24 1529   Wound Surface Area (cm^2) 1.44 cm^2 02/20/24 1529   Care Cleansed with:;Antimicrobial agent 02/20/24 1529   Dressing Applied;Non-adherent;Hydrofiber;Gauze;Rolled gauze 02/20/24 1529   Periwound Care Skin barrier film applied 02/20/24 1529            Altered Skin Integrity 01/12/23 1530 Sacral spine Full thickness tissue loss with exposed bone, tendon, or muscle. Often includes undermining and tunneling. May extend into muscle and/or supporting structures. (Active)   01/12/23 1530   Altered Skin Integrity Present on Admission - Did Patient arrive to the hospital with altered skin?: yes   Side:    Orientation:    Location: Sacral spine   Wound Number:    Is this injury device related?:    Primary Wound Type:    Description of Altered Skin Integrity: Full thickness tissue loss with exposed bone, tendon, or muscle. Often includes  undermining and tunneling. May extend into muscle and/or supporting structures.   Ankle-Brachial Index:    Pulses:    Removal Indication and Assessment:    Wound Outcome:    (Retired) Wound Length (cm):    (Retired) Wound Width (cm):    (Retired) Depth (cm):    Wound Description (Comments):    Removal Indications:    Wound Image   02/20/24 1529   Dressing Appearance Intact;Moist drainage 02/20/24 1529   Drainage Amount Moderate 02/20/24 1529   Drainage Characteristics/Odor Creamy;Tan;No odor 02/20/24 1529   Appearance Red;Granulating 02/20/24 1529   Tissue loss description Full thickness 02/20/24 1529   Red (%), Wound Tissue Color 45 % 02/20/24 1529   Yellow (%), Wound Tissue Color 55 % 02/20/24 1529   Periwound Area Denuded;Excoriated 02/20/24 1529   Wound Edges Irregular 02/20/24 1529   Wound Length (cm) 8 cm 02/20/24 1529   Wound Width (cm) 9.5 cm 02/20/24 1529   Wound Depth (cm) 1.8 cm 02/20/24 1529   Wound Volume (cm^3) 136.8 cm^3 02/20/24 1529   Wound Surface Area (cm^2) 76 cm^2 02/20/24 1529   Care Cleansed with:;Antimicrobial agent 02/20/24 1529   Dressing Applied;Gauze;Absorptive Pad 02/20/24 1529   Periwound Care Skin barrier film applied 02/20/24 1529            Altered Skin Integrity 08/01/23 1534 Left posterior Ankle Full thickness tissue loss. Base is covered by slough and/or eschar in the wound bed (Active)   08/01/23 1534   Altered Skin Integrity Present on Admission - Did Patient arrive to the hospital with altered skin?: yes   Side: Left   Orientation: posterior   Location: Ankle   Wound Number:    Is this injury device related?:    Primary Wound Type:    Description of Altered Skin Integrity: Full thickness tissue loss. Base is covered by slough and/or eschar in the wound bed   Ankle-Brachial Index:    Pulses: 2 + palpable, strong doppler signal per md   Removal Indication and Assessment: closed/resurfaced   Wound Outcome:    (Retired) Wound Length (cm):    (Retired) Wound Width (cm):    (Retired)  Depth (cm):    Wound Description (Comments):    Removal Indications:    Wound Image   02/20/24 1557   Description of Altered Skin Integrity Partial thickness tissue loss. Shallow open ulcer with a red or pink wound bed, without slough. Intact or Open/Ruptured Serum-filled blister. 02/20/24 1557   Dressing Appearance Moist drainage 02/20/24 1557   Drainage Amount Small 02/20/24 1557   Drainage Characteristics/Odor Serosanguineous 02/20/24 1557   Appearance Maroon;Red;Ecchymotic 02/20/24 1557   Tissue loss description Partial thickness 02/20/24 1557   Red (%), Wound Tissue Color 100 % 02/20/24 1557   Periwound Area [place];Redness 02/20/24 1557   Wound Edges Defined 02/20/24 1557   Wound Length (cm) 2.3 cm 02/20/24 1557   Wound Width (cm) 1.2 cm 02/20/24 1557   Wound Depth (cm) 0.1 cm 02/20/24 1557   Wound Volume (cm^3) 0.276 cm^3 02/20/24 1557   Wound Surface Area (cm^2) 2.76 cm^2 02/20/24 1557   Care Cleansed with:;Antimicrobial agent 02/20/24 1557   Dressing Applied;Non-adherent;Hydrofiber;Gauze;Rolled gauze 02/20/24 1557            Altered Skin Integrity 08/01/23 1535 Left anterior Ankle Other (comment) Full thickness tissue loss. Base is covered by slough and/or eschar in the wound bed (Active)   08/01/23 1535   Altered Skin Integrity Present on Admission - Did Patient arrive to the hospital with altered skin?: yes   Side: Left   Orientation: anterior   Location: Ankle   Wound Number:    Is this injury device related?: No   Primary Wound Type: Other   Description of Altered Skin Integrity: Full thickness tissue loss. Base is covered by slough and/or eschar in the wound bed   Ankle-Brachial Index:    Pulses: 2+ palpable and strong doppler pulses per MD   Removal Indication and Assessment:    Wound Outcome:    (Retired) Wound Length (cm):    (Retired) Wound Width (cm):    (Retired) Depth (cm):    Wound Description (Comments):    Removal Indications:    Wound Image   02/20/24 1529   Dressing Appearance Intact;Dried  drainage 02/20/24 1529   Drainage Amount Small 02/20/24 1529   Drainage Characteristics/Odor Serosanguineous;No odor;Bleeding controlled 02/20/24 1529   Appearance Red;Granulating 02/20/24 1529   Tissue loss description Full thickness 02/20/24 1529   Red (%), Wound Tissue Color 100 % 02/20/24 1529   Periwound Area Maroon;Redness;Scar tissue 02/20/24 1529   Wound Edges Defined 02/20/24 1529   Wound Length (cm) 1.2 cm 02/20/24 1529   Wound Width (cm) 0.7 cm 02/20/24 1529   Wound Depth (cm) 0.1 cm 02/20/24 1529   Wound Volume (cm^3) 0.084 cm^3 02/20/24 1529   Wound Surface Area (cm^2) 0.84 cm^2 02/20/24 1529   Care Cleansed with:;Antimicrobial agent 02/20/24 1529   Dressing Applied;Non-adherent;Hydrofiber;Gauze;Rolled gauze 02/20/24 1529            Incision/Site 08/16/23 2011 Right Hip posterior (Active)   08/16/23 2011   Present Prior to Hospital Arrival?:    Side: Right   Location: Hip   Orientation: posterior   Incision Type:    Closure Method:    Additional Comments:    Removal Indication and Assessment:    Wound Outcome:    Removal Indications:    Wound Image     02/20/24 1529   Dressing Appearance Moist drainage 02/20/24 1529   Drainage Amount Moderate 02/20/24 1529   Drainage Characteristics/Odor Serosanguineous;No odor;Bleeding controlled 02/20/24 1529   Appearance Pink;Red;Slough;Moist 02/20/24 1529   Red (%), Wound Tissue Color 100 % 02/20/24 1529   Periwound Area [place];Moist 02/20/24 1529   Wound Edges Defined;Rolled/closed 02/20/24 1529   Wound Length (cm) 9.3 cm 02/20/24 1529   Wound Width (cm) 12.2 cm 02/20/24 1529   Wound Depth (cm) 1.5 cm 02/20/24 1529   Wound Volume (cm^3) 170.19 cm^3 02/20/24 1529   Wound Surface Area (cm^2) 113.46 cm^2 02/20/24 1529   Undermining (depth (cm)/location) 12-2; 3cm at 12:00 02/20/24 1529   Care Cleansed with:;Antimicrobial agent 02/20/24 1529   Dressing Applied;Sodium chloride impregnated;Gauze;Absorptive Pad 02/20/24 1529            Incision/Site 08/16/23 2011 Left Hip  lateral (Active)   08/16/23 2011   Present Prior to Hospital Arrival?:    Side: Left   Location: Hip   Orientation: lateral   Incision Type:    Closure Method:    Additional Comments:    Removal Indication and Assessment:    Wound Outcome:    Removal Indications:    Wound Image   02/20/24 1529   Dressing Appearance Moist drainage 02/20/24 1529   Drainage Amount Moderate 02/20/24 1529   Drainage Characteristics/Odor Green;Yellow;Bleeding controlled 02/20/24 1529   Appearance Pink;Red;Tan;Moist 02/20/24 1529   Red (%), Wound Tissue Color 100 % 02/20/24 1529   Periwound Area Moist;Redness;Pink;Macerated 02/20/24 1529   Wound Edges Defined;Rolled/closed 02/20/24 1529   Wound Length (cm) 4 cm 02/20/24 1529   Wound Width (cm) 3 cm 02/20/24 1529   Wound Depth (cm) 1.5 cm 02/20/24 1529   Wound Volume (cm^3) 18 cm^3 02/20/24 1529   Wound Surface Area (cm^2) 12 cm^2 02/20/24 1529   Undermining (depth (cm)/location) 12-4 oclock; 3cm at 1 oclock; 2cm from 9-11 oclock 02/20/24 1529   Care Cleansed with:;Antimicrobial agent 02/20/24 1529   Dressing Applied;Sodium chloride impregnated;Gauze;Absorptive Pad;Other (comment) 02/20/24 1529         Assessment:     Today the wounds of both heels and dorsum of left foot appear to be granulating and red.  Aquacel was applied.  Aquacel was applied.  This was bandaged.  Today the sacral wound is still with necrotic tissue.  This was cleaned had topical antibiotic and Santyl was applied.  Right ischium wound is as above.  There is a greenish drainage was cleaned and a tissue sample was taken for culture.  Mesalt and Vashe was applied to the open area and collagen to the rest of the wound.  Greenish drainage was also present on the left hip wound and again this was cleaned with Vashe and Vashe moistened Mesalt was applied.  This was covered with gauze also.  Dressings will be changed daily.  Dr. Jean saw the patient today and will take the patient to surgery as an outpatient on March 5th  2024 do extensive debridement.  Also requests that we acquire a wound VAC for the sacral area.  Also we feel that this time that further skin substitute for the other wounds.  Patient was started on doxycycline.  Patient will return in 1 week for physician visit.    ICD-10-CM ICD-9-CM   1. Pressure injury of right heel, stage 3  L89.613 707.07     707.23   2. Pressure injury of sacral region, stage 4  L89.154 707.03     707.24   3. Pressure injury of left ankle, stage 3  L89.523 707.06     707.23   4. Pressure injury of right ischium, stage 4  L89.314 707.05     707.24   5. Open wound of left hip, sequela  S71.002S 906.1   6. Uncontrolled type 2 diabetes mellitus with hyperglycemia  E11.65 250.02         Plan:   Tissue pathology and/or culture taken:  [x] Yes [] No   Sharp debridement performed:   [] Yes [x] No   Labs ordered this visit:   [] Yes [x] No   Imaging ordered this visit:   [] Yes [x] No           Orders Placed This Encounter   Procedures    Tissue Culture - Aerobic    Prealbumin     Standing Status:   Future     Standing Expiration Date:   4/20/2025    Change dressing     Sacrum,- Cleanse with Vashe - pat dry, apply skin prep to periwound, allow to dry, apply Santyl to areas of non viable tissue,  topical antibiotic compound to open areas (Both Bactrim and Zosyn received from [facility] - follow instructions on Prescription received for application. Cover with vashe moistened gauze , gauze, abd pad or Mextra Superabsorbant dressing, secure with  medfix tape   Change daily and prn soilage      R and L heel -  cleanse with wound cleanser, apply adaptic open area,Cover with Aquacel, cover with gauze, abd, wrap lightly with kerlix, secure with tape, change every day     Left anterior foot: cleanse with wound cleanser, Apply adaptic then Aquacel,  cover with gauze, wrap lightly with kerlix, secure with tape, change every day.        R hip - cleanse with vashe, pat dry, apply skin prep to  periwound, allow to dry, pack with vashe   Moistened mesalt to undermining from 12 to 2 o'clock, apply rocky to flattened open wound area.  Cover with gauze, mextra superabsorbant dressing, secure with medfix tape. Change daily    L hip- cleanse with vashe, pat dry, apply skin prep to periwound, allow to dry, pack with vashe   Moistened mesalt including undermining from 12 to 4 o'clock and 9 to 11 o'clock.  Cover with gauze, mextra superabsorbant dressing, secure with medfix tape. Change daily    Follow up in 1 week    Surgery at [facility] with Dr. Jean on March 5th        Follow up in about 1 week (around 2/27/2024) for md visit .

## 2024-02-21 NOTE — H&P
Expand All Collapse All       Subjective:         Subjective   Patient ID: Antonio Galvan II is a 58 y.o. male.     Chief Complaint: Non-healing Wound Follow Up (R heel, L anterior ankle, sacral pressure ulcers.   Here for re-evaluation and treatment with md)     HPI patient today because of completion of skin substitute for the right and left hip.  Also evaluation of sacral wound and wounds of both heels and dorsum of left foot.  Review of Systems negative except as above.        Objective:      Objective   Temp:  [97.9 °F (36.6 °C)]   Pulse:  [108]   Resp:  [18]   BP: (117)/(75)   SpO2:  [99 %]   Physical Exam  Constitutional:       Appearance: He is normal weight.   HENT:      Head: Normocephalic and atraumatic.      Right Ear: Ear canal and external ear normal.      Left Ear: Ear canal and external ear normal.      Mouth/Throat:      Mouth: Mucous membranes are moist.      Pharynx: Oropharynx is clear.   Eyes:      Extraocular Movements: Extraocular movements intact.      Pupils: Pupils are equal, round, and reactive to light.   Cardiovascular:      Rate and Rhythm: Normal rate and regular rhythm.      Pulses: Normal pulses.      Heart sounds: Normal heart sounds.   Pulmonary:      Effort: Pulmonary effort is normal.      Breath sounds: Normal breath sounds.   Abdominal:      General: Abdomen is flat.      Palpations: Abdomen is soft.   Musculoskeletal:      Cervical back: Normal range of motion.   Skin:     Findings: Lesion present.      Comments: See skin exam below.   Neurological:      Mental Status: He is alert.      Comments: Patient is paraplegic has multiple contractures.   Psychiatric:         Mood and Affect: Mood normal.         Behavior: Behavior normal.                   Altered Skin Integrity 05/06/22 1140 Right Heel  Full thickness tissue loss. Subcutaneous fat may be visible but bone, tendon or muscle are not exposed (Active)   05/06/22 1140   Altered Skin Integrity Present on Admission -  Did Patient arrive to the hospital with altered skin?: yes   Side: Right   Orientation:    Location: Heel   Wound Number:    Is this injury device related?: No   Primary Wound Type:    Description of Altered Skin Integrity: Full thickness tissue loss. Subcutaneous fat may be visible but bone, tendon or muscle are not exposed   Ankle-Brachial Index:    Pulses:    Removal Indication and Assessment:    Wound Outcome:    (Retired) Wound Length (cm):    (Retired) Wound Width (cm):    (Retired) Depth (cm):    Wound Description (Comments):    Removal Indications:    Wound Image   02/20/24 1529   Dressing Appearance Intact;Dried drainage 02/20/24 1529   Drainage Amount Moderate 02/20/24 1529   Drainage Characteristics/Odor Serosanguineous 02/20/24 1529   Appearance Red;Granulating 02/20/24 1529   Tissue loss description Full thickness 02/20/24 1529   Red (%), Wound Tissue Color 100 % 02/20/24 1529   Periwound Area Scar tissue;Dry;Other (see comments) 02/20/24 1529   Wound Edges Defined 02/20/24 1529   Wound Length (cm) 1.6 cm 02/20/24 1529   Wound Width (cm) 0.9 cm 02/20/24 1529   Wound Depth (cm) 0.1 cm 02/20/24 1529   Wound Volume (cm^3) 0.144 cm^3 02/20/24 1529   Wound Surface Area (cm^2) 1.44 cm^2 02/20/24 1529   Care Cleansed with:;Antimicrobial agent 02/20/24 1529   Dressing Applied;Non-adherent;Hydrofiber;Gauze;Rolled gauze 02/20/24 1529   Periwound Care Skin barrier film applied 02/20/24 1529            Altered Skin Integrity 01/12/23 1530 Sacral spine Full thickness tissue loss with exposed bone, tendon, or muscle. Often includes undermining and tunneling. May extend into muscle and/or supporting structures. (Active)   01/12/23 1530   Altered Skin Integrity Present on Admission - Did Patient arrive to the hospital with altered skin?: yes   Side:    Orientation:    Location: Sacral spine   Wound Number:    Is this injury device related?:    Primary Wound Type:    Description of Altered Skin Integrity: Full thickness  tissue loss with exposed bone, tendon, or muscle. Often includes undermining and tunneling. May extend into muscle and/or supporting structures.   Ankle-Brachial Index:    Pulses:    Removal Indication and Assessment:    Wound Outcome:    (Retired) Wound Length (cm):    (Retired) Wound Width (cm):    (Retired) Depth (cm):    Wound Description (Comments):    Removal Indications:    Wound Image   02/20/24 1529   Dressing Appearance Intact;Moist drainage 02/20/24 1529   Drainage Amount Moderate 02/20/24 1529   Drainage Characteristics/Odor Creamy;Tan;No odor 02/20/24 1529   Appearance Red;Granulating 02/20/24 1529   Tissue loss description Full thickness 02/20/24 1529   Red (%), Wound Tissue Color 45 % 02/20/24 1529   Yellow (%), Wound Tissue Color 55 % 02/20/24 1529   Periwound Area Denuded;Excoriated 02/20/24 1529   Wound Edges Irregular 02/20/24 1529   Wound Length (cm) 8 cm 02/20/24 1529   Wound Width (cm) 9.5 cm 02/20/24 1529   Wound Depth (cm) 1.8 cm 02/20/24 1529   Wound Volume (cm^3) 136.8 cm^3 02/20/24 1529   Wound Surface Area (cm^2) 76 cm^2 02/20/24 1529   Care Cleansed with:;Antimicrobial agent 02/20/24 1529   Dressing Applied;Gauze;Absorptive Pad 02/20/24 1529   Periwound Care Skin barrier film applied 02/20/24 1529            Altered Skin Integrity 08/01/23 1534 Left posterior Ankle Full thickness tissue loss. Base is covered by slough and/or eschar in the wound bed (Active)   08/01/23 1534   Altered Skin Integrity Present on Admission - Did Patient arrive to the hospital with altered skin?: yes   Side: Left   Orientation: posterior   Location: Ankle   Wound Number:    Is this injury device related?:    Primary Wound Type:    Description of Altered Skin Integrity: Full thickness tissue loss. Base is covered by slough and/or eschar in the wound bed   Ankle-Brachial Index:    Pulses: 2 + palpable, strong doppler signal per md   Removal Indication and Assessment: closed/resurfaced   Wound Outcome:     (Retired) Wound Length (cm):    (Retired) Wound Width (cm):    (Retired) Depth (cm):    Wound Description (Comments):    Removal Indications:    Wound Image   02/20/24 1557   Description of Altered Skin Integrity Partial thickness tissue loss. Shallow open ulcer with a red or pink wound bed, without slough. Intact or Open/Ruptured Serum-filled blister. 02/20/24 1557   Dressing Appearance Moist drainage 02/20/24 1557   Drainage Amount Small 02/20/24 1557   Drainage Characteristics/Odor Serosanguineous 02/20/24 1557   Appearance Maroon;Red;Ecchymotic 02/20/24 1557   Tissue loss description Partial thickness 02/20/24 1557   Red (%), Wound Tissue Color 100 % 02/20/24 1557   Periwound Area Maroon;Redness 02/20/24 1557   Wound Edges Defined 02/20/24 1557   Wound Length (cm) 2.3 cm 02/20/24 1557   Wound Width (cm) 1.2 cm 02/20/24 1557   Wound Depth (cm) 0.1 cm 02/20/24 1557   Wound Volume (cm^3) 0.276 cm^3 02/20/24 1557   Wound Surface Area (cm^2) 2.76 cm^2 02/20/24 1557   Care Cleansed with:;Antimicrobial agent 02/20/24 1557   Dressing Applied;Non-adherent;Hydrofiber;Gauze;Rolled gauze 02/20/24 1557            Altered Skin Integrity 08/01/23 1535 Left anterior Ankle Other (comment) Full thickness tissue loss. Base is covered by slough and/or eschar in the wound bed (Active)   08/01/23 1535   Altered Skin Integrity Present on Admission - Did Patient arrive to the hospital with altered skin?: yes   Side: Left   Orientation: anterior   Location: Ankle   Wound Number:    Is this injury device related?: No   Primary Wound Type: Other   Description of Altered Skin Integrity: Full thickness tissue loss. Base is covered by slough and/or eschar in the wound bed   Ankle-Brachial Index:    Pulses: 2+ palpable and strong doppler pulses per MD   Removal Indication and Assessment:    Wound Outcome:    (Retired) Wound Length (cm):    (Retired) Wound Width (cm):    (Retired) Depth (cm):    Wound Description (Comments):    Removal  Indications:    Wound Image   02/20/24 1529   Dressing Appearance Intact;Dried drainage 02/20/24 1529   Drainage Amount Small 02/20/24 1529   Drainage Characteristics/Odor Serosanguineous;No odor;Bleeding controlled 02/20/24 1529   Appearance Red;Granulating 02/20/24 1529   Tissue loss description Full thickness 02/20/24 1529   Red (%), Wound Tissue Color 100 % 02/20/24 1529   Periwound Area Maroon;Redness;Scar tissue 02/20/24 1529   Wound Edges Defined 02/20/24 1529   Wound Length (cm) 1.2 cm 02/20/24 1529   Wound Width (cm) 0.7 cm 02/20/24 1529   Wound Depth (cm) 0.1 cm 02/20/24 1529   Wound Volume (cm^3) 0.084 cm^3 02/20/24 1529   Wound Surface Area (cm^2) 0.84 cm^2 02/20/24 1529   Care Cleansed with:;Antimicrobial agent 02/20/24 1529   Dressing Applied;Non-adherent;Hydrofiber;Gauze;Rolled gauze 02/20/24 1529            Incision/Site 08/16/23 2011 Right Hip posterior (Active)   08/16/23 2011   Present Prior to Hospital Arrival?:    Side: Right   Location: Hip   Orientation: posterior   Incision Type:    Closure Method:    Additional Comments:    Removal Indication and Assessment:    Wound Outcome:    Removal Indications:    Wound Image     02/20/24 1529   Dressing Appearance Moist drainage 02/20/24 1529   Drainage Amount Moderate 02/20/24 1529   Drainage Characteristics/Odor Serosanguineous;No odor;Bleeding controlled 02/20/24 1529   Appearance Pink;Red;Slough;Moist 02/20/24 1529   Red (%), Wound Tissue Color 100 % 02/20/24 1529   Periwound Area [place];Moist 02/20/24 1529   Wound Edges Defined;Rolled/closed 02/20/24 1529   Wound Length (cm) 9.3 cm 02/20/24 1529   Wound Width (cm) 12.2 cm 02/20/24 1529   Wound Depth (cm) 1.5 cm 02/20/24 1529   Wound Volume (cm^3) 170.19 cm^3 02/20/24 1529   Wound Surface Area (cm^2) 113.46 cm^2 02/20/24 1529   Undermining (depth (cm)/location) 12-2; 3cm at 12:00 02/20/24 1529   Care Cleansed with:;Antimicrobial agent 02/20/24 1529   Dressing Applied;Sodium chloride  impregnated;Gauze;Absorptive Pad 02/20/24 1529            Incision/Site 08/16/23 2011 Left Hip lateral (Active)   08/16/23 2011   Present Prior to Hospital Arrival?:    Side: Left   Location: Hip   Orientation: lateral   Incision Type:    Closure Method:    Additional Comments:    Removal Indication and Assessment:    Wound Outcome:    Removal Indications:    Wound Image   02/20/24 1529   Dressing Appearance Moist drainage 02/20/24 1529   Drainage Amount Moderate 02/20/24 1529   Drainage Characteristics/Odor Green;Yellow;Bleeding controlled 02/20/24 1529   Appearance Pink;Red;Tan;Moist 02/20/24 1529   Red (%), Wound Tissue Color 100 % 02/20/24 1529   Periwound Area Moist;Redness;Pink;Macerated 02/20/24 1529   Wound Edges Defined;Rolled/closed 02/20/24 1529   Wound Length (cm) 4 cm 02/20/24 1529   Wound Width (cm) 3 cm 02/20/24 1529   Wound Depth (cm) 1.5 cm 02/20/24 1529   Wound Volume (cm^3) 18 cm^3 02/20/24 1529   Wound Surface Area (cm^2) 12 cm^2 02/20/24 1529   Undermining (depth (cm)/location) 12-4 oclock; 3cm at 1 oclock; 2cm from 9-11 oclock 02/20/24 1529   Care Cleansed with:;Antimicrobial agent 02/20/24 1529   Dressing Applied;Sodium chloride impregnated;Gauze;Absorptive Pad;Other (comment) 02/20/24 1529            Assessment:      Assessment   Today the wounds of both heels and dorsum of left foot appear to be granulating and red.  Aquacel was applied.  Aquacel was applied.  This was bandaged.  Today the sacral wound is still with necrotic tissue.  This was cleaned had topical antibiotic and Santyl was applied.  Right ischium wound is as above.  There is a greenish drainage was cleaned and a tissue sample was taken for culture.  Mesalt and Vashe was applied to the open area and collagen to the rest of the wound.  Greenish drainage was also present on the left hip wound and again this was cleaned with Vashe and Vashe moistened Mesalt was applied.  This was covered with gauze also.  Dressings will be changed  daily.  Dr. Jean saw the patient today and will take the patient to surgery as an outpatient on March 5th 2024 do extensive debridement.  Also requests that we acquire a wound VAC for the sacral area.  Also we feel that this time that further skin substitute for the other wounds.  Patient was started on doxycycline.  Patient will return in 1 week for physician visit.      ICD-10-CM ICD-9-CM   1. Pressure injury of right heel, stage 3  L89.613 707.07       707.23   2. Pressure injury of sacral region, stage 4  L89.154 707.03       707.24   3. Pressure injury of left ankle, stage 3  L89.523 707.06       707.23   4. Pressure injury of right ischium, stage 4  L89.314 707.05       707.24   5. Open wound of left hip, sequela  S71.002S 906.1   6. Uncontrolled type 2 diabetes mellitus with hyperglycemia  E11.65 250.02            Plan:   Tissue pathology and/or culture taken:     [x] Yes [] No   Sharp debridement performed:                   [] Yes [x] No   Labs ordered this visit:                                [] Yes [x] No   Imaging ordered this visit:                           [] Yes [x] No            Plan         Orders Placed This Encounter   Procedures    Tissue Culture - Aerobic    Prealbumin       Standing Status:   Future       Standing Expiration Date:   4/20/2025    Change dressing       Sacrum,- Cleanse with Vashe - pat dry, apply skin prep to periwound, allow to dry, apply Santyl to areas of non viable tissue,  topical antibiotic compound to open areas (Both Bactrim and Zosyn received from [facility] - follow instructions on Prescription received for application. Cover with vashe moistened gauze , gauze, abd pad or Mextra Superabsorbant dressing, secure with  medfix tape   Change daily and prn soilage      R and L heel -  cleanse with wound cleanser, apply adaptic open area,Cover with Aquacel, cover with gauze, abd, wrap lightly with kerlix, secure with tape, change every day     Left  anterior foot: cleanse with wound cleanser, Apply adaptic then Aquacel,  cover with gauze, wrap lightly with kerlix, secure with tape, change every day.         R hip - cleanse with vashe, pat dry, apply skin prep to periwound, allow to dry, pack with vashe   Moistened mesalt to undermining from 12 to 2 o'clock, apply rocky to flattened open wound area.  Cover with gauze, mextra superabsorbant dressing, secure with medfix tape. Change daily     L hip- cleanse with vashe, pat dry, apply skin prep to periwound, allow to dry, pack with vashe   Moistened mesalt including undermining from 12 to 4 o'clock and 9 to 11 o'clock.  Cover with gauze, mextra superabsorbant dressing, secure with medfix tape. Change daily     Follow up in 1 week     Surgery at [facility] with Dr. Jean on March 5th         Follow up in about 1 week (around 2/27/2024) for md visit .

## 2024-02-21 NOTE — ADDENDUM NOTE
Encounter addended by: Ronald Barcenas MD on: 2/21/2024 3:11 PM   Actions taken: Clinical Note Signed

## 2024-02-23 ENCOUNTER — TELEPHONE (OUTPATIENT)
Dept: WOUND CARE | Facility: HOSPITAL | Age: 59
End: 2024-02-23
Payer: MEDICARE

## 2024-02-23 ENCOUNTER — LAB REQUISITION (OUTPATIENT)
Dept: LAB | Facility: HOSPITAL | Age: 59
End: 2024-02-23
Attending: PEDIATRICS
Payer: MEDICARE

## 2024-02-23 ENCOUNTER — HOSPITAL ENCOUNTER (INPATIENT)
Facility: HOSPITAL | Age: 59
LOS: 3 days | Discharge: NURSING, REHAB OR LONG-TERM CARE | DRG: 592 | End: 2024-02-26
Attending: EMERGENCY MEDICINE | Admitting: INTERNAL MEDICINE
Payer: MEDICARE

## 2024-02-23 DIAGNOSIS — T83.510A INFECTION AND INFLAMMATORY REACTION DUE TO CYSTOSTOMY CATHETER, INITIAL ENCOUNTER: ICD-10-CM

## 2024-02-23 DIAGNOSIS — L89.523 PRESSURE ULCER OF LEFT ANKLE, STAGE 3: ICD-10-CM

## 2024-02-23 DIAGNOSIS — D62 ACUTE POSTHEMORRHAGIC ANEMIA: ICD-10-CM

## 2024-02-23 DIAGNOSIS — S71.002S OPEN WOUND OF LEFT HIP, SEQUELA: ICD-10-CM

## 2024-02-23 DIAGNOSIS — L89.523 PRESSURE INJURY OF LEFT ANKLE, STAGE 3: ICD-10-CM

## 2024-02-23 DIAGNOSIS — E11.9 TYPE 2 DIABETES MELLITUS WITHOUT COMPLICATIONS: ICD-10-CM

## 2024-02-23 DIAGNOSIS — L89.314 PRESSURE INJURY OF RIGHT ISCHIUM, STAGE 4: ICD-10-CM

## 2024-02-23 DIAGNOSIS — L89.613 PRESSURE INJURY OF RIGHT HEEL, STAGE 3: ICD-10-CM

## 2024-02-23 DIAGNOSIS — L89.154 PRESSURE INJURY OF SACRAL REGION, STAGE 4: ICD-10-CM

## 2024-02-23 DIAGNOSIS — L08.9 INFECTED PRESSURE ULCER, UNSPECIFIED PRESSURE ULCER STAGE: Primary | ICD-10-CM

## 2024-02-23 DIAGNOSIS — L89.90 INFECTED PRESSURE ULCER, UNSPECIFIED PRESSURE ULCER STAGE: Primary | ICD-10-CM

## 2024-02-23 DIAGNOSIS — N99.520 HEMORRHAGE OF INCONTINENT EXTERNAL STOMA OF URINARY TRACT: ICD-10-CM

## 2024-02-23 LAB
ALBUMIN SERPL-MCNC: 2.4 G/DL (ref 3.5–5)
ALBUMIN/GLOB SERPL: 0.4 RATIO (ref 1.1–2)
ALP SERPL-CCNC: 105 UNIT/L (ref 40–150)
ALT SERPL-CCNC: 16 UNIT/L (ref 0–55)
AST SERPL-CCNC: 8 UNIT/L (ref 5–34)
BASOPHILS # BLD AUTO: 0.02 X10(3)/MCL
BASOPHILS NFR BLD AUTO: 0.1 %
BILIRUB SERPL-MCNC: 0.3 MG/DL
BUN SERPL-MCNC: 26.4 MG/DL (ref 8.4–25.7)
CALCIUM SERPL-MCNC: 9.6 MG/DL (ref 8.4–10.2)
CHLORIDE SERPL-SCNC: 104 MMOL/L (ref 98–107)
CO2 SERPL-SCNC: 18 MMOL/L (ref 22–29)
CREAT SERPL-MCNC: 1.11 MG/DL (ref 0.73–1.18)
EOSINOPHIL # BLD AUTO: 0.18 X10(3)/MCL (ref 0–0.9)
EOSINOPHIL NFR BLD AUTO: 1.1 %
ERYTHROCYTE [DISTWIDTH] IN BLOOD BY AUTOMATED COUNT: 15 % (ref 11.5–17)
EST. AVERAGE GLUCOSE BLD GHB EST-MCNC: 197.3 MG/DL
GFR SERPLBLD CREATININE-BSD FMLA CKD-EPI: >60 MLS/MIN/1.73/M2
GLOBULIN SER-MCNC: 5.4 GM/DL (ref 2.4–3.5)
GLUCOSE SERPL-MCNC: 327 MG/DL (ref 74–100)
HBA1C MFR BLD: 8.5 %
HCT VFR BLD AUTO: 33.3 % (ref 42–52)
HGB BLD-MCNC: 10.3 G/DL (ref 14–18)
IMM GRANULOCYTES # BLD AUTO: 0.09 X10(3)/MCL (ref 0–0.04)
IMM GRANULOCYTES NFR BLD AUTO: 0.5 %
LYMPHOCYTES # BLD AUTO: 1.71 X10(3)/MCL (ref 0.6–4.6)
LYMPHOCYTES NFR BLD AUTO: 10 %
MCH RBC QN AUTO: 26.1 PG (ref 27–31)
MCHC RBC AUTO-ENTMCNC: 30.9 G/DL (ref 33–36)
MCV RBC AUTO: 84.5 FL (ref 80–94)
MONOCYTES # BLD AUTO: 0.8 X10(3)/MCL (ref 0.1–1.3)
MONOCYTES NFR BLD AUTO: 4.7 %
NEUTROPHILS # BLD AUTO: 14.24 X10(3)/MCL (ref 2.1–9.2)
NEUTROPHILS NFR BLD AUTO: 83.6 %
PLATELET # BLD AUTO: 304 X10(3)/MCL (ref 130–400)
PMV BLD AUTO: 11.6 FL (ref 7.4–10.4)
POCT GLUCOSE: 379 MG/DL (ref 70–110)
POTASSIUM SERPL-SCNC: 5.3 MMOL/L (ref 3.5–5.1)
PREALB SERPL-MCNC: 14.3 MG/DL (ref 18–45)
PROT SERPL-MCNC: 7.8 GM/DL (ref 6.4–8.3)
RBC # BLD AUTO: 3.94 X10(6)/MCL (ref 4.7–6.1)
SODIUM SERPL-SCNC: 133 MMOL/L (ref 136–145)
WBC # SPEC AUTO: 17.04 X10(3)/MCL (ref 4.5–11.5)

## 2024-02-23 PROCEDURE — 84134 ASSAY OF PREALBUMIN: CPT | Performed by: PEDIATRICS

## 2024-02-23 PROCEDURE — 96360 HYDRATION IV INFUSION INIT: CPT

## 2024-02-23 PROCEDURE — 99285 EMERGENCY DEPT VISIT HI MDM: CPT | Mod: 25

## 2024-02-23 PROCEDURE — 63600175 PHARM REV CODE 636 W HCPCS: Performed by: INTERNAL MEDICINE

## 2024-02-23 PROCEDURE — 94760 N-INVAS EAR/PLS OXIMETRY 1: CPT

## 2024-02-23 PROCEDURE — 87040 BLOOD CULTURE FOR BACTERIA: CPT | Performed by: EMERGENCY MEDICINE

## 2024-02-23 PROCEDURE — 85025 COMPLETE CBC W/AUTO DIFF WBC: CPT | Performed by: EMERGENCY MEDICINE

## 2024-02-23 PROCEDURE — 87070 CULTURE OTHR SPECIMN AEROBIC: CPT | Performed by: PEDIATRICS

## 2024-02-23 PROCEDURE — 25000003 PHARM REV CODE 250: Performed by: INTERNAL MEDICINE

## 2024-02-23 PROCEDURE — 11000001 HC ACUTE MED/SURG PRIVATE ROOM

## 2024-02-23 PROCEDURE — 99900035 HC TECH TIME PER 15 MIN (STAT)

## 2024-02-23 PROCEDURE — 80053 COMPREHEN METABOLIC PANEL: CPT | Performed by: EMERGENCY MEDICINE

## 2024-02-23 PROCEDURE — 83036 HEMOGLOBIN GLYCOSYLATED A1C: CPT | Performed by: INTERNAL MEDICINE

## 2024-02-23 PROCEDURE — 63600175 PHARM REV CODE 636 W HCPCS: Performed by: EMERGENCY MEDICINE

## 2024-02-23 PROCEDURE — 25000003 PHARM REV CODE 250: Performed by: EMERGENCY MEDICINE

## 2024-02-23 RX ORDER — LOPERAMIDE HYDROCHLORIDE 2 MG/1
2 CAPSULE ORAL 4 TIMES DAILY PRN
Status: DISCONTINUED | OUTPATIENT
Start: 2024-02-23 | End: 2024-02-26 | Stop reason: HOSPADM

## 2024-02-23 RX ORDER — METOPROLOL TARTRATE 1 MG/ML
5 INJECTION, SOLUTION INTRAVENOUS EVERY 5 MIN PRN
Status: DISCONTINUED | OUTPATIENT
Start: 2024-02-23 | End: 2024-02-26 | Stop reason: HOSPADM

## 2024-02-23 RX ORDER — ASPIRIN 81 MG/1
81 TABLET ORAL DAILY
Status: DISCONTINUED | OUTPATIENT
Start: 2024-02-24 | End: 2024-02-26 | Stop reason: HOSPADM

## 2024-02-23 RX ORDER — GLUCAGON 1 MG
1 KIT INJECTION
Status: DISCONTINUED | OUTPATIENT
Start: 2024-02-23 | End: 2024-02-26 | Stop reason: HOSPADM

## 2024-02-23 RX ORDER — DIPHENHYDRAMINE HYDROCHLORIDE 50 MG/ML
25 INJECTION INTRAMUSCULAR; INTRAVENOUS EVERY 6 HOURS PRN
Status: DISCONTINUED | OUTPATIENT
Start: 2024-02-23 | End: 2024-02-26 | Stop reason: HOSPADM

## 2024-02-23 RX ORDER — SIMETHICONE 80 MG
1 TABLET,CHEWABLE ORAL 4 TIMES DAILY PRN
Status: DISCONTINUED | OUTPATIENT
Start: 2024-02-23 | End: 2024-02-26 | Stop reason: HOSPADM

## 2024-02-23 RX ORDER — ZOLPIDEM TARTRATE 5 MG/1
5 TABLET ORAL NIGHTLY PRN
Status: DISCONTINUED | OUTPATIENT
Start: 2024-02-23 | End: 2024-02-26 | Stop reason: HOSPADM

## 2024-02-23 RX ORDER — ACETAMINOPHEN 325 MG/1
650 TABLET ORAL EVERY 4 HOURS PRN
Status: DISCONTINUED | OUTPATIENT
Start: 2024-02-23 | End: 2024-02-26 | Stop reason: HOSPADM

## 2024-02-23 RX ORDER — ONDANSETRON HYDROCHLORIDE 2 MG/ML
4 INJECTION, SOLUTION INTRAVENOUS EVERY 6 HOURS PRN
Status: DISCONTINUED | OUTPATIENT
Start: 2024-02-23 | End: 2024-02-26 | Stop reason: HOSPADM

## 2024-02-23 RX ORDER — HYDROXYZINE PAMOATE 25 MG/1
25 CAPSULE ORAL EVERY 8 HOURS PRN
Status: DISCONTINUED | OUTPATIENT
Start: 2024-02-23 | End: 2024-02-26 | Stop reason: HOSPADM

## 2024-02-23 RX ORDER — INSULIN ASPART 100 [IU]/ML
0-10 INJECTION, SOLUTION INTRAVENOUS; SUBCUTANEOUS
Status: DISCONTINUED | OUTPATIENT
Start: 2024-02-23 | End: 2024-02-26 | Stop reason: HOSPADM

## 2024-02-23 RX ORDER — LANOLIN ALCOHOL/MO/W.PET/CERES
1 CREAM (GRAM) TOPICAL DAILY
Status: DISCONTINUED | OUTPATIENT
Start: 2024-02-24 | End: 2024-02-26 | Stop reason: HOSPADM

## 2024-02-23 RX ORDER — ASCORBIC ACID 500 MG
500 TABLET ORAL DAILY
Status: DISCONTINUED | OUTPATIENT
Start: 2024-02-24 | End: 2024-02-26 | Stop reason: HOSPADM

## 2024-02-23 RX ORDER — CALCIUM CARBONATE 200(500)MG
1000 TABLET,CHEWABLE ORAL 3 TIMES DAILY PRN
Status: DISCONTINUED | OUTPATIENT
Start: 2024-02-23 | End: 2024-02-26 | Stop reason: HOSPADM

## 2024-02-23 RX ORDER — SODIUM CHLORIDE 9 MG/ML
INJECTION, SOLUTION INTRAVENOUS
Status: DISCONTINUED | OUTPATIENT
Start: 2024-02-23 | End: 2024-02-26 | Stop reason: HOSPADM

## 2024-02-23 RX ORDER — CARVEDILOL 12.5 MG/1
12.5 TABLET ORAL 2 TIMES DAILY WITH MEALS
Status: DISCONTINUED | OUTPATIENT
Start: 2024-02-23 | End: 2024-02-24

## 2024-02-23 RX ORDER — HYDROCODONE BITARTRATE AND ACETAMINOPHEN 5; 325 MG/1; MG/1
1 TABLET ORAL EVERY 4 HOURS PRN
Status: DISCONTINUED | OUTPATIENT
Start: 2024-02-23 | End: 2024-02-26 | Stop reason: HOSPADM

## 2024-02-23 RX ORDER — HYDRALAZINE HYDROCHLORIDE 20 MG/ML
10 INJECTION INTRAMUSCULAR; INTRAVENOUS EVERY 4 HOURS PRN
Status: DISCONTINUED | OUTPATIENT
Start: 2024-02-23 | End: 2024-02-26 | Stop reason: HOSPADM

## 2024-02-23 RX ORDER — BUDESONIDE 0.5 MG/2ML
0.5 INHALANT ORAL EVERY 12 HOURS
Status: DISCONTINUED | OUTPATIENT
Start: 2024-02-23 | End: 2024-02-26 | Stop reason: HOSPADM

## 2024-02-23 RX ORDER — LEVOFLOXACIN 5 MG/ML
750 INJECTION, SOLUTION INTRAVENOUS
Status: DISCONTINUED | OUTPATIENT
Start: 2024-02-23 | End: 2024-02-26

## 2024-02-23 RX ORDER — IBUPROFEN 200 MG
16 TABLET ORAL
Status: DISCONTINUED | OUTPATIENT
Start: 2024-02-23 | End: 2024-02-26 | Stop reason: HOSPADM

## 2024-02-23 RX ORDER — PANTOPRAZOLE SODIUM 40 MG/1
40 TABLET, DELAYED RELEASE ORAL DAILY
Status: DISCONTINUED | OUTPATIENT
Start: 2024-02-24 | End: 2024-02-26 | Stop reason: HOSPADM

## 2024-02-23 RX ORDER — CLONIDINE HYDROCHLORIDE 0.1 MG/1
0.1 TABLET ORAL EVERY 4 HOURS PRN
Status: DISCONTINUED | OUTPATIENT
Start: 2024-02-23 | End: 2024-02-26 | Stop reason: HOSPADM

## 2024-02-23 RX ORDER — BENZONATATE 100 MG/1
100 CAPSULE ORAL 3 TIMES DAILY PRN
Status: DISCONTINUED | OUTPATIENT
Start: 2024-02-23 | End: 2024-02-26 | Stop reason: HOSPADM

## 2024-02-23 RX ORDER — IBUPROFEN 200 MG
24 TABLET ORAL
Status: DISCONTINUED | OUTPATIENT
Start: 2024-02-23 | End: 2024-02-26 | Stop reason: HOSPADM

## 2024-02-23 RX ORDER — BISACODYL 5 MG
5 TABLET, DELAYED RELEASE (ENTERIC COATED) ORAL DAILY PRN
Status: DISCONTINUED | OUTPATIENT
Start: 2024-02-23 | End: 2024-02-26 | Stop reason: HOSPADM

## 2024-02-23 RX ORDER — ALBUTEROL SULFATE 0.83 MG/ML
2.5 SOLUTION RESPIRATORY (INHALATION) EVERY 6 HOURS
Status: DISCONTINUED | OUTPATIENT
Start: 2024-02-23 | End: 2024-02-24

## 2024-02-23 RX ADMIN — INSULIN ASPART 5 UNITS: 100 INJECTION, SOLUTION INTRAVENOUS; SUBCUTANEOUS at 10:02

## 2024-02-23 RX ADMIN — CALCIUM CARBONATE (ANTACID) CHEW TAB 500 MG 1000 MG: 500 CHEW TAB at 10:02

## 2024-02-23 RX ADMIN — SODIUM CHLORIDE: 9 INJECTION, SOLUTION INTRAVENOUS at 06:02

## 2024-02-23 RX ADMIN — LEVOFLOXACIN 750 MG: 750 INJECTION, SOLUTION INTRAVENOUS at 06:02

## 2024-02-23 RX ADMIN — SODIUM CHLORIDE 1000 ML: 9 INJECTION, SOLUTION INTRAVENOUS at 03:02

## 2024-02-23 NOTE — ED PROVIDER NOTES
Encounter Date: 2/23/2024       History     Chief Complaint   Patient presents with    Wound Infection     Pt with wound to rosaura feet and sacrum with fever today      This 58-year-old man with quadriplegic spinal paralysis and numerous decubitus ulcers who is followed by wound care is brought in today due to fever.  He had wound cultures done 3 days ago that have grown Proteus mirabilis and Klebsiella pneumoniae.  Sensitivities are not complete.  The wounds were noted to have significant odor and heavy green drainage.  In addition the patient had some nausea and vomiting and home health noted that his blood pressure was dipping.  He was advised to come to the emergency room.  Wound cultures were drawn from the right hip wound.  His wife reports that when the wound is pressed above pus comes out.  Both Klebsiella and Proteus maybe treated with fluoroquinolones which are both IV and oral.  The patient's wife preferred if the patient came home so that she could attend to his wounds however the patient expressed a desire to stay in the hospital on IV antibiotics if possible.       Review of patient's allergies indicates:  No Known Allergies  Past Medical History:   Diagnosis Date    A-fib     Cardiac arrest     7/2022    Chronic skin ulcer     DM (diabetes mellitus)     Infected decubitus ulcer     Lymphedema of leg     Neurogenic bladder     Obesity, unspecified     Pressure ulcer of heel     Pressure ulcer of right foot, stage 3     Pressure ulcer of right ischium     Quadriplegia     Quadriplegic spinal paralysis      Past Surgical History:   Procedure Laterality Date    APPLICATION OF WOUND VACUUM-ASSISTED CLOSURE DEVICE Right 5/12/2023    Procedure: APPLICATION, WOUND VAC;  Surgeon: Keyonna Jean MD;  Location: [facility] OR;  Service: General;  Laterality: Right;    DEBRIDEMENT OF BUTTOCKS Right 5/12/2023    Procedure: DEBRIDEMENT, BUTTOCK;  Surgeon: Keyonna Jean MD;  Location: [facility] OR;  Service: General;   Laterality: Right;    INCISION AND DRAINAGE HIP Bilateral 8/16/2023    Procedure: INCISION AND DRAINAGE, HIP;  Surgeon: Ryan Tirado MD;  Location: [facility];  Service: General;  Laterality: Bilateral;  1. Excisional debridement skin to bone, skin to bone with biopsy and debridement of hard bone at the Left hip necrotic wound 11 cm long 5-1/2 cm wide 4-1/2 cm deep upon completion of debridement   2. Excisional debridement skin to muscle right hip with debridement     No family history on file.  Social History     Tobacco Use    Smoking status: Never    Smokeless tobacco: Never   Substance Use Topics    Alcohol use: Never    Drug use: Never     Review of Systems   Constitutional:  Positive for fever.   HENT:  Negative for sore throat.    Respiratory:  Negative for shortness of breath.    Cardiovascular:  Negative for chest pain.   Gastrointestinal:  Positive for nausea and vomiting.   Genitourinary:  Negative for dysuria.   Musculoskeletal:  Negative for back pain.   Skin:  Negative for rash.   Neurological:  Negative for weakness.   Hematological:  Does not bruise/bleed easily.       Physical Exam     Initial Vitals [02/23/24 1402]   BP Pulse Resp Temp SpO2   (!) 153/99 (!) 131 20 99.3 °F (37.4 °C) 98 %      MAP       --         Physical Exam    Nursing note and vitals reviewed.  Constitutional: He appears well-developed and well-nourished.   HENT:   Head: Normocephalic and atraumatic.   Mouth/Throat: Mucous membranes are normal.   Eyes: EOM are normal. Pupils are equal, round, and reactive to light.   Neck: Neck supple.   Normal range of motion.  Cardiovascular:  Normal rate, regular rhythm, normal heart sounds and intact distal pulses.           Pulmonary/Chest: Breath sounds normal.   Abdominal: Abdomen is soft. Bowel sounds are normal.   Musculoskeletal:         General: Normal range of motion.      Cervical back: Normal range of motion and neck supple.     Neurological: He is alert and oriented to person,  place, and time. He has normal strength.   Skin: Skin is warm and dry. Capillary refill takes less than 2 seconds.   Multiple pressure ulcers to sacrum, right and left hip, right and left heel, and anterior left foot.    Psychiatric: He has a normal mood and affect. His behavior is normal. Judgment and thought content normal.         ED Course   Procedures  Labs Reviewed   COMPREHENSIVE METABOLIC PANEL - Abnormal; Notable for the following components:       Result Value    Sodium Level 133 (*)     Potassium Level 5.3 (*)     Carbon Dioxide 18 (*)     Glucose Level 327 (*)     Blood Urea Nitrogen 26.4 (*)     Albumin Level 2.4 (*)     Globulin 5.4 (*)     Albumin/Globulin Ratio 0.4 (*)     All other components within normal limits   CBC WITH DIFFERENTIAL - Abnormal; Notable for the following components:    WBC 17.04 (*)     RBC 3.94 (*)     Hgb 10.3 (*)     Hct 33.3 (*)     MCH 26.1 (*)     MCHC 30.9 (*)     MPV 11.6 (*)     Neut # 14.24 (*)     IG# 0.09 (*)     All other components within normal limits   BLOOD CULTURE [facility]   BLOOD CULTURE [facility]   CBC W/ AUTO DIFFERENTIAL    Narrative:     The following orders were created for panel order CBC auto differential.  Procedure                               Abnormality         Status                     ---------                               -----------         ------                     CBC with Differential[[id number]]       Abnormal            Final result                 Please view results for these tests on the individual orders.          Imaging Results    None          Medications   albuterol nebulizer solution 2.5 mg (has no administration in time range)   ascorbic acid (vitamin C) tablet 500 mg (has no administration in time range)   aspirin EC tablet 81 mg (has no administration in time range)   budesonide nebulizer solution 0.5 mg (has no administration in time range)   carvediloL tablet 12.5 mg (12.5 mg Oral Not Given 2/23/24 1804)   ferrous sulfate tablet 1  each (has no administration in time range)   SITagliptin phosphate tablet 100 mg (has no administration in time range)   levoFLOXacin 750 mg/150 mL IVPB 750 mg (750 mg Intravenous New Bag 2/23/24 1804)   zolpidem tablet 5 mg (has no administration in time range)   cloNIDine tablet 0.1 mg (has no administration in time range)   bisacodyL EC tablet 5 mg (has no administration in time range)   simethicone chewable tablet 80 mg (has no administration in time range)   hydrALAZINE injection 10 mg (has no administration in time range)   loperamide capsule 2 mg (has no administration in time range)   ondansetron injection 4 mg (has no administration in time range)   pantoprazole EC tablet 40 mg (has no administration in time range)   benzonatate capsule 100 mg (has no administration in time range)   calcium carbonate 200 mg calcium (500 mg) chewable tablet 1,000 mg (has no administration in time range)   acetaminophen tablet 650 mg (has no administration in time range)   HYDROcodone-acetaminophen 5-325 mg per tablet 1 tablet (has no administration in time range)   metoprolol injection 5 mg (has no administration in time range)   glucose chewable tablet 16 g (has no administration in time range)   glucose chewable tablet 24 g (has no administration in time range)   dextrose 50% injection 12.5 g (has no administration in time range)   dextrose 50% injection 25 g (has no administration in time range)   glucagon (human recombinant) injection 1 mg (has no administration in time range)   insulin aspart U-100 injection 0-10 Units (has no administration in time range)   diphenhydrAMINE injection 25 mg (has no administration in time range)   hydrOXYzine pamoate capsule 25 mg (has no administration in time range)   0.9%  NaCl infusion ( Intravenous New Bag 2/23/24 1804)   sodium chloride 0.9% bolus 1,000 mL 1,000 mL (0 mLs Intravenous Stopped 2/23/24 1701)     Medical Decision Making  Amount and/or Complexity of Data Reviewed  Labs:  ordered.    Risk  OTC drugs.  Prescription drug management.  Decision regarding hospitalization.                                      Clinical Impression:  Final diagnoses:  [L89.90, L08.9] Infected pressure ulcer, unspecified pressure ulcer stage (Primary)          ED Disposition Condition    Admit Stable                Ronald Cadena MD  02/23/24 [id number]

## 2024-02-23 NOTE — H&P
[facility] Surgical [facility]  [facility] MEDICINE - H&P ADMISSION NOTE      Patient Name: Antonio Galvan II  MRN: [id number]  Patient Class: IP- Inpatient   Admission Date: 2/23/2024   Admitting Physician:  Service   Attending Physician: Jeanette Mann MD  Primary Care Provider: Jennifer Fernando NP  Face-to-Face encounter date: 02/23/2024        CHIEF COMPLAINT     Chief Complaint   Patient presents with    Wound Infection     Pt with wound to rosaura feet and sacrum with fever today        HISTORY OF PRESENTING ILLNESS     This 58-year-old man with quadriplegic spinal paralysis and numerous decubitus ulcers who is followed by wound care is brought in today due to fever. He had wound cultures done 3 days ago that have grown Proteus mirabilis and Klebsiella pneumoniae. Sensitivities are not complete. The wounds were noted to have significant odor and heavy green drainage. In addition the patient had some nausea and vomiting and home health noted that his blood pressure was dipping. He was advised to come to the emergency room. Wound cultures were drawn from the right hip wound. His wife reports that when the wound is pressed above pus comes out. Both Klebsiella and Proteus maybe treated with fluoroquinolones which are both IV and oral. The patient's wife preferred if the patient came home so that she could attend to his wounds however the patient expressed a desire to be admitted.    We will admit for IV abx and wound care and a consult to Dr. Jean when he is on campus for possible debridment.    PAST MEDICAL HISTORY     Past Medical History:   Diagnosis Date    A-fib     Cardiac arrest     7/2022    Chronic skin ulcer     DM (diabetes mellitus)     Infected decubitus ulcer     Lymphedema of leg     Neurogenic bladder     Obesity, unspecified     Pressure ulcer of heel     Pressure ulcer of right foot, stage 3     Pressure ulcer of right ischium     Quadriplegia     Quadriplegic spinal paralysis         PAST SURGICAL HISTORY     Past Surgical History:   Procedure Laterality Date    APPLICATION OF WOUND VACUUM-ASSISTED CLOSURE DEVICE Right 5/12/2023    Procedure: APPLICATION, WOUND VAC;  Surgeon: Keyonna Jean MD;  Location: [facility] OR;  Service: General;  Laterality: Right;    DEBRIDEMENT OF BUTTOCKS Right 5/12/2023    Procedure: DEBRIDEMENT, BUTTOCK;  Surgeon: Keyonna Jean MD;  Location: [facility] OR;  Service: General;  Laterality: Right;    INCISION AND DRAINAGE HIP Bilateral 8/16/2023    Procedure: INCISION AND DRAINAGE, HIP;  Surgeon: Ryan Tirado MD;  Location: [facility] OR;  Service: General;  Laterality: Bilateral;  1. Excisional debridement skin to bone, skin to bone with biopsy and debridement of hard bone at the Left hip necrotic wound 11 cm long 5-1/2 cm wide 4-1/2 cm deep upon completion of debridement   2. Excisional debridement skin to muscle right hip with debridement       FAMILY HISTORY   Reviewed and noncontributory to this case    SOCIAL HISTORY     Social History     Socioeconomic History    Marital status: Single   Tobacco Use    Smoking status: Never    Smokeless tobacco: Never   Substance and Sexual Activity    Alcohol use: Never    Drug use: Never    Sexual activity: Not Currently     Social Determinants of Health     Financial Resource Strain: Low Risk  (8/24/2023)    Overall Financial Resource Strain (CARDIA)     Difficulty of Paying Living Expenses: Not hard at all   Food Insecurity: No Food Insecurity (8/24/2023)    Hunger Vital Sign     Worried About Running Out of Food in the Last Year: Never true     Ran Out of Food in the Last Year: Never true   Transportation Needs: No Transportation Needs (8/24/2023)    PRAPARE - Transportation     Lack of Transportation (Medical): No     Lack of Transportation (Non-Medical): No   Physical Activity: Inactive (8/24/2023)    Exercise Vital Sign     Days of Exercise per Week: 0 days     Minutes of Exercise per Session: 0 min   Stress: No  Stress Concern Present (8/24/2023)    [place] [employer] - Occupational Stress Questionnaire     Feeling of Stress : Only a little   Social Connections: Moderately Integrated (8/24/2023)    Social Connection and Isolation Panel [NHANES]     Frequency of Communication with Friends and Family: More than three times a week     Frequency of Social Gatherings with Friends and Family: More than three times a week     Attends Taoist Services: 1 to 4 times per year     Active Member of Clubs or Organizations: Patient declined     Attends Club or Organization Meetings: 1 to 4 times per year     Marital Status: Never    Housing Stability: Low Risk  (8/24/2023)    Housing Stability Vital Sign     Unable to Pay for Housing in the Last Year: No     Number of Places Lived in the Last Year: 1     Unstable Housing in the Last Year: No       HOME MEDICATIONS     Prior to Admission medications    Medication Sig Start Date End Date Taking? Authorizing Provider   ascorbic acid, vitamin C, (VITAMIN C) 500 MG tablet Take 1 tablet (500 mg total) by mouth once daily. 9/28/23  Yes Jeanette Mann MD   aspirin (ECOTRIN) 81 MG EC tablet Take 1 tablet (81 mg total) by mouth once daily. 9/28/23 9/27/24 Yes Jeanette Mann MD   bisacodyL (DULCOLAX) 10 mg Supp Place 1 suppository (10 mg total) rectally daily as needed (Until bowel movement if patient has no bowel movement for 2 days). 9/27/23  Yes Jeanette Mann MD   budesonide (PULMICORT) 0.5 mg/2 mL nebulizer solution Take 2 mLs (0.5 mg total) by nebulization daily as needed (Wheezing, SOB). Controller 9/27/23 9/26/24 Yes Jeanette Mann MD   carvediloL (COREG) 12.5 MG tablet Take 1 tablet (12.5 mg total) by mouth 2 (two) times daily with meals. 9/27/23 9/26/24 Yes Jeanette Mann MD   doxycycline (VIBRAMYCIN) 100 MG Cap Take 1 capsule (100 mg total) by mouth 2 (two) times daily. for 10 days 2/20/24 3/1/24 Yes Ronald Barcenas MD   ferrous sulfate  325 (65 FE) MG EC tablet Take 1 tablet (325 mg total) by mouth once daily. 9/27/23  Yes Jeanette Mann MD   JANUVIA 50 mg Tab Take 100 mg by mouth once daily. 5/4/22  Yes Provider, Historical   albuterol (PROVENTIL) 2.5 mg /3 mL (0.083 %) nebulizer solution Inhale 2.5 mg into the lungs. 12/8/21 9/28/23  Provider, Historical   ALKALOL NASAL WASH Soln 50 mLs by Nasal route once daily. 8/15/22   Provider, Historical   collagenase (SANTYL) ointment Apply topically once daily.  Patient not taking: Reported on 9/28/2023 5/25/23   Syl Avila MD   insulin detemir U-100 (LEVEMIR) 100 unit/mL injection Inject 7 Units into the skin every evening.  Patient not taking: Reported on 9/28/2023 9/27/23 9/26/24  Jeanette Mann MD   polyethylene glycol (GLYCOLAX) 17 gram PwPk Take 17 g by mouth 3 (three) times daily as needed (nausea vomiting).  Patient not taking: Reported on 9/28/2023 9/27/23   Jeanette Mann MD       ALLERGIES   Patient has no known allergies.          REVIEW OF SYSTEMS   Except as documented above, all other systems reviewed and negative    PHYSICAL EXAM     Vitals:    02/23/24 1602   BP: 121/87   Pulse: (!) 111   Resp:    Temp:       General:  In no acute distress, resting comfortably  Head and neck:  Atraumatic, normocephalic, moist mucous membranes, supple neck  Chest:  Clear to auscultation bilaterally  Heart:  S1, S2, no appreciable murmur  Abdomen:  Soft, nontender, BS +  MSK:  Warm, no lower extremity edema, no clubbing or cyanosis  Neuro:  Alert and oriented x4,   Integumentary:  see wound care photos  Psychiatry:  Appropriate mood and affect          ASSESSMENT AND PLAN   There are no hospital problems to display for this patient.     Infected wounds/sacrum  -continue Levaquin start date February 23rd  -I will consult Dr. Jean for possible    DM  -on sitagliptin we will add insulin sliding scale    Hypotension  -improved    Neurogenic  bladder  -chronic    Quadraplegia  -chronic    DVT prophylaxis:  History of bleeding in recent past.                 __________________________________________________________________  LABS/MICRO/MEDS/DIAGNOSTICS       LABS  Recent Labs     02/23/24  1445   *   K 5.3*   CHLORIDE 104   CO2 18*   BUN 26.4*   CREATININE 1.11   GLUCOSE 327*   CALCIUM 9.6   ALKPHOS 105   AST 8   ALT 16   ALBUMIN 2.4*     Recent Labs     02/23/24  1445   WBC 17.04*   RBC 3.94*   HCT 33.3*   MCV 84.5          MICROBIOLOGY  Microbiology Results (last 7 days)       Procedure Component Value Units Date/Time    Blood culture #1 **CANNOT BE ORDERED STAT** [[id number]] Collected: 02/23/24 1539    Order Status: Sent Specimen: Blood from Hand, Left Updated: 02/23/24 1539    Blood culture #2 **CANNOT BE ORDERED STAT** [[id number]] Collected: 02/23/24 1539    Order Status: Sent Specimen: Blood from Hand, Left Updated: 02/23/24 1539            MEDICATIONS   albuterol  2.5 mg Nebulization Q6H    [START ON 2/24/2024] ascorbic acid (vitamin C)  500 mg Oral Daily    [START ON 2/24/2024] aspirin  81 mg Oral Daily    budesonide  0.5 mg Nebulization Q12H    carvediloL  12.5 mg Oral BID WM    [START ON 2/24/2024] ferrous sulfate  1 tablet Oral Daily    levoFLOXacin  750 mg Intravenous Q24H    [START ON 2/24/2024] pantoprazole  40 mg Oral Daily    [START ON 2/24/2024] SITagliptin phosphate  100 mg Oral Daily      INFUSIONS      DIAGNOSTIC TESTS  No orders to display          Patient information was obtained from patient, patient's family, past medical records and ER records.   All diagnosis and differential diagnosis have been reviewed; assessment and plan has been documented. I have personally reviewed the labs and test results that are presently available; I have reviewed the patients medication list. I have reviewed the consulting providers response and recommendations. I have reviewed or attempted to review medical records based upon their  availability.  All of the patient's questions have been addressed and answered. Patient's is agreeable to the above stated plan. I will continue to monitor closely and make adjustments to medical management as needed.  This note was created using [employer] voice recognition software that occasionally misinterpreted phrases or words.  Please contact me if any questions may rise regarding documentation to clarify verbiage.        Jeanette Mann MD   Internal Medicine  Department of Hospital Medicine [facility] - [facility] Surgical Unit    Patient/Caregiver provided printed discharge information.

## 2024-02-23 NOTE — TELEPHONE ENCOUNTER
Received phone call from Nasreen at [facility].   Reports pt has significant odor to wounds today, has heavy dark green drainage, nausea and vomiting, blood pressure dipping down.  Results of cultures remain pending without sensitivity.  Due to blood pressure changes and significant changes from yesterday.   Recommend patient report to a hospital for evaluation

## 2024-02-24 LAB
BACTERIA TISS AEROBE CULT: ABNORMAL
BACTERIA TISS AEROBE CULT: ABNORMAL
POCT GLUCOSE: 221 MG/DL (ref 70–110)
POCT GLUCOSE: 222 MG/DL (ref 70–110)
POCT GLUCOSE: 223 MG/DL (ref 70–110)
POCT GLUCOSE: 255 MG/DL (ref 70–110)

## 2024-02-24 PROCEDURE — 25000003 PHARM REV CODE 250: Performed by: EMERGENCY MEDICINE

## 2024-02-24 PROCEDURE — 94760 N-INVAS EAR/PLS OXIMETRY 1: CPT

## 2024-02-24 PROCEDURE — 63600175 PHARM REV CODE 636 W HCPCS: Performed by: EMERGENCY MEDICINE

## 2024-02-24 PROCEDURE — 11000001 HC ACUTE MED/SURG PRIVATE ROOM

## 2024-02-24 PROCEDURE — 25000003 PHARM REV CODE 250: Performed by: INTERNAL MEDICINE

## 2024-02-24 PROCEDURE — 63600175 PHARM REV CODE 636 W HCPCS: Performed by: INTERNAL MEDICINE

## 2024-02-24 PROCEDURE — 99900035 HC TECH TIME PER 15 MIN (STAT)

## 2024-02-24 RX ORDER — ALBUTEROL SULFATE 0.83 MG/ML
2.5 SOLUTION RESPIRATORY (INHALATION) EVERY 4 HOURS PRN
Status: DISCONTINUED | OUTPATIENT
Start: 2024-02-24 | End: 2024-02-26 | Stop reason: HOSPADM

## 2024-02-24 RX ORDER — TALC
6 POWDER (GRAM) TOPICAL NIGHTLY PRN
Status: DISCONTINUED | OUTPATIENT
Start: 2024-02-24 | End: 2024-02-26 | Stop reason: HOSPADM

## 2024-02-24 RX ORDER — CETIRIZINE HYDROCHLORIDE 10 MG/1
10 TABLET ORAL DAILY
Status: DISCONTINUED | OUTPATIENT
Start: 2024-02-24 | End: 2024-02-26 | Stop reason: HOSPADM

## 2024-02-24 RX ORDER — ACETAMINOPHEN 325 MG/1
650 TABLET ORAL NIGHTLY
Status: DISCONTINUED | OUTPATIENT
Start: 2024-02-24 | End: 2024-02-26 | Stop reason: HOSPADM

## 2024-02-24 RX ORDER — DIPHENHYDRAMINE HCL 25 MG
25 CAPSULE ORAL NIGHTLY
Status: DISCONTINUED | OUTPATIENT
Start: 2024-02-24 | End: 2024-02-26 | Stop reason: HOSPADM

## 2024-02-24 RX ADMIN — HYDROCODONE BITARTRATE AND ACETAMINOPHEN 1 TABLET: 5; 325 TABLET ORAL at 02:02

## 2024-02-24 RX ADMIN — LEVOFLOXACIN 750 MG: 750 INJECTION, SOLUTION INTRAVENOUS at 05:02

## 2024-02-24 RX ADMIN — COLLAGENASE SANTYL: 250 OINTMENT TOPICAL at 03:02

## 2024-02-24 RX ADMIN — FERROUS SULFATE TAB 325 MG (65 MG ELEMENTAL FE) 1 EACH: 325 (65 FE) TAB at 09:02

## 2024-02-24 RX ADMIN — INSULIN ASPART 4 UNITS: 100 INJECTION, SOLUTION INTRAVENOUS; SUBCUTANEOUS at 05:02

## 2024-02-24 RX ADMIN — DIPHENHYDRAMINE HYDROCHLORIDE 25 MG: 25 CAPSULE ORAL at 09:02

## 2024-02-24 RX ADMIN — CALCIUM CARBONATE (ANTACID) CHEW TAB 500 MG 1000 MG: 500 CHEW TAB at 07:02

## 2024-02-24 RX ADMIN — OXYCODONE HYDROCHLORIDE AND ACETAMINOPHEN 500 MG: 500 TABLET ORAL at 09:02

## 2024-02-24 RX ADMIN — LOPERAMIDE HYDROCHLORIDE 2 MG: 2 CAPSULE ORAL at 02:02

## 2024-02-24 RX ADMIN — CETIRIZINE HYDROCHLORIDE 10 MG: 10 TABLET, FILM COATED ORAL at 09:02

## 2024-02-24 RX ADMIN — ASPIRIN 81 MG: 81 TABLET, COATED ORAL at 09:02

## 2024-02-24 RX ADMIN — SODIUM CHLORIDE: 9 INJECTION, SOLUTION INTRAVENOUS at 05:02

## 2024-02-24 RX ADMIN — SITAGLIPTIN 100 MG: 50 TABLET, FILM COATED ORAL at 09:02

## 2024-02-24 RX ADMIN — ACETAMINOPHEN 650 MG: 325 TABLET ORAL at 02:02

## 2024-02-24 RX ADMIN — PANTOPRAZOLE SODIUM 40 MG: 40 TABLET, DELAYED RELEASE ORAL at 09:02

## 2024-02-24 RX ADMIN — INSULIN ASPART 6 UNITS: 100 INJECTION, SOLUTION INTRAVENOUS; SUBCUTANEOUS at 12:02

## 2024-02-24 RX ADMIN — INSULIN ASPART 2 UNITS: 100 INJECTION, SOLUTION INTRAVENOUS; SUBCUTANEOUS at 09:02

## 2024-02-24 RX ADMIN — ACETAMINOPHEN 650 MG: 325 TABLET ORAL at 09:02

## 2024-02-24 NOTE — PLAN OF CARE
[facility]  Initial Discharge Assessment       Primary Care Provider: Jennifer Fernando NP    Admission Diagnosis: Infected pressure ulcer, unspecified pressure ulcer stage [L89.90, L08.9]    Admission Date: 2/23/2024  Expected Discharge Date:     Transition of Care Barriers: None    Payor: MEDICARE / Plan: MEDICARE PART A & B / Product Type: Government /     Extended Emergency Contact Information  Primary Emergency Contact: Judy Galvan  Mobile Phone: [phone]  Relation: Mother  Preferred language: English   needed? No  Secondary Emergency Contact: Yvonnejolly  Mobile Phone: [phone]  Relation: Significant other  Preferred language: English   needed? No    Discharge Plan A: Home with family, Home Health         [facility] - [address] [address]  Phone: [phone] Fax: [phone]    [id number] [address] [facility] [address] [address]  Phone: [phone] Fax: [phone]      Initial Assessment (most recent)       Adult Discharge Assessment - 02/24/24 1611          Discharge Assessment    Assessment Type Discharge Planning Assessment     Confirmed/corrected address, phone number and insurance Yes     Confirmed Demographics Correct on Facesheet     Source of Information family;patient     If unable to respond/provide information was family/caregiver contacted? Yes     Contact Name/Number Judy mother      Communicated SADE with patient/caregiver Date not available/Unable to determine     Reason For Admission Wounds     People in Home parent(s)     Facility Arrived From: home     Do you expect to return to your current living situation? Yes     Do you have help at home or someone to help you manage your care at home? Yes     Who are your caregiver(s) and their phone number(s)?  Judy mother      Prior to hospitilization cognitive status: Alert/Oriented     Current cognitive status: Alert/Oriented     Walking or Climbing Stairs Difficulty yes     Walking or Climbing Stairs transferring difficulty, dependent     Dressing/Bathing Difficulty yes     Dressing/Bathing dressing difficulty, dependent     Home Accessibility wheelchair accessible     Home Layout Able to live on 1st floor     Equipment Currently Used at Home bedside commode;hospital bed;lift device;shower chair;wheelchair     Readmission within 30 days? No     Patient currently being followed by outpatient case management? No     Do you currently have service(s) that help you manage your care at home? No     Is the pt/caregiver preference to resume services with current agency No     Do you take prescription medications? Yes     Do you have prescription coverage? Yes     Do you have any problems affording any of your prescribed medications? TBD     Is the patient taking medications as prescribed? yes     Who is going to help you get home at discharge? Judy echeverria      How do you get to doctors appointments? family or friend will provide     Are you on dialysis? No     Do you take coumadin? No     Discharge Plan A Home with family;Home Health     DME Needed Upon Discharge  none     Discharge Plan discussed with: Parent(s);Patient     Name(s) and Number(s) Judy echeverria      Transition of Care Barriers None        Physical Activity    On average, how many days per week do you engage in moderate to strenuous exercise (like a brisk walk)? 0 days     On average, how many minutes do you engage in exercise at this level? 0 min        Financial Resource Strain    How hard is it for you to pay for the very basics like food, housing, medical care, and heating? Not hard at all        Housing Stability    In the last 12 months, was there a time when you were not able to pay the mortgage or rent on time? No      In the last 12 months, how many places have you lived? 1     In the last 12 months, was there a time when you did not have a steady place to sleep or slept in a shelter (including now)? No        Transportation Needs    In the past 12 months, has lack of transportation kept you from medical appointments or from getting medications? No     In the past 12 months, has lack of transportation kept you from meetings, work, or from getting things needed for daily living? No        Food Insecurity    Within the past 12 months, you worried that your food would run out before you got the money to buy more. Never true     Within the past 12 months, the food you bought just didn't last and you didn't have money to get more. Never true        Stress    Do you feel stress - tense, restless, nervous, or anxious, or unable to sleep at night because your mind is troubled all the time - these days? To some extent        Social Connections    In a typical week, how many times do you talk on the phone with family, friends, or neighbors? More than three times a week     How often do you get together with friends or relatives? More than three times a week     How often do you attend Christianity or Hinduism services? 1 to 4 times per year     Do you belong to any clubs or organizations such as Christianity groups, unions, fraternal or athletic groups, or school groups? Yes     How often do you attend meetings of the clubs or organizations you belong to? 1 to 4 times per year     Are you , , , , never , or living with a partner? Never         Alcohol Use    Q1: How often do you have a drink containing alcohol? Never     Q2: How many drinks containing alcohol do you have on a typical day when you are drinking? Patient does not drink     Q3: How often do you have six or more drinks on one occasion? Never        OTHER    Name(s) of People in Home Judy mother

## 2024-02-24 NOTE — PLAN OF CARE
Problem: Skin Injury Risk Increased  Goal: Skin Health and Integrity  Outcome: Ongoing, Progressing  Intervention: Optimize Skin Protection  Flowsheets (Taken 2/23/2024 1918)  Pressure Reduction Techniques:   positioned off wounds   pressure points protected   weight shift assistance provided   heels elevated off bed  Pressure Reduction Devices:   specialty bed utilized   positioning supports utilized   heel offloading device utilized   pressure-redistributing mattress utilized  Skin Protection:   adhesive use limited   incontinence pads utilized   protective footwear used   transparent dressing maintained   skin-to-skin areas padded   tubing/devices free from skin contact   skin-to-device areas padded  Head of Bed (HOB) Positioning: HOB at 20-30 degrees  Intervention: Promote and Optimize Oral Intake  Flowsheets (Taken 2/23/2024 1918)  Oral Nutrition Promotion:   calorie-dense foods provided   calorie-dense liquids provided     Problem: Adult Inpatient Plan of Care  Goal: Plan of Care Review  Outcome: Ongoing, Progressing  Flowsheets (Taken 2/23/2024 1918)  Plan of Care Reviewed With:   patient   family   parent   significant other  Goal: Patient-Specific Goal (Individualized)  Outcome: Ongoing, Progressing  Flowsheets (Taken 2/23/2024 1918)  Anxieties, Fears or Concerns: None expressed  Individualized Care Needs: Wound care, IV antibiotics, body positioning and weight redistributing  Goal: Absence of Hospital-Acquired Illness or Injury  Outcome: Ongoing, Progressing  Intervention: Identify and Manage Fall Risk  Flowsheets (Taken 2/23/2024 1918)  Safety Promotion/Fall Prevention:   assistive device/personal item within reach   family to remain at bedside   room near unit station   side rails raised x 3  Intervention: Prevent Skin Injury  Flowsheets (Taken 2/23/2024 1918)  Body Position:   sitting up in bed   weight shifting   legs elevated   log-rolled   heels elevated   position changed independently  Skin  Protection:   adhesive use limited   incontinence pads utilized   protective footwear used   transparent dressing maintained   skin-to-skin areas padded   tubing/devices free from skin contact   skin-to-device areas padded  Intervention: Prevent and Manage VTE (Venous Thromboembolism) Risk  Flowsheets (Taken 2/23/2024 1918)  Activity Management: Rolling - L1  VTE Prevention/Management:   fluids promoted   bleeding risk assessed   bleeding precations maintained  Intervention: Prevent Infection  Flowsheets (Taken 2/23/2024 1918)  Infection Prevention:   cohorting utilized   environmental surveillance performed   equipment surfaces disinfected   hand hygiene promoted   single patient room provided   rest/sleep promoted   personal protective equipment utilized  Goal: Optimal Comfort and Wellbeing  Outcome: Ongoing, Progressing  Intervention: Monitor Pain and Promote Comfort  Flowsheets (Taken 2/23/2024 1918)  Pain Management Interventions:   care clustered   pillow support provided   position adjusted   pain management plan reviewed with patient/caregiver   warm blanket provided  Intervention: Provide Person-Centered Care  Flowsheets (Taken 2/23/2024 1918)  Trust Relationship/Rapport:   care explained   questions encouraged   questions answered  Goal: Readiness for Transition of Care  Outcome: Ongoing, Progressing     Problem: Diabetes Comorbidity  Goal: Blood Glucose Level Within Targeted Range  Outcome: Ongoing, Progressing  Intervention: Monitor and Manage Glycemia  Flowsheets (Taken 2/23/2024 1918)  Glycemic Management:   blood glucose monitored   oral hydration promoted     Problem: Impaired Wound Healing  Goal: Optimal Wound Healing  Outcome: Ongoing, Progressing  Intervention: Promote Wound Healing  Flowsheets (Taken 2/23/2024 1918)  Oral Nutrition Promotion:   calorie-dense foods provided   calorie-dense liquids provided  Activity Management: Rolling - L1  Pain Management Interventions:   care clustered   pillow  support provided   position adjusted   pain management plan reviewed with patient/caregiver   warm blanket provided

## 2024-02-24 NOTE — PLAN OF CARE
"  Problem: Skin Injury Risk Increased  Goal: Skin Health and Integrity  Outcome: Ongoing, Progressing  Intervention: Optimize Skin Protection  Flowsheets (Taken 2/23/2024 2200)  Pressure Reduction Techniques:   pressure points protected   positioned off wounds   weight shift assistance provided  Pressure Reduction Devices:   heel offloading device utilized   positioning supports utilized  Skin Protection:   adhesive use limited   incontinence pads utilized   tubing/devices free from skin contact  Head of Bed (HOB) Positioning: HOB at 20-30 degrees  Intervention: Promote and Optimize Oral Intake  Flowsheets (Taken 2/23/2024 2200)  Oral Nutrition Promotion:   calorie-dense foods provided   calorie-dense liquids provided     Problem: Adult Inpatient Plan of Care  Goal: Plan of Care Review  Outcome: Ongoing, Progressing  Flowsheets (Taken 2/23/2024 2200)  Plan of Care Reviewed With:   patient   significant other  Goal: Patient-Specific Goal (Individualized)  Outcome: Ongoing, Progressing  Flowsheets (Taken 2/23/2024 2200)  Anxieties, Fears or Concerns: "Been in bed since I left here the last time, only out to go to wound care".  Individualized Care Needs:   Turn Q2 hours   Monitor for s/s of worsening infection   Wound care/management   Antibiotic therapy as ordered   Anticipate patient needs, rounding often.  Goal: Absence of Hospital-Acquired Illness or Injury  Outcome: Ongoing, Progressing  Intervention: Identify and Manage Fall Risk  Flowsheets (Taken 2/23/2024 2200)  Safety Promotion/Fall Prevention:   bed alarm set   assistive device/personal item within reach   Fall Risk reviewed with patient/family   Fall Risk signage in place   family to remain at bedside   medications reviewed   lighting adjusted   room near unit station   side rails raised x 3  Intervention: Prevent Skin Injury  Flowsheets (Taken 2/23/2024 2200)  Body Position:   side-lying   right   weight shifting  Skin Protection:   adhesive use limited   " incontinence pads utilized   tubing/devices free from skin contact  Intervention: Prevent and Manage VTE (Venous Thromboembolism) Risk  Flowsheets (Taken 2/23/2024 2200)  Activity Management: Rolling - L1  Range of Motion: ROM (range of motion) performed  Intervention: Prevent Infection  Flowsheets (Taken 2/23/2024 2200)  Infection Prevention:   environmental surveillance performed   equipment surfaces disinfected   hand hygiene promoted   personal protective equipment utilized   rest/sleep promoted  Goal: Optimal Comfort and Wellbeing  Outcome: Ongoing, Progressing  Intervention: Monitor Pain and Promote Comfort  Flowsheets (Taken 2/23/2024 2200)  Pain Management Interventions:   pain management plan reviewed with patient/caregiver   position adjusted   quiet environment facilitated   pillow support provided   care clustered  Intervention: Provide Person-Centered Care  Flowsheets (Taken 2/23/2024 2200)  Trust Relationship/Rapport:   care explained   choices provided   emotional support provided   empathic listening provided   questions answered   questions encouraged   reassurance provided   thoughts/feelings acknowledged     Problem: Diabetes Comorbidity  Goal: Blood Glucose Level Within Targeted Range  Outcome: Ongoing, Progressing  Intervention: Monitor and Manage Glycemia  Flowsheets (Taken 2/23/2024 2200)  Glycemic Management:   blood glucose monitored   supplemental insulin given     Problem: Impaired Wound Healing  Goal: Optimal Wound Healing  Outcome: Ongoing, Progressing  Intervention: Promote Wound Healing  Flowsheets (Taken 2/23/2024 2200)  Oral Nutrition Promotion:   calorie-dense foods provided   calorie-dense liquids provided  Sleep/Rest Enhancement:   regular sleep/rest pattern promoted   room darkened   noise level reduced   awakenings minimized  Activity Management: Rolling - L1  Pain Management Interventions:   pain management plan reviewed with patient/caregiver   position adjusted   quiet  environment facilitated   pillow support provided   care clustered

## 2024-02-25 LAB
ALBUMIN SERPL-MCNC: 2 G/DL (ref 3.5–5)
ALBUMIN/GLOB SERPL: 0.4 RATIO (ref 1.1–2)
ALP SERPL-CCNC: 106 UNIT/L (ref 40–150)
ALT SERPL-CCNC: 15 UNIT/L (ref 0–55)
AST SERPL-CCNC: 10 UNIT/L (ref 5–34)
BASOPHILS # BLD AUTO: 0.02 X10(3)/MCL
BASOPHILS NFR BLD AUTO: 0.2 %
BILIRUB SERPL-MCNC: 0.2 MG/DL
BUN SERPL-MCNC: 27.4 MG/DL (ref 8.4–25.7)
CALCIUM SERPL-MCNC: 9.1 MG/DL (ref 8.4–10.2)
CHLORIDE SERPL-SCNC: 105 MMOL/L (ref 98–107)
CO2 SERPL-SCNC: 19 MMOL/L (ref 22–29)
CREAT SERPL-MCNC: 1 MG/DL (ref 0.73–1.18)
EOSINOPHIL # BLD AUTO: 0.29 X10(3)/MCL (ref 0–0.9)
EOSINOPHIL NFR BLD AUTO: 2.7 %
ERYTHROCYTE [DISTWIDTH] IN BLOOD BY AUTOMATED COUNT: 15.4 % (ref 11.5–17)
GFR SERPLBLD CREATININE-BSD FMLA CKD-EPI: >60 MLS/MIN/1.73/M2
GLOBULIN SER-MCNC: 4.8 GM/DL (ref 2.4–3.5)
GLUCOSE SERPL-MCNC: 213 MG/DL (ref 74–100)
HCT VFR BLD AUTO: 27 % (ref 42–52)
HGB BLD-MCNC: 8.4 G/DL (ref 14–18)
IMM GRANULOCYTES # BLD AUTO: 0.07 X10(3)/MCL (ref 0–0.04)
IMM GRANULOCYTES NFR BLD AUTO: 0.7 %
LYMPHOCYTES # BLD AUTO: 1.97 X10(3)/MCL (ref 0.6–4.6)
LYMPHOCYTES NFR BLD AUTO: 18.5 %
MAGNESIUM SERPL-MCNC: 1.8 MG/DL (ref 1.6–2.6)
MCH RBC QN AUTO: 26.1 PG (ref 27–31)
MCHC RBC AUTO-ENTMCNC: 31.1 G/DL (ref 33–36)
MCV RBC AUTO: 83.9 FL (ref 80–94)
MONOCYTES # BLD AUTO: 0.64 X10(3)/MCL (ref 0.1–1.3)
MONOCYTES NFR BLD AUTO: 6 %
NEUTROPHILS # BLD AUTO: 7.66 X10(3)/MCL (ref 2.1–9.2)
NEUTROPHILS NFR BLD AUTO: 71.9 %
PHOSPHATE SERPL-MCNC: 3.4 MG/DL (ref 2.3–4.7)
PLATELET # BLD AUTO: 332 X10(3)/MCL (ref 130–400)
PMV BLD AUTO: 9.8 FL (ref 7.4–10.4)
POCT GLUCOSE: 202 MG/DL (ref 70–110)
POCT GLUCOSE: 265 MG/DL (ref 70–110)
POCT GLUCOSE: 302 MG/DL (ref 70–110)
POCT GLUCOSE: 315 MG/DL (ref 70–110)
POTASSIUM SERPL-SCNC: 4.9 MMOL/L (ref 3.5–5.1)
PROT SERPL-MCNC: 6.8 GM/DL (ref 6.4–8.3)
RBC # BLD AUTO: 3.22 X10(6)/MCL (ref 4.7–6.1)
SODIUM SERPL-SCNC: 134 MMOL/L (ref 136–145)
WBC # SPEC AUTO: 10.65 X10(3)/MCL (ref 4.5–11.5)

## 2024-02-25 PROCEDURE — 83735 ASSAY OF MAGNESIUM: CPT | Performed by: INTERNAL MEDICINE

## 2024-02-25 PROCEDURE — 80053 COMPREHEN METABOLIC PANEL: CPT | Performed by: INTERNAL MEDICINE

## 2024-02-25 PROCEDURE — 25000003 PHARM REV CODE 250: Performed by: INTERNAL MEDICINE

## 2024-02-25 PROCEDURE — 85025 COMPLETE CBC W/AUTO DIFF WBC: CPT | Performed by: INTERNAL MEDICINE

## 2024-02-25 PROCEDURE — 63600175 PHARM REV CODE 636 W HCPCS: Performed by: INTERNAL MEDICINE

## 2024-02-25 PROCEDURE — 84100 ASSAY OF PHOSPHORUS: CPT | Performed by: INTERNAL MEDICINE

## 2024-02-25 PROCEDURE — 25000003 PHARM REV CODE 250: Performed by: EMERGENCY MEDICINE

## 2024-02-25 PROCEDURE — 63600175 PHARM REV CODE 636 W HCPCS: Performed by: EMERGENCY MEDICINE

## 2024-02-25 PROCEDURE — 94760 N-INVAS EAR/PLS OXIMETRY 1: CPT

## 2024-02-25 PROCEDURE — 11000001 HC ACUTE MED/SURG PRIVATE ROOM

## 2024-02-25 PROCEDURE — 99900035 HC TECH TIME PER 15 MIN (STAT)

## 2024-02-25 RX ADMIN — CALCIUM CARBONATE (ANTACID) CHEW TAB 500 MG 1000 MG: 500 CHEW TAB at 11:02

## 2024-02-25 RX ADMIN — SODIUM CHLORIDE: 9 INJECTION, SOLUTION INTRAVENOUS at 05:02

## 2024-02-25 RX ADMIN — INSULIN ASPART 6 UNITS: 100 INJECTION, SOLUTION INTRAVENOUS; SUBCUTANEOUS at 05:02

## 2024-02-25 RX ADMIN — INSULIN ASPART 4 UNITS: 100 INJECTION, SOLUTION INTRAVENOUS; SUBCUTANEOUS at 06:02

## 2024-02-25 RX ADMIN — COLLAGENASE SANTYL: 250 OINTMENT TOPICAL at 09:02

## 2024-02-25 RX ADMIN — ACETAMINOPHEN 650 MG: 325 TABLET ORAL at 09:02

## 2024-02-25 RX ADMIN — LOPERAMIDE HYDROCHLORIDE 2 MG: 2 CAPSULE ORAL at 02:02

## 2024-02-25 RX ADMIN — LEVOFLOXACIN 750 MG: 750 INJECTION, SOLUTION INTRAVENOUS at 05:02

## 2024-02-25 RX ADMIN — PANTOPRAZOLE SODIUM 40 MG: 40 TABLET, DELAYED RELEASE ORAL at 09:02

## 2024-02-25 RX ADMIN — Medication 6 MG: at 01:02

## 2024-02-25 RX ADMIN — HYDROCODONE BITARTRATE AND ACETAMINOPHEN 1 TABLET: 5; 325 TABLET ORAL at 02:02

## 2024-02-25 RX ADMIN — CETIRIZINE HYDROCHLORIDE 10 MG: 10 TABLET, FILM COATED ORAL at 09:02

## 2024-02-25 RX ADMIN — ONDANSETRON 4 MG: 2 INJECTION INTRAMUSCULAR; INTRAVENOUS at 11:02

## 2024-02-25 RX ADMIN — OXYCODONE HYDROCHLORIDE AND ACETAMINOPHEN 500 MG: 500 TABLET ORAL at 09:02

## 2024-02-25 RX ADMIN — Medication 6 MG: at 09:02

## 2024-02-25 RX ADMIN — ASPIRIN 81 MG: 81 TABLET, COATED ORAL at 09:02

## 2024-02-25 RX ADMIN — HYDROCODONE BITARTRATE AND ACETAMINOPHEN 1 TABLET: 5; 325 TABLET ORAL at 11:02

## 2024-02-25 RX ADMIN — INSULIN ASPART 4 UNITS: 100 INJECTION, SOLUTION INTRAVENOUS; SUBCUTANEOUS at 09:02

## 2024-02-25 RX ADMIN — INSULIN ASPART 6 UNITS: 100 INJECTION, SOLUTION INTRAVENOUS; SUBCUTANEOUS at 12:02

## 2024-02-25 RX ADMIN — SITAGLIPTIN 100 MG: 50 TABLET, FILM COATED ORAL at 09:02

## 2024-02-25 RX ADMIN — FERROUS SULFATE TAB 325 MG (65 MG ELEMENTAL FE) 1 EACH: 325 (65 FE) TAB at 09:02

## 2024-02-25 NOTE — PLAN OF CARE
Problem: Skin Injury Risk Increased  Goal: Skin Health and Integrity  Outcome: Ongoing, Progressing  Intervention: Optimize Skin Protection  Flowsheets (Taken 2/24/2024 1945)  Pressure Reduction Techniques:   weight shift assistance provided   heels elevated off bed   pressure points protected   positioned off wounds  Pressure Reduction Devices:   heel offloading device utilized   positioning supports utilized   specialty bed utilized   elbow protectors utilized  Skin Protection:   adhesive use limited   incontinence pads utilized   tubing/devices free from skin contact  Head of Bed (HOB) Positioning: HOB at 20-30 degrees  Intervention: Promote and Optimize Oral Intake  Flowsheets (Taken 2/24/2024 1945)  Oral Nutrition Promotion:   calorie-dense foods provided   calorie-dense liquids provided     Problem: Adult Inpatient Plan of Care  Goal: Plan of Care Review  Outcome: Ongoing, Progressing  Flowsheets (Taken 2/24/2024 1945)  Plan of Care Reviewed With:   patient   sibling  Goal: Patient-Specific Goal (Individualized)  Outcome: Ongoing, Progressing  Flowsheets (Taken 2/24/2024 1945)  Anxieties, Fears or Concerns: Sleeping tonight  Individualized Care Needs:   Antibiotic therapy as ordered   Wound care/management as ordered   Antibiotic therapy   Monitor blood glucose levels   Monitor for s/s of worsening infection.  Goal: Absence of Hospital-Acquired Illness or Injury  Outcome: Ongoing, Progressing  Intervention: Identify and Manage Fall Risk  Flowsheets (Taken 2/24/2024 1945)  Safety Promotion/Fall Prevention:   bed alarm set   Fall Risk reviewed with patient/family   side rails raised x 2   Fall Risk signage in place   family to remain at bedside   high risk medications identified   medications reviewed   lighting adjusted   room near unit station  Intervention: Prevent Skin Injury  Flowsheets (Taken 2/24/2024 1945)  Body Position:   turned   right   weight shifting   neutral head position  Skin Protection:    adhesive use limited   incontinence pads utilized   tubing/devices free from skin contact  Intervention: Prevent and Manage VTE (Venous Thromboembolism) Risk  Flowsheets (Taken 2/24/2024 1945)  Activity Management: Rolling - L1  VTE Prevention/Management:   bleeding risk assessed   bleeding precations maintained   fluids promoted   ROM (passive) performed  Range of Motion: ROM (range of motion) performed  Intervention: Prevent Infection  Flowsheets (Taken 2/24/2024 1945)  Infection Prevention:   environmental surveillance performed   equipment surfaces disinfected   hand hygiene promoted   personal protective equipment utilized   rest/sleep promoted  Goal: Optimal Comfort and Wellbeing  Outcome: Ongoing, Progressing  Intervention: Monitor Pain and Promote Comfort  Flowsheets (Taken 2/24/2024 1945)  Pain Management Interventions:   care clustered   pain management plan reviewed with patient/caregiver   medication offered   position adjusted   pillow support provided   quiet environment facilitated  Intervention: Provide Person-Centered Care  Flowsheets (Taken 2/24/2024 1945)  Trust Relationship/Rapport:   care explained   choices provided   emotional support provided   empathic listening provided   questions answered   questions encouraged   reassurance provided     Problem: Diabetes Comorbidity  Goal: Blood Glucose Level Within Targeted Range  Outcome: Ongoing, Progressing  Intervention: Monitor and Manage Glycemia  Flowsheets (Taken 2/24/2024 1945)  Glycemic Management:   blood glucose monitored   supplemental insulin given     Problem: Impaired Wound Healing  Goal: Optimal Wound Healing  Outcome: Ongoing, Progressing  Intervention: Promote Wound Healing  Flowsheets (Taken 2/24/2024 1945)  Oral Nutrition Promotion:   calorie-dense foods provided   calorie-dense liquids provided  Sleep/Rest Enhancement:   regular sleep/rest pattern promoted   room darkened   noise level reduced   awakenings minimized  Activity  Management: Rolling - L1  Pain Management Interventions:   care clustered   pain management plan reviewed with patient/caregiver   medication offered   position adjusted   pillow support provided   quiet environment facilitated     Problem: Fall Injury Risk  Goal: Absence of Fall and Fall-Related Injury  Outcome: Ongoing, Progressing  Intervention: Identify and Manage Contributors  Flowsheets (Taken 2/24/2024 1945)  Medication Review/Management:   medications reviewed   high-risk medications identified

## 2024-02-25 NOTE — PROGRESS NOTES
[facility] - Medical Surgical Unit  [facility] MEDICINE - Progress Note      Patient Name: Antonio Galvan II  MRN: [id number]  Patient Class: IP- Inpatient   Admission Date: 2/23/2024   Admitting Physician:  Service   Attending Physician: Jeanette Mann MD  Primary Care Provider: Jennifer Fernando NP  Face-to-Face encounter date: 02/25/2024        CHIEF COMPLAINT     Chief Complaint   Patient presents with    Wound Infection     Pt with wound to rosaura feet and sacrum with fever today        HISTORY OF PRESENTING ILLNESS     This 58-year-old man with quadriplegic spinal paralysis and numerous decubitus ulcers who is followed by wound care is brought in today due to fever. He had wound cultures done 3 days ago that have grown Proteus mirabilis and Klebsiella pneumoniae. Sensitivities are not complete. The wounds were noted to have significant odor and heavy green drainage. In addition the patient had some nausea and vomiting and home health noted that his blood pressure was dipping. He was advised to come to the emergency room. Wound cultures were drawn from the right hip wound. His wife reports that when the wound is pressed above pus comes out. Both Klebsiella and Proteus maybe treated with fluoroquinolones which are both IV and oral. The patient's wife preferred if the patient came home so that she could attend to his wounds however the patient expressed a desire to be admitted.    We will admit for IV abx and wound care and a consult to Dr. Jean when he is on campus for possible debridment.    Today:  No acute events overnight.       PAST MEDICAL HISTORY     Past Medical History:   Diagnosis Date    A-fib     Cardiac arrest     7/2022    Chronic skin ulcer     DM (diabetes mellitus)     Infected decubitus ulcer     Lymphedema of leg     Neurogenic bladder     Obesity, unspecified     Pressure ulcer of heel     Pressure ulcer of right foot, stage 3     Pressure ulcer of right ischium     Quadriplegia      Quadriplegic spinal paralysis        PAST SURGICAL HISTORY     Past Surgical History:   Procedure Laterality Date    APPLICATION OF WOUND VACUUM-ASSISTED CLOSURE DEVICE Right 5/12/2023    Procedure: APPLICATION, WOUND VAC;  Surgeon: Keyonna Jean MD;  Location: [facility] OR;  Service: General;  Laterality: Right;    DEBRIDEMENT OF BUTTOCKS Right 5/12/2023    Procedure: DEBRIDEMENT, BUTTOCK;  Surgeon: Keyonna Jean MD;  Location: [facility] OR;  Service: General;  Laterality: Right;    INCISION AND DRAINAGE HIP Bilateral 8/16/2023    Procedure: INCISION AND DRAINAGE, HIP;  Surgeon: Ryan Tirado MD;  Location: [facility] OR;  Service: General;  Laterality: Bilateral;  1. Excisional debridement skin to bone, skin to bone with biopsy and debridement of hard bone at the Left hip necrotic wound 11 cm long 5-1/2 cm wide 4-1/2 cm deep upon completion of debridement   2. Excisional debridement skin to muscle right hip with debridement       FAMILY HISTORY   Reviewed and noncontributory to this case    SOCIAL HISTORY     Social History     Socioeconomic History    Marital status: Single   Tobacco Use    Smoking status: Never    Smokeless tobacco: Never   Substance and Sexual Activity    Alcohol use: Never    Drug use: Never    Sexual activity: Not Currently     Social Determinants of Health     Financial Resource Strain: Low Risk  (2/24/2024)    Overall Financial Resource Strain (CARDIA)     Difficulty of Paying Living Expenses: Not hard at all   Food Insecurity: No Food Insecurity (2/24/2024)    Hunger Vital Sign     Worried About Running Out of Food in the Last Year: Never true     Ran Out of Food in the Last Year: Never true   Transportation Needs: No Transportation Needs (2/24/2024)    PRAPARE - Transportation     Lack of Transportation (Medical): No     Lack of Transportation (Non-Medical): No   Physical Activity: Inactive (2/24/2024)    Exercise Vital Sign     Days of Exercise per Week: 0 days     Minutes of  Exercise per Session: 0 min   Stress: Stress Concern Present (2/24/2024)    [employer] [place] [employer] - Occupational Stress Questionnaire     Feeling of Stress : To some extent   Social Connections: Moderately Integrated (2/24/2024)    Social Connection and Isolation Panel [NHANES]     Frequency of Communication with Friends and Family: More than three times a week     Frequency of Social Gatherings with Friends and Family: More than three times a week     Attends Muslim Services: 1 to 4 times per year     Active Member of Clubs or Organizations: Yes     Attends Club or Organization Meetings: 1 to 4 times per year     Marital Status: Never    Housing Stability: Low Risk  (2/24/2024)    Housing Stability Vital Sign     Unable to Pay for Housing in the Last Year: No     Number of Places Lived in the Last Year: 1     Unstable Housing in the Last Year: No       HOME MEDICATIONS     Prior to Admission medications    Medication Sig Start Date End Date Taking? Authorizing Provider   ascorbic acid, vitamin C, (VITAMIN C) 500 MG tablet Take 1 tablet (500 mg total) by mouth once daily. 9/28/23  Yes Jeanette Mann MD   aspirin (ECOTRIN) 81 MG EC tablet Take 1 tablet (81 mg total) by mouth once daily. 9/28/23 9/27/24 Yes Jeanette Mann MD   bisacodyL (DULCOLAX) 10 mg Supp Place 1 suppository (10 mg total) rectally daily as needed (Until bowel movement if patient has no bowel movement for 2 days). 9/27/23  Yes Jeanette Mann MD   budesonide (PULMICORT) 0.5 mg/2 mL nebulizer solution Take 2 mLs (0.5 mg total) by nebulization daily as needed (Wheezing, SOB). Controller 9/27/23 9/26/24 Yes Jeanette Mann MD   carvediloL (COREG) 12.5 MG tablet Take 1 tablet (12.5 mg total) by mouth 2 (two) times daily with meals. 9/27/23 9/26/24 Yes Jeanette Mann MD   doxycycline (VIBRAMYCIN) 100 MG Cap Take 1 capsule (100 mg total) by mouth 2 (two) times daily. for 10 days 2/20/24 3/1/24 Yes Swapna  Ronald OSORIO MD   ferrous sulfate 325 (65 FE) MG EC tablet Take 1 tablet (325 mg total) by mouth once daily. 9/27/23  Yes Jeanette Mann MD   JANUVIA 50 mg Tab Take 100 mg by mouth once daily. 5/4/22  Yes Provider, Historical   albuterol (PROVENTIL) 2.5 mg /3 mL (0.083 %) nebulizer solution Inhale 2.5 mg into the lungs. 12/8/21 9/28/23  Provider, Historical   ALKALOL NASAL WASH Soln 50 mLs by Nasal route once daily. 8/15/22   Provider, Historical   collagenase (SANTYL) ointment Apply topically once daily.  Patient not taking: Reported on 9/28/2023 5/25/23   Syl Avila MD   insulin detemir U-100 (LEVEMIR) 100 unit/mL injection Inject 7 Units into the skin every evening.  Patient not taking: Reported on 9/28/2023 9/27/23 9/26/24  Jeanette Mann MD   polyethylene glycol (GLYCOLAX) 17 gram PwPk Take 17 g by mouth 3 (three) times daily as needed (nausea vomiting).  Patient not taking: Reported on 9/28/2023 9/27/23   Jeanette Mann MD       ALLERGIES   Patient has no known allergies.          REVIEW OF SYSTEMS   Except as documented above, all other systems reviewed and negative    PHYSICAL EXAM     Vitals:    02/25/24 0634   BP: 106/68   Pulse: 86   Resp:    Temp:       General:  In no acute distress, resting comfortably  Head and neck:  Atraumatic, normocephalic, moist mucous membranes, supple neck  Chest:  Clear to auscultation bilaterally  Heart:  S1, S2, no appreciable murmur  Abdomen:  Soft, nontender, BS +  MSK:  Warm, no lower extremity edema, no clubbing or cyanosis  Neuro:  Alert and oriented x4,   Integumentary:  see wound care photos  Psychiatry:  Appropriate mood and affect          ASSESSMENT AND PLAN   There are no hospital problems to display for this patient.     Infected wounds/sacrum  -continue Levaquin start date February 23rd  -I will consult Dr. Jean for possible    DM  -on sitagliptin we will add insulin sliding scale    Hypotension  -improved    Neurogenic  bladder  -chronic    Quadraplegia  -chronic    DVT prophylaxis:  History of bleeding in recent past.                 __________________________________________________________________  LABS/MICRO/MEDS/DIAGNOSTICS       LABS  Recent Labs     02/25/24  0526   *   K 4.9   CHLORIDE 105   CO2 19*   BUN 27.4*   CREATININE 1.00   GLUCOSE 213*   CALCIUM 9.1   ALKPHOS 106   AST 10   ALT 15   ALBUMIN 2.0*     Recent Labs     02/25/24  0526   WBC 10.65   RBC 3.22*   HCT 27.0*   MCV 83.9          MICROBIOLOGY  Microbiology Results (last 7 days)       Procedure Component Value Units Date/Time    Blood culture #1 **CANNOT BE ORDERED STAT** [[id number]] Collected: 02/23/24 1539    Order Status: Resulted Specimen: Blood from Hand, Left Updated: 02/23/24 1539    Blood culture #2 **CANNOT BE ORDERED STAT** [[id number]] Collected: 02/23/24 1539    Order Status: Resulted Specimen: Blood from Hand, Left Updated: 02/23/24 1539            MEDICATIONS   acetaminophen  650 mg Oral QHS    ascorbic acid (vitamin C)  500 mg Oral Daily    aspirin  81 mg Oral Daily    budesonide  0.5 mg Nebulization Q12H    cetirizine  10 mg Oral Daily    collagenase   Topical (Top) Daily    diphenhydrAMINE  25 mg Oral QHS    ferrous sulfate  1 tablet Oral Daily    levoFLOXacin  750 mg Intravenous Q24H    pantoprazole  40 mg Oral Daily    SITagliptin phosphate  100 mg Oral Daily      INFUSIONS      DIAGNOSTIC TESTS  No orders to display          Patient information was obtained from patient, patient's family, past medical records and ER records.   All diagnosis and differential diagnosis have been reviewed; assessment and plan has been documented. I have personally reviewed the labs and test results that are presently available; I have reviewed the patients medication list. I have reviewed the consulting providers response and recommendations. I have reviewed or attempted to review medical records based upon their availability.  All of the patient's  questions have been addressed and answered. Patient's is agreeable to the above stated plan. I will continue to monitor closely and make adjustments to medical management as needed.  This note was created using [employer] voice recognition software that occasionally misinterpreted phrases or words.  Please contact me if any questions may rise regarding documentation to clarify verbiage.        Jeanette Mann MD   Internal Medicine  Department of Hospital Medicine [facility] - [facility]

## 2024-02-25 NOTE — PLAN OF CARE
Problem: Skin Injury Risk Increased  Goal: Skin Health and Integrity  Outcome: Ongoing, Progressing  Intervention: Optimize Skin Protection  Flowsheets (Taken 2/24/2024 1854)  Pressure Reduction Techniques:   weight shift assistance provided   positioned off wounds   heels elevated off bed  Pressure Reduction Devices:   heel offloading device utilized   positioning supports utilized   specialty bed utilized   pressure-redistributing mattress utilized  Skin Protection:   adhesive use limited   tubing/devices free from skin contact   skin sealant/moisture barrier applied   incontinence pads utilized  Head of Bed (HOB) Positioning: HOB at 30 degrees  Intervention: Promote and Optimize Oral Intake  Flowsheets (Taken 2/24/2024 1854)  Oral Nutrition Promotion:   calorie-dense foods provided   safe use of adaptive equipment encouraged   rest periods promoted     Problem: Adult Inpatient Plan of Care  Goal: Plan of Care Review  Outcome: Ongoing, Progressing  Flowsheets (Taken 2/24/2024 1854)  Plan of Care Reviewed With:   patient   mother  Goal: Patient-Specific Goal (Individualized)  Outcome: Ongoing, Progressing  Flowsheets (Taken 2/24/2024 1854)  Anxieties, Fears or Concerns: length of stay, wound care, abt  Individualized Care Needs: wound care, monitor bp and vs, abt therapy, turn q 2  Goal: Absence of Hospital-Acquired Illness or Injury  Outcome: Ongoing, Progressing  Intervention: Identify and Manage Fall Risk  Flowsheets (Taken 2/24/2024 1854)  Safety Promotion/Fall Prevention:   assistive device/personal item within reach   bed alarm set   family to remain at bedside   instructed to call staff for mobility   room near unit station   medications reviewed  Intervention: Prevent Skin Injury  Flowsheets (Taken 2/24/2024 1854)  Body Position:   turned   heels elevated  Skin Protection:   adhesive use limited   tubing/devices free from skin contact   skin sealant/moisture barrier applied   incontinence pads  utilized  Intervention: Prevent and Manage VTE (Venous Thromboembolism) Risk  Flowsheets (Taken 2/24/2024 1854)  Activity Management: Rolling - L1  VTE Prevention/Management:   bleeding precations maintained   bleeding risk assessed  Intervention: Prevent Infection  Flowsheets (Taken 2/24/2024 1854)  Infection Prevention: single patient room provided  Goal: Optimal Comfort and Wellbeing  Outcome: Ongoing, Progressing  Intervention: Monitor Pain and Promote Comfort  Flowsheets (Taken 2/24/2024 1854)  Pain Management Interventions:   medication offered   premedicated for activity  Intervention: Provide Person-Centered Care  Flowsheets (Taken 2/24/2024 1854)  Trust Relationship/Rapport:   care explained   choices provided   questions answered   questions encouraged   reassurance provided  Goal: Readiness for Transition of Care  Outcome: Ongoing, Progressing     Problem: Diabetes Comorbidity  Goal: Blood Glucose Level Within Targeted Range  Outcome: Ongoing, Progressing  Intervention: Monitor and Manage Glycemia  Flowsheets (Taken 2/24/2024 1854)  Glycemic Management:   blood glucose monitored   supplemental insulin given     Problem: Impaired Wound Healing  Goal: Optimal Wound Healing  Outcome: Ongoing, Progressing  Intervention: Promote Wound Healing  Flowsheets (Taken 2/24/2024 1854)  Oral Nutrition Promotion:   calorie-dense foods provided   safe use of adaptive equipment encouraged   rest periods promoted  Activity Management: Rolling - L1  Pain Management Interventions:   medication offered   premedicated for activity

## 2024-02-26 ENCOUNTER — HOSPITAL ENCOUNTER (INPATIENT)
Facility: HOSPITAL | Age: 59
LOS: 43 days | Discharge: HOME HEALTH | DRG: 592 | End: 2024-04-09
Attending: INTERNAL MEDICINE | Admitting: INTERNAL MEDICINE
Payer: MEDICARE

## 2024-02-26 VITALS
WEIGHT: 207 LBS | SYSTOLIC BLOOD PRESSURE: 121 MMHG | HEIGHT: 67 IN | RESPIRATION RATE: 19 BRPM | DIASTOLIC BLOOD PRESSURE: 70 MMHG | OXYGEN SATURATION: 99 % | TEMPERATURE: 99 F | HEART RATE: 113 BPM | BODY MASS INDEX: 32.49 KG/M2

## 2024-02-26 DIAGNOSIS — L89.90 INFECTED PRESSURE ULCER, UNSPECIFIED PRESSURE ULCER STAGE: Primary | ICD-10-CM

## 2024-02-26 DIAGNOSIS — L89.90: ICD-10-CM

## 2024-02-26 DIAGNOSIS — L08.9: ICD-10-CM

## 2024-02-26 DIAGNOSIS — L08.9 INFECTED PRESSURE ULCER, UNSPECIFIED PRESSURE ULCER STAGE: Primary | ICD-10-CM

## 2024-02-26 LAB
POCT GLUCOSE: 311 MG/DL (ref 70–110)
POCT GLUCOSE: 313 MG/DL (ref 70–110)
POCT GLUCOSE: 351 MG/DL (ref 70–110)
POCT GLUCOSE: 422 MG/DL (ref 70–110)

## 2024-02-26 PROCEDURE — 99900035 HC TECH TIME PER 15 MIN (STAT)

## 2024-02-26 PROCEDURE — 94640 AIRWAY INHALATION TREATMENT: CPT

## 2024-02-26 PROCEDURE — 94760 N-INVAS EAR/PLS OXIMETRY 1: CPT

## 2024-02-26 PROCEDURE — 25000003 PHARM REV CODE 250: Performed by: EMERGENCY MEDICINE

## 2024-02-26 PROCEDURE — 11000004 HC SNF PRIVATE

## 2024-02-26 PROCEDURE — 25000003 PHARM REV CODE 250: Performed by: PHYSICIAN ASSISTANT

## 2024-02-26 PROCEDURE — 63600175 PHARM REV CODE 636 W HCPCS: Performed by: PHYSICIAN ASSISTANT

## 2024-02-26 PROCEDURE — 25000003 PHARM REV CODE 250: Performed by: INTERNAL MEDICINE

## 2024-02-26 PROCEDURE — 25000242 PHARM REV CODE 250 ALT 637 W/ HCPCS: Performed by: EMERGENCY MEDICINE

## 2024-02-26 PROCEDURE — 63600175 PHARM REV CODE 636 W HCPCS: Performed by: INTERNAL MEDICINE

## 2024-02-26 PROCEDURE — 25000242 PHARM REV CODE 250 ALT 637 W/ HCPCS: Performed by: INTERNAL MEDICINE

## 2024-02-26 RX ORDER — ASCORBIC ACID 500 MG
500 TABLET ORAL DAILY
Status: DISCONTINUED | OUTPATIENT
Start: 2024-02-27 | End: 2024-04-09 | Stop reason: HOSPADM

## 2024-02-26 RX ORDER — GLUCAGON 1 MG
1 KIT INJECTION
Status: CANCELLED | OUTPATIENT
Start: 2024-02-26

## 2024-02-26 RX ORDER — BUDESONIDE 0.5 MG/2ML
0.5 INHALANT ORAL EVERY 12 HOURS
Status: DISCONTINUED | OUTPATIENT
Start: 2024-02-26 | End: 2024-02-27

## 2024-02-26 RX ORDER — LIDOCAINE HYDROCHLORIDE 20 MG/ML
JELLY TOPICAL DAILY PRN
Status: DISCONTINUED | OUTPATIENT
Start: 2024-02-26 | End: 2024-02-26 | Stop reason: HOSPADM

## 2024-02-26 RX ORDER — CLONIDINE HYDROCHLORIDE 0.1 MG/1
0.1 TABLET ORAL EVERY 4 HOURS PRN
Status: CANCELLED | OUTPATIENT
Start: 2024-02-26

## 2024-02-26 RX ORDER — METOPROLOL TARTRATE 1 MG/ML
5 INJECTION, SOLUTION INTRAVENOUS EVERY 5 MIN PRN
Status: CANCELLED | OUTPATIENT
Start: 2024-02-26

## 2024-02-26 RX ORDER — SIMETHICONE 80 MG
1 TABLET,CHEWABLE ORAL 4 TIMES DAILY PRN
Status: DISCONTINUED | OUTPATIENT
Start: 2024-02-26 | End: 2024-04-09 | Stop reason: HOSPADM

## 2024-02-26 RX ORDER — LANOLIN ALCOHOL/MO/W.PET/CERES
1 CREAM (GRAM) TOPICAL DAILY
Status: DISCONTINUED | OUTPATIENT
Start: 2024-02-27 | End: 2024-04-09 | Stop reason: HOSPADM

## 2024-02-26 RX ORDER — ZOLPIDEM TARTRATE 5 MG/1
5 TABLET ORAL NIGHTLY PRN
Status: CANCELLED | OUTPATIENT
Start: 2024-02-26

## 2024-02-26 RX ORDER — DIPHENHYDRAMINE HYDROCHLORIDE 50 MG/ML
25 INJECTION INTRAMUSCULAR; INTRAVENOUS EVERY 6 HOURS PRN
Status: CANCELLED | OUTPATIENT
Start: 2024-02-26

## 2024-02-26 RX ORDER — LOPERAMIDE HYDROCHLORIDE 2 MG/1
2 CAPSULE ORAL 4 TIMES DAILY PRN
Status: CANCELLED | OUTPATIENT
Start: 2024-02-26

## 2024-02-26 RX ORDER — ALBUTEROL SULFATE 0.83 MG/ML
2.5 SOLUTION RESPIRATORY (INHALATION) EVERY 4 HOURS PRN
Status: DISCONTINUED | OUTPATIENT
Start: 2024-02-26 | End: 2024-04-09 | Stop reason: HOSPADM

## 2024-02-26 RX ORDER — BENZONATATE 100 MG/1
100 CAPSULE ORAL 3 TIMES DAILY PRN
Status: CANCELLED | OUTPATIENT
Start: 2024-02-26

## 2024-02-26 RX ORDER — METOPROLOL TARTRATE 1 MG/ML
5 INJECTION, SOLUTION INTRAVENOUS EVERY 5 MIN PRN
Status: DISCONTINUED | OUTPATIENT
Start: 2024-02-26 | End: 2024-04-09 | Stop reason: HOSPADM

## 2024-02-26 RX ORDER — INSULIN ASPART 100 [IU]/ML
0-10 INJECTION, SOLUTION INTRAVENOUS; SUBCUTANEOUS
Status: CANCELLED | OUTPATIENT
Start: 2024-02-26

## 2024-02-26 RX ORDER — IBUPROFEN 200 MG
24 TABLET ORAL
Status: DISCONTINUED | OUTPATIENT
Start: 2024-02-26 | End: 2024-04-09 | Stop reason: HOSPADM

## 2024-02-26 RX ORDER — ACETAMINOPHEN 325 MG/1
650 TABLET ORAL EVERY 4 HOURS PRN
Status: CANCELLED | OUTPATIENT
Start: 2024-02-26

## 2024-02-26 RX ORDER — BENZONATATE 100 MG/1
100 CAPSULE ORAL 3 TIMES DAILY PRN
Status: DISCONTINUED | OUTPATIENT
Start: 2024-02-26 | End: 2024-04-09 | Stop reason: HOSPADM

## 2024-02-26 RX ORDER — SODIUM CHLORIDE 9 MG/ML
INJECTION, SOLUTION INTRAVENOUS
Status: DISCONTINUED | OUTPATIENT
Start: 2024-02-26 | End: 2024-04-09 | Stop reason: HOSPADM

## 2024-02-26 RX ORDER — HYDRALAZINE HYDROCHLORIDE 20 MG/ML
10 INJECTION INTRAMUSCULAR; INTRAVENOUS EVERY 4 HOURS PRN
Status: DISCONTINUED | OUTPATIENT
Start: 2024-02-26 | End: 2024-04-09 | Stop reason: HOSPADM

## 2024-02-26 RX ORDER — SIMETHICONE 80 MG
1 TABLET,CHEWABLE ORAL 4 TIMES DAILY PRN
Status: CANCELLED | OUTPATIENT
Start: 2024-02-26

## 2024-02-26 RX ORDER — TALC
6 POWDER (GRAM) TOPICAL NIGHTLY PRN
Status: DISCONTINUED | OUTPATIENT
Start: 2024-02-26 | End: 2024-03-22

## 2024-02-26 RX ORDER — LOPERAMIDE HYDROCHLORIDE 2 MG/1
2 CAPSULE ORAL 4 TIMES DAILY PRN
Status: DISCONTINUED | OUTPATIENT
Start: 2024-02-26 | End: 2024-03-16

## 2024-02-26 RX ORDER — ONDANSETRON HYDROCHLORIDE 2 MG/ML
4 INJECTION, SOLUTION INTRAVENOUS EVERY 6 HOURS PRN
Status: CANCELLED | OUTPATIENT
Start: 2024-02-26

## 2024-02-26 RX ORDER — ASPIRIN 81 MG/1
81 TABLET ORAL DAILY
Status: CANCELLED | OUTPATIENT
Start: 2024-02-27

## 2024-02-26 RX ORDER — CETIRIZINE HYDROCHLORIDE 10 MG/1
10 TABLET ORAL DAILY
Status: DISCONTINUED | OUTPATIENT
Start: 2024-02-27 | End: 2024-04-09 | Stop reason: HOSPADM

## 2024-02-26 RX ORDER — CETIRIZINE HYDROCHLORIDE 10 MG/1
10 TABLET ORAL DAILY
Status: CANCELLED | OUTPATIENT
Start: 2024-02-27

## 2024-02-26 RX ORDER — ONDANSETRON HYDROCHLORIDE 2 MG/ML
4 INJECTION, SOLUTION INTRAVENOUS EVERY 6 HOURS PRN
Status: DISCONTINUED | OUTPATIENT
Start: 2024-02-26 | End: 2024-04-09 | Stop reason: HOSPADM

## 2024-02-26 RX ORDER — INSULIN ASPART 100 [IU]/ML
0-10 INJECTION, SOLUTION INTRAVENOUS; SUBCUTANEOUS
Status: DISCONTINUED | OUTPATIENT
Start: 2024-02-26 | End: 2024-03-23 | Stop reason: SDUPTHER

## 2024-02-26 RX ORDER — DIPHENHYDRAMINE HYDROCHLORIDE 50 MG/ML
25 INJECTION INTRAMUSCULAR; INTRAVENOUS EVERY 6 HOURS PRN
Status: DISCONTINUED | OUTPATIENT
Start: 2024-02-26 | End: 2024-04-09 | Stop reason: HOSPADM

## 2024-02-26 RX ORDER — CALCIUM CARBONATE 200(500)MG
1000 TABLET,CHEWABLE ORAL 3 TIMES DAILY PRN
Status: CANCELLED | OUTPATIENT
Start: 2024-02-26

## 2024-02-26 RX ORDER — HYDROCODONE BITARTRATE AND ACETAMINOPHEN 5; 325 MG/1; MG/1
1 TABLET ORAL EVERY 4 HOURS PRN
Status: CANCELLED | OUTPATIENT
Start: 2024-02-26

## 2024-02-26 RX ORDER — ACETAMINOPHEN 325 MG/1
650 TABLET ORAL NIGHTLY
Status: DISCONTINUED | OUTPATIENT
Start: 2024-02-26 | End: 2024-04-09 | Stop reason: HOSPADM

## 2024-02-26 RX ORDER — HYDRALAZINE HYDROCHLORIDE 20 MG/ML
10 INJECTION INTRAMUSCULAR; INTRAVENOUS EVERY 4 HOURS PRN
Status: CANCELLED | OUTPATIENT
Start: 2024-02-26

## 2024-02-26 RX ORDER — ZOLPIDEM TARTRATE 5 MG/1
5 TABLET ORAL NIGHTLY PRN
Status: DISCONTINUED | OUTPATIENT
Start: 2024-02-26 | End: 2024-04-09 | Stop reason: HOSPADM

## 2024-02-26 RX ORDER — ASCORBIC ACID 500 MG
500 TABLET ORAL DAILY
Status: CANCELLED | OUTPATIENT
Start: 2024-02-27

## 2024-02-26 RX ORDER — BISACODYL 5 MG
5 TABLET, DELAYED RELEASE (ENTERIC COATED) ORAL DAILY PRN
Status: DISCONTINUED | OUTPATIENT
Start: 2024-02-26 | End: 2024-04-09 | Stop reason: HOSPADM

## 2024-02-26 RX ORDER — IBUPROFEN 200 MG
16 TABLET ORAL
Status: DISCONTINUED | OUTPATIENT
Start: 2024-02-26 | End: 2024-04-09 | Stop reason: HOSPADM

## 2024-02-26 RX ORDER — ASPIRIN 81 MG/1
81 TABLET ORAL DAILY
Status: DISCONTINUED | OUTPATIENT
Start: 2024-02-27 | End: 2024-02-28

## 2024-02-26 RX ORDER — PANTOPRAZOLE SODIUM 40 MG/1
40 TABLET, DELAYED RELEASE ORAL DAILY
Status: CANCELLED | OUTPATIENT
Start: 2024-02-27

## 2024-02-26 RX ORDER — BISACODYL 5 MG
5 TABLET, DELAYED RELEASE (ENTERIC COATED) ORAL DAILY PRN
Status: CANCELLED | OUTPATIENT
Start: 2024-02-26

## 2024-02-26 RX ORDER — SODIUM CHLORIDE 9 MG/ML
INJECTION, SOLUTION INTRAVENOUS
Status: CANCELLED | OUTPATIENT
Start: 2024-02-26

## 2024-02-26 RX ORDER — ALBUTEROL SULFATE 0.83 MG/ML
2.5 SOLUTION RESPIRATORY (INHALATION) EVERY 4 HOURS PRN
Status: CANCELLED | OUTPATIENT
Start: 2024-02-26

## 2024-02-26 RX ORDER — TALC
6 POWDER (GRAM) TOPICAL NIGHTLY PRN
Status: CANCELLED | OUTPATIENT
Start: 2024-02-26

## 2024-02-26 RX ORDER — IBUPROFEN 200 MG
16 TABLET ORAL
Status: CANCELLED | OUTPATIENT
Start: 2024-02-26

## 2024-02-26 RX ORDER — GLUCAGON 1 MG
1 KIT INJECTION
Status: DISCONTINUED | OUTPATIENT
Start: 2024-02-26 | End: 2024-04-09 | Stop reason: HOSPADM

## 2024-02-26 RX ORDER — IBUPROFEN 200 MG
24 TABLET ORAL
Status: CANCELLED | OUTPATIENT
Start: 2024-02-26

## 2024-02-26 RX ORDER — CLONIDINE HYDROCHLORIDE 0.1 MG/1
0.1 TABLET ORAL EVERY 4 HOURS PRN
Status: DISCONTINUED | OUTPATIENT
Start: 2024-02-26 | End: 2024-02-29

## 2024-02-26 RX ORDER — BUDESONIDE 0.5 MG/2ML
0.5 INHALANT ORAL EVERY 12 HOURS
Status: CANCELLED | OUTPATIENT
Start: 2024-02-26

## 2024-02-26 RX ORDER — CALCIUM CARBONATE 200(500)MG
1000 TABLET,CHEWABLE ORAL 3 TIMES DAILY PRN
Status: DISCONTINUED | OUTPATIENT
Start: 2024-02-26 | End: 2024-04-09 | Stop reason: HOSPADM

## 2024-02-26 RX ORDER — LANOLIN ALCOHOL/MO/W.PET/CERES
1 CREAM (GRAM) TOPICAL DAILY
Status: CANCELLED | OUTPATIENT
Start: 2024-02-27

## 2024-02-26 RX ORDER — HYDROCODONE BITARTRATE AND ACETAMINOPHEN 5; 325 MG/1; MG/1
1 TABLET ORAL EVERY 4 HOURS PRN
Status: DISCONTINUED | OUTPATIENT
Start: 2024-02-26 | End: 2024-03-05

## 2024-02-26 RX ORDER — DIPHENHYDRAMINE HCL 25 MG
25 CAPSULE ORAL NIGHTLY
Status: CANCELLED | OUTPATIENT
Start: 2024-02-26

## 2024-02-26 RX ORDER — ACETAMINOPHEN 325 MG/1
650 TABLET ORAL NIGHTLY
Status: CANCELLED | OUTPATIENT
Start: 2024-02-26

## 2024-02-26 RX ORDER — ACETAMINOPHEN 325 MG/1
650 TABLET ORAL EVERY 4 HOURS PRN
Status: DISCONTINUED | OUTPATIENT
Start: 2024-02-26 | End: 2024-04-09 | Stop reason: HOSPADM

## 2024-02-26 RX ORDER — HYDROXYZINE PAMOATE 25 MG/1
25 CAPSULE ORAL EVERY 8 HOURS PRN
Status: DISCONTINUED | OUTPATIENT
Start: 2024-02-26 | End: 2024-04-09 | Stop reason: HOSPADM

## 2024-02-26 RX ORDER — DIPHENHYDRAMINE HCL 25 MG
25 CAPSULE ORAL NIGHTLY
Status: DISCONTINUED | OUTPATIENT
Start: 2024-02-26 | End: 2024-02-29

## 2024-02-26 RX ORDER — PANTOPRAZOLE SODIUM 40 MG/1
40 TABLET, DELAYED RELEASE ORAL DAILY
Status: DISCONTINUED | OUTPATIENT
Start: 2024-02-27 | End: 2024-04-09 | Stop reason: HOSPADM

## 2024-02-26 RX ORDER — HYDROXYZINE PAMOATE 25 MG/1
25 CAPSULE ORAL EVERY 8 HOURS PRN
Status: CANCELLED | OUTPATIENT
Start: 2024-02-26

## 2024-02-26 RX ADMIN — CETIRIZINE HYDROCHLORIDE 10 MG: 10 TABLET, FILM COATED ORAL at 10:02

## 2024-02-26 RX ADMIN — INSULIN ASPART 8 UNITS: 100 INJECTION, SOLUTION INTRAVENOUS; SUBCUTANEOUS at 05:02

## 2024-02-26 RX ADMIN — ASPIRIN 81 MG: 81 TABLET, COATED ORAL at 10:02

## 2024-02-26 RX ADMIN — INSULIN ASPART 4 UNITS: 100 INJECTION, SOLUTION INTRAVENOUS; SUBCUTANEOUS at 09:02

## 2024-02-26 RX ADMIN — CEFEPIME 2 G: 2 INJECTION, POWDER, FOR SOLUTION INTRAVENOUS at 10:02

## 2024-02-26 RX ADMIN — LOPERAMIDE HYDROCHLORIDE 2 MG: 2 CAPSULE ORAL at 04:02

## 2024-02-26 RX ADMIN — PANTOPRAZOLE SODIUM 40 MG: 40 TABLET, DELAYED RELEASE ORAL at 10:02

## 2024-02-26 RX ADMIN — INSULIN ASPART 10 UNITS: 100 INJECTION, SOLUTION INTRAVENOUS; SUBCUTANEOUS at 12:02

## 2024-02-26 RX ADMIN — BUDESONIDE INHALATION 0.5 MG: 0.5 SUSPENSION RESPIRATORY (INHALATION) at 07:02

## 2024-02-26 RX ADMIN — SODIUM CHLORIDE: 9 INJECTION, SOLUTION INTRAVENOUS at 07:02

## 2024-02-26 RX ADMIN — COLLAGENASE SANTYL: 250 OINTMENT TOPICAL at 10:02

## 2024-02-26 RX ADMIN — HYDROCODONE BITARTRATE AND ACETAMINOPHEN 1 TABLET: 5; 325 TABLET ORAL at 10:02

## 2024-02-26 RX ADMIN — SODIUM CHLORIDE: 9 INJECTION, SOLUTION INTRAVENOUS at 10:02

## 2024-02-26 RX ADMIN — CEFEPIME 2 G: 2 INJECTION, POWDER, FOR SOLUTION INTRAVENOUS at 07:02

## 2024-02-26 RX ADMIN — INSULIN DETEMIR 5 UNITS: 100 INJECTION, SOLUTION SUBCUTANEOUS at 10:02

## 2024-02-26 RX ADMIN — ALBUTEROL SULFATE 2.5 MG: 2.5 SOLUTION RESPIRATORY (INHALATION) at 07:02

## 2024-02-26 RX ADMIN — SITAGLIPTIN 100 MG: 50 TABLET, FILM COATED ORAL at 10:02

## 2024-02-26 RX ADMIN — ACETAMINOPHEN 650 MG: 325 TABLET ORAL at 09:02

## 2024-02-26 RX ADMIN — OXYCODONE HYDROCHLORIDE AND ACETAMINOPHEN 500 MG: 500 TABLET ORAL at 10:02

## 2024-02-26 RX ADMIN — Medication 6 MG: at 09:02

## 2024-02-26 RX ADMIN — INSULIN ASPART 10 UNITS: 100 INJECTION, SOLUTION INTRAVENOUS; SUBCUTANEOUS at 04:02

## 2024-02-26 RX ADMIN — FERROUS SULFATE TAB 325 MG (65 MG ELEMENTAL FE) 1 EACH: 325 (65 FE) TAB at 10:02

## 2024-02-26 NOTE — PLAN OF CARE
Problem: Skin Injury Risk Increased  Goal: Skin Health and Integrity  Outcome: Ongoing, Progressing     Problem: Adult Inpatient Plan of Care  Goal: Plan of Care Review  Outcome: Ongoing, Progressing  Flowsheets (Taken 2/25/2024 1859)  Plan of Care Reviewed With:   patient   mother  Goal: Patient-Specific Goal (Individualized)  Outcome: Ongoing, Progressing  Flowsheets (Taken 2/25/2024 1859)  Anxieties, Fears or Concerns: didnt sleep well last night  Individualized Care Needs: says the benadryl/tylenol pm maybe kept him up, will try the melatonin tonight, wound care, IV abt, monitor for bleeding, monitor vs and labs  Goal: Absence of Hospital-Acquired Illness or Injury  Outcome: Ongoing, Progressing  Intervention: Identify and Manage Fall Risk  Flowsheets (Taken 2/25/2024 1859)  Safety Promotion/Fall Prevention:   assistive device/personal item within reach   bed alarm set   medications reviewed   instructed to call staff for mobility   family to remain at bedside   room near unit station  Intervention: Prevent Skin Injury  Flowsheets (Taken 2/25/2024 1859)  Body Position:   sitting up in bed   heels elevated   turned  Skin Protection:   adhesive use limited   tubing/devices free from skin contact  Intervention: Prevent and Manage VTE (Venous Thromboembolism) Risk  Flowsheets (Taken 2/25/2024 1859)  Activity Management:   Rolling - L1   Arm raise - L1  VTE Prevention/Management:   bleeding precations maintained   bleeding risk assessed  Goal: Optimal Comfort and Wellbeing  Outcome: Ongoing, Progressing  Goal: Readiness for Transition of Care  Outcome: Ongoing, Progressing     Problem: Diabetes Comorbidity  Goal: Blood Glucose Level Within Targeted Range  Outcome: Ongoing, Progressing     Problem: Impaired Wound Healing  Goal: Optimal Wound Healing  Outcome: Ongoing, Progressing     Problem: Fall Injury Risk  Goal: Absence of Fall and Fall-Related Injury  Outcome: Ongoing, Progressing

## 2024-02-26 NOTE — CONSULTS
Inpatient Nutrition Evaluation     Admit Date: 2/23/2024   Total duration of encounter: 3 days   Patient Age: 58 y.o.     Nutrition Recommendation/Prescription      Continue diabetic diet. NPO after Mn on 2/27/24 2. Add Arginaid 1 pk BID, for wound healing.  3. Continue ascorbic acid daily 3. Weekly weights 4. Monitor intake, wt, wound, labs, medications.      Nutrition Assessment      Chart Review     Reason Seen: continuous nutrition monitoring and length of stay     Malnutrition Screening Tool Results   Have you recently lost weight without trying?: Yes: 34 lbs or more  Have you been eating poorly because of a decreased appetite?: Yes   MST Score: 5   Diagnosis:  Infected wounds/sacrum  Klebsiella pneumoniae, Proteus mirabilis  DM,   Hypotension  Hx of Neurogenic bladder, Quadriplegia  Relevant Medical History:   A-fib        Cardiac arrest       7/2022    Chronic skin ulcer      DM (diabetes mellitus)      Infected decubitus ulcer      Lymphedema of leg      Neurogenic bladder      Obesity, unspecified      Pressure ulcer of heel      Pressure ulcer of right foot, stage 3      Pressure ulcer of right ischium      Quadriplegia      Quadriplegic spinal paralysis           Scheduled Medications:  acetaminophen, 650 mg, QHS  ascorbic acid (vitamin C), 500 mg, Daily  aspirin, 81 mg, Daily  budesonide, 0.5 mg, Q12H  ceFEPime (MAXIPIME) IVPB EXTENDED INFUSION, 2 g, Q8H  cetirizine, 10 mg, Daily  collagenase, , Daily  diphenhydrAMINE, 25 mg, QHS  ferrous sulfate, 1 tablet, Daily  insulin detemir U-100, 5 Units, Daily  pantoprazole, 40 mg, Daily  SITagliptin phosphate, 100 mg, Daily     Continuous Infusions:   PRN Medications: sodium chloride 0.9%, acetaminophen, albuterol, benzonatate, bisacodyL, calcium carbonate, cloNIDine, dextrose 50% in water (D50W), dextrose 50% in water (D50W), diphenhydrAMINE, glucagon (human recombinant), glucose, glucose, hydrALAZINE, HYDROcodone-acetaminophen, hydrOXYzine pamoate, insulin  "aspart U-100, LIDOcaine HCl 2%, loperamide, melatonin, metoprolol, ondansetron, simethicone, zolpidem            Recent Labs   Lab 02/23/24  1215 02/23/24  1445 02/23/24  1748 02/25/24  0526   NA  --  133*  --  134*   K  --  5.3*  --  4.9   CALCIUM  --  9.6  --  9.1   PHOS  --   --   --  3.4   MG  --   --   --  1.80   CHLORIDE  --  104  --  105   CO2  --  18*  --  19*   BUN  --  26.4*  --  27.4*   CREATININE  --  1.11  --  1.00   EGFRNORACEVR  --  >60  --  >60   GLUCOSE  --  327*  --  213*   BILITOT  --  0.3  --  0.2   ALKPHOS  --  105  --  106   ALT  --  16  --  15   AST  --  8  --  10   ALBUMIN  --  2.4*  --  2.0*   PREALB 14.3*  --   --   --    HGBA1C  --   --  8.5*  --    WBC  --  17.04*  --  10.65   HGB  --  10.3*  --  8.4*   HCT  --  33.3*  --  27.0*      Nutrition Orders:  Diet diabetic  Diet NPO Except for: Medication        Appetite/Oral Intake: good/50-75% of meals  Factors Affecting Nutritional Intake:  infective wounds sacral   Food/Islam/Cultural Preferences: none reported  Food Allergies: none reported  Last Bowel Movement: 02/23/24  Wound(s):       Comments     Pt reports intake is good. Consult for wounds. Provide PA recs. Will monitor. Family present. Pt eating outside food 50%. Will monitor. Pt is NPO after mn for surgery tomorrow.      Anthropometrics     Height: 5' 7" (170.2 cm), Height Method: Stated  Last Weight:  (so the bed dont do the weight) (02/26/24 0531), Weight Method: Bed Scale  BMI (Calculated): 32.4  BMI Classification: obese grade I (BMI 30-34.9)  Ideal Body Weight (IBW), Male: 148 lb  % Ideal Body Weight, Male (lb): 139.86 %  Usual Weight Provided By: unable to obtain usual weight         Wt Readings from Last 5 Encounters:   02/23/24 93.9 kg (207 lb)   08/16/23 106.4 kg (234 lb 9.6 oz)   08/15/23 113.4 kg (250 lb)   08/14/23 108.9 kg (240 lb)   06/23/23 100.7 kg (222 lb 0.1 oz)      Weight Change(s) Since Admission:        Wt Readings from Last 1 Encounters:   02/23/24 8045 " 93.9 kg (207 lb)   02/23/24 1402 99.8 kg (220 lb)   Admit Weight: 99.8 kg (220 lb) (02/23/24 1402), Weight Method: Stated     Patient Education      Not applicable.     Nutrition Goals & Monitoring      Dietitian will monitor: food and beverage intake, weight, weight change, electrolyte/renal panel, glucose/endocrine profile, and gastrointestinal profile     Nutrition Risk/Follow-Up: low (follow-up in 5-7 days)  Patients assigned 'low nutrition risk' status do not qualify for a full nutritional assessment but will be monitored and re-evaluated in a 5-7 day time period. Please consult if re-evaluation needed sooner.

## 2024-02-26 NOTE — PT/OT/SLP PROGRESS
Name: Antonio Galvan II    : 1965 (58 y.o.)  MRN: [id number]           Patient Unavailable      Patient unable to be seen at this time secondary to: Pt is scheduled for surgery tomorrow so PT will wait to complete Evaluation following completion of sx - awaiting any precautions. PT did assist wound care/nursing with bed mobility/positioning during wound measurements/dressing changes; Total A. Eval to be completed once cleared by sx.

## 2024-02-26 NOTE — PLAN OF CARE
Problem: Skin Injury Risk Increased  Goal: Skin Health and Integrity  Outcome: Ongoing, Progressing     Problem: Adult Inpatient Plan of Care  Goal: Plan of Care Review  Outcome: Ongoing, Progressing  Flowsheets (Taken 2/26/2024 1433)  Plan of Care Reviewed With:   patient   family   caregiver  Goal: Patient-Specific Goal (Individualized)  Outcome: Ongoing, Progressing  Flowsheets (Taken 2/26/2024 1433)  Anxieties, Fears or Concerns: feeling anxious about debridement surgery tomorrow  Individualized Care Needs: wound care, IV abt, monitor vs and blood sugar levels  Goal: Absence of Hospital-Acquired Illness or Injury  Outcome: Ongoing, Progressing  Intervention: Identify and Manage Fall Risk  Flowsheets (Taken 2/26/2024 1433)  Safety Promotion/Fall Prevention:   assistive device/personal item within reach   family to remain at bedside   Fall Risk reviewed with patient/family   medications reviewed   room near unit station   instructed to call staff for mobility  Intervention: Prevent Skin Injury  Flowsheets (Taken 2/26/2024 1433)  Body Position:   turned   sitting up in bed   heels elevated  Skin Protection:   adhesive use limited   skin sealant/moisture barrier applied   tubing/devices free from skin contact  Intervention: Prevent and Manage VTE (Venous Thromboembolism) Risk  Flowsheets (Taken 2/26/2024 1433)  Activity Management: Rolling - L1  VTE Prevention/Management:   bleeding precations maintained   bleeding risk assessed  Intervention: Prevent Infection  Flowsheets (Taken 2/26/2024 1433)  Infection Prevention:   hand hygiene promoted   single patient room provided   rest/sleep promoted  Goal: Optimal Comfort and Wellbeing  Outcome: Ongoing, Progressing  Intervention: Monitor Pain and Promote Comfort  Flowsheets (Taken 2/26/2024 1433)  Pain Management Interventions:   pillow support provided   position adjusted   premedicated for activity   quiet environment facilitated   relaxation techniques  promoted  Intervention: Provide Person-Centered Care  Flowsheets (Taken 2/26/2024 1433)  Trust Relationship/Rapport:   care explained   choices provided   emotional support provided   empathic listening provided   questions answered   questions encouraged   reassurance provided   thoughts/feelings acknowledged  Goal: Readiness for Transition of Care  Outcome: Ongoing, Progressing     Problem: Diabetes Comorbidity  Goal: Blood Glucose Level Within Targeted Range  Outcome: Ongoing, Progressing  Intervention: Monitor and Manage Glycemia  Flowsheets (Taken 2/26/2024 1438)  Glycemic Management:   blood glucose monitored   supplemental insulin given     Problem: Impaired Wound Healing  Goal: Optimal Wound Healing  Outcome: Ongoing, Progressing     Problem: Fall Injury Risk  Goal: Absence of Fall and Fall-Related Injury  Outcome: Ongoing, Progressing

## 2024-02-26 NOTE — DISCHARGE SUMMARY
[facility] - [facility]  HOSPITAL MEDICINE - DISCHARGE SUMMARY    Patient Name: Antonio Galvan II  MRN: [id number]  Admission Date: 2/23/2024  Discharge Date: 02/26/2024  Hospital Length of Stay: 3 days  Discharge Provider: ANTHONY Vazquez  Primary Care Provider: Jennifer Fernando NP    HOSPITAL COURSE     This 58-year-old man with quadriplegic spinal paralysis and numerous decubitus ulcers who is followed by wound care is brought in today due to fever. He had wound cultures done 3 days ago that have grown Proteus mirabilis and Klebsiella pneumoniae. Sensitivities are not complete. The wounds were noted to have significant odor and heavy green drainage. In addition the patient had some nausea and vomiting and home health noted that his blood pressure was dipping. He was advised to come to the emergency room. Wound cultures were drawn from the right hip wound. His wife reports that when the wound is pressed above pus comes out. Both Klebsiella and Proteus maybe treated with fluoroquinolones which are both IV and oral. The patient's wife preferred if the patient came home so that she could attend to his wounds however the patient expressed a desire to be admitted.     We will admit for IV abx and wound care and a consult to Dr. Jean when he is on campus for possible debridment.     No reported complaints today.  Levaquin was discontinued and cefepime initiated based off of culture sensitivity report.  Patient will be discharged to our swing unit today.    PHYSICAL EXAM     Most Recent Vital Signs:  Temp: 98.1 °F (36.7 °C) (02/26/24 0830)  Pulse: 95 (02/26/24 0830)  Resp: 18 (02/26/24 1005)  BP: (!) 146/81 (02/26/24 0830)  SpO2: 100 % (02/26/24 0830)     GENERAL: In no acute distress, afebrile  HEENT:  Normocephalic, atraumatic, moist mucous membranes  CHEST: Clear to auscultation bilaterally  HEART: S1, S2, no appreciable murmur  ABDOMEN: Soft, nontender, BS +  MSK: Warm, no lower extremity  edema, no clubbing or cyanosis  NEUROLOGIC: Alert and oriented x4   INTEGUMENTARY:  See media for wound does  PSYCHIATRY:  Appropriate mood and affect    DISCHARGE DIAGNOSIS     Infected wounds/sacrum  Klebsiella pneumoniae, Proteus mirabilis  Discontinue Levaquin based off of sensitivity report ([id number])  Start cefepime 2g Q8  Consult Dr. Jean for possible debridement     DM  Continue home Sitagliptin  Add detemir 5 units q.a.m.  Moderate dose SSI     Hypotension - resolved      Hx of: Neurogenic bladder, Quadriplegia  _____________________________________________________________________________    DISCHARGE MED REC     Current Discharge Medication List        CONTINUE these medications which have NOT CHANGED    Details   ascorbic acid, vitamin C, (VITAMIN C) 500 MG tablet Take 1 tablet (500 mg total) by mouth once daily.  Qty: 30 tablet, Refills: 0      aspirin (ECOTRIN) 81 MG EC tablet Take 1 tablet (81 mg total) by mouth once daily.  Qty: 360 tablet, Refills: 0      bisacodyL (DULCOLAX) 10 mg Supp Place 1 suppository (10 mg total) rectally daily as needed (Until bowel movement if patient has no bowel movement for 2 days).  Qty: 30 suppository, Refills: 0      budesonide (PULMICORT) 0.5 mg/2 mL nebulizer solution Take 2 mLs (0.5 mg total) by nebulization daily as needed (Wheezing, SOB). Controller  Qty: 1 mL, Refills: 0      carvediloL (COREG) 12.5 MG tablet Take 1 tablet (12.5 mg total) by mouth 2 (two) times daily with meals.  Qty: 60 tablet, Refills: 11    Comments: .      doxycycline (VIBRAMYCIN) 100 MG Cap Take 1 capsule (100 mg total) by mouth 2 (two) times daily. for 10 days  Qty: 20 capsule, Refills: 0      ferrous sulfate 325 (65 FE) MG EC tablet Take 1 tablet (325 mg total) by mouth once daily.  Qty: 30 tablet, Refills: 0      JANUVIA 50 mg Tab Take 100 mg by mouth once daily.      albuterol (PROVENTIL) 2.5 mg /3 mL (0.083 %) nebulizer solution Inhale 2.5 mg into the lungs.      ALKALOL  NASAL WASH Soln 50 mLs by Nasal route once daily.      collagenase (SANTYL) ointment Apply topically once daily.  Qty: 100 g, Refills: 0      insulin detemir U-100 (LEVEMIR) 100 unit/mL injection Inject 7 Units into the skin every evening.  Qty: 2.1 mL, Refills: 11      polyethylene glycol (GLYCOLAX) 17 gram PwPk Take 17 g by mouth 3 (three) times daily as needed (nausea vomiting).  Qty: 30 each, Refills: 0            CONSULTS     Consults (From admission, onward)          Status Ordering Provider     Inpatient consult to Wound Care Physician  Once        Provider:  Keyonna Jean MD    Acknowledged DARLIN FRY     Inpatient consult to Registered Dietitian/Nutritionist  Once        Provider:  (Not yet assigned)    Acknowledged DARLIN FRY          FOLLOW UP     DISCHARGE INSTRUCTIONS     Explained in detail to the patient about the discharge plan, medications, and follow-up visits. Pt understands and agrees with the treatment plan.  Discharged Condition: stable  Diet as tolerated  Activities as tolerated  Discharge to: Swing Bed    TIME SPENT ON DISCHARGE   35 minutes    ANTHONY Vazquez  Internal Medicine  Department of Hospital Medicine [facility]    This document was created using electronic dictation services.  Please excuse any errors that may have been made.  Contact me if any questions regarding documentation to clarify verbiage.

## 2024-02-26 NOTE — PLAN OF CARE
Problem: Skin Injury Risk Increased  Goal: Skin Health and Integrity  Outcome: Ongoing, Progressing  Intervention: Optimize Skin Protection  Flowsheets (Taken 2/25/2024 2100)  Pressure Reduction Techniques:   frequent weight shift encouraged   weight shift assistance provided   heels elevated off bed   positioned off wounds   pressure points protected  Pressure Reduction Devices:   elbow protectors utilized   foam padding utilized   heel offloading device utilized   positioning supports utilized   specialty bed utilized  Skin Protection:   adhesive use limited   incontinence pads utilized   skin sealant/moisture barrier applied   tubing/devices free from skin contact  Head of Bed (HOB) Positioning: HOB at 30-45 degrees  Intervention: Promote and Optimize Oral Intake  Flowsheets (Taken 2/25/2024 2100)  Oral Nutrition Promotion:   calorie-dense foods provided   calorie-dense liquids provided     Problem: Adult Inpatient Plan of Care  Goal: Plan of Care Review  Outcome: Ongoing, Progressing  Flowsheets (Taken 2/25/2024 2100)  Plan of Care Reviewed With:   patient   sibling  Goal: Patient-Specific Goal (Individualized)  Outcome: Ongoing, Progressing  Flowsheets (Taken 2/25/2024 2100)  Anxieties, Fears or Concerns: Hope to sleep tonight  Individualized Care Needs:   Wound care/management as ordered   Turn Q2 hrs   Monitor for s/s of worsening infection   Monitor blood glucose levels   Catheter care to prevent CAUTI   Promote sleep with meds ordered.  Goal: Absence of Hospital-Acquired Illness or Injury  Outcome: Ongoing, Progressing  Intervention: Identify and Manage Fall Risk  Flowsheets (Taken 2/25/2024 2100)  Safety Promotion/Fall Prevention:   bed alarm set   assistive device/personal item within reach   Fall Risk reviewed with patient/family   Fall Risk signage in place   family to remain at bedside   high risk medications identified   medications reviewed   lighting adjusted   room near unit  station  Intervention: Prevent Skin Injury  Flowsheets (Taken 2/25/2024 2100)  Body Position:   log-rolled   weight shifting  Skin Protection:   adhesive use limited   incontinence pads utilized   skin sealant/moisture barrier applied   tubing/devices free from skin contact  Intervention: Prevent and Manage VTE (Venous Thromboembolism) Risk  Flowsheets (Taken 2/25/2024 2100)  Activity Management: Rolling - L1  VTE Prevention/Management:   bleeding risk assessed   bleeding precations maintained   fluids promoted   ROM (passive) performed  Range of Motion: ROM (range of motion) performed  Intervention: Prevent Infection  Flowsheets (Taken 2/25/2024 2100)  Infection Prevention:   environmental surveillance performed   equipment surfaces disinfected   hand hygiene promoted   personal protective equipment utilized   rest/sleep promoted  Goal: Optimal Comfort and Wellbeing  Outcome: Ongoing, Progressing  Intervention: Monitor Pain and Promote Comfort  Flowsheets (Taken 2/25/2024 2100)  Pain Management Interventions:   care clustered   pain management plan reviewed with patient/caregiver   medication offered but refused   pillow support provided   position adjusted   quiet environment facilitated  Intervention: Provide Person-Centered Care  Flowsheets (Taken 2/25/2024 2100)  Trust Relationship/Rapport:   care explained   emotional support provided   choices provided   empathic listening provided   questions answered   questions encouraged   reassurance provided   thoughts/feelings acknowledged     Problem: Diabetes Comorbidity  Goal: Blood Glucose Level Within Targeted Range  Outcome: Ongoing, Progressing  Intervention: Monitor and Manage Glycemia  Flowsheets (Taken 2/25/2024 2100)  Glycemic Management:   blood glucose monitored   supplemental insulin given     Problem: Impaired Wound Healing  Goal: Optimal Wound Healing  Outcome: Ongoing, Progressing  Intervention: Promote Wound Healing  Flowsheets (Taken 2/25/2024  2100)  Oral Nutrition Promotion:   calorie-dense foods provided   calorie-dense liquids provided  Sleep/Rest Enhancement:   regular sleep/rest pattern promoted   room darkened   noise level reduced   awakenings minimized  Activity Management: Rolling - L1  Pain Management Interventions:   care clustered   pain management plan reviewed with patient/caregiver   medication offered but refused   pillow support provided   position adjusted   quiet environment facilitated     Problem: Fall Injury Risk  Goal: Absence of Fall and Fall-Related Injury  Outcome: Ongoing, Progressing  Intervention: Identify and Manage Contributors  Flowsheets (Taken 2/25/2024 2100)  Medication Review/Management:   medications reviewed   high-risk medications identified  Intervention: Promote Injury-Free Environment  Flowsheets (Taken 2/25/2024 2100)  Safety Promotion/Fall Prevention:   bed alarm set   assistive device/personal item within reach   Fall Risk reviewed with patient/family   Fall Risk signage in place   family to remain at bedside   high risk medications identified   medications reviewed   lighting adjusted   room near unit station

## 2024-02-26 NOTE — PROGRESS NOTES
[facility] - Medical Surgical Unit  Wound Care    Patient Name:  Antonio Galvan II   MRN:  [id number]  Date: 2/26/2024  Diagnosis: Infected pressure ulcer, unspecified pressure ulcer stage    History:     Past Medical History:   Diagnosis Date    A-fib     Cardiac arrest     7/2022    Chronic skin ulcer     DM (diabetes mellitus)     Infected decubitus ulcer     Lymphedema of leg     Neurogenic bladder     Obesity, unspecified     Pressure ulcer of heel     Pressure ulcer of right foot, stage 3     Pressure ulcer of right ischium     Quadriplegia     Quadriplegic spinal paralysis        Social History     Socioeconomic History    Marital status: Single   Tobacco Use    Smoking status: Never    Smokeless tobacco: Never   Substance and Sexual Activity    Alcohol use: Never    Drug use: Never    Sexual activity: Not Currently     Social Determinants of Health     Financial Resource Strain: Low Risk  (2/24/2024)    Overall Financial Resource Strain (CARDIA)     Difficulty of Paying Living Expenses: Not hard at all   Food Insecurity: No Food Insecurity (2/24/2024)    Hunger Vital Sign     Worried About Running Out of Food in the Last Year: Never true     Ran Out of Food in the Last Year: Never true   Transportation Needs: No Transportation Needs (2/24/2024)    PRAPARE - Transportation     Lack of Transportation (Medical): No     Lack of Transportation (Non-Medical): No   Physical Activity: Inactive (2/24/2024)    Exercise Vital Sign     Days of Exercise per Week: 0 days     Minutes of Exercise per Session: 0 min   Stress: Stress Concern Present (2/24/2024)    [employer] [place] [employer] - Occupational Stress Questionnaire     Feeling of Stress : To some extent   Social Connections: Moderately Integrated (2/24/2024)    Social Connection and Isolation Panel [NHANES]     Frequency of Communication with Friends and Family: More than three times a week     Frequency of Social Gatherings with Friends and  Family: More than three times a week     Attends Orthodoxy Services: 1 to 4 times per year     Active Member of Clubs or Organizations: Yes     Attends Club or Organization Meetings: 1 to 4 times per year     Marital Status: Never    Housing Stability: Low Risk  (2/24/2024)    Housing Stability Vital Sign     Unable to Pay for Housing in the Last Year: No     Number of Places Lived in the Last Year: 1     Unstable Housing in the Last Year: No       Precautions:     Allergies as of 02/23/2024    (No Known Allergies)       WOC Assessment Details/Treatment     New wound care consult obtained.  Pt is a known patient at [facility].   Multiple stg 4 pressure injuries present to L trochanter, R ischial area and unstageable sacral ulceration present.   Healing full thickness ulcerations present to L anterior ankle, L posterior ankle and R heel present.   Pt recently completed skin substitute applications in the home setting to L trochanter and R ischial ulcerations.   Wound care center deferred care to these sites until completed.  Purulent drainage present in an area of undermining to R ischial area  (12 - 2 o'clock ).  Necrotic tissue present, site cultured on 2/20/24 during wound care appt.   Undermining present to L trochanter ulceration as well, however, no necrotic tissue is visualized today.   Sacral ulceration remains with thick dry dense necrotic tissue to central aspect and friable, non granulating tissue to wound edges.   Surgical debridement will be scheduled inpatient for 2/27/24 with Dr. Jean.  Will continue home wound care orders until that time.   ALAL mattress in use, heel lift boots, turn q 2 hrs, wedge/pillows for repositioning.   Discussed wound care plan with pt.  Verbalized understanding.          02/26/24 1135   WOCN Assessment   WOCN Total Time (mins) 120   Visit Date 02/26/24   Consult Type New   WOCN Speciality Wound   Wound pressure   Number of Wounds 6    Continence Type Fecal   Intervention assessed;chart review   Teaching on-going   Skin Interventions   Device Skin Pressure Protection absorbent pad utilized/changed;positioning supports utilized;pressure points protected   Pressure Reduction Devices specialty bed utilized;alternating pressure pump (ADD);positioning supports utilized;heel offloading device utilized   Pressure Reduction Techniques weight shift assistance provided;pressure points protected;positioned off wounds;heels elevated off bed   Skin Protection incontinence pads utilized;tubing/devices free from skin contact   Pressure Injury Prevention    Check Moisture Management Pad Done   Sacral Foam Dressing   (dressings in place)   Heel protection technique Heel boot   Check Medical Devices Done        Altered Skin Integrity 08/01/23 1534 Left posterior Ankle Full thickness tissue loss. Base is covered by slough and/or eschar in the wound bed   Date First Assessed/Time First Assessed: 08/01/23 1534   Altered Skin Integrity Present on Admission - Did Patient arrive to the hospital with altered skin?: yes  Side: Left  Orientation: posterior  Location: Ankle  Description of Altered Skin Integri...   Wound Image    Dressing Appearance Intact;Dried drainage   Drainage Amount Small   Drainage Characteristics/Odor Serosanguineous;No odor;Bleeding controlled   Appearance Pink;Red;Tan;Not granulating   Tissue loss description Partial thickness   Red (%), Wound Tissue Color 80 %   Yellow (%), Wound Tissue Color 20 %   Periwound Area Maroon;Purple;Scar tissue;Dry   Wound Edges Irregular   Wound Length (cm) 2.1 cm   Wound Width (cm) 0.8 cm   Wound Depth (cm) 0.1 cm   Wound Volume (cm^3) 0.168 cm^3   Wound Surface Area (cm^2) 1.68 cm^2   Care Cleansed with:;Wound cleanser   Dressing Applied;Non-adherent;Hydrofiber;Gauze;Rolled gauze  (adaptic/aquacel)        Altered Skin Integrity 08/01/23 1535 Left anterior Ankle Other (comment) Full thickness tissue loss. Base is  covered by slough and/or eschar in the wound bed   Date First Assessed/Time First Assessed: 08/01/23 1535   Altered Skin Integrity Present on Admission - Did Patient arrive to the hospital with altered skin?: yes  Side: Left  Orientation: anterior  Location: Ankle  Is this injury device related?: No  ...   Wound Image    Dressing Appearance Intact;Dried drainage   Drainage Amount Small   Drainage Characteristics/Odor Serosanguineous;No odor;Bleeding controlled   Appearance Red;Not granulating   Tissue loss description Full thickness   Red (%), Wound Tissue Color 100 %   Periwound Area Maroon;Purple;Scar tissue   Wound Edges Defined   Wound Length (cm) 1.5 cm   Wound Width (cm) 1 cm   Wound Depth (cm) 0.1 cm   Wound Volume (cm^3) 0.15 cm^3   Wound Surface Area (cm^2) 1.5 cm^2   Care Cleansed with:;Wound cleanser   Dressing Applied;Non-adherent;Hydrofiber;Gauze;Rolled gauze        Altered Skin Integrity 01/12/23 1530 Sacral spine Full thickness tissue loss with exposed bone, tendon, or muscle. Often includes undermining and tunneling. May extend into muscle and/or supporting structures.   Date First Assessed/Time First Assessed: 01/12/23 1530   Altered Skin Integrity Present on Admission - Did Patient arrive to the hospital with altered skin?: yes  Location: Sacral spine  Description of Altered Skin Integrity: Full thickness tissue los...   Wound Image    Description of Altered Skin Integrity Full thickness tissue loss. Base is covered by slough and/or eschar in the wound bed   Dressing Appearance Intact;Moist drainage   Drainage Amount Moderate   Drainage Characteristics/Odor Serous;Yellow   Appearance Yellow;Slough;Eschar;Dry;Red;Not granulating   Tissue loss description Full thickness   Red (%), Wound Tissue Color 15 %   Yellow (%), Wound Tissue Color 85 %   Periwound Area Denuded;Excoriated   Wound Edges Irregular   Wound Length (cm) 12.5 cm   Wound Width (cm) 15 cm   Wound Depth (cm) 1.5 cm   Wound Volume (cm^3)  281.25 cm^3   Wound Surface Area (cm^2) 187.5 cm^2   Care Cleansed with:;Wound cleanser   Dressing Applied;Sodium chloride impregnated;Gauze;Absorptive Pad;Other (comment)  (santyl, mesalt)   Packing other (see comment)  (vashe moistened mesalt)   Periwound Care Skin barrier film applied        Altered Skin Integrity 05/06/22 1140 Right Heel  Full thickness tissue loss. Subcutaneous fat may be visible but bone, tendon or muscle are not exposed   Date First Assessed/Time First Assessed: 05/06/22 1140   Altered Skin Integrity Present on Admission: yes  Side: Right  Location: Heel  Is this injury device related?: No  Primary Wound Type: (c)   Description of Altered Skin Integrity: Full thickness...   Wound Image    Dressing Appearance Intact;Dried drainage   Drainage Amount Moderate   Drainage Characteristics/Odor Sanguineous;No odor;Bleeding controlled   Appearance Red;Not granulating;Tan;Dry;Other (see comments)  (periwound scabs, scar tissue present)   Tissue loss description Full thickness   Periwound Area Dry;Scar tissue;Excoriated   Wound Edges Irregular   Wound Length (cm) 3 cm   Wound Width (cm) 1.8 cm   Wound Depth (cm) 0.1 cm   Wound Volume (cm^3) 0.54 cm^3   Wound Surface Area (cm^2) 5.4 cm^2   Care Cleansed with:;Wound cleanser   Dressing Applied;Non-adherent;Hydrofiber;Gauze;Rolled gauze        Incision/Site 08/16/23 2011 Right Hip posterior   Date First Assessed/Time First Assessed: 08/16/23 2011   Side: Right  Location: Hip  Orientation: posterior   Wound Image     Dressing Appearance Intact;Moist drainage;Saturated   Drainage Amount Large   Drainage Characteristics/Odor Serosanguineous;Purulent;Creamy;No odor;Bleeding controlled   Appearance Red;Granulating;Gray;Tan;Black;Necrotic   Black (%), Wound Tissue Color 15 %   Red (%), Wound Tissue Color 85 %   Periwound Area Ecchymotic   Wound Edges Irregular;Rolled/closed   Wound Length (cm) 11 cm   Wound Width (cm) 14 cm   Wound Depth (cm) 2.5 cm   Wound  Volume (cm^3) 385 cm^3   Wound Surface Area (cm^2) 154 cm^2   Undermining (depth (cm)/location) 4.7cm at 1 o'clock / undermining 12 to 2 o'clock   Care Cleansed with:;Wound cleanser   Dressing Applied;Collagen;Silver;Gauze;Absorptive Pad  (Vashe moistened mesalt packing lightly in area of undermining.medfix tape)   Packing other (see comment)  (vashe moistened mesalt)   Periwound Care Skin barrier film applied        Incision/Site 08/16/23 2011 Left Hip lateral   Date First Assessed/Time First Assessed: 08/16/23 2011   Side: Left  Location: Hip  Orientation: lateral   Wound Image    Dressing Appearance Intact;Moist drainage   Drainage Amount Moderate   Drainage Characteristics/Odor Serosanguineous;No odor;Bleeding controlled   Appearance Red;Granulating;Tan;Fibrin   Red (%), Wound Tissue Color 95 %   Yellow (%), Wound Tissue Color 5 %   Periwound Area Intact;Dry;Scar tissue   Wound Edges Defined   Wound Length (cm) 5.5 cm   Wound Width (cm) 3.5 cm   Wound Depth (cm) 1.3 cm   Wound Volume (cm^3) 25.025 cm^3   Wound Surface Area (cm^2) 19.25 cm^2   Undermining (depth (cm)/location) 2cm @ 9 o'clock/ undermining from 1 - 11 o'clock   Care Cleansed with:;Wound cleanser   Dressing Applied;Sodium chloride impregnated;Gauze;Absorptive Pad  (vashe moistened mesalt / medfix tape)   Packing packed with;other (see comment)  (vashe moistened mesalt)       Recommendations made to primary team . Orders placed.     02/26/2024

## 2024-02-26 NOTE — PROGRESS NOTES
Inpatient Nutrition Evaluation    Admit Date: 2/23/2024   Total duration of encounter: 3 days   Patient Age: 58 y.o.    Nutrition Recommendation/Prescription     Continue diabetic diet. NPO after Mn on 2/27/24 2. Add Arginaid 1 pk BID, for wound healing.  3. Continue ascorbic acid daily 3. Weekly weights 4. Monitor intake, wt, wound, labs, medications.     Nutrition Assessment     Chart Review    Reason Seen: continuous nutrition monitoring and length of stay    Malnutrition Screening Tool Results   Have you recently lost weight without trying?: Yes: 34 lbs or more  Have you been eating poorly because of a decreased appetite?: Yes   MST Score: 5   Diagnosis:  Infected wounds/sacrum  Klebsiella pneumoniae, Proteus mirabilis  DM,   Hypotension  Hx of Neurogenic bladder, Quadriplegia  Relevant Medical History:   A-fib       Cardiac arrest       7/2022    Chronic skin ulcer      DM (diabetes mellitus)      Infected decubitus ulcer      Lymphedema of leg      Neurogenic bladder      Obesity, unspecified      Pressure ulcer of heel      Pressure ulcer of right foot, stage 3      Pressure ulcer of right ischium      Quadriplegia      Quadriplegic spinal paralysis        Scheduled Medications:  acetaminophen, 650 mg, QHS  ascorbic acid (vitamin C), 500 mg, Daily  aspirin, 81 mg, Daily  budesonide, 0.5 mg, Q12H  ceFEPime (MAXIPIME) IVPB EXTENDED INFUSION, 2 g, Q8H  cetirizine, 10 mg, Daily  collagenase, , Daily  diphenhydrAMINE, 25 mg, QHS  ferrous sulfate, 1 tablet, Daily  insulin detemir U-100, 5 Units, Daily  pantoprazole, 40 mg, Daily  SITagliptin phosphate, 100 mg, Daily    Continuous Infusions:   PRN Medications: sodium chloride 0.9%, acetaminophen, albuterol, benzonatate, bisacodyL, calcium carbonate, cloNIDine, dextrose 50% in water (D50W), dextrose 50% in water (D50W), diphenhydrAMINE, glucagon (human recombinant), glucose, glucose, hydrALAZINE, HYDROcodone-acetaminophen, hydrOXYzine pamoate, insulin aspart U-100,  "LIDOcaine HCl 2%, loperamide, melatonin, metoprolol, ondansetron, simethicone, zolpidem    Recent Labs   Lab 02/23/24  1215 02/23/24  1445 02/23/24  1748 02/25/24  0526   NA  --  133*  --  134*   K  --  5.3*  --  4.9   CALCIUM  --  9.6  --  9.1   PHOS  --   --   --  3.4   MG  --   --   --  1.80   CHLORIDE  --  104  --  105   CO2  --  18*  --  19*   BUN  --  26.4*  --  27.4*   CREATININE  --  1.11  --  1.00   EGFRNORACEVR  --  >60  --  >60   GLUCOSE  --  327*  --  213*   BILITOT  --  0.3  --  0.2   ALKPHOS  --  105  --  106   ALT  --  16  --  15   AST  --  8  --  10   ALBUMIN  --  2.4*  --  2.0*   PREALB 14.3*  --   --   --    HGBA1C  --   --  8.5*  --    WBC  --  17.04*  --  10.65   HGB  --  10.3*  --  8.4*   HCT  --  33.3*  --  27.0*     Nutrition Orders:  Diet diabetic  Diet NPO Except for: Medication      Appetite/Oral Intake: good/50-75% of meals  Factors Affecting Nutritional Intake:  infective wounds sacral   Food/[place]/Cultural Preferences: none reported  Food Allergies: none reported  Last Bowel Movement: 02/23/24  Wound(s):      Comments    Pt reports intake is good. Consult for wounds. Provide PA recs. Will monitor. Family present. Pt eating outside food 50%. Will monitor. Pt is NPO after mn for surgery tomorrow.     Anthropometrics    Height: 5' 7" (170.2 cm), Height Method: Stated  Last Weight:  (so the bed dont do the weight) (02/26/24 0531), Weight Method: Bed Scale  BMI (Calculated): 32.4  BMI Classification: obese grade I (BMI 30-34.9)        Ideal Body Weight (IBW), Male: 148 lb     % Ideal Body Weight, Male (lb): 139.86 %                          Usual Weight Provided By: unable to obtain usual weight    Wt Readings from Last 5 Encounters:   02/23/24 93.9 kg (207 lb)   08/16/23 106.4 kg (234 lb 9.6 oz)   08/15/23 113.4 kg (250 lb)   08/14/23 108.9 kg (240 lb)   06/23/23 100.7 kg (222 lb 0.1 oz)     Weight Change(s) Since Admission:   Wt Readings from Last 1 Encounters:   02/23/24 1745 93.9 kg " (207 lb)   02/23/24 1402 99.8 kg (220 lb)   Admit Weight: 99.8 kg (220 lb) (02/23/24 1402), Weight Method: Stated    Patient Education     Not applicable.    Nutrition Goals & Monitoring     Dietitian will monitor: food and beverage intake, weight, weight change, electrolyte/renal panel, glucose/endocrine profile, and gastrointestinal profile    Nutrition Risk/Follow-Up: low (follow-up in 5-7 days)  Patients assigned 'low nutrition risk' status do not qualify for a full nutritional assessment but will be monitored and re-evaluated in a 5-7 day time period. Please consult if re-evaluation needed sooner.

## 2024-02-26 NOTE — H&P
[facility]  [facility] MEDICINE - H&P ADMISSION NOTE    Patient Name: Antonio Galvan II  MRN: [id number]  Patient Class: IP- Swing   Admission Date: (Not on file)   Admitting Physician:  Service   Attending Physician: ANTHONY Vazquez  Primary Care Provider: Jennifer Fernando NP  Face-to-Face encounter date: 02/26/2024      CHIEF COMPLAINT   No chief complaint on file.    HISTORY OF PRESENTING ILLNESS     This 58-year-old man with quadriplegic spinal paralysis and numerous decubitus ulcers who is followed by wound care is brought in today due to fever. He had wound cultures done 3 days ago that have grown Proteus mirabilis and Klebsiella pneumoniae. Sensitivities are not complete. The wounds were noted to have significant odor and heavy green drainage. In addition the patient had some nausea and vomiting and home health noted that his blood pressure was dipping. He was advised to come to the emergency room. Wound cultures were drawn from the right hip wound. His wife reports that when the wound is pressed above pus comes out. Both Klebsiella and Proteus maybe treated with fluoroquinolones which are both IV and oral. The patient's wife preferred if the patient came home so that she could attend to his wounds however the patient expressed a desire to be admitted.     We will admit for IV abx and wound care and a consult to Dr. Jean when he is on campus for possible debridment.     No reported complaints today.  Levaquin was discontinued and cefepime initiated based off of culture sensitivity report.  Patient will be discharged to our swing unit today.    PAST MEDICAL HISTORY     Past Medical History:   Diagnosis Date    A-fib     Cardiac arrest     7/2022    Chronic skin ulcer     DM (diabetes mellitus)     Infected decubitus ulcer     Lymphedema of leg     Neurogenic bladder     Obesity, unspecified     Pressure ulcer of heel     Pressure ulcer of right foot, stage 3      Pressure ulcer of right ischium     Quadriplegia     Quadriplegic spinal paralysis      PAST SURGICAL HISTORY     Past Surgical History:   Procedure Laterality Date    APPLICATION OF WOUND VACUUM-ASSISTED CLOSURE DEVICE Right 5/12/2023    Procedure: APPLICATION, WOUND VAC;  Surgeon: Keyonna Jean MD;  Location: [facility] OR;  Service: General;  Laterality: Right;    DEBRIDEMENT OF BUTTOCKS Right 5/12/2023    Procedure: DEBRIDEMENT, BUTTOCK;  Surgeon: Keyonna Jean MD;  Location: [facility] OR;  Service: General;  Laterality: Right;    INCISION AND DRAINAGE HIP Bilateral 8/16/2023    Procedure: INCISION AND DRAINAGE, HIP;  Surgeon: Ryan Tirado MD;  Location: [facility] OR;  Service: General;  Laterality: Bilateral;  1. Excisional debridement skin to bone, skin to bone with biopsy and debridement of hard bone at the Left hip necrotic wound 11 cm long 5-1/2 cm wide 4-1/2 cm deep upon completion of debridement   2. Excisional debridement skin to muscle right hip with debridement     FAMILY HISTORY   Reviewed and noncontributory to this case    SOCIAL HISTORY     Social History     Socioeconomic History    Marital status: Single   Tobacco Use    Smoking status: Never    Smokeless tobacco: Never   Substance and Sexual Activity    Alcohol use: Never    Drug use: Never    Sexual activity: Not Currently     Social Determinants of Health     Financial Resource Strain: Low Risk  (2/24/2024)    Overall Financial Resource Strain (CARDIA)     Difficulty of Paying Living Expenses: Not hard at all   Food Insecurity: No Food Insecurity (2/24/2024)    Hunger Vital Sign     Worried About Running Out of Food in the Last Year: Never true     Ran Out of Food in the Last Year: Never true   Transportation Needs: No Transportation Needs (2/24/2024)    PRAPARE - Transportation     Lack of Transportation (Medical): No     Lack of Transportation (Non-Medical): No   Physical Activity: Inactive (2/24/2024)    Exercise Vital Sign     Days of  Exercise per Week: 0 days     Minutes of Exercise per Session: 0 min   Stress: Stress Concern Present (2/24/2024)    [employer] - Occupational Stress Questionnaire     Feeling of Stress : To some extent   Social Connections: Moderately Integrated (2/24/2024)    Social Connection and Isolation Panel [NHANES]     Frequency of Communication with Friends and Family: More than three times a week     Frequency of Social Gatherings with Friends and Family: More than three times a week     Attends Congregation Services: 1 to 4 times per year     Active Member of Clubs or Organizations: Yes     Attends Club or Organization Meetings: 1 to 4 times per year     Marital Status: Never    Housing Stability: Low Risk  (2/24/2024)    Housing Stability Vital Sign     Unable to Pay for Housing in the Last Year: No     Number of Places Lived in the Last Year: 1     Unstable Housing in the Last Year: No     HOME MEDICATIONS     Prior to Admission medications    Medication Sig Start Date End Date Taking? Authorizing Provider   albuterol (PROVENTIL) 2.5 mg /3 mL (0.083 %) nebulizer solution Inhale 2.5 mg into the lungs. 12/8/21 9/28/23  Provider, Historical   ALKALOL NASAL WASH Soln 50 mLs by Nasal route once daily. 8/15/22   Provider, Historical   ascorbic acid, vitamin C, (VITAMIN C) 500 MG tablet Take 1 tablet (500 mg total) by mouth once daily. 9/28/23   Jeanette Mann MD   aspirin (ECOTRIN) 81 MG EC tablet Take 1 tablet (81 mg total) by mouth once daily. 9/28/23 9/27/24  Jeanette Mann MD   bisacodyL (DULCOLAX) 10 mg Supp Place 1 suppository (10 mg total) rectally daily as needed (Until bowel movement if patient has no bowel movement for 2 days). 9/27/23   Jeanette Mann MD   budesonide (PULMICORT) 0.5 mg/2 mL nebulizer solution Take 2 mLs (0.5 mg total) by nebulization daily as needed (Wheezing, SOB). Controller 9/27/23 9/26/24  Jeanette Mann MD   carvediloL (COREG) 12.5 MG tablet Take  1 tablet (12.5 mg total) by mouth 2 (two) times daily with meals. 9/27/23 9/26/24  Jeanette Mann MD   collagenase (SANTYL) ointment Apply topically once daily.  Patient not taking: Reported on 9/28/2023 5/25/23   Syl Avila MD   doxycycline (VIBRAMYCIN) 100 MG Cap Take 1 capsule (100 mg total) by mouth 2 (two) times daily. for 10 days 2/20/24 3/1/24  Ronald Barcenas MD   ferrous sulfate 325 (65 FE) MG EC tablet Take 1 tablet (325 mg total) by mouth once daily. 9/27/23   Jeanette Mann MD   insulin detemir U-100 (LEVEMIR) 100 unit/mL injection Inject 7 Units into the skin every evening.  Patient not taking: Reported on 9/28/2023 9/27/23 9/26/24  Jeanette Mann MD   JANUVIA 50 mg Tab Take 100 mg by mouth once daily. 5/4/22   Provider, Historical   polyethylene glycol (GLYCOLAX) 17 gram PwPk Take 17 g by mouth 3 (three) times daily as needed (nausea vomiting).  Patient not taking: Reported on 9/28/2023 9/27/23   Jeanette Mann MD       ALLERGIES   Patient has no known allergies.    REVIEW OF SYSTEMS   Except as documented above, all other systems reviewed and negative    PHYSICAL EXAM     There were no vitals filed for this visit.     GENERAL: In no acute distress, afebrile  HEENT:  Normocephalic, atraumatic, moist mucous membranes  CHEST: Clear to auscultation bilaterally  HEART: S1, S2, no appreciable murmur  ABDOMEN: Soft, nontender, BS +  MSK: Warm, no lower extremity edema, no clubbing or cyanosis  NEUROLOGIC: Alert and oriented x4, quadriplegic   INTEGUMENTARY:  See media for wound does  PSYCHIATRY:  Appropriate mood and affect    ASSESSMENT AND PLAN     Infected wounds/sacrum  Klebsiella pneumoniae, Proteus mirabilis  Discontinue Levaquin based off of sensitivity report ([id number])  Start cefepime 2g Q8  Consult Dr. Jean for possible debridement     DM  Continue home Sitagliptin  Add detemir 5 units q.a.m.  Moderate dose SSI     Hypotension - resolved      Hx of:  Neurogenic bladder, Quadriplegia    DVT prophylaxis:  SCDs, history of bleeding in the recent past    Admit this patient to observation under my care.  __________________________________________________________________  LABS/MICRO/MEDS/DIAGNOSTICS     LABS  Recent Labs     02/25/24  0526   *   K 4.9   CHLORIDE 105   CO2 19*   BUN 27.4*   CREATININE 1.00   GLUCOSE 213*   CALCIUM 9.1   ALKPHOS 106   AST 10   ALT 15   ALBUMIN 2.0*     Recent Labs     02/25/24  0526   WBC 10.65   RBC 3.22*   HCT 27.0*   MCV 83.9        MICROBIOLOGY  Microbiology Results (last 7 days)       ** No results found for the last 168 hours. **          MEDICATIONS     INFUSIONS    DIAGNOSTIC TESTS  No orders to display      Patient information was obtained from patient, patient's family, past medical records and ER records.   All diagnosis and differential diagnosis have been reviewed; assessment and plan has been documented. I have personally reviewed the labs and test results that are presently available; I have reviewed the patients medication list. I have reviewed the consulting providers response and recommendations. I have reviewed or attempted to review medical records based upon their availability.  All of the patient's questions have been addressed and answered. Patient's is agreeable to the above stated plan. I will continue to monitor closely and make adjustments to medical management as needed.  This note was created using [employer] voice recognition software that occasionally misinterpreted phrases or words.  Please contact me if any questions may rise regarding documentation to clarify verbiage.      ANTHONY Vazquez   Internal Medicine  Department of Hospital Medicine [facility]   Statement Selected

## 2024-02-27 ENCOUNTER — HOSPITAL ENCOUNTER (OUTPATIENT)
Facility: HOSPITAL | Age: 59
Discharge: HOME | End: 2024-02-27
Attending: SURGERY | Admitting: SURGERY
Payer: MEDICARE

## 2024-02-27 ENCOUNTER — ANESTHESIA EVENT (OUTPATIENT)
Dept: SURGERY | Facility: HOSPITAL | Age: 59
End: 2024-02-27
Payer: MEDICARE

## 2024-02-27 ENCOUNTER — ANESTHESIA (OUTPATIENT)
Dept: SURGERY | Facility: HOSPITAL | Age: 59
End: 2024-02-27
Payer: MEDICARE

## 2024-02-27 VITALS
HEART RATE: 101 BPM | WEIGHT: 207 LBS | DIASTOLIC BLOOD PRESSURE: 55 MMHG | TEMPERATURE: 98 F | HEIGHT: 67 IN | BODY MASS INDEX: 32.49 KG/M2 | SYSTOLIC BLOOD PRESSURE: 94 MMHG | OXYGEN SATURATION: 98 % | RESPIRATION RATE: 25 BRPM

## 2024-02-27 DIAGNOSIS — L89.314 PRESSURE INJURY OF RIGHT ISCHIUM, STAGE 4: Chronic | ICD-10-CM

## 2024-02-27 DIAGNOSIS — L89.90 INFECTED PRESSURE ULCER, UNSPECIFIED PRESSURE ULCER STAGE: Primary | ICD-10-CM

## 2024-02-27 DIAGNOSIS — L08.9 INFECTED PRESSURE ULCER, UNSPECIFIED PRESSURE ULCER STAGE: Primary | ICD-10-CM

## 2024-02-27 LAB
BACTERIA WND CULT: ABNORMAL
POCT GLUCOSE: 254 MG/DL (ref 70–110)
POCT GLUCOSE: 271 MG/DL (ref 70–110)
POCT GLUCOSE: 271 MG/DL (ref 70–110)
POCT GLUCOSE: 276 MG/DL (ref 70–110)
POCT GLUCOSE: 279 MG/DL (ref 70–110)
POCT GLUCOSE: 321 MG/DL (ref 70–110)

## 2024-02-27 PROCEDURE — 37000008 HC ANESTHESIA 1ST 15 MINUTES: Performed by: SURGERY

## 2024-02-27 PROCEDURE — 87077 CULTURE AEROBIC IDENTIFY: CPT | Performed by: SURGERY

## 2024-02-27 PROCEDURE — 11000004 HC SNF PRIVATE

## 2024-02-27 PROCEDURE — 36000706: Performed by: SURGERY

## 2024-02-27 PROCEDURE — 63600175 PHARM REV CODE 636 W HCPCS: Performed by: PHYSICIAN ASSISTANT

## 2024-02-27 PROCEDURE — 63600175 PHARM REV CODE 636 W HCPCS: Performed by: NURSE ANESTHETIST, CERTIFIED REGISTERED

## 2024-02-27 PROCEDURE — 25000003 PHARM REV CODE 250: Performed by: NURSE ANESTHETIST, CERTIFIED REGISTERED

## 2024-02-27 PROCEDURE — 25000003 PHARM REV CODE 250: Performed by: PHYSICIAN ASSISTANT

## 2024-02-27 PROCEDURE — D9220A PRA ANESTHESIA: Mod: ,,, | Performed by: NURSE ANESTHETIST, CERTIFIED REGISTERED

## 2024-02-27 PROCEDURE — 36000707: Performed by: SURGERY

## 2024-02-27 PROCEDURE — 25000003 PHARM REV CODE 250: Performed by: INTERNAL MEDICINE

## 2024-02-27 PROCEDURE — 37000009 HC ANESTHESIA EA ADD 15 MINS: Performed by: SURGERY

## 2024-02-27 PROCEDURE — 94760 N-INVAS EAR/PLS OXIMETRY 1: CPT

## 2024-02-27 PROCEDURE — 99900035 HC TECH TIME PER 15 MIN (STAT)

## 2024-02-27 PROCEDURE — 63600175 PHARM REV CODE 636 W HCPCS: Performed by: INTERNAL MEDICINE

## 2024-02-27 PROCEDURE — 71000033 HC RECOVERY, INTIAL HOUR: Performed by: SURGERY

## 2024-02-27 RX ORDER — DIPHENHYDRAMINE HYDROCHLORIDE 50 MG/ML
12.5 INJECTION INTRAMUSCULAR; INTRAVENOUS ONCE AS NEEDED
Status: CANCELLED | OUTPATIENT
Start: 2024-02-27 | End: 2035-07-26

## 2024-02-27 RX ORDER — MEPERIDINE HYDROCHLORIDE 25 MG/ML
12.5 INJECTION INTRAMUSCULAR; INTRAVENOUS; SUBCUTANEOUS ONCE AS NEEDED
Status: DISCONTINUED | OUTPATIENT
Start: 2024-02-27 | End: 2024-02-27 | Stop reason: HOSPADM

## 2024-02-27 RX ORDER — ONDANSETRON HYDROCHLORIDE 2 MG/ML
4 INJECTION, SOLUTION INTRAVENOUS DAILY PRN
Status: DISCONTINUED | OUTPATIENT
Start: 2024-02-27 | End: 2024-02-27 | Stop reason: HOSPADM

## 2024-02-27 RX ORDER — PROPOFOL 10 MG/ML
VIAL (ML) INTRAVENOUS
Status: DISCONTINUED | OUTPATIENT
Start: 2024-02-27 | End: 2024-02-27

## 2024-02-27 RX ORDER — SODIUM CHLORIDE 9 MG/ML
INJECTION, SOLUTION INTRAVENOUS CONTINUOUS
Status: DISCONTINUED | OUTPATIENT
Start: 2024-02-27 | End: 2024-02-27 | Stop reason: HOSPADM

## 2024-02-27 RX ORDER — LIDOCAINE HYDROCHLORIDE 20 MG/ML
INJECTION INTRAVENOUS
Status: DISCONTINUED | OUTPATIENT
Start: 2024-02-27 | End: 2024-02-27

## 2024-02-27 RX ORDER — MEPERIDINE HYDROCHLORIDE 25 MG/ML
12.5 INJECTION INTRAMUSCULAR; INTRAVENOUS; SUBCUTANEOUS ONCE AS NEEDED
Status: CANCELLED | OUTPATIENT
Start: 2024-02-27 | End: 2024-02-28

## 2024-02-27 RX ORDER — HYDROCODONE BITARTRATE AND ACETAMINOPHEN 5; 325 MG/1; MG/1
1 TABLET ORAL
Status: DISCONTINUED | OUTPATIENT
Start: 2024-02-27 | End: 2024-02-27 | Stop reason: HOSPADM

## 2024-02-27 RX ORDER — SODIUM CHLORIDE 9 MG/ML
INJECTION, SOLUTION INTRAVENOUS CONTINUOUS
Status: CANCELLED | OUTPATIENT
Start: 2024-02-27

## 2024-02-27 RX ORDER — ONDANSETRON HYDROCHLORIDE 2 MG/ML
4 INJECTION, SOLUTION INTRAVENOUS DAILY PRN
Status: CANCELLED | OUTPATIENT
Start: 2024-02-27

## 2024-02-27 RX ORDER — HYDROCODONE BITARTRATE AND ACETAMINOPHEN 5; 325 MG/1; MG/1
1 TABLET ORAL
Status: CANCELLED | OUTPATIENT
Start: 2024-02-27

## 2024-02-27 RX ORDER — BUDESONIDE 0.5 MG/2ML
0.5 INHALANT ORAL EVERY 12 HOURS PRN
Status: DISCONTINUED | OUTPATIENT
Start: 2024-02-27 | End: 2024-04-03

## 2024-02-27 RX ORDER — FENTANYL CITRATE 50 UG/ML
25 INJECTION, SOLUTION INTRAMUSCULAR; INTRAVENOUS EVERY 5 MIN PRN
Status: CANCELLED | OUTPATIENT
Start: 2024-02-27

## 2024-02-27 RX ORDER — FENTANYL CITRATE 50 UG/ML
25 INJECTION, SOLUTION INTRAMUSCULAR; INTRAVENOUS EVERY 5 MIN PRN
Status: DISCONTINUED | OUTPATIENT
Start: 2024-02-27 | End: 2024-02-27 | Stop reason: HOSPADM

## 2024-02-27 RX ORDER — DIPHENHYDRAMINE HYDROCHLORIDE 50 MG/ML
12.5 INJECTION INTRAMUSCULAR; INTRAVENOUS ONCE AS NEEDED
Status: DISCONTINUED | OUTPATIENT
Start: 2024-02-27 | End: 2024-02-27 | Stop reason: HOSPADM

## 2024-02-27 RX ADMIN — CEFEPIME 2 G: 2 INJECTION, POWDER, FOR SOLUTION INTRAVENOUS at 02:02

## 2024-02-27 RX ADMIN — PANTOPRAZOLE SODIUM 40 MG: 40 TABLET, DELAYED RELEASE ORAL at 09:02

## 2024-02-27 RX ADMIN — FERROUS SULFATE TAB 325 MG (65 MG ELEMENTAL FE) 1 EACH: 325 (65 FE) TAB at 09:02

## 2024-02-27 RX ADMIN — SITAGLIPTIN 100 MG: 50 TABLET, FILM COATED ORAL at 09:02

## 2024-02-27 RX ADMIN — ASPIRIN 81 MG: 81 TABLET, COATED ORAL at 09:02

## 2024-02-27 RX ADMIN — SODIUM CHLORIDE: 9 INJECTION, SOLUTION INTRAVENOUS at 05:02

## 2024-02-27 RX ADMIN — HYDROCODONE BITARTRATE AND ACETAMINOPHEN 1 TABLET: 5; 325 TABLET ORAL at 12:02

## 2024-02-27 RX ADMIN — PROPOFOL 20 MG: 10 INJECTION, EMULSION INTRAVENOUS at 02:02

## 2024-02-27 RX ADMIN — CETIRIZINE HYDROCHLORIDE 10 MG: 10 TABLET, FILM COATED ORAL at 09:02

## 2024-02-27 RX ADMIN — COLLAGENASE SANTYL: 250 OINTMENT TOPICAL at 09:02

## 2024-02-27 RX ADMIN — SODIUM CHLORIDE: 9 INJECTION, SOLUTION INTRAVENOUS at 11:02

## 2024-02-27 RX ADMIN — INSULIN ASPART 3 UNITS: 100 INJECTION, SOLUTION INTRAVENOUS; SUBCUTANEOUS at 09:02

## 2024-02-27 RX ADMIN — INSULIN DETEMIR 5 UNITS: 100 INJECTION, SOLUTION SUBCUTANEOUS at 09:02

## 2024-02-27 RX ADMIN — INSULIN ASPART 6 UNITS: 100 INJECTION, SOLUTION INTRAVENOUS; SUBCUTANEOUS at 05:02

## 2024-02-27 RX ADMIN — HYDROCODONE BITARTRATE AND ACETAMINOPHEN 1 TABLET: 5; 325 TABLET ORAL at 09:02

## 2024-02-27 RX ADMIN — PROPOFOL 40 MG: 10 INJECTION, EMULSION INTRAVENOUS at 02:02

## 2024-02-27 RX ADMIN — SODIUM CHLORIDE: 9 INJECTION, SOLUTION INTRAVENOUS at 02:02

## 2024-02-27 RX ADMIN — INSULIN ASPART 3 UNITS: 100 INJECTION, SOLUTION INTRAVENOUS; SUBCUTANEOUS at 05:02

## 2024-02-27 RX ADMIN — CEFEPIME 2 G: 2 INJECTION, POWDER, FOR SOLUTION INTRAVENOUS at 09:02

## 2024-02-27 RX ADMIN — SODIUM CHLORIDE: 9 INJECTION, SOLUTION INTRAVENOUS at 09:02

## 2024-02-27 RX ADMIN — SODIUM CHLORIDE: 9 INJECTION, SOLUTION INTRAVENOUS at 01:02

## 2024-02-27 RX ADMIN — MEROPENEM 1 G: 1 INJECTION, POWDER, FOR SOLUTION INTRAVENOUS at 05:02

## 2024-02-27 RX ADMIN — TOBRAMYCIN 560 MG: 40 INJECTION INTRAMUSCULAR; INTRAVENOUS at 11:02

## 2024-02-27 RX ADMIN — LIDOCAINE HYDROCHLORIDE 50 MG: 20 INJECTION, SOLUTION INTRAVENOUS at 02:02

## 2024-02-27 RX ADMIN — HYDROCODONE BITARTRATE AND ACETAMINOPHEN 1 TABLET: 5; 325 TABLET ORAL at 07:02

## 2024-02-27 RX ADMIN — Medication 6 MG: at 09:02

## 2024-02-27 RX ADMIN — OXYCODONE HYDROCHLORIDE AND ACETAMINOPHEN 500 MG: 500 TABLET ORAL at 09:02

## 2024-02-27 NOTE — PLAN OF CARE
Vital signs stable. Pain and nausea well controlled. Discharge criteria/Allison met. Ok for transfer to Phase II per  VIN Mccann CRNA.

## 2024-02-27 NOTE — PROGRESS NOTES
[facility]  [facility] MEDICINE - H&P ADMISSION NOTE    Patient Name: Antonio Galvan II  MRN: [id number]  Patient Class: IP- Swing   Admission Date: 2/26/2024   Admitting Physician:  Service   Attending Physician: ANTHONY Vazquez  Primary Care Provider: Jennifer Fernando NP  Face-to-Face encounter date: 02/27/2024      CHIEF COMPLAINT   No chief complaint on file.    HISTORY OF PRESENTING ILLNESS     This 58-year-old man with quadriplegic spinal paralysis and numerous decubitus ulcers who is followed by wound care is brought in today due to fever. He had wound cultures done 3 days ago that have grown Proteus mirabilis and Klebsiella pneumoniae. Sensitivities are not complete. The wounds were noted to have significant odor and heavy green drainage. In addition the patient had some nausea and vomiting and home health noted that his blood pressure was dipping. He was advised to come to the emergency room. Wound cultures were drawn from the right hip wound. His wife reports that when the wound is pressed above pus comes out. Both Klebsiella and Proteus maybe treated with fluoroquinolones which are both IV and oral. The patient's wife preferred if the patient came home so that she could attend to his wounds however the patient expressed a desire to be admitted.     We will admit for IV abx and wound care and a consult to Dr. Jean when he is on campus for possible debridment.     No reported complaints today.  Levaquin was discontinued and cefepime initiated based off of culture sensitivity report.  Patient will be discharged to our swing unit today.    Today:  Blood cultures remain negative, but abdominal wound culture revealed rare Pseudomonas aeruginosa only sensitive to tobramycin.  We will tobramycin today.  Glucose has been quite elevated to 300s and 400s.  Working with pharmacy to determine if cefepime can be given with saline rather than D5W.  Going to the OR for debridement  today.    PAST MEDICAL HISTORY     Past Medical History:   Diagnosis Date    A-fib     Cardiac arrest     7/2022    Chronic skin ulcer     DM (diabetes mellitus)     Infected decubitus ulcer     Lymphedema of leg     Neurogenic bladder     Obesity, unspecified     Pressure ulcer of heel     Pressure ulcer of right foot, stage 3     Pressure ulcer of right ischium     Quadriplegia     Quadriplegic spinal paralysis      PAST SURGICAL HISTORY     Past Surgical History:   Procedure Laterality Date    APPLICATION OF WOUND VACUUM-ASSISTED CLOSURE DEVICE Right 5/12/2023    Procedure: APPLICATION, WOUND VAC;  Surgeon: Keyonna Jean MD;  Location: [facility] OR;  Service: General;  Laterality: Right;    DEBRIDEMENT OF BUTTOCKS Right 5/12/2023    Procedure: DEBRIDEMENT, BUTTOCK;  Surgeon: Keyonna Jean MD;  Location: [facility] OR;  Service: General;  Laterality: Right;    INCISION AND DRAINAGE HIP Bilateral 8/16/2023    Procedure: INCISION AND DRAINAGE, HIP;  Surgeon: Ryan Tirado MD;  Location: [facility] OR;  Service: General;  Laterality: Bilateral;  1. Excisional debridement skin to bone, skin to bone with biopsy and debridement of hard bone at the Left hip necrotic wound 11 cm long 5-1/2 cm wide 4-1/2 cm deep upon completion of debridement   2. Excisional debridement skin to muscle right hip with debridement     FAMILY HISTORY   Reviewed and noncontributory to this case    SOCIAL HISTORY     Social History     Socioeconomic History    Marital status: Single   Tobacco Use    Smoking status: Never    Smokeless tobacco: Never   Substance and Sexual Activity    Alcohol use: Never    Drug use: Never    Sexual activity: Not Currently     Social Determinants of Health     Financial Resource Strain: Low Risk  (2/24/2024)    Overall Financial Resource Strain (CARDIA)     Difficulty of Paying Living Expenses: Not hard at all   Food Insecurity: No Food Insecurity (2/24/2024)    Hunger Vital Sign     Worried About Running Out of  Food in the Last Year: Never true     Ran Out of Food in the Last Year: Never true   Transportation Needs: No Transportation Needs (2/24/2024)    PRAPARE - Transportation     Lack of Transportation (Medical): No     Lack of Transportation (Non-Medical): No   Physical Activity: Inactive (2/24/2024)    Exercise Vital Sign     Days of Exercise per Week: 0 days     Minutes of Exercise per Session: 0 min   Stress: Stress Concern Present (2/24/2024)    [employer] [employer] - Occupational Stress Questionnaire     Feeling of Stress : To some extent   Social Connections: Moderately Integrated (2/24/2024)    Social Connection and Isolation Panel [NHANES]     Frequency of Communication with Friends and Family: More than three times a week     Frequency of Social Gatherings with Friends and Family: More than three times a week     Attends Baptism Services: 1 to 4 times per year     Active Member of Clubs or Organizations: Yes     Attends Club or Organization Meetings: 1 to 4 times per year     Marital Status: Never    Housing Stability: Low Risk  (2/24/2024)    Housing Stability Vital Sign     Unable to Pay for Housing in the Last Year: No     Number of Places Lived in the Last Year: 1     Unstable Housing in the Last Year: No     HOME MEDICATIONS     Prior to Admission medications    Medication Sig Start Date End Date Taking? Authorizing Provider   albuterol (PROVENTIL) 2.5 mg /3 mL (0.083 %) nebulizer solution Inhale 2.5 mg into the lungs. 12/8/21 9/28/23  Provider, Historical   ALKALOL NASAL WASH Soln 50 mLs by Nasal route once daily. 8/15/22   Provider, Historical   ascorbic acid, vitamin C, (VITAMIN C) 500 MG tablet Take 1 tablet (500 mg total) by mouth once daily. 9/28/23   Jeanette Mann MD   aspirin (ECOTRIN) 81 MG EC tablet Take 1 tablet (81 mg total) by mouth once daily. 9/28/23 9/27/24  Jeanette Mann MD   bisacodyL (DULCOLAX) 10 mg Supp Place 1 suppository (10 mg total) rectally  daily as needed (Until bowel movement if patient has no bowel movement for 2 days). 9/27/23   Jeanette Mann MD   budesonide (PULMICORT) 0.5 mg/2 mL nebulizer solution Take 2 mLs (0.5 mg total) by nebulization daily as needed (Wheezing, SOB). Controller 9/27/23 9/26/24  Jeanette Mann MD   carvediloL (COREG) 12.5 MG tablet Take 1 tablet (12.5 mg total) by mouth 2 (two) times daily with meals. 9/27/23 9/26/24  Jeanette Mann MD   collagenase (SANTYL) ointment Apply topically once daily.  Patient not taking: Reported on 9/28/2023 5/25/23   Syl Avila MD   doxycycline (VIBRAMYCIN) 100 MG Cap Take 1 capsule (100 mg total) by mouth 2 (two) times daily. for 10 days 2/20/24 3/1/24  Ronald Barcenas MD   ferrous sulfate 325 (65 FE) MG EC tablet Take 1 tablet (325 mg total) by mouth once daily. 9/27/23   Jeanette Mann MD   insulin detemir U-100 (LEVEMIR) 100 unit/mL injection Inject 7 Units into the skin every evening.  Patient not taking: Reported on 9/28/2023 9/27/23 9/26/24  Jeanette Mann MD   JANUVIA 50 mg Tab Take 100 mg by mouth once daily. 5/4/22   Provider, Historical   polyethylene glycol (GLYCOLAX) 17 gram PwPk Take 17 g by mouth 3 (three) times daily as needed (nausea vomiting).  Patient not taking: Reported on 9/28/2023 9/27/23   Jeanette Mann MD     ALLERGIES   Patient has no known allergies.    REVIEW OF SYSTEMS   Except as documented above, all other systems reviewed and negative    PHYSICAL EXAM     Vitals:    02/27/24 0339   BP: 130/84   Pulse: 88   Resp: 18   Temp: 99.3 °F (37.4 °C)        General:  In no acute distress, resting comfortably  Head and neck:  Atraumatic, normocephalic, moist mucous membranes, supple neck  Chest:  Clear to auscultation bilaterally  Heart:  S1, S2, no appreciable murmur  Abdomen:  Soft, nontender, BS +  MSK:  Warm, no lower extremity edema, no clubbing or cyanosis  Neuro:  Alert and oriented x4, moving all extremities with good  strength  Integumentary:  No obvious skin rash  Psychiatry:  Appropriate mood and affect    ASSESSMENT AND PLAN     Multiple infected wounds to sacrum, abdomen, feet  Klebsiella pneumoniae, Proteus mirabilis, Pseudomonas aeruginosa  Discontinue Levaquin based off of sensitivity report ([id number])  Continue cefepime 2g Q8 for 14 days, working with pharmacy to see if it can be given with saline rather than D5W due to hyperglycemia  Start tobramycin today for for 14 days abdominal wound culture results  Blood cultures remain negative at this time  Planning to be taken to the OR today with Dr. Jean for debridement     Hyperglycemia in setting of DM  A1c 02/23/2024 8.5%  Continue home Sitagliptin  Increase detemir to 10u q.a.m.  Moderate dose SSI     Hypotension - resolved      Hx of: Neurogenic bladder, Quadriplegia     DVT prophylaxis:  SCDs, history of bleeding in the recent past  __________________________________________________________________  LABS/MICRO/MEDS/DIAGNOSTICS     LABS  Recent Labs     02/25/24  0526   *   K 4.9   CHLORIDE 105   CO2 19*   BUN 27.4*   CREATININE 1.00   GLUCOSE 213*   CALCIUM 9.1   ALKPHOS 106   AST 10   ALT 15   ALBUMIN 2.0*     Recent Labs     02/25/24  0526   WBC 10.65   RBC 3.22*   HCT 27.0*   MCV 83.9        MICROBIOLOGY  Microbiology Results (last 7 days)       ** No results found for the last 168 hours. **          MEDICATIONS   acetaminophen  650 mg Oral QHS    ascorbic acid (vitamin C)  500 mg Oral Daily    aspirin  81 mg Oral Daily    budesonide  0.5 mg Nebulization Q12H    ceFEPime (MAXIPIME) IVPB EXTENDED INFUSION  2 g Intravenous Q8H    cetirizine  10 mg Oral Daily    collagenase   Topical (Top) Daily    diphenhydrAMINE  25 mg Oral QHS    ferrous sulfate  1 tablet Oral Daily    insulin detemir U-100  5 Units Subcutaneous Daily    pantoprazole  40 mg Oral Daily    SITagliptin phosphate  100 mg Oral Daily      INFUSIONS    DIAGNOSTIC TESTS  No orders  to display      Patient information was obtained from patient, patient's family, past medical records and ER records.   All diagnosis and differential diagnosis have been reviewed; assessment and plan has been documented. I have personally reviewed the labs and test results that are presently available; I have reviewed the patients medication list. I have reviewed the consulting providers response and recommendations. I have reviewed or attempted to review medical records based upon their availability.  All of the patient's questions have been addressed and answered. Patient's is agreeable to the above stated plan. I will continue to monitor closely and make adjustments to medical management as needed.  This note was created using [employer] voice recognition software that occasionally misinterpreted phrases or words.  Please contact me if any questions may rise regarding documentation to clarify verbiage.      ANTHONY Vazquez   Internal Medicine  Department of Hospital Medicine  [facility]

## 2024-02-27 NOTE — OP NOTE
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    DISPOSITION: Intermediate Care Facility    FINAL DIAGNOSIS:  <principal problem not specified>    FOLLOWUP: In clinic    DISCHARGE INSTRUCTIONS:    Discharge Procedure Orders   Tissue Culture - Aerobic   Order Comments: Sacral tissue culture     Tissue Culture - Aerobic   Order Comments: Right buttock tissue culture

## 2024-02-27 NOTE — ANESTHESIA POSTPROCEDURE EVALUATION
Anesthesia Post Evaluation    Patient: Antonio Galvan II    Procedure(s) Performed: Procedure(s) (LRB):  DEBRIDEMENT, PRESSURE ULCER (N/A)    Final Anesthesia Type: MAC      Patient location during evaluation: PACU  Patient participation: Yes- Able to Participate  Level of consciousness: awake and alert and oriented  Post-procedure vital signs: reviewed and stable  Pain management: adequate  Airway patency: patent    PONV status at discharge: No PONV  Anesthetic complications: no      Cardiovascular status: stable  Respiratory status: unassisted, spontaneous ventilation and room air  Hydration status: euvolemic  Follow-up not needed.              Vitals Value Taken Time   /78 02/27/24 1506   Temp 36.7 02/27/24 1507   Pulse 120 02/27/24 1506   Resp 25 02/27/24 1503   SpO2 98 % 02/27/24 1506   Vitals shown include unvalidated device data.      No case tracking events are documented in the log.      Pain/Allison Score: Pain Rating Prior to Med Admin: 6 (2/27/2024  9:34 AM)  Pain Rating Post Med Admin: 0 (2/27/2024  1:58 AM)  Allison Score: 8 (2/27/2024  2:59 PM)

## 2024-02-27 NOTE — CONSULTS
This is a patient well known to me who was admitted through the emergency room with signs of sepsis has multiple pressure ulcers the sacral pressure ulcer has a significant amount of necrotic tissue and he has purulent drainage from the right buttock ischial ulcer all of this will require surgical excisional debridement and intervention consents have been obtained we will proceed to the OR soon as possible

## 2024-02-27 NOTE — HISTORY AND PHYSICAL ADDENDUM
H&P completed on 2 24 24 has been reviewed, the patient has been examined and:  I concur with the findings and no changes have occurred since H&P was written.    Active Hospital Problems    Diagnosis  POA    *Infected pressure ulcer, unspecified pressure ulcer stage [L89.90, L08.9]  Yes      Resolved Hospital Problems   No resolved problems to display.

## 2024-02-27 NOTE — PROGRESS NOTES
Pharmacokinetic Initial Assessment: Tobramycin    Assessment:  Weight utilized for dose calculation: Adjusted Body Weight  Dosing method utilized: extended interval dosing    Plan: Extended interval dosing regimen: Tobramycin 560 mg IV once, followed by a random level to be drawn on 2/29 at 1800, 8-12 hours after the first dose.    Pharmacy will continue to monitor.    Please contact pharmacy at extension [phone] with any questions regarding this assessment.    Thank you for the consult,    Errol Garcia       Patient brief summary:  Antonio Galvan II is a 58 y.o. male initiated on aminoglycoside therapy for treatment of suspected skin & soft tissue infection    Drug Allergies:   Review of patient's allergies indicates:  No Known Allergies    Weights:   Wt Readings from Last 1 Encounters:   02/26/24 93.9 kg (207 lb)     Ideal body weight: 66.1 kg (145 lb 11.6 oz)  Adjusted ideal body weight: 77.2 kg (170 lb 3.8 oz)    Renal Function:   Estimated Creatinine Clearance: 90.1 mL/min (based on SCr of 1 mg/dL).,     CBC (last 72 hours):  Recent Labs   Lab Result Units 02/25/24  0526   WBC x10(3)/mcL 10.65   Hgb g/dL 8.4*   Hct % 27.0*   Platelet x10(3)/mcL 332   Mono % % 6.0   Eos % % 2.7   Basophil % % 0.2       Metabolic Panel (last 72 hours):  Recent Labs   Lab Result Units 02/25/24  0526   Sodium Level mmol/L 134*   Potassium Level mmol/L 4.9   Chloride mmol/L 105   Carbon Dioxide mmol/L 19*   Glucose Level mg/dL 213*   Blood Urea Nitrogen mg/dL 27.4*   Creatinine mg/dL 1.00   Albumin Level g/dL 2.0*   Bilirubin Total mg/dL 0.2   Alkaline Phosphatase unit/L 106   Aspartate Aminotransferase unit/L 10   Alanine Aminotransferase unit/L 15   Magnesium Level mg/dL 1.80   Phosphorus Level mg/dL 3.4       Microbiologic Results:  Microbiology Results (last 7 days)       ** No results found for the last 168 hours. **

## 2024-02-27 NOTE — PLAN OF CARE
[facility] - Medical Surgical Unit  Initial Discharge Assessment       Primary Care Provider: Jennifer Fernando NP    Admission Diagnosis: Infected pressure ulcer [L89.90, L08.9]    Admission Date: 2/26/2024  Expected Discharge Date:     Transition of Care Barriers: None    Payor: MEDICARE / Plan: MEDICARE PART A & B / Product Type: Government /     Extended Emergency Contact Information  Primary Emergency Contact: Judy Galvan  Mobile Phone: [phone]  Relation: Mother  Preferred language: English   needed? No  Secondary Emergency Contact: jolly Russell  Mobile Phone: [phone]  Relation: Significant other  Preferred language: English   needed? No    Discharge Plan A: Home Health, Home with family         [facility] - [address] [address]  Phone: [phone] Fax: [phone]    [id number] [address] [address]  Phone: [phone] Fax: [phone]      Initial Assessment (most recent)       Adult Discharge Assessment - 02/27/24 0754          Discharge Assessment    Assessment Type Discharge Planning Assessment     Confirmed/corrected address, phone number and insurance Yes     Confirmed Demographics Correct on Facesheet     Source of Information patient;family     If unable to respond/provide information was family/caregiver contacted? Yes     Contact Name/Number Judy echeverria [phone]     Communicated SADE with patient/caregiver Date not available/Unable to determine     People in Home parent(s)     Facility Arrived From: home     Do you expect to return to your current living situation? Yes     Do you have help at home or someone to help you manage your care at home? Yes     Who are your caregiver(s) and their phone number(s)? Judy echeverria [phone]     Prior to hospitilization cognitive  status: Alert/Oriented     Current cognitive status: Alert/Oriented     Walking or Climbing Stairs Difficulty yes     Walking or Climbing Stairs transferring difficulty, dependent     Dressing/Bathing Difficulty no     Dressing/Bathing bathing difficulty, assistance 1 person     Home Accessibility wheelchair accessible     Home Layout Able to live on 1st floor     Equipment Currently Used at Home bedside commode;power chair;wheelchair;walker, standard;lift device;hospital bed     Readmission within 30 days? No     Patient currently being followed by outpatient case management? No     Do you currently have service(s) that help you manage your care at home? No     Is the pt/caregiver preference to resume services with current agency Yes     Do you take prescription medications? Yes     Do you have prescription coverage? Yes     Do you have any problems affording any of your prescribed medications? TBD     Is the patient taking medications as prescribed? yes     Who is going to help you get home at discharge? Judy echeverria [phone]     How do you get to doctors appointments? family or friend will provide     Are you on dialysis? No     Do you take coumadin? No     Discharge Plan A Home Health;Home with family     DME Needed Upon Discharge  none     Discharge Plan discussed with: Patient;Parent(s)     Name(s) and Number(s) Judy echeverria [phone]     Transition of Care Barriers None        Physical Activity    On average, how many days per week do you engage in moderate to strenuous exercise (like a brisk walk)? 0 days     On average, how many minutes do you engage in exercise at this level? 0 min        Financial Resource Strain    How hard is it for you to pay for the very basics like food, housing, medical care, and heating? Not hard at all        Housing Stability    In the last 12 months, was there a time when you were not able to pay the mortgage or rent on time? No     In the last 12 months, how many places  have you lived? 1     In the last 12 months, was there a time when you did not have a steady place to sleep or slept in a shelter (including now)? No        Transportation Needs    In the past 12 months, has lack of transportation kept you from medical appointments or from getting medications? No     In the past 12 months, has lack of transportation kept you from meetings, work, or from getting things needed for daily living? No        Food Insecurity    Within the past 12 months, you worried that your food would run out before you got the money to buy more. Never true     Within the past 12 months, the food you bought just didn't last and you didn't have money to get more. Never true        Stress    Do you feel stress - tense, restless, nervous, or anxious, or unable to sleep at night because your mind is troubled all the time - these days? Only a little        Social Connections    In a typical week, how many times do you talk on the phone with family, friends, or neighbors? More than three times a week     How often do you get together with friends or relatives? More than three times a week     How often do you attend Cheondoism or Jewish services? More than 4 times per year     Do you belong to any clubs or organizations such as Cheondoism groups, unions, fraternal or athletic groups, or school groups? Yes     How often do you attend meetings of the clubs or organizations you belong to? 1 to 4 times per year     Are you , , , , never , or living with a partner? Never         Alcohol Use    Q1: How often do you have a drink containing alcohol? Never     Q2: How many drinks containing alcohol do you have on a typical day when you are drinking? Patient does not drink     Q3: How often do you have six or more drinks on one occasion? Never        OTHER    Name(s) of People in Home Judy mother [phone]

## 2024-02-27 NOTE — CONSULTS
Inpatient Nutrition Evaluation    Admit Date: 2/26/2024   Total duration of encounter: 1 day   Patient Age: 58 y.o.    Nutrition Recommendation/Prescription     Continue NPO except with sips with medications. 2. Advanced diet when medically appropriate to diabetic diet. 3. Weekly weights 4.  Monitor intake, wt, wounds, labs.     Nutrition Assessment     Chart Review    Reason Seen: continuous nutrition monitoring, length of stay, and follow-up    Malnutrition Screening Tool Results   Have you recently lost weight without trying?: Yes: 34 lbs or more  Have you been eating poorly because of a decreased appetite?: Yes   MST Score: 5   Diagnosis:  Infected wounds/sacrum, abd, feet  Klebsiella pneumoniae, Pseudomonas aeruginosa, Proteus mirabilis  DM,   Hypotension  Hyperglycemia  Relevant Medical History:   A-fib        Cardiac arrest       7/2022    Chronic skin ulcer      DM (diabetes mellitus)      Infected decubitus ulcer      Lymphedema of leg      Neurogenic bladder      Obesity, unspecified      Pressure ulcer of heel      Pressure ulcer of right foot, stage 3      Pressure ulcer of right ischium      Quadriplegia      Quadriplegic spinal paralysis        Scheduled Medications:  acetaminophen, 650 mg, QHS  ascorbic acid (vitamin C), 500 mg, Daily  aspirin, 81 mg, Daily  cetirizine, 10 mg, Daily  collagenase, , Daily  diphenhydrAMINE, 25 mg, QHS  ferrous sulfate, 1 tablet, Daily  [START ON 2/28/2024] insulin detemir U-100, 10 Units, Daily  meropenem (MERREM) IVPB, 1 g, Q8H  pantoprazole, 40 mg, Daily  SITagliptin phosphate, 100 mg, Daily  tobramycin IV (PEDS and ADULTS), 560 mg, Q24H    Continuous Infusions:   PRN Medications: sodium chloride 0.9%, acetaminophen, albuterol, benzonatate, bisacodyL, budesonide, calcium carbonate, cloNIDine, dextrose 10%, dextrose 10%, diphenhydrAMINE, glucagon (human recombinant), glucose, glucose, hydrALAZINE, HYDROcodone-acetaminophen, hydrOXYzine pamoate, insulin aspart U-100,  "loperamide, melatonin, metoprolol, ondansetron, simethicone, Pharmacy to dose Aminoglycosides consult **AND** tobramycin - pharmacy to dose, zolpidem    Recent Labs   Lab 02/23/24  1215 02/23/24  1445 02/23/24  1748 02/25/24  0526   NA  --  133*  --  134*   K  --  5.3*  --  4.9   CALCIUM  --  9.6  --  9.1   PHOS  --   --   --  3.4   MG  --   --   --  1.80   CHLORIDE  --  104  --  105   CO2  --  18*  --  19*   BUN  --  26.4*  --  27.4*   CREATININE  --  1.11  --  1.00   EGFRNORACEVR  --  >60  --  >60   GLUCOSE  --  327*  --  213*   BILITOT  --  0.3  --  0.2   ALKPHOS  --  105  --  106   ALT  --  16  --  15   AST  --  8  --  10   ALBUMIN  --  2.4*  --  2.0*   PREALB 14.3*  --   --   --    HGBA1C  --   --  8.5*  --    WBC  --  17.04*  --  10.65   HGB  --  10.3*  --  8.4*   HCT  --  33.3*  --  27.0*     Nutrition Orders:  Diet NPO Except for: Medication, Sips with Medication      Appetite/Oral Intake: NPO/NPO  Factors Affecting Nutritional Intake: NPO  Food/Alevism/Cultural Preferences:  NPO  Food Allergies: none reported  Last Bowel Movement: 02/26/24  Wound(s):      Comments    Pt NPO for surgery today, debridement. Will monitor.     Anthropometrics    Height: 5' 7" (170.2 cm), Height Method: Stated  Last Weight: 93.9 kg (207 lb) (02/26/24 1500), Weight Method: Bed Scale (Used bed weight from admission 2/23/24 as his bed does not have a scale)  BMI (Calculated): 32.4  BMI Classification: obese grade I (BMI 30-34.9)        Ideal Body Weight (IBW), Male: 148 lb     % Ideal Body Weight, Male (lb): 139.86 %                          Usual Weight Provided By: unable to obtain usual weight    Wt Readings from Last 5 Encounters:   02/26/24 93.9 kg (207 lb)   02/23/24 93.9 kg (207 lb)   08/16/23 106.4 kg (234 lb 9.6 oz)   08/15/23 113.4 kg (250 lb)   08/14/23 108.9 kg (240 lb)     Weight Change(s) Since Admission:   Wt Readings from Last 1 Encounters:   02/26/24 1500 93.9 kg (207 lb)   Admit Weight: 93.9 kg (207 lb) " (02/26/24 1500), Weight Method: Bed Scale (Used bed weight from admission 2/23/24 as his bed does not have a scale)    Patient Education     Not applicable.    Nutrition Goals & Monitoring     Dietitian will monitor: weight, weight change, electrolyte/renal panel, glucose/endocrine profile, gastrointestinal profile, and NPO    Nutrition Risk/Follow-Up: low (follow-up in 5-7 days)  Patients assigned 'low nutrition risk' status do not qualify for a full nutritional assessment but will be monitored and re-evaluated in a 5-7 day time period. Please consult if re-evaluation needed sooner.

## 2024-02-27 NOTE — PLAN OF CARE
Problem: Adult Inpatient Plan of Care  Goal: Plan of Care Review  Outcome: Ongoing, Progressing  Flowsheets (Taken 2/26/2024 2000)  Plan of Care Reviewed With:   patient   family  Goal: Patient-Specific Goal (Individualized)  Outcome: Ongoing, Progressing  Flowsheets (Taken 2/26/2024 2000)  Anxieties, Fears or Concerns: none verbalized at this time  Individualized Care Needs: monitor VS and blood sugar, IV abx  Goal: Absence of Hospital-Acquired Illness or Injury  Outcome: Ongoing, Progressing  Intervention: Identify and Manage Fall Risk  Flowsheets (Taken 2/26/2024 2000)  Safety Promotion/Fall Prevention:   assistive device/personal item within reach   bed alarm set   Fall Risk reviewed with patient/family   family to remain at bedside   high risk medications identified   medications reviewed   nonskid shoes/socks when out of bed   room near unit station   side rails raised x 3   instructed to call staff for mobility  Intervention: Prevent Skin Injury  Flowsheets (Taken 2/26/2024 2000)  Body Position: position maintained  Skin Protection:   adhesive use limited   incontinence pads utilized   tubing/devices free from skin contact  Intervention: Prevent and Manage VTE (Venous Thromboembolism) Risk  Flowsheets (Taken 2/26/2024 2000)  Activity Management: Rolling - L1  VTE Prevention/Management:   bleeding risk assessed   bleeding precations maintained  Range of Motion: ROM (range of motion) performed  Intervention: Prevent Infection  Flowsheets (Taken 2/26/2024 2000)  Infection Prevention:   cohorting utilized   environmental surveillance performed   hand hygiene promoted   rest/sleep promoted   single patient room provided  Goal: Optimal Comfort and Wellbeing  Outcome: Ongoing, Progressing  Intervention: Monitor Pain and Promote Comfort  Flowsheets (Taken 2/26/2024 2000)  Pain Management Interventions:   quiet environment facilitated   relaxation techniques promoted  Intervention: Provide Person-Centered  Care  Flowsheets (Taken 2/26/2024 2000)  Trust Relationship/Rapport:   care explained   empathic listening provided   questions answered   questions encouraged   thoughts/feelings acknowledged  Goal: Readiness for Transition of Care  Outcome: Ongoing, Progressing     Problem: Diabetes Comorbidity  Goal: Blood Glucose Level Within Targeted Range  Outcome: Ongoing, Progressing  Intervention: Monitor and Manage Glycemia  Flowsheets (Taken 2/26/2024 2000)  Glycemic Management: blood glucose monitored     Problem: Skin Injury Risk Increased  Goal: Skin Health and Integrity  Outcome: Ongoing, Progressing  Intervention: Optimize Skin Protection  Flowsheets (Taken 2/26/2024 2000)  Pressure Reduction Techniques: weight shift assistance provided  Pressure Reduction Devices: specialty bed utilized  Skin Protection:   adhesive use limited   incontinence pads utilized   tubing/devices free from skin contact  Head of Bed (HOB) Positioning: HOB at 20-30 degrees  Intervention: Promote and Optimize Oral Intake  Flowsheets (Taken 2/26/2024 2000)  Oral Nutrition Promotion:   calorie-dense foods provided   calorie-dense liquids provided     Problem: Impaired Wound Healing  Goal: Optimal Wound Healing  Outcome: Ongoing, Progressing  Intervention: Promote Wound Healing  Flowsheets (Taken 2/26/2024 2000)  Oral Nutrition Promotion:   calorie-dense foods provided   calorie-dense liquids provided  Sleep/Rest Enhancement:   awakenings minimized   regular sleep/rest pattern promoted  Activity Management: Rolling - L1  Pain Management Interventions:   quiet environment facilitated   relaxation techniques promoted     Problem: Fall Injury Risk  Goal: Absence of Fall and Fall-Related Injury  Outcome: Ongoing, Progressing  Intervention: Identify and Manage Contributors  Flowsheets (Taken 2/26/2024 2000)  Self-Care Promotion:   independence encouraged   BADL personal objects within reach   safe use of adaptive equipment encouraged  Medication  Review/Management:   medications reviewed   high-risk medications identified  Intervention: Promote Injury-Free Environment  Flowsheets (Taken 2/26/2024 2000)  Safety Promotion/Fall Prevention:   assistive device/personal item within reach   bed alarm set   Fall Risk reviewed with patient/family   family to remain at bedside   high risk medications identified   medications reviewed   nonskid shoes/socks when out of bed   room near unit station   side rails raised x 3   instructed to call staff for mobility

## 2024-02-27 NOTE — PT/OT/SLP PROGRESS
Name: Antonio Dumontfam PATTERSON    : 1965 (58 y.o.)  MRN: [id number]           Patient Unavailable      Patient unable to be seen at this time secondary to: Pt going to sx today, on hold per [facility] meeting. PT to resume once cleared post op.

## 2024-02-27 NOTE — OP NOTE
[facility] - Periop Services  Operative Note      Date of Procedure: 2/27/2024     Procedure: Procedure(s) (LRB):  DEBRIDEMENT, PRESSURE ULCER (N/A)     Surgeon(s) and Role:     * Keyonna Jean MD - Primary    Assisting Surgeon: None    Pre-Operative Diagnosis: Stage IV pressure ulcer of sacral region [L89.154]    Post-Operative Diagnosis: Post-Op Diagnosis Codes:     * Stage IV pressure ulcer of sacral region [L89.154]    Anesthesia: General    Operative Findings (including complications, if any):  Full-thickness necrosis of skin over the sacrum as well as evidence of necrotizing fasciitis in the right hip area with necrotic muscle and necrotic fascia as well as an undrained abscess    Description of Technical Procedures:  After obtaining consent the patient taken to the operating room placed on table supine position given IV sedation which tolerated well was then placed in left lateral decubitus position area was prepped and draped in a sterile fashion this included the sacrum as well as the right buttock and right hip area.  I began my dissection the sacral area where the patient had evidence of a full-thickness eschar scalpel pickups were used to excise nonviable skin subcutaneous tissue muscle down to viable muscle with a post debridement measurement of 6 x 8 x 3 cm hemostasis was assured with electrocautery.  Deep tissue cultures were obtained and sent for pathology.  The area was irrigated warm normal saline packed with quick clot Mesalt and Coban dressing.  I then turned my attention to the right buttock and hip wound upon retracting the upper aspect of the wound there was lexi necrotic tissue foul-smelling purulent material extruding from the area a scalpel [place] pickups were used to excise nonviable subcutaneous tissue muscle and fascia down to viable bone in the right iliac region.  Deep tissue cultures were obtained abscess pocket was encountered this fluid was drained completely.  I then  used the angled retractor to access the rest of the defect and then was able to visualize the margin of nonviable muscle and excise this area right at the viability margin using Metzenbaum scissors.  Deep tissue cultures were obtained from here as well.  Once I was satisfied that I would removed all the necrotic muscle and fat I then irrigated with copious amounts of warm normal saline.  Post debridement measurements were 15 x 12 x 8 cm the defect was packed with quick clot gauze hemostat and Mesalt packing as well as a gauze and ABD pad covered dressing.  This was secured with tape.  Patient tolerated the procedure well was transferred back to the holding area in stable condition    Significant Surgical Tasks Conducted by the Assistant(s), if Applicable:  Not applicable    Estimated Blood Loss (EBL): * No values recorded between 2/27/2024  2:23 PM and 2/27/2024  2:46 PM *           Implants: * No implants in log *    Specimens:   Specimen (24h ago, onward)      None                    Condition: Good    Disposition: PACU - hemodynamically stable.    Attestation: A qualified resident physician was not available.    Discharge Note    OUTCOME: Patient tolerated treatment/procedure well without complication and is now ready for discharge.    DISPOSITION: Intermediate Care Facility    FINAL DIAGNOSIS:  <principal problem not specified>    FOLLOWUP: In clinic    DISCHARGE INSTRUCTIONS:    Discharge Procedure Orders   Tissue Culture - Aerobic   Order Comments: Sacral tissue culture     Tissue Culture - Aerobic   Order Comments: Right buttock tissue culture

## 2024-02-27 NOTE — TRANSFER OF CARE
"Anesthesia Transfer of Care Note    Patient: Antonio Galvan II    Procedure(s) Performed: Procedure(s) (LRB):  DEBRIDEMENT, PRESSURE ULCER (N/A)    Patient location: PACU    Anesthesia Type: MAC    Transport from OR: Transported from OR on room air with adequate spontaneous ventilation    Post pain: adequate analgesia    Post assessment: no apparent anesthetic complications    Post vital signs: stable    Level of consciousness: awake, alert and oriented    Nausea/Vomiting: no nausea/vomiting    Complications: none    Transfer of care protocol was followedComments: /70    RR 20  O2 Sat 99  Temp 36.7      Last vitals: Visit Vitals  BP (!) 151/85   Pulse 110   Temp 36.8 °C (98.3 °F) (Temporal)   Resp 18   Ht 5' 7" (1.702 m)   Wt 93.9 kg (207 lb 0.2 oz)   SpO2 97%   BMI 32.42 kg/m²     "
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
## 2024-02-28 LAB
ALBUMIN SERPL-MCNC: 1.6 G/DL (ref 3.5–5)
ALBUMIN/GLOB SERPL: 0.4 RATIO (ref 1.1–2)
ALP SERPL-CCNC: 113 UNIT/L (ref 40–150)
ALT SERPL-CCNC: 14 UNIT/L (ref 0–55)
AST SERPL-CCNC: 7 UNIT/L (ref 5–34)
BASOPHILS # BLD AUTO: 0.03 X10(3)/MCL
BASOPHILS NFR BLD AUTO: 0.4 %
BILIRUB SERPL-MCNC: 0.2 MG/DL
BUN SERPL-MCNC: 23.7 MG/DL (ref 8.4–25.7)
CALCIUM SERPL-MCNC: 8.1 MG/DL (ref 8.4–10.2)
CHLORIDE SERPL-SCNC: 101 MMOL/L (ref 98–107)
CO2 SERPL-SCNC: 19 MMOL/L (ref 22–29)
CREAT SERPL-MCNC: 1.2 MG/DL (ref 0.73–1.18)
EOSINOPHIL # BLD AUTO: 0.2 X10(3)/MCL (ref 0–0.9)
EOSINOPHIL NFR BLD AUTO: 2.4 %
ERYTHROCYTE [DISTWIDTH] IN BLOOD BY AUTOMATED COUNT: 15.6 % (ref 11.5–17)
GFR SERPLBLD CREATININE-BSD FMLA CKD-EPI: >60 MLS/MIN/1.73/M2
GLOBULIN SER-MCNC: 3.8 GM/DL (ref 2.4–3.5)
GLUCOSE SERPL-MCNC: 334 MG/DL (ref 74–100)
GROUP & RH: NORMAL
HCT VFR BLD AUTO: 22.7 % (ref 42–52)
HGB BLD-MCNC: 6.6 G/DL (ref 14–18)
IMM GRANULOCYTES # BLD AUTO: 0.06 X10(3)/MCL (ref 0–0.04)
IMM GRANULOCYTES NFR BLD AUTO: 0.7 %
INDIRECT COOMBS: NORMAL
LYMPHOCYTES # BLD AUTO: 2.3 X10(3)/MCL (ref 0.6–4.6)
LYMPHOCYTES NFR BLD AUTO: 27.1 %
MCH RBC QN AUTO: 25 PG (ref 27–31)
MCHC RBC AUTO-ENTMCNC: 29.1 G/DL (ref 33–36)
MCV RBC AUTO: 86 FL (ref 80–94)
MONOCYTES # BLD AUTO: 0.56 X10(3)/MCL (ref 0.1–1.3)
MONOCYTES NFR BLD AUTO: 6.6 %
NEUTROPHILS # BLD AUTO: 5.35 X10(3)/MCL (ref 2.1–9.2)
NEUTROPHILS NFR BLD AUTO: 62.8 %
PLATELET # BLD AUTO: 279 X10(3)/MCL (ref 130–400)
PMV BLD AUTO: 11.2 FL (ref 7.4–10.4)
POCT GLUCOSE: 252 MG/DL (ref 70–110)
POCT GLUCOSE: 313 MG/DL (ref 70–110)
POCT GLUCOSE: 390 MG/DL (ref 70–110)
POTASSIUM SERPL-SCNC: 5.1 MMOL/L (ref 3.5–5.1)
PREALB SERPL-MCNC: 7.4 MG/DL (ref 18–45)
PROT SERPL-MCNC: 5.4 GM/DL (ref 6.4–8.3)
RBC # BLD AUTO: 2.64 X10(6)/MCL (ref 4.7–6.1)
SODIUM SERPL-SCNC: 128 MMOL/L (ref 136–145)
SPECIMEN OUTDATE: NORMAL
WBC # SPEC AUTO: 8.5 X10(3)/MCL (ref 4.5–11.5)

## 2024-02-28 PROCEDURE — 30233N1 TRANSFUSION OF NONAUTOLOGOUS RED BLOOD CELLS INTO PERIPHERAL VEIN, PERCUTANEOUS APPROACH: ICD-10-PCS | Performed by: INTERNAL MEDICINE

## 2024-02-28 PROCEDURE — 02HV33Z INSERTION OF INFUSION DEVICE INTO SUPERIOR VENA CAVA, PERCUTANEOUS APPROACH: ICD-10-PCS | Performed by: INTERNAL MEDICINE

## 2024-02-28 PROCEDURE — 36430 TRANSFUSION BLD/BLD COMPNT: CPT

## 2024-02-28 PROCEDURE — 99900035 HC TECH TIME PER 15 MIN (STAT)

## 2024-02-28 PROCEDURE — 94760 N-INVAS EAR/PLS OXIMETRY 1: CPT

## 2024-02-28 PROCEDURE — 36569 INSJ PICC 5 YR+ W/O IMAGING: CPT

## 2024-02-28 PROCEDURE — 85025 COMPLETE CBC W/AUTO DIFF WBC: CPT | Performed by: INTERNAL MEDICINE

## 2024-02-28 PROCEDURE — 86850 RBC ANTIBODY SCREEN: CPT | Performed by: PHYSICIAN ASSISTANT

## 2024-02-28 PROCEDURE — 86920 COMPATIBILITY TEST SPIN: CPT | Performed by: PHYSICIAN ASSISTANT

## 2024-02-28 PROCEDURE — C1751 CATH, INF, PER/CENT/MIDLINE: HCPCS

## 2024-02-28 PROCEDURE — 25000003 PHARM REV CODE 250: Performed by: PHYSICIAN ASSISTANT

## 2024-02-28 PROCEDURE — P9016 RBC LEUKOCYTES REDUCED: HCPCS | Performed by: PHYSICIAN ASSISTANT

## 2024-02-28 PROCEDURE — 63600175 PHARM REV CODE 636 W HCPCS: Performed by: PHYSICIAN ASSISTANT

## 2024-02-28 PROCEDURE — 80053 COMPREHEN METABOLIC PANEL: CPT | Performed by: INTERNAL MEDICINE

## 2024-02-28 PROCEDURE — 63600175 PHARM REV CODE 636 W HCPCS: Performed by: INTERNAL MEDICINE

## 2024-02-28 PROCEDURE — A4216 STERILE WATER/SALINE, 10 ML: HCPCS | Performed by: INTERNAL MEDICINE

## 2024-02-28 PROCEDURE — 11000004 HC SNF PRIVATE

## 2024-02-28 PROCEDURE — 25000003 PHARM REV CODE 250: Performed by: INTERNAL MEDICINE

## 2024-02-28 RX ORDER — HYDROCODONE BITARTRATE AND ACETAMINOPHEN 500; 5 MG/1; MG/1
TABLET ORAL
Status: DISCONTINUED | OUTPATIENT
Start: 2024-02-28 | End: 2024-04-09 | Stop reason: HOSPADM

## 2024-02-28 RX ORDER — SODIUM CHLORIDE 0.9 % (FLUSH) 0.9 %
10 SYRINGE (ML) INJECTION
Status: DISCONTINUED | OUTPATIENT
Start: 2024-02-28 | End: 2024-04-09 | Stop reason: HOSPADM

## 2024-02-28 RX ORDER — MUPIROCIN 20 MG/G
OINTMENT TOPICAL 2 TIMES DAILY
Status: DISCONTINUED | OUTPATIENT
Start: 2024-02-28 | End: 2024-02-29

## 2024-02-28 RX ORDER — SODIUM CHLORIDE 0.9 % (FLUSH) 0.9 %
10 SYRINGE (ML) INJECTION EVERY 6 HOURS
Status: DISCONTINUED | OUTPATIENT
Start: 2024-02-28 | End: 2024-04-09 | Stop reason: HOSPADM

## 2024-02-28 RX ADMIN — ASPIRIN 81 MG: 81 TABLET, COATED ORAL at 08:02

## 2024-02-28 RX ADMIN — PANTOPRAZOLE SODIUM 40 MG: 40 TABLET, DELAYED RELEASE ORAL at 08:02

## 2024-02-28 RX ADMIN — SODIUM CHLORIDE, PRESERVATIVE FREE 10 ML: 5 INJECTION INTRAVENOUS at 07:02

## 2024-02-28 RX ADMIN — INSULIN ASPART 10 UNITS: 100 INJECTION, SOLUTION INTRAVENOUS; SUBCUTANEOUS at 05:02

## 2024-02-28 RX ADMIN — CETIRIZINE HYDROCHLORIDE 10 MG: 10 TABLET, FILM COATED ORAL at 08:02

## 2024-02-28 RX ADMIN — TOBRAMYCIN 540 MG: 40 INJECTION INTRAMUSCULAR; INTRAVENOUS at 03:02

## 2024-02-28 RX ADMIN — SODIUM CHLORIDE: 9 INJECTION, SOLUTION INTRAVENOUS at 05:02

## 2024-02-28 RX ADMIN — MEROPENEM 1 G: 1 INJECTION, POWDER, FOR SOLUTION INTRAVENOUS at 08:02

## 2024-02-28 RX ADMIN — SODIUM CHLORIDE: 9 INJECTION, SOLUTION INTRAVENOUS at 08:02

## 2024-02-28 RX ADMIN — INSULIN ASPART 5 UNITS: 100 INJECTION, SOLUTION INTRAVENOUS; SUBCUTANEOUS at 08:02

## 2024-02-28 RX ADMIN — MEROPENEM 1 G: 1 INJECTION, POWDER, FOR SOLUTION INTRAVENOUS at 05:02

## 2024-02-28 RX ADMIN — Medication 6 MG: at 09:02

## 2024-02-28 RX ADMIN — ACETAMINOPHEN 650 MG: 325 TABLET ORAL at 02:02

## 2024-02-28 RX ADMIN — HYDROCODONE BITARTRATE AND ACETAMINOPHEN 1 TABLET: 5; 325 TABLET ORAL at 08:02

## 2024-02-28 RX ADMIN — DIPHENHYDRAMINE HYDROCHLORIDE 25 MG: 25 CAPSULE ORAL at 08:02

## 2024-02-28 RX ADMIN — INSULIN ASPART 8 UNITS: 100 INJECTION, SOLUTION INTRAVENOUS; SUBCUTANEOUS at 05:02

## 2024-02-28 RX ADMIN — HYDROCODONE BITARTRATE AND ACETAMINOPHEN 1 TABLET: 5; 325 TABLET ORAL at 03:02

## 2024-02-28 RX ADMIN — MUPIROCIN: 20 OINTMENT TOPICAL at 09:02

## 2024-02-28 RX ADMIN — OXYCODONE HYDROCHLORIDE AND ACETAMINOPHEN 500 MG: 500 TABLET ORAL at 08:02

## 2024-02-28 RX ADMIN — MEROPENEM 1 G: 1 INJECTION, POWDER, FOR SOLUTION INTRAVENOUS at 12:02

## 2024-02-28 RX ADMIN — FERROUS SULFATE TAB 325 MG (65 MG ELEMENTAL FE) 1 EACH: 325 (65 FE) TAB at 08:02

## 2024-02-28 RX ADMIN — SODIUM CHLORIDE: 9 INJECTION, SOLUTION INTRAVENOUS at 03:02

## 2024-02-28 RX ADMIN — INSULIN DETEMIR 10 UNITS: 100 INJECTION, SOLUTION SUBCUTANEOUS at 08:02

## 2024-02-28 RX ADMIN — ACETAMINOPHEN 650 MG: 325 TABLET ORAL at 08:02

## 2024-02-28 RX ADMIN — SITAGLIPTIN 100 MG: 50 TABLET, FILM COATED ORAL at 08:02

## 2024-02-28 NOTE — PLAN OF CARE
Problem: Adult Inpatient Plan of Care  Goal: Plan of Care Review  Outcome: Ongoing, Progressing  Flowsheets (Taken 2/27/2024 1803)  Plan of Care Reviewed With: patient  Goal: Patient-Specific Goal (Individualized)  Outcome: Ongoing, Progressing  Flowsheets (Taken 2/27/2024 1803)  Anxieties, Fears or Concerns: Pt denies  Individualized Care Needs: Monitor for wound bleeding, ABX therapy, monitor vital signs and blood glucose  Goal: Absence of Hospital-Acquired Illness or Injury  Outcome: Ongoing, Progressing  Intervention: Identify and Manage Fall Risk  Flowsheets (Taken 2/27/2024 1803)  Safety Promotion/Fall Prevention:   side rails raised x 3   medications reviewed  Intervention: Prevent Skin Injury  Flowsheets (Taken 2/27/2024 1803)  Body Position: supine  Skin Protection:   adhesive use limited   incontinence pads utilized   tubing/devices free from skin contact  Intervention: Prevent and Manage VTE (Venous Thromboembolism) Risk  Flowsheets (Taken 2/27/2024 1803)  Activity Management: Rolling - L1  VTE Prevention/Management:   bleeding risk assessed   bleeding precations maintained  Range of Motion: ROM (range of motion) performed  Intervention: Prevent Infection  Flowsheets (Taken 2/27/2024 1803)  Infection Prevention:   cohorting utilized   environmental surveillance performed   equipment surfaces disinfected   hand hygiene promoted   personal protective equipment utilized   rest/sleep promoted   single patient room provided  Goal: Optimal Comfort and Wellbeing  Outcome: Ongoing, Progressing  Intervention: Monitor Pain and Promote Comfort  Flowsheets (Taken 2/27/2024 1803)  Pain Management Interventions:   care clustered   medication offered   pain management plan reviewed with patient/caregiver   pillow support provided   position adjusted   quiet environment facilitated  Intervention: Provide Person-Centered Care  Flowsheets (Taken 2/27/2024 1803)  Trust Relationship/Rapport:   care explained   choices  provided   emotional support provided   empathic listening provided   questions answered   questions encouraged   reassurance provided   thoughts/feelings acknowledged  Goal: Readiness for Transition of Care  Outcome: Ongoing, Progressing     Problem: Diabetes Comorbidity  Goal: Blood Glucose Level Within Targeted Range  Outcome: Ongoing, Progressing     Problem: Skin Injury Risk Increased  Goal: Skin Health and Integrity  Outcome: Ongoing, Progressing     Problem: Impaired Wound Healing  Goal: Optimal Wound Healing  Outcome: Ongoing, Progressing     Problem: Fall Injury Risk  Goal: Absence of Fall and Fall-Related Injury  Outcome: Ongoing, Progressing

## 2024-02-28 NOTE — PROGRESS NOTES
[facility]  [facility] MEDICINE - H&P ADMISSION NOTE    Patient Name: Antonio Galvan II  MRN: [id number]  Patient Class: IP- Swing   Admission Date: 2/26/2024   Admitting Physician:  Service   Attending Physician: ANTHONY Vazquez  Primary Care Provider: Jennifer Fernando NP  Face-to-Face encounter date: 02/28/2024      CHIEF COMPLAINT   No chief complaint on file.    HISTORY OF PRESENTING ILLNESS     This 58-year-old man with quadriplegic spinal paralysis and numerous decubitus ulcers who is followed by wound care is brought in today due to fever. He had wound cultures done 3 days ago that have grown Proteus mirabilis and Klebsiella pneumoniae. Sensitivities are not complete. The wounds were noted to have significant odor and heavy green drainage. In addition the patient had some nausea and vomiting and home health noted that his blood pressure was dipping. He was advised to come to the emergency room. Wound cultures were drawn from the right hip wound. His wife reports that when the wound is pressed above pus comes out. Both Klebsiella and Proteus maybe treated with fluoroquinolones which are both IV and oral. The patient's wife preferred if the patient came home so that she could attend to his wounds however the patient expressed a desire to be admitted.     We will admit for IV abx and wound care and a consult to Dr. Jean when he is on campus for possible debridment.     No reported complaints today.  Levaquin was discontinued and cefepime initiated based off of culture sensitivity report.  Patient transitioned to our swing unit.    Today:  Patient underwent debridement with Dr. Jean yesterday with tissue culture from right hip and sacrum.  Right hip tissue culture currently showing moderate Gram-negative rods.  Lab test morning significant for H&H 6.6/22.7, sodium 128.  Patient will receive 2 units PRBC today.  Patient noted to be hypotensive as well, holding  antihypertensives.  Bolus 500 cc NS prior to receiving blood today.    PAST MEDICAL HISTORY     Past Medical History:   Diagnosis Date    A-fib     Cardiac arrest     7/2022    Chronic skin ulcer     DM (diabetes mellitus)     Infected decubitus ulcer     Lymphedema of leg     Neurogenic bladder     Obesity, unspecified     Pressure ulcer of heel     Pressure ulcer of right foot, stage 3     Pressure ulcer of right ischium     Quadriplegia     Quadriplegic spinal paralysis      PAST SURGICAL HISTORY     Past Surgical History:   Procedure Laterality Date    APPLICATION OF WOUND VACUUM-ASSISTED CLOSURE DEVICE Right 5/12/2023    Procedure: APPLICATION, WOUND VAC;  Surgeon: Keyonna Jean MD;  Location: [facility] OR;  Service: General;  Laterality: Right;    DEBRIDEMENT OF BUTTOCKS Right 5/12/2023    Procedure: DEBRIDEMENT, BUTTOCK;  Surgeon: Keyonna Jean MD;  Location: [facility] OR;  Service: General;  Laterality: Right;    INCISION AND DRAINAGE HIP Bilateral 8/16/2023    Procedure: INCISION AND DRAINAGE, HIP;  Surgeon: Ryan Tirado MD;  Location: [facility] OR;  Service: General;  Laterality: Bilateral;  1. Excisional debridement skin to bone, skin to bone with biopsy and debridement of hard bone at the Left hip necrotic wound 11 cm long 5-1/2 cm wide 4-1/2 cm deep upon completion of debridement   2. Excisional debridement skin to muscle right hip with debridement     FAMILY HISTORY   Reviewed and noncontributory to this case    SOCIAL HISTORY     Social History     Socioeconomic History    Marital status: Single   Tobacco Use    Smoking status: Never    Smokeless tobacco: Never   Substance and Sexual Activity    Alcohol use: Never    Drug use: Never    Sexual activity: Not Currently     Social Determinants of Health     Financial Resource Strain: Low Risk  (2/27/2024)    Overall Financial Resource Strain (CARDIA)     Difficulty of Paying Living Expenses: Not hard at all   Food Insecurity: No Food Insecurity  (2/27/2024)    Hunger Vital Sign     Worried About Running Out of Food in the Last Year: Never true     Ran Out of Food in the Last Year: Never true   Transportation Needs: No Transportation Needs (2/27/2024)    PRAPARE - Transportation     Lack of Transportation (Medical): No     Lack of Transportation (Non-Medical): No   Physical Activity: Inactive (2/27/2024)    Exercise Vital Sign     Days of Exercise per Week: 0 days     Minutes of Exercise per Session: 0 min   Stress: No Stress Concern Present (2/27/2024)    [employer] - Occupational Stress Questionnaire     Feeling of Stress : Only a little   Recent Concern: Stress - Stress Concern Present (2/24/2024)    [employer] - Occupational Stress Questionnaire     Feeling of Stress : To some extent   Social Connections: Moderately Integrated (2/27/2024)    Social Connection and Isolation Panel [NHANES]     Frequency of Communication with Friends and Family: More than three times a week     Frequency of Social Gatherings with Friends and Family: More than three times a week     Attends Restoration Services: More than 4 times per year     Active Member of Clubs or Organizations: Yes     Attends Club or Organization Meetings: 1 to 4 times per year     Marital Status: Never    Housing Stability: Low Risk  (2/27/2024)    Housing Stability Vital Sign     Unable to Pay for Housing in the Last Year: No     Number of Places Lived in the Last Year: 1     Unstable Housing in the Last Year: No     HOME MEDICATIONS     Prior to Admission medications    Medication Sig Start Date End Date Taking? Authorizing Provider   albuterol (PROVENTIL) 2.5 mg /3 mL (0.083 %) nebulizer solution Inhale 2.5 mg into the lungs. 12/8/21 9/28/23  Provider, Historical   ALKALOL NASAL WASH Soln 50 mLs by Nasal route once daily. 8/15/22   Provider, Historical   ascorbic acid, vitamin C, (VITAMIN C) 500 MG tablet Take 1 tablet (500 mg total)  by mouth once daily. 9/28/23   Jeanette Mann MD   aspirin (ECOTRIN) 81 MG EC tablet Take 1 tablet (81 mg total) by mouth once daily. 9/28/23 9/27/24  Jeanette Mann MD   bisacodyL (DULCOLAX) 10 mg Supp Place 1 suppository (10 mg total) rectally daily as needed (Until bowel movement if patient has no bowel movement for 2 days). 9/27/23   Jeanette Mann MD   budesonide (PULMICORT) 0.5 mg/2 mL nebulizer solution Take 2 mLs (0.5 mg total) by nebulization daily as needed (Wheezing, SOB). Controller 9/27/23 9/26/24  Jeanette Mann MD   carvediloL (COREG) 12.5 MG tablet Take 1 tablet (12.5 mg total) by mouth 2 (two) times daily with meals. 9/27/23 9/26/24  Jeanette Mann MD   collagenase (SANTYL) ointment Apply topically once daily.  Patient not taking: Reported on 9/28/2023 5/25/23   Syl Avila MD   doxycycline (VIBRAMYCIN) 100 MG Cap Take 1 capsule (100 mg total) by mouth 2 (two) times daily. for 10 days 2/20/24 3/1/24  Ronald Barcenas MD   ferrous sulfate 325 (65 FE) MG EC tablet Take 1 tablet (325 mg total) by mouth once daily. 9/27/23   Jeanette Mann MD   insulin detemir U-100 (LEVEMIR) 100 unit/mL injection Inject 7 Units into the skin every evening.  Patient not taking: Reported on 9/28/2023 9/27/23 9/26/24  Jeanette Mann MD   JANUVIA 50 mg Tab Take 100 mg by mouth once daily. 5/4/22   Provider, Historical   polyethylene glycol (GLYCOLAX) 17 gram PwPk Take 17 g by mouth 3 (three) times daily as needed (nausea vomiting).  Patient not taking: Reported on 9/28/2023 9/27/23   Jeanette Mann MD     ALLERGIES   Patient has no known allergies.    REVIEW OF SYSTEMS   Except as documented above, all other systems reviewed and negative    PHYSICAL EXAM     Vitals:    02/28/24 0315   BP: (!) 99/59   Pulse: 75   Resp: 17   Temp: 99.5 °F (37.5 °C)        General:  In no acute distress, resting comfortably  Head and neck:  Atraumatic, normocephalic, moist mucous membranes, supple  neck  Chest:  Clear to auscultation bilaterally  Heart:  S1, S2, no appreciable murmur  Abdomen:  Soft, nontender, BS +  MSK:  Warm, no lower extremity edema, no clubbing or cyanosis  Neuro:  Alert and oriented x4, moving all extremities with good strength  Integumentary:  No obvious skin rash  Psychiatry:  Appropriate mood and affect    ASSESSMENT AND PLAN     Multiple infected wounds to sacrum, abdomen, feet  Klebsiella pneumoniae, Proteus mirabilis, Pseudomonas aeruginosa  Discontinue Levaquin based off of sensitivity report (02/23/2024-02/26/2024)  Transitioned to cefepime on 02/26/2024  Cefepime discontinued on 02/27/2024 continued hyperglycemia as it is mixed with D5W  Pharmacy recommended to begin Meerem 1g Q8 on 02/27/2024  Tobramycin per pharmacy dosing 02/27/2024 x14 days   Blood cultures remain negative at this time  S/p debridement with Dr. Jean on 02/27/2024    Post op anemia   HH&H 6.6/22.7  2u pRBC today   Repeat labs in am   Monitor for bleeding   No anticoagulation at this time - aspirin d/c 02/28/2024     Hyperglycemia in setting of DM  A1c 02/23/2024 8.5%  Continue home Sitagliptin  Continue detemir 10u q.a.m. and moderate dose SSI and titrate as needed  Antibiotics changed from being mixed with D5W to NS     Hypotension   500 cc bolus of NS today  Hold antihypertensives if applicable  Receiving blood transfusion     Hx of: Neurogenic bladder, Quadriplegia     DVT prophylaxis:  SCDs, history of bleeding in the recent past  __________________________________________________________________  LABS/MICRO/MEDS/DIAGNOSTICS     LABS  Recent Labs     02/28/24  0454   *   K 5.1   CHLORIDE 101   CO2 19*   BUN 23.7   CREATININE 1.20*   GLUCOSE 334*   CALCIUM 8.1*   ALKPHOS 113   AST 7   ALT 14   ALBUMIN 1.6*     Recent Labs     02/28/24  0454   WBC 8.50   RBC 2.64*   HCT 22.7*   MCV 86.0        MICROBIOLOGY  Microbiology Results (last 7 days)       ** No results found for the last 168  hours. **          MEDICATIONS   acetaminophen  650 mg Oral QHS    ascorbic acid (vitamin C)  500 mg Oral Daily    aspirin  81 mg Oral Daily    cetirizine  10 mg Oral Daily    collagenase   Topical (Top) Daily    diphenhydrAMINE  25 mg Oral QHS    ferrous sulfate  1 tablet Oral Daily    insulin detemir U-100  10 Units Subcutaneous Daily    meropenem (MERREM) IVPB  1 g Intravenous Q8H    pantoprazole  40 mg Oral Daily    SITagliptin phosphate  100 mg Oral Daily    tobramycin IV (PEDS and ADULTS)  540 mg Intravenous Q24H      INFUSIONS    DIAGNOSTIC TESTS  No orders to display      Patient information was obtained from patient, patient's family, past medical records and ER records.   All diagnosis and differential diagnosis have been reviewed; assessment and plan has been documented. I have personally reviewed the labs and test results that are presently available; I have reviewed the patients medication list. I have reviewed the consulting providers response and recommendations. I have reviewed or attempted to review medical records based upon their availability.  All of the patient's questions have been addressed and answered. Patient's is agreeable to the above stated plan. I will continue to monitor closely and make adjustments to medical management as needed.  This note was created using [employer] voice recognition software that occasionally misinterpreted phrases or words.  Please contact me if any questions may rise regarding documentation to clarify verbiage.      ANTHONY Vazquez   Internal Medicine  Department of Hospital Medicine  [facility]

## 2024-02-28 NOTE — PLAN OF CARE
Problem: Adult Inpatient Plan of Care  Goal: Plan of Care Review  Outcome: Ongoing, Progressing  Flowsheets (Taken 2/27/2024 2000)  Plan of Care Reviewed With:   patient   family   significant other  Goal: Patient-Specific Goal (Individualized)  Outcome: Ongoing, Progressing  Flowsheets (Taken 2/27/2024 2000)  Anxieties, Fears or Concerns: none verbalized at this time  Individualized Care Needs: monitor wound for bleeding, IV abx, monitor vital signs an blood glucose  Goal: Absence of Hospital-Acquired Illness or Injury  Outcome: Ongoing, Progressing  Intervention: Identify and Manage Fall Risk  Flowsheets (Taken 2/27/2024 2000)  Safety Promotion/Fall Prevention:   assistive device/personal item within reach   bed alarm set   Fall Risk reviewed with patient/family   Fall Risk signage in place   family to remain at bedside   high risk medications identified   medications reviewed   nonskid shoes/socks when out of bed   room near unit station   side rails raised x 3   instructed to call staff for mobility  Intervention: Prevent Skin Injury  Flowsheets (Taken 2/27/2024 2000)  Body Position: position maintained  Skin Protection:   adhesive use limited   incontinence pads utilized   tubing/devices free from skin contact  Intervention: Prevent and Manage VTE (Venous Thromboembolism) Risk  Flowsheets (Taken 2/27/2024 2000)  Activity Management: Rolling - L1  VTE Prevention/Management:   bleeding risk assessed   bleeding precations maintained  Range of Motion: ROM (range of motion) performed  Intervention: Prevent Infection  Flowsheets (Taken 2/27/2024 2000)  Infection Prevention:   environmental surveillance performed   hand hygiene promoted   cohorting utilized   rest/sleep promoted   single patient room provided  Goal: Optimal Comfort and Wellbeing  Outcome: Ongoing, Progressing  Intervention: Monitor Pain and Promote Comfort  Flowsheets (Taken 2/27/2024 2000)  Pain Management Interventions:   quiet environment  facilitated   relaxation techniques promoted  Intervention: Provide Person-Centered Care  Flowsheets (Taken 2/27/2024 2000)  Trust Relationship/Rapport:   empathic listening provided   questions answered   care explained   questions encouraged   thoughts/feelings acknowledged  Goal: Readiness for Transition of Care  Outcome: Ongoing, Progressing     Problem: Diabetes Comorbidity  Goal: Blood Glucose Level Within Targeted Range  Outcome: Ongoing, Progressing  Intervention: Monitor and Manage Glycemia  Flowsheets (Taken 2/27/2024 2000)  Glycemic Management: blood glucose monitored     Problem: Skin Injury Risk Increased  Goal: Skin Health and Integrity  Outcome: Ongoing, Progressing  Intervention: Optimize Skin Protection  Flowsheets (Taken 2/27/2024 2000)  Pressure Reduction Techniques: weight shift assistance provided  Pressure Reduction Devices: specialty bed utilized  Skin Protection:   adhesive use limited   incontinence pads utilized   tubing/devices free from skin contact  Head of Bed (HOB) Positioning: HOB at 20-30 degrees  Intervention: Promote and Optimize Oral Intake  Flowsheets (Taken 2/27/2024 2000)  Oral Nutrition Promotion:   calorie-dense foods provided   calorie-dense liquids provided     Problem: Impaired Wound Healing  Goal: Optimal Wound Healing  Outcome: Ongoing, Progressing  Intervention: Promote Wound Healing  Flowsheets (Taken 2/27/2024 2000)  Oral Nutrition Promotion:   calorie-dense foods provided   calorie-dense liquids provided  Sleep/Rest Enhancement:   awakenings minimized   regular sleep/rest pattern promoted  Activity Management: Rolling - L1  Pain Management Interventions:   quiet environment facilitated   relaxation techniques promoted     Problem: Fall Injury Risk  Goal: Absence of Fall and Fall-Related Injury  Outcome: Ongoing, Progressing  Intervention: Identify and Manage Contributors  Flowsheets (Taken 2/27/2024 2000)  Self-Care Promotion:   independence encouraged   BADL  personal objects within reach   safe use of adaptive equipment encouraged  Medication Review/Management:   medications reviewed   high-risk medications identified  Intervention: Promote Injury-Free Environment  Flowsheets (Taken 2/27/2024 2000)  Safety Promotion/Fall Prevention:   assistive device/personal item within reach   bed alarm set   Fall Risk reviewed with patient/family   Fall Risk signage in place   family to remain at bedside   high risk medications identified   medications reviewed   nonskid shoes/socks when out of bed   room near unit station   side rails raised x 3   instructed to call staff for mobility

## 2024-02-28 NOTE — PT/OT/SLP PROGRESS
Name: Antonio Dumontfam PATTERSON    : 1965 (58 y.o.)  MRN: [id number]           Patient Unavailable      Patient unable to be seen at this time secondary to: Pt getting blood post op today, Eval on hold. PT to continue following up.

## 2024-02-28 NOTE — PROCEDURES
"Antonio Galvan II is a 58 y.o. male patient.    Temp: 98.7 °F (37.1 °C) (02/28/24 1205)  Pulse: 80 (02/28/24 1205)  Resp: 20 (02/28/24 1205)  BP: (!) 174/96 (02/28/24 1205)  SpO2: 98 % (02/28/24 1205)  Weight: 93.9 kg (207 lb) (02/26/24 1500)  Height: 5' 7" (170.2 cm) (02/26/24 1500)    PICC  Time out: Immediately prior to procedure a time out was called to verify the correct patient, procedure, equipment, support staff and site/side marked as required  Indications: med administration  Preparation: skin prepped with ChloraPrep  Skin prep agent dried: skin prep agent completely dried prior to procedure  Sterile barriers: all five maximum sterile barriers used - cap, mask, sterile gown, sterile gloves, and large sterile sheet  Hand hygiene: hand hygiene performed prior to central venous catheter insertion  Location details: left basilic  Catheter type: double lumen  Catheter size: 5 Fr  Catheter Length: 43cm    Ultrasound guidance: yes  Needle advanced into vessel with real time Ultrasound guidance.  Guidewire confirmed in vessel.  Sterile sheath used.            Name sherron  2/28/2024    "

## 2024-02-28 NOTE — PROGRESS NOTES
Surgical debridement of Sacrum / R ischium performed per Dr. Jean on 2/27/24.   Surgical dressings removed today.  No active bleeding noted to sacral ulceration, cauterization noted within wound bed.  R ischial ulceration outer dressing removed, no active bleeding from shallow flat wound bed, Quikclot packing intact to R superiorlateral undermining.  Upon attempting to remove packing, active bleeding started from area.  Applied another Quikclot gauze to area for hemostasis, monitored for 5 min, no additional active bleeding occurred, applied collagen to open flattened area covered with gauze, abd, medfix tape.   Blood transfusion in process during assessment.   Will attempt to remove packing with MD tomorrow and apply wound vac to sacral ulceration if able.     02/28/24 1619   WOCN Assessment   WOCN Total Time (mins) 35   Visit Date 02/28/24        Altered Skin Integrity 01/12/23 1530 Sacral spine Full thickness tissue loss with exposed bone, tendon, or muscle. Often includes undermining and tunneling. May extend into muscle and/or supporting structures.   Date First Assessed/Time First Assessed: 01/12/23 1530   Altered Skin Integrity Present on Admission - Did Patient arrive to the hospital with altered skin?: yes  Location: Sacral spine  Description of Altered Skin Integrity: Full thickness tissue los...   Wound Image    Dressing Appearance Intact;Moist drainage   Drainage Amount Large   Drainage Characteristics/Odor Sanguineous;Serosanguineous;No odor   Appearance Pink;Red;Tan;White;Not granulating;Black  (Black Cauterization)   Tissue loss description Full thickness   Periwound Area Denuded;Redness;Excoriated;Scar tissue;Moist   Wound Edges Irregular   Wound Length (cm) 9.5 cm  (area of depth measured)   Wound Width (cm) 8 cm  (area of depth measured)   Wound Depth (cm) 2.5 cm  (area of depth measured)   Wound Volume (cm^3) 190 cm^3   Wound Surface Area (cm^2) 76 cm^2   Care Cleansed with:;Antimicrobial  agent   Dressing Applied;Gauze;Absorptive Pad;Other (comment)  (medfix tape)   Packing packed with;other (see comment)  (Vashe moistened 4x4 gauze)        Incision/Site 02/27/24 1450 Right Buttocks lateral   Date First Assessed/Time First Assessed: 02/27/24 1450   Side: Right  Location: Buttocks  Orientation: lateral  Additional Comments: mesalt, quick clot, gauze, abdomen pad, and tape   Wound Image    Dressing Appearance Intact;Moist drainage   Drainage Amount Large   Drainage Characteristics/Odor Serosanguineous;Sanguineous   Appearance Red;Granulating  (did not remove Quikclot packing from area of undermining / tunnelling on R superiolateral aspect today)   Red (%), Wound Tissue Color 100 %   Periwound Area Intact;Dry   Care Cleansed with:;Wound cleanser   Dressing Applied;Collagen;Gauze;Absorptive Pad  (medfix tape)

## 2024-02-29 LAB
ABO + RH BLD: NORMAL
ANION GAP SERPL CALC-SCNC: 11 MEQ/L
BACTERIA BLD CULT: NORMAL
BACTERIA BLD CULT: NORMAL
BASOPHILS # BLD AUTO: 0.03 X10(3)/MCL
BASOPHILS NFR BLD AUTO: 0.4 %
BLD PROD TYP BPU: NORMAL
BLOOD UNIT EXPIRATION DATE: NORMAL
BLOOD UNIT TYPE CODE: 7300
BUN SERPL-MCNC: 20.4 MG/DL (ref 8.4–25.7)
CALCIUM SERPL-MCNC: 8 MG/DL (ref 8.4–10.2)
CHLORIDE SERPL-SCNC: 106 MMOL/L (ref 98–107)
CO2 SERPL-SCNC: 19 MMOL/L (ref 22–29)
CREAT SERPL-MCNC: 1.01 MG/DL (ref 0.73–1.18)
CREAT/UREA NIT SERPL: 20
CROSSMATCH INTERPRETATION: NORMAL
DISPENSE STATUS: NORMAL
EOSINOPHIL # BLD AUTO: 0.33 X10(3)/MCL (ref 0–0.9)
EOSINOPHIL NFR BLD AUTO: 4.4 %
ERYTHROCYTE [DISTWIDTH] IN BLOOD BY AUTOMATED COUNT: 15.7 % (ref 11.5–17)
GFR SERPLBLD CREATININE-BSD FMLA CKD-EPI: >60 MLS/MIN/1.73/M2
GLUCOSE SERPL-MCNC: 359 MG/DL (ref 74–100)
HCT VFR BLD AUTO: 27.7 % (ref 42–52)
HGB BLD-MCNC: 8.9 G/DL (ref 14–18)
IMM GRANULOCYTES # BLD AUTO: 0.06 X10(3)/MCL (ref 0–0.04)
IMM GRANULOCYTES NFR BLD AUTO: 0.8 %
LYMPHOCYTES # BLD AUTO: 1.81 X10(3)/MCL (ref 0.6–4.6)
LYMPHOCYTES NFR BLD AUTO: 24.1 %
MCH RBC QN AUTO: 27.3 PG (ref 27–31)
MCHC RBC AUTO-ENTMCNC: 32.1 G/DL (ref 33–36)
MCV RBC AUTO: 85 FL (ref 80–94)
MONOCYTES # BLD AUTO: 0.47 X10(3)/MCL (ref 0.1–1.3)
MONOCYTES NFR BLD AUTO: 6.3 %
NEUTROPHILS # BLD AUTO: 4.82 X10(3)/MCL (ref 2.1–9.2)
NEUTROPHILS NFR BLD AUTO: 64 %
PLATELET # BLD AUTO: 261 X10(3)/MCL (ref 130–400)
PMV BLD AUTO: 10.3 FL (ref 7.4–10.4)
POTASSIUM SERPL-SCNC: 4.5 MMOL/L (ref 3.5–5.1)
RBC # BLD AUTO: 3.26 X10(6)/MCL (ref 4.7–6.1)
SODIUM SERPL-SCNC: 136 MMOL/L (ref 136–145)
TOBRAMYCIN PEAK SERPL-MCNC: 10.6 UG/ML (ref 4–8)
UNIT NUMBER: NORMAL
WBC # SPEC AUTO: 7.52 X10(3)/MCL (ref 4.5–11.5)

## 2024-02-29 PROCEDURE — 94760 N-INVAS EAR/PLS OXIMETRY 1: CPT

## 2024-02-29 PROCEDURE — 85025 COMPLETE CBC W/AUTO DIFF WBC: CPT | Performed by: PHYSICIAN ASSISTANT

## 2024-02-29 PROCEDURE — 25000003 PHARM REV CODE 250: Performed by: INTERNAL MEDICINE

## 2024-02-29 PROCEDURE — P9016 RBC LEUKOCYTES REDUCED: HCPCS | Performed by: PHYSICIAN ASSISTANT

## 2024-02-29 PROCEDURE — 11000004 HC SNF PRIVATE

## 2024-02-29 PROCEDURE — 63600175 PHARM REV CODE 636 W HCPCS: Performed by: PHYSICIAN ASSISTANT

## 2024-02-29 PROCEDURE — 25000003 PHARM REV CODE 250: Performed by: PHYSICIAN ASSISTANT

## 2024-02-29 PROCEDURE — 80200 ASSAY OF TOBRAMYCIN: CPT | Performed by: STUDENT IN AN ORGANIZED HEALTH CARE EDUCATION/TRAINING PROGRAM

## 2024-02-29 PROCEDURE — 36430 TRANSFUSION BLD/BLD COMPNT: CPT

## 2024-02-29 PROCEDURE — 80048 BASIC METABOLIC PNL TOTAL CA: CPT | Performed by: PHYSICIAN ASSISTANT

## 2024-02-29 PROCEDURE — 86920 COMPATIBILITY TEST SPIN: CPT | Performed by: PHYSICIAN ASSISTANT

## 2024-02-29 PROCEDURE — A4216 STERILE WATER/SALINE, 10 ML: HCPCS | Performed by: INTERNAL MEDICINE

## 2024-02-29 PROCEDURE — 99900035 HC TECH TIME PER 15 MIN (STAT)

## 2024-02-29 PROCEDURE — 97606 NEG PRS WND THER DME>50 SQCM: CPT

## 2024-02-29 PROCEDURE — 25000003 PHARM REV CODE 250: Performed by: STUDENT IN AN ORGANIZED HEALTH CARE EDUCATION/TRAINING PROGRAM

## 2024-02-29 RX ORDER — MICONAZOLE NITRATE 2 %
POWDER (GRAM) TOPICAL 2 TIMES DAILY
Status: DISCONTINUED | OUTPATIENT
Start: 2024-02-29 | End: 2024-03-14

## 2024-02-29 RX ORDER — HYDROCODONE BITARTRATE AND ACETAMINOPHEN 500; 5 MG/1; MG/1
TABLET ORAL
Status: DISCONTINUED | OUTPATIENT
Start: 2024-02-29 | End: 2024-04-09 | Stop reason: HOSPADM

## 2024-02-29 RX ORDER — INSULIN ASPART 100 [IU]/ML
5 INJECTION, SOLUTION INTRAVENOUS; SUBCUTANEOUS
Status: DISCONTINUED | OUTPATIENT
Start: 2024-02-29 | End: 2024-03-03

## 2024-02-29 RX ADMIN — MICONAZOLE NITRATE 2 % TOPICAL POWDER: at 09:02

## 2024-02-29 RX ADMIN — SODIUM CHLORIDE: 9 INJECTION, SOLUTION INTRAVENOUS at 09:02

## 2024-02-29 RX ADMIN — HYDROCODONE BITARTRATE AND ACETAMINOPHEN 1 TABLET: 5; 325 TABLET ORAL at 11:02

## 2024-02-29 RX ADMIN — ACETAMINOPHEN 650 MG: 325 TABLET ORAL at 09:02

## 2024-02-29 RX ADMIN — HYDROCODONE BITARTRATE AND ACETAMINOPHEN 1 TABLET: 5; 325 TABLET ORAL at 01:02

## 2024-02-29 RX ADMIN — SODIUM CHLORIDE, PRESERVATIVE FREE 10 ML: 5 INJECTION INTRAVENOUS at 05:02

## 2024-02-29 RX ADMIN — OXYCODONE HYDROCHLORIDE AND ACETAMINOPHEN 500 MG: 500 TABLET ORAL at 09:02

## 2024-02-29 RX ADMIN — TOBRAMYCIN 540 MG: 40 INJECTION INTRAMUSCULAR; INTRAVENOUS at 03:02

## 2024-02-29 RX ADMIN — INSULIN ASPART 10 UNITS: 100 INJECTION, SOLUTION INTRAVENOUS; SUBCUTANEOUS at 06:02

## 2024-02-29 RX ADMIN — INSULIN ASPART 5 UNITS: 100 INJECTION, SOLUTION INTRAVENOUS; SUBCUTANEOUS at 04:02

## 2024-02-29 RX ADMIN — FERROUS SULFATE TAB 325 MG (65 MG ELEMENTAL FE) 1 EACH: 325 (65 FE) TAB at 09:02

## 2024-02-29 RX ADMIN — HYDROCODONE BITARTRATE AND ACETAMINOPHEN 1 TABLET: 5; 325 TABLET ORAL at 09:02

## 2024-02-29 RX ADMIN — MEROPENEM 1 G: 1 INJECTION, POWDER, FOR SOLUTION INTRAVENOUS at 04:02

## 2024-02-29 RX ADMIN — MEROPENEM 1 G: 1 INJECTION, POWDER, FOR SOLUTION INTRAVENOUS at 01:02

## 2024-02-29 RX ADMIN — INSULIN ASPART 10 UNITS: 100 INJECTION, SOLUTION INTRAVENOUS; SUBCUTANEOUS at 01:02

## 2024-02-29 RX ADMIN — MUPIROCIN: 20 OINTMENT TOPICAL at 09:02

## 2024-02-29 RX ADMIN — SODIUM CHLORIDE: 9 INJECTION, SOLUTION INTRAVENOUS at 03:02

## 2024-02-29 RX ADMIN — SODIUM CHLORIDE, PRESERVATIVE FREE 10 ML: 5 INJECTION INTRAVENOUS at 04:02

## 2024-02-29 RX ADMIN — INSULIN ASPART 8 UNITS: 100 INJECTION, SOLUTION INTRAVENOUS; SUBCUTANEOUS at 04:02

## 2024-02-29 RX ADMIN — CETIRIZINE HYDROCHLORIDE 10 MG: 10 TABLET, FILM COATED ORAL at 09:02

## 2024-02-29 RX ADMIN — PANTOPRAZOLE SODIUM 40 MG: 40 TABLET, DELAYED RELEASE ORAL at 09:02

## 2024-02-29 RX ADMIN — SITAGLIPTIN 100 MG: 50 TABLET, FILM COATED ORAL at 09:02

## 2024-02-29 RX ADMIN — SODIUM CHLORIDE, PRESERVATIVE FREE 10 ML: 5 INJECTION INTRAVENOUS at 01:02

## 2024-02-29 RX ADMIN — SODIUM CHLORIDE: 9 INJECTION, SOLUTION INTRAVENOUS at 04:02

## 2024-02-29 RX ADMIN — INSULIN DETEMIR 10 UNITS: 100 INJECTION, SOLUTION SUBCUTANEOUS at 09:02

## 2024-02-29 RX ADMIN — MEROPENEM 1 G: 1 INJECTION, POWDER, FOR SOLUTION INTRAVENOUS at 09:02

## 2024-02-29 RX ADMIN — INSULIN ASPART 5 UNITS: 100 INJECTION, SOLUTION INTRAVENOUS; SUBCUTANEOUS at 01:02

## 2024-02-29 NOTE — PROGRESS NOTES
[facility]  [facility] [place] MEDICINE - H&P ADMISSION NOTE    Patient Name: Antonio Galvan II  MRN: [id number]  Patient Class: IP- Swing   Admission Date: 2/26/2024   Admitting Physician:  Service   Attending Physician: ANTHONY Vazquez  Primary Care Provider: Jennifer Fernando NP  Face-to-Face encounter date: 02/29/2024      CHIEF COMPLAINT   No chief complaint on file.    HISTORY OF PRESENTING ILLNESS     This 58-year-old man with quadriplegic spinal paralysis and numerous decubitus ulcers who is followed by wound care is brought in today due to fever. He had wound cultures done 3 days ago that have grown Proteus mirabilis and Klebsiella pneumoniae. Sensitivities are not complete. The wounds were noted to have significant odor and heavy green drainage. In addition the patient had some nausea and vomiting and home health noted that his blood pressure was dipping. He was advised to come to the emergency room. Wound cultures were drawn from the right hip wound. His wife reports that when the wound is pressed above pus comes out. Both Klebsiella and Proteus maybe treated with fluoroquinolones which are both IV and oral. The patient's wife preferred if the patient came home so that she could attend to his wounds however the patient expressed a desire to be admitted.     We will admit for IV abx and wound care and a consult to Dr. Jean when he is on campus for possible debridment.     No reported complaints today.  Levaquin was discontinued and cefepime initiated based off of culture sensitivity report.  Patient transitioned to our swing unit.    Today:  No reported complaints today. Patient request to discontinue Benadryl qhs. Increasing insulin regimen by adding Aspart 5u TIDWM for better glucose control. Wound care changed dressing today, with bleeding noted. H&H improved after transfusion yesterday, but with bleeding from wound, we will give an additional unit of pRBC today.     PAST  MEDICAL HISTORY     Past Medical History:   Diagnosis Date    A-fib     Cardiac arrest     7/2022    Chronic skin ulcer     DM (diabetes mellitus)     Infected decubitus ulcer     Lymphedema of leg     Neurogenic bladder     Obesity, unspecified     Pressure ulcer of heel     Pressure ulcer of right foot, stage 3     Pressure ulcer of right ischium     Quadriplegia     Quadriplegic spinal paralysis      PAST SURGICAL HISTORY     Past Surgical History:   Procedure Laterality Date    APPLICATION OF WOUND VACUUM-ASSISTED CLOSURE DEVICE Right 5/12/2023    Procedure: APPLICATION, WOUND VAC;  Surgeon: Keyonna Jean MD;  Location: [facility] OR;  Service: General;  Laterality: Right;    DEBRIDEMENT OF BUTTOCKS Right 5/12/2023    Procedure: DEBRIDEMENT, BUTTOCK;  Surgeon: Keyonna Jean MD;  Location: [facility] OR;  Service: General;  Laterality: Right;    INCISION AND DRAINAGE HIP Bilateral 8/16/2023    Procedure: INCISION AND DRAINAGE, HIP;  Surgeon: Ryan Tirado MD;  Location: [facility] OR;  Service: General;  Laterality: Bilateral;  1. Excisional debridement skin to bone, skin to bone with biopsy and debridement of hard bone at the Left hip necrotic wound 11 cm long 5-1/2 cm wide 4-1/2 cm deep upon completion of debridement   2. Excisional debridement skin to muscle right hip with debridement    PRESSURE ULCER DEBRIDEMENT N/A 2/27/2024    Procedure: DEBRIDEMENT, PRESSURE ULCER;  Surgeon: Keyonna Jean MD;  Location: [facility];  Service: General;  Laterality: N/A;     FAMILY HISTORY   Reviewed and noncontributory to this case    SOCIAL HISTORY     Social History     Socioeconomic History    Marital status: Single   Tobacco Use    Smoking status: Never    Smokeless tobacco: Never   Substance and Sexual Activity    Alcohol use: Never    Drug use: Never    Sexual activity: Not Currently     Social Determinants of Health     Financial Resource Strain: Low Risk  (2/27/2024)    Overall Financial Resource Strain (CARDIA)      Difficulty of Paying Living Expenses: Not hard at all   Food Insecurity: No Food Insecurity (2/27/2024)    Hunger Vital Sign     Worried About Running Out of Food in the Last Year: Never true     Ran Out of Food in the Last Year: Never true   Transportation Needs: No Transportation Needs (2/27/2024)    PRAPARE - Transportation     Lack of Transportation (Medical): No     Lack of Transportation (Non-Medical): No   Physical Activity: Inactive (2/27/2024)    Exercise Vital Sign     Days of Exercise per Week: 0 days     Minutes of Exercise per Session: 0 min   Stress: No Stress Concern Present (2/27/2024)    [employer] [place] [employer] - Occupational Stress Questionnaire     Feeling of Stress : Only a little   Recent Concern: Stress - Stress Concern Present (2/24/2024)    [employer] [place] [employer] - Occupational Stress Questionnaire     Feeling of Stress : To some extent   Social Connections: Moderately Integrated (2/27/2024)    Social Connection and Isolation Panel [NHANES]     Frequency of Communication with Friends and Family: More than three times a week     Frequency of Social Gatherings with Friends and Family: More than three times a week     Attends Sikh Services: More than 4 times per year     Active Member of Clubs or Organizations: Yes     Attends Club or Organization Meetings: 1 to 4 times per year     Marital Status: Never    Housing Stability: Low Risk  (2/27/2024)    Housing Stability Vital Sign     Unable to Pay for Housing in the Last Year: No     Number of Places Lived in the Last Year: 1     Unstable Housing in the Last Year: No     HOME MEDICATIONS     Prior to Admission medications    Medication Sig Start Date End Date Taking? Authorizing Provider   albuterol (PROVENTIL) 2.5 mg /3 mL (0.083 %) nebulizer solution Inhale 2.5 mg into the lungs. 12/8/21 9/28/23  Provider, Historical   ALKALOL NASAL WASH Soln 50 mLs by Nasal route once daily. 8/15/22    Provider, Historical   ascorbic acid, vitamin C, (VITAMIN C) 500 MG tablet Take 1 tablet (500 mg total) by mouth once daily. 9/28/23   Jeanette Mann MD   aspirin (ECOTRIN) 81 MG EC tablet Take 1 tablet (81 mg total) by mouth once daily. 9/28/23 9/27/24  Jeanette Mann MD   bisacodyL (DULCOLAX) 10 mg Supp Place 1 suppository (10 mg total) rectally daily as needed (Until bowel movement if patient has no bowel movement for 2 days). 9/27/23   Jeanette Mann MD   budesonide (PULMICORT) 0.5 mg/2 mL nebulizer solution Take 2 mLs (0.5 mg total) by nebulization daily as needed (Wheezing, SOB). Controller 9/27/23 9/26/24  Jeanette Mann MD   carvediloL (COREG) 12.5 MG tablet Take 1 tablet (12.5 mg total) by mouth 2 (two) times daily with meals. 9/27/23 9/26/24  Jeanette Mann MD   collagenase (SANTYL) ointment Apply topically once daily.  Patient not taking: Reported on 9/28/2023 5/25/23   Syl Avila MD   doxycycline (VIBRAMYCIN) 100 MG Cap Take 1 capsule (100 mg total) by mouth 2 (two) times daily. for 10 days 2/20/24 3/1/24  Ronald Barcenas MD   ferrous sulfate 325 (65 FE) MG EC tablet Take 1 tablet (325 mg total) by mouth once daily. 9/27/23   Jeanette Mann MD   insulin detemir U-100 (LEVEMIR) 100 unit/mL injection Inject 7 Units into the skin every evening.  Patient not taking: Reported on 9/28/2023 9/27/23 9/26/24  Jeanette Mann MD   JANUVIA 50 mg Tab Take 100 mg by mouth once daily. 5/4/22   Provider, Historical   polyethylene glycol (GLYCOLAX) 17 gram PwPk Take 17 g by mouth 3 (three) times daily as needed (nausea vomiting).  Patient not taking: Reported on 9/28/2023 9/27/23   Jeanette Mann MD     ALLERGIES   Patient has no known allergies.    REVIEW OF SYSTEMS   Except as documented above, all other systems reviewed and negative    PHYSICAL EXAM     Vitals:    02/29/24 0944   BP:    Pulse:    Resp: 18   Temp:         General:  In no acute distress, resting  comfortably  Head and neck:  Atraumatic, normocephalic, moist mucous membranes, supple neck  Chest:  Clear to auscultation bilaterally  Heart:  S1, S2, no appreciable murmur  Abdomen:  Soft, nontender, BS +  MSK:  Warm, no lower extremity edema, no clubbing or cyanosis  Neuro:  Alert and oriented x4, moving all extremities with good strength  Integumentary:  No obvious skin rash  Psychiatry:  Appropriate mood and affect    ASSESSMENT AND PLAN     Multiple infected wounds to sacrum, abdomen, feet  Klebsiella pneumoniae, Proteus mirabilis, Pseudomonas aeruginosa  Discontinue Levaquin based off of sensitivity report (02/23/2024-02/26/2024)  Transitioned to cefepime on 02/26/2024  Cefepime discontinued on 02/27/2024 continued hyperglycemia as it is mixed with D5W  Pharmacy recommended to begin Meerem 1g Q8 on 02/27/2024 x 13 days  Tobramycin per pharmacy dosing 02/27/2024 x14 days   Blood cultures remain negative at this time, tissue cultures from debridement pending sensitivity report  S/p debridement with Dr. Jean on 02/27/2024    Post op anemia   H&H improved s/p 2u pRBC 02/28/2024   Bleeding noted to wound, will give an addition 1u pRBC today   No anticoagulation at this time - aspirin d/c 02/28/2024     Hyperglycemia in setting of DM  A1c 02/23/2024 8.5%  Reports taking 80u Levemir BID  Continue home Sitagliptin  Continue detemir 10u q.a.m. and moderate dose SSI and titrate as needed  Adding Aspart 5u TIDWM     Hypotension - improved      Hx of: Neurogenic bladder, Quadriplegia     DVT prophylaxis:  SCDs, history of bleeding in the recent past  __________________________________________________________________  LABS/MICRO/MEDS/DIAGNOSTICS     LABS  Recent Labs     02/28/24  0454 02/29/24  0519   * 136   K 5.1 4.5   CHLORIDE 101 106   CO2 19* 19*   BUN 23.7 20.4   CREATININE 1.20* 1.01   GLUCOSE 334* 359*   CALCIUM 8.1* 8.0*   ALKPHOS 113  --    AST 7  --    ALT 14  --    ALBUMIN 1.6*  --      Recent  Labs     02/29/24  0519   WBC 7.52   RBC 3.26*   HCT 27.7*   MCV 85.0        MICROBIOLOGY  Microbiology Results (last 7 days)       ** No results found for the last 168 hours. **          MEDICATIONS   acetaminophen  650 mg Oral QHS    ascorbic acid (vitamin C)  500 mg Oral Daily    cetirizine  10 mg Oral Daily    collagenase   Topical (Top) Daily    diphenhydrAMINE  25 mg Oral QHS    ferrous sulfate  1 tablet Oral Daily    insulin aspart U-100  5 Units Subcutaneous TIDWM    insulin detemir U-100  10 Units Subcutaneous Daily    meropenem (MERREM) IVPB  1 g Intravenous Q8H    mupirocin   Nasal BID    pantoprazole  40 mg Oral Daily    SITagliptin phosphate  100 mg Oral Daily    sodium chloride 0.9%  500 mL Intravenous Once    sodium chloride 0.9%  10 mL Intravenous Q6H    tobramycin IV (PEDS and ADULTS)  540 mg Intravenous Q24H      INFUSIONS    DIAGNOSTIC TESTS  X-Ray Chest 1 View   Final Result      Left PICC tip overlies the mid SVC.         Electronically signed by: Oj Solomon   Date:    02/28/2024   Time:    14:29         Patient information was obtained from patient, patient's family, past medical records and ER records.   All diagnosis and differential diagnosis have been reviewed; assessment and plan has been documented. I have personally reviewed the labs and test results that are presently available; I have reviewed the patients medication list. I have reviewed the consulting providers response and recommendations. I have reviewed or attempted to review medical records based upon their availability.  All of the patient's questions have been addressed and answered. Patient's is agreeable to the above stated plan. I will continue to monitor closely and make adjustments to medical management as needed.  This note was created using [employer] voice recognition software that occasionally misinterpreted phrases or words.  Please contact me if any questions may rise regarding documentation to clarify verbiage.       ANTHONY Vazquez   Internal Medicine  Department of Hospital Medicine [facility] - [facility]

## 2024-02-29 NOTE — PLAN OF CARE
Problem: Adult Inpatient Plan of Care  Goal: Plan of Care Review  Outcome: Ongoing, Progressing  Flowsheets (Taken 2/28/2024 2302)  Plan of Care Reviewed With: patient  Goal: Patient-Specific Goal (Individualized)  Outcome: Ongoing, Progressing  Flowsheets (Taken 2/28/2024 2302)  Anxieties, Fears or Concerns: none verbalized at this time  Individualized Care Needs: monitor wound for bleeding, IV ABX, monitor vital signs, blood glucose monitoring, and H/H  Goal: Absence of Hospital-Acquired Illness or Injury  Outcome: Ongoing, Progressing  Intervention: Identify and Manage Fall Risk  Flowsheets (Taken 2/28/2024 2302)  Safety Promotion/Fall Prevention:   assistive device/personal item within reach   bed alarm set   side rails raised x 3  Intervention: Prevent Skin Injury  Flowsheets (Taken 2/28/2024 2302)  Body Position:   position maintained   heels elevated   upper extremity elevated  Skin Protection:   adhesive use limited   incontinence pads utilized   tubing/devices free from skin contact  Intervention: Prevent and Manage VTE (Venous Thromboembolism) Risk  Flowsheets (Taken 2/28/2024 2302)  Activity Management: Rolling - L1  VTE Prevention/Management:   bleeding precations maintained   ROM (passive) performed  Range of Motion: ROM (range of motion) performed  Intervention: Prevent Infection  Flowsheets (Taken 2/28/2024 2000)  Infection Prevention:   cohorting utilized   environmental surveillance performed   rest/sleep promoted   single patient room provided  Goal: Optimal Comfort and Wellbeing  Outcome: Ongoing, Progressing  Intervention: Monitor Pain and Promote Comfort  Flowsheets (Taken 2/28/2024 2302)  Pain Management Interventions:   care clustered   medication offered   pain management plan reviewed with patient/caregiver   pillow support provided   position adjusted   quiet environment facilitated   relaxation techniques promoted  Intervention: Provide Person-Centered Care  Flowsheets (Taken 2/28/2024  2302)  Trust Relationship/Rapport:   care explained   choices provided   emotional support provided   empathic listening provided   questions answered   questions encouraged   reassurance provided   thoughts/feelings acknowledged  Goal: Readiness for Transition of Care  Outcome: Ongoing, Progressing     Problem: Diabetes Comorbidity  Goal: Blood Glucose Level Within Targeted Range  Outcome: Ongoing, Progressing  Intervention: Monitor and Manage Glycemia  Flowsheets (Taken 2/28/2024 2302)  Glycemic Management: blood glucose monitored     Problem: Skin Injury Risk Increased  Goal: Skin Health and Integrity  Outcome: Ongoing, Progressing  Intervention: Optimize Skin Protection  Flowsheets (Taken 2/28/2024 2302)  Pressure Reduction Techniques:   heels elevated off bed   pressure points protected  Pressure Reduction Devices: specialty bed utilized  Skin Protection:   adhesive use limited   incontinence pads utilized   tubing/devices free from skin contact  Head of Bed (HOB) Positioning: HOB at 20-30 degrees  Intervention: Promote and Optimize Oral Intake  Flowsheets (Taken 2/28/2024 2302)  Oral Nutrition Promotion:   calorie-dense foods provided   calorie-dense liquids provided     Problem: Impaired Wound Healing  Goal: Optimal Wound Healing  Outcome: Ongoing, Progressing  Intervention: Promote Wound Healing  Flowsheets (Taken 2/28/2024 2302)  Oral Nutrition Promotion:   calorie-dense foods provided   calorie-dense liquids provided  Activity Management: Rolling - L1  Pain Management Interventions:   care clustered   medication offered   pain management plan reviewed with patient/caregiver   pillow support provided   position adjusted   quiet environment facilitated   relaxation techniques promoted     Problem: Fall Injury Risk  Goal: Absence of Fall and Fall-Related Injury  Outcome: Ongoing, Progressing     Problem: Infection  Goal: Absence of Infection Signs and Symptoms  Outcome: Ongoing, Progressing

## 2024-02-29 NOTE — PROGRESS NOTES
Pt premedicated for wound care by Rhea MADDOX.  Dr. Reyes present for dressing change.   Irrigated undermining area on R lateral buttock ulceration  with approximately 400ml of normal saline to loosen Quikclot packing.   Able to remove with minimal bleeding and achieve hemostasis with 10 min pressure.   Connective tissue visualized, bone exposed in area.  Unable to visualize entire open area, able to palpate exposed bone.  To reduce risk of bleeding with packing removal daily, new orders obtained for Aquacel ag packing lightly to undermining and continued use of collagen, gauze, abd, medfix to flattened base daily.   Recommend irrigating undermining to minimize bleeding with packing removal each dressing change.   Initiated NPWT with 125mmHg continuous suction to sacral ulceration.   Packed with black granufoam, periwound protected with hydrocolloid, mastisol utilized for additional adhesive.   Vac dressing changes to be performed twice weekly.  Orders placed.  Discussed plan with pt and caregiver at bedside.  Verbalized understanding.     02/29/24 1227   WOCN Assessment   WOCN Total Time (mins) 100   Visit Date 02/29/24   WOCN Speciality Wound   Procedure wound vac   Intervention applied;orders        Altered Skin Integrity 01/12/23 1530 Sacral spine Full thickness tissue loss with exposed bone, tendon, or muscle. Often includes undermining and tunneling. May extend into muscle and/or supporting structures.   Date First Assessed/Time First Assessed: 01/12/23 1530   Altered Skin Integrity Present on Admission - Did Patient arrive to the hospital with altered skin?: yes  Location: Sacral spine  Description of Altered Skin Integrity: Full thickness tissue los...   Dressing Appearance Intact;Moist drainage   Drainage Amount Moderate   Drainage Characteristics/Odor Serosanguineous;No odor;Bleeding controlled   Appearance Tan;White;Yellow;Black;Red;Moist;Granulating;Other (see comments);Fibrin  (connective  tissue/cauterization)   Tissue loss description Full thickness   Periwound Area Excoriated;Denuded   Wound Edges Defined   Care Cleansed with:;Wound cleanser;Antimicrobial agent   Dressing Applied;Other (comment)  (npwt black granufoam/drape)   Periwound Care Other (see comments);Hydrocolloid barrier applied  (mastisol adhesive)   Dressing Change Due 03/04/24        Incision/Site 02/27/24 1450 Right Buttocks lateral   Date First Assessed/Time First Assessed: 02/27/24 1450   Side: Right  Location: Buttocks  Orientation: lateral  Additional Comments: mesalt, quick clot, gauze, abdomen pad, and tape   Wound Image    Dressing Appearance Intact;Moist drainage   Drainage Amount Large   Drainage Characteristics/Odor Serosanguineous;No odor   Appearance Pink;Red;Granulating;Tan;Gray;White;Fibrin;Slough;Muscle;Bone;Other (see comments)  (connective tissue)   Periwound Area Moist   Wound Edges Irregular   Undermining (depth (cm)/location) 12 to 2 o'clock / 6cm at 1 o'clock   Care Cleansed with:;Sterile normal saline;Other (see comments)  (irrigate with NS)   Dressing Applied;Collagen;Gauze;Absorptive Pad  (to flattened area, secured with medfix tape)   Packing packed with;hydrofiber/alginate  (in undermining area only (Lightly))   Periwound Care Skin barrier film applied;Dry periwound area maintained   Dressing Change Due 03/01/24        Negative Pressure Wound Therapy  02/29/24 1227   Placement Date/Time: 02/29/24 1227   Location: Sacral Spine   NPWT Type Vacuum Therapy   Therapy Setting NPWT Continuous therapy   Pressure Setting NPWT 125 mmHg   Therapy Interventions NPWT Dressing changed;Seal intact  (initiated NPWT today)

## 2024-02-29 NOTE — PLAN OF CARE
Problem: Adult Inpatient Plan of Care  Goal: Plan of Care Review  Outcome: Ongoing, Progressing  Flowsheets (Taken 2/29/2024 1455)  Plan of Care Reviewed With:   patient   family  Goal: Patient-Specific Goal (Individualized)  Outcome: Ongoing, Progressing  Flowsheets (Taken 2/29/2024 1455)  Anxieties, Fears or Concerns: None verbalized at this time  Individualized Care Needs:   Monitor wound for bleeding   IV ABX   Monitor vital signs, blood glucose monitoring, and H/H  Goal: Absence of Hospital-Acquired Illness or Injury  Outcome: Ongoing, Progressing  Goal: Optimal Comfort and Wellbeing  Outcome: Ongoing, Progressing  Goal: Readiness for Transition of Care  Outcome: Ongoing, Progressing     Problem: Diabetes Comorbidity  Goal: Blood Glucose Level Within Targeted Range  Outcome: Ongoing, Progressing     Problem: Skin Injury Risk Increased  Goal: Skin Health and Integrity  Outcome: Ongoing, Progressing     Problem: Impaired Wound Healing  Goal: Optimal Wound Healing  Outcome: Ongoing, Progressing     Problem: Fall Injury Risk  Goal: Absence of Fall and Fall-Related Injury  Outcome: Ongoing, Progressing     Problem: Infection  Goal: Absence of Infection Signs and Symptoms  Outcome: Ongoing, Progressing

## 2024-02-29 NOTE — PROGRESS NOTES
Inpatient Nutrition Evaluation    Admit Date: 2/26/2024   Total duration of encounter: 3 days   Patient Age: 58 y.o.    Nutrition Recommendation/Prescription     Continue regular diet. 2. Add Arginaid 1 pk BID for wound healing 3. Continue ascorbic acid 500 mg PO daily 4. Weekly weights 5. Monitor intake, wt, labs, medications.     Nutrition Assessment     Chart Review    Reason Seen: continuous nutrition monitoring, length of stay, and follow-up    Malnutrition Screening Tool Results   Have you recently lost weight without trying?: Yes: 34 lbs or more  Have you been eating poorly because of a decreased appetite?: Yes   MST Score: 5   Diagnosis:  Infected wounds/sacrum, abd, feet  Klebsiella pneumoniae, Pseudomonas aeruginosa, Proteus mirabilis  DM,   Hypotension  Hyperglycemia    Relevant Medical History:   A-fib        Cardiac arrest       7/2022    Chronic skin ulcer      DM (diabetes mellitus)      Infected decubitus ulcer      Lymphedema of leg      Neurogenic bladder      Obesity, unspecified      Pressure ulcer of heel      Pressure ulcer of right foot, stage 3      Pressure ulcer of right ischium      Quadriplegia      Quadriplegic spinal paralysis          Scheduled Medications:  acetaminophen, 650 mg, QHS  ascorbic acid (vitamin C), 500 mg, Daily  cetirizine, 10 mg, Daily  collagenase, , Daily  diphenhydrAMINE, 25 mg, QHS  ferrous sulfate, 1 tablet, Daily  insulin aspart U-100, 5 Units, TIDWM  insulin detemir U-100, 10 Units, Daily  meropenem (MERREM) IVPB, 1 g, Q8H  mupirocin, , BID  pantoprazole, 40 mg, Daily  SITagliptin phosphate, 100 mg, Daily  sodium chloride 0.9%, 500 mL, Once  sodium chloride 0.9%, 10 mL, Q6H  tobramycin IV (PEDS and ADULTS), 540 mg, Q24H    Continuous Infusions:   PRN Medications: 0.9%  NaCl infusion (for blood administration), sodium chloride 0.9%, acetaminophen, albuterol, benzonatate, bisacodyL, budesonide, calcium carbonate, cloNIDine, dextrose 10%, dextrose 10%,  diphenhydrAMINE, glucagon (human recombinant), glucose, glucose, hydrALAZINE, HYDROcodone-acetaminophen, hydrOXYzine pamoate, insulin aspart U-100, loperamide, melatonin, metoprolol, ondansetron, simethicone, Flushing PICC/Midline Protocol **AND** sodium chloride 0.9% **AND** sodium chloride 0.9%, Pharmacy to dose Aminoglycosides consult **AND** tobramycin - pharmacy to dose, zolpidem    Recent Labs   Lab 02/23/24  1215 02/23/24  1445 02/23/24  1748 02/25/24  0526 02/28/24  0454 02/29/24  0519   NA  --  133*  --  134* 128* 136   K  --  5.3*  --  4.9 5.1 4.5   CALCIUM  --  9.6  --  9.1 8.1* 8.0*   PHOS  --   --   --  3.4  --   --    MG  --   --   --  1.80  --   --    CHLORIDE  --  104  --  105 101 106   CO2  --  18*  --  19* 19* 19*   BUN  --  26.4*  --  27.4* 23.7 20.4   CREATININE  --  1.11  --  1.00 1.20* 1.01   EGFRNORACEVR  --  >60  --  >60 >60 >60   GLUCOSE  --  327*  --  213* 334* 359*   BILITOT  --  0.3  --  0.2 0.2  --    ALKPHOS  --  105  --  106 113  --    ALT  --  16  --  15 14  --    AST  --  8  --  10 7  --    ALBUMIN  --  2.4*  --  2.0* 1.6*  --    PREALB 14.3*  --   --   --  7.4*  --    HGBA1C  --   --  8.5*  --   --   --    WBC  --  17.04*  --  10.65 8.50 7.52   HGB  --  10.3*  --  8.4* 6.6* 8.9*   HCT  --  33.3*  --  27.0* 22.7* 27.7*     Nutrition Orders:  Diet Adult Regular      Appetite/Oral Intake: good/% of meals  Factors Affecting Nutritional Intake:  multiple wounds to sacrum, feet  Food/Religion/Cultural Preferences:     Food Allergies: none reported  Last Bowel Movement: 02/28/24  Wound(s):     Altered Skin Integrity 01/12/23 1530 Sacral spine Full thickness tissue loss with exposed bone, tendon, or muscle. Often includes undermining and tunneling. May extend into muscle and/or supporting structures.-Tissue loss description: Full thickness     Comments    Pt reports intake is good. See wounds section. Discussed trying arginaid for wound healing. Pt done debridement this week. Pt has  "exposed bone, tendon,  or muslce to sacral spine. Pt willing to try supplement. Recs to MD. Pt blood glucose is 359mg/dL. Will adjust sugar free.     Anthropometrics    Height: 5' 7" (170.2 cm), Height Method: Stated  Last Weight: 93.9 kg (207 lb) (02/26/24 1500), Weight Method: Bed Scale (Used bed weight from admission 2/23/24 as his bed does not have a scale)  BMI (Calculated): 32.4  BMI Classification: obese grade I (BMI 30-34.9)        Ideal Body Weight (IBW), Male: 148 lb     % Ideal Body Weight, Male (lb): 139.86 %                          Usual Weight Provided By: unable to obtain usual weight    Wt Readings from Last 5 Encounters:   02/26/24 93.9 kg (207 lb)   02/27/24 93.9 kg (207 lb 0.2 oz)   02/23/24 93.9 kg (207 lb)   08/16/23 106.4 kg (234 lb 9.6 oz)   08/15/23 113.4 kg (250 lb)     Weight Change(s) Since Admission:   Wt Readings from Last 1 Encounters:   02/26/24 1500 93.9 kg (207 lb)   Admit Weight: 93.9 kg (207 lb) (02/26/24 1500), Weight Method: Bed Scale (Used bed weight from admission 2/23/24 as his bed does not have a scale)    Patient Education     Not applicable.    Nutrition Goals & Monitoring     Dietitian will monitor: food and beverage intake, weight, weight change, electrolyte/renal panel, glucose/endocrine profile, and gastrointestinal profile    Nutrition Risk/Follow-Up: low (follow-up in 5-7 days)  Patients assigned 'low nutrition risk' status do not qualify for a full nutritional assessment but will be monitored and re-evaluated in a 5-7 day time period. Please consult if re-evaluation needed sooner.  "

## 2024-02-29 NOTE — PLAN OF CARE
Problem: Adult Inpatient Plan of Care  Goal: Plan of Care Review  Outcome: Ongoing, Progressing  Flowsheets (Taken 2/28/2024 1000)  Plan of Care Reviewed With:   patient   caregiver   family  Goal: Patient-Specific Goal (Individualized)  Outcome: Ongoing, Progressing  Flowsheets (Taken 2/28/2024 1000)  Anxieties, Fears or Concerns: None at this time  Individualized Care Needs: Monitor wound for bleeding, IV ABX, Monitor vital signs, blood glucose, and H/H  Goal: Absence of Hospital-Acquired Illness or Injury  Outcome: Ongoing, Progressing  Intervention: Identify and Manage Fall Risk  Flowsheets (Taken 2/28/2024 1000)  Safety Promotion/Fall Prevention: assistive device/personal item within reach  Intervention: Prevent Skin Injury  Flowsheets (Taken 2/28/2024 1000)  Body Position: position changed independently  Skin Protection:   adhesive use limited   incontinence pads utilized   tubing/devices free from skin contact  Intervention: Prevent and Manage VTE (Venous Thromboembolism) Risk  Flowsheets (Taken 2/28/2024 2022)  Activity Management: Rolling - L1  VTE Prevention/Management:   bleeding precations maintained   bleeding risk assessed  Intervention: Prevent Infection  Flowsheets (Taken 2/28/2024 1000)  Infection Prevention:   cohorting utilized   environmental surveillance performed   equipment surfaces disinfected   hand hygiene promoted   personal protective equipment utilized   rest/sleep promoted   single patient room provided  Goal: Optimal Comfort and Wellbeing  Outcome: Ongoing, Progressing  Goal: Readiness for Transition of Care  Outcome: Ongoing, Progressing     Problem: Diabetes Comorbidity  Goal: Blood Glucose Level Within Targeted Range  Outcome: Ongoing, Progressing     Problem: Skin Injury Risk Increased  Goal: Skin Health and Integrity  Outcome: Ongoing, Progressing     Problem: Impaired Wound Healing  Goal: Optimal Wound Healing  Outcome: Ongoing, Progressing     Problem: Fall Injury Risk  Goal:  Absence of Fall and Fall-Related Injury  Outcome: Ongoing, Progressing     Problem: Infection  Goal: Absence of Infection Signs and Symptoms  Outcome: Ongoing, Progressing

## 2024-02-29 NOTE — PT/OT/SLP PROGRESS
Name: Antonio Galvan II    : 1965 (58 y.o.)  MRN: [id number]           Patient Unavailable      Patient unable to be seen at this time secondary to: Nursing still concerned regarding would bleeding and friction with movement. PT awaiting placement of wond vac or ok from wound care before ROM is completed. PT to continue following up.

## 2024-02-29 NOTE — PLAN OF CARE
Weekly Staffing Report      Date Admitted: 2/26/2024 :   Staffing Date: 2/29/2024     Patient Active Problem List   Diagnosis    Uncontrolled type 2 diabetes mellitus with hyperglycemia    Atrial flutter    Constipation    Abnormal urinalysis    Obstructive sleep apnea syndrome    Acute deep vein thrombosis (DVT) of brachial vein of right upper extremity    Quadriplegia    Intolerance of continuous positive airway pressure (CPAP) ventilation    Complex sleep apnea syndrome    Open wound of left hip    Pressure injury of right ischium, stage 4    Osteomyelitis    Sacral decubitus ulcer, stage II    Chronic blood loss anemia    Infected pressure ulcer, unspecified pressure ulcer stage          Team Members Present:       Nursing Present     Yes [x]    No []    Physical Therapy Present    Yes []    No [x]    Occupational Therapy Present     Yes []    No [x]    Speech Therapy Present    Yes []    No []    NA [x]    Dietary Present    Yes [x]    No []        Physician Present     [] Thairy Reyes    [x] Jeanette Mann    [x] ANTHONY Howe    []       Family Present    [] Adult Children    [x] Spouse    [] POA    [] Friend/ Caregiver    [x] Other -mother       Interdisciplinary Meeting Notes:  PT/OT on hold due to surgery and receiving blood. Will evaluate when stable. Appetite-fair. Medically- debridement yesterday. Hemoglobin 6.6 will give 2 units of blood. Changed abx to [place] due to elevated blood sugars. Wound care to look at wound today. Plan to discharge home with home health and family care when ready. All questions answered at this time.                 Please see Documented PT/OT/ST, Dietary, Nursing, and Physician notes for detailed treatment information.

## 2024-03-01 LAB
ANION GAP SERPL CALC-SCNC: 9 MEQ/L
BACTERIA TISS AEROBE CULT: ABNORMAL
BASOPHILS # BLD AUTO: 0.03 X10(3)/MCL
BASOPHILS NFR BLD AUTO: 0.3 %
BUN SERPL-MCNC: 19 MG/DL (ref 8.4–25.7)
CALCIUM SERPL-MCNC: 8.3 MG/DL (ref 8.4–10.2)
CHLORIDE SERPL-SCNC: 104 MMOL/L (ref 98–107)
CO2 SERPL-SCNC: 20 MMOL/L (ref 22–29)
CREAT SERPL-MCNC: 0.87 MG/DL (ref 0.73–1.18)
CREAT/UREA NIT SERPL: 22
EOSINOPHIL # BLD AUTO: 0.37 X10(3)/MCL (ref 0–0.9)
EOSINOPHIL NFR BLD AUTO: 4.2 %
ERYTHROCYTE [DISTWIDTH] IN BLOOD BY AUTOMATED COUNT: 15.6 % (ref 11.5–17)
GFR SERPLBLD CREATININE-BSD FMLA CKD-EPI: >60 MLS/MIN/1.73/M2
GLUCOSE SERPL-MCNC: 275 MG/DL (ref 74–100)
HCT VFR BLD AUTO: 33.6 % (ref 42–52)
HGB BLD-MCNC: 10.7 G/DL (ref 14–18)
IMM GRANULOCYTES # BLD AUTO: 0.07 X10(3)/MCL (ref 0–0.04)
IMM GRANULOCYTES NFR BLD AUTO: 0.8 %
LYMPHOCYTES # BLD AUTO: 2.01 X10(3)/MCL (ref 0.6–4.6)
LYMPHOCYTES NFR BLD AUTO: 22.6 %
MCH RBC QN AUTO: 27.2 PG (ref 27–31)
MCHC RBC AUTO-ENTMCNC: 31.8 G/DL (ref 33–36)
MCV RBC AUTO: 85.3 FL (ref 80–94)
MONOCYTES # BLD AUTO: 0.47 X10(3)/MCL (ref 0.1–1.3)
MONOCYTES NFR BLD AUTO: 5.3 %
NEUTROPHILS # BLD AUTO: 5.95 X10(3)/MCL (ref 2.1–9.2)
NEUTROPHILS NFR BLD AUTO: 66.8 %
PLATELET # BLD AUTO: 298 X10(3)/MCL (ref 130–400)
PMV BLD AUTO: 11 FL (ref 7.4–10.4)
POCT GLUCOSE: 249 MG/DL (ref 70–110)
POCT GLUCOSE: 278 MG/DL (ref 70–110)
POCT GLUCOSE: 314 MG/DL (ref 70–110)
POCT GLUCOSE: 321 MG/DL (ref 70–110)
POTASSIUM SERPL-SCNC: 4.9 MMOL/L (ref 3.5–5.1)
RBC # BLD AUTO: 3.94 X10(6)/MCL (ref 4.7–6.1)
SODIUM SERPL-SCNC: 133 MMOL/L (ref 136–145)
WBC # SPEC AUTO: 8.9 X10(3)/MCL (ref 4.5–11.5)

## 2024-03-01 PROCEDURE — 80048 BASIC METABOLIC PNL TOTAL CA: CPT | Performed by: PHYSICIAN ASSISTANT

## 2024-03-01 PROCEDURE — A4216 STERILE WATER/SALINE, 10 ML: HCPCS | Performed by: INTERNAL MEDICINE

## 2024-03-01 PROCEDURE — 99900035 HC TECH TIME PER 15 MIN (STAT)

## 2024-03-01 PROCEDURE — 63600175 PHARM REV CODE 636 W HCPCS: Performed by: PHYSICIAN ASSISTANT

## 2024-03-01 PROCEDURE — 25000003 PHARM REV CODE 250: Performed by: PHYSICIAN ASSISTANT

## 2024-03-01 PROCEDURE — 85025 COMPLETE CBC W/AUTO DIFF WBC: CPT | Performed by: PHYSICIAN ASSISTANT

## 2024-03-01 PROCEDURE — 25000003 PHARM REV CODE 250: Performed by: INTERNAL MEDICINE

## 2024-03-01 PROCEDURE — 11000004 HC SNF PRIVATE

## 2024-03-01 PROCEDURE — 97162 PT EVAL MOD COMPLEX 30 MIN: CPT

## 2024-03-01 PROCEDURE — 94760 N-INVAS EAR/PLS OXIMETRY 1: CPT

## 2024-03-01 RX ADMIN — SODIUM CHLORIDE, PRESERVATIVE FREE 10 ML: 5 INJECTION INTRAVENOUS at 12:03

## 2024-03-01 RX ADMIN — INSULIN ASPART 5 UNITS: 100 INJECTION, SOLUTION INTRAVENOUS; SUBCUTANEOUS at 05:03

## 2024-03-01 RX ADMIN — Medication 6 MG: at 08:03

## 2024-03-01 RX ADMIN — CETIRIZINE HYDROCHLORIDE 10 MG: 10 TABLET, FILM COATED ORAL at 09:03

## 2024-03-01 RX ADMIN — INSULIN ASPART 5 UNITS: 100 INJECTION, SOLUTION INTRAVENOUS; SUBCUTANEOUS at 08:03

## 2024-03-01 RX ADMIN — SODIUM CHLORIDE, PRESERVATIVE FREE 10 ML: 5 INJECTION INTRAVENOUS at 06:03

## 2024-03-01 RX ADMIN — INSULIN ASPART 4 UNITS: 100 INJECTION, SOLUTION INTRAVENOUS; SUBCUTANEOUS at 09:03

## 2024-03-01 RX ADMIN — ACETAMINOPHEN 650 MG: 325 TABLET ORAL at 04:03

## 2024-03-01 RX ADMIN — PANTOPRAZOLE SODIUM 40 MG: 40 TABLET, DELAYED RELEASE ORAL at 09:03

## 2024-03-01 RX ADMIN — MICONAZOLE NITRATE 2 % TOPICAL POWDER: at 09:03

## 2024-03-01 RX ADMIN — HYDROCODONE BITARTRATE AND ACETAMINOPHEN 1 TABLET: 5; 325 TABLET ORAL at 10:03

## 2024-03-01 RX ADMIN — INSULIN ASPART 5 UNITS: 100 INJECTION, SOLUTION INTRAVENOUS; SUBCUTANEOUS at 11:03

## 2024-03-01 RX ADMIN — INSULIN ASPART 8 UNITS: 100 INJECTION, SOLUTION INTRAVENOUS; SUBCUTANEOUS at 05:03

## 2024-03-01 RX ADMIN — Medication 10 ML: at 08:03

## 2024-03-01 RX ADMIN — INSULIN DETEMIR 10 UNITS: 100 INJECTION, SOLUTION SUBCUTANEOUS at 10:03

## 2024-03-01 RX ADMIN — OXYCODONE HYDROCHLORIDE AND ACETAMINOPHEN 500 MG: 500 TABLET ORAL at 09:03

## 2024-03-01 RX ADMIN — HYDROCODONE BITARTRATE AND ACETAMINOPHEN 1 TABLET: 5; 325 TABLET ORAL at 03:03

## 2024-03-01 RX ADMIN — SITAGLIPTIN 100 MG: 50 TABLET, FILM COATED ORAL at 09:03

## 2024-03-01 RX ADMIN — ACETAMINOPHEN 650 MG: 325 TABLET ORAL at 08:03

## 2024-03-01 RX ADMIN — MEROPENEM 1 G: 1 INJECTION, POWDER, FOR SOLUTION INTRAVENOUS at 01:03

## 2024-03-01 RX ADMIN — FERROUS SULFATE TAB 325 MG (65 MG ELEMENTAL FE) 1 EACH: 325 (65 FE) TAB at 09:03

## 2024-03-01 RX ADMIN — SODIUM CHLORIDE, PRESERVATIVE FREE 10 ML: 5 INJECTION INTRAVENOUS at 05:03

## 2024-03-01 RX ADMIN — MEROPENEM 1 G: 1 INJECTION, POWDER, FOR SOLUTION INTRAVENOUS at 09:03

## 2024-03-01 RX ADMIN — LOPERAMIDE HYDROCHLORIDE 2 MG: 2 CAPSULE ORAL at 03:03

## 2024-03-01 RX ADMIN — MICONAZOLE NITRATE 2 % TOPICAL POWDER: at 08:03

## 2024-03-01 RX ADMIN — INSULIN ASPART 6 UNITS: 100 INJECTION, SOLUTION INTRAVENOUS; SUBCUTANEOUS at 12:03

## 2024-03-01 RX ADMIN — MEROPENEM 1 G: 1 INJECTION, POWDER, FOR SOLUTION INTRAVENOUS at 05:03

## 2024-03-01 NOTE — PLAN OF CARE
[facility] - Medical Surgical Unit  Discharge Reassessment    Primary Care Provider: Jennifer Fernando NP    Expected Discharge Date:     Reassessment (most recent)       Discharge Reassessment - 03/01/24 0812          Discharge Reassessment    Assessment Type Discharge Planning Reassessment     Did the patient's condition or plan change since previous assessment? No     Discharge Plan discussed with: Patient     Name(s) and Number(s) Judy mother [phone]     Communicated SADE with patient/caregiver Date not available/Unable to determine     Discharge Plan A Home Health;Home with family     DME Needed Upon Discharge  none     Transition of Care Barriers None     Why the patient remains in the hospital Requires continued medical care        Post-Acute Status    Post-Acute Authorization Home Health     Home Health Status Pending medical clearance/testing     Hospital Resources/Appts/Education Provided Provided patient/caregiver with written discharge plan information     Discharge Delays None known at this time

## 2024-03-01 NOTE — PT/OT/SLP EVAL
Physical Therapy Evaluation     Patient Name: Antonio Galvan II   MRN: [id number]  Recent Surgery: * No surgery found *      Recommendations:     Discharge Recommendations: Low Intensity Therapy   Discharge Equipment Recommendations: none   Barriers to discharge: Ongoing medical treatment    Assessment:     Antonio Galvan II is a 58 y.o. male admitted with a medical diagnosis of Infected pressure ulcer, unspecified pressure ulcer stage. He presents with the following impairments/functional limitations: impaired functional mobility, impaired skin, impaired joint extensibility, impaired muscle length. Hx of quadriplegia and stage 4 pressure ulcers. Recent need for blood due to low H&H.    Rehab Prognosis: Fair; patient would benefit from acute PT services to address these deficits and reach maximum level of function.    Plan:     During this hospitalization, patient to be seen  (5-6 days per week QD) to address the above listed problems via therapeutic activities, therapeutic exercises    Plan of Care Expires: 03/29/24    Subjective     Chief Complaint: Being in the bed for months. Spiking fever at night  Patient Comments/Goals: get home and back to work  Pain/Comfort:  Pain Rating 1: 0/10  Pain Rating Post-Intervention 1: 0/10    Social History:  Living Environment: Patient lives alone in a single story home with  sitter assistance  Prior Level of Function: Prior to admission, patient was bedbound, was non-ambulatory, and completed transfers via slide board with dependence  using no AD. Pt worked part time.   Equipment Used at Home: lift device, hospital bed, power chair  DME owned (not currently used): none  Assistance Upon Discharge: family and sitter(s)    Objective:     Communicated with Nursing and wound care prior to session. Patient found right sidelying with wound vac, PICC line, peripheral IV, perineural catheter upon PT entry to room.    General Precautions: Standard,     Orthopedic Precautions:      Braces:      Respiratory Status: Room air    Exams:  Cognition: Patient is oriented to Person, Place, Time, Situation  RLE ROM: Deficits: lacking 50-75% of normal ROM  RLE Strength: Deficits: 0/5  LLE ROM: Deficits: lacking 25-50% of normal ROM  LLE Strength: Deficits: 0/5      Functional Mobility:  Gait belt applied - No  Bed Mobility  Rolling Left: total assistance  Rolling Right: total assistance  Transfers  No occurring at this time. PT to proceed with caution due to state of wounds.     Therapeutic Activities and Exercises:   Patient educated on role of acute care PT and PT POC, safety while in hospital including calling nurse for mobility, and call light usage  Will progress to OOB activity with caution.   Wilberto with staff if needed.     AM-PAC 6 CLICK MOBILITY  Total Score:6    Patient left right sidelying with all lines intact, call button in reach, RN notified, and family present.    GOALS:   Multidisciplinary Problems       Physical Therapy Goals          Problem: Physical Therapy    Goal Priority Disciplines Outcome Goal Variances Interventions   Physical Therapy Goal     PT, PT/OT Ongoing, Progressing     Description: Goals to be met by: Discharge     Pt to be seen for ROM tasks QD to ensure proper positioning can be obtained to overall reduce impacts of prolonged bed rest and skin break down  Progress pending healing stages of current wounds    Patient will increase functional independence with mobility by performin. Pt to tolerate PROM tasks to B UE and LE, while obtaining 25% improvement in range.   2. Sitting EOB to tolerance pending wound healing - with pt demonstrating normal BP  3. Progress to OOB into chair once able.                          History:     Past Medical History:   Diagnosis Date    A-fib     Cardiac arrest     2022    Chronic skin ulcer     DM (diabetes mellitus)     Infected decubitus ulcer     Lymphedema of leg     Neurogenic bladder     Obesity, unspecified      Pressure ulcer of heel     Pressure ulcer of right foot, stage 3     Pressure ulcer of right ischium     Quadriplegia     Quadriplegic spinal paralysis        Past Surgical History:   Procedure Laterality Date    APPLICATION OF WOUND VACUUM-ASSISTED CLOSURE DEVICE Right 5/12/2023    Procedure: APPLICATION, WOUND VAC;  Surgeon: Keyonna Jean MD;  Location: [facility] OR;  Service: General;  Laterality: Right;    DEBRIDEMENT OF BUTTOCKS Right 5/12/2023    Procedure: DEBRIDEMENT, BUTTOCK;  Surgeon: Keyonna Jean MD;  Location: [facility] OR;  Service: General;  Laterality: Right;    INCISION AND DRAINAGE HIP Bilateral 8/16/2023    Procedure: INCISION AND DRAINAGE, HIP;  Surgeon: Ryan Tirado MD;  Location: [facility] OR;  Service: General;  Laterality: Bilateral;  1. Excisional debridement skin to bone, skin to bone with biopsy and debridement of hard bone at the Left hip necrotic wound 11 cm long 5-1/2 cm wide 4-1/2 cm deep upon completion of debridement   2. Excisional debridement skin to muscle right hip with debridement    PRESSURE ULCER DEBRIDEMENT N/A 2/27/2024    Procedure: DEBRIDEMENT, PRESSURE ULCER;  Surgeon: Keyonna Jean MD;  Location: [facility];  Service: General;  Laterality: N/A;       Time Tracking:     PT Received On: 03/01/24  PT Start Time: 1050  PT Stop Time: 1112  PT Total Time (min): 22 min     Billable Minutes: Evaluation moderate complexity     3/1/2024

## 2024-03-01 NOTE — PROGRESS NOTES
"Pharmacokinetic Follow Up: Tobramycin    Assessment of levels:   Random concentration of 10.6 mcg/mL (5 hours post-infusion) corresponds to a dosing interval of every 48 hours per the [place] Nom[employer]    Regimen Plan:   Change dosing regimen to: Tobramycin 400 mg IV every 36 hours    Drug levels (last 3 results):  No results for input(s): "AMIKACINPEAK", "AMIKACINTROU", "AMIKACINRAND", "AMIKACIN" in the last 72 hours.    No results for input(s): "GENTAMICIN", "GENTPEAK", "GENTTROUGH", "GENT10", "GENT12", "GENT8", "GENTRANDOM" in the last 72 hours.    Recent Labs   Lab Result Units 02/29/24  2055   Tobramycin Peak ug/ml 10.6*       Aminoglycoside Administrations:  aminoglycosides given in last 96 hours                     tobramycin (NEBCIN) 540 mg in dextrose 5 % (D5W) 100 mL IVPB (mg) 540 mg New Bag 02/29/24 1550     540 mg New Bag 02/28/24 1510    tobramycin (NEBCIN) 560 mg in sodium chloride 0.9% 100 mL IVPB (mg) 560 mg New Bag 02/27/24 1109                    Pharmacy will continue to monitor.    Please contact pharmacy at extension [phone] with any questions regarding this assessment.    Thank you for the consult,   Errol Garcia      Patient brief summary:  Antonio Galvan II is a 58 y.o. male initiated on aminoglycoside therapy for treatment of skin & soft tissue infection    Drug Allergies:   Review of patient's allergies indicates:  No Known Allergies    Weights:   Wt Readings from Last 1 Encounters:   02/26/24 93.9 kg (207 lb)     Ideal body weight: 66.1 kg (145 lb 11.6 oz)  Adjusted ideal body weight: 77.2 kg (170 lb 3.8 oz)    Renal Function:   Estimated Creatinine Clearance: 103.5 mL/min (based on SCr of 0.87 mg/dL).,     CBC (last 72 hours):  Recent Labs   Lab Result Units 02/28/24  0454 02/29/24  0519 03/01/24  0505   WBC x10(3)/mcL 8.50 7.52 8.90   Hgb g/dL 6.6* 8.9* 10.7*   Hct % 22.7* 27.7* 33.6*   Platelet x10(3)/mcL 279 261 298   Mono % % 6.6 6.3 5.3   Eos % % 2.4 4.4 4.2   Basophil % % 0.4 0.4 " 0.3       Metabolic Panel (last 72 hours):  Recent Labs   Lab Result Units 02/28/24  0454 02/29/24  0519 03/01/24  0505   Sodium Level mmol/L 128* 136 133*   Potassium Level mmol/L 5.1 4.5 4.9   Chloride mmol/L 101 106 104   Carbon Dioxide mmol/L 19* 19* 20*   Glucose Level mg/dL 334* 359* 275*   Blood Urea Nitrogen mg/dL 23.7 20.4 19.0   Creatinine mg/dL 1.20* 1.01 0.87   Albumin Level g/dL 1.6*  --   --    Bilirubin Total mg/dL 0.2  --   --    Alkaline Phosphatase unit/L 113  --   --    Aspartate Aminotransferase unit/L 7  --   --    Alanine Aminotransferase unit/L 14  --   --        Microbiologic Results:  Microbiology Results (last 7 days)       ** No results found for the last 168 hours. **

## 2024-03-01 NOTE — PROGRESS NOTES
[facility]  \[facility] [place] [facility]\"" MEDICINE - H&P ADMISSION NOTE    Patient Name: Antonio Galvan II  MRN: [id number]  Patient Class: IP- Swing   Admission Date: 2/26/2024   Admitting Physician: DUTCH Service   Attending Physician: ANTHONY Vazquez  Primary Care Provider: Jennifer Fernando NP  Face-to-Face encounter date: 03/01/2024      CHIEF COMPLAINT   No chief complaint on file.    HISTORY OF PRESENTING ILLNESS     This 58-year-old man with quadriplegic spinal paralysis and numerous decubitus ulcers who is followed by wound care is brought in today due to fever. He had wound cultures done 3 days ago that have grown Proteus mirabilis and Klebsiella pneumoniae. Sensitivities are not complete. The wounds were noted to have significant odor and heavy green drainage. In addition the patient had some nausea and vomiting and home health noted that his blood pressure was dipping. He was advised to come to the emergency room. Wound cultures were drawn from the right hip wound. His wife reports that when the wound is pressed above pus comes out. Both Klebsiella and Proteus maybe treated with fluoroquinolones which are both IV and oral. The patient's wife preferred if the patient came home so that she could attend to his wounds however the patient expressed a desire to be admitted.     We will admit for IV abx and wound care and a consult to Dr. Jaen when he is on campus for possible debridment.     No reported complaints today.  Levaquin was discontinued and cefepime initiated based off of culture sensitivity report.  Patient transitioned to our swing unit.    Today:  Hemoglobin stable today. Glucose levels were not documented throughout most of the day yesterday, therefore will monitor glucose today before further titration current insulin regimen. Fever noted overnight. Right hip tissue culture from surgery resulted with Proteus mirabilis sensitive to Tobramycin. Awaiting sacrum tissue culture from  surgery final results.     PAST MEDICAL HISTORY     Past Medical History:   Diagnosis Date    A-fib     Cardiac arrest     7/2022    Chronic skin ulcer     DM (diabetes mellitus)     Infected decubitus ulcer     Lymphedema of leg     Neurogenic bladder     Obesity, unspecified     Pressure ulcer of heel     Pressure ulcer of right foot, stage 3     Pressure ulcer of right ischium     Quadriplegia     Quadriplegic spinal paralysis      PAST SURGICAL HISTORY     Past Surgical History:   Procedure Laterality Date    APPLICATION OF WOUND VACUUM-ASSISTED CLOSURE DEVICE Right 5/12/2023    Procedure: APPLICATION, WOUND VAC;  Surgeon: Keyonna Jean MD;  Location: [facility] OR;  Service: General;  Laterality: Right;    DEBRIDEMENT OF BUTTOCKS Right 5/12/2023    Procedure: DEBRIDEMENT, BUTTOCK;  Surgeon: Keyonna Jean MD;  Location: [facility] OR;  Service: General;  Laterality: Right;    INCISION AND DRAINAGE HIP Bilateral 8/16/2023    Procedure: INCISION AND DRAINAGE, HIP;  Surgeon: Ryan Tirado MD;  Location: [facility] OR;  Service: General;  Laterality: Bilateral;  1. Excisional debridement skin to bone, skin to bone with biopsy and debridement of hard bone at the Left hip necrotic wound 11 cm long 5-1/2 cm wide 4-1/2 cm deep upon completion of debridement   2. Excisional debridement skin to muscle right hip with debridement    PRESSURE ULCER DEBRIDEMENT N/A 2/27/2024    Procedure: DEBRIDEMENT, PRESSURE ULCER;  Surgeon: Keyonna Jean MD;  Location: [facility] OR;  Service: General;  Laterality: N/A;     FAMILY HISTORY   Reviewed and noncontributory to this case    SOCIAL HISTORY     Social History     Socioeconomic History    Marital status: Single   Tobacco Use    Smoking status: Never    Smokeless tobacco: Never   Substance and Sexual Activity    Alcohol use: Never    Drug use: Never    Sexual activity: Not Currently     Social Determinants of Health     Financial Resource Strain: Low Risk  (2/27/2024)    Overall  Financial Resource Strain (CARDIA)     Difficulty of Paying Living Expenses: Not hard at all   Food Insecurity: No Food Insecurity (2/27/2024)    Hunger Vital Sign     Worried About Running Out of Food in the Last Year: Never true     Ran Out of Food in the Last Year: Never true   Transportation Needs: No Transportation Needs (2/27/2024)    PRAPARE - Transportation     Lack of Transportation (Medical): No     Lack of Transportation (Non-Medical): No   Physical Activity: Inactive (2/27/2024)    Exercise Vital Sign     Days of Exercise per Week: 0 days     Minutes of Exercise per Session: 0 min   Stress: No Stress Concern Present (2/27/2024)    [employer] - Occupational Stress Questionnaire     Feeling of Stress : Only a little   Recent Concern: Stress - Stress Concern Present (2/24/2024)    [employer] [place] [employer] - Occupational Stress Questionnaire     Feeling of Stress : To some extent   Social Connections: Moderately Integrated (2/27/2024)    Social Connection and Isolation Panel [NHANES]     Frequency of Communication with Friends and Family: More than three times a week     Frequency of Social Gatherings with Friends and Family: More than three times a week     Attends Scientologist Services: More than 4 times per year     Active Member of Clubs or Organizations: Yes     Attends Club or Organization Meetings: 1 to 4 times per year     Marital Status: Never    Housing Stability: Low Risk  (2/27/2024)    Housing Stability Vital Sign     Unable to Pay for Housing in the Last Year: No     Number of Places Lived in the Last Year: 1     Unstable Housing in the Last Year: No     HOME MEDICATIONS     Prior to Admission medications    Medication Sig Start Date End Date Taking? Authorizing Provider   albuterol (PROVENTIL) 2.5 mg /3 mL (0.083 %) nebulizer solution Inhale 2.5 mg into the lungs. 12/8/21 9/28/23  Provider, Historical   ALKALOL NASAL WASH Soln 50 mLs by Nasal  route once daily. 8/15/22   Provider, Historical   ascorbic acid, vitamin C, (VITAMIN C) 500 MG tablet Take 1 tablet (500 mg total) by mouth once daily. 9/28/23   Jeanette Mann MD   aspirin (ECOTRIN) 81 MG EC tablet Take 1 tablet (81 mg total) by mouth once daily. 9/28/23 9/27/24  Jeanette Mann MD   bisacodyL (DULCOLAX) 10 mg Supp Place 1 suppository (10 mg total) rectally daily as needed (Until bowel movement if patient has no bowel movement for 2 days). 9/27/23   Jeanette Mann MD   budesonide (PULMICORT) 0.5 mg/2 mL nebulizer solution Take 2 mLs (0.5 mg total) by nebulization daily as needed (Wheezing, SOB). Controller 9/27/23 9/26/24  Jeanette Mann MD   carvediloL (COREG) 12.5 MG tablet Take 1 tablet (12.5 mg total) by mouth 2 (two) times daily with meals. 9/27/23 9/26/24  Jeanette Mann MD   collagenase (SANTYL) ointment Apply topically once daily.  Patient not taking: Reported on 9/28/2023 5/25/23   Syl Avila MD   doxycycline (VIBRAMYCIN) 100 MG Cap Take 1 capsule (100 mg total) by mouth 2 (two) times daily. for 10 days 2/20/24 3/1/24  Ronald Barcenas MD   ferrous sulfate 325 (65 FE) MG EC tablet Take 1 tablet (325 mg total) by mouth once daily. 9/27/23   Jeanette Mann MD   insulin detemir U-100 (LEVEMIR) 100 unit/mL injection Inject 7 Units into the skin every evening.  Patient not taking: Reported on 9/28/2023 9/27/23 9/26/24  Jeanette Mann MD   JANUVIA 50 mg Tab Take 100 mg by mouth once daily. 5/4/22   Provider, Historical   polyethylene glycol (GLYCOLAX) 17 gram PwPk Take 17 g by mouth 3 (three) times daily as needed (nausea vomiting).  Patient not taking: Reported on 9/28/2023 9/27/23   Jeanette Mann MD     ALLERGIES   Patient has no known allergies.    REVIEW OF SYSTEMS   Except as documented above, all other systems reviewed and negative    PHYSICAL EXAM     Vitals:    03/01/24 1008   BP:    Pulse:    Resp: 17   Temp:         General:  In no acute  distress, resting comfortably  Head and neck:  Atraumatic, normocephalic, moist mucous membranes  Chest:  Clear to auscultation bilaterally  Heart:  S1, S2, no appreciable murmur  Abdomen:  Soft, nontender, BS +  MSK:  Warm, quadriplegic   Neuro:  Alert and oriented x4, quadriplegic   Integumentary:  See media for wound images   Psychiatry:  Appropriate mood and affect    ASSESSMENT AND PLAN     Multiple infected wounds to sacrum, hip, abdomen  Klebsiella pneumoniae, Proteus mirabilis, Pseudomonas aeruginosa  Discontinue Levaquin based off of sensitivity report (02/23/2024-02/26/2024)  Transitioned to cefepime on 02/26/2024 and was discontinued on 02/27/2024 continued hyperglycemia as it is mixed with D5W  Pharmacy recommended to begin Meerem 1g Q8 on 02/27/2024 x 13 days  Tobramycin per pharmacy dosing 02/27/2024 x14 days   Continue with both Merrem and tobramycin for now, may be able to deescalate once sacrum tissue culture from debridement results  Blood cultures remain negative at this time, sacrum tissue culture from debridement pending sensitivity report  S/p debridement with Dr. Jean on 02/27/2024    Post op anemia   H&H improved s/p 2u pRBC 02/28/2024   Bleeding noted to wound, will give an addition 1u pRBC today   No anticoagulation at this time - aspirin d/c 02/28/2024     Hyperglycemia in setting of DM  A1c 02/23/2024 8.5%  Reports taking 80u Levemir BID  Continue home Sitagliptin  Continue detemir 10u q.a.m., Asaprt 5u TIDWM, and moderate dose SSI   Glucose levels were not documented throughout most of the day yesterday, therefore will monitor glucose today before further titration current insulin regimen     Hypotension - resolved      Hx of: Neurogenic bladder, Quadriplegia     DVT prophylaxis:  SCDs, history of bleeding in the recent past  __________________________________________________________________  LABS/MICRO/MEDS/DIAGNOSTICS     LABS  Recent Labs     02/28/24  0454 02/29/24  0519  03/01/24  0505   *   < > 133*   K 5.1   < > 4.9   CHLORIDE 101   < > 104   CO2 19*   < > 20*   BUN 23.7   < > 19.0   CREATININE 1.20*   < > 0.87   GLUCOSE 334*   < > 275*   CALCIUM 8.1*   < > 8.3*   ALKPHOS 113  --   --    AST 7  --   --    ALT 14  --   --    ALBUMIN 1.6*  --   --     < > = values in this interval not displayed.     Recent Labs     03/01/24  0505   WBC 8.90   RBC 3.94*   HCT 33.6*   MCV 85.3        MICROBIOLOGY  Microbiology Results (last 7 days)       ** No results found for the last 168 hours. **          MEDICATIONS   acetaminophen  650 mg Oral QHS    ascorbic acid (vitamin C)  500 mg Oral Daily    cetirizine  10 mg Oral Daily    collagenase   Topical (Top) Daily    ferrous sulfate  1 tablet Oral Daily    insulin aspart U-100  5 Units Subcutaneous TIDWM    insulin detemir U-100  10 Units Subcutaneous Daily    meropenem (MERREM) IVPB  1 g Intravenous Q8H    miconazole NITRATE 2 %   Topical (Top) BID    pantoprazole  40 mg Oral Daily    SITagliptin phosphate  100 mg Oral Daily    sodium chloride 0.9%  500 mL Intravenous Once    sodium chloride 0.9%  10 mL Intravenous Q6H    [START ON 3/2/2024] tobramycin IV (PEDS and ADULTS)  400 mg Intravenous Q36H      INFUSIONS    DIAGNOSTIC TESTS  X-Ray Chest 1 View   Final Result      Left PICC tip overlies the mid SVC.         Electronically signed by: Oj Solomon   Date:    02/28/2024   Time:    14:29         Patient information was obtained from patient, patient's family, past medical records and ER records.   All diagnosis and differential diagnosis have been reviewed; assessment and plan has been documented. I have personally reviewed the labs and test results that are presently available; I have reviewed the patients medication list. I have reviewed the consulting providers response and recommendations. I have reviewed or attempted to review medical records based upon their availability.  All of the patient's questions have been addressed  and answered. Patient's is agreeable to the above stated plan. I will continue to monitor closely and make adjustments to medical management as needed.  This note was created using iPourit voice recognition software that occasionally misinterpreted phrases or words.  Please contact me if any questions may rise regarding documentation to clarify verbiage.      ANTHONY Vazquez   Internal Medicine  Department of Hospital Medicine [facility] - [facility]

## 2024-03-01 NOTE — PLAN OF CARE
Problem: Physical Therapy  Goal: Physical Therapy Goal  Description: Goals to be met by: Discharge     Pt to be seen for ROM tasks QD to ensure proper positioning can be obtained to overall reduce impacts of prolonged bed rest and skin break down  Progress pending healing stages of current wounds    Patient will increase functional independence with mobility by performin. Pt to tolerate PROM tasks to B UE and LE, while obtaining 25% improvement in range.   2. Sitting EOB to tolerance pending wound healing - with pt demonstrating normal BP  3. Progress to OOB into chair once able.     Outcome: Ongoing, Progressing

## 2024-03-02 LAB
ANION GAP SERPL CALC-SCNC: 11 MEQ/L
BACTERIA TISS AEROBE CULT: ABNORMAL
BASOPHILS # BLD AUTO: 0.05 X10(3)/MCL
BASOPHILS NFR BLD AUTO: 0.6 %
BUN SERPL-MCNC: 19.9 MG/DL (ref 8.4–25.7)
CALCIUM SERPL-MCNC: 8.3 MG/DL (ref 8.4–10.2)
CHLORIDE SERPL-SCNC: 104 MMOL/L (ref 98–107)
CO2 SERPL-SCNC: 21 MMOL/L (ref 22–29)
CREAT SERPL-MCNC: 0.92 MG/DL (ref 0.73–1.18)
CREAT/UREA NIT SERPL: 22
EOSINOPHIL # BLD AUTO: 0.43 X10(3)/MCL (ref 0–0.9)
EOSINOPHIL NFR BLD AUTO: 4.8 %
ERYTHROCYTE [DISTWIDTH] IN BLOOD BY AUTOMATED COUNT: 15.8 % (ref 11.5–17)
GFR SERPLBLD CREATININE-BSD FMLA CKD-EPI: >60 MLS/MIN/1.73/M2
GLUCOSE SERPL-MCNC: 250 MG/DL (ref 74–100)
HCT VFR BLD AUTO: 35.1 % (ref 42–52)
HGB BLD-MCNC: 11.1 G/DL (ref 14–18)
IMM GRANULOCYTES # BLD AUTO: 0.09 X10(3)/MCL (ref 0–0.04)
IMM GRANULOCYTES NFR BLD AUTO: 1 %
LYMPHOCYTES # BLD AUTO: 2.47 X10(3)/MCL (ref 0.6–4.6)
LYMPHOCYTES NFR BLD AUTO: 27.5 %
MCH RBC QN AUTO: 27.2 PG (ref 27–31)
MCHC RBC AUTO-ENTMCNC: 31.6 G/DL (ref 33–36)
MCV RBC AUTO: 86 FL (ref 80–94)
MONOCYTES # BLD AUTO: 0.63 X10(3)/MCL (ref 0.1–1.3)
MONOCYTES NFR BLD AUTO: 7 %
NEUTROPHILS # BLD AUTO: 5.32 X10(3)/MCL (ref 2.1–9.2)
NEUTROPHILS NFR BLD AUTO: 59.1 %
PLATELET # BLD AUTO: 323 X10(3)/MCL (ref 130–400)
PMV BLD AUTO: 10.9 FL (ref 7.4–10.4)
POCT GLUCOSE: 196 MG/DL (ref 70–110)
POCT GLUCOSE: 244 MG/DL (ref 70–110)
POCT GLUCOSE: 277 MG/DL (ref 70–110)
POCT GLUCOSE: 286 MG/DL (ref 70–110)
POTASSIUM SERPL-SCNC: 4.9 MMOL/L (ref 3.5–5.1)
RBC # BLD AUTO: 4.08 X10(6)/MCL (ref 4.7–6.1)
SODIUM SERPL-SCNC: 136 MMOL/L (ref 136–145)
WBC # SPEC AUTO: 8.99 X10(3)/MCL (ref 4.5–11.5)

## 2024-03-02 PROCEDURE — 25000003 PHARM REV CODE 250: Performed by: PHYSICIAN ASSISTANT

## 2024-03-02 PROCEDURE — 27000207 HC ISOLATION

## 2024-03-02 PROCEDURE — 94760 N-INVAS EAR/PLS OXIMETRY 1: CPT

## 2024-03-02 PROCEDURE — 97110 THERAPEUTIC EXERCISES: CPT

## 2024-03-02 PROCEDURE — 63600175 PHARM REV CODE 636 W HCPCS: Performed by: PHYSICIAN ASSISTANT

## 2024-03-02 PROCEDURE — 63600175 PHARM REV CODE 636 W HCPCS: Performed by: STUDENT IN AN ORGANIZED HEALTH CARE EDUCATION/TRAINING PROGRAM

## 2024-03-02 PROCEDURE — 80048 BASIC METABOLIC PNL TOTAL CA: CPT | Performed by: STUDENT IN AN ORGANIZED HEALTH CARE EDUCATION/TRAINING PROGRAM

## 2024-03-02 PROCEDURE — 25000003 PHARM REV CODE 250: Performed by: STUDENT IN AN ORGANIZED HEALTH CARE EDUCATION/TRAINING PROGRAM

## 2024-03-02 PROCEDURE — A4216 STERILE WATER/SALINE, 10 ML: HCPCS | Performed by: INTERNAL MEDICINE

## 2024-03-02 PROCEDURE — 97530 THERAPEUTIC ACTIVITIES: CPT

## 2024-03-02 PROCEDURE — 25000003 PHARM REV CODE 250: Performed by: INTERNAL MEDICINE

## 2024-03-02 PROCEDURE — 11000004 HC SNF PRIVATE

## 2024-03-02 PROCEDURE — 85025 COMPLETE CBC W/AUTO DIFF WBC: CPT | Performed by: STUDENT IN AN ORGANIZED HEALTH CARE EDUCATION/TRAINING PROGRAM

## 2024-03-02 PROCEDURE — 99900035 HC TECH TIME PER 15 MIN (STAT)

## 2024-03-02 RX ORDER — SELENIUM 50 MCG
1 TABLET ORAL
Status: DISCONTINUED | OUTPATIENT
Start: 2024-03-02 | End: 2024-03-31

## 2024-03-02 RX ORDER — LINEZOLID 600 MG/1
600 TABLET, FILM COATED ORAL EVERY 12 HOURS
Status: DISCONTINUED | OUTPATIENT
Start: 2024-03-02 | End: 2024-03-28

## 2024-03-02 RX ADMIN — CETIRIZINE HYDROCHLORIDE 10 MG: 10 TABLET, FILM COATED ORAL at 09:03

## 2024-03-02 RX ADMIN — INSULIN ASPART 5 UNITS: 100 INJECTION, SOLUTION INTRAVENOUS; SUBCUTANEOUS at 09:03

## 2024-03-02 RX ADMIN — INSULIN ASPART 4 UNITS: 100 INJECTION, SOLUTION INTRAVENOUS; SUBCUTANEOUS at 05:03

## 2024-03-02 RX ADMIN — MICONAZOLE NITRATE 2 % TOPICAL POWDER: at 09:03

## 2024-03-02 RX ADMIN — Medication 1 CAPSULE: at 06:03

## 2024-03-02 RX ADMIN — SODIUM CHLORIDE, PRESERVATIVE FREE 10 ML: 5 INJECTION INTRAVENOUS at 02:03

## 2024-03-02 RX ADMIN — Medication 10 ML: at 04:03

## 2024-03-02 RX ADMIN — Medication 6 MG: at 09:03

## 2024-03-02 RX ADMIN — MEROPENEM 1 G: 1 INJECTION, POWDER, FOR SOLUTION INTRAVENOUS at 10:03

## 2024-03-02 RX ADMIN — SODIUM CHLORIDE: 9 INJECTION, SOLUTION INTRAVENOUS at 02:03

## 2024-03-02 RX ADMIN — CEFTRIAXONE SODIUM 2 G: 2 INJECTION, POWDER, FOR SOLUTION INTRAMUSCULAR; INTRAVENOUS at 02:03

## 2024-03-02 RX ADMIN — SITAGLIPTIN 100 MG: 50 TABLET, FILM COATED ORAL at 09:03

## 2024-03-02 RX ADMIN — SODIUM CHLORIDE, PRESERVATIVE FREE 10 ML: 5 INJECTION INTRAVENOUS at 12:03

## 2024-03-02 RX ADMIN — OXYCODONE HYDROCHLORIDE AND ACETAMINOPHEN 500 MG: 500 TABLET ORAL at 09:03

## 2024-03-02 RX ADMIN — INSULIN ASPART 3 UNITS: 100 INJECTION, SOLUTION INTRAVENOUS; SUBCUTANEOUS at 09:03

## 2024-03-02 RX ADMIN — INSULIN ASPART 5 UNITS: 100 INJECTION, SOLUTION INTRAVENOUS; SUBCUTANEOUS at 05:03

## 2024-03-02 RX ADMIN — HYDROCODONE BITARTRATE AND ACETAMINOPHEN 1 TABLET: 5; 325 TABLET ORAL at 04:03

## 2024-03-02 RX ADMIN — MEROPENEM 1 G: 1 INJECTION, POWDER, FOR SOLUTION INTRAVENOUS at 12:03

## 2024-03-02 RX ADMIN — SODIUM CHLORIDE: 9 INJECTION, SOLUTION INTRAVENOUS at 04:03

## 2024-03-02 RX ADMIN — SODIUM CHLORIDE: 9 INJECTION, SOLUTION INTRAVENOUS at 12:03

## 2024-03-02 RX ADMIN — SODIUM CHLORIDE, PRESERVATIVE FREE 10 ML: 5 INJECTION INTRAVENOUS at 05:03

## 2024-03-02 RX ADMIN — PANTOPRAZOLE SODIUM 40 MG: 40 TABLET, DELAYED RELEASE ORAL at 09:03

## 2024-03-02 RX ADMIN — LINEZOLID 600 MG: 600 TABLET, FILM COATED ORAL at 10:03

## 2024-03-02 RX ADMIN — SODIUM CHLORIDE, PRESERVATIVE FREE 10 ML: 5 INJECTION INTRAVENOUS at 06:03

## 2024-03-02 RX ADMIN — MICONAZOLE NITRATE 2 % TOPICAL POWDER: at 10:03

## 2024-03-02 RX ADMIN — COLLAGENASE SANTYL: 250 OINTMENT TOPICAL at 10:03

## 2024-03-02 RX ADMIN — ACETAMINOPHEN 650 MG: 325 TABLET ORAL at 09:03

## 2024-03-02 RX ADMIN — SODIUM CHLORIDE: 9 INJECTION, SOLUTION INTRAVENOUS at 10:03

## 2024-03-02 RX ADMIN — INSULIN ASPART 5 UNITS: 100 INJECTION, SOLUTION INTRAVENOUS; SUBCUTANEOUS at 11:03

## 2024-03-02 RX ADMIN — INSULIN DETEMIR 10 UNITS: 100 INJECTION, SOLUTION SUBCUTANEOUS at 09:03

## 2024-03-02 RX ADMIN — TOBRAMYCIN SULFATE 400 MG: 40 INJECTION, SOLUTION INTRAMUSCULAR; INTRAVENOUS at 04:03

## 2024-03-02 RX ADMIN — FERROUS SULFATE TAB 325 MG (65 MG ELEMENTAL FE) 1 EACH: 325 (65 FE) TAB at 09:03

## 2024-03-02 NOTE — PLAN OF CARE
Problem: Adult Inpatient Plan of Care  Goal: Plan of Care Review  Outcome: Ongoing, Progressing  Flowsheets (Taken 3/1/2024 2020)  Plan of Care Reviewed With:   patient   father  Goal: Patient-Specific Goal (Individualized)  Outcome: Ongoing, Progressing  Flowsheets (Taken 3/1/2024 2020)  Anxieties, Fears or Concerns: Wounds healing  Individualized Care Needs:   Wound care/management as ordered, freq checks of wound vac fuctionality   Monitor blood glucose levels   Monitor for worsening s/s of infection   Antibiotic therapy as ordered   Promote sleep with med as ordered.  Goal: Absence of Hospital-Acquired Illness or Injury  Outcome: Ongoing, Progressing  Intervention: Identify and Manage Fall Risk  Flowsheets (Taken 3/1/2024 2020)  Safety Promotion/Fall Prevention:   bed alarm set   assistive device/personal item within reach   Fall Risk reviewed with patient/family   Fall Risk signage in place   family to remain at bedside   medications reviewed   lighting adjusted   room near unit station  Intervention: Prevent Skin Injury  Flowsheets (Taken 3/1/2024 2020)  Body Position:   supine   tilted   right   weight shifting   heels elevated  Skin Protection:   adhesive use limited   incontinence pads utilized   protective footwear used   skin sealant/moisture barrier applied   tubing/devices free from skin contact  Intervention: Prevent and Manage VTE (Venous Thromboembolism) Risk  Flowsheets (Taken 3/1/2024 2020)  Activity Management: Rolling - L1  VTE Prevention/Management:   bleeding risk assessed   bleeding precations maintained   fluids promoted   ROM (passive) performed  Intervention: Prevent Infection  Flowsheets (Taken 3/1/2024 2020)  Infection Prevention:   environmental surveillance performed   equipment surfaces disinfected   hand hygiene promoted   personal protective equipment utilized   rest/sleep promoted  Goal: Optimal Comfort and Wellbeing  Outcome: Ongoing, Progressing  Intervention: Monitor Pain and  Promote Comfort  Flowsheets (Taken 3/1/2024 2020)  Pain Management Interventions:   pain management plan reviewed with patient/caregiver   medication offered but refused   care clustered   pillow support provided   position adjusted   quiet environment facilitated  Intervention: Provide Person-Centered Care  Flowsheets (Taken 3/1/2024 2020)  Trust Relationship/Rapport:   care explained   choices provided   emotional support provided   empathic listening provided   questions answered   questions encouraged   reassurance provided   thoughts/feelings acknowledged     Problem: Diabetes Comorbidity  Goal: Blood Glucose Level Within Targeted Range  Outcome: Ongoing, Progressing  Intervention: Monitor and Manage Glycemia  Flowsheets (Taken 3/1/2024 2020)  Glycemic Management:   blood glucose monitored   supplemental insulin given     Problem: Skin Injury Risk Increased  Goal: Skin Health and Integrity  Outcome: Ongoing, Progressing  Intervention: Optimize Skin Protection  Flowsheets (Taken 3/1/2024 2020)  Pressure Reduction Techniques:   weight shift assistance provided   positioned off wounds  Pressure Reduction Devices: heel offloading device utilized  Skin Protection:   adhesive use limited   incontinence pads utilized   protective footwear used   skin sealant/moisture barrier applied   tubing/devices free from skin contact  Head of Bed (HOB) Positioning: HOB at 20-30 degrees  Intervention: Promote and Optimize Oral Intake  Flowsheets (Taken 3/1/2024 2020)  Oral Nutrition Promotion:   calorie-dense foods provided   calorie-dense liquids provided     Problem: Impaired Wound Healing  Goal: Optimal Wound Healing  Outcome: Ongoing, Progressing  Intervention: Promote Wound Healing  Flowsheets (Taken 3/1/2024 2020)  Oral Nutrition Promotion:   calorie-dense foods provided   calorie-dense liquids provided  Sleep/Rest Enhancement:   regular sleep/rest pattern promoted   room darkened   noise level reduced  Activity Management:  Rolling - L1  Pain Management Interventions:   pain management plan reviewed with patient/caregiver   medication offered but refused   care clustered   pillow support provided   position adjusted   quiet environment facilitated     Problem: Fall Injury Risk  Goal: Absence of Fall and Fall-Related Injury  Outcome: Ongoing, Progressing     Problem: Infection  Goal: Absence of Infection Signs and Symptoms  Outcome: Ongoing, Progressing  Intervention: Prevent or Manage Infection  Flowsheets (Taken 3/1/2024 2020)  Fever Reduction/Comfort Measures:   lightweight bedding   lightweight clothing  Infection Management: aseptic technique maintained

## 2024-03-02 NOTE — NURSING
"Eliza MARTIN and myself educated patients girlfriend and friends entering the room about contact precautions and donning PPE d/t patient testing positive for VRE in wound culture. We were laughed at and told "no, I will take my chances."    Eliza reeducated family members after concerns were expressed. Still no ppe in the room.        "

## 2024-03-02 NOTE — PT/OT/SLP PROGRESS
Physical Therapy Treatment Note           Name: Antonio Galvan II    : 1965 (58 y.o.)  MRN: [id number]           TREATMENT SUMMARY AND RECOMMENDATIONS:    PT Received On: 24  PT Start Time: 1135     PT Stop Time: 1200  PT Total Time (min): 25 min     Subjective Assessment:   No complaints  Lethargic   x Awake, alert, cooperative  Uncooperative    Agitated  c/o pain    Appropriate  c/o fatigue    Confused x Treated at bedside     Emotionally labile  Treated in gym/dept.    Impulsive  Other:    Flat affect       Therapy Precautions:    Cognitive deficits  Spinal precautions    Collar - hard  Sternal precautions    Collar - soft   TLSO    Fall risk  LSO    Hip precautions - posterior  Knee immobilizer    Hip precautions - anterior  WBAT    Impaired communication  Partial weightbearing    Oxygen  TTWB    PEG tube  NWB    Visual deficits x Other: Wound Vac, super pubic cath.     Hearing deficits          Treatment Objectives:     Mobility Training:   Assist level Comments    Bed mobility Total A X 2 person assist to position from L side lying to R side lying    Transfer     Gait     Sit to stand transitions     Sitting balance     Standing balance      Wheelchair mobility     Car transfer     Other:          Therapeutic Exercise:   Exercise Sets Reps Comments   PROM to B UE and LE all joints  10 All functional planes to maintain and improve mobility to maximize positioning ability to reduce skin break down.                          Additional Comments:  Pt kept having hot flashes and needed the fan. Pt was slightly irritated, feeling like no one was worried bout his wounds.     Assessment: Patient tolerated session fair.    PT Plan: continue per POC  Revisions made to plan of care: No    GOALS:   Multidisciplinary Problems       Physical Therapy Goals          Problem: Physical Therapy    Goal Priority Disciplines Outcome Goal Variances Interventions   Physical Therapy Goal     PT, PT/OT Ongoing,  Progressing     Description: Goals to be met by: Discharge     Pt to be seen for ROM tasks QD to ensure proper positioning can be obtained to overall reduce impacts of prolonged bed rest and skin break down  Progress pending healing stages of current wounds    Patient will increase functional independence with mobility by performin. Pt to tolerate PROM tasks to B UE and LE, while obtaining 25% improvement in range.   2. Sitting EOB to tolerance pending wound healing - with pt demonstrating normal BP  3. Progress to OOB into chair once able.                          Skilled PT Minutes Provided: 25   Communication with Treatment Team:     Equipment recommendations:       At end of treatment, patient remained:   Up in chair     Up in wheelchair in room   x In bed    With alarm activated    Bed rails up   x Call bell in reach    x Family/friends present    Restraints secured properly    In bathroom with CNA/RN notified   x Nurse aware    In gym with therapist/tech    Other:

## 2024-03-02 NOTE — PLAN OF CARE
Problem: Adult Inpatient Plan of Care  Goal: Plan of Care Review  Outcome: Ongoing, Progressing  Flowsheets (Taken 3/1/2024 1908)  Plan of Care Reviewed With:   patient   mother  Goal: Patient-Specific Goal (Individualized)  Outcome: Ongoing, Progressing  Flowsheets (Taken 3/1/2024 1908)  Anxieties, Fears or Concerns: blood sugars running high  Individualized Care Needs: insulin given as ordered, IV abt, wound care, turn q 2  Goal: Absence of Hospital-Acquired Illness or Injury  Outcome: Ongoing, Progressing  Intervention: Identify and Manage Fall Risk  Flowsheets (Taken 3/1/2024 1908)  Safety Promotion/Fall Prevention:   assistive device/personal item within reach   instructed to call staff for mobility   room near unit station   medications reviewed  Intervention: Prevent Skin Injury  Flowsheets (Taken 3/1/2024 1908)  Body Position:   turned   sitting up in bed  Skin Protection:   adhesive use limited   skin sealant/moisture barrier applied   tubing/devices free from skin contact  Intervention: Prevent and Manage VTE (Venous Thromboembolism) Risk  Flowsheets (Taken 3/1/2024 1908)  Activity Management: Rolling - L1  VTE Prevention/Management:   bleeding precations maintained   bleeding risk assessed  Range of Motion: ROM (range of motion) performed  Intervention: Prevent Infection  Flowsheets (Taken 3/1/2024 1908)  Infection Prevention:   hand hygiene promoted   single patient room provided  Goal: Optimal Comfort and Wellbeing  Outcome: Ongoing, Progressing  Intervention: Monitor Pain and Promote Comfort  Flowsheets (Taken 3/1/2024 1908)  Pain Management Interventions:   medication offered   premedicated for activity   relaxation techniques promoted   quiet environment facilitated   pillow support provided   position adjusted  Intervention: Provide Person-Centered Care  Flowsheets (Taken 3/1/2024 1908)  Trust Relationship/Rapport:   care explained   choices provided   questions answered   questions encouraged    reassurance provided  Goal: Readiness for Transition of Care  Outcome: Ongoing, Progressing     Problem: Diabetes Comorbidity  Goal: Blood Glucose Level Within Targeted Range  Outcome: Ongoing, Progressing  Intervention: Monitor and Manage Glycemia  Flowsheets (Taken 3/1/2024 7989)  Glycemic Management:   blood glucose monitored   supplemental insulin given     Problem: Skin Injury Risk Increased  Goal: Skin Health and Integrity  Outcome: Ongoing, Progressing     Problem: Impaired Wound Healing  Goal: Optimal Wound Healing  Outcome: Ongoing, Progressing     Problem: Fall Injury Risk  Goal: Absence of Fall and Fall-Related Injury  Outcome: Ongoing, Progressing     Problem: Infection  Goal: Absence of Infection Signs and Symptoms  Outcome: Ongoing, Progressing

## 2024-03-02 NOTE — PLAN OF CARE
Problem: Adult Inpatient Plan of Care  Goal: Plan of Care Review  Outcome: Ongoing, Progressing  Goal: Patient-Specific Goal (Individualized)  Outcome: Ongoing, Progressing  Flowsheets (Taken 3/2/2024 1510)  Anxieties, Fears or Concerns: wounds and antibiotics  Individualized Care Needs: wound care, monitor blood glucose, IV abx therapy  Goal: Absence of Hospital-Acquired Illness or Injury  Outcome: Ongoing, Progressing  Intervention: Identify and Manage Fall Risk  Flowsheets (Taken 3/2/2024 1510)  Safety Promotion/Fall Prevention:   assistive device/personal item within reach   bed alarm set   nonskid shoes/socks when out of bed   side rails raised x 2  Goal: Optimal Comfort and Wellbeing  Outcome: Ongoing, Progressing  Goal: Readiness for Transition of Care  Outcome: Ongoing, Progressing     Problem: Diabetes Comorbidity  Goal: Blood Glucose Level Within Targeted Range  Outcome: Ongoing, Progressing  Intervention: Monitor and Manage Glycemia  Flowsheets (Taken 3/2/2024 1510)  Glycemic Management: blood glucose monitored     Problem: Skin Injury Risk Increased  Goal: Skin Health and Integrity  Outcome: Ongoing, Progressing     Problem: Impaired Wound Healing  Goal: Optimal Wound Healing  Outcome: Ongoing, Progressing  Intervention: Promote Wound Healing  Flowsheets (Taken 3/2/2024 1510)  Oral Nutrition Promotion:   calorie-dense foods provided   calorie-dense liquids provided  Pain Management Interventions: care clustered     Problem: Fall Injury Risk  Goal: Absence of Fall and Fall-Related Injury  Outcome: Ongoing, Progressing  Intervention: Identify and Manage Contributors  Flowsheets (Taken 3/2/2024 1510)  Self-Care Promotion:   independence encouraged   safe use of adaptive equipment encouraged   BADL personal objects within reach  Medication Review/Management: medications reviewed     Problem: Infection  Goal: Absence of Infection Signs and Symptoms  Outcome: Ongoing, Progressing  Intervention: Prevent or  Manage Infection  Flowsheets (Taken 3/2/2024 1510)  Infection Management: aseptic technique maintained  Isolation Precautions: protective

## 2024-03-02 NOTE — PROGRESS NOTES
[facility]  \[facility] [place] [facility]\"" MEDICINE - H&P ADMISSION NOTE    Patient Name: Antonio Galvan II  MRN: [id number]  Patient Class: IP- Swing   Admission Date: 2/26/2024   Admitting Physician: DUTCH Service   Attending Physician: Thairy G Reyes, DO  Primary Care Provider: Jennifer Fernnado NP  Face-to-Face encounter date: 03/02/2024      CHIEF COMPLAINT   Chronic nonhealing wound, osteomyelitis  HISTORY OF PRESENTING ILLNESS   58-year-old man with quadriplegic spinal paralysis and numerous decubitus ulcers who is followed by wound care is brought in today due to fever. He had wound cultures done 3 days ago that have grown Proteus mirabilis and Klebsiella pneumoniae. Sensitivities are not complete. The wounds were noted to have significant odor and heavy green drainage. In addition the patient had some nausea and vomiting and home health noted that his blood pressure was dipping. He was advised to come to the emergency room. Wound cultures were drawn from the right hip wound. His wife reports that when the wound is pressed above pus comes out. Both Klebsiella and Proteus maybe treated with fluoroquinolones which are both IV and oral. The patient's wife preferred if the patient came home so that she could attend to his wounds however the patient expressed a desire to be admitted.    Today:  No complaints, hemoglobin stable  PAST MEDICAL HISTORY     Past Medical History:   Diagnosis Date    A-fib     Cardiac arrest     7/2022    Chronic skin ulcer     DM (diabetes mellitus)     Infected decubitus ulcer     Lymphedema of leg     Neurogenic bladder     Obesity, unspecified     Pressure ulcer of heel     Pressure ulcer of right foot, stage 3     Pressure ulcer of right ischium     Quadriplegia     Quadriplegic spinal paralysis      PAST SURGICAL HISTORY     Past Surgical History:   Procedure Laterality Date    APPLICATION OF WOUND VACUUM-ASSISTED CLOSURE DEVICE Right 5/12/2023    Procedure: APPLICATION,  WOUND VAC;  Surgeon: Keyonna Jean MD;  Location: [facility] OR;  Service: General;  Laterality: Right;    DEBRIDEMENT OF BUTTOCKS Right 5/12/2023    Procedure: DEBRIDEMENT, BUTTOCK;  Surgeon: Keyonna Jean MD;  Location: [facility] OR;  Service: General;  Laterality: Right;    INCISION AND DRAINAGE HIP Bilateral 8/16/2023    Procedure: INCISION AND DRAINAGE, HIP;  Surgeon: Ryan Tirado MD;  Location: [facility] OR;  Service: General;  Laterality: Bilateral;  1. Excisional debridement skin to bone, skin to bone with biopsy and debridement of hard bone at the Left hip necrotic wound 11 cm long 5-1/2 cm wide 4-1/2 cm deep upon completion of debridement   2. Excisional debridement skin to muscle right hip with debridement    PRESSURE ULCER DEBRIDEMENT N/A 2/27/2024    Procedure: DEBRIDEMENT, PRESSURE ULCER;  Surgeon: Keyonna Jean MD;  Location: [facility] OR;  Service: General;  Laterality: N/A;     FAMILY HISTORY   Reviewed and noncontributory to this case    SOCIAL HISTORY     Social History     Socioeconomic History    Marital status: Single   Tobacco Use    Smoking status: Never    Smokeless tobacco: Never   Substance and Sexual Activity    Alcohol use: Never    Drug use: Never    Sexual activity: Not Currently     Social Determinants of Health     Financial Resource Strain: Low Risk  (2/27/2024)    Overall Financial Resource Strain (CARDIA)     Difficulty of Paying Living Expenses: Not hard at all   Food Insecurity: No Food Insecurity (2/27/2024)    Hunger Vital Sign     Worried About Running Out of Food in the Last Year: Never true     Ran Out of Food in the Last Year: Never true   Transportation Needs: No Transportation Needs (2/27/2024)    PRAPARE - Transportation     Lack of Transportation (Medical): No     Lack of Transportation (Non-Medical): No   Physical Activity: Inactive (2/27/2024)    Exercise Vital Sign     Days of Exercise per Week: 0 days     Minutes of Exercise per Session: 0 min   Stress: No  Stress Concern Present (2/27/2024)    [facility] [place] of Occupational Health - Occupational Stress Questionnaire     Feeling of Stress : Only a little   Recent Concern: Stress - Stress Concern Present (2/24/2024)    [facility] [place] of Occupational Health - Occupational Stress Questionnaire     Feeling of Stress : To some extent   Social Connections: Moderately Integrated (2/27/2024)    Social Connection and Isolation Panel [NHANES]     Frequency of Communication with Friends and Family: More than three times a week     Frequency of Social Gatherings with Friends and Family: More than three times a week     Attends Yarsanism Services: More than 4 times per year     Active Member of Clubs or Organizations: Yes     Attends Club or Organization Meetings: 1 to 4 times per year     Marital Status: Never    Housing Stability: Low Risk  (2/27/2024)    Housing Stability Vital Sign     Unable to Pay for Housing in the Last Year: No     Number of Places Lived in the Last Year: 1     Unstable Housing in the Last Year: No     HOME MEDICATIONS     Prior to Admission medications    Medication Sig Start Date End Date Taking? Authorizing Provider   albuterol (PROVENTIL) 2.5 mg /3 mL (0.083 %) nebulizer solution Inhale 2.5 mg into the lungs. 12/8/21 9/28/23  Provider, Historical   ALKALOL NASAL WASH Soln 50 mLs by Nasal route once daily. 8/15/22   Provider, Historical   ascorbic acid, vitamin C, (VITAMIN C) 500 MG tablet Take 1 tablet (500 mg total) by mouth once daily. 9/28/23   Jeanette Mann MD   aspirin (ECOTRIN) 81 MG EC tablet Take 1 tablet (81 mg total) by mouth once daily. 9/28/23 9/27/24  Jeanette Mann MD   bisacodyL (DULCOLAX) 10 mg Supp Place 1 suppository (10 mg total) rectally daily as needed (Until bowel movement if patient has no bowel movement for 2 days). 9/27/23   Jeanette Mann MD   budesonide (PULMICORT) 0.5 mg/2 mL nebulizer solution Take 2 mLs (0.5 mg total) by nebulization daily as needed  (Wheezing, SOB). Controller 9/27/23 9/26/24  Jeanette Mann MD   carvediloL (COREG) 12.5 MG tablet Take 1 tablet (12.5 mg total) by mouth 2 (two) times daily with meals. 9/27/23 9/26/24  Jeanette Mann MD   collagenase (SANTYL) ointment Apply topically once daily.  Patient not taking: Reported on 9/28/2023 5/25/23   Syl Avila MD   doxycycline (VIBRAMYCIN) 100 MG Cap Take 1 capsule (100 mg total) by mouth 2 (two) times daily. for 10 days 2/20/24 3/1/24  Ronald Barcenas MD   ferrous sulfate 325 (65 FE) MG EC tablet Take 1 tablet (325 mg total) by mouth once daily. 9/27/23   Jeanette Mann MD   insulin detemir U-100 (LEVEMIR) 100 unit/mL injection Inject 7 Units into the skin every evening.  Patient not taking: Reported on 9/28/2023 9/27/23 9/26/24  Jeanette Mann MD   JANUVIA 50 mg Tab Take 100 mg by mouth once daily. 5/4/22   Provider, Historical   polyethylene glycol (GLYCOLAX) 17 gram PwPk Take 17 g by mouth 3 (three) times daily as needed (nausea vomiting).  Patient not taking: Reported on 9/28/2023 9/27/23   Jeanette Mann MD     ALLERGIES   Patient has no known allergies.    REVIEW OF SYSTEMS   Except as documented above, all other systems reviewed and negative    PHYSICAL EXAM     Vitals:    03/02/24 1135   BP: 108/71   Pulse: 91   Resp:    Temp: 98.2 °F (36.8 °C)        General:  In no acute distress, resting comfortably  Head and neck:  Atraumatic, normocephalic, moist mucous membranes  Chest:  Clear to auscultation bilaterally  Heart:  S1, S2, no appreciable murmur  Abdomen:  Soft, nontender, BS +  MSK:  Warm, quadriplegic   Neuro:  Alert and oriented x4, quadriplegic   Integumentary:  See media for wound images   Psychiatry:  Appropriate mood and affect    ASSESSMENT AND PLAN   Nonhealing chronic wound   VRE, Proteus  S/p debridement with Dr. Jean on 02/27/2024  Tissue cultures yielded VRE sensitive to linezolid, tigecycline  Proteus sensitive to ceftriaxone   Will  need 6 weeks of treatment given exposed bone starting 3/2-4/12    Post op anemia   H&H improved s/p 2u pRBC 02/28/2024   Bleeding noted to wound, will give an addition 1u pRBC today   No anticoagulation at this time - aspirin d/c 02/28/2024     Hyperglycemia in setting of DM  A1c 02/23/2024 8.5%  Reports taking 80u Levemir BID  Continue home Sitagliptin  Continue detemir 10u q.a.m., Asaprt 5u TIDWM, and moderate dose SSI   Glucose levels were not documented throughout most of the day yesterday, therefore will monitor glucose today before further titration current insulin regimen     Hypotension - resolved      Hx of: Neurogenic bladder, Quadriplegia     DVT prophylaxis:  SCDs, history of bleeding in the recent past  __________________________________________________________________  LABS/MICRO/MEDS/DIAGNOSTICS     LABS  Recent Labs     03/02/24  0541      K 4.9   CHLORIDE 104   CO2 21*   BUN 19.9   CREATININE 0.92   GLUCOSE 250*   CALCIUM 8.3*     Recent Labs     03/02/24  0541   WBC 8.99   RBC 4.08*   HCT 35.1*   MCV 86.0        MICROBIOLOGY  Microbiology Results (last 7 days)       ** No results found for the last 168 hours. **          MEDICATIONS   acetaminophen  650 mg Oral QHS    ascorbic acid (vitamin C)  500 mg Oral Daily    cefTRIAXone (Rocephin) IV (PEDS and ADULTS)  2 g Intravenous Q24H    cetirizine  10 mg Oral Daily    collagenase   Topical (Top) Daily    ferrous sulfate  1 tablet Oral Daily    insulin aspart U-100  5 Units Subcutaneous TIDWM    insulin detemir U-100  10 Units Subcutaneous Daily    Lactobacillus acidophilus  1 capsule Oral TID WM    linezolid  600 mg Oral Q12H    miconazole NITRATE 2 %   Topical (Top) BID    pantoprazole  40 mg Oral Daily    SITagliptin phosphate  100 mg Oral Daily    sodium chloride 0.9%  500 mL Intravenous Once    sodium chloride 0.9%  10 mL Intravenous Q6H      INFUSIONS    DIAGNOSTIC TESTS  X-Ray Chest 1 View   Final Result      Left PICC tip overlies  the [facility].         Electronically signed by: Oj Solomon   Date:    02/28/2024   Time:    14:29         Patient information was obtained from patient, patient's family, past medical records and ER records.   All diagnosis and differential diagnosis have been reviewed; assessment and plan has been documented. I have personally reviewed the labs and test results that are presently available; I have reviewed the patients medication list. I have reviewed the consulting providers response and recommendations. I have reviewed or attempted to review medical records based upon their availability.  All of the patient's questions have been addressed and answered. Patient's is agreeable to the above stated plan. I will continue to monitor closely and make adjustments to medical management as needed.  This note was created using [employer] voice recognition software that occasionally misinterpreted phrases or words.  Please contact me if any questions may rise regarding documentation to clarify verbiage.      Thairy G Reyes,    Internal Medicine  Department of [facility] Medicine  [facility]

## 2024-03-03 LAB
POCT GLUCOSE: 289 MG/DL (ref 70–110)
POCT GLUCOSE: 314 MG/DL (ref 70–110)
POCT GLUCOSE: 317 MG/DL (ref 70–110)
POCT GLUCOSE: 320 MG/DL (ref 70–110)
RBCS: NORMAL

## 2024-03-03 PROCEDURE — 63600175 PHARM REV CODE 636 W HCPCS: Performed by: STUDENT IN AN ORGANIZED HEALTH CARE EDUCATION/TRAINING PROGRAM

## 2024-03-03 PROCEDURE — 25000003 PHARM REV CODE 250: Performed by: STUDENT IN AN ORGANIZED HEALTH CARE EDUCATION/TRAINING PROGRAM

## 2024-03-03 PROCEDURE — 94760 N-INVAS EAR/PLS OXIMETRY 1: CPT

## 2024-03-03 PROCEDURE — 25000003 PHARM REV CODE 250: Performed by: PHYSICIAN ASSISTANT

## 2024-03-03 PROCEDURE — 11000004 HC SNF PRIVATE

## 2024-03-03 PROCEDURE — 63600175 PHARM REV CODE 636 W HCPCS: Performed by: PHYSICIAN ASSISTANT

## 2024-03-03 PROCEDURE — A4216 STERILE WATER/SALINE, 10 ML: HCPCS | Performed by: INTERNAL MEDICINE

## 2024-03-03 PROCEDURE — 99900035 HC TECH TIME PER 15 MIN (STAT)

## 2024-03-03 PROCEDURE — 27000207 HC ISOLATION

## 2024-03-03 PROCEDURE — 25000003 PHARM REV CODE 250: Performed by: INTERNAL MEDICINE

## 2024-03-03 RX ORDER — INSULIN ASPART 100 [IU]/ML
10 INJECTION, SOLUTION INTRAVENOUS; SUBCUTANEOUS
Status: DISCONTINUED | OUTPATIENT
Start: 2024-03-03 | End: 2024-03-03

## 2024-03-03 RX ORDER — INSULIN ASPART 100 [IU]/ML
15 INJECTION, SOLUTION INTRAVENOUS; SUBCUTANEOUS
Status: DISCONTINUED | OUTPATIENT
Start: 2024-03-04 | End: 2024-03-13

## 2024-03-03 RX ADMIN — INSULIN DETEMIR 10 UNITS: 100 INJECTION, SOLUTION SUBCUTANEOUS at 09:03

## 2024-03-03 RX ADMIN — HYDROCODONE BITARTRATE AND ACETAMINOPHEN 1 TABLET: 5; 325 TABLET ORAL at 02:03

## 2024-03-03 RX ADMIN — Medication 1 CAPSULE: at 11:03

## 2024-03-03 RX ADMIN — ACETAMINOPHEN 650 MG: 325 TABLET ORAL at 08:03

## 2024-03-03 RX ADMIN — COLLAGENASE SANTYL: 250 OINTMENT TOPICAL at 09:03

## 2024-03-03 RX ADMIN — SODIUM CHLORIDE, PRESERVATIVE FREE 10 ML: 5 INJECTION INTRAVENOUS at 11:03

## 2024-03-03 RX ADMIN — SODIUM CHLORIDE, PRESERVATIVE FREE 10 ML: 5 INJECTION INTRAVENOUS at 12:03

## 2024-03-03 RX ADMIN — CETIRIZINE HYDROCHLORIDE 10 MG: 10 TABLET, FILM COATED ORAL at 09:03

## 2024-03-03 RX ADMIN — LINEZOLID 600 MG: 600 TABLET, FILM COATED ORAL at 08:03

## 2024-03-03 RX ADMIN — INSULIN DETEMIR 15 UNITS: 100 INJECTION, SOLUTION SUBCUTANEOUS at 08:03

## 2024-03-03 RX ADMIN — Medication 6 MG: at 08:03

## 2024-03-03 RX ADMIN — FERROUS SULFATE TAB 325 MG (65 MG ELEMENTAL FE) 1 EACH: 325 (65 FE) TAB at 09:03

## 2024-03-03 RX ADMIN — LINEZOLID 600 MG: 600 TABLET, FILM COATED ORAL at 09:03

## 2024-03-03 RX ADMIN — MICONAZOLE NITRATE 2 % TOPICAL POWDER: at 09:03

## 2024-03-03 RX ADMIN — SITAGLIPTIN 100 MG: 50 TABLET, FILM COATED ORAL at 09:03

## 2024-03-03 RX ADMIN — PANTOPRAZOLE SODIUM 40 MG: 40 TABLET, DELAYED RELEASE ORAL at 09:03

## 2024-03-03 RX ADMIN — SODIUM CHLORIDE, PRESERVATIVE FREE 10 ML: 5 INJECTION INTRAVENOUS at 05:03

## 2024-03-03 RX ADMIN — Medication 1 CAPSULE: at 07:03

## 2024-03-03 RX ADMIN — INSULIN ASPART 6 UNITS: 100 INJECTION, SOLUTION INTRAVENOUS; SUBCUTANEOUS at 05:03

## 2024-03-03 RX ADMIN — INSULIN ASPART 10 UNITS: 100 INJECTION, SOLUTION INTRAVENOUS; SUBCUTANEOUS at 04:03

## 2024-03-03 RX ADMIN — INSULIN ASPART 5 UNITS: 100 INJECTION, SOLUTION INTRAVENOUS; SUBCUTANEOUS at 09:03

## 2024-03-03 RX ADMIN — SODIUM CHLORIDE: 9 INJECTION, SOLUTION INTRAVENOUS at 03:03

## 2024-03-03 RX ADMIN — Medication 1 CAPSULE: at 04:03

## 2024-03-03 RX ADMIN — INSULIN ASPART 10 UNITS: 100 INJECTION, SOLUTION INTRAVENOUS; SUBCUTANEOUS at 11:03

## 2024-03-03 RX ADMIN — INSULIN ASPART 4 UNITS: 100 INJECTION, SOLUTION INTRAVENOUS; SUBCUTANEOUS at 08:03

## 2024-03-03 RX ADMIN — Medication 10 ML: at 11:03

## 2024-03-03 RX ADMIN — MICONAZOLE NITRATE 2 % TOPICAL POWDER: at 08:03

## 2024-03-03 RX ADMIN — Medication 10 ML: at 12:03

## 2024-03-03 RX ADMIN — CEFTRIAXONE SODIUM 2 G: 2 INJECTION, POWDER, FOR SOLUTION INTRAMUSCULAR; INTRAVENOUS at 03:03

## 2024-03-03 RX ADMIN — OXYCODONE HYDROCHLORIDE AND ACETAMINOPHEN 500 MG: 500 TABLET ORAL at 09:03

## 2024-03-03 RX ADMIN — Medication 10 ML: at 05:03

## 2024-03-03 NOTE — PROGRESS NOTES
[facility]  [facility] MEDICINE ~ PROGRESS NOTE  CHIEF COMPLAINT   Hospital follow up for nonhealing wound    HOSPITAL COURSE   58-year-old man with quadriplegic spinal paralysis and numerous decubitus ulcers who is followed by wound care is brought in today due to fever. He had wound cultures done 3 days ago that have grown Proteus mirabilis and Klebsiella pneumoniae. Sensitivities are not complete. The wounds were noted to have significant odor and heavy green drainage. In addition the patient had some nausea and vomiting and home health noted that his blood pressure was dipping. He was advised to come to the emergency room. Wound cultures were drawn from the right hip wound. His wife reports that when the wound is pressed above pus comes out. Both Klebsiella and Proteus maybe treated with fluoroquinolones which are both IV and oral. The patient's wife preferred if the patient came home so that she could attend to his wounds however the patient expressed a desire to be admitted.     Today:  Tissue culture yesterday resulted with VRE, antibiotics modified  OBJECTIVE/PHYSICAL EXAM     VITAL SIGNS (MOST RECENT):  Temp: 98.2 °F (36.8 °C) (03/03/24 0657)  Pulse: 69 (03/03/24 0700)  Resp: 18 (03/03/24 0700)  BP: 128/86 (03/03/24 0657)  SpO2: 96 % (03/03/24 0700) VITAL SIGNS (24 HOUR RANGE):  Temp:  [97.7 °F (36.5 °C)-98.5 °F (36.9 °C)] 98.2 °F (36.8 °C)  Pulse:  [69-94] 69  Resp:  [18] 18  SpO2:  [93 %-98 %] 96 %  BP: (108-177)/(71-90) 128/86   GENERAL: In no acute distress, afebrile  HEENT:  PERRLA  CHEST: Clear to auscultation bilaterally  HEART: S1, S2, no appreciable murmur  ABDOMEN: Soft, nontender, BS +  MSK: Warm, no lower extremity edema, no clubbing or cyanosis  NEUROLOGIC: Alert and oriented x4  INTEGUMENTARY:  See media for wounds  ASSESSMENT/PLAN   Nonhealing chronic wound   VRE, Proteus  S/p debridement with Dr. Jean on 02/27/2024  Tissue cultures yielded VRE sensitive to  linezolid, tigecycline  Proteus sensitive to ceftriaxone   Will need 6 weeks of treatment given exposed bone starting 3/2-4/12     Post op anemia   H&H improved s/p 2u pRBC 02/28/2024   Bleeding noted to wound, will give an addition 1u pRBC today   No anticoagulation at this time - aspirin d/c 02/28/2024     Hyperglycemia in setting of DM  A1c 02/23/2024 8.5%  Reports taking 80u Levemir BID  Continue home Sitagliptin  Continue to titrate long and short-acting insulin     Hypotension - resolved      Hx of: Neurogenic bladder, Quadriplegia     DVT prophylaxis:  SCDs, history of bleeding in the recent past  Anticipated discharge and disposition:  Can potentially consider home antibiotics however many limiting factors including wound VAC and regular wound care  __________________________________________________________________________    LABS/MICRO/MEDS/DIAGNOSTICS       LABS  Recent Labs     03/02/24  0541      K 4.9   CHLORIDE 104   CO2 21*   BUN 19.9   CREATININE 0.92   GLUCOSE 250*   CALCIUM 8.3*     Recent Labs     03/02/24  0541   WBC 8.99   RBC 4.08*   HCT 35.1*   MCV 86.0          MICROBIOLOGY  Microbiology Results (last 7 days)       ** No results found for the last 168 hours. **               MEDICATIONS   acetaminophen  650 mg Oral QHS    ascorbic acid (vitamin C)  500 mg Oral Daily    cefTRIAXone (Rocephin) IV (PEDS and ADULTS)  2 g Intravenous Q24H    cetirizine  10 mg Oral Daily    collagenase   Topical (Top) Daily    ferrous sulfate  1 tablet Oral Daily    insulin aspart U-100  10 Units Subcutaneous TIDWM    insulin detemir U-100  10 Units Subcutaneous BID    Lactobacillus acidophilus  1 capsule Oral TID WM    linezolid  600 mg Oral Q12H    miconazole NITRATE 2 %   Topical (Top) BID    pantoprazole  40 mg Oral Daily    SITagliptin phosphate  100 mg Oral Daily    sodium chloride 0.9%  500 mL Intravenous Once    sodium chloride 0.9%  10 mL Intravenous Q6H         INFUSIONS         DIAGNOSTIC  TESTS  X-Ray Chest 1 View   Final Result      Left PICC tip overlies the mid SVC.         Electronically signed by: Oj Solomon   Date:    02/28/2024   Time:    14:29           EF   Date Value Ref Range Status   05/09/2023 60 % Final   07/18/2022 40 % Final          NUTRITION STATUS  Patient meets ASPEN criteria for   malnutrition of   per RD assessment as evidenced by:                       A minimum of two characteristics is recommended for diagnosis of either severe or non-severe malnutrition.       Case related differential diagnoses have been reviewed; assessment and plan has been documented. I have personally reviewed the labs and test results that are currently available; I have reviewed the patients medication list. I have reviewed the consulting providers recommendations. I have reviewed or attempted to review medical records based upon their availability.  All of the patient's and/or family's questions have been addressed and answered to the best of my ability.  I will continue to monitor closely and make adjustments to medical management as needed.  This document was created using M*Modal Fluency Direct.  Transcription errors may have been made.  Please contact me if any questions may rise regarding documentation to clarify transcription.        Thairy G Reyes,    Internal Medicine  Department of Hospital Medicine [facility]

## 2024-03-03 NOTE — PLAN OF CARE
Problem: Adult Inpatient Plan of Care  Goal: Plan of Care Review  Outcome: Ongoing, Progressing  Goal: Patient-Specific Goal (Individualized)  Outcome: Ongoing, Progressing  Flowsheets (Taken 3/3/2024 1421)  Anxieties, Fears or Concerns: concern about switching wound care products  Individualized Care Needs: wound care, pain management, abx therapy, monitor for s/s of infection, glucose montoring, turn Q2  Goal: Absence of Hospital-Acquired Illness or Injury  Outcome: Ongoing, Progressing  Intervention: Identify and Manage Fall Risk  Flowsheets (Taken 3/3/2024 1421)  Safety Promotion/Fall Prevention:   assistive device/personal item within reach   bed alarm set   nonskid shoes/socks when out of bed   side rails raised x 2  Goal: Optimal Comfort and Wellbeing  Outcome: Ongoing, Progressing  Goal: Readiness for Transition of Care  Outcome: Ongoing, Progressing     Problem: Diabetes Comorbidity  Goal: Blood Glucose Level Within Targeted Range  Outcome: Ongoing, Progressing  Intervention: Monitor and Manage Glycemia  Flowsheets (Taken 3/3/2024 1421)  Glycemic Management: blood glucose monitored     Problem: Skin Injury Risk Increased  Goal: Skin Health and Integrity  Outcome: Ongoing, Progressing     Problem: Impaired Wound Healing  Goal: Optimal Wound Healing  Outcome: Ongoing, Progressing     Problem: Fall Injury Risk  Goal: Absence of Fall and Fall-Related Injury  Outcome: Ongoing, Progressing  Intervention: Identify and Manage Contributors  Flowsheets (Taken 3/3/2024 1421)  Self-Care Promotion:   independence encouraged   safe use of adaptive equipment encouraged   BADL personal objects within reach  Medication Review/Management: medications reviewed  Intervention: Promote Injury-Free Environment  Flowsheets (Taken 3/3/2024 1421)  Safety Promotion/Fall Prevention:   assistive device/personal item within reach   bed alarm set   nonskid shoes/socks when out of bed   side rails raised x 2     Problem:  Infection  Goal: Absence of Infection Signs and Symptoms  Outcome: Ongoing, Progressing  Intervention: Prevent or Manage Infection  Flowsheets (Taken 3/3/2024 1421)  Infection Management: aseptic technique maintained  Isolation Precautions: protective

## 2024-03-03 NOTE — PLAN OF CARE
"  Problem: Adult Inpatient Plan of Care  Goal: Plan of Care Review  Outcome: Ongoing, Progressing  Flowsheets (Taken 3/2/2024 2145)  Plan of Care Reviewed With:   patient   family  Goal: Patient-Specific Goal (Individualized)  Outcome: Ongoing, Progressing  Flowsheets (Taken 3/2/2024 2145)  Anxieties, Fears or Concerns: Wound care and which product would be best for clearing infection.Would prefer to "use Dakin's solution".  Individualized Care Needs:   Wound care/management as ordered   Antibiotic therapy   Monitor for s/s of worsening infection   Monitor blood glucose levels   Turn Q2 hours.  Goal: Absence of Hospital-Acquired Illness or Injury  Outcome: Ongoing, Progressing  Intervention: Identify and Manage Fall Risk  Flowsheets (Taken 3/2/2024 2145)  Safety Promotion/Fall Prevention:   assistive device/personal item within reach   Fall Risk reviewed with patient/family   Fall Risk signage in place   family to remain at bedside   medications reviewed   lighting adjusted   room near unit station  Intervention: Prevent Skin Injury  Flowsheets (Taken 3/2/2024 2145)  Body Position:   heels elevated   neutral body alignment   tilted   left  Skin Protection:   adhesive use limited   incontinence pads utilized   skin sealant/moisture barrier applied   tubing/devices free from skin contact  Intervention: Prevent and Manage VTE (Venous Thromboembolism) Risk  Flowsheets (Taken 3/2/2024 2145)  Activity Management: Rolling - L1  VTE Prevention/Management: (On ASA daily)   bleeding risk assessed   bleeding precations maintained   dorsiflexion/plantar flexion performed   ROM (active) performed   fluids promoted  Intervention: Prevent Infection  Flowsheets (Taken 3/2/2024 2145)  Infection Prevention:   cohorting utilized   environmental surveillance performed   equipment surfaces disinfected   hand hygiene promoted   personal protective equipment utilized   rest/sleep promoted   single patient room provided  Goal: Optimal " Comfort and Wellbeing  Outcome: Ongoing, Progressing  Intervention: Monitor Pain and Promote Comfort  Flowsheets (Taken 3/2/2024 2145)  Pain Management Interventions:   pain management plan reviewed with patient/caregiver   medication offered   care clustered   position adjusted   quiet environment facilitated   pillow support provided  Intervention: Provide Person-Centered Care  Flowsheets (Taken 3/2/2024 2145)  Trust Relationship/Rapport:   care explained   choices provided   emotional support provided   empathic listening provided   questions answered   questions encouraged   reassurance provided   thoughts/feelings acknowledged     Problem: Diabetes Comorbidity  Goal: Blood Glucose Level Within Targeted Range  Outcome: Ongoing, Progressing  Intervention: Monitor and Manage Glycemia  Flowsheets (Taken 3/2/2024 2145)  Glycemic Management:   blood glucose monitored   supplemental insulin given     Problem: Skin Injury Risk Increased  Goal: Skin Health and Integrity  Outcome: Ongoing, Progressing  Intervention: Optimize Skin Protection  Flowsheets (Taken 3/2/2024 2145)  Pressure Reduction Techniques:   weight shift assistance provided   pressure points protected   heels elevated off bed  Pressure Reduction Devices:   positioning supports utilized   specialty bed utilized  Skin Protection:   adhesive use limited   incontinence pads utilized   skin sealant/moisture barrier applied   tubing/devices free from skin contact  Head of Bed (HOB) Positioning: HOB at 20-30 degrees  Intervention: Promote and Optimize Oral Intake  Flowsheets (Taken 3/2/2024 2145)  Oral Nutrition Promotion:   calorie-dense foods provided   calorie-dense liquids provided     Problem: Impaired Wound Healing  Goal: Optimal Wound Healing  Outcome: Ongoing, Progressing  Intervention: Promote Wound Healing  Flowsheets (Taken 3/2/2024 2145)  Oral Nutrition Promotion:   calorie-dense foods provided   calorie-dense liquids provided  Sleep/Rest  Enhancement:   regular sleep/rest pattern promoted   room darkened   noise level reduced   awakenings minimized  Activity Management: Rolling - L1  Pain Management Interventions:   pain management plan reviewed with patient/caregiver   medication offered   care clustered   position adjusted   quiet environment facilitated   pillow support provided     Problem: Fall Injury Risk  Goal: Absence of Fall and Fall-Related Injury  Outcome: Ongoing, Progressing  Intervention: Identify and Manage Contributors  Flowsheets (Taken 3/2/2024 2145)  Medication Review/Management: medications reviewed  Intervention: Promote Injury-Free Environment  Flowsheets (Taken 3/2/2024 2145)  Safety Promotion/Fall Prevention:   assistive device/personal item within reach   Fall Risk reviewed with patient/family   Fall Risk signage in place   family to remain at bedside   medications reviewed   lighting adjusted   room near unit station     Problem: Infection  Goal: Absence of Infection Signs and Symptoms  Outcome: Ongoing, Progressing  Intervention: Prevent or Manage Infection  Flowsheets (Taken 3/2/2024 2145)  Fever Reduction/Comfort Measures:   lightweight bedding   lightweight clothing  Infection Management: aseptic technique maintained  Isolation Precautions:   contact   precautions maintained

## 2024-03-04 LAB
ANION GAP SERPL CALC-SCNC: 13 MEQ/L
BASOPHILS # BLD AUTO: 0.04 X10(3)/MCL
BASOPHILS NFR BLD AUTO: 0.4 %
BUN SERPL-MCNC: 25 MG/DL (ref 8.4–25.7)
CALCIUM SERPL-MCNC: 8.2 MG/DL (ref 8.4–10.2)
CHLORIDE SERPL-SCNC: 105 MMOL/L (ref 98–107)
CO2 SERPL-SCNC: 21 MMOL/L (ref 22–29)
CREAT SERPL-MCNC: 0.98 MG/DL (ref 0.73–1.18)
CREAT/UREA NIT SERPL: 26
EOSINOPHIL # BLD AUTO: 0.49 X10(3)/MCL (ref 0–0.9)
EOSINOPHIL NFR BLD AUTO: 5.4 %
ERYTHROCYTE [DISTWIDTH] IN BLOOD BY AUTOMATED COUNT: 16.2 % (ref 11.5–17)
GFR SERPLBLD CREATININE-BSD FMLA CKD-EPI: >60 MLS/MIN/1.73/M2
GLUCOSE SERPL-MCNC: 396 MG/DL (ref 74–100)
HCT VFR BLD AUTO: 36.2 % (ref 42–52)
HGB BLD-MCNC: 11.4 G/DL (ref 14–18)
IMM GRANULOCYTES # BLD AUTO: 0.04 X10(3)/MCL (ref 0–0.04)
IMM GRANULOCYTES NFR BLD AUTO: 0.4 %
LYMPHOCYTES # BLD AUTO: 2.05 X10(3)/MCL (ref 0.6–4.6)
LYMPHOCYTES NFR BLD AUTO: 22.7 %
MAGNESIUM SERPL-MCNC: 1.8 MG/DL (ref 1.6–2.6)
MCH RBC QN AUTO: 27.1 PG (ref 27–31)
MCHC RBC AUTO-ENTMCNC: 31.5 G/DL (ref 33–36)
MCV RBC AUTO: 86 FL (ref 80–94)
MONOCYTES # BLD AUTO: 0.52 X10(3)/MCL (ref 0.1–1.3)
MONOCYTES NFR BLD AUTO: 5.8 %
NEUTROPHILS # BLD AUTO: 5.9 X10(3)/MCL (ref 2.1–9.2)
NEUTROPHILS NFR BLD AUTO: 65.3 %
PLATELET # BLD AUTO: 343 X10(3)/MCL (ref 130–400)
PMV BLD AUTO: 9.9 FL (ref 7.4–10.4)
POCT GLUCOSE: 146 MG/DL (ref 70–110)
POCT GLUCOSE: 169 MG/DL (ref 70–110)
POCT GLUCOSE: 238 MG/DL (ref 70–110)
POCT GLUCOSE: 359 MG/DL (ref 70–110)
POTASSIUM SERPL-SCNC: 4.6 MMOL/L (ref 3.5–5.1)
RBC # BLD AUTO: 4.21 X10(6)/MCL (ref 4.7–6.1)
SODIUM SERPL-SCNC: 139 MMOL/L (ref 136–145)
WBC # SPEC AUTO: 9.04 X10(3)/MCL (ref 4.5–11.5)

## 2024-03-04 PROCEDURE — A4216 STERILE WATER/SALINE, 10 ML: HCPCS | Performed by: INTERNAL MEDICINE

## 2024-03-04 PROCEDURE — 97530 THERAPEUTIC ACTIVITIES: CPT

## 2024-03-04 PROCEDURE — 27000207 HC ISOLATION

## 2024-03-04 PROCEDURE — 25000003 PHARM REV CODE 250: Performed by: PHYSICIAN ASSISTANT

## 2024-03-04 PROCEDURE — 83735 ASSAY OF MAGNESIUM: CPT | Performed by: STUDENT IN AN ORGANIZED HEALTH CARE EDUCATION/TRAINING PROGRAM

## 2024-03-04 PROCEDURE — 94760 N-INVAS EAR/PLS OXIMETRY 1: CPT

## 2024-03-04 PROCEDURE — 25000003 PHARM REV CODE 250: Performed by: STUDENT IN AN ORGANIZED HEALTH CARE EDUCATION/TRAINING PROGRAM

## 2024-03-04 PROCEDURE — 80200 ASSAY OF TOBRAMYCIN: CPT | Performed by: STUDENT IN AN ORGANIZED HEALTH CARE EDUCATION/TRAINING PROGRAM

## 2024-03-04 PROCEDURE — 97110 THERAPEUTIC EXERCISES: CPT

## 2024-03-04 PROCEDURE — 63600175 PHARM REV CODE 636 W HCPCS: Performed by: STUDENT IN AN ORGANIZED HEALTH CARE EDUCATION/TRAINING PROGRAM

## 2024-03-04 PROCEDURE — 85025 COMPLETE CBC W/AUTO DIFF WBC: CPT | Performed by: STUDENT IN AN ORGANIZED HEALTH CARE EDUCATION/TRAINING PROGRAM

## 2024-03-04 PROCEDURE — 99900035 HC TECH TIME PER 15 MIN (STAT)

## 2024-03-04 PROCEDURE — 11000004 HC SNF PRIVATE

## 2024-03-04 PROCEDURE — 80048 BASIC METABOLIC PNL TOTAL CA: CPT | Performed by: STUDENT IN AN ORGANIZED HEALTH CARE EDUCATION/TRAINING PROGRAM

## 2024-03-04 PROCEDURE — 63600175 PHARM REV CODE 636 W HCPCS: Performed by: PHYSICIAN ASSISTANT

## 2024-03-04 PROCEDURE — 25000003 PHARM REV CODE 250: Performed by: INTERNAL MEDICINE

## 2024-03-04 PROCEDURE — 97606 NEG PRS WND THER DME>50 SQCM: CPT

## 2024-03-04 RX ADMIN — PANTOPRAZOLE SODIUM 40 MG: 40 TABLET, DELAYED RELEASE ORAL at 09:03

## 2024-03-04 RX ADMIN — FERROUS SULFATE TAB 325 MG (65 MG ELEMENTAL FE) 1 EACH: 325 (65 FE) TAB at 09:03

## 2024-03-04 RX ADMIN — HYDROCODONE BITARTRATE AND ACETAMINOPHEN 1 TABLET: 5; 325 TABLET ORAL at 11:03

## 2024-03-04 RX ADMIN — MICONAZOLE NITRATE 2 % TOPICAL POWDER: at 09:03

## 2024-03-04 RX ADMIN — INSULIN ASPART 10 UNITS: 100 INJECTION, SOLUTION INTRAVENOUS; SUBCUTANEOUS at 05:03

## 2024-03-04 RX ADMIN — Medication 1 CAPSULE: at 05:03

## 2024-03-04 RX ADMIN — SODIUM CHLORIDE, PRESERVATIVE FREE 10 ML: 5 INJECTION INTRAVENOUS at 05:03

## 2024-03-04 RX ADMIN — LOPERAMIDE HYDROCHLORIDE 2 MG: 2 CAPSULE ORAL at 09:03

## 2024-03-04 RX ADMIN — ACETAMINOPHEN 650 MG: 325 TABLET ORAL at 09:03

## 2024-03-04 RX ADMIN — COLLAGENASE SANTYL: 250 OINTMENT TOPICAL at 09:03

## 2024-03-04 RX ADMIN — SODIUM CHLORIDE, PRESERVATIVE FREE 10 ML: 5 INJECTION INTRAVENOUS at 11:03

## 2024-03-04 RX ADMIN — LINEZOLID 600 MG: 600 TABLET, FILM COATED ORAL at 09:03

## 2024-03-04 RX ADMIN — SODIUM CHLORIDE, PRESERVATIVE FREE 10 ML: 5 INJECTION INTRAVENOUS at 01:03

## 2024-03-04 RX ADMIN — INSULIN ASPART 15 UNITS: 100 INJECTION, SOLUTION INTRAVENOUS; SUBCUTANEOUS at 05:03

## 2024-03-04 RX ADMIN — Medication 10 ML: at 05:03

## 2024-03-04 RX ADMIN — INSULIN DETEMIR 15 UNITS: 100 INJECTION, SOLUTION SUBCUTANEOUS at 09:03

## 2024-03-04 RX ADMIN — Medication 1 CAPSULE: at 01:03

## 2024-03-04 RX ADMIN — SODIUM CHLORIDE: 9 INJECTION, SOLUTION INTRAVENOUS at 09:03

## 2024-03-04 RX ADMIN — CETIRIZINE HYDROCHLORIDE 10 MG: 10 TABLET, FILM COATED ORAL at 09:03

## 2024-03-04 RX ADMIN — Medication 1 CAPSULE: at 07:03

## 2024-03-04 RX ADMIN — SITAGLIPTIN 100 MG: 50 TABLET, FILM COATED ORAL at 09:03

## 2024-03-04 RX ADMIN — TOBRAMYCIN SULFATE 400 MG: 40 INJECTION, SOLUTION INTRAMUSCULAR; INTRAVENOUS at 09:03

## 2024-03-04 RX ADMIN — INSULIN ASPART 15 UNITS: 100 INJECTION, SOLUTION INTRAVENOUS; SUBCUTANEOUS at 01:03

## 2024-03-04 RX ADMIN — OXYCODONE HYDROCHLORIDE AND ACETAMINOPHEN 500 MG: 500 TABLET ORAL at 09:03

## 2024-03-04 RX ADMIN — INSULIN ASPART 15 UNITS: 100 INJECTION, SOLUTION INTRAVENOUS; SUBCUTANEOUS at 09:03

## 2024-03-04 NOTE — PROGRESS NOTES
Inpatient Nutrition Evaluation    Admit Date: 2/26/2024   Total duration of encounter: 7 days   Patient Age: 58 y.o.    Nutrition Recommendation/Prescription     Continue regular diet. 2. Continue Arginaid 1 pk BID for wound healing 3. Continue ascorbic acid 500 mg PO daily 4. Weekly weights 5. Monitor intake, wt, labs, medications.     Nutrition Assessment     Chart Review    Reason Seen: continuous nutrition monitoring, length of stay, and follow-up    Malnutrition Screening Tool Results   Have you recently lost weight without trying?: Yes: 34 lbs or more  Have you been eating poorly because of a decreased appetite?: Yes   MST Score: 5   Diagnosis:  Infected wounds/sacrum, abd, feet  Klebsiella pneumoniae, Pseudomonas aeruginosa, Proteus mirabilis  DM,   Hypotension  Hyperglycemia  VRE  A-fib        Cardiac arrest       7/2022    Chronic skin ulcer      DM (diabetes mellitus)      Infected decubitus ulcer      Lymphedema of leg      Neurogenic bladder      Obesity, unspecified      Pressure ulcer of heel      Pressure ulcer of right foot, stage 3      Pressure ulcer of right ischium      Quadriplegia      Quadriplegic spinal paralysis      Relevant Medical History:     Scheduled Medications:  acetaminophen, 650 mg, QHS  ascorbic acid (vitamin C), 500 mg, Daily  cetirizine, 10 mg, Daily  collagenase, , Daily  ferrous sulfate, 1 tablet, Daily  insulin aspart U-100, 15 Units, TIDWM  insulin detemir U-100, 15 Units, BID  Lactobacillus acidophilus, 1 capsule, TID WM  linezolid, 600 mg, Q12H  miconazole NITRATE 2 %, , BID  pantoprazole, 40 mg, Daily  SITagliptin phosphate, 100 mg, Daily  sodium chloride 0.9%, 500 mL, Once  sodium chloride 0.9%, 10 mL, Q6H  tobramycin IV (PEDS and ADULTS), 400 mg, Q24H    Continuous Infusions:   PRN Medications: 0.9%  NaCl infusion (for blood administration), 0.9%  NaCl infusion (for blood administration), sodium chloride 0.9%, acetaminophen, albuterol, benzonatate, bisacodyL,  "budesonide, calcium carbonate, dextrose 10%, dextrose 10%, diphenhydrAMINE, glucagon (human recombinant), glucose, glucose, hydrALAZINE, HYDROcodone-acetaminophen, hydrOXYzine pamoate, insulin aspart U-100, loperamide, melatonin, metoprolol, ondansetron, simethicone, Flushing PICC/Midline Protocol **AND** sodium chloride 0.9% **AND** sodium chloride 0.9%, zolpidem    Recent Labs   Lab 02/28/24  0454 02/29/24  0519 03/01/24  0505 03/02/24  0541 03/04/24  0525   * 136 133* 136 139   K 5.1 4.5 4.9 4.9 4.6   CALCIUM 8.1* 8.0* 8.3* 8.3* 8.2*   MG  --   --   --   --  1.80   CHLORIDE 101 106 104 104 105   CO2 19* 19* 20* 21* 21*   BUN 23.7 20.4 19.0 19.9 25.0   CREATININE 1.20* 1.01 0.87 0.92 0.98   EGFRNORACEVR >60 >60 >60 >60 >60   GLUCOSE 334* 359* 275* 250* 396*   BILITOT 0.2  --   --   --   --    ALKPHOS 113  --   --   --   --    ALT 14  --   --   --   --    AST 7  --   --   --   --    ALBUMIN 1.6*  --   --   --   --    PREALB 7.4*  --   --   --   --    WBC 8.50 7.52 8.90 8.99 9.04   HGB 6.6* 8.9* 10.7* 11.1* 11.4*   HCT 22.7* 27.7* 33.6* 35.1* 36.2*     Nutrition Orders:  Diet Adult Regular  Dietary nutrition supplements Arginaid - Any flavor; BID    Appetite/Oral Intake: good/% of meals  Factors Affecting Nutritional Intake:  multiple wound, sacral spine to exposed bone, tendon, and muscle  Food/Yazidism/Cultural Preferences: none reported  Food Allergies: none reported  Last Bowel Movement: 03/03/24  Wound(s):     Altered Skin Integrity 01/12/23 1530 Sacral spine Full thickness tissue loss with exposed bone, tendon, or muscle. Often includes undermining and tunneling. May extend into muscle and/or supporting structures.-Tissue loss description: Full thickness     Comments    Wound care nurse assessing patient. Will try again. Reviewed intake 100%. Pt on arginaid and ascorbic acid for wound healing.     Anthropometrics    Height: 5' 7" (170.2 cm), Height Method: Stated  Last Weight: 95 kg (209 lb 7 oz) " (03/04/24 0500), Weight Method: Bed Scale  BMI (Calculated): 32.8  BMI Classification: obese grade I (BMI 30-34.9)        Ideal Body Weight (IBW), Male: 148 lb     % Ideal Body Weight, Male (lb): 139.86 %                          Usual Weight Provided By: unable to obtain usual weight    Wt Readings from Last 5 Encounters:   03/04/24 95 kg (209 lb 7 oz)   02/27/24 93.9 kg (207 lb 0.2 oz)   02/23/24 93.9 kg (207 lb)   08/16/23 106.4 kg (234 lb 9.6 oz)   08/15/23 113.4 kg (250 lb)     Weight Change(s) Since Admission: +1.1 kg wt gained  Wt Readings from Last 1 Encounters:   03/04/24 0500 95 kg (209 lb 7 oz)   02/26/24 1500 93.9 kg (207 lb)   Admit Weight: 93.9 kg (207 lb) (02/26/24 1500), Weight Method: Bed Scale (Used bed weight from admission 2/23/24 as his bed does not have a scale)    Patient Education     Not applicable.    Nutrition Goals & Monitoring     Dietitian will monitor: food and beverage intake, weight, weight change, electrolyte/renal panel, glucose/endocrine profile, and gastrointestinal profile    Nutrition Risk/Follow-Up: low (follow-up in 5-7 days)  Patients assigned 'low nutrition risk' status do not qualify for a full nutritional assessment but will be monitored and re-evaluated in a 5-7 day time period. Please consult if re-evaluation needed sooner.

## 2024-03-04 NOTE — PT/OT/SLP PROGRESS
Physical Therapy Treatment Note           Name: Antonio Galvan II    : 1965 (58 y.o.)  MRN: [id number]           TREATMENT SUMMARY AND RECOMMENDATIONS:    PT Received On: 24  PT Start Time: 1506     PT Stop Time: 1540  PT Total Time (min): 34 min     Subjective Assessment:   No complaints  Lethargic   x Awake, alert, cooperative  Uncooperative    Agitated  c/o pain    Appropriate  c/o fatigue    Confused x Treated at bedside     Emotionally labile  Treated in gym/dept.    Impulsive  Other:    Flat affect       Therapy Precautions:    Cognitive deficits  Spinal precautions    Collar - hard  Sternal precautions    Collar - soft   TLSO    Fall risk  LSO    Hip precautions - posterior  Knee immobilizer    Hip precautions - anterior  WBAT    Impaired communication  Partial weightbearing    Oxygen  TTWB    PEG tube  NWB    Visual deficits x Other: Wound Vac, super pubic cath.     Hearing deficits          Treatment Objectives:     Mobility Training:   Assist level Comments    Bed mobility Total A X 2 person assist to position from supine  R side lying with proper pillow support   Transfer     Gait     Sit to stand transitions     Sitting balance     Standing balance      Wheelchair mobility     Car transfer     Other:          Therapeutic Exercise:   Exercise Sets Reps Comments   PROM to B UE and LE all joints  10 All functional planes to maintain and improve mobility to maximize positioning ability to reduce skin break down.                          Additional Comments:  Pt with good tolerance, no complaints. Better mobility at LLE vs R LE. PT to address ROM as tolerated. Will attempt EOB and OOB as wounds improve     Assessment: Patient tolerated session fair.    PT Plan: continue per POC  Revisions made to plan of care: No    GOALS:   Multidisciplinary Problems       Physical Therapy Goals          Problem: Physical Therapy    Goal Priority Disciplines Outcome Goal Variances Interventions    Physical Therapy Goal     PT, PT/OT Ongoing, Progressing     Description: Goals to be met by: Discharge     Pt to be seen for ROM tasks QD to ensure proper positioning can be obtained to overall reduce impacts of prolonged bed rest and skin break down  Progress pending healing stages of current wounds    Patient will increase functional independence with mobility by performin. Pt to tolerate PROM tasks to B UE and LE, while obtaining 25% improvement in range.   2. Sitting EOB to tolerance pending wound healing - with pt demonstrating normal BP  3. Progress to OOB into chair once able.                          Skilled PT Minutes Provided: 25   Communication with Treatment Team:     Equipment recommendations:       At end of treatment, patient remained:   Up in chair     Up in wheelchair in room   x In bed    With alarm activated    Bed rails up   x Call bell in reach    x Family/friends present    Restraints secured properly    In bathroom with CNA/RN notified   x Nurse aware    In gym with therapist/tech    Other:

## 2024-03-04 NOTE — PROGRESS NOTES
Follow up assessment performed.   L posterior ankle ulceration dry with adherent scab to surface.  Both L anterior and R heel ulcerations continue to decrease in size with current dressing change regimen.  Recommend continued use of adaptic, aquacel ag, wrapping with every other day dressing change.   R buttock lateral ulceration noted with no active bleeding today from undermining area.   Continue with bone exposure palpable on superiolateral aspect.  Non viable connective tissue visible , unable to visualize entire undermined area.  Flattened aspect of wound bed exposed continues with epithelial migration from wound edge.  Recommend continued use of Aquacel ag in undermining, collagen to flattened base daily with gauze, abd, medfix as secondary dressing.  Sacral wound vac dressing dislodged on Saturday with dressing change to R buttock.   Recommend nursing make sure not to overlap dressings to prevent dislodgement of wound vac dressing.   Sacral ulceration remains with small amount of cauterization to distal aspect, fibrous material to wound base, granulation tissue beginning to occur on wound edges, periwound denuded skin / excoriation much improved with hydrocolloid periwound use.    Reapplied dressing with black granufoam to wound base, hydrocolloid to periwound, including hydrocolloid ring to perirectal crease, mastisol for additional adhesive.  Seal obtained - -125mmHg continuous with twice weekly dressing changes.  All pressure prevention measures in place with turn q 2 hrs, ALAL specialty mattress, heel lift boots, moisture management.   Discussed continued healing measures with pt and mother at bedside.    03/04/24 1247   WOCN Assessment   WOCN Total Time (mins) 60   Visit Date 03/04/24   Consult Type Follow Up   WOCN Speciality Wound   Wound pressure   Number of Wounds 6   Continence Type Fecal   Procedure wound vac   Intervention assessed   Teaching on-going   Skin Interventions   Device Skin Pressure  Protection absorbent pad utilized/changed;positioning supports utilized   Pressure Reduction Devices alternating pressure pump (ADD);specialty bed utilized;heel offloading device utilized;positioning supports utilized   Pressure Reduction Techniques weight shift assistance provided;positioned off wounds;heels elevated off bed   Skin Protection tubing/devices free from skin contact;incontinence pads utilized   Pressure Injury Prevention    Sacral Foam Dressing Patient incontinent, barrier cream applied   Heel protection technique Heel boot   Heel preventative measures   (wound dressings in place)        Altered Skin Integrity 05/06/22 1140 Right Heel  Full thickness tissue loss. Subcutaneous fat may be visible but bone, tendon or muscle are not exposed   Date First Assessed/Time First Assessed: 05/06/22 1140   Altered Skin Integrity Present on Admission: yes  Side: Right  Location: Heel  Is this injury device related?: No  Primary Wound Type: (c)   Description of Altered Skin Integrity: Full thickness...   Wound Image    Dressing Appearance Intact;Dried drainage   Drainage Amount Small   Drainage Characteristics/Odor Sanguineous   Appearance Red;Granulating;Tan;Black;Dry;Fibrin  (scab)   Tissue loss description Full thickness   Red (%), Wound Tissue Color 100 %   Periwound Area Scar tissue;Dry   Wound Edges Irregular   Wound Length (cm) 1.5 cm   Wound Width (cm) 1 cm   Wound Depth (cm) 0.1 cm   Wound Volume (cm^3) 0.15 cm^3   Wound Surface Area (cm^2) 1.5 cm^2   Care Cleansed with:;Wound cleanser   Dressing Applied;Non-adherent;Hydrofiber;Silver;Gauze;Rolled gauze   Periwound Care Dry periwound area maintained   Off Loading Other (see comments)  (heel lift boot)   Dressing Change Due 03/06/24        Altered Skin Integrity 01/12/23 1530 Sacral spine Full thickness tissue loss with exposed bone, tendon, or muscle. Often includes undermining and tunneling. May extend into muscle and/or supporting structures.   Date  First Assessed/Time First Assessed: 01/12/23 1530   Altered Skin Integrity Present on Admission - Did Patient arrive to the hospital with altered skin?: yes  Location: Sacral spine  Description of Altered Skin Integrity: Full thickness tissue los...   Wound Image    Description of Altered Skin Integrity Full thickness tissue loss with exposed bone, tendon, or muscle. Often includes undermining and tunneling. May extend into muscle and/or supporting structures.   Dressing Appearance Intact;Moist drainage   Drainage Amount Moderate   Drainage Characteristics/Odor Serosanguineous;No odor;Bleeding controlled   Appearance Red;Granulating;Tan;Gray;Yellow;White;Muscle;Other (see comments);Black  (connective tissue, cautery)   Tissue loss description Full thickness   Red (%), Wound Tissue Color 15 %   Yellow (%), Wound Tissue Color 85 %   Periwound Area Moist;Ecchymotic;Denuded   Wound Edges Defined   Wound Length (cm) 9.5 cm   Wound Width (cm) 7.3 cm   Wound Depth (cm) 3.5 cm   Wound Volume (cm^3) 242.725 cm^3   Wound Surface Area (cm^2) 69.35 cm^2   Care Cleansed with:;Antimicrobial agent;Wound cleanser   Dressing Other (comment)  (black granufoam, drape)   Periwound Care Hydrocolloid barrier applied;Dry periwound area maintained;Skin barrier film applied   Dressing Change Due 03/07/24        Altered Skin Integrity 08/01/23 1534 Left posterior Ankle Full thickness tissue loss. Base is covered by slough and/or eschar in the wound bed   Date First Assessed/Time First Assessed: 08/01/23 1534   Altered Skin Integrity Present on Admission - Did Patient arrive to the hospital with altered skin?: yes  Side: Left  Orientation: posterior  Location: Ankle  Description of Altered Skin Integri...   Wound Image    Dressing Appearance Intact;Clean   Drainage Amount None   Appearance Intact;Dry;Tan;Closed/resurfaced  (dry scab to surface)   Periwound Area Scar tissue;Maroon;Purple   Wound Length (cm) 0 cm   Wound Width (cm) 0 cm   Wound  Depth (cm) 0 cm   Wound Volume (cm^3) 0 cm^3   Wound Surface Area (cm^2) 0 cm^2   Care Cleansed with:;Wound cleanser   Dressing Applied;Non-adherent;Hydrofiber;Gauze;Rolled gauze  (adaptic)   Periwound Care Dry periwound area maintained   Off Loading Other (see comments)  (heel lift boot)   Dressing Change Due 03/06/24        Altered Skin Integrity 08/01/23 1535 Left anterior Ankle Other (comment) Full thickness tissue loss. Base is covered by slough and/or eschar in the wound bed   Date First Assessed/Time First Assessed: 08/01/23 1535   Altered Skin Integrity Present on Admission - Did Patient arrive to the hospital with altered skin?: yes  Side: Left  Orientation: anterior  Location: Ankle  Is this injury device related?: No  ...   Wound Image    Dressing Appearance Intact;Dried drainage   Drainage Amount Small   Drainage Characteristics/Odor Serosanguineous;No odor;Bleeding controlled   Appearance Pink;Red;Granulating   Tissue loss description Full thickness   Red (%), Wound Tissue Color 100 %   Periwound Area Intact;Scar tissue;Purple;Maroon   Wound Edges Defined   Wound Length (cm) 0.7 cm   Wound Width (cm) 0.5 cm   Wound Depth (cm) 0.1 cm   Wound Volume (cm^3) 0.035 cm^3   Wound Surface Area (cm^2) 0.35 cm^2   Care Cleansed with:;Wound cleanser   Dressing Applied;Non-adherent;Hydrofiber;Silver;Gauze;Rolled gauze  (adaptic)   Dressing Change Due 03/06/24        Incision/Site 08/16/23 2011 Left Hip lateral   Date First Assessed/Time First Assessed: 08/16/23 2011   Side: Left  Location: Hip  Orientation: lateral   Wound Image    Dressing Appearance Intact;Moist drainage   Drainage Amount Moderate   Drainage Characteristics/Odor Serous;No odor;Bleeding controlled   Red (%), Wound Tissue Color 95 %   Yellow (%), Wound Tissue Color 5 %   Periwound Area Scar tissue;Dry   Wound Edges Defined   Wound Length (cm) 4 cm   Wound Width (cm) 2.5 cm   Wound Depth (cm) 2 cm   Wound Volume (cm^3) 20 cm^3   Wound Surface Area  (cm^2) 10 cm^2   Undermining (depth (cm)/location) 2cm @ 9 o'clock/ undermining from 1 - 11 o'clock   Care Cleansed with:;Antimicrobial agent;Wound cleanser   Dressing Changed;Sodium chloride impregnated;Gauze;Absorptive Pad  (vashe moistened mesalt)   Packing packed with;other (see comment)  (vashe moistened mesalt)   Periwound Care Dry periwound area maintained   Dressing Change Due 03/05/24        Incision/Site 02/27/24 1450 Right Buttocks lateral   Date First Assessed/Time First Assessed: 02/27/24 1450   Side: Right  Location: Buttocks  Orientation: lateral  Additional Comments: mesalt, quick clot, gauze, abdomen pad, and tape   Wound Image     Dressing Appearance Intact;Moist drainage   Drainage Amount Large   Drainage Characteristics/Odor Serosanguineous;No odor;Bleeding controlled   Appearance Pink;Red;Granulating;Tan;Yellow;Gray;Slough;Bone;Other (see comments);Black  (connective tissue)   Red (%), Wound Tissue Color 80 %   Yellow (%), Wound Tissue Color 20 %   Periwound Area Scar tissue;Moist   Wound Edges Defined   Wound Length (cm) 9 cm   Wound Width (cm) 12.5 cm   Wound Depth (cm) 2 cm   Wound Volume (cm^3) 225 cm^3   Wound Surface Area (cm^2) 112.5 cm^2   Undermining (depth (cm)/location) 7cm at 1 o'clock / undermining 12 to 2 o'clock   Care Cleansed with:;Antimicrobial agent;Wound cleanser   Dressing Applied;Collagen;Silver;Hydrofiber;Gauze;Absorptive Pad;Other (comment)  (medfix)   Packing packed with;hydrofiber/alginate  (underming)   Periwound Care Dry periwound area maintained;Skin barrier film applied   Dressing Change Due 03/05/24        Negative Pressure Wound Therapy  02/29/24 1227   Placement Date/Time: 02/29/24 1227   Location: Sacral Spine   NPWT Type Vacuum Therapy   Therapy Setting NPWT Continuous therapy   Pressure Setting NPWT 125 mmHg   Therapy Interventions NPWT Canister changed;Dressing changed;Seal intact   Sponges Inserted NPWT Black;2

## 2024-03-04 NOTE — PLAN OF CARE
[facility]  Discharge Reassessment    Primary Care Provider: Jennifer Fernando NP    Expected Discharge Date:     Reassessment (most recent)       Discharge Reassessment - 03/04/24 1452          Discharge Reassessment    Assessment Type Discharge Planning Reassessment     Did the patient's condition or plan change since previous assessment? No     Discharge Plan discussed with: Patient     Communicated SADE with patient/caregiver Date not available/Unable to determine     Discharge Plan A Home Health;Home with family     DME Needed Upon Discharge  none     Transition of Care Barriers None        Post-Acute Status    Post-Acute Authorization Home Health     Home Health Status Pending medical clearance/testing     Hospital Resources/Appts/Education Provided Provided patient/caregiver with written discharge plan information     Discharge Delays None known at this time

## 2024-03-04 NOTE — PROGRESS NOTES
[facility]  [facility] [place] MEDICINE ~ PROGRESS NOTE  CHIEF COMPLAINT   Hospital follow up for nonhealing wound    HOSPITAL COURSE   58-year-old man with quadriplegic spinal paralysis and numerous decubitus ulcers who is followed by wound care is brought in today due to fever. He had wound cultures done 3 days ago that have grown Proteus mirabilis and Klebsiella pneumoniae. Sensitivities are not complete. The wounds were noted to have significant odor and heavy green drainage. In addition the patient had some nausea and vomiting and home health noted that his blood pressure was dipping. He was advised to come to the emergency room. Wound cultures were drawn from the right hip wound. His wife reports that when the wound is pressed above pus comes out. Both Klebsiella and Proteus maybe treated with fluoroquinolones which are both IV and oral. The patient's wife preferred if the patient came home so that she could attend to his wounds however the patient expressed a desire to be admitted.     Today:  Glucose remains quite elevated, but insulin regimen started today.  Based off culture sensitivity report, patient will be placed back on Tobramycin with Zyvox.    OBJECTIVE/PHYSICAL EXAM     VITAL SIGNS (MOST RECENT):  Temp: 98.4 °F (36.9 °C) (03/04/24 0735)  Pulse: 85 (03/04/24 0735)  Resp: 18 (03/04/24 1141)  BP: (!) 167/92 (03/04/24 0735)  SpO2: 95 % (03/04/24 0735) VITAL SIGNS (24 HOUR RANGE):  Temp:  [97.5 °F (36.4 °C)-98.4 °F (36.9 °C)] 98.4 °F (36.9 °C)  Pulse:  [] 85  Resp:  [18-19] 18  SpO2:  [95 %-99 %] 95 %  BP: (123-167)/(80-93) 167/92     GENERAL: In no acute distress, afebrile  HEENT:  PERRLA  CHEST: Clear to auscultation bilaterally  HEART: S1, S2, no appreciable murmur  ABDOMEN: Soft, nontender, BS +  MSK: Warm, no lower extremity edema, no clubbing or cyanosis  NEUROLOGIC: Alert and oriented x4  INTEGUMENTARY:  See media for wounds    ASSESSMENT/PLAN     Nonhealing chronic  wound   VRE, Proteus, Pseudomonas, Klebsiella  S/p debridement with Dr. Jean on 02/27/2024  Tissue cultures yielded VRE sensitive to linezolid, tigecycline  Proteus sensitive to ceftriaxone   Will need 6 weeks of treatment given exposed bone starting 3/2-4/12  Wound VAC removed this weekend due to seal malfunction     Post op anemia   H&H improved s/p 2u pRBC 02/28/2024   Bleeding noted to wound, will give an addition 1u pRBC today   No anticoagulation at this time - aspirin d/c 02/28/2024     Hyperglycemia in setting of DM  A1c 02/23/2024 8.5%  Reports taking 80u Levemir BID  Continue home Sitagliptin  Continue to titrate long and short-acting insulin     Hypotension - resolved      Hx of: Neurogenic bladder, Quadriplegia     DVT prophylaxis:  SCDs, history of bleeding in the recent past    Anticipated discharge and disposition:  Can potentially consider home antibiotics however many limiting factors including wound VAC and regular wound care  __________________________________________________________________________    LABS/MICRO/MEDS/DIAGNOSTICS     LABS  Recent Labs     03/04/24  0525      K 4.6   CHLORIDE 105   CO2 21*   BUN 25.0   CREATININE 0.98   GLUCOSE 396*   CALCIUM 8.2*     Recent Labs     03/04/24  0525   WBC 9.04   RBC 4.21*   HCT 36.2*   MCV 86.0        MICROBIOLOGY  Microbiology Results (last 7 days)       ** No results found for the last 168 hours. **             MEDICATIONS   acetaminophen  650 mg Oral QHS    ascorbic acid (vitamin C)  500 mg Oral Daily    cetirizine  10 mg Oral Daily    collagenase   Topical (Top) Daily    ferrous sulfate  1 tablet Oral Daily    insulin aspart U-100  15 Units Subcutaneous TIDWM    insulin detemir U-100  15 Units Subcutaneous BID    Lactobacillus acidophilus  1 capsule Oral TID WM    linezolid  600 mg Oral Q12H    miconazole NITRATE 2 %   Topical (Top) BID    pantoprazole  40 mg Oral Daily    SITagliptin phosphate  100 mg Oral Daily    sodium  chloride 0.9%  500 mL Intravenous Once    sodium chloride 0.9%  10 mL Intravenous Q6H    tobramycin IV (PEDS and ADULTS)  400 mg Intravenous Q24H         INFUSIONS       DIAGNOSTIC TESTS  X-Ray Chest 1 View   Final Result      Left PICC tip overlies the mid SVC.         Electronically signed by: Oj Solomon   Date:    02/28/2024   Time:    14:29           EF   Date Value Ref Range Status   05/09/2023 60 % Final   07/18/2022 40 % Final        NUTRITION STATUS  Patient meets ASPEN criteria for   malnutrition of   per RD assessment as evidenced by:                       A minimum of two characteristics is recommended for diagnosis of either severe or non-severe malnutrition.     Case related differential diagnoses have been reviewed; assessment and plan has been documented. I have personally reviewed the labs and test results that are currently available; I have reviewed the patients medication list. I have reviewed the consulting providers recommendations. I have reviewed or attempted to review medical records based upon their availability.  All of the patient's and/or family's questions have been addressed and answered to the best of my ability.  I will continue to monitor closely and make adjustments to medical management as needed.  This document was created using M*Modal Fluency Direct.  Transcription errors may have been made.  Please contact me if any questions may rise regarding documentation to clarify transcription.      ANTHONY Vazquez   Internal Medicine  Department of Hospital Medicine [facility]

## 2024-03-04 NOTE — PLAN OF CARE
Problem: Adult Inpatient Plan of Care  Goal: Plan of Care Review  Outcome: Ongoing, Progressing  Flowsheets (Taken 3/3/2024 2030)  Plan of Care Reviewed With:   patient   family  Goal: Patient-Specific Goal (Individualized)  Outcome: Ongoing, Progressing  Flowsheets (Taken 3/3/2024 2030)  Anxieties, Fears or Concerns: Had headache and bowel incontinence, feels better, resolving.  Individualized Care Needs:   Anticipate patient needs, frequent rounding   Pain management   Monitor for s/s of worsening infection   Monitor blood glucose levels, encourage to adhere to diet for glycemic control and to promote wound healing   Turn Q2 hrs   Wound care/management as ordered   Comfort/supportive care.  Goal: Absence of Hospital-Acquired Illness or Injury  Outcome: Ongoing, Progressing  Intervention: Identify and Manage Fall Risk  Flowsheets (Taken 3/3/2024 2030)  Safety Promotion/Fall Prevention:   bed alarm set   assistive device/personal item within reach   Fall Risk reviewed with patient/family   Fall Risk signage in place   family to remain at bedside   high risk medications identified   medications reviewed   lighting adjusted   room near unit station   side rails raised x 2  Intervention: Prevent Skin Injury  Flowsheets (Taken 3/3/2024 2030)  Body Position:   weight shifting   tilted   right   heels elevated   position maintained  Skin Protection:   adhesive use limited   incontinence pads utilized   skin sealant/moisture barrier applied   tubing/devices free from skin contact  Intervention: Prevent and Manage VTE (Venous Thromboembolism) Risk  Flowsheets (Taken 3/3/2024 2030)  Activity Management: Rolling - L1  VTE Prevention/Management:   bleeding risk assessed   bleeding precations maintained   fluids promoted   ROM (passive) performed  Range of Motion: ROM (range of motion) performed  Intervention: Prevent Infection  Flowsheets (Taken 3/3/2024 2030)  Infection Prevention:   cohorting utilized   environmental  surveillance performed   equipment surfaces disinfected   hand hygiene promoted   personal protective equipment utilized   rest/sleep promoted   single patient room provided  Goal: Optimal Comfort and Wellbeing  Outcome: Ongoing, Progressing  Intervention: Monitor Pain and Promote Comfort  Flowsheets (Taken 3/3/2024 2030)  Pain Management Interventions:   care clustered   pain management plan reviewed with patient/caregiver   medication offered   position adjusted   quiet environment facilitated   relaxation techniques promoted  Intervention: Provide Person-Centered Care  Flowsheets (Taken 3/3/2024 2030)  Trust Relationship/Rapport:   care explained   choices provided   emotional support provided   empathic listening provided   questions answered   questions encouraged   reassurance provided   thoughts/feelings acknowledged     Problem: Diabetes Comorbidity  Goal: Blood Glucose Level Within Targeted Range  Outcome: Ongoing, Progressing  Intervention: Monitor and Manage Glycemia  Flowsheets (Taken 3/3/2024 2030)  Glycemic Management:   blood glucose monitored   supplemental insulin given     Problem: Skin Injury Risk Increased  Goal: Skin Health and Integrity  Outcome: Ongoing, Progressing  Intervention: Optimize Skin Protection  Flowsheets (Taken 3/3/2024 2030)  Pressure Reduction Techniques:   weight shift assistance provided   frequent weight shift encouraged   heels elevated off bed   positioned off wounds  Pressure Reduction Devices:   heel offloading device utilized   specialty bed utilized  Skin Protection:   adhesive use limited   incontinence pads utilized   skin sealant/moisture barrier applied   tubing/devices free from skin contact  Head of Bed (HOB) Positioning: HOB at 20-30 degrees  Intervention: Promote and Optimize Oral Intake  Flowsheets (Taken 3/3/2024 2030)  Oral Nutrition Promotion:   calorie-dense foods provided   calorie-dense liquids provided     Problem: Impaired Wound Healing  Goal: Optimal  Wound Healing  Outcome: Ongoing, Progressing  Intervention: Promote Wound Healing  Flowsheets (Taken 3/3/2024 2030)  Oral Nutrition Promotion:   calorie-dense foods provided   calorie-dense liquids provided  Sleep/Rest Enhancement:   regular sleep/rest pattern promoted   room darkened   noise level reduced   awakenings minimized  Activity Management: Rolling - L1  Pain Management Interventions:   care clustered   pain management plan reviewed with patient/caregiver   medication offered   position adjusted   quiet environment facilitated   relaxation techniques promoted     Problem: Fall Injury Risk  Goal: Absence of Fall and Fall-Related Injury  Outcome: Ongoing, Progressing  Intervention: Identify and Manage Contributors  Flowsheets (Taken 3/3/2024 2030)  Self-Care Promotion: BADL personal routines maintained  Medication Review/Management:   medications reviewed   high-risk medications identified     Problem: Infection  Goal: Absence of Infection Signs and Symptoms  Outcome: Ongoing, Progressing  Intervention: Prevent or Manage Infection  Flowsheets (Taken 3/3/2024 2030)  Fever Reduction/Comfort Measures:   lightweight bedding   lightweight clothing   cool cloth applied  Infection Management: aseptic technique maintained  Isolation Precautions:   contact   precautions maintained

## 2024-03-04 NOTE — PLAN OF CARE
Problem: Adult Inpatient Plan of Care  Goal: Plan of Care Review  Outcome: Ongoing, Progressing  Goal: Patient-Specific Goal (Individualized)  Outcome: Ongoing, Progressing  Flowsheets (Taken 3/4/2024 1628)  Anxieties, Fears or Concerns: wound care  Individualized Care Needs: turn Q2, IV antibiotics, monitor gluocose, wound care  Goal: Absence of Hospital-Acquired Illness or Injury  Outcome: Ongoing, Progressing  Intervention: Identify and Manage Fall Risk  Flowsheets (Taken 3/4/2024 1628)  Safety Promotion/Fall Prevention:   assistive device/personal item within reach   bed alarm set   nonskid shoes/socks when out of bed   side rails raised x 2  Intervention: Prevent Skin Injury  Flowsheets (Taken 3/4/2024 1628)  Body Position: supine  Skin Protection:   adhesive use limited   incontinence pads utilized   tubing/devices free from skin contact  Goal: Optimal Comfort and Wellbeing  Outcome: Ongoing, Progressing  Goal: Readiness for Transition of Care  Outcome: Ongoing, Progressing     Problem: Diabetes Comorbidity  Goal: Blood Glucose Level Within Targeted Range  Outcome: Ongoing, Progressing  Intervention: Monitor and Manage Glycemia  Flowsheets (Taken 3/4/2024 1628)  Glycemic Management:   blood glucose monitored   supplemental insulin given     Problem: Skin Injury Risk Increased  Goal: Skin Health and Integrity  Outcome: Ongoing, Progressing     Problem: Impaired Wound Healing  Goal: Optimal Wound Healing  Outcome: Ongoing, Progressing  Intervention: Promote Wound Healing  Flowsheets (Taken 3/4/2024 1628)  Activity Management: Rolling - L1  Pain Management Interventions:   care clustered   pain management plan reviewed with patient/caregiver   position adjusted   pillow support provided     Problem: Fall Injury Risk  Goal: Absence of Fall and Fall-Related Injury  Outcome: Ongoing, Progressing  Intervention: Identify and Manage Contributors  Flowsheets (Taken 3/4/2024 1628)  Self-Care Promotion: BADL personal  objects within reach  Medication Review/Management: medications reviewed     Problem: Infection  Goal: Absence of Infection Signs and Symptoms  Outcome: Ongoing, Progressing  Intervention: Prevent or Manage Infection  Flowsheets (Taken 3/4/2024 3158)  Infection Management: aseptic technique maintained  Isolation Precautions: protective

## 2024-03-05 LAB
GLUCOSE SERPL-MCNC: 328 MG/DL (ref 70–110)
POCT GLUCOSE: 266 MG/DL (ref 70–110)
TOBRAMYCIN PEAK SERPL-MCNC: 7.4 UG/ML (ref 4–8)

## 2024-03-05 PROCEDURE — 27000207 HC ISOLATION

## 2024-03-05 PROCEDURE — 25000003 PHARM REV CODE 250: Performed by: INTERNAL MEDICINE

## 2024-03-05 PROCEDURE — 99900035 HC TECH TIME PER 15 MIN (STAT)

## 2024-03-05 PROCEDURE — 11000004 HC SNF PRIVATE

## 2024-03-05 PROCEDURE — 63600175 PHARM REV CODE 636 W HCPCS: Performed by: PHYSICIAN ASSISTANT

## 2024-03-05 PROCEDURE — 25000003 PHARM REV CODE 250: Performed by: PHYSICIAN ASSISTANT

## 2024-03-05 PROCEDURE — 94760 N-INVAS EAR/PLS OXIMETRY 1: CPT

## 2024-03-05 PROCEDURE — A4216 STERILE WATER/SALINE, 10 ML: HCPCS | Performed by: INTERNAL MEDICINE

## 2024-03-05 PROCEDURE — 25000003 PHARM REV CODE 250: Performed by: STUDENT IN AN ORGANIZED HEALTH CARE EDUCATION/TRAINING PROGRAM

## 2024-03-05 PROCEDURE — 63600175 PHARM REV CODE 636 W HCPCS: Performed by: STUDENT IN AN ORGANIZED HEALTH CARE EDUCATION/TRAINING PROGRAM

## 2024-03-05 PROCEDURE — 97110 THERAPEUTIC EXERCISES: CPT

## 2024-03-05 RX ORDER — HYDROCODONE BITARTRATE AND ACETAMINOPHEN 7.5; 325 MG/1; MG/1
1 TABLET ORAL EVERY 4 HOURS PRN
Status: DISCONTINUED | OUTPATIENT
Start: 2024-03-05 | End: 2024-04-09 | Stop reason: HOSPADM

## 2024-03-05 RX ORDER — HYDROCODONE BITARTRATE AND ACETAMINOPHEN 5; 325 MG/1; MG/1
1 TABLET ORAL EVERY 4 HOURS PRN
Status: DISCONTINUED | OUTPATIENT
Start: 2024-03-05 | End: 2024-04-09 | Stop reason: HOSPADM

## 2024-03-05 RX ADMIN — MICONAZOLE NITRATE 2 % TOPICAL POWDER: at 09:03

## 2024-03-05 RX ADMIN — SODIUM CHLORIDE, PRESERVATIVE FREE 10 ML: 5 INJECTION INTRAVENOUS at 05:03

## 2024-03-05 RX ADMIN — SODIUM CHLORIDE, PRESERVATIVE FREE 10 ML: 5 INJECTION INTRAVENOUS at 12:03

## 2024-03-05 RX ADMIN — FERROUS SULFATE TAB 325 MG (65 MG ELEMENTAL FE) 1 EACH: 325 (65 FE) TAB at 09:03

## 2024-03-05 RX ADMIN — SITAGLIPTIN 100 MG: 50 TABLET, FILM COATED ORAL at 09:03

## 2024-03-05 RX ADMIN — HYDROCODONE BITARTRATE AND ACETAMINOPHEN 1 TABLET: 5; 325 TABLET ORAL at 09:03

## 2024-03-05 RX ADMIN — INSULIN ASPART 15 UNITS: 100 INJECTION, SOLUTION INTRAVENOUS; SUBCUTANEOUS at 05:03

## 2024-03-05 RX ADMIN — LINEZOLID 600 MG: 600 TABLET, FILM COATED ORAL at 09:03

## 2024-03-05 RX ADMIN — INSULIN ASPART 15 UNITS: 100 INJECTION, SOLUTION INTRAVENOUS; SUBCUTANEOUS at 12:03

## 2024-03-05 RX ADMIN — CETIRIZINE HYDROCHLORIDE 10 MG: 10 TABLET, FILM COATED ORAL at 09:03

## 2024-03-05 RX ADMIN — ACETAMINOPHEN 650 MG: 325 TABLET ORAL at 09:03

## 2024-03-05 RX ADMIN — INSULIN DETEMIR 17 UNITS: 100 INJECTION, SOLUTION SUBCUTANEOUS at 09:03

## 2024-03-05 RX ADMIN — TOBRAMYCIN 400 MG: 40 INJECTION INTRAMUSCULAR; INTRAVENOUS at 10:03

## 2024-03-05 RX ADMIN — COLLAGENASE SANTYL: 250 OINTMENT TOPICAL at 09:03

## 2024-03-05 RX ADMIN — INSULIN ASPART 15 UNITS: 100 INJECTION, SOLUTION INTRAVENOUS; SUBCUTANEOUS at 09:03

## 2024-03-05 RX ADMIN — HYDROCODONE BITARTRATE AND ACETAMINOPHEN 1 TABLET: 7.5; 325 TABLET ORAL at 02:03

## 2024-03-05 RX ADMIN — Medication 1 CAPSULE: at 09:03

## 2024-03-05 RX ADMIN — Medication 1 CAPSULE: at 05:03

## 2024-03-05 RX ADMIN — INSULIN DETEMIR 15 UNITS: 100 INJECTION, SOLUTION SUBCUTANEOUS at 09:03

## 2024-03-05 RX ADMIN — PANTOPRAZOLE SODIUM 40 MG: 40 TABLET, DELAYED RELEASE ORAL at 09:03

## 2024-03-05 RX ADMIN — Medication 1 CAPSULE: at 12:03

## 2024-03-05 RX ADMIN — OXYCODONE HYDROCHLORIDE AND ACETAMINOPHEN 500 MG: 500 TABLET ORAL at 09:03

## 2024-03-05 NOTE — PROGRESS NOTES
"Pharmacokinetic Follow Up: Tobramycin    Assessment of levels:   Random concentration of 7.4 mcg/mL (7 hours post-infusion) corresponds to a dosing interval of every 36 hours per the [place] [employer]    Regimen Plan:   Change dosing regimen to: Tobramycin 400 mg IV every 36 hours    Drug levels (last 3 results):  No results for input(s): "AMIKACINPEAK", "AMIKACINTROU", "AMIKACINRAND", "AMIKACIN" in the last 72 hours.    No results for input(s): "GENTAMICIN", "GENTPEAK", "GENTTROUGH", "GENT10", "GENT12", "GENT8", "GENTRANDOM" in the last 72 hours.    Recent Labs   Lab Result Units 03/04/24  1703   Tobramycin Peak ug/ml 7.4       Aminoglycoside Administrations:  aminoglycosides given in last 96 hours                     tobramycin (NEBCIN) 400 mg in dextrose 5 % (D5W) 100 mL IVPB (mg) 400 mg New Bag 03/04/24 0940    tobramycin (NEBCIN) 400 mg in dextrose 5 % (D5W) 100 mL IVPB (mg) 400 mg New Bag 03/02/24 0442                    Pharmacy will continue to monitor.    Please contact pharmacy at extension [phone] with any questions regarding this assessment.    Thank you for the consult,   Errol Garcia      Patient brief summary:  Antonio Galvan II is a 58 y.o. male initiated on aminoglycoside therapy for treatment of skin & soft tissue infection    Drug Allergies:   Review of patient's allergies indicates:  No Known Allergies    Weights:   Wt Readings from Last 1 Encounters:   03/04/24 95 kg (209 lb 7 oz)     Ideal body weight: 66.1 kg (145 lb 11.6 oz)  Adjusted ideal body weight: 77.7 kg (171 lb 3.3 oz)    Renal Function:   Estimated Creatinine Clearance: 92.5 mL/min (based on SCr of 0.98 mg/dL).,     CBC (last 72 hours):  Recent Labs   Lab Result Units 03/04/24  0525   WBC x10(3)/mcL 9.04   Hgb g/dL 11.4*   Hct % 36.2*   Platelet x10(3)/mcL 343   Mono % % 5.8   Eos % % 5.4   Basophil % % 0.4       Metabolic Panel (last 72 hours):  Recent Labs   Lab Result Units 03/04/24  0525   Sodium Level mmol/L 139   Potassium " Level mmol/L 4.6   Chloride mmol/L 105   Carbon Dioxide mmol/L 21*   Glucose Level mg/dL 396*   Blood Urea Nitrogen mg/dL 25.0   Creatinine mg/dL 0.98   Magnesium Level mg/dL 1.80       Microbiologic Results:  Microbiology Results (last 7 days)       ** No results found for the last 168 hours. **

## 2024-03-05 NOTE — PLAN OF CARE
Problem: Adult Inpatient Plan of Care  Goal: Plan of Care Review  Outcome: Ongoing, Progressing  Flowsheets (Taken 3/4/2024 2030)  Plan of Care Reviewed With:   patient   family  Goal: Patient-Specific Goal (Individualized)  Outcome: Ongoing, Progressing  Flowsheets (Taken 3/4/2024 2030)  Anxieties, Fears or Concerns: concerned about keeping wound dressings clean  Individualized Care Needs: IV abx, turn q2, monitor wound drainage, wound care, monitor CBGs, bowel regimen, suprapubic catheter care, pain management  Goal: Absence of Hospital-Acquired Illness or Injury  Outcome: Ongoing, Progressing  Intervention: Prevent Skin Injury  Flowsheets (Taken 3/4/2024 2030)  Skin Protection:   adhesive use limited   tubing/devices free from skin contact   incontinence pads utilized  Intervention: Prevent and Manage VTE (Venous Thromboembolism) Risk  Flowsheets (Taken 3/4/2024 2030)  VTE Prevention/Management:   bleeding precations maintained   bleeding risk assessed   ROM (passive) performed  Range of Motion: ROM (range of motion) performed     Problem: Diabetes Comorbidity  Goal: Blood Glucose Level Within Targeted Range  Outcome: Ongoing, Progressing  Intervention: Monitor and Manage Glycemia  Flowsheets (Taken 3/4/2024 2030)  Glycemic Management:   blood glucose monitored   supplemental insulin given     Problem: Infection  Goal: Absence of Infection Signs and Symptoms  Outcome: Ongoing, Progressing  Intervention: Prevent or Manage Infection  Flowsheets (Taken 3/4/2024 2030)  Infection Management: aseptic technique maintained

## 2024-03-05 NOTE — PLAN OF CARE
Problem: Adult Inpatient Plan of Care  Goal: Plan of Care Review  Outcome: Ongoing, Progressing  Goal: Patient-Specific Goal (Individualized)  Outcome: Ongoing, Progressing  Flowsheets (Taken 3/5/2024 1547)  Anxieties, Fears or Concerns: concerned about wound vac suctioning  Individualized Care Needs: turn Q2, wound care, wound vac monitoring, monitor blood glucose, vitals, pain management  Goal: Absence of Hospital-Acquired Illness or Injury  Outcome: Ongoing, Progressing  Intervention: Identify and Manage Fall Risk  Flowsheets (Taken 3/5/2024 1547)  Safety Promotion/Fall Prevention:   assistive device/personal item within reach   bed alarm set   nonskid shoes/socks when out of bed   side rails raised x 2  Goal: Optimal Comfort and Wellbeing  Outcome: Ongoing, Progressing  Goal: Readiness for Transition of Care  Outcome: Ongoing, Progressing     Problem: Diabetes Comorbidity  Goal: Blood Glucose Level Within Targeted Range  Outcome: Ongoing, Progressing  Intervention: Monitor and Manage Glycemia  Flowsheets (Taken 3/5/2024 1547)  Glycemic Management: blood glucose monitored     Problem: Skin Injury Risk Increased  Goal: Skin Health and Integrity  Outcome: Ongoing, Progressing  Intervention: Optimize Skin Protection  Flowsheets (Taken 3/5/2024 1547)  Pressure Reduction Techniques: frequent weight shift encouraged  Head of Bed (HOB) Positioning: HOB at 30 degrees     Problem: Impaired Wound Healing  Goal: Optimal Wound Healing  Outcome: Ongoing, Progressing  Intervention: Promote Wound Healing  Flowsheets (Taken 3/5/2024 1547)  Oral Nutrition Promotion: calorie-dense foods provided  Activity Management: Rolling - L1     Problem: Fall Injury Risk  Goal: Absence of Fall and Fall-Related Injury  Outcome: Ongoing, Progressing  Intervention: Identify and Manage Contributors  Flowsheets (Taken 3/5/2024 1547)  Self-Care Promotion:   safe use of adaptive equipment encouraged   independence encouraged   BADL personal  objects within reach  Medication Review/Management: medications reviewed  Intervention: Promote Injury-Free Environment  Flowsheets (Taken 3/5/2024 1541)  Safety Promotion/Fall Prevention:   assistive device/personal item within reach   bed alarm set   nonskid shoes/socks when out of bed   side rails raised x 2     Problem: Infection  Goal: Absence of Infection Signs and Symptoms  Outcome: Ongoing, Progressing  Intervention: Prevent or Manage Infection  Flowsheets (Taken 3/5/2024 4496)  Infection Management: aseptic technique maintained  Isolation Precautions: protective

## 2024-03-05 NOTE — PROGRESS NOTES
[facility]  [facility] MEDICINE ~ PROGRESS NOTE  CHIEF COMPLAINT   Hospital follow up for nonhealing wound    HOSPITAL COURSE   58-year-old man with quadriplegic spinal paralysis and numerous decubitus ulcers who is followed by wound care is brought in today due to fever. He had wound cultures done 3 days ago that have grown Proteus mirabilis and Klebsiella pneumoniae. Sensitivities are not complete. The wounds were noted to have significant odor and heavy green drainage. In addition the patient had some nausea and vomiting and home health noted that his blood pressure was dipping. He was advised to come to the emergency room. Wound cultures were drawn from the right hip wound. His wife reports that when the wound is pressed above pus comes out. Both Klebsiella and Proteus maybe treated with fluoroquinolones which are both IV and oral. The patient's wife preferred if the patient came home so that she could attend to his wounds however the patient expressed a desire to be admitted.     Today:  Glucose somewhat better controlled today. Continue to titrate insulin as needed. Will need to pre medicate patient for turns if able as patient does experience significant headaches and sweating from associated pain.     OBJECTIVE/PHYSICAL EXAM     VITAL SIGNS (MOST RECENT):  Temp: 97.6 °F (36.4 °C) (03/05/24 1149)  Pulse: 97 (03/05/24 1149)  Resp: 18 (03/05/24 0917)  BP: (!) 175/94 (03/05/24 1149)  SpO2: 100 % (03/05/24 1149) VITAL SIGNS (24 HOUR RANGE):  Temp:  [97.6 °F (36.4 °C)-98.6 °F (37 °C)] 97.6 °F (36.4 °C)  Pulse:  [] 97  Resp:  [18-19] 18  SpO2:  [97 %-100 %] 100 %  BP: (104-179)/(68-94) 175/94     GENERAL: In no acute distress, afebrile  HEENT:  PERRLA  CHEST: Clear to auscultation bilaterally  HEART: S1, S2, no appreciable murmur  ABDOMEN: Soft, nontender, BS +  MSK: Warm, no lower extremity edema, no clubbing or cyanosis  NEUROLOGIC: Alert and oriented x4  INTEGUMENTARY:  See  media for wounds    ASSESSMENT/PLAN     Nonhealing chronic wound   VRE, Proteus, Pseudomonas, Klebsiella  S/p debridement with Dr. Jean on 02/27/2024  Zyvox and Tobramycin - will need 6 weeks of treatment given exposed bone starting 3/2-4/12  Wound VAC      Post op anemia   H&H improved s/p 2u pRBC 02/28/2024 and 1u pRBC on 02/29/2024  No anticoagulation at this time - aspirin d/c 02/28/2024     Hyperglycemia in setting of DM  A1c 02/23/2024 8.5%  Reports taking 80u Levemir BID  Continue home Sitagliptin  Continue to titrate long and short-acting insulin     Hypotension - resolved      Hx of: Neurogenic bladder, Quadriplegia     DVT prophylaxis:  SCDs, history of bleeding in the recent past    Anticipated discharge and disposition:  Can potentially consider home antibiotics however many limiting factors including wound VAC and regular wound care  __________________________________________________________________________    LABS/MICRO/MEDS/DIAGNOSTICS     LABS  Recent Labs     03/04/24  0525      K 4.6   CHLORIDE 105   CO2 21*   BUN 25.0   CREATININE 0.98   GLUCOSE 396*   CALCIUM 8.2*     Recent Labs     03/04/24  0525   WBC 9.04   RBC 4.21*   HCT 36.2*   MCV 86.0        MICROBIOLOGY  Microbiology Results (last 7 days)       ** No results found for the last 168 hours. **             MEDICATIONS   acetaminophen  650 mg Oral QHS    ascorbic acid (vitamin C)  500 mg Oral Daily    cetirizine  10 mg Oral Daily    collagenase   Topical (Top) Daily    ferrous sulfate  1 tablet Oral Daily    insulin aspart U-100  15 Units Subcutaneous TIDWM    insulin detemir U-100  15 Units Subcutaneous BID    Lactobacillus acidophilus  1 capsule Oral TID WM    linezolid  600 mg Oral Q12H    miconazole NITRATE 2 %   Topical (Top) BID    pantoprazole  40 mg Oral Daily    SITagliptin phosphate  100 mg Oral Daily    sodium chloride 0.9%  10 mL Intravenous Q6H    tobramycin IV (PEDS and ADULTS)  400 mg Intravenous Q36H          INFUSIONS       DIAGNOSTIC TESTS  X-Ray Chest 1 View   Final Result      Left PICC tip overlies the mid SVC.         Electronically signed by: Oj Solomon   Date:    02/28/2024   Time:    14:29           EF   Date Value Ref Range Status   05/09/2023 60 % Final   07/18/2022 40 % Final        NUTRITION STATUS  Patient meets ASPEN criteria for   malnutrition of   per RD assessment as evidenced by:                       A minimum of two characteristics is recommended for diagnosis of either severe or non-severe malnutrition.     Case related differential diagnoses have been reviewed; assessment and plan has been documented. I have personally reviewed the labs and test results that are currently available; I have reviewed the patients medication list. I have reviewed the consulting providers recommendations. I have reviewed or attempted to review medical records based upon their availability.  All of the patient's and/or family's questions have been addressed and answered to the best of my ability.  I will continue to monitor closely and make adjustments to medical management as needed.  This document was created using M*Modal Fluency Direct.  Transcription errors may have been made.  Please contact me if any questions may rise regarding documentation to clarify transcription.      ANTHONY Vazquez   Internal Medicine  Department of Hospital Medicine [facility]

## 2024-03-05 NOTE — PT/OT/SLP PROGRESS
Physical Therapy Treatment Note           Name: Antonio Galvan II    : 1965 (58 y.o.)  MRN: [id number]           TREATMENT SUMMARY AND RECOMMENDATIONS:    PT Received On: 24  PT Start Time: 1520     PT Stop Time: 1548  PT Total Time (min): 28 min     Subjective Assessment:   No complaints  Lethargic   x Awake, alert, cooperative  Uncooperative    Agitated  c/o pain    Appropriate  c/o fatigue    Confused x Treated at bedside     Emotionally labile  Treated in gym/dept.    Impulsive  Other:    Flat affect       Therapy Precautions:    Cognitive deficits  Spinal precautions    Collar - hard  Sternal precautions    Collar - soft   TLSO    Fall risk  LSO    Hip precautions - posterior  Knee immobilizer    Hip precautions - anterior  WBAT    Impaired communication  Partial weightbearing    Oxygen  TTWB    PEG tube  NWB    Visual deficits x Other: Wound Vac, super pubic cath.     Hearing deficits          Treatment Objectives:     Mobility Training:   Assist level Comments    Bed mobility Total A Proper positioning provided at completion of ROM tasks to ensure joint alignment and reduce skin break down   Transfer     Gait     Sit to stand transitions     Sitting balance     Standing balance      Wheelchair mobility     Car transfer     Other:          Therapeutic Exercise:   Exercise Sets Reps Comments   PROM to B UE and LE all joints  15 All functional planes to maintain and improve mobility to maximize positioning ability to reduce skin break down.                          Additional Comments:  Pt with good tolerance, no complaints. Will consult with wound care and attempt EOB and OOB as wounds improve     Assessment: Patient tolerated session better - pt in better spirits     PT Plan: continue per POC  Revisions made to plan of care: No    GOALS:   Multidisciplinary Problems       Physical Therapy Goals          Problem: Physical Therapy    Goal Priority Disciplines Outcome Goal Variances  Interventions   Physical Therapy Goal     PT, PT/OT Ongoing, Progressing     Description: Goals to be met by: Discharge     Pt to be seen for ROM tasks QD to ensure proper positioning can be obtained to overall reduce impacts of prolonged bed rest and skin break down  Progress pending healing stages of current wounds    Patient will increase functional independence with mobility by performin. Pt to tolerate PROM tasks to B UE and LE, while obtaining 25% improvement in range.   2. Sitting EOB to tolerance pending wound healing - with pt demonstrating normal BP  3. Progress to OOB into chair once able.                          Skilled PT Minutes Provided: 25   Communication with Treatment Team:     Equipment recommendations:       At end of treatment, patient remained:   Up in chair     Up in wheelchair in room   x In bed    With alarm activated    Bed rails up   x Call bell in reach    x Family/friends present    Restraints secured properly    In bathroom with CNA/RN notified   x Nurse aware    In gym with therapist/tech    Other:

## 2024-03-05 NOTE — PROGRESS NOTES
Inpatient Nutrition Evaluation    Admit Date: 2/26/2024   Total duration of encounter: 8 days   Patient Age: 58 y.o.    Nutrition Recommendation/Prescription     Continue regular diet. Adjust diet, per patient request. Requesting sugar free.  2. Continue Arginaid 1 pk BID for wound healing 3. Continue ascorbic acid 500 mg PO daily 4. Weekly weights 5. Monitor intake, wt, labs, medications.     Nutrition Assessment     Chart Review    Reason Seen: continuous nutrition monitoring, length of stay, and follow-up    Malnutrition Screening Tool Results   Have you recently lost weight without trying?: Yes: 34 lbs or more  Have you been eating poorly because of a decreased appetite?: Yes   MST Score: 5   Diagnosis:  Infected wounds/sacrum, abd, feet  Klebsiella pneumoniae, Pseudomonas aeruginosa, Proteus mirabilis  DM,   Hypotension  Hyperglycemia  VRE  A-fib       Relevant Medical History:     Cardiac arrest        7/2022    Chronic skin ulcer      DM (diabetes mellitus)      Infected decubitus ulcer      Lymphedema of leg      Neurogenic bladder      Obesity, unspecified      Pressure ulcer of heel      Pressure ulcer of right foot, stage 3      Pressure ulcer of right ischium      Quadriplegia      Quadriplegic spinal paralysis          Scheduled Medications:  acetaminophen, 650 mg, QHS  ascorbic acid (vitamin C), 500 mg, Daily  cetirizine, 10 mg, Daily  collagenase, , Daily  ferrous sulfate, 1 tablet, Daily  insulin aspart U-100, 15 Units, TIDWM  insulin detemir U-100, 15 Units, BID  Lactobacillus acidophilus, 1 capsule, TID WM  linezolid, 600 mg, Q12H  miconazole NITRATE 2 %, , BID  pantoprazole, 40 mg, Daily  SITagliptin phosphate, 100 mg, Daily  sodium chloride 0.9%, 10 mL, Q6H  tobramycin IV (PEDS and ADULTS), 400 mg, Q36H    Continuous Infusions:   PRN Medications: 0.9%  NaCl infusion (for blood administration), 0.9%  NaCl infusion (for blood administration), sodium chloride 0.9%, acetaminophen, albuterol,  benzonatate, bisacodyL, budesonide, calcium carbonate, dextrose 10%, dextrose 10%, diphenhydrAMINE, glucagon (human recombinant), glucose, glucose, hydrALAZINE, HYDROcodone-acetaminophen, HYDROcodone-acetaminophen, hydrOXYzine pamoate, insulin aspart U-100, loperamide, melatonin, metoprolol, ondansetron, simethicone, Flushing PICC/Midline Protocol **AND** sodium chloride 0.9% **AND** sodium chloride 0.9%, zolpidem    Recent Labs   Lab 02/28/24  0454 02/29/24  0519 03/01/24  0505 03/02/24  0541 03/04/24  0525   * 136 133* 136 139   K 5.1 4.5 4.9 4.9 4.6   CALCIUM 8.1* 8.0* 8.3* 8.3* 8.2*   MG  --   --   --   --  1.80   CHLORIDE 101 106 104 104 105   CO2 19* 19* 20* 21* 21*   BUN 23.7 20.4 19.0 19.9 25.0   CREATININE 1.20* 1.01 0.87 0.92 0.98   EGFRNORACEVR >60 >60 >60 >60 >60   GLUCOSE 334* 359* 275* 250* 396*   BILITOT 0.2  --   --   --   --    ALKPHOS 113  --   --   --   --    ALT 14  --   --   --   --    AST 7  --   --   --   --    ALBUMIN 1.6*  --   --   --   --    PREALB 7.4*  --   --   --   --    WBC 8.50 7.52 8.90 8.99 9.04   HGB 6.6* 8.9* 10.7* 11.1* 11.4*   HCT 22.7* 27.7* 33.6* 35.1* 36.2*     Nutrition Orders:  Diet Adult Regular  Dietary nutrition supplements Arginaid - Any flavor; BID    Appetite/Oral Intake: good/% of meals  Factors Affecting Nutritional Intake:  multiple wound 6 (see wound care nurse note on 3/4/24)  Food/Hindu/Cultural Preferences:  cereal  Food Allergies: none reported  Last Bowel Movement: 03/04/24  Wound(s):     Altered Skin Integrity 05/06/22 1140 Right Heel  Full thickness tissue loss. Subcutaneous fat may be visible but bone, tendon or muscle are not exposed-Tissue loss description: Full thickness       Altered Skin Integrity 01/12/23 1530 Sacral spine Full thickness tissue loss with exposed bone, tendon, or muscle. Often includes undermining and tunneling. May extend into muscle and/or supporting structures.-Tissue loss description: Full thickness       Altered  "Skin Integrity 08/01/23 1535 Left anterior Ankle Other (comment) Full thickness tissue loss. Base is covered by slough and/or eschar in the wound bed-Tissue loss description: Full thickness     Comments    Pt has multiple wound see wound care nurse note. Pt on arginaid and ascorbic acid for wound healing. Discussed double protein-pt decline at this time for wound healing. Pt only requested cereal and milk. Discussed food preferences. Marked menus. Will monitor.     Anthropometrics    Height: 5' 7" (170.2 cm), Height Method: Stated  Last Weight: 95 kg (209 lb 7 oz) (03/04/24 0500), Weight Method: Bed Scale  BMI (Calculated): 32.8  BMI Classification: obese grade I (BMI 30-34.9)        Ideal Body Weight (IBW), Male: 148 lb     % Ideal Body Weight, Male (lb): 139.86 %                          Usual Weight Provided By: unable to obtain usual weight    Wt Readings from Last 5 Encounters:   03/04/24 95 kg (209 lb 7 oz)   02/27/24 93.9 kg (207 lb 0.2 oz)   02/23/24 93.9 kg (207 lb)   08/16/23 106.4 kg (234 lb 9.6 oz)   08/15/23 113.4 kg (250 lb)     Weight Change(s) Since Admission: +1.1 kg wt gained  Wt Readings from Last 1 Encounters:   03/04/24 0500 95 kg (209 lb 7 oz)   02/26/24 1500 93.9 kg (207 lb)   Admit Weight: 93.9 kg (207 lb) (02/26/24 1500), Weight Method: Bed Scale (Used bed weight from admission 2/23/24 as his bed does not have a scale)    Patient Education     Not applicable.    Nutrition Goals & Monitoring     Dietitian will monitor: food and beverage intake, weight, weight change, electrolyte/renal panel, glucose/endocrine profile, and gastrointestinal profile    Nutrition Risk/Follow-Up: low (follow-up in 5-7 days)  Patients assigned 'low nutrition risk' status do not qualify for a full nutritional assessment but will be monitored and re-evaluated in a 5-7 day time period. Please consult if re-evaluation needed sooner.  "

## 2024-03-06 LAB
ANION GAP SERPL CALC-SCNC: 10 MEQ/L
BASOPHILS # BLD AUTO: 0.05 X10(3)/MCL
BASOPHILS NFR BLD AUTO: 0.6 %
BUN SERPL-MCNC: 29.9 MG/DL (ref 8.4–25.7)
CALCIUM SERPL-MCNC: 8.3 MG/DL (ref 8.4–10.2)
CHLORIDE SERPL-SCNC: 102 MMOL/L (ref 98–107)
CO2 SERPL-SCNC: 22 MMOL/L (ref 22–29)
CREAT SERPL-MCNC: 0.96 MG/DL (ref 0.73–1.18)
CREAT/UREA NIT SERPL: 31
EOSINOPHIL # BLD AUTO: 0.55 X10(3)/MCL (ref 0–0.9)
EOSINOPHIL NFR BLD AUTO: 6.3 %
ERYTHROCYTE [DISTWIDTH] IN BLOOD BY AUTOMATED COUNT: 16.5 % (ref 11.5–17)
GFR SERPLBLD CREATININE-BSD FMLA CKD-EPI: >60 MLS/MIN/1.73/M2
GLUCOSE SERPL-MCNC: 219 MG/DL (ref 70–110)
GLUCOSE SERPL-MCNC: 230 MG/DL (ref 70–110)
GLUCOSE SERPL-MCNC: 274 MG/DL (ref 70–110)
GLUCOSE SERPL-MCNC: 280 MG/DL (ref 70–110)
GLUCOSE SERPL-MCNC: 280 MG/DL (ref 70–110)
GLUCOSE SERPL-MCNC: 318 MG/DL (ref 70–110)
GLUCOSE SERPL-MCNC: 329 MG/DL (ref 74–100)
GLUCOSE SERPL-MCNC: 336 MG/DL (ref 70–110)
HCT VFR BLD AUTO: 34.9 % (ref 42–52)
HGB BLD-MCNC: 10.8 G/DL (ref 14–18)
IMM GRANULOCYTES # BLD AUTO: 0.06 X10(3)/MCL (ref 0–0.04)
IMM GRANULOCYTES NFR BLD AUTO: 0.7 %
LYMPHOCYTES # BLD AUTO: 2.84 X10(3)/MCL (ref 0.6–4.6)
LYMPHOCYTES NFR BLD AUTO: 32.4 %
MCH RBC QN AUTO: 27.3 PG (ref 27–31)
MCHC RBC AUTO-ENTMCNC: 30.9 G/DL (ref 33–36)
MCV RBC AUTO: 88.4 FL (ref 80–94)
MONOCYTES # BLD AUTO: 0.46 X10(3)/MCL (ref 0.1–1.3)
MONOCYTES NFR BLD AUTO: 5.3 %
NEUTROPHILS # BLD AUTO: 4.8 X10(3)/MCL (ref 2.1–9.2)
NEUTROPHILS NFR BLD AUTO: 54.7 %
PLATELET # BLD AUTO: 361 X10(3)/MCL (ref 130–400)
PMV BLD AUTO: 10.5 FL (ref 7.4–10.4)
POTASSIUM SERPL-SCNC: 4.8 MMOL/L (ref 3.5–5.1)
RBC # BLD AUTO: 3.95 X10(6)/MCL (ref 4.7–6.1)
SODIUM SERPL-SCNC: 134 MMOL/L (ref 136–145)
WBC # SPEC AUTO: 8.76 X10(3)/MCL (ref 4.5–11.5)

## 2024-03-06 PROCEDURE — 85025 COMPLETE CBC W/AUTO DIFF WBC: CPT | Performed by: PHYSICIAN ASSISTANT

## 2024-03-06 PROCEDURE — 63600175 PHARM REV CODE 636 W HCPCS: Performed by: PHYSICIAN ASSISTANT

## 2024-03-06 PROCEDURE — 25000003 PHARM REV CODE 250: Performed by: PHYSICIAN ASSISTANT

## 2024-03-06 PROCEDURE — 99900035 HC TECH TIME PER 15 MIN (STAT)

## 2024-03-06 PROCEDURE — 11000004 HC SNF PRIVATE

## 2024-03-06 PROCEDURE — 97535 SELF CARE MNGMENT TRAINING: CPT

## 2024-03-06 PROCEDURE — 27000207 HC ISOLATION

## 2024-03-06 PROCEDURE — 80048 BASIC METABOLIC PNL TOTAL CA: CPT | Performed by: PHYSICIAN ASSISTANT

## 2024-03-06 PROCEDURE — 97530 THERAPEUTIC ACTIVITIES: CPT

## 2024-03-06 PROCEDURE — 25000003 PHARM REV CODE 250: Performed by: INTERNAL MEDICINE

## 2024-03-06 PROCEDURE — A4216 STERILE WATER/SALINE, 10 ML: HCPCS | Performed by: INTERNAL MEDICINE

## 2024-03-06 PROCEDURE — 63600175 PHARM REV CODE 636 W HCPCS: Performed by: STUDENT IN AN ORGANIZED HEALTH CARE EDUCATION/TRAINING PROGRAM

## 2024-03-06 PROCEDURE — 94760 N-INVAS EAR/PLS OXIMETRY 1: CPT

## 2024-03-06 PROCEDURE — 97110 THERAPEUTIC EXERCISES: CPT

## 2024-03-06 PROCEDURE — 25000003 PHARM REV CODE 250: Performed by: STUDENT IN AN ORGANIZED HEALTH CARE EDUCATION/TRAINING PROGRAM

## 2024-03-06 RX ADMIN — FERROUS SULFATE TAB 325 MG (65 MG ELEMENTAL FE) 1 EACH: 325 (65 FE) TAB at 09:03

## 2024-03-06 RX ADMIN — ACETAMINOPHEN 650 MG: 325 TABLET ORAL at 08:03

## 2024-03-06 RX ADMIN — HYDROCODONE BITARTRATE AND ACETAMINOPHEN 1 TABLET: 7.5; 325 TABLET ORAL at 01:03

## 2024-03-06 RX ADMIN — COLLAGENASE SANTYL: 250 OINTMENT TOPICAL at 09:03

## 2024-03-06 RX ADMIN — INSULIN DETEMIR 17 UNITS: 100 INJECTION, SOLUTION SUBCUTANEOUS at 09:03

## 2024-03-06 RX ADMIN — SODIUM CHLORIDE, PRESERVATIVE FREE 10 ML: 5 INJECTION INTRAVENOUS at 06:03

## 2024-03-06 RX ADMIN — LINEZOLID 600 MG: 600 TABLET, FILM COATED ORAL at 09:03

## 2024-03-06 RX ADMIN — INSULIN ASPART 15 UNITS: 100 INJECTION, SOLUTION INTRAVENOUS; SUBCUTANEOUS at 07:03

## 2024-03-06 RX ADMIN — SODIUM CHLORIDE, PRESERVATIVE FREE 10 ML: 5 INJECTION INTRAVENOUS at 11:03

## 2024-03-06 RX ADMIN — Medication 1 CAPSULE: at 11:03

## 2024-03-06 RX ADMIN — PANTOPRAZOLE SODIUM 40 MG: 40 TABLET, DELAYED RELEASE ORAL at 09:03

## 2024-03-06 RX ADMIN — HYDROCODONE BITARTRATE AND ACETAMINOPHEN 1 TABLET: 7.5; 325 TABLET ORAL at 09:03

## 2024-03-06 RX ADMIN — INSULIN ASPART 15 UNITS: 100 INJECTION, SOLUTION INTRAVENOUS; SUBCUTANEOUS at 11:03

## 2024-03-06 RX ADMIN — MICONAZOLE NITRATE 2 % TOPICAL POWDER: at 09:03

## 2024-03-06 RX ADMIN — CETIRIZINE HYDROCHLORIDE 10 MG: 10 TABLET, FILM COATED ORAL at 09:03

## 2024-03-06 RX ADMIN — INSULIN ASPART 15 UNITS: 100 INJECTION, SOLUTION INTRAVENOUS; SUBCUTANEOUS at 04:03

## 2024-03-06 RX ADMIN — MICONAZOLE NITRATE 2 % TOPICAL POWDER: at 08:03

## 2024-03-06 RX ADMIN — Medication 1 CAPSULE: at 07:03

## 2024-03-06 RX ADMIN — INSULIN DETEMIR 20 UNITS: 100 INJECTION, SOLUTION SUBCUTANEOUS at 08:03

## 2024-03-06 RX ADMIN — SITAGLIPTIN 100 MG: 50 TABLET, FILM COATED ORAL at 09:03

## 2024-03-06 RX ADMIN — LINEZOLID 600 MG: 600 TABLET, FILM COATED ORAL at 08:03

## 2024-03-06 RX ADMIN — INSULIN ASPART 8 UNITS: 100 INJECTION, SOLUTION INTRAVENOUS; SUBCUTANEOUS at 05:03

## 2024-03-06 RX ADMIN — SODIUM CHLORIDE, PRESERVATIVE FREE 10 ML: 5 INJECTION INTRAVENOUS at 05:03

## 2024-03-06 RX ADMIN — Medication 1 CAPSULE: at 04:03

## 2024-03-06 RX ADMIN — SODIUM CHLORIDE, PRESERVATIVE FREE 10 ML: 5 INJECTION INTRAVENOUS at 01:03

## 2024-03-06 RX ADMIN — OXYCODONE HYDROCHLORIDE AND ACETAMINOPHEN 500 MG: 500 TABLET ORAL at 09:03

## 2024-03-06 NOTE — PROGRESS NOTES
[facility]  [facility] MEDICINE ~ PROGRESS NOTE  CHIEF COMPLAINT   Hospital follow up for nonhealing wound    HOSPITAL COURSE   58-year-old man with quadriplegic spinal paralysis and numerous decubitus ulcers who is followed by wound care is brought in today due to fever. He had wound cultures done 3 days ago that have grown Proteus mirabilis and Klebsiella pneumoniae. Sensitivities are not complete. The wounds were noted to have significant odor and heavy green drainage. In addition the patient had some nausea and vomiting and home health noted that his blood pressure was dipping. He was advised to come to the emergency room. Wound cultures were drawn from the right hip wound. His wife reports that when the wound is pressed above pus comes out. Both Klebsiella and Proteus maybe treated with fluoroquinolones which are both IV and oral. The patient's wife preferred if the patient came home so that she could attend to his wounds however the patient expressed a desire to be admitted.     Today:  Patient reports premedicated for turns his working well for him.  Continues to need insulin titration almost daily.  Wound care plan for tomorrow.    OBJECTIVE/PHYSICAL EXAM     VITAL SIGNS (MOST RECENT):  Temp: 97.9 °F (36.6 °C) (03/06/24 1100)  Pulse: 84 (03/06/24 1100)  Resp: 20 (03/06/24 1338)  BP: 102/66 (03/06/24 1100)  SpO2: 98 % (03/06/24 1100) VITAL SIGNS (24 HOUR RANGE):  Temp:  [97.4 °F (36.3 °C)-98.3 °F (36.8 °C)] 97.9 °F (36.6 °C)  Pulse:  [] 84  Resp:  [18-20] 20  SpO2:  [98 %-100 %] 98 %  BP: (102-227)/() 102/66     GENERAL: In no acute distress, afebrile  HEENT:  PERRLA  CHEST: Clear to auscultation bilaterally  HEART: S1, S2, no appreciable murmur  ABDOMEN: Soft, nontender, BS +  MSK: Warm, no lower extremity edema, no clubbing or cyanosis  NEUROLOGIC: Alert and oriented x4  INTEGUMENTARY:  See media for wounds    ASSESSMENT/PLAN     Nonhealing chronic wound   VRE,  Proteus, Pseudomonas, Klebsiella  S/p debridement with Dr. Jean on 02/27/2024  Zyvox and Tobramycin - will need 6 weeks of treatment given exposed bone starting 3/2-4/12  Wound VAC      Post op anemia   H&H improved s/p 2u pRBC 02/28/2024 and 1u pRBC on 02/29/2024  No anticoagulation at this time - aspirin d/c 02/28/2024     Hyperglycemia in setting of DM  A1c 02/23/2024 8.5%  Reports taking 80u Levemir BID  Continue home Sitagliptin  Continue to titrate long and short-acting insulin     Hypotension - resolved      Hx of: Neurogenic bladder, Quadriplegia     DVT prophylaxis:  SCDs, history of bleeding in the recent past    Anticipated discharge and disposition:  Can potentially consider home antibiotics however many limiting factors including wound VAC and regular wound care  __________________________________________________________________________    LABS/MICRO/MEDS/DIAGNOSTICS     LABS  Recent Labs     03/06/24  0517   *   K 4.8   CHLORIDE 102   CO2 22   BUN 29.9*   CREATININE 0.96   GLUCOSE 329*   CALCIUM 8.3*     Recent Labs     03/06/24  0517   WBC 8.76   RBC 3.95*   HCT 34.9*   MCV 88.4        MICROBIOLOGY  Microbiology Results (last 7 days)       ** No results found for the last 168 hours. **             MEDICATIONS   acetaminophen  650 mg Oral QHS    ascorbic acid (vitamin C)  500 mg Oral Daily    cetirizine  10 mg Oral Daily    collagenase   Topical (Top) Daily    ferrous sulfate  1 tablet Oral Daily    insulin aspart U-100  15 Units Subcutaneous TIDWM    insulin detemir U-100  20 Units Subcutaneous BID    Lactobacillus acidophilus  1 capsule Oral TID WM    linezolid  600 mg Oral Q12H    miconazole NITRATE 2 %   Topical (Top) BID    pantoprazole  40 mg Oral Daily    SITagliptin phosphate  100 mg Oral Daily    sodium chloride 0.9%  10 mL Intravenous Q6H    tobramycin IV (PEDS and ADULTS)  400 mg Intravenous Q36H         INFUSIONS       DIAGNOSTIC TESTS  X-Ray Chest 1 View   Final Result       Left PICC tip overlies the mid SVC.         Electronically signed by: Oj Solomon   Date:    02/28/2024   Time:    14:29           EF   Date Value Ref Range Status   05/09/2023 60 % Final   07/18/2022 40 % Final        NUTRITION STATUS  Patient meets ASPEN criteria for   malnutrition of   per RD assessment as evidenced by:                       A minimum of two characteristics is recommended for diagnosis of either severe or non-severe malnutrition.     Case related differential diagnoses have been reviewed; assessment and plan has been documented. I have personally reviewed the labs and test results that are currently available; I have reviewed the patients medication list. I have reviewed the consulting providers recommendations. I have reviewed or attempted to review medical records based upon their availability.  All of the patient's and/or family's questions have been addressed and answered to the best of my ability.  I will continue to monitor closely and make adjustments to medical management as needed.  This document was created using M*Modal Fluency Direct.  Transcription errors may have been made.  Please contact me if any questions may rise regarding documentation to clarify transcription.      ANTHONY Vazquez   Internal Medicine  Department of Hospital Medicine [facility]

## 2024-03-06 NOTE — PLAN OF CARE
Problem: Adult Inpatient Plan of Care  Goal: Plan of Care Review  Outcome: Ongoing, Progressing  Flowsheets (Taken 3/6/2024 1741)  Plan of Care Reviewed With: patient  Goal: Patient-Specific Goal (Individualized)  Outcome: Ongoing, Progressing  Flowsheets (Taken 3/6/2024 1741)  Anxieties, Fears or Concerns: none expressed  Individualized Care Needs: monitor CBG's, turn q2, wound care     Problem: Diabetes Comorbidity  Goal: Blood Glucose Level Within Targeted Range  Outcome: Ongoing, Progressing  Intervention: Monitor and Manage Glycemia  Flowsheets (Taken 3/6/2024 1741)  Glycemic Management: blood glucose monitored     Problem: Skin Injury Risk Increased  Goal: Skin Health and Integrity  Outcome: Ongoing, Progressing  Intervention: Optimize Skin Protection  Flowsheets (Taken 3/6/2024 1741)  Pressure Reduction Techniques:   frequent weight shift encouraged   sit time limited to 2 hours   weight shift assistance provided  Pressure Reduction Devices: specialty bed utilized  Skin Protection:   adhesive use limited   silicone foam dressing in place   transparent dressing maintained   tubing/devices free from skin contact   skin-to-device areas padded   skin-to-skin areas padded  Head of Bed (HOB) Positioning: HOB at 60 degrees  Intervention: Promote and Optimize Oral Intake  Flowsheets (Taken 3/6/2024 1741)  Oral Nutrition Promotion: physical activity promoted     Problem: Impaired Wound Healing  Goal: Optimal Wound Healing  Outcome: Ongoing, Progressing  Intervention: Promote Wound Healing  Flowsheets (Taken 3/6/2024 1741)  Oral Nutrition Promotion: physical activity promoted  Activity Management: Rolling - L1  Pain Management Interventions:   care clustered   medication offered   premedicated for activity   pillow support provided   position adjusted     Problem: Infection  Goal: Absence of Infection Signs and Symptoms  Outcome: Ongoing, Progressing  Intervention: Prevent or Manage Infection  Flowsheets (Taken  3/6/2024 1741)  Fever Reduction/Comfort Measures:   lightweight bedding   lightweight clothing  Infection Management: aseptic technique maintained  Isolation Precautions:   precautions maintained   protective

## 2024-03-06 NOTE — PLAN OF CARE
Weekly Staffing Report      Date Admitted: 2/26/2024 :   Staffing Date: 3/6/2024     Patient Active Problem List   Diagnosis    Uncontrolled type 2 diabetes mellitus with hyperglycemia    Atrial flutter    Constipation    Abnormal urinalysis    Obstructive sleep apnea syndrome    Acute deep vein thrombosis (DVT) of brachial vein of right upper extremity    Quadriplegia    Intolerance of continuous positive airway pressure (CPAP) ventilation    Complex sleep apnea syndrome    Open wound of left hip    Pressure injury of right ischium, stage 4    Osteomyelitis    Sacral decubitus ulcer, stage II    Chronic blood loss anemia    Infected pressure ulcer, unspecified pressure ulcer stage          Team Members Present:       Nursing Present     Yes [x]    No []    Physical Therapy Present    Yes [x]    No []    Occupational Therapy Present     Yes [x]    No []    Speech Therapy Present    Yes []    No []    NA [x]    Dietary Present    Yes [x]    No []        Physician Present     [x] Thairy Reyes    [] Jeanette Mann    [x] ANTHONY Howe    []       Family Present    [] Adult Children    [] Spouse    [] POA    [] Friend/ Caregiver    [] Other       Interdisciplinary Meeting Notes:  Family at bedside. PT- ROM exercises will try to sit up in chair tomorrow, will monitor wound Vac. Appetite- fair. Increase intake for wound healing . Medically- discussed blood sugars and adjusting insulin while fighting infection. Pain medication helping. Plan to discharge home with home health when ready. All questions answered at this time                Please see Documented PT/OT/ST, Dietary, Nursing, and Physician notes for detailed treatment information.

## 2024-03-06 NOTE — PATIENT CARE CONFERENCE
Name: Antonio Galvan II    : 1965 (58 y.o.)  MRN: [id number]            Interdisciplinary Team Conference     Case conference held with patient/family and care team to discuss progress, plan of care, barriers to be addressed for safe return home, equipment recommendations, and discharge planning. Communicated therapy progress with MD, RN, therapy clinicians and case management. All questions/concerns answered.

## 2024-03-06 NOTE — PLAN OF CARE
Problem: Adult Inpatient Plan of Care  Goal: Plan of Care Review  Outcome: Ongoing, Progressing  Flowsheets (Taken 3/6/2024 0115)  Plan of Care Reviewed With:   patient   father  Goal: Patient-Specific Goal (Individualized)  Outcome: Ongoing, Progressing  Flowsheets (Taken 3/6/2024 0115)  Anxieties, Fears or Concerns: none expressed  Individualized Care Needs: monitor cbg, safety, turn 2Q  Goal: Absence of Hospital-Acquired Illness or Injury  Outcome: Ongoing, Progressing  Intervention: Prevent Skin Injury  Flowsheets (Taken 3/5/2024 2000)  Skin Protection: adhesive use limited  Intervention: Prevent and Manage VTE (Venous Thromboembolism) Risk  Flowsheets (Taken 3/5/2024 2000)  Range of Motion: ROM (range of motion) performed  Intervention: Prevent Infection  Flowsheets (Taken 3/5/2024 2000)  Infection Prevention:   cohorting utilized   personal protective equipment utilized   rest/sleep promoted  Goal: Optimal Comfort and Wellbeing  Outcome: Ongoing, Progressing  Intervention: Monitor Pain and Promote Comfort  Flowsheets (Taken 3/6/2024 0115)  Pain Management Interventions: care clustered  Intervention: Provide Person-Centered Care  Flowsheets (Taken 3/6/2024 0115)  Trust Relationship/Rapport:   care explained   choices provided   questions answered   questions encouraged   reassurance provided   thoughts/feelings acknowledged  Goal: Readiness for Transition of Care  Outcome: Ongoing, Progressing  Intervention: Mutually Develop Transition Plan  Flowsheets (Taken 3/6/2024 0115)  Equipment Currently Used at Home:   lift device   hospital bed   power chair  Transportation Anticipated: family or friend will provide  Who are your caregiver(s) and their phone number(s)?: [phone] Judy (mother)  Communicated SADE with patient/caregiver: Date not available/Unable to determine  Readmission within 30 days?: No  Do you currently have service(s) that help you manage your care at home?: No  Is the pt/caregiver preference  to resume services with current agency: No     Problem: Diabetes Comorbidity  Goal: Blood Glucose Level Within Targeted Range  Outcome: Ongoing, Progressing  Intervention: Monitor and Manage Glycemia  Flowsheets (Taken 3/6/2024 0115)  Glycemic Management: blood glucose monitored     Problem: Skin Injury Risk Increased  Goal: Skin Health and Integrity  Outcome: Ongoing, Progressing  Intervention: Optimize Skin Protection  Flowsheets (Taken 3/6/2024 0115)  Pressure Reduction Techniques: frequent weight shift encouraged  Pressure Reduction Devices:   alternating pressure pump (ADD)   heel offloading device utilized   positioning supports utilized   specialty bed utilized  Skin Protection: adhesive use limited  Head of Bed (HOB) Positioning: HOB at 20-30 degrees  Intervention: Promote and Optimize Oral Intake  Flowsheets (Taken 3/6/2024 0115)  Oral Nutrition Promotion: calorie-dense foods provided     Problem: Impaired Wound Healing  Goal: Optimal Wound Healing  Outcome: Ongoing, Progressing  Intervention: Promote Wound Healing  Flowsheets (Taken 3/6/2024 0115)  Oral Nutrition Promotion: calorie-dense foods provided  Sleep/Rest Enhancement:   awakenings minimized   noise level reduced   regular sleep/rest pattern promoted   relaxation techniques promoted  Activity Management: Rolling - L1  Pain Management Interventions: care clustered     Problem: Fall Injury Risk  Goal: Absence of Fall and Fall-Related Injury  Outcome: Ongoing, Progressing  Intervention: Identify and Manage Contributors  Flowsheets (Taken 3/6/2024 0115)  Self-Care Promotion: safe use of adaptive equipment encouraged  Medication Review/Management: medications reviewed  Intervention: Promote Injury-Free Environment  Flowsheets (Taken 3/6/2024 0115)  Safety Promotion/Fall Prevention:   assistive device/personal item within reach   bed alarm set   family to remain at bedside     Problem: Infection  Goal: Absence of Infection Signs and Symptoms  Outcome:  Ongoing, Progressing  Intervention: Prevent or Manage Infection  Flowsheets (Taken 3/6/2024 0115)  Fever Reduction/Comfort Measures:   lightweight bedding   lightweight clothing  Infection Management: aseptic technique maintained  Isolation Precautions:   contact   protective

## 2024-03-07 LAB
GLUCOSE SERPL-MCNC: 211 MG/DL (ref 70–110)
GLUCOSE SERPL-MCNC: 220 MG/DL (ref 70–110)
GLUCOSE SERPL-MCNC: 252 MG/DL (ref 70–110)
GLUCOSE SERPL-MCNC: 257 MG/DL (ref 70–110)
GLUCOSE SERPL-MCNC: 261 MG/DL (ref 70–110)
PREALB SERPL-MCNC: 23.7 MG/DL (ref 18–45)

## 2024-03-07 PROCEDURE — 99900035 HC TECH TIME PER 15 MIN (STAT)

## 2024-03-07 PROCEDURE — 25000003 PHARM REV CODE 250: Performed by: STUDENT IN AN ORGANIZED HEALTH CARE EDUCATION/TRAINING PROGRAM

## 2024-03-07 PROCEDURE — 25000003 PHARM REV CODE 250: Performed by: INTERNAL MEDICINE

## 2024-03-07 PROCEDURE — 25000003 PHARM REV CODE 250: Performed by: PHYSICIAN ASSISTANT

## 2024-03-07 PROCEDURE — 11000004 HC SNF PRIVATE

## 2024-03-07 PROCEDURE — 84134 ASSAY OF PREALBUMIN: CPT | Performed by: PHYSICIAN ASSISTANT

## 2024-03-07 PROCEDURE — 97606 NEG PRS WND THER DME>50 SQCM: CPT

## 2024-03-07 PROCEDURE — 63600175 PHARM REV CODE 636 W HCPCS: Performed by: STUDENT IN AN ORGANIZED HEALTH CARE EDUCATION/TRAINING PROGRAM

## 2024-03-07 PROCEDURE — 94760 N-INVAS EAR/PLS OXIMETRY 1: CPT

## 2024-03-07 PROCEDURE — 63600175 PHARM REV CODE 636 W HCPCS: Performed by: PHYSICIAN ASSISTANT

## 2024-03-07 PROCEDURE — 27000207 HC ISOLATION

## 2024-03-07 PROCEDURE — A4216 STERILE WATER/SALINE, 10 ML: HCPCS | Performed by: INTERNAL MEDICINE

## 2024-03-07 PROCEDURE — 97530 THERAPEUTIC ACTIVITIES: CPT

## 2024-03-07 RX ADMIN — HYDROCODONE BITARTRATE AND ACETAMINOPHEN 1 TABLET: 7.5; 325 TABLET ORAL at 03:03

## 2024-03-07 RX ADMIN — INSULIN ASPART 15 UNITS: 100 INJECTION, SOLUTION INTRAVENOUS; SUBCUTANEOUS at 03:03

## 2024-03-07 RX ADMIN — ACETAMINOPHEN 650 MG: 325 TABLET ORAL at 09:03

## 2024-03-07 RX ADMIN — INSULIN ASPART 6 UNITS: 100 INJECTION, SOLUTION INTRAVENOUS; SUBCUTANEOUS at 05:03

## 2024-03-07 RX ADMIN — Medication 1 CAPSULE: at 08:03

## 2024-03-07 RX ADMIN — PANTOPRAZOLE SODIUM 40 MG: 40 TABLET, DELAYED RELEASE ORAL at 09:03

## 2024-03-07 RX ADMIN — CETIRIZINE HYDROCHLORIDE 10 MG: 10 TABLET, FILM COATED ORAL at 09:03

## 2024-03-07 RX ADMIN — OXYCODONE HYDROCHLORIDE AND ACETAMINOPHEN 500 MG: 500 TABLET ORAL at 09:03

## 2024-03-07 RX ADMIN — INSULIN ASPART 15 UNITS: 100 INJECTION, SOLUTION INTRAVENOUS; SUBCUTANEOUS at 08:03

## 2024-03-07 RX ADMIN — FERROUS SULFATE TAB 325 MG (65 MG ELEMENTAL FE) 1 EACH: 325 (65 FE) TAB at 09:03

## 2024-03-07 RX ADMIN — HYDROCODONE BITARTRATE AND ACETAMINOPHEN 1 TABLET: 7.5; 325 TABLET ORAL at 09:03

## 2024-03-07 RX ADMIN — Medication 1 CAPSULE: at 11:03

## 2024-03-07 RX ADMIN — SITAGLIPTIN 100 MG: 50 TABLET, FILM COATED ORAL at 09:03

## 2024-03-07 RX ADMIN — LINEZOLID 600 MG: 600 TABLET, FILM COATED ORAL at 09:03

## 2024-03-07 RX ADMIN — SODIUM CHLORIDE, PRESERVATIVE FREE 10 ML: 5 INJECTION INTRAVENOUS at 11:03

## 2024-03-07 RX ADMIN — COLLAGENASE SANTYL: 250 OINTMENT TOPICAL at 09:03

## 2024-03-07 RX ADMIN — INSULIN ASPART 15 UNITS: 100 INJECTION, SOLUTION INTRAVENOUS; SUBCUTANEOUS at 11:03

## 2024-03-07 RX ADMIN — MICONAZOLE NITRATE 2 % TOPICAL POWDER: at 09:03

## 2024-03-07 RX ADMIN — INSULIN DETEMIR 20 UNITS: 100 INJECTION, SOLUTION SUBCUTANEOUS at 09:03

## 2024-03-07 RX ADMIN — SODIUM CHLORIDE, PRESERVATIVE FREE 10 ML: 5 INJECTION INTRAVENOUS at 05:03

## 2024-03-07 RX ADMIN — TOBRAMYCIN 400 MG: 40 INJECTION INTRAMUSCULAR; INTRAVENOUS at 09:03

## 2024-03-07 RX ADMIN — SODIUM CHLORIDE, PRESERVATIVE FREE 10 ML: 5 INJECTION INTRAVENOUS at 12:03

## 2024-03-07 RX ADMIN — Medication 1 CAPSULE: at 04:03

## 2024-03-07 NOTE — PT/OT/SLP PROGRESS
Physical Therapy Treatment Note           Name: Antonio Galvan II    : 1965 (58 y.o.)  MRN: [id number]           TREATMENT SUMMARY AND RECOMMENDATIONS:    PT Received On: 24  PT Start Time: 1440     PT Stop Time: 1530  PT Total Time (min): 50 min     Subjective Assessment:   No complaints  Lethargic   x Awake, alert, cooperative  Uncooperative    Agitated  c/o pain    Appropriate  c/o fatigue    Confused x Treated at bedside     Emotionally labile  Treated in gym/dept.    Impulsive  Other:    Flat affect       Therapy Precautions:    Cognitive deficits  Spinal precautions    Collar - hard  Sternal precautions    Collar - soft   TLSO    Fall risk  LSO    Hip precautions - posterior  Knee immobilizer    Hip precautions - anterior  WBAT    Impaired communication  Partial weightbearing    Oxygen  TTWB    PEG tube  NWB    Visual deficits x Other: Wound Vac, super pubic cath.     Hearing deficits          Treatment Objectives:     Mobility Training:   Assist level Comments    Bed mobility Total A PT assisted nursing during wound care, providing assistance with rolling, position changes, sustained holding/stretching for adequate access to wounds. Proper positioning provided at completion of ROM tasks to ensure joint alignment and reduce skin break down   Transfer     Gait     Sit to stand transitions     Sitting balance     Standing balance      Wheelchair mobility     Car transfer     Other:          Therapeutic Exercise:   Exercise Sets Reps Comments   PROM to B UE and LE all joints  20 All functional planes to maintain and improve mobility to maximize positioning ability to reduce skin break down.    PROM to cervical spine + STM to cervical spine  5'                    Additional Comments:  Pt demonstrating pain symptoms with redness to his face and sweating during R side wound care - pt tolerated L side wound care better.     Plans to sit EOB tomorrow     Assessment: Patient tolerated session  better - pt in better spirits     PT Plan: continue per POC  Revisions made to plan of care: No    GOALS:   Multidisciplinary Problems       Physical Therapy Goals          Problem: Physical Therapy    Goal Priority Disciplines Outcome Goal Variances Interventions   Physical Therapy Goal     PT, PT/OT Ongoing, Progressing     Description: Goals to be met by: Discharge     Pt to be seen for ROM tasks QD to ensure proper positioning can be obtained to overall reduce impacts of prolonged bed rest and skin break down  Progress pending healing stages of current wounds    Patient will increase functional independence with mobility by performin. Pt to tolerate PROM tasks to B UE and LE, while obtaining 25% improvement in range.   2. Sitting EOB to tolerance pending wound healing - with pt demonstrating normal BP  3. Progress to OOB into chair once able.                          Skilled PT Minutes Provided: 40   Communication with Treatment Team:     Equipment recommendations:       At end of treatment, patient remained:   Up in chair     Up in wheelchair in room   x In bed    With alarm activated    Bed rails up   x Call bell in reach    x Family/friends present    Restraints secured properly    In bathroom with CNA/RN notified   x Nurse aware    In gym with therapist/tech    Other:

## 2024-03-07 NOTE — NURSING
Nurses Note -- 4 Eyes      3/6/2024   10:37 PM      Skin assessed during: Daily Assessment      [x] No Altered Skin Integrity Present    []Prevention Measures Documented      [] Yes- Altered Skin Integrity Present or Discovered   [] LDA Added if Not in Epic (Describe Wound)   [] New Altered Skin Integrity was Present on Admit and Documented in LDA   [] Wound Image Taken    Wound Care Consulted? No    Attending Nurse:  Loraine Montano RN/Staff Member:  Shivam MADDOX

## 2024-03-07 NOTE — PLAN OF CARE
[facility] - [facility]  Discharge Reassessment    Primary Care Provider: Jennifer Fernando NP    Expected Discharge Date:     Reassessment (most recent)       Discharge Reassessment - 03/07/24 6315          Discharge Reassessment    Assessment Type Discharge Planning Reassessment     Did the patient's condition or plan change since previous assessment? No     Discharge Plan discussed with: Patient     Communicated SADE with patient/caregiver Date not available/Unable to determine     Discharge Plan A Home with family;Home Health     DME Needed Upon Discharge  none     Transition of Care Barriers None     Why the patient remains in the hospital Requires continued medical care        Post-Acute Status    Post-Acute Authorization Home Health     Home Health Status Pending medical clearance/testing     Hospital Resources/Appts/Education Provided Provided patient/caregiver with written discharge plan information     Discharge Delays None known at this time

## 2024-03-07 NOTE — PROGRESS NOTES
Inpatient Nutrition Evaluation    Admit Date: 2/26/2024   Total duration of encounter: 10 days   Patient Age: 58 y.o.    Nutrition Recommendation/Prescription   Continue regular diet. Adjust diet, per patient request. Requesting sugar free.  2. Continue Arginaid 1 pk BID for wound healing 3. Continue ascorbic acid 500 mg PO daily 4. Weekly weights 5. Monitor intake, wt, labs, medications.       Nutrition Assessment     Chart Review    Reason Seen: continuous nutrition monitoring, length of stay, and follow-up    Malnutrition Screening Tool Results   Have you recently lost weight without trying?: Yes: 34 lbs or more  Have you been eating poorly because of a decreased appetite?: Yes   MST Score: 5   Diagnosis:  Infected wounds/sacrum, abd, feet  Klebsiella pneumoniae, Pseudomonas aeruginosa, Proteus mirabilis  DM,   Hypotension  Hyperglycemia  VRE  A-fib       Relevant Medical History:     Cardiac arrest         7/2022    Chronic skin ulcer      DM (diabetes mellitus)      Infected decubitus ulcer      Lymphedema of leg      Neurogenic bladder      Obesity, unspecified      Pressure ulcer of heel      Pressure ulcer of right foot, stage 3      Pressure ulcer of right ischium      Quadriplegia      Quadriplegic spinal paralysis        Scheduled Medications:  acetaminophen, 650 mg, QHS  ascorbic acid (vitamin C), 500 mg, Daily  cetirizine, 10 mg, Daily  collagenase, , Daily  ferrous sulfate, 1 tablet, Daily  insulin aspart U-100, 15 Units, TIDWM  insulin detemir U-100, 20 Units, BID  Lactobacillus acidophilus, 1 capsule, TID WM  linezolid, 600 mg, Q12H  miconazole NITRATE 2 %, , BID  pantoprazole, 40 mg, Daily  SITagliptin phosphate, 100 mg, Daily  sodium chloride 0.9%, 10 mL, Q6H  tobramycin IV (PEDS and ADULTS), 400 mg, Q36H    Continuous Infusions:   PRN Medications: 0.9%  NaCl infusion (for blood administration), 0.9%  NaCl infusion (for blood administration), sodium chloride 0.9%, acetaminophen, albuterol,  benzonatate, bisacodyL, budesonide, calcium carbonate, dextrose 10%, dextrose 10%, diphenhydrAMINE, glucagon (human recombinant), glucose, glucose, hydrALAZINE, HYDROcodone-acetaminophen, HYDROcodone-acetaminophen, hydrOXYzine pamoate, insulin aspart U-100, loperamide, melatonin, metoprolol, ondansetron, simethicone, Flushing PICC/Midline Protocol **AND** sodium chloride 0.9% **AND** sodium chloride 0.9%, zolpidem    Recent Labs   Lab 03/01/24  0505 03/02/24  0541 03/04/24  0525 03/06/24  0517 03/07/24  1157   * 136 139 134*  --    K 4.9 4.9 4.6 4.8  --    CALCIUM 8.3* 8.3* 8.2* 8.3*  --    MG  --   --  1.80  --   --    CHLORIDE 104 104 105 102  --    CO2 20* 21* 21* 22  --    BUN 19.0 19.9 25.0 29.9*  --    CREATININE 0.87 0.92 0.98 0.96  --    EGFRNORACEVR >60 >60 >60 >60  --    GLUCOSE 275* 250* 396* 329*  --    PREALB  --   --   --   --  23.7   WBC 8.90 8.99 9.04 8.76  --    HGB 10.7* 11.1* 11.4* 10.8*  --    HCT 33.6* 35.1* 36.2* 34.9*  --      Nutrition Orders:  Diet Adult Regular  Dietary nutrition supplements Arginaid - Any flavor; BID    Appetite/Oral Intake: good/% of meals  Factors Affecting Nutritional Intake: multiple wounds, (see wound care nurse notes and pics on 3/4/24)  Food/Yazdanism/Cultural Preferences: none reported  Food Allergies: none reported  Last Bowel Movement: 03/04/24  Wound(s):     Altered Skin Integrity 05/06/22 1140 Right Heel  Full thickness tissue loss. Subcutaneous fat may be visible but bone, tendon or muscle are not exposed-Tissue loss description: Full thickness       Altered Skin Integrity 01/12/23 1530 Sacral spine Full thickness tissue loss with exposed bone, tendon, or muscle. Often includes undermining and tunneling. May extend into muscle and/or supporting structures.-Tissue loss description: Full thickness       Altered Skin Integrity 08/01/23 1535 Left anterior Ankle Other (comment) Full thickness tissue loss. Base is covered by slough and/or eschar in the  "wound bed-Tissue loss description: Full thickness     Comments    Pt reports intake is good. Pt gets outside foods. Discussed food preferences. Marked menus. Pt reports wound care nurse coming today to change wound vac.   Continue current medical nutrition therapy.     Anthropometrics    Height: 5' 7" (170.2 cm), Height Method: Stated  Last Weight: 95 kg (209 lb 7 oz) (03/04/24 0500), Weight Method: Bed Scale  BMI (Calculated): 32.8  BMI Classification: obese grade I (BMI 30-34.9)        Ideal Body Weight (IBW), Male: 148 lb     % Ideal Body Weight, Male (lb): 139.86 %                          Usual Weight Provided By: unable to obtain usual weight    Wt Readings from Last 5 Encounters:   03/04/24 95 kg (209 lb 7 oz)   02/27/24 93.9 kg (207 lb 0.2 oz)   02/23/24 93.9 kg (207 lb)   08/16/23 106.4 kg (234 lb 9.6 oz)   08/15/23 113.4 kg (250 lb)     Weight Change(s) Since Admission: :as weight recorded on 3/4/24  Wt Readings from Last 1 Encounters:   03/04/24 0500 95 kg (209 lb 7 oz)   02/26/24 1500 93.9 kg (207 lb)   Admit Weight: 93.9 kg (207 lb) (02/26/24 1500), Weight Method: Bed Scale (Used bed weight from admission 2/23/24 as his bed does not have a scale)    Patient Education     Not applicable.    Nutrition Goals & Monitoring     Dietitian will monitor: food and beverage intake, weight, weight change, glucose/endocrine profile, and gastrointestinal profile    Nutrition Risk/Follow-Up: low (follow-up in 5-7 days)  Patients assigned 'low nutrition risk' status do not qualify for a full nutritional assessment but will be monitored and re-evaluated in a 5-7 day time period. Please consult if re-evaluation needed sooner.  "

## 2024-03-07 NOTE — PT/OT/SLP PROGRESS
Physical Therapy Treatment Note           Name: Antonio Galvan II    : 1965 (58 y.o.)  MRN: [id number]           TREATMENT SUMMARY AND RECOMMENDATIONS:    PT Received On: 24  PT Start Time: 1422     PT Stop Time: 1500  PT Total Time (min): 38 min     Subjective Assessment:   No complaints  Lethargic   x Awake, alert, cooperative  Uncooperative    Agitated  c/o pain    Appropriate  c/o fatigue    Confused x Treated at bedside     Emotionally labile  Treated in gym/dept.    Impulsive  Other:    Flat affect       Therapy Precautions:    Cognitive deficits  Spinal precautions    Collar - hard  Sternal precautions    Collar - soft   TLSO    Fall risk  LSO    Hip precautions - posterior  Knee immobilizer    Hip precautions - anterior  WBAT    Impaired communication  Partial weightbearing    Oxygen  TTWB    PEG tube  NWB    Visual deficits x Other: Wound Vac, super pubic cath.     Hearing deficits          Treatment Objectives:     Mobility Training:   Assist level Comments    Bed mobility Total A Rolling side to side and scooting trunk for sheet placements.  Proper positioning provided at completion of tx tasks to ensure joint alignment and reduce skin break down   Transfer Total A Supine<>SIt   Gait     Sit to stand transitions     Sitting balance MAX A Sitting EOB x 15 min with monitoring of BP.    Standing balance      Wheelchair mobility     Car transfer     Other:          Therapeutic Exercise:   Exercise Sets Reps Comments                               Additional Comments:  BP upon sitting was 189/114, with breathing and relaxation techniques pt dropped down to 169/93. No issues noted at wound sites. PT to consult with wound care tomorrow to ensure wounds tolerate pressures of sitting      Assessment: Patient tolerated session well, able to complete sitting EOB with improvement in BP demonstrated.      PT Plan: continue per POC  Revisions made to plan of care: No    GOALS:    Multidisciplinary Problems       Physical Therapy Goals          Problem: Physical Therapy    Goal Priority Disciplines Outcome Goal Variances Interventions   Physical Therapy Goal     PT, PT/OT Ongoing, Progressing     Description: Goals to be met by: Discharge     Pt to be seen for ROM tasks QD to ensure proper positioning can be obtained to overall reduce impacts of prolonged bed rest and skin break down  Progress pending healing stages of current wounds    Patient will increase functional independence with mobility by performin. Pt to tolerate PROM tasks to B UE and LE, while obtaining 25% improvement in range.   2. Sitting EOB to tolerance pending wound healing - with pt demonstrating normal BP  3. Progress to OOB into chair once able.                          Skilled PT Minutes Provided: 38   Communication with Treatment Team:     Equipment recommendations:       At end of treatment, patient remained:   Up in chair     Up in wheelchair in room   x In bed    With alarm activated    Bed rails up   x Call bell in reach    x Family/friends present    Restraints secured properly    In bathroom with CNA/RN notified   x Nurse aware    In gym with therapist/tech    Other:

## 2024-03-07 NOTE — PROGRESS NOTES
[facility] - [facility]  [facility] MEDICINE ~ PROGRESS NOTE  CHIEF COMPLAINT   Hospital follow up for nonhealing wound    HOSPITAL COURSE     58-year-old man with quadriplegic spinal paralysis and numerous decubitus ulcers who is followed by wound care is brought in today due to fever. He had wound cultures done 3 days ago that have grown Proteus mirabilis and Klebsiella pneumoniae. Sensitivities are not complete. The wounds were noted to have significant odor and heavy green drainage. In addition the patient had some nausea and vomiting and home health noted that his blood pressure was dipping. He was advised to come to the emergency room. Wound cultures were drawn from the right hip wound. His wife reports that when the wound is pressed above pus comes out. Both Klebsiella and Proteus maybe treated with fluoroquinolones which are both IV and oral. The patient's wife preferred if the patient came home so that she could attend to his wounds however the patient expressed a desire to be admitted.     Today:  No reported complaints today. Planning for wound vac change today. Informed nurse to premedicate if possible. Glucose control continues to be an issue.     OBJECTIVE/PHYSICAL EXAM     VITAL SIGNS (MOST RECENT):  Temp: 98.7 °F (37.1 °C) (03/07/24 0824)  Pulse: (!) 132 (03/07/24 0824)  Resp: 20 (03/07/24 0930)  BP: (!) 160/95 (03/07/24 0824)  SpO2: 100 % (03/07/24 0350) VITAL SIGNS (24 HOUR RANGE):  Temp:  [97.7 °F (36.5 °C)-98.7 °F (37.1 °C)] 98.7 °F (37.1 °C)  Pulse:  [] 132  Resp:  [18-20] 20  SpO2:  [97 %-100 %] 100 %  BP: ()/(61-97) 160/95     GENERAL: In no acute distress, afebrile  HEENT:  PERRLA  CHEST: Clear to auscultation bilaterally  HEART: S1, S2, no appreciable murmur  ABDOMEN: Soft, nontender, BS +  MSK: Warm, no lower extremity edema, no clubbing or cyanosis  NEUROLOGIC: Alert and oriented x4  INTEGUMENTARY:  See media for wounds    ASSESSMENT/PLAN     Nonhealing chronic  wound   VRE, Proteus, Pseudomonas, Klebsiella  S/p debridement with Dr. Jean on 02/27/2024  Zyvox and Tobramycin - will need 6 weeks of treatment given exposed bone starting 3/2-4/12  Wound VAC change today      Post op anemia   H&H improved s/p 2u pRBC 02/28/2024 and 1u pRBC on 02/29/2024  No anticoagulation at this time - aspirin d/c 02/28/2024     Hyperglycemia in setting of DM  A1c 02/23/2024 8.5%  Reports taking 80u Levemir BID  Continue home Sitagliptin  Continue to titrate long and short-acting insulin     Hypotension - resolved      Hx of: Neurogenic bladder, Quadriplegia     DVT prophylaxis:  SCDs, history of bleeding in the recent past    Anticipated discharge and disposition:  Can potentially consider home antibiotics however many limiting factors including wound VAC and regular wound care  __________________________________________________________________________    LABS/MICRO/MEDS/DIAGNOSTICS     LABS  Recent Labs     03/06/24  0517   *   K 4.8   CHLORIDE 102   CO2 22   BUN 29.9*   CREATININE 0.96   GLUCOSE 329*   CALCIUM 8.3*     Recent Labs     03/06/24  0517   WBC 8.76   RBC 3.95*   HCT 34.9*   MCV 88.4        MICROBIOLOGY  Microbiology Results (last 7 days)       ** No results found for the last 168 hours. **             MEDICATIONS   acetaminophen  650 mg Oral QHS    ascorbic acid (vitamin C)  500 mg Oral Daily    cetirizine  10 mg Oral Daily    collagenase   Topical (Top) Daily    ferrous sulfate  1 tablet Oral Daily    insulin aspart U-100  15 Units Subcutaneous TIDWM    insulin detemir U-100  20 Units Subcutaneous BID    Lactobacillus acidophilus  1 capsule Oral TID WM    linezolid  600 mg Oral Q12H    miconazole NITRATE 2 %   Topical (Top) BID    pantoprazole  40 mg Oral Daily    SITagliptin phosphate  100 mg Oral Daily    sodium chloride 0.9%  10 mL Intravenous Q6H    tobramycin IV (PEDS and ADULTS)  400 mg Intravenous Q36H         INFUSIONS       DIAGNOSTIC TESTS  X-Ray  Chest 1 View   Final Result      Left PICC tip overlies the mid SVC.         Electronically signed by: Oj Solomon   Date:    02/28/2024   Time:    14:29           EF   Date Value Ref Range Status   05/09/2023 60 % Final   07/18/2022 40 % Final        NUTRITION STATUS  Patient meets ASPEN criteria for   malnutrition of   per RD assessment as evidenced by:                       A minimum of two characteristics is recommended for diagnosis of either severe or non-severe malnutrition.     Case related differential diagnoses have been reviewed; assessment and plan has been documented. I have personally reviewed the labs and test results that are currently available; I have reviewed the patients medication list. I have reviewed the consulting providers recommendations. I have reviewed or attempted to review medical records based upon their availability.  All of the patient's and/or family's questions have been addressed and answered to the best of my ability.  I will continue to monitor closely and make adjustments to medical management as needed.  This document was created using M*Modal Fluency Direct.  Transcription errors may have been made.  Please contact me if any questions may rise regarding documentation to clarify transcription.      ANTHONY Vazquez   Internal Medicine  Department of Hospital Medicine [facility] - [facility]

## 2024-03-07 NOTE — PLAN OF CARE
Problem: Adult Inpatient Plan of Care  Goal: Plan of Care Review  Outcome: Ongoing, Progressing  Flowsheets (Taken 3/7/2024 1057)  Plan of Care Reviewed With: patient  Goal: Patient-Specific Goal (Individualized)  Outcome: Ongoing, Progressing  Flowsheets (Taken 3/7/2024 1057)  Anxieties, Fears or Concerns: concern about bowel regimen disturbing wound vac on sacrum     Problem: Diabetes Comorbidity  Goal: Blood Glucose Level Within Targeted Range  Outcome: Ongoing, Progressing  Intervention: Monitor and Manage Glycemia  Flowsheets (Taken 3/7/2024 1057)  Glycemic Management: blood glucose monitored     Problem: Skin Injury Risk Increased  Goal: Skin Health and Integrity  Outcome: Ongoing, Progressing  Intervention: Optimize Skin Protection  Flowsheets (Taken 3/7/2024 1057)  Pressure Reduction Techniques: frequent weight shift encouraged  Pressure Reduction Devices: foam padding utilized  Skin Protection:   adhesive use limited   tubing/devices free from skin contact  Head of Bed (HOB) Positioning: HOB at 45 degrees  Intervention: Promote and Optimize Oral Intake  Flowsheets (Taken 3/7/2024 1057)  Oral Nutrition Promotion: calorie-dense foods provided     Problem: Impaired Wound Healing  Goal: Optimal Wound Healing  Outcome: Ongoing, Progressing  Intervention: Promote Wound Healing  Flowsheets (Taken 3/7/2024 1057)  Oral Nutrition Promotion: calorie-dense foods provided  Sleep/Rest Enhancement:   awakenings minimized   natural light exposure provided   relaxation techniques promoted  Activity Management: Rolling - L1  Pain Management Interventions:   care clustered   quiet environment facilitated     Problem: Fall Injury Risk  Goal: Absence of Fall and Fall-Related Injury  Outcome: Ongoing, Progressing  Intervention: Identify and Manage Contributors  Flowsheets (Taken 3/7/2024 1057)  Self-Care Promotion:   meal set-up provided   BADL personal routines maintained   safe use of adaptive equipment encouraged   BADL  personal objects within reach  Medication Review/Management: medications reviewed  Intervention: Promote Injury-Free Environment  Flowsheets (Taken 3/7/2024 1050)  Safety Promotion/Fall Prevention:   assistive device/personal item within reach   bed alarm set   side rails raised x 2   medications reviewed   diversional activities provided

## 2024-03-08 LAB
ADDITIONAL FINDINGS (OHS): NORMAL
ANION GAP SERPL CALC-SCNC: 12 MEQ/L
BASOPHILS # BLD AUTO: 0.05 X10(3)/MCL
BASOPHILS NFR BLD AUTO: 0.5 %
BUN SERPL-MCNC: 34.9 MG/DL (ref 8.4–25.7)
CALCIUM SERPL-MCNC: 8.6 MG/DL (ref 8.4–10.2)
CHLORIDE SERPL-SCNC: 105 MMOL/L (ref 98–107)
CO2 SERPL-SCNC: 20 MMOL/L (ref 22–29)
CREAT SERPL-MCNC: 1.14 MG/DL (ref 0.73–1.18)
CREAT/UREA NIT SERPL: 31
EOSINOPHIL # BLD AUTO: 0.61 X10(3)/MCL (ref 0–0.9)
EOSINOPHIL NFR BLD AUTO: 6.1 %
ERYTHROCYTE [DISTWIDTH] IN BLOOD BY AUTOMATED COUNT: 16.8 % (ref 11.5–17)
GFR SERPLBLD CREATININE-BSD FMLA CKD-EPI: >60 MLS/MIN/1.73/M2
GLUCOSE SERPL-MCNC: 204 MG/DL (ref 74–100)
GLUCOSE SERPL-MCNC: 218 MG/DL (ref 70–110)
GLUCOSE SERPL-MCNC: 220 MG/DL (ref 70–110)
GLUCOSE SERPL-MCNC: 224 MG/DL (ref 70–110)
HCT VFR BLD AUTO: 35.1 % (ref 42–52)
HGB BLD-MCNC: 11.2 G/DL (ref 14–18)
IMM GRANULOCYTES # BLD AUTO: 0.06 X10(3)/MCL (ref 0–0.04)
IMM GRANULOCYTES NFR BLD AUTO: 0.6 %
LYMPHOCYTES # BLD AUTO: 2.85 X10(3)/MCL (ref 0.6–4.6)
LYMPHOCYTES NFR BLD AUTO: 28.5 %
MCH RBC QN AUTO: 27.2 PG (ref 27–31)
MCHC RBC AUTO-ENTMCNC: 31.9 G/DL (ref 33–36)
MCV RBC AUTO: 85.2 FL (ref 80–94)
MONOCYTES # BLD AUTO: 0.48 X10(3)/MCL (ref 0.1–1.3)
MONOCYTES NFR BLD AUTO: 4.8 %
NEUTROPHILS # BLD AUTO: 5.94 X10(3)/MCL (ref 2.1–9.2)
NEUTROPHILS NFR BLD AUTO: 59.5 %
PLATELET # BLD AUTO: 280 X10(3)/MCL (ref 130–400)
PLATELET # BLD EST: NORMAL 10*3/UL
PMV BLD AUTO: 11.1 FL (ref 7.4–10.4)
POTASSIUM SERPL-SCNC: 4.6 MMOL/L (ref 3.5–5.1)
POTASSIUM SERPL-SCNC: 5.2 MMOL/L (ref 3.5–5.1)
RBC # BLD AUTO: 4.12 X10(6)/MCL (ref 4.7–6.1)
RBC MORPH BLD: NORMAL
SODIUM SERPL-SCNC: 137 MMOL/L (ref 136–145)
WBC # SPEC AUTO: 9.99 X10(3)/MCL (ref 4.5–11.5)

## 2024-03-08 PROCEDURE — 94760 N-INVAS EAR/PLS OXIMETRY 1: CPT

## 2024-03-08 PROCEDURE — 99900035 HC TECH TIME PER 15 MIN (STAT)

## 2024-03-08 PROCEDURE — 63600175 PHARM REV CODE 636 W HCPCS: Performed by: PHYSICIAN ASSISTANT

## 2024-03-08 PROCEDURE — 25000003 PHARM REV CODE 250: Performed by: INTERNAL MEDICINE

## 2024-03-08 PROCEDURE — 27000207 HC ISOLATION

## 2024-03-08 PROCEDURE — 63600175 PHARM REV CODE 636 W HCPCS: Performed by: STUDENT IN AN ORGANIZED HEALTH CARE EDUCATION/TRAINING PROGRAM

## 2024-03-08 PROCEDURE — 25000003 PHARM REV CODE 250: Performed by: PHYSICIAN ASSISTANT

## 2024-03-08 PROCEDURE — 11000004 HC SNF PRIVATE

## 2024-03-08 PROCEDURE — 80048 BASIC METABOLIC PNL TOTAL CA: CPT | Performed by: PHYSICIAN ASSISTANT

## 2024-03-08 PROCEDURE — 97530 THERAPEUTIC ACTIVITIES: CPT

## 2024-03-08 PROCEDURE — A4216 STERILE WATER/SALINE, 10 ML: HCPCS | Performed by: INTERNAL MEDICINE

## 2024-03-08 PROCEDURE — 84132 ASSAY OF SERUM POTASSIUM: CPT | Performed by: PHYSICIAN ASSISTANT

## 2024-03-08 PROCEDURE — 25000003 PHARM REV CODE 250: Performed by: STUDENT IN AN ORGANIZED HEALTH CARE EDUCATION/TRAINING PROGRAM

## 2024-03-08 PROCEDURE — 85025 COMPLETE CBC W/AUTO DIFF WBC: CPT | Performed by: PHYSICIAN ASSISTANT

## 2024-03-08 RX ORDER — SODIUM CHLORIDE 9 MG/ML
INJECTION, SOLUTION INTRAVENOUS CONTINUOUS
Status: ACTIVE | OUTPATIENT
Start: 2024-03-08 | End: 2024-03-08

## 2024-03-08 RX ADMIN — ACETAMINOPHEN 650 MG: 325 TABLET ORAL at 09:03

## 2024-03-08 RX ADMIN — SODIUM CHLORIDE, PRESERVATIVE FREE 10 ML: 5 INJECTION INTRAVENOUS at 06:03

## 2024-03-08 RX ADMIN — MICONAZOLE NITRATE 2 % TOPICAL POWDER: at 09:03

## 2024-03-08 RX ADMIN — LOPERAMIDE HYDROCHLORIDE 2 MG: 2 CAPSULE ORAL at 09:03

## 2024-03-08 RX ADMIN — LINEZOLID 600 MG: 600 TABLET, FILM COATED ORAL at 09:03

## 2024-03-08 RX ADMIN — Medication 1 CAPSULE: at 09:03

## 2024-03-08 RX ADMIN — PANTOPRAZOLE SODIUM 40 MG: 40 TABLET, DELAYED RELEASE ORAL at 09:03

## 2024-03-08 RX ADMIN — SODIUM CHLORIDE: 9 INJECTION, SOLUTION INTRAVENOUS at 10:03

## 2024-03-08 RX ADMIN — INSULIN ASPART 15 UNITS: 100 INJECTION, SOLUTION INTRAVENOUS; SUBCUTANEOUS at 09:03

## 2024-03-08 RX ADMIN — Medication 1 CAPSULE: at 06:03

## 2024-03-08 RX ADMIN — FERROUS SULFATE TAB 325 MG (65 MG ELEMENTAL FE) 1 EACH: 325 (65 FE) TAB at 09:03

## 2024-03-08 RX ADMIN — SODIUM CHLORIDE, PRESERVATIVE FREE 10 ML: 5 INJECTION INTRAVENOUS at 12:03

## 2024-03-08 RX ADMIN — INSULIN ASPART 2 UNITS: 100 INJECTION, SOLUTION INTRAVENOUS; SUBCUTANEOUS at 09:03

## 2024-03-08 RX ADMIN — SODIUM CHLORIDE: 9 INJECTION, SOLUTION INTRAVENOUS at 09:03

## 2024-03-08 RX ADMIN — COLLAGENASE SANTYL: 250 OINTMENT TOPICAL at 09:03

## 2024-03-08 RX ADMIN — SITAGLIPTIN 100 MG: 50 TABLET, FILM COATED ORAL at 09:03

## 2024-03-08 RX ADMIN — INSULIN ASPART 15 UNITS: 100 INJECTION, SOLUTION INTRAVENOUS; SUBCUTANEOUS at 05:03

## 2024-03-08 RX ADMIN — HYDROCODONE BITARTRATE AND ACETAMINOPHEN 1 TABLET: 7.5; 325 TABLET ORAL at 06:03

## 2024-03-08 RX ADMIN — Medication 10 ML: at 09:03

## 2024-03-08 RX ADMIN — INSULIN DETEMIR 20 UNITS: 100 INJECTION, SOLUTION SUBCUTANEOUS at 09:03

## 2024-03-08 RX ADMIN — HYDROCODONE BITARTRATE AND ACETAMINOPHEN 1 TABLET: 7.5; 325 TABLET ORAL at 03:03

## 2024-03-08 RX ADMIN — INSULIN ASPART 15 UNITS: 100 INJECTION, SOLUTION INTRAVENOUS; SUBCUTANEOUS at 11:03

## 2024-03-08 RX ADMIN — HYDROCODONE BITARTRATE AND ACETAMINOPHEN 1 TABLET: 7.5; 325 TABLET ORAL at 01:03

## 2024-03-08 RX ADMIN — TOBRAMYCIN 400 MG: 40 INJECTION INTRAMUSCULAR; INTRAVENOUS at 09:03

## 2024-03-08 RX ADMIN — HYDROCODONE BITARTRATE AND ACETAMINOPHEN 1 TABLET: 7.5; 325 TABLET ORAL at 09:03

## 2024-03-08 RX ADMIN — INSULIN ASPART 4 UNITS: 100 INJECTION, SOLUTION INTRAVENOUS; SUBCUTANEOUS at 06:03

## 2024-03-08 RX ADMIN — CETIRIZINE HYDROCHLORIDE 10 MG: 10 TABLET, FILM COATED ORAL at 09:03

## 2024-03-08 RX ADMIN — OXYCODONE HYDROCHLORIDE AND ACETAMINOPHEN 500 MG: 500 TABLET ORAL at 09:03

## 2024-03-08 RX ADMIN — Medication 1 CAPSULE: at 12:03

## 2024-03-08 NOTE — PLAN OF CARE
Problem: Adult Inpatient Plan of Care  Goal: Plan of Care Review  Outcome: Ongoing, Progressing  Flowsheets (Taken 3/7/2024 2309)  Plan of Care Reviewed With: patient  Goal: Patient-Specific Goal (Individualized)  Outcome: Ongoing, Progressing  Flowsheets (Taken 3/7/2024 2309)  Anxieties, Fears or Concerns: None expressed  Individualized Care Needs: safety, monitor CBG, turn q2  Goal: Absence of Hospital-Acquired Illness or Injury  Outcome: Ongoing, Progressing  Intervention: Identify and Manage Fall Risk  Flowsheets (Taken 3/7/2024 2309)  Safety Promotion/Fall Prevention:   assistive device/personal item within reach   bed alarm set   side rails raised x 3   family to remain at bedside  Intervention: Prevent Skin Injury  Flowsheets (Taken 3/7/2024 2309)  Body Position: sitting up in bed  Skin Protection: adhesive use limited  Intervention: Prevent and Manage VTE (Venous Thromboembolism) Risk  Flowsheets (Taken 3/7/2024 2309)  Activity Management: Rolling - L1  VTE Prevention/Management: bleeding precations maintained  Range of Motion: ROM (range of motion) performed  Intervention: Prevent Infection  Flowsheets (Taken 3/7/2024 2309)  Infection Prevention:   cohorting utilized   personal protective equipment utilized   rest/sleep promoted  Goal: Optimal Comfort and Wellbeing  Outcome: Ongoing, Progressing  Intervention: Monitor Pain and Promote Comfort  Flowsheets (Taken 3/7/2024 2309)  Pain Management Interventions:   care clustered   medication offered  Intervention: Provide Person-Centered Care  Flowsheets (Taken 3/7/2024 2309)  Trust Relationship/Rapport:   care explained   choices provided   emotional support provided   empathic listening provided   questions answered   questions encouraged   reassurance provided   thoughts/feelings acknowledged  Goal: Readiness for Transition of Care  Outcome: Ongoing, Progressing  Intervention: Mutually Develop Transition Plan  Flowsheets (Taken 3/7/2024 2309)  Equipment  Currently Used at Home:   hospital bed   power chair   lift device  Transportation Anticipated: family or friend will provide  Who are your caregiver(s) and their phone number(s)?: [phone] Judy(mother)  Communicated SADE with patient/caregiver: Date not available/Unable to determine  Do you expect to return to your current living situation?: Yes  Do you have help at home or someone to help you manage your care at home?: Yes  Readmission within 30 days?: No  Do you currently have service(s) that help you manage your care at home?: No  Is the pt/caregiver preference to resume services with current agency: No     Problem: Diabetes Comorbidity  Goal: Blood Glucose Level Within Targeted Range  Outcome: Ongoing, Progressing  Intervention: Monitor and Manage Glycemia  Flowsheets (Taken 3/7/2024 2309)  Glycemic Management:   blood glucose monitored   supplemental insulin given     Problem: Skin Injury Risk Increased  Goal: Skin Health and Integrity  Outcome: Ongoing, Progressing  Intervention: Optimize Skin Protection  Flowsheets (Taken 3/7/2024 2309)  Pressure Reduction Techniques: frequent weight shift encouraged  Pressure Reduction Devices:   foam padding utilized   specialty bed utilized  Skin Protection: adhesive use limited  Head of Bed (HOB) Positioning: HOB at 20-30 degrees  Intervention: Promote and Optimize Oral Intake  Flowsheets (Taken 3/7/2024 2309)  Oral Nutrition Promotion:   calorie-dense foods provided   calorie-dense liquids provided     Problem: Impaired Wound Healing  Goal: Optimal Wound Healing  Outcome: Ongoing, Progressing  Intervention: Promote Wound Healing  Flowsheets (Taken 3/7/2024 2309)  Oral Nutrition Promotion:   calorie-dense foods provided   calorie-dense liquids provided  Sleep/Rest Enhancement:   awakenings minimized   natural light exposure provided   relaxation techniques promoted  Activity Management: Rolling - L1  Pain Management Interventions:   care clustered   medication  offered     Problem: Fall Injury Risk  Goal: Absence of Fall and Fall-Related Injury  Outcome: Ongoing, Progressing  Intervention: Identify and Manage Contributors  Flowsheets (Taken 3/7/2024 2309)  Self-Care Promotion:   BADL personal objects within reach   meal set-up provided   safe use of adaptive equipment encouraged  Medication Review/Management: medications reviewed  Intervention: Promote Injury-Free Environment  Flowsheets (Taken 3/7/2024 2309)  Safety Promotion/Fall Prevention:   assistive device/personal item within reach   bed alarm set   side rails raised x 3   family to remain at bedside     Problem: Infection  Goal: Absence of Infection Signs and Symptoms  Outcome: Ongoing, Progressing  Intervention: Prevent or Manage Infection  Flowsheets (Taken 3/7/2024 2309)  Fever Reduction/Comfort Measures:   lightweight bedding   lightweight clothing  Infection Management: aseptic technique maintained  Isolation Precautions: precautions maintained

## 2024-03-08 NOTE — PT/OT/SLP PROGRESS
Physical Therapy Treatment Note           Name: Antonio Galvan II    : 1965 (58 y.o.)  MRN: [id number]           TREATMENT SUMMARY AND RECOMMENDATIONS:    PT Received On: 24  PT Start Time: 1430     PT Stop Time: 1505  PT Total Time (min): 35 min     Subjective Assessment:   No complaints  Lethargic   x Awake, alert, cooperative  Uncooperative    Agitated  c/o pain    Appropriate  c/o fatigue    Confused x Treated at bedside     Emotionally labile  Treated in gym/dept.    Impulsive  Other:    Flat affect       Therapy Precautions:    Cognitive deficits  Spinal precautions    Collar - hard  Sternal precautions    Collar - soft   TLSO    Fall risk  LSO    Hip precautions - posterior  Knee immobilizer    Hip precautions - anterior  WBAT    Impaired communication  Partial weightbearing    Oxygen  TTWB    PEG tube  NWB    Visual deficits x Other: Wound Vac, super pubic cath.     Hearing deficits          Treatment Objectives:     Mobility Training:   Assist level Comments    Bed mobility Total A Rolling side to side and scooting trunk for sheet placements.  Proper positioning provided at completion of tx tasks to ensure joint alignment and reduce skin break down   Transfer Total A Supine<>SIt   Gait     Sit to stand transitions     Sitting balance MAX A Sitting EOB x 15 min with monitoring of symptoms   Standing balance      Wheelchair mobility     Car transfer     Other:          Therapeutic Exercise:   Exercise Sets Reps Comments                               Additional Comments:  No issues noted at wound sites. Better tolerance with sitting EOB, no autonomic responses demonstrated. Pt did have a HA at tx end, asking for pain medication. Nursing disconnected IV for tx, Nursing informed of tx end.     Assessment: Patient tolerated session well, able to complete sitting EOB with improvement in tolerance demonstrated.      PT Plan: continue per POC  Revisions made to plan of care: No    GOALS:    Multidisciplinary Problems       Physical Therapy Goals          Problem: Physical Therapy    Goal Priority Disciplines Outcome Goal Variances Interventions   Physical Therapy Goal     PT, PT/OT Ongoing, Progressing     Description: Goals to be met by: Discharge     Pt to be seen for ROM tasks QD to ensure proper positioning can be obtained to overall reduce impacts of prolonged bed rest and skin break down  Progress pending healing stages of current wounds    Patient will increase functional independence with mobility by performin. Pt to tolerate PROM tasks to B UE and LE, while obtaining 25% improvement in range.   2. Sitting EOB to tolerance pending wound healing - with pt demonstrating normal BP  3. Progress to OOB into chair once able.                          Skilled PT Minutes Provided: 35   Communication with Treatment Team:     Equipment recommendations:       At end of treatment, patient remained:   Up in chair     Up in wheelchair in room   x In bed    With alarm activated    Bed rails up   x Call bell in reach    x Family/friends present    Restraints secured properly    In bathroom with CNA/RN notified   x Nurse aware    In gym with therapist/tech    Other:

## 2024-03-08 NOTE — PROGRESS NOTES
Wound vac dressing change performed to sacral area, initiated wound vac dressing to R lateral buttock ulceration as well today per md order.    Improvement noted with increased granulation tissue to sacral  ulceration.   Image taken unable to be retrieved for note.  Will attempt to imbed image with next dressing change.  No lexi bleeding noted to R lateral buttock today, flattened area remains 100% granular.  In area of undermining on Superiolateral aspect of wound, connective tissue - both viable and non viable remain visible, small amount of bone able to be palpated.   Adaptic applied around foam in undermining area prior to insertion.  Black granufoam utilized on both sacral and R buttock ulcerations.   Periwound draped/framed with hydrocolloid dressings, mastisol utilized for additional adhesive, sites bridged to right lateral hip to prevent additional pressure from tubing.  Twice weekly vac dressing changes will continue at this time.  Recommend monitoring pre-albumin weekly due to increased surface area under NPWT.      03/07/24 1815   WOCN Assessment   WOCN Total Time (mins) 100   Visit Date 03/07/24   WOCN Speciality Wound   Wound pressure   Procedure wound vac        Altered Skin Integrity 01/12/23 1530 Sacral spine Full thickness tissue loss with exposed bone, tendon, or muscle. Often includes undermining and tunneling. May extend into muscle and/or supporting structures.   Date First Assessed/Time First Assessed: 01/12/23 1530   Altered Skin Integrity Present on Admission - Did Patient arrive to the hospital with altered skin?: yes  Location: Sacral spine  Description of Altered Skin Integrity: Full thickness tissue los...   Dressing Appearance Intact;Moist drainage   Drainage Amount Small   Drainage Characteristics/Odor Serosanguineous;No odor;Bleeding controlled   Appearance Red;Granulating;Tan;White;Yellow;Slough;Fibrin;Other (see comments)  (connective tissue)   Tissue loss description Full thickness    Red (%), Wound Tissue Color 35 %   Yellow (%), Wound Tissue Color 65 %   Periwound Area Moist;Denuded   Wound Edges Defined   Wound Length (cm) 10 cm   Wound Width (cm) 7.5 cm   Wound Depth (cm) 2.8 cm   Wound Volume (cm^3) 210 cm^3   Wound Surface Area (cm^2) 75 cm^2   Care Cleansed with:;Antimicrobial agent   Dressing Other (comment);Changed  (wound vac, drape)   Periwound Care Hydrocolloid barrier applied;Skin barrier film applied;Other (see comments)  (mastisol adhesive)   Dressing Change Due 03/11/24        Incision/Site 02/27/24 1450 Right Buttocks lateral   Date First Assessed/Time First Assessed: 02/27/24 1450   Side: Right  Location: Buttocks  Orientation: lateral  Additional Comments: mesalt, quick clot, gauze, abdomen pad, and tape   Wound Image     Dressing Appearance Intact;Moist drainage   Drainage Amount Moderate   Drainage Characteristics/Odor Serosanguineous;No odor;Bleeding controlled   Appearance Pink;Granulating;Gray;Tan;Yellow;Muscle;Other (see comments);Bone;Slough  (connective tissue)   Red (%), Wound Tissue Color 90 %   Yellow (%), Wound Tissue Color 10 %   Periwound Area Scar tissue;Moist   Wound Edges Defined   Wound Length (cm) 7.7 cm   Wound Width (cm) 12.6 cm   Wound Depth (cm) 1.8 cm   Wound Volume (cm^3) 174.636 cm^3   Wound Surface Area (cm^2) 97.02 cm^2   Undermining (depth (cm)/location) 5.7cm at 1 o'clock / undermining from 12-2 o'clock   Care Cleansed with:;Antimicrobial agent;Wound cleanser   Dressing Applied;Other (comment)  (wound vac/ drape (adaptic in undermining area)   Periwound Care Hydrocolloid barrier applied;Skin barrier film applied;Other (see comments)  (mastisol adhesive)   Dressing Change Due 03/11/24

## 2024-03-08 NOTE — PLAN OF CARE
Problem: Adult Inpatient Plan of Care  Goal: Plan of Care Review  Outcome: Ongoing, Progressing  Goal: Patient-Specific Goal (Individualized)  Outcome: Ongoing, Progressing  Flowsheets (Taken 3/8/2024 1457)  Anxieties, Fears or Concerns: none expressed  Individualized Care Needs: monitor blood glucose, turn Q2, IV antibitotics  Goal: Absence of Hospital-Acquired Illness or Injury  Outcome: Ongoing, Progressing  Intervention: Identify and Manage Fall Risk  Flowsheets (Taken 3/8/2024 1457)  Safety Promotion/Fall Prevention:   assistive device/personal item within reach   nonskid shoes/socks when out of bed  Goal: Optimal Comfort and Wellbeing  Outcome: Ongoing, Progressing  Goal: Readiness for Transition of Care  Outcome: Ongoing, Progressing     Problem: Diabetes Comorbidity  Goal: Blood Glucose Level Within Targeted Range  Outcome: Ongoing, Progressing  Intervention: Monitor and Manage Glycemia  Flowsheets (Taken 3/8/2024 1457)  Glycemic Management: blood glucose monitored     Problem: Skin Injury Risk Increased  Goal: Skin Health and Integrity  Outcome: Ongoing, Progressing  Intervention: Optimize Skin Protection  Flowsheets (Taken 3/8/2024 1457)  Skin Protection:   adhesive use limited   incontinence pads utilized   tubing/devices free from skin contact  Head of Bed (HOB) Positioning: HOB at 20-30 degrees     Problem: Impaired Wound Healing  Goal: Optimal Wound Healing  Outcome: Ongoing, Progressing     Problem: Fall Injury Risk  Goal: Absence of Fall and Fall-Related Injury  Outcome: Ongoing, Progressing  Intervention: Identify and Manage Contributors  Flowsheets (Taken 3/8/2024 1457)  Self-Care Promotion: independence encouraged  Medication Review/Management: medications reviewed     Problem: Infection  Goal: Absence of Infection Signs and Symptoms  Outcome: Ongoing, Progressing  Intervention: Prevent or Manage Infection  Flowsheets (Taken 3/8/2024 1457)  Infection Management: aseptic technique maintained

## 2024-03-08 NOTE — PROGRESS NOTES
[facility]  [facility] MEDICINE ~ PROGRESS NOTE  CHIEF COMPLAINT   Hospital follow up for nonhealing wound    HOSPITAL COURSE     58-year-old man with quadriplegic spinal paralysis and numerous decubitus ulcers who is followed by wound care is brought in today due to fever. He had wound cultures done 3 days ago that have grown Proteus mirabilis and Klebsiella pneumoniae. Sensitivities are not complete. The wounds were noted to have significant odor and heavy green drainage. In addition the patient had some nausea and vomiting and home health noted that his blood pressure was dipping. He was advised to come to the emergency room. Wound cultures were drawn from the right hip wound. His wife reports that when the wound is pressed above pus comes out. Both Klebsiella and Proteus maybe treated with fluoroquinolones which are both IV and oral. The patient's wife preferred if the patient came home so that she could attend to his wounds however the patient expressed a desire to be admitted.     Today:  No reported complaints today.  Glucose overall improved from days prior, but remains elevated.  Potassium elevated on morning labs, but patient have a bowel movement on exam.  We will repeat potassium this afternoon to determine if he needs Kayexalate.  Starting IV fluids due to BUN.    OBJECTIVE/PHYSICAL EXAM     VITAL SIGNS (MOST RECENT):  Temp: 98.9 °F (37.2 °C) (03/08/24 0724)  Pulse: 80 (03/08/24 0845)  Resp: 18 (03/08/24 0845)  BP: (!) 171/103 (03/08/24 0724)  SpO2: 97 % (03/08/24 0845) VITAL SIGNS (24 HOUR RANGE):  Temp:  [98.3 °F (36.8 °C)-98.9 °F (37.2 °C)] 98.9 °F (37.2 °C)  Pulse:  [] 80  Resp:  [18-20] 18  SpO2:  [96 %-99 %] 97 %  BP: (109-171)/() 171/103     GENERAL: In no acute distress, afebrile  HEENT:  PERRLA  CHEST: Clear to auscultation bilaterally  HEART: S1, S2, no appreciable murmur  ABDOMEN: Soft, nontender, BS +  MSK: Warm, no lower extremity edema, no clubbing  or cyanosis  NEUROLOGIC: Alert and oriented x4  INTEGUMENTARY:  See media for wounds    ASSESSMENT/PLAN     Nonhealing chronic wound   VRE, Proteus, Pseudomonas, Klebsiella  S/p debridement with Dr. Jean on 02/27/2024  Zyvox and Tobramycin - will need 6 weeks of treatment given exposed bone starting 03/02/2024-04/12/2024  Wound VAC and wound care     Post op anemia   H&H improved s/p 2u pRBC 02/28/2024 and 1u pRBC on 02/29/2024  No anticoagulation at this time - aspirin d/c 02/28/2024     Hyperglycemia in setting of DM  A1c 02/23/2024 8.5%  Reports taking 80u Levemir BID  Continue home Sitagliptin  Continue to titrate long and short-acting insulin     Hypotension - resolved     GISSEL  Hyperkalemia  NS 75cc/hr x 1L   Repeat potassium and treat accordingly      Hx of: Neurogenic bladder, Quadriplegia     DVT prophylaxis:  SCDs, history of bleeding in the recent past    Anticipated discharge and disposition:  Can potentially consider home antibiotics however many limiting factors including wound VAC and regular wound care  __________________________________________________________________________    LABS/MICRO/MEDS/DIAGNOSTICS     LABS  Recent Labs     03/08/24  0528      K 5.2*   CHLORIDE 105   CO2 20*   BUN 34.9*   CREATININE 1.14   GLUCOSE 204*   CALCIUM 8.6     Recent Labs     03/08/24  0528   WBC 9.99   RBC 4.12*   HCT 35.1*   MCV 85.2        MICROBIOLOGY  Microbiology Results (last 7 days)       ** No results found for the last 168 hours. **             MEDICATIONS   acetaminophen  650 mg Oral QHS    ascorbic acid (vitamin C)  500 mg Oral Daily    cetirizine  10 mg Oral Daily    collagenase   Topical (Top) Daily    ferrous sulfate  1 tablet Oral Daily    insulin aspart U-100  15 Units Subcutaneous TIDWM    insulin detemir U-100  20 Units Subcutaneous BID    Lactobacillus acidophilus  1 capsule Oral TID WM    linezolid  600 mg Oral Q12H    miconazole NITRATE 2 %   Topical (Top) BID    pantoprazole   40 mg Oral Daily    SITagliptin phosphate  100 mg Oral Daily    sodium chloride 0.9%  10 mL Intravenous Q6H    tobramycin IV (PEDS and ADULTS)  400 mg Intravenous Q36H         INFUSIONS   sodium chloride 0.9% 75 mL/hr at 03/08/24 0949        DIAGNOSTIC TESTS  X-Ray Chest 1 View   Final Result      Left PICC tip overlies the mid SVC.         Electronically signed by: Oj Solomon   Date:    02/28/2024   Time:    14:29           EF   Date Value Ref Range Status   05/09/2023 60 % Final   07/18/2022 40 % Final        NUTRITION STATUS  Patient meets ASPEN criteria for   malnutrition of   per RD assessment as evidenced by:                       A minimum of two characteristics is recommended for diagnosis of either severe or non-severe malnutrition.     Case related differential diagnoses have been reviewed; assessment and plan has been documented. I have personally reviewed the labs and test results that are currently available; I have reviewed the patients medication list. I have reviewed the consulting providers recommendations. I have reviewed or attempted to review medical records based upon their availability.  All of the patient's and/or family's questions have been addressed and answered to the best of my ability.  I will continue to monitor closely and make adjustments to medical management as needed.  This document was created using M*Modal Fluency Direct.  Transcription errors may have been made.  Please contact me if any questions may rise regarding documentation to clarify transcription.      ANTHONY Vazquez   Internal Medicine  Department of Hospital Medicine [facility] - [facility]

## 2024-03-08 NOTE — PLAN OF CARE
[facility]  Discharge Reassessment    Primary Care Provider: Jennifer Fernando NP    Expected Discharge Date:     Reassessment (most recent)       Discharge Reassessment - 03/08/24 0859          Discharge Reassessment    Assessment Type Discharge Planning Reassessment     Did the patient's condition or plan change since previous assessment? No     Discharge Plan discussed with: Patient     Communicated SADE with patient/caregiver Date not available/Unable to determine     Discharge Plan A Home Health;Home with family     DME Needed Upon Discharge  none     Transition of Care Barriers None     Why the patient remains in the hospital Requires continued medical care        Post-Acute Status    Post-Acute Authorization Home Health     Home Health Status Pending medical clearance/testing     Hospital Resources/Appts/Education Provided Provided patient/caregiver with written discharge plan information     Discharge Delays None known at this time

## 2024-03-09 LAB
GLUCOSE SERPL-MCNC: 147 MG/DL (ref 70–110)
GLUCOSE SERPL-MCNC: 172 MG/DL (ref 70–110)
TOBRAMYCIN PEAK SERPL-MCNC: 6.9 UG/ML (ref 4–8)

## 2024-03-09 PROCEDURE — 94760 N-INVAS EAR/PLS OXIMETRY 1: CPT

## 2024-03-09 PROCEDURE — 63600175 PHARM REV CODE 636 W HCPCS: Performed by: PHYSICIAN ASSISTANT

## 2024-03-09 PROCEDURE — A4216 STERILE WATER/SALINE, 10 ML: HCPCS | Performed by: INTERNAL MEDICINE

## 2024-03-09 PROCEDURE — 25000003 PHARM REV CODE 250: Performed by: INTERNAL MEDICINE

## 2024-03-09 PROCEDURE — 63600175 PHARM REV CODE 636 W HCPCS: Performed by: STUDENT IN AN ORGANIZED HEALTH CARE EDUCATION/TRAINING PROGRAM

## 2024-03-09 PROCEDURE — 99900035 HC TECH TIME PER 15 MIN (STAT)

## 2024-03-09 PROCEDURE — 97110 THERAPEUTIC EXERCISES: CPT

## 2024-03-09 PROCEDURE — 27000207 HC ISOLATION

## 2024-03-09 PROCEDURE — 80200 ASSAY OF TOBRAMYCIN: CPT | Performed by: STUDENT IN AN ORGANIZED HEALTH CARE EDUCATION/TRAINING PROGRAM

## 2024-03-09 PROCEDURE — 25000003 PHARM REV CODE 250: Performed by: PHYSICIAN ASSISTANT

## 2024-03-09 PROCEDURE — 11000004 HC SNF PRIVATE

## 2024-03-09 PROCEDURE — 25000003 PHARM REV CODE 250: Performed by: STUDENT IN AN ORGANIZED HEALTH CARE EDUCATION/TRAINING PROGRAM

## 2024-03-09 RX ADMIN — LOPERAMIDE HYDROCHLORIDE 2 MG: 2 CAPSULE ORAL at 09:03

## 2024-03-09 RX ADMIN — INSULIN DETEMIR 20 UNITS: 100 INJECTION, SOLUTION SUBCUTANEOUS at 08:03

## 2024-03-09 RX ADMIN — OXYCODONE HYDROCHLORIDE AND ACETAMINOPHEN 500 MG: 500 TABLET ORAL at 09:03

## 2024-03-09 RX ADMIN — INSULIN ASPART 15 UNITS: 100 INJECTION, SOLUTION INTRAVENOUS; SUBCUTANEOUS at 01:03

## 2024-03-09 RX ADMIN — Medication 1 CAPSULE: at 04:03

## 2024-03-09 RX ADMIN — HYDROCODONE BITARTRATE AND ACETAMINOPHEN 1 TABLET: 7.5; 325 TABLET ORAL at 03:03

## 2024-03-09 RX ADMIN — CETIRIZINE HYDROCHLORIDE 10 MG: 10 TABLET, FILM COATED ORAL at 09:03

## 2024-03-09 RX ADMIN — LINEZOLID 600 MG: 600 TABLET, FILM COATED ORAL at 08:03

## 2024-03-09 RX ADMIN — Medication 1 CAPSULE: at 09:03

## 2024-03-09 RX ADMIN — INSULIN ASPART 15 UNITS: 100 INJECTION, SOLUTION INTRAVENOUS; SUBCUTANEOUS at 05:03

## 2024-03-09 RX ADMIN — INSULIN DETEMIR 20 UNITS: 100 INJECTION, SOLUTION SUBCUTANEOUS at 09:03

## 2024-03-09 RX ADMIN — PANTOPRAZOLE SODIUM 40 MG: 40 TABLET, DELAYED RELEASE ORAL at 09:03

## 2024-03-09 RX ADMIN — ACETAMINOPHEN 650 MG: 325 TABLET ORAL at 08:03

## 2024-03-09 RX ADMIN — SODIUM CHLORIDE, PRESERVATIVE FREE 10 ML: 5 INJECTION INTRAVENOUS at 01:03

## 2024-03-09 RX ADMIN — COLLAGENASE SANTYL: 250 OINTMENT TOPICAL at 09:03

## 2024-03-09 RX ADMIN — INSULIN ASPART 4 UNITS: 100 INJECTION, SOLUTION INTRAVENOUS; SUBCUTANEOUS at 05:03

## 2024-03-09 RX ADMIN — SODIUM CHLORIDE, PRESERVATIVE FREE 10 ML: 5 INJECTION INTRAVENOUS at 06:03

## 2024-03-09 RX ADMIN — SODIUM CHLORIDE, PRESERVATIVE FREE 10 ML: 5 INJECTION INTRAVENOUS at 05:03

## 2024-03-09 RX ADMIN — LINEZOLID 600 MG: 600 TABLET, FILM COATED ORAL at 09:03

## 2024-03-09 RX ADMIN — FERROUS SULFATE TAB 325 MG (65 MG ELEMENTAL FE) 1 EACH: 325 (65 FE) TAB at 09:03

## 2024-03-09 RX ADMIN — Medication 1 CAPSULE: at 01:03

## 2024-03-09 RX ADMIN — SITAGLIPTIN 100 MG: 50 TABLET, FILM COATED ORAL at 09:03

## 2024-03-09 RX ADMIN — INSULIN ASPART 15 UNITS: 100 INJECTION, SOLUTION INTRAVENOUS; SUBCUTANEOUS at 09:03

## 2024-03-09 RX ADMIN — HYDROCODONE BITARTRATE AND ACETAMINOPHEN 1 TABLET: 5; 325 TABLET ORAL at 09:03

## 2024-03-09 RX ADMIN — INSULIN ASPART 1 UNITS: 100 INJECTION, SOLUTION INTRAVENOUS; SUBCUTANEOUS at 09:03

## 2024-03-09 NOTE — PROGRESS NOTES
"Pharmacokinetic Follow Up: Tobramycin    Assessment of levels:   Random concentration of 6.9 mcg/mL (7 hours post-infusion) corresponds to a dosing interval of every 36 hours per the [place] [facility]    Regimen Plan:   Continue current dose: Tobramycin 400 mg IV every 36 hours    Drug levels (last 3 results):  No results for input(s): "AMIKACINPEAK", "AMIKACINTROU", "AMIKACINRAND", "AMIKACIN" in the last 72 hours.    No results for input(s): "GENTAMICIN" in the last 72 hours.    Invalid input(s): "GENTPEAK", "GENTTROUGH", "GENT10", "GENT12", "GENT8", "GENTRANDOM"    Recent Labs   Lab Result Units 03/09/24  0512   Tobramycin Peak ug/ml 6.9       Pharmacy will continue to monitor.    Please contact pharmacy at extension [phone] with any questions regarding this assessment.    Thank you for the consult,   Susan Veloz      Patient brief summary:  Antonio Galvan II is a 58 y.o. male initiated on aminoglycoside therapy for treatment of skin & soft tissue infection    Drug Allergies:   Review of patient's allergies indicates:  No Known Allergies    Actual Body Weight:   95kg    Adjust Body Weight:   77.7kg    Ideal Body Weight:  66.1kg    Renal Function:   Estimated Creatinine Clearance: 79.5 mL/min (based on SCr of 1.14 mg/dL).,     Dialysis Method (if applicable):  N/A    CBC (last 72 hours):  Recent Labs   Lab Result Units 03/08/24  0528   WBC x10(3)/mcL 9.99   Hgb g/dL 11.2*   Hct % 35.1*   Platelet x10(3)/mcL 280   Mono % % 4.8   Eos % % 6.1   Basophil % % 0.5       Metabolic Panel (last 72 hours):  Recent Labs   Lab Result Units 03/08/24  0528 03/08/24  1422   Sodium Level mmol/L 137  --    Potassium Level mmol/L 5.2* 4.6   Chloride mmol/L 105  --    Carbon Dioxide mmol/L 20*  --    Glucose Level mg/dL 204*  --    Blood Urea Nitrogen mg/dL 34.9*  --    Creatinine mg/dL 1.14  --        Aminoglycoside Administrations:  aminoglycosides given in last 96 hours                     tobramycin (NEBCIN) 400 mg in " dextrose 5 % (D5W) 100 mL IVPB (mg) 400 mg New Bag 03/08/24 2142     400 mg New Bag 03/07/24 0917     400 mg New Bag 03/05/24 2244                    Microbiologic Results:  Microbiology Results (last 7 days)       ** No results found for the last 168 hours. **

## 2024-03-09 NOTE — PLAN OF CARE
Problem: Adult Inpatient Plan of Care  Goal: Plan of Care Review  Outcome: Ongoing, Progressing  Flowsheets (Taken 3/9/2024 0800)  Plan of Care Reviewed With:   patient   family  Goal: Patient-Specific Goal (Individualized)  Outcome: Ongoing, Progressing  Flowsheets (Taken 3/9/2024 0800)  Anxieties, Fears or Concerns: none verbalized at this time  Individualized Care Needs: monitor CBGs, pain management, continue IV abx, and monitor wound vac  Goal: Absence of Hospital-Acquired Illness or Injury  Outcome: Ongoing, Progressing  Intervention: Identify and Manage Fall Risk  Flowsheets (Taken 3/9/2024 0800)  Safety Promotion/Fall Prevention:   assistive device/personal item within reach   bed alarm set   Fall Risk reviewed with patient/family   Fall Risk signage in place   family to remain at bedside   high risk medications identified   medications reviewed   nonskid shoes/socks when out of bed   room near unit station   side rails raised x 3   instructed to call staff for mobility  Intervention: Prevent Skin Injury  Flowsheets (Taken 3/9/2024 0800)  Body Position: position maintained  Skin Protection:   adhesive use limited   incontinence pads utilized   tubing/devices free from skin contact  Intervention: Prevent and Manage VTE (Venous Thromboembolism) Risk  Flowsheets (Taken 3/9/2024 0800)  Range of Motion: active ROM (range of motion) encouraged  Intervention: Prevent Infection  Flowsheets (Taken 3/9/2024 0800)  Infection Prevention:   cohorting utilized   environmental surveillance performed   hand hygiene promoted   personal protective equipment utilized   rest/sleep promoted   single patient room provided  Goal: Optimal Comfort and Wellbeing  Outcome: Ongoing, Progressing  Intervention: Monitor Pain and Promote Comfort  Flowsheets (Taken 3/9/2024 0800)  Pain Management Interventions:   quiet environment facilitated   relaxation techniques promoted  Intervention: Provide Person-Centered Care  Flowsheets (Taken  3/9/2024 0800)  Trust Relationship/Rapport:   care explained   empathic listening provided   questions answered   questions encouraged   thoughts/feelings acknowledged  Goal: Readiness for Transition of Care  Outcome: Ongoing, Progressing     Problem: Diabetes Comorbidity  Goal: Blood Glucose Level Within Targeted Range  Outcome: Ongoing, Progressing  Intervention: Monitor and Manage Glycemia  Flowsheets (Taken 3/9/2024 0800)  Glycemic Management: blood glucose monitored     Problem: Skin Injury Risk Increased  Goal: Skin Health and Integrity  Outcome: Ongoing, Progressing  Intervention: Optimize Skin Protection  Flowsheets (Taken 3/9/2024 0800)  Pressure Reduction Techniques: frequent weight shift encouraged  Pressure Reduction Devices:   heel offloading device utilized   positioning supports utilized   pressure-redistributing mattress utilized   specialty bed utilized  Skin Protection:   adhesive use limited   incontinence pads utilized   tubing/devices free from skin contact  Head of Bed (HOB) Positioning: HOB at 20-30 degrees  Intervention: Promote and Optimize Oral Intake  Flowsheets (Taken 3/9/2024 0800)  Oral Nutrition Promotion: calorie-dense foods provided     Problem: Impaired Wound Healing  Goal: Optimal Wound Healing  Outcome: Ongoing, Progressing  Intervention: Promote Wound Healing  Flowsheets (Taken 3/9/2024 0800)  Oral Nutrition Promotion: calorie-dense foods provided  Sleep/Rest Enhancement:   regular sleep/rest pattern promoted   relaxation techniques promoted  Pain Management Interventions:   quiet environment facilitated   relaxation techniques promoted     Problem: Fall Injury Risk  Goal: Absence of Fall and Fall-Related Injury  Outcome: Ongoing, Progressing  Intervention: Identify and Manage Contributors  Flowsheets (Taken 3/9/2024 0800)  Self-Care Promotion:   BADL personal objects within reach   BADL personal routines maintained   safe use of adaptive equipment encouraged   independence  encouraged  Intervention: Promote Injury-Free Environment  Flowsheets (Taken 3/9/2024 0800)  Safety Promotion/Fall Prevention:   assistive device/personal item within reach   bed alarm set   Fall Risk reviewed with patient/family   Fall Risk signage in place   family to remain at bedside   high risk medications identified   medications reviewed   nonskid shoes/socks when out of bed   room near unit station   side rails raised x 3   instructed to call staff for mobility     Problem: Infection  Goal: Absence of Infection Signs and Symptoms  Outcome: Ongoing, Progressing  Intervention: Prevent or Manage Infection  Flowsheets (Taken 3/9/2024 0800)  Fever Reduction/Comfort Measures:   lightweight bedding   lightweight clothing  Infection Management: aseptic technique maintained  Isolation Precautions:   contact   precautions maintained

## 2024-03-09 NOTE — PLAN OF CARE
Problem: Adult Inpatient Plan of Care  Goal: Plan of Care Review  Outcome: Ongoing, Progressing  Goal: Patient-Specific Goal (Individualized)  Outcome: Ongoing, Progressing  Flowsheets (Taken 3/8/2024 2012)  Anxieties, Fears or Concerns: no concerns noted  Individualized Care Needs: Monitor CBGs and pain management, continue IV abx and monitor Wound Vac  Goal: Absence of Hospital-Acquired Illness or Injury  Outcome: Ongoing, Progressing  Goal: Optimal Comfort and Wellbeing  Outcome: Ongoing, Progressing  Goal: Readiness for Transition of Care  Outcome: Ongoing, Progressing     Problem: Diabetes Comorbidity  Goal: Blood Glucose Level Within Targeted Range  Outcome: Ongoing, Progressing  Intervention: Monitor and Manage Glycemia  Flowsheets (Taken 3/9/2024 0012)  Glycemic Management:   blood glucose monitored   oral hydration promoted   supplemental insulin given     Problem: Skin Injury Risk Increased  Goal: Skin Health and Integrity  Outcome: Ongoing, Progressing  Intervention: Optimize Skin Protection  Flowsheets (Taken 3/8/2024 2010)  Pressure Reduction Techniques: frequent weight shift encouraged  Pressure Reduction Devices:   heel offloading device utilized   positioning supports utilized   pressure-redistributing mattress utilized   specialty bed utilized  Skin Protection:   adhesive use limited   incontinence pads utilized   skin sealant/moisture barrier applied   tubing/devices free from skin contact   skin-to-skin areas padded  Head of Bed (HOB) Positioning: HOB elevated  Intervention: Promote and Optimize Oral Intake  Flowsheets (Taken 3/8/2024 2010)  Oral Nutrition Promotion: calorie-dense foods provided     Problem: Impaired Wound Healing  Goal: Optimal Wound Healing  Outcome: Ongoing, Progressing  Intervention: Promote Wound Healing  Flowsheets (Taken 3/8/2024 2010)  Oral Nutrition Promotion: calorie-dense foods provided  Sleep/Rest Enhancement:   awakenings minimized   consistent schedule  promoted  Activity Management: Rolling - L1  Pain Management Interventions:   medication offered   pain management plan reviewed with patient/caregiver   quiet environment facilitated     Problem: Infection  Goal: Absence of Infection Signs and Symptoms  Outcome: Ongoing, Progressing  Intervention: Prevent or Manage Infection  Flowsheets (Taken 3/9/2024 0012)  Fever Reduction/Comfort Measures:   lightweight bedding   lightweight clothing   fluid intake increased  Infection Management: aseptic technique maintained  Isolation Precautions:   contact   precautions maintained

## 2024-03-09 NOTE — PROGRESS NOTES
[facility]  [facility] MEDICINE ~ PROGRESS NOTE  CHIEF COMPLAINT   Hospital follow up for nonhealing wound    HOSPITAL COURSE     58-year-old man with quadriplegic spinal paralysis and numerous decubitus ulcers who is followed by wound care is brought in today due to fever. He had wound cultures done 3 days ago that have grown Proteus mirabilis and Klebsiella pneumoniae. Sensitivities are not complete. The wounds were noted to have significant odor and heavy green drainage. In addition the patient had some nausea and vomiting and home health noted that his blood pressure was dipping. He was advised to come to the emergency room. Wound cultures were drawn from the right hip wound. His wife reports that when the wound is pressed above pus comes out. Both Klebsiella and Proteus maybe treated with fluoroquinolones which are both IV and oral. The patient's wife preferred if the patient came home so that she could attend to his wounds however the patient expressed a desire to be admitted.     Today:  Blood pressures are elevated but likely due to pain with turning.  He also is having some elevated blood sugars and I will need to possibly adjust his long-acting insulins.      His family did complain that they came all night every hour and woke him up.  I did explain that I will have to let nursing address that as this is out of my scope and I have no control over the nursing services here.  [facility] is aware.  Family is also requesting that his bowel regimen be done 3 times a week and that a staff member is present during the entire regimen due to the wound VAC and the wounds and soiling.  Once again this will have to be addressed with the nurses and nursing management.  This is out of my scope as well.    OBJECTIVE/PHYSICAL EXAM     VITAL SIGNS (MOST RECENT):  Temp: 100.1 °F (37.8 °C) (03/09/24 0721)  Pulse: 87 (03/09/24 0721)  Resp: 18 (03/09/24 0334)  BP: 112/72 (03/09/24 0721)  SpO2: 100 %  (03/09/24 0721) VITAL SIGNS (24 HOUR RANGE):  Temp:  [97.7 °F (36.5 °C)-100.1 °F (37.8 °C)] 100.1 °F (37.8 °C)  Pulse:  [] 87  Resp:  [17-18] 18  SpO2:  [96 %-100 %] 100 %  BP: (112-193)/() 112/72     GENERAL: In no acute distress, afebrile  HEENT:  PERRLA  CHEST: Clear to auscultation bilaterally  HEART: S1, S2, no appreciable murmur  ABDOMEN: Soft, nontender, BS +  MSK: Warm, no lower extremity edema, no clubbing or cyanosis  NEUROLOGIC: Alert and oriented x4  INTEGUMENTARY:  See media for wounds    ASSESSMENT/PLAN     Nonhealing chronic wound   VRE, Proteus, Pseudomonas, Klebsiella  S/p debridement with Dr. Jean on 02/27/2024  Zyvox and Tobramycin - will need 6 weeks of treatment given exposed bone starting 03/02/2024-04/12/2024  Wound VAC and wound care     Post op anemia   H&H improved s/p 2u pRBC 02/28/2024 and 1u pRBC on 02/29/2024  No anticoagulation at this time - aspirin d/c 02/28/2024     Hyperglycemia in setting of DM  A1c 02/23/2024 8.5%  Reports taking 80u Levemir BID  Continue home Sitagliptin  Continue to titrate long and short-acting insulin     Hypotension - resolved     GISSEL  Hyperkalemia  NS 75cc/hr x 1L   Repeat potassium and treat accordingly      Hx of: Neurogenic bladder, Quadriplegia     DVT prophylaxis:  SCDs, history of bleeding in the recent past    Anticipated discharge and disposition:  Can potentially consider home antibiotics however many limiting factors including wound VAC and regular wound care  __________________________________________________________________________    LABS/MICRO/MEDS/DIAGNOSTICS     LABS  Recent Labs     03/08/24  0528 03/08/24  1422     --    K 5.2* 4.6   CHLORIDE 105  --    CO2 20*  --    BUN 34.9*  --    CREATININE 1.14  --    GLUCOSE 204*  --    CALCIUM 8.6  --      Recent Labs     03/08/24  0528   WBC 9.99   RBC 4.12*   HCT 35.1*   MCV 85.2        MICROBIOLOGY  Microbiology Results (last 7 days)       ** No results found for  the last 168 hours. **             MEDICATIONS   acetaminophen  650 mg Oral QHS    ascorbic acid (vitamin C)  500 mg Oral Daily    cetirizine  10 mg Oral Daily    collagenase   Topical (Top) Daily    ferrous sulfate  1 tablet Oral Daily    insulin aspart U-100  15 Units Subcutaneous TIDWM    insulin detemir U-100  20 Units Subcutaneous BID    Lactobacillus acidophilus  1 capsule Oral TID WM    linezolid  600 mg Oral Q12H    miconazole NITRATE 2 %   Topical (Top) BID    pantoprazole  40 mg Oral Daily    SITagliptin phosphate  100 mg Oral Daily    sodium chloride 0.9%  10 mL Intravenous Q6H    tobramycin IV (PEDS and ADULTS)  400 mg Intravenous Q36H         INFUSIONS         DIAGNOSTIC TESTS  X-Ray Chest 1 View   Final Result      Left PICC tip overlies the mid SVC.         Electronically signed by: Oj Solomon   Date:    02/28/2024   Time:    14:29           EF   Date Value Ref Range Status   05/09/2023 60 % Final   07/18/2022 40 % Final        NUTRITION STATUS  Patient meets ASPEN criteria for   malnutrition of   per RD assessment as evidenced by:                       A minimum of two characteristics is recommended for diagnosis of either severe or non-severe malnutrition.     Case related differential diagnoses have been reviewed; assessment and plan has been documented. I have personally reviewed the labs and test results that are currently available; I have reviewed the patients medication list. I have reviewed the consulting providers recommendations. I have reviewed or attempted to review medical records based upon their availability.  All of the patient's and/or family's questions have been addressed and answered to the best of my ability.  I will continue to monitor closely and make adjustments to medical management as needed.  This document was created using M*Modal Fluency Direct.  Transcription errors may have been made.  Please contact me if any questions may rise regarding documentation to clarify  transcription.      Jeanette Mann MD   Internal Medicine  Department of Hospital Medicine [facility]

## 2024-03-09 NOTE — PT/OT/SLP PROGRESS
Physical Therapy Treatment Note           Name: Antonio Galvan II    : 1965 (58 y.o.)  MRN: [id number]           TREATMENT SUMMARY AND RECOMMENDATIONS:    PT Received On: 24  PT Start Time: 1150     PT Stop Time: 1210  PT Total Time (min): 20 min     Subjective Assessment:   No complaints  Lethargic   x Awake, alert, cooperative  Uncooperative    Agitated  c/o pain    Appropriate  c/o fatigue    Confused x Treated at bedside     Emotionally labile  Treated in gym/dept.    Impulsive  Other:    Flat affect       Therapy Precautions:    Cognitive deficits  Spinal precautions    Collar - hard  Sternal precautions    Collar - soft   TLSO    Fall risk  LSO    Hip precautions - posterior  Knee immobilizer    Hip precautions - anterior  WBAT    Impaired communication  Partial weightbearing    Oxygen  TTWB    PEG tube  NWB    Visual deficits x Other: wound vac, suprapubic cath    Hearing deficits          Treatment Objectives:     Mobility Training:   Assist level Comments    Bed mobility     Transfer     Gait     Sit to stand transitions     Sitting balance     Standing balance      Wheelchair mobility     Car transfer     Other:          Therapeutic Exercise:   Exercise Sets Reps Comments   BUE PROM 1 30 Shoulders, elbows, wrists, fingers in all planes and motions   BLE PROM 1 30 Hips, knees, ankles in all planes and motions                   Additional Comments:      Assessment: Patient tolerated session well.    PT Plan: cont POC  Revisions made to plan of care: No    GOALS:   Multidisciplinary Problems       Physical Therapy Goals          Problem: Physical Therapy    Goal Priority Disciplines Outcome Goal Variances Interventions   Physical Therapy Goal     PT, PT/OT Ongoing, Progressing     Description: Goals to be met by: Discharge     Pt to be seen for ROM tasks QD to ensure proper positioning can be obtained to overall reduce impacts of prolonged bed rest and skin break down  Progress  pending healing stages of current wounds    Patient will increase functional independence with mobility by performin. Pt to tolerate PROM tasks to B UE and LE, while obtaining 25% improvement in range.   2. Sitting EOB to tolerance pending wound healing - with pt demonstrating normal BP  3. Progress to OOB into chair once able.                          Skilled PT Minutes Provided: 20   Communication with Treatment Team:     Equipment recommendations:       At end of treatment, patient remained:   Up in chair     Up in wheelchair in room   x In bed   x With alarm activated   x Bed rails up   x Call bell in reach    x Family/friends present    Restraints secured properly    In bathroom with CNA/RN notified    Nurse aware    In gym with therapist/tech    Other:

## 2024-03-10 LAB
GLUCOSE SERPL-MCNC: 154 MG/DL (ref 70–110)
GLUCOSE SERPL-MCNC: 163 MG/DL (ref 70–110)
TOBRAMYCIN TROUGH SERPL-MCNC: 1.8 UG/ML (ref 0–2)

## 2024-03-10 PROCEDURE — 11000004 HC SNF PRIVATE

## 2024-03-10 PROCEDURE — 63600175 PHARM REV CODE 636 W HCPCS: Performed by: STUDENT IN AN ORGANIZED HEALTH CARE EDUCATION/TRAINING PROGRAM

## 2024-03-10 PROCEDURE — 99900035 HC TECH TIME PER 15 MIN (STAT)

## 2024-03-10 PROCEDURE — 94760 N-INVAS EAR/PLS OXIMETRY 1: CPT

## 2024-03-10 PROCEDURE — 27000207 HC ISOLATION

## 2024-03-10 PROCEDURE — A4216 STERILE WATER/SALINE, 10 ML: HCPCS | Performed by: INTERNAL MEDICINE

## 2024-03-10 PROCEDURE — 25000003 PHARM REV CODE 250: Performed by: PHYSICIAN ASSISTANT

## 2024-03-10 PROCEDURE — 63600175 PHARM REV CODE 636 W HCPCS: Performed by: PHYSICIAN ASSISTANT

## 2024-03-10 PROCEDURE — 25000003 PHARM REV CODE 250: Performed by: STUDENT IN AN ORGANIZED HEALTH CARE EDUCATION/TRAINING PROGRAM

## 2024-03-10 PROCEDURE — 80200 ASSAY OF TOBRAMYCIN: CPT | Performed by: STUDENT IN AN ORGANIZED HEALTH CARE EDUCATION/TRAINING PROGRAM

## 2024-03-10 PROCEDURE — 25000003 PHARM REV CODE 250: Performed by: INTERNAL MEDICINE

## 2024-03-10 PROCEDURE — 63600175 PHARM REV CODE 636 W HCPCS: Mod: JZ,TB | Performed by: INTERNAL MEDICINE

## 2024-03-10 RX ORDER — SODIUM CHLORIDE 9 MG/ML
INJECTION, SOLUTION INTRAVENOUS CONTINUOUS
Status: DISCONTINUED | OUTPATIENT
Start: 2024-03-10 | End: 2024-03-14

## 2024-03-10 RX ADMIN — FERROUS SULFATE TAB 325 MG (65 MG ELEMENTAL FE) 1 EACH: 325 (65 FE) TAB at 09:03

## 2024-03-10 RX ADMIN — COLLAGENASE SANTYL: 250 OINTMENT TOPICAL at 09:03

## 2024-03-10 RX ADMIN — INSULIN ASPART 15 UNITS: 100 INJECTION, SOLUTION INTRAVENOUS; SUBCUTANEOUS at 12:03

## 2024-03-10 RX ADMIN — CETIRIZINE HYDROCHLORIDE 10 MG: 10 TABLET, FILM COATED ORAL at 09:03

## 2024-03-10 RX ADMIN — HYDROCODONE BITARTRATE AND ACETAMINOPHEN 1 TABLET: 7.5; 325 TABLET ORAL at 05:03

## 2024-03-10 RX ADMIN — LINEZOLID 600 MG: 600 TABLET, FILM COATED ORAL at 09:03

## 2024-03-10 RX ADMIN — PANTOPRAZOLE SODIUM 40 MG: 40 TABLET, DELAYED RELEASE ORAL at 09:03

## 2024-03-10 RX ADMIN — Medication 1 CAPSULE: at 12:03

## 2024-03-10 RX ADMIN — ALTEPLASE 2 MG: 2.2 INJECTION, POWDER, LYOPHILIZED, FOR SOLUTION INTRAVENOUS at 01:03

## 2024-03-10 RX ADMIN — HYDROCODONE BITARTRATE AND ACETAMINOPHEN 1 TABLET: 5; 325 TABLET ORAL at 11:03

## 2024-03-10 RX ADMIN — SODIUM CHLORIDE, PRESERVATIVE FREE 10 ML: 5 INJECTION INTRAVENOUS at 12:03

## 2024-03-10 RX ADMIN — TOBRAMYCIN 400 MG: 40 INJECTION INTRAMUSCULAR; INTRAVENOUS at 03:03

## 2024-03-10 RX ADMIN — Medication 1 CAPSULE: at 09:03

## 2024-03-10 RX ADMIN — INSULIN DETEMIR 20 UNITS: 100 INJECTION, SOLUTION SUBCUTANEOUS at 09:03

## 2024-03-10 RX ADMIN — SODIUM CHLORIDE, PRESERVATIVE FREE 10 ML: 5 INJECTION INTRAVENOUS at 05:03

## 2024-03-10 RX ADMIN — INSULIN ASPART 15 UNITS: 100 INJECTION, SOLUTION INTRAVENOUS; SUBCUTANEOUS at 05:03

## 2024-03-10 RX ADMIN — INSULIN ASPART 15 UNITS: 100 INJECTION, SOLUTION INTRAVENOUS; SUBCUTANEOUS at 09:03

## 2024-03-10 RX ADMIN — OXYCODONE HYDROCHLORIDE AND ACETAMINOPHEN 500 MG: 500 TABLET ORAL at 09:03

## 2024-03-10 RX ADMIN — Medication 1 CAPSULE: at 05:03

## 2024-03-10 RX ADMIN — SITAGLIPTIN 100 MG: 50 TABLET, FILM COATED ORAL at 09:03

## 2024-03-10 RX ADMIN — ACETAMINOPHEN 650 MG: 325 TABLET ORAL at 09:03

## 2024-03-10 RX ADMIN — SODIUM CHLORIDE: 9 INJECTION, SOLUTION INTRAVENOUS at 09:03

## 2024-03-10 RX ADMIN — SODIUM CHLORIDE, PRESERVATIVE FREE 10 ML: 5 INJECTION INTRAVENOUS at 11:03

## 2024-03-10 RX ADMIN — HYDROCODONE BITARTRATE AND ACETAMINOPHEN 1 TABLET: 7.5; 325 TABLET ORAL at 12:03

## 2024-03-10 RX ADMIN — INSULIN ASPART 2 UNITS: 100 INJECTION, SOLUTION INTRAVENOUS; SUBCUTANEOUS at 12:03

## 2024-03-10 RX ADMIN — INSULIN ASPART 1 UNITS: 100 INJECTION, SOLUTION INTRAVENOUS; SUBCUTANEOUS at 05:03

## 2024-03-10 NOTE — PLAN OF CARE
Problem: Adult Inpatient Plan of Care  Goal: Plan of Care Review  Outcome: Ongoing, Progressing  Goal: Patient-Specific Goal (Individualized)  Outcome: Ongoing, Progressing  Flowsheets (Taken 3/9/2024 2245)  Anxieties, Fears or Concerns: concerned about sleeping tonight with minimum disturbances.  Individualized Care Needs: Continue to monitor CBGs and cover with SSI as needed, monitor wound vac for to ensure it is working properly, monitor pt for improved sleeping tonight.  Goal: Absence of Hospital-Acquired Illness or Injury  Outcome: Ongoing, Progressing  Goal: Optimal Comfort and Wellbeing  Outcome: Ongoing, Progressing  Goal: Readiness for Transition of Care  Outcome: Ongoing, Progressing     Problem: Diabetes Comorbidity  Goal: Blood Glucose Level Within Targeted Range  Outcome: Ongoing, Progressing  Intervention: Monitor and Manage Glycemia  Flowsheets (Taken 3/9/2024 2245)  Glycemic Management:   blood glucose monitored   oral hydration promoted   supplemental insulin given     Problem: Skin Injury Risk Increased  Goal: Skin Health and Integrity  Outcome: Ongoing, Progressing  Intervention: Optimize Skin Protection  Flowsheets (Taken 3/9/2024 2245)  Pressure Reduction Techniques:   frequent weight shift encouraged   weight shift assistance provided  Pressure Reduction Devices:   heel offloading device utilized   positioning supports utilized   pressure-redistributing mattress utilized  Skin Protection:   adhesive use limited   skin sealant/moisture barrier applied   tubing/devices free from skin contact   incontinence pads utilized  Head of Bed (HOB) Positioning: HOB elevated     Problem: Fall Injury Risk  Goal: Absence of Fall and Fall-Related Injury  Outcome: Ongoing, Progressing  Intervention: Identify and Manage Contributors  Flowsheets (Taken 3/9/2024 2245)  Self-Care Promotion:   independence encouraged   BADL personal objects within reach   BADL personal routines maintained  Medication  Review/Management:   medications reviewed   high-risk medications identified     Problem: Infection  Goal: Absence of Infection Signs and Symptoms  Outcome: Ongoing, Progressing  Intervention: Prevent or Manage Infection  Flowsheets (Taken 3/9/2024 7775)  Fever Reduction/Comfort Measures:   lightweight bedding   lightweight clothing  Infection Management: aseptic technique maintained  Isolation Precautions:   contact   precautions maintained

## 2024-03-10 NOTE — PLAN OF CARE
Problem: Adult Inpatient Plan of Care  Goal: Plan of Care Review  Outcome: Ongoing, Progressing  Flowsheets (Taken 3/10/2024 0800)  Plan of Care Reviewed With:   patient   family  Goal: Patient-Specific Goal (Individualized)  Outcome: Ongoing, Progressing  Flowsheets (Taken 3/10/2024 0800)  Anxieties, Fears or Concerns: none verbalized at this time  Individualized Care Needs: monitor CBGs, monitor wound vac to ensure it is working properly, wound care, pain management  Goal: Absence of Hospital-Acquired Illness or Injury  Outcome: Ongoing, Progressing  Intervention: Identify and Manage Fall Risk  Flowsheets (Taken 3/10/2024 0800)  Safety Promotion/Fall Prevention:   assistive device/personal item within reach   bed alarm set   Fall Risk reviewed with patient/family   Fall Risk signage in place   family to remain at bedside   high risk medications identified   medications reviewed   nonskid shoes/socks when out of bed   room near unit station   side rails raised x 3   instructed to call staff for mobility  Intervention: Prevent Skin Injury  Flowsheets (Taken 3/10/2024 0800)  Body Position: position maintained  Skin Protection:   adhesive use limited   incontinence pads utilized   tubing/devices free from skin contact  Intervention: Prevent and Manage VTE (Venous Thromboembolism) Risk  Flowsheets (Taken 3/10/2024 0800)  Range of Motion: active ROM (range of motion) encouraged  Intervention: Prevent Infection  Flowsheets (Taken 3/10/2024 0800)  Infection Prevention:   cohorting utilized   environmental surveillance performed   hand hygiene promoted   personal protective equipment utilized   rest/sleep promoted   single patient room provided   visitors restricted/screened   equipment surfaces disinfected  Goal: Optimal Comfort and Wellbeing  Outcome: Ongoing, Progressing  Intervention: Monitor Pain and Promote Comfort  Flowsheets (Taken 3/10/2024 0800)  Pain Management Interventions:   quiet environment facilitated    relaxation techniques promoted  Intervention: Provide Person-Centered Care  Flowsheets (Taken 3/10/2024 0800)  Trust Relationship/Rapport:   care explained   empathic listening provided   questions answered   questions encouraged   thoughts/feelings acknowledged  Goal: Readiness for Transition of Care  Outcome: Ongoing, Progressing     Problem: Diabetes Comorbidity  Goal: Blood Glucose Level Within Targeted Range  Outcome: Ongoing, Progressing  Intervention: Monitor and Manage Glycemia  Flowsheets (Taken 3/10/2024 0800)  Glycemic Management: blood glucose monitored     Problem: Skin Injury Risk Increased  Goal: Skin Health and Integrity  Outcome: Ongoing, Progressing  Intervention: Optimize Skin Protection  Flowsheets (Taken 3/10/2024 0800)  Pressure Reduction Techniques:   frequent weight shift encouraged   weight shift assistance provided  Pressure Reduction Devices:   heel offloading device utilized   positioning supports utilized   pressure-redistributing mattress utilized  Skin Protection:   adhesive use limited   incontinence pads utilized   tubing/devices free from skin contact  Head of Bed (HOB) Positioning: HOB at 20-30 degrees  Intervention: Promote and Optimize Oral Intake  Flowsheets (Taken 3/10/2024 0800)  Oral Nutrition Promotion:   calorie-dense foods provided   calorie-dense liquids provided     Problem: Impaired Wound Healing  Goal: Optimal Wound Healing  Outcome: Ongoing, Progressing  Intervention: Promote Wound Healing  Flowsheets (Taken 3/10/2024 0800)  Oral Nutrition Promotion:   calorie-dense foods provided   calorie-dense liquids provided  Sleep/Rest Enhancement:   regular sleep/rest pattern promoted   relaxation techniques promoted  Pain Management Interventions:   quiet environment facilitated   relaxation techniques promoted     Problem: Fall Injury Risk  Goal: Absence of Fall and Fall-Related Injury  Outcome: Ongoing, Progressing  Intervention: Identify and Manage Contributors  Flowsheets  (Taken 3/10/2024 0800)  Self-Care Promotion:   independence encouraged   BADL personal objects within reach   safe use of adaptive equipment encouraged  Intervention: Promote Injury-Free Environment  Flowsheets (Taken 3/10/2024 0800)  Safety Promotion/Fall Prevention:   assistive device/personal item within reach   bed alarm set   Fall Risk reviewed with patient/family   Fall Risk signage in place   family to remain at bedside   high risk medications identified   medications reviewed   nonskid shoes/socks when out of bed   room near unit station   side rails raised x 3   instructed to call staff for mobility     Problem: Infection  Goal: Absence of Infection Signs and Symptoms  Outcome: Ongoing, Progressing  Intervention: Prevent or Manage Infection  Flowsheets (Taken 3/10/2024 0800)  Fever Reduction/Comfort Measures:   lightweight bedding   lightweight clothing  Infection Management: aseptic technique maintained  Isolation Precautions:   contact   precautions maintained

## 2024-03-10 NOTE — PROGRESS NOTES
[facility]  [facility] MEDICINE ~ PROGRESS NOTE  CHIEF COMPLAINT   Hospital follow up for nonhealing wound    HOSPITAL COURSE     58-year-old man with quadriplegic spinal paralysis and numerous decubitus ulcers who is followed by wound care is brought in today due to fever. He had wound cultures done 3 days ago that have grown Proteus mirabilis and Klebsiella pneumoniae. Sensitivities are not complete. The wounds were noted to have significant odor and heavy green drainage. In addition the patient had some nausea and vomiting and home health noted that his blood pressure was dipping. He was advised to come to the emergency room. Wound cultures were drawn from the right hip wound. His wife reports that when the wound is pressed above pus comes out. Both Klebsiella and Proteus maybe treated with fluoroquinolones which are both IV and oral. The patient's wife preferred if the patient came home so that she could attend to his wounds however the patient expressed a desire to be admitted.     Today:  No acute events overnight.       OBJECTIVE/PHYSICAL EXAM     VITAL SIGNS (MOST RECENT):  Temp: 99.6 °F (37.6 °C) (03/10/24 0714)  Pulse: 65 (03/10/24 0714)  Resp: 18 (03/09/24 2101)  BP: (!) 148/87 (03/10/24 0714)  SpO2: 97 % (03/09/24 1945) VITAL SIGNS (24 HOUR RANGE):  Temp:  [97.5 °F (36.4 °C)-99.8 °F (37.7 °C)] 99.6 °F (37.6 °C)  Pulse:  [] 65  Resp:  [18] 18  SpO2:  [97 %-100 %] 97 %  BP: (148-161)/(84-95) 148/87     GENERAL: In no acute distress, afebrile  HEENT:  PERRLA  CHEST: Clear to auscultation bilaterally  HEART: S1, S2, no appreciable murmur  ABDOMEN: Soft, nontender, BS +  MSK: Warm, no lower extremity edema, no clubbing or cyanosis  NEUROLOGIC: Alert and oriented x4  INTEGUMENTARY:  See media for wounds    ASSESSMENT/PLAN     Nonhealing chronic wound   VRE, Proteus, Pseudomonas, Klebsiella  S/p debridement with Dr. Jean on 02/27/2024  Zyvox and Tobramycin - will need 6  weeks of treatment given exposed bone starting 03/02/2024-04/12/2024  Wound VAC and wound care     Post op anemia   H&H improved s/p 2u pRBC 02/28/2024 and 1u pRBC on 02/29/2024  No anticoagulation at this time - aspirin d/c 02/28/2024     Hyperglycemia in setting of DM  A1c 02/23/2024 8.5%  Reports taking 80u Levemir BID  Continue home Sitagliptin  Continue to titrate long and short-acting insulin     Hypotension - resolved     GISSEL  Hyperkalemia  NS 75cc/hr x 1L   Repeat potassium and treat accordingly      Hx of: Neurogenic bladder, Quadriplegia     DVT prophylaxis:  SCDs, history of bleeding in the recent past    Anticipated discharge and disposition:  Can potentially consider home antibiotics however many limiting factors including wound VAC and regular wound care  __________________________________________________________________________    LABS/MICRO/MEDS/DIAGNOSTICS     LABS  Recent Labs     03/08/24 0528 03/08/24  1422     --    K 5.2* 4.6   CHLORIDE 105  --    CO2 20*  --    BUN 34.9*  --    CREATININE 1.14  --    GLUCOSE 204*  --    CALCIUM 8.6  --      Recent Labs     03/08/24  0528   WBC 9.99   RBC 4.12*   HCT 35.1*   MCV 85.2        MICROBIOLOGY  Microbiology Results (last 7 days)       ** No results found for the last 168 hours. **             MEDICATIONS   acetaminophen  650 mg Oral QHS    alteplase  2 mg Intra-Catheter Once    ascorbic acid (vitamin C)  500 mg Oral Daily    cetirizine  10 mg Oral Daily    collagenase   Topical (Top) Daily    ferrous sulfate  1 tablet Oral Daily    insulin aspart U-100  15 Units Subcutaneous TIDWM    insulin detemir U-100  20 Units Subcutaneous BID    Lactobacillus acidophilus  1 capsule Oral TID WM    linezolid  600 mg Oral Q12H    miconazole NITRATE 2 %   Topical (Top) BID    pantoprazole  40 mg Oral Daily    SITagliptin phosphate  100 mg Oral Daily    sodium chloride 0.9%  10 mL Intravenous Q6H    tobramycin IV (PEDS and ADULTS)  400 mg Intravenous  Q36H         INFUSIONS   sodium chloride 0.9%            DIAGNOSTIC TESTS  X-Ray Chest 1 View   Final Result      Left PICC tip overlies the mid SVC.         Electronically signed by: Oj Solomon   Date:    02/28/2024   Time:    14:29           EF   Date Value Ref Range Status   05/09/2023 60 % Final   07/18/2022 40 % Final        NUTRITION STATUS  Patient meets ASPEN criteria for   malnutrition of   per RD assessment as evidenced by:                       A minimum of two characteristics is recommended for diagnosis of either severe or non-severe malnutrition.     Case related differential diagnoses have been reviewed; assessment and plan has been documented. I have personally reviewed the labs and test results that are currently available; I have reviewed the patients medication list. I have reviewed the consulting providers recommendations. I have reviewed or attempted to review medical records based upon their availability.  All of the patient's and/or family's questions have been addressed and answered to the best of my ability.  I will continue to monitor closely and make adjustments to medical management as needed.  This document was created using M*Modal Fluency Direct.  Transcription errors may have been made.  Please contact me if any questions may rise regarding documentation to clarify transcription.      Jeanette Mann MD   Internal Medicine  Department of Hospital Medicine [facility]

## 2024-03-10 NOTE — PROGRESS NOTES
Pharmacokinetic Follow Up: Tobramycin    Assessment of levels:   The trough level was drawn ~35 hours post-dose and can be used to guide therapy at this time. Above goal trough of <1 mcg/mL.     Regimen Plan:   Change regimen from Tobramycin 400 mg IV every 36 hours to tobramycin 400 mg IV every 48 hours.   Next trough level to be drawn 3/12 @ 2100.    Drug levels (last 3 results):    Recent Labs   Lab Result Units 03/09/24  0512 03/10/24  0853   Tobramycin Peak ug/ml 6.9  --    Tobramycin Trough ug/ml  --  1.8     Pharmacy will continue to monitor.    Please contact pharmacy with any questions regarding this assessment.    Thank you for the consult,   Elaine Dixon      Patient brief summary:  Antonio Galvan II is a 58 y.o. male initiated on aminoglycoside therapy for treatment of skin & soft tissue infection.    Drug Allergies:   Review of patient's allergies indicates:  No Known Allergies    Actual Body Weight:   95 kg    Adjust Body Weight:   77.7 kg    Ideal Body Weight:  66.1 kg    Renal Function:   Estimated Creatinine Clearance: 79.5 mL/min (based on SCr of 1.14 mg/dL).,     Dialysis Method (if applicable):  N/A    CBC (last 72 hours):  Recent Labs   Lab Result Units 03/08/24  0528   WBC x10(3)/mcL 9.99   Hgb g/dL 11.2*   Hct % 35.1*   Platelet x10(3)/mcL 280   Mono % % 4.8   Eos % % 6.1   Basophil % % 0.5         Metabolic Panel (last 72 hours):  Recent Labs   Lab Result Units 03/08/24  0528 03/08/24  1422   Sodium Level mmol/L 137  --    Potassium Level mmol/L 5.2* 4.6   Chloride mmol/L 105  --    Carbon Dioxide mmol/L 20*  --    Glucose Level mg/dL 204*  --    Blood Urea Nitrogen mg/dL 34.9*  --    Creatinine mg/dL 1.14  --          Aminoglycoside Administrations:  aminoglycosides given in last 96 hours                     tobramycin (NEBCIN) 400 mg in dextrose 5 % (D5W) 100 mL IVPB (mg) 400 mg New Bag 03/08/24 2142     400 mg New Bag 03/07/24 0917     400 mg New Bag 03/05/24 2244                     Microbiologic Results:  Microbiology Results (last 7 days)       ** No results found for the last 168 hours. **

## 2024-03-11 LAB
ALBUMIN SERPL-MCNC: 2 G/DL (ref 3.5–5)
ALBUMIN/GLOB SERPL: 0.6 RATIO (ref 1.1–2)
ALP SERPL-CCNC: 83 UNIT/L (ref 40–150)
ALT SERPL-CCNC: 14 UNIT/L (ref 0–55)
AST SERPL-CCNC: 6 UNIT/L (ref 5–34)
BASOPHILS # BLD AUTO: 0.04 X10(3)/MCL
BASOPHILS NFR BLD AUTO: 0.5 %
BILIRUB SERPL-MCNC: 0.2 MG/DL
BUN SERPL-MCNC: 34 MG/DL (ref 8.4–25.7)
CALCIUM SERPL-MCNC: 8.4 MG/DL (ref 8.4–10.2)
CHLORIDE SERPL-SCNC: 108 MMOL/L (ref 98–107)
CO2 SERPL-SCNC: 21 MMOL/L (ref 22–29)
CREAT SERPL-MCNC: 1.22 MG/DL (ref 0.73–1.18)
EOSINOPHIL # BLD AUTO: 0.72 X10(3)/MCL (ref 0–0.9)
EOSINOPHIL NFR BLD AUTO: 8.4 %
ERYTHROCYTE [DISTWIDTH] IN BLOOD BY AUTOMATED COUNT: 16.8 % (ref 11.5–17)
GFR SERPLBLD CREATININE-BSD FMLA CKD-EPI: >60 MLS/MIN/1.73/M2
GLOBULIN SER-MCNC: 3.5 GM/DL (ref 2.4–3.5)
GLUCOSE SERPL-MCNC: 151 MG/DL (ref 70–110)
GLUCOSE SERPL-MCNC: 153 MG/DL (ref 70–110)
GLUCOSE SERPL-MCNC: 186 MG/DL (ref 70–110)
GLUCOSE SERPL-MCNC: 187 MG/DL (ref 74–100)
HCT VFR BLD AUTO: 33.6 % (ref 42–52)
HGB BLD-MCNC: 10.2 G/DL (ref 14–18)
IMM GRANULOCYTES # BLD AUTO: 0.03 X10(3)/MCL (ref 0–0.04)
IMM GRANULOCYTES NFR BLD AUTO: 0.3 %
LYMPHOCYTES # BLD AUTO: 2.89 X10(3)/MCL (ref 0.6–4.6)
LYMPHOCYTES NFR BLD AUTO: 33.7 %
MAGNESIUM SERPL-MCNC: 1.8 MG/DL (ref 1.6–2.6)
MCH RBC QN AUTO: 26.6 PG (ref 27–31)
MCHC RBC AUTO-ENTMCNC: 30.4 G/DL (ref 33–36)
MCV RBC AUTO: 87.5 FL (ref 80–94)
MONOCYTES # BLD AUTO: 0.4 X10(3)/MCL (ref 0.1–1.3)
MONOCYTES NFR BLD AUTO: 4.7 %
NEUTROPHILS # BLD AUTO: 4.5 X10(3)/MCL (ref 2.1–9.2)
NEUTROPHILS NFR BLD AUTO: 52.4 %
PHOSPHATE SERPL-MCNC: 4.5 MG/DL (ref 2.3–4.7)
PLATELET # BLD AUTO: 273 X10(3)/MCL (ref 130–400)
PMV BLD AUTO: 8.9 FL (ref 7.4–10.4)
POTASSIUM SERPL-SCNC: 4.8 MMOL/L (ref 3.5–5.1)
PROT SERPL-MCNC: 5.5 GM/DL (ref 6.4–8.3)
RBC # BLD AUTO: 3.84 X10(6)/MCL (ref 4.7–6.1)
SODIUM SERPL-SCNC: 138 MMOL/L (ref 136–145)
WBC # SPEC AUTO: 8.58 X10(3)/MCL (ref 4.5–11.5)

## 2024-03-11 PROCEDURE — 63600175 PHARM REV CODE 636 W HCPCS: Performed by: PHYSICIAN ASSISTANT

## 2024-03-11 PROCEDURE — 25000003 PHARM REV CODE 250: Performed by: INTERNAL MEDICINE

## 2024-03-11 PROCEDURE — 83735 ASSAY OF MAGNESIUM: CPT | Performed by: INTERNAL MEDICINE

## 2024-03-11 PROCEDURE — 25000003 PHARM REV CODE 250: Performed by: PHYSICIAN ASSISTANT

## 2024-03-11 PROCEDURE — A4216 STERILE WATER/SALINE, 10 ML: HCPCS | Performed by: INTERNAL MEDICINE

## 2024-03-11 PROCEDURE — 25000003 PHARM REV CODE 250: Performed by: STUDENT IN AN ORGANIZED HEALTH CARE EDUCATION/TRAINING PROGRAM

## 2024-03-11 PROCEDURE — 84100 ASSAY OF PHOSPHORUS: CPT | Performed by: INTERNAL MEDICINE

## 2024-03-11 PROCEDURE — 63600175 PHARM REV CODE 636 W HCPCS: Performed by: STUDENT IN AN ORGANIZED HEALTH CARE EDUCATION/TRAINING PROGRAM

## 2024-03-11 PROCEDURE — 80053 COMPREHEN METABOLIC PANEL: CPT | Performed by: INTERNAL MEDICINE

## 2024-03-11 PROCEDURE — 97110 THERAPEUTIC EXERCISES: CPT

## 2024-03-11 PROCEDURE — 97606 NEG PRS WND THER DME>50 SQCM: CPT

## 2024-03-11 PROCEDURE — 99900035 HC TECH TIME PER 15 MIN (STAT)

## 2024-03-11 PROCEDURE — 11000004 HC SNF PRIVATE

## 2024-03-11 PROCEDURE — 94760 N-INVAS EAR/PLS OXIMETRY 1: CPT

## 2024-03-11 PROCEDURE — 27000207 HC ISOLATION

## 2024-03-11 PROCEDURE — 85025 COMPLETE CBC W/AUTO DIFF WBC: CPT | Performed by: INTERNAL MEDICINE

## 2024-03-11 PROCEDURE — 97530 THERAPEUTIC ACTIVITIES: CPT

## 2024-03-11 RX ADMIN — LINEZOLID 600 MG: 600 TABLET, FILM COATED ORAL at 09:03

## 2024-03-11 RX ADMIN — SODIUM CHLORIDE, PRESERVATIVE FREE 10 ML: 5 INJECTION INTRAVENOUS at 06:03

## 2024-03-11 RX ADMIN — INSULIN DETEMIR 20 UNITS: 100 INJECTION, SOLUTION SUBCUTANEOUS at 09:03

## 2024-03-11 RX ADMIN — INSULIN ASPART 15 UNITS: 100 INJECTION, SOLUTION INTRAVENOUS; SUBCUTANEOUS at 05:03

## 2024-03-11 RX ADMIN — CETIRIZINE HYDROCHLORIDE 10 MG: 10 TABLET, FILM COATED ORAL at 09:03

## 2024-03-11 RX ADMIN — Medication 1 CAPSULE: at 05:03

## 2024-03-11 RX ADMIN — INSULIN ASPART 2 UNITS: 100 INJECTION, SOLUTION INTRAVENOUS; SUBCUTANEOUS at 05:03

## 2024-03-11 RX ADMIN — SODIUM CHLORIDE: 9 INJECTION, SOLUTION INTRAVENOUS at 05:03

## 2024-03-11 RX ADMIN — SODIUM CHLORIDE, PRESERVATIVE FREE 10 ML: 5 INJECTION INTRAVENOUS at 05:03

## 2024-03-11 RX ADMIN — Medication 1 CAPSULE: at 09:03

## 2024-03-11 RX ADMIN — PANTOPRAZOLE SODIUM 40 MG: 40 TABLET, DELAYED RELEASE ORAL at 09:03

## 2024-03-11 RX ADMIN — COLLAGENASE SANTYL: 250 OINTMENT TOPICAL at 09:03

## 2024-03-11 RX ADMIN — INSULIN ASPART 15 UNITS: 100 INJECTION, SOLUTION INTRAVENOUS; SUBCUTANEOUS at 09:03

## 2024-03-11 RX ADMIN — ACETAMINOPHEN 650 MG: 325 TABLET ORAL at 09:03

## 2024-03-11 RX ADMIN — OXYCODONE HYDROCHLORIDE AND ACETAMINOPHEN 500 MG: 500 TABLET ORAL at 09:03

## 2024-03-11 RX ADMIN — Medication 1 CAPSULE: at 12:03

## 2024-03-11 RX ADMIN — FERROUS SULFATE TAB 325 MG (65 MG ELEMENTAL FE) 1 EACH: 325 (65 FE) TAB at 09:03

## 2024-03-11 RX ADMIN — HYDROCODONE BITARTRATE AND ACETAMINOPHEN 1 TABLET: 7.5; 325 TABLET ORAL at 09:03

## 2024-03-11 RX ADMIN — INSULIN ASPART 15 UNITS: 100 INJECTION, SOLUTION INTRAVENOUS; SUBCUTANEOUS at 12:03

## 2024-03-11 RX ADMIN — SITAGLIPTIN 100 MG: 50 TABLET, FILM COATED ORAL at 09:03

## 2024-03-11 RX ADMIN — INSULIN ASPART 1 UNITS: 100 INJECTION, SOLUTION INTRAVENOUS; SUBCUTANEOUS at 09:03

## 2024-03-11 RX ADMIN — SODIUM CHLORIDE, PRESERVATIVE FREE 10 ML: 5 INJECTION INTRAVENOUS at 12:03

## 2024-03-11 NOTE — PT/OT/SLP PROGRESS
Name: Antonio Galvan II    : 1965 (58 y.o.)  MRN: [id number]           Patient Unavailable      Patient unable to be seen at this time secondary to: PT attempted at 11 am, but pt undergoing wound care. PT to return at 1430.

## 2024-03-11 NOTE — PROGRESS NOTES
[facility]  \[facility]\"" MEDICINE ~ PROGRESS NOTE  CHIEF COMPLAINT   Hospital follow up for nonhealing wound    HOSPITAL COURSE     58-year-old man with quadriplegic spinal paralysis and numerous decubitus ulcers who is followed by wound care is brought in today due to fever. He had wound cultures done 3 days ago that have grown Proteus mirabilis and Klebsiella pneumoniae. Sensitivities are not complete. The wounds were noted to have significant odor and heavy green drainage. In addition the patient had some nausea and vomiting and home health noted that his blood pressure was dipping. He was advised to come to the emergency room. Wound cultures were drawn from the right hip wound. His wife reports that when the wound is pressed above pus comes out. Both Klebsiella and Proteus maybe treated with fluoroquinolones which are both IV and oral. The patient's wife preferred if the patient came home so that she could attend to his wounds however the patient expressed a desire to be admitted.     Today:  No reported complaints today.  Glucose much better controlled over the last 24-48 hours.  Nursing states patient is occasionally refusing scheduled turns.    OBJECTIVE/PHYSICAL EXAM     VITAL SIGNS (MOST RECENT):  Temp: 98.1 °F (36.7 °C) (03/11/24 0936)  Pulse: (!) 131 (03/11/24 0936)  Resp: 20 (03/11/24 0943)  BP: (!) 152/94 (03/11/24 0936)  SpO2: 100 % (03/11/24 0936) VITAL SIGNS (24 HOUR RANGE):  Temp:  [98.1 °F (36.7 °C)-99.6 °F (37.6 °C)] 98.1 °F (36.7 °C)  Pulse:  [] 131  Resp:  [18-20] 20  SpO2:  [96 %-100 %] 100 %  BP: (111-158)/(73-94) 152/94     GENERAL: In no acute distress, afebrile  HEENT:  PERRLA  CHEST: Clear to auscultation bilaterally  HEART: S1, S2, no appreciable murmur  ABDOMEN: Soft, nontender, BS +  MSK: Warm, no lower extremity edema, no clubbing or cyanosis  NEUROLOGIC: Alert and oriented x4  INTEGUMENTARY:  See media for wounds    ASSESSMENT/PLAN     Nonhealing  chronic wound   VRE, Proteus, Pseudomonas, Klebsiella  S/p debridement with Dr. Jean on 02/27/2024  Zyvox and Tobramycin - will need 6 weeks of treatment given exposed bone starting 03/02/2024-04/12/2024  Wound VAC and wound care     Post op anemia   H&H improved s/p 2u pRBC 02/28/2024 and 1u pRBC on 02/29/2024  No anticoagulation at this time - aspirin d/c 02/28/2024     Hyperglycemia in setting of DM  A1c 02/23/2024 8.5%  Reports taking 80u Levemir BID  Continue home Sitagliptin  Continue to titrate long and short-acting insulin     Hypotension - resolved     GISSEL  Hyperkalemia - suspect false elevation  NS 50cc/hr      Hx of: Neurogenic bladder, Quadriplegia     DVT prophylaxis:  SCDs, history of bleeding in the recent past    Anticipated discharge and disposition:  Remain admitted for completion of IV antibiotics as he is on tobramycin and requires troughs to be drawn.  Continue wound care.  __________________________________________________________________________    LABS/MICRO/MEDS/DIAGNOSTICS     LABS  Recent Labs     03/11/24  0509      K 4.8   CHLORIDE 108*   CO2 21*   BUN 34.0*   CREATININE 1.22*   GLUCOSE 187*   CALCIUM 8.4   ALKPHOS 83   AST 6   ALT 14   ALBUMIN 2.0*     Recent Labs     03/11/24  0509   WBC 8.58   RBC 3.84*   HCT 33.6*   MCV 87.5        MICROBIOLOGY  Microbiology Results (last 7 days)       ** No results found for the last 168 hours. **             MEDICATIONS   acetaminophen  650 mg Oral QHS    ascorbic acid (vitamin C)  500 mg Oral Daily    cetirizine  10 mg Oral Daily    collagenase   Topical (Top) Daily    ferrous sulfate  1 tablet Oral Daily    insulin aspart U-100  15 Units Subcutaneous TIDWM    insulin detemir U-100  20 Units Subcutaneous BID    Lactobacillus acidophilus  1 capsule Oral TID WM    linezolid  600 mg Oral Q12H    miconazole NITRATE 2 %   Topical (Top) BID    pantoprazole  40 mg Oral Daily    SITagliptin phosphate  100 mg Oral Daily    sodium  chloride 0.9%  10 mL Intravenous Q6H    tobramycin IV (PEDS and ADULTS)  400 mg Intravenous Q48H         INFUSIONS   sodium chloride 0.9% 50 mL/hr at 03/11/24 0509          DIAGNOSTIC TESTS  X-Ray Chest 1 View   Final Result      Left PICC tip overlies the mid SVC.         Electronically signed by: Oj Solomon   Date:    02/28/2024   Time:    14:29           EF   Date Value Ref Range Status   05/09/2023 60 % Final   07/18/2022 40 % Final        NUTRITION STATUS  Patient meets ASPEN criteria for   malnutrition of   per RD assessment as evidenced by:                       A minimum of two characteristics is recommended for diagnosis of either severe or non-severe malnutrition.     Case related differential diagnoses have been reviewed; assessment and plan has been documented. I have personally reviewed the labs and test results that are currently available; I have reviewed the patients medication list. I have reviewed the consulting providers recommendations. I have reviewed or attempted to review medical records based upon their availability.  All of the patient's and/or family's questions have been addressed and answered to the best of my ability.  I will continue to monitor closely and make adjustments to medical management as needed.  This document was created using M*Modal Fluency Direct.  Transcription errors may have been made.  Please contact me if any questions may rise regarding documentation to clarify transcription.      ANTHONY Vazquez   Internal Medicine  Department of Hospital Medicine  [facility]

## 2024-03-11 NOTE — PLAN OF CARE
Problem: Adult Inpatient Plan of Care  Goal: Plan of Care Review  Outcome: Ongoing, Progressing  Flowsheets (Taken 3/11/2024 1513)  Plan of Care Reviewed With:   patient   family  Goal: Patient-Specific Goal (Individualized)  Outcome: Ongoing, Progressing  Flowsheets (Taken 3/11/2024 1513)  Anxieties, Fears or Concerns: wound care  Individualized Care Needs: monitor blood sugars, make sure wound vac seal is intact, monitor suprpubic catheter site, pain management, and sleep management  Goal: Absence of Hospital-Acquired Illness or Injury  Outcome: Ongoing, Progressing  Intervention: Identify and Manage Fall Risk  Flowsheets (Taken 3/11/2024 1513)  Safety Promotion/Fall Prevention:   assistive device/personal item within reach   bed alarm set   family to remain at bedside   instructed to call staff for mobility   room near unit station  Intervention: Prevent Skin Injury  Flowsheets (Taken 3/11/2024 1513)  Body Position: sitting up in bed  Skin Protection:   adhesive use limited   incontinence pads utilized   tubing/devices free from skin contact   transparent dressing maintained  Intervention: Prevent and Manage VTE (Venous Thromboembolism) Risk  Flowsheets (Taken 3/11/2024 1513)  Activity Management: Rolling - L1  VTE Prevention/Management:   bleeding precations maintained   bleeding risk assessed   intravenous hydration  Range of Motion: active ROM (range of motion) encouraged  Intervention: Prevent Infection  Flowsheets (Taken 3/11/2024 1513)  Infection Prevention:   cohorting utilized   equipment surfaces disinfected   hand hygiene promoted   personal protective equipment utilized  Goal: Optimal Comfort and Wellbeing  Outcome: Ongoing, Progressing  Intervention: Monitor Pain and Promote Comfort  Flowsheets (Taken 3/11/2024 1513)  Pain Management Interventions:   care clustered   relaxation techniques promoted   quiet environment facilitated   pain management plan reviewed with patient/caregiver  Intervention:  Provide Person-Centered Care  Flowsheets (Taken 3/11/2024 1513)  Trust Relationship/Rapport:   care explained   choices provided   questions encouraged  Goal: Readiness for Transition of Care  Outcome: Ongoing, Progressing  Intervention: Mutually Develop Transition Plan  Flowsheets (Taken 3/11/2024 1513)  Communicated SADE with patient/caregiver: Date not available/Unable to determine     Problem: Diabetes Comorbidity  Goal: Blood Glucose Level Within Targeted Range  Outcome: Ongoing, Progressing  Intervention: Monitor and Manage Glycemia  Flowsheets (Taken 3/11/2024 1513)  Glycemic Management:   blood glucose monitored   oral hydration promoted   supplemental insulin given     Problem: Skin Injury Risk Increased  Goal: Skin Health and Integrity  Outcome: Ongoing, Progressing  Intervention: Optimize Skin Protection  Flowsheets (Taken 3/11/2024 1513)  Pressure Reduction Techniques:   frequent weight shift encouraged   weight shift assistance provided   heels elevated off bed   pressure points protected   positioned off wounds  Pressure Reduction Devices:   heel offloading device utilized   positioning supports utilized   foam padding utilized   alternating pressure pump (ADD)   specialty bed utilized   pressure-redistributing mattress utilized  Skin Protection:   adhesive use limited   incontinence pads utilized   tubing/devices free from skin contact   transparent dressing maintained  Head of Bed (HOB) Positioning: HOB at 20-30 degrees  Intervention: Promote and Optimize Oral Intake  Flowsheets (Taken 3/11/2024 1513)  Oral Nutrition Promotion:   calorie-dense foods provided   calorie-dense liquids provided   physical activity promoted   safe use of adaptive equipment encouraged     Problem: Impaired Wound Healing  Goal: Optimal Wound Healing  Outcome: Ongoing, Progressing  Intervention: Promote Wound Healing  Flowsheets (Taken 3/11/2024 1513)  Oral Nutrition Promotion:   calorie-dense foods provided   calorie-dense  liquids provided   physical activity promoted   safe use of adaptive equipment encouraged  Sleep/Rest Enhancement:   regular sleep/rest pattern promoted   awakenings minimized  Activity Management: Rolling - L1  Pain Management Interventions:   care clustered   relaxation techniques promoted   quiet environment facilitated   pain management plan reviewed with patient/caregiver     Problem: Fall Injury Risk  Goal: Absence of Fall and Fall-Related Injury  Outcome: Ongoing, Progressing  Intervention: Identify and Manage Contributors  Flowsheets (Taken 3/11/2024 1513)  Self-Care Promotion:   independence encouraged   BADL personal objects within reach  Medication Review/Management: medications reviewed  Intervention: Promote Injury-Free Environment  Flowsheets (Taken 3/11/2024 1513)  Safety Promotion/Fall Prevention:   assistive device/personal item within reach   bed alarm set   family to remain at bedside   instructed to call staff for mobility   room near unit station     Problem: Infection  Goal: Absence of Infection Signs and Symptoms  Outcome: Ongoing, Progressing  Intervention: Prevent or Manage Infection  Flowsheets (Taken 3/11/2024 1513)  Fever Reduction/Comfort Measures:   lightweight bedding   lightweight clothing  Infection Management: aseptic technique maintained  Isolation Precautions:   contact   precautions maintained

## 2024-03-11 NOTE — PROGRESS NOTES
Follow up assessment performed.  L anterior/posterior ankle ulcerations closed/resurfaced today.   Only small open area remains to R heel ulceration.   Re-applied dressings to both per existing order.  Will re-assess at end of week and modify dressing change regimen to these sites if newly closed skin matures.   Improvement noted to Lhip ulceration as well as sacral and R buttock ulcerations with current regimen.   NPWT intact and suctioning @ -125mmHg upon arrival in room.   Changed both wound vac dressings and obtained intact seal, bridging dressings to R hip.   Adaptic utilized in undermining area around Black granufoam.  Pressure prevention measures continue during hospitalization as ordered.             03/11/24 1022   WOCN Assessment   WOCN Total Time (mins) 100   Visit Date 03/11/24   Consult Type Follow Up   WOCN Speciality Wound   Wound pressure   Continence Type Fecal   Procedure wound vac   Intervention assessed   Teaching on-going   Skin Interventions   Device Skin Pressure Protection absorbent pad utilized/changed;positioning supports utilized;pressure points protected   Pressure Reduction Devices specialty bed utilized;alternating pressure pump (ADD);heel offloading device utilized;positioning supports utilized   Pressure Reduction Techniques weight shift assistance provided;pressure points protected;positioned off wounds;heels elevated off bed   Pressure Injury Prevention    Check Moisture Management Pad Done   Sacral Foam Dressing   (wound vac dressing in use)   Heel protection technique Heel boot   Check Medical Devices Done        Altered Skin Integrity 05/06/22 1140 Right Heel  Full thickness tissue loss. Subcutaneous fat may be visible but bone, tendon or muscle are not exposed   Date First Assessed/Time First Assessed: 05/06/22 1140   Altered Skin Integrity Present on Admission: yes  Side: Right  Location: Heel  Is this injury device related?: No  Primary Wound Type: (c)   Description of  Altered Skin Integrity: Full thickness...   Wound Image    Dressing Appearance Intact;Dried drainage   Drainage Amount Scant   Drainage Characteristics/Odor Sanguineous;No odor;Bleeding controlled   Appearance Red;Not granulating   Tissue loss description Full thickness   Red (%), Wound Tissue Color 100 %   Periwound Area Scar tissue;Dry;Excoriated   Wound Edges Irregular   Wound Length (cm) 0.5 cm   Wound Width (cm) 0.3 cm   Wound Depth (cm) 0.1 cm   Wound Volume (cm^3) 0.015 cm^3   Wound Surface Area (cm^2) 0.15 cm^2   Care Cleansed with:;Antimicrobial agent;Wound cleanser   Dressing Applied;Non-adherent;Hydrofiber;Silver;Gauze;Rolled gauze   Periwound Care Dry periwound area maintained   Off Loading Other (see comments)  (heel lift boot)   Dressing Change Due 03/13/24        Altered Skin Integrity 01/12/23 1530 Sacral spine Full thickness tissue loss with exposed bone, tendon, or muscle. Often includes undermining and tunneling. May extend into muscle and/or supporting structures.   Date First Assessed/Time First Assessed: 01/12/23 1530   Altered Skin Integrity Present on Admission - Did Patient arrive to the hospital with altered skin?: yes  Location: Sacral spine  Description of Altered Skin Integrity: Full thickness tissue los...   Wound Image    Description of Altered Skin Integrity Full thickness tissue loss with exposed bone, tendon, or muscle. Often includes undermining and tunneling. May extend into muscle and/or supporting structures.   Dressing Appearance Intact;Moist drainage   Drainage Amount Small   Drainage Characteristics/Odor Serosanguineous   Appearance Red;Granulating;Tan;Yellow;White;Fibrin;Other (see comments);Slough  (connective tissue)   Tissue loss description Full thickness   Red (%), Wound Tissue Color 50 %   Yellow (%), Wound Tissue Color 50 %   Periwound Area Moist;Denuded   Wound Edges Defined   Wound Length (cm) 8.5 cm   Wound Width (cm) 8 cm   Wound Depth (cm) 2.5 cm   Wound Volume  (cm^3) 170 cm^3   Wound Surface Area (cm^2) 68 cm^2   Care Cleansed with:;Antimicrobial agent;Wound cleanser   Dressing Other (comment);Changed  (black granufoam, vac drape)   Periwound Care Hydrocolloid barrier applied;Other (see comments)  (mastisol for additional adhesive)   Dressing Change Due 03/14/24        Altered Skin Integrity 08/01/23 1534 Left posterior Ankle Full thickness tissue loss. Base is covered by slough and/or eschar in the wound bed   Date First Assessed/Time First Assessed: 08/01/23 1534   Altered Skin Integrity Present on Admission - Did Patient arrive to the hospital with altered skin?: yes  Side: Left  Orientation: posterior  Location: Ankle  Description of Altered Skin Integri...   Wound Image    Dressing Appearance Intact;Dry;Clean   Drainage Amount None   Appearance Purple;Maroon;Closed/resurfaced;Epithelialization   Tissue loss description Not applicable   Periwound Area Scar tissue;Intact   Wound Length (cm) 0 cm   Wound Width (cm) 0 cm   Wound Surface Area (cm^2) 0 cm^2   Care Cleansed with:;Antimicrobial agent;Wound cleanser   Dressing Applied;Non-adherent;Gauze;Rolled gauze   Dressing Change Due 03/13/24        Altered Skin Integrity 08/01/23 1535 Left anterior Ankle Other (comment) Full thickness tissue loss. Base is covered by slough and/or eschar in the wound bed   Date First Assessed/Time First Assessed: 08/01/23 1535   Altered Skin Integrity Present on Admission - Did Patient arrive to the hospital with altered skin?: yes  Side: Left  Orientation: anterior  Location: Ankle  Is this injury device related?: No  ...   Wound Image    Dressing Appearance Intact;Dry;Clean   Drainage Amount None   Appearance Red;Maroon;Closed/resurfaced;Dry;Epithelialization   Tissue loss description Not applicable   Periwound Area Intact;Dry;Scar tissue   Wound Length (cm) 0 cm   Wound Width (cm) 0 cm   Wound Depth (cm) 0 cm   Wound Volume (cm^3) 0 cm^3   Wound Surface Area (cm^2) 0 cm^2   Care  Cleansed with:;Antimicrobial agent;Wound cleanser   Dressing Applied;Non-adherent;Gauze;Rolled gauze   Dressing Change Due 03/13/24        Incision/Site 08/16/23 2011 Left Hip lateral   Date First Assessed/Time First Assessed: 08/16/23 2011   Side: Left  Location: Hip  Orientation: lateral   Wound Image    Dressing Appearance Intact;Moist drainage   Drainage Amount Small   Drainage Characteristics/Odor Serous;No odor;Bleeding controlled   Appearance Pink;Red;Granulating;Tan;Fibrin   Red (%), Wound Tissue Color 95 %   Yellow (%), Wound Tissue Color 5 %   Periwound Area Dry;Scar tissue   Wound Edges Defined   Wound Length (cm) 3.8 cm   Wound Width (cm) 2.5 cm   Wound Depth (cm) 1.3 cm   Wound Volume (cm^3) 12.35 cm^3   Wound Surface Area (cm^2) 9.5 cm^2   Undermining (depth (cm)/location) 2.5cm @ 3 o'clock/ undermining from 1 - 11 o'clock   Care Cleansed with:;Antimicrobial agent;Wound cleanser   Dressing Changed;Sodium chloride impregnated;Gauze;Absorptive Pad;Other (comment)  (vashe moistened mesalt, medfix tape)   Periwound Care Dry periwound area maintained   Dressing Change Due 03/12/24        Incision/Site 02/27/24 1450 Right Buttocks lateral   Date First Assessed/Time First Assessed: 02/27/24 1450   Side: Right  Location: Buttocks  Orientation: lateral  Additional Comments: mesalt, quick clot, gauze, abdomen pad, and tape   Wound Image     Dressing Appearance Intact;Moist drainage   Drainage Amount Moderate   Drainage Characteristics/Odor Serosanguineous;No odor;Bleeding controlled   Appearance Pink;Red;Granulating;Gray;Tan;Fibrin;Slough;Muscle;Other (see comments);Bone  (connective tissue)   Red (%), Wound Tissue Color 90 %   Yellow (%), Wound Tissue Color 10 %   Periwound Area Scar tissue;Moist   Wound Edges Defined   Wound Length (cm) 7 cm   Wound Width (cm) 12.5 cm   Wound Depth (cm) 1.3 cm   Wound Volume (cm^3) 113.75 cm^3   Wound Surface Area (cm^2) 87.5 cm^2   Undermining (depth (cm)/location) 4.5cm at 1  o'clock / undermining from 12-2 o'clock   Care Cleansed with:;Antimicrobial agent;Wound cleanser   Dressing Changed;Other (comment)  (adaptic / black granufoam/ vac drape)   Periwound Care Dry periwound area maintained;Hydrocolloid barrier applied;Other (see comments)  (mastisol utilized for additional adhesive)   Dressing Change Due 03/14/24        Negative Pressure Wound Therapy  02/29/24 1227   Placement Date/Time: 02/29/24 1227   Location: Sacral Spine   NPWT Type Vacuum Therapy   Therapy Setting NPWT Continuous therapy   Pressure Setting NPWT 125 mmHg   Therapy Interventions NPWT Dressing changed;Seal intact   Sponges Inserted NPWT Black;4

## 2024-03-11 NOTE — PT/OT/SLP PROGRESS
"         Physical Therapy Treatment Note           Name: Antonio Galvan II    : 1965 (58 y.o.)  MRN: [id number]           TREATMENT SUMMARY AND RECOMMENDATIONS:    PT Received On: 24  PT Start Time: 1435     PT Stop Time: 1517  PT Total Time (min): 42 min     Subjective Assessment:   No complaints  Lethargic   x Awake, alert, cooperative  Uncooperative    Agitated  c/o pain    Appropriate  c/o fatigue    Confused x Treated at bedside     Emotionally labile  Treated in gym/dept.    Impulsive  Other:    Flat affect       Therapy Precautions:    Cognitive deficits  Spinal precautions    Collar - hard  Sternal precautions    Collar - soft   TLSO    Fall risk  LSO    Hip precautions - posterior  Knee immobilizer    Hip precautions - anterior  WBAT    Impaired communication  Partial weightbearing    Oxygen  TTWB    PEG tube  NWB    Visual deficits x Other: Wound Vac, super pubic cath.     Hearing deficits          Treatment Objectives:     Mobility Training:   Assist level Comments    Bed mobility Total A Rolling side to side and scooting trunk for sheet placements.  Proper positioning provided at completion of tx tasks to ensure joint alignment and reduce skin break down   Transfer Total A Supine<>SIt   Gait     Sit to stand transitions     Sitting balance MAX A Sitting EOB x 18 min with monitoring of BP.    Standing balance      Wheelchair mobility     Car transfer     Other:          Therapeutic Exercise:   Exercise Sets Reps Comments   All UE/LE and trunk ROM   During sitting and repositioning tasks to ensure proper alignment . Focus on R lateral trunk shift in sitting.                          Additional Comments:  Wound care reports wounds are looking "beautiful" and gave therapy the ok to keep sitting EOB with pt. Pt reports sitting up feels good since he has been in bed for months.     Assessment: Patient tolerated session well, able to complete sitting EOB with less autonomic responses " demonstrated.      PT Plan: continue per POC  Revisions made to plan of care: No    GOALS:   Multidisciplinary Problems       Physical Therapy Goals          Problem: Physical Therapy    Goal Priority Disciplines Outcome Goal Variances Interventions   Physical Therapy Goal     PT, PT/OT Ongoing, Progressing     Description: Goals to be met by: Discharge     Pt to be seen for ROM tasks QD to ensure proper positioning can be obtained to overall reduce impacts of prolonged bed rest and skin break down  Progress pending healing stages of current wounds    Patient will increase functional independence with mobility by performin. Pt to tolerate PROM tasks to B UE and LE, while obtaining 25% improvement in range.   2. Sitting EOB to tolerance pending wound healing - with pt demonstrating normal BP  3. Progress to OOB into chair once able.                          Skilled PT Minutes Provided: 38   Communication with Treatment Team:     Equipment recommendations:       At end of treatment, patient remained:   Up in chair     Up in wheelchair in room   x In bed    With alarm activated    Bed rails up   x Call bell in reach    x Family/friends present    Restraints secured properly    In bathroom with CNA/RN notified   x Nurse aware    In gym with therapist/tech    Other:

## 2024-03-11 NOTE — PLAN OF CARE
Problem: Adult Inpatient Plan of Care  Goal: Plan of Care Review  Outcome: Ongoing, Progressing  Flowsheets (Taken 3/10/2024 1934)  Plan of Care Reviewed With:   patient   family  Goal: Patient-Specific Goal (Individualized)  Outcome: Ongoing, Progressing  Flowsheets (Taken 3/10/2024 1934)  Anxieties, Fears or Concerns: Wound care  Individualized Care Needs: Monitor blood sugar, Make sure wound vac seal is intact, Monitor suprapubic cath site, Pain and sleep management  Goal: Absence of Hospital-Acquired Illness or Injury  Outcome: Ongoing, Progressing  Intervention: Identify and Manage Fall Risk  Flowsheets (Taken 3/10/2024 1934)  Safety Promotion/Fall Prevention:   assistive device/personal item within reach   bed alarm set   lighting adjusted   medications reviewed   muscle strengthening facilitated   nonskid shoes/socks when out of bed   room near unit station   diversional activities provided  Intervention: Prevent Skin Injury  Flowsheets (Taken 3/10/2024 1934)  Body Position: sitting up in bed  Skin Protection:   adhesive use limited   incontinence pads utilized   skin-to-skin areas padded   transparent dressing maintained   skin-to-device areas padded   tubing/devices free from skin contact  Intervention: Prevent and Manage VTE (Venous Thromboembolism) Risk  Flowsheets (Taken 3/10/2024 1934)  Activity Management: Rolling - L1  VTE Prevention/Management:   bleeding risk assessed   bleeding precations maintained   fluids promoted   ROM (active) performed  Range of Motion: ROM (range of motion) performed  Intervention: Prevent Infection  Flowsheets (Taken 3/10/2024 1934)  Infection Prevention:   cohorting utilized   hand hygiene promoted   single patient room provided  Goal: Optimal Comfort and Wellbeing  Outcome: Ongoing, Progressing  Intervention: Monitor Pain and Promote Comfort  Flowsheets (Taken 3/10/2024 1934)  Pain Management Interventions:   diversional activity provided   pillow support provided    position adjusted   quiet environment facilitated  Intervention: Provide Person-Centered Care  Flowsheets (Taken 3/10/2024 1934)  Trust Relationship/Rapport:   care explained   choices provided   questions answered   thoughts/feelings acknowledged     Problem: Diabetes Comorbidity  Goal: Blood Glucose Level Within Targeted Range  Outcome: Ongoing, Progressing  Intervention: Monitor and Manage Glycemia  Flowsheets (Taken 3/10/2024 1934)  Glycemic Management:   blood glucose monitored   insulin dose matched to carbohydrate intake     Problem: Skin Injury Risk Increased  Goal: Skin Health and Integrity  Outcome: Ongoing, Progressing  Intervention: Optimize Skin Protection  Flowsheets (Taken 3/10/2024 1934)  Pressure Reduction Techniques:   frequent weight shift encouraged   rest period provided between sit times   weight shift assistance provided  Pressure Reduction Devices: positioning supports utilized  Skin Protection:   adhesive use limited   incontinence pads utilized   skin-to-skin areas padded   transparent dressing maintained   skin-to-device areas padded   tubing/devices free from skin contact  Head of Bed (HOB) Positioning: HOB at 30-45 degrees  Intervention: Promote and Optimize Oral Intake  Flowsheets (Taken 3/10/2024 1934)  Oral Nutrition Promotion:   calorie-dense foods provided   calorie-dense liquids provided   safe use of adaptive equipment encouraged     Problem: Impaired Wound Healing  Goal: Optimal Wound Healing  Outcome: Ongoing, Progressing  Intervention: Promote Wound Healing  Flowsheets (Taken 3/10/2024 1934)  Oral Nutrition Promotion:   calorie-dense foods provided   calorie-dense liquids provided   safe use of adaptive equipment encouraged  Sleep/Rest Enhancement:   consistent schedule promoted   regular sleep/rest pattern promoted  Activity Management: Rolling - L1  Pain Management Interventions:   diversional activity provided   pillow support provided   position adjusted   quiet environment  facilitated     Problem: Fall Injury Risk  Goal: Absence of Fall and Fall-Related Injury  Outcome: Ongoing, Progressing  Intervention: Identify and Manage Contributors  Flowsheets (Taken 3/10/2024 1934)  Self-Care Promotion:   BADL personal objects within reach   BADL personal routines maintained   safe use of adaptive equipment encouraged  Medication Review/Management: medications reviewed  Intervention: Promote Injury-Free Environment  Flowsheets (Taken 3/10/2024 1934)  Safety Promotion/Fall Prevention:   assistive device/personal item within reach   bed alarm set   lighting adjusted   medications reviewed   muscle strengthening facilitated   nonskid shoes/socks when out of bed   room near unit station   diversional activities provided     Problem: Infection  Goal: Absence of Infection Signs and Symptoms  Outcome: Ongoing, Progressing  Intervention: Prevent or Manage Infection  Flowsheets (Taken 3/10/2024 1934)  Fever Reduction/Comfort Measures:   lightweight bedding   lightweight clothing  Infection Management: aseptic technique maintained  Isolation Precautions:   contact   precautions maintained

## 2024-03-12 LAB
GLUCOSE SERPL-MCNC: 126 MG/DL (ref 70–110)
GLUCOSE SERPL-MCNC: 153 MG/DL (ref 70–110)
GLUCOSE SERPL-MCNC: 186 MG/DL (ref 70–110)
GLUCOSE SERPL-MCNC: 188 MG/DL (ref 70–110)
TOBRAMYCIN TROUGH SERPL-MCNC: 1 UG/ML (ref 0–2)

## 2024-03-12 PROCEDURE — 63600175 PHARM REV CODE 636 W HCPCS: Performed by: STUDENT IN AN ORGANIZED HEALTH CARE EDUCATION/TRAINING PROGRAM

## 2024-03-12 PROCEDURE — 11000004 HC SNF PRIVATE

## 2024-03-12 PROCEDURE — 80200 ASSAY OF TOBRAMYCIN: CPT | Performed by: STUDENT IN AN ORGANIZED HEALTH CARE EDUCATION/TRAINING PROGRAM

## 2024-03-12 PROCEDURE — 25000003 PHARM REV CODE 250: Performed by: STUDENT IN AN ORGANIZED HEALTH CARE EDUCATION/TRAINING PROGRAM

## 2024-03-12 PROCEDURE — 99900035 HC TECH TIME PER 15 MIN (STAT)

## 2024-03-12 PROCEDURE — A4216 STERILE WATER/SALINE, 10 ML: HCPCS | Performed by: INTERNAL MEDICINE

## 2024-03-12 PROCEDURE — 63600175 PHARM REV CODE 636 W HCPCS: Performed by: PHYSICIAN ASSISTANT

## 2024-03-12 PROCEDURE — 27000207 HC ISOLATION

## 2024-03-12 PROCEDURE — 97110 THERAPEUTIC EXERCISES: CPT

## 2024-03-12 PROCEDURE — 25000003 PHARM REV CODE 250: Performed by: PHYSICIAN ASSISTANT

## 2024-03-12 PROCEDURE — 94760 N-INVAS EAR/PLS OXIMETRY 1: CPT

## 2024-03-12 PROCEDURE — 25000003 PHARM REV CODE 250: Performed by: INTERNAL MEDICINE

## 2024-03-12 RX ADMIN — INSULIN ASPART 15 UNITS: 100 INJECTION, SOLUTION INTRAVENOUS; SUBCUTANEOUS at 04:03

## 2024-03-12 RX ADMIN — ACETAMINOPHEN 650 MG: 325 TABLET ORAL at 08:03

## 2024-03-12 RX ADMIN — Medication 1 CAPSULE: at 09:03

## 2024-03-12 RX ADMIN — Medication 1 CAPSULE: at 04:03

## 2024-03-12 RX ADMIN — SODIUM CHLORIDE, PRESERVATIVE FREE 10 ML: 5 INJECTION INTRAVENOUS at 11:03

## 2024-03-12 RX ADMIN — SITAGLIPTIN 100 MG: 50 TABLET, FILM COATED ORAL at 09:03

## 2024-03-12 RX ADMIN — HYDROCODONE BITARTRATE AND ACETAMINOPHEN 1 TABLET: 7.5; 325 TABLET ORAL at 05:03

## 2024-03-12 RX ADMIN — SODIUM CHLORIDE: 9 INJECTION, SOLUTION INTRAVENOUS at 11:03

## 2024-03-12 RX ADMIN — LINEZOLID 600 MG: 600 TABLET, FILM COATED ORAL at 08:03

## 2024-03-12 RX ADMIN — INSULIN ASPART 15 UNITS: 100 INJECTION, SOLUTION INTRAVENOUS; SUBCUTANEOUS at 11:03

## 2024-03-12 RX ADMIN — OXYCODONE HYDROCHLORIDE AND ACETAMINOPHEN 500 MG: 500 TABLET ORAL at 09:03

## 2024-03-12 RX ADMIN — INSULIN DETEMIR 20 UNITS: 100 INJECTION, SOLUTION SUBCUTANEOUS at 09:03

## 2024-03-12 RX ADMIN — CETIRIZINE HYDROCHLORIDE 10 MG: 10 TABLET, FILM COATED ORAL at 09:03

## 2024-03-12 RX ADMIN — SODIUM CHLORIDE: 9 INJECTION, SOLUTION INTRAVENOUS at 02:03

## 2024-03-12 RX ADMIN — SODIUM CHLORIDE, PRESERVATIVE FREE 10 ML: 5 INJECTION INTRAVENOUS at 05:03

## 2024-03-12 RX ADMIN — SODIUM CHLORIDE, PRESERVATIVE FREE 10 ML: 5 INJECTION INTRAVENOUS at 06:03

## 2024-03-12 RX ADMIN — Medication 1 CAPSULE: at 11:03

## 2024-03-12 RX ADMIN — SODIUM CHLORIDE: 9 INJECTION, SOLUTION INTRAVENOUS at 10:03

## 2024-03-12 RX ADMIN — INSULIN ASPART 2 UNITS: 100 INJECTION, SOLUTION INTRAVENOUS; SUBCUTANEOUS at 06:03

## 2024-03-12 RX ADMIN — HYDROCODONE BITARTRATE AND ACETAMINOPHEN 1 TABLET: 7.5; 325 TABLET ORAL at 09:03

## 2024-03-12 RX ADMIN — LINEZOLID 600 MG: 600 TABLET, FILM COATED ORAL at 09:03

## 2024-03-12 RX ADMIN — TOBRAMYCIN SULFATE 400 MG: 40 INJECTION, SOLUTION INTRAMUSCULAR; INTRAVENOUS at 11:03

## 2024-03-12 RX ADMIN — PANTOPRAZOLE SODIUM 40 MG: 40 TABLET, DELAYED RELEASE ORAL at 09:03

## 2024-03-12 RX ADMIN — FERROUS SULFATE TAB 325 MG (65 MG ELEMENTAL FE) 1 EACH: 325 (65 FE) TAB at 09:03

## 2024-03-12 NOTE — PT/OT/SLP PROGRESS
Physical Therapy Treatment Note           Name: Antonio Galvan II    : 1965 (58 y.o.)  MRN: [id number]           TREATMENT SUMMARY AND RECOMMENDATIONS:    PT Received On: 24  PT Start Time: 1400     PT Stop Time: 1427  PT Total Time (min): 27 min     Subjective Assessment:   No complaints  Lethargic   x Awake, alert, cooperative  Uncooperative    Agitated  c/o pain    Appropriate  c/o fatigue    Confused x Treated at bedside     Emotionally labile  Treated in gym/dept.    Impulsive  Other:    Flat affect       Therapy Precautions:    Cognitive deficits  Spinal precautions    Collar - hard  Sternal precautions    Collar - soft   TLSO    Fall risk  LSO    Hip precautions - posterior  Knee immobilizer    Hip precautions - anterior  WBAT    Impaired communication  Partial weightbearing    Oxygen  TTWB    PEG tube  NWB    Visual deficits x Other: Wound Vac, super pubic cath.     Hearing deficits          Treatment Objectives:     Mobility Training:   Assist level Comments    Bed mobility Total A Proper positioning provided at completion of ROM tasks to ensure joint alignment and reduce skin break down   Transfer     Gait     Sit to stand transitions     Sitting balance     Standing balance      Wheelchair mobility     Car transfer     Other:          Therapeutic Exercise:   Exercise Sets Reps Comments   PROM to B UE and LE all joints  20 All functional planes to maintain and improve mobility to maximize positioning ability to reduce skin break down.                          Additional Comments:  Pt with good tolerance, no complaints. Plans to get in WC tomorrow - ok'd 2-3 hours per MD - wounds looking good.   Pt in L side lying at tx end with pillow support as needed.     Assessment: Patient tolerated session well. Excited about getting in chair     PT Plan: continue per POC  Revisions made to plan of care: No    GOALS:   Multidisciplinary Problems       Physical Therapy Goals          Problem:  Physical Therapy    Goal Priority Disciplines Outcome Goal Variances Interventions   Physical Therapy Goal     PT, PT/OT Ongoing, Progressing     Description: Goals to be met by: Discharge     Pt to be seen for ROM tasks QD to ensure proper positioning can be obtained to overall reduce impacts of prolonged bed rest and skin break down  Progress pending healing stages of current wounds    Patient will increase functional independence with mobility by performin. Pt to tolerate PROM tasks to B UE and LE, while obtaining 25% improvement in range.   2. Sitting EOB to tolerance pending wound healing - with pt demonstrating normal BP  3. Progress to OOB into chair once able.                          Skilled PT Minutes Provided: 25   Communication with Treatment Team:     Equipment recommendations:       At end of treatment, patient remained:   Up in chair     Up in wheelchair in room   x In bed    With alarm activated    Bed rails up   x Call bell in reach    x Family/friends present    Restraints secured properly    In bathroom with CNA/RN notified   x Nurse aware    In gym with therapist/tech    Other:

## 2024-03-12 NOTE — PLAN OF CARE
[facility] - [facility]  Discharge Reassessment    Primary Care Provider: Jennifer Fernando NP    Expected Discharge Date:     Reassessment (most recent)       Discharge Reassessment - 03/11/24 1918          Discharge Reassessment    Assessment Type Discharge Planning Reassessment     Did the patient's condition or plan change since previous assessment? No     Discharge Plan discussed with: Patient     Communicated SADE with patient/caregiver Date not available/Unable to determine     Discharge Plan A Home with family;Home Health     DME Needed Upon Discharge  none     Transition of Care Barriers None     Why the patient remains in the hospital Requires continued medical care        Post-Acute Status    Post-Acute Authorization Home Health     Home Health Status Pending medical clearance/testing     Hospital Resources/Appts/Education Provided Provided patient/caregiver with written discharge plan information     Patient choice form signed by patient/caregiver List with quality metrics by geographic area provided     Discharge Delays None known at this time

## 2024-03-12 NOTE — PLAN OF CARE
Problem: Adult Inpatient Plan of Care  Goal: Plan of Care Review  Outcome: Ongoing, Progressing  Flowsheets (Taken 3/12/2024 0211)  Plan of Care Reviewed With:   patient   family  Goal: Patient-Specific Goal (Individualized)  Outcome: Ongoing, Progressing  Flowsheets (Taken 3/12/2024 0211)  Anxieties, Fears or Concerns: none at this time  Individualized Care Needs: Monitor Blood glucose using pt dexcom, Make sure wound vac seal is intact, Monitor suprapubic catheter site, Pain management, Sleep management, Wound care  Goal: Absence of Hospital-Acquired Illness or Injury  Outcome: Ongoing, Progressing  Intervention: Prevent and Manage VTE (Venous Thromboembolism) Risk  Flowsheets (Taken 3/11/2024 2000)  VTE Prevention/Management:   bleeding precations maintained   bleeding risk assessed   dorsiflexion/plantar flexion performed   fluids promoted   intravenous hydration   ROM (active) performed  Goal: Optimal Comfort and Wellbeing  Outcome: Ongoing, Progressing     Problem: Diabetes Comorbidity  Goal: Blood Glucose Level Within Targeted Range  Outcome: Ongoing, Progressing

## 2024-03-12 NOTE — PROGRESS NOTES
[facility]  [facility] MEDICINE ~ PROGRESS NOTE  CHIEF COMPLAINT   Hospital follow up for nonhealing wound    HOSPITAL COURSE     58-year-old man with quadriplegic spinal paralysis and numerous decubitus ulcers who is followed by wound care is brought in today due to fever. He had wound cultures done 3 days ago that have grown Proteus mirabilis and Klebsiella pneumoniae. Sensitivities are not complete. The wounds were noted to have significant odor and heavy green drainage. In addition the patient had some nausea and vomiting and home health noted that his blood pressure was dipping. He was advised to come to the emergency room. Wound cultures were drawn from the right hip wound. His wife reports that when the wound is pressed above pus comes out. Both Klebsiella and Proteus maybe treated with fluoroquinolones which are both IV and oral. The patient's wife preferred if the patient came home so that she could attend to his wounds however the patient expressed a desire to be admitted.     Today:  No reported complaints today.     OBJECTIVE/PHYSICAL EXAM     VITAL SIGNS (MOST RECENT):  Temp: 98.9 °F (37.2 °C) (03/12/24 0955)  Pulse: (!) 130 (03/12/24 0955)  Resp: 18 (03/12/24 0958)  BP: (!) 208/131 (03/12/24 0955)  SpO2: 99 % (03/12/24 0955) VITAL SIGNS (24 HOUR RANGE):  Temp:  [97.5 °F (36.4 °C)-99 °F (37.2 °C)] 98.9 °F (37.2 °C)  Pulse:  [107-133] 130  Resp:  [18] 18  SpO2:  [96 %-99 %] 99 %  BP: ()/() 208/131     GENERAL: In no acute distress, afebrile  HEENT:  PERRLA  CHEST: Clear to auscultation bilaterally  HEART: S1, S2, no appreciable murmur  ABDOMEN: Soft, nontender, BS +  MSK: Warm, no lower extremity edema, no clubbing or cyanosis  NEUROLOGIC: Alert and oriented x4  INTEGUMENTARY:  See media for wounds    ASSESSMENT/PLAN     Nonhealing chronic wound   VRE, Proteus, Pseudomonas, Klebsiella  S/p debridement with Dr. Jean on 02/27/2024  Zyvox and Tobramycin - will  need 6 weeks of treatment given exposed bone starting 03/02/2024-04/12/2024  Wound VAC and wound care     Post op anemia   H&H improved s/p 2u pRBC 02/28/2024 and 1u pRBC on 02/29/2024  No anticoagulation at this time - aspirin d/c 02/28/2024     Hyperglycemia in setting of DM  A1c 02/23/2024 8.5%  Reports taking 80u Levemir BID  Continue home Sitagliptin  Continue to titrate long and short-acting insulin     Hypotension - resolved     GISSEL  Hyperkalemia - suspect false elevation  NS 50cc/hr      Hx of: Neurogenic bladder, Quadriplegia     DVT prophylaxis:  SCDs, history of bleeding in the recent past    Anticipated discharge and disposition:  Remain admitted for completion of IV antibiotics as he is on tobramycin and requires troughs to be drawn.  Continue wound care.  __________________________________________________________________________    LABS/MICRO/MEDS/DIAGNOSTICS     LABS  Recent Labs     03/11/24  0509      K 4.8   CHLORIDE 108*   CO2 21*   BUN 34.0*   CREATININE 1.22*   GLUCOSE 187*   CALCIUM 8.4   ALKPHOS 83   AST 6   ALT 14   ALBUMIN 2.0*     Recent Labs     03/11/24  0509   WBC 8.58   RBC 3.84*   HCT 33.6*   MCV 87.5        MICROBIOLOGY  Microbiology Results (last 7 days)       ** No results found for the last 168 hours. **             MEDICATIONS   acetaminophen  650 mg Oral QHS    ascorbic acid (vitamin C)  500 mg Oral Daily    cetirizine  10 mg Oral Daily    collagenase   Topical (Top) Daily    ferrous sulfate  1 tablet Oral Daily    insulin aspart U-100  15 Units Subcutaneous TIDWM    insulin detemir U-100  20 Units Subcutaneous BID    Lactobacillus acidophilus  1 capsule Oral TID WM    linezolid  600 mg Oral Q12H    miconazole NITRATE 2 %   Topical (Top) BID    pantoprazole  40 mg Oral Daily    SITagliptin phosphate  100 mg Oral Daily    sodium chloride 0.9%  10 mL Intravenous Q6H    tobramycin IV (PEDS and ADULTS)  400 mg Intravenous Q48H         INFUSIONS   sodium chloride 0.9% 50  mL/hr at 03/12/24 0221          DIAGNOSTIC TESTS  X-Ray Chest 1 View   Final Result      Left PICC tip overlies the mid SVC.         Electronically signed by: Oj Solomon   Date:    02/28/2024   Time:    14:29           EF   Date Value Ref Range Status   05/09/2023 60 % Final   07/18/2022 40 % Final        NUTRITION STATUS  Patient meets ASPEN criteria for   malnutrition of   per RD assessment as evidenced by:                       A minimum of two characteristics is recommended for diagnosis of either severe or non-severe malnutrition.     Case related differential diagnoses have been reviewed; assessment and plan has been documented. I have personally reviewed the labs and test results that are currently available; I have reviewed the patients medication list. I have reviewed the consulting providers recommendations. I have reviewed or attempted to review medical records based upon their availability.  All of the patient's and/or family's questions have been addressed and answered to the best of my ability.  I will continue to monitor closely and make adjustments to medical management as needed.  This document was created using M*Modal Fluency Direct.  Transcription errors may have been made.  Please contact me if any questions may rise regarding documentation to clarify transcription.      ANTHONY Vazquez   Internal Medicine  Department of Hospital Medicine [facility]

## 2024-03-12 NOTE — PLAN OF CARE
Problem: Adult Inpatient Plan of Care  Goal: Plan of Care Review  Outcome: Ongoing, Progressing  Flowsheets (Taken 3/12/2024 1427)  Plan of Care Reviewed With:   patient   family  Goal: Patient-Specific Goal (Individualized)  Outcome: Ongoing, Progressing  Flowsheets (Taken 3/12/2024 1427)  Anxieties, Fears or Concerns: wound care  Individualized Care Needs: monitor blood sugars, make sure wound vac seal is intact, monitor suprpubic catheter site, pain management, and sleep management  Goal: Absence of Hospital-Acquired Illness or Injury  Outcome: Ongoing, Progressing  Intervention: Identify and Manage Fall Risk  Flowsheets (Taken 3/12/2024 1427)  Safety Promotion/Fall Prevention:   assistive device/personal item within reach   bed alarm set   family to remain at bedside   instructed to call staff for mobility   room near unit station  Intervention: Prevent Skin Injury  Flowsheets (Taken 3/12/2024 1427)  Body Position: sitting up in bed  Skin Protection:   adhesive use limited   incontinence pads utilized   tubing/devices free from skin contact   transparent dressing maintained  Intervention: Prevent and Manage VTE (Venous Thromboembolism) Risk  Flowsheets (Taken 3/12/2024 1427)  Activity Management: Rolling - L1  VTE Prevention/Management:   bleeding precations maintained   bleeding risk assessed   intravenous hydration  Range of Motion: active ROM (range of motion) encouraged  Intervention: Prevent Infection  Flowsheets (Taken 3/12/2024 1427)  Infection Prevention:   cohorting utilized   equipment surfaces disinfected   hand hygiene promoted   personal protective equipment utilized  Goal: Optimal Comfort and Wellbeing  Outcome: Ongoing, Progressing  Intervention: Monitor Pain and Promote Comfort  Flowsheets (Taken 3/12/2024 1427)  Pain Management Interventions:   care clustered   relaxation techniques promoted   quiet environment facilitated   pain management plan reviewed with patient/caregiver  Intervention:  Provide Person-Centered Care  Flowsheets (Taken 3/12/2024 1427)  Trust Relationship/Rapport:   care explained   choices provided   questions encouraged  Goal: Readiness for Transition of Care  Outcome: Ongoing, Progressing  Intervention: Mutually Develop Transition Plan  Flowsheets (Taken 3/12/2024 1427)  Communicated SADE with patient/caregiver: Date not available/Unable to determine     Problem: Diabetes Comorbidity  Goal: Blood Glucose Level Within Targeted Range  Outcome: Ongoing, Progressing  Intervention: Monitor and Manage Glycemia  Flowsheets (Taken 3/12/2024 1427)  Glycemic Management:   blood glucose monitored   oral hydration promoted   supplemental insulin given     Problem: Skin Injury Risk Increased  Goal: Skin Health and Integrity  Outcome: Ongoing, Progressing  Intervention: Optimize Skin Protection  Flowsheets (Taken 3/12/2024 1427)  Pressure Reduction Techniques:   frequent weight shift encouraged   weight shift assistance provided   heels elevated off bed   pressure points protected   positioned off wounds  Pressure Reduction Devices:   heel offloading device utilized   positioning supports utilized   foam padding utilized   alternating pressure pump (ADD)   specialty bed utilized   pressure-redistributing mattress utilized  Skin Protection:   adhesive use limited   incontinence pads utilized   tubing/devices free from skin contact   transparent dressing maintained  Head of Bed (HOB) Positioning: HOB at 20-30 degrees  Intervention: Promote and Optimize Oral Intake  Flowsheets (Taken 3/12/2024 1427)  Oral Nutrition Promotion:   calorie-dense foods provided   calorie-dense liquids provided   physical activity promoted   safe use of adaptive equipment encouraged     Problem: Impaired Wound Healing  Goal: Optimal Wound Healing  Outcome: Ongoing, Progressing  Intervention: Promote Wound Healing  Flowsheets (Taken 3/12/2024 1427)  Oral Nutrition Promotion:   calorie-dense foods provided   calorie-dense  liquids provided   physical activity promoted   safe use of adaptive equipment encouraged  Sleep/Rest Enhancement:   regular sleep/rest pattern promoted   awakenings minimized  Activity Management: Rolling - L1  Pain Management Interventions:   care clustered   relaxation techniques promoted   quiet environment facilitated   pain management plan reviewed with patient/caregiver     Problem: Fall Injury Risk  Goal: Absence of Fall and Fall-Related Injury  Outcome: Ongoing, Progressing  Intervention: Identify and Manage Contributors  Flowsheets (Taken 3/12/2024 1427)  Self-Care Promotion:   independence encouraged   BADL personal objects within reach  Medication Review/Management: medications reviewed  Intervention: Promote Injury-Free Environment  Flowsheets (Taken 3/12/2024 1427)  Safety Promotion/Fall Prevention:   assistive device/personal item within reach   bed alarm set   family to remain at bedside   instructed to call staff for mobility   room near unit station     Problem: Infection  Goal: Absence of Infection Signs and Symptoms  Outcome: Ongoing, Progressing  Intervention: Prevent or Manage Infection  Flowsheets (Taken 3/12/2024 1427)  Fever Reduction/Comfort Measures:   lightweight bedding   lightweight clothing  Infection Management: aseptic technique maintained  Isolation Precautions:   contact   precautions maintained

## 2024-03-12 NOTE — PROGRESS NOTES
Inpatient Nutrition Evaluation    Admit Date: 2/26/2024   Total duration of encounter: 15 days   Patient Age: 58 y.o.    Nutrition Recommendation/Prescription     Continue regular diet. Adjust diet, per patient request. Requesting sugar free.  2. Continue Arginaid 1 pk BID for wound healing 3. Continue ascorbic acid 500 mg PO daily 4. Weekly weights 5. Monitor intake, wt, labs, medications.      Nutrition Assessment     Chart Review    Reason Seen: continuous nutrition monitoring, length of stay, and follow-up    Malnutrition Screening Tool Results   Have you recently lost weight without trying?: Yes: 34 lbs or more  Have you been eating poorly because of a decreased appetite?: Yes   MST Score: 5   Diagnosis:  Infected wounds/sacrum, abd, feet  Klebsiella pneumoniae, Pseudomonas aeruginosa, Proteus mirabilis  DM,   Hypotension  Hyperglycemia  VRE  A-fib       Relevant Medical History:   Cardiac arrest         7/2022    Chronic skin ulcer      DM (diabetes mellitus)      Infected decubitus ulcer      Lymphedema of leg      Neurogenic bladder      Obesity, unspecified      Pressure ulcer of heel      Pressure ulcer of right foot, stage 3      Pressure ulcer of right ischium      Quadriplegia      Quadriplegic spinal paralysis        Scheduled Medications:  acetaminophen, 650 mg, QHS  ascorbic acid (vitamin C), 500 mg, Daily  cetirizine, 10 mg, Daily  collagenase, , Daily  ferrous sulfate, 1 tablet, Daily  insulin aspart U-100, 15 Units, TIDWM  insulin detemir U-100, 20 Units, BID  Lactobacillus acidophilus, 1 capsule, TID WM  linezolid, 600 mg, Q12H  miconazole NITRATE 2 %, , BID  pantoprazole, 40 mg, Daily  SITagliptin phosphate, 100 mg, Daily  sodium chloride 0.9%, 10 mL, Q6H  tobramycin IV (PEDS and ADULTS), 400 mg, Q48H    Continuous Infusions:  sodium chloride 0.9%, Last Rate: 50 mL/hr at 03/12/24 0221    PRN Medications: 0.9%  NaCl infusion (for blood administration), 0.9%  NaCl infusion (for blood  administration), sodium chloride 0.9%, acetaminophen, albuterol, benzonatate, bisacodyL, budesonide, calcium carbonate, dextrose 10%, dextrose 10%, diphenhydrAMINE, glucagon (human recombinant), glucose, glucose, hydrALAZINE, HYDROcodone-acetaminophen, HYDROcodone-acetaminophen, hydrOXYzine pamoate, insulin aspart U-100, loperamide, melatonin, metoprolol, ondansetron, simethicone, Flushing PICC/Midline Protocol **AND** sodium chloride 0.9% **AND** sodium chloride 0.9%, zolpidem    Recent Labs   Lab 03/06/24  0517 03/07/24  1157 03/08/24  0528 03/08/24  1422 03/11/24  0509   *  --  137  --  138   K 4.8  --  5.2* 4.6 4.8   CALCIUM 8.3*  --  8.6  --  8.4   PHOS  --   --   --   --  4.5   MG  --   --   --   --  1.80   CHLORIDE 102  --  105  --  108*   CO2 22  --  20*  --  21*   BUN 29.9*  --  34.9*  --  34.0*   CREATININE 0.96  --  1.14  --  1.22*   EGFRNORACEVR >60  --  >60  --  >60   GLUCOSE 329*  --  204*  --  187*   BILITOT  --   --   --   --  0.2   ALKPHOS  --   --   --   --  83   ALT  --   --   --   --  14   AST  --   --   --   --  6   ALBUMIN  --   --   --   --  2.0*   PREALB  --  23.7  --   --   --    WBC 8.76  --  9.99  --  8.58   HGB 10.8*  --  11.2*  --  10.2*   HCT 34.9*  --  35.1*  --  33.6*     Nutrition Orders:  Diet Adult Regular  Dietary nutrition supplements Arginaid - Any flavor; BID    Appetite/Oral Intake: good/  Factors Affecting Nutritional Intake:  multiple wound (see wound care nurse reports (3/11/24)  Food/[employer]/Cultural Preferences: none reported  Food Allergies: none reported  Last Bowel Movement: 03/11/24  Wound(s):     Altered Skin Integrity 05/06/22 1140 Right Heel  Full thickness tissue loss. Subcutaneous fat may be visible but bone, tendon or muscle are not exposed-Tissue loss description: Full thickness       Altered Skin Integrity 01/12/23 1530 Sacral spine Full thickness tissue loss with exposed bone, tendon, or muscle. Often includes undermining and tunneling. May extend  "into muscle and/or supporting structures.-Tissue loss description: Full thickness       Altered Skin Integrity 08/01/23 1534 Left posterior Ankle Full thickness tissue loss. Base is covered by slough and/or eschar in the wound bed-Tissue loss description: Not applicable       Altered Skin Integrity 08/01/23 1535 Left anterior Ankle Other (comment) Full thickness tissue loss. Base is covered by slough and/or eschar in the wound bed-Tissue loss description: Not applicable     Comments    Pt reports intake is good. Pt eats outside foods. Intake % of meals. Pt reports wound improved, See wound care nurse report on 3/11/24, Pt on arginaid BID and ascorbic acid daily. Discussed food preferences. Marked menus. Will monitor.     Anthropometrics    Height: 5' 7" (170.2 cm), Height Method: Stated  Last Weight: 95 kg (209 lb 7 oz) (03/04/24 0500), Weight Method: Bed Scale  BMI (Calculated): 32.8  BMI Classification: obese grade I (BMI 30-34.9)        Ideal Body Weight (IBW), Male: 148 lb     % Ideal Body Weight, Male (lb): 139.86 %                          Usual Weight Provided By: unable to obtain usual weight    Wt Readings from Last 5 Encounters:   03/04/24 95 kg (209 lb 7 oz)   02/27/24 93.9 kg (207 lb 0.2 oz)   02/23/24 93.9 kg (207 lb)   08/16/23 106.4 kg (234 lb 9.6 oz)   08/15/23 113.4 kg (250 lb)     Weight Change(s) Since Admission: no new wt recorded since 3/4/24. Notified clinical nurse manager.   Wt Readings from Last 1 Encounters:   03/04/24 0500 95 kg (209 lb 7 oz)   02/26/24 1500 93.9 kg (207 lb)   Admit Weight: 93.9 kg (207 lb) (02/26/24 1500), Weight Method: Bed Scale (Used bed weight from admission 2/23/24 as his bed does not have a scale)    Patient Education     Not applicable.    Nutrition Goals & Monitoring     Dietitian will monitor: food and beverage intake, weight, weight change, electrolyte/renal panel, glucose/endocrine profile, and gastrointestinal profile    Nutrition Risk/Follow-Up: low " (follow-up in 5-7 days)  Patients assigned 'low nutrition risk' status do not qualify for a full nutritional assessment but will be monitored and re-evaluated in a 5-7 day time period. Please consult if re-evaluation needed sooner.

## 2024-03-13 LAB
ANION GAP SERPL CALC-SCNC: 8 MEQ/L
BASOPHILS # BLD AUTO: 0.04 X10(3)/MCL
BASOPHILS NFR BLD AUTO: 0.5 %
BUN SERPL-MCNC: 27.6 MG/DL (ref 8.4–25.7)
CALCIUM SERPL-MCNC: 9 MG/DL (ref 8.4–10.2)
CHLORIDE SERPL-SCNC: 113 MMOL/L (ref 98–107)
CO2 SERPL-SCNC: 19 MMOL/L (ref 22–29)
CREAT SERPL-MCNC: 1.06 MG/DL (ref 0.73–1.18)
CREAT/UREA NIT SERPL: 26
EOSINOPHIL # BLD AUTO: 0.52 X10(3)/MCL (ref 0–0.9)
EOSINOPHIL NFR BLD AUTO: 6.9 %
ERYTHROCYTE [DISTWIDTH] IN BLOOD BY AUTOMATED COUNT: 17.1 % (ref 11.5–17)
GFR SERPLBLD CREATININE-BSD FMLA CKD-EPI: >60 MLS/MIN/1.73/M2
GLUCOSE SERPL-MCNC: 107 MG/DL (ref 70–110)
GLUCOSE SERPL-MCNC: 170 MG/DL (ref 70–110)
GLUCOSE SERPL-MCNC: 174 MG/DL (ref 74–100)
GLUCOSE SERPL-MCNC: 181 MG/DL (ref 70–110)
HCT VFR BLD AUTO: 31.8 % (ref 42–52)
HGB BLD-MCNC: 10 G/DL (ref 14–18)
IMM GRANULOCYTES # BLD AUTO: 0.02 X10(3)/MCL (ref 0–0.04)
IMM GRANULOCYTES NFR BLD AUTO: 0.3 %
LYMPHOCYTES # BLD AUTO: 2.32 X10(3)/MCL (ref 0.6–4.6)
LYMPHOCYTES NFR BLD AUTO: 30.6 %
MCH RBC QN AUTO: 27.5 PG (ref 27–31)
MCHC RBC AUTO-ENTMCNC: 31.4 G/DL (ref 33–36)
MCV RBC AUTO: 87.4 FL (ref 80–94)
MONOCYTES # BLD AUTO: 0.32 X10(3)/MCL (ref 0.1–1.3)
MONOCYTES NFR BLD AUTO: 4.2 %
NEUTROPHILS # BLD AUTO: 4.35 X10(3)/MCL (ref 2.1–9.2)
NEUTROPHILS NFR BLD AUTO: 57.5 %
PLATELET # BLD AUTO: 201 X10(3)/MCL (ref 130–400)
PMV BLD AUTO: 9.1 FL (ref 7.4–10.4)
POCT GLUCOSE: 157 MG/DL (ref 70–110)
POCT GLUCOSE: 91 MG/DL (ref 70–110)
POTASSIUM SERPL-SCNC: 4.6 MMOL/L (ref 3.5–5.1)
PREALB SERPL-MCNC: <3 MG/DL (ref 18–45)
RBC # BLD AUTO: 3.64 X10(6)/MCL (ref 4.7–6.1)
SODIUM SERPL-SCNC: 140 MMOL/L (ref 136–145)
WBC # SPEC AUTO: 7.57 X10(3)/MCL (ref 4.5–11.5)

## 2024-03-13 PROCEDURE — 80048 BASIC METABOLIC PNL TOTAL CA: CPT | Performed by: PHYSICIAN ASSISTANT

## 2024-03-13 PROCEDURE — 27000207 HC ISOLATION

## 2024-03-13 PROCEDURE — 11000004 HC SNF PRIVATE

## 2024-03-13 PROCEDURE — 63600175 PHARM REV CODE 636 W HCPCS: Performed by: PHYSICIAN ASSISTANT

## 2024-03-13 PROCEDURE — 97530 THERAPEUTIC ACTIVITIES: CPT

## 2024-03-13 PROCEDURE — 25000003 PHARM REV CODE 250: Performed by: PHYSICIAN ASSISTANT

## 2024-03-13 PROCEDURE — 94760 N-INVAS EAR/PLS OXIMETRY 1: CPT

## 2024-03-13 PROCEDURE — 25000003 PHARM REV CODE 250: Performed by: STUDENT IN AN ORGANIZED HEALTH CARE EDUCATION/TRAINING PROGRAM

## 2024-03-13 PROCEDURE — 97535 SELF CARE MNGMENT TRAINING: CPT

## 2024-03-13 PROCEDURE — 85025 COMPLETE CBC W/AUTO DIFF WBC: CPT | Performed by: PHYSICIAN ASSISTANT

## 2024-03-13 PROCEDURE — 84134 ASSAY OF PREALBUMIN: CPT | Performed by: PHYSICIAN ASSISTANT

## 2024-03-13 PROCEDURE — A4216 STERILE WATER/SALINE, 10 ML: HCPCS | Performed by: INTERNAL MEDICINE

## 2024-03-13 PROCEDURE — 99900035 HC TECH TIME PER 15 MIN (STAT)

## 2024-03-13 PROCEDURE — 25000003 PHARM REV CODE 250: Performed by: INTERNAL MEDICINE

## 2024-03-13 RX ORDER — INSULIN ASPART 100 [IU]/ML
13 INJECTION, SOLUTION INTRAVENOUS; SUBCUTANEOUS
Status: DISCONTINUED | OUTPATIENT
Start: 2024-03-13 | End: 2024-03-15

## 2024-03-13 RX ADMIN — HYDROCODONE BITARTRATE AND ACETAMINOPHEN 1 TABLET: 7.5; 325 TABLET ORAL at 01:03

## 2024-03-13 RX ADMIN — SITAGLIPTIN 100 MG: 50 TABLET, FILM COATED ORAL at 09:03

## 2024-03-13 RX ADMIN — OXYCODONE HYDROCHLORIDE AND ACETAMINOPHEN 500 MG: 500 TABLET ORAL at 09:03

## 2024-03-13 RX ADMIN — INSULIN ASPART 13 UNITS: 100 INJECTION, SOLUTION INTRAVENOUS; SUBCUTANEOUS at 05:03

## 2024-03-13 RX ADMIN — SODIUM CHLORIDE, PRESERVATIVE FREE 10 ML: 5 INJECTION INTRAVENOUS at 05:03

## 2024-03-13 RX ADMIN — SODIUM CHLORIDE, PRESERVATIVE FREE 10 ML: 5 INJECTION INTRAVENOUS at 12:03

## 2024-03-13 RX ADMIN — SODIUM CHLORIDE: 9 INJECTION, SOLUTION INTRAVENOUS at 02:03

## 2024-03-13 RX ADMIN — Medication 1 CAPSULE: at 11:03

## 2024-03-13 RX ADMIN — INSULIN DETEMIR 20 UNITS: 100 INJECTION, SOLUTION SUBCUTANEOUS at 09:03

## 2024-03-13 RX ADMIN — INSULIN ASPART 2 UNITS: 100 INJECTION, SOLUTION INTRAVENOUS; SUBCUTANEOUS at 06:03

## 2024-03-13 RX ADMIN — SODIUM CHLORIDE, PRESERVATIVE FREE 10 ML: 5 INJECTION INTRAVENOUS at 11:03

## 2024-03-13 RX ADMIN — HYDROCODONE BITARTRATE AND ACETAMINOPHEN 1 TABLET: 7.5; 325 TABLET ORAL at 07:03

## 2024-03-13 RX ADMIN — PANTOPRAZOLE SODIUM 40 MG: 40 TABLET, DELAYED RELEASE ORAL at 09:03

## 2024-03-13 RX ADMIN — Medication 1 CAPSULE: at 05:03

## 2024-03-13 RX ADMIN — SODIUM CHLORIDE, PRESERVATIVE FREE 10 ML: 5 INJECTION INTRAVENOUS at 06:03

## 2024-03-13 RX ADMIN — FERROUS SULFATE TAB 325 MG (65 MG ELEMENTAL FE) 1 EACH: 325 (65 FE) TAB at 09:03

## 2024-03-13 RX ADMIN — LINEZOLID 600 MG: 600 TABLET, FILM COATED ORAL at 09:03

## 2024-03-13 RX ADMIN — Medication 1 CAPSULE: at 07:03

## 2024-03-13 RX ADMIN — ACETAMINOPHEN 650 MG: 325 TABLET ORAL at 09:03

## 2024-03-13 RX ADMIN — INSULIN ASPART 13 UNITS: 100 INJECTION, SOLUTION INTRAVENOUS; SUBCUTANEOUS at 11:03

## 2024-03-13 RX ADMIN — HYDROCODONE BITARTRATE AND ACETAMINOPHEN 1 TABLET: 7.5; 325 TABLET ORAL at 12:03

## 2024-03-13 RX ADMIN — CETIRIZINE HYDROCHLORIDE 10 MG: 10 TABLET, FILM COATED ORAL at 09:03

## 2024-03-13 RX ADMIN — HYDROCODONE BITARTRATE AND ACETAMINOPHEN 1 TABLET: 7.5; 325 TABLET ORAL at 04:03

## 2024-03-13 NOTE — PROGRESS NOTES
[facility]  [facility] MEDICINE ~ PROGRESS NOTE  CHIEF COMPLAINT   Hospital follow up for nonhealing wound    HOSPITAL COURSE     58-year-old man with quadriplegic spinal paralysis and numerous decubitus ulcers who is followed by wound care is brought in today due to fever. He had wound cultures done 3 days ago that have grown Proteus mirabilis and Klebsiella pneumoniae. Sensitivities are not complete. The wounds were noted to have significant odor and heavy green drainage. In addition the patient had some nausea and vomiting and home health noted that his blood pressure was dipping. He was advised to come to the emergency room. Wound cultures were drawn from the right hip wound. His wife reports that when the wound is pressed above pus comes out. Both Klebsiella and Proteus maybe treated with fluoroquinolones which are both IV and oral. The patient's wife preferred if the patient came home so that she could attend to his wounds however the patient expressed a desire to be admitted.     Today:  No reported complaints today.  Patient did have a hypoglycemic episode last night therefore we will decrease aspart to 13 units TID.  Patient may benefit from nighttime snack.    OBJECTIVE/PHYSICAL EXAM     VITAL SIGNS (MOST RECENT):  Temp: 98.4 °F (36.9 °C) (03/13/24 0738)  Pulse: (!) 128 (03/13/24 0755)  Resp: 18 (03/13/24 0755)  BP: (!) 157/113 (03/13/24 0738)  SpO2: 100 % (03/13/24 0755) VITAL SIGNS (24 HOUR RANGE):  Temp:  [98.3 °F (36.8 °C)-98.4 °F (36.9 °C)] 98.4 °F (36.9 °C)  Pulse:  [128-137] 128  Resp:  [18] 18  SpO2:  [97 %-100 %] 100 %  BP: (100-183)/() 157/113     GENERAL: In no acute distress, afebrile  HEENT:  PERRLA  CHEST: Clear to auscultation bilaterally  HEART: S1, S2, no appreciable murmur  ABDOMEN: Soft, nontender, BS +  MSK: Warm, no lower extremity edema, no clubbing or cyanosis  NEUROLOGIC: Alert and oriented x4  INTEGUMENTARY:  See media for  wounds    ASSESSMENT/PLAN     Nonhealing chronic wound   VRE, Proteus, Pseudomonas, Klebsiella  S/p debridement with Dr. Jean on 02/27/2024  Zyvox and Tobramycin - will need 6 weeks of treatment given exposed bone starting 03/02/2024-04/12/2024  Wound VAC and wound care  Pre albumin today     Post op anemia   H&H improved s/p 2u pRBC 02/28/2024 and 1u pRBC on 02/29/2024  No anticoagulation at this time - aspirin d/c 02/28/2024     Hyperglycemia in setting of DM  A1c 02/23/2024 8.5%  Reports taking 80u Levemir BID  Continue home Sitagliptin  Continue to titrate long and short-acting insulin, currently receiving detemir 20 units b.i.d. and aspart 13 units TID with meals     Hypotension - resolved     GISSEL  Hyperkalemia - suspect false elevation  NS 50cc/hr   BNP in a.m. and if renal function continues to improve then discontinue fluids     Hx of: Neurogenic bladder, Quadriplegia     DVT prophylaxis:  SCDs, history of bleeding in the recent past    Anticipated discharge and disposition:  Remain admitted for completion of IV antibiotics as he is on tobramycin and requires troughs to be drawn.  Continue wound care.  __________________________________________________________________________    LABS/MICRO/MEDS/DIAGNOSTICS     LABS  Recent Labs     03/11/24  0509 03/13/24  0556    140   K 4.8 4.6   CHLORIDE 108* 113*   CO2 21* 19*   BUN 34.0* 27.6*   CREATININE 1.22* 1.06   GLUCOSE 187* 174*   CALCIUM 8.4 9.0   ALKPHOS 83  --    AST 6  --    ALT 14  --    ALBUMIN 2.0*  --      Recent Labs     03/13/24  0556   WBC 7.57   RBC 3.64*   HCT 31.8*   MCV 87.4        MICROBIOLOGY  Microbiology Results (last 7 days)       ** No results found for the last 168 hours. **             MEDICATIONS   acetaminophen  650 mg Oral QHS    ascorbic acid (vitamin C)  500 mg Oral Daily    cetirizine  10 mg Oral Daily    collagenase   Topical (Top) Daily    ferrous sulfate  1 tablet Oral Daily    insulin aspart U-100  13 Units  Subcutaneous TIDWM    insulin detemir U-100  20 Units Subcutaneous BID    Lactobacillus acidophilus  1 capsule Oral TID WM    linezolid  600 mg Oral Q12H    miconazole NITRATE 2 %   Topical (Top) BID    pantoprazole  40 mg Oral Daily    SITagliptin phosphate  100 mg Oral Daily    sodium chloride 0.9%  10 mL Intravenous Q6H    tobramycin IV (PEDS and ADULTS)  400 mg Intravenous Q48H         INFUSIONS   sodium chloride 0.9% 50 mL/hr at 03/13/24 0219          DIAGNOSTIC TESTS  X-Ray Chest 1 View   Final Result      Left PICC tip overlies the mid SVC.         Electronically signed by: Oj Solomon   Date:    02/28/2024   Time:    14:29           EF   Date Value Ref Range Status   05/09/2023 60 % Final   07/18/2022 40 % Final        NUTRITION STATUS  Patient meets ASPEN criteria for   malnutrition of   per RD assessment as evidenced by:                       A minimum of two characteristics is recommended for diagnosis of either severe or non-severe malnutrition.     Case related differential diagnoses have been reviewed; assessment and plan has been documented. I have personally reviewed the labs and test results that are currently available; I have reviewed the patients medication list. I have reviewed the consulting providers recommendations. I have reviewed or attempted to review medical records based upon their availability.  All of the patient's and/or family's questions have been addressed and answered to the best of my ability.  I will continue to monitor closely and make adjustments to medical management as needed.  This document was created using M*Modal Fluency Direct.  Transcription errors may have been made.  Please contact me if any questions may rise regarding documentation to clarify transcription.      ANTHONY Vazquez   Internal Medicine  Department of Hospital Medicine [facility]

## 2024-03-13 NOTE — PLAN OF CARE
Problem: Adult Inpatient Plan of Care  Goal: Plan of Care Review  Outcome: Ongoing, Progressing  Flowsheets (Taken 3/13/2024 0031)  Plan of Care Reviewed With: patient  Goal: Patient-Specific Goal (Individualized)  Outcome: Ongoing, Progressing  Flowsheets (Taken 3/13/2024 0031)  Anxieties, Fears or Concerns: none expressed  Individualized Care Needs: monitor CBG, safety, pain mgt  Goal: Absence of Hospital-Acquired Illness or Injury  Outcome: Ongoing, Progressing  Intervention: Identify and Manage Fall Risk  Flowsheets (Taken 3/13/2024 0031)  Safety Promotion/Fall Prevention:   assistive device/personal item within reach   bed alarm set  Intervention: Prevent Skin Injury  Flowsheets (Taken 3/13/2024 0031)  Body Position: sitting up in bed  Skin Protection: adhesive use limited  Intervention: Prevent and Manage VTE (Venous Thromboembolism) Risk  Flowsheets (Taken 3/13/2024 0031)  Activity Management: Rolling - L1  VTE Prevention/Management: bleeding precations maintained  Range of Motion: active ROM (range of motion) encouraged  Intervention: Prevent Infection  Flowsheets (Taken 3/13/2024 0031)  Infection Prevention:   cohorting utilized   personal protective equipment utilized   rest/sleep promoted  Goal: Optimal Comfort and Wellbeing  Outcome: Ongoing, Progressing  Intervention: Monitor Pain and Promote Comfort  Flowsheets (Taken 3/13/2024 0031)  Pain Management Interventions:   care clustered   medication offered  Intervention: Provide Person-Centered Care  Flowsheets (Taken 3/13/2024 0031)  Trust Relationship/Rapport:   care explained   questions answered   reassurance provided   thoughts/feelings acknowledged   empathic listening provided  Goal: Readiness for Transition of Care  Outcome: Ongoing, Progressing  Intervention: Mutually Develop Transition Plan  Flowsheets (Taken 3/13/2024 0031)  Equipment Currently Used at Home:   hospital bed   power chair   lift device  Transportation Anticipated: family or  friend will provide  Who are your caregiver(s) and their phone number(s)?: Judy(mother) [phone]  Communicated SADE with patient/caregiver: Date not available/Unable to determine  Do you expect to return to your current living situation?: Yes  Do you have help at home or someone to help you manage your care at home?: Yes  Readmission within 30 days?: No  Do you currently have service(s) that help you manage your care at home?: No  Is the pt/caregiver preference to resume services with current agency: No     Problem: Diabetes Comorbidity  Goal: Blood Glucose Level Within Targeted Range  Outcome: Ongoing, Progressing  Intervention: Monitor and Manage Glycemia  Flowsheets (Taken 3/13/2024 0031)  Glycemic Management:   blood glucose monitored   supplemental insulin given     Problem: Skin Injury Risk Increased  Goal: Skin Health and Integrity  Outcome: Ongoing, Progressing  Intervention: Optimize Skin Protection  Flowsheets (Taken 3/13/2024 0031)  Pressure Reduction Techniques: weight shift assistance provided  Pressure Reduction Devices: heel offloading device utilized  Skin Protection: adhesive use limited  Head of Bed (HOB) Positioning: HOB at 30-45 degrees  Intervention: Promote and Optimize Oral Intake  Flowsheets (Taken 3/13/2024 0031)  Oral Nutrition Promotion:   calorie-dense foods provided   calorie-dense liquids provided     Problem: Impaired Wound Healing  Goal: Optimal Wound Healing  Outcome: Ongoing, Progressing  Intervention: Promote Wound Healing  Flowsheets (Taken 3/13/2024 0031)  Oral Nutrition Promotion:   calorie-dense foods provided   calorie-dense liquids provided  Sleep/Rest Enhancement:   noise level reduced   regular sleep/rest pattern promoted   relaxation techniques promoted  Activity Management: Rolling - L1  Pain Management Interventions:   care clustered   medication offered     Problem: Fall Injury Risk  Goal: Absence of Fall and Fall-Related Injury  Outcome: Ongoing,  Progressing  Intervention: Identify and Manage Contributors  Flowsheets (Taken 3/13/2024 0031)  Self-Care Promotion:   independence encouraged   BADL personal objects within reach   safe use of adaptive equipment encouraged  Medication Review/Management: medications reviewed  Intervention: Promote Injury-Free Environment  Flowsheets (Taken 3/13/2024 0031)  Safety Promotion/Fall Prevention:   assistive device/personal item within reach   bed alarm set     Problem: Infection  Goal: Absence of Infection Signs and Symptoms  Outcome: Ongoing, Progressing  Intervention: Prevent or Manage Infection  Flowsheets (Taken 3/13/2024 0031)  Fever Reduction/Comfort Measures:   lightweight bedding   lightweight clothing  Infection Management: aseptic technique maintained  Isolation Precautions:   contact   precautions maintained

## 2024-03-13 NOTE — PT/OT/SLP PROGRESS
Physical Therapy Treatment Note           Name: Antonio Galvan II    : 1965 (58 y.o.)  MRN: [id number]           TREATMENT SUMMARY AND RECOMMENDATIONS:    PT Received On: 24  PT Start Time: 1400     PT Stop Time: 1435  PT Total Time (min): 35 min     Subjective Assessment:   No complaints  Lethargic   x Awake, alert, cooperative  Uncooperative    Agitated  c/o pain    Appropriate  c/o fatigue    Confused x Treated at bedside     Emotionally labile  Treated in gym/dept.    Impulsive  Other:    Flat affect       Therapy Precautions:    Cognitive deficits  Spinal precautions    Collar - hard  Sternal precautions    Collar - soft   TLSO    Fall risk  LSO    Hip precautions - posterior  Knee immobilizer    Hip precautions - anterior  WBAT    Impaired communication  Partial weightbearing    Oxygen  TTWB    PEG tube  NWB    Visual deficits x Other: Wound Vac and Super pubic cath    Hearing deficits  x OK 'd to get in chair 2-3 hours per day       Treatment Objectives:     Mobility Training:   Assist level Comments    Bed mobility Total A Rolling side to side for tess placement    Transfer Total  Tess from bed>Chair   Gait     Sit to stand transitions     Sitting balance     Standing balance      Wheelchair mobility     Car transfer     Other:          Therapeutic Exercise:   Exercise Sets Reps Comments                               Additional Comments:  Extra time needed for proper positioning in WC once OOB.     Assessment: Patient tolerated session well - up in chair and outside.     PT Plan: Continue per POC  Revisions made to plan of care: Yes    GOALS:   Multidisciplinary Problems       Physical Therapy Goals          Problem: Physical Therapy    Goal Priority Disciplines Outcome Goal Variances Interventions   Physical Therapy Goal     PT, PT/OT Ongoing, Progressing     Description: Goals to be met by: Discharge     Pt to be seen for ROM tasks QD to ensure proper positioning can be obtained  to overall reduce impacts of prolonged bed rest and skin break down  Progress pending healing stages of current wounds    Patient will increase functional independence with mobility by performin. Pt to tolerate PROM tasks to B UE and LE, while obtaining 25% improvement in range.   2. Sitting EOB to tolerance pending wound healing - with pt demonstrating normal BP  3. Progress to OOB into chair once able. - Met  4. Pt to tolerate up to 3 hours in power chair                         Skilled PT Minutes Provided: 25   Communication with Treatment Team:     Equipment recommendations:       At end of treatment, patient remained:   Up in chair    x Up in wheelchair in room    In bed    With alarm activated    Bed rails up    Call bell in reach    x Family/friends present    Restraints secured properly    In bathroom with CNA/RN notified    Nurse aware    In gym with therapist/tech    Other:

## 2024-03-13 NOTE — PROGRESS NOTES
Name: Antonio Galvan II    : 1965 (58 y.o.)  MRN: [id number]            Interdisciplinary Team Conference     Case conference held with patient/family and care team to discuss progress, plan of care, barriers to be addressed for safe return home, equipment recommendations, and discharge planning. Communicated therapy progress with MD, RN, therapy clinicians and case management. All questions/concerns answered.

## 2024-03-13 NOTE — PLAN OF CARE
Problem: Adult Inpatient Plan of Care  Goal: Plan of Care Review  Outcome: Ongoing, Progressing  Flowsheets (Taken 3/13/2024 1147)  Plan of Care Reviewed With:   patient   family  Goal: Patient-Specific Goal (Individualized)  Outcome: Ongoing, Progressing  Flowsheets (Taken 3/13/2024 1147)  Anxieties, Fears or Concerns: wound care  Individualized Care Needs: monitor blood sugars, make sure wound vac seal is intact, monitor suprpubic catheter site, pain management, and sleep management  Goal: Absence of Hospital-Acquired Illness or Injury  Outcome: Ongoing, Progressing  Intervention: Identify and Manage Fall Risk  Flowsheets (Taken 3/13/2024 1147)  Safety Promotion/Fall Prevention:   assistive device/personal item within reach   bed alarm set   family to remain at bedside   instructed to call staff for mobility   room near unit station  Intervention: Prevent Skin Injury  Flowsheets (Taken 3/13/2024 1147)  Body Position: sitting up in bed  Skin Protection:   adhesive use limited   incontinence pads utilized   tubing/devices free from skin contact   transparent dressing maintained  Intervention: Prevent and Manage VTE (Venous Thromboembolism) Risk  Flowsheets (Taken 3/13/2024 1147)  Activity Management: Rolling - L1  VTE Prevention/Management:   bleeding precations maintained   bleeding risk assessed   intravenous hydration  Range of Motion: active ROM (range of motion) encouraged  Intervention: Prevent Infection  Flowsheets (Taken 3/13/2024 1147)  Infection Prevention:   cohorting utilized   equipment surfaces disinfected   hand hygiene promoted   personal protective equipment utilized  Goal: Optimal Comfort and Wellbeing  Outcome: Ongoing, Progressing  Intervention: Monitor Pain and Promote Comfort  Flowsheets (Taken 3/13/2024 1147)  Pain Management Interventions:   care clustered   relaxation techniques promoted   quiet environment facilitated   pain management plan reviewed with patient/caregiver  Intervention:  Provide Person-Centered Care  Flowsheets (Taken 3/13/2024 1147)  Trust Relationship/Rapport:   care explained   choices provided   questions encouraged  Goal: Readiness for Transition of Care  Outcome: Ongoing, Progressing  Intervention: Mutually Develop Transition Plan  Flowsheets (Taken 3/13/2024 1147)  Communicated SADE with patient/caregiver: Date not available/Unable to determine     Problem: Diabetes Comorbidity  Goal: Blood Glucose Level Within Targeted Range  Outcome: Ongoing, Progressing  Intervention: Monitor and Manage Glycemia  Flowsheets (Taken 3/13/2024 1147)  Glycemic Management:   blood glucose monitored   oral hydration promoted   supplemental insulin given     Problem: Skin Injury Risk Increased  Goal: Skin Health and Integrity  Outcome: Ongoing, Progressing  Intervention: Optimize Skin Protection  Flowsheets (Taken 3/13/2024 1147)  Pressure Reduction Techniques:   frequent weight shift encouraged   weight shift assistance provided   heels elevated off bed   pressure points protected   positioned off wounds  Pressure Reduction Devices:   heel offloading device utilized   positioning supports utilized   foam padding utilized   alternating pressure pump (ADD)   specialty bed utilized   pressure-redistributing mattress utilized  Skin Protection:   adhesive use limited   incontinence pads utilized   tubing/devices free from skin contact   transparent dressing maintained  Head of Bed (HOB) Positioning: HOB at 20-30 degrees  Intervention: Promote and Optimize Oral Intake  Flowsheets (Taken 3/13/2024 1147)  Oral Nutrition Promotion:   calorie-dense foods provided   calorie-dense liquids provided   physical activity promoted   safe use of adaptive equipment encouraged     Problem: Impaired Wound Healing  Goal: Optimal Wound Healing  Outcome: Ongoing, Progressing  Intervention: Promote Wound Healing  Flowsheets (Taken 3/13/2024 1147)  Oral Nutrition Promotion:   calorie-dense foods provided   calorie-dense  liquids provided   physical activity promoted   safe use of adaptive equipment encouraged  Sleep/Rest Enhancement:   regular sleep/rest pattern promoted   awakenings minimized  Activity Management: Rolling - L1  Pain Management Interventions:   care clustered   relaxation techniques promoted   quiet environment facilitated   pain management plan reviewed with patient/caregiver     Problem: Fall Injury Risk  Goal: Absence of Fall and Fall-Related Injury  Outcome: Ongoing, Progressing  Intervention: Identify and Manage Contributors  Flowsheets (Taken 3/13/2024 1147)  Self-Care Promotion:   independence encouraged   BADL personal objects within reach  Medication Review/Management: medications reviewed  Intervention: Promote Injury-Free Environment  Flowsheets (Taken 3/13/2024 1147)  Safety Promotion/Fall Prevention:   assistive device/personal item within reach   bed alarm set   family to remain at bedside   instructed to call staff for mobility   room near unit station     Problem: Infection  Goal: Absence of Infection Signs and Symptoms  Outcome: Ongoing, Progressing  Intervention: Prevent or Manage Infection  Flowsheets (Taken 3/13/2024 1147)  Fever Reduction/Comfort Measures:   lightweight bedding   lightweight clothing  Infection Management: aseptic technique maintained  Isolation Precautions:   contact   precautions maintained

## 2024-03-14 LAB
ANION GAP SERPL CALC-SCNC: 9 MEQ/L
BUN SERPL-MCNC: 30.6 MG/DL (ref 8.4–25.7)
CALCIUM SERPL-MCNC: 8.6 MG/DL (ref 8.4–10.2)
CHLORIDE SERPL-SCNC: 111 MMOL/L (ref 98–107)
CO2 SERPL-SCNC: 20 MMOL/L (ref 22–29)
CREAT SERPL-MCNC: 0.98 MG/DL (ref 0.73–1.18)
CREAT/UREA NIT SERPL: 31
GFR SERPLBLD CREATININE-BSD FMLA CKD-EPI: >60 MLS/MIN/1.73/M2
GLUCOSE SERPL-MCNC: 117 MG/DL (ref 74–100)
GLUCOSE SERPL-MCNC: 124 MG/DL (ref 70–110)
GLUCOSE SERPL-MCNC: 131 MG/DL (ref 70–110)
GLUCOSE SERPL-MCNC: 134 MG/DL (ref 70–110)
GLUCOSE SERPL-MCNC: 158 MG/DL (ref 70–110)
GLUCOSE SERPL-MCNC: 158 MG/DL (ref 70–110)
GLUCOSE SERPL-MCNC: 164 MG/DL (ref 70–110)
POTASSIUM SERPL-SCNC: 4.6 MMOL/L (ref 3.5–5.1)
SODIUM SERPL-SCNC: 140 MMOL/L (ref 136–145)

## 2024-03-14 PROCEDURE — 94760 N-INVAS EAR/PLS OXIMETRY 1: CPT

## 2024-03-14 PROCEDURE — 11000004 HC SNF PRIVATE

## 2024-03-14 PROCEDURE — 99900035 HC TECH TIME PER 15 MIN (STAT)

## 2024-03-14 PROCEDURE — 97606 NEG PRS WND THER DME>50 SQCM: CPT

## 2024-03-14 PROCEDURE — 25000003 PHARM REV CODE 250: Performed by: INTERNAL MEDICINE

## 2024-03-14 PROCEDURE — A4216 STERILE WATER/SALINE, 10 ML: HCPCS | Performed by: INTERNAL MEDICINE

## 2024-03-14 PROCEDURE — 63600175 PHARM REV CODE 636 W HCPCS: Performed by: PHYSICIAN ASSISTANT

## 2024-03-14 PROCEDURE — 97530 THERAPEUTIC ACTIVITIES: CPT

## 2024-03-14 PROCEDURE — 25000003 PHARM REV CODE 250: Performed by: PHYSICIAN ASSISTANT

## 2024-03-14 PROCEDURE — 25000003 PHARM REV CODE 250: Performed by: STUDENT IN AN ORGANIZED HEALTH CARE EDUCATION/TRAINING PROGRAM

## 2024-03-14 PROCEDURE — 80048 BASIC METABOLIC PNL TOTAL CA: CPT | Performed by: PHYSICIAN ASSISTANT

## 2024-03-14 PROCEDURE — 27000207 HC ISOLATION

## 2024-03-14 RX ORDER — DOXYLAMINE SUCCINATE 25 MG
TABLET ORAL 2 TIMES DAILY
Status: DISCONTINUED | OUTPATIENT
Start: 2024-03-14 | End: 2024-04-09 | Stop reason: HOSPADM

## 2024-03-14 RX ADMIN — Medication 1 CAPSULE: at 08:03

## 2024-03-14 RX ADMIN — FERROUS SULFATE TAB 325 MG (65 MG ELEMENTAL FE) 1 EACH: 325 (65 FE) TAB at 08:03

## 2024-03-14 RX ADMIN — INSULIN ASPART 13 UNITS: 100 INJECTION, SOLUTION INTRAVENOUS; SUBCUTANEOUS at 05:03

## 2024-03-14 RX ADMIN — ACETAMINOPHEN 650 MG: 325 TABLET ORAL at 09:03

## 2024-03-14 RX ADMIN — INSULIN DETEMIR 20 UNITS: 100 INJECTION, SOLUTION SUBCUTANEOUS at 09:03

## 2024-03-14 RX ADMIN — SODIUM CHLORIDE, PRESERVATIVE FREE 10 ML: 5 INJECTION INTRAVENOUS at 12:03

## 2024-03-14 RX ADMIN — SODIUM CHLORIDE, PRESERVATIVE FREE 10 ML: 5 INJECTION INTRAVENOUS at 05:03

## 2024-03-14 RX ADMIN — INSULIN ASPART 13 UNITS: 100 INJECTION, SOLUTION INTRAVENOUS; SUBCUTANEOUS at 12:03

## 2024-03-14 RX ADMIN — LINEZOLID 600 MG: 600 TABLET, FILM COATED ORAL at 09:03

## 2024-03-14 RX ADMIN — PANTOPRAZOLE SODIUM 40 MG: 40 TABLET, DELAYED RELEASE ORAL at 08:03

## 2024-03-14 RX ADMIN — Medication 1 CAPSULE: at 07:03

## 2024-03-14 RX ADMIN — HYDROCODONE BITARTRATE AND ACETAMINOPHEN 1 TABLET: 7.5; 325 TABLET ORAL at 04:03

## 2024-03-14 RX ADMIN — CETIRIZINE HYDROCHLORIDE 10 MG: 10 TABLET, FILM COATED ORAL at 08:03

## 2024-03-14 RX ADMIN — SITAGLIPTIN 100 MG: 50 TABLET, FILM COATED ORAL at 08:03

## 2024-03-14 RX ADMIN — LINEZOLID 600 MG: 600 TABLET, FILM COATED ORAL at 08:03

## 2024-03-14 RX ADMIN — HYDROCODONE BITARTRATE AND ACETAMINOPHEN 1 TABLET: 7.5; 325 TABLET ORAL at 08:03

## 2024-03-14 RX ADMIN — COLLAGENASE SANTYL: 250 OINTMENT TOPICAL at 08:03

## 2024-03-14 RX ADMIN — OXYCODONE HYDROCHLORIDE AND ACETAMINOPHEN 500 MG: 500 TABLET ORAL at 08:03

## 2024-03-14 RX ADMIN — SODIUM CHLORIDE, PRESERVATIVE FREE 10 ML: 5 INJECTION INTRAVENOUS at 07:03

## 2024-03-14 RX ADMIN — Medication 1 CAPSULE: at 12:03

## 2024-03-14 NOTE — PROGRESS NOTES
Wound vac dressing change performed to Sacral and R buttock ulcerations.   Both sites continue to decrease in size and increase in granulation tissue with each dressing change.  Reapplied vac dressings, bridging to R hip to prevent additional pressure from tubing.   All pressure prevention measures continue during hospitalization.  Will continue twice weekly dressing changes.    03/14/24 1741   WOCN Assessment   WOCN Total Time (mins) 75   Visit Date 03/14/24   WOCN Speciality Wound   Wound pressure   Procedure wound vac   Intervention assessed   Teaching on-going        Altered Skin Integrity 01/12/23 1530 Sacral spine Full thickness tissue loss with exposed bone, tendon, or muscle. Often includes undermining and tunneling. May extend into muscle and/or supporting structures.   Date First Assessed/Time First Assessed: 01/12/23 1530   Altered Skin Integrity Present on Admission - Did Patient arrive to the hospital with altered skin?: yes  Location: Sacral spine  Description of Altered Skin Integrity: Full thickness tissue los...   Wound Image     Description of Altered Skin Integrity Full thickness tissue loss with exposed bone, tendon, or muscle. Often includes undermining and tunneling. May extend into muscle and/or supporting structures.   Dressing Appearance Intact;Dry   Drainage Amount Moderate   Drainage Characteristics/Odor Serosanguineous;No odor;Bleeding controlled   Appearance Red;Granulating;Tan;Yellow;White;Slough;Other (see comments);Fibrin  (connective tissue)   Tissue loss description Full thickness   Red (%), Wound Tissue Color 55 %   Yellow (%), Wound Tissue Color 45 %   Periwound Area Denuded   Wound Edges Defined   Wound Length (cm) 8.3 cm   Wound Width (cm) 7.5 cm   Wound Depth (cm) 2.4 cm   Wound Volume (cm^3) 149.4 cm^3   Wound Surface Area (cm^2) 62.25 cm^2   Care Cleansed with:;Antimicrobial agent;Wound cleanser   Dressing Changed;Other (comment)  (black granufoam/ drape)   Periwound Care  Skin barrier film applied;Hydrocolloid barrier applied;Other (see comments)  (mastisol for additional adhesive)   Dressing Change Due 03/18/24        Incision/Site 02/27/24 1450 Right Buttocks lateral   Date First Assessed/Time First Assessed: 02/27/24 1450   Side: Right  Location: Buttocks  Orientation: lateral  Additional Comments: mesalt, quick clot, gauze, abdomen pad, and tape   Wound Image     Dressing Appearance Intact;Dry   Drainage Amount Moderate   Drainage Characteristics/Odor Serosanguineous;No odor;Bleeding controlled   Appearance Red;Granulating;Gray;Tan;Fibrin;Other (see comments)  (connective tissue)   Red (%), Wound Tissue Color 95 %   Yellow (%), Wound Tissue Color 5 %   Periwound Area Scar tissue;Moist   Wound Edges Defined   Wound Length (cm) 7 cm   Wound Width (cm) 12.3 cm   Wound Depth (cm) 1.3 cm   Wound Volume (cm^3) 111.93 cm^3   Wound Surface Area (cm^2) 86.1 cm^2   Undermining (depth (cm)/location) 4.8cm at 1 o'clock / undermining from 12-2 o'clock   Care Cleansed with:;Antimicrobial agent;Wound cleanser   Dressing Changed;Other (comment)  (black granufoam/ vac drape)   Periwound Care Skin barrier film applied;Hydrocolloid barrier applied;Other (see comments)  (mastisol for additional adhesive)   Dressing Change Due 03/18/24        Negative Pressure Wound Therapy  02/29/24 1227   Placement Date/Time: 02/29/24 1227   Location: Sacral Spine   NPWT Type Vacuum Therapy   Therapy Setting NPWT Continuous therapy   Pressure Setting NPWT 125 mmHg   Therapy Interventions NPWT Dressing changed;Seal intact   Sponges Inserted NPWT Black;4   Sponges Removed NPWT Black;4

## 2024-03-14 NOTE — PLAN OF CARE
Problem: Adult Inpatient Plan of Care  Goal: Plan of Care Review  Outcome: Ongoing, Progressing  Flowsheets (Taken 3/14/2024 1125)  Plan of Care Reviewed With:   patient   family  Goal: Patient-Specific Goal (Individualized)  Outcome: Ongoing, Progressing  Flowsheets (Taken 3/14/2024 1125)  Anxieties, Fears or Concerns: none  Individualized Care Needs: monitor cbg, safety , wound care  Goal: Absence of Hospital-Acquired Illness or Injury  Outcome: Ongoing, Progressing  Intervention: Identify and Manage Fall Risk  Flowsheets (Taken 3/14/2024 1125)  Safety Promotion/Fall Prevention:   assistive device/personal item within reach   bed alarm set  Intervention: Prevent Skin Injury  Flowsheets (Taken 3/14/2024 1125)  Body Position: sitting up in bed  Skin Protection: adhesive use limited  Intervention: Prevent and Manage VTE (Venous Thromboembolism) Risk  Flowsheets (Taken 3/14/2024 1125)  Activity Management: Rolling - L1  VTE Prevention/Management: bleeding precations maintained  Range of Motion: active ROM (range of motion) encouraged  Intervention: Prevent Infection  Flowsheets (Taken 3/14/2024 1125)  Infection Prevention:   cohorting utilized   personal protective equipment utilized   rest/sleep promoted  Goal: Optimal Comfort and Wellbeing  Outcome: Ongoing, Progressing  Intervention: Monitor Pain and Promote Comfort  Flowsheets (Taken 3/14/2024 1125)  Pain Management Interventions:   care clustered   medication offered  Intervention: Provide Person-Centered Care  Flowsheets (Taken 3/14/2024 1125)  Trust Relationship/Rapport:   care explained   choices provided   emotional support provided   empathic listening provided   questions answered   questions encouraged   reassurance provided   thoughts/feelings acknowledged  Goal: Readiness for Transition of Care  Outcome: Ongoing, Progressing  Intervention: Mutually Develop Transition Plan  Flowsheets (Taken 3/14/2024 1125)  Equipment Currently Used at Home:   hospital  bed   lift device   power chair  Transportation Anticipated: family or friend will provide  Who are your caregiver(s) and their phone number(s)?: [phone] Judy(mother)  Communicated SADE with patient/caregiver: Date not available/Unable to determine  Do you have help at home or someone to help you manage your care at home?: Yes  Readmission within 30 days?: No  Do you currently have service(s) that help you manage your care at home?: No  Is the pt/caregiver preference to resume services with current agency: No     Problem: Diabetes Comorbidity  Goal: Blood Glucose Level Within Targeted Range  Outcome: Ongoing, Progressing  Intervention: Monitor and Manage Glycemia  Flowsheets (Taken 3/14/2024 1125)  Glycemic Management:   blood glucose monitored   supplemental insulin given     Problem: Skin Injury Risk Increased  Goal: Skin Health and Integrity  Outcome: Ongoing, Progressing  Intervention: Optimize Skin Protection  Flowsheets (Taken 3/14/2024 1125)  Pressure Reduction Techniques: frequent weight shift encouraged  Pressure Reduction Devices: heel offloading device utilized  Skin Protection: adhesive use limited  Head of Bed (HOB) Positioning: HOB at 30-45 degrees  Intervention: Promote and Optimize Oral Intake  Flowsheets (Taken 3/14/2024 1125)  Oral Nutrition Promotion:   physical activity promoted   rest periods promoted   safe use of adaptive equipment encouraged     Problem: Impaired Wound Healing  Goal: Optimal Wound Healing  Outcome: Ongoing, Progressing  Intervention: Promote Wound Healing  Flowsheets (Taken 3/14/2024 1125)  Oral Nutrition Promotion:   physical activity promoted   rest periods promoted   safe use of adaptive equipment encouraged  Sleep/Rest Enhancement:   awakenings minimized   consistent schedule promoted   noise level reduced   regular sleep/rest pattern promoted   relaxation techniques promoted  Activity Management: Rolling - L1  Pain Management Interventions:   care clustered    medication offered     Problem: Fall Injury Risk  Goal: Absence of Fall and Fall-Related Injury  Outcome: Ongoing, Progressing  Intervention: Identify and Manage Contributors  Flowsheets (Taken 3/14/2024 1125)  Self-Care Promotion:   independence encouraged   BADL personal objects within reach   BADL personal routines maintained   safe use of adaptive equipment encouraged  Medication Review/Management: medications reviewed  Intervention: Promote Injury-Free Environment  Flowsheets (Taken 3/14/2024 1125)  Safety Promotion/Fall Prevention:   assistive device/personal item within reach   bed alarm set     Problem: Infection  Goal: Absence of Infection Signs and Symptoms  Outcome: Ongoing, Progressing  Intervention: Prevent or Manage Infection  Flowsheets (Taken 3/14/2024 1125)  Fever Reduction/Comfort Measures:   lightweight bedding   lightweight clothing  Infection Management: aseptic technique maintained  Isolation Precautions:   contact   precautions maintained

## 2024-03-14 NOTE — PLAN OF CARE
Weekly Staffing Report      Date Admitted: 2/26/2024 :   Staffing Date: 3/14/2024     Patient Active Problem List   Diagnosis    Uncontrolled type 2 diabetes mellitus with hyperglycemia    Atrial flutter    Constipation    Abnormal urinalysis    Obstructive sleep apnea syndrome    Acute deep vein thrombosis (DVT) of brachial vein of right upper extremity    Quadriplegia    Intolerance of continuous positive airway pressure (CPAP) ventilation    Complex sleep apnea syndrome    Open wound of left hip    Pressure injury of right ischium, stage 4    Osteomyelitis    Sacral decubitus ulcer, stage II    Chronic blood loss anemia    Infected pressure ulcer, unspecified pressure ulcer stage          Team Members Present:       Nursing Present     Yes [x]    No []    Physical Therapy Present    Yes [x]    No []    Occupational Therapy Present     Yes []    No [x]    Speech Therapy Present    Yes []    No []    NA [x]    Dietary Present    Yes [x]    No []        Physician Present     [] Thairy Reyes    [x] Jeanette Mann    [x] ANTHONY Howe    []       Family Present    [] Adult Children    [] Spouse    [] POA    [] Friend/ Caregiver    [] Other       Interdisciplinary Meeting Notes:  Mother, Judy, on phone. PT-sitting on edge of bed tolerance and ROM. Will sit up in chair toda. Appetite- good. Medically- low blood sugar last night. Will adjust insulin. Treating pain. Continue wound vac and abx. All questions answered at this time. Plan to discharge home with home health when ready.                 Please see Documented PT/OT/ST, Dietary, Nursing, and Physician notes for detailed treatment information.

## 2024-03-14 NOTE — PT/OT/SLP PROGRESS
Physical Therapy Treatment Note           Name: Antonio Galvan II    : 1965 (58 y.o.)  MRN: [id number]           TREATMENT SUMMARY AND RECOMMENDATIONS:    PT Received On: 24  PT Start Time: 1351     PT Stop Time: 1417  PT Total Time (min): 26 min     Subjective Assessment:   No complaints  Lethargic   x Awake, alert, cooperative  Uncooperative    Agitated  c/o pain    Appropriate  c/o fatigue    Confused x Treated at bedside     Emotionally labile  Treated in gym/dept.    Impulsive  Other:    Flat affect       Therapy Precautions:    Cognitive deficits  Spinal precautions    Collar - hard  Sternal precautions    Collar - soft   TLSO    Fall risk  LSO    Hip precautions - posterior  Knee immobilizer    Hip precautions - anterior  WBAT    Impaired communication  Partial weightbearing    Oxygen  TTWB    PEG tube  NWB    Visual deficits x Other: Wound Vac and Super pubic cath    Hearing deficits  x OK 'd to get in chair 2-3 hours per day       Treatment Objectives:     Mobility Training:   Assist level Comments    Bed mobility Total A Rolling side to side for tess placement    Transfer Total  Tess from bed>Chair   Gait     Sit to stand transitions     Sitting balance     Standing balance      Wheelchair mobility     Car transfer     Other:          Therapeutic Exercise:   Exercise Sets Reps Comments                               Additional Comments:  Extra time needed for proper positioning in WC once OOB. Wound care coming after clinic, nurse is aware pt will be outside.     Assessment: Patient tolerated session well - up in chair and outside.     PT Plan: Continue per POC  Revisions made to plan of care: No    GOALS:   Multidisciplinary Problems       Physical Therapy Goals          Problem: Physical Therapy    Goal Priority Disciplines Outcome Goal Variances Interventions   Physical Therapy Goal     PT, PT/OT Ongoing, Progressing     Description: Goals to be met by: Discharge     Pt to be  seen for ROM tasks QD to ensure proper positioning can be obtained to overall reduce impacts of prolonged bed rest and skin break down  Progress pending healing stages of current wounds    Patient will increase functional independence with mobility by performin. Pt to tolerate PROM tasks to B UE and LE, while obtaining 25% improvement in range.   2. Sitting EOB to tolerance pending wound healing - with pt demonstrating normal BP  3. Progress to OOB into chair once able. - Met  4. Pt to tolerate up to 3 hours in power chair                         Skilled PT Minutes Provided: 25   Communication with Treatment Team:     Equipment recommendations:       At end of treatment, patient remained:   Up in chair    x Up in wheelchair in room    In bed    With alarm activated    Bed rails up    Call bell in reach    x Family/friends present    Restraints secured properly    In bathroom with CNA/RN notified    Nurse aware    In gym with therapist/tech    Other:

## 2024-03-14 NOTE — PLAN OF CARE
Problem: Adult Inpatient Plan of Care  Goal: Plan of Care Review  Outcome: Ongoing, Progressing  Flowsheets (Taken 3/14/2024 0326)  Plan of Care Reviewed With:   patient   family  Goal: Patient-Specific Goal (Individualized)  Outcome: Ongoing, Progressing  Flowsheets (Taken 3/14/2024 0326)  Anxieties, Fears or Concerns: none at this time  Individualized Care Needs: Monitor wound vac for leakage, Monitor CBG with pt dexcom and administer supplemental insulin as ordered, Safety with mobility, Pain management, PT, IV fluids  Goal: Absence of Hospital-Acquired Illness or Injury  Outcome: Ongoing, Progressing  Intervention: Prevent and Manage VTE (Venous Thromboembolism) Risk  Flowsheets (Taken 3/13/2024 2000)  VTE Prevention/Management:   bleeding precations maintained   bleeding risk assessed   fluids promoted   intravenous hydration   ROM (passive) performed  Intervention: Prevent Infection  Flowsheets (Taken 3/13/2024 2000)  Infection Prevention:   hand hygiene promoted   personal protective equipment utilized   rest/sleep promoted   single patient room provided  Goal: Optimal Comfort and Wellbeing  Outcome: Ongoing, Progressing     Problem: Diabetes Comorbidity  Goal: Blood Glucose Level Within Targeted Range  Outcome: Ongoing, Progressing

## 2024-03-14 NOTE — PLAN OF CARE
[facility]  Discharge Reassessment    Primary Care Provider: Jennifer Fernando NP    Expected Discharge Date:     Reassessment (most recent)       Discharge Reassessment - 03/14/24 1158          Discharge Reassessment    Assessment Type Discharge Planning Reassessment     Did the patient's condition or plan change since previous assessment? No     Discharge Plan discussed with: Parent(s);Patient     Name(s) and Number(s) Judy mother [phone]     Communicated SADE with patient/caregiver Date not available/Unable to determine     Discharge Plan A Home Health;Home with family     DME Needed Upon Discharge  none     Transition of Care Barriers None     Why the patient remains in the hospital Requires continued medical care        Post-Acute Status    Post-Acute Authorization Home Health     Home Health Status Pending medical clearance/testing     Hospital Resources/Appts/Education Provided Provided patient/caregiver with written discharge plan information     Discharge Delays None known at this time

## 2024-03-14 NOTE — PROGRESS NOTES
[facility]  [facility] MEDICINE ~ PROGRESS NOTE  CHIEF COMPLAINT   Hospital follow up for nonhealing wound    HOSPITAL COURSE     58-year-old man with quadriplegic spinal paralysis and numerous decubitus ulcers who is followed by wound care is brought in today due to fever. He had wound cultures done 3 days ago that have grown Proteus mirabilis and Klebsiella pneumoniae. Sensitivities are not complete. The wounds were noted to have significant odor and heavy green drainage. In addition the patient had some nausea and vomiting and home health noted that his blood pressure was dipping. He was advised to come to the emergency room. Wound cultures were drawn from the right hip wound. His wife reports that when the wound is pressed above pus comes out. Both Klebsiella and Proteus maybe treated with fluoroquinolones which are both IV and oral. The patient's wife preferred if the patient came home so that she could attend to his wounds however the patient expressed a desire to be admitted.     Today:  Albumin known to be quite low at < 3. Patient reports the battery to his wound vac  last night and it took some time to reboot once being plugged in. I reported this to nursing staff today.    OBJECTIVE/PHYSICAL EXAM     VITAL SIGNS (MOST RECENT):  Temp: 98.7 °F (37.1 °C) (24 1207)  Pulse: (!) 129 (24 1207)  Resp: 18 (24 0832)  BP: (!) 149/96 (24 1207)  SpO2: 99 % (24 1207) VITAL SIGNS (24 HOUR RANGE):  Temp:  [97.5 °F (36.4 °C)-98.7 °F (37.1 °C)] 98.7 °F (37.1 °C)  Pulse:  [128-131] 129  Resp:  [18] 18  SpO2:  [98 %-100 %] 99 %  BP: ()/() 149/96     GENERAL: In no acute distress, afebrile  HEENT:  PERRLA  CHEST: Clear to auscultation bilaterally  HEART: S1, S2, no appreciable murmur  ABDOMEN: Soft, nontender, BS +  MSK: Warm, no lower extremity edema, no clubbing or cyanosis  NEUROLOGIC: Alert and oriented x4  INTEGUMENTARY:  See media for  wounds    ASSESSMENT/PLAN     Nonhealing chronic wound   VRE, Proteus, Pseudomonas, Klebsiella  S/p debridement with Dr. Jean on 02/27/2024  Zyvox and Tobramycin - will need 6 weeks of treatment given exposed bone 03/02/2024-04/12/2024  Wound VAC and wound care  Pre albumin <3     Post op anemia   H&H improved s/p 2u pRBC 02/28/2024 and 1u pRBC on 02/29/2024  No anticoagulation at this time - aspirin d/c 02/28/2024     Hyperglycemia in setting of DM  A1c 02/23/2024 8.5%  Reports taking 80u Levemir BID  Continue home Sitagliptin  Currently receiving detemir 20 units b.i.d. and aspart 13 units TID with meals  Borderline hypoglycemic episodes - recommend nighttime snack, may need to decrease insulin regimen      Hypotension - resolved     GISSEL  Hyperkalemia - suspect false elevation  Renal function stable - d/c IV fluids      Hx of: Neurogenic bladder, Quadriplegia     DVT prophylaxis:  SCDs, history of bleeding in the recent past    Anticipated discharge and disposition:  Remain admitted for completion of IV antibiotics as he is on tobramycin and requires troughs to be drawn.  Continue wound care.  __________________________________________________________________________    LABS/MICRO/MEDS/DIAGNOSTICS     LABS  Recent Labs     03/14/24  0511      K 4.6   CHLORIDE 111*   CO2 20*   BUN 30.6*   CREATININE 0.98   GLUCOSE 117*   CALCIUM 8.6     Recent Labs     03/13/24  0556   WBC 7.57   RBC 3.64*   HCT 31.8*   MCV 87.4        MICROBIOLOGY  Microbiology Results (last 7 days)       ** No results found for the last 168 hours. **             MEDICATIONS   acetaminophen  650 mg Oral QHS    ascorbic acid (vitamin C)  500 mg Oral Daily    cetirizine  10 mg Oral Daily    collagenase   Topical (Top) Daily    ferrous sulfate  1 tablet Oral Daily    insulin aspart U-100  13 Units Subcutaneous TIDWM    insulin detemir U-100  20 Units Subcutaneous BID    Lactobacillus acidophilus  1 capsule Oral TID WM    linezolid   600 mg Oral Q12H    miconazole NITRATE 2 %   Topical (Top) BID    pantoprazole  40 mg Oral Daily    SITagliptin phosphate  100 mg Oral Daily    sodium chloride 0.9%  10 mL Intravenous Q6H    tobramycin IV (PEDS and ADULTS)  400 mg Intravenous Q48H         INFUSIONS   sodium chloride 0.9% 50 mL/hr at 03/13/24 0219          DIAGNOSTIC TESTS  X-Ray Chest 1 View   Final Result      Left PICC tip overlies the mid SVC.         Electronically signed by: Oj Solomon   Date:    02/28/2024   Time:    14:29           EF   Date Value Ref Range Status   05/09/2023 60 % Final   07/18/2022 40 % Final        NUTRITION STATUS  Patient meets ASPEN criteria for   malnutrition of   per RD assessment as evidenced by:                       A minimum of two characteristics is recommended for diagnosis of either severe or non-severe malnutrition.     Case related differential diagnoses have been reviewed; assessment and plan has been documented. I have personally reviewed the labs and test results that are currently available; I have reviewed the patients medication list. I have reviewed the consulting providers recommendations. I have reviewed or attempted to review medical records based upon their availability.  All of the patient's and/or family's questions have been addressed and answered to the best of my ability.  I will continue to monitor closely and make adjustments to medical management as needed.  This document was created using M*Modal Fluency Direct.  Transcription errors may have been made.  Please contact me if any questions may rise regarding documentation to clarify transcription.      ANTHONY Vazquez   Internal Medicine  Department of Hospital Medicine [facility]

## 2024-03-15 LAB
ALBUMIN SERPL-MCNC: 2.2 G/DL (ref 3.5–5)
CRP SERPL-MCNC: 48.7 MG/L
GLUCOSE SERPL-MCNC: 115 MG/DL (ref 70–110)
GLUCOSE SERPL-MCNC: 116 MG/DL (ref 70–110)
GLUCOSE SERPL-MCNC: 120 MG/DL (ref 70–110)
GLUCOSE SERPL-MCNC: 186 MG/DL (ref 70–110)
TRANSFERRIN SERPL-MCNC: 158 MG/DL (ref 174–364)

## 2024-03-15 PROCEDURE — 25000003 PHARM REV CODE 250: Performed by: PHYSICIAN ASSISTANT

## 2024-03-15 PROCEDURE — 27000207 HC ISOLATION

## 2024-03-15 PROCEDURE — 25000003 PHARM REV CODE 250: Performed by: INTERNAL MEDICINE

## 2024-03-15 PROCEDURE — 84466 ASSAY OF TRANSFERRIN: CPT | Performed by: PHYSICIAN ASSISTANT

## 2024-03-15 PROCEDURE — 97530 THERAPEUTIC ACTIVITIES: CPT

## 2024-03-15 PROCEDURE — 63600175 PHARM REV CODE 636 W HCPCS: Performed by: PHYSICIAN ASSISTANT

## 2024-03-15 PROCEDURE — 25000003 PHARM REV CODE 250: Performed by: STUDENT IN AN ORGANIZED HEALTH CARE EDUCATION/TRAINING PROGRAM

## 2024-03-15 PROCEDURE — 94760 N-INVAS EAR/PLS OXIMETRY 1: CPT

## 2024-03-15 PROCEDURE — A4216 STERILE WATER/SALINE, 10 ML: HCPCS | Performed by: INTERNAL MEDICINE

## 2024-03-15 PROCEDURE — 99900035 HC TECH TIME PER 15 MIN (STAT)

## 2024-03-15 PROCEDURE — 82040 ASSAY OF SERUM ALBUMIN: CPT | Performed by: PHYSICIAN ASSISTANT

## 2024-03-15 PROCEDURE — 63600175 PHARM REV CODE 636 W HCPCS: Performed by: STUDENT IN AN ORGANIZED HEALTH CARE EDUCATION/TRAINING PROGRAM

## 2024-03-15 PROCEDURE — 86140 C-REACTIVE PROTEIN: CPT | Performed by: PHYSICIAN ASSISTANT

## 2024-03-15 PROCEDURE — 11000004 HC SNF PRIVATE

## 2024-03-15 RX ORDER — INSULIN ASPART 100 [IU]/ML
10 INJECTION, SOLUTION INTRAVENOUS; SUBCUTANEOUS
Status: DISCONTINUED | OUTPATIENT
Start: 2024-03-15 | End: 2024-03-23

## 2024-03-15 RX ADMIN — SITAGLIPTIN 100 MG: 50 TABLET, FILM COATED ORAL at 09:03

## 2024-03-15 RX ADMIN — INSULIN DETEMIR 20 UNITS: 100 INJECTION, SOLUTION SUBCUTANEOUS at 09:03

## 2024-03-15 RX ADMIN — ACETAMINOPHEN 650 MG: 325 TABLET ORAL at 09:03

## 2024-03-15 RX ADMIN — CETIRIZINE HYDROCHLORIDE 10 MG: 10 TABLET, FILM COATED ORAL at 09:03

## 2024-03-15 RX ADMIN — SODIUM CHLORIDE, PRESERVATIVE FREE 10 ML: 5 INJECTION INTRAVENOUS at 06:03

## 2024-03-15 RX ADMIN — INSULIN DETEMIR 15 UNITS: 100 INJECTION, SOLUTION SUBCUTANEOUS at 10:03

## 2024-03-15 RX ADMIN — TOBRAMYCIN SULFATE 400 MG: 40 INJECTION, SOLUTION INTRAMUSCULAR; INTRAVENOUS at 09:03

## 2024-03-15 RX ADMIN — SODIUM CHLORIDE: 9 INJECTION, SOLUTION INTRAVENOUS at 09:03

## 2024-03-15 RX ADMIN — INSULIN ASPART 10 UNITS: 100 INJECTION, SOLUTION INTRAVENOUS; SUBCUTANEOUS at 04:03

## 2024-03-15 RX ADMIN — LINEZOLID 600 MG: 600 TABLET, FILM COATED ORAL at 09:03

## 2024-03-15 RX ADMIN — SODIUM CHLORIDE, PRESERVATIVE FREE 10 ML: 5 INJECTION INTRAVENOUS at 09:03

## 2024-03-15 RX ADMIN — SODIUM CHLORIDE, PRESERVATIVE FREE 10 ML: 5 INJECTION INTRAVENOUS at 11:03

## 2024-03-15 RX ADMIN — MICONAZOLE NITRATE: 20 CREAM TOPICAL at 09:03

## 2024-03-15 RX ADMIN — INSULIN ASPART 10 UNITS: 100 INJECTION, SOLUTION INTRAVENOUS; SUBCUTANEOUS at 11:03

## 2024-03-15 RX ADMIN — Medication 1 CAPSULE: at 04:03

## 2024-03-15 RX ADMIN — COLLAGENASE SANTYL: 250 OINTMENT TOPICAL at 09:03

## 2024-03-15 RX ADMIN — FERROUS SULFATE TAB 325 MG (65 MG ELEMENTAL FE) 1 EACH: 325 (65 FE) TAB at 09:03

## 2024-03-15 RX ADMIN — Medication 1 CAPSULE: at 09:03

## 2024-03-15 RX ADMIN — SODIUM CHLORIDE, PRESERVATIVE FREE 10 ML: 5 INJECTION INTRAVENOUS at 12:03

## 2024-03-15 RX ADMIN — OXYCODONE HYDROCHLORIDE AND ACETAMINOPHEN 500 MG: 500 TABLET ORAL at 09:03

## 2024-03-15 RX ADMIN — HYDROCODONE BITARTRATE AND ACETAMINOPHEN 1 TABLET: 7.5; 325 TABLET ORAL at 04:03

## 2024-03-15 RX ADMIN — PANTOPRAZOLE SODIUM 40 MG: 40 TABLET, DELAYED RELEASE ORAL at 09:03

## 2024-03-15 RX ADMIN — Medication 1 CAPSULE: at 11:03

## 2024-03-15 NOTE — PLAN OF CARE
Problem: Adult Inpatient Plan of Care  Goal: Plan of Care Review  Outcome: Ongoing, Progressing  Flowsheets (Taken 3/15/2024 0041)  Plan of Care Reviewed With: patient  Goal: Patient-Specific Goal (Individualized)  Outcome: Ongoing, Progressing  Flowsheets (Taken 3/15/2024 0041)  Anxieties, Fears or Concerns: none at this time  Individualized Care Needs: Wound care, Maintain use of wound vac, Monitor CBGs and administer supplemental insulin as ordered, Pain Management  Goal: Absence of Hospital-Acquired Illness or Injury  Outcome: Ongoing, Progressing  Intervention: Prevent and Manage VTE (Venous Thromboembolism) Risk  Flowsheets (Taken 3/14/2024 2000)  VTE Prevention/Management:   bleeding precations maintained   bleeding risk assessed   dorsiflexion/plantar flexion performed   ROM (passive) performed  Goal: Optimal Comfort and Wellbeing  Outcome: Ongoing, Progressing     Problem: Diabetes Comorbidity  Goal: Blood Glucose Level Within Targeted Range  Outcome: Ongoing, Progressing

## 2024-03-15 NOTE — PROGRESS NOTES
[facility]  [facility] MEDICINE ~ PROGRESS NOTE  CHIEF COMPLAINT   Hospital follow up for nonhealing wound    HOSPITAL COURSE     58-year-old man with quadriplegic spinal paralysis and numerous decubitus ulcers who is followed by wound care is brought in today due to fever. He had wound cultures done 3 days ago that have grown Proteus mirabilis and Klebsiella pneumoniae. Sensitivities are not complete. The wounds were noted to have significant odor and heavy green drainage. In addition the patient had some nausea and vomiting and home health noted that his blood pressure was dipping. He was advised to come to the emergency room. Wound cultures were drawn from the right hip wound. His wife reports that when the wound is pressed above pus comes out. Both Klebsiella and Proteus maybe treated with fluoroquinolones which are both IV and oral. The patient's wife preferred if the patient came home so that she could attend to his wounds however the patient expressed a desire to be admitted.     Today:  Due to most recent glucose readings, we will decrease current insulin regimen.  Decreasing both detemir and aspart.  Wound care changed wound VAC yesterday.  Reports both sacral and right buttocks ulcerations or decreasing in size and have increased granulation tissue the each dressing change.    OBJECTIVE/PHYSICAL EXAM     VITAL SIGNS (MOST RECENT):  Temp: 99.5 °F (37.5 °C) (03/15/24 0718)  Pulse: (!) 132 (03/15/24 1118)  Resp: 18 (03/15/24 0829)  BP: 125/87 (03/15/24 1118)  SpO2: 99 % (03/15/24 0829) VITAL SIGNS (24 HOUR RANGE):  Temp:  [98.4 °F (36.9 °C)-99.5 °F (37.5 °C)] 99.5 °F (37.5 °C)  Pulse:  [129-138] 132  Resp:  [18-19] 18  SpO2:  [98 %-99 %] 99 %  BP: (113-149)/() 125/87     GENERAL: In no acute distress, afebrile  HEENT:  PERRLA  CHEST: Clear to auscultation bilaterally  HEART: S1, S2, no appreciable murmur  ABDOMEN: Soft, nontender, BS +  MSK: Warm, no lower extremity edema,  no clubbing or cyanosis  NEUROLOGIC: Alert and oriented x4  INTEGUMENTARY:  See media for wounds    ASSESSMENT/PLAN     Nonhealing chronic wound   VRE, Proteus, Pseudomonas, Klebsiella  S/p debridement with Dr. Jean on 02/27/2024  Zyvox and Tobramycin - will need 6 weeks of treatment given exposed bone 03/02/2024-04/12/2024  Wound VAC and wound care  Pre albumin <3  Obtaining transferrin, CRP, and albumin level today     Post op anemia   H&H improved s/p 2u pRBC 02/28/2024 and 1u pRBC on 02/29/2024  No anticoagulation at this time - aspirin d/c 02/28/2024     Hyperglycemia in setting of DM  A1c 02/23/2024 8.5%  Reports taking 80u Levemir BID  Continue home Sitagliptin  Currently receiving detemir 15 units b.i.d. and aspart 10 units TID with meals  Decreased insulin regimen today     Hypotension - resolved   Hypertension  Hypertensive episodes are associated with pain, when pain is controlled, BP is normal      GISSEL  Hyperkalemia - suspect false elevation  Renal function stable      Hx of: Neurogenic bladder, Quadriplegia     DVT prophylaxis:  SCDs, history of bleeding in the recent past    Anticipated discharge and disposition:  Remain admitted for completion of IV antibiotics as he is on tobramycin and requires troughs to be drawn.  Continue wound care.  __________________________________________________________________________    LABS/MICRO/MEDS/DIAGNOSTICS     LABS  Recent Labs     03/14/24  0511      K 4.6   CHLORIDE 111*   CO2 20*   BUN 30.6*   CREATININE 0.98   GLUCOSE 117*   CALCIUM 8.6     Recent Labs     03/13/24  0556   WBC 7.57   RBC 3.64*   HCT 31.8*   MCV 87.4        MICROBIOLOGY  Microbiology Results (last 7 days)       ** No results found for the last 168 hours. **             MEDICATIONS   acetaminophen  650 mg Oral QHS    ascorbic acid (vitamin C)  500 mg Oral Daily    cetirizine  10 mg Oral Daily    collagenase   Topical (Top) Daily    ferrous sulfate  1 tablet Oral Daily     insulin aspart U-100  10 Units Subcutaneous TIDWM    insulin detemir U-100  15 Units Subcutaneous BID    Lactobacillus acidophilus  1 capsule Oral TID WM    linezolid  600 mg Oral Q12H    miconazole   Topical (Top) BID    pantoprazole  40 mg Oral Daily    SITagliptin phosphate  100 mg Oral Daily    sodium chloride 0.9%  10 mL Intravenous Q6H    tobramycin IV (PEDS and ADULTS)  400 mg Intravenous Q48H         INFUSIONS           DIAGNOSTIC TESTS  X-Ray Chest 1 View   Final Result      Left PICC tip overlies the mid SVC.         Electronically signed by: Oj Solomon   Date:    02/28/2024   Time:    14:29           EF   Date Value Ref Range Status   05/09/2023 60 % Final   07/18/2022 40 % Final        NUTRITION STATUS  Patient meets ASPEN criteria for   malnutrition of   per RD assessment as evidenced by:                       A minimum of two characteristics is recommended for diagnosis of either severe or non-severe malnutrition.     Case related differential diagnoses have been reviewed; assessment and plan has been documented. I have personally reviewed the labs and test results that are currently available; I have reviewed the patients medication list. I have reviewed the consulting providers recommendations. I have reviewed or attempted to review medical records based upon their availability.  All of the patient's and/or family's questions have been addressed and answered to the best of my ability.  I will continue to monitor closely and make adjustments to medical management as needed.  This document was created using M*Modal Fluency Direct.  Transcription errors may have been made.  Please contact me if any questions may rise regarding documentation to clarify transcription.      ANTHONY Vazquez   Internal Medicine  Department of Hospital Medicine [facility]

## 2024-03-15 NOTE — PT/OT/SLP PROGRESS
Physical Therapy Treatment Note           Name: Antonio Galvan II    : 1965 (58 y.o.)  MRN: [id number]           TREATMENT SUMMARY AND RECOMMENDATIONS:    PT Received On: 03/15/24  PT Start Time: 1400     PT Stop Time: 1430  PT Total Time (min): 30 min     Subjective Assessment:   No complaints  Lethargic   x Awake, alert, cooperative  Uncooperative    Agitated  c/o pain    Appropriate  c/o fatigue    Confused x Treated at bedside     Emotionally labile  Treated in gym/dept.    Impulsive  Other:    Flat affect       Therapy Precautions:    Cognitive deficits  Spinal precautions    Collar - hard  Sternal precautions    Collar - soft   TLSO    Fall risk  LSO    Hip precautions - posterior  Knee immobilizer    Hip precautions - anterior  WBAT    Impaired communication  Partial weightbearing    Oxygen  TTWB    PEG tube  NWB    Visual deficits x Other: Wound Vac and Super pubic cath    Hearing deficits  x OK 'd to get in chair 2-3 hours per day       Treatment Objectives:     Mobility Training:   Assist level Comments    Bed mobility Total A Rolling side to side for tess placement    Transfer Total A Tess from bed>Chair; x 3 person assistance for proper positioning in chair    Gait     Sit to stand transitions     Sitting balance     Standing balance      Wheelchair mobility     Car transfer     Other:          Therapeutic Exercise:   Exercise Sets Reps Comments                               Additional Comments:  Extra time needed for proper positioning in WC once OOB. Wound care stating wounds look good but do recommend tess vs slide board to reduce sheering.     Assessment: Patient tolerated session well - up in chair and outside.     PT Plan: Continue per POC  Revisions made to plan of care: No    GOALS:   Multidisciplinary Problems       Physical Therapy Goals          Problem: Physical Therapy    Goal Priority Disciplines Outcome Goal Variances Interventions   Physical Therapy Goal     PT,  PT/OT Ongoing, Progressing     Description: Goals to be met by: Discharge     Pt to be seen for ROM tasks QD to ensure proper positioning can be obtained to overall reduce impacts of prolonged bed rest and skin break down  Progress pending healing stages of current wounds    Patient will increase functional independence with mobility by performin. Pt to tolerate PROM tasks to B UE and LE, while obtaining 25% improvement in range.   2. Sitting EOB to tolerance pending wound healing - with pt demonstrating normal BP  3. Progress to OOB into chair once able. - Met  4. Pt to tolerate up to 3 hours in power chair                         Skilled PT Minutes Provided: 25   Communication with Treatment Team:     Equipment recommendations:       At end of treatment, patient remained:   Up in chair    x Up in wheelchair in room    In bed    With alarm activated    Bed rails up    Call bell in reach    x Family/friends present    Restraints secured properly    In bathroom with CNA/RN notified   x Nurse aware    In gym with therapist/tech    Other:

## 2024-03-15 NOTE — PLAN OF CARE
Problem: Adult Inpatient Plan of Care  Goal: Plan of Care Review  Outcome: Ongoing, Progressing  Flowsheets (Taken 3/15/2024 1353)  Plan of Care Reviewed With: patient  Goal: Patient-Specific Goal (Individualized)  Outcome: Ongoing, Progressing  Flowsheets (Taken 3/15/2024 1353)  Anxieties, Fears or Concerns: none voiced  Individualized Care Needs: wound care, monitor wound vac and output, monitor CBGs, pain management based on BP, modify insulin dosage, monitor prealbumin and albumin  Goal: Absence of Hospital-Acquired Illness or Injury  Outcome: Ongoing, Progressing  Intervention: Identify and Manage Fall Risk  Flowsheets (Taken 3/15/2024 1353)  Safety Promotion/Fall Prevention:   assistive device/personal item within reach   bed alarm set   nonskid shoes/socks when out of bed   instructed to call staff for mobility   family to remain at bedside   room near unit station  Intervention: Prevent Skin Injury  Flowsheets (Taken 3/15/2024 1353)  Body Position: sitting up in bed  Skin Protection:   adhesive use limited   incontinence pads utilized   transparent dressing maintained   tubing/devices free from skin contact   skin sealant/moisture barrier applied  Intervention: Prevent and Manage VTE (Venous Thromboembolism) Risk  Flowsheets (Taken 3/15/2024 1353)  Activity Management: Rolling - L1  VTE Prevention/Management:   bleeding precations maintained   bleeding risk assessed   fluids promoted  Range of Motion: ROM (range of motion) performed  Intervention: Prevent Infection  Flowsheets (Taken 3/15/2024 1353)  Infection Prevention:   cohorting utilized   equipment surfaces disinfected   hand hygiene promoted  Goal: Optimal Comfort and Wellbeing  Outcome: Ongoing, Progressing  Intervention: Monitor Pain and Promote Comfort  Flowsheets (Taken 3/15/2024 1353)  Pain Management Interventions:   care clustered   relaxation techniques promoted   quiet environment facilitated   pain management plan reviewed with  patient/caregiver  Intervention: Provide Person-Centered Care  Flowsheets (Taken 3/15/2024 1353)  Trust Relationship/Rapport:   care explained   choices provided   questions encouraged  Goal: Readiness for Transition of Care  Outcome: Ongoing, Progressing  Intervention: Mutually Develop Transition Plan  Flowsheets (Taken 3/15/2024 1353)  Communicated SADE with patient/caregiver: Date not available/Unable to determine     Problem: Diabetes Comorbidity  Goal: Blood Glucose Level Within Targeted Range  Outcome: Ongoing, Progressing  Intervention: Monitor and Manage Glycemia  Flowsheets (Taken 3/15/2024 1353)  Glycemic Management:   blood glucose monitored   supplemental insulin given   oral hydration promoted     Problem: Skin Injury Risk Increased  Goal: Skin Health and Integrity  Outcome: Ongoing, Progressing  Intervention: Optimize Skin Protection  Flowsheets (Taken 3/15/2024 1353)  Pressure Reduction Techniques:   frequent weight shift encouraged   weight shift assistance provided   heels elevated off bed   positioned off wounds   pressure points protected   sit time limited to 2 hours  Pressure Reduction Devices:   heel offloading device utilized   positioning supports utilized   pressure-redistributing mattress utilized   foam padding utilized   specialty bed utilized  Skin Protection:   adhesive use limited   incontinence pads utilized   transparent dressing maintained   tubing/devices free from skin contact   skin sealant/moisture barrier applied  Head of Bed (HOB) Positioning: HOB at 20-30 degrees  Intervention: Promote and Optimize Oral Intake  Flowsheets (Taken 3/15/2024 1353)  Oral Nutrition Promotion:   calorie-dense foods provided   calorie-dense liquids provided   safe use of adaptive equipment encouraged     Problem: Impaired Wound Healing  Goal: Optimal Wound Healing  Outcome: Ongoing, Progressing  Intervention: Promote Wound Healing  Flowsheets (Taken 3/15/2024 1353)  Oral Nutrition Promotion:    calorie-dense foods provided   calorie-dense liquids provided   safe use of adaptive equipment encouraged  Sleep/Rest Enhancement:   awakenings minimized   regular sleep/rest pattern promoted   relaxation techniques promoted  Activity Management: Rolling - L1  Pain Management Interventions:   care clustered   relaxation techniques promoted   quiet environment facilitated   pain management plan reviewed with patient/caregiver     Problem: Fall Injury Risk  Goal: Absence of Fall and Fall-Related Injury  Outcome: Ongoing, Progressing  Intervention: Identify and Manage Contributors  Flowsheets (Taken 3/15/2024 1353)  Self-Care Promotion:   independence encouraged   BADL personal objects within reach  Medication Review/Management: medications reviewed  Intervention: Promote Injury-Free Environment  Flowsheets (Taken 3/15/2024 1353)  Safety Promotion/Fall Prevention:   assistive device/personal item within reach   bed alarm set   nonskid shoes/socks when out of bed   instructed to call staff for mobility   family to remain at bedside   room near unit station     Problem: Infection  Goal: Absence of Infection Signs and Symptoms  Outcome: Ongoing, Progressing  Intervention: Prevent or Manage Infection  Flowsheets (Taken 3/15/2024 1353)  Fever Reduction/Comfort Measures:   lightweight bedding   lightweight clothing  Infection Management: aseptic technique maintained  Isolation Precautions:   contact   precautions maintained

## 2024-03-16 LAB
ANION GAP SERPL CALC-SCNC: 10 MEQ/L
BASOPHILS # BLD AUTO: 0.03 X10(3)/MCL
BASOPHILS NFR BLD AUTO: 0.5 %
BUN SERPL-MCNC: 33.9 MG/DL (ref 8.4–25.7)
CALCIUM SERPL-MCNC: 8.9 MG/DL (ref 8.4–10.2)
CHLORIDE SERPL-SCNC: 113 MMOL/L (ref 98–107)
CO2 SERPL-SCNC: 19 MMOL/L (ref 22–29)
CREAT SERPL-MCNC: 1.11 MG/DL (ref 0.73–1.18)
CREAT/UREA NIT SERPL: 31
EOSINOPHIL # BLD AUTO: 0.36 X10(3)/MCL (ref 0–0.9)
EOSINOPHIL NFR BLD AUTO: 5.7 %
ERYTHROCYTE [DISTWIDTH] IN BLOOD BY AUTOMATED COUNT: 17 % (ref 11.5–17)
GFR SERPLBLD CREATININE-BSD FMLA CKD-EPI: >60 MLS/MIN/1.73/M2
GLUCOSE SERPL-MCNC: 140 MG/DL (ref 74–100)
GLUCOSE SERPL-MCNC: 147 MG/DL (ref 70–110)
GLUCOSE SERPL-MCNC: 148 MG/DL (ref 70–110)
GLUCOSE SERPL-MCNC: 171 MG/DL (ref 70–110)
GLUCOSE SERPL-MCNC: 174 MG/DL (ref 70–110)
HCT VFR BLD AUTO: 33 % (ref 42–52)
HGB BLD-MCNC: 10.2 G/DL (ref 14–18)
IMM GRANULOCYTES # BLD AUTO: 0.01 X10(3)/MCL (ref 0–0.04)
IMM GRANULOCYTES NFR BLD AUTO: 0.2 %
LYMPHOCYTES # BLD AUTO: 1.69 X10(3)/MCL (ref 0.6–4.6)
LYMPHOCYTES NFR BLD AUTO: 26.6 %
MAGNESIUM SERPL-MCNC: 1.8 MG/DL (ref 1.6–2.6)
MCH RBC QN AUTO: 27 PG (ref 27–31)
MCHC RBC AUTO-ENTMCNC: 30.9 G/DL (ref 33–36)
MCV RBC AUTO: 87.3 FL (ref 80–94)
MONOCYTES # BLD AUTO: 0.23 X10(3)/MCL (ref 0.1–1.3)
MONOCYTES NFR BLD AUTO: 3.6 %
NEUTROPHILS # BLD AUTO: 4.03 X10(3)/MCL (ref 2.1–9.2)
NEUTROPHILS NFR BLD AUTO: 63.4 %
PLATELET # BLD AUTO: 115 X10(3)/MCL (ref 130–400)
PMV BLD AUTO: 10.8 FL (ref 7.4–10.4)
POTASSIUM SERPL-SCNC: 4.7 MMOL/L (ref 3.5–5.1)
RBC # BLD AUTO: 3.78 X10(6)/MCL (ref 4.7–6.1)
SODIUM SERPL-SCNC: 142 MMOL/L (ref 136–145)
WBC # SPEC AUTO: 6.35 X10(3)/MCL (ref 4.5–11.5)

## 2024-03-16 PROCEDURE — 80048 BASIC METABOLIC PNL TOTAL CA: CPT | Performed by: STUDENT IN AN ORGANIZED HEALTH CARE EDUCATION/TRAINING PROGRAM

## 2024-03-16 PROCEDURE — 85025 COMPLETE CBC W/AUTO DIFF WBC: CPT | Performed by: STUDENT IN AN ORGANIZED HEALTH CARE EDUCATION/TRAINING PROGRAM

## 2024-03-16 PROCEDURE — 27000207 HC ISOLATION

## 2024-03-16 PROCEDURE — 63600175 PHARM REV CODE 636 W HCPCS: Performed by: PHYSICIAN ASSISTANT

## 2024-03-16 PROCEDURE — 11000004 HC SNF PRIVATE

## 2024-03-16 PROCEDURE — 94760 N-INVAS EAR/PLS OXIMETRY 1: CPT

## 2024-03-16 PROCEDURE — 25000003 PHARM REV CODE 250: Performed by: PHYSICIAN ASSISTANT

## 2024-03-16 PROCEDURE — 83735 ASSAY OF MAGNESIUM: CPT | Performed by: STUDENT IN AN ORGANIZED HEALTH CARE EDUCATION/TRAINING PROGRAM

## 2024-03-16 PROCEDURE — 25000003 PHARM REV CODE 250: Performed by: INTERNAL MEDICINE

## 2024-03-16 PROCEDURE — A4216 STERILE WATER/SALINE, 10 ML: HCPCS | Performed by: INTERNAL MEDICINE

## 2024-03-16 PROCEDURE — 99900035 HC TECH TIME PER 15 MIN (STAT)

## 2024-03-16 PROCEDURE — 25000003 PHARM REV CODE 250: Performed by: STUDENT IN AN ORGANIZED HEALTH CARE EDUCATION/TRAINING PROGRAM

## 2024-03-16 PROCEDURE — 63600175 PHARM REV CODE 636 W HCPCS: Performed by: STUDENT IN AN ORGANIZED HEALTH CARE EDUCATION/TRAINING PROGRAM

## 2024-03-16 RX ORDER — MAGNESIUM SULFATE HEPTAHYDRATE 40 MG/ML
2 INJECTION, SOLUTION INTRAVENOUS ONCE
Status: COMPLETED | OUTPATIENT
Start: 2024-03-16 | End: 2024-03-16

## 2024-03-16 RX ORDER — LOPERAMIDE HYDROCHLORIDE 2 MG/1
4 CAPSULE ORAL DAILY
Status: DISCONTINUED | OUTPATIENT
Start: 2024-03-17 | End: 2024-04-09 | Stop reason: HOSPADM

## 2024-03-16 RX ORDER — LOPERAMIDE HYDROCHLORIDE 2 MG/1
2 CAPSULE ORAL DAILY
Status: DISCONTINUED | OUTPATIENT
Start: 2024-03-16 | End: 2024-03-16

## 2024-03-16 RX ORDER — METOPROLOL TARTRATE 25 MG/1
25 TABLET, FILM COATED ORAL 2 TIMES DAILY
Status: DISCONTINUED | OUTPATIENT
Start: 2024-03-16 | End: 2024-03-16

## 2024-03-16 RX ORDER — LOPERAMIDE HYDROCHLORIDE 2 MG/1
2 CAPSULE ORAL ONCE
Status: COMPLETED | OUTPATIENT
Start: 2024-03-16 | End: 2024-03-16

## 2024-03-16 RX ORDER — DILTIAZEM HYDROCHLORIDE 30 MG/1
30 TABLET, FILM COATED ORAL EVERY 6 HOURS
Status: DISCONTINUED | OUTPATIENT
Start: 2024-03-16 | End: 2024-03-20

## 2024-03-16 RX ADMIN — LOPERAMIDE HYDROCHLORIDE 2 MG: 2 CAPSULE ORAL at 02:03

## 2024-03-16 RX ADMIN — FERROUS SULFATE TAB 325 MG (65 MG ELEMENTAL FE) 1 EACH: 325 (65 FE) TAB at 08:03

## 2024-03-16 RX ADMIN — SODIUM CHLORIDE, PRESERVATIVE FREE 10 ML: 5 INJECTION INTRAVENOUS at 06:03

## 2024-03-16 RX ADMIN — ACETAMINOPHEN 650 MG: 325 TABLET ORAL at 09:03

## 2024-03-16 RX ADMIN — INSULIN ASPART 10 UNITS: 100 INJECTION, SOLUTION INTRAVENOUS; SUBCUTANEOUS at 11:03

## 2024-03-16 RX ADMIN — SITAGLIPTIN 100 MG: 50 TABLET, FILM COATED ORAL at 08:03

## 2024-03-16 RX ADMIN — LOPERAMIDE HYDROCHLORIDE 2 MG: 2 CAPSULE ORAL at 04:03

## 2024-03-16 RX ADMIN — MAGNESIUM SULFATE HEPTAHYDRATE 2 G: 40 INJECTION, SOLUTION INTRAVENOUS at 11:03

## 2024-03-16 RX ADMIN — CETIRIZINE HYDROCHLORIDE 10 MG: 10 TABLET, FILM COATED ORAL at 08:03

## 2024-03-16 RX ADMIN — LINEZOLID 600 MG: 600 TABLET, FILM COATED ORAL at 09:03

## 2024-03-16 RX ADMIN — INSULIN ASPART 10 UNITS: 100 INJECTION, SOLUTION INTRAVENOUS; SUBCUTANEOUS at 04:03

## 2024-03-16 RX ADMIN — SODIUM CHLORIDE, PRESERVATIVE FREE 10 ML: 5 INJECTION INTRAVENOUS at 12:03

## 2024-03-16 RX ADMIN — Medication 1 CAPSULE: at 08:03

## 2024-03-16 RX ADMIN — DILTIAZEM HYDROCHLORIDE 30 MG: 30 TABLET, FILM COATED ORAL at 06:03

## 2024-03-16 RX ADMIN — DILTIAZEM HYDROCHLORIDE 30 MG: 30 TABLET, FILM COATED ORAL at 11:03

## 2024-03-16 RX ADMIN — OXYCODONE HYDROCHLORIDE AND ACETAMINOPHEN 500 MG: 500 TABLET ORAL at 09:03

## 2024-03-16 RX ADMIN — Medication 1 CAPSULE: at 04:03

## 2024-03-16 RX ADMIN — SODIUM CHLORIDE, PRESERVATIVE FREE 10 ML: 5 INJECTION INTRAVENOUS at 11:03

## 2024-03-16 RX ADMIN — INSULIN ASPART 10 UNITS: 100 INJECTION, SOLUTION INTRAVENOUS; SUBCUTANEOUS at 07:03

## 2024-03-16 RX ADMIN — HYDROCODONE BITARTRATE AND ACETAMINOPHEN 1 TABLET: 7.5; 325 TABLET ORAL at 01:03

## 2024-03-16 RX ADMIN — INSULIN DETEMIR 15 UNITS: 100 INJECTION, SOLUTION SUBCUTANEOUS at 09:03

## 2024-03-16 RX ADMIN — PANTOPRAZOLE SODIUM 40 MG: 40 TABLET, DELAYED RELEASE ORAL at 08:03

## 2024-03-16 RX ADMIN — MICONAZOLE NITRATE: 20 CREAM TOPICAL at 08:03

## 2024-03-16 RX ADMIN — Medication 1 CAPSULE: at 11:03

## 2024-03-16 RX ADMIN — INSULIN DETEMIR 15 UNITS: 100 INJECTION, SOLUTION SUBCUTANEOUS at 08:03

## 2024-03-16 RX ADMIN — LINEZOLID 600 MG: 600 TABLET, FILM COATED ORAL at 08:03

## 2024-03-16 RX ADMIN — COLLAGENASE SANTYL: 250 OINTMENT TOPICAL at 08:03

## 2024-03-16 RX ADMIN — Medication 10 ML: at 06:03

## 2024-03-16 NOTE — PLAN OF CARE
Problem: Adult Inpatient Plan of Care  Goal: Plan of Care Review  Outcome: Ongoing, Progressing  Goal: Patient-Specific Goal (Individualized)  Outcome: Ongoing, Progressing  Flowsheets (Taken 3/15/2024 2000)  Anxieties, Fears or Concerns: none verbalized  Individualized Care Needs: wound care, monitor wound vac and drainage, monitor CBGs, pain management based on BP, monitor labs  Goal: Absence of Hospital-Acquired Illness or Injury  Outcome: Ongoing, Progressing  Intervention: Prevent and Manage VTE (Venous Thromboembolism) Risk  Flowsheets (Taken 3/15/2024 2000)  VTE Prevention/Management:   bleeding precations maintained   bleeding risk assessed   fluids promoted  Range of Motion: ROM (range of motion) performed  Intervention: Prevent Infection  Flowsheets (Taken 3/15/2024 2000)  Infection Prevention:   cohorting utilized   environmental surveillance performed   hand hygiene promoted   equipment surfaces disinfected   personal protective equipment utilized   single patient room provided   rest/sleep promoted     Problem: Impaired Wound Healing  Goal: Optimal Wound Healing  Outcome: Ongoing, Progressing  Intervention: Promote Wound Healing  Flowsheets (Taken 3/15/2024 2000)  Pain Management Interventions: medication offered

## 2024-03-16 NOTE — PLAN OF CARE
Problem: Adult Inpatient Plan of Care  Goal: Plan of Care Review  Outcome: Ongoing, Progressing  Goal: Patient-Specific Goal (Individualized)  Outcome: Ongoing, Progressing  Goal: Absence of Hospital-Acquired Illness or Injury  Outcome: Ongoing, Progressing  Intervention: Identify and Manage Fall Risk  Flowsheets (Taken 3/16/2024 0820)  Safety Promotion/Fall Prevention:   assistive device/personal item within reach   bed alarm set   Fall Risk reviewed with patient/family   Fall Risk signage in place   family to remain at bedside  Intervention: Prevent Skin Injury  Flowsheets (Taken 3/16/2024 0820)  Body Position:   side-lying   head facing, right   30 degrees   legs elevated   heels elevated   upper extremity elevated  Skin Protection:   adhesive use limited   incontinence pads utilized   skin sealant/moisture barrier applied   transparent dressing maintained   tubing/devices free from skin contact  Intervention: Prevent and Manage VTE (Venous Thromboembolism) Risk  Flowsheets (Taken 3/16/2024 0820)  Activity Management: Patient unable to perform activities  VTE Prevention/Management:   bleeding precations maintained   bleeding risk assessed   fluids promoted   ROM (active) performed  Range of Motion: active ROM (range of motion) encouraged  Intervention: Prevent Infection  Flowsheets (Taken 3/16/2024 0820)  Infection Prevention:   environmental surveillance performed   hand hygiene promoted   personal protective equipment utilized   rest/sleep promoted   single patient room provided  Goal: Optimal Comfort and Wellbeing  Outcome: Ongoing, Progressing  Intervention: Monitor Pain and Promote Comfort  Flowsheets (Taken 3/16/2024 0820)  Pain Management Interventions:   care clustered   medication offered   pain management plan reviewed with patient/caregiver   pillow support provided   position adjusted   quiet environment facilitated   relaxation techniques promoted  Intervention: Provide Person-Centered  Care  Flowsheets (Taken 3/16/2024 0820)  Trust Relationship/Rapport:   care explained   choices provided   emotional support provided   empathic listening provided   questions answered   questions encouraged   reassurance provided   thoughts/feelings acknowledged  Goal: Readiness for Transition of Care  Outcome: Ongoing, Progressing  Intervention: Mutually Develop Transition Plan  Flowsheets (Taken 3/16/2024 0820)  Equipment Currently Used at Home:   hospital bed   lift device   power chair  Transportation Anticipated: family or friend will provide  Who are your caregiver(s) and their phone number(s)?: Nicol Lujan) [phone]  Communicated SADE with patient/caregiver: Date not available/Unable to determine  Do you expect to return to your current living situation?: Yes  Do you have help at home or someone to help you manage your care at home?: Yes  Readmission within 30 days?: No  Do you currently have service(s) that help you manage your care at home?: No  Is the pt/caregiver preference to resume services with current agency: No     Problem: Diabetes Comorbidity  Goal: Blood Glucose Level Within Targeted Range  Outcome: Ongoing, Progressing  Intervention: Monitor and Manage Glycemia  Flowsheets (Taken 3/16/2024 0820)  Glycemic Management:   blood glucose monitored   oral hydration promoted   supplemental insulin given     Problem: Skin Injury Risk Increased  Goal: Skin Health and Integrity  Outcome: Ongoing, Progressing  Intervention: Optimize Skin Protection  Flowsheets (Taken 3/16/2024 0820)  Pressure Reduction Techniques:   frequent weight shift encouraged   heels elevated off bed   rest period provided between sit times   weight shift assistance provided  Pressure Reduction Devices:   heel offloading device utilized   positioning supports utilized   specialty bed utilized  Skin Protection:   adhesive use limited   incontinence pads utilized   skin sealant/moisture barrier applied   transparent dressing  maintained   tubing/devices free from skin contact  Head of Bed (HOB) Positioning: HOB at 20-30 degrees  Intervention: Promote and Optimize Oral Intake  Flowsheets (Taken 3/16/2024 0820)  Oral Nutrition Promotion:   calorie-dense foods provided   calorie-dense liquids provided     Problem: Impaired Wound Healing  Goal: Optimal Wound Healing  Outcome: Ongoing, Progressing  Intervention: Promote Wound Healing  Flowsheets (Taken 3/16/2024 0820)  Oral Nutrition Promotion:   calorie-dense foods provided   calorie-dense liquids provided  Sleep/Rest Enhancement:   family presence promoted   consistent schedule promoted   natural light exposure provided   noise level reduced   awakenings minimized   relaxation techniques promoted  Activity Management: Patient unable to perform activities  Pain Management Interventions:   care clustered   medication offered   pain management plan reviewed with patient/caregiver   pillow support provided   position adjusted   quiet environment facilitated   relaxation techniques promoted     Problem: Fall Injury Risk  Goal: Absence of Fall and Fall-Related Injury  Outcome: Ongoing, Progressing  Intervention: Identify and Manage Contributors  Flowsheets (Taken 3/16/2024 0820)  Self-Care Promotion:   independence encouraged   BADL personal objects within reach   BADL personal routines maintained   meal set-up provided  Medication Review/Management: medications reviewed  Intervention: Promote Injury-Free Environment  Flowsheets (Taken 3/16/2024 0820)  Safety Promotion/Fall Prevention:   assistive device/personal item within reach   bed alarm set   Fall Risk reviewed with patient/family   Fall Risk signage in place   family to remain at bedside     Problem: Infection  Goal: Absence of Infection Signs and Symptoms  Outcome: Ongoing, Progressing  Intervention: Prevent or Manage Infection  Flowsheets (Taken 3/16/2024 0820)  Fever Reduction/Comfort Measures:   lightweight bedding   lightweight  clothing  Infection Management: aseptic technique maintained  Isolation Precautions:   contact   protective   precautions maintained

## 2024-03-16 NOTE — PLAN OF CARE
[facility]  Discharge Reassessment    Primary Care Provider: Jennifer Fernando NP    Expected Discharge Date:     Reassessment (most recent)       Discharge Reassessment - 03/16/24 1426          Discharge Reassessment    Assessment Type Discharge Planning Reassessment     Did the patient's condition or plan change since previous assessment? No     Discharge Plan discussed with: Friend;Patient;Parent(s)     Name(s) and Number(s) Judy mother      Communicated SADE with patient/caregiver Date not available/Unable to determine     Discharge Plan A Home with family;Home Health     DME Needed Upon Discharge  none     Transition of Care Barriers None     Why the patient remains in the hospital Requires continued medical care        Post-Acute Status    Post-Acute Authorization Home Health     Home Health Status Pending medical clearance/testing     Hospital Resources/Appts/Education Provided Provided patient/caregiver with written discharge plan information     Discharge Delays None known at this time

## 2024-03-16 NOTE — PROGRESS NOTES
[facility]  [facility] [place] MEDICINE ~ PROGRESS NOTE  CHIEF COMPLAINT   Hospital follow up for nonhealing wound    HOSPITAL COURSE   58-year-old man with quadriplegic spinal paralysis and numerous decubitus ulcers who is followed by wound care is brought in today due to fever. He had wound cultures done 3 days ago that have grown Proteus mirabilis and Klebsiella pneumoniae. Sensitivities are not complete. The wounds were noted to have significant odor and heavy green drainage. In addition the patient had some nausea and vomiting and home health noted that his blood pressure was dipping. He was advised to come to the emergency room. Wound cultures were drawn from the right hip wound. His wife reports that when the wound is pressed above pus comes out. Both Klebsiella and Proteus maybe treated with fluoroquinolones which are both IV and oral. The patient's wife preferred if the patient came home so that she could attend to his wounds however the patient expressed a desire to be admitted.     Today:  Patient has known atrial fibrillation, poorly controlled rate at this time on no medication.  Reports he is previously used Cardizem with adequate response.  Started on 03/16.  Tolerating decreased dose of insulin  OBJECTIVE/PHYSICAL EXAM     VITAL SIGNS (MOST RECENT):  Temp: 98 °F (36.7 °C) (03/16/24 0329)  Pulse: (!) 135 (03/16/24 0839)  Resp: 18 (03/16/24 0727)  BP: (!) 132/96 (03/16/24 0839)  SpO2: 99 % (03/16/24 0839) VITAL SIGNS (24 HOUR RANGE):  Temp:  [97.5 °F (36.4 °C)-98.7 °F (37.1 °C)] 98 °F (36.7 °C)  Pulse:  [131-135] 135  Resp:  [18-22] 18  SpO2:  [97 %-99 %] 99 %  BP: (129-145)/(77-97) 132/96   GENERAL: In no acute distress, afebrile  HEENT:  PERRLA  CHEST: Clear to auscultation bilaterally  HEART: S1, S2, no appreciable murmur  ABDOMEN: Soft, nontender, BS +  MSK: Warm, no lower extremity edema, no clubbing or cyanosis  NEUROLOGIC: Alert and oriented x4  INTEGUMENTARY:  See media  for wounds    ASSESSMENT/PLAN   Nonhealing chronic wound   VRE, Proteus, Pseudomonas, Klebsiella  S/p debridement with Dr. Jean on 02/27/2024  Zyvox and Tobramycin - will need 6 weeks of treatment given exposed bone 03/02/2024-04/12/2024  Wound VAC and wound care  Suspect low pre-albumin more indicative of inflammatory state versus poor nutritional status     Atrial Fibrillation  -diltiazem started 3/16  -No AC 2.2 patient request as he has has prior episodes of acute blood loss from wounds     Post op anemia   H&H improved s/p 2u pRBC 02/28/2024 and 1u pRBC on 02/29/2024  No anticoagulation at this time - aspirin d/c 02/28/2024     Hyperglycemia in setting of DM  A1c 02/23/2024 8.5%  Reports taking 80u Levemir BID  Continue home Sitagliptin     Hypertension  Hypertensive episodes are associated with pain    GISSEL/Hyperkalemia/Hypotension - resolved       Hx of: Neurogenic bladder, Quadriplegia     DVT prophylaxis:  SCDs, history of bleeding in the recent past  Anticipated discharge and disposition:  home with [facility] when antibiotics are complete   __________________________________________________________________________    LABS/MICRO/MEDS/DIAGNOSTICS     LABS  Recent Labs     03/15/24  1133 03/16/24  0804   NA  --  142   K  --  4.7   CHLORIDE  --  113*   CO2  --  19*   BUN  --  33.9*   CREATININE  --  1.11   GLUCOSE  --  140*   CALCIUM  --  8.9   ALBUMIN 2.2*  --      Recent Labs     03/16/24  0804   WBC 6.35   RBC 3.78*   HCT 33.0*   MCV 87.3   *     MICROBIOLOGY  Microbiology Results (last 7 days)       ** No results found for the last 168 hours. **             MEDICATIONS   acetaminophen  650 mg Oral QHS    ascorbic acid (vitamin C)  500 mg Oral Daily    cetirizine  10 mg Oral Daily    collagenase   Topical (Top) Daily    diltiaZEM  30 mg Oral Q6H    ferrous sulfate  1 tablet Oral Daily    insulin aspart U-100  10 Units Subcutaneous TIDWM    insulin detemir U-100  15 Units Subcutaneous BID     Lactobacillus acidophilus  1 capsule Oral TID WM    linezolid  600 mg Oral Q12H    magnesium sulfate IVPB  2 g Intravenous Once    miconazole   Topical (Top) BID    pantoprazole  40 mg Oral Daily    SITagliptin phosphate  100 mg Oral Daily    sodium chloride 0.9%  10 mL Intravenous Q6H    tobramycin IV (PEDS and ADULTS)  400 mg Intravenous Q48H         INFUSIONS           DIAGNOSTIC TESTS  X-Ray Chest 1 View   Final Result      Left PICC tip overlies the mid SVC.         Electronically signed by: Oj Soolmon   Date:    02/28/2024   Time:    14:29           EF   Date Value Ref Range Status   05/09/2023 60 % Final   07/18/2022 40 % Final        NUTRITION STATUS  Patient meets ASPEN criteria for   malnutrition of   per RD assessment as evidenced by:                       A minimum of two characteristics is recommended for diagnosis of either severe or non-severe malnutrition.     Case related differential diagnoses have been reviewed; assessment and plan has been documented. I have personally reviewed the labs and test results that are currently available; I have reviewed the patients medication list. I have reviewed the consulting providers recommendations. I have reviewed or attempted to review medical records based upon their availability.  All of the patient's and/or family's questions have been addressed and answered to the best of my ability.  I will continue to monitor closely and make adjustments to medical management as needed.  This document was created using M*Modal Fluency Direct.  Transcription errors may have been made.  Please contact me if any questions may rise regarding documentation to clarify transcription.      Thairy G Reyes,    Internal Medicine  Department of Hospital Medicine [facility] - [facility]

## 2024-03-17 LAB
GLUCOSE SERPL-MCNC: 154 MG/DL (ref 70–110)
GLUCOSE SERPL-MCNC: 163 MG/DL (ref 70–110)
GLUCOSE SERPL-MCNC: 205 MG/DL (ref 70–110)

## 2024-03-17 PROCEDURE — 25000003 PHARM REV CODE 250: Performed by: STUDENT IN AN ORGANIZED HEALTH CARE EDUCATION/TRAINING PROGRAM

## 2024-03-17 PROCEDURE — A4216 STERILE WATER/SALINE, 10 ML: HCPCS | Performed by: INTERNAL MEDICINE

## 2024-03-17 PROCEDURE — 63600175 PHARM REV CODE 636 W HCPCS: Performed by: STUDENT IN AN ORGANIZED HEALTH CARE EDUCATION/TRAINING PROGRAM

## 2024-03-17 PROCEDURE — 99900035 HC TECH TIME PER 15 MIN (STAT)

## 2024-03-17 PROCEDURE — 94760 N-INVAS EAR/PLS OXIMETRY 1: CPT

## 2024-03-17 PROCEDURE — 25000003 PHARM REV CODE 250: Performed by: PHYSICIAN ASSISTANT

## 2024-03-17 PROCEDURE — 25000003 PHARM REV CODE 250: Performed by: INTERNAL MEDICINE

## 2024-03-17 PROCEDURE — 11000004 HC SNF PRIVATE

## 2024-03-17 PROCEDURE — 63600175 PHARM REV CODE 636 W HCPCS: Performed by: PHYSICIAN ASSISTANT

## 2024-03-17 PROCEDURE — 27000207 HC ISOLATION

## 2024-03-17 RX ADMIN — SODIUM CHLORIDE, PRESERVATIVE FREE 10 ML: 5 INJECTION INTRAVENOUS at 06:03

## 2024-03-17 RX ADMIN — FERROUS SULFATE TAB 325 MG (65 MG ELEMENTAL FE) 1 EACH: 325 (65 FE) TAB at 08:03

## 2024-03-17 RX ADMIN — DILTIAZEM HYDROCHLORIDE 30 MG: 30 TABLET, FILM COATED ORAL at 12:03

## 2024-03-17 RX ADMIN — TOBRAMYCIN SULFATE 400 MG: 40 INJECTION, SOLUTION INTRAMUSCULAR; INTRAVENOUS at 09:03

## 2024-03-17 RX ADMIN — HYDROCODONE BITARTRATE AND ACETAMINOPHEN 1 TABLET: 7.5; 325 TABLET ORAL at 09:03

## 2024-03-17 RX ADMIN — COLLAGENASE SANTYL: 250 OINTMENT TOPICAL at 08:03

## 2024-03-17 RX ADMIN — LINEZOLID 600 MG: 600 TABLET, FILM COATED ORAL at 08:03

## 2024-03-17 RX ADMIN — Medication 1 CAPSULE: at 01:03

## 2024-03-17 RX ADMIN — DILTIAZEM HYDROCHLORIDE 30 MG: 30 TABLET, FILM COATED ORAL at 11:03

## 2024-03-17 RX ADMIN — HYDROCODONE BITARTRATE AND ACETAMINOPHEN 1 TABLET: 5; 325 TABLET ORAL at 04:03

## 2024-03-17 RX ADMIN — DILTIAZEM HYDROCHLORIDE 30 MG: 30 TABLET, FILM COATED ORAL at 06:03

## 2024-03-17 RX ADMIN — MICONAZOLE NITRATE: 20 CREAM TOPICAL at 08:03

## 2024-03-17 RX ADMIN — SODIUM CHLORIDE, PRESERVATIVE FREE 10 ML: 5 INJECTION INTRAVENOUS at 12:03

## 2024-03-17 RX ADMIN — SITAGLIPTIN 100 MG: 50 TABLET, FILM COATED ORAL at 08:03

## 2024-03-17 RX ADMIN — LOPERAMIDE HYDROCHLORIDE 4 MG: 2 CAPSULE ORAL at 08:03

## 2024-03-17 RX ADMIN — Medication 1 CAPSULE: at 08:03

## 2024-03-17 RX ADMIN — PANTOPRAZOLE SODIUM 40 MG: 40 TABLET, DELAYED RELEASE ORAL at 08:03

## 2024-03-17 RX ADMIN — MICONAZOLE NITRATE: 20 CREAM TOPICAL at 09:03

## 2024-03-17 RX ADMIN — SODIUM CHLORIDE, PRESERVATIVE FREE 10 ML: 5 INJECTION INTRAVENOUS at 01:03

## 2024-03-17 RX ADMIN — INSULIN ASPART 10 UNITS: 100 INJECTION, SOLUTION INTRAVENOUS; SUBCUTANEOUS at 08:03

## 2024-03-17 RX ADMIN — OXYCODONE HYDROCHLORIDE AND ACETAMINOPHEN 500 MG: 500 TABLET ORAL at 08:03

## 2024-03-17 RX ADMIN — Medication 1 CAPSULE: at 06:03

## 2024-03-17 RX ADMIN — INSULIN ASPART 10 UNITS: 100 INJECTION, SOLUTION INTRAVENOUS; SUBCUTANEOUS at 04:03

## 2024-03-17 RX ADMIN — ONDANSETRON 4 MG: 2 INJECTION INTRAMUSCULAR; INTRAVENOUS at 11:03

## 2024-03-17 RX ADMIN — CETIRIZINE HYDROCHLORIDE 10 MG: 10 TABLET, FILM COATED ORAL at 08:03

## 2024-03-17 RX ADMIN — INSULIN DETEMIR 15 UNITS: 100 INJECTION, SOLUTION SUBCUTANEOUS at 09:03

## 2024-03-17 RX ADMIN — LINEZOLID 600 MG: 600 TABLET, FILM COATED ORAL at 09:03

## 2024-03-17 RX ADMIN — INSULIN DETEMIR 15 UNITS: 100 INJECTION, SOLUTION SUBCUTANEOUS at 08:03

## 2024-03-17 RX ADMIN — SODIUM CHLORIDE, PRESERVATIVE FREE 10 ML: 5 INJECTION INTRAVENOUS at 11:03

## 2024-03-17 NOTE — PLAN OF CARE
Problem: Adult Inpatient Plan of Care  Goal: Plan of Care Review  Outcome: Ongoing, Progressing  Goal: Patient-Specific Goal (Individualized)  Outcome: Ongoing, Progressing  Goal: Absence of Hospital-Acquired Illness or Injury  Outcome: Ongoing, Progressing  Goal: Optimal Comfort and Wellbeing  Outcome: Ongoing, Progressing  Goal: Readiness for Transition of Care  Outcome: Ongoing, Progressing     Problem: Diabetes Comorbidity  Goal: Blood Glucose Level Within Targeted Range  Outcome: Ongoing, Progressing     Problem: Skin Injury Risk Increased  Goal: Skin Health and Integrity  Outcome: Ongoing, Progressing     Problem: Impaired Wound Healing  Goal: Optimal Wound Healing  Outcome: Ongoing, Progressing     Problem: Fall Injury Risk  Goal: Absence of Fall and Fall-Related Injury  Outcome: Ongoing, Progressing     Problem: Infection  Goal: Absence of Infection Signs and Symptoms  Outcome: Ongoing, Progressing

## 2024-03-17 NOTE — PLAN OF CARE
Problem: Adult Inpatient Plan of Care  Goal: Plan of Care Review  Outcome: Ongoing, Progressing  Flowsheets (Taken 3/16/2024 1937)  Plan of Care Reviewed With:   patient   family  Goal: Patient-Specific Goal (Individualized)  Outcome: Ongoing, Progressing  Flowsheets (Taken 3/16/2024 1937)  Anxieties, Fears or Concerns: none verbalized at this time  Individualized Care Needs: wound care management, monitor HR and BP, monitor CBGs, insulin supplementation  Goal: Absence of Hospital-Acquired Illness or Injury  Outcome: Ongoing, Progressing  Intervention: Identify and Manage Fall Risk  Flowsheets (Taken 3/16/2024 1937)  Safety Promotion/Fall Prevention:   assistive device/personal item within reach   diversional activities provided   bed alarm set   Fall Risk reviewed with patient/family   high risk medications identified   lighting adjusted   room near unit station   side rails raised x 3   supervised activity   instructed to call staff for mobility  Intervention: Prevent Skin Injury  Flowsheets (Taken 3/16/2024 1937)  Body Position:   heels elevated   upper extremity elevated   lower extremity elevated   30 degrees  Skin Protection:   adhesive use limited   incontinence pads utilized   tubing/devices free from skin contact   transparent dressing maintained  Intervention: Prevent and Manage VTE (Venous Thromboembolism) Risk  Flowsheets (Taken 3/16/2024 1937)  Activity Management: Patient unable to perform activities  VTE Prevention/Management:   bleeding precations maintained   bleeding risk assessed   fluids promoted   ROM (active) performed  Intervention: Prevent Infection  Flowsheets (Taken 3/16/2024 1937)  Infection Prevention:   environmental surveillance performed   equipment surfaces disinfected   personal protective equipment utilized   rest/sleep promoted   single patient room provided   hand hygiene promoted     Problem: Diabetes Comorbidity  Goal: Blood Glucose Level Within Targeted Range  Outcome:  Ongoing, Progressing  Intervention: Monitor and Manage Glycemia  Flowsheets (Taken 3/16/2024 1937)  Glycemic Management:   blood glucose monitored   oral hydration promoted   supplemental insulin given     Problem: Impaired Wound Healing  Goal: Optimal Wound Healing  Outcome: Ongoing, Progressing  Intervention: Promote Wound Healing  Flowsheets (Taken 3/16/2024 1937)  Oral Nutrition Promotion:   calorie-dense foods provided   calorie-dense liquids provided  Sleep/Rest Enhancement:   awakenings minimized   consistent schedule promoted   room darkened   noise level reduced   regular sleep/rest pattern promoted   relaxation techniques promoted  Activity Management: Patient unable to perform activities  Pain Management Interventions:   care clustered   medication offered   pain management plan reviewed with patient/caregiver   pillow support provided   relaxation techniques promoted   quiet environment facilitated

## 2024-03-17 NOTE — PROGRESS NOTES
[facility]  [facility] MEDICINE ~ PROGRESS NOTE  CHIEF COMPLAINT   Hospital follow up for nonhealing wound    HOSPITAL COURSE   58-year-old man with quadriplegic spinal paralysis and numerous decubitus ulcers who is followed by wound care is brought in today due to fever. He had wound cultures done 3 days ago that have grown Proteus mirabilis and Klebsiella pneumoniae. Sensitivities are not complete. The wounds were noted to have significant odor and heavy green drainage. In addition the patient had some nausea and vomiting and home health noted that his blood pressure was dipping. He was advised to come to the emergency room. Wound cultures were drawn from the right hip wound. His wife reports that when the wound is pressed above pus comes out. Both Klebsiella and Proteus maybe treated with fluoroquinolones which are both IV and oral. The patient's wife preferred if the patient came home so that she could attend to his wounds however the patient expressed a desire to be admitted.     Today:  No complaints, tolerating Cardizem  OBJECTIVE/PHYSICAL EXAM     VITAL SIGNS (MOST RECENT):  Temp: 99.2 °F (37.3 °C) (03/17/24 1131)  Pulse: 78 (03/17/24 1131)  Resp: 18 (03/17/24 0800)  BP: 118/81 (03/17/24 1131)  SpO2: 97 % (03/17/24 1131) VITAL SIGNS (24 HOUR RANGE):  Temp:  [97.8 °F (36.6 °C)-99.2 °F (37.3 °C)] 99.2 °F (37.3 °C)  Pulse:  [] 78  Resp:  [18-22] 18  SpO2:  [97 %-99 %] 97 %  BP: (102-165)/() 118/81   GENERAL: In no acute distress, afebrile  HEENT:  PERRLA  CHEST: Clear to auscultation bilaterally  HEART: S1, S2, no appreciable murmur  ABDOMEN: Soft, nontender, BS +  MSK: Warm, no lower extremity edema, no clubbing or cyanosis  NEUROLOGIC: Alert and oriented x4  INTEGUMENTARY:  See media for wounds    ASSESSMENT/PLAN   Nonhealing chronic wound   VRE, Proteus, Pseudomonas, Klebsiella  S/p debridement with Dr. Jean on 02/27/2024  Zyvox and Tobramycin - will need 6  weeks of treatment given exposed bone 03/02/2024-04/12/2024  Wound VAC and wound care  Suspect low pre-albumin more indicative of inflammatory state versus poor nutritional status     Atrial Fibrillation  -diltiazem started 3/16  -No AC 2.2 patient request as he has has prior episodes of acute blood loss from wounds     Post op anemia   H&H improved s/p 2u pRBC 02/28/2024 and 1u pRBC on 02/29/2024  No anticoagulation at this time - aspirin d/c 02/28/2024     Hyperglycemia in setting of DM  A1c 02/23/2024 8.5%  Reports taking 80u Levemir BID  Continue home Sitagliptin     Hypertension  Hypertensive episodes are associated with pain    GISSEL/Hyperkalemia/Hypotension - resolved       Hx of: Neurogenic bladder, Quadriplegia     DVT prophylaxis:  SCDs, history of bleeding in the recent past  Anticipated discharge and disposition:  home with C when antibiotics are complete   __________________________________________________________________________    LABS/MICRO/MEDS/DIAGNOSTICS     LABS  Recent Labs     03/15/24  1133 03/16/24  0804   NA  --  142   K  --  4.7   CHLORIDE  --  113*   CO2  --  19*   BUN  --  33.9*   CREATININE  --  1.11   GLUCOSE  --  140*   CALCIUM  --  8.9   ALBUMIN 2.2*  --      Recent Labs     03/16/24  0804   WBC 6.35   RBC 3.78*   HCT 33.0*   MCV 87.3   *     MICROBIOLOGY  Microbiology Results (last 7 days)       ** No results found for the last 168 hours. **             MEDICATIONS   acetaminophen  650 mg Oral QHS    ascorbic acid (vitamin C)  500 mg Oral Daily    cetirizine  10 mg Oral Daily    collagenase   Topical (Top) Daily    diltiaZEM  30 mg Oral Q6H    ferrous sulfate  1 tablet Oral Daily    insulin aspart U-100  10 Units Subcutaneous TIDWM    insulin detemir U-100  15 Units Subcutaneous BID    Lactobacillus acidophilus  1 capsule Oral TID WM    linezolid  600 mg Oral Q12H    loperamide  4 mg Oral Daily    miconazole   Topical (Top) BID    pantoprazole  40 mg Oral Daily    SITagliptin  phosphate  100 mg Oral Daily    sodium chloride 0.9%  10 mL Intravenous Q6H    tobramycin IV (PEDS and ADULTS)  400 mg Intravenous Q48H         INFUSIONS           DIAGNOSTIC TESTS  X-Ray Chest 1 View   Final Result      Left PICC tip overlies the mid SVC.         Electronically signed by: Oj Solomon   Date:    02/28/2024   Time:    14:29           EF   Date Value Ref Range Status   05/09/2023 60 % Final   07/18/2022 40 % Final        NUTRITION STATUS  Patient meets ASPEN criteria for   malnutrition of   per RD assessment as evidenced by:                       A minimum of two characteristics is recommended for diagnosis of either severe or non-severe malnutrition.     Case related differential diagnoses have been reviewed; assessment and plan has been documented. I have personally reviewed the labs and test results that are currently available; I have reviewed the patients medication list. I have reviewed the consulting providers recommendations. I have reviewed or attempted to review medical records based upon their availability.  All of the patient's and/or family's questions have been addressed and answered to the best of my ability.  I will continue to monitor closely and make adjustments to medical management as needed.  This document was created using M*Modal Fluency Direct.  Transcription errors may have been made.  Please contact me if any questions may rise regarding documentation to clarify transcription.      Thairy G Reyes,    Internal Medicine  Department of Hospital Medicine [facility] - [facility]

## 2024-03-18 LAB
ANION GAP SERPL CALC-SCNC: 9 MEQ/L
BASOPHILS # BLD AUTO: 0.03 X10(3)/MCL
BASOPHILS NFR BLD AUTO: 0.4 %
BUN SERPL-MCNC: 40.4 MG/DL (ref 8.4–25.7)
CALCIUM SERPL-MCNC: 8.6 MG/DL (ref 8.4–10.2)
CHLORIDE SERPL-SCNC: 109 MMOL/L (ref 98–107)
CO2 SERPL-SCNC: 20 MMOL/L (ref 22–29)
CREAT SERPL-MCNC: 1.17 MG/DL (ref 0.73–1.18)
CREAT/UREA NIT SERPL: 35
EOSINOPHIL # BLD AUTO: 0.45 X10(3)/MCL (ref 0–0.9)
EOSINOPHIL NFR BLD AUTO: 5.6 %
ERYTHROCYTE [DISTWIDTH] IN BLOOD BY AUTOMATED COUNT: 16.9 % (ref 11.5–17)
GFR SERPLBLD CREATININE-BSD FMLA CKD-EPI: >60 MLS/MIN/1.73/M2
GLUCOSE SERPL-MCNC: 134 MG/DL (ref 70–110)
GLUCOSE SERPL-MCNC: 135 MG/DL (ref 70–110)
GLUCOSE SERPL-MCNC: 143 MG/DL (ref 74–100)
GLUCOSE SERPL-MCNC: 97 MG/DL (ref 70–110)
HCT VFR BLD AUTO: 30.7 % (ref 42–52)
HGB BLD-MCNC: 9.5 G/DL (ref 14–18)
IMM GRANULOCYTES # BLD AUTO: 0.01 X10(3)/MCL (ref 0–0.04)
IMM GRANULOCYTES NFR BLD AUTO: 0.1 %
LYMPHOCYTES # BLD AUTO: 2.29 X10(3)/MCL (ref 0.6–4.6)
LYMPHOCYTES NFR BLD AUTO: 28.4 %
MAGNESIUM SERPL-MCNC: 2 MG/DL (ref 1.6–2.6)
MCH RBC QN AUTO: 27.1 PG (ref 27–31)
MCHC RBC AUTO-ENTMCNC: 30.9 G/DL (ref 33–36)
MCV RBC AUTO: 87.5 FL (ref 80–94)
MONOCYTES # BLD AUTO: 0.37 X10(3)/MCL (ref 0.1–1.3)
MONOCYTES NFR BLD AUTO: 4.6 %
NEUTROPHILS # BLD AUTO: 4.91 X10(3)/MCL (ref 2.1–9.2)
NEUTROPHILS NFR BLD AUTO: 60.9 %
PLATELET # BLD AUTO: 115 X10(3)/MCL (ref 130–400)
PMV BLD AUTO: 10.1 FL (ref 7.4–10.4)
POTASSIUM SERPL-SCNC: 4.9 MMOL/L (ref 3.5–5.1)
RBC # BLD AUTO: 3.51 X10(6)/MCL (ref 4.7–6.1)
SODIUM SERPL-SCNC: 138 MMOL/L (ref 136–145)
WBC # SPEC AUTO: 8.06 X10(3)/MCL (ref 4.5–11.5)

## 2024-03-18 PROCEDURE — 27000207 HC ISOLATION

## 2024-03-18 PROCEDURE — 25000003 PHARM REV CODE 250: Performed by: STUDENT IN AN ORGANIZED HEALTH CARE EDUCATION/TRAINING PROGRAM

## 2024-03-18 PROCEDURE — 94760 N-INVAS EAR/PLS OXIMETRY 1: CPT

## 2024-03-18 PROCEDURE — 85025 COMPLETE CBC W/AUTO DIFF WBC: CPT | Performed by: PHYSICIAN ASSISTANT

## 2024-03-18 PROCEDURE — 99900035 HC TECH TIME PER 15 MIN (STAT)

## 2024-03-18 PROCEDURE — 25000003 PHARM REV CODE 250: Performed by: INTERNAL MEDICINE

## 2024-03-18 PROCEDURE — 25000003 PHARM REV CODE 250: Performed by: PHYSICIAN ASSISTANT

## 2024-03-18 PROCEDURE — 97606 NEG PRS WND THER DME>50 SQCM: CPT

## 2024-03-18 PROCEDURE — 11000004 HC SNF PRIVATE

## 2024-03-18 PROCEDURE — 80048 BASIC METABOLIC PNL TOTAL CA: CPT | Performed by: PHYSICIAN ASSISTANT

## 2024-03-18 PROCEDURE — 97530 THERAPEUTIC ACTIVITIES: CPT

## 2024-03-18 PROCEDURE — 63600175 PHARM REV CODE 636 W HCPCS: Performed by: PHYSICIAN ASSISTANT

## 2024-03-18 PROCEDURE — 83735 ASSAY OF MAGNESIUM: CPT | Performed by: STUDENT IN AN ORGANIZED HEALTH CARE EDUCATION/TRAINING PROGRAM

## 2024-03-18 PROCEDURE — A4216 STERILE WATER/SALINE, 10 ML: HCPCS | Performed by: INTERNAL MEDICINE

## 2024-03-18 RX ORDER — SODIUM CHLORIDE 9 MG/ML
INJECTION, SOLUTION INTRAVENOUS CONTINUOUS
Status: DISCONTINUED | OUTPATIENT
Start: 2024-03-18 | End: 2024-03-18

## 2024-03-18 RX ADMIN — PANTOPRAZOLE SODIUM 40 MG: 40 TABLET, DELAYED RELEASE ORAL at 09:03

## 2024-03-18 RX ADMIN — CETIRIZINE HYDROCHLORIDE 10 MG: 10 TABLET, FILM COATED ORAL at 09:03

## 2024-03-18 RX ADMIN — HYDROCODONE BITARTRATE AND ACETAMINOPHEN 1 TABLET: 7.5; 325 TABLET ORAL at 09:03

## 2024-03-18 RX ADMIN — SODIUM CHLORIDE: 9 INJECTION, SOLUTION INTRAVENOUS at 10:03

## 2024-03-18 RX ADMIN — INSULIN DETEMIR 15 UNITS: 100 INJECTION, SOLUTION SUBCUTANEOUS at 09:03

## 2024-03-18 RX ADMIN — COLLAGENASE SANTYL: 250 OINTMENT TOPICAL at 10:03

## 2024-03-18 RX ADMIN — ACETAMINOPHEN 650 MG: 325 TABLET ORAL at 09:03

## 2024-03-18 RX ADMIN — LOPERAMIDE HYDROCHLORIDE 4 MG: 2 CAPSULE ORAL at 09:03

## 2024-03-18 RX ADMIN — INSULIN ASPART 10 UNITS: 100 INJECTION, SOLUTION INTRAVENOUS; SUBCUTANEOUS at 11:03

## 2024-03-18 RX ADMIN — DILTIAZEM HYDROCHLORIDE 30 MG: 30 TABLET, FILM COATED ORAL at 11:03

## 2024-03-18 RX ADMIN — ACETAMINOPHEN 650 MG: 325 TABLET ORAL at 11:03

## 2024-03-18 RX ADMIN — Medication 1 CAPSULE: at 11:03

## 2024-03-18 RX ADMIN — LINEZOLID 600 MG: 600 TABLET, FILM COATED ORAL at 09:03

## 2024-03-18 RX ADMIN — Medication 1 CAPSULE: at 05:03

## 2024-03-18 RX ADMIN — DILTIAZEM HYDROCHLORIDE 30 MG: 30 TABLET, FILM COATED ORAL at 06:03

## 2024-03-18 RX ADMIN — INSULIN ASPART 10 UNITS: 100 INJECTION, SOLUTION INTRAVENOUS; SUBCUTANEOUS at 05:03

## 2024-03-18 RX ADMIN — SODIUM CHLORIDE, PRESERVATIVE FREE 10 ML: 5 INJECTION INTRAVENOUS at 06:03

## 2024-03-18 RX ADMIN — SODIUM CHLORIDE, PRESERVATIVE FREE 10 ML: 5 INJECTION INTRAVENOUS at 12:03

## 2024-03-18 RX ADMIN — SODIUM CHLORIDE, PRESERVATIVE FREE 10 ML: 5 INJECTION INTRAVENOUS at 05:03

## 2024-03-18 RX ADMIN — INSULIN ASPART 10 UNITS: 100 INJECTION, SOLUTION INTRAVENOUS; SUBCUTANEOUS at 09:03

## 2024-03-18 RX ADMIN — OXYCODONE HYDROCHLORIDE AND ACETAMINOPHEN 500 MG: 500 TABLET ORAL at 09:03

## 2024-03-18 RX ADMIN — MICONAZOLE NITRATE: 20 CREAM TOPICAL at 10:03

## 2024-03-18 RX ADMIN — FERROUS SULFATE TAB 325 MG (65 MG ELEMENTAL FE) 1 EACH: 325 (65 FE) TAB at 09:03

## 2024-03-18 RX ADMIN — Medication 1 CAPSULE: at 09:03

## 2024-03-18 RX ADMIN — SITAGLIPTIN 100 MG: 50 TABLET, FILM COATED ORAL at 09:03

## 2024-03-18 NOTE — PROGRESS NOTES
Re-assessment performed.  NPWT continues to stay intact/ continuous @ -125mmHg between twice weekly dressing changes to Sacrum / R lateral buttock ulcerations.   L anterior and posterior ankle remain closed with hyperpigmented scar tissue to surface.  Only small open area remains to R plantar heel.  Left lateral hip ulceration continues to decrease in size with daily dressing changes.   Reapplied dressings per order.   Reinforced nutrition for healing with pt.  Verbalized understanding.   All pressure preventative measures continue as ordered with ALAL mattress, turn q 2 hrs, moisture management, heel lift boots.    03/18/24 1020   WOCN Assessment   WOCN Total Time (mins) 90   Visit Date 03/18/24   Consult Type Follow Up   WOCN Speciality Wound   Wound pressure   Continence Type Fecal   Procedure wound vac   Intervention assessed   Teaching on-going        Altered Skin Integrity 05/06/22 1140 Right Heel  Full thickness tissue loss. Subcutaneous fat may be visible but bone, tendon or muscle are not exposed   Date First Assessed/Time First Assessed: 05/06/22 1140   Altered Skin Integrity Present on Admission: yes  Side: Right  Location: Heel  Is this injury device related?: No  Primary Wound Type: (c)   Description of Altered Skin Integrity: Full thickness...   Wound Image    Description of Altered Skin Integrity Full thickness tissue loss. Subcutaneous fat may be visible but bone, tendon or muscle are not exposed   Dressing Appearance Intact;Moist drainage   Drainage Amount Small   Drainage Characteristics/Odor Serosanguineous;No odor;Bleeding controlled   Appearance Red;Granulating   Tissue loss description Full thickness   Red (%), Wound Tissue Color 100 %   Periwound Area Scar tissue;Dry   Wound Edges Irregular   Wound Length (cm) 0.8 cm   Wound Width (cm) 0.5 cm   Wound Depth (cm) 0.1 cm   Wound Volume (cm^3) 0.04 cm^3   Wound Surface Area (cm^2) 0.4 cm^2   Care Cleansed with:;Antimicrobial agent;Wound cleanser    Dressing Applied;Non-adherent;Hydrofiber;Silver;Gauze;Rolled gauze  (adaptic / aquacel ag)   Periwound Care Dry periwound area maintained;Skin barrier film applied   Off Loading Other (see comments)  (heel lift boot)        Altered Skin Integrity 01/12/23 1530 Sacral spine Full thickness tissue loss with exposed bone, tendon, or muscle. Often includes undermining and tunneling. May extend into muscle and/or supporting structures.   Date First Assessed/Time First Assessed: 01/12/23 1530   Altered Skin Integrity Present on Admission - Did Patient arrive to the hospital with altered skin?: yes  Location: Sacral spine  Description of Altered Skin Integrity: Full thickness tissue los...   Wound Image    Description of Altered Skin Integrity Full thickness tissue loss with exposed bone, tendon, or muscle. Often includes undermining and tunneling. May extend into muscle and/or supporting structures.   Dressing Appearance Intact   Drainage Amount Moderate   Drainage Characteristics/Odor Serosanguineous;No odor;Bleeding controlled   Appearance Red;Granulating;Tan;Yellow;Fibrin;Other (see comments)  (connective tissue)   Tissue loss description Full thickness   Red (%), Wound Tissue Color 55 %   Yellow (%), Wound Tissue Color 45 %   Periwound Area Denuded   Wound Edges Defined   Wound Length (cm) 9 cm   Wound Width (cm) 8 cm   Wound Depth (cm) 2.8 cm   Wound Volume (cm^3) 201.6 cm^3   Wound Surface Area (cm^2) 72 cm^2   Care Cleansed with:;Antimicrobial agent;Wound cleanser   Dressing Changed;Other (comment)  (black granufoam/drape)   Periwound Care Skin barrier film applied;Hydrocolloid barrier applied  (mastisol for additional adhesive)   Dressing Change Due 03/21/24        Altered Skin Integrity 08/01/23 1534 Left posterior Ankle Full thickness tissue loss. Base is covered by slough and/or eschar in the wound bed   Date First Assessed/Time First Assessed: 08/01/23 1534   Altered Skin Integrity Present on Admission - Did  Patient arrive to the hospital with altered skin?: yes  Side: Left  Orientation: posterior  Location: Ankle  Description of Altered Skin Integri...   Wound Image    Dressing Appearance Intact;Dry;Clean   Drainage Amount None   Appearance Purple;Maroon;Closed/resurfaced;Epithelialization   Periwound Area Scar tissue;Dry;Intact   Wound Length (cm) 0 cm   Wound Width (cm) 0 cm   Wound Depth (cm) 0 cm   Wound Volume (cm^3) 0 cm^3   Wound Surface Area (cm^2) 0 cm^2   Care Cleansed with:;Wound cleanser   Dressing Applied;Non-adherent;Gauze;Rolled gauze   Periwound Care Dry periwound area maintained   Compression Other (see comments)  (heel lift boots)   Dressing Change Due 03/20/24        Altered Skin Integrity 08/01/23 1535 Left anterior Ankle Other (comment) Full thickness tissue loss. Base is covered by slough and/or eschar in the wound bed   Date First Assessed/Time First Assessed: 08/01/23 1535   Altered Skin Integrity Present on Admission - Did Patient arrive to the hospital with altered skin?: yes  Side: Left  Orientation: anterior  Location: Ankle  Is this injury device related?: No  ...   Wound Image    Dressing Appearance Intact;Dry;Clean   Drainage Amount None   Appearance Pink;Red;Maroon;Purple;Epithelialization;Closed/resurfaced   Periwound Area Dry;Intact;Scar tissue   Wound Length (cm) 0 cm   Wound Width (cm) 0 cm   Wound Depth (cm) 0 cm   Wound Volume (cm^3) 0 cm^3   Wound Surface Area (cm^2) 0 cm^2   Care Cleansed with:;Wound cleanser   Dressing Applied;Non-adherent;Gauze;Rolled gauze   Off Loading Other (see comments)  (heel lift boots)   Dressing Change Due 03/20/24        Incision/Site 08/16/23 2011 Left Hip lateral   Date First Assessed/Time First Assessed: 08/16/23 2011   Side: Left  Location: Hip  Orientation: lateral   Wound Image    Dressing Appearance Intact;Dried drainage   Drainage Amount Small   Drainage Characteristics/Odor Serous;No odor;Bleeding controlled   Appearance  Red;Granulating;Tan;Fibrin   Red (%), Wound Tissue Color 95 %   Yellow (%), Wound Tissue Color 5 %   Periwound Area Dry;Scar tissue   Wound Edges Defined   Wound Length (cm) 2.5 cm   Wound Width (cm) 2.3 cm   Wound Depth (cm) 1.5 cm   Wound Volume (cm^3) 8.625 cm^3   Wound Surface Area (cm^2) 5.75 cm^2   Undermining (depth (cm)/location) 2cm @ 3 o'clock/ undermining from 1 - 11 o'clock   Care Cleansed with:;Antimicrobial agent   Dressing Sodium chloride impregnated;Gauze;Absorptive Pad;Other (comment)  (medfix)   Packing packed with;other (see comment)  (vashe moistened mesalt)   Periwound Care Skin barrier film applied   Dressing Change Due 03/19/24        Incision/Site 02/27/24 1450 Right Buttocks lateral   Date First Assessed/Time First Assessed: 02/27/24 1450   Side: Right  Location: Buttocks  Orientation: lateral  Additional Comments: mesalt, quick clot, gauze, abdomen pad, and tape   Wound Image     Dressing Appearance Intact;Moist drainage   Drainage Amount Moderate   Drainage Characteristics/Odor Serosanguineous;No odor;Bleeding controlled   Appearance Red;Granulating;Gray;Tan   Red (%), Wound Tissue Color 95 %   Yellow (%), Wound Tissue Color 5 %   Periwound Area Scar tissue;Intact   Wound Edges Defined   Wound Length (cm) 6.4 cm   Wound Width (cm) 12 cm   Wound Depth (cm) 1.5 cm   Wound Volume (cm^3) 115.2 cm^3   Wound Surface Area (cm^2) 76.8 cm^2   Undermining (depth (cm)/location) 5 cm at 1 o'clock / undermining from 12-2 o'clock   Care Cleansed with:;Antimicrobial agent;Wound cleanser   Dressing Changed;Other (comment)  (black granufoam / vac drape)   Periwound Care Dry periwound area maintained;Hydrocolloid barrier applied;Other (see comments)  (mastisol for additional adhesive)   Dressing Change Due 03/21/24        Negative Pressure Wound Therapy  02/29/24 1227   Placement Date/Time: 02/29/24 1227   Location: Sacral Spine   NPWT Type Vacuum Therapy   Therapy Setting NPWT Continuous therapy   Pressure  Setting NPWT 125 mmHg   Therapy Interventions NPWT Dressing changed;Seal intact   Sponges Inserted NPWT Black;4   Sponges Removed NPWT Black;4

## 2024-03-18 NOTE — PLAN OF CARE
Problem: Adult Inpatient Plan of Care  Goal: Plan of Care Review  Outcome: Ongoing, Progressing  Flowsheets (Taken 3/18/2024 1827)  Plan of Care Reviewed With:   patient   family  Goal: Patient-Specific Goal (Individualized)  Outcome: Ongoing, Progressing  Flowsheets (Taken 3/18/2024 1827)  Anxieties, Fears or Concerns: none verbalized  Individualized Care Needs: monitor HR and BP, pain management, turn q2, wound care, wound vac  Goal: Absence of Hospital-Acquired Illness or Injury  Outcome: Ongoing, Progressing  Intervention: Identify and Manage Fall Risk  Flowsheets (Taken 3/18/2024 1827)  Safety Promotion/Fall Prevention:   assistive device/personal item within reach   bed alarm set   family to remain at bedside   nonskid shoes/socks when out of bed   instructed to call staff for mobility   side rails raised x 3   room near unit station  Intervention: Prevent Skin Injury  Flowsheets (Taken 3/18/2024 1827)  Body Position: sitting up in bed  Skin Protection:   adhesive use limited   incontinence pads utilized   transparent dressing maintained   tubing/devices free from skin contact   skin-to-device areas padded   skin sealant/moisture barrier applied  Intervention: Prevent and Manage VTE (Venous Thromboembolism) Risk  Flowsheets (Taken 3/18/2024 1827)  Activity Management: Rolling - L1  VTE Prevention/Management:   bleeding precations maintained   fluids promoted   bleeding risk assessed  Range of Motion: active ROM (range of motion) encouraged  Intervention: Prevent Infection  Flowsheets (Taken 3/18/2024 1827)  Infection Prevention:   cohorting utilized   equipment surfaces disinfected   hand hygiene promoted  Goal: Optimal Comfort and Wellbeing  Outcome: Ongoing, Progressing  Intervention: Monitor Pain and Promote Comfort  Flowsheets (Taken 3/18/2024 1827)  Pain Management Interventions:   care clustered   relaxation techniques promoted   quiet environment facilitated   pain management plan reviewed with  patient/caregiver  Intervention: Provide Person-Centered Care  Flowsheets (Taken 3/18/2024 1827)  Trust Relationship/Rapport:   care explained   choices provided   questions encouraged  Goal: Readiness for Transition of Care  Outcome: Ongoing, Progressing  Intervention: Mutually Develop Transition Plan  Flowsheets (Taken 3/18/2024 1827)  Communicated SADE with patient/caregiver: Date not available/Unable to determine     Problem: Diabetes Comorbidity  Goal: Blood Glucose Level Within Targeted Range  Outcome: Ongoing, Progressing  Intervention: Monitor and Manage Glycemia  Flowsheets (Taken 3/18/2024 1827)  Glycemic Management:   blood glucose monitored   oral hydration promoted   supplemental insulin given     Problem: Skin Injury Risk Increased  Goal: Skin Health and Integrity  Outcome: Ongoing, Progressing  Intervention: Optimize Skin Protection  Flowsheets (Taken 3/18/2024 1827)  Pressure Reduction Techniques:   frequent weight shift encouraged   weight shift assistance provided   heels elevated off bed   pressure points protected   positioned off wounds   sit time limited to 2 hours  Pressure Reduction Devices:   alternating pressure pump (ADD)   pressure-redistributing mattress utilized   specialty bed utilized   positioning supports utilized   heel offloading device utilized  Skin Protection:   adhesive use limited   incontinence pads utilized   transparent dressing maintained   tubing/devices free from skin contact   skin-to-device areas padded   skin sealant/moisture barrier applied  Head of Bed (HOB) Positioning: HOB at 30-45 degrees  Intervention: Promote and Optimize Oral Intake  Flowsheets (Taken 3/18/2024 1827)  Oral Nutrition Promotion:   calorie-dense foods provided   calorie-dense liquids provided   physical activity promoted   nutritional therapy counseling provided     Problem: Impaired Wound Healing  Goal: Optimal Wound Healing  Outcome: Ongoing, Progressing  Intervention: Promote Wound  Healing  Flowsheets (Taken 3/18/2024 1827)  Oral Nutrition Promotion:   calorie-dense foods provided   calorie-dense liquids provided   physical activity promoted   nutritional therapy counseling provided  Sleep/Rest Enhancement:   awakenings minimized   regular sleep/rest pattern promoted  Activity Management: Rolling - L1  Pain Management Interventions:   care clustered   relaxation techniques promoted   quiet environment facilitated   pain management plan reviewed with patient/caregiver     Problem: Fall Injury Risk  Goal: Absence of Fall and Fall-Related Injury  Outcome: Ongoing, Progressing  Intervention: Identify and Manage Contributors  Flowsheets (Taken 3/18/2024 1827)  Self-Care Promotion:   independence encouraged   BADL personal objects within reach  Medication Review/Management: medications reviewed  Intervention: Promote Injury-Free Environment  Flowsheets (Taken 3/18/2024 1827)  Safety Promotion/Fall Prevention:   assistive device/personal item within reach   bed alarm set   family to remain at bedside   nonskid shoes/socks when out of bed   instructed to call staff for mobility   side rails raised x 3   room near unit station     Problem: Infection  Goal: Absence of Infection Signs and Symptoms  Outcome: Ongoing, Progressing  Intervention: Prevent or Manage Infection  Flowsheets (Taken 3/18/2024 1827)  Fever Reduction/Comfort Measures:   lightweight bedding   lightweight clothing  Infection Management: aseptic technique maintained

## 2024-03-18 NOTE — PROGRESS NOTES
Inpatient Nutrition Evaluation    Admit Date: 2/26/2024   Total duration of encounter: 21 days   Patient Age: 58 y.o.    Nutrition Recommendation/Prescription     Continue regular diet. Adjust diet, per patient request. Requesting sugar free.  2. Continue Arginaid 1 pk BID for wound healing 3. Continue ascorbic acid 500 mg PO daily 4. Weekly weights 5. Monitor intake, wt, labs, medications.       Nutrition Assessment     Chart Review    Reason Seen: continuous nutrition monitoring, length of stay, and follow-up    Malnutrition Screening Tool Results   Have you recently lost weight without trying?: Yes: 34 lbs or more  Have you been eating poorly because of a decreased appetite?: Yes   MST Score: 5   Diagnosis:  Infected wounds/sacrum, abd, feet  Klebsiella pneumoniae, Pseudomonas aeruginosa, Proteus mirabilis  DM,   Hypotension  Hyperglycemia  VRE  A-fib       Relevant Medical History:   Cardiac arrest         7/2022    Chronic skin ulcer      DM (diabetes mellitus)      Infected decubitus ulcer      Lymphedema of leg      Neurogenic bladder      Obesity, unspecified      Pressure ulcer of heel      Pressure ulcer of right foot, stage 3      Pressure ulcer of right ischium      Quadriplegia      Quadriplegic spinal paralysis        Scheduled Medications:  acetaminophen, 650 mg, QHS  ascorbic acid (vitamin C), 500 mg, Daily  cetirizine, 10 mg, Daily  collagenase, , Daily  diltiaZEM, 30 mg, Q6H  ferrous sulfate, 1 tablet, Daily  insulin aspart U-100, 10 Units, TIDWM  insulin detemir U-100, 15 Units, BID  Lactobacillus acidophilus, 1 capsule, TID WM  linezolid, 600 mg, Q12H  loperamide, 4 mg, Daily  miconazole, , BID  pantoprazole, 40 mg, Daily  SITagliptin phosphate, 100 mg, Daily  sodium chloride 0.9%, 10 mL, Q6H  tobramycin IV (PEDS and ADULTS), 400 mg, Q48H    Continuous Infusions:  sodium chloride 0.9%, Last Rate: 75 mL/hr at 03/18/24 1017    PRN Medications: 0.9%  NaCl infusion (for blood administration), 0.9%   NaCl infusion (for blood administration), sodium chloride 0.9%, acetaminophen, albuterol, benzonatate, bisacodyL, budesonide, calcium carbonate, dextrose 10%, dextrose 10%, diphenhydrAMINE, glucagon (human recombinant), glucose, glucose, hydrALAZINE, HYDROcodone-acetaminophen, HYDROcodone-acetaminophen, hydrOXYzine pamoate, insulin aspart U-100, melatonin, metoprolol, ondansetron, simethicone, Flushing PICC/Midline Protocol **AND** sodium chloride 0.9% **AND** sodium chloride 0.9%, tobramycin - pharmacy to dose, zolpidem    Recent Labs   Lab 03/13/24  0556 03/13/24  1046 03/14/24  0511 03/15/24  1133 03/16/24  0804 03/18/24  0545     --  140  --  142 138   K 4.6  --  4.6  --  4.7 4.9   CALCIUM 9.0  --  8.6  --  8.9 8.6   MG  --   --   --   --  1.80 2.00   CHLORIDE 113*  --  111*  --  113* 109*   CO2 19*  --  20*  --  19* 20*   BUN 27.6*  --  30.6*  --  33.9* 40.4*   CREATININE 1.06  --  0.98  --  1.11 1.17   EGFRNORACEVR >60  --  >60  --  >60 >60   GLUCOSE 174*  --  117*  --  140* 143*   ALBUMIN  --   --   --  2.2*  --   --    PREALB  --  <3.0*  --   --   --   --    CRP  --   --   --  48.70*  --   --    WBC 7.57  --   --   --  6.35 8.06   HGB 10.0*  --   --   --  10.2* 9.5*   HCT 31.8*  --   --   --  33.0* 30.7*     Nutrition Orders:  Diet Adult Regular  Dietary nutrition supplements Arginaid - Any flavor; BID    Appetite/Oral Intake: good/% of meals  Factors Affecting Nutritional Intake: none identified  Food/Christian/Cultural Preferences: none reported  Food Allergies: none reported  Last Bowel Movement: 03/15/24  Wound(s):     Altered Skin Integrity 05/06/22 1140 Right Heel  Full thickness tissue loss. Subcutaneous fat may be visible but bone, tendon or muscle are not exposed-Tissue loss description: Full thickness       Altered Skin Integrity 01/12/23 1530 Sacral spine Full thickness tissue loss with exposed bone, tendon, or muscle. Often includes undermining and tunneling. May extend into muscle  "and/or supporting structures.-Tissue loss description: Full thickness       Altered Skin Integrity 08/01/23 1534 Left posterior Ankle Full thickness tissue loss. Base is covered by slough and/or eschar in the wound bed-Tissue loss description: Not applicable       Altered Skin Integrity 08/01/23 1535 Left anterior Ankle Other (comment) Full thickness tissue loss. Base is covered by slough and/or eschar in the wound bed-Tissue loss description: Not applicable     Comments    Pt reports eating good. Pt eating outside food at lunch. Short ribs and mashed potatoes and carrots. Discussed nutrition for wound healing-Prealbumin less than 3, probably wound vac. Pt reports eating protein. Educated on good sources of protein. Pt also drinking wound healing supplement arginaid. Nursing reports wounds are healing well.   Anthropometrics    Height: 5' 7" (170.2 cm), Height Method: Stated  Last Weight: 94.4 kg (208 lb 1.8 oz) (03/18/24 0500), Weight Method: Bed Scale  BMI (Calculated): 32.6  BMI Classification: obese grade I (BMI 30-34.9)        Ideal Body Weight (IBW), Male: 148 lb     % Ideal Body Weight, Male (lb): 139.86 %                          Usual Weight Provided By: unable to obtain usual weight    Wt Readings from Last 5 Encounters:   03/18/24 94.4 kg (208 lb 1.8 oz)   02/27/24 93.9 kg (207 lb 0.2 oz)   02/23/24 93.9 kg (207 lb)   08/16/23 106.4 kg (234 lb 9.6 oz)   08/15/23 113.4 kg (250 lb)     Weight Change(s) Since Admission: Wt stable  Wt Readings from Last 1 Encounters:   03/18/24 0500 94.4 kg (208 lb 1.8 oz)   03/04/24 0500 95 kg (209 lb 7 oz)   02/26/24 1500 93.9 kg (207 lb)   Admit Weight: 93.9 kg (207 lb) (02/26/24 1500), Weight Method: Bed Scale (Used bed weight from admission 2/23/24 as his bed does not have a scale)    Patient Education     Not applicable.    Nutrition Goals & Monitoring     Dietitian will monitor: food and beverage intake, weight, weight change, electrolyte/renal panel, glucose/endocrine " profile, and gastrointestinal profile    Nutrition Risk/Follow-Up: low (follow-up in 5-7 days)  Patients assigned 'low nutrition risk' status do not qualify for a full nutritional assessment but will be monitored and re-evaluated in a 5-7 day time period. Please consult if re-evaluation needed sooner.

## 2024-03-18 NOTE — PT/OT/SLP PROGRESS
Physical Therapy Treatment Note           Name: Antonio Galvan II    : 1965 (58 y.o.)  MRN: [id number]           TREATMENT SUMMARY AND RECOMMENDATIONS:    PT Received On: 24  PT Start Time: 1230     PT Stop Time: 1255  PT Total Time (min): 25 min     Subjective Assessment:   No complaints  Lethargic   x Awake, alert, cooperative  Uncooperative    Agitated  c/o pain    Appropriate  c/o fatigue    Confused x Treated at bedside     Emotionally labile  Treated in gym/dept.    Impulsive  Other:    Flat affect       Therapy Precautions:    Cognitive deficits  Spinal precautions    Collar - hard  Sternal precautions    Collar - soft   TLSO    Fall risk  LSO    Hip precautions - posterior  Knee immobilizer    Hip precautions - anterior  WBAT    Impaired communication  Partial weightbearing    Oxygen  TTWB    PEG tube  NWB    Visual deficits x Other: Wound Vac and Super pubic cath    Hearing deficits  x OK 'd to get in chair 2-3 hours per day       Treatment Objectives:     Mobility Training:   Assist level Comments    Bed mobility Total A Rolling side to side for tess placement and lower body dressing    Transfer Total A Tess from bed>Chair; x 3 person assistance for proper positioning in chair    Gait     Sit to stand transitions     Sitting balance     Standing balance      Wheelchair mobility     Car transfer     Other:          Therapeutic Exercise:   Exercise Sets Reps Comments                               Additional Comments:  Extra time needed for proper positioning in WC once OOB. Wound care stating wounds look good but do recommend tess vs slide board to reduce sheering.     Will continue caregiver training on tess    Assessment: Patient tolerated session well - up in chair and outside.     PT Plan: Continue per POC  Revisions made to plan of care: yes, updated/upgraded.     GOALS:   Multidisciplinary Problems       Physical Therapy Goals          Problem: Physical Therapy    Goal  Priority Disciplines Outcome Goal Variances Interventions   Physical Therapy Goal     PT, PT/OT Ongoing, Progressing     Description: Goals to be met by: Discharge     Pt to be seen for ROM tasks 1-2 x/day to ensure proper positioning can be obtained to overall reduce impacts of prolonged bed rest and skin break down    Progress pending healing stages of current wounds    Patient will increase functional independence with mobility by performin. Pt to tolerate PROM tasks to B UE and LE, while obtaining 25% improvement in range.   2. Sitting EOB to tolerance pending wound healing - with pt demonstrating normal BP  3. Progress to OOB into chair once able. - Met  4. Pt to tolerate up to 3 hours in power chair - Met  5. Caregivers to be able to use etss with 1-2 people.                          Skilled PT Minutes Provided: 25   Communication with Treatment Team:     Equipment recommendations:       At end of treatment, patient remained:   Up in chair    x Up in wheelchair in room    In bed    With alarm activated    Bed rails up    Call bell in reach    x Family/friends present    Restraints secured properly    In bathroom with CNA/RN notified   x Nurse aware    In gym with therapist/tech    Other:

## 2024-03-18 NOTE — PLAN OF CARE
Problem: Adult Inpatient Plan of Care  Goal: Plan of Care Review  Outcome: Ongoing, Progressing  Flowsheets (Taken 3/17/2024 2000)  Plan of Care Reviewed With:   patient   family  Goal: Patient-Specific Goal (Individualized)  Outcome: Ongoing, Progressing  Flowsheets (Taken 3/17/2024 2000)  Anxieties, Fears or Concerns: none verbalized at this time  Individualized Care Needs: monitor heart rate, pain management  Goal: Absence of Hospital-Acquired Illness or Injury  Outcome: Ongoing, Progressing  Intervention: Identify and Manage Fall Risk  Flowsheets (Taken 3/17/2024 2000)  Safety Promotion/Fall Prevention:   assistive device/personal item within reach   bed alarm set   Fall Risk reviewed with patient/family   Fall Risk signage in place   family to remain at bedside   high risk medications identified   medications reviewed   nonskid shoes/socks when out of bed   instructed to call staff for mobility   room near unit station  Intervention: Prevent Skin Injury  Flowsheets (Taken 3/17/2024 2000)  Body Position: position maintained  Skin Protection:   adhesive use limited   incontinence pads utilized   tubing/devices free from skin contact  Intervention: Prevent and Manage VTE (Venous Thromboembolism) Risk  Flowsheets (Taken 3/17/2024 2000)  Activity Management: Patient unable to perform activities  Intervention: Prevent Infection  Flowsheets (Taken 3/17/2024 2000)  Infection Prevention:   cohorting utilized   environmental surveillance performed   hand hygiene promoted   rest/sleep promoted   single patient room provided  Goal: Optimal Comfort and Wellbeing  Outcome: Ongoing, Progressing  Intervention: Monitor Pain and Promote Comfort  Flowsheets (Taken 3/17/2024 2000)  Pain Management Interventions:   quiet environment facilitated   relaxation techniques promoted  Intervention: Provide Person-Centered Care  Flowsheets (Taken 3/17/2024 2000)  Trust Relationship/Rapport:   care explained   empathic listening  provided   questions answered   questions encouraged   thoughts/feelings acknowledged  Goal: Readiness for Transition of Care  Outcome: Ongoing, Progressing     Problem: Diabetes Comorbidity  Goal: Blood Glucose Level Within Targeted Range  Outcome: Ongoing, Progressing  Intervention: Monitor and Manage Glycemia  Flowsheets (Taken 3/17/2024 2000)  Glycemic Management: blood glucose monitored     Problem: Skin Injury Risk Increased  Goal: Skin Health and Integrity  Outcome: Ongoing, Progressing  Intervention: Optimize Skin Protection  Flowsheets (Taken 3/17/2024 2000)  Pressure Reduction Techniques:   frequent weight shift encouraged   heels elevated off bed   positioned off wounds   weight shift assistance provided  Pressure Reduction Devices:   heel offloading device utilized   specialty bed utilized   positioning supports utilized  Skin Protection:   adhesive use limited   incontinence pads utilized   tubing/devices free from skin contact  Head of Bed (HOB) Positioning: HOB at 20-30 degrees  Intervention: Promote and Optimize Oral Intake  Flowsheets (Taken 3/17/2024 2000)  Oral Nutrition Promotion:   calorie-dense foods provided   calorie-dense liquids provided     Problem: Impaired Wound Healing  Goal: Optimal Wound Healing  Outcome: Ongoing, Progressing  Intervention: Promote Wound Healing  Flowsheets (Taken 3/17/2024 2000)  Oral Nutrition Promotion:   calorie-dense foods provided   calorie-dense liquids provided  Sleep/Rest Enhancement:   awakenings minimized   regular sleep/rest pattern promoted  Activity Management: Patient unable to perform activities  Pain Management Interventions:   quiet environment facilitated   relaxation techniques promoted     Problem: Fall Injury Risk  Goal: Absence of Fall and Fall-Related Injury  Outcome: Ongoing, Progressing  Intervention: Identify and Manage Contributors  Flowsheets (Taken 3/17/2024 2000)  Self-Care Promotion:   BADL personal objects within reach   safe use of  adaptive equipment encouraged  Medication Review/Management:   medications reviewed   high-risk medications identified  Intervention: Promote Injury-Free Environment  Flowsheets (Taken 3/17/2024 2000)  Safety Promotion/Fall Prevention:   assistive device/personal item within reach   bed alarm set   Fall Risk reviewed with patient/family   Fall Risk signage in place   family to remain at bedside   high risk medications identified   medications reviewed   nonskid shoes/socks when out of bed   instructed to call staff for mobility   room near unit station     Problem: Infection  Goal: Absence of Infection Signs and Symptoms  Outcome: Ongoing, Progressing  Intervention: Prevent or Manage Infection  Flowsheets (Taken 3/17/2024 2000)  Fever Reduction/Comfort Measures:   lightweight bedding   lightweight clothing  Infection Management: aseptic technique maintained  Isolation Precautions: precautions maintained

## 2024-03-18 NOTE — PT/OT/SLP PROGRESS
Physical Therapy Treatment Note           Name: Antonio Galvan II    : 1965 (58 y.o.)  MRN: [id number]           TREATMENT SUMMARY AND RECOMMENDATIONS:    PT Received On: 24  PT Start Time: 1505     PT Stop Time: 1533  PT Total Time (min): 28 min     Subjective Assessment:   No complaints  Lethargic   x Awake, alert, cooperative  Uncooperative    Agitated  c/o pain    Appropriate  c/o fatigue    Confused x Treated at bedside     Emotionally labile  Treated in gym/dept.    Impulsive  Other:    Flat affect       Therapy Precautions:    Cognitive deficits  Spinal precautions    Collar - hard  Sternal precautions    Collar - soft   TLSO    Fall risk  LSO    Hip precautions - posterior  Knee immobilizer    Hip precautions - anterior  WBAT    Impaired communication  Partial weightbearing    Oxygen  TTWB    PEG tube  NWB    Visual deficits x Other: Wound Vac and Super pubic cath    Hearing deficits  x OK 'd to get in chair 2-3 hours per day       Treatment Objectives:     Mobility Training:   Assist level Comments    Bed mobility Total A Rolling side to side for tess pad removal and doffing pants.  Proper bed positioning completed with pt in supine using pillow for UE and LE to reduce skin break down.    Transfer Total A Tess from chair>bed; x 3 person assistance for proper positioning in chair    Gait     Sit to stand transitions     Sitting balance     Standing balance      Wheelchair mobility     Car transfer     Other:          Therapeutic Exercise:   Exercise Sets Reps Comments                               Additional Comments:  Extra time needed for proper positioning once back in bed. Skilled tx needed due to complexity of wound vac, wound assessment, caregiver training and proper positioning needed to reduce worsening of current condition.     Will continue caregiver training on tess    Assessment: Patient tolerated session well - no issues with autonomic responses or elevated BP.     PT  Plan: Continue per POC  Revisions made to plan of care: No    GOALS:   Multidisciplinary Problems       Physical Therapy Goals          Problem: Physical Therapy    Goal Priority Disciplines Outcome Goal Variances Interventions   Physical Therapy Goal     PT, PT/OT Ongoing, Progressing     Description: Goals to be met by: Discharge     Pt to be seen for ROM tasks 1-2 x/day to ensure proper positioning can be obtained to overall reduce impacts of prolonged bed rest and skin break down    Progress pending healing stages of current wounds    Patient will increase functional independence with mobility by performin. Pt to tolerate PROM tasks to B UE and LE, while obtaining 25% improvement in range.   2. Sitting EOB to tolerance pending wound healing - with pt demonstrating normal BP  3. Progress to OOB into chair once able. - Met  4. Pt to tolerate up to 3 hours in power chair - Met  5. Caregivers to be able to use tess with 1-2 people.                          Skilled PT Minutes Provided: 25   Communication with Treatment Team:     Equipment recommendations:       At end of treatment, patient remained:   Up in chair     Up in wheelchair in room   x In bed    With alarm activated    Bed rails up   x Call bell in reach    x Family/friends present    Restraints secured properly    In bathroom with CNA/RN notified   x Nurse aware    In gym with therapist/tech    Other:

## 2024-03-18 NOTE — PROGRESS NOTES
[facility]  [facility] [place] MEDICINE ~ PROGRESS NOTE  CHIEF COMPLAINT     Hospital follow up for nonhealing wound    HOSPITAL COURSE     58-year-old man with quadriplegic spinal paralysis and numerous decubitus ulcers who is followed by wound care is brought in today due to fever. He had wound cultures done 3 days ago that have grown Proteus mirabilis and Klebsiella pneumoniae. Sensitivities are not complete. The wounds were noted to have significant odor and heavy green drainage. In addition the patient had some nausea and vomiting and home health noted that his blood pressure was dipping. He was advised to come to the emergency room. Wound cultures were drawn from the right hip wound. His wife reports that when the wound is pressed above pus comes out. Both Klebsiella and Proteus maybe treated with fluoroquinolones which are both IV and oral. The patient's wife preferred if the patient came home so that she could attend to his wounds however the patient expressed a desire to be admitted.     Today:  No reported complaints today.  Heart rate was elevated this morning.  BUN slowly increasing therefore we will give another L of fluids today.    OBJECTIVE/PHYSICAL EXAM     VITAL SIGNS (MOST RECENT):  Temp: 98.6 °F (37 °C) (03/18/24 0311)  Pulse: (!) 130 (03/18/24 0823)  Resp: (P) 18 (03/18/24 0903)  BP: 107/71 (03/18/24 0311)  SpO2: 96 % (03/18/24 0823) VITAL SIGNS (24 HOUR RANGE):  Temp:  [98.2 °F (36.8 °C)-99.2 °F (37.3 °C)] 98.6 °F (37 °C)  Pulse:  [] 130  Resp:  [18-19] (P) 18  SpO2:  [96 %-99 %] 96 %  BP: (107-133)/(71-86) 107/71     GENERAL: In no acute distress, afebrile  HEENT:  PERRLA  CHEST: Clear to auscultation bilaterally  HEART: S1, S2, no appreciable murmur  ABDOMEN: Soft, nontender, BS +  MSK: Warm, no lower extremity edema, no clubbing or cyanosis  NEUROLOGIC: Alert and oriented x4  INTEGUMENTARY:  See media for wounds    ASSESSMENT/PLAN     Nonhealing chronic wound    VRE, Proteus, Pseudomonas, Klebsiella  S/p debridement with Dr. Jean on 02/27/2024  Zyvox and Tobramycin - will need 6 weeks of treatment given exposed bone 03/02/2024-04/12/2024  Wound VAC and wound care  Suspect low pre-albumin more indicative of inflammatory state vs poor nutritional status  Appreciate dietary recommendations for supplements     Atrial Fibrillation  Diltiazem started 3/16  No AC 2/2 patient request as he has has prior episodes of acute blood loss from wounds     Post op anemia   H&H improved s/p 2u pRBC 02/28/2024 and 1u pRBC on 02/29/2024  No anticoagulation at this time - aspirin d/c 02/28/2024     Hyperglycemia in setting of DM  A1c 02/23/2024 8.5%  Reports taking 80u Levemir BID  Continue home Sitagliptin  Continue titrating insulin regimen as needed     Hypertension  Hypertensive episodes are associated with pain    GISSEL/Hyperkalemia/Hypotension   NS x1 L at 75 cc an hour     Hx of: Neurogenic bladder, Quadriplegia     DVT prophylaxis:  SCDs, history of bleeding in the recent past    Anticipated discharge and disposition: home with [facility] when antibiotics are complete   __________________________________________________________________________    LABS/MICRO/MEDS/DIAGNOSTICS     LABS  Recent Labs     03/15/24  1133 03/16/24  0804 03/18/24  0545   NA  --    < > 138   K  --    < > 4.9   CHLORIDE  --    < > 109*   CO2  --    < > 20*   BUN  --    < > 40.4*   CREATININE  --    < > 1.17   GLUCOSE  --    < > 143*   CALCIUM  --    < > 8.6   ALBUMIN 2.2*  --   --     < > = values in this interval not displayed.     Recent Labs     03/18/24  0545   WBC 8.06   RBC 3.51*   HCT 30.7*   MCV 87.5   *     MICROBIOLOGY  Microbiology Results (last 7 days)       ** No results found for the last 168 hours. **             MEDICATIONS   acetaminophen  650 mg Oral QHS    ascorbic acid (vitamin C)  500 mg Oral Daily    cetirizine  10 mg Oral Daily    collagenase   Topical (Top) Daily    diltiaZEM  30 mg  Oral Q6H    ferrous sulfate  1 tablet Oral Daily    insulin aspart U-100  10 Units Subcutaneous TIDWM    insulin detemir U-100  15 Units Subcutaneous BID    Lactobacillus acidophilus  1 capsule Oral TID WM    linezolid  600 mg Oral Q12H    loperamide  4 mg Oral Daily    miconazole   Topical (Top) BID    pantoprazole  40 mg Oral Daily    SITagliptin phosphate  100 mg Oral Daily    sodium chloride 0.9%  10 mL Intravenous Q6H    tobramycin IV (PEDS and ADULTS)  400 mg Intravenous Q48H         INFUSIONS   sodium chloride 0.9%          DIAGNOSTIC TESTS  X-Ray Chest 1 View   Final Result      Left PICC tip overlies the mid SVC.         Electronically signed by: Oj Solomon   Date:    02/28/2024   Time:    14:29         EF   Date Value Ref Range Status   05/09/2023 60 % Final   07/18/2022 40 % Final        NUTRITION STATUS  Patient meets ASPEN criteria for   malnutrition of   per RD assessment as evidenced by:                       A minimum of two characteristics is recommended for diagnosis of either severe or non-severe malnutrition.     Case related differential diagnoses have been reviewed; assessment and plan has been documented. I have personally reviewed the labs and test results that are currently available; I have reviewed the patients medication list. I have reviewed the consulting providers recommendations. I have reviewed or attempted to review medical records based upon their availability.  All of the patient's and/or family's questions have been addressed and answered to the best of my ability.  I will continue to monitor closely and make adjustments to medical management as needed.  This document was created using M*Modal Fluency Direct.  Transcription errors may have been made.  Please contact me if any questions may rise regarding documentation to clarify transcription.      ANTHONY Vazquez   Internal Medicine  Department of Hospital Medicine [facility]

## 2024-03-19 LAB
GLUCOSE SERPL-MCNC: 109 MG/DL (ref 70–110)
GLUCOSE SERPL-MCNC: 142 MG/DL (ref 70–110)
GLUCOSE SERPL-MCNC: 165 MG/DL (ref 70–110)
PREALB SERPL-MCNC: 21.4 MG/DL (ref 18–45)
TOBRAMYCIN TROUGH SERPL-MCNC: 2 UG/ML (ref 0–2)

## 2024-03-19 PROCEDURE — 63600175 PHARM REV CODE 636 W HCPCS: Performed by: PHYSICIAN ASSISTANT

## 2024-03-19 PROCEDURE — 27000207 HC ISOLATION

## 2024-03-19 PROCEDURE — 99900035 HC TECH TIME PER 15 MIN (STAT)

## 2024-03-19 PROCEDURE — 94760 N-INVAS EAR/PLS OXIMETRY 1: CPT

## 2024-03-19 PROCEDURE — 11000004 HC SNF PRIVATE

## 2024-03-19 PROCEDURE — A4216 STERILE WATER/SALINE, 10 ML: HCPCS | Performed by: INTERNAL MEDICINE

## 2024-03-19 PROCEDURE — 25000003 PHARM REV CODE 250: Performed by: STUDENT IN AN ORGANIZED HEALTH CARE EDUCATION/TRAINING PROGRAM

## 2024-03-19 PROCEDURE — 25000003 PHARM REV CODE 250: Performed by: PHYSICIAN ASSISTANT

## 2024-03-19 PROCEDURE — 97530 THERAPEUTIC ACTIVITIES: CPT

## 2024-03-19 PROCEDURE — 80200 ASSAY OF TOBRAMYCIN: CPT | Performed by: STUDENT IN AN ORGANIZED HEALTH CARE EDUCATION/TRAINING PROGRAM

## 2024-03-19 PROCEDURE — 63600175 PHARM REV CODE 636 W HCPCS: Performed by: STUDENT IN AN ORGANIZED HEALTH CARE EDUCATION/TRAINING PROGRAM

## 2024-03-19 PROCEDURE — 25000003 PHARM REV CODE 250: Performed by: INTERNAL MEDICINE

## 2024-03-19 PROCEDURE — 84134 ASSAY OF PREALBUMIN: CPT | Performed by: STUDENT IN AN ORGANIZED HEALTH CARE EDUCATION/TRAINING PROGRAM

## 2024-03-19 RX ADMIN — DILTIAZEM HYDROCHLORIDE 30 MG: 30 TABLET, FILM COATED ORAL at 12:03

## 2024-03-19 RX ADMIN — TOBRAMYCIN SULFATE 400 MG: 40 INJECTION, SOLUTION INTRAMUSCULAR; INTRAVENOUS at 10:03

## 2024-03-19 RX ADMIN — DILTIAZEM HYDROCHLORIDE 30 MG: 30 TABLET, FILM COATED ORAL at 05:03

## 2024-03-19 RX ADMIN — LINEZOLID 600 MG: 600 TABLET, FILM COATED ORAL at 10:03

## 2024-03-19 RX ADMIN — INSULIN ASPART 10 UNITS: 100 INJECTION, SOLUTION INTRAVENOUS; SUBCUTANEOUS at 12:03

## 2024-03-19 RX ADMIN — HYDROCODONE BITARTRATE AND ACETAMINOPHEN 1 TABLET: 7.5; 325 TABLET ORAL at 12:03

## 2024-03-19 RX ADMIN — ACETAMINOPHEN 650 MG: 325 TABLET ORAL at 08:03

## 2024-03-19 RX ADMIN — SODIUM CHLORIDE, PRESERVATIVE FREE 10 ML: 5 INJECTION INTRAVENOUS at 12:03

## 2024-03-19 RX ADMIN — SODIUM CHLORIDE, PRESERVATIVE FREE 10 ML: 5 INJECTION INTRAVENOUS at 05:03

## 2024-03-19 RX ADMIN — CETIRIZINE HYDROCHLORIDE 10 MG: 10 TABLET, FILM COATED ORAL at 10:03

## 2024-03-19 RX ADMIN — SODIUM CHLORIDE: 9 INJECTION, SOLUTION INTRAVENOUS at 10:03

## 2024-03-19 RX ADMIN — MICONAZOLE NITRATE: 20 CREAM TOPICAL at 09:03

## 2024-03-19 RX ADMIN — FERROUS SULFATE TAB 325 MG (65 MG ELEMENTAL FE) 1 EACH: 325 (65 FE) TAB at 10:03

## 2024-03-19 RX ADMIN — INSULIN ASPART 10 UNITS: 100 INJECTION, SOLUTION INTRAVENOUS; SUBCUTANEOUS at 08:03

## 2024-03-19 RX ADMIN — SITAGLIPTIN 100 MG: 50 TABLET, FILM COATED ORAL at 10:03

## 2024-03-19 RX ADMIN — INSULIN DETEMIR 15 UNITS: 100 INJECTION, SOLUTION SUBCUTANEOUS at 10:03

## 2024-03-19 RX ADMIN — PANTOPRAZOLE SODIUM 40 MG: 40 TABLET, DELAYED RELEASE ORAL at 10:03

## 2024-03-19 RX ADMIN — DILTIAZEM HYDROCHLORIDE 30 MG: 30 TABLET, FILM COATED ORAL at 11:03

## 2024-03-19 RX ADMIN — Medication 1 CAPSULE: at 10:03

## 2024-03-19 RX ADMIN — LOPERAMIDE HYDROCHLORIDE 4 MG: 2 CAPSULE ORAL at 10:03

## 2024-03-19 RX ADMIN — Medication 1 CAPSULE: at 05:03

## 2024-03-19 RX ADMIN — OXYCODONE HYDROCHLORIDE AND ACETAMINOPHEN 500 MG: 500 TABLET ORAL at 10:03

## 2024-03-19 RX ADMIN — INSULIN ASPART 10 UNITS: 100 INJECTION, SOLUTION INTRAVENOUS; SUBCUTANEOUS at 05:03

## 2024-03-19 RX ADMIN — Medication 1 CAPSULE: at 12:03

## 2024-03-19 RX ADMIN — SODIUM CHLORIDE, PRESERVATIVE FREE 10 ML: 5 INJECTION INTRAVENOUS at 11:03

## 2024-03-19 RX ADMIN — ONDANSETRON 4 MG: 2 INJECTION INTRAMUSCULAR; INTRAVENOUS at 10:03

## 2024-03-19 RX ADMIN — INSULIN DETEMIR 15 UNITS: 100 INJECTION, SOLUTION SUBCUTANEOUS at 08:03

## 2024-03-19 RX ADMIN — HYDROCODONE BITARTRATE AND ACETAMINOPHEN 1 TABLET: 7.5; 325 TABLET ORAL at 08:03

## 2024-03-19 RX ADMIN — Medication 10 ML: at 10:03

## 2024-03-19 RX ADMIN — LINEZOLID 600 MG: 600 TABLET, FILM COATED ORAL at 08:03

## 2024-03-19 NOTE — PROGRESS NOTES
"Pharmacokinetic Follow Up: Tobramycin    Assessment of levels:   Random concentration of 2.0 mcg/mL (47 hours post-infusion) corresponds to a dosing interval of every 48 hours per the [place] [facility]    Regimen Plan:   Change dosing regimen to: Tobramycin 360 mg IV every 48 hours    Drug levels (last 3 results):  No results for input(s): "AMIKACINPEAK", "AMIKACINTROU", "AMIKACINRAND", "AMIKACIN" in the last 72 hours.    No results for input(s): "GENTAMICIN", "GENTPEAK", "GENTTROUGH", "GENT10", "GENT12", "GENT8", "GENTRANDOM" in the last 72 hours.    Recent Labs   Lab Result Units 03/19/24  0817   Tobramycin Trough ug/ml 2.0       Aminoglycoside Administrations:  aminoglycosides given in last 96 hours                     tobramycin (NEBCIN) 400 mg in dextrose 5 % (D5W) 100 mL IVPB (mg) 400 mg New Bag 03/19/24 1035     400 mg New Bag 03/17/24 0900                    Pharmacy will continue to monitor.    Please contact pharmacy at extension [phone] with any questions regarding this assessment.    Thank you for the consult,   Errol Garcia      Patient brief summary:  Antonio Galvan II is a 58 y.o. male initiated on aminoglycoside therapy for treatment of bone/joint infection    Drug Allergies:   Review of patient's allergies indicates:  No Known Allergies    Weights:   Wt Readings from Last 1 Encounters:   03/18/24 94.4 kg (208 lb 1.8 oz)     Ideal body weight: 66.1 kg (145 lb 11.6 oz)  Adjusted ideal body weight: 77.4 kg (170 lb 10.9 oz)    Renal Function:   Estimated Creatinine Clearance: 77.2 mL/min (based on SCr of 1.17 mg/dL).,     CBC (last 72 hours):  Recent Labs   Lab Result Units 03/18/24  0545   WBC x10(3)/mcL 8.06   Hgb g/dL 9.5*   Hct % 30.7*   Platelet x10(3)/mcL 115*   Mono % % 4.6   Eos % % 5.6   Basophil % % 0.4       Metabolic Panel (last 72 hours):  Recent Labs   Lab Result Units 03/18/24  0545   Sodium Level mmol/L 138   Potassium Level mmol/L 4.9   Chloride mmol/L 109*   Carbon Dioxide mmol/L 20* "   Glucose Level mg/dL 143*   Blood Urea Nitrogen mg/dL 40.4*   Creatinine mg/dL 1.17   Magnesium Level mg/dL 2.00       Microbiologic Results:  Microbiology Results (last 7 days)       ** No results found for the last 168 hours. **

## 2024-03-19 NOTE — PT/OT/SLP PROGRESS
Physical Therapy Treatment Note           Name: Antonio Galvan II    : 1965 (58 y.o.)  MRN: [id number]           TREATMENT SUMMARY AND RECOMMENDATIONS:    PT Received On: 24  PT Start Time: 1500     PT Stop Time: 1525  PT Total Time (min): 25 min     Subjective Assessment:   No complaints  Lethargic   x Awake, alert, cooperative  Uncooperative    Agitated  c/o pain    Appropriate  c/o fatigue    Confused x Treated at bedside     Emotionally labile  Treated in gym/dept.    Impulsive  Other:    Flat affect       Therapy Precautions:    Cognitive deficits  Spinal precautions    Collar - hard  Sternal precautions    Collar - soft   TLSO    Fall risk  LSO    Hip precautions - posterior  Knee immobilizer    Hip precautions - anterior  WBAT    Impaired communication  Partial weightbearing    Oxygen  TTWB    PEG tube  NWB    Visual deficits x Other: Wound Vac and Super pubic cath    Hearing deficits  x OK 'd to get in chair 2-3 hours per day       Treatment Objectives:     Mobility Training:   Assist level Comments    Bed mobility Total A Rolling side to side for tess pad removal and doffing pants.  Proper bed positioning completed with pt in supine using pillow for UE and LE to reduce skin break down.    Transfer Total A Tess from chair>bed; x 3 person assistance for proper positioning in chair    Gait     Sit to stand transitions     Sitting balance     Standing balance      Wheelchair mobility     Car transfer     Other:          Therapeutic Exercise:   Exercise Sets Reps Comments                               Additional Comments:  Extra time needed for proper positioning once back in bed. Skilled tx needed due to complexity of wound vac, wound assessment, caregiver training and proper positioning needed to reduce worsening of current condition.     Will continue caregiver training on tess    Assessment: Patient tolerated session well , but still needing 3 person assistance for positioning  during tess.     PT Plan: Continue per POC  Revisions made to plan of care: No    GOALS:   Multidisciplinary Problems       Physical Therapy Goals          Problem: Physical Therapy    Goal Priority Disciplines Outcome Goal Variances Interventions   Physical Therapy Goal     PT, PT/OT Ongoing, Progressing     Description: Goals to be met by: Discharge     Pt to be seen for ROM tasks 1-2 x/day to ensure proper positioning can be obtained to overall reduce impacts of prolonged bed rest and skin break down    Progress pending healing stages of current wounds    Patient will increase functional independence with mobility by performin. Pt to tolerate PROM tasks to B UE and LE, while obtaining 25% improvement in range.   2. Sitting EOB to tolerance pending wound healing - with pt demonstrating normal BP  3. Progress to OOB into chair once able. - Met  4. Pt to tolerate up to 3 hours in power chair - Met  5. Caregivers to be able to use tess with 1-2 people.                          Skilled PT Minutes Provided: 25   Communication with Treatment Team:     Equipment recommendations:       At end of treatment, patient remained:   Up in chair     Up in wheelchair in room   x In bed    With alarm activated    Bed rails up   x Call bell in reach    x Family/friends present    Restraints secured properly    In bathroom with CNA/RN notified   x Nurse aware    In gym with therapist/tech    Other:

## 2024-03-19 NOTE — PROGRESS NOTES
[facility]  [facility] MEDICINE ~ PROGRESS NOTE  CHIEF COMPLAINT     Hospital follow up for nonhealing wound    HOSPITAL COURSE     58-year-old man with quadriplegic spinal paralysis and numerous decubitus ulcers who is followed by wound care is brought in today due to fever. He had wound cultures done 3 days ago that have grown Proteus mirabilis and Klebsiella pneumoniae. Sensitivities are not complete. The wounds were noted to have significant odor and heavy green drainage. In addition the patient had some nausea and vomiting and home health noted that his blood pressure was dipping. He was advised to come to the emergency room. Wound cultures were drawn from the right hip wound. His wife reports that when the wound is pressed above pus comes out. Both Klebsiella and Proteus maybe treated with fluoroquinolones which are both IV and oral. The patient's wife preferred if the patient came home so that she could attend to his wounds however the patient expressed a desire to be admitted.     Today:  Left upper extremity noted to be swollen yesterday. US obtained revealed no DVT. Pre-albumin significantly improved today. Patient did complain of pain on exam 2/2 bowel movement.     OBJECTIVE/PHYSICAL EXAM     VITAL SIGNS (MOST RECENT):  Temp: 96.7 °F (35.9 °C) (03/19/24 1120)  Pulse: 64 (03/19/24 1120)  Resp: 18 (03/19/24 1120)  BP: 108/74 (03/19/24 1120)  SpO2: 97 % (03/19/24 1120) VITAL SIGNS (24 HOUR RANGE):  Temp:  [96.7 °F (35.9 °C)-98.6 °F (37 °C)] 96.7 °F (35.9 °C)  Pulse:  [] 64  Resp:  [17-20] 18  SpO2:  [97 %-99 %] 97 %  BP: ()/(61-79) 108/74     GENERAL: In no acute distress, afebrile  HEENT:  PERRLA  CHEST: Clear to auscultation bilaterally  HEART: S1, S2, no appreciable murmur  ABDOMEN: Soft, nontender, BS +  MSK: Warm, no lower extremity edema, no clubbing or cyanosis  NEUROLOGIC: Alert and oriented x4  INTEGUMENTARY:  See media for wounds    ASSESSMENT/PLAN      Nonhealing chronic wound   VRE, Proteus, Pseudomonas, Klebsiella  S/p debridement with Dr. Jean on 02/27/2024  Zyvox and Tobramycin - will need 6 weeks of treatment given exposed bone 03/02/2024-04/12/2024  Wound VAC and wound care  Pre-albumin now wnl   Appreciate dietary recommendations for supplements     Atrial Fibrillation  Diltiazem started 3/16  No AC 2/2 patient request as he has has prior episodes of acute blood loss from wounds     Post op anemia   H&H improved s/p 2u pRBC 02/28/2024 and 1u pRBC on 02/29/2024  No anticoagulation at this time - aspirin d/c 02/28/2024     Hyperglycemia in setting of DM  A1c 02/23/2024 8.5%  Reports taking 80u Levemir BID  Continue home Sitagliptin  Continue titrating insulin regimen as needed     Hypertension  Hypertensive episodes are associated with pain    GISSEL/Hyperkalemia/Hypotension   Monitor    Left upper extremity swelling  Improved today, ultrasound without DVT     Hx of: Neurogenic bladder, Quadriplegia     DVT prophylaxis:  SCDs, history of bleeding in the recent past    Anticipated discharge and disposition: home with [facility] when antibiotics are complete   __________________________________________________________________________    LABS/MICRO/MEDS/DIAGNOSTICS     LABS  Recent Labs     03/18/24  0545      K 4.9   CHLORIDE 109*   CO2 20*   BUN 40.4*   CREATININE 1.17   GLUCOSE 143*   CALCIUM 8.6     Recent Labs     03/18/24  0545   WBC 8.06   RBC 3.51*   HCT 30.7*   MCV 87.5   *     MICROBIOLOGY  Microbiology Results (last 7 days)       ** No results found for the last 168 hours. **             MEDICATIONS   acetaminophen  650 mg Oral QHS    ascorbic acid (vitamin C)  500 mg Oral Daily    cetirizine  10 mg Oral Daily    collagenase   Topical (Top) Daily    diltiaZEM  30 mg Oral Q6H    ferrous sulfate  1 tablet Oral Daily    insulin aspart U-100  10 Units Subcutaneous TIDWM    insulin detemir U-100  15 Units Subcutaneous BID    Lactobacillus  acidophilus  1 capsule Oral TID WM    linezolid  600 mg Oral Q12H    loperamide  4 mg Oral Daily    miconazole   Topical (Top) BID    pantoprazole  40 mg Oral Daily    SITagliptin phosphate  100 mg Oral Daily    sodium chloride 0.9%  10 mL Intravenous Q6H    tobramycin IV (PEDS and ADULTS)  400 mg Intravenous Q48H         INFUSIONS         DIAGNOSTIC TESTS  US Upper Extremity Veins Left   Final Result      No venous thrombosis identified.         Electronically signed by: Andrade Perez   Date:    03/18/2024   Time:    21:08      X-Ray Chest 1 View   Final Result      Left PICC tip overlies the mid SVC.         Electronically signed by: Oj Solomon   Date:    02/28/2024   Time:    14:29         EF   Date Value Ref Range Status   05/09/2023 60 % Final   07/18/2022 40 % Final        NUTRITION STATUS  Patient meets ASPEN criteria for   malnutrition of   per RD assessment as evidenced by:                       A minimum of two characteristics is recommended for diagnosis of either severe or non-severe malnutrition.     Case related differential diagnoses have been reviewed; assessment and plan has been documented. I have personally reviewed the labs and test results that are currently available; I have reviewed the patients medication list. I have reviewed the consulting providers recommendations. I have reviewed or attempted to review medical records based upon their availability.  All of the patient's and/or family's questions have been addressed and answered to the best of my ability.  I will continue to monitor closely and make adjustments to medical management as needed.  This document was created using M*Modal Fluency Direct.  Transcription errors may have been made.  Please contact me if any questions may rise regarding documentation to clarify transcription.      ANTHONY Vazquez   Internal Medicine  Department of Hospital Medicine [facility]

## 2024-03-19 NOTE — PLAN OF CARE
Problem: Adult Inpatient Plan of Care  Goal: Plan of Care Review  Outcome: Ongoing, Progressing  Flowsheets (Taken 3/19/2024 1349)  Plan of Care Reviewed With:   patient   family  Goal: Patient-Specific Goal (Individualized)  Outcome: Ongoing, Progressing  Flowsheets (Taken 3/19/2024 1349)  Anxieties, Fears or Concerns: none verbalized  Individualized Care Needs: monitor HR and BP, pain mangement, turn q2, wound care, wound vac  Goal: Absence of Hospital-Acquired Illness or Injury  Outcome: Ongoing, Progressing  Intervention: Identify and Manage Fall Risk  Flowsheets (Taken 3/19/2024 1349)  Safety Promotion/Fall Prevention:   assistive device/personal item within reach   chair alarm set   instructed to call staff for mobility   side rails raised x 3   in recliner, wheels locked  Intervention: Prevent Skin Injury  Flowsheets (Taken 3/19/2024 1349)  Body Position: position maintained  Skin Protection:   adhesive use limited   incontinence pads utilized   transparent dressing maintained   tubing/devices free from skin contact  Intervention: Prevent and Manage VTE (Venous Thromboembolism) Risk  Flowsheets (Taken 3/19/2024 1349)  Activity Management: Rolling - L1  VTE Prevention/Management:   bleeding precations maintained   bleeding risk assessed   fluids promoted  Range of Motion: active ROM (range of motion) encouraged  Intervention: Prevent Infection  Flowsheets (Taken 3/19/2024 1349)  Infection Prevention:   cohorting utilized   hand hygiene promoted   equipment surfaces disinfected   personal protective equipment utilized  Goal: Optimal Comfort and Wellbeing  Outcome: Ongoing, Progressing  Intervention: Monitor Pain and Promote Comfort  Flowsheets (Taken 3/19/2024 1349)  Pain Management Interventions:   care clustered   relaxation techniques promoted   quiet environment facilitated   pain management plan reviewed with patient/caregiver  Intervention: Provide Person-Centered Care  Flowsheets (Taken 3/19/2024  1345)  Trust Relationship/Rapport:   care explained   choices provided   questions encouraged  Goal: Readiness for Transition of Care  Outcome: Ongoing, Progressing  Intervention: Mutually Develop Transition Plan  Flowsheets (Taken 3/19/2024 1349)  Communicated SADE with patient/caregiver: Date not available/Unable to determine     Problem: Diabetes Comorbidity  Goal: Blood Glucose Level Within Targeted Range  Outcome: Ongoing, Progressing  Intervention: Monitor and Manage Glycemia  Flowsheets (Taken 3/19/2024 1349)  Glycemic Management:   blood glucose monitored   oral hydration promoted   supplemental insulin given     Problem: Skin Injury Risk Increased  Goal: Skin Health and Integrity  Outcome: Ongoing, Progressing  Intervention: Optimize Skin Protection  Flowsheets (Taken 3/19/2024 1349)  Pressure Reduction Techniques:   frequent weight shift encouraged   heels elevated off bed   positioned off wounds   pressure points protected   sit time limited to 2 hours   weight shift assistance provided  Pressure Reduction Devices:   heel offloading device utilized   pressure-redistributing mattress utilized   specialty bed utilized   alternating pressure pump (ADD)   foam padding utilized   positioning supports utilized   elbow protectors utilized  Skin Protection:   adhesive use limited   incontinence pads utilized   transparent dressing maintained   tubing/devices free from skin contact  Head of Bed (HOB) Positioning: HOB at 30-45 degrees  Intervention: Promote and Optimize Oral Intake  Flowsheets (Taken 3/19/2024 1349)  Oral Nutrition Promotion:   calorie-dense foods provided   calorie-dense liquids provided   physical activity promoted   nutritional therapy counseling provided     Problem: Impaired Wound Healing  Goal: Optimal Wound Healing  Outcome: Ongoing, Progressing  Intervention: Promote Wound Healing  Flowsheets (Taken 3/19/2024 1349)  Oral Nutrition Promotion:   calorie-dense foods provided   calorie-dense  liquids provided   physical activity promoted   nutritional therapy counseling provided  Sleep/Rest Enhancement:   awakenings minimized   regular sleep/rest pattern promoted  Activity Management: Rolling - L1  Pain Management Interventions:   care clustered   relaxation techniques promoted   quiet environment facilitated   pain management plan reviewed with patient/caregiver     Problem: Fall Injury Risk  Goal: Absence of Fall and Fall-Related Injury  Outcome: Ongoing, Progressing  Intervention: Identify and Manage Contributors  Flowsheets (Taken 3/19/2024 1349)  Self-Care Promotion:   independence encouraged   BADL personal objects within reach  Medication Review/Management: medications reviewed  Intervention: Promote Injury-Free Environment  Flowsheets (Taken 3/19/2024 1349)  Safety Promotion/Fall Prevention:   assistive device/personal item within reach   chair alarm set   instructed to call staff for mobility   side rails raised x 3   in recliner, wheels locked     Problem: Infection  Goal: Absence of Infection Signs and Symptoms  Outcome: Ongoing, Progressing  Intervention: Prevent or Manage Infection  Flowsheets (Taken 3/19/2024 1349)  Fever Reduction/Comfort Measures:   lightweight bedding   lightweight clothing  Infection Management: aseptic technique maintained  Isolation Precautions:   contact   precautions maintained

## 2024-03-19 NOTE — PT/OT/SLP PROGRESS
Physical Therapy Treatment Note           Name: Antonio Galvan II    : 1965 (58 y.o.)  MRN: [id number]           TREATMENT SUMMARY AND RECOMMENDATIONS:    PT Received On: 24  PT Start Time: 1145     PT Stop Time: 1205  PT Total Time (min): 20 min     Subjective Assessment:   No complaints  Lethargic   x Awake, alert, cooperative  Uncooperative    Agitated  c/o pain    Appropriate  c/o fatigue    Confused x Treated at bedside     Emotionally labile  Treated in gym/dept.    Impulsive  Other:    Flat affect       Therapy Precautions:    Cognitive deficits  Spinal precautions    Collar - hard  Sternal precautions    Collar - soft   TLSO    Fall risk  LSO    Hip precautions - posterior  Knee immobilizer    Hip precautions - anterior  WBAT    Impaired communication  Partial weightbearing    Oxygen  TTWB    PEG tube  NWB    Visual deficits x Other: Wound Vac and Super pubic cath    Hearing deficits  x OK 'd to get in chair 2-3 hours per day       Treatment Objectives:     Mobility Training:   Assist level Comments    Bed mobility Total A Rolling side to side for tess placement and lower body dressing    Transfer Total A Tess from bed>Chair; x 3 person assistance for proper positioning in chair    Gait     Sit to stand transitions     Sitting balance     Standing balance      Wheelchair mobility     Car transfer     Other:          Therapeutic Exercise:   Exercise Sets Reps Comments                               Additional Comments:  Extra time needed for proper positioning in WC once OOB.     Will continue caregiver training on tess    Assessment: Patient tolerated session well - up in chair and outside.     PT Plan: Continue per POC  Revisions made to plan of care: No    GOALS:   Multidisciplinary Problems       Physical Therapy Goals          Problem: Physical Therapy    Goal Priority Disciplines Outcome Goal Variances Interventions   Physical Therapy Goal     PT, PT/OT Ongoing, Progressing      Description: Goals to be met by: Discharge     Pt to be seen for ROM tasks 1-2 x/day to ensure proper positioning can be obtained to overall reduce impacts of prolonged bed rest and skin break down    Progress pending healing stages of current wounds    Patient will increase functional independence with mobility by performin. Pt to tolerate PROM tasks to B UE and LE, while obtaining 25% improvement in range.   2. Sitting EOB to tolerance pending wound healing - with pt demonstrating normal BP  3. Progress to OOB into chair once able. - Met  4. Pt to tolerate up to 3 hours in power chair - Met  5. Caregivers to be able to use tess with 1-2 people.                          Skilled PT Minutes Provided: 20   Communication with Treatment Team: Nursing in room assisting with tess pad placement    Equipment recommendations:       At end of treatment, patient remained:   Up in chair    x Up in wheelchair in room    In bed    With alarm activated    Bed rails up    Call bell in reach    x Family/friends present    Restraints secured properly    In bathroom with CNA/RN notified   x Nurse aware    In gym with therapist/tech    Other:

## 2024-03-20 LAB
GLUCOSE SERPL-MCNC: 125 MG/DL (ref 70–110)
GLUCOSE SERPL-MCNC: 127 MG/DL (ref 70–110)
GLUCOSE SERPL-MCNC: 151 MG/DL (ref 70–110)
GLUCOSE SERPL-MCNC: 88 MG/DL (ref 70–110)

## 2024-03-20 PROCEDURE — 27000207 HC ISOLATION

## 2024-03-20 PROCEDURE — 99900035 HC TECH TIME PER 15 MIN (STAT)

## 2024-03-20 PROCEDURE — 94760 N-INVAS EAR/PLS OXIMETRY 1: CPT

## 2024-03-20 PROCEDURE — 25000003 PHARM REV CODE 250: Performed by: INTERNAL MEDICINE

## 2024-03-20 PROCEDURE — 25000003 PHARM REV CODE 250: Performed by: STUDENT IN AN ORGANIZED HEALTH CARE EDUCATION/TRAINING PROGRAM

## 2024-03-20 PROCEDURE — 97530 THERAPEUTIC ACTIVITIES: CPT

## 2024-03-20 PROCEDURE — 63600175 PHARM REV CODE 636 W HCPCS: Performed by: PHYSICIAN ASSISTANT

## 2024-03-20 PROCEDURE — 97535 SELF CARE MNGMENT TRAINING: CPT

## 2024-03-20 PROCEDURE — 25000003 PHARM REV CODE 250: Performed by: PHYSICIAN ASSISTANT

## 2024-03-20 PROCEDURE — 11000004 HC SNF PRIVATE

## 2024-03-20 PROCEDURE — A4216 STERILE WATER/SALINE, 10 ML: HCPCS | Performed by: INTERNAL MEDICINE

## 2024-03-20 PROCEDURE — 87220 TISSUE EXAM FOR FUNGI: CPT | Performed by: STUDENT IN AN ORGANIZED HEALTH CARE EDUCATION/TRAINING PROGRAM

## 2024-03-20 RX ORDER — FLUCONAZOLE 200 MG/1
200 TABLET ORAL WEEKLY
Status: DISCONTINUED | OUTPATIENT
Start: 2024-03-20 | End: 2024-04-09 | Stop reason: HOSPADM

## 2024-03-20 RX ORDER — DILTIAZEM HYDROCHLORIDE 30 MG/1
30 TABLET, FILM COATED ORAL EVERY 12 HOURS
Status: DISCONTINUED | OUTPATIENT
Start: 2024-03-20 | End: 2024-03-22

## 2024-03-20 RX ADMIN — CETIRIZINE HYDROCHLORIDE 10 MG: 10 TABLET, FILM COATED ORAL at 08:03

## 2024-03-20 RX ADMIN — ACETAMINOPHEN 650 MG: 325 TABLET ORAL at 09:03

## 2024-03-20 RX ADMIN — DILTIAZEM HYDROCHLORIDE 30 MG: 30 TABLET, FILM COATED ORAL at 09:03

## 2024-03-20 RX ADMIN — INSULIN ASPART 10 UNITS: 100 INJECTION, SOLUTION INTRAVENOUS; SUBCUTANEOUS at 12:03

## 2024-03-20 RX ADMIN — DILTIAZEM HYDROCHLORIDE 30 MG: 30 TABLET, FILM COATED ORAL at 06:03

## 2024-03-20 RX ADMIN — HYDROCODONE BITARTRATE AND ACETAMINOPHEN 1 TABLET: 7.5; 325 TABLET ORAL at 08:03

## 2024-03-20 RX ADMIN — OXYCODONE HYDROCHLORIDE AND ACETAMINOPHEN 500 MG: 500 TABLET ORAL at 08:03

## 2024-03-20 RX ADMIN — INSULIN ASPART 10 UNITS: 100 INJECTION, SOLUTION INTRAVENOUS; SUBCUTANEOUS at 08:03

## 2024-03-20 RX ADMIN — INSULIN DETEMIR 15 UNITS: 100 INJECTION, SOLUTION SUBCUTANEOUS at 08:03

## 2024-03-20 RX ADMIN — Medication 1 CAPSULE: at 04:03

## 2024-03-20 RX ADMIN — PANTOPRAZOLE SODIUM 40 MG: 40 TABLET, DELAYED RELEASE ORAL at 08:03

## 2024-03-20 RX ADMIN — SODIUM CHLORIDE, PRESERVATIVE FREE 10 ML: 5 INJECTION INTRAVENOUS at 06:03

## 2024-03-20 RX ADMIN — LOPERAMIDE HYDROCHLORIDE 4 MG: 2 CAPSULE ORAL at 08:03

## 2024-03-20 RX ADMIN — FERROUS SULFATE TAB 325 MG (65 MG ELEMENTAL FE) 1 EACH: 325 (65 FE) TAB at 08:03

## 2024-03-20 RX ADMIN — LINEZOLID 600 MG: 600 TABLET, FILM COATED ORAL at 08:03

## 2024-03-20 RX ADMIN — SITAGLIPTIN 100 MG: 50 TABLET, FILM COATED ORAL at 08:03

## 2024-03-20 RX ADMIN — FLUCONAZOLE 200 MG: 200 TABLET ORAL at 04:03

## 2024-03-20 RX ADMIN — Medication 1 CAPSULE: at 08:03

## 2024-03-20 RX ADMIN — Medication 1 CAPSULE: at 12:03

## 2024-03-20 RX ADMIN — SODIUM CHLORIDE, PRESERVATIVE FREE 10 ML: 5 INJECTION INTRAVENOUS at 02:03

## 2024-03-20 RX ADMIN — DILTIAZEM HYDROCHLORIDE 30 MG: 30 TABLET, FILM COATED ORAL at 12:03

## 2024-03-20 RX ADMIN — LINEZOLID 600 MG: 600 TABLET, FILM COATED ORAL at 09:03

## 2024-03-20 NOTE — PROGRESS NOTES
[facility]  hospitals MEDICINE ~ PROGRESS NOTE  CHIEF COMPLAINT     Hospital follow up for nonhealing wound    HOSPITAL COURSE     58-year-old man with quadriplegic spinal paralysis and numerous decubitus ulcers who is followed by wound care is brought in today due to fever. He had wound cultures done 3 days ago that have grown Proteus mirabilis and Klebsiella pneumoniae. Sensitivities are not complete. The wounds were noted to have significant odor and heavy green drainage. In addition the patient had some nausea and vomiting and home health noted that his blood pressure was dipping. He was advised to come to the emergency room. Wound cultures were drawn from the right hip wound. His wife reports that when the wound is pressed above pus comes out. Both Klebsiella and Proteus maybe treated with fluoroquinolones which are both IV and oral. The patient's wife preferred if the patient came home so that she could attend to his wounds however the patient expressed a desire to be admitted.     Today:  No reported complaints today by patient. Nursing reports patient BP drops to 80s/60s in the afternoon/evening. Decreasing diltiazem dosing.    OBJECTIVE/PHYSICAL EXAM     VITAL SIGNS (MOST RECENT):  Temp: 98.6 °F (37 °C) (03/20/24 0759)  Pulse: (!) 130 (03/20/24 0759)  Resp: 18 (03/20/24 0859)  BP: (!) 140/84 (03/20/24 0759)  SpO2: 98 % (03/20/24 0759) VITAL SIGNS (24 HOUR RANGE):  Temp:  [98.1 °F (36.7 °C)-98.6 °F (37 °C)] 98.6 °F (37 °C)  Pulse:  [] 130  Resp:  [18-20] 18  SpO2:  [98 %-100 %] 98 %  BP: ()/(62-99) 140/84     GENERAL: In no acute distress, afebrile  HEENT:  PERRLA  CHEST: Clear to auscultation bilaterally  HEART: S1, S2, no appreciable murmur  ABDOMEN: Soft, nontender, BS +  MSK: Warm, no lower extremity edema, no clubbing or cyanosis  NEUROLOGIC: Alert and oriented x4  INTEGUMENTARY:  See media for wounds    ASSESSMENT/PLAN     Nonhealing chronic wound   VRE,  Proteus, Pseudomonas, Klebsiella  S/p debridement with Dr. Jean on 02/27/2024  Zyvox and Tobramycin - will need 6 weeks of treatment given exposed bone 03/02/2024-04/12/2024  Wound VAC and wound care  Pre-albumin now wnl   Appreciate dietary recommendations for supplements     Atrial Fibrillation  Hypertension/Hypotension   Diltiazem started 3/16/2024 - dose decreased to 30mg BID on 03/20/2024 due to hypotension  No AC 2/2 patient request as he has has prior episodes of acute blood loss from wounds - aspirin d/c 02/28/2024  Hypertensive episodes are associated with pain    Post op anemia   H&H improved s/p 2u pRBC 02/28/2024 and 1u pRBC on 02/29/2024  No anticoagulation at this time - aspirin d/c 02/28/2024     Hyperglycemia in setting of DM  A1c 02/23/2024 8.5%  Reports taking 80u Levemir BID  Continue home Sitagliptin  Continue titrating insulin regimen as needed    GISSEL/Hyperkalemia/Hypotension   Monitor    Left upper extremity swelling  Improved today, ultrasound without DVT     Hx of: Neurogenic bladder, Quadriplegia     DVT prophylaxis:  SCDs, history of bleeding in the recent past    Anticipated discharge and disposition: home with [facility] when antibiotics are complete   __________________________________________________________________________    LABS/MICRO/MEDS/DIAGNOSTICS     LABS  Recent Labs     03/18/24  0545      K 4.9   CHLORIDE 109*   CO2 20*   BUN 40.4*   CREATININE 1.17   GLUCOSE 143*   CALCIUM 8.6     Recent Labs     03/18/24  0545   WBC 8.06   RBC 3.51*   HCT 30.7*   MCV 87.5   *     MICROBIOLOGY  Microbiology Results (last 7 days)       Procedure Component Value Units Date/Time    Direct Prep (Skin, Hair, Nails) [[id number]]     Order Status: Sent Specimen: Skin              MEDICATIONS   acetaminophen  650 mg Oral QHS    ascorbic acid (vitamin C)  500 mg Oral Daily    cetirizine  10 mg Oral Daily    collagenase   Topical (Top) Daily    diltiaZEM  30 mg Oral Q12H    ferrous sulfate   1 tablet Oral Daily    insulin aspart U-100  10 Units Subcutaneous TIDWM    insulin detemir U-100  15 Units Subcutaneous BID    Lactobacillus acidophilus  1 capsule Oral TID WM    linezolid  600 mg Oral Q12H    loperamide  4 mg Oral Daily    miconazole   Topical (Top) BID    pantoprazole  40 mg Oral Daily    SITagliptin phosphate  100 mg Oral Daily    sodium chloride 0.9%  10 mL Intravenous Q6H    [START ON 3/22/2024] tobramycin IV (PEDS and ADULTS)  360 mg Intravenous Q48H         INFUSIONS         DIAGNOSTIC TESTS  US Upper Extremity Veins Left   Final Result      No venous thrombosis identified.         Electronically signed by: Andrade Perez   Date:    03/18/2024   Time:    21:08      X-Ray Chest 1 View   Final Result      Left PICC tip overlies the mid SVC.         Electronically signed by: Oj Solomon   Date:    02/28/2024   Time:    14:29         EF   Date Value Ref Range Status   05/09/2023 60 % Final   07/18/2022 40 % Final        NUTRITION STATUS  Patient meets ASPEN criteria for   malnutrition of   per RD assessment as evidenced by:                       A minimum of two characteristics is recommended for diagnosis of either severe or non-severe malnutrition.     Case related differential diagnoses have been reviewed; assessment and plan has been documented. I have personally reviewed the labs and test results that are currently available; I have reviewed the patients medication list. I have reviewed the consulting providers recommendations. I have reviewed or attempted to review medical records based upon their availability.  All of the patient's and/or family's questions have been addressed and answered to the best of my ability.  I will continue to monitor closely and make adjustments to medical management as needed.  This document was created using M*Modal Fluency Direct.  Transcription errors may have been made.  Please contact me if any questions may rise regarding documentation to clarify  transcription.      ANTHONY Vazquez   Internal Medicine  Department of Hospital Medicine [facility]

## 2024-03-20 NOTE — PT/OT/SLP PROGRESS
Physical Therapy Treatment Note           Name: Antonio Galvan II    : 1965 (58 y.o.)  MRN: [id number]           TREATMENT SUMMARY AND RECOMMENDATIONS:    PT Received On: 24  PT Start Time: 1435     PT Stop Time: 1458  PT Total Time (min): 23 min     Subjective Assessment:   No complaints  Lethargic   x Awake, alert, cooperative  Uncooperative    Agitated  c/o pain    Appropriate  c/o fatigue    Confused x Treated at bedside     Emotionally labile  Treated in gym/dept.    Impulsive  Other:    Flat affect       Therapy Precautions:    Cognitive deficits  Spinal precautions    Collar - hard  Sternal precautions    Collar - soft   TLSO    Fall risk  LSO    Hip precautions - posterior  Knee immobilizer    Hip precautions - anterior  WBAT    Impaired communication  Partial weightbearing    Oxygen  TTWB    PEG tube  NWB    Visual deficits x Other: Wound Vac and Super pubic cath    Hearing deficits  x OK 'd to get in chair 2-3 hours per day       Treatment Objectives:     Mobility Training:   Assist level Comments    Bed mobility Total A Rolling side to side for tess pad removal and doffing pants.  Proper bed positioning completed with pt in supine using pillow for UE and LE to reduce skin break down.    Transfer Total A Tess from chair>bed; x 3 person assistance for proper positioning in chair    Gait     Sit to stand transitions     Sitting balance     Standing balance      Wheelchair mobility     Car transfer     Other:          Therapeutic Exercise:   Exercise Sets Reps Comments                               Additional Comments:  Extra time needed for proper positioning once back in bed. Skilled tx needed due to complexity of wound vac, wound assessment, caregiver training and proper positioning needed to reduce worsening of current condition.     Will continue caregiver training on tess    Assessment: Patient tolerated session well , but still needing 3 person assistance for positioning  during tess.     PT Plan: Continue per POC  Revisions made to plan of care: No    GOALS:   Multidisciplinary Problems       Physical Therapy Goals          Problem: Physical Therapy    Goal Priority Disciplines Outcome Goal Variances Interventions   Physical Therapy Goal     PT, PT/OT Ongoing, Progressing     Description: Goals to be met by: Discharge     Pt to be seen for ROM tasks 1-2 x/day to ensure proper positioning can be obtained to overall reduce impacts of prolonged bed rest and skin break down    Progress pending healing stages of current wounds    Patient will increase functional independence with mobility by performin. Pt to tolerate PROM tasks to B UE and LE, while obtaining 25% improvement in range.   2. Sitting EOB to tolerance pending wound healing - with pt demonstrating normal BP  3. Progress to OOB into chair once able. - Met  4. Pt to tolerate up to 3 hours in power chair - Met  5. Caregivers to be able to use tess with 1-2 people.                          Skilled PT Minutes Provided: 23   Communication with Treatment Team:     Equipment recommendations:       At end of treatment, patient remained:   Up in chair     Up in wheelchair in room   x In bed    With alarm activated    Bed rails up   x Call bell in reach    x Family/friends present    Restraints secured properly    In bathroom with CNA/RN notified   x Nurse aware    In gym with therapist/tech    Other:

## 2024-03-20 NOTE — PT/OT/SLP PROGRESS
Physical Therapy Treatment Note           Name: Antonio Galvan II    : 1965 (58 y.o.)  MRN: [id number]           TREATMENT SUMMARY AND RECOMMENDATIONS:    PT Received On: 24  PT Start Time: 1105     PT Stop Time: 1135  PT Total Time (min): 30 min     Subjective Assessment:   No complaints  Lethargic   x Awake, alert, cooperative  Uncooperative    Agitated  c/o pain    Appropriate  c/o fatigue    Confused x Treated at bedside     Emotionally labile  Treated in gym/dept.    Impulsive  Other:    Flat affect       Therapy Precautions:    Cognitive deficits  Spinal precautions    Collar - hard  Sternal precautions    Collar - soft   TLSO    Fall risk  LSO    Hip precautions - posterior  Knee immobilizer    Hip precautions - anterior  WBAT    Impaired communication  Partial weightbearing    Oxygen  TTWB    PEG tube  NWB    Visual deficits x Other: Wound Vac and Super pubic cath    Hearing deficits  x OK 'd to get in chair 2-3 hours per day       Treatment Objectives:     Mobility Training:   Assist level Comments    Bed mobility Total A Rolling side to side for tess placement and lower body dressing    Transfer Total A Tess from bed>Chair; x 3 person assistance for proper positioning in chair    Gait     Sit to stand transitions     Sitting balance     Standing balance      Wheelchair mobility     Car transfer     Other:          Therapeutic Exercise:   Exercise Sets Reps Comments                               Additional Comments:  Extra time needed for proper positioning in WC once OOB. Wound care stating wounds look good but do recommend tess vs slide board to reduce sheering.     Will continue caregiver training on tess; trying to reduce from 3 person assistance    Assessment: Patient tolerated session well - up in chair and outside.     PT Plan: Continue per POC  Revisions made to plan of care: no     GOALS:   Multidisciplinary Problems       Physical Therapy Goals          Problem:  Physical Therapy    Goal Priority Disciplines Outcome Goal Variances Interventions   Physical Therapy Goal     PT, PT/OT Ongoing, Progressing     Description: Goals to be met by: Discharge     Pt to be seen for ROM tasks 1-2 x/day to ensure proper positioning can be obtained to overall reduce impacts of prolonged bed rest and skin break down    Progress pending healing stages of current wounds    Patient will increase functional independence with mobility by performin. Pt to tolerate PROM tasks to B UE and LE, while obtaining 25% improvement in range.   2. Sitting EOB to tolerance pending wound healing - with pt demonstrating normal BP  3. Progress to OOB into chair once able. - Met  4. Pt to tolerate up to 3 hours in power chair - Met  5. Caregivers to be able to use tess with 1-2 people.                          Skilled PT Minutes Provided: 25   Communication with Treatment Team:     Equipment recommendations:       At end of treatment, patient remained:   Up in chair    x Up in wheelchair in room    In bed    With alarm activated    Bed rails up    Call bell in reach    x Family/friends present    Restraints secured properly    In bathroom with CNA/RN notified   x Nurse aware    In gym with therapist/tech    Other:

## 2024-03-20 NOTE — PLAN OF CARE
Problem: Adult Inpatient Plan of Care  Goal: Plan of Care Review  Outcome: Ongoing, Progressing  Flowsheets (Taken 3/20/2024 0142)  Plan of Care Reviewed With: patient  Goal: Patient-Specific Goal (Individualized)  Outcome: Ongoing, Progressing  Goal: Absence of Hospital-Acquired Illness or Injury  Outcome: Ongoing, Progressing  Intervention: Identify and Manage Fall Risk  Flowsheets (Taken 3/20/2024 0142)  Safety Promotion/Fall Prevention:   assistive device/personal item within reach   bed alarm set   side rails raised x 3   instructed to call staff for mobility  Intervention: Prevent Skin Injury  Flowsheets (Taken 3/20/2024 0142)  Body Position: weight shifting  Skin Protection:   adhesive use limited   incontinence pads utilized  Intervention: Prevent and Manage VTE (Venous Thromboembolism) Risk  Flowsheets (Taken 3/20/2024 0142)  Activity Management: Rolling - L1  VTE Prevention/Management:   bleeding precations maintained   fluids promoted  Intervention: Prevent Infection  Flowsheets (Taken 3/20/2024 0142)  Infection Prevention:   hand hygiene promoted   personal protective equipment utilized   visitors restricted/screened   single patient room provided   rest/sleep promoted  Goal: Optimal Comfort and Wellbeing  Outcome: Ongoing, Progressing  Intervention: Monitor Pain and Promote Comfort  Flowsheets (Taken 3/20/2024 0142)  Pain Management Interventions:   medication offered   quiet environment facilitated  Intervention: Provide Person-Centered Care  Flowsheets (Taken 3/20/2024 0142)  Trust Relationship/Rapport:   care explained   questions answered   reassurance provided  Goal: Readiness for Transition of Care  Outcome: Ongoing, Progressing

## 2024-03-20 NOTE — PLAN OF CARE
[facility]  Discharge Reassessment    Primary Care Provider: Jennifer Fernando NP    Expected Discharge Date:     Reassessment (most recent)       Discharge Reassessment - 03/19/24 1930          Discharge Reassessment    Assessment Type Discharge Planning Reassessment     Did the patient's condition or plan change since previous assessment? No     Discharge Plan discussed with: Patient     Communicated SADE with patient/caregiver Date not available/Unable to determine     Discharge Plan A Home with family;Home Health     DME Needed Upon Discharge  none     Transition of Care Barriers None     Why the patient remains in the hospital Requires continued medical care        Post-Acute Status    Post-Acute Authorization Home Health     Home Health Status Pending medical clearance/testing     Hospital Resources/Appts/Education Provided Provided patient/caregiver with written discharge plan information     Discharge Delays None known at this time

## 2024-03-20 NOTE — PLAN OF CARE
Problem: Adult Inpatient Plan of Care  Goal: Plan of Care Review  Outcome: Ongoing, Progressing  Flowsheets (Taken 3/20/2024 1553)  Plan of Care Reviewed With:   patient   mother  Goal: Patient-Specific Goal (Individualized)  Outcome: Ongoing, Progressing  Flowsheets (Taken 3/20/2024 1553)  Anxieties, Fears or Concerns: none verbalized  Individualized Care Needs: monitor HR and BP, pain management, turn q2, wound care, wound vac  Goal: Absence of Hospital-Acquired Illness or Injury  Outcome: Ongoing, Progressing  Intervention: Identify and Manage Fall Risk  Flowsheets (Taken 3/20/2024 1553)  Safety Promotion/Fall Prevention:   assistive device/personal item within reach   bed alarm set   nonskid shoes/socks when out of bed   instructed to call staff for mobility   side rails raised x 3  Intervention: Prevent Skin Injury  Flowsheets (Taken 3/20/2024 1553)  Body Position: sitting up in bed  Skin Protection:   adhesive use limited   incontinence pads utilized   transparent dressing maintained   tubing/devices free from skin contact  Intervention: Prevent and Manage VTE (Venous Thromboembolism) Risk  Flowsheets (Taken 3/20/2024 1553)  Activity Management: Up in chair - L3  VTE Prevention/Management:   bleeding precations maintained   bleeding risk assessed   fluids promoted  Range of Motion: active ROM (range of motion) encouraged  Intervention: Prevent Infection  Flowsheets (Taken 3/20/2024 1553)  Infection Prevention:   equipment surfaces disinfected   hand hygiene promoted   personal protective equipment utilized   cohorting utilized  Goal: Optimal Comfort and Wellbeing  Outcome: Ongoing, Progressing  Intervention: Monitor Pain and Promote Comfort  Flowsheets (Taken 3/20/2024 1553)  Pain Management Interventions:   care clustered   relaxation techniques promoted   quiet environment facilitated   pain management plan reviewed with patient/caregiver  Intervention: Provide Person-Centered Care  Flowsheets (Taken  3/20/2024 1553)  Trust Relationship/Rapport:   care explained   choices provided   questions encouraged  Goal: Readiness for Transition of Care  Outcome: Ongoing, Progressing  Intervention: Mutually Develop Transition Plan  Flowsheets (Taken 3/20/2024 1553)  Communicated SADE with patient/caregiver: Date not available/Unable to determine     Problem: Diabetes Comorbidity  Goal: Blood Glucose Level Within Targeted Range  Outcome: Ongoing, Progressing  Intervention: Monitor and Manage Glycemia  Flowsheets (Taken 3/20/2024 1553)  Glycemic Management:   blood glucose monitored   oral hydration promoted   supplemental insulin given     Problem: Skin Injury Risk Increased  Goal: Skin Health and Integrity  Outcome: Ongoing, Progressing  Intervention: Optimize Skin Protection  Flowsheets (Taken 3/20/2024 1553)  Pressure Reduction Techniques:   frequent weight shift encouraged   weight shift assistance provided   positioned off wounds  Pressure Reduction Devices:   foam padding utilized   heel offloading device utilized   positioning supports utilized   pressure-redistributing mattress utilized   alternating pressure pump (ADD)   specialty bed utilized  Skin Protection:   adhesive use limited   incontinence pads utilized   transparent dressing maintained   tubing/devices free from skin contact  Head of Bed (HOB) Positioning: HOB at 30-45 degrees  Intervention: Promote and Optimize Oral Intake  Flowsheets (Taken 3/20/2024 1553)  Oral Nutrition Promotion:   calorie-dense foods provided   calorie-dense liquids provided     Problem: Impaired Wound Healing  Goal: Optimal Wound Healing  Outcome: Ongoing, Progressing  Intervention: Promote Wound Healing  Flowsheets (Taken 3/20/2024 1553)  Oral Nutrition Promotion:   calorie-dense foods provided   calorie-dense liquids provided  Sleep/Rest Enhancement:   awakenings minimized   regular sleep/rest pattern promoted  Activity Management: Up in chair - L3  Pain Management Interventions:    care clustered   relaxation techniques promoted   quiet environment facilitated   pain management plan reviewed with patient/caregiver     Problem: Fall Injury Risk  Goal: Absence of Fall and Fall-Related Injury  Outcome: Ongoing, Progressing  Intervention: Identify and Manage Contributors  Flowsheets (Taken 3/20/2024 1553)  Self-Care Promotion:   independence encouraged   BADL personal objects within reach  Medication Review/Management: medications reviewed  Intervention: Promote Injury-Free Environment  Flowsheets (Taken 3/20/2024 1553)  Safety Promotion/Fall Prevention:   assistive device/personal item within reach   bed alarm set   nonskid shoes/socks when out of bed   instructed to call staff for mobility   side rails raised x 3     Problem: Infection  Goal: Absence of Infection Signs and Symptoms  Outcome: Ongoing, Progressing  Intervention: Prevent or Manage Infection  Flowsheets (Taken 3/20/2024 1553)  Fever Reduction/Comfort Measures:   lightweight bedding   lightweight clothing  Infection Management: aseptic technique maintained  Isolation Precautions:   contact   precautions maintained

## 2024-03-20 NOTE — PATIENT CARE CONFERENCE
Name: Antonio Galvan II    : 1965 (58 y.o.)  MRN: [id number]            Interdisciplinary Team Conference     Case conference held with patient/family and care team to discuss progress, plan of care, barriers to be addressed for safe return home, equipment recommendations, and discharge planning. Communicated therapy progress with MD, RN, therapy clinicians and case management. All questions/concerns answered.

## 2024-03-21 LAB
ANION GAP SERPL CALC-SCNC: 10 MEQ/L
BASOPHILS # BLD AUTO: 0.02 X10(3)/MCL
BASOPHILS NFR BLD AUTO: 0.3 %
BUN SERPL-MCNC: 48.3 MG/DL (ref 8.4–25.7)
CALCIUM SERPL-MCNC: 8.4 MG/DL (ref 8.4–10.2)
CHLORIDE SERPL-SCNC: 109 MMOL/L (ref 98–107)
CO2 SERPL-SCNC: 19 MMOL/L (ref 22–29)
CREAT SERPL-MCNC: 1.56 MG/DL (ref 0.73–1.18)
CREAT/UREA NIT SERPL: 31
EOSINOPHIL # BLD AUTO: 0.39 X10(3)/MCL (ref 0–0.9)
EOSINOPHIL NFR BLD AUTO: 6.7 %
ERYTHROCYTE [DISTWIDTH] IN BLOOD BY AUTOMATED COUNT: 17.3 % (ref 11.5–17)
GFR SERPLBLD CREATININE-BSD FMLA CKD-EPI: 52 MLS/MIN/1.73/M2
GLUCOSE SERPL-MCNC: 115 MG/DL (ref 70–110)
GLUCOSE SERPL-MCNC: 132 MG/DL (ref 70–110)
GLUCOSE SERPL-MCNC: 197 MG/DL (ref 74–100)
GLUCOSE SERPL-MCNC: 239 MG/DL (ref 70–110)
HCT VFR BLD AUTO: 31.1 % (ref 42–52)
HGB BLD-MCNC: 9.5 G/DL (ref 14–18)
IMM GRANULOCYTES # BLD AUTO: 0.01 X10(3)/MCL (ref 0–0.04)
IMM GRANULOCYTES NFR BLD AUTO: 0.2 %
KOH PREP SPEC: NORMAL
LYMPHOCYTES # BLD AUTO: 1.96 X10(3)/MCL (ref 0.6–4.6)
LYMPHOCYTES NFR BLD AUTO: 33.6 %
MAGNESIUM SERPL-MCNC: 2 MG/DL (ref 1.6–2.6)
MCH RBC QN AUTO: 26.8 PG (ref 27–31)
MCHC RBC AUTO-ENTMCNC: 30.5 G/DL (ref 33–36)
MCV RBC AUTO: 87.9 FL (ref 80–94)
MONOCYTES # BLD AUTO: 0.35 X10(3)/MCL (ref 0.1–1.3)
MONOCYTES NFR BLD AUTO: 6 %
NEUTROPHILS # BLD AUTO: 3.1 X10(3)/MCL (ref 2.1–9.2)
NEUTROPHILS NFR BLD AUTO: 53.2 %
PLATELET # BLD AUTO: 103 X10(3)/MCL (ref 130–400)
PMV BLD AUTO: 10.6 FL (ref 7.4–10.4)
POCT GLUCOSE: 90 MG/DL (ref 70–110)
POTASSIUM SERPL-SCNC: 5.2 MMOL/L (ref 3.5–5.1)
RBC # BLD AUTO: 3.54 X10(6)/MCL (ref 4.7–6.1)
SODIUM SERPL-SCNC: 138 MMOL/L (ref 136–145)
WBC # SPEC AUTO: 5.83 X10(3)/MCL (ref 4.5–11.5)

## 2024-03-21 PROCEDURE — 25000003 PHARM REV CODE 250: Performed by: INTERNAL MEDICINE

## 2024-03-21 PROCEDURE — 27000207 HC ISOLATION

## 2024-03-21 PROCEDURE — 83735 ASSAY OF MAGNESIUM: CPT | Performed by: STUDENT IN AN ORGANIZED HEALTH CARE EDUCATION/TRAINING PROGRAM

## 2024-03-21 PROCEDURE — 25000003 PHARM REV CODE 250: Performed by: STUDENT IN AN ORGANIZED HEALTH CARE EDUCATION/TRAINING PROGRAM

## 2024-03-21 PROCEDURE — 63600175 PHARM REV CODE 636 W HCPCS: Performed by: PHYSICIAN ASSISTANT

## 2024-03-21 PROCEDURE — 80048 BASIC METABOLIC PNL TOTAL CA: CPT | Performed by: PHYSICIAN ASSISTANT

## 2024-03-21 PROCEDURE — 97606 NEG PRS WND THER DME>50 SQCM: CPT

## 2024-03-21 PROCEDURE — 99900035 HC TECH TIME PER 15 MIN (STAT)

## 2024-03-21 PROCEDURE — 63600175 PHARM REV CODE 636 W HCPCS: Performed by: STUDENT IN AN ORGANIZED HEALTH CARE EDUCATION/TRAINING PROGRAM

## 2024-03-21 PROCEDURE — 25000003 PHARM REV CODE 250: Performed by: PHYSICIAN ASSISTANT

## 2024-03-21 PROCEDURE — 97530 THERAPEUTIC ACTIVITIES: CPT

## 2024-03-21 PROCEDURE — 85025 COMPLETE CBC W/AUTO DIFF WBC: CPT | Performed by: PHYSICIAN ASSISTANT

## 2024-03-21 PROCEDURE — 94760 N-INVAS EAR/PLS OXIMETRY 1: CPT

## 2024-03-21 PROCEDURE — 11000004 HC SNF PRIVATE

## 2024-03-21 PROCEDURE — A4216 STERILE WATER/SALINE, 10 ML: HCPCS | Performed by: INTERNAL MEDICINE

## 2024-03-21 RX ADMIN — SODIUM CHLORIDE, PRESERVATIVE FREE 10 ML: 5 INJECTION INTRAVENOUS at 06:03

## 2024-03-21 RX ADMIN — Medication 1 CAPSULE: at 08:03

## 2024-03-21 RX ADMIN — DILTIAZEM HYDROCHLORIDE 30 MG: 30 TABLET, FILM COATED ORAL at 08:03

## 2024-03-21 RX ADMIN — LINEZOLID 600 MG: 600 TABLET, FILM COATED ORAL at 08:03

## 2024-03-21 RX ADMIN — INSULIN ASPART 10 UNITS: 100 INJECTION, SOLUTION INTRAVENOUS; SUBCUTANEOUS at 06:03

## 2024-03-21 RX ADMIN — FERROUS SULFATE TAB 325 MG (65 MG ELEMENTAL FE) 1 EACH: 325 (65 FE) TAB at 08:03

## 2024-03-21 RX ADMIN — LOPERAMIDE HYDROCHLORIDE 4 MG: 2 CAPSULE ORAL at 08:03

## 2024-03-21 RX ADMIN — ONDANSETRON 4 MG: 2 INJECTION INTRAMUSCULAR; INTRAVENOUS at 02:03

## 2024-03-21 RX ADMIN — SODIUM CHLORIDE, PRESERVATIVE FREE 10 ML: 5 INJECTION INTRAVENOUS at 05:03

## 2024-03-21 RX ADMIN — INSULIN ASPART 10 UNITS: 100 INJECTION, SOLUTION INTRAVENOUS; SUBCUTANEOUS at 12:03

## 2024-03-21 RX ADMIN — SODIUM BICARBONATE: 84 INJECTION, SOLUTION INTRAVENOUS at 12:03

## 2024-03-21 RX ADMIN — OXYCODONE HYDROCHLORIDE AND ACETAMINOPHEN 500 MG: 500 TABLET ORAL at 08:03

## 2024-03-21 RX ADMIN — HYDROCODONE BITARTRATE AND ACETAMINOPHEN 1 TABLET: 7.5; 325 TABLET ORAL at 03:03

## 2024-03-21 RX ADMIN — HYDROCODONE BITARTRATE AND ACETAMINOPHEN 1 TABLET: 7.5; 325 TABLET ORAL at 02:03

## 2024-03-21 RX ADMIN — ACETAMINOPHEN 650 MG: 325 TABLET ORAL at 09:03

## 2024-03-21 RX ADMIN — DILTIAZEM HYDROCHLORIDE 30 MG: 30 TABLET, FILM COATED ORAL at 09:03

## 2024-03-21 RX ADMIN — INSULIN ASPART 4 UNITS: 100 INJECTION, SOLUTION INTRAVENOUS; SUBCUTANEOUS at 07:03

## 2024-03-21 RX ADMIN — SODIUM CHLORIDE, PRESERVATIVE FREE 10 ML: 5 INJECTION INTRAVENOUS at 12:03

## 2024-03-21 RX ADMIN — LINEZOLID 600 MG: 600 TABLET, FILM COATED ORAL at 09:03

## 2024-03-21 RX ADMIN — CETIRIZINE HYDROCHLORIDE 10 MG: 10 TABLET, FILM COATED ORAL at 08:03

## 2024-03-21 RX ADMIN — INSULIN ASPART 10 UNITS: 100 INJECTION, SOLUTION INTRAVENOUS; SUBCUTANEOUS at 07:03

## 2024-03-21 RX ADMIN — Medication 1 CAPSULE: at 12:03

## 2024-03-21 RX ADMIN — Medication 1 CAPSULE: at 06:03

## 2024-03-21 RX ADMIN — SITAGLIPTIN 100 MG: 50 TABLET, FILM COATED ORAL at 08:03

## 2024-03-21 RX ADMIN — PANTOPRAZOLE SODIUM 40 MG: 40 TABLET, DELAYED RELEASE ORAL at 08:03

## 2024-03-21 RX ADMIN — INSULIN DETEMIR 15 UNITS: 100 INJECTION, SOLUTION SUBCUTANEOUS at 09:03

## 2024-03-21 NOTE — PROGRESS NOTES
NPWT intact upon arrival - continuous -125mmHg.    Changed vac dressing as ordered, excellent seal obtained.    Will continue with twice weekly dressing changes.  Pt tolerated well.    03/21/24 1644   WOCN Assessment   WOCN Total Time (mins) 60   Visit Date 03/21/24   WOCN Speciality Wound   Procedure wound vac   Intervention changed        Altered Skin Integrity 01/12/23 1530 Sacral spine Full thickness tissue loss with exposed bone, tendon, or muscle. Often includes undermining and tunneling. May extend into muscle and/or supporting structures.   Date First Assessed/Time First Assessed: 01/12/23 1530   Altered Skin Integrity Present on Admission - Did Patient arrive to the hospital with altered skin?: yes  Location: Sacral spine  Description of Altered Skin Integrity: Full thickness tissue los...   Wound Image    Dressing Appearance Intact   Drainage Amount Moderate   Drainage Characteristics/Odor Serosanguineous;Bleeding controlled;No odor   Appearance Pink;Red;Granulating;Tan;Yellow;Slough;Fibrin   Tissue loss description Full thickness   Red (%), Wound Tissue Color 55 %   Yellow (%), Wound Tissue Color 45 %   Periwound Area Denuded   Wound Edges Defined   Wound Length (cm) 8.3 cm   Wound Width (cm) 7.5 cm   Wound Depth (cm) 2.5 cm   Wound Volume (cm^3) 155.625 cm^3   Wound Surface Area (cm^2) 62.25 cm^2   Care Cleansed with:;Antimicrobial agent;Wound cleanser   Dressing Changed;Foam;Transparent film  (black granufoam/drape)   Periwound Care Hydrocolloid barrier applied;Dry periwound area maintained;Skin barrier film applied  (mastisol)   Dressing Change Due 03/25/24        Incision/Site 02/27/24 1450 Right Buttocks lateral   Date First Assessed/Time First Assessed: 02/27/24 1450   Side: Right  Location: Buttocks  Orientation: lateral  Additional Comments: mesalt, quick clot, gauze, abdomen pad, and tape   Wound Image    Dressing Appearance Intact   Drainage Amount Moderate   Drainage Characteristics/Odor  Serosanguineous;No odor;Bleeding controlled   Appearance Red;Granulating;Gray   Red (%), Wound Tissue Color 95 %   Periwound Area Intact   Wound Edges Defined   Wound Length (cm) 6 cm   Wound Width (cm) 12.3 cm   Wound Depth (cm) 1.5 cm   Wound Volume (cm^3) 110.7 cm^3   Wound Surface Area (cm^2) 73.8 cm^2   Undermining (depth (cm)/location) 4 cm at 1 o'clock / from 12 to 1 o'clock   Care Antimicrobial agent;Cleansed with:;Wound cleanser   Dressing Changed;Foam;Transparent film  (black granufoam/drape)   Periwound Care Dry periwound area maintained;Hydrocolloid barrier applied;Skin barrier film applied  (mastisol)   Dressing Change Due 03/25/24        Negative Pressure Wound Therapy  02/29/24 1227   Placement Date/Time: 02/29/24 1227   Location: Sacral Spine   NPWT Type Vacuum Therapy   Therapy Setting NPWT Continuous therapy   Pressure Setting NPWT 125 mmHg   Therapy Interventions NPWT Dressing changed;Seal intact   Sponges Inserted NPWT Black;4   Sponges Removed NPWT Black;4

## 2024-03-21 NOTE — PT/OT/SLP PROGRESS
Physical Therapy Treatment Note           Name: Antonio Galvan II    : 1965 (58 y.o.)  MRN: [id number]           TREATMENT SUMMARY AND RECOMMENDATIONS:    PT Received On: 24  PT Start Time: 1100     PT Stop Time: 1124  PT Total Time (min): 24 min     Subjective Assessment:   No complaints  Lethargic   x Awake, alert, cooperative  Uncooperative    Agitated  c/o pain    Appropriate  c/o fatigue    Confused x Treated at bedside     Emotionally labile  Treated in gym/dept.    Impulsive  Other:    Flat affect       Therapy Precautions:    Cognitive deficits  Spinal precautions    Collar - hard  Sternal precautions    Collar - soft   TLSO    Fall risk  LSO    Hip precautions - posterior  Knee immobilizer    Hip precautions - anterior  WBAT    Impaired communication  Partial weightbearing    Oxygen  TTWB    PEG tube  NWB    Visual deficits x Other: Wound Vac and Super pubic cath    Hearing deficits  x OK 'd to get in chair 2-3 hours per day       Treatment Objectives:     Mobility Training:   Assist level Comments    Bed mobility Total A Rolling side to side for tess placement and lower body dressing    Transfer Total A Tess from bed>Chair; x 3 person assistance for proper positioning in chair    Gait     Sit to stand transitions     Sitting balance     Standing balance      Wheelchair mobility     Car transfer     Other:          Therapeutic Exercise:   Exercise Sets Reps Comments                               Additional Comments:  Extra time needed for proper positioning in WC once OOB. Wound care stating wounds look good but continues to recommend tess vs slide board to reduce sheering.     Will continue caregiver training on tess; trying to reduce from 3 person assistance    Assessment: Patient tolerated session well - up in chair and outside.     PT Plan: Continue per POC  Revisions made to plan of care: no     GOALS:   Multidisciplinary Problems       Physical Therapy Goals           Problem: Physical Therapy    Goal Priority Disciplines Outcome Goal Variances Interventions   Physical Therapy Goal     PT, PT/OT Ongoing, Progressing     Description: Goals to be met by: Discharge     Pt to be seen for ROM tasks 1-2 x/day to ensure proper positioning can be obtained to overall reduce impacts of prolonged bed rest and skin break down    Progress pending healing stages of current wounds    Patient will increase functional independence with mobility by performin. Pt to tolerate PROM tasks to B UE and LE, while obtaining 25% improvement in range.   2. Sitting EOB to tolerance pending wound healing - with pt demonstrating normal BP  3. Progress to OOB into chair once able. - Met  4. Pt to tolerate up to 3 hours in power chair - Met  5. Caregivers to be able to use tess with 1-2 people.                          Skilled PT Minutes Provided: 23   Communication with Treatment Team:     Equipment recommendations:       At end of treatment, patient remained:   Up in chair    x Up in wheelchair in room    In bed    With alarm activated    Bed rails up    Call bell in reach    x Family/friends present    Restraints secured properly    In bathroom with CNA/RN notified   x Nurse aware    In gym with therapist/tech    Other:

## 2024-03-21 NOTE — PT/OT/SLP PROGRESS
Physical Therapy Treatment Note           Name: Antonio Galvan II    : 1965 (58 y.o.)  MRN: [id number]           TREATMENT SUMMARY AND RECOMMENDATIONS:    PT Received On: 24  PT Start Time: 1428     PT Stop Time: 1445  PT Total Time (min): 17 min     Subjective Assessment:   No complaints  Lethargic   x Awake, alert, cooperative  Uncooperative    Agitated  c/o pain    Appropriate  c/o fatigue    Confused x Treated at bedside     Emotionally labile  Treated in gym/dept.    Impulsive  Other:    Flat affect       Therapy Precautions:    Cognitive deficits  Spinal precautions    Collar - hard  Sternal precautions    Collar - soft   TLSO    Fall risk  LSO    Hip precautions - posterior  Knee immobilizer    Hip precautions - anterior  WBAT    Impaired communication  Partial weightbearing    Oxygen  TTWB    PEG tube  NWB    Visual deficits x Other: Wound Vac and Super pubic cath    Hearing deficits  x OK 'd to get in chair 2-3 hours per day       Treatment Objectives:     Mobility Training:   Assist level Comments    Bed mobility Total A Rolling side to side for tess pad removal and doffing pants.  Proper bed positioning completed with pt in supine using pillow for UE and LE to reduce skin break down.    Transfer Total A Tess from chair>bed; x 3 person assistance for proper positioning in chair    Gait     Sit to stand transitions     Sitting balance     Standing balance      Wheelchair mobility     Car transfer     Other:          Therapeutic Exercise:   Exercise Sets Reps Comments                               Additional Comments:  Tess use and positioning post tf is becoming more efficent, requiring less time, but still needing x 3 due to management of wound vac, B LE, cath, positioning as lowering and tess itself.   Pt nauseated asking for medication; nursing made aware and brought IV zofran     Will continue caregiver training on tess    Assessment: Patient tolerated session well , but  still needing 3 person assistance for positioning during tess.     PT Plan: Continue per POC  Revisions made to plan of care: No    GOALS:   Multidisciplinary Problems       Physical Therapy Goals          Problem: Physical Therapy    Goal Priority Disciplines Outcome Goal Variances Interventions   Physical Therapy Goal     PT, PT/OT Ongoing, Progressing     Description: Goals to be met by: Discharge     Pt to be seen for ROM tasks 1-2 x/day to ensure proper positioning can be obtained to overall reduce impacts of prolonged bed rest and skin break down    Progress pending healing stages of current wounds    Patient will increase functional independence with mobility by performin. Pt to tolerate PROM tasks to B UE and LE, while obtaining 25% improvement in range.   2. Sitting EOB to tolerance pending wound healing - with pt demonstrating normal BP  3. Progress to OOB into chair once able. - Met  4. Pt to tolerate up to 3 hours in power chair - Met  5. Caregivers to be able to use tess with 1-2 people.                          Skilled PT Minutes Provided: 17   Communication with Treatment Team:     Equipment recommendations:       At end of treatment, patient remained:   Up in chair     Up in wheelchair in room   x In bed    With alarm activated    Bed rails up   x Call bell in reach    x Family/friends present    Restraints secured properly    In bathroom with CNA/RN notified   x Nurse aware    In gym with therapist/tech    Other:

## 2024-03-21 NOTE — PLAN OF CARE
Problem: Adult Inpatient Plan of Care  Goal: Plan of Care Review  Outcome: Ongoing, Progressing  Flowsheets (Taken 3/21/2024 1535)  Plan of Care Reviewed With: patient  Goal: Patient-Specific Goal (Individualized)  Outcome: Ongoing, Progressing  Flowsheets (Taken 3/21/2024 1535)  Anxieties, Fears or Concerns: none at this time  Individualized Care Needs: monitor HR and BP, pain management, monitor wound care and wound vac, change wound vac dressing  Goal: Absence of Hospital-Acquired Illness or Injury  Outcome: Ongoing, Progressing  Intervention: Identify and Manage Fall Risk  Flowsheets (Taken 3/21/2024 1535)  Safety Promotion/Fall Prevention:   assistive device/personal item within reach   bed alarm set   family to remain at bedside   instructed to call staff for mobility   room near unit station  Intervention: Prevent Skin Injury  Flowsheets (Taken 3/21/2024 1535)  Body Position: sitting up in bed  Skin Protection:   adhesive use limited   incontinence pads utilized   transparent dressing maintained   tubing/devices free from skin contact   skin sealant/moisture barrier applied   pectin skin barriers applied  Intervention: Prevent and Manage VTE (Venous Thromboembolism) Risk  Flowsheets (Taken 3/21/2024 1535)  Activity Management: Up in chair - L3  VTE Prevention/Management:   bleeding precations maintained   bleeding risk assessed   fluids promoted   intravenous hydration  Range of Motion: ROM (range of motion) performed  Intervention: Prevent Infection  Flowsheets (Taken 3/21/2024 1535)  Infection Prevention:   cohorting utilized   equipment surfaces disinfected   hand hygiene promoted  Goal: Optimal Comfort and Wellbeing  Outcome: Ongoing, Progressing  Intervention: Monitor Pain and Promote Comfort  Flowsheets (Taken 3/21/2024 1535)  Pain Management Interventions:   care clustered   relaxation techniques promoted   quiet environment facilitated  Intervention: Provide Person-Centered Care  Flowsheets (Taken  3/21/2024 1535)  Trust Relationship/Rapport:   care explained   choices provided   questions encouraged   questions answered  Goal: Readiness for Transition of Care  Outcome: Ongoing, Progressing  Intervention: Mutually Develop Transition Plan  Flowsheets (Taken 3/21/2024 1535)  Communicated SADE with patient/caregiver: Date not available/Unable to determine     Problem: Diabetes Comorbidity  Goal: Blood Glucose Level Within Targeted Range  Outcome: Ongoing, Progressing  Intervention: Monitor and Manage Glycemia  Flowsheets (Taken 3/21/2024 1535)  Glycemic Management:   blood glucose monitored   supplemental insulin given   oral hydration promoted     Problem: Skin Injury Risk Increased  Goal: Skin Health and Integrity  Outcome: Ongoing, Progressing  Intervention: Optimize Skin Protection  Flowsheets (Taken 3/21/2024 1535)  Pressure Reduction Techniques:   frequent weight shift encouraged   heels elevated off bed   weight shift assistance provided   positioned off wounds   pressure points protected   sit time limited to 2 hours  Pressure Reduction Devices:   heel offloading device utilized   positioning supports utilized  Skin Protection:   adhesive use limited   incontinence pads utilized   transparent dressing maintained   tubing/devices free from skin contact   skin sealant/moisture barrier applied   pectin skin barriers applied  Head of Bed (HOB) Positioning: HOB at 20-30 degrees  Intervention: Promote and Optimize Oral Intake  Flowsheets (Taken 3/21/2024 1535)  Oral Nutrition Promotion:   calorie-dense foods provided   calorie-dense liquids provided   physical activity promoted     Problem: Impaired Wound Healing  Goal: Optimal Wound Healing  Outcome: Ongoing, Progressing  Intervention: Promote Wound Healing  Flowsheets (Taken 3/21/2024 1535)  Oral Nutrition Promotion:   calorie-dense foods provided   calorie-dense liquids provided   physical activity promoted  Sleep/Rest Enhancement:   awakenings minimized    regular sleep/rest pattern promoted  Activity Management: Up in chair - L3  Pain Management Interventions:   care clustered   relaxation techniques promoted   quiet environment facilitated     Problem: Fall Injury Risk  Goal: Absence of Fall and Fall-Related Injury  Outcome: Ongoing, Progressing  Intervention: Identify and Manage Contributors  Flowsheets (Taken 3/21/2024 1535)  Self-Care Promotion:   independence encouraged   BADL personal objects within reach  Medication Review/Management: medications reviewed  Intervention: Promote Injury-Free Environment  Flowsheets (Taken 3/21/2024 1535)  Safety Promotion/Fall Prevention:   assistive device/personal item within reach   bed alarm set   family to remain at bedside   instructed to call staff for mobility   room near unit station     Problem: Infection  Goal: Absence of Infection Signs and Symptoms  Outcome: Ongoing, Progressing  Intervention: Prevent or Manage Infection  Flowsheets (Taken 3/21/2024 1535)  Fever Reduction/Comfort Measures:   lightweight bedding   lightweight clothing  Infection Management: aseptic technique maintained

## 2024-03-21 NOTE — PROGRESS NOTES
[facility]  [facility] MEDICINE ~ PROGRESS NOTE  CHIEF COMPLAINT     Hospital follow up for nonhealing wound    HOSPITAL COURSE     58-year-old man with quadriplegic spinal paralysis and numerous decubitus ulcers who is followed by wound care is brought in today due to fever. He had wound cultures done 3 days ago that have grown Proteus mirabilis and Klebsiella pneumoniae. Sensitivities are not complete. The wounds were noted to have significant odor and heavy green drainage. In addition the patient had some nausea and vomiting and home health noted that his blood pressure was dipping. He was advised to come to the emergency room. Wound cultures were drawn from the right hip wound. His wife reports that when the wound is pressed above pus comes out. Both Klebsiella and Proteus maybe treated with fluoroquinolones which are both IV and oral. The patient's wife preferred if the patient came home so that she could attend to his wounds however the patient expressed a desire to be admitted.     Today:  No reported complaints today by patient.  Tolerated decreased dose diltiazem yesterday.    OBJECTIVE/PHYSICAL EXAM     VITAL SIGNS (MOST RECENT):  Temp: 98.2 °F (36.8 °C) (03/20/24 2106)  Pulse: 79 (03/20/24 2106)  Resp: 18 (03/21/24 0313)  BP: 118/78 (03/21/24 0826)  SpO2: 100 % (03/20/24 2106) VITAL SIGNS (24 HOUR RANGE):  Temp:  [98.2 °F (36.8 °C)-98.3 °F (36.8 °C)] 98.2 °F (36.8 °C)  Pulse:  [] 79  Resp:  [18] 18  SpO2:  [96 %-100 %] 100 %  BP: ()/(60-78) 118/78     GENERAL: In no acute distress, afebrile  HEENT:  PERRLA  CHEST: Clear to auscultation bilaterally  HEART: S1, S2, no appreciable murmur  ABDOMEN: Soft, nontender, BS +  MSK: Warm, no lower extremity edema, no clubbing or cyanosis  NEUROLOGIC: Alert and oriented x4  INTEGUMENTARY:  See media for wounds    ASSESSMENT/PLAN     Nonhealing chronic wound   VRE, Proteus, Pseudomonas, Klebsiella  S/p debridement with   Ted on 02/27/2024  Zyvox and Tobramycin - will need 6 weeks of treatment given exposed bone 03/02/2024-04/12/2024  Wound VAC and wound care  Pre-albumin now wnl   Appreciate dietary recommendations for supplements     Atrial Fibrillation  Hypertension/Hypotension   Diltiazem started 3/16/2024 - dose decreased to 30mg BID on 03/20/2024 due to hypotension  No AC 2/2 patient request as he has has prior episodes of acute blood loss from wounds - aspirin d/c 02/28/2024  Hypertensive episodes are associated with pain    Post op anemia   H&H improved s/p 2u pRBC 02/28/2024 and 1u pRBC on 02/29/2024  No anticoagulation at this time - aspirin d/c 02/28/2024     Hyperglycemia in setting of DM  A1c 02/23/2024 8.5%  Reports taking 80u Levemir BID  Continue home Sitagliptin  Continue titrating insulin regimen as needed    GISSEL/Hyperkalemia  0.45NS with 5-mEq sodium bicarb at 100cc/hr x1L   30g Kayexalate today     Thrombocytopenia   Peripheral smear     Left upper extremity swelling  Improved today, ultrasound without DVT     Hx of: Neurogenic bladder, Quadriplegia     DVT prophylaxis:  SCDs, history of bleeding in the recent past    Anticipated discharge and disposition: home with [facility] when antibiotics are complete   __________________________________________________________________________    LABS/MICRO/MEDS/DIAGNOSTICS     LABS  Recent Labs     03/21/24  0508      K 5.2*   CHLORIDE 109*   CO2 19*   BUN 48.3*   CREATININE 1.56*   GLUCOSE 197*   CALCIUM 8.4     Recent Labs     03/21/24  0508   WBC 5.83   RBC 3.54*   HCT 31.1*   MCV 87.9   *     MICROBIOLOGY  Microbiology Results (last 7 days)       Procedure Component Value Units Date/Time    Direct Prep (Skin, Hair, Nails) [[id number]] Collected: 03/20/24 1413    Order Status: Completed Specimen: Skin Updated: 03/21/24 0152     KOH Prep No fungal elements seen             MEDICATIONS   acetaminophen  650 mg Oral QHS    ascorbic acid (vitamin C)  500 mg Oral  Daily    cetirizine  10 mg Oral Daily    collagenase   Topical (Top) Daily    diltiaZEM  30 mg Oral Q12H    ferrous sulfate  1 tablet Oral Daily    fluconazole  200 mg Oral Weekly    insulin aspart U-100  10 Units Subcutaneous TIDWM    insulin detemir U-100  15 Units Subcutaneous QHS    Lactobacillus acidophilus  1 capsule Oral TID WM    linezolid  600 mg Oral Q12H    loperamide  4 mg Oral Daily    miconazole   Topical (Top) BID    pantoprazole  40 mg Oral Daily    SITagliptin phosphate  100 mg Oral Daily    sodium chloride 0.9%  10 mL Intravenous Q6H    [START ON 3/22/2024] tobramycin IV (PEDS and ADULTS)  360 mg Intravenous Q48H         INFUSIONS         DIAGNOSTIC TESTS  US Upper Extremity Veins Left   Final Result      No venous thrombosis identified.         Electronically signed by: Andrade Perez   Date:    03/18/2024   Time:    21:08      X-Ray Chest 1 View   Final Result      Left PICC tip overlies the mid SVC.         Electronically signed by: Oj Solomon   Date:    02/28/2024   Time:    14:29         EF   Date Value Ref Range Status   05/09/2023 60 % Final   07/18/2022 40 % Final        NUTRITION STATUS  Patient meets ASPEN criteria for   malnutrition of   per RD assessment as evidenced by:                       A minimum of two characteristics is recommended for diagnosis of either severe or non-severe malnutrition.     Case related differential diagnoses have been reviewed; assessment and plan has been documented. I have personally reviewed the labs and test results that are currently available; I have reviewed the patients medication list. I have reviewed the consulting providers recommendations. I have reviewed or attempted to review medical records based upon their availability.  All of the patient's and/or family's questions have been addressed and answered to the best of my ability.  I will continue to monitor closely and make adjustments to medical management as needed.  This document was created  using M*Modal Fluency Direct.  Transcription errors may have been made.  Please contact me if any questions may rise regarding documentation to clarify transcription.      ANTHONY Vazquez   Internal Medicine  Department of Hospital Medicine [facility] Surgical Unit

## 2024-03-21 NOTE — PLAN OF CARE
Problem: Adult Inpatient Plan of Care  Goal: Plan of Care Review  Outcome: Ongoing, Progressing  Flowsheets (Taken 3/21/2024 0205)  Plan of Care Reviewed With:   patient   family  Goal: Patient-Specific Goal (Individualized)  Outcome: Ongoing, Progressing  Flowsheets (Taken 3/21/2024 0205)  Anxieties, Fears or Concerns: none at this time  Individualized Care Needs: Monitor HR and BP, Pain management, Monitor Wound care and Wound Vac, Maintain Suprapubic catheter  Goal: Absence of Hospital-Acquired Illness or Injury  Outcome: Ongoing, Progressing  Intervention: Prevent and Manage VTE (Venous Thromboembolism) Risk  Flowsheets (Taken 3/20/2024 2000)  VTE Prevention/Management:   bleeding precations maintained   bleeding risk assessed   fluids promoted   ROM (passive) performed  Goal: Optimal Comfort and Wellbeing  Outcome: Ongoing, Progressing     Problem: Diabetes Comorbidity  Goal: Blood Glucose Level Within Targeted Range  Outcome: Ongoing, Progressing

## 2024-03-21 NOTE — NURSING
Nurses Note -- 4 Eyes      3/21/2024   7:14 AM      Skin assessed during: Daily Assessment      [] No Altered Skin Integrity Present    []Prevention Measures Documented      [x] Yes- Altered Skin Integrity Present or Discovered   [] LDA Added if Not in Epic (Describe Wound)   [x] New Altered Skin Integrity was Present on Admit and Documented in LDA   [x] Wound Image Taken    Wound Care Consulted? No    Attending Nurse:  Consuelo Aburto LPN     Second RN/Staff Member:  Shivam Maza RN

## 2024-03-22 LAB
ANION GAP SERPL CALC-SCNC: 9 MEQ/L
BUN SERPL-MCNC: 50.1 MG/DL (ref 8.4–25.7)
CALCIUM SERPL-MCNC: 8.4 MG/DL (ref 8.4–10.2)
CHLORIDE SERPL-SCNC: 109 MMOL/L (ref 98–107)
CO2 SERPL-SCNC: 20 MMOL/L (ref 22–29)
CREAT SERPL-MCNC: 1.56 MG/DL (ref 0.73–1.18)
CREAT/UREA NIT SERPL: 32
GFR SERPLBLD CREATININE-BSD FMLA CKD-EPI: 52 MLS/MIN/1.73/M2
GLUCOSE SERPL-MCNC: 146 MG/DL (ref 70–110)
GLUCOSE SERPL-MCNC: 169 MG/DL (ref 74–100)
HEMATOLOGIST REVIEW: NORMAL
POCT GLUCOSE: 157 MG/DL (ref 70–110)
POCT GLUCOSE: 191 MG/DL (ref 70–110)
POCT GLUCOSE: 93 MG/DL (ref 70–110)
POTASSIUM SERPL-SCNC: 4.9 MMOL/L (ref 3.5–5.1)
SODIUM SERPL-SCNC: 138 MMOL/L (ref 136–145)

## 2024-03-22 PROCEDURE — A4216 STERILE WATER/SALINE, 10 ML: HCPCS | Performed by: INTERNAL MEDICINE

## 2024-03-22 PROCEDURE — 99900035 HC TECH TIME PER 15 MIN (STAT)

## 2024-03-22 PROCEDURE — 25000003 PHARM REV CODE 250: Performed by: STUDENT IN AN ORGANIZED HEALTH CARE EDUCATION/TRAINING PROGRAM

## 2024-03-22 PROCEDURE — 63600175 PHARM REV CODE 636 W HCPCS: Performed by: STUDENT IN AN ORGANIZED HEALTH CARE EDUCATION/TRAINING PROGRAM

## 2024-03-22 PROCEDURE — 25000003 PHARM REV CODE 250: Performed by: PHYSICIAN ASSISTANT

## 2024-03-22 PROCEDURE — 25000003 PHARM REV CODE 250: Performed by: INTERNAL MEDICINE

## 2024-03-22 PROCEDURE — 27000207 HC ISOLATION

## 2024-03-22 PROCEDURE — 80048 BASIC METABOLIC PNL TOTAL CA: CPT | Performed by: PHYSICIAN ASSISTANT

## 2024-03-22 PROCEDURE — 94760 N-INVAS EAR/PLS OXIMETRY 1: CPT

## 2024-03-22 PROCEDURE — 63600175 PHARM REV CODE 636 W HCPCS: Performed by: PHYSICIAN ASSISTANT

## 2024-03-22 PROCEDURE — 11000004 HC SNF PRIVATE

## 2024-03-22 PROCEDURE — 97530 THERAPEUTIC ACTIVITIES: CPT

## 2024-03-22 RX ORDER — DILTIAZEM HYDROCHLORIDE 30 MG/1
30 TABLET, FILM COATED ORAL EVERY 8 HOURS
Status: DISCONTINUED | OUTPATIENT
Start: 2024-03-22 | End: 2024-03-25

## 2024-03-22 RX ORDER — TALC
9 POWDER (GRAM) TOPICAL NIGHTLY PRN
Status: DISCONTINUED | OUTPATIENT
Start: 2024-03-22 | End: 2024-04-09 | Stop reason: HOSPADM

## 2024-03-22 RX ORDER — SODIUM CHLORIDE 9 MG/ML
INJECTION, SOLUTION INTRAVENOUS CONTINUOUS
Status: ACTIVE | OUTPATIENT
Start: 2024-03-22 | End: 2024-03-23

## 2024-03-22 RX ADMIN — DILTIAZEM HYDROCHLORIDE 30 MG: 30 TABLET, FILM COATED ORAL at 01:03

## 2024-03-22 RX ADMIN — INSULIN ASPART 10 UNITS: 100 INJECTION, SOLUTION INTRAVENOUS; SUBCUTANEOUS at 08:03

## 2024-03-22 RX ADMIN — LOPERAMIDE HYDROCHLORIDE 4 MG: 2 CAPSULE ORAL at 09:03

## 2024-03-22 RX ADMIN — INSULIN ASPART 2 UNITS: 100 INJECTION, SOLUTION INTRAVENOUS; SUBCUTANEOUS at 06:03

## 2024-03-22 RX ADMIN — SODIUM CHLORIDE, PRESERVATIVE FREE 10 ML: 5 INJECTION INTRAVENOUS at 11:03

## 2024-03-22 RX ADMIN — DILTIAZEM HYDROCHLORIDE 30 MG: 30 TABLET, FILM COATED ORAL at 09:03

## 2024-03-22 RX ADMIN — SODIUM CHLORIDE, PRESERVATIVE FREE 10 ML: 5 INJECTION INTRAVENOUS at 06:03

## 2024-03-22 RX ADMIN — Medication 1 CAPSULE: at 12:03

## 2024-03-22 RX ADMIN — FERROUS SULFATE TAB 325 MG (65 MG ELEMENTAL FE) 1 EACH: 325 (65 FE) TAB at 09:03

## 2024-03-22 RX ADMIN — CETIRIZINE HYDROCHLORIDE 10 MG: 10 TABLET, FILM COATED ORAL at 09:03

## 2024-03-22 RX ADMIN — LINEZOLID 600 MG: 600 TABLET, FILM COATED ORAL at 09:03

## 2024-03-22 RX ADMIN — ACETAMINOPHEN 650 MG: 325 TABLET ORAL at 09:03

## 2024-03-22 RX ADMIN — PANTOPRAZOLE SODIUM 40 MG: 40 TABLET, DELAYED RELEASE ORAL at 09:03

## 2024-03-22 RX ADMIN — TOBRAMYCIN SULFATE 360 MG: 40 INJECTION, SOLUTION INTRAMUSCULAR; INTRAVENOUS at 09:03

## 2024-03-22 RX ADMIN — SODIUM CHLORIDE: 9 INJECTION, SOLUTION INTRAVENOUS at 11:03

## 2024-03-22 RX ADMIN — INSULIN ASPART 10 UNITS: 100 INJECTION, SOLUTION INTRAVENOUS; SUBCUTANEOUS at 05:03

## 2024-03-22 RX ADMIN — SODIUM CHLORIDE, PRESERVATIVE FREE 10 ML: 5 INJECTION INTRAVENOUS at 05:03

## 2024-03-22 RX ADMIN — MICONAZOLE NITRATE: 20 CREAM TOPICAL at 09:03

## 2024-03-22 RX ADMIN — SITAGLIPTIN 100 MG: 50 TABLET, FILM COATED ORAL at 09:03

## 2024-03-22 RX ADMIN — Medication 1 CAPSULE: at 08:03

## 2024-03-22 RX ADMIN — OXYCODONE HYDROCHLORIDE AND ACETAMINOPHEN 500 MG: 500 TABLET ORAL at 09:03

## 2024-03-22 RX ADMIN — Medication 1 CAPSULE: at 05:03

## 2024-03-22 RX ADMIN — SODIUM CHLORIDE: 9 INJECTION, SOLUTION INTRAVENOUS at 08:03

## 2024-03-22 RX ADMIN — SODIUM CHLORIDE, PRESERVATIVE FREE 10 ML: 5 INJECTION INTRAVENOUS at 12:03

## 2024-03-22 RX ADMIN — SODIUM CHLORIDE: 9 INJECTION, SOLUTION INTRAVENOUS at 09:03

## 2024-03-22 RX ADMIN — HYDROCODONE BITARTRATE AND ACETAMINOPHEN 1 TABLET: 7.5; 325 TABLET ORAL at 01:03

## 2024-03-22 NOTE — PLAN OF CARE
Problem: Adult Inpatient Plan of Care  Goal: Plan of Care Review  Outcome: Ongoing, Progressing  Flowsheets (Taken 3/22/2024 1443)  Plan of Care Reviewed With:   patient   mother   caregiver  Goal: Patient-Specific Goal (Individualized)  Outcome: Ongoing, Progressing  Flowsheets (Taken 3/22/2024 1443)  Anxieties, Fears or Concerns: blood sugar low before lunch  Individualized Care Needs: snack given before lunch/lunch insulin helf, monitor cbg's, monitor bp and hr, wound care/wound vac, IV abt and IVF, monitor electrolytes  Goal: Absence of Hospital-Acquired Illness or Injury  Outcome: Ongoing, Progressing  Intervention: Identify and Manage Fall Risk  Flowsheets (Taken 3/22/2024 1443)  Safety Promotion/Fall Prevention:   assistive device/personal item within reach   family to remain at bedside   Fall Risk reviewed with patient/family   Fall Risk signage in place   room near unit station  Intervention: Prevent Skin Injury  Flowsheets (Taken 3/22/2024 1443)  Body Position:   heels elevated   turned  Skin Protection:   adhesive use limited   incontinence pads utilized   tubing/devices free from skin contact  Intervention: Prevent and Manage VTE (Venous Thromboembolism) Risk  Flowsheets (Taken 3/22/2024 1443)  Activity Management: Rolling - L1  VTE Prevention/Management:   bleeding precations maintained   bleeding risk assessed  Intervention: Prevent Infection  Flowsheets (Taken 3/22/2024 1443)  Infection Prevention:   hand hygiene promoted   single patient room provided  Goal: Optimal Comfort and Wellbeing  Outcome: Ongoing, Progressing  Intervention: Monitor Pain and Promote Comfort  Flowsheets (Taken 3/22/2024 1443)  Pain Management Interventions:   medication offered but refused   quiet environment facilitated   relaxation techniques promoted   pillow support provided   position adjusted  Intervention: Provide Person-Centered Care  Flowsheets (Taken 3/22/2024 1443)  Trust Relationship/Rapport:   care explained    choices provided   questions answered   questions encouraged   reassurance provided  Goal: Readiness for Transition of Care  Outcome: Ongoing, Progressing     Problem: Diabetes Comorbidity  Goal: Blood Glucose Level Within Targeted Range  Outcome: Ongoing, Progressing  Intervention: Monitor and Manage Glycemia  Flowsheets (Taken 3/22/2024 1443)  Glycemic Management:   blood glucose monitored   supplemental insulin given     Problem: Skin Injury Risk Increased  Goal: Skin Health and Integrity  Outcome: Ongoing, Progressing  Intervention: Optimize Skin Protection  Flowsheets (Taken 3/22/2024 1443)  Pressure Reduction Techniques:   frequent weight shift encouraged   heels elevated off bed   positioned off wounds   pressure points protected   rest period provided between sit times   weight shift assistance provided  Pressure Reduction Devices:   heel offloading device utilized   positioning supports utilized   specialty bed utilized  Skin Protection:   adhesive use limited   incontinence pads utilized   tubing/devices free from skin contact  Head of Bed (HOB) Positioning: HOB at 30-45 degrees  Intervention: Promote and Optimize Oral Intake  Flowsheets (Taken 3/22/2024 1443)  Oral Nutrition Promotion:   calorie-dense foods provided   rest periods promoted     Problem: Impaired Wound Healing  Goal: Optimal Wound Healing  Outcome: Ongoing, Progressing  Intervention: Promote Wound Healing  Flowsheets (Taken 3/22/2024 1443)  Oral Nutrition Promotion:   calorie-dense foods provided   rest periods promoted  Sleep/Rest Enhancement:   regular sleep/rest pattern promoted   relaxation techniques promoted  Activity Management: Rolling - L1  Pain Management Interventions:   medication offered but refused   quiet environment facilitated   relaxation techniques promoted   pillow support provided   position adjusted     Problem: Fall Injury Risk  Goal: Absence of Fall and Fall-Related Injury  Outcome: Ongoing,  Progressing  Intervention: Identify and Manage Contributors  Flowsheets (Taken 3/22/2024 1443)  Medication Review/Management: medications reviewed  Intervention: Promote Injury-Free Environment  Flowsheets (Taken 3/22/2024 1443)  Safety Promotion/Fall Prevention:   assistive device/personal item within reach   family to remain at bedside   Fall Risk reviewed with patient/family   Fall Risk signage in place   room near unit station     Problem: Infection  Goal: Absence of Infection Signs and Symptoms  Outcome: Ongoing, Progressing  Intervention: Prevent or Manage Infection  Flowsheets (Taken 3/22/2024 1443)  Infection Management: aseptic technique maintained  Isolation Precautions:   contact   precautions maintained

## 2024-03-22 NOTE — PLAN OF CARE
Problem: Adult Inpatient Plan of Care  Goal: Plan of Care Review  Outcome: Ongoing, Progressing  Flowsheets (Taken 3/22/2024 0441)  Plan of Care Reviewed With:   patient   family  Goal: Patient-Specific Goal (Individualized)  Outcome: Ongoing, Progressing  Flowsheets (Taken 3/22/2024 0441)  Anxieties, Fears or Concerns: none at this time  Individualized Care Needs: Monitor HR and BP, Pain management, Monitor wound care and wound vac, Monitor CBGs and administer supplemental insulin as ordered  Goal: Absence of Hospital-Acquired Illness or Injury  Outcome: Ongoing, Progressing  Intervention: Prevent and Manage VTE (Venous Thromboembolism) Risk  Flowsheets (Taken 3/21/2024 2000)  VTE Prevention/Management:   bleeding precations maintained   bleeding risk assessed   dorsiflexion/plantar flexion performed   fluids promoted   intravenous hydration   ROM (passive) performed  Intervention: Prevent Infection  Flowsheets (Taken 3/21/2024 2000)  Infection Prevention:   hand hygiene promoted   personal protective equipment utilized   rest/sleep promoted   single patient room provided  Goal: Optimal Comfort and Wellbeing  Outcome: Ongoing, Progressing

## 2024-03-22 NOTE — PT/OT/SLP PROGRESS
Physical Therapy Treatment Note           Name: Antonio Galvan II    : 1965 (58 y.o.)  MRN: [id number]           TREATMENT SUMMARY AND RECOMMENDATIONS:    PT Received On: 24  PT Start Time: 1116     PT Stop Time: 1140  PT Total Time (min): 24 min     Subjective Assessment:   No complaints  Lethargic   x Awake, alert, cooperative  Uncooperative    Agitated  c/o pain    Appropriate  c/o fatigue    Confused x Treated at bedside     Emotionally labile  Treated in gym/dept.    Impulsive  Other:    Flat affect       Therapy Precautions:    Cognitive deficits  Spinal precautions    Collar - hard  Sternal precautions    Collar - soft   TLSO    Fall risk  LSO    Hip precautions - posterior  Knee immobilizer    Hip precautions - anterior  WBAT    Impaired communication  Partial weightbearing    Oxygen  TTWB    PEG tube  NWB    Visual deficits x Other: Wound Vac and Super pubic cath    Hearing deficits  x OK 'd to get in chair 2-3 hours per day       Treatment Objectives:     Mobility Training:   Assist level Comments    Bed mobility Total A Rolling side to side for tess placement and lower body dressing    Transfer Total A Tess from Bed>Chair; x 3 person assistance for proper positioning in chair - Due to 0/5 mm activation 1 person has to guide/protect feet, one maintains wound vac and catheter and one guides the tess.    Gait     Sit to stand transitions     Sitting balance     Standing balance      Wheelchair mobility     Car transfer     Other:          Therapeutic Exercise:   Exercise Sets Reps Comments                               Additional Comments:  Extra time needed for proper positioning in WC once OOB. Wound care stating wounds look good but continues to recommend tess vs slide board to reduce sheering.     Will continue caregiver training on tess; trying to reduce from 3 person assistance    Assessment: Patient tolerated session well - up in chair and outside.     PT Plan: Continue  per POC  Revisions made to plan of care: no     GOALS:   Multidisciplinary Problems       Physical Therapy Goals          Problem: Physical Therapy    Goal Priority Disciplines Outcome Goal Variances Interventions   Physical Therapy Goal     PT, PT/OT Ongoing, Progressing     Description: Goals to be met by: Discharge     Pt to be seen for ROM tasks 1-2 x/day to ensure proper positioning can be obtained to overall reduce impacts of prolonged bed rest and skin break down    Progress pending healing stages of current wounds    Patient will increase functional independence with mobility by performin. Pt to tolerate PROM tasks to B UE and LE, while obtaining 25% improvement in range.   2. Sitting EOB to tolerance pending wound healing - with pt demonstrating normal BP  3. Progress to OOB into chair once able. - Met  4. Pt to tolerate up to 3 hours in power chair - Met  5. Caregivers to be able to use tess with 1-2 people.                          Skilled PT Minutes Provided: 23   Communication with Treatment Team:     Equipment recommendations:       At end of treatment, patient remained:   Up in chair    x Up in wheelchair in room    In bed    With alarm activated    Bed rails up    Call bell in reach    x Family/friends present    Restraints secured properly    In bathroom with CNA/RN notified   x Nurse aware    In gym with therapist/tech    Other:

## 2024-03-22 NOTE — PROGRESS NOTES
[facility]  [facility] MEDICINE ~ PROGRESS NOTE  CHIEF COMPLAINT     Hospital follow up for nonhealing wound    HOSPITAL COURSE     58-year-old man with quadriplegic spinal paralysis and numerous decubitus ulcers who is followed by wound care is brought in today due to fever. He had wound cultures done 3 days ago that have grown Proteus mirabilis and Klebsiella pneumoniae. Sensitivities are not complete. The wounds were noted to have significant odor and heavy green drainage. In addition the patient had some nausea and vomiting and home health noted that his blood pressure was dipping. He was advised to come to the emergency room. Wound cultures were drawn from the right hip wound. His wife reports that when the wound is pressed above pus comes out. Both Klebsiella and Proteus maybe treated with fluoroquinolones which are both IV and oral. The patient's wife preferred if the patient came home so that she could attend to his wounds however the patient expressed a desire to be admitted.     Today:  No reported complaints today.  Potassium improved, renal function remains elevated.  IV fluids today.  Increasing diltiazem to 30 mg Q 8 today.    OBJECTIVE/PHYSICAL EXAM     VITAL SIGNS (MOST RECENT):  Temp: 97.9 °F (36.6 °C) (03/22/24 0648)  Pulse: (!) 124 (03/22/24 0745)  Resp: 18 (03/22/24 0745)  BP: 133/81 (03/22/24 0648)  SpO2: 99 % (03/22/24 0745) VITAL SIGNS (24 HOUR RANGE):  Temp:  [97.6 °F (36.4 °C)-97.9 °F (36.6 °C)] 97.9 °F (36.6 °C)  Pulse:  [] 124  Resp:  [18] 18  SpO2:  [98 %-100 %] 99 %  BP: (120-133)/(76-88) 133/81     GENERAL: In no acute distress, afebrile  HEENT:  PERRLA  CHEST: Clear to auscultation bilaterally  HEART: S1, S2, no appreciable murmur  ABDOMEN: Soft, nontender, BS +  MSK: Warm, no lower extremity edema, no clubbing or cyanosis  NEUROLOGIC: Alert and oriented x4  INTEGUMENTARY:  See media for wounds    ASSESSMENT/PLAN     Nonhealing chronic wound   VRE,  Proteus, Pseudomonas, Klebsiella  S/p debridement with Dr. Jean on 02/27/2024  Zyvox and Tobramycin - will need 6 weeks of treatment given exposed bone 03/02/2024-04/12/2024  Wound VAC and wound care  Pre-albumin now wnl   Appreciate dietary recommendations for supplements     Atrial Fibrillation  Hypertension/Hypotension   Diltiazem started 3/16/2024 - dose decreased to 30mg BID on 03/20/2024, increased to 30mg Q8 today  No AC 2/2 patient request as he has has prior episodes of acute blood loss from wounds - aspirin d/c 02/28/2024  Hypertensive episodes are associated with pain    Post op anemia   H&H improved s/p 2u pRBC 02/28/2024 and 1u pRBC on 02/29/2024  No anticoagulation at this time - aspirin d/c 02/28/2024     Hyperglycemia in setting of DM  A1c 02/23/2024 8.5%  Reports taking 80u Levemir BID  Continue home Sitagliptin  Continue titrating insulin regimen as needed    GISSEL/Hyperkalemia  IV fluids today     Thrombocytopenia   Peripheral smear without significant findings     Left upper extremity swelling  Ultrasound without DVT     Hx of: Neurogenic bladder, Quadriplegia     DVT prophylaxis:  SCDs, history of bleeding in the recent past    Anticipated discharge and disposition: home with [facility] when antibiotics are complete   __________________________________________________________________________    LABS/MICRO/MEDS/DIAGNOSTICS     LABS  Recent Labs     03/22/24  0542      K 4.9   CHLORIDE 109*   CO2 20*   BUN 50.1*   CREATININE 1.56*   GLUCOSE 169*   CALCIUM 8.4     Recent Labs     03/21/24  0508   WBC 5.83   RBC 3.54*   HCT 31.1*   MCV 87.9   *     MICROBIOLOGY  Microbiology Results (last 7 days)       Procedure Component Value Units Date/Time    Direct Prep (Skin, Hair, Nails) [[id number]] Collected: 03/20/24 1413    Order Status: Completed Specimen: Skin Updated: 03/21/24 0152     KOH Prep No fungal elements seen             MEDICATIONS   acetaminophen  650 mg Oral QHS    ascorbic acid  (vitamin C)  500 mg Oral Daily    cetirizine  10 mg Oral Daily    collagenase   Topical (Top) Daily    diltiaZEM  30 mg Oral Q12H    ferrous sulfate  1 tablet Oral Daily    fluconazole  200 mg Oral Weekly    insulin aspart U-100  10 Units Subcutaneous TIDWM    insulin detemir U-100  15 Units Subcutaneous QHS    Lactobacillus acidophilus  1 capsule Oral TID WM    linezolid  600 mg Oral Q12H    loperamide  4 mg Oral Daily    miconazole   Topical (Top) BID    pantoprazole  40 mg Oral Daily    SITagliptin phosphate  100 mg Oral Daily    sodium chloride 0.9%  10 mL Intravenous Q6H    sodium polystyrene sulfonate  30 g Oral Once    tobramycin IV (PEDS and ADULTS)  360 mg Intravenous Q48H         INFUSIONS   sodium chloride 0.9% 100 mL/hr at 03/22/24 0952          DIAGNOSTIC TESTS  US Upper Extremity Veins Left   Final Result      No venous thrombosis identified.         Electronically signed by: Andrade Perez   Date:    03/18/2024   Time:    21:08      X-Ray Chest 1 View   Final Result      Left PICC tip overlies the mid SVC.         Electronically signed by: Oj Solomon   Date:    02/28/2024   Time:    14:29         EF   Date Value Ref Range Status   05/09/2023 60 % Final   07/18/2022 40 % Final        NUTRITION STATUS  Patient meets ASPEN criteria for   malnutrition of   per RD assessment as evidenced by:                       A minimum of two characteristics is recommended for diagnosis of either severe or non-severe malnutrition.     Case related differential diagnoses have been reviewed; assessment and plan has been documented. I have personally reviewed the labs and test results that are currently available; I have reviewed the patients medication list. I have reviewed the consulting providers recommendations. I have reviewed or attempted to review medical records based upon their availability.  All of the patient's and/or family's questions have been addressed and answered to the best of my ability.  I will  continue to monitor closely and make adjustments to medical management as needed.  This document was created using M*Modal Fluency Direct.  Transcription errors may have been made.  Please contact me if any questions may rise regarding documentation to clarify transcription.      ANTHONY Vazquez   Internal Medicine  Department of Hospital Medicine [facility]

## 2024-03-22 NOTE — PT/OT/SLP PROGRESS
Physical Therapy Treatment Note           Name: Antonio Galvan II    : 1965 (58 y.o.)  MRN: [id number]           TREATMENT SUMMARY AND RECOMMENDATIONS:    PT Received On: 24  PT Start Time: 1435     PT Stop Time: 1505  PT Total Time (min): 30 min     Subjective Assessment:   No complaints  Lethargic   x Awake, alert, cooperative  Uncooperative    Agitated  c/o pain    Appropriate  c/o fatigue    Confused x Treated at bedside     Emotionally labile  Treated in gym/dept.    Impulsive  Other:    Flat affect       Therapy Precautions:    Cognitive deficits  Spinal precautions    Collar - hard  Sternal precautions    Collar - soft   TLSO    Fall risk  LSO    Hip precautions - posterior  Knee immobilizer    Hip precautions - anterior  WBAT    Impaired communication  Partial weightbearing    Oxygen  TTWB    PEG tube  NWB    Visual deficits x Other: Wound Vac and Super pubic cath    Hearing deficits  x OK 'd to get in chair 2-3 hours per day       Treatment Objectives:     Mobility Training:   Assist level Comments    Bed mobility Total A Rolling side to side for tess pad removal and doffing pants and cleaning tasks due to BM accident.  Proper bed positioning completed with pt in supine using pillow for UE and LE to reduce skin break down.    Transfer Total A Tess from chair>bed; x 2 person assistance for maintenance of cath, wound vac and B LE   Gait     Sit to stand transitions     Sitting balance     Standing balance      Wheelchair mobility     Car transfer     Other:          Therapeutic Exercise:   Exercise Sets Reps Comments                               Additional Comments:  Extra time needed for proper positioning and cleaning tasks once back in bed. Nursing brought in to ensure BM did not get in Wound Vac. Everything was clear.   Skilled tx needed due to complexity of wound vac, wound assessment, caregiver training and proper positioning needed to reduce worsening of current condition.      Will continue caregiver training on tess    Assessment: Patient tolerated session well , but still needing 2-3 person assistance for positioning during tess.     PT Plan: Continue per POC  Revisions made to plan of care: No    GOALS:   Multidisciplinary Problems       Physical Therapy Goals          Problem: Physical Therapy    Goal Priority Disciplines Outcome Goal Variances Interventions   Physical Therapy Goal     PT, PT/OT Ongoing, Progressing     Description: Goals to be met by: Discharge     Pt to be seen for ROM tasks 1-2 x/day to ensure proper positioning can be obtained to overall reduce impacts of prolonged bed rest and skin break down    Progress pending healing stages of current wounds    Patient will increase functional independence with mobility by performin. Pt to tolerate PROM tasks to B UE and LE, while obtaining 25% improvement in range.   2. Sitting EOB to tolerance pending wound healing - with pt demonstrating normal BP  3. Progress to OOB into chair once able. - Met  4. Pt to tolerate up to 3 hours in power chair - Met  5. Caregivers to be able to use tess with 1-2 people.                          Skilled PT Minutes Provided: 25   Communication with Treatment Team:     Equipment recommendations:       At end of treatment, patient remained:   Up in chair     Up in wheelchair in room   x In bed    With alarm activated    Bed rails up   x Call bell in reach    x Family/friends present    Restraints secured properly    In bathroom with CNA/RN notified   x Nurse aware    In gym with therapist/tech    Other:

## 2024-03-22 NOTE — PLAN OF CARE
[facility]  Discharge Reassessment    Primary Care Provider: Jennifer Fernando NP    Expected Discharge Date:     Reassessment (most recent)       Discharge Reassessment - 03/22/24 5515          Discharge Reassessment    Assessment Type Discharge Planning Reassessment     Did the patient's condition or plan change since previous assessment? No     Discharge Plan discussed with: Patient     Communicated SADE with patient/caregiver Date not available/Unable to determine     Discharge Plan A Home Health;Home with family     DME Needed Upon Discharge  none     Transition of Care Barriers None     Why the patient remains in the hospital Requires continued medical care        Post-Acute Status    Post-Acute Authorization Home Health     Home Health Status Pending medical clearance/testing     Hospital Resources/Appts/Education Provided Provided patient/caregiver with written discharge plan information     Discharge Delays None known at this time

## 2024-03-22 NOTE — PLAN OF CARE
Weekly Staffing Report      Date Admitted: 2/26/2024 :   Staffing Date: 3/22/2024     Patient Active Problem List   Diagnosis    Uncontrolled type 2 diabetes mellitus with hyperglycemia    Atrial flutter    Constipation    Abnormal urinalysis    Obstructive sleep apnea syndrome    Acute deep vein thrombosis (DVT) of brachial vein of right upper extremity    Quadriplegia    Intolerance of continuous positive airway pressure (CPAP) ventilation    Complex sleep apnea syndrome    Open wound of left hip    Pressure injury of right ischium, stage 4    Osteomyelitis    Sacral decubitus ulcer, stage II    Chronic blood loss anemia    Infected pressure ulcer, unspecified pressure ulcer stage          Team Members Present:       Nursing Present     Yes [x]    No []    Physical Therapy Present    Yes [x]    No []    Occupational Therapy Present     Yes [x]    No []    Speech Therapy Present    Yes []    No []    NA [x]    Dietary Present    Yes [x]    No []        Physician Present     [x] Thairy Reyes    [] Jeanette Mann    [x] ANTHONY Howe    []       Family Present    [] Adult Children    [] Spouse    [] POA    [] Friend/ Caregiver    [] Other       Interdisciplinary Meeting Notes:  Mother at bedside. PT/Ot- ROM exercises. Appetite- good. Blood sugars and BP under control except for at 6pm BP drops and meds being held at that time due to parameters. Abx until 04/16. Plan to discharge home with home health when ready. All questions answered at this time.                 Please see Documented PT/OT/ST, Dietary, Nursing, and Physician notes for detailed treatment information.

## 2024-03-23 LAB
ANION GAP SERPL CALC-SCNC: 11 MEQ/L
BUN SERPL-MCNC: 45 MG/DL (ref 8.4–25.7)
CALCIUM SERPL-MCNC: 8.4 MG/DL (ref 8.4–10.2)
CHLORIDE SERPL-SCNC: 111 MMOL/L (ref 98–107)
CO2 SERPL-SCNC: 20 MMOL/L (ref 22–29)
CREAT SERPL-MCNC: 1.43 MG/DL (ref 0.73–1.18)
CREAT/UREA NIT SERPL: 31
GFR SERPLBLD CREATININE-BSD FMLA CKD-EPI: 58 MLS/MIN/1.73/M2
GLUCOSE SERPL-MCNC: 103 MG/DL (ref 70–110)
GLUCOSE SERPL-MCNC: 112 MG/DL (ref 70–110)
GLUCOSE SERPL-MCNC: 133 MG/DL (ref 74–100)
GLUCOSE SERPL-MCNC: 145 MG/DL (ref 70–110)
GLUCOSE SERPL-MCNC: 165 MG/DL (ref 70–110)
GLUCOSE SERPL-MCNC: 168 MG/DL (ref 70–110)
POTASSIUM SERPL-SCNC: 5.1 MMOL/L (ref 3.5–5.1)
SODIUM SERPL-SCNC: 142 MMOL/L (ref 136–145)

## 2024-03-23 PROCEDURE — 63600175 PHARM REV CODE 636 W HCPCS: Performed by: PHYSICIAN ASSISTANT

## 2024-03-23 PROCEDURE — 25000003 PHARM REV CODE 250: Performed by: INTERNAL MEDICINE

## 2024-03-23 PROCEDURE — 80048 BASIC METABOLIC PNL TOTAL CA: CPT | Performed by: PHYSICIAN ASSISTANT

## 2024-03-23 PROCEDURE — 25000003 PHARM REV CODE 250: Performed by: STUDENT IN AN ORGANIZED HEALTH CARE EDUCATION/TRAINING PROGRAM

## 2024-03-23 PROCEDURE — 99900035 HC TECH TIME PER 15 MIN (STAT)

## 2024-03-23 PROCEDURE — A4216 STERILE WATER/SALINE, 10 ML: HCPCS | Performed by: INTERNAL MEDICINE

## 2024-03-23 PROCEDURE — 97110 THERAPEUTIC EXERCISES: CPT

## 2024-03-23 PROCEDURE — 63600175 PHARM REV CODE 636 W HCPCS: Performed by: INTERNAL MEDICINE

## 2024-03-23 PROCEDURE — 27000207 HC ISOLATION

## 2024-03-23 PROCEDURE — 11000004 HC SNF PRIVATE

## 2024-03-23 PROCEDURE — 63600175 PHARM REV CODE 636 W HCPCS: Performed by: STUDENT IN AN ORGANIZED HEALTH CARE EDUCATION/TRAINING PROGRAM

## 2024-03-23 PROCEDURE — 25000003 PHARM REV CODE 250: Performed by: PHYSICIAN ASSISTANT

## 2024-03-23 PROCEDURE — 94760 N-INVAS EAR/PLS OXIMETRY 1: CPT

## 2024-03-23 RX ORDER — INSULIN ASPART 100 [IU]/ML
0-10 INJECTION, SOLUTION INTRAVENOUS; SUBCUTANEOUS
Status: DISCONTINUED | OUTPATIENT
Start: 2024-03-23 | End: 2024-04-09 | Stop reason: HOSPADM

## 2024-03-23 RX ORDER — GLUCAGON 1 MG
1 KIT INJECTION
Status: DISCONTINUED | OUTPATIENT
Start: 2024-03-23 | End: 2024-04-09 | Stop reason: HOSPADM

## 2024-03-23 RX ORDER — IBUPROFEN 200 MG
16 TABLET ORAL
Status: DISCONTINUED | OUTPATIENT
Start: 2024-03-23 | End: 2024-04-09 | Stop reason: HOSPADM

## 2024-03-23 RX ORDER — IBUPROFEN 200 MG
24 TABLET ORAL
Status: DISCONTINUED | OUTPATIENT
Start: 2024-03-23 | End: 2024-04-09 | Stop reason: HOSPADM

## 2024-03-23 RX ADMIN — DILTIAZEM HYDROCHLORIDE 30 MG: 30 TABLET, FILM COATED ORAL at 02:03

## 2024-03-23 RX ADMIN — SODIUM CHLORIDE, PRESERVATIVE FREE 10 ML: 5 INJECTION INTRAVENOUS at 11:03

## 2024-03-23 RX ADMIN — LINEZOLID 600 MG: 600 TABLET, FILM COATED ORAL at 09:03

## 2024-03-23 RX ADMIN — MICONAZOLE NITRATE: 20 CREAM TOPICAL at 09:03

## 2024-03-23 RX ADMIN — INSULIN ASPART 2 UNITS: 100 INJECTION, SOLUTION INTRAVENOUS; SUBCUTANEOUS at 09:03

## 2024-03-23 RX ADMIN — ACETAMINOPHEN 650 MG: 325 TABLET ORAL at 09:03

## 2024-03-23 RX ADMIN — HYDROCODONE BITARTRATE AND ACETAMINOPHEN 1 TABLET: 7.5; 325 TABLET ORAL at 11:03

## 2024-03-23 RX ADMIN — INSULIN ASPART 2 UNITS: 100 INJECTION, SOLUTION INTRAVENOUS; SUBCUTANEOUS at 12:03

## 2024-03-23 RX ADMIN — Medication 1 CAPSULE: at 07:03

## 2024-03-23 RX ADMIN — SITAGLIPTIN 100 MG: 50 TABLET, FILM COATED ORAL at 09:03

## 2024-03-23 RX ADMIN — CETIRIZINE HYDROCHLORIDE 10 MG: 10 TABLET, FILM COATED ORAL at 09:03

## 2024-03-23 RX ADMIN — Medication 1 CAPSULE: at 05:03

## 2024-03-23 RX ADMIN — OXYCODONE HYDROCHLORIDE AND ACETAMINOPHEN 500 MG: 500 TABLET ORAL at 09:03

## 2024-03-23 RX ADMIN — SODIUM CHLORIDE, PRESERVATIVE FREE 10 ML: 5 INJECTION INTRAVENOUS at 06:03

## 2024-03-23 RX ADMIN — ONDANSETRON 4 MG: 2 INJECTION INTRAMUSCULAR; INTRAVENOUS at 02:03

## 2024-03-23 RX ADMIN — INSULIN DETEMIR 15 UNITS: 100 INJECTION, SOLUTION SUBCUTANEOUS at 09:03

## 2024-03-23 RX ADMIN — DILTIAZEM HYDROCHLORIDE 30 MG: 30 TABLET, FILM COATED ORAL at 06:03

## 2024-03-23 RX ADMIN — SODIUM CHLORIDE, PRESERVATIVE FREE 10 ML: 5 INJECTION INTRAVENOUS at 05:03

## 2024-03-23 RX ADMIN — PANTOPRAZOLE SODIUM 40 MG: 40 TABLET, DELAYED RELEASE ORAL at 09:03

## 2024-03-23 RX ADMIN — DILTIAZEM HYDROCHLORIDE 30 MG: 30 TABLET, FILM COATED ORAL at 09:03

## 2024-03-23 RX ADMIN — Medication 1 CAPSULE: at 11:03

## 2024-03-23 RX ADMIN — LOPERAMIDE HYDROCHLORIDE 4 MG: 2 CAPSULE ORAL at 09:03

## 2024-03-23 RX ADMIN — FERROUS SULFATE TAB 325 MG (65 MG ELEMENTAL FE) 1 EACH: 325 (65 FE) TAB at 09:03

## 2024-03-23 NOTE — PLAN OF CARE
Problem: Adult Inpatient Plan of Care  Goal: Patient-Specific Goal (Individualized)  Outcome: Ongoing, Progressing  Flowsheets (Taken 3/22/2024 1930)  Anxieties, Fears or Concerns: None expressed  Individualized Care Needs: IV antibiotics, Wound care, Monitor blood glucose  Goal: Optimal Comfort and Wellbeing  Outcome: Ongoing, Progressing  Intervention: Monitor Pain and Promote Comfort  Flowsheets (Taken 3/22/2024 1930)  Pain Management Interventions:   around-the-clock dosing utilized   care clustered   quiet environment facilitated     Problem: Diabetes Comorbidity  Goal: Blood Glucose Level Within Targeted Range  Outcome: Ongoing, Progressing     Problem: Impaired Wound Healing  Goal: Optimal Wound Healing  Outcome: Ongoing, Progressing  Intervention: Promote Wound Healing  Flowsheets (Taken 3/22/2024 1930)  Pain Management Interventions:   around-the-clock dosing utilized   care clustered   quiet environment facilitated

## 2024-03-23 NOTE — PLAN OF CARE
Problem: Adult Inpatient Plan of Care  Goal: Plan of Care Review  Outcome: Ongoing, Progressing  Flowsheets (Taken 3/23/2024 1029)  Plan of Care Reviewed With:   patient   mother  Goal: Patient-Specific Goal (Individualized)  Outcome: Ongoing, Progressing  Flowsheets (Taken 3/23/2024 1029)  Anxieties, Fears or Concerns: elevated potassium doesnt understand why it's high now  Individualized Care Needs: IV abt, wound care, turn q 2, monitor cbg/admin insulin  Goal: Absence of Hospital-Acquired Illness or Injury  Outcome: Ongoing, Progressing  Intervention: Identify and Manage Fall Risk  Flowsheets (Taken 3/23/2024 1029)  Safety Promotion/Fall Prevention:   assistive device/personal item within reach   Fall Risk signage in place   Fall Risk reviewed with patient/family   family to remain at bedside   medications reviewed   room near unit station  Intervention: Prevent Skin Injury  Flowsheets (Taken 3/23/2024 1029)  Body Position:   turned   sitting up in bed  Skin Protection:   adhesive use limited   tubing/devices free from skin contact   skin sealant/moisture barrier applied   incontinence pads utilized  Intervention: Prevent and Manage VTE (Venous Thromboembolism) Risk  Flowsheets (Taken 3/23/2024 1029)  Activity Management: Rolling - L1  VTE Prevention/Management:   bleeding precations maintained   bleeding risk assessed  Intervention: Prevent Infection  Flowsheets (Taken 3/23/2024 1029)  Infection Prevention:   hand hygiene promoted   cohorting utilized   single patient room provided   personal protective equipment utilized   equipment surfaces disinfected   environmental surveillance performed   rest/sleep promoted  Goal: Optimal Comfort and Wellbeing  Outcome: Ongoing, Progressing  Intervention: Monitor Pain and Promote Comfort  Flowsheets (Taken 3/23/2024 1029)  Pain Management Interventions:   care clustered   medication offered but refused   quiet environment facilitated   relaxation techniques promoted    position adjusted   pillow support provided  Intervention: Provide Person-Centered Care  Flowsheets (Taken 3/23/2024 1029)  Trust Relationship/Rapport:   care explained   choices provided   reassurance provided   questions encouraged  Goal: Readiness for Transition of Care  Outcome: Ongoing, Progressing     Problem: Diabetes Comorbidity  Goal: Blood Glucose Level Within Targeted Range  Outcome: Ongoing, Progressing  Intervention: Monitor and Manage Glycemia  Flowsheets (Taken 3/23/2024 1029)  Glycemic Management:   blood glucose monitored   supplemental insulin given     Problem: Skin Injury Risk Increased  Goal: Skin Health and Integrity  Outcome: Ongoing, Progressing  Intervention: Optimize Skin Protection  Flowsheets (Taken 3/23/2024 1029)  Pressure Reduction Techniques:   positioned off wounds   weight shift assistance provided  Pressure Reduction Devices:   positioning supports utilized   heel offloading device utilized   specialty bed utilized   pressure-redistributing mattress utilized  Skin Protection:   adhesive use limited   tubing/devices free from skin contact   skin sealant/moisture barrier applied   incontinence pads utilized  Head of Bed (HOB) Positioning: HOB at 30 degrees     Problem: Impaired Wound Healing  Goal: Optimal Wound Healing  Outcome: Ongoing, Progressing  Intervention: Promote Wound Healing  Flowsheets (Taken 3/23/2024 1029)  Oral Nutrition Promotion:   calorie-dense foods provided   calorie-dense liquids provided   safe use of adaptive equipment encouraged  Sleep/Rest Enhancement: regular sleep/rest pattern promoted  Activity Management: Rolling - L1  Pain Management Interventions:   care clustered   medication offered but refused   quiet environment facilitated   relaxation techniques promoted   position adjusted   pillow support provided     Problem: Fall Injury Risk  Goal: Absence of Fall and Fall-Related Injury  Outcome: Ongoing, Progressing  Intervention: Identify and Manage  Contributors  Flowsheets (Taken 3/23/2024 1029)  Medication Review/Management: medications reviewed  Intervention: Promote Injury-Free Environment  Flowsheets (Taken 3/23/2024 1029)  Safety Promotion/Fall Prevention:   assistive device/personal item within reach   Fall Risk signage in place   Fall Risk reviewed with patient/family   family to remain at bedside   medications reviewed   room near unit station     Problem: Infection  Goal: Absence of Infection Signs and Symptoms  Outcome: Ongoing, Progressing  Intervention: Prevent or Manage Infection  Flowsheets (Taken 3/23/2024 1029)  Isolation Precautions:   contact   precautions maintained

## 2024-03-23 NOTE — PROGRESS NOTES
[facility]  [facility] MEDICINE ~ PROGRESS NOTE  CHIEF COMPLAINT     Hospital follow up for nonhealing wound    HOSPITAL COURSE     58-year-old man with quadriplegic spinal paralysis and numerous decubitus ulcers who is followed by wound care is brought in today due to fever. He had wound cultures done 3 days ago that have grown Proteus mirabilis and Klebsiella pneumoniae. Sensitivities are not complete. The wounds were noted to have significant odor and heavy green drainage. In addition the patient had some nausea and vomiting and home health noted that his blood pressure was dipping. He was advised to come to the emergency room. Wound cultures were drawn from the right hip wound. His wife reports that when the wound is pressed above pus comes out. Both Klebsiella and Proteus maybe treated with fluoroquinolones which are both IV and oral. The patient's wife preferred if the patient came home so that she could attend to his wounds however the patient expressed a desire to be admitted.     Today:  Blood sugars running low we will do see the scheduled preprandial and put on sliding scale.    OBJECTIVE/PHYSICAL EXAM     VITAL SIGNS (MOST RECENT):  Temp: 97.8 °F (36.6 °C) (03/22/24 2125)  Pulse: 109 (03/23/24 0620)  Resp: 18 (03/22/24 2125)  BP: 111/75 (03/23/24 0620)  SpO2: 99 % (03/22/24 2125) VITAL SIGNS (24 HOUR RANGE):  Temp:  [97.7 °F (36.5 °C)-97.8 °F (36.6 °C)] 97.8 °F (36.6 °C)  Pulse:  [109-125] 109  Resp:  [18] 18  SpO2:  [98 %-99 %] 99 %  BP: (105-125)/(74-81) 111/75     GENERAL: In no acute distress, afebrile  HEENT:  PERRLA  CHEST: Clear to auscultation bilaterally  HEART: S1, S2, no appreciable murmur  ABDOMEN: Soft, nontender, BS +  MSK: Warm, no lower extremity edema, no clubbing or cyanosis  NEUROLOGIC: Alert and oriented x4  INTEGUMENTARY:  See media for wounds    ASSESSMENT/PLAN     Nonhealing chronic wound   VRE, Proteus, Pseudomonas, Klebsiella  S/p debridement with  Dr. Jean on 02/27/2024  Zyvox and Tobramycin - will need 6 weeks of treatment given exposed bone 03/02/2024-04/12/2024  Wound VAC and wound care  Pre-albumin now wnl   Appreciate dietary recommendations for supplements     Atrial Fibrillation  Hypertension/Hypotension   Diltiazem started 3/16/2024 - dose decreased to 30mg BID on 03/20/2024, increased to 30mg Q8 today  No AC 2/2 patient request as he has has prior episodes of acute blood loss from wounds - aspirin d/c 02/28/2024  Hypertensive episodes are associated with pain    Post op anemia   H&H improved s/p 2u pRBC 02/28/2024 and 1u pRBC on 02/29/2024  No anticoagulation at this time - aspirin d/c 02/28/2024     Hyperglycemia in setting of DM  A1c 02/23/2024 8.5%  Reports taking 80u Levemir BID  Continue home Sitagliptin  Continue titrating insulin regimen as needed    GISSEL/Hyperkalemia  IV fluids today     Thrombocytopenia   Peripheral smear without significant findings     Left upper extremity swelling  Ultrasound without DVT     Hx of: Neurogenic bladder, Quadriplegia     DVT prophylaxis:  SCDs, history of bleeding in the recent past    Anticipated discharge and disposition: home with [facility] when antibiotics are complete   __________________________________________________________________________    LABS/MICRO/MEDS/DIAGNOSTICS     LABS  Recent Labs     03/23/24  0522      K 5.1   CHLORIDE 111*   CO2 20*   BUN 45.0*   CREATININE 1.43*   GLUCOSE 133*   CALCIUM 8.4     Recent Labs     03/21/24  0508   WBC 5.83   RBC 3.54*   HCT 31.1*   MCV 87.9   *     MICROBIOLOGY  Microbiology Results (last 7 days)       Procedure Component Value Units Date/Time    Direct Prep (Skin, Hair, Nails) [[id number]] Collected: 03/20/24 1413    Order Status: Completed Specimen: Skin Updated: 03/21/24 0152     KOH Prep No fungal elements seen             MEDICATIONS   acetaminophen  650 mg Oral QHS    ascorbic acid (vitamin C)  500 mg Oral Daily    cetirizine  10 mg Oral  Daily    collagenase   Topical (Top) Daily    diltiaZEM  30 mg Oral Q8H    ferrous sulfate  1 tablet Oral Daily    fluconazole  200 mg Oral Weekly    insulin aspart U-100  10 Units Subcutaneous TIDWM    insulin detemir U-100  15 Units Subcutaneous QHS    Lactobacillus acidophilus  1 capsule Oral TID WM    linezolid  600 mg Oral Q12H    loperamide  4 mg Oral Daily    miconazole   Topical (Top) BID    pantoprazole  40 mg Oral Daily    SITagliptin phosphate  100 mg Oral Daily    sodium chloride 0.9%  10 mL Intravenous Q6H    sodium polystyrene sulfonate  30 g Oral Once    tobramycin IV (PEDS and ADULTS)  360 mg Intravenous Q48H         INFUSIONS           DIAGNOSTIC TESTS  US Upper Extremity Veins Left   Final Result      No venous thrombosis identified.         Electronically signed by: Andrade Perez   Date:    03/18/2024   Time:    21:08      X-Ray Chest 1 View   Final Result      Left PICC tip overlies the mid SVC.         Electronically signed by: Oj Solomon   Date:    02/28/2024   Time:    14:29         EF   Date Value Ref Range Status   05/09/2023 60 % Final   07/18/2022 40 % Final        NUTRITION STATUS  Patient meets ASPEN criteria for   malnutrition of   per RD assessment as evidenced by:                       A minimum of two characteristics is recommended for diagnosis of either severe or non-severe malnutrition.     Case related differential diagnoses have been reviewed; assessment and plan has been documented. I have personally reviewed the labs and test results that are currently available; I have reviewed the patients medication list. I have reviewed the consulting providers recommendations. I have reviewed or attempted to review medical records based upon their availability.  All of the patient's and/or family's questions have been addressed and answered to the best of my ability.  I will continue to monitor closely and make adjustments to medical management as needed.  This document was created using  M*Modal Fluency Direct.  Transcription errors may have been made.  Please contact me if any questions may rise regarding documentation to clarify transcription.      Jeanette Mann MD   Internal Medicine  Department of Hospital Medicine [facility] - [facility] Surgical Unit

## 2024-03-23 NOTE — PT/OT/SLP PROGRESS
Physical Therapy Treatment Note           Name: Antonio Galvan II    : 1965 (58 y.o.)  MRN: [id number]           TREATMENT SUMMARY AND RECOMMENDATIONS:    PT Received On: 24  PT Start Time: 0800     PT Stop Time: 0830  PT Total Time (min): 30 min     Subjective Assessment:   No complaints  Lethargic   x Awake, alert, cooperative  Uncooperative    Agitated  c/o pain    Appropriate  c/o fatigue    Confused x Treated at bedside     Emotionally labile  Treated in gym/dept.    Impulsive  Other:    Flat affect       Therapy Precautions:    Cognitive deficits  Spinal precautions    Collar - hard  Sternal precautions    Collar - soft   TLSO    Fall risk  LSO    Hip precautions - posterior  Knee immobilizer    Hip precautions - anterior  WBAT    Impaired communication  Partial weightbearing    Oxygen  TTWB    PEG tube  NWB    Visual deficits x Other: Wound Vac and Super pubic cath    Hearing deficits  x OK 'd to get in chair 2-3 hours per day       Treatment Objectives:     Mobility Training:   Assist level Comments    Bed mobility     Transfer     Gait     Sit to stand transitions     Sitting balance     Standing balance      Wheelchair mobility     Car transfer     Other:          Therapeutic Exercise:   Exercise Sets Reps Comments   PROM to B UE and LE                            Additional Comments:      Assessment: Patient tolerated session well with PROM performed to B UE and LE with severe tone and joint contractures limiting movement    PT Plan: Continue per POC  Revisions made to plan of care: No    GOALS:   Multidisciplinary Problems       Physical Therapy Goals          Problem: Physical Therapy    Goal Priority Disciplines Outcome Goal Variances Interventions   Physical Therapy Goal     PT, PT/OT Ongoing, Progressing     Description: Goals to be met by: Discharge     Pt to be seen for ROM tasks 1-2 x/day to ensure proper positioning can be obtained to overall reduce impacts of prolonged  bed rest and skin break down    Progress pending healing stages of current wounds    Patient will increase functional independence with mobility by performin. Pt to tolerate PROM tasks to B UE and LE, while obtaining 25% improvement in range.   2. Sitting EOB to tolerance pending wound healing - with pt demonstrating normal BP  3. Progress to OOB into chair once able. - Met  4. Pt to tolerate up to 3 hours in power chair - Met  5. Caregivers to be able to use tess with 1-2 people.                          Skilled PT Minutes Provided: 30   Communication with Treatment Team:     Equipment recommendations:       At end of treatment, patient remained:   Up in chair     Up in wheelchair in room   x In bed    With alarm activated    Bed rails up   x Call bell in reach    x Family/friends present    Restraints secured properly    In bathroom with CNA/RN notified   x Nurse aware    In gym with therapist/tech    Other:

## 2024-03-24 LAB
ALBUMIN SERPL-MCNC: 2.3 G/DL (ref 3.5–5)
ALBUMIN/GLOB SERPL: 0.7 RATIO (ref 1.1–2)
ALP SERPL-CCNC: 61 UNIT/L (ref 40–150)
ALT SERPL-CCNC: 10 UNIT/L (ref 0–55)
AST SERPL-CCNC: 6 UNIT/L (ref 5–34)
BILIRUB SERPL-MCNC: 0.2 MG/DL
BUN SERPL-MCNC: 42.9 MG/DL (ref 8.4–25.7)
CALCIUM SERPL-MCNC: 8.4 MG/DL (ref 8.4–10.2)
CHLORIDE SERPL-SCNC: 110 MMOL/L (ref 98–107)
CO2 SERPL-SCNC: 20 MMOL/L (ref 22–29)
CREAT SERPL-MCNC: 1.56 MG/DL (ref 0.73–1.18)
GFR SERPLBLD CREATININE-BSD FMLA CKD-EPI: 52 MLS/MIN/1.73/M2
GLOBULIN SER-MCNC: 3.2 GM/DL (ref 2.4–3.5)
GLUCOSE SERPL-MCNC: 122 MG/DL (ref 70–110)
GLUCOSE SERPL-MCNC: 123 MG/DL (ref 70–110)
GLUCOSE SERPL-MCNC: 136 MG/DL (ref 74–100)
GLUCOSE SERPL-MCNC: 138 MG/DL (ref 70–110)
GLUCOSE SERPL-MCNC: 144 MG/DL (ref 70–110)
GLUCOSE SERPL-MCNC: 99 MG/DL (ref 70–110)
POTASSIUM SERPL-SCNC: 5.2 MMOL/L (ref 3.5–5.1)
PROT SERPL-MCNC: 5.5 GM/DL (ref 6.4–8.3)
SODIUM SERPL-SCNC: 139 MMOL/L (ref 136–145)
TOBRAMYCIN TROUGH SERPL-MCNC: 1.9 UG/ML (ref 0–2)

## 2024-03-24 PROCEDURE — 25000003 PHARM REV CODE 250: Performed by: PHYSICIAN ASSISTANT

## 2024-03-24 PROCEDURE — 94760 N-INVAS EAR/PLS OXIMETRY 1: CPT

## 2024-03-24 PROCEDURE — A4216 STERILE WATER/SALINE, 10 ML: HCPCS | Performed by: INTERNAL MEDICINE

## 2024-03-24 PROCEDURE — 80053 COMPREHEN METABOLIC PANEL: CPT | Performed by: STUDENT IN AN ORGANIZED HEALTH CARE EDUCATION/TRAINING PROGRAM

## 2024-03-24 PROCEDURE — 27000207 HC ISOLATION

## 2024-03-24 PROCEDURE — 11000004 HC SNF PRIVATE

## 2024-03-24 PROCEDURE — 80200 ASSAY OF TOBRAMYCIN: CPT | Performed by: STUDENT IN AN ORGANIZED HEALTH CARE EDUCATION/TRAINING PROGRAM

## 2024-03-24 PROCEDURE — 63600175 PHARM REV CODE 636 W HCPCS: Performed by: PHYSICIAN ASSISTANT

## 2024-03-24 PROCEDURE — 99900035 HC TECH TIME PER 15 MIN (STAT)

## 2024-03-24 PROCEDURE — 63600175 PHARM REV CODE 636 W HCPCS: Performed by: STUDENT IN AN ORGANIZED HEALTH CARE EDUCATION/TRAINING PROGRAM

## 2024-03-24 PROCEDURE — 25000003 PHARM REV CODE 250: Performed by: INTERNAL MEDICINE

## 2024-03-24 PROCEDURE — 25000003 PHARM REV CODE 250: Performed by: STUDENT IN AN ORGANIZED HEALTH CARE EDUCATION/TRAINING PROGRAM

## 2024-03-24 RX ADMIN — SITAGLIPTIN 100 MG: 50 TABLET, FILM COATED ORAL at 08:03

## 2024-03-24 RX ADMIN — DILTIAZEM HYDROCHLORIDE 30 MG: 30 TABLET, FILM COATED ORAL at 02:03

## 2024-03-24 RX ADMIN — MICONAZOLE NITRATE: 20 CREAM TOPICAL at 08:03

## 2024-03-24 RX ADMIN — LINEZOLID 600 MG: 600 TABLET, FILM COATED ORAL at 08:03

## 2024-03-24 RX ADMIN — ACETAMINOPHEN 650 MG: 325 TABLET ORAL at 08:03

## 2024-03-24 RX ADMIN — SODIUM CHLORIDE, PRESERVATIVE FREE 10 ML: 5 INJECTION INTRAVENOUS at 11:03

## 2024-03-24 RX ADMIN — DILTIAZEM HYDROCHLORIDE 30 MG: 30 TABLET, FILM COATED ORAL at 05:03

## 2024-03-24 RX ADMIN — Medication 1 CAPSULE: at 12:03

## 2024-03-24 RX ADMIN — INSULIN DETEMIR 15 UNITS: 100 INJECTION, SOLUTION SUBCUTANEOUS at 08:03

## 2024-03-24 RX ADMIN — LOPERAMIDE HYDROCHLORIDE 4 MG: 2 CAPSULE ORAL at 08:03

## 2024-03-24 RX ADMIN — PANTOPRAZOLE SODIUM 40 MG: 40 TABLET, DELAYED RELEASE ORAL at 08:03

## 2024-03-24 RX ADMIN — CETIRIZINE HYDROCHLORIDE 10 MG: 10 TABLET, FILM COATED ORAL at 08:03

## 2024-03-24 RX ADMIN — DILTIAZEM HYDROCHLORIDE 30 MG: 30 TABLET, FILM COATED ORAL at 10:03

## 2024-03-24 RX ADMIN — FERROUS SULFATE TAB 325 MG (65 MG ELEMENTAL FE) 1 EACH: 325 (65 FE) TAB at 08:03

## 2024-03-24 RX ADMIN — TOBRAMYCIN SULFATE 300 MG: 40 INJECTION, SOLUTION INTRAMUSCULAR; INTRAVENOUS at 03:03

## 2024-03-24 RX ADMIN — OXYCODONE HYDROCHLORIDE AND ACETAMINOPHEN 500 MG: 500 TABLET ORAL at 08:03

## 2024-03-24 RX ADMIN — Medication 1 CAPSULE: at 08:03

## 2024-03-24 RX ADMIN — ONDANSETRON 4 MG: 2 INJECTION INTRAMUSCULAR; INTRAVENOUS at 06:03

## 2024-03-24 RX ADMIN — Medication 1 CAPSULE: at 05:03

## 2024-03-24 RX ADMIN — SODIUM CHLORIDE, PRESERVATIVE FREE 10 ML: 5 INJECTION INTRAVENOUS at 05:03

## 2024-03-24 NOTE — PROGRESS NOTES
[facility]  [facility] MEDICINE ~ PROGRESS NOTE  CHIEF COMPLAINT     Hospital follow up for nonhealing wound    HOSPITAL COURSE     58-year-old man with quadriplegic spinal paralysis and numerous decubitus ulcers who is followed by wound care is brought in today due to fever. He had wound cultures done 3 days ago that have grown Proteus mirabilis and Klebsiella pneumoniae. Sensitivities are not complete. The wounds were noted to have significant odor and heavy green drainage. In addition the patient had some nausea and vomiting and home health noted that his blood pressure was dipping. He was advised to come to the emergency room. Wound cultures were drawn from the right hip wound. His wife reports that when the wound is pressed above pus comes out. Both Klebsiella and Proteus maybe treated with fluoroquinolones which are both IV and oral. The patient's wife preferred if the patient came home so that she could attend to his wounds however the patient expressed a desire to be admitted.     Today:  Blood sugars running low we will do see the scheduled preprandial and put on sliding scale.    OBJECTIVE/PHYSICAL EXAM     VITAL SIGNS (MOST RECENT):  Temp: 98.4 °F (36.9 °C) (03/23/24 2128)  Pulse: (!) 128 (03/23/24 2112)  Resp: 18 (03/23/24 2340)  BP: (!) 144/99 (03/23/24 2112)  SpO2: 98 % (03/23/24 1930) VITAL SIGNS (24 HOUR RANGE):  Temp:  [98.3 °F (36.8 °C)-98.4 °F (36.9 °C)] 98.4 °F (36.9 °C)  Pulse:  [] 128  Resp:  [18-21] 18  SpO2:  [98 %] 98 %  BP: (144-150)/(94-99) 144/99     GENERAL: In no acute distress, afebrile  HEENT:  PERRLA  CHEST: Clear to auscultation bilaterally  HEART: S1, S2, no appreciable murmur  ABDOMEN: Soft, nontender, BS +  MSK: Warm, no lower extremity edema, no clubbing or cyanosis  NEUROLOGIC: Alert and oriented x4  INTEGUMENTARY:  See media for wounds    ASSESSMENT/PLAN     Nonhealing chronic wound   VRE, Proteus, Pseudomonas, Klebsiella  S/p debridement  "with Dr. Jean on 02/27/2024  Zyvox and Tobramycin - will need 6 weeks of treatment given exposed bone 03/02/2024-04/12/2024  Wound VAC and wound care  Pre-albumin now wnl   Appreciate dietary recommendations for supplements     Atrial Fibrillation  Hypertension/Hypotension   Diltiazem started 3/16/2024 - dose decreased to 30mg BID on 03/20/2024, increased to 30mg Q8 today  No AC 2/2 patient request as he has has prior episodes of acute blood loss from wounds - aspirin d/c 02/28/2024  Hypertensive episodes are associated with pain    Post op anemia   H&H improved s/p 2u pRBC 02/28/2024 and 1u pRBC on 02/29/2024  No anticoagulation at this time - aspirin d/c 02/28/2024     Hyperglycemia in setting of DM  A1c 02/23/2024 8.5%  Reports taking 80u Levemir BID  Continue home Sitagliptin  Continue titrating insulin regimen as needed    GISSEL/Hyperkalemia  IV fluids today     Thrombocytopenia   Peripheral smear without significant findings     Left upper extremity swelling  Ultrasound without DVT     Hx of: Neurogenic bladder, Quadriplegia     DVT prophylaxis:  SCDs, history of bleeding in the recent past    Anticipated discharge and disposition: home with [facility] when antibiotics are complete   __________________________________________________________________________    LABS/MICRO/MEDS/DIAGNOSTICS     LABS  Recent Labs     03/24/24  0522      K 5.2*   CHLORIDE 110*   CO2 20*   BUN 42.9*   CREATININE 1.56*   GLUCOSE 136*   CALCIUM 8.4   ALKPHOS 61   AST 6   ALT 10   ALBUMIN 2.3*     No results for input(s): "WBC", "RBC", "HCT", "MCV", "PLT" in the last 72 hours.    Invalid input(s): "HG"    MICROBIOLOGY  Microbiology Results (last 7 days)       Procedure Component Value Units Date/Time    Direct Prep (Skin, Hair, Nails) [[id number]] Collected: 03/20/24 1413    Order Status: Completed Specimen: Skin Updated: 03/21/24 0152     KOH Prep No fungal elements seen             MEDICATIONS   acetaminophen  650 mg Oral QHS    " ascorbic acid (vitamin C)  500 mg Oral Daily    cetirizine  10 mg Oral Daily    collagenase   Topical (Top) Daily    diltiaZEM  30 mg Oral Q8H    ferrous sulfate  1 tablet Oral Daily    fluconazole  200 mg Oral Weekly    insulin detemir U-100  15 Units Subcutaneous QHS    Lactobacillus acidophilus  1 capsule Oral TID WM    linezolid  600 mg Oral Q12H    loperamide  4 mg Oral Daily    miconazole   Topical (Top) BID    pantoprazole  40 mg Oral Daily    SITagliptin phosphate  100 mg Oral Daily    sodium chloride 0.9%  10 mL Intravenous Q6H    sodium polystyrene sulfonate  30 g Oral Once    tobramycin IV (PEDS and ADULTS)  360 mg Intravenous Q48H         INFUSIONS           DIAGNOSTIC TESTS  US Upper Extremity Veins Left   Final Result      No venous thrombosis identified.         Electronically signed by: Andrade Perez   Date:    03/18/2024   Time:    21:08      X-Ray Chest 1 View   Final Result      Left PICC tip overlies the mid SVC.         Electronically signed by: Oj Solomon   Date:    02/28/2024   Time:    14:29         EF   Date Value Ref Range Status   05/09/2023 60 % Final   07/18/2022 40 % Final        NUTRITION STATUS  Patient meets ASPEN criteria for   malnutrition of   per RD assessment as evidenced by:                       A minimum of two characteristics is recommended for diagnosis of either severe or non-severe malnutrition.     Case related differential diagnoses have been reviewed; assessment and plan has been documented. I have personally reviewed the labs and test results that are currently available; I have reviewed the patients medication list. I have reviewed the consulting providers recommendations. I have reviewed or attempted to review medical records based upon their availability.  All of the patient's and/or family's questions have been addressed and answered to the best of my ability.  I will continue to monitor closely and make adjustments to medical management as needed.  This document  was created using M*Modal Fluency Direct.  Transcription errors may have been made.  Please contact me if any questions may rise regarding documentation to clarify transcription.      Jeanette Mann MD   Internal Medicine  Department of Hospital Medicine [facility] - [facility]

## 2024-03-24 NOTE — PROGRESS NOTES
"Pharmacokinetic Follow Up: Tobramycin    Assessment of levels:   The trough level was drawn ~47  hours post-dose and can be used to guide therapy at this time. Above goal trough of <1 mcg/mL.      Regimen Plan:   Change regimen from Tobramycin 360 mg IV every 48 hours to tobramycin 300 mg IV every 48 hours. Scr increased but relatively stable over last few days.   Next trough level to be drawn 3/26 @ 1330.    Recent Labs   Lab Result Units 03/24/24  0751   Tobramycin Trough ug/ml 1.9       Pharmacy will continue to monitor.    Thank you for the consult,   Megha Acuna      Patient brief summary:  Antonio Galvan II is a 58 y.o. male initiated on aminoglycoside therapy for treatment of skin & soft tissue infection    Drug Allergies:   Review of patient's allergies indicates:   Allergen Reactions    Metoprolol Other (See Comments)     Pt refuses, states his arrest occurred after receiving this medication       Actual Body Weight:   94.4 kg     Adjust Body Weight:   77.4 kg     Ideal Body Weight:  66.1 kg     Renal Function:   Estimated Creatinine Clearance: 57.9 mL/min (A) (based on SCr of 1.56 mg/dL (H)).,     Dialysis Method (if applicable):  N/A    CBC (last 72 hours):  No results for input(s): "WHITE BLOOD CELL COUNT", "HEMOGLOBIN", "HEMATOCRIT", "PLATELETS", "GRAN%", "LYMPH%", "MONO%", "EOSINOPHIL%", "BASOPHIL%", "DIFFERENTIAL METHOD" in the last 72 hours.    Metabolic Panel (last 72 hours):  Recent Labs   Lab Result Units 03/22/24  0542 03/23/24  0522 03/24/24  0522   Sodium Level mmol/L 138 142 139   Potassium Level mmol/L 4.9 5.1 5.2*   Chloride mmol/L 109* 111* 110*   Carbon Dioxide mmol/L 20* 20* 20*   Glucose Level mg/dL 169* 133* 136*   Blood Urea Nitrogen mg/dL 50.1* 45.0* 42.9*   Creatinine mg/dL 1.56* 1.43* 1.56*   Albumin Level g/dL  --   --  2.3*   Bilirubin Total mg/dL  --   --  0.2   Alkaline Phosphatase unit/L  --   --  61   Aspartate Aminotransferase unit/L  --   --  6   Alanine " Aminotransferase unit/L  --   --  10       Aminoglycoside Administrations:  aminoglycosides given in last 96 hours                     tobramycin (NEBCIN) 360 mg in dextrose 5 % (D5W) 100 mL IVPB (mg) 360 mg New Bag 03/22/24 0913                    Microbiologic Results:  Microbiology Results (last 7 days)       Procedure Component Value Units Date/Time    Direct Prep (Skin, Hair, Nails) [[id number]] Collected: 03/20/24 1413    Order Status: Completed Specimen: Skin Updated: 03/21/24 0152     KOH Prep No fungal elements seen

## 2024-03-24 NOTE — PLAN OF CARE
Problem: Adult Inpatient Plan of Care  Goal: Plan of Care Review  Outcome: Ongoing, Progressing  Flowsheets (Taken 3/24/2024 0019)  Plan of Care Reviewed With: patient  Goal: Patient-Specific Goal (Individualized)  Outcome: Ongoing, Progressing  Flowsheets (Taken 3/24/2024 0019)  Anxieties, Fears or Concerns: none expressed  Individualized Care Needs: turn q 2, monitor cbg, safety  Goal: Absence of Hospital-Acquired Illness or Injury  Outcome: Ongoing, Progressing  Intervention: Identify and Manage Fall Risk  Flowsheets (Taken 3/24/2024 0019)  Safety Promotion/Fall Prevention:   assistive device/personal item within reach   family to remain at bedside   room near unit station   side rails raised x 3  Intervention: Prevent Skin Injury  Flowsheets (Taken 3/24/2024 0019)  Body Position:   left   side-lying  Skin Protection:   adhesive use limited   tubing/devices free from skin contact  Intervention: Prevent and Manage VTE (Venous Thromboembolism) Risk  Flowsheets (Taken 3/24/2024 0019)  Activity Management: Rolling - L1  VTE Prevention/Management: bleeding precations maintained  Range of Motion: active ROM (range of motion) encouraged  Intervention: Prevent Infection  Flowsheets (Taken 3/24/2024 0019)  Infection Prevention:   cohorting utilized   personal protective equipment utilized  Goal: Optimal Comfort and Wellbeing  Outcome: Ongoing, Progressing  Intervention: Monitor Pain and Promote Comfort  Flowsheets (Taken 3/24/2024 0019)  Pain Management Interventions:   care clustered   medication offered  Intervention: Provide Person-Centered Care  Flowsheets (Taken 3/24/2024 0019)  Trust Relationship/Rapport:   care explained   choices provided   empathic listening provided   reassurance provided   thoughts/feelings acknowledged  Goal: Readiness for Transition of Care  Outcome: Ongoing, Progressing  Intervention: Mutually Develop Transition Plan  Flowsheets (Taken 3/24/2024 0019)  Equipment Currently Used at Home:    hospital bed   lift device   power chair  Transportation Anticipated: family or friend will provide  Who are your caregiver(s) and their phone number(s)?: [phone] Judy (mother )  Communicated SADE with patient/caregiver: Date not available/Unable to determine  Do you expect to return to your current living situation?: Yes  Do you have help at home or someone to help you manage your care at home?: Yes  Readmission within 30 days?: No  Do you currently have service(s) that help you manage your care at home?: No  Is the pt/caregiver preference to resume services with current agency: No     Problem: Diabetes Comorbidity  Goal: Blood Glucose Level Within Targeted Range  Outcome: Ongoing, Progressing  Intervention: Monitor and Manage Glycemia  Flowsheets (Taken 3/24/2024 0019)  Glycemic Management:   blood glucose monitored   supplemental insulin given     Problem: Skin Injury Risk Increased  Goal: Skin Health and Integrity  Outcome: Ongoing, Progressing  Intervention: Optimize Skin Protection  Flowsheets (Taken 3/24/2024 0019)  Pressure Reduction Techniques: weight shift assistance provided  Pressure Reduction Devices: positioning supports utilized  Skin Protection:   adhesive use limited   tubing/devices free from skin contact  Head of Bed (HOB) Positioning: HOB at 30 degrees  Intervention: Promote and Optimize Oral Intake  Flowsheets (Taken 3/24/2024 0019)  Oral Nutrition Promotion: calorie-dense foods provided     Problem: Impaired Wound Healing  Goal: Optimal Wound Healing  Outcome: Ongoing, Progressing  Intervention: Promote Wound Healing  Flowsheets (Taken 3/24/2024 0019)  Oral Nutrition Promotion: calorie-dense foods provided  Sleep/Rest Enhancement:   awakenings minimized   regular sleep/rest pattern promoted  Activity Management: Rolling - L1  Pain Management Interventions:   care clustered   medication offered     Problem: Fall Injury Risk  Goal: Absence of Fall and Fall-Related Injury  Outcome: Ongoing,  Progressing  Intervention: Identify and Manage Contributors  Flowsheets (Taken 3/24/2024 0019)  Self-Care Promotion:   BADL personal objects within reach   safe use of adaptive equipment encouraged  Medication Review/Management: medications reviewed  Intervention: Promote Injury-Free Environment  Flowsheets (Taken 3/24/2024 0019)  Safety Promotion/Fall Prevention:   assistive device/personal item within reach   family to remain at bedside   room near unit station   side rails raised x 3     Problem: Infection  Goal: Absence of Infection Signs and Symptoms  Outcome: Ongoing, Progressing  Intervention: Prevent or Manage Infection  Flowsheets (Taken 3/24/2024 0019)  Fever Reduction/Comfort Measures:   lightweight bedding   lightweight clothing  Infection Management: aseptic technique maintained  Isolation Precautions:   contact   protective

## 2024-03-24 NOTE — PLAN OF CARE
Problem: Adult Inpatient Plan of Care  Goal: Plan of Care Review  Outcome: Ongoing, Progressing  Flowsheets (Taken 3/24/2024 1108)  Plan of Care Reviewed With:   patient   family  Goal: Patient-Specific Goal (Individualized)  Outcome: Ongoing, Progressing  Flowsheets (Taken 3/24/2024 1108)  Anxieties, Fears or Concerns: getting enough rest  Individualized Care Needs: monitor blood sugar and heart rate/rhythm, IV abt, turn q 2, wound care/wound vac  Goal: Absence of Hospital-Acquired Illness or Injury  Outcome: Ongoing, Progressing  Intervention: Identify and Manage Fall Risk  Flowsheets (Taken 3/24/2024 1108)  Safety Promotion/Fall Prevention:   assistive device/personal item within reach   Fall Risk signage in place   family to remain at bedside   room near unit station  Intervention: Prevent Skin Injury  Flowsheets (Taken 3/24/2024 1108)  Body Position:   turned   sitting up in bed  Skin Protection:   adhesive use limited   tubing/devices free from skin contact   skin sealant/moisture barrier applied   incontinence pads utilized  Intervention: Prevent and Manage VTE (Venous Thromboembolism) Risk  Flowsheets (Taken 3/24/2024 1108)  Activity Management: Rolling - L1  VTE Prevention/Management:   bleeding precations maintained   bleeding risk assessed  Intervention: Prevent Infection  Flowsheets (Taken 3/24/2024 1108)  Infection Prevention:   hand hygiene promoted   single patient room provided   personal protective equipment utilized   equipment surfaces disinfected   environmental surveillance performed   cohorting utilized   rest/sleep promoted  Goal: Optimal Comfort and Wellbeing  Outcome: Ongoing, Progressing  Intervention: Monitor Pain and Promote Comfort  Flowsheets (Taken 3/24/2024 1108)  Pain Management Interventions:   pain management plan reviewed with patient/caregiver   pillow support provided   position adjusted   quiet environment facilitated   relaxation techniques promoted  Intervention: Provide  Person-Centered Care  Flowsheets (Taken 3/24/2024 1108)  Trust Relationship/Rapport:   care explained   choices provided   questions answered   questions encouraged   reassurance provided  Goal: Readiness for Transition of Care  Outcome: Ongoing, Progressing     Problem: Diabetes Comorbidity  Goal: Blood Glucose Level Within Targeted Range  Outcome: Ongoing, Progressing  Intervention: Monitor and Manage Glycemia  Flowsheets (Taken 3/24/2024 1108)  Glycemic Management: blood glucose monitored     Problem: Skin Injury Risk Increased  Goal: Skin Health and Integrity  Outcome: Ongoing, Progressing  Intervention: Optimize Skin Protection  Flowsheets (Taken 3/24/2024 1108)  Pressure Reduction Techniques:   heels elevated off bed   weight shift assistance provided  Pressure Reduction Devices:   positioning supports utilized   specialty bed utilized  Skin Protection:   adhesive use limited   tubing/devices free from skin contact   skin sealant/moisture barrier applied   incontinence pads utilized  Head of Bed (HOB) Positioning: HOB at 20-30 degrees     Problem: Impaired Wound Healing  Goal: Optimal Wound Healing  Outcome: Ongoing, Progressing     Problem: Fall Injury Risk  Goal: Absence of Fall and Fall-Related Injury  Outcome: Ongoing, Progressing  Intervention: Identify and Manage Contributors  Flowsheets (Taken 3/24/2024 1108)  Self-Care Promotion: BADL personal objects within reach  Medication Review/Management: medications reviewed  Intervention: Promote Injury-Free Environment  Flowsheets (Taken 3/24/2024 1108)  Safety Promotion/Fall Prevention:   assistive device/personal item within reach   Fall Risk signage in place   family to remain at bedside   room near unit station     Problem: Infection  Goal: Absence of Infection Signs and Symptoms  Outcome: Ongoing, Progressing  Intervention: Prevent or Manage Infection  Flowsheets (Taken 3/24/2024 1108)  Infection Management: aseptic technique maintained  Isolation  Precautions:   precautions maintained   contact

## 2024-03-25 LAB
ANION GAP SERPL CALC-SCNC: 8 MEQ/L
BUN SERPL-MCNC: 43.7 MG/DL (ref 8.4–25.7)
CALCIUM SERPL-MCNC: 8.8 MG/DL (ref 8.4–10.2)
CHLORIDE SERPL-SCNC: 110 MMOL/L (ref 98–107)
CO2 SERPL-SCNC: 19 MMOL/L (ref 22–29)
CREAT SERPL-MCNC: 1.45 MG/DL (ref 0.73–1.18)
CREAT/UREA NIT SERPL: 30
GFR SERPLBLD CREATININE-BSD FMLA CKD-EPI: 57 MLS/MIN/1.73/M2
GLUCOSE SERPL-MCNC: 104 MG/DL (ref 70–110)
GLUCOSE SERPL-MCNC: 113 MG/DL (ref 70–110)
GLUCOSE SERPL-MCNC: 130 MG/DL (ref 74–100)
GLUCOSE SERPL-MCNC: 140 MG/DL (ref 70–110)
GLUCOSE SERPL-MCNC: 147 MG/DL (ref 70–110)
POTASSIUM SERPL-SCNC: 5.2 MMOL/L (ref 3.5–5.1)
SODIUM SERPL-SCNC: 137 MMOL/L (ref 136–145)

## 2024-03-25 PROCEDURE — 27000207 HC ISOLATION

## 2024-03-25 PROCEDURE — 97606 NEG PRS WND THER DME>50 SQCM: CPT

## 2024-03-25 PROCEDURE — 94760 N-INVAS EAR/PLS OXIMETRY 1: CPT

## 2024-03-25 PROCEDURE — 25000003 PHARM REV CODE 250: Performed by: STUDENT IN AN ORGANIZED HEALTH CARE EDUCATION/TRAINING PROGRAM

## 2024-03-25 PROCEDURE — 97530 THERAPEUTIC ACTIVITIES: CPT

## 2024-03-25 PROCEDURE — 25000003 PHARM REV CODE 250: Performed by: PHYSICIAN ASSISTANT

## 2024-03-25 PROCEDURE — 99900035 HC TECH TIME PER 15 MIN (STAT)

## 2024-03-25 PROCEDURE — 11000004 HC SNF PRIVATE

## 2024-03-25 PROCEDURE — A4216 STERILE WATER/SALINE, 10 ML: HCPCS | Performed by: INTERNAL MEDICINE

## 2024-03-25 PROCEDURE — 63600175 PHARM REV CODE 636 W HCPCS: Performed by: STUDENT IN AN ORGANIZED HEALTH CARE EDUCATION/TRAINING PROGRAM

## 2024-03-25 PROCEDURE — 80048 BASIC METABOLIC PNL TOTAL CA: CPT | Performed by: PHYSICIAN ASSISTANT

## 2024-03-25 PROCEDURE — 25000003 PHARM REV CODE 250: Performed by: INTERNAL MEDICINE

## 2024-03-25 RX ORDER — DILTIAZEM HYDROCHLORIDE 30 MG/1
30 TABLET, FILM COATED ORAL EVERY 6 HOURS
Status: DISCONTINUED | OUTPATIENT
Start: 2024-03-25 | End: 2024-03-28

## 2024-03-25 RX ORDER — SODIUM CHLORIDE 9 MG/ML
INJECTION, SOLUTION INTRAVENOUS CONTINUOUS
Status: DISCONTINUED | OUTPATIENT
Start: 2024-03-25 | End: 2024-03-26

## 2024-03-25 RX ADMIN — SODIUM CHLORIDE, PRESERVATIVE FREE 10 ML: 5 INJECTION INTRAVENOUS at 12:03

## 2024-03-25 RX ADMIN — OXYCODONE HYDROCHLORIDE AND ACETAMINOPHEN 500 MG: 500 TABLET ORAL at 09:03

## 2024-03-25 RX ADMIN — CETIRIZINE HYDROCHLORIDE 10 MG: 10 TABLET, FILM COATED ORAL at 09:03

## 2024-03-25 RX ADMIN — Medication 1 CAPSULE: at 09:03

## 2024-03-25 RX ADMIN — DILTIAZEM HYDROCHLORIDE 30 MG: 30 TABLET, FILM COATED ORAL at 01:03

## 2024-03-25 RX ADMIN — SODIUM CHLORIDE: 9 INJECTION, SOLUTION INTRAVENOUS at 02:03

## 2024-03-25 RX ADMIN — MICONAZOLE NITRATE: 20 CREAM TOPICAL at 08:03

## 2024-03-25 RX ADMIN — LOPERAMIDE HYDROCHLORIDE 4 MG: 2 CAPSULE ORAL at 09:03

## 2024-03-25 RX ADMIN — Medication 1 CAPSULE: at 05:03

## 2024-03-25 RX ADMIN — PANTOPRAZOLE SODIUM 40 MG: 40 TABLET, DELAYED RELEASE ORAL at 09:03

## 2024-03-25 RX ADMIN — LINEZOLID 600 MG: 600 TABLET, FILM COATED ORAL at 09:03

## 2024-03-25 RX ADMIN — Medication 1 CAPSULE: at 01:03

## 2024-03-25 RX ADMIN — LINEZOLID 600 MG: 600 TABLET, FILM COATED ORAL at 08:03

## 2024-03-25 RX ADMIN — SODIUM CHLORIDE, PRESERVATIVE FREE 10 ML: 5 INJECTION INTRAVENOUS at 05:03

## 2024-03-25 RX ADMIN — HYDROCODONE BITARTRATE AND ACETAMINOPHEN 1 TABLET: 7.5; 325 TABLET ORAL at 09:03

## 2024-03-25 RX ADMIN — DILTIAZEM HYDROCHLORIDE 30 MG: 30 TABLET, FILM COATED ORAL at 11:03

## 2024-03-25 RX ADMIN — ACETAMINOPHEN 650 MG: 325 TABLET ORAL at 08:03

## 2024-03-25 RX ADMIN — DILTIAZEM HYDROCHLORIDE 30 MG: 30 TABLET, FILM COATED ORAL at 05:03

## 2024-03-25 RX ADMIN — FERROUS SULFATE TAB 325 MG (65 MG ELEMENTAL FE) 1 EACH: 325 (65 FE) TAB at 09:03

## 2024-03-25 RX ADMIN — INSULIN DETEMIR 15 UNITS: 100 INJECTION, SOLUTION SUBCUTANEOUS at 08:03

## 2024-03-25 RX ADMIN — SODIUM CHLORIDE, PRESERVATIVE FREE 10 ML: 5 INJECTION INTRAVENOUS at 11:03

## 2024-03-25 RX ADMIN — SITAGLIPTIN 100 MG: 50 TABLET, FILM COATED ORAL at 09:03

## 2024-03-25 RX ADMIN — COLLAGENASE SANTYL: 250 OINTMENT TOPICAL at 09:03

## 2024-03-25 NOTE — PLAN OF CARE
Problem: Adult Inpatient Plan of Care  Goal: Plan of Care Review  Outcome: Ongoing, Progressing  Flowsheets (Taken 3/25/2024 0100)  Plan of Care Reviewed With: patient  Goal: Patient-Specific Goal (Individualized)  Outcome: Ongoing, Progressing  Flowsheets (Taken 3/25/2024 0100)  Anxieties, Fears or Concerns: none  Individualized Care Needs: monitor cbg, monitor bp/hr, turn q 2  Goal: Absence of Hospital-Acquired Illness or Injury  Outcome: Ongoing, Progressing  Intervention: Identify and Manage Fall Risk  Flowsheets (Taken 3/25/2024 0100)  Safety Promotion/Fall Prevention:   assistive device/personal item within reach   family to remain at bedside   room near unit station   side rails raised x 3  Intervention: Prevent Skin Injury  Flowsheets (Taken 3/25/2024 0100)  Body Position:   side-lying   right  Skin Protection: adhesive use limited  Intervention: Prevent and Manage VTE (Venous Thromboembolism) Risk  Flowsheets (Taken 3/25/2024 0100)  Activity Management: Rolling - L1  VTE Prevention/Management: bleeding precations maintained  Range of Motion: ROM (range of motion) performed  Intervention: Prevent Infection  Flowsheets (Taken 3/25/2024 0100)  Infection Prevention:   cohorting utilized   personal protective equipment utilized   rest/sleep promoted  Goal: Optimal Comfort and Wellbeing  Outcome: Ongoing, Progressing  Intervention: Monitor Pain and Promote Comfort  Flowsheets (Taken 3/25/2024 0100)  Pain Management Interventions:   care clustered   medication offered   quiet environment facilitated  Intervention: Provide Person-Centered Care  Flowsheets (Taken 3/25/2024 0100)  Trust Relationship/Rapport:   care explained   choices provided   emotional support provided   empathic listening provided   questions answered   questions encouraged   reassurance provided   thoughts/feelings acknowledged  Goal: Readiness for Transition of Care  Outcome: Ongoing, Progressing  Intervention: Mutually Develop Transition  Plan  Flowsheets (Taken 3/25/2024 0100)  Equipment Currently Used at Home:   hospital bed   lift device   power chair  Transportation Anticipated: family or friend will provide  Who are your caregiver(s) and their phone number(s)?: Judy(mother) [phone]  Communicated SADE with patient/caregiver: Date not available/Unable to determine  Do you expect to return to your current living situation?: Yes  Do you have help at home or someone to help you manage your care at home?: Yes  Readmission within 30 days?: No  Do you currently have service(s) that help you manage your care at home?: No  Is the pt/caregiver preference to resume services with current agency: No     Problem: Diabetes Comorbidity  Goal: Blood Glucose Level Within Targeted Range  Outcome: Ongoing, Progressing  Intervention: Monitor and Manage Glycemia  Flowsheets (Taken 3/25/2024 0100)  Glycemic Management:   blood glucose monitored   supplemental insulin given     Problem: Skin Injury Risk Increased  Goal: Skin Health and Integrity  Outcome: Ongoing, Progressing  Intervention: Optimize Skin Protection  Flowsheets (Taken 3/25/2024 0100)  Pressure Reduction Techniques: heels elevated off bed  Pressure Reduction Devices: heel offloading device utilized  Skin Protection: adhesive use limited  Head of Bed (HOB) Positioning: HOB at 30-45 degrees  Intervention: Promote and Optimize Oral Intake  Flowsheets (Taken 3/25/2024 0100)  Oral Nutrition Promotion: calorie-dense foods provided     Problem: Impaired Wound Healing  Goal: Optimal Wound Healing  Outcome: Ongoing, Progressing  Intervention: Promote Wound Healing  Flowsheets (Taken 3/25/2024 0100)  Oral Nutrition Promotion: calorie-dense foods provided  Sleep/Rest Enhancement:   awakenings minimized   relaxation techniques promoted   noise level reduced   room darkened  Activity Management: Rolling - L1  Pain Management Interventions:   care clustered   medication offered   quiet environment facilitated      Problem: Fall Injury Risk  Goal: Absence of Fall and Fall-Related Injury  Outcome: Ongoing, Progressing  Intervention: Identify and Manage Contributors  Flowsheets (Taken 3/25/2024 0100)  Self-Care Promotion:   BADL personal objects within reach   safe use of adaptive equipment encouraged  Medication Review/Management: medications reviewed  Intervention: Promote Injury-Free Environment  Flowsheets (Taken 3/25/2024 0100)  Safety Promotion/Fall Prevention:   assistive device/personal item within reach   family to remain at bedside   room near unit station   side rails raised x 3     Problem: Infection  Goal: Absence of Infection Signs and Symptoms  Outcome: Ongoing, Progressing  Intervention: Prevent or Manage Infection  Flowsheets (Taken 3/25/2024 0100)  Fever Reduction/Comfort Measures:   lightweight bedding   lightweight clothing  Infection Management: aseptic technique maintained  Isolation Precautions:   contact   precautions maintained

## 2024-03-25 NOTE — PLAN OF CARE
[facility] - [facility]  Discharge Reassessment    Primary Care Provider: Jennifer Fernando NP    Expected Discharge Date: 4/12/2024    Reassessment (most recent)       Discharge Reassessment - 03/25/24 1835          Discharge Reassessment    Assessment Type Discharge Planning Reassessment     Did the patient's condition or plan change since previous assessment? No     Discharge Plan discussed with: Patient     Communicated SADE with patient/caregiver Date not available/Unable to determine     Discharge Plan A Home with family;Home Health     DME Needed Upon Discharge  none     Transition of Care Barriers None     Why the patient remains in the hospital Requires continued medical care        Post-Acute Status    Post-Acute Authorization Home Health     Home Health Status Pending medical clearance/testing     Hospital Resources/Appts/Education Provided Provided patient/caregiver with written discharge plan information     Discharge Delays None known at this time

## 2024-03-25 NOTE — PT/OT/SLP PROGRESS
Physical Therapy Treatment Note           Name: Antonio Galvan II    : 1965 (58 y.o.)  MRN: [id number]           TREATMENT SUMMARY AND RECOMMENDATIONS:    PT Received On: 24  PT Start Time: 1149     PT Stop Time: 1215  PT Total Time (min): 26 min     Subjective Assessment:   No complaints  Lethargic   x Awake, alert, cooperative  Uncooperative    Agitated  c/o pain    Appropriate  c/o fatigue    Confused x Treated at bedside     Emotionally labile  Treated in gym/dept.    Impulsive  Other:    Flat affect       Therapy Precautions:    Cognitive deficits  Spinal precautions    Collar - hard  Sternal precautions    Collar - soft   TLSO    Fall risk  LSO    Hip precautions - posterior  Knee immobilizer    Hip precautions - anterior  WBAT    Impaired communication  Partial weightbearing    Oxygen  TTWB    PEG tube  NWB    Visual deficits x Other: Wound Vac and Super pubic cath    Hearing deficits  x OK 'd to get in chair 2-3 hours per day       Treatment Objectives:     Mobility Training:   Assist level Comments    Bed mobility Total A Rolling side to side for tess placement and lower body dressing    Transfer Total A Tess from Bed>Chair; x 2-3 person assistance for proper positioning in chair - Due to 0/5 mm activation 1 person has to guide/protect feet, one maintains wound vac and catheter and one guides the tess.    Gait     Sit to stand transitions     Sitting balance     Standing balance      Wheelchair mobility     Car transfer     Other:          Therapeutic Exercise:   Exercise Sets Reps Comments                               Additional Comments:  Extra time needed for proper positioning in WC once OOB. Wound care stating wounds look good but continues to recommend tess vs slide board to reduce sheering.     Will continue caregiver training on tess; trying to reduce from 3 person assistance    Assessment: Patient tolerated session well - up in chair and outside.     PT Plan: Continue  per POC  Revisions made to plan of care: no     GOALS:   Multidisciplinary Problems       Physical Therapy Goals          Problem: Physical Therapy    Goal Priority Disciplines Outcome Goal Variances Interventions   Physical Therapy Goal     PT, PT/OT Ongoing, Progressing     Description: Goals to be met by: Discharge     Pt to be seen for ROM tasks 1-2 x/day to ensure proper positioning can be obtained to overall reduce impacts of prolonged bed rest and skin break down    Progress pending healing stages of current wounds    Patient will increase functional independence with mobility by performin. Pt to tolerate PROM tasks to B UE and LE, while obtaining 25% improvement in range.   2. Sitting EOB to tolerance pending wound healing - with pt demonstrating normal BP  3. Progress to OOB into chair once able. - Met  4. Pt to tolerate up to 3 hours in power chair - Met  5. Caregivers to be able to use tess with 1-2 people.                          Skilled PT Minutes Provided: 23   Communication with Treatment Team:     Equipment recommendations:       At end of treatment, patient remained:   Up in chair    x Up in wheelchair in room    In bed    With alarm activated    Bed rails up    Call bell in reach    x Family/friends present    Restraints secured properly    In bathroom with CNA/RN notified   x Nurse aware    In gym with therapist/tech    Other:

## 2024-03-25 NOTE — PT/OT/SLP PROGRESS
Physical Therapy Treatment Note           Name: Antonio Galvan II    : 1965 (58 y.o.)  MRN: [id number]           TREATMENT SUMMARY AND RECOMMENDATIONS:    PT Received On: 24  PT Start Time: 1453     PT Stop Time: 1517  PT Total Time (min): 24 min     Subjective Assessment:   No complaints  Lethargic   x Awake, alert, cooperative  Uncooperative    Agitated  c/o pain    Appropriate  c/o fatigue    Confused x Treated at bedside     Emotionally labile  Treated in gym/dept.    Impulsive  Other:    Flat affect       Therapy Precautions:    Cognitive deficits  Spinal precautions    Collar - hard  Sternal precautions    Collar - soft   TLSO    Fall risk  LSO    Hip precautions - posterior  Knee immobilizer    Hip precautions - anterior  WBAT    Impaired communication  Partial weightbearing    Oxygen  TTWB    PEG tube  NWB    Visual deficits x Other: Wound Vac and Super pubic cath    Hearing deficits  x OK 'd to get in chair 2-3 hours per day       Treatment Objectives:     Mobility Training:   Assist level Comments    Bed mobility Total A Rolling side to side for tess pad removal and doffing pants. Proper bed positioning completed with pt in supine using pillow for UE and LE to reduce skin break down.    Transfer Total A Tess from chair>bed; x 2-3 person assistance for maintenance of cath, wound vac and B LE   Gait     Sit to stand transitions     Sitting balance     Standing balance      Wheelchair mobility     Car transfer     Other: Care giver training  Educated on using a sheet or vilma pad between pt and slide board when at home to reduce friction. Will plan on practicing prior to DC       Therapeutic Exercise:   Exercise Sets Reps Comments                               Additional Comments:  Extra time needed for proper positioning and cleaning tasks once back in bed.  Will continue caregiver training on tess and safety with slide board (this is what they were doing at home)    Assessment:  Patient tolerated session well , but still needing 2-3 person assistance for positioning during tess.     PT Plan: Continue per POC  Revisions made to plan of care: No    GOALS:   Multidisciplinary Problems       Physical Therapy Goals          Problem: Physical Therapy    Goal Priority Disciplines Outcome Goal Variances Interventions   Physical Therapy Goal     PT, PT/OT Ongoing, Progressing     Description: Goals to be met by: Discharge     Pt to be seen for ROM tasks 1-2 x/day to ensure proper positioning can be obtained to overall reduce impacts of prolonged bed rest and skin break down    Progress pending healing stages of current wounds    Patient will increase functional independence with mobility by performin. Pt to tolerate PROM tasks to B UE and LE, while obtaining 25% improvement in range.   2. Sitting EOB to tolerance pending wound healing - with pt demonstrating normal BP  3. Progress to OOB into chair once able. - Met  4. Pt to tolerate up to 3 hours in power chair - Met  5. Caregivers to be able to use tess with 1-2 people.                          Skilled PT Minutes Provided: 23   Communication with Treatment Team:     Equipment recommendations:       At end of treatment, patient remained:   Up in chair     Up in wheelchair in room   x In bed    With alarm activated    Bed rails up   x Call bell in reach    x Family/friends present    Restraints secured properly    In bathroom with CNA/RN notified   x Nurse aware    In gym with therapist/tech    Other:

## 2024-03-25 NOTE — PROGRESS NOTES
Inpatient Nutrition Evaluation    Admit Date: 2/26/2024   Total duration of encounter: 28 days   Patient Age: 58 y.o.    Nutrition Recommendation/Prescription     Continue regular diet. Adjust diet, per patient request. Requesting sugar free.  2. Continue Arginaid 1 pk BID for wound healing 3. Continue ascorbic acid 500 mg PO daily 4. Weekly weights 5. Monitor intake, wt, labs, medications.       Nutrition Assessment     Chart Review    Reason Seen: continuous nutrition monitoring, length of stay, and follow-up    Malnutrition Screening Tool Results   Have you recently lost weight without trying?: Yes: 34 lbs or more  Have you been eating poorly because of a decreased appetite?: Yes   MST Score: 5   Diagnosis:  Infected wounds/sacrum, abd, feet  Klebsiella pneumoniae, Pseudomonas aeruginosa, Proteus mirabilis  DM,   Hypotension  Hyperglycemia  VRE  A-fib       Relevant Medical History:   Cardiac arrest         7/2022    Chronic skin ulcer      DM (diabetes mellitus)      Infected decubitus ulcer      Lymphedema of leg      Neurogenic bladder      Obesity, unspecified      Pressure ulcer of heel      Pressure ulcer of right foot, stage 3      Pressure ulcer of right ischium      Quadriplegia      Quadriplegic spinal paralysis        Scheduled Medications:  acetaminophen, 650 mg, QHS  ascorbic acid (vitamin C), 500 mg, Daily  cetirizine, 10 mg, Daily  collagenase, , Daily  diltiaZEM, 30 mg, Q6H  ferrous sulfate, 1 tablet, Daily  fluconazole, 200 mg, Weekly  insulin detemir U-100, 15 Units, QHS  Lactobacillus acidophilus, 1 capsule, TID WM  linezolid, 600 mg, Q12H  loperamide, 4 mg, Daily  miconazole, , BID  pantoprazole, 40 mg, Daily  SITagliptin phosphate, 100 mg, Daily  sodium chloride 0.9%, 10 mL, Q6H  sodium polystyrene sulfonate, 30 g, Once  tobramycin IV (PEDS and ADULTS), 300 mg, Q48H    Continuous Infusions:  sodium chloride 0.9%, Last Rate: 50 mL/hr at 03/25/24 1440    PRN Medications: 0.9%  NaCl infusion  (for blood administration), 0.9%  NaCl infusion (for blood administration), sodium chloride 0.9%, acetaminophen, albuterol, benzonatate, bisacodyL, budesonide, calcium carbonate, dextrose 10%, dextrose 10%, dextrose 10%, dextrose 10%, diphenhydrAMINE, glucagon (human recombinant), glucagon (human recombinant), glucose, glucose, glucose, glucose, hydrALAZINE, HYDROcodone-acetaminophen, HYDROcodone-acetaminophen, hydrOXYzine pamoate, insulin aspart U-100, melatonin, metoprolol, ondansetron, simethicone, Flushing PICC/Midline Protocol **AND** sodium chloride 0.9% **AND** sodium chloride 0.9%, tobramycin - pharmacy to dose, zolpidem    Recent Labs   Lab 03/19/24  0520 03/21/24  0508 03/22/24  0542 03/23/24  0522 03/24/24  0522 03/25/24  0759   NA  --  138 138 142 139 137   K  --  5.2* 4.9 5.1 5.2* 5.2*   CALCIUM  --  8.4 8.4 8.4 8.4 8.8   MG  --  2.00  --   --   --   --    CHLORIDE  --  109* 109* 111* 110* 110*   CO2  --  19* 20* 20* 20* 19*   BUN  --  48.3* 50.1* 45.0* 42.9* 43.7*   CREATININE  --  1.56* 1.56* 1.43* 1.56* 1.45*   EGFRNORACEVR  --  52 52 58 52 57   GLUCOSE  --  197* 169* 133* 136* 130*   BILITOT  --   --   --   --  0.2  --    ALKPHOS  --   --   --   --  61  --    ALT  --   --   --   --  10  --    AST  --   --   --   --  6  --    ALBUMIN  --   --   --   --  2.3*  --    PREALB 21.4  --   --   --   --   --    WBC  --  5.83  --   --   --   --    HGB  --  9.5*  --   --   --   --    HCT  --  31.1*  --   --   --   --      Nutrition Orders:  Diet Adult Regular  Dietary nutrition supplements Arginaid - Any flavor; BID    Appetite/Oral Intake: good/% of meals.  Factors Affecting Nutritional Intake:  multiple wounds  Food/P[employer]/Cultural Preferences: none reported  Food Allergies: none reported  Last Bowel Movement: 03/22/24  Wound(s):     Altered Skin Integrity 05/06/22 1140 Right Heel  Full thickness tissue loss. Subcutaneous fat may be visible but bone, tendon or muscle are not exposed-Tissue loss  "description: Full thickness       Altered Skin Integrity 01/12/23 1530 Sacral spine Full thickness tissue loss with exposed bone, tendon, or muscle. Often includes undermining and tunneling. May extend into muscle and/or supporting structures.-Tissue loss description: Full thickness       Altered Skin Integrity 08/01/23 1534 Left posterior Ankle Full thickness tissue loss. Base is covered by slough and/or eschar in the wound bed-Tissue loss description: Not applicable       Altered Skin Integrity 08/01/23 1535 Left anterior Ankle Other (comment) Full thickness tissue loss. Base is covered by slough and/or eschar in the wound bed-Tissue loss description: Not applicable     Comments  Pt intake is good, % of meals. Pt eating outside foods from family as well. Wound care nurse notes on wound, 3/25/24. Prealb WNL. Pt has arginaid and vitamin C for wound healing.       Anthropometrics    Height: 5' 7" (170.2 cm), Height Method: Stated  Last Weight:  (he sleep and the bed dont give thw weight on it) (03/25/24 0605), Weight Method: Bed Scale  BMI (Calculated): 32.6  BMI Classification: obese grade I (BMI 30-34.9)        Ideal Body Weight (IBW), Male: 148 lb     % Ideal Body Weight, Male (lb): 139.86 %                          Usual Weight Provided By: unable to obtain usual weight    Wt Readings from Last 5 Encounters:   03/18/24 94.4 kg (208 lb 1.8 oz)   02/27/24 93.9 kg (207 lb 0.2 oz)   02/23/24 93.9 kg (207 lb)   08/16/23 106.4 kg (234 lb 9.6 oz)   08/15/23 113.4 kg (250 lb)     Weight Change(s) Since Admission: Unable to weigh patient-pt bed scale don't work. Nursing reports.   Wt Readings from Last 1 Encounters:   03/18/24 0500 94.4 kg (208 lb 1.8 oz)   03/04/24 0500 95 kg (209 lb 7 oz)   02/26/24 1500 93.9 kg (207 lb)   Admit Weight: 93.9 kg (207 lb) (02/26/24 1500), Weight Method: Bed Scale (Used bed weight from admission 2/23/24 as his bed does not have a scale)    Patient Education     Not " applicable.    Nutrition Goals & Monitoring     Dietitian will monitor: food and beverage intake, weight, weight change, electrolyte/renal panel, glucose/endocrine profile, and gastrointestinal profile    Nutrition Risk/Follow-Up: low (follow-up in 5-7 days)  Patients assigned 'low nutrition risk' status do not qualify for a full nutritional assessment but will be monitored and re-evaluated in a 5-7 day time period. Please consult if re-evaluation needed sooner.

## 2024-03-25 NOTE — PLAN OF CARE
Problem: Adult Inpatient Plan of Care  Goal: Plan of Care Review  Outcome: Ongoing, Progressing  Goal: Patient-Specific Goal (Individualized)  Outcome: Ongoing, Progressing  Flowsheets (Taken 3/25/2024 1516)  Anxieties, Fears or Concerns: none expressed  Individualized Care Needs: monitor wound vac, wound care, turn Q2, antibiotic and IV fluid therapy  Goal: Absence of Hospital-Acquired Illness or Injury  Outcome: Ongoing, Progressing  Intervention: Identify and Manage Fall Risk  Flowsheets (Taken 3/25/2024 1516)  Safety Promotion/Fall Prevention:   bed alarm set   assistive device/personal item within reach   nonskid shoes/socks when out of bed   side rails raised x 2  Goal: Optimal Comfort and Wellbeing  Outcome: Ongoing, Progressing  Goal: Readiness for Transition of Care  Outcome: Ongoing, Progressing     Problem: Diabetes Comorbidity  Goal: Blood Glucose Level Within Targeted Range  Outcome: Ongoing, Progressing  Intervention: Monitor and Manage Glycemia  Flowsheets (Taken 3/25/2024 1516)  Glycemic Management: blood glucose monitored     Problem: Skin Injury Risk Increased  Goal: Skin Health and Integrity  Outcome: Ongoing, Progressing  Intervention: Optimize Skin Protection  Flowsheets (Taken 3/25/2024 1516)  Pressure Reduction Techniques: frequent weight shift encouraged  Skin Protection:   adhesive use limited   incontinence pads utilized   tubing/devices free from skin contact     Problem: Impaired Wound Healing  Goal: Optimal Wound Healing  Outcome: Ongoing, Progressing  Intervention: Promote Wound Healing  Flowsheets (Taken 3/25/2024 1516)  Sleep/Rest Enhancement: noise level reduced  Activity Management: Rolling - L1  Pain Management Interventions:   pillow support provided   quiet environment facilitated   position adjusted     Problem: Fall Injury Risk  Goal: Absence of Fall and Fall-Related Injury  Outcome: Ongoing, Progressing  Intervention: Identify and Manage Contributors  Flowsheets (Taken  3/25/2024 1516)  Self-Care Promotion:   safe use of adaptive equipment encouraged   BADL personal objects within reach  Medication Review/Management: medications reviewed     Problem: Infection  Goal: Absence of Infection Signs and Symptoms  Outcome: Ongoing, Progressing  Intervention: Prevent or Manage Infection  Flowsheets (Taken 3/25/2024 1516)  Infection Management: aseptic technique maintained  Isolation Precautions: (Simultaneous filing. User may not have seen previous data.) protective

## 2024-03-25 NOTE — PROGRESS NOTES
[facility]  [facility] MEDICINE ~ PROGRESS NOTE  CHIEF COMPLAINT     Hospital follow up for nonhealing wound    HOSPITAL COURSE     58-year-old man with quadriplegic spinal paralysis and numerous decubitus ulcers who is followed by wound care is brought in today due to fever. He had wound cultures done 3 days ago that have grown Proteus mirabilis and Klebsiella pneumoniae. Sensitivities are not complete. The wounds were noted to have significant odor and heavy green drainage. In addition the patient had some nausea and vomiting and home health noted that his blood pressure was dipping. He was advised to come to the emergency room. Wound cultures were drawn from the right hip wound. His wife reports that when the wound is pressed above pus comes out. Both Klebsiella and Proteus maybe treated with fluoroquinolones which are both IV and oral. The patient's wife preferred if the patient came home so that she could attend to his wounds however the patient expressed a desire to be admitted.     Today:  Heart rate remains elevated, and tele called to alert nursing that he was in a flutter.  Increasing diltiazem today to 30 mg q.6.  Monitor BP closely.    OBJECTIVE/PHYSICAL EXAM     VITAL SIGNS (MOST RECENT):  Temp: 97.9 °F (36.6 °C) (03/24/24 2028)  Pulse: (!) 126 (03/24/24 2006)  Resp: 18 (03/25/24 0919)  BP: 133/89 (03/24/24 2006)  SpO2: 99 % (03/24/24 2006) VITAL SIGNS (24 HOUR RANGE):  Temp:  [97.9 °F (36.6 °C)] 97.9 °F (36.6 °C)  Pulse:  [125-126] 126  Resp:  [18] 18  SpO2:  [99 %] 99 %  BP: (121-133)/(84-89) 133/89     GENERAL: In no acute distress, afebrile  HEENT:  PERRLA  CHEST: Clear to auscultation bilaterally  HEART: S1, S2, no appreciable murmur  ABDOMEN: Soft, nontender, BS +  MSK: Warm, no lower extremity edema, no clubbing or cyanosis  NEUROLOGIC: Alert and oriented x4  INTEGUMENTARY:  See media for wounds    ASSESSMENT/PLAN     Nonhealing chronic wound   VRE, Proteus,  "Pseudomonas, Klebsiella  S/p debridement with Dr. Jean on 02/27/2024  Zyvox and Tobramycin - will need 6 weeks of treatment given exposed bone 03/02/2024-04/12/2024  Wound VAC and wound care  Pre-albumin now wnl   Appreciate dietary recommendations for supplements     Atrial Fibrillation  Hypertension/Hypotension   Diltiazem started 3/16/2024 and has required adjustments, increase to 30mg Q6 today  No AC 2/2 patient request as he has has prior episodes of acute blood loss from wounds - aspirin d/c 02/28/2024  Hypertensive episodes are associated with pain    Post op anemia   H&H improved s/p 2u pRBC 02/28/2024 and 1u pRBC on 02/29/2024  No anticoagulation at this time - aspirin d/c 02/28/2024     Hyperglycemia/hypoglycemia in setting of DM  A1c 02/23/2024 8.5%  Reports taking 80u Levemir BID  Continue home Sitagliptin  Continue titrating insulin regimen as needed    GISSEL/Hyperkalemia  IV fluids today   Kayexalate 30g x1     Thrombocytopenia   Peripheral smear without significant findings     Left upper extremity swelling  Ultrasound without DVT     Hx of: Neurogenic bladder, Quadriplegia     DVT prophylaxis:  SCDs, history of bleeding in the recent past    Anticipated discharge and disposition: home with [facility] when antibiotics are complete on 04/12/2024  __________________________________________________________________________    LABS/MICRO/MEDS/DIAGNOSTICS     LABS  Recent Labs     03/24/24  0522 03/25/24  0759    137   K 5.2* 5.2*   CHLORIDE 110* 110*   CO2 20* 19*   BUN 42.9* 43.7*   CREATININE 1.56* 1.45*   GLUCOSE 136* 130*   CALCIUM 8.4 8.8   ALKPHOS 61  --    AST 6  --    ALT 10  --    ALBUMIN 2.3*  --      No results for input(s): "WBC", "RBC", "HCT", "MCV", "PLT" in the last 72 hours.    Invalid input(s): "HG"    MICROBIOLOGY  Microbiology Results (last 7 days)       Procedure Component Value Units Date/Time    Direct Prep (Skin, Hair, Nails) [[id number]] Collected: 03/20/24 1413    Order Status: " Completed Specimen: Skin Updated: 03/21/24 0152     KOH Prep No fungal elements seen             MEDICATIONS   acetaminophen  650 mg Oral QHS    ascorbic acid (vitamin C)  500 mg Oral Daily    cetirizine  10 mg Oral Daily    collagenase   Topical (Top) Daily    diltiaZEM  30 mg Oral Q8H    ferrous sulfate  1 tablet Oral Daily    fluconazole  200 mg Oral Weekly    insulin detemir U-100  15 Units Subcutaneous QHS    Lactobacillus acidophilus  1 capsule Oral TID WM    linezolid  600 mg Oral Q12H    loperamide  4 mg Oral Daily    miconazole   Topical (Top) BID    pantoprazole  40 mg Oral Daily    SITagliptin phosphate  100 mg Oral Daily    sodium chloride 0.9%  10 mL Intravenous Q6H    sodium polystyrene sulfonate  30 g Oral Once    tobramycin IV (PEDS and ADULTS)  300 mg Intravenous Q48H         INFUSIONS       DIAGNOSTIC TESTS  US Upper Extremity Veins Left   Final Result      No venous thrombosis identified.         Electronically signed by: Andrade Perez   Date:    03/18/2024   Time:    21:08      X-Ray Chest 1 View   Final Result      Left PICC tip overlies the mid SVC.         Electronically signed by: Oj Solomon   Date:    02/28/2024   Time:    14:29         EF   Date Value Ref Range Status   05/09/2023 60 % Final   07/18/2022 40 % Final        NUTRITION STATUS  Patient meets ASPEN criteria for   malnutrition of   per RD assessment as evidenced by:                       A minimum of two characteristics is recommended for diagnosis of either severe or non-severe malnutrition.     Case related differential diagnoses have been reviewed; assessment and plan has been documented. I have personally reviewed the labs and test results that are currently available; I have reviewed the patients medication list. I have reviewed the consulting providers recommendations. I have reviewed or attempted to review medical records based upon their availability.  All of the patient's and/or family's questions have been addressed and  answered to the best of my ability.  I will continue to monitor closely and make adjustments to medical management as needed.  This document was created using M*Modal Fluency Direct.  Transcription errors may have been made.  Please contact me if any questions may rise regarding documentation to clarify transcription.      ANTHONY Vazquez   Internal Medicine  Department of Hospital Medicine [facility]

## 2024-03-25 NOTE — PROGRESS NOTES
Follow up assessment performed.  R and L heel / ankle ulcerations remain closed.  Recommend continuing to apply adaptic over newly healed skin on R heel until skin matures.  No need for adaptic to L ant/posterior ankle as sites have remained closed.   Will continue to wrap L foot as well while hospitalized to protect skin to area.  NPWT continues in use to Sacrum / R buttock ulcerations.    Seal remaining intact between dressing changes.   L lateral hip ulceration remains stable / granular with current dressing change regimen.  Will update wound care orders for R and L foot dressing changes.  Pressure prevention measures remain in place   03/25/24 1123   WOCN Assessment   WOCN Total Time (mins) 75   Visit Date 03/25/24   Consult Type Follow Up   WOCN Speciality Wound   Wound pressure   Continence Type Fecal   Procedure wound vac   Intervention assessed;changed   Teaching on-going   Skin Interventions   Device Skin Pressure Protection absorbent pad utilized/changed;positioning supports utilized;pressure points protected   Pressure Reduction Devices chair cushion utilized;alternating pressure pump (ADD);heel offloading device utilized;positioning supports utilized;specialty bed utilized   Pressure Reduction Techniques heels elevated off bed;positioned off wounds;pressure points protected;rest period provided between sit times;sit time limited to 2 hours;weight shift assistance provided   Skin Protection incontinence pads utilized;tubing/devices free from skin contact   Pressure Injury Prevention    Check Moisture Management Pad Done   Heel protection technique Heel boot   Check Medical Devices Done        Altered Skin Integrity 05/06/22 1140 Right Heel  Full thickness tissue loss. Subcutaneous fat may be visible but bone, tendon or muscle are not exposed   Date First Assessed/Time First Assessed: 05/06/22 1140   Altered Skin Integrity Present on Admission: yes  Side: Right  Location: Heel  Is this injury device  related?: No  Primary Wound Type: (c)   Description of Altered Skin Integrity: Full thickness...   Wound Image    Dressing Appearance Intact;Dry;Clean   Drainage Amount None   Appearance Tan;Fibrin;Intact;Dry   Periwound Area Scar tissue;Dry;Intact   Wound Edges Undefined   Wound Length (cm) 0 cm   Wound Width (cm) 0 cm   Wound Depth (cm) 0 cm   Wound Volume (cm^3) 0 cm^3   Wound Surface Area (cm^2) 0 cm^2   Care Cleansed with:;Antimicrobial agent;Wound cleanser   Dressing Changed;Non-adherent;Gauze;Rolled gauze   Periwound Care Dry periwound area maintained   Dressing Change Due 03/27/24        Altered Skin Integrity 01/12/23 1530 Sacral spine Full thickness tissue loss with exposed bone, tendon, or muscle. Often includes undermining and tunneling. May extend into muscle and/or supporting structures.   Date First Assessed/Time First Assessed: 01/12/23 1530   Altered Skin Integrity Present on Admission - Did Patient arrive to the hospital with altered skin?: yes  Location: Sacral spine  Description of Altered Skin Integrity: Full thickness tissue los...   Wound Image    Description of Altered Skin Integrity Full thickness tissue loss with exposed bone, tendon, or muscle. Often includes undermining and tunneling. May extend into muscle and/or supporting structures.   Drainage Amount Moderate   Drainage Characteristics/Odor Serosanguineous;No odor;Bleeding controlled   Appearance Red;Granulating;Tan;Yellow;Slough;Fibrin  (connective tissue)   Tissue loss description Full thickness   Red (%), Wound Tissue Color 60 %   Yellow (%), Wound Tissue Color 40 %   Periwound Area Intact   Wound Edges Defined   Wound Length (cm) 8 cm   Wound Width (cm) 7.5 cm   Wound Depth (cm) 2.8 cm   Wound Volume (cm^3) 168 cm^3   Wound Surface Area (cm^2) 60 cm^2   Care Cleansed with:;Antimicrobial agent;Wound cleanser   Dressing Changed;Foam;Transparent film  (black granufoam / drape)   Periwound Care Hydrocolloid barrier applied;Skin  barrier film applied;Other (see comments)  (mastisol for additional adhesive)   Dressing Change Due 03/28/24        Altered Skin Integrity 08/01/23 1534 Left posterior Ankle Full thickness tissue loss. Base is covered by slough and/or eschar in the wound bed   Date First Assessed/Time First Assessed: 08/01/23 1534   Altered Skin Integrity Present on Admission - Did Patient arrive to the hospital with altered skin?: yes  Side: Left  Orientation: posterior  Location: Ankle  Description of Altered Skin Integri...   Wound Image    Dressing Appearance Dry;Intact;Clean   Drainage Amount None   Appearance Maroon;Purple;Epithelialization;Closed/resurfaced   Periwound Area Scar tissue;Dry;Intact   Wound Length (cm) 0 cm   Wound Width (cm) 0 cm   Wound Depth (cm) 0 cm   Wound Volume (cm^3) 0 cm^3   Wound Surface Area (cm^2) 0 cm^2   Care Cleansed with:;Sterile normal saline   Dressing Changed;Rolled gauze   Periwound Care Dry periwound area maintained   Dressing Change Due 03/27/24        Altered Skin Integrity 08/01/23 1535 Left anterior Ankle Other (comment) Full thickness tissue loss. Base is covered by slough and/or eschar in the wound bed   Date First Assessed/Time First Assessed: 08/01/23 1535   Altered Skin Integrity Present on Admission - Did Patient arrive to the hospital with altered skin?: yes  Side: Left  Orientation: anterior  Location: Ankle  Is this injury device related?: No  ...   Wound Image    Dressing Appearance Dry;Intact;Clean   Drainage Amount None   Appearance Maroon;Red;Epithelialization;Closed/resurfaced   Periwound Area Intact;Dry;Scar tissue   Wound Length (cm) 0 cm   Wound Width (cm) 0 cm   Wound Depth (cm) 0 cm   Wound Volume (cm^3) 0 cm^3   Wound Surface Area (cm^2) 0 cm^2   Care Cleansed with:;Sterile normal saline   Dressing Changed;Rolled gauze   Dressing Change Due 03/27/24        Incision/Site 08/16/23 2011 Left Hip lateral   Date First Assessed/Time First Assessed: 08/16/23 2011   Side:  Left  Location: Hip  Orientation: lateral   Wound Image    Dressing Appearance Intact;Moist drainage   Drainage Amount Small   Drainage Characteristics/Odor Serous;No odor;Bleeding controlled   Appearance Pink;Red;Granulating   Red (%), Wound Tissue Color 100 %   Periwound Area Dry;Scar tissue   Wound Edges Defined   Wound Length (cm) 2.5 cm   Wound Width (cm) 2.3 cm   Wound Depth (cm) 1.8 cm   Wound Volume (cm^3) 10.35 cm^3   Wound Surface Area (cm^2) 5.75 cm^2   Undermining (depth (cm)/location) 2cm @ 3 o'clock/ undermining from 1 - 11 o'clock   Care Cleansed with:;Antimicrobial agent;Wound cleanser   Dressing Changed;Sodium chloride impregnated;Gauze;Absorptive Pad;Other (comment)  (vashe moistened mesalt)   Packing packed with;other (see comment)  (vashe moistened mesalt)   Periwound Care Skin barrier film applied        Incision/Site 02/27/24 1450 Right Buttocks lateral   Date First Assessed/Time First Assessed: 02/27/24 1450   Side: Right  Location: Buttocks  Orientation: lateral  Additional Comments: mesalt, quick clot, gauze, abdomen pad, and tape   Wound Image    Dressing Appearance Intact;Moist drainage   Drainage Amount Moderate   Drainage Characteristics/Odor Serosanguineous;No odor;Bleeding controlled   Appearance Red;Granulating   Red (%), Wound Tissue Color 100 %   Periwound Area Intact;Scar tissue;Moist   Wound Edges Defined   Wound Length (cm) 6.3 cm   Wound Width (cm) 12.1 cm   Wound Depth (cm) 1.5 cm   Wound Volume (cm^3) 114.345 cm^3   Wound Surface Area (cm^2) 76.23 cm^2   Undermining (depth (cm)/location) 4cm at 1 o'clock / from 12-2 o'clock   Care Cleansed with:;Antimicrobial agent;Wound cleanser   Dressing Changed;Foam;Transparent film  (black granufoam/drape)   Periwound Care Dry periwound area maintained;Hydrocolloid barrier applied;Skin barrier film applied  (mastisol for additional adhesive)   Dressing Change Due 03/28/24        Negative Pressure Wound Therapy  02/29/24 1227   Placement  Date/Time: 02/29/24 1227   Location: Sacral Spine   NPWT Type Vacuum Therapy   Therapy Setting NPWT Continuous therapy   Pressure Setting NPWT 125 mmHg   Therapy Interventions NPWT Dressing changed;Seal intact   Sponges Inserted NPWT Black;4   Sponges Removed NPWT Black;4

## 2024-03-26 LAB
ANION GAP SERPL CALC-SCNC: 8 MEQ/L
BASOPHILS # BLD AUTO: 0.02 X10(3)/MCL
BASOPHILS NFR BLD AUTO: 0.7 %
BUN SERPL-MCNC: 39.6 MG/DL (ref 8.4–25.7)
CALCIUM SERPL-MCNC: 8.7 MG/DL (ref 8.4–10.2)
CHLORIDE SERPL-SCNC: 111 MMOL/L (ref 98–107)
CO2 SERPL-SCNC: 19 MMOL/L (ref 22–29)
CREAT SERPL-MCNC: 1.43 MG/DL (ref 0.73–1.18)
CREAT/UREA NIT SERPL: 28
EOSINOPHIL # BLD AUTO: 0.18 X10(3)/MCL (ref 0–0.9)
EOSINOPHIL NFR BLD AUTO: 6.1 %
ERYTHROCYTE [DISTWIDTH] IN BLOOD BY AUTOMATED COUNT: 17.5 % (ref 11.5–17)
GFR SERPLBLD CREATININE-BSD FMLA CKD-EPI: 58 MLS/MIN/1.73/M2
GLUCOSE SERPL-MCNC: 105 MG/DL (ref 70–110)
GLUCOSE SERPL-MCNC: 108 MG/DL (ref 70–110)
GLUCOSE SERPL-MCNC: 115 MG/DL (ref 74–100)
GLUCOSE SERPL-MCNC: 137 MG/DL (ref 70–110)
GLUCOSE SERPL-MCNC: 168 MG/DL (ref 70–110)
HCT VFR BLD AUTO: 26.9 % (ref 42–52)
HGB BLD-MCNC: 8.1 G/DL (ref 14–18)
IMM GRANULOCYTES # BLD AUTO: 0 X10(3)/MCL (ref 0–0.04)
IMM GRANULOCYTES NFR BLD AUTO: 0 %
LYMPHOCYTES # BLD AUTO: 1.15 X10(3)/MCL (ref 0.6–4.6)
LYMPHOCYTES NFR BLD AUTO: 38.7 %
MCH RBC QN AUTO: 26.6 PG (ref 27–31)
MCHC RBC AUTO-ENTMCNC: 30.1 G/DL (ref 33–36)
MCV RBC AUTO: 88.5 FL (ref 80–94)
MONOCYTES # BLD AUTO: 0.18 X10(3)/MCL (ref 0.1–1.3)
MONOCYTES NFR BLD AUTO: 6.1 %
NEUTROPHILS # BLD AUTO: 1.44 X10(3)/MCL (ref 2.1–9.2)
NEUTROPHILS NFR BLD AUTO: 48.4 %
PLATELET # BLD AUTO: 74 X10(3)/MCL (ref 130–400)
PMV BLD AUTO: 10.7 FL (ref 7.4–10.4)
POTASSIUM SERPL-SCNC: 5.1 MMOL/L (ref 3.5–5.1)
RBC # BLD AUTO: 3.04 X10(6)/MCL (ref 4.7–6.1)
SODIUM SERPL-SCNC: 138 MMOL/L (ref 136–145)
TOBRAMYCIN TROUGH SERPL-MCNC: 2.1 UG/ML (ref 0–2)
WBC # SPEC AUTO: 2.97 X10(3)/MCL (ref 4.5–11.5)

## 2024-03-26 PROCEDURE — 25000003 PHARM REV CODE 250: Performed by: INTERNAL MEDICINE

## 2024-03-26 PROCEDURE — A4216 STERILE WATER/SALINE, 10 ML: HCPCS | Performed by: INTERNAL MEDICINE

## 2024-03-26 PROCEDURE — 27000207 HC ISOLATION

## 2024-03-26 PROCEDURE — 25000003 PHARM REV CODE 250: Performed by: STUDENT IN AN ORGANIZED HEALTH CARE EDUCATION/TRAINING PROGRAM

## 2024-03-26 PROCEDURE — 63600175 PHARM REV CODE 636 W HCPCS: Performed by: STUDENT IN AN ORGANIZED HEALTH CARE EDUCATION/TRAINING PROGRAM

## 2024-03-26 PROCEDURE — 94760 N-INVAS EAR/PLS OXIMETRY 1: CPT

## 2024-03-26 PROCEDURE — 25000003 PHARM REV CODE 250: Performed by: PHYSICIAN ASSISTANT

## 2024-03-26 PROCEDURE — 80048 BASIC METABOLIC PNL TOTAL CA: CPT | Performed by: PHYSICIAN ASSISTANT

## 2024-03-26 PROCEDURE — 85025 COMPLETE CBC W/AUTO DIFF WBC: CPT | Performed by: PHYSICIAN ASSISTANT

## 2024-03-26 PROCEDURE — 97530 THERAPEUTIC ACTIVITIES: CPT

## 2024-03-26 PROCEDURE — 99900035 HC TECH TIME PER 15 MIN (STAT)

## 2024-03-26 PROCEDURE — 11000004 HC SNF PRIVATE

## 2024-03-26 PROCEDURE — 80200 ASSAY OF TOBRAMYCIN: CPT | Performed by: STUDENT IN AN ORGANIZED HEALTH CARE EDUCATION/TRAINING PROGRAM

## 2024-03-26 RX ADMIN — SODIUM CHLORIDE, PRESERVATIVE FREE 10 ML: 5 INJECTION INTRAVENOUS at 11:03

## 2024-03-26 RX ADMIN — CETIRIZINE HYDROCHLORIDE 10 MG: 10 TABLET, FILM COATED ORAL at 08:03

## 2024-03-26 RX ADMIN — HYDROCODONE BITARTRATE AND ACETAMINOPHEN 1 TABLET: 7.5; 325 TABLET ORAL at 03:03

## 2024-03-26 RX ADMIN — LINEZOLID 600 MG: 600 TABLET, FILM COATED ORAL at 09:03

## 2024-03-26 RX ADMIN — DILTIAZEM HYDROCHLORIDE 30 MG: 30 TABLET, FILM COATED ORAL at 05:03

## 2024-03-26 RX ADMIN — INSULIN DETEMIR 15 UNITS: 100 INJECTION, SOLUTION SUBCUTANEOUS at 09:03

## 2024-03-26 RX ADMIN — ACETAMINOPHEN 650 MG: 325 TABLET ORAL at 09:03

## 2024-03-26 RX ADMIN — SODIUM CHLORIDE: 9 INJECTION, SOLUTION INTRAVENOUS at 09:03

## 2024-03-26 RX ADMIN — MICONAZOLE NITRATE: 20 CREAM TOPICAL at 09:03

## 2024-03-26 RX ADMIN — SODIUM CHLORIDE, PRESERVATIVE FREE 10 ML: 5 INJECTION INTRAVENOUS at 05:03

## 2024-03-26 RX ADMIN — FERROUS SULFATE TAB 325 MG (65 MG ELEMENTAL FE) 1 EACH: 325 (65 FE) TAB at 08:03

## 2024-03-26 RX ADMIN — OXYCODONE HYDROCHLORIDE AND ACETAMINOPHEN 500 MG: 500 TABLET ORAL at 08:03

## 2024-03-26 RX ADMIN — MICONAZOLE NITRATE: 20 CREAM TOPICAL at 08:03

## 2024-03-26 RX ADMIN — DILTIAZEM HYDROCHLORIDE 30 MG: 30 TABLET, FILM COATED ORAL at 11:03

## 2024-03-26 RX ADMIN — SITAGLIPTIN 100 MG: 50 TABLET, FILM COATED ORAL at 08:03

## 2024-03-26 RX ADMIN — LOPERAMIDE HYDROCHLORIDE 4 MG: 2 CAPSULE ORAL at 08:03

## 2024-03-26 RX ADMIN — Medication 1 CAPSULE: at 11:03

## 2024-03-26 RX ADMIN — Medication 1 CAPSULE: at 05:03

## 2024-03-26 RX ADMIN — COLLAGENASE SANTYL: 250 OINTMENT TOPICAL at 08:03

## 2024-03-26 RX ADMIN — PANTOPRAZOLE SODIUM 40 MG: 40 TABLET, DELAYED RELEASE ORAL at 08:03

## 2024-03-26 RX ADMIN — TOBRAMYCIN SULFATE 300 MG: 40 INJECTION, SOLUTION INTRAMUSCULAR; INTRAVENOUS at 09:03

## 2024-03-26 RX ADMIN — LINEZOLID 600 MG: 600 TABLET, FILM COATED ORAL at 08:03

## 2024-03-26 RX ADMIN — Medication 1 CAPSULE: at 08:03

## 2024-03-26 NOTE — PT/OT/SLP PROGRESS
Physical Therapy Treatment Note           Name: Antonio Galvan II    : 1965 (58 y.o.)  MRN: [id number]           TREATMENT SUMMARY AND RECOMMENDATIONS:    PT Received On: 24  PT Start Time: 1103     PT Stop Time: 1129  PT Total Time (min): 26 min     Subjective Assessment:   No complaints  Lethargic   x Awake, alert, cooperative  Uncooperative    Agitated  c/o pain    Appropriate  c/o fatigue    Confused x Treated at bedside     Emotionally labile  Treated in gym/dept.    Impulsive  Other:    Flat affect       Therapy Precautions:    Cognitive deficits  Spinal precautions    Collar - hard  Sternal precautions    Collar - soft   TLSO    Fall risk  LSO    Hip precautions - posterior  Knee immobilizer    Hip precautions - anterior  WBAT    Impaired communication  Partial weightbearing    Oxygen  TTWB    PEG tube  NWB    Visual deficits x Other: Wound Vac and Super pubic cath    Hearing deficits  x OK 'd to get in chair 2-3 hours per day       Treatment Objectives:     Mobility Training:   Assist level Comments    Bed mobility Total A Rolling side to side for tess placement and lower body dressing    Transfer Total A Tess from Bed>Chair; x 2-3 person assistance for proper positioning in chair - Due to 0/5 mm activation 1 person has to guide/protect feet, one maintains wound vac and catheter and one guides the tess.    Gait     Sit to stand transitions     Sitting balance     Standing balance      Wheelchair mobility     Car transfer     Other:          Therapeutic Exercise:   Exercise Sets Reps Comments                               Additional Comments:  Extra time needed for proper positioning in WC once OOB. BP was high with nursing in room, medication provided.     Will continue caregiver training on tess; trying to reduce from 3 person assistance    Assessment: Patient tolerated session well - up in chair and outside.     PT Plan: Continue per POC  Revisions made to plan of care: no      GOALS:   Multidisciplinary Problems       Physical Therapy Goals          Problem: Physical Therapy    Goal Priority Disciplines Outcome Goal Variances Interventions   Physical Therapy Goal     PT, PT/OT Ongoing, Progressing     Description: Goals to be met by: Discharge     Pt to be seen for ROM tasks 1-2 x/day to ensure proper positioning can be obtained to overall reduce impacts of prolonged bed rest and skin break down    Progress pending healing stages of current wounds    Patient will increase functional independence with mobility by performin. Pt to tolerate PROM tasks to B UE and LE, while obtaining 25% improvement in range.   2. Sitting EOB to tolerance pending wound healing - with pt demonstrating normal BP  3. Progress to OOB into chair once able. - Met  4. Pt to tolerate up to 3 hours in power chair - Met  5. Caregivers to be able to use tess with 1-2 people.                          Skilled PT Minutes Provided: 25   Communication with Treatment Team:     Equipment recommendations:       At end of treatment, patient remained:   Up in chair    x Up in wheelchair in room    In bed    With alarm activated    Bed rails up    Call bell in reach    x Family/friends present    Restraints secured properly    In bathroom with CNA/RN notified   x Nurse aware    In gym with therapist/tech    Other:

## 2024-03-26 NOTE — PLAN OF CARE
Problem: Adult Inpatient Plan of Care  Goal: Plan of Care Review  Outcome: Ongoing, Progressing  Flowsheets (Taken 3/26/2024 0013)  Plan of Care Reviewed With: patient  Goal: Patient-Specific Goal (Individualized)  Outcome: Ongoing, Progressing  Flowsheets (Taken 3/26/2024 0013)  Anxieties, Fears or Concerns: none  Individualized Care Needs: turn q 2 , IVF. monitor wound vac  Goal: Absence of Hospital-Acquired Illness or Injury  Outcome: Ongoing, Progressing  Intervention: Identify and Manage Fall Risk  Flowsheets (Taken 3/26/2024 0013)  Safety Promotion/Fall Prevention:   assistive device/personal item within reach   family to remain at bedside   room near unit station   side rails raised x 3  Intervention: Prevent Skin Injury  Flowsheets (Taken 3/26/2024 0013)  Body Position: side-lying  Skin Protection: adhesive use limited  Intervention: Prevent and Manage VTE (Venous Thromboembolism) Risk  Flowsheets (Taken 3/26/2024 0013)  Activity Management: Rolling - L1  VTE Prevention/Management: bleeding precations maintained  Range of Motion: ROM (range of motion) performed  Intervention: Prevent Infection  Flowsheets (Taken 3/26/2024 0013)  Infection Prevention:   cohorting utilized   personal protective equipment utilized   rest/sleep promoted  Goal: Optimal Comfort and Wellbeing  Outcome: Ongoing, Progressing  Intervention: Monitor Pain and Promote Comfort  Flowsheets (Taken 3/26/2024 0013)  Pain Management Interventions:   care clustered   medication offered   quiet environment facilitated  Intervention: Provide Person-Centered Care  Flowsheets (Taken 3/26/2024 0013)  Trust Relationship/Rapport:   care explained   choices provided   questions answered   reassurance provided   thoughts/feelings acknowledged  Goal: Readiness for Transition of Care  Outcome: Ongoing, Progressing  Intervention: Mutually Develop Transition Plan  Flowsheets (Taken 3/26/2024 0013)  Equipment Currently Used at Home:   hospital bed   lift  device   power chair  Transportation Anticipated: family or friend will provide  Who are your caregiver(s) and their phone number(s)?: [phone] Judy (mother)  Communicated SADE with patient/caregiver: Date not available/Unable to determine  Do you expect to return to your current living situation?: Yes  Do you have help at home or someone to help you manage your care at home?: Yes  Readmission within 30 days?: No  Do you currently have service(s) that help you manage your care at home?: No  Is the pt/caregiver preference to resume services with current agency: No     Problem: Diabetes Comorbidity  Goal: Blood Glucose Level Within Targeted Range  Outcome: Ongoing, Progressing  Intervention: Monitor and Manage Glycemia  Flowsheets (Taken 3/26/2024 0013)  Glycemic Management:   blood glucose monitored   supplemental insulin given     Problem: Skin Injury Risk Increased  Goal: Skin Health and Integrity  Outcome: Ongoing, Progressing  Intervention: Optimize Skin Protection  Flowsheets (Taken 3/26/2024 0013)  Pressure Reduction Techniques: frequent weight shift encouraged  Pressure Reduction Devices:   heel offloading device utilized   positioning supports utilized   specialty bed utilized  Skin Protection: adhesive use limited  Head of Bed (HOB) Positioning: HOB at 30 degrees  Intervention: Promote and Optimize Oral Intake  Flowsheets (Taken 3/26/2024 0013)  Oral Nutrition Promotion: calorie-dense foods provided     Problem: Impaired Wound Healing  Goal: Optimal Wound Healing  Outcome: Ongoing, Progressing  Intervention: Promote Wound Healing  Flowsheets (Taken 3/26/2024 0013)  Oral Nutrition Promotion: calorie-dense foods provided  Sleep/Rest Enhancement:   awakenings minimized   relaxation techniques promoted   noise level reduced   room darkened  Activity Management: Rolling - L1  Pain Management Interventions:   care clustered   medication offered   quiet environment facilitated     Problem: Fall Injury Risk  Goal:  Absence of Fall and Fall-Related Injury  Outcome: Ongoing, Progressing  Intervention: Identify and Manage Contributors  Flowsheets (Taken 3/26/2024 0013)  Self-Care Promotion:   safe use of adaptive equipment encouraged   BADL personal objects within reach  Medication Review/Management: medications reviewed  Intervention: Promote Injury-Free Environment  Flowsheets (Taken 3/26/2024 0013)  Safety Promotion/Fall Prevention:   assistive device/personal item within reach   family to remain at bedside   room near unit station   side rails raised x 3     Problem: Infection  Goal: Absence of Infection Signs and Symptoms  Outcome: Ongoing, Progressing  Intervention: Prevent or Manage Infection  Flowsheets (Taken 3/26/2024 0013)  Fever Reduction/Comfort Measures:   lightweight bedding   lightweight clothing  Infection Management: aseptic technique maintained  Isolation Precautions: protective

## 2024-03-26 NOTE — PT/OT/SLP PROGRESS
Physical Therapy Treatment Note           Name: Antonio Galvan II    : 1965 (58 y.o.)  MRN: [id number]           TREATMENT SUMMARY AND RECOMMENDATIONS:    PT Received On: 24  PT Start Time: 1501     PT Stop Time: 1520  PT Total Time (min): 19 min     Subjective Assessment:   No complaints  Lethargic   x Awake, alert, cooperative  Uncooperative    Agitated  c/o pain    Appropriate  c/o fatigue    Confused x Treated at bedside     Emotionally labile  Treated in gym/dept.    Impulsive  Other:    Flat affect       Therapy Precautions:    Cognitive deficits  Spinal precautions    Collar - hard  Sternal precautions    Collar - soft   TLSO    Fall risk  LSO    Hip precautions - posterior  Knee immobilizer    Hip precautions - anterior  WBAT    Impaired communication  Partial weightbearing    Oxygen  TTWB    PEG tube  NWB    Visual deficits x Other: Wound Vac and Super pubic cath    Hearing deficits  x OK 'd to get in chair 2-3 hours per day       Treatment Objectives:     Mobility Training:   Assist level Comments    Bed mobility Total A Rolling side to side for tess pad removal and doffing pants. Proper bed positioning completed with pt in supine using pillow for UE and LE to reduce skin break down.    Transfer Total A Tess from chair>bed; x 2-3 person assistance for maintenance of cath, wound vac and B LE   Gait     Sit to stand transitions     Sitting balance     Standing balance      Wheelchair mobility     Car transfer              Therapeutic Exercise:   Exercise Sets Reps Comments                               Additional Comments:  Extra time needed for proper positioning once back in bed. R side lying for nursing wound care.  Will continue caregiver training on tess and safety with slide board (this is what they were doing at home)    Assessment: Patient tolerated session well , but still needing 2-3 person assistance for positioning during tess.     PT Plan: Continue per POC  Revisions  made to plan of care: No    GOALS:   Multidisciplinary Problems       Physical Therapy Goals          Problem: Physical Therapy    Goal Priority Disciplines Outcome Goal Variances Interventions   Physical Therapy Goal     PT, PT/OT Ongoing, Progressing     Description: Goals to be met by: Discharge     Pt to be seen for ROM tasks 1-2 x/day to ensure proper positioning can be obtained to overall reduce impacts of prolonged bed rest and skin break down    Progress pending healing stages of current wounds    Patient will increase functional independence with mobility by performin. Pt to tolerate PROM tasks to B UE and LE, while obtaining 25% improvement in range.   2. Sitting EOB to tolerance pending wound healing - with pt demonstrating normal BP  3. Progress to OOB into chair once able. - Met  4. Pt to tolerate up to 3 hours in power chair - Met  5. Caregivers to be able to use tess with 1-2 people.                          Skilled PT Minutes Provided: 19   Communication with Treatment Team:     Equipment recommendations:       At end of treatment, patient remained:   Up in chair     Up in wheelchair in room   x In bed    With alarm activated    Bed rails up   x Call bell in reach    x Family/friends present    Restraints secured properly    In bathroom with CNA/RN notified   x Nurse aware    In gym with therapist/tech    Other:

## 2024-03-26 NOTE — PLAN OF CARE
Problem: Adult Inpatient Plan of Care  Goal: Plan of Care Review  Outcome: Ongoing, Progressing  Flowsheets (Taken 3/26/2024 1201)  Plan of Care Reviewed With:   patient   caregiver  Goal: Patient-Specific Goal (Individualized)  Outcome: Ongoing, Progressing  Flowsheets (Taken 3/26/2024 1201)  Anxieties, Fears or Concerns: none expressed  Individualized Care Needs: turn q2, PT/OT, monitor wound vac, contact precautions maintained, wound care     Problem: Diabetes Comorbidity  Goal: Blood Glucose Level Within Targeted Range  Outcome: Ongoing, Progressing  Intervention: Monitor and Manage Glycemia  Flowsheets (Taken 3/26/2024 1201)  Glycemic Management: blood glucose monitored     Problem: Skin Injury Risk Increased  Goal: Skin Health and Integrity  Outcome: Ongoing, Progressing  Intervention: Optimize Skin Protection  Flowsheets (Taken 3/26/2024 1201)  Pressure Reduction Techniques: positioned off wounds  Pressure Reduction Devices: positioning supports utilized  Skin Protection: adhesive use limited  Head of Bed (HOB) Positioning: HOB at 30 degrees  Intervention: Promote and Optimize Oral Intake  Flowsheets (Taken 3/26/2024 1201)  Oral Nutrition Promotion: physical activity promoted     Problem: Impaired Wound Healing  Goal: Optimal Wound Healing  Outcome: Ongoing, Progressing  Intervention: Promote Wound Healing  Flowsheets (Taken 3/26/2024 1201)  Oral Nutrition Promotion: physical activity promoted  Sleep/Rest Enhancement:   awakenings minimized   natural light exposure provided   relaxation techniques promoted  Activity Management: Up in chair - L3  Pain Management Interventions:   care clustered   medication offered but refused     Problem: Infection  Goal: Absence of Infection Signs and Symptoms  Outcome: Ongoing, Progressing  Intervention: Prevent or Manage Infection  Flowsheets (Taken 3/26/2024 1201)  Fever Reduction/Comfort Measures:   lightweight bedding   lightweight clothing  Infection Management:  aseptic technique maintained  Isolation Precautions:   precautions maintained   contact

## 2024-03-26 NOTE — PROGRESS NOTES
[facility]  [facility] MEDICINE ~ PROGRESS NOTE  CHIEF COMPLAINT     Hospital follow up for nonhealing wound    HOSPITAL COURSE     58-year-old man with quadriplegic spinal paralysis and numerous decubitus ulcers who is followed by wound care is brought in today due to fever. He had wound cultures done 3 days ago that have grown Proteus mirabilis and Klebsiella pneumoniae. Sensitivities are not complete. The wounds were noted to have significant odor and heavy green drainage. In addition the patient had some nausea and vomiting and home health noted that his blood pressure was dipping. He was advised to come to the emergency room. Wound cultures were drawn from the right hip wound. His wife reports that when the wound is pressed above pus comes out. Both Klebsiella and Proteus maybe treated with fluoroquinolones which are both IV and oral. The patient's wife preferred if the patient came home so that she could attend to his wounds however the patient expressed a desire to be admitted.     Today:  No reported complaints today.  Heart rate improved on exam.  Renal function improved today, will discontinue fluids.  Glucose remained stable.    OBJECTIVE/PHYSICAL EXAM     VITAL SIGNS (MOST RECENT):  Temp: 97.8 °F (36.6 °C) (03/26/24 0523)  Pulse: (!) 117 (03/26/24 0523)  Resp: 18 (03/25/24 2000)  BP: 113/75 (03/26/24 0523)  SpO2: 100 % (03/26/24 0523) VITAL SIGNS (24 HOUR RANGE):  Temp:  [97.7 °F (36.5 °C)-97.8 °F (36.6 °C)] 97.8 °F (36.6 °C)  Pulse:  [117-124] 117  Resp:  [18] 18  SpO2:  [99 %-100 %] 100 %  BP: (113-131)/(75-90) 113/75     GENERAL: In no acute distress, afebrile  HEENT:  PERRLA  CHEST: Clear to auscultation bilaterally  HEART: S1, S2, no appreciable murmur  ABDOMEN: Soft, nontender, BS +  MSK: Warm, no lower extremity edema, no clubbing or cyanosis  NEUROLOGIC: Alert and oriented x4  INTEGUMENTARY:  See media for wounds    ASSESSMENT/PLAN     Nonhealing chronic wound   VRE,  Proteus, Pseudomonas, Klebsiella  S/p debridement with Dr. Jean on 02/27/2024  Zyvox and Tobramycin - will need 6 weeks of treatment given exposed bone 03/02/2024-04/12/2024  Wound VAC and wound care  Pre-albumin now wnl   Appreciate dietary recommendations for supplements     Atrial Fibrillation  Hypertension/Hypotension   Diltiazem started 3/16/2024 and has required adjustments, increase to 30mg Q6 03/25/2024  No AC 2/2 patient request as he has has prior episodes of acute blood loss from wounds - aspirin d/c 02/28/2024  Hypertensive episodes are associated with pain    Post op anemia   H&H improved s/p 2u pRBC 02/28/2024 and 1u pRBC on 02/29/2024  No anticoagulation at this time - aspirin d/c 02/28/2024     Hyperglycemia/hypoglycemia in setting of DM  A1c 02/23/2024 8.5%  Reports taking 80u Levemir BID  Continue home Sitagliptin  Continue titrating insulin regimen as needed    GISSEL/Hyperkalemia  Stop IV fluids as renal function improving  Declined dose of Kayexalate yesterday    Thrombocytopenia   Peripheral smear without significant findings     Left upper extremity swelling  Ultrasound without DVT     Hx of: Neurogenic bladder, Quadriplegia     DVT prophylaxis:  SCDs, history of bleeding in the recent past    Anticipated discharge and disposition: home with [facility] when antibiotics are complete on 04/12/2024  __________________________________________________________________________    LABS/MICRO/MEDS/DIAGNOSTICS     LABS  Recent Labs     03/24/24  0522 03/25/24  0759 03/26/24  0541      < > 138   K 5.2*   < > 5.1   CHLORIDE 110*   < > 111*   CO2 20*   < > 19*   BUN 42.9*   < > 39.6*   CREATININE 1.56*   < > 1.43*   GLUCOSE 136*   < > 115*   CALCIUM 8.4   < > 8.7   ALKPHOS 61  --   --    AST 6  --   --    ALT 10  --   --    ALBUMIN 2.3*  --   --     < > = values in this interval not displayed.     Recent Labs     03/26/24  0541   WBC 2.97*   RBC 3.04*   HCT 26.9*   MCV 88.5   PLT 74*      MICROBIOLOGY  Microbiology Results (last 7 days)       Procedure Component Value Units Date/Time    Direct Prep (Skin, Hair, Nails) [[id number]] Collected: 03/20/24 1413    Order Status: Completed Specimen: Skin Updated: 03/21/24 0152     KOH Prep No fungal elements seen             MEDICATIONS   acetaminophen  650 mg Oral QHS    ascorbic acid (vitamin C)  500 mg Oral Daily    cetirizine  10 mg Oral Daily    collagenase   Topical (Top) Daily    diltiaZEM  30 mg Oral Q6H    ferrous sulfate  1 tablet Oral Daily    fluconazole  200 mg Oral Weekly    insulin detemir U-100  15 Units Subcutaneous QHS    Lactobacillus acidophilus  1 capsule Oral TID WM    linezolid  600 mg Oral Q12H    loperamide  4 mg Oral Daily    miconazole   Topical (Top) BID    pantoprazole  40 mg Oral Daily    SITagliptin phosphate  100 mg Oral Daily    sodium chloride 0.9%  10 mL Intravenous Q6H    sodium polystyrene sulfonate  30 g Oral Once    tobramycin IV (PEDS and ADULTS)  300 mg Intravenous Q48H         INFUSIONS       DIAGNOSTIC TESTS  US Upper Extremity Veins Left   Final Result      No venous thrombosis identified.         Electronically signed by: Andrade Perez   Date:    03/18/2024   Time:    21:08      X-Ray Chest 1 View   Final Result      Left PICC tip overlies the mid SVC.         Electronically signed by: Oj Solomon   Date:    02/28/2024   Time:    14:29         EF   Date Value Ref Range Status   05/09/2023 60 % Final   07/18/2022 40 % Final        NUTRITION STATUS  Patient meets ASPEN criteria for   malnutrition of   per RD assessment as evidenced by:                       A minimum of two characteristics is recommended for diagnosis of either severe or non-severe malnutrition.     Case related differential diagnoses have been reviewed; assessment and plan has been documented. I have personally reviewed the labs and test results that are currently available; I have reviewed the patients medication list. I have reviewed the  consulting providers recommendations. I have reviewed or attempted to review medical records based upon their availability.  All of the patient's and/or family's questions have been addressed and answered to the best of my ability.  I will continue to monitor closely and make adjustments to medical management as needed.  This document was created using M*Modal Fluency Direct.  Transcription errors may have been made.  Please contact me if any questions may rise regarding documentation to clarify transcription.      ANTHONY Vazquez   Internal Medicine  Department of Hospital Medicine [facility]

## 2024-03-27 LAB
ANION GAP SERPL CALC-SCNC: 9 MEQ/L
BUN SERPL-MCNC: 37.3 MG/DL (ref 8.4–25.7)
CALCIUM SERPL-MCNC: 8.5 MG/DL (ref 8.4–10.2)
CHLORIDE SERPL-SCNC: 110 MMOL/L (ref 98–107)
CO2 SERPL-SCNC: 19 MMOL/L (ref 22–29)
CREAT SERPL-MCNC: 1.35 MG/DL (ref 0.73–1.18)
CREAT/UREA NIT SERPL: 28
GFR SERPLBLD CREATININE-BSD FMLA CKD-EPI: >60 MLS/MIN/1.73/M2
GLUCOSE SERPL-MCNC: 101 MG/DL (ref 70–110)
GLUCOSE SERPL-MCNC: 105 MG/DL (ref 70–110)
GLUCOSE SERPL-MCNC: 110 MG/DL (ref 70–110)
GLUCOSE SERPL-MCNC: 118 MG/DL (ref 74–100)
GLUCOSE SERPL-MCNC: 129 MG/DL (ref 70–110)
POTASSIUM SERPL-SCNC: 5.2 MMOL/L (ref 3.5–5.1)
POTASSIUM SERPL-SCNC: 5.6 MMOL/L (ref 3.5–5.1)
SODIUM SERPL-SCNC: 138 MMOL/L (ref 136–145)

## 2024-03-27 PROCEDURE — 27000207 HC ISOLATION

## 2024-03-27 PROCEDURE — 80048 BASIC METABOLIC PNL TOTAL CA: CPT | Performed by: STUDENT IN AN ORGANIZED HEALTH CARE EDUCATION/TRAINING PROGRAM

## 2024-03-27 PROCEDURE — 25000003 PHARM REV CODE 250: Performed by: PHYSICIAN ASSISTANT

## 2024-03-27 PROCEDURE — 94760 N-INVAS EAR/PLS OXIMETRY 1: CPT

## 2024-03-27 PROCEDURE — 25000003 PHARM REV CODE 250: Performed by: INTERNAL MEDICINE

## 2024-03-27 PROCEDURE — 63600175 PHARM REV CODE 636 W HCPCS: Performed by: PHYSICIAN ASSISTANT

## 2024-03-27 PROCEDURE — 84132 ASSAY OF SERUM POTASSIUM: CPT | Performed by: PHYSICIAN ASSISTANT

## 2024-03-27 PROCEDURE — A4216 STERILE WATER/SALINE, 10 ML: HCPCS | Performed by: INTERNAL MEDICINE

## 2024-03-27 PROCEDURE — 25000003 PHARM REV CODE 250: Performed by: STUDENT IN AN ORGANIZED HEALTH CARE EDUCATION/TRAINING PROGRAM

## 2024-03-27 PROCEDURE — 97530 THERAPEUTIC ACTIVITIES: CPT | Mod: CQ

## 2024-03-27 PROCEDURE — 63600175 PHARM REV CODE 636 W HCPCS: Performed by: STUDENT IN AN ORGANIZED HEALTH CARE EDUCATION/TRAINING PROGRAM

## 2024-03-27 PROCEDURE — 11000004 HC SNF PRIVATE

## 2024-03-27 PROCEDURE — 99900035 HC TECH TIME PER 15 MIN (STAT)

## 2024-03-27 RX ADMIN — SODIUM CHLORIDE, PRESERVATIVE FREE 10 ML: 5 INJECTION INTRAVENOUS at 05:03

## 2024-03-27 RX ADMIN — MICONAZOLE NITRATE: 20 CREAM TOPICAL at 09:03

## 2024-03-27 RX ADMIN — DILTIAZEM HYDROCHLORIDE 30 MG: 30 TABLET, FILM COATED ORAL at 05:03

## 2024-03-27 RX ADMIN — ACETAMINOPHEN 650 MG: 325 TABLET ORAL at 09:03

## 2024-03-27 RX ADMIN — DILTIAZEM HYDROCHLORIDE 30 MG: 30 TABLET, FILM COATED ORAL at 11:03

## 2024-03-27 RX ADMIN — PANTOPRAZOLE SODIUM 40 MG: 40 TABLET, DELAYED RELEASE ORAL at 08:03

## 2024-03-27 RX ADMIN — ONDANSETRON 4 MG: 2 INJECTION INTRAMUSCULAR; INTRAVENOUS at 11:03

## 2024-03-27 RX ADMIN — SITAGLIPTIN 100 MG: 50 TABLET, FILM COATED ORAL at 08:03

## 2024-03-27 RX ADMIN — SODIUM ZIRCONIUM CYCLOSILICATE 5 G: 5 POWDER, FOR SUSPENSION ORAL at 04:03

## 2024-03-27 RX ADMIN — MICONAZOLE NITRATE: 20 CREAM TOPICAL at 08:03

## 2024-03-27 RX ADMIN — LINEZOLID 600 MG: 600 TABLET, FILM COATED ORAL at 08:03

## 2024-03-27 RX ADMIN — CETIRIZINE HYDROCHLORIDE 10 MG: 10 TABLET, FILM COATED ORAL at 08:03

## 2024-03-27 RX ADMIN — Medication 1 CAPSULE: at 08:03

## 2024-03-27 RX ADMIN — Medication 1 CAPSULE: at 05:03

## 2024-03-27 RX ADMIN — SODIUM CHLORIDE, PRESERVATIVE FREE 10 ML: 5 INJECTION INTRAVENOUS at 11:03

## 2024-03-27 RX ADMIN — OXYCODONE HYDROCHLORIDE AND ACETAMINOPHEN 500 MG: 500 TABLET ORAL at 08:03

## 2024-03-27 RX ADMIN — INSULIN DETEMIR 15 UNITS: 100 INJECTION, SOLUTION SUBCUTANEOUS at 09:03

## 2024-03-27 RX ADMIN — LINEZOLID 600 MG: 600 TABLET, FILM COATED ORAL at 09:03

## 2024-03-27 RX ADMIN — FLUCONAZOLE 200 MG: 200 TABLET ORAL at 08:03

## 2024-03-27 RX ADMIN — HYDROCODONE BITARTRATE AND ACETAMINOPHEN 1 TABLET: 7.5; 325 TABLET ORAL at 10:03

## 2024-03-27 RX ADMIN — Medication 1 CAPSULE: at 11:03

## 2024-03-27 RX ADMIN — FERROUS SULFATE TAB 325 MG (65 MG ELEMENTAL FE) 1 EACH: 325 (65 FE) TAB at 08:03

## 2024-03-27 RX ADMIN — COLLAGENASE SANTYL: 250 OINTMENT TOPICAL at 08:03

## 2024-03-27 RX ADMIN — LOPERAMIDE HYDROCHLORIDE 4 MG: 2 CAPSULE ORAL at 08:03

## 2024-03-27 NOTE — PROGRESS NOTES
[facility] - Medical Surgical Unit  [facility] MEDICINE ~ PROGRESS NOTE  CHIEF COMPLAINT     Hospital follow up for nonhealing wound    HOSPITAL COURSE     58-year-old man with quadriplegic spinal paralysis and numerous decubitus ulcers who is followed by wound care is brought in today due to fever. He had wound cultures done 3 days ago that have grown Proteus mirabilis and Klebsiella pneumoniae. Sensitivities are not complete. The wounds were noted to have significant odor and heavy green drainage. In addition the patient had some nausea and vomiting and home health noted that his blood pressure was dipping. He was advised to come to the emergency room. Wound cultures were drawn from the right hip wound. His wife reports that when the wound is pressed above pus comes out. Both Klebsiella and Proteus maybe treated with fluoroquinolones which are both IV and oral. The patient's wife preferred if the patient came home so that she could attend to his wounds however the patient expressed a desire to be admitted.     Today:  Renal function improved today, but potassium remains high.  Patient has been refusing Kayexalate as he does not want have a bowel movement in R[place] in wound VAC dressings.  Patient did have scheduled bowel movement this morning, we will repeat potassium and if remains elevated we will give Lokelma later this afternoon.    OBJECTIVE/PHYSICAL EXAM     VITAL SIGNS (MOST RECENT):  Temp: 98.7 °F (37.1 °C) (03/27/24 1013)  Pulse: 86 (03/27/24 1013)  Resp: 20 (03/27/24 1016)  BP: 113/76 (03/27/24 1013)  SpO2: 96 % (03/27/24 1013) VITAL SIGNS (24 HOUR RANGE):  Temp:  [98.2 °F (36.8 °C)-98.7 °F (37.1 °C)] 98.7 °F (37.1 °C)  Pulse:  [] 86  Resp:  [18-20] 20  SpO2:  [96 %-99 %] 96 %  BP: (113-149)/(76-98) 113/76     GENERAL: In no acute distress, afebrile  HEENT:  PERRLA  CHEST: Clear to auscultation bilaterally  HEART: S1, S2, no appreciable murmur  ABDOMEN: Soft, nontender, BS +  MSK: Warm, no lower  extremity edema, no clubbing or cyanosis  NEUROLOGIC: Alert and oriented x4  INTEGUMENTARY:  See media for wounds    ASSESSMENT/PLAN     Nonhealing chronic wound   VRE, Proteus, Pseudomonas, Klebsiella  S/p debridement with Dr. Jean on 02/27/2024  Zyvox and Tobramycin - will need 6 weeks of treatment given exposed bone 03/02/2024-04/12/2024  Wound VAC and wound care  Appreciate dietary recommendations for supplements     Atrial Fibrillation  Hypertension/Hypotension   Diltiazem started 3/16/2024 and has required adjustments, increase to 30mg Q6 03/25/2024  No AC 2/2 patient request as he has has prior episodes of acute blood loss from wounds - aspirin d/c 02/28/2024  Hypertensive episodes are associated with pain    Post op anemia   H&H improved s/p 2u pRBC 02/28/2024 and 1u pRBC on 02/29/2024  No anticoagulation at this time - aspirin d/c 02/28/2024     Hyperglycemia/hypoglycemia in setting of DM  A1c 02/23/2024 8.5%  Reports taking 80u Levemir BID  Continue home Sitagliptin  Continue titrating insulin regimen as needed    GISSEL - improving   Hyperkalemia  Renal function improving  Potassium remains high, had bowel movement earlier this morning  Repeat potassium at 2:00 p.m. today and if remains elevated we will give Lokelma  Patient continues to decline Kayexalate    Thrombocytopenia   Peripheral smear without significant findings     Left upper extremity swelling  Ultrasound without DVT     Hx of: Neurogenic bladder, Quadriplegia     DVT prophylaxis:  SCDs, history of bleeding in the recent past    Anticipated discharge and disposition: home with [facility] when antibiotics are complete on 04/12/2024  __________________________________________________________________________    LABS/MICRO/MEDS/DIAGNOSTICS     LABS  Recent Labs     03/27/24  0840      K 5.6*   CHLORIDE 110*   CO2 19*   BUN 37.3*   CREATININE 1.35*   GLUCOSE 118*   CALCIUM 8.5     Recent Labs     03/26/24  0541   WBC 2.97*   RBC 3.04*   HCT  26.9*   MCV 88.5   PLT 74*     MICROBIOLOGY  Microbiology Results (last 7 days)       Procedure Component Value Units Date/Time    Direct Prep (Skin, Hair, Nails) [[id number]] Collected: 03/20/24 1413    Order Status: Completed Specimen: Skin Updated: 03/21/24 0152     KOH Prep No fungal elements seen             MEDICATIONS   acetaminophen  650 mg Oral QHS    ascorbic acid (vitamin C)  500 mg Oral Daily    cetirizine  10 mg Oral Daily    collagenase   Topical (Top) Daily    diltiaZEM  30 mg Oral Q6H    ferrous sulfate  1 tablet Oral Daily    fluconazole  200 mg Oral Weekly    insulin detemir U-100  15 Units Subcutaneous QHS    Lactobacillus acidophilus  1 capsule Oral TID WM    linezolid  600 mg Oral Q12H    loperamide  4 mg Oral Daily    miconazole   Topical (Top) BID    pantoprazole  40 mg Oral Daily    SITagliptin phosphate  100 mg Oral Daily    sodium chloride 0.9%  10 mL Intravenous Q6H    sodium polystyrene sulfonate  30 g Oral Once    [START ON 3/29/2024] tobramycin IV (PEDS and ADULTS)  300 mg Intravenous Q72H         INFUSIONS       DIAGNOSTIC TESTS  US Upper Extremity Veins Left   Final Result      No venous thrombosis identified.         Electronically signed by: Andrade Perez   Date:    03/18/2024   Time:    21:08      X-Ray Chest 1 View   Final Result      Left PICC tip overlies the mid SVC.         Electronically signed by: Oj Solomon   Date:    02/28/2024   Time:    14:29         EF   Date Value Ref Range Status   05/09/2023 60 % Final   07/18/2022 40 % Final        NUTRITION STATUS  Patient meets ASPEN criteria for   malnutrition of   per RD assessment as evidenced by:                       A minimum of two characteristics is recommended for diagnosis of either severe or non-severe malnutrition.     Case related differential diagnoses have been reviewed; assessment and plan has been documented. I have personally reviewed the labs and test results that are currently available; I have reviewed the  patients medication list. I have reviewed the consulting providers recommendations. I have reviewed or attempted to review medical records based upon their availability.  All of the patient's and/or family's questions have been addressed and answered to the best of my ability.  I will continue to monitor closely and make adjustments to medical management as needed.  This document was created using M*Modal Fluency Direct.  Transcription errors may have been made.  Please contact me if any questions may rise regarding documentation to clarify transcription.      ANTHONY Vazquez   Internal Medicine  Department of Hospital Medicine [facility] - [facility] Surgical Unit

## 2024-03-27 NOTE — PROGRESS NOTES
Pharmacist Renal Dose Adjustment Note    Antonio Galvan II is a 58 y.o. male being treated with the medication Tobramycin.    Patient Data:    Vital Signs (Most Recent):  Temp: 98.2 °F (36.8 °C) (03/26/24 2136)  Pulse: 93 (03/27/24 0745)  Resp: 18 (03/27/24 0745)  BP: (!) 149/97 (03/27/24 0500)  SpO2: 99 % (03/27/24 0745) Vital Signs (72h Range):  Temp:  [97.7 °F (36.5 °C)-98.2 °F (36.8 °C)]   Pulse:  []   Resp:  [18-20]   BP: (113-149)/(75-98)   SpO2:  [97 %-100 %]      Recent Labs   Lab 03/24/24  0522 03/25/24  0759 03/26/24  0541   CREATININE 1.56* 1.45* 1.43*     Serum creatinine: 1.43 mg/dL (H) 03/26/24 0541  Estimated creatinine clearance: 63.1 mL/min (A)    Medication:Tobramycin dose: 300mg frequency 48 hrs will be changed to  dose:300mg  frequency: q72hrs    TR level on 3/26/24 was 2.1;  I extended the frequency to q72 hrs. CrCl remains low at 63 ml/min.  Will re-evaluate the next TR on 3/29/24 at 0700 before the 0900 dose.    Pharmacist's Name: Morgan Sanderson

## 2024-03-27 NOTE — PT/OT/SLP PROGRESS
Physical Therapy Treatment Note           Name: Antonio Galvan II    : 1965 (58 y.o.)  MRN: [id number]           TREATMENT SUMMARY AND RECOMMENDATIONS:    PT Received On: 24  PT Start Time: 1130     PT Stop Time: 1145  PT Total Time (min): 15 min     Subjective Assessment:  x No complaints  Lethargic    Awake, alert, cooperative  Uncooperative    Agitated  c/o pain    Appropriate  c/o fatigue    Confused  Treated at bedside     Emotionally labile  Treated in gym/dept.    Impulsive  Other:    Flat affect       Therapy Precautions:    Cognitive deficits  Spinal precautions    Collar - hard  Sternal precautions    Collar - soft   TLSO    Fall risk  LSO    Hip precautions - posterior  Knee immobilizer    Hip precautions - anterior  WBAT    Impaired communication  Partial weightbearing    Oxygen  TTWB    PEG tube  NWB    Visual deficits  Other:    Hearing deficits          Treatment Objectives:     Mobility Training:   Assist level Comments    Bed mobility     Transfer totalA Wilberto transfer bed-WC   Gait     Sit to stand transitions     Sitting balance     Standing balance      Wheelchair mobility     Car transfer     Other:          Therapeutic Exercise:   Exercise Sets Reps Comments                               Additional Comments:  No issues noted, catheter and wound vac in tow    Assessment: Patient tolerated session well.    PT Plan: continue  Revisions made to plan of care: No    GOALS:   Multidisciplinary Problems       Physical Therapy Goals          Problem: Physical Therapy    Goal Priority Disciplines Outcome Goal Variances Interventions   Physical Therapy Goal     PT, PT/OT Ongoing, Progressing     Description: Goals to be met by: Discharge     Pt to be seen for ROM tasks 1-2 x/day to ensure proper positioning can be obtained to overall reduce impacts of prolonged bed rest and skin break down    Progress pending healing stages of current wounds    Patient will increase functional  independence with mobility by performin. Pt to tolerate PROM tasks to B UE and LE, while obtaining 25% improvement in range.   2. Sitting EOB to tolerance pending wound healing - with pt demonstrating normal BP  3. Progress to OOB into chair once able. - Met  4. Pt to tolerate up to 3 hours in power chair - Met  5. Caregivers to be able to use tess with 1-2 people.                          Skilled PT Minutes Provided: 15   Communication with Treatment Team:     Equipment recommendations:       At end of treatment, patient remained:  x Up in chair     Up in wheelchair in room    In bed    With alarm activated    Bed rails up    Call bell in reach    x Family/friends present    Restraints secured properly    In bathroom with CNA/RN notified    Nurse aware    In gym with therapist/tech    Other:

## 2024-03-28 LAB
ANION GAP SERPL CALC-SCNC: 11 MEQ/L
BASOPHILS # BLD AUTO: 0.01 X10(3)/MCL
BASOPHILS NFR BLD AUTO: 0.3 %
BUN SERPL-MCNC: 35 MG/DL (ref 8.4–25.7)
CALCIUM SERPL-MCNC: 8.5 MG/DL (ref 8.4–10.2)
CHLORIDE SERPL-SCNC: 108 MMOL/L (ref 98–107)
CO2 SERPL-SCNC: 19 MMOL/L (ref 22–29)
CREAT SERPL-MCNC: 1.57 MG/DL (ref 0.73–1.18)
CREAT/UREA NIT SERPL: 22
EOSINOPHIL # BLD AUTO: 0.15 X10(3)/MCL (ref 0–0.9)
EOSINOPHIL NFR BLD AUTO: 4.3 %
ERYTHROCYTE [DISTWIDTH] IN BLOOD BY AUTOMATED COUNT: 17.6 % (ref 11.5–17)
GFR SERPLBLD CREATININE-BSD FMLA CKD-EPI: 51 MLS/MIN/1.73/M2
GLUCOSE SERPL-MCNC: 102 MG/DL (ref 70–110)
GLUCOSE SERPL-MCNC: 108 MG/DL (ref 74–100)
HCT VFR BLD AUTO: 24.2 % (ref 42–52)
HGB BLD-MCNC: 7.5 G/DL (ref 14–18)
IMM GRANULOCYTES # BLD AUTO: 0 X10(3)/MCL (ref 0–0.04)
IMM GRANULOCYTES NFR BLD AUTO: 0 %
LYMPHOCYTES # BLD AUTO: 1.68 X10(3)/MCL (ref 0.6–4.6)
LYMPHOCYTES NFR BLD AUTO: 48.4 %
MCH RBC QN AUTO: 27 PG (ref 27–31)
MCHC RBC AUTO-ENTMCNC: 31 G/DL (ref 33–36)
MCV RBC AUTO: 87.1 FL (ref 80–94)
MONOCYTES # BLD AUTO: 0.25 X10(3)/MCL (ref 0.1–1.3)
MONOCYTES NFR BLD AUTO: 7.2 %
NEUTROPHILS # BLD AUTO: 1.38 X10(3)/MCL (ref 2.1–9.2)
NEUTROPHILS NFR BLD AUTO: 39.8 %
PLATELET # BLD AUTO: 66 X10(3)/MCL (ref 130–400)
PMV BLD AUTO: 11.3 FL (ref 7.4–10.4)
POTASSIUM SERPL-SCNC: 5.1 MMOL/L (ref 3.5–5.1)
RBC # BLD AUTO: 2.78 X10(6)/MCL (ref 4.7–6.1)
SODIUM SERPL-SCNC: 138 MMOL/L (ref 136–145)
WBC # SPEC AUTO: 3.47 X10(3)/MCL (ref 4.5–11.5)

## 2024-03-28 PROCEDURE — 11000004 HC SNF PRIVATE

## 2024-03-28 PROCEDURE — 63600175 PHARM REV CODE 636 W HCPCS: Performed by: STUDENT IN AN ORGANIZED HEALTH CARE EDUCATION/TRAINING PROGRAM

## 2024-03-28 PROCEDURE — 63600175 PHARM REV CODE 636 W HCPCS: Performed by: PHYSICIAN ASSISTANT

## 2024-03-28 PROCEDURE — 25000003 PHARM REV CODE 250: Performed by: INTERNAL MEDICINE

## 2024-03-28 PROCEDURE — 25000003 PHARM REV CODE 250: Performed by: PHYSICIAN ASSISTANT

## 2024-03-28 PROCEDURE — 97606 NEG PRS WND THER DME>50 SQCM: CPT

## 2024-03-28 PROCEDURE — A4216 STERILE WATER/SALINE, 10 ML: HCPCS | Performed by: INTERNAL MEDICINE

## 2024-03-28 PROCEDURE — 27000207 HC ISOLATION

## 2024-03-28 PROCEDURE — 99900035 HC TECH TIME PER 15 MIN (STAT)

## 2024-03-28 PROCEDURE — 25000003 PHARM REV CODE 250: Performed by: STUDENT IN AN ORGANIZED HEALTH CARE EDUCATION/TRAINING PROGRAM

## 2024-03-28 PROCEDURE — 97530 THERAPEUTIC ACTIVITIES: CPT | Mod: CQ

## 2024-03-28 PROCEDURE — 80048 BASIC METABOLIC PNL TOTAL CA: CPT | Performed by: STUDENT IN AN ORGANIZED HEALTH CARE EDUCATION/TRAINING PROGRAM

## 2024-03-28 PROCEDURE — 94760 N-INVAS EAR/PLS OXIMETRY 1: CPT

## 2024-03-28 PROCEDURE — 85025 COMPLETE CBC W/AUTO DIFF WBC: CPT | Performed by: PHYSICIAN ASSISTANT

## 2024-03-28 RX ORDER — DILTIAZEM HYDROCHLORIDE 30 MG/1
30 TABLET, FILM COATED ORAL EVERY 8 HOURS
Status: DISCONTINUED | OUTPATIENT
Start: 2024-03-28 | End: 2024-03-28

## 2024-03-28 RX ORDER — CARVEDILOL 3.12 MG/1
3.12 TABLET ORAL 2 TIMES DAILY
Status: DISCONTINUED | OUTPATIENT
Start: 2024-03-28 | End: 2024-03-29

## 2024-03-28 RX ADMIN — OXYCODONE HYDROCHLORIDE AND ACETAMINOPHEN 500 MG: 500 TABLET ORAL at 09:03

## 2024-03-28 RX ADMIN — MICONAZOLE NITRATE: 20 CREAM TOPICAL at 09:03

## 2024-03-28 RX ADMIN — CETIRIZINE HYDROCHLORIDE 10 MG: 10 TABLET, FILM COATED ORAL at 09:03

## 2024-03-28 RX ADMIN — Medication 1 CAPSULE: at 05:03

## 2024-03-28 RX ADMIN — CARVEDILOL 3.12 MG: 3.12 TABLET, FILM COATED ORAL at 08:03

## 2024-03-28 RX ADMIN — Medication 1 CAPSULE: at 01:03

## 2024-03-28 RX ADMIN — LOPERAMIDE HYDROCHLORIDE 4 MG: 2 CAPSULE ORAL at 09:03

## 2024-03-28 RX ADMIN — INSULIN DETEMIR 15 UNITS: 100 INJECTION, SOLUTION SUBCUTANEOUS at 09:03

## 2024-03-28 RX ADMIN — DILTIAZEM HYDROCHLORIDE 30 MG: 30 TABLET, FILM COATED ORAL at 05:03

## 2024-03-28 RX ADMIN — SODIUM CHLORIDE, PRESERVATIVE FREE 10 ML: 5 INJECTION INTRAVENOUS at 12:03

## 2024-03-28 RX ADMIN — ONDANSETRON 4 MG: 2 INJECTION INTRAMUSCULAR; INTRAVENOUS at 02:03

## 2024-03-28 RX ADMIN — ONDANSETRON 4 MG: 2 INJECTION INTRAMUSCULAR; INTRAVENOUS at 04:03

## 2024-03-28 RX ADMIN — Medication 1 CAPSULE: at 09:03

## 2024-03-28 RX ADMIN — HYDROCODONE BITARTRATE AND ACETAMINOPHEN 1 TABLET: 7.5; 325 TABLET ORAL at 04:03

## 2024-03-28 RX ADMIN — ACETAMINOPHEN 650 MG: 325 TABLET ORAL at 08:03

## 2024-03-28 RX ADMIN — SODIUM CHLORIDE, PRESERVATIVE FREE 10 ML: 5 INJECTION INTRAVENOUS at 05:03

## 2024-03-28 RX ADMIN — PANTOPRAZOLE SODIUM 40 MG: 40 TABLET, DELAYED RELEASE ORAL at 09:03

## 2024-03-28 RX ADMIN — HYDROCODONE BITARTRATE AND ACETAMINOPHEN 1 TABLET: 7.5; 325 TABLET ORAL at 08:03

## 2024-03-28 RX ADMIN — SODIUM CHLORIDE, PRESERVATIVE FREE 10 ML: 5 INJECTION INTRAVENOUS at 01:03

## 2024-03-28 RX ADMIN — SITAGLIPTIN 100 MG: 50 TABLET, FILM COATED ORAL at 09:03

## 2024-03-28 RX ADMIN — LINEZOLID 600 MG: 600 TABLET, FILM COATED ORAL at 09:03

## 2024-03-28 RX ADMIN — COLLAGENASE SANTYL: 250 OINTMENT TOPICAL at 09:03

## 2024-03-28 RX ADMIN — FERROUS SULFATE TAB 325 MG (65 MG ELEMENTAL FE) 1 EACH: 325 (65 FE) TAB at 09:03

## 2024-03-28 NOTE — PROGRESS NOTES
Inpatient Nutrition Evaluation    Admit Date: 2/26/2024   Total duration of encounter: 31 days   Patient Age: 58 y.o.    Nutrition Recommendation/Prescription     Continue regular diet. Adjust diet, per patient request. Requesting sugar free.  2. Continue Arginaid 1 pk BID for wound healing 3. Continue ascorbic acid 500 mg PO daily 4. Weekly weights 5. Monitor intake, wt, labs, medications.       Nutrition Assessment     Chart Review    Reason Seen: continuous nutrition monitoring, length of stay, and follow-up    Malnutrition Screening Tool Results   Have you recently lost weight without trying?: Yes: 34 lbs or more  Have you been eating poorly because of a decreased appetite?: Yes   MST Score: 5   Diagnosis:  Infected wounds/sacrum, abd, feet  Klebsiella pneumoniae, Pseudomonas aeruginosa, Proteus mirabilis  DM,   Hypotension  Hyperglycemia  VRE  A-fib       Relevant Medical History:   Cardiac arrest         7/2022    Chronic skin ulcer      DM (diabetes mellitus)      Infected decubitus ulcer      Lymphedema of leg      Neurogenic bladder      Obesity, unspecified      Pressure ulcer of heel      Pressure ulcer of right foot, stage 3      Pressure ulcer of right ischium      Quadriplegia      Quadriplegic spinal paralysis        Scheduled Medications:  acetaminophen, 650 mg, QHS  ascorbic acid (vitamin C), 500 mg, Daily  carvediloL, 3.125 mg, BID  cetirizine, 10 mg, Daily  collagenase, , Daily  ferrous sulfate, 1 tablet, Daily  fluconazole, 200 mg, Weekly  insulin detemir U-100, 15 Units, QHS  Lactobacillus acidophilus, 1 capsule, TID WM  loperamide, 4 mg, Daily  miconazole, , BID  pantoprazole, 40 mg, Daily  SITagliptin phosphate, 100 mg, Daily  sodium chloride 0.9%, 10 mL, Q6H    Continuous Infusions:   PRN Medications: 0.9%  NaCl infusion (for blood administration), 0.9%  NaCl infusion (for blood administration), sodium chloride 0.9%, acetaminophen, albuterol, benzonatate, bisacodyL, budesonide, calcium  carbonate, dextrose 10%, dextrose 10%, dextrose 10%, dextrose 10%, diphenhydrAMINE, glucagon (human recombinant), glucagon (human recombinant), glucose, glucose, glucose, glucose, hydrALAZINE, HYDROcodone-acetaminophen, HYDROcodone-acetaminophen, hydrOXYzine pamoate, insulin aspart U-100, melatonin, metoprolol, ondansetron, simethicone, Flushing PICC/Midline Protocol **AND** sodium chloride 0.9% **AND** sodium chloride 0.9%, zolpidem    Recent Labs   Lab 03/22/24  0542 03/23/24  0522 03/24/24  0522 03/25/24  0759 03/26/24  0541 03/27/24  0840 03/27/24  1422 03/28/24  0523    142 139 137 138 138  --  138   K 4.9 5.1 5.2* 5.2* 5.1 5.6* 5.2* 5.1   CALCIUM 8.4 8.4 8.4 8.8 8.7 8.5  --  8.5   CHLORIDE 109* 111* 110* 110* 111* 110*  --  108*   CO2 20* 20* 20* 19* 19* 19*  --  19*   BUN 50.1* 45.0* 42.9* 43.7* 39.6* 37.3*  --  35.0*   CREATININE 1.56* 1.43* 1.56* 1.45* 1.43* 1.35*  --  1.57*   EGFRNORACEVR 52 58 52 57 58 >60  --  51   GLUCOSE 169* 133* 136* 130* 115* 118*  --  108*   BILITOT  --   --  0.2  --   --   --   --   --    ALKPHOS  --   --  61  --   --   --   --   --    ALT  --   --  10  --   --   --   --   --    AST  --   --  6  --   --   --   --   --    ALBUMIN  --   --  2.3*  --   --   --   --   --    WBC  --   --   --   --  2.97*  --   --  3.47*   HGB  --   --   --   --  8.1*  --   --  7.5*   HCT  --   --   --   --  26.9*  --   --  24.2*     Nutrition Orders:  Diet Adult Regular  Dietary nutrition supplements Arginaid - Any flavor; BID    Appetite/Oral Intake: good/% of meals  Factors Affecting Nutritional Intake:  multiple wounds  Food/Hindu/Cultural Preferences: none reported  Food Allergies: none reported  Last Bowel Movement: 03/27/24  Wound(s):     Altered Skin Integrity 05/06/22 1140 Right Heel  Full thickness tissue loss. Subcutaneous fat may be visible but bone, tendon or muscle are not exposed-Tissue loss description: Full thickness       Altered Skin Integrity 01/12/23 1530 Sacral  "spine Full thickness tissue loss with exposed bone, tendon, or muscle. Often includes undermining and tunneling. May extend into muscle and/or supporting structures.-Tissue loss description: Full thickness       Altered Skin Integrity 08/01/23 1534 Left posterior Ankle Full thickness tissue loss. Base is covered by slough and/or eschar in the wound bed-Tissue loss description: Not applicable       Altered Skin Integrity 08/01/23 1535 Left anterior Ankle Other (comment) Full thickness tissue loss. Base is covered by slough and/or eschar in the wound bed-Tissue loss description: Not applicable     Comments    Pt intake 100%. Pt eating outside food as well from family, nursing reports. Will monitor.     Anthropometrics    Height: 5' 7" (170.2 cm), Height Method: Stated  Last Weight:  (he sleep and the bed dont give thw weight on it) (03/25/24 0605), Weight Method: Bed Scale  BMI (Calculated): 32.6  BMI Classification: obese grade I (BMI 30-34.9)        Ideal Body Weight (IBW), Male: 148 lb     % Ideal Body Weight, Male (lb): 139.86 %                          Usual Weight Provided By: unable to obtain usual weight    Wt Readings from Last 5 Encounters:   03/18/24 94.4 kg (208 lb 1.8 oz)   02/27/24 93.9 kg (207 lb 0.2 oz)   02/23/24 93.9 kg (207 lb)   08/16/23 106.4 kg (234 lb 9.6 oz)   08/15/23 113.4 kg (250 lb)     Weight Change(s) Since Admission:   Wt Readings from Last 1 Encounters:   03/18/24 0500 94.4 kg (208 lb 1.8 oz)   03/04/24 0500 95 kg (209 lb 7 oz)   02/26/24 1500 93.9 kg (207 lb)   Admit Weight: 93.9 kg (207 lb) (02/26/24 1500), Weight Method: Bed Scale (Used bed weight from admission 2/23/24 as his bed does not have a scale)    Patient Education     Not applicable.      Nutrition Goals & Monitoring     Dietitian will monitor: food and beverage intake, electrolyte/renal panel, glucose/endocrine profile, and gastrointestinal profile    Nutrition Risk/Follow-Up: low (follow-up in 5-7 days)  Patients assigned " 'low nutrition risk' status do not qualify for a full nutritional assessment but will be monitored and re-evaluated in a 5-7 day time period. Please consult if re-evaluation needed sooner.

## 2024-03-28 NOTE — PLAN OF CARE
Problem: Adult Inpatient Plan of Care  Goal: Plan of Care Review  Outcome: Ongoing, Progressing  Flowsheets (Taken 3/27/2024 2320)  Plan of Care Reviewed With: patient  Goal: Patient-Specific Goal (Individualized)  Outcome: Ongoing, Progressing  Flowsheets (Taken 3/27/2024 2320)  Anxieties, Fears or Concerns: none  Individualized Care Needs: turn q2, monitor b\p, monitor wound vac, monitor CBG's  Goal: Absence of Hospital-Acquired Illness or Injury  Outcome: Ongoing, Progressing  Intervention: Identify and Manage Fall Risk  Flowsheets (Taken 3/27/2024 2320)  Safety Promotion/Fall Prevention:   assistive device/personal item within reach   family to remain at bedside   room near unit station   side rails raised x 3  Intervention: Prevent Skin Injury  Flowsheets (Taken 3/27/2024 2320)  Body Position:   side-lying   right  Skin Protection: adhesive use limited  Intervention: Prevent and Manage VTE (Venous Thromboembolism) Risk  Flowsheets (Taken 3/27/2024 2320)  Activity Management: Rolling - L1  VTE Prevention/Management: fluids promoted  Range of Motion: ROM (range of motion) performed  Intervention: Prevent Infection  Flowsheets (Taken 3/27/2024 2320)  Infection Prevention:   cohorting utilized   personal protective equipment utilized   rest/sleep promoted  Goal: Optimal Comfort and Wellbeing  Outcome: Ongoing, Progressing  Intervention: Monitor Pain and Promote Comfort  Flowsheets (Taken 3/27/2024 2320)  Pain Management Interventions:   care clustered   medication offered  Intervention: Provide Person-Centered Care  Flowsheets (Taken 3/27/2024 2320)  Trust Relationship/Rapport:   care explained   choices provided   emotional support provided   empathic listening provided   questions answered   questions encouraged   reassurance provided   thoughts/feelings acknowledged  Goal: Readiness for Transition of Care  Outcome: Ongoing, Progressing  Intervention: Mutually Develop Transition Plan  Flowsheets (Taken  3/27/2024 2320)  Equipment Currently Used at Home:   hospital bed   lift device   power chair  Transportation Anticipated: family or friend will provide  Who are your caregiver(s) and their phone number(s)?: [phone] Judy(mother )  Communicated SADE with patient/caregiver: Date not available/Unable to determine  Do you expect to return to your current living situation?: Yes  Do you have help at home or someone to help you manage your care at home?: Yes  Readmission within 30 days?: No  Do you currently have service(s) that help you manage your care at home?: No  Is the pt/caregiver preference to resume services with current agency: No     Problem: Diabetes Comorbidity  Goal: Blood Glucose Level Within Targeted Range  Outcome: Ongoing, Progressing  Intervention: Monitor and Manage Glycemia  Flowsheets (Taken 3/27/2024 2320)  Glycemic Management:   blood glucose monitored   supplemental insulin given     Problem: Skin Injury Risk Increased  Goal: Skin Health and Integrity  Outcome: Ongoing, Progressing  Intervention: Optimize Skin Protection  Flowsheets (Taken 3/27/2024 2320)  Pressure Reduction Techniques: frequent weight shift encouraged  Pressure Reduction Devices: positioning supports utilized  Skin Protection: adhesive use limited  Head of Bed (HOB) Positioning: HOB at 20-30 degrees  Intervention: Promote and Optimize Oral Intake  Flowsheets (Taken 3/27/2024 2320)  Oral Nutrition Promotion: calorie-dense foods provided     Problem: Impaired Wound Healing  Goal: Optimal Wound Healing  Outcome: Ongoing, Progressing  Intervention: Promote Wound Healing  Flowsheets (Taken 3/27/2024 2320)  Oral Nutrition Promotion: calorie-dense foods provided  Sleep/Rest Enhancement:   awakenings minimized   natural light exposure provided   relaxation techniques promoted  Activity Management: Rolling - L1  Pain Management Interventions:   care clustered   medication offered     Problem: Fall Injury Risk  Goal: Absence of Fall and  Fall-Related Injury  Outcome: Ongoing, Progressing  Intervention: Identify and Manage Contributors  Flowsheets (Taken 3/27/2024 2320)  Self-Care Promotion: safe use of adaptive equipment encouraged  Medication Review/Management: medications reviewed  Intervention: Promote Injury-Free Environment  Flowsheets (Taken 3/27/2024 2320)  Safety Promotion/Fall Prevention:   assistive device/personal item within reach   family to remain at bedside   room near unit station   side rails raised x 3     Problem: Infection  Goal: Absence of Infection Signs and Symptoms  Outcome: Ongoing, Progressing  Intervention: Prevent or Manage Infection  Flowsheets (Taken 3/27/2024 2320)  Fever Reduction/Comfort Measures:   lightweight bedding   lightweight clothing  Infection Management: aseptic technique maintained  Isolation Precautions: precautions maintained

## 2024-03-28 NOTE — PLAN OF CARE
Weekly Staffing Report      Date Admitted: 2/26/2024 :   Staffing Date: 3/28/2024     Patient Active Problem List   Diagnosis    Uncontrolled type 2 diabetes mellitus with hyperglycemia    Atrial flutter    Constipation    Abnormal urinalysis    Obstructive sleep apnea syndrome    Acute deep vein thrombosis (DVT) of brachial vein of right upper extremity    Quadriplegia    Intolerance of continuous positive airway pressure (CPAP) ventilation    Complex sleep apnea syndrome    Open wound of left hip    Pressure injury of right ischium, stage 4    Osteomyelitis    Sacral decubitus ulcer, stage II    Chronic blood loss anemia    Infected pressure ulcer, unspecified pressure ulcer stage          Team Members Present:       Nursing Present     Yes [x]    No []    Physical Therapy Present    Yes [x]    No []    Occupational Therapy Present     Yes []    No [x]    Speech Therapy Present    Yes []    No []    NA [x]    Dietary Present    Yes [x]    No []        Physician Present     [] Thairy Reyes    [x] Jeanette Mann    [x] ANTHONY Howe    []       Family Present    [] Adult Children    [] Spouse    [] POA    [] Friend/ Caregiver    [] Other       Interdisciplinary Meeting Notes:  Mother, Judy, on phone. PT does ROM exercises. Getting up in chair. Appetite-good. Medically- doing well. Blood sugars are doing better. Not many lows now. Kidney functions better after fluids. HR better. BP better. Medically stable. Abx until 04/12. All questions answered at this time. Plan to discharge home with home health when ready                 Please see Documented PT/OT/ST, Dietary, Nursing, and Physician notes for detailed treatment information.

## 2024-03-28 NOTE — PROGRESS NOTES
"Pharmacokinetic Follow Up: Tobramycin    Assessment of levels:   Random concentration of 2.1 mcg/mL (drawn on 3/26/24; 47 hours post-infusion) corresponds to a dosing interval of every 48 hours per the [place] Nom[facility]    Regimen Plan:   Change frequency to q 72hrs due to TR being 2.1mcg/ml.    Will check trough concentration 120 min prior to the dose on 3/29/24 at 0700.   Dose due at 0900 on 3/29/24.    The blood levels are done at [facility]; which is the reason for the 2 hours prior to dose.  The frequency of q 72hrs should bring the Tobramycin level down close to 1.    Drug levels (last 3 results):  No results for input(s): "AMIKACINPEAK", "AMIKACINTROU", "AMIKACINRAND", "AMIKACIN" in the last 72 hours.    No results for input(s): "GENTAMICIN", "GENTPEAK", "GENTTROUGH", "GENT10", "GENT12", "GENT8", "GENTRANDOM" in the last 72 hours.    Recent Labs   Lab Result Units 03/26/24  1330   Tobramycin Trough ug/ml 2.1*       Aminoglycoside Administrations:  aminoglycosides given in last 96 hours                     tobramycin (NEBCIN) 300 mg in dextrose 5 % (D5W) 100 mL IVPB (mg) 300 mg New Bag 03/26/24 2140     300 mg New Bag 03/24/24 1521                    Pharmacy will continue to monitor.    Thank you for the consult,   Morgan Sanderson    Patient brief summary:  Antonio Galvan II is a 58 y.o. male initiated on aminoglycoside therapy for treatment of bone/joint infection    Drug Allergies:   Review of patient's allergies indicates:   Allergen Reactions    Metoprolol Other (See Comments)     Pt refuses, states his arrest occurred after receiving this medication       Renal Function:   Estimated Creatinine Clearance: 57.5 mL/min (A) (based on SCr of 1.57 mg/dL (H)).,     CBC (last 72 hours):  Recent Labs   Lab Result Units 03/26/24  0541 03/28/24  0523   WBC x10(3)/mcL 2.97* 3.47*   Hgb g/dL 8.1* 7.5*   Hct % 26.9* 24.2*   Platelet x10(3)/mcL 74* 66*   Mono % % 6.1 7.2   Eos % % 6.1 4.3   Basophil % % 0.7 0.3 "       Metabolic Panel (last 72 hours):  Recent Labs   Lab Result Units 03/26/24  0541 03/27/24  0840 03/27/24  1422 03/28/24  0523   Sodium Level mmol/L 138 138  --  138   Potassium Level mmol/L 5.1 5.6* 5.2* 5.1   Chloride mmol/L 111* 110*  --  108*   Carbon Dioxide mmol/L 19* 19*  --  19*   Glucose Level mg/dL 115* 118*  --  108*   Blood Urea Nitrogen mg/dL 39.6* 37.3*  --  35.0*   Creatinine mg/dL 1.43* 1.35*  --  1.57*       Microbiologic Results:  Microbiology Results (last 7 days)       ** No results found for the last 168 hours. **

## 2024-03-28 NOTE — PROGRESS NOTES
Wound vac dressing change performed to sacral and R buttock ulceration.   No lexi bleeding noted today during dressing changes.   Reapplied dressings to both, bridging sites with trac pad to R hip to decrease additional pressure from tubing.  Pt premedicated for wound care.  Tolerated well.   Seal intact continuous -125mmHg with twice weekly dressing changes to continue at this time.     03/28/24 1733   WOCN Assessment   WOCN Total Time (mins) 75   Visit Date 03/28/24   Procedure wound vac        Altered Skin Integrity 01/12/23 1530 Sacral spine Full thickness tissue loss with exposed bone, tendon, or muscle. Often includes undermining and tunneling. May extend into muscle and/or supporting structures.   Date First Assessed/Time First Assessed: 01/12/23 1530   Altered Skin Integrity Present on Admission - Did Patient arrive to the hospital with altered skin?: yes  Location: Sacral spine  Description of Altered Skin Integrity: Full thickness tissue los...   Wound Image    Description of Altered Skin Integrity Full thickness tissue loss with exposed bone, tendon, or muscle. Often includes undermining and tunneling. May extend into muscle and/or supporting structures.   Dressing Appearance Intact;Moist drainage   Drainage Amount Moderate   Drainage Characteristics/Odor Serosanguineous;No odor;Bleeding controlled   Appearance Red;Granulating;Tan;White;Fibrin;Slough;Other (see comments)  (connective tissue)   Tissue loss description Full thickness   Red (%), Wound Tissue Color 60 %   Yellow (%), Wound Tissue Color 40 %   Periwound Area Intact   Wound Edges Defined   Wound Length (cm) 8.5 cm   Wound Width (cm) 7 cm   Wound Depth (cm) 2.5 cm   Wound Volume (cm^3) 148.75 cm^3   Wound Surface Area (cm^2) 59.5 cm^2   Care Cleansed with:;Antimicrobial agent;Wound cleanser   Dressing Changed;Other (comment)  (black granufoam/drape)   Packing packed with;other (see comment)  (black granufoam/drape)   Periwound Care Hydrocolloid  barrier applied;Skin barrier film applied;Other (see comments)  (mastisol for additional adhesive)   Dressing Change Due 04/01/24        Incision/Site 02/27/24 1450 Right Buttocks lateral   Date First Assessed/Time First Assessed: 02/27/24 1450   Side: Right  Location: Buttocks  Orientation: lateral  Additional Comments: mesalt, quick clot, gauze, abdomen pad, and tape   Wound Image    Dressing Appearance Intact   Drainage Amount Moderate   Drainage Characteristics/Odor Serosanguineous;No odor;Bleeding controlled   Appearance Red;Granulating   Red (%), Wound Tissue Color 100 %   Periwound Area Scar tissue;Moist   Wound Edges Defined   Wound Length (cm) 7.3 cm   Wound Width (cm) 12.3 cm   Wound Depth (cm) 1.5 cm   Wound Volume (cm^3) 134.685 cm^3   Wound Surface Area (cm^2) 89.79 cm^2   Undermining (depth (cm)/location) from 12 to 2 o'clock / 3.8cm at 1 o'clock   Care Cleansed with:;Antimicrobial agent;Wound cleanser   Dressing Changed  (black granufoam/ drape)   Periwound Care Skin barrier film applied;Hydrocolloid barrier applied;Other (see comments)  (mastisol for additional adhesive)   Dressing Change Due 04/01/24        Negative Pressure Wound Therapy  02/29/24 1227   Placement Date/Time: 02/29/24 1227   Location: Sacral Spine   NPWT Type Vacuum Therapy   Therapy Setting NPWT Continuous therapy   Pressure Setting NPWT 125 mmHg   Therapy Interventions NPWT Dressing changed;Seal intact   Sponges Inserted NPWT Black;4   Sponges Removed NPWT Black;4

## 2024-03-28 NOTE — PT/OT/SLP PROGRESS
Physical Therapy Treatment Note           Name: Antonio Galvan II    : 1965 (58 y.o.)  MRN: [id number]           TREATMENT SUMMARY AND RECOMMENDATIONS:    PT Received On: 24  PT Start Time: 1100     PT Stop Time: 1120  PT Total Time (min): 20 min     Subjective Assessment:  x No complaints  Lethargic    Awake, alert, cooperative  Uncooperative    Agitated  c/o pain    Appropriate  c/o fatigue    Confused  Treated at bedside     Emotionally labile  Treated in gym/dept.    Impulsive  Other:    Flat affect       Therapy Precautions:    Cognitive deficits  Spinal precautions    Collar - hard  Sternal precautions    Collar - soft   TLSO    Fall risk  LSO    Hip precautions - posterior  Knee immobilizer    Hip precautions - anterior  WBAT    Impaired communication  Partial weightbearing    Oxygen  TTWB    PEG tube  NWB    Visual deficits  Other:    Hearing deficits          Treatment Objectives:     Mobility Training:   Assist level Comments    Bed mobility totalA Roll L and R    Transfer totalA Bed-WC with tess lift    Gait     Sit to stand transitions     Sitting balance     Standing balance      Wheelchair mobility     Car transfer     Other:          Therapeutic Exercise:   Exercise Sets Reps Comments                               Additional Comments:  No issues noted- wound vac and catheter in tow    Assessment: Patient tolerated session well.    PT Plan: continue  Revisions made to plan of care: No    GOALS:   Multidisciplinary Problems       Physical Therapy Goals          Problem: Physical Therapy    Goal Priority Disciplines Outcome Goal Variances Interventions   Physical Therapy Goal     PT, PT/OT Ongoing, Progressing     Description: Goals to be met by: Discharge     Pt to be seen for ROM tasks 1-2 x/day to ensure proper positioning can be obtained to overall reduce impacts of prolonged bed rest and skin break down    Progress pending healing stages of current wounds    Patient will  increase functional independence with mobility by performin. Pt to tolerate PROM tasks to B UE and LE, while obtaining 25% improvement in range.   2. Sitting EOB to tolerance pending wound healing - with pt demonstrating normal BP  3. Progress to OOB into chair once able. - Met  4. Pt to tolerate up to 3 hours in power chair - Met  5. Caregivers to be able to use tess with 1-2 people.                          Skilled PT Minutes Provided: 20   Communication with Treatment Team:     Equipment recommendations:       At end of treatment, patient remained:  x Up in chair     Up in wheelchair in room    In bed    With alarm activated    Bed rails up    Call bell in reach    x Family/friends present    Restraints secured properly    In bathroom with CNA/RN notified    Nurse aware    In gym with therapist/tech    Other:

## 2024-03-28 NOTE — PROGRESS NOTES
[facility] - [facility] Surgical [facility]  [facility] MEDICINE ~ PROGRESS NOTE  CHIEF COMPLAINT     Hospital follow up for nonhealing wound    HOSPITAL COURSE     58-year-old man with quadriplegic spinal paralysis and numerous decubitus ulcers who is followed by wound care is brought in today due to fever. He had wound cultures done 3 days ago that have grown Proteus mirabilis and Klebsiella pneumoniae. Sensitivities are not complete. The wounds were noted to have significant odor and heavy green drainage. In addition the patient had some nausea and vomiting and home health noted that his blood pressure was dipping. He was advised to come to the emergency room. Wound cultures were drawn from the right hip wound. His wife reports that when the wound is pressed above pus comes out. Both Klebsiella and Proteus maybe treated with fluoroquinolones which are both IV and oral. The patient's wife preferred if the patient came home so that she could attend to his wounds however the patient expressed a desire to be admitted.     Today:  Potassium remained high yesterday afternoon therefore 1 dose of Lokelma was given.  Potassium only minimal improved today, but within normal limits.  Hypotensive episode yesterday afternoon therefore we will discontinue diltiazem and initiate Coreg 3.125 mg b.i.d. Patient states he has been on this in the past and a control his heart rate well.  H&H and platelets continue to decrease. Suspect Zyvox induced myelosuppression. Antibiotics were initiated on 03/02/2024 therefore he has received 26 days of antibiotic therapy. Patient is noted to have chronic osteomyelitis therefore he would only require 2 weeks of antibiotic therapy for skin and soft tissue infection. We will discontinue antibiotics today due to risk vs benefits of continuing antibiotic therapy in the setting of myelosuppression and continue with aggressive wound care.     OBJECTIVE/PHYSICAL EXAM     VITAL SIGNS (MOST RECENT):  Temp:  99.1 °F (37.3 °C) (03/28/24 0840)  Pulse: 86 (03/28/24 0840)  Resp: 18 (03/27/24 2014)  BP: 113/79 (03/28/24 0840)  SpO2: 100 % (03/28/24 0840) VITAL SIGNS (24 HOUR RANGE):  Temp:  [97.7 °F (36.5 °C)-99.1 °F (37.3 °C)] 99.1 °F (37.3 °C)  Pulse:  [] 86  Resp:  [18] 18  SpO2:  [100 %] 100 %  BP: ()/(59-93) 113/79     GENERAL: In no acute distress, afebrile  HEENT:  PERRLA  CHEST: Clear to auscultation bilaterally  HEART: S1, S2, no appreciable murmur  ABDOMEN: Soft, nontender, BS +  MSK: Warm, no lower extremity edema, no clubbing or cyanosis  NEUROLOGIC: Alert and oriented x4  INTEGUMENTARY:  See media for wounds    ASSESSMENT/PLAN     Nonhealing chronic wound   VRE, Proteus, Pseudomonas, Klebsiella   Chronic osteomyelitis   S/p debridement with Dr. Jean on 02/27/2024  Zyvox and Tobramycin 03/02/2024-03/28/2024  History of chronic osteomyelitis - only needs 2 weeks of antibiotic therapy for acute skin and soft tissue infection  Discontinue antibiotics today due to risk vs benefits of continuing antibiotic therapy in the setting of myelosuppression and continue with aggressive wound care  Wound VAC and wound care  Appreciate dietary recommendations for supplements     Atrial flutter  Hypertension/Hypotension   Diltiazem started 3/16/2024 and has required multiple adjustments due to hypotension and tachycardia   Discontinue Diltiazem today   Start Coreg 3.125mg BID   Continue cardiac monitoring   No AC 2/2 patient request as he has has prior episodes of acute blood loss from wounds - aspirin d/c 02/28/2024  Hypertensive episodes are associated with pain    Post op anemia   Thrombocytopenia   Myelosuppression 2/2 Zyvox   H&H improved s/p 2u pRBC 02/28/2024 and 1u pRBC on 02/29/2024  No anticoagulation at this time - aspirin d/c 02/28/2024  Peripheral smear without significant findings   Discontinue antibiotic   Monitor labs closely      Hyperglycemia/hypoglycemia in setting of DM  A1c 02/23/2024  8.5%  Reports taking 80u Levemir BID  Continue home Sitagliptin  Continue titrating insulin regimen as needed    GISSEL - improving   Hyperkalemia  Renal function improving  Patient has refused Kayexalate multiple times   Received Lokelma x1 yesterday   24 hour urine potassium ordered     Left upper extremity swelling  Ultrasound without DVT     Hx of: Neurogenic bladder, Quadriplegia  Will need to change suprapubic catheter soon per patient     DVT prophylaxis:  SCDs, history of bleeding in the recent past    Anticipated discharge and disposition: home with [facility] when antibiotics are complete on 04/12/2024  __________________________________________________________________________    LABS/MICRO/MEDS/DIAGNOSTICS     LABS  Recent Labs     03/28/24  0523      K 5.1   CHLORIDE 108*   CO2 19*   BUN 35.0*   CREATININE 1.57*   GLUCOSE 108*   CALCIUM 8.5     Recent Labs     03/28/24  0523   WBC 3.47*   RBC 2.78*   HCT 24.2*   MCV 87.1   PLT 66*     MICROBIOLOGY  Microbiology Results (last 7 days)       ** No results found for the last 168 hours. **             MEDICATIONS   acetaminophen  650 mg Oral QHS    ascorbic acid (vitamin C)  500 mg Oral Daily    carvediloL  3.125 mg Oral BID    cetirizine  10 mg Oral Daily    collagenase   Topical (Top) Daily    ferrous sulfate  1 tablet Oral Daily    fluconazole  200 mg Oral Weekly    insulin detemir U-100  15 Units Subcutaneous QHS    Lactobacillus acidophilus  1 capsule Oral TID WM    loperamide  4 mg Oral Daily    miconazole   Topical (Top) BID    pantoprazole  40 mg Oral Daily    SITagliptin phosphate  100 mg Oral Daily    sodium chloride 0.9%  10 mL Intravenous Q6H         INFUSIONS       DIAGNOSTIC TESTS  US Upper Extremity Veins Left   Final Result      No venous thrombosis identified.         Electronically signed by: Andrade Perez   Date:    03/18/2024   Time:    21:08      X-Ray Chest 1 View   Final Result      Left PICC tip overlies the mid SVC.         Electronically  signed by: Oj Solomon   Date:    02/28/2024   Time:    14:29         EF   Date Value Ref Range Status   05/09/2023 60 % Final   07/18/2022 40 % Final        NUTRITION STATUS  Patient meets ASPEN criteria for   malnutrition of   per RD assessment as evidenced by:                       A minimum of two characteristics is recommended for diagnosis of either severe or non-severe malnutrition.     Case related differential diagnoses have been reviewed; assessment and plan has been documented. I have personally reviewed the labs and test results that are currently available; I have reviewed the patients medication list. I have reviewed the consulting providers recommendations. I have reviewed or attempted to review medical records based upon their availability.  All of the patient's and/or family's questions have been addressed and answered to the best of my ability.  I will continue to monitor closely and make adjustments to medical management as needed.  This document was created using M*Modal Fluency Direct.  Transcription errors may have been made.  Please contact me if any questions may rise regarding documentation to clarify transcription.      ANTHONY Vazquez   Internal Medicine  Department of Hospital Medicine [facility] - [facility] Surgical [facility]

## 2024-03-28 NOTE — PLAN OF CARE
Problem: Adult Inpatient Plan of Care  Goal: Plan of Care Review  Outcome: Ongoing, Progressing  Goal: Patient-Specific Goal (Individualized)  Outcome: Ongoing, Progressing  Flowsheets (Taken 3/28/2024 1420)  Anxieties, Fears or Concerns: none expressed  Individualized Care Needs: monitor Blood pressure, wound vac, and CBGs. Turn Q2  Goal: Absence of Hospital-Acquired Illness or Injury  Outcome: Ongoing, Progressing  Intervention: Identify and Manage Fall Risk  Flowsheets (Taken 3/28/2024 1420)  Safety Promotion/Fall Prevention:   assistive device/personal item within reach   bed alarm set   nonskid shoes/socks when out of bed   side rails raised x 2  Goal: Optimal Comfort and Wellbeing  Outcome: Ongoing, Progressing  Goal: Readiness for Transition of Care  Outcome: Ongoing, Progressing     Problem: Diabetes Comorbidity  Goal: Blood Glucose Level Within Targeted Range  Outcome: Ongoing, Progressing  Intervention: Monitor and Manage Glycemia  Flowsheets (Taken 3/28/2024 1420)  Glycemic Management: blood glucose monitored     Problem: Skin Injury Risk Increased  Goal: Skin Health and Integrity  Outcome: Ongoing, Progressing  Intervention: Optimize Skin Protection  Flowsheets (Taken 3/28/2024 1420)  Pressure Reduction Techniques: frequent weight shift encouraged  Pressure Reduction Devices: specialty bed utilized  Skin Protection:   adhesive use limited   incontinence pads utilized   tubing/devices free from skin contact  Head of Bed (HOB) Positioning: HOB at 30 degrees     Problem: Impaired Wound Healing  Goal: Optimal Wound Healing  Outcome: Ongoing, Progressing  Intervention: Promote Wound Healing  Flowsheets (Taken 3/28/2024 1420)  Oral Nutrition Promotion:   calorie-dense foods provided   calorie-dense liquids provided   safe use of adaptive equipment encouraged   physical activity promoted  Pain Management Interventions:   quiet environment facilitated   pain management plan reviewed with patient/caregiver      Problem: Fall Injury Risk  Goal: Absence of Fall and Fall-Related Injury  Outcome: Ongoing, Progressing  Intervention: Identify and Manage Contributors  Flowsheets (Taken 3/28/2024 1420)  Self-Care Promotion:   independence encouraged   safe use of adaptive equipment encouraged  Medication Review/Management: medications reviewed     Problem: Infection  Goal: Absence of Infection Signs and Symptoms  Outcome: Ongoing, Progressing  Intervention: Prevent or Manage Infection  Flowsheets (Taken 3/28/2024 1420)  Infection Management: aseptic technique maintained  Isolation Precautions: protective

## 2024-03-28 NOTE — NURSING
Nurses Note -- 4 Eyes      3/27/2024   11:23 PM      Skin assessed during: Daily Assessment      [x] No Altered Skin Integrity Present    []Prevention Measures Documented      [] Yes- Altered Skin Integrity Present or Discovered   [] LDA Added if Not in Epic (Describe Wound)   [] New Altered Skin Integrity was Present on Admit and Documented in LDA   [] Wound Image Taken    Wound Care Consulted? No    Attending Nurse:  Loraine Montano RN/Staff Member:  Shivam MADDOX

## 2024-03-28 NOTE — PLAN OF CARE
[facility] - [facility]  Discharge Reassessment    Primary Care Provider: Jennifer Fernando NP    Expected Discharge Date: 4/12/2024    Reassessment (most recent)       Discharge Reassessment - 03/28/24 1411          Discharge Reassessment    Assessment Type Discharge Planning Reassessment     Did the patient's condition or plan change since previous assessment? No     Discharge Plan discussed with: Patient;Parent(s)     Name(s) and Number(s) Judy echeverria [phone]     Communicated SADE with patient/caregiver Date not available/Unable to determine     Discharge Plan A Home with family;Home Health     DME Needed Upon Discharge  none     Transition of Care Barriers None     Why the patient remains in the hospital Requires continued medical care        Post-Acute Status    Post-Acute Authorization Home Health     Home Health Status Pending medical clearance/testing     Hospital Resources/Appts/Education Provided Provided patient/caregiver with written discharge plan information     Patient choice form signed by patient/caregiver List with quality metrics by geographic area provided     Discharge Delays None known at this time

## 2024-03-29 LAB
ANION GAP SERPL CALC-SCNC: 10 MEQ/L
BASOPHILS # BLD AUTO: 0.01 X10(3)/MCL
BASOPHILS NFR BLD AUTO: 0.3 %
BUN SERPL-MCNC: 37.7 MG/DL (ref 8.4–25.7)
CALCIUM SERPL-MCNC: 8.6 MG/DL (ref 8.4–10.2)
CHLORIDE SERPL-SCNC: 113 MMOL/L (ref 98–107)
CO2 SERPL-SCNC: 20 MMOL/L (ref 22–29)
CREAT SERPL-MCNC: 1.32 MG/DL (ref 0.73–1.18)
CREAT/UREA NIT SERPL: 29
EOSINOPHIL # BLD AUTO: 0.18 X10(3)/MCL (ref 0–0.9)
EOSINOPHIL NFR BLD AUTO: 5.3 %
ERYTHROCYTE [DISTWIDTH] IN BLOOD BY AUTOMATED COUNT: 17.5 % (ref 11.5–17)
GFR SERPLBLD CREATININE-BSD FMLA CKD-EPI: >60 MLS/MIN/1.73/M2
GLUCOSE SERPL-MCNC: 100 MG/DL (ref 70–110)
GLUCOSE SERPL-MCNC: 112 MG/DL (ref 74–100)
GLUCOSE SERPL-MCNC: 125 MG/DL (ref 70–110)
HCT VFR BLD AUTO: 26.5 % (ref 42–52)
HGB BLD-MCNC: 8.1 G/DL (ref 14–18)
IMM GRANULOCYTES # BLD AUTO: 0.01 X10(3)/MCL (ref 0–0.04)
IMM GRANULOCYTES NFR BLD AUTO: 0.3 %
LYMPHOCYTES # BLD AUTO: 1.43 X10(3)/MCL (ref 0.6–4.6)
LYMPHOCYTES NFR BLD AUTO: 42.2 %
MCH RBC QN AUTO: 26.9 PG (ref 27–31)
MCHC RBC AUTO-ENTMCNC: 30.6 G/DL (ref 33–36)
MCV RBC AUTO: 88 FL (ref 80–94)
MONOCYTES # BLD AUTO: 0.23 X10(3)/MCL (ref 0.1–1.3)
MONOCYTES NFR BLD AUTO: 6.8 %
NEUTROPHILS # BLD AUTO: 1.53 X10(3)/MCL (ref 2.1–9.2)
NEUTROPHILS NFR BLD AUTO: 45.1 %
PLATELET # BLD AUTO: 63 X10(3)/MCL (ref 130–400)
PMV BLD AUTO: 11.2 FL (ref 7.4–10.4)
POTASSIUM SERPL-SCNC: 4.9 MMOL/L (ref 3.5–5.1)
RBC # BLD AUTO: 3.01 X10(6)/MCL (ref 4.7–6.1)
SODIUM SERPL-SCNC: 143 MMOL/L (ref 136–145)
WBC # SPEC AUTO: 3.39 X10(3)/MCL (ref 4.5–11.5)

## 2024-03-29 PROCEDURE — 25000003 PHARM REV CODE 250: Performed by: INTERNAL MEDICINE

## 2024-03-29 PROCEDURE — 94760 N-INVAS EAR/PLS OXIMETRY 1: CPT

## 2024-03-29 PROCEDURE — 99900035 HC TECH TIME PER 15 MIN (STAT)

## 2024-03-29 PROCEDURE — 27000207 HC ISOLATION

## 2024-03-29 PROCEDURE — 97530 THERAPEUTIC ACTIVITIES: CPT | Mod: CQ

## 2024-03-29 PROCEDURE — 63600175 PHARM REV CODE 636 W HCPCS: Performed by: PHYSICIAN ASSISTANT

## 2024-03-29 PROCEDURE — A4216 STERILE WATER/SALINE, 10 ML: HCPCS | Performed by: INTERNAL MEDICINE

## 2024-03-29 PROCEDURE — 25000003 PHARM REV CODE 250: Performed by: PHYSICIAN ASSISTANT

## 2024-03-29 PROCEDURE — 11000004 HC SNF PRIVATE

## 2024-03-29 PROCEDURE — 80048 BASIC METABOLIC PNL TOTAL CA: CPT | Performed by: PHYSICIAN ASSISTANT

## 2024-03-29 PROCEDURE — 85025 COMPLETE CBC W/AUTO DIFF WBC: CPT | Performed by: PHYSICIAN ASSISTANT

## 2024-03-29 PROCEDURE — 84133 ASSAY OF URINE POTASSIUM: CPT | Performed by: PHYSICIAN ASSISTANT

## 2024-03-29 PROCEDURE — 25000003 PHARM REV CODE 250: Performed by: STUDENT IN AN ORGANIZED HEALTH CARE EDUCATION/TRAINING PROGRAM

## 2024-03-29 RX ORDER — CARVEDILOL 3.12 MG/1
6.25 TABLET ORAL 2 TIMES DAILY
Status: DISCONTINUED | OUTPATIENT
Start: 2024-03-29 | End: 2024-03-30

## 2024-03-29 RX ADMIN — COLLAGENASE SANTYL: 250 OINTMENT TOPICAL at 08:03

## 2024-03-29 RX ADMIN — SODIUM CHLORIDE, PRESERVATIVE FREE 10 ML: 5 INJECTION INTRAVENOUS at 05:03

## 2024-03-29 RX ADMIN — LOPERAMIDE HYDROCHLORIDE 4 MG: 2 CAPSULE ORAL at 08:03

## 2024-03-29 RX ADMIN — PANTOPRAZOLE SODIUM 40 MG: 40 TABLET, DELAYED RELEASE ORAL at 08:03

## 2024-03-29 RX ADMIN — Medication 1 CAPSULE: at 05:03

## 2024-03-29 RX ADMIN — ONDANSETRON 4 MG: 2 INJECTION INTRAMUSCULAR; INTRAVENOUS at 07:03

## 2024-03-29 RX ADMIN — FERROUS SULFATE TAB 325 MG (65 MG ELEMENTAL FE) 1 EACH: 325 (65 FE) TAB at 08:03

## 2024-03-29 RX ADMIN — Medication 1 CAPSULE: at 01:03

## 2024-03-29 RX ADMIN — Medication 1 CAPSULE: at 08:03

## 2024-03-29 RX ADMIN — MICONAZOLE NITRATE: 20 CREAM TOPICAL at 09:03

## 2024-03-29 RX ADMIN — SODIUM CHLORIDE, PRESERVATIVE FREE 10 ML: 5 INJECTION INTRAVENOUS at 12:03

## 2024-03-29 RX ADMIN — Medication 10 ML: at 07:03

## 2024-03-29 RX ADMIN — SITAGLIPTIN 100 MG: 50 TABLET, FILM COATED ORAL at 08:03

## 2024-03-29 RX ADMIN — CARVEDILOL 6.25 MG: 3.12 TABLET, FILM COATED ORAL at 09:03

## 2024-03-29 RX ADMIN — SODIUM CHLORIDE, PRESERVATIVE FREE 10 ML: 5 INJECTION INTRAVENOUS at 01:03

## 2024-03-29 RX ADMIN — OXYCODONE HYDROCHLORIDE AND ACETAMINOPHEN 500 MG: 500 TABLET ORAL at 08:03

## 2024-03-29 RX ADMIN — MICONAZOLE NITRATE: 20 CREAM TOPICAL at 08:03

## 2024-03-29 RX ADMIN — SODIUM CHLORIDE, PRESERVATIVE FREE 10 ML: 5 INJECTION INTRAVENOUS at 11:03

## 2024-03-29 RX ADMIN — CETIRIZINE HYDROCHLORIDE 10 MG: 10 TABLET, FILM COATED ORAL at 08:03

## 2024-03-29 RX ADMIN — ACETAMINOPHEN 650 MG: 325 TABLET ORAL at 09:03

## 2024-03-29 RX ADMIN — CARVEDILOL 3.12 MG: 3.12 TABLET, FILM COATED ORAL at 08:03

## 2024-03-29 NOTE — PLAN OF CARE
Problem: Adult Inpatient Plan of Care  Goal: Plan of Care Review  Outcome: Ongoing, Progressing  Flowsheets (Taken 3/29/2024 1443)  Plan of Care Reviewed With: patient  Goal: Patient-Specific Goal (Individualized)  Outcome: Ongoing, Progressing  Flowsheets (Taken 3/29/2024 1443)  Anxieties, Fears or Concerns: none expressed at this time  Individualized Care Needs: monitor blood pressure, ensure wound vac is in place, monitor CBGs, turn q2  Goal: Absence of Hospital-Acquired Illness or Injury  Outcome: Ongoing, Progressing  Intervention: Identify and Manage Fall Risk  Flowsheets (Taken 3/29/2024 1443)  Safety Promotion/Fall Prevention:   assistive device/personal item within reach   nonskid shoes/socks when out of bed  Intervention: Prevent Skin Injury  Flowsheets (Taken 3/29/2024 1443)  Body Position: turned  Skin Protection:   adhesive use limited   incontinence pads utilized   tubing/devices free from skin contact  Intervention: Prevent and Manage VTE (Venous Thromboembolism) Risk  Flowsheets (Taken 3/29/2024 1443)  Activity Management: Patient unable to perform activities  VTE Prevention/Management:   bleeding precations maintained   bleeding risk assessed  Range of Motion: ROM (range of motion) performed  Intervention: Prevent Infection  Flowsheets (Taken 3/29/2024 1443)  Infection Prevention:   cohorting utilized   environmental surveillance performed   equipment surfaces disinfected   personal protective equipment utilized   rest/sleep promoted   single patient room provided   hand hygiene promoted  Goal: Optimal Comfort and Wellbeing  Outcome: Ongoing, Progressing  Intervention: Monitor Pain and Promote Comfort  Flowsheets (Taken 3/29/2024 1443)  Pain Management Interventions:   care clustered   quiet environment facilitated   relaxation techniques promoted   position adjusted   pillow support provided  Intervention: Provide Person-Centered Care  Flowsheets (Taken 3/29/2024 1443)  Trust  Relationship/Rapport:   care explained   questions encouraged   choices provided   reassurance provided   emotional support provided   thoughts/feelings acknowledged   empathic listening provided   questions answered  Goal: Readiness for Transition of Care  Outcome: Ongoing, Progressing  Intervention: Mutually Develop Transition Plan  Flowsheets (Taken 3/29/2024 1443)  Communicated SADE with patient/caregiver: Date not available/Unable to determine     Problem: Diabetes Comorbidity  Goal: Blood Glucose Level Within Targeted Range  Outcome: Ongoing, Progressing  Intervention: Monitor and Manage Glycemia  Flowsheets (Taken 3/29/2024 1443)  Glycemic Management: blood glucose monitored     Problem: Skin Injury Risk Increased  Goal: Skin Health and Integrity  Outcome: Ongoing, Progressing  Intervention: Optimize Skin Protection  Flowsheets (Taken 3/29/2024 1443)  Pressure Reduction Techniques:   frequent weight shift encouraged   positioned off wounds   heels elevated off bed   pressure points protected  Pressure Reduction Devices:   positioning supports utilized   pressure-redistributing mattress utilized   heel offloading device utilized  Skin Protection:   adhesive use limited   incontinence pads utilized   tubing/devices free from skin contact  Head of Bed (HOB) Positioning: HOB at 30-45 degrees  Intervention: Promote and Optimize Oral Intake  Flowsheets (Taken 3/29/2024 1443)  Oral Nutrition Promotion:   calorie-dense foods provided   calorie-dense liquids provided   safe use of adaptive equipment encouraged     Problem: Impaired Wound Healing  Goal: Optimal Wound Healing  Outcome: Ongoing, Progressing  Intervention: Promote Wound Healing  Flowsheets (Taken 3/29/2024 1443)  Oral Nutrition Promotion:   calorie-dense foods provided   calorie-dense liquids provided   safe use of adaptive equipment encouraged  Sleep/Rest Enhancement:   awakenings minimized   relaxation techniques promoted  Activity Management: Patient  unable to perform activities  Pain Management Interventions:   care clustered   quiet environment facilitated   relaxation techniques promoted   position adjusted   pillow support provided     Problem: Fall Injury Risk  Goal: Absence of Fall and Fall-Related Injury  Outcome: Ongoing, Progressing  Intervention: Identify and Manage Contributors  Flowsheets (Taken 3/29/2024 1443)  Self-Care Promotion:   independence encouraged   safe use of adaptive equipment encouraged   meal set-up provided   BADL personal routines maintained   BADL personal objects within reach  Medication Review/Management:   medications reviewed   high-risk medications identified  Intervention: Promote Injury-Free Environment  Flowsheets (Taken 3/29/2024 1443)  Safety Promotion/Fall Prevention:   assistive device/personal item within reach   nonskid shoes/socks when out of bed     Problem: Infection  Goal: Absence of Infection Signs and Symptoms  Outcome: Ongoing, Progressing  Intervention: Prevent or Manage Infection  Flowsheets (Taken 3/29/2024 1443)  Fever Reduction/Comfort Measures:   lightweight bedding   lightweight clothing  Infection Management: aseptic technique maintained  Isolation Precautions:   precautions maintained   contact

## 2024-03-29 NOTE — PROGRESS NOTES
[facility] - Medical Surgical Unit  [facility] MEDICINE ~ PROGRESS NOTE  CHIEF COMPLAINT     Hospital follow up for nonhealing wound    HOSPITAL COURSE     58-year-old man with quadriplegic spinal paralysis and numerous decubitus ulcers who is followed by wound care is brought in today due to fever. He had wound cultures done 3 days ago that have grown Proteus mirabilis and Klebsiella pneumoniae. Sensitivities are not complete. The wounds were noted to have significant odor and heavy green drainage. In addition the patient had some nausea and vomiting and home health noted that his blood pressure was dipping. He was advised to come to the emergency room. Wound cultures were drawn from the right hip wound. His wife reports that when the wound is pressed above pus comes out. Both Klebsiella and Proteus maybe treated with fluoroquinolones which are both IV and oral. The patient's significant other preferred if the patient came home so that she could attend to his wounds however the patient expressed a desire to be admitted. Wound culture revealed VRE, Proteus, Pseudomonas, Klebsiella. H&H and platelets continue to decrease, suspect Zyvox induced myelosuppression. Appropriate antibiotics (Tobramycin and Zyvox) were initiated on 03/02/2024 therefore he has received 26 days of antibiotic therapy. Patient is noted to have chronic osteomyelitis therefore he would only require 2 weeks of antibiotic therapy for skin and soft tissue infection. Antibiotics discontinued on 03/28/2024 due to risk vs benefits of continuing antibiotic therapy in the setting of myelosuppression and continue with aggressive wound care. Patient did require the addition of Diltiazem for atrial flutter rate control which was subsequently changed to Coreg due to hypotension.    Today:  Increasing Coreg today. Potassium wnl. No complaints today.     OBJECTIVE/PHYSICAL EXAM     VITAL SIGNS (MOST RECENT):  Temp: 97.5 °F (36.4 °C) (03/29/24  0753)  Pulse: (!) 126 (03/29/24 0857)  Resp: 18 (03/28/24 2052)  BP: 135/77 (03/29/24 0857)  SpO2: 98 % (03/29/24 0753) VITAL SIGNS (24 HOUR RANGE):  Temp:  [97.1 °F (36.2 °C)-98 °F (36.7 °C)] 97.5 °F (36.4 °C)  Pulse:  [122-134] 126  Resp:  [18-98] 18  SpO2:  [98 %-99 %] 98 %  BP: (135-168)/() 135/77     GENERAL: In no acute distress, afebrile  HEENT:  PERRLA  CHEST: Clear to auscultation bilaterally  HEART: S1, S2, no appreciable murmur  ABDOMEN: Soft, nontender, BS +  MSK: Warm, no lower extremity edema, no clubbing or cyanosis  NEUROLOGIC: Alert and oriented x4  INTEGUMENTARY:  See media for wounds    ASSESSMENT/PLAN     Nonhealing chronic wound   VRE, Proteus, Pseudomonas, Klebsiella   Chronic osteomyelitis   S/p debridement with Dr. Jean on 02/27/2024  Zyvox and Tobramycin 03/02/2024-03/28/2024  History of chronic osteomyelitis - only needs 2 weeks of antibiotic therapy for acute skin and soft tissue infection  Discontinue antibiotics 03/28/2024 due to risk vs benefits of continuing antibiotic therapy in the setting of myelosuppression and continue with aggressive wound care  Wound VAC and wound care  Appreciate dietary recommendations for supplements     Atrial flutter  Hypertension/Hypotension   Diltiazem started 3/16/2024-03/28/2024 and required multiple adjustments due to hypotension and tachycardia   Coreg 3.125mg BID on 03/28/2024, increasing to 6.25mg BID today   Continue cardiac monitoring   No AC 2/2 patient request as he has has prior episodes of acute blood loss from wounds - aspirin d/c 02/28/2024  Hypertensive episodes are associated with pain    Post op anemia   Thrombocytopenia   Myelosuppression 2/2 Zyvox   H&H improved s/p 2u pRBC 02/28/2024 and 1u pRBC on 02/29/2024  No anticoagulation at this time - aspirin d/c 02/28/2024  Peripheral smear without significant findings   Discontinue antibiotic   Monitor labs closely      Hyperglycemia/hypoglycemia in setting of DM  A1c 02/23/2024  8.5%  Reports taking 80u Levemir BID  Continue home Sitagliptin  Continue titrating insulin regimen as needed    GISSEL - improving   Hyperkalemia - resolved   Renal function improving  24 hour urine potassium in process    Left upper extremity swelling - resolved   Ultrasound without DVT     Hx of: Neurogenic bladder, Quadriplegia  Will need to change suprapubic catheter soon per patient     DVT prophylaxis:  SCDs, history of bleeding in the recent past    Anticipated discharge and disposition: home with [facility] when antibiotics are complete on 04/12/2024  __________________________________________________________________________    LABS/MICRO/MEDS/DIAGNOSTICS     LABS  Recent Labs     03/29/24  0526      K 4.9   CHLORIDE 113*   CO2 20*   BUN 37.7*   CREATININE 1.32*   GLUCOSE 112*   CALCIUM 8.6     Recent Labs     03/29/24  0526   WBC 3.39*   RBC 3.01*   HCT 26.5*   MCV 88.0   PLT 63*     MICROBIOLOGY  Microbiology Results (last 7 days)       ** No results found for the last 168 hours. **             MEDICATIONS   acetaminophen  650 mg Oral QHS    ascorbic acid (vitamin C)  500 mg Oral Daily    carvediloL  3.125 mg Oral BID    cetirizine  10 mg Oral Daily    collagenase   Topical (Top) Daily    ferrous sulfate  1 tablet Oral Daily    fluconazole  200 mg Oral Weekly    insulin detemir U-100  15 Units Subcutaneous QHS    Lactobacillus acidophilus  1 capsule Oral TID WM    loperamide  4 mg Oral Daily    miconazole   Topical (Top) BID    pantoprazole  40 mg Oral Daily    SITagliptin phosphate  100 mg Oral Daily    sodium chloride 0.9%  10 mL Intravenous Q6H         INFUSIONS       DIAGNOSTIC TESTS  US Upper Extremity Veins Left   Final Result      No venous thrombosis identified.         Electronically signed by: Andrade Perez   Date:    03/18/2024   Time:    21:08      X-Ray Chest 1 View   Final Result      Left PICC tip overlies the mid SVC.         Electronically signed by: Oj Solomon   Date:    02/28/2024    Time:    14:29         EF   Date Value Ref Range Status   05/09/2023 60 % Final   07/18/2022 40 % Final        NUTRITION STATUS  Patient meets ASPEN criteria for   malnutrition of   per RD assessment as evidenced by:                       A minimum of two characteristics is recommended for diagnosis of either severe or non-severe malnutrition.     Case related differential diagnoses have been reviewed; assessment and plan has been documented. I have personally reviewed the labs and test results that are currently available; I have reviewed the patients medication list. I have reviewed the consulting providers recommendations. I have reviewed or attempted to review medical records based upon their availability.  All of the patient's and/or family's questions have been addressed and answered to the best of my ability.  I will continue to monitor closely and make adjustments to medical management as needed.  This document was created using [employer] Fluency Direct.  Transcription errors may have been made.  Please contact me if any questions may rise regarding documentation to clarify transcription.      ANTHONY Vazquez   Internal Medicine  Department of Hospital Medicine [facility] - [facility] Surgical Unit

## 2024-03-29 NOTE — PLAN OF CARE
Problem: Adult Inpatient Plan of Care  Goal: Plan of Care Review  Outcome: Ongoing, Progressing  Flowsheets (Taken 3/28/2024 2000)  Plan of Care Reviewed With: patient     Problem: Diabetes Comorbidity  Goal: Blood Glucose Level Within Targeted Range  Outcome: Ongoing, Progressing  Intervention: Monitor and Manage Glycemia  Flowsheets (Taken 3/28/2024 2000)  Glycemic Management: blood glucose monitored     Problem: Skin Injury Risk Increased  Goal: Skin Health and Integrity  Outcome: Ongoing, Progressing  Intervention: Optimize Skin Protection  Flowsheets (Taken 3/28/2024 2000)  Pressure Reduction Techniques:   frequent weight shift encouraged   positioned off wounds  Pressure Reduction Devices: specialty bed utilized  Skin Protection:   incontinence pads utilized   tubing/devices free from skin contact  Head of Bed (HOB) Positioning: HOB at 20-30 degrees  Intervention: Promote and Optimize Oral Intake  Flowsheets (Taken 3/28/2024 2000)  Oral Nutrition Promotion:   calorie-dense foods provided   calorie-dense liquids provided     Problem: Impaired Wound Healing  Goal: Optimal Wound Healing  Outcome: Ongoing, Progressing  Intervention: Promote Wound Healing  Flowsheets (Taken 3/28/2024 2000)  Oral Nutrition Promotion:   calorie-dense foods provided   calorie-dense liquids provided  Sleep/Rest Enhancement:   awakenings minimized   relaxation techniques promoted   regular sleep/rest pattern promoted  Activity Management: Rolling - L1  Pain Management Interventions:   quiet environment facilitated   relaxation techniques promoted   pain management plan reviewed with patient/caregiver

## 2024-03-29 NOTE — PT/OT/SLP PROGRESS
Physical Therapy Treatment Note           Name: Antonio Galvan II    : 1965 (58 y.o.)  MRN: [id number]           TREATMENT SUMMARY AND RECOMMENDATIONS:    PT Received On: 24  PT Start Time: 0950     PT Stop Time: 1000  PT Total Time (min): 10 min     Subjective Assessment:  x No complaints  Lethargic    Awake, alert, cooperative  Uncooperative    Agitated  c/o pain    Appropriate  c/o fatigue    Confused  Treated at bedside     Emotionally labile  Treated in gym/dept.    Impulsive  Other:    Flat affect       Therapy Precautions:    Cognitive deficits  Spinal precautions    Collar - hard  Sternal precautions    Collar - soft   TLSO    Fall risk  LSO    Hip precautions - posterior  Knee immobilizer    Hip precautions - anterior  WBAT    Impaired communication  Partial weightbearing    Oxygen  TTWB    PEG tube  NWB    Visual deficits  Other:    Hearing deficits          Treatment Objectives:     Mobility Training:   Assist level Comments    Bed mobility     Transfer totalA Wilberto transfer bed-WC   Gait     Sit to stand transitions     Sitting balance     Standing balance      Wheelchair mobility     Car transfer     Other:          Therapeutic Exercise:   Exercise Sets Reps Comments                               Additional Comments:  Pt to sit in WC for 2 hours.... woundvac and catheter in tow    Assessment: Patient tolerated session well    PT Plan: continue  Revisions made to plan of care: No    GOALS:   Multidisciplinary Problems       Physical Therapy Goals          Problem: Physical Therapy    Goal Priority Disciplines Outcome Goal Variances Interventions   Physical Therapy Goal     PT, PT/OT Ongoing, Progressing     Description: Goals to be met by: Discharge     Pt to be seen for ROM tasks 1-2 x/day to ensure proper positioning can be obtained to overall reduce impacts of prolonged bed rest and skin break down    Progress pending healing stages of current wounds    Patient will increase  functional independence with mobility by performin. Pt to tolerate PROM tasks to B UE and LE, while obtaining 25% improvement in range.   2. Sitting EOB to tolerance pending wound healing - with pt demonstrating normal BP  3. Progress to OOB into chair once able. - Met  4. Pt to tolerate up to 3 hours in power chair - Met  5. Caregivers to be able to use tess with 1-2 people.                          Skilled PT Minutes Provided: 10   Communication with Treatment Team:     Equipment recommendations:       At end of treatment, patient remained:  x Up in chair     Up in wheelchair in room    In bed    With alarm activated    Bed rails up    Call bell in reach    x Family/friends present    Restraints secured properly    In bathroom with CNA/RN notified    Nurse aware    In gym with therapist/tech    Other:

## 2024-03-30 LAB
GLUCOSE SERPL-MCNC: 130 MG/DL (ref 70–110)
GLUCOSE SERPL-MCNC: 160 MG/DL (ref 70–110)
POTASSIUM 24H UR-SCNC: 36 MMOL/DAY (ref 25–125)
POTASSIUM UR-SCNC: 24 MMOL/L
TOTAL VOLUME  (OHS): 1500 ML

## 2024-03-30 PROCEDURE — 94760 N-INVAS EAR/PLS OXIMETRY 1: CPT

## 2024-03-30 PROCEDURE — 25000003 PHARM REV CODE 250: Performed by: INTERNAL MEDICINE

## 2024-03-30 PROCEDURE — 11000004 HC SNF PRIVATE

## 2024-03-30 PROCEDURE — A4216 STERILE WATER/SALINE, 10 ML: HCPCS | Performed by: INTERNAL MEDICINE

## 2024-03-30 PROCEDURE — 25000003 PHARM REV CODE 250: Performed by: STUDENT IN AN ORGANIZED HEALTH CARE EDUCATION/TRAINING PROGRAM

## 2024-03-30 PROCEDURE — 99900035 HC TECH TIME PER 15 MIN (STAT)

## 2024-03-30 PROCEDURE — 27000207 HC ISOLATION

## 2024-03-30 PROCEDURE — 25000003 PHARM REV CODE 250: Performed by: PHYSICIAN ASSISTANT

## 2024-03-30 PROCEDURE — 63600175 PHARM REV CODE 636 W HCPCS: Performed by: STUDENT IN AN ORGANIZED HEALTH CARE EDUCATION/TRAINING PROGRAM

## 2024-03-30 RX ORDER — CARVEDILOL 12.5 MG/1
12.5 TABLET ORAL 2 TIMES DAILY
Status: DISCONTINUED | OUTPATIENT
Start: 2024-03-30 | End: 2024-03-31

## 2024-03-30 RX ORDER — LOPERAMIDE HYDROCHLORIDE 2 MG/1
2 CAPSULE ORAL 4 TIMES DAILY PRN
Status: DISCONTINUED | OUTPATIENT
Start: 2024-03-30 | End: 2024-04-09 | Stop reason: HOSPADM

## 2024-03-30 RX ADMIN — SODIUM CHLORIDE, PRESERVATIVE FREE 10 ML: 5 INJECTION INTRAVENOUS at 05:03

## 2024-03-30 RX ADMIN — LOPERAMIDE HYDROCHLORIDE 4 MG: 2 CAPSULE ORAL at 09:03

## 2024-03-30 RX ADMIN — INSULIN DETEMIR 15 UNITS: 100 INJECTION, SOLUTION SUBCUTANEOUS at 09:03

## 2024-03-30 RX ADMIN — OXYCODONE HYDROCHLORIDE AND ACETAMINOPHEN 500 MG: 500 TABLET ORAL at 09:03

## 2024-03-30 RX ADMIN — HYDROCODONE BITARTRATE AND ACETAMINOPHEN 1 TABLET: 7.5; 325 TABLET ORAL at 09:03

## 2024-03-30 RX ADMIN — ACETAMINOPHEN 650 MG: 325 TABLET ORAL at 09:03

## 2024-03-30 RX ADMIN — SODIUM CHLORIDE, PRESERVATIVE FREE 10 ML: 5 INJECTION INTRAVENOUS at 11:03

## 2024-03-30 RX ADMIN — Medication 1 CAPSULE: at 09:03

## 2024-03-30 RX ADMIN — Medication 1 CAPSULE: at 05:03

## 2024-03-30 RX ADMIN — MICONAZOLE NITRATE: 20 CREAM TOPICAL at 09:03

## 2024-03-30 RX ADMIN — CETIRIZINE HYDROCHLORIDE 10 MG: 10 TABLET, FILM COATED ORAL at 09:03

## 2024-03-30 RX ADMIN — ALTEPLASE 2 MG: 2.2 INJECTION, POWDER, LYOPHILIZED, FOR SOLUTION INTRAVENOUS at 11:03

## 2024-03-30 RX ADMIN — SITAGLIPTIN 100 MG: 50 TABLET, FILM COATED ORAL at 09:03

## 2024-03-30 RX ADMIN — COLLAGENASE SANTYL: 250 OINTMENT TOPICAL at 09:03

## 2024-03-30 RX ADMIN — FERROUS SULFATE TAB 325 MG (65 MG ELEMENTAL FE) 1 EACH: 325 (65 FE) TAB at 09:03

## 2024-03-30 RX ADMIN — CARVEDILOL 6.25 MG: 3.12 TABLET, FILM COATED ORAL at 09:03

## 2024-03-30 RX ADMIN — PANTOPRAZOLE SODIUM 40 MG: 40 TABLET, DELAYED RELEASE ORAL at 09:03

## 2024-03-30 RX ADMIN — Medication 1 CAPSULE: at 11:03

## 2024-03-30 RX ADMIN — CARVEDILOL 12.5 MG: 12.5 TABLET, FILM COATED ORAL at 08:03

## 2024-03-30 NOTE — PLAN OF CARE
Problem: Adult Inpatient Plan of Care  Goal: Plan of Care Review  Outcome: Ongoing, Progressing  Flowsheets (Taken 3/30/2024 0751)  Plan of Care Reviewed With:   patient   significant other  Goal: Patient-Specific Goal (Individualized)  Outcome: Ongoing, Progressing  Flowsheets (Taken 3/30/2024 0751)  Anxieties, Fears or Concerns: none expressed at this time  Individualized Care Needs: monitor HR, monitor blood glucose, wound care daily  Goal: Absence of Hospital-Acquired Illness or Injury  Outcome: Ongoing, Progressing  Intervention: Identify and Manage Fall Risk  Flowsheets (Taken 3/30/2024 0751)  Safety Promotion/Fall Prevention:   assistive device/personal item within reach   bed alarm set   nonskid shoes/socks when out of bed   side rails raised x 3  Intervention: Prevent Skin Injury  Flowsheets (Taken 3/30/2024 0751)  Body Position:   turned   upper extremity elevated  Skin Protection:   adhesive use limited   incontinence pads utilized   tubing/devices free from skin contact  Intervention: Prevent and Manage VTE (Venous Thromboembolism) Risk  Flowsheets (Taken 3/30/2024 0751)  Activity Management: Rolling - L1  VTE Prevention/Management:   bleeding precations maintained   bleeding risk assessed   fluids promoted  Range of Motion: ROM (range of motion) performed  Intervention: Prevent Infection  Flowsheets (Taken 3/30/2024 0751)  Infection Prevention:   personal protective equipment utilized   cohorting utilized   equipment surfaces disinfected   single patient room provided   rest/sleep promoted  Goal: Optimal Comfort and Wellbeing  Outcome: Ongoing, Progressing  Intervention: Monitor Pain and Promote Comfort  Flowsheets (Taken 3/30/2024 0751)  Pain Management Interventions:   care clustered   pillow support provided   position adjusted   relaxation techniques promoted   quiet environment facilitated  Intervention: Provide Person-Centered Care  Flowsheets (Taken 3/30/2024 0751)  Trust  Relationship/Rapport:   care explained   questions encouraged   choices provided   reassurance provided   emotional support provided   thoughts/feelings acknowledged   empathic listening provided   questions answered  Goal: Readiness for Transition of Care  Outcome: Ongoing, Progressing  Intervention: Mutually Develop Transition Plan  Flowsheets (Taken 3/30/2024 0751)  Communicated SADE with patient/caregiver: Date not available/Unable to determine     Problem: Diabetes Comorbidity  Goal: Blood Glucose Level Within Targeted Range  Outcome: Ongoing, Progressing  Intervention: Monitor and Manage Glycemia  Flowsheets (Taken 3/30/2024 0751)  Glycemic Management:   blood glucose monitored   supplemental insulin given     Problem: Skin Injury Risk Increased  Goal: Skin Health and Integrity  Outcome: Ongoing, Progressing  Intervention: Optimize Skin Protection  Flowsheets (Taken 3/30/2024 0751)  Pressure Reduction Techniques:   frequent weight shift encouraged   weight shift assistance provided   positioned off wounds   heels elevated off bed   pressure points protected  Pressure Reduction Devices:   pressure-redistributing mattress utilized   heel offloading device utilized   positioning supports utilized   specialty bed utilized  Skin Protection:   adhesive use limited   incontinence pads utilized   tubing/devices free from skin contact  Head of Bed (HOB) Positioning: HOB at 30-45 degrees  Intervention: Promote and Optimize Oral Intake  Flowsheets (Taken 3/30/2024 0751)  Oral Nutrition Promotion:   calorie-dense foods provided   calorie-dense liquids provided   safe use of adaptive equipment encouraged     Problem: Impaired Wound Healing  Goal: Optimal Wound Healing  Outcome: Ongoing, Progressing  Intervention: Promote Wound Healing  Flowsheets (Taken 3/30/2024 0751)  Oral Nutrition Promotion:   calorie-dense foods provided   calorie-dense liquids provided   safe use of adaptive equipment encouraged  Sleep/Rest  Enhancement:   awakenings minimized   relaxation techniques promoted  Activity Management: Rolling - L1  Pain Management Interventions:   care clustered   pillow support provided   position adjusted   relaxation techniques promoted   quiet environment facilitated     Problem: Fall Injury Risk  Goal: Absence of Fall and Fall-Related Injury  Outcome: Ongoing, Progressing  Intervention: Identify and Manage Contributors  Flowsheets (Taken 3/30/2024 0751)  Self-Care Promotion:   independence encouraged   BADL personal objects within reach   BADL personal routines maintained   meal set-up provided   safe use of adaptive equipment encouraged  Medication Review/Management:   medications reviewed   high-risk medications identified  Intervention: Promote Injury-Free Environment  Flowsheets (Taken 3/30/2024 0751)  Safety Promotion/Fall Prevention:   assistive device/personal item within reach   bed alarm set   nonskid shoes/socks when out of bed   side rails raised x 3     Problem: Infection  Goal: Absence of Infection Signs and Symptoms  Outcome: Ongoing, Progressing  Intervention: Prevent or Manage Infection  Flowsheets (Taken 3/30/2024 0751)  Fever Reduction/Comfort Measures:   lightweight bedding   lightweight clothing  Infection Management: aseptic technique maintained  Isolation Precautions:   precautions maintained   contact

## 2024-03-30 NOTE — PLAN OF CARE
Problem: Adult Inpatient Plan of Care  Goal: Patient-Specific Goal (Individualized)  Outcome: Ongoing, Progressing  Flowsheets (Taken 3/29/2024 1930)  Anxieties, Fears or Concerns: None expressed  Individualized Care Needs: Monitor HR, Monitor blood glucose, Wound care     Problem: Diabetes Comorbidity  Goal: Blood Glucose Level Within Targeted Range  Outcome: Ongoing, Progressing     Problem: Skin Injury Risk Increased  Goal: Skin Health and Integrity  Outcome: Ongoing, Progressing

## 2024-03-30 NOTE — PROGRESS NOTES
[facility]  [facility] MEDICINE ~ PROGRESS NOTE  CHIEF COMPLAINT   Hospital follow up for nonhealing wound    HOSPITAL COURSE   58-year-old man with quadriplegic spinal paralysis and numerous decubitus ulcers who is followed by wound care is brought in today due to fever. He had wound cultures done 3 days ago that have grown Proteus mirabilis and Klebsiella pneumoniae. Sensitivities are not complete. The wounds were noted to have significant odor and heavy green drainage. In addition the patient had some nausea and vomiting and home health noted that his blood pressure was dipping. He was advised to come to the emergency room. Wound cultures were drawn from the right hip wound. His wife reports that when the wound is pressed above pus comes out. Both Klebsiella and Proteus maybe treated with fluoroquinolones which are both IV and oral. The patient's significant other preferred if the patient came home so that she could attend to his wounds however the patient expressed a desire to be admitted. Wound culture revealed VRE, Proteus, Pseudomonas, Klebsiella. H&H and platelets continue to decrease, suspect Zyvox induced myelosuppression. Appropriate antibiotics (Tobramycin and Zyvox) were initiated on 03/02/2024 therefore he has received 26 days of antibiotic therapy. Patient is noted to have chronic osteomyelitis therefore he would only require 2 weeks of antibiotic therapy for skin and soft tissue infection. Antibiotics discontinued on 03/28/2024 due to risk vs benefits of continuing antibiotic therapy in the setting of myelosuppression and continue with aggressive wound care. Patient did require the addition of Diltiazem for atrial flutter rate control which was subsequently changed to Coreg due to hypotension.    Today:  Complaining of frequent bowel movements, added banana flakes and p.r.n. Imodium in addition to scheduled Imodium  OBJECTIVE/PHYSICAL EXAM     VITAL SIGNS (MOST  RECENT):  Temp: 98.1 °F (36.7 °C) (03/30/24 0030)  Pulse: (!) 130 (03/30/24 0942)  Resp: 18 (03/30/24 0953)  BP: (!) 150/100 (03/30/24 0942)  SpO2: 96 % (03/29/24 2020) VITAL SIGNS (24 HOUR RANGE):  Temp:  [98.1 °F (36.7 °C)-98.7 °F (37.1 °C)] 98.1 °F (36.7 °C)  Pulse:  [106-130] 130  Resp:  [18] 18  SpO2:  [96 %] 96 %  BP: (117-160)/() 150/100   GENERAL: In no acute distress, afebrile  HEENT:  PERRLA  CHEST: Clear to auscultation bilaterally  HEART: S1, S2, no appreciable murmur  ABDOMEN: Soft, nontender, BS +  MSK: Warm, no lower extremity edema, no clubbing or cyanosis  NEUROLOGIC: Alert and oriented x4  INTEGUMENTARY:  See media for wounds    ASSESSMENT/PLAN   Nonhealing chronic wound   VRE, Proteus, Pseudomonas, Klebsiella   Chronic osteomyelitis   S/p debridement with Dr. Jean on 02/27/2024  Zyvox and Tobramycin 03/02/2024-03/28/2024  History of chronic osteomyelitis - only needs 2 weeks of antibiotic therapy for acute skin and soft tissue infection  Discontinue antibiotics 03/28/2024 due to risk vs benefits of continuing antibiotic therapy in the setting of myelosuppression and continue with aggressive wound care  Wound VAC and wound care  Appreciate dietary recommendations for supplements     Atrial flutter  Hypertension/Hypotension   Diltiazem started 3/16/2024-03/28/2024 and required multiple adjustments due to hypotension and tachycardia   Coreg increased again on 03/30, monitor response  No AC 2/2 patient request as he has has prior episodes of acute blood loss from wounds - aspirin d/c 02/28/2024  Hypertensive episodes are associated with pain    Post op anemia   Thrombocytopenia   Myelosuppression 2/2 Zyvox   H&H improved s/p 2u pRBC 02/28/2024 and 1u pRBC on 02/29/2024  No anticoagulation at this time - aspirin d/c 02/28/2024  Peripheral smear without significant findings   Discontinue antibiotic      Hyperglycemia/hypoglycemia in setting of DM  A1c 02/23/2024 8.5%  Reports taking 80u  Levemir BID  Continue home Sitagliptin  Continue titrating insulin regimen as needed    GISSEL - improving   Hyperkalemia - resolved   Renal function improving  24 hour urine potassium shows adequate excretion    Left upper extremity swelling - resolved   Ultrasound without DVT     Hx of: Neurogenic bladder, Quadriplegia  Will need to change suprapubic catheter soon per patient     DVT prophylaxis:  SCDs, history of bleeding in the recent past  Anticipated discharge and disposition: home with [facility] when antibiotics are complete on 04/12/2024  __________________________________________________________________________    LABS/MICRO/MEDS/DIAGNOSTICS     LABS  Recent Labs     03/29/24  0526      K 4.9   CHLORIDE 113*   CO2 20*   BUN 37.7*   CREATININE 1.32*   GLUCOSE 112*   CALCIUM 8.6     Recent Labs     03/29/24  0526   WBC 3.39*   RBC 3.01*   HCT 26.5*   MCV 88.0   PLT 63*     MICROBIOLOGY  Microbiology Results (last 7 days)       ** No results found for the last 168 hours. **             MEDICATIONS   acetaminophen  650 mg Oral QHS    ascorbic acid (vitamin C)  500 mg Oral Daily    carvediloL  6.25 mg Oral BID    cetirizine  10 mg Oral Daily    collagenase   Topical (Top) Daily    ferrous sulfate  1 tablet Oral Daily    fluconazole  200 mg Oral Weekly    insulin detemir U-100  15 Units Subcutaneous QHS    Lactobacillus acidophilus  1 capsule Oral TID WM    loperamide  4 mg Oral Daily    miconazole   Topical (Top) BID    pantoprazole  40 mg Oral Daily    SITagliptin phosphate  100 mg Oral Daily    sodium chloride 0.9%  10 mL Intravenous Q6H         INFUSIONS       DIAGNOSTIC TESTS  US Upper Extremity Veins Left   Final Result      No venous thrombosis identified.         Electronically signed by: Andrade Perez   Date:    03/18/2024   Time:    21:08      X-Ray Chest 1 View   Final Result      Left PICC tip overlies the mid SVC.         Electronically signed by: Oj Solomon   Date:    02/28/2024   Time:    14:29          EF   Date Value Ref Range Status   05/09/2023 60 % Final   07/18/2022 40 % Final        NUTRITION STATUS  Patient meets ASPEN criteria for   malnutrition of   per RD assessment as evidenced by:                       A minimum of two characteristics is recommended for diagnosis of either severe or non-severe malnutrition.     Case related differential diagnoses have been reviewed; assessment and plan has been documented. I have personally reviewed the labs and test results that are currently available; I have reviewed the patients medication list. I have reviewed the consulting providers recommendations. I have reviewed or attempted to review medical records based upon their availability.  All of the patient's and/or family's questions have been addressed and answered to the best of my ability.  I will continue to monitor closely and make adjustments to medical management as needed.  This document was created using M*Modal Fluency Direct.  Transcription errors may have been made.  Please contact me if any questions may rise regarding documentation to clarify transcription.      Thairy G Reyes,    Internal Medicine  Department of Hospital Medicine [facility]

## 2024-03-31 LAB
GLUCOSE SERPL-MCNC: 105 MG/DL (ref 70–110)
GLUCOSE SERPL-MCNC: 115 MG/DL (ref 70–110)

## 2024-03-31 PROCEDURE — 25000003 PHARM REV CODE 250: Performed by: STUDENT IN AN ORGANIZED HEALTH CARE EDUCATION/TRAINING PROGRAM

## 2024-03-31 PROCEDURE — 27000207 HC ISOLATION

## 2024-03-31 PROCEDURE — 63600175 PHARM REV CODE 636 W HCPCS: Performed by: STUDENT IN AN ORGANIZED HEALTH CARE EDUCATION/TRAINING PROGRAM

## 2024-03-31 PROCEDURE — 94760 N-INVAS EAR/PLS OXIMETRY 1: CPT

## 2024-03-31 PROCEDURE — 25000003 PHARM REV CODE 250: Performed by: INTERNAL MEDICINE

## 2024-03-31 PROCEDURE — A4216 STERILE WATER/SALINE, 10 ML: HCPCS | Performed by: INTERNAL MEDICINE

## 2024-03-31 PROCEDURE — 99900035 HC TECH TIME PER 15 MIN (STAT)

## 2024-03-31 PROCEDURE — 11000004 HC SNF PRIVATE

## 2024-03-31 PROCEDURE — 25000003 PHARM REV CODE 250: Performed by: PHYSICIAN ASSISTANT

## 2024-03-31 RX ORDER — DILTIAZEM HYDROCHLORIDE 30 MG/1
30 TABLET, FILM COATED ORAL EVERY 6 HOURS
Status: DISCONTINUED | OUTPATIENT
Start: 2024-03-31 | End: 2024-04-02

## 2024-03-31 RX ADMIN — Medication 1 CAPSULE: at 08:03

## 2024-03-31 RX ADMIN — PANTOPRAZOLE SODIUM 40 MG: 40 TABLET, DELAYED RELEASE ORAL at 10:03

## 2024-03-31 RX ADMIN — SODIUM CHLORIDE, PRESERVATIVE FREE 10 ML: 5 INJECTION INTRAVENOUS at 05:03

## 2024-03-31 RX ADMIN — MICONAZOLE NITRATE: 20 CREAM TOPICAL at 10:03

## 2024-03-31 RX ADMIN — HYDROCODONE BITARTRATE AND ACETAMINOPHEN 1 TABLET: 7.5; 325 TABLET ORAL at 10:03

## 2024-03-31 RX ADMIN — HYDROCODONE BITARTRATE AND ACETAMINOPHEN 1 TABLET: 7.5; 325 TABLET ORAL at 12:03

## 2024-03-31 RX ADMIN — SODIUM CHLORIDE, PRESERVATIVE FREE 10 ML: 5 INJECTION INTRAVENOUS at 12:03

## 2024-03-31 RX ADMIN — SITAGLIPTIN 100 MG: 50 TABLET, FILM COATED ORAL at 10:03

## 2024-03-31 RX ADMIN — DILTIAZEM HYDROCHLORIDE 30 MG: 30 TABLET, FILM COATED ORAL at 05:03

## 2024-03-31 RX ADMIN — ACETAMINOPHEN 650 MG: 325 TABLET ORAL at 09:03

## 2024-03-31 RX ADMIN — FERROUS SULFATE TAB 325 MG (65 MG ELEMENTAL FE) 1 EACH: 325 (65 FE) TAB at 10:03

## 2024-03-31 RX ADMIN — INSULIN DETEMIR 15 UNITS: 100 INJECTION, SOLUTION SUBCUTANEOUS at 09:03

## 2024-03-31 RX ADMIN — LOPERAMIDE HYDROCHLORIDE 4 MG: 2 CAPSULE ORAL at 10:03

## 2024-03-31 RX ADMIN — DILTIAZEM HYDROCHLORIDE 30 MG: 30 TABLET, FILM COATED ORAL at 11:03

## 2024-03-31 RX ADMIN — HYDROCODONE BITARTRATE AND ACETAMINOPHEN 1 TABLET: 7.5; 325 TABLET ORAL at 05:03

## 2024-03-31 RX ADMIN — SODIUM CHLORIDE, PRESERVATIVE FREE 10 ML: 5 INJECTION INTRAVENOUS at 11:03

## 2024-03-31 RX ADMIN — OXYCODONE HYDROCHLORIDE AND ACETAMINOPHEN 500 MG: 500 TABLET ORAL at 10:03

## 2024-03-31 RX ADMIN — CETIRIZINE HYDROCHLORIDE 10 MG: 10 TABLET, FILM COATED ORAL at 10:03

## 2024-03-31 RX ADMIN — MICONAZOLE NITRATE: 20 CREAM TOPICAL at 09:03

## 2024-03-31 RX ADMIN — COLLAGENASE SANTYL: 250 OINTMENT TOPICAL at 10:03

## 2024-03-31 NOTE — PLAN OF CARE
Problem: Adult Inpatient Plan of Care  Goal: Plan of Care Review  Outcome: Ongoing, Progressing  Goal: Patient-Specific Goal (Individualized)  Outcome: Ongoing, Progressing  Goal: Absence of Hospital-Acquired Illness or Injury  Outcome: Ongoing, Progressing  Intervention: Identify and Manage Fall Risk  Flowsheets (Taken 3/31/2024 4126)  Safety Promotion/Fall Prevention:   assistive device/personal item within reach   bed alarm set   nonskid shoes/socks when out of bed   Fall Risk reviewed with patient/family   room near unit station   side rails raised x 3   instructed to call staff for mobility  Goal: Optimal Comfort and Wellbeing  Outcome: Ongoing, Progressing  Goal: Readiness for Transition of Care  Outcome: Ongoing, Progressing     Problem: Diabetes Comorbidity  Goal: Blood Glucose Level Within Targeted Range  Outcome: Ongoing, Progressing     Problem: Skin Injury Risk Increased  Goal: Skin Health and Integrity  Outcome: Ongoing, Progressing     Problem: Impaired Wound Healing  Goal: Optimal Wound Healing  Outcome: Ongoing, Progressing

## 2024-03-31 NOTE — PLAN OF CARE
[facility]  Discharge Reassessment    Primary Care Provider: Jennifer Fernando NP    Expected Discharge Date: 4/12/2024    Reassessment (most recent)       Discharge Reassessment - 03/31/24 0946          Discharge Reassessment    Assessment Type Discharge Planning Reassessment     Did the patient's condition or plan change since previous assessment? No     Discharge Plan discussed with: Parent(s);Patient     Name(s) and Number(s) Judy  mother     Communicated SADE with patient/caregiver Date not available/Unable to determine     Discharge Plan A Home with family;Home Health     DME Needed Upon Discharge  none     Transition of Care Barriers None     Why the patient remains in the hospital Requires continued medical care        Post-Acute Status    Post-Acute Authorization Home Health     Home Health Status Pending medical clearance/testing     Hospital Resources/Appts/Education Provided Provided patient/caregiver with written discharge plan information     Discharge Delays None known at this time

## 2024-03-31 NOTE — PROGRESS NOTES
[facility]  [facility] MEDICINE ~ PROGRESS NOTE  CHIEF COMPLAINT   Hospital follow up for nonhealing wound    HOSPITAL COURSE   58-year-old man with quadriplegic spinal paralysis and numerous decubitus ulcers who is followed by wound care is brought in today due to fever. He had wound cultures done 3 days ago that have grown Proteus mirabilis and Klebsiella pneumoniae. Sensitivities are not complete. The wounds were noted to have significant odor and heavy green drainage. In addition the patient had some nausea and vomiting and home health noted that his blood pressure was dipping. He was advised to come to the emergency room. Wound cultures were drawn from the right hip wound. His wife reports that when the wound is pressed above pus comes out. Both Klebsiella and Proteus maybe treated with fluoroquinolones which are both IV and oral. The patient's significant other preferred if the patient came home so that she could attend to his wounds however the patient expressed a desire to be admitted. Wound culture revealed VRE, Proteus, Pseudomonas, Klebsiella. H&H and platelets continue to decrease, suspect Zyvox induced myelosuppression. Appropriate antibiotics (Tobramycin and Zyvox) were initiated on 03/02/2024 therefore he has received 26 days of antibiotic therapy. Patient is noted to have chronic osteomyelitis therefore he would only require 2 weeks of antibiotic therapy for skin and soft tissue infection. Antibiotics discontinued on 03/28/2024 due to risk vs benefits of continuing antibiotic therapy in the setting of myelosuppression and continue with aggressive wound care. Patient did require the addition of Diltiazem for atrial flutter rate control which was subsequently changed to Coreg due to hypotension.    Today:  Patient's atrial fibrillation does not respond to carvedilol, back to diltiazem.  Given that his hemoglobin is improved suspect he will likely tolerate better with less  hypotension.  Still complaining of multiple bowel movements  OBJECTIVE/PHYSICAL EXAM     VITAL SIGNS (MOST RECENT):  Temp: 98.4 °F (36.9 °C) (03/31/24 1022)  Pulse: (!) 130 (03/31/24 1022)  Resp: 16 (03/31/24 0028)  BP: (!) 151/91 (03/31/24 1022)  SpO2: 95 % (03/31/24 1022) VITAL SIGNS (24 HOUR RANGE):  Temp:  [97.9 °F (36.6 °C)-98.4 °F (36.9 °C)] 98.4 °F (36.9 °C)  Pulse:  [128-132] 130  Resp:  [16] 16  SpO2:  [95 %-99 %] 95 %  BP: (133-151)/(90-96) 151/91   GENERAL: In no acute distress, afebrile  HEENT:  PERRLA  CHEST: Clear to auscultation bilaterally  HEART: S1, S2, no appreciable murmur  ABDOMEN: Soft, nontender, BS +  MSK: Warm, no lower extremity edema, no clubbing or cyanosis  NEUROLOGIC: Alert and oriented x4  INTEGUMENTARY:  See media for wounds    ASSESSMENT/PLAN   Nonhealing chronic wound   VRE, Proteus, Pseudomonas, Klebsiella   Chronic osteomyelitis   S/p debridement with Dr. Jean on 02/27/2024  Zyvox and Tobramycin 03/02/2024-03/28/2024  History of chronic osteomyelitis - only needs 2 weeks of antibiotic therapy for acute skin and soft tissue infection  Discontinue antibiotics 03/28/2024 due to risk vs benefits of continuing antibiotic therapy in the setting of myelosuppression and continue with aggressive wound care  Wound VAC and wound care  Appreciate dietary recommendations for supplements     Atrial flutter  Hypertension/Hypotension   Diltiazem started 3/16/2024-03/28/2024 and required multiple adjustments due to hypotension and tachycardia   No improvement with Coreg, diltiazem resumed once again 3/31  No AC 2/2 patient request as he has has prior episodes of acute blood loss from wounds - aspirin d/c 02/28/2024  Hypertensive episodes are associated with pain    Post op anemia   Thrombocytopenia   Myelosuppression 2/2 Zyvox   H&H improved s/p 2u pRBC 02/28/2024 and 1u pRBC on 02/29/2024  No anticoagulation at this time - aspirin d/c 02/28/2024  Peripheral smear without significant  findings   Discontinue antibiotic      Hyperglycemia/hypoglycemia in setting of DM  A1c 02/23/2024 8.5%  Reports taking 80u Levemir BID  Continue home Sitagliptin  Continue titrating insulin regimen as needed    GISSEL - improving   Hyperkalemia - resolved   Renal function improving  24 hour urine potassium shows adequate excretion    Left upper extremity swelling - resolved   Ultrasound without DVT     Hx of: Neurogenic bladder, Quadriplegia  Will need to change suprapubic catheter soon per patient     DVT prophylaxis:  SCDs, history of bleeding in the recent past  Anticipated discharge and disposition: home with [facility] when antibiotics are complete on 04/12/2024  __________________________________________________________________________    LABS/MICRO/MEDS/DIAGNOSTICS     LABS  Recent Labs     03/29/24  0526      K 4.9   CHLORIDE 113*   CO2 20*   BUN 37.7*   CREATININE 1.32*   GLUCOSE 112*   CALCIUM 8.6     Recent Labs     03/29/24  0526   WBC 3.39*   RBC 3.01*   HCT 26.5*   MCV 88.0   PLT 63*     MICROBIOLOGY  Microbiology Results (last 7 days)       ** No results found for the last 168 hours. **             MEDICATIONS   acetaminophen  650 mg Oral QHS    ascorbic acid (vitamin C)  500 mg Oral Daily    cetirizine  10 mg Oral Daily    collagenase   Topical (Top) Daily    diltiaZEM  30 mg Oral Q6H    ferrous sulfate  1 tablet Oral Daily    fluconazole  200 mg Oral Weekly    insulin detemir U-100  15 Units Subcutaneous QHS    loperamide  4 mg Oral Daily    miconazole   Topical (Top) BID    pantoprazole  40 mg Oral Daily    SITagliptin phosphate  100 mg Oral Daily    sodium chloride 0.9%  10 mL Intravenous Q6H         INFUSIONS       DIAGNOSTIC TESTS  US Upper Extremity Veins Left   Final Result      No venous thrombosis identified.         Electronically signed by: Andrade Perez   Date:    03/18/2024   Time:    21:08      X-Ray Chest 1 View   Final Result      Left PICC tip overlies the mid SVC.         Electronically  signed by: Oj Solomon   Date:    02/28/2024   Time:    14:29         EF   Date Value Ref Range Status   05/09/2023 60 % Final   07/18/2022 40 % Final        NUTRITION STATUS  Patient meets ASPEN criteria for   malnutrition of   per RD assessment as evidenced by:                       A minimum of two characteristics is recommended for diagnosis of either severe or non-severe malnutrition.     Case related differential diagnoses have been reviewed; assessment and plan has been documented. I have personally reviewed the labs and test results that are currently available; I have reviewed the patients medication list. I have reviewed the consulting providers recommendations. I have reviewed or attempted to review medical records based upon their availability.  All of the patient's and/or family's questions have been addressed and answered to the best of my ability.  I will continue to monitor closely and make adjustments to medical management as needed.  This document was created using M*Modal Fluency Direct.  Transcription errors may have been made.  Please contact me if any questions may rise regarding documentation to clarify transcription.      Thairy G Reyes, DO   Internal Medicine  Department of Hospital Medicine [facility]

## 2024-03-31 NOTE — PLAN OF CARE
Problem: Adult Inpatient Plan of Care  Goal: Patient-Specific Goal (Individualized)  Outcome: Ongoing, Progressing  Flowsheets (Taken 3/30/2024 1930)  Anxieties, Fears or Concerns: None expressed  Individualized Care Needs: Monitor HR, Monitor blood glucose, Wound care     Problem: Diabetes Comorbidity  Goal: Blood Glucose Level Within Targeted Range  Outcome: Ongoing, Progressing     Problem: Skin Injury Risk Increased  Goal: Skin Health and Integrity  Outcome: Ongoing, Progressing

## 2024-04-01 LAB
ABO + RH BLD: NORMAL
ANION GAP SERPL CALC-SCNC: 8 MEQ/L
BASOPHILS # BLD AUTO: 0.01 X10(3)/MCL
BASOPHILS NFR BLD AUTO: 0.2 %
BLD PROD TYP BPU: NORMAL
BLOOD UNIT EXPIRATION DATE: NORMAL
BLOOD UNIT TYPE CODE: 7300
BUN SERPL-MCNC: 32.3 MG/DL (ref 8.4–25.7)
CALCIUM SERPL-MCNC: 8.4 MG/DL (ref 8.4–10.2)
CHLORIDE SERPL-SCNC: 111 MMOL/L (ref 98–107)
CO2 SERPL-SCNC: 22 MMOL/L (ref 22–29)
CREAT SERPL-MCNC: 1.28 MG/DL (ref 0.73–1.18)
CREAT/UREA NIT SERPL: 25
CROSSMATCH INTERPRETATION: NORMAL
DISPENSE STATUS: NORMAL
EOSINOPHIL # BLD AUTO: 0.15 X10(3)/MCL (ref 0–0.9)
EOSINOPHIL NFR BLD AUTO: 3.6 %
ERYTHROCYTE [DISTWIDTH] IN BLOOD BY AUTOMATED COUNT: 17.5 % (ref 11.5–17)
GFR SERPLBLD CREATININE-BSD FMLA CKD-EPI: >60 MLS/MIN/1.73/M2
GLUCOSE SERPL-MCNC: 118 MG/DL (ref 74–100)
GLUCOSE SERPL-MCNC: 163 MG/DL (ref 70–110)
GLUCOSE SERPL-MCNC: 191 MG/DL (ref 70–110)
GLUCOSE SERPL-MCNC: 238 MG/DL (ref 70–110)
GROUP & RH: NORMAL
HCT VFR BLD AUTO: 23.3 % (ref 42–52)
HGB BLD-MCNC: 7.2 G/DL (ref 14–18)
IMM GRANULOCYTES # BLD AUTO: 0 X10(3)/MCL (ref 0–0.04)
IMM GRANULOCYTES NFR BLD AUTO: 0 %
INDIRECT COOMBS: NORMAL
LYMPHOCYTES # BLD AUTO: 1.74 X10(3)/MCL (ref 0.6–4.6)
LYMPHOCYTES NFR BLD AUTO: 41.5 %
MCH RBC QN AUTO: 26.9 PG (ref 27–31)
MCHC RBC AUTO-ENTMCNC: 30.9 G/DL (ref 33–36)
MCV RBC AUTO: 86.9 FL (ref 80–94)
MONOCYTES # BLD AUTO: 0.48 X10(3)/MCL (ref 0.1–1.3)
MONOCYTES NFR BLD AUTO: 11.5 %
NEUTROPHILS # BLD AUTO: 1.81 X10(3)/MCL (ref 2.1–9.2)
NEUTROPHILS NFR BLD AUTO: 43.2 %
PLATELET # BLD AUTO: 66 X10(3)/MCL (ref 130–400)
PMV BLD AUTO: 10.9 FL (ref 7.4–10.4)
POTASSIUM SERPL-SCNC: 5 MMOL/L (ref 3.5–5.1)
RBC # BLD AUTO: 2.68 X10(6)/MCL (ref 4.7–6.1)
SODIUM SERPL-SCNC: 141 MMOL/L (ref 136–145)
SPECIMEN OUTDATE: NORMAL
UNIT NUMBER: NORMAL
WBC # SPEC AUTO: 4.19 X10(3)/MCL (ref 4.5–11.5)

## 2024-04-01 PROCEDURE — 86850 RBC ANTIBODY SCREEN: CPT | Performed by: PHYSICIAN ASSISTANT

## 2024-04-01 PROCEDURE — 86920 COMPATIBILITY TEST SPIN: CPT | Performed by: PHYSICIAN ASSISTANT

## 2024-04-01 PROCEDURE — 11000004 HC SNF PRIVATE

## 2024-04-01 PROCEDURE — 36430 TRANSFUSION BLD/BLD COMPNT: CPT

## 2024-04-01 PROCEDURE — 85025 COMPLETE CBC W/AUTO DIFF WBC: CPT | Performed by: STUDENT IN AN ORGANIZED HEALTH CARE EDUCATION/TRAINING PROGRAM

## 2024-04-01 PROCEDURE — P9016 RBC LEUKOCYTES REDUCED: HCPCS | Performed by: PHYSICIAN ASSISTANT

## 2024-04-01 PROCEDURE — 99900035 HC TECH TIME PER 15 MIN (STAT)

## 2024-04-01 PROCEDURE — 97606 NEG PRS WND THER DME>50 SQCM: CPT

## 2024-04-01 PROCEDURE — 25000003 PHARM REV CODE 250: Performed by: STUDENT IN AN ORGANIZED HEALTH CARE EDUCATION/TRAINING PROGRAM

## 2024-04-01 PROCEDURE — 80048 BASIC METABOLIC PNL TOTAL CA: CPT | Performed by: STUDENT IN AN ORGANIZED HEALTH CARE EDUCATION/TRAINING PROGRAM

## 2024-04-01 PROCEDURE — 63600175 PHARM REV CODE 636 W HCPCS: Performed by: INTERNAL MEDICINE

## 2024-04-01 PROCEDURE — A4216 STERILE WATER/SALINE, 10 ML: HCPCS | Performed by: INTERNAL MEDICINE

## 2024-04-01 PROCEDURE — 25000003 PHARM REV CODE 250: Performed by: INTERNAL MEDICINE

## 2024-04-01 PROCEDURE — 27000207 HC ISOLATION

## 2024-04-01 PROCEDURE — 25000003 PHARM REV CODE 250: Performed by: PHYSICIAN ASSISTANT

## 2024-04-01 PROCEDURE — 63600175 PHARM REV CODE 636 W HCPCS: Performed by: STUDENT IN AN ORGANIZED HEALTH CARE EDUCATION/TRAINING PROGRAM

## 2024-04-01 PROCEDURE — 94760 N-INVAS EAR/PLS OXIMETRY 1: CPT

## 2024-04-01 PROCEDURE — 97110 THERAPEUTIC EXERCISES: CPT

## 2024-04-01 RX ORDER — HYDROCODONE BITARTRATE AND ACETAMINOPHEN 500; 5 MG/1; MG/1
TABLET ORAL
Status: DISCONTINUED | OUTPATIENT
Start: 2024-04-01 | End: 2024-04-09 | Stop reason: HOSPADM

## 2024-04-01 RX ADMIN — MICONAZOLE NITRATE: 20 CREAM TOPICAL at 09:04

## 2024-04-01 RX ADMIN — HYDROCODONE BITARTRATE AND ACETAMINOPHEN 1 TABLET: 7.5; 325 TABLET ORAL at 09:04

## 2024-04-01 RX ADMIN — DILTIAZEM HYDROCHLORIDE 30 MG: 30 TABLET, FILM COATED ORAL at 05:04

## 2024-04-01 RX ADMIN — SODIUM CHLORIDE, PRESERVATIVE FREE 10 ML: 5 INJECTION INTRAVENOUS at 06:04

## 2024-04-01 RX ADMIN — ACETAMINOPHEN 650 MG: 325 TABLET ORAL at 08:04

## 2024-04-01 RX ADMIN — COLLAGENASE SANTYL: 250 OINTMENT TOPICAL at 09:04

## 2024-04-01 RX ADMIN — DILTIAZEM HYDROCHLORIDE 30 MG: 30 TABLET, FILM COATED ORAL at 11:04

## 2024-04-01 RX ADMIN — SITAGLIPTIN 100 MG: 50 TABLET, FILM COATED ORAL at 09:04

## 2024-04-01 RX ADMIN — INSULIN ASPART 2 UNITS: 100 INJECTION, SOLUTION INTRAVENOUS; SUBCUTANEOUS at 09:04

## 2024-04-01 RX ADMIN — LOPERAMIDE HYDROCHLORIDE 4 MG: 2 CAPSULE ORAL at 09:04

## 2024-04-01 RX ADMIN — PANTOPRAZOLE SODIUM 40 MG: 40 TABLET, DELAYED RELEASE ORAL at 09:04

## 2024-04-01 RX ADMIN — CETIRIZINE HYDROCHLORIDE 10 MG: 10 TABLET, FILM COATED ORAL at 09:04

## 2024-04-01 RX ADMIN — SODIUM CHLORIDE, PRESERVATIVE FREE 10 ML: 5 INJECTION INTRAVENOUS at 05:04

## 2024-04-01 RX ADMIN — MICONAZOLE NITRATE: 20 CREAM TOPICAL at 08:04

## 2024-04-01 RX ADMIN — SODIUM CHLORIDE, PRESERVATIVE FREE 10 ML: 5 INJECTION INTRAVENOUS at 11:04

## 2024-04-01 RX ADMIN — FERROUS SULFATE TAB 325 MG (65 MG ELEMENTAL FE) 1 EACH: 325 (65 FE) TAB at 09:04

## 2024-04-01 RX ADMIN — OXYCODONE HYDROCHLORIDE AND ACETAMINOPHEN 500 MG: 500 TABLET ORAL at 09:04

## 2024-04-01 RX ADMIN — INSULIN DETEMIR 15 UNITS: 100 INJECTION, SOLUTION SUBCUTANEOUS at 08:04

## 2024-04-01 NOTE — PT/OT/SLP PROGRESS
Name: Antonio Dumontfam PATTERSON    : 1965 (58 y.o.)  MRN: [id number]           Patient Unavailable      Patient unable to be seen at this time secondary to: Pt undergoing wound care and receiving blood this AM. PT to return this PM.

## 2024-04-01 NOTE — PLAN OF CARE
Problem: Adult Inpatient Plan of Care  Goal: Plan of Care Review  Outcome: Ongoing, Progressing  Flowsheets (Taken 4/1/2024 0215)  Plan of Care Reviewed With: patient  Goal: Patient-Specific Goal (Individualized)  Outcome: Ongoing, Progressing  Flowsheets (Taken 4/1/2024 0215)  Anxieties, Fears or Concerns: none expressed  Individualized Care Needs: montior CBG, safety, montior B\P and HR  Goal: Absence of Hospital-Acquired Illness or Injury  Outcome: Ongoing, Progressing  Intervention: Identify and Manage Fall Risk  Flowsheets (Taken 4/1/2024 0215)  Safety Promotion/Fall Prevention:   assistive device/personal item within reach   side rails raised x 3   family to remain at bedside   room near unit station  Intervention: Prevent Skin Injury  Flowsheets (Taken 4/1/2024 0215)  Body Position: position maintained  Skin Protection: adhesive use limited  Intervention: Prevent and Manage VTE (Venous Thromboembolism) Risk  Flowsheets (Taken 4/1/2024 0215)  Activity Management: Patient unable to perform activities  VTE Prevention/Management: fluids promoted  Range of Motion: ROM (range of motion) performed  Intervention: Prevent Infection  Flowsheets (Taken 4/1/2024 0215)  Infection Prevention:   cohorting utilized   personal protective equipment utilized   rest/sleep promoted  Goal: Optimal Comfort and Wellbeing  Outcome: Ongoing, Progressing  Intervention: Monitor Pain and Promote Comfort  Flowsheets (Taken 4/1/2024 0215)  Pain Management Interventions: care clustered  Intervention: Provide Person-Centered Care  Flowsheets (Taken 4/1/2024 0215)  Trust Relationship/Rapport:   care explained   choices provided   emotional support provided   empathic listening provided   questions answered   questions encouraged   reassurance provided   thoughts/feelings acknowledged  Goal: Readiness for Transition of Care  Outcome: Ongoing, Progressing  Intervention: Mutually Develop Transition Plan  Flowsheets (Taken 4/1/2024  0215)  Equipment Currently Used at Home:   hospital bed   lift device   power chair  Transportation Anticipated: family or friend will provide  Who are your caregiver(s) and their phone number(s)?: [phone] Judy (mother)  Communicated SADE with patient/caregiver: Date not available/Unable to determine  Do you expect to return to your current living situation?: Yes  Do you have help at home or someone to help you manage your care at home?: Yes  Readmission within 30 days?: No  Do you currently have service(s) that help you manage your care at home?: No  Is the pt/caregiver preference to resume services with current agency: No     Problem: Diabetes Comorbidity  Goal: Blood Glucose Level Within Targeted Range  Outcome: Ongoing, Progressing  Intervention: Monitor and Manage Glycemia  Flowsheets (Taken 4/1/2024 0215)  Glycemic Management: blood glucose monitored     Problem: Skin Injury Risk Increased  Goal: Skin Health and Integrity  Outcome: Ongoing, Progressing     Problem: Impaired Wound Healing  Goal: Optimal Wound Healing  Outcome: Ongoing, Progressing     Problem: Fall Injury Risk  Goal: Absence of Fall and Fall-Related Injury  Outcome: Ongoing, Progressing  Intervention: Identify and Manage Contributors  Flowsheets (Taken 4/1/2024 0215)  Self-Care Promotion:   independence encouraged   BADL personal objects within reach   BADL personal routines maintained   safe use of adaptive equipment encouraged  Medication Review/Management: medications reviewed  Intervention: Promote Injury-Free Environment  Flowsheets (Taken 4/1/2024 0215)  Safety Promotion/Fall Prevention:   assistive device/personal item within reach   side rails raised x 3   family to remain at bedside   room near unit station     Problem: Infection  Goal: Absence of Infection Signs and Symptoms  Outcome: Ongoing, Progressing  Intervention: Prevent or Manage Infection  Flowsheets (Taken 4/1/2024 0215)  Fever Reduction/Comfort Measures:   lightweight  bedding   lightweight clothing  Infection Management: aseptic technique maintained  Isolation Precautions:   contact   precautions maintained

## 2024-04-01 NOTE — PLAN OF CARE
Problem: Adult Inpatient Plan of Care  Goal: Plan of Care Review  Outcome: Ongoing, Progressing  Flowsheets (Taken 4/1/2024 1619)  Plan of Care Reviewed With: patient  Goal: Patient-Specific Goal (Individualized)  Outcome: Ongoing, Progressing  Flowsheets (Taken 4/1/2024 1619)  Anxieties, Fears or Concerns: none expressed at this time  Individualized Care Needs: monitor CBGs, safety maintained, monitor Bp and HR, monitor for s/s of blood transfusion reaction  Goal: Absence of Hospital-Acquired Illness or Injury  Outcome: Ongoing, Progressing  Intervention: Identify and Manage Fall Risk  Flowsheets (Taken 4/1/2024 1619)  Safety Promotion/Fall Prevention:   assistive device/personal item within reach   family to remain at bedside   side rails raised x 3  Intervention: Prevent Skin Injury  Flowsheets (Taken 4/1/2024 1619)  Body Position: sitting up in bed  Skin Protection:   adhesive use limited   incontinence pads utilized   tubing/devices free from skin contact  Intervention: Prevent and Manage VTE (Venous Thromboembolism) Risk  Flowsheets (Taken 4/1/2024 1619)  Activity Management: Patient unable to perform activities  VTE Prevention/Management:   ambulation promoted   bleeding precations maintained   bleeding risk assessed   fluids promoted  Range of Motion: active ROM (range of motion) encouraged  Intervention: Prevent Infection  Flowsheets (Taken 4/1/2024 1619)  Infection Prevention:   cohorting utilized   personal protective equipment utilized   environmental surveillance performed   rest/sleep promoted   hand hygiene promoted   single patient room provided   equipment surfaces disinfected  Goal: Optimal Comfort and Wellbeing  Outcome: Ongoing, Progressing  Intervention: Monitor Pain and Promote Comfort  Flowsheets (Taken 4/1/2024 1619)  Pain Management Interventions:   care clustered   quiet environment facilitated   relaxation techniques promoted   position adjusted   pillow support provided  Intervention:  Provide Person-Centered Care  Flowsheets (Taken 4/1/2024 1619)  Trust Relationship/Rapport:   care explained   questions encouraged   choices provided   reassurance provided   emotional support provided   empathic listening provided   thoughts/feelings acknowledged   questions answered  Goal: Readiness for Transition of Care  Outcome: Ongoing, Progressing  Intervention: Mutually Develop Transition Plan  Flowsheets (Taken 4/1/2024 1619)  Communicated SADE with patient/caregiver: Date not available/Unable to determine     Problem: Diabetes Comorbidity  Goal: Blood Glucose Level Within Targeted Range  Outcome: Ongoing, Progressing  Intervention: Monitor and Manage Glycemia  Flowsheets (Taken 4/1/2024 1619)  Glycemic Management:   blood glucose monitored   supplemental insulin given     Problem: Skin Injury Risk Increased  Goal: Skin Health and Integrity  Outcome: Ongoing, Progressing  Intervention: Optimize Skin Protection  Flowsheets (Taken 4/1/2024 1619)  Pressure Reduction Techniques:   frequent weight shift encouraged   weight shift assistance provided   pressure points protected  Pressure Reduction Devices:   chair cushion utilized   specialty bed utilized   alternating pressure pump (ADD)   heel offloading device utilized   pressure-redistributing mattress utilized  Skin Protection:   adhesive use limited   incontinence pads utilized   tubing/devices free from skin contact  Head of Bed (HOB) Positioning: HOB at 30-45 degrees  Intervention: Promote and Optimize Oral Intake  Flowsheets (Taken 4/1/2024 1619)  Oral Nutrition Promotion:   calorie-dense foods provided   calorie-dense liquids provided   safe use of adaptive equipment encouraged     Problem: Impaired Wound Healing  Goal: Optimal Wound Healing  Outcome: Ongoing, Progressing  Intervention: Promote Wound Healing  Flowsheets (Taken 4/1/2024 1619)  Oral Nutrition Promotion:   calorie-dense foods provided   calorie-dense liquids provided   safe use of adaptive  equipment encouraged  Sleep/Rest Enhancement:   awakenings minimized   consistent schedule promoted   relaxation techniques promoted  Activity Management: Patient unable to perform activities  Pain Management Interventions:   care clustered   quiet environment facilitated   relaxation techniques promoted   position adjusted   pillow support provided     Problem: Fall Injury Risk  Goal: Absence of Fall and Fall-Related Injury  Outcome: Ongoing, Progressing  Intervention: Identify and Manage Contributors  Flowsheets (Taken 4/1/2024 1619)  Self-Care Promotion:   independence encouraged   BADL personal objects within reach   BADL personal routines maintained   meal set-up provided   safe use of adaptive equipment encouraged  Medication Review/Management:   medications reviewed   high-risk medications identified  Intervention: Promote Injury-Free Environment  Flowsheets (Taken 4/1/2024 1619)  Safety Promotion/Fall Prevention:   assistive device/personal item within reach   family to remain at bedside   side rails raised x 3     Problem: Infection  Goal: Absence of Infection Signs and Symptoms  Outcome: Ongoing, Progressing  Intervention: Prevent or Manage Infection  Flowsheets (Taken 4/1/2024 1619)  Fever Reduction/Comfort Measures:   lightweight bedding   lightweight clothing  Infection Management: aseptic technique maintained  Isolation Precautions:   precautions maintained   contact

## 2024-04-01 NOTE — PROGRESS NOTES
See assessment below.  No recommended changes in wound care regimen at this time.   All sites remain stable, healing with current regimen.     04/01/24 1126   WOCN Assessment   WOCN Total Time (mins) 75   Visit Date 04/01/24   Consult Type Follow Up   WOCN Speciality Wound   WOCN List wound vac   Wound pressure   Continence Type Fecal   Procedure wound vac   Intervention changed;assessed   Teaching on-going   Skin Interventions   Device Skin Pressure Protection absorbent pad utilized/changed;positioning supports utilized;pressure points protected;skin-to-device areas padded   Pressure Reduction Devices chair cushion utilized;specialty bed utilized;pressure-redistributing mattress utilized;alternating pressure pump (ADD);positioning supports utilized;heel offloading device utilized   Pressure Reduction Techniques weight shift assistance provided;sit time limited to 2 hours;pressure points protected;positioned off wounds;heels elevated off bed   Skin Protection hydrocolloids used;incontinence pads utilized;tubing/devices free from skin contact   Pressure Injury Prevention    Check Moisture Management Pad Done   Heel protection technique Heel boot   Check Medical Devices Done        Altered Skin Integrity 05/06/22 1140 Right Heel  Full thickness tissue loss. Subcutaneous fat may be visible but bone, tendon or muscle are not exposed   Date First Assessed/Time First Assessed: 05/06/22 1140   Altered Skin Integrity Present on Admission: yes  Side: Right  Location: Heel  Is this injury device related?: No  Primary Wound Type: (c)   Description of Altered Skin Integrity: Full thickness...   Wound Image    Dressing Appearance Dry;Intact;Clean   Drainage Amount None   Appearance Tan;Dry;Fibrin;Closed/resurfaced  (scab)   Periwound Area Dry;Intact;Scar tissue   Wound Length (cm) 0 cm   Wound Width (cm) 0 cm   Wound Depth (cm) 0 cm   Wound Volume (cm^3) 0 cm^3   Wound Surface Area (cm^2) 0 cm^2   Care Cleansed with:;Wound  cleanser   Dressing Changed;Non-adherent;Rolled gauze  (adaptic / kerlix)   Periwound Care Dry periwound area maintained   Off Loading Other (see comments)  (heel lift boot)   Dressing Change Due 04/03/24        Altered Skin Integrity 01/12/23 1530 Sacral spine Full thickness tissue loss with exposed bone, tendon, or muscle. Often includes undermining and tunneling. May extend into muscle and/or supporting structures.   Date First Assessed/Time First Assessed: 01/12/23 1530   Altered Skin Integrity Present on Admission - Did Patient arrive to the hospital with altered skin?: yes  Location: Sacral spine  Description of Altered Skin Integrity: Full thickness tissue los...   Wound Image    Description of Altered Skin Integrity Full thickness tissue loss with exposed bone, tendon, or muscle. Often includes undermining and tunneling. May extend into muscle and/or supporting structures.   Drainage Amount Moderate   Drainage Characteristics/Odor Serosanguineous;Bleeding controlled   Appearance Red;Granulating;Tan;Yellow;Fibrin;Other (see comments)  (connective tissue)   Tissue loss description Full thickness   Red (%), Wound Tissue Color 60 %   Yellow (%), Wound Tissue Color 40 %   Periwound Area Intact;Dry   Wound Edges Defined   Wound Length (cm) 8.5 cm   Wound Width (cm) 7 cm   Wound Depth (cm) 2.6 cm   Wound Volume (cm^3) 154.7 cm^3   Wound Surface Area (cm^2) 59.5 cm^2   Care Cleansed with:;Antimicrobial agent;Wound cleanser   Dressing Changed;Foam;Transparent film;Other (comment)  (black granufoam)   Periwound Care Hydrocolloid barrier applied;Skin barrier film applied  (mastisol additional adhesive)        Altered Skin Integrity 08/01/23 1534 Left posterior Ankle Full thickness tissue loss. Base is covered by slough and/or eschar in the wound bed   Date First Assessed/Time First Assessed: 08/01/23 1534   Altered Skin Integrity Present on Admission - Did Patient arrive to the hospital with altered skin?: yes  Side:  Left  Orientation: posterior  Location: Ankle  Description of Altered Skin Integri...   Wound Image    Dressing Appearance Intact;Dry;Clean   Drainage Amount None   Appearance Closed/resurfaced   Periwound Area Intact;Dry;Scar tissue   Care Cleansed with:   Dressing Changed;Rolled gauze   Periwound Care Dry periwound area maintained   Compression Other (see comments)  (heel lift boot)   Dressing Change Due 04/03/24        Altered Skin Integrity 08/01/23 1535 Left anterior Ankle Other (comment) Full thickness tissue loss. Base is covered by slough and/or eschar in the wound bed   Date First Assessed/Time First Assessed: 08/01/23 1535   Altered Skin Integrity Present on Admission - Did Patient arrive to the hospital with altered skin?: yes  Side: Left  Orientation: anterior  Location: Ankle  Is this injury device related?: No  ...   Wound Image    Dressing Appearance Intact;Dry;Clean   Drainage Amount None   Appearance Closed/resurfaced   Periwound Area Intact;Dry;Scar tissue   Care Cleansed with:;Sterile normal saline   Dressing Changed;Rolled gauze   Dressing Change Due 04/03/24        Incision/Site 08/16/23 2011 Left Hip lateral   Date First Assessed/Time First Assessed: 08/16/23 2011   Side: Left  Location: Hip  Orientation: lateral   Wound Image    Dressing Appearance Intact;Moist drainage   Drainage Amount Small   Drainage Characteristics/Odor Serous;No odor;Bleeding controlled   Appearance Red;Granulating;White;Fibrin   Red (%), Wound Tissue Color 95 %   Yellow (%), Wound Tissue Color 5 %   Periwound Area Dry;Scar tissue   Wound Edges Defined   Wound Length (cm) 2.5 cm   Wound Width (cm) 2.3 cm   Wound Depth (cm) 1.5 cm   Wound Volume (cm^3) 8.625 cm^3   Wound Surface Area (cm^2) 5.75 cm^2   Undermining (depth (cm)/location) 1 to 11 o'clock / 2cm at 3 o'clock   Care Cleansed with:;Antimicrobial agent;Wound cleanser   Dressing Sodium chloride impregnated;Gauze;Absorptive Pad   Packing packed with  (vashe  moistened mesalt)   Periwound Care Skin barrier film applied        Incision/Site 02/27/24 1450 Right Buttocks lateral   Date First Assessed/Time First Assessed: 02/27/24 1450   Side: Right  Location: Buttocks  Orientation: lateral  Additional Comments: mesalt, quick clot, gauze, abdomen pad, and tape   Wound Image    Dressing Appearance Intact;Moist drainage   Drainage Amount Moderate   Drainage Characteristics/Odor Serosanguineous;No odor;Bleeding controlled   Appearance Red;Granulating   Red (%), Wound Tissue Color 100 %   Periwound Area Intact;Scar tissue   Wound Edges Defined   Wound Length (cm) 6.5 cm   Wound Width (cm) 12.2 cm   Wound Depth (cm) 1.5 cm   Wound Volume (cm^3) 118.95 cm^3   Wound Surface Area (cm^2) 79.3 cm^2   Undermining (depth (cm)/location) 12 to 2 o'clock/ 4.5@ 1 o'clock   Care Cleansed with:;Antimicrobial agent;Wound cleanser   Dressing Changed;Foam;Transparent film  (black granufoam / drape)   Periwound Care Dry periwound area maintained;Hydrocolloid barrier applied;Skin barrier film applied  (mastisol for additional adhesive)   Dressing Change Due 04/04/24        Negative Pressure Wound Therapy  02/29/24 1227   Placement Date/Time: 02/29/24 1227   Location: Sacral Spine   NPWT Type Vacuum Therapy   Therapy Setting NPWT Continuous therapy   Pressure Setting NPWT 125 mmHg   Therapy Interventions NPWT Dressing changed;Seal intact   Sponges Inserted NPWT Black;4   Sponges Removed NPWT Black;4

## 2024-04-01 NOTE — PT/OT/SLP PROGRESS
Physical Therapy Treatment Note           Name: Antonio Galvan II    : 1965 (58 y.o.)  MRN: [id number]           TREATMENT SUMMARY AND RECOMMENDATIONS:    PT Received On: 24  PT Start Time: 1425     PT Stop Time: 1500  PT Total Time (min): 35 min     Subjective Assessment:   No complaints  Lethargic   x Awake, alert, cooperative  Uncooperative    Agitated  c/o pain    Appropriate  c/o fatigue    Confused x Treated at bedside     Emotionally labile  Treated in gym/dept.    Impulsive  Other:    Flat affect       Therapy Precautions:    Cognitive deficits  Spinal precautions    Collar - hard  Sternal precautions    Collar - soft   TLSO    Fall risk  LSO    Hip precautions - posterior  Knee immobilizer    Hip precautions - anterior  WBAT    Impaired communication  Partial weightbearing    Oxygen  TTWB    PEG tube  NWB    Visual deficits x Other: Wound Vac, super pubic cath.     Hearing deficits          Treatment Objectives:     Mobility Training:   Assist level Comments    Bed mobility Total A Proper positioning provided at completion of ROM tasks to ensure joint alignment and reduce skin break down   Transfer     Gait     Sit to stand transitions     Sitting balance     Standing balance      Wheelchair mobility     Car transfer     Other:          Therapeutic Exercise:   Exercise Sets Reps Comments   PROM to B UE and LE all joints  20 All functional planes to maintain and improve mobility to maximize positioning ability to reduce skin break down.    STM to cervical spine and PROM   10' To reduce tension and improve ROM to allow for improved use of equipment                    Additional Comments:  Pt with good tolerance, no complaints. Plans to get in WC tomorrow - ok'd 2-3 hours per MD - wounds looking good.   Pt in L side lying at tx end with pillow support as needed.     Assessment: Patient tolerated session well. Getting Blood.     PT Plan: continue per POC  Revisions made to plan of care:  No    GOALS:   Multidisciplinary Problems       Physical Therapy Goals          Problem: Physical Therapy    Goal Priority Disciplines Outcome Goal Variances Interventions   Physical Therapy Goal     PT, PT/OT Ongoing, Progressing     Description: Goals to be met by: Discharge     Pt to be seen for ROM tasks 1-2 x/day to ensure proper positioning can be obtained to overall reduce impacts of prolonged bed rest and skin break down    Progress pending healing stages of current wounds    Patient will increase functional independence with mobility by performin. Pt to tolerate PROM tasks to B UE and LE, while obtaining 25% improvement in range.   2. Sitting EOB to tolerance pending wound healing - with pt demonstrating normal BP  3. Progress to OOB into chair once able. - Met  4. Pt to tolerate up to 3 hours in power chair - Met  5. Caregivers to be able to use tess with 1-2 people.                          Skilled PT Minutes Provided: 25   Communication with Treatment Team:     Equipment recommendations:       At end of treatment, patient remained:   Up in chair     Up in wheelchair in room   x In bed    With alarm activated    Bed rails up   x Call bell in reach    x Family/friends present    Restraints secured properly    In bathroom with CNA/RN notified   x Nurse aware    In gym with therapist/tech    Other:

## 2024-04-01 NOTE — PROGRESS NOTES
[facility]  [facility] MEDICINE ~ PROGRESS NOTE  CHIEF COMPLAINT     Hospital follow up for nonhealing wound    HOSPITAL COURSE     58-year-old man with quadriplegic spinal paralysis and numerous decubitus ulcers who is followed by wound care is brought in today due to fever. He had wound cultures done 3 days ago that have grown Proteus mirabilis and Klebsiella pneumoniae. Sensitivities are not complete. The wounds were noted to have significant odor and heavy green drainage. In addition the patient had some nausea and vomiting and home health noted that his blood pressure was dipping. He was advised to come to the emergency room. Wound cultures were drawn from the right hip wound. His wife reports that when the wound is pressed above pus comes out. Both Klebsiella and Proteus maybe treated with fluoroquinolones which are both IV and oral. The patient's significant other preferred if the patient came home so that she could attend to his wounds however the patient expressed a desire to be admitted. Wound culture revealed VRE, Proteus, Pseudomonas, Klebsiella. H&H and platelets continue to decrease, suspect Zyvox induced myelosuppression. Appropriate antibiotics (Tobramycin and Zyvox) were initiated on 03/02/2024 therefore he has received 26 days of antibiotic therapy. Patient is noted to have chronic osteomyelitis therefore he would only require 2 weeks of antibiotic therapy for skin and soft tissue infection. Antibiotics discontinued on 03/28/2024 due to risk vs benefits of continuing antibiotic therapy in the setting of myelosuppression and continue with aggressive wound care. Patient did require the addition of Diltiazem for atrial flutter rate control which was subsequently changed to Coreg due to hypotension.    Today:  No reported complaints today.  Bowel movements improving.  Hemoglobin decreased.  We will give 1 of RBCs today.     OBJECTIVE/PHYSICAL EXAM     VITAL SIGNS (MOST  RECENT):  Temp: 98.1 °F (36.7 °C) (04/01/24 0752)  Pulse: 66 (04/01/24 0752)  Resp: 18 (04/01/24 0919)  BP: 113/74 (04/01/24 0752)  SpO2: 96 % (04/01/24 0730) VITAL SIGNS (24 HOUR RANGE):  Temp:  [98 °F (36.7 °C)-98.7 °F (37.1 °C)] 98.1 °F (36.7 °C)  Pulse:  [] 66  Resp:  [18] 18  SpO2:  [95 %-97 %] 96 %  BP: (113-138)/(74-92) 113/74     GENERAL: In no acute distress, afebrile  HEENT:  PERRLA  CHEST: Clear to auscultation bilaterally  HEART: S1, S2, no appreciable murmur  ABDOMEN: Soft, nontender, BS +  MSK: Warm, no lower extremity edema, no clubbing or cyanosis  NEUROLOGIC: Alert and oriented x4  INTEGUMENTARY:  See media for wounds    ASSESSMENT/PLAN     Nonhealing chronic wound   VRE, Proteus, Pseudomonas, Klebsiella   Chronic osteomyelitis   S/p debridement with Dr. Jean on 02/27/2024  Zyvox and Tobramycin 03/02/2024-03/28/2024  History of chronic osteomyelitis - only needs 2 weeks of antibiotic therapy for acute skin and soft tissue infection  Discontinue antibiotics 03/28/2024 due to risk vs benefits of continuing antibiotic therapy in the setting of myelosuppression and continue with aggressive wound care  Wound VAC and wound care  Appreciate dietary recommendations for supplements     Atrial flutter  Hypertension/Hypotension   Diltiazem started 3/16/2024-03/28/2024 and required multiple adjustments due to hypotension and tachycardia changed to Coreg  No improvement with Coreg, diltiazem resumed once again 3/31  No AC 2/2 patient request as he has has prior episodes of acute blood loss from wounds - aspirin d/c 02/28/2024  Hypertensive episodes are associated with pain    Post op anemia   Thrombocytopenia   Myelosuppression 2/2 Zyvox   H&H improved s/p 2u pRBC 02/28/2024 and 1u pRBC on 02/29/2024  No anticoagulation at this time - aspirin d/c 02/28/2024  Peripheral smear without significant findings   1u pRBC today for decreasing hemoglobin      Hyperglycemia/hypoglycemia in setting of DM  A1c  02/23/2024 8.5%  Reports taking 80u Levemir BID  Continue home Sitagliptin  Continue titrating insulin regimen as needed    GISSEL - improving   Hyperkalemia - resolved   Renal function improving  24 hour urine potassium shows adequate excretion    Left upper extremity swelling - resolved   Ultrasound without DVT     Hx of: Neurogenic bladder, Quadriplegia  Will need to change suprapubic catheter soon per patient     DVT prophylaxis:  SCDs, history of bleeding in the recent past    Anticipated discharge and disposition: home with C, discharge planning to be discussed Wednesday   __________________________________________________________________________    LABS/MICRO/MEDS/DIAGNOSTICS     LABS  Recent Labs     04/01/24  0530      K 5.0   CHLORIDE 111*   CO2 22   BUN 32.3*   CREATININE 1.28*   GLUCOSE 118*   CALCIUM 8.4     Recent Labs     04/01/24  0530   WBC 4.19*   RBC 2.68*   HCT 23.3*   MCV 86.9   PLT 66*     MICROBIOLOGY  Microbiology Results (last 7 days)       ** No results found for the last 168 hours. **             MEDICATIONS   acetaminophen  650 mg Oral QHS    ascorbic acid (vitamin C)  500 mg Oral Daily    cetirizine  10 mg Oral Daily    collagenase   Topical (Top) Daily    diltiaZEM  30 mg Oral Q6H    ferrous sulfate  1 tablet Oral Daily    fluconazole  200 mg Oral Weekly    insulin detemir U-100  15 Units Subcutaneous QHS    loperamide  4 mg Oral Daily    miconazole   Topical (Top) BID    pantoprazole  40 mg Oral Daily    SITagliptin phosphate  100 mg Oral Daily    sodium chloride 0.9%  10 mL Intravenous Q6H         INFUSIONS       DIAGNOSTIC TESTS  US Upper Extremity Veins Left   Final Result      No venous thrombosis identified.         Electronically signed by: Andrade Perez   Date:    03/18/2024   Time:    21:08      X-Ray Chest 1 View   Final Result      Left PICC tip overlies the mid SVC.         Electronically signed by: Oj Solomon   Date:    02/28/2024   Time:    14:29         EF   Date  Value Ref Range Status   05/09/2023 60 % Final   07/18/2022 40 % Final        NUTRITION STATUS  Patient meets ASPEN criteria for   malnutrition of   per RD assessment as evidenced by:                       A minimum of two characteristics is recommended for diagnosis of either severe or non-severe malnutrition.     Case related differential diagnoses have been reviewed; assessment and plan has been documented. I have personally reviewed the labs and test results that are currently available; I have reviewed the patients medication list. I have reviewed the consulting providers recommendations. I have reviewed or attempted to review medical records based upon their availability.  All of the patient's and/or family's questions have been addressed and answered to the best of my ability.  I will continue to monitor closely and make adjustments to medical management as needed.  This document was created using M*Modal Fluency Direct.  Transcription errors may have been made.  Please contact me if any questions may rise regarding documentation to clarify transcription.      ANTHONY Vazquez   Internal Medicine  Department of Hospital Medicine [facility]

## 2024-04-02 LAB
ANION GAP SERPL CALC-SCNC: 8 MEQ/L
BASOPHILS # BLD AUTO: 0.01 X10(3)/MCL
BASOPHILS NFR BLD AUTO: 0.2 %
BUN SERPL-MCNC: 29.5 MG/DL (ref 8.4–25.7)
CALCIUM SERPL-MCNC: 8.2 MG/DL (ref 8.4–10.2)
CHLORIDE SERPL-SCNC: 110 MMOL/L (ref 98–107)
CO2 SERPL-SCNC: 22 MMOL/L (ref 22–29)
CREAT SERPL-MCNC: 1.36 MG/DL (ref 0.73–1.18)
CREAT/UREA NIT SERPL: 22
EOSINOPHIL # BLD AUTO: 0.15 X10(3)/MCL (ref 0–0.9)
EOSINOPHIL NFR BLD AUTO: 3.3 %
ERYTHROCYTE [DISTWIDTH] IN BLOOD BY AUTOMATED COUNT: 17.8 % (ref 11.5–17)
GFR SERPLBLD CREATININE-BSD FMLA CKD-EPI: >60 MLS/MIN/1.73/M2
GLUCOSE SERPL-MCNC: 142 MG/DL (ref 74–100)
HCT VFR BLD AUTO: 25.3 % (ref 42–52)
HGB BLD-MCNC: 8 G/DL (ref 14–18)
IMM GRANULOCYTES # BLD AUTO: 0.01 X10(3)/MCL (ref 0–0.04)
IMM GRANULOCYTES NFR BLD AUTO: 0.2 %
LYMPHOCYTES # BLD AUTO: 1.59 X10(3)/MCL (ref 0.6–4.6)
LYMPHOCYTES NFR BLD AUTO: 34.9 %
MCH RBC QN AUTO: 26.9 PG (ref 27–31)
MCHC RBC AUTO-ENTMCNC: 31.6 G/DL (ref 33–36)
MCV RBC AUTO: 85.2 FL (ref 80–94)
MONOCYTES # BLD AUTO: 0.57 X10(3)/MCL (ref 0.1–1.3)
MONOCYTES NFR BLD AUTO: 12.5 %
NEUTROPHILS # BLD AUTO: 2.23 X10(3)/MCL (ref 2.1–9.2)
NEUTROPHILS NFR BLD AUTO: 48.9 %
PLATELET # BLD AUTO: 69 X10(3)/MCL (ref 130–400)
PMV BLD AUTO: 10.4 FL (ref 7.4–10.4)
POCT GLUCOSE: 136 MG/DL (ref 70–110)
POCT GLUCOSE: 176 MG/DL (ref 70–110)
POCT GLUCOSE: 188 MG/DL (ref 70–110)
POCT GLUCOSE: 215 MG/DL (ref 70–110)
POTASSIUM SERPL-SCNC: 4.9 MMOL/L (ref 3.5–5.1)
RBC # BLD AUTO: 2.97 X10(6)/MCL (ref 4.7–6.1)
SODIUM SERPL-SCNC: 140 MMOL/L (ref 136–145)
WBC # SPEC AUTO: 4.56 X10(3)/MCL (ref 4.5–11.5)

## 2024-04-02 PROCEDURE — 94760 N-INVAS EAR/PLS OXIMETRY 1: CPT

## 2024-04-02 PROCEDURE — 27000207 HC ISOLATION

## 2024-04-02 PROCEDURE — 85025 COMPLETE CBC W/AUTO DIFF WBC: CPT | Performed by: PHYSICIAN ASSISTANT

## 2024-04-02 PROCEDURE — 80048 BASIC METABOLIC PNL TOTAL CA: CPT | Performed by: PHYSICIAN ASSISTANT

## 2024-04-02 PROCEDURE — 99900035 HC TECH TIME PER 15 MIN (STAT)

## 2024-04-02 PROCEDURE — 25000003 PHARM REV CODE 250: Performed by: STUDENT IN AN ORGANIZED HEALTH CARE EDUCATION/TRAINING PROGRAM

## 2024-04-02 PROCEDURE — 11000004 HC SNF PRIVATE

## 2024-04-02 PROCEDURE — A4216 STERILE WATER/SALINE, 10 ML: HCPCS | Performed by: INTERNAL MEDICINE

## 2024-04-02 PROCEDURE — 25000003 PHARM REV CODE 250: Performed by: INTERNAL MEDICINE

## 2024-04-02 PROCEDURE — 63600175 PHARM REV CODE 636 W HCPCS: Performed by: INTERNAL MEDICINE

## 2024-04-02 PROCEDURE — 97530 THERAPEUTIC ACTIVITIES: CPT

## 2024-04-02 PROCEDURE — 25000003 PHARM REV CODE 250: Performed by: PHYSICIAN ASSISTANT

## 2024-04-02 PROCEDURE — 63600175 PHARM REV CODE 636 W HCPCS: Performed by: STUDENT IN AN ORGANIZED HEALTH CARE EDUCATION/TRAINING PROGRAM

## 2024-04-02 RX ORDER — DILTIAZEM HYDROCHLORIDE 120 MG/1
120 CAPSULE, COATED, EXTENDED RELEASE ORAL DAILY
Status: DISCONTINUED | OUTPATIENT
Start: 2024-04-02 | End: 2024-04-09 | Stop reason: HOSPADM

## 2024-04-02 RX ADMIN — MICONAZOLE NITRATE: 20 CREAM TOPICAL at 09:04

## 2024-04-02 RX ADMIN — OXYCODONE HYDROCHLORIDE AND ACETAMINOPHEN 500 MG: 500 TABLET ORAL at 09:04

## 2024-04-02 RX ADMIN — INSULIN DETEMIR 15 UNITS: 100 INJECTION, SOLUTION SUBCUTANEOUS at 09:04

## 2024-04-02 RX ADMIN — SODIUM CHLORIDE, PRESERVATIVE FREE 10 ML: 5 INJECTION INTRAVENOUS at 05:04

## 2024-04-02 RX ADMIN — FERROUS SULFATE TAB 325 MG (65 MG ELEMENTAL FE) 1 EACH: 325 (65 FE) TAB at 09:04

## 2024-04-02 RX ADMIN — ACETAMINOPHEN 650 MG: 325 TABLET ORAL at 09:04

## 2024-04-02 RX ADMIN — LOPERAMIDE HYDROCHLORIDE 4 MG: 2 CAPSULE ORAL at 09:04

## 2024-04-02 RX ADMIN — SODIUM CHLORIDE, PRESERVATIVE FREE 10 ML: 5 INJECTION INTRAVENOUS at 06:04

## 2024-04-02 RX ADMIN — DILTIAZEM HYDROCHLORIDE 30 MG: 30 TABLET, FILM COATED ORAL at 12:04

## 2024-04-02 RX ADMIN — INSULIN ASPART 2 UNITS: 100 INJECTION, SOLUTION INTRAVENOUS; SUBCUTANEOUS at 09:04

## 2024-04-02 RX ADMIN — SODIUM CHLORIDE, PRESERVATIVE FREE 10 ML: 5 INJECTION INTRAVENOUS at 12:04

## 2024-04-02 RX ADMIN — CETIRIZINE HYDROCHLORIDE 10 MG: 10 TABLET, FILM COATED ORAL at 09:04

## 2024-04-02 RX ADMIN — DILTIAZEM HYDROCHLORIDE 120 MG: 120 CAPSULE, COATED, EXTENDED RELEASE ORAL at 03:04

## 2024-04-02 RX ADMIN — SITAGLIPTIN 100 MG: 50 TABLET, FILM COATED ORAL at 09:04

## 2024-04-02 RX ADMIN — COLLAGENASE SANTYL: 250 OINTMENT TOPICAL at 09:04

## 2024-04-02 RX ADMIN — SODIUM CHLORIDE, PRESERVATIVE FREE 10 ML: 5 INJECTION INTRAVENOUS at 01:04

## 2024-04-02 RX ADMIN — PANTOPRAZOLE SODIUM 40 MG: 40 TABLET, DELAYED RELEASE ORAL at 09:04

## 2024-04-02 RX ADMIN — DILTIAZEM HYDROCHLORIDE 30 MG: 30 TABLET, FILM COATED ORAL at 05:04

## 2024-04-02 NOTE — PLAN OF CARE
[facility]  Discharge Final Note    Primary Care Provider: Jennifer Fernando NP    Expected Discharge Date: 2/26/2024    Final Discharge Note (most recent)       Final Note - 04/01/24 1936          Final Note    Assessment Type Final Discharge Note     Anticipated Discharge Disposition Swing Bed     What phone number can be called within the next 1-3 days to see how you are doing after discharge? [phone]     Hospital Resources/Appts/Education Provided Provided patient/caregiver with written discharge plan information        Post-Acute Status    Discharge Delays None known at this time                     Important Message from Medicare

## 2024-04-02 NOTE — PLAN OF CARE
Problem: Adult Inpatient Plan of Care  Goal: Plan of Care Review  Outcome: Ongoing, Progressing  Flowsheets (Taken 4/1/2024 2354)  Plan of Care Reviewed With: patient  Goal: Patient-Specific Goal (Individualized)  Outcome: Ongoing, Progressing  Flowsheets (Taken 4/1/2024 2354)  Anxieties, Fears or Concerns: none  Individualized Care Needs: monitor CBGS, safety, moinitor BP\HR  Goal: Absence of Hospital-Acquired Illness or Injury  Outcome: Ongoing, Progressing  Intervention: Identify and Manage Fall Risk  Flowsheets (Taken 4/1/2024 2354)  Safety Promotion/Fall Prevention:   assistive device/personal item within reach   family to remain at bedside   room near unit station   side rails raised x 3  Intervention: Prevent Skin Injury  Flowsheets (Taken 4/1/2024 2354)  Body Position: supine  Skin Protection: adhesive use limited  Intervention: Prevent and Manage VTE (Venous Thromboembolism) Risk  Flowsheets (Taken 4/1/2024 2354)  Activity Management: Rolling - L1  VTE Prevention/Management: fluids promoted  Range of Motion: ROM (range of motion) performed  Intervention: Prevent Infection  Flowsheets (Taken 4/1/2024 2354)  Infection Prevention:   cohorting utilized   personal protective equipment utilized   rest/sleep promoted  Goal: Optimal Comfort and Wellbeing  Outcome: Ongoing, Progressing  Intervention: Monitor Pain and Promote Comfort  Flowsheets (Taken 4/1/2024 2354)  Pain Management Interventions: care clustered  Intervention: Provide Person-Centered Care  Flowsheets (Taken 4/1/2024 2354)  Trust Relationship/Rapport:   care explained   questions encouraged   choices provided   reassurance provided   emotional support provided   empathic listening provided   thoughts/feelings acknowledged  Goal: Readiness for Transition of Care  Outcome: Ongoing, Progressing  Intervention: Mutually Develop Transition Plan  Flowsheets (Taken 4/1/2024 2354)  Equipment Currently Used at Home:   hospital bed   lift device   power  chair  Transportation Anticipated: family or friend will provide  Who are your caregiver(s) and their phone number(s)?: Judy (mother) [phone]  Communicated SADE with patient/caregiver: Date not available/Unable to determine  Do you expect to return to your current living situation?: Yes  Readmission within 30 days?: No  Do you currently have service(s) that help you manage your care at home?: No  Is the pt/caregiver preference to resume services with current agency: No     Problem: Diabetes Comorbidity  Goal: Blood Glucose Level Within Targeted Range  Outcome: Ongoing, Progressing  Intervention: Monitor and Manage Glycemia  Flowsheets (Taken 4/1/2024 2354)  Glycemic Management:   blood glucose monitored   supplemental insulin given     Problem: Skin Injury Risk Increased  Goal: Skin Health and Integrity  Outcome: Ongoing, Progressing  Intervention: Optimize Skin Protection  Flowsheets (Taken 4/1/2024 2354)  Pressure Reduction Techniques: frequent weight shift encouraged  Pressure Reduction Devices: positioning supports utilized  Skin Protection: adhesive use limited  Head of Bed (HOB) Positioning: HOB at 20-30 degrees  Intervention: Promote and Optimize Oral Intake  Flowsheets (Taken 4/1/2024 2354)  Oral Nutrition Promotion:   calorie-dense foods provided   calorie-dense liquids provided     Problem: Impaired Wound Healing  Goal: Optimal Wound Healing  Outcome: Ongoing, Progressing  Intervention: Promote Wound Healing  Flowsheets (Taken 4/1/2024 2354)  Oral Nutrition Promotion:   calorie-dense foods provided   calorie-dense liquids provided  Sleep/Rest Enhancement:   awakenings minimized   relaxation techniques promoted  Activity Management: Rolling - L1  Pain Management Interventions: care clustered     Problem: Fall Injury Risk  Goal: Absence of Fall and Fall-Related Injury  Outcome: Ongoing, Progressing  Intervention: Identify and Manage Contributors  Flowsheets (Taken 4/1/2024 2354)  Self-Care Promotion:    independence encouraged   BADL personal objects within reach   BADL personal routines maintained   safe use of adaptive equipment encouraged  Medication Review/Management: medications reviewed  Intervention: Promote Injury-Free Environment  Flowsheets (Taken 4/1/2024 2355)  Safety Promotion/Fall Prevention:   assistive device/personal item within reach   family to remain at bedside   room near unit station   side rails raised x 3     Problem: Infection  Goal: Absence of Infection Signs and Symptoms  Outcome: Ongoing, Progressing  Intervention: Prevent or Manage Infection  Flowsheets (Taken 4/1/2024 8242)  Fever Reduction/Comfort Measures:   lightweight bedding   lightweight clothing  Infection Management: aseptic technique maintained  Isolation Precautions:   precautions maintained   contact

## 2024-04-02 NOTE — PT/OT/SLP PROGRESS
Physical Therapy Treatment Note           Name: Antonio Galvan II    : 1965 (58 y.o.)  MRN: [id number]           TREATMENT SUMMARY AND RECOMMENDATIONS:    PT Received On: 24  PT Start Time: 1118     PT Stop Time: 1145  PT Total Time (min): 27 min     Subjective Assessment:   No complaints  Lethargic   x Awake, alert, cooperative  Uncooperative    Agitated  c/o pain    Appropriate  c/o fatigue    Confused x Treated at bedside     Emotionally labile  Treated in gym/dept.    Impulsive  Other:    Flat affect       Therapy Precautions:    Cognitive deficits  Spinal precautions    Collar - hard  Sternal precautions    Collar - soft   TLSO    Fall risk  LSO    Hip precautions - posterior  Knee immobilizer    Hip precautions - anterior  WBAT    Impaired communication  Partial weightbearing    Oxygen  TTWB    PEG tube  NWB    Visual deficits x Other: Wound Vac and Super pubic cath    Hearing deficits  x OK 'd to get in chair 2-3 hours per day       Treatment Objectives:     Mobility Training:   Assist level Comments    Bed mobility Total A Rolling side to side for tess placement and lower body dressing    Transfer Total A Tess from Bed>Chair; x 2-3 person assistance for proper positioning in chair - Due to 0/5 mm activation 1 person has to guide/protect feet, one maintains wound vac and catheter and one guides the tess.    Gait     Sit to stand transitions     Sitting balance     Standing balance      Wheelchair mobility     Car transfer     Other:          Therapeutic Exercise:   Exercise Sets Reps Comments                               Additional Comments:  Extra time needed for proper positioning in WC once OOB. Nursing in room. Mom present today asking to observe tess lift and assisted with task.     Will continue caregiver training on tess; trying to reduce from 3 person assistance    Assessment: Patient tolerated session well - up in chair and outside.     PT Plan: Continue per  POC  Revisions made to plan of care: no     GOALS:   Multidisciplinary Problems       Physical Therapy Goals          Problem: Physical Therapy    Goal Priority Disciplines Outcome Goal Variances Interventions   Physical Therapy Goal     PT, PT/OT Ongoing, Progressing     Description: Goals to be met by: Discharge     Pt to be seen for ROM tasks 1-2 x/day to ensure proper positioning can be obtained to overall reduce impacts of prolonged bed rest and skin break down    Progress pending healing stages of current wounds    Patient will increase functional independence with mobility by performin. Pt to tolerate PROM tasks to B UE and LE, while obtaining 25% improvement in range.   2. Sitting EOB to tolerance pending wound healing - with pt demonstrating normal BP  3. Progress to OOB into chair once able. - Met  4. Pt to tolerate up to 3 hours in power chair - Met  5. Caregivers to be able to use tess with 1-2 people.                          Skilled PT Minutes Provided: 25   Communication with Treatment Team:     Equipment recommendations:       At end of treatment, patient remained:   Up in chair    x Up in wheelchair in room    In bed    With alarm activated    Bed rails up    Call bell in reach    x Family/friends present    Restraints secured properly    In bathroom with CNA/RN notified   x Nurse aware    In gym with therapist/tech    Other:

## 2024-04-02 NOTE — PLAN OF CARE
Problem: Adult Inpatient Plan of Care  Goal: Plan of Care Review  Outcome: Ongoing, Progressing  Flowsheets (Taken 4/2/2024 1300)  Plan of Care Reviewed With: patient  Goal: Patient-Specific Goal (Individualized)  Outcome: Ongoing, Progressing  Flowsheets (Taken 4/2/2024 1300)  Anxieties, Fears or Concerns: none expressed at this time  Individualized Care Needs: monitor CBGs, safety maintained, monitor BP/ HR  Goal: Absence of Hospital-Acquired Illness or Injury  Outcome: Ongoing, Progressing  Intervention: Identify and Manage Fall Risk  Flowsheets (Taken 4/2/2024 1300)  Safety Promotion/Fall Prevention:   assistive device/personal item within reach   family to remain at bedside  Intervention: Prevent Skin Injury  Flowsheets (Taken 4/2/2024 1300)  Body Position: position maintained  Skin Protection:   adhesive use limited   incontinence pads utilized   tubing/devices free from skin contact  Intervention: Prevent and Manage VTE (Venous Thromboembolism) Risk  Flowsheets (Taken 4/2/2024 1300)  Activity Management: Up in chair - L3  VTE Prevention/Management:   ambulation promoted   bleeding precations maintained   bleeding risk assessed   fluids promoted  Range of Motion: active ROM (range of motion) encouraged  Intervention: Prevent Infection  Flowsheets (Taken 4/2/2024 1300)  Infection Prevention:   cohorting utilized   hand hygiene promoted   rest/sleep promoted   single patient room provided  Goal: Optimal Comfort and Wellbeing  Outcome: Ongoing, Progressing  Intervention: Monitor Pain and Promote Comfort  Flowsheets (Taken 4/2/2024 1300)  Pain Management Interventions:   care clustered   pillow support provided   position adjusted   relaxation techniques promoted   quiet environment facilitated  Intervention: Provide Person-Centered Care  Flowsheets (Taken 4/2/2024 1300)  Trust Relationship/Rapport:   care explained   questions encouraged   choices provided   reassurance provided   emotional support provided    thoughts/feelings acknowledged   questions answered   empathic listening provided  Goal: Readiness for Transition of Care  Outcome: Ongoing, Progressing  Intervention: Mutually Develop Transition Plan  Flowsheets (Taken 4/2/2024 1300)  Communicated SADE with patient/caregiver: Date not available/Unable to determine     Problem: Diabetes Comorbidity  Goal: Blood Glucose Level Within Targeted Range  Outcome: Ongoing, Progressing  Intervention: Monitor and Manage Glycemia  Flowsheets (Taken 4/2/2024 1300)  Glycemic Management:   blood glucose monitored   supplemental insulin given     Problem: Skin Injury Risk Increased  Goal: Skin Health and Integrity  Outcome: Ongoing, Progressing  Intervention: Optimize Skin Protection  Flowsheets (Taken 4/2/2024 1300)  Pressure Reduction Techniques:   frequent weight shift encouraged   weight shift assistance provided   heels elevated off bed   positioned off wounds   pressure points protected  Pressure Reduction Devices:   positioning supports utilized   heel offloading device utilized  Skin Protection:   adhesive use limited   incontinence pads utilized   tubing/devices free from skin contact  Head of Bed (HOB) Positioning: HOB at 30-45 degrees  Intervention: Promote and Optimize Oral Intake  Flowsheets (Taken 4/2/2024 1300)  Oral Nutrition Promotion:   calorie-dense foods provided   safe use of adaptive equipment encouraged   calorie-dense liquids provided     Problem: Impaired Wound Healing  Goal: Optimal Wound Healing  Outcome: Ongoing, Progressing  Intervention: Promote Wound Healing  Flowsheets (Taken 4/2/2024 1300)  Oral Nutrition Promotion:   calorie-dense foods provided   safe use of adaptive equipment encouraged   calorie-dense liquids provided  Sleep/Rest Enhancement:   awakenings minimized   relaxation techniques promoted  Activity Management: Up in chair - L3  Pain Management Interventions:   care clustered   pillow support provided   position adjusted   relaxation  techniques promoted   quiet environment facilitated     Problem: Fall Injury Risk  Goal: Absence of Fall and Fall-Related Injury  Outcome: Ongoing, Progressing  Intervention: Identify and Manage Contributors  Flowsheets (Taken 4/2/2024 1300)  Self-Care Promotion:   independence encouraged   BADL personal objects within reach   BADL personal routines maintained   meal set-up provided   safe use of adaptive equipment encouraged  Medication Review/Management:   medications reviewed   high-risk medications identified  Intervention: Promote Injury-Free Environment  Flowsheets (Taken 4/2/2024 1300)  Safety Promotion/Fall Prevention:   assistive device/personal item within reach   family to remain at bedside     Problem: Infection  Goal: Absence of Infection Signs and Symptoms  Outcome: Ongoing, Progressing  Intervention: Prevent or Manage Infection  Flowsheets (Taken 4/2/2024 1300)  Fever Reduction/Comfort Measures:   lightweight bedding   lightweight clothing  Infection Management: aseptic technique maintained  Isolation Precautions:   precautions maintained   contact

## 2024-04-02 NOTE — PT/OT/SLP PROGRESS
Physical Therapy Treatment Note           Name: Antonio Galvan II    : 1965 (58 y.o.)  MRN: [id number]           TREATMENT SUMMARY AND RECOMMENDATIONS:    PT Received On: 24  PT Start Time: 1450     PT Stop Time: 1515  PT Total Time (min): 25 min     Subjective Assessment:   No complaints  Lethargic   x Awake, alert, cooperative  Uncooperative    Agitated  c/o pain    Appropriate  c/o fatigue    Confused x Treated at bedside     Emotionally labile  Treated in gym/dept.    Impulsive  Other:    Flat affect       Therapy Precautions:    Cognitive deficits  Spinal precautions    Collar - hard  Sternal precautions    Collar - soft   TLSO    Fall risk  LSO    Hip precautions - posterior  Knee immobilizer    Hip precautions - anterior  WBAT    Impaired communication  Partial weightbearing    Oxygen  TTWB    PEG tube  NWB    Visual deficits x Other: Wound Vac and Super pubic cath    Hearing deficits  x OK 'd to get in chair 2-3 hours per day       Treatment Objectives:     Mobility Training:   Assist level Comments    Bed mobility Total A Rolling side to side for tess pad removal and doffing pants. Proper bed positioning completed with pt in supine using pillow for UE and LE to reduce skin break down.    Transfer Total A Tess from chair>bed; x 2-3 person assistance for maintenance of cath, wound vac and B LE   Gait     Sit to stand transitions     Sitting balance     Standing balance      Wheelchair mobility     Car transfer              Therapeutic Exercise:   Exercise Sets Reps Comments                               Additional Comments:  Extra time needed for proper positioning once back in bed.     Will continue caregiver training on tess and safety with slide board (this is what they were doing at home)    PT had a long conversation with wound care, they do not feel slide board would be safe for his wounds and they do not recommend using pool ring in seat like he is currently using for  cushion. Wound care provided recommended cushion.     Pt states they do not want a tess    PT is recommending a WC re-eval for better cushion recommendations and fit of WC to best protect current wounds     PT to continue educating     Assessment: Patient tolerated session well , but still needing 2-3 person assistance for positioning during tess.     PT Plan: Continue per POC  Revisions made to plan of care: No    GOALS:   Multidisciplinary Problems       Physical Therapy Goals          Problem: Physical Therapy    Goal Priority Disciplines Outcome Goal Variances Interventions   Physical Therapy Goal     PT, PT/OT Ongoing, Progressing     Description: Goals to be met by: Discharge     Pt to be seen for ROM tasks 1-2 x/day to ensure proper positioning can be obtained to overall reduce impacts of prolonged bed rest and skin break down    Progress pending healing stages of current wounds    Patient will increase functional independence with mobility by performin. Pt to tolerate PROM tasks to B UE and LE, while obtaining 25% improvement in range.   2. Sitting EOB to tolerance pending wound healing - with pt demonstrating normal BP  3. Progress to OOB into chair once able. - Met  4. Pt to tolerate up to 3 hours in power chair - Met  5. Caregivers to be able to use tess with 1-2 people.                          Skilled PT Minutes Provided: 25   Communication with Treatment Team:     Equipment recommendations:       At end of treatment, patient remained:   Up in chair     Up in wheelchair in room   x In bed    With alarm activated    Bed rails up   x Call bell in reach    x Family/friends present    Restraints secured properly    In bathroom with CNA/RN notified   x Nurse aware    In gym with therapist/tech    Other:

## 2024-04-02 NOTE — PROGRESS NOTES
[facility]  [facility] MEDICINE ~ PROGRESS NOTE  CHIEF COMPLAINT     Hospital follow up for nonhealing wound    HOSPITAL COURSE     58-year-old man with quadriplegic spinal paralysis and numerous decubitus ulcers who is followed by wound care is brought in today due to fever. He had wound cultures done 3 days ago that have grown Proteus mirabilis and Klebsiella pneumoniae. Sensitivities are not complete. The wounds were noted to have significant odor and heavy green drainage. In addition the patient had some nausea and vomiting and home health noted that his blood pressure was dipping. He was advised to come to the emergency room. Wound cultures were drawn from the right hip wound. His significant other reports that when the wound is pressed above pus comes out. Both Klebsiella and Proteus maybe treated with fluoroquinolones which are both IV and oral. The patient's significant other preferred if the patient came home so that she could attend to his wounds however the patient expressed a desire to be admitted.  Patient did undergo a debridement with Dr. Ruvalcaba [id number].  He required 2u pRBC 02/28/2024 and 1u pRBC on 02/29/2024 with improvement of hemoglobin. Wound culture revealed VRE, Proteus, Pseudomonas, Klebsiella. H&H and platelets continue to decrease, suspect Zyvox induced myelosuppression. Appropriate antibiotics (Tobramycin and Zyvox) were initiated on 03/02/2024 therefore he has received 26 days of antibiotic therapy. Patient is noted to have chronic osteomyelitis therefore he would only require 2 weeks of antibiotic therapy for skin and soft tissue infection. Antibiotics discontinued on 03/28/2024 due to risk vs benefits of continuing antibiotic therapy in the setting of myelosuppression and continue with aggressive wound care. Patient did require the addition of Diltiazem for atrial flutter rate control which was subsequently changed to Coreg due to hypotension.  Patient  did not tolerate correct well due to continued increased heart rate therefore he was transitioned back to diltiazem.  Patient did have to receive another unit of PRBCs on 04/01/2024 due to decreasing hemoglobin and continued tachycardia.    Today:  Heart rate and blood count improved today. Mother reports patient's bowel habits have returned to baseline. Working with case management to obtain home wound vac and possible discharge home later this week with continued outpatient wound care.    OBJECTIVE/PHYSICAL EXAM     VITAL SIGNS (MOST RECENT):  Temp: 99.1 °F (37.3 °C) (04/02/24 0910)  Pulse: 66 (04/02/24 0910)  Resp: 18 (04/02/24 0829)  BP: 110/76 (04/02/24 0910)  SpO2: 96 % (04/02/24 0910) VITAL SIGNS (24 HOUR RANGE):  Temp:  [97.3 °F (36.3 °C)-99.1 °F (37.3 °C)] 99.1 °F (37.3 °C)  Pulse:  [] 66  Resp:  [18] 18  SpO2:  [95 %-99 %] 96 %  BP: ()/(65-97) 110/76     GENERAL: In no acute distress, afebrile  HEENT:  PERRLA  CHEST: Clear to auscultation bilaterally  HEART: S1, S2, no appreciable murmur  ABDOMEN: Soft, nontender, BS +  MSK: Warm, no lower extremity edema, no clubbing or cyanosis  NEUROLOGIC: Alert and oriented x4  INTEGUMENTARY:  See media for wounds    ASSESSMENT/PLAN     Nonhealing chronic wound   VRE, Proteus, Pseudomonas, Klebsiella   Chronic osteomyelitis   S/p debridement with Dr. Jean on 02/27/2024  Zyvox and Tobramycin 03/02/2024-03/28/2024  History of chronic osteomyelitis - only needs 2 weeks of antibiotic therapy for acute skin and soft tissue infection  Discontinue antibiotics 03/28/2024 due to risk vs benefits of continuing antibiotic therapy in the setting of myelosuppression and continue with aggressive wound care  Wound VAC and wound care - ordering home wound vac   Appreciate dietary recommendations for supplements     Atrial flutter  Hypertension/Hypotension   Diltiazem started 3/16/2024-03/28/2024 and required multiple adjustments due to hypotension and tachycardia  changed to Coreg  No improvement with Coreg, diltiazem resumed once again 3/31  No AC 2/2 patient request as he has has prior episodes of acute blood loss from wounds - aspirin d/c 02/28/2024  HR improved after blood transfusion yesterday  Hypertensive episodes are associated with pain    Post op anemia   Thrombocytopenia   Myelosuppression 2/2 Zyvox   H&H improved s/p 2u pRBC 02/28/2024, 1u pRBC on 02/29/2024, 1u pRBC on 04/01/2024  No anticoagulation at this time - aspirin d/c 02/28/2024  Peripheral smear without significant findings      Hyperglycemia/hypoglycemia in setting of DM  A1c 02/23/2024 8.5%  Reports taking 80u Levemir BID  Continue home Sitagliptin  Continue titrating insulin regimen as needed    GISSEL - improving   Hyperkalemia - resolved   24 hour urine potassium shows adequate excretion    Left upper extremity swelling - resolved   Ultrasound without DVT     Hx of: Neurogenic bladder, Quadriplegia  Changed suprapubic catheter 03/31/2024     DVT prophylaxis:  SCDs, history of bleeding in the recent past    Anticipated discharge and disposition: home with [facility], awaiting home wound VAC approval __________________________________________________________________________    LABS/MICRO/MEDS/DIAGNOSTICS     LABS  Recent Labs     04/02/24  0535      K 4.9   CHLORIDE 110*   CO2 22   BUN 29.5*   CREATININE 1.36*   GLUCOSE 142*   CALCIUM 8.2*     Recent Labs     04/02/24  0535   WBC 4.56   RBC 2.97*   HCT 25.3*   MCV 85.2   PLT 69*     MICROBIOLOGY  Microbiology Results (last 7 days)       ** No results found for the last 168 hours. **             MEDICATIONS   acetaminophen  650 mg Oral QHS    ascorbic acid (vitamin C)  500 mg Oral Daily    cetirizine  10 mg Oral Daily    collagenase   Topical (Top) Daily    diltiaZEM  30 mg Oral Q6H    ferrous sulfate  1 tablet Oral Daily    fluconazole  200 mg Oral Weekly    insulin detemir U-100  15 Units Subcutaneous QHS    loperamide  4 mg Oral Daily    miconazole    Topical (Top) BID    pantoprazole  40 mg Oral Daily    SITagliptin phosphate  100 mg Oral Daily    sodium chloride 0.9%  10 mL Intravenous Q6H         INFUSIONS       DIAGNOSTIC TESTS  US Upper Extremity Veins Left   Final Result      No venous thrombosis identified.         Electronically signed by: Andrade Perez   Date:    03/18/2024   Time:    21:08      X-Ray Chest 1 View   Final Result      Left PICC tip overlies the mid SVC.         Electronically signed by: Oj Solomon   Date:    02/28/2024   Time:    14:29         EF   Date Value Ref Range Status   05/09/2023 60 % Final   07/18/2022 40 % Final        NUTRITION STATUS  Patient meets ASPEN criteria for   malnutrition of   per RD assessment as evidenced by:                       A minimum of two characteristics is recommended for diagnosis of either severe or non-severe malnutrition.     Case related differential diagnoses have been reviewed; assessment and plan has been documented. I have personally reviewed the labs and test results that are currently available; I have reviewed the patients medication list. I have reviewed the consulting providers recommendations. I have reviewed or attempted to review medical records based upon their availability.  All of the patient's and/or family's questions have been addressed and answered to the best of my ability.  I will continue to monitor closely and make adjustments to medical management as needed.  This document was created using M*Modal Fluency Direct.  Transcription errors may have been made.  Please contact me if any questions may rise regarding documentation to clarify transcription.      ANTHONY Vazquez   Internal Medicine  Department of Hospital Medicine [facility]

## 2024-04-02 NOTE — PROGRESS NOTES
Inpatient Nutrition Evaluation    Admit Date: 2/26/2024   Total duration of encounter: 36 days   Patient Age: 58 y.o.    Nutrition Recommendation/Prescription     Continue regular diet. Adjust diet, per patient request. Requesting sugar free.  2. Continue Arginaid 1 pk BID for wound healing  provides 30 kcal and 4.5 gm protein, per serving. 3. Continue banatrol banana flakes 1pk TID  which is 40 kcal per serving. Continue ascorbic acid 500 mg PO daily 4. Weekly weights 5. Monitor intake, wt, labs, medications     Nutrition Assessment     Chart Review    Reason Seen: continuous nutrition monitoring, length of stay, and follow-up    Malnutrition Screening Tool Results   Have you recently lost weight without trying?: No  Have you been eating poorly because of a decreased appetite?: No   MST Score: 0   Diagnosis:  Infected wounds/sacrum, abd, feet  Klebsiella pneumoniae, Pseudomonas aeruginosa, Proteus mirabilis  DM,   Hypotension  Hyperglycemia  VRE  A-fib       Relevant Medical History:   Cardiac arrest         7/2022    Chronic skin ulcer      DM (diabetes mellitus)      Infected decubitus ulcer      Lymphedema of leg      Neurogenic bladder      Obesity, unspecified      Pressure ulcer of heel      Pressure ulcer of right foot, stage 3      Pressure ulcer of right ischium      Quadriplegia      Quadriplegic spinal paralysis        Scheduled Medications:  acetaminophen, 650 mg, QHS  ascorbic acid (vitamin C), 500 mg, Daily  cetirizine, 10 mg, Daily  collagenase, , Daily  diltiaZEM, 120 mg, Daily  ferrous sulfate, 1 tablet, Daily  fluconazole, 200 mg, Weekly  insulin detemir U-100, 15 Units, QHS  loperamide, 4 mg, Daily  miconazole, , BID  pantoprazole, 40 mg, Daily  SITagliptin phosphate, 100 mg, Daily  sodium chloride 0.9%, 10 mL, Q6H    Continuous Infusions:   PRN Medications: 0.9%  NaCl infusion (for blood administration), 0.9%  NaCl infusion (for blood administration), 0.9%  NaCl infusion (for blood  administration), sodium chloride 0.9%, acetaminophen, albuterol, benzonatate, bisacodyL, budesonide, calcium carbonate, dextrose 10%, dextrose 10%, dextrose 10%, dextrose 10%, diphenhydrAMINE, glucagon (human recombinant), glucagon (human recombinant), glucose, glucose, glucose, glucose, hydrALAZINE, HYDROcodone-acetaminophen, HYDROcodone-acetaminophen, hydrOXYzine pamoate, insulin aspart U-100, loperamide, melatonin, metoprolol, ondansetron, simethicone, Flushing PICC/Midline Protocol **AND** sodium chloride 0.9% **AND** sodium chloride 0.9%, zolpidem    Recent Labs   Lab 03/27/24  0840 03/27/24  1422 03/28/24  0523 03/29/24  0526 04/01/24  0530 04/02/24  0535     --  138 143 141 140   K 5.6* 5.2* 5.1 4.9 5.0 4.9   CALCIUM 8.5  --  8.5 8.6 8.4 8.2*   CHLORIDE 110*  --  108* 113* 111* 110*   CO2 19*  --  19* 20* 22 22   BUN 37.3*  --  35.0* 37.7* 32.3* 29.5*   CREATININE 1.35*  --  1.57* 1.32* 1.28* 1.36*   EGFRNORACEVR >60  --  51 >60 >60 >60   GLUCOSE 118*  --  108* 112* 118* 142*   WBC  --   --  3.47* 3.39* 4.19* 4.56   HGB  --   --  7.5* 8.1* 7.2* 8.0*   HCT  --   --  24.2* 26.5* 23.3* 25.3*     Nutrition Orders:  Diet Adult Regular  Dietary nutrition supplements Arginaid - Any flavor; BID,Dietary nutrition supplements Banatrol Plus Banana Flakes; TID    Appetite/Oral Intake: fair/50-75% of meals  Factors Affecting Nutritional Intake: none identified  Food/Christian/Cultural Preferences:     Food Allergies: none reported  Last Bowel Movement: 03/31/24  Wound(s):     Altered Skin Integrity 05/06/22 1140 Right Heel  Full thickness tissue loss. Subcutaneous fat may be visible but bone, tendon or muscle are not exposed-Tissue loss description: Full thickness       Altered Skin Integrity 01/12/23 1530 Sacral spine Full thickness tissue loss with exposed bone, tendon, or muscle. Often includes undermining and tunneling. May extend into muscle and/or supporting structures.-Tissue loss description: Full  "thickness       Altered Skin Integrity 08/01/23 1534 Left posterior Ankle Full thickness tissue loss. Base is covered by slough and/or eschar in the wound bed-Tissue loss description: Not applicable       Altered Skin Integrity 08/01/23 1535 Left anterior Ankle Other (comment) Full thickness tissue loss. Base is covered by slough and/or eschar in the wound bed-Tissue loss description: Not applicable     Comments    Pt reports intake is good. Pt with family during rounds. Pt reports wounds are improving. Wound care nurse reports all sites remain stable on 4/1/24.   Pt consumes outside foods. PA reports issues with bowel, which as improved, which was reported from mother on 4/2/24. Pt had multiple BM over the weekend. Banatrol Plus was ordered. Will monitor.     Anthropometrics    Height: 5' 7" (170.2 cm), Height Method: Stated  Last Weight: 92 kg (202 lb 13.2 oz) (04/01/24 0617), Weight Method: Bed Scale  BMI (Calculated): 32.6  BMI Classification: obese grade I (BMI 30-34.9)        Ideal Body Weight (IBW), Male: 148 lb     % Ideal Body Weight, Male (lb): 139.86 %                          Usual Weight Provided By: unable to obtain usual weight    Wt Readings from Last 5 Encounters:   04/01/24 92 kg (202 lb 13.2 oz)   02/27/24 93.9 kg (207 lb 0.2 oz)   02/23/24 93.9 kg (207 lb)   08/16/23 106.4 kg (234 lb 9.6 oz)   08/15/23 113.4 kg (250 lb)     Weight Change(s) Since Admission: -1.9 kg wt loss.   Wt Readings from Last 1 Encounters:   04/01/24 0617 92 kg (202 lb 13.2 oz)   03/18/24 0500 94.4 kg (208 lb 1.8 oz)   03/04/24 0500 95 kg (209 lb 7 oz)   02/26/24 1500 93.9 kg (207 lb)   Admit Weight: 93.9 kg (207 lb) (02/26/24 1500), Weight Method: Bed Scale (Used bed weight from admission 2/23/24 as his bed does not have a scale)    Patient Education     Not applicable.    Nutrition Goals & Monitoring     Dietitian will monitor: food and beverage intake, weight, weight change, electrolyte/renal panel, glucose/endocrine " profile, and gastrointestinal profile    Nutrition Risk/Follow-Up: low (follow-up in 5-7 days)  Patients assigned 'low nutrition risk' status do not qualify for a full nutritional assessment but will be monitored and re-evaluated in a 5-7 day time period. Please consult if re-evaluation needed sooner.

## 2024-04-03 LAB
GLUCOSE SERPL-MCNC: 129 MG/DL (ref 70–110)
POCT GLUCOSE: 181 MG/DL (ref 70–110)

## 2024-04-03 PROCEDURE — 27000207 HC ISOLATION

## 2024-04-03 PROCEDURE — 25000003 PHARM REV CODE 250: Performed by: STUDENT IN AN ORGANIZED HEALTH CARE EDUCATION/TRAINING PROGRAM

## 2024-04-03 PROCEDURE — 63600175 PHARM REV CODE 636 W HCPCS: Performed by: STUDENT IN AN ORGANIZED HEALTH CARE EDUCATION/TRAINING PROGRAM

## 2024-04-03 PROCEDURE — 11000004 HC SNF PRIVATE

## 2024-04-03 PROCEDURE — A4216 STERILE WATER/SALINE, 10 ML: HCPCS | Performed by: INTERNAL MEDICINE

## 2024-04-03 PROCEDURE — 25000003 PHARM REV CODE 250: Performed by: PHYSICIAN ASSISTANT

## 2024-04-03 PROCEDURE — 97535 SELF CARE MNGMENT TRAINING: CPT

## 2024-04-03 PROCEDURE — 25000003 PHARM REV CODE 250: Performed by: INTERNAL MEDICINE

## 2024-04-03 PROCEDURE — 94760 N-INVAS EAR/PLS OXIMETRY 1: CPT

## 2024-04-03 PROCEDURE — 99900035 HC TECH TIME PER 15 MIN (STAT)

## 2024-04-03 PROCEDURE — 63600175 PHARM REV CODE 636 W HCPCS: Performed by: INTERNAL MEDICINE

## 2024-04-03 PROCEDURE — 97530 THERAPEUTIC ACTIVITIES: CPT

## 2024-04-03 RX ADMIN — ACETAMINOPHEN 650 MG: 325 TABLET ORAL at 09:04

## 2024-04-03 RX ADMIN — SODIUM CHLORIDE, PRESERVATIVE FREE 10 ML: 5 INJECTION INTRAVENOUS at 12:04

## 2024-04-03 RX ADMIN — CETIRIZINE HYDROCHLORIDE 10 MG: 10 TABLET, FILM COATED ORAL at 09:04

## 2024-04-03 RX ADMIN — OXYCODONE HYDROCHLORIDE AND ACETAMINOPHEN 500 MG: 500 TABLET ORAL at 09:04

## 2024-04-03 RX ADMIN — COLLAGENASE SANTYL: 250 OINTMENT TOPICAL at 09:04

## 2024-04-03 RX ADMIN — INSULIN ASPART 2 UNITS: 100 INJECTION, SOLUTION INTRAVENOUS; SUBCUTANEOUS at 05:04

## 2024-04-03 RX ADMIN — FLUCONAZOLE 200 MG: 200 TABLET ORAL at 09:04

## 2024-04-03 RX ADMIN — SODIUM CHLORIDE, PRESERVATIVE FREE 10 ML: 5 INJECTION INTRAVENOUS at 05:04

## 2024-04-03 RX ADMIN — PANTOPRAZOLE SODIUM 40 MG: 40 TABLET, DELAYED RELEASE ORAL at 09:04

## 2024-04-03 RX ADMIN — DILTIAZEM HYDROCHLORIDE 120 MG: 120 CAPSULE, COATED, EXTENDED RELEASE ORAL at 09:04

## 2024-04-03 RX ADMIN — FERROUS SULFATE TAB 325 MG (65 MG ELEMENTAL FE) 1 EACH: 325 (65 FE) TAB at 09:04

## 2024-04-03 RX ADMIN — MICONAZOLE NITRATE: 20 CREAM TOPICAL at 09:04

## 2024-04-03 RX ADMIN — LOPERAMIDE HYDROCHLORIDE 4 MG: 2 CAPSULE ORAL at 09:04

## 2024-04-03 RX ADMIN — SITAGLIPTIN 100 MG: 50 TABLET, FILM COATED ORAL at 09:04

## 2024-04-03 RX ADMIN — INSULIN DETEMIR 15 UNITS: 100 INJECTION, SOLUTION SUBCUTANEOUS at 09:04

## 2024-04-03 NOTE — PROGRESS NOTES
[facility]  [facility] MEDICINE ~ PROGRESS NOTE  CHIEF COMPLAINT     Hospital follow up for nonhealing wound    HOSPITAL COURSE     58-year-old man with quadriplegic spinal paralysis and numerous decubitus ulcers who is followed by wound care is brought in today due to fever. He had wound cultures done 3 days ago that have grown Proteus mirabilis and Klebsiella pneumoniae. Sensitivities are not complete. The wounds were noted to have significant odor and heavy green drainage. In addition the patient had some nausea and vomiting and home health noted that his blood pressure was dipping. He was advised to come to the emergency room. Wound cultures were drawn from the right hip wound. His significant other reports that when the wound is pressed above pus comes out. Both Klebsiella and Proteus maybe treated with fluoroquinolones which are both IV and oral. The patient's significant other preferred if the patient came home so that she could attend to his wounds however the patient expressed a desire to be admitted.  Patient did undergo a debridement with Dr. Ruvalcaba [id number].  He required 2u pRBC 02/28/2024 and 1u pRBC on 02/29/2024 with improvement of hemoglobin. Wound culture revealed VRE, Proteus, Pseudomonas, Klebsiella. H&H and platelets continue to decrease, suspect Zyvox induced myelosuppression. Appropriate antibiotics (Tobramycin and Zyvox) were initiated on 03/02/2024 therefore he has received 26 days of antibiotic therapy. Patient is noted to have chronic osteomyelitis therefore he would only require 2 weeks of antibiotic therapy for skin and soft tissue infection. Antibiotics discontinued on 03/28/2024 due to risk vs benefits of continuing antibiotic therapy in the setting of myelosuppression and continue with aggressive wound care. Patient did require the addition of Diltiazem for atrial flutter rate control which was subsequently changed to Coreg due to hypotension.  Patient  did not tolerate correct well due to continued increased heart rate therefore he was transitioned back to diltiazem.  Patient did have to receive another unit of PRBCs on 04/01/2024 due to decreasing hemoglobin and continued tachycardia.    Today:  Heart rate remains well controlled with long-acting Cardizem.  Wound VAC order has been placed.  Planning for possible discharge home on Friday with close wound care follow-up.    OBJECTIVE/PHYSICAL EXAM     VITAL SIGNS (MOST RECENT):  Temp: 98.8 °F (37.1 °C) (04/03/24 0900)  Pulse: 87 (04/03/24 0900)  Resp: 18 (04/03/24 0900)  BP: 111/62 (04/03/24 0900)  SpO2: 99 % (04/03/24 0900) VITAL SIGNS (24 HOUR RANGE):  Temp:  [98.1 °F (36.7 °C)-98.8 °F (37.1 °C)] 98.8 °F (37.1 °C)  Pulse:  [82-87] 87  Resp:  [18] 18  SpO2:  [99 %] 99 %  BP: ()/() 111/62     GENERAL: In no acute distress, afebrile  HEENT:  PERRLA  CHEST: Clear to auscultation bilaterally  HEART: S1, S2, no appreciable murmur  ABDOMEN: Soft, nontender, BS +  MSK: Warm, no lower extremity edema, no clubbing or cyanosis  NEUROLOGIC: Alert and oriented x4  INTEGUMENTARY:  See media for wounds    ASSESSMENT/PLAN     Nonhealing chronic wound   VRE, Proteus, Pseudomonas, Klebsiella   Chronic osteomyelitis   S/p debridement with Dr. Jean on 02/27/2024  Zyvox and Tobramycin 03/02/2024-03/28/2024  History of chronic osteomyelitis - only needs 2 weeks of antibiotic therapy for acute skin and soft tissue infection  Discontinue antibiotics 03/28/2024 due to risk vs benefits of continuing antibiotic therapy in the setting of myelosuppression and continue with aggressive wound care  Wound VAC and wound care - ordering home wound vac   Appreciate dietary recommendations for supplements     Atrial flutter  Hypertension/Hypotension   Diltiazem started 3/16/2024-03/28/2024 and required multiple adjustments due to hypotension and tachycardia changed to Coreg  No improvement with Coreg, diltiazem resumed once again  3/31 and transitioned to long acting on 04/02/2024  No AC 2/2 patient request as he has has prior episodes of acute blood loss from wounds - aspirin d/c 02/28/2024  HR improved after blood transfusion 04/01/2024  Hypertensive episodes are associated with pain    Post op anemia   Thrombocytopenia   Myelosuppression 2/2 Zyvox   H&H improved s/p 2u pRBC 02/28/2024, 1u pRBC on 02/29/2024, 1u pRBC on 04/01/2024  No anticoagulation at this time - aspirin d/c 02/28/2024  Peripheral smear without significant findings      Hyperglycemia/hypoglycemia in setting of DM  A1c 02/23/2024 8.5%  Reports taking 80u Levemir BID  Continue home Sitagliptin  Continue titrating insulin regimen as needed    GISSEL - improving   Hyperkalemia - resolved   24 hour urine potassium shows adequate excretion    Left upper extremity swelling - resolved   Ultrasound without DVT     Hx of: Neurogenic bladder, Quadriplegia  Changed suprapubic catheter 03/31/2024     DVT prophylaxis:  SCDs, history of bleeding in the recent past    Anticipated discharge and disposition: home with [facility], awaiting home wound VAC approval __________________________________________________________________________    LABS/MICRO/MEDS/DIAGNOSTICS     LABS  Recent Labs     04/02/24  0535      K 4.9   CHLORIDE 110*   CO2 22   BUN 29.5*   CREATININE 1.36*   GLUCOSE 142*   CALCIUM 8.2*     Recent Labs     04/02/24  0535   WBC 4.56   RBC 2.97*   HCT 25.3*   MCV 85.2   PLT 69*     MICROBIOLOGY  Microbiology Results (last 7 days)       ** No results found for the last 168 hours. **             MEDICATIONS   acetaminophen  650 mg Oral QHS    ascorbic acid (vitamin C)  500 mg Oral Daily    cetirizine  10 mg Oral Daily    collagenase   Topical (Top) Daily    diltiaZEM  120 mg Oral Daily    ferrous sulfate  1 tablet Oral Daily    fluconazole  200 mg Oral Weekly    insulin detemir U-100  15 Units Subcutaneous QHS    loperamide  4 mg Oral Daily    miconazole   Topical (Top) BID     pantoprazole  40 mg Oral Daily    SITagliptin phosphate  100 mg Oral Daily    sodium chloride 0.9%  10 mL Intravenous Q6H         INFUSIONS       DIAGNOSTIC TESTS  US Upper Extremity Veins Left   Final Result      No venous thrombosis identified.         Electronically signed by: Andrade Perez   Date:    03/18/2024   Time:    21:08      X-Ray Chest 1 View   Final Result      Left PICC tip overlies the mid SVC.         Electronically signed by: Oj Solomon   Date:    02/28/2024   Time:    14:29         EF   Date Value Ref Range Status   05/09/2023 60 % Final   07/18/2022 40 % Final        NUTRITION STATUS  Patient meets ASPEN criteria for   malnutrition of   per RD assessment as evidenced by:                       A minimum of two characteristics is recommended for diagnosis of either severe or non-severe malnutrition.     Case related differential diagnoses have been reviewed; assessment and plan has been documented. I have personally reviewed the labs and test results that are currently available; I have reviewed the patients medication list. I have reviewed the consulting providers recommendations. I have reviewed or attempted to review medical records based upon their availability.  All of the patient's and/or family's questions have been addressed and answered to the best of my ability.  I will continue to monitor closely and make adjustments to medical management as needed.  This document was created using M*Modal Fluency Direct.  Transcription errors may have been made.  Please contact me if any questions may rise regarding documentation to clarify transcription.      ANTHONY Vazquez   Internal Medicine  Department of Hospital Medicine [facility]

## 2024-04-03 NOTE — PROGRESS NOTES
Name: Antonio Galvan II    : 1965 (58 y.o.)  MRN: [id number]            Interdisciplinary Team Conference     Case conference held with patient/family and care team to discuss progress, plan of care, barriers to be addressed for safe return home, equipment recommendations, and discharge planning. Communicated therapy progress with MD, RN, therapy clinicians and case management. All questions/concerns answered.

## 2024-04-03 NOTE — PLAN OF CARE
Weekly Staffing Report      Date Admitted: 2/26/2024 :   Staffing Date: 4/3/2024     Patient Active Problem List   Diagnosis    Uncontrolled type 2 diabetes mellitus with hyperglycemia    Atrial flutter    Constipation    Abnormal urinalysis    Obstructive sleep apnea syndrome    Acute deep vein thrombosis (DVT) of brachial vein of right upper extremity    Quadriplegia    Intolerance of continuous positive airway pressure (CPAP) ventilation    Complex sleep apnea syndrome    Open wound of left hip    Pressure injury of right ischium, stage 4    Osteomyelitis    Sacral decubitus ulcer, stage II    Chronic blood loss anemia    Infected pressure ulcer, unspecified pressure ulcer stage          Team Members Present:       Nursing Present     Yes [x]    No []    Physical Therapy Present    Yes [x]    No []    Occupational Therapy Present     Yes []    No [x]    Speech Therapy Present    Yes []    No []    NA [x]    Dietary Present    Yes [x]    No []        Physician Present     [x] Thairy Reyes    [] Jeanette Mann    [x] ANTHONY Howe    []       Family Present    [] Adult Children    [] Spouse    [] POA    [] Friend/ Caregiver    [] Other       Interdisciplinary Meeting Notes:  Mother, Judy, on phone. PT- no major changes. Still using tess to get in chair. OT - does not see.Medically- switched diltiazem. Doing well after blood transfusion. Plan to discharge Friday if wound vac approved. Will discharge with home health. All questions answered at this time                Please see Documented PT/OT/ST, Dietary, Nursing, and Physician notes for detailed treatment information.

## 2024-04-03 NOTE — PT/OT/SLP PROGRESS
Physical Therapy Treatment Note           Name: Antonio Galvan II    : 1965 (58 y.o.)  MRN: [id number]           TREATMENT SUMMARY AND RECOMMENDATIONS:    PT Received On: 24  PT Start Time: 1505     PT Stop Time: 1529  PT Total Time (min): 24 min     Subjective Assessment:   No complaints  Lethargic   x Awake, alert, cooperative  Uncooperative    Agitated  c/o pain    Appropriate  c/o fatigue    Confused x Treated at bedside     Emotionally labile  Treated in gym/dept.    Impulsive  Other:    Flat affect       Therapy Precautions:    Cognitive deficits  Spinal precautions    Collar - hard  Sternal precautions    Collar - soft   TLSO    Fall risk  LSO    Hip precautions - posterior  Knee immobilizer    Hip precautions - anterior  WBAT    Impaired communication  Partial weightbearing    Oxygen  TTWB    PEG tube  NWB    Visual deficits x Other: Wound Vac and Super pubic cath    Hearing deficits  x OK 'd to get in chair 2-3 hours per day       Treatment Objectives:     Mobility Training:   Assist level Comments    Bed mobility Total A Rolling side to side for tess pad removal and doffing pants. Proper bed positioning completed with pt in supine using pillow for UE and LE to reduce skin break down.    Transfer Total A Tess from chair>bed; x 2-3 person assistance for maintenance of cath, wound vac and B LE   Gait     Sit to stand transitions     Sitting balance     Standing balance      Wheelchair mobility     Car transfer              Therapeutic Exercise:   Exercise Sets Reps Comments                               Additional Comments:  Extra time needed for proper positioning once back in bed.     Education and hand out provided for non friction slide board/ Beasy Board since pt reports tess is not possible with his care givers. CM seeing if insurance will cover.     PT is recommending a WC re-eval for better cushion recommendations and fit of WC to best protect current wounds     PT to  continue educating     Assessment: Patient tolerated session well , but still needing 2-3 person assistance for positioning during tess.     PT Plan: Continue per POC  Revisions made to plan of care: Goals reviewed and updated     GOALS:   Multidisciplinary Problems       Physical Therapy Goals          Problem: Physical Therapy    Goal Priority Disciplines Outcome Goal Variances Interventions   Physical Therapy Goal     PT, PT/OT Ongoing, Progressing     Description: Goals to be met by: Discharge     Pt to be seen for ROM tasks 1-2 x/day to ensure proper positioning can be obtained to overall reduce impacts of prolonged bed rest and skin break down    Progress pending healing stages of current wounds    Patient will increase functional independence with mobility by performin. Pt to tolerate PROM tasks to B UE and LE, while obtaining 25% improvement in range.   2. Sitting EOB to tolerance pending wound healing - with pt demonstrating normal BP- Goal Met  3. Progress to OOB into chair once able. - Met  4. Pt to tolerate up to 3 hours in power chair - Met  5. Caregivers to be able to use tess with 1-2 people.                          Skilled PT Minutes Provided: 23   Communication with Treatment Team:     Equipment recommendations:       At end of treatment, patient remained:   Up in chair     Up in wheelchair in room   x In bed    With alarm activated    Bed rails up   x Call bell in reach    x Family/friends present    Restraints secured properly    In bathroom with CNA/RN notified   x Nurse aware    In gym with therapist/tech    Other:

## 2024-04-03 NOTE — PLAN OF CARE
Problem: Adult Inpatient Plan of Care  Goal: Plan of Care Review  Outcome: Ongoing, Progressing  Flowsheets (Taken 4/3/2024 1222)  Plan of Care Reviewed With: patient  Goal: Patient-Specific Goal (Individualized)  Outcome: Ongoing, Progressing  Flowsheets (Taken 4/3/2024 1222)  Anxieties, Fears or Concerns: none expressed at this time  Individualized Care Needs: Monitor CBGs and administer supplemental insulin as ordered, Monitor BP/ HR, maintian seal on wound vac, wound care daily,  Goal: Absence of Hospital-Acquired Illness or Injury  Outcome: Ongoing, Progressing  Intervention: Identify and Manage Fall Risk  Flowsheets (Taken 4/3/2024 1222)  Safety Promotion/Fall Prevention:   assistive device/personal item within reach   chair alarm set  Intervention: Prevent Skin Injury  Flowsheets (Taken 4/3/2024 1222)  Body Position: (up in motorized wheelchair) other (see comments)  Skin Protection:   adhesive use limited   incontinence pads utilized   tubing/devices free from skin contact  Intervention: Prevent and Manage VTE (Venous Thromboembolism) Risk  Flowsheets (Taken 4/3/2024 1222)  Activity Management: Up in chair - L3  VTE Prevention/Management:   bleeding precations maintained   bleeding risk assessed   fluids promoted  Range of Motion: active ROM (range of motion) encouraged  Intervention: Prevent Infection  Flowsheets (Taken 4/3/2024 1222)  Infection Prevention:   cohorting utilized   environmental surveillance performed   equipment surfaces disinfected   hand hygiene promoted   rest/sleep promoted   single patient room provided  Goal: Optimal Comfort and Wellbeing  Outcome: Ongoing, Progressing  Intervention: Monitor Pain and Promote Comfort  Flowsheets (Taken 4/3/2024 1222)  Pain Management Interventions:   care clustered   pillow support provided   position adjusted   relaxation techniques promoted   quiet environment facilitated  Intervention: Provide Person-Centered Care  Flowsheets (Taken 4/3/2024  1222)  Trust Relationship/Rapport:   care explained   questions encouraged   choices provided   reassurance provided   emotional support provided   thoughts/feelings acknowledged   empathic listening provided   questions answered  Goal: Readiness for Transition of Care  Outcome: Ongoing, Progressing  Intervention: Mutually Develop Transition Plan  Flowsheets (Taken 4/3/2024 1222)  Communicated SADE with patient/caregiver: Date not available/Unable to determine     Problem: Diabetes Comorbidity  Goal: Blood Glucose Level Within Targeted Range  Outcome: Ongoing, Progressing  Intervention: Monitor and Manage Glycemia  Flowsheets (Taken 4/3/2024 1222)  Glycemic Management:   blood glucose monitored   supplemental insulin given     Problem: Skin Injury Risk Increased  Goal: Skin Health and Integrity  Outcome: Ongoing, Progressing  Intervention: Optimize Skin Protection  Flowsheets (Taken 4/3/2024 1222)  Pressure Reduction Techniques:   weight shift assistance provided   pressure points protected   positioned off wounds   heels elevated off bed  Pressure Reduction Devices:   pressure-redistributing mattress utilized   positioning supports utilized   heel offloading device utilized   specialty bed utilized  Skin Protection:   adhesive use limited   incontinence pads utilized   tubing/devices free from skin contact  Head of Bed (HOB) Positioning: HOB at 30-45 degrees  Intervention: Promote and Optimize Oral Intake  Flowsheets (Taken 4/3/2024 1222)  Oral Nutrition Promotion:   calorie-dense foods provided   calorie-dense liquids provided   safe use of adaptive equipment encouraged     Problem: Impaired Wound Healing  Goal: Optimal Wound Healing  Outcome: Ongoing, Progressing  Intervention: Promote Wound Healing  Flowsheets (Taken 4/3/2024 1222)  Oral Nutrition Promotion:   calorie-dense foods provided   calorie-dense liquids provided   safe use of adaptive equipment encouraged  Sleep/Rest Enhancement:   awakenings minimized    consistent schedule promoted   regular sleep/rest pattern promoted   relaxation techniques promoted  Activity Management: Up in chair - L3  Pain Management Interventions:   care clustered   pillow support provided   position adjusted   relaxation techniques promoted   quiet environment facilitated     Problem: Fall Injury Risk  Goal: Absence of Fall and Fall-Related Injury  Outcome: Ongoing, Progressing  Intervention: Identify and Manage Contributors  Flowsheets (Taken 4/3/2024 1222)  Self-Care Promotion:   independence encouraged   BADL personal objects within reach   BADL personal routines maintained   meal set-up provided   safe use of adaptive equipment encouraged  Medication Review/Management:   medications reviewed   high-risk medications identified  Intervention: Promote Injury-Free Environment  Flowsheets (Taken 4/3/2024 1222)  Safety Promotion/Fall Prevention:   assistive device/personal item within reach   chair alarm set     Problem: Infection  Goal: Absence of Infection Signs and Symptoms  Outcome: Ongoing, Progressing  Intervention: Prevent or Manage Infection  Flowsheets (Taken 4/3/2024 1222)  Fever Reduction/Comfort Measures:   lightweight bedding   lightweight clothing  Infection Management: aseptic technique maintained  Isolation Precautions:   precautions maintained   contact

## 2024-04-03 NOTE — PLAN OF CARE
[facility]  Discharge Reassessment    Primary Care Provider: Jennifer Fernando NP    Expected Discharge Date: 4/5/2024    Reassessment (most recent)       Discharge Reassessment - 04/03/24 0824          Discharge Reassessment    Assessment Type Discharge Planning Reassessment     Did the patient's condition or plan change since previous assessment? No     Discharge Plan discussed with: Patient     Communicated SADE with patient/caregiver Date not available/Unable to determine     Discharge Plan A Home with family;Home Health     DME Needed Upon Discharge  negative pressure wound therapy device     Transition of Care Barriers None     Why the patient remains in the hospital Requires continued medical care        Post-Acute Status    Post-Acute Authorization Home Health     Home Health Status Pending medical clearance/testing     Hospital Resources/Appts/Education Provided Provided patient/caregiver with written discharge plan information     Discharge Delays None known at this time

## 2024-04-03 NOTE — PT/OT/SLP PROGRESS
Physical Therapy Treatment Note           Name: Antonio Galvan II    : 1965 (58 y.o.)  MRN: [id number]           TREATMENT SUMMARY AND RECOMMENDATIONS:    PT Received On: 24  PT Start Time: 1115     PT Stop Time: 1145  PT Total Time (min): 30 min     Subjective Assessment:   No complaints  Lethargic   x Awake, alert, cooperative  Uncooperative    Agitated  c/o pain    Appropriate  c/o fatigue    Confused x Treated at bedside     Emotionally labile  Treated in gym/dept.    Impulsive  Other:    Flat affect       Therapy Precautions:    Cognitive deficits  Spinal precautions    Collar - hard  Sternal precautions    Collar - soft   TLSO    Fall risk  LSO    Hip precautions - posterior  Knee immobilizer    Hip precautions - anterior  WBAT    Impaired communication  Partial weightbearing    Oxygen  TTWB    PEG tube  NWB    Visual deficits x Other: Wound Vac and Super pubic cath    Hearing deficits  x OK 'd to get in chair 2-3 hours per day       Treatment Objectives:     Mobility Training:   Assist level Comments    Bed mobility Total A Rolling side to side for tess pad placement and donning pants. Proper WC positioning completed with to reduce skin break down.    Transfer Total A Tess from bed>chair; x 2-3 person assistance for maintenance of cath, wound vac and B LE   Gait     Sit to stand transitions     Sitting balance     Standing balance      Wheelchair mobility     Car transfer              Therapeutic Exercise:   Exercise Sets Reps Comments                               Additional Comments:  Extra time needed for proper positioning once back in bed.     Will continue caregiver training on tess and safety with slide board (this is what they were doing at home)    PT had a long conversation with pt and caregiver regarding wound care stating they do not feel slide board would be safe for his wounds and they do not recommend using pool ring in seat like he is currently using for cushion. Pt  was provided hand outs with recommended cushion.     Pt states they do not want a tess or ceiling lift due to only having 1 assistant.     PT is recommending a WC re-eval for better cushion recommendations and fit of WC to best protect current wounds     PT to continue educating     Assessment: Patient tolerated session well , but still needing 2-3 person assistance for positioning during tess.     PT Plan: Continue per POC  Revisions made to plan of care: No    GOALS:   Multidisciplinary Problems       Physical Therapy Goals          Problem: Physical Therapy    Goal Priority Disciplines Outcome Goal Variances Interventions   Physical Therapy Goal     PT, PT/OT Ongoing, Progressing     Description: Goals to be met by: Discharge     Pt to be seen for ROM tasks 1-2 x/day to ensure proper positioning can be obtained to overall reduce impacts of prolonged bed rest and skin break down    Progress pending healing stages of current wounds    Patient will increase functional independence with mobility by performin. Pt to tolerate PROM tasks to B UE and LE, while obtaining 25% improvement in range.   2. Sitting EOB to tolerance pending wound healing - with pt demonstrating normal BP  3. Progress to OOB into chair once able. - Met  4. Pt to tolerate up to 3 hours in power chair - Met  5. Caregivers to be able to use tess with 1-2 people.                          Skilled PT Minutes Provided: 25   Communication with Treatment Team:     Equipment recommendations:       At end of treatment, patient remained:   Up in chair    x Up in wheelchair in room    In bed    With alarm activated    Bed rails up   x Call bell in reach    x Family/friends present    Restraints secured properly    In bathroom with CNA/RN notified   x Nurse aware    In gym with therapist/tech    Other:

## 2024-04-03 NOTE — PLAN OF CARE
Problem: Adult Inpatient Plan of Care  Goal: Plan of Care Review  Outcome: Ongoing, Progressing  Flowsheets (Taken 4/3/2024 0115)  Plan of Care Reviewed With: patient  Goal: Patient-Specific Goal (Individualized)  Outcome: Ongoing, Progressing  Flowsheets (Taken 4/3/2024 0115)  Anxieties, Fears or Concerns: none at this time  Individualized Care Needs: Monitor CBGs and administer supplemental insulin as ordered, Monitor BP/HR, Monitor Wound Vac and maintain seal,Maintain safety, Wound Care, Monitor and maintain suprapubic catheter  Goal: Absence of Hospital-Acquired Illness or Injury  Outcome: Ongoing, Progressing  Intervention: Prevent and Manage VTE (Venous Thromboembolism) Risk  Flowsheets (Taken 4/2/2024 2000)  VTE Prevention/Management:   bleeding precations maintained   bleeding risk assessed   fluids promoted   ROM (passive) performed  Goal: Optimal Comfort and Wellbeing  Outcome: Ongoing, Progressing     Problem: Diabetes Comorbidity  Goal: Blood Glucose Level Within Targeted Range  Outcome: Ongoing, Progressing

## 2024-04-04 LAB — POCT GLUCOSE: 164 MG/DL (ref 70–110)

## 2024-04-04 PROCEDURE — 25000003 PHARM REV CODE 250: Performed by: STUDENT IN AN ORGANIZED HEALTH CARE EDUCATION/TRAINING PROGRAM

## 2024-04-04 PROCEDURE — 94760 N-INVAS EAR/PLS OXIMETRY 1: CPT

## 2024-04-04 PROCEDURE — 97606 NEG PRS WND THER DME>50 SQCM: CPT

## 2024-04-04 PROCEDURE — 25000003 PHARM REV CODE 250: Performed by: INTERNAL MEDICINE

## 2024-04-04 PROCEDURE — 27000207 HC ISOLATION

## 2024-04-04 PROCEDURE — A4216 STERILE WATER/SALINE, 10 ML: HCPCS | Performed by: INTERNAL MEDICINE

## 2024-04-04 PROCEDURE — 99900035 HC TECH TIME PER 15 MIN (STAT)

## 2024-04-04 PROCEDURE — 11000004 HC SNF PRIVATE

## 2024-04-04 PROCEDURE — 63600175 PHARM REV CODE 636 W HCPCS: Performed by: STUDENT IN AN ORGANIZED HEALTH CARE EDUCATION/TRAINING PROGRAM

## 2024-04-04 PROCEDURE — 97530 THERAPEUTIC ACTIVITIES: CPT

## 2024-04-04 PROCEDURE — 25000003 PHARM REV CODE 250: Performed by: PHYSICIAN ASSISTANT

## 2024-04-04 PROCEDURE — 63600175 PHARM REV CODE 636 W HCPCS: Performed by: INTERNAL MEDICINE

## 2024-04-04 RX ADMIN — SITAGLIPTIN 100 MG: 50 TABLET, FILM COATED ORAL at 08:04

## 2024-04-04 RX ADMIN — SODIUM CHLORIDE, PRESERVATIVE FREE 10 ML: 5 INJECTION INTRAVENOUS at 12:04

## 2024-04-04 RX ADMIN — SODIUM CHLORIDE, PRESERVATIVE FREE 10 ML: 5 INJECTION INTRAVENOUS at 06:04

## 2024-04-04 RX ADMIN — MICONAZOLE NITRATE: 20 CREAM TOPICAL at 08:04

## 2024-04-04 RX ADMIN — HYDROCODONE BITARTRATE AND ACETAMINOPHEN 1 TABLET: 7.5; 325 TABLET ORAL at 02:04

## 2024-04-04 RX ADMIN — OXYCODONE HYDROCHLORIDE AND ACETAMINOPHEN 500 MG: 500 TABLET ORAL at 08:04

## 2024-04-04 RX ADMIN — INSULIN ASPART 2 UNITS: 100 INJECTION, SOLUTION INTRAVENOUS; SUBCUTANEOUS at 06:04

## 2024-04-04 RX ADMIN — COLLAGENASE SANTYL: 250 OINTMENT TOPICAL at 08:04

## 2024-04-04 RX ADMIN — ACETAMINOPHEN 650 MG: 325 TABLET ORAL at 09:04

## 2024-04-04 RX ADMIN — LOPERAMIDE HYDROCHLORIDE 4 MG: 2 CAPSULE ORAL at 08:04

## 2024-04-04 RX ADMIN — PANTOPRAZOLE SODIUM 40 MG: 40 TABLET, DELAYED RELEASE ORAL at 08:04

## 2024-04-04 RX ADMIN — INSULIN DETEMIR 15 UNITS: 100 INJECTION, SOLUTION SUBCUTANEOUS at 09:04

## 2024-04-04 RX ADMIN — CETIRIZINE HYDROCHLORIDE 10 MG: 10 TABLET, FILM COATED ORAL at 08:04

## 2024-04-04 RX ADMIN — DILTIAZEM HYDROCHLORIDE 120 MG: 120 CAPSULE, COATED, EXTENDED RELEASE ORAL at 08:04

## 2024-04-04 RX ADMIN — SODIUM CHLORIDE, PRESERVATIVE FREE 10 ML: 5 INJECTION INTRAVENOUS at 11:04

## 2024-04-04 RX ADMIN — FERROUS SULFATE TAB 325 MG (65 MG ELEMENTAL FE) 1 EACH: 325 (65 FE) TAB at 08:04

## 2024-04-04 NOTE — PLAN OF CARE
Problem: Adult Inpatient Plan of Care  Goal: Plan of Care Review  4/4/2024 1137 by Nasreen Benjamin LPN  Outcome: Ongoing, Progressing  Flowsheets (Taken 4/4/2024 1137)  Plan of Care Reviewed With:   patient   caregiver  4/4/2024 1132 by Nasreen Benjamin LPN  Outcome: Ongoing, Progressing  Flowsheets (Taken 4/4/2024 1132)  Plan of Care Reviewed With:   patient   caregiver  Goal: Patient-Specific Goal (Individualized)  4/4/2024 1137 by Nasreen Benjamin LPN  Outcome: Ongoing, Progressing  Flowsheets (Taken 4/4/2024 1137)  Anxieties, Fears or Concerns: None expressed at this time  Individualized Care Needs: Monitor CBGs and give supplemental insulin as needed, monitor BP/HR, maintain wound vac seal, wound care daily,  4/4/2024 1132 by Nasreen Benjamin LPN  Outcome: Ongoing, Progressing  Flowsheets (Taken 4/4/2024 1132)  Anxieties, Fears or Concerns: none expressed at this time  Goal: Absence of Hospital-Acquired Illness or Injury  4/4/2024 1137 by Nasreen Benjamin LPN  Outcome: Ongoing, Progressing  4/4/2024 1132 by Nasreen Benjamin LPN  Outcome: Ongoing, Progressing  Intervention: Identify and Manage Fall Risk  Flowsheets (Taken 4/4/2024 1137)  Safety Promotion/Fall Prevention:   assistive device/personal item within reach   family to remain at bedside   side rails raised x 2  Intervention: Prevent Skin Injury  Flowsheets (Taken 4/4/2024 1137)  Body Position: (up in motorized wheelchair) other (see comments)  Skin Protection:   adhesive use limited   incontinence pads utilized   tubing/devices free from skin contact  Intervention: Prevent and Manage VTE (Venous Thromboembolism) Risk  Flowsheets (Taken 4/4/2024 1137)  Activity Management: Patient unable to perform activities  VTE Prevention/Management:   bleeding precations maintained   bleeding risk assessed   ROM (passive) performed   fluids promoted  Range of Motion: active ROM (range of motion) encouraged  Intervention: Prevent Infection  Flowsheets (Taken  4/4/2024 1137)  Infection Prevention:   hand hygiene promoted   single patient room provided   rest/sleep promoted   equipment surfaces disinfected   personal protective equipment utilized   environmental surveillance performed   cohorting utilized  Goal: Optimal Comfort and Wellbeing  4/4/2024 1137 by Nasreen Benjamin LPN  Outcome: Ongoing, Progressing  4/4/2024 1132 by Nasreen Benjamin LPN  Outcome: Ongoing, Progressing  Intervention: Monitor Pain and Promote Comfort  Flowsheets (Taken 4/4/2024 1137)  Pain Management Interventions:   care clustered   pillow support provided   position adjusted   relaxation techniques promoted   quiet environment facilitated  Intervention: Provide Person-Centered Care  Flowsheets (Taken 4/4/2024 1137)  Trust Relationship/Rapport:   questions encouraged   care explained   choices provided   reassurance provided   emotional support provided   thoughts/feelings acknowledged   empathic listening provided   questions answered  Goal: Readiness for Transition of Care  4/4/2024 1137 by Nasreen Benjamin LPN  Outcome: Ongoing, Progressing  4/4/2024 1132 by Nasreen Benjamin LPN  Outcome: Ongoing, Progressing  Intervention: Mutually Develop Transition Plan  Flowsheets (Taken 4/4/2024 1137)  Communicated SADE with patient/caregiver: Date not available/Unable to determine     Problem: Diabetes Comorbidity  Goal: Blood Glucose Level Within Targeted Range  4/4/2024 1137 by Nasreen Benjamin LPN  Outcome: Ongoing, Progressing  4/4/2024 1132 by Nasreen Benjamin LPN  Outcome: Ongoing, Progressing  Intervention: Monitor and Manage Glycemia  Flowsheets (Taken 4/4/2024 1137)  Glycemic Management:   blood glucose monitored   supplemental insulin given     Problem: Skin Injury Risk Increased  Goal: Skin Health and Integrity  4/4/2024 1137 by Nasreen Benjamin LPN  Outcome: Ongoing, Progressing  4/4/2024 1132 by Nasreen Benjamin LPN  Outcome: Ongoing, Progressing  Intervention: Optimize Skin  Protection  Flowsheets (Taken 4/4/2024 1137)  Pressure Reduction Techniques:   weight shift assistance provided   pressure points protected   positioned off wounds   heels elevated off bed   frequent weight shift encouraged  Pressure Reduction Devices:   specialty bed utilized   heel offloading device utilized   positioning supports utilized   pressure-redistributing mattress utilized  Skin Protection:   adhesive use limited   incontinence pads utilized   tubing/devices free from skin contact  Head of Bed (HOB) Positioning: HOB at 30-45 degrees  Intervention: Promote and Optimize Oral Intake  Flowsheets (Taken 4/4/2024 1137)  Oral Nutrition Promotion:   calorie-dense foods provided   calorie-dense liquids provided   safe use of adaptive equipment encouraged   physical activity promoted     Problem: Impaired Wound Healing  Goal: Optimal Wound Healing  4/4/2024 1137 by Nasreen Benjamin LPN  Outcome: Ongoing, Progressing  4/4/2024 1132 by Nasreen Benjamin LPN  Outcome: Ongoing, Progressing  Intervention: Promote Wound Healing  Flowsheets (Taken 4/4/2024 1137)  Oral Nutrition Promotion:   calorie-dense foods provided   calorie-dense liquids provided   safe use of adaptive equipment encouraged   physical activity promoted  Sleep/Rest Enhancement:   awakenings minimized   consistent schedule promoted   regular sleep/rest pattern promoted   relaxation techniques promoted  Activity Management: Patient unable to perform activities  Pain Management Interventions:   care clustered   pillow support provided   position adjusted   relaxation techniques promoted   quiet environment facilitated     Problem: Fall Injury Risk  Goal: Absence of Fall and Fall-Related Injury  4/4/2024 1137 by Nasreen Benjamin LPN  Outcome: Ongoing, Progressing  4/4/2024 1132 by Nasreen Benjamin LPN  Outcome: Ongoing, Progressing  Intervention: Identify and Manage Contributors  Flowsheets (Taken 4/4/2024 1137)  Self-Care Promotion:   independence  encouraged   BADL personal objects within reach   BADL personal routines maintained   meal set-up provided   safe use of adaptive equipment encouraged  Medication Review/Management:   medications reviewed   high-risk medications identified  Intervention: Promote Injury-Free Environment  Flowsheets (Taken 4/4/2024 1137)  Safety Promotion/Fall Prevention:   assistive device/personal item within reach   family to remain at bedside   side rails raised x 2     Problem: Infection  Goal: Absence of Infection Signs and Symptoms  4/4/2024 1137 by Nasreen Benjamin LPN  Outcome: Ongoing, Progressing  4/4/2024 1132 by Nasreen Benjamin LPN  Outcome: Ongoing, Progressing  Intervention: Prevent or Manage Infection  Flowsheets (Taken 4/4/2024 1137)  Fever Reduction/Comfort Measures:   lightweight bedding   lightweight clothing  Infection Management: aseptic technique maintained  Isolation Precautions:   precautions maintained   contact

## 2024-04-04 NOTE — PLAN OF CARE
Problem: Adult Inpatient Plan of Care  Goal: Plan of Care Review  Outcome: Ongoing, Progressing  Flowsheets (Taken 4/4/2024 0205)  Plan of Care Reviewed With:   patient   family  Goal: Patient-Specific Goal (Individualized)  Outcome: Ongoing, Progressing  Flowsheets (Taken 4/4/2024 0205)  Anxieties, Fears or Concerns: none at this time  Individualized Care Needs: Monitor CBGs and administer supplemental insulin as ordered, Monitor BP/HR, Maintain seal on wound vac, Wound care daily, Suprapubic cath  Goal: Absence of Hospital-Acquired Illness or Injury  Outcome: Ongoing, Progressing  Intervention: Prevent and Manage VTE (Venous Thromboembolism) Risk  Flowsheets (Taken 4/3/2024 2000)  VTE Prevention/Management:   bleeding precations maintained   bleeding risk assessed   ROM (passive) performed   fluids promoted  Intervention: Prevent Infection  Flowsheets (Taken 4/3/2024 2000)  Infection Prevention:   hand hygiene promoted   personal protective equipment utilized   rest/sleep promoted   single patient room provided  Goal: Optimal Comfort and Wellbeing  Outcome: Ongoing, Progressing     Problem: Diabetes Comorbidity  Goal: Blood Glucose Level Within Targeted Range  Outcome: Ongoing, Progressing

## 2024-04-04 NOTE — PROGRESS NOTES
[facility]  \[facility] [place] [facility]\"" MEDICINE ~ PROGRESS NOTE  CHIEF COMPLAINT     Hospital follow up for nonhealing wound    HOSPITAL COURSE     58-year-old man with quadriplegic spinal paralysis and numerous decubitus ulcers who is followed by wound care is brought in today due to fever. He had wound cultures done 3 days ago that have grown Proteus mirabilis and Klebsiella pneumoniae. Sensitivities are not complete. The wounds were noted to have significant odor and heavy green drainage. In addition the patient had some nausea and vomiting and home health noted that his blood pressure was dipping. He was advised to come to the emergency room. Wound cultures were drawn from the right hip wound. His significant other reports that when the wound is pressed above pus comes out. Both Klebsiella and Proteus maybe treated with fluoroquinolones which are both IV and oral. The patient's significant other preferred if the patient came home so that she could attend to his wounds however the patient expressed a desire to be admitted.  Patient did undergo a debridement with Dr. Ruvalcaba [id number].  He required 2u pRBC 02/28/2024 and 1u pRBC on 02/29/2024 with improvement of hemoglobin. Wound culture revealed VRE, Proteus, Pseudomonas, Klebsiella. H&H and platelets continue to decrease, suspect Zyvox induced myelosuppression. Appropriate antibiotics (Tobramycin and Zyvox) were initiated on 03/02/2024 therefore he has received 26 days of antibiotic therapy. Patient is noted to have chronic osteomyelitis therefore he would only require 2 weeks of antibiotic therapy for skin and soft tissue infection. Antibiotics discontinued on 03/28/2024 due to risk vs benefits of continuing antibiotic therapy in the setting of myelosuppression and continue with aggressive wound care. Patient did require the addition of Diltiazem for atrial flutter rate control which was subsequently changed to Coreg due to hypotension.  Patient  did not tolerate correct well due to continued increased heart rate therefore he was transitioned back to diltiazem.  Patient did have to receive another unit of PRBCs on 04/01/2024 due to decreasing hemoglobin and continued tachycardia.    Today:  No reported complaints today.  Patient does not have a Wilberto lift at home and states he does not want 1 at this time as it is too difficult to manage with just 1 person and he does not have 2 people to help at all times.  We are looking into a slide board option that has a disc on it allowing the patient to sit on the disc and the disc itself slice rather than sliding his bottom across the board.  This would allow for better protection of the wound VAC seal and his wounds.  Still awaiting home wound VAC approval.  Patient does have [facility] on Tuesday 1430.    OBJECTIVE/PHYSICAL EXAM     VITAL SIGNS (MOST RECENT):  Temp: 98.8 °F (37.1 °C) (04/04/24 0742)  Pulse: 98 (04/04/24 0742)  Resp: 18 (04/03/24 2012)  BP: (!) 165/95 (04/04/24 0855)  SpO2: 98 % (04/03/24 2012) VITAL SIGNS (24 HOUR RANGE):  Temp:  [98.1 °F (36.7 °C)-98.8 °F (37.1 °C)] 98.8 °F (37.1 °C)  Pulse:  [77-98] 98  Resp:  [18] 18  SpO2:  [98 %-99 %] 98 %  BP: (135-165)/(88-95) 165/95     GENERAL: In no acute distress, afebrile  HEENT:  PERRLA  CHEST: Clear to auscultation bilaterally  HEART: S1, S2, no appreciable murmur  ABDOMEN: Soft, nontender, BS +  MSK: Warm, no lower extremity edema, no clubbing or cyanosis  NEUROLOGIC: Alert and oriented x4  INTEGUMENTARY:  See media for wounds    ASSESSMENT/PLAN     Nonhealing chronic wound   VRE, Proteus, Pseudomonas, Klebsiella   Chronic osteomyelitis   S/p debridement with Dr. Jean on 02/27/2024  Zyvox and Tobramycin 03/02/2024-03/28/2024  History of chronic osteomyelitis - only needs 2 weeks of antibiotic therapy for acute skin and soft tissue infection  Discontinue antibiotics 03/28/2024 due to risk vs benefits of  continuing antibiotic therapy in the setting of myelosuppression and continue with aggressive wound care  Wound VAC and wound care - ordering home wound vac   Appreciate dietary recommendations for supplements     Atrial flutter  Hypertension/Hypotension   Diltiazem started 3/16/2024-03/28/2024 and required multiple adjustments due to hypotension and tachycardia changed to Coreg  No improvement with Coreg, diltiazem resumed once again 3/31 and transitioned to long acting on 04/02/2024  No AC 2/2 patient request as he has has prior episodes of acute blood loss from wounds - aspirin d/c 02/28/2024  HR improved after blood transfusion 04/01/2024  Hypertensive episodes are associated with pain    Post op anemia   Thrombocytopenia   Myelosuppression 2/2 Zyvox   H&H improved s/p 2u pRBC 02/28/2024, 1u pRBC on 02/29/2024, 1u pRBC on 04/01/2024  No anticoagulation at this time - aspirin d/c 02/28/2024  Peripheral smear without significant findings      Hyperglycemia/hypoglycemia in setting of DM  A1c 02/23/2024 8.5%  Reports taking 80u Levemir BID  Continue home Sitagliptin  Continue titrating insulin regimen as needed    GISSEL - improving   Hyperkalemia - resolved   24 hour urine potassium shows adequate excretion    Left upper extremity swelling - resolved   Ultrasound without DVT     Hx of: Neurogenic bladder, Quadriplegia  Changed suprapubic catheter 03/31/2024     DVT prophylaxis:  SCDs, history of bleeding in the recent past    Anticipated discharge and disposition: home with [facility], awaiting home wound VAC approval __________________________________________________________________________    LABS/MICRO/MEDS/DIAGNOSTICS     LABS  Recent Labs     04/02/24  0535      K 4.9   CHLORIDE 110*   CO2 22   BUN 29.5*   CREATININE 1.36*   GLUCOSE 142*   CALCIUM 8.2*     Recent Labs     04/02/24  0535   WBC 4.56   RBC 2.97*   HCT 25.3*   MCV 85.2   PLT 69*     MICROBIOLOGY  Microbiology Results (last 7 days)       ** No results  found for the last 168 hours. **             MEDICATIONS   acetaminophen  650 mg Oral QHS    ascorbic acid (vitamin C)  500 mg Oral Daily    cetirizine  10 mg Oral Daily    collagenase   Topical (Top) Daily    diltiaZEM  120 mg Oral Daily    ferrous sulfate  1 tablet Oral Daily    fluconazole  200 mg Oral Weekly    insulin detemir U-100  15 Units Subcutaneous QHS    loperamide  4 mg Oral Daily    miconazole   Topical (Top) BID    pantoprazole  40 mg Oral Daily    SITagliptin phosphate  100 mg Oral Daily    sodium chloride 0.9%  10 mL Intravenous Q6H         INFUSIONS       DIAGNOSTIC TESTS  US Upper Extremity Veins Left   Final Result      No venous thrombosis identified.         Electronically signed by: Andrade Perez   Date:    03/18/2024   Time:    21:08      X-Ray Chest 1 View   Final Result      Left PICC tip overlies the mid SVC.         Electronically signed by: Oj Solomon   Date:    02/28/2024   Time:    14:29         EF   Date Value Ref Range Status   05/09/2023 60 % Final   07/18/2022 40 % Final        NUTRITION STATUS  Patient meets ASPEN criteria for   malnutrition of   per RD assessment as evidenced by:                       A minimum of two characteristics is recommended for diagnosis of either severe or non-severe malnutrition.     Case related differential diagnoses have been reviewed; assessment and plan has been documented. I have personally reviewed the labs and test results that are currently available; I have reviewed the patients medication list. I have reviewed the consulting providers recommendations. I have reviewed or attempted to review medical records based upon their availability.  All of the patient's and/or family's questions have been addressed and answered to the best of my ability.  I will continue to monitor closely and make adjustments to medical management as needed.  This document was created using M*Modal Fluency Direct.  Transcription errors may have been made.  Please  contact me if any questions may rise regarding documentation to clarify transcription.      ANTHONY Vazquez   Internal Medicine  Department of Hospital Medicine [facility]

## 2024-04-04 NOTE — PT/OT/SLP PROGRESS
Physical Therapy Treatment Note           Name: Antonio Galvan II    : 1965 (58 y.o.)  MRN: [id number]           TREATMENT SUMMARY AND RECOMMENDATIONS:    PT Received On: 24  PT Start Time: 1105     PT Stop Time: 1135  PT Total Time (min): 30 min     Subjective Assessment:   No complaints  Lethargic   x Awake, alert, cooperative  Uncooperative    Agitated  c/o pain    Appropriate  c/o fatigue    Confused x Treated at bedside     Emotionally labile  Treated in gym/dept.    Impulsive  Other:    Flat affect       Therapy Precautions:    Cognitive deficits  Spinal precautions    Collar - hard  Sternal precautions    Collar - soft   TLSO    Fall risk  LSO    Hip precautions - posterior  Knee immobilizer    Hip precautions - anterior  WBAT    Impaired communication  Partial weightbearing    Oxygen  TTWB    PEG tube  NWB    Visual deficits x Other: Wound Vac and Super pubic cath    Hearing deficits  x OK 'd to get in chair 2-3 hours per day       Treatment Objectives:     Mobility Training:   Assist level Comments    Bed mobility Total A Rolling side to side for tess pad placement and donning pants. Proper WC positioning completed with to reduce skin break down.    Transfer Total A Tess from bed>chair; x 2-3 person assistance for maintenance of cath, wound vac and B LE   Gait     Sit to stand transitions     Sitting balance     Standing balance      Wheelchair mobility     Car transfer              Therapeutic Exercise:   Exercise Sets Reps Comments                               Additional Comments:  Extra time needed for proper positioning once in WC.     Ordering a BeasyBoard slide board and will practice once in to reduce friction with transfers.      PT is recommending a WC re-eval for better cushion recommendations and fit of WC to best protect current wounds     PT to continue educating     Assessment: Patient tolerated session well , but still needing 2-3 person assistance for positioning  during tess.     PT Plan: Continue per POC  Revisions made to plan of care: No    GOALS:   Multidisciplinary Problems       Physical Therapy Goals          Problem: Physical Therapy    Goal Priority Disciplines Outcome Goal Variances Interventions   Physical Therapy Goal     PT, PT/OT Ongoing, Progressing     Description: Goals to be met by: Discharge     Pt to be seen for ROM tasks 1-2 x/day to ensure proper positioning can be obtained to overall reduce impacts of prolonged bed rest and skin break down    Progress pending healing stages of current wounds    Patient will increase functional independence with mobility by performin. Pt to tolerate PROM tasks to B UE and LE, while obtaining 25% improvement in range.   2. Sitting EOB to tolerance pending wound healing - with pt demonstrating normal BP- Goal Met  3. Progress to OOB into chair once able. - Met  4. Pt to tolerate up to 3 hours in power chair - Met  5. Caregivers to be able to use tess with 1-2 people.                          Skilled PT Minutes Provided: 25   Communication with Treatment Team:     Equipment recommendations:       At end of treatment, patient remained:   Up in chair    x Up in wheelchair in room    In bed    With alarm activated    Bed rails up   x Call bell in reach    x Family/friends present    Restraints secured properly    In bathroom with CNA/RN notified   x Nurse aware    In gym with therapist/tech    Other:

## 2024-04-04 NOTE — PT/OT/SLP PROGRESS
Physical Therapy Treatment Note           Name: Antonio Galvan II    : 1965 (58 y.o.)  MRN: [id number]           TREATMENT SUMMARY AND RECOMMENDATIONS:    PT Received On: 24  PT Start Time: 1405     PT Stop Time: 1425  PT Total Time (min): 20 min     Subjective Assessment:   No complaints  Lethargic   x Awake, alert, cooperative  Uncooperative    Agitated  c/o pain    Appropriate  c/o fatigue    Confused x Treated at bedside     Emotionally labile  Treated in gym/dept.    Impulsive  Other:    Flat affect       Therapy Precautions:    Cognitive deficits  Spinal precautions    Collar - hard  Sternal precautions    Collar - soft   TLSO    Fall risk  LSO    Hip precautions - posterior  Knee immobilizer    Hip precautions - anterior  WBAT    Impaired communication  Partial weightbearing    Oxygen  TTWB    PEG tube  NWB    Visual deficits x Other: Wound Vac and Super pubic cath    Hearing deficits  x OK 'd to get in chair 2-3 hours per day       Treatment Objectives:     Mobility Training:   Assist level Comments    Bed mobility Total A Rolling side to side for tess pad removal and doffing pants. Proper bed positioning completed with pt in supine using pillow for UE and LE to reduce skin break down.    Transfer Total A Tess from chair>bed; x 2-3 person assistance for maintenance of cath, wound vac and B LE   Gait     Sit to stand transitions     Sitting balance     Standing balance      Wheelchair mobility     Car transfer              Therapeutic Exercise:   Exercise Sets Reps Comments                               Additional Comments:  Extra time needed for proper positioning once back in bed.     PT to continue educating and preparing for DC next week.     Assessment: Patient tolerated session well , but still needing 2-3 person assistance for positioning during tess. Trying to find a way to slide board without increased friction to protect wounds since pt has to tf at home with 1 person  assistance.     PT Plan: Continue per POC  Revisions made to plan of care: No    GOALS:   Multidisciplinary Problems       Physical Therapy Goals          Problem: Physical Therapy    Goal Priority Disciplines Outcome Goal Variances Interventions   Physical Therapy Goal     PT, PT/OT Ongoing, Progressing     Description: Goals to be met by: Discharge     Pt to be seen for ROM tasks 1-2 x/day to ensure proper positioning can be obtained to overall reduce impacts of prolonged bed rest and skin break down    Progress pending healing stages of current wounds    Patient will increase functional independence with mobility by performin. Pt to tolerate PROM tasks to B UE and LE, while obtaining 25% improvement in range.   2. Sitting EOB to tolerance pending wound healing - with pt demonstrating normal BP- Goal Met  3. Progress to OOB into chair once able. - Met  4. Pt to tolerate up to 3 hours in power chair - Met  5. Caregivers to be able to use tess with 1-2 people.                          Skilled PT Minutes Provided: 15   Communication with Treatment Team:     Equipment recommendations:       At end of treatment, patient remained:   Up in chair     Up in wheelchair in room   x In bed    With alarm activated    Bed rails up   x Call bell in reach    x Family/friends present    Restraints secured properly    In bathroom with CNA/RN notified   x Nurse aware    In gym with therapist/tech    Other:

## 2024-04-04 NOTE — PROGRESS NOTES
04/04/24 1642   WOCN Assessment   WOCN Total Time (mins) 60   Visit Date 04/04/24   WOCN List wound vac   Procedure wound vac   Intervention changed        Altered Skin Integrity 01/12/23 1530 Sacral spine Full thickness tissue loss with exposed bone, tendon, or muscle. Often includes undermining and tunneling. May extend into muscle and/or supporting structures.   Date First Assessed/Time First Assessed: 01/12/23 1530   Altered Skin Integrity Present on Admission - Did Patient arrive to the hospital with altered skin?: yes  Location: Sacral spine  Description of Altered Skin Integrity: Full thickness tissue los...   Wound Image    Dressing Appearance Intact;Moist drainage   Drainage Amount Moderate   Drainage Characteristics/Odor Serosanguineous;No odor;Bleeding controlled   Appearance Red;Granulating;Tan;Slough;Fibrin;Other (see comments)  (connective tssue)   Tissue loss description Full thickness   Red (%), Wound Tissue Color 65 %   Yellow (%), Wound Tissue Color 35 %   Periwound Area Moist;Scar tissue   Wound Edges Defined   Wound Length (cm) 7.5 cm   Wound Width (cm) 7.7 cm   Wound Depth (cm) 3 cm   Wound Volume (cm^3) 173.25 cm^3   Wound Surface Area (cm^2) 57.75 cm^2   Care Cleansed with:;Antimicrobial agent;Wound cleanser   Dressing Changed;Foam;Transparent film  (vac dressing)   Periwound Care Hydrocolloid barrier applied;Dry periwound area maintained;Skin barrier film applied;Topical treatment applied  (mastisol)   Dressing Change Due 04/09/24        Incision/Site 02/27/24 1450 Right Buttocks lateral   Date First Assessed/Time First Assessed: 02/27/24 1450   Side: Right  Location: Buttocks  Orientation: lateral  Additional Comments: mesalt, quick clot, gauze, abdomen pad, and tape   Wound Image    Dressing Appearance Intact;Moist drainage   Drainage Amount Moderate   Drainage Characteristics/Odor Serosanguineous   Appearance Red;Granulating   Red (%), Wound Tissue Color 100 %   Periwound Area Scar  tissue;Moist   Wound Edges Defined   Wound Length (cm) 6.5 cm   Wound Width (cm) 12.3 cm   Wound Depth (cm) 1.5 cm   Wound Volume (cm^3) 119.925 cm^3   Wound Surface Area (cm^2) 79.95 cm^2   Undermining (depth (cm)/location) 12 o'clock to 2 o'clock / 4cm @ 1 o clock   Care Cleansed with:;Antimicrobial agent;Wound cleanser   Dressing Changed;Foam;Transparent film  (vac dressing)   Periwound Care Dry periwound area maintained;Skin barrier film applied;Topical treatment applied;Hydrocolloid barrier applied  (mastisol)   Dressing Change Due 04/09/24        Negative Pressure Wound Therapy  02/29/24 1227   Placement Date/Time: 02/29/24 1227   Location: Sacral Spine   NPWT Type Vacuum Therapy   Therapy Setting NPWT Continuous therapy   Pressure Setting NPWT 125 mmHg   Therapy Interventions NPWT Dressing changed;Seal intact   Sponges Inserted NPWT Black;4   Sponges Removed NPWT Black;4

## 2024-04-04 NOTE — NURSING
Nurses Note -- 4 Eyes      4/4/2024   12:24 AM      Skin assessed during: Daily Assessment      [] No Altered Skin Integrity Present    []Prevention Measures Documented      [x] Yes- Altered Skin Integrity Present or Discovered   [] LDA Added if Not in Epic (Describe Wound)   [x] New Altered Skin Integrity was Present on Admit and Documented in LDA   [] Wound Image Taken    Wound Care Consulted? No    Attending Nurse:  Consuelo Aburto LPN     Second RN/Staff Member:  Shivam Maza RN

## 2024-04-05 LAB
ANION GAP SERPL CALC-SCNC: 9 MEQ/L
BASOPHILS # BLD AUTO: 0.01 X10(3)/MCL
BASOPHILS NFR BLD AUTO: 0.2 %
BUN SERPL-MCNC: 23.7 MG/DL (ref 8.4–25.7)
CALCIUM SERPL-MCNC: 8.4 MG/DL (ref 8.4–10.2)
CHLORIDE SERPL-SCNC: 110 MMOL/L (ref 98–107)
CO2 SERPL-SCNC: 22 MMOL/L (ref 22–29)
CREAT SERPL-MCNC: 1.25 MG/DL (ref 0.73–1.18)
CREAT/UREA NIT SERPL: 19
EOSINOPHIL # BLD AUTO: 0.33 X10(3)/MCL (ref 0–0.9)
EOSINOPHIL NFR BLD AUTO: 6.3 %
ERYTHROCYTE [DISTWIDTH] IN BLOOD BY AUTOMATED COUNT: 18 % (ref 11.5–17)
GFR SERPLBLD CREATININE-BSD FMLA CKD-EPI: >60 MLS/MIN/1.73/M2
GLUCOSE SERPL-MCNC: 145 MG/DL (ref 74–100)
GLUCOSE SERPL-MCNC: 186 MG/DL (ref 70–110)
GLUCOSE SERPL-MCNC: 256 MG/DL (ref 70–110)
HCT VFR BLD AUTO: 25 % (ref 42–52)
HGB BLD-MCNC: 8 G/DL (ref 14–18)
IMM GRANULOCYTES # BLD AUTO: 0.03 X10(3)/MCL (ref 0–0.04)
IMM GRANULOCYTES NFR BLD AUTO: 0.6 %
LYMPHOCYTES # BLD AUTO: 1.72 X10(3)/MCL (ref 0.6–4.6)
LYMPHOCYTES NFR BLD AUTO: 33.1 %
MCH RBC QN AUTO: 27 PG (ref 27–31)
MCHC RBC AUTO-ENTMCNC: 32 G/DL (ref 33–36)
MCV RBC AUTO: 84.5 FL (ref 80–94)
MONOCYTES # BLD AUTO: 0.57 X10(3)/MCL (ref 0.1–1.3)
MONOCYTES NFR BLD AUTO: 11 %
NEUTROPHILS # BLD AUTO: 2.54 X10(3)/MCL (ref 2.1–9.2)
NEUTROPHILS NFR BLD AUTO: 48.8 %
PLATELET # BLD AUTO: 134 X10(3)/MCL (ref 130–400)
PMV BLD AUTO: 11 FL (ref 7.4–10.4)
POTASSIUM SERPL-SCNC: 4.6 MMOL/L (ref 3.5–5.1)
RBC # BLD AUTO: 2.96 X10(6)/MCL (ref 4.7–6.1)
SODIUM SERPL-SCNC: 141 MMOL/L (ref 136–145)
WBC # SPEC AUTO: 5.2 X10(3)/MCL (ref 4.5–11.5)

## 2024-04-05 PROCEDURE — 63600175 PHARM REV CODE 636 W HCPCS: Performed by: PHYSICIAN ASSISTANT

## 2024-04-05 PROCEDURE — 80048 BASIC METABOLIC PNL TOTAL CA: CPT | Performed by: PHYSICIAN ASSISTANT

## 2024-04-05 PROCEDURE — 25000003 PHARM REV CODE 250: Performed by: PHYSICIAN ASSISTANT

## 2024-04-05 PROCEDURE — 85025 COMPLETE CBC W/AUTO DIFF WBC: CPT | Performed by: PHYSICIAN ASSISTANT

## 2024-04-05 PROCEDURE — 11000004 HC SNF PRIVATE

## 2024-04-05 PROCEDURE — 94760 N-INVAS EAR/PLS OXIMETRY 1: CPT

## 2024-04-05 PROCEDURE — 99900035 HC TECH TIME PER 15 MIN (STAT)

## 2024-04-05 PROCEDURE — A4216 STERILE WATER/SALINE, 10 ML: HCPCS | Performed by: INTERNAL MEDICINE

## 2024-04-05 PROCEDURE — 63600175 PHARM REV CODE 636 W HCPCS: Performed by: INTERNAL MEDICINE

## 2024-04-05 PROCEDURE — 25000003 PHARM REV CODE 250: Performed by: STUDENT IN AN ORGANIZED HEALTH CARE EDUCATION/TRAINING PROGRAM

## 2024-04-05 PROCEDURE — 27000207 HC ISOLATION

## 2024-04-05 PROCEDURE — 63600175 PHARM REV CODE 636 W HCPCS: Performed by: STUDENT IN AN ORGANIZED HEALTH CARE EDUCATION/TRAINING PROGRAM

## 2024-04-05 PROCEDURE — 25000003 PHARM REV CODE 250: Performed by: INTERNAL MEDICINE

## 2024-04-05 PROCEDURE — 97530 THERAPEUTIC ACTIVITIES: CPT

## 2024-04-05 RX ADMIN — INSULIN DETEMIR 15 UNITS: 100 INJECTION, SOLUTION SUBCUTANEOUS at 10:04

## 2024-04-05 RX ADMIN — LOPERAMIDE HYDROCHLORIDE 4 MG: 2 CAPSULE ORAL at 09:04

## 2024-04-05 RX ADMIN — PANTOPRAZOLE SODIUM 40 MG: 40 TABLET, DELAYED RELEASE ORAL at 09:04

## 2024-04-05 RX ADMIN — HYDROCODONE BITARTRATE AND ACETAMINOPHEN 1 TABLET: 7.5; 325 TABLET ORAL at 09:04

## 2024-04-05 RX ADMIN — MICONAZOLE NITRATE: 20 CREAM TOPICAL at 09:04

## 2024-04-05 RX ADMIN — CETIRIZINE HYDROCHLORIDE 10 MG: 10 TABLET, FILM COATED ORAL at 09:04

## 2024-04-05 RX ADMIN — SODIUM CHLORIDE, PRESERVATIVE FREE 10 ML: 5 INJECTION INTRAVENOUS at 06:04

## 2024-04-05 RX ADMIN — SODIUM CHLORIDE, PRESERVATIVE FREE 10 ML: 5 INJECTION INTRAVENOUS at 05:04

## 2024-04-05 RX ADMIN — OXYCODONE HYDROCHLORIDE AND ACETAMINOPHEN 500 MG: 500 TABLET ORAL at 09:04

## 2024-04-05 RX ADMIN — COLLAGENASE SANTYL: 250 OINTMENT TOPICAL at 09:04

## 2024-04-05 RX ADMIN — SITAGLIPTIN 100 MG: 50 TABLET, FILM COATED ORAL at 09:04

## 2024-04-05 RX ADMIN — INSULIN ASPART 3 UNITS: 100 INJECTION, SOLUTION INTRAVENOUS; SUBCUTANEOUS at 10:04

## 2024-04-05 RX ADMIN — DILTIAZEM HYDROCHLORIDE 120 MG: 120 CAPSULE, COATED, EXTENDED RELEASE ORAL at 09:04

## 2024-04-05 RX ADMIN — ONDANSETRON 4 MG: 2 INJECTION INTRAMUSCULAR; INTRAVENOUS at 02:04

## 2024-04-05 RX ADMIN — ACETAMINOPHEN 650 MG: 325 TABLET ORAL at 10:04

## 2024-04-05 RX ADMIN — FERROUS SULFATE TAB 325 MG (65 MG ELEMENTAL FE) 1 EACH: 325 (65 FE) TAB at 09:04

## 2024-04-05 NOTE — PROGRESS NOTES
[facility]  [facility] MEDICINE ~ PROGRESS NOTE  CHIEF COMPLAINT     Hospital follow up for nonhealing wound    HOSPITAL COURSE     58-year-old man with quadriplegic spinal paralysis and numerous decubitus ulcers who is followed by wound care is brought in today due to fever. He had wound cultures done 3 days ago that have grown Proteus mirabilis and Klebsiella pneumoniae. Sensitivities are not complete. The wounds were noted to have significant odor and heavy green drainage. In addition the patient had some nausea and vomiting and home health noted that his blood pressure was dipping. He was advised to come to the emergency room. Wound cultures were drawn from the right hip wound. His significant other reports that when the wound is pressed above pus comes out. Both Klebsiella and Proteus maybe treated with fluoroquinolones which are both IV and oral. The patient's significant other preferred if the patient came home so that she could attend to his wounds however the patient expressed a desire to be admitted.  Patient did undergo a debridement with Dr. Ruvalcaba [id number].  He required 2u pRBC 02/28/2024 and 1u pRBC on 02/29/2024 with improvement of hemoglobin. Wound culture revealed VRE, Proteus, Pseudomonas, Klebsiella. H&H and platelets continue to decrease, suspect Zyvox induced myelosuppression. Appropriate antibiotics (Tobramycin and Zyvox) were initiated on 03/02/2024 therefore he has received 26 days of antibiotic therapy. Patient is noted to have chronic osteomyelitis therefore he would only require 2 weeks of antibiotic therapy for skin and soft tissue infection. Antibiotics discontinued on 03/28/2024 due to risk vs benefits of continuing antibiotic therapy in the setting of myelosuppression and continue with aggressive wound care. Patient did require the addition of Diltiazem for atrial flutter rate control which was subsequently changed to Coreg due to hypotension.  Patient  did not tolerate correct well due to continued increased heart rate therefore he was transitioned back to diltiazem.  Patient did have to receive another unit of PRBCs on 04/01/2024 due to decreasing hemoglobin and continued tachycardia.    Today:  No reported complaints today.  Patient ordered a slide board with a disc on it allowing the patient to sit on the disc and the disc itself slice rather than sliding his bottom across the board, should be delivered on Saturday.      OBJECTIVE/PHYSICAL EXAM     VITAL SIGNS (MOST RECENT):  Temp: 97.5 °F (36.4 °C) (04/04/24 1933)  Pulse: 86 (04/04/24 1933)  Resp: 18 (04/04/24 1933)  BP: 123/80 (04/04/24 1933)  SpO2: 97 % (04/05/24 0755) VITAL SIGNS (24 HOUR RANGE):  Temp:  [97.5 °F (36.4 °C)] 97.5 °F (36.4 °C)  Pulse:  [86-95] 86  Resp:  [18-19] 18  SpO2:  [97 %-98 %] 97 %  BP: (123)/(80) 123/80     GENERAL: In no acute distress, afebrile  HEENT:  PERRLA  CHEST: Clear to auscultation bilaterally  HEART: S1, S2, no appreciable murmur  ABDOMEN: Soft, nontender, BS +  MSK: Warm, no lower extremity edema, no clubbing or cyanosis  NEUROLOGIC: Alert and oriented x4  INTEGUMENTARY:  See media for wounds    ASSESSMENT/PLAN     Nonhealing chronic wound   VRE, Proteus, Pseudomonas, Klebsiella   Chronic osteomyelitis   S/p debridement with Dr. Jean on 02/27/2024  Zyvox and Tobramycin 03/02/2024-03/28/2024  History of chronic osteomyelitis - only needs 2 weeks of antibiotic therapy for acute skin and soft tissue infection  Discontinue antibiotics 03/28/2024 due to risk vs benefits of continuing antibiotic therapy in the setting of myelosuppression and continue with aggressive wound care  Wound VAC and wound care - ordering home wound vac   Appreciate dietary recommendations for supplements     Atrial flutter  Hypertension/Hypotension   Diltiazem started 3/16/2024-03/28/2024 and required multiple adjustments due to hypotension and tachycardia changed to Coreg  No improvement with  Coreg, diltiazem resumed once again 3/31 and transitioned to long acting on 04/02/2024  No AC 2/2 patient request as he has has prior episodes of acute blood loss from wounds - aspirin d/c 02/28/2024  HR improved after blood transfusion 04/01/2024  Hypertensive episodes are associated with pain    Post op anemia   Thrombocytopenia   Myelosuppression 2/2 Zyvox   H&H improved s/p 2u pRBC 02/28/2024, 1u pRBC on 02/29/2024, 1u pRBC on 04/01/2024  No anticoagulation at this time - aspirin d/c 02/28/2024  Peripheral smear without significant findings      Hyperglycemia/hypoglycemia in setting of DM  A1c 02/23/2024 8.5%  Reports taking 80u Levemir BID  Continue home Sitagliptin  Continue titrating insulin regimen as needed    GISSEL - improving   Hyperkalemia - resolved   24 hour urine potassium shows adequate excretion    Left upper extremity swelling - resolved   Ultrasound without DVT     Hx of: Neurogenic bladder, Quadriplegia  Changed suprapubic catheter 03/31/2024     DVT prophylaxis:  SCDs, history of bleeding in the recent past    Anticipated discharge and disposition: home with [facility], awaiting home wound VAC approval, plan for discharge home on 04/09/2024 __________________________________________________________________________    LABS/MICRO/MEDS/DIAGNOSTICS     LABS  Recent Labs     04/05/24  0548      K 4.6   CHLORIDE 110*   CO2 22   BUN 23.7   CREATININE 1.25*   GLUCOSE 145*   CALCIUM 8.4     Recent Labs     04/05/24  0548   WBC 5.20   RBC 2.96*   HCT 25.0*   MCV 84.5        MICROBIOLOGY  Microbiology Results (last 7 days)       ** No results found for the last 168 hours. **             MEDICATIONS   acetaminophen  650 mg Oral QHS    ascorbic acid (vitamin C)  500 mg Oral Daily    cetirizine  10 mg Oral Daily    collagenase   Topical (Top) Daily    diltiaZEM  120 mg Oral Daily    ferrous sulfate  1 tablet Oral Daily    fluconazole  200 mg Oral Weekly    insulin detemir U-100  15 Units Subcutaneous QHS     loperamide  4 mg Oral Daily    miconazole   Topical (Top) BID    pantoprazole  40 mg Oral Daily    SITagliptin phosphate  100 mg Oral Daily    sodium chloride 0.9%  10 mL Intravenous Q6H         INFUSIONS       DIAGNOSTIC TESTS  US Upper Extremity Veins Left   Final Result      No venous thrombosis identified.         Electronically signed by: Andrade Perez   Date:    03/18/2024   Time:    21:08      X-Ray Chest 1 View   Final Result      Left PICC tip overlies the mid SVC.         Electronically signed by: Oj Solomon   Date:    02/28/2024   Time:    14:29         EF   Date Value Ref Range Status   05/09/2023 60 % Final   07/18/2022 40 % Final        NUTRITION STATUS  Patient meets ASPEN criteria for   malnutrition of   per RD assessment as evidenced by:                       A minimum of two characteristics is recommended for diagnosis of either severe or non-severe malnutrition.     Case related differential diagnoses have been reviewed; assessment and plan has been documented. I have personally reviewed the labs and test results that are currently available; I have reviewed the patients medication list. I have reviewed the consulting providers recommendations. I have reviewed or attempted to review medical records based upon their availability.  All of the patient's and/or family's questions have been addressed and answered to the best of my ability.  I will continue to monitor closely and make adjustments to medical management as needed.  This document was created using M*Modal Fluency Direct.  Transcription errors may have been made.  Please contact me if any questions may rise regarding documentation to clarify transcription.      ANTHONY Vazquez   Internal Medicine  Department of Hospital Medicine [facility] - [facility] Surgical [facility]

## 2024-04-05 NOTE — PLAN OF CARE
Problem: Adult Inpatient Plan of Care  Goal: Plan of Care Review  Outcome: Ongoing, Progressing  Goal: Patient-Specific Goal (Individualized)  Outcome: Ongoing, Progressing  Flowsheets (Taken 4/5/2024 1650)  Anxieties, Fears or Concerns: none expressed  Individualized Care Needs: monitor CBGs, monitor vitals and wound vac, wound care  Goal: Absence of Hospital-Acquired Illness or Injury  Outcome: Ongoing, Progressing  Intervention: Identify and Manage Fall Risk  Flowsheets (Taken 4/5/2024 1650)  Safety Promotion/Fall Prevention:   assistive device/personal item within reach   bed alarm set   nonskid shoes/socks when out of bed   side rails raised x 2  Goal: Optimal Comfort and Wellbeing  Outcome: Ongoing, Progressing  Goal: Readiness for Transition of Care  Outcome: Ongoing, Progressing     Problem: Diabetes Comorbidity  Goal: Blood Glucose Level Within Targeted Range  Outcome: Ongoing, Progressing  Intervention: Monitor and Manage Glycemia  Flowsheets (Taken 4/5/2024 1650)  Glycemic Management: blood glucose monitored     Problem: Skin Injury Risk Increased  Goal: Skin Health and Integrity  Outcome: Ongoing, Progressing  Intervention: Optimize Skin Protection  Flowsheets (Taken 4/5/2024 1650)  Pressure Reduction Techniques: frequent weight shift encouraged  Head of Bed (HOB) Positioning: HOB at 20-30 degrees     Problem: Impaired Wound Healing  Goal: Optimal Wound Healing  Outcome: Ongoing, Progressing  Intervention: Promote Wound Healing  Flowsheets (Taken 4/5/2024 1650)  Activity Management: Rolling - L1     Problem: Fall Injury Risk  Goal: Absence of Fall and Fall-Related Injury  Outcome: Ongoing, Progressing  Intervention: Identify and Manage Contributors  Flowsheets (Taken 4/5/2024 1650)  Self-Care Promotion:   independence encouraged   safe use of adaptive equipment encouraged  Medication Review/Management: medications reviewed  Intervention: Promote Injury-Free Environment  Flowsheets (Taken 4/5/2024  1650)  Safety Promotion/Fall Prevention:   assistive device/personal item within reach   bed alarm set   nonskid shoes/socks when out of bed   side rails raised x 2     Problem: Infection  Goal: Absence of Infection Signs and Symptoms  Outcome: Ongoing, Progressing  Intervention: Prevent or Manage Infection  Flowsheets (Taken 4/5/2024 1650)  Infection Management: aseptic technique maintained  Isolation Precautions: protective

## 2024-04-05 NOTE — PROGRESS NOTES
Inpatient Nutrition Evaluation    Admit Date: 2/26/2024   Total duration of encounter: 36 days   Patient Age: 58 y.o.    Nutrition Recommendation/Prescription     Continue regular diet. Adjust diet, per patient request. Requesting sugar free.  2. Continue Arginaid 1 pk BID for wound healing  provides 30 kcal and 4.5 gm protein, per serving. 3. Continue banatrol banana flakes 1pk TID  which is 40 kcal per serving. Continue ascorbic acid 500 mg PO daily 4. Weekly weights 5. Monitor intake, wt, labs, medications     Nutrition Assessment     Chart Review    Reason Seen: continuous nutrition monitoring, length of stay, and follow-up    Malnutrition Screening Tool Results   Have you recently lost weight without trying?: No  Have you been eating poorly because of a decreased appetite?: No   MST Score: 0   Diagnosis:  Infected wounds/sacrum, abd, feet  Klebsiella pneumoniae, Pseudomonas aeruginosa, Proteus mirabilis  DM,   Hypotension  Hyperglycemia  VRE  A-fib       Relevant Medical History:   Cardiac arrest         7/2022    Chronic skin ulcer      DM (diabetes mellitus)      Infected decubitus ulcer      Lymphedema of leg      Neurogenic bladder      Obesity, unspecified      Pressure ulcer of heel      Pressure ulcer of right foot, stage 3      Pressure ulcer of right ischium      Quadriplegia      Quadriplegic spinal paralysis        Scheduled Medications:  acetaminophen, 650 mg, QHS  ascorbic acid (vitamin C), 500 mg, Daily  cetirizine, 10 mg, Daily  collagenase, , Daily  diltiaZEM, 120 mg, Daily  ferrous sulfate, 1 tablet, Daily  fluconazole, 200 mg, Weekly  insulin detemir U-100, 15 Units, QHS  loperamide, 4 mg, Daily  miconazole, , BID  pantoprazole, 40 mg, Daily  SITagliptin phosphate, 100 mg, Daily  sodium chloride 0.9%, 10 mL, Q6H    Continuous Infusions:   PRN Medications: 0.9%  NaCl infusion (for blood administration), 0.9%  NaCl infusion (for blood administration), 0.9%  NaCl infusion (for blood  administration), sodium chloride 0.9%, acetaminophen, albuterol, benzonatate, bisacodyL, budesonide, calcium carbonate, dextrose 10%, dextrose 10%, dextrose 10%, dextrose 10%, diphenhydrAMINE, glucagon (human recombinant), glucagon (human recombinant), glucose, glucose, glucose, glucose, hydrALAZINE, HYDROcodone-acetaminophen, HYDROcodone-acetaminophen, hydrOXYzine pamoate, insulin aspart U-100, loperamide, melatonin, metoprolol, ondansetron, simethicone, Flushing PICC/Midline Protocol **AND** sodium chloride 0.9% **AND** sodium chloride 0.9%, zolpidem    Recent Labs   Lab 03/27/24  0840 03/27/24  1422 03/28/24  0523 03/29/24  0526 04/01/24  0530 04/02/24  0535     --  138 143 141 140   K 5.6* 5.2* 5.1 4.9 5.0 4.9   CALCIUM 8.5  --  8.5 8.6 8.4 8.2*   CHLORIDE 110*  --  108* 113* 111* 110*   CO2 19*  --  19* 20* 22 22   BUN 37.3*  --  35.0* 37.7* 32.3* 29.5*   CREATININE 1.35*  --  1.57* 1.32* 1.28* 1.36*   EGFRNORACEVR >60  --  51 >60 >60 >60   GLUCOSE 118*  --  108* 112* 118* 142*   WBC  --   --  3.47* 3.39* 4.19* 4.56   HGB  --   --  7.5* 8.1* 7.2* 8.0*   HCT  --   --  24.2* 26.5* 23.3* 25.3*     Nutrition Orders:  Diet Adult Regular  Dietary nutrition supplements Arginaid - Any flavor; BID,Dietary nutrition supplements Banatrol Plus Banana Flakes; TID    Appetite/Oral Intake: fair/50-75% of meals  Factors Affecting Nutritional Intake: none identified  Food/Orthodoxy/Cultural Preferences:   Food Allergies: none reported  Last Bowel Movement: 03/31/24  Wound(s):     Altered Skin Integrity 05/06/22 1140 Right Heel  Full thickness tissue loss. Subcutaneous fat may be visible but bone, tendon or muscle are not exposed-Tissue loss description: Full thickness       Altered Skin Integrity 01/12/23 1530 Sacral spine Full thickness tissue loss with exposed bone, tendon, or muscle. Often includes undermining and tunneling. May extend into muscle and/or supporting structures.-Tissue loss description: Full thickness     "   Altered Skin Integrity 08/01/23 1534 Left posterior Ankle Full thickness tissue loss. Base is covered by slough and/or eschar in the wound bed-Tissue loss description: Not applicable       Altered Skin Integrity 08/01/23 1535 Left anterior Ankle Other (comment) Full thickness tissue loss. Base is covered by slough and/or eschar in the wound bed-Tissue loss description: Not applicable     Comments    4/5/24: Pt's family reports intake is good. Spoke with wound care nurse this am and wound care stated wounds improved.  Discussed beverage with family. Pt overral enjoying new menu. Pt stated.     4/2/24: Pt reports intake is good. Pt with family during rounds. Pt reports wounds are improving. Wound care nurse reports all sites remain stable on 4/1/24.   Pt consumes outside foods. PA reports issues with bowel, which as improved, which was reported from mother on 4/2/24. Pt had multiple BM over the weekend. Banatrol Plus was ordered. Will monitor.     Anthropometrics    Height: 5' 7" (170.2 cm), Height Method: Stated  Last Weight: 92 kg (202 lb 13.2 oz) (04/01/24 0617), Weight Method: Bed Scale  BMI (Calculated): 32.6  BMI Classification: obese grade I (BMI 30-34.9)        Ideal Body Weight (IBW), Male: 148 lb     % Ideal Body Weight, Male (lb): 139.86 %      Kcal Needs: 1840 kcal (20 kcal/kg/CBW)  Protein Needs: 138 gm (1.5 gm/kg/CBW)  Fluid Needs: 1840 mL (1 mL/kcal)                    Usual Weight Provided By: unable to obtain usual weight    Wt Readings from Last 5 Encounters:   04/01/24 92 kg (202 lb 13.2 oz)   02/27/24 93.9 kg (207 lb 0.2 oz)   02/23/24 93.9 kg (207 lb)   08/16/23 106.4 kg (234 lb 9.6 oz)   08/15/23 113.4 kg (250 lb)     Weight Change(s) Since Admission: -1.9 kg wt loss.   Wt Readings from Last 1 Encounters:   04/01/24 0617 92 kg (202 lb 13.2 oz)   03/18/24 0500 94.4 kg (208 lb 1.8 oz)   03/04/24 0500 95 kg (209 lb 7 oz)   02/26/24 1500 93.9 kg (207 lb)   Admit Weight: 93.9 kg (207 lb) (02/26/24 " 1500), Weight Method: Bed Scale (Used bed weight from admission 2/23/24 as his bed does not have a scale)    Patient Education     Not applicable.    Nutrition Goals & Monitoring     Dietitian will monitor: food and beverage intake, weight, weight change, electrolyte/renal panel, glucose/endocrine profile, and gastrointestinal profile    Nutrition Risk/Follow-Up: low (follow-up in 5-7 days)  Patients assigned 'low nutrition risk' status do not qualify for a full nutritional assessment but will be monitored and re-evaluated in a 5-7 day time period. Please consult if re-evaluation needed sooner.

## 2024-04-05 NOTE — PLAN OF CARE
Spoke with [employer] for the wound vac this afternoon. They have received all of the paperwork and are processing the order. They could not give me a date for delivery but will call with one when processed.

## 2024-04-05 NOTE — PT/OT/SLP PROGRESS
Physical Therapy Treatment Note           Name: Antonio Galvan II    : 1965 (58 y.o.)  MRN: [id number]           TREATMENT SUMMARY AND RECOMMENDATIONS:    PT Received On: 24  PT Start Time: 1435     PT Stop Time: 1500  PT Total Time (min): 25 min     Subjective Assessment:   No complaints  Lethargic   x Awake, alert, cooperative  Uncooperative    Agitated  c/o pain    Appropriate  c/o fatigue    Confused x Treated at bedside     Emotionally labile  Treated in gym/dept.    Impulsive  Other:    Flat affect       Therapy Precautions:    Cognitive deficits  Spinal precautions    Collar - hard  Sternal precautions    Collar - soft   TLSO    Fall risk  LSO    Hip precautions - posterior  Knee immobilizer    Hip precautions - anterior  WBAT    Impaired communication  Partial weightbearing    Oxygen  TTWB    PEG tube  NWB    Visual deficits x Other: Wound Vac and Super pubic cath    Hearing deficits  x OK 'd to get in chair 2-3 hours per day       Treatment Objectives:     Mobility Training:   Assist level Comments    Bed mobility Total A Rolling side to side for tess pad removal and doffing pants. Proper bed positioning completed with pt in supine using pillow for UE and LE to reduce skin break down.    Transfer Total A Tess from chair>bed; x 2-3 person assistance for maintenance of cath, wound vac and B LE   Gait     Sit to stand transitions     Sitting balance     Standing balance      Wheelchair mobility     Car transfer              Therapeutic Exercise:   Exercise Sets Reps Comments                               Additional Comments:  PT to continue educating and preparing for DC next week.     Will complete trials with new equipment next week in prep for DC    PT assisted with changing bed linens prior to getting back in bed.     Assessment: Patient tolerated session well , but still needing 2-3 person assistance for positioning during tess. Trying to find a way to slide board without  increased friction to protect wounds since pt has to tf at home with 1 person assistance.     PT Plan: Continue per POC  Revisions made to plan of care: No    GOALS:   Multidisciplinary Problems       Physical Therapy Goals          Problem: Physical Therapy    Goal Priority Disciplines Outcome Goal Variances Interventions   Physical Therapy Goal     PT, PT/OT Ongoing, Progressing     Description: Goals to be met by: Discharge     Pt to be seen for ROM tasks 1-2 x/day to ensure proper positioning can be obtained to overall reduce impacts of prolonged bed rest and skin break down    Progress pending healing stages of current wounds    Patient will increase functional independence with mobility by performin. Pt to tolerate PROM tasks to B UE and LE, while obtaining 25% improvement in range.   2. Sitting EOB to tolerance pending wound healing - with pt demonstrating normal BP- Goal Met  3. Progress to OOB into chair once able. - Met  4. Pt to tolerate up to 3 hours in power chair - Met  5. Caregivers to be able to use tess with 1-2 people.                          Skilled PT Minutes Provided: 20   Communication with Treatment Team:     Equipment recommendations:       At end of treatment, patient remained:   Up in chair     Up in wheelchair in room   x In bed    With alarm activated    Bed rails up   x Call bell in reach    x Family/friends present    Restraints secured properly    In bathroom with CNA/RN notified   x Nurse aware    In gym with therapist/tech    Other:

## 2024-04-05 NOTE — PLAN OF CARE
Spoke with [employer] for wound vac. They requested further information. Progress notes faxed. Will halina again for date of delivery

## 2024-04-05 NOTE — PT/OT/SLP PROGRESS
Physical Therapy Treatment Note           Name: Antonio Galvan II    : 1965 (58 y.o.)  MRN: [id number]           TREATMENT SUMMARY AND RECOMMENDATIONS:    PT Received On: 24  PT Start Time: 1100     PT Stop Time: 1122  PT Total Time (min): 22 min     Subjective Assessment:   No complaints  Lethargic   x Awake, alert, cooperative  Uncooperative    Agitated  c/o pain    Appropriate  c/o fatigue    Confused x Treated at bedside     Emotionally labile  Treated in gym/dept.    Impulsive  Other:    Flat affect       Therapy Precautions:    Cognitive deficits  Spinal precautions    Collar - hard  Sternal precautions    Collar - soft   TLSO    Fall risk  LSO    Hip precautions - posterior  Knee immobilizer    Hip precautions - anterior  WBAT    Impaired communication  Partial weightbearing    Oxygen  TTWB    PEG tube  NWB    Visual deficits x Other: Wound Vac and Super pubic cath    Hearing deficits  x OK 'd to get in chair 2-3 hours per day       Treatment Objectives:     Mobility Training:   Assist level Comments    Bed mobility Total A Rolling side to side for tess pad placement and donning pants. Proper WC positioning completed with to reduce skin break down.    Transfer Total A Tess from bed>chair; x 2-3 person assistance for maintenance of cath, wound vac and B LE   Gait     Sit to stand transitions     Sitting balance     Standing balance      Wheelchair mobility     Car transfer              Therapeutic Exercise:   Exercise Sets Reps Comments                               Additional Comments:  Extra time needed for proper positioning once in WC.     Ordering a BeasyBoard slide board and will practice once in to reduce friction with transfers.      PT is recommending a WC re-eval for better cushion recommendations and fit of WC to best protect current wounds     PT to continue educating     Assessment: Patient tolerated session well , but still needing 2-3 person assistance for positioning  during tess.     PT Plan: Continue per POC  Revisions made to plan of care: No    GOALS:   Multidisciplinary Problems       Physical Therapy Goals          Problem: Physical Therapy    Goal Priority Disciplines Outcome Goal Variances Interventions   Physical Therapy Goal     PT, PT/OT Ongoing, Progressing     Description: Goals to be met by: Discharge     Pt to be seen for ROM tasks 1-2 x/day to ensure proper positioning can be obtained to overall reduce impacts of prolonged bed rest and skin break down    Progress pending healing stages of current wounds    Patient will increase functional independence with mobility by performin. Pt to tolerate PROM tasks to B UE and LE, while obtaining 25% improvement in range.   2. Sitting EOB to tolerance pending wound healing - with pt demonstrating normal BP- Goal Met  3. Progress to OOB into chair once able. - Met  4. Pt to tolerate up to 3 hours in power chair - Met  5. Caregivers to be able to use tess with 1-2 people.                          Skilled PT Minutes Provided: 20   Communication with Treatment Team:     Equipment recommendations:       At end of treatment, patient remained:   Up in chair    x Up in wheelchair in room    In bed    With alarm activated    Bed rails up   x Call bell in reach    x Family/friends present    Restraints secured properly    In bathroom with CNA/RN notified   x Nurse aware    In gym with therapist/tech    Other:

## 2024-04-05 NOTE — NURSING
"Pt has dexcom. Pt reported blood sugar of 163. Pt refused coverage of 1 unit.    Turned pt to left. Asked pt about Q2 thru the night. Pt asked not to be turned while asleep.     23:25 pt is awake. Asked pt if ready to turn, pt stated, "no, I'm alright".     03:30 pt awake, asked pt if up to turning, pt stated, "no, I'm good".    06:48 Dexcom 135.   "

## 2024-04-06 LAB
GLUCOSE SERPL-MCNC: 143 MG/DL (ref 70–110)
GLUCOSE SERPL-MCNC: 169 MG/DL (ref 70–110)
GLUCOSE SERPL-MCNC: 170 MG/DL (ref 70–110)
GLUCOSE SERPL-MCNC: 207 MG/DL (ref 70–110)

## 2024-04-06 PROCEDURE — A4216 STERILE WATER/SALINE, 10 ML: HCPCS | Performed by: INTERNAL MEDICINE

## 2024-04-06 PROCEDURE — 63600175 PHARM REV CODE 636 W HCPCS: Performed by: INTERNAL MEDICINE

## 2024-04-06 PROCEDURE — 25000003 PHARM REV CODE 250: Performed by: PHYSICIAN ASSISTANT

## 2024-04-06 PROCEDURE — 27000207 HC ISOLATION

## 2024-04-06 PROCEDURE — 25000003 PHARM REV CODE 250: Performed by: INTERNAL MEDICINE

## 2024-04-06 PROCEDURE — 94760 N-INVAS EAR/PLS OXIMETRY 1: CPT

## 2024-04-06 PROCEDURE — 97530 THERAPEUTIC ACTIVITIES: CPT

## 2024-04-06 PROCEDURE — 99900035 HC TECH TIME PER 15 MIN (STAT)

## 2024-04-06 PROCEDURE — 25000003 PHARM REV CODE 250: Performed by: STUDENT IN AN ORGANIZED HEALTH CARE EDUCATION/TRAINING PROGRAM

## 2024-04-06 PROCEDURE — 11000004 HC SNF PRIVATE

## 2024-04-06 PROCEDURE — 63600175 PHARM REV CODE 636 W HCPCS: Performed by: STUDENT IN AN ORGANIZED HEALTH CARE EDUCATION/TRAINING PROGRAM

## 2024-04-06 RX ADMIN — COLLAGENASE SANTYL: 250 OINTMENT TOPICAL at 09:04

## 2024-04-06 RX ADMIN — LOPERAMIDE HYDROCHLORIDE 4 MG: 2 CAPSULE ORAL at 09:04

## 2024-04-06 RX ADMIN — MICONAZOLE NITRATE: 20 CREAM TOPICAL at 09:04

## 2024-04-06 RX ADMIN — CETIRIZINE HYDROCHLORIDE 10 MG: 10 TABLET, FILM COATED ORAL at 09:04

## 2024-04-06 RX ADMIN — PANTOPRAZOLE SODIUM 40 MG: 40 TABLET, DELAYED RELEASE ORAL at 09:04

## 2024-04-06 RX ADMIN — SODIUM CHLORIDE, PRESERVATIVE FREE 10 ML: 5 INJECTION INTRAVENOUS at 12:04

## 2024-04-06 RX ADMIN — OXYCODONE HYDROCHLORIDE AND ACETAMINOPHEN 500 MG: 500 TABLET ORAL at 09:04

## 2024-04-06 RX ADMIN — SITAGLIPTIN 100 MG: 50 TABLET, FILM COATED ORAL at 09:04

## 2024-04-06 RX ADMIN — SODIUM CHLORIDE, PRESERVATIVE FREE 10 ML: 5 INJECTION INTRAVENOUS at 06:04

## 2024-04-06 RX ADMIN — ACETAMINOPHEN 650 MG: 325 TABLET ORAL at 09:04

## 2024-04-06 RX ADMIN — INSULIN DETEMIR 15 UNITS: 100 INJECTION, SOLUTION SUBCUTANEOUS at 09:04

## 2024-04-06 RX ADMIN — INSULIN ASPART 4 UNITS: 100 INJECTION, SOLUTION INTRAVENOUS; SUBCUTANEOUS at 04:04

## 2024-04-06 RX ADMIN — DILTIAZEM HYDROCHLORIDE 120 MG: 120 CAPSULE, COATED, EXTENDED RELEASE ORAL at 09:04

## 2024-04-06 RX ADMIN — FERROUS SULFATE TAB 325 MG (65 MG ELEMENTAL FE) 1 EACH: 325 (65 FE) TAB at 09:04

## 2024-04-06 NOTE — PT/OT/SLP PROGRESS
Physical Therapy Treatment Note           Name: Antonio Galvan II    : 1965 (58 y.o.)  MRN: [id number]           TREATMENT SUMMARY AND RECOMMENDATIONS:    PT Received On: 24  PT Start Time: 845     PT Stop Time: 905  PT Total Time (min): 20 min     Subjective Assessment:   No complaints  Lethargic   x Awake, alert, cooperative  Uncooperative    Agitated  c/o pain    Appropriate  c/o fatigue    Confused x Treated at bedside     Emotionally labile  Treated in gym/dept.    Impulsive  Other:    Flat affect       Therapy Precautions:    Cognitive deficits  Spinal precautions    Collar - hard  Sternal precautions    Collar - soft   TLSO    Fall risk  LSO    Hip precautions - posterior  Knee immobilizer    Hip precautions - anterior  WBAT    Impaired communication  Partial weightbearing    Oxygen  TTWB    PEG tube  NWB    Visual deficits x Other: Wound Vac and Super pubic cath    Hearing deficits  x OK 'd to get in chair 2-3 hours per day       Treatment Objectives:     Mobility Training:   Assist level Comments    Bed mobility     Transfer     Gait     Sit to stand transitions     Sitting balance     Standing balance      Wheelchair mobility     Car transfer              Therapeutic Exercise:   Exercise Sets Reps Comments   B LE PROM                          Additional Comments:     Assessment: Patient tolerated session fair with PROM performed to B UE and LE per pt's available movement due to joint contractures and hypertonicity throughout     PT Plan: Continue per POC  Revisions made to plan of care: No    GOALS:   Multidisciplinary Problems       Physical Therapy Goals          Problem: Physical Therapy    Goal Priority Disciplines Outcome Goal Variances Interventions   Physical Therapy Goal     PT, PT/OT Ongoing, Progressing     Description: Goals to be met by: Discharge     Pt to be seen for ROM tasks 1-2 x/day to ensure proper positioning can be obtained to overall reduce impacts of  prolonged bed rest and skin break down    Progress pending healing stages of current wounds    Patient will increase functional independence with mobility by performin. Pt to tolerate PROM tasks to B UE and LE, while obtaining 25% improvement in range.   2. Sitting EOB to tolerance pending wound healing - with pt demonstrating normal BP- Goal Met  3. Progress to OOB into chair once able. - Met  4. Pt to tolerate up to 3 hours in power chair - Met  5. Caregivers to be able to use tess with 1-2 people.                          Skilled PT Minutes Provided: 20   Communication with Treatment Team:     Equipment recommendations:       At end of treatment, patient remained:   Up in chair     Up in wheelchair in room   x In bed    With alarm activated    Bed rails up   x Call bell in reach    x Family/friends present    Restraints secured properly    In bathroom with CNA/RN notified   x Nurse aware    In gym with therapist/tech    Other:

## 2024-04-06 NOTE — PLAN OF CARE
Problem: Adult Inpatient Plan of Care  Goal: Plan of Care Review  Outcome: Ongoing, Progressing  Flowsheets (Taken 4/6/2024 1224)  Plan of Care Reviewed With:   patient   caregiver  Goal: Patient-Specific Goal (Individualized)  Outcome: Ongoing, Progressing  Flowsheets (Taken 4/6/2024 1224)  Anxieties, Fears or Concerns: none expressed  Individualized Care Needs: monitor CBGs, monitor vitals, maintain seal on wound vac, daily wound care, turn q2  Goal: Absence of Hospital-Acquired Illness or Injury  Outcome: Ongoing, Progressing  Intervention: Identify and Manage Fall Risk  Flowsheets (Taken 4/6/2024 1224)  Safety Promotion/Fall Prevention:   assistive device/personal item within reach   bed alarm set   family to remain at bedside   instructed to call staff for mobility   room near unit station  Intervention: Prevent Skin Injury  Flowsheets (Taken 4/6/2024 1224)  Body Position: sitting up in bed  Skin Protection:   adhesive use limited   incontinence pads utilized   transparent dressing maintained   tubing/devices free from skin contact   skin sealant/moisture barrier applied  Intervention: Prevent and Manage VTE (Venous Thromboembolism) Risk  Flowsheets (Taken 4/6/2024 1224)  Activity Management: Rolling - L1  VTE Prevention/Management:   bleeding precations maintained   fluids promoted   bleeding risk assessed  Range of Motion: active ROM (range of motion) encouraged  Intervention: Prevent Infection  Flowsheets (Taken 4/6/2024 1224)  Infection Prevention:   hand hygiene promoted   equipment surfaces disinfected   cohorting utilized  Goal: Optimal Comfort and Wellbeing  Outcome: Ongoing, Progressing  Intervention: Monitor Pain and Promote Comfort  Flowsheets (Taken 4/6/2024 1224)  Pain Management Interventions:   care clustered   quiet environment facilitated   relaxation techniques promoted  Intervention: Provide Person-Centered Care  Flowsheets (Taken 4/6/2024 1224)  Trust Relationship/Rapport:   care  explained   choices provided   questions encouraged  Goal: Readiness for Transition of Care  Outcome: Ongoing, Progressing  Intervention: Mutually Develop Transition Plan  Flowsheets (Taken 4/6/2024 1224)  Communicated SADE with patient/caregiver: Date not available/Unable to determine     Problem: Diabetes Comorbidity  Goal: Blood Glucose Level Within Targeted Range  Outcome: Ongoing, Progressing  Intervention: Monitor and Manage Glycemia  Flowsheets (Taken 4/6/2024 1224)  Glycemic Management:   blood glucose monitored   oral hydration promoted     Problem: Skin Injury Risk Increased  Goal: Skin Health and Integrity  Outcome: Ongoing, Progressing  Intervention: Optimize Skin Protection  Flowsheets (Taken 4/6/2024 1224)  Pressure Reduction Techniques:   positioned off wounds   pressure points protected   weight shift assistance provided   sit time limited to 2 hours   heels elevated off bed   frequent weight shift encouraged  Pressure Reduction Devices: positioning supports utilized  Skin Protection:   adhesive use limited   incontinence pads utilized   transparent dressing maintained   tubing/devices free from skin contact   skin sealant/moisture barrier applied  Head of Bed (HOB) Positioning: HOB at 20-30 degrees  Intervention: Promote and Optimize Oral Intake  Flowsheets (Taken 4/6/2024 1224)  Oral Nutrition Promotion:   calorie-dense foods provided   calorie-dense liquids provided   safe use of adaptive equipment encouraged     Problem: Impaired Wound Healing  Goal: Optimal Wound Healing  Outcome: Ongoing, Progressing  Intervention: Promote Wound Healing  Flowsheets (Taken 4/6/2024 1224)  Oral Nutrition Promotion:   calorie-dense foods provided   calorie-dense liquids provided   safe use of adaptive equipment encouraged  Sleep/Rest Enhancement:   awakenings minimized   relaxation techniques promoted  Activity Management: Rolling - L1  Pain Management Interventions:   care clustered   quiet environment  facilitated   relaxation techniques promoted     Problem: Fall Injury Risk  Goal: Absence of Fall and Fall-Related Injury  Outcome: Ongoing, Progressing  Intervention: Identify and Manage Contributors  Flowsheets (Taken 4/6/2024 1224)  Self-Care Promotion:   safe use of adaptive equipment encouraged   independence encouraged   BADL personal objects within reach  Medication Review/Management: medications reviewed  Intervention: Promote Injury-Free Environment  Flowsheets (Taken 4/6/2024 1224)  Safety Promotion/Fall Prevention:   assistive device/personal item within reach   bed alarm set   family to remain at bedside   instructed to call staff for mobility   room near unit station     Problem: Infection  Goal: Absence of Infection Signs and Symptoms  Outcome: Ongoing, Progressing  Intervention: Prevent or Manage Infection  Flowsheets (Taken 4/6/2024 1224)  Fever Reduction/Comfort Measures:   lightweight bedding   lightweight clothing  Infection Management: aseptic technique maintained

## 2024-04-06 NOTE — PROGRESS NOTES
[facility]  [facility] MEDICINE ~ PROGRESS NOTE  CHIEF COMPLAINT     Hospital follow up for nonhealing wound    HOSPITAL COURSE     58-year-old man with quadriplegic spinal paralysis and numerous decubitus ulcers who is followed by wound care is brought in today due to fever. He had wound cultures done 3 days ago that have grown Proteus mirabilis and Klebsiella pneumoniae. Sensitivities are not complete. The wounds were noted to have significant odor and heavy green drainage. In addition the patient had some nausea and vomiting and home health noted that his blood pressure was dipping. He was advised to come to the emergency room. Wound cultures were drawn from the right hip wound. His significant other reports that when the wound is pressed above pus comes out. Both Klebsiella and Proteus maybe treated with fluoroquinolones which are both IV and oral. The patient's significant other preferred if the patient came home so that she could attend to his wounds however the patient expressed a desire to be admitted.  Patient did undergo a debridement with Dr. Ruvalcaba [phone].  He required 2u pRBC 02/28/2024 and 1u pRBC on 02/29/2024 with improvement of hemoglobin. Wound culture revealed VRE, Proteus, Pseudomonas, Klebsiella. H&H and platelets continue to decrease, suspect Zyvox induced myelosuppression. Appropriate antibiotics (Tobramycin and Zyvox) were initiated on 03/02/2024 therefore he has received 26 days of antibiotic therapy. Patient is noted to have chronic osteomyelitis therefore he would only require 2 weeks of antibiotic therapy for skin and soft tissue infection. Antibiotics discontinued on 03/28/2024 due to risk vs benefits of continuing antibiotic therapy in the setting of myelosuppression and continue with aggressive wound care. Patient did require the addition of Diltiazem for atrial flutter rate control which was subsequently changed to Coreg due to hypotension.  Patient  did not tolerate correct well due to continued increased heart rate therefore he was transitioned back to diltiazem.  Patient did have to receive another unit of PRBCs on 04/01/2024 due to decreasing hemoglobin and continued tachycardia.    Today:  No reported complaints today.    OBJECTIVE/PHYSICAL EXAM     VITAL SIGNS (MOST RECENT):  Temp: 98.5 °F (36.9 °C) (04/05/24 1900)  Pulse: 65 (04/05/24 0941)  Resp: 18 (04/05/24 1900)  BP: 133/80 (04/05/24 1900)  SpO2: 98 % (04/05/24 1923) VITAL SIGNS (24 HOUR RANGE):  Temp:  [98.5 °F (36.9 °C)] 98.5 °F (36.9 °C)  Pulse:  [65] 65  Resp:  [18] 18  SpO2:  [98 %] 98 %  BP: (109-133)/(60-80) 133/80     GENERAL: In no acute distress, afebrile  HEENT:  PERRLA  CHEST: Clear to auscultation bilaterally  HEART: S1, S2, no appreciable murmur  ABDOMEN: Soft, nontender, BS +  MSK: Warm, no lower extremity edema, no clubbing or cyanosis  NEUROLOGIC: Alert and oriented x4  INTEGUMENTARY:  See media for wounds    ASSESSMENT/PLAN     Nonhealing chronic wound   VRE, Proteus, Pseudomonas, Klebsiella   Chronic osteomyelitis   S/p debridement with Dr. Jean on 02/27/2024  Zyvox and Tobramycin 03/02/2024-03/28/2024  History of chronic osteomyelitis - only needs 2 weeks of antibiotic therapy for acute skin and soft tissue infection  Discontinue antibiotics 03/28/2024 due to risk vs benefits of continuing antibiotic therapy in the setting of myelosuppression and continue with aggressive wound care  Wound VAC and wound care - ordering home wound vac   Appreciate dietary recommendations for supplements     Atrial flutter  Hypertension/Hypotension   Diltiazem started 3/16/2024-03/28/2024 and required multiple adjustments due to hypotension and tachycardia changed to Coreg  No improvement with Coreg, diltiazem resumed once again 3/31 and transitioned to long acting on 04/02/2024  No AC 2/2 patient request as he has has prior episodes of acute blood loss from wounds - aspirin d/c 02/28/2024  HR  improved after blood transfusion 04/01/2024  Hypertensive episodes are associated with pain    Post op anemia   Thrombocytopenia   Myelosuppression 2/2 Zyvox   H&H improved s/p 2u pRBC 02/28/2024, 1u pRBC on 02/29/2024, 1u pRBC on 04/01/2024  No anticoagulation at this time - aspirin d/c 02/28/2024  Peripheral smear without significant findings      Hyperglycemia/hypoglycemia in setting of DM  A1c 02/23/2024 8.5%  Reports taking 80u Levemir BID  Continue home Sitagliptin  Continue titrating insulin regimen as needed    GISSEL - improving   Hyperkalemia - resolved   24 hour urine potassium shows adequate excretion    Left upper extremity swelling - resolved   Ultrasound without DVT     Hx of: Neurogenic bladder, Quadriplegia  Changed suprapubic catheter 03/31/2024     DVT prophylaxis:  SCDs, history of bleeding in the recent past    Anticipated discharge and disposition: home with [facility], awaiting home wound VAC approval, plan for discharge home on 04/09/2024 __________________________________________________________________________    LABS/MICRO/MEDS/DIAGNOSTICS     LABS  Recent Labs     04/05/24  0548      K 4.6   CHLORIDE 110*   CO2 22   BUN 23.7   CREATININE 1.25*   GLUCOSE 145*   CALCIUM 8.4     Recent Labs     04/05/24  0548   WBC 5.20   RBC 2.96*   HCT 25.0*   MCV 84.5        MICROBIOLOGY  Microbiology Results (last 7 days)       ** No results found for the last 168 hours. **             MEDICATIONS   acetaminophen  650 mg Oral QHS    ascorbic acid (vitamin C)  500 mg Oral Daily    cetirizine  10 mg Oral Daily    collagenase   Topical (Top) Daily    diltiaZEM  120 mg Oral Daily    ferrous sulfate  1 tablet Oral Daily    fluconazole  200 mg Oral Weekly    insulin detemir U-100  15 Units Subcutaneous QHS    loperamide  4 mg Oral Daily    miconazole   Topical (Top) BID    pantoprazole  40 mg Oral Daily    SITagliptin phosphate  100 mg Oral Daily    sodium chloride 0.9%  10 mL Intravenous Q6H          INFUSIONS       DIAGNOSTIC TESTS  US Upper Extremity Veins Left   Final Result      No venous thrombosis identified.         Electronically signed by: Andrade Perez   Date:    03/18/2024   Time:    21:08      X-Ray Chest 1 View   Final Result      Left PICC tip overlies the mid SVC.         Electronically signed by: Oj Solomon   Date:    02/28/2024   Time:    14:29         EF   Date Value Ref Range Status   05/09/2023 60 % Final   07/18/2022 40 % Final        NUTRITION STATUS  Patient meets ASPEN criteria for   malnutrition of   per RD assessment as evidenced by:                       A minimum of two characteristics is recommended for diagnosis of either severe or non-severe malnutrition.     Case related differential diagnoses have been reviewed; assessment and plan has been documented. I have personally reviewed the labs and test results that are currently available; I have reviewed the patients medication list. I have reviewed the consulting providers recommendations. I have reviewed or attempted to review medical records based upon their availability.  All of the patient's and/or family's questions have been addressed and answered to the best of my ability.  I will continue to monitor closely and make adjustments to medical management as needed.  This document was created using M*Modal Fluency Direct.  Transcription errors may have been made.  Please contact me if any questions may rise regarding documentation to clarify transcription.      Jeanette Mann MD   Internal Medicine  Department of Hospital Medicine [facility]

## 2024-04-07 LAB
APPEARANCE UR: CLEAR
BACTERIA #/AREA URNS AUTO: ABNORMAL /HPF
BILIRUB UR QL STRIP.AUTO: NEGATIVE
COLOR UR AUTO: YELLOW
GLUCOSE UR QL STRIP.AUTO: NEGATIVE
KETONES UR QL STRIP.AUTO: NEGATIVE
LEUKOCYTE ESTERASE UR QL STRIP.AUTO: ABNORMAL
NITRITE UR QL STRIP.AUTO: NEGATIVE
PH UR STRIP.AUTO: 7 [PH]
PROT UR QL STRIP.AUTO: 100
RBC #/AREA URNS AUTO: ABNORMAL /HPF
RBC UR QL AUTO: ABNORMAL
SP GR UR STRIP.AUTO: 1.02 (ref 1–1.03)
SQUAMOUS #/AREA URNS AUTO: ABNORMAL /HPF
UROBILINOGEN UR STRIP-ACNC: 0.2
WBC #/AREA URNS AUTO: ABNORMAL /HPF
YEAST URNS QL MICRO: ABNORMAL /HPF

## 2024-04-07 PROCEDURE — 25000003 PHARM REV CODE 250: Performed by: INTERNAL MEDICINE

## 2024-04-07 PROCEDURE — 63600175 PHARM REV CODE 636 W HCPCS: Performed by: STUDENT IN AN ORGANIZED HEALTH CARE EDUCATION/TRAINING PROGRAM

## 2024-04-07 PROCEDURE — 99900035 HC TECH TIME PER 15 MIN (STAT)

## 2024-04-07 PROCEDURE — 81003 URINALYSIS AUTO W/O SCOPE: CPT | Performed by: INTERNAL MEDICINE

## 2024-04-07 PROCEDURE — 11000004 HC SNF PRIVATE

## 2024-04-07 PROCEDURE — A4216 STERILE WATER/SALINE, 10 ML: HCPCS | Performed by: INTERNAL MEDICINE

## 2024-04-07 PROCEDURE — 94760 N-INVAS EAR/PLS OXIMETRY 1: CPT

## 2024-04-07 PROCEDURE — 27000207 HC ISOLATION

## 2024-04-07 PROCEDURE — 87181 SC STD AGAR DILUTION PER AGT: CPT | Performed by: INTERNAL MEDICINE

## 2024-04-07 PROCEDURE — 87086 URINE CULTURE/COLONY COUNT: CPT | Performed by: INTERNAL MEDICINE

## 2024-04-07 PROCEDURE — 63600175 PHARM REV CODE 636 W HCPCS: Performed by: INTERNAL MEDICINE

## 2024-04-07 PROCEDURE — 25000003 PHARM REV CODE 250: Performed by: STUDENT IN AN ORGANIZED HEALTH CARE EDUCATION/TRAINING PROGRAM

## 2024-04-07 PROCEDURE — 25000003 PHARM REV CODE 250: Performed by: PHYSICIAN ASSISTANT

## 2024-04-07 RX ORDER — CLINDAMYCIN HYDROCHLORIDE 150 MG/1
150 CAPSULE ORAL EVERY 6 HOURS
Status: DISCONTINUED | OUTPATIENT
Start: 2024-04-07 | End: 2024-04-09 | Stop reason: HOSPADM

## 2024-04-07 RX ADMIN — INSULIN DETEMIR 15 UNITS: 100 INJECTION, SOLUTION SUBCUTANEOUS at 08:04

## 2024-04-07 RX ADMIN — SODIUM CHLORIDE, PRESERVATIVE FREE 10 ML: 5 INJECTION INTRAVENOUS at 12:04

## 2024-04-07 RX ADMIN — CLINDAMYCIN HYDROCHLORIDE 150 MG: 150 CAPSULE ORAL at 11:04

## 2024-04-07 RX ADMIN — SODIUM CHLORIDE, PRESERVATIVE FREE 10 ML: 5 INJECTION INTRAVENOUS at 05:04

## 2024-04-07 RX ADMIN — PANTOPRAZOLE SODIUM 40 MG: 40 TABLET, DELAYED RELEASE ORAL at 08:04

## 2024-04-07 RX ADMIN — COLLAGENASE SANTYL: 250 OINTMENT TOPICAL at 08:04

## 2024-04-07 RX ADMIN — SITAGLIPTIN 100 MG: 50 TABLET, FILM COATED ORAL at 08:04

## 2024-04-07 RX ADMIN — CLINDAMYCIN HYDROCHLORIDE 150 MG: 150 CAPSULE ORAL at 05:04

## 2024-04-07 RX ADMIN — SODIUM CHLORIDE, PRESERVATIVE FREE 10 ML: 5 INJECTION INTRAVENOUS at 06:04

## 2024-04-07 RX ADMIN — INSULIN ASPART 4 UNITS: 100 INJECTION, SOLUTION INTRAVENOUS; SUBCUTANEOUS at 06:04

## 2024-04-07 RX ADMIN — INSULIN ASPART 1 UNITS: 100 INJECTION, SOLUTION INTRAVENOUS; SUBCUTANEOUS at 08:04

## 2024-04-07 RX ADMIN — MICONAZOLE NITRATE: 20 CREAM TOPICAL at 08:04

## 2024-04-07 RX ADMIN — CETIRIZINE HYDROCHLORIDE 10 MG: 10 TABLET, FILM COATED ORAL at 08:04

## 2024-04-07 RX ADMIN — LOPERAMIDE HYDROCHLORIDE 4 MG: 2 CAPSULE ORAL at 08:04

## 2024-04-07 RX ADMIN — FERROUS SULFATE TAB 325 MG (65 MG ELEMENTAL FE) 1 EACH: 325 (65 FE) TAB at 08:04

## 2024-04-07 RX ADMIN — DILTIAZEM HYDROCHLORIDE 120 MG: 120 CAPSULE, COATED, EXTENDED RELEASE ORAL at 08:04

## 2024-04-07 RX ADMIN — ACETAMINOPHEN 650 MG: 325 TABLET ORAL at 08:04

## 2024-04-07 RX ADMIN — OXYCODONE HYDROCHLORIDE AND ACETAMINOPHEN 500 MG: 500 TABLET ORAL at 08:04

## 2024-04-07 RX ADMIN — SODIUM CHLORIDE, PRESERVATIVE FREE 10 ML: 5 INJECTION INTRAVENOUS at 11:04

## 2024-04-07 NOTE — PLAN OF CARE
[facility]  Discharge Reassessment    Primary Care Provider: Jennifer Fernando NP    Expected Discharge Date: 4/9/2024    Reassessment (most recent)       Discharge Reassessment - 04/07/24 1540          Discharge Reassessment    Assessment Type Discharge Planning Reassessment     Did the patient's condition or plan change since previous assessment? No     Discharge Plan discussed with: Caregiver;Parent(s)     Name(s) and Number(s) Judy mother      Communicated SADE with patient/caregiver Date not available/Unable to determine     Discharge Plan A Home with family;Home Health     DME Needed Upon Discharge  dressing device     Transition of Care Barriers None     Why the patient remains in the hospital Requires continued medical care        Post-Acute Status    Post-Acute Authorization Home Health     Home Health Status Pending medical clearance/testing     Hospital Resources/Appts/Education Provided Provided patient/caregiver with written discharge plan information     Discharge Delays None known at this time

## 2024-04-07 NOTE — PROGRESS NOTES
[facility]  [facility] MEDICINE ~ PROGRESS NOTE  CHIEF COMPLAINT     Hospital follow up for nonhealing wound    HOSPITAL COURSE     58-year-old man with quadriplegic spinal paralysis and numerous decubitus ulcers who is followed by wound care is brought in today due to fever. He had wound cultures done 3 days ago that have grown Proteus mirabilis and Klebsiella pneumoniae. Sensitivities are not complete. The wounds were noted to have significant odor and heavy green drainage. In addition the patient had some nausea and vomiting and home health noted that his blood pressure was dipping. He was advised to come to the emergency room. Wound cultures were drawn from the right hip wound. His significant other reports that when the wound is pressed above pus comes out. Both Klebsiella and Proteus maybe treated with fluoroquinolones which are both IV and oral. The patient's significant other preferred if the patient came home so that she could attend to his wounds however the patient expressed a desire to be admitted.  Patient did undergo a debridement with Dr. Ruvalcaba [id number].  He required 2u pRBC 02/28/2024 and 1u pRBC on 02/29/2024 with improvement of hemoglobin. Wound culture revealed VRE, Proteus, Pseudomonas, Klebsiella. H&H and platelets continue to decrease, suspect Zyvox induced myelosuppression. Appropriate antibiotics (Tobramycin and Zyvox) were initiated on 03/02/2024 therefore he has received 26 days of antibiotic therapy. Patient is noted to have chronic osteomyelitis therefore he would only require 2 weeks of antibiotic therapy for skin and soft tissue infection. Antibiotics discontinued on 03/28/2024 due to risk vs benefits of continuing antibiotic therapy in the setting of myelosuppression and continue with aggressive wound care. Patient did require the addition of Diltiazem for atrial flutter rate control which was subsequently changed to Coreg due to hypotension.  Patient  did not tolerate correct well due to continued increased heart rate therefore he was transitioned back to diltiazem.  Patient did have to receive another unit of PRBCs on 04/01/2024 due to decreasing hemoglobin and continued tachycardia.    Today:  Patient concerned with his temperature of a 100.0° last night however this is actually not a fever. I will get a CXR and UA, I don't think we need to restart ABX. We can discuss wound with wound care in am. Encourage IS.   OBJECTIVE/PHYSICAL EXAM     VITAL SIGNS (MOST RECENT):  Temp: 98.3 °F (36.8 °C) (04/07/24 0000)  Pulse: 74 (04/06/24 2127)  Resp: 19 (04/06/24 2127)  BP: (!) 142/82 (04/06/24 2127)  SpO2: 97 % (04/06/24 2127) VITAL SIGNS (24 HOUR RANGE):  Temp:  [98.3 °F (36.8 °C)-100 °F (37.8 °C)] 98.3 °F (36.8 °C)  Pulse:  [64-88] 74  Resp:  [18-19] 19  SpO2:  [97 %-99 %] 97 %  BP: (142-156)/(82-83) 142/82     GENERAL: In no acute distress, afebrile  HEENT:  PERRLA  CHEST: Clear to auscultation bilaterally  HEART: S1, S2, no appreciable murmur  ABDOMEN: Soft, nontender, BS +  MSK: Warm, no lower extremity edema, no clubbing or cyanosis  NEUROLOGIC: Alert and oriented x4  INTEGUMENTARY:  See media for wounds    ASSESSMENT/PLAN     Nonhealing chronic wound   VRE, Proteus, Pseudomonas, Klebsiella   Chronic osteomyelitis   S/p debridement with Dr. Jean on 02/27/2024  Zyvox and Tobramycin 03/02/2024-03/28/2024  History of chronic osteomyelitis - only needs 2 weeks of antibiotic therapy for acute skin and soft tissue infection  Discontinue antibiotics 03/28/2024 due to risk vs benefits of continuing antibiotic therapy in the setting of myelosuppression and continue with aggressive wound care  Wound VAC and wound care - ordering home wound vac   Appreciate dietary recommendations for supplements     Atrial flutter  Hypertension/Hypotension   Diltiazem started 3/16/2024-03/28/2024 and required multiple adjustments due to hypotension and tachycardia changed to Coreg  No  improvement with Coreg, diltiazem resumed once again 3/31 and transitioned to long acting on 04/02/2024  No AC 2/2 patient request as he has has prior episodes of acute blood loss from wounds - aspirin d/c 02/28/2024  HR improved after blood transfusion 04/01/2024  Hypertensive episodes are associated with pain    Post op anemia   Thrombocytopenia   Myelosuppression 2/2 Zyvox   H&H improved s/p 2u pRBC 02/28/2024, 1u pRBC on 02/29/2024, 1u pRBC on 04/01/2024  No anticoagulation at this time - aspirin d/c 02/28/2024  Peripheral smear without significant findings      Hyperglycemia/hypoglycemia in setting of DM  A1c 02/23/2024 8.5%  Reports taking 80u Levemir BID  Continue home Sitagliptin  Continue titrating insulin regimen as needed    GISSEL - improving   Hyperkalemia - resolved   24 hour urine potassium shows adequate excretion    Left upper extremity swelling - resolved   Ultrasound without DVT     Hx of: Neurogenic bladder, Quadriplegia  Changed suprapubic catheter 03/31/2024     DVT prophylaxis:  SCDs, history of bleeding in the recent past    Anticipated discharge and disposition: home with [facility], awaiting home wound VAC approval, plan for discharge home on 04/09/2024 __________________________________________________________________________    LABS/MICRO/MEDS/DIAGNOSTICS     LABS  Recent Labs     04/05/24  0548      K 4.6   CHLORIDE 110*   CO2 22   BUN 23.7   CREATININE 1.25*   GLUCOSE 145*   CALCIUM 8.4     Recent Labs     04/05/24  0548   WBC 5.20   RBC 2.96*   HCT 25.0*   MCV 84.5        MICROBIOLOGY  Microbiology Results (last 7 days)       ** No results found for the last 168 hours. **             MEDICATIONS   acetaminophen  650 mg Oral QHS    ascorbic acid (vitamin C)  500 mg Oral Daily    cetirizine  10 mg Oral Daily    collagenase   Topical (Top) Daily    diltiaZEM  120 mg Oral Daily    ferrous sulfate  1 tablet Oral Daily    fluconazole  200 mg Oral Weekly    insulin detemir U-100  15 Units  Subcutaneous QHS    loperamide  4 mg Oral Daily    miconazole   Topical (Top) BID    pantoprazole  40 mg Oral Daily    SITagliptin phosphate  100 mg Oral Daily    sodium chloride 0.9%  10 mL Intravenous Q6H         INFUSIONS       DIAGNOSTIC TESTS  US Upper Extremity Veins Left   Final Result      No venous thrombosis identified.         Electronically signed by: Andrade Perez   Date:    03/18/2024   Time:    21:08      X-Ray Chest 1 View   Final Result      Left PICC tip overlies the mid SVC.         Electronically signed by: Oj Solomon   Date:    02/28/2024   Time:    14:29      X-Ray Chest 1 View    (Results Pending)      EF   Date Value Ref Range Status   05/09/2023 60 % Final   07/18/2022 40 % Final        NUTRITION STATUS  Patient meets ASPEN criteria for   malnutrition of   per RD assessment as evidenced by:                       A minimum of two characteristics is recommended for diagnosis of either severe or non-severe malnutrition.     Case related differential diagnoses have been reviewed; assessment and plan has been documented. I have personally reviewed the labs and test results that are currently available; I have reviewed the patients medication list. I have reviewed the consulting providers recommendations. I have reviewed or attempted to review medical records based upon their availability.  All of the patient's and/or family's questions have been addressed and answered to the best of my ability.  I will continue to monitor closely and make adjustments to medical management as needed.  This document was created using M*Modal Fluency Direct.  Transcription errors may have been made.  Please contact me if any questions may rise regarding documentation to clarify transcription.      Jeanette Mann MD   Internal Medicine  Department of Hospital Medicine [facility]

## 2024-04-07 NOTE — PLAN OF CARE
Problem: Adult Inpatient Plan of Care  Goal: Plan of Care Review  Outcome: Ongoing, Progressing  Flowsheets (Taken 4/7/2024 1054)  Plan of Care Reviewed With:   patient   caregiver  Goal: Patient-Specific Goal (Individualized)  Outcome: Ongoing, Progressing  Flowsheets (Taken 4/7/2024 1054)  Anxieties, Fears or Concerns: none expressed at this time  Individualized Care Needs: monitor CBGS, monitor vitals, maintain seal on wound vac, daily wound care, turn q2, monitor for s/s of worsening infection  Goal: Absence of Hospital-Acquired Illness or Injury  Outcome: Ongoing, Progressing  Intervention: Identify and Manage Fall Risk  Flowsheets (Taken 4/7/2024 1054)  Safety Promotion/Fall Prevention:   assistive device/personal item within reach   family to remain at bedside   nonskid shoes/socks when out of bed   side rails raised x 2  Intervention: Prevent Skin Injury  Flowsheets (Taken 4/7/2024 1054)  Body Position: sitting up in bed  Skin Protection:   adhesive use limited   incontinence pads utilized   tubing/devices free from skin contact  Intervention: Prevent and Manage VTE (Venous Thromboembolism) Risk  Flowsheets (Taken 4/7/2024 1054)  Activity Management: Patient unable to perform activities  VTE Prevention/Management:   bleeding risk assessed   bleeding precations maintained   fluids promoted  Range of Motion: ROM (range of motion) performed  Intervention: Prevent Infection  Flowsheets (Taken 4/7/2024 1054)  Infection Prevention:   cohorting utilized   environmental surveillance performed   equipment surfaces disinfected   hand hygiene promoted   single patient room provided   rest/sleep promoted   personal protective equipment utilized  Goal: Optimal Comfort and Wellbeing  Outcome: Ongoing, Progressing  Intervention: Monitor Pain and Promote Comfort  Flowsheets (Taken 4/7/2024 1054)  Pain Management Interventions:   care clustered   pillow support provided   position adjusted   relaxation techniques  promoted   quiet environment facilitated  Intervention: Provide Person-Centered Care  Flowsheets (Taken 4/7/2024 1054)  Trust Relationship/Rapport:   care explained   questions encouraged   choices provided   reassurance provided   emotional support provided   thoughts/feelings acknowledged   empathic listening provided   questions answered  Goal: Readiness for Transition of Care  Outcome: Ongoing, Progressing  Intervention: Mutually Develop Transition Plan  Flowsheets (Taken 4/7/2024 1054)  Communicated SADE with patient/caregiver: Date not available/Unable to determine     Problem: Diabetes Comorbidity  Goal: Blood Glucose Level Within Targeted Range  Outcome: Ongoing, Progressing  Intervention: Monitor and Manage Glycemia  Flowsheets (Taken 4/7/2024 1054)  Glycemic Management:   blood glucose monitored   supplemental insulin given     Problem: Skin Injury Risk Increased  Goal: Skin Health and Integrity  Outcome: Ongoing, Progressing  Intervention: Optimize Skin Protection  Flowsheets (Taken 4/7/2024 1054)  Pressure Reduction Techniques:   positioned off wounds   heels elevated off bed   pressure points protected   weight shift assistance provided  Pressure Reduction Devices: positioning supports utilized  Skin Protection:   adhesive use limited   incontinence pads utilized   tubing/devices free from skin contact  Head of Bed (HOB) Positioning: HOB at 30-45 degrees  Intervention: Promote and Optimize Oral Intake  Flowsheets (Taken 4/7/2024 1054)  Oral Nutrition Promotion:   calorie-dense liquids provided   calorie-dense foods provided   safe use of adaptive equipment encouraged     Problem: Impaired Wound Healing  Goal: Optimal Wound Healing  Outcome: Ongoing, Progressing  Intervention: Promote Wound Healing  Flowsheets (Taken 4/7/2024 1054)  Oral Nutrition Promotion:   calorie-dense liquids provided   calorie-dense foods provided   safe use of adaptive equipment encouraged  Sleep/Rest Enhancement:   awakenings  minimized   consistent schedule promoted   regular sleep/rest pattern promoted  Activity Management: Patient unable to perform activities  Pain Management Interventions:   care clustered   pillow support provided   position adjusted   relaxation techniques promoted   quiet environment facilitated     Problem: Fall Injury Risk  Goal: Absence of Fall and Fall-Related Injury  Outcome: Ongoing, Progressing  Intervention: Identify and Manage Contributors  Flowsheets (Taken 4/7/2024 1054)  Self-Care Promotion:   independence encouraged   BADL personal objects within reach   BADL personal routines maintained   meal set-up provided   safe use of adaptive equipment encouraged  Medication Review/Management:   medications reviewed   high-risk medications identified  Intervention: Promote Injury-Free Environment  Flowsheets (Taken 4/7/2024 1054)  Safety Promotion/Fall Prevention:   assistive device/personal item within reach   family to remain at bedside   nonskid shoes/socks when out of bed   side rails raised x 2     Problem: Infection  Goal: Absence of Infection Signs and Symptoms  Outcome: Ongoing, Progressing  Intervention: Prevent or Manage Infection  Flowsheets (Taken 4/7/2024 1054)  Fever Reduction/Comfort Measures:   lightweight bedding   lightweight clothing  Infection Management: aseptic technique maintained  Isolation Precautions:   precautions maintained   contact

## 2024-04-07 NOTE — PLAN OF CARE
Problem: Adult Inpatient Plan of Care  Goal: Plan of Care Review  Outcome: Ongoing, Progressing  Flowsheets (Taken 4/6/2024 2000)  Plan of Care Reviewed With:   patient   caregiver  Goal: Patient-Specific Goal (Individualized)  Outcome: Ongoing, Progressing  Flowsheets (Taken 4/6/2024 2000)  Anxieties, Fears or Concerns: none verbalized  Individualized Care Needs: monitor CBGs, monitor vitals, maintain seal on wound vac, daily wound care, turn q2  Goal: Absence of Hospital-Acquired Illness or Injury  Outcome: Ongoing, Progressing  Intervention: Prevent Skin Injury  Flowsheets (Taken 4/6/2024 2000)  Skin Protection:   adhesive use limited   tubing/devices free from skin contact   incontinence pads utilized   transparent dressing maintained   skin sealant/moisture barrier applied  Intervention: Prevent and Manage VTE (Venous Thromboembolism) Risk  Flowsheets (Taken 4/6/2024 2000)  VTE Prevention/Management:   bleeding precations maintained   bleeding risk assessed   fluids promoted  Range of Motion: active ROM (range of motion) encouraged  Intervention: Prevent Infection  Flowsheets (Taken 4/6/2024 2000)  Infection Prevention:   cohorting utilized   environmental surveillance performed   equipment surfaces disinfected   rest/sleep promoted   hand hygiene promoted   single patient room provided   personal protective equipment utilized     Problem: Diabetes Comorbidity  Goal: Blood Glucose Level Within Targeted Range  Outcome: Ongoing, Progressing  Intervention: Monitor and Manage Glycemia  Flowsheets (Taken 4/6/2024 2000)  Glycemic Management:   blood glucose monitored   oral hydration promoted

## 2024-04-08 LAB
ALBUMIN SERPL-MCNC: 2.4 G/DL (ref 3.5–5)
ALBUMIN/GLOB SERPL: 0.8 RATIO (ref 1.1–2)
ALP SERPL-CCNC: 78 UNIT/L (ref 40–150)
ALT SERPL-CCNC: 11 UNIT/L (ref 0–55)
AST SERPL-CCNC: 6 UNIT/L (ref 5–34)
BASOPHILS # BLD AUTO: 0.01 X10(3)/MCL
BASOPHILS NFR BLD AUTO: 0.2 %
BILIRUB SERPL-MCNC: 0.3 MG/DL
BUN SERPL-MCNC: 23.7 MG/DL (ref 8.4–25.7)
CALCIUM SERPL-MCNC: 8.3 MG/DL (ref 8.4–10.2)
CHLORIDE SERPL-SCNC: 109 MMOL/L (ref 98–107)
CO2 SERPL-SCNC: 21 MMOL/L (ref 22–29)
CREAT SERPL-MCNC: 1.57 MG/DL (ref 0.73–1.18)
EOSINOPHIL # BLD AUTO: 0.33 X10(3)/MCL (ref 0–0.9)
EOSINOPHIL NFR BLD AUTO: 6.4 %
ERYTHROCYTE [DISTWIDTH] IN BLOOD BY AUTOMATED COUNT: 17.7 % (ref 11.5–17)
GFR SERPLBLD CREATININE-BSD FMLA CKD-EPI: 51 MLS/MIN/1.73/M2
GLOBULIN SER-MCNC: 3.1 GM/DL (ref 2.4–3.5)
GLUCOSE SERPL-MCNC: 140 MG/DL (ref 70–110)
GLUCOSE SERPL-MCNC: 206 MG/DL (ref 74–100)
GLUCOSE SERPL-MCNC: 219 MG/DL (ref 70–110)
GLUCOSE SERPL-MCNC: 227 MG/DL (ref 70–110)
HCT VFR BLD AUTO: 26.3 % (ref 42–52)
HGB BLD-MCNC: 8.1 G/DL (ref 14–18)
IMM GRANULOCYTES # BLD AUTO: 0.02 X10(3)/MCL (ref 0–0.04)
IMM GRANULOCYTES NFR BLD AUTO: 0.4 %
LYMPHOCYTES # BLD AUTO: 1.4 X10(3)/MCL (ref 0.6–4.6)
LYMPHOCYTES NFR BLD AUTO: 27.1 %
MAGNESIUM SERPL-MCNC: 1.6 MG/DL (ref 1.6–2.6)
MCH RBC QN AUTO: 26.8 PG (ref 27–31)
MCHC RBC AUTO-ENTMCNC: 30.8 G/DL (ref 33–36)
MCV RBC AUTO: 87.1 FL (ref 80–94)
MONOCYTES # BLD AUTO: 0.45 X10(3)/MCL (ref 0.1–1.3)
MONOCYTES NFR BLD AUTO: 8.7 %
NEUTROPHILS # BLD AUTO: 2.96 X10(3)/MCL (ref 2.1–9.2)
NEUTROPHILS NFR BLD AUTO: 57.2 %
PHOSPHATE SERPL-MCNC: 3.9 MG/DL (ref 2.3–4.7)
PLATELET # BLD AUTO: 191 X10(3)/MCL (ref 130–400)
PMV BLD AUTO: 10.4 FL (ref 7.4–10.4)
POTASSIUM SERPL-SCNC: 4.5 MMOL/L (ref 3.5–5.1)
PROT SERPL-MCNC: 5.5 GM/DL (ref 6.4–8.3)
RBC # BLD AUTO: 3.02 X10(6)/MCL (ref 4.7–6.1)
SODIUM SERPL-SCNC: 139 MMOL/L (ref 136–145)
WBC # SPEC AUTO: 5.17 X10(3)/MCL (ref 4.5–11.5)

## 2024-04-08 PROCEDURE — 63600175 PHARM REV CODE 636 W HCPCS: Performed by: STUDENT IN AN ORGANIZED HEALTH CARE EDUCATION/TRAINING PROGRAM

## 2024-04-08 PROCEDURE — 97530 THERAPEUTIC ACTIVITIES: CPT

## 2024-04-08 PROCEDURE — 83735 ASSAY OF MAGNESIUM: CPT | Performed by: INTERNAL MEDICINE

## 2024-04-08 PROCEDURE — 84100 ASSAY OF PHOSPHORUS: CPT | Performed by: INTERNAL MEDICINE

## 2024-04-08 PROCEDURE — 11000004 HC SNF PRIVATE

## 2024-04-08 PROCEDURE — 99900035 HC TECH TIME PER 15 MIN (STAT)

## 2024-04-08 PROCEDURE — 25000003 PHARM REV CODE 250: Performed by: INTERNAL MEDICINE

## 2024-04-08 PROCEDURE — 63600175 PHARM REV CODE 636 W HCPCS: Performed by: INTERNAL MEDICINE

## 2024-04-08 PROCEDURE — 85025 COMPLETE CBC W/AUTO DIFF WBC: CPT | Performed by: INTERNAL MEDICINE

## 2024-04-08 PROCEDURE — 97606 NEG PRS WND THER DME>50 SQCM: CPT

## 2024-04-08 PROCEDURE — 80053 COMPREHEN METABOLIC PANEL: CPT | Performed by: INTERNAL MEDICINE

## 2024-04-08 PROCEDURE — 63600175 PHARM REV CODE 636 W HCPCS: Performed by: PHYSICIAN ASSISTANT

## 2024-04-08 PROCEDURE — 94760 N-INVAS EAR/PLS OXIMETRY 1: CPT

## 2024-04-08 PROCEDURE — 25000003 PHARM REV CODE 250: Performed by: STUDENT IN AN ORGANIZED HEALTH CARE EDUCATION/TRAINING PROGRAM

## 2024-04-08 PROCEDURE — A4216 STERILE WATER/SALINE, 10 ML: HCPCS | Performed by: INTERNAL MEDICINE

## 2024-04-08 PROCEDURE — 25000003 PHARM REV CODE 250: Performed by: PHYSICIAN ASSISTANT

## 2024-04-08 PROCEDURE — 27000207 HC ISOLATION

## 2024-04-08 RX ADMIN — ACETAMINOPHEN 650 MG: 325 TABLET ORAL at 09:04

## 2024-04-08 RX ADMIN — CETIRIZINE HYDROCHLORIDE 10 MG: 10 TABLET, FILM COATED ORAL at 09:04

## 2024-04-08 RX ADMIN — SODIUM CHLORIDE, PRESERVATIVE FREE 10 ML: 5 INJECTION INTRAVENOUS at 05:04

## 2024-04-08 RX ADMIN — COLLAGENASE SANTYL: 250 OINTMENT TOPICAL at 09:04

## 2024-04-08 RX ADMIN — ONDANSETRON 4 MG: 2 INJECTION INTRAMUSCULAR; INTRAVENOUS at 05:04

## 2024-04-08 RX ADMIN — LOPERAMIDE HYDROCHLORIDE 4 MG: 2 CAPSULE ORAL at 09:04

## 2024-04-08 RX ADMIN — MICONAZOLE NITRATE: 20 CREAM TOPICAL at 09:04

## 2024-04-08 RX ADMIN — CLINDAMYCIN HYDROCHLORIDE 150 MG: 150 CAPSULE ORAL at 05:04

## 2024-04-08 RX ADMIN — INSULIN ASPART 4 UNITS: 100 INJECTION, SOLUTION INTRAVENOUS; SUBCUTANEOUS at 05:04

## 2024-04-08 RX ADMIN — PANTOPRAZOLE SODIUM 40 MG: 40 TABLET, DELAYED RELEASE ORAL at 09:04

## 2024-04-08 RX ADMIN — CLINDAMYCIN HYDROCHLORIDE 150 MG: 150 CAPSULE ORAL at 11:04

## 2024-04-08 RX ADMIN — INSULIN ASPART 2 UNITS: 100 INJECTION, SOLUTION INTRAVENOUS; SUBCUTANEOUS at 09:04

## 2024-04-08 RX ADMIN — INSULIN DETEMIR 15 UNITS: 100 INJECTION, SOLUTION SUBCUTANEOUS at 09:04

## 2024-04-08 RX ADMIN — FERROUS SULFATE TAB 325 MG (65 MG ELEMENTAL FE) 1 EACH: 325 (65 FE) TAB at 09:04

## 2024-04-08 RX ADMIN — SITAGLIPTIN 100 MG: 50 TABLET, FILM COATED ORAL at 09:04

## 2024-04-08 RX ADMIN — INSULIN ASPART 6 UNITS: 100 INJECTION, SOLUTION INTRAVENOUS; SUBCUTANEOUS at 05:04

## 2024-04-08 RX ADMIN — DILTIAZEM HYDROCHLORIDE 120 MG: 120 CAPSULE, COATED, EXTENDED RELEASE ORAL at 09:04

## 2024-04-08 RX ADMIN — OXYCODONE HYDROCHLORIDE AND ACETAMINOPHEN 500 MG: 500 TABLET ORAL at 09:04

## 2024-04-08 NOTE — PROGRESS NOTES
[facility]  [facility] MEDICINE ~ PROGRESS NOTE    CHIEF COMPLAINT     Hospital follow up    HOSPITAL COURSE     58-year-old man with quadriplegic spinal paralysis and numerous decubitus ulcers who is followed by wound care is brought in today due to fever. He had wound cultures done 3 days ago that have grown Proteus mirabilis and Klebsiella pneumoniae. Sensitivities are not complete. The wounds were noted to have significant odor and heavy green drainage. In addition the patient had some nausea and vomiting and home health noted that his blood pressure was dipping. He was advised to come to the emergency room. Wound cultures were drawn from the right hip wound. His significant other reports that when the wound is pressed above pus comes out. Both Klebsiella and Proteus maybe treated with fluoroquinolones which are both IV and oral. The patient's significant other preferred if the patient came home so that she could attend to his wounds however the patient expressed a desire to be admitted. Patient did undergo a debridement with Dr. Ruvalcaba [id number]. He required 2u pRBC 02/28/2024 and 1u pRBC on 02/29/2024 with improvement of hemoglobin. Wound culture revealed VRE, Proteus, Pseudomonas, Klebsiella. H&H and platelets continue to decrease, suspect Zyvox induced myelosuppression. Appropriate antibiotics (Tobramycin and Zyvox) were initiated on 03/02/2024 therefore he has received 26 days of antibiotic therapy. Patient is noted to have chronic osteomyelitis therefore he would only require 2 weeks of antibiotic therapy for skin and soft tissue infection. Antibiotics discontinued on 03/28/2024 due to risk vs benefits of continuing antibiotic therapy in the setting of myelosuppression and continue with aggressive wound care. Patient did require the addition of Diltiazem for atrial flutter rate control which was subsequently changed to Coreg due to hypotension. Patient did not tolerate  correct well due to continued increased heart rate therefore he was transitioned back to diltiazem. Patient did have to receive another unit of PRBCs on 04/01/2024 due to decreasing hemoglobin and continued tachycardia which resolved after transfusion.     Today:  No more elevated temperature since yesterday.  Urinalysis was abnormal resulting in reflex to urine culture.  CXR revealed increased attenuation in the right lower lung zone concerning for infiltrate, but patient denies any coughing or shortness of breath.  Labs without leukocytosis.  Patient currently receiving empiric clindamycin p.o.    OBJECTIVE/PHYSICAL EXAM     VITAL SIGNS (MOST RECENT):  Temp: 98.2 °F (36.8 °C) (04/07/24 1929)  Pulse: 88 (04/07/24 1929)  Resp: 18 (04/07/24 1929)  BP: (!) 145/87 (04/07/24 1929)  SpO2: 99 % (04/07/24 1935) VITAL SIGNS (24 HOUR RANGE):  Temp:  [98.2 °F (36.8 °C)] 98.2 °F (36.8 °C)  Pulse:  [88] 88  Resp:  [18] 18  SpO2:  [96 %-99 %] 99 %  BP: (145)/(87) 145/87     GENERAL: In no acute distress, afebrile  HEENT:  Atraumatic, normocephalic, moist mucous membranes  CHEST: Clear to auscultation bilaterally  HEART: S1, S2, no appreciable murmur  ABDOMEN: Soft, nontender, BS +  MSK: Warm, no lower extremity edema, no clubbing or cyanosis  NEUROLOGIC: Alert and oriented x4, hemiplegia  INTEGUMENTARY:  See media for wounds  PSYCHIATRY:  Appropriate mood and affect    ASSESSMENT/PLAN     Nonhealing chronic wound   VRE, Proteus, Pseudomonas, Klebsiella   Chronic osteomyelitis   S/p debridement with Dr. Jean on 02/27/2024  Zyvox and Tobramycin 03/02/2024-03/28/2024  History of chronic osteomyelitis - only needs 2 weeks of antibiotic therapy for acute skin and soft tissue infection  Discontinue antibiotics 03/28/2024 due to risk vs benefits of continuing antibiotic therapy in the setting of myelosuppression and continue with aggressive wound care  Wound VAC and wound care - ordering home wound vac   Appreciate dietary  recommendations for supplements     Atrial flutter  Hypertension/Hypotension   Diltiazem started 3/16/2024-03/28/2024 and required multiple adjustments due to hypotension and tachycardia changed to Coreg  No improvement with Coreg, diltiazem resumed once again 3/31 and transitioned to long acting on 04/02/2024  No AC 2/2 patient request as he has has prior episodes of acute blood loss from wounds - aspirin d/c 02/28/2024  HR improved after blood transfusion 04/01/2024  Hypertensive episodes are associated with pain     Post op anemia   Thrombocytopenia   Myelosuppression 2/2 Zyvox   H&H improved s/p 2u pRBC 02/28/2024, 1u pRBC on 02/29/2024, 1u pRBC on 04/01/2024  No anticoagulation at this time - aspirin d/c 02/28/2024  Peripheral smear without significant findings     Elevated temperature   UTI - possible colonization from patient report   Urine culture pending  CXR revealed increased attenuation in the RLL zone concerning for infiltrate, but patient denies any coughing or shortness of breath  Labs without leukocytosis, afebrile   Currently receiving empiric clindamycin p.o.     Hyperglycemia/hypoglycemia in setting of DM  A1c 02/23/2024 8.5%  Reports taking 80u Levemir BID  Continue home Sitagliptin  Continue titrating insulin regimen as needed     GISSEL - improving   Hyperkalemia - resolved   24 hour urine potassium shows adequate excretion     Left upper extremity swelling - resolved   Ultrasound without DVT     Hx of: Neurogenic bladder, Quadriplegia  Changed suprapubic catheter 03/31/2024     DVT prophylaxis:  SCDs, history of bleeding in the recent past     Anticipated discharge and disposition: home with [facility], wound VAC delivered, training with slide board today, plan for discharge home on 04/09/2024   __________________________________________________________________________    LABS/MICRO/MEDS/DIAGNOSTICS     LABS  Recent Labs     04/08/24  0525      K 4.5   CHLORIDE 109*   CO2 21*   BUN 23.7    CREATININE 1.57*   GLUCOSE 206*   CALCIUM 8.3*   ALKPHOS 78   AST 6   ALT 11   ALBUMIN 2.4*     Recent Labs     04/08/24  0525   WBC 5.17   RBC 3.02*   HCT 26.3*   MCV 87.1        MICROBIOLOGY  Microbiology Results (last 7 days)       Procedure Component Value Units Date/Time    Urine culture [[id number]] Collected: 04/07/24 0909    Order Status: Sent Specimen: Urine Updated: 04/07/24 1005             MEDICATIONS   acetaminophen  650 mg Oral QHS    ascorbic acid (vitamin C)  500 mg Oral Daily    cetirizine  10 mg Oral Daily    clindamycin  150 mg Oral Q6H    collagenase   Topical (Top) Daily    diltiaZEM  120 mg Oral Daily    ferrous sulfate  1 tablet Oral Daily    fluconazole  200 mg Oral Weekly    insulin detemir U-100  15 Units Subcutaneous QHS    loperamide  4 mg Oral Daily    miconazole   Topical (Top) BID    pantoprazole  40 mg Oral Daily    SITagliptin phosphate  100 mg Oral Daily    sodium chloride 0.9%  10 mL Intravenous Q6H         INFUSIONS       DIAGNOSTIC TESTS  X-Ray Chest 1 View   Final Result      1. Stable cardiomegaly.   2. Increased attenuation in the right lower lung zone concerning for infiltrate.         Electronically signed by: Cuco Soriano   Date:    04/07/2024   Time:    09:11      US Upper Extremity Veins Left   Final Result      No venous thrombosis identified.         Electronically signed by: Andrade Perez   Date:    03/18/2024   Time:    21:08      X-Ray Chest 1 View   Final Result      Left PICC tip overlies the mid SVC.         Electronically signed by: Oj Solomon   Date:    02/28/2024   Time:    14:29           EF   Date Value Ref Range Status   05/09/2023 60 % Final   07/18/2022 40 % Final        NUTRITION STATUS  Patient meets ASPEN criteria for   malnutrition of   per RD assessment as evidenced by:                       A minimum of two characteristics is recommended for diagnosis of either severe or non-severe malnutrition.     Case related differential diagnoses have  been reviewed; assessment and plan has been documented. I have personally reviewed the labs and test results that are currently available; I have reviewed the patients medication list. I have reviewed the consulting providers recommendations. I have reviewed or attempted to review medical records based upon their availability.  All of the patient's and/or family's questions have been addressed and answered to the best of my ability.  I will continue to monitor closely and make adjustments to medical management as needed.  This document was created using M*Modal Fluency Direct.  Transcription errors may have been made.  Please contact me if any questions may rise regarding documentation to clarify transcription.      ANTHONY Vazquez   Internal Medicine  Department of Hospital Medicine [facility]

## 2024-04-08 NOTE — PROGRESS NOTES
Follow up assessment performed of feet / left hip ulceration.    Ulcerations to feet / heel remain closed/resurfaced.  L hip ulceration remains stable.  NPWT dressings in place to sacrum / R lateral hip.  Seal intact.   Continuous therapy @ -125mmHg with twice weekly dressing change.  Dressing change to Sacrum / R lateral buttock will be deferred until Tuesday 4/9/24 for Dr. Jean to assess sites prior to discharge home.  All pressure prevention measures remain in use.     04/08/24 0950   WOCN Assessment   WOCN Total Time (mins) 60   Visit Date 04/08/24   Consult Type Follow Up   WOCN Speciality Wound   Continence Type Fecal   Intervention assessed;changed   Teaching on-going   Skin Interventions   Device Skin Pressure Protection adhesive use limited;absorbent pad utilized/changed;positioning supports utilized;pressure points protected   Pressure Reduction Devices alternating pressure pump (ADD);chair cushion utilized;heel offloading device utilized;positioning supports utilized;specialty bed utilized   Pressure Reduction Techniques heels elevated off bed;positioned off wounds;pressure points protected;weight shift assistance provided;sit time limited to 2 hours   Skin Protection incontinence pads utilized;tubing/devices free from skin contact   Pressure Injury Prevention    Check Moisture Management Pad Done   Sacral Foam Dressing Patient incontinent, barrier cream applied   Heel protection technique Pillow   Check Medical Devices Done        Altered Skin Integrity 05/06/22 1140 Right Heel  Full thickness tissue loss. Subcutaneous fat may be visible but bone, tendon or muscle are not exposed   Date First Assessed/Time First Assessed: 05/06/22 1140   Altered Skin Integrity Present on Admission: yes  Side: Right  Location: Heel  Is this injury device related?: No  Primary Wound Type: (c)   Description of Altered Skin Integrity: Full thickness...   Wound Image    Dressing Appearance Clean;Intact;Dry   Drainage  Amount None   Appearance Pink;Dry;Closed/resurfaced;Fibrin  (scab)   Periwound Area Dry;Intact;Scar tissue   Wound Length (cm) 0 cm   Wound Width (cm) 0 cm   Wound Depth (cm) 0 cm   Wound Volume (cm^3) 0 cm^3   Wound Surface Area (cm^2) 0 cm^2   Care Cleansed with:;Sterile normal saline   Dressing Changed;Non-adherent;Rolled gauze   Dressing Change Due 04/10/24        Altered Skin Integrity 08/01/23 1534 Left posterior Ankle Full thickness tissue loss. Base is covered by slough and/or eschar in the wound bed   Date First Assessed/Time First Assessed: 08/01/23 1534   Altered Skin Integrity Present on Admission - Did Patient arrive to the hospital with altered skin?: yes  Side: Left  Orientation: posterior  Location: Ankle  Description of Altered Skin Integri...   Wound Image    Dressing Appearance Intact;Dry;Clean   Appearance Purple;Closed/resurfaced   Wound Length (cm) 0 cm   Wound Width (cm) 0 cm   Wound Depth (cm) 0 cm   Wound Volume (cm^3) 0 cm^3   Wound Surface Area (cm^2) 0 cm^2   Dressing Changed;Rolled gauze   Dressing Change Due 04/10/24        Altered Skin Integrity 08/01/23 1535 Left anterior Ankle Other (comment) Full thickness tissue loss. Base is covered by slough and/or eschar in the wound bed   Date First Assessed/Time First Assessed: 08/01/23 1535   Altered Skin Integrity Present on Admission - Did Patient arrive to the hospital with altered skin?: yes  Side: Left  Orientation: anterior  Location: Ankle  Is this injury device related?: No  ...   Wound Image    Dressing Appearance Intact;Dry;Clean   Drainage Amount None   Appearance Maroon;Closed/resurfaced   Wound Length (cm) 0 cm   Wound Width (cm) 0 cm   Wound Depth (cm) 0 cm   Wound Volume (cm^3) 0 cm^3   Wound Surface Area (cm^2) 0 cm^2   Dressing Changed;Rolled gauze   Dressing Change Due 04/10/24        Incision/Site 08/16/23 2011 Left Hip lateral   Date First Assessed/Time First Assessed: 08/16/23 2011   Side: Left  Location: Hip   Orientation: lateral   Wound Image    Dressing Appearance Intact;Moist drainage   Drainage Amount Small   Drainage Characteristics/Odor Serous;No odor   Appearance Pink;Red;Tan;Granulating;Fibrin   Red (%), Wound Tissue Color 95 %   Yellow (%), Wound Tissue Color 5 %   Periwound Area Intact;Dry;Scar tissue   Wound Edges Defined   Wound Length (cm) 2.7 cm   Wound Width (cm) 2.5 cm   Wound Depth (cm) 1 cm   Wound Volume (cm^3) 6.75 cm^3   Wound Surface Area (cm^2) 6.75 cm^2   Undermining (depth (cm)/location) 1 to 11 o'clock / 2cm at 3 o'clock   Care Cleansed with:;Antimicrobial agent;Wound cleanser   Dressing Changed;Sodium chloride impregnated;Gauze;Absorptive Pad;Other (comment)  (medfix)   Packing packed with;other (see comment)  (vashe moistened mesalt)   Dressing Change Due 04/09/24

## 2024-04-08 NOTE — PT/OT/SLP PROGRESS
Physical Therapy Treatment Note           Name: Antonio Galvan II    : 1965 (58 y.o.)  MRN: [id number]           TREATMENT SUMMARY AND RECOMMENDATIONS:    PT Received On: 24  PT Start Time: 1100     PT Stop Time: 1122  PT Total Time (min): 22 min     Subjective Assessment:   No complaints  Lethargic   x Awake, alert, cooperative  Uncooperative    Agitated  c/o pain    Appropriate  c/o fatigue    Confused x Treated at bedside     Emotionally labile  Treated in gym/dept.    Impulsive  Other:    Flat affect       Therapy Precautions:    Cognitive deficits  Spinal precautions    Collar - hard  Sternal precautions    Collar - soft   TLSO    Fall risk  LSO    Hip precautions - posterior  Knee immobilizer    Hip precautions - anterior  WBAT    Impaired communication  Partial weightbearing    Oxygen  TTWB    PEG tube  NWB    Visual deficits x Other: Wound Vac and Super pubic cath    Hearing deficits  x OK 'd to get in chair 2-3 hours per day       Treatment Objectives:     Mobility Training:   Assist level Comments    Bed mobility Total A Rolling side to side for tess pad placement and donning pants. Proper WC positioning completed with to reduce skin break down.    Transfer Total A Tess from bed>chair; x 2 person assistance for maintenance of cath, wound vac and B LE   Gait     Sit to stand transitions     Sitting balance     Standing balance      Wheelchair mobility     Car transfer              Therapeutic Exercise:   Exercise Sets Reps Comments                               Additional Comments:  Pt reported he would not practice with the new slide board here, that he has been doing it for 30 years, that I could practice with it. PT explained we needed him to practice as well, but pt declined. Sitter showed concern since this will be new for her. PT to train with sitter as able to ensure comfort with device. Pt request tess t/f, plans to DC tomorrow.     Assessment: Patient tolerated session  well, though PT is discouraged that pt does not want to practice with new equipment.     PT Plan: Continue per POC  Revisions made to plan of care: No    GOALS:   Multidisciplinary Problems       Physical Therapy Goals          Problem: Physical Therapy    Goal Priority Disciplines Outcome Goal Variances Interventions   Physical Therapy Goal     PT, PT/OT Ongoing, Progressing     Description: Goals to be met by: Discharge     Pt to be seen for ROM tasks 1-2 x/day to ensure proper positioning can be obtained to overall reduce impacts of prolonged bed rest and skin break down    Progress pending healing stages of current wounds    Patient will increase functional independence with mobility by performin. Pt to tolerate PROM tasks to B UE and LE, while obtaining 25% improvement in range.   2. Sitting EOB to tolerance pending wound healing - with pt demonstrating normal BP- Goal Met  3. Progress to OOB into chair once able. - Met  4. Pt to tolerate up to 3 hours in power chair - Met  5. Caregivers to be able to use tess with 1-2 people.                          Skilled PT Minutes Provided: 20   Communication with Treatment Team:     Equipment recommendations:       At end of treatment, patient remained:   Up in chair    x Up in wheelchair in room    In bed    With alarm activated    Bed rails up   x Call bell in reach    x Family/friends present    Restraints secured properly    In bathroom with CNA/RN notified   x Nurse aware    In gym with therapist/tech    Other:

## 2024-04-08 NOTE — PLAN OF CARE
Problem: Adult Inpatient Plan of Care  Goal: Plan of Care Review  Outcome: Ongoing, Progressing  Goal: Patient-Specific Goal (Individualized)  Outcome: Ongoing, Progressing  Flowsheets (Taken 4/8/2024 1236)  Anxieties, Fears or Concerns: none expressed  Individualized Care Needs: monitor wound vac, wound care, turn Q2, monitor blood glucose  Goal: Absence of Hospital-Acquired Illness or Injury  Outcome: Ongoing, Progressing  Intervention: Identify and Manage Fall Risk  Flowsheets (Taken 4/8/2024 1236)  Safety Promotion/Fall Prevention:   assistive device/personal item within reach   bed alarm set   nonskid shoes/socks when out of bed   side rails raised x 2  Goal: Optimal Comfort and Wellbeing  Outcome: Ongoing, Progressing  Goal: Readiness for Transition of Care  Outcome: Ongoing, Progressing     Problem: Diabetes Comorbidity  Goal: Blood Glucose Level Within Targeted Range  Outcome: Ongoing, Progressing  Intervention: Monitor and Manage Glycemia  Flowsheets (Taken 4/8/2024 1236)  Glycemic Management: blood glucose monitored     Problem: Skin Injury Risk Increased  Goal: Skin Health and Integrity  Outcome: Ongoing, Progressing  Intervention: Optimize Skin Protection  Flowsheets (Taken 4/8/2024 1236)  Pressure Reduction Techniques: frequent weight shift encouraged  Skin Protection:   adhesive use limited   incontinence pads utilized   tubing/devices free from skin contact  Head of Bed (HOB) Positioning: HOB at 20-30 degrees     Problem: Impaired Wound Healing  Goal: Optimal Wound Healing  Outcome: Ongoing, Progressing     Problem: Fall Injury Risk  Goal: Absence of Fall and Fall-Related Injury  Outcome: Ongoing, Progressing  Intervention: Identify and Manage Contributors  Flowsheets (Taken 4/8/2024 1236)  Self-Care Promotion:   independence encouraged   safe use of adaptive equipment encouraged   BADL personal objects within reach  Medication Review/Management: medications reviewed  Intervention: Promote  Injury-Free Environment  Flowsheets (Taken 4/8/2024 1236)  Safety Promotion/Fall Prevention:   assistive device/personal item within reach   bed alarm set   nonskid shoes/socks when out of bed   side rails raised x 2     Problem: Infection  Goal: Absence of Infection Signs and Symptoms  Outcome: Ongoing, Progressing  Intervention: Prevent or Manage Infection  Flowsheets (Taken 4/8/2024 1239)  Infection Management: aseptic technique maintained  Isolation Precautions: protective

## 2024-04-08 NOTE — PT/OT/SLP PROGRESS
Physical Therapy Treatment Note           Name: Antonio Galvan II    : 1965 (58 y.o.)  MRN: [id number]           TREATMENT SUMMARY AND RECOMMENDATIONS:    PT Received On: 24  PT Start Time: 1405     PT Stop Time: 1454  PT Total Time (min): 49 min     Subjective Assessment:   No complaints  Lethargic   x Awake, alert, cooperative  Uncooperative    Agitated  c/o pain    Appropriate  c/o fatigue    Confused x Treated at bedside     Emotionally labile  Treated in gym/dept.    Impulsive  Other:    Flat affect       Therapy Precautions:    Cognitive deficits  Spinal precautions    Collar - hard  Sternal precautions    Collar - soft   TLSO    Fall risk  LSO    Hip precautions - posterior  Knee immobilizer    Hip precautions - anterior  WBAT    Impaired communication  Partial weightbearing    Oxygen  TTWB    PEG tube  NWB    Visual deficits x Other: Wound Vac and Super pubic cath    Hearing deficits  x OK 'd to get in chair 2-3 hours per day       Treatment Objectives:     Mobility Training:   Assist level Comments    Bed mobility Total A Rolling side to side for tess pad removal and doffing pants. Proper bed positioning completed with pt in supine using pillow for UE and LE to reduce skin break down.    Transfer Total A chair>bed; 1 person using standard slide board- no wound friction demonstrated.    Gait     Sit to stand transitions     Sitting balance     Standing balance      Wheelchair mobility     Car transfer     Other: Care giver training(30 Min)  Trials with new slide board using PT, pt's mom and caregiver(pt still not wanting to use for him at this time). Focus on reducing shearing to protect wound site       Therapeutic Exercise:   Exercise Sets Reps Comments                               Additional Comments:  Extra time spent on training today due to DC tomorrow and beasy board coming in today.     Assessment: Patient tolerated session well ,    PT Plan: Continue per POC  Revisions  made to plan of care: No    GOALS:   Multidisciplinary Problems       Physical Therapy Goals          Problem: Physical Therapy    Goal Priority Disciplines Outcome Goal Variances Interventions   Physical Therapy Goal     PT, PT/OT Ongoing, Progressing     Description: Goals to be met by: Discharge     Pt to be seen for ROM tasks 1-2 x/day to ensure proper positioning can be obtained to overall reduce impacts of prolonged bed rest and skin break down    Progress pending healing stages of current wounds    Patient will increase functional independence with mobility by performin. Pt to tolerate PROM tasks to B UE and LE, while obtaining 25% improvement in range.   2. Sitting EOB to tolerance pending wound healing - with pt demonstrating normal BP- Goal Met  3. Progress to OOB into chair once able. - Met  4. Pt to tolerate up to 3 hours in power chair - Met  5. Caregivers to be able to use tess with 1-2 people.                          Skilled PT Minutes Provided: 45   Communication with Treatment Team:     Equipment recommendations:       At end of treatment, patient remained:   Up in chair     Up in wheelchair in room   x In bed    With alarm activated    Bed rails up   x Call bell in reach    x Family/friends present    Restraints secured properly    In bathroom with CNA/RN notified   x Nurse aware    In gym with therapist/tech    Other:

## 2024-04-08 NOTE — PLAN OF CARE
Problem: Adult Inpatient Plan of Care  Goal: Plan of Care Review  Outcome: Ongoing, Progressing  Flowsheets (Taken 4/7/2024 2200)  Plan of Care Reviewed With:   patient   caregiver  Goal: Patient-Specific Goal (Individualized)  Outcome: Ongoing, Progressing  Flowsheets (Taken 4/7/2024 2200)  Anxieties, Fears or Concerns: none expressed at this time  Individualized Care Needs: monitor CBGS, monitor vitals, maintain seal on wound vac, daily wound care, turn q2, monitor for s/s of worsening infection  Goal: Absence of Hospital-Acquired Illness or Injury  Outcome: Ongoing, Progressing  Intervention: Identify and Manage Fall Risk  Flowsheets (Taken 4/7/2024 2200)  Safety Promotion/Fall Prevention:   assistive device/personal item within reach   family to remain at bedside   nonskid shoes/socks when out of bed   side rails raised x 2  Intervention: Prevent Skin Injury  Flowsheets (Taken 4/7/2024 2200)  Body Position: sitting up in bed  Skin Protection:   adhesive use limited   incontinence pads utilized   tubing/devices free from skin contact  Intervention: Prevent and Manage VTE (Venous Thromboembolism) Risk  Flowsheets (Taken 4/7/2024 2200)  Activity Management: Patient unable to perform activities  VTE Prevention/Management:   bleeding risk assessed   bleeding precations maintained   fluids promoted  Range of Motion: ROM (range of motion) performed  Intervention: Prevent Infection  Flowsheets (Taken 4/7/2024 2200)  Infection Prevention:   cohorting utilized   environmental surveillance performed   equipment surfaces disinfected   hand hygiene promoted   single patient room provided   rest/sleep promoted   personal protective equipment utilized  Goal: Optimal Comfort and Wellbeing  Outcome: Ongoing, Progressing  Intervention: Monitor Pain and Promote Comfort  Flowsheets (Taken 4/7/2024 2200)  Pain Management Interventions:   care clustered   pillow support provided   position adjusted   relaxation techniques promoted    quiet environment facilitated  Intervention: Provide Person-Centered Care  Flowsheets (Taken 4/7/2024 2200)  Trust Relationship/Rapport:   care explained   questions encouraged   choices provided   reassurance provided   emotional support provided   thoughts/feelings acknowledged   empathic listening provided   questions answered  Goal: Readiness for Transition of Care  Outcome: Ongoing, Progressing  Intervention: Mutually Develop Transition Plan  Flowsheets (Taken 4/7/2024 2200)  Communicated SADE with patient/caregiver: Date not available/Unable to determine     Problem: Diabetes Comorbidity  Goal: Blood Glucose Level Within Targeted Range  Outcome: Ongoing, Progressing  Intervention: Monitor and Manage Glycemia  Flowsheets (Taken 4/7/2024 2200)  Glycemic Management:   blood glucose monitored   supplemental insulin given     Problem: Skin Injury Risk Increased  Goal: Skin Health and Integrity  Outcome: Ongoing, Progressing  Intervention: Optimize Skin Protection  Flowsheets (Taken 4/7/2024 2200)  Pressure Reduction Techniques:   positioned off wounds   heels elevated off bed   pressure points protected   weight shift assistance provided  Pressure Reduction Devices: positioning supports utilized  Skin Protection:   adhesive use limited   incontinence pads utilized   tubing/devices free from skin contact  Head of Bed (HOB) Positioning: HOB at 30-45 degrees  Intervention: Promote and Optimize Oral Intake  Flowsheets (Taken 4/7/2024 2200)  Oral Nutrition Promotion:   calorie-dense liquids provided   calorie-dense foods provided   safe use of adaptive equipment encouraged     Problem: Impaired Wound Healing  Goal: Optimal Wound Healing  Outcome: Ongoing, Progressing  Intervention: Promote Wound Healing  Flowsheets (Taken 4/7/2024 2200)  Oral Nutrition Promotion:   calorie-dense liquids provided   calorie-dense foods provided   safe use of adaptive equipment encouraged  Sleep/Rest Enhancement:   awakenings minimized    consistent schedule promoted   regular sleep/rest pattern promoted  Activity Management: Patient unable to perform activities  Pain Management Interventions:   care clustered   pillow support provided   position adjusted   relaxation techniques promoted   quiet environment facilitated     Problem: Fall Injury Risk  Goal: Absence of Fall and Fall-Related Injury  Outcome: Ongoing, Progressing  Intervention: Identify and Manage Contributors  Flowsheets (Taken 4/7/2024 2200)  Self-Care Promotion:   independence encouraged   BADL personal objects within reach   BADL personal routines maintained   meal set-up provided   safe use of adaptive equipment encouraged  Medication Review/Management:   medications reviewed   high-risk medications identified  Intervention: Promote Injury-Free Environment  Flowsheets (Taken 4/7/2024 2200)  Safety Promotion/Fall Prevention:   assistive device/personal item within reach   family to remain at bedside   nonskid shoes/socks when out of bed   side rails raised x 2     Problem: Infection  Goal: Absence of Infection Signs and Symptoms  Outcome: Ongoing, Progressing  Intervention: Prevent or Manage Infection  Flowsheets (Taken 4/7/2024 2200)  Fever Reduction/Comfort Measures:   lightweight bedding   lightweight clothing  Infection Management: aseptic technique maintained  Isolation Precautions:   precautions maintained   contact

## 2024-04-09 VITALS
HEART RATE: 93 BPM | RESPIRATION RATE: 18 BRPM | WEIGHT: 202.81 LBS | DIASTOLIC BLOOD PRESSURE: 85 MMHG | TEMPERATURE: 98 F | SYSTOLIC BLOOD PRESSURE: 126 MMHG | OXYGEN SATURATION: 99 % | BODY MASS INDEX: 31.83 KG/M2 | HEIGHT: 67 IN

## 2024-04-09 LAB — GLUCOSE SERPL-MCNC: 179 MG/DL (ref 70–110)

## 2024-04-09 PROCEDURE — 63600175 PHARM REV CODE 636 W HCPCS: Performed by: PHYSICIAN ASSISTANT

## 2024-04-09 PROCEDURE — 97530 THERAPEUTIC ACTIVITIES: CPT

## 2024-04-09 PROCEDURE — 97606 NEG PRS WND THER DME>50 SQCM: CPT

## 2024-04-09 PROCEDURE — 63600175 PHARM REV CODE 636 W HCPCS: Performed by: INTERNAL MEDICINE

## 2024-04-09 PROCEDURE — A4216 STERILE WATER/SALINE, 10 ML: HCPCS | Performed by: INTERNAL MEDICINE

## 2024-04-09 PROCEDURE — 25000003 PHARM REV CODE 250: Performed by: INTERNAL MEDICINE

## 2024-04-09 PROCEDURE — 25000003 PHARM REV CODE 250: Performed by: STUDENT IN AN ORGANIZED HEALTH CARE EDUCATION/TRAINING PROGRAM

## 2024-04-09 PROCEDURE — 25000003 PHARM REV CODE 250: Performed by: PHYSICIAN ASSISTANT

## 2024-04-09 RX ORDER — CLINDAMYCIN HYDROCHLORIDE 150 MG/1
150 CAPSULE ORAL EVERY 6 HOURS
Qty: 20 CAPSULE | Refills: 0 | Status: SHIPPED | OUTPATIENT
Start: 2024-04-09 | End: 2024-04-14

## 2024-04-09 RX ORDER — DILTIAZEM HYDROCHLORIDE 120 MG/1
120 CAPSULE, COATED, EXTENDED RELEASE ORAL DAILY
Qty: 30 CAPSULE | Refills: 0 | Status: SHIPPED | OUTPATIENT
Start: 2024-04-10 | End: 2024-05-10

## 2024-04-09 RX ORDER — DOXYLAMINE SUCCINATE 25 MG
TABLET ORAL 2 TIMES DAILY
Qty: 57 G | Refills: 0 | Status: SHIPPED | OUTPATIENT
Start: 2024-04-09

## 2024-04-09 RX ADMIN — CLINDAMYCIN HYDROCHLORIDE 150 MG: 150 CAPSULE ORAL at 06:04

## 2024-04-09 RX ADMIN — FERROUS SULFATE TAB 325 MG (65 MG ELEMENTAL FE) 1 EACH: 325 (65 FE) TAB at 09:04

## 2024-04-09 RX ADMIN — COLLAGENASE SANTYL: 250 OINTMENT TOPICAL at 09:04

## 2024-04-09 RX ADMIN — CETIRIZINE HYDROCHLORIDE 10 MG: 10 TABLET, FILM COATED ORAL at 09:04

## 2024-04-09 RX ADMIN — INSULIN ASPART 2 UNITS: 100 INJECTION, SOLUTION INTRAVENOUS; SUBCUTANEOUS at 06:04

## 2024-04-09 RX ADMIN — SITAGLIPTIN 100 MG: 50 TABLET, FILM COATED ORAL at 09:04

## 2024-04-09 RX ADMIN — ONDANSETRON 4 MG: 2 INJECTION INTRAMUSCULAR; INTRAVENOUS at 12:04

## 2024-04-09 RX ADMIN — LOPERAMIDE HYDROCHLORIDE 4 MG: 2 CAPSULE ORAL at 09:04

## 2024-04-09 RX ADMIN — SODIUM CHLORIDE, PRESERVATIVE FREE 10 ML: 5 INJECTION INTRAVENOUS at 12:04

## 2024-04-09 RX ADMIN — OXYCODONE HYDROCHLORIDE AND ACETAMINOPHEN 500 MG: 500 TABLET ORAL at 09:04

## 2024-04-09 RX ADMIN — PANTOPRAZOLE SODIUM 40 MG: 40 TABLET, DELAYED RELEASE ORAL at 09:04

## 2024-04-09 RX ADMIN — DILTIAZEM HYDROCHLORIDE 120 MG: 120 CAPSULE, COATED, EXTENDED RELEASE ORAL at 09:04

## 2024-04-09 RX ADMIN — CLINDAMYCIN HYDROCHLORIDE 150 MG: 150 CAPSULE ORAL at 12:04

## 2024-04-09 RX ADMIN — SODIUM CHLORIDE, PRESERVATIVE FREE 10 ML: 5 INJECTION INTRAVENOUS at 06:04

## 2024-04-09 NOTE — PLAN OF CARE
Problem: Physical Therapy  Goal: Physical Therapy Goal  Description: Goals to be met by: Discharge     Pt to be seen for ROM tasks 1-2 x/day to ensure proper positioning can be obtained to overall reduce impacts of prolonged bed rest and skin break down    Progress pending healing stages of current wounds    Patient will increase functional independence with mobility by performin. Pt to tolerate PROM tasks to B UE and LE, while obtaining 25% improvement in range.   2. Sitting EOB to tolerance pending wound healing - with pt demonstrating normal BP- Goal Met  3. Progress to OOB into chair once able. - Met  4. Pt to tolerate up to 3 hours in power chair - Met  5. Caregivers to be able to use tess with 1-2 people.     Outcome: Adequate for Care Transition

## 2024-04-09 NOTE — PT/OT/SLP PROGRESS
Physical Therapy Treatment Note           Name: Antonio Galvan II    : 1965 (58 y.o.)  MRN: [id number]           TREATMENT SUMMARY AND RECOMMENDATIONS:    PT Received On: 24  PT Start Time: 1245     PT Stop Time: 1300  PT Total Time (min): 15 min     Subjective Assessment:   No complaints  Lethargic   x Awake, alert, cooperative  Uncooperative    Agitated  c/o pain    Appropriate  c/o fatigue    Confused x Treated at bedside     Emotionally labile  Treated in gym/dept.    Impulsive  Other:    Flat affect       Therapy Precautions:    Cognitive deficits  Spinal precautions    Collar - hard  Sternal precautions    Collar - soft   TLSO    Fall risk  LSO    Hip precautions - posterior  Knee immobilizer    Hip precautions - anterior  WBAT    Impaired communication  Partial weightbearing    Oxygen  TTWB    PEG tube  NWB    Visual deficits x Other: Wound Vac and Super pubic cath    Hearing deficits  x OK 'd to get in chair 2-3 hours per day       Treatment Objectives:     Mobility Training:   Assist level Comments    Bed mobility Total A Supine>sit  on R side of bed onto beasy board   Transfer Total A Bed>chair; 1 person using beasy slide board- excellent tolerance demonstrated. Less caregiver burden demonstrated and no wound friction demonstrated.    Gait     Sit to stand transitions     Sitting balance     Standing balance      Wheelchair mobility     Car transfer     Other:          Therapeutic Exercise:   Exercise Sets Reps Comments                               Additional Comments:  PT assisted with transfer at DC per pt request, now wanting to try the beasy board.     Assessment: Patient tolerated session well , care giver loved the board and said it helped with transfers, pt still hesitant due to fear of pinching.     PT Plan: DC  Revisions made to plan of care: DC    GOALS:   Multidisciplinary Problems       Physical Therapy Goals          Problem: Physical Therapy    Goal Priority  Disciplines Outcome Goal Variances Interventions   Physical Therapy Goal     PT, PT/OT Ongoing, Progressing     Description: Goals to be met by: Discharge     Pt to be seen for ROM tasks 1-2 x/day to ensure proper positioning can be obtained to overall reduce impacts of prolonged bed rest and skin break down    Progress pending healing stages of current wounds    Patient will increase functional independence with mobility by performin. Pt to tolerate PROM tasks to B UE and LE, while obtaining 25% improvement in range.   2. Sitting EOB to tolerance pending wound healing - with pt demonstrating normal BP- Goal Met  3. Progress to OOB into chair once able. - Met  4. Pt to tolerate up to 3 hours in power chair - Met  5. Caregivers to be able to use tess with 1-2 people.                          Skilled PT Minutes Provided: 15   Communication with Treatment Team:     Equipment recommendations:       At end of treatment, patient remained:  x Up in chair     Up in wheelchair in room    In bed    With alarm activated    Bed rails up    Call bell in reach    x Family/friends present    Restraints secured properly    In bathroom with CNA/RN notified   x Nurse aware    In gym with therapist/tech    Other:

## 2024-04-09 NOTE — PROGRESS NOTES
Dr. Jean rounded, examined sacral and R buttock ulcerations.   New orders noted for pt to be discharged with every Tuesday / Thursday / Saturday wound vac dressing changes.   Recommend continuing to utilize hydrocolloid to frame periwound, hydrocolloid ring in creases and mastisol to obtain and maintain seal.  Sites are being bridged with trac pad to the hip to prevent pressure from tubing.  Pt will follow up in wound care center in 2 weeks and have dressing changes with [facility] until that time.  Discussed with Pt and caregiver at bedside.  Verbalized understanding.     04/09/24 1215   WOCN Assessment   WOCN Total Time (mins) 60   Visit Date 04/09/24   WOCN Speciality Wound   WOCN List wound vac   Procedure wound vac   Intervention assessed;changed;orders   Teaching discharge        Altered Skin Integrity 01/12/23 1530 Sacral spine Full thickness tissue loss with exposed bone, tendon, or muscle. Often includes undermining and tunneling. May extend into muscle and/or supporting structures.   Date First Assessed/Time First Assessed: 01/12/23 1530   Altered Skin Integrity Present on Admission - Did Patient arrive to the hospital with altered skin?: yes  Location: Sacral spine  Description of Altered Skin Integrity: Full thickness tissue los...   Wound Image    Description of Altered Skin Integrity Full thickness tissue loss with exposed bone, tendon, or muscle. Often includes undermining and tunneling. May extend into muscle and/or supporting structures.   Dressing Appearance Dry;Intact   Drainage Amount Moderate   Drainage Characteristics/Odor Serosanguineous   Appearance Red;Granulating;Tan;Yellow;White   Tissue loss description Full thickness   Red (%), Wound Tissue Color 65 %   Yellow (%), Wound Tissue Color 5 %   Periwound Area Scar tissue   Wound Edges Defined   Wound Length (cm) 8.5 cm   Wound Width (cm) 7.5 cm   Wound Depth (cm) 2.8 cm   Wound Volume (cm^3) 178.5 cm^3   Wound Surface Area  (cm^2) 63.75 cm^2   Care Cleansed with:;Antimicrobial agent   Dressing Changed;Foam;Transparent film  (black granufoam/ vac drape)   Periwound Care Dry periwound area maintained;Hydrocolloid barrier applied;Skin barrier film applied;Topical treatment applied  (mastisol for additional adhesive / hydrocolloid paste)   Dressing Change Due 04/11/24        Incision/Site 02/27/24 1450 Right Buttocks lateral   Date First Assessed/Time First Assessed: 02/27/24 1450   Side: Right  Location: Buttocks  Orientation: lateral  Additional Comments: mesalt, quick clot, gauze, abdomen pad, and tape   Wound Image    Dressing Appearance Dry;Intact   Drainage Amount Moderate   Drainage Characteristics/Odor Serosanguineous   Appearance Red;Granulating   Periwound Area Scar tissue;Moist   Wound Edges Defined   Wound Length (cm) 7 cm   Wound Width (cm) 12.5 cm   Wound Depth (cm) 1.3 cm   Wound Volume (cm^3) 113.75 cm^3   Wound Surface Area (cm^2) 87.5 cm^2   Undermining (depth (cm)/location) 12 o'clock to 2 o'clock / 4.3cm at 1 o'clock k   Care Cleansed with:;Antimicrobial agent   Dressing Changed;Non-adherent;Foam;Transparent film  (adaptic in undermining, black granufoam/ vac drape)   Periwound Care Dry periwound area maintained;Hydrocolloid barrier applied;Skin barrier film applied;Topical treatment applied  (mastisol for additional adhesive / hydrocolloid paste)   Dressing Change Due 04/11/24

## 2024-04-09 NOTE — DISCHARGE SUMMARY
[facility]  HOSPITAL MEDICINE - DISCHARGE SUMMARY    Patient Name: Antonio Galvan II  MRN: [id number]  Admission Date: 2/26/2024  Discharge Date: 04/09/2024  Hospital Length of Stay: 43 days  Discharge Provider: ANTHONY Vazquez  Primary Care Provider: Jennifer Fernando NP    HOSPITAL COURSE     58-year-old man with quadriplegic spinal paralysis and numerous decubitus ulcers who is followed by wound care is brought in today due to fever. He had wound cultures done 3 days ago that have grown Proteus mirabilis and Klebsiella pneumoniae. Sensitivities are not complete. The wounds were noted to have significant odor and heavy green drainage. In addition the patient had some nausea and vomiting and home health noted that his blood pressure was dipping. He was advised to come to the emergency room. Wound cultures were drawn from the right hip wound. His significant other reports that when the wound is pressed above pus comes out. Both Klebsiella and Proteus maybe treated with fluoroquinolones which are both IV and oral. The patient's significant other preferred if the patient came home so that she could attend to his wounds however the patient expressed a desire to be admitted. Patient did undergo a debridement with Dr. Ruvalcaba [phone]. He required 2u pRBC 02/28/2024 and 1u pRBC on 02/29/2024 with improvement of hemoglobin. Wound culture revealed VRE, Proteus, Pseudomonas, Klebsiella. H&H and platelets continue to decrease, suspect Zyvox induced myelosuppression. Appropriate antibiotics (Tobramycin and Zyvox) were initiated on 03/02/2024 therefore he has received 26 days of antibiotic therapy. Patient is noted to have chronic osteomyelitis therefore he would only require 2 weeks of antibiotic therapy for skin and soft tissue infection. Antibiotics discontinued on 03/28/2024 due to risk vs benefits of continuing antibiotic therapy in the setting of myelosuppression and continue with  aggressive wound care. Patient did require the addition of Diltiazem for atrial flutter rate control which was subsequently changed to Coreg due to hypotension. Patient did not tolerate correct well due to continued increased heart rate therefore he was transitioned back to diltiazem. Patient did have to receive another unit of PRBCs on 04/01/2024 due to decreasing hemoglobin and continued tachycardia which resolved after transfusion.  Patient was noted to have an elevated temperature on 04/07/2024 and started on empiric clindamycin 150 mg q.6 p.o. Urinalysis was abnormal resulting in reflex to urine culture which currently shows Gram-negative rods.  Suspect he patient may be colonized.  CXR revealed increased attenuation in the right lower lung zone concerning for infiltrate, but patient denies any coughing or shortness of breath.  Labs without leukocytosis.  We will continue the p.o. clindamycin upon discharge for completion of 7 day course.  Patient will be discharged home today with home health services after receiving wound VAC change.  Did continue to follow-up with wound care as directed upon discharge.    PHYSICAL EXAM     Most Recent Vital Signs:  Temp: 97.5 °F (36.4 °C) (04/09/24 0759)  Pulse: 93 (04/09/24 0759)  Resp: 18 (04/08/24 1935)  BP: 126/85 (04/09/24 0759)  SpO2: 99 % (04/09/24 0759)     GENERAL: In no acute distress, afebrile  HEENT:  Atraumatic, normocephalic, moist mucous membranes  CHEST: Clear to auscultation bilaterally  HEART: S1, S2, no appreciable murmur  ABDOMEN: Soft, nontender, BS +  MSK: Warm, no lower extremity edema, no clubbing or cyanosis  NEUROLOGIC: Alert and oriented x4, hemiplegia  INTEGUMENTARY:  See media for wounds  PSYCHIATRY:  Appropriate mood and affect    DISCHARGE DIAGNOSIS     Nonhealing chronic wound   VRE, Proteus, Pseudomonas, Klebsiella   Chronic osteomyelitis   S/p debridement with Dr. Jean on 02/27/2024  Zyvox and Tobramycin 03/02/2024-03/28/2024  History  of chronic osteomyelitis - only needs 2 weeks of antibiotic therapy for acute skin and soft tissue infection  Discontinue antibiotics 03/28/2024 due to risk vs benefits of continuing antibiotic therapy in the setting of myelosuppression and continue with aggressive wound care  Wound VAC and wound care   Appreciate dietary recommendations for supplements     Atrial flutter  Hypertension/Hypotension   Diltiazem started 3/16/2024-03/28/2024 and required multiple adjustments due to hypotension and tachycardia changed to Coreg  No improvement with Coreg, diltiazem resumed once again 3/31 and transitioned to long acting on 04/02/2024  No AC 2/2 patient request as he has has prior episodes of acute blood loss from wounds - aspirin d/c 02/28/2024  HR improved after blood transfusion 04/01/2024  Hypertensive episodes are associated with pain     Post op anemia   Thrombocytopenia   Myelosuppression 2/2 Zyvox   H&H improved s/p 2u pRBC 02/28/2024, 1u pRBC on 02/29/2024, 1u pRBC on 04/01/2024  No anticoagulation at this time - aspirin d/c 02/28/2024  Peripheral smear without significant findings      Elevated temperature   Gram-negative kiley UTI - possible colonization from patient report   Urine culture pending  CXR revealed increased attenuation in the RLL zone concerning for infiltrate, but patient denies any coughing or shortness of breath  Labs without leukocytosis, afebrile   Currently receiving empiric clindamycin 150mg Q6 p.o. - will continue upon for completion of 7 day course     Hyperglycemia/hypoglycemia in setting of DM  A1c 02/23/2024 8.5%  Reports taking 80u Levemir BID  Continue home Sitagliptin  Continue titrating insulin regimen as needed     GISSEL - improving   Hyperkalemia - resolved   24 hour urine potassium shows adequate excretion     Left upper extremity swelling - resolved   Ultrasound without DVT     Hx of: Neurogenic bladder, Quadriplegia  Changed suprapubic catheter 03/31/2024    Patient's screen for  food insecurity, housing instability, transportation needs, utility difficulties, and interpersonal safety. No needs identified   _____________________________________________________________________________    DISCHARGE MED REC     Current Discharge Medication List        START taking these medications    Details   clindamycin (CLEOCIN) 150 MG capsule Take 1 capsule (150 mg total) by mouth every 6 (six) hours. for 5 days  Qty: 20 capsule, Refills: 0      diltiaZEM (CARDIZEM CD) 120 MG Cp24 Take 1 capsule (120 mg total) by mouth once daily.  Qty: 30 capsule, Refills: 0      miconazole (MICOTIN) 2 % cream Apply topically 2 (two) times daily.  Qty: 57 g, Refills: 0           CONTINUE these medications which have CHANGED    Details   insulin detemir U-100 (LEVEMIR) 100 unit/mL injection Inject 15 Units into the skin every evening.  Qty: 4.5 mL, Refills: 0           CONTINUE these medications which have NOT CHANGED    Details   ascorbic acid, vitamin C, (VITAMIN C) 500 MG tablet Take 1 tablet (500 mg total) by mouth once daily.  Qty: 30 tablet, Refills: 0      collagenase (SANTYL) ointment Apply topically once daily.  Qty: 100 g, Refills: 0      ferrous sulfate 325 (65 FE) MG EC tablet Take 1 tablet (325 mg total) by mouth once daily.  Qty: 30 tablet, Refills: 0      JANUVIA 50 mg Tab Take 100 mg by mouth once daily.           STOP taking these medications       albuterol (PROVENTIL) 2.5 mg /3 mL (0.083 %) nebulizer solution Comments:   Reason for Stopping:         ALKALOL NASAL WASH Soln Comments:   Reason for Stopping:         aspirin (ECOTRIN) 81 MG EC tablet Comments:   Reason for Stopping:         bisacodyL (DULCOLAX) 10 mg Supp Comments:   Reason for Stopping:         budesonide (PULMICORT) 0.5 mg/2 mL nebulizer solution Comments:   Reason for Stopping:         carvediloL (COREG) 12.5 MG tablet Comments:   Reason for Stopping:         doxycycline (VIBRAMYCIN) 100 MG Cap Comments:   Reason for Stopping:          polyethylene glycol (GLYCOLAX) 17 gram PwPk Comments:   Reason for Stopping:              CONSULTS     Consults (From admission, onward)          Status Ordering Provider     [facility]  Once        Provider:  (Not yet assigned)    Acknowledged DARLIN FRY     Inpatient consult to Registered Dietitian/Nutritionist  Once        Provider:  (Not yet assigned)    Completed NICANOR REAL     Inpatient consult to Wound Care Physician  Once        Provider:  Keyonna Jean MD    Acknowledged NICANOR REAL          FOLLOW UP      Follow-up Information       Jennifer Fernando NP. Go on 4/15/2024.    Specialty: Family Medicine  Why: Follow up appointment with Jennifer Fernando NP on Monday, April 15, 2024 @ 1:40 pm.  Spoke to [facility].  Contact information:  [address] [place] [id number]  [phone]               [facility]. Go on 4/9/2024.    Specialty: Wound Care  Why: Follow up appointment with [facility] on Tuesday, April 9, 2024 @ 2:30 pm pending supervisor approval, due to the duration of time that is needed for the patient.  Contact information:  [address], [facility]  [facility] [address] [phone]  Additional information:  [facility]             [facility] Follow up.    Contact information:  [address], [address]  [place] [address]  Phone:[phone]  Fax:[phone]                         DISCHARGE INSTRUCTIONS     Explained in detail to the patient about the discharge plan, medications, and follow-up visits. Pt understands and agrees with the treatment plan.  Discharged Condition: stable  Diet as tolerated  Activities as tolerated  Discharge to: Home-Health Care [facility]    TIME SPENT ON DISCHARGE   35 minutes    ANTHONY Vazquez  Internal Medicine  Department of Hospital Medicine  [facility]    This document was created using  electronic dictation services.  Please excuse any errors that may have been made.  Contact me if any questions regarding documentation to clarify verbiage.

## 2024-04-09 NOTE — PLAN OF CARE
[facility]  Discharge Final Note    Primary Care Provider: Jennifer Fernando NP    Expected Discharge Date: 4/9/2024    Final Discharge Note (most recent)       Final Note - 04/09/24 1209          Final Note    Assessment Type Final Discharge Note     Anticipated Discharge Disposition [place] [facility]     What phone number can be called within the next 1-3 days to see how you are doing after discharge? [phone]     Hospital Resources/Appts/Education Provided Provided patient/caregiver with written discharge plan information        Post-Acute Status    Post-Acute Authorization Home Health     Home Health Status Set-up Complete/Auth obtained     Discharge Delays None known at this time                     Important Message from Medicare             Contact Info       Jennifer Fernando NP   Specialty: Family Medicine   Relationship: PCP - General    [address] [place] [address]   Phone: [phone]       Next Steps: Go on 4/15/2024    Instructions: Follow up appointment with Jennifer Fernando NP on Monday, April 15, 2024 @ 1:40 pm.  Spoke to [facility].    [facility]   Specialty: Wound Care    [facility], [address]  [facility] [address]   Phone: [phone]       Next Steps: Go on 4/9/2024    Instructions: Follow up appointment with [facility] on Tuesday, April 9, 2024 @ 2:30 pm pending supervisor approval, due to the duration of time that is needed for the patient.    [facility] [address], [address]  [place] [address]  Phone:[phone]  Fax:[phone]       Next Steps: Follow up

## 2024-04-09 NOTE — NURSING
Patient and patients mother were given discharge instructions. Both verbalized understanding. PICC line was removed. Patient was transferred into motorized chair into vehicle.

## 2024-04-09 NOTE — PLAN OF CARE
Problem: Adult Inpatient Plan of Care  Goal: Plan of Care Review  Outcome: Ongoing, Progressing  Flowsheets (Taken 4/8/2024 1938)  Plan of Care Reviewed With:   patient   caregiver  Goal: Patient-Specific Goal (Individualized)  Outcome: Ongoing, Progressing  Flowsheets (Taken 4/8/2024 1938)  Anxieties, Fears or Concerns: none verbalized  Individualized Care Needs: monitor CBGs, monitor vitals, maintain seal on wound vac, daily wound care, turn q2  Goal: Absence of Hospital-Acquired Illness or Injury  Outcome: Ongoing, Progressing  Intervention: Prevent Skin Injury  Flowsheets (Taken 4/8/2024 1938)  Skin Protection:   adhesive use limited   tubing/devices free from skin contact   incontinence pads utilized   transparent dressing maintained   skin sealant/moisture barrier applied  Intervention: Prevent and Manage VTE (Venous Thromboembolism) Risk  Flowsheets (Taken 4/8/2024 1938)  VTE Prevention/Management:   bleeding precations maintained   bleeding risk assessed   fluids promoted  Range of Motion: active ROM (range of motion) encouraged  Intervention: Prevent Infection  Flowsheets (Taken 4/8/2024 1938)  Infection Prevention:   cohorting utilized   environmental surveillance performed   equipment surfaces disinfected   rest/sleep promoted   hand hygiene promoted   single patient room provided   personal protective equipment utilized     Problem: Diabetes Comorbidity  Goal: Blood Glucose Level Within Targeted Range  Outcome: Ongoing, Progressing  Intervention: Monitor and Manage Glycemia  Flowsheets (Taken 4/8/2024 1938)  Glycemic Management:   blood glucose monitored   oral hydration promoted

## 2024-04-09 NOTE — PT/OT/SLP DISCHARGE
Physical Therapy Discharge Summary    Name: Antonio Galvan II  MRN: [id number]   Principal Problem: Infected pressure ulcer, unspecified pressure ulcer stage     Patient Discharged from acute Physical Therapy on .  Please refer to prior PT noted date on  for functional status.     Assessment:     Patient appropriate for care in another setting.    Objective:     GOALS:   Multidisciplinary Problems       Physical Therapy Goals          Problem: Physical Therapy    Goal Priority Disciplines Outcome Goal Variances Interventions   Physical Therapy Goal     PT, PT/OT Adequate for Care Transition     Description: Goals to be met by: Discharge     Pt to be seen for ROM tasks 1-2 x/day to ensure proper positioning can be obtained to overall reduce impacts of prolonged bed rest and skin break down    Progress pending healing stages of current wounds    Patient will increase functional independence with mobility by performin. Pt to tolerate PROM tasks to B UE and LE, while obtaining 25% improvement in range.   2. Sitting EOB to tolerance pending wound healing - with pt demonstrating normal BP- Goal Met  3. Progress to OOB into chair once able. - Met  4. Pt to tolerate up to 3 hours in power chair - Met  5. Caregivers to be able to use tess with 1-2 people.                          Reasons for Discontinuation of Therapy Services  Transfer to alternate level of care. and Satisfactory goal achievement.      Plan:     Patient Discharged to: Home with Home Health Service.      2024

## 2024-04-10 ENCOUNTER — PATIENT OUTREACH (OUTPATIENT)
Dept: ADMINISTRATIVE | Facility: CLINIC | Age: 59
End: 2024-04-10
Payer: MEDICARE

## 2024-04-10 NOTE — PROGRESS NOTES
C3 nurse spoke with Antonio Galvan II for a TCC post hospital discharge follow up call. The patient has a scheduled HOSFU appointment with Jennifer Fernando NP on 4/15/24 @ 1:40.  The patient stated he is taking 70units of basiglar insulin daily, zyrtec daily and immodium twice daily.  Medication unable to be reconciled as it is not on the patient's medication list.

## 2024-04-11 ENCOUNTER — DOCUMENTATION ONLY (OUTPATIENT)
Dept: CASE MANAGEMENT | Facility: HOSPITAL | Age: 59
End: 2024-04-11
Payer: MEDICARE

## 2024-04-11 NOTE — PROGRESS NOTES
Received results of patient's urine culture obtained during his last admission here growing Pseudomonas aeruginosa sensitive only to tobramycin.  We have called microbiology to further evaluate if it has any further sensitivities.  However review of the patient's record reveals he had Pseudomonas aeruginosa in a urine culture collected in 2023.  Given that the patient has an indwelling suprapubic catheter I suspect this is more than likely colonization.  Patient asymptomatic at time of discharge.  I called his PCP Jennifer Fernando and discussed all of the above, I do not suspect that he requires additional treatment at this time however when she has follow-up with him on 04/15/2024 if they have any further concerns we are happy and available to address inpatient.  Further sensitivity profile expected to result on 04/12/2024 per microbiology at [facility].

## 2024-04-11 NOTE — PROGRESS NOTES
Inpatient unit staff were to follow final urine culture results and ensure that treatment was initiated based on physician orders on 04/10/2024. On 04/11/2024, it was brought to case management's attention that this was not completed on 04/10/2024. At this time case management discussed CRE urine culture results with physician who requested we see if patient was colonized with this bacteria. Spoke with Taurus with Jennifer Fernando NP's office concerning previous urine culture results. She stated that all urine culture results that they have are from [facility] so we would have information on if he is colonized. Notified Dr. Reyes who stated she reviewed chart and patient did have this bacteria in urine in 2023 and she spoke with patient's PCP Jennifer Fernando NP and advised her of the results. She stated that the patient is asymptomatic at this time so no need to treat the bacteria but that Jennifer Fernando NP will monitor patient and if he becomes symptomatic she will notify us in case he needs to be re-admitted for IV abx.      Interval History: pt c/o 8/10 pain to RLE, knee to ankle     Review of Systems   Constitutional: Positive for chills and fatigue. Negative for appetite change, diaphoresis and fever.   HENT: Negative for congestion, hearing loss, sore throat, tinnitus and trouble swallowing.    Eyes: Negative for photophobia, discharge, itching and visual disturbance.   Respiratory: Negative for apnea, cough, wheezing and stridor.    Cardiovascular: Negative for chest pain, palpitations and leg swelling.   Gastrointestinal: Negative for abdominal distention, abdominal pain, blood in stool, constipation, diarrhea and nausea.   Endocrine: Negative for polydipsia, polyphagia and polyuria.   Genitourinary: Negative for difficulty urinating, dysuria, flank pain and frequency.   Musculoskeletal: Positive for joint swelling. Negative for arthralgias and neck stiffness.   Skin: Negative for color change, rash and wound.   Neurological: Negative for dizziness, tremors, seizures, light-headedness, numbness and headaches.   Hematological: Negative for adenopathy.   Psychiatric/Behavioral: Negative for hallucinations and self-injury.     Objective:     Vital Signs (Most Recent):  Temp: 97.1 °F (36.2 °C) (10/15/17 1158)  Pulse: 60 (10/15/17 1158)  Resp: 20 (10/15/17 1158)  BP: (!) 109/53 (10/15/17 1158)  SpO2: (!) 93 % (10/15/17 1158) Vital Signs (24h Range):  Temp:  [97.1 °F (36.2 °C)-105 °F (40.6 °C)] 97.1 °F (36.2 °C)  Pulse:  [] 60  Resp:  [17-22] 20  SpO2:  [91 %-98 %] 93 %  BP: (100-141)/(52-64) 109/53     Weight: 81.6 kg (179 lb 14.3 oz)  Body mass index is 23.73 kg/m².    Intake/Output Summary (Last 24 hours) at 10/15/17 1254  Last data filed at 10/15/17 0949   Gross per 24 hour   Intake             1160 ml   Output                0 ml   Net             1160 ml      Physical Exam   Constitutional: He appears well-developed and well-nourished. He is cooperative.   HENT:   Head: Normocephalic and atraumatic.   Eyes: Conjunctivae and  lids are normal.   Neck: Full passive range of motion without pain. Neck supple. No JVD present. No edema present. No thyroid mass present.   Cardiovascular: S1 normal, S2 normal and intact distal pulses.    No murmur heard.  Pulmonary/Chest: Effort normal.   Abdominal: Soft. Bowel sounds are normal. He exhibits no distension and no abdominal bruit. There is no splenomegaly or hepatomegaly. There is no tenderness. There is no CVA tenderness.   Musculoskeletal: Normal range of motion.   Lymphadenopathy:     He has no cervical adenopathy.     He has no axillary adenopathy.   Neurological: He is alert. He has normal reflexes. He displays no tremor. He displays no seizure activity.   Skin: Skin is warm, dry and intact.   Psychiatric: He has a normal mood and affect. His speech is normal. Thought content normal. Cognition and memory are normal.       Significant Labs:   Blood Culture:   Recent Labs  Lab 10/14/17  1405 10/14/17  1420   LABBLOO Gram stain aer bottle: Gram positive cocci in pairs AND  Gram positive   cocci in chains resembling Strep   Positive results previously called 10/15/2017  05:01  STREPTOCOCCUS AGALACTIAE (GROUP B)Susceptibility pendingBeta-hemolytic streptococci are routinely susceptible to penicillins,cephalosporins and carbapenems. Gram stain aer bottle: Gram positive cocci in pairs AND  Gram positive   cocci in chains resembling Strep   Results called to and read back by: RICK SOSA [facility]  10/15/2017    05:00  STREPTOCOCCUS AGALACTIAE (GROUP B)Beta-hemolytic streptococci are routinely susceptible to penicillins,cephalosporins and carbapenems.     CBC:   Recent Labs  Lab 10/14/17  1346 10/15/17  0445   WBC 10.76 8.00   HGB 11.6* 10.2*   HCT 34.7* 30.7*   PLT 75* 67*     CMP:   Recent Labs  Lab 10/14/17  1346 10/15/17  0445    139   K 3.9 4.1   CL 97 96   CO2 30* 33*   * 149*   BUN 17 29*   CREATININE 2.5* 3.5*   CALCIUM 9.2 8.3*   PROT 8.5*  --    ALBUMIN 3.6  --     BILITOT 0.8  --    ALKPHOS 87  --    AST 20  --    ALT 13  --    ANIONGAP 14 10   EGFRNONAA 25* 16*     POCT Glucose:   Recent Labs  Lab 10/14/17  2212 10/15/17  0805 10/15/17  1156   POCTGLUCOSE 206* 92 94     Urine Culture:   Recent Labs  Lab 10/14/17  1440   LABURIN No growth to date       Significant Imaging: I have reviewed all pertinent imaging results/findings within the past 24 hours.

## 2024-04-12 LAB — BACTERIA UR CULT: ABNORMAL

## 2024-04-17 ENCOUNTER — TELEPHONE (OUTPATIENT)
Dept: WOUND CARE | Facility: HOSPITAL | Age: 59
End: 2024-04-17
Payer: MEDICARE

## 2024-04-17 NOTE — TELEPHONE ENCOUNTER
Received phone call from Megan Russell on 4/16/24 approximately 330pm regarding green drainage today from L hip ulceration.   Reports this is new and different then yesterday.  Contacted Yanira MADDOX at [facility] regarding drainage.   Report given to Dr. Barcenas, new verbal orders obtained for HH to utilize topical antibiotic compound with mesalt daily to L hip until pt returns for MD visit in wound clinic next week.   Telephone order given to Yanira at 1553 for modification to dressing change.  Notified Megan Russell per telephone at 1558 of new orders.   Verbalized understanding.

## 2024-04-23 ENCOUNTER — HOSPITAL ENCOUNTER (OUTPATIENT)
Dept: WOUND CARE | Facility: HOSPITAL | Age: 59
Discharge: HOME | End: 2024-04-23
Attending: STUDENT IN AN ORGANIZED HEALTH CARE EDUCATION/TRAINING PROGRAM
Payer: MEDICARE

## 2024-04-23 VITALS
TEMPERATURE: 98 F | OXYGEN SATURATION: 96 % | RESPIRATION RATE: 20 BRPM | HEART RATE: 70 BPM | SYSTOLIC BLOOD PRESSURE: 126 MMHG | DIASTOLIC BLOOD PRESSURE: 70 MMHG

## 2024-04-23 DIAGNOSIS — G82.50 QUADRIPLEGIA: Chronic | ICD-10-CM

## 2024-04-23 DIAGNOSIS — L89.613 PRESSURE INJURY OF RIGHT HEEL, STAGE 3: ICD-10-CM

## 2024-04-23 DIAGNOSIS — R82.90 ABNORMAL URINALYSIS: Primary | ICD-10-CM

## 2024-04-23 DIAGNOSIS — L89.154 PRESSURE INJURY OF SACRAL REGION, STAGE 4: ICD-10-CM

## 2024-04-23 DIAGNOSIS — D50.0 CHRONIC BLOOD LOSS ANEMIA: Chronic | ICD-10-CM

## 2024-04-23 DIAGNOSIS — L89.314 PRESSURE INJURY OF RIGHT ISCHIUM, STAGE 4: ICD-10-CM

## 2024-04-23 DIAGNOSIS — M86.9 OSTEOMYELITIS, UNSPECIFIED SITE, UNSPECIFIED TYPE: Chronic | ICD-10-CM

## 2024-04-23 DIAGNOSIS — S71.002S OPEN WOUND OF LEFT HIP, SEQUELA: ICD-10-CM

## 2024-04-23 PROCEDURE — 27000999 HC MEDICAL RECORD PHOTO DOCUMENTATION

## 2024-04-23 PROCEDURE — 11045 DBRDMT SUBQ TISS EACH ADDL: CPT | Mod: ,,, | Performed by: PEDIATRICS

## 2024-04-23 PROCEDURE — 11042 DBRDMT SUBQ TIS 1ST 20SQCM/<: CPT | Mod: ,,, | Performed by: PEDIATRICS

## 2024-04-23 PROCEDURE — 11042 DBRDMT SUBQ TIS 1ST 20SQCM/<: CPT

## 2024-04-23 PROCEDURE — 99214 OFFICE O/P EST MOD 30 MIN: CPT | Mod: 25

## 2024-04-23 PROCEDURE — 11045 DBRDMT SUBQ TISS EACH ADDL: CPT

## 2024-04-23 PROCEDURE — 97606 NEG PRS WND THER DME>50 SQCM: CPT

## 2024-04-23 PROCEDURE — 99213 OFFICE O/P EST LOW 20 MIN: CPT | Mod: 25,ICN,, | Performed by: PEDIATRICS

## 2024-04-23 RX ORDER — DESONIDE 0.5 MG/G
1 OINTMENT TOPICAL 2 TIMES DAILY
COMMUNITY
Start: 2024-04-05

## 2024-04-23 RX ORDER — FLUCONAZOLE 150 MG/1
150 TABLET ORAL DAILY
Qty: 7 TABLET | Refills: 0 | Status: SHIPPED | OUTPATIENT
Start: 2024-04-23 | End: 2024-04-30

## 2024-04-23 NOTE — PATIENT INSTRUCTIONS
Cleanse wound with wound cleanser  Periwound care: prep periwound with skin prep, allow to dry , frame periwound with hydrocolloid, use hydrocolloid ring in creases ,  use mastisol as additional adhesive     Sacrum / R lateral buttock:  ( May apply crushed flagyl with  vac dressing change for odor control)  Primary dressing: santyl / hydrofera blue /black granufoam to sacrum,  Hydrofera blue rope to undermining area of R lateral buttock, black granufoam   Secondary dressing: wound vac drape.   Bridge wounds and trac pad to lateral hip  NPWT continuous -125mmHg  Frequency: Tuesday / Thursday / Saturday dressing changes  Offloading: turn q 2 hrs, offload, specialty mattress, chair cushion    L hip:   Cleanse with wound cleanser  Pat dry, skin prep periwound,   Apply topical abx compound to wound bed, pack lightly with mesalt, cover with gauze, abd prn, medfix tape.  Change daily    R Heel:   Apply skin prep to healed R heel , cover with foam dressing, change every 3 days    Continue to offload:      Follow-up: 1 week in wound care center  [facility]: [facility]    Go to [facility] for lab work

## 2024-04-23 NOTE — PROGRESS NOTES
Subjective:       Patient ID: Antonio Galvan II is a 58 y.o. male.    Chief Complaint: Pressure Ulcer (L hip, Sacrum, R lateral buttock pressure ulcers.   R heel pressure ulcer.  Discharged from hospital after debridement and treatment for osteomyelitis.    Wound vac in use to Sacral and R buttock ulceration.    Here for re-evaluation and treatment with md.   )    HPI  Review of Systems      Objective:      Temp:  [97.7 °F (36.5 °C)]   Pulse:  [70]   Resp:  [20]   BP: (126)/(70)   SpO2:  [96 %]   Physical Exam       Negative Pressure Wound Therapy  02/29/24 1227 (Active)   02/29/24 1227   Side:    Orientation:    Location: Sacral Spine   Additional Comments: 2 sites NPWT - Sacrum / R lateral Buttock   Removal Indication and Assessment:    Location:    SDO Location:    NPWT Type Vacuum Therapy 04/23/24 1557   Therapy Setting NPWT Continuous therapy 04/23/24 1557   Pressure Setting NPWT 125 mmHg 04/23/24 1557   Therapy Interventions NPWT Canister changed;Dressing changed;Seal intact 04/23/24 1557   Sponges Inserted NPWT Black;4;Other 04/23/24 1557   Sponges Removed NPWT Black;4 04/23/24 1557            Altered Skin Integrity 05/06/22 1140 Right Heel  Full thickness tissue loss. Subcutaneous fat may be visible but bone, tendon or muscle are not exposed (Active)   05/06/22 1140   Altered Skin Integrity Present on Admission - Did Patient arrive to the hospital with altered skin?: yes   Side: Right   Orientation:    Location: Heel   Wound Number:    Is this injury device related?: No   Primary Wound Type:    Description of Altered Skin Integrity: Full thickness tissue loss. Subcutaneous fat may be visible but bone, tendon or muscle are not exposed   Ankle-Brachial Index:    Pulses:    Removal Indication and Assessment:    Wound Outcome:    (Retired) Wound Length (cm):    (Retired) Wound Width (cm):    (Retired) Depth (cm):    Wound Description (Comments):    Removal Indications:    Wound Image   04/23/24 1557    Dressing Appearance Intact;Dry;Clean 04/23/24 1557   Drainage Amount None 04/23/24 1557   Appearance Closed/resurfaced;Dry 04/23/24 1557   Periwound Area Scar tissue;Dry 04/23/24 1557   Wound Length (cm) 0 cm 04/23/24 1557   Wound Width (cm) 0 cm 04/23/24 1557   Wound Depth (cm) 0 cm 04/23/24 1557   Wound Volume (cm^3) 0 cm^3 04/23/24 1557   Wound Surface Area (cm^2) 0 cm^2 04/23/24 1557   Care Cleansed with:;Antimicrobial agent;Wound cleanser;Applied:;Skin Barrier 04/23/24 1557   Dressing Applied;Silicone;Foam 04/23/24 1557   Periwound Care Dry periwound area maintained;Skin barrier film applied 04/23/24 1557   Off Loading Other (see comments) 04/23/24 1557            Altered Skin Integrity 01/12/23 1530 Sacral spine Full thickness tissue loss with exposed bone, tendon, or muscle. Often includes undermining and tunneling. May extend into muscle and/or supporting structures. (Active)   01/12/23 1530   Altered Skin Integrity Present on Admission - Did Patient arrive to the hospital with altered skin?: yes   Side:    Orientation:    Location: Sacral spine   Wound Number:    Is this injury device related?:    Primary Wound Type:    Description of Altered Skin Integrity: Full thickness tissue loss with exposed bone, tendon, or muscle. Often includes undermining and tunneling. May extend into muscle and/or supporting structures.   Ankle-Brachial Index:    Pulses:    Removal Indication and Assessment:    Wound Outcome:    (Retired) Wound Length (cm):    (Retired) Wound Width (cm):    (Retired) Depth (cm):    Wound Description (Comments):    Removal Indications:    Wound Image    04/23/24 1557   Dressing Appearance Intact;Moist drainage 04/23/24 1557   Drainage Amount Large 04/23/24 1557   Drainage Characteristics/Odor Serosanguineous;Sanguineous 04/23/24 1557   Appearance Red;Granulating;Tan;Slough;Fibrin 04/23/24 1557   Tissue loss description Full thickness 04/23/24 1557   Red (%), Wound Tissue Color 60 % 04/23/24  1557   Yellow (%), Wound Tissue Color 40 % 04/23/24 1557   Periwound Area Intact;Moist 04/23/24 1557   Wound Edges Irregular 04/23/24 1557   Wound Length (cm) 8.5 cm 04/23/24 1557   Wound Width (cm) 7 cm 04/23/24 1557   Wound Depth (cm) 2.3 cm 04/23/24 1557   Wound Volume (cm^3) 136.85 cm^3 04/23/24 1557   Wound Surface Area (cm^2) 59.5 cm^2 04/23/24 1557   Care Cleansed with:;Antimicrobial agent;Wound cleanser 04/23/24 1557   Dressing Applied;Methylene blue/gentian violet;Foam;Other (comment) 04/23/24 1557   Periwound Care Skin barrier film applied;Hydrocolloid barrier applied 04/23/24 1557   Off Loading Other (see comments) 04/23/24 1557            Incision/Site 08/16/23 2011 Left Hip lateral (Active)   08/16/23 2011   Present Prior to Hospital Arrival?:    Side: Left   Location: Hip   Orientation: lateral   Incision Type:    Closure Method:    Additional Comments:    Removal Indication and Assessment:    Wound Outcome:    Removal Indications:    Wound Image   04/23/24 1557   Dressing Appearance Intact;Moist drainage 04/23/24 1557   Drainage Amount Small 04/23/24 1557   Drainage Characteristics/Odor Serous;No odor;Bleeding controlled 04/23/24 1557   Appearance Red;Granulating;White;Fibrin 04/23/24 1557   Red (%), Wound Tissue Color 95 % 04/23/24 1557   Periwound Area Scar tissue 04/23/24 1557   Wound Edges Defined 04/23/24 1557   Wound Length (cm) 2.5 cm 04/23/24 1557   Wound Width (cm) 2.5 cm 04/23/24 1557   Wound Depth (cm) 2.5 cm 04/23/24 1557   Wound Volume (cm^3) 15.625 cm^3 04/23/24 1557   Wound Surface Area (cm^2) 6.25 cm^2 04/23/24 1557   Undermining (depth (cm)/location) 360 degrees / 2.5cm @ 3 o'clock 04/23/24 1557   Care Cleansed with:;Antimicrobial agent;Wound cleanser 04/23/24 1557   Dressing Applied;Sodium chloride impregnated;Gauze;Absorptive Pad;Other (comment) 04/23/24 5557   Periwound Care Dry periwound area maintained;Skin barrier film applied 04/23/24 3953            Incision/Site 02/27/24 6518  Right Buttocks lateral (Active)   02/27/24 1450   Present Prior to Hospital Arrival?:    Side: Right   Location: Buttocks   Orientation: lateral   Incision Type:    Closure Method:    Additional Comments: mesalt, quick clot, gauze, abdomen pad, and tape   Removal Indication and Assessment:    Wound Outcome:    Removal Indications:    Wound Image   04/23/24 1557   Incision WDL WDL 04/23/24 1557   Dressing Appearance Intact;Clean;Dry 04/23/24 1557   Drainage Amount Small 04/23/24 1557   Drainage Characteristics/Odor Serosanguineous;No odor;Bleeding controlled 04/23/24 1557   Appearance Pink;Red;Granulating 04/23/24 1557   Red (%), Wound Tissue Color 100 % 04/23/24 1557   Periwound Area Moist 04/23/24 1557   Wound Edges Defined 04/23/24 1557   Wound Length (cm) 6 cm 04/23/24 1557   Wound Width (cm) 12 cm 04/23/24 1557   Wound Depth (cm) 0.8 cm 04/23/24 1557   Wound Volume (cm^3) 57.6 cm^3 04/23/24 1557   Wound Surface Area (cm^2) 72 cm^2 04/23/24 1557   Undermining (depth (cm)/location) from 12 to 2 o'clock / 4.5cm at 1 o'clock 04/23/24 1557   Care Cleansed with:;Antimicrobial agent;Wound cleanser 04/23/24 1557   Dressing Applied;Methylene blue/gentian violet;Foam;Other (comment) 04/23/24 1557   Periwound Care Hydrocolloid barrier applied;Dry periwound area maintained;Skin barrier film applied;Other (see comments) 04/23/24 1557         Assessment:     Patient was in the hospital for osteomyelitis.  Patient is currently with wound VAC to sacrum and right buttocks ulceration.  Right heel wound is completely healed.  Sacral spine area is reddened granulating with slough and fibrin material.  This is 8.5 cm by 7 cm with a depth of 2.3 cm.  Because of this an excisional debridement was performed.  See procedure note.  Afterwards Santyl and Hydrofera Blue was applied and wound VAC.  Right buttocks wound is 6 cm x 12 cm with a depth of 0.8 cm there is undermining of 4.5 cm at 1 o'clock.  Wound VAC was applied.  After  Hydrofera Blue.  Left lateral hip wound is 2.5 cm x 2.5 cm with a depth of 2.5 cm.  Also some fibrin.  Area was cleaned Mesalt was applied to the area.  Fluconazole was because of fungal rash of the back.  Also CBC and pre-albumin was ordered because of anemia and poor nutrition.  Dressings will be changed as below and patient will return in 1 week for MD visit.    ICD-10-CM ICD-9-CM   1. Abnormal urinalysis  R82.90 791.9   2. Chronic blood loss anemia  D50.0 280.0   3. Osteomyelitis, unspecified site, unspecified type  M86.9 730.20   4. Quadriplegia  G82.50 344.00   5. Pressure injury of right heel, stage 3  L89.613 707.07     707.23   6. Pressure injury of sacral region, stage 4  L89.154 707.03     707.24   7. Pressure injury of right ischium, stage 4  L89.314 707.05     707.24   8. Open wound of left hip, sequela  S71.002S 906.1         Plan:   Tissue pathology and/or culture taken:  [] Yes [x] No   Sharp debridement performed:   [x] Yes [] No   Labs ordered this visit:   [x] Yes [] No   Imaging ordered this visit:   [] Yes [x] No           Orders Placed This Encounter   Procedures    Debridement     This order was created via procedure documentation     Standing Status:   Standing     Number of Occurrences:   1    CBC Auto Differential     Standing Status:   Future     Number of Occurrences:   1     Standing Expiration Date:   7/22/2025    Prealbumin     Standing Status:   Future     Number of Occurrences:   1     Standing Expiration Date:   7/22/2025    Change dressing     Cleanse wound with wound cleanser  Periwound care: prep periwound with skin prep, allow to dry , frame periwound with hydrocolloid, use hydrocolloid ring in creases ,  use mastisol as additional adhesive     Sacrum / R lateral buttock:  ( May apply crushed flagyl with  vac dressing change for odor control)  Primary dressing: santyl / hydrofera blue /black granufoam to sacrum,  Hydrofera blue rope to undermining area of R lateral buttock,  black granufoam   Secondary dressing: wound vac drape.   Bridge wounds and trac pad to lateral hip  NPWT continuous -125mmHg  Frequency: Tuesday / Thursday / Saturday dressing changes  Offloading: turn q 2 hrs, offload, specialty mattress, chair cushion    L hip:   Cleanse with wound cleanser  Pat dry, skin prep periwound,   Apply topical abx compound to wound bed, pack lightly with mesalt, cover with gauze, abd prn, medfix tape.  Change daily    R Heel:   Apply skin prep to healed R heel , cover with foam dressing, change every 3 days    Continue to offload:      Follow-up: 1 week in wound care center  [facility]: [facility]    Go to [facility] for lab work        Follow up in about 1 week (around 4/30/2024) for md visit .

## 2024-04-23 NOTE — PROCEDURES
"Debridement    Date/Time: 4/23/2024 1:47 PM    Performed by: Ronald Barcenas MD  Authorized by: Ronald Barcenas MD  Associated wounds:        Altered Skin Integrity 01/12/23 1530 Sacral spine Full thickness tissue loss with exposed bone, tendon, or muscle. Often includes undermining and tunneling. May extend into muscle and/or supporting structures.  Time out: Immediately prior to procedure a "time out" was called to verify the correct patient, procedure, equipment, support staff and site/side marked as required.    Consent Done?:  Yes (Written) and Yes (Verbal)    Preparation: Patient was prepped and draped with clean technique    Local anesthesia used?: No      Wound Details:    Location:  Sacrum    Type of Debridement:  Excisional       Length (cm):  8.5       Area (sq cm):  59.5       Width (cm):  7       Percent Debrided (%):  100       Depth (cm):  2.5       Total Area Debrided (sq cm):  59.5    Depth of debridement:  Subcutaneous tissue    Tissue debrided:  Adipose and Subcutaneous    Devitalized tissue debrided:  Fibrin, Necrotic/Eschar and Slough    Instruments:  Blade and Curette  Bleeding:  Heavy  Hemostasis Achieved: Yes  Method Used:  Pressure  Patient tolerance:  Patient tolerated the procedure well with no immediate complications  "

## 2024-04-30 ENCOUNTER — HOSPITAL ENCOUNTER (OUTPATIENT)
Dept: WOUND CARE | Facility: HOSPITAL | Age: 59
Discharge: HOME | End: 2024-04-30
Attending: STUDENT IN AN ORGANIZED HEALTH CARE EDUCATION/TRAINING PROGRAM
Payer: MEDICARE

## 2024-04-30 VITALS
SYSTOLIC BLOOD PRESSURE: 92 MMHG | TEMPERATURE: 98 F | DIASTOLIC BLOOD PRESSURE: 65 MMHG | RESPIRATION RATE: 18 BRPM | HEART RATE: 67 BPM | OXYGEN SATURATION: 94 %

## 2024-04-30 DIAGNOSIS — S71.002S OPEN WOUND OF LEFT HIP, SEQUELA: ICD-10-CM

## 2024-04-30 DIAGNOSIS — L89.154 PRESSURE INJURY OF SACRAL REGION, STAGE 4: Primary | ICD-10-CM

## 2024-04-30 DIAGNOSIS — L89.314 PRESSURE INJURY OF RIGHT ISCHIUM, STAGE 4: ICD-10-CM

## 2024-04-30 PROCEDURE — 99213 OFFICE O/P EST LOW 20 MIN: CPT | Mod: ,,, | Performed by: PEDIATRICS

## 2024-04-30 PROCEDURE — 27000999 HC MEDICAL RECORD PHOTO DOCUMENTATION

## 2024-04-30 PROCEDURE — 99213 OFFICE O/P EST LOW 20 MIN: CPT

## 2024-04-30 PROCEDURE — 97606 NEG PRS WND THER DME>50 SQCM: CPT

## 2024-04-30 NOTE — PROGRESS NOTES
Subjective:       Patient ID: Antonio Galvan II is a 58 y.o. male.    Chief Complaint: Pressure Ulcer (L hip, Sacrum, and R lateral buttock pressure ulcers. Here for re-evaluation and treatment with md.   / /)    HPI  Review of Systems      Objective:      Temp:  [97.8 °F (36.6 °C)]   Pulse:  [67]   Resp:  [18]   BP: (92)/(65)   SpO2:  [94 %]   Physical Exam       Negative Pressure Wound Therapy  02/29/24 1227 (Active)   02/29/24 1227   Side:    Orientation:    Location: Sacral Spine   Additional Comments: 2 sites NPWT - Sacrum / R lateral Buttock   Removal Indication and Assessment:    Location:    SDO Location:    NPWT Type Vacuum Therapy 04/30/24 1515   Therapy Setting NPWT Continuous therapy 04/30/24 1515   Pressure Setting NPWT 125 mmHg 04/30/24 1515   Therapy Interventions NPWT Canister changed;Dressing changed;Seal intact 04/30/24 1515   Sponges Inserted NPWT Black;4;Other 04/30/24 1515   Sponges Removed NPWT Black;4;Other 04/30/24 1515            Altered Skin Integrity 01/12/23 1530 Sacral spine Full thickness tissue loss with exposed bone, tendon, or muscle. Often includes undermining and tunneling. May extend into muscle and/or supporting structures. (Active)   01/12/23 1530   Altered Skin Integrity Present on Admission - Did Patient arrive to the hospital with altered skin?: yes   Side:    Orientation:    Location: Sacral spine   Wound Number:    Is this injury device related?:    Primary Wound Type:    Description of Altered Skin Integrity: Full thickness tissue loss with exposed bone, tendon, or muscle. Often includes undermining and tunneling. May extend into muscle and/or supporting structures.   Ankle-Brachial Index:    Pulses:    Removal Indication and Assessment:    Wound Outcome:    (Retired) Wound Length (cm):    (Retired) Wound Width (cm):    (Retired) Depth (cm):    Wound Description (Comments):    Removal Indications:    Wound Image   04/30/24 1515   Dressing Appearance Intact;Moist  drainage 04/30/24 1515   Drainage Amount Large 04/30/24 1515   Drainage Characteristics/Odor Serosanguineous 04/30/24 1515   Appearance Red;Granulating;Tan;Slough;Fibrin 04/30/24 1515   Tissue loss description Full thickness 04/30/24 1515   Red (%), Wound Tissue Color 65 % 04/30/24 1515   Yellow (%), Wound Tissue Color 35 % 04/30/24 1515   Periwound Area Intact;Moist;Excoriated 04/30/24 1515   Wound Edges Irregular 04/30/24 1515   Wound Length (cm) 8 cm 04/30/24 1515   Wound Width (cm) 6.5 cm 04/30/24 1515   Wound Depth (cm) 2 cm 04/30/24 1515   Wound Volume (cm^3) 104 cm^3 04/30/24 1515   Wound Surface Area (cm^2) 52 cm^2 04/30/24 1515   Care Cleansed with:;Antimicrobial agent;Wound cleanser 04/30/24 1515   Dressing Applied;Methylene blue/gentian violet;Foam 04/30/24 1515   Periwound Care Skin barrier film applied;Hydrocolloid barrier applied 04/30/24 1515   Off Loading Other (see comments) 04/30/24 1515            Incision/Site 08/16/23 2011 Left Hip lateral (Active)   08/16/23 2011   Present Prior to Hospital Arrival?:    Side: Left   Location: Hip   Orientation: lateral   Incision Type:    Closure Method:    Additional Comments:    Removal Indication and Assessment:    Wound Outcome:    Removal Indications:    Wound Image   04/30/24 1515   Dressing Appearance Intact;Moist drainage 04/30/24 1515   Drainage Amount Small 04/30/24 1515   Drainage Characteristics/Odor Serous 04/30/24 1515   Appearance Red;Granulating;Tan;Fibrin 04/30/24 1515   Red (%), Wound Tissue Color 95 % 04/30/24 1515   Periwound Area Moist;[place];Scar tissue 04/30/24 1515   Wound Edges Defined;Rolled/closed 04/30/24 1515   Wound Length (cm) 2.3 cm 04/30/24 1515   Wound Width (cm) 2.3 cm 04/30/24 1515   Wound Depth (cm) 1.5 cm 04/30/24 1515   Wound Volume (cm^3) 7.935 cm^3 04/30/24 1515   Wound Surface Area (cm^2) 5.29 cm^2 04/30/24 1515   Undermining (depth (cm)/location) 360 degrees / 3.5cm @ 11 o'clock 04/30/24 1515   Care Cleansed  with:;Antimicrobial agent;Wound cleanser 04/30/24 1515   Dressing Applied;Sodium chloride impregnated;Gauze;Absorptive Pad;Other (comment) 04/30/24 1515   Periwound Care Dry periwound area maintained;Skin barrier film applied 04/30/24 1515            Incision/Site 02/27/24 1450 Right Buttocks lateral (Active)   02/27/24 1450   Present Prior to Hospital Arrival?:    Side: Right   Location: Buttocks   Orientation: lateral   Incision Type:    Closure Method:    Additional Comments: mesalt, quick clot, gauze, abdomen pad, and tape   Removal Indication and Assessment:    Wound Outcome:    Removal Indications:    Wound Image   04/30/24 1515   Incision WDL WDL 04/30/24 1515   Dressing Appearance Intact 04/30/24 1515   Drainage Amount Small 04/30/24 1515   Drainage Characteristics/Odor Serosanguineous 04/30/24 1515   Appearance Red;Granulating;Tan;Fibrin 04/30/24 1515   Red (%), Wound Tissue Color 100 % 04/30/24 1515   Periwound Area Moist;[place] 04/30/24 1515   Wound Edges Defined 04/30/24 1515   Wound Length (cm) 6.5 cm 04/30/24 1515   Wound Width (cm) 11.2 cm 04/30/24 1515   Wound Depth (cm) 0.7 cm 04/30/24 1515   Wound Volume (cm^3) 50.96 cm^3 04/30/24 1515   Wound Surface Area (cm^2) 72.8 cm^2 04/30/24 1515   Undermining (depth (cm)/location) from 1 o'clock to 2 o'clock; 4cm 04/30/24 1515   Care Cleansed with:;Antimicrobial agent;Wound cleanser 04/30/24 1515   Dressing Applied;Methylene blue/gentian violet;Foam;Other (comment) 04/30/24 1515   Periwound Care Hydrocolloid barrier applied;Dry periwound area maintained;Skin barrier film applied 04/30/24 1515         Assessment:     Sacral wound is 8 cm x 6.5 cm with a depth of 2 cm.  This is reddened granulating with slough and fibrin.  Santyl and Hydrofera Blue was applied and wound VAC.  Right buttocks has granulating tissue with some undermining.  Hydrofera Blue was applied to the undermining area.  Along with wound VAC.  Left hip is reddened granulating.  This is 2.3 cm  x 2.3 cm with a depth of 1.5 cm.  There is undermining of 3.5 cm.  Topical antibiotic was applied and packed with Mesalt.  Right heel is healed there is Tuesday Thursday and Saturday dressing changes and patient will return in 1 week for MD visit.    ICD-10-CM ICD-9-CM   1. Pressure injury of sacral region, stage 4  L89.154 707.03     707.24   2. Pressure injury of right ischium, stage 4  L89.314 707.05     707.24   3. Open wound of left hip, sequela  S71.002S 906.1         Plan:   Tissue pathology and/or culture taken:  [] Yes [x] No   Sharp debridement performed:   [] Yes [x] No   Labs ordered this visit:   [] Yes [x] No   Imaging ordered this visit:   [] Yes [x] No           Orders Placed This Encounter   Procedures    Change dressing     Cleanse wound with wound cleanser  Periwound care: prep periwound with skin prep, allow to dry , frame periwound with hydrocolloid, use hydrocolloid ring in creases ,  use mastisol as additional adhesive      Sacrum / R lateral buttock:  ( May apply crushed flagyl with  vac dressing change for odor control)  Primary dressing: santyl / hydrofera blue /black granufoam to sacrum,  Hydrofera blue rope to undermining area of R lateral buttock, black granufoam   Secondary dressing: wound vac drape.   Bridge wounds and trac pad to lateral hip  NPWT continuous -125mmHg  Frequency: Tuesday / Thursday / Saturday dressing changes  Offloading: turn q 2 hrs, offload, specialty mattress, chair cushion     L hip:   Cleanse with wound cleanser  Pat dry, skin prep periwound,   Apply topical abx compound to wound bed, pack lightly with mesalt, cover with gauze, abd prn, medfix tape.  Change daily     R Heel:   Apply skin prep to healed R heel , cover with foam dressing, change every 3 days     Continue to offload:        Follow-up: 1 week in wound care center  [facility]: [facility]        Follow up in about 1 week (around 5/7/2024) for MD visit.

## 2024-04-30 NOTE — PATIENT INSTRUCTIONS
Cleanse wound with wound cleanser  Periwound care: prep periwound with skin prep, allow to dry , frame periwound with hydrocolloid, use hydrocolloid ring in creases ,  use mastisol as additional adhesive      Sacrum / R lateral buttock:  ( May apply crushed flagyl with  vac dressing change for odor control)  Primary dressing: santyl / hydrofera blue /black granufoam to sacrum,  Hydrofera blue rope to undermining area of R lateral buttock, black granufoam   Secondary dressing: wound vac drape.   Bridge wounds and trac pad to lateral hip  NPWT continuous -125mmHg  Frequency: Tuesday / Thursday / Saturday dressing changes  Offloading: turn q 2 hrs, offload, specialty mattress, chair cushion     L hip:   Cleanse with wound cleanser  Pat dry, skin prep periwound,   Apply topical abx compound to wound bed, pack lightly with mesalt, cover with gauze, abd prn, medfix tape.  Change daily     R Heel:   Apply skin prep to healed R heel , cover with foam dressing, change every 3 days     Continue to offload:        Follow-up: 1 week in wound care center  [facility]: [facility]

## 2024-05-06 NOTE — PATIENT INSTRUCTIONS
Cleanse wound with wound cleanser  Periwound care: prep periwound with skin prep, allow to dry , frame periwound with hydrocolloid, use hydrocolloid ring in creases , use mastisol as additional adhesive      Sacrum / R lateral buttock:  ( May apply crushed flagyl with  vac dressing change for odor control)  Primary dressing: santyl / hydrofera blue /black granufoam to sacrum,  Hydrofera blue rope to undermining area of R lateral buttock, black granufoam   Secondary dressing: wound vac drape.   Bridge wounds and trac pad to lateral hip  NPWT continuous -125mmHg  Frequency: Tuesday / Thursday / Saturday dressing changes  Offloading: turn q 2 hrs, offload, specialty mattress, chair cushion    Right hip Abrasion:  Cleanse with wound cleanser, pat dry, apply foam dressing Tues/Thurs/Sat (with wound vac dressing change)     L hip:   Cleanse with wound cleanser  Pat dry, skin prep periwound,   pack lightly with hydrofera blue, cover with gauze, abd prn, medfix tape.  Change daily     Continue to offload        Follow-up: 1 week in wound care center    [facility]: [facility]

## 2024-05-07 ENCOUNTER — HOSPITAL ENCOUNTER (OUTPATIENT)
Dept: WOUND CARE | Facility: HOSPITAL | Age: 59
Discharge: HOME | End: 2024-05-07
Attending: STUDENT IN AN ORGANIZED HEALTH CARE EDUCATION/TRAINING PROGRAM
Payer: MEDICARE

## 2024-05-07 VITALS
DIASTOLIC BLOOD PRESSURE: 71 MMHG | SYSTOLIC BLOOD PRESSURE: 107 MMHG | TEMPERATURE: 98 F | RESPIRATION RATE: 20 BRPM | HEART RATE: 62 BPM | OXYGEN SATURATION: 100 %

## 2024-05-07 DIAGNOSIS — S71.002S OPEN WOUND OF LEFT HIP, SEQUELA: ICD-10-CM

## 2024-05-07 DIAGNOSIS — L89.154 PRESSURE INJURY OF SACRAL REGION, STAGE 4: Primary | ICD-10-CM

## 2024-05-07 DIAGNOSIS — L89.314 PRESSURE INJURY OF RIGHT ISCHIUM, STAGE 4: ICD-10-CM

## 2024-05-07 PROCEDURE — 99213 OFFICE O/P EST LOW 20 MIN: CPT | Mod: 25

## 2024-05-07 PROCEDURE — 27000999 HC MEDICAL RECORD PHOTO DOCUMENTATION

## 2024-05-07 PROCEDURE — 99213 OFFICE O/P EST LOW 20 MIN: CPT | Mod: ,,, | Performed by: PEDIATRICS

## 2024-05-07 PROCEDURE — 97606 NEG PRS WND THER DME>50 SQCM: CPT

## 2024-05-07 NOTE — PROGRESS NOTES
Subjective:       Patient ID: Antonio Galvan II is a 58 y.o. male.    Chief Complaint: Pressure Ulcer (L hip, Sacrum, and R lateral buttock pressure ulcers. New area of concern to right hip. Here for re-evaluation and treatment with MD.)    HPI  Review of Systems      Objective:      Temp:  [98 °F (36.7 °C)]   Pulse:  [62]   Resp:  [20]   BP: (107)/(71)   SpO2:  [100 %]   Physical Exam       Negative Pressure Wound Therapy  02/29/24 1227 (Active)   02/29/24 1227   Side:    Orientation:    Location: Sacral Spine   Additional Comments: 2 sites NPWT - Sacrum / R lateral Buttock   Removal Indication and Assessment:    Location:    SDO Location:    NPWT Type Vacuum Therapy 05/07/24 1404   Therapy Setting NPWT Continuous therapy 05/07/24 1404   Pressure Setting NPWT 125 mmHg 05/07/24 1404   Therapy Interventions NPWT Canister changed;Dressing changed;Seal intact 05/07/24 1404   Sponges Inserted NPWT Black;4;Other 05/07/24 1404   Sponges Removed NPWT Black;4 05/07/24 1404            Altered Skin Integrity 01/12/23 1530 Sacral spine Full thickness tissue loss with exposed bone, tendon, or muscle. Often includes undermining and tunneling. May extend into muscle and/or supporting structures. (Active)   01/12/23 1530   Altered Skin Integrity Present on Admission - Did Patient arrive to the hospital with altered skin?: yes   Side:    Orientation:    Location: Sacral spine   Wound Number:    Is this injury device related?:    Primary Wound Type:    Description of Altered Skin Integrity: Full thickness tissue loss with exposed bone, tendon, or muscle. Often includes undermining and tunneling. May extend into muscle and/or supporting structures.   Ankle-Brachial Index:    Pulses:    Removal Indication and Assessment:    Wound Outcome:    (Retired) Wound Length (cm):    (Retired) Wound Width (cm):    (Retired) Depth (cm):    Wound Description (Comments):    Removal Indications:    Wound Image   05/07/24 1404   Dressing  Appearance Intact;Moist drainage 05/07/24 1404   Drainage Amount Large 05/07/24 1404   Drainage Characteristics/Odor Serosanguineous 05/07/24 1404   Appearance Red;Granulating;Tan;Slough;Fibrin 05/07/24 1404   Tissue loss description Full thickness 05/07/24 1404   Red (%), Wound Tissue Color 80 % 05/07/24 1404   Yellow (%), Wound Tissue Color 20 % 05/07/24 1404   Periwound Area Intact;Moist;[place] 05/07/24 1404   Wound Edges Defined 05/07/24 1404   Wound Length (cm) 7.5 cm 05/07/24 1404   Wound Width (cm) 6.5 cm 05/07/24 1404   Wound Depth (cm) 2.3 cm 05/07/24 1404   Wound Volume (cm^3) 112.125 cm^3 05/07/24 1404   Wound Surface Area (cm^2) 48.75 cm^2 05/07/24 1404   Care Cleansed with:;Antimicrobial agent;Wound cleanser 05/07/24 1404   Dressing Applied;Methylene blue/gentian violet;Other (comment) 05/07/24 1404   Periwound Care Skin barrier film applied;Hydrocolloid barrier applied;Other (see comments) 05/07/24 1404   Off Loading Other (see comments) 05/07/24 1404            Incision/Site 08/16/23 2011 Left Hip lateral (Active)   08/16/23 2011   Present Prior to Hospital Arrival?:    Side: Left   Location: Hip   Orientation: lateral   Incision Type:    Closure Method:    Additional Comments:    Removal Indication and Assessment:    Wound Outcome:    Removal Indications:    Wound Image   05/07/24 1404   Dressing Appearance Intact;Moist drainage 05/07/24 1404   Drainage Amount Small 05/07/24 1404   Drainage Characteristics/Odor Serous 05/07/24 1404   Appearance Red;Granulating;Tan;Fibrin 05/07/24 1404   Red (%), Wound Tissue Color 95 % 05/07/24 1404   Periwound Area Moist;[place];Scar tissue 05/07/24 1404   Wound Edges Defined;Rolled/closed 05/07/24 1404   Wound Length (cm) 2.5 cm 05/07/24 1404   Wound Width (cm) 2.5 cm 05/07/24 1404   Wound Depth (cm) 1.5 cm 05/07/24 1404   Wound Volume (cm^3) 9.375 cm^3 05/07/24 1404   Wound Surface Area (cm^2) 6.25 cm^2 05/07/24 1404   Undermining (depth (cm)/location) 2.3cm @ 3  o'clock 05/07/24 1404   Care Cleansed with:;Antimicrobial agent;Wound cleanser 05/07/24 1404   Dressing Applied;Methylene blue/gentian violet;Absorptive Pad 05/07/24 1404   Periwound Care Dry periwound area maintained;Skin barrier film applied 05/07/24 1404            Incision/Site 02/27/24 1450 Right Buttocks lateral (Active)   02/27/24 1450   Present Prior to Hospital Arrival?:    Side: Right   Location: Buttocks   Orientation: lateral   Incision Type:    Closure Method:    Additional Comments: mesalt, quick clot, gauze, abdomen pad, and tape   Removal Indication and Assessment:    Wound Outcome:    Removal Indications:    Wound Image   05/07/24 1404   Dressing Appearance Intact;Moist drainage 05/07/24 1404   Drainage Amount Small 05/07/24 1404   Drainage Characteristics/Odor Serosanguineous 05/07/24 1404   Appearance Red;Granulating;Tan;Fibrin;[place];Ecchymotic 05/07/24 1404   Red (%), Wound Tissue Color 100 % 05/07/24 1404   Periwound Area Moist;[place];Other (see comments) 05/07/24 1404   Wound Edges Defined 05/07/24 1404   Wound Length (cm) 6 cm 05/07/24 1404   Wound Width (cm) 12 cm 05/07/24 1404   Wound Depth (cm) 0.8 cm 05/07/24 1404   Wound Volume (cm^3) 57.6 cm^3 05/07/24 1404   Wound Surface Area (cm^2) 72 cm^2 05/07/24 1404   Undermining (depth (cm)/location) 3.5cm @ 1 o'clock 05/07/24 1404   Care Cleansed with:;Antimicrobial agent;Wound cleanser 05/07/24 1404   Dressing Applied;Methylene blue/gentian violet;Foam;Other (comment) 05/07/24 1404   Periwound Care Hydrocolloid barrier applied;Dry periwound area maintained;Skin barrier film applied;Other (see comments) 05/07/24 1404         Assessment:     Today sacral area is red and granulating with some slough and fibrous material.  7.5 cm x 6.5 cm with a depth of 2.3 cm.  This was cleaned Hydrofera Blue was applied.  Right buttocks wound was reddened granulating with small maroon area.  This is 6 cm x 12 cm with a depth of 0.8 cm.  This area was cleaned and  Hydrofera Blue was applied.  Wound VAC Was applied to both of these areas.  Right hip abrasion was cleaned and foam dressing was applied.  Left hip 2.5 cm by 2.5 cm with a depth of 1.5 cm.  There was some undermining of 2.3 cm at 3 o'clock.  Hydrofera Blue was applied to the area.  Dressings will be changed Tuesday Thursday in Saturday.  Patient will return in 1 week for MD visit.    ICD-10-CM ICD-9-CM   1. Pressure injury of sacral region, stage 4  L89.154 707.03     707.24   2. Pressure injury of right ischium, stage 4  L89.314 707.05     707.24   3. Open wound of left hip, sequela  S71.002S 906.1         Plan:   Tissue pathology and/or culture taken:  [] Yes [x] No   Sharp debridement performed:   [] Yes [x] No   Labs ordered this visit:   [] Yes [x] No   Imaging ordered this visit:   [] Yes [x] No           Orders Placed This Encounter   Procedures    Change dressing     Cleanse wound with wound cleanser  Periwound care: prep periwound with skin prep, allow to dry , frame periwound with hydrocolloid, use hydrocolloid ring in creases , use mastisol as additional adhesive      Sacrum / R lateral buttock:  ( May apply crushed flagyl with  vac dressing change for odor control)  Primary dressing: santyl / hydrofera blue /black granufoam to sacrum,  Hydrofera blue rope to undermining area of R lateral buttock, black granufoam   Secondary dressing: wound vac drape.   Bridge wounds and trac pad to lateral hip  NPWT continuous -125mmHg  Frequency: Tuesday / Thursday / Saturday dressing changes  Offloading: turn q 2 hrs, offload, specialty mattress, chair cushion    Right hip Abrasion:  Cleanse with wound cleanser, pat dry, apply foam dressing Tues/Thurs/Sat (with wound vac dressing change)     L hip:   Cleanse with wound cleanser  Pat dry, skin prep periwound,   pack lightly with hydrofera blue, cover with gauze, abd prn, medfix tape.  Change daily     Continue to offload        Follow-up: 1 week in wound care  [facility]        Follow up in about 1 week (around 5/14/2024) for MD visit.

## 2024-05-14 ENCOUNTER — HOSPITAL ENCOUNTER (OUTPATIENT)
Dept: WOUND CARE | Facility: HOSPITAL | Age: 59
Discharge: HOME | End: 2024-05-14
Attending: STUDENT IN AN ORGANIZED HEALTH CARE EDUCATION/TRAINING PROGRAM
Payer: MEDICARE

## 2024-05-14 VITALS
SYSTOLIC BLOOD PRESSURE: 96 MMHG | TEMPERATURE: 99 F | OXYGEN SATURATION: 96 % | DIASTOLIC BLOOD PRESSURE: 66 MMHG | RESPIRATION RATE: 20 BRPM

## 2024-05-14 DIAGNOSIS — L89.154 PRESSURE INJURY OF SACRAL REGION, STAGE 4: Primary | ICD-10-CM

## 2024-05-14 DIAGNOSIS — L89.314 PRESSURE INJURY OF RIGHT ISCHIUM, STAGE 4: ICD-10-CM

## 2024-05-14 DIAGNOSIS — S71.002S OPEN WOUND OF LEFT HIP, SEQUELA: ICD-10-CM

## 2024-05-14 PROCEDURE — 99213 OFFICE O/P EST LOW 20 MIN: CPT | Mod: 25

## 2024-05-14 PROCEDURE — 99213 OFFICE O/P EST LOW 20 MIN: CPT | Mod: ,,, | Performed by: PEDIATRICS

## 2024-05-14 PROCEDURE — 27000999 HC MEDICAL RECORD PHOTO DOCUMENTATION

## 2024-05-14 PROCEDURE — 97606 NEG PRS WND THER DME>50 SQCM: CPT

## 2024-05-14 RX ORDER — MUPIROCIN 20 MG/G
OINTMENT TOPICAL
COMMUNITY
Start: 2024-04-27 | End: 2024-06-11

## 2024-05-14 RX ORDER — KETOCONAZOLE 20 MG/G
CREAM TOPICAL
COMMUNITY
Start: 2024-05-13
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
## 2024-05-14 NOTE — PATIENT INSTRUCTIONS
Cleanse wound with wound cleanser  Periwound care: prep periwound with skin prep, allow to dry , frame periwound with hydrocolloid, use hydrocolloid ring in creases , use mastisol as additional adhesive      Sacrum / R lateral buttock:  ( May apply crushed flagyl with  vac dressing change for odor control)  Primary dressing: santyl / hydrofera blue /black granufoam to sacrum,  Hydrofera blue rope to undermining area of R lateral buttock, Aquacel Ag to distal aspect of right lateral buttock; black granufoam   Secondary dressing: wound vac drape.   Bridge wounds and trac pad to lateral hip  NPWT continuous -125mmHg  Frequency: Tuesday / Thursday / Saturday dressing changes  Offloading: turn q 2 hrs, offload, specialty mattress, chair cushion     Right hip Abrasion:  your wound is healed, it is extremely fragile at this stage; protect it from friction, wash it gently and pat dry. Continue covering with a cover dressing for an additional week or two.      L hip:   Cleanse with wound cleanser  Pat dry, skin prep periwound,   pack lightly with hydrofera blue, cover with gauze, abd prn, medfix tape.  Change daily     Continue to offload        Follow-up: 1 week in wound care center     [facility]: [facility]

## 2024-05-21 ENCOUNTER — HOSPITAL ENCOUNTER (OUTPATIENT)
Dept: WOUND CARE | Facility: HOSPITAL | Age: 59
Discharge: HOME | End: 2024-05-21
Attending: STUDENT IN AN ORGANIZED HEALTH CARE EDUCATION/TRAINING PROGRAM
Payer: MEDICARE

## 2024-05-21 DIAGNOSIS — L89.314 PRESSURE INJURY OF RIGHT ISCHIUM, STAGE 4: ICD-10-CM

## 2024-05-21 DIAGNOSIS — S71.002S OPEN WOUND OF LEFT HIP, SEQUELA: ICD-10-CM

## 2024-05-21 DIAGNOSIS — L89.154 PRESSURE INJURY OF SACRAL REGION, STAGE 4: Primary | ICD-10-CM

## 2024-05-21 PROCEDURE — 99213 OFFICE O/P EST LOW 20 MIN: CPT | Mod: 25,ICN,, | Performed by: PEDIATRICS

## 2024-05-21 PROCEDURE — 11043 DBRDMT MUSC&/FSCA 1ST 20/<: CPT | Mod: ,,, | Performed by: PEDIATRICS

## 2024-05-21 PROCEDURE — 97597 DBRDMT OPN WND 1ST 20 CM/<: CPT

## 2024-05-21 PROCEDURE — 97598 DBRDMT OPN WND ADDL 20CM/<: CPT

## 2024-05-21 PROCEDURE — 27000999 HC MEDICAL RECORD PHOTO DOCUMENTATION

## 2024-05-21 PROCEDURE — 97606 NEG PRS WND THER DME>50 SQCM: CPT

## 2024-05-21 PROCEDURE — 99214 OFFICE O/P EST MOD 30 MIN: CPT

## 2024-05-21 PROCEDURE — 11046 DBRDMT MUSC&/FSCA EA ADDL: CPT | Mod: ,,, | Performed by: PEDIATRICS

## 2024-05-21 NOTE — PATIENT INSTRUCTIONS
Cleanse wound with wound cleanser  Periwound care: prep periwound with skin prep, allow to dry , frame periwound with hydrocolloid, use hydrocolloid ring in creases , use mastisol as additional adhesive      Sacrum / R lateral buttock:  ( May apply crushed flagyl with  vac dressing change for odor control)  Primary dressing: santyl / black granufoam to sacrum,  Hydrofera blue rope to undermining area of R lateral buttock, Aquacel Ag to distal aspect of right lateral buttock as needed for bleeding; black granufoam   Secondary dressing: wound vac drape.   Bridge wounds and trac pad to lateral hip  NPWT continuous -125mmHg  Frequency: Tuesday / Thursday / Saturday dressing changes  Offloading: turn q 2 hrs, offload, specialty mattress, chair cushion     L hip:   Cleanse with wound cleanser  Pat dry, skin prep periwound,   pack lightly with hydrofera blue, cover with gauze, abd prn, medfix tape.  Change daily     Continue to offload        Follow-up: 1 week in wound care center     [facility]: [facility]

## 2024-05-21 NOTE — PROCEDURES
"Debridement    Date/Time: 5/21/2024 1:25 PM    Performed by: Ronald Barcenas MD  Authorized by: Ronald Barcenas MD  Associated wounds:        Altered Skin Integrity 01/12/23 1530 Sacral spine Full thickness tissue loss with exposed bone, tendon, or muscle. Often includes undermining and tunneling. May extend into muscle and/or supporting structures.  Time out: Immediately prior to procedure a "time out" was called to verify the correct patient, procedure, equipment, support staff and site/side marked as required.    Consent Done?:  Yes (Verbal) and Yes (Written)    Preparation: Patient was prepped and draped with clean technique    Local anesthesia used?: No      Wound Details:    Location:  Sacrum    Type of Debridement:  Non-excisional       Length (cm):  8       Area (sq cm):  48       Width (cm):  6       Percent Debrided (%):  100       Depth (cm):  2.4       Total Area Debrided (sq cm):  48    Depth of debridement:  Muscle/fascia/tendon    Tissue debrided:  Subcutaneous, Fascia, Muscle and Adipose    Devitalized tissue debrided:  Slough, Fibrin and Biofilm    Instruments:  Curette, Scissors and Forceps  Bleeding:  Moderate  Hemostasis Achieved: Yes  Method Used:  Pressure  Patient tolerance:  Patient tolerated the procedure well with no immediate complications  "

## 2024-05-21 NOTE — PROGRESS NOTES
Subjective:       Patient ID: Antonio Galvan II is a 58 y.o. male.    Chief Complaint: Pressure Ulcer (Left hip, sacral, right lateral buttock stg 4 pressure ulcers.   Follow up with md for re-evaluation and treatment with md. )    HPI  Review of Systems      Objective:      Temp:  [97.5 °F (36.4 °C)]   Pulse:  [109]   Resp:  [16]   Physical Exam       Negative Pressure Wound Therapy  02/29/24 1227 (Active)   02/29/24 1227   Side:    Orientation:    Location: Sacral Spine   Additional Comments: 2 sites NPWT - Sacrum / R lateral Buttock   Removal Indication and Assessment:    Location:    SDO Location:    NPWT Type Vacuum Therapy 05/21/24 1339   Therapy Setting NPWT Continuous therapy 05/21/24 1339   Pressure Setting NPWT 125 mmHg 05/21/24 1339   Therapy Interventions NPWT Dressing changed;Seal intact 05/21/24 1339   Sponges Inserted NPWT Black;4;Other 05/21/24 1339   Sponges Removed NPWT Black;4;Other 05/21/24 1339            Altered Skin Integrity 01/12/23 1530 Sacral spine Full thickness tissue loss with exposed bone, tendon, or muscle. Often includes undermining and tunneling. May extend into muscle and/or supporting structures. (Active)   01/12/23 1530   Altered Skin Integrity Present on Admission - Did Patient arrive to the hospital with altered skin?: yes   Side:    Orientation:    Location: Sacral spine   Wound Number:    Is this injury device related?:    Primary Wound Type:    Description of Altered Skin Integrity: Full thickness tissue loss with exposed bone, tendon, or muscle. Often includes undermining and tunneling. May extend into muscle and/or supporting structures.   Ankle-Brachial Index:    Pulses:    Removal Indication and Assessment:    Wound Outcome:    (Retired) Wound Length (cm):    (Retired) Wound Width (cm):    (Retired) Depth (cm):    Wound Description (Comments):    Removal Indications:    Wound Image     05/21/24 1339   Dressing Appearance Intact;Moist drainage 05/21/24 1339    Drainage Amount Large 05/21/24 1339   Drainage Characteristics/Odor Serosanguineous 05/21/24 1339   Appearance Red;Granulating;Tan;Slough;Fibrin;Yellow 05/21/24 1339   Tissue loss description Full thickness 05/21/24 1339   Red (%), Wound Tissue Color 65 % 05/21/24 1339   Yellow (%), Wound Tissue Color 35 % 05/21/24 1339   Periwound Area Intact;Moist;Pink;Excoriated;Scar tissue 05/21/24 1339   Wound Edges Defined 05/21/24 1339   Wound Length (cm) 8 cm 05/21/24 1339   Wound Width (cm) 6 cm 05/21/24 1339   Wound Depth (cm) 2.4 cm 05/21/24 1339   Wound Volume (cm^3) 115.2 cm^3 05/21/24 1339   Wound Surface Area (cm^2) 48 cm^2 05/21/24 1339   Care Cleansed with:;Antimicrobial agent;Wound cleanser 05/21/24 1339   Dressing Applied;Other (comment) 05/21/24 1339   Periwound Care Skin barrier film applied;Hydrocolloid barrier applied;Other (see comments) 05/21/24 1339   Off Loading Other (see comments) 05/21/24 1339            Incision/Site 08/16/23 2011 Left Hip lateral (Active)   08/16/23 2011   Present Prior to Hospital Arrival?:    Side: Left   Location: Hip   Orientation: lateral   Incision Type:    Closure Method:    Additional Comments:    Removal Indication and Assessment:    Wound Outcome:    Removal Indications:    Wound Image   05/21/24 1339   Dressing Appearance Intact;Moist drainage 05/21/24 1339   Drainage Amount Small 05/21/24 1339   Drainage Characteristics/Odor Serous 05/21/24 1339   Appearance Red;Granulating;Tan;Fibrin;Ecchymotic 05/21/24 1339   Red (%), Wound Tissue Color 95 % 05/21/24 1339   Periwound Area Moist;[place];Scar tissue 05/21/24 1339   Wound Edges Defined;Rolled/closed 05/21/24 1339   Wound Length (cm) 3.6 cm 05/21/24 1339   Wound Width (cm) 2.6 cm 05/21/24 1339   Wound Depth (cm) 1.8 cm 05/21/24 1339   Wound Volume (cm^3) 16.848 cm^3 05/21/24 1339   Wound Surface Area (cm^2) 9.36 cm^2 05/21/24 1339   Undermining (depth (cm)/location) 1.8cm @ 3 o'clock 05/21/24 1339   Care Cleansed  with:;Antimicrobial agent;Wound cleanser 05/21/24 1339   Dressing Applied;Methylene blue/gentian violet;Absorptive Pad;Other (comment);Gauze 05/21/24 1339   Periwound Care Skin barrier film applied;Dry periwound area maintained 05/21/24 1339            Incision/Site 02/27/24 1450 Right Buttocks lateral (Active)   02/27/24 1450   Present Prior to Hospital Arrival?:    Side: Right   Location: Buttocks   Orientation: lateral   Incision Type:    Closure Method:    Additional Comments: mesalt, quick clot, gauze, abdomen pad, and tape   Removal Indication and Assessment:    Wound Outcome:    Removal Indications:    Wound Image   05/21/24 1339   Dressing Appearance Intact;Moist drainage 05/21/24 1339   Drainage Amount Small 05/21/24 1339   Drainage Characteristics/Odor Serosanguineous 05/21/24 1339   Appearance Red;Granulating;Maroon 05/21/24 1339   Red (%), Wound Tissue Color 100 % 05/21/24 1339   Periwound Area Moist;Pink;Excoriated;Denuded;Scar tissue 05/21/24 1339   Wound Edges Defined 05/21/24 1339   Wound Length (cm) 7 cm 05/21/24 1339   Wound Width (cm) 12 cm 05/21/24 1339   Wound Depth (cm) 0.7 cm 05/21/24 1339   Wound Volume (cm^3) 58.8 cm^3 05/21/24 1339   Wound Surface Area (cm^2) 84 cm^2 05/21/24 1339   Undermining (depth (cm)/location) 12 to 2 o'clock / 5cm at 2 o'clock 05/21/24 1339   Care Cleansed with:;Antimicrobial agent;Wound cleanser 05/21/24 1339   Dressing Applied;Methylene blue/gentian violet;Foam;Other (comment) 05/21/24 1339   Periwound Care Skin barrier film applied;Hydrocolloid barrier applied;Dry periwound area maintained;Other (see comments) 05/21/24 1339         Assessment:     Today left hip wound is 3.6 cm x 2.6 cm with a depth of 1.8 cm.  This is red and granulating with some fibrin.  This was cleaned and Hydrofera Blue was applied to the wound with a absorptive pads and dressings.  Right buttocks wound is red and granulating with some bruising.  There is undermining of 5 cm at 12 10 2  o'clock.  Was some bleeding underneath but this was stopped with pressure Hydrofera Blue was applied to this area then VAC was applied.  Sacral was 8 cm x 6 cm with granulation tissue and slough and fibrin.  Because of this a selective debridement was done see procedure note.  Santyl was applied to wound.  Also VAC was applied.  Hip wound will be changed daily and sacrum and right buttocks changed on Tuesday Thursday and Saturday.  Patient return 1 week for MD visit.    ICD-10-CM ICD-9-CM   1. Pressure injury of sacral region, stage 4  L89.154 707.03     707.24   2. Pressure injury of right ischium, stage 4  L89.314 707.05     707.24   3. Open wound of left hip, sequela  S71.002S 906.1         Plan:   Tissue pathology and/or culture taken:  [] Yes [x] No   Sharp debridement performed:   [x] Yes [] No   Labs ordered this visit:   [] Yes [x] No   Imaging ordered this visit:   [] Yes [x] No           Orders Placed This Encounter   Procedures    Change dressing     Cleanse wound with wound cleanser  Periwound care: prep periwound with skin prep, allow to dry , frame periwound with hydrocolloid, use hydrocolloid ring in creases , use mastisol as additional adhesive      Sacrum / R lateral buttock:  ( May apply crushed flagyl with  vac dressing change for odor control)  Primary dressing: santyl / black granufoam to sacrum,  Hydrofera blue rope to undermining area of R lateral buttock, Aquacel Ag to distal aspect of right lateral buttock as needed for bleeding; black granufoam   Secondary dressing: wound vac drape.   Bridge wounds and trac pad to lateral hip  NPWT continuous -125mmHg  Frequency: Tuesday / Thursday / Saturday dressing changes  Offloading: turn q 2 hrs, offload, specialty mattress, chair cushion     L hip:   Cleanse with wound cleanser  Pat dry, skin prep periwound,   pack lightly with hydrofera blue, cover with gauze, abd prn, medfix tape.  Change daily     Continue to offload        Follow-up: 1 week in  wound care center     [facility]        Follow up in about 1 week (around 5/28/2024) for md visit .

## 2024-05-23 NOTE — PATIENT INSTRUCTIONS
Cleanse wound with wound cleanser  Periwound care: prep periwound with skin prep, allow to dry , frame periwound with hydrocolloid, use hydrocolloid ring in creases , use mastisol as additional adhesive      Sacrum / R lateral buttock:  ( May apply crushed flagyl with  vac dressing change for odor control)  Primary dressing: santyl / black granufoam to sacrum,  Hydrofera blue rope to undermining area of R lateral buttock, Aquacel Ag to distal aspect of right lateral buttock as needed for bleeding; black granufoam   Secondary dressing: wound vac drape.   Bridge wounds and trac pad to lateral hip  NPWT continuous -125mmHg  Frequency: Tuesday / Thursday / Saturday dressing changes  Offloading: turn q 2 hrs, offload, specialty mattress, chair cushion     L hip:   Cleanse with wound cleanser  Pat dry, skin prep periwound,   pack lightly with hydrofera blue, cover with gauze, abd prn, medfix tape.  Change daily     Continue to offload        Follow-up: 1 week in wound care center     [facility]    Additional orders:  Home health to administer Ceftriaxone 1gm IM injection daily x 10 days.   May mix with lidocaine 1% inj 3ml as diluent prn.

## 2024-05-28 ENCOUNTER — HOSPITAL ENCOUNTER (OUTPATIENT)
Dept: WOUND CARE | Facility: HOSPITAL | Age: 59
Discharge: HOME | End: 2024-05-28
Attending: STUDENT IN AN ORGANIZED HEALTH CARE EDUCATION/TRAINING PROGRAM
Payer: MEDICARE

## 2024-05-28 VITALS
HEART RATE: 85 BPM | SYSTOLIC BLOOD PRESSURE: 112 MMHG | TEMPERATURE: 99 F | RESPIRATION RATE: 20 BRPM | OXYGEN SATURATION: 95 % | DIASTOLIC BLOOD PRESSURE: 77 MMHG

## 2024-05-28 DIAGNOSIS — L89.314 PRESSURE INJURY OF RIGHT ISCHIUM, STAGE 4: ICD-10-CM

## 2024-05-28 DIAGNOSIS — L89.154 PRESSURE INJURY OF SACRAL REGION, STAGE 4: Primary | ICD-10-CM

## 2024-05-28 DIAGNOSIS — S71.002S OPEN WOUND OF LEFT HIP, SEQUELA: ICD-10-CM

## 2024-05-28 PROCEDURE — 99213 OFFICE O/P EST LOW 20 MIN: CPT | Mod: ,,, | Performed by: PEDIATRICS

## 2024-05-28 PROCEDURE — 97606 NEG PRS WND THER DME>50 SQCM: CPT

## 2024-05-28 PROCEDURE — 27000999 HC MEDICAL RECORD PHOTO DOCUMENTATION

## 2024-05-28 PROCEDURE — 99213 OFFICE O/P EST LOW 20 MIN: CPT | Mod: 25

## 2024-05-28 RX ORDER — CEFTRIAXONE 1 G/1
1 INJECTION, POWDER, FOR SOLUTION INTRAMUSCULAR; INTRAVENOUS DAILY
Qty: 10 G | Refills: 0 | Status: SHIPPED | OUTPATIENT
Start: 2024-05-28 | End: 2024-06-07

## 2024-05-28 RX ORDER — LIDOCAINE HYDROCHLORIDE AND EPINEPHRINE 10; 10 MG/ML; UG/ML
1 INJECTION, SOLUTION INFILTRATION; PERINEURAL DAILY
Qty: 10 ML | Refills: 0 | Status: SHIPPED | OUTPATIENT
Start: 2024-05-28 | End: 2024-06-07

## 2024-05-28 NOTE — PROGRESS NOTES
Subjective:       Patient ID: Antonio Galvan II is a 58 y.o. male.    Chief Complaint: Pressure Ulcer (Left hip, sacrum, and right lateral buttock pressure ulcers. Follow up with md for re-evaluation and treatment with md. / /)    HPI  Review of Systems      Objective:      Temp:  [98.5 °F (36.9 °C)]   Pulse:  [85]   Resp:  [20]   BP: (112)/(77)   SpO2:  [95 %]   Physical Exam       Negative Pressure Wound Therapy  02/29/24 1227 (Active)   02/29/24 1227   Side:    Orientation:    Location: Sacral Spine   Additional Comments: 2 sites NPWT - Sacrum / R lateral Buttock   Removal Indication and Assessment:    Location:    SDO Location:    NPWT Type Vacuum Therapy 05/28/24 1528   Therapy Setting NPWT Continuous therapy 05/28/24 1528   Pressure Setting NPWT 125 mmHg 05/28/24 1528   Therapy Interventions NPWT Dressing changed;Seal intact 05/28/24 1528   Sponges Inserted NPWT Black;4 05/28/24 1528   Sponges Removed NPWT Black;4 05/28/24 1528            Altered Skin Integrity 01/12/23 1530 Sacral spine Full thickness tissue loss with exposed bone, tendon, or muscle. Often includes undermining and tunneling. May extend into muscle and/or supporting structures. (Active)   01/12/23 1530   Altered Skin Integrity Present on Admission - Did Patient arrive to the hospital with altered skin?: yes   Side:    Orientation:    Location: Sacral spine   Wound Number:    Is this injury device related?:    Primary Wound Type:    Description of Altered Skin Integrity: Full thickness tissue loss with exposed bone, tendon, or muscle. Often includes undermining and tunneling. May extend into muscle and/or supporting structures.   Ankle-Brachial Index:    Pulses:    Removal Indication and Assessment:    Wound Outcome:    (Retired) Wound Length (cm):    (Retired) Wound Width (cm):    (Retired) Depth (cm):    Wound Description (Comments):    Removal Indications:    Wound Image    05/28/24 1528   Dressing Appearance Intact;Moist drainage  05/28/24 1528   Drainage Amount Moderate 05/28/24 1528   Drainage Characteristics/Odor Serosanguineous 05/28/24 1528   Appearance Red;Granulating;Yellow;Tan;Slough;Maroon;Ecchymotic 05/28/24 1528   Tissue loss description Full thickness 05/28/24 1528   Red (%), Wound Tissue Color 60 % 05/28/24 1528   Yellow (%), Wound Tissue Color 40 % 05/28/24 1528   Periwound Area Intact;Moist;Pink;Excoriated;Scar tissue 05/28/24 1528   Wound Edges Defined 05/28/24 1528   Wound Length (cm) 7.6 cm 05/28/24 1528   Wound Width (cm) 6 cm 05/28/24 1528   Wound Depth (cm) 2 cm 05/28/24 1528   Wound Volume (cm^3) 91.2 cm^3 05/28/24 1528   Wound Surface Area (cm^2) 45.6 cm^2 05/28/24 1528   Care Cleansed with:;Antimicrobial agent;Wound cleanser 05/28/24 1528   Dressing Applied;Other (comment) 05/28/24 1528   Periwound Care Skin barrier film applied;Hydrocolloid barrier applied 05/28/24 1528   Off Loading Other (see comments) 05/28/24 1528            Incision/Site 08/16/23 2011 Left Hip lateral (Active)   08/16/23 2011   Present Prior to Hospital Arrival?:    Side: Left   Location: Hip   Orientation: lateral   Incision Type:    Closure Method:    Additional Comments:    Removal Indication and Assessment:    Wound Outcome:    Removal Indications:    Wound Image   05/28/24 1528   Dressing Appearance Intact;Moist drainage 05/28/24 1528   Drainage Amount Large 05/28/24 1528   Drainage Characteristics/Odor Yellow;Green;Creamy 05/28/24 1528   Appearance Red;Granulating;Tan;Fibrin 05/28/24 1528   Red (%), Wound Tissue Color 80 % 05/28/24 1528   Periwound Area Moist;[place];Scar tissue 05/28/24 1528   Wound Edges Defined;Rolled/closed 05/28/24 1528   Wound Length (cm) 3.3 cm 05/28/24 1528   Wound Width (cm) 2.5 cm 05/28/24 1528   Wound Depth (cm) 2.2 cm 05/28/24 1528   Wound Volume (cm^3) 18.15 cm^3 05/28/24 1528   Wound Surface Area (cm^2) 8.25 cm^2 05/28/24 1528   Undermining (depth (cm)/location) 360 degree; 2.6cm @ 3 o'clock 05/28/24 1528   Care  Cleansed with:;Antimicrobial agent;Wound cleanser 05/28/24 1528   Dressing Applied;Methylene blue/gentian violet;Absorptive Pad;Gauze 05/28/24 1528   Periwound Care Skin barrier film applied;Dry periwound area maintained 05/28/24 1528            Incision/Site 02/27/24 1450 Right Buttocks lateral (Active)   02/27/24 1450   Present Prior to Hospital Arrival?:    Side: Right   Location: Buttocks   Orientation: lateral   Incision Type:    Closure Method:    Additional Comments: mesalt, quick clot, gauze, abdomen pad, and tape   Removal Indication and Assessment:    Wound Outcome:    Removal Indications:    Wound Image    05/28/24 1528   Dressing Appearance Intact;Moist drainage 05/28/24 1528   Drainage Amount Small 05/28/24 1528   Drainage Characteristics/Odor Serosanguineous;No odor 05/28/24 1528   Appearance Red;Granulating;Maroon 05/28/24 1528   Red (%), Wound Tissue Color 100 % 05/28/24 1528   Periwound Area Moist;Pink;Excoriated;Scar tissue 05/28/24 1528   Wound Edges Defined 05/28/24 1528   Wound Length (cm) 6.5 cm 05/28/24 1528   Wound Width (cm) 11.5 cm 05/28/24 1528   Wound Depth (cm) 0.7 cm 05/28/24 1528   Wound Volume (cm^3) 52.325 cm^3 05/28/24 1528   Wound Surface Area (cm^2) 74.75 cm^2 05/28/24 1528   Undermining (depth (cm)/location) 12-3 o'clock; 6cm @ 3 o'clock 05/28/24 1528   Care Cleansed with:;Antimicrobial agent;Wound cleanser 05/28/24 1528   Dressing Applied;Methylene blue/gentian violet;Foam;Other (comment) 05/28/24 1528   Periwound Care Skin barrier film applied;Hydrocolloid barrier applied;Dry periwound area maintained 05/28/24 1528         Assessment:     Today sacral spine is 7.6 cm x 6 cm with a depth of 2 cm.  There is some granulation tissue and red there is occasional slough some ecchymotic areas.  This has improved since last week.  There are also has some evidence of minor bleeding.  Because of this no debridement was done today.  Right hip area is 6.5 cm x 11.5 cm with a depth of 0.7 cm  there is some reddened granulating areas with some bruising.  Also there is undermining with possible infection.  This was cleaned and both wounds had Mesalt applied to the regular area and Hydrofera Blue applied to the undermining.  Patient did not have his wound VAC foam so this was not applied today.  Left hip area was 3.3 cm x 2.5 cm with a depth of 2.2 cm there is 2.5 cm of undermining at 360° area was cleaned and Hydrofera Blue was applied.  Because of the possibility of greenish drainage and possibility of Klebsiella as in past infections patient will be started on Rocephin was lidocaine 1 g IM daily for 10 days.  Patient will return in 1 week for MD visit.    ICD-10-CM ICD-9-CM   1. Pressure injury of sacral region, stage 4  L89.154 707.03     707.24   2. Pressure injury of right ischium, stage 4  L89.314 707.05     707.24   3. Open wound of left hip, sequela  S71.002S 906.1         Plan:   Tissue pathology and/or culture taken:  [] Yes [x] No   Sharp debridement performed:   [] Yes [x] No   Labs ordered this visit:   [] Yes [x] No   Imaging ordered this visit:   [] Yes [x] No           Orders Placed This Encounter   Procedures    Change dressing     Cleanse wound with wound cleanser  Periwound care: prep periwound with skin prep, allow to dry , frame periwound with hydrocolloid, use hydrocolloid ring in creases , use mastisol as additional adhesive      Sacrum / R lateral buttock:  ( May apply crushed flagyl with  vac dressing change for odor control)  Primary dressing: santyl / black granufoam to sacrum,  Hydrofera blue rope to undermining area of R lateral buttock, Aquacel Ag to distal aspect of right lateral buttock as needed for bleeding; black granufoam   Secondary dressing: wound vac drape.   Bridge wounds and trac pad to lateral hip  NPWT continuous -125mmHg  Frequency: Tuesday / Thursday / Saturday dressing changes  Offloading: turn q 2 hrs, offload, specialty mattress, chair cushion     L hip:    Cleanse with wound cleanser  Pat dry, skin prep periwound,   pack lightly with hydrofera blue, cover with gauze, abd prn, medfix tape.  Change daily     Continue to offload        Follow-up: 1 week in wound care center     Home [facility]: [facility]     Additional orders:  Home health to administer Ceftriaxone 1gm IM injection daily x 10 days.   May mix with lidocaine 1% inj 3ml as diluent prn.        Follow up in about 1 week (around 6/4/2024) for MD visit.

## 2024-05-30 NOTE — PATIENT INSTRUCTIONS
Cleanse wound with wound cleanser  Periwound care: prep periwound with skin prep, allow to dry , frame periwound with hydrocolloid, use hydrocolloid ring in creases , use mastisol as additional adhesive      Sacrum / R lateral buttock:  ( May apply crushed flagyl with  vac dressing change for odor control)  Primary dressing: santyl / black granufoam to sacrum,  Hydrofera blue rope to undermining area of R lateral buttock, Aquacel Ag to distal aspect of right lateral buttock as needed for bleeding; black granufoam   Secondary dressing: wound vac drape.   Bridge wounds and trac pad to lateral hip  NPWT continuous -125mmHg  Frequency: Tuesday / Thursday / Saturday dressing changes  Offloading: turn q 2 hrs, offload, specialty mattress, chair cushion     L hip:   Cleanse with wound cleanser  Pat dry, skin prep periwound,   pack lightly with hydrofera blue, cover with gauze, abd prn, medfix tape.  Change daily     Continue to offload        Follow-up: 1 week in wound care center     [facility]: [facility]     Additional orders:  Home health to continue administering Ceftriaxone 1gm IM injection daily until 10 doses have been administered.   May mix with lidocaine 1% inj 3ml as diluent prn.

## 2024-06-04 ENCOUNTER — HOSPITAL ENCOUNTER (OUTPATIENT)
Dept: WOUND CARE | Facility: HOSPITAL | Age: 59
Discharge: HOME | End: 2024-06-04
Attending: STUDENT IN AN ORGANIZED HEALTH CARE EDUCATION/TRAINING PROGRAM
Payer: MEDICARE

## 2024-06-04 VITALS
OXYGEN SATURATION: 98 % | HEART RATE: 73 BPM | DIASTOLIC BLOOD PRESSURE: 69 MMHG | RESPIRATION RATE: 20 BRPM | TEMPERATURE: 98 F | SYSTOLIC BLOOD PRESSURE: 102 MMHG

## 2024-06-04 DIAGNOSIS — L89.154 PRESSURE INJURY OF SACRAL REGION, STAGE 4: Primary | ICD-10-CM

## 2024-06-04 DIAGNOSIS — S71.002S OPEN WOUND OF LEFT HIP, SEQUELA: ICD-10-CM

## 2024-06-04 DIAGNOSIS — L89.314 PRESSURE INJURY OF RIGHT ISCHIUM, STAGE 4: ICD-10-CM

## 2024-06-04 PROCEDURE — 99213 OFFICE O/P EST LOW 20 MIN: CPT | Mod: 25

## 2024-06-04 PROCEDURE — 97606 NEG PRS WND THER DME>50 SQCM: CPT

## 2024-06-04 PROCEDURE — 27000999 HC MEDICAL RECORD PHOTO DOCUMENTATION

## 2024-06-04 PROCEDURE — 99213 OFFICE O/P EST LOW 20 MIN: CPT | Mod: ,,, | Performed by: PEDIATRICS

## 2024-06-04 NOTE — PROGRESS NOTES
Subjective:       Patient ID: Antonio Galvan II is a 58 y.o. male.    Chief Complaint: Pressure Ulcer (Left hip, sacrum, and right lateral buttock pressure ulcers. Pt currently receiving Ceftriaxone 1gm IM injection daily as prescribed, per home health.  Follow up with md for re-evaluation and treatment with md. / /)    HPI  Review of Systems      Objective:      Temp:  [98.2 °F (36.8 °C)]   Pulse:  [73]   Resp:  [20]   BP: (102)/(69)   SpO2:  [98 %]   Physical Exam       Negative Pressure Wound Therapy  02/29/24 1227 (Active)   02/29/24 1227   Side:    Orientation:    Location: Sacral Spine   Additional Comments: 2 sites NPWT - Sacrum / R lateral Buttock   Removal Indication and Assessment:    Location:    SDO Location:    NPWT Type Vacuum Therapy 06/04/24 1428   Therapy Setting NPWT Continuous therapy 06/04/24 1428   Pressure Setting NPWT 125 mmHg 06/04/24 1428   Therapy Interventions NPWT Dressing changed;Seal intact 06/04/24 1428   Sponges Inserted NPWT Black;4 06/04/24 1428   Sponges Removed NPWT Black;4 06/04/24 1428            Altered Skin Integrity 01/12/23 1530 Sacral spine Full thickness tissue loss with exposed bone, tendon, or muscle. Often includes undermining and tunneling. May extend into muscle and/or supporting structures. (Active)   01/12/23 1530   Altered Skin Integrity Present on Admission - Did Patient arrive to the hospital with altered skin?: yes   Side:    Orientation:    Location: Sacral spine   Wound Number:    Is this injury device related?:    Primary Wound Type:    Description of Altered Skin Integrity: Full thickness tissue loss with exposed bone, tendon, or muscle. Often includes undermining and tunneling. May extend into muscle and/or supporting structures.   Ankle-Brachial Index:    Pulses:    Removal Indication and Assessment:    Wound Outcome:    (Retired) Wound Length (cm):    (Retired) Wound Width (cm):    (Retired) Depth (cm):    Wound Description (Comments):    Removal  Indications:    Wound Image    06/04/24 1428   Dressing Appearance Intact;Moist drainage 06/04/24 1428   Drainage Amount Moderate 06/04/24 1428   Drainage Characteristics/Odor Serosanguineous 06/04/24 1428   Appearance Red;Granulating;Yellow;Tan;Slough;Maroon;Ecchymotic 06/04/24 1428   Tissue loss description Full thickness 06/04/24 1428   Red (%), Wound Tissue Color 60 % 06/04/24 1428   Yellow (%), Wound Tissue Color 40 % 06/04/24 1428   Periwound Area Intact;Moist;Pink;Excoriated;Scar tissue 06/04/24 1428   Wound Edges Defined 06/04/24 1428   Wound Length (cm) 7.5 cm 06/04/24 1428   Wound Width (cm) 6 cm 06/04/24 1428   Wound Depth (cm) 2.2 cm 06/04/24 1428   Wound Volume (cm^3) 99 cm^3 06/04/24 1428   Wound Surface Area (cm^2) 45 cm^2 06/04/24 1428   Care Cleansed with:;Antimicrobial agent;Wound cleanser 06/04/24 1428   Dressing Applied;Other (comment) 06/04/24 1428   Periwound Care Skin barrier film applied;Hydrocolloid barrier applied 06/04/24 1428   Off Loading Other (see comments) 06/04/24 1428            Incision/Site 08/16/23 2011 Left Hip lateral (Active)   08/16/23 2011   Present Prior to Hospital Arrival?:    Side: Left   Location: Hip   Orientation: lateral   Incision Type:    Closure Method:    Additional Comments:    Removal Indication and Assessment:    Wound Outcome:    Removal Indications:    Wound Image   06/04/24 1428   Dressing Appearance Intact;Moist drainage 06/04/24 1428   Drainage Amount Moderate 06/04/24 1428   Drainage Characteristics/Odor [place] 06/04/24 1428   Appearance Red;Granulating;Tan;Fibrin 06/04/24 1428   Red (%), Wound Tissue Color 80 % 06/04/24 1428   Periwound Area Moist;[place];Scar tissue 06/04/24 1428   Wound Edges Defined;Rolled/closed 06/04/24 1428   Wound Length (cm) 3 cm 06/04/24 1428   Wound Width (cm) 2.7 cm 06/04/24 1428   Wound Depth (cm) 1.5 cm 06/04/24 1428   Wound Volume (cm^3) 12.15 cm^3 06/04/24 1428   Wound Surface Area (cm^2) 8.1 cm^2 06/04/24 1428   Undermining  (depth (cm)/location) 360 degree; 2.3cm@ 3 o'clock 06/04/24 1428   Care Cleansed with:;Antimicrobial agent;Wound cleanser 06/04/24 1428   Dressing Applied;Methylene blue/gentian violet;Gauze;Absorptive Pad 06/04/24 1428   Periwound Care Skin barrier film applied 06/04/24 1428            Incision/Site 02/27/24 1450 Right Buttocks lateral (Active)   02/27/24 1450   Present Prior to Hospital Arrival?:    Side: Right   Location: Buttocks   Orientation: lateral   Incision Type:    Closure Method:    Additional Comments: mesalt, quick clot, gauze, abdomen pad, and tape   Removal Indication and Assessment:    Wound Outcome:    Removal Indications:    Wound Image   06/04/24 1428   Dressing Appearance Intact;Moist drainage 06/04/24 1428   Drainage Amount Moderate 06/04/24 1428   Drainage Characteristics/Odor Serosanguineous 06/04/24 1428   Appearance Red;Granulating;Maroon 06/04/24 1428   Red (%), Wound Tissue Color 100 % 06/04/24 1428   Periwound Area Moist;Pink;Excoriated;Scar tissue 06/04/24 1428   Wound Edges Defined 06/04/24 1428   Wound Length (cm) 7 cm 06/04/24 1428   Wound Width (cm) 11.5 cm 06/04/24 1428   Wound Depth (cm) 0.8 cm 06/04/24 1428   Wound Volume (cm^3) 64.4 cm^3 06/04/24 1428   Wound Surface Area (cm^2) 80.5 cm^2 06/04/24 1428   Undermining (depth (cm)/location) 12-3 o'clock; 6cm @ 3 o'clock 06/04/24 1428   Care Cleansed with:;Antimicrobial agent;Wound cleanser 06/04/24 1428   Dressing Applied;Methylene blue/gentian violet;Foam;Other (comment) 06/04/24 1428   Periwound Care Skin barrier film applied;Hydrocolloid barrier applied;Dry periwound area maintained 06/04/24 1428         Assessment:     Sacral spine wound today is red and granulating with some slough.  7.5 cm x 6 cm with a depth of 2.2 cm.  This area was cleaned and some slough was removed with curette and scissors.  VAC was applied..  Right buttocks wound was reddened granulating today with some slight bruising.  This was 7 cm x 11.5 cm with a  depth of 0.8 cm this was cleaned and Hydrofera Blue was applied and wound VAC.  Left lateral hip was 3 cm by 2.7 cm with a depth of 1.5 cm.  There is some granulating tissue.  This is improving.  Hydrofera Blue was applied with gauze and absorptive pad.  Dressing changes are Tuesday Thursday and Saturday.  Patient will return in 1 week for MD visit.    ICD-10-CM ICD-9-CM   1. Pressure injury of sacral region, stage 4  L89.154 707.03     707.24   2. Pressure injury of right ischium, stage 4  L89.314 707.05     707.24   3. Open wound of left hip, sequela  S71.002S 906.1         Plan:   Tissue pathology and/or culture taken:  [] Yes [x] No   Sharp debridement performed:   [] Yes [x] No   Labs ordered this visit:   [] Yes [x] No   Imaging ordered this visit:   [] Yes [x] No           Orders Placed This Encounter   Procedures    Change dressing     Cleanse wound with wound cleanser  Periwound care: prep periwound with skin prep, allow to dry , frame periwound with hydrocolloid, use hydrocolloid ring in creases , use mastisol as additional adhesive      Sacrum / R lateral buttock:  ( May apply crushed flagyl with  vac dressing change for odor control)  Primary dressing: santyl / black granufoam to sacrum,  Hydrofera blue rope to undermining area of R lateral buttock, Aquacel Ag to distal aspect of right lateral buttock as needed for bleeding; black granufoam   Secondary dressing: wound vac drape.   Bridge wounds and trac pad to lateral hip  NPWT continuous -125mmHg  Frequency: Tuesday / Thursday / Saturday dressing changes  Offloading: turn q 2 hrs, offload, specialty mattress, chair cushion     L hip:   Cleanse with wound cleanser  Pat dry, skin prep periwound,   pack lightly with hydrofera blue, cover with gauze, abd prn, medfix tape.  Change daily     Continue to offload        Follow-up: 1 week in wound care center     [facility]: [facility]     Additional orders:  Home health to continue administering Ceftriaxone  1gm IM injection daily until 10 doses have been administered.   May mix with lidocaine 1% inj 3ml as diluent prn.        Follow up in about 1 week (around 6/11/2024) for MD visit .

## 2024-06-11 ENCOUNTER — HOSPITAL ENCOUNTER (OUTPATIENT)
Dept: WOUND CARE | Facility: HOSPITAL | Age: 59
Discharge: HOME | End: 2024-06-11
Attending: STUDENT IN AN ORGANIZED HEALTH CARE EDUCATION/TRAINING PROGRAM
Payer: MEDICARE

## 2024-06-11 VITALS
HEART RATE: 79 BPM | SYSTOLIC BLOOD PRESSURE: 142 MMHG | OXYGEN SATURATION: 96 % | RESPIRATION RATE: 20 BRPM | TEMPERATURE: 98 F | DIASTOLIC BLOOD PRESSURE: 81 MMHG

## 2024-06-11 DIAGNOSIS — L89.314 PRESSURE INJURY OF RIGHT ISCHIUM, STAGE 4: ICD-10-CM

## 2024-06-11 DIAGNOSIS — S71.002S OPEN WOUND OF LEFT HIP, SEQUELA: ICD-10-CM

## 2024-06-11 DIAGNOSIS — L89.154 PRESSURE INJURY OF SACRAL REGION, STAGE 4: Primary | ICD-10-CM

## 2024-06-11 PROCEDURE — 27000999 HC MEDICAL RECORD PHOTO DOCUMENTATION

## 2024-06-11 PROCEDURE — 99213 OFFICE O/P EST LOW 20 MIN: CPT | Mod: ,,, | Performed by: PEDIATRICS

## 2024-06-11 PROCEDURE — 99213 OFFICE O/P EST LOW 20 MIN: CPT | Mod: 25

## 2024-06-11 PROCEDURE — 97606 NEG PRS WND THER DME>50 SQCM: CPT

## 2024-06-11 NOTE — PROGRESS NOTES
Subjective:       Patient ID: Antonio Galvan II is a 58 y.o. male.    Chief Complaint: Pressure Ulcer (Left hip, sacrum, and right lateral buttock pressure ulcers. Follow up with md for re-evaluation and treatment with MD. )    HPI  Review of Systems      Objective:         Physical Exam       Negative Pressure Wound Therapy  02/29/24 1227 (Active)   02/29/24 1227   Side:    Orientation:    Location: Sacral Spine   Additional Comments: 2 sites NPWT - Sacrum / R lateral Buttock   Removal Indication and Assessment:    Location:    SDO Location:    NPWT Type Vacuum Therapy 06/11/24 1517   Therapy Setting NPWT Continuous therapy 06/11/24 1517   Pressure Setting NPWT 125 mmHg 06/11/24 1517   Therapy Interventions NPWT Dressing changed;Seal intact 06/11/24 1517   Sponges Inserted NPWT Black;4 06/11/24 1517   Sponges Removed NPWT Black;4 06/11/24 1517            Altered Skin Integrity 01/12/23 1530 Sacral spine Full thickness tissue loss with exposed bone, tendon, or muscle. Often includes undermining and tunneling. May extend into muscle and/or supporting structures. (Active)   01/12/23 1530   Altered Skin Integrity Present on Admission - Did Patient arrive to the hospital with altered skin?: yes   Side:    Orientation:    Location: Sacral spine   Wound Number:    Is this injury device related?:    Primary Wound Type:    Description of Altered Skin Integrity: Full thickness tissue loss with exposed bone, tendon, or muscle. Often includes undermining and tunneling. May extend into muscle and/or supporting structures.   Ankle-Brachial Index:    Pulses:    Removal Indication and Assessment:    Wound Outcome:    (Retired) Wound Length (cm):    (Retired) Wound Width (cm):    (Retired) Depth (cm):    Wound Description (Comments):    Removal Indications:    Wound Image    06/11/24 1517   Dressing Appearance Intact;Moist drainage 06/11/24 1517   Drainage Amount Moderate 06/11/24 1517   Drainage Characteristics/Odor  Serosanguineous 06/11/24 1517   Appearance Red;Granulating;Tan;Yellow;Slough 06/11/24 1517   Tissue loss description Full thickness 06/11/24 1517   Red (%), Wound Tissue Color 50 % 06/11/24 1517   Yellow (%), Wound Tissue Color 50 % 06/11/24 1517   Periwound Area Intact;Moist;Pink;Excoriated;Scar tissue 06/11/24 1517   Wound Edges Defined 06/11/24 1517   Wound Length (cm) 6.5 cm 06/11/24 1517   Wound Width (cm) 6.5 cm 06/11/24 1517   Wound Depth (cm) 2.4 cm 06/11/24 1517   Wound Volume (cm^3) 101.4 cm^3 06/11/24 1517   Wound Surface Area (cm^2) 42.25 cm^2 06/11/24 1517   Care Cleansed with:;Antimicrobial agent;Wound cleanser 06/11/24 1517   Dressing Applied;Other (comment) 06/11/24 1517   Periwound Care Skin barrier film applied;Hydrocolloid barrier applied 06/11/24 1517   Off Loading Other (see comments) 06/11/24 1517            Incision/Site 08/16/23 2011 Left Hip lateral (Active)   08/16/23 2011   Present Prior to Hospital Arrival?:    Side: Left   Location: Hip   Orientation: lateral   Incision Type:    Closure Method:    Additional Comments:    Removal Indication and Assessment:    Wound Outcome:    Removal Indications:    Wound Image   06/11/24 1517   Dressing Appearance Intact;Moist drainage 06/11/24 1517   Drainage Amount Moderate 06/11/24 1517   Drainage Characteristics/Odor Serous 06/11/24 1517   Appearance Red;Granulating;Tan;Fibrin 06/11/24 1517   Red (%), Wound Tissue Color 80 % 06/11/24 1517   Periwound Area Moist;[place];Scar tissue 06/11/24 1517   Wound Edges Defined;Rolled/closed 06/11/24 1517   Wound Length (cm) 2.5 cm 06/11/24 1517   Wound Width (cm) 2.5 cm 06/11/24 1517   Wound Depth (cm) 2.4 cm 06/11/24 1517   Wound Volume (cm^3) 15 cm^3 06/11/24 1517   Wound Surface Area (cm^2) 6.25 cm^2 06/11/24 1517   Undermining (depth (cm)/location) 360 degree; 2.2cm @ 3 o'clock 06/11/24 1517   Care Cleansed with:;Antimicrobial agent;Wound cleanser 06/11/24 1517   Dressing Applied;Methylene blue/gentian  violet;Gauze;Absorptive Pad 06/11/24 1517   Periwound Care Skin barrier film applied 06/11/24 1517            Incision/Site 02/27/24 1450 Right Buttocks lateral (Active)   02/27/24 1450   Present Prior to Hospital Arrival?:    Side: Right   Location: Buttocks   Orientation: lateral   Incision Type:    Closure Method:    Additional Comments: mesalt, quick clot, gauze, abdomen pad, and tape   Removal Indication and Assessment:    Wound Outcome:    Removal Indications:    Wound Image   06/11/24 1517   Dressing Appearance Intact;Moist drainage 06/11/24 1517   Drainage Amount Moderate 06/11/24 1517   Drainage Characteristics/Odor Serosanguineous 06/11/24 1517   Appearance Red;Granulating 06/11/24 1517   Red (%), Wound Tissue Color 100 % 06/11/24 1517   Periwound Area Moist;Pink;Excoriated;Scar tissue 06/11/24 1517   Wound Edges Defined 06/11/24 1517   Wound Length (cm) 7 cm 06/11/24 1517   Wound Width (cm) 11.3 cm 06/11/24 1517   Wound Depth (cm) 0.8 cm 06/11/24 1517   Wound Volume (cm^3) 63.28 cm^3 06/11/24 1517   Wound Surface Area (cm^2) 79.1 cm^2 06/11/24 1517   Undermining (depth (cm)/location) 12-3 o' clock; 6 cm @ 3 o'clock 06/11/24 1517   Care Cleansed with:;Antimicrobial agent;Wound cleanser 06/11/24 1517   Dressing Applied;Methylene blue/gentian violet;Foam;Other (comment) 06/11/24 1517   Periwound Care Skin barrier film applied;Hydrocolloid barrier applied;Dry periwound area maintained 06/11/24 1517         Assessment:     Today left hip wound is more red.  Depth is about the same.  Area was cleaned and Hydrofera Blue was applied and foam.  Hydrocolloid barrier was applied.  Today sacral area is reddened granulating with some fibrous material.  Area was cleaned and scissors were used to remove some of the loose fibrin.  Right buttocks is red and granulating.  There is still some tunneling but this is improving.  These 2 areas were cleaned and Hydrofera Blue was applied.  Wound VAC was applied to sacrum and  right lateral buttocks.  Patient will continue to offload.  Left hip dressings will be changed daily.  Wound VAC will be changed on Tuesday Thursday and Saturday.  Patient will return in 1 week for MD visit.    ICD-10-CM ICD-9-CM   1. Pressure injury of sacral region, stage 4  L89.154 707.03     707.24   2. Pressure injury of right ischium, stage 4  L89.314 707.05     707.24   3. Open wound of left hip, sequela  S71.002S 906.1         Plan:   Tissue pathology and/or culture taken:  [] Yes [x] No   Sharp debridement performed:   [] Yes [x] No   Labs ordered this visit:   [] Yes [x] No   Imaging ordered this visit:   [] Yes [x] No           Orders Placed This Encounter   Procedures    Change dressing     Cleanse wound with wound cleanser  Periwound care: prep periwound with skin prep, allow to dry , frame periwound with hydrocolloid, use hydrocolloid ring in creases , use mastisol as additional adhesive      Sacrum / R lateral buttock:  ( May apply crushed flagyl with  vac dressing change for odor control)  Primary dressing: santyl / black granufoam to sacrum,  Hydrofera blue rope to undermining area of R lateral buttock, Aquacel Ag to distal aspect of right lateral buttock as needed for bleeding; black granufoam   Secondary dressing: wound vac drape.   Bridge wounds and trac pad to lateral hip  NPWT continuous -125mmHg  Frequency: Tuesday / Thursday / Saturday dressing changes  Offloading: turn q 2 hrs, offload, specialty mattress, chair cushion     L hip:   Cleanse with wound cleanser  Pat dry, skin prep periwound,   pack lightly with hydrofera blue, cover with gauze, abd prn, medfix tape.  Change daily     Continue to offload        Follow-up: 1 week in wound care center     [facility]: [facility]        Follow up in about 1 week (around 6/18/2024) for MD visit .

## 2024-06-13 NOTE — PATIENT INSTRUCTIONS
Cleanse wound with wound cleanser  Periwound care: prep periwound with skin prep, allow to dry , frame periwound with hydrocolloid, use hydrocolloid ring in creases , use mastisol as additional adhesive      Sacrum / R lateral buttock:  ( May apply crushed flagyl with  vac dressing change for odor control)  Primary dressing: santyl / black granufoam to sacrum, white granufoam to undermining area of R lateral buttock; black granufoam   Secondary dressing: wound vac drape.   Bridge wounds and trac pad to lateral hip  NPWT continuous -125mmHg  Frequency: Tuesday / Thursday / Saturday dressing changes  Offloading: turn q 2 hrs, offload, specialty mattress, chair cushion     L hip:   Cleanse with wound cleanser  Pat dry, skin prep periwound,   pack lightly with hydrofera blue, cover with gauze, abd prn, medfix tape.  Change daily     Continue to offload        Follow-up: 1 week in wound care center     [facility]: [facility]

## 2024-06-18 ENCOUNTER — HOSPITAL ENCOUNTER (OUTPATIENT)
Dept: WOUND CARE | Facility: HOSPITAL | Age: 59
Discharge: HOME | End: 2024-06-18
Attending: STUDENT IN AN ORGANIZED HEALTH CARE EDUCATION/TRAINING PROGRAM
Payer: MEDICARE

## 2024-06-18 VITALS
DIASTOLIC BLOOD PRESSURE: 88 MMHG | HEART RATE: 76 BPM | OXYGEN SATURATION: 98 % | TEMPERATURE: 98 F | SYSTOLIC BLOOD PRESSURE: 131 MMHG | RESPIRATION RATE: 20 BRPM

## 2024-06-18 DIAGNOSIS — L89.314 PRESSURE INJURY OF RIGHT ISCHIUM, STAGE 4: ICD-10-CM

## 2024-06-18 DIAGNOSIS — L89.154 PRESSURE INJURY OF SACRAL REGION, STAGE 4: Primary | ICD-10-CM

## 2024-06-18 DIAGNOSIS — S71.002S OPEN WOUND OF LEFT HIP, SEQUELA: ICD-10-CM

## 2024-06-18 PROCEDURE — 99213 OFFICE O/P EST LOW 20 MIN: CPT | Mod: ,,, | Performed by: PEDIATRICS

## 2024-06-18 PROCEDURE — 99213 OFFICE O/P EST LOW 20 MIN: CPT | Mod: 25

## 2024-06-18 PROCEDURE — 27000999 HC MEDICAL RECORD PHOTO DOCUMENTATION

## 2024-06-18 PROCEDURE — 97606 NEG PRS WND THER DME>50 SQCM: CPT

## 2024-06-18 NOTE — PROGRESS NOTES
Subjective:       Patient ID: Antonio Galvan II is a 58 y.o. male.    Chief Complaint: Pressure Ulcer (Left hip, sacrum, and right lateral buttock pressure ulcers. Follow up with md for re-evaluation and treatment with MD.)    HPI  Review of Systems      Objective:      Temp:  [97.9 °F (36.6 °C)]   Pulse:  [76]   Resp:  [20]   BP: (131)/(88)   SpO2:  [98 %]   Physical Exam       Negative Pressure Wound Therapy  02/29/24 1227 (Active)   02/29/24 1227   Side:    Orientation:    Location: Sacral Spine   Additional Comments: 2 sites NPWT - Sacrum / R lateral Buttock   Removal Indication and Assessment:    Location:    SDO Location:    NPWT Type Vacuum Therapy 06/18/24 1455   Therapy Setting NPWT Continuous therapy 06/18/24 1455   Pressure Setting NPWT 125 mmHg 06/18/24 1455   Therapy Interventions NPWT Dressing changed;Seal intact 06/18/24 1455   Sponges Inserted NPWT Black;3 06/18/24 1455   Sponges Removed NPWT Black;4 06/18/24 1455            Altered Skin Integrity 01/12/23 1530 Sacral spine Full thickness tissue loss with exposed bone, tendon, or muscle. Often includes undermining and tunneling. May extend into muscle and/or supporting structures. (Active)   01/12/23 1530   Altered Skin Integrity Present on Admission - Did Patient arrive to the hospital with altered skin?: yes   Side:    Orientation:    Location: Sacral spine   Wound Number:    Is this injury device related?:    Primary Wound Type:    Description of Altered Skin Integrity: Full thickness tissue loss with exposed bone, tendon, or muscle. Often includes undermining and tunneling. May extend into muscle and/or supporting structures.   Ankle-Brachial Index:    Pulses:    Removal Indication and Assessment:    Wound Outcome:    (Retired) Wound Length (cm):    (Retired) Wound Width (cm):    (Retired) Depth (cm):    Wound Description (Comments):    Removal Indications:    Wound Image   06/18/24 1455   Dressing Appearance Intact;Moist drainage 06/18/24  1455   Drainage Amount Moderate 06/18/24 1455   Drainage Characteristics/Odor Serosanguineous 06/18/24 1455   Appearance Red;Granulating;Tan;Yellow;Slough 06/18/24 1455   Tissue loss description Full thickness 06/18/24 1455   Red (%), Wound Tissue Color 50 % 06/18/24 1455   Yellow (%), Wound Tissue Color 50 % 06/18/24 1455   Periwound Area Intact;Moist;Pink;Scar tissue;Denuded;Excoriated 06/18/24 1455   Wound Edges Defined 06/18/24 1455   Wound Length (cm) 6 cm 06/18/24 1455   Wound Width (cm) 6 cm 06/18/24 1455   Wound Depth (cm) 2 cm 06/18/24 1455   Wound Volume (cm^3) 72 cm^3 06/18/24 1455   Wound Surface Area (cm^2) 36 cm^2 06/18/24 1455   Care Cleansed with:;Antimicrobial agent;Wound cleanser 06/18/24 1455   Dressing Applied;Other (comment) 06/18/24 1455   Periwound Care Skin barrier film applied;Hydrocolloid barrier applied 06/18/24 1455   Off Loading Other (see comments) 06/18/24 1455            Incision/Site 08/16/23 2011 Left Hip lateral (Active)   08/16/23 2011   Present Prior to Hospital Arrival?:    Side: Left   Location: Hip   Orientation: lateral   Incision Type:    Closure Method:    Additional Comments:    Removal Indication and Assessment:    Wound Outcome:    Removal Indications:    Wound Image   06/18/24 1455   Dressing Appearance Intact;Moist drainage 06/18/24 1455   Drainage Amount Moderate 06/18/24 1455   Drainage Characteristics/Odor Serous 06/18/24 1455   Appearance Red;Pink;Granulating;Tan;Fibrin 06/18/24 1455   Red (%), Wound Tissue Color 95 % 06/18/24 1455   Periwound Area Moist;[place] 06/18/24 1455   Wound Edges Defined;Rolled/closed 06/18/24 1455   Wound Length (cm) 3 cm 06/18/24 1455   Wound Width (cm) 2 cm 06/18/24 1455   Wound Depth (cm) 1.7 cm 06/18/24 1455   Wound Volume (cm^3) 10.2 cm^3 06/18/24 1455   Wound Surface Area (cm^2) 6 cm^2 06/18/24 1455   Undermining (depth (cm)/location) 360 degree; 1.9cm @ 3 o'clock 06/18/24 1455   Care Cleansed with:;Antimicrobial agent;Wound cleanser  06/18/24 1455   Dressing Applied;Methylene blue/gentian violet;Gauze;Absorptive Pad 06/18/24 1455   Periwound Care Skin barrier film applied 06/18/24 1455            Incision/Site 02/27/24 1450 Right Buttocks lateral (Active)   02/27/24 1450   Present Prior to Hospital Arrival?:    Side: Right   Location: Buttocks   Orientation: lateral   Incision Type:    Closure Method:    Additional Comments: mesalt, quick clot, gauze, abdomen pad, and tape   Removal Indication and Assessment:    Wound Outcome:    Removal Indications:    Wound Image   06/18/24 1455   Dressing Appearance Intact;Moist drainage 06/18/24 1455   Drainage Amount Moderate 06/18/24 1455   Drainage Characteristics/Odor Serosanguineous 06/18/24 1455   Appearance Red;Granulating;[place];Ecchymotic 06/18/24 1455   Red (%), Wound Tissue Color 100 % 06/18/24 1455   Periwound Area Moist;Pink;Excoriated;Scar tissue 06/18/24 1455   Wound Edges Defined 06/18/24 1455   Wound Length (cm) 6 cm 06/18/24 1455   Wound Width (cm) 11.5 cm 06/18/24 1455   Wound Depth (cm) 0.9 cm 06/18/24 1455   Wound Volume (cm^3) 62.1 cm^3 06/18/24 1455   Wound Surface Area (cm^2) 69 cm^2 06/18/24 1455   Undermining (depth (cm)/location) 12-3 o'clock; 5.8cm @ 3 o'clock 06/18/24 1455   Care Cleansed with:;Antimicrobial agent;Wound cleanser 06/18/24 1455   Dressing Applied;Foam 06/18/24 1455   Periwound Care Skin barrier film applied 06/18/24 1455         Assessment:     Today sacral spine wound is 6 cm x 6 cm with a depth of 2 cm.  This is reddened granulating with some 10 yellow slough.  This is much more improved.  Santyl was applied to the sacrum and wound VAC was applied.  Right buttocks wound today is 6 cm x 11.5 cm.  This is reddened granulating with some wound and ecchymotic areas.  This again is improving.  The undermined area is improving.  Foam was applied and VAC was applied.  Left hip is 3 cm x 2 cm with a depth of 1.7 cm.  There is some undermining of 1.9 cm.  This is pink  granulating and fibrinous.  Hydrofera Blue was applied and gauze was applied.  This is improving also.  VAC changed on Tuesdays Thursday and Saturday days.  Patient will return in 1 week for MD visit.    ICD-10-CM ICD-9-CM   1. Pressure injury of sacral region, stage 4  L89.154 707.03     707.24   2. Pressure injury of right ischium, stage 4  L89.314 707.05     707.24   3. Open wound of left hip, sequela  S71.002S 906.1         Plan:   Tissue pathology and/or culture taken:  [] Yes [x] No   Sharp debridement performed:   [] Yes [x] No   Labs ordered this visit:   [] Yes [x] No   Imaging ordered this visit:   [] Yes [x] No           Orders Placed This Encounter   Procedures    Change dressing     Cleanse wound with wound cleanser  Periwound care: prep periwound with skin prep, allow to dry , frame periwound with hydrocolloid, use hydrocolloid ring in creases , use mastisol as additional adhesive      Sacrum / R lateral buttock:  ( May apply crushed flagyl with  vac dressing change for odor control)  Primary dressing: santyl / black granufoam to sacrum, white granufoam to undermining area of R lateral buttock; black granufoam   Secondary dressing: wound vac drape.   Bridge wounds and trac pad to lateral hip  NPWT continuous -125mmHg  Frequency: Tuesday / Thursday / Saturday dressing changes  Offloading: turn q 2 hrs, offload, specialty mattress, chair cushion     L hip:   Cleanse with wound cleanser  Pat dry, skin prep periwound,   pack lightly with hydrofera blue, cover with gauze, abd prn, medfix tape.  Change daily     Continue to offload        Follow-up: 1 week in wound care center     [facility]        Follow up in about 1 week (around 6/25/2024) for MD visit.

## 2024-06-25 ENCOUNTER — HOSPITAL ENCOUNTER (OUTPATIENT)
Dept: WOUND CARE | Facility: HOSPITAL | Age: 59
Discharge: HOME | End: 2024-06-25
Attending: STUDENT IN AN ORGANIZED HEALTH CARE EDUCATION/TRAINING PROGRAM
Payer: MEDICARE

## 2024-06-25 VITALS
HEART RATE: 64 BPM | SYSTOLIC BLOOD PRESSURE: 111 MMHG | RESPIRATION RATE: 20 BRPM | TEMPERATURE: 98 F | DIASTOLIC BLOOD PRESSURE: 78 MMHG | OXYGEN SATURATION: 98 %

## 2024-06-25 DIAGNOSIS — S71.002S OPEN WOUND OF LEFT HIP, SEQUELA: ICD-10-CM

## 2024-06-25 DIAGNOSIS — L89.314 PRESSURE INJURY OF RIGHT ISCHIUM, STAGE 4: ICD-10-CM

## 2024-06-25 DIAGNOSIS — L89.154 PRESSURE INJURY OF SACRAL REGION, STAGE 4: Primary | ICD-10-CM

## 2024-06-25 PROCEDURE — 99213 OFFICE O/P EST LOW 20 MIN: CPT | Mod: ,,, | Performed by: PEDIATRICS

## 2024-06-25 PROCEDURE — 97606 NEG PRS WND THER DME>50 SQCM: CPT

## 2024-06-25 PROCEDURE — 27000999 HC MEDICAL RECORD PHOTO DOCUMENTATION

## 2024-06-25 PROCEDURE — 99213 OFFICE O/P EST LOW 20 MIN: CPT

## 2024-06-25 NOTE — PROGRESS NOTES
Subjective:       Patient ID: Antonio Galvan II is a 58 y.o. male.    Chief Complaint: Pressure Ulcer (Left hip, sacrum, and right lateral buttock pressure ulcers. Follow up with md for re-evaluation and treatment with MD.)    HPI  Review of Systems      Objective:      Temp:  [98.2 °F (36.8 °C)]   Pulse:  [64]   Resp:  [20]   BP: (111)/(78)   SpO2:  [98 %]   Physical Exam       Negative Pressure Wound Therapy  02/29/24 1227 (Active)   02/29/24 1227   Side:    Orientation:    Location: Sacral Spine   Additional Comments: 2 sites NPWT - Sacrum / R lateral Buttock   Removal Indication and Assessment:    Location:    SDO Location:    NPWT Type Vacuum Therapy 06/25/24 1441   Therapy Setting NPWT Continuous therapy 06/25/24 1441   Pressure Setting NPWT 125 mmHg 06/25/24 1441   Therapy Interventions NPWT Dressing changed;Seal intact 06/25/24 1441   Sponges Inserted NPWT Black;3 06/25/24 1441   Sponges Removed NPWT Black;3 06/25/24 1441            Altered Skin Integrity 01/12/23 1530 Sacral spine Full thickness tissue loss with exposed bone, tendon, or muscle. Often includes undermining and tunneling. May extend into muscle and/or supporting structures. (Active)   01/12/23 1530   Altered Skin Integrity Present on Admission - Did Patient arrive to the hospital with altered skin?: yes   Side:    Orientation:    Location: Sacral spine   Wound Number:    Is this injury device related?:    Primary Wound Type:    Description of Altered Skin Integrity: Full thickness tissue loss with exposed bone, tendon, or muscle. Often includes undermining and tunneling. May extend into muscle and/or supporting structures.   Ankle-Brachial Index:    Pulses:    Removal Indication and Assessment:    Wound Outcome:    (Retired) Wound Length (cm):    (Retired) Wound Width (cm):    (Retired) Depth (cm):    Wound Description (Comments):    Removal Indications:    Wound Image   06/25/24 1441   Dressing Appearance Intact;Moist drainage 06/25/24  1441   Drainage Amount Moderate 06/25/24 1441   Drainage Characteristics/Odor Serosanguineous 06/25/24 1441   Appearance Red;Granulating;Tan;Yellow;Slough 06/25/24 1441   Tissue loss description Full thickness 06/25/24 1441   Red (%), Wound Tissue Color 60 % 06/25/24 1441   Yellow (%), Wound Tissue Color 40 % 06/25/24 1441   Periwound Area Pink;Moist;Scar tissue;Denuded;Intact 06/25/24 1441   Wound Edges Defined 06/25/24 1441   Wound Length (cm) 6.2 cm 06/25/24 1441   Wound Width (cm) 5 cm 06/25/24 1441   Wound Depth (cm) 2 cm 06/25/24 1441   Wound Volume (cm^3) 62 cm^3 06/25/24 1441   Wound Surface Area (cm^2) 31 cm^2 06/25/24 1441   Care Cleansed with:;Antimicrobial agent;Wound cleanser 06/25/24 1441   Dressing Applied;Other (comment) 06/25/24 1441   Periwound Care Skin barrier film applied;Hydrocolloid barrier applied 06/25/24 1441   Off Loading Other (see comments) 06/25/24 1441            Incision/Site 08/16/23 2011 Left Hip lateral (Active)   08/16/23 2011   Present Prior to Hospital Arrival?:    Side: Left   Location: Hip   Orientation: lateral   Incision Type:    Closure Method:    Additional Comments:    Removal Indication and Assessment:    Wound Outcome:    Removal Indications:    Wound Image   06/25/24 1441   Dressing Appearance Intact;Moist drainage 06/25/24 1441   Drainage Amount Moderate 06/25/24 1441   Drainage Characteristics/Odor Serosanguineous 06/25/24 1441   Appearance Red;Pink;Granulating;Tan;Fibrin 06/25/24 1441   Red (%), Wound Tissue Color 90 % 06/25/24 1441   Periwound Area Moist;[place] 06/25/24 1441   Wound Edges Defined;Rolled/closed 06/25/24 1441   Wound Length (cm) 2.5 cm 06/25/24 1441   Wound Width (cm) 2 cm 06/25/24 1441   Wound Depth (cm) 1.7 cm 06/25/24 1441   Wound Volume (cm^3) 8.5 cm^3 06/25/24 1441   Wound Surface Area (cm^2) 5 cm^2 06/25/24 1441   Undermining (depth (cm)/location) 360 degree; 1.9cm @ 3 o'clock 06/25/24 1441   Care Cleansed with:;Antimicrobial agent;Wound cleanser  06/25/24 1441   Dressing Applied;Methylene blue/gentian violet;Gauze;Absorptive Pad 06/25/24 1441   Periwound Care Skin barrier film applied 06/25/24 1441            Incision/Site 02/27/24 1450 Right Buttocks lateral (Active)   02/27/24 1450   Present Prior to Hospital Arrival?:    Side: Right   Location: Buttocks   Orientation: lateral   Incision Type:    Closure Method:    Additional Comments: mesalt, quick clot, gauze, abdomen pad, and tape   Removal Indication and Assessment:    Wound Outcome:    Removal Indications:    Wound Image   06/25/24 1441   Dressing Appearance Intact;Moist drainage 06/25/24 1441   Drainage Amount Moderate 06/25/24 1441   Drainage Characteristics/Odor Serosanguineous 06/25/24 1441   Appearance Red;Granulating;Ecchymotic;[place] 06/25/24 1441   Red (%), Wound Tissue Color 100 % 06/25/24 1441   Periwound Area Pink;Excoriated;Scar tissue 06/25/24 1441   Wound Edges Defined 06/25/24 1441   Wound Length (cm) 5.3 cm 06/25/24 1441   Wound Width (cm) 10.5 cm 06/25/24 1441   Wound Depth (cm) 0.8 cm 06/25/24 1441   Wound Volume (cm^3) 44.52 cm^3 06/25/24 1441   Wound Surface Area (cm^2) 55.65 cm^2 06/25/24 1441   Undermining (depth (cm)/location) 12-3 o'clock; 4.5 @ 1 o'clock 06/25/24 1441   Care Cleansed with:;Antimicrobial agent;Wound cleanser 06/25/24 1441   Dressing Applied;Foam 06/25/24 1441   Periwound Care Skin barrier film applied;Hydrocolloid barrier applied;Dry periwound area maintained 06/25/24 1441         Assessment:     Left hip wound is much more granulated today.  There is increased redness.  Size is improving also.  This was cleaned and Hydrofera Blue was applied with gauze and tape.  Right buttocks wound is also smaller and extremely well granulating.  Scar tissue has improved.  Sacrum also has improved with decreased slough and increased granulation tissue.  These 2 areas were cleaned and Santyl was applied with wound VAC.  Dressings will continue to be changed Tuesday Thursday  and Saturday.  Patient will return in 2 weeks for MD visit.    ICD-10-CM ICD-9-CM   1. Pressure injury of sacral region, stage 4  L89.154 707.03     707.24   2. Pressure injury of right ischium, stage 4  L89.314 707.05     707.24   3. Open wound of left hip, sequela  S71.002S 906.1         Plan:   Tissue pathology and/or culture taken:  [] Yes [x] No   Sharp debridement performed:   [] Yes [x] No   Labs ordered this visit:   [] Yes [x] No   Imaging ordered this visit:   [] Yes [x] No           Orders Placed This Encounter   Procedures    Change dressing     Cleanse wound with wound cleanser  Periwound care: prep periwound with skin prep, allow to dry , frame periwound with hydrocolloid, use hydrocolloid ring in creases , use mastisol as additional adhesive      Sacrum / R lateral buttock:  ( May apply crushed flagyl with  vac dressing change for odor control)  Primary dressing: santyl / black granufoam to sacrum, white granufoam to undermining area of R lateral buttock; black granufoam   Secondary dressing: wound vac drape.   Bridge wounds and trac pad to lateral hip  NPWT continuous -125mmHg  Frequency: Tuesday / Thursday / Saturday dressing changes  Offloading: turn q 2 hrs, offload, specialty mattress, chair cushion     L hip:   Cleanse with wound cleanser  Pat dry, skin prep periwound,   pack lightly with hydrofera blue, cover with gauze, abd prn, medfix tape.  Change daily     Continue to offload        Follow-up: 2 weeks in wound care center     [facility]        Follow up in about 2 weeks (around 7/9/2024) for MD visit.

## 2024-06-25 NOTE — PATIENT INSTRUCTIONS
Cleanse wound with wound cleanser  Periwound care: prep periwound with skin prep, allow to dry , frame periwound with hydrocolloid, use hydrocolloid ring in creases , use mastisol as additional adhesive      Sacrum / R lateral buttock:  ( May apply crushed flagyl with  vac dressing change for odor control)  Primary dressing: santyl / black granufoam to sacrum, white granufoam to undermining area of R lateral buttock; black granufoam   Secondary dressing: wound vac drape.   Bridge wounds and trac pad to lateral hip  NPWT continuous -125mmHg  Frequency: Tuesday / Thursday / Saturday dressing changes  Offloading: turn q 2 hrs, offload, specialty mattress, chair cushion     L hip:   Cleanse with wound cleanser  Pat dry, skin prep periwound,   pack lightly with hydrofera blue, cover with gauze, abd prn, medfix tape.  Change daily     Continue to offload        Follow-up: 2 weeks in wound care center     [facility]: [facility]

## 2024-07-09 ENCOUNTER — LAB VISIT (OUTPATIENT)
Dept: LAB | Facility: HOSPITAL | Age: 59
End: 2024-07-09
Attending: PEDIATRICS
Payer: MEDICARE

## 2024-07-09 ENCOUNTER — HOSPITAL ENCOUNTER (OUTPATIENT)
Dept: WOUND CARE | Facility: HOSPITAL | Age: 59
Discharge: HOME | End: 2024-07-09
Attending: STUDENT IN AN ORGANIZED HEALTH CARE EDUCATION/TRAINING PROGRAM
Payer: MEDICARE

## 2024-07-09 VITALS
TEMPERATURE: 98 F | RESPIRATION RATE: 20 BRPM | SYSTOLIC BLOOD PRESSURE: 106 MMHG | DIASTOLIC BLOOD PRESSURE: 70 MMHG | OXYGEN SATURATION: 100 % | HEART RATE: 65 BPM

## 2024-07-09 DIAGNOSIS — L89.314 PRESSURE INJURY OF RIGHT ISCHIUM, STAGE 4: ICD-10-CM

## 2024-07-09 DIAGNOSIS — L89.154 PRESSURE INJURY OF SACRAL REGION, STAGE 4: Primary | ICD-10-CM

## 2024-07-09 DIAGNOSIS — L89.154 PRESSURE INJURY OF SACRAL REGION, STAGE 4: ICD-10-CM

## 2024-07-09 DIAGNOSIS — S71.002S OPEN WOUND OF LEFT HIP, SEQUELA: ICD-10-CM

## 2024-07-09 LAB
ALBUMIN SERPL-MCNC: 2.8 G/DL (ref 3.5–5)
ALBUMIN/GLOB SERPL: 0.7 RATIO (ref 1.1–2)
ALP SERPL-CCNC: 96 UNIT/L (ref 40–150)
ALT SERPL-CCNC: 13 UNIT/L (ref 0–55)
ANION GAP SERPL CALC-SCNC: 9 MEQ/L
AST SERPL-CCNC: 8 UNIT/L (ref 5–34)
BASOPHILS # BLD AUTO: 0.03 X10(3)/MCL
BASOPHILS NFR BLD AUTO: 0.3 %
BILIRUB SERPL-MCNC: 0.2 MG/DL
BUN SERPL-MCNC: 29.1 MG/DL (ref 8.4–25.7)
CALCIUM SERPL-MCNC: 9.1 MG/DL (ref 8.4–10.2)
CHLORIDE SERPL-SCNC: 109 MMOL/L (ref 98–107)
CO2 SERPL-SCNC: 19 MMOL/L (ref 22–29)
CREAT SERPL-MCNC: 1.2 MG/DL (ref 0.73–1.18)
CREAT/UREA NIT SERPL: 24
EOSINOPHIL # BLD AUTO: 0.29 X10(3)/MCL (ref 0–0.9)
EOSINOPHIL NFR BLD AUTO: 2.8 %
ERYTHROCYTE [DISTWIDTH] IN BLOOD BY AUTOMATED COUNT: 14.7 % (ref 11.5–17)
GFR SERPLBLD CREATININE-BSD FMLA CKD-EPI: >60 ML/MIN/1.73/M2
GLOBULIN SER-MCNC: 4.3 GM/DL (ref 2.4–3.5)
GLUCOSE SERPL-MCNC: 333 MG/DL (ref 74–100)
HCT VFR BLD AUTO: 33.5 % (ref 42–52)
HGB BLD-MCNC: 10.9 G/DL (ref 14–18)
IMM GRANULOCYTES # BLD AUTO: 0.03 X10(3)/MCL (ref 0–0.04)
IMM GRANULOCYTES NFR BLD AUTO: 0.3 %
LYMPHOCYTES # BLD AUTO: 1.65 X10(3)/MCL (ref 0.6–4.6)
LYMPHOCYTES NFR BLD AUTO: 15.9 %
MCH RBC QN AUTO: 27.8 PG (ref 27–31)
MCHC RBC AUTO-ENTMCNC: 32.5 G/DL (ref 33–36)
MCV RBC AUTO: 85.5 FL (ref 80–94)
MONOCYTES # BLD AUTO: 0.49 X10(3)/MCL (ref 0.1–1.3)
MONOCYTES NFR BLD AUTO: 4.7 %
NEUTROPHILS # BLD AUTO: 7.91 X10(3)/MCL (ref 2.1–9.2)
NEUTROPHILS NFR BLD AUTO: 76 %
PLATELET # BLD AUTO: 336 X10(3)/MCL (ref 130–400)
PMV BLD AUTO: 10.8 FL (ref 7.4–10.4)
POTASSIUM SERPL-SCNC: 4.8 MMOL/L (ref 3.5–5.1)
PROT SERPL-MCNC: 7.1 GM/DL (ref 6.4–8.3)
RBC # BLD AUTO: 3.92 X10(6)/MCL (ref 4.7–6.1)
SODIUM SERPL-SCNC: 137 MMOL/L (ref 136–145)
WBC # BLD AUTO: 10.4 X10(3)/MCL (ref 4.5–11.5)

## 2024-07-09 PROCEDURE — 87186 SC STD MICRODIL/AGAR DIL: CPT | Mod: 91

## 2024-07-09 PROCEDURE — 11044 DBRDMT BONE 1ST 20 SQ CM/<: CPT

## 2024-07-09 PROCEDURE — 36415 COLL VENOUS BLD VENIPUNCTURE: CPT

## 2024-07-09 PROCEDURE — 85025 COMPLETE CBC W/AUTO DIFF WBC: CPT

## 2024-07-09 PROCEDURE — 11047 DBRDMT BONE EACH ADDL: CPT

## 2024-07-09 PROCEDURE — 99213 OFFICE O/P EST LOW 20 MIN: CPT

## 2024-07-09 PROCEDURE — 27000999 HC MEDICAL RECORD PHOTO DOCUMENTATION

## 2024-07-09 PROCEDURE — 80053 COMPREHEN METABOLIC PANEL: CPT

## 2024-07-09 PROCEDURE — 99213 OFFICE O/P EST LOW 20 MIN: CPT | Mod: ,,, | Performed by: PEDIATRICS

## 2024-07-09 RX ORDER — DOXYCYCLINE 100 MG/1
100 CAPSULE ORAL 2 TIMES DAILY
Qty: 20 CAPSULE | Refills: 0 | Status: SHIPPED | OUTPATIENT
Start: 2024-07-09 | End: 2024-07-19

## 2024-07-09 NOTE — PROGRESS NOTES
Subjective:       Patient ID: Antonio Galvan II is a 58 y.o. male.    Chief Complaint: Pressure Ulcer (Left hip, sacrum, and right lateral buttock pressure ulcers. Patient reports elevated glucose readings; caregiver reported that wound vac has been off since Friday due to being contaminated and out of town on vacation.   Follow up for re-evaluation and treatment with MD.)    HPI  Review of Systems      Objective:      Temp:  [97.7 °F (36.5 °C)]   Pulse:  [65]   Resp:  [20]   BP: (106)/(70)   SpO2:  [100 %]   Physical Exam       Negative Pressure Wound Therapy  02/29/24 1227 (Active)   02/29/24 1227   Side:    Orientation:    Location: Sacral Spine   Additional Comments: 2 sites NPWT - Sacrum / R lateral Buttock   Removal Indication and Assessment:    Location:    SDO Location:    NPWT Type Vacuum Therapy 07/09/24 1421            Altered Skin Integrity 01/12/23 1530 Sacral spine Full thickness tissue loss with exposed bone, tendon, or muscle. Often includes undermining and tunneling. May extend into muscle and/or supporting structures. (Active)   01/12/23 1530   Altered Skin Integrity Present on Admission - Did Patient arrive to the hospital with altered skin?: yes   Side:    Orientation:    Location: Sacral spine   Wound Number:    Is this injury device related?:    Primary Wound Type:    Description of Altered Skin Integrity: Full thickness tissue loss with exposed bone, tendon, or muscle. Often includes undermining and tunneling. May extend into muscle and/or supporting structures.   Ankle-Brachial Index:    Pulses:    Removal Indication and Assessment:    Wound Outcome:    (Retired) Wound Length (cm):    (Retired) Wound Width (cm):    (Retired) Depth (cm):    Wound Description (Comments):    Removal Indications:    Wound Image    07/09/24 1421   Dressing Appearance Intact;Moist drainage 07/09/24 1421   Drainage Amount Moderate 07/09/24 1421   Drainage Characteristics/Odor Serosanguineous;Creamy 07/09/24  1421   Appearance Red;Granulating;Tan;Yellow;Purple;Necrotic;Slough;Bone 07/09/24 1421   Tissue loss description Full thickness 07/09/24 1421   Red (%), Wound Tissue Color 60 % 07/09/24 1421   Yellow (%), Wound Tissue Color 40 % 07/09/24 1421   Periwound Area Pink;Moist;Denuded;Scar tissue 07/09/24 1421   Wound Edges Defined 07/09/24 1421   Wound Length (cm) 7 cm 07/09/24 1421   Wound Width (cm) 5.5 cm 07/09/24 1421   Wound Depth (cm) 2.7 cm 07/09/24 1421   Wound Volume (cm^3) 103.95 cm^3 07/09/24 1421   Wound Surface Area (cm^2) 38.5 cm^2 07/09/24 1421   Care Cleansed with:;Antimicrobial agent;Wound cleanser;Debrided;Other (see comments) 07/09/24 1421   Dressing Applied;Methylene blue/gentian violet;Foam;Gauze;Non-adherent;Absorptive Pad;Other (comment) 07/09/24 1421   Periwound Care Skin barrier film applied;Hydrocolloid barrier applied 07/09/24 1421   Off Loading Other (see comments) 07/09/24 1421            Incision/Site 08/16/23 2011 Left Hip lateral (Active)   08/16/23 2011   Present Prior to Hospital Arrival?:    Side: Left   Location: Hip   Orientation: lateral   Incision Type:    Closure Method:    Additional Comments:    Removal Indication and Assessment:    Wound Outcome:    Removal Indications:    Wound Image   07/09/24 1421   Dressing Appearance Intact;Moist drainage 07/09/24 1421   Drainage Amount Moderate 07/09/24 1421   Drainage Characteristics/Odor Serosanguineous 07/09/24 1421   Appearance Red;Pink;Granulating;Tan;Fibrin 07/09/24 1421   Red (%), Wound Tissue Color 90 % 07/09/24 1421   Periwound Area Moist;[place];Scar tissue 07/09/24 1421   Wound Edges Defined;Rolled/closed 07/09/24 1421   Wound Length (cm) 3 cm 07/09/24 1421   Wound Width (cm) 1.8 cm 07/09/24 1421   Wound Depth (cm) 1.1 cm 07/09/24 1421   Wound Volume (cm^3) 5.94 cm^3 07/09/24 1421   Wound Surface Area (cm^2) 5.4 cm^2 07/09/24 1421   Undermining (depth (cm)/location) 3-10 o'clock; 2@ 3 o'clock 07/09/24 1421   Care Cleansed  with:;Antimicrobial agent;Wound cleanser 07/09/24 1421   Dressing Applied;Methylene blue/gentian violet;Gauze;Absorptive Pad 07/09/24 1421   Periwound Care Skin barrier film applied 07/09/24 1421            Incision/Site 02/27/24 1450 Right Buttocks lateral (Active)   02/27/24 1450   Present Prior to Hospital Arrival?:    Side: Right   Location: Buttocks   Orientation: lateral   Incision Type:    Closure Method:    Additional Comments: mesalt, quick clot, gauze, abdomen pad, and tape   Removal Indication and Assessment:    Wound Outcome:    Removal Indications:    Wound Image   07/09/24 1421   Dressing Appearance Intact;Moist drainage 07/09/24 1421   Drainage Amount Large 07/09/24 1421   Drainage Characteristics/Odor Green;Serosanguineous 07/09/24 1421   Appearance Granulating;Maroon;Red;Gray;Slough 07/09/24 1421   Red (%), Wound Tissue Color 95 % 07/09/24 1421   Periwound Area Pink;Excoriated;Scar tissue;Maroon 07/09/24 1421   Wound Edges Defined 07/09/24 1421   Wound Length (cm) 6 cm 07/09/24 1421   Wound Width (cm) 12 cm 07/09/24 1421   Wound Depth (cm) 1.2 cm 07/09/24 1421   Wound Volume (cm^3) 86.4 cm^3 07/09/24 1421   Wound Surface Area (cm^2) 72 cm^2 07/09/24 1421   Undermining (depth (cm)/location) 12-3 o'clock; 4.5cm @ 1 o'clock 07/09/24 1421   Care Cleansed with:;Antimicrobial agent;Wound cleanser 07/09/24 1421   Dressing Applied;Methylene blue/gentian violet;Foam;Non-adherent;Absorptive Pad;Other (comment) 07/09/24 1421   Periwound Care Skin barrier film applied 07/09/24 1421         Assessment:     Patient returns after having on vacation.  2 days ago he had a malfunction of his wound VAC.  Patient also states that his sugar was high (450) and he states that this gets that way when he gets infections.  Today the left hip area had no drainage.  And was packed with Hydrofera Blue.  Right hip had greenish drainage with areas.  This was cleaned and Hydrofera Blue and Santyl was used.  Sacral wound had  greenish drainage and had necrotic area of spinal bone.  Because of this a excisional debridement was done.  Bone tissue was taken for culture.  This area was cleaned and Santyl and Hydrofera Blue were applied.  Wound VAC is put on hold because of infections.  CBC CMP UA and a BG these were done.  Patient was started on doxycycline.  Dressings will be changed daily.  Patient will be seen for MD visit in 1 week or sooner if necessary.    ICD-10-CM ICD-9-CM   1. Pressure injury of sacral region, stage 4  L89.154 707.03     707.24   2. Pressure injury of right ischium, stage 4  L89.314 707.05     707.24   3. Open wound of left hip, sequela  S71.002S 906.1         Plan:   Tissue pathology and/or culture taken:  [x] Yes [] No   Sharp debridement performed:   [x] Yes [] No   Labs ordered this visit:   [x] Yes [] No   Imaging ordered this visit:   [] Yes [x] No           Orders Placed This Encounter   Procedures    Arterial blood gas     Standing Status:   Future     Standing Expiration Date:   7/9/2025    Debridement     This order was created via procedure documentation     Standing Status:   Standing     Number of Occurrences:   1    Tissue Culture - Aerobic     Bone and tissue    CBC Auto Differential     Standing Status:   Future     Number of Occurrences:   1     Standing Expiration Date:   10/7/2025    Comprehensive Metabolic Panel     Standing Status:   Future     Number of Occurrences:   1     Standing Expiration Date:   10/7/2025    Urinalysis, Reflex to Urine Culture     Standing Status:   Future     Standing Expiration Date:   9/7/2025     Order Specific Question:   Specimen Source     Answer:   Urine    Change dressing     Wound vac on hold as of 7/9/24    Cleanse wounds with wound cleanser, pat dry  Periwound care: prep periwound with skin prep, allow to dry     Sacrum:   Primary dressing: apply santyl to wound bed followed by hydrofera blue  Secondary dressing: apply fluffed gauze then cover with Mextra  superabsorbent pad, secure with tape  Frequency: daily    R lateral buttock:  Primary dressing: apply hydrofera blue to wound bed  Secondary dressing: apply fluffed gauzed then cover with Mextra superabsorbent pad, secure with tape  Frequency: daily     L hip:   pack lightly with hydrofera blue, cover with gauze, abd prn, medfix tape.  Change daily     Offloading: turn q 2 hrs, offload, specialty mattress, chair cushion  Continue to offload     A tissue culture was obtained at today's visit. Antibiotics have been ordered, pickup and take as prescribed.  Antibiotics may be changed if needed when final culture results are obtained.        Outpatient diagnostic tests have been ordered at [facility]  Go to outpatient dept today for lab tests  If MRI, Ultrasound, CT ordered, you will be notified of appt time by outpatient scheduling department     Home health to obtain a urine culture at next visit (tomorrow)     [facility]: [facility]    Follow-up: 1 week in wound care center        Follow up in about 1 week (around 7/16/2024) for MD visit.

## 2024-07-09 NOTE — PATIENT INSTRUCTIONS
Wound vac on hold as of 7/9/24    Cleanse wounds with wound cleanser, pat dry  Periwound care: prep periwound with skin prep, allow to dry     Sacrum:   Primary dressing: apply santyl to wound bed followed by hydrofera blue  Secondary dressing: apply fluffed gauze then cover with Mextra superabsorbent pad, secure with tape  Frequency: daily    R lateral buttock:  Primary dressing: apply hydrofera blue to wound bed  Secondary dressing: apply fluffed gauzed then cover with Mextra superabsorbent pad, secure with tape  Frequency: daily     L hip:   pack lightly with hydrofera blue, cover with gauze, abd prn, medfix tape.  Change daily     Offloading: turn q 2 hrs, offload, specialty mattress, chair cushion  Continue to offload     A tissue culture was obtained at today's visit. Antibiotics have been ordered, pickup and take as prescribed.  Antibiotics may be changed if needed when final culture results are obtained.        Outpatient diagnostic tests have been ordered at [facility]  Go to outpatient dept today for lab tests  If MRI, Ultrasound, CT ordered, you will be notified of appt time by outpatient scheduling department     Home health to obtain a urine culture at next visit (tomorrow)     [facility]: [facility]    Follow-up: 1 week in wound care center

## 2024-07-09 NOTE — PROCEDURES
"Debridement    Date/Time: 7/9/2024 2:17 PM    Performed by: Ronald Barcenas MD  Authorized by: Ronald Barcenas MD  Associated wounds:        Altered Skin Integrity 01/12/23 1530 Sacral spine Full thickness tissue loss with exposed bone, tendon, or muscle. Often includes undermining and tunneling. May extend into muscle and/or supporting structures.  Time out: Immediately prior to procedure a "time out" was called to verify the correct patient, procedure, equipment, support staff and site/side marked as required.    Consent Done?:  Yes (Verbal) and Yes (Written)    Preparation: Patient was prepped and draped with clean technique    Local anesthesia used?: No      Wound Details:    Location:  Sacrum    Type of Debridement:  Excisional       Length (cm):  7       Area (sq cm):  38.5       Width (cm):  5.5       Percent Debrided (%):  100       Depth (cm):  3       Total Area Debrided (sq cm):  38.5    Depth of debridement:  Bone    Tissue debrided:  Bone and Adipose    Devitalized tissue debrided:  Necrotic/Eschar    Instruments:  Curette, Scissors and Forceps  Bleeding:  Moderate  Hemostasis Achieved: Yes  Method Used:  Pressure  Patient tolerance:  Patient tolerated the procedure well with no immediate complications  Specimen Collected: Specimen sent to microbiology  "

## 2024-07-10 NOTE — ADDENDUM NOTE
Encounter addended by: Nguyen Pacheco RN on: 7/10/2024 3:29 PM   Actions taken: Clinical Note Signed

## 2024-07-10 NOTE — PROGRESS NOTES
Received telephone call from Shalini, pt's caregiver regarding lab results.  Dr. Barcenas reviewed results, notified me to relay the following information.   Although anemic, pt 's H&H isn't as low as it usually is, blood glucose is relatively high and pt should discuss with his PCP, ABG does not indicate acidosis.   Notified Shalini of this per telephone, wound culture results pending.

## 2024-07-12 RX ORDER — LEVOFLOXACIN 750 MG/1
750 TABLET ORAL DAILY
Qty: 10 TABLET | Refills: 0 | Status: SHIPPED | OUTPATIENT
Start: 2024-07-12 | End: 2024-07-22

## 2024-07-12 NOTE — PROGRESS NOTES
Culture results received.    Pt is currently on Doxycycline, not covering bacteria.   Results and sensitivities called to Dr. Barcenas.   Orders obtained for pt to discontinue Doxycycline,  and take Levaquin 750mg po 1 tab daily x 10 days.  Sent electronically to pt's pharmacy.   Notified Shalini pt's caregiver  per telephone of new abx orders and to discontinue Doxycycline

## 2024-07-13 LAB
BACTERIA TISS AEROBE CULT: ABNORMAL

## 2024-07-16 DIAGNOSIS — L89.314 PRESSURE INJURY OF RIGHT ISCHIUM, STAGE 4: Primary | Chronic | ICD-10-CM

## 2024-07-16 DIAGNOSIS — L89.154 SACRAL DECUBITUS ULCER, STAGE IV: ICD-10-CM

## 2024-07-16 DIAGNOSIS — L89.224 PRESSURE INJURY OF LEFT HIP, STAGE 4: ICD-10-CM

## 2024-07-16 NOTE — PROGRESS NOTES
Dr. Barcenas reviewed culture results, orders obtained to send results to [facility] for topical compound

## 2024-07-17 ENCOUNTER — HOSPITAL ENCOUNTER (OUTPATIENT)
Dept: WOUND CARE | Facility: HOSPITAL | Age: 59
Discharge: HOME | End: 2024-07-17
Attending: STUDENT IN AN ORGANIZED HEALTH CARE EDUCATION/TRAINING PROGRAM
Payer: MEDICARE

## 2024-07-17 VITALS
TEMPERATURE: 98 F | OXYGEN SATURATION: 96 % | DIASTOLIC BLOOD PRESSURE: 54 MMHG | HEART RATE: 87 BPM | SYSTOLIC BLOOD PRESSURE: 131 MMHG | RESPIRATION RATE: 18 BRPM

## 2024-07-17 DIAGNOSIS — L89.314 PRESSURE INJURY OF RIGHT ISCHIUM, STAGE 4: Primary | ICD-10-CM

## 2024-07-17 DIAGNOSIS — L89.224 PRESSURE INJURY OF LEFT HIP, STAGE 4: ICD-10-CM

## 2024-07-17 DIAGNOSIS — L89.154 SACRAL DECUBITUS ULCER, STAGE IV: ICD-10-CM

## 2024-07-17 PROCEDURE — 99214 OFFICE O/P EST MOD 30 MIN: CPT

## 2024-07-17 PROCEDURE — 99213 OFFICE O/P EST LOW 20 MIN: CPT | Mod: ,,, | Performed by: PEDIATRICS

## 2024-07-17 PROCEDURE — 27000999 HC MEDICAL RECORD PHOTO DOCUMENTATION

## 2024-07-17 NOTE — PROGRESS NOTES
Subjective:       Patient ID: Antonio Galvan II is a 58 y.o. male.    Chief Complaint: Pressure Ulcer (Stg 4 pressure ulcers to R ischium / L hip and sacrum.   Here for re-evaluation, culture and lab results from last visit.  )    HPI  Review of Systems      Objective:      Temp:  [98.2 °F (36.8 °C)]   Pulse:  [87]   Resp:  [18]   BP: (131)/(54)   SpO2:  [96 %]   Physical Exam       Altered Skin Integrity 01/12/23 1530 Sacral spine Full thickness tissue loss with exposed bone, tendon, or muscle. Often includes undermining and tunneling. May extend into muscle and/or supporting structures. (Active)   01/12/23 1530   Altered Skin Integrity Present on Admission - Did Patient arrive to the hospital with altered skin?: yes   Side:    Orientation:    Location: Sacral spine   Wound Number:    Is this injury device related?:    Primary Wound Type:    Description of Altered Skin Integrity: Full thickness tissue loss with exposed bone, tendon, or muscle. Often includes undermining and tunneling. May extend into muscle and/or supporting structures.   Ankle-Brachial Index:    Pulses:    Removal Indication and Assessment:    Wound Outcome:    (Retired) Wound Length (cm):    (Retired) Wound Width (cm):    (Retired) Depth (cm):    Wound Description (Comments):    Removal Indications:    Wound Image   07/17/24 1453   Dressing Appearance Intact;Moist drainage 07/17/24 1453   Drainage Amount Moderate 07/17/24 1453   Drainage Characteristics/Odor Serosanguineous 07/17/24 1453   Appearance Red;Granulating;Tan;White;Yellow;Slough;Fibrin;Bone 07/17/24 1453   Tissue loss description Full thickness 07/17/24 1453   Red (%), Wound Tissue Color 75 % 07/17/24 1453   Yellow (%), Wound Tissue Color 25 % 07/17/24 1453   Periwound Area Scar tissue;Moist;Excoriated 07/17/24 1453   Wound Edges Defined 07/17/24 1453   Wound Length (cm) 7.4 cm 07/17/24 1453   Wound Width (cm) 5.2 cm 07/17/24 1453   Wound Depth (cm) 2.2 cm 07/17/24 1453   Wound  Volume (cm^3) 84.656 cm^3 07/17/24 1453   Wound Surface Area (cm^2) 38.48 cm^2 07/17/24 1453   Care Cleansed with:;Antimicrobial agent;Wound cleanser 07/17/24 1453   Dressing Applied;Sodium chloride impregnated;Foam;Gauze;Absorptive Pad;Other (comment) 07/17/24 1453   Periwound Care Skin barrier film applied 07/17/24 1453   Off Loading Other (see comments) 07/17/24 1453            Incision/Site 08/16/23 2011 Left Hip lateral (Active)   08/16/23 2011   Present Prior to Hospital Arrival?:    Side: Left   Location: Hip   Orientation: lateral   Incision Type:    Closure Method:    Additional Comments:    Removal Indication and Assessment:    Wound Outcome:    Removal Indications:    Wound Image   07/17/24 1453   Dressing Appearance Intact;Moist drainage 07/17/24 1453   Drainage Amount Moderate 07/17/24 1453   Drainage Characteristics/Odor [place] 07/17/24 1453   Appearance Pink;Red;Granulating;Tan;Fibrin 07/17/24 1453   Red (%), Wound Tissue Color 90 % 07/17/24 1453   Periwound Area Moist;Scar tissue 07/17/24 1453   Wound Edges Defined;Rolled/closed 07/17/24 1453   Wound Length (cm) 3.5 cm 07/17/24 1453   Wound Width (cm) 2 cm 07/17/24 1453   Wound Depth (cm) 1.7 cm 07/17/24 1453   Wound Volume (cm^3) 11.9 cm^3 07/17/24 1453   Wound Surface Area (cm^2) 7 cm^2 07/17/24 1453   Undermining (depth (cm)/location) 360 undermining / 3.0cm @ 4 o'clock 07/17/24 1453   Care Cleansed with:;Antimicrobial agent;Wound cleanser 07/17/24 1453   Dressing Applied;Sodium chloride impregnated;Gauze;Absorptive Pad;Other (comment) 07/17/24 1453   Periwound Care Skin barrier film applied 07/17/24 1453            Incision/Site 02/27/24 1450 Right Buttocks lateral (Active)   02/27/24 1450   Present Prior to Hospital Arrival?:    Side: Right   Location: Buttocks   Orientation: lateral   Incision Type:    Closure Method:    Additional Comments: mesalt, quick clot, gauze, abdomen pad, and tape   Removal Indication and Assessment:    Wound Outcome:     Removal Indications:    Wound Image   07/17/24 1453   Dressing Appearance Intact;Moist drainage 07/17/24 1453   Drainage Amount Moderate 07/17/24 1453   Drainage Characteristics/Odor Serosanguineous;No odor;Bleeding controlled 07/17/24 1453   Appearance Red;Granulating 07/17/24 1453   Red (%), Wound Tissue Color 100 % 07/17/24 1453   Periwound Area Scar tissue;Intact 07/17/24 1453   Wound Edges Defined 07/17/24 1453   Wound Length (cm) 6 cm 07/17/24 1453   Wound Width (cm) 11.4 cm 07/17/24 1453   Wound Depth (cm) 0.4 cm 07/17/24 1453   Wound Volume (cm^3) 27.36 cm^3 07/17/24 1453   Wound Surface Area (cm^2) 68.4 cm^2 07/17/24 1453   Undermining (depth (cm)/location) 2-3 o'clock / 4.1cm 07/17/24 1453   Care Cleansed with: 07/17/24 1453   Dressing Applied;Sodium chloride impregnated;Gauze;Absorptive Pad 07/17/24 1453   Periwound Care Skin barrier film applied 07/17/24 1453         Assessment:     Left hip is 3.5 cm x 2 cm with a depth of 1.7 cm.  This is red and granulating today.  There is some 10 fibrin.  Area was cleaned and topical antibiotic and Mesalt applied.  Sacral wound is yellow areas some fibrin and small amount of exposed bone.  0.4 cm x 5.2 cm with a depth of 2.2 cm.  Mesalt and Santyl was applied.  Along with topical antibiotic.  This has improved since last week.  Right buttocks wound well granulating today.  6 cm x 11.4 cm with a depth of 0.4 cm again area was cleaned and antibiotic ointment was applied with Mesalt.  All wounds were covered with supra absorptive pad and secured with tape.  Dressings will be changed daily.  Patient oral antibiotics and will return in 1 week for MD visit.    ICD-10-CM ICD-9-CM   1. Pressure injury of right ischium, stage 4  L89.314 707.05     707.24   2. Sacral decubitus ulcer, stage IV  L89.154 707.03     707.24   3. Pressure injury of left hip, stage 4  L89.224 707.04     707.24         Plan:   Tissue pathology and/or culture taken:  [] Yes [x] No   Sharp  debridement performed:   [] Yes [x] No   Labs ordered this visit:   [] Yes [x] No   Imaging ordered this visit:   [] Yes [x] No           Orders Placed This Encounter   Procedures    Change dressing     Cleanse wound with Vashe  Periwound care: prep periwound , allow to dry  Apply Santyl nickel thick only to necrotic tissue on Sacrum - then dress all as directed below.  Primary dressing: R buttock / Sacrum / L hip - apply topical antibiotic compound (Linezolid/gentamicin/Voriconazole 30/8/3%) as directed by [facility], then mesalt  Secondary dressing: Cover with Mextra superabsorbant pad, secure with medfix tape.   Frequency: Change daily  Offloading: continue offloading with specialty cushion/offloading mattress    Follow-up:1 week  [facility]: [facility]  Other Orders:  Discontinue [facility] vac, send back to [facility]    Continue to take Doxycycline and Levaquin as prescribed.        Follow up in about 1 week (around 7/24/2024) for md visit .

## 2024-07-17 NOTE — PATIENT INSTRUCTIONS
Cleanse wound with Vashe  Periwound care: prep periwound , allow to dry  Apply Santyl nickel thick only to necrotic tissue on Sacrum - then dress all as directed below.  Primary dressing: R buttock / Sacrum / L hip - apply topical antibiotic compound (Linezolid/gentamicin/Voriconazole 30/8/3%) as directed by [facility], then mesalt  Secondary dressing: Cover with Mextra superabsorbant pad, secure with medfix tape.   Frequency: Change daily  Offloading: continue offloading with specialty cushion/offloading mattress    Follow-up:1 week  [facility]: [facility]  Other Orders:  Discontinue [facility] vac, send back to [facility]    Continue to take Doxycycline and Levaquin as prescribed.

## 2024-07-22 NOTE — PATIENT INSTRUCTIONS
Cleanse wound with Vashe  Periwound care: prep periwound , allow to dry  Apply Santyl nickel thick only to necrotic tissue on Sacrum and to Right buttock- open area/undermining with necrotic tissue; then dress all as directed below.  Primary dressing: R buttock / Sacrum / L hip - apply topical antibiotic compound (Linezolid/gentamicin/Voriconazole 30/8/3%) as directed by [facility], then mesalt  Secondary dressing: Cover with Mextra superabsorbant pad, secure with medfix tape.   Frequency: Change daily  Offloading: continue offloading with specialty cushion/offloading mattress     Follow-up:1 week  [facility]: [facility]    Continue to take Doxycycline and Levaquin as prescribed.

## 2024-07-23 ENCOUNTER — HOSPITAL ENCOUNTER (OUTPATIENT)
Dept: WOUND CARE | Facility: HOSPITAL | Age: 59
Discharge: HOME | End: 2024-07-23
Attending: STUDENT IN AN ORGANIZED HEALTH CARE EDUCATION/TRAINING PROGRAM
Payer: MEDICARE

## 2024-07-23 VITALS
SYSTOLIC BLOOD PRESSURE: 137 MMHG | OXYGEN SATURATION: 97 % | HEART RATE: 130 BPM | RESPIRATION RATE: 20 BRPM | TEMPERATURE: 98 F | DIASTOLIC BLOOD PRESSURE: 83 MMHG

## 2024-07-23 DIAGNOSIS — L89.154 PRESSURE INJURY OF SACRAL REGION, STAGE 4: ICD-10-CM

## 2024-07-23 DIAGNOSIS — L89.154 SACRAL DECUBITUS ULCER, STAGE IV: ICD-10-CM

## 2024-07-23 DIAGNOSIS — L89.314 PRESSURE INJURY OF RIGHT ISCHIUM, STAGE 4: Primary | ICD-10-CM

## 2024-07-23 DIAGNOSIS — L89.224 PRESSURE INJURY OF LEFT HIP, STAGE 4: ICD-10-CM

## 2024-07-23 PROCEDURE — 99213 OFFICE O/P EST LOW 20 MIN: CPT | Mod: ,,, | Performed by: PEDIATRICS

## 2024-07-23 PROCEDURE — 27000999 HC MEDICAL RECORD PHOTO DOCUMENTATION

## 2024-07-23 PROCEDURE — 99213 OFFICE O/P EST LOW 20 MIN: CPT

## 2024-07-23 RX ORDER — LEVOFLOXACIN 750 MG/1
750 TABLET ORAL DAILY
Qty: 10 TABLET | Refills: 0 | Status: SHIPPED | OUTPATIENT
Start: 2024-07-23 | End: 2024-08-02

## 2024-07-23 RX ORDER — DOXYCYCLINE 100 MG/1
100 CAPSULE ORAL 2 TIMES DAILY
Qty: 20 CAPSULE | Refills: 0 | Status: SHIPPED | OUTPATIENT
Start: 2024-07-23 | End: 2024-08-02

## 2024-07-23 NOTE — PROGRESS NOTES
Subjective:       Patient ID: Antonio Galvan II is a 58 y.o. male.    Chief Complaint: Pressure Ulcer (Stg 4 pressure ulcers to R ischium / L hip and sacrum. Here for re-evaluation and treatment by MD )    HPI  Review of Systems      Objective:      Temp:  [98.1 °F (36.7 °C)]   Pulse:  [130]   Resp:  [20]   BP: (137)/(83)   SpO2:  [97 %]   Physical Exam       Altered Skin Integrity 01/12/23 1530 Sacral spine Full thickness tissue loss with exposed bone, tendon, or muscle. Often includes undermining and tunneling. May extend into muscle and/or supporting structures. (Active)   01/12/23 1530   Altered Skin Integrity Present on Admission - Did Patient arrive to the hospital with altered skin?: yes   Side:    Orientation:    Location: Sacral spine   Wound Number:    Is this injury device related?:    Primary Wound Type:    Description of Altered Skin Integrity: Full thickness tissue loss with exposed bone, tendon, or muscle. Often includes undermining and tunneling. May extend into muscle and/or supporting structures.   Ankle-Brachial Index:    Pulses:    Removal Indication and Assessment:    Wound Outcome:    (Retired) Wound Length (cm):    (Retired) Wound Width (cm):    (Retired) Depth (cm):    Wound Description (Comments):    Removal Indications:    Wound Image    07/23/24 1510   Dressing Appearance Intact;Moist drainage 07/23/24 1510   Drainage Amount Moderate 07/23/24 1510   Drainage Characteristics/Odor Serosanguineous 07/23/24 1510   Appearance Red;Granulating;Tan;Yellow;Slough;White;Fibrin;Bone;Maroon;Ecchymotic 07/23/24 1510   Tissue loss description Full thickness 07/23/24 1510   Red (%), Wound Tissue Color 40 % 07/23/24 1510   Yellow (%), Wound Tissue Color 40 % 07/23/24 1510   Periwound Area Scar tissue;Moist 07/23/24 1510   Wound Edges Defined 07/23/24 1510   Wound Length (cm) 7 cm 07/23/24 1510   Wound Width (cm) 5.5 cm 07/23/24 1510   Wound Depth (cm) 2.5 cm 07/23/24 1510   Wound Volume (cm^3) 96.25  cm^3 07/23/24 1510   Wound Surface Area (cm^2) 38.5 cm^2 07/23/24 1510   Care Cleansed with:;Antimicrobial agent;Wound cleanser 07/23/24 1510   Dressing Applied;Sodium chloride impregnated;Gauze;Absorptive Pad 07/23/24 1510   Periwound Care Skin barrier film applied 07/23/24 1510   Off Loading Other (see comments) 07/23/24 1510            Incision/Site 08/16/23 2011 Left Hip lateral (Active)   08/16/23 2011   Present Prior to Hospital Arrival?:    Side: Left   Location: Hip   Orientation: lateral   Incision Type:    Closure Method:    Additional Comments:    Removal Indication and Assessment:    Wound Outcome:    Removal Indications:    Wound Image   07/23/24 1510   Dressing Appearance Intact;Moist drainage 07/23/24 1510   Drainage Amount Moderate 07/23/24 1510   Drainage Characteristics/Odor [place];Serosanguineous 07/23/24 1510   Appearance Red;Pink;Granulating;Tan;Fibrin 07/23/24 1510   Red (%), Wound Tissue Color 90 % 07/23/24 1510   Periwound Area Moist;[place];Scar tissue 07/23/24 1510   Wound Edges Defined;Rolled/closed 07/23/24 1510   Wound Length (cm) 3.3 cm 07/23/24 1510   Wound Width (cm) 2.2 cm 07/23/24 1510   Wound Depth (cm) 1.5 cm 07/23/24 1510   Wound Volume (cm^3) 10.89 cm^3 07/23/24 1510   Wound Surface Area (cm^2) 7.26 cm^2 07/23/24 1510   Undermining (depth (cm)/location) 360 undermining / 2.3cm @ 3 o'clock 07/23/24 1510   Care Cleansed with:;Antimicrobial agent;Wound cleanser 07/23/24 1510   Dressing Applied;Sodium chloride impregnated;Gauze;Absorptive Pad 07/23/24 1510   Periwound Care Skin barrier film applied 07/23/24 1510            Incision/Site 02/27/24 1450 Right Buttocks lateral (Active)   02/27/24 1450   Present Prior to Hospital Arrival?:    Side: Right   Location: Buttocks   Orientation: lateral   Incision Type:    Closure Method:    Additional Comments: mesalt, quick clot, gauze, abdomen pad, and tape   Removal Indication and Assessment:    Wound Outcome:    Removal Indications:    Wound  Image   07/23/24 1510   Dressing Appearance Intact;Moist drainage 07/23/24 1510   Drainage Amount Moderate 07/23/24 1510   Drainage Characteristics/Odor Serosanguineous 07/23/24 1510   Appearance Red;Granulating;Maroon;Ecchymotic;Gray;Tan;Necrotic 07/23/24 1510   Red (%), Wound Tissue Color 100 % 07/23/24 1510   Periwound Area Intact;Scar tissue 07/23/24 1510   Wound Edges Defined 07/23/24 1510   Wound Length (cm) 6.2 cm 07/23/24 1510   Wound Width (cm) 11.5 cm 07/23/24 1510   Wound Depth (cm) 1.3 cm 07/23/24 1510   Wound Volume (cm^3) 92.69 cm^3 07/23/24 1510   Wound Surface Area (cm^2) 71.3 cm^2 07/23/24 1510   Undermining (depth (cm)/location) 1-3 o'clock; 4.0cm 07/23/24 1510   Care Cleansed with:;Antimicrobial agent;Wound cleanser 07/23/24 1510   Dressing Applied;Sodium chloride impregnated;Gauze;Absorptive Pad 07/23/24 1510   Periwound Care Skin barrier film applied 07/23/24 1510         Assessment:     Today sacral ulcer has some bruising.  There is some good tissue.  Also some fibrosis.  A small amount of bone is still present.  Right ischium also has some bruising and hematoma.  There is some tunneling and also some good granular tissue.  Left hip with red granulating tissue and some 10 fibrous tissue.  There is continued undermining of 2.3 cm.  All wounds were cleaned today.  Sacral wound had Santyl and Mesalt applied.  Also right ischial area with Santyl and Mesalt.  Left hip wound had Mesalt applied.  All wounds were bandaged.  Also topical antibiotics were used.  Patient continue on doxycycline and levofloxacin.  Dressings will be changed daily and patient will return in 1 week for MD visit.    ICD-10-CM ICD-9-CM   1. Pressure injury of right ischium, stage 4  L89.314 707.05     707.24   2. Sacral decubitus ulcer, stage IV  L89.154 707.03     707.24   3. Pressure injury of left hip, stage 4  L89.224 707.04     707.24   4. Pressure injury of sacral region, stage 4  L89.154 707.03     707.24         Plan:    Tissue pathology and/or culture taken:  [] Yes [x] No   Sharp debridement performed:   [] Yes [x] No   Labs ordered this visit:   [] Yes [x] No   Imaging ordered this visit:   [] Yes [x] No           Orders Placed This Encounter   Procedures    Change dressing     Cleanse wound with Vashe  Periwound care: prep periwound , allow to dry  Apply Santyl nickel thick only to necrotic tissue on Sacrum and to Right buttock- open area/undermining with necrotic tissue; then dress all as directed below.  Primary dressing: R buttock / Sacrum / L hip - apply topical antibiotic compound (Linezolid/gentamicin/Voriconazole 30/8/3%) as directed by [employer], then mesalt  Secondary dressing: Cover with Mextra superabsorbant pad, secure with medfix tape.   Frequency: Change daily  Offloading: continue offloading with specialty cushion/offloading mattress     Follow-up:1 week  Home Health: [facility]    Continue to take Doxycycline and Levaquin as prescribed.        Follow up in about 1 week (around 7/30/2024) for MD visit.

## 2024-07-25 NOTE — PATIENT INSTRUCTIONS
Cleanse wound with Vashe  Periwound care: prep periwound , allow to dry, apply hydrocolloid to irritated, denuded/excoriated periwound, may change every 3 days and prn dislodgement  Apply Santyl nickel thick only to necrotic tissue on Sacrum and to Right buttock- open area/undermining with necrotic tissue; then dress all as directed below.  Primary dressing: R buttock / Sacrum / L hip - apply topical antibiotic compound (Linezolid/gentamicin/Voriconazole 30/8/3%) as directed by [employer], then mesalt  Secondary dressing: Cover with Mextra superabsorbant pad, secure with medfix tape.   Frequency: Change daily  Offloading: continue offloading with specialty cushion/offloading mattress     Follow-up:1 week  Home [facility]: [facility]     Continue to take Doxycycline and Levaquin as prescribed.

## 2024-07-30 ENCOUNTER — HOSPITAL ENCOUNTER (OUTPATIENT)
Dept: WOUND CARE | Facility: HOSPITAL | Age: 59
Discharge: HOME | End: 2024-07-30
Attending: STUDENT IN AN ORGANIZED HEALTH CARE EDUCATION/TRAINING PROGRAM
Payer: MEDICARE

## 2024-07-30 VITALS
HEART RATE: 73 BPM | RESPIRATION RATE: 20 BRPM | DIASTOLIC BLOOD PRESSURE: 75 MMHG | TEMPERATURE: 99 F | SYSTOLIC BLOOD PRESSURE: 111 MMHG | OXYGEN SATURATION: 98 %

## 2024-07-30 DIAGNOSIS — L89.224 PRESSURE INJURY OF LEFT HIP, STAGE 4: ICD-10-CM

## 2024-07-30 DIAGNOSIS — L89.314 PRESSURE INJURY OF RIGHT ISCHIUM, STAGE 4: Primary | ICD-10-CM

## 2024-07-30 DIAGNOSIS — L89.154 SACRAL DECUBITUS ULCER, STAGE IV: ICD-10-CM

## 2024-07-30 PROCEDURE — 99213 OFFICE O/P EST LOW 20 MIN: CPT | Mod: 25,ICN,, | Performed by: PEDIATRICS

## 2024-07-30 PROCEDURE — 99213 OFFICE O/P EST LOW 20 MIN: CPT

## 2024-07-30 PROCEDURE — 27000999 HC MEDICAL RECORD PHOTO DOCUMENTATION

## 2024-07-30 PROCEDURE — 97597 DBRDMT OPN WND 1ST 20 CM/<: CPT | Mod: ,,, | Performed by: PEDIATRICS

## 2024-07-30 PROCEDURE — 97598 DBRDMT OPN WND ADDL 20CM/<: CPT | Mod: ,,, | Performed by: PEDIATRICS

## 2024-07-30 NOTE — PROGRESS NOTES
Subjective:       Patient ID: Antonio Galvan II is a 58 y.o. male.    Chief Complaint: Pressure Ulcer (Stg 4 pressure ulcers to R ischium / L hip and sacrum. Here for re-evaluation and treatment by MD )    HPI  Review of Systems      Objective:      Temp:  [99.3 °F (37.4 °C)]   Pulse:  [73]   Resp:  [20]   BP: (111)/(75)   SpO2:  [98 %]   Physical Exam       Altered Skin Integrity 01/12/23 1530 Sacral spine Full thickness tissue loss with exposed bone, tendon, or muscle. Often includes undermining and tunneling. May extend into muscle and/or supporting structures. (Active)   01/12/23 1530   Altered Skin Integrity Present on Admission - Did Patient arrive to the hospital with altered skin?: yes   Side:    Orientation:    Location: Sacral spine   Wound Number:    Is this injury device related?:    Primary Wound Type:    Description of Altered Skin Integrity: Full thickness tissue loss with exposed bone, tendon, or muscle. Often includes undermining and tunneling. May extend into muscle and/or supporting structures.   Ankle-Brachial Index:    Pulses:    Removal Indication and Assessment:    Wound Outcome:    (Retired) Wound Length (cm):    (Retired) Wound Width (cm):    (Retired) Depth (cm):    Wound Description (Comments):    Removal Indications:    Wound Image   07/30/24 1523   Dressing Appearance Intact;Moist drainage 07/30/24 1523   Drainage Amount Large 07/30/24 1523   Drainage Characteristics/Odor Serosanguineous 07/30/24 1523   Appearance Red;Granulating;Tan;Yellow;Slough;White;Fibrin;Bone;Maroon;Ecchymotic 07/30/24 1523   Tissue loss description Full thickness 07/30/24 1523   Red (%), Wound Tissue Color 65 % 07/30/24 1523   Yellow (%), Wound Tissue Color 35 % 07/30/24 1523   Periwound Area Scar tissue;Denuded;Excoriated;Macerated 07/30/24 1523   Wound Edges Defined 07/30/24 1523   Wound Length (cm) 7 cm 07/30/24 1523   Wound Width (cm) 5.5 cm 07/30/24 1523   Wound Depth (cm) 2.6 cm 07/30/24 1523   Wound  Volume (cm^3) 100.1 cm^3 07/30/24 1523   Wound Surface Area (cm^2) 38.5 cm^2 07/30/24 1523   Undermining (depth (cm)/location) 12 to 5 o'clock / 2.3cm @ 3 o'clock 07/30/24 1523   Care Cleansed with:;Antimicrobial agent;Wound cleanser 07/30/24 1523   Dressing Applied;Sodium chloride impregnated;Gauze;Absorptive Pad 07/30/24 1523   Periwound Care Skin barrier film applied;Hydrocolloid barrier applied 07/30/24 1523   Off Loading Other (see comments) 07/30/24 1523            Incision/Site 08/16/23 2011 Left Hip lateral (Active)   08/16/23 2011   Present Prior to Hospital Arrival?:    Side: Left   Location: Hip   Orientation: lateral   Incision Type:    Closure Method:    Additional Comments:    Removal Indication and Assessment:    Wound Outcome:    Removal Indications:    Dressing Appearance Intact;Moist drainage 07/30/24 1523   Drainage Amount Large 07/30/24 1523   Drainage Characteristics/Odor Serosanguineous 07/30/24 1523   Appearance Pink;Red;Granulating;Tan;Fibrin 07/30/24 1523   Red (%), Wound Tissue Color 90 % 07/30/24 1523   Periwound Area Moist;[place];Scar tissue 07/30/24 1523   Wound Edges Defined;Rolled/closed 07/30/24 1523   Wound Length (cm) 3 cm 07/30/24 1523   Wound Width (cm) 2 cm 07/30/24 1523   Wound Depth (cm) 3 cm 07/30/24 1523   Wound Volume (cm^3) 18 cm^3 07/30/24 1523   Wound Surface Area (cm^2) 6 cm^2 07/30/24 1523   Undermining (depth (cm)/location) 360 undermining / 2 cm @ 3 o'clock 07/30/24 1523   Care Cleansed with:;Antimicrobial agent;Wound cleanser 07/30/24 1523   Dressing Applied;Sodium chloride impregnated;Gauze;Absorptive Pad 07/30/24 1523   Periwound Care Skin barrier film applied 07/30/24 1523            Incision/Site 02/27/24 1450 Right Buttocks lateral (Active)   02/27/24 1450   Present Prior to Hospital Arrival?:    Side: Right   Location: Buttocks   Orientation: lateral   Incision Type:    Closure Method:    Additional Comments: mesalt, quick clot, gauze, abdomen pad, and tape    Removal Indication and Assessment:    Wound Outcome:    Removal Indications:    Wound Image   07/30/24 1523   Dressing Appearance Intact;Moist drainage 07/30/24 1523   Drainage Amount Large 07/30/24 1523   Drainage Characteristics/Odor Serosanguineous 07/30/24 1523   Appearance Red;Granulating;Gray;Tan;Necrotic;Ecchymotic 07/30/24 1523   Red (%), Wound Tissue Color 95 % 07/30/24 1523   Yellow (%), Wound Tissue Color 5 % 07/30/24 1523   Periwound Area Intact;Scar tissue;Denuded;Excoriated 07/30/24 1523   Wound Edges Defined 07/30/24 1523   Wound Length (cm) 6 cm 07/30/24 1523   Wound Width (cm) 11.5 cm 07/30/24 1523   Wound Depth (cm) 1 cm 07/30/24 1523   Wound Volume (cm^3) 69 cm^3 07/30/24 1523   Wound Surface Area (cm^2) 69 cm^2 07/30/24 1523   Undermining (depth (cm)/location) 1-3 o'clock  . 3.5cm @ 2 o'clock 07/30/24 1523   Care Cleansed with:;Antimicrobial agent;Wound cleanser 07/30/24 1523   Dressing Applied;Sodium chloride impregnated;Gauze;Absorptive Pad 07/30/24 1523   Periwound Care Skin barrier film applied;Hydrocolloid barrier applied 07/30/24 1523         Assessment:     Today sacral wound is 7 cm x 5.5 cm with a depth of 2.6 cm.  This has some red granulating tissue also yellow slough white fibrin and bone.  There is also some ecchymotic tissue.  A selective debridement was done.  See procedure note.  Santyl and Mesalt was applied to the wound.  Right buttocks has more granulating tissue.  There is some necrosis and ecchymotic areas.  These are minor.  An antimicrobial agent was applied and Mesalt.  Left hip is reddened granulating.  There is some fibrin.  Topical antibiotic and Mesalt was applied.  Dressings will be changed daily.  Patient will follow up in 1 week for MD visit.  Patient is to continue on antibiotics.    ICD-10-CM ICD-9-CM   1. Pressure injury of right ischium, stage 4  L89.314 707.05     707.24   2. Sacral decubitus ulcer, stage IV  L89.154 707.03     707.24   3. Pressure injury of  left hip, stage 4  L89.224 707.04     707.24         Plan:   Tissue pathology and/or culture taken:  [] Yes [x] No   Sharp debridement performed:   [] Yes [x] No   Labs ordered this visit:   [] Yes [x] No   Imaging ordered this visit:   [] Yes [x] No           Orders Placed This Encounter   Procedures    Change dressing     Cleanse wound with Vashe  Periwound care: prep periwound , allow to dry, apply hydrocolloid to irritated, denuded/excoriated periwound, may change every 3 days and prn dislodgement  Apply Santyl nickel thick only to necrotic tissue on Sacrum and to Right buttock- open area/undermining with necrotic tissue; then dress all as directed below.  Primary dressing: R buttock / Sacrum / L hip - apply topical antibiotic compound (Linezolid/gentamicin/Voriconazole 30/8/3%) as directed by [employer], then mesalt  Secondary dressing: Cover with Mextra superabsorbant pad, secure with medfix tape.   Frequency: Change daily  Offloading: continue offloading with specialty cushion/offloading mattress     Follow-up:1 week  Home [facility]: [facility]     Continue to take Doxycycline and Levaquin as prescribed.        Follow up in about 1 week (around 8/6/2024) for MD visit.

## 2024-07-30 NOTE — PROCEDURES
"Debridement    Date/Time: 7/30/2024 2:34 PM    Performed by: Ronald Barcenas MD  Authorized by: Ronald Barcenas MD  Associated wounds:        Altered Skin Integrity 01/12/23 1530 Sacral spine Full thickness tissue loss with exposed bone, tendon, or muscle. Often includes undermining and tunneling. May extend into muscle and/or supporting structures.  Time out: Immediately prior to procedure a "time out" was called to verify the correct patient, procedure, equipment, support staff and site/side marked as required.    Consent Done?:  Yes (Verbal) and Yes (Written)    Preparation: Patient was prepped and draped with clean technique    Local anesthesia used?: No      Wound Details:    Location:  Sacrum    Type of Debridement:  Non-excisional       Length (cm):  7       Area (sq cm):  38.5       Width (cm):  5.5       Percent Debrided (%):  100       Depth (cm):  2.6       Total Area Debrided (sq cm):  38.5    Depth of debridement:  Bone    Tissue debrided:  Adipose, Bone, Subcutaneous and Fascia    Devitalized tissue debrided:  Necrotic/Eschar, Slough and Biofilm    Instruments:  Scissors and Curette  Bleeding:  None  Patient tolerance:  Patient tolerated the procedure well with no immediate complications  "

## 2024-08-06 ENCOUNTER — HOSPITAL ENCOUNTER (OUTPATIENT)
Dept: WOUND CARE | Facility: HOSPITAL | Age: 59
Discharge: HOME | End: 2024-08-06
Attending: STUDENT IN AN ORGANIZED HEALTH CARE EDUCATION/TRAINING PROGRAM
Payer: MEDICARE

## 2024-08-06 VITALS
TEMPERATURE: 98 F | OXYGEN SATURATION: 99 % | RESPIRATION RATE: 20 BRPM | DIASTOLIC BLOOD PRESSURE: 69 MMHG | SYSTOLIC BLOOD PRESSURE: 102 MMHG | HEART RATE: 69 BPM

## 2024-08-06 DIAGNOSIS — L89.314 PRESSURE INJURY OF RIGHT ISCHIUM, STAGE 4: Primary | ICD-10-CM

## 2024-08-06 DIAGNOSIS — L89.224 PRESSURE INJURY OF LEFT HIP, STAGE 4: ICD-10-CM

## 2024-08-06 DIAGNOSIS — L89.154 SACRAL DECUBITUS ULCER, STAGE IV: ICD-10-CM

## 2024-08-06 PROCEDURE — 99213 OFFICE O/P EST LOW 20 MIN: CPT

## 2024-08-06 PROCEDURE — 99213 OFFICE O/P EST LOW 20 MIN: CPT | Mod: ,,, | Performed by: PEDIATRICS

## 2024-08-06 PROCEDURE — 27000999 HC MEDICAL RECORD PHOTO DOCUMENTATION

## 2024-08-13 ENCOUNTER — HOSPITAL ENCOUNTER (OUTPATIENT)
Dept: WOUND CARE | Facility: HOSPITAL | Age: 59
Discharge: HOME | End: 2024-08-13
Attending: STUDENT IN AN ORGANIZED HEALTH CARE EDUCATION/TRAINING PROGRAM
Payer: MEDICARE

## 2024-08-13 VITALS
SYSTOLIC BLOOD PRESSURE: 118 MMHG | HEART RATE: 75 BPM | OXYGEN SATURATION: 100 % | DIASTOLIC BLOOD PRESSURE: 81 MMHG | TEMPERATURE: 99 F | RESPIRATION RATE: 18 BRPM

## 2024-08-13 DIAGNOSIS — L89.154 SACRAL DECUBITUS ULCER, STAGE IV: ICD-10-CM

## 2024-08-13 DIAGNOSIS — L89.314 PRESSURE INJURY OF RIGHT ISCHIUM, STAGE 4: Primary | ICD-10-CM

## 2024-08-13 DIAGNOSIS — L89.224 PRESSURE INJURY OF LEFT HIP, STAGE 4: ICD-10-CM

## 2024-08-13 PROCEDURE — 99213 OFFICE O/P EST LOW 20 MIN: CPT

## 2024-08-13 PROCEDURE — 27000999 HC MEDICAL RECORD PHOTO DOCUMENTATION

## 2024-08-13 PROCEDURE — 99213 OFFICE O/P EST LOW 20 MIN: CPT | Mod: ,,, | Performed by: PEDIATRICS

## 2024-08-13 NOTE — PATIENT INSTRUCTIONS
Cleanse wound with Microsyn  Periwound care: prep periwound , allow to dry, apply hydrocolloid to irritated, denuded/excoriated periwound ON SACRUM AND R BUTTOCK  may change every 3 days and prn dislodgement  Apply Aquacel to split scarred area on L hip, cover with hydrocolloid, may change every 3 days and prn dislodgement/soilage     Primary dressings :Apply Santyl nickel thick only to necrotic tissue on Sacrum and to Right buttock- open area/undermining with necrotic tissue; then dress all as directed below.  Primary dressing: R buttock / Sacrum / L hip - apply topical antibiotic compound (Linezolid/gentamicin/Voriconazole 30/8/3%) as directed by [employer], then Aquacel  Secondary dressing: Cover with Mextra superabsorbant pad, secure with medfix tape.   Frequency: Change daily  Offloading: continue offloading with specialty cushion/offloading mattress     Follow-up:1 week  Home [facility]: [facility]

## 2024-08-13 NOTE — PROGRESS NOTES
Subjective:       Patient ID: Antonio Galvan II is a 58 y.o. male.    Chief Complaint: Pressure Ulcer (Stg 4 pressure ulcers to R ischium / L hip and sacrum. Here for re-evaluation and treatment by MD)    HPI  Review of Systems      Objective:      Temp:  [99 °F (37.2 °C)]   Pulse:  [75]   Resp:  [18]   BP: (118)/(81)   SpO2:  [100 %]   Physical Exam       Altered Skin Integrity 01/12/23 1530 Sacral spine Full thickness tissue loss with exposed bone, tendon, or muscle. Often includes undermining and tunneling. May extend into muscle and/or supporting structures. (Active)   01/12/23 1530   Altered Skin Integrity Present on Admission - Did Patient arrive to the hospital with altered skin?: yes   Side:    Orientation:    Location: Sacral spine   Wound Number:    Is this injury device related?:    Primary Wound Type:    Description of Altered Skin Integrity: Full thickness tissue loss with exposed bone, tendon, or muscle. Often includes undermining and tunneling. May extend into muscle and/or supporting structures.   Ankle-Brachial Index:    Pulses:    Removal Indication and Assessment:    Wound Outcome:    (Retired) Wound Length (cm):    (Retired) Wound Width (cm):    (Retired) Depth (cm):    Wound Description (Comments):    Removal Indications:    Wound Image   08/13/24 1456   Dressing Appearance Intact;Moist drainage 08/13/24 1456   Drainage Amount Large 08/13/24 1456   Drainage Characteristics/Odor Serosanguineous 08/13/24 1456   Appearance Red;Granulating;Tan;Yellow;Slough 08/13/24 1456   Tissue loss description Full thickness 08/13/24 1456   Red (%), Wound Tissue Color 70 % 08/13/24 1456   Yellow (%), Wound Tissue Color 30 % 08/13/24 1456   Periwound Area Denuded;Excoriated;Maroon;Dry;Scar tissue 08/13/24 1456   Wound Edges Defined 08/13/24 1456   Wound Length (cm) 7.1 cm 08/13/24 1456   Wound Width (cm) 4.3 cm 08/13/24 1456   Wound Depth (cm) 2 cm 08/13/24 1456   Wound Volume (cm^3) 61.06 cm^3 08/13/24 1456    Wound Surface Area (cm^2) 30.53 cm^2 08/13/24 1456   Undermining (depth (cm)/location) 12 to 4 o'clock and 9-11 o'clock; 2.5cm @2 o'clock 08/13/24 1456   Care Cleansed with:;Antimicrobial agent;Wound cleanser 08/13/24 1456   Dressing Applied;Hydrofiber;Absorptive Pad 08/13/24 1456   Periwound Care Skin barrier film applied;Hydrocolloid barrier applied 08/13/24 1456   Off Loading Other (see comments) 08/13/24 1456            Incision/Site 08/16/23 2011 Left Hip lateral (Active)   08/16/23 2011   Present Prior to Hospital Arrival?:    Side: Left   Location: Hip   Orientation: lateral   Incision Type:    Closure Method:    Additional Comments:    Removal Indication and Assessment:    Wound Outcome:    Removal Indications:    Wound Image    08/13/24 1456   Dressing Appearance Moist drainage;Intact 08/13/24 1456   Drainage Amount Large 08/13/24 1456   Drainage Characteristics/Odor Serosanguineous 08/13/24 1456   Appearance Pink;Red;Granulating 08/13/24 1456   Red (%), Wound Tissue Color 90 % 08/13/24 1456   Periwound Area Pink;Moist;Excoriated;Scar tissue;Denuded 08/13/24 1456   Wound Edges Defined;Rolled/closed 08/13/24 1456   Wound Length (cm) 3.5 cm 08/13/24 1456   Wound Width (cm) 2.4 cm 08/13/24 1456   Wound Depth (cm) 2 cm 08/13/24 1456   Wound Volume (cm^3) 16.8 cm^3 08/13/24 1456   Wound Surface Area (cm^2) 8.4 cm^2 08/13/24 1456   Undermining (depth (cm)/location) 360 undermining; 2.5cm at 3 o'clock 08/13/24 1456   Care Cleansed with:;Antimicrobial agent 08/13/24 1456   Dressing Applied;Calcium alginate;Gauze;Absorptive Pad 08/13/24 1456   Periwound Care Skin barrier film applied 08/13/24 1456            Incision/Site 02/27/24 1450 Right Buttocks lateral (Active)   02/27/24 1450   Present Prior to Hospital Arrival?:    Side: Right   Location: Buttocks   Orientation: lateral   Incision Type:    Closure Method:    Additional Comments: mesalt, quick clot, gauze, abdomen pad, and tape   Removal Indication and  Assessment:    Wound Outcome:    Removal Indications:    Wound Image   08/13/24 1456   Dressing Appearance Intact;Moist drainage 08/13/24 1456   Drainage Amount Large 08/13/24 1456   Drainage Characteristics/Odor Serosanguineous 08/13/24 1456   Appearance Red;Granulating;Tan;Necrotic 08/13/24 1456   Red (%), Wound Tissue Color 95 % 08/13/24 1456   Periwound Area Denuded;Excoriated;Scar tissue 08/13/24 1456   Wound Edges Defined 08/13/24 1456   Wound Length (cm) 8.4 cm 08/13/24 1456   Wound Width (cm) 13 cm 08/13/24 1456   Wound Depth (cm) 0.7 cm 08/13/24 1456   Wound Volume (cm^3) 76.44 cm^3 08/13/24 1456   Wound Surface Area (cm^2) 109.2 cm^2 08/13/24 1456   Undermining (depth (cm)/location) 1 to 3 o'clock; 3.6cm @2 o'clock 08/13/24 1456   Care Cleansed with:;Antimicrobial agent;Wound cleanser 08/13/24 1456   Dressing Applied;Hydrofiber;Absorptive Pad 08/13/24 1456   Periwound Care Skin barrier film applied;Hydrocolloid barrier applied 08/13/24 1456         Assessment:     Today sacral wound is reddened granulating with some yellow slough.  Surrounding area has become denuded with some excoriation.  Wound 7.1 cm x 4.3 cm with a depth of 2 cm.  Antimicrobial powder was applied and hydrocolloid applied to the surrounding area.  Aquacel also applied and covered with hydrocolloid.  Santyl was applied to the necrotic tissue.  Right buttocks wound is reddened granulating with some necrotic tissue and bruising.  Periwound area has become denuded excoriated.  Was 8.4 cm x 13 cm with a depth of 0.7 cm.  Hydrocolloid was applied to the periwound area along with Aquacel and Santyl.  Also antimicrobial powder was used.  Left hip is reddened granulating.  There is some excoriated denuded areas but this is smaller than the other wounds.  Size is 3.5 cm x 2 cm with a depth of 2 cm.  This was treated as the above wounds.  Dressings will be changed daily and patient will return in 1 week for MD visit.    ICD-10-CM ICD-9-CM   1.  Pressure injury of right ischium, stage 4  L89.314 707.05     707.24   2. Sacral decubitus ulcer, stage IV  L89.154 707.03     707.24   3. Pressure injury of left hip, stage 4  L89.224 707.04     707.24         Plan:   Tissue pathology and/or culture taken:  [] Yes [x] No   Sharp debridement performed:   [] Yes [x] No   Labs ordered this visit:   [] Yes [x] No   Imaging ordered this visit:   [] Yes [x] No           Orders Placed This Encounter   Procedures    Change dressing     Cleanse wound with Microsyn  Periwound care: prep periwound , allow to dry, apply hydrocolloid to irritated, denuded/excoriated periwound ON SACRUM AND R BUTTOCK  may change every 3 days and prn dislodgement  Apply Aquacel to split scarred area on L hip, cover with hydrocolloid, may change every 3 days and prn dislodgement/soilage     Primary dressings :Apply Santyl nickel thick only to necrotic tissue on Sacrum and to Right buttock- open area/undermining with necrotic tissue; then dress all as directed below.  Primary dressing: R buttock / Sacrum / L hip - apply topical antibiotic compound (Linezolid/gentamicin/Voriconazole 30/8/3%) as directed by [employer], then Aquacel  Secondary dressing: Cover with Mextra superabsorbant pad, secure with medfix tape.   Frequency: Change daily  Offloading: continue offloading with specialty cushion/offloading mattress     Follow-up:1 week  Home [facility]: [facility]        Follow up in about 1 week (around 8/20/2024).

## 2024-08-16 ENCOUNTER — LAB REQUISITION (OUTPATIENT)
Dept: LAB | Facility: HOSPITAL | Age: 59
End: 2024-08-16
Payer: MEDICARE

## 2024-08-16 DIAGNOSIS — M86.60 OTHER CHRONIC OSTEOMYELITIS, UNSPECIFIED SITE: ICD-10-CM

## 2024-08-16 DIAGNOSIS — L89.154 PRESSURE ULCER OF SACRAL REGION, STAGE 4: ICD-10-CM

## 2024-08-16 DIAGNOSIS — E11.69 TYPE 2 DIABETES MELLITUS WITH OTHER SPECIFIED COMPLICATION: ICD-10-CM

## 2024-08-16 LAB
ALBUMIN SERPL-MCNC: 3.1 G/DL (ref 3.5–5)
ALBUMIN/GLOB SERPL: 0.8 RATIO (ref 1.1–2)
ALP SERPL-CCNC: 101 UNIT/L (ref 40–150)
ALT SERPL-CCNC: 21 UNIT/L (ref 0–55)
ANION GAP SERPL CALC-SCNC: 9 MEQ/L
AST SERPL-CCNC: 18 UNIT/L (ref 5–34)
BASOPHILS # BLD AUTO: 0.03 X10(3)/MCL
BASOPHILS NFR BLD AUTO: 0.4 %
BILIRUB SERPL-MCNC: 0.4 MG/DL
BUN SERPL-MCNC: 41.9 MG/DL (ref 8.4–25.7)
CALCIUM SERPL-MCNC: 8.7 MG/DL (ref 8.4–10.2)
CHLORIDE SERPL-SCNC: 108 MMOL/L (ref 98–107)
CO2 SERPL-SCNC: 21 MMOL/L (ref 22–29)
CREAT SERPL-MCNC: 1.82 MG/DL (ref 0.73–1.18)
CREAT/UREA NIT SERPL: 23
CRP SERPL-MCNC: 61.8 MG/L
EOSINOPHIL # BLD AUTO: 0.29 X10(3)/MCL (ref 0–0.9)
EOSINOPHIL NFR BLD AUTO: 3.5 %
ERYTHROCYTE [DISTWIDTH] IN BLOOD BY AUTOMATED COUNT: 15.5 % (ref 11.5–17)
ERYTHROCYTE [SEDIMENTATION RATE] IN BLOOD: 55 MM/HR (ref 0–15)
EST. AVERAGE GLUCOSE BLD GHB EST-MCNC: 168.6 MG/DL
GFR SERPLBLD CREATININE-BSD FMLA CKD-EPI: 43 ML/MIN/1.73/M2
GLOBULIN SER-MCNC: 4 GM/DL (ref 2.4–3.5)
GLUCOSE SERPL-MCNC: 131 MG/DL (ref 74–100)
HBA1C MFR BLD: 7.5 %
HCT VFR BLD AUTO: 37.8 % (ref 42–52)
HGB BLD-MCNC: 12.2 G/DL (ref 14–18)
IMM GRANULOCYTES # BLD AUTO: 0.02 X10(3)/MCL (ref 0–0.04)
IMM GRANULOCYTES NFR BLD AUTO: 0.2 %
LYMPHOCYTES # BLD AUTO: 2.51 X10(3)/MCL (ref 0.6–4.6)
LYMPHOCYTES NFR BLD AUTO: 30.5 %
MCH RBC QN AUTO: 27.9 PG (ref 27–31)
MCHC RBC AUTO-ENTMCNC: 32.3 G/DL (ref 33–36)
MCV RBC AUTO: 86.5 FL (ref 80–94)
MONOCYTES # BLD AUTO: 0.6 X10(3)/MCL (ref 0.1–1.3)
MONOCYTES NFR BLD AUTO: 7.3 %
NEUTROPHILS # BLD AUTO: 4.78 X10(3)/MCL (ref 2.1–9.2)
NEUTROPHILS NFR BLD AUTO: 58.1 %
PLATELET # BLD AUTO: 162 X10(3)/MCL (ref 130–400)
PMV BLD AUTO: 10.2 FL (ref 7.4–10.4)
POTASSIUM SERPL-SCNC: 5.1 MMOL/L (ref 3.5–5.1)
PREALB SERPL-MCNC: 22.7 MG/DL (ref 18–45)
PROT SERPL-MCNC: 7.1 GM/DL (ref 6.4–8.3)
RBC # BLD AUTO: 4.37 X10(6)/MCL (ref 4.7–6.1)
SODIUM SERPL-SCNC: 138 MMOL/L (ref 136–145)
WBC # BLD AUTO: 8.23 X10(3)/MCL (ref 4.5–11.5)

## 2024-08-16 PROCEDURE — 85025 COMPLETE CBC W/AUTO DIFF WBC: CPT | Performed by: NURSE PRACTITIONER

## 2024-08-16 PROCEDURE — 86140 C-REACTIVE PROTEIN: CPT | Performed by: NURSE PRACTITIONER

## 2024-08-16 PROCEDURE — 85652 RBC SED RATE AUTOMATED: CPT | Performed by: NURSE PRACTITIONER

## 2024-08-16 PROCEDURE — 80053 COMPREHEN METABOLIC PANEL: CPT | Performed by: NURSE PRACTITIONER

## 2024-08-16 PROCEDURE — 83036 HEMOGLOBIN GLYCOSYLATED A1C: CPT | Performed by: NURSE PRACTITIONER

## 2024-08-16 PROCEDURE — 84134 ASSAY OF PREALBUMIN: CPT | Performed by: NURSE PRACTITIONER

## 2024-10-08 ENCOUNTER — HOSPITAL ENCOUNTER (INPATIENT)
Facility: HOSPITAL | Age: 59
LOS: 7 days | Discharge: HOME HEALTH | DRG: 592 | End: 2024-10-15
Attending: STUDENT IN AN ORGANIZED HEALTH CARE EDUCATION/TRAINING PROGRAM | Admitting: INTERNAL MEDICINE
Payer: MEDICARE

## 2024-10-08 DIAGNOSIS — R07.9 CHEST PAIN: ICD-10-CM

## 2024-10-08 DIAGNOSIS — L89.314 PRESSURE INJURY OF RIGHT ISCHIUM, STAGE 4: Primary | ICD-10-CM

## 2024-10-08 DIAGNOSIS — L89.159 SACRAL DECUBITUS ULCER: ICD-10-CM

## 2024-10-08 LAB
ALBUMIN SERPL-MCNC: 3.1 G/DL (ref 3.5–5)
ALBUMIN/GLOB SERPL: 0.5 RATIO (ref 1.1–2)
ALP SERPL-CCNC: 109 UNIT/L (ref 40–150)
ALT SERPL-CCNC: 18 UNIT/L (ref 0–55)
ANION GAP SERPL CALC-SCNC: 9 MEQ/L
AST SERPL-CCNC: 13 UNIT/L (ref 5–34)
BACTERIA #/AREA URNS AUTO: ABNORMAL /HPF
BASOPHILS # BLD AUTO: 0.06 X10(3)/MCL
BASOPHILS NFR BLD AUTO: 0.6 %
BILIRUB SERPL-MCNC: 0.3 MG/DL
BILIRUB UR QL STRIP.AUTO: NEGATIVE
BUN SERPL-MCNC: 34.1 MG/DL (ref 8.4–25.7)
CALCIUM SERPL-MCNC: 9.5 MG/DL (ref 8.4–10.2)
CHLORIDE SERPL-SCNC: 107 MMOL/L (ref 98–107)
CLARITY UR: CLEAR
CO2 SERPL-SCNC: 19 MMOL/L (ref 22–29)
COLOR UR AUTO: ABNORMAL
CREAT SERPL-MCNC: 2.08 MG/DL (ref 0.73–1.18)
CREAT/UREA NIT SERPL: 16
CRP SERPL-MCNC: 121.1 MG/L
EOSINOPHIL # BLD AUTO: 0.14 X10(3)/MCL (ref 0–0.9)
EOSINOPHIL NFR BLD AUTO: 1.3 %
ERYTHROCYTE [DISTWIDTH] IN BLOOD BY AUTOMATED COUNT: 15.5 % (ref 11.5–17)
ERYTHROCYTE [SEDIMENTATION RATE] IN BLOOD: 86 MM/HR (ref 0–15)
GFR SERPLBLD CREATININE-BSD FMLA CKD-EPI: 37 ML/MIN/1.73/M2
GLOBULIN SER-MCNC: 5.8 GM/DL (ref 2.4–3.5)
GLUCOSE SERPL-MCNC: 240 MG/DL (ref 74–100)
GLUCOSE UR QL STRIP: NORMAL
HCT VFR BLD AUTO: 35.4 % (ref 42–52)
HGB BLD-MCNC: 11.2 G/DL (ref 14–18)
HGB UR QL STRIP: NEGATIVE
IMM GRANULOCYTES # BLD AUTO: 0.07 X10(3)/MCL (ref 0–0.04)
IMM GRANULOCYTES NFR BLD AUTO: 0.7 %
KETONES UR QL STRIP: NEGATIVE
LACTATE SERPL-SCNC: 1.4 MMOL/L (ref 0.5–2.2)
LEUKOCYTE ESTERASE UR QL STRIP: 500
LYMPHOCYTES # BLD AUTO: 2.09 X10(3)/MCL (ref 0.6–4.6)
LYMPHOCYTES NFR BLD AUTO: 19.9 %
MCH RBC QN AUTO: 27.5 PG (ref 27–31)
MCHC RBC AUTO-ENTMCNC: 31.6 G/DL (ref 33–36)
MCV RBC AUTO: 87 FL (ref 80–94)
MONOCYTES # BLD AUTO: 0.45 X10(3)/MCL (ref 0.1–1.3)
MONOCYTES NFR BLD AUTO: 4.3 %
MUCOUS THREADS URNS QL MICRO: ABNORMAL /LPF
NEUTROPHILS # BLD AUTO: 7.68 X10(3)/MCL (ref 2.1–9.2)
NEUTROPHILS NFR BLD AUTO: 73.2 %
NITRITE UR QL STRIP: NEGATIVE
NRBC BLD AUTO-RTO: 0 %
PH UR STRIP: 8 [PH]
PLATELET # BLD AUTO: 352 X10(3)/MCL (ref 130–400)
PMV BLD AUTO: 10.7 FL (ref 7.4–10.4)
POTASSIUM SERPL-SCNC: 5.3 MMOL/L (ref 3.5–5.1)
PROT SERPL-MCNC: 8.9 GM/DL (ref 6.4–8.3)
PROT UR QL STRIP: ABNORMAL
RBC # BLD AUTO: 4.07 X10(6)/MCL (ref 4.7–6.1)
RBC #/AREA URNS AUTO: ABNORMAL /HPF
SODIUM SERPL-SCNC: 135 MMOL/L (ref 136–145)
SP GR UR STRIP.AUTO: 1.01 (ref 1–1.03)
SQUAMOUS #/AREA URNS LPF: ABNORMAL /HPF
TRI-PHOS CRY UR QL COMP ASSIST: ABNORMAL
UROBILINOGEN UR STRIP-ACNC: NORMAL
WBC # BLD AUTO: 10.49 X10(3)/MCL (ref 4.5–11.5)
WBC #/AREA URNS AUTO: ABNORMAL /HPF

## 2024-10-08 PROCEDURE — 63600175 PHARM REV CODE 636 W HCPCS: Performed by: NURSE PRACTITIONER

## 2024-10-08 PROCEDURE — 63600175 PHARM REV CODE 636 W HCPCS: Performed by: STUDENT IN AN ORGANIZED HEALTH CARE EDUCATION/TRAINING PROGRAM

## 2024-10-08 PROCEDURE — 96365 THER/PROPH/DIAG IV INF INIT: CPT

## 2024-10-08 PROCEDURE — 86140 C-REACTIVE PROTEIN: CPT | Performed by: STUDENT IN AN ORGANIZED HEALTH CARE EDUCATION/TRAINING PROGRAM

## 2024-10-08 PROCEDURE — 96361 HYDRATE IV INFUSION ADD-ON: CPT

## 2024-10-08 PROCEDURE — 21400001 HC TELEMETRY ROOM

## 2024-10-08 PROCEDURE — 25000003 PHARM REV CODE 250: Performed by: STUDENT IN AN ORGANIZED HEALTH CARE EDUCATION/TRAINING PROGRAM

## 2024-10-08 PROCEDURE — 99285 EMERGENCY DEPT VISIT HI MDM: CPT | Mod: 25

## 2024-10-08 PROCEDURE — 87086 URINE CULTURE/COLONY COUNT: CPT | Performed by: PHYSICIAN ASSISTANT

## 2024-10-08 PROCEDURE — 80053 COMPREHEN METABOLIC PANEL: CPT | Performed by: PHYSICIAN ASSISTANT

## 2024-10-08 PROCEDURE — 81001 URINALYSIS AUTO W/SCOPE: CPT | Performed by: PHYSICIAN ASSISTANT

## 2024-10-08 PROCEDURE — 99222 1ST HOSP IP/OBS MODERATE 55: CPT | Mod: GC,,, | Performed by: SURGERY

## 2024-10-08 PROCEDURE — 11000001 HC ACUTE MED/SURG PRIVATE ROOM

## 2024-10-08 PROCEDURE — 85652 RBC SED RATE AUTOMATED: CPT | Performed by: STUDENT IN AN ORGANIZED HEALTH CARE EDUCATION/TRAINING PROGRAM

## 2024-10-08 PROCEDURE — 83605 ASSAY OF LACTIC ACID: CPT | Performed by: PHYSICIAN ASSISTANT

## 2024-10-08 PROCEDURE — 63600175 PHARM REV CODE 636 W HCPCS: Performed by: INTERNAL MEDICINE

## 2024-10-08 PROCEDURE — 25000003 PHARM REV CODE 250: Performed by: INTERNAL MEDICINE

## 2024-10-08 PROCEDURE — 63600175 PHARM REV CODE 636 W HCPCS: Performed by: PHYSICIAN ASSISTANT

## 2024-10-08 PROCEDURE — 85025 COMPLETE CBC W/AUTO DIFF WBC: CPT | Performed by: PHYSICIAN ASSISTANT

## 2024-10-08 PROCEDURE — 87040 BLOOD CULTURE FOR BACTERIA: CPT | Performed by: PHYSICIAN ASSISTANT

## 2024-10-08 RX ORDER — IBUPROFEN 200 MG
24 TABLET ORAL
Status: DISCONTINUED | OUTPATIENT
Start: 2024-10-08 | End: 2024-10-15 | Stop reason: HOSPADM

## 2024-10-08 RX ORDER — GLUCAGON 1 MG
1 KIT INJECTION
Status: DISCONTINUED | OUTPATIENT
Start: 2024-10-08 | End: 2024-10-15 | Stop reason: HOSPADM

## 2024-10-08 RX ORDER — IBUPROFEN 200 MG
16 TABLET ORAL
Status: DISCONTINUED | OUTPATIENT
Start: 2024-10-08 | End: 2024-10-08

## 2024-10-08 RX ORDER — ACETAMINOPHEN 500 MG
1000 TABLET ORAL EVERY 6 HOURS PRN
Status: DISCONTINUED | OUTPATIENT
Start: 2024-10-08 | End: 2024-10-15 | Stop reason: HOSPADM

## 2024-10-08 RX ORDER — PROCHLORPERAZINE EDISYLATE 5 MG/ML
5 INJECTION INTRAMUSCULAR; INTRAVENOUS EVERY 6 HOURS PRN
Status: DISCONTINUED | OUTPATIENT
Start: 2024-10-08 | End: 2024-10-15 | Stop reason: HOSPADM

## 2024-10-08 RX ORDER — HYDROCODONE BITARTRATE AND ACETAMINOPHEN 5; 325 MG/1; MG/1
1 TABLET ORAL EVERY 6 HOURS PRN
Status: DISCONTINUED | OUTPATIENT
Start: 2024-10-08 | End: 2024-10-15 | Stop reason: HOSPADM

## 2024-10-08 RX ORDER — ONDANSETRON HYDROCHLORIDE 2 MG/ML
4 INJECTION, SOLUTION INTRAVENOUS EVERY 4 HOURS PRN
Status: DISCONTINUED | OUTPATIENT
Start: 2024-10-08 | End: 2024-10-15 | Stop reason: HOSPADM

## 2024-10-08 RX ORDER — IBUPROFEN 200 MG
24 TABLET ORAL
Status: DISCONTINUED | OUTPATIENT
Start: 2024-10-08 | End: 2024-10-08

## 2024-10-08 RX ORDER — SODIUM CHLORIDE 0.9 % (FLUSH) 0.9 %
10 SYRINGE (ML) INJECTION
Status: DISCONTINUED | OUTPATIENT
Start: 2024-10-08 | End: 2024-10-15 | Stop reason: HOSPADM

## 2024-10-08 RX ORDER — NALOXONE HCL 0.4 MG/ML
0.02 VIAL (ML) INJECTION
Status: DISCONTINUED | OUTPATIENT
Start: 2024-10-08 | End: 2024-10-15 | Stop reason: HOSPADM

## 2024-10-08 RX ORDER — SODIUM CHLORIDE, SODIUM LACTATE, POTASSIUM CHLORIDE, CALCIUM CHLORIDE 600; 310; 30; 20 MG/100ML; MG/100ML; MG/100ML; MG/100ML
INJECTION, SOLUTION INTRAVENOUS CONTINUOUS
Status: DISCONTINUED | OUTPATIENT
Start: 2024-10-08 | End: 2024-10-08

## 2024-10-08 RX ORDER — ACETAMINOPHEN 325 MG/1
650 TABLET ORAL EVERY 4 HOURS PRN
Status: DISCONTINUED | OUTPATIENT
Start: 2024-10-08 | End: 2024-10-15 | Stop reason: HOSPADM

## 2024-10-08 RX ORDER — ENOXAPARIN SODIUM 100 MG/ML
40 INJECTION SUBCUTANEOUS EVERY 24 HOURS
Status: DISCONTINUED | OUTPATIENT
Start: 2024-10-08 | End: 2024-10-15 | Stop reason: HOSPADM

## 2024-10-08 RX ORDER — IBUPROFEN 200 MG
16 TABLET ORAL
Status: DISCONTINUED | OUTPATIENT
Start: 2024-10-08 | End: 2024-10-15 | Stop reason: HOSPADM

## 2024-10-08 RX ORDER — DOXYCYCLINE HYCLATE 100 MG
100 TABLET ORAL EVERY 12 HOURS
Status: DISCONTINUED | OUTPATIENT
Start: 2024-10-08 | End: 2024-10-11

## 2024-10-08 RX ORDER — MUPIROCIN 20 MG/G
OINTMENT TOPICAL 2 TIMES DAILY
Status: DISPENSED | OUTPATIENT
Start: 2024-10-08 | End: 2024-10-13

## 2024-10-08 RX ORDER — INSULIN ASPART 100 [IU]/ML
0-10 INJECTION, SOLUTION INTRAVENOUS; SUBCUTANEOUS
Status: DISCONTINUED | OUTPATIENT
Start: 2024-10-08 | End: 2024-10-15 | Stop reason: HOSPADM

## 2024-10-08 RX ORDER — INSULIN ASPART 100 [IU]/ML
0-5 INJECTION, SOLUTION INTRAVENOUS; SUBCUTANEOUS
Status: DISCONTINUED | OUTPATIENT
Start: 2024-10-08 | End: 2024-10-08

## 2024-10-08 RX ORDER — LANOLIN ALCOHOL/MO/W.PET/CERES
1 CREAM (GRAM) TOPICAL DAILY
Status: DISCONTINUED | OUTPATIENT
Start: 2024-10-09 | End: 2024-10-15 | Stop reason: HOSPADM

## 2024-10-08 RX ORDER — GLUCAGON 1 MG
1 KIT INJECTION
Status: DISCONTINUED | OUTPATIENT
Start: 2024-10-08 | End: 2024-10-08

## 2024-10-08 RX ORDER — SODIUM CHLORIDE, SODIUM LACTATE, POTASSIUM CHLORIDE, CALCIUM CHLORIDE 600; 310; 30; 20 MG/100ML; MG/100ML; MG/100ML; MG/100ML
INJECTION, SOLUTION INTRAVENOUS CONTINUOUS
Status: ACTIVE | OUTPATIENT
Start: 2024-10-08 | End: 2024-10-09

## 2024-10-08 RX ORDER — ASCORBIC ACID 250 MG
500 TABLET ORAL DAILY
Status: DISCONTINUED | OUTPATIENT
Start: 2024-10-09 | End: 2024-10-15 | Stop reason: HOSPADM

## 2024-10-08 RX ADMIN — SODIUM CHLORIDE, POTASSIUM CHLORIDE, SODIUM LACTATE AND CALCIUM CHLORIDE 1000 ML: 600; 310; 30; 20 INJECTION, SOLUTION INTRAVENOUS at 12:10

## 2024-10-08 RX ADMIN — SODIUM CHLORIDE, POTASSIUM CHLORIDE, SODIUM LACTATE AND CALCIUM CHLORIDE 1000 ML: 600; 310; 30; 20 INJECTION, SOLUTION INTRAVENOUS at 02:10

## 2024-10-08 RX ADMIN — PIPERACILLIN AND TAZOBACTAM 4.5 G: 4; .5 INJECTION, POWDER, LYOPHILIZED, FOR SOLUTION INTRAVENOUS; PARENTERAL at 03:10

## 2024-10-08 RX ADMIN — DOXYCYCLINE HYCLATE 100 MG: 100 TABLET, COATED ORAL at 08:10

## 2024-10-08 RX ADMIN — SODIUM CHLORIDE, POTASSIUM CHLORIDE, SODIUM LACTATE AND CALCIUM CHLORIDE: 600; 310; 30; 20 INJECTION, SOLUTION INTRAVENOUS at 06:10

## 2024-10-08 RX ADMIN — MUPIROCIN: 20 OINTMENT TOPICAL at 08:10

## 2024-10-08 RX ADMIN — ENOXAPARIN SODIUM 40 MG: 40 INJECTION SUBCUTANEOUS at 08:10

## 2024-10-08 RX ADMIN — INSULIN ASPART 3 UNITS: 100 INJECTION, SOLUTION INTRAVENOUS; SUBCUTANEOUS at 08:10

## 2024-10-08 NOTE — FIRST PROVIDER EVALUATION
"Medical screening examination initiated.  I have conducted a focused provider triage encounter, findings are as follows:    Brief history of present illness:  58-year-old male presents to ED for evaluation sacral wound infection.  Patient reports that he was being seen by wound care and was sent by wound care doctor for IV antibiotics and possible debridement. Currently on Bactrim    Vitals:    10/08/24 1152   Pulse: 75   Resp: 20   Temp: 98.7 °F (37.1 °C)   TempSrc: Oral   SpO2: 98%   Weight: 104.3 kg (230 lb)   Height: 5' 8" (1.727 m)       Pertinent physical exam:  Patient awake sitting in wheelchair.     Brief workup plan:  labs, IV    Preliminary workup initiated; this workup will be continued and followed by the physician or advanced practice provider that is assigned to the patient when roomed.  "
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
## 2024-10-08 NOTE — ED PROVIDER NOTES
Encounter Date: 10/8/2024    SCRIBE #1 NOTE: I, Kris Craven, am scribing for, and in the presence of,  Harry Mccoy MD. I have scribed the entire note.       History     Chief Complaint   Patient presents with    Wound Infection     Arrives POV, sent by Dr Huff for IV abx and debridment for a wound infection to sacrum.      58 year old male with a hx of A fib, DM, and quadriplegia presents to the ED for wound infection. Pt was sent here by Dr. Huff for IV abx and debridement for a wound infection to the sacrum. Pt denies any current pain. Pt says he has no sensation from the neck down.     The history is provided by the patient. No  was used.     Review of patient's allergies indicates:   Allergen Reactions    Metoprolol Other (See Comments)     Pt refuses, states his arrest occurred after receiving this medication     Past Medical History:   Diagnosis Date    A-fib     Cardiac arrest     7/2022    Chronic skin ulcer     DM (diabetes mellitus)     Infected decubitus ulcer     Lymphedema of leg     Neurogenic bladder     Obesity, unspecified     Pressure ulcer of heel     Pressure ulcer of right foot, stage 3     Pressure ulcer of right ischium     Quadriplegia     Quadriplegic spinal paralysis      Past Surgical History:   Procedure Laterality Date    APPLICATION OF WOUND VACUUM-ASSISTED CLOSURE DEVICE Right 5/12/2023    Procedure: APPLICATION, WOUND VAC;  Surgeon: Keyonna Jean MD;  Location: [facility] OR;  Service: General;  Laterality: Right;    DEBRIDEMENT OF BUTTOCKS Right 5/12/2023    Procedure: DEBRIDEMENT, BUTTOCK;  Surgeon: Keyonna Jean MD;  Location: [facility] OR;  Service: General;  Laterality: Right;    INCISION AND DRAINAGE HIP Bilateral 8/16/2023    Procedure: INCISION AND DRAINAGE, HIP;  Surgeon: Ryan Tirado MD;  Location: [facility] OR;  Service: General;  Laterality: Bilateral;  1. Excisional debridement skin to bone, skin to bone with biopsy and debridement of hard  bone at the Left hip necrotic wound 11 cm long 5-1/2 cm wide 4-1/2 cm deep upon completion of debridement   2. Excisional debridement skin to muscle right hip with debridement    PRESSURE ULCER DEBRIDEMENT N/A 2/27/2024    Procedure: DEBRIDEMENT, PRESSURE ULCER;  Surgeon: Keyonna Jean MD;  Location: [facility] OR;  Service: General;  Laterality: N/A;     No family history on file.  Social History     Tobacco Use    Smoking status: Never    Smokeless tobacco: Never   Substance Use Topics    Alcohol use: Never    Drug use: Never     Review of Systems   Constitutional:  Negative for chills and fever.   HENT:  Negative for drooling and sore throat.    Eyes:  Negative for pain and redness.   Respiratory:  Negative for shortness of breath, wheezing and stridor.    Cardiovascular:  Negative for chest pain, palpitations and leg swelling.   Gastrointestinal:  Negative for abdominal pain, nausea and vomiting.   Genitourinary:  Negative for dysuria and hematuria.   Musculoskeletal:  Negative for back pain, neck pain and neck stiffness.   Skin:  Negative for rash.        Chronic wounds to the sacrum and bilateral thighs   Neurological:  Negative for numbness.   Hematological:  Does not bruise/bleed easily.       Physical Exam     Initial Vitals   BP Pulse Resp Temp SpO2   10/08/24 1154 10/08/24 1152 10/08/24 1152 10/08/24 1152 10/08/24 1152   (!) 85/57 75 20 98.7 °F (37.1 °C) 98 %      MAP       --                Physical Exam    Nursing note and vitals reviewed.  Constitutional: He appears well-developed. He is diaphoretic.   Eyes: EOM are normal. Pupils are equal, round, and reactive to light.   Cardiovascular:  Normal rate and regular rhythm.           No murmur heard.  Pulmonary/Chest: Breath sounds normal. No respiratory distress. He has no wheezes. He has no rales.   Abdominal: Abdomen is soft. He exhibits no distension. There is no abdominal tenderness.     Neurological: He is alert and oriented to person, place, and  time.   Quadriplegic. No sensation from the neck down. Intermittent tremor.    Skin: Skin is warm.   Chronic wounds to the sacrum and bilateral thighs. Wound to the sacrum has exposed bone.          ED Course   Procedures  Labs Reviewed   COMPREHENSIVE METABOLIC PANEL - Abnormal       Result Value    Sodium 135 (*)     Potassium 5.3 (*)     Chloride 107      CO2 19 (*)     Glucose 240 (*)     Blood Urea Nitrogen 34.1 (*)     Creatinine 2.08 (*)     Calcium 9.5      Protein Total 8.9 (*)     Albumin 3.1 (*)     Globulin 5.8 (*)     Albumin/Globulin Ratio 0.5 (*)     Bilirubin Total 0.3            ALT 18      AST 13      eGFR 37      Anion Gap 9.0      BUN/Creatinine Ratio 16     URINALYSIS, REFLEX TO URINE CULTURE - Abnormal    Color, UA Light-Yellow      Appearance, UA Clear      Specific Gravity, UA 1.015      pH, UA 8.0      Protein, UA 1+ (*)     Glucose, UA Normal      Ketones, UA Negative      Blood, UA Negative      Bilirubin, UA Negative      Urobilinogen, UA Normal      Nitrites, UA Negative      Leukocyte Esterase,  (*)     RBC, UA 0-5      WBC, UA 11-20 (*)     Bacteria, UA Moderate (*)     Squamous Epithelial Cells, UA Trace      Mucous, UA Trace (*)     Triple Phosphate Crystals, UA Many (*)    CBC WITH DIFFERENTIAL - Abnormal    WBC 10.49      RBC 4.07 (*)     Hgb 11.2 (*)     Hct 35.4 (*)     MCV 87.0      MCH 27.5      MCHC 31.6 (*)     RDW 15.5      Platelet 352      MPV 10.7 (*)     Neut % 73.2      Lymph % 19.9      Mono % 4.3      Eos % 1.3      Basophil % 0.6      Lymph # 2.09      Neut # 7.68      Mono # 0.45      Eos # 0.14      Baso # 0.06      IG# 0.07 (*)     IG% 0.7      NRBC% 0.0     SEDIMENTATION RATE - Abnormal    Sed Rate 86 (*)    C-REACTIVE PROTEIN - Abnormal    .10 (*)    LACTIC ACID, PLASMA - Normal    Lactic Acid Level 1.4     BLOOD CULTURE [facility]   BLOOD CULTURE [facility]   CULTURE, URINE   CBC W/ AUTO DIFFERENTIAL    Narrative:     The following orders were created for  panel order CBC auto differential.  Procedure                               Abnormality         Status                     ---------                               -----------         ------                     CBC with Differential[[id number]]       Abnormal            Final result                 Please view results for these tests on the individual orders.   POCT GLUCOSE MONITORING CONTINUOUS          Imaging Results    None          Medications   piperacillin-tazobactam (ZOSYN) 4.5 g in D5W 100 mL IVPB (MB+) (has no administration in time range)   mupirocin 2 % ointment ( Nasal Given 10/8/24 2033)   sodium chloride 0.9% flush 10 mL (has no administration in time range)   ondansetron injection 4 mg (has no administration in time range)   prochlorperazine injection Soln 5 mg (has no administration in time range)   acetaminophen tablet 650 mg (has no administration in time range)   acetaminophen tablet 1,000 mg (has no administration in time range)   naloxone 0.4 mg/mL injection 0.02 mg (has no administration in time range)   glucose chewable tablet 16 g (has no administration in time range)   glucose chewable tablet 24 g (has no administration in time range)   glucagon (human recombinant) injection 1 mg (has no administration in time range)   enoxaparin injection 40 mg (40 mg Subcutaneous Given 10/8/24 2033)   HYDROcodone-acetaminophen 5-325 mg per tablet 1 tablet (has no administration in time range)   dextrose 10% bolus 125 mL 125 mL (has no administration in time range)   dextrose 10% bolus 250 mL 250 mL (has no administration in time range)   doxycycline tablet 100 mg (100 mg Oral Given 10/8/24 2033)   lactated ringers infusion ( Intravenous New Bag 10/8/24 1851)   ascorbic acid (vitamin C) tablet 500 mg (has no administration in time range)   ferrous sulfate tablet 1 each (has no administration in time range)   insulin aspart U-100 injection 0-10 Units (3 Units Subcutaneous Given 10/8/24 2034)   lactated  ringers bolus 1,000 mL (0 mLs Intravenous Stopped 10/8/24 1342)   lactated ringers bolus 1,000 mL (0 mLs Intravenous Stopped 10/8/24 1507)   piperacillin-tazobactam (ZOSYN) 4.5 g in D5W 100 mL IVPB (MB+) (0 g Intravenous Stopped 10/8/24 1612)     Medical Decision Making  Problems Addressed:  Sacral decubitus ulcer: acute illness or injury    Amount and/or Complexity of Data Reviewed  Labs: ordered.    Risk  Decision regarding hospitalization.    Differential diagnosis (includes but is not limited to):   Sacral decubitus, osteomyelitis, abscess, cellulitis    MDM Narrative  58-year-old male with a history of quadriplegia arrives for evaluation of sacral wounds.  Patient said by his wound care specialist for admission for IV antibiotics and possible debridement.  Labs reviewed, blood cultures pending, broad-spectrum antibiotics initiated, aggressive IV fluid resuscitation initiated as well.  Patient remains hemodynamically stable after 30 cc/kilos IV fluid bolus, no indication for vasopressors.  Wound care and general surgery consulted, appreciate recommendations.  Case discussed with the hospitalist, will admit.    Dispo: Admit    My independent radiology interpretation: as above  Point of care US (independently performed and interpreted):   Decision rules/clinical scoring:     Sepsis Perfusion Assessment:     Amount and/or Complexity of Data Reviewed  Independent historian: none   Summary of history:   External data reviewed: notes from previous ED visits and notes from clinic visits  Summary of data reviewed: Prior records reviewed  Risk and benefits of testing: discussed   Labs: ordered and reviewed  Radiology: ordered and independent interpretation performed (see above or ED course)  ECG/medicine tests: ordered and independent interpretation performed (see above or ED course)  Discussion of management or test interpretation with external provider(s): discussed with hospitalist physician and discussed with  surgery, wound care consultant   Summary of discussion: as above    Risk  Parenteral controlled substances   Drug therapy requiring intense monitoring for toxicity   Decision regarding hospitalization  Shared decision making     Critical Care  none    Data Reviewed/Counseling: I have personally reviewed the patient's vital signs, nursing notes, and other relevant tests, information, and imaging. I had a detailed discussion regarding the historical points, exam findings, and any diagnostic results supporting the discharge diagnosis. I personally performed the history, PE, MDM and procedures as documented above and agree with the scribe's documentation.    Portions of this note were dictated using voice recognition software. Although it was reviewed for accuracy, some inherent voice recognition errors may have occurred and may be present in this document.          Scribe Attestation:   Scribe #1: I performed the above scribed service and the documentation accurately describes the services I performed. I attest to the accuracy of the note.    Attending Attestation:           Physician Attestation for Scribe:  Physician Attestation Statement for Scribe #1: I, Harry Mccoy MD, reviewed documentation, as scribed by Kris Craven in my presence, and it is both accurate and complete.                                    Clinical Impression:  Final diagnoses:  [L89.159] Sacral decubitus ulcer          ED Disposition Condition    Admit Stable                Harry Mccoy MD  10/08/24 1769

## 2024-10-09 PROBLEM — L89.224 PRESSURE ULCER OF LEFT HIP, STAGE 4: Chronic | Status: ACTIVE | Noted: 2024-10-09

## 2024-10-09 PROBLEM — L89.154 PRESSURE ULCER OF SACRAL REGION, STAGE 4: Chronic | Status: ACTIVE | Noted: 2024-10-09

## 2024-10-09 PROBLEM — L89.152 SACRAL DECUBITUS ULCER, STAGE II: Chronic | Status: RESOLVED | Noted: 2023-08-18 | Resolved: 2024-10-09

## 2024-10-09 LAB
ALBUMIN SERPL-MCNC: 2.5 G/DL (ref 3.5–5)
ALBUMIN/GLOB SERPL: 0.6 RATIO (ref 1.1–2)
ALP SERPL-CCNC: 84 UNIT/L (ref 40–150)
ALT SERPL-CCNC: 14 UNIT/L (ref 0–55)
ANION GAP SERPL CALC-SCNC: 7 MEQ/L
AST SERPL-CCNC: 8 UNIT/L (ref 5–34)
BASOPHILS # BLD AUTO: 0.05 X10(3)/MCL
BASOPHILS NFR BLD AUTO: 0.6 %
BILIRUB SERPL-MCNC: 0.2 MG/DL
BUN SERPL-MCNC: 28.5 MG/DL (ref 8.4–25.7)
CALCIUM SERPL-MCNC: 8.4 MG/DL (ref 8.4–10.2)
CHLORIDE SERPL-SCNC: 111 MMOL/L (ref 98–107)
CO2 SERPL-SCNC: 20 MMOL/L (ref 22–29)
CREAT SERPL-MCNC: 1.54 MG/DL (ref 0.73–1.18)
CREAT/UREA NIT SERPL: 19
CRP SERPL-MCNC: 98.4 MG/L
EOSINOPHIL # BLD AUTO: 0.18 X10(3)/MCL (ref 0–0.9)
EOSINOPHIL NFR BLD AUTO: 2.1 %
ERYTHROCYTE [DISTWIDTH] IN BLOOD BY AUTOMATED COUNT: 15.7 % (ref 11.5–17)
ERYTHROCYTE [SEDIMENTATION RATE] IN BLOOD: 105 MM/HR (ref 0–15)
FERRITIN SERPL-MCNC: 333.17 NG/ML (ref 21.81–274.66)
GFR SERPLBLD CREATININE-BSD FMLA CKD-EPI: 53 ML/MIN/1.73/M2
GLOBULIN SER-MCNC: 4 GM/DL (ref 2.4–3.5)
GLUCOSE SERPL-MCNC: 197 MG/DL (ref 74–100)
HCT VFR BLD AUTO: 27.3 % (ref 42–52)
HGB BLD-MCNC: 8.5 G/DL (ref 14–18)
IMM GRANULOCYTES # BLD AUTO: 0.03 X10(3)/MCL (ref 0–0.04)
IMM GRANULOCYTES NFR BLD AUTO: 0.4 %
IRON SATN MFR SERPL: 13 % (ref 20–50)
IRON SERPL-MCNC: 22 UG/DL (ref 65–175)
LYMPHOCYTES # BLD AUTO: 2.15 X10(3)/MCL (ref 0.6–4.6)
LYMPHOCYTES NFR BLD AUTO: 25.4 %
MCH RBC QN AUTO: 27.2 PG (ref 27–31)
MCHC RBC AUTO-ENTMCNC: 31.1 G/DL (ref 33–36)
MCV RBC AUTO: 87.2 FL (ref 80–94)
MONOCYTES # BLD AUTO: 0.53 X10(3)/MCL (ref 0.1–1.3)
MONOCYTES NFR BLD AUTO: 6.3 %
NEUTROPHILS # BLD AUTO: 5.52 X10(3)/MCL (ref 2.1–9.2)
NEUTROPHILS NFR BLD AUTO: 65.2 %
NRBC BLD AUTO-RTO: 0 %
PLATELET # BLD AUTO: 298 X10(3)/MCL (ref 130–400)
PMV BLD AUTO: 10.5 FL (ref 7.4–10.4)
POCT GLUCOSE: 296 MG/DL (ref 70–110)
POTASSIUM SERPL-SCNC: 5.9 MMOL/L (ref 3.5–5.1)
PROT SERPL-MCNC: 6.5 GM/DL (ref 6.4–8.3)
RBC # BLD AUTO: 3.13 X10(6)/MCL (ref 4.7–6.1)
SODIUM SERPL-SCNC: 138 MMOL/L (ref 136–145)
TIBC SERPL-MCNC: 146 UG/DL (ref 69–240)
TIBC SERPL-MCNC: 168 UG/DL (ref 250–450)
TRANSFERRIN SERPL-MCNC: 153 MG/DL (ref 174–364)
VIT B12 SERPL-MCNC: 920 PG/ML (ref 213–816)
WBC # BLD AUTO: 8.46 X10(3)/MCL (ref 4.5–11.5)

## 2024-10-09 PROCEDURE — 63600175 PHARM REV CODE 636 W HCPCS: Performed by: INTERNAL MEDICINE

## 2024-10-09 PROCEDURE — 36415 COLL VENOUS BLD VENIPUNCTURE: CPT

## 2024-10-09 PROCEDURE — 80053 COMPREHEN METABOLIC PANEL: CPT | Performed by: NURSE PRACTITIONER

## 2024-10-09 PROCEDURE — 21400001 HC TELEMETRY ROOM

## 2024-10-09 PROCEDURE — 25000003 PHARM REV CODE 250: Performed by: INTERNAL MEDICINE

## 2024-10-09 PROCEDURE — 25000003 PHARM REV CODE 250: Performed by: NURSE PRACTITIONER

## 2024-10-09 PROCEDURE — 85025 COMPLETE CBC W/AUTO DIFF WBC: CPT | Performed by: NURSE PRACTITIONER

## 2024-10-09 PROCEDURE — 82728 ASSAY OF FERRITIN: CPT | Performed by: INTERNAL MEDICINE

## 2024-10-09 PROCEDURE — 82607 VITAMIN B-12: CPT | Performed by: INTERNAL MEDICINE

## 2024-10-09 PROCEDURE — A9503 TC99M MEDRONATE: HCPCS | Performed by: INTERNAL MEDICINE

## 2024-10-09 PROCEDURE — 84134 ASSAY OF PREALBUMIN: CPT

## 2024-10-09 PROCEDURE — 25000003 PHARM REV CODE 250

## 2024-10-09 PROCEDURE — 83550 IRON BINDING TEST: CPT | Performed by: INTERNAL MEDICINE

## 2024-10-09 PROCEDURE — 36415 COLL VENOUS BLD VENIPUNCTURE: CPT | Performed by: NURSE PRACTITIONER

## 2024-10-09 PROCEDURE — 86140 C-REACTIVE PROTEIN: CPT

## 2024-10-09 PROCEDURE — 63600175 PHARM REV CODE 636 W HCPCS: Performed by: NURSE PRACTITIONER

## 2024-10-09 PROCEDURE — 85652 RBC SED RATE AUTOMATED: CPT

## 2024-10-09 RX ORDER — SODIUM BICARBONATE 650 MG/1
650 TABLET ORAL 3 TIMES DAILY
Status: COMPLETED | OUTPATIENT
Start: 2024-10-09 | End: 2024-10-10

## 2024-10-09 RX ORDER — DILTIAZEM HYDROCHLORIDE 120 MG/1
120 CAPSULE, COATED, EXTENDED RELEASE ORAL DAILY
Status: DISCONTINUED | OUTPATIENT
Start: 2024-10-09 | End: 2024-10-15 | Stop reason: HOSPADM

## 2024-10-09 RX ORDER — INSULIN GLARGINE 100 [IU]/ML
15 INJECTION, SOLUTION SUBCUTANEOUS NIGHTLY
Status: DISCONTINUED | OUTPATIENT
Start: 2024-10-09 | End: 2024-10-15 | Stop reason: HOSPADM

## 2024-10-09 RX ORDER — OXYBUTYNIN CHLORIDE 5 MG/1
5 TABLET ORAL 3 TIMES DAILY
Status: DISCONTINUED | OUTPATIENT
Start: 2024-10-09 | End: 2024-10-15 | Stop reason: HOSPADM

## 2024-10-09 RX ADMIN — PIPERACILLIN SODIUM AND TAZOBACTAM SODIUM 4.5 G: 4; .5 INJECTION, POWDER, LYOPHILIZED, FOR SOLUTION INTRAVENOUS at 07:10

## 2024-10-09 RX ADMIN — Medication 500 MG: at 09:10

## 2024-10-09 RX ADMIN — MUPIROCIN: 20 OINTMENT TOPICAL at 08:10

## 2024-10-09 RX ADMIN — SODIUM BICARBONATE 650 MG TABLET 650 MG: at 09:10

## 2024-10-09 RX ADMIN — COLLAGENASE SANTYL: 250 OINTMENT TOPICAL at 02:10

## 2024-10-09 RX ADMIN — DOXYCYCLINE HYCLATE 100 MG: 100 TABLET, COATED ORAL at 08:10

## 2024-10-09 RX ADMIN — SODIUM CHLORIDE, POTASSIUM CHLORIDE, SODIUM LACTATE AND CALCIUM CHLORIDE: 600; 310; 30; 20 INJECTION, SOLUTION INTRAVENOUS at 06:10

## 2024-10-09 RX ADMIN — DOXYCYCLINE HYCLATE 100 MG: 100 TABLET, COATED ORAL at 09:10

## 2024-10-09 RX ADMIN — INSULIN ASPART 6 UNITS: 100 INJECTION, SOLUTION INTRAVENOUS; SUBCUTANEOUS at 06:10

## 2024-10-09 RX ADMIN — DILTIAZEM HYDROCHLORIDE 120 MG: 120 CAPSULE, COATED, EXTENDED RELEASE ORAL at 09:10

## 2024-10-09 RX ADMIN — ENOXAPARIN SODIUM 40 MG: 40 INJECTION SUBCUTANEOUS at 06:10

## 2024-10-09 RX ADMIN — SODIUM ZIRCONIUM CYCLOSILICATE 10 G: 10 POWDER, FOR SUSPENSION ORAL at 09:10

## 2024-10-09 RX ADMIN — PIPERACILLIN SODIUM AND TAZOBACTAM SODIUM 4.5 G: 4; .5 INJECTION, POWDER, LYOPHILIZED, FOR SOLUTION INTRAVENOUS at 12:10

## 2024-10-09 RX ADMIN — MUPIROCIN: 20 OINTMENT TOPICAL at 09:10

## 2024-10-09 RX ADMIN — INSULIN GLARGINE 15 UNITS: 100 INJECTION, SOLUTION SUBCUTANEOUS at 09:10

## 2024-10-09 RX ADMIN — OXYBUTYNIN CHLORIDE 5 MG: 5 TABLET ORAL at 06:10

## 2024-10-09 RX ADMIN — FERROUS SULFATE TAB 325 MG (65 MG ELEMENTAL FE) 1 EACH: 325 (65 FE) TAB at 09:10

## 2024-10-09 RX ADMIN — TECHNETIUM TC 99M MEDRONATE 26.3 MILLICURIE: 20 INJECTION, POWDER, LYOPHILIZED, FOR SOLUTION INTRAVENOUS at 12:10

## 2024-10-09 RX ADMIN — SODIUM ZIRCONIUM CYCLOSILICATE 10 G: 10 POWDER, FOR SUSPENSION ORAL at 08:10

## 2024-10-09 RX ADMIN — SODIUM BICARBONATE 650 MG TABLET 650 MG: at 06:10

## 2024-10-09 RX ADMIN — SODIUM ZIRCONIUM CYCLOSILICATE 10 G: 10 POWDER, FOR SUSPENSION ORAL at 06:10

## 2024-10-09 RX ADMIN — PIPERACILLIN SODIUM AND TAZOBACTAM SODIUM 4.5 G: 4; .5 INJECTION, POWDER, LYOPHILIZED, FOR SOLUTION INTRAVENOUS at 06:10

## 2024-10-09 RX ADMIN — INSULIN ASPART 3 UNITS: 100 INJECTION, SOLUTION INTRAVENOUS; SUBCUTANEOUS at 09:10

## 2024-10-09 RX ADMIN — SODIUM BICARBONATE 650 MG TABLET 650 MG: at 08:10

## 2024-10-09 RX ADMIN — OXYBUTYNIN CHLORIDE 5 MG: 5 TABLET ORAL at 08:10
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
## 2024-10-09 NOTE — PROGRESS NOTES
[facility] - Ortho Neuro  Wound Care    Patient Name:  Antonio Galvan II   MRN:  [id number]  Date: 10/9/2024  Diagnosis: <principal problem not specified>    History:     Past Medical History:   Diagnosis Date    A-fib     Cardiac arrest     7/2022    Chronic skin ulcer     DM (diabetes mellitus)     Infected decubitus ulcer     Lymphedema of leg     Neurogenic bladder     Obesity, unspecified     Pressure ulcer of heel     Pressure ulcer of right foot, stage 3     Pressure ulcer of right ischium     Quadriplegia     Quadriplegic spinal paralysis        Social History     Socioeconomic History    Marital status: Single   Tobacco Use    Smoking status: Never    Smokeless tobacco: Never   Substance and Sexual Activity    Alcohol use: Never    Drug use: Never    Sexual activity: Not Currently     Social Drivers of Health     Financial Resource Strain: Low Risk  (2/27/2024)    Overall Financial Resource Strain (CARDIA)     Difficulty of Paying Living Expenses: Not hard at all   Food Insecurity: No Food Insecurity (2/27/2024)    Hunger Vital Sign     Worried About Running Out of Food in the Last Year: Never true     Ran Out of Food in the Last Year: Never true   Transportation Needs: No Transportation Needs (2/27/2024)    PRAPARE - Transportation     Lack of Transportation (Medical): No     Lack of Transportation (Non-Medical): No   Physical Activity: Inactive (2/27/2024)    Exercise Vital Sign     Days of Exercise per Week: 0 days     Minutes of Exercise per Session: 0 min   Stress: No Stress Concern Present (2/27/2024)    [employer] [place] [employer] - Occupational Stress Questionnaire     Feeling of Stress : Only a little   Recent Concern: Stress - Stress Concern Present (2/24/2024)    [place] [place] [employer] - Occupational Stress Questionnaire     Feeling of Stress : To some extent   Housing Stability: Low Risk  (2/27/2024)    Housing Stability Vital Sign     Unable to Pay  for Housing in the Last Year: No     Number of Places Lived in the Last Year: 1     Unstable Housing in the Last Year: No       Precautions:     Allergies as of 10/08/2024 - Reviewed 10/08/2024   Allergen Reaction Noted    Metoprolol Other (See Comments) 03/20/2024       WO Assessment Details/Treatment   WOCN consulted for multiple wounds. Discussed plan of care with nurse Galarza prior to visiting the patient. Introduced self and explained reason for visit, patient agreeable to be seen. Wife at bedside. Wound care NP Marino at bedside. She and Dr. Ho to give treatment recommendations for the patient. WOCN to defer care to them. DA Pichardo to order specialty bed. Nursing to continue with treatment recommendations and other preventative measures. WOCN to be reconsulted if needed.   10/09/2024
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
## 2024-10-09 NOTE — H&P
[facility] Hospital Medicine History & Physical Examination       Patient Name: Antonio Galvan II  MRN: [id number]  Patient Class: IP- Inpatient   Admission Date: 10/8/2024   Admitting Physician: DUT[facility]   Length of Stay: 1  Attending Physician: Alfie Cash MD  Primary Care Provider: Jennifer Fernando NP  Face-to-Face encounter date: 10/09/2024  Code Status:Full  Chief Complaint: Wound Infection (Arrives POV, sent by Dr Huff for IV abx and debridment for a wound infection to sacrum. )      Screening for Social Drivers for health:  Patient screened for food insecurity, housing instability, transportation needs, utility difficulties, and interpersonal safety (select all that apply as identified as concern)  []Housing or Food  []Transportation Needs  []Utility Difficulties  []Interpersonal safety  [x]None      Patient information was obtained from patient, patient's family, past medical records and ER records.  ED records were reviewed in detail and documented below    HISTORY OF PRESENT ILLNESS:   Antonio Galvan II is a 58 y.o. male who  has a past medical history of A-fib, Cardiac arrest, Chronic skin ulcer, DM (diabetes mellitus), Infected decubitus ulcer, Lymphedema of leg, Neurogenic bladder, Obesity, unspecified, Pressure ulcer of heel, Pressure ulcer of right foot, stage 3, Pressure ulcer of right ischium, Quadriplegia, and Quadriplegic spinal paralysis. Neurogenic bladder with Suprapubic catheter. The patient presented to [facility] on 10/8/2024 with a primary complaint of worsening decubitus ulcer. Pt was recently seen his Wound care MD who suggested to come to the ER for concern of bone infection. Pt denies fever, chills, night sweats or other c/o.     Afebrile with labile BP and transient hypotension on arrival, On RA. Labs showed normal WBC, Hgb 11.2, SED rate 86, , , K 5.3, Cr 2.0, , LA 1.4, UA WBC 11-20/HPF. Pt was given IVF, Zosyn in the ED. Pt  declined Vancomycin due to side effects of neurotoxicity in the past. General Surgery was consulted and Pt was admitted under  service.      PAST MEDICAL HISTORY:     Past Medical History:   Diagnosis Date    A-fib     Cardiac arrest     7/2022    Chronic skin ulcer     DM (diabetes mellitus)     Infected decubitus ulcer     Lymphedema of leg     Neurogenic bladder     Obesity, unspecified     Pressure ulcer of heel     Pressure ulcer of right foot, stage 3     Pressure ulcer of right ischium     Quadriplegia     Quadriplegic spinal paralysis        PAST SURGICAL HISTORY:     Past Surgical History:   Procedure Laterality Date    APPLICATION OF WOUND VACUUM-ASSISTED CLOSURE DEVICE Right 5/12/2023    Procedure: APPLICATION, WOUND VAC;  Surgeon: Keyonna Jean MD;  Location: [facility];  Service: General;  Laterality: Right;    DEBRIDEMENT OF BUTTOCKS Right 5/12/2023    Procedure: DEBRIDEMENT, BUTTOCK;  Surgeon: Keyonna Jean MD;  Location: [facility] OR;  Service: General;  Laterality: Right;    INCISION AND DRAINAGE HIP Bilateral 8/16/2023    Procedure: INCISION AND DRAINAGE, HIP;  Surgeon: Ryan Tirado MD;  Location: [facility] OR;  Service: General;  Laterality: Bilateral;  1. Excisional debridement skin to bone, skin to bone with biopsy and debridement of hard bone at the Left hip necrotic wound 11 cm long 5-1/2 cm wide 4-1/2 cm deep upon completion of debridement   2. Excisional debridement skin to muscle right hip with debridement    PRESSURE ULCER DEBRIDEMENT N/A 2/27/2024    Procedure: DEBRIDEMENT, PRESSURE ULCER;  Surgeon: Keyonna Jean MD;  Location: [facility] OR;  Service: General;  Laterality: N/A;       ALLERGIES:   Metoprolol    FAMILY HISTORY:   Reviewed and negative    SOCIAL HISTORY:     Social History     Tobacco Use    Smoking status: Never    Smokeless tobacco: Never   Substance Use Topics    Alcohol use: Never        HOME MEDICATIONS:     Prior to Admission medications    Medication Sig  Start Date End Date Taking? Authorizing Provider   ascorbic acid, vitamin C, (VITAMIN C) 500 MG tablet Take 1 tablet (500 mg total) by mouth once daily. 9/28/23   Jeanette Mann MD   collagenase (SANTYL) ointment Apply topically once daily. 5/25/23   Syl Avila MD   desonide 0.05% (DESOWEN) 0.05 % Oint Apply 1 application  topically 2 (two) times daily. 4/5/24   Provider, Historical   diltiaZEM (CARDIZEM CD) 120 MG Cp24 Take 1 capsule (120 mg total) by mouth once daily. 4/10/24 5/10/24  Pau Figueroa PA   ferrous sulfate 325 (65 FE) MG EC tablet Take 1 tablet (325 mg total) by mouth once daily. 9/27/23   Jeanette Mann MD   insulin detemir U-100 (LEVEMIR) 100 unit/mL injection Inject 15 Units into the skin every evening. 4/9/24 5/9/24  Pau Figueroa PA   JANUVIA 50 mg Tab Take 100 mg by mouth once daily. 5/4/22   Provider, Historical   ketoconazole (NIZORAL) 2 % cream  5/13/24   Provider, Historical   miconazole (MICOTIN) 2 % cream Apply topically 2 (two) times daily. 4/9/24   Pau Figueroa PA       REVIEW OF SYSTEMS:   Except as documented, all other systems reviewed and negative     PHYSICAL EXAM:     VITAL SIGNS: 24 HRS MIN & MAX LAST   Temp  Min: 98.6 °F (37 °C)  Max: 98.8 °F (37.1 °C) 98.7 °F (37.1 °C)   BP  Min: 85/57  Max: 179/87 111/65   Pulse  Min: 59  Max: 75  70   Resp  Min: 12  Max: 20 16   SpO2  Min: 97 %  Max: 100 % 97 %     General appearance: Well-developed, well-nourished male in no apparent distress.  HENT: Atraumatic head. Moist mucous membranes of oral cavity.  Eyes: Normal extraocular movements.   Neck: Supple.   Lungs: Clear to auscultation bilaterally. No wheezing present.   Heart: Regular rate and rhythm. S1 and S2 present with no murmurs/gallop/rub. No pedal edema. No JVD present.   Abdomen: Soft, non-distended, non-tender. No rebound tenderness/guarding. Bowel sounds are normal.   - Suprapubic catheter in place  Extremities: (+) diffuse muscle atrophy,  contracture, no edema   Skin:  stage IV ulcers  on L/R hip and sacral decubitus, sacral wound with palpable bone. Bill feet wounds  Neuro: Quadriplegia   Psych/mental status: Appropriate mood and affect. Responds appropriately to questions.     LABS AND IMAGING:     Recent Labs   Lab 10/08/24  1234   WBC 10.49   RBC 4.07*   HGB 11.2*   HCT 35.4*   MCV 87.0   MCH 27.5   MCHC 31.6*   RDW 15.5      MPV 10.7*       Recent Labs   Lab 10/08/24  1233   *   K 5.3*      CO2 19*   BUN 34.1*   CREATININE 2.08*   CALCIUM 9.5   ALBUMIN 3.1*   ALKPHOS 109   ALT 18   AST 13   BILITOT 0.3       Microbiology Results (last 7 days)       Procedure Component Value Units Date/Time    Urine culture [[id number]] Collected: 10/08/24 1709    Order Status: Sent Specimen: Urine Updated: 10/08/24 1749    Blood culture #1 **CANNOT BE ORDERED STAT** [[id number]] Collected: 10/08/24 1233    Order Status: Resulted Specimen: Blood Updated: 10/08/24 1506    Blood culture #2 **CANNOT BE ORDERED STAT** [[id number]] Collected: 10/08/24 1233    Order Status: Resulted Specimen: Blood Updated: 10/08/24 1506             X-Ray Chest 1 View  Narrative: EXAMINATION:  XR CHEST 1 VIEW    CLINICAL HISTORY:  Temperature;    TECHNIQUE:  Single frontal view of the chest was performed.    COMPARISON:  Radiograph 02/28/2024    FINDINGS:  Suboptimal examination due to patient positioning.  Stable enlargement of the cardiomediastinal silhouette.  Increased attenuation in the right lower lung zone concerning for infiltrate.  No pneumothorax.  No definite pleural effusion.  No acute osseous abnormality identified.  Impression: 1. Stable cardiomegaly.  2. Increased attenuation in the right lower lung zone concerning for infiltrate.    Electronically signed by: Cuco Soriano  Date:    04/07/2024  Time:    09:11      ASSESSMENT & PLAN:     Chronic decubitus ulcer Rt and Lt hips and sacrum, stage IV with exposed bones   Acute kidney injury on CKD III,  POA  Mild Hyperkalemia, POA  Anemia of chronic disease   Elevated inflammatory markers   Metabolic acidosis  Abnormal UA    Hx- PAF not on AC, Cardiac arrest per history, DM2, Quadriplegia, Neurogenic bladder, Suprapubic catheter     Plan-  Blood cultures x 2  Antimicrobial coverage with Zosyn and Oral Doxycyline   Wound care consult   General Surgery was consulted   IVF and monitor renal parameter and UOP  ISS for diabetes   Review home meds and resume as appropriate       VTE Prophylaxis: Lovenox     Patient condition:  Fair     __________________________________________________________________________  INPATIENT LIST OF MEDICATIONS     Scheduled Meds:   ascorbic acid (vitamin C)  500 mg Oral Daily    doxycycline  100 mg Oral Q12H    enoxparin  40 mg Subcutaneous Daily    ferrous sulfate  1 tablet Oral Daily    mupirocin   Nasal BID    piperacillin-tazobactam (Zosyn) IV (PEDS and ADULTS) (extended infusion is not appropriate)  4.5 g Intravenous Q8H     Continuous Infusions:   lactated ringers   Intravenous Continuous 125 mL/hr at 10/08/24 1851 New Bag at 10/08/24 1851     PRN Meds:.  Current Facility-Administered Medications:     acetaminophen, 1,000 mg, Oral, Q6H PRN    acetaminophen, 650 mg, Oral, Q4H PRN    dextrose 10%, 12.5 g, Intravenous, PRN    dextrose 10%, 25 g, Intravenous, PRN    glucagon (human recombinant), 1 mg, Intramuscular, PRN    glucose, 16 g, Oral, PRN    glucose, 24 g, Oral, PRN    HYDROcodone-acetaminophen, 1 tablet, Oral, Q6H PRN    insulin aspart U-100, 0-10 Units, Subcutaneous, QID (AC + HS) PRN    naloxone, 0.02 mg, Intravenous, PRN    ondansetron, 4 mg, Intravenous, Q4H PRN    prochlorperazine, 5 mg, Intravenous, Q6H PRN    sodium chloride 0.9%, 10 mL, Intravenous, PRN      Discharge Planning and Disposition: No mobility needs. Ambulating well. Good social support system.   Anticipated discharge    If patient was admitted under observational status it is with my approval/permission.         All diagnosis and differential diagnosis have been reviewed; assessment and plan has been documented; I have personally reviewed the labs and test results that are presently available; I have reviewed the patients medication list; I have reviewed the consulting providers response and recommendations. I have reviewed or attempted to review medical records based upon their availability.    All of the patient and family questions have been addressed and answered. Patient's is agreeable to the above stated plan. I will continue to monitor closely and make adjustments to medical management as needed.    If patient was admitted under observational status it is with my approval/permission.      Alfie Cash MD   10/08/2024
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
## 2024-10-09 NOTE — CONSULTS
Antonio LAGUERRE Mandy  1965  [id number]  10/9/2024    CONSULTING PHYSICIAN: Dr. Cash    REASON FOR CONSULTATION: SPT leaking    HPI:  The patient is a 58-year-old male with a past medical history of AFib, cardiac arrest, diabetes, decubitus ulcer, quadriplegia with neurogenic bladder.  He has had a suprapubic catheter for many years.  He was known to Dr. Michael garcia.  His catheter is exchanged every 2 weeks by home health.  He was admitted for infected sacral decubitus.  He has been having some leakage around his catheter.  He was seen in the office by Dr. Blair about a week ago with similar complaints, reports ultrasound at that time without any acute findings.  At one point he was on Myrbetriq and Ditropan however these were stopped during one of his recent hospital admissions for unknown reasons.  His catheter was exchanged about a week and a half ago.    Past Medical History:   Diagnosis Date    A-fib     Cardiac arrest     7/2022    Chronic skin ulcer     DM (diabetes mellitus)     Infected decubitus ulcer     Lymphedema of leg     Neurogenic bladder     Obesity, unspecified     Pressure ulcer of heel     Pressure ulcer of right foot, stage 3     Pressure ulcer of right ischium     Quadriplegia     Quadriplegic spinal paralysis      Past Surgical History:   Procedure Laterality Date    APPLICATION OF WOUND VACUUM-ASSISTED CLOSURE DEVICE Right 5/12/2023    Procedure: APPLICATION, WOUND VAC;  Surgeon: Keyonna Jean MD;  Location: [facility] OR;  Service: General;  Laterality: Right;    DEBRIDEMENT OF BUTTOCKS Right 5/12/2023    Procedure: DEBRIDEMENT, BUTTOCK;  Surgeon: Keyonna Jean MD;  Location: [facility] OR;  Service: General;  Laterality: Right;    INCISION AND DRAINAGE HIP Bilateral 8/16/2023    Procedure: INCISION AND DRAINAGE, HIP;  Surgeon: Ryan Tirado MD;  Location: [facility] OR;  Service: General;  Laterality: Bilateral;  1. Excisional debridement skin to bone, skin to bone with biopsy and  debridement of hard bone at the Left hip necrotic wound 11 cm long 5-1/2 cm wide 4-1/2 cm deep upon completion of debridement   2. Excisional debridement skin to muscle right hip with debridement    PRESSURE ULCER DEBRIDEMENT N/A 2/27/2024    Procedure: DEBRIDEMENT, PRESSURE ULCER;  Surgeon: Keyonna Jean MD;  Location: [facility];  Service: General;  Laterality: N/A;     No family history on file.  Social History     Tobacco Use    Smoking status: Never    Smokeless tobacco: Never   Substance Use Topics    Alcohol use: Never    Drug use: Never     Current Facility-Administered Medications   Medication Dose Route Frequency Provider Last Rate Last Admin    acetaminophen tablet 1,000 mg  1,000 mg Oral Q6H PRN Alina Boo, RICKYP        acetaminophen tablet 650 mg  650 mg Oral Q4H PRN Alina Boo FNP        ascorbic acid (vitamin C) tablet 500 mg  500 mg Oral Daily Alfie Cash MD        dextrose 10% bolus 125 mL 125 mL  12.5 g Intravenous PRN Alina Boo, RICKYP        dextrose 10% bolus 250 mL 250 mL  25 g Intravenous PRN Alina Boo, RICKYP        diltiaZEM 24 hr capsule 120 mg  120 mg Oral Daily Alfie Cash MD        doxycycline tablet 100 mg  100 mg Oral Q12H Alfie Cash MD   100 mg at 10/08/24 2033    enoxaparin injection 40 mg  40 mg Subcutaneous Daily Alina Boo FNP   40 mg at 10/08/24 2033    ferrous sulfate tablet 1 each  1 tablet Oral Daily Alfie Cash MD        glucagon (human recombinant) injection 1 mg  1 mg Intramuscular PRN Alina Boo, RICKYP        glucose chewable tablet 16 g  16 g Oral PRN Alina Boo, RICKYP        glucose chewable tablet 24 g  24 g Oral PRN Alina Boo FNP        HYDROcodone-acetaminophen 5-325 mg per tablet 1 tablet  1 tablet Oral Q6H PRN Alina Boo, RICKYP        insulin aspart U-100 injection 0-10 Units  0-10 Units Subcutaneous QID (AC + HS) PRN Alfie Cash MD   3  Units at 10/08/24 2034    lactated ringers infusion   Intravenous Continuous Alfie Cash  mL/hr at 10/09/24 0616 New Bag at 10/09/24 0616    mupirocin 2 % ointment   Nasal BID Alfie Cash MD   Given at 10/08/24 2033    naloxone 0.4 mg/mL injection 0.02 mg  0.02 mg Intravenous PRN Alina Boo FNP        ondansetron injection 4 mg  4 mg Intravenous Q4H PRN Alina Boo FNP        piperacillin-tazobactam (ZOSYN) 4.5 g in D5W 100 mL IVPB (MB+)  4.5 g Intravenous Q8H Alina Boo FNP 25 mL/hr at 10/09/24 0757 4.5 g at 10/09/24 0757    prochlorperazine injection Soln 5 mg  5 mg Intravenous Q6H PRN Alina Boo FNP        sodium bicarbonate tablet 650 mg  650 mg Oral TID Alfie Cash MD        sodium chloride 0.9% flush 10 mL  10 mL Intravenous PRN Alina Boo FNP        sodium zirconium cyclosilicate packet 10 g  10 g Oral TID Alfie Cash MD         Review of patient's allergies indicates:   Allergen Reactions    Metoprolol Other (See Comments)     Pt refuses, states his arrest occurred after receiving this medication       ROS: 12 point review of systems negative other than the HPI    PHYSICAL EXAM:  Vitals:    10/08/24 2353 10/09/24 0400 10/09/24 0507 10/09/24 0713   BP: 111/65 125/62 125/62 127/69   Pulse: 70 71 71 72   Resp: 16 18 18 18   Temp: 98.7 °F (37.1 °C) 99.3 °F (37.4 °C) 99.3 °F (37.4 °C) 99.2 °F (37.3 °C)   TempSrc:   Axillary Oral   SpO2: 97% 95% 95% 97%   Weight:       Height:           Intake/Output Summary (Last 24 hours) at 10/9/2024 0839  Last data filed at 10/9/2024 0635  Gross per 24 hour   Intake --   Output 1950 ml   Net -1950 ml       GEN: WN/WD NAD  HEENT: normocephalic, atraumatic, PERRL  CV: RRR  RESP: Even and unlabored  ABD:  Round, nondistended  :  Suprapubic catheter site is clean dry and intact, dressing in place without any active drainage.  Slightly cloudy yellow urine draining to  bag  NEURO:  Awake  alert and oriented, quadriplegia    LABS:  Recent Results (from the past 24 hours)   Comprehensive metabolic panel    Collection Time: 10/08/24 12:33 PM   Result Value Ref Range    Sodium 135 (L) 136 - 145 mmol/L    Potassium 5.3 (H) 3.5 - 5.1 mmol/L    Chloride 107 98 - 107 mmol/L    CO2 19 (L) 22 - 29 mmol/L    Glucose 240 (H) 74 - 100 mg/dL    Blood Urea Nitrogen 34.1 (H) 8.4 - 25.7 mg/dL    Creatinine 2.08 (H) 0.73 - 1.18 mg/dL    Calcium 9.5 8.4 - 10.2 mg/dL    Protein Total 8.9 (H) 6.4 - 8.3 gm/dL    Albumin 3.1 (L) 3.5 - 5.0 g/dL    Globulin 5.8 (H) 2.4 - 3.5 gm/dL    Albumin/Globulin Ratio 0.5 (L) 1.1 - 2.0 ratio    Bilirubin Total 0.3 <=1.5 mg/dL     40 - 150 unit/L    ALT 18 0 - 55 unit/L    AST 13 5 - 34 unit/L    eGFR 37 mL/min/1.73/m2    Anion Gap 9.0 mEq/L    BUN/Creatinine Ratio 16    C-reactive protein    Collection Time: 10/08/24 12:33 PM   Result Value Ref Range    .10 (H) <5.00 mg/L   CBC with Differential    Collection Time: 10/08/24 12:34 PM   Result Value Ref Range    WBC 10.49 4.50 - 11.50 x10(3)/mcL    RBC 4.07 (L) 4.70 - 6.10 x10(6)/mcL    Hgb 11.2 (L) 14.0 - 18.0 g/dL    Hct 35.4 (L) 42.0 - 52.0 %    MCV 87.0 80.0 - 94.0 fL    MCH 27.5 27.0 - 31.0 pg    MCHC 31.6 (L) 33.0 - 36.0 g/dL    RDW 15.5 11.5 - 17.0 %    Platelet 352 130 - 400 x10(3)/mcL    MPV 10.7 (H) 7.4 - 10.4 fL    Neut % 73.2 %    Lymph % 19.9 %    Mono % 4.3 %    Eos % 1.3 %    Basophil % 0.6 %    Lymph # 2.09 0.6 - 4.6 x10(3)/mcL    Neut # 7.68 2.1 - 9.2 x10(3)/mcL    Mono # 0.45 0.1 - 1.3 x10(3)/mcL    Eos # 0.14 0 - 0.9 x10(3)/mcL    Baso # 0.06 <=0.2 x10(3)/mcL    IG# 0.07 (H) 0 - 0.04 x10(3)/mcL    IG% 0.7 %    NRBC% 0.0 %   Lactic acid, plasma    Collection Time: 10/08/24 12:34 PM   Result Value Ref Range    Lactic Acid Level 1.4 0.5 - 2.2 mmol/L   Sedimentation rate    Collection Time: 10/08/24 12:34 PM   Result Value Ref Range    Sed Rate 86 (H) 0 - 15 mm/hr   Urinalysis, Reflex to Urine Culture     Collection Time: 10/08/24  5:09 PM    Specimen: Urine   Result Value Ref Range    Color, UA Light-Yellow Yellow, Light-Yellow, Colorless, Straw, Dark-Yellow    Appearance, UA Clear Clear    Specific Gravity, UA 1.015 1.005 - 1.030    pH, UA 8.0 5.0 - 8.5    Protein, UA 1+ (A) Negative    Glucose, UA Normal Negative, Normal    Ketones, UA Negative Negative    Blood, UA Negative Negative    Bilirubin, UA Negative Negative    Urobilinogen, UA Normal 0.2, 1.0, Normal    Nitrites, UA Negative Negative    Leukocyte Esterase,  (A) Negative    RBC, UA 0-5 None Seen, 0-2, 3-5, 0-5 /HPF    WBC, UA 11-20 (A) None Seen, 0-2, 3-5, 0-5 /HPF    Bacteria, UA Moderate (A) None Seen, Trace /HPF    Squamous Epithelial Cells, UA Trace None Seen, Trace, Rare /HPF    Mucous, UA Trace (A) None Seen /LPF    Triple Phosphate Crystals, UA Many (A) None Seen   Urine culture    Collection Time: 10/08/24  5:09 PM    Specimen: Urine   Result Value Ref Range    Urine Culture >/= 100,000 colonies/ml Gram-negative Rods (A)    Comprehensive Metabolic Panel (CMP)    Collection Time: 10/09/24  6:23 AM   Result Value Ref Range    Sodium 138 136 - 145 mmol/L    Potassium 5.9 (H) 3.5 - 5.1 mmol/L    Chloride 111 (H) 98 - 107 mmol/L    CO2 20 (L) 22 - 29 mmol/L    Glucose 197 (H) 74 - 100 mg/dL    Blood Urea Nitrogen 28.5 (H) 8.4 - 25.7 mg/dL    Creatinine 1.54 (H) 0.73 - 1.18 mg/dL    Calcium 8.4 8.4 - 10.2 mg/dL    Protein Total 6.5 6.4 - 8.3 gm/dL    Albumin 2.5 (L) 3.5 - 5.0 g/dL    Globulin 4.0 (H) 2.4 - 3.5 gm/dL    Albumin/Globulin Ratio 0.6 (L) 1.1 - 2.0 ratio    Bilirubin Total 0.2 <=1.5 mg/dL    ALP 84 40 - 150 unit/L    ALT 14 0 - 55 unit/L    AST 8 5 - 34 unit/L    eGFR 53 mL/min/1.73/m2    Anion Gap 7.0 mEq/L    BUN/Creatinine Ratio 19    CBC with Differential    Collection Time: 10/09/24  6:23 AM   Result Value Ref Range    WBC 8.46 4.50 - 11.50 x10(3)/mcL    RBC 3.13 (L) 4.70 - 6.10 x10(6)/mcL    Hgb 8.5 (L) 14.0 - 18.0 g/dL    Hct  27.3 (L) 42.0 - 52.0 %    MCV 87.2 80.0 - 94.0 fL    MCH 27.2 27.0 - 31.0 pg    MCHC 31.1 (L) 33.0 - 36.0 g/dL    RDW 15.7 11.5 - 17.0 %    Platelet 298 130 - 400 x10(3)/mcL    MPV 10.5 (H) 7.4 - 10.4 fL    Neut % 65.2 %    Lymph % 25.4 %    Mono % 6.3 %    Eos % 2.1 %    Basophil % 0.6 %    Lymph # 2.15 0.6 - 4.6 x10(3)/mcL    Neut # 5.52 2.1 - 9.2 x10(3)/mcL    Mono # 0.53 0.1 - 1.3 x10(3)/mcL    Eos # 0.18 0 - 0.9 x10(3)/mcL    Baso # 0.05 <=0.2 x10(3)/mcL    IG# 0.03 0 - 0.04 x10(3)/mcL    IG% 0.4 %    NRBC% 0.0 %   Iron and TIBC    Collection Time: 10/09/24  6:23 AM   Result Value Ref Range    Iron Binding Capacity Unsaturated 146 69 - 240 ug/dL    Iron Level 22 (L) 65 - 175 ug/dL    Transferrin 153 (L) 174 - 364 mg/dL    Iron Binding Capacity Total 168 (L) 250 - 450 ug/dL    Iron Saturation 13 (L) 20 - 50 %       IMAGING:  Imaging Results    None         ASSESSMENT:  Quadriplegia with Neurogenic bladder with chronic suprapubic catheter  -leakage around catheter for about the last week and a half  -routine catheter exchanges every 2 weeks, due for exchange in a few days    Infected sacral decubitus  -plans for bedside debridement per General surgery today    PLAN:  -we will start Ditropan   -catheter ordered, we will plan to exchange once supplies available.  -discussed with the patient and nursing        Lynsey Hayes NP

## 2024-10-09 NOTE — SUBJECTIVE & OBJECTIVE
Scheduled Meds:   ascorbic acid (vitamin C)  500 mg Oral Daily    diltiaZEM  120 mg Oral Daily    doxycycline  100 mg Oral Q12H    enoxparin  40 mg Subcutaneous Daily    ferrous sulfate  1 tablet Oral Daily    mupirocin   Nasal BID    oxybutynin  5 mg Oral TID    piperacillin-tazobactam (Zosyn) IV (PEDS and ADULTS) (extended infusion is not appropriate)  4.5 g Intravenous Q8H    sodium bicarbonate  650 mg Oral TID    sodium zirconium cyclosilicate  10 g Oral TID     Continuous Infusions:   lactated ringers   Intravenous Continuous 125 mL/hr at 10/09/24 0616 New Bag at 10/09/24 0616     PRN Meds:  Current Facility-Administered Medications:     acetaminophen, 1,000 mg, Oral, Q6H PRN    acetaminophen, 650 mg, Oral, Q4H PRN    dextrose 10%, 12.5 g, Intravenous, PRN    dextrose 10%, 25 g, Intravenous, PRN    glucagon (human recombinant), 1 mg, Intramuscular, PRN    glucose, 16 g, Oral, PRN    glucose, 24 g, Oral, PRN    HYDROcodone-acetaminophen, 1 tablet, Oral, Q6H PRN    insulin aspart U-100, 0-10 Units, Subcutaneous, QID (AC + HS) PRN    naloxone, 0.02 mg, Intravenous, PRN    ondansetron, 4 mg, Intravenous, Q4H PRN    prochlorperazine, 5 mg, Intravenous, Q6H PRN    sodium chloride 0.9%, 10 mL, Intravenous, PRN    Review of patient's allergies indicates:   Allergen Reactions    Metoprolol Other (See Comments)     Pt refuses, states his arrest occurred after receiving this medication        Past Medical History:   Diagnosis Date    A-fib     Cardiac arrest     7/2022    Chronic skin ulcer     DM (diabetes mellitus)     Infected decubitus ulcer     Lymphedema of leg     Neurogenic bladder     Obesity, unspecified     Pressure ulcer of heel     Pressure ulcer of right foot, stage 3     Pressure ulcer of right ischium     Quadriplegia     Quadriplegic spinal paralysis      Past Surgical History:   Procedure Laterality Date    APPLICATION OF WOUND VACUUM-ASSISTED CLOSURE DEVICE Right 5/12/2023    Procedure: APPLICATION,  WOUND VAC;  Surgeon: Keyonna Jean MD;  Location: [facility] OR;  Service: General;  Laterality: Right;    DEBRIDEMENT OF BUTTOCKS Right 5/12/2023    Procedure: DEBRIDEMENT, BUTTOCK;  Surgeon: Keyonna Jean MD;  Location: [facility] OR;  Service: General;  Laterality: Right;    INCISION AND DRAINAGE HIP Bilateral 8/16/2023    Procedure: INCISION AND DRAINAGE, HIP;  Surgeon: Ryan Tirado MD;  Location: [facility] OR;  Service: General;  Laterality: Bilateral;  1. Excisional debridement skin to bone, skin to bone with biopsy and debridement of hard bone at the Left hip necrotic wound 11 cm long 5-1/2 cm wide 4-1/2 cm deep upon completion of debridement   2. Excisional debridement skin to muscle right hip with debridement    PRESSURE ULCER DEBRIDEMENT N/A 2/27/2024    Procedure: DEBRIDEMENT, PRESSURE ULCER;  Surgeon: Keyonna Jean MD;  Location: [facility] OR;  Service: General;  Laterality: N/A;       Family History    None       Tobacco Use    Smoking status: Never    Smokeless tobacco: Never   Substance and Sexual Activity    Alcohol use: Never    Drug use: Never    Sexual activity: Not Currently     Review of Systems   Constitutional:  Negative for chills, diaphoresis and fever.   Skin:  Positive for wound.   Neurological:  Positive for weakness (quadraplegia).       Objective:     Vital Signs (Most Recent):  Temp: 98.5 °F (36.9 °C) (10/09/24 1111)  Pulse: 65 (10/09/24 1111)  Resp: 18 (10/09/24 1111)  BP: 133/71 (10/09/24 1111)  SpO2: 98 % (10/09/24 1111) Vital Signs (24h Range):  Temp:  [98.5 °F (36.9 °C)-99.3 °F (37.4 °C)] 98.5 °F (36.9 °C)  Pulse:  [59-75] 65  Resp:  [12-20] 18  SpO2:  [95 %-100 %] 98 %  BP: ()/() 133/71     Weight: 104.3 kg (230 lb)  Body mass index is 34.97 kg/m².     Physical Exam  Vitals reviewed.   Constitutional:       General: He is awake. He is not in acute distress.     Appearance: He is overweight. He is ill-appearing (chronic). He is not toxic-appearing.      Comments:  Chronically ill appearing white male, bilateral lower legs bent at the knee and contracted   HENT:      Head: Normocephalic and atraumatic.      Nose: Nose normal.      Mouth/Throat:      Pharynx: Oropharynx is clear.   Cardiovascular:      Rate and Rhythm: Normal rate and regular rhythm.      Pulses: Normal pulses.   Pulmonary:      Effort: Pulmonary effort is normal. No respiratory distress.   Abdominal:      Comments: Suprapubic catheter   Musculoskeletal:        Feet:    Feet:      Comments: Right heel with blanching erythema  Bilateral feet without skin breaks  Multiple areas of scarring to bilateral feet/ankles.   Skin:     General: Skin is warm and dry.      Capillary Refill: Capillary refill takes less than 2 seconds.      Findings: Wound present.          Neurological:      General: No focal deficit present.      Mental Status: He is alert and oriented to person, place, and time. Mental status is at baseline.      Motor: Weakness (quadraplegia) present.   Psychiatric:         Mood and Affect: Mood normal.         Behavior: Behavior normal. Behavior is cooperative.       Sacrum: 6 x 5 x 4 cm with undermining throughout. Deepest at 7 o'clock 2.5 cm       Right lower buttock/posterior thigh: 7 x 11.8 x 2 cm       Left hip: 3.8 x 2.5 x 1.4 cm with undermining throughout, deepest at 5 o'clock 3.1 cm       Right heel - blanching erythema              Laboratory:  A1C:   Recent Labs   Lab 08/16/24  0830   HGBA1C 7.5*       BMP:   Recent Labs   Lab 10/09/24  0623      K 5.9*   *   CO2 20*   BUN 28.5*   CREATININE 1.54*   CALCIUM 8.4       CBC:   Recent Labs   Lab 10/09/24  0623   WBC 8.46   RBC 3.13*   HGB 8.5*   HCT 27.3*      MCV 87.2   MCH 27.2   MCHC 31.1*     CMP:   Recent Labs   Lab 10/09/24  0623   CALCIUM 8.4   ALBUMIN 2.5*      K 5.9*   CO2 20*   *   BUN 28.5*   CREATININE 1.54*   ALKPHOS 84   ALT 14   AST 8   BILITOT 0.2       CRP:   Recent Labs   Lab 10/08/24  1233   CRP  121.10*     ESR:   Recent Labs   Lab 10/08/24  1234   SEDRATE 86*     LFTs:   Recent Labs   Lab 10/09/24  0623   ALT 14   AST 8   ALKPHOS 84   BILITOT 0.2   ALBUMIN 2.5*       Microbiology Results (last 7 days)       Procedure Component Value Units Date/Time    Urine culture [[id number]]  (Abnormal) Collected: 10/08/24 1709    Order Status: Completed Specimen: Urine Updated: 10/09/24 0749     Urine Culture >/= 100,000 colonies/ml Gram-negative Rods    Blood culture #1 **CANNOT BE ORDERED STAT** [[id number]] Collected: 10/08/24 1233    Order Status: Resulted Specimen: Blood Updated: 10/08/24 1506    Blood culture #2 **CANNOT BE ORDERED STAT** [[id number]] Collected: 10/08/24 1233    Order Status: Resulted Specimen: Blood Updated: 10/08/24 1506              Recent Labs   Lab 10/08/24  1709   COLORU Light-Yellow   PHUR 8.0   PROTEINUA 1+*   BACTERIA Moderate*   NITRITE Negative   LEUKOCYTESUR 500*   UROBILINOGEN Normal       Diagnostic Results:  I have reviewed all pertinent imaging results/findings within the past 24 hours.

## 2024-10-09 NOTE — PLAN OF CARE
10/09/24 1443   Discharge Assessment   Assessment Type Discharge Planning Assessment   Confirmed/corrected address, phone number and insurance Yes   Confirmed Demographics Correct on Facesheet   Source of Information patient   Communicated SADE with patient/caregiver Date not available/Unable to determine   Reason For Admission sacral decubitus ulcer   People in Home   (24/7 sitters)   Do you expect to return to your current living situation? Yes   Do you have help at home or someone to help you manage your care at home? Yes   Who are your caregiver(s) and their phone number(s)? 24/7 sitters with [facility]   Prior to hospitilization cognitive status: Unable to Assess   Current cognitive status: Alert/Oriented   Home Accessibility wheelchair accessible   Home Layout Able to live on 1st floor   Equipment Currently Used at Home hospital bed;power chair   Readmission within 30 days? No   Patient currently being followed by outpatient case management? No   Do you currently have service(s) that help you manage your care at home? Yes   Name and Contact number of agency  wound care with [place]  and 24/7 private sitters with [facility]   Is the pt/caregiver preference to resume services with current agency Yes   Do you take prescription medications? Yes   Do you have prescription coverage? Yes   Coverage [employer], [place]   Do you have any problems affording any of your prescribed medications? No   Is the patient taking medications as prescribed? yes   Who is going to help you get home at discharge? family   How do you get to doctors appointments? family or friend will provide   Are you on dialysis? No   Do you take coumadin? No   Discharge Plan A Home Health   Discharge Plan B Home Health   DME Needed Upon Discharge  none   Discharge Plan discussed with: Patient   Transition of Care Barriers None     Completed assessment with pt at bedside, mother and home sitter present. Introduced self and explained  role as SW. Pt verb understanding to all questions asked. PCP is Jennifer Fernando and pharmacy is [address] in [place]. Pt is current with [facility]; updates sent via [employer]. Pt has 24/7 sitters with [facility].     Laurence Keith LCSW

## 2024-10-10 LAB
ANION GAP SERPL CALC-SCNC: 8 MEQ/L
BASOPHILS # BLD AUTO: 0.06 X10(3)/MCL
BASOPHILS NFR BLD AUTO: 0.9 %
BUN SERPL-MCNC: 23.2 MG/DL (ref 8.4–25.7)
CALCIUM SERPL-MCNC: 8.3 MG/DL (ref 8.4–10.2)
CHLORIDE SERPL-SCNC: 109 MMOL/L (ref 98–107)
CO2 SERPL-SCNC: 21 MMOL/L (ref 22–29)
CREAT SERPL-MCNC: 1.09 MG/DL (ref 0.73–1.18)
CREAT/UREA NIT SERPL: 21
EOSINOPHIL # BLD AUTO: 0.29 X10(3)/MCL (ref 0–0.9)
EOSINOPHIL NFR BLD AUTO: 4.1 %
ERYTHROCYTE [DISTWIDTH] IN BLOOD BY AUTOMATED COUNT: 15.7 % (ref 11.5–17)
GFR SERPLBLD CREATININE-BSD FMLA CKD-EPI: >60 ML/MIN/1.73/M2
GLUCOSE SERPL-MCNC: 183 MG/DL (ref 74–100)
HCT VFR BLD AUTO: 28.2 % (ref 42–52)
HGB BLD-MCNC: 8.8 G/DL (ref 14–18)
IMM GRANULOCYTES # BLD AUTO: 0.03 X10(3)/MCL (ref 0–0.04)
IMM GRANULOCYTES NFR BLD AUTO: 0.4 %
LYMPHOCYTES # BLD AUTO: 2.59 X10(3)/MCL (ref 0.6–4.6)
LYMPHOCYTES NFR BLD AUTO: 37.1 %
MAGNESIUM SERPL-MCNC: 2 MG/DL (ref 1.6–2.6)
MCH RBC QN AUTO: 27.1 PG (ref 27–31)
MCHC RBC AUTO-ENTMCNC: 31.2 G/DL (ref 33–36)
MCV RBC AUTO: 86.8 FL (ref 80–94)
MONOCYTES # BLD AUTO: 0.57 X10(3)/MCL (ref 0.1–1.3)
MONOCYTES NFR BLD AUTO: 8.2 %
NEUTROPHILS # BLD AUTO: 3.45 X10(3)/MCL (ref 2.1–9.2)
NEUTROPHILS NFR BLD AUTO: 49.3 %
NRBC BLD AUTO-RTO: 0 %
PHOSPHATE SERPL-MCNC: 3.2 MG/DL (ref 2.3–4.7)
PLATELET # BLD AUTO: 269 X10(3)/MCL (ref 130–400)
PMV BLD AUTO: 10.5 FL (ref 7.4–10.4)
POCT GLUCOSE: 195 MG/DL (ref 70–110)
POCT GLUCOSE: 243 MG/DL (ref 70–110)
POCT GLUCOSE: 265 MG/DL (ref 70–110)
POTASSIUM SERPL-SCNC: 4.8 MMOL/L (ref 3.5–5.1)
PREALB SERPL-MCNC: 16 MG/DL (ref 18–45)
RBC # BLD AUTO: 3.25 X10(6)/MCL (ref 4.7–6.1)
SODIUM SERPL-SCNC: 138 MMOL/L (ref 136–145)
WBC # BLD AUTO: 6.99 X10(3)/MCL (ref 4.5–11.5)

## 2024-10-10 PROCEDURE — 0T2BX0Z CHANGE DRAINAGE DEVICE IN BLADDER, EXTERNAL APPROACH: ICD-10-PCS | Performed by: UROLOGY

## 2024-10-10 PROCEDURE — 25000003 PHARM REV CODE 250: Performed by: NURSE PRACTITIONER

## 2024-10-10 PROCEDURE — 84100 ASSAY OF PHOSPHORUS: CPT | Performed by: INTERNAL MEDICINE

## 2024-10-10 PROCEDURE — 63600175 PHARM REV CODE 636 W HCPCS: Performed by: INTERNAL MEDICINE

## 2024-10-10 PROCEDURE — 25000003 PHARM REV CODE 250: Performed by: INTERNAL MEDICINE

## 2024-10-10 PROCEDURE — 80048 BASIC METABOLIC PNL TOTAL CA: CPT | Performed by: INTERNAL MEDICINE

## 2024-10-10 PROCEDURE — 63600175 PHARM REV CODE 636 W HCPCS: Performed by: NURSE PRACTITIONER

## 2024-10-10 PROCEDURE — 21400001 HC TELEMETRY ROOM

## 2024-10-10 PROCEDURE — 83735 ASSAY OF MAGNESIUM: CPT | Performed by: INTERNAL MEDICINE

## 2024-10-10 PROCEDURE — 85025 COMPLETE CBC W/AUTO DIFF WBC: CPT | Performed by: INTERNAL MEDICINE

## 2024-10-10 PROCEDURE — 36415 COLL VENOUS BLD VENIPUNCTURE: CPT | Performed by: INTERNAL MEDICINE

## 2024-10-10 PROCEDURE — 27000221 HC OXYGEN, UP TO 24 HOURS

## 2024-10-10 RX ADMIN — SODIUM BICARBONATE 650 MG TABLET 650 MG: at 09:10

## 2024-10-10 RX ADMIN — PIPERACILLIN SODIUM AND TAZOBACTAM SODIUM 4.5 G: 4; .5 INJECTION, POWDER, LYOPHILIZED, FOR SOLUTION INTRAVENOUS at 01:10

## 2024-10-10 RX ADMIN — FERROUS SULFATE TAB 325 MG (65 MG ELEMENTAL FE) 1 EACH: 325 (65 FE) TAB at 09:10

## 2024-10-10 RX ADMIN — OXYBUTYNIN CHLORIDE 5 MG: 5 TABLET ORAL at 08:10

## 2024-10-10 RX ADMIN — DOXYCYCLINE HYCLATE 100 MG: 100 TABLET, COATED ORAL at 08:10

## 2024-10-10 RX ADMIN — INSULIN GLARGINE 15 UNITS: 100 INJECTION, SOLUTION SUBCUTANEOUS at 08:10

## 2024-10-10 RX ADMIN — SODIUM BICARBONATE 650 MG TABLET 650 MG: at 05:10

## 2024-10-10 RX ADMIN — OXYBUTYNIN CHLORIDE 5 MG: 5 TABLET ORAL at 05:10

## 2024-10-10 RX ADMIN — DILTIAZEM HYDROCHLORIDE 120 MG: 120 CAPSULE, COATED, EXTENDED RELEASE ORAL at 09:10

## 2024-10-10 RX ADMIN — Medication 500 MG: at 09:10

## 2024-10-10 RX ADMIN — DOXYCYCLINE HYCLATE 100 MG: 100 TABLET, COATED ORAL at 09:10

## 2024-10-10 RX ADMIN — INSULIN ASPART 3 UNITS: 100 INJECTION, SOLUTION INTRAVENOUS; SUBCUTANEOUS at 08:10

## 2024-10-10 RX ADMIN — PIPERACILLIN SODIUM AND TAZOBACTAM SODIUM 4.5 G: 4; .5 INJECTION, POWDER, LYOPHILIZED, FOR SOLUTION INTRAVENOUS at 05:10

## 2024-10-10 RX ADMIN — PIPERACILLIN SODIUM AND TAZOBACTAM SODIUM 4.5 G: 4; .5 INJECTION, POWDER, LYOPHILIZED, FOR SOLUTION INTRAVENOUS at 10:10

## 2024-10-10 RX ADMIN — INSULIN ASPART 4 UNITS: 100 INJECTION, SOLUTION INTRAVENOUS; SUBCUTANEOUS at 05:10

## 2024-10-10 RX ADMIN — OXYBUTYNIN CHLORIDE 5 MG: 5 TABLET ORAL at 09:10

## 2024-10-10 RX ADMIN — SODIUM BICARBONATE 650 MG TABLET 650 MG: at 08:10

## 2024-10-10 RX ADMIN — ENOXAPARIN SODIUM 40 MG: 40 INJECTION SUBCUTANEOUS at 05:10

## 2024-10-10 RX ADMIN — INSULIN ASPART 2 UNITS: 100 INJECTION, SOLUTION INTRAVENOUS; SUBCUTANEOUS at 05:10
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
## 2024-10-10 NOTE — PROGRESS NOTES
UROLOGY  PROGRESS  NOTE    Antonio Galvan II 1965  [id number]  10/10/2024    Continues with leakage, mostly leaking urine from penis overnight   No improvement with Ditropan  Family at bedside    VSS, afebrile  600ml UOP overnight  WBC 6.99  H&H 8.8/28.2  BUN/Cr 23.2/1.09    Intake/Output:  No intake/output data recorded.  I/O last 3 completed shifts:  In: -   Out: 3150 [Urine:3150]     Exam:    NAD  Card: RRR  Resp: unlabored  Abd:  Round, soft, nondistended  :  SP tube site clean dry and intact without any active leakage.  Suprapubic catheter was exchanged without difficulty - clear light yellow urine draining after exchange  Extremity:  Quadriplegia, contractures    Recent Results (from the past 24 hours)   Sedimentation rate    Collection Time: 10/09/24  2:26 PM   Result Value Ref Range    Sed Rate 105 (H) 0 - 15 mm/hr   POCT glucose    Collection Time: 10/09/24  8:57 PM   Result Value Ref Range    POCT Glucose 296 (H) 70 - 110 mg/dL   POCT glucose    Collection Time: 10/10/24  5:40 AM   Result Value Ref Range    POCT Glucose 195 (H) 70 - 110 mg/dL   Basic Metabolic Panel    Collection Time: 10/10/24  6:27 AM   Result Value Ref Range    Sodium 138 136 - 145 mmol/L    Potassium 4.8 3.5 - 5.1 mmol/L    Chloride 109 (H) 98 - 107 mmol/L    CO2 21 (L) 22 - 29 mmol/L    Glucose 183 (H) 74 - 100 mg/dL    Blood Urea Nitrogen 23.2 8.4 - 25.7 mg/dL    Creatinine 1.09 0.73 - 1.18 mg/dL    BUN/Creatinine Ratio 21     Calcium 8.3 (L) 8.4 - 10.2 mg/dL    Anion Gap 8.0 mEq/L    eGFR >60 mL/min/1.73/m2   Magnesium    Collection Time: 10/10/24  6:27 AM   Result Value Ref Range    Magnesium Level 2.00 1.60 - 2.60 mg/dL   Phosphorus    Collection Time: 10/10/24  6:27 AM   Result Value Ref Range    Phosphorus Level 3.2 2.3 - 4.7 mg/dL   CBC with Differential    Collection Time: 10/10/24  6:27 AM   Result Value Ref Range    WBC 6.99 4.50 - 11.50 x10(3)/mcL    RBC 3.25 (L) 4.70 - 6.10 x10(6)/mcL    Hgb 8.8 (L) 14.0 - 18.0  g/dL    Hct 28.2 (L) 42.0 - 52.0 %    MCV 86.8 80.0 - 94.0 fL    MCH 27.1 27.0 - 31.0 pg    MCHC 31.2 (L) 33.0 - 36.0 g/dL    RDW 15.7 11.5 - 17.0 %    Platelet 269 130 - 400 x10(3)/mcL    MPV 10.5 (H) 7.4 - 10.4 fL    Neut % 49.3 %    Lymph % 37.1 %    Mono % 8.2 %    Eos % 4.1 %    Basophil % 0.9 %    Lymph # 2.59 0.6 - 4.6 x10(3)/mcL    Neut # 3.45 2.1 - 9.2 x10(3)/mcL    Mono # 0.57 0.1 - 1.3 x10(3)/mcL    Eos # 0.29 0 - 0.9 x10(3)/mcL    Baso # 0.06 <=0.2 x10(3)/mcL    IG# 0.03 0 - 0.04 x10(3)/mcL    IG% 0.4 %    NRBC% 0.0 %       Assessment:  Quadriplegia with Neurogenic bladder with chronic suprapubic catheter, leaking around catheter for 1-2 weeks  -SPT exchanged today  -started ditropan  -colonization of urine s/t chronic SPT - no signs or symptoms of UTI.      Infected sacral decubitus, chronic osteomyelitis      Plan:  -SPT exchanged today without difficulty  -Continue with exchanges q2wk after discharge  -Continue ditropan and resume Myrbetriq on d/c (not available in hospital)  -Will check in on patient tomorrow      Lynsey Hayes NP
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
## 2024-10-11 LAB
ALBUMIN SERPL-MCNC: 2.4 G/DL (ref 3.5–5)
ALBUMIN/GLOB SERPL: 0.6 RATIO (ref 1.1–2)
ALP SERPL-CCNC: 77 UNIT/L (ref 40–150)
ALT SERPL-CCNC: 12 UNIT/L (ref 0–55)
ANION GAP SERPL CALC-SCNC: 9 MEQ/L
AST SERPL-CCNC: 7 UNIT/L (ref 5–34)
BACTERIA UR CULT: ABNORMAL
BASOPHILS # BLD AUTO: 0.06 X10(3)/MCL
BASOPHILS NFR BLD AUTO: 0.8 %
BILIRUB SERPL-MCNC: 0.2 MG/DL
BUN SERPL-MCNC: 23.2 MG/DL (ref 8.4–25.7)
CALCIUM SERPL-MCNC: 8.2 MG/DL (ref 8.4–10.2)
CHLORIDE SERPL-SCNC: 108 MMOL/L (ref 98–107)
CK SERPL-CCNC: 61 U/L (ref 30–200)
CO2 SERPL-SCNC: 21 MMOL/L (ref 22–29)
CREAT SERPL-MCNC: 1.15 MG/DL (ref 0.73–1.18)
CREAT/UREA NIT SERPL: 20
EOSINOPHIL # BLD AUTO: 0.28 X10(3)/MCL (ref 0–0.9)
EOSINOPHIL NFR BLD AUTO: 3.9 %
ERYTHROCYTE [DISTWIDTH] IN BLOOD BY AUTOMATED COUNT: 15.4 % (ref 11.5–17)
GFR SERPLBLD CREATININE-BSD FMLA CKD-EPI: >60 ML/MIN/1.73/M2
GLOBULIN SER-MCNC: 3.7 GM/DL (ref 2.4–3.5)
GLUCOSE SERPL-MCNC: 198 MG/DL (ref 74–100)
HCT VFR BLD AUTO: 26.6 % (ref 42–52)
HGB BLD-MCNC: 8.3 G/DL (ref 14–18)
IMM GRANULOCYTES # BLD AUTO: 0.03 X10(3)/MCL (ref 0–0.04)
IMM GRANULOCYTES NFR BLD AUTO: 0.4 %
LYMPHOCYTES # BLD AUTO: 2.56 X10(3)/MCL (ref 0.6–4.6)
LYMPHOCYTES NFR BLD AUTO: 35.5 %
MCH RBC QN AUTO: 27.7 PG (ref 27–31)
MCHC RBC AUTO-ENTMCNC: 31.2 G/DL (ref 33–36)
MCV RBC AUTO: 88.7 FL (ref 80–94)
MONOCYTES # BLD AUTO: 0.58 X10(3)/MCL (ref 0.1–1.3)
MONOCYTES NFR BLD AUTO: 8 %
NEUTROPHILS # BLD AUTO: 3.7 X10(3)/MCL (ref 2.1–9.2)
NEUTROPHILS NFR BLD AUTO: 51.4 %
NRBC BLD AUTO-RTO: 0 %
PLATELET # BLD AUTO: 253 X10(3)/MCL (ref 130–400)
PMV BLD AUTO: 10.1 FL (ref 7.4–10.4)
POCT GLUCOSE: 199 MG/DL (ref 70–110)
POCT GLUCOSE: 203 MG/DL (ref 70–110)
POCT GLUCOSE: 231 MG/DL (ref 70–110)
POTASSIUM SERPL-SCNC: 4.5 MMOL/L (ref 3.5–5.1)
PROT SERPL-MCNC: 6.1 GM/DL (ref 6.4–8.3)
RBC # BLD AUTO: 3 X10(6)/MCL (ref 4.7–6.1)
SODIUM SERPL-SCNC: 138 MMOL/L (ref 136–145)
WBC # BLD AUTO: 7.21 X10(3)/MCL (ref 4.5–11.5)

## 2024-10-11 PROCEDURE — 85025 COMPLETE CBC W/AUTO DIFF WBC: CPT | Performed by: NURSE PRACTITIONER

## 2024-10-11 PROCEDURE — 21400001 HC TELEMETRY ROOM

## 2024-10-11 PROCEDURE — 99900031 HC PATIENT EDUCATION (STAT)

## 2024-10-11 PROCEDURE — 80053 COMPREHEN METABOLIC PANEL: CPT | Performed by: NURSE PRACTITIONER

## 2024-10-11 PROCEDURE — 87184 SC STD DISK METHOD PER PLATE: CPT | Performed by: NURSE PRACTITIONER

## 2024-10-11 PROCEDURE — 87077 CULTURE AEROBIC IDENTIFY: CPT | Performed by: NURSE PRACTITIONER

## 2024-10-11 PROCEDURE — 97606 NEG PRS WND THER DME>50 SQCM: CPT

## 2024-10-11 PROCEDURE — 63600175 PHARM REV CODE 636 W HCPCS: Performed by: NURSE PRACTITIONER

## 2024-10-11 PROCEDURE — 87186 SC STD MICRODIL/AGAR DIL: CPT | Performed by: NURSE PRACTITIONER

## 2024-10-11 PROCEDURE — 25000003 PHARM REV CODE 250: Performed by: NURSE PRACTITIONER

## 2024-10-11 PROCEDURE — 87075 CULTR BACTERIA EXCEPT BLOOD: CPT | Performed by: NURSE PRACTITIONER

## 2024-10-11 PROCEDURE — 25000003 PHARM REV CODE 250: Performed by: INTERNAL MEDICINE

## 2024-10-11 PROCEDURE — 82550 ASSAY OF CK (CPK): CPT | Performed by: NURSE PRACTITIONER

## 2024-10-11 PROCEDURE — 36415 COLL VENOUS BLD VENIPUNCTURE: CPT | Performed by: NURSE PRACTITIONER

## 2024-10-11 PROCEDURE — 99900035 HC TECH TIME PER 15 MIN (STAT)

## 2024-10-11 PROCEDURE — 63600175 PHARM REV CODE 636 W HCPCS: Performed by: INTERNAL MEDICINE

## 2024-10-11 PROCEDURE — 27000221 HC OXYGEN, UP TO 24 HOURS

## 2024-10-11 RX ORDER — MIRABEGRON 25 MG/1
25 TABLET, FILM COATED, EXTENDED RELEASE ORAL DAILY
Qty: 30 TABLET | Refills: 11 | Status: SHIPPED | OUTPATIENT
Start: 2024-10-11 | End: 2025-10-11

## 2024-10-11 RX ORDER — OXYBUTYNIN CHLORIDE 10 MG/1
10 TABLET, EXTENDED RELEASE ORAL DAILY
Qty: 30 TABLET | Refills: 11 | Status: SHIPPED | OUTPATIENT
Start: 2024-10-11 | End: 2025-10-11

## 2024-10-11 RX ADMIN — ENOXAPARIN SODIUM 40 MG: 40 INJECTION SUBCUTANEOUS at 04:10

## 2024-10-11 RX ADMIN — INSULIN ASPART 4 UNITS: 100 INJECTION, SOLUTION INTRAVENOUS; SUBCUTANEOUS at 05:10

## 2024-10-11 RX ADMIN — OXYBUTYNIN CHLORIDE 5 MG: 5 TABLET ORAL at 08:10

## 2024-10-11 RX ADMIN — HYDROCODONE BITARTRATE AND ACETAMINOPHEN 1 TABLET: 5; 325 TABLET ORAL at 08:10

## 2024-10-11 RX ADMIN — FERROUS SULFATE TAB 325 MG (65 MG ELEMENTAL FE) 1 EACH: 325 (65 FE) TAB at 08:10

## 2024-10-11 RX ADMIN — INSULIN ASPART 4 UNITS: 100 INJECTION, SOLUTION INTRAVENOUS; SUBCUTANEOUS at 04:10

## 2024-10-11 RX ADMIN — COLLAGENASE SANTYL: 250 OINTMENT TOPICAL at 08:10

## 2024-10-11 RX ADMIN — DILTIAZEM HYDROCHLORIDE 120 MG: 120 CAPSULE, COATED, EXTENDED RELEASE ORAL at 08:10

## 2024-10-11 RX ADMIN — PIPERACILLIN SODIUM AND TAZOBACTAM SODIUM 4.5 G: 4; .5 INJECTION, POWDER, LYOPHILIZED, FOR SOLUTION INTRAVENOUS at 01:10

## 2024-10-11 RX ADMIN — MUPIROCIN: 20 OINTMENT TOPICAL at 08:10

## 2024-10-11 RX ADMIN — INSULIN GLARGINE 15 UNITS: 100 INJECTION, SOLUTION SUBCUTANEOUS at 08:10

## 2024-10-11 RX ADMIN — INSULIN ASPART 4 UNITS: 100 INJECTION, SOLUTION INTRAVENOUS; SUBCUTANEOUS at 10:10

## 2024-10-11 RX ADMIN — DAPTOMYCIN 835 MG: 500 INJECTION, POWDER, LYOPHILIZED, FOR SOLUTION INTRAVENOUS at 02:10

## 2024-10-11 RX ADMIN — DOXYCYCLINE HYCLATE 100 MG: 100 TABLET, COATED ORAL at 08:10

## 2024-10-11 RX ADMIN — PIPERACILLIN SODIUM AND TAZOBACTAM SODIUM 4.5 G: 4; .5 INJECTION, POWDER, LYOPHILIZED, FOR SOLUTION INTRAVENOUS at 04:10

## 2024-10-11 RX ADMIN — ACETAMINOPHEN 1000 MG: 500 TABLET, FILM COATED ORAL at 04:10

## 2024-10-11 RX ADMIN — OXYBUTYNIN CHLORIDE 5 MG: 5 TABLET ORAL at 04:10

## 2024-10-11 RX ADMIN — INSULIN ASPART 2 UNITS: 100 INJECTION, SOLUTION INTRAVENOUS; SUBCUTANEOUS at 12:10

## 2024-10-11 RX ADMIN — Medication 500 MG: at 08:10

## 2024-10-11 RX ADMIN — PIPERACILLIN SODIUM AND TAZOBACTAM SODIUM 4.5 G: 4; .5 INJECTION, POWDER, LYOPHILIZED, FOR SOLUTION INTRAVENOUS at 08:10
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
## 2024-10-11 NOTE — PROGRESS NOTES
[facility]  Wound Care    Patient Name:  Antonio Galvan II   MRN:  [id number]  Date: 10/11/2024  Diagnosis: <principal problem not specified>    History:     Past Medical History:   Diagnosis Date    A-fib     Cardiac arrest     7/2022    Chronic skin ulcer     DM (diabetes mellitus)     Infected decubitus ulcer     Lymphedema of leg     Neurogenic bladder     Obesity, unspecified     Pressure ulcer of heel     Pressure ulcer of right foot, stage 3     Pressure ulcer of right ischium     Quadriplegia     Quadriplegic spinal paralysis        Social History     Socioeconomic History    Marital status: Single   Tobacco Use    Smoking status: Never    Smokeless tobacco: Never   Substance and Sexual Activity    Alcohol use: Never    Drug use: Never    Sexual activity: Not Currently     Social Drivers of Health     Financial Resource Strain: Low Risk  (2/27/2024)    Overall Financial Resource Strain (CARDIA)     Difficulty of Paying Living Expenses: Not hard at all   Food Insecurity: No Food Insecurity (2/27/2024)    Hunger Vital Sign     Worried About Running Out of Food in the Last Year: Never true     Ran Out of Food in the Last Year: Never true   Transportation Needs: No Transportation Needs (2/27/2024)    PRAPARE - Transportation     Lack of Transportation (Medical): No     Lack of Transportation (Non-Medical): No   Physical Activity: Inactive (2/27/2024)    Exercise Vital Sign     Days of Exercise per Week: 0 days     Minutes of Exercise per Session: 0 min   Stress: No Stress Concern Present (2/27/2024)    [employer] - Occupational Stress Questionnaire     Feeling of Stress : Only a little   Recent Concern: Stress - Stress Concern Present (2/24/2024)    [employer] - Occupational Stress Questionnaire     Feeling of Stress : To some extent   Housing Stability: Low Risk  (2/27/2024)    Housing Stability Vital Sign     Unable to  Pay for Housing in the Last Year: No     Number of Places Lived in the Last Year: 1     Unstable Housing in the Last Year: No       Precautions:     Allergies as of 10/08/2024 - Reviewed 10/08/2024   Allergen Reaction Noted    Metoprolol Other (See Comments) 03/20/2024       [facility] Assessment Details/Treatment      10/11/24 1114   WOCN Assessment   Visit Date 10/11/24   Visit Time 1114   Consult Type New   WOCN Speciality Wound   WOCN List wound vac   Wound pressure   Number of Wounds 3   Procedure wound vac   Intervention chart review;assessed;applied   Teaching on-going        Altered Skin Integrity 10/11/24 1241 Sacral spine Full thickness tissue loss with exposed bone, tendon, or muscle. Often includes undermining and tunneling. May extend into muscle and/or supporting structures.   Date First Assessed/Time First Assessed: 10/11/24 1241   Altered Skin Integrity Present on Admission - Did Patient arrive to the hospital with altered skin?: yes  Location: Sacral spine  Description of Altered Skin Integrity: Full thickness tissue los...   Wound Image    Dressing Appearance Intact;Moist drainage   Drainage Amount Moderate   Drainage Characteristics/Odor Serosanguineous   Appearance Red;Yellow;Moist   Tissue loss description Full thickness   Black (%), Wound Tissue Color 0 %   Red (%), Wound Tissue Color 80 %   Yellow (%), Wound Tissue Color 20 %   Periwound Area Pale white;Pink;Dry   Wound Edges Defined   Wound Length (cm) 6 cm   Wound Width (cm) 5 cm   Wound Depth (cm) 4 cm   Wound Volume (cm^3) 120 cm^3   Wound Surface Area (cm^2) 30 cm^2   Undermining (depth (cm)/location) 2.5cm at 7oclock   Care Cleansed with:;Antimicrobial agent;Wound cleanser;Other (see comments)  (Vashe)   Dressing Applied;Foam;Transparent film;Other (comment)  (NPWT)        Wound 10/11/24 Pressure Injury Right Ischial tuberosity   Date First Assessed: 10/11/24   Present on Original Admission: Yes  Primary Wound Type: Pressure Injury  Side: Right   Location: Ischial tuberosity   Wound Image    Dressing Appearance Intact;Moist drainage   Drainage Amount Moderate   Drainage Characteristics/Odor Serosanguineous   Appearance Pink;Red;Yellow;Moist   Tissue loss description Full thickness   Black (%), Wound Tissue Color 0 %   Red (%), Wound Tissue Color 70 %   Yellow (%), Wound Tissue Color 30 %   Periwound Area Pink;Dry;Pale white   Wound Edges Defined   Wound Length (cm) 7 cm   Wound Width (cm) 11.8 cm   Wound Depth (cm) 2 cm   Wound Volume (cm^3) 165.2 cm^3   Wound Surface Area (cm^2) 82.6 cm^2   Care Cleansed with:;Antimicrobial agent;Wound cleanser;Other (see comments)  (Vashe)   Dressing Applied;Foam;Transparent film;Other (comment)  (NPWT)        Wound 10/11/24 Pressure Injury Left Ischial tuberosity   Date First Assessed: 10/11/24   Present on Original Admission: Yes  Primary Wound Type: Pressure Injury  Side: Left  Location: Ischial tuberosity   Wound Image    Dressing Appearance Intact;Moist drainage   Drainage Amount Small   Drainage Characteristics/Odor Serous   Appearance Pink;Red;Yellow;Moist   Tissue loss description Full thickness   Black (%), Wound Tissue Color 0 %   Red (%), Wound Tissue Color 50 %   Yellow (%), Wound Tissue Color 50 %   Periwound Area Intact;Dry;Pink   Wound Edges Defined   Wound Length (cm) 3.8 cm   Wound Width (cm) 2.5 cm   Wound Depth (cm) 1.4 cm   Wound Volume (cm^3) 13.3 cm^3   Wound Surface Area (cm^2) 9.5 cm^2   Undermining (depth (cm)/location) 3.1 at 5oclock   Care Cleansed with:;Antimicrobial agent;Wound cleanser;Other (see comments)  (Vashe)   Dressing Applied;Foam;Transparent film;Other (comment)  (NPWT)        Negative Pressure Wound Therapy  10/11/24    Placement Date: 10/11/24   Side: (c)   Location: Sacral Spine   NPWT Type Vacuum Therapy   Therapy Setting NPWT Continuous therapy   Pressure Setting NPWT 125 mmHg   Sponges Inserted NPWT White;Black  (1 black and 1 white to each wound)   WOCN consulted for wound vac  placement. Discussed plan of care with nurse Randall prior to visiting the patient. Removed dressings, cleansed area and applied wound vac dressing. Patient tolerated well. Vac on and intact with excellent seal noted at 125mmHg. Next wound vac change due Tuesday 10/15/2024. Answered all questions sanjuana the satisfaction of the staff and family. Patient on Envella sand bed with preventative measures in place. Will follow up.   10/11/2024

## 2024-10-11 NOTE — PROGRESS NOTES
UROLOGY  PROGRESS  NOTE    Antonio Galvan II 1965  [id number]  10/11/2024    Minimal leakage since catheter exchanged    VSS, afebrile  1700ml uop  No labs this morning      Intake/Output:  No intake/output data recorded.  I/O last 3 completed shifts:  In: 1120 [P.O.:1120]  Out: 2600 [Urine:2600]     Exam:    NAD  Card: RRR  Resp: unlabored  Abd:  Round, soft, nondistended  :  SP tube site clean dry and intact, clear yellow urine draining to  bag  Extremity:  Quadriplegia, contractures    Recent Results (from the past 24 hours)   POCT glucose    Collection Time: 10/10/24  5:13 PM   Result Value Ref Range    POCT Glucose 243 (H) 70 - 110 mg/dL   POCT glucose    Collection Time: 10/10/24  8:41 PM   Result Value Ref Range    POCT Glucose 265 (H) 70 - 110 mg/dL   POCT glucose    Collection Time: 10/11/24  5:42 AM   Result Value Ref Range    POCT Glucose 203 (H) 70 - 110 mg/dL       Assessment:  Quadriplegia with Neurogenic bladder with chronic suprapubic catheter, leaking around catheter for 1-2 weeks  -SPT exchanged 10/10  -started ditropan  -colonization of urine s/t chronic SPT - no signs or symptoms of UTI.      Infected sacral decubitus, chronic osteomyelitis      Plan:  -Continue with exchanges q2wk after discharge  -Continue ditropan and resume Myrbetriq on d/c (not available in hospital)  -Urology is signing off. Please call as needed with any issues.       Lynsey Hayes NP

## 2024-10-12 PROCEDURE — 63600175 PHARM REV CODE 636 W HCPCS: Performed by: INTERNAL MEDICINE

## 2024-10-12 PROCEDURE — 25000003 PHARM REV CODE 250: Performed by: NURSE PRACTITIONER

## 2024-10-12 PROCEDURE — 21400001 HC TELEMETRY ROOM

## 2024-10-12 PROCEDURE — 27000221 HC OXYGEN, UP TO 24 HOURS

## 2024-10-12 PROCEDURE — 63600175 PHARM REV CODE 636 W HCPCS: Performed by: NURSE PRACTITIONER

## 2024-10-12 PROCEDURE — 25000003 PHARM REV CODE 250: Performed by: INTERNAL MEDICINE

## 2024-10-12 RX ADMIN — MUPIROCIN: 20 OINTMENT TOPICAL at 09:10

## 2024-10-12 RX ADMIN — PIPERACILLIN SODIUM AND TAZOBACTAM SODIUM 4.5 G: 4; .5 INJECTION, POWDER, LYOPHILIZED, FOR SOLUTION INTRAVENOUS at 09:10

## 2024-10-12 RX ADMIN — HYDROCODONE BITARTRATE AND ACETAMINOPHEN 1 TABLET: 5; 325 TABLET ORAL at 07:10

## 2024-10-12 RX ADMIN — OXYBUTYNIN CHLORIDE 5 MG: 5 TABLET ORAL at 08:10

## 2024-10-12 RX ADMIN — INSULIN GLARGINE 15 UNITS: 100 INJECTION, SOLUTION SUBCUTANEOUS at 08:10

## 2024-10-12 RX ADMIN — OXYBUTYNIN CHLORIDE 5 MG: 5 TABLET ORAL at 02:10

## 2024-10-12 RX ADMIN — ENOXAPARIN SODIUM 40 MG: 40 INJECTION SUBCUTANEOUS at 05:10

## 2024-10-12 RX ADMIN — FERROUS SULFATE TAB 325 MG (65 MG ELEMENTAL FE) 1 EACH: 325 (65 FE) TAB at 09:10

## 2024-10-12 RX ADMIN — DAPTOMYCIN 835 MG: 500 INJECTION, POWDER, LYOPHILIZED, FOR SOLUTION INTRAVENOUS at 02:10

## 2024-10-12 RX ADMIN — PIPERACILLIN SODIUM AND TAZOBACTAM SODIUM 4.5 G: 4; .5 INJECTION, POWDER, LYOPHILIZED, FOR SOLUTION INTRAVENOUS at 02:10

## 2024-10-12 RX ADMIN — OXYBUTYNIN CHLORIDE 5 MG: 5 TABLET ORAL at 09:10

## 2024-10-12 RX ADMIN — INSULIN ASPART 3 UNITS: 100 INJECTION, SOLUTION INTRAVENOUS; SUBCUTANEOUS at 10:10

## 2024-10-12 RX ADMIN — PIPERACILLIN SODIUM AND TAZOBACTAM SODIUM 4.5 G: 4; .5 INJECTION, POWDER, LYOPHILIZED, FOR SOLUTION INTRAVENOUS at 05:10

## 2024-10-12 RX ADMIN — INSULIN ASPART 6 UNITS: 100 INJECTION, SOLUTION INTRAVENOUS; SUBCUTANEOUS at 05:10

## 2024-10-12 RX ADMIN — INSULIN ASPART 6 UNITS: 100 INJECTION, SOLUTION INTRAVENOUS; SUBCUTANEOUS at 11:10

## 2024-10-12 RX ADMIN — Medication 500 MG: at 09:10

## 2024-10-12 RX ADMIN — DILTIAZEM HYDROCHLORIDE 120 MG: 120 CAPSULE, COATED, EXTENDED RELEASE ORAL at 09:10
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
## 2024-10-12 NOTE — PLAN OF CARE
Problem: Adult Inpatient Plan of Care  Goal: Optimal Comfort and Wellbeing  Outcome: Progressing  Intervention: Monitor Pain and Promote Comfort  Flowsheets (Taken 10/12/2024 1130)  Pain Management Interventions:   around-the-clock dosing utilized   medication offered   relaxation techniques promoted   quiet environment facilitated   pillow support provided   position adjusted   pain management plan reviewed with patient/caregiver  Intervention: Provide Person-Centered Care  Flowsheets (Taken 10/12/2024 1130)  Trust Relationship/Rapport:   care explained   questions encouraged   choices provided   reassurance provided   empathic listening provided   emotional support provided   thoughts/feelings acknowledged   questions answered     Problem: Diabetes Comorbidity  Goal: Blood Glucose Level Within Targeted Range  Outcome: Progressing  Intervention: Monitor and Manage Glycemia  Flowsheets (Taken 10/12/2024 1130)  Glycemic Management: blood glucose monitored     Problem: Wound  Goal: Optimal Pain Control and Function  Outcome: Progressing  Intervention: Prevent or Manage Pain  Flowsheets (Taken 10/12/2024 1130)  Sleep/Rest Enhancement:   regular sleep/rest pattern promoted   relaxation techniques promoted   therapeutic touch utilized  Pain Management Interventions:   around-the-clock dosing utilized   medication offered   relaxation techniques promoted   quiet environment facilitated   pillow support provided   position adjusted   pain management plan reviewed with patient/caregiver

## 2024-10-12 NOTE — PROGRESS NOTES
Seen by ID, full detailed consult to follow    Impression  SIRS  Sacral wound infection with clinical osteomyelitis  GISSEL  ESBL Proteus complicated UTI   DM Type II  Afib  Quadriplegia  Neurogenic bladder / Suprapubic catheter  Anemia    See orders

## 2024-10-13 LAB
BACTERIA BLD CULT: NORMAL
BACTERIA BLD CULT: NORMAL
POCT GLUCOSE: 230 MG/DL (ref 70–110)

## 2024-10-13 PROCEDURE — 63600175 PHARM REV CODE 636 W HCPCS: Performed by: NURSE PRACTITIONER

## 2024-10-13 PROCEDURE — 25000003 PHARM REV CODE 250: Performed by: NURSE PRACTITIONER

## 2024-10-13 PROCEDURE — 25000242 PHARM REV CODE 250 ALT 637 W/ HCPCS: Performed by: STUDENT IN AN ORGANIZED HEALTH CARE EDUCATION/TRAINING PROGRAM

## 2024-10-13 PROCEDURE — 63600175 PHARM REV CODE 636 W HCPCS: Performed by: INTERNAL MEDICINE

## 2024-10-13 PROCEDURE — 25000003 PHARM REV CODE 250: Performed by: INTERNAL MEDICINE

## 2024-10-13 PROCEDURE — 21400001 HC TELEMETRY ROOM

## 2024-10-13 RX ORDER — FLUTICASONE PROPIONATE 50 MCG
2 SPRAY, SUSPENSION (ML) NASAL DAILY
Status: DISCONTINUED | OUTPATIENT
Start: 2024-10-13 | End: 2024-10-15 | Stop reason: HOSPADM

## 2024-10-13 RX ADMIN — FLUTICASONE PROPIONATE 100 MCG: 50 SPRAY, METERED NASAL at 03:10

## 2024-10-13 RX ADMIN — MUPIROCIN: 20 OINTMENT TOPICAL at 08:10

## 2024-10-13 RX ADMIN — INSULIN ASPART 2 UNITS: 100 INJECTION, SOLUTION INTRAVENOUS; SUBCUTANEOUS at 10:10

## 2024-10-13 RX ADMIN — PIPERACILLIN SODIUM AND TAZOBACTAM SODIUM 4.5 G: 4; .5 INJECTION, POWDER, LYOPHILIZED, FOR SOLUTION INTRAVENOUS at 02:10

## 2024-10-13 RX ADMIN — DILTIAZEM HYDROCHLORIDE 120 MG: 120 CAPSULE, COATED, EXTENDED RELEASE ORAL at 08:10

## 2024-10-13 RX ADMIN — OXYBUTYNIN CHLORIDE 5 MG: 5 TABLET ORAL at 03:10

## 2024-10-13 RX ADMIN — ENOXAPARIN SODIUM 40 MG: 40 INJECTION SUBCUTANEOUS at 05:10

## 2024-10-13 RX ADMIN — INSULIN ASPART 6 UNITS: 100 INJECTION, SOLUTION INTRAVENOUS; SUBCUTANEOUS at 12:10

## 2024-10-13 RX ADMIN — OXYBUTYNIN CHLORIDE 5 MG: 5 TABLET ORAL at 09:10

## 2024-10-13 RX ADMIN — DAPTOMYCIN 835 MG: 500 INJECTION, POWDER, LYOPHILIZED, FOR SOLUTION INTRAVENOUS at 01:10

## 2024-10-13 RX ADMIN — ACETAMINOPHEN 1000 MG: 500 TABLET, FILM COATED ORAL at 09:10

## 2024-10-13 RX ADMIN — INSULIN GLARGINE 15 UNITS: 100 INJECTION, SOLUTION SUBCUTANEOUS at 09:10

## 2024-10-13 RX ADMIN — FERROUS SULFATE TAB 325 MG (65 MG ELEMENTAL FE) 1 EACH: 325 (65 FE) TAB at 08:10

## 2024-10-13 RX ADMIN — OXYBUTYNIN CHLORIDE 5 MG: 5 TABLET ORAL at 08:10

## 2024-10-13 RX ADMIN — PIPERACILLIN SODIUM AND TAZOBACTAM SODIUM 4.5 G: 4; .5 INJECTION, POWDER, LYOPHILIZED, FOR SOLUTION INTRAVENOUS at 10:10

## 2024-10-13 RX ADMIN — INSULIN ASPART 4 UNITS: 100 INJECTION, SOLUTION INTRAVENOUS; SUBCUTANEOUS at 05:10

## 2024-10-13 RX ADMIN — PIPERACILLIN SODIUM AND TAZOBACTAM SODIUM 4.5 G: 4; .5 INJECTION, POWDER, LYOPHILIZED, FOR SOLUTION INTRAVENOUS at 05:10

## 2024-10-13 RX ADMIN — HYDROCODONE BITARTRATE AND ACETAMINOPHEN 1 TABLET: 5; 325 TABLET ORAL at 08:10

## 2024-10-13 RX ADMIN — Medication 500 MG: at 08:10

## 2024-10-13 NOTE — PLAN OF CARE
Problem: Adult Inpatient Plan of Care  Goal: Optimal Comfort and Wellbeing  Outcome: Progressing  Intervention: Monitor Pain and Promote Comfort  Flowsheets (Taken 10/13/2024 1037)  Pain Management Interventions:   around-the-clock dosing utilized   pain management plan reviewed with patient/caregiver   quiet environment facilitated   relaxation techniques promoted   medication offered  Intervention: Provide Person-Centered Care  Flowsheets (Taken 10/13/2024 1037)  Trust Relationship/Rapport:   questions encouraged   care explained   choices provided   reassurance provided   emotional support provided   empathic listening provided   thoughts/feelings acknowledged   questions answered     Problem: Diabetes Comorbidity  Goal: Blood Glucose Level Within Targeted Range  Outcome: Progressing  Intervention: Monitor and Manage Glycemia  Flowsheets (Taken 10/13/2024 1037)  Glycemic Management: blood glucose monitored     Problem: Wound  Goal: Optimal Pain Control and Function  Outcome: Progressing  Intervention: Prevent or Manage Pain  Flowsheets (Taken 10/13/2024 1037)  Sleep/Rest Enhancement:   regular sleep/rest pattern promoted   relaxation techniques promoted  Pain Management Interventions:   around-the-clock dosing utilized   pain management plan reviewed with patient/caregiver   quiet environment facilitated   relaxation techniques promoted   medication offered  Goal: Optimal Wound Healing  Outcome: Progressing  Intervention: Promote Wound Healing  Flowsheets (Taken 10/13/2024 1037)  Sleep/Rest Enhancement:   regular sleep/rest pattern promoted   relaxation techniques promoted
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
## 2024-10-14 LAB
ALBUMIN SERPL-MCNC: 2.4 G/DL (ref 3.5–5)
ALBUMIN/GLOB SERPL: 0.6 RATIO (ref 1.1–2)
ALP SERPL-CCNC: 74 UNIT/L (ref 40–150)
ALT SERPL-CCNC: 11 UNIT/L (ref 0–55)
ANION GAP SERPL CALC-SCNC: 7 MEQ/L
AST SERPL-CCNC: 9 UNIT/L (ref 5–34)
BACTERIA SPEC ANAEROBE CULT: NORMAL
BACTERIA WND CULT: ABNORMAL
BASOPHILS # BLD AUTO: 0.04 X10(3)/MCL
BASOPHILS NFR BLD AUTO: 0.6 %
BILIRUB SERPL-MCNC: 0.2 MG/DL
BUN SERPL-MCNC: 22.1 MG/DL (ref 8.4–25.7)
CALCIUM SERPL-MCNC: 8.6 MG/DL (ref 8.4–10.2)
CHLORIDE SERPL-SCNC: 109 MMOL/L (ref 98–107)
CO2 SERPL-SCNC: 23 MMOL/L (ref 22–29)
COLOR STL: NORMAL
CONSISTENCY STL: NORMAL
CREAT SERPL-MCNC: 1.15 MG/DL (ref 0.73–1.18)
CREAT/UREA NIT SERPL: 19
EOSINOPHIL # BLD AUTO: 0.19 X10(3)/MCL (ref 0–0.9)
EOSINOPHIL NFR BLD AUTO: 2.7 %
ERYTHROCYTE [DISTWIDTH] IN BLOOD BY AUTOMATED COUNT: 15.7 % (ref 11.5–17)
GFR SERPLBLD CREATININE-BSD FMLA CKD-EPI: >60 ML/MIN/1.73/M2
GLOBULIN SER-MCNC: 4.1 GM/DL (ref 2.4–3.5)
GLUCOSE SERPL-MCNC: 299 MG/DL (ref 74–100)
HCT VFR BLD AUTO: 28 % (ref 42–52)
HEMOCCULT SP3 STL QL: NEGATIVE
HGB BLD-MCNC: 8.8 G/DL (ref 14–18)
IMM GRANULOCYTES # BLD AUTO: 0.05 X10(3)/MCL (ref 0–0.04)
IMM GRANULOCYTES NFR BLD AUTO: 0.7 %
LYMPHOCYTES # BLD AUTO: 2.05 X10(3)/MCL (ref 0.6–4.6)
LYMPHOCYTES NFR BLD AUTO: 28.7 %
MCH RBC QN AUTO: 27.8 PG (ref 27–31)
MCHC RBC AUTO-ENTMCNC: 31.4 G/DL (ref 33–36)
MCV RBC AUTO: 88.6 FL (ref 80–94)
MONOCYTES # BLD AUTO: 0.4 X10(3)/MCL (ref 0.1–1.3)
MONOCYTES NFR BLD AUTO: 5.6 %
NEUTROPHILS # BLD AUTO: 4.41 X10(3)/MCL (ref 2.1–9.2)
NEUTROPHILS NFR BLD AUTO: 61.7 %
NRBC BLD AUTO-RTO: 0 %
PLATELET # BLD AUTO: 237 X10(3)/MCL (ref 130–400)
PMV BLD AUTO: 10.4 FL (ref 7.4–10.4)
POCT GLUCOSE: 263 MG/DL (ref 70–110)
POCT GLUCOSE: 302 MG/DL (ref 70–110)
POTASSIUM SERPL-SCNC: 4.7 MMOL/L (ref 3.5–5.1)
PROT SERPL-MCNC: 6.5 GM/DL (ref 6.4–8.3)
RBC # BLD AUTO: 3.16 X10(6)/MCL (ref 4.7–6.1)
SODIUM SERPL-SCNC: 139 MMOL/L (ref 136–145)
WBC # BLD AUTO: 7.14 X10(3)/MCL (ref 4.5–11.5)

## 2024-10-14 PROCEDURE — 36415 COLL VENOUS BLD VENIPUNCTURE: CPT | Performed by: STUDENT IN AN ORGANIZED HEALTH CARE EDUCATION/TRAINING PROGRAM

## 2024-10-14 PROCEDURE — 82272 OCCULT BLD FECES 1-3 TESTS: CPT | Performed by: INTERNAL MEDICINE

## 2024-10-14 PROCEDURE — 63600175 PHARM REV CODE 636 W HCPCS: Performed by: NURSE PRACTITIONER

## 2024-10-14 PROCEDURE — 25000003 PHARM REV CODE 250: Performed by: NURSE PRACTITIONER

## 2024-10-14 PROCEDURE — 25000003 PHARM REV CODE 250: Performed by: INTERNAL MEDICINE

## 2024-10-14 PROCEDURE — 63600175 PHARM REV CODE 636 W HCPCS: Mod: JZ,JG | Performed by: NURSE PRACTITIONER

## 2024-10-14 PROCEDURE — 80053 COMPREHEN METABOLIC PANEL: CPT | Performed by: STUDENT IN AN ORGANIZED HEALTH CARE EDUCATION/TRAINING PROGRAM

## 2024-10-14 PROCEDURE — 21400001 HC TELEMETRY ROOM

## 2024-10-14 PROCEDURE — 25000003 PHARM REV CODE 250: Performed by: STUDENT IN AN ORGANIZED HEALTH CARE EDUCATION/TRAINING PROGRAM

## 2024-10-14 PROCEDURE — 85025 COMPLETE CBC W/AUTO DIFF WBC: CPT | Performed by: STUDENT IN AN ORGANIZED HEALTH CARE EDUCATION/TRAINING PROGRAM

## 2024-10-14 PROCEDURE — 63600175 PHARM REV CODE 636 W HCPCS: Performed by: INTERNAL MEDICINE

## 2024-10-14 PROCEDURE — 63600175 PHARM REV CODE 636 W HCPCS: Performed by: STUDENT IN AN ORGANIZED HEALTH CARE EDUCATION/TRAINING PROGRAM

## 2024-10-14 RX ADMIN — OXYBUTYNIN CHLORIDE 5 MG: 5 TABLET ORAL at 02:10

## 2024-10-14 RX ADMIN — INSULIN ASPART 8 UNITS: 100 INJECTION, SOLUTION INTRAVENOUS; SUBCUTANEOUS at 11:10

## 2024-10-14 RX ADMIN — INSULIN GLARGINE 15 UNITS: 100 INJECTION, SOLUTION SUBCUTANEOUS at 09:10

## 2024-10-14 RX ADMIN — INSULIN ASPART 3 UNITS: 100 INJECTION, SOLUTION INTRAVENOUS; SUBCUTANEOUS at 09:10

## 2024-10-14 RX ADMIN — OXYBUTYNIN CHLORIDE 5 MG: 5 TABLET ORAL at 09:10

## 2024-10-14 RX ADMIN — PIPERACILLIN SODIUM AND TAZOBACTAM SODIUM 4.5 G: 4; .5 INJECTION, POWDER, LYOPHILIZED, FOR SOLUTION INTRAVENOUS at 02:10

## 2024-10-14 RX ADMIN — DAPTOMYCIN 835 MG: 500 INJECTION, POWDER, LYOPHILIZED, FOR SOLUTION INTRAVENOUS at 02:10

## 2024-10-14 RX ADMIN — FERROUS SULFATE TAB 325 MG (65 MG ELEMENTAL FE) 1 EACH: 325 (65 FE) TAB at 10:10

## 2024-10-14 RX ADMIN — ENOXAPARIN SODIUM 40 MG: 40 INJECTION SUBCUTANEOUS at 05:10

## 2024-10-14 RX ADMIN — PIPERACILLIN SODIUM AND TAZOBACTAM SODIUM 4.5 G: 4; .5 INJECTION, POWDER, LYOPHILIZED, FOR SOLUTION INTRAVENOUS at 05:10

## 2024-10-14 RX ADMIN — FLUTICASONE PROPIONATE 100 MCG: 50 SPRAY, METERED NASAL at 10:10

## 2024-10-14 RX ADMIN — PIPERACILLIN SODIUM AND TAZOBACTAM SODIUM 4.5 G: 4; .5 INJECTION, POWDER, LYOPHILIZED, FOR SOLUTION INTRAVENOUS at 10:10

## 2024-10-14 RX ADMIN — TOBRAMYCIN SULFATE 580 MG: 40 INJECTION, SOLUTION INTRAMUSCULAR; INTRAVENOUS at 11:10

## 2024-10-14 RX ADMIN — INSULIN ASPART 6 UNITS: 100 INJECTION, SOLUTION INTRAVENOUS; SUBCUTANEOUS at 05:10

## 2024-10-14 RX ADMIN — Medication 500 MG: at 10:10

## 2024-10-14 RX ADMIN — CEFTAROLINE FOSAMIL 600 MG: 600 POWDER, FOR SOLUTION INTRAVENOUS at 09:10

## 2024-10-14 RX ADMIN — OXYBUTYNIN CHLORIDE 5 MG: 5 TABLET ORAL at 10:10

## 2024-10-14 RX ADMIN — DILTIAZEM HYDROCHLORIDE 120 MG: 120 CAPSULE, COATED, EXTENDED RELEASE ORAL at 10:10

## 2024-10-14 RX ADMIN — COLLAGENASE SANTYL: 250 OINTMENT TOPICAL at 10:10
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
## 2024-10-14 NOTE — PLAN OF CARE
Problem: Adult Inpatient Plan of Care  Goal: Plan of Care Review  Outcome: Progressing  Goal: Patient-Specific Goal (Individualized)  Outcome: Progressing  Goal: Absence of Hospital-Acquired Illness or Injury  Outcome: Progressing  Goal: Optimal Comfort and Wellbeing  Outcome: Progressing  Goal: Readiness for Transition of Care  Outcome: Progressing     Problem: Diabetes Comorbidity  Goal: Blood Glucose Level Within Targeted Range  Outcome: Progressing     Problem: Wound  Goal: Optimal Coping  Outcome: Progressing  Goal: Optimal Functional Ability  Outcome: Progressing  Goal: Absence of Infection Signs and Symptoms  Outcome: Progressing  Goal: Improved Oral Intake  Outcome: Progressing  Goal: Optimal Pain Control and Function  Outcome: Progressing  Goal: Skin Health and Integrity  Outcome: Progressing  Goal: Optimal Wound Healing  Outcome: Progressing     Problem: Skin Injury Risk Increased  Goal: Skin Health and Integrity  Outcome: Progressing     Problem: Fall Injury Risk  Goal: Absence of Fall and Fall-Related Injury  Outcome: Progressing      CM spoke with patient's wife at the bedside  Patient's wife would like prescriptions to be filled by [address] [facility]  Patient's wife is able to pick them up at the pharmacy  CM will f/u with patient and wife at the bedside re:medications  CM updated SLIM and nursing with plan of care
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
## 2024-10-15 VITALS
TEMPERATURE: 98 F | HEART RATE: 50 BPM | BODY MASS INDEX: 34.86 KG/M2 | SYSTOLIC BLOOD PRESSURE: 122 MMHG | OXYGEN SATURATION: 99 % | WEIGHT: 230 LBS | DIASTOLIC BLOOD PRESSURE: 74 MMHG | HEIGHT: 68 IN | RESPIRATION RATE: 18 BRPM

## 2024-10-15 LAB
CREAT SERPL-MCNC: 1.22 MG/DL (ref 0.73–1.18)
GFR SERPLBLD CREATININE-BSD FMLA CKD-EPI: >60 ML/MIN/1.73/M2
GLUCOSE SERPL-MCNC: 252 MG/DL (ref 70–110)
POCT GLUCOSE: 215 MG/DL (ref 70–110)
POCT GLUCOSE: 229 MG/DL (ref 70–110)
TOBRAMYCIN TROUGH SERPL-MCNC: 9.7 UG/ML (ref 0.3–2)

## 2024-10-15 PROCEDURE — 25000003 PHARM REV CODE 250: Performed by: NURSE PRACTITIONER

## 2024-10-15 PROCEDURE — 63600175 PHARM REV CODE 636 W HCPCS: Performed by: INTERNAL MEDICINE

## 2024-10-15 PROCEDURE — 02HV33Z INSERTION OF INFUSION DEVICE INTO SUPERIOR VENA CAVA, PERCUTANEOUS APPROACH: ICD-10-PCS | Performed by: INTERNAL MEDICINE

## 2024-10-15 PROCEDURE — 80200 ASSAY OF TOBRAMYCIN: CPT | Performed by: STUDENT IN AN ORGANIZED HEALTH CARE EDUCATION/TRAINING PROGRAM

## 2024-10-15 PROCEDURE — 25000003 PHARM REV CODE 250: Performed by: INTERNAL MEDICINE

## 2024-10-15 PROCEDURE — 97606 NEG PRS WND THER DME>50 SQCM: CPT

## 2024-10-15 PROCEDURE — 94760 N-INVAS EAR/PLS OXIMETRY 1: CPT

## 2024-10-15 PROCEDURE — 82565 ASSAY OF CREATININE: CPT | Performed by: STUDENT IN AN ORGANIZED HEALTH CARE EDUCATION/TRAINING PROGRAM

## 2024-10-15 PROCEDURE — 99233 SBSQ HOSP IP/OBS HIGH 50: CPT | Mod: ,,,

## 2024-10-15 PROCEDURE — 36569 INSJ PICC 5 YR+ W/O IMAGING: CPT

## 2024-10-15 PROCEDURE — C1751 CATH, INF, PER/CENT/MIDLINE: HCPCS

## 2024-10-15 PROCEDURE — 63600175 PHARM REV CODE 636 W HCPCS: Mod: JZ,JG | Performed by: NURSE PRACTITIONER

## 2024-10-15 PROCEDURE — 36415 COLL VENOUS BLD VENIPUNCTURE: CPT | Performed by: STUDENT IN AN ORGANIZED HEALTH CARE EDUCATION/TRAINING PROGRAM

## 2024-10-15 RX ORDER — SODIUM CHLORIDE 0.9 % (FLUSH) 0.9 %
10 SYRINGE (ML) INJECTION EVERY 6 HOURS
Status: DISCONTINUED | OUTPATIENT
Start: 2024-10-15 | End: 2024-10-15 | Stop reason: HOSPADM

## 2024-10-15 RX ORDER — SODIUM CHLORIDE 0.9 % (FLUSH) 0.9 %
10 SYRINGE (ML) INJECTION
Status: DISCONTINUED | OUTPATIENT
Start: 2024-10-15 | End: 2024-10-15 | Stop reason: HOSPADM

## 2024-10-15 RX ADMIN — OXYBUTYNIN CHLORIDE 5 MG: 5 TABLET ORAL at 02:10

## 2024-10-15 RX ADMIN — Medication 500 MG: at 09:10

## 2024-10-15 RX ADMIN — FLUTICASONE PROPIONATE 100 MCG: 50 SPRAY, METERED NASAL at 09:10

## 2024-10-15 RX ADMIN — CEFTAROLINE FOSAMIL 600 MG: 600 POWDER, FOR SOLUTION INTRAVENOUS at 11:10

## 2024-10-15 RX ADMIN — FERROUS SULFATE TAB 325 MG (65 MG ELEMENTAL FE) 1 EACH: 325 (65 FE) TAB at 09:10

## 2024-10-15 RX ADMIN — OXYBUTYNIN CHLORIDE 5 MG: 5 TABLET ORAL at 09:10

## 2024-10-15 RX ADMIN — INSULIN ASPART 4 UNITS: 100 INJECTION, SOLUTION INTRAVENOUS; SUBCUTANEOUS at 12:10

## 2024-10-15 RX ADMIN — INSULIN ASPART 4 UNITS: 100 INJECTION, SOLUTION INTRAVENOUS; SUBCUTANEOUS at 06:10

## 2024-10-15 RX ADMIN — DILTIAZEM HYDROCHLORIDE 120 MG: 120 CAPSULE, COATED, EXTENDED RELEASE ORAL at 09:10

## 2024-10-15 NOTE — PROCEDURES
"Antonio Galvan II is a 59 y.o. male patient.    Temp: 98.1 °F (36.7 °C) (10/15/24 0712)  Pulse: (!) 52 (10/15/24 0712)  Resp: 18 (10/15/24 0712)  BP: 118/67 (10/15/24 0712)  SpO2: 97 % (10/15/24 0712)  Weight: 104.3 kg (230 lb) (10/12/24 1130)  Height: 5' 8" (172.7 cm) (10/12/24 1130)    PICC  Time out: Immediately prior to procedure a time out was called to verify the correct patient, procedure, equipment, support staff and site/side marked as required  Indications: med administration  Preparation: skin prepped with ChloraPrep  Skin prep agent dried: skin prep agent completely dried prior to procedure  Sterile barriers: all five maximum sterile barriers used - cap, mask, sterile gown, sterile gloves, and large sterile sheet  Hand hygiene: hand hygiene performed prior to central venous catheter insertion  Location details: left basilic  Catheter type: double lumen  Catheter size: 5 Fr  Catheter Length: 41cm    Ultrasound guidance: yes  Needle advanced into vessel with real time Ultrasound guidance.  Guidewire confirmed in vessel.  Sterile sheath used.            Name sherron  10/15/2024    "
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
## 2024-10-15 NOTE — PLAN OF CARE
Problem: Adult Inpatient Plan of Care  Goal: Plan of Care Review  Outcome: Progressing  Goal: Patient-Specific Goal (Individualized)  Outcome: Progressing  Goal: Absence of Hospital-Acquired Illness or Injury  Outcome: Progressing  Goal: Optimal Comfort and Wellbeing  Outcome: Progressing  Goal: Readiness for Transition of Care  Outcome: Progressing     Problem: Diabetes Comorbidity  Goal: Blood Glucose Level Within Targeted Range  Outcome: Progressing     Problem: Wound  Goal: Optimal Coping  Outcome: Progressing  Goal: Optimal Functional Ability  Outcome: Progressing  Goal: Absence of Infection Signs and Symptoms  Outcome: Progressing  Goal: Improved Oral Intake  Outcome: Progressing  Goal: Optimal Pain Control and Function  Outcome: Progressing  Goal: Skin Health and Integrity  Outcome: Progressing  Goal: Optimal Wound Healing  Outcome: Progressing     Problem: Skin Injury Risk Increased  Goal: Skin Health and Integrity  Outcome: Progressing     Problem: Fall Injury Risk  Goal: Absence of Fall and Fall-Related Injury  Outcome: Progressing     Problem: Infection  Goal: Absence of Infection Signs and Symptoms  Outcome: Progressing

## 2024-10-15 NOTE — PLAN OF CARE
10/15/24 1605   Final Note   Assessment Type Final Discharge Note   Anticipated Discharge Disposition IV Therapy   Hospital Resources/Appts/Education Provided Post-Acute resouces added to AVS   Post-Acute Status   Post-Acute Authorization IV Infusion;Home Health   Home Health Status Set-up Complete/Auth obtained   IV Infusion Status Set-up Complete/Auth obtained   Discharge Delays None known at this time     Pt to d/c home with family and sitter. Anju completed bedside teaching and will have meds delivered to pt's home with evening. [facility] updated on POC.     Laurence Keith LCSW

## 2024-10-15 NOTE — PLAN OF CARE
Met with pt and sitter at bedside to update on d/c POC. Pt will go home on long-term IV abx. Referral sent to [facility] via [employer]. Updates sent to [facility].    Laurence Keith LCSW    1300 Received update from León at [facility] stating she will be here around 2pm to complete bedside teaching with pt and family.
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
## 2024-10-15 NOTE — PROGRESS NOTES
Infectious Diseases Progress Note  57 y/o male with Hx of Afib, DM Type II, quadriplegia, non healing sacral wound who presented to ER on 10/8 with worsening sacral wound requiring IV antibiotics. On admit he was afebrile without leukocytosis. .1 and ESR 86. Blood cultures negative thus far. U/A with 11-20 WBC, 0-5 RBC, 500 LE, moderate bacteria, negative nitrites. Urine culture >100k ESBL Proteus. NM 3 phase bone scan showed findings of pelvis cellulitis/ulceration without definitive suggestion of osteomyelitis. He was seen by Wound care team, sacral wound noted to have bone exposure. Wound cultures revealed moderate growth Proteus, moderate growth CR Pseudomonas, few MRSA.  He is currently on Zosyn and Daptomycin.    Subjective:  Lying in bed, no acute distress. No new complaints voiced. Afebrile. Caregiver present    Review of Systems   Constitutional:  Positive for malaise/fatigue.   HENT: Negative.     Eyes: Negative.    Respiratory: Negative.     Cardiovascular: Negative.    Gastrointestinal: Negative.    Musculoskeletal: Negative.    Skin:         Sacral wound   Neurological:  Positive for focal weakness and weakness.   All other systems reviewed and are negative.      Review of patient's allergies indicates:   Allergen Reactions    Metoprolol Other (See Comments)     Pt refuses, states his arrest occurred after receiving this medication       Past Medical History:   Diagnosis Date    A-fib     Cardiac arrest     7/2022    Chronic skin ulcer     DM (diabetes mellitus)     Infected decubitus ulcer     Lymphedema of leg     Neurogenic bladder     Obesity, unspecified     Pressure ulcer of heel     Pressure ulcer of right foot, stage 3     Pressure ulcer of right ischium     Quadriplegia     Quadriplegic spinal paralysis        Past Surgical History:   Procedure Laterality Date    APPLICATION OF WOUND VACUUM-ASSISTED CLOSURE DEVICE Right 5/12/2023    Procedure: APPLICATION, WOUND VAC;  Surgeon: Keyonna PATTEN  MD Ted;  Location: [facility] OR;  Service: General;  Laterality: Right;    DEBRIDEMENT OF BUTTOCKS Right 5/12/2023    Procedure: DEBRIDEMENT, BUTTOCK;  Surgeon: Keyonna Jean MD;  Location: [facility] OR;  Service: General;  Laterality: Right;    INCISION AND DRAINAGE HIP Bilateral 8/16/2023    Procedure: INCISION AND DRAINAGE, HIP;  Surgeon: Ryan Tirado MD;  Location: [facility] OR;  Service: General;  Laterality: Bilateral;  1. Excisional debridement skin to bone, skin to bone with biopsy and debridement of hard bone at the Left hip necrotic wound 11 cm long 5-1/2 cm wide 4-1/2 cm deep upon completion of debridement   2. Excisional debridement skin to muscle right hip with debridement    PRESSURE ULCER DEBRIDEMENT N/A 2/27/2024    Procedure: DEBRIDEMENT, PRESSURE ULCER;  Surgeon: Keyonna Jean MD;  Location: [facility];  Service: General;  Laterality: N/A;       Social History     Socioeconomic History    Marital status: Single   Tobacco Use    Smoking status: Never    Smokeless tobacco: Never   Substance and Sexual Activity    Alcohol use: Never    Drug use: Never    Sexual activity: Not Currently     Social Drivers of Health     Financial Resource Strain: Low Risk  (10/12/2024)    Overall Financial Resource Strain (CARDIA)     Difficulty of Paying Living Expenses: Not hard at all   Food Insecurity: No Food Insecurity (10/12/2024)    Hunger Vital Sign     Worried About Running Out of Food in the Last Year: Never true     Ran Out of Food in the Last Year: Never true   Transportation Needs: No Transportation Needs (10/12/2024)    TRANSPORTATION NEEDS     Transportation : No   Physical Activity: Inactive (2/27/2024)    Exercise Vital Sign     Days of Exercise per Week: 0 days     Minutes of Exercise per Session: 0 min   Stress: No Stress Concern Present (10/12/2024)    [employer] - Occupational Stress Questionnaire     Feeling of Stress : Not at all   Housing Stability: Low  "Risk  (10/12/2024)    Housing Stability Vital Sign     Unable to Pay for Housing in the Last Year: No     Homeless in the Last Year: No         Scheduled Meds:   ascorbic acid (vitamin C)  500 mg Oral Daily    ceftaroline (Teflaro) IV (PEDS and ADULTS)  600 mg Intravenous Q12H    collagenase   Topical (Top) Daily    diltiaZEM  120 mg Oral Daily    enoxparin  40 mg Subcutaneous Daily    ferrous sulfate  1 tablet Oral Daily    fluticasone propionate  2 spray Each Nostril Daily    insulin glargine U-100  15 Units Subcutaneous QHS    oxybutynin  5 mg Oral TID     Continuous Infusions:  PRN Meds:  Current Facility-Administered Medications:     acetaminophen, 1,000 mg, Oral, Q6H PRN    acetaminophen, 650 mg, Oral, Q4H PRN    dextrose 10%, 12.5 g, Intravenous, PRN    dextrose 10%, 25 g, Intravenous, PRN    glucagon (human recombinant), 1 mg, Intramuscular, PRN    glucose, 16 g, Oral, PRN    glucose, 24 g, Oral, PRN    HYDROcodone-acetaminophen, 1 tablet, Oral, Q6H PRN    insulin aspart U-100, 0-10 Units, Subcutaneous, QID (AC + HS) PRN    naloxone, 0.02 mg, Intravenous, PRN    ondansetron, 4 mg, Intravenous, Q4H PRN    prochlorperazine, 5 mg, Intravenous, Q6H PRN    sodium chloride 0.9%, 10 mL, Intravenous, PRN    Objective:  BP (!) 157/77   Pulse (!) 58   Temp 98.3 °F (36.8 °C) (Axillary)   Resp 20   Ht 5' 8" (1.727 m)   Wt 104.3 kg (230 lb)   SpO2 98%   BMI 34.97 kg/m²     Physical Exam:   Physical Exam  Vitals reviewed.   Constitutional:       Appearance: He is obese.   HENT:      Head: Normocephalic.   Cardiovascular:      Rate and Rhythm: Normal rate and regular rhythm.   Pulmonary:      Effort: Pulmonary effort is normal. No respiratory distress.      Breath sounds: Normal breath sounds. No wheezing.   Abdominal:      General: Bowel sounds are normal. There is no distension.      Palpations: Abdomen is soft.      Tenderness: There is no abdominal tenderness.   Musculoskeletal:      Cervical back: Normal range " of motion.      Comments: Quadriplegia   Skin:     Findings: No rash.      Comments: Wounds dressed   Neurological:      Mental Status: He is alert and oriented to person, place, and time.   Psychiatric:         Mood and Affect: Mood normal.         Behavior: Behavior normal.       Imaging      Lab Review   Recent Results (from the past 24 hours)   POCT glucose    Collection Time: 10/13/24  9:42 PM   Result Value Ref Range    POCT Glucose 230 (H) 70 - 110 mg/dL   Occult Blood, Stool 3rd Specimen    Collection Time: 10/14/24  9:18 AM   Result Value Ref Range    Stool Color 3 Brown     Stool Consistancy 3 soft     Occult Blood Stool 3 Negative Negative, N/A   POCT glucose    Collection Time: 10/14/24 11:02 AM   Result Value Ref Range    POCT Glucose 302 (H) 70 - 110 mg/dL   Comprehensive Metabolic Panel    Collection Time: 10/14/24  2:38 PM   Result Value Ref Range    Sodium 139 136 - 145 mmol/L    Potassium 4.7 3.5 - 5.1 mmol/L    Chloride 109 (H) 98 - 107 mmol/L    CO2 23 22 - 29 mmol/L    Glucose 299 (H) 74 - 100 mg/dL    Blood Urea Nitrogen 22.1 8.4 - 25.7 mg/dL    Creatinine 1.15 0.73 - 1.18 mg/dL    Calcium 8.6 8.4 - 10.2 mg/dL    Protein Total 6.5 6.4 - 8.3 gm/dL    Albumin 2.4 (L) 3.5 - 5.0 g/dL    Globulin 4.1 (H) 2.4 - 3.5 gm/dL    Albumin/Globulin Ratio 0.6 (L) 1.1 - 2.0 ratio    Bilirubin Total 0.2 <=1.5 mg/dL    ALP 74 40 - 150 unit/L    ALT 11 0 - 55 unit/L    AST 9 5 - 34 unit/L    eGFR >60 mL/min/1.73/m2    Anion Gap 7.0 mEq/L    BUN/Creatinine Ratio 19    CBC with Differential    Collection Time: 10/14/24  2:38 PM   Result Value Ref Range    WBC 7.14 4.50 - 11.50 x10(3)/mcL    RBC 3.16 (L) 4.70 - 6.10 x10(6)/mcL    Hgb 8.8 (L) 14.0 - 18.0 g/dL    Hct 28.0 (L) 42.0 - 52.0 %    MCV 88.6 80.0 - 94.0 fL    MCH 27.8 27.0 - 31.0 pg    MCHC 31.4 (L) 33.0 - 36.0 g/dL    RDW 15.7 11.5 - 17.0 %    Platelet 237 130 - 400 x10(3)/mcL    MPV 10.4 7.4 - 10.4 fL    Neut % 61.7 %    Lymph % 28.7 %    Mono % 5.6 %     Eos % 2.7 %    Basophil % 0.6 %    Lymph # 2.05 0.6 - 4.6 x10(3)/mcL    Neut # 4.41 2.1 - 9.2 x10(3)/mcL    Mono # 0.40 0.1 - 1.3 x10(3)/mcL    Eos # 0.19 0 - 0.9 x10(3)/mcL    Baso # 0.04 <=0.2 x10(3)/mcL    IG# 0.05 (H) 0 - 0.04 x10(3)/mcL    IG% 0.7 %    NRBC% 0.0 %   POCT glucose    Collection Time: 10/14/24  4:43 PM   Result Value Ref Range    POCT Glucose 263 (H) 70 - 110 mg/dL       Assessment/Plan:  SIRS  Sacral wound infection with clinical osteomyelitis - Proteus / CR Pseudomonas / MRSA isolates  GISSEL  ESBL Proteus complicated UTI   DM Type II  Afib  Quadriplegia  Neurogenic bladder / Suprapubic catheter  Anemia    -Will D/C Zosyn and Daptomycin. Place on Ceftaroline #1 and Tobramycin #1  -Plan a 6 week course following inflammatory markers [End 11/25]  -Afebrile without leukocytosis  -Continue wound care  -Sacral wound cultures revealed Proteus, CR Pseudomonas and MRSA  -U/A abnormal with urine culture >100k ESBL Proteus  -Discussed with patient, caregiver and nursing. CM to work on discharge home with HH and OPAT

## 2024-10-15 NOTE — PROGRESS NOTES
[facility]  Wound Care    Patient Name:  Antonio Galvan II   MRN:  [id number]  Date: 10/15/2024  Diagnosis: Pressure injury of right ischium, stage 4    History:     Past Medical History:   Diagnosis Date    A-fib     Cardiac arrest     7/2022    Chronic skin ulcer     DM (diabetes mellitus)     Infected decubitus ulcer     Lymphedema of leg     Neurogenic bladder     Obesity, unspecified     Pressure ulcer of heel     Pressure ulcer of right foot, stage 3     Pressure ulcer of right ischium     Quadriplegia     Quadriplegic spinal paralysis        Social History     Socioeconomic History    Marital status: Single   Tobacco Use    Smoking status: Never    Smokeless tobacco: Never   Substance and Sexual Activity    Alcohol use: Never    Drug use: Never    Sexual activity: Not Currently     Social Drivers of Health     Financial Resource Strain: Low Risk  (10/12/2024)    Overall Financial Resource Strain (CARDIA)     Difficulty of Paying Living Expenses: Not hard at all   Food Insecurity: No Food Insecurity (10/12/2024)    Hunger Vital Sign     Worried About Running Out of Food in the Last Year: Never true     Ran Out of Food in the Last Year: Never true   Transportation Needs: No Transportation Needs (10/12/2024)    TRANSPORTATION NEEDS     Transportation : No   Physical Activity: Inactive (2/27/2024)    Exercise Vital Sign     Days of Exercise per Week: 0 days     Minutes of Exercise per Session: 0 min   Stress: No Stress Concern Present (10/12/2024)    [employer] - Occupational Stress Questionnaire     Feeling of Stress : Not at all   Housing Stability: Low Risk  (10/12/2024)    Housing Stability Vital Sign     Unable to Pay for Housing in the Last Year: No     Homeless in the Last Year: No       Precautions:     Allergies as of 10/08/2024 - Reviewed 10/08/2024   Allergen Reaction Noted    Metoprolol Other (See Comments) 03/20/2024       WO Assessment  Details/Treatment      10/15/24 0951   WOCN Assessment   Visit Date 10/15/24   Visit Time 0951   Consult Type Follow Up   WOCN Speciality Wound   WOCN List wound vac   Wound pressure   Number of Wounds 3   Procedure wound vac   Intervention chart review;assessed;applied   Teaching on-going        Altered Skin Integrity 10/11/24 1241 Sacral spine Full thickness tissue loss with exposed bone, tendon, or muscle. Often includes undermining and tunneling. May extend into muscle and/or supporting structures.   Date First Assessed/Time First Assessed: 10/11/24 1241   Altered Skin Integrity Present on Admission - Did Patient arrive to the hospital with altered skin?: yes  Location: Sacral spine  Description of Altered Skin Integrity: Full thickness tissue los...   Wound Image    Dressing Appearance Intact;Dry;Clean;other (see comments)  (NPWT)   Drainage Amount Moderate   Drainage Characteristics/Odor Serosanguineous   Appearance Red;Yellow;Moist   Tissue loss description Full thickness   Black (%), Wound Tissue Color 0 %   Red (%), Wound Tissue Color 80 %   Yellow (%), Wound Tissue Color 20 %   Periwound Area Pale white;Pink;Dry   Wound Edges Defined   Wound Length (cm) 7 cm   Wound Width (cm) 4 cm   Wound Depth (cm) 3.2 cm   Wound Volume (cm^3) 89.6 cm^3   Wound Surface Area (cm^2) 28 cm^2   Undermining (depth (cm)/location) 7-12oclock   Care Cleansed with:;Antimicrobial agent;Wound cleanser;Other (see comments)  (Vashe)   Dressing Applied;Foam;Transparent film        Wound 10/11/24 Pressure Injury Right Ischial tuberosity   Date First Assessed: 10/11/24   Present on Original Admission: Yes  Primary Wound Type: Pressure Injury  Side: Right  Location: Ischial tuberosity   Wound Image    Dressing Appearance Intact;Dry;Clean;other (see comments)  (NPWT)   Drainage Amount Moderate   Drainage Characteristics/Odor Serosanguineous   Appearance Red;Moist   Tissue loss description Full thickness   Black (%), Wound Tissue Color 0 %    Red (%), Wound Tissue Color 100 %   Yellow (%), Wound Tissue Color 0 %   Periwound Area Dry;Pink;Pale white   Wound Edges Defined   Wound Length (cm) 6.4 cm   Wound Width (cm) 12 cm   Wound Depth (cm) 0.6 cm   Wound Volume (cm^3) 46.08 cm^3   Wound Surface Area (cm^2) 76.8 cm^2   Undermining (depth (cm)/location) 1-3oclock   Care Cleansed with:;Antimicrobial agent;Wound cleanser;Other (see comments)  (Vashe)   Dressing Applied;Foam;Transparent film;Other (comment)  (NPWT)        Wound 10/11/24 Pressure Injury Left Ischial tuberosity   Date First Assessed: 10/11/24   Present on Original Admission: Yes  Primary Wound Type: Pressure Injury  Side: Left  Location: Ischial tuberosity   Wound Image    Dressing Appearance Intact;Dry;Clean;other (see comments)  (NPWT)   Drainage Amount Moderate   Drainage Characteristics/Odor Serosanguineous   Appearance Red;Moist   Tissue loss description Full thickness   Black (%), Wound Tissue Color 0 %   Red (%), Wound Tissue Color 100 %   Yellow (%), Wound Tissue Color 0 %   Periwound Area Dry;Pink   Wound Edges Defined   Wound Length (cm) 3.7 cm   Wound Width (cm) 2.2 cm   Wound Depth (cm) 3.4 cm   Wound Volume (cm^3) 27.676 cm^3   Wound Surface Area (cm^2) 8.14 cm^2   Undermining (depth (cm)/location) 2.50at 5oclock   Care Cleansed with:;Antimicrobial agent;Wound cleanser;Other (see comments)  (Vashe)   Dressing Applied;Foam;Transparent film        Negative Pressure Wound Therapy  10/11/24    Placement Date: 10/11/24   Side: (c)   Location: Sacral Spine   NPWT Type Vacuum Therapy   Therapy Setting NPWT Continuous therapy   Pressure Setting NPWT 125 mmHg   Therapy Interventions NPWT Canister changed;Dressing changed;Seal intact;Trac pad replaced   Sponges Inserted NPWT Black;White;Other  (Versatel to right hip wound)   General Output (mL) 150   WOCN follow up for wound vac change. Discussed plan of care with nurse Terry prior to visiting the patient. Removed old dressing, cleansed  area and new photographs taken for EMR. Areas redressed with wound vac dressing, patient tolerated well. Possible discharge today pending long-term line, antibiotics, and vac set-up. Nursing to continue with vac and drainage monitoring. Reinforced education with patient from previous visit, answered all questions to the satisfaction of the patient and family. Will follow up if applicable.
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
## 2024-10-16 NOTE — CONSULTS
Infectious Diseases Consultation    Inpatient consult to Infectious diseases  Consult performed by: Nazario Valadez NP  Consult ordered by: Alfie Cash MD   Reason for consult: Stage IV sacral /left hip decubitus ulcer with possibe osteomyelitis     HPI:   59 y/o male with Hx of Afib, DM Type II, quadriplegia, non healing sacral wound who presented to ER on 10/8 with worsening sacral wound requiring IV antibiotics. On admit he was afebrile without leukocytosis. .1 and ESR 86. Blood cultures negative thus far. U/A with 11-20 WBC, 0-5 RBC, 500 LE, moderate bacteria, negative nitrites. Urine culture >100k ESBL Proteus. NM 3 phase bone scan showed findings of pelvis cellulitis/ulceration without definitive suggestion of osteomyelitis. He was seen by Wound care team, sacral wound noted to have bone exposure. Wound cultures pending.  He is currently on Zosyn and Doxycycline.    Past Medical and Surgical History  Allergies :   Metoprolol    Medical :   He has a past medical history of A-fib, Cardiac arrest, Chronic skin ulcer, DM (diabetes mellitus), Infected decubitus ulcer, Lymphedema of leg, Neurogenic bladder, Obesity, unspecified, Pressure ulcer of heel, Pressure ulcer of right foot, stage 3, Pressure ulcer of right ischium, Quadriplegia, and Quadriplegic spinal paralysis.    Surgical :   He has a past surgical history that includes Debridement of buttocks (Right, 5/12/2023); Application of wound vacuum-assisted closure device (Right, 5/12/2023); Incision and drainage hip (Bilateral, 8/16/2023); and Pressure ulcer debridement (N/A, 2/27/2024).     Family History  His family history is not on file.    Social History  He reports that he has never smoked. He has never used smokeless tobacco. He reports that he does not drink alcohol and does not use drugs.     ROS  Constitutional:  Positive for malaise/fatigue.   HENT: Negative.     Eyes: Negative.    Respiratory: Negative.     Cardiovascular:  "Negative.    Gastrointestinal: Negative.    Musculoskeletal: Negative.    Skin:         Sacral wound   Neurological:  Positive for focal weakness and weakness.   All other systems reviewed and are negative.    Objective   Physical Exam  Vitals reviewed.   Constitutional:       Appearance: He is obese.   HENT:      Head: Normocephalic.   Cardiovascular:      Rate and Rhythm: Normal rate and regular rhythm.   Pulmonary:      Effort: Pulmonary effort is normal. No respiratory distress.      Breath sounds: Normal breath sounds. No wheezing.   Abdominal:      General: Bowel sounds are normal. There is no distension.      Palpations: Abdomen is soft.      Tenderness: There is no abdominal tenderness.   Musculoskeletal:      Cervical back: Normal range of motion.      Comments: Quadriplegia   Skin:     Findings: No rash.      Comments: Wounds dressed   Neurological:      Mental Status: He is alert and oriented to person, place, and time.   Psychiatric:         Mood and Affect: Mood normal.         Behavior: Behavior normal.     VITAL SIGNS: 24 HR MIN & MAX LAST    Temp  Min: 97.9 °F (36.6 °C)  Max: 98.2 °F (36.8 °C)  97.9 °F (36.6 °C)        BP  Min: 118/67  Max: 122/74  122/74     Pulse  Min: 50  Max: 58  (!) 50     Resp  Min: 17  Max: 18  18    SpO2  Min: 97 %  Max: 99 %  99 %      HT: 5' 8" (172.7 cm)  WT: 104.3 kg (230 lb)  BMI: 35     Recent Results (from the past 24 hours)   POCT glucose    Collection Time: 10/15/24  2:48 AM   Result Value Ref Range    POC Glucose 252 (A) 70 - 110 MG/DL   POCT glucose    Collection Time: 10/15/24  5:37 AM   Result Value Ref Range    POCT Glucose 229 (H) 70 - 110 mg/dL   TOBRAMYCIN, TROUGH    Collection Time: 10/15/24  8:21 AM   Result Value Ref Range    Tobramycin Trough 9.7 (HH) 0.3 - 2.0 ug/ml   Creatinine, Serum    Collection Time: 10/15/24  8:21 AM   Result Value Ref Range    Creatinine 1.22 (H) 0.73 - 1.18 mg/dL    eGFR >60 mL/min/1.73/m2   POCT glucose    Collection Time: " 10/15/24 11:33 AM   Result Value Ref Range    POCT Glucose 215 (H) 70 - 110 mg/dL       Imaging    Impression  SIRS  Sacral wound infection with clinical osteomyelitis  GISSEL  ESBL Proteus complicated UTI   DM Type II  Afib  Quadriplegia  Neurogenic bladder / Suprapubic catheter  Anemia    Recommendations  I agree with the management of Mr Galvan. This is a 58 y/o male who presented to ER with worsening sacral wound. On admit he was afebrile without leukocytosis. U/A abnormal with urine culture >100k GNR. NM 3 phase bone scan showing pelvis cellulitis/ulceration without definite suggestion of osteomyelitis. He does have exposed bone. Wound cultures pending. We will continue Zosyn and place on Daptomycin. D/C oral Doxycycline. Final antibiotic recommendations will be based upon culture results. He will need a 6 week course following inflammatory markers. Continue wound care. Case discussed with Dr Hughes as well as with the patient, caregiver and nursing. Thank you for this consultation, we will continue to follow Mr Galvan with you.

## 2024-10-17 ENCOUNTER — LAB REQUISITION (OUTPATIENT)
Dept: LAB | Facility: HOSPITAL | Age: 59
End: 2024-10-17
Payer: MEDICARE

## 2024-10-17 DIAGNOSIS — L89.154 PRESSURE ULCER OF SACRAL REGION, STAGE 4: ICD-10-CM

## 2024-10-17 DIAGNOSIS — L89.314 PRESSURE ULCER OF RIGHT BUTTOCK, STAGE 4: ICD-10-CM

## 2024-10-17 LAB
ALBUMIN SERPL-MCNC: 2.7 G/DL (ref 3.5–5)
ALBUMIN/GLOB SERPL: 0.7 RATIO (ref 1.1–2)
ALP SERPL-CCNC: 84 UNIT/L (ref 40–150)
ALT SERPL-CCNC: 15 UNIT/L (ref 0–55)
ANION GAP SERPL CALC-SCNC: 9 MEQ/L
AST SERPL-CCNC: 12 UNIT/L (ref 5–34)
BILIRUB SERPL-MCNC: 0.2 MG/DL
BUN SERPL-MCNC: 27.7 MG/DL (ref 8.4–25.7)
CALCIUM SERPL-MCNC: 8.6 MG/DL (ref 8.4–10.2)
CHLORIDE SERPL-SCNC: 109 MMOL/L (ref 98–107)
CO2 SERPL-SCNC: 22 MMOL/L (ref 22–29)
CREAT SERPL-MCNC: 1.25 MG/DL (ref 0.73–1.18)
CREAT/UREA NIT SERPL: 22
GFR SERPLBLD CREATININE-BSD FMLA CKD-EPI: >60 ML/MIN/1.73/M2
GLOBULIN SER-MCNC: 3.8 GM/DL (ref 2.4–3.5)
GLUCOSE SERPL-MCNC: 223 MG/DL (ref 74–100)
POTASSIUM SERPL-SCNC: 4.8 MMOL/L (ref 3.5–5.1)
PROT SERPL-MCNC: 6.5 GM/DL (ref 6.4–8.3)
SODIUM SERPL-SCNC: 140 MMOL/L (ref 136–145)
TOBRAMYCIN TROUGH SERPL-MCNC: 7.7 UG/ML (ref 0.3–2)

## 2024-10-17 PROCEDURE — 80200 ASSAY OF TOBRAMYCIN: CPT | Performed by: INTERNAL MEDICINE

## 2024-10-17 PROCEDURE — 80053 COMPREHEN METABOLIC PANEL: CPT | Performed by: INTERNAL MEDICINE

## 2024-10-21 ENCOUNTER — LAB REQUISITION (OUTPATIENT)
Dept: LAB | Facility: HOSPITAL | Age: 59
End: 2024-10-21
Payer: MEDICARE

## 2024-10-21 DIAGNOSIS — B96.5 PSEUDOMONAS (AERUGINOSA) (MALLEI) (PSEUDOMALLEI) AS THE CAUSE OF DISEASES CLASSIFIED ELSEWHERE: ICD-10-CM

## 2024-10-21 DIAGNOSIS — L89.154 PRESSURE ULCER OF SACRAL REGION, STAGE 4: ICD-10-CM

## 2024-10-21 DIAGNOSIS — B95.62 METHICILLIN RESISTANT STAPHYLOCOCCUS AUREUS INFECTION AS THE CAUSE OF DISEASES CLASSIFIED ELSEWHERE: ICD-10-CM

## 2024-10-21 DIAGNOSIS — E11.00 TYPE 2 DIABETES MELLITUS WITH HYPEROSMOLARITY WITHOUT NONKETOTIC HYPERGLYCEMIC-HYPEROSMOLAR COMA (NKHHC): ICD-10-CM

## 2024-10-21 LAB
ALBUMIN SERPL-MCNC: 3 G/DL (ref 3.5–5)
ALBUMIN/GLOB SERPL: 0.8 RATIO (ref 1.1–2)
ALP SERPL-CCNC: 85 UNIT/L (ref 40–150)
ALT SERPL-CCNC: 13 UNIT/L (ref 0–55)
ANION GAP SERPL CALC-SCNC: 10 MEQ/L
AST SERPL-CCNC: 10 UNIT/L (ref 5–34)
BILIRUB SERPL-MCNC: 0.2 MG/DL
BUN SERPL-MCNC: 31.5 MG/DL (ref 8.4–25.7)
CALCIUM SERPL-MCNC: 8.7 MG/DL (ref 8.4–10.2)
CHLORIDE SERPL-SCNC: 109 MMOL/L (ref 98–107)
CO2 SERPL-SCNC: 19 MMOL/L (ref 22–29)
CREAT SERPL-MCNC: 1.36 MG/DL (ref 0.72–1.25)
CREAT/UREA NIT SERPL: 23
CRP SERPL-MCNC: 46.8 MG/L
ERYTHROCYTE [DISTWIDTH] IN BLOOD BY AUTOMATED COUNT: 16.9 % (ref 11.5–17)
ERYTHROCYTE [SEDIMENTATION RATE] IN BLOOD: >140 MM/HR (ref 0–15)
GFR SERPLBLD CREATININE-BSD FMLA CKD-EPI: >60 ML/MIN/1.73/M2
GLOBULIN SER-MCNC: 3.9 GM/DL (ref 2.4–3.5)
GLUCOSE SERPL-MCNC: 171 MG/DL (ref 74–100)
HCT VFR BLD AUTO: 30.1 % (ref 42–52)
HGB BLD-MCNC: 9.4 G/DL (ref 14–18)
MCH RBC QN AUTO: 28.3 PG (ref 27–31)
MCHC RBC AUTO-ENTMCNC: 31.2 G/DL (ref 33–36)
MCV RBC AUTO: 90.7 FL (ref 80–94)
PLATELET # BLD AUTO: 205 X10(3)/MCL (ref 130–400)
PMV BLD AUTO: 11.1 FL (ref 7.4–10.4)
POTASSIUM SERPL-SCNC: 4.8 MMOL/L (ref 3.5–5.1)
PROT SERPL-MCNC: 6.9 GM/DL (ref 6.4–8.3)
RBC # BLD AUTO: 3.32 X10(6)/MCL (ref 4.7–6.1)
SODIUM SERPL-SCNC: 138 MMOL/L (ref 136–145)
TOBRAMYCIN TROUGH SERPL-MCNC: 11 UG/ML (ref 0.3–2)
WBC # BLD AUTO: 6.39 X10(3)/MCL (ref 4.5–11.5)

## 2024-10-21 PROCEDURE — 86140 C-REACTIVE PROTEIN: CPT | Performed by: INTERNAL MEDICINE

## 2024-10-21 PROCEDURE — 80053 COMPREHEN METABOLIC PANEL: CPT | Performed by: INTERNAL MEDICINE

## 2024-10-21 PROCEDURE — 80200 ASSAY OF TOBRAMYCIN: CPT | Performed by: INTERNAL MEDICINE

## 2024-10-21 PROCEDURE — 85027 COMPLETE CBC AUTOMATED: CPT | Performed by: INTERNAL MEDICINE

## 2024-10-21 PROCEDURE — 85652 RBC SED RATE AUTOMATED: CPT | Performed by: INTERNAL MEDICINE
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
## 2024-10-23 ENCOUNTER — LAB REQUISITION (OUTPATIENT)
Dept: LAB | Facility: HOSPITAL | Age: 59
End: 2024-10-23
Payer: MEDICARE

## 2024-10-23 DIAGNOSIS — B65.0: ICD-10-CM

## 2024-10-23 DIAGNOSIS — B95.62 METHICILLIN RESISTANT STAPHYLOCOCCUS AUREUS INFECTION AS THE CAUSE OF DISEASES CLASSIFIED ELSEWHERE: ICD-10-CM

## 2024-10-23 DIAGNOSIS — L89.154 PRESSURE ULCER OF SACRAL REGION, STAGE 4: ICD-10-CM

## 2024-10-23 LAB
ALBUMIN SERPL-MCNC: 2.9 G/DL (ref 3.5–5)
ALBUMIN/GLOB SERPL: 0.8 RATIO (ref 1.1–2)
ALP SERPL-CCNC: 90 UNIT/L (ref 40–150)
ALT SERPL-CCNC: 13 UNIT/L (ref 0–55)
ANION GAP SERPL CALC-SCNC: 11 MEQ/L
AST SERPL-CCNC: 8 UNIT/L (ref 5–34)
BILIRUB SERPL-MCNC: 0.2 MG/DL
BUN SERPL-MCNC: 35.6 MG/DL (ref 8.4–25.7)
CALCIUM SERPL-MCNC: 8.4 MG/DL (ref 8.4–10.2)
CHLORIDE SERPL-SCNC: 109 MMOL/L (ref 98–107)
CO2 SERPL-SCNC: 21 MMOL/L (ref 22–29)
CREAT SERPL-MCNC: 1.7 MG/DL (ref 0.72–1.25)
CREAT/UREA NIT SERPL: 21
GFR SERPLBLD CREATININE-BSD FMLA CKD-EPI: 46 ML/MIN/1.73/M2
GLOBULIN SER-MCNC: 3.7 GM/DL (ref 2.4–3.5)
GLUCOSE SERPL-MCNC: 135 MG/DL (ref 74–100)
POTASSIUM SERPL-SCNC: 5.4 MMOL/L (ref 3.5–5.1)
PROT SERPL-MCNC: 6.6 GM/DL (ref 6.4–8.3)
SODIUM SERPL-SCNC: 141 MMOL/L (ref 136–145)

## 2024-10-23 PROCEDURE — 80200 ASSAY OF TOBRAMYCIN: CPT | Performed by: INTERNAL MEDICINE

## 2024-10-23 PROCEDURE — 80053 COMPREHEN METABOLIC PANEL: CPT | Performed by: INTERNAL MEDICINE

## 2024-10-24 ENCOUNTER — LAB REQUISITION (OUTPATIENT)
Dept: LAB | Facility: HOSPITAL | Age: 59
End: 2024-10-24
Payer: MEDICARE

## 2024-10-24 DIAGNOSIS — E16.8 OTHER SPECIFIED DISORDERS OF PANCREATIC INTERNAL SECRETION: ICD-10-CM

## 2024-10-24 DIAGNOSIS — B95.62 METHICILLIN RESISTANT STAPHYLOCOCCUS AUREUS INFECTION AS THE CAUSE OF DISEASES CLASSIFIED ELSEWHERE: ICD-10-CM

## 2024-10-24 DIAGNOSIS — L89.154 PRESSURE ULCER OF SACRAL REGION, STAGE 4: ICD-10-CM

## 2024-10-24 DIAGNOSIS — B96.5 PSEUDOMONAS (AERUGINOSA) (MALLEI) (PSEUDOMALLEI) AS THE CAUSE OF DISEASES CLASSIFIED ELSEWHERE: ICD-10-CM

## 2024-10-24 LAB
ALBUMIN SERPL-MCNC: 2.9 G/DL (ref 3.5–5)
ALBUMIN/GLOB SERPL: 0.8 RATIO (ref 1.1–2)
ALP SERPL-CCNC: 88 UNIT/L (ref 40–150)
ALT SERPL-CCNC: 11 UNIT/L (ref 0–55)
ANION GAP SERPL CALC-SCNC: 6 MEQ/L
AST SERPL-CCNC: 7 UNIT/L (ref 5–34)
BILIRUB SERPL-MCNC: 0.2 MG/DL
BUN SERPL-MCNC: 40.2 MG/DL (ref 8.4–25.7)
CALCIUM SERPL-MCNC: 8.4 MG/DL (ref 8.4–10.2)
CHLORIDE SERPL-SCNC: 108 MMOL/L (ref 98–107)
CO2 SERPL-SCNC: 22 MMOL/L (ref 22–29)
CREAT SERPL-MCNC: 1.78 MG/DL (ref 0.72–1.25)
CREAT/UREA NIT SERPL: 23
GFR SERPLBLD CREATININE-BSD FMLA CKD-EPI: 44 ML/MIN/1.73/M2
GLOBULIN SER-MCNC: 3.8 GM/DL (ref 2.4–3.5)
GLUCOSE SERPL-MCNC: 159 MG/DL (ref 74–100)
MAYO GENERIC ORDERABLE RESULT: NORMAL
POTASSIUM SERPL-SCNC: 4.8 MMOL/L (ref 3.5–5.1)
PROT SERPL-MCNC: 6.7 GM/DL (ref 6.4–8.3)
SODIUM SERPL-SCNC: 136 MMOL/L (ref 136–145)
TOBRAMYCIN TROUGH SERPL-MCNC: 1.2 UG/ML (ref 0.3–2)

## 2024-10-24 PROCEDURE — 80200 ASSAY OF TOBRAMYCIN: CPT | Performed by: INTERNAL MEDICINE

## 2024-10-24 PROCEDURE — 80053 COMPREHEN METABOLIC PANEL: CPT | Performed by: INTERNAL MEDICINE

## 2024-10-25 ENCOUNTER — LAB REQUISITION (OUTPATIENT)
Dept: LAB | Facility: HOSPITAL | Age: 59
End: 2024-10-25
Payer: MEDICARE

## 2024-10-25 DIAGNOSIS — B96.5 PSEUDOMONAS (AERUGINOSA) (MALLEI) (PSEUDOMALLEI) AS THE CAUSE OF DISEASES CLASSIFIED ELSEWHERE: ICD-10-CM

## 2024-10-25 DIAGNOSIS — L89.154 PRESSURE ULCER OF SACRAL REGION, STAGE 4: ICD-10-CM

## 2024-10-25 DIAGNOSIS — B95.62 METHICILLIN RESISTANT STAPHYLOCOCCUS AUREUS INFECTION AS THE CAUSE OF DISEASES CLASSIFIED ELSEWHERE: ICD-10-CM

## 2024-10-25 LAB
ALBUMIN SERPL-MCNC: 2.9 G/DL (ref 3.5–5)
ALBUMIN/GLOB SERPL: 0.8 RATIO (ref 1.1–2)
ALP SERPL-CCNC: 89 UNIT/L (ref 40–150)
ALT SERPL-CCNC: 11 UNIT/L (ref 0–55)
ANION GAP SERPL CALC-SCNC: 7 MEQ/L
AST SERPL-CCNC: 8 UNIT/L (ref 5–34)
BILIRUB SERPL-MCNC: 0.2 MG/DL
BUN SERPL-MCNC: 33.8 MG/DL (ref 8.4–25.7)
CALCIUM SERPL-MCNC: 8.7 MG/DL (ref 8.4–10.2)
CHLORIDE SERPL-SCNC: 110 MMOL/L (ref 98–107)
CO2 SERPL-SCNC: 21 MMOL/L (ref 22–29)
CREAT SERPL-MCNC: 1.58 MG/DL (ref 0.72–1.25)
CREAT/UREA NIT SERPL: 21
GFR SERPLBLD CREATININE-BSD FMLA CKD-EPI: 51 ML/MIN/1.73/M2
GLOBULIN SER-MCNC: 3.8 GM/DL (ref 2.4–3.5)
GLUCOSE SERPL-MCNC: 145 MG/DL (ref 74–100)
POTASSIUM SERPL-SCNC: 5.1 MMOL/L (ref 3.5–5.1)
PROT SERPL-MCNC: 6.7 GM/DL (ref 6.4–8.3)
SODIUM SERPL-SCNC: 138 MMOL/L (ref 136–145)
TOBRAMYCIN TROUGH SERPL-MCNC: 0.8 UG/ML (ref 0.3–2)

## 2024-10-25 PROCEDURE — 80053 COMPREHEN METABOLIC PANEL: CPT | Performed by: INTERNAL MEDICINE

## 2024-10-25 PROCEDURE — 80200 ASSAY OF TOBRAMYCIN: CPT | Performed by: INTERNAL MEDICINE

## 2024-10-29 ENCOUNTER — LAB REQUISITION (OUTPATIENT)
Dept: LAB | Facility: HOSPITAL | Age: 59
End: 2024-10-29
Payer: MEDICARE

## 2024-10-29 DIAGNOSIS — B96.5 PSEUDOMONAS (AERUGINOSA) (MALLEI) (PSEUDOMALLEI) AS THE CAUSE OF DISEASES CLASSIFIED ELSEWHERE: ICD-10-CM

## 2024-10-29 DIAGNOSIS — L89.154 PRESSURE ULCER OF SACRAL REGION, STAGE 4: ICD-10-CM

## 2024-10-29 LAB
ALBUMIN SERPL-MCNC: 2.8 G/DL (ref 3.5–5)
ALBUMIN/GLOB SERPL: 0.7 RATIO (ref 1.1–2)
ALP SERPL-CCNC: 91 UNIT/L (ref 40–150)
ALT SERPL-CCNC: 9 UNIT/L (ref 0–55)
ANION GAP SERPL CALC-SCNC: 8 MEQ/L
AST SERPL-CCNC: 7 UNIT/L (ref 5–34)
BASOPHILS # BLD AUTO: 0.08 X10(3)/MCL
BASOPHILS NFR BLD AUTO: 1.3 %
BILIRUB SERPL-MCNC: 0.2 MG/DL
BUN SERPL-MCNC: 28 MG/DL (ref 8.4–25.7)
CALCIUM SERPL-MCNC: 8.5 MG/DL (ref 8.4–10.2)
CHLORIDE SERPL-SCNC: 111 MMOL/L (ref 98–107)
CO2 SERPL-SCNC: 21 MMOL/L (ref 22–29)
CREAT SERPL-MCNC: 1.6 MG/DL (ref 0.72–1.25)
CREAT/UREA NIT SERPL: 18
CRP SERPL-MCNC: 40.5 MG/L
EOSINOPHIL # BLD AUTO: 0.49 X10(3)/MCL (ref 0–0.9)
EOSINOPHIL NFR BLD AUTO: 8.2 %
ERYTHROCYTE [DISTWIDTH] IN BLOOD BY AUTOMATED COUNT: 17 % (ref 11.5–17)
ERYTHROCYTE [SEDIMENTATION RATE] IN BLOOD: >140 MM/HR (ref 0–15)
GFR SERPLBLD CREATININE-BSD FMLA CKD-EPI: 50 ML/MIN/1.73/M2
GLOBULIN SER-MCNC: 3.8 GM/DL (ref 2.4–3.5)
GLUCOSE SERPL-MCNC: 140 MG/DL (ref 74–100)
HCT VFR BLD AUTO: 29.3 % (ref 42–52)
HGB BLD-MCNC: 9.2 G/DL (ref 14–18)
IMM GRANULOCYTES # BLD AUTO: 0 X10(3)/MCL (ref 0–0.04)
IMM GRANULOCYTES NFR BLD AUTO: 0 %
LYMPHOCYTES # BLD AUTO: 1.86 X10(3)/MCL (ref 0.6–4.6)
LYMPHOCYTES NFR BLD AUTO: 31.2 %
MCH RBC QN AUTO: 28.8 PG (ref 27–31)
MCHC RBC AUTO-ENTMCNC: 31.4 G/DL (ref 33–36)
MCV RBC AUTO: 91.6 FL (ref 80–94)
MONOCYTES # BLD AUTO: 0.37 X10(3)/MCL (ref 0.1–1.3)
MONOCYTES NFR BLD AUTO: 6.2 %
NEUTROPHILS # BLD AUTO: 3.17 X10(3)/MCL (ref 2.1–9.2)
NEUTROPHILS NFR BLD AUTO: 53.1 %
PLATELET # BLD AUTO: 170 X10(3)/MCL (ref 130–400)
PMV BLD AUTO: 11.5 FL (ref 7.4–10.4)
POTASSIUM SERPL-SCNC: 5.5 MMOL/L (ref 3.5–5.1)
PROT SERPL-MCNC: 6.6 GM/DL (ref 6.4–8.3)
RBC # BLD AUTO: 3.2 X10(6)/MCL (ref 4.7–6.1)
SODIUM SERPL-SCNC: 140 MMOL/L (ref 136–145)
TOBRAMYCIN TROUGH SERPL-MCNC: 0.3 UG/ML (ref 0.3–2)
WBC # BLD AUTO: 5.97 X10(3)/MCL (ref 4.5–11.5)

## 2024-10-29 PROCEDURE — 85652 RBC SED RATE AUTOMATED: CPT | Performed by: INTERNAL MEDICINE

## 2024-10-29 PROCEDURE — 80053 COMPREHEN METABOLIC PANEL: CPT | Performed by: INTERNAL MEDICINE

## 2024-10-29 PROCEDURE — 85025 COMPLETE CBC W/AUTO DIFF WBC: CPT | Performed by: INTERNAL MEDICINE

## 2024-10-29 PROCEDURE — 86140 C-REACTIVE PROTEIN: CPT | Performed by: INTERNAL MEDICINE

## 2024-10-29 PROCEDURE — 80200 ASSAY OF TOBRAMYCIN: CPT | Performed by: INTERNAL MEDICINE

## 2024-11-04 ENCOUNTER — LAB REQUISITION (OUTPATIENT)
Dept: LAB | Facility: HOSPITAL | Age: 59
End: 2024-11-04
Payer: MEDICARE

## 2024-11-04 DIAGNOSIS — B95.62 METHICILLIN RESISTANT STAPHYLOCOCCUS AUREUS INFECTION AS THE CAUSE OF DISEASES CLASSIFIED ELSEWHERE: ICD-10-CM

## 2024-11-04 DIAGNOSIS — B96.5 PSEUDOMONAS (AERUGINOSA) (MALLEI) (PSEUDOMALLEI) AS THE CAUSE OF DISEASES CLASSIFIED ELSEWHERE: ICD-10-CM

## 2024-11-04 DIAGNOSIS — L89.154 PRESSURE ULCER OF SACRAL REGION, STAGE 4: ICD-10-CM

## 2024-11-04 DIAGNOSIS — E11.00 TYPE 2 DIABETES MELLITUS WITH HYPEROSMOLARITY WITHOUT NONKETOTIC HYPERGLYCEMIC-HYPEROSMOLAR COMA (NKHHC): ICD-10-CM

## 2024-11-04 LAB
ALBUMIN SERPL-MCNC: 2.9 G/DL (ref 3.5–5)
ALBUMIN/GLOB SERPL: 0.8 RATIO (ref 1.1–2)
ALP SERPL-CCNC: 87 UNIT/L (ref 40–150)
ALT SERPL-CCNC: 9 UNIT/L (ref 0–55)
ANION GAP SERPL CALC-SCNC: 7 MEQ/L
AST SERPL-CCNC: 7 UNIT/L (ref 5–34)
BILIRUB SERPL-MCNC: 0.2 MG/DL
BUN SERPL-MCNC: 29.6 MG/DL (ref 8.4–25.7)
CALCIUM SERPL-MCNC: 8.8 MG/DL (ref 8.4–10.2)
CHLORIDE SERPL-SCNC: 111 MMOL/L (ref 98–107)
CO2 SERPL-SCNC: 19 MMOL/L (ref 22–29)
CREAT SERPL-MCNC: 1.54 MG/DL (ref 0.72–1.25)
CREAT/UREA NIT SERPL: 19
CRP SERPL-MCNC: 24.5 MG/L
ERYTHROCYTE [DISTWIDTH] IN BLOOD BY AUTOMATED COUNT: 16.2 % (ref 11.5–17)
ERYTHROCYTE [SEDIMENTATION RATE] IN BLOOD: >140 MM/HR (ref 0–15)
GFR SERPLBLD CREATININE-BSD FMLA CKD-EPI: 52 ML/MIN/1.73/M2
GLOBULIN SER-MCNC: 3.8 GM/DL (ref 2.4–3.5)
GLUCOSE SERPL-MCNC: 154 MG/DL (ref 74–100)
HCT VFR BLD AUTO: 30.8 % (ref 42–52)
HGB BLD-MCNC: 9.6 G/DL (ref 14–18)
MCH RBC QN AUTO: 28.4 PG (ref 27–31)
MCHC RBC AUTO-ENTMCNC: 31.2 G/DL (ref 33–36)
MCV RBC AUTO: 91.1 FL (ref 80–94)
PLATELET # BLD AUTO: 168 X10(3)/MCL (ref 130–400)
PMV BLD AUTO: 11.3 FL (ref 7.4–10.4)
POTASSIUM SERPL-SCNC: 5.3 MMOL/L (ref 3.5–5.1)
PROT SERPL-MCNC: 6.7 GM/DL (ref 6.4–8.3)
RBC # BLD AUTO: 3.38 X10(6)/MCL (ref 4.7–6.1)
SODIUM SERPL-SCNC: 137 MMOL/L (ref 136–145)
WBC # BLD AUTO: 5.6 X10(3)/MCL (ref 4.5–11.5)

## 2024-11-04 PROCEDURE — 85652 RBC SED RATE AUTOMATED: CPT | Performed by: INTERNAL MEDICINE

## 2024-11-04 PROCEDURE — 80053 COMPREHEN METABOLIC PANEL: CPT | Performed by: INTERNAL MEDICINE

## 2024-11-04 PROCEDURE — 86140 C-REACTIVE PROTEIN: CPT | Performed by: INTERNAL MEDICINE

## 2024-11-04 PROCEDURE — 85027 COMPLETE CBC AUTOMATED: CPT | Performed by: INTERNAL MEDICINE

## 2024-11-07 ENCOUNTER — LAB REQUISITION (OUTPATIENT)
Dept: LAB | Facility: HOSPITAL | Age: 59
End: 2024-11-07
Payer: MEDICARE

## 2024-11-07 DIAGNOSIS — B96.5 PSEUDOMONAS (AERUGINOSA) (MALLEI) (PSEUDOMALLEI) AS THE CAUSE OF DISEASES CLASSIFIED ELSEWHERE: ICD-10-CM

## 2024-11-07 DIAGNOSIS — E11.610 TYPE 2 DIABETES MELLITUS WITH DIABETIC NEUROPATHIC ARTHROPATHY: ICD-10-CM

## 2024-11-07 LAB — CK SERPL-CCNC: 23 U/L (ref 30–200)

## 2024-11-07 PROCEDURE — 82550 ASSAY OF CK (CPK): CPT | Performed by: INTERNAL MEDICINE

## 2024-11-11 ENCOUNTER — LAB REQUISITION (OUTPATIENT)
Dept: LAB | Facility: HOSPITAL | Age: 59
End: 2024-11-11
Payer: MEDICARE

## 2024-11-11 DIAGNOSIS — B95.62 METHICILLIN RESISTANT STAPHYLOCOCCUS AUREUS INFECTION AS THE CAUSE OF DISEASES CLASSIFIED ELSEWHERE: ICD-10-CM

## 2024-11-11 DIAGNOSIS — L89.154 PRESSURE ULCER OF SACRAL REGION, STAGE 4: ICD-10-CM

## 2024-11-11 LAB
ALBUMIN SERPL-MCNC: 2.6 G/DL (ref 3.5–5)
ALBUMIN/GLOB SERPL: 0.7 RATIO (ref 1.1–2)
ALP SERPL-CCNC: 97 UNIT/L (ref 40–150)
ALT SERPL-CCNC: 11 UNIT/L (ref 0–55)
ANION GAP SERPL CALC-SCNC: 7 MEQ/L
AST SERPL-CCNC: 7 UNIT/L (ref 5–34)
BILIRUB SERPL-MCNC: 0.2 MG/DL
BUN SERPL-MCNC: 20.2 MG/DL (ref 8.4–25.7)
CALCIUM SERPL-MCNC: 8.5 MG/DL (ref 8.4–10.2)
CHLORIDE SERPL-SCNC: 109 MMOL/L (ref 98–107)
CK SERPL-CCNC: 44 U/L (ref 30–200)
CO2 SERPL-SCNC: 21 MMOL/L (ref 22–29)
CREAT SERPL-MCNC: 1.16 MG/DL (ref 0.72–1.25)
CREAT/UREA NIT SERPL: 17
CRP SERPL-MCNC: 29.5 MG/L
ERYTHROCYTE [DISTWIDTH] IN BLOOD BY AUTOMATED COUNT: 16.2 % (ref 11.5–17)
ERYTHROCYTE [SEDIMENTATION RATE] IN BLOOD: >140 MM/HR (ref 0–15)
GFR SERPLBLD CREATININE-BSD FMLA CKD-EPI: >60 ML/MIN/1.73/M2
GLOBULIN SER-MCNC: 3.7 GM/DL (ref 2.4–3.5)
GLUCOSE SERPL-MCNC: 261 MG/DL (ref 74–100)
HCT VFR BLD AUTO: 30 % (ref 42–52)
HGB BLD-MCNC: 9.5 G/DL (ref 14–18)
MCH RBC QN AUTO: 28.8 PG (ref 27–31)
MCHC RBC AUTO-ENTMCNC: 31.7 G/DL (ref 33–36)
MCV RBC AUTO: 90.9 FL (ref 80–94)
PLATELET # BLD AUTO: 181 X10(3)/MCL (ref 130–400)
PMV BLD AUTO: 11.4 FL (ref 7.4–10.4)
POTASSIUM SERPL-SCNC: 5.1 MMOL/L (ref 3.5–5.1)
PROT SERPL-MCNC: 6.3 GM/DL (ref 6.4–8.3)
RBC # BLD AUTO: 3.3 X10(6)/MCL (ref 4.7–6.1)
SODIUM SERPL-SCNC: 137 MMOL/L (ref 136–145)
WBC # BLD AUTO: 6.79 X10(3)/MCL (ref 4.5–11.5)

## 2024-11-11 PROCEDURE — 85652 RBC SED RATE AUTOMATED: CPT | Performed by: INTERNAL MEDICINE

## 2024-11-11 PROCEDURE — 85027 COMPLETE CBC AUTOMATED: CPT | Performed by: INTERNAL MEDICINE

## 2024-11-11 PROCEDURE — 86140 C-REACTIVE PROTEIN: CPT | Performed by: INTERNAL MEDICINE

## 2024-11-11 PROCEDURE — 80053 COMPREHEN METABOLIC PANEL: CPT | Performed by: INTERNAL MEDICINE

## 2024-11-11 PROCEDURE — 82550 ASSAY OF CK (CPK): CPT | Performed by: INTERNAL MEDICINE

## 2024-11-18 ENCOUNTER — LAB REQUISITION (OUTPATIENT)
Dept: LAB | Facility: HOSPITAL | Age: 59
End: 2024-11-18
Payer: MEDICARE

## 2024-11-18 DIAGNOSIS — L89.154 PRESSURE ULCER OF SACRAL REGION, STAGE 4: ICD-10-CM

## 2024-11-18 DIAGNOSIS — B96.5 PSEUDOMONAS (AERUGINOSA) (MALLEI) (PSEUDOMALLEI) AS THE CAUSE OF DISEASES CLASSIFIED ELSEWHERE: ICD-10-CM

## 2024-11-18 DIAGNOSIS — E11.00 TYPE 2 DIABETES MELLITUS WITH HYPEROSMOLARITY WITHOUT NONKETOTIC HYPERGLYCEMIC-HYPEROSMOLAR COMA (NKHHC): ICD-10-CM

## 2024-11-18 DIAGNOSIS — B95.62 METHICILLIN RESISTANT STAPHYLOCOCCUS AUREUS INFECTION AS THE CAUSE OF DISEASES CLASSIFIED ELSEWHERE: ICD-10-CM

## 2024-11-18 LAB
ALBUMIN SERPL-MCNC: 2.9 G/DL (ref 3.5–5)
ALBUMIN/GLOB SERPL: 0.7 RATIO (ref 1.1–2)
ALP SERPL-CCNC: 107 UNIT/L (ref 40–150)
ALT SERPL-CCNC: 17 UNIT/L (ref 0–55)
ANION GAP SERPL CALC-SCNC: 9 MEQ/L
AST SERPL-CCNC: 11 UNIT/L (ref 5–34)
BILIRUB SERPL-MCNC: 0.2 MG/DL
BUN SERPL-MCNC: 19 MG/DL (ref 8.4–25.7)
CALCIUM SERPL-MCNC: 9.1 MG/DL (ref 8.4–10.2)
CHLORIDE SERPL-SCNC: 105 MMOL/L (ref 98–107)
CK SERPL-CCNC: 64 U/L (ref 30–200)
CO2 SERPL-SCNC: 22 MMOL/L (ref 22–29)
CREAT SERPL-MCNC: 1.03 MG/DL (ref 0.72–1.25)
CREAT/UREA NIT SERPL: 18
CRP SERPL-MCNC: 31.9 MG/L
ERYTHROCYTE [DISTWIDTH] IN BLOOD BY AUTOMATED COUNT: 15.5 % (ref 11.5–17)
ERYTHROCYTE [SEDIMENTATION RATE] IN BLOOD: 73 MM/HR (ref 0–15)
GFR SERPLBLD CREATININE-BSD FMLA CKD-EPI: >60 ML/MIN/1.73/M2
GLOBULIN SER-MCNC: 4.3 GM/DL (ref 2.4–3.5)
GLUCOSE SERPL-MCNC: 274 MG/DL (ref 74–100)
HCT VFR BLD AUTO: 35.4 % (ref 42–52)
HGB BLD-MCNC: 11.3 G/DL (ref 14–18)
MCH RBC QN AUTO: 28.8 PG (ref 27–31)
MCHC RBC AUTO-ENTMCNC: 31.9 G/DL (ref 33–36)
MCV RBC AUTO: 90.1 FL (ref 80–94)
PLATELET # BLD AUTO: 213 X10(3)/MCL (ref 130–400)
PMV BLD AUTO: 11.2 FL (ref 7.4–10.4)
POTASSIUM SERPL-SCNC: 4.7 MMOL/L (ref 3.5–5.1)
PROT SERPL-MCNC: 7.2 GM/DL (ref 6.4–8.3)
RBC # BLD AUTO: 3.93 X10(6)/MCL (ref 4.7–6.1)
SODIUM SERPL-SCNC: 136 MMOL/L (ref 136–145)
WBC # BLD AUTO: 6.63 X10(3)/MCL (ref 4.5–11.5)

## 2024-11-18 PROCEDURE — 82550 ASSAY OF CK (CPK): CPT | Performed by: INTERNAL MEDICINE

## 2024-11-18 PROCEDURE — 85027 COMPLETE CBC AUTOMATED: CPT | Performed by: INTERNAL MEDICINE

## 2024-11-18 PROCEDURE — 86140 C-REACTIVE PROTEIN: CPT | Performed by: INTERNAL MEDICINE

## 2024-11-18 PROCEDURE — 80053 COMPREHEN METABOLIC PANEL: CPT | Performed by: INTERNAL MEDICINE

## 2024-11-18 PROCEDURE — 85652 RBC SED RATE AUTOMATED: CPT | Performed by: INTERNAL MEDICINE

## 2024-12-19 ENCOUNTER — LAB REQUISITION (OUTPATIENT)
Dept: LAB | Facility: HOSPITAL | Age: 59
End: 2024-12-19
Payer: MEDICARE

## 2024-12-19 DIAGNOSIS — Z79.84 LONG TERM (CURRENT) USE OF ORAL HYPOGLYCEMIC DRUGS: ICD-10-CM

## 2024-12-19 LAB
25(OH)D3+25(OH)D2 SERPL-MCNC: 30 NG/ML (ref 30–80)
ALBUMIN SERPL-MCNC: 2.7 G/DL (ref 3.5–5)
ALBUMIN/GLOB SERPL: 0.7 RATIO (ref 1.1–2)
ALP SERPL-CCNC: 91 UNIT/L (ref 40–150)
ALT SERPL-CCNC: 8 UNIT/L (ref 0–55)
ANION GAP SERPL CALC-SCNC: 9 MEQ/L
AST SERPL-CCNC: 7 UNIT/L (ref 5–34)
BASOPHILS # BLD AUTO: 0.03 X10(3)/MCL
BASOPHILS NFR BLD AUTO: 0.4 %
BILIRUB SERPL-MCNC: 0.2 MG/DL
BUN SERPL-MCNC: 23.3 MG/DL (ref 8.4–25.7)
CALCIUM SERPL-MCNC: 8.7 MG/DL (ref 8.4–10.2)
CHLORIDE SERPL-SCNC: 103 MMOL/L (ref 98–107)
CO2 SERPL-SCNC: 22 MMOL/L (ref 22–29)
CREAT SERPL-MCNC: 0.94 MG/DL (ref 0.72–1.25)
CREAT/UREA NIT SERPL: 25
CRP SERPL-MCNC: 81 MG/L
EOSINOPHIL # BLD AUTO: 0.2 X10(3)/MCL (ref 0–0.9)
EOSINOPHIL NFR BLD AUTO: 2.4 %
ERYTHROCYTE [DISTWIDTH] IN BLOOD BY AUTOMATED COUNT: 13.8 % (ref 11.5–17)
ERYTHROCYTE [SEDIMENTATION RATE] IN BLOOD: 138 MM/HR (ref 0–15)
EST. AVERAGE GLUCOSE BLD GHB EST-MCNC: 165.7 MG/DL
GFR SERPLBLD CREATININE-BSD FMLA CKD-EPI: >60 ML/MIN/1.73/M2
GLOBULIN SER-MCNC: 4.1 GM/DL (ref 2.4–3.5)
GLUCOSE SERPL-MCNC: 286 MG/DL (ref 74–100)
HBA1C MFR BLD: 7.4 %
HCT VFR BLD AUTO: 34.9 % (ref 42–52)
HGB BLD-MCNC: 11.3 G/DL (ref 14–18)
IMM GRANULOCYTES # BLD AUTO: 0.03 X10(3)/MCL (ref 0–0.04)
IMM GRANULOCYTES NFR BLD AUTO: 0.4 %
LYMPHOCYTES # BLD AUTO: 1.31 X10(3)/MCL (ref 0.6–4.6)
LYMPHOCYTES NFR BLD AUTO: 15.8 %
MCH RBC QN AUTO: 28.5 PG (ref 27–31)
MCHC RBC AUTO-ENTMCNC: 32.4 G/DL (ref 33–36)
MCV RBC AUTO: 87.9 FL (ref 80–94)
MONOCYTES # BLD AUTO: 0.56 X10(3)/MCL (ref 0.1–1.3)
MONOCYTES NFR BLD AUTO: 6.8 %
NEUTROPHILS # BLD AUTO: 6.14 X10(3)/MCL (ref 2.1–9.2)
NEUTROPHILS NFR BLD AUTO: 74.2 %
PLATELET # BLD AUTO: 240 X10(3)/MCL (ref 130–400)
PMV BLD AUTO: 11.1 FL (ref 7.4–10.4)
POTASSIUM SERPL-SCNC: 4.2 MMOL/L (ref 3.5–5.1)
PREALB SERPL-MCNC: 21.6 MG/DL (ref 18–45)
PROT SERPL-MCNC: 6.8 GM/DL (ref 6.4–8.3)
RBC # BLD AUTO: 3.97 X10(6)/MCL (ref 4.7–6.1)
SODIUM SERPL-SCNC: 134 MMOL/L (ref 136–145)
WBC # BLD AUTO: 8.27 X10(3)/MCL (ref 4.5–11.5)

## 2024-12-19 PROCEDURE — 84134 ASSAY OF PREALBUMIN: CPT

## 2024-12-19 PROCEDURE — 80053 COMPREHEN METABOLIC PANEL: CPT

## 2024-12-19 PROCEDURE — 82306 VITAMIN D 25 HYDROXY: CPT

## 2024-12-19 PROCEDURE — 85652 RBC SED RATE AUTOMATED: CPT

## 2024-12-19 PROCEDURE — 83036 HEMOGLOBIN GLYCOSYLATED A1C: CPT

## 2024-12-19 PROCEDURE — 86140 C-REACTIVE PROTEIN: CPT

## 2024-12-19 PROCEDURE — 85025 COMPLETE CBC W/AUTO DIFF WBC: CPT

## 2025-05-07 ENCOUNTER — LAB REQUISITION (OUTPATIENT)
Dept: LAB | Facility: HOSPITAL | Age: 60
End: 2025-05-07
Payer: MEDICARE

## 2025-05-07 DIAGNOSIS — N18.30 CHRONIC KIDNEY DISEASE, STAGE 3 UNSPECIFIED: ICD-10-CM

## 2025-05-07 LAB
ALBUMIN SERPL-MCNC: 2.7 G/DL (ref 3.5–5)
ALBUMIN/GLOB SERPL: 0.6 RATIO (ref 1.1–2)
ALP SERPL-CCNC: 97 UNIT/L (ref 40–150)
ALT SERPL-CCNC: 10 UNIT/L (ref 0–55)
ANION GAP SERPL CALC-SCNC: 9 MEQ/L
AST SERPL-CCNC: 7 UNIT/L (ref 11–45)
BILIRUB SERPL-MCNC: 0.2 MG/DL
BUN SERPL-MCNC: 30 MG/DL (ref 8.4–25.7)
CALCIUM SERPL-MCNC: 8.8 MG/DL (ref 8.4–10.2)
CHLORIDE SERPL-SCNC: 106 MMOL/L (ref 98–107)
CO2 SERPL-SCNC: 21 MMOL/L (ref 22–29)
CREAT SERPL-MCNC: 1.38 MG/DL (ref 0.72–1.25)
CREAT/UREA NIT SERPL: 22
GFR SERPLBLD CREATININE-BSD FMLA CKD-EPI: 60 ML/MIN/1.73/M2
GLOBULIN SER-MCNC: 4.8 GM/DL (ref 2.4–3.5)
GLUCOSE SERPL-MCNC: 213 MG/DL (ref 74–100)
POTASSIUM SERPL-SCNC: 5.7 MMOL/L (ref 3.5–5.1)
PROT SERPL-MCNC: 7.5 GM/DL (ref 6.4–8.3)
SODIUM SERPL-SCNC: 136 MMOL/L (ref 136–145)

## 2025-05-07 PROCEDURE — 80053 COMPREHEN METABOLIC PANEL: CPT | Performed by: NURSE PRACTITIONER

## 2025-05-21 ENCOUNTER — LAB REQUISITION (OUTPATIENT)
Dept: LAB | Facility: HOSPITAL | Age: 60
End: 2025-05-21
Payer: MEDICARE

## 2025-05-21 DIAGNOSIS — E11.69 TYPE 2 DIABETES MELLITUS WITH OTHER SPECIFIED COMPLICATION: ICD-10-CM

## 2025-05-21 DIAGNOSIS — M46.28 OSTEOMYELITIS OF VERTEBRA, SACRAL AND SACROCOCCYGEAL REGION: ICD-10-CM

## 2025-05-21 LAB
ALBUMIN SERPL-MCNC: 2.5 G/DL (ref 3.5–5)
ALBUMIN/GLOB SERPL: 0.5 RATIO (ref 1.1–2)
ALP SERPL-CCNC: 92 UNIT/L (ref 40–150)
ALT SERPL-CCNC: 9 UNIT/L (ref 0–55)
ANION GAP SERPL CALC-SCNC: 10 MEQ/L
AST SERPL-CCNC: 11 UNIT/L (ref 11–45)
BILIRUB SERPL-MCNC: 0.2 MG/DL
BUN SERPL-MCNC: 26.3 MG/DL (ref 8.4–25.7)
CALCIUM SERPL-MCNC: 8.7 MG/DL (ref 8.4–10.2)
CHLORIDE SERPL-SCNC: 105 MMOL/L (ref 98–107)
CO2 SERPL-SCNC: 21 MMOL/L (ref 22–29)
CREAT SERPL-MCNC: 1.16 MG/DL (ref 0.72–1.25)
CREAT/UREA NIT SERPL: 23
GFR SERPLBLD CREATININE-BSD FMLA CKD-EPI: >60 ML/MIN/1.73/M2
GLOBULIN SER-MCNC: 5 GM/DL (ref 2.4–3.5)
GLUCOSE SERPL-MCNC: 158 MG/DL (ref 74–100)
POTASSIUM SERPL-SCNC: 5.3 MMOL/L (ref 3.5–5.1)
PROT SERPL-MCNC: 7.5 GM/DL (ref 6.4–8.3)
SODIUM SERPL-SCNC: 136 MMOL/L (ref 136–145)

## 2025-05-21 PROCEDURE — 80053 COMPREHEN METABOLIC PANEL: CPT | Performed by: NURSE PRACTITIONER

## 2025-07-05 ENCOUNTER — HOSPITAL ENCOUNTER (INPATIENT)
Facility: HOSPITAL | Age: 60
LOS: 4 days | Discharge: NURSING, REHAB OR LONG-TERM CARE | DRG: 570 | End: 2025-07-09
Attending: EMERGENCY MEDICINE | Admitting: INTERNAL MEDICINE
Payer: MEDICARE

## 2025-07-05 DIAGNOSIS — Z86.19 HISTORY OF INFECTION DUE TO DRUG-RESISTANT ORGANISM: ICD-10-CM

## 2025-07-05 DIAGNOSIS — S71.002A OPEN WOUND OF LEFT HIP, INITIAL ENCOUNTER: ICD-10-CM

## 2025-07-05 DIAGNOSIS — L08.9 DECUBITUS ULCER, INFECTED: ICD-10-CM

## 2025-07-05 DIAGNOSIS — N17.9 AKI (ACUTE KIDNEY INJURY): ICD-10-CM

## 2025-07-05 DIAGNOSIS — Z13.6 SCREENING FOR CARDIOVASCULAR CONDITION: ICD-10-CM

## 2025-07-05 DIAGNOSIS — L89.224 PRESSURE ULCER OF LEFT HIP, STAGE 4: Primary | Chronic | ICD-10-CM

## 2025-07-05 DIAGNOSIS — L89.314 PRESSURE INJURY OF RIGHT ISCHIUM, STAGE 4: ICD-10-CM

## 2025-07-05 DIAGNOSIS — L89.154 PRESSURE ULCER OF SACRAL REGION, STAGE 4: ICD-10-CM

## 2025-07-05 DIAGNOSIS — M86.9 OSTEOMYELITIS, UNSPECIFIED SITE, UNSPECIFIED TYPE: Chronic | ICD-10-CM

## 2025-07-05 DIAGNOSIS — L89.90 DECUBITUS ULCER, INFECTED: ICD-10-CM

## 2025-07-05 LAB
ALBUMIN SERPL-MCNC: 2.2 G/DL (ref 3.5–5)
ALBUMIN/GLOB SERPL: 0.4 RATIO (ref 1.1–2)
ALP SERPL-CCNC: 105 UNIT/L (ref 40–150)
ALT SERPL-CCNC: 8 UNIT/L (ref 0–55)
ANION GAP SERPL CALC-SCNC: 13 MEQ/L
AST SERPL-CCNC: 9 UNIT/L (ref 11–45)
BACTERIA #/AREA URNS AUTO: ABNORMAL /HPF
BASOPHILS # BLD AUTO: 0.04 X10(3)/MCL
BASOPHILS NFR BLD AUTO: 0.2 %
BILIRUB SERPL-MCNC: 0.2 MG/DL
BILIRUB UR QL STRIP.AUTO: NEGATIVE
BUN SERPL-MCNC: 53.6 MG/DL (ref 8.4–25.7)
CALCIUM SERPL-MCNC: 8.7 MG/DL (ref 8.4–10.2)
CHLORIDE SERPL-SCNC: 102 MMOL/L (ref 98–107)
CLARITY UR: ABNORMAL
CO2 SERPL-SCNC: 19 MMOL/L (ref 22–29)
COLOR UR AUTO: ABNORMAL
CREAT SERPL-MCNC: 2.78 MG/DL (ref 0.72–1.25)
CREAT/UREA NIT SERPL: 19
EOSINOPHIL # BLD AUTO: 0.11 X10(3)/MCL (ref 0–0.9)
EOSINOPHIL NFR BLD AUTO: 0.6 %
ERYTHROCYTE [DISTWIDTH] IN BLOOD BY AUTOMATED COUNT: 16.2 % (ref 11.5–17)
EST. AVERAGE GLUCOSE BLD GHB EST-MCNC: 211.6 MG/DL
GFR SERPLBLD CREATININE-BSD FMLA CKD-EPI: 26 ML/MIN/1.73/M2
GLOBULIN SER-MCNC: 5.8 GM/DL (ref 2.4–3.5)
GLUCOSE SERPL-MCNC: 189 MG/DL (ref 74–100)
GLUCOSE UR QL STRIP: NORMAL
HBA1C MFR BLD: 9 %
HCT VFR BLD AUTO: 33.7 % (ref 42–52)
HGB BLD-MCNC: 10.3 G/DL (ref 14–18)
HGB UR QL STRIP: ABNORMAL
IMM GRANULOCYTES # BLD AUTO: 0.1 X10(3)/MCL (ref 0–0.04)
IMM GRANULOCYTES NFR BLD AUTO: 0.6 %
KETONES UR QL STRIP: NEGATIVE
LACTATE SERPL-SCNC: 1.1 MMOL/L (ref 0.5–2.2)
LEUKOCYTE ESTERASE UR QL STRIP: 500
LYMPHOCYTES # BLD AUTO: 1.94 X10(3)/MCL (ref 0.6–4.6)
LYMPHOCYTES NFR BLD AUTO: 10.7 %
MCH RBC QN AUTO: 24.6 PG (ref 27–31)
MCHC RBC AUTO-ENTMCNC: 30.6 G/DL (ref 33–36)
MCV RBC AUTO: 80.6 FL (ref 80–94)
MONOCYTES # BLD AUTO: 0.93 X10(3)/MCL (ref 0.1–1.3)
MONOCYTES NFR BLD AUTO: 5.2 %
MUCOUS THREADS URNS QL MICRO: ABNORMAL /LPF
NEUTROPHILS # BLD AUTO: 14.93 X10(3)/MCL (ref 2.1–9.2)
NEUTROPHILS NFR BLD AUTO: 82.7 %
NITRITE UR QL STRIP: NEGATIVE
NRBC BLD AUTO-RTO: 0 %
OHS QRS DURATION: 108 MS
OHS QTC CALCULATION: 473 MS
PH UR STRIP: 6 [PH]
PLATELET # BLD AUTO: 401 X10(3)/MCL (ref 130–400)
PMV BLD AUTO: 10.9 FL (ref 7.4–10.4)
POCT GLUCOSE: 253 MG/DL (ref 70–110)
POTASSIUM SERPL-SCNC: 4.3 MMOL/L (ref 3.5–5.1)
PROT SERPL-MCNC: 8 GM/DL (ref 6.4–8.3)
PROT UR QL STRIP: ABNORMAL
RBC # BLD AUTO: 4.18 X10(6)/MCL (ref 4.7–6.1)
RBC #/AREA URNS AUTO: ABNORMAL /HPF
SODIUM SERPL-SCNC: 134 MMOL/L (ref 136–145)
SP GR UR STRIP.AUTO: 1.01 (ref 1–1.03)
SQUAMOUS #/AREA URNS LPF: ABNORMAL /HPF
TOBRAMYCIN PEAK SERPL-MCNC: 7.7 UG/ML (ref 4–8)
UROBILINOGEN UR STRIP-ACNC: NORMAL
WBC # BLD AUTO: 18.05 X10(3)/MCL (ref 4.5–11.5)
WBC #/AREA URNS AUTO: >100 /HPF
WBC CLUMPS UR QL AUTO: ABNORMAL

## 2025-07-05 PROCEDURE — 85025 COMPLETE CBC W/AUTO DIFF WBC: CPT | Performed by: EMERGENCY MEDICINE

## 2025-07-05 PROCEDURE — 87040 BLOOD CULTURE FOR BACTERIA: CPT | Performed by: EMERGENCY MEDICINE

## 2025-07-05 PROCEDURE — 83605 ASSAY OF LACTIC ACID: CPT | Performed by: EMERGENCY MEDICINE

## 2025-07-05 PROCEDURE — 87070 CULTURE OTHR SPECIMN AEROBIC: CPT | Performed by: EMERGENCY MEDICINE

## 2025-07-05 PROCEDURE — 96361 HYDRATE IV INFUSION ADD-ON: CPT

## 2025-07-05 PROCEDURE — 80200 ASSAY OF TOBRAMYCIN: CPT | Performed by: EMERGENCY MEDICINE

## 2025-07-05 PROCEDURE — 93005 ELECTROCARDIOGRAM TRACING: CPT

## 2025-07-05 PROCEDURE — 63600175 PHARM REV CODE 636 W HCPCS: Performed by: EMERGENCY MEDICINE

## 2025-07-05 PROCEDURE — 25000003 PHARM REV CODE 250: Performed by: EMERGENCY MEDICINE

## 2025-07-05 PROCEDURE — 63600175 PHARM REV CODE 636 W HCPCS: Performed by: INTERNAL MEDICINE

## 2025-07-05 PROCEDURE — 93010 ELECTROCARDIOGRAM REPORT: CPT | Mod: ,,, | Performed by: INTERNAL MEDICINE

## 2025-07-05 PROCEDURE — 11000001 HC ACUTE MED/SURG PRIVATE ROOM

## 2025-07-05 PROCEDURE — 99285 EMERGENCY DEPT VISIT HI MDM: CPT | Mod: 25

## 2025-07-05 PROCEDURE — 81001 URINALYSIS AUTO W/SCOPE: CPT | Performed by: EMERGENCY MEDICINE

## 2025-07-05 PROCEDURE — 83036 HEMOGLOBIN GLYCOSYLATED A1C: CPT | Performed by: INTERNAL MEDICINE

## 2025-07-05 PROCEDURE — 96367 TX/PROPH/DG ADDL SEQ IV INF: CPT

## 2025-07-05 PROCEDURE — 87086 URINE CULTURE/COLONY COUNT: CPT | Performed by: EMERGENCY MEDICINE

## 2025-07-05 PROCEDURE — 96365 THER/PROPH/DIAG IV INF INIT: CPT

## 2025-07-05 PROCEDURE — 80053 COMPREHEN METABOLIC PANEL: CPT | Performed by: EMERGENCY MEDICINE

## 2025-07-05 RX ORDER — SODIUM CHLORIDE 0.9 % (FLUSH) 0.9 %
10 SYRINGE (ML) INJECTION
Status: DISCONTINUED | OUTPATIENT
Start: 2025-07-05 | End: 2025-07-09 | Stop reason: HOSPADM

## 2025-07-05 RX ORDER — OXYBUTYNIN CHLORIDE 5 MG/1
10 TABLET, EXTENDED RELEASE ORAL DAILY
Status: DISCONTINUED | OUTPATIENT
Start: 2025-07-06 | End: 2025-07-09 | Stop reason: HOSPADM

## 2025-07-05 RX ORDER — SODIUM CHLORIDE, SODIUM LACTATE, POTASSIUM CHLORIDE, CALCIUM CHLORIDE 600; 310; 30; 20 MG/100ML; MG/100ML; MG/100ML; MG/100ML
INJECTION, SOLUTION INTRAVENOUS CONTINUOUS
Status: ACTIVE | OUTPATIENT
Start: 2025-07-05 | End: 2025-07-06

## 2025-07-05 RX ORDER — ACETAMINOPHEN 325 MG/1
650 TABLET ORAL EVERY 8 HOURS PRN
Status: DISCONTINUED | OUTPATIENT
Start: 2025-07-05 | End: 2025-07-09 | Stop reason: HOSPADM

## 2025-07-05 RX ORDER — ENOXAPARIN SODIUM 100 MG/ML
30 INJECTION SUBCUTANEOUS EVERY 24 HOURS
Status: DISCONTINUED | OUTPATIENT
Start: 2025-07-05 | End: 2025-07-09 | Stop reason: HOSPADM

## 2025-07-05 RX ORDER — GLUCAGON 1 MG
1 KIT INJECTION
Status: DISCONTINUED | OUTPATIENT
Start: 2025-07-05 | End: 2025-07-09 | Stop reason: HOSPADM

## 2025-07-05 RX ORDER — DILTIAZEM HYDROCHLORIDE 120 MG/1
120 CAPSULE, COATED, EXTENDED RELEASE ORAL DAILY
Status: DISCONTINUED | OUTPATIENT
Start: 2025-07-06 | End: 2025-07-09 | Stop reason: HOSPADM

## 2025-07-05 RX ORDER — IBUPROFEN 200 MG
24 TABLET ORAL
Status: DISCONTINUED | OUTPATIENT
Start: 2025-07-05 | End: 2025-07-09 | Stop reason: HOSPADM

## 2025-07-05 RX ORDER — TALC
6 POWDER (GRAM) TOPICAL NIGHTLY PRN
Status: DISCONTINUED | OUTPATIENT
Start: 2025-07-05 | End: 2025-07-09 | Stop reason: HOSPADM

## 2025-07-05 RX ORDER — IBUPROFEN 200 MG
16 TABLET ORAL
Status: DISCONTINUED | OUTPATIENT
Start: 2025-07-05 | End: 2025-07-09 | Stop reason: HOSPADM

## 2025-07-05 RX ORDER — INSULIN ASPART 100 [IU]/ML
0-10 INJECTION, SOLUTION INTRAVENOUS; SUBCUTANEOUS
Status: DISCONTINUED | OUTPATIENT
Start: 2025-07-05 | End: 2025-07-09 | Stop reason: HOSPADM

## 2025-07-05 RX ADMIN — SODIUM CHLORIDE, POTASSIUM CHLORIDE, SODIUM LACTATE AND CALCIUM CHLORIDE 500 ML: 600; 310; 30; 20 INJECTION, SOLUTION INTRAVENOUS at 08:07

## 2025-07-05 RX ADMIN — SODIUM CHLORIDE, POTASSIUM CHLORIDE, SODIUM LACTATE AND CALCIUM CHLORIDE: 600; 310; 30; 20 INJECTION, SOLUTION INTRAVENOUS at 11:07

## 2025-07-05 RX ADMIN — DOXYCYCLINE 100 MG: 100 INJECTION, POWDER, LYOPHILIZED, FOR SOLUTION INTRAVENOUS at 01:07

## 2025-07-05 RX ADMIN — SODIUM CHLORIDE, POTASSIUM CHLORIDE, SODIUM LACTATE AND CALCIUM CHLORIDE 2000 ML: 600; 310; 30; 20 INJECTION, SOLUTION INTRAVENOUS at 02:07

## 2025-07-05 RX ADMIN — INSULIN ASPART 3 UNITS: 100 INJECTION, SOLUTION INTRAVENOUS; SUBCUTANEOUS at 10:07

## 2025-07-05 RX ADMIN — ENOXAPARIN SODIUM 30 MG: 30 INJECTION SUBCUTANEOUS at 10:07

## 2025-07-05 RX ADMIN — TOBRAMYCIN SULFATE 130 MG: 40 INJECTION, SOLUTION INTRAMUSCULAR; INTRAVENOUS at 04:07
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
## 2025-07-05 NOTE — ED PROVIDER NOTES
Encounter Date: 7/5/2025       History     Chief Complaint   Patient presents with    Wound Infection     Pt arrives [facility] from home after home health nurse recommended pt to come to ED d/t possible sacral & left hip wound infection. Pt states wound vac was just removed and nurse noticed drainage coming from wounds. Pt denies fever. Hx of quadriplegia.      HPI  Review of patient's allergies indicates:   Allergen Reactions    Metoprolol Other (See Comments)     Pt refuses, states his arrest occurred after receiving this medication    Vancomycin analogues      Past Medical History:   Diagnosis Date    A-fib     Cardiac arrest     7/2022    Chronic skin ulcer     DM (diabetes mellitus)     Infected decubitus ulcer     Lymphedema of leg     Neurogenic bladder     Obesity, unspecified     Pressure ulcer of heel     Pressure ulcer of right foot, stage 3     Pressure ulcer of right ischium     Quadriplegia     Quadriplegic spinal paralysis      Past Surgical History:   Procedure Laterality Date    APPLICATION OF WOUND VACUUM-ASSISTED CLOSURE DEVICE Right 5/12/2023    Procedure: APPLICATION, WOUND VAC;  Surgeon: Keyonna Jean MD;  Location: [facility] OR;  Service: General;  Laterality: Right;    DEBRIDEMENT OF BUTTOCKS Right 5/12/2023    Procedure: DEBRIDEMENT, BUTTOCK;  Surgeon: Keyonna Jean MD;  Location: [facility] OR;  Service: General;  Laterality: Right;    INCISION AND DRAINAGE HIP Bilateral 8/16/2023    Procedure: INCISION AND DRAINAGE, HIP;  Surgeon: Ryan Tirado MD;  Location: [facility] OR;  Service: General;  Laterality: Bilateral;  1. Excisional debridement skin to bone, skin to bone with biopsy and debridement of hard bone at the Left hip necrotic wound 11 cm long 5-1/2 cm wide 4-1/2 cm deep upon completion of debridement   2. Excisional debridement skin to muscle right hip with debridement    PRESSURE ULCER DEBRIDEMENT N/A 2/27/2024    Procedure: DEBRIDEMENT, PRESSURE ULCER;  Surgeon: Keyonna Jean,  MD;  Location: [facility] OR;  Service: General;  Laterality: N/A;     No family history on file.  Social History[1]  Review of Systems    Physical Exam     Initial Vitals [07/05/25 1106]   BP Pulse Resp Temp SpO2   (!) 156/78 70 16 98.2 °F (36.8 °C) 98 %      MAP       --         Physical Exam          ED Course   Procedures  Labs Reviewed   COMPREHENSIVE METABOLIC PANEL - Abnormal       Result Value    Sodium 134 (*)     Potassium 4.3      Chloride 102      CO2 19 (*)     Glucose 189 (*)     Blood Urea Nitrogen 53.6 (*)     Creatinine 2.78 (*)     Calcium 8.7      Protein Total 8.0      Albumin 2.2 (*)     Globulin 5.8 (*)     Albumin/Globulin Ratio 0.4 (*)     Bilirubin Total 0.2            ALT 8      AST 9 (*)     eGFR 26      Anion Gap 13.0      BUN/Creatinine Ratio 19     URINALYSIS, REFLEX TO URINE CULTURE - Abnormal    Color, UA Light-Yellow      Appearance, UA Turbid (*)     Specific Gravity, UA 1.009      pH, UA 6.0      Protein, UA 1+ (*)     Glucose, UA Normal      Ketones, UA Negative      Blood, UA 1+ (*)     Bilirubin, UA Negative      Urobilinogen, UA Normal      Nitrites, UA Negative      Leukocyte Esterase,  (*)     RBC, UA 11-20 (*)     WBC, UA >100 (*)     WBC Clumps, UA Few (*)     Bacteria, UA Few (*)     Squamous Epithelial Cells, UA Trace      Mucous, UA Trace (*)    CBC WITH DIFFERENTIAL - Abnormal    WBC 18.05 (*)     RBC 4.18 (*)     Hgb 10.3 (*)     Hct 33.7 (*)     MCV 80.6      MCH 24.6 (*)     MCHC 30.6 (*)     RDW 16.2      Platelet 401 (*)     MPV 10.9 (*)     Neut % 82.7      Lymph % 10.7      Mono % 5.2      Eos % 0.6      Basophil % 0.2      Imm Grans % 0.6      Neut # 14.93 (*)     Lymph # 1.94      Mono # 0.93      Eos # 0.11      Baso # 0.04      Imm Gran # 0.10 (*)     NRBC% 0.0     LACTIC ACID, PLASMA - Normal    Lactic Acid Level 1.1     BLOOD CULTURE [facility]   BLOOD CULTURE [facility]   WOUND CULTURE ([facility])   WOUND CULTURE ([facility])   CULTURE, URINE   CBC W/ AUTO DIFFERENTIAL     Narrative:     The following orders were created for panel order CBC auto differential.  Procedure                               Abnormality         Status                     ---------                               -----------         ------                     CBC with Differential[[id number]]       Abnormal            Final result                 Please view results for these tests on the individual orders.   TOBRAMYCIN, PEAK     EKG Readings: (Independently Interpreted)   Initial Reading: No STEMI. Heart Rate: 72. Axis: Normal. Clinical Impression: Normal Sinus Rhythm   07/05/2025 @ 1217     ECG Results              EKG 12-lead (Final result)        Collection Time Result Time QRS Duration [facility] QTC Calculation    07/05/25 12:17:31 07/05/25 12:47:42 108 473                     Final result by Interface, Lab In [facility] (07/05/25 12:47:50)                   Narrative:    Test Reason : Z13.6,    Vent. Rate :  72 BPM     Atrial Rate :  72 BPM     P-R Int : 128 ms          QRS Dur : 108 ms      QT Int : 432 ms       P-R-T Axes :  25  81  30 degrees    QTcB Int : 473 ms    Normal sinus rhythm  Incomplete right bundle branch block  Borderline Abnormal ECG  Confirmed by Michael Corley ([id number]) on 7/5/2025 12:47:41 PM    Referred By:            Confirmed By: Michael Corley                                  Imaging Results              CT Pelvis Without Contrast (Final result)  Result time 07/05/25 15:22:20   Procedure changed from CT Pelvis With IV Contrast NO Oral Contrast     Final result by Florin Rausch MD (07/05/25 15:22:20)                   Impression:      Several pelvic ulcers.  No defined fluid collection on noncontrast CT.    Areas of sclerosis in the coccyx and right ischium are likely related to osteitis, but likely subacute to chronic given sclerotic component.      Electronically signed by: Florin Rausch  Date:    07/05/2025  Time:    15:22               Narrative:    EXAMINATION:  CT PELVIS WITHOUT  CONTRAST    CLINICAL HISTORY:  extensive sacral decubitus ulcers, eval for abscesses/osteo;    TECHNIQUE:  CT imaging of the pelvis without IV contrast.  Axial, coronal and sagittal images reviewed.  Dose length product 544 mGycm. Automatic exposure control utilized to limit radiation dose.    COMPARISON:  Abdominal CT 08/16/2023    FINDINGS:  Inferior sacral measuring 7 cm in diameter and about 3 cm in depth.  Another ulcer extends towards the left greater trochanter and measures about 4 cm in depth with diameter of 8 cm.  Small volume fluid along the anterior portion of this ulcer tract.  Another area of ulceration extends toward the right greater trochanter and thin medially toward the right ischium.  It measures 12 cm in diameter.  No significant fluid identified in this region.  Inflammatory changes also extend inferiorly from the left inferior pubic ramus.  These are not completely included in the field of view, but no defined fluid collection here either.    Sclerosis of the inferior coccyx adjacent to the sacral ulcer.  There is additional sclerosis of the right ischium.  Heterotopic ossification at both hips.  Bladder decompressed with a suprapubic catheter.                                       X-Ray Chest AP Portable (Final result)  Result time 07/05/25 13:10:18      Final result by Lilia Geiger MD (07/05/25 13:10:18)                   Impression:      No acute abnormality of the chest.      Electronically signed by: Lilia Geiger  Date:    07/05/2025  Time:    13:10               Narrative:    EXAMINATION:  XR CHEST AP PORTABLE    CLINICAL HISTORY:  Sepsis;    COMPARISON:  Chest x-ray dated 10/15/2024    FINDINGS:  The heart is normal in size.  The lungs are clear.  There is no pleural effusion or visible pneumothorax.                                       Medications   tobramycin - pharmacy to dose (has no administration in time range)   doxycycline 100 mg in D5W 100 mL IVPB (MB+) (0 mg  Intravenous Stopped 25 1440)   tobramycin (NEBCIN) 130 mg in D5W 100 mL IVPB (130 mg Intravenous Trough Due As Scheduled Before Dose 25 0300)   lactated ringers bolus 2,000 mL (0 mLs Intravenous Stopped 25 1519)     Medical Decision Making  Amount and/or Complexity of Data Reviewed  Labs: ordered.  Radiology: ordered.    Risk  Decision regarding hospitalization.      ED assessment:    ***    Differential diagnosis (including but not limited to):   ***    ED management: ***    My independent radiology interpretation: ***  Point of care US (independently performed and interpreted): ***  Decision rules/clinical scoring: ***    Amount and/or Complexity of Data Reviewed  Independent historian: {MDMINDEPENDENTHISTORIAN:04022}   Summary of history: ***  External data reviewed: {MDMEXTERNALDATAREVIEWED:95439}  Summary of data reviewed: ***  Risk and benefits of testing: discussed   {MDMLABS:35965}  {MDMRAD:33788}  {MDMEC}  Discussion of management or test interpretation with external provider(s): {MDMEXTERNALPROVIDER:48871}   Summary of discussion: ***    Risk  {MDMRISK:78136}    Critical Care  {MDMCRITICALCARE:99972}    IJennifer MD personally performed the history, PE, MDM, and procedures as documented above and agree with the scribe's documentation.                                     Clinical Impression:  Final diagnoses:  [Z13.6] Screening for cardiovascular condition  [L89.90, L08.9] Decubitus ulcer, infected  [N17.9] GISSEL (acute kidney injury)  [Z86.19] History of infection due to drug-resistant organism          ED Disposition Condition    Admit                       [1]   Social History  Tobacco Use    Smoking status: Never    Smokeless tobacco: Never   Substance Use Topics    Alcohol use: Never    Drug use: Never

## 2025-07-06 LAB
ALBUMIN SERPL-MCNC: 1.6 G/DL (ref 3.5–5)
ALBUMIN/GLOB SERPL: 0.4 RATIO (ref 1.1–2)
ALP SERPL-CCNC: 89 UNIT/L (ref 40–150)
ALT SERPL-CCNC: 5 UNIT/L (ref 0–55)
ANION GAP SERPL CALC-SCNC: 8 MEQ/L
AST SERPL-CCNC: 5 UNIT/L (ref 11–45)
BASOPHILS # BLD AUTO: 0.04 X10(3)/MCL
BASOPHILS NFR BLD AUTO: 0.3 %
BILIRUB SERPL-MCNC: 0.2 MG/DL
BUN SERPL-MCNC: 49.4 MG/DL (ref 8.4–25.7)
CALCIUM SERPL-MCNC: 7.7 MG/DL (ref 8.4–10.2)
CHLORIDE SERPL-SCNC: 103 MMOL/L (ref 98–107)
CK SERPL-CCNC: 134 U/L (ref 30–200)
CO2 SERPL-SCNC: 21 MMOL/L (ref 22–29)
CREAT SERPL-MCNC: 2.21 MG/DL (ref 0.72–1.25)
CREAT/UREA NIT SERPL: 22
EOSINOPHIL # BLD AUTO: 0.1 X10(3)/MCL (ref 0–0.9)
EOSINOPHIL NFR BLD AUTO: 0.7 %
ERYTHROCYTE [DISTWIDTH] IN BLOOD BY AUTOMATED COUNT: 16.4 % (ref 11.5–17)
GFR SERPLBLD CREATININE-BSD FMLA CKD-EPI: 34 ML/MIN/1.73/M2
GLOBULIN SER-MCNC: 4.5 GM/DL (ref 2.4–3.5)
GLUCOSE SERPL-MCNC: 335 MG/DL (ref 74–100)
HCT VFR BLD AUTO: 20.1 % (ref 42–52)
HGB BLD-MCNC: 6.3 G/DL (ref 14–18)
IMM GRANULOCYTES # BLD AUTO: 0.08 X10(3)/MCL (ref 0–0.04)
IMM GRANULOCYTES NFR BLD AUTO: 0.6 %
LYMPHOCYTES # BLD AUTO: 1.81 X10(3)/MCL (ref 0.6–4.6)
LYMPHOCYTES NFR BLD AUTO: 13.5 %
MCH RBC QN AUTO: 24.8 PG (ref 27–31)
MCHC RBC AUTO-ENTMCNC: 31.3 G/DL (ref 33–36)
MCV RBC AUTO: 79.1 FL (ref 80–94)
MONOCYTES # BLD AUTO: 0.62 X10(3)/MCL (ref 0.1–1.3)
MONOCYTES NFR BLD AUTO: 4.6 %
NEUTROPHILS # BLD AUTO: 10.76 X10(3)/MCL (ref 2.1–9.2)
NEUTROPHILS NFR BLD AUTO: 80.3 %
NRBC BLD AUTO-RTO: 0 %
PLATELET # BLD AUTO: 327 X10(3)/MCL (ref 130–400)
PMV BLD AUTO: 11.4 FL (ref 7.4–10.4)
POCT GLUCOSE: 371 MG/DL (ref 70–110)
POTASSIUM SERPL-SCNC: 4.4 MMOL/L (ref 3.5–5.1)
PROT SERPL-MCNC: 6.1 GM/DL (ref 6.4–8.3)
RBC # BLD AUTO: 2.54 X10(6)/MCL (ref 4.7–6.1)
SODIUM SERPL-SCNC: 132 MMOL/L (ref 136–145)
TOBRAMYCIN TROUGH SERPL-MCNC: 2 UG/ML (ref 0.3–2)
WBC # BLD AUTO: 13.41 X10(3)/MCL (ref 4.5–11.5)

## 2025-07-06 PROCEDURE — 25000003 PHARM REV CODE 250: Performed by: EMERGENCY MEDICINE

## 2025-07-06 PROCEDURE — 63600175 PHARM REV CODE 636 W HCPCS: Performed by: EMERGENCY MEDICINE

## 2025-07-06 PROCEDURE — 63600175 PHARM REV CODE 636 W HCPCS: Performed by: INTERNAL MEDICINE

## 2025-07-06 PROCEDURE — 25000003 PHARM REV CODE 250: Performed by: INTERNAL MEDICINE

## 2025-07-06 PROCEDURE — 82550 ASSAY OF CK (CPK): CPT | Performed by: INTERNAL MEDICINE

## 2025-07-06 PROCEDURE — 21400001 HC TELEMETRY ROOM

## 2025-07-06 PROCEDURE — 36415 COLL VENOUS BLD VENIPUNCTURE: CPT | Performed by: EMERGENCY MEDICINE

## 2025-07-06 PROCEDURE — 85025 COMPLETE CBC W/AUTO DIFF WBC: CPT | Performed by: INTERNAL MEDICINE

## 2025-07-06 PROCEDURE — 80053 COMPREHEN METABOLIC PANEL: CPT | Performed by: INTERNAL MEDICINE

## 2025-07-06 PROCEDURE — 36415 COLL VENOUS BLD VENIPUNCTURE: CPT | Performed by: INTERNAL MEDICINE

## 2025-07-06 PROCEDURE — 80200 ASSAY OF TOBRAMYCIN: CPT | Performed by: EMERGENCY MEDICINE

## 2025-07-06 RX ORDER — INSULIN GLARGINE 100 [IU]/ML
15 INJECTION, SOLUTION SUBCUTANEOUS NIGHTLY
Status: DISCONTINUED | OUTPATIENT
Start: 2025-07-06 | End: 2025-07-07

## 2025-07-06 RX ORDER — MEROPENEM 1 G/1
1 INJECTION, POWDER, FOR SOLUTION INTRAVENOUS
Status: DISCONTINUED | OUTPATIENT
Start: 2025-07-06 | End: 2025-07-09 | Stop reason: HOSPADM

## 2025-07-06 RX ORDER — MUPIROCIN 20 MG/G
OINTMENT TOPICAL 2 TIMES DAILY
Status: DISCONTINUED | OUTPATIENT
Start: 2025-07-06 | End: 2025-07-09 | Stop reason: HOSPADM

## 2025-07-06 RX ADMIN — TOBRAMYCIN SULFATE 130 MG: 40 INJECTION, SOLUTION INTRAMUSCULAR; INTRAVENOUS at 10:07

## 2025-07-06 RX ADMIN — ENOXAPARIN SODIUM 30 MG: 30 INJECTION SUBCUTANEOUS at 04:07

## 2025-07-06 RX ADMIN — DILTIAZEM HYDROCHLORIDE 120 MG: 120 CAPSULE, COATED, EXTENDED RELEASE ORAL at 09:07

## 2025-07-06 RX ADMIN — MUPIROCIN: 20 OINTMENT TOPICAL at 09:07

## 2025-07-06 RX ADMIN — MEROPENEM 1 G: 1 INJECTION, POWDER, FOR SOLUTION INTRAVENOUS at 02:07

## 2025-07-06 RX ADMIN — MUPIROCIN: 20 OINTMENT TOPICAL at 10:07

## 2025-07-06 RX ADMIN — INSULIN ASPART 5 UNITS: 100 INJECTION, SOLUTION INTRAVENOUS; SUBCUTANEOUS at 10:07

## 2025-07-06 RX ADMIN — INSULIN ASPART 8 UNITS: 100 INJECTION, SOLUTION INTRAVENOUS; SUBCUTANEOUS at 06:07

## 2025-07-06 RX ADMIN — SODIUM CHLORIDE, POTASSIUM CHLORIDE, SODIUM LACTATE AND CALCIUM CHLORIDE: 600; 310; 30; 20 INJECTION, SOLUTION INTRAVENOUS at 07:07

## 2025-07-06 RX ADMIN — OXYBUTYNIN CHLORIDE 10 MG: 5 TABLET, EXTENDED RELEASE ORAL at 09:07

## 2025-07-06 RX ADMIN — INSULIN ASPART 4 UNITS: 100 INJECTION, SOLUTION INTRAVENOUS; SUBCUTANEOUS at 06:07

## 2025-07-06 RX ADMIN — DAPTOMYCIN 600 MG: 500 INJECTION, POWDER, LYOPHILIZED, FOR SOLUTION INTRAVENOUS at 03:07

## 2025-07-06 RX ADMIN — DOXYCYCLINE 100 MG: 100 INJECTION, POWDER, LYOPHILIZED, FOR SOLUTION INTRAVENOUS at 01:07

## 2025-07-06 RX ADMIN — INSULIN GLARGINE 15 UNITS: 100 INJECTION, SOLUTION SUBCUTANEOUS at 10:07

## 2025-07-06 RX ADMIN — SODIUM CHLORIDE, POTASSIUM CHLORIDE, SODIUM LACTATE AND CALCIUM CHLORIDE: 600; 310; 30; 20 INJECTION, SOLUTION INTRAVENOUS at 10:07
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
## 2025-07-06 NOTE — PROGRESS NOTES
[facility] [address] [facility]  Wound Care    Patient Name:  Antonio Galvan II   MRN:  [id number]  Date: 7/6/2025  Diagnosis: Decubitus ulcer, infected    History:     Past Medical History:   Diagnosis Date    A-fib     Cardiac arrest     7/2022    Chronic skin ulcer     DM (diabetes mellitus)     Infected decubitus ulcer     Lymphedema of leg     Neurogenic bladder     Obesity, unspecified     Pressure ulcer of heel     Pressure ulcer of right foot, stage 3     Pressure ulcer of right ischium     Quadriplegia     Quadriplegic spinal paralysis        Social History[1]    Precautions:     Allergies as of 07/05/2025 - Reviewed 07/05/2025   Allergen Reaction Noted    Metoprolol Other (See Comments) 03/20/2024    Vancomycin analogues  07/05/2025       [facility] Assessment Details/Treatment   Initial visit regarding consult for open wounds at sacrum and left hip, assisted to assess by assigned nurse and [place] wound care tech, as patient is significantly contracted of extremities. Wounds at sacrum and right ischium and left trochanter , suggestive of stage IV pressure injuries present on admission , are cleansed with VASHE and filled with VASHE moist gauze under dry gauze and ABD pads, all periwound skin is protected with no sting CAVILON skin prep liberally .Bilateral heels are protected with bordered foam dressings and placed in heel lift boots, pillows are used to off load contractures and foam elbow protectors are uses. Noted significant area of deep tissue injury versus necrosis and slough at periwound area of sacral wound.   07/06/25 1000   Pressure Injury Prevention    Heel protection technique Foam dressing  (Heel lift boots also.)   Heel preventative measures Apply        Incision/Site 08/16/23 2011 Left Hip lateral   Date First Assessed/Time First Assessed: 08/16/23 2011   Side: Left  Location: Hip  Orientation: lateral   Wound Image    Dressing Appearance Moist drainage;Saturated   Drainage Amount  Large   Drainage Characteristics/Odor Creamy;Yellow;Malodorous   Appearance Pink;Red   Red (%), Wound Tissue Color 100 %   Periwound Area Intact;Dry   Wound Edges Rolled/closed   Wound Length (cm) 4 cm   Wound Width (cm) 3 cm   Wound Depth (cm) 1 cm   Wound Volume (cm^3) 6.283 cm^3   Wound Surface Area (cm^2) 9.42 cm^2   Undermining (depth (cm)/location) 4 cm , all around   Care Antimicrobial agent   Dressing Changed;Gauze, wet to dry  (VAshe moist gauze to fill wound bed under dry gauze and abd pad secured with cloth tape.)   Periwound Care Skin barrier film applied   Off Loading Wedge  (Requested low air loss bed.)   Dressing Change Due 07/06/25        Altered Skin Integrity 10/11/24 1241 Sacral spine Full thickness tissue loss with exposed bone, tendon, or muscle. Often includes undermining and tunneling. May extend into muscle and/or supporting structures.   Date First Assessed/Time First Assessed: 10/11/24 1241   Altered Skin Integrity Present on Admission - Did Patient arrive to the hospital with altered skin?: yes  Location: Sacral spine  Description of Altered Skin Integrity: Full thickness tissue los...   Wound Image    Description of Altered Skin Integrity Full thickness tissue loss with exposed bone, tendon, or muscle. Often includes undermining and tunneling. May extend into muscle and/or supporting structures.   Dressing Appearance Saturated;Moist drainage   Drainage Amount Copious   Drainage Characteristics/Odor Creamy;Yellow;Malodorous   Appearance Pink;Red;Yellow   Tissue loss description Full thickness   Periwound Area Macerated;Indurated;Maroon;Denuded;Redness  (large area of deep tissue injury)   Wound Edges Defined   Wound Length (cm) 7 cm   Wound Width (cm) 6 cm   Wound Depth (cm) 4 cm   Wound Volume (cm^3) 87.965 cm^3   Wound Surface Area (cm^2) 32.99 cm^2   Undermining (depth (cm)/location) up to 3cm, all around   Care Antimicrobial agent   Dressing Changed;Gauze;Gauze, wet to dry  (wound  filled with VASHE moist gauze under dry gauze and ABD pad secured with cloth tape.)   Periwound Care Skin barrier film applied   Off Loading   (requested low air loss bed.)   Dressing Change Due 07/06/25        Wound 10/11/24 Pressure Injury Right Ischial tuberosity   Date First Assessed: 10/11/24   Present on Original Admission: Yes  Primary Wound Type: Pressure Injury  Side: Right  Location: Ischial tuberosity   Wound Image    Pressure Injury Stage 4   Drainage Amount Large   Drainage Characteristics/Odor Yellow;Malodorous   Appearance Red;Yellow   Tissue loss description Full thickness   Periwound Area Intact;Macerated;Redness;Moist   Wound Edges Defined   Wound Length (cm) 5 cm   Wound Width (cm) 10 cm   Wound Depth (cm) 2 cm   Wound Volume (cm^3) 52.36 cm^3   Wound Surface Area (cm^2) 39.27 cm^2   Supply Surface Area (L x W) 50 sq cm   Care Antimicrobial agent   Dressing Changed;Gauze, wet to dry  (VASHE moist gauze under dry gauze and ABD pad secured with cloth tape.)   Periwound Care Skin barrier film applied  (SKin barrier film applied liberally to periwound skin and covered with ABD pad.)   Dressing Change Due 07/06/25   Skin Interventions   Pressure Reduction Devices foam padding utilized;pressure-redistributing mattress utilized  (foam padding used over elbows, and contractures of extremities off loaded with pillows , requested a low air loss bed.)       Recommendations made to primary team Kyleigh MADDOX, for local wound care measures and pressure injury prevention measures to include an immerse low air loss bed.  Orders placed.     07/06/2025         [1]   Social History  Socioeconomic History    Marital status: Single   Tobacco Use    Smoking status: Never    Smokeless tobacco: Never   Substance and Sexual Activity    Alcohol use: Never    Drug use: Never    Sexual activity: Not Currently     Social Drivers of Health     Financial Resource Strain: Low Risk  (7/5/2025)    Overall Financial Resource Strain  (CARDIA)     Difficulty of Paying Living Expenses: Not hard at all   Food Insecurity: No Food Insecurity (7/5/2025)    Hunger Vital Sign     Worried About Running Out of Food in the Last Year: Never true     Ran Out of Food in the Last Year: Never true   Transportation Needs: No Transportation Needs (7/5/2025)    PRAPARE - Transportation     Lack of Transportation (Medical): No     Lack of Transportation (Non-Medical): No   Physical Activity: Inactive (2/27/2024)    Exercise Vital Sign     Days of Exercise per Week: 0 days     Minutes of Exercise per Session: 0 min   Stress: No Stress Concern Present (7/5/2025)    [employer] [place] [employer] - Occupational Stress Questionnaire     Feeling of Stress : Not at all   Housing Stability: Low Risk  (7/5/2025)    Housing Stability Vital Sign     Unable to Pay for Housing in the Last Year: No     Homeless in the Last Year: No

## 2025-07-07 LAB
ABO + RH BLD: NORMAL
ALBUMIN SERPL-MCNC: 1.6 G/DL (ref 3.5–5)
ALBUMIN/GLOB SERPL: 0.3 RATIO (ref 1.1–2)
ALP SERPL-CCNC: 97 UNIT/L (ref 40–150)
ALT SERPL-CCNC: 6 UNIT/L (ref 0–55)
ANION GAP SERPL CALC-SCNC: 9 MEQ/L
AST SERPL-CCNC: 6 UNIT/L (ref 11–45)
BASOPHILS # BLD AUTO: 0.05 X10(3)/MCL
BASOPHILS NFR BLD AUTO: 0.4 %
BILIRUB SERPL-MCNC: 0.2 MG/DL
BLD PROD TYP BPU: NORMAL
BLOOD UNIT EXPIRATION DATE: NORMAL
BLOOD UNIT TYPE CODE: 7300
BUN SERPL-MCNC: 39.8 MG/DL (ref 8.4–25.7)
CALCIUM SERPL-MCNC: 7.8 MG/DL (ref 8.4–10.2)
CHLORIDE SERPL-SCNC: 105 MMOL/L (ref 98–107)
CO2 SERPL-SCNC: 21 MMOL/L (ref 22–29)
CREAT SERPL-MCNC: 1.92 MG/DL (ref 0.72–1.25)
CREAT/UREA NIT SERPL: 21
CROSSMATCH INTERPRETATION: NORMAL
DISPENSE STATUS: NORMAL
EOSINOPHIL # BLD AUTO: 0.2 X10(3)/MCL (ref 0–0.9)
EOSINOPHIL NFR BLD AUTO: 1.5 %
ERYTHROCYTE [DISTWIDTH] IN BLOOD BY AUTOMATED COUNT: 16.5 % (ref 11.5–17)
FERRITIN SERPL-MCNC: 388.67 NG/ML (ref 21.81–274.66)
FOLATE SERPL-MCNC: 6.9 NG/ML (ref 7–31.4)
GFR SERPLBLD CREATININE-BSD FMLA CKD-EPI: 40 ML/MIN/1.73/M2
GLOBULIN SER-MCNC: 4.6 GM/DL (ref 2.4–3.5)
GLUCOSE SERPL-MCNC: 367 MG/DL (ref 74–100)
GROUP & RH: NORMAL
HCT VFR BLD AUTO: 23.1 % (ref 42–52)
HGB BLD-MCNC: 7.1 G/DL (ref 14–18)
IMM GRANULOCYTES # BLD AUTO: 0.09 X10(3)/MCL (ref 0–0.04)
IMM GRANULOCYTES NFR BLD AUTO: 0.7 %
INDIRECT COOMBS: NORMAL
IRON SATN MFR SERPL: 11 % (ref 20–50)
IRON SERPL-MCNC: 11 UG/DL (ref 65–175)
LYMPHOCYTES # BLD AUTO: 1.65 X10(3)/MCL (ref 0.6–4.6)
LYMPHOCYTES NFR BLD AUTO: 12.8 %
MCH RBC QN AUTO: 24.2 PG (ref 27–31)
MCHC RBC AUTO-ENTMCNC: 30.7 G/DL (ref 33–36)
MCV RBC AUTO: 78.8 FL (ref 80–94)
MONOCYTES # BLD AUTO: 0.84 X10(3)/MCL (ref 0.1–1.3)
MONOCYTES NFR BLD AUTO: 6.5 %
NEUTROPHILS # BLD AUTO: 10.1 X10(3)/MCL (ref 2.1–9.2)
NEUTROPHILS NFR BLD AUTO: 78.1 %
NRBC BLD AUTO-RTO: 0 %
PLATELET # BLD AUTO: 343 X10(3)/MCL (ref 130–400)
PMV BLD AUTO: 10.8 FL (ref 7.4–10.4)
POCT GLUCOSE: 303 MG/DL (ref 70–110)
POCT GLUCOSE: 313 MG/DL (ref 70–110)
POCT GLUCOSE: 320 MG/DL (ref 70–110)
POTASSIUM SERPL-SCNC: 3.9 MMOL/L (ref 3.5–5.1)
PREALB SERPL-MCNC: 5.9 MG/DL (ref 18–45)
PROT SERPL-MCNC: 6.2 GM/DL (ref 6.4–8.3)
RBC # BLD AUTO: 2.93 X10(6)/MCL (ref 4.7–6.1)
RET# (OHS): 0.04 X10E6/UL (ref 0.03–0.1)
RETICULOCYTE COUNT AUTOMATED (OLG): 1.25 % (ref 1.1–2.1)
SODIUM SERPL-SCNC: 135 MMOL/L (ref 136–145)
SPECIMEN OUTDATE: NORMAL
T4 FREE SERPL-MCNC: 0.78 NG/DL (ref 0.7–1.48)
TIBC SERPL-MCNC: 86 UG/DL (ref 60–240)
TIBC SERPL-MCNC: 97 UG/DL (ref 250–450)
TOBRAMYCIN TROUGH SERPL-MCNC: 2.6 UG/ML (ref 0.3–2)
TRANSFERRIN SERPL-MCNC: 96 MG/DL (ref 174–364)
UNIT NUMBER: NORMAL
VIT B12 SERPL-MCNC: 661 PG/ML (ref 213–816)
WBC # BLD AUTO: 12.93 X10(3)/MCL (ref 4.5–11.5)

## 2025-07-07 PROCEDURE — 83540 ASSAY OF IRON: CPT | Performed by: INTERNAL MEDICINE

## 2025-07-07 PROCEDURE — 85045 AUTOMATED RETICULOCYTE COUNT: CPT | Performed by: INTERNAL MEDICINE

## 2025-07-07 PROCEDURE — 36415 COLL VENOUS BLD VENIPUNCTURE: CPT | Performed by: INTERNAL MEDICINE

## 2025-07-07 PROCEDURE — 36415 COLL VENOUS BLD VENIPUNCTURE: CPT | Performed by: EMERGENCY MEDICINE

## 2025-07-07 PROCEDURE — 84439 ASSAY OF FREE THYROXINE: CPT | Performed by: INTERNAL MEDICINE

## 2025-07-07 PROCEDURE — 36430 TRANSFUSION BLD/BLD COMPNT: CPT

## 2025-07-07 PROCEDURE — 86901 BLOOD TYPING SEROLOGIC RH(D): CPT | Performed by: INTERNAL MEDICINE

## 2025-07-07 PROCEDURE — P9016 RBC LEUKOCYTES REDUCED: HCPCS | Performed by: INTERNAL MEDICINE

## 2025-07-07 PROCEDURE — 82607 VITAMIN B-12: CPT | Performed by: INTERNAL MEDICINE

## 2025-07-07 PROCEDURE — 63600175 PHARM REV CODE 636 W HCPCS: Performed by: INTERNAL MEDICINE

## 2025-07-07 PROCEDURE — 84134 ASSAY OF PREALBUMIN: CPT | Performed by: EMERGENCY MEDICINE

## 2025-07-07 PROCEDURE — 99223 1ST HOSP IP/OBS HIGH 75: CPT | Mod: GC,,, | Performed by: SURGERY

## 2025-07-07 PROCEDURE — 85025 COMPLETE CBC W/AUTO DIFF WBC: CPT | Performed by: INTERNAL MEDICINE

## 2025-07-07 PROCEDURE — 86923 COMPATIBILITY TEST ELECTRIC: CPT | Performed by: INTERNAL MEDICINE

## 2025-07-07 PROCEDURE — 99223 1ST HOSP IP/OBS HIGH 75: CPT | Mod: ,,, | Performed by: EMERGENCY MEDICINE

## 2025-07-07 PROCEDURE — 80053 COMPREHEN METABOLIC PANEL: CPT | Performed by: INTERNAL MEDICINE

## 2025-07-07 PROCEDURE — 25000003 PHARM REV CODE 250: Performed by: INTERNAL MEDICINE

## 2025-07-07 PROCEDURE — 21400001 HC TELEMETRY ROOM

## 2025-07-07 PROCEDURE — 82728 ASSAY OF FERRITIN: CPT | Performed by: INTERNAL MEDICINE

## 2025-07-07 PROCEDURE — 82746 ASSAY OF FOLIC ACID SERUM: CPT | Performed by: INTERNAL MEDICINE

## 2025-07-07 PROCEDURE — 80200 ASSAY OF TOBRAMYCIN: CPT | Performed by: INTERNAL MEDICINE

## 2025-07-07 RX ORDER — INSULIN GLARGINE 100 [IU]/ML
20 INJECTION, SOLUTION SUBCUTANEOUS NIGHTLY
Status: DISCONTINUED | OUTPATIENT
Start: 2025-07-07 | End: 2025-07-08

## 2025-07-07 RX ADMIN — MUPIROCIN: 20 OINTMENT TOPICAL at 10:07

## 2025-07-07 RX ADMIN — MEROPENEM 1 G: 1 INJECTION, POWDER, FOR SOLUTION INTRAVENOUS at 02:07

## 2025-07-07 RX ADMIN — INSULIN ASPART 8 UNITS: 100 INJECTION, SOLUTION INTRAVENOUS; SUBCUTANEOUS at 04:07

## 2025-07-07 RX ADMIN — DILTIAZEM HYDROCHLORIDE 120 MG: 120 CAPSULE, COATED, EXTENDED RELEASE ORAL at 10:07

## 2025-07-07 RX ADMIN — OXYBUTYNIN CHLORIDE 10 MG: 5 TABLET, EXTENDED RELEASE ORAL at 10:07

## 2025-07-07 RX ADMIN — Medication 6 MG: at 11:07

## 2025-07-07 RX ADMIN — ENOXAPARIN SODIUM 30 MG: 30 INJECTION SUBCUTANEOUS at 04:07

## 2025-07-07 RX ADMIN — MUPIROCIN: 20 OINTMENT TOPICAL at 09:07

## 2025-07-07 RX ADMIN — INSULIN GLARGINE 20 UNITS: 100 INJECTION, SOLUTION SUBCUTANEOUS at 09:07

## 2025-07-07 RX ADMIN — INSULIN ASPART 10 UNITS: 100 INJECTION, SOLUTION INTRAVENOUS; SUBCUTANEOUS at 07:07

## 2025-07-07 NOTE — PLAN OF CARE
Patient lives alone with 24/7 caregivers from [facility]. DME in home: hospital bed, wheelchair, slide board. Patient has wound vac from [email or web address] currently at home. Patient is current with [facility]-updates sent via epic. Patient has wheelchair van. Patient may benefit from LTAC vs Swing bed.     PCP: Jennifer Fernando NP  Pharmacy: [facility]        07/07/25 1008   Discharge Assessment   Assessment Type Discharge Planning Assessment   Confirmed/corrected address, phone number and insurance Yes   Confirmed Demographics Correct on Facesheet   Source of Information patient;family   People in Home alone  (24/7 Caregivers)   Do you expect to return to your current living situation? Yes   Do you have help at home or someone to help you manage your care at home? Yes   Prior to hospitilization cognitive status: Alert/Oriented   Current cognitive status: Alert/Oriented   Home Accessibility wheelchair accessible   Home Layout Able to live on 1st floor   Equipment Currently Used at Home other (see comments);wheelchair;hospital bed;slide board  (Wound Vac from [employer])   Readmission within 30 days? No   Patient currently being followed by outpatient case management? No   Do you currently have service(s) that help you manage your care at home? Yes   How Many hours does patient receive services 3.43   Name and Contact number of agency [facility] and [facility]   Is the pt/caregiver preference to resume services with current agency Yes   Do you take prescription medications? Yes   How do you get to doctors appointments? family or friend will provide   Discharge Plan A Long-term acute care facility (LTAC)   Discharge Plan B Skilled Nursing Facility   DME Needed Upon Discharge  none   Discharge Plan discussed with: Patient;Parent(s)   Transition of Care Barriers None

## 2025-07-07 NOTE — NURSING
Spoke to Dr. Arita regarding patients admission to the hospital. Dr. Arita stated he will be by to see the patient today. Further orders pending MD's arrival and assessment of patient.
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
## 2025-07-07 NOTE — SUBJECTIVE & OBJECTIVE
Scheduled Meds:   DAPTOmycin (CUBICIN) IV (PEDS and ADULTS)  600 mg Intravenous Q48H    diltiaZEM  120 mg Oral Daily    enoxparin  30 mg Subcutaneous Daily    insulin glargine U-100  15 Units Subcutaneous QHS    meropenem IV (PEDS and ADULTS)  1 g Intravenous Q12H    mupirocin   Nasal BID    oxybutynin  10 mg Oral Daily     Continuous Infusions:  PRN Meds:  Current Facility-Administered Medications:     acetaminophen, 650 mg, Oral, Q8H PRN    dextrose 50%, 12.5 g, Intravenous, PRN    dextrose 50%, 25 g, Intravenous, PRN    glucagon (human recombinant), 1 mg, Intramuscular, PRN    glucose, 16 g, Oral, PRN    glucose, 24 g, Oral, PRN    insulin aspart U-100, 0-10 Units, Subcutaneous, QID (AC + HS) PRN    melatonin, 6 mg, Oral, Nightly PRN    sodium chloride 0.9%, 10 mL, Intravenous, PRN    Review of patient's allergies indicates:   Allergen Reactions    Metoprolol Other (See Comments)     Pt refuses, states his arrest occurred after receiving this medication    Vancomycin analogues         Past Medical History:   Diagnosis Date    A-fib     Cardiac arrest     7/2022    Chronic skin ulcer     DM (diabetes mellitus)     Infected decubitus ulcer     Lymphedema of leg     Neurogenic bladder     Obesity, unspecified     Pressure ulcer of heel     Pressure ulcer of right foot, stage 3     Pressure ulcer of right ischium     Quadriplegia     Quadriplegic spinal paralysis      Past Surgical History:   Procedure Laterality Date    APPLICATION OF WOUND VACUUM-ASSISTED CLOSURE DEVICE Right 5/12/2023    Procedure: APPLICATION, WOUND VAC;  Surgeon: Keyonna Jean MD;  Location: [facility] OR;  Service: General;  Laterality: Right;    DEBRIDEMENT OF BUTTOCKS Right 5/12/2023    Procedure: DEBRIDEMENT, BUTTOCK;  Surgeon: Keyonna Jean MD;  Location: [facility] OR;  Service: General;  Laterality: Right;    INCISION AND DRAINAGE HIP Bilateral 8/16/2023    Procedure: INCISION AND DRAINAGE, HIP;  Surgeon: Ryan Tirado MD;   Location: [facility] OR;  Service: General;  Laterality: Bilateral;  1. Excisional debridement skin to bone, skin to bone with biopsy and debridement of hard bone at the Left hip necrotic wound 11 cm long 5-1/2 cm wide 4-1/2 cm deep upon completion of debridement   2. Excisional debridement skin to muscle right hip with debridement    PRESSURE ULCER DEBRIDEMENT N/A 2/27/2024    Procedure: DEBRIDEMENT, PRESSURE ULCER;  Surgeon: Keyonna Jean MD;  Location: [facility] OR;  Service: General;  Laterality: N/A;       Family History    None       Tobacco Use    Smoking status: Never    Smokeless tobacco: Never   Substance and Sexual Activity    Alcohol use: Never    Drug use: Never    Sexual activity: Not Currently     Review of Systems   Constitutional:  Negative for chills, diaphoresis and fever.   Skin:  Positive for wound.   Neurological:  Positive for weakness (quadraplegia).       Objective:     Vital Signs (Most Recent):  Temp: 99.3 °F (37.4 °C) (07/07/25 1105)  Pulse: 69 (07/07/25 1105)  Resp: 18 (07/07/25 0815)  BP: 124/65 (07/07/25 1105)  SpO2: 99 % (07/07/25 1105) Vital Signs (24h Range):  Temp:  [98.4 °F (36.9 °C)-99.8 °F (37.7 °C)] 99.3 °F (37.4 °C)  Pulse:  [64-71] 69  Resp:  [18] 18  SpO2:  [94 %-99 %] 99 %  BP: (106-185)/(54-66) 124/65     Weight: 90.7 kg (199 lb 15.3 oz)  Body mass index is 30.4 kg/m².     Physical Exam  Vitals reviewed.   Constitutional:       General: He is awake. He is not in acute distress.     Appearance: He is overweight. He is ill-appearing (chronic). He is not toxic-appearing.      Comments: Chronically ill appearing white male, bilateral lower legs bent at the knee and contracted   HENT:      Head: Normocephalic and atraumatic.      Nose: Nose normal.      Mouth/Throat:      Pharynx: Oropharynx is clear.   Cardiovascular:      Rate and Rhythm: Normal rate and regular rhythm.      Pulses: Normal pulses.   Pulmonary:      Effort: Pulmonary effort is normal. No respiratory distress.    Abdominal:      Comments: Suprapubic catheter   Feet:      Comments: Bilateral feet/heels without skin breaks  Multiple areas of scarring to bilateral feet/ankles.   Skin:     General: Skin is warm and dry.      Capillary Refill: Capillary refill takes less than 2 seconds.      Findings: Wound present.             Comments: Sacral ulcer with undermining and large area of devitalized/necrotic tissue to distal rim of wound bed; some red healthy tissue to proximal part of wound bed. Cata-wound skin is denuded and friable with large area of skin necrosis      Right lowe buttock mostly pale pink tissue, distal rim with some undermining and area of tissue necrosis.     Left hip wound bed pink/red with serous drainage , undermining thought, tunneling at 2 o'clock.     Neurological:      General: No focal deficit present.      Mental Status: He is alert and oriented to person, place, and time. Mental status is at baseline.      Motor: Weakness (quadraplegia) present.   Psychiatric:         Mood and Affect: Mood normal.         Behavior: Behavior normal. Behavior is cooperative.       Sacrum/right lower buttock: 24 x 26 x 4 cm with undermining from 7 - 11 o'clock deepest 3.3 cm       Sacrum       Left hip: 4.2 x 2.5 x 1.3 cm  circumferential undermining with deep tunnel at 2 o'clock 7.5 cm       Left side of scrotum             Laboratory:  A1C:   Recent Labs   Lab 07/05/25  2041   HGBA1C 9.0*       BMP:   Recent Labs   Lab 07/07/25 0344   *   *   K 3.9      CO2 21*   BUN 39.8*   CREATININE 1.92*   CALCIUM 7.8*       CBC:   Recent Labs   Lab 07/07/25 0344   WBC 12.93*   RBC 2.93*   HGB 7.1*   HCT 23.1*      MCV 78.8*   MCH 24.2*   MCHC 30.7*     CMP:   Recent Labs   Lab 07/07/25 0344   *   CALCIUM 7.8*   ALBUMIN 1.6*   PROT 6.2*   *   K 3.9   CO2 21*      BUN 39.8*   CREATININE 1.92*   ALKPHOS 97   ALT 6   AST 6*   BILITOT 0.2       LFTs:   Recent Labs   Lab 07/07/25 0344    ALT 6   AST 6*   ALKPHOS 97   BILITOT 0.2   PROT 6.2*   ALBUMIN 1.6*     Microbiology Results (last 7 days)       Procedure Component Value Units Date/Time    Urine culture [[id number]]  (Abnormal) Collected: 07/05/25 1306    Order Status: Completed Specimen: Urine Updated: 07/07/25 1052     Urine Culture >/= 100,000 colonies/ml Gram-negative Rods      >/= 100,000 colonies/ml Gram-positive Cocci    Wound Culture [[id number]]  (Abnormal) Collected: 07/05/25 1325    Order Status: Completed Specimen: Decubitus from Sacral Updated: 07/07/25 0751     Wound Culture Many Gram-negative Rods      Many PRESUMPTIVE PROTEUS SPECIES      Many Streptococcus agalactiae (Group B)    Wound Culture [[id number]]  (Abnormal) Collected: 07/05/25 1306    Order Status: Completed Specimen: Decubitus from Hip, Left Updated: 07/07/25 0750     Wound Culture Many Gram-negative Rods      Many PRESUMPTIVE PROTEUS SPECIES      Many Streptococcus agalactiae (Group B)    Blood culture x two cultures. Draw prior to antibiotics. [[id number]]  (Normal) Collected: 07/05/25 1305    Order Status: Completed Specimen: Blood Updated: 07/06/25 1401     Blood Culture No Growth At 24 Hours    Blood culture x two cultures. Draw prior to antibiotics. [[id number]]  (Normal) Collected: 07/05/25 1325    Order Status: Completed Specimen: Blood from Hand, Right Updated: 07/06/25 1401     Blood Culture No Growth At 24 Hours              Recent Labs   Lab 07/05/25  1306   COLORU Light-Yellow   PHUR 6.0   PROTEINUA 1+*   BACTERIA Few*   NITRITE Negative   LEUKOCYTESUR 500*   UROBILINOGEN Normal       Diagnostic Results:  I have reviewed all pertinent imaging results/findings within the past 24 hours.    CT Pelvis Without Contrast  Status: Final result     Blockboard Results Release    Blockboard Status: Active  Results Release     PACS Images for SafetyCertified Viewer     Show images for CT Pelvis Without Contrast  CT Pelvis Without Contrast  Order: [id number]   Status:  Final result       Next appt: None    Test Result Released: Yes (not seen)    0 Result Notes  Details    Reading Physician Reading Date Result Priority   Florin Rausch MD  [phone]  7/5/2025 STAT     Narrative & Impression  EXAMINATION:  CT PELVIS WITHOUT CONTRAST     CLINICAL HISTORY:  extensive sacral decubitus ulcers, eval for abscesses/osteo;     TECHNIQUE:  CT imaging of the pelvis without IV contrast.  Axial, coronal and sagittal images reviewed.  Dose length product 544 mGycm. Automatic exposure control utilized to limit radiation dose.     COMPARISON:  Abdominal CT 08/16/2023     FINDINGS:  Inferior sacral measuring 7 cm in diameter and about 3 cm in depth.  Another ulcer extends towards the left greater trochanter and measures about 4 cm in depth with diameter of 8 cm.  Small volume fluid along the anterior portion of this ulcer tract.  Another area of ulceration extends toward the right greater trochanter and thin medially toward the right ischium.  It measures 12 cm in diameter.  No significant fluid identified in this region.  Inflammatory changes also extend inferiorly from the left inferior pubic ramus.  These are not completely included in the field of view, but no defined fluid collection here either.     Sclerosis of the inferior coccyx adjacent to the sacral ulcer.  There is additional sclerosis of the right ischium.  Heterotopic ossification at both hips.  Bladder decompressed with a suprapubic catheter.     Impression:     Several pelvic ulcers.  No defined fluid collection on noncontrast CT.     Areas of sclerosis in the coccyx and right ischium are likely related to osteitis, but likely subacute to chronic given sclerotic component.        Electronically signed by:Florin Rausch  Date:                                            07/05/2025  Time:                                           15:22
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
## 2025-07-08 ENCOUNTER — ANESTHESIA (OUTPATIENT)
Dept: SURGERY | Facility: HOSPITAL | Age: 60
End: 2025-07-08
Payer: MEDICARE

## 2025-07-08 ENCOUNTER — ANESTHESIA EVENT (OUTPATIENT)
Dept: SURGERY | Facility: HOSPITAL | Age: 60
End: 2025-07-08
Payer: MEDICARE

## 2025-07-08 LAB
ANION GAP SERPL CALC-SCNC: 8 MEQ/L
BACTERIA UR CULT: ABNORMAL
BACTERIA UR CULT: ABNORMAL
BACTERIA WND CULT: ABNORMAL
BASOPHILS # BLD AUTO: 0.07 X10(3)/MCL
BASOPHILS NFR BLD AUTO: 0.5 %
BUN SERPL-MCNC: 38.6 MG/DL (ref 8.4–25.7)
CALCIUM SERPL-MCNC: 8.2 MG/DL (ref 8.4–10.2)
CHLORIDE SERPL-SCNC: 102 MMOL/L (ref 98–107)
CO2 SERPL-SCNC: 23 MMOL/L (ref 22–29)
CREAT SERPL-MCNC: 1.58 MG/DL (ref 0.72–1.25)
CREAT/UREA NIT SERPL: 24
EOSINOPHIL # BLD AUTO: 0.35 X10(3)/MCL (ref 0–0.9)
EOSINOPHIL NFR BLD AUTO: 2.4 %
ERYTHROCYTE [DISTWIDTH] IN BLOOD BY AUTOMATED COUNT: 16.3 % (ref 11.5–17)
GFR SERPLBLD CREATININE-BSD FMLA CKD-EPI: 51 ML/MIN/1.73/M2
GLUCOSE SERPL-MCNC: 271 MG/DL (ref 74–100)
HCT VFR BLD AUTO: 25.3 % (ref 42–52)
HCT VFR BLD AUTO: 28.3 % (ref 42–52)
HEMATOLOGIST REVIEW: NORMAL
HGB BLD-MCNC: 8 G/DL (ref 14–18)
HGB BLD-MCNC: 8.9 G/DL (ref 14–18)
IMM GRANULOCYTES # BLD AUTO: 0.12 X10(3)/MCL (ref 0–0.04)
IMM GRANULOCYTES NFR BLD AUTO: 0.8 %
LYMPHOCYTES # BLD AUTO: 2.51 X10(3)/MCL (ref 0.6–4.6)
LYMPHOCYTES NFR BLD AUTO: 17.2 %
MCH RBC QN AUTO: 25.4 PG (ref 27–31)
MCHC RBC AUTO-ENTMCNC: 31.4 G/DL (ref 33–36)
MCV RBC AUTO: 80.6 FL (ref 80–94)
MONOCYTES # BLD AUTO: 0.86 X10(3)/MCL (ref 0.1–1.3)
MONOCYTES NFR BLD AUTO: 5.9 %
NEUTROPHILS # BLD AUTO: 10.65 X10(3)/MCL (ref 2.1–9.2)
NEUTROPHILS NFR BLD AUTO: 73.2 %
NRBC BLD AUTO-RTO: 0 %
PLATELET # BLD AUTO: 337 X10(3)/MCL (ref 130–400)
PMV BLD AUTO: 10.9 FL (ref 7.4–10.4)
POCT GLUCOSE: 227 MG/DL (ref 70–110)
POCT GLUCOSE: 244 MG/DL (ref 70–110)
POCT GLUCOSE: 247 MG/DL (ref 70–110)
POCT GLUCOSE: 257 MG/DL (ref 70–110)
POCT GLUCOSE: 276 MG/DL (ref 70–110)
POTASSIUM SERPL-SCNC: 3.8 MMOL/L (ref 3.5–5.1)
RBC # BLD AUTO: 3.51 X10(6)/MCL (ref 4.7–6.1)
SODIUM SERPL-SCNC: 133 MMOL/L (ref 136–145)
TOBRAMYCIN PEAK SERPL-MCNC: 0.7 UG/ML (ref 4–8)
WBC # BLD AUTO: 14.56 X10(3)/MCL (ref 4.5–11.5)

## 2025-07-08 PROCEDURE — 36415 COLL VENOUS BLD VENIPUNCTURE: CPT

## 2025-07-08 PROCEDURE — 63600175 PHARM REV CODE 636 W HCPCS: Performed by: INTERNAL MEDICINE

## 2025-07-08 PROCEDURE — 36000706: Performed by: SURGERY

## 2025-07-08 PROCEDURE — 37000009 HC ANESTHESIA EA ADD 15 MINS: Performed by: SURGERY

## 2025-07-08 PROCEDURE — 36415 COLL VENOUS BLD VENIPUNCTURE: CPT | Performed by: INTERNAL MEDICINE

## 2025-07-08 PROCEDURE — 25000003 PHARM REV CODE 250: Performed by: INTERNAL MEDICINE

## 2025-07-08 PROCEDURE — 25000003 PHARM REV CODE 250: Performed by: NURSE ANESTHETIST, CERTIFIED REGISTERED

## 2025-07-08 PROCEDURE — 27000207 HC ISOLATION

## 2025-07-08 PROCEDURE — 0JB70ZZ EXCISION OF BACK SUBCUTANEOUS TISSUE AND FASCIA, OPEN APPROACH: ICD-10-PCS | Performed by: SURGERY

## 2025-07-08 PROCEDURE — 21400001 HC TELEMETRY ROOM

## 2025-07-08 PROCEDURE — 85014 HEMATOCRIT: CPT

## 2025-07-08 PROCEDURE — 36569 INSJ PICC 5 YR+ W/O IMAGING: CPT

## 2025-07-08 PROCEDURE — 36000707: Performed by: SURGERY

## 2025-07-08 PROCEDURE — 63600175 PHARM REV CODE 636 W HCPCS: Performed by: NURSE ANESTHETIST, CERTIFIED REGISTERED

## 2025-07-08 PROCEDURE — 37000008 HC ANESTHESIA 1ST 15 MINUTES: Performed by: SURGERY

## 2025-07-08 PROCEDURE — 85025 COMPLETE CBC W/AUTO DIFF WBC: CPT | Performed by: INTERNAL MEDICINE

## 2025-07-08 PROCEDURE — 80200 ASSAY OF TOBRAMYCIN: CPT | Performed by: INTERNAL MEDICINE

## 2025-07-08 PROCEDURE — 02HV33Z INSERTION OF INFUSION DEVICE INTO SUPERIOR VENA CAVA, PERCUTANEOUS APPROACH: ICD-10-PCS | Performed by: INTERNAL MEDICINE

## 2025-07-08 PROCEDURE — 99233 SBSQ HOSP IP/OBS HIGH 50: CPT | Mod: 25,,, | Performed by: SURGERY

## 2025-07-08 PROCEDURE — 80048 BASIC METABOLIC PNL TOTAL CA: CPT | Performed by: INTERNAL MEDICINE

## 2025-07-08 PROCEDURE — 71000033 HC RECOVERY, INTIAL HOUR: Performed by: SURGERY

## 2025-07-08 PROCEDURE — C1751 CATH, INF, PER/CENT/MIDLINE: HCPCS

## 2025-07-08 RX ORDER — ROCURONIUM BROMIDE 10 MG/ML
INJECTION, SOLUTION INTRAVENOUS
Status: DISCONTINUED | OUTPATIENT
Start: 2025-07-08 | End: 2025-07-08

## 2025-07-08 RX ORDER — PROPOFOL 10 MG/ML
VIAL (ML) INTRAVENOUS
Status: DISCONTINUED | OUTPATIENT
Start: 2025-07-08 | End: 2025-07-08

## 2025-07-08 RX ORDER — ONDANSETRON HYDROCHLORIDE 2 MG/ML
4 INJECTION, SOLUTION INTRAVENOUS DAILY PRN
Status: DISCONTINUED | OUTPATIENT
Start: 2025-07-08 | End: 2025-07-08 | Stop reason: HOSPADM

## 2025-07-08 RX ORDER — SODIUM CHLORIDE 0.9 % (FLUSH) 0.9 %
10 SYRINGE (ML) INJECTION EVERY 12 HOURS PRN
Status: DISCONTINUED | OUTPATIENT
Start: 2025-07-08 | End: 2025-07-09 | Stop reason: HOSPADM

## 2025-07-08 RX ORDER — TRAMADOL HYDROCHLORIDE 50 MG/1
50 TABLET, FILM COATED ORAL EVERY 6 HOURS PRN
Status: DISCONTINUED | OUTPATIENT
Start: 2025-07-08 | End: 2025-07-09 | Stop reason: HOSPADM

## 2025-07-08 RX ORDER — PHENYLEPHRINE HYDROCHLORIDE 10 MG/ML
INJECTION INTRAVENOUS
Status: DISCONTINUED | OUTPATIENT
Start: 2025-07-08 | End: 2025-07-08

## 2025-07-08 RX ORDER — LIDOCAINE HYDROCHLORIDE 10 MG/ML
1 INJECTION, SOLUTION EPIDURAL; INFILTRATION; INTRACAUDAL; PERINEURAL ONCE
Status: CANCELLED | OUTPATIENT
Start: 2025-07-08 | End: 2025-07-08

## 2025-07-08 RX ORDER — ONDANSETRON 4 MG/1
4 TABLET, ORALLY DISINTEGRATING ORAL ONCE
Status: CANCELLED | OUTPATIENT
Start: 2025-07-08 | End: 2025-07-08

## 2025-07-08 RX ORDER — MIDAZOLAM HYDROCHLORIDE 1 MG/ML
INJECTION INTRAMUSCULAR; INTRAVENOUS
Status: DISCONTINUED | OUTPATIENT
Start: 2025-07-08 | End: 2025-07-08

## 2025-07-08 RX ORDER — SODIUM CHLORIDE, SODIUM GLUCONATE, SODIUM ACETATE, POTASSIUM CHLORIDE AND MAGNESIUM CHLORIDE 30; 37; 368; 526; 502 MG/100ML; MG/100ML; MG/100ML; MG/100ML; MG/100ML
INJECTION, SOLUTION INTRAVENOUS CONTINUOUS
Status: CANCELLED | OUTPATIENT
Start: 2025-07-08 | End: 2025-08-07

## 2025-07-08 RX ORDER — EPHEDRINE SULFATE 50 MG/ML
INJECTION, SOLUTION INTRAVENOUS
Status: DISCONTINUED | OUTPATIENT
Start: 2025-07-08 | End: 2025-07-08

## 2025-07-08 RX ORDER — ONDANSETRON HYDROCHLORIDE 2 MG/ML
INJECTION, SOLUTION INTRAVENOUS
Status: DISCONTINUED | OUTPATIENT
Start: 2025-07-08 | End: 2025-07-08

## 2025-07-08 RX ORDER — METOCLOPRAMIDE HYDROCHLORIDE 5 MG/ML
10 INJECTION INTRAMUSCULAR; INTRAVENOUS EVERY 10 MIN PRN
Status: DISCONTINUED | OUTPATIENT
Start: 2025-07-08 | End: 2025-07-08 | Stop reason: HOSPADM

## 2025-07-08 RX ORDER — LIDOCAINE HYDROCHLORIDE 20 MG/ML
INJECTION INTRAVENOUS
Status: DISCONTINUED | OUTPATIENT
Start: 2025-07-08 | End: 2025-07-08

## 2025-07-08 RX ORDER — FENTANYL CITRATE 50 UG/ML
INJECTION, SOLUTION INTRAMUSCULAR; INTRAVENOUS
Status: DISCONTINUED | OUTPATIENT
Start: 2025-07-08 | End: 2025-07-08

## 2025-07-08 RX ORDER — HYDROMORPHONE HYDROCHLORIDE 2 MG/ML
0.2 INJECTION, SOLUTION INTRAMUSCULAR; INTRAVENOUS; SUBCUTANEOUS EVERY 5 MIN PRN
Status: DISCONTINUED | OUTPATIENT
Start: 2025-07-08 | End: 2025-07-08 | Stop reason: HOSPADM

## 2025-07-08 RX ORDER — DIPHENHYDRAMINE HYDROCHLORIDE 50 MG/ML
25 INJECTION, SOLUTION INTRAMUSCULAR; INTRAVENOUS EVERY 6 HOURS PRN
Status: DISCONTINUED | OUTPATIENT
Start: 2025-07-08 | End: 2025-07-08 | Stop reason: HOSPADM

## 2025-07-08 RX ORDER — INSULIN GLARGINE 100 [IU]/ML
25 INJECTION, SOLUTION SUBCUTANEOUS NIGHTLY
Status: DISCONTINUED | OUTPATIENT
Start: 2025-07-08 | End: 2025-07-09 | Stop reason: HOSPADM

## 2025-07-08 RX ORDER — MIDAZOLAM HYDROCHLORIDE 2 MG/2ML
2 INJECTION, SOLUTION INTRAMUSCULAR; INTRAVENOUS ONCE AS NEEDED
Status: CANCELLED | OUTPATIENT
Start: 2025-07-08 | End: 2036-12-03

## 2025-07-08 RX ORDER — GLUCAGON 1 MG
1 KIT INJECTION
Status: DISCONTINUED | OUTPATIENT
Start: 2025-07-08 | End: 2025-07-08 | Stop reason: HOSPADM

## 2025-07-08 RX ADMIN — PHENYLEPHRINE HYDROCHLORIDE 100 MCG: 10 INJECTION INTRAVENOUS at 07:07

## 2025-07-08 RX ADMIN — PHENYLEPHRINE HYDROCHLORIDE 100 MCG: 10 INJECTION INTRAVENOUS at 08:07

## 2025-07-08 RX ADMIN — MUPIROCIN: 20 OINTMENT TOPICAL at 09:07

## 2025-07-08 RX ADMIN — MIDAZOLAM HYDROCHLORIDE 2 MG: 1 INJECTION, SOLUTION INTRAMUSCULAR; INTRAVENOUS at 07:07

## 2025-07-08 RX ADMIN — INSULIN ASPART 4 UNITS: 100 INJECTION, SOLUTION INTRAVENOUS; SUBCUTANEOUS at 11:07

## 2025-07-08 RX ADMIN — INSULIN GLARGINE 25 UNITS: 100 INJECTION, SOLUTION SUBCUTANEOUS at 09:07

## 2025-07-08 RX ADMIN — FENTANYL CITRATE 100 MCG: 50 INJECTION, SOLUTION INTRAMUSCULAR; INTRAVENOUS at 07:07

## 2025-07-08 RX ADMIN — DAPTOMYCIN 600 MG: 500 INJECTION, POWDER, LYOPHILIZED, FOR SOLUTION INTRAVENOUS at 03:07

## 2025-07-08 RX ADMIN — PROPOFOL 150 MG: 10 INJECTION, EMULSION INTRAVENOUS at 07:07

## 2025-07-08 RX ADMIN — ROCURONIUM BROMIDE 40 MG: 10 SOLUTION INTRAVENOUS at 07:07

## 2025-07-08 RX ADMIN — OXYBUTYNIN CHLORIDE 10 MG: 5 TABLET, EXTENDED RELEASE ORAL at 11:07

## 2025-07-08 RX ADMIN — ENOXAPARIN SODIUM 30 MG: 30 INJECTION SUBCUTANEOUS at 05:07

## 2025-07-08 RX ADMIN — EPHEDRINE SULFATE 10 MG: 50 INJECTION INTRAVENOUS at 08:07

## 2025-07-08 RX ADMIN — INSULIN ASPART 6 UNITS: 100 INJECTION, SOLUTION INTRAVENOUS; SUBCUTANEOUS at 05:07

## 2025-07-08 RX ADMIN — TRAMADOL HYDROCHLORIDE 50 MG: 50 TABLET, COATED ORAL at 09:07

## 2025-07-08 RX ADMIN — DILTIAZEM HYDROCHLORIDE 120 MG: 120 CAPSULE, COATED, EXTENDED RELEASE ORAL at 11:07

## 2025-07-08 RX ADMIN — SUGAMMADEX 200 MG: 100 INJECTION, SOLUTION INTRAVENOUS at 09:07

## 2025-07-08 RX ADMIN — PHENYLEPHRINE HYDROCHLORIDE 100 MCG: 10 INJECTION INTRAVENOUS at 09:07

## 2025-07-08 RX ADMIN — ONDANSETRON 4 MG: 2 INJECTION INTRAMUSCULAR; INTRAVENOUS at 08:07

## 2025-07-08 RX ADMIN — SODIUM CHLORIDE, SODIUM GLUCONATE, SODIUM ACETATE, POTASSIUM CHLORIDE AND MAGNESIUM CHLORIDE: 526; 502; 368; 37; 30 INJECTION, SOLUTION INTRAVENOUS at 07:07

## 2025-07-08 RX ADMIN — MUPIROCIN: 20 OINTMENT TOPICAL at 11:07

## 2025-07-08 RX ADMIN — LIDOCAINE HYDROCHLORIDE 80 MG: 20 INJECTION INTRAVENOUS at 07:07

## 2025-07-08 RX ADMIN — MEROPENEM 1 G: 1 INJECTION, POWDER, FOR SOLUTION INTRAVENOUS at 02:07

## 2025-07-08 RX ADMIN — EPHEDRINE SULFATE 10 MG: 50 INJECTION INTRAVENOUS at 07:07

## 2025-07-08 RX ADMIN — INSULIN ASPART 6 UNITS: 100 INJECTION, SOLUTION INTRAVENOUS; SUBCUTANEOUS at 06:07

## 2025-07-08 NOTE — TRANSFER OF CARE
"Anesthesia Transfer of Care Note    Patient: Antonio Galvan II    Procedure(s) Performed: Procedure(s) (LRB):  DEBRIDEMENT, PRESSURE ULCER (Bilateral)    Patient location: PACU    Anesthesia Type: general    Transport from OR: Transported from OR on room air with adequate spontaneous ventilation    Post pain: adequate analgesia    Post assessment: no apparent anesthetic complications    Post vital signs: stable    Level of consciousness: awake    Nausea/Vomiting: no nausea/vomiting    Complications: none    Transfer of care protocol was followed      Last vitals: Visit Vitals  BP 95/65   Pulse 73   Temp 36 °C (96.8 °F)   Resp 13   Ht 5' 8" (1.727 m)   Wt 90.7 kg (199 lb 15.3 oz)   SpO2 100%   BMI 30.40 kg/m²     "

## 2025-07-08 NOTE — ANESTHESIA POSTPROCEDURE EVALUATION
Anesthesia Post Evaluation    Patient: Antonio Malloytis II    Procedure(s) Performed: Procedure(s) (LRB):  DEBRIDEMENT, PRESSURE ULCER (Bilateral)    Final Anesthesia Type: general      Patient location during evaluation: PACU  Patient participation: Yes- Able to Participate  Level of consciousness: awake and alert and oriented  Post-procedure vital signs: reviewed and stable  Pain management: adequate  Airway patency: patent  LIZBETH mitigation strategies: Verification of full reversal of neuromuscular block  PONV status at discharge: No PONV  Anesthetic complications: no      Cardiovascular status: blood pressure returned to baseline and stable  Respiratory status: spontaneous ventilation and unassisted  Hydration status: euvolemic  Follow-up not needed.  Comments: [facility]              Vitals Value Taken Time   /77 07/08/25 16:15   Temp 37.3 °C (99.1 °F) 07/08/25 16:15   Pulse 81 07/08/25 16:15   Resp 24 07/08/25 16:15   SpO2 98 % 07/08/25 16:15         Event Time   Out of Recovery 07/08/2025 09:42:29         Pain/Allsion Score: Pain Rating Prior to Med Admin: 0 (7/8/2025  2:01 PM)  Allison Score: 8 (7/8/2025  9:38 AM)

## 2025-07-08 NOTE — OP NOTE
[facility] - Periop Services  General Surgery  Operative Note    SUMMARY     Date of Procedure: 7/8/2025     Procedure: Procedure(s) (LRB):  DEBRIDEMENT, PRESSURE ULCER (Bilateral)       Surgeons and Role:     * Arnie Dixon Jr., MD - Primary     * Joseph Escoto MD - Resident - Assisting     * Bravo Alvarez MD - Resident - Assisting    Pre-Operative Diagnosis: Pressure ulcer of left hip, stage 4 [L89.224]  Pressure ulcer of sacral region, stage 4 [L89.154]  Pressure injury of right ischium, stage 4 [L89.314]  Open wound of left hip, initial encounter [S71.002A]    Post-Operative Diagnosis: Post-Op Diagnosis Codes:     * Pressure ulcer of left hip, stage 4 [L89.224]     * Pressure ulcer of sacral region, stage 4 [L89.154]     * Pressure injury of right ischium, stage 4 [L89.314]     * Open wound of left hip, initial encounter [S71.002A]    Anesthesia: Choice    Operative Findings (including complications, if any): necrotic tissue and fibrinous exudate noted in the base and edges of sacral wound. An approximately 6cm x 4cm eschar noted inferior to sacral wound was sharply debrided. Sacral wound debrided to near bone -  approximately 10cm x 6.5cm x 3 cm after debridement; healing right ischial/buttocks wound, healing left hip wound.    Description of Technical Procedures:   Patient was brought to the operating room and placed in the left lateral position. After adequate anesthesia was achieved, the sacral wound and right ischium wound were prepped with betadine and draped in sterile fashion.     A thorough inspection of the wound was performed. Devitalized tissue, fibrinous slough, and necrotic debris were sharply debrided using scalpel and Bovie cautery down to the healthy bleeding tissue. The wound edges were sharply excised to remove nonviable margins. Care was taken to preserve viable skin and subcutaneous tissue. An approximately 6cm x 4cm eschar noted inferior to sacral wound was also  sharply debrided. Hemostasis was achieved using electrocautery. The sacral wound was approximately 10cm x 6.5 cm x 3cm after debridement.     The wound bed was irrigated copiously with normal saline, and remain debris was removed. A wet to dry dressing was applied with saline soaked Kerlix to the sacral wound and to the right ischial wound.     Significant Surgical Tasks Conducted by the Assistant(s), if Applicable: Retraction for adequate visualization    Estimated Blood Loss (EBL): 300 mL           Implants: * No implants in log *    Specimens:   Specimen (24h ago, onward)      None           Condition: Good    Disposition: PACU - hemodynamically stable.    Attestation: Dr. Dixon, the attending surgeon, was present and scrubbed for the entire procedure.

## 2025-07-08 NOTE — PLAN OF CARE
07/08/25 1130   Discharge Reassessment   Assessment Type Discharge Planning Reassessment   Discharge Plan discussed with: Patient   Discharge Plan A Skilled Nursing Facility   Discharge Plan B Long-term acute care facility (LTAC)   Post-Acute Status   Post-Acute Authorization Placement   Post-Acute Placement Status Referrals Sent  ([facility])

## 2025-07-08 NOTE — PROGRESS NOTES
Acute Care Surgery   Progress Note  Admit Date: 7/5/2025  HD#3  POD#* Day of Surgery *    Subjective:   Interval history:  ANDREW MACIEL  VSS, hypertensive  Reporting no BM since last Friday, reports enema use at home which he will likely require post operatively   Questions answered regarding surgery; OR today for debridement    Home Meds:  Current Outpatient Medications   Medication Instructions    desonide 0.05% (DESOWEN) 0.05 % Oint 1 application , Topical (Top), 2 times daily    diltiaZEM (CARDIZEM CD) 120 mg, Oral, Daily    ferrous sulfate 325 mg, Oral, Daily    insulin detemir U-100 (LEVEMIR) 15 Units, Subcutaneous, Nightly    JANUVIA 100 mg, Daily    ketoconazole (NIZORAL) 2 % cream     miconazole (MICOTIN) 2 % cream Topical (Top), 2 times daily    mirabegron (MYRBETRIQ) 25 mg, Oral, Daily    oxybutynin (DITROPAN-XL) 10 mg, Oral, Daily      Scheduled Meds:   DAPTOmycin (CUBICIN) IV (PEDS and ADULTS)  600 mg Intravenous Q48H    diltiaZEM  120 mg Oral Daily    enoxparin  30 mg Subcutaneous Daily    insulin glargine U-100  20 Units Subcutaneous QHS    meropenem IV (PEDS and ADULTS)  1 g Intravenous Q12H    mupirocin   Nasal BID    oxybutynin  10 mg Oral Daily     Continuous Infusions:  PRN Meds:  Current Facility-Administered Medications:     acetaminophen, 650 mg, Oral, Q8H PRN    dextrose 50%, 12.5 g, Intravenous, PRN    dextrose 50%, 25 g, Intravenous, PRN    glucagon (human recombinant), 1 mg, Intramuscular, PRN    glucose, 16 g, Oral, PRN    glucose, 24 g, Oral, PRN    insulin aspart U-100, 0-10 Units, Subcutaneous, QID (AC + HS) PRN    melatonin, 6 mg, Oral, Nightly PRN    sodium chloride 0.9%, 10 mL, Intravenous, PRN    Flushing PICC/Midline Protocol, , , Until Discontinued **AND** sodium chloride 0.9%, 10 mL, Intravenous, Q12H PRN    Flushing PICC/Midline Protocol, , , Until Discontinued **AND** sodium chloride 0.9%, 10 mL, Intravenous, Q12H PRN     Objective:     VITAL SIGNS: 24 HR MIN & MAX LAST   Temp  " Min: 98.6 °F (37 °C)  Max: 99.8 °F (37.7 °C)  99.5 °F (37.5 °C)   BP  Min: 118/64  Max: 182/83  (!) 151/77    Pulse  Min: 67  Max: 79  73    Resp  Min: 18  Max: 20  18    SpO2  Min: 97 %  Max: 100 %  97 %      HT: 5' 8" (172.7 cm)  WT: 90.7 kg (199 lb 15.3 oz)  BMI: 30.4     Intake/output:  Intake/Output - Last 3 Shifts         07/06 0700 07/07 0659 07/07 0700 07/08 0659    I.V. (mL/kg) 851.4 (9.4)     IV Piggyback 549.2     Total Intake(mL/kg) 1400.7 (15.4)     Urine (mL/kg/hr) 1400 (0.6) 1100 (0.5)    Total Output 1400 1100    Net +0.7 -1100                  Intake/Output Summary (Last 24 hours) at 7/8/2025 0637  Last data filed at 7/7/2025 2107  Gross per 24 hour   Intake --   Output 1100 ml   Net -1100 ml           Lines/drains/airway:  PICC Double Lumen 07/08/25 0143 left basilic (Active)   $ PICC Line Charges (Upon insertion) Pt 5+ Years Old - Bedside Insertion Performed (no subq port/pump or image guidance);Catheter - Double Lumen (Supply) 07/08/25 0142   Line Necessity Review Longterm central access required 07/08/25 0142   Verification by X-ray Yes 07/08/25 0142   Site Assessment No redness;No drainage;No swelling;No warmth 07/08/25 0142   Extremity Assessment Distal to IV No abnormal discoloration;No redness;No warmth;No swelling 07/08/25 0142   Line Securement Device Secured with sutureless device 07/08/25 0142   Dressing Type CHG impregnated dressing/sponge 07/08/25 0142   Dressing Status Clean;Dry;Intact 07/08/25 0142   Dressing Intervention First dressing 07/08/25 0142   Date on Dressing 07/08/25 07/08/25 0142   Distal Patency/Care blood return present;flushed w/o difficulty;normal saline locked 07/08/25 0142   Proximal 1 Patency/Care blood return present;flushed w/o difficulty;normal saline locked 07/08/25 0142   Current Insertion Depth (cm) 42 cm 07/08/25 0142   Current Exposed Catheter (cm) 0 cm 07/08/25 0142   Extremity Circumference (cm) 30 cm 07/08/25 0142   Number of days: 0            " "Suprapubic Catheter 09/23/23 1000 latex 20 Fr. (Active)   Number of days: 653       Physical Exam:  General: Well developed, well nourished, no acute distress  HEENT: Normocephalic, PERRL  CV: RR  Resp: NWOB  GI:  Abdomen soft, non-tender, non-distended, no guarding, no rebound  :  Deferred  MSK: No muscle atrophy, cyanosis, peripheral edema, moving all extremities spontaneously  Skin/wounds:  stage 4 pressure ulcers to L hip, R ischium, buttocks and sacrum; necrotic appearing tissue  Neuro:  alert and oriented to person, place, and time, quadriplegic    Labs:  Renal:  Recent Labs     07/05/25  1306 07/06/25 0421 07/07/25 0344   BUN 53.6* 49.4* 39.8*   CREATININE 2.78* 2.21* 1.92*     No results for input(s): "LACTIC" in the last 72 hours.  FENGI:  Recent Labs     07/05/25  1306 07/06/25 0421 07/07/25 0344   * 132* 135*   K 4.3 4.4 3.9    103 105   CO2 19* 21* 21*   CALCIUM 8.7 7.7* 7.8*   PROT 8.0 6.1* 6.2*   ALBUMIN 2.2* 1.6* 1.6*   BILITOT 0.2 0.2 0.2   AST 9* 5* 6*   ALKPHOS 105 89 97   ALT 8 5 6     Heme:  Recent Labs     07/05/25  1306 07/06/25 0421 07/07/25 0344   HGB 10.3* 6.3* 7.1*   HCT 33.7* 20.1* 23.1*   * 327 343     ID:  Recent Labs     07/05/25  1306 07/06/25  0421 07/07/25 0344   WBC 18.05* 13.41* 12.93*     CBG:  No results for input(s): "GLUCOSE" in the last 72 hours.   Cardiovascular:  No results for input(s): "TROPONINI", "CKTOTAL", "CKMB", "BNP" in the last 168 hours.  ABG:  No results for input(s): "PH", "PO2", "PCO2", "HCO3", "BE" in the last 168 hours.   I have reviewed all pertinent lab results within the past 24 hours.    Imaging:  X-Ray Chest 1 View for Line/Tube Placement         CT Pelvis Without Contrast   Final Result      Several pelvic ulcers.  No defined fluid collection on noncontrast CT.      Areas of sclerosis in the coccyx and right ischium are likely related to osteitis, but likely subacute to chronic given sclerotic component.         Electronically " signed by: Florin Rausch   Date:    07/05/2025   Time:    15:22      X-Ray Chest AP Portable   Final Result      No acute abnormality of the chest.         Electronically signed by: Lilia Geiger   Date:    07/05/2025   Time:    13:10      Anesthesia US Guide Vascular Access    (Results Pending)      I have reviewed all pertinent imaging results/findings within the past 24 hours.    Micro/Path/Other:  Microbiology Results (last 7 days)       Procedure Component Value Units Date/Time    Blood culture x two cultures. Draw prior to antibiotics. [[id number]]  (Normal) Collected: 07/05/25 1305    Order Status: Completed Specimen: Blood Updated: 07/07/25 1401     Blood Culture No Growth At 48 Hours    Blood culture x two cultures. Draw prior to antibiotics. [[id number]]  (Normal) Collected: 07/05/25 1325    Order Status: Completed Specimen: Blood from Hand, Right Updated: 07/07/25 1401     Blood Culture No Growth At 48 Hours    Urine culture [[id number]]  (Abnormal) Collected: 07/05/25 1306    Order Status: Completed Specimen: Urine Updated: 07/07/25 1052     Urine Culture >/= 100,000 colonies/ml Gram-negative Rods      >/= 100,000 colonies/ml Gram-positive Cocci    Wound Culture [[id number]]  (Abnormal) Collected: 07/05/25 1325    Order Status: Completed Specimen: Decubitus from Sacral Updated: 07/07/25 0751     Wound Culture Many Gram-negative Rods      Many PRESUMPTIVE PROTEUS SPECIES      Many Streptococcus agalactiae (Group B)    Wound Culture [[id number]]  (Abnormal) Collected: 07/05/25 1306    Order Status: Completed Specimen: Decubitus from Hip, Left Updated: 07/07/25 0750     Wound Culture Many Gram-negative Rods      Many PRESUMPTIVE PROTEUS SPECIES      Many Streptococcus agalactiae (Group B)           Pathology Results  (Last 7 days)      None             Assessment & Plan:   59-year-old male with chronic sacral, buttocks and left hip ulcers which were acutely worsened after a trip to [place] with  prolonged time in a vehicle approximately 5 days.  Multiple sacral wounds that appeared necrotic and infected requiring IV meropenem and daptomycin.  Hyperbarics is following.       Plan for OR today for sacral, buttocks, bilateral ischial wound debridement   NPO  Recommend aggressive bowel regimen post op with enemas    Joseph Escoto M.D.   [facility] General Surgery - PGY2

## 2025-07-08 NOTE — BRIEF OP NOTE
[facility] - Periop Services  Brief Operative Note    SUMMARY     Surgery Date: 7/8/2025     Surgeons and Role:     * Arnie Dixon Jr., MD - Primary     * Joseph Escoto MD - Resident - Assisting     * Bravo Alvarez MD - Resident - Assisting    Pre-op Diagnosis:  Pressure ulcer of left hip, stage 4 [L89.224]  Pressure ulcer of sacral region, stage 4 [L89.154]  Pressure injury of right ischium, stage 4 [L89.314]  Open wound of left hip, initial encounter [S71.002A]    Post-op Diagnosis:  Post-Op Diagnosis Codes:     * Pressure ulcer of left hip, stage 4 [L89.224]     * Pressure ulcer of sacral region, stage 4 [L89.154]     * Pressure injury of right ischium, stage 4 [L89.314]     * Open wound of left hip, initial encounter [S71.002A]    Procedure(s) (LRB):  DEBRIDEMENT, PRESSURE ULCER (Bilateral)    Anesthesia: Choice    Implants:  * No implants in log *    Operative Findings: necrotic tissue and fibrinous exudate noted in the base and edges of sacral wound. An approximately 6cm x 4cm eschar noted inferior to sacral wound was sharply debrided. Sacral wound debrided to near bone -  approximately 10cm x 6.5cm x 3 cm after debridement; healing right ischial/buttocks wound, healing left hip wound.     Estimated Blood Loss: 300 mL    Estimated Blood Loss has been documented.         Specimens:   Specimen (24h ago, onward)      None          * No specimens in log *    [id number]    Post operative plan:   -continued wound care per hyperbaric medicine and wound care nursing  -right ischium and sacral wound packed with saline soaked kerlix  -please re-consult/call back surgical team if further debridement occurs or post operative complications such as bleeding arise  -check H/H at 1400    Joseph Escoto M.D.   [facility] General Surgery - PGY2

## 2025-07-08 NOTE — ANESTHESIA PROCEDURE NOTES
Intubation    Date/Time: 7/8/2025 7:23 AM    Performed by: Dabadie, Virginia G, CRNA  Authorized by: Dewayne Bond MD    Intubation:     Induction:  Intravenous    Intubated:  Postinduction    Mask Ventilation:  Easy mask    Attempts:  1    Attempted By:  CRNA    Method of Intubation:  Video laryngoscopy    Blade:  Guan 4    Laryngeal View Grade: Grade I - full view of cords      Difficult Airway Encountered?: No      Complications:  None    Airway Device:  Oral endotracheal tube    Airway Device Size:  7.0    Style/Cuff Inflation:  Cuffed (inflated to minimal occlusive pressure)    Tube secured:  21    Secured at:  The lips    Placement Verified By:  Capnometry    Complicating Factors:  Short neck and poor neck/head extension    Findings Post-Intubation:  BS equal bilateral and atraumatic/condition of teeth unchanged  Notes:      Cuff pressure 25 cmH2O

## 2025-07-08 NOTE — ANESTHESIA PREPROCEDURE EVALUATION
07/08/2025  Antonio Galvan II is a 59 y.o., male.  Diagnosis:      Pressure ulcer of left hip, stage 4 [L89.224]      Pressure ulcer of sacral region, stage 4 [L89.154]      Pressure injury of right ischium, stage 4 [L89.314]      Open wound of left hip, initial encounter [S71.002A]   Pre-op diagnosis:      Pressure ulcer of left hip, stage 4 [L89.224]      Pressure ulcer of sacral region, stage 4 [L89.154]      Pressure injury of right ischium, stage 4 [L89.314]      Open wound of left hip, initial encounter [S71.002A]   Location: [facility] OR 09 / [facility] OR   Surgeons: Arnie Dixon Jr., MD         The pt. comes to [facility] for the noted procedure under  IV Sedation/MAC vs GETA.  Case: [id number] Date/Time: 07/08/25 0715   Procedure: DEBRIDEMENT, PRESSURE ULCER (Bilateral) - sacral, buttocks, bilateral ischial pressure ulcers   Anesthesia type: Choice     PMHx:  No specialty history recorded    Other Medical History   Chronic skin ulcer DM (diabetes mellitus)   Infected decubitus ulcer Lymphedema of leg   Neurogenic bladder Obesity, unspecified   Pressure ulcer of heel Pressure ulcer of right ischium   Quadriplegia Quadriplegic spinal paralysis   Pressure ulcer of right foot, stage 3 A-fib   Cardiac arrest      Problem List  Current as of 07/08/25 0559  Abnormal urinalysis Acute deep vein thrombosis (DVT) of brachial vein of right upper extremity   Atrial flutter Chronic blood loss anemia   Complex sleep apnea syndrome Constipation   Decubitus ulcer, infected Intolerance of continuous positive airway pressure (CPAP) ventilation   Obstructive sleep apnea syndrome Open wound of left hip   Osteomyelitis Pressure injury of right ischium, stage 4   Pressure ulcer of left hip, stage 4 Pressure ulcer of sacral region, stage 4   Quadriplegia Uncontrolled type 2 diabetes mellitus with hyperglycemia  "        PSHx:  Surgical History:  DEBRIDEMENT OF BUTTOCKS APPLICATION OF WOUND VACUUM-ASSISTED CLOSURE DEVICE   INCISION AND DRAINAGE HIP PRESSURE ULCER DEBRIDEMENT         Vital signs:  Pre Vitals  Current as of 07/08/25 0559  BP: Pulse:   Resp: 20 SpO2:   Temp:   Height: 5' 8" (1.727 m) (07/07/25) Weight: 90.7 kg (199 lb 15.3 oz) (07/06/25)   BMI: 30.40 IBW: 68.4 kg (150 lb 12.1 oz)   Last edited 07/07/25 2344 by NN      Lab Data:   Latest Reference Range & Units Most Recent 07/06/25 04:21 07/07/25 03:44   WBC 4.50 - 11.50 x10(3)/mcL 12.93 (H)  7/7/25 03:44 13.41 (H) 12.93 (H)   RBC 4.70 - 6.10 x10(6)/mcL 2.93 (L)  7/7/25 03:44 2.54 (L) 2.93 (L)   Hemoglobin 14.0 - 18.0 g/dL 7.1 (L)  7/7/25 03:44 6.3 (L) 7.1 (L)   Hematocrit 42.0 - 52.0 % 23.1 (L)  7/7/25 03:44 20.1 (L) 23.1 (L)   MCV 80.0 - 94.0 fL 78.8 (L)  7/7/25 03:44 79.1 (L) 78.8 (L)   MCH 27.0 - 31.0 pg 24.2 (L)  7/7/25 03:44 24.8 (L) 24.2 (L)   MCHC 33.0 - 36.0 g/dL 30.7 (L)  7/7/25 03:44 31.3 (L) 30.7 (L)   RDW 11.5 - 17.0 % 16.5  7/7/25 03:44 16.4 16.5   Platelet Count 130 - 400 x10(3)/mcL 343  7/7/25 03:44 327 343   MPV 7.4 - 10.4 fL 10.8 (H)  7/7/25 03:44 11.4 (H) 10.8 (H)   Platelet Estimate Normal, Adequate  Normal  3/8/24 05:28     Gran % 38.0 - 73.0 % 57.7  7/22/22 04:55     Neutrophils Relative 47 - 80 % 68  5/12/23 03:18     Neut % % 78.1  7/7/25 03:44 80.3 78.1   Lymph % 18.0 - 48.0 % 29.7  7/22/22 04:55     LYMPH % % 12.8  7/7/25 03:44 13.5 12.8   Lymphocyte % 13 - 40 % 26  5/12/23 03:18     Mono % % 6.5  7/7/25 03:44 4.6 6.5   Eos % % 1.5  7/7/25 03:44 0.7 1.5   Basophil % % 0.4  7/7/25 03:44 0.3 0.4   Immature Granulocytes % 0.7  7/7/25 03:44 0.6 0.7   Gran # (ANC) 1.8 - 7.7 K/uL 3.6  7/22/22 04:55     Neutrophils Abs Calc 2.1 - 9.2 x10(3)/mcL 5.7392  5/12/23 03:18     Neut # 2.1 - 9.2 x10(3)/mcL 10.10 (H)  7/7/25 03:44 10.76 (H) 10.10 (H)   Lymph # 0.6 - 4.6 x10(3)/mcL 1.65  7/7/25 03:44 1.81 1.65   Mono # 0.1 - 1.3 x10(3)/mcL 0.84  7/7/25 " 03:44 0.62 0.84   Eos # 0 - 0.9 x10(3)/mcL 0.20  7/7/25 03:44 0.10 0.20   Baso # <=0.2 x10(3)/mcL 0.05  7/7/25 03:44 0.04 0.05   Immature Grans (Abs) 0.00 - 0.04 x10(3)/mcL 0.09 (H)  7/7/25 03:44 0.08 (H) 0.09 (H)   nRBC % 0.0  7/7/25 03:44 0.0 0.0   Differential Method  Automated  7/22/22 04:55     Macrocytosis (none)  1+ !  5/12/23 03:18     Microcytosis (none)  2+ !  5/15/23 03:18     Aniso (none)  2+ !  5/30/23 16:57     Poikilocytosis (none)  2+ !  5/12/23 03:18     Poly (none)  1+ !  2/10/23 14:57     Hypo (none)  1+ !  5/30/23 16:57     Elliptocytosis (none)  Slight !  5/12/23 03:18     RBC Morph Normal  Normal  3/8/24 05:28     Spherocytes (none)  1+ !  5/12/23 03:18     Stomatocytes (none)  1+ !  5/12/23 03:18     Target Cells (none)  1+ !  5/12/23 03:18     Peripheral Smear Evaluation  - Normocytic, normochromic anemia.  - Thrombocytopenia with occasional small platelet clumps.    Impression: Normocytic anemia can be seen in early iron deficiency anemia, anemia of chronic disease and acute blood loss. Thrombocytopenia can be due to artifactual platelet clumping, decreased production, increased destruction (immune and non-immune mediated) or due to splenic sequestration. Recommend repeat CBC in a blue top (citrate) tube to eliminate the possibility of pseudothrombocytopenia due to platelet clumping.    Maldonado Ariza M.D.     3/21/24 10:41     (H): Data is abnormally high  (L): Data is abnormally low  !: Data is abnormal     Latest Reference Range & Units Most Recent 07/07/25 03:44 07/07/25 12:45   Sodium 136 - 145 mmol/L 135 (L)  7/7/25 03:44 135 (L)    Potassium 3.5 - 5.1 mmol/L 3.9  7/7/25 03:44 3.9    Potassium 3.5 - 5.1 mmol/L 4.1 (E)  12/13/22 06:26     Chloride 98 - 107 mmol/L 105  7/7/25 03:44 105    CO2 22 - 29 mmol/L 21 (L)  7/7/25 03:44 21 (L)    Anion Gap mEq/L 9.0  7/7/25 03:44 9.0    BUN 8.4 - 25.7 mg/dL 39.8 (H)  7/7/25 03:44 39.8 (H)    Creatinine 0.72 - 1.25 mg/dL 1.92 (H)  7/7/25 03:44  1.92 (H)    BUN/CREAT RATIO  21  7/7/25 03:44 21    eGFR mL/min/1.73/m2 40  7/7/25 03:44 40    eGFR if non African American >60 mL/min/1.73 m^2 >60.0  7/22/22 04:55     eGFR if African American mL/min/1.73mSq 112 (E)  12/13/22 06:26     Glucose 74 - 100 mg/dL 367 (H)  7/7/25 03:44 367 (H)    Calcium 8.4 - 10.2 mg/dL 7.8 (L)  7/7/25 03:44 7.8 (L)    Phosphorus Level 2.3 - 4.7 mg/dL 3.2  10/10/24 06:27     Magnesium  1.60 - 2.60 mg/dL 2.00  10/10/24 06:27     ALP 40 - 150 unit/L 97  7/7/25 03:44 97    PROTEIN TOTAL 6.4 - 8.3 gm/dL 6.2 (L)  7/7/25 03:44 6.2 (L)    Albumin 3.5 - 5.0 g/dL 1.6 (L)  7/7/25 03:44 1.6 (L)    Albumin/Globulin Ratio 1.1 - 2.0 ratio 0.3 (L)  7/7/25 03:44 0.3 (L)    Prealbumin 18.0 - 45.0 mg/dL 5.9 (L)  7/7/25 12:45  5.9 (L)   BILIRUBIN TOTAL <=1.5 mg/dL 0.2  7/7/25 03:44 0.2    Bilirubin Direct 0.0 - 0.5  0.2  4/19/22 12:58     Bilirubin, Indirect 0.00 - 0.80  0.30  4/19/22 12:58     AST 11 - 45 unit/L 6 (L)  7/7/25 03:44 6 (L)    ALT 0 - 55 unit/L 6  7/7/25 03:44 6    CRP <5.00 mg/L 81.00 (H)  12/19/24 08:30     Globulin, Total 2.4 - 3.5 gm/dL 4.6 (H)  7/7/25 03:44 4.6 (H)    (L): Data is abnormally low  (H): Data is abnormally high  (E): External lab result    Echo:  EF:60%  No valve dysfunction reported.    EKG:  EKG 12-lead  Order: [id number]   Status: Final result       Next appt: None       Dx: Screening for cardiovascular condition    Test Result Released: Yes (not seen)    0 Result Notes      Component  Ref Range & Units (hover) 3 d ago   QRS Duration 108   [facility] QTC Calculation 473   Resulting Agency [facility]              Narrative  Performed by: [facility]  Test Reason : Z13.6,    Vent. Rate :  72 BPM     Atrial Rate :  72 BPM     P-R Int : 128 ms          QRS Dur : 108 ms      QT Int : 432 ms       P-R-T Axes :  25  81  30 degrees    QTcB Int : 473 ms    Normal sinus rhythm  Incomplete right bundle branch block  Borderline Abnormal ECG  Confirmed by Michael Corley ([id number]) on 7/5/2025 12:47:41  PM    Referred By:            Confirmed By: Michael Corley   Specimen Collected: 07/05/25 12:17 CDT Last Resulted: 07/05/25 12:47 CDT                             Pre-op Assessment    I have reviewed the Patient Summary Reports.     I have reviewed the Nursing Notes. I have reviewed the NPO Status.   I have reviewed the Medications.     Review of Systems  Anesthesia Hx:  No problems with previous Anesthesia                Social:  Non-Smoker       Hematology/Oncology:  Hematology Normal   Oncology Normal                                   EENT/Dental:  EENT/Dental Normal           Cardiovascular:  Cardiovascular Normal Exercise tolerance: good                     Functional Capacity good / => 4 METS                     Atrial Fibrillation, Atrial Flutter     Pulmonary:        Sleep Apnea     Obstructive Sleep Apnea (LIZBETH).           Renal/:  Renal/ Normal                 Hepatic/GI:  Hepatic/GI Normal                    Musculoskeletal:  Musculoskeletal Normal                Neurological:  Neurology Normal                                      Endocrine:  Diabetes    Diabetes                      Dermatological:  Skin Normal    Psych:  Psychiatric Normal                    Physical Exam  General: Alert, Oriented, Well nourished and Cooperative    Airway:  Mallampati: II   Mouth Opening: Normal  TM Distance: Normal  Tongue: Normal  Neck ROM: Normal ROM    Dental:  Intact    Chest/Lungs:  Clear to auscultation, Normal Respiratory Rate    Heart:  Rate: Normal  Rhythm: Regular Rhythm        Anesthesia Plan  Type of Anesthesia, risks & benefits discussed:    Anesthesia Type: Gen ETT  Intra-op Monitoring Plan: Standard ASA Monitors  Post Op Pain Control Plan: IV/PO Opioids PRN  Induction:  IV and Inhalation  Airway Plan: Direct  Informed Consent: Informed consent signed with the Patient and all parties understand the risks and agree with anesthesia plan.  All questions answered. Patient consented to blood products? Yes  ASA  Score: 3  Day of Surgery Review of History & Physical: H&P Update referred to the surgeon/provider.    Ready For Surgery From Anesthesia Perspective.     .

## 2025-07-08 NOTE — PROCEDURES
"Antonio Galvan II is a 59 y.o. male patient.    Temp: 99.7 °F (37.6 °C) (07/07/25 2018)  Pulse: 79 (07/07/25 2134)  Resp: 20 (07/07/25 2344)  BP: 134/78 (07/07/25 2134)  SpO2: 97 % (07/07/25 2018)  Weight: 90.7 kg (199 lb 15.3 oz) (07/06/25 0905)  Height: 5' 8" (172.7 cm) (07/07/25 1120)    PICC  Date/Time: 7/8/2025 1:45 AM  Performed by: Jacob John RN  Consent Done: Yes  Time out: Immediately prior to procedure a time out was called to verify the correct patient, procedure, equipment, support staff and site/side marked as required  Indications: med administration and vascular access  Anesthesia: local infiltration  Local anesthetic: lidocaine 1% without epinephrine  Anesthetic Total (mL): 3  Preparation: skin prepped with ChloraPrep  Skin prep agent dried: skin prep agent completely dried prior to procedure  Sterile barriers: all five maximum sterile barriers used - cap, mask, sterile gown, sterile gloves, and large sterile sheet  Hand hygiene: hand hygiene performed prior to central venous catheter insertion  Location details: left basilic  Catheter type: double lumen  Catheter size: 5 Fr  Catheter Length: 42cm    Ultrasound guidance: yes  Vessel Caliber: medium and patent, compressibility normal  Needle advanced into vessel with real time Ultrasound guidance.  Guidewire confirmed in vessel.  Sterile sheath used.  Number of attempts: 1  Post-procedure: blood return through all ports and sterile dressing applied    Complications: none          Jacob John RN  7/8/2025    "
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
## 2025-07-09 ENCOUNTER — HOSPITAL ENCOUNTER (INPATIENT)
Facility: HOSPITAL | Age: 60
LOS: 43 days | Discharge: HOME HEALTH | DRG: 579 | End: 2025-08-21
Attending: INTERNAL MEDICINE | Admitting: INTERNAL MEDICINE
Payer: MEDICARE

## 2025-07-09 VITALS
SYSTOLIC BLOOD PRESSURE: 127 MMHG | BODY MASS INDEX: 30.3 KG/M2 | DIASTOLIC BLOOD PRESSURE: 77 MMHG | HEIGHT: 68 IN | HEART RATE: 86 BPM | WEIGHT: 199.94 LBS | RESPIRATION RATE: 18 BRPM | TEMPERATURE: 98 F | OXYGEN SATURATION: 97 %

## 2025-07-09 DIAGNOSIS — L89.154 PRESSURE ULCER OF SACRAL REGION, STAGE 4: Chronic | ICD-10-CM

## 2025-07-09 DIAGNOSIS — G82.50 QUADRIPLEGIA: Primary | Chronic | ICD-10-CM

## 2025-07-09 DIAGNOSIS — L89.154 PRESSURE INJURY OF SACRAL REGION, STAGE 4: ICD-10-CM

## 2025-07-09 LAB
ALBUMIN SERPL-MCNC: 1.6 G/DL (ref 3.5–5)
ALBUMIN/GLOB SERPL: 0.4 RATIO (ref 1.1–2)
ALP SERPL-CCNC: 93 UNIT/L (ref 40–150)
ALT SERPL-CCNC: 7 UNIT/L (ref 0–55)
ANION GAP SERPL CALC-SCNC: 10 MEQ/L
AST SERPL-CCNC: 6 UNIT/L (ref 11–45)
BASOPHILS # BLD AUTO: 0.05 X10(3)/MCL
BASOPHILS NFR BLD AUTO: 0.5 %
BILIRUB SERPL-MCNC: 0.2 MG/DL
BUN SERPL-MCNC: 43.6 MG/DL (ref 8.4–25.7)
CALCIUM SERPL-MCNC: 8 MG/DL (ref 8.4–10.2)
CHLORIDE SERPL-SCNC: 101 MMOL/L (ref 98–107)
CO2 SERPL-SCNC: 24 MMOL/L (ref 22–29)
COLOR STL: NORMAL
CONSISTENCY STL: NORMAL
CREAT SERPL-MCNC: 1.33 MG/DL (ref 0.72–1.25)
CREAT/UREA NIT SERPL: 33
EOSINOPHIL # BLD AUTO: 0.35 X10(3)/MCL (ref 0–0.9)
EOSINOPHIL NFR BLD AUTO: 3.4 %
ERYTHROCYTE [DISTWIDTH] IN BLOOD BY AUTOMATED COUNT: 16.3 % (ref 11.5–17)
GFR SERPLBLD CREATININE-BSD FMLA CKD-EPI: >60 ML/MIN/1.73/M2
GLOBULIN SER-MCNC: 4.5 GM/DL (ref 2.4–3.5)
GLUCOSE SERPL-MCNC: 138 MG/DL (ref 74–100)
HCT VFR BLD AUTO: 26.4 % (ref 42–52)
HEMOCCULT SP1 STL QL: NEGATIVE
HGB BLD-MCNC: 8 G/DL (ref 14–18)
IMM GRANULOCYTES # BLD AUTO: 0.08 X10(3)/MCL (ref 0–0.04)
IMM GRANULOCYTES NFR BLD AUTO: 0.8 %
LYMPHOCYTES # BLD AUTO: 2.2 X10(3)/MCL (ref 0.6–4.6)
LYMPHOCYTES NFR BLD AUTO: 21.3 %
MCH RBC QN AUTO: 25.2 PG (ref 27–31)
MCHC RBC AUTO-ENTMCNC: 30.3 G/DL (ref 33–36)
MCV RBC AUTO: 83 FL (ref 80–94)
MONOCYTES # BLD AUTO: 0.65 X10(3)/MCL (ref 0.1–1.3)
MONOCYTES NFR BLD AUTO: 6.3 %
NEUTROPHILS # BLD AUTO: 6.98 X10(3)/MCL (ref 2.1–9.2)
NEUTROPHILS NFR BLD AUTO: 67.7 %
NRBC BLD AUTO-RTO: 0 %
PLATELET # BLD AUTO: 328 X10(3)/MCL (ref 130–400)
PMV BLD AUTO: 10.8 FL (ref 7.4–10.4)
POCT GLUCOSE: 167 MG/DL (ref 70–110)
POCT GLUCOSE: 168 MG/DL (ref 70–110)
POTASSIUM SERPL-SCNC: 4.4 MMOL/L (ref 3.5–5.1)
PROT SERPL-MCNC: 6.1 GM/DL (ref 6.4–8.3)
RBC # BLD AUTO: 3.18 X10(6)/MCL (ref 4.7–6.1)
SODIUM SERPL-SCNC: 135 MMOL/L (ref 136–145)
WBC # BLD AUTO: 10.31 X10(3)/MCL (ref 4.5–11.5)

## 2025-07-09 PROCEDURE — 25000003 PHARM REV CODE 250: Performed by: INTERNAL MEDICINE

## 2025-07-09 PROCEDURE — 99233 SBSQ HOSP IP/OBS HIGH 50: CPT | Mod: ,,,

## 2025-07-09 PROCEDURE — 63600175 PHARM REV CODE 636 W HCPCS: Performed by: INTERNAL MEDICINE

## 2025-07-09 PROCEDURE — 36415 COLL VENOUS BLD VENIPUNCTURE: CPT | Performed by: INTERNAL MEDICINE

## 2025-07-09 PROCEDURE — 94760 N-INVAS EAR/PLS OXIMETRY 1: CPT

## 2025-07-09 PROCEDURE — 99900035 HC TECH TIME PER 15 MIN (STAT)

## 2025-07-09 PROCEDURE — 94799 UNLISTED PULMONARY SVC/PX: CPT

## 2025-07-09 PROCEDURE — 85025 COMPLETE CBC W/AUTO DIFF WBC: CPT | Performed by: INTERNAL MEDICINE

## 2025-07-09 PROCEDURE — 27000207 HC ISOLATION

## 2025-07-09 PROCEDURE — 11000004 HC SNF PRIVATE

## 2025-07-09 PROCEDURE — 82272 OCCULT BLD FECES 1-3 TESTS: CPT | Performed by: INTERNAL MEDICINE

## 2025-07-09 PROCEDURE — 80053 COMPREHEN METABOLIC PANEL: CPT | Performed by: INTERNAL MEDICINE

## 2025-07-09 RX ORDER — SODIUM CHLORIDE 0.9 % (FLUSH) 0.9 %
10 SYRINGE (ML) INJECTION EVERY 12 HOURS PRN
Status: CANCELLED | OUTPATIENT
Start: 2025-07-09

## 2025-07-09 RX ORDER — TALC
6 POWDER (GRAM) TOPICAL NIGHTLY PRN
Status: DISCONTINUED | OUTPATIENT
Start: 2025-07-09 | End: 2025-08-21 | Stop reason: HOSPADM

## 2025-07-09 RX ORDER — ENOXAPARIN SODIUM 100 MG/ML
40 INJECTION SUBCUTANEOUS DAILY
Status: ON HOLD
Start: 2025-07-09

## 2025-07-09 RX ORDER — DILTIAZEM HYDROCHLORIDE 120 MG/1
120 CAPSULE, COATED, EXTENDED RELEASE ORAL DAILY
Status: DISCONTINUED | OUTPATIENT
Start: 2025-07-10 | End: 2025-08-21 | Stop reason: HOSPADM

## 2025-07-09 RX ORDER — SODIUM CHLORIDE 0.9 % (FLUSH) 0.9 %
10 SYRINGE (ML) INJECTION
Status: CANCELLED | OUTPATIENT
Start: 2025-07-09

## 2025-07-09 RX ORDER — ENOXAPARIN SODIUM 100 MG/ML
30 INJECTION SUBCUTANEOUS EVERY 24 HOURS
Status: DISCONTINUED | OUTPATIENT
Start: 2025-07-10 | End: 2025-07-10

## 2025-07-09 RX ORDER — GLUCAGON 1 MG
1 KIT INJECTION
Status: DISCONTINUED | OUTPATIENT
Start: 2025-07-09 | End: 2025-08-13

## 2025-07-09 RX ORDER — TALC
6 POWDER (GRAM) TOPICAL NIGHTLY PRN
Status: CANCELLED | OUTPATIENT
Start: 2025-07-09

## 2025-07-09 RX ORDER — SODIUM CHLORIDE 0.9 % (FLUSH) 0.9 %
10 SYRINGE (ML) INJECTION EVERY 12 HOURS PRN
Status: DISCONTINUED | OUTPATIENT
Start: 2025-07-09 | End: 2025-08-21 | Stop reason: HOSPADM

## 2025-07-09 RX ORDER — GLUCAGON 1 MG
1 KIT INJECTION
Status: CANCELLED | OUTPATIENT
Start: 2025-07-09

## 2025-07-09 RX ORDER — IBUPROFEN 200 MG
24 TABLET ORAL
Status: CANCELLED | OUTPATIENT
Start: 2025-07-09

## 2025-07-09 RX ORDER — INSULIN ASPART 100 [IU]/ML
0-10 INJECTION, SOLUTION INTRAVENOUS; SUBCUTANEOUS
Status: DISCONTINUED | OUTPATIENT
Start: 2025-07-09 | End: 2025-08-13

## 2025-07-09 RX ORDER — TRAMADOL HYDROCHLORIDE 50 MG/1
50 TABLET, FILM COATED ORAL EVERY 6 HOURS PRN
Status: DISCONTINUED | OUTPATIENT
Start: 2025-07-09 | End: 2025-08-21 | Stop reason: HOSPADM

## 2025-07-09 RX ORDER — INSULIN GLARGINE 100 [IU]/ML
25 INJECTION, SOLUTION SUBCUTANEOUS NIGHTLY
Status: CANCELLED | OUTPATIENT
Start: 2025-07-09

## 2025-07-09 RX ORDER — INSULIN GLARGINE 100 [IU]/ML
25 INJECTION, SOLUTION SUBCUTANEOUS NIGHTLY
Status: DISCONTINUED | OUTPATIENT
Start: 2025-07-09 | End: 2025-07-10

## 2025-07-09 RX ORDER — INSULIN ASPART 100 [IU]/ML
0-10 INJECTION, SOLUTION INTRAVENOUS; SUBCUTANEOUS
Status: CANCELLED | OUTPATIENT
Start: 2025-07-09

## 2025-07-09 RX ORDER — IBUPROFEN 200 MG
16 TABLET ORAL
Status: DISCONTINUED | OUTPATIENT
Start: 2025-07-09 | End: 2025-08-13

## 2025-07-09 RX ORDER — MUPIROCIN 20 MG/G
OINTMENT TOPICAL 2 TIMES DAILY
Status: CANCELLED | OUTPATIENT
Start: 2025-07-09 | End: 2025-07-11

## 2025-07-09 RX ORDER — MUPIROCIN 20 MG/G
OINTMENT TOPICAL 2 TIMES DAILY
Status: DISCONTINUED | OUTPATIENT
Start: 2025-07-09 | End: 2025-07-10

## 2025-07-09 RX ORDER — DILTIAZEM HYDROCHLORIDE 120 MG/1
120 CAPSULE, COATED, EXTENDED RELEASE ORAL DAILY
Status: CANCELLED | OUTPATIENT
Start: 2025-07-10

## 2025-07-09 RX ORDER — TRAMADOL HYDROCHLORIDE 50 MG/1
50 TABLET, FILM COATED ORAL EVERY 6 HOURS PRN
Status: CANCELLED | OUTPATIENT
Start: 2025-07-09

## 2025-07-09 RX ORDER — ACETAMINOPHEN 325 MG/1
650 TABLET ORAL EVERY 8 HOURS PRN
Status: CANCELLED | OUTPATIENT
Start: 2025-07-09

## 2025-07-09 RX ORDER — MEROPENEM 1 G/1
1 INJECTION, POWDER, FOR SOLUTION INTRAVENOUS
Status: DISCONTINUED | OUTPATIENT
Start: 2025-07-10 | End: 2025-07-10

## 2025-07-09 RX ORDER — SODIUM CHLORIDE 0.9 % (FLUSH) 0.9 %
10 SYRINGE (ML) INJECTION
Status: DISCONTINUED | OUTPATIENT
Start: 2025-07-09 | End: 2025-08-21 | Stop reason: HOSPADM

## 2025-07-09 RX ORDER — MEROPENEM 1 G/1
1 INJECTION, POWDER, FOR SOLUTION INTRAVENOUS EVERY 12 HOURS
Status: ON HOLD
Start: 2025-07-09

## 2025-07-09 RX ORDER — ENOXAPARIN SODIUM 100 MG/ML
30 INJECTION SUBCUTANEOUS EVERY 24 HOURS
Status: CANCELLED | OUTPATIENT
Start: 2025-07-09

## 2025-07-09 RX ORDER — ACETAMINOPHEN 325 MG/1
650 TABLET ORAL EVERY 8 HOURS PRN
Status: DISCONTINUED | OUTPATIENT
Start: 2025-07-09 | End: 2025-08-21 | Stop reason: HOSPADM

## 2025-07-09 RX ORDER — MEROPENEM 1 G/1
1 INJECTION, POWDER, FOR SOLUTION INTRAVENOUS
Status: CANCELLED | OUTPATIENT
Start: 2025-07-09 | End: 2025-08-17

## 2025-07-09 RX ORDER — INSULIN ASPART 100 [IU]/ML
0-10 INJECTION, SOLUTION INTRAVENOUS; SUBCUTANEOUS
Status: ON HOLD
Start: 2025-07-09 | End: 2025-07-13

## 2025-07-09 RX ORDER — TRAMADOL HYDROCHLORIDE 50 MG/1
50 TABLET, FILM COATED ORAL EVERY 8 HOURS PRN
Status: ON HOLD
Start: 2025-07-09

## 2025-07-09 RX ORDER — OXYBUTYNIN CHLORIDE 5 MG/1
10 TABLET, EXTENDED RELEASE ORAL DAILY
Status: DISCONTINUED | OUTPATIENT
Start: 2025-07-10 | End: 2025-08-21 | Stop reason: HOSPADM

## 2025-07-09 RX ORDER — IBUPROFEN 200 MG
16 TABLET ORAL
Status: CANCELLED | OUTPATIENT
Start: 2025-07-09

## 2025-07-09 RX ORDER — OXYBUTYNIN CHLORIDE 5 MG/1
10 TABLET, EXTENDED RELEASE ORAL DAILY
Status: CANCELLED | OUTPATIENT
Start: 2025-07-10

## 2025-07-09 RX ORDER — IBUPROFEN 200 MG
24 TABLET ORAL
Status: DISCONTINUED | OUTPATIENT
Start: 2025-07-09 | End: 2025-08-13

## 2025-07-09 RX ADMIN — OXYBUTYNIN CHLORIDE 10 MG: 5 TABLET, EXTENDED RELEASE ORAL at 09:07

## 2025-07-09 RX ADMIN — INSULIN ASPART 2 UNITS: 100 INJECTION, SOLUTION INTRAVENOUS; SUBCUTANEOUS at 06:07

## 2025-07-09 RX ADMIN — INSULIN ASPART 3 UNITS: 100 INJECTION, SOLUTION INTRAVENOUS; SUBCUTANEOUS at 11:07

## 2025-07-09 RX ADMIN — MEROPENEM 1 G: 1 INJECTION, POWDER, FOR SOLUTION INTRAVENOUS at 01:07

## 2025-07-09 RX ADMIN — DILTIAZEM HYDROCHLORIDE 120 MG: 120 CAPSULE, COATED, EXTENDED RELEASE ORAL at 09:07

## 2025-07-09 RX ADMIN — MUPIROCIN: 20 OINTMENT TOPICAL at 09:07

## 2025-07-09 RX ADMIN — TRAMADOL HYDROCHLORIDE 50 MG: 50 TABLET, COATED ORAL at 09:07

## 2025-07-09 RX ADMIN — MUPIROCIN 1 G: 20 OINTMENT TOPICAL at 11:07

## 2025-07-09 RX ADMIN — MEROPENEM 1 G: 1 INJECTION, POWDER, FOR SOLUTION INTRAVENOUS at 03:07

## 2025-07-09 RX ADMIN — INSULIN GLARGINE 25 UNITS: 100 INJECTION, SOLUTION SUBCUTANEOUS at 11:07
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
## 2025-07-09 NOTE — PLAN OF CARE
Problem: Adult Inpatient Plan of Care  Goal: Plan of Care Review  Outcome: Progressing  Goal: Patient-Specific Goal (Individualized)  Outcome: Progressing  Goal: Absence of Hospital-Acquired Illness or Injury  Outcome: Progressing  Goal: Optimal Comfort and Wellbeing  Outcome: Progressing  Goal: Readiness for Transition of Care  Outcome: Progressing      The patient is a 25y Male complaining of rash.
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
## 2025-07-09 NOTE — PLAN OF CARE
0819: Reached out to Abby with [facility], pending Dr. Mann to review information prior to acceptance decision.
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
## 2025-07-09 NOTE — PLAN OF CARE
07/09/25 1317   Final Note   Assessment Type Final Discharge Note   Anticipated Discharge Disposition Swing Bed   Post-Acute Status   Post-Acute Authorization Placement   Post-Acute Placement Status Set-up Complete/Auth obtained  ([facility]Swing Bed)   Discharge Delays (!) Ambulance Transport/Facility Transport     Transport via [facility].    no

## 2025-07-10 LAB
BACTERIA BLD CULT: NORMAL
BACTERIA BLD CULT: NORMAL
POCT GLUCOSE: 259 MG/DL (ref 70–110)
POCT GLUCOSE: 281 MG/DL (ref 70–110)
POCT GLUCOSE: 302 MG/DL (ref 70–110)
POCT GLUCOSE: 376 MG/DL (ref 70–110)

## 2025-07-10 PROCEDURE — 11000004 HC SNF PRIVATE

## 2025-07-10 PROCEDURE — 27000207 HC ISOLATION

## 2025-07-10 PROCEDURE — 25000003 PHARM REV CODE 250: Performed by: INTERNAL MEDICINE

## 2025-07-10 PROCEDURE — 63600175 PHARM REV CODE 636 W HCPCS: Performed by: PHYSICIAN ASSISTANT

## 2025-07-10 PROCEDURE — 63600175 PHARM REV CODE 636 W HCPCS: Performed by: INTERNAL MEDICINE

## 2025-07-10 RX ORDER — MEROPENEM 1 G/1
1 INJECTION, POWDER, FOR SOLUTION INTRAVENOUS
Status: DISPENSED | OUTPATIENT
Start: 2025-07-10 | End: 2025-08-20

## 2025-07-10 RX ORDER — ENOXAPARIN SODIUM 100 MG/ML
40 INJECTION SUBCUTANEOUS EVERY 24 HOURS
Status: DISCONTINUED | OUTPATIENT
Start: 2025-07-10 | End: 2025-08-21 | Stop reason: HOSPADM

## 2025-07-10 RX ORDER — INSULIN GLARGINE 100 [IU]/ML
28 INJECTION, SOLUTION SUBCUTANEOUS NIGHTLY
Status: DISCONTINUED | OUTPATIENT
Start: 2025-07-10 | End: 2025-07-11

## 2025-07-10 RX ADMIN — MUPIROCIN 1 G: 20 OINTMENT TOPICAL at 09:07

## 2025-07-10 RX ADMIN — INSULIN ASPART 6 UNITS: 100 INJECTION, SOLUTION INTRAVENOUS; SUBCUTANEOUS at 05:07

## 2025-07-10 RX ADMIN — OXYBUTYNIN CHLORIDE 10 MG: 5 TABLET, EXTENDED RELEASE ORAL at 09:07

## 2025-07-10 RX ADMIN — MEROPENEM 1 G: 1 INJECTION, POWDER, FOR SOLUTION INTRAVENOUS at 02:07

## 2025-07-10 RX ADMIN — MEROPENEM 1 G: 1 INJECTION, POWDER, FOR SOLUTION INTRAVENOUS at 09:07

## 2025-07-10 RX ADMIN — INSULIN GLARGINE 28 UNITS: 100 INJECTION, SOLUTION SUBCUTANEOUS at 10:07

## 2025-07-10 RX ADMIN — DILTIAZEM HYDROCHLORIDE 120 MG: 120 CAPSULE, COATED, EXTENDED RELEASE ORAL at 09:07

## 2025-07-10 RX ADMIN — MEROPENEM 1 G: 1 INJECTION, POWDER, FOR SOLUTION INTRAVENOUS at 12:07

## 2025-07-10 RX ADMIN — ENOXAPARIN SODIUM 40 MG: 40 INJECTION SUBCUTANEOUS at 04:07

## 2025-07-10 RX ADMIN — TRAMADOL HYDROCHLORIDE 50 MG: 50 TABLET, COATED ORAL at 03:07

## 2025-07-10 RX ADMIN — INSULIN ASPART 5 UNITS: 100 INJECTION, SOLUTION INTRAVENOUS; SUBCUTANEOUS at 10:07

## 2025-07-10 RX ADMIN — DAPTOMYCIN 600 MG: 500 INJECTION, POWDER, LYOPHILIZED, FOR SOLUTION INTRAVENOUS at 03:07

## 2025-07-11 LAB
POCT GLUCOSE: 249 MG/DL (ref 70–110)
POCT GLUCOSE: 275 MG/DL (ref 70–110)
POCT GLUCOSE: 313 MG/DL (ref 70–110)

## 2025-07-11 PROCEDURE — 11000004 HC SNF PRIVATE

## 2025-07-11 PROCEDURE — 27000207 HC ISOLATION

## 2025-07-11 PROCEDURE — 63600175 PHARM REV CODE 636 W HCPCS: Performed by: INTERNAL MEDICINE

## 2025-07-11 PROCEDURE — 63600175 PHARM REV CODE 636 W HCPCS: Performed by: PHYSICIAN ASSISTANT

## 2025-07-11 PROCEDURE — 25000003 PHARM REV CODE 250: Performed by: INTERNAL MEDICINE

## 2025-07-11 RX ORDER — INSULIN GLARGINE 100 [IU]/ML
15 INJECTION, SOLUTION SUBCUTANEOUS 2 TIMES DAILY
Status: DISCONTINUED | OUTPATIENT
Start: 2025-07-11 | End: 2025-07-12

## 2025-07-11 RX ADMIN — INSULIN GLARGINE 15 UNITS: 100 INJECTION, SOLUTION SUBCUTANEOUS at 09:07

## 2025-07-11 RX ADMIN — MEROPENEM 1 G: 1 INJECTION, POWDER, FOR SOLUTION INTRAVENOUS at 08:07

## 2025-07-11 RX ADMIN — DAPTOMYCIN 600 MG: 500 INJECTION, POWDER, LYOPHILIZED, FOR SOLUTION INTRAVENOUS at 02:07

## 2025-07-11 RX ADMIN — DILTIAZEM HYDROCHLORIDE 120 MG: 120 CAPSULE, COATED, EXTENDED RELEASE ORAL at 12:07

## 2025-07-11 RX ADMIN — MEROPENEM 1 G: 1 INJECTION, POWDER, FOR SOLUTION INTRAVENOUS at 05:07

## 2025-07-11 RX ADMIN — MEROPENEM 1 G: 1 INJECTION, POWDER, FOR SOLUTION INTRAVENOUS at 12:07

## 2025-07-11 RX ADMIN — INSULIN ASPART 4 UNITS: 100 INJECTION, SOLUTION INTRAVENOUS; SUBCUTANEOUS at 09:07

## 2025-07-11 RX ADMIN — INSULIN ASPART 8 UNITS: 100 INJECTION, SOLUTION INTRAVENOUS; SUBCUTANEOUS at 05:07

## 2025-07-11 RX ADMIN — INSULIN ASPART 4 UNITS: 100 INJECTION, SOLUTION INTRAVENOUS; SUBCUTANEOUS at 05:07

## 2025-07-11 RX ADMIN — INSULIN ASPART 6 UNITS: 100 INJECTION, SOLUTION INTRAVENOUS; SUBCUTANEOUS at 12:07

## 2025-07-11 RX ADMIN — OXYBUTYNIN CHLORIDE 10 MG: 5 TABLET, EXTENDED RELEASE ORAL at 09:07

## 2025-07-11 RX ADMIN — ENOXAPARIN SODIUM 40 MG: 40 INJECTION SUBCUTANEOUS at 05:07

## 2025-07-12 LAB
POCT GLUCOSE: 243 MG/DL (ref 70–110)
POCT GLUCOSE: 244 MG/DL (ref 70–110)
POCT GLUCOSE: 255 MG/DL (ref 70–110)
POCT GLUCOSE: 294 MG/DL (ref 70–110)
POCT GLUCOSE: 327 MG/DL (ref 70–110)

## 2025-07-12 PROCEDURE — 63600175 PHARM REV CODE 636 W HCPCS: Performed by: INTERNAL MEDICINE

## 2025-07-12 PROCEDURE — 11000004 HC SNF PRIVATE

## 2025-07-12 PROCEDURE — 27000207 HC ISOLATION

## 2025-07-12 PROCEDURE — 63600175 PHARM REV CODE 636 W HCPCS: Mod: JZ,TB | Performed by: PHYSICIAN ASSISTANT

## 2025-07-12 PROCEDURE — 25000003 PHARM REV CODE 250: Performed by: INTERNAL MEDICINE

## 2025-07-12 RX ORDER — INSULIN GLARGINE 100 [IU]/ML
18 INJECTION, SOLUTION SUBCUTANEOUS 2 TIMES DAILY
Status: DISCONTINUED | OUTPATIENT
Start: 2025-07-12 | End: 2025-07-13

## 2025-07-12 RX ADMIN — OXYBUTYNIN CHLORIDE 10 MG: 5 TABLET, EXTENDED RELEASE ORAL at 09:07

## 2025-07-12 RX ADMIN — DAPTOMYCIN 600 MG: 500 INJECTION, POWDER, LYOPHILIZED, FOR SOLUTION INTRAVENOUS at 03:07

## 2025-07-12 RX ADMIN — INSULIN ASPART 4 UNITS: 100 INJECTION, SOLUTION INTRAVENOUS; SUBCUTANEOUS at 05:07

## 2025-07-12 RX ADMIN — ENOXAPARIN SODIUM 40 MG: 40 INJECTION SUBCUTANEOUS at 04:07

## 2025-07-12 RX ADMIN — MEROPENEM 1 G: 1 INJECTION, POWDER, FOR SOLUTION INTRAVENOUS at 05:07

## 2025-07-12 RX ADMIN — DILTIAZEM HYDROCHLORIDE 120 MG: 120 CAPSULE, COATED, EXTENDED RELEASE ORAL at 09:07

## 2025-07-12 RX ADMIN — INSULIN ASPART 6 UNITS: 100 INJECTION, SOLUTION INTRAVENOUS; SUBCUTANEOUS at 11:07

## 2025-07-12 RX ADMIN — INSULIN ASPART 2 UNITS: 100 INJECTION, SOLUTION INTRAVENOUS; SUBCUTANEOUS at 08:07

## 2025-07-12 RX ADMIN — MEROPENEM 1 G: 1 INJECTION, POWDER, FOR SOLUTION INTRAVENOUS at 01:07

## 2025-07-12 RX ADMIN — ALTEPLASE 2 MG: 2.2 INJECTION, POWDER, LYOPHILIZED, FOR SOLUTION INTRAVENOUS at 01:07

## 2025-07-12 RX ADMIN — TRAMADOL HYDROCHLORIDE 50 MG: 50 TABLET, COATED ORAL at 08:07

## 2025-07-12 RX ADMIN — INSULIN GLARGINE 18 UNITS: 100 INJECTION, SOLUTION SUBCUTANEOUS at 08:07

## 2025-07-12 RX ADMIN — MEROPENEM 1 G: 1 INJECTION, POWDER, FOR SOLUTION INTRAVENOUS at 08:07

## 2025-07-12 RX ADMIN — INSULIN GLARGINE 18 UNITS: 100 INJECTION, SOLUTION SUBCUTANEOUS at 09:07

## 2025-07-12 RX ADMIN — INSULIN ASPART 6 UNITS: 100 INJECTION, SOLUTION INTRAVENOUS; SUBCUTANEOUS at 04:07

## 2025-07-13 LAB
POCT GLUCOSE: 246 MG/DL (ref 70–110)
POCT GLUCOSE: 280 MG/DL (ref 70–110)
POCT GLUCOSE: 303 MG/DL (ref 70–110)

## 2025-07-13 PROCEDURE — 63600175 PHARM REV CODE 636 W HCPCS: Performed by: FAMILY MEDICINE

## 2025-07-13 PROCEDURE — 11000004 HC SNF PRIVATE

## 2025-07-13 PROCEDURE — 25000003 PHARM REV CODE 250: Performed by: INTERNAL MEDICINE

## 2025-07-13 PROCEDURE — 27000207 HC ISOLATION

## 2025-07-13 PROCEDURE — 63600175 PHARM REV CODE 636 W HCPCS: Performed by: INTERNAL MEDICINE

## 2025-07-13 RX ORDER — INSULIN GLARGINE 100 [IU]/ML
70 INJECTION, SOLUTION SUBCUTANEOUS 2 TIMES DAILY
Status: ON HOLD | COMMUNITY
End: 2025-08-21 | Stop reason: HOSPADM

## 2025-07-13 RX ORDER — INSULIN GLARGINE 100 [IU]/ML
60 INJECTION, SOLUTION SUBCUTANEOUS 2 TIMES DAILY
Status: DISCONTINUED | OUTPATIENT
Start: 2025-07-13 | End: 2025-07-14

## 2025-07-13 RX ADMIN — MEROPENEM 1 G: 1 INJECTION, POWDER, FOR SOLUTION INTRAVENOUS at 05:07

## 2025-07-13 RX ADMIN — INSULIN GLARGINE 60 UNITS: 100 INJECTION, SOLUTION SUBCUTANEOUS at 08:07

## 2025-07-13 RX ADMIN — OXYBUTYNIN CHLORIDE 10 MG: 5 TABLET, EXTENDED RELEASE ORAL at 09:07

## 2025-07-13 RX ADMIN — DILTIAZEM HYDROCHLORIDE 120 MG: 120 CAPSULE, COATED, EXTENDED RELEASE ORAL at 09:07

## 2025-07-13 RX ADMIN — TRAMADOL HYDROCHLORIDE 50 MG: 50 TABLET, COATED ORAL at 03:07

## 2025-07-13 RX ADMIN — INSULIN ASPART 4 UNITS: 100 INJECTION, SOLUTION INTRAVENOUS; SUBCUTANEOUS at 05:07

## 2025-07-13 RX ADMIN — INSULIN GLARGINE 18 UNITS: 100 INJECTION, SOLUTION SUBCUTANEOUS at 09:07

## 2025-07-13 RX ADMIN — INSULIN ASPART 8 UNITS: 100 INJECTION, SOLUTION INTRAVENOUS; SUBCUTANEOUS at 11:07

## 2025-07-13 RX ADMIN — ENOXAPARIN SODIUM 40 MG: 40 INJECTION SUBCUTANEOUS at 04:07

## 2025-07-13 RX ADMIN — INSULIN ASPART 6 UNITS: 100 INJECTION, SOLUTION INTRAVENOUS; SUBCUTANEOUS at 04:07

## 2025-07-13 RX ADMIN — DAPTOMYCIN 600 MG: 500 INJECTION, POWDER, LYOPHILIZED, FOR SOLUTION INTRAVENOUS at 02:07

## 2025-07-13 RX ADMIN — MEROPENEM 1 G: 1 INJECTION, POWDER, FOR SOLUTION INTRAVENOUS at 08:07

## 2025-07-13 RX ADMIN — MEROPENEM 1 G: 1 INJECTION, POWDER, FOR SOLUTION INTRAVENOUS at 01:07

## 2025-07-14 LAB
ANION GAP SERPL CALC-SCNC: 12 MEQ/L
BASOPHILS # BLD AUTO: 0.02 X10(3)/MCL
BASOPHILS NFR BLD AUTO: 0.2 %
BUN SERPL-MCNC: 71 MG/DL (ref 8.4–25.7)
CALCIUM SERPL-MCNC: 7.8 MG/DL (ref 8.4–10.2)
CHLORIDE SERPL-SCNC: 104 MMOL/L (ref 98–107)
CK SERPL-CCNC: 18 U/L (ref 30–200)
CO2 SERPL-SCNC: 20 MMOL/L (ref 22–29)
CREAT SERPL-MCNC: 1.1 MG/DL (ref 0.72–1.25)
CREAT/UREA NIT SERPL: 65
EOSINOPHIL # BLD AUTO: 0.32 X10(3)/MCL (ref 0–0.9)
EOSINOPHIL NFR BLD AUTO: 3.1 %
ERYTHROCYTE [DISTWIDTH] IN BLOOD BY AUTOMATED COUNT: 16.9 % (ref 11.5–17)
GFR SERPLBLD CREATININE-BSD FMLA CKD-EPI: >60 ML/MIN/1.73/M2
GLUCOSE SERPL-MCNC: 185 MG/DL (ref 74–100)
GLUCOSE SERPL-MCNC: 186 MG/DL (ref 70–110)
GLUCOSE SERPL-MCNC: 193 MG/DL (ref 70–110)
GLUCOSE SERPL-MCNC: 227 MG/DL (ref 70–110)
HCT VFR BLD AUTO: 26.1 % (ref 42–52)
HGB BLD-MCNC: 8 G/DL (ref 14–18)
IMM GRANULOCYTES # BLD AUTO: 0.1 X10(3)/MCL (ref 0–0.04)
IMM GRANULOCYTES NFR BLD AUTO: 1 %
LYMPHOCYTES # BLD AUTO: 3.02 X10(3)/MCL (ref 0.6–4.6)
LYMPHOCYTES NFR BLD AUTO: 29.2 %
MCH RBC QN AUTO: 25.8 PG (ref 27–31)
MCHC RBC AUTO-ENTMCNC: 30.7 G/DL (ref 33–36)
MCV RBC AUTO: 84.2 FL (ref 80–94)
MONOCYTES # BLD AUTO: 0.48 X10(3)/MCL (ref 0.1–1.3)
MONOCYTES NFR BLD AUTO: 4.6 %
NEUTROPHILS # BLD AUTO: 6.4 X10(3)/MCL (ref 2.1–9.2)
NEUTROPHILS NFR BLD AUTO: 61.9 %
PLATELET # BLD AUTO: 327 X10(3)/MCL (ref 130–400)
PMV BLD AUTO: 10.2 FL (ref 7.4–10.4)
POCT GLUCOSE: 186 MG/DL (ref 70–110)
POCT GLUCOSE: 193 MG/DL (ref 70–110)
POCT GLUCOSE: 218 MG/DL (ref 70–110)
POCT GLUCOSE: 227 MG/DL (ref 70–110)
POCT GLUCOSE: 234 MG/DL (ref 70–110)
POTASSIUM SERPL-SCNC: 5.1 MMOL/L (ref 3.5–5.1)
PREALB SERPL-MCNC: 15.6 MG/DL (ref 18–45)
RBC # BLD AUTO: 3.1 X10(6)/MCL (ref 4.7–6.1)
SODIUM SERPL-SCNC: 136 MMOL/L (ref 136–145)
WBC # BLD AUTO: 10.34 X10(3)/MCL (ref 4.5–11.5)

## 2025-07-14 PROCEDURE — 85025 COMPLETE CBC W/AUTO DIFF WBC: CPT | Performed by: FAMILY MEDICINE

## 2025-07-14 PROCEDURE — 84134 ASSAY OF PREALBUMIN: CPT | Performed by: PHYSICIAN ASSISTANT

## 2025-07-14 PROCEDURE — 63600175 PHARM REV CODE 636 W HCPCS: Performed by: INTERNAL MEDICINE

## 2025-07-14 PROCEDURE — 27000207 HC ISOLATION

## 2025-07-14 PROCEDURE — 25000003 PHARM REV CODE 250: Performed by: FAMILY MEDICINE

## 2025-07-14 PROCEDURE — 25000003 PHARM REV CODE 250: Performed by: PHYSICIAN ASSISTANT

## 2025-07-14 PROCEDURE — 80048 BASIC METABOLIC PNL TOTAL CA: CPT | Performed by: FAMILY MEDICINE

## 2025-07-14 PROCEDURE — 82550 ASSAY OF CK (CPK): CPT | Performed by: FAMILY MEDICINE

## 2025-07-14 PROCEDURE — 11000004 HC SNF PRIVATE

## 2025-07-14 PROCEDURE — 36415 COLL VENOUS BLD VENIPUNCTURE: CPT | Performed by: FAMILY MEDICINE

## 2025-07-14 PROCEDURE — 25000003 PHARM REV CODE 250: Performed by: INTERNAL MEDICINE

## 2025-07-14 PROCEDURE — 63600175 PHARM REV CODE 636 W HCPCS: Performed by: PHYSICIAN ASSISTANT

## 2025-07-14 RX ORDER — SELENIUM 50 MCG
1 TABLET ORAL
Status: DISCONTINUED | OUTPATIENT
Start: 2025-07-14 | End: 2025-08-21 | Stop reason: HOSPADM

## 2025-07-14 RX ORDER — ZINC SULFATE 50(220)MG
220 CAPSULE ORAL DAILY
Status: DISCONTINUED | OUTPATIENT
Start: 2025-07-14 | End: 2025-08-21 | Stop reason: HOSPADM

## 2025-07-14 RX ORDER — ASCORBIC ACID 500 MG
500 TABLET ORAL DAILY
Status: DISCONTINUED | OUTPATIENT
Start: 2025-07-14 | End: 2025-08-21 | Stop reason: HOSPADM

## 2025-07-14 RX ORDER — INSULIN GLARGINE 100 [IU]/ML
60 INJECTION, SOLUTION SUBCUTANEOUS NIGHTLY
Status: DISCONTINUED | OUTPATIENT
Start: 2025-07-14 | End: 2025-08-09

## 2025-07-14 RX ORDER — SODIUM CHLORIDE 9 MG/ML
INJECTION, SOLUTION INTRAVENOUS CONTINUOUS
Status: ACTIVE | OUTPATIENT
Start: 2025-07-14 | End: 2025-07-15

## 2025-07-14 RX ADMIN — SITAGLIPTIN 50 MG: 50 TABLET, FILM COATED ORAL at 09:07

## 2025-07-14 RX ADMIN — INSULIN GLARGINE 60 UNITS: 100 INJECTION, SOLUTION SUBCUTANEOUS at 08:07

## 2025-07-14 RX ADMIN — OXYCODONE HYDROCHLORIDE AND ACETAMINOPHEN 500 MG: 500 TABLET ORAL at 12:07

## 2025-07-14 RX ADMIN — SODIUM CHLORIDE: 9 INJECTION, SOLUTION INTRAVENOUS at 11:07

## 2025-07-14 RX ADMIN — ZINC SULFATE 220 MG (50 MG) CAPSULE 220 MG: CAPSULE at 12:07

## 2025-07-14 RX ADMIN — MEROPENEM 1 G: 1 INJECTION, POWDER, FOR SOLUTION INTRAVENOUS at 08:07

## 2025-07-14 RX ADMIN — INSULIN ASPART 4 UNITS: 100 INJECTION, SOLUTION INTRAVENOUS; SUBCUTANEOUS at 04:07

## 2025-07-14 RX ADMIN — MEROPENEM 1 G: 1 INJECTION, POWDER, FOR SOLUTION INTRAVENOUS at 01:07

## 2025-07-14 RX ADMIN — Medication 1 CAPSULE: at 12:07

## 2025-07-14 RX ADMIN — OXYBUTYNIN CHLORIDE 10 MG: 5 TABLET, EXTENDED RELEASE ORAL at 09:07

## 2025-07-14 RX ADMIN — Medication 1 CAPSULE: at 04:07

## 2025-07-14 RX ADMIN — DAPTOMYCIN 600 MG: 500 INJECTION, POWDER, LYOPHILIZED, FOR SOLUTION INTRAVENOUS at 03:07

## 2025-07-14 RX ADMIN — TRAMADOL HYDROCHLORIDE 50 MG: 50 TABLET, COATED ORAL at 05:07

## 2025-07-14 RX ADMIN — TRAMADOL HYDROCHLORIDE 50 MG: 50 TABLET, COATED ORAL at 09:07

## 2025-07-14 RX ADMIN — MEROPENEM 1 G: 1 INJECTION, POWDER, FOR SOLUTION INTRAVENOUS at 05:07

## 2025-07-14 RX ADMIN — DILTIAZEM HYDROCHLORIDE 120 MG: 120 CAPSULE, COATED, EXTENDED RELEASE ORAL at 09:07

## 2025-07-15 ENCOUNTER — ANESTHESIA (OUTPATIENT)
Dept: SURGERY | Facility: HOSPITAL | Age: 60
End: 2025-07-15
Payer: MEDICARE

## 2025-07-15 ENCOUNTER — HOSPITAL ENCOUNTER (OUTPATIENT)
Facility: HOSPITAL | Age: 60
Discharge: NURSING, REHAB OR LONG-TERM CARE | End: 2025-07-15
Attending: SURGERY | Admitting: SURGERY
Payer: MEDICARE

## 2025-07-15 ENCOUNTER — ANESTHESIA EVENT (OUTPATIENT)
Dept: SURGERY | Facility: HOSPITAL | Age: 60
End: 2025-07-15
Payer: MEDICARE

## 2025-07-15 VITALS
TEMPERATURE: 98 F | DIASTOLIC BLOOD PRESSURE: 69 MMHG | RESPIRATION RATE: 13 BRPM | HEART RATE: 73 BPM | SYSTOLIC BLOOD PRESSURE: 151 MMHG | OXYGEN SATURATION: 98 %

## 2025-07-15 LAB
ALBUMIN SERPL-MCNC: 1.9 G/DL (ref 3.5–5)
ALBUMIN/GLOB SERPL: 0.4 RATIO (ref 1.1–2)
ALP SERPL-CCNC: 83 UNIT/L (ref 40–150)
ALT SERPL-CCNC: 15 UNIT/L (ref 0–55)
ANION GAP SERPL CALC-SCNC: 3 MEQ/L
AST SERPL-CCNC: 11 UNIT/L (ref 11–45)
BASOPHILS # BLD AUTO: 0.02 X10(3)/MCL
BASOPHILS NFR BLD AUTO: 0.2 %
BILIRUB SERPL-MCNC: 0.2 MG/DL
BUN SERPL-MCNC: 63 MG/DL (ref 8.4–25.7)
CALCIUM SERPL-MCNC: 8.5 MG/DL (ref 8.4–10.2)
CHLORIDE SERPL-SCNC: 106 MMOL/L (ref 98–107)
CO2 SERPL-SCNC: 28 MMOL/L (ref 22–29)
CREAT SERPL-MCNC: 0.73 MG/DL (ref 0.72–1.25)
CREAT/UREA NIT SERPL: 86
EOSINOPHIL # BLD AUTO: 0.28 X10(3)/MCL (ref 0–0.9)
EOSINOPHIL NFR BLD AUTO: 2.9 %
ERYTHROCYTE [DISTWIDTH] IN BLOOD BY AUTOMATED COUNT: 17 % (ref 11.5–17)
GFR SERPLBLD CREATININE-BSD FMLA CKD-EPI: >60 ML/MIN/1.73/M2
GLOBULIN SER-MCNC: 4.6 GM/DL (ref 2.4–3.5)
GLUCOSE SERPL-MCNC: 123 MG/DL (ref 74–100)
GLUCOSE SERPL-MCNC: 153 MG/DL (ref 70–110)
GLUCOSE SERPL-MCNC: 164 MG/DL (ref 70–110)
HCT VFR BLD AUTO: 27.9 % (ref 42–52)
HGB BLD-MCNC: 8.6 G/DL (ref 14–18)
IMM GRANULOCYTES # BLD AUTO: 0.08 X10(3)/MCL (ref 0–0.04)
IMM GRANULOCYTES NFR BLD AUTO: 0.8 %
LYMPHOCYTES # BLD AUTO: 2.83 X10(3)/MCL (ref 0.6–4.6)
LYMPHOCYTES NFR BLD AUTO: 29.7 %
MCH RBC QN AUTO: 26 PG (ref 27–31)
MCHC RBC AUTO-ENTMCNC: 30.8 G/DL (ref 33–36)
MCV RBC AUTO: 84.3 FL (ref 80–94)
MONOCYTES # BLD AUTO: 0.49 X10(3)/MCL (ref 0.1–1.3)
MONOCYTES NFR BLD AUTO: 5.1 %
NEUTROPHILS # BLD AUTO: 5.83 X10(3)/MCL (ref 2.1–9.2)
NEUTROPHILS NFR BLD AUTO: 61.3 %
PLATELET # BLD AUTO: 309 X10(3)/MCL (ref 130–400)
PMV BLD AUTO: 9.9 FL (ref 7.4–10.4)
POCT GLUCOSE: 153 MG/DL (ref 70–110)
POCT GLUCOSE: 309 MG/DL (ref 70–110)
POCT GLUCOSE: 324 MG/DL (ref 70–110)
POCT GLUCOSE: 388 MG/DL (ref 70–110)
POTASSIUM SERPL-SCNC: 4.4 MMOL/L (ref 3.5–5.1)
PROT SERPL-MCNC: 6.5 GM/DL (ref 6.4–8.3)
RBC # BLD AUTO: 3.31 X10(6)/MCL (ref 4.7–6.1)
SODIUM SERPL-SCNC: 137 MMOL/L (ref 136–145)
WBC # BLD AUTO: 9.53 X10(3)/MCL (ref 4.5–11.5)

## 2025-07-15 PROCEDURE — 36415 COLL VENOUS BLD VENIPUNCTURE: CPT | Performed by: PHYSICIAN ASSISTANT

## 2025-07-15 PROCEDURE — 71000039 HC RECOVERY, EACH ADD'L HOUR: Performed by: SURGERY

## 2025-07-15 PROCEDURE — 25000003 PHARM REV CODE 250: Performed by: INTERNAL MEDICINE

## 2025-07-15 PROCEDURE — 88311 DECALCIFY TISSUE: CPT

## 2025-07-15 PROCEDURE — 71000033 HC RECOVERY, INTIAL HOUR: Performed by: SURGERY

## 2025-07-15 PROCEDURE — 36000706: Performed by: SURGERY

## 2025-07-15 PROCEDURE — 63600175 PHARM REV CODE 636 W HCPCS: Performed by: INTERNAL MEDICINE

## 2025-07-15 PROCEDURE — 36000707: Performed by: SURGERY

## 2025-07-15 PROCEDURE — 88304 TISSUE EXAM BY PATHOLOGIST: CPT | Performed by: SURGERY

## 2025-07-15 PROCEDURE — 63600175 PHARM REV CODE 636 W HCPCS: Performed by: NURSE ANESTHETIST, CERTIFIED REGISTERED

## 2025-07-15 PROCEDURE — 63600175 PHARM REV CODE 636 W HCPCS: Performed by: PHYSICIAN ASSISTANT

## 2025-07-15 PROCEDURE — 25000003 PHARM REV CODE 250: Performed by: PHYSICIAN ASSISTANT

## 2025-07-15 PROCEDURE — 0QB10ZZ EXCISION OF SACRUM, OPEN APPROACH: ICD-10-PCS | Performed by: SURGERY

## 2025-07-15 PROCEDURE — 11000004 HC SNF PRIVATE

## 2025-07-15 PROCEDURE — 85025 COMPLETE CBC W/AUTO DIFF WBC: CPT | Performed by: PHYSICIAN ASSISTANT

## 2025-07-15 PROCEDURE — 0QBS0ZZ EXCISION OF COCCYX, OPEN APPROACH: ICD-10-PCS | Performed by: SURGERY

## 2025-07-15 PROCEDURE — 25000003 PHARM REV CODE 250: Performed by: NURSE ANESTHETIST, CERTIFIED REGISTERED

## 2025-07-15 PROCEDURE — 27000207 HC ISOLATION

## 2025-07-15 PROCEDURE — 37000008 HC ANESTHESIA 1ST 15 MINUTES: Performed by: SURGERY

## 2025-07-15 PROCEDURE — 37000009 HC ANESTHESIA EA ADD 15 MINS: Performed by: SURGERY

## 2025-07-15 PROCEDURE — 80053 COMPREHEN METABOLIC PANEL: CPT | Performed by: PHYSICIAN ASSISTANT

## 2025-07-15 RX ORDER — LIDOCAINE HYDROCHLORIDE 10 MG/ML
INJECTION, SOLUTION EPIDURAL; INFILTRATION; INTRACAUDAL; PERINEURAL
Status: DISCONTINUED | OUTPATIENT
Start: 2025-07-15 | End: 2025-07-15

## 2025-07-15 RX ORDER — MORPHINE SULFATE 4 MG/ML
2 INJECTION, SOLUTION INTRAMUSCULAR; INTRAVENOUS EVERY 5 MIN PRN
Status: DISCONTINUED | OUTPATIENT
Start: 2025-07-15 | End: 2025-07-15 | Stop reason: HOSPADM

## 2025-07-15 RX ORDER — HYDROCODONE BITARTRATE AND ACETAMINOPHEN 5; 325 MG/1; MG/1
1 TABLET ORAL
Status: DISCONTINUED | OUTPATIENT
Start: 2025-07-15 | End: 2025-07-15 | Stop reason: HOSPADM

## 2025-07-15 RX ORDER — DIPHENHYDRAMINE HYDROCHLORIDE 50 MG/ML
12.5 INJECTION, SOLUTION INTRAMUSCULAR; INTRAVENOUS EVERY 4 HOURS
Status: DISCONTINUED | OUTPATIENT
Start: 2025-07-15 | End: 2025-07-15 | Stop reason: HOSPADM

## 2025-07-15 RX ORDER — ONDANSETRON HYDROCHLORIDE 2 MG/ML
INJECTION, SOLUTION INTRAVENOUS
Status: DISCONTINUED | OUTPATIENT
Start: 2025-07-15 | End: 2025-07-15

## 2025-07-15 RX ORDER — GLUCAGON 1 MG
1 KIT INJECTION
Status: DISCONTINUED | OUTPATIENT
Start: 2025-07-15 | End: 2025-07-15 | Stop reason: HOSPADM

## 2025-07-15 RX ORDER — IPRATROPIUM BROMIDE AND ALBUTEROL SULFATE 2.5; .5 MG/3ML; MG/3ML
3 SOLUTION RESPIRATORY (INHALATION) EVERY 4 HOURS
Status: DISCONTINUED | OUTPATIENT
Start: 2025-07-15 | End: 2025-07-15 | Stop reason: HOSPADM

## 2025-07-15 RX ORDER — SODIUM CHLORIDE 9 MG/ML
INJECTION, SOLUTION INTRAVENOUS CONTINUOUS
Status: DISCONTINUED | OUTPATIENT
Start: 2025-07-15 | End: 2025-07-15 | Stop reason: HOSPADM

## 2025-07-15 RX ORDER — MIDAZOLAM HYDROCHLORIDE 1 MG/ML
INJECTION INTRAMUSCULAR; INTRAVENOUS
Status: DISCONTINUED | OUTPATIENT
Start: 2025-07-15 | End: 2025-07-15

## 2025-07-15 RX ORDER — PROMETHAZINE HYDROCHLORIDE 25 MG/1
25 TABLET ORAL EVERY 6 HOURS PRN
Status: DISCONTINUED | OUTPATIENT
Start: 2025-07-15 | End: 2025-07-15 | Stop reason: HOSPADM

## 2025-07-15 RX ORDER — ESMOLOL HYDROCHLORIDE 10 MG/ML
INJECTION INTRAVENOUS
Status: DISCONTINUED | OUTPATIENT
Start: 2025-07-15 | End: 2025-07-15

## 2025-07-15 RX ORDER — DEXAMETHASONE SODIUM PHOSPHATE 4 MG/ML
INJECTION, SOLUTION INTRA-ARTICULAR; INTRALESIONAL; INTRAMUSCULAR; INTRAVENOUS; SOFT TISSUE
Status: DISCONTINUED | OUTPATIENT
Start: 2025-07-15 | End: 2025-07-15

## 2025-07-15 RX ORDER — ONDANSETRON HYDROCHLORIDE 2 MG/ML
4 INJECTION, SOLUTION INTRAVENOUS DAILY PRN
Status: DISCONTINUED | OUTPATIENT
Start: 2025-07-15 | End: 2025-07-15 | Stop reason: HOSPADM

## 2025-07-15 RX ORDER — ROCURONIUM BROMIDE 10 MG/ML
INJECTION, SOLUTION INTRAVENOUS
Status: DISCONTINUED | OUTPATIENT
Start: 2025-07-15 | End: 2025-07-15

## 2025-07-15 RX ORDER — HALOPERIDOL LACTATE 5 MG/ML
0.5 INJECTION, SOLUTION INTRAMUSCULAR EVERY 10 MIN PRN
Status: DISCONTINUED | OUTPATIENT
Start: 2025-07-15 | End: 2025-07-15 | Stop reason: HOSPADM

## 2025-07-15 RX ORDER — SODIUM CHLORIDE 9 MG/ML
INJECTION, SOLUTION INTRAVENOUS CONTINUOUS PRN
Status: DISCONTINUED | OUTPATIENT
Start: 2025-07-15 | End: 2025-07-15

## 2025-07-15 RX ORDER — EPHEDRINE SULFATE 50 MG/ML
INJECTION, SOLUTION INTRAVENOUS
Status: DISCONTINUED | OUTPATIENT
Start: 2025-07-15 | End: 2025-07-15

## 2025-07-15 RX ORDER — ONDANSETRON HYDROCHLORIDE 2 MG/ML
4 INJECTION, SOLUTION INTRAVENOUS EVERY 12 HOURS PRN
Status: DISCONTINUED | OUTPATIENT
Start: 2025-07-15 | End: 2025-07-15 | Stop reason: HOSPADM

## 2025-07-15 RX ORDER — PROPOFOL 10 MG/ML
VIAL (ML) INTRAVENOUS
Status: DISCONTINUED | OUTPATIENT
Start: 2025-07-15 | End: 2025-07-15

## 2025-07-15 RX ADMIN — EPHEDRINE SULFATE 12.5 MG: 50 INJECTION INTRAVENOUS at 11:07

## 2025-07-15 RX ADMIN — MORPHINE SULFATE 1 MG: 4 INJECTION, SOLUTION INTRAMUSCULAR; INTRAVENOUS at 12:07

## 2025-07-15 RX ADMIN — ENOXAPARIN SODIUM 40 MG: 40 INJECTION SUBCUTANEOUS at 05:07

## 2025-07-15 RX ADMIN — PROPOFOL 175 MG: 10 INJECTION, EMULSION INTRAVENOUS at 11:07

## 2025-07-15 RX ADMIN — INSULIN ASPART 8 UNITS: 100 INJECTION, SOLUTION INTRAVENOUS; SUBCUTANEOUS at 05:07

## 2025-07-15 RX ADMIN — MIDAZOLAM 2 MG: 1 INJECTION INTRAMUSCULAR; INTRAVENOUS at 11:07

## 2025-07-15 RX ADMIN — MEROPENEM 1 G: 1 INJECTION, POWDER, FOR SOLUTION INTRAVENOUS at 08:07

## 2025-07-15 RX ADMIN — LIDOCAINE HYDROCHLORIDE 50 MG: 10 INJECTION, SOLUTION EPIDURAL; INFILTRATION; INTRACAUDAL; PERINEURAL at 11:07

## 2025-07-15 RX ADMIN — ROCURONIUM BROMIDE 20 MG: 10 SOLUTION INTRAVENOUS at 11:07

## 2025-07-15 RX ADMIN — DAPTOMYCIN 600 MG: 500 INJECTION, POWDER, LYOPHILIZED, FOR SOLUTION INTRAVENOUS at 03:07

## 2025-07-15 RX ADMIN — ROCURONIUM BROMIDE 10 MG: 10 SOLUTION INTRAVENOUS at 11:07

## 2025-07-15 RX ADMIN — MEROPENEM 1 G: 1 INJECTION, POWDER, FOR SOLUTION INTRAVENOUS at 02:07

## 2025-07-15 RX ADMIN — Medication 1 CAPSULE: at 02:07

## 2025-07-15 RX ADMIN — MEROPENEM 1 G: 1 INJECTION, POWDER, FOR SOLUTION INTRAVENOUS at 04:07

## 2025-07-15 RX ADMIN — SUGAMMADEX 200 MG: 100 INJECTION, SOLUTION INTRAVENOUS at 11:07

## 2025-07-15 RX ADMIN — ESMOLOL HYDROCHLORIDE 50 MG: 10 INJECTION, SOLUTION INTRAVENOUS at 11:07

## 2025-07-15 RX ADMIN — INSULIN GLARGINE 60 UNITS: 100 INJECTION, SOLUTION SUBCUTANEOUS at 08:07

## 2025-07-15 RX ADMIN — INSULIN ASPART 3 UNITS: 100 INJECTION, SOLUTION INTRAVENOUS; SUBCUTANEOUS at 08:07

## 2025-07-15 RX ADMIN — DILTIAZEM HYDROCHLORIDE 120 MG: 120 CAPSULE, COATED, EXTENDED RELEASE ORAL at 01:07

## 2025-07-15 RX ADMIN — DEXAMETHASONE SODIUM PHOSPHATE 4 MG: 4 INJECTION, SOLUTION INTRA-ARTICULAR; INTRALESIONAL; INTRAMUSCULAR; INTRAVENOUS; SOFT TISSUE at 11:07

## 2025-07-15 RX ADMIN — ONDANSETRON 4 MG: 2 INJECTION INTRAMUSCULAR; INTRAVENOUS at 11:07

## 2025-07-15 RX ADMIN — Medication 1 CAPSULE: at 05:07

## 2025-07-15 RX ADMIN — SODIUM CHLORIDE, POTASSIUM CHLORIDE, SODIUM LACTATE AND CALCIUM CHLORIDE: 600; 310; 30; 20 INJECTION, SOLUTION INTRAVENOUS at 11:07

## 2025-07-15 NOTE — ANESTHESIA PROCEDURE NOTES
Intubation    Date/Time: 7/15/2025 11:33 AM    Performed by: Curtis Kam CRNA  Authorized by: Cutris Kam CRNA    Intubation:     Induction:  Intravenous    Intubated:  Postinduction    Mask Ventilation:  Easy mask    Attempts:  1    Attempted By:  CRNA    Method of Intubation:  Direct    Blade:  Glidescope 4    Laryngeal View Grade: Grade I - full view of cords      Difficult Airway Encountered?: No      Complications:  None    Airway Device:  Oral endotracheal tube    Airway Device Size:  7.5    Style/Cuff Inflation:  Cuffed    Inflation Amount (mL):  7    Tube secured:  22    Secured at:  The lips    Placement Verified By:  Capnometry    Complicating Factors:  None    Findings Post-Intubation:  BS equal bilateral  Notes:      Cuff pressure adjusted to 25 cm H2O per manometry

## 2025-07-15 NOTE — ANESTHESIA PREPROCEDURE EVALUATION
07/15/2025  Antonio Galvan II is a 59 y.o., male.  Diagnosis:      Pressure ulcer of left hip, stage 4 [L89.224]      Pressure ulcer of sacral region, stage 4 [L89.154]      Pressure injury of right ischium, stage 4 [L89.314]      Open wound of left hip, initial encounter [S71.002A]   Pre-op diagnosis:      Pressure ulcer of left hip, stage 4 [L89.224]      Pressure ulcer of sacral region, stage 4 [L89.154]      Pressure injury of right ischium, stage 4 [L89.314]      Open wound of left hip, initial encounter [S71.002A]   [facility]   Surgeons: Arnie Dixon Jr., MD         The pt. comes to [facility] or the noted procedure under GETA.  Case: [id number] Date/Time: 07/08/25 0715   Procedure: DEBRIDEMENT, PRESSURE ULCER (Bilateral) - sacral, buttocks, bilateral ischial pressure ulcers   Anesthesia type: GETA     PMHx:  No specialty history recorded    Other Medical History   Chronic skin ulcer DM (diabetes mellitus)   Infected decubitus ulcer Lymphedema of leg   Neurogenic bladder Obesity, unspecified   Pressure ulcer of heel Pressure ulcer of right ischium   Quadriplegia Quadriplegic spinal paralysis   Pressure ulcer of right foot, stage 3 A-fib   Cardiac arrest      Problem List  Current as of 07/08/25 0520  Abnormal urinalysis Acute deep vein thrombosis (DVT) of brachial vein of right upper extremity   Atrial flutter Chronic blood loss anemia   Complex sleep apnea syndrome Constipation   Decubitus ulcer, infected Intolerance of continuous positive airway pressure (CPAP) ventilation   Obstructive sleep apnea syndrome Open wound of left hip   Osteomyelitis Pressure injury of right ischium, stage 4   Pressure ulcer of left hip, stage 4 Pressure ulcer of sacral region, stage 4   Quadriplegia Uncontrolled type 2 diabetes mellitus with hyperglycemia         PSHx:  Surgical History:  DEBRIDEMENT  "OF BUTTOCKS APPLICATION OF WOUND VACUUM-ASSISTED CLOSURE DEVICE   INCISION AND DRAINAGE HIP PRESSURE ULCER DEBRIDEMENT         Vital signs:  Pre Vitals  Current as of 07/08/25 0559  BP: Pulse:   Resp: 20 SpO2:   Temp:   Height: 5' 8" (1.727 m) (07/07/25) Weight: 90.7 kg (199 lb 15.3 oz) (07/06/25)   BMI: 30.40 IBW: 68.4 kg (150 lb 12.1 oz)   Last edited 07/07/25 2344 by NN      Lab Data:   Latest Reference Range & Units Most Recent 07/06/25 04:21 07/07/25 03:44   WBC 4.50 - 11.50 x10(3)/mcL 12.93 (H)  7/7/25 03:44 13.41 (H) 12.93 (H)   RBC 4.70 - 6.10 x10(6)/mcL 2.93 (L)  7/7/25 03:44 2.54 (L) 2.93 (L)   Hemoglobin 14.0 - 18.0 g/dL 7.1 (L)  7/7/25 03:44 6.3 (L) 7.1 (L)   Hematocrit 42.0 - 52.0 % 23.1 (L)  7/7/25 03:44 20.1 (L) 23.1 (L)   MCV 80.0 - 94.0 fL 78.8 (L)  7/7/25 03:44 79.1 (L) 78.8 (L)   MCH 27.0 - 31.0 pg 24.2 (L)  7/7/25 03:44 24.8 (L) 24.2 (L)   MCHC 33.0 - 36.0 g/dL 30.7 (L)  7/7/25 03:44 31.3 (L) 30.7 (L)   RDW 11.5 - 17.0 % 16.5  7/7/25 03:44 16.4 16.5   Platelet Count 130 - 400 x10(3)/mcL 343  7/7/25 03:44 327 343   MPV 7.4 - 10.4 fL 10.8 (H)  7/7/25 03:44 11.4 (H) 10.8 (H)   Platelet Estimate Normal, Adequate  Normal  3/8/24 05:28     Gran % 38.0 - 73.0 % 57.7  7/22/22 04:55     Neutrophils Relative 47 - 80 % 68  5/12/23 03:18     Neut % % 78.1  7/7/25 03:44 80.3 78.1   Lymph % 18.0 - 48.0 % 29.7  7/22/22 04:55     LYMPH % % 12.8  7/7/25 03:44 13.5 12.8   Lymphocyte % 13 - 40 % 26  5/12/23 03:18     Mono % % 6.5  7/7/25 03:44 4.6 6.5   Eos % % 1.5  7/7/25 03:44 0.7 1.5   Basophil % % 0.4  7/7/25 03:44 0.3 0.4   Immature Granulocytes % 0.7  7/7/25 03:44 0.6 0.7   Gran # (ANC) 1.8 - 7.7 K/uL 3.6  7/22/22 04:55     Neutrophils Abs Calc 2.1 - 9.2 x10(3)/mcL 5.7392  5/12/23 03:18     Neut # 2.1 - 9.2 x10(3)/mcL 10.10 (H)  7/7/25 03:44 10.76 (H) 10.10 (H)   Lymph # 0.6 - 4.6 x10(3)/mcL 1.65  7/7/25 03:44 1.81 1.65   Mono # 0.1 - 1.3 x10(3)/mcL 0.84  7/7/25 03:44 0.62 0.84   Eos # 0 - 0.9 x10(3)/mcL " 0.20  7/7/25 03:44 0.10 0.20   Baso # <=0.2 x10(3)/mcL 0.05  7/7/25 03:44 0.04 0.05   Immature Grans (Abs) 0.00 - 0.04 x10(3)/mcL 0.09 (H)  7/7/25 03:44 0.08 (H) 0.09 (H)   nRBC % 0.0  7/7/25 03:44 0.0 0.0   Differential Method  Automated  7/22/22 04:55     Macrocytosis (none)  1+ !  5/12/23 03:18     Microcytosis (none)  2+ !  5/15/23 03:18     Aniso (none)  2+ !  5/30/23 16:57     Poikilocytosis (none)  2+ !  5/12/23 03:18     Poly (none)  1+ !  2/10/23 14:57     Hypo (none)  1+ !  5/30/23 16:57     Elliptocytosis (none)  Slight !  5/12/23 03:18     RBC Morph Normal  Normal  3/8/24 05:28     Spherocytes (none)  1+ !  5/12/23 03:18     Stomatocytes (none)  1+ !  5/12/23 03:18     Target Cells (none)  1+ !  5/12/23 03:18     Peripheral Smear Evaluation  - Normocytic, normochromic anemia.  - Thrombocytopenia with occasional small platelet clumps.    Impression: Normocytic anemia can be seen in early iron deficiency anemia, anemia of chronic disease and acute blood loss. Thrombocytopenia can be due to artifactual platelet clumping, decreased production, increased destruction (immune and non-immune mediated) or due to splenic sequestration. Recommend repeat CBC in a blue top (citrate) tube to eliminate the possibility of pseudothrombocytopenia due to platelet clumping.    Maldonado Ariza M.D.     3/21/24 10:41     (H): Data is abnormally high  (L): Data is abnormally low  !: Data is abnormal     Latest Reference Range & Units Most Recent 07/07/25 03:44 07/07/25 12:45   Sodium 136 - 145 mmol/L 135 (L)  7/7/25 03:44 135 (L)    Potassium 3.5 - 5.1 mmol/L 3.9  7/7/25 03:44 3.9    Potassium 3.5 - 5.1 mmol/L 4.1 (E)  12/13/22 06:26     Chloride 98 - 107 mmol/L 105  7/7/25 03:44 105    CO2 22 - 29 mmol/L 21 (L)  7/7/25 03:44 21 (L)    Anion Gap mEq/L 9.0  7/7/25 03:44 9.0    BUN 8.4 - 25.7 mg/dL 39.8 (H)  7/7/25 03:44 39.8 (H)    Creatinine 0.72 - 1.25 mg/dL 1.92 (H)  7/7/25 03:44 1.92 (H)    BUN/CREAT RATIO  21  7/7/25  03:44 21    eGFR mL/min/1.73/m2 40  7/7/25 03:44 40    eGFR if non African American >60 mL/min/1.73 m^2 >60.0  7/22/22 04:55     eGFR if African American mL/min/1.73mSq 112 (E)  12/13/22 06:26     Glucose 74 - 100 mg/dL 367 (H)  7/7/25 03:44 367 (H)    Calcium 8.4 - 10.2 mg/dL 7.8 (L)  7/7/25 03:44 7.8 (L)    Phosphorus Level 2.3 - 4.7 mg/dL 3.2  10/10/24 06:27     Magnesium  1.60 - 2.60 mg/dL 2.00  10/10/24 06:27     ALP 40 - 150 unit/L 97  7/7/25 03:44 97    PROTEIN TOTAL 6.4 - 8.3 gm/dL 6.2 (L)  7/7/25 03:44 6.2 (L)    Albumin 3.5 - 5.0 g/dL 1.6 (L)  7/7/25 03:44 1.6 (L)    Albumin/Globulin Ratio 1.1 - 2.0 ratio 0.3 (L)  7/7/25 03:44 0.3 (L)    Prealbumin 18.0 - 45.0 mg/dL 5.9 (L)  7/7/25 12:45  5.9 (L)   BILIRUBIN TOTAL <=1.5 mg/dL 0.2  7/7/25 03:44 0.2    Bilirubin Direct 0.0 - 0.5  0.2  4/19/22 12:58     Bilirubin, Indirect 0.00 - 0.80  0.30  4/19/22 12:58     AST 11 - 45 unit/L 6 (L)  7/7/25 03:44 6 (L)    ALT 0 - 55 unit/L 6  7/7/25 03:44 6    CRP <5.00 mg/L 81.00 (H)  12/19/24 08:30     Globulin, Total 2.4 - 3.5 gm/dL 4.6 (H)  7/7/25 03:44 4.6 (H)    (L): Data is abnormally low  (H): Data is abnormally high  (E): External lab result    Echo:  EF:60%  No valve dysfunction reported.    EKG:  EKG 12-lead  Order: [id number]   Status: Final result       Next appt: None       Dx: Screening for cardiovascular condition    Test Result Released: Yes (not seen)    0 Result Notes      Component  Ref Range & Units (hover) 3 d ago   QRS Duration 108   OHS QTC Calculation 473   Resulting Agency [facility]              Narrative  Performed by: [facility]  Test Reason : Z13.6,    Vent. Rate :  72 BPM     Atrial Rate :  72 BPM     P-R Int : 128 ms          QRS Dur : 108 ms      QT Int : 432 ms       P-R-T Axes :  25  81  30 degrees    QTcB Int : 473 ms    Normal sinus rhythm  Incomplete right bundle branch block  Borderline Abnormal ECG  Confirmed by Michael Corley ([id number]) on 7/5/2025 12:47:41 PM    Referred By:            Confirmed  By: Michael Corley   Specimen Collected: 07/05/25 12:17 CDT Last Resulted: 07/05/25 12:47 CDT                             Pre-op Assessment    I have reviewed the Patient Summary Reports.     I have reviewed the Nursing Notes. I have reviewed the NPO Status.   I have reviewed the Medications.     Review of Systems  Anesthesia Hx:  No problems with previous Anesthesia   History of prior surgery of interest to airway management or planning: tracheostomy, cervical fusion. Previous anesthesia: General           Social:  Non-Smoker       Hematology/Oncology:  Hematology Normal   Oncology Normal                                   EENT/Dental:  EENT/Dental Normal           Cardiovascular:  Cardiovascular Normal Exercise tolerance: good                     Functional Capacity good / => 4 METS                         Pulmonary:        Sleep Apnea     Obstructive Sleep Apnea (LIZBETH).           Renal/:  Renal/ Normal                 Hepatic/GI:  Hepatic/GI Normal                    Musculoskeletal:  Musculoskeletal Normal                Neurological:                    Spinal Cord Injury, Spinal Cord Injury-Chronic  quadripalegic        Endocrine:  Diabetes    Diabetes                      Dermatological:  Skin Normal    Psych:  Psychiatric Normal                    Physical Exam  General: Alert, Oriented, Well nourished and Cooperative    Airway:  Mallampati: II   Mouth Opening: Normal  TM Distance: Normal  Tongue: Normal  Neck ROM: Normal ROM    Dental:  Intact    Chest/Lungs:  Clear to auscultation, Normal Respiratory Rate    Heart:  Rate: Normal  Rhythm: Regular Rhythm        Anesthesia Plan  Type of Anesthesia, risks & benefits discussed:    Anesthesia Type: Gen ETT  Intra-op Monitoring Plan: Standard ASA Monitors  Post Op Pain Control Plan: IV/PO Opioids PRN  Induction:  IV and Inhalation  Airway Plan: Direct  Informed Consent: Informed consent signed with the Patient and all parties understand the risks and agree with  anesthesia plan.  All questions answered. Patient consented to blood products? Yes  ASA Score: 3  Day of Surgery Review of History & Physical: H&P Update referred to the surgeon/provider.    Ready For Surgery From Anesthesia Perspective.     .

## 2025-07-15 NOTE — OP NOTE
[facility] - Periop Services  Operative Note      Date of Procedure: 7/15/2025     Procedure: Procedure(s) (LRB):  DEBRIDEMENT, WOUND (N/A)     Surgeons and Role:     * Keyonna Jean MD - Primary    Assisting Surgeon: None    Pre-Operative Diagnosis: Infected decubitus ulcer [L89.90, L08.9]    Post-Operative Diagnosis: Post-Op Diagnosis Codes:     * Infected decubitus ulcer [L89.90, L08.9]    Anesthesia: General    Operative Findings (including complications, if any):  Large stage IV sacral decubitus involving the coccyx    Description of Technical Procedures:  After obtaining consent patient taken to the operating room general anesthesia induced without difficulty he is then placed in the right lateral decubitus position previous the placed dressings were removed the ulcer in question was the large sacral ulcer which had necrotic skin subcutaneous tissue muscle as well as exposed coccyx.  Area was prepped and draped in a fashion scalpel and cautery were used to excise nonviable skin subcutaneous tissue and muscle down the viable muscle throughout the bulk of the wound and a [place] was used to remove the exposed coccyx elements bone fragments were sent for culture and pathology.  Post debridement measurements were 13 x 8 x 6 cm I used the Mesalt packing as well as saline so clean role in order to fill the cavity and then applied ABDs and paper tape patient tolerated procedure well and was then transferred back to the swing bed for ongoing care.  Of note hemostasis had been obtained with electrocautery and the defect was also irrigated with warm normal saline prior to applying the dressing    Significant Surgical Tasks Conducted by the Assistant(s), if Applicable:  Not applicable    Estimated Blood Loss (EBL): * No values recorded between 7/15/2025 11:26 AM and 7/15/2025 11:57 AM *           Implants: * No implants in log *    Specimens:   Specimen (24h ago, onward)      Orders from this encounter        Start     Ordered    07/15/25 1152  Specimen to Pathology General Surgery  Once        References:    Click here for ordering Quick Tip   Question Answer Comment   Service Line: General Surgery    Specimen Source Coccyx    Specimen Class: Routine/Screening    Procedure Type: Biopsy    Clinical Information: coccyx bone    Release to patient Immediate        07/15/25 1153            Orders from suspended admission ([facility] - Nilton Ortega MD)       None                   ID Type Source Tests Collected by Time Destination   A : Coccyx bone Tissue Coccyx SPECIMEN TO PATHOLOGY Keyonna Jean MD 7/15/2025 1154               Condition: Good    Disposition: PACU - hemodynamically stable.    Attestation: I performed the procedure.    Discharge Note    OUTCOME: Patient tolerated treatment/procedure well without complication and is now ready for discharge.    DISPOSITION: Skilled Nursing Facility    FINAL DIAGNOSIS:  <principal problem not specified>    FOLLOWUP: With primary care provider    DISCHARGE INSTRUCTIONS:  No discharge procedures on file.

## 2025-07-15 NOTE — OP NOTE
[facility] - Periop Services  Brief Operative Note    Surgery Date: 7/15/2025     Surgeons and Role:     * Keyonna Jean MD - Primary    Assisting Surgeon: None    Pre-op Diagnosis:  Infected decubitus ulcer [L89.90, L08.9]    Post-op Diagnosis:  Post-Op Diagnosis Codes:     * Infected decubitus ulcer [L89.90, L08.9]    Procedure(s) (LRB):  DEBRIDEMENT, WOUND (N/A)    Anesthesia: General    Operative Findings:  Patient was noted to have a necrotic skin subcutaneous tissue muscle and coccyx bone present within this large sacral decubitus.  There was no evidence of any abscess formation and no evidence of any infectious complications at this point.    Estimated Blood Loss: * No values recorded between 7/15/2025 11:26 AM and 7/15/2025 11:56 AM *         Specimens:   Specimen (24h ago, onward)      Orders from this encounter       Start     Ordered    07/15/25 1152  Specimen to Pathology General Surgery  Once        References:    Click here for ordering Quick Tip   Question Answer Comment   Service Line: General Surgery    Specimen Source Coccyx    Specimen Class: Routine/Screening    Procedure Type: Biopsy    Clinical Information: coccyx bone    Release to patient Immediate        07/15/25 1153            Orders from suspended admission ([facility] - Nilton Ortega MD)       None                    ID Type Source Tests Collected by Time Destination   A : Coccyx bone Tissue Coccyx SPECIMEN TO PATHOLOGY Keyonna Jean MD 7/15/2025 1154            Discharge Note    OUTCOME: Patient tolerated treatment/procedure well without complication and is now ready for discharge.    DISPOSITION: Skilled Nursing Facility    FINAL DIAGNOSIS:  <principal problem not specified>    FOLLOWUP: With primary care provider    DISCHARGE INSTRUCTIONS:  No discharge procedures on file.

## 2025-07-15 NOTE — TRANSFER OF CARE
Anesthesia Transfer of Care Note    Patient: Antonio Galvan II    Procedure(s) Performed: Procedure(s) (LRB):  DEBRIDEMENT, WOUND (N/A)    Patient location: PACU    Anesthesia Type: general    Transport from OR: Transported from OR on room air with adequate spontaneous ventilation    Post pain: adequate analgesia    Post assessment: no apparent anesthetic complications    Post vital signs: stable    Level of consciousness: responds to stimulation    Nausea/Vomiting: no nausea/vomiting    Complications: none    Transfer of care protocol was followedComments: HR 81  /79  SpO2 100%  RR 10  T 36.8      Last vitals: There were no vitals taken for this visit.

## 2025-07-15 NOTE — ANESTHESIA POSTPROCEDURE EVALUATION
Anesthesia Post Evaluation    Patient: Antonio Galvan II    Procedure(s) Performed: Procedure(s) (LRB):  DEBRIDEMENT, WOUND (N/A)    Final Anesthesia Type: general      Patient location during evaluation: PACU  Patient participation: Yes- Able to Participate  Level of consciousness: responds to stimulation  Post-procedure vital signs: reviewed and stable  Pain management: adequate  Airway patency: patent  LIZBETH mitigation strategies: Extubation while patient is awake  PONV status at discharge: No PONV  Anesthetic complications: no      Cardiovascular status: hemodynamically stable and blood pressure returned to baseline  Respiratory status: unassisted, spontaneous ventilation and room air  Hydration status: euvolemic  Follow-up not needed.              Vitals Value Taken Time   /73 07/15/25 12:56   Temp 36.8 °C (98.2 °F) 07/15/25 12:07   Pulse 76 07/15/25 12:56   Resp 15 07/15/25 12:56   SpO2 97 % 07/15/25 12:56   Vitals shown include unfiled device data.      No case tracking events are documented in the log.      Pain/Allison Score: Pain Rating Prior to Med Admin: 5 (7/15/2025 12:39 PM)  Pain Rating Post Med Admin: 0 (7/14/2025 10:33 AM)  Allison Score: 7 (7/15/2025 12:29 PM)

## 2025-07-16 LAB
ALBUMIN SERPL-MCNC: 2.1 G/DL (ref 3.5–5)
ALBUMIN/GLOB SERPL: 0.5 RATIO (ref 1.1–2)
ALP SERPL-CCNC: 93 UNIT/L (ref 40–150)
ALT SERPL-CCNC: 19 UNIT/L (ref 0–55)
ANION GAP SERPL CALC-SCNC: 5 MEQ/L
AST SERPL-CCNC: 11 UNIT/L (ref 11–45)
BASOPHILS # BLD AUTO: 0.02 X10(3)/MCL
BASOPHILS NFR BLD AUTO: 0.3 %
BILIRUB SERPL-MCNC: 0.2 MG/DL
BUN SERPL-MCNC: 55.9 MG/DL (ref 8.4–25.7)
CALCIUM SERPL-MCNC: 8.4 MG/DL (ref 8.4–10.2)
CHLORIDE SERPL-SCNC: 104 MMOL/L (ref 98–107)
CO2 SERPL-SCNC: 27 MMOL/L (ref 22–29)
CREAT SERPL-MCNC: 0.77 MG/DL (ref 0.72–1.25)
CREAT/UREA NIT SERPL: 73
EOSINOPHIL # BLD AUTO: 0 X10(3)/MCL (ref 0–0.9)
EOSINOPHIL NFR BLD AUTO: 0 %
ERYTHROCYTE [DISTWIDTH] IN BLOOD BY AUTOMATED COUNT: 17.1 % (ref 11.5–17)
GFR SERPLBLD CREATININE-BSD FMLA CKD-EPI: >60 ML/MIN/1.73/M2
GLOBULIN SER-MCNC: 4.3 GM/DL (ref 2.4–3.5)
GLUCOSE SERPL-MCNC: 318 MG/DL (ref 74–100)
HCT VFR BLD AUTO: 28.3 % (ref 42–52)
HGB BLD-MCNC: 8.8 G/DL (ref 14–18)
IMM GRANULOCYTES # BLD AUTO: 0.06 X10(3)/MCL (ref 0–0.04)
IMM GRANULOCYTES NFR BLD AUTO: 0.8 %
LYMPHOCYTES # BLD AUTO: 0.81 X10(3)/MCL (ref 0.6–4.6)
LYMPHOCYTES NFR BLD AUTO: 10.7 %
MCH RBC QN AUTO: 25.8 PG (ref 27–31)
MCHC RBC AUTO-ENTMCNC: 31.1 G/DL (ref 33–36)
MCV RBC AUTO: 83 FL (ref 80–94)
MONOCYTES # BLD AUTO: 0.24 X10(3)/MCL (ref 0.1–1.3)
MONOCYTES NFR BLD AUTO: 3.2 %
NEUTROPHILS # BLD AUTO: 6.43 X10(3)/MCL (ref 2.1–9.2)
NEUTROPHILS NFR BLD AUTO: 85 %
PLATELET # BLD AUTO: 312 X10(3)/MCL (ref 130–400)
PMV BLD AUTO: 9.9 FL (ref 7.4–10.4)
POCT GLUCOSE: 151 MG/DL (ref 70–110)
POTASSIUM SERPL-SCNC: 5.2 MMOL/L (ref 3.5–5.1)
PROT SERPL-MCNC: 6.4 GM/DL (ref 6.4–8.3)
RBC # BLD AUTO: 3.41 X10(6)/MCL (ref 4.7–6.1)
SODIUM SERPL-SCNC: 136 MMOL/L (ref 136–145)
WBC # BLD AUTO: 7.56 X10(3)/MCL (ref 4.5–11.5)

## 2025-07-16 PROCEDURE — 63600175 PHARM REV CODE 636 W HCPCS: Performed by: INTERNAL MEDICINE

## 2025-07-16 PROCEDURE — 63600175 PHARM REV CODE 636 W HCPCS: Performed by: PHYSICIAN ASSISTANT

## 2025-07-16 PROCEDURE — 25000003 PHARM REV CODE 250: Performed by: FAMILY MEDICINE

## 2025-07-16 PROCEDURE — 25000003 PHARM REV CODE 250: Performed by: INTERNAL MEDICINE

## 2025-07-16 PROCEDURE — 27000207 HC ISOLATION

## 2025-07-16 PROCEDURE — 11000004 HC SNF PRIVATE

## 2025-07-16 PROCEDURE — 25000003 PHARM REV CODE 250: Performed by: PHYSICIAN ASSISTANT

## 2025-07-16 PROCEDURE — 80053 COMPREHEN METABOLIC PANEL: CPT | Performed by: PHYSICIAN ASSISTANT

## 2025-07-16 PROCEDURE — 97161 PT EVAL LOW COMPLEX 20 MIN: CPT

## 2025-07-16 PROCEDURE — 85025 COMPLETE CBC W/AUTO DIFF WBC: CPT | Performed by: PHYSICIAN ASSISTANT

## 2025-07-16 PROCEDURE — 36415 COLL VENOUS BLD VENIPUNCTURE: CPT | Performed by: PHYSICIAN ASSISTANT

## 2025-07-16 RX ADMIN — Medication 1 CAPSULE: at 09:07

## 2025-07-16 RX ADMIN — MEROPENEM 1 G: 1 INJECTION, POWDER, FOR SOLUTION INTRAVENOUS at 12:07

## 2025-07-16 RX ADMIN — OXYCODONE HYDROCHLORIDE AND ACETAMINOPHEN 500 MG: 500 TABLET ORAL at 09:07

## 2025-07-16 RX ADMIN — TRAMADOL HYDROCHLORIDE 50 MG: 50 TABLET, COATED ORAL at 04:07

## 2025-07-16 RX ADMIN — INSULIN ASPART 5 UNITS: 100 INJECTION, SOLUTION INTRAVENOUS; SUBCUTANEOUS at 10:07

## 2025-07-16 RX ADMIN — INSULIN GLARGINE 60 UNITS: 100 INJECTION, SOLUTION SUBCUTANEOUS at 09:07

## 2025-07-16 RX ADMIN — DAPTOMYCIN 600 MG: 500 INJECTION, POWDER, LYOPHILIZED, FOR SOLUTION INTRAVENOUS at 03:07

## 2025-07-16 RX ADMIN — SITAGLIPTIN 50 MG: 50 TABLET, FILM COATED ORAL at 09:07

## 2025-07-16 RX ADMIN — INSULIN ASPART 10 UNITS: 100 INJECTION, SOLUTION INTRAVENOUS; SUBCUTANEOUS at 02:07

## 2025-07-16 RX ADMIN — ZINC SULFATE 220 MG (50 MG) CAPSULE 220 MG: CAPSULE at 09:07

## 2025-07-16 RX ADMIN — Medication 1 CAPSULE: at 05:07

## 2025-07-16 RX ADMIN — ENOXAPARIN SODIUM 40 MG: 40 INJECTION SUBCUTANEOUS at 05:07

## 2025-07-16 RX ADMIN — INSULIN ASPART 8 UNITS: 100 INJECTION, SOLUTION INTRAVENOUS; SUBCUTANEOUS at 05:07

## 2025-07-16 RX ADMIN — OXYBUTYNIN CHLORIDE 10 MG: 5 TABLET, EXTENDED RELEASE ORAL at 09:07

## 2025-07-16 RX ADMIN — MEROPENEM 1 G: 1 INJECTION, POWDER, FOR SOLUTION INTRAVENOUS at 04:07

## 2025-07-16 RX ADMIN — INSULIN ASPART 4 UNITS: 100 INJECTION, SOLUTION INTRAVENOUS; SUBCUTANEOUS at 06:07

## 2025-07-16 RX ADMIN — Medication 1 CAPSULE: at 12:07

## 2025-07-16 RX ADMIN — DILTIAZEM HYDROCHLORIDE 120 MG: 120 CAPSULE, COATED, EXTENDED RELEASE ORAL at 09:07

## 2025-07-16 RX ADMIN — MEROPENEM 1 G: 1 INJECTION, POWDER, FOR SOLUTION INTRAVENOUS at 09:07

## 2025-07-17 LAB
GLUCOSE SERPL-MCNC: 201 MG/DL (ref 70–110)
GLUCOSE SERPL-MCNC: 293 MG/DL (ref 70–110)

## 2025-07-17 PROCEDURE — 11000004 HC SNF PRIVATE

## 2025-07-17 PROCEDURE — 25000003 PHARM REV CODE 250: Performed by: PHYSICIAN ASSISTANT

## 2025-07-17 PROCEDURE — 63600175 PHARM REV CODE 636 W HCPCS: Performed by: PHYSICIAN ASSISTANT

## 2025-07-17 PROCEDURE — 27000207 HC ISOLATION

## 2025-07-17 PROCEDURE — 63600175 PHARM REV CODE 636 W HCPCS: Performed by: INTERNAL MEDICINE

## 2025-07-17 PROCEDURE — 25000003 PHARM REV CODE 250: Performed by: FAMILY MEDICINE

## 2025-07-17 PROCEDURE — 25000003 PHARM REV CODE 250: Performed by: INTERNAL MEDICINE

## 2025-07-17 RX ADMIN — OXYBUTYNIN CHLORIDE 10 MG: 5 TABLET, EXTENDED RELEASE ORAL at 09:07

## 2025-07-17 RX ADMIN — INSULIN ASPART 6 UNITS: 100 INJECTION, SOLUTION INTRAVENOUS; SUBCUTANEOUS at 06:07

## 2025-07-17 RX ADMIN — INSULIN GLARGINE 60 UNITS: 100 INJECTION, SOLUTION SUBCUTANEOUS at 09:07

## 2025-07-17 RX ADMIN — MEROPENEM 1 G: 1 INJECTION, POWDER, FOR SOLUTION INTRAVENOUS at 01:07

## 2025-07-17 RX ADMIN — ENOXAPARIN SODIUM 40 MG: 40 INJECTION SUBCUTANEOUS at 05:07

## 2025-07-17 RX ADMIN — TRAMADOL HYDROCHLORIDE 50 MG: 50 TABLET, COATED ORAL at 03:07

## 2025-07-17 RX ADMIN — OXYCODONE HYDROCHLORIDE AND ACETAMINOPHEN 500 MG: 500 TABLET ORAL at 09:07

## 2025-07-17 RX ADMIN — SITAGLIPTIN 50 MG: 50 TABLET, FILM COATED ORAL at 09:07

## 2025-07-17 RX ADMIN — ZINC SULFATE 220 MG (50 MG) CAPSULE 220 MG: CAPSULE at 09:07

## 2025-07-17 RX ADMIN — MEROPENEM 1 G: 1 INJECTION, POWDER, FOR SOLUTION INTRAVENOUS at 06:07

## 2025-07-17 RX ADMIN — INSULIN ASPART 2 UNITS: 100 INJECTION, SOLUTION INTRAVENOUS; SUBCUTANEOUS at 09:07

## 2025-07-17 RX ADMIN — DAPTOMYCIN 600 MG: 500 INJECTION, POWDER, LYOPHILIZED, FOR SOLUTION INTRAVENOUS at 03:07

## 2025-07-17 RX ADMIN — INSULIN ASPART 2 UNITS: 100 INJECTION, SOLUTION INTRAVENOUS; SUBCUTANEOUS at 06:07

## 2025-07-17 RX ADMIN — Medication 1 CAPSULE: at 11:07

## 2025-07-17 RX ADMIN — DILTIAZEM HYDROCHLORIDE 120 MG: 120 CAPSULE, COATED, EXTENDED RELEASE ORAL at 09:07

## 2025-07-17 RX ADMIN — Medication 1 CAPSULE: at 09:07

## 2025-07-17 RX ADMIN — Medication 1 CAPSULE: at 05:07

## 2025-07-17 RX ADMIN — MEROPENEM 1 G: 1 INJECTION, POWDER, FOR SOLUTION INTRAVENOUS at 09:07

## 2025-07-17 NOTE — H&P
H&P completed on 7 10 25 has been reviewed, the patient has been examined and:  I concur with the findings and no changes have occurred since H&P was written.    There are no hospital problems to display for this patient.

## 2025-07-18 LAB
GLUCOSE SERPL-MCNC: 178 MG/DL (ref 70–110)
GLUCOSE SERPL-MCNC: 235 MG/DL (ref 70–110)
PSYCHE PATHOLOGY RESULT: NORMAL

## 2025-07-18 PROCEDURE — 63600175 PHARM REV CODE 636 W HCPCS: Performed by: INTERNAL MEDICINE

## 2025-07-18 PROCEDURE — 27000207 HC ISOLATION

## 2025-07-18 PROCEDURE — A4216 STERILE WATER/SALINE, 10 ML: HCPCS | Performed by: INTERNAL MEDICINE

## 2025-07-18 PROCEDURE — 11000004 HC SNF PRIVATE

## 2025-07-18 PROCEDURE — 25000003 PHARM REV CODE 250: Performed by: FAMILY MEDICINE

## 2025-07-18 PROCEDURE — 63600175 PHARM REV CODE 636 W HCPCS: Performed by: PHYSICIAN ASSISTANT

## 2025-07-18 PROCEDURE — 25000003 PHARM REV CODE 250: Performed by: INTERNAL MEDICINE

## 2025-07-18 PROCEDURE — 25000003 PHARM REV CODE 250: Performed by: PHYSICIAN ASSISTANT

## 2025-07-18 RX ADMIN — INSULIN ASPART 2 UNITS: 100 INJECTION, SOLUTION INTRAVENOUS; SUBCUTANEOUS at 11:07

## 2025-07-18 RX ADMIN — OXYCODONE HYDROCHLORIDE AND ACETAMINOPHEN 500 MG: 500 TABLET ORAL at 08:07

## 2025-07-18 RX ADMIN — Medication 1 CAPSULE: at 11:07

## 2025-07-18 RX ADMIN — Medication 10 ML: at 09:07

## 2025-07-18 RX ADMIN — INSULIN ASPART 2 UNITS: 100 INJECTION, SOLUTION INTRAVENOUS; SUBCUTANEOUS at 09:07

## 2025-07-18 RX ADMIN — INSULIN ASPART 2 UNITS: 100 INJECTION, SOLUTION INTRAVENOUS; SUBCUTANEOUS at 05:07

## 2025-07-18 RX ADMIN — OXYBUTYNIN CHLORIDE 10 MG: 5 TABLET, EXTENDED RELEASE ORAL at 08:07

## 2025-07-18 RX ADMIN — DAPTOMYCIN 600 MG: 500 INJECTION, POWDER, LYOPHILIZED, FOR SOLUTION INTRAVENOUS at 02:07

## 2025-07-18 RX ADMIN — SITAGLIPTIN 50 MG: 50 TABLET, FILM COATED ORAL at 08:07

## 2025-07-18 RX ADMIN — MEROPENEM 1 G: 1 INJECTION, POWDER, FOR SOLUTION INTRAVENOUS at 09:07

## 2025-07-18 RX ADMIN — TRAMADOL HYDROCHLORIDE 50 MG: 50 TABLET, COATED ORAL at 01:07

## 2025-07-18 RX ADMIN — MEROPENEM 1 G: 1 INJECTION, POWDER, FOR SOLUTION INTRAVENOUS at 05:07

## 2025-07-18 RX ADMIN — Medication 1 CAPSULE: at 08:07

## 2025-07-18 RX ADMIN — MEROPENEM 1 G: 1 INJECTION, POWDER, FOR SOLUTION INTRAVENOUS at 11:07

## 2025-07-18 RX ADMIN — DILTIAZEM HYDROCHLORIDE 120 MG: 120 CAPSULE, COATED, EXTENDED RELEASE ORAL at 08:07

## 2025-07-18 RX ADMIN — ENOXAPARIN SODIUM 40 MG: 40 INJECTION SUBCUTANEOUS at 05:07

## 2025-07-18 RX ADMIN — ZINC SULFATE 220 MG (50 MG) CAPSULE 220 MG: CAPSULE at 08:07

## 2025-07-18 RX ADMIN — INSULIN GLARGINE 60 UNITS: 100 INJECTION, SOLUTION SUBCUTANEOUS at 09:07

## 2025-07-18 RX ADMIN — Medication 1 CAPSULE: at 05:07

## 2025-07-19 LAB
GLUCOSE SERPL-MCNC: 102 MG/DL (ref 70–110)
GLUCOSE SERPL-MCNC: 133 MG/DL (ref 70–110)
GLUCOSE SERPL-MCNC: 168 MG/DL (ref 70–110)
GLUCOSE SERPL-MCNC: 223 MG/DL (ref 70–110)

## 2025-07-19 PROCEDURE — 25000003 PHARM REV CODE 250: Performed by: INTERNAL MEDICINE

## 2025-07-19 PROCEDURE — 63600175 PHARM REV CODE 636 W HCPCS: Performed by: INTERNAL MEDICINE

## 2025-07-19 PROCEDURE — 25000003 PHARM REV CODE 250: Performed by: PHYSICIAN ASSISTANT

## 2025-07-19 PROCEDURE — 27000207 HC ISOLATION

## 2025-07-19 PROCEDURE — A4216 STERILE WATER/SALINE, 10 ML: HCPCS | Performed by: INTERNAL MEDICINE

## 2025-07-19 PROCEDURE — 63600175 PHARM REV CODE 636 W HCPCS: Performed by: PHYSICIAN ASSISTANT

## 2025-07-19 PROCEDURE — 11000004 HC SNF PRIVATE

## 2025-07-19 PROCEDURE — 25000003 PHARM REV CODE 250: Performed by: FAMILY MEDICINE

## 2025-07-19 RX ORDER — PANTOPRAZOLE SODIUM 40 MG/1
40 TABLET, DELAYED RELEASE ORAL DAILY
Status: DISCONTINUED | OUTPATIENT
Start: 2025-07-19 | End: 2025-08-21 | Stop reason: HOSPADM

## 2025-07-19 RX ORDER — CALCIUM CARBONATE 200(500)MG
500 TABLET,CHEWABLE ORAL 3 TIMES DAILY PRN
Status: DISCONTINUED | OUTPATIENT
Start: 2025-07-19 | End: 2025-08-21 | Stop reason: HOSPADM

## 2025-07-19 RX ADMIN — ENOXAPARIN SODIUM 40 MG: 40 INJECTION SUBCUTANEOUS at 04:07

## 2025-07-19 RX ADMIN — OXYCODONE HYDROCHLORIDE AND ACETAMINOPHEN 500 MG: 500 TABLET ORAL at 08:07

## 2025-07-19 RX ADMIN — DILTIAZEM HYDROCHLORIDE 120 MG: 120 CAPSULE, COATED, EXTENDED RELEASE ORAL at 08:07

## 2025-07-19 RX ADMIN — TRAMADOL HYDROCHLORIDE 50 MG: 50 TABLET, COATED ORAL at 10:07

## 2025-07-19 RX ADMIN — Medication 1 CAPSULE: at 08:07

## 2025-07-19 RX ADMIN — SITAGLIPTIN 50 MG: 50 TABLET, FILM COATED ORAL at 08:07

## 2025-07-19 RX ADMIN — PANTOPRAZOLE SODIUM 40 MG: 40 TABLET, DELAYED RELEASE ORAL at 05:07

## 2025-07-19 RX ADMIN — MEROPENEM 1 G: 1 INJECTION, POWDER, FOR SOLUTION INTRAVENOUS at 05:07

## 2025-07-19 RX ADMIN — Medication 10 ML: at 09:07

## 2025-07-19 RX ADMIN — ZINC SULFATE 220 MG (50 MG) CAPSULE 220 MG: CAPSULE at 08:07

## 2025-07-19 RX ADMIN — CALCIUM CARBONATE (ANTACID) CHEW TAB 500 MG 500 MG: 500 CHEW TAB at 05:07

## 2025-07-19 RX ADMIN — DAPTOMYCIN 600 MG: 500 INJECTION, POWDER, LYOPHILIZED, FOR SOLUTION INTRAVENOUS at 03:07

## 2025-07-19 RX ADMIN — INSULIN GLARGINE 60 UNITS: 100 INJECTION, SOLUTION SUBCUTANEOUS at 09:07

## 2025-07-19 RX ADMIN — MEROPENEM 1 G: 1 INJECTION, POWDER, FOR SOLUTION INTRAVENOUS at 01:07

## 2025-07-19 RX ADMIN — OXYBUTYNIN CHLORIDE 10 MG: 5 TABLET, EXTENDED RELEASE ORAL at 08:07

## 2025-07-19 RX ADMIN — MEROPENEM 1 G: 1 INJECTION, POWDER, FOR SOLUTION INTRAVENOUS at 09:07

## 2025-07-19 RX ADMIN — Medication 1 CAPSULE: at 11:07

## 2025-07-19 RX ADMIN — Medication 10 ML: at 05:07

## 2025-07-19 RX ADMIN — INSULIN ASPART 2 UNITS: 100 INJECTION, SOLUTION INTRAVENOUS; SUBCUTANEOUS at 09:07

## 2025-07-19 RX ADMIN — Medication 1 CAPSULE: at 04:07

## 2025-07-20 LAB
GLUCOSE SERPL-MCNC: 196 MG/DL (ref 70–110)
GLUCOSE SERPL-MCNC: 197 MG/DL (ref 70–110)
GLUCOSE SERPL-MCNC: 251 MG/DL (ref 70–110)
GLUCOSE SERPL-MCNC: 57 MG/DL (ref 70–110)
POCT GLUCOSE: 83 MG/DL (ref 70–110)

## 2025-07-20 PROCEDURE — 25000003 PHARM REV CODE 250: Performed by: FAMILY MEDICINE

## 2025-07-20 PROCEDURE — 11000004 HC SNF PRIVATE

## 2025-07-20 PROCEDURE — 25000003 PHARM REV CODE 250: Performed by: INTERNAL MEDICINE

## 2025-07-20 PROCEDURE — 63600175 PHARM REV CODE 636 W HCPCS: Performed by: INTERNAL MEDICINE

## 2025-07-20 PROCEDURE — 25000003 PHARM REV CODE 250: Performed by: PHYSICIAN ASSISTANT

## 2025-07-20 PROCEDURE — A4216 STERILE WATER/SALINE, 10 ML: HCPCS | Performed by: INTERNAL MEDICINE

## 2025-07-20 PROCEDURE — 27000207 HC ISOLATION

## 2025-07-20 PROCEDURE — 63600175 PHARM REV CODE 636 W HCPCS: Performed by: PHYSICIAN ASSISTANT

## 2025-07-20 RX ORDER — ONDANSETRON 4 MG/1
4 TABLET, ORALLY DISINTEGRATING ORAL EVERY 8 HOURS PRN
Status: DISCONTINUED | OUTPATIENT
Start: 2025-07-20 | End: 2025-08-21 | Stop reason: HOSPADM

## 2025-07-20 RX ADMIN — TRAMADOL HYDROCHLORIDE 50 MG: 50 TABLET, COATED ORAL at 10:07

## 2025-07-20 RX ADMIN — Medication 1 CAPSULE: at 08:07

## 2025-07-20 RX ADMIN — Medication 10 ML: at 05:07

## 2025-07-20 RX ADMIN — INSULIN ASPART 6 UNITS: 100 INJECTION, SOLUTION INTRAVENOUS; SUBCUTANEOUS at 04:07

## 2025-07-20 RX ADMIN — INSULIN GLARGINE 60 UNITS: 100 INJECTION, SOLUTION SUBCUTANEOUS at 08:07

## 2025-07-20 RX ADMIN — ENOXAPARIN SODIUM 40 MG: 40 INJECTION SUBCUTANEOUS at 04:07

## 2025-07-20 RX ADMIN — Medication 1 CAPSULE: at 04:07

## 2025-07-20 RX ADMIN — PANTOPRAZOLE SODIUM 40 MG: 40 TABLET, DELAYED RELEASE ORAL at 08:07

## 2025-07-20 RX ADMIN — MEROPENEM 1 G: 1 INJECTION, POWDER, FOR SOLUTION INTRAVENOUS at 05:07

## 2025-07-20 RX ADMIN — MEROPENEM 1 G: 1 INJECTION, POWDER, FOR SOLUTION INTRAVENOUS at 12:07

## 2025-07-20 RX ADMIN — OXYCODONE HYDROCHLORIDE AND ACETAMINOPHEN 500 MG: 500 TABLET ORAL at 08:07

## 2025-07-20 RX ADMIN — MEROPENEM 1 G: 1 INJECTION, POWDER, FOR SOLUTION INTRAVENOUS at 08:07

## 2025-07-20 RX ADMIN — ZINC SULFATE 220 MG (50 MG) CAPSULE 220 MG: CAPSULE at 08:07

## 2025-07-20 RX ADMIN — INSULIN ASPART 2 UNITS: 100 INJECTION, SOLUTION INTRAVENOUS; SUBCUTANEOUS at 10:07

## 2025-07-20 RX ADMIN — DAPTOMYCIN 600 MG: 500 INJECTION, POWDER, LYOPHILIZED, FOR SOLUTION INTRAVENOUS at 02:07

## 2025-07-20 RX ADMIN — ONDANSETRON 4 MG: 4 TABLET, ORALLY DISINTEGRATING ORAL at 02:07

## 2025-07-20 RX ADMIN — DILTIAZEM HYDROCHLORIDE 120 MG: 120 CAPSULE, COATED, EXTENDED RELEASE ORAL at 08:07

## 2025-07-20 RX ADMIN — OXYBUTYNIN CHLORIDE 10 MG: 5 TABLET, EXTENDED RELEASE ORAL at 08:07

## 2025-07-20 RX ADMIN — Medication 1 CAPSULE: at 12:07

## 2025-07-20 RX ADMIN — SITAGLIPTIN 50 MG: 50 TABLET, FILM COATED ORAL at 08:07

## 2025-07-21 LAB
ANION GAP SERPL CALC-SCNC: 7 MEQ/L
BASOPHILS # BLD AUTO: 0.03 X10(3)/MCL
BASOPHILS NFR BLD AUTO: 0.4 %
BUN SERPL-MCNC: 67.1 MG/DL (ref 8.4–25.7)
CALCIUM SERPL-MCNC: 8.9 MG/DL (ref 8.4–10.2)
CHLORIDE SERPL-SCNC: 109 MMOL/L (ref 98–107)
CK SERPL-CCNC: 15 U/L (ref 30–200)
CO2 SERPL-SCNC: 24 MMOL/L (ref 22–29)
CREAT SERPL-MCNC: 0.83 MG/DL (ref 0.72–1.25)
CREAT/UREA NIT SERPL: 81
EOSINOPHIL # BLD AUTO: 0.36 X10(3)/MCL (ref 0–0.9)
EOSINOPHIL NFR BLD AUTO: 4.2 %
ERYTHROCYTE [DISTWIDTH] IN BLOOD BY AUTOMATED COUNT: 18.3 % (ref 11.5–17)
GFR SERPLBLD CREATININE-BSD FMLA CKD-EPI: >60 ML/MIN/1.73/M2
GLUCOSE SERPL-MCNC: 129 MG/DL (ref 74–100)
GLUCOSE SERPL-MCNC: 138 MG/DL (ref 70–110)
GLUCOSE SERPL-MCNC: 148 MG/DL (ref 70–110)
GLUCOSE SERPL-MCNC: 93 MG/DL (ref 70–110)
HCT VFR BLD AUTO: 27.2 % (ref 42–52)
HGB BLD-MCNC: 8.4 G/DL (ref 14–18)
IMM GRANULOCYTES # BLD AUTO: 0.04 X10(3)/MCL (ref 0–0.04)
IMM GRANULOCYTES NFR BLD AUTO: 0.5 %
LYMPHOCYTES # BLD AUTO: 2.89 X10(3)/MCL (ref 0.6–4.6)
LYMPHOCYTES NFR BLD AUTO: 33.8 %
MCH RBC QN AUTO: 26.3 PG (ref 27–31)
MCHC RBC AUTO-ENTMCNC: 30.9 G/DL (ref 33–36)
MCV RBC AUTO: 85 FL (ref 80–94)
MONOCYTES # BLD AUTO: 0.51 X10(3)/MCL (ref 0.1–1.3)
MONOCYTES NFR BLD AUTO: 6 %
NEUTROPHILS # BLD AUTO: 4.73 X10(3)/MCL (ref 2.1–9.2)
NEUTROPHILS NFR BLD AUTO: 55.1 %
PLATELET # BLD AUTO: 324 X10(3)/MCL (ref 130–400)
PMV BLD AUTO: 10.3 FL (ref 7.4–10.4)
POTASSIUM SERPL-SCNC: 5.4 MMOL/L (ref 3.5–5.1)
POTASSIUM SERPL-SCNC: 5.5 MMOL/L (ref 3.5–5.1)
RBC # BLD AUTO: 3.2 X10(6)/MCL (ref 4.7–6.1)
SODIUM SERPL-SCNC: 140 MMOL/L (ref 136–145)
WBC # BLD AUTO: 8.56 X10(3)/MCL (ref 4.5–11.5)

## 2025-07-21 PROCEDURE — 63600175 PHARM REV CODE 636 W HCPCS: Performed by: INTERNAL MEDICINE

## 2025-07-21 PROCEDURE — 84132 ASSAY OF SERUM POTASSIUM: CPT | Performed by: PHYSICIAN ASSISTANT

## 2025-07-21 PROCEDURE — 25000003 PHARM REV CODE 250: Performed by: INTERNAL MEDICINE

## 2025-07-21 PROCEDURE — 63600175 PHARM REV CODE 636 W HCPCS: Performed by: PHYSICIAN ASSISTANT

## 2025-07-21 PROCEDURE — 82550 ASSAY OF CK (CPK): CPT | Performed by: FAMILY MEDICINE

## 2025-07-21 PROCEDURE — 80048 BASIC METABOLIC PNL TOTAL CA: CPT | Performed by: FAMILY MEDICINE

## 2025-07-21 PROCEDURE — 27000207 HC ISOLATION

## 2025-07-21 PROCEDURE — 36569 INSJ PICC 5 YR+ W/O IMAGING: CPT

## 2025-07-21 PROCEDURE — 36415 COLL VENOUS BLD VENIPUNCTURE: CPT | Performed by: FAMILY MEDICINE

## 2025-07-21 PROCEDURE — 11000004 HC SNF PRIVATE

## 2025-07-21 PROCEDURE — A4216 STERILE WATER/SALINE, 10 ML: HCPCS | Performed by: INTERNAL MEDICINE

## 2025-07-21 PROCEDURE — 25000003 PHARM REV CODE 250: Performed by: FAMILY MEDICINE

## 2025-07-21 PROCEDURE — 85025 COMPLETE CBC W/AUTO DIFF WBC: CPT | Performed by: FAMILY MEDICINE

## 2025-07-21 PROCEDURE — 36415 COLL VENOUS BLD VENIPUNCTURE: CPT | Performed by: PHYSICIAN ASSISTANT

## 2025-07-21 PROCEDURE — 25000003 PHARM REV CODE 250: Performed by: PHYSICIAN ASSISTANT

## 2025-07-21 RX ORDER — SODIUM CHLORIDE 9 MG/ML
INJECTION, SOLUTION INTRAVENOUS CONTINUOUS
Status: ACTIVE | OUTPATIENT
Start: 2025-07-21 | End: 2025-07-21

## 2025-07-21 RX ADMIN — DAPTOMYCIN 600 MG: 500 INJECTION, POWDER, LYOPHILIZED, FOR SOLUTION INTRAVENOUS at 03:07

## 2025-07-21 RX ADMIN — OXYBUTYNIN CHLORIDE 10 MG: 5 TABLET, EXTENDED RELEASE ORAL at 08:07

## 2025-07-21 RX ADMIN — OXYCODONE HYDROCHLORIDE AND ACETAMINOPHEN 500 MG: 500 TABLET ORAL at 08:07

## 2025-07-21 RX ADMIN — SODIUM ZIRCONIUM CYCLOSILICATE 10 G: 10 POWDER, FOR SUSPENSION ORAL at 12:07

## 2025-07-21 RX ADMIN — ENOXAPARIN SODIUM 40 MG: 40 INJECTION SUBCUTANEOUS at 04:07

## 2025-07-21 RX ADMIN — PANTOPRAZOLE SODIUM 40 MG: 40 TABLET, DELAYED RELEASE ORAL at 08:07

## 2025-07-21 RX ADMIN — Medication 1 CAPSULE: at 12:07

## 2025-07-21 RX ADMIN — TRAMADOL HYDROCHLORIDE 50 MG: 50 TABLET, COATED ORAL at 11:07

## 2025-07-21 RX ADMIN — SODIUM CHLORIDE: 9 INJECTION, SOLUTION INTRAVENOUS at 10:07

## 2025-07-21 RX ADMIN — MEROPENEM 1 G: 1 INJECTION, POWDER, FOR SOLUTION INTRAVENOUS at 12:07

## 2025-07-21 RX ADMIN — ZINC SULFATE 220 MG (50 MG) CAPSULE 220 MG: CAPSULE at 08:07

## 2025-07-21 RX ADMIN — SITAGLIPTIN 50 MG: 50 TABLET, FILM COATED ORAL at 08:07

## 2025-07-21 RX ADMIN — Medication 1 CAPSULE: at 04:07

## 2025-07-21 RX ADMIN — INSULIN GLARGINE 60 UNITS: 100 INJECTION, SOLUTION SUBCUTANEOUS at 08:07

## 2025-07-21 RX ADMIN — MEROPENEM 1 G: 1 INJECTION, POWDER, FOR SOLUTION INTRAVENOUS at 08:07

## 2025-07-21 RX ADMIN — Medication 10 ML: at 06:07

## 2025-07-21 RX ADMIN — Medication 1 CAPSULE: at 08:07

## 2025-07-21 RX ADMIN — MEROPENEM 1 G: 1 INJECTION, POWDER, FOR SOLUTION INTRAVENOUS at 06:07

## 2025-07-21 RX ADMIN — DILTIAZEM HYDROCHLORIDE 120 MG: 120 CAPSULE, COATED, EXTENDED RELEASE ORAL at 08:07

## 2025-07-22 LAB
ALBUMIN SERPL-MCNC: 2.1 G/DL (ref 3.5–5)
ALBUMIN/GLOB SERPL: 0.6 RATIO (ref 1.1–2)
ALP SERPL-CCNC: 82 UNIT/L (ref 40–150)
ALT SERPL-CCNC: 18 UNIT/L (ref 0–55)
ANION GAP SERPL CALC-SCNC: 6 MEQ/L
AST SERPL-CCNC: 8 UNIT/L (ref 11–45)
BILIRUB SERPL-MCNC: 0.2 MG/DL
BUN SERPL-MCNC: 61.7 MG/DL (ref 8.4–25.7)
CALCIUM SERPL-MCNC: 8.2 MG/DL (ref 8.4–10.2)
CHLORIDE SERPL-SCNC: 110 MMOL/L (ref 98–107)
CO2 SERPL-SCNC: 24 MMOL/L (ref 22–29)
CREAT SERPL-MCNC: 0.84 MG/DL (ref 0.72–1.25)
CREAT/UREA NIT SERPL: 73
GFR SERPLBLD CREATININE-BSD FMLA CKD-EPI: >60 ML/MIN/1.73/M2
GLOBULIN SER-MCNC: 3.7 GM/DL (ref 2.4–3.5)
GLUCOSE SERPL-MCNC: 109 MG/DL (ref 74–100)
GLUCOSE SERPL-MCNC: 177 MG/DL (ref 70–110)
GLUCOSE SERPL-MCNC: 197 MG/DL (ref 70–110)
POTASSIUM SERPL-SCNC: 5 MMOL/L (ref 3.5–5.1)
PREALB SERPL-MCNC: 22.9 MG/DL (ref 18–45)
PROT SERPL-MCNC: 5.8 GM/DL (ref 6.4–8.3)
SODIUM SERPL-SCNC: 140 MMOL/L (ref 136–145)

## 2025-07-22 PROCEDURE — 25000003 PHARM REV CODE 250: Performed by: PHYSICIAN ASSISTANT

## 2025-07-22 PROCEDURE — 11000004 HC SNF PRIVATE

## 2025-07-22 PROCEDURE — 63600175 PHARM REV CODE 636 W HCPCS: Performed by: PHYSICIAN ASSISTANT

## 2025-07-22 PROCEDURE — 63600175 PHARM REV CODE 636 W HCPCS: Performed by: INTERNAL MEDICINE

## 2025-07-22 PROCEDURE — 80053 COMPREHEN METABOLIC PANEL: CPT | Performed by: PHYSICIAN ASSISTANT

## 2025-07-22 PROCEDURE — 25000003 PHARM REV CODE 250: Performed by: INTERNAL MEDICINE

## 2025-07-22 PROCEDURE — 36415 COLL VENOUS BLD VENIPUNCTURE: CPT | Performed by: PHYSICIAN ASSISTANT

## 2025-07-22 PROCEDURE — 27000207 HC ISOLATION

## 2025-07-22 PROCEDURE — 25000003 PHARM REV CODE 250: Performed by: FAMILY MEDICINE

## 2025-07-22 PROCEDURE — 84134 ASSAY OF PREALBUMIN: CPT | Performed by: PHYSICIAN ASSISTANT

## 2025-07-22 RX ORDER — SODIUM CHLORIDE 9 MG/ML
INJECTION, SOLUTION INTRAVENOUS CONTINUOUS
Status: ACTIVE | OUTPATIENT
Start: 2025-07-22 | End: 2025-07-23

## 2025-07-22 RX ADMIN — PANTOPRAZOLE SODIUM 40 MG: 40 TABLET, DELAYED RELEASE ORAL at 09:07

## 2025-07-22 RX ADMIN — SITAGLIPTIN 50 MG: 50 TABLET, FILM COATED ORAL at 09:07

## 2025-07-22 RX ADMIN — MEROPENEM 1 G: 1 INJECTION, POWDER, FOR SOLUTION INTRAVENOUS at 08:07

## 2025-07-22 RX ADMIN — OXYBUTYNIN CHLORIDE 10 MG: 5 TABLET, EXTENDED RELEASE ORAL at 09:07

## 2025-07-22 RX ADMIN — Medication 1 CAPSULE: at 04:07

## 2025-07-22 RX ADMIN — DAPTOMYCIN 600 MG: 500 INJECTION, POWDER, LYOPHILIZED, FOR SOLUTION INTRAVENOUS at 03:07

## 2025-07-22 RX ADMIN — MEROPENEM 1 G: 1 INJECTION, POWDER, FOR SOLUTION INTRAVENOUS at 01:07

## 2025-07-22 RX ADMIN — ZINC SULFATE 220 MG (50 MG) CAPSULE 220 MG: CAPSULE at 09:07

## 2025-07-22 RX ADMIN — SODIUM CHLORIDE: 9 INJECTION, SOLUTION INTRAVENOUS at 12:07

## 2025-07-22 RX ADMIN — Medication 1 CAPSULE: at 09:07

## 2025-07-22 RX ADMIN — MEROPENEM 1 G: 1 INJECTION, POWDER, FOR SOLUTION INTRAVENOUS at 05:07

## 2025-07-22 RX ADMIN — Medication 1 CAPSULE: at 12:07

## 2025-07-22 RX ADMIN — ENOXAPARIN SODIUM 40 MG: 40 INJECTION SUBCUTANEOUS at 04:07

## 2025-07-22 RX ADMIN — OXYCODONE HYDROCHLORIDE AND ACETAMINOPHEN 500 MG: 500 TABLET ORAL at 09:07

## 2025-07-22 RX ADMIN — DILTIAZEM HYDROCHLORIDE 120 MG: 120 CAPSULE, COATED, EXTENDED RELEASE ORAL at 09:07

## 2025-07-22 RX ADMIN — INSULIN GLARGINE 60 UNITS: 100 INJECTION, SOLUTION SUBCUTANEOUS at 08:07

## 2025-07-22 RX ADMIN — TRAMADOL HYDROCHLORIDE 50 MG: 50 TABLET, COATED ORAL at 03:07

## 2025-07-23 LAB
GLUCOSE SERPL-MCNC: 221 MG/DL (ref 70–110)
GLUCOSE SERPL-MCNC: 80 MG/DL (ref 70–110)

## 2025-07-23 PROCEDURE — 27000207 HC ISOLATION

## 2025-07-23 PROCEDURE — 25000003 PHARM REV CODE 250: Performed by: INTERNAL MEDICINE

## 2025-07-23 PROCEDURE — 25000003 PHARM REV CODE 250: Performed by: FAMILY MEDICINE

## 2025-07-23 PROCEDURE — 25000003 PHARM REV CODE 250: Performed by: PHYSICIAN ASSISTANT

## 2025-07-23 PROCEDURE — 63600175 PHARM REV CODE 636 W HCPCS: Performed by: INTERNAL MEDICINE

## 2025-07-23 PROCEDURE — 11000004 HC SNF PRIVATE

## 2025-07-23 PROCEDURE — 63600175 PHARM REV CODE 636 W HCPCS: Performed by: PHYSICIAN ASSISTANT

## 2025-07-23 RX ADMIN — Medication 1 CAPSULE: at 08:07

## 2025-07-23 RX ADMIN — INSULIN GLARGINE 60 UNITS: 100 INJECTION, SOLUTION SUBCUTANEOUS at 09:07

## 2025-07-23 RX ADMIN — PANTOPRAZOLE SODIUM 40 MG: 40 TABLET, DELAYED RELEASE ORAL at 08:07

## 2025-07-23 RX ADMIN — INSULIN ASPART 2 UNITS: 100 INJECTION, SOLUTION INTRAVENOUS; SUBCUTANEOUS at 05:07

## 2025-07-23 RX ADMIN — ZINC SULFATE 220 MG (50 MG) CAPSULE 220 MG: CAPSULE at 08:07

## 2025-07-23 RX ADMIN — Medication 1 CAPSULE: at 05:07

## 2025-07-23 RX ADMIN — OXYCODONE HYDROCHLORIDE AND ACETAMINOPHEN 500 MG: 500 TABLET ORAL at 08:07

## 2025-07-23 RX ADMIN — Medication 1 CAPSULE: at 12:07

## 2025-07-23 RX ADMIN — MEROPENEM 1 G: 1 INJECTION, POWDER, FOR SOLUTION INTRAVENOUS at 05:07

## 2025-07-23 RX ADMIN — SITAGLIPTIN 50 MG: 50 TABLET, FILM COATED ORAL at 08:07

## 2025-07-23 RX ADMIN — INSULIN ASPART 4 UNITS: 100 INJECTION, SOLUTION INTRAVENOUS; SUBCUTANEOUS at 01:07

## 2025-07-23 RX ADMIN — MEROPENEM 1 G: 1 INJECTION, POWDER, FOR SOLUTION INTRAVENOUS at 09:07

## 2025-07-23 RX ADMIN — INSULIN ASPART 1 UNITS: 100 INJECTION, SOLUTION INTRAVENOUS; SUBCUTANEOUS at 09:07

## 2025-07-23 RX ADMIN — MEROPENEM 1 G: 1 INJECTION, POWDER, FOR SOLUTION INTRAVENOUS at 01:07

## 2025-07-23 RX ADMIN — OXYBUTYNIN CHLORIDE 10 MG: 5 TABLET, EXTENDED RELEASE ORAL at 08:07

## 2025-07-23 RX ADMIN — TRAMADOL HYDROCHLORIDE 50 MG: 50 TABLET, COATED ORAL at 02:07

## 2025-07-23 RX ADMIN — DAPTOMYCIN 600 MG: 500 INJECTION, POWDER, LYOPHILIZED, FOR SOLUTION INTRAVENOUS at 02:07

## 2025-07-23 RX ADMIN — DILTIAZEM HYDROCHLORIDE 120 MG: 120 CAPSULE, COATED, EXTENDED RELEASE ORAL at 08:07

## 2025-07-23 RX ADMIN — ENOXAPARIN SODIUM 40 MG: 40 INJECTION SUBCUTANEOUS at 05:07

## 2025-07-24 LAB
GLUCOSE SERPL-MCNC: 121 MG/DL (ref 70–110)
GLUCOSE SERPL-MCNC: 167 MG/DL (ref 70–110)
GLUCOSE SERPL-MCNC: 89 MG/DL (ref 70–110)

## 2025-07-24 PROCEDURE — 25000003 PHARM REV CODE 250: Performed by: PHYSICIAN ASSISTANT

## 2025-07-24 PROCEDURE — 11000004 HC SNF PRIVATE

## 2025-07-24 PROCEDURE — 27000207 HC ISOLATION

## 2025-07-24 PROCEDURE — 25000003 PHARM REV CODE 250: Performed by: INTERNAL MEDICINE

## 2025-07-24 PROCEDURE — 25000003 PHARM REV CODE 250: Performed by: FAMILY MEDICINE

## 2025-07-24 PROCEDURE — 63600175 PHARM REV CODE 636 W HCPCS: Performed by: INTERNAL MEDICINE

## 2025-07-24 PROCEDURE — 63600175 PHARM REV CODE 636 W HCPCS: Mod: JZ,TB | Performed by: PHYSICIAN ASSISTANT

## 2025-07-24 RX ADMIN — OXYCODONE HYDROCHLORIDE AND ACETAMINOPHEN 500 MG: 500 TABLET ORAL at 08:07

## 2025-07-24 RX ADMIN — Medication 1 CAPSULE: at 08:07

## 2025-07-24 RX ADMIN — MEROPENEM 1 G: 1 INJECTION, POWDER, FOR SOLUTION INTRAVENOUS at 09:07

## 2025-07-24 RX ADMIN — Medication 1 CAPSULE: at 12:07

## 2025-07-24 RX ADMIN — MEROPENEM 1 G: 1 INJECTION, POWDER, FOR SOLUTION INTRAVENOUS at 01:07

## 2025-07-24 RX ADMIN — SITAGLIPTIN 50 MG: 50 TABLET, FILM COATED ORAL at 08:07

## 2025-07-24 RX ADMIN — INSULIN ASPART 2 UNITS: 100 INJECTION, SOLUTION INTRAVENOUS; SUBCUTANEOUS at 09:07

## 2025-07-24 RX ADMIN — TRAMADOL HYDROCHLORIDE 50 MG: 50 TABLET, COATED ORAL at 10:07

## 2025-07-24 RX ADMIN — Medication 1 CAPSULE: at 05:07

## 2025-07-24 RX ADMIN — INSULIN GLARGINE 60 UNITS: 100 INJECTION, SOLUTION SUBCUTANEOUS at 09:07

## 2025-07-24 RX ADMIN — ALTEPLASE 2 MG: 2.2 INJECTION, POWDER, LYOPHILIZED, FOR SOLUTION INTRAVENOUS at 05:07

## 2025-07-24 RX ADMIN — PANTOPRAZOLE SODIUM 40 MG: 40 TABLET, DELAYED RELEASE ORAL at 08:07

## 2025-07-24 RX ADMIN — MEROPENEM 1 G: 1 INJECTION, POWDER, FOR SOLUTION INTRAVENOUS at 06:07

## 2025-07-24 RX ADMIN — OXYBUTYNIN CHLORIDE 10 MG: 5 TABLET, EXTENDED RELEASE ORAL at 08:07

## 2025-07-24 RX ADMIN — ENOXAPARIN SODIUM 40 MG: 40 INJECTION SUBCUTANEOUS at 05:07

## 2025-07-24 RX ADMIN — DILTIAZEM HYDROCHLORIDE 120 MG: 120 CAPSULE, COATED, EXTENDED RELEASE ORAL at 08:07

## 2025-07-24 RX ADMIN — DAPTOMYCIN 600 MG: 500 INJECTION, POWDER, LYOPHILIZED, FOR SOLUTION INTRAVENOUS at 03:07

## 2025-07-24 RX ADMIN — ZINC SULFATE 220 MG (50 MG) CAPSULE 220 MG: CAPSULE at 08:07

## 2025-07-25 LAB
GLUCOSE SERPL-MCNC: 145 MG/DL (ref 70–110)
GLUCOSE SERPL-MCNC: 182 MG/DL (ref 70–110)

## 2025-07-25 PROCEDURE — 11000004 HC SNF PRIVATE

## 2025-07-25 PROCEDURE — 25000003 PHARM REV CODE 250: Performed by: FAMILY MEDICINE

## 2025-07-25 PROCEDURE — 25000003 PHARM REV CODE 250: Performed by: INTERNAL MEDICINE

## 2025-07-25 PROCEDURE — 63600175 PHARM REV CODE 636 W HCPCS: Performed by: PHYSICIAN ASSISTANT

## 2025-07-25 PROCEDURE — 25000003 PHARM REV CODE 250: Performed by: PHYSICIAN ASSISTANT

## 2025-07-25 PROCEDURE — 63600175 PHARM REV CODE 636 W HCPCS: Performed by: INTERNAL MEDICINE

## 2025-07-25 PROCEDURE — 27000207 HC ISOLATION

## 2025-07-25 RX ADMIN — INSULIN ASPART 2 UNITS: 100 INJECTION, SOLUTION INTRAVENOUS; SUBCUTANEOUS at 11:07

## 2025-07-25 RX ADMIN — OXYBUTYNIN CHLORIDE 10 MG: 5 TABLET, EXTENDED RELEASE ORAL at 09:07

## 2025-07-25 RX ADMIN — Medication 1 CAPSULE: at 05:07

## 2025-07-25 RX ADMIN — ENOXAPARIN SODIUM 40 MG: 40 INJECTION SUBCUTANEOUS at 05:07

## 2025-07-25 RX ADMIN — DILTIAZEM HYDROCHLORIDE 120 MG: 120 CAPSULE, COATED, EXTENDED RELEASE ORAL at 09:07

## 2025-07-25 RX ADMIN — Medication 1 CAPSULE: at 11:07

## 2025-07-25 RX ADMIN — INSULIN GLARGINE 60 UNITS: 100 INJECTION, SOLUTION SUBCUTANEOUS at 08:07

## 2025-07-25 RX ADMIN — TRAMADOL HYDROCHLORIDE 50 MG: 50 TABLET, COATED ORAL at 01:07

## 2025-07-25 RX ADMIN — SITAGLIPTIN 50 MG: 50 TABLET, FILM COATED ORAL at 09:07

## 2025-07-25 RX ADMIN — OXYCODONE HYDROCHLORIDE AND ACETAMINOPHEN 500 MG: 500 TABLET ORAL at 09:07

## 2025-07-25 RX ADMIN — Medication 1 CAPSULE: at 09:07

## 2025-07-25 RX ADMIN — MEROPENEM 1 G: 1 INJECTION, POWDER, FOR SOLUTION INTRAVENOUS at 12:07

## 2025-07-25 RX ADMIN — PANTOPRAZOLE SODIUM 40 MG: 40 TABLET, DELAYED RELEASE ORAL at 09:07

## 2025-07-25 RX ADMIN — MEROPENEM 1 G: 1 INJECTION, POWDER, FOR SOLUTION INTRAVENOUS at 05:07

## 2025-07-25 RX ADMIN — MEROPENEM 1 G: 1 INJECTION, POWDER, FOR SOLUTION INTRAVENOUS at 08:07

## 2025-07-25 RX ADMIN — ZINC SULFATE 220 MG (50 MG) CAPSULE 220 MG: CAPSULE at 09:07

## 2025-07-25 RX ADMIN — DAPTOMYCIN 600 MG: 500 INJECTION, POWDER, LYOPHILIZED, FOR SOLUTION INTRAVENOUS at 03:07

## 2025-07-26 LAB
GLUCOSE SERPL-MCNC: 139 MG/DL (ref 70–110)
GLUCOSE SERPL-MCNC: 200 MG/DL (ref 70–110)
GLUCOSE SERPL-MCNC: 216 MG/DL (ref 70–110)

## 2025-07-26 PROCEDURE — 11000004 HC SNF PRIVATE

## 2025-07-26 PROCEDURE — 25000003 PHARM REV CODE 250: Performed by: FAMILY MEDICINE

## 2025-07-26 PROCEDURE — 25000003 PHARM REV CODE 250: Performed by: INTERNAL MEDICINE

## 2025-07-26 PROCEDURE — 27000207 HC ISOLATION

## 2025-07-26 PROCEDURE — 25000003 PHARM REV CODE 250: Performed by: PHYSICIAN ASSISTANT

## 2025-07-26 PROCEDURE — 63600175 PHARM REV CODE 636 W HCPCS: Performed by: PHYSICIAN ASSISTANT

## 2025-07-26 PROCEDURE — 63600175 PHARM REV CODE 636 W HCPCS: Performed by: INTERNAL MEDICINE

## 2025-07-26 RX ADMIN — MEROPENEM 1 G: 1 INJECTION, POWDER, FOR SOLUTION INTRAVENOUS at 05:07

## 2025-07-26 RX ADMIN — MEROPENEM 1 G: 1 INJECTION, POWDER, FOR SOLUTION INTRAVENOUS at 08:07

## 2025-07-26 RX ADMIN — PANTOPRAZOLE SODIUM 40 MG: 40 TABLET, DELAYED RELEASE ORAL at 09:07

## 2025-07-26 RX ADMIN — ENOXAPARIN SODIUM 40 MG: 40 INJECTION SUBCUTANEOUS at 05:07

## 2025-07-26 RX ADMIN — TRAMADOL HYDROCHLORIDE 50 MG: 50 TABLET, COATED ORAL at 12:07

## 2025-07-26 RX ADMIN — DILTIAZEM HYDROCHLORIDE 120 MG: 120 CAPSULE, COATED, EXTENDED RELEASE ORAL at 09:07

## 2025-07-26 RX ADMIN — DAPTOMYCIN 600 MG: 500 INJECTION, POWDER, LYOPHILIZED, FOR SOLUTION INTRAVENOUS at 02:07

## 2025-07-26 RX ADMIN — OXYBUTYNIN CHLORIDE 10 MG: 5 TABLET, EXTENDED RELEASE ORAL at 09:07

## 2025-07-26 RX ADMIN — INSULIN ASPART 2 UNITS: 100 INJECTION, SOLUTION INTRAVENOUS; SUBCUTANEOUS at 08:07

## 2025-07-26 RX ADMIN — ONDANSETRON 4 MG: 4 TABLET, ORALLY DISINTEGRATING ORAL at 02:07

## 2025-07-26 RX ADMIN — INSULIN GLARGINE 60 UNITS: 100 INJECTION, SOLUTION SUBCUTANEOUS at 08:07

## 2025-07-26 RX ADMIN — Medication 1 CAPSULE: at 09:07

## 2025-07-26 RX ADMIN — MEROPENEM 1 G: 1 INJECTION, POWDER, FOR SOLUTION INTRAVENOUS at 12:07

## 2025-07-26 RX ADMIN — ZINC SULFATE 220 MG (50 MG) CAPSULE 220 MG: CAPSULE at 09:07

## 2025-07-26 RX ADMIN — Medication 1 CAPSULE: at 11:07

## 2025-07-26 RX ADMIN — INSULIN ASPART 2 UNITS: 100 INJECTION, SOLUTION INTRAVENOUS; SUBCUTANEOUS at 05:07

## 2025-07-26 RX ADMIN — OXYCODONE HYDROCHLORIDE AND ACETAMINOPHEN 500 MG: 500 TABLET ORAL at 09:07

## 2025-07-26 RX ADMIN — Medication 1 CAPSULE: at 05:07

## 2025-07-26 RX ADMIN — SITAGLIPTIN 50 MG: 50 TABLET, FILM COATED ORAL at 09:07

## 2025-07-27 LAB
GLUCOSE SERPL-MCNC: 136 MG/DL (ref 70–110)
GLUCOSE SERPL-MCNC: 142 MG/DL (ref 70–110)
GLUCOSE SERPL-MCNC: 96 MG/DL (ref 70–110)

## 2025-07-27 PROCEDURE — 63600175 PHARM REV CODE 636 W HCPCS: Performed by: INTERNAL MEDICINE

## 2025-07-27 PROCEDURE — 25000003 PHARM REV CODE 250: Performed by: INTERNAL MEDICINE

## 2025-07-27 PROCEDURE — 27000207 HC ISOLATION

## 2025-07-27 PROCEDURE — 11000004 HC SNF PRIVATE

## 2025-07-27 PROCEDURE — 25000003 PHARM REV CODE 250: Performed by: PHYSICIAN ASSISTANT

## 2025-07-27 PROCEDURE — 63600175 PHARM REV CODE 636 W HCPCS: Performed by: PHYSICIAN ASSISTANT

## 2025-07-27 PROCEDURE — 25000003 PHARM REV CODE 250: Performed by: FAMILY MEDICINE

## 2025-07-27 RX ADMIN — ZINC SULFATE 220 MG (50 MG) CAPSULE 220 MG: CAPSULE at 09:07

## 2025-07-27 RX ADMIN — OXYCODONE HYDROCHLORIDE AND ACETAMINOPHEN 500 MG: 500 TABLET ORAL at 09:07

## 2025-07-27 RX ADMIN — ENOXAPARIN SODIUM 40 MG: 40 INJECTION SUBCUTANEOUS at 04:07

## 2025-07-27 RX ADMIN — DAPTOMYCIN 600 MG: 500 INJECTION, POWDER, LYOPHILIZED, FOR SOLUTION INTRAVENOUS at 02:07

## 2025-07-27 RX ADMIN — Medication 1 CAPSULE: at 04:07

## 2025-07-27 RX ADMIN — MEROPENEM 1 G: 1 INJECTION, POWDER, FOR SOLUTION INTRAVENOUS at 08:07

## 2025-07-27 RX ADMIN — SITAGLIPTIN 50 MG: 50 TABLET, FILM COATED ORAL at 09:07

## 2025-07-27 RX ADMIN — DILTIAZEM HYDROCHLORIDE 120 MG: 120 CAPSULE, COATED, EXTENDED RELEASE ORAL at 09:07

## 2025-07-27 RX ADMIN — MEROPENEM 1 G: 1 INJECTION, POWDER, FOR SOLUTION INTRAVENOUS at 05:07

## 2025-07-27 RX ADMIN — INSULIN GLARGINE 60 UNITS: 100 INJECTION, SOLUTION SUBCUTANEOUS at 08:07

## 2025-07-27 RX ADMIN — Medication 1 CAPSULE: at 09:07

## 2025-07-27 RX ADMIN — Medication 1 CAPSULE: at 12:07

## 2025-07-27 RX ADMIN — MEROPENEM 1 G: 1 INJECTION, POWDER, FOR SOLUTION INTRAVENOUS at 12:07

## 2025-07-27 RX ADMIN — PANTOPRAZOLE SODIUM 40 MG: 40 TABLET, DELAYED RELEASE ORAL at 09:07

## 2025-07-27 RX ADMIN — OXYBUTYNIN CHLORIDE 10 MG: 5 TABLET, EXTENDED RELEASE ORAL at 09:07

## 2025-07-28 LAB
ALBUMIN SERPL-MCNC: 2.3 G/DL (ref 3.5–5)
ALBUMIN/GLOB SERPL: 0.6 RATIO (ref 1.1–2)
ALP SERPL-CCNC: 96 UNIT/L (ref 40–150)
ALT SERPL-CCNC: 26 UNIT/L (ref 0–55)
ANION GAP SERPL CALC-SCNC: 6 MEQ/L
AST SERPL-CCNC: 17 UNIT/L (ref 11–45)
BASOPHILS # BLD AUTO: 0.03 X10(3)/MCL
BASOPHILS NFR BLD AUTO: 0.4 %
BILIRUB SERPL-MCNC: 0.2 MG/DL
BUN SERPL-MCNC: 60.1 MG/DL (ref 8.4–25.7)
CALCIUM SERPL-MCNC: 8.5 MG/DL (ref 8.4–10.2)
CHLORIDE SERPL-SCNC: 109 MMOL/L (ref 98–107)
CK SERPL-CCNC: 23 U/L (ref 30–200)
CO2 SERPL-SCNC: 25 MMOL/L (ref 22–29)
CREAT SERPL-MCNC: 0.8 MG/DL (ref 0.72–1.25)
CREAT/UREA NIT SERPL: 75
EOSINOPHIL # BLD AUTO: 0.29 X10(3)/MCL (ref 0–0.9)
EOSINOPHIL NFR BLD AUTO: 4.3 %
ERYTHROCYTE [DISTWIDTH] IN BLOOD BY AUTOMATED COUNT: 19.1 % (ref 11.5–17)
GFR SERPLBLD CREATININE-BSD FMLA CKD-EPI: >60 ML/MIN/1.73/M2
GLOBULIN SER-MCNC: 3.6 GM/DL (ref 2.4–3.5)
GLUCOSE SERPL-MCNC: 101 MG/DL (ref 70–110)
GLUCOSE SERPL-MCNC: 131 MG/DL (ref 70–110)
GLUCOSE SERPL-MCNC: 141 MG/DL (ref 70–110)
GLUCOSE SERPL-MCNC: 85 MG/DL (ref 74–100)
HCT VFR BLD AUTO: 26.5 % (ref 42–52)
HGB BLD-MCNC: 8 G/DL (ref 14–18)
IMM GRANULOCYTES # BLD AUTO: 0.02 X10(3)/MCL (ref 0–0.04)
IMM GRANULOCYTES NFR BLD AUTO: 0.3 %
LYMPHOCYTES # BLD AUTO: 2.82 X10(3)/MCL (ref 0.6–4.6)
LYMPHOCYTES NFR BLD AUTO: 42 %
MCH RBC QN AUTO: 26.2 PG (ref 27–31)
MCHC RBC AUTO-ENTMCNC: 30.2 G/DL (ref 33–36)
MCV RBC AUTO: 86.9 FL (ref 80–94)
MONOCYTES # BLD AUTO: 0.47 X10(3)/MCL (ref 0.1–1.3)
MONOCYTES NFR BLD AUTO: 7 %
NEUTROPHILS # BLD AUTO: 3.09 X10(3)/MCL (ref 2.1–9.2)
NEUTROPHILS NFR BLD AUTO: 46 %
PLATELET # BLD AUTO: 303 X10(3)/MCL (ref 130–400)
PMV BLD AUTO: 10.6 FL (ref 7.4–10.4)
POTASSIUM SERPL-SCNC: 4.9 MMOL/L (ref 3.5–5.1)
PREALB SERPL-MCNC: 23.8 MG/DL (ref 18–45)
PROT SERPL-MCNC: 5.9 GM/DL (ref 6.4–8.3)
RBC # BLD AUTO: 3.05 X10(6)/MCL (ref 4.7–6.1)
SODIUM SERPL-SCNC: 140 MMOL/L (ref 136–145)
WBC # BLD AUTO: 6.72 X10(3)/MCL (ref 4.5–11.5)

## 2025-07-28 PROCEDURE — 25000003 PHARM REV CODE 250: Performed by: FAMILY MEDICINE

## 2025-07-28 PROCEDURE — 11000004 HC SNF PRIVATE

## 2025-07-28 PROCEDURE — 82550 ASSAY OF CK (CPK): CPT | Performed by: PHYSICIAN ASSISTANT

## 2025-07-28 PROCEDURE — 63600175 PHARM REV CODE 636 W HCPCS: Performed by: INTERNAL MEDICINE

## 2025-07-28 PROCEDURE — 80053 COMPREHEN METABOLIC PANEL: CPT | Performed by: PHYSICIAN ASSISTANT

## 2025-07-28 PROCEDURE — 25000003 PHARM REV CODE 250: Performed by: INTERNAL MEDICINE

## 2025-07-28 PROCEDURE — 27000207 HC ISOLATION

## 2025-07-28 PROCEDURE — 84134 ASSAY OF PREALBUMIN: CPT | Performed by: PHYSICIAN ASSISTANT

## 2025-07-28 PROCEDURE — 63600175 PHARM REV CODE 636 W HCPCS: Performed by: PHYSICIAN ASSISTANT

## 2025-07-28 PROCEDURE — 36415 COLL VENOUS BLD VENIPUNCTURE: CPT | Performed by: PHYSICIAN ASSISTANT

## 2025-07-28 PROCEDURE — 25000003 PHARM REV CODE 250: Performed by: PHYSICIAN ASSISTANT

## 2025-07-28 PROCEDURE — 85025 COMPLETE CBC W/AUTO DIFF WBC: CPT | Performed by: PHYSICIAN ASSISTANT

## 2025-07-28 RX ADMIN — Medication 1 CAPSULE: at 09:07

## 2025-07-28 RX ADMIN — MEROPENEM 1 G: 1 INJECTION, POWDER, FOR SOLUTION INTRAVENOUS at 12:07

## 2025-07-28 RX ADMIN — SITAGLIPTIN 50 MG: 50 TABLET, FILM COATED ORAL at 09:07

## 2025-07-28 RX ADMIN — DILTIAZEM HYDROCHLORIDE 120 MG: 120 CAPSULE, COATED, EXTENDED RELEASE ORAL at 09:07

## 2025-07-28 RX ADMIN — OXYBUTYNIN CHLORIDE 10 MG: 5 TABLET, EXTENDED RELEASE ORAL at 09:07

## 2025-07-28 RX ADMIN — INSULIN ASPART 2 UNITS: 100 INJECTION, SOLUTION INTRAVENOUS; SUBCUTANEOUS at 04:07

## 2025-07-28 RX ADMIN — ONDANSETRON 4 MG: 4 TABLET, ORALLY DISINTEGRATING ORAL at 03:07

## 2025-07-28 RX ADMIN — PANTOPRAZOLE SODIUM 40 MG: 40 TABLET, DELAYED RELEASE ORAL at 09:07

## 2025-07-28 RX ADMIN — OXYCODONE HYDROCHLORIDE AND ACETAMINOPHEN 500 MG: 500 TABLET ORAL at 09:07

## 2025-07-28 RX ADMIN — TRAMADOL HYDROCHLORIDE 50 MG: 50 TABLET, COATED ORAL at 10:07

## 2025-07-28 RX ADMIN — DAPTOMYCIN 600 MG: 500 INJECTION, POWDER, LYOPHILIZED, FOR SOLUTION INTRAVENOUS at 03:07

## 2025-07-28 RX ADMIN — MEROPENEM 1 G: 1 INJECTION, POWDER, FOR SOLUTION INTRAVENOUS at 08:07

## 2025-07-28 RX ADMIN — ZINC SULFATE 220 MG (50 MG) CAPSULE 220 MG: CAPSULE at 09:07

## 2025-07-28 RX ADMIN — Medication 1 CAPSULE: at 12:07

## 2025-07-28 RX ADMIN — ENOXAPARIN SODIUM 40 MG: 40 INJECTION SUBCUTANEOUS at 04:07

## 2025-07-28 RX ADMIN — Medication 1 CAPSULE: at 04:07

## 2025-07-28 RX ADMIN — INSULIN GLARGINE 60 UNITS: 100 INJECTION, SOLUTION SUBCUTANEOUS at 08:07

## 2025-07-28 RX ADMIN — MEROPENEM 1 G: 1 INJECTION, POWDER, FOR SOLUTION INTRAVENOUS at 05:07

## 2025-07-29 LAB
POCT GLUCOSE: 107 MG/DL (ref 70–110)
POCT GLUCOSE: 113 MG/DL (ref 70–110)
POCT GLUCOSE: 185 MG/DL (ref 70–110)
POCT GLUCOSE: 252 MG/DL (ref 70–110)
POCT GLUCOSE: 277 MG/DL (ref 70–110)

## 2025-07-29 PROCEDURE — 25000003 PHARM REV CODE 250: Performed by: FAMILY MEDICINE

## 2025-07-29 PROCEDURE — 25000003 PHARM REV CODE 250: Performed by: INTERNAL MEDICINE

## 2025-07-29 PROCEDURE — 11000004 HC SNF PRIVATE

## 2025-07-29 PROCEDURE — 25000003 PHARM REV CODE 250: Performed by: PHYSICIAN ASSISTANT

## 2025-07-29 PROCEDURE — 63600175 PHARM REV CODE 636 W HCPCS: Performed by: INTERNAL MEDICINE

## 2025-07-29 PROCEDURE — 63600175 PHARM REV CODE 636 W HCPCS: Performed by: PHYSICIAN ASSISTANT

## 2025-07-29 PROCEDURE — 27000207 HC ISOLATION

## 2025-07-29 RX ORDER — ELECTROLYTES/DEXTROSE
200 SOLUTION, ORAL ORAL
Status: DISCONTINUED | OUTPATIENT
Start: 2025-07-29 | End: 2025-08-06

## 2025-07-29 RX ADMIN — SITAGLIPTIN 50 MG: 50 TABLET, FILM COATED ORAL at 09:07

## 2025-07-29 RX ADMIN — ENOXAPARIN SODIUM 40 MG: 40 INJECTION SUBCUTANEOUS at 04:07

## 2025-07-29 RX ADMIN — Medication 200 ML: at 02:07

## 2025-07-29 RX ADMIN — ZINC SULFATE 220 MG (50 MG) CAPSULE 220 MG: CAPSULE at 09:07

## 2025-07-29 RX ADMIN — OXYCODONE HYDROCHLORIDE AND ACETAMINOPHEN 500 MG: 500 TABLET ORAL at 09:07

## 2025-07-29 RX ADMIN — Medication 200 ML: at 10:07

## 2025-07-29 RX ADMIN — MEROPENEM 1 G: 1 INJECTION, POWDER, FOR SOLUTION INTRAVENOUS at 08:07

## 2025-07-29 RX ADMIN — MEROPENEM 1 G: 1 INJECTION, POWDER, FOR SOLUTION INTRAVENOUS at 01:07

## 2025-07-29 RX ADMIN — DILTIAZEM HYDROCHLORIDE 120 MG: 120 CAPSULE, COATED, EXTENDED RELEASE ORAL at 09:07

## 2025-07-29 RX ADMIN — Medication 1 CAPSULE: at 11:07

## 2025-07-29 RX ADMIN — Medication 200 ML: at 06:07

## 2025-07-29 RX ADMIN — Medication 1 CAPSULE: at 04:07

## 2025-07-29 RX ADMIN — DAPTOMYCIN 600 MG: 500 INJECTION, POWDER, LYOPHILIZED, FOR SOLUTION INTRAVENOUS at 03:07

## 2025-07-29 RX ADMIN — PANTOPRAZOLE SODIUM 40 MG: 40 TABLET, DELAYED RELEASE ORAL at 09:07

## 2025-07-29 RX ADMIN — MEROPENEM 1 G: 1 INJECTION, POWDER, FOR SOLUTION INTRAVENOUS at 05:07

## 2025-07-29 RX ADMIN — Medication 1 CAPSULE: at 08:07

## 2025-07-29 RX ADMIN — INSULIN ASPART 2 UNITS: 100 INJECTION, SOLUTION INTRAVENOUS; SUBCUTANEOUS at 04:07

## 2025-07-29 RX ADMIN — OXYBUTYNIN CHLORIDE 10 MG: 5 TABLET, EXTENDED RELEASE ORAL at 09:07

## 2025-07-29 RX ADMIN — INSULIN GLARGINE 60 UNITS: 100 INJECTION, SOLUTION SUBCUTANEOUS at 08:07

## 2025-07-30 PROCEDURE — 11000004 HC SNF PRIVATE

## 2025-07-30 PROCEDURE — 63600175 PHARM REV CODE 636 W HCPCS: Performed by: INTERNAL MEDICINE

## 2025-07-30 PROCEDURE — 27000207 HC ISOLATION

## 2025-07-30 PROCEDURE — 63600175 PHARM REV CODE 636 W HCPCS: Performed by: PHYSICIAN ASSISTANT

## 2025-07-30 PROCEDURE — 25000003 PHARM REV CODE 250: Performed by: FAMILY MEDICINE

## 2025-07-30 PROCEDURE — 25000003 PHARM REV CODE 250: Performed by: INTERNAL MEDICINE

## 2025-07-30 PROCEDURE — 25000003 PHARM REV CODE 250: Performed by: PHYSICIAN ASSISTANT

## 2025-07-30 RX ADMIN — MEROPENEM 1 G: 1 INJECTION, POWDER, FOR SOLUTION INTRAVENOUS at 04:07

## 2025-07-30 RX ADMIN — DAPTOMYCIN 600 MG: 500 INJECTION, POWDER, LYOPHILIZED, FOR SOLUTION INTRAVENOUS at 02:07

## 2025-07-30 RX ADMIN — INSULIN GLARGINE 60 UNITS: 100 INJECTION, SOLUTION SUBCUTANEOUS at 09:07

## 2025-07-30 RX ADMIN — MEROPENEM 1 G: 1 INJECTION, POWDER, FOR SOLUTION INTRAVENOUS at 09:07

## 2025-07-30 RX ADMIN — INSULIN ASPART 2 UNITS: 100 INJECTION, SOLUTION INTRAVENOUS; SUBCUTANEOUS at 05:07

## 2025-07-30 RX ADMIN — Medication 1 CAPSULE: at 12:07

## 2025-07-30 RX ADMIN — DILTIAZEM HYDROCHLORIDE 120 MG: 120 CAPSULE, COATED, EXTENDED RELEASE ORAL at 08:07

## 2025-07-30 RX ADMIN — Medication 200 ML: at 10:07

## 2025-07-30 RX ADMIN — Medication 200 ML: at 01:07

## 2025-07-30 RX ADMIN — MEROPENEM 1 G: 1 INJECTION, POWDER, FOR SOLUTION INTRAVENOUS at 01:07

## 2025-07-30 RX ADMIN — OXYCODONE HYDROCHLORIDE AND ACETAMINOPHEN 500 MG: 500 TABLET ORAL at 08:07

## 2025-07-30 RX ADMIN — SITAGLIPTIN 50 MG: 50 TABLET, FILM COATED ORAL at 08:07

## 2025-07-30 RX ADMIN — Medication 1 CAPSULE: at 08:07

## 2025-07-30 RX ADMIN — Medication 1 CAPSULE: at 05:07

## 2025-07-30 RX ADMIN — INSULIN ASPART 2 UNITS: 100 INJECTION, SOLUTION INTRAVENOUS; SUBCUTANEOUS at 09:07

## 2025-07-30 RX ADMIN — ZINC SULFATE 220 MG (50 MG) CAPSULE 220 MG: CAPSULE at 08:07

## 2025-07-30 RX ADMIN — OXYBUTYNIN CHLORIDE 10 MG: 5 TABLET, EXTENDED RELEASE ORAL at 08:07

## 2025-07-30 RX ADMIN — PANTOPRAZOLE SODIUM 40 MG: 40 TABLET, DELAYED RELEASE ORAL at 08:07

## 2025-07-30 RX ADMIN — Medication 200 ML: at 05:07

## 2025-07-30 RX ADMIN — ENOXAPARIN SODIUM 40 MG: 40 INJECTION SUBCUTANEOUS at 05:07

## 2025-07-31 LAB
GLUCOSE SERPL-MCNC: 105 MG/DL (ref 70–110)
GLUCOSE SERPL-MCNC: 152 MG/DL (ref 70–110)
GLUCOSE SERPL-MCNC: 160 MG/DL (ref 70–110)
POCT GLUCOSE: 105

## 2025-07-31 PROCEDURE — 63600175 PHARM REV CODE 636 W HCPCS: Performed by: INTERNAL MEDICINE

## 2025-07-31 PROCEDURE — 25000003 PHARM REV CODE 250: Performed by: INTERNAL MEDICINE

## 2025-07-31 PROCEDURE — 27000207 HC ISOLATION

## 2025-07-31 PROCEDURE — 25000003 PHARM REV CODE 250: Performed by: PHYSICIAN ASSISTANT

## 2025-07-31 PROCEDURE — 25000003 PHARM REV CODE 250: Performed by: FAMILY MEDICINE

## 2025-07-31 PROCEDURE — 63600175 PHARM REV CODE 636 W HCPCS: Performed by: PHYSICIAN ASSISTANT

## 2025-07-31 PROCEDURE — 11000004 HC SNF PRIVATE

## 2025-07-31 RX ADMIN — OXYBUTYNIN CHLORIDE 10 MG: 5 TABLET, EXTENDED RELEASE ORAL at 09:07

## 2025-07-31 RX ADMIN — Medication 200 ML: at 09:07

## 2025-07-31 RX ADMIN — DILTIAZEM HYDROCHLORIDE 120 MG: 120 CAPSULE, COATED, EXTENDED RELEASE ORAL at 09:07

## 2025-07-31 RX ADMIN — INSULIN ASPART 2 UNITS: 100 INJECTION, SOLUTION INTRAVENOUS; SUBCUTANEOUS at 09:07

## 2025-07-31 RX ADMIN — DAPTOMYCIN 600 MG: 500 INJECTION, POWDER, LYOPHILIZED, FOR SOLUTION INTRAVENOUS at 01:07

## 2025-07-31 RX ADMIN — PANTOPRAZOLE SODIUM 40 MG: 40 TABLET, DELAYED RELEASE ORAL at 09:07

## 2025-07-31 RX ADMIN — SITAGLIPTIN 50 MG: 50 TABLET, FILM COATED ORAL at 09:07

## 2025-07-31 RX ADMIN — Medication 200 ML: at 02:07

## 2025-07-31 RX ADMIN — ZINC SULFATE 220 MG (50 MG) CAPSULE 220 MG: CAPSULE at 09:07

## 2025-07-31 RX ADMIN — Medication 1 CAPSULE: at 05:07

## 2025-07-31 RX ADMIN — MEROPENEM 1 G: 1 INJECTION, POWDER, FOR SOLUTION INTRAVENOUS at 01:07

## 2025-07-31 RX ADMIN — Medication 200 ML: at 05:07

## 2025-07-31 RX ADMIN — INSULIN GLARGINE 60 UNITS: 100 INJECTION, SOLUTION SUBCUTANEOUS at 09:07

## 2025-07-31 RX ADMIN — ENOXAPARIN SODIUM 40 MG: 40 INJECTION SUBCUTANEOUS at 05:07

## 2025-07-31 RX ADMIN — OXYCODONE HYDROCHLORIDE AND ACETAMINOPHEN 500 MG: 500 TABLET ORAL at 09:07

## 2025-07-31 RX ADMIN — MEROPENEM 1 G: 1 INJECTION, POWDER, FOR SOLUTION INTRAVENOUS at 05:07

## 2025-07-31 RX ADMIN — Medication 1 CAPSULE: at 09:07

## 2025-07-31 RX ADMIN — MEROPENEM 1 G: 1 INJECTION, POWDER, FOR SOLUTION INTRAVENOUS at 09:07

## 2025-08-01 LAB — GLUCOSE SERPL-MCNC: 166 MG/DL (ref 70–110)

## 2025-08-01 PROCEDURE — 25000003 PHARM REV CODE 250: Performed by: PHYSICIAN ASSISTANT

## 2025-08-01 PROCEDURE — A4216 STERILE WATER/SALINE, 10 ML: HCPCS | Performed by: INTERNAL MEDICINE

## 2025-08-01 PROCEDURE — 25000003 PHARM REV CODE 250: Performed by: INTERNAL MEDICINE

## 2025-08-01 PROCEDURE — 11000004 HC SNF PRIVATE

## 2025-08-01 PROCEDURE — 25000003 PHARM REV CODE 250: Performed by: FAMILY MEDICINE

## 2025-08-01 PROCEDURE — 63600175 PHARM REV CODE 636 W HCPCS: Performed by: INTERNAL MEDICINE

## 2025-08-01 PROCEDURE — 27000207 HC ISOLATION

## 2025-08-01 PROCEDURE — 63600175 PHARM REV CODE 636 W HCPCS: Performed by: PHYSICIAN ASSISTANT

## 2025-08-01 RX ADMIN — DILTIAZEM HYDROCHLORIDE 120 MG: 120 CAPSULE, COATED, EXTENDED RELEASE ORAL at 09:08

## 2025-08-01 RX ADMIN — Medication 200 ML: at 06:08

## 2025-08-01 RX ADMIN — Medication 1 CAPSULE: at 05:08

## 2025-08-01 RX ADMIN — Medication 200 ML: at 09:08

## 2025-08-01 RX ADMIN — Medication 10 ML: at 08:08

## 2025-08-01 RX ADMIN — ZINC SULFATE 220 MG (50 MG) CAPSULE 220 MG: CAPSULE at 09:08

## 2025-08-01 RX ADMIN — SITAGLIPTIN 50 MG: 50 TABLET, FILM COATED ORAL at 09:08

## 2025-08-01 RX ADMIN — INSULIN GLARGINE 60 UNITS: 100 INJECTION, SOLUTION SUBCUTANEOUS at 08:08

## 2025-08-01 RX ADMIN — OXYCODONE HYDROCHLORIDE AND ACETAMINOPHEN 500 MG: 500 TABLET ORAL at 09:08

## 2025-08-01 RX ADMIN — MEROPENEM 1 G: 1 INJECTION, POWDER, FOR SOLUTION INTRAVENOUS at 12:08

## 2025-08-01 RX ADMIN — MEROPENEM 1 G: 1 INJECTION, POWDER, FOR SOLUTION INTRAVENOUS at 06:08

## 2025-08-01 RX ADMIN — OXYBUTYNIN CHLORIDE 10 MG: 5 TABLET, EXTENDED RELEASE ORAL at 09:08

## 2025-08-01 RX ADMIN — Medication 200 ML: at 05:08

## 2025-08-01 RX ADMIN — ENOXAPARIN SODIUM 40 MG: 40 INJECTION SUBCUTANEOUS at 05:08

## 2025-08-01 RX ADMIN — PANTOPRAZOLE SODIUM 40 MG: 40 TABLET, DELAYED RELEASE ORAL at 09:08

## 2025-08-01 RX ADMIN — Medication 1 CAPSULE: at 12:08

## 2025-08-01 RX ADMIN — MEROPENEM 1 G: 1 INJECTION, POWDER, FOR SOLUTION INTRAVENOUS at 08:08

## 2025-08-01 RX ADMIN — Medication 1 CAPSULE: at 09:08

## 2025-08-01 RX ADMIN — DAPTOMYCIN 600 MG: 500 INJECTION, POWDER, LYOPHILIZED, FOR SOLUTION INTRAVENOUS at 12:08

## 2025-08-02 LAB
GLUCOSE SERPL-MCNC: 110 MG/DL (ref 70–110)
GLUCOSE SERPL-MCNC: 114 MG/DL (ref 70–110)
GLUCOSE SERPL-MCNC: 119 MG/DL (ref 70–110)
GLUCOSE SERPL-MCNC: 202 MG/DL (ref 70–110)
GLUCOSE SERPL-MCNC: 70 MG/DL (ref 70–110)

## 2025-08-02 PROCEDURE — 63600175 PHARM REV CODE 636 W HCPCS: Performed by: INTERNAL MEDICINE

## 2025-08-02 PROCEDURE — 27000207 HC ISOLATION

## 2025-08-02 PROCEDURE — 25000003 PHARM REV CODE 250: Performed by: INTERNAL MEDICINE

## 2025-08-02 PROCEDURE — A4216 STERILE WATER/SALINE, 10 ML: HCPCS | Performed by: INTERNAL MEDICINE

## 2025-08-02 PROCEDURE — 63600175 PHARM REV CODE 636 W HCPCS: Performed by: PHYSICIAN ASSISTANT

## 2025-08-02 PROCEDURE — 11000004 HC SNF PRIVATE

## 2025-08-02 PROCEDURE — 25000003 PHARM REV CODE 250: Performed by: FAMILY MEDICINE

## 2025-08-02 PROCEDURE — 25000003 PHARM REV CODE 250: Performed by: PHYSICIAN ASSISTANT

## 2025-08-02 RX ADMIN — Medication 10 ML: at 05:08

## 2025-08-02 RX ADMIN — Medication 200 ML: at 06:08

## 2025-08-02 RX ADMIN — MEROPENEM 1 G: 1 INJECTION, POWDER, FOR SOLUTION INTRAVENOUS at 12:08

## 2025-08-02 RX ADMIN — INSULIN GLARGINE 60 UNITS: 100 INJECTION, SOLUTION SUBCUTANEOUS at 08:08

## 2025-08-02 RX ADMIN — Medication 1 CAPSULE: at 09:08

## 2025-08-02 RX ADMIN — DAPTOMYCIN 600 MG: 500 INJECTION, POWDER, LYOPHILIZED, FOR SOLUTION INTRAVENOUS at 01:08

## 2025-08-02 RX ADMIN — ZINC SULFATE 220 MG (50 MG) CAPSULE 220 MG: CAPSULE at 09:08

## 2025-08-02 RX ADMIN — ONDANSETRON 4 MG: 4 TABLET, ORALLY DISINTEGRATING ORAL at 01:08

## 2025-08-02 RX ADMIN — Medication 10 ML: at 08:08

## 2025-08-02 RX ADMIN — SITAGLIPTIN 50 MG: 50 TABLET, FILM COATED ORAL at 09:08

## 2025-08-02 RX ADMIN — OXYCODONE HYDROCHLORIDE AND ACETAMINOPHEN 500 MG: 500 TABLET ORAL at 09:08

## 2025-08-02 RX ADMIN — DILTIAZEM HYDROCHLORIDE 120 MG: 120 CAPSULE, COATED, EXTENDED RELEASE ORAL at 09:08

## 2025-08-02 RX ADMIN — MEROPENEM 1 G: 1 INJECTION, POWDER, FOR SOLUTION INTRAVENOUS at 08:08

## 2025-08-02 RX ADMIN — Medication 1 CAPSULE: at 04:08

## 2025-08-02 RX ADMIN — ENOXAPARIN SODIUM 40 MG: 40 INJECTION SUBCUTANEOUS at 04:08

## 2025-08-02 RX ADMIN — Medication 200 ML: at 09:08

## 2025-08-02 RX ADMIN — Medication 200 ML: at 03:08

## 2025-08-02 RX ADMIN — MEROPENEM 1 G: 1 INJECTION, POWDER, FOR SOLUTION INTRAVENOUS at 05:08

## 2025-08-02 RX ADMIN — Medication 200 ML: at 01:08

## 2025-08-02 RX ADMIN — PANTOPRAZOLE SODIUM 40 MG: 40 TABLET, DELAYED RELEASE ORAL at 09:08

## 2025-08-02 RX ADMIN — Medication 1 CAPSULE: at 01:08

## 2025-08-02 RX ADMIN — OXYBUTYNIN CHLORIDE 10 MG: 5 TABLET, EXTENDED RELEASE ORAL at 09:08

## 2025-08-03 LAB
GLUCOSE SERPL-MCNC: 157 MG/DL (ref 70–110)
GLUCOSE SERPL-MCNC: 186 MG/DL (ref 70–110)
GLUCOSE SERPL-MCNC: 82 MG/DL (ref 70–110)
GLUCOSE SERPL-MCNC: 91 MG/DL (ref 70–110)

## 2025-08-03 PROCEDURE — 25000003 PHARM REV CODE 250: Performed by: PHYSICIAN ASSISTANT

## 2025-08-03 PROCEDURE — 25000003 PHARM REV CODE 250: Performed by: INTERNAL MEDICINE

## 2025-08-03 PROCEDURE — 27000207 HC ISOLATION

## 2025-08-03 PROCEDURE — 63600175 PHARM REV CODE 636 W HCPCS: Performed by: PHYSICIAN ASSISTANT

## 2025-08-03 PROCEDURE — 11000004 HC SNF PRIVATE

## 2025-08-03 PROCEDURE — 25000003 PHARM REV CODE 250: Performed by: FAMILY MEDICINE

## 2025-08-03 PROCEDURE — A4216 STERILE WATER/SALINE, 10 ML: HCPCS | Performed by: INTERNAL MEDICINE

## 2025-08-03 PROCEDURE — 63600175 PHARM REV CODE 636 W HCPCS: Performed by: INTERNAL MEDICINE

## 2025-08-03 RX ADMIN — SITAGLIPTIN 50 MG: 50 TABLET, FILM COATED ORAL at 09:08

## 2025-08-03 RX ADMIN — Medication 10 ML: at 09:08

## 2025-08-03 RX ADMIN — Medication 200 ML: at 06:08

## 2025-08-03 RX ADMIN — INSULIN ASPART 1 UNITS: 100 INJECTION, SOLUTION INTRAVENOUS; SUBCUTANEOUS at 09:08

## 2025-08-03 RX ADMIN — PANTOPRAZOLE SODIUM 40 MG: 40 TABLET, DELAYED RELEASE ORAL at 09:08

## 2025-08-03 RX ADMIN — Medication 10 ML: at 05:08

## 2025-08-03 RX ADMIN — OXYCODONE HYDROCHLORIDE AND ACETAMINOPHEN 500 MG: 500 TABLET ORAL at 09:08

## 2025-08-03 RX ADMIN — Medication 200 ML: at 02:08

## 2025-08-03 RX ADMIN — DILTIAZEM HYDROCHLORIDE 120 MG: 120 CAPSULE, COATED, EXTENDED RELEASE ORAL at 09:08

## 2025-08-03 RX ADMIN — Medication 1 CAPSULE: at 05:08

## 2025-08-03 RX ADMIN — DAPTOMYCIN 600 MG: 500 INJECTION, POWDER, LYOPHILIZED, FOR SOLUTION INTRAVENOUS at 12:08

## 2025-08-03 RX ADMIN — ZINC SULFATE 220 MG (50 MG) CAPSULE 220 MG: CAPSULE at 09:08

## 2025-08-03 RX ADMIN — Medication 1 CAPSULE: at 12:08

## 2025-08-03 RX ADMIN — ENOXAPARIN SODIUM 40 MG: 40 INJECTION SUBCUTANEOUS at 05:08

## 2025-08-03 RX ADMIN — Medication 10 ML: at 06:08

## 2025-08-03 RX ADMIN — INSULIN GLARGINE 60 UNITS: 100 INJECTION, SOLUTION SUBCUTANEOUS at 09:08

## 2025-08-03 RX ADMIN — Medication 200 ML: at 05:08

## 2025-08-03 RX ADMIN — Medication 1 CAPSULE: at 09:08

## 2025-08-03 RX ADMIN — Medication 200 ML: at 09:08

## 2025-08-03 RX ADMIN — MEROPENEM 1 G: 1 INJECTION, POWDER, FOR SOLUTION INTRAVENOUS at 12:08

## 2025-08-03 RX ADMIN — Medication 200 ML: at 03:08

## 2025-08-03 RX ADMIN — MEROPENEM 1 G: 1 INJECTION, POWDER, FOR SOLUTION INTRAVENOUS at 05:08

## 2025-08-03 RX ADMIN — Medication 200 ML: at 12:08

## 2025-08-03 RX ADMIN — MEROPENEM 1 G: 1 INJECTION, POWDER, FOR SOLUTION INTRAVENOUS at 09:08

## 2025-08-03 RX ADMIN — OXYBUTYNIN CHLORIDE 10 MG: 5 TABLET, EXTENDED RELEASE ORAL at 09:08

## 2025-08-04 LAB
ALBUMIN SERPL-MCNC: 2.4 G/DL (ref 3.5–5)
ALBUMIN/GLOB SERPL: 0.6 RATIO (ref 1.1–2)
ALP SERPL-CCNC: 103 UNIT/L (ref 40–150)
ALT SERPL-CCNC: 26 UNIT/L (ref 0–55)
ANION GAP SERPL CALC-SCNC: 7 MEQ/L
AST SERPL-CCNC: 16 UNIT/L (ref 11–45)
BASOPHILS # BLD AUTO: 0.03 X10(3)/MCL
BASOPHILS NFR BLD AUTO: 0.5 %
BILIRUB SERPL-MCNC: 0.3 MG/DL
BUN SERPL-MCNC: 60.5 MG/DL (ref 8.4–25.7)
CALCIUM SERPL-MCNC: 8.7 MG/DL (ref 8.4–10.2)
CHLORIDE SERPL-SCNC: 109 MMOL/L (ref 98–107)
CK SERPL-CCNC: 24 U/L (ref 30–200)
CO2 SERPL-SCNC: 23 MMOL/L (ref 22–29)
CREAT SERPL-MCNC: 0.85 MG/DL (ref 0.72–1.25)
CREAT/UREA NIT SERPL: 71
EOSINOPHIL # BLD AUTO: 0.3 X10(3)/MCL (ref 0–0.9)
EOSINOPHIL NFR BLD AUTO: 4.8 %
ERYTHROCYTE [DISTWIDTH] IN BLOOD BY AUTOMATED COUNT: 19.4 % (ref 11.5–17)
GFR SERPLBLD CREATININE-BSD FMLA CKD-EPI: >60 ML/MIN/1.73/M2
GLOBULIN SER-MCNC: 3.8 GM/DL (ref 2.4–3.5)
GLUCOSE SERPL-MCNC: 104 MG/DL (ref 74–100)
GLUCOSE SERPL-MCNC: 173 MG/DL (ref 70–110)
HCT VFR BLD AUTO: 29.8 % (ref 42–52)
HGB BLD-MCNC: 9.3 G/DL (ref 14–18)
IMM GRANULOCYTES # BLD AUTO: 0.01 X10(3)/MCL (ref 0–0.04)
IMM GRANULOCYTES NFR BLD AUTO: 0.2 %
LYMPHOCYTES # BLD AUTO: 2.76 X10(3)/MCL (ref 0.6–4.6)
LYMPHOCYTES NFR BLD AUTO: 44 %
MCH RBC QN AUTO: 26.9 PG (ref 27–31)
MCHC RBC AUTO-ENTMCNC: 31.2 G/DL (ref 33–36)
MCV RBC AUTO: 86.1 FL (ref 80–94)
MONOCYTES # BLD AUTO: 0.46 X10(3)/MCL (ref 0.1–1.3)
MONOCYTES NFR BLD AUTO: 7.3 %
NEUTROPHILS # BLD AUTO: 2.71 X10(3)/MCL (ref 2.1–9.2)
NEUTROPHILS NFR BLD AUTO: 43.2 %
PLATELET # BLD AUTO: 232 X10(3)/MCL (ref 130–400)
PMV BLD AUTO: 9.8 FL (ref 7.4–10.4)
POCT GLUCOSE: 113 MG/DL (ref 70–110)
POCT GLUCOSE: 169 MG/DL (ref 70–110)
POTASSIUM SERPL-SCNC: 5.4 MMOL/L (ref 3.5–5.1)
PROT SERPL-MCNC: 6.2 GM/DL (ref 6.4–8.3)
RBC # BLD AUTO: 3.46 X10(6)/MCL (ref 4.7–6.1)
SODIUM SERPL-SCNC: 139 MMOL/L (ref 136–145)
WBC # BLD AUTO: 6.27 X10(3)/MCL (ref 4.5–11.5)

## 2025-08-04 PROCEDURE — 36415 COLL VENOUS BLD VENIPUNCTURE: CPT | Performed by: PHYSICIAN ASSISTANT

## 2025-08-04 PROCEDURE — 80053 COMPREHEN METABOLIC PANEL: CPT | Performed by: PHYSICIAN ASSISTANT

## 2025-08-04 PROCEDURE — 11000004 HC SNF PRIVATE

## 2025-08-04 PROCEDURE — 63600175 PHARM REV CODE 636 W HCPCS: Performed by: PHYSICIAN ASSISTANT

## 2025-08-04 PROCEDURE — 25000003 PHARM REV CODE 250: Performed by: FAMILY MEDICINE

## 2025-08-04 PROCEDURE — 85025 COMPLETE CBC W/AUTO DIFF WBC: CPT | Performed by: PHYSICIAN ASSISTANT

## 2025-08-04 PROCEDURE — 27000207 HC ISOLATION

## 2025-08-04 PROCEDURE — 63600175 PHARM REV CODE 636 W HCPCS: Performed by: INTERNAL MEDICINE

## 2025-08-04 PROCEDURE — 25000003 PHARM REV CODE 250: Performed by: PHYSICIAN ASSISTANT

## 2025-08-04 PROCEDURE — 25000003 PHARM REV CODE 250: Performed by: INTERNAL MEDICINE

## 2025-08-04 PROCEDURE — 82550 ASSAY OF CK (CPK): CPT | Performed by: INTERNAL MEDICINE

## 2025-08-04 PROCEDURE — A4216 STERILE WATER/SALINE, 10 ML: HCPCS | Performed by: INTERNAL MEDICINE

## 2025-08-04 RX ADMIN — Medication 200 ML: at 03:08

## 2025-08-04 RX ADMIN — DILTIAZEM HYDROCHLORIDE 120 MG: 120 CAPSULE, COATED, EXTENDED RELEASE ORAL at 08:08

## 2025-08-04 RX ADMIN — OXYBUTYNIN CHLORIDE 10 MG: 5 TABLET, EXTENDED RELEASE ORAL at 08:08

## 2025-08-04 RX ADMIN — MEROPENEM 1 G: 1 INJECTION, POWDER, FOR SOLUTION INTRAVENOUS at 12:08

## 2025-08-04 RX ADMIN — INSULIN ASPART 2 UNITS: 100 INJECTION, SOLUTION INTRAVENOUS; SUBCUTANEOUS at 04:08

## 2025-08-04 RX ADMIN — ENOXAPARIN SODIUM 40 MG: 40 INJECTION SUBCUTANEOUS at 04:08

## 2025-08-04 RX ADMIN — INSULIN GLARGINE 60 UNITS: 100 INJECTION, SOLUTION SUBCUTANEOUS at 09:08

## 2025-08-04 RX ADMIN — Medication 1 CAPSULE: at 08:08

## 2025-08-04 RX ADMIN — PANTOPRAZOLE SODIUM 40 MG: 40 TABLET, DELAYED RELEASE ORAL at 08:08

## 2025-08-04 RX ADMIN — Medication 200 ML: at 05:08

## 2025-08-04 RX ADMIN — DAPTOMYCIN 600 MG: 500 INJECTION, POWDER, LYOPHILIZED, FOR SOLUTION INTRAVENOUS at 12:08

## 2025-08-04 RX ADMIN — SODIUM ZIRCONIUM CYCLOSILICATE 10 G: 10 POWDER, FOR SUSPENSION ORAL at 11:08

## 2025-08-04 RX ADMIN — MEROPENEM 1 G: 1 INJECTION, POWDER, FOR SOLUTION INTRAVENOUS at 05:08

## 2025-08-04 RX ADMIN — Medication 1 CAPSULE: at 11:08

## 2025-08-04 RX ADMIN — SITAGLIPTIN 50 MG: 50 TABLET, FILM COATED ORAL at 08:08

## 2025-08-04 RX ADMIN — Medication 10 ML: at 05:08

## 2025-08-04 RX ADMIN — Medication 200 ML: at 06:08

## 2025-08-04 RX ADMIN — Medication 200 ML: at 12:08

## 2025-08-04 RX ADMIN — Medication 200 ML: at 02:08

## 2025-08-04 RX ADMIN — MEROPENEM 1 G: 1 INJECTION, POWDER, FOR SOLUTION INTRAVENOUS at 09:08

## 2025-08-04 RX ADMIN — OXYCODONE HYDROCHLORIDE AND ACETAMINOPHEN 500 MG: 500 TABLET ORAL at 08:08

## 2025-08-04 RX ADMIN — ZINC SULFATE 220 MG (50 MG) CAPSULE 220 MG: CAPSULE at 08:08

## 2025-08-04 RX ADMIN — Medication 1 CAPSULE: at 04:08

## 2025-08-05 LAB — GLUCOSE SERPL-MCNC: 268 MG/DL (ref 70–110)

## 2025-08-05 PROCEDURE — 63600175 PHARM REV CODE 636 W HCPCS: Performed by: INTERNAL MEDICINE

## 2025-08-05 PROCEDURE — 11000004 HC SNF PRIVATE

## 2025-08-05 PROCEDURE — 27000207 HC ISOLATION

## 2025-08-05 PROCEDURE — 63600175 PHARM REV CODE 636 W HCPCS: Performed by: PHYSICIAN ASSISTANT

## 2025-08-05 PROCEDURE — 25000003 PHARM REV CODE 250: Performed by: INTERNAL MEDICINE

## 2025-08-05 PROCEDURE — 25000003 PHARM REV CODE 250: Performed by: FAMILY MEDICINE

## 2025-08-05 PROCEDURE — 25000003 PHARM REV CODE 250: Performed by: PHYSICIAN ASSISTANT

## 2025-08-05 RX ADMIN — Medication 200 ML: at 02:08

## 2025-08-05 RX ADMIN — ZINC SULFATE 220 MG (50 MG) CAPSULE 220 MG: CAPSULE at 09:08

## 2025-08-05 RX ADMIN — SITAGLIPTIN 50 MG: 50 TABLET, FILM COATED ORAL at 09:08

## 2025-08-05 RX ADMIN — ALTEPLASE 2 MG: 2.2 INJECTION, POWDER, LYOPHILIZED, FOR SOLUTION INTRAVENOUS at 02:08

## 2025-08-05 RX ADMIN — Medication 200 ML: at 06:08

## 2025-08-05 RX ADMIN — Medication 1 CAPSULE: at 05:08

## 2025-08-05 RX ADMIN — INSULIN GLARGINE 60 UNITS: 100 INJECTION, SOLUTION SUBCUTANEOUS at 09:08

## 2025-08-05 RX ADMIN — DAPTOMYCIN 600 MG: 500 INJECTION, POWDER, LYOPHILIZED, FOR SOLUTION INTRAVENOUS at 02:08

## 2025-08-05 RX ADMIN — INSULIN ASPART 4 UNITS: 100 INJECTION, SOLUTION INTRAVENOUS; SUBCUTANEOUS at 05:08

## 2025-08-05 RX ADMIN — Medication 200 ML: at 10:08

## 2025-08-05 RX ADMIN — MEROPENEM 1 G: 1 INJECTION, POWDER, FOR SOLUTION INTRAVENOUS at 05:08

## 2025-08-05 RX ADMIN — Medication 1 CAPSULE: at 09:08

## 2025-08-05 RX ADMIN — DILTIAZEM HYDROCHLORIDE 120 MG: 120 CAPSULE, COATED, EXTENDED RELEASE ORAL at 09:08

## 2025-08-05 RX ADMIN — INSULIN ASPART 3 UNITS: 100 INJECTION, SOLUTION INTRAVENOUS; SUBCUTANEOUS at 09:08

## 2025-08-05 RX ADMIN — Medication 1 CAPSULE: at 11:08

## 2025-08-05 RX ADMIN — Medication 200 ML: at 05:08

## 2025-08-05 RX ADMIN — OXYCODONE HYDROCHLORIDE AND ACETAMINOPHEN 500 MG: 500 TABLET ORAL at 09:08

## 2025-08-05 RX ADMIN — ENOXAPARIN SODIUM 40 MG: 40 INJECTION SUBCUTANEOUS at 05:08

## 2025-08-05 RX ADMIN — MEROPENEM 1 G: 1 INJECTION, POWDER, FOR SOLUTION INTRAVENOUS at 09:08

## 2025-08-05 RX ADMIN — OXYBUTYNIN CHLORIDE 10 MG: 5 TABLET, EXTENDED RELEASE ORAL at 09:08

## 2025-08-05 RX ADMIN — Medication 200 ML: at 09:08

## 2025-08-05 RX ADMIN — MEROPENEM 1 G: 1 INJECTION, POWDER, FOR SOLUTION INTRAVENOUS at 02:08

## 2025-08-05 RX ADMIN — PANTOPRAZOLE SODIUM 40 MG: 40 TABLET, DELAYED RELEASE ORAL at 09:08

## 2025-08-06 LAB
ANION GAP SERPL CALC-SCNC: 7 MEQ/L
BUN SERPL-MCNC: 59.3 MG/DL (ref 8.4–25.7)
CALCIUM SERPL-MCNC: 8.5 MG/DL (ref 8.4–10.2)
CHLORIDE SERPL-SCNC: 110 MMOL/L (ref 98–107)
CO2 SERPL-SCNC: 23 MMOL/L (ref 22–29)
CREAT SERPL-MCNC: 0.79 MG/DL (ref 0.72–1.25)
CREAT/UREA NIT SERPL: 75
GFR SERPLBLD CREATININE-BSD FMLA CKD-EPI: >60 ML/MIN/1.73/M2
GLUCOSE SERPL-MCNC: 139 MG/DL (ref 70–110)
GLUCOSE SERPL-MCNC: 171 MG/DL (ref 70–110)
GLUCOSE SERPL-MCNC: 253 MG/DL (ref 70–110)
GLUCOSE SERPL-MCNC: 65 MG/DL (ref 74–100)
GLUCOSE SERPL-MCNC: 76 MG/DL (ref 70–110)
POTASSIUM SERPL-SCNC: 4.5 MMOL/L (ref 3.5–5.1)
SODIUM SERPL-SCNC: 140 MMOL/L (ref 136–145)

## 2025-08-06 PROCEDURE — 63600175 PHARM REV CODE 636 W HCPCS: Performed by: INTERNAL MEDICINE

## 2025-08-06 PROCEDURE — 36415 COLL VENOUS BLD VENIPUNCTURE: CPT | Performed by: PHYSICIAN ASSISTANT

## 2025-08-06 PROCEDURE — 25000003 PHARM REV CODE 250: Performed by: INTERNAL MEDICINE

## 2025-08-06 PROCEDURE — 11000004 HC SNF PRIVATE

## 2025-08-06 PROCEDURE — 25000003 PHARM REV CODE 250: Performed by: FAMILY MEDICINE

## 2025-08-06 PROCEDURE — A4216 STERILE WATER/SALINE, 10 ML: HCPCS | Performed by: INTERNAL MEDICINE

## 2025-08-06 PROCEDURE — 63600175 PHARM REV CODE 636 W HCPCS: Performed by: PHYSICIAN ASSISTANT

## 2025-08-06 PROCEDURE — 27000207 HC ISOLATION

## 2025-08-06 PROCEDURE — 80048 BASIC METABOLIC PNL TOTAL CA: CPT | Performed by: PHYSICIAN ASSISTANT

## 2025-08-06 PROCEDURE — 25000003 PHARM REV CODE 250: Performed by: PHYSICIAN ASSISTANT

## 2025-08-06 RX ADMIN — INSULIN ASPART 3 UNITS: 100 INJECTION, SOLUTION INTRAVENOUS; SUBCUTANEOUS at 09:08

## 2025-08-06 RX ADMIN — MEROPENEM 1 G: 1 INJECTION, POWDER, FOR SOLUTION INTRAVENOUS at 01:08

## 2025-08-06 RX ADMIN — MEROPENEM 1 G: 1 INJECTION, POWDER, FOR SOLUTION INTRAVENOUS at 06:08

## 2025-08-06 RX ADMIN — Medication 1 CAPSULE: at 01:08

## 2025-08-06 RX ADMIN — OXYCODONE HYDROCHLORIDE AND ACETAMINOPHEN 500 MG: 500 TABLET ORAL at 08:08

## 2025-08-06 RX ADMIN — Medication 1 CAPSULE: at 08:08

## 2025-08-06 RX ADMIN — Medication 10 ML: at 08:08

## 2025-08-06 RX ADMIN — Medication 200 ML: at 06:08

## 2025-08-06 RX ADMIN — ZINC SULFATE 220 MG (50 MG) CAPSULE 220 MG: CAPSULE at 08:08

## 2025-08-06 RX ADMIN — PANTOPRAZOLE SODIUM 40 MG: 40 TABLET, DELAYED RELEASE ORAL at 08:08

## 2025-08-06 RX ADMIN — DAPTOMYCIN 600 MG: 500 INJECTION, POWDER, LYOPHILIZED, FOR SOLUTION INTRAVENOUS at 01:08

## 2025-08-06 RX ADMIN — DILTIAZEM HYDROCHLORIDE 120 MG: 120 CAPSULE, COATED, EXTENDED RELEASE ORAL at 08:08

## 2025-08-06 RX ADMIN — Medication 200 ML: at 02:08

## 2025-08-06 RX ADMIN — MEROPENEM 1 G: 1 INJECTION, POWDER, FOR SOLUTION INTRAVENOUS at 08:08

## 2025-08-06 RX ADMIN — Medication 1 CAPSULE: at 05:08

## 2025-08-06 RX ADMIN — ENOXAPARIN SODIUM 40 MG: 40 INJECTION SUBCUTANEOUS at 05:08

## 2025-08-06 RX ADMIN — INSULIN GLARGINE 60 UNITS: 100 INJECTION, SOLUTION SUBCUTANEOUS at 09:08

## 2025-08-06 RX ADMIN — OXYBUTYNIN CHLORIDE 10 MG: 5 TABLET, EXTENDED RELEASE ORAL at 08:08

## 2025-08-06 RX ADMIN — SITAGLIPTIN 50 MG: 50 TABLET, FILM COATED ORAL at 08:08

## 2025-08-07 LAB
GLUCOSE SERPL-MCNC: 146 MG/DL (ref 70–110)
GLUCOSE SERPL-MCNC: 215 MG/DL (ref 70–110)
GLUCOSE SERPL-MCNC: 95 MG/DL (ref 70–110)

## 2025-08-07 PROCEDURE — 25000003 PHARM REV CODE 250: Performed by: INTERNAL MEDICINE

## 2025-08-07 PROCEDURE — 63600175 PHARM REV CODE 636 W HCPCS: Performed by: INTERNAL MEDICINE

## 2025-08-07 PROCEDURE — 27000207 HC ISOLATION

## 2025-08-07 PROCEDURE — 25000003 PHARM REV CODE 250: Performed by: PHYSICIAN ASSISTANT

## 2025-08-07 PROCEDURE — 11000004 HC SNF PRIVATE

## 2025-08-07 PROCEDURE — A4216 STERILE WATER/SALINE, 10 ML: HCPCS | Performed by: INTERNAL MEDICINE

## 2025-08-07 PROCEDURE — 25000003 PHARM REV CODE 250: Performed by: FAMILY MEDICINE

## 2025-08-07 RX ADMIN — Medication 10 ML: at 07:08

## 2025-08-07 RX ADMIN — ZINC SULFATE 220 MG (50 MG) CAPSULE 220 MG: CAPSULE at 10:08

## 2025-08-07 RX ADMIN — MEROPENEM 1 G: 1 INJECTION, POWDER, FOR SOLUTION INTRAVENOUS at 04:08

## 2025-08-07 RX ADMIN — SITAGLIPTIN 50 MG: 50 TABLET, FILM COATED ORAL at 10:08

## 2025-08-07 RX ADMIN — Medication 1 CAPSULE: at 10:08

## 2025-08-07 RX ADMIN — OXYBUTYNIN CHLORIDE 10 MG: 5 TABLET, EXTENDED RELEASE ORAL at 10:08

## 2025-08-07 RX ADMIN — ENOXAPARIN SODIUM 40 MG: 40 INJECTION SUBCUTANEOUS at 05:08

## 2025-08-07 RX ADMIN — OXYCODONE HYDROCHLORIDE AND ACETAMINOPHEN 500 MG: 500 TABLET ORAL at 10:08

## 2025-08-07 RX ADMIN — Medication 1 CAPSULE: at 01:08

## 2025-08-07 RX ADMIN — DAPTOMYCIN 600 MG: 500 INJECTION, POWDER, LYOPHILIZED, FOR SOLUTION INTRAVENOUS at 01:08

## 2025-08-07 RX ADMIN — Medication 1 CAPSULE: at 05:08

## 2025-08-07 RX ADMIN — MEROPENEM 1 G: 1 INJECTION, POWDER, FOR SOLUTION INTRAVENOUS at 02:08

## 2025-08-07 RX ADMIN — INSULIN ASPART 4 UNITS: 100 INJECTION, SOLUTION INTRAVENOUS; SUBCUTANEOUS at 05:08

## 2025-08-07 RX ADMIN — DILTIAZEM HYDROCHLORIDE 120 MG: 120 CAPSULE, COATED, EXTENDED RELEASE ORAL at 10:08

## 2025-08-07 RX ADMIN — PANTOPRAZOLE SODIUM 40 MG: 40 TABLET, DELAYED RELEASE ORAL at 10:08

## 2025-08-07 RX ADMIN — MEROPENEM 1 G: 1 INJECTION, POWDER, FOR SOLUTION INTRAVENOUS at 08:08

## 2025-08-08 LAB
GLUCOSE SERPL-MCNC: 168 MG/DL (ref 70–110)
GLUCOSE SERPL-MCNC: 75 MG/DL (ref 70–110)
POCT GLUCOSE: 101 MG/DL (ref 70–110)
POCT GLUCOSE: 194 MG/DL (ref 70–110)
POCT GLUCOSE: 75 MG/DL (ref 70–110)

## 2025-08-08 PROCEDURE — 25000003 PHARM REV CODE 250: Performed by: FAMILY MEDICINE

## 2025-08-08 PROCEDURE — 25000003 PHARM REV CODE 250: Performed by: INTERNAL MEDICINE

## 2025-08-08 PROCEDURE — 11000004 HC SNF PRIVATE

## 2025-08-08 PROCEDURE — 63600175 PHARM REV CODE 636 W HCPCS: Performed by: INTERNAL MEDICINE

## 2025-08-08 PROCEDURE — 63600175 PHARM REV CODE 636 W HCPCS: Performed by: PHYSICIAN ASSISTANT

## 2025-08-08 PROCEDURE — 27000207 HC ISOLATION

## 2025-08-08 PROCEDURE — 25000003 PHARM REV CODE 250: Performed by: PHYSICIAN ASSISTANT

## 2025-08-08 RX ORDER — LIDOCAINE 50 MG/G
1 PATCH TOPICAL
Status: DISCONTINUED | OUTPATIENT
Start: 2025-08-08 | End: 2025-08-21 | Stop reason: HOSPADM

## 2025-08-08 RX ORDER — DICLOFENAC SODIUM 10 MG/G
2 GEL TOPICAL 2 TIMES DAILY
Status: DISCONTINUED | OUTPATIENT
Start: 2025-08-08 | End: 2025-08-21 | Stop reason: HOSPADM

## 2025-08-08 RX ADMIN — OXYCODONE HYDROCHLORIDE AND ACETAMINOPHEN 500 MG: 500 TABLET ORAL at 08:08

## 2025-08-08 RX ADMIN — Medication 1 CAPSULE: at 04:08

## 2025-08-08 RX ADMIN — Medication 1 CAPSULE: at 12:08

## 2025-08-08 RX ADMIN — MEROPENEM 1 G: 1 INJECTION, POWDER, FOR SOLUTION INTRAVENOUS at 12:08

## 2025-08-08 RX ADMIN — DILTIAZEM HYDROCHLORIDE 120 MG: 120 CAPSULE, COATED, EXTENDED RELEASE ORAL at 08:08

## 2025-08-08 RX ADMIN — ENOXAPARIN SODIUM 40 MG: 40 INJECTION SUBCUTANEOUS at 04:08

## 2025-08-08 RX ADMIN — Medication 1 CAPSULE: at 08:08

## 2025-08-08 RX ADMIN — OXYBUTYNIN CHLORIDE 10 MG: 5 TABLET, EXTENDED RELEASE ORAL at 08:08

## 2025-08-08 RX ADMIN — TRAMADOL HYDROCHLORIDE 50 MG: 50 TABLET, COATED ORAL at 06:08

## 2025-08-08 RX ADMIN — DICLOFENAC SODIUM 2 G: 10 GEL TOPICAL at 10:08

## 2025-08-08 RX ADMIN — MEROPENEM 1 G: 1 INJECTION, POWDER, FOR SOLUTION INTRAVENOUS at 06:08

## 2025-08-08 RX ADMIN — ZINC SULFATE 220 MG (50 MG) CAPSULE 220 MG: CAPSULE at 08:08

## 2025-08-08 RX ADMIN — PANTOPRAZOLE SODIUM 40 MG: 40 TABLET, DELAYED RELEASE ORAL at 08:08

## 2025-08-08 RX ADMIN — INSULIN GLARGINE 60 UNITS: 100 INJECTION, SOLUTION SUBCUTANEOUS at 09:08

## 2025-08-08 RX ADMIN — SITAGLIPTIN 50 MG: 50 TABLET, FILM COATED ORAL at 08:08

## 2025-08-08 RX ADMIN — MEROPENEM 1 G: 1 INJECTION, POWDER, FOR SOLUTION INTRAVENOUS at 09:08

## 2025-08-08 RX ADMIN — DAPTOMYCIN 600 MG: 500 INJECTION, POWDER, LYOPHILIZED, FOR SOLUTION INTRAVENOUS at 12:08

## 2025-08-09 LAB
GLUCOSE SERPL-MCNC: 171 MG/DL (ref 70–110)
GLUCOSE SERPL-MCNC: 270 MG/DL (ref 70–110)
GLUCOSE SERPL-MCNC: 80 MG/DL (ref 70–110)
GLUCOSE SERPL-MCNC: 89 MG/DL (ref 70–110)
POCT GLUCOSE: 80 MG/DL (ref 70–110)

## 2025-08-09 PROCEDURE — 25000003 PHARM REV CODE 250: Performed by: FAMILY MEDICINE

## 2025-08-09 PROCEDURE — 25000003 PHARM REV CODE 250: Performed by: PHYSICIAN ASSISTANT

## 2025-08-09 PROCEDURE — 11000004 HC SNF PRIVATE

## 2025-08-09 PROCEDURE — 25000003 PHARM REV CODE 250: Performed by: INTERNAL MEDICINE

## 2025-08-09 PROCEDURE — 27000207 HC ISOLATION

## 2025-08-09 PROCEDURE — 63600175 PHARM REV CODE 636 W HCPCS: Performed by: INTERNAL MEDICINE

## 2025-08-09 RX ORDER — INSULIN GLARGINE 100 [IU]/ML
30 INJECTION, SOLUTION SUBCUTANEOUS NIGHTLY
Status: DISCONTINUED | OUTPATIENT
Start: 2025-08-09 | End: 2025-08-10

## 2025-08-09 RX ADMIN — OXYCODONE HYDROCHLORIDE AND ACETAMINOPHEN 500 MG: 500 TABLET ORAL at 09:08

## 2025-08-09 RX ADMIN — Medication 1 CAPSULE: at 04:08

## 2025-08-09 RX ADMIN — DICLOFENAC SODIUM 2 G: 10 GEL TOPICAL at 09:08

## 2025-08-09 RX ADMIN — ENOXAPARIN SODIUM 40 MG: 40 INJECTION SUBCUTANEOUS at 04:08

## 2025-08-09 RX ADMIN — ZINC SULFATE 220 MG (50 MG) CAPSULE 220 MG: CAPSULE at 09:08

## 2025-08-09 RX ADMIN — MEROPENEM 1 G: 1 INJECTION, POWDER, FOR SOLUTION INTRAVENOUS at 12:08

## 2025-08-09 RX ADMIN — SITAGLIPTIN 50 MG: 50 TABLET, FILM COATED ORAL at 09:08

## 2025-08-09 RX ADMIN — DILTIAZEM HYDROCHLORIDE 120 MG: 120 CAPSULE, COATED, EXTENDED RELEASE ORAL at 09:08

## 2025-08-09 RX ADMIN — Medication 1 CAPSULE: at 12:08

## 2025-08-09 RX ADMIN — INSULIN GLARGINE 30 UNITS: 100 INJECTION, SOLUTION SUBCUTANEOUS at 08:08

## 2025-08-09 RX ADMIN — OXYBUTYNIN CHLORIDE 10 MG: 5 TABLET, EXTENDED RELEASE ORAL at 09:08

## 2025-08-09 RX ADMIN — Medication 1 CAPSULE: at 09:08

## 2025-08-09 RX ADMIN — MEROPENEM 1 G: 1 INJECTION, POWDER, FOR SOLUTION INTRAVENOUS at 08:08

## 2025-08-09 RX ADMIN — DAPTOMYCIN 600 MG: 500 INJECTION, POWDER, LYOPHILIZED, FOR SOLUTION INTRAVENOUS at 12:08

## 2025-08-09 RX ADMIN — MEROPENEM 1 G: 1 INJECTION, POWDER, FOR SOLUTION INTRAVENOUS at 05:08

## 2025-08-09 RX ADMIN — PANTOPRAZOLE SODIUM 40 MG: 40 TABLET, DELAYED RELEASE ORAL at 09:08

## 2025-08-10 LAB
GLUCOSE SERPL-MCNC: 162 MG/DL (ref 70–110)
GLUCOSE SERPL-MCNC: 186 MG/DL (ref 70–110)

## 2025-08-10 PROCEDURE — 25000003 PHARM REV CODE 250: Performed by: INTERNAL MEDICINE

## 2025-08-10 PROCEDURE — 27000207 HC ISOLATION

## 2025-08-10 PROCEDURE — 25000003 PHARM REV CODE 250: Performed by: FAMILY MEDICINE

## 2025-08-10 PROCEDURE — 25000003 PHARM REV CODE 250: Performed by: PHYSICIAN ASSISTANT

## 2025-08-10 PROCEDURE — 11000004 HC SNF PRIVATE

## 2025-08-10 PROCEDURE — 63600175 PHARM REV CODE 636 W HCPCS: Performed by: INTERNAL MEDICINE

## 2025-08-10 RX ORDER — INSULIN GLARGINE 100 [IU]/ML
35 INJECTION, SOLUTION SUBCUTANEOUS NIGHTLY
Status: DISCONTINUED | OUTPATIENT
Start: 2025-08-10 | End: 2025-08-10

## 2025-08-10 RX ORDER — INSULIN GLARGINE 100 [IU]/ML
30 INJECTION, SOLUTION SUBCUTANEOUS NIGHTLY
Status: DISCONTINUED | OUTPATIENT
Start: 2025-08-10 | End: 2025-08-21 | Stop reason: HOSPADM

## 2025-08-10 RX ORDER — DOXYLAMINE SUCCINATE 25 MG
TABLET ORAL 2 TIMES DAILY
Status: DISCONTINUED | OUTPATIENT
Start: 2025-08-10 | End: 2025-08-21 | Stop reason: HOSPADM

## 2025-08-10 RX ADMIN — DICLOFENAC SODIUM 2 G: 10 GEL TOPICAL at 08:08

## 2025-08-10 RX ADMIN — INSULIN ASPART 2 UNITS: 100 INJECTION, SOLUTION INTRAVENOUS; SUBCUTANEOUS at 06:08

## 2025-08-10 RX ADMIN — Medication 1 CAPSULE: at 12:08

## 2025-08-10 RX ADMIN — PANTOPRAZOLE SODIUM 40 MG: 40 TABLET, DELAYED RELEASE ORAL at 09:08

## 2025-08-10 RX ADMIN — OXYCODONE HYDROCHLORIDE AND ACETAMINOPHEN 500 MG: 500 TABLET ORAL at 09:08

## 2025-08-10 RX ADMIN — MEROPENEM 1 G: 1 INJECTION, POWDER, FOR SOLUTION INTRAVENOUS at 08:08

## 2025-08-10 RX ADMIN — ENOXAPARIN SODIUM 40 MG: 40 INJECTION SUBCUTANEOUS at 05:08

## 2025-08-10 RX ADMIN — DAPTOMYCIN 600 MG: 500 INJECTION, POWDER, LYOPHILIZED, FOR SOLUTION INTRAVENOUS at 12:08

## 2025-08-10 RX ADMIN — ONDANSETRON 4 MG: 4 TABLET, ORALLY DISINTEGRATING ORAL at 01:08

## 2025-08-10 RX ADMIN — ZINC SULFATE 220 MG (50 MG) CAPSULE 220 MG: CAPSULE at 09:08

## 2025-08-10 RX ADMIN — MEROPENEM 1 G: 1 INJECTION, POWDER, FOR SOLUTION INTRAVENOUS at 05:08

## 2025-08-10 RX ADMIN — MEROPENEM 1 G: 1 INJECTION, POWDER, FOR SOLUTION INTRAVENOUS at 12:08

## 2025-08-10 RX ADMIN — MICONAZOLE NITRATE: 20 CREAM TOPICAL at 12:08

## 2025-08-10 RX ADMIN — MICONAZOLE NITRATE: 20 CREAM TOPICAL at 08:08

## 2025-08-10 RX ADMIN — OXYBUTYNIN CHLORIDE 10 MG: 5 TABLET, EXTENDED RELEASE ORAL at 09:08

## 2025-08-10 RX ADMIN — Medication 1 CAPSULE: at 09:08

## 2025-08-10 RX ADMIN — DILTIAZEM HYDROCHLORIDE 120 MG: 120 CAPSULE, COATED, EXTENDED RELEASE ORAL at 09:08

## 2025-08-10 RX ADMIN — DICLOFENAC SODIUM 2 G: 10 GEL TOPICAL at 09:08

## 2025-08-10 RX ADMIN — SITAGLIPTIN 50 MG: 50 TABLET, FILM COATED ORAL at 09:08

## 2025-08-10 RX ADMIN — INSULIN GLARGINE 30 UNITS: 100 INJECTION, SOLUTION SUBCUTANEOUS at 08:08

## 2025-08-10 RX ADMIN — Medication 1 CAPSULE: at 05:08

## 2025-08-10 RX ADMIN — INSULIN ASPART 2 UNITS: 100 INJECTION, SOLUTION INTRAVENOUS; SUBCUTANEOUS at 08:08

## 2025-08-11 LAB
ALBUMIN SERPL-MCNC: 2.3 G/DL (ref 3.5–5)
ALBUMIN/GLOB SERPL: 0.6 RATIO (ref 1.1–2)
ALP SERPL-CCNC: 103 UNIT/L (ref 40–150)
ALT SERPL-CCNC: 33 UNIT/L (ref 0–55)
ANION GAP SERPL CALC-SCNC: 6 MEQ/L
AST SERPL-CCNC: 23 UNIT/L (ref 11–45)
BASOPHILS # BLD AUTO: 0.03 X10(3)/MCL
BASOPHILS NFR BLD AUTO: 0.5 %
BILIRUB SERPL-MCNC: 0.3 MG/DL
BUN SERPL-MCNC: 44.2 MG/DL (ref 8.4–25.7)
CALCIUM SERPL-MCNC: 8.4 MG/DL (ref 8.4–10.2)
CHLORIDE SERPL-SCNC: 109 MMOL/L (ref 98–107)
CK SERPL-CCNC: 28 U/L (ref 30–200)
CO2 SERPL-SCNC: 24 MMOL/L (ref 22–29)
CREAT SERPL-MCNC: 0.82 MG/DL (ref 0.72–1.25)
CREAT/UREA NIT SERPL: 54
EOSINOPHIL # BLD AUTO: 0.4 X10(3)/MCL (ref 0–0.9)
EOSINOPHIL NFR BLD AUTO: 6 %
ERYTHROCYTE [DISTWIDTH] IN BLOOD BY AUTOMATED COUNT: 19.7 % (ref 11.5–17)
GFR SERPLBLD CREATININE-BSD FMLA CKD-EPI: >60 ML/MIN/1.73/M2
GLOBULIN SER-MCNC: 3.8 GM/DL (ref 2.4–3.5)
GLUCOSE SERPL-MCNC: 105 MG/DL (ref 74–100)
GLUCOSE SERPL-MCNC: 258 MG/DL (ref 70–110)
HCT VFR BLD AUTO: 29.3 % (ref 42–52)
HGB BLD-MCNC: 9 G/DL (ref 14–18)
IMM GRANULOCYTES # BLD AUTO: 0.01 X10(3)/MCL (ref 0–0.04)
IMM GRANULOCYTES NFR BLD AUTO: 0.2 %
LYMPHOCYTES # BLD AUTO: 2.35 X10(3)/MCL (ref 0.6–4.6)
LYMPHOCYTES NFR BLD AUTO: 35.3 %
MCH RBC QN AUTO: 27.1 PG (ref 27–31)
MCHC RBC AUTO-ENTMCNC: 30.7 G/DL (ref 33–36)
MCV RBC AUTO: 88.3 FL (ref 80–94)
MONOCYTES # BLD AUTO: 0.43 X10(3)/MCL (ref 0.1–1.3)
MONOCYTES NFR BLD AUTO: 6.5 %
NEUTROPHILS # BLD AUTO: 3.44 X10(3)/MCL (ref 2.1–9.2)
NEUTROPHILS NFR BLD AUTO: 51.5 %
PLATELET # BLD AUTO: 191 X10(3)/MCL (ref 130–400)
PMV BLD AUTO: 9.7 FL (ref 7.4–10.4)
POTASSIUM SERPL-SCNC: 4.9 MMOL/L (ref 3.5–5.1)
PROT SERPL-MCNC: 6.1 GM/DL (ref 6.4–8.3)
RBC # BLD AUTO: 3.32 X10(6)/MCL (ref 4.7–6.1)
SODIUM SERPL-SCNC: 139 MMOL/L (ref 136–145)
WBC # BLD AUTO: 6.66 X10(3)/MCL (ref 4.5–11.5)

## 2025-08-11 PROCEDURE — 82550 ASSAY OF CK (CPK): CPT | Performed by: INTERNAL MEDICINE

## 2025-08-11 PROCEDURE — 36415 COLL VENOUS BLD VENIPUNCTURE: CPT | Performed by: PHYSICIAN ASSISTANT

## 2025-08-11 PROCEDURE — 25000003 PHARM REV CODE 250: Performed by: PHYSICIAN ASSISTANT

## 2025-08-11 PROCEDURE — 27000207 HC ISOLATION

## 2025-08-11 PROCEDURE — 25000003 PHARM REV CODE 250: Performed by: INTERNAL MEDICINE

## 2025-08-11 PROCEDURE — 11000004 HC SNF PRIVATE

## 2025-08-11 PROCEDURE — 25000003 PHARM REV CODE 250: Performed by: FAMILY MEDICINE

## 2025-08-11 PROCEDURE — 63600175 PHARM REV CODE 636 W HCPCS: Performed by: INTERNAL MEDICINE

## 2025-08-11 PROCEDURE — 63600175 PHARM REV CODE 636 W HCPCS: Performed by: PHYSICIAN ASSISTANT

## 2025-08-11 PROCEDURE — 85025 COMPLETE CBC W/AUTO DIFF WBC: CPT | Performed by: PHYSICIAN ASSISTANT

## 2025-08-11 PROCEDURE — 80053 COMPREHEN METABOLIC PANEL: CPT | Performed by: PHYSICIAN ASSISTANT

## 2025-08-11 RX ADMIN — OXYBUTYNIN CHLORIDE 10 MG: 5 TABLET, EXTENDED RELEASE ORAL at 08:08

## 2025-08-11 RX ADMIN — MEROPENEM 1 G: 1 INJECTION, POWDER, FOR SOLUTION INTRAVENOUS at 08:08

## 2025-08-11 RX ADMIN — MICONAZOLE NITRATE: 20 CREAM TOPICAL at 08:08

## 2025-08-11 RX ADMIN — INSULIN ASPART 3 UNITS: 100 INJECTION, SOLUTION INTRAVENOUS; SUBCUTANEOUS at 08:08

## 2025-08-11 RX ADMIN — MEROPENEM 1 G: 1 INJECTION, POWDER, FOR SOLUTION INTRAVENOUS at 05:08

## 2025-08-11 RX ADMIN — DICLOFENAC SODIUM 2 G: 10 GEL TOPICAL at 08:08

## 2025-08-11 RX ADMIN — Medication 1 CAPSULE: at 04:08

## 2025-08-11 RX ADMIN — ENOXAPARIN SODIUM 40 MG: 40 INJECTION SUBCUTANEOUS at 04:08

## 2025-08-11 RX ADMIN — SITAGLIPTIN 50 MG: 50 TABLET, FILM COATED ORAL at 08:08

## 2025-08-11 RX ADMIN — INSULIN ASPART 2 UNITS: 100 INJECTION, SOLUTION INTRAVENOUS; SUBCUTANEOUS at 04:08

## 2025-08-11 RX ADMIN — MEROPENEM 1 G: 1 INJECTION, POWDER, FOR SOLUTION INTRAVENOUS at 01:08

## 2025-08-11 RX ADMIN — ZINC SULFATE 220 MG (50 MG) CAPSULE 220 MG: CAPSULE at 08:08

## 2025-08-11 RX ADMIN — DILTIAZEM HYDROCHLORIDE 120 MG: 120 CAPSULE, COATED, EXTENDED RELEASE ORAL at 08:08

## 2025-08-11 RX ADMIN — DAPTOMYCIN 600 MG: 500 INJECTION, POWDER, LYOPHILIZED, FOR SOLUTION INTRAVENOUS at 01:08

## 2025-08-11 RX ADMIN — INSULIN GLARGINE 30 UNITS: 100 INJECTION, SOLUTION SUBCUTANEOUS at 08:08

## 2025-08-11 RX ADMIN — PANTOPRAZOLE SODIUM 40 MG: 40 TABLET, DELAYED RELEASE ORAL at 08:08

## 2025-08-11 RX ADMIN — Medication 1 CAPSULE: at 11:08

## 2025-08-11 RX ADMIN — Medication 1 CAPSULE: at 08:08

## 2025-08-11 RX ADMIN — OXYCODONE HYDROCHLORIDE AND ACETAMINOPHEN 500 MG: 500 TABLET ORAL at 08:08

## 2025-08-12 LAB
GLUCOSE SERPL-MCNC: 110 MG/DL (ref 70–110)
GLUCOSE SERPL-MCNC: 152 MG/DL (ref 70–110)
GLUCOSE SERPL-MCNC: 153 MG/DL (ref 70–110)
GLUCOSE SERPL-MCNC: 157 MG/DL (ref 70–110)

## 2025-08-12 PROCEDURE — 25000003 PHARM REV CODE 250: Performed by: INTERNAL MEDICINE

## 2025-08-12 PROCEDURE — 11000004 HC SNF PRIVATE

## 2025-08-12 PROCEDURE — 63600175 PHARM REV CODE 636 W HCPCS: Performed by: INTERNAL MEDICINE

## 2025-08-12 PROCEDURE — 27000207 HC ISOLATION

## 2025-08-12 PROCEDURE — 25000003 PHARM REV CODE 250: Performed by: PHYSICIAN ASSISTANT

## 2025-08-12 PROCEDURE — 63600175 PHARM REV CODE 636 W HCPCS: Performed by: PHYSICIAN ASSISTANT

## 2025-08-12 PROCEDURE — 25000003 PHARM REV CODE 250: Performed by: FAMILY MEDICINE

## 2025-08-12 RX ADMIN — Medication 1 CAPSULE: at 12:08

## 2025-08-12 RX ADMIN — MEROPENEM 1 G: 1 INJECTION, POWDER, FOR SOLUTION INTRAVENOUS at 12:08

## 2025-08-12 RX ADMIN — DAPTOMYCIN 600 MG: 500 INJECTION, POWDER, LYOPHILIZED, FOR SOLUTION INTRAVENOUS at 12:08

## 2025-08-12 RX ADMIN — MICONAZOLE NITRATE: 20 CREAM TOPICAL at 08:08

## 2025-08-12 RX ADMIN — PANTOPRAZOLE SODIUM 40 MG: 40 TABLET, DELAYED RELEASE ORAL at 08:08

## 2025-08-12 RX ADMIN — Medication 1 CAPSULE: at 08:08

## 2025-08-12 RX ADMIN — INSULIN GLARGINE 30 UNITS: 100 INJECTION, SOLUTION SUBCUTANEOUS at 08:08

## 2025-08-12 RX ADMIN — ENOXAPARIN SODIUM 40 MG: 40 INJECTION SUBCUTANEOUS at 05:08

## 2025-08-12 RX ADMIN — OXYCODONE HYDROCHLORIDE AND ACETAMINOPHEN 500 MG: 500 TABLET ORAL at 08:08

## 2025-08-12 RX ADMIN — DILTIAZEM HYDROCHLORIDE 120 MG: 120 CAPSULE, COATED, EXTENDED RELEASE ORAL at 08:08

## 2025-08-12 RX ADMIN — Medication 1 CAPSULE: at 05:08

## 2025-08-12 RX ADMIN — DICLOFENAC SODIUM 2 G: 10 GEL TOPICAL at 08:08

## 2025-08-12 RX ADMIN — MEROPENEM 1 G: 1 INJECTION, POWDER, FOR SOLUTION INTRAVENOUS at 08:08

## 2025-08-12 RX ADMIN — ZINC SULFATE 220 MG (50 MG) CAPSULE 220 MG: CAPSULE at 08:08

## 2025-08-12 RX ADMIN — OXYBUTYNIN CHLORIDE 10 MG: 5 TABLET, EXTENDED RELEASE ORAL at 08:08

## 2025-08-12 RX ADMIN — SITAGLIPTIN 50 MG: 50 TABLET, FILM COATED ORAL at 08:08

## 2025-08-12 RX ADMIN — MEROPENEM 1 G: 1 INJECTION, POWDER, FOR SOLUTION INTRAVENOUS at 05:08

## 2025-08-13 LAB
GLUCOSE SERPL-MCNC: 140 MG/DL (ref 70–110)
GLUCOSE SERPL-MCNC: 145 MG/DL (ref 70–110)
GLUCOSE SERPL-MCNC: 157 MG/DL (ref 70–110)
GLUCOSE SERPL-MCNC: 212 MG/DL (ref 70–110)

## 2025-08-13 PROCEDURE — 25000003 PHARM REV CODE 250: Performed by: PHYSICIAN ASSISTANT

## 2025-08-13 PROCEDURE — 25000003 PHARM REV CODE 250: Performed by: INTERNAL MEDICINE

## 2025-08-13 PROCEDURE — 63600175 PHARM REV CODE 636 W HCPCS: Performed by: INTERNAL MEDICINE

## 2025-08-13 PROCEDURE — 63600175 PHARM REV CODE 636 W HCPCS: Performed by: PHYSICIAN ASSISTANT

## 2025-08-13 PROCEDURE — 97161 PT EVAL LOW COMPLEX 20 MIN: CPT

## 2025-08-13 PROCEDURE — 25000003 PHARM REV CODE 250: Performed by: FAMILY MEDICINE

## 2025-08-13 PROCEDURE — 11000004 HC SNF PRIVATE

## 2025-08-13 PROCEDURE — 27000207 HC ISOLATION

## 2025-08-13 RX ORDER — GLUCAGON 1 MG
1 KIT INJECTION
Status: DISCONTINUED | OUTPATIENT
Start: 2025-08-13 | End: 2025-08-21 | Stop reason: HOSPADM

## 2025-08-13 RX ORDER — IBUPROFEN 200 MG
24 TABLET ORAL
Status: DISCONTINUED | OUTPATIENT
Start: 2025-08-13 | End: 2025-08-21 | Stop reason: HOSPADM

## 2025-08-13 RX ORDER — INSULIN ASPART 100 [IU]/ML
0-5 INJECTION, SOLUTION INTRAVENOUS; SUBCUTANEOUS
Status: DISCONTINUED | OUTPATIENT
Start: 2025-08-13 | End: 2025-08-21 | Stop reason: HOSPADM

## 2025-08-13 RX ORDER — DIPHENHYDRAMINE HCL 25 MG
25 CAPSULE ORAL EVERY 6 HOURS PRN
Status: DISCONTINUED | OUTPATIENT
Start: 2025-08-13 | End: 2025-08-21 | Stop reason: HOSPADM

## 2025-08-13 RX ORDER — IBUPROFEN 200 MG
16 TABLET ORAL
Status: DISCONTINUED | OUTPATIENT
Start: 2025-08-13 | End: 2025-08-21 | Stop reason: HOSPADM

## 2025-08-13 RX ADMIN — OXYCODONE HYDROCHLORIDE AND ACETAMINOPHEN 500 MG: 500 TABLET ORAL at 08:08

## 2025-08-13 RX ADMIN — OXYBUTYNIN CHLORIDE 10 MG: 5 TABLET, EXTENDED RELEASE ORAL at 08:08

## 2025-08-13 RX ADMIN — ZINC SULFATE 220 MG (50 MG) CAPSULE 220 MG: CAPSULE at 08:08

## 2025-08-13 RX ADMIN — MEROPENEM 1 G: 1 INJECTION, POWDER, FOR SOLUTION INTRAVENOUS at 05:08

## 2025-08-13 RX ADMIN — Medication 1 CAPSULE: at 12:08

## 2025-08-13 RX ADMIN — INSULIN ASPART 1 UNITS: 100 INJECTION, SOLUTION INTRAVENOUS; SUBCUTANEOUS at 09:08

## 2025-08-13 RX ADMIN — DAPTOMYCIN 600 MG: 500 INJECTION, POWDER, LYOPHILIZED, FOR SOLUTION INTRAVENOUS at 12:08

## 2025-08-13 RX ADMIN — INSULIN GLARGINE 30 UNITS: 100 INJECTION, SOLUTION SUBCUTANEOUS at 09:08

## 2025-08-13 RX ADMIN — Medication 1 CAPSULE: at 05:08

## 2025-08-13 RX ADMIN — Medication 1 CAPSULE: at 08:08

## 2025-08-13 RX ADMIN — MEROPENEM 1 G: 1 INJECTION, POWDER, FOR SOLUTION INTRAVENOUS at 12:08

## 2025-08-13 RX ADMIN — DICLOFENAC SODIUM 2 G: 10 GEL TOPICAL at 09:08

## 2025-08-13 RX ADMIN — DILTIAZEM HYDROCHLORIDE 120 MG: 120 CAPSULE, COATED, EXTENDED RELEASE ORAL at 08:08

## 2025-08-13 RX ADMIN — MEROPENEM 1 G: 1 INJECTION, POWDER, FOR SOLUTION INTRAVENOUS at 09:08

## 2025-08-13 RX ADMIN — SITAGLIPTIN 50 MG: 50 TABLET, FILM COATED ORAL at 08:08

## 2025-08-13 RX ADMIN — DICLOFENAC SODIUM 2 G: 10 GEL TOPICAL at 08:08

## 2025-08-13 RX ADMIN — PANTOPRAZOLE SODIUM 40 MG: 40 TABLET, DELAYED RELEASE ORAL at 08:08

## 2025-08-13 RX ADMIN — MICONAZOLE NITRATE: 20 CREAM TOPICAL at 08:08

## 2025-08-13 RX ADMIN — MICONAZOLE NITRATE: 20 CREAM TOPICAL at 09:08

## 2025-08-13 RX ADMIN — ENOXAPARIN SODIUM 40 MG: 40 INJECTION SUBCUTANEOUS at 05:08

## 2025-08-14 LAB
GLUCOSE SERPL-MCNC: 124 MG/DL (ref 70–110)
GLUCOSE SERPL-MCNC: 175 MG/DL (ref 70–110)
GLUCOSE SERPL-MCNC: 177 MG/DL (ref 70–110)

## 2025-08-14 PROCEDURE — 63600175 PHARM REV CODE 636 W HCPCS: Performed by: INTERNAL MEDICINE

## 2025-08-14 PROCEDURE — 25000003 PHARM REV CODE 250: Performed by: INTERNAL MEDICINE

## 2025-08-14 PROCEDURE — 11000004 HC SNF PRIVATE

## 2025-08-14 PROCEDURE — 25000003 PHARM REV CODE 250: Performed by: PHYSICIAN ASSISTANT

## 2025-08-14 PROCEDURE — 25000003 PHARM REV CODE 250: Performed by: FAMILY MEDICINE

## 2025-08-14 PROCEDURE — 63600175 PHARM REV CODE 636 W HCPCS: Performed by: PHYSICIAN ASSISTANT

## 2025-08-14 PROCEDURE — 27000207 HC ISOLATION

## 2025-08-14 PROCEDURE — 97530 THERAPEUTIC ACTIVITIES: CPT

## 2025-08-14 RX ADMIN — DILTIAZEM HYDROCHLORIDE 120 MG: 120 CAPSULE, COATED, EXTENDED RELEASE ORAL at 08:08

## 2025-08-14 RX ADMIN — PANTOPRAZOLE SODIUM 40 MG: 40 TABLET, DELAYED RELEASE ORAL at 08:08

## 2025-08-14 RX ADMIN — MEROPENEM 1 G: 1 INJECTION, POWDER, FOR SOLUTION INTRAVENOUS at 05:08

## 2025-08-14 RX ADMIN — ZINC SULFATE 220 MG (50 MG) CAPSULE 220 MG: CAPSULE at 08:08

## 2025-08-14 RX ADMIN — Medication 1 CAPSULE: at 12:08

## 2025-08-14 RX ADMIN — INSULIN GLARGINE 30 UNITS: 100 INJECTION, SOLUTION SUBCUTANEOUS at 09:08

## 2025-08-14 RX ADMIN — OXYBUTYNIN CHLORIDE 10 MG: 5 TABLET, EXTENDED RELEASE ORAL at 08:08

## 2025-08-14 RX ADMIN — MEROPENEM 1 G: 1 INJECTION, POWDER, FOR SOLUTION INTRAVENOUS at 09:08

## 2025-08-14 RX ADMIN — DICLOFENAC SODIUM 2 G: 10 GEL TOPICAL at 09:08

## 2025-08-14 RX ADMIN — SITAGLIPTIN 50 MG: 50 TABLET, FILM COATED ORAL at 08:08

## 2025-08-14 RX ADMIN — INSULIN ASPART 1 UNITS: 100 INJECTION, SOLUTION INTRAVENOUS; SUBCUTANEOUS at 09:08

## 2025-08-14 RX ADMIN — ONDANSETRON 4 MG: 4 TABLET, ORALLY DISINTEGRATING ORAL at 01:08

## 2025-08-14 RX ADMIN — Medication 1 CAPSULE: at 08:08

## 2025-08-14 RX ADMIN — Medication 1 CAPSULE: at 05:08

## 2025-08-14 RX ADMIN — DICLOFENAC SODIUM 2 G: 10 GEL TOPICAL at 08:08

## 2025-08-14 RX ADMIN — ENOXAPARIN SODIUM 40 MG: 40 INJECTION SUBCUTANEOUS at 05:08

## 2025-08-14 RX ADMIN — DAPTOMYCIN 600 MG: 500 INJECTION, POWDER, LYOPHILIZED, FOR SOLUTION INTRAVENOUS at 01:08

## 2025-08-14 RX ADMIN — MEROPENEM 1 G: 1 INJECTION, POWDER, FOR SOLUTION INTRAVENOUS at 01:08

## 2025-08-14 RX ADMIN — OXYCODONE HYDROCHLORIDE AND ACETAMINOPHEN 500 MG: 500 TABLET ORAL at 08:08

## 2025-08-15 LAB
GLUCOSE SERPL-MCNC: 139 MG/DL (ref 70–110)
GLUCOSE SERPL-MCNC: 147 MG/DL (ref 70–110)
GLUCOSE SERPL-MCNC: 150 MG/DL (ref 70–110)

## 2025-08-15 PROCEDURE — 25000003 PHARM REV CODE 250: Performed by: FAMILY MEDICINE

## 2025-08-15 PROCEDURE — 25000003 PHARM REV CODE 250: Performed by: INTERNAL MEDICINE

## 2025-08-15 PROCEDURE — 63600175 PHARM REV CODE 636 W HCPCS: Performed by: PHYSICIAN ASSISTANT

## 2025-08-15 PROCEDURE — A4216 STERILE WATER/SALINE, 10 ML: HCPCS | Performed by: INTERNAL MEDICINE

## 2025-08-15 PROCEDURE — 25000003 PHARM REV CODE 250: Performed by: PHYSICIAN ASSISTANT

## 2025-08-15 PROCEDURE — 63600175 PHARM REV CODE 636 W HCPCS: Performed by: INTERNAL MEDICINE

## 2025-08-15 PROCEDURE — 11000004 HC SNF PRIVATE

## 2025-08-15 PROCEDURE — 97530 THERAPEUTIC ACTIVITIES: CPT

## 2025-08-15 PROCEDURE — 27000207 HC ISOLATION

## 2025-08-15 RX ADMIN — INSULIN GLARGINE 30 UNITS: 100 INJECTION, SOLUTION SUBCUTANEOUS at 09:08

## 2025-08-15 RX ADMIN — ZINC SULFATE 220 MG (50 MG) CAPSULE 220 MG: CAPSULE at 09:08

## 2025-08-15 RX ADMIN — MEROPENEM 1 G: 1 INJECTION, POWDER, FOR SOLUTION INTRAVENOUS at 01:08

## 2025-08-15 RX ADMIN — PANTOPRAZOLE SODIUM 40 MG: 40 TABLET, DELAYED RELEASE ORAL at 09:08

## 2025-08-15 RX ADMIN — Medication 10 ML: at 09:08

## 2025-08-15 RX ADMIN — DICLOFENAC SODIUM 2 G: 10 GEL TOPICAL at 09:08

## 2025-08-15 RX ADMIN — Medication 1 CAPSULE: at 09:08

## 2025-08-15 RX ADMIN — OXYBUTYNIN CHLORIDE 10 MG: 5 TABLET, EXTENDED RELEASE ORAL at 09:08

## 2025-08-15 RX ADMIN — DAPTOMYCIN 600 MG: 500 INJECTION, POWDER, LYOPHILIZED, FOR SOLUTION INTRAVENOUS at 12:08

## 2025-08-15 RX ADMIN — MICONAZOLE NITRATE: 20 CREAM TOPICAL at 09:08

## 2025-08-15 RX ADMIN — DILTIAZEM HYDROCHLORIDE 120 MG: 120 CAPSULE, COATED, EXTENDED RELEASE ORAL at 09:08

## 2025-08-15 RX ADMIN — SITAGLIPTIN 50 MG: 50 TABLET, FILM COATED ORAL at 09:08

## 2025-08-15 RX ADMIN — ONDANSETRON 4 MG: 4 TABLET, ORALLY DISINTEGRATING ORAL at 02:08

## 2025-08-15 RX ADMIN — ENOXAPARIN SODIUM 40 MG: 40 INJECTION SUBCUTANEOUS at 05:08

## 2025-08-15 RX ADMIN — MEROPENEM 1 G: 1 INJECTION, POWDER, FOR SOLUTION INTRAVENOUS at 05:08

## 2025-08-15 RX ADMIN — OXYCODONE HYDROCHLORIDE AND ACETAMINOPHEN 500 MG: 500 TABLET ORAL at 09:08

## 2025-08-15 RX ADMIN — Medication 1 CAPSULE: at 05:08

## 2025-08-15 RX ADMIN — Medication 1 CAPSULE: at 12:08

## 2025-08-15 RX ADMIN — MEROPENEM 1 G: 1 INJECTION, POWDER, FOR SOLUTION INTRAVENOUS at 09:08

## 2025-08-16 LAB
GLUCOSE SERPL-MCNC: 106 MG/DL (ref 70–110)
GLUCOSE SERPL-MCNC: 136 MG/DL (ref 70–110)
GLUCOSE SERPL-MCNC: 174 MG/DL (ref 70–110)
GLUCOSE SERPL-MCNC: 193 MG/DL (ref 70–110)

## 2025-08-16 PROCEDURE — 97530 THERAPEUTIC ACTIVITIES: CPT

## 2025-08-16 PROCEDURE — 25000003 PHARM REV CODE 250: Performed by: PHYSICIAN ASSISTANT

## 2025-08-16 PROCEDURE — A4216 STERILE WATER/SALINE, 10 ML: HCPCS | Performed by: INTERNAL MEDICINE

## 2025-08-16 PROCEDURE — 11000004 HC SNF PRIVATE

## 2025-08-16 PROCEDURE — 63600175 PHARM REV CODE 636 W HCPCS: Performed by: INTERNAL MEDICINE

## 2025-08-16 PROCEDURE — 63600175 PHARM REV CODE 636 W HCPCS: Performed by: PHYSICIAN ASSISTANT

## 2025-08-16 PROCEDURE — 27000207 HC ISOLATION

## 2025-08-16 PROCEDURE — 25000003 PHARM REV CODE 250: Performed by: INTERNAL MEDICINE

## 2025-08-16 PROCEDURE — 25000003 PHARM REV CODE 250: Performed by: FAMILY MEDICINE

## 2025-08-16 RX ADMIN — INSULIN GLARGINE 30 UNITS: 100 INJECTION, SOLUTION SUBCUTANEOUS at 09:08

## 2025-08-16 RX ADMIN — ZINC SULFATE 220 MG (50 MG) CAPSULE 220 MG: CAPSULE at 09:08

## 2025-08-16 RX ADMIN — ENOXAPARIN SODIUM 40 MG: 40 INJECTION SUBCUTANEOUS at 04:08

## 2025-08-16 RX ADMIN — Medication 1 CAPSULE: at 04:08

## 2025-08-16 RX ADMIN — DAPTOMYCIN 600 MG: 500 INJECTION, POWDER, LYOPHILIZED, FOR SOLUTION INTRAVENOUS at 01:08

## 2025-08-16 RX ADMIN — DICLOFENAC SODIUM 2 G: 10 GEL TOPICAL at 09:08

## 2025-08-16 RX ADMIN — MEROPENEM 1 G: 1 INJECTION, POWDER, FOR SOLUTION INTRAVENOUS at 12:08

## 2025-08-16 RX ADMIN — MICONAZOLE NITRATE: 20 CREAM TOPICAL at 09:08

## 2025-08-16 RX ADMIN — MEROPENEM 1 G: 1 INJECTION, POWDER, FOR SOLUTION INTRAVENOUS at 08:08

## 2025-08-16 RX ADMIN — Medication 10 ML: at 08:08

## 2025-08-16 RX ADMIN — Medication 1 CAPSULE: at 08:08

## 2025-08-16 RX ADMIN — SITAGLIPTIN 50 MG: 50 TABLET, FILM COATED ORAL at 09:08

## 2025-08-16 RX ADMIN — DILTIAZEM HYDROCHLORIDE 120 MG: 120 CAPSULE, COATED, EXTENDED RELEASE ORAL at 09:08

## 2025-08-16 RX ADMIN — OXYBUTYNIN CHLORIDE 10 MG: 5 TABLET, EXTENDED RELEASE ORAL at 09:08

## 2025-08-16 RX ADMIN — MEROPENEM 1 G: 1 INJECTION, POWDER, FOR SOLUTION INTRAVENOUS at 05:08

## 2025-08-16 RX ADMIN — Medication 10 ML: at 05:08

## 2025-08-16 RX ADMIN — Medication 1 CAPSULE: at 12:08

## 2025-08-16 RX ADMIN — PANTOPRAZOLE SODIUM 40 MG: 40 TABLET, DELAYED RELEASE ORAL at 09:08

## 2025-08-16 RX ADMIN — OXYCODONE HYDROCHLORIDE AND ACETAMINOPHEN 500 MG: 500 TABLET ORAL at 09:08

## 2025-08-17 LAB
GLUCOSE SERPL-MCNC: 151 MG/DL (ref 70–110)
GLUCOSE SERPL-MCNC: 151 MG/DL (ref 70–110)
GLUCOSE SERPL-MCNC: 188 MG/DL (ref 70–110)

## 2025-08-17 PROCEDURE — 25000003 PHARM REV CODE 250: Performed by: INTERNAL MEDICINE

## 2025-08-17 PROCEDURE — 25000003 PHARM REV CODE 250: Performed by: PHYSICIAN ASSISTANT

## 2025-08-17 PROCEDURE — 11000004 HC SNF PRIVATE

## 2025-08-17 PROCEDURE — 63600175 PHARM REV CODE 636 W HCPCS: Mod: JZ,TB | Performed by: PHYSICIAN ASSISTANT

## 2025-08-17 PROCEDURE — 63600175 PHARM REV CODE 636 W HCPCS: Performed by: PHYSICIAN ASSISTANT

## 2025-08-17 PROCEDURE — 27000207 HC ISOLATION

## 2025-08-17 PROCEDURE — A4216 STERILE WATER/SALINE, 10 ML: HCPCS | Performed by: INTERNAL MEDICINE

## 2025-08-17 PROCEDURE — 25000003 PHARM REV CODE 250: Performed by: FAMILY MEDICINE

## 2025-08-17 PROCEDURE — 63600175 PHARM REV CODE 636 W HCPCS: Performed by: INTERNAL MEDICINE

## 2025-08-17 RX ADMIN — MEROPENEM 1 G: 1 INJECTION, POWDER, FOR SOLUTION INTRAVENOUS at 05:08

## 2025-08-17 RX ADMIN — ZINC SULFATE 220 MG (50 MG) CAPSULE 220 MG: CAPSULE at 09:08

## 2025-08-17 RX ADMIN — Medication 1 CAPSULE: at 09:08

## 2025-08-17 RX ADMIN — SITAGLIPTIN 50 MG: 50 TABLET, FILM COATED ORAL at 09:08

## 2025-08-17 RX ADMIN — OXYCODONE HYDROCHLORIDE AND ACETAMINOPHEN 500 MG: 500 TABLET ORAL at 09:08

## 2025-08-17 RX ADMIN — Medication 1 CAPSULE: at 12:08

## 2025-08-17 RX ADMIN — INSULIN GLARGINE 30 UNITS: 100 INJECTION, SOLUTION SUBCUTANEOUS at 09:08

## 2025-08-17 RX ADMIN — Medication 1 CAPSULE: at 04:08

## 2025-08-17 RX ADMIN — Medication 10 ML: at 05:08

## 2025-08-17 RX ADMIN — ENOXAPARIN SODIUM 40 MG: 40 INJECTION SUBCUTANEOUS at 04:08

## 2025-08-17 RX ADMIN — DAPTOMYCIN 600 MG: 500 INJECTION, POWDER, LYOPHILIZED, FOR SOLUTION INTRAVENOUS at 12:08

## 2025-08-17 RX ADMIN — DICLOFENAC SODIUM 2 G: 10 GEL TOPICAL at 09:08

## 2025-08-17 RX ADMIN — PANTOPRAZOLE SODIUM 40 MG: 40 TABLET, DELAYED RELEASE ORAL at 09:08

## 2025-08-17 RX ADMIN — OXYBUTYNIN CHLORIDE 10 MG: 5 TABLET, EXTENDED RELEASE ORAL at 09:08

## 2025-08-17 RX ADMIN — MICONAZOLE NITRATE: 20 CREAM TOPICAL at 09:08

## 2025-08-17 RX ADMIN — MEROPENEM 1 G: 1 INJECTION, POWDER, FOR SOLUTION INTRAVENOUS at 12:08

## 2025-08-17 RX ADMIN — MEROPENEM 1 G: 1 INJECTION, POWDER, FOR SOLUTION INTRAVENOUS at 09:08

## 2025-08-17 RX ADMIN — DILTIAZEM HYDROCHLORIDE 120 MG: 120 CAPSULE, COATED, EXTENDED RELEASE ORAL at 09:08

## 2025-08-17 RX ADMIN — ONDANSETRON 4 MG: 4 TABLET, ORALLY DISINTEGRATING ORAL at 11:08

## 2025-08-18 PROBLEM — L89.90 DECUBITUS ULCER, INFECTED: Status: RESOLVED | Noted: 2024-02-26 | Resolved: 2025-08-18

## 2025-08-18 PROBLEM — L08.9 DECUBITUS ULCER, INFECTED: Status: RESOLVED | Noted: 2024-02-26 | Resolved: 2025-08-18

## 2025-08-18 LAB
ALBUMIN SERPL-MCNC: 2.4 G/DL (ref 3.5–5)
ALBUMIN/GLOB SERPL: 0.6 RATIO (ref 1.1–2)
ALP SERPL-CCNC: 92 UNIT/L (ref 40–150)
ALT SERPL-CCNC: 30 UNIT/L (ref 0–55)
ANION GAP SERPL CALC-SCNC: 7 MEQ/L
AST SERPL-CCNC: 11 UNIT/L (ref 11–45)
BASOPHILS # BLD AUTO: 0.02 X10(3)/MCL
BASOPHILS NFR BLD AUTO: 0.3 %
BILIRUB SERPL-MCNC: 0.3 MG/DL
BUN SERPL-MCNC: 62.6 MG/DL (ref 8.4–25.7)
CALCIUM SERPL-MCNC: 8.8 MG/DL (ref 8.4–10.2)
CHLORIDE SERPL-SCNC: 109 MMOL/L (ref 98–107)
CK SERPL-CCNC: 31 U/L (ref 30–200)
CO2 SERPL-SCNC: 23 MMOL/L (ref 22–29)
CREAT SERPL-MCNC: 1.02 MG/DL (ref 0.72–1.25)
CREAT/UREA NIT SERPL: 61
EOSINOPHIL # BLD AUTO: 0.41 X10(3)/MCL (ref 0–0.9)
EOSINOPHIL NFR BLD AUTO: 6.6 %
ERYTHROCYTE [DISTWIDTH] IN BLOOD BY AUTOMATED COUNT: 19.1 % (ref 11.5–17)
GFR SERPLBLD CREATININE-BSD FMLA CKD-EPI: >60 ML/MIN/1.73/M2
GLOBULIN SER-MCNC: 4.1 GM/DL (ref 2.4–3.5)
GLUCOSE SERPL-MCNC: 135 MG/DL (ref 70–110)
GLUCOSE SERPL-MCNC: 135 MG/DL (ref 74–100)
GLUCOSE SERPL-MCNC: 190 MG/DL (ref 70–110)
GLUCOSE SERPL-MCNC: 234 MG/DL (ref 70–110)
HCT VFR BLD AUTO: 30.5 % (ref 42–52)
HGB BLD-MCNC: 9.7 G/DL (ref 14–18)
IMM GRANULOCYTES # BLD AUTO: 0.02 X10(3)/MCL (ref 0–0.04)
IMM GRANULOCYTES NFR BLD AUTO: 0.3 %
LYMPHOCYTES # BLD AUTO: 3 X10(3)/MCL (ref 0.6–4.6)
LYMPHOCYTES NFR BLD AUTO: 48.6 %
MCH RBC QN AUTO: 27.6 PG (ref 27–31)
MCHC RBC AUTO-ENTMCNC: 31.8 G/DL (ref 33–36)
MCV RBC AUTO: 86.9 FL (ref 80–94)
MONOCYTES # BLD AUTO: 0.4 X10(3)/MCL (ref 0.1–1.3)
MONOCYTES NFR BLD AUTO: 6.5 %
NEUTROPHILS # BLD AUTO: 2.32 X10(3)/MCL (ref 2.1–9.2)
NEUTROPHILS NFR BLD AUTO: 37.7 %
PLATELET # BLD AUTO: 202 X10(3)/MCL (ref 130–400)
PMV BLD AUTO: 10.7 FL (ref 7.4–10.4)
POTASSIUM SERPL-SCNC: 4.8 MMOL/L (ref 3.5–5.1)
PREALB SERPL-MCNC: 24.8 MG/DL (ref 18–45)
PROT SERPL-MCNC: 6.5 GM/DL (ref 6.4–8.3)
RBC # BLD AUTO: 3.51 X10(6)/MCL (ref 4.7–6.1)
SODIUM SERPL-SCNC: 139 MMOL/L (ref 136–145)
WBC # BLD AUTO: 6.17 X10(3)/MCL (ref 4.5–11.5)

## 2025-08-18 PROCEDURE — 11000004 HC SNF PRIVATE

## 2025-08-18 PROCEDURE — 82550 ASSAY OF CK (CPK): CPT | Performed by: PHYSICIAN ASSISTANT

## 2025-08-18 PROCEDURE — 27000207 HC ISOLATION

## 2025-08-18 PROCEDURE — 63600175 PHARM REV CODE 636 W HCPCS: Performed by: PHYSICIAN ASSISTANT

## 2025-08-18 PROCEDURE — 25000003 PHARM REV CODE 250: Performed by: INTERNAL MEDICINE

## 2025-08-18 PROCEDURE — 25000003 PHARM REV CODE 250: Performed by: FAMILY MEDICINE

## 2025-08-18 PROCEDURE — 63600175 PHARM REV CODE 636 W HCPCS: Performed by: INTERNAL MEDICINE

## 2025-08-18 PROCEDURE — 80053 COMPREHEN METABOLIC PANEL: CPT | Performed by: PHYSICIAN ASSISTANT

## 2025-08-18 PROCEDURE — 25000003 PHARM REV CODE 250: Performed by: PHYSICIAN ASSISTANT

## 2025-08-18 PROCEDURE — 85025 COMPLETE CBC W/AUTO DIFF WBC: CPT | Performed by: PHYSICIAN ASSISTANT

## 2025-08-18 PROCEDURE — 84134 ASSAY OF PREALBUMIN: CPT | Performed by: PHYSICIAN ASSISTANT

## 2025-08-18 PROCEDURE — 36415 COLL VENOUS BLD VENIPUNCTURE: CPT | Performed by: PHYSICIAN ASSISTANT

## 2025-08-18 PROCEDURE — 97530 THERAPEUTIC ACTIVITIES: CPT

## 2025-08-18 RX ADMIN — MEROPENEM 1 G: 1 INJECTION, POWDER, FOR SOLUTION INTRAVENOUS at 10:08

## 2025-08-18 RX ADMIN — DILTIAZEM HYDROCHLORIDE 120 MG: 120 CAPSULE, COATED, EXTENDED RELEASE ORAL at 08:08

## 2025-08-18 RX ADMIN — ZINC SULFATE 220 MG (50 MG) CAPSULE 220 MG: CAPSULE at 08:08

## 2025-08-18 RX ADMIN — OXYCODONE HYDROCHLORIDE AND ACETAMINOPHEN 500 MG: 500 TABLET ORAL at 08:08

## 2025-08-18 RX ADMIN — DICLOFENAC SODIUM 2 G: 10 GEL TOPICAL at 08:08

## 2025-08-18 RX ADMIN — ENOXAPARIN SODIUM 40 MG: 40 INJECTION SUBCUTANEOUS at 04:08

## 2025-08-18 RX ADMIN — MEROPENEM 1 G: 1 INJECTION, POWDER, FOR SOLUTION INTRAVENOUS at 01:08

## 2025-08-18 RX ADMIN — SITAGLIPTIN 50 MG: 50 TABLET, FILM COATED ORAL at 08:08

## 2025-08-18 RX ADMIN — Medication 1 CAPSULE: at 04:08

## 2025-08-18 RX ADMIN — INSULIN ASPART 1 UNITS: 100 INJECTION, SOLUTION INTRAVENOUS; SUBCUTANEOUS at 10:08

## 2025-08-18 RX ADMIN — PANTOPRAZOLE SODIUM 40 MG: 40 TABLET, DELAYED RELEASE ORAL at 08:08

## 2025-08-18 RX ADMIN — MICONAZOLE NITRATE: 20 CREAM TOPICAL at 10:08

## 2025-08-18 RX ADMIN — OXYBUTYNIN CHLORIDE 10 MG: 5 TABLET, EXTENDED RELEASE ORAL at 08:08

## 2025-08-18 RX ADMIN — MEROPENEM 1 G: 1 INJECTION, POWDER, FOR SOLUTION INTRAVENOUS at 06:08

## 2025-08-18 RX ADMIN — MICONAZOLE NITRATE: 20 CREAM TOPICAL at 08:08

## 2025-08-18 RX ADMIN — INSULIN GLARGINE 30 UNITS: 100 INJECTION, SOLUTION SUBCUTANEOUS at 10:08

## 2025-08-18 RX ADMIN — DAPTOMYCIN 600 MG: 500 INJECTION, POWDER, LYOPHILIZED, FOR SOLUTION INTRAVENOUS at 01:08

## 2025-08-18 RX ADMIN — DICLOFENAC SODIUM 2 G: 10 GEL TOPICAL at 10:08

## 2025-08-18 RX ADMIN — Medication 1 CAPSULE: at 08:08

## 2025-08-18 RX ADMIN — Medication 1 CAPSULE: at 01:08

## 2025-08-19 LAB
GLUCOSE SERPL-MCNC: 139 MG/DL (ref 70–110)
GLUCOSE SERPL-MCNC: 186 MG/DL (ref 70–110)
GLUCOSE SERPL-MCNC: 198 MG/DL (ref 70–110)

## 2025-08-19 PROCEDURE — 25000003 PHARM REV CODE 250: Performed by: FAMILY MEDICINE

## 2025-08-19 PROCEDURE — 25000003 PHARM REV CODE 250: Performed by: PHYSICIAN ASSISTANT

## 2025-08-19 PROCEDURE — 97535 SELF CARE MNGMENT TRAINING: CPT | Mod: CQ

## 2025-08-19 PROCEDURE — 63600175 PHARM REV CODE 636 W HCPCS: Performed by: PHYSICIAN ASSISTANT

## 2025-08-19 PROCEDURE — 97530 THERAPEUTIC ACTIVITIES: CPT | Mod: CQ

## 2025-08-19 PROCEDURE — 25000003 PHARM REV CODE 250: Performed by: INTERNAL MEDICINE

## 2025-08-19 PROCEDURE — 97110 THERAPEUTIC EXERCISES: CPT | Mod: CQ

## 2025-08-19 PROCEDURE — 63600175 PHARM REV CODE 636 W HCPCS: Performed by: INTERNAL MEDICINE

## 2025-08-19 PROCEDURE — 11000004 HC SNF PRIVATE

## 2025-08-19 PROCEDURE — 27000207 HC ISOLATION

## 2025-08-19 RX ADMIN — Medication 1 CAPSULE: at 08:08

## 2025-08-19 RX ADMIN — Medication 1 CAPSULE: at 04:08

## 2025-08-19 RX ADMIN — Medication 1 CAPSULE: at 01:08

## 2025-08-19 RX ADMIN — DILTIAZEM HYDROCHLORIDE 120 MG: 120 CAPSULE, COATED, EXTENDED RELEASE ORAL at 08:08

## 2025-08-19 RX ADMIN — PANTOPRAZOLE SODIUM 40 MG: 40 TABLET, DELAYED RELEASE ORAL at 08:08

## 2025-08-19 RX ADMIN — SITAGLIPTIN 50 MG: 50 TABLET, FILM COATED ORAL at 08:08

## 2025-08-19 RX ADMIN — MEROPENEM 1 G: 1 INJECTION, POWDER, FOR SOLUTION INTRAVENOUS at 01:08

## 2025-08-19 RX ADMIN — MICONAZOLE NITRATE: 20 CREAM TOPICAL at 08:08

## 2025-08-19 RX ADMIN — MEROPENEM 1 G: 1 INJECTION, POWDER, FOR SOLUTION INTRAVENOUS at 08:08

## 2025-08-19 RX ADMIN — OXYBUTYNIN CHLORIDE 10 MG: 5 TABLET, EXTENDED RELEASE ORAL at 08:08

## 2025-08-19 RX ADMIN — OXYCODONE HYDROCHLORIDE AND ACETAMINOPHEN 500 MG: 500 TABLET ORAL at 08:08

## 2025-08-19 RX ADMIN — ZINC SULFATE 220 MG (50 MG) CAPSULE 220 MG: CAPSULE at 08:08

## 2025-08-19 RX ADMIN — MEROPENEM 1 G: 1 INJECTION, POWDER, FOR SOLUTION INTRAVENOUS at 05:08

## 2025-08-19 RX ADMIN — DICLOFENAC SODIUM 2 G: 10 GEL TOPICAL at 08:08

## 2025-08-19 RX ADMIN — DAPTOMYCIN 600 MG: 500 INJECTION, POWDER, LYOPHILIZED, FOR SOLUTION INTRAVENOUS at 01:08

## 2025-08-19 RX ADMIN — ENOXAPARIN SODIUM 40 MG: 40 INJECTION SUBCUTANEOUS at 04:08

## 2025-08-19 RX ADMIN — INSULIN GLARGINE 30 UNITS: 100 INJECTION, SOLUTION SUBCUTANEOUS at 09:08

## 2025-08-20 LAB
GLUCOSE SERPL-MCNC: 129 MG/DL (ref 70–110)
GLUCOSE SERPL-MCNC: 165 MG/DL (ref 70–110)
GLUCOSE SERPL-MCNC: 197 MG/DL (ref 70–110)
POC GLU MONITORING DEVICE: 191

## 2025-08-20 PROCEDURE — 25000003 PHARM REV CODE 250: Performed by: INTERNAL MEDICINE

## 2025-08-20 PROCEDURE — 63600175 PHARM REV CODE 636 W HCPCS: Performed by: PHYSICIAN ASSISTANT

## 2025-08-20 PROCEDURE — 63600175 PHARM REV CODE 636 W HCPCS: Performed by: INTERNAL MEDICINE

## 2025-08-20 PROCEDURE — 27000207 HC ISOLATION

## 2025-08-20 PROCEDURE — 25000003 PHARM REV CODE 250: Performed by: PHYSICIAN ASSISTANT

## 2025-08-20 PROCEDURE — 97530 THERAPEUTIC ACTIVITIES: CPT | Mod: CQ

## 2025-08-20 PROCEDURE — 25000003 PHARM REV CODE 250: Performed by: FAMILY MEDICINE

## 2025-08-20 PROCEDURE — 11000004 HC SNF PRIVATE

## 2025-08-20 PROCEDURE — 97110 THERAPEUTIC EXERCISES: CPT | Mod: CQ

## 2025-08-20 RX ADMIN — MEROPENEM 1 G: 1 INJECTION, POWDER, FOR SOLUTION INTRAVENOUS at 06:08

## 2025-08-20 RX ADMIN — SITAGLIPTIN 50 MG: 50 TABLET, FILM COATED ORAL at 09:08

## 2025-08-20 RX ADMIN — OXYBUTYNIN CHLORIDE 10 MG: 5 TABLET, EXTENDED RELEASE ORAL at 09:08

## 2025-08-20 RX ADMIN — DICLOFENAC SODIUM 2 G: 10 GEL TOPICAL at 08:08

## 2025-08-20 RX ADMIN — MEROPENEM 1 G: 1 INJECTION, POWDER, FOR SOLUTION INTRAVENOUS at 08:08

## 2025-08-20 RX ADMIN — MICONAZOLE NITRATE: 20 CREAM TOPICAL at 09:08

## 2025-08-20 RX ADMIN — DILTIAZEM HYDROCHLORIDE 120 MG: 120 CAPSULE, COATED, EXTENDED RELEASE ORAL at 09:08

## 2025-08-20 RX ADMIN — DICLOFENAC SODIUM 2 G: 10 GEL TOPICAL at 09:08

## 2025-08-20 RX ADMIN — PANTOPRAZOLE SODIUM 40 MG: 40 TABLET, DELAYED RELEASE ORAL at 09:08

## 2025-08-20 RX ADMIN — Medication 1 CAPSULE: at 08:08

## 2025-08-20 RX ADMIN — OXYCODONE HYDROCHLORIDE AND ACETAMINOPHEN 500 MG: 500 TABLET ORAL at 09:08

## 2025-08-20 RX ADMIN — ENOXAPARIN SODIUM 40 MG: 40 INJECTION SUBCUTANEOUS at 05:08

## 2025-08-20 RX ADMIN — INSULIN GLARGINE 30 UNITS: 100 INJECTION, SOLUTION SUBCUTANEOUS at 08:08

## 2025-08-20 RX ADMIN — MEROPENEM 1 G: 1 INJECTION, POWDER, FOR SOLUTION INTRAVENOUS at 01:08

## 2025-08-20 RX ADMIN — DAPTOMYCIN 600 MG: 500 INJECTION, POWDER, LYOPHILIZED, FOR SOLUTION INTRAVENOUS at 12:08

## 2025-08-20 RX ADMIN — ZINC SULFATE 220 MG (50 MG) CAPSULE 220 MG: CAPSULE at 09:08

## 2025-08-20 RX ADMIN — MICONAZOLE NITRATE: 20 CREAM TOPICAL at 08:08

## 2025-08-20 RX ADMIN — Medication 1 CAPSULE: at 11:08

## 2025-08-20 RX ADMIN — Medication 1 CAPSULE: at 05:08

## 2025-08-21 VITALS
HEIGHT: 68 IN | BODY MASS INDEX: 30.3 KG/M2 | SYSTOLIC BLOOD PRESSURE: 151 MMHG | DIASTOLIC BLOOD PRESSURE: 81 MMHG | TEMPERATURE: 98 F | RESPIRATION RATE: 18 BRPM | WEIGHT: 199.94 LBS | OXYGEN SATURATION: 98 % | HEART RATE: 67 BPM

## 2025-08-21 LAB
GLUCOSE SERPL-MCNC: 120 MG/DL (ref 70–110)
GLUCOSE SERPL-MCNC: 182 MG/DL (ref 70–110)
GLUCOSE SERPL-MCNC: 191 MG/DL (ref 70–110)
GLUCOSE SERPL-MCNC: 279 MG/DL (ref 70–110)

## 2025-08-21 PROCEDURE — 25000003 PHARM REV CODE 250: Performed by: INTERNAL MEDICINE

## 2025-08-21 PROCEDURE — 25000003 PHARM REV CODE 250: Performed by: FAMILY MEDICINE

## 2025-08-21 PROCEDURE — 25000003 PHARM REV CODE 250: Performed by: PHYSICIAN ASSISTANT

## 2025-08-21 RX ORDER — PEN NEEDLE, DIABETIC 30 GX3/16"
1 NEEDLE, DISPOSABLE MISCELLANEOUS NIGHTLY
Qty: 30 EACH | Refills: 0 | Status: SHIPPED | OUTPATIENT
Start: 2025-08-21

## 2025-08-21 RX ORDER — DILTIAZEM HYDROCHLORIDE 120 MG/1
120 CAPSULE, COATED, EXTENDED RELEASE ORAL DAILY
Qty: 30 CAPSULE | Refills: 0 | Status: SHIPPED | OUTPATIENT
Start: 2025-08-21 | End: 2025-09-20

## 2025-08-21 RX ORDER — ASCORBIC ACID 500 MG
500 TABLET ORAL DAILY
Qty: 30 TABLET | Refills: 0 | Status: SHIPPED | OUTPATIENT
Start: 2025-08-22 | End: 2025-09-21

## 2025-08-21 RX ORDER — OXYBUTYNIN CHLORIDE 10 MG/1
10 TABLET, EXTENDED RELEASE ORAL DAILY
Qty: 30 TABLET | Refills: 0 | Status: SHIPPED | OUTPATIENT
Start: 2025-08-21 | End: 2025-09-20

## 2025-08-21 RX ORDER — FERROUS SULFATE 325(65) MG
325 TABLET, DELAYED RELEASE (ENTERIC COATED) ORAL DAILY
Qty: 30 TABLET | Refills: 0 | Status: SHIPPED | OUTPATIENT
Start: 2025-08-21

## 2025-08-21 RX ORDER — ZINC SULFATE 50(220)MG
220 CAPSULE ORAL DAILY
Qty: 30 CAPSULE | Refills: 0 | Status: SHIPPED | OUTPATIENT
Start: 2025-08-22 | End: 2025-09-21

## 2025-08-21 RX ORDER — INSULIN GLARGINE 100 [IU]/ML
30 INJECTION, SOLUTION SUBCUTANEOUS NIGHTLY
Qty: 9 ML | Refills: 0 | Status: SHIPPED | OUTPATIENT
Start: 2025-08-21 | End: 2025-08-21 | Stop reason: HOSPADM

## 2025-08-21 RX ORDER — INSULIN GLARGINE 100 [IU]/ML
30 INJECTION, SOLUTION SUBCUTANEOUS NIGHTLY
Qty: 9 ML | Refills: 0 | Status: SHIPPED | OUTPATIENT
Start: 2025-08-21 | End: 2025-09-20

## 2025-08-21 RX ADMIN — PANTOPRAZOLE SODIUM 40 MG: 40 TABLET, DELAYED RELEASE ORAL at 09:08

## 2025-08-21 RX ADMIN — DICLOFENAC SODIUM 2 G: 10 GEL TOPICAL at 09:08

## 2025-08-21 RX ADMIN — Medication 1 CAPSULE: at 05:08

## 2025-08-21 RX ADMIN — OXYBUTYNIN CHLORIDE 10 MG: 5 TABLET, EXTENDED RELEASE ORAL at 09:08

## 2025-08-21 RX ADMIN — SITAGLIPTIN 50 MG: 50 TABLET, FILM COATED ORAL at 09:08

## 2025-08-21 RX ADMIN — Medication 1 CAPSULE: at 08:08

## 2025-08-21 RX ADMIN — MICONAZOLE NITRATE: 20 CREAM TOPICAL at 09:08

## 2025-08-21 RX ADMIN — OXYCODONE HYDROCHLORIDE AND ACETAMINOPHEN 500 MG: 500 TABLET ORAL at 09:08

## 2025-08-21 RX ADMIN — DILTIAZEM HYDROCHLORIDE 120 MG: 120 CAPSULE, COATED, EXTENDED RELEASE ORAL at 09:08

## 2025-08-21 RX ADMIN — ZINC SULFATE 220 MG (50 MG) CAPSULE 220 MG: CAPSULE at 09:08

## 2025-08-21 RX ADMIN — Medication 1 CAPSULE: at 11:08

## 2025-08-22 ENCOUNTER — PATIENT OUTREACH (OUTPATIENT)
Dept: ADMINISTRATIVE | Facility: CLINIC | Age: 60
End: 2025-08-22
Payer: MEDICARE

## 2025-09-03 ENCOUNTER — HOSPITAL ENCOUNTER (OUTPATIENT)
Dept: WOUND CARE | Facility: HOSPITAL | Age: 60
Discharge: HOME | End: 2025-09-03
Attending: PEDIATRICS
Payer: MEDICARE

## 2025-09-03 VITALS
RESPIRATION RATE: 18 BRPM | TEMPERATURE: 99 F | DIASTOLIC BLOOD PRESSURE: 74 MMHG | OXYGEN SATURATION: 97 % | HEART RATE: 65 BPM | SYSTOLIC BLOOD PRESSURE: 128 MMHG

## 2025-09-03 DIAGNOSIS — L89.154 PRESSURE ULCER OF SACRAL REGION, STAGE 4: Chronic | ICD-10-CM

## 2025-09-03 DIAGNOSIS — M86.9 OSTEOMYELITIS, UNSPECIFIED SITE, UNSPECIFIED TYPE: Chronic | ICD-10-CM

## 2025-09-03 DIAGNOSIS — L89.224 PRESSURE INJURY OF LEFT HIP, STAGE 4: ICD-10-CM

## 2025-09-03 DIAGNOSIS — G82.50 QUADRIPLEGIA: Primary | Chronic | ICD-10-CM

## 2025-09-03 DIAGNOSIS — L89.314 PRESSURE INJURY OF RIGHT ISCHIUM, STAGE 4: ICD-10-CM

## 2025-09-03 PROCEDURE — 99213 OFFICE O/P EST LOW 20 MIN: CPT | Mod: ,,, | Performed by: PEDIATRICS

## 2025-09-03 PROCEDURE — 27000999 HC MEDICAL RECORD PHOTO DOCUMENTATION

## 2025-09-03 PROCEDURE — 99213 OFFICE O/P EST LOW 20 MIN: CPT

## 2025-09-03 RX ORDER — KETOCONAZOLE 20 MG/ML
1 SHAMPOO, SUSPENSION TOPICAL DAILY
COMMUNITY
Start: 2025-07-11

## (undated) DEVICE — UNDERPAD ULTRASORB 300LB 30X36

## (undated) DEVICE — SUT 2-0 ETHILON 18 FS

## (undated) DEVICE — DRESSING GRANUFOAM LARGE VAC

## (undated) DEVICE — SUT 2/0 30IN SILK BLK BRAI

## (undated) DEVICE — TRAY SKIN SCRUB WET PREMIUM

## (undated) DEVICE — BLADE SURG STAINLESS STEEL #15

## (undated) DEVICE — SEE MEDLINE ITEM 157150

## (undated) DEVICE — PENCIL SMOKE EVAC TELSCP 15FT

## (undated) DEVICE — DRESSING MESALT IMPREG 8X8IN

## (undated) DEVICE — DRESSING TELFA STRL 4X3 LF

## (undated) DEVICE — GLOVE 6.5 PROTEXIS PI MICRO

## (undated) DEVICE — GLOVE 8.0 PROTEXIS PI MICRO

## (undated) DEVICE — ADHESIVE MASTISOL VIAL 48/BX

## (undated) DEVICE — GOWN SMARTSLEEVE AAMI LVL4 XL

## (undated) DEVICE — APPLICATOR CHLORAPREP ORN 26ML

## (undated) DEVICE — GLOVE PROTEXIS HYDROGEL SZ6.5

## (undated) DEVICE — CONTAINER SPECIMEN SCREW 4OZ

## (undated) DEVICE — POSITIONER HEAD ADULT

## (undated) DEVICE — CHLORAPREP W TINT 26ML APPL

## (undated) DEVICE — CANISTER INFOV.A.C WOUND 500ML

## (undated) DEVICE — GLOVE PROTEXIS HYDROGEL SZ7.5

## (undated) DEVICE — CONNECTOR Y VAC TRAC

## (undated) DEVICE — Device

## (undated) DEVICE — SPONGE GAUZE 16PLY 4X4

## (undated) DEVICE — KIT DRESS TRAC SM 10CM X 7.5CM

## (undated) DEVICE — BANDAGE ROLL COTTN 4.5INX4.1YD

## (undated) DEVICE — SOL NACL IRR 1000ML BTL

## (undated) DEVICE — PAD ABD 8X10 STERILE

## (undated) DEVICE — ELECTRODE PATIENT RETURN DISP

## (undated) DEVICE — HANDLE DEVON RIGID OR LIGHT

## (undated) DEVICE — SPONGE LAP STRL 18X18IN

## (undated) DEVICE — GAUZE SPONGE 4X4 12PLY

## (undated) DEVICE — GAUZE VISTEC XR DTECT 16 4X4IN

## (undated) DEVICE — GOWN X-LG STERILE BACK

## (undated) DEVICE — KIT SURGICAL TURNOVER

## (undated) DEVICE — MANIFOLD 4 PORT

## (undated) DEVICE — SPONGE COTTON TRAY 4X4IN

## (undated) DEVICE — TRAY CATH 1-LYR URIMTR 16FR

## (undated) DEVICE — SEE MEDLINE ITEM 157166

## (undated) DEVICE — CULTSWAB+ AMIES W/O CHARC SNG

## (undated) DEVICE — SCALPEL #10 BLADE STRL DISP

## (undated) DEVICE — SOL IRRI STRL WATER 1000ML

## (undated) DEVICE — GLOVE PROTEXIS BLUE LATEX 7

## (undated) DEVICE — DRESSING TELFA + BARR 4X6IN

## (undated) DEVICE — KIT DRSNG GRNUFM MED 18X12X5CM

## (undated) DEVICE — SUPPORT ULNA NERVE PROTECTOR

## (undated) DEVICE — GLOVE 7.5 PROTEXIS PI MICRO

## (undated) DEVICE — BANDAGE GAUZE COT STRL 4.5X4.1

## (undated) DEVICE — DRESSING HEMOSTATIC Q-CLOT 4X4

## (undated) DEVICE — PAD ABDOMINAL STERILE 8X10IN